# Patient Record
Sex: FEMALE | Race: WHITE | NOT HISPANIC OR LATINO | Employment: UNEMPLOYED | URBAN - METROPOLITAN AREA
[De-identification: names, ages, dates, MRNs, and addresses within clinical notes are randomized per-mention and may not be internally consistent; named-entity substitution may affect disease eponyms.]

---

## 2017-01-04 ENCOUNTER — OFFICE VISIT (OUTPATIENT)
Dept: PULMONOLOGY | Facility: CLINIC | Age: 47
End: 2017-01-04
Attending: INTERNAL MEDICINE
Payer: MEDICARE

## 2017-01-04 VITALS
DIASTOLIC BLOOD PRESSURE: 91 MMHG | OXYGEN SATURATION: 97 % | SYSTOLIC BLOOD PRESSURE: 140 MMHG | TEMPERATURE: 96.8 F | HEART RATE: 81 BPM | WEIGHT: 176.9 LBS | BODY MASS INDEX: 31.34 KG/M2

## 2017-01-04 DIAGNOSIS — R06.02 SHORTNESS OF BREATH: ICD-10-CM

## 2017-01-04 DIAGNOSIS — J44.9 SEVERE CHRONIC OBSTRUCTIVE PULMONARY DISEASE (H): Chronic | ICD-10-CM

## 2017-01-04 DIAGNOSIS — J45.909 UNCOMPLICATED ASTHMA, UNSPECIFIED ASTHMA SEVERITY: Primary | ICD-10-CM

## 2017-01-04 DIAGNOSIS — J43.8 OTHER EMPHYSEMA (H): ICD-10-CM

## 2017-01-04 PROCEDURE — 99212 OFFICE O/P EST SF 10 MIN: CPT | Mod: ZF

## 2017-01-04 RX ORDER — ALBUTEROL SULFATE 90 UG/1
2 AEROSOL, METERED RESPIRATORY (INHALATION) EVERY 6 HOURS
Qty: 3 INHALER | Refills: 3 | Status: ON HOLD | OUTPATIENT
Start: 2017-01-04 | End: 2018-02-09

## 2017-01-04 RX ORDER — BUDESONIDE AND FORMOTEROL FUMARATE DIHYDRATE 160; 4.5 UG/1; UG/1
2 AEROSOL RESPIRATORY (INHALATION) 2 TIMES DAILY
Qty: 3 INHALER | Refills: 3 | Status: SHIPPED | OUTPATIENT
Start: 2017-01-04 | End: 2017-11-21

## 2017-01-04 RX ORDER — NICOTINE POLACRILEX 2 MG/1
LOZENGE ORAL
Refills: 1 | COMMUNITY
Start: 2016-03-18 | End: 2018-04-25

## 2017-01-04 RX ORDER — LISINOPRIL 20 MG/1
TABLET ORAL
COMMUNITY
End: 2017-01-23

## 2017-01-04 RX ORDER — IPRATROPIUM BROMIDE AND ALBUTEROL SULFATE 2.5; .5 MG/3ML; MG/3ML
1 SOLUTION RESPIRATORY (INHALATION) EVERY 6 HOURS
Qty: 360 ML | Refills: 0 | Status: ON HOLD | OUTPATIENT
Start: 2017-01-04 | End: 2018-02-09

## 2017-01-04 RX ORDER — TIOTROPIUM BROMIDE 18 UG/1
CAPSULE ORAL; RESPIRATORY (INHALATION)
Qty: 30 CAPSULE | Refills: 1 | Status: ON HOLD | OUTPATIENT
Start: 2017-01-04 | End: 2018-02-09

## 2017-01-04 RX ORDER — FLUTICASONE PROPIONATE 50 MCG
2 SPRAY, SUSPENSION (ML) NASAL DAILY
Qty: 16 G | Refills: 11 | Status: SHIPPED | OUTPATIENT
Start: 2017-01-04 | End: 2017-03-27

## 2017-01-04 NOTE — Clinical Note
1/4/2017       RE: Swetha Patterson  [id number]  [facility] [place] [address]     Dear Colleague,    Thank you for referring your patient, Swetha Patterson, to the [facility] at [facility]. Please see a copy of my visit note below.    [facility]  Lung Nodule Clinic Note  January 4, 2017    Chief complaint:  Swetha Patterson is a 46 year old female seen in the Pulmonary Clinic  for   Chief Complaint   Patient presents with     Oncology Clinic Visit     lung nodules     Assessment and Plan:  1.  A 4 mm incidentally found new nodule in 06/2016 in the left lower lobe is not visible anymore on CT scan from 12/05/2016, which we discussed with the patient.  The patient has a history of smoking more than 30-pack years; therefore, could advise for annual CT scan of the chest for lung cancer screening purposes.  Initially continued by Dr. Williamson, who has been following the patient in the Pulmonary Clinic.   2.  Asthma.  The patient is status post bronchial thermoplasty with initial benefit, but response was not sustained.  She is back to her baseline, and requires prednisone treatment once or every other month.  She is back to smoking.  This probably contributes to her asthma symptoms significantly.  She has a history of GERD, and has been on a PPI.  Her last BT session was in 02/2015.   3.  Vocal cord nodules.  She has been seen by Dr. Mayen, and is scheduled to be seen by another ENT surgeon in 03/2017.   4.  Current smoker.      I spent more than 25 minutes face to face and greater than 50% of time was for counseling and coordination of care about pulm nodules.    History of Present Illness:  A 46-year-old woman with a history of asthma, status post bronchial thermoplasty.  She has a pulmonary nodule, and has been followed in our clinic.  She has sore throat, wheezing and shortness of breath.       Exposure history: Asbestos;  No , TB;  No , Radiation;   No     Allergies    Allergen Reactions     Codeine      vomiting     Hydrocodone Nausea and Vomiting     Sulfa Drugs      Nausea     Gabapentin Rash        Past Medical History   Diagnosis Date     Depressive disorder, not elsewhere classified      Anxiety state, unspecified      Esophageal reflux      Contact dermatitis and other eczema, due to unspecified cause      Asthma      Gastro-oesophageal reflux disease      Chronic pain      back pain from cyst     COPD (chronic obstructive pulmonary disease) (H)      Emphysema with chronic bronchitis (H)      Hyperlipidemia LDL goal <130      Family history of ischemic heart disease      HTN, goal below 140/90      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)      PONV (postoperative nausea and vomiting)      Arthritis      right hip arthritis        Past Surgical History   Procedure Laterality Date     C appendectomy  at age 17     Tonsillectomy  as a kid     Thoracoscopy  4/26/2013     Procedure: THORACOSCOPY;  LEFT VIDEO ASSISTED THORACOSCOPY, RESECTION OF POSTERIOR MEDIASTINAL MASS;  Surgeon: Av Peña MD;  Location: [facility]     Excise node mediastinal  4/26/2013     Procedure: EXCISE NODE MEDIASTINAL;;  Surgeon: Av Peña MD;  Location: [facility] OR     Bronchial thermoplasty N/A 11/14/2014     Procedure: BRONCHIAL THERMOPLASTY;  Surgeon: Ward Whitaker MD;  Location: [facility]     Bronchial thermoplasty N/A 12/19/2014     Procedure: BRONCHIAL THERMOPLASTY;  Surgeon: Ward Whitaker MD;  [facility]     Bronchial thermoplasty N/A 2/6/2015     Procedure: BRONCHIAL THERMOPLASTY;  Surgeon: Ward Whitaker MD;  [facility]: [facility]     Arthroscopy shoulder decompression Left 10/21/2015     Procedure: ARTHROSCOPY SHOULDER DECOMPRESSION;  Surgeon: Julien Milian MD;  Location:  OR        Social History     Social History     Marital Status: Single     Spouse Name: N/A     Number of Children: N/A     Years of Education: N/A     Occupational History      Not on file.     Social History Main Topics     Smoking status: Former Smoker -- 30 years     Quit date: 09/01/2015     Smokeless tobacco: Never Used      Comment: . started Chantix 8/9/15     Alcohol Use: No     Drug Use: No     Sexual Activity:     Partners: Male     Other Topics Concern     Caffeine Concern No     4-5 cans of pop daily     Special Diet No     Exercise No     on hold     Social History Narrative        Family History   Problem Relation Age of Onset     HEART DISEASE Mother      murmur, mi     Hypertension Mother      CEREBROVASCULAR DISEASE Mother      Depression Mother      Family History Negative Father      does not know  him     HEART DISEASE Sister      murmur, hole in heart     Family History Negative Brother      HEART DISEASE Maternal Grandfather      Gallbladder Disease Mother         Immunization History   Administered Date(s) Administered     Hepatitis B 07/09/1998, 09/18/2000     Influenza (IIV3) 01/09/2013, 10/27/2015     TDAP (ADACEL AGES 11-64) 06/12/2012       Current Outpatient Prescriptions   Medication Sig     lisinopril (PRINIVIL/ZESTRIL) 20 MG tablet      NICORETTE 2 MG lozenge PLACE 1 LOZENGE INSIDE CHEEK Q HOUR PRF SMOKING CESSATION UTD     oxyCODONE (ROXICODONE) 10 MG IR tablet 5 times daily as needed     diazepam (VALIUM) 10 MG tablet Take 1 tablet (10 mg) by mouth 2 times daily For sleep, anxiety, and muscle tension.     escitalopram (LEXAPRO) 10 MG tablet Take 1/2 tab daily for one week, then one tab daily. For anxiety and mood.     ketoconazole (NIZORAL) 2 % shampoo Apply to the affected area and wash off after 5 minutes.     desonide (DESOWEN) 0.05 % cream Apply sparingly to affected area three times daily as needed.     furosemide (LASIX) 20 MG tablet Take 1 tablet (20 mg) by mouth daily as needed     irbesartan (AVAPRO) 300 MG tablet Take 1 tablet (300 mg) by mouth daily     atorvastatin (LIPITOR) 80 MG tablet Take 1 tablet (80 mg) by mouth daily     predniSONE  (DELTASONE) 10 MG tablet Take is directed. Dr Low gave her a schedule     pantoprazole (PROTONIX) 20 MG tablet Take 2 tablets (40 mg) by mouth every morning (before breakfast)     prochlorperazine (COMPAZINE) 5 MG tablet Take 1 tablet (5 mg) by mouth every 6 hours as needed for nausea or vomiting     tiotropium (SPIRIVA HANDIHALER) 18 MCG inhalation capsule Inhale contents of one capsule daily.     fluticasone (FLONASE) 50 MCG/ACT nasal spray Spray 2 sprays into both nostrils daily     montelukast (SINGULAIR) 10 MG tablet Take 1 tablet (10 mg) by mouth At Bedtime     order for DME Equipment being ordered: Nebulizer machine  Length of need-lifetime     amLODIPine (NORVASC) 10 MG tablet Take 1 tablet (10 mg) by mouth daily     clindamycin-benzoyl peroxide (BENZACLIN) gel Apply topically 2 times daily     chlorhexidine (HIBICLENS) 4 % external liquid Wash ab, groin, thighs daily in shower DO NOT PUT ON FACE     budesonide-formoterol (SYMBICORT) 160-4.5 MCG/ACT inhaler Inhale 2 puffs into the lungs 2 times daily     albuterol (VENTOLIN HFA) 108 (90 BASE) MCG/ACT inhaler Inhale 2 puffs into the lungs every 6 hours     ranitidine (ZANTAC) 75 MG tablet Take 2 tablets (150 mg) by mouth 2 times daily     VITAMIN D, CHOLECALCIFEROL, PO Take 2,000 Units by mouth daily     cetirizine HCl 10 MG CAPS Take 1 capsule by mouth daily     ondansetron (ZOFRAN) 8 MG tablet Take 0.5-1 tablets (4-8 mg) by mouth every 8 hours as needed for nausea     ipratropium - albuterol 0.5 mg/2.5 mg/3 mL (DUONEB) 0.5-2.5 (3) MG/3ML nebulization Take 1 vial (3 mLs) by nebulization every 6 hours     No current facility-administered medications for this visit.        Review of Systems:  I have done 10 points of review systems and pertinent findings are wheezing ,otherwise negative.    Physical examination  Constitutional: Alert, oriented, not in distress  Vitals: B/P: 140/91, T: 96.8, P: 81, R: Data Unavailable  Eyes: No icterus, nystagmus, pupils  isocoric  Musculoskeletal: Normal muscle mass, no dephormity  Integumentary:  No rash, normal texture and turgor, no edema  Neurological: Alert, orientedx3, no motor deficits, cranial nerves grossly normal  Psychiatric:  Mood and affect are appropriate with insight into his/her medical condition    Data:  WBC     12.8   1/21/2016  RBC     5.24   1/21/2016  HGB     15.5   1/21/2016  HCT     46.8   1/21/2016  MCV       89   1/21/2016  MCH     29.6   1/21/2016  MCHC     33.1   1/21/2016  RDW     15.0   1/21/2016  PLT      364   1/21/2016    NA      144   1/21/2016   POTASSIUM      3.9   1/21/2016  CHLORIDE      107   1/21/2016  GENE      8.9   1/21/2016  CO2       29   1/21/2016  BUN       17   1/21/2016  CR     0.72   1/21/2016  GLC       97   1/21/2016    Thank you for allowing me participate in the care of Swetha Patterson.    RUBEN Whitaker MD

## 2017-01-04 NOTE — PROGRESS NOTES
[facility]  Lung Nodule Clinic Note  January 4, 2017    Chief complaint:  Swetha Patterson is a 46 year old female seen in the Pulmonary Clinic  for   Chief Complaint   Patient presents with     Oncology Clinic Visit     lung nodules     Assessment and Plan:  1.  A 4 mm incidentally found new nodule in 06/2016 in the left lower lobe is not visible anymore on CT scan from 12/05/2016, which we discussed with the patient.  The patient has a history of smoking more than 30-pack years; therefore, could advise for annual CT scan of the chest for lung cancer screening purposes.  Initially continued by Dr. Williamson, who has been following the patient in the Pulmonary Clinic.   2.  Asthma.  The patient is status post bronchial thermoplasty with initial benefit, but response was not sustained.  She is back to her baseline, and requires prednisone treatment once or every other month.  She is back to smoking.  This probably contributes to her asthma symptoms significantly.  She has a history of GERD, and has been on a PPI.  Her last BT session was in 02/2015.   3.  Vocal cord nodules.  She has been seen by Dr. Mayen, and is scheduled to be seen by another ENT surgeon in 03/2017.   4.  Current smoker.      I spent more than 25 minutes face to face and greater than 50% of time was for counseling and coordination of care about pulm nodules.    History of Present Illness:  A 46-year-old woman with a history of asthma, status post bronchial thermoplasty.  She has a pulmonary nodule, and has been followed in our clinic.  She has sore throat, wheezing and shortness of breath.       Exposure history: Asbestos;  No , TB;  No , Radiation;   No     Allergies   Allergen Reactions     Codeine      vomiting     Hydrocodone Nausea and Vomiting     Sulfa Drugs      Nausea     Gabapentin Rash        Past Medical History   Diagnosis Date     Depressive disorder, not elsewhere classified      Anxiety state, unspecified      Esophageal reflux       Contact dermatitis and other eczema, due to unspecified cause      Asthma      Gastro-oesophageal reflux disease      Chronic pain      back pain from cyst     COPD (chronic obstructive pulmonary disease) (H)      Emphysema with chronic bronchitis (H)      Hyperlipidemia LDL goal <130      Family history of ischemic heart disease      HTN, goal below 140/90      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)      PONV (postoperative nausea and vomiting)      Arthritis      right hip arthritis        Past Surgical History   Procedure Laterality Date     C appendectomy  at age 17     Tonsillectomy  as a kid     Thoracoscopy  4/26/2013     Procedure: THORACOSCOPY;  LEFT VIDEO ASSISTED THORACOSCOPY, RESECTION OF POSTERIOR MEDIASTINAL MASS;  Surgeon: Av Peña MD;  Location: [facility]     Excise node mediastinal  4/26/2013     Procedure: EXCISE NODE MEDIASTINAL;;  Surgeon: Av Peña MD;  Location: [facility]     Bronchial thermoplasty N/A 11/14/2014     Procedure: BRONCHIAL THERMOPLASTY;  Surgeon: Ward Whitaker MD;  [facility]     Bronchial thermoplasty N/A 12/19/2014     Procedure: BRONCHIAL THERMOPLASTY;  Surgeon: Ward Whitaker MD;  [facility]     Bronchial thermoplasty N/A 2/6/2015     Procedure: BRONCHIAL THERMOPLASTY;  Surgeon: Ward Whitaker MD;  [facility]: [facility]     Arthroscopy shoulder decompression Left 10/21/2015     Procedure: ARTHROSCOPY SHOULDER DECOMPRESSION;  Surgeon: Julien Milian MD;  Location: [facility]        Social History     Social History     Marital Status: Single     Spouse Name: N/A     Number of Children: N/A     Years of Education: N/A     Occupational History     Not on file.     Social History Main Topics     Smoking status: Former Smoker -- 30 years     Quit date: 09/01/2015     Smokeless tobacco: Never Used      Comment: . started Chantix 8/9/15     Alcohol Use: No     Drug Use: No     Sexual Activity:     Partners: Male     Other Topics  Concern     Caffeine Concern No     4-5 cans of pop daily     Special Diet No     Exercise No     on hold     Social History Narrative        Family History   Problem Relation Age of Onset     HEART DISEASE Mother      murmur, mi     Hypertension Mother      CEREBROVASCULAR DISEASE Mother      Depression Mother      Family History Negative Father      does not know  him     HEART DISEASE Sister      murmur, hole in heart     Family History Negative Brother      HEART DISEASE Maternal Grandfather      Gallbladder Disease Mother         Immunization History   Administered Date(s) Administered     Hepatitis B 07/09/1998, 09/18/2000     Influenza (IIV3) 01/09/2013, 10/27/2015     TDAP (ADACEL AGES 11-64) 06/12/2012       Current Outpatient Prescriptions   Medication Sig     lisinopril (PRINIVIL/ZESTRIL) 20 MG tablet      NICORETTE 2 MG lozenge PLACE 1 LOZENGE INSIDE CHEEK Q HOUR PRF SMOKING CESSATION UTD     oxyCODONE (ROXICODONE) 10 MG IR tablet 5 times daily as needed     diazepam (VALIUM) 10 MG tablet Take 1 tablet (10 mg) by mouth 2 times daily For sleep, anxiety, and muscle tension.     escitalopram (LEXAPRO) 10 MG tablet Take 1/2 tab daily for one week, then one tab daily. For anxiety and mood.     ketoconazole (NIZORAL) 2 % shampoo Apply to the affected area and wash off after 5 minutes.     desonide (DESOWEN) 0.05 % cream Apply sparingly to affected area three times daily as needed.     furosemide (LASIX) 20 MG tablet Take 1 tablet (20 mg) by mouth daily as needed     irbesartan (AVAPRO) 300 MG tablet Take 1 tablet (300 mg) by mouth daily     atorvastatin (LIPITOR) 80 MG tablet Take 1 tablet (80 mg) by mouth daily     predniSONE (DELTASONE) 10 MG tablet Take is directed. Dr Low gave her a schedule     pantoprazole (PROTONIX) 20 MG tablet Take 2 tablets (40 mg) by mouth every morning (before breakfast)     prochlorperazine (COMPAZINE) 5 MG tablet Take 1 tablet (5 mg) by mouth every 6 hours as needed for  nausea or vomiting     tiotropium (SPIRIVA HANDIHALER) 18 MCG inhalation capsule Inhale contents of one capsule daily.     fluticasone (FLONASE) 50 MCG/ACT nasal spray Spray 2 sprays into both nostrils daily     montelukast (SINGULAIR) 10 MG tablet Take 1 tablet (10 mg) by mouth At Bedtime     order for DME Equipment being ordered: Nebulizer machine  Length of need-lifetime     amLODIPine (NORVASC) 10 MG tablet Take 1 tablet (10 mg) by mouth daily     clindamycin-benzoyl peroxide (BENZACLIN) gel Apply topically 2 times daily     chlorhexidine (HIBICLENS) 4 % external liquid Wash ab, groin, thighs daily in shower DO NOT PUT ON FACE     budesonide-formoterol (SYMBICORT) 160-4.5 MCG/ACT inhaler Inhale 2 puffs into the lungs 2 times daily     albuterol (VENTOLIN HFA) 108 (90 BASE) MCG/ACT inhaler Inhale 2 puffs into the lungs every 6 hours     ranitidine (ZANTAC) 75 MG tablet Take 2 tablets (150 mg) by mouth 2 times daily     VITAMIN D, CHOLECALCIFEROL, PO Take 2,000 Units by mouth daily     cetirizine HCl 10 MG CAPS Take 1 capsule by mouth daily     ondansetron (ZOFRAN) 8 MG tablet Take 0.5-1 tablets (4-8 mg) by mouth every 8 hours as needed for nausea     ipratropium - albuterol 0.5 mg/2.5 mg/3 mL (DUONEB) 0.5-2.5 (3) MG/3ML nebulization Take 1 vial (3 mLs) by nebulization every 6 hours     No current facility-administered medications for this visit.        Review of Systems:  I have done 10 points of review systems and pertinent findings are wheezing ,otherwise negative.    Physical examination  Constitutional: Alert, oriented, not in distress  Vitals: B/P: 140/91, T: 96.8, P: 81, R: Data Unavailable  Eyes: No icterus, nystagmus, pupils isocoric  Musculoskeletal: Normal muscle mass, no dephormity  Integumentary:  No rash, normal texture and turgor, no edema  Neurological: Alert, orientedx3, no motor deficits, cranial nerves grossly normal  Psychiatric:  Mood and affect are appropriate with insight into his/her  medical condition    Data:  WBC     12.8   1/21/2016  RBC     5.24   1/21/2016  HGB     15.5   1/21/2016  HCT     46.8   1/21/2016  MCV       89   1/21/2016  MCH     29.6   1/21/2016  MCHC     33.1   1/21/2016  RDW     15.0   1/21/2016  PLT      364   1/21/2016    NA      144   1/21/2016   POTASSIUM      3.9   1/21/2016  CHLORIDE      107   1/21/2016  GENE      8.9   1/21/2016  CO2       29   1/21/2016  BUN       17   1/21/2016  CR     0.72   1/21/2016  GLC       97   1/21/2016    Thank you for allowing me participate in the care of Swetha MALVIN Patterson.    RUBEN Whitaker MD

## 2017-01-04 NOTE — NURSING NOTE
"Swetha Patterson is a 46 year old female who presents for:  Chief Complaint   Patient presents with     Oncology Clinic Visit     lung nodules        Initial Vitals:  /91 mmHg  Pulse 81  Temp(Src) 96.8  F (36  C) (Tympanic)  Wt 80.241 kg (176 lb 14.4 oz)  SpO2 97%  Breastfeeding? No Estimated body mass index is 31.34 kg/(m^2) as calculated from the following:    Height as of 12/19/16: 1.6 m (5' 3\").    Weight as of this encounter: 80.241 kg (176 lb 14.4 oz).. There is no height on file to calculate BSA. BP completed using cuff size: large  Data Unavailable No LMP recorded. Patient is not currently having periods (Reason: Premenopausal). Allergies and medications reviewed.     Medications: MEDICATION REFILLS NEEDED TODAY.  Pharmacy name entered into [facility]:    [facility] [address] [place] [address] [address] [place] [address] AT [facility] & [place], [place] - [address] [facility] [place], [place] - [address]  [address] [facility]    Comments: refills of symbacort, flonase, duoneb,ventolin, spiriva. Patient would also like to discuss possibly starting a different inhaler    7 minutes for nursing intake (face to face time)   Aretha Yang CMA          "

## 2017-01-23 ENCOUNTER — OFFICE VISIT (OUTPATIENT)
Dept: INTERNAL MEDICINE | Facility: CLINIC | Age: 47
End: 2017-01-23
Payer: MEDICARE

## 2017-01-23 VITALS
BODY MASS INDEX: 31.24 KG/M2 | WEIGHT: 176.3 LBS | SYSTOLIC BLOOD PRESSURE: 130 MMHG | OXYGEN SATURATION: 99 % | HEART RATE: 89 BPM | TEMPERATURE: 98.1 F | HEIGHT: 63 IN | DIASTOLIC BLOOD PRESSURE: 80 MMHG

## 2017-01-23 DIAGNOSIS — L73.2 HYDRADENITIS: ICD-10-CM

## 2017-01-23 DIAGNOSIS — F41.9 ANXIETY: ICD-10-CM

## 2017-01-23 DIAGNOSIS — E78.5 HYPERLIPIDEMIA LDL GOAL <130: ICD-10-CM

## 2017-01-23 DIAGNOSIS — M79.10 MUSCLE PAIN: ICD-10-CM

## 2017-01-23 DIAGNOSIS — R60.0 BILATERAL LEG EDEMA: ICD-10-CM

## 2017-01-23 DIAGNOSIS — B37.31 CANDIDIASIS OF VULVA AND VAGINA: ICD-10-CM

## 2017-01-23 DIAGNOSIS — R07.89 CHEST WALL PAIN: ICD-10-CM

## 2017-01-23 DIAGNOSIS — F11.20 CONTINUOUS OPIOID DEPENDENCE (H): Primary | Chronic | ICD-10-CM

## 2017-01-23 DIAGNOSIS — Z82.49 FAMILY HISTORY OF ISCHEMIC HEART DISEASE: ICD-10-CM

## 2017-01-23 DIAGNOSIS — J44.9 SEVERE CHRONIC OBSTRUCTIVE PULMONARY DISEASE (H): Chronic | ICD-10-CM

## 2017-01-23 DIAGNOSIS — I10 ESSENTIAL HYPERTENSION WITH GOAL BLOOD PRESSURE LESS THAN 140/90: Chronic | ICD-10-CM

## 2017-01-23 PROCEDURE — 99214 OFFICE O/P EST MOD 30 MIN: CPT | Performed by: INTERNAL MEDICINE

## 2017-01-23 RX ORDER — FUROSEMIDE 20 MG
20 TABLET ORAL DAILY PRN
Qty: 90 TABLET | Refills: 1 | Status: SHIPPED | OUTPATIENT
Start: 2017-01-23 | End: 2017-11-21

## 2017-01-23 RX ORDER — FLUCONAZOLE 150 MG/1
150 TABLET ORAL
Qty: 4 TABLET | Refills: 0 | Status: SHIPPED | OUTPATIENT
Start: 2017-01-23 | End: 2017-03-02

## 2017-01-23 RX ORDER — DIAZEPAM 10 MG
10 TABLET ORAL EVERY 8 HOURS PRN
Qty: 126 TABLET | Refills: 0 | Status: SHIPPED | OUTPATIENT
Start: 2017-01-23 | End: 2017-03-02

## 2017-01-23 RX ORDER — ATORVASTATIN CALCIUM 80 MG/1
80 TABLET, FILM COATED ORAL DAILY
Qty: 90 TABLET | Refills: 1 | Status: SHIPPED | OUTPATIENT
Start: 2017-01-23 | End: 2017-03-27

## 2017-01-23 RX ORDER — OXYCODONE HYDROCHLORIDE 10 MG/1
TABLET ORAL
Qty: 210 TABLET | Refills: 0 | Status: SHIPPED | OUTPATIENT
Start: 2017-01-23 | End: 2017-03-02

## 2017-01-23 RX ORDER — LISINOPRIL 20 MG/1
20 TABLET ORAL DAILY
Qty: 90 TABLET | Refills: 1 | Status: SHIPPED | OUTPATIENT
Start: 2017-01-23 | End: 2017-03-27

## 2017-01-23 NOTE — PROGRESS NOTES
Dr Low's note            ASSESSMENT & PLAN:                                                      (F11.20) Continuous opioid dependence ([facility]) opioid for chr cough and chest pain ^^^^  (primary encounter diagnosis)  Comment:   Plan: oxyCODONE (ROXICODONE) 10 MG IR tablet            (J44.9) Chr Lung Disease - managed by the pulm dept ([facility])^^^  Comment:   Plan: oxyCODONE (ROXICODONE) 10 MG IR tablet            (L73.2) Hydradenitis  Comment:   Plan: no time to look for a surgeon for I&D sunce she lives tomorrow for [place]. I advised her to see UC or ER for possible I&D    (B37.3) Candidiasis of vulva and vagina  Comment:   Plan: fluconazole (DIFLUCAN) 150 MG tablet            (Z82.49) Family history of ischemic heart disease  Comment:   Plan: atorvastatin (LIPITOR) 80 MG tablet            (E78.5) Hyperlipidemia LDL goal <130  Comment:   Plan: atorvastatin (LIPITOR) 80 MG tablet            (R60.0) Bilateral leg edema  Comment:   Plan: furosemide (LASIX) 20 MG tablet            (I10) Essential hypertension with goal blood pressure less than 140/90  Comment:   Plan: lisinopril (PRINIVIL/ZESTRIL) 20 MG tablet            (R07.89) Chest wall pain  Comment:   Plan: oxyCODONE (ROXICODONE) 10 MG IR tablet,         diazepam (VALIUM) 10 MG tablet            (M79.1) Muscle pain  Comment:   Plan: diazepam (VALIUM) 10 MG tablet            (F41.9) Anxiety  Comment:   Plan: diazepam (VALIUM) 10 MG tablet               Chief complaint:                                                      Follow up chronic medical problems    abd skin     SUBJECTIVE:                                                    Swetha Patterson is a 46 year old female who presents to clinic today for the following health issues:        *RECHECK-Follow up LOV 12/19/16. She has been spending a lot of time in [place], will be leaving again tomorrow morning to go back there.   *Derm Problem-Spots on her lower abdominal area are flaring up. She is on doxycyline for this,  "but this is giving her yeast infection. Would like Rx for Fluconazole.   *Imm/Inj-Ok to do flu shot today?         Review of Systems:                                                      ROS: negative for fever, chills, wheezes, chest pain, shortness of breath, vomiting, abdominal pain, leg swelling Pos for chr cough     A 10-point review of systems was obtained.  Those pertinent are above and in the in the Subjective section.  The rest of the systems are negative.           OBJECTIVE:             Physical exam:  Blood pressure 130/80, pulse 89, temperature 98.1  F (36.7  C), temperature source Oral, height 5' 3\" (1.6 m), weight 176 lb 4.8 oz (79.969 kg), SpO2 99 %, not currently breastfeeding.   NAD, appears comfortable  Skin: she has chronic hydradenitis on the abd wall. Today I think she has a cyst that got inflamed. Swollen about 4x 5 cm with redness around.   Chest: clear to auscultation bilaterally, good respiratory effort  Heart: S1 S2, RRR, no mgr appreciated  Abdomen: soft, not tender,   Extremities: no edema,   Neurologic: A, Ox3, no focal signs appreciated    PMHx: reviewed  Past Medical History   Diagnosis Date     Depressive disorder, not elsewhere classified      Anxiety state, unspecified      Esophageal reflux      Contact dermatitis and other eczema, due to unspecified cause      Asthma      Gastro-oesophageal reflux disease      Chronic pain      back pain from cyst     COPD (chronic obstructive pulmonary disease) (H)      Emphysema with chronic bronchitis (H)      Hyperlipidemia LDL goal <130      Family history of ischemic heart disease      HTN, goal below 140/90      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)      PONV (postoperative nausea and vomiting)      Arthritis      right hip arthritis      PSHx: reviewed  Past Surgical History   Procedure Laterality Date     C appendectomy  at age 17     Tonsillectomy  as a kid     Thoracoscopy  4/26/2013     Procedure: THORACOSCOPY;  LEFT VIDEO " ASSISTED THORACOSCOPY, RESECTION OF POSTERIOR MEDIASTINAL MASS;  Surgeon: Av Peña MD;  Location: [facility] OR     Excise node mediastinal  4/26/2013     Procedure: EXCISE NODE MEDIASTINAL;;  Surgeon: Av Peña MD;  Location: [facility] OR     Bronchial thermoplasty N/A 11/14/2014     Procedure: BRONCHIAL THERMOPLASTY;  Surgeon: Ward Whitaker MD;  Location: [facility]     Bronchial thermoplasty N/A 12/19/2014     Procedure: BRONCHIAL THERMOPLASTY;  Surgeon: Ward Whitaker MD;  Location: [facility]     Bronchial thermoplasty N/A 2/6/2015     Procedure: BRONCHIAL THERMOPLASTY;  Surgeon: Ward Whitaker MD;  Location: [facility]     Arthroscopy shoulder decompression Left 10/21/2015     Procedure: ARTHROSCOPY SHOULDER DECOMPRESSION;  Surgeon: Julien Milian MD;  Location: [facility] OR        Meds: reviewed  Current Outpatient Prescriptions   Medication Sig Dispense Refill     fluconazole (DIFLUCAN) 150 MG tablet Take 1 tablet (150 mg) by mouth every 3 days 4 tablet 0     atorvastatin (LIPITOR) 80 MG tablet Take 1 tablet (80 mg) by mouth daily 90 tablet 1     furosemide (LASIX) 20 MG tablet Take 1 tablet (20 mg) by mouth daily as needed 90 tablet 1     lisinopril (PRINIVIL/ZESTRIL) 20 MG tablet Take 1 tablet (20 mg) by mouth daily 90 tablet 1     oxyCODONE (ROXICODONE) 10 MG IR tablet 5 times daily as needed 210 tablet 0     diazepam (VALIUM) 10 MG tablet Take 1 tablet (10 mg) by mouth every 8 hours as needed for anxiety or sleep For sleep, anxiety, and muscle tension. 126 tablet 0     NICORETTE 2 MG lozenge PLACE 1 LOZENGE INSIDE CHEEK Q HOUR PRF SMOKING CESSATION UTD  1     budesonide-formoterol (SYMBICORT) 160-4.5 MCG/ACT Inhaler Inhale 2 puffs into the lungs 2 times daily 3 Inhaler 3     fluticasone (FLONASE) 50 MCG/ACT spray Spray 2 sprays into both nostrils daily 16 g 11     ipratropium - albuterol 0.5 mg/2.5 mg/3 mL (DUONEB) 0.5-2.5 (3) MG/3ML neb solution Take 1 vial (3 mLs) by nebulization  every 6 hours 360 mL 0     tiotropium (SPIRIVA HANDIHALER) 18 MCG capsule Inhale contents of one capsule daily. 30 capsule 1     albuterol (VENTOLIN HFA) 108 (90 BASE) MCG/ACT Inhaler Inhale 2 puffs into the lungs every 6 hours 3 Inhaler 3     escitalopram (LEXAPRO) 10 MG tablet Take 1/2 tab daily for one week, then one tab daily. For anxiety and mood. 30 tablet 1     ketoconazole (NIZORAL) 2 % shampoo Apply to the affected area and wash off after 5 minutes. 120 mL 1     desonide (DESOWEN) 0.05 % cream Apply sparingly to affected area three times daily as needed. 60 g 0     irbesartan (AVAPRO) 300 MG tablet Take 1 tablet (300 mg) by mouth daily 90 tablet 1     predniSONE (DELTASONE) 10 MG tablet Take is directed. Dr Low gave her a schedule 60 tablet 0     pantoprazole (PROTONIX) 20 MG tablet Take 2 tablets (40 mg) by mouth every morning (before breakfast) 90 tablet 3     prochlorperazine (COMPAZINE) 5 MG tablet Take 1 tablet (5 mg) by mouth every 6 hours as needed for nausea or vomiting 90 tablet 0     montelukast (SINGULAIR) 10 MG tablet Take 1 tablet (10 mg) by mouth At Bedtime 30 tablet 11     order for DME Equipment being ordered: Nebulizer machine  Length of need-lifetime 1 Device 0     amLODIPine (NORVASC) 10 MG tablet Take 1 tablet (10 mg) by mouth daily 90 tablet 1     clindamycin-benzoyl peroxide (BENZACLIN) gel Apply topically 2 times daily 100 g 3     chlorhexidine (HIBICLENS) 4 % external liquid Wash ab, groin, thighs daily in shower DO NOT PUT ON FACE 946 mL 3     ranitidine (ZANTAC) 75 MG tablet Take 2 tablets (150 mg) by mouth 2 times daily 60 tablet 3     VITAMIN D, CHOLECALCIFEROL, PO Take 2,000 Units by mouth daily       cetirizine HCl 10 MG CAPS Take 1 capsule by mouth daily 90 capsule 3     ondansetron (ZOFRAN) 8 MG tablet Take 0.5-1 tablets (4-8 mg) by mouth every 8 hours as needed for nausea 30 tablet 1       Soc Hx: reviewed  Fam Hx: reviewed          Roro Low MD  Internal  Medicine

## 2017-01-23 NOTE — NURSING NOTE
"Chief Complaint   Patient presents with     RECHECK     Derm Problem     Imm/Inj       Initial /80 mmHg  Pulse 89  Temp(Src) 98.1  F (36.7  C) (Oral)  Ht 5' 3\" (1.6 m)  Wt 176 lb 4.8 oz (79.969 kg)  BMI 31.24 kg/m2  SpO2 99%  Breastfeeding? No Estimated body mass index is 31.24 kg/(m^2) as calculated from the following:    Height as of this encounter: 5' 3\" (1.6 m).    Weight as of this encounter: 176 lb 4.8 oz (79.969 kg).  BP completed using cuff size: hilda Villarreal CMA      "

## 2017-01-24 ASSESSMENT — ASTHMA QUESTIONNAIRES: ACT_TOTALSCORE: 10

## 2017-01-26 ENCOUNTER — TELEPHONE (OUTPATIENT)
Dept: INTERNAL MEDICINE | Facility: CLINIC | Age: 47
End: 2017-01-26

## 2017-01-26 NOTE — TELEPHONE ENCOUNTER
Called pt regarding form she left here for special diet with [facility]. Dr. Low signed it, but there is a spot on the form that Swetha needed to sign okaying us to release this information to [facility]. She is in [place] right now until the beginning of March, advised her we will hang on to it until she comes back and can sign it. She was ok with this plan. I will keep this form at my desk for now.    She was also asking about a derm issue she has been dealing with on her abdominal area. She has a few red painful swollen lumps that have increased in size and diameter (she says they're sticking out of her skin by about 3 inches now and the area of redness have grown). She also reports that she is generally not feeling well and has been getting hot and cold flashes (she wasn't sure if she has a fever). Per Dr. Low, she needs to go to an urgent care or an ED there in [place], may need to have those areas lanced. Pt understood with plan and agrees to go to ED today.

## 2017-02-14 DIAGNOSIS — I10 ESSENTIAL HYPERTENSION WITH GOAL BLOOD PRESSURE LESS THAN 140/90: Chronic | ICD-10-CM

## 2017-02-14 DIAGNOSIS — J30.89 DUST ALLERGY: ICD-10-CM

## 2017-02-14 DIAGNOSIS — J44.9 SEVERE CHRONIC OBSTRUCTIVE PULMONARY DISEASE (H): Chronic | ICD-10-CM

## 2017-02-14 NOTE — TELEPHONE ENCOUNTER
Called pt backand informed of message from Pharmacist. To contact her insurance company restricted program and inform of PCP KEYLA. Instructed her to list Dr. Leeann Chandra as the covering provider d/t leave of absence. Pt agrees.   Pt states she also needs a refill of her Irbesartan 300 mg tablets and Singulair 10 mg tablets.     Pt will call back with information from her insurance company.

## 2017-02-14 NOTE — TELEPHONE ENCOUNTER
Patient calling in stating she will be in March 2nd and flying out to [place] March 6th and wont be back until May 15th, 2017. Concerned about getting her prescriptions refilled d/t restrictions. States she will be needing a refill of Oxycodone and Valium, and a  heart medication that she could not remember. The phone call was disconnected at this time.    Called [facility] to discuss PCP Leave of Absence. Jacquelin, pharmacist, states that pt will need to contact her insurance restricted program worker. Pharmacist states her script currently should last her through February 6th 2017.

## 2017-02-14 NOTE — TELEPHONE ENCOUNTER
Insurance contacted by Pt and they and told the pt that she is no longer on the restrictive program and she can see any one.    Her and NNEKA De Guzman discussed seeing Dr. Chandra. So refused to work with me and asked for Gege to call her back.    Sorry, passed to NNEKA De Guzman RN

## 2017-02-15 RX ORDER — MONTELUKAST SODIUM 10 MG/1
10 TABLET ORAL AT BEDTIME
Qty: 30 TABLET | Refills: 1 | Status: ON HOLD | OUTPATIENT
Start: 2017-02-15 | End: 2018-02-09

## 2017-02-15 RX ORDER — IRBESARTAN 300 MG/1
300 TABLET ORAL DAILY
Qty: 90 TABLET | Refills: 1 | Status: SHIPPED | OUTPATIENT
Start: 2017-02-15 | End: 2017-10-16

## 2017-02-15 NOTE — TELEPHONE ENCOUNTER
Pt is no longer on the restricted program according to her insurance company. Scheduled appt to establish care with Dr. Chandra 3/2/17 at 0900.      Irbesartan      Last Written Prescription Date: 11/1/16  Last Fill Quantity: 90, # refills: 1  Last Office Visit with [facility], [facility] or [facility] prescribing provider: 1/23/17 Crintea  Next 5 appointments (look out 90 days)     Mar 02, 2017  9:00 AM CST   [facility]    [address]  [facility] [address]   [phone]                   Potassium   Date Value Ref Range Status   01/21/2016 3.9 3.4 - 5.3 mmol/L Final     Creatinine   Date Value Ref Range Status   01/21/2016 0.72 0.52 - 1.04 mg/dL Final     BP Readings from Last 3 Encounters:   01/23/17 130/80   01/04/17 (!) 140/91   12/19/16 114/74       Prescription approved per [facility] Refill Protocol.      Montelukast       Last Written Prescription Date: 4/8/16  Last Fill Quantity: 30, # refills: 11    Last Office Visit with [facility], [facility] or [facility] prescribing provider:  1/23/17 Crintea   Future Office Visit:    Next 5 appointments (look out 90 days)     Mar 02, 2017  9:00 AM CST   [facility]    [address]  [facility] [address]   [phone]                   Date of Last Asthma Action Plan Letter:   Asthma Action Plan Q1 Year    Topic Date Due     Asthma Action Plan - yearly  10/04/2017      Asthma Control Test:   ACT Total Scores 1/23/2017   ACT TOTAL SCORE (Goal Greater than or Equal to 20) 10   In the past 12 months, how many times did you visit the emergency room for your asthma without being admitted to the hospital? 0   In the past 12 months, how many times were you hospitalized overnight because of your asthma? 0       Date of Last Spirometry Test:   No results found for this or any previous visit.    Routing refill request to provider  for review/approval because:  Patient needs to be seen because:  No spirometry test completed.

## 2017-03-02 ENCOUNTER — OFFICE VISIT (OUTPATIENT)
Dept: INTERNAL MEDICINE | Facility: CLINIC | Age: 47
End: 2017-03-02
Payer: MEDICARE

## 2017-03-02 VITALS
HEART RATE: 82 BPM | DIASTOLIC BLOOD PRESSURE: 74 MMHG | SYSTOLIC BLOOD PRESSURE: 110 MMHG | WEIGHT: 173.9 LBS | OXYGEN SATURATION: 97 % | HEIGHT: 63 IN | BODY MASS INDEX: 30.81 KG/M2 | TEMPERATURE: 98.1 F

## 2017-03-02 DIAGNOSIS — R07.89 CHEST WALL PAIN: ICD-10-CM

## 2017-03-02 DIAGNOSIS — M79.10 MUSCLE PAIN: ICD-10-CM

## 2017-03-02 DIAGNOSIS — T38.0X5A IMMUNODEFICIENCY SECONDARY TO STEROIDS (H): Chronic | ICD-10-CM

## 2017-03-02 DIAGNOSIS — I10 ESSENTIAL HYPERTENSION WITH GOAL BLOOD PRESSURE LESS THAN 140/90: Chronic | ICD-10-CM

## 2017-03-02 DIAGNOSIS — F11.20 CONTINUOUS OPIOID DEPENDENCE (H): Chronic | ICD-10-CM

## 2017-03-02 DIAGNOSIS — F41.9 ANXIETY: ICD-10-CM

## 2017-03-02 DIAGNOSIS — K75.81 NASH (NONALCOHOLIC STEATOHEPATITIS): Chronic | ICD-10-CM

## 2017-03-02 DIAGNOSIS — G89.29 CHRONIC PAIN WITH DRUG DEPENDENCE (H): Chronic | ICD-10-CM

## 2017-03-02 DIAGNOSIS — B37.31 CANDIDIASIS OF VULVA AND VAGINA: ICD-10-CM

## 2017-03-02 DIAGNOSIS — D84.821 IMMUNODEFICIENCY SECONDARY TO STEROIDS (H): Chronic | ICD-10-CM

## 2017-03-02 DIAGNOSIS — F19.20 CHRONIC PAIN WITH DRUG DEPENDENCE (H): Chronic | ICD-10-CM

## 2017-03-02 DIAGNOSIS — Z79.52 IMMUNODEFICIENCY SECONDARY TO STEROIDS (H): Chronic | ICD-10-CM

## 2017-03-02 DIAGNOSIS — J44.9 SEVERE CHRONIC OBSTRUCTIVE PULMONARY DISEASE (H): Primary | Chronic | ICD-10-CM

## 2017-03-02 PROCEDURE — 99214 OFFICE O/P EST MOD 30 MIN: CPT | Performed by: INTERNAL MEDICINE

## 2017-03-02 RX ORDER — DIAZEPAM 10 MG
10 TABLET ORAL EVERY 8 HOURS PRN
Qty: 126 TABLET | Refills: 0 | Status: SHIPPED | OUTPATIENT
Start: 2017-03-02 | End: 2017-03-27

## 2017-03-02 RX ORDER — OXYCODONE HYDROCHLORIDE 10 MG/1
TABLET ORAL
Qty: 210 TABLET | Refills: 0 | Status: SHIPPED | OUTPATIENT
Start: 2017-03-02 | End: 2017-03-27

## 2017-03-02 RX ORDER — FLUCONAZOLE 150 MG/1
150 TABLET ORAL
Qty: 4 TABLET | Refills: 0 | Status: SHIPPED | OUTPATIENT
Start: 2017-03-02 | End: 2017-03-27

## 2017-03-02 NOTE — NURSING NOTE
"Chief Complaint   Patient presents with     RECHECK     Not establishing care, just seeing Dr. Chandra while Dr. Low is out. PT came back from [place] today, will be leaving again this Tuesday 3/7/17 and will be in [place] until 4/23/17. Will be meeting with throat surgeon tomorrow morning.      Recheck Medication     Review and refill     Medication Request     She has doxycycline on hand to use for her ongoing skin infection, but she needs Rx for Diflucan because she gets yeast infection every time.        Initial /74 (BP Location: Right arm, Patient Position: Chair, Cuff Size: Adult Large)  Pulse 82  Temp 98.1  F (36.7  C) (Oral)  Ht 5' 3\" (1.6 m)  Wt 173 lb 14.4 oz (78.9 kg)  SpO2 97%  Breastfeeding? No  BMI 30.81 kg/m2 Estimated body mass index is 30.81 kg/(m^2) as calculated from the following:    Height as of this encounter: 5' 3\" (1.6 m).    Weight as of this encounter: 173 lb 14.4 oz (78.9 kg).  Medication Reconciliation: complete   Carolyn Villarreal CMA      "

## 2017-03-02 NOTE — MR AVS SNAPSHOT
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)        Chest wall pain        Continuous opioid dependence ([facility]) opioid for chr cough and chest pain ^^^^        Muscle pain        Anxiety        Candidiasis of vulva and vagina           Follow-ups after your visit        Your next 10 appointments already scheduled     Mar 03, 2017  8:40 AM CST   (Arrive by 8:10 AM)   NEW THROAT with Lis Talbert MD   [facility] ([facility])    [address] [place] [address] [phone]           - This is a multidisciplinary care team visit with an ENT physician and may include a speech language pathologist. - It is important to know that if you are evaluated and/or treated by both a physician and a speech pathologist during your visit, your billing will reflect the input that you receive from both providers as separate professionals. Although most insurance plans do cover these services, we encourage you to contact your insurance company prior to your visit to determine whether there are any coverage limitations that might affect you financially. - Billing/procedure codes that are frequently associated with visits to our clinic include (but are not limited to) the ones listed below. Most patients will not need all of these items, but it may be useful to ask your insurance company's patient . 50710:  "Flexible fiberoptic laryngoscopy, 41001: Laryngoscopy; flexible or rigid fiberoptic, with stroboscopy, 33968: Flexible endoscopic evaluation of swallowing, 35330: Speech pathology evaluation, 44703: Speech pathology treatment for voice, speech, communication, 02730: Speech pathology treatment for swallowing/oral function for feeding - If you have any concerns or questions, or if you would prefer not to have a speech pathologist involved in your visit, please contact our Clinic Coordinator at [phone], at least 24 hours prior to your appointment.            Apr 21, 2017  9:20 AM CDT   SHORT with Roro [facility]la MD   [facility]    [address]  [facility] [address] [phone]              Who to contact     If you have questions or need follow up information about today's clinic visit or your schedule please contact [facility] directly at [phone].  Normal or non-critical lab and imaging results will be communicated to you by [employer], letter or phone within 4 business days after the clinic has received the results. If you do not hear from us within 7 days, please contact the clinic through [employer] or phone. If you have a critical or abnormal lab result, we will notify you by phone as soon as possible.  Submit refill requests through [employer] or call your pharmacy and they will forward the refill request to us. Please allow 3 business days for your refill to be completed.          Additional Information About Your Visit        [employer] Information     [employer] lets you send messages to your doctor, view your test results, renew your prescriptions, schedule appointments and more. To sign up, go to [email or web address] . Click on \"Log in\" on the left side of the screen, which will take you to the Welcome page. Then click on \"Sign up Now\" on the right side of the page.     You will be asked to enter the access code " "listed below, as well as some personal information. Please follow the directions to create your username and password.     Your access code is: [email or web address]  Expires: 3/26/2017  6:30 AM     Your access code will  in 90 days. If you need help or a new code, please call your [facility] or [phone].        Care EveryWhere ID     This is your Care EveryWhere ID. This could be used by other organizations to access your [facility] records  [id number]        Your Vitals Were     Pulse Temperature Height Pulse Oximetry Breastfeeding? BMI (Body Mass Index)    82 98.1  F (36.7  C) (Oral) 5' 3\" (1.6 m) 97% No 30.81 kg/m2       Blood Pressure from Last 3 Encounters:   17 110/74   17 130/80   17 (!) 140/91    Weight from Last 3 Encounters:   17 173 lb 14.4 oz (78.9 kg)   17 176 lb 4.8 oz (80 kg)   17 176 lb 14.4 oz (80.2 kg)              We Performed the Following     DEPRESSION ACTION PLAN (DAP)          Where to get your medicines      These medications were sent to [facility] [place], [place] - [facility].  [facility] [address] [address]     Phone:  [phone]     fluconazole 150 MG tablet         Some of these will need a paper prescription and others can be bought over the counter.  Ask your nurse if you have questions.     Bring a paper prescription for each of these medications     diazepam 10 MG tablet    oxyCODONE 10 MG IR tablet          Primary Care Provider Office Phone # Fax #    Roro Chawla [phone]       [facility] [address] [place] [address]        Thank you!     Thank you for choosing [facility]  for your care. Our goal is always to provide you with excellent care. Hearing back from our patients is one way we can continue to improve our services. Please take a few minutes to complete the written survey that you may receive in the mail " after your visit with us. Thank you!             Your Updated Medication List - Protect others around you: Learn how to safely use, store and throw away your medicines at www.disposemymeds.org.          This list is accurate as of: 3/2/17  9:32 AM.  Always use your most recent med list.                   Brand Name Dispense Instructions for use    albuterol 108 (90 BASE) MCG/ACT Inhaler    VENTOLIN HFA    3 Inhaler    Inhale 2 puffs into the lungs every 6 hours       amLODIPine 10 MG tablet    NORVASC    90 tablet    Take 1 tablet (10 mg) by mouth daily       atorvastatin 80 MG tablet    LIPITOR    90 tablet    Take 1 tablet (80 mg) by mouth daily       budesonide-formoterol 160-4.5 MCG/ACT Inhaler    SYMBICORT    3 Inhaler    Inhale 2 puffs into the lungs 2 times daily       cetirizine HCl 10 MG Caps     90 capsule    Take 1 capsule by mouth daily       chlorhexidine 4 % liquid    HIBICLENS    946 mL    Wash ab, groin, thighs daily in shower DO NOT PUT ON FACE       clindamycin-benzoyl peroxide gel    BENZACLIN    100 g    Apply topically 2 times daily       desonide 0.05 % cream    DESOWEN    60 g    Apply sparingly to affected area three times daily as needed.       diazepam 10 MG tablet    VALIUM    126 tablet    Take 1 tablet (10 mg) by mouth every 8 hours as needed for anxiety or sleep For sleep, anxiety, and muscle tension.       escitalopram 10 MG tablet    LEXAPRO    30 tablet    Take 1/2 tab daily for one week, then one tab daily. For anxiety and mood.       fluconazole 150 MG tablet    DIFLUCAN    4 tablet    Take 1 tablet (150 mg) by mouth every 3 days       fluticasone 50 MCG/ACT spray    FLONASE    16 g    Spray 2 sprays into both nostrils daily       furosemide 20 MG tablet    LASIX    90 tablet    Take 1 tablet (20 mg) by mouth daily as needed       ipratropium - albuterol 0.5 mg/2.5 mg/3 mL 0.5-2.5 (3) MG/3ML neb solution    DUONEB    360 mL    Take 1 vial (3 mLs) by nebulization every 6 hours        irbesartan 300 MG tablet    AVAPRO    90 tablet    Take 1 tablet (300 mg) by mouth daily       ketoconazole 2 % shampoo    NIZORAL    120 mL    Apply to the affected area and wash off after 5 minutes.       lisinopril 20 MG tablet    PRINIVIL/ZESTRIL    90 tablet    Take 1 tablet (20 mg) by mouth daily       montelukast 10 MG tablet    SINGULAIR    30 tablet    Take 1 tablet (10 mg) by mouth At Bedtime       NICORETTE 2 MG lozenge   Generic drug:  nicotine polacrilex      PLACE 1 LOZENGE INSIDE CHEEK Q HOUR PRF SMOKING CESSATION UTD       ondansetron 8 MG tablet    ZOFRAN    30 tablet    Take 0.5-1 tablets (4-8 mg) by mouth every 8 hours as needed for nausea       order for DME     1 Device    Equipment being ordered: Nebulizer machine Length of need-lifetime       oxyCODONE 10 MG IR tablet    ROXICODONE    210 tablet    5 times daily as needed       pantoprazole 20 MG EC tablet    PROTONIX    90 tablet    Take 2 tablets (40 mg) by mouth every morning (before breakfast)       predniSONE 10 MG tablet    DELTASONE    60 tablet    Take is directed. Dr Low gave her a schedule       prochlorperazine 5 MG tablet    COMPAZINE    90 tablet    Take 1 tablet (5 mg) by mouth every 6 hours as needed for nausea or vomiting       ranitidine 75 MG tablet    ZANTAC    60 tablet    Take 2 tablets (150 mg) by mouth 2 times daily       tiotropium 18 MCG capsule    SPIRIVA HANDIHALER    30 capsule    Inhale contents of one capsule daily.       VITAMIN D (CHOLECALCIFEROL) PO      Take 2,000 Units by mouth daily

## 2017-03-02 NOTE — PROGRESS NOTES
SUBJECTIVE:                                                    Swetha Patterson is a 46 year old female who presents to clinic today for the following health issues:      HPI:   Swetha Patterson a 46 year old female here for follow-up of chronic pain, chronic lung disease, and hypertension.     She has been spending a lot of time in [place] and her lungs do much better down there. She is looking into moving there. Denies any fever chills or night sweats. Chr has chronic cough, worse since she has been back in [place]. No increased dyspnea on exertion. No orthopnea or PND.     She will be seen for labs at the [facility] next week. So does not want labs today.     She has no side effects on her medications or problems taking them.       Problem list and histories reviewed & adjusted, as indicated.  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)     Chr PAIN - Ctr SUBSTANCE AGREEMENT - SIGNED     Chr Lung Disease - managed by the pulm dept ([facility])^^^     Continuous opioid dependence ([facility]) opioid for chr cough and chest pain ^^^^     DIAZ (nonalcoholic steatohepatitis)     Essential hypertension with goal blood pressure less than 140/90     Past Surgical History   Procedure Laterality Date     C appendectomy  at age 17     Tonsillectomy  as a kid     Thoracoscopy  4/26/2013     Procedure: THORACOSCOPY;  LEFT VIDEO ASSISTED THORACOSCOPY, RESECTION OF POSTERIOR MEDIASTINAL MASS;  Surgeon: Av Peña MD;  Location:  OR     Excise node mediastinal  4/26/2013     Procedure: EXCISE NODE MEDIASTINAL;;  Surgeon: Av Peña MD;  Location:  OR     Bronchial thermoplasty N/A 11/14/2014     Procedure: BRONCHIAL THERMOPLASTY;  " Surgeon: Ward Whitkaer MD;  Location: [facility]     Bronchial thermoplasty N/A 12/19/2014     Procedure: BRONCHIAL THERMOPLASTY;  Surgeon: Ward Whitaker MD;  Location: [facility]     Bronchial thermoplasty N/A 2/6/2015     Procedure: BRONCHIAL THERMOPLASTY;  Surgeon: Ward Whitaker MD;  Location: [facility]     Arthroscopy shoulder decompression Left 10/21/2015     Procedure: ARTHROSCOPY SHOULDER DECOMPRESSION;  Surgeon: Julien Milian MD;  Location:  OR       Social History   Substance Use Topics     Smoking status: Former Smoker     Years: 30.00     Quit date: 9/1/2015     Smokeless tobacco: Never Used      Comment: . started Chantix 8/9/15     Alcohol use No     Family History   Problem Relation Age of Onset     HEART DISEASE Mother      murmur, mi     Hypertension Mother      CEREBROVASCULAR DISEASE Mother      Depression Mother      Gallbladder Disease Mother      Family History Negative Father      does not know  him     HEART DISEASE Sister      murmur, hole in heart     Family History Negative Brother      HEART DISEASE Maternal Grandfather            Reviewed and updated as needed this visit by clinical staff       Reviewed and updated as needed this visit by Provider         ROS:  Constitutional, HEENT, cardiovascular, pulmonary, GI, , musculoskeletal, neuro, skin, endocrine and psych systems are negative, except as otherwise noted.    OBJECTIVE:                                                    /74 (BP Location: Right arm, Patient Position: Chair, Cuff Size: Adult Large)  Pulse 82  Temp 98.1  F (36.7  C) (Oral)  Ht 5' 3\" (1.6 m)  Wt 173 lb 14.4 oz (78.9 kg)  SpO2 97%  Breastfeeding? No  BMI 30.81 kg/m2  Body mass index is 30.81 kg/(m^2).  GENERAL: alert and intermittent cough  NECK: no adenopathy, no asymmetry, masses, or scars and thyroid normal to palpation  RESP: lungs clear to auscultation - no rales, rhonchi or wheezes  CV: regular rate and rhythm, normal S1 S2, no S3 or " S4, no murmur, click or rub, no peripheral edema and peripheral pulses strong  ABDOMEN: soft, nontender, no hepatosplenomegaly, no masses and bowel sounds normal  MS: no gross musculoskeletal defects noted, no edema  PSYCH: mentation appears normal, affect normal/bright     ASSESSMENT/PLAN:                                                        1. Chr Lung Disease - managed by the pulm dept ([facility])^^^  Continue current medications.   - oxyCODONE (ROXICODONE) 10 MG IR tablet; 5 times daily as needed  Dispense: 210 tablet; Refill: 0    2. Chr PAIN - Ctr SUBSTANCE AGREEMENT - SIGNED   Continue current medications.   - DEPRESSION ACTION PLAN (DAP)    3. Essential hypertension with goal blood pressure less than 140/90  under good control update labs at [facility]    4. DIAZ (nonalcoholic steatohepatitis)       5. 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 Chest wall pain     - oxyCODONE (ROXICODONE) 10 MG IR tablet; 5 times daily as needed  Dispense: 210 tablet; Refill: 0  - diazepam (VALIUM) 10 MG tablet; Take 1 tablet (10 mg) by mouth every 8 hours as needed for anxiety or sleep For sleep, anxiety, and muscle tension.  Dispense: 126 tablet; Refill: 0    7. Continuous opioid dependence ([facility]) opioid for chr cough and chest pain ^^^^     - oxyCODONE (ROXICODONE) 10 MG IR tablet; 5 times daily as needed  Dispense: 210 tablet; Refill: 0    8. Muscle pain     - diazepam (VALIUM) 10 MG tablet; Take 1 tablet (10 mg) by mouth every 8 hours as needed for anxiety or sleep For sleep, anxiety, and muscle tension.  Dispense: 126 tablet; Refill: 0    9. Anxiety     - diazepam (VALIUM) 10 MG tablet; Take 1 tablet (10 mg) by mouth every 8 hours as needed for anxiety or sleep For sleep, anxiety, and muscle tension.  Dispense: 126 tablet; Refill: 0    10. Candidiasis of vulva and vagina     - fluconazole (DIFLUCAN) 150 MG tablet; Take 1 tablet (150 mg) by mouth every 3 days  Dispense: 4 tablet; Refill: 0      Leeann Chandra,  MD  [facility]

## 2017-03-27 ENCOUNTER — OFFICE VISIT (OUTPATIENT)
Dept: INTERNAL MEDICINE | Facility: CLINIC | Age: 47
End: 2017-03-27
Payer: MEDICARE

## 2017-03-27 ENCOUNTER — TELEPHONE (OUTPATIENT)
Dept: INTERNAL MEDICINE | Facility: CLINIC | Age: 47
End: 2017-03-27

## 2017-03-27 VITALS
HEART RATE: 82 BPM | WEIGHT: 178 LBS | HEIGHT: 63 IN | TEMPERATURE: 97.8 F | DIASTOLIC BLOOD PRESSURE: 80 MMHG | BODY MASS INDEX: 31.54 KG/M2 | SYSTOLIC BLOOD PRESSURE: 128 MMHG | OXYGEN SATURATION: 98 %

## 2017-03-27 DIAGNOSIS — R07.89 CHEST WALL PAIN: ICD-10-CM

## 2017-03-27 DIAGNOSIS — K75.81 NASH (NONALCOHOLIC STEATOHEPATITIS): Chronic | ICD-10-CM

## 2017-03-27 DIAGNOSIS — J44.9 SEVERE CHRONIC OBSTRUCTIVE PULMONARY DISEASE (H): Chronic | ICD-10-CM

## 2017-03-27 DIAGNOSIS — R11.0 NAUSEA: ICD-10-CM

## 2017-03-27 DIAGNOSIS — F11.20 CONTINUOUS OPIOID DEPENDENCE (H): Chronic | ICD-10-CM

## 2017-03-27 DIAGNOSIS — F41.9 ANXIETY: ICD-10-CM

## 2017-03-27 DIAGNOSIS — I10 HTN, GOAL BELOW 140/90: Chronic | ICD-10-CM

## 2017-03-27 DIAGNOSIS — M79.10 MUSCLE PAIN: ICD-10-CM

## 2017-03-27 DIAGNOSIS — J45.909 UNCOMPLICATED ASTHMA, UNSPECIFIED ASTHMA SEVERITY: ICD-10-CM

## 2017-03-27 DIAGNOSIS — E78.5 HYPERLIPIDEMIA LDL GOAL <130: ICD-10-CM

## 2017-03-27 DIAGNOSIS — J31.0 CHRONIC RHINITIS: ICD-10-CM

## 2017-03-27 DIAGNOSIS — Z82.49 FAMILY HISTORY OF ISCHEMIC HEART DISEASE: ICD-10-CM

## 2017-03-27 DIAGNOSIS — I10 ESSENTIAL HYPERTENSION WITH GOAL BLOOD PRESSURE LESS THAN 140/90: Chronic | ICD-10-CM

## 2017-03-27 DIAGNOSIS — G89.29 CHRONIC PAIN WITH DRUG DEPENDENCE (H): Primary | Chronic | ICD-10-CM

## 2017-03-27 DIAGNOSIS — F19.20 CHRONIC PAIN WITH DRUG DEPENDENCE (H): Primary | Chronic | ICD-10-CM

## 2017-03-27 LAB
ERYTHROCYTE [DISTWIDTH] IN BLOOD BY AUTOMATED COUNT: 14.1 % (ref 10–15)
HCT VFR BLD AUTO: 43.5 % (ref 35–47)
HGB BLD-MCNC: 14.2 G/DL (ref 11.7–15.7)
INR PPP: 0.9 (ref 0.86–1.14)
MCH RBC QN AUTO: 29.9 PG (ref 26.5–33)
MCHC RBC AUTO-ENTMCNC: 32.6 G/DL (ref 31.5–36.5)
MCV RBC AUTO: 92 FL (ref 78–100)
PLATELET # BLD AUTO: 320 10E9/L (ref 150–450)
RBC # BLD AUTO: 4.75 10E12/L (ref 3.8–5.2)
WBC # BLD AUTO: 9.8 10E9/L (ref 4–11)

## 2017-03-27 PROCEDURE — 80076 HEPATIC FUNCTION PANEL: CPT | Performed by: INTERNAL MEDICINE

## 2017-03-27 PROCEDURE — 36415 COLL VENOUS BLD VENIPUNCTURE: CPT | Performed by: INTERNAL MEDICINE

## 2017-03-27 PROCEDURE — 85610 PROTHROMBIN TIME: CPT | Performed by: INTERNAL MEDICINE

## 2017-03-27 PROCEDURE — 80048 BASIC METABOLIC PNL TOTAL CA: CPT | Performed by: INTERNAL MEDICINE

## 2017-03-27 PROCEDURE — 85027 COMPLETE CBC AUTOMATED: CPT | Performed by: INTERNAL MEDICINE

## 2017-03-27 PROCEDURE — 99213 OFFICE O/P EST LOW 20 MIN: CPT | Performed by: INTERNAL MEDICINE

## 2017-03-27 RX ORDER — AMLODIPINE BESYLATE 10 MG/1
10 TABLET ORAL DAILY
Qty: 90 TABLET | Refills: 1 | Status: SHIPPED | OUTPATIENT
Start: 2017-03-27 | End: 2017-11-21

## 2017-03-27 RX ORDER — ONDANSETRON 8 MG/1
4-8 TABLET, FILM COATED ORAL EVERY 8 HOURS PRN
Qty: 30 TABLET | Refills: 1 | Status: SHIPPED | OUTPATIENT
Start: 2017-03-27 | End: 2017-09-21

## 2017-03-27 RX ORDER — DIAZEPAM 10 MG
10 TABLET ORAL EVERY 8 HOURS PRN
Qty: 150 TABLET | Refills: 0 | Status: SHIPPED | OUTPATIENT
Start: 2017-04-10 | End: 2017-05-22

## 2017-03-27 RX ORDER — FLUTICASONE PROPIONATE 50 MCG
2 SPRAY, SUSPENSION (ML) NASAL DAILY
Qty: 16 G | Refills: 11 | Status: ON HOLD | OUTPATIENT
Start: 2017-03-27 | End: 2018-02-09

## 2017-03-27 RX ORDER — ESCITALOPRAM OXALATE 10 MG/1
10 TABLET ORAL DAILY
Qty: 90 TABLET | Refills: 1 | Status: SHIPPED | OUTPATIENT
Start: 2017-03-27 | End: 2017-06-29

## 2017-03-27 RX ORDER — OXYCODONE HYDROCHLORIDE 10 MG/1
TABLET ORAL
Qty: 250 TABLET | Refills: 0 | Status: SHIPPED | OUTPATIENT
Start: 2017-04-10 | End: 2017-05-22

## 2017-03-27 RX ORDER — ATORVASTATIN CALCIUM 80 MG/1
80 TABLET, FILM COATED ORAL DAILY
Qty: 90 TABLET | Refills: 1 | Status: SHIPPED | OUTPATIENT
Start: 2017-03-27 | End: 2017-11-21

## 2017-03-27 RX ORDER — LISINOPRIL 20 MG/1
20 TABLET ORAL DAILY
Qty: 90 TABLET | Refills: 1 | Status: SHIPPED | OUTPATIENT
Start: 2017-03-27 | End: 2017-11-21

## 2017-03-27 NOTE — MR AVS SNAPSHOT
After Visit Summary   3/27/2017    Swetha Patterson    MRN: [id number]           Patient Information     Date Of Birth          1970        Visit Information        Provider Department      3/27/2017 11:00 AM Roro Chawla MD [facility]        Today's Diagnoses     Chr PAIN - Ctr SUBSTANCE AGREEMENT - SIGNED    -  1    Chest wall pain        Chr Lung Disease - managed by the pulm dept ([facility])^^^        Continuous opioid dependence ([facility]) opioid for chr cough and chest pain ^^^^        Nausea        Muscle pain        Anxiety        Uncomplicated asthma, unspecified asthma severity        Family history of ischemic heart disease        Hyperlipidemia LDL goal <130        HTN, goal below 140/90        Essential hypertension with goal blood pressure less than 140/90        Chronic rhinitis        DIAZ (nonalcoholic steatohepatitis)           Follow-ups after your visit        Your next 10 appointments already scheduled     Apr 21, 2017  9:20 AM CDT   [facility]    [address] [place] [id number] [phone]              Who to contact     If you have questions or need follow up information about today's clinic visit or your schedule please contact [facility] directly at [phone].  Normal or non-critical lab and imaging results will be communicated to you by MyChart, letter or phone within 4 business days after the clinic has received the results. If you do not hear from us within 7 days, please contact the clinic through MyChart or phone. If you have a critical or abnormal lab result, we will notify you by phone as soon as possible.  Submit refill requests through [employer] or call your pharmacy and they will forward the refill request to us. Please allow 3 business days for your refill to be completed.          Additional Information  "About Your Visit        [employer]) Information     [employer] lets you send messages to your doctor, view your test results, renew your prescriptions, schedule appointments and more. To sign up, go to [email or web address] . Click on \"Log in\" on the left side of the screen, which will take you to the Welcome page. Then click on \"Sign up Now\" on the right side of the page.     You will be asked to enter the access code listed below, as well as some personal information. Please follow the directions to create your username and password.     Your access code is: [email or web address]  Expires: 2017  6:42 PM     Your access code will  in 90 days. If you need help or a new code, please call your [place] clinic or [phone].        Care EveryWhere ID     This is your Care EveryWhere ID. This could be used by other organizations to access your [place] medical records  [id number]        Your Vitals Were     Pulse Temperature Height Pulse Oximetry BMI (Body Mass Index)       82 97.8  F (36.6  C) (Oral) 5' 3\" (1.6 m) 98% 31.53 kg/m2        Blood Pressure from Last 3 Encounters:   17 128/80   17 110/74   17 130/80    Weight from Last 3 Encounters:   17 178 lb (80.7 kg)   17 173 lb 14.4 oz (78.9 kg)   17 176 lb 4.8 oz (80 kg)              We Performed the Following     Basic metabolic panel     CBC with platelets     Hepatic panel     INR          Today's Medication Changes          These changes are accurate as of: 3/27/17 11:59 PM.  If you have any questions, ask your nurse or doctor.               These medicines have changed or have updated prescriptions.        Dose/Directions    escitalopram 10 MG tablet   Commonly known as:  LEXAPRO   This may have changed:    - how much to take  - how to take this  - when to take this  - additional instructions   Used for:  Anxiety   Changed by:  Roro Chawla MD        Dose:  10 mg   Take 1 tablet (10 mg) by mouth daily "   Quantity:  90 tablet   Refills:  1       ranitidine 75 MG tablet   Commonly known as:  ZANTAC   This may have changed:  when to take this   Used for:  Uncomplicated asthma, unspecified asthma severity   Changed by:  Roro Chawla MD        Dose:  150 mg   Take 2 tablets (150 mg) by mouth daily   Quantity:  90 tablet   Refills:  1            Where to get your medicines      These medications were sent to [facility] [address] [place] [address] AT [facility] [address] [address], [facility] [address]    Hours:  24-hours Phone:  [phone]     amLODIPine 10 MG tablet    atorvastatin 80 MG tablet    cetirizine HCl 10 MG Caps    escitalopram 10 MG tablet    fluticasone 50 MCG/ACT spray    lisinopril 20 MG tablet    ondansetron 8 MG tablet    ranitidine 75 MG tablet         Some of these will need a paper prescription and others can be bought over the counter.  Ask your nurse if you have questions.     Bring a paper prescription for each of these medications     diazepam 10 MG tablet    oxyCODONE 10 MG IR tablet                Primary Care Provider Office Phone # Fax #    Roro Chawla [phone]       [facility] [address] [place] [address]        Thank you!     Thank you for choosing [facility]  for your care. Our goal is always to provide you with excellent care. Hearing back from our patients is one way we can continue to improve our services. Please take a few minutes to complete the written survey that you may receive in the mail after your visit with us. Thank you!             Your Updated Medication List - Protect others around you: Learn how to safely use, store and throw away your medicines at www.disposemymeds.org.          This list is accurate as of: 3/27/17 11:59 PM.  Always use your most recent med list.                   Brand Name Dispense Instructions for use     albuterol 108 (90 BASE) MCG/ACT Inhaler    VENTOLIN HFA    3 Inhaler    Inhale 2 puffs into the lungs every 6 hours       amLODIPine 10 MG tablet    NORVASC    90 tablet    Take 1 tablet (10 mg) by mouth daily       atorvastatin 80 MG tablet    LIPITOR    90 tablet    Take 1 tablet (80 mg) by mouth daily       budesonide-formoterol 160-4.5 MCG/ACT Inhaler    SYMBICORT    3 Inhaler    Inhale 2 puffs into the lungs 2 times daily       cetirizine HCl 10 MG Caps     90 capsule    Take 1 capsule by mouth daily       chlorhexidine 4 % liquid    HIBICLENS    946 mL    Wash ab, groin, thighs daily in shower DO NOT PUT ON FACE       clindamycin-benzoyl peroxide gel    BENZACLIN    100 g    Apply topically 2 times daily       desonide 0.05 % cream    DESOWEN    60 g    Apply sparingly to affected area three times daily as needed.       diazepam 10 MG tablet   Start taking on:  4/10/2017    VALIUM    150 tablet    Take 1 tablet (10 mg) by mouth every 8 hours as needed for anxiety or sleep For sleep, anxiety, and muscle tension.       escitalopram 10 MG tablet    LEXAPRO    90 tablet    Take 1 tablet (10 mg) by mouth daily       fluticasone 50 MCG/ACT spray    FLONASE    16 g    Spray 2 sprays into both nostrils daily       furosemide 20 MG tablet    LASIX    90 tablet    Take 1 tablet (20 mg) by mouth daily as needed       ipratropium - albuterol 0.5 mg/2.5 mg/3 mL 0.5-2.5 (3) MG/3ML neb solution    DUONEB    360 mL    Take 1 vial (3 mLs) by nebulization every 6 hours       irbesartan 300 MG tablet    AVAPRO    90 tablet    Take 1 tablet (300 mg) by mouth daily       ketoconazole 2 % shampoo    NIZORAL    120 mL    Apply to the affected area and wash off after 5 minutes.       lisinopril 20 MG tablet    PRINIVIL/ZESTRIL    90 tablet    Take 1 tablet (20 mg) by mouth daily       montelukast 10 MG tablet    SINGULAIR    30 tablet    Take 1 tablet (10 mg) by mouth At Bedtime       NICORETTE 2 MG lozenge   Generic drug:  nicotine  polacrilex      PLACE 1 LOZENGE INSIDE CHEEK Q HOUR PRF SMOKING CESSATION UTD       ondansetron 8 MG tablet    ZOFRAN    30 tablet    Take 0.5-1 tablets (4-8 mg) by mouth every 8 hours as needed for nausea       order for DME     1 Device    Equipment being ordered: Nebulizer machine Length of need-lifetime       oxyCODONE 10 MG IR tablet   Start taking on:  4/10/2017    ROXICODONE    250 tablet    5 times daily as needed       pantoprazole 20 MG EC tablet    PROTONIX    90 tablet    Take 2 tablets (40 mg) by mouth every morning (before breakfast)       prochlorperazine 5 MG tablet    COMPAZINE    90 tablet    Take 1 tablet (5 mg) by mouth every 6 hours as needed for nausea or vomiting       ranitidine 75 MG tablet    ZANTAC    90 tablet    Take 2 tablets (150 mg) by mouth daily       tiotropium 18 MCG capsule    SPIRIVA HANDIHALER    30 capsule    Inhale contents of one capsule daily.       VITAMIN D (CHOLECALCIFEROL) PO      Take 2,000 Units by mouth daily

## 2017-03-27 NOTE — LETTER
March 29, 2017       TO: Swetha Patterson  [address]  [facility] [place] [facility] [place] [address]       Dear Ms. Patterson,    We are writing to inform you of your test results.  It all looks good.     Resulted Orders   Basic metabolic panel   Result Value Ref Range    Sodium 140 133 - 144 mmol/L    Potassium 4.4 3.4 - 5.3 mmol/L    Chloride 106 94 - 109 mmol/L    Carbon Dioxide 26 20 - 32 mmol/L    Anion Gap 8 3 - 14 mmol/L    Glucose 87 70 - 99 mg/dL    Urea Nitrogen 11 7 - 30 mg/dL    Creatinine 0.70 0.52 - 1.04 mg/dL    GFR Estimate 90 >60 mL/min/1.7m2      Comment:      Non  GFR Calc    GFR Estimate If Black >90   GFR Calc   >60 mL/min/1.7m2    Calcium 8.8 8.5 - 10.1 mg/dL   CBC with platelets   Result Value Ref Range    WBC 9.8 4.0 - 11.0 10e9/L    RBC Count 4.75 3.8 - 5.2 10e12/L    Hemoglobin 14.2 11.7 - 15.7 g/dL    Hematocrit 43.5 35.0 - 47.0 %    MCV 92 78 - 100 fl    MCH 29.9 26.5 - 33.0 pg    MCHC 32.6 31.5 - 36.5 g/dL    RDW 14.1 10.0 - 15.0 %    Platelet Count 320 150 - 450 10e9/L   Hepatic panel   Result Value Ref Range    Bilirubin Direct <0.1 0.0 - 0.2 mg/dL    Bilirubin Total 0.3 0.2 - 1.3 mg/dL    Albumin 3.6 3.4 - 5.0 g/dL    Protein Total 7.3 6.8 - 8.8 g/dL    Alkaline Phosphatase 125 40 - 150 U/L    ALT 20 0 - 50 U/L    AST 10 0 - 45 U/L   INR   Result Value Ref Range    INR 0.90 0.86 - 1.14         It was a pleasure to see you at your recent visit. Please let me know if you have any questions or concerns.     Clinic Staff - [phone]     Sincerely,     Henok Vsáquez MD  [address] [facility] [address]  [place] [address]

## 2017-03-27 NOTE — TELEPHONE ENCOUNTER
"  Panel Management Review      Patient has the following on her problem list:     Asthma review     ACT Total Scores 1/23/2017   ACT TOTAL SCORE (Goal Greater than or Equal to 20) 10   In the past 12 months, how many times did you visit the emergency room for your asthma without being admitted to the hospital? 0   In the past 12 months, how many times were you hospitalized overnight because of your asthma? 0      1. Is Asthma diagnosis on the Problem List? Yes    2. Is Asthma listed on Health Maintenance? Yes    3. Patient is due for:  none      Composite cancer screening  Chart review shows that this patient is due/due soon for the following Mammogram  Summary:    Patient is due/failing the following:   Due for mammogram. She is on our list because she is failing \"optimal asthma management\".     Action needed:   Needs to schedule mammogram.     Type of outreach:    None needed. She actually has an appointment later today with Dr. Low. We have discussed mammogram, the only reason she has not had this done yet is because she has been living in [place] for the last several months and only coming up here every few weeks for her doctor appointments. Will discuss this with her again today when she comes, but she has had a hard time coordinating this. She is also failing for asthma, but she has chronic lung issues and is working with Dr. Low and a pulmonologist. She will probably never have a good ACT score, her breathing is actually improved right now from where it was before.     Questions for provider review:    None                                                                                                                                    Carolyn Villarreal [facility]       Chart not routed.          "

## 2017-03-27 NOTE — PROGRESS NOTES
Patient's instructions / PLAN:                                                        Plan:  1. Labs today  2. Continue same meds, same doses for now       ASSESSMENT & PLAN:                                                      (G89.29,  F19.20) Chr PAIN - Ctr SUBSTANCE AGREEMENT - SIGNED  (primary encounter diagnosis)  Comment:   Plan: Drug abuse screen, urine (Pain Care Package),         CANCELED: Drug abuse screen, urine (Pain Care         Package)            (R07.89) Chest wall pain  Comment:   Plan: oxyCODONE (ROXICODONE) 10 MG IR tablet,         diazepam (VALIUM) 10 MG tablet            (J44.9) Chr Lung Disease - managed by the pulm dept ([facility])^^^  Comment:   Plan: oxyCODONE (ROXICODONE) 10 MG IR tablet,         fluticasone (FLONASE) 50 MCG/ACT spray            (F11.20) Continuous opioid dependence ([facility]) opioid for chr cough and chest pain ^^^^  Comment:   Plan: oxyCODONE (ROXICODONE) 10 MG IR tablet            (R11.0) Nausea  Comment:   Plan: ondansetron (ZOFRAN) 8 MG tablet            (M79.1) Muscle pain  Comment:   Plan: diazepam (VALIUM) 10 MG tablet            (F41.9) Anxiety  Comment:   Plan: diazepam (VALIUM) 10 MG tablet, escitalopram         (LEXAPRO) 10 MG tablet            (J45.909) Uncomplicated asthma, unspecified asthma severity  Comment:   Plan: ranitidine (ZANTAC) 75 MG tablet            (Z82.49) Family history of ischemic heart disease  Comment:   Plan: atorvastatin (LIPITOR) 80 MG tablet            (E78.5) Hyperlipidemia LDL goal <130  Comment:   Plan: atorvastatin (LIPITOR) 80 MG tablet            (I10) HTN, goal below 140/90  Comment:   Plan: amLODIPine (NORVASC) 10 MG tablet            (I10) Essential hypertension with goal blood pressure less than 140/90  Comment:   Plan: lisinopril (PRINIVIL/ZESTRIL) 20 MG tablet            (J31.0) Chronic rhinitis  Comment:   Plan: cetirizine HCl 10 MG CAPS            (K75.81) DIAZ (nonalcoholic steatohepatitis)  Comment:   Plan:        Chief Complaint:   "                                                      Follow up chronic medical problems       SUBJECTIVE:                                                    History of present illness     Overall she feels stable. No other issues. She needs meds refills.     She feels better when she is [place]. She is thinking about moving. It depends how she does with the pulmonologist and ENT here.     She wants to go back to [place] on March 29 and coming back on  May 28     ROS:                                                      ROS: negative for fever, chills, cough, wheezes, chest pain, shortness of breath, vomiting, abdominal pain, leg swelling Pos for chr cough     A 10-point review of systems was obtained.  Those pertinent are above and in the in the Subjective section.  The rest of the systems are negative.        OBJECTIVE:                                                    Physical Exam :    Blood pressure 128/80, pulse 82, temperature 97.8  F (36.6  C), temperature source Oral, height 5' 3\" (1.6 m), weight 178 lb (80.7 kg), SpO2 98 %, not currently breastfeeding.   NAD, appears comfortable  Skin: no rashes   Chest: clear to auscultation bilaterally, good respiratory effort  Heart: S1 S2, RRR, no mgr appreciated  Abdomen: soft, not tender,   Extremities: no edema,  Neurologic: A, Ox3, no focal signs appreciated    PMHx: reviewed  Past Medical History:   Diagnosis Date     Anxiety state, unspecified      Arthritis     right hip arthritis     Asthma      Chronic pain     back pain from cyst     Contact dermatitis and other eczema, due to unspecified cause      COPD (chronic obstructive pulmonary disease) (H)      Depressive disorder, not elsewhere classified      Emphysema with chronic bronchitis (H)      Esophageal reflux      Family history of ischemic heart disease      Gastro-oesophageal reflux disease      HTN, goal below 140/90      Hyperlipidemia LDL goal <130      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)      PONV " (postoperative nausea and vomiting)       PSHx: reviewed  Past Surgical History:   Procedure Laterality Date     ARTHROSCOPY SHOULDER DECOMPRESSION Left 10/21/2015    Procedure: ARTHROSCOPY SHOULDER DECOMPRESSION;  Surgeon: Julien Milian MD;  Location: [facility] OR     BRONCHIAL THERMOPLASTY N/A 11/14/2014    Procedure: BRONCHIAL THERMOPLASTY;  Surgeon: Ward Whitaker MD;  Location: [facility]     BRONCHIAL THERMOPLASTY N/A 12/19/2014    Procedure: BRONCHIAL THERMOPLASTY;  Surgeon: Ward Whitaker MD;  Location: [facility] OR     BRONCHIAL THERMOPLASTY N/A 2/6/2015    Procedure: BRONCHIAL THERMOPLASTY;  Surgeon: Ward Whitaker MD;  Location: [facility] OR     C APPENDECTOMY  at age 17     EXCISE NODE MEDIASTINAL  4/26/2013    Procedure: EXCISE NODE MEDIASTINAL;;  Surgeon: Av Peña MD;  Location: [facility] OR     THORACOSCOPY  4/26/2013    Procedure: THORACOSCOPY;  LEFT VIDEO ASSISTED THORACOSCOPY, RESECTION OF POSTERIOR MEDIASTINAL MASS;  Surgeon: Av Peña MD;  Location:  OR     TONSILLECTOMY  as a kid        Meds: reviewed  Current Outpatient Prescriptions   Medication Sig Dispense Refill     diazepam (VALIUM) 10 MG tablet Take 1 tablet (10 mg) by mouth every 8 hours as needed for anxiety or sleep For sleep, anxiety, and muscle tension. 126 tablet 0     montelukast (SINGULAIR) 10 MG tablet Take 1 tablet (10 mg) by mouth At Bedtime 30 tablet 1     atorvastatin (LIPITOR) 80 MG tablet Take 1 tablet (80 mg) by mouth daily 90 tablet 1     furosemide (LASIX) 20 MG tablet Take 1 tablet (20 mg) by mouth daily as needed 90 tablet 1     lisinopril (PRINIVIL/ZESTRIL) 20 MG tablet Take 1 tablet (20 mg) by mouth daily 90 tablet 1     budesonide-formoterol (SYMBICORT) 160-4.5 MCG/ACT Inhaler Inhale 2 puffs into the lungs 2 times daily 3 Inhaler 3     ipratropium - albuterol 0.5 mg/2.5 mg/3 mL (DUONEB) 0.5-2.5 (3) MG/3ML neb solution Take 1 vial (3 mLs) by nebulization every 6 hours 360 mL 0      tiotropium (SPIRIVA HANDIHALER) 18 MCG capsule Inhale contents of one capsule daily. 30 capsule 1     escitalopram (LEXAPRO) 10 MG tablet Take 1/2 tab daily for one week, then one tab daily. For anxiety and mood. 30 tablet 1     pantoprazole (PROTONIX) 20 MG tablet Take 2 tablets (40 mg) by mouth every morning (before breakfast) 90 tablet 3     prochlorperazine (COMPAZINE) 5 MG tablet Take 1 tablet (5 mg) by mouth every 6 hours as needed for nausea or vomiting 90 tablet 0     amLODIPine (NORVASC) 10 MG tablet Take 1 tablet (10 mg) by mouth daily 90 tablet 1     chlorhexidine (HIBICLENS) 4 % external liquid Wash ab, groin, thighs daily in shower DO NOT PUT ON FACE 946 mL 3     ranitidine (ZANTAC) 75 MG tablet Take 2 tablets (150 mg) by mouth 2 times daily 60 tablet 3     VITAMIN D, CHOLECALCIFEROL, PO Take 2,000 Units by mouth daily       cetirizine HCl 10 MG CAPS Take 1 capsule by mouth daily 90 capsule 3     oxyCODONE (ROXICODONE) 10 MG IR tablet 5 times daily as needed 210 tablet 0     irbesartan (AVAPRO) 300 MG tablet Take 1 tablet (300 mg) by mouth daily 90 tablet 1     NICORETTE 2 MG lozenge PLACE 1 LOZENGE INSIDE CHEEK Q HOUR PRF SMOKING CESSATION UTD  1     fluticasone (FLONASE) 50 MCG/ACT spray Spray 2 sprays into both nostrils daily 16 g 11     albuterol (VENTOLIN HFA) 108 (90 BASE) MCG/ACT Inhaler Inhale 2 puffs into the lungs every 6 hours 3 Inhaler 3     ketoconazole (NIZORAL) 2 % shampoo Apply to the affected area and wash off after 5 minutes. 120 mL 1     desonide (DESOWEN) 0.05 % cream Apply sparingly to affected area three times daily as needed. 60 g 0     order for DME Equipment being ordered: Nebulizer machine  Length of need-lifetime 1 Device 0     clindamycin-benzoyl peroxide (BENZACLIN) gel Apply topically 2 times daily 100 g 3     ondansetron (ZOFRAN) 8 MG tablet Take 0.5-1 tablets (4-8 mg) by mouth every 8 hours as needed for nausea 30 tablet 1       Soc Hx: reviewed  Fam Hx:  reviewed          Roro Low MD  Internal Medicine

## 2017-03-27 NOTE — NURSING NOTE
"Chief Complaint   Patient presents with     Recheck Medication       Initial /80  Pulse 82  Temp 97.8  F (36.6  C) (Oral)  Ht 5' 3\" (1.6 m)  Wt 178 lb (80.7 kg)  SpO2 98%  BMI 31.53 kg/m2 Estimated body mass index is 31.53 kg/(m^2) as calculated from the following:    Height as of this encounter: 5' 3\" (1.6 m).    Weight as of this encounter: 178 lb (80.7 kg).  Medication Reconciliation: complete    "

## 2017-03-28 LAB
ALBUMIN SERPL-MCNC: 3.6 G/DL (ref 3.4–5)
ALP SERPL-CCNC: 125 U/L (ref 40–150)
ALT SERPL W P-5'-P-CCNC: 20 U/L (ref 0–50)
ANION GAP SERPL CALCULATED.3IONS-SCNC: 8 MMOL/L (ref 3–14)
AST SERPL W P-5'-P-CCNC: 10 U/L (ref 0–45)
BILIRUB DIRECT SERPL-MCNC: <0.1 MG/DL (ref 0–0.2)
BILIRUB SERPL-MCNC: 0.3 MG/DL (ref 0.2–1.3)
BUN SERPL-MCNC: 11 MG/DL (ref 7–30)
CALCIUM SERPL-MCNC: 8.8 MG/DL (ref 8.5–10.1)
CHLORIDE SERPL-SCNC: 106 MMOL/L (ref 94–109)
CO2 SERPL-SCNC: 26 MMOL/L (ref 20–32)
CREAT SERPL-MCNC: 0.7 MG/DL (ref 0.52–1.04)
GFR SERPL CREATININE-BSD FRML MDRD: 90 ML/MIN/1.7M2
GLUCOSE SERPL-MCNC: 87 MG/DL (ref 70–99)
POTASSIUM SERPL-SCNC: 4.4 MMOL/L (ref 3.4–5.3)
PROT SERPL-MCNC: 7.3 G/DL (ref 6.8–8.8)
SODIUM SERPL-SCNC: 140 MMOL/L (ref 133–144)

## 2017-04-01 ENCOUNTER — TELEPHONE (OUTPATIENT)
Dept: INTERNAL MEDICINE | Facility: CLINIC | Age: 47
End: 2017-04-01

## 2017-04-01 NOTE — TELEPHONE ENCOUNTER
Please call her that because she didn't do the urine drug test at the last appointment as we discussed,  according to the controlled substance contract I will not be able to continue refilling her Rx

## 2017-04-04 DIAGNOSIS — F19.20 CHRONIC PAIN WITH DRUG DEPENDENCE (H): Chronic | ICD-10-CM

## 2017-04-04 DIAGNOSIS — G89.29 CHRONIC PAIN WITH DRUG DEPENDENCE (H): Chronic | ICD-10-CM

## 2017-04-04 PROCEDURE — 80306 DRUG TEST PRSMV INSTRMNT: CPT | Performed by: INTERNAL MEDICINE

## 2017-04-04 NOTE — TELEPHONE ENCOUNTER
Patient advised of MD message, states she was planning on coming into clinic today to leave the urine sample today before she got this call.   Watch for results.  SERJIO Chandra R.N.

## 2017-04-05 LAB
AMPHETAMINES UR QL: ABNORMAL NG/ML
BARBITURATES UR QL SCN: ABNORMAL NG/ML
BENZODIAZ UR QL SCN: ABNORMAL NG/ML
BUPRENORPHINE UR QL: ABNORMAL NG/ML
CANNABINOIDS UR QL: ABNORMAL NG/ML
COCAINE UR QL SCN: ABNORMAL NG/ML
D-METHAMPHET UR QL: ABNORMAL NG/ML
METHADONE UR QL SCN: ABNORMAL NG/ML
OPIATES UR QL SCN: ABNORMAL NG/ML
OXYCODONE UR QL SCN: ABNORMAL NG/ML
PCP UR QL SCN: ABNORMAL NG/ML
PROPOXYPH UR QL: ABNORMAL NG/ML
TRICYCLICS UR QL SCN: ABNORMAL NG/ML

## 2017-04-07 NOTE — TELEPHONE ENCOUNTER
I will review the results next appointment but she can't choose when she goes for urine test - she goes when the clinic tells her

## 2017-04-07 NOTE — TELEPHONE ENCOUNTER
"Called pt-got message \"the person you are trying to reach is not accepting calls at this time\". Will try later   "

## 2017-05-22 DIAGNOSIS — F41.9 ANXIETY: ICD-10-CM

## 2017-05-22 DIAGNOSIS — R07.89 CHEST WALL PAIN: ICD-10-CM

## 2017-05-22 DIAGNOSIS — F11.20 CONTINUOUS OPIOID DEPENDENCE (H): Chronic | ICD-10-CM

## 2017-05-22 DIAGNOSIS — J44.9 SEVERE CHRONIC OBSTRUCTIVE PULMONARY DISEASE (H): Chronic | ICD-10-CM

## 2017-05-22 DIAGNOSIS — M79.10 MUSCLE PAIN: ICD-10-CM

## 2017-05-22 NOTE — TELEPHONE ENCOUNTER
Pt calling.  Needs to schedule f/u appt to get meds--Oxycodone and Valium and her lungs checked but no appts available x 2 wks.  Pt will run out of meds next week.      Work in?  Or ok to authorize refills until next available?    Valium and Oxycodone      Last Written Prescription Date:  4-10-17 for both  Last Fill Quantity: 150, 250,   # refills: 0 for both  Last Office Visit with [facility] or [facility] prescribing provider: 4-21-17  Future Office visit:       Routing refill request to provider for review/approval because:  Drug not on the [facility], [facility] or [facility] refill protocol or controlled substance    Signed CSA form in chart.    Please hand carry Oxy rx to [facility].    Please advise, thanks.

## 2017-05-23 RX ORDER — OXYCODONE HYDROCHLORIDE 10 MG/1
TABLET ORAL
Qty: 150 TABLET | Refills: 0 | Status: SHIPPED | OUTPATIENT
Start: 2017-05-23 | End: 2017-06-29

## 2017-05-23 RX ORDER — DIAZEPAM 10 MG
10 TABLET ORAL EVERY 8 HOURS PRN
Qty: 120 TABLET | Refills: 0 | Status: SHIPPED | OUTPATIENT
Start: 2017-05-23 | End: 2017-06-29

## 2017-05-24 ENCOUNTER — TELEPHONE (OUTPATIENT)
Dept: PSYCHIATRY | Facility: CLINIC | Age: 47
End: 2017-05-24

## 2017-05-24 NOTE — TELEPHONE ENCOUNTER
Reason for call:  Other   Patient called regarding (reason for call): returning call  Additional comments: Returned Brenda's call back.     Phone number to reach patient:  Home number on file [phone] (home)    Best Time:  Any time    Can we leave a detailed message on this number?  YES

## 2017-05-24 NOTE — TELEPHONE ENCOUNTER
Reason for call:  Other   Patient called regarding (reason for call): prescription  Additional comments: Swetha called in a panic. She doesn't feel like her valium is working. Has had frequent panic attacks and talked a great deal about a lydia stalking her. She confirmed she was safe and was ok with getting a call back. Also, I scheduled her with Milena on 06/29/2017.      Phone number to reach patient:  Home number on file [phone] (home)    Best Time:  Any time    Can we leave a detailed message on this number?  YES

## 2017-05-24 NOTE — TELEPHONE ENCOUNTER
Noted. Thank you. I agree with that plan. Has not followed with me as planned. No medication additions at this time until I see her again. I recommend following with Primary Care Provider and Emergency Department as needed.

## 2017-05-24 NOTE — TELEPHONE ENCOUNTER
Left pt a message to go to ER or to call 911 if she is unsafe. Explained I would route the message to provider.       From last office visit with Milena Joseph, PhD, APRN, CNP in 12/1/16  Assessment:  Swetha Patterson reports not taking her medication for 2 weeks. Denies withdrawal effects.  Medication side effects and alternatives were reviewed.      I recommend that all controlled substances continue to be prescribed per Primary Care Provider.   Next appointment is scheduled for Monday 12/5/2016.  A Controlled Substance Agreement should be in place with her Primary Care Provider.   Strongly recommend therapy for additional support of significant health conditions and psychosocial distress.   Reviewed calling [facility] to scheduled with a [place] therapist or counselor, and be referred out for community therapy if needed.   Black Box Warning of benzodiazepines and opioids reviewed.    There is no indication for multiple benzodiazepines with no daily medication to control anxiety.   I reviewed this with Swetha today and reviewed that is what we originally decided at our first visit.   We can continue with Valium (diazepam) for now and wait until better management of anxiety occurs with therapy, improvement in physical status, and addition of new anti- anxiety medication of Lexapro (escitalopram).      Treatment Plan:    Discontinue Klonopin (clonazepam).    Continue Valium (diazepam) 10 mg two times per day for anxiety. FUTURE REFILLS WILL COME FROM PCP.    Start Lexapro (escitalopram) 5 mg by mouth daily for one week then increase to 10 mg by mouth daily for anxiety.     Continue all other medications as reviewed per electronic medical record today.     All questions addressed. Education and counseling completed regarding risks and benefits of medications and psychotherapy options.    Safety plan was reviewed. To the Emergency Department as needed or call after hours crisis line at [phone] or [phone].      To schedule individual or family therapy, call [facility] at [phone].    Schedule an appointment with me in 1-2 or sooner as needed. Call [facility] at [phone] to schedule.    Follow up with primary care provider as planned or for acute medical concerns.    Call the psychiatric nurse line with medication questions or concerns at [phone].    My Practice Policy was reviewed and signed: YES      Administrative Billing:   Time spent with patient was 30 minutes and greater than 50% of time or 20 minutes was spent in counseling and coordination of care.     Signed:   Milena Joseph, PhD, APRN, CNP   Psychiatry

## 2017-06-13 DIAGNOSIS — L73.2 HIDRADENITIS SUPPURATIVA: ICD-10-CM

## 2017-06-13 DIAGNOSIS — F41.9 ANXIETY: ICD-10-CM

## 2017-06-13 RX ORDER — DOXYCYCLINE 100 MG/1
TABLET ORAL
Qty: 42 TABLET | Refills: 0 | Status: SHIPPED | OUTPATIENT
Start: 2017-06-13 | End: 2017-09-19

## 2017-06-13 NOTE — TELEPHONE ENCOUNTER
"1) Pt calls, states she has used doxycycline for flare ups of a dermatology issue. Pt reports Dr. Low has written rx for it in the past. Per epic med list PCP has written rx for doxycycline with dx r/t respiratory issues, but not derm issues. Rx written for derm last from Dr. Jose Francisco Maciel with dx: Hidradenitis suppurativa.     Pt asking if PCP would send in doxycycline rx as she is having flare up consisting of closed, red boils on her stomach and crack of her bottom. It's uncomfortable. Denies boils being open at this time. Indicates if she can get her rx it resolves the boils quickly.    Doxycycline      Last Written Prescription Date: 03/10/16; d/c'd 04/08/16 with reason: \"alternate therapy\"  Last Fill Quantity: 60,  # refills: 3   Last Office Visit with [facility] or [facility] prescribing provider: 04/21/17                                         Next 5 appointments (look out 90 days)     Jun 28, 2017  2:00 PM CDT   Office Visit with Ela Alvarez MD   [facility]    [address] [place] [id number] [phone]            Jun 29, 2017  8:45 AM CDT   Return Visit with Milena Joseph NP   [facility] ([facility])    [address]  [address]  [facility] [id number] [phone]                  2) Also requesting rx of diflucan as she typically gets a yeast infection with antibx.    Please advise, thanks.      "

## 2017-06-14 ENCOUNTER — TELEPHONE (OUTPATIENT)
Dept: PSYCHIATRY | Facility: CLINIC | Age: 47
End: 2017-06-14

## 2017-06-14 NOTE — TELEPHONE ENCOUNTER
Reason for call:  Other   Patient called regarding (reason for call): prescription  Additional comments: Re-evaluate medications. Increased anxiety and groggy.     Phone number to reach patient:  Other phone number: [phone]    Best Time:  anytime    Can we leave a detailed message on this number?  YES

## 2017-06-15 RX ORDER — HYDROXYZINE HYDROCHLORIDE 25 MG/1
25 TABLET, FILM COATED ORAL 3 TIMES DAILY
Qty: 120 TABLET | Refills: 1 | Status: SHIPPED | OUTPATIENT
Start: 2017-06-15 | End: 2017-06-29

## 2017-06-15 NOTE — TELEPHONE ENCOUNTER
"Patient last seen in December of 2016. Per provider she needs to be seen in clinic to evaluate medications. She is to see her PCP or the ER in the interim.   RN spoke to Swetha Patterson and relayed the information above again. Patient hung up the phone after saying, \"Alright.\"       Telephone  Open      5/24/2017  [facility]    Milena Joseph NP   Nurse Practitioner Psych/Mental Health    Medication Question   Reason for call    Conversation: Medication Question   (Newest Message First)   May 25, 2017   Me        8:56 AM   Note      Patient has med check with Milena Joseph, PhD, APRN, CNP on 6/29/2017.         May 24, 2017            4:28 PM   Ilir Ferrer routed this conversation to Psych Rn - [facility]    Milena Joseph NP   to Brenda Heredia RN        4:27 PM   Note      Noted. Thank you. I agree with that plan. Has not followed with me as planned. No medication additions at this time until I see her again. I recommend following with Primary Care Provider and Emergency Department as needed.         Ilir Ferrer        4:26 PM   Note      Reason for call:  Other   Patient called regarding (reason for call): returning call  Additional comments: Returned Brenda's call back.      Phone number to reach patient:  Home number on file [phone] (home)     Best Time:  Any time     Can we leave a detailed message on this number?  YES                  4:26 PM      Swetha COOMBSAnge Rice contacted Ilir Ferrer Melissa, RN   to Milena Joseph NP           4:00 PM   Looks like she has not seen you since December. I left her a Vm to go to ED or to call 911 if she is feeling unsafe/a danger to herself.             3:57 PM      Brenda Heredia, NNEKA attempted to contact Swetha Patterson (Left Message)    Brenda Heredia RN        3:56 PM   Note      Left pt a message to go to ER or to call 911 if she is unsafe. Explained I would route the message to provider.         From last " office visit with Milena Joseph, PhD, APRN, CNP in 12/1/16  Assessment:  Swetha Patterson reports not taking her medication for 2 weeks. Denies withdrawal effects.  Medication side effects and alternatives were reviewed.       I recommend that all controlled substances continue to be prescribed per Primary Care Provider.   Next appointment is scheduled for Monday 12/5/2016.  A Controlled Substance Agreement should be in place with her Primary Care Provider.   Strongly recommend therapy for additional support of significant health conditions and psychosocial distress.   Reviewed calling [facility] to scheduled with a [place] therapist or counselor, and be referred out for community therapy if needed.   Black Box Warning of benzodiazepines and opioids reviewed.    There is no indication for multiple benzodiazepines with no daily medication to control anxiety.   I reviewed this with Swetha today and reviewed that is what we originally decided at our first visit.   We can continue with Valium (diazepam) for now and wait until better management of anxiety occurs with therapy, improvement in physical status, and addition of new anti- anxiety medication of Lexapro (escitalopram).       Treatment Plan:    Discontinue Klonopin (clonazepam).    Continue Valium (diazepam) 10 mg two times per day for anxiety. FUTURE REFILLS WILL COME FROM PCP.    Start Lexapro (escitalopram) 5 mg by mouth daily for one week then increase to 10 mg by mouth daily for anxiety.     Continue all other medications as reviewed per electronic medical record today.     All questions addressed. Education and counseling completed regarding risks and benefits of medications and psychotherapy options.    Safety plan was reviewed. To the Emergency Department as needed or call after hours crisis line at [phone] or [phone].     To schedule individual or family therapy, call [facility] at [phone].    Schedule an appointment with  me in 1-2 or sooner as needed. Call [facility] at [phone] to schedule.    Follow up with primary care provider as planned or for acute medical concerns.    Call the psychiatric nurse line with medication questions or concerns at [phone].    My Practice Policy was reviewed and signed: YES       Administrative Billing:   Time spent with patient was 30 minutes and greater than 50% of time or 20 minutes was spent in counseling and coordination of care.      Signed:   Milena Joseph, PhD, APRN, CNP   Psychiatry                     2:37 PM   Ilir Ferrer routed this conversation to Psych Rn - [facility]    Ilir Ferrer        2:33 PM   Note      Reason for call:  Other   Patient called regarding (reason for call): prescription  Additional comments: Swetha called in a panic. She doesn't feel like her valium is working. Has had frequent panic attacks and talked a great deal about a lydia stalking her. She confirmed she was safe and was ok with getting a call back. Also, I scheduled her with Milena on 06/29/2017.       Phone number to reach patient:  Home number on file [phone] (home)     Best Time:  Any time     Can we leave a detailed message on this number?  YES                  2:32 PM      Swetha Patterson contacted Ilir Ferrer    This encounter is not signed. The conversation may still be ongoing.   Additional Documentation   Encounter Info:     Billing Info,     History,     Allergies,     Detailed Report        SmartForms   [facility] Call Tracking   Orders Placed     None   Medication Renewals and Changes       None      Medication List   Visit Diagnoses       None      Problem List

## 2017-06-15 NOTE — TELEPHONE ENCOUNTER
Thank you.   I will not be adjusting or adding any medications until I can see her.   I have not seen her since December 2016.

## 2017-06-15 NOTE — TELEPHONE ENCOUNTER
1.  Pt calling back asking if Doxy and diflucan faxed to pharm.    Advised Doxy faxed but not Diflucan.  Pt is asking for Diflucan d/t gets yeast inf from abx.    2.  Also, states anxiety is increasing and asking for refill on Hydroxyzine 25mg.  Attempted to get refill from Milena (see TE 6-14-17).  Has future appt with Milena 6-29-17.    Hydroxyzine      Last Written Prescription Date: 3-11-16  Last Fill Quantity: 120,  # refills: 1   Last Office Visit with [facility], [facility] or [facility] prescribing provider: 3-27-17                                         Next 5 appointments (look out 90 days)     Jun 28, 2017  2:00 PM CDT   Office Visit with Ela Alvarez MD   [facility]    [address] [place] [id number] [phone]            Jun 29, 2017  8:45 AM CDT   Return Visit with Milena Joseph NP   [facility] ([facility])    [address]  [address]  [facility] [id number] [phone]                  Please advise re: Diflucan rx and refill on Hydroxyzine.    (Pt aware PCP out of office until 6-20-17.)

## 2017-06-16 ENCOUNTER — RADIANT APPOINTMENT (OUTPATIENT)
Dept: GENERAL RADIOLOGY | Facility: CLINIC | Age: 47
End: 2017-06-16
Attending: ORTHOPAEDIC SURGERY
Payer: MEDICARE

## 2017-06-16 ENCOUNTER — OFFICE VISIT (OUTPATIENT)
Dept: ORTHOPEDICS | Facility: CLINIC | Age: 47
End: 2017-06-16
Payer: MEDICARE

## 2017-06-16 VITALS
HEIGHT: 64 IN | BODY MASS INDEX: 31.41 KG/M2 | WEIGHT: 184 LBS | DIASTOLIC BLOOD PRESSURE: 64 MMHG | SYSTOLIC BLOOD PRESSURE: 100 MMHG

## 2017-06-16 DIAGNOSIS — M25.512 ACUTE PAIN OF LEFT SHOULDER: ICD-10-CM

## 2017-06-16 DIAGNOSIS — M54.12 CERVICAL RADICULOPATHY: Primary | ICD-10-CM

## 2017-06-16 DIAGNOSIS — M54.12 CERVICAL RADICULOPATHY: ICD-10-CM

## 2017-06-16 PROCEDURE — 72040 X-RAY EXAM NECK SPINE 2-3 VW: CPT

## 2017-06-16 PROCEDURE — 99214 OFFICE O/P EST MOD 30 MIN: CPT | Performed by: ORTHOPAEDIC SURGERY

## 2017-06-16 PROCEDURE — 73030 X-RAY EXAM OF SHOULDER: CPT | Mod: LT

## 2017-06-16 NOTE — PROGRESS NOTES
"HISTORY OF PRESENT ILLNESS:    Swetha Patterson is a 47 year old female who is seen as self referral for pain in the left shoulder and neck pain.    Present symptoms: Pt stats pain has been increasing over the last month.  Pt states she will get shooting and pulsating pain in the neck, on the left side, pt states moving her right arm will cause neck pain, quick movements will cause pain, states she can not sleep on her side.  Pt states left shoulder pain is at the AC joint, pain will go back into her neck, at times she will have pain going down her arm, states this is intermittent, feels she has \"bone sticking out\"  Treatments tried to this point: OTC Medication:  Ibuprofen (Advil)  Prescription Medication as directed:  oxycodone Topical Treatments: Ice, motion  Orthopedic PMH: Left shoulder w/ Dr Milian - 10/2015    Past Medical History:   Diagnosis Date     Anxiety state, unspecified      Arthritis     right hip arthritis     Asthma      Chronic pain     back pain from cyst     Contact dermatitis and other eczema, due to unspecified cause      COPD (chronic obstructive pulmonary disease) (H)      Depressive disorder, not elsewhere classified      Emphysema with chronic bronchitis (H)      Esophageal reflux      Family history of ischemic heart disease      Gastro-oesophageal reflux disease      HTN, goal below 140/90      Hyperlipidemia LDL goal <130      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)      PONV (postoperative nausea and vomiting)        Past Surgical History:   Procedure Laterality Date     ARTHROSCOPY SHOULDER DECOMPRESSION Left 10/21/2015    Procedure: ARTHROSCOPY SHOULDER DECOMPRESSION;  Surgeon: Julien Milian MD;  Location: [facility]     BRONCHIAL THERMOPLASTY N/A 11/14/2014    Procedure: BRONCHIAL THERMOPLASTY;  Surgeon: Ward Whitaker MD;  Location: [facility]     BRONCHIAL THERMOPLASTY N/A 12/19/2014    Procedure: BRONCHIAL THERMOPLASTY;  Surgeon: Ward Whitaker MD;  Location: [facility]     " BRONCHIAL THERMOPLASTY N/A 2/6/2015    Procedure: BRONCHIAL THERMOPLASTY;  Surgeon: Ward Whitaker MD;  Location: [facility]     C APPENDECTOMY  at age 17     EXCISE NODE MEDIASTINAL  4/26/2013    Procedure: EXCISE NODE MEDIASTINAL;;  Surgeon: Av Peña MD;  Location:  OR     THORACOSCOPY  4/26/2013    Procedure: THORACOSCOPY;  LEFT VIDEO ASSISTED THORACOSCOPY, RESECTION OF POSTERIOR MEDIASTINAL MASS;  Surgeon: Av Peña MD;  Location:  OR     TONSILLECTOMY  as a kid       Family History   Problem Relation Age of Onset     HEART DISEASE Mother      murmur, mi     Hypertension Mother      CEREBROVASCULAR DISEASE Mother      Depression Mother      Gallbladder Disease Mother      Family History Negative Father      does not know  him     HEART DISEASE Sister      murmur, hole in heart     Family History Negative Brother      HEART DISEASE Maternal Grandfather        Social History     Social History     Marital status: Single     Spouse name: N/A     Number of children: N/A     Years of education: N/A     Occupational History     Not on file.     Social History Main Topics     Smoking status: Former Smoker     Years: 30.00     Quit date: 9/1/2015     Smokeless tobacco: Never Used      Comment: . started Chantix 8/9/15     Alcohol use No     Drug use: No     Sexual activity: Yes     Partners: Male     Other Topics Concern     Caffeine Concern No     4-5 cans of pop daily     Special Diet No     Exercise No     on hold     Social History Narrative       Current Outpatient Prescriptions   Medication Sig Dispense Refill     oxyCODONE (ROXICODONE) 10 MG IR tablet 5 times daily as needed 150 tablet 0     ondansetron (ZOFRAN) 8 MG tablet Take 0.5-1 tablets (4-8 mg) by mouth every 8 hours as needed for nausea 30 tablet 1     atorvastatin (LIPITOR) 80 MG tablet Take 1 tablet (80 mg) by mouth daily 90 tablet 1     amLODIPine (NORVASC) 10 MG tablet Take 1 tablet (10 mg) by mouth daily 90  tablet 1     lisinopril (PRINIVIL/ZESTRIL) 20 MG tablet Take 1 tablet (20 mg) by mouth daily 90 tablet 1     budesonide-formoterol (SYMBICORT) 160-4.5 MCG/ACT Inhaler Inhale 2 puffs into the lungs 2 times daily 3 Inhaler 3     ipratropium - albuterol 0.5 mg/2.5 mg/3 mL (DUONEB) 0.5-2.5 (3) MG/3ML neb solution Take 1 vial (3 mLs) by nebulization every 6 hours 360 mL 0     albuterol (VENTOLIN HFA) 108 (90 BASE) MCG/ACT Inhaler Inhale 2 puffs into the lungs every 6 hours 3 Inhaler 3     desonide (DESOWEN) 0.05 % cream Apply sparingly to affected area three times daily as needed. 60 g 0     pantoprazole (PROTONIX) 20 MG tablet Take 2 tablets (40 mg) by mouth every morning (before breakfast) 90 tablet 3     clindamycin-benzoyl peroxide (BENZACLIN) gel Apply topically 2 times daily 100 g 3     VITAMIN D, CHOLECALCIFEROL, PO Take 2,000 Units by mouth daily       hydrOXYzine (ATARAX) 25 MG tablet Take 1 tablet (25 mg) by mouth 3 times daily For anxiety (Patient not taking: Reported on 6/16/2017) 120 tablet 1     doxycycline Monohydrate 100 MG TABS One pill by mouth twice daily (Patient not taking: Reported on 6/16/2017) 42 tablet 0     diazepam (VALIUM) 10 MG tablet Take 1 tablet (10 mg) by mouth every 8 hours as needed for anxiety or sleep For sleep, anxiety, and muscle tension. (Patient not taking: Reported on 6/16/2017) 120 tablet 0     fluticasone (FLONASE) 50 MCG/ACT spray Spray 2 sprays into both nostrils daily (Patient not taking: Reported on 6/16/2017) 16 g 11     ranitidine (ZANTAC) 75 MG tablet Take 2 tablets (150 mg) by mouth daily (Patient not taking: Reported on 6/16/2017) 90 tablet 1     cetirizine HCl 10 MG CAPS Take 1 capsule by mouth daily (Patient not taking: Reported on 6/16/2017) 90 capsule 3     escitalopram (LEXAPRO) 10 MG tablet Take 1 tablet (10 mg) by mouth daily (Patient not taking: Reported on 6/16/2017) 90 tablet 1     irbesartan (AVAPRO) 300 MG tablet Take 1 tablet (300 mg) by mouth daily  (Patient not taking: Reported on 6/16/2017) 90 tablet 1     montelukast (SINGULAIR) 10 MG tablet Take 1 tablet (10 mg) by mouth At Bedtime (Patient not taking: Reported on 6/16/2017) 30 tablet 1     furosemide (LASIX) 20 MG tablet Take 1 tablet (20 mg) by mouth daily as needed (Patient not taking: Reported on 6/16/2017) 90 tablet 1     NICORETTE 2 MG lozenge PLACE 1 LOZENGE INSIDE CHEEK Q HOUR PRF SMOKING CESSATION UTD  1     tiotropium (SPIRIVA HANDIHALER) 18 MCG capsule Inhale contents of one capsule daily. (Patient not taking: Reported on 6/16/2017) 30 capsule 1     ketoconazole (NIZORAL) 2 % shampoo Apply to the affected area and wash off after 5 minutes. (Patient not taking: Reported on 6/16/2017) 120 mL 1     prochlorperazine (COMPAZINE) 5 MG tablet Take 1 tablet (5 mg) by mouth every 6 hours as needed for nausea or vomiting (Patient not taking: Reported on 6/16/2017) 90 tablet 0     order for DME Equipment being ordered: Nebulizer machine  Length of need-lifetime 1 Device 0     chlorhexidine (HIBICLENS) 4 % external liquid Wash ab, groin, thighs daily in shower DO NOT PUT ON FACE (Patient not taking: Reported on 6/16/2017) 946 mL 3       Allergies   Allergen Reactions     Codeine      vomiting     Hydrocodone Nausea and Vomiting     Sulfa Drugs      Nausea     Gabapentin Rash       REVIEW OF SYSTEMS:  CONSTITUTIONAL:  NEGATIVE for fever, chills, change in weight  INTEGUMENTARY/SKIN:  NEGATIVE for worrisome rashes, moles or lesions  EYES:  NEGATIVE for vision changes or irritation  ENT/MOUTH:  NEGATIVE for ear, mouth and throat problems  RESP:  NEGATIVE for significant cough or SOB  BREAST:  NEGATIVE for masses, tenderness or discharge  CV:  NEGATIVE for chest pain, palpitations or peripheral edema  GI:  NEGATIVE for nausea, abdominal pain, heartburn, or change in bowel habits  :  Negative   MUSCULOSKELETAL:  See HPI above  NEURO:  NEGATIVE for weakness, dizziness or paresthesias  ENDOCRINE:  NEGATIVE for  "temperature intolerance, skin/hair changes  HEME/ALLERGY/IMMUNE:  NEGATIVE for bleeding problems  PSYCHIATRIC:  NEGATIVE for changes in mood or affect      PHYSICAL EXAM:  /64 (BP Location: Right arm, Patient Position: Chair, Cuff Size: Adult Regular)  Ht 5' 4\" (1.626 m)  Wt 184 lb (83.5 kg)  BMI 31.58 kg/m2  Body mass index is 31.58 kg/(m^2).   GENERAL APPEARANCE: healthy, alert and no distress   SKIN: no suspicious lesions or rashes  NEURO: Normal strength and tone, mentation intact and speech normal  VASCULAR: Good pulses, and capillary refill   LYMPH: no lymphadenopathy   PSYCH:  mentation appears normal and affect normal/bright    MSK:  Examination of the neck and shoulder girdle musculature reveals no asymmetry, or atrophy to the muscle masses.  There is no erythema, ecchymosis or edema.  There is a normal lordosis to the cervical and lumbar spine regions.  There is a normal kyphosis to the thoracic spine.  There is No clinical evidence of scoliosis. There is no tenderness to palpation or percussion.  There is no paraspinal muscle spasm present.  There is a Normal range of motion to the cervical spine. Forward flexion to 45, extension to 55, R and L lateral bending to 45, and rotation to 70, both R and L.    Spurling's sign : negative.   Shoulder abduction relief : negative.   Cruz's sign : negative    Strength : Bicep 5/5   C5-6       Sensation :     Deltoid 5/5   C5    Lateral Arm    Wrist extensors 5/5  C6    Thumb    Tricep  5/5   C7    Middle Finger    Finger flexors  5/5  C8    Little Finger    Interossei  5/5   T1    Medial Arm    Reflexes :  Bicep   Symmetric  C5-6    BR Symmetric  C6    Tricep Symmetric  C7    Two Point Discrimination   5mm and Symmetric    Examination of the shoulders, elbows and wrists reveal full painless range of motion.  Pulses are palpable and fingers are pink and warm.           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 I " ordered an MRI of the cervical spine. She'll return after that exam.    Imaging Interpretation:         Lui Benson MD  Department of Orthopedic Surgery      Disclaimer: This note consists of symbols derived from keyboarding, dictation and/or voice recognition software. As a result, there may be errors in the script that have gone undetected. Please consider this when interpreting information found in this chart.

## 2017-06-16 NOTE — PATIENT INSTRUCTIONS
Please call [facility] [phone] ([address] [facility], [address], [place], [place]) to schedule your appointment if you have not heard from them in two business days    Please follow up in clinic 2 business days following the MRI. Clinic phone number is [phone] to schedule your appointment.

## 2017-06-16 NOTE — MR AVS SNAPSHOT
After Visit Summary   6/16/2017    Swetha Patterson    MRN: [id number]           Patient Information     Date Of Birth          1970        Visit Information        Provider Department      6/16/2017 3:40 PM Jose Francisco Benson MD [facility] ORTHOPEDIC SURGERY        Today's Diagnoses     Cervical radiculopathy    -  1    Acute pain of left shoulder          Care Instructions    Please call [facility] [phone] ([address] [facility] [address], [address], [place], [place]) to schedule your appointment if you have not heard from them in two business days    Please follow up in clinic 2 business days following the MRI. Clinic phone number is [phone] to schedule your appointment.                Follow-ups after your visit        Your next 10 appointments already scheduled     Jun 28, 2017  2:00 PM CDT   Office Visit with Ela Alvarez MD   [facility] ([facility])    [address] [place] [id number] [phone]           Bring a current list of meds and any records pertaining to this visit.  For Physicals, please bring immunization records and any forms needing to be filled out.  Please arrive 10 minutes early to complete paperwork.            Jun 29, 2017  8:45 AM CDT   Return Visit with Milena Joseph NP   [facility] ([facility])    [address]  [address]  [facility] [id number] [phone]            Aug 18, 2017 10:00 AM CDT   (Arrive by 9:30 AM)   NEW THROAT with Lis Talbert MD   [facility] ([facility] [place])    [address] [place] [id number] [phone]           - This is a multidisciplinary care team visit with an ENT physician and may include a speech language pathologist. - It is important to know that if you are evaluated and/or treated by both a physician and a speech  pathologist during your visit, your billing will reflect the input that you receive from both providers as separate professionals. Although most insurance plans do cover these services, we encourage you to contact your insurance company prior to your visit to determine whether there are any coverage limitations that might affect you financially. - Billing/procedure codes that are frequently associated with visits to our clinic include (but are not limited to) the ones listed below. Most patients will not need all of these items, but it may be useful to ask your insurance company's patient . 43727: Flexible fiberoptic laryngoscopy, 97676: Laryngoscopy; flexible or rigid fiberoptic, with stroboscopy, 27719: Flexible endoscopic evaluation of swallowing, 83450: Evaluation of Voice and Resonance, 26429: Speech pathology treatment for voice, speech, communication, 74496: Speech pathology treatment for swallowing/oral function for feeding - If you have any concerns or questions, or if you would prefer not to have a speech pathologist involved in your visit, please contact our Clinic Coordinator at [phone], at least 24 hours prior to your appointment.              Who to contact     If you have questions or need follow up information about today's clinic visit or your schedule please contact [facility] ORTHOPEDIC SURGERY directly at [phone].  Normal or non-critical lab and imaging results will be communicated to you by [employer], letter or phone within 4 business days after the clinic has received the results. If you do not hear from us within 7 days, please contact the clinic through [employer] or phone. If you have a critical or abnormal lab result, we will notify you by phone as soon as possible.  Submit refill requests through [employer] or call your pharmacy and they will forward the refill request to us. Please allow 3 business days for your refill to be completed.          Additional  "Information About Your Visit        MyChart Information     [employer] lets you send messages to your doctor, view your test results, renew your prescriptions, schedule appointments and more. To sign up, go to [email or web address] . Click on \"Log in\" on the left side of the screen, which will take you to the Welcome page. Then click on \"Sign up Now\" on the right side of the page.     You will be asked to enter the access code listed below, as well as some personal information. Please follow the directions to create your username and password.     Your access code is: [email or web address]  Expires: 2017  6:42 PM     Your access code will  in 90 days. If you need help or a new code, please call your [place] clinic or [phone].        Care EveryWhere ID     This is your Care EveryWhere ID. This could be used by other organizations to access your [place] medical records  [id number]        Your Vitals Were     Height BMI (Body Mass Index)                5' 4\" (1.626 m) 31.58 kg/m2           Blood Pressure from Last 3 Encounters:   17 100/64   17 128/80   17 110/74    Weight from Last 3 Encounters:   17 184 lb (83.5 kg)   17 178 lb (80.7 kg)   17 173 lb 14.4 oz (78.9 kg)               Primary Care Provider Office Phone # Fax #    Roro Janine Chawla [phone]       [facility] [address] [place] [address]        Thank you!     Thank you for choosing [facility] ORTHOPEDIC SURGERY  for your care. Our goal is always to provide you with excellent care. Hearing back from our patients is one way we can continue to improve our services. Please take a few minutes to complete the written survey that you may receive in the mail after your visit with us. Thank you!             Your Updated Medication List - Protect others around you: Learn how to safely use, store and throw away your medicines at www.disposemymeds.org.        "   This list is accurate as of: 6/16/17  4:30 PM.  Always use your most recent med list.                   Brand Name Dispense Instructions for use    albuterol 108 (90 BASE) MCG/ACT Inhaler    VENTOLIN HFA    3 Inhaler    Inhale 2 puffs into the lungs every 6 hours       amLODIPine 10 MG tablet    NORVASC    90 tablet    Take 1 tablet (10 mg) by mouth daily       atorvastatin 80 MG tablet    LIPITOR    90 tablet    Take 1 tablet (80 mg) by mouth daily       budesonide-formoterol 160-4.5 MCG/ACT Inhaler    SYMBICORT    3 Inhaler    Inhale 2 puffs into the lungs 2 times daily       cetirizine HCl 10 MG Caps     90 capsule    Take 1 capsule by mouth daily       chlorhexidine 4 % liquid    HIBICLENS    946 mL    Wash ab, groin, thighs daily in shower DO NOT PUT ON FACE       clindamycin-benzoyl peroxide gel    BENZACLIN    100 g    Apply topically 2 times daily       desonide 0.05 % cream    DESOWEN    60 g    Apply sparingly to affected area three times daily as needed.       diazepam 10 MG tablet    VALIUM    120 tablet    Take 1 tablet (10 mg) by mouth every 8 hours as needed for anxiety or sleep For sleep, anxiety, and muscle tension.       doxycycline Monohydrate 100 MG Tabs     42 tablet    One pill by mouth twice daily       escitalopram 10 MG tablet    LEXAPRO    90 tablet    Take 1 tablet (10 mg) by mouth daily       fluticasone 50 MCG/ACT spray    FLONASE    16 g    Spray 2 sprays into both nostrils daily       furosemide 20 MG tablet    LASIX    90 tablet    Take 1 tablet (20 mg) by mouth daily as needed       hydrOXYzine 25 MG tablet    ATARAX    120 tablet    Take 1 tablet (25 mg) by mouth 3 times daily For anxiety       ipratropium - albuterol 0.5 mg/2.5 mg/3 mL 0.5-2.5 (3) MG/3ML neb solution    DUONEB    360 mL    Take 1 vial (3 mLs) by nebulization every 6 hours       irbesartan 300 MG tablet    AVAPRO    90 tablet    Take 1 tablet (300 mg) by mouth daily       ketoconazole 2 % shampoo    NIZORAL    120  mL    Apply to the affected area and wash off after 5 minutes.       lisinopril 20 MG tablet    PRINIVIL/ZESTRIL    90 tablet    Take 1 tablet (20 mg) by mouth daily       montelukast 10 MG tablet    SINGULAIR    30 tablet    Take 1 tablet (10 mg) by mouth At Bedtime       NICORETTE 2 MG lozenge   Generic drug:  nicotine polacrilex      PLACE 1 LOZENGE INSIDE CHEEK Q HOUR PRF SMOKING CESSATION UTD       ondansetron 8 MG tablet    ZOFRAN    30 tablet    Take 0.5-1 tablets (4-8 mg) by mouth every 8 hours as needed for nausea       order for DME     1 Device    Equipment being ordered: Nebulizer machine Length of need-lifetime       oxyCODONE 10 MG IR tablet    ROXICODONE    150 tablet    5 times daily as needed       pantoprazole 20 MG EC tablet    PROTONIX    90 tablet    Take 2 tablets (40 mg) by mouth every morning (before breakfast)       prochlorperazine 5 MG tablet    COMPAZINE    90 tablet    Take 1 tablet (5 mg) by mouth every 6 hours as needed for nausea or vomiting       ranitidine 75 MG tablet    ZANTAC    90 tablet    Take 2 tablets (150 mg) by mouth daily       tiotropium 18 MCG capsule    SPIRIVA HANDIHALER    30 capsule    Inhale contents of one capsule daily.       VITAMIN D (CHOLECALCIFEROL) PO      Take 2,000 Units by mouth daily

## 2017-06-16 NOTE — NURSING NOTE
"Chief Complaint   Patient presents with     Shoulder Pain     left side neck and shoulder pain       Initial /64 (BP Location: Right arm, Patient Position: Chair, Cuff Size: Adult Regular)  Ht 5' 4\" (1.626 m)  Wt 184 lb (83.5 kg)  BMI 31.58 kg/m2 Estimated body mass index is 31.58 kg/(m^2) as calculated from the following:    Height as of this encounter: 5' 4\" (1.626 m).    Weight as of this encounter: 184 lb (83.5 kg).  Medication Reconciliation: complete    "

## 2017-06-18 ENCOUNTER — HOSPITAL ENCOUNTER (EMERGENCY)
Facility: CLINIC | Age: 47
Discharge: HOME | End: 2017-06-18
Attending: EMERGENCY MEDICINE | Admitting: EMERGENCY MEDICINE
Payer: MEDICARE

## 2017-06-18 VITALS
TEMPERATURE: 97.3 F | RESPIRATION RATE: 16 BRPM | SYSTOLIC BLOOD PRESSURE: 113 MMHG | OXYGEN SATURATION: 97 % | DIASTOLIC BLOOD PRESSURE: 75 MMHG

## 2017-06-18 DIAGNOSIS — M54.2 CERVICALGIA: ICD-10-CM

## 2017-06-18 PROCEDURE — 25000132 ZZH RX MED GY IP 250 OP 250 PS 637: Mod: GY | Performed by: EMERGENCY MEDICINE

## 2017-06-18 PROCEDURE — 96374 THER/PROPH/DIAG INJ IV PUSH: CPT

## 2017-06-18 PROCEDURE — 96375 TX/PRO/DX INJ NEW DRUG ADDON: CPT

## 2017-06-18 PROCEDURE — 25000128 H RX IP 250 OP 636: Performed by: EMERGENCY MEDICINE

## 2017-06-18 PROCEDURE — A9270 NON-COVERED ITEM OR SERVICE: HCPCS | Mod: GY | Performed by: EMERGENCY MEDICINE

## 2017-06-18 PROCEDURE — 96376 TX/PRO/DX INJ SAME DRUG ADON: CPT

## 2017-06-18 PROCEDURE — 99285 EMERGENCY DEPT VISIT HI MDM: CPT | Mod: 25

## 2017-06-18 RX ORDER — OXYCODONE HYDROCHLORIDE 5 MG/1
10 TABLET ORAL ONCE
Status: COMPLETED | OUTPATIENT
Start: 2017-06-18 | End: 2017-06-18

## 2017-06-18 RX ORDER — KETOROLAC TROMETHAMINE 30 MG/ML
30 INJECTION, SOLUTION INTRAMUSCULAR; INTRAVENOUS ONCE
Status: COMPLETED | OUTPATIENT
Start: 2017-06-18 | End: 2017-06-18

## 2017-06-18 RX ORDER — DIAZEPAM 10 MG/2ML
5 INJECTION, SOLUTION INTRAMUSCULAR; INTRAVENOUS ONCE
Status: COMPLETED | OUTPATIENT
Start: 2017-06-18 | End: 2017-06-18

## 2017-06-18 RX ORDER — HYDROMORPHONE HYDROCHLORIDE 1 MG/ML
0.5 INJECTION, SOLUTION INTRAMUSCULAR; INTRAVENOUS; SUBCUTANEOUS ONCE
Status: COMPLETED | OUTPATIENT
Start: 2017-06-18 | End: 2017-06-18

## 2017-06-18 RX ORDER — METHYLPREDNISOLONE 4 MG
TABLET, DOSE PACK ORAL
Qty: 21 TABLET | Refills: 0 | Status: SHIPPED | OUTPATIENT
Start: 2017-06-18 | End: 2017-06-28

## 2017-06-18 RX ORDER — LIDOCAINE 40 MG/G
CREAM TOPICAL
Status: DISCONTINUED | OUTPATIENT
Start: 2017-06-18 | End: 2017-06-18 | Stop reason: HOSPADM

## 2017-06-18 RX ORDER — HYDROMORPHONE HYDROCHLORIDE 1 MG/ML
0.5 INJECTION, SOLUTION INTRAMUSCULAR; INTRAVENOUS; SUBCUTANEOUS
Status: COMPLETED | OUTPATIENT
Start: 2017-06-18 | End: 2017-06-18

## 2017-06-18 RX ADMIN — Medication 0.5 MG: at 16:01

## 2017-06-18 RX ADMIN — OXYCODONE HYDROCHLORIDE 10 MG: 5 TABLET ORAL at 17:25

## 2017-06-18 RX ADMIN — KETOROLAC TROMETHAMINE 30 MG: 30 INJECTION, SOLUTION INTRAMUSCULAR at 13:42

## 2017-06-18 RX ADMIN — HYDROMORPHONE HYDROCHLORIDE 0.5 MG: 1 INJECTION, SOLUTION INTRAMUSCULAR; INTRAVENOUS; SUBCUTANEOUS at 14:38

## 2017-06-18 RX ADMIN — HYDROMORPHONE HYDROCHLORIDE 0.5 MG: 1 INJECTION, SOLUTION INTRAMUSCULAR; INTRAVENOUS; SUBCUTANEOUS at 15:42

## 2017-06-18 RX ADMIN — DIAZEPAM 5 MG: 5 INJECTION, SOLUTION INTRAMUSCULAR; INTRAVENOUS at 13:42

## 2017-06-18 RX ADMIN — HYDROMORPHONE HYDROCHLORIDE 0.5 MG: 1 INJECTION, SOLUTION INTRAMUSCULAR; INTRAVENOUS; SUBCUTANEOUS at 13:43

## 2017-06-18 ASSESSMENT — ENCOUNTER SYMPTOMS
NAUSEA: 0
FEVER: 0
NECK PAIN: 1
ABDOMINAL PAIN: 0
VOMITING: 0
DIAPHORESIS: 0
CHILLS: 0

## 2017-06-18 NOTE — ED PROVIDER NOTES
"  History     Chief Complaint:  Neck Pain    HPI   Swetha Patterson is a 47 year old female with lung disease who presents to the emergency department for evaluation of neck pain.  She states that left unilateral neck pain began 3 weeks ago and is progressively getting worse and radiating outward.  She states \"unbearable pulsating muscle spasms\" of her left neck that radiate down her left arm, anterior chest and now center back.  She states that prescribed Oxycodone does not alleviate the pain.  She did see orthopedics recently and believes that she is going to have surgery soon.    CERVICAL SPINE TWO TO THREE VIEWS - June 16, 2017 4:27 PM   HISTORY: Radiculopathy, cervical region.  COMPARISON: None.    FINDINGS: There is normal alignment of the cervical vertebrae;  however, there is straightening of normal cervical lordosis. Vertebral  body heights of the cervical spine are normal. Craniocervical  alignment is normal. There are no fractures of the cervical spine.  There is mild degenerative endplate spurring at the C5-C6 level. There  is no prevertebral soft tissue swelling.      IMPRESSION: Mild degenerative changes of the mid cervical spine. No  evidence for fracture or traumatic malalignment.    IRENE GREEN MD    SHOULDER LEFT THREE OR MORE VIEWS - June 16, 2017 4:27 PM   HISTORY: Pain in left shoulder.  COMPARISON: None.     FINDINGS: The visualized bones and joint spaces are within normal  limits.     IMPRESSION: No evidence for fracture, dislocation or significant  degenerative change of the left shoulder.     IRENE GREEN MD    Allergies:  Hydrocodone  Sulfa Drugs  Gabapentin  Codeine    Medications:    Atarax  Valium  Roxicodone  Zofran  Flonase  Zantac  Lipitor  Lexapro  Norvasc  Prinivil/Zestril  Avapro  Singulair  Lasix  Nicorette  Symbicort  Duoneb  Spiriva  Ventolin  Nizoral  Desowen  Protonix  Compazine  Benzaclin  Hibiclens    Past Medical History:    Anxiety  Arthritis  Asthma  Contact " Dermatitis and other Eczema  COPD  Depression  Emphysema with Chronic Bronchitis  Esophageal Reflux  GERD  HTN  Hyperlipidemia  Polyp of Vocal Cord or Larynx  Mediastinal Cyst  DIAZ    Past Surgical History:    Bronchial Thermoplasty  Appendectomy  Excise Node Mediastinal  Tonsillectomy    Family History:    Mother: Heart Murmur, Hypertension, Cerebrovascular Disease, Depression, Gallbladder Disease    Social History:  Former Smoker  Denies Alcohol Use  Marital Status:  Single [1]    Review of Systems   Constitutional: Negative for chills, diaphoresis and fever.   Gastrointestinal: Negative for abdominal pain, nausea and vomiting.   Musculoskeletal: Positive for neck pain.        Muscle spasms - left neck with radiation     Physical Exam     Patient Vitals for the past 24 hrs:   BP Temp Temp src Heart Rate Resp SpO2   06/18/17 1715 113/75 - - - - 97 %   06/18/17 1700 (!) 132/93 - - - - 98 %   06/18/17 1257 (!) 135/98 97.3  F (36.3  C) Oral 90 16 97 %        Physical Exam    General: Patient is alert and cooperative.  Obviously uncomfortable, has a couple ice packs on posterior neck.  HENT: no facial swelling, no asymmetry.   Eyes: EOMI, PERRLA.  Normal conjunctiva.  Neck: Normal appearance.  No swelling, overlying skin changes, or palpable abnormality.  Limited ROM, prefers neutral position.  Diffuse tenderness posterior and left posterolateral/trapezius regions.   Cardiovascular: Normal rate, regular rhythm.  Pulmonary/Chest: Effort normal.   Abdominal: Soft. There is no tenderness.       Musculoskeletal: Normal range of motion of extremities. Limited movement neck and upper back.  Neurological: Patient  is alert and oriented. Coordination normal, symmetric strength x 4 extremities, intact sensation also.  Skin: Skin is warm and dry. No rash noted.   Psychiatric: Patient has a normal mood and affect. Normal behavior and judgement.    Emergency Department Course     Interventions:  13:42 Valium 5 mg IV  13:42  Toradol 30 mg IV  13:43 Dilaudid 0.5 mg IV  14:38 Dilaudid 0.5 mg IV  15:42 Dilaudid 0.5 mg IV  16:01 Dilaudid 0.5 mg IV  17:52 Roxicodone 10 mg PO    Emergency Department Course:  Nursing notes and vitals reviewed.  I performed an exam of the patient as documented above.  IV inserted. Medicine administered as documented above.   15:30 I reevaluated the patient and provided an update in regards to her ED course.    More IV medication were administered.  Findings and plan explained to the Patient. Patient discharged home with instructions regarding supportive care, medications, and reasons to return. The importance of close follow-up was reviewed. The patient was prescribed Zanaflex and Medrol Dosepak    Impression & Plan      Medical Decision Making:  Swetha Patterson is a 47 year old afebrile female who presents with complaints of worsening neck pain.  Onset was about 3 weeks ago of left posterolateral neck discomfort with some occasional radiation into her back and upper extremities.  She does have a history of chronic pain related apparently to some lung disease and does take Oxycodone at home for that.  She has not had any associated motor weakness of her extremities.  On arrival here she is quite uncomfortable but has intact motor and sensory exams.  An IV was established and she was fairly aggressively medicated with IV Dilaudid, Toradol and Valium.  Symptoms have improved a bit.  She remains uncomfortable but now feels that she can continue to work on this as an outpatient.  She is actually scheduled for an MRI of the cervical spine tomorrow and to follow up with the ordering orthopedist later in the week.  I don't believe an emergency MRI is necessary despite her increasing pain given her unremarkable neurologic exam.  She has Oxycodone at home and will be receiving prescriptions for Zanaflex and Medrol Dosepak to supplement that and over-the-counter Ibuprofen.    Diagnosis:    ICD-10-CM    1. Cervicalgia  M54.2      Disposition:  discharged to home    Discharge Medications:  Discharge Medication List as of 6/18/2017  5:21 PM      START taking these medications    Details   tiZANidine (ZANAFLEX) 4 MG tablet Take 1 tablet (4 mg) by mouth 3 times daily as needed for muscle spasms (MUSCLE SPASM), Disp-20 tablet, R-0, Local Print      methylPREDNISolone (MEDROL DOSEPAK) 4 MG tablet Follow package instructions, Disp-21 tablet, R-0, Local Print             IKoffi, am serving as a scribe on 6/18/2017 at 1:21 PM to personally document services performed by Matt Escobar MD based on my observations and the provider's statements to me.     6/18/2017   [facility] EMERGENCY DEPARTMENT       Matt Escobar MD  06/18/17 7195

## 2017-06-18 NOTE — ED AVS SNAPSHOT
[facility] Emergency [facility]    [address]    [facility] [address]    Phone:  [phone]    Fax:  [phone]                                       Swetha Patterson   MRN: [id number]    Department:  [facility] Emergency Department   Date of Visit:  6/18/2017           Patient Information     Date Of Birth          1970        Your diagnoses for this visit were:     Cervicalgia        You were seen by Matt Escobar MD.      Follow-up Information     Follow up with Your doctor.    Why:  for re-evaluation of your symptoms this week        Discharge Instructions         Understanding Cervical Radiculopathy    Cervical radiculopathy is irritation or inflammation of a nerve root in the neck. It causes neck pain and other symptoms that may spread into the chest or down the arm. To understand this condition, it helps to understand the parts of the spine:    Vertebrae. These are bones that stack to form the spine. The cervical spine contains the 7 vertebrae in the neck.    Disks. These are soft pads of tissue between the vertebrae. They act as shock absorbers for the spine.    The spinal canal. This is a tunnel formed within the stacked vertebrae. The spinal cord runs through this canal.    Nerves. These branch off the spinal cord. As they leave the spinal canal, nerves pass through openings between the vertebrae. The nerve root is the part of the nerve that is closest to the spinal cord.   With cervical radiculopathy, nerve roots in the neck become irritated. This leads to pain and symptoms that can travel to the nerves that go from the spinal cord down the arms and into the torso.  What causes cervical radiculopathy?  Aging, injury, poor posture, and other issues can lead to problems in the neck. These problems may then irritate nerve roots. These include:    Damage to a disk in the cervical spine. The damaged disk may then press on nearby nerve  roots.    Degeneration from wear and tear, and aging. This can lead to narrowing (stenosis) of the openings between the vertebrae. The narrowed openings press on nerve roots as they leave the spinal canal.    An unstable spine. This is when a vertebra slips forward. It can then press on a nerve root.  There are other, less common causes of pressure on nerves in the neck. These include infection, cysts, and tumors.  Symptoms of cervical radiculopathy  These include:    Neck pain    Pain, numbness, tingling, or weakness that travels down the arm    Loss of neck movement    Muscle spasms  Treatment for cervical radiculopathy  In most cases, your healthcare provider will first try treatments that help relieve symptoms. These may include:    Prescription or over-the-counter pain medicines. These help relieve pain and swelling.    Cold packs. These help reduce pain.    Resting. This involves avoiding positions and activities that increase pain.    Neck brace (cervical collar). This can help relieve inflammation and pain.    Physical therapy, including exercises and stretches. This can help decrease pain and increase movement and function.    Shots of medicinesaround the nerve roots. This is done to help relieve symptoms for a time.  In some cases, your healthcare provider may advise surgery to fix the underlying problem. This depends on the cause, the symptoms, and how long the pain has lasted.  Possible complications  Over time, an irritated and inflamed nerve may become damaged. This may lead to long-lasting (permanent) numbness or weakness. If symptoms change suddenly or get worse, be sure to let your healthcare provider know.     When to call your healthcare provider  Call your healthcare provider right away if you have any of these:    New pain or pain that gets worse    New or increasing weakness, numbness, or tingling in your arm or hand    Bowel or bladder changes   Date Last Reviewed: 3/10/2016    1397-9682 [employer]. [address] [place] [address]. All rights reserved. This information is not intended as a substitute for professional medical care. Always follow your healthcare professional's instructions.          Future Appointments        Provider Department Dept Phone Center    6/28/2017 2:00 PM Ela Alvarez MD [facility] [phone] [place]    6/29/2017 8:45 AM Milena Joseph NP [facility] [phone] [place]    8/18/2017 10:00 AM Lis Talbert MD [facility] [phone] [facility]      24 Hour Appointment Hotline       To make an appointment at any [facility], call [phone] ([phone]). If you don't have a family doctor or clinic, we will help you find one. [facility] are conveniently located to serve the needs of you and your family.             Review of your medicines      START taking        Dose / Directions Last dose taken    methylPREDNISolone 4 MG tablet   Commonly known as:  MEDROL DOSEPAK   Quantity:  21 tablet        Follow package instructions   Refills:  0        tiZANidine 4 MG tablet   Commonly known as:  ZANAFLEX   Dose:  4 mg   Quantity:  20 tablet        Take 1 tablet (4 mg) by mouth 3 times daily as needed for muscle spasms (MUSCLE SPASM)   Refills:  0          Our records show that you are taking the medicines listed below. If these are incorrect, please call your family doctor or clinic.        Dose / Directions Last dose taken    albuterol 108 (90 BASE) MCG/ACT Inhaler   Commonly known as:  VENTOLIN HFA   Dose:  2 puff   Quantity:  3 Inhaler        Inhale 2 puffs into the lungs every 6 hours   Refills:  3        amLODIPine 10 MG tablet   Commonly known as:  NORVASC   Dose:  10 mg   Quantity:  90 tablet        Take 1 tablet (10 mg) by mouth daily   Refills:  1        atorvastatin 80 MG tablet   Commonly known as:  LIPITOR   Dose:  80 mg   Quantity:  90 tablet        Take 1 tablet (80 mg) by  mouth daily   Refills:  1        budesonide-formoterol 160-4.5 MCG/ACT Inhaler   Commonly known as:  SYMBICORT   Dose:  2 puff   Quantity:  3 Inhaler        Inhale 2 puffs into the lungs 2 times daily   Refills:  3        cetirizine HCl 10 MG Caps   Dose:  1 capsule   Quantity:  90 capsule        Take 1 capsule by mouth daily   Refills:  3        chlorhexidine 4 % liquid   Commonly known as:  HIBICLENS   Quantity:  946 mL        Wash ab, groin, thighs daily in shower DO NOT PUT ON FACE   Refills:  3        clindamycin-benzoyl peroxide gel   Commonly known as:  BENZACLIN   Quantity:  100 g        Apply topically 2 times daily   Refills:  3        desonide 0.05 % cream   Commonly known as:  DESOWEN   Quantity:  60 g        Apply sparingly to affected area three times daily as needed.   Refills:  0        diazepam 10 MG tablet   Commonly known as:  VALIUM   Dose:  10 mg   Quantity:  120 tablet        Take 1 tablet (10 mg) by mouth every 8 hours as needed for anxiety or sleep For sleep, anxiety, and muscle tension.   Refills:  0        doxycycline Monohydrate 100 MG Tabs   Quantity:  42 tablet        One pill by mouth twice daily   Refills:  0        escitalopram 10 MG tablet   Commonly known as:  LEXAPRO   Dose:  10 mg   Quantity:  90 tablet        Take 1 tablet (10 mg) by mouth daily   Refills:  1        fluticasone 50 MCG/ACT spray   Commonly known as:  FLONASE   Dose:  2 spray   Quantity:  16 g        Spray 2 sprays into both nostrils daily   Refills:  11        furosemide 20 MG tablet   Commonly known as:  LASIX   Dose:  20 mg   Quantity:  90 tablet        Take 1 tablet (20 mg) by mouth daily as needed   Refills:  1        hydrOXYzine 25 MG tablet   Commonly known as:  ATARAX   Dose:  25 mg   Quantity:  120 tablet        Take 1 tablet (25 mg) by mouth 3 times daily For anxiety   Refills:  1        ipratropium - albuterol 0.5 mg/2.5 mg/3 mL 0.5-2.5 (3) MG/3ML neb solution   Commonly known as:  DUONEB   Dose:  1 vial    Quantity:  360 mL        Take 1 vial (3 mLs) by nebulization every 6 hours   Refills:  0        irbesartan 300 MG tablet   Commonly known as:  AVAPRO   Dose:  300 mg   Quantity:  90 tablet        Take 1 tablet (300 mg) by mouth daily   Refills:  1        ketoconazole 2 % shampoo   Commonly known as:  NIZORAL   Quantity:  120 mL        Apply to the affected area and wash off after 5 minutes.   Refills:  1        lisinopril 20 MG tablet   Commonly known as:  PRINIVIL/ZESTRIL   Dose:  20 mg   Quantity:  90 tablet        Take 1 tablet (20 mg) by mouth daily   Refills:  1        montelukast 10 MG tablet   Commonly known as:  SINGULAIR   Dose:  10 mg   Quantity:  30 tablet        Take 1 tablet (10 mg) by mouth At Bedtime   Refills:  1        NICORETTE 2 MG lozenge   Generic drug:  nicotine polacrilex        PLACE 1 LOZENGE INSIDE CHEEK Q HOUR PRF SMOKING CESSATION UTD   Refills:  1        ondansetron 8 MG tablet   Commonly known as:  ZOFRAN   Dose:  4-8 mg   Quantity:  30 tablet        Take 0.5-1 tablets (4-8 mg) by mouth every 8 hours as needed for nausea   Refills:  1        order for DME   Quantity:  1 Device        Equipment being ordered: Nebulizer machine Length of need-lifetime   Refills:  0        oxyCODONE 10 MG IR tablet   Commonly known as:  ROXICODONE   Quantity:  150 tablet        5 times daily as needed   Refills:  0        pantoprazole 20 MG EC tablet   Commonly known as:  PROTONIX   Dose:  40 mg   Quantity:  90 tablet        Take 2 tablets (40 mg) by mouth every morning (before breakfast)   Refills:  3        prochlorperazine 5 MG tablet   Commonly known as:  COMPAZINE   Dose:  5 mg   Quantity:  90 tablet        Take 1 tablet (5 mg) by mouth every 6 hours as needed for nausea or vomiting   Refills:  0        ranitidine 75 MG tablet   Commonly known as:  ZANTAC   Dose:  150 mg   Quantity:  90 tablet        Take 2 tablets (150 mg) by mouth daily   Refills:  1        tiotropium 18 MCG capsule   Commonly  known as:  SPIRIVA HANDIHALER   Quantity:  30 capsule        Inhale contents of one capsule daily.   Refills:  1        VITAMIN D (CHOLECALCIFEROL) PO   Dose:  2000 Units        Take 2,000 Units by mouth daily   Refills:  0                Prescriptions were sent or printed at these locations (2 Prescriptions)                   Other Prescriptions                Printed at Department/Unit printer (2 of 2)         tiZANidine (ZANAFLEX) 4 MG tablet               methylPREDNISolone (MEDROL DOSEPAK) 4 MG tablet                Procedures and tests performed during your visit     Peripheral IV catheter      Orders Needing Specimen Collection     None      Pending Results     No orders found from 6/16/2017 to 6/19/2017.            Pending Culture Results     No orders found from 6/16/2017 to 6/19/2017.            Pending Results Instructions     If you had any lab results that were not finalized at the time of your Discharge, you can call the ED Lab Result RN at [phone]. You will be contacted by this team for any positive Lab results or changes in treatment. The nurses are available 7 days a week from 10A to 6:30P.  You can leave a message 24 hours per day and they will return your call.        Test Results From Your Hospital Stay               Clinical Quality Measure: Blood Pressure Screening     Your blood pressure was checked while you were in the emergency department today. The last reading we obtained was  BP: 122/83 . Please read the guidelines below about what these numbers mean and what you should do about them.  If your systolic blood pressure (the top number) is less than 120 and your diastolic blood pressure (the bottom number) is less than 80, then your blood pressure is normal. There is nothing more that you need to do about it.  If your systolic blood pressure (the top number) is 120-139 or your diastolic blood pressure (the bottom number) is 80-89, your blood pressure may be higher than it should be.  "You should have your blood pressure rechecked within a year by a primary care provider.  If your systolic blood pressure (the top number) is 140 or greater or your diastolic blood pressure (the bottom number) is 90 or greater, you may have high blood pressure. High blood pressure is treatable, but if left untreated over time it can put you at risk for heart attack, stroke, or kidney failure. You should have your blood pressure rechecked by a primary care provider within the next 4 weeks.  If your provider in the emergency department today gave you specific instructions to follow-up with your doctor or provider even sooner than that, you should follow that instruction and not wait for up to 4 weeks for your follow-up visit.        Thank you for choosing [facility]       Thank you for choosing [facility] for your care. Our goal is always to provide you with excellent care. Hearing back from our patients is one way we can continue to improve our services. Please take a few minutes to complete the written survey that you may receive in the mail after you visit with us. Thank you!        [employer] Information     [employer] lets you send messages to your doctor, view your test results, renew your prescriptions, schedule appointments and more. To sign up, go to [email or web address] . Click on \"Log in\" on the left side of the screen, which will take you to the Welcome page. Then click on \"Sign up Now\" on the right side of the page.     You will be asked to enter the access code listed below, as well as some personal information. Please follow the directions to create your username and password.     Your access code is: [email or web address]  Expires: 2017  6:42 PM     Your access code will  in 90 days. If you need help or a new code, please call your [facility] clinic or [phone].        Care EveryWhere ID     This is your Care EveryWhere ID. This could be used by other organizations to access your [facility] medical " records  [id number]        After Visit Summary       This is your record. Keep this with you and show to your community pharmacist(s) and doctor(s) at your next visit.

## 2017-06-18 NOTE — ED NOTES
Patient discharged to home. Patient received follow-up information with PCP tomorrow and MRI and medication instructions for Zanaflex and Steroid. Patient received discharge instructions and has no other questions at this time.

## 2017-06-18 NOTE — ED AVS SNAPSHOT
[facility]    [address]    [facility] [address]    Phone:  [phone]    Fax:  [phone]                                       Swetha Patterson   MRN: [id number]    Department:  [facility]   Date of Visit:  6/18/2017           After Visit Summary Signature Page     I have received my discharge instructions, and my questions have been answered. I have discussed any challenges I see with this plan with the nurse or doctor.    ..........................................................................................................................................  Patient/Patient Representative Signature      ..........................................................................................................................................  Patient Representative Print Name and Relationship to Patient    ..................................................               ................................................  Date                                            Time    ..........................................................................................................................................  Reviewed by Signature/Title    ...................................................              ..............................................  Date                                                            Time

## 2017-06-18 NOTE — DISCHARGE INSTRUCTIONS
Understanding Cervical Radiculopathy    Cervical radiculopathy is irritation or inflammation of a nerve root in the neck. It causes neck pain and other symptoms that may spread into the chest or down the arm. To understand this condition, it helps to understand the parts of the spine:    Vertebrae. These are bones that stack to form the spine. The cervical spine contains the 7 vertebrae in the neck.    Disks. These are soft pads of tissue between the vertebrae. They act as shock absorbers for the spine.    The spinal canal. This is a tunnel formed within the stacked vertebrae. The spinal cord runs through this canal.    Nerves. These branch off the spinal cord. As they leave the spinal canal, nerves pass through openings between the vertebrae. The nerve root is the part of the nerve that is closest to the spinal cord.   With cervical radiculopathy, nerve roots in the neck become irritated. This leads to pain and symptoms that can travel to the nerves that go from the spinal cord down the arms and into the torso.  What causes cervical radiculopathy?  Aging, injury, poor posture, and other issues can lead to problems in the neck. These problems may then irritate nerve roots. These include:    Damage to a disk in the cervical spine. The damaged disk may then press on nearby nerve roots.    Degeneration from wear and tear, and aging. This can lead to narrowing (stenosis) of the openings between the vertebrae. The narrowed openings press on nerve roots as they leave the spinal canal.    An unstable spine. This is when a vertebra slips forward. It can then press on a nerve root.  There are other, less common causes of pressure on nerves in the neck. These include infection, cysts, and tumors.  Symptoms of cervical radiculopathy  These include:    Neck pain    Pain, numbness, tingling, or weakness that travels down the arm    Loss of neck movement    Muscle spasms  Treatment for cervical radiculopathy  In most cases,  your healthcare provider will first try treatments that help relieve symptoms. These may include:    Prescription or over-the-counter pain medicines. These help relieve pain and swelling.    Cold packs. These help reduce pain.    Resting. This involves avoiding positions and activities that increase pain.    Neck brace (cervical collar). This can help relieve inflammation and pain.    Physical therapy, including exercises and stretches. This can help decrease pain and increase movement and function.    Shots of medicinesaround the nerve roots. This is done to help relieve symptoms for a time.  In some cases, your healthcare provider may advise surgery to fix the underlying problem. This depends on the cause, the symptoms, and how long the pain has lasted.  Possible complications  Over time, an irritated and inflamed nerve may become damaged. This may lead to long-lasting (permanent) numbness or weakness. If symptoms change suddenly or get worse, be sure to let your healthcare provider know.     When to call your healthcare provider  Call your healthcare provider right away if you have any of these:    New pain or pain that gets worse    New or increasing weakness, numbness, or tingling in your arm or hand    Bowel or bladder changes   Date Last Reviewed: 3/10/2016    5001-1104 [employer]. [address] [place] [address], [place] [address]. All rights reserved. This information is not intended as a substitute for professional medical care. Always follow your healthcare professional's instructions.

## 2017-06-20 ENCOUNTER — HOSPITAL ENCOUNTER (OUTPATIENT)
Dept: MRI IMAGING | Facility: CLINIC | Age: 47
Discharge: HOME | End: 2017-06-20
Attending: ORTHOPAEDIC SURGERY | Admitting: ORTHOPAEDIC SURGERY
Payer: MEDICARE

## 2017-06-20 DIAGNOSIS — M54.12 CERVICAL RADICULOPATHY: ICD-10-CM

## 2017-06-20 PROCEDURE — 72141 MRI NECK SPINE W/O DYE: CPT

## 2017-06-23 ENCOUNTER — OFFICE VISIT (OUTPATIENT)
Dept: ORTHOPEDICS | Facility: CLINIC | Age: 47
End: 2017-06-23
Payer: MEDICARE

## 2017-06-23 VITALS
HEIGHT: 64 IN | WEIGHT: 184 LBS | DIASTOLIC BLOOD PRESSURE: 76 MMHG | BODY MASS INDEX: 31.41 KG/M2 | SYSTOLIC BLOOD PRESSURE: 108 MMHG

## 2017-06-23 DIAGNOSIS — M48.02 FORAMINAL STENOSIS OF CERVICAL REGION: Primary | ICD-10-CM

## 2017-06-23 PROCEDURE — 99213 OFFICE O/P EST LOW 20 MIN: CPT | Performed by: ORTHOPAEDIC SURGERY

## 2017-06-23 NOTE — PROGRESS NOTES
"HISTORY OF PRESENT ILLNESS:    Swetha Patterson is a 47 year old female who is seen in follow up for cervical MRI results.  Pt states symptoms are unchanged since her visit last week, states pain has been bad lately, was in the ED this past weekend as a result.  Pt states she has been using more ice for tx.    PHYSICAL EXAM:  /76 (BP Location: Right arm, Patient Position: Sitting, Cuff Size: Adult Regular)  Ht 5' 4\" (1.626 m)  Wt 184 lb (83.5 kg)  LMP 04/15/2016  BMI 31.58 kg/m2  Body mass index is 31.58 kg/(m^2).   GENERAL APPEARANCE: healthy, alert and no distress   SKIN: no suspicious lesions or rashes  NEURO: Normal strength and tone, mentation intact and speech normal  VASCULAR:  good pulses, and cappillary refill   LYMPH: no lymphadenopathy   PSYCH:  mentation appears normal and affect normal/bright    MSK:  Exam is unchanged from that seen 2 weeks ago.    IMAGING INTERPRETATION:    Results for orders placed or performed during the hospital encounter of 06/20/17   MR Cervical Spine w/o Contrast    Narrative    MR CERVICAL SPINE WITHOUT CONTRAST June 20, 2017 6:22 PM    HISTORY: Bilateral neck and arm pain.    COMPARISON: Plain films 6/16/2017.    TECHNIQUE: Routine MR cervical spine through T3.    FINDINGS: Vertebral body bone marrow signal is normal and the  alignment is normal through T3. Cervical and upper thoracic spinal  cord appear intrinsically normal. Craniocervical junction region is  normal.     Findings by levels is as follows:    C2-C3: Negative. No disc protrusion. No central or lateral stenosis.  There is moderate facet joint disease bilaterally.    C3-C4: No disc protrusion. No central or lateral stenosis. No  significant facet joint disease.    C4-C5: Negative.    C5-C6: Moderate narrowing of the interspace. Mild ventral disc  osteophyte complex and uncinate spurs. This results in mild central  stenosis and moderate bilateral foraminal stenosis which could be a  compression of either " exiting C6 nerve root.    C6-C7: Mild annular bulge. No significant central or lateral stenosis.    C7-T1: Negative.      Impression    IMPRESSION: Degenerative changes as described most marked at C5-C6  where there is mild central stenosis and moderate bilateral foraminal  stenosis.    ORLANDO OAKLEY MD          ASSESSMENT / PLAN: DDD cervical spine. I ordered an epidural to be done at C5 6.      Return to clinic after the epidural to let me know the results.    Lui Benson MD  Dept. Orthopedic Surgery  [facility]       Disclaimer: This note consists of symbols derived from keyboarding, dictation and/or voice recognition software. As a result, there may be errors in the script that have gone undetected. Please consider this when interpreting information found in this chart.

## 2017-06-23 NOTE — MR AVS SNAPSHOT
After Visit Summary   6/23/2017    Swetha Patterson    MRN: [id number]           Patient Information     Date Of Birth          1970        Visit Information        Provider Department      6/23/2017 1:40 PM Jose Francisco Benson MD [facility] ORTHOPEDIC SURGERY        Today's Diagnoses     Foraminal stenosis of cervical region    -  1       Follow-ups after your visit        Your next 10 appointments already scheduled     Jul 27, 2017 12:00 PM CDT   [facility]    [address] [place] [address]   [phone]            Aug 14, 2017  2:15 PM CDT   [facility] Visit with Milena Joseph NP   [facility]    [address]  [address]  [facility] [address]   [phone]            Aug 18, 2017 10:00 AM CDT   (Arrive by 9:30 AM)   NEW THROAT with Lis Talbert MD   [facility] ([facility] [place])    [address] [place] [id number] [phone]           - This is a multidisciplinary care team visit with an ENT physician and may include a speech language pathologist. - It is important to know that if you are evaluated and/or treated by both a physician and a speech pathologist during your visit, your billing will reflect the input that you receive from both providers as separate professionals. Although most insurance plans do cover these services, we encourage you to contact your insurance company prior to your visit to determine whether there are any coverage limitations that might affect you financially. - Billing/procedure codes that are frequently associated with visits to our clinic include (but are not limited to) the ones listed below. Most patients will not need all of these items, but it may be useful to ask your insurance company's patient  ". 48173: Flexible fiberoptic laryngoscopy, 47521: Laryngoscopy; flexible or rigid fiberoptic, with stroboscopy, 47263: Flexible endoscopic evaluation of swallowing, 08414: Evaluation of Voice and Resonance, 95041: Speech pathology treatment for voice, speech, communication, 32054: Speech pathology treatment for swallowing/oral function for feeding - If you have any concerns or questions, or if you would prefer not to have a speech pathologist involved in your visit, please contact our Clinic Coordinator at [phone], at least 24 hours prior to your appointment.              Who to contact     If you have questions or need follow up information about today's clinic visit or your schedule please contact [facility] ORTHOPEDIC SURGERY directly at [phone].  Normal or non-critical lab and imaging results will be communicated to you by ShoutEmhart, letter or phone within 4 business days after the clinic has received the results. If you do not hear from us within 7 days, please contact the clinic through ShoutEmhart or phone. If you have a critical or abnormal lab result, we will notify you by phone as soon as possible.  Submit refill requests through Glints or call your pharmacy and they will forward the refill request to us. Please allow 3 business days for your refill to be completed.          Additional Information About Your Visit        Glints Information     Glints lets you send messages to your doctor, view your test results, renew your prescriptions, schedule appointments and more. To sign up, go to [email or web address] . Click on \"Log in\" on the left side of the screen, which will take you to the Welcome page. Then click on \"Sign up Now\" on the right side of the page.     You will be asked to enter the access code listed below, as well as some personal information. Please follow the directions to create your username and password.     Your access code is: [email or web address]  Expires: " "10/1/2017  7:07 PM     Your access code will  in 90 days. If you need help or a new code, please call your [facility] or [phone].        Care EveryWhere ID     This is your Care EveryWhere ID. This could be used by other organizations to access your [facility] medical records  [id number]        Your Vitals Were     Height Last Period BMI (Body Mass Index)             5' 4\" (1.626 m) 04/15/2016 31.58 kg/m2          Blood Pressure from Last 3 Encounters:   17 162/80   17 122/60   17 110/82    Weight from Last 3 Encounters:   17 188 lb 6.4 oz (85.5 kg)   17 189 lb (85.7 kg)   17 186 lb 14.4 oz (84.8 kg)               Primary Care Provider Office Phone # Fax #    Roro Janine Chawla [phone]       [facility] [place] [facility] [address] [facility] [address]        Equal Access to Services     [facility] Center[facility] : Hadii aad ku hadasho Soomaali, waaxda luqadaha, qaybta kaalmada adejason, caryl cowan . So [facility] [phone].    ATENCIÓN: Si habla español, tiene a vargas disposición servicios gratuitos de asistencia lingüística. Briseida al [phone].    We comply with applicable federal civil rights laws and [place] laws. We do not discriminate on the basis of race, color, national origin, age, disability sex, sexual orientation or gender identity.            Thank you!     Thank you for choosing [facility] ORTHOPEDIC SURGERY  for your care. Our goal is always to provide you with excellent care. Hearing back from our patients is one way we can continue to improve our services. Please take a few minutes to complete the written survey that you may receive in the mail after your visit with us. Thank you!             Your Updated Medication List - Protect others around you: Learn how to safely use, store and throw away your medicines at www.disposemymeds.org.          This list is accurate as of: 17 11:59 PM.  " Always use your most recent med list.                   Brand Name Dispense Instructions for use Diagnosis    albuterol 108 (90 BASE) MCG/ACT Inhaler    VENTOLIN HFA    3 Inhaler    Inhale 2 puffs into the lungs every 6 hours    Shortness of breath       amLODIPine 10 MG tablet    NORVASC    90 tablet    Take 1 tablet (10 mg) by mouth daily    HTN, goal below 140/90       atorvastatin 80 MG tablet    LIPITOR    90 tablet    Take 1 tablet (80 mg) by mouth daily    Family history of ischemic heart disease, Hyperlipidemia LDL goal <130       budesonide-formoterol 160-4.5 MCG/ACT Inhaler    SYMBICORT    3 Inhaler    Inhale 2 puffs into the lungs 2 times daily    Uncomplicated asthma, unspecified asthma severity       cetirizine HCl 10 MG Caps     90 capsule    Take 1 capsule by mouth daily    Chronic rhinitis       chlorhexidine 4 % liquid    HIBICLENS    946 mL    Wash ab, groin, thighs daily in shower DO NOT PUT ON FACE    Hidradenitis suppurativa       clindamycin-benzoyl peroxide gel    BENZACLIN    100 g    Apply topically 2 times daily    Hidradenitis suppurativa       desonide 0.05 % cream    DESOWEN    60 g    Apply sparingly to affected area three times daily as needed.    Localized pruritus       doxycycline Monohydrate 100 MG Tabs     42 tablet    One pill by mouth twice daily    Hidradenitis suppurativa       fluticasone 50 MCG/ACT spray    FLONASE    16 g    Spray 2 sprays into both nostrils daily    Severe chronic obstructive pulmonary disease (H)       furosemide 20 MG tablet    LASIX    90 tablet    Take 1 tablet (20 mg) by mouth daily as needed    Bilateral leg edema       ipratropium - albuterol 0.5 mg/2.5 mg/3 mL 0.5-2.5 (3) MG/3ML neb solution    DUONEB    360 mL    Take 1 vial (3 mLs) by nebulization every 6 hours    Uncomplicated asthma, unspecified asthma severity       irbesartan 300 MG tablet    AVAPRO    90 tablet    Take 1 tablet (300 mg) by mouth daily    Essential hypertension with goal  blood pressure less than 140/90       ketoconazole 2 % shampoo    NIZORAL    120 mL    Apply to the affected area and wash off after 5 minutes.    Localized pruritus       lisinopril 20 MG tablet    PRINIVIL/ZESTRIL    90 tablet    Take 1 tablet (20 mg) by mouth daily    Essential hypertension with goal blood pressure less than 140/90       montelukast 10 MG tablet    SINGULAIR    30 tablet    Take 1 tablet (10 mg) by mouth At Bedtime    Dust allergy, Severe chronic obstructive pulmonary disease (H)       NICORETTE 2 MG lozenge   Generic drug:  nicotine polacrilex      PLACE 1 LOZENGE INSIDE CHEEK Q HOUR PRF SMOKING CESSATION UTD        ondansetron 8 MG tablet    ZOFRAN    30 tablet    Take 0.5-1 tablets (4-8 mg) by mouth every 8 hours as needed for nausea    Nausea       order for DME     1 Device    Equipment being ordered: Nebulizer machine Length of need-lifetime    Chronic lung disease, Cough, Severe persistent asthma with acute exacerbation       pantoprazole 20 MG EC tablet    PROTONIX    90 tablet    Take 2 tablets (40 mg) by mouth every morning (before breakfast)    Gastroesophageal reflux disease without esophagitis       prochlorperazine 5 MG tablet    COMPAZINE    90 tablet    Take 1 tablet (5 mg) by mouth every 6 hours as needed for nausea or vomiting    Nausea       ranitidine 75 MG tablet    ZANTAC    90 tablet    Take 2 tablets (150 mg) by mouth daily    Uncomplicated asthma, unspecified asthma severity       tiotropium 18 MCG capsule    SPIRIVA HANDIHALER    30 capsule    Inhale contents of one capsule daily.    Other emphysema (H)       tiZANidine 4 MG tablet    ZANAFLEX    20 tablet    Take 1 tablet (4 mg) by mouth 3 times daily as needed for muscle spasms (MUSCLE SPASM)        VITAMIN D (CHOLECALCIFEROL) PO      Take 2,000 Units by mouth daily

## 2017-06-23 NOTE — NURSING NOTE
"Chief Complaint   Patient presents with     Results     Cervical MRI results       Initial /76 (BP Location: Right arm, Patient Position: Sitting, Cuff Size: Adult Regular)  Ht 5' 4\" (1.626 m)  Wt 184 lb (83.5 kg)  LMP 04/15/2016  BMI 31.58 kg/m2 Estimated body mass index is 31.58 kg/(m^2) as calculated from the following:    Height as of this encounter: 5' 4\" (1.626 m).    Weight as of this encounter: 184 lb (83.5 kg).  Medication Reconciliation: complete    "

## 2017-06-28 ENCOUNTER — ALLIED HEALTH/NURSE VISIT (OUTPATIENT)
Dept: NURSING | Facility: CLINIC | Age: 47
End: 2017-06-28

## 2017-06-28 ENCOUNTER — OFFICE VISIT (OUTPATIENT)
Dept: OBGYN | Facility: CLINIC | Age: 47
End: 2017-06-28
Payer: MEDICARE

## 2017-06-28 VITALS
DIASTOLIC BLOOD PRESSURE: 82 MMHG | SYSTOLIC BLOOD PRESSURE: 110 MMHG | WEIGHT: 186.9 LBS | TEMPERATURE: 98 F | HEIGHT: 64 IN | BODY MASS INDEX: 31.91 KG/M2

## 2017-06-28 DIAGNOSIS — J44.9 SEVERE CHRONIC OBSTRUCTIVE PULMONARY DISEASE (H): Chronic | ICD-10-CM

## 2017-06-28 DIAGNOSIS — R07.89 CHEST WALL PAIN: ICD-10-CM

## 2017-06-28 DIAGNOSIS — N95.2 ATROPHIC VAGINITIS: ICD-10-CM

## 2017-06-28 DIAGNOSIS — F11.20 CONTINUOUS OPIOID DEPENDENCE (H): Chronic | ICD-10-CM

## 2017-06-28 DIAGNOSIS — N93.8 DUB (DYSFUNCTIONAL UTERINE BLEEDING): ICD-10-CM

## 2017-06-28 DIAGNOSIS — F41.9 ANXIETY: ICD-10-CM

## 2017-06-28 DIAGNOSIS — Z11.3 SCREENING EXAMINATION FOR VENEREAL DISEASE: ICD-10-CM

## 2017-06-28 DIAGNOSIS — F11.20 CONTINUOUS OPIOID DEPENDENCE (H): Primary | ICD-10-CM

## 2017-06-28 DIAGNOSIS — M79.10 MUSCLE PAIN: ICD-10-CM

## 2017-06-28 DIAGNOSIS — N95.1 MENOPAUSAL SYNDROME (HOT FLASHES): ICD-10-CM

## 2017-06-28 DIAGNOSIS — R30.0 DYSURIA: Primary | ICD-10-CM

## 2017-06-28 LAB
ALBUMIN UR-MCNC: NEGATIVE MG/DL
APPEARANCE UR: CLEAR
BILIRUB UR QL STRIP: NEGATIVE
COLOR UR AUTO: YELLOW
ESTRADIOL SERPL-MCNC: 15 PG/ML
FSH SERPL-ACNC: 42.5 IU/L
GLUCOSE UR STRIP-MCNC: NEGATIVE MG/DL
HGB UR QL STRIP: NEGATIVE
KETONES UR STRIP-MCNC: NEGATIVE MG/DL
LEUKOCYTE ESTERASE UR QL STRIP: NEGATIVE
MICRO REPORT STATUS: NORMAL
NITRATE UR QL: NEGATIVE
PH UR STRIP: 5.5 PH (ref 5–7)
SP GR UR STRIP: 1.01 (ref 1–1.03)
SPECIMEN SOURCE: NORMAL
URN SPEC COLLECT METH UR: NORMAL
UROBILINOGEN UR STRIP-ACNC: 0.2 EU/DL (ref 0.2–1)
WET PREP SPEC: NORMAL

## 2017-06-28 PROCEDURE — 87086 URINE CULTURE/COLONY COUNT: CPT | Performed by: OBSTETRICS & GYNECOLOGY

## 2017-06-28 PROCEDURE — 86803 HEPATITIS C AB TEST: CPT | Performed by: OBSTETRICS & GYNECOLOGY

## 2017-06-28 PROCEDURE — 87210 SMEAR WET MOUNT SALINE/INK: CPT | Performed by: OBSTETRICS & GYNECOLOGY

## 2017-06-28 PROCEDURE — 87591 N.GONORRHOEAE DNA AMP PROB: CPT | Performed by: OBSTETRICS & GYNECOLOGY

## 2017-06-28 PROCEDURE — 87389 HIV-1 AG W/HIV-1&-2 AB AG IA: CPT | Performed by: OBSTETRICS & GYNECOLOGY

## 2017-06-28 PROCEDURE — 81003 URINALYSIS AUTO W/O SCOPE: CPT | Performed by: OBSTETRICS & GYNECOLOGY

## 2017-06-28 PROCEDURE — 82670 ASSAY OF TOTAL ESTRADIOL: CPT | Performed by: OBSTETRICS & GYNECOLOGY

## 2017-06-28 PROCEDURE — 36415 COLL VENOUS BLD VENIPUNCTURE: CPT | Performed by: OBSTETRICS & GYNECOLOGY

## 2017-06-28 PROCEDURE — 87491 CHLMYD TRACH DNA AMP PROBE: CPT | Performed by: OBSTETRICS & GYNECOLOGY

## 2017-06-28 PROCEDURE — 83001 ASSAY OF GONADOTROPIN (FSH): CPT | Performed by: OBSTETRICS & GYNECOLOGY

## 2017-06-28 PROCEDURE — 99203 OFFICE O/P NEW LOW 30 MIN: CPT | Performed by: OBSTETRICS & GYNECOLOGY

## 2017-06-28 PROCEDURE — 87340 HEPATITIS B SURFACE AG IA: CPT | Performed by: OBSTETRICS & GYNECOLOGY

## 2017-06-28 PROCEDURE — 86780 TREPONEMA PALLIDUM: CPT | Performed by: OBSTETRICS & GYNECOLOGY

## 2017-06-28 RX ORDER — ESTRADIOL 0.1 MG/G
CREAM VAGINAL
Qty: 42.5 G | Refills: 6 | Status: ON HOLD | OUTPATIENT
Start: 2017-06-28 | End: 2017-10-19

## 2017-06-28 RX ORDER — DIAZEPAM 10 MG
10 TABLET ORAL EVERY 8 HOURS PRN
Qty: 120 TABLET | Refills: 0 | Status: CANCELLED | OUTPATIENT
Start: 2017-06-28

## 2017-06-28 RX ORDER — OXYCODONE HYDROCHLORIDE 10 MG/1
TABLET ORAL
Qty: 150 TABLET | Refills: 0 | Status: CANCELLED | OUTPATIENT
Start: 2017-06-28

## 2017-06-28 RX ORDER — VENLAFAXINE HYDROCHLORIDE 37.5 MG/1
CAPSULE, EXTENDED RELEASE ORAL
Qty: 46 CAPSULE | Refills: 0 | Status: ON HOLD | OUTPATIENT
Start: 2017-06-28 | End: 2018-02-09

## 2017-06-28 NOTE — NURSING NOTE
"Chief Complaint   Patient presents with     Menopausal Sx     changes in menses, hot flashes , irritable x 2 years perla past 2 months. Vaginal problem - not feeling right vaginally / painful intercourse       Initial /82 (BP Location: Right arm, Patient Position: Sitting, Cuff Size: Adult Large)  Temp 98  F (36.7  C) (Oral)  Ht 5' 4\" (1.626 m)  Wt 186 lb 14.4 oz (84.8 kg)  LMP 04/15/2016  BMI 32.08 kg/m2 Estimated body mass index is 32.08 kg/(m^2) as calculated from the following:    Height as of this encounter: 5' 4\" (1.626 m).    Weight as of this encounter: 186 lb 14.4 oz (84.8 kg).  Medication Reconciliation: incomplete    "

## 2017-06-28 NOTE — MR AVS SNAPSHOT
After Visit Summary   6/28/2017    Swetha Patterson    MRN: [id number]           Patient Information     Date Of Birth          1970        Visit Information        Provider Department      6/28/2017 1:30 PM Rn, [facility]        Today's Diagnoses     Continuous opioid dependence (H)    -  1    Chest wall pain        Chr Lung Disease - managed by the pulm dept ([facility])^^^        Continuous opioid dependence (HCC) opioid for chr cough and chest pain ^^^^        Muscle pain        Anxiety           Follow-ups after your visit        Your next 10 appointments already scheduled     Jun 29, 2017  8:45 AM CDT   Return Visit with Milena Joseph NP   [facility] ([facility])    [address]  [address]  [facility] [address] [phone]            Jun 30, 2017 10:00 AM CDT   XR CERVIC/THORACIC TRANSFORAMINAL INJECTION with AMAN,  IMAGING NURSE, AUDI MSK RAD   [facility] ([facility])    [address] [place] [id number] [phone]           For nerve root injection, please send or bring copies of any MRIs or other scans you have had.  Bring a list of your current medicines to your exam. (Include vitamins, minerals and over-the-counter medicines.) Leave your valuables at home.  Plan to have someone drive you home afterward.  Stop taking the following medicines (but talk to your doctor first):   If you take blood thinners, you may need to stop taking them a few days before treatment. Talk to your doctor before stopping these medicines.Stop taking Coumadin (warfarin) 3 days before treatment. Restart the day after treatment.   If you take Plavix, Ticlid, Pletal or Persantine, please ask your doctor if you should stop these medicines. You may need extra tests on the morning of your scan. You may take your other medicines as normal.  Stop all food and drink (including water) 3 hours before  your test or treatment.  Please tell the doctor:   If you are allergic to X-ray dye (contrast fluid).   If you may be pregnant.  Injections take about 30 to 45 minutes. Most people spend up to 2 hours in the clinic or hospital.  Please call the Imaging Department at your exam site with any questions            Aug 18, 2017 10:00 AM CDT   (Arrive by 9:30 AM)   NEW THROAT with Lis Talbert MD   [facility])    [address] [place] [address]  [address] [facility] [address] [phone]           - This is a multidisciplinary care team visit with an ENT physician and may include a speech language pathologist. - It is important to know that if you are evaluated and/or treated by both a physician and a speech pathologist during your visit, your billing will reflect the input that you receive from both providers as separate professionals. Although most insurance plans do cover these services, we encourage you to contact your insurance company prior to your visit to determine whether there are any coverage limitations that might affect you financially. - Billing/procedure codes that are frequently associated with visits to our clinic include (but are not limited to) the ones listed below. Most patients will not need all of these items, but it may be useful to ask your insurance company's patient . 49562: Flexible fiberoptic laryngoscopy, 25675: Laryngoscopy; flexible or rigid fiberoptic, with stroboscopy, 30038: Flexible endoscopic evaluation of swallowing, 22669: Evaluation of Voice and Resonance, 59162: Speech pathology treatment for voice, speech, communication, 89535: Speech pathology treatment for swallowing/oral function for feeding - If you have any concerns or questions, or if you would prefer not to have a speech pathologist involved in your visit, please contact our Clinic Coordinator at [phone], at least 24 hours prior  "to your appointment.              Who to contact     If you have questions or need follow up information about today's clinic visit or your schedule please contact [facility] directly at [phone].  Normal or non-critical lab and imaging results will be communicated to you by MyChart, letter or phone within 4 business days after the clinic has received the results. If you do not hear from us within 7 days, please contact the clinic through MyChart or phone. If you have a critical or abnormal lab result, we will notify you by phone as soon as possible.  Submit refill requests through [employer] or call your pharmacy and they will forward the refill request to us. Please allow 3 business days for your refill to be completed.          Additional Information About Your Visit        [email or web address] Information     [employer] lets you send messages to your doctor, view your test results, renew your prescriptions, schedule appointments and more. To sign up, go to [email or web address] . Click on \"Log in\" on the left side of the screen, which will take you to the Welcome page. Then click on \"Sign up Now\" on the right side of the page.     You will be asked to enter the access code listed below, as well as some personal information. Please follow the directions to create your username and password.     Your access code is: [email or web address]  Expires: 2017  6:42 PM     Your access code will  in 90 days. If you need help or a new code, please call your [facility] clinic or [phone].        Care EveryWhere ID     This is your Care EveryWhere ID. This could be used by other organizations to access your [facility] medical records  [id number]        Your Vitals Were     Last Period                   04/15/2016            Blood Pressure from Last 3 Encounters:   17 110/82   17 108/76   17 113/75    Weight from Last 3 Encounters:   17 186 lb 14.4 oz (84.8 kg)   17 184 lb (83.5 kg) "   06/16/17 184 lb (83.5 kg)              Today, you had the following     No orders found for display       Primary Care Provider Office Phone # Fax #    Roro Chawla [phone]       [facility] [address] [facility] [address]        Equal Access to Services     CHARITY MANDUJANO : Hadii aad ku hadasho Soomaali, waaxda luqadaha, qaybta kaalmada adeegyada, waxay idiin hayaan adeeg kharash la'aan . So [facility] [phone].    ATENCIÓN: Si habla español, tiene a vargas disposición servicios gratuitos de asistencia lingüística. Briseida al [phone].    We comply with applicable federal civil rights laws and [place] laws. We do not discriminate on the basis of race, color, national origin, age, disability sex, sexual orientation or gender identity.            Thank you!     Thank you for choosing [facility]  for your care. Our goal is always to provide you with excellent care. Hearing back from our patients is one way we can continue to improve our services. Please take a few minutes to complete the written survey that you may receive in the mail after your visit with us. Thank you!             Your Updated Medication List - Protect others around you: Learn how to safely use, store and throw away your medicines at www.disposemymeds.org.          This list is accurate as of: 6/28/17  2:11 PM.  Always use your most recent med list.                   Brand Name Dispense Instructions for use Diagnosis    albuterol 108 (90 BASE) MCG/ACT Inhaler    VENTOLIN HFA    3 Inhaler    Inhale 2 puffs into the lungs every 6 hours    Shortness of breath       amLODIPine 10 MG tablet    NORVASC    90 tablet    Take 1 tablet (10 mg) by mouth daily    HTN, goal below 140/90       atorvastatin 80 MG tablet    LIPITOR    90 tablet    Take 1 tablet (80 mg) by mouth daily    Family history of ischemic heart disease, Hyperlipidemia LDL goal <130       budesonide-formoterol 160-4.5  MCG/ACT Inhaler    SYMBICORT    3 Inhaler    Inhale 2 puffs into the lungs 2 times daily    Uncomplicated asthma, unspecified asthma severity       cetirizine HCl 10 MG Caps     90 capsule    Take 1 capsule by mouth daily    Chronic rhinitis       chlorhexidine 4 % liquid    HIBICLENS    946 mL    Wash ab, groin, thighs daily in shower DO NOT PUT ON FACE    Hidradenitis suppurativa       clindamycin-benzoyl peroxide gel    BENZACLIN    100 g    Apply topically 2 times daily    Hidradenitis suppurativa       desonide 0.05 % cream    DESOWEN    60 g    Apply sparingly to affected area three times daily as needed.    Localized pruritus       diazepam 10 MG tablet    VALIUM    120 tablet    Take 1 tablet (10 mg) by mouth every 8 hours as needed for anxiety or sleep For sleep, anxiety, and muscle tension.    Chest wall pain, Muscle pain, Anxiety       doxycycline Monohydrate 100 MG Tabs     42 tablet    One pill by mouth twice daily    Hidradenitis suppurativa       escitalopram 10 MG tablet    LEXAPRO    90 tablet    Take 1 tablet (10 mg) by mouth daily    Anxiety       fluticasone 50 MCG/ACT spray    FLONASE    16 g    Spray 2 sprays into both nostrils daily    Severe chronic obstructive pulmonary disease (H)       furosemide 20 MG tablet    LASIX    90 tablet    Take 1 tablet (20 mg) by mouth daily as needed    Bilateral leg edema       hydrOXYzine 25 MG tablet    ATARAX    120 tablet    Take 1 tablet (25 mg) by mouth 3 times daily For anxiety    Anxiety       ipratropium - albuterol 0.5 mg/2.5 mg/3 mL 0.5-2.5 (3) MG/3ML neb solution    DUONEB    360 mL    Take 1 vial (3 mLs) by nebulization every 6 hours    Uncomplicated asthma, unspecified asthma severity       irbesartan 300 MG tablet    AVAPRO    90 tablet    Take 1 tablet (300 mg) by mouth daily    Essential hypertension with goal blood pressure less than 140/90       ketoconazole 2 % shampoo    NIZORAL    120 mL    Apply to the affected area and wash off after 5  minutes.    Localized pruritus       lisinopril 20 MG tablet    PRINIVIL/ZESTRIL    90 tablet    Take 1 tablet (20 mg) by mouth daily    Essential hypertension with goal blood pressure less than 140/90       montelukast 10 MG tablet    SINGULAIR    30 tablet    Take 1 tablet (10 mg) by mouth At Bedtime    Dust allergy, Severe chronic obstructive pulmonary disease (H)       NICORETTE 2 MG lozenge   Generic drug:  nicotine polacrilex      PLACE 1 LOZENGE INSIDE CHEEK Q HOUR PRF SMOKING CESSATION UTD        ondansetron 8 MG tablet    ZOFRAN    30 tablet    Take 0.5-1 tablets (4-8 mg) by mouth every 8 hours as needed for nausea    Nausea       order for DME     1 Device    Equipment being ordered: Nebulizer machine Length of need-lifetime    Chronic lung disease, Cough, Severe persistent asthma with acute exacerbation       oxyCODONE 10 MG IR tablet    ROXICODONE    150 tablet    5 times daily as needed    Chest wall pain, Severe chronic obstructive pulmonary disease (H), Continuous opioid dependence (H)       pantoprazole 20 MG EC tablet    PROTONIX    90 tablet    Take 2 tablets (40 mg) by mouth every morning (before breakfast)    Gastroesophageal reflux disease without esophagitis       prochlorperazine 5 MG tablet    COMPAZINE    90 tablet    Take 1 tablet (5 mg) by mouth every 6 hours as needed for nausea or vomiting    Nausea       ranitidine 75 MG tablet    ZANTAC    90 tablet    Take 2 tablets (150 mg) by mouth daily    Uncomplicated asthma, unspecified asthma severity       tiotropium 18 MCG capsule    SPIRIVA HANDIHALER    30 capsule    Inhale contents of one capsule daily.    Other emphysema (H)       tiZANidine 4 MG tablet    ZANAFLEX    20 tablet    Take 1 tablet (4 mg) by mouth 3 times daily as needed for muscle spasms (MUSCLE SPASM)        VITAMIN D (CHOLECALCIFEROL) PO      Take 2,000 Units by mouth daily

## 2017-06-28 NOTE — PROGRESS NOTES
SUBJECTIVE:                                                   Swetha Patterson is a 47 year old female who presents to clinic today for several concerns.    1. STD testing: She and her  were  for about a year. They recently reunited. She is concerned about possible STDs and would like to be tested.    2. For the last 3 years she has had very irregular menstrual cycles. She bled approximately every 2-3 months or on occasion would bleed every 2 weeks for several periods in a row. In April 2016 she had a normal period and then had no bleeding at all for 5 or 6 months. She then had one fairly normal cycle. In April 2017 she had a light period consisting of dark blood only, for a few days. She then had a heavier cycle in May. She has not had bleeding since then.    3. She has been having hot flashes and night sweats for over a year. They have become significantly more intense and frequent in the last several months. She is having difficulty sleeping. She notes consistent irritability and mood swings.    4. She has had an intermittent vaginal discharge with odor.    5. She is noted vaginal dryness and pain with intercourse. She does not use a lubricant. A year ago when sexually active with her  she did not have any pain.    Problem list and histories reviewed & adjusted, as indicated.  Additional history: as documented.    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)     Chr PAIN - Ctr SUBSTANCE AGREEMENT - SIGNED     Chr Lung Disease - managed by the pulm dept ([facility])^^^     Continuous opioid dependence (HCC) opioid for chr cough and chest pain ^^^^     DIAZ (nonalcoholic steatohepatitis)     Essential hypertension with goal blood pressure less  than 140/90     Past Surgical History:   Procedure Laterality Date     ARTHROSCOPY SHOULDER DECOMPRESSION Left 10/21/2015    Procedure: ARTHROSCOPY SHOULDER DECOMPRESSION;  Surgeon: Julien Milian MD;  Location: [facility] OR     BRONCHIAL THERMOPLASTY N/A 11/14/2014    Procedure: BRONCHIAL THERMOPLASTY;  Surgeon: Ward Whitaker MD;  Location: [facility]     BRONCHIAL THERMOPLASTY N/A 12/19/2014    Procedure: BRONCHIAL THERMOPLASTY;  Surgeon: Ward Whitaker MD;  Location: [facility] OR     BRONCHIAL THERMOPLASTY N/A 2/6/2015    Procedure: BRONCHIAL THERMOPLASTY;  Surgeon: Ward Whitaker MD;  Location: [facility] OR     C APPENDECTOMY  at age 17     EXCISE NODE MEDIASTINAL  4/26/2013    Procedure: EXCISE NODE MEDIASTINAL;;  Surgeon: Av Peña MD;  Location: [facility] OR     THORACOSCOPY  4/26/2013    Procedure: THORACOSCOPY;  LEFT VIDEO ASSISTED THORACOSCOPY, RESECTION OF POSTERIOR MEDIASTINAL MASS;  Surgeon: Av Peña MD;  Location: [facility] OR     TONSILLECTOMY  as a kid      Social History   Substance Use Topics     Smoking status: Former Smoker     Years: 30.00     Quit date: 9/1/2015     Smokeless tobacco: Never Used      Comment: . started Chantix 8/9/15     Alcohol use No      Problem (# of Occurrences) Relation (Name,Age of Onset)    CEREBROVASCULAR DISEASE (1) Mother    Depression (1) Mother    Family History Negative (2) Father: does not know  him, Brother    Gallbladder Disease (1) Mother    HEART DISEASE (3) Mother: murmur, mi, Sister: murmur, hole in heart, Maternal Grandfather    Hypertension (1) Mother              Current Outpatient Prescriptions on File Prior to Visit:  hydrOXYzine (ATARAX) 25 MG tablet Take 1 tablet (25 mg) by mouth 3 times daily For anxiety   doxycycline Monohydrate 100 MG TABS One pill by mouth twice daily   diazepam (VALIUM) 10 MG tablet Take 1 tablet (10 mg) by mouth every 8 hours as needed for anxiety or sleep For sleep, anxiety, and muscle tension.   oxyCODONE  (ROXICODONE) 10 MG IR tablet 5 times daily as needed   ondansetron (ZOFRAN) 8 MG tablet Take 0.5-1 tablets (4-8 mg) by mouth every 8 hours as needed for nausea   fluticasone (FLONASE) 50 MCG/ACT spray Spray 2 sprays into both nostrils daily   ranitidine (ZANTAC) 75 MG tablet Take 2 tablets (150 mg) by mouth daily   cetirizine HCl 10 MG CAPS Take 1 capsule by mouth daily   atorvastatin (LIPITOR) 80 MG tablet Take 1 tablet (80 mg) by mouth daily   escitalopram (LEXAPRO) 10 MG tablet Take 1 tablet (10 mg) by mouth daily   amLODIPine (NORVASC) 10 MG tablet Take 1 tablet (10 mg) by mouth daily   lisinopril (PRINIVIL/ZESTRIL) 20 MG tablet Take 1 tablet (20 mg) by mouth daily   irbesartan (AVAPRO) 300 MG tablet Take 1 tablet (300 mg) by mouth daily   montelukast (SINGULAIR) 10 MG tablet Take 1 tablet (10 mg) by mouth At Bedtime   furosemide (LASIX) 20 MG tablet Take 1 tablet (20 mg) by mouth daily as needed   NICORETTE 2 MG lozenge PLACE 1 LOZENGE INSIDE CHEEK Q HOUR PRF SMOKING CESSATION UTD   budesonide-formoterol (SYMBICORT) 160-4.5 MCG/ACT Inhaler Inhale 2 puffs into the lungs 2 times daily   ipratropium - albuterol 0.5 mg/2.5 mg/3 mL (DUONEB) 0.5-2.5 (3) MG/3ML neb solution Take 1 vial (3 mLs) by nebulization every 6 hours   tiotropium (SPIRIVA HANDIHALER) 18 MCG capsule Inhale contents of one capsule daily.   albuterol (VENTOLIN HFA) 108 (90 BASE) MCG/ACT Inhaler Inhale 2 puffs into the lungs every 6 hours   ketoconazole (NIZORAL) 2 % shampoo Apply to the affected area and wash off after 5 minutes.   desonide (DESOWEN) 0.05 % cream Apply sparingly to affected area three times daily as needed.   pantoprazole (PROTONIX) 20 MG tablet Take 2 tablets (40 mg) by mouth every morning (before breakfast)   prochlorperazine (COMPAZINE) 5 MG tablet Take 1 tablet (5 mg) by mouth every 6 hours as needed for nausea or vomiting   order for DME Equipment being ordered: Nebulizer machineLength of need-lifetime   clindamycin-benzoyl  peroxide (BENZACLIN) gel Apply topically 2 times daily   chlorhexidine (HIBICLENS) 4 % external liquid Wash ab, groin, thighs daily in shower DO NOT PUT ON FACE   VITAMIN D, CHOLECALCIFEROL, PO Take 2,000 Units by mouth daily     No current facility-administered medications on file prior to visit.   Allergies   Allergen Reactions     Codeine      vomiting     Hydrocodone Nausea and Vomiting     Sulfa Drugs      Nausea     Gabapentin Rash       ROS:  5 point ROS negative except as noted above in HPI, including Gen., Resp., CV, GI &  system review.    OBJECTIVE:     Exam:  Constitutional:  Appearance: Well nourished, well developed alert, in no acute distress  Gastrointestinal:  Abdominal Examination:  Abdomen nontender to palpation, tone normal without rigidity or guarding, no masses present, umbilicus without lesions; Liver/Spleen:  No hepatomegaly present, liver nontender to palpation; Hernias:  No hernias present  Neurologic/Psychiatric:  Mental Status:  Oriented X3   Pelvic Exam:  External Genitalia:     Normal appearance for age, no discharge present, no tenderness present, no inflammatory lesions present, color normal. There is evidence of vaginal atrophy.  Vagina:     Normal vaginal vault without central or paravaginal defects, no discharge present, no inflammatory lesions present, no masses present  Bladder:     Nontender to palpation  Urethra:   Urethral Body:  Urethra palpation normal, urethra structural support normal   Urethral Meatus:  No erythema or lesions present  Cervix:     Appearance healthy, no lesions present, nontender to palpation, no bleeding present  Uterus:     Uterus: firm, normal sized and nontender, anteverted in position.   Adnexa:     No adnexal tenderness present, no adnexal masses present  Perineum:     Perineum within normal limits, no evidence of trauma, no rashes or skin lesions present  Anus:     Anus within normal limits, no hemorrhoids present  Inguinal Lymph Nodes:     No  lymphadenopathy present  Pubic Hair:     Normal pubic hair distribution for age  Genitalia and Groin:     No rashes present, no lesions present, no areas of discoloration, no masses present       In-Clinic Test Results:  Results for orders placed or performed in visit on 06/28/17 (from the past 24 hour(s))   UA reflex to Microscopic and Culture   Result Value Ref Range    Color Urine Yellow     Appearance Urine Clear     Glucose Urine Negative NEG mg/dL    Bilirubin Urine Negative NEG    Ketones Urine Negative NEG mg/dL    Specific Gravity Urine 1.010 1.003 - 1.035    Blood Urine Negative NEG    pH Urine 5.5 5.0 - 7.0 pH    Protein Albumin Urine Negative NEG mg/dL    Urobilinogen Urine 0.2 0.2 - 1.0 EU/dL    Nitrite Urine Negative NEG    Leukocyte Esterase Urine Negative NEG    Source Midstream Urine    Wet prep   Result Value Ref Range    Specimen Description Vagina     Wet Prep       No Trichomonas seen  No yeast seen  No clue cells seen      Micro Report Status FINAL 06/28/2017        ASSESSMENT/PLAN:                                                        ICD-10-CM    1. Dysuria R30.0 UA reflex to Microscopic and Culture     Urine Culture Aerobic Bacterial     NEISSERIA GONORRHOEA PCR     CHLAMYDIA TRACHOMATIS PCR   2. Screening examination for venereal disease Z11.3 Wet prep     Anti Treponema     Hepatitis C antibody     HIV Antigen Antibody Combo     Hepatitis B surface antigen   3. DUB (dysfunctional uterine bleeding) N93.8 Follicle stimulating hormone     Estradiol     US Pelvic Complete with Transvaginal   4. Menopausal syndrome (hot flashes) N95.1 venlafaxine (EFFEXOR-XR) 37.5 MG 24 hr capsule   5. Atrophic vaginitis N95.2 estradiol (ESTRACE VAGINAL) 0.1 MG/GM cream         For STD screening is ordered today. She knows if everything is normal, I would recommend repeat of hepatitis, syphilis, and HIV screening in 3-6 months.     We will check an FSH and estradiol today. It is unclear whether she has ever  been truly menopausal. I suspect that she is instead perimenopausal. Therefore, I do not think that this represents postmenopausal bleeding, but more likely dysfunctional uterine bleeding. This fits with a picture of her other perimenopausal symptoms. I have ordered a pelvic ultrasound to assess the uterus, endometrium, and ovaries.     With regards to the atrophic vaginitis, we discussed the use of vaginal estrogen. This hasn't generally safe risk profile, and she is an appropriate candidate for use. She is interested in trying this. I sent a prescription for Estrace. She is interested potentially in using the Estring long-term.    Given her constellation of comorbidities and her family history of heart disease, systemic estrogen replacement would not be my first choice in treating her other symptoms. I have therefore counseled her regarding the use of Effexor and an attempt to relieve her hot flashes and night sweats, while also helping with her mood swings. She is interested in trying this. We will start a taper of 37.5 mg daily up to 75 mg daily after 2 weeks. She will let me know after the first month whether this is working, and I can renew her prescription for 1 year at that time.     If there are abnormalities on the ultrasound, she may need endometrial biopsy or hysteroscopy.    I will follow up with her as results become available.     Ela Alvarez MD  [facility]

## 2017-06-28 NOTE — NURSING NOTE
Pt called and informed that her scripts will be deferred to Dr. Low for tomorrow.  Corby WANG RN      Moved all the documentation to a refill encounter.  Corby WANG RN

## 2017-06-28 NOTE — TELEPHONE ENCOUNTER
Pt walked into clinic today to get her narcs refilled.  She wanted to know if should could get them refilled with out seeing Dr. Low.     I see pt had a no show in April but has received monthly refills since then.  No note of when Dr. Low wants to see pt again.     Oxycodone      Last Written Prescription Date:  5/23/17  Last Fill Quantity: 150,   # refills: 0  Last Office Visit with [facility], [facility] or [facility] prescribing provider: 3/327/2017  Future Office visit:         Next 5 appointments (look out 90 days)      Jun 28, 2017  1:30 PM CDT   Nurse Only with Ri Rn   [facility]     [address]  [facility] [address]   [phone]                Jun 28, 2017  2:00 PM CDT   Office Visit with Ela Alvarez MD   [facility]     [address]  [facility] [address]   [phone]                Jun 29, 2017  8:45 AM CDT   Return Visit with Milena Joseph NP   [facility]     [address]  [address]  [facility] [address] [phone]                      Routing refill request to provider for review/approval because:  Drug not on the [facility], [facility] or [facility] refill protocol or controlled substance     Valium      Last Written Prescription Date:  5/23/17  Last Fill Quantity: 120,   # refills: 0  Last Office Visit with [facility], [facility] or [facility] prescribing provider: 3/27/17  Future Office visit:         Next 5 appointments (look out 90 days)      Jun 28, 2017  1:30 PM CDT   Nurse Only with Ri Rn   [facility]     [address]  [facility] [address]   [phone]                Jun 28, 2017  2:00 PM CDT   Office Visit with Ela Alvarez MD   [facility]     [address]  [facility] [address] [phone]                 Jun 29, 2017  8:45 AM CDT   Return Visit with Milena Joseph NP   [facility]     [address]  [address]  [facility] [address] [phone]                   Routing refill request to provider for review/approval because:  Drug not on the [facility], [facility] or  Health refill protocol or controlled substance     Med pended  Pharmacy listed  Please advise  Corby WANG RN

## 2017-06-28 NOTE — MR AVS SNAPSHOT
After Visit Summary   6/28/2017    Swetha Patterson    MRN: [id number]           Patient Information     Date Of Birth          1970        Visit Information        Provider Department      6/28/2017 2:00 PM Ela Alvarez MD [facility]        Today's Diagnoses     Dysuria    -  1    Screening examination for venereal disease        DUB (dysfunctional uterine bleeding)        Menopausal syndrome (hot flashes)        Atrophic vaginitis           Follow-ups after your visit        Your next 10 appointments already scheduled     Jun 29, 2017  8:45 AM CDT   Return Visit with Milena Joseph NP   [facility] ([facility])    [address]  [address]  [facility] [address] [phone]            Jun 30, 2017 10:00 AM CDT   XR CERVIC/THORACIC TRANSFORAMINAL INJECTION with AMAN,  IMAGING NURSE, AUDI RUBIO   [facility] ([facility])    [address] [place] [address] [phone]           For nerve root injection, please send or bring copies of any MRIs or other scans you have had.  Bring a list of your current medicines to your exam. (Include vitamins, minerals and over-the-counter medicines.) Leave your valuables at home.  Plan to have someone drive you home afterward.  Stop taking the following medicines (but talk to your doctor first):   If you take blood thinners, you may need to stop taking them a few days before treatment. Talk to your doctor before stopping these medicines.Stop taking Coumadin (warfarin) 3 days before treatment. Restart the day after treatment.   If you take Plavix, Ticlid, Pletal or Persantine, please ask your doctor if you should stop these medicines. You may need extra tests on the morning of your scan. You may take your other medicines as normal.  Stop all food and drink (including water) 3 hours before your test or treatment.  Please tell the doctor:    If you are allergic to X-ray dye (contrast fluid).   If you may be pregnant.  Injections take about 30 to 45 minutes. Most people spend up to 2 hours in the clinic or hospital.  Please call the Imaging Department at your exam site with any questions            Jul 12, 2017 10:30 AM CDT   US PELVIC COMPLETE W TRANSVAGINAL with RIUS1   [facility]    [address]  [address]  [facility] [address] [phone]           Please bring a list of your medicines (including vitamins, minerals and over-the-counter drugs). Also, tell your doctor about any allergies you may have. Wear comfortable clothes and leave your valuables at home.  Adults: Drink six 8-ounce glasses of fluid one hour before your exam. Do NOT empty your bladder.  If you need to empty your bladder before your exam, try to release only a little bit of urine. Then, drink another 8oz glass of fluid.  Children: Children who are potty trained should drink at least 4 cups (32 oz) of liquid 45 minutes to one hour prior to the exam. The child s bladder must be full in order to achieve a diagnostic exam. If your child is very uncomfortable or has an urgent need to pee, please notify a technologist; they will try to find out how much longer the wait may be and provide instructions to help relieve the pressure. Occasionally it is medically necessary to insert a urinary catheter to fill the bladder.  Please call the Imaging Department at your exam site with any questions.            Aug 18, 2017 10:00 AM CDT   (Arrive by 9:30 AM)   NEW THROAT with Lis Talbert MD   [facility] ([facility])    [address] [address] [phone]           - This is a multidisciplinary care team visit with an ENT physician and may include a speech language pathologist. - It is important to know that if you are evaluated and/or  treated by both a physician and a speech pathologist during your visit, your billing will reflect the input that you receive from both providers as separate professionals. Although most insurance plans do cover these services, we encourage you to contact your insurance company prior to your visit to determine whether there are any coverage limitations that might affect you financially. - Billing/procedure codes that are frequently associated with visits to our clinic include (but are not limited to) the ones listed below. Most patients will not need all of these items, but it may be useful to ask your insurance company's patient . 73692: Flexible fiberoptic laryngoscopy, 20611: Laryngoscopy; flexible or rigid fiberoptic, with stroboscopy, 41746: Flexible endoscopic evaluation of swallowing, 18443: Evaluation of Voice and Resonance, 40916: Speech pathology treatment for voice, speech, communication, 33147: Speech pathology treatment for swallowing/oral function for feeding - If you have any concerns or questions, or if you would prefer not to have a speech pathologist involved in your visit, please contact our Clinic Coordinator at [phone], at least 24 hours prior to your appointment.              Future tests that were ordered for you today     Open Future Orders        Priority Expected Expires Ordered    US Pelvic Complete with Transvaginal Routine  6/28/2018 6/28/2017            Who to contact     If you have questions or need follow up information about today's clinic visit or your schedule please contact [facility] directly at [phone].  Normal or non-critical lab and imaging results will be communicated to you by MyChart, letter or phone within 4 business days after the clinic has received the results. If you do not hear from us within 7 days, please contact the clinic through MyChart or phone. If you have a critical or abnormal lab result, we will notify you  "by phone as soon as possible.  Submit refill requests through [email or web address] or call your pharmacy and they will forward the refill request to us. Please allow 3 business days for your refill to be completed.          Additional Information About Your Visit        MenuSpringharEtreasurebox Information     [email or web address] lets you send messages to your doctor, view your test results, renew your prescriptions, schedule appointments and more. To sign up, go to [email or web address] . Click on \"Log in\" on the left side of the screen, which will take you to the Welcome page. Then click on \"Sign up Now\" on the right side of the page.     You will be asked to enter the access code listed below, as well as some personal information. Please follow the directions to create your username and password.     Your access code is: [email or web address]  Expires: 2017  6:42 PM     Your access code will  in 90 days. If you need help or a new code, please call your [facility] clinic or [phone].        Care EveryWhere ID     This is your Care EveryWhere ID. This could be used by other organizations to access your [facility] medical records  [id number]        Your Vitals Were     Temperature Height Last Period BMI (Body Mass Index)          98  F (36.7  C) (Oral) 5' 4\" (1.626 m) 04/15/2016 32.08 kg/m2         Blood Pressure from Last 3 Encounters:   17 110/82   17 108/76   17 113/75    Weight from Last 3 Encounters:   17 186 lb 14.4 oz (84.8 kg)   17 184 lb (83.5 kg)   17 184 lb (83.5 kg)              We Performed the Following     Anti Treponema     CHLAMYDIA TRACHOMATIS PCR     Estradiol     Follicle stimulating hormone     Hepatitis B surface antigen     Hepatitis C antibody     HIV Antigen Antibody Combo     NEISSERIA GONORRHOEA PCR     UA reflex to Microscopic and Culture     Urine Culture Aerobic Bacterial     Wet prep          Today's Medication Changes          These changes are accurate as of: 17  4:33 PM.  If " you have any questions, ask your nurse or doctor.               Start taking these medicines.        Dose/Directions    estradiol 0.1 MG/GM cream   Commonly known as:  ESTRACE VAGINAL   Used for:  Atrophic vaginitis   Started by:  Ela Alvarez MD        1 gm pv nightly at bedtime for 2 weeks, then twice a week at bedtime for maintenance.   Quantity:  42.5 g   Refills:  6       venlafaxine 37.5 MG 24 hr capsule   Commonly known as:  EFFEXOR-XR   Used for:  Menopausal syndrome (hot flashes)   Started by:  Ela Alvarez MD        Take 1 capsule daily for 14 days, then take 2 capsules daily.   Quantity:  46 capsule   Refills:  0         Stop taking these medicines if you haven't already. Please contact your care team if you have questions.     tiZANidine 4 MG tablet   Commonly known as:  ZANAFLEX   Stopped by:  Ela Alvarez MD                Where to get your medicines      These medications were sent [address] [facility] [address] [place] [id number]     Phone:  [phone]     estradiol 0.1 MG/GM cream    venlafaxine 37.5 MG 24 hr capsule                Primary Care Provider Office Phone # Fax #    Roro Chawla [phone]       [place] [address] [place] [address]        Equal Access to Services     CHARITY MANDUJANO AH: Hadii skinny ledezma hadasho Sozbigniew, waaxda luqadaha, qaybta kaalmada adeegyada, caryl cowan . So [facility] [phone].    ATENCIÓN: Si habla español, tiene a vargas disposición servicios gratuitos de asistencia lingüística. Briseida al [phone].    We comply with applicable federal civil rights laws and [place] laws. We do not discriminate on the basis of race, color, national origin, age, disability sex, sexual orientation or gender identity.            Thank you!     Thank you for choosing [facility]  for your care. Our goal is  always to provide you with excellent care. Hearing back from our patients is one way we can continue to improve our services. Please take a few minutes to complete the written survey that you may receive in the mail after your visit with us. Thank you!             Your Updated Medication List - Protect others around you: Learn how to safely use, store and throw away your medicines at www.disposemymeds.org.          This list is accurate as of: 6/28/17  4:33 PM.  Always use your most recent med list.                   Brand Name Dispense Instructions for use Diagnosis    albuterol 108 (90 BASE) MCG/ACT Inhaler    VENTOLIN HFA    3 Inhaler    Inhale 2 puffs into the lungs every 6 hours    Shortness of breath       amLODIPine 10 MG tablet    NORVASC    90 tablet    Take 1 tablet (10 mg) by mouth daily    HTN, goal below 140/90       atorvastatin 80 MG tablet    LIPITOR    90 tablet    Take 1 tablet (80 mg) by mouth daily    Family history of ischemic heart disease, Hyperlipidemia LDL goal <130       budesonide-formoterol 160-4.5 MCG/ACT Inhaler    SYMBICORT    3 Inhaler    Inhale 2 puffs into the lungs 2 times daily    Uncomplicated asthma, unspecified asthma severity       cetirizine HCl 10 MG Caps     90 capsule    Take 1 capsule by mouth daily    Chronic rhinitis       chlorhexidine 4 % liquid    HIBICLENS    946 mL    Wash ab, groin, thighs daily in shower DO NOT PUT ON FACE    Hidradenitis suppurativa       clindamycin-benzoyl peroxide gel    BENZACLIN    100 g    Apply topically 2 times daily    Hidradenitis suppurativa       desonide 0.05 % cream    DESOWEN    60 g    Apply sparingly to affected area three times daily as needed.    Localized pruritus       diazepam 10 MG tablet    VALIUM    120 tablet    Take 1 tablet (10 mg) by mouth every 8 hours as needed for anxiety or sleep For sleep, anxiety, and muscle tension.    Chest wall pain, Muscle pain, Anxiety       doxycycline Monohydrate 100 MG Tabs     42  tablet    One pill by mouth twice daily    Hidradenitis suppurativa       escitalopram 10 MG tablet    LEXAPRO    90 tablet    Take 1 tablet (10 mg) by mouth daily    Anxiety       estradiol 0.1 MG/GM cream    ESTRACE VAGINAL    42.5 g    1 gm pv nightly at bedtime for 2 weeks, then twice a week at bedtime for maintenance.    Atrophic vaginitis       fluticasone 50 MCG/ACT spray    FLONASE    16 g    Spray 2 sprays into both nostrils daily    Severe chronic obstructive pulmonary disease (H)       furosemide 20 MG tablet    LASIX    90 tablet    Take 1 tablet (20 mg) by mouth daily as needed    Bilateral leg edema       hydrOXYzine 25 MG tablet    ATARAX    120 tablet    Take 1 tablet (25 mg) by mouth 3 times daily For anxiety    Anxiety       ipratropium - albuterol 0.5 mg/2.5 mg/3 mL 0.5-2.5 (3) MG/3ML neb solution    DUONEB    360 mL    Take 1 vial (3 mLs) by nebulization every 6 hours    Uncomplicated asthma, unspecified asthma severity       irbesartan 300 MG tablet    AVAPRO    90 tablet    Take 1 tablet (300 mg) by mouth daily    Essential hypertension with goal blood pressure less than 140/90       ketoconazole 2 % shampoo    NIZORAL    120 mL    Apply to the affected area and wash off after 5 minutes.    Localized pruritus       lisinopril 20 MG tablet    PRINIVIL/ZESTRIL    90 tablet    Take 1 tablet (20 mg) by mouth daily    Essential hypertension with goal blood pressure less than 140/90       montelukast 10 MG tablet    SINGULAIR    30 tablet    Take 1 tablet (10 mg) by mouth At Bedtime    Dust allergy, Severe chronic obstructive pulmonary disease (H)       NICORETTE 2 MG lozenge   Generic drug:  nicotine polacrilex      PLACE 1 LOZENGE INSIDE CHEEK Q HOUR PRF SMOKING CESSATION UTD        ondansetron 8 MG tablet    ZOFRAN    30 tablet    Take 0.5-1 tablets (4-8 mg) by mouth every 8 hours as needed for nausea    Nausea       order for DME     1 Device    Equipment being ordered: Nebulizer machine Length  of need-lifetime    Chronic lung disease, Cough, Severe persistent asthma with acute exacerbation       oxyCODONE 10 MG IR tablet    ROXICODONE    150 tablet    5 times daily as needed    Chest wall pain, Severe chronic obstructive pulmonary disease (H), Continuous opioid dependence (H)       pantoprazole 20 MG EC tablet    PROTONIX    90 tablet    Take 2 tablets (40 mg) by mouth every morning (before breakfast)    Gastroesophageal reflux disease without esophagitis       prochlorperazine 5 MG tablet    COMPAZINE    90 tablet    Take 1 tablet (5 mg) by mouth every 6 hours as needed for nausea or vomiting    Nausea       ranitidine 75 MG tablet    ZANTAC    90 tablet    Take 2 tablets (150 mg) by mouth daily    Uncomplicated asthma, unspecified asthma severity       tiotropium 18 MCG capsule    SPIRIVA HANDIHALER    30 capsule    Inhale contents of one capsule daily.    Other emphysema (H)       venlafaxine 37.5 MG 24 hr capsule    EFFEXOR-XR    46 capsule    Take 1 capsule daily for 14 days, then take 2 capsules daily.    Menopausal syndrome (hot flashes)       VITAMIN D (CHOLECALCIFEROL) PO      Take 2,000 Units by mouth daily

## 2017-06-28 NOTE — NURSING NOTE
Pt walked into clinic today to get her narcs refilled.  She wanted to know if should could get them refilled with out seeing Dr. Low.    I see pt had a no show in April but has received monthly refills since then.  No note of when Dr. Low wants to see pt again.    Oxycodone      Last Written Prescription Date:  5/23/17  Last Fill Quantity: 150,   # refills: 0  Last Office Visit with [facility], [facility] or [facility] prescribing provider: 3/327/2017  Future Office visit:    Next 5 appointments (look out 90 days)     Jun 28, 2017  1:30 PM CDT   Nurse Only with Ri Rn   [facility] ([facility])    [address]  [facility] [address]   [phone]            Jun 28, 2017  2:00 PM CDT   Office Visit with Ela Alvarez MD   [facility] ([facility])    [address]  [facility] [address]   [phone]            Jun 29, 2017  8:45 AM CDT   Return Visit with Milena Joseph NP   [facility] ([facility])    [address]  [address]  [facility] [address] [phone]                 Routing refill request to provider for review/approval because:  Drug not on the [facility], [facility] or [facility] refill protocol or controlled substance    Valium      Last Written Prescription Date:  5/23/17  Last Fill Quantity: 120,   # refills: 0  Last Office Visit with [facility], [facility] or [facility] prescribing provider: 3/27/17  Future Office visit:    Next 5 appointments (look out 90 days)     Jun 28, 2017  1:30 PM CDT   Nurse Only with Ri Rn   [facility] ([facility])    [address]  [facility] [address]   [phone]            Jun 28, 2017  2:00 PM CDT   Office Visit with Ela Alvarez MD   [facility] ([facility])    [address]  [facility] [address]   [phone]            Jun 29, 2017  8:45 AM CDT   Return Visit  with Milena Joseph NP   [facility]    [address]  [address]  [facility] [address] [phone]               Routing refill request to provider for review/approval because:  Drug not on the [facility], [facility] or  Health refill protocol or controlled substance    Med pended  Pharmacy listed  Please advise  Corby WANG RN

## 2017-06-29 ENCOUNTER — OFFICE VISIT (OUTPATIENT)
Dept: INTERNAL MEDICINE | Facility: CLINIC | Age: 47
End: 2017-06-29
Payer: MEDICARE

## 2017-06-29 ENCOUNTER — TELEPHONE (OUTPATIENT)
Dept: OBGYN | Facility: CLINIC | Age: 47
End: 2017-06-29

## 2017-06-29 ENCOUNTER — OFFICE VISIT (OUTPATIENT)
Dept: PSYCHIATRY | Facility: CLINIC | Age: 47
End: 2017-06-29
Payer: MEDICARE

## 2017-06-29 VITALS
OXYGEN SATURATION: 99 % | BODY MASS INDEX: 32.17 KG/M2 | TEMPERATURE: 98.2 F | DIASTOLIC BLOOD PRESSURE: 80 MMHG | SYSTOLIC BLOOD PRESSURE: 162 MMHG | HEIGHT: 64 IN | WEIGHT: 188.4 LBS | HEART RATE: 82 BPM

## 2017-06-29 VITALS
OXYGEN SATURATION: 97 % | HEIGHT: 64 IN | TEMPERATURE: 97.9 F | DIASTOLIC BLOOD PRESSURE: 60 MMHG | BODY MASS INDEX: 32.27 KG/M2 | WEIGHT: 189 LBS | HEART RATE: 85 BPM | SYSTOLIC BLOOD PRESSURE: 122 MMHG

## 2017-06-29 DIAGNOSIS — F11.20 CONTINUOUS OPIOID DEPENDENCE (H): Chronic | ICD-10-CM

## 2017-06-29 DIAGNOSIS — F41.9 ANXIETY: ICD-10-CM

## 2017-06-29 DIAGNOSIS — J44.9 SEVERE CHRONIC OBSTRUCTIVE PULMONARY DISEASE (H): Chronic | ICD-10-CM

## 2017-06-29 DIAGNOSIS — R07.89 CHEST WALL PAIN: ICD-10-CM

## 2017-06-29 DIAGNOSIS — M79.10 MUSCLE PAIN: ICD-10-CM

## 2017-06-29 DIAGNOSIS — G47.00 PERSISTENT INSOMNIA: Primary | ICD-10-CM

## 2017-06-29 LAB
BACTERIA SPEC CULT: NORMAL
C TRACH DNA SPEC QL NAA+PROBE: NORMAL
HBV SURFACE AG SERPL QL IA: NONREACTIVE
HCV AB SERPL QL IA: NORMAL
HIV 1+2 AB+HIV1 P24 AG SERPL QL IA: NORMAL
MICRO REPORT STATUS: NORMAL
N GONORRHOEA DNA SPEC QL NAA+PROBE: NORMAL
SPECIMEN SOURCE: NORMAL
T PALLIDUM IGG+IGM SER QL: NEGATIVE

## 2017-06-29 PROCEDURE — 99214 OFFICE O/P EST MOD 30 MIN: CPT | Performed by: INTERNAL MEDICINE

## 2017-06-29 PROCEDURE — 99214 OFFICE O/P EST MOD 30 MIN: CPT | Performed by: NURSE PRACTITIONER

## 2017-06-29 RX ORDER — DIAZEPAM 10 MG
10 TABLET ORAL EVERY 8 HOURS PRN
Qty: 90 TABLET | Refills: 0 | Status: SHIPPED | OUTPATIENT
Start: 2017-06-29 | End: 2017-07-27

## 2017-06-29 RX ORDER — DOXEPIN HYDROCHLORIDE 10 MG/1
10 CAPSULE ORAL AT BEDTIME
Qty: 30 CAPSULE | Refills: 1 | Status: ON HOLD | OUTPATIENT
Start: 2017-06-29 | End: 2018-02-09

## 2017-06-29 RX ORDER — ESCITALOPRAM OXALATE 20 MG/1
20 TABLET ORAL DAILY
Qty: 90 TABLET | Refills: 1 | Status: SHIPPED | OUTPATIENT
Start: 2017-06-29 | End: 2017-11-21

## 2017-06-29 RX ORDER — HYDROXYZINE HYDROCHLORIDE 50 MG/1
50 TABLET, FILM COATED ORAL 4 TIMES DAILY PRN
Qty: 120 TABLET | Refills: 1 | Status: SHIPPED | OUTPATIENT
Start: 2017-06-29 | End: 2017-08-25

## 2017-06-29 RX ORDER — OXYCODONE HYDROCHLORIDE 10 MG/1
TABLET ORAL
Qty: 150 TABLET | Refills: 0 | Status: SHIPPED | OUTPATIENT
Start: 2017-06-29 | End: 2017-07-27

## 2017-06-29 ASSESSMENT — ANXIETY QUESTIONNAIRES
7. FEELING AFRAID AS IF SOMETHING AWFUL MIGHT HAPPEN: NOT AT ALL
1. FEELING NERVOUS, ANXIOUS, OR ON EDGE: NEARLY EVERY DAY
6. BECOMING EASILY ANNOYED OR IRRITABLE: NEARLY EVERY DAY
5. BEING SO RESTLESS THAT IT IS HARD TO SIT STILL: NEARLY EVERY DAY
IF YOU CHECKED OFF ANY PROBLEMS ON THIS QUESTIONNAIRE, HOW DIFFICULT HAVE THESE PROBLEMS MADE IT FOR YOU TO DO YOUR WORK, TAKE CARE OF THINGS AT HOME, OR GET ALONG WITH OTHER PEOPLE: VERY DIFFICULT
GAD7 TOTAL SCORE: 15
3. WORRYING TOO MUCH ABOUT DIFFERENT THINGS: SEVERAL DAYS
2. NOT BEING ABLE TO STOP OR CONTROL WORRYING: MORE THAN HALF THE DAYS

## 2017-06-29 ASSESSMENT — PATIENT HEALTH QUESTIONNAIRE - PHQ9: 5. POOR APPETITE OR OVEREATING: NEARLY EVERY DAY

## 2017-06-29 NOTE — MR AVS SNAPSHOT
After Visit Summary   6/29/2017    Swetha Patterson    MRN: [id number]           Patient Information     Date Of Birth          1970        Visit Information        Provider Department      6/29/2017 8:45 AM Milena Joseph NP [facility]        Today's Diagnoses     Persistent insomnia    -  1    Anxiety          Care Instructions    Treatment Plan:    Continue Valium (diazepam) 10 mg 2-3 times per day for anxiety per Primary Care Provider.    Increase Lexapro (escitalopram) to 20 mg by mouth daily for anxiety and depression.     Continue Effexor (venlafaxine) XR 37.5 mg by mouth daily for 2 weeks, then increase to 75 mg by mouth daily for hot flashes, anxiety, and depression. May increase this dose as needed in the future.     Increase Vistaril/Atarax (hydroxyzine) 50 mg up to 4 times per day for anxiety.     Trial Sinequan (doxepin) 10 mg by mouth daily at bedtime for sleep.     Continue all other medications as reviewed per electronic medical record today.     All questions addressed. Education and counseling completed regarding risks and benefits of medications and psychotherapy options.    Safety plan was reviewed. To the Emergency Department as needed or call after hours crisis line at [phone] or [phone].     To schedule individual or family therapy, call [facility] at [phone].    Schedule an appointment with me in 6-8 weeks or sooner as needed. Call [facility] at [phone] to schedule.    Follow up with primary care provider as planned or for acute medical concerns.    Call the psychiatric nurse line with medication questions or concerns at [phone].    My Practice Policy was reviewed and signed: YES     MyChart may be used to communicate with your provider, but this is not intended to be used for emergencies.          Follow-ups after your visit        Follow-up notes from your care team     Return in about 6 weeks  (around 8/10/2017).      Your next 10 appointments already scheduled     Jun 29, 2017 12:40 PM CDT   SHORT with Roro Chawla MD   [facility] ([facility])    [address] [facility] [id number] [phone]            Jun 30, 2017 10:00 AM CDT   XR CERVIC/THORACIC TRANSFORAMINAL INJECTION with AMAN, AUDI IMAGING NURSE, AUDI MSJUNIE RUBIO   [facility] ([facility])    [address] [place] [id number] [phone]           For nerve root injection, please send or bring copies of any MRIs or other scans you have had.  Bring a list of your current medicines to your exam. (Include vitamins, minerals and over-the-counter medicines.) Leave your valuables at home.  Plan to have someone drive you home afterward.  Stop taking the following medicines (but talk to your doctor first):   If you take blood thinners, you may need to stop taking them a few days before treatment. Talk to your doctor before stopping these medicines.Stop taking Coumadin (warfarin) 3 days before treatment. Restart the day after treatment.   If you take Plavix, Ticlid, Pletal or Persantine, please ask your doctor if you should stop these medicines. You may need extra tests on the morning of your scan. You may take your other medicines as normal.  Stop all food and drink (including water) 3 hours before your test or treatment.  Please tell the doctor:   If you are allergic to X-ray dye (contrast fluid).   If you may be pregnant.  Injections take about 30 to 45 minutes. Most people spend up to 2 hours in the clinic or hospital.  Please call the Imaging Department at your exam site with any questions            Jul 12, 2017 10:30 AM CDT   US PELVIC COMPLETE W TRANSVAGINAL with RIUS1   [facility] ([facility])    [address] [facility] [address]  [address]  [facility] [id number] [phone]           Please bring a list  of your medicines (including vitamins, minerals and over-the-counter drugs). Also, tell your doctor about any allergies you may have. Wear comfortable clothes and leave your valuables at home.  Adults: Drink six 8-ounce glasses of fluid one hour before your exam. Do NOT empty your bladder.  If you need to empty your bladder before your exam, try to release only a little bit of urine. Then, drink another 8oz glass of fluid.  Children: Children who are potty trained should drink at least 4 cups (32 oz) of liquid 45 minutes to one hour prior to the exam. The child s bladder must be full in order to achieve a diagnostic exam. If your child is very uncomfortable or has an urgent need to pee, please notify a technologist; they will try to find out how much longer the wait may be and provide instructions to help relieve the pressure. Occasionally it is medically necessary to insert a urinary catheter to fill the bladder.  Please call the Imaging Department at your exam site with any questions.            Jul 27, 2017 12:00 PM CDT   SHORT with Roro Chawla MD   [facility] ([facility])    [address] [place] [address]   [phone]            Aug 18, 2017 10:00 AM CDT   (Arrive by 9:30 AM)   NEW THROAT with Lis Talbert MD   [facility] ([facility])    [address] [id number] [phone]           - This is a multidisciplinary care team visit with an ENT physician and may include a speech language pathologist. - It is important to know that if you are evaluated and/or treated by both a physician and a speech pathologist during your visit, your billing will reflect the input that you receive from both providers as separate professionals. Although most insurance plans do cover these services, we encourage you to contact your insurance company prior to your visit to  determine whether there are any coverage limitations that might affect you financially. - Billing/procedure codes that are frequently associated with visits to our clinic include (but are not limited to) the ones listed below. Most patients will not need all of these items, but it may be useful to ask your insurance company's patient . 42735: Flexible fiberoptic laryngoscopy, 39539: Laryngoscopy; flexible or rigid fiberoptic, with stroboscopy, 33373: Flexible endoscopic evaluation of swallowing, 27680: Evaluation of Voice and Resonance, 60356: Speech pathology treatment for voice, speech, communication, 90850: Speech pathology treatment for swallowing/oral function for feeding - If you have any concerns or questions, or if you would prefer not to have a speech pathologist involved in your visit, please contact our Clinic Coordinator at [phone], at least 24 hours prior to your appointment.              Future tests that were ordered for you today     Open Future Orders        Priority Expected Expires Ordered    US Pelvic Complete with Transvaginal Routine  6/28/2018 6/28/2017            Who to contact     If you have questions or need follow up information about today's clinic visit or your schedule please contact [facility] directly at [phone].  Normal or non-critical lab and imaging results will be communicated to you by MyChart, letter or phone within 4 business days after the clinic has received the results. If you do not hear from us within 7 days, please contact the clinic through "[employer]hart or phone. If you have a critical or abnormal lab result, we will notify you by phone as soon as possible.  Submit refill requests through pushd or call your pharmacy and they will forward the refill request to us. Please allow 3 business days for your refill to be completed.          Additional Information About Your Visit        "[employer] Knowledge Information     pushd lets you  "send messages to your doctor, view your test results, renew your prescriptions, schedule appointments and more. To sign up, go to [email or web address] . Click on \"Log in\" on the left side of the screen, which will take you to the Welcome page. Then click on \"Sign up Now\" on the right side of the page.     You will be asked to enter the access code listed below, as well as some personal information. Please follow the directions to create your username and password.     Your access code is: [email or web address]  Expires: 2017  6:42 PM     Your access code will  in 90 days. If you need help or a new code, please call your [place] clinic or [phone].        Care EveryWhere ID     This is your Care EveryWhere ID. This could be used by other organizations to access your [place] medical records  [id number]        Your Vitals Were     Pulse Temperature Height Last Period Pulse Oximetry BMI (Body Mass Index)    85 97.9  F (36.6  C) (Oral) 5' 4\" (1.626 m) 04/15/2016 97% 32.44 kg/m2       Blood Pressure from Last 3 Encounters:   17 122/60   17 110/82   17 108/76    Weight from Last 3 Encounters:   17 189 lb (85.7 kg)   17 186 lb 14.4 oz (84.8 kg)   17 184 lb (83.5 kg)              Today, you had the following     No orders found for display         Today's Medication Changes          These changes are accurate as of: 17  9:26 AM.  If you have any questions, ask your nurse or doctor.               Start taking these medicines.        Dose/Directions    doxepin 10 MG capsule   Commonly known as:  SINEquan   Used for:  Persistent insomnia   Started by:  Milena Joseph NP        Dose:  10 mg   Take 1 capsule (10 mg) by mouth At Bedtime   Quantity:  30 capsule   Refills:  1         These medicines have changed or have updated prescriptions.        Dose/Directions    escitalopram 20 MG tablet   Commonly known as:  LEXAPRO   This may have changed:    - medication strength  - " how much to take  - additional instructions   Used for:  Anxiety   Changed by:  Milena Joseph NP        Dose:  20 mg   Take 1 tablet (20 mg) by mouth daily Increased dose.   Quantity:  90 tablet   Refills:  1       hydrOXYzine 50 MG tablet   Commonly known as:  ATARAX   This may have changed:    - medication strength  - how much to take  - when to take this  - reasons to take this  - additional instructions   Used for:  Anxiety   Changed by:  Milena Joseph NP        Dose:  50 mg   Take 1 tablet (50 mg) by mouth 4 times daily as needed for anxiety Increased dose. For anxiety   Quantity:  120 tablet   Refills:  1            Where to get your medicines      These medications were sent to [facility] [place] [address] [place] [address]     Phone:  [phone]     doxepin 10 MG capsule    escitalopram 20 MG tablet    hydrOXYzine 50 MG tablet                Primary Care Provider Office Phone # Fax #    Roro Chawla [phone]       [facility] [address] [place] [address]        Equal Access to Services     DEBBIE MANDUJANO : Hadii aad ku hadasho Soomaali, waaxda luqadaha, qaybta kaalmada adeegyada, caryl wilkinsin hayaan susanne cowan . So [facility] [phone].    ATENCIÓN: Si habla español, tiene a vargas disposición servicios gratuitos de asistencia lingüística. [facility] [phone].    We comply with applicable federal civil rights laws and [place] laws. We do not discriminate on the basis of race, color, national origin, age, disability sex, sexual orientation or gender identity.            Thank you!     Thank you for choosing [facility]  for your care. Our goal is always to provide you with excellent care. Hearing back from our patients is one way we can continue to improve our services. Please take a few minutes to complete the written survey that you may receive in the  mail after your visit with us. Thank you!             Your Updated Medication List - Protect others around you: Learn how to safely use, store and throw away your medicines at www.disposemymeds.org.          This list is accurate as of: 6/29/17  9:26 AM.  Always use your most recent med list.                   Brand Name Dispense Instructions for use Diagnosis    albuterol 108 (90 BASE) MCG/ACT Inhaler    VENTOLIN HFA    3 Inhaler    Inhale 2 puffs into the lungs every 6 hours    Shortness of breath       amLODIPine 10 MG tablet    NORVASC    90 tablet    Take 1 tablet (10 mg) by mouth daily    HTN, goal below 140/90       atorvastatin 80 MG tablet    LIPITOR    90 tablet    Take 1 tablet (80 mg) by mouth daily    Family history of ischemic heart disease, Hyperlipidemia LDL goal <130       budesonide-formoterol 160-4.5 MCG/ACT Inhaler    SYMBICORT    3 Inhaler    Inhale 2 puffs into the lungs 2 times daily    Uncomplicated asthma, unspecified asthma severity       cetirizine HCl 10 MG Caps     90 capsule    Take 1 capsule by mouth daily    Chronic rhinitis       chlorhexidine 4 % liquid    HIBICLENS    946 mL    Wash ab, groin, thighs daily in shower DO NOT PUT ON FACE    Hidradenitis suppurativa       clindamycin-benzoyl peroxide gel    BENZACLIN    100 g    Apply topically 2 times daily    Hidradenitis suppurativa       desonide 0.05 % cream    DESOWEN    60 g    Apply sparingly to affected area three times daily as needed.    Localized pruritus       diazepam 10 MG tablet    VALIUM    120 tablet    Take 1 tablet (10 mg) by mouth every 8 hours as needed for anxiety or sleep For sleep, anxiety, and muscle tension.    Chest wall pain, Muscle pain, Anxiety       doxepin 10 MG capsule    SINEquan    30 capsule    Take 1 capsule (10 mg) by mouth At Bedtime    Persistent insomnia       doxycycline Monohydrate 100 MG Tabs     42 tablet    One pill by mouth twice daily    Hidradenitis suppurativa       escitalopram 20 MG  tablet    LEXAPRO    90 tablet    Take 1 tablet (20 mg) by mouth daily Increased dose.    Anxiety       estradiol 0.1 MG/GM cream    ESTRACE VAGINAL    42.5 g    1 gm pv nightly at bedtime for 2 weeks, then twice a week at bedtime for maintenance.    Atrophic vaginitis       fluticasone 50 MCG/ACT spray    FLONASE    16 g    Spray 2 sprays into both nostrils daily    Severe chronic obstructive pulmonary disease (H)       furosemide 20 MG tablet    LASIX    90 tablet    Take 1 tablet (20 mg) by mouth daily as needed    Bilateral leg edema       hydrOXYzine 50 MG tablet    ATARAX    120 tablet    Take 1 tablet (50 mg) by mouth 4 times daily as needed for anxiety Increased dose. For anxiety    Anxiety       ipratropium - albuterol 0.5 mg/2.5 mg/3 mL 0.5-2.5 (3) MG/3ML neb solution    DUONEB    360 mL    Take 1 vial (3 mLs) by nebulization every 6 hours    Uncomplicated asthma, unspecified asthma severity       irbesartan 300 MG tablet    AVAPRO    90 tablet    Take 1 tablet (300 mg) by mouth daily    Essential hypertension with goal blood pressure less than 140/90       ketoconazole 2 % shampoo    NIZORAL    120 mL    Apply to the affected area and wash off after 5 minutes.    Localized pruritus       lisinopril 20 MG tablet    PRINIVIL/ZESTRIL    90 tablet    Take 1 tablet (20 mg) by mouth daily    Essential hypertension with goal blood pressure less than 140/90       montelukast 10 MG tablet    SINGULAIR    30 tablet    Take 1 tablet (10 mg) by mouth At Bedtime    Dust allergy, Severe chronic obstructive pulmonary disease (H)       NICORETTE 2 MG lozenge   Generic drug:  nicotine polacrilex      PLACE 1 LOZENGE INSIDE CHEEK Q HOUR PRF SMOKING CESSATION UTD        ondansetron 8 MG tablet    ZOFRAN    30 tablet    Take 0.5-1 tablets (4-8 mg) by mouth every 8 hours as needed for nausea    Nausea       order for DME     1 Device    Equipment being ordered: Nebulizer machine Length of need-lifetime    Chronic lung  disease, Cough, Severe persistent asthma with acute exacerbation       oxyCODONE 10 MG IR tablet    ROXICODONE    150 tablet    5 times daily as needed    Chest wall pain, Severe chronic obstructive pulmonary disease (H), Continuous opioid dependence (H)       pantoprazole 20 MG EC tablet    PROTONIX    90 tablet    Take 2 tablets (40 mg) by mouth every morning (before breakfast)    Gastroesophageal reflux disease without esophagitis       prochlorperazine 5 MG tablet    COMPAZINE    90 tablet    Take 1 tablet (5 mg) by mouth every 6 hours as needed for nausea or vomiting    Nausea       ranitidine 75 MG tablet    ZANTAC    90 tablet    Take 2 tablets (150 mg) by mouth daily    Uncomplicated asthma, unspecified asthma severity       tiotropium 18 MCG capsule    SPIRIVA HANDIHALER    30 capsule    Inhale contents of one capsule daily.    Other emphysema (H)       venlafaxine 37.5 MG 24 hr capsule    EFFEXOR-XR    46 capsule    Take 1 capsule daily for 14 days, then take 2 capsules daily.    Menopausal syndrome (hot flashes)       VITAMIN D (CHOLECALCIFEROL) PO      Take 2,000 Units by mouth daily

## 2017-06-29 NOTE — PATIENT INSTRUCTIONS
Treatment Plan:    Continue Valium (diazepam) 10 mg 2-3 times per day for anxiety per Primary Care Provider.    Increase Lexapro (escitalopram) to 20 mg by mouth daily for anxiety and depression.     Continue Effexor (venlafaxine) XR 37.5 mg by mouth daily for 2 weeks, then increase to 75 mg by mouth daily for hot flashes, anxiety, and depression. May increase this dose as needed in the future.     Increase Vistaril/Atarax (hydroxyzine) 50 mg up to 4 times per day for anxiety.     Trial Sinequan (doxepin) 10 mg by mouth daily at bedtime for sleep.     Continue all other medications as reviewed per electronic medical record today.     All questions addressed. Education and counseling completed regarding risks and benefits of medications and psychotherapy options.    Safety plan was reviewed. To the Emergency Department as needed or call after hours crisis line at [phone] or [phone].     To schedule individual or family therapy, call [facility] at [phone].    Schedule an appointment with me in 6-8 weeks or sooner as needed. Call [facility] at [phone] to schedule.    Follow up with primary care provider as planned or for acute medical concerns.    Call the psychiatric nurse line with medication questions or concerns at [phone].    My Practice Policy was reviewed and signed: YES     MyChart may be used to communicate with your provider, but this is not intended to be used for emergencies.

## 2017-06-29 NOTE — TELEPHONE ENCOUNTER
"    Our agreement was she was coming for f/u appointment once back to [place].    \"She wants to go back to [place] on March 29 and coming back on  May 28   \"     She hasn't come not appointment area made     I already gave a refill on 5/28   "

## 2017-06-29 NOTE — PROGRESS NOTES
ASSESSMENT & PLAN:                                                      (F11.20) Continuous opioid dependence ([facility]) opioid for chr cough and chest pain ^^^^  Comment:   Plan: oxyCODONE (ROXICODONE) 10 MG IR tablet            (J44.9) Chr Lung Disease - managed by the pulm dept ([facility])^^^  Comment: possible asthma / vocal cord nodules   Plan: oxyCODONE (ROXICODONE) 10 MG IR tablet            (R07.89) Chest wall pain  Comment:   Plan: diazepam (VALIUM) 10 MG tablet, oxyCODONE         (ROXICODONE) 10 MG IR tablet            (M79.1) Muscle pain  Comment:   Plan: diazepam (VALIUM) 10 MG tablet            (F41.9) Anxiety  Comment: Not controlled Recent stress at the place she leaves  Plan: diazepam (VALIUM) 10 MG tablet             Chief Complaint:                                                        Follow up chronic medical problems      SUBJECTIVE:                                                    History of present illness     Anxiety is not controlled   -- saw Milena Joseph   -- reviewed Milena's tc plan: Treatment Plan: Continue Valium (diazepam) 10 mg 2-3 times per day for anxiety per Primary Care Provider. Increase Lexapro (escitalopram) to 20 mg by mouth daily for anxiety and depression.     Neck pain  -- scheduled for epidural injection tomorrow     Cough / chronic cough  -- she has tried to move to [place] but she found out that the coughs got worse there  --  Asthma with s/p bronchial thermoplasty but no benefit She coughs frequently with sec chest pain. On chronic Oxycodone for pain and cough    ROS:                                                      ROS: negative for fever, chills, chest pain, shortness of breath, vomiting, abdominal pain, leg swelling pos for cough and wheezes    A 10-point review of systems was obtained.  Those pertinent are above and in the in the Subjective section.  The rest of the systems are negative.        OBJECTIVE:                                                    Physical Exam  :    Blood pressure 162/80, pulse 82, temperature 98.2  F (36.8  C), temperature source Oral, weight 188 lb 6.4 oz (85.5 kg), last menstrual period 04/15/2016, SpO2 99 %, not currently breastfeeding.   NAD, appears comfortable  Skin: no rashes   Neck: supple, no JVD, No thyroidmegaly. Lymph nodes nonpalpable cervical and supraclavicular.  Chest: clear to auscultation bilaterally, good respiratory effort. Less cough today   Heart: S1 S2, RRR, no mgr appreciated  Abdomen: soft, not tender,   Extremities: no edema,   Neurologic: A, Ox3, no focal signs appreciated    PMHx: reviewed  Past Medical History:   Diagnosis Date     Anxiety state, unspecified      Arthritis     right hip arthritis     Asthma      Chronic pain     back pain from cyst     Contact dermatitis and other eczema, due to unspecified cause      COPD (chronic obstructive pulmonary disease) (H)      Depressive disorder, not elsewhere classified      Emphysema with chronic bronchitis (H)      Esophageal reflux      Family history of ischemic heart disease      Gastro-oesophageal reflux disease      HTN, goal below 140/90      Hyperlipidemia LDL goal <130      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)      PONV (postoperative nausea and vomiting)       PSHx: reviewed  Past Surgical History:   Procedure Laterality Date     ARTHROSCOPY SHOULDER DECOMPRESSION Left 10/21/2015    Procedure: ARTHROSCOPY SHOULDER DECOMPRESSION;  Surgeon: Julien Milian MD;  Location: [facility] OR     BRONCHIAL THERMOPLASTY N/A 11/14/2014    Procedure: BRONCHIAL THERMOPLASTY;  Surgeon: Ward Whitaker MD;  Location: [facility]     BRONCHIAL THERMOPLASTY N/A 12/19/2014    Procedure: BRONCHIAL THERMOPLASTY;  Surgeon: Ward Whitaker MD;  Location: [facility] OR     BRONCHIAL THERMOPLASTY N/A 2/6/2015    Procedure: BRONCHIAL THERMOPLASTY;  Surgeon: Ward Whitaker MD;  Location: [facility] OR     C APPENDECTOMY  at age 17     EXCISE NODE MEDIASTINAL  4/26/2013    Procedure: EXCISE NODE  MEDIASTINAL;;  Surgeon: Av Peña MD;  Location:  OR     THORACOSCOPY  4/26/2013    Procedure: THORACOSCOPY;  LEFT VIDEO ASSISTED THORACOSCOPY, RESECTION OF POSTERIOR MEDIASTINAL MASS;  Surgeon: Av Peña MD;  Location:  OR     TONSILLECTOMY  as a kid        Meds: reviewed  Current Outpatient Prescriptions   Medication Sig Dispense Refill     hydrOXYzine (ATARAX) 50 MG tablet Take 1 tablet (50 mg) by mouth 4 times daily as needed for anxiety Increased dose. For anxiety 120 tablet 1     escitalopram (LEXAPRO) 20 MG tablet Take 1 tablet (20 mg) by mouth daily Increased dose. 90 tablet 1     doxepin (SINEQUAN) 10 MG capsule Take 1 capsule (10 mg) by mouth At Bedtime 30 capsule 1     venlafaxine (EFFEXOR-XR) 37.5 MG 24 hr capsule Take 1 capsule daily for 14 days, then take 2 capsules daily. 46 capsule 0     estradiol (ESTRACE VAGINAL) 0.1 MG/GM cream 1 gm pv nightly at bedtime for 2 weeks, then twice a week at bedtime for maintenance. 42.5 g 6     doxycycline Monohydrate 100 MG TABS One pill by mouth twice daily 42 tablet 0     diazepam (VALIUM) 10 MG tablet Take 1 tablet (10 mg) by mouth every 8 hours as needed for anxiety or sleep For sleep, anxiety, and muscle tension. 120 tablet 0     oxyCODONE (ROXICODONE) 10 MG IR tablet 5 times daily as needed 150 tablet 0     ondansetron (ZOFRAN) 8 MG tablet Take 0.5-1 tablets (4-8 mg) by mouth every 8 hours as needed for nausea 30 tablet 1     fluticasone (FLONASE) 50 MCG/ACT spray Spray 2 sprays into both nostrils daily 16 g 11     ranitidine (ZANTAC) 75 MG tablet Take 2 tablets (150 mg) by mouth daily 90 tablet 1     cetirizine HCl 10 MG CAPS Take 1 capsule by mouth daily 90 capsule 3     atorvastatin (LIPITOR) 80 MG tablet Take 1 tablet (80 mg) by mouth daily 90 tablet 1     amLODIPine (NORVASC) 10 MG tablet Take 1 tablet (10 mg) by mouth daily 90 tablet 1     lisinopril (PRINIVIL/ZESTRIL) 20 MG tablet Take 1 tablet (20 mg) by mouth daily 90  tablet 1     irbesartan (AVAPRO) 300 MG tablet Take 1 tablet (300 mg) by mouth daily 90 tablet 1     montelukast (SINGULAIR) 10 MG tablet Take 1 tablet (10 mg) by mouth At Bedtime 30 tablet 1     furosemide (LASIX) 20 MG tablet Take 1 tablet (20 mg) by mouth daily as needed 90 tablet 1     NICORETTE 2 MG lozenge PLACE 1 LOZENGE INSIDE CHEEK Q HOUR PRF SMOKING CESSATION UTD  1     budesonide-formoterol (SYMBICORT) 160-4.5 MCG/ACT Inhaler Inhale 2 puffs into the lungs 2 times daily 3 Inhaler 3     ipratropium - albuterol 0.5 mg/2.5 mg/3 mL (DUONEB) 0.5-2.5 (3) MG/3ML neb solution Take 1 vial (3 mLs) by nebulization every 6 hours 360 mL 0     tiotropium (SPIRIVA HANDIHALER) 18 MCG capsule Inhale contents of one capsule daily. 30 capsule 1     albuterol (VENTOLIN HFA) 108 (90 BASE) MCG/ACT Inhaler Inhale 2 puffs into the lungs every 6 hours 3 Inhaler 3     ketoconazole (NIZORAL) 2 % shampoo Apply to the affected area and wash off after 5 minutes. 120 mL 1     desonide (DESOWEN) 0.05 % cream Apply sparingly to affected area three times daily as needed. 60 g 0     pantoprazole (PROTONIX) 20 MG tablet Take 2 tablets (40 mg) by mouth every morning (before breakfast) 90 tablet 3     prochlorperazine (COMPAZINE) 5 MG tablet Take 1 tablet (5 mg) by mouth every 6 hours as needed for nausea or vomiting 90 tablet 0     order for DME Equipment being ordered: Nebulizer machine  Length of need-lifetime 1 Device 0     clindamycin-benzoyl peroxide (BENZACLIN) gel Apply topically 2 times daily 100 g 3     chlorhexidine (HIBICLENS) 4 % external liquid Wash ab, groin, thighs daily in shower DO NOT PUT ON FACE 946 mL 3     VITAMIN D, CHOLECALCIFEROL, PO Take 2,000 Units by mouth daily       [DISCONTINUED] escitalopram (LEXAPRO) 10 MG tablet Take 1 tablet (10 mg) by mouth daily 90 tablet 1       Soc Hx: reviewed  Fam Hx: reviewed          Roro Low MD  Internal Medicine

## 2017-06-29 NOTE — TELEPHONE ENCOUNTER
Pt called in to discuss the lab results from yesterday.  Dr. Alvarez states that all labs are normal, but we went 1 by 1.    Pt was relieved that all was normal.    Corby WANG RN

## 2017-06-29 NOTE — PROGRESS NOTES
"    Outpatient Psychiatric Progress Note    Name: Swetha Patterson   : 1970                    Primary Care Provider: Roro Chawla MD - last visit 2017  Therapist: None    PHQ-9 scores:  PHQ-9 SCORE 10/28/2016 2016 2017   Total Score 13 12 12       GIDEON-7 scores:  GIDEON-7 SCORE 10/28/2016 2016 2017   Total Score 16 12 15       Patient Identification:  Patient is a 47 year old year old, single  White American female  who presents for return visit with me.  Patient is currently disabled. Patient attended the session alone. Patient prefers to be called: \"Swetha\"    Interim History:    I last saw Swetha Patterson for outpatient psychiatry Return Visit on 2016.     During that appointment, we Discontinue Klonopin (clonazepam).    Continue Valium (diazepam) 10 mg two times per day for anxiety. FUTURE REFILLS WILL COME FROM PCP.    Start Lexapro (escitalopram) 5 mg by mouth daily for one week then increase to 10 mg by mouth daily for anxiety.      Current medications include:   Current Outpatient Prescriptions   Medication Sig     venlafaxine (EFFEXOR-XR) 37.5 MG 24 hr capsule Take 1 capsule daily for 14 days, then take 2 capsules daily.     estradiol (ESTRACE VAGINAL) 0.1 MG/GM cream 1 gm pv nightly at bedtime for 2 weeks, then twice a week at bedtime for maintenance.     doxycycline Monohydrate 100 MG TABS One pill by mouth twice daily     diazepam (VALIUM) 10 MG tablet Take 1 tablet (10 mg) by mouth every 8 hours as needed for anxiety or sleep For sleep, anxiety, and muscle tension.     oxyCODONE (ROXICODONE) 10 MG IR tablet 5 times daily as needed     ondansetron (ZOFRAN) 8 MG tablet Take 0.5-1 tablets (4-8 mg) by mouth every 8 hours as needed for nausea     fluticasone (FLONASE) 50 MCG/ACT spray Spray 2 sprays into both nostrils daily     cetirizine HCl 10 MG CAPS Take 1 capsule by mouth daily     atorvastatin (LIPITOR) 80 MG tablet Take 1 tablet (80 mg) by " mouth daily     escitalopram (LEXAPRO) 10 MG tablet Take 1 tablet (10 mg) by mouth daily     amLODIPine (NORVASC) 10 MG tablet Take 1 tablet (10 mg) by mouth daily     lisinopril (PRINIVIL/ZESTRIL) 20 MG tablet Take 1 tablet (20 mg) by mouth daily     irbesartan (AVAPRO) 300 MG tablet Take 1 tablet (300 mg) by mouth daily     montelukast (SINGULAIR) 10 MG tablet Take 1 tablet (10 mg) by mouth At Bedtime     furosemide (LASIX) 20 MG tablet Take 1 tablet (20 mg) by mouth daily as needed     budesonide-formoterol (SYMBICORT) 160-4.5 MCG/ACT Inhaler Inhale 2 puffs into the lungs 2 times daily     ipratropium - albuterol 0.5 mg/2.5 mg/3 mL (DUONEB) 0.5-2.5 (3) MG/3ML neb solution Take 1 vial (3 mLs) by nebulization every 6 hours     tiotropium (SPIRIVA HANDIHALER) 18 MCG capsule Inhale contents of one capsule daily.     albuterol (VENTOLIN HFA) 108 (90 BASE) MCG/ACT Inhaler Inhale 2 puffs into the lungs every 6 hours     ketoconazole (NIZORAL) 2 % shampoo Apply to the affected area and wash off after 5 minutes.     desonide (DESOWEN) 0.05 % cream Apply sparingly to affected area three times daily as needed.     pantoprazole (PROTONIX) 20 MG tablet Take 2 tablets (40 mg) by mouth every morning (before breakfast)     prochlorperazine (COMPAZINE) 5 MG tablet Take 1 tablet (5 mg) by mouth every 6 hours as needed for nausea or vomiting     order for DME Equipment being ordered: Nebulizer machine  Length of need-lifetime     clindamycin-benzoyl peroxide (BENZACLIN) gel Apply topically 2 times daily     chlorhexidine (HIBICLENS) 4 % external liquid Wash ab, groin, thighs daily in shower DO NOT PUT ON FACE     VITAMIN D, CHOLECALCIFEROL, PO Take 2,000 Units by mouth daily     hydrOXYzine (ATARAX) 25 MG tablet Take 1 tablet (25 mg) by mouth 3 times daily For anxiety (Patient not taking: Reported on 6/29/2017)     ranitidine (ZANTAC) 75 MG tablet Take 2 tablets (150 mg) by mouth daily     NICORETTE 2 MG lozenge PLACE 1 LOZENGE  "INSIDE CHEEK Q HOUR PRF SMOKING CESSATION UTD     No current facility-administered medications for this visit.        The [place] Prescription Monitoring Program has been reviewed and there are no concerns about diversionary activity for controlled substances at this time.  Valium (diazepam) 10 mg, 120 tablets filled 5/30/2017 from Primary Care Provider.     I was able to review most recent Primary Care Provider, specialty provider, and therapy visit notes that I have access to. Patient did not follow up as planned and has been in contact with clinic asking for medication for anxiety because \"Valium (diazepam) is not working\". Vistaril/Atarax (hydroxyzine) was added from Primary Care Provider and Effexor (venlafaxine) was added from her OB yesterday. Patient has not started the Effexor (venlafaxine).    Patient reports that she has been in [place] all winter and came home last month. She does not have a therapist. She reports much anxiety and much psychosocial stress.  She has a restraining order out again a man who is apparently following her.     Swetha Patterson reports mood has been: \"I'm a wreck\"  Anxiety has been: \"higher\" Patient reports she briefly took Vistaril/Atarax (hydroxyzine) 50 mg 2 times per day and helped some, but then stopped working for her. She continues to take Valium (diazepam) 10 mg  Reports that her medications are not working for her.   Reports that it is hard to focus due to symptoms.   Sleep has been: \"hard to sleep through the night\" awake due to pain.  PHQ9 and GAD7 scores were reviewed today.   Medication side effects: Denies  Current stressors include: Symptoms, Medical Symptoms/Pain  Coping mechanisms and supports include: None    Past medication trials include but are not limited to:   Neurontin (gabapentin) \"rash\"  Ativan (lorazepam)   Wellbutrin (buproprion)   Xanax (alprazolam)  Valium (diazepam)  Klonopin (clonazepam)   History of Seroquel (quetiapine) per her report \"I gained " "weight\"  Chantix   Lyrica \"did not work\"  Celexa (citalopram)  Elavil (amitriptyline) history for antidepressant- denies use for help with sleep and pain  Buspar  Ambien   Lunesta   trazodone gives \"leg spasms\" and twitches in legs   Vistaril/Atarax (hydroxyzine) \"made me feel groggy\".   No melatonin trials.   \"Face swells up\" when takes Neurontin (gabapentin).   Zyprexa (olanzapine) and Abilify (aripriprazole) for \"depression\"  Lexapro (escitalopram)  Effexor (venlafaxine)   Celexa (citalopram)     Past Medical History:   Diagnosis Date     Anxiety state, unspecified      Arthritis     right hip arthritis     Asthma      Chronic pain     back pain from cyst     Contact dermatitis and other eczema, due to unspecified cause      COPD (chronic obstructive pulmonary disease) (H)      Depressive disorder, not elsewhere classified      Emphysema with chronic bronchitis (H)      Esophageal reflux      Family history of ischemic heart disease      Gastro-oesophageal reflux disease      HTN, goal below 140/90      Hyperlipidemia LDL goal <130      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)      PONV (postoperative nausea and vomiting)       has a past medical history of Anxiety state, unspecified; Arthritis; Asthma; Chronic pain; Contact dermatitis and other eczema, due to unspecified cause; COPD (chronic obstructive pulmonary disease) (H); Depressive disorder, not elsewhere classified; Emphysema with chronic bronchitis (H); Esophageal reflux; Family history of ischemic heart disease; Gastro-oesophageal reflux disease; HTN, goal below 140/90; Hyperlipidemia LDL goal <130; 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); and PONV (postoperative nausea and vomiting). She also has no past medical history of Basal cell carcinoma; Coagulation disorder (H); Difficult intubation; Malignant hyperthermia; Malignant melanoma (H); Sleep apnea; Squamous cell carcinoma; or Thrombosis of leg.    Social History:  Current Living situation:  " "[place], [place] with Roommate and pets . Discussed safety plan at home due to recent stalker.   Employment: disabled   Current use of drugs or alcohol: Denies   Tobacco use: History and quit with Chantix  Caffeine: No  Recovery Programming Involvement: None    Vital Signs:   /60 (BP Location: Right arm, Patient Position: Chair, Cuff Size: Adult Large)  Pulse 85  Temp 97.9  F (36.6  C) (Oral)  Ht 5' 4\" (1.626 m)  Wt 189 lb (85.7 kg)  LMP 04/15/2016  SpO2 97%  BMI 32.44 kg/m2    Labs:  Most recent laboratory results reviewed and the pertinent results include:   Office Visit on 06/28/2017   Component Date Value Ref Range Status     Color Urine 06/28/2017 Yellow   Final     Appearance Urine 06/28/2017 Clear   Final     Glucose Urine 06/28/2017 Negative  NEG mg/dL Final     Bilirubin Urine 06/28/2017 Negative  NEG Final     Ketones Urine 06/28/2017 Negative  NEG mg/dL Final     Specific Gravity Urine 06/28/2017 1.010  1.003 - 1.035 Final     Blood Urine 06/28/2017 Negative  NEG Final     pH Urine 06/28/2017 5.5  5.0 - 7.0 pH Final     Protein Albumin Urine 06/28/2017 Negative  NEG mg/dL Final     Urobilinogen Urine 06/28/2017 0.2  0.2 - 1.0 EU/dL Final     Nitrite Urine 06/28/2017 Negative  NEG Final     Leukocyte Esterase Urine 06/28/2017 Negative  NEG Final     Source 06/28/2017 Midstream Urine   Final     FSH 06/28/2017 42.5  IU/L Final    Comment: FSH Reference Range   Female: Follicular      2.5-10.2           Mid-cycle       3.4-33.4           Luteal          1.5-9.1           Postmenopausal  23.0-116.3       Estradiol 06/28/2017 15  pg/mL Final    Comment: Estradiol reference ranges for pre-menopausal   Follicular     pg/mL   Mid-cycle    pg/mL   Luteal          pg/mL       Specimen Description 06/28/2017 Vagina   Final     Wet Prep 06/28/2017    Final                    Value:No Trichomonas seen  No yeast seen  No clue cells seen       Micro Report Status 06/28/2017 FINAL 06/28/2017   " Final     Patient did not get vitamin D level drawn as ordered at first visit in October. Also recommend Urine Drug Screen    Review of Systems:  10 systems (general, cardiovascular, respiratory, eyes, ENT, endocrine, GI, , M/S, neurological) were reviewed. Most pertinent finding(s) is/are: night sweats, fatigue, chronic lung pain, neck pain, and shortness of breath, no coughing again today in clinic during our visit. The remaining systems are all unremarkable.    Mental Status Examination:  Appearance:  awake, alert, adequately groomed, appeared stated age, wearing makeup, alone and mildly obese  Attitude:  evasive, guarded and somewhat cooperative    Eye Contact:  adequate  Gait and Station: Normal, No assistive Devices used and No dizziness or falls  Psychomotor Behavior:  no evidence of tardive dyskinesia, dystonia, or tics  Oriented to:  time, person, and place  Attention Span and Concentration:  adequate  but notes trouble. Continue to monitor.  Speech:  clear, coherent, regular rate, regular rhythm and fluent, pressured speech  Language: Able to read and write  Mood:  anxious  Affect:  mood congruent, irritable, labile and guarded  Associations:  no loose associations  Thought Process:  logical, linear, goal oriented and rigid  Thought Content:  no evidence of suicidal ideation or homicidal ideation, no evidence of psychotic thought, no auditory hallucinations present and no visual hallucinations present  Recent and Remote Memory:  intact. Not formally assessed. No amnesia.   Fund of Knowledge: low-normal  Insight:  limited  Judgment:  limited  Impulse Control:  limited     Suicide Risk Assessment:  Today Swetha Patterson reports ongoing psychosocial stress that greatly affects her anxiety. In addition, there are notable risk factors for self-harm, including age, single status, anxiety and comorbid medical condition of pain and chronic cough. However, risk is mitigated by commitment to family,  spiritual/Christian beliefs, absence of past attempts, ability to volunteer a safety plan, history of seeking help when needed, future oriented, no access to firearms or weapons, denies suicidal intent or plan, no family history of suicide and denies homicidal ideation, intent, or plan. Therefore, based on all available evidence including the factors cited above, Swetha Patterson does not appear to be at imminent risk for self-harm, does not meet criteria for a 72-hr hold, and therefore remains appropriate for ongoing outpatient level of care.  A thorough assessment of risk factors related to suicide and self-harm have been reviewed and are noted above. The patient convincingly denies suicidality on several occasions. Local community safety resources printed and reviewed for patient to use if needed. There was no deceit detected, and the patient presented in a manner that was believable.      DSM5 Diagnosis:  296.32 Major Depressive Disorder, Recurrent Episode, Moderate With anxious distress  300.02 (F41.1) Generalized Anxiety Disorder                        Rule out Panic Disorder  Borderline Personality Disorder (301.83) vs Histrionic Personality Disorder (301.50)    Opioid dependence, chronic  Obesity per BMI of 32.44    Medical comorbidities include:   Patient Active Problem List    Diagnosis Date Noted     Essential hypertension with goal blood pressure less than 140/90 11/01/2016     Priority: Medium     DIAZ (nonalcoholic steatohepatitis) 06/17/2016     Priority: Medium     Chr Lung Disease - managed by the pulm dept ([facility])^^^ 04/15/2016     Priority: Medium     Continuous opioid dependence (HCC) opioid for chr cough and chest pain ^^^^ 04/15/2016     Priority: Medium     Chr PAIN - Ctr SUBSTANCE AGREEMENT - SIGNED 10/27/2015     Priority: Medium     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 Medication side effects and alternatives were reviewed. Health promotion activities recommended and reviewed today. Strongly recommend a Controlled Substance Agreement with PCP for benzodiazepines. Patient will be going to see specialist for procedure tomorrow.   Patient will see her PCP later today.     Patient continues to smoke- a few cigarettes per day lately to try to cope with anxiety.   Used Chantix that helped before. Recommend this as a start from Primary Care Provider again. Patient has been on this before.  Nicorette lozenges were also helpful for patient and could be restarted too.     Treatment Plan:    Continue Valium (diazepam) 10 mg 2-3 times per day for anxiety per Primary Care Provider.    Increase Lexapro (escitalopram) to 20 mg by mouth daily for anxiety and depression.     Continue Effexor (venlafaxine) XR 37.5 mg by mouth daily for 2 weeks, then increase to 75 mg by mouth daily for hot flashes, anxiety, and depression. May increase this dose as needed in the future.     Increase Vistaril/Atarax (hydroxyzine) 50 mg up to 4 times per day for anxiety.     Trial Sinequan (doxepin) 10 mg by mouth daily at bedtime for sleep.     Continue all other medications as reviewed per electronic medical record today.     All questions addressed. Education and counseling completed regarding risks and benefits of medications and psychotherapy options.    Safety plan was reviewed. To the Emergency Department as needed or call after hours crisis line at [phone] or  [phone].     To schedule individual or family therapy, call [facility] at [phone].    Schedule an appointment with me in 6-8 weeks or sooner as needed. Call [facility] at [phone] to schedule.    Follow up with primary care provider as planned or for acute medical concerns.    Call the psychiatric nurse line with medication questions or concerns at [phone].    My Practice Policy was reviewed and signed: YES     Graceful Tableshart may be used to communicate with your provider, but this is not intended to be used for emergencies.    Administrative Billing:   Time spent with patient was 30 minutes and greater than 50% of time or 20 minutes was spent in counseling and coordination of care.    Patient Status:  Patient will continue to be seen for ongoing consultation and stabilization.    Signed:   Milena Joseph, PhD, APRN, CNP   Psychiatry

## 2017-06-29 NOTE — NURSING NOTE
"Chief Complaint   Patient presents with     Recheck Medication       Initial /80 (BP Location: Right arm, Patient Position: Chair, Cuff Size: Adult Regular)  Pulse 82  Temp 98.2  F (36.8  C) (Oral)  Wt 188 lb 6.4 oz (85.5 kg)  LMP 04/15/2016  SpO2 99%  Breastfeeding? No  BMI 32.34 kg/m2 Estimated body mass index is 32.34 kg/(m^2) as calculated from the following:    Height as of an earlier encounter on 6/29/17: 5' 4\" (1.626 m).    Weight as of this encounter: 188 lb 6.4 oz (85.5 kg).  Medication Reconciliation: complete   Aneesh Koehler MA    "

## 2017-06-29 NOTE — MR AVS SNAPSHOT
After Visit Summary   6/29/2017    Swetha Patterson    MRN: [id number]           Patient Information     Date Of Birth          1970        Visit Information        Provider Department      6/29/2017 12:40 PM Roro Chawla MD [facility]        Today's Diagnoses     Continuous opioid dependence (HCC) opioid for chr cough and chest pain ^^^^        Chr Lung Disease - managed by the pulm dept (HCC)^^^        Chest wall pain        Muscle pain        Anxiety           Follow-ups after your visit        Your next 10 appointments already scheduled     Jul 05, 2017  3:00 PM CDT   XR CERVIC/THORACIC TRANSFORAMINAL INJECTION with RSCCXR2, [facility] IMAGING NURSE, [facility] ([facility])    [address]  [facility] [address] [phone]           For nerve root injection, please send or bring copies of any MRIs or other scans you have had.  Bring a list of your current medicines to your exam. (Include vitamins, minerals and over-the-counter medicines.) Leave your valuables at home.  Plan to have someone drive you home afterward.  Stop taking the following medicines (but talk to your doctor first):   If you take blood thinners, you may need to stop taking them a few days before treatment. Talk to your doctor before stopping these medicines.Stop taking Coumadin (warfarin) 3 days before treatment. Restart the day after treatment.   If you take Plavix, Ticlid, Pletal or Persantine, please ask your doctor if you should stop these medicines. You may need extra tests on the morning of your scan. You may take your other medicines as normal.  Stop all food and drink (including water) 3 hours before your test or treatment.  Please tell the doctor:   If you are allergic to X-ray dye (contrast fluid).   If you may be pregnant.  Injections take about 30 to 45 minutes. Most people spend up to 2 hours in the  clinic or hospital.  Please call the Imaging Department at your exam site with any questions            Jul 12, 2017 10:30 AM CDT   US PELVIC COMPLETE W TRANSVAGINAL with RIUS1   [facility] ([facility])    [address]  [address]  [facility] [id number] [phone]           Please bring a list of your medicines (including vitamins, minerals and over-the-counter drugs). Also, tell your doctor about any allergies you may have. Wear comfortable clothes and leave your valuables at home.  Adults: Drink six 8-ounce glasses of fluid one hour before your exam. Do NOT empty your bladder.  If you need to empty your bladder before your exam, try to release only a little bit of urine. Then, drink another 8oz glass of fluid.  Children: Children who are potty trained should drink at least 4 cups (32 oz) of liquid 45 minutes to one hour prior to the exam. The child s bladder must be full in order to achieve a diagnostic exam. If your child is very uncomfortable or has an urgent need to pee, please notify a technologist; they will try to find out how much longer the wait may be and provide instructions to help relieve the pressure. Occasionally it is medically necessary to insert a urinary catheter to fill the bladder.  Please call the Imaging Department at your exam site with any questions.            Jul 27, 2017 12:00 PM CDT   SHORT with Roro Chawla MD   [facility] ([facility])    [address] [place] [id number] [phone]            Aug 14, 2017  2:15 PM CDT   Return Visit with Milena Joseph NP   [facility] ([facility])    [address]  [address]  [facility] [id number] [phone]            Aug 18, 2017 10:00 AM CDT   (Arrive by 9:30 AM)   NEW THROAT with Lis Talbert MD   [facility] ([facility]  [place])    [address]  [address] [facility] [address] [phone]           - This is a multidisciplinary care team visit with an ENT physician and may include a speech language pathologist. - It is important to know that if you are evaluated and/or treated by both a physician and a speech pathologist during your visit, your billing will reflect the input that you receive from both providers as separate professionals. Although most insurance plans do cover these services, we encourage you to contact your insurance company prior to your visit to determine whether there are any coverage limitations that might affect you financially. - Billing/procedure codes that are frequently associated with visits to our clinic include (but are not limited to) the ones listed below. Most patients will not need all of these items, but it may be useful to ask your insurance company's patient . 81672: Flexible fiberoptic laryngoscopy, 06769: Laryngoscopy; flexible or rigid fiberoptic, with stroboscopy, 41792: Flexible endoscopic evaluation of swallowing, 94761: Evaluation of Voice and Resonance, 15934: Speech pathology treatment for voice, speech, communication, 35091: Speech pathology treatment for swallowing/oral function for feeding - If you have any concerns or questions, or if you would prefer not to have a speech pathologist involved in your visit, please contact our Clinic Coordinator at [phone], at least 24 hours prior to your appointment.              Who to contact     If you have questions or need follow up information about today's clinic visit or your schedule please contact [facility] directly at [phone].  Normal or non-critical lab and imaging results will be communicated to you by MyChart, letter or phone within 4 business days after the clinic has received the results. If you do not hear from us within 7 days, please contact the clinic through  "Guest of a Guesthart or phone. If you have a critical or abnormal lab result, we will notify you by phone as soon as possible.  Submit refill requests through [employer] or call your pharmacy and they will forward the refill request to us. Please allow 3 business days for your refill to be completed.          Additional Information About Your Visit        Guest of a GuestharZazuba Information     Slingbox lets you send messages to your doctor, view your test results, renew your prescriptions, schedule appointments and more. To sign up, go to [email or web address] . Click on \"Log in\" on the left side of the screen, which will take you to the Welcome page. Then click on \"Sign up Now\" on the right side of the page.     You will be asked to enter the access code listed below, as well as some personal information. Please follow the directions to create your username and password.     Your access code is: [email or web address]  Expires: 10/1/2017  7:07 PM     Your access code will  in 90 days. If you need help or a new code, please call your [place] clinic or [phone].        Care EveryWhere ID     This is your Care EveryWhere ID. This could be used by other organizations to access your [place] medical records  [id number]        Your Vitals Were     Pulse Temperature Height Last Period Pulse Oximetry Breastfeeding?    82 98.2  F (36.8  C) (Oral) 5' 4\" (1.626 m) 04/15/2016 99% No    BMI (Body Mass Index)                   32.34 kg/m2            Blood Pressure from Last 3 Encounters:   17 162/80   17 122/60   17 110/82    Weight from Last 3 Encounters:   17 188 lb 6.4 oz (85.5 kg)   17 189 lb (85.7 kg)   17 186 lb 14.4 oz (84.8 kg)              Today, you had the following     No orders found for display         Today's Medication Changes          These changes are accurate as of: 17 11:59 PM.  If you have any questions, ask your nurse or doctor.               Start taking these medicines.        " Dose/Directions    doxepin 10 MG capsule   Commonly known as:  SINEquan   Used for:  Persistent insomnia   Started by:  Milena Joseph NP        Dose:  10 mg   Take 1 capsule (10 mg) by mouth At Bedtime   Quantity:  30 capsule   Refills:  1         These medicines have changed or have updated prescriptions.        Dose/Directions    escitalopram 20 MG tablet   Commonly known as:  LEXAPRO   This may have changed:    - medication strength  - how much to take  - additional instructions   Used for:  Anxiety   Changed by:  Milena Joseph NP        Dose:  20 mg   Take 1 tablet (20 mg) by mouth daily Increased dose.   Quantity:  90 tablet   Refills:  1       hydrOXYzine 50 MG tablet   Commonly known as:  ATARAX   This may have changed:    - medication strength  - how much to take  - when to take this  - reasons to take this  - additional instructions   Used for:  Anxiety   Changed by:  Milena Joseph NP        Dose:  50 mg   Take 1 tablet (50 mg) by mouth 4 times daily as needed for anxiety Increased dose. For anxiety   Quantity:  120 tablet   Refills:  1            Where to get your medicines      These medications were sent to [facility] - [place], [place] - [address] [place] [address]     Phone:  [phone]     doxepin 10 MG capsule    escitalopram 20 MG tablet    hydrOXYzine 50 MG tablet         Some of these will need a paper prescription and others can be bought over the counter.  Ask your nurse if you have questions.     Bring a paper prescription for each of these medications     diazepam 10 MG tablet    oxyCODONE 10 MG IR tablet                Primary Care Provider Office Phone # Fax #    Roro Chawla [phone]       [facility] [address] [place] [address]        Equal Access to Services     CHARITY MANDUJANO AH: Ramon Jin, amanda kendrick, caryl bruce  ulissesbebo lisbethgualberto eliudaminata lamary ah. So [facility] [phone].    ATENCIÓN: Si habla yong, tiene a vargas disposición servicios gratuitos de asistencia lingüística. Briseida neal [phone].    We comply with applicable federal civil rights laws and [place] laws. We do not discriminate on the basis of race, color, national origin, age, disability sex, sexual orientation or gender identity.            Thank you!     Thank you for choosing [facility]  for your care. Our goal is always to provide you with excellent care. Hearing back from our patients is one way we can continue to improve our services. Please take a few minutes to complete the written survey that you may receive in the mail after your visit with us. Thank you!             Your Updated Medication List - Protect others around you: Learn how to safely use, store and throw away your medicines at www.disposemymeds.org.          This list is accurate as of: 6/29/17 11:59 PM.  Always use your most recent med list.                   Brand Name Dispense Instructions for use Diagnosis    albuterol 108 (90 BASE) MCG/ACT Inhaler    VENTOLIN HFA    3 Inhaler    Inhale 2 puffs into the lungs every 6 hours    Shortness of breath       amLODIPine 10 MG tablet    NORVASC    90 tablet    Take 1 tablet (10 mg) by mouth daily    HTN, goal below 140/90       atorvastatin 80 MG tablet    LIPITOR    90 tablet    Take 1 tablet (80 mg) by mouth daily    Family history of ischemic heart disease, Hyperlipidemia LDL goal <130       budesonide-formoterol 160-4.5 MCG/ACT Inhaler    SYMBICORT    3 Inhaler    Inhale 2 puffs into the lungs 2 times daily    Uncomplicated asthma, unspecified asthma severity       cetirizine HCl 10 MG Caps     90 capsule    Take 1 capsule by mouth daily    Chronic rhinitis       chlorhexidine 4 % liquid    HIBICLENS    946 mL    Wash ab, groin, thighs daily in shower DO NOT PUT ON FACE    Hidradenitis suppurativa       clindamycin-benzoyl peroxide  gel    BENZACLIN    100 g    Apply topically 2 times daily    Hidradenitis suppurativa       desonide 0.05 % cream    DESOWEN    60 g    Apply sparingly to affected area three times daily as needed.    Localized pruritus       diazepam 10 MG tablet    VALIUM    90 tablet    Take 1 tablet (10 mg) by mouth every 8 hours as needed for anxiety or sleep For sleep, anxiety, and muscle tension.    Chest wall pain, Muscle pain, Anxiety       doxepin 10 MG capsule    SINEquan    30 capsule    Take 1 capsule (10 mg) by mouth At Bedtime    Persistent insomnia       doxycycline Monohydrate 100 MG Tabs     42 tablet    One pill by mouth twice daily    Hidradenitis suppurativa       escitalopram 20 MG tablet    LEXAPRO    90 tablet    Take 1 tablet (20 mg) by mouth daily Increased dose.    Anxiety       estradiol 0.1 MG/GM cream    ESTRACE VAGINAL    42.5 g    1 gm pv nightly at bedtime for 2 weeks, then twice a week at bedtime for maintenance.    Atrophic vaginitis       fluticasone 50 MCG/ACT spray    FLONASE    16 g    Spray 2 sprays into both nostrils daily    Severe chronic obstructive pulmonary disease (H)       furosemide 20 MG tablet    LASIX    90 tablet    Take 1 tablet (20 mg) by mouth daily as needed    Bilateral leg edema       hydrOXYzine 50 MG tablet    ATARAX    120 tablet    Take 1 tablet (50 mg) by mouth 4 times daily as needed for anxiety Increased dose. For anxiety    Anxiety       ipratropium - albuterol 0.5 mg/2.5 mg/3 mL 0.5-2.5 (3) MG/3ML neb solution    DUONEB    360 mL    Take 1 vial (3 mLs) by nebulization every 6 hours    Uncomplicated asthma, unspecified asthma severity       irbesartan 300 MG tablet    AVAPRO    90 tablet    Take 1 tablet (300 mg) by mouth daily    Essential hypertension with goal blood pressure less than 140/90       ketoconazole 2 % shampoo    NIZORAL    120 mL    Apply to the affected area and wash off after 5 minutes.    Localized pruritus       lisinopril 20 MG tablet     PRINIVIL/ZESTRIL    90 tablet    Take 1 tablet (20 mg) by mouth daily    Essential hypertension with goal blood pressure less than 140/90       montelukast 10 MG tablet    SINGULAIR    30 tablet    Take 1 tablet (10 mg) by mouth At Bedtime    Dust allergy, Severe chronic obstructive pulmonary disease (H)       NICORETTE 2 MG lozenge   Generic drug:  nicotine polacrilex      PLACE 1 LOZENGE INSIDE CHEEK Q HOUR PRF SMOKING CESSATION UTD        ondansetron 8 MG tablet    ZOFRAN    30 tablet    Take 0.5-1 tablets (4-8 mg) by mouth every 8 hours as needed for nausea    Nausea       order for DME     1 Device    Equipment being ordered: Nebulizer machine Length of need-lifetime    Chronic lung disease, Cough, Severe persistent asthma with acute exacerbation       oxyCODONE 10 MG IR tablet    ROXICODONE    150 tablet    5 times daily as needed    Chest wall pain, Severe chronic obstructive pulmonary disease (H), Continuous opioid dependence (H)       pantoprazole 20 MG EC tablet    PROTONIX    90 tablet    Take 2 tablets (40 mg) by mouth every morning (before breakfast)    Gastroesophageal reflux disease without esophagitis       prochlorperazine 5 MG tablet    COMPAZINE    90 tablet    Take 1 tablet (5 mg) by mouth every 6 hours as needed for nausea or vomiting    Nausea       ranitidine 75 MG tablet    ZANTAC    90 tablet    Take 2 tablets (150 mg) by mouth daily    Uncomplicated asthma, unspecified asthma severity       tiotropium 18 MCG capsule    SPIRIVA HANDIHALER    30 capsule    Inhale contents of one capsule daily.    Other emphysema (H)       venlafaxine 37.5 MG 24 hr capsule    EFFEXOR-XR    46 capsule    Take 1 capsule daily for 14 days, then take 2 capsules daily.    Menopausal syndrome (hot flashes)       VITAMIN D (CHOLECALCIFEROL) PO      Take 2,000 Units by mouth daily

## 2017-06-29 NOTE — NURSING NOTE
"Chief Complaint   Patient presents with     RECHECK     anxiety out of control the past month, snappy, cannot sleep well, stopped hydroxyzine 1 week ago stopped working, pt will start effexor today per OB doc       Initial /60 (BP Location: Right arm, Patient Position: Chair, Cuff Size: Adult Large)  Pulse 85  Temp 97.9  F (36.6  C) (Oral)  Ht 5' 4\" (1.626 m)  Wt 189 lb (85.7 kg)  LMP 04/15/2016  SpO2 97%  BMI 32.44 kg/m2 Estimated body mass index is 32.44 kg/(m^2) as calculated from the following:    Height as of this encounter: 5' 4\" (1.626 m).    Weight as of this encounter: 189 lb (85.7 kg).  Medication Reconciliation: complete    "

## 2017-06-30 DIAGNOSIS — M54.2 CERVICALGIA: Primary | ICD-10-CM

## 2017-06-30 ASSESSMENT — PATIENT HEALTH QUESTIONNAIRE - PHQ9: SUM OF ALL RESPONSES TO PHQ QUESTIONS 1-9: 12

## 2017-06-30 ASSESSMENT — ANXIETY QUESTIONNAIRES: GAD7 TOTAL SCORE: 15

## 2017-07-26 ENCOUNTER — HOSPITAL ENCOUNTER (OUTPATIENT)
Age: 47
End: 2017-07-26
Payer: MEDICARE

## 2017-07-27 ENCOUNTER — OFFICE VISIT (OUTPATIENT)
Dept: INTERNAL MEDICINE | Facility: CLINIC | Age: 47
End: 2017-07-27
Payer: MEDICARE

## 2017-07-27 VITALS
DIASTOLIC BLOOD PRESSURE: 70 MMHG | WEIGHT: 193 LBS | HEART RATE: 83 BPM | OXYGEN SATURATION: 100 % | BODY MASS INDEX: 33.13 KG/M2 | TEMPERATURE: 98.5 F | SYSTOLIC BLOOD PRESSURE: 138 MMHG

## 2017-07-27 DIAGNOSIS — F41.9 ANXIETY: ICD-10-CM

## 2017-07-27 DIAGNOSIS — Z71.6 ENCOUNTER FOR SMOKING CESSATION COUNSELING: ICD-10-CM

## 2017-07-27 DIAGNOSIS — J44.9 SEVERE CHRONIC OBSTRUCTIVE PULMONARY DISEASE (H): Primary | Chronic | ICD-10-CM

## 2017-07-27 DIAGNOSIS — M79.10 MUSCLE PAIN: ICD-10-CM

## 2017-07-27 DIAGNOSIS — F11.20 CONTINUOUS OPIOID DEPENDENCE (H): Chronic | ICD-10-CM

## 2017-07-27 DIAGNOSIS — R07.89 CHEST WALL PAIN: ICD-10-CM

## 2017-07-27 PROCEDURE — 99214 OFFICE O/P EST MOD 30 MIN: CPT | Performed by: INTERNAL MEDICINE

## 2017-07-27 RX ORDER — PREDNISONE 10 MG/1
TABLET ORAL
Qty: 18 TABLET | Refills: 0 | Status: SHIPPED | OUTPATIENT
Start: 2017-07-27 | End: 2017-08-25

## 2017-07-27 RX ORDER — DIAZEPAM 10 MG
10 TABLET ORAL EVERY 8 HOURS PRN
Qty: 90 TABLET | Refills: 0 | Status: SHIPPED | OUTPATIENT
Start: 2017-07-27 | End: 2017-08-25

## 2017-07-27 RX ORDER — OXYCODONE HYDROCHLORIDE 10 MG/1
TABLET ORAL
Qty: 150 TABLET | Refills: 0 | Status: SHIPPED | OUTPATIENT
Start: 2017-07-27 | End: 2017-08-25

## 2017-07-27 NOTE — PROGRESS NOTES
Patient's instructions / PLAN:                                                        Plan:  1. Prednisone 10 mg:  -- take 2 tabs daily for 5 days, then take 1 tab daily for 5 days, then 1/2 tab daily for 6 days  2. Continue the other meds, same doses for now.  3. Follow up in a month        ASSESSMENT & PLAN:                                                      (J44.9) Chr Lung Disease - managed by the pulm dept ([facility])^^^  (primary encounter diagnosis)  Comment:   Plan: predniSONE (DELTASONE) 10 MG tablet, oxyCODONE         (ROXICODONE) 10 MG IR tablet            (F11.20) Continuous opioid dependence ([facility]) opioid for chr cough and chest pain ^^^^  Comment:   Plan: oxyCODONE (ROXICODONE) 10 MG IR tablet            (R07.89) Chest wall pain  Comment:   Plan: oxyCODONE (ROXICODONE) 10 MG IR tablet,         diazepam (VALIUM) 10 MG tablet            (M79.1) Muscle pain  Comment:   Plan: diazepam (VALIUM) 10 MG tablet            (Z71.6,  Z72.0) Encounter for smoking cessation counseling  Comment:   Plan: varenicline (CHANTIX STARTING MONTH PAK) 0.5 MG        X 11 & 1 MG X 42 tablet            (F41.9) Anxiety  Comment:   Plan: diazepam (VALIUM) 10 MG tablet               Chief Complaint:                                                        Follow up chronic medical problems   Cough     SUBJECTIVE:                                                    History of present illness     Her cough is again more frequently.  They may need the makes her breathing worse.  She has some wheezes.  She has wheezes when she talks to me, but I did not hear them with the stethoscope    She has follow-up appointment with ENT and pulmonary at [place] in August and September.  Apparently her asthma and cough are complicated by nodules on the respiratory tract      Father is visiting, he smokes. She is craving smoking. She is interested in Chantix. She has used it before with no side effects and it helped her. I explained her she can't have  neck surgery id she smokes.           ROS:                                                      ROS: negative for fever, chills,  vomiting, abdominal pain, leg swelling positive for cough, wheezes, chest pain ( chronic chest pain related with so much cough), shortness of breath,     A 10-point review of systems was obtained.  Those pertinent are above and in the in the Subjective section.  The rest of the systems are negative.        OBJECTIVE:                                                    Physical Exam :    Blood pressure 138/70, pulse 83, temperature 98.5  F (36.9  C), temperature source Oral, weight 193 lb (87.5 kg), last menstrual period 04/15/2016, SpO2 100 %, not currently breastfeeding.   NAD, appears comfortable  Skin: no rashes   Neck: supple, no JVD, No thyroidmegaly. Lymph nodes nonpalpable cervical and supraclavicular.  Chest: clear to auscultation bilaterally, good respiratory effort. Coughs constantly    Heart: S1 S2, RRR, no mgr appreciated  Abdomen: soft, not tender  Extremities: no edema,   Neurologic: A, Ox3, no focal signs appreciated    PMHx: reviewed  Past Medical History:   Diagnosis Date     Anxiety state, unspecified      Arthritis     right hip arthritis     Asthma      Chronic pain     back pain from cyst     Contact dermatitis and other eczema, due to unspecified cause      COPD (chronic obstructive pulmonary disease) (H)      Depressive disorder, not elsewhere classified      Emphysema with chronic bronchitis (H)      Esophageal reflux      Family history of ischemic heart disease      Gastro-oesophageal reflux disease      HTN, goal below 140/90      Hyperlipidemia LDL goal <130      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)      PONV (postoperative nausea and vomiting)       PSHx: reviewed  Past Surgical History:   Procedure Laterality Date     ARTHROSCOPY SHOULDER DECOMPRESSION Left 10/21/2015    Procedure: ARTHROSCOPY SHOULDER DECOMPRESSION;  Surgeon: Julien Milian MD;   Location: [facility] OR     BRONCHIAL THERMOPLASTY N/A 11/14/2014    Procedure: BRONCHIAL THERMOPLASTY;  Surgeon: Ward Whitaker MD;  Location: [facility] GI     BRONCHIAL THERMOPLASTY N/A 12/19/2014    Procedure: BRONCHIAL THERMOPLASTY;  Surgeon: Ward Whitaker MD;  Location: [facility] OR     BRONCHIAL THERMOPLASTY N/A 2/6/2015    Procedure: BRONCHIAL THERMOPLASTY;  Surgeon: Ward Whitaker MD;  Location: [facility] OR     C APPENDECTOMY  at age 17     EXCISE NODE MEDIASTINAL  4/26/2013    Procedure: EXCISE NODE MEDIASTINAL;;  Surgeon: Av Peña MD;  Location: [facility] OR     THORACOSCOPY  4/26/2013    Procedure: THORACOSCOPY;  LEFT VIDEO ASSISTED THORACOSCOPY, RESECTION OF POSTERIOR MEDIASTINAL MASS;  Surgeon: Av Peña MD;  Location: [facility] OR     TONSILLECTOMY  as a kid        Meds: reviewed  Current Outpatient Prescriptions   Medication Sig Dispense Refill     hydrOXYzine (ATARAX) 50 MG tablet Take 1 tablet (50 mg) by mouth 4 times daily as needed for anxiety Increased dose. For anxiety 120 tablet 1     escitalopram (LEXAPRO) 20 MG tablet Take 1 tablet (20 mg) by mouth daily Increased dose. 90 tablet 1     doxepin (SINEQUAN) 10 MG capsule Take 1 capsule (10 mg) by mouth At Bedtime 30 capsule 1     diazepam (VALIUM) 10 MG tablet Take 1 tablet (10 mg) by mouth every 8 hours as needed for anxiety or sleep For sleep, anxiety, and muscle tension. 90 tablet 0     oxyCODONE (ROXICODONE) 10 MG IR tablet 5 times daily as needed 150 tablet 0     venlafaxine (EFFEXOR-XR) 37.5 MG 24 hr capsule Take 1 capsule daily for 14 days, then take 2 capsules daily. 46 capsule 0     estradiol (ESTRACE VAGINAL) 0.1 MG/GM cream 1 gm pv nightly at bedtime for 2 weeks, then twice a week at bedtime for maintenance. 42.5 g 6     doxycycline Monohydrate 100 MG TABS One pill by mouth twice daily 42 tablet 0     ondansetron (ZOFRAN) 8 MG tablet Take 0.5-1 tablets (4-8 mg) by mouth every 8 hours as needed for nausea 30 tablet 1      fluticasone (FLONASE) 50 MCG/ACT spray Spray 2 sprays into both nostrils daily 16 g 11     ranitidine (ZANTAC) 75 MG tablet Take 2 tablets (150 mg) by mouth daily 90 tablet 1     cetirizine HCl 10 MG CAPS Take 1 capsule by mouth daily 90 capsule 3     atorvastatin (LIPITOR) 80 MG tablet Take 1 tablet (80 mg) by mouth daily 90 tablet 1     amLODIPine (NORVASC) 10 MG tablet Take 1 tablet (10 mg) by mouth daily 90 tablet 1     lisinopril (PRINIVIL/ZESTRIL) 20 MG tablet Take 1 tablet (20 mg) by mouth daily 90 tablet 1     irbesartan (AVAPRO) 300 MG tablet Take 1 tablet (300 mg) by mouth daily 90 tablet 1     montelukast (SINGULAIR) 10 MG tablet Take 1 tablet (10 mg) by mouth At Bedtime 30 tablet 1     furosemide (LASIX) 20 MG tablet Take 1 tablet (20 mg) by mouth daily as needed 90 tablet 1     NICORETTE 2 MG lozenge PLACE 1 LOZENGE INSIDE CHEEK Q HOUR PRF SMOKING CESSATION UTD  1     budesonide-formoterol (SYMBICORT) 160-4.5 MCG/ACT Inhaler Inhale 2 puffs into the lungs 2 times daily 3 Inhaler 3     ipratropium - albuterol 0.5 mg/2.5 mg/3 mL (DUONEB) 0.5-2.5 (3) MG/3ML neb solution Take 1 vial (3 mLs) by nebulization every 6 hours 360 mL 0     tiotropium (SPIRIVA HANDIHALER) 18 MCG capsule Inhale contents of one capsule daily. 30 capsule 1     albuterol (VENTOLIN HFA) 108 (90 BASE) MCG/ACT Inhaler Inhale 2 puffs into the lungs every 6 hours 3 Inhaler 3     ketoconazole (NIZORAL) 2 % shampoo Apply to the affected area and wash off after 5 minutes. 120 mL 1     desonide (DESOWEN) 0.05 % cream Apply sparingly to affected area three times daily as needed. 60 g 0     pantoprazole (PROTONIX) 20 MG tablet Take 2 tablets (40 mg) by mouth every morning (before breakfast) 90 tablet 3     prochlorperazine (COMPAZINE) 5 MG tablet Take 1 tablet (5 mg) by mouth every 6 hours as needed for nausea or vomiting 90 tablet 0     order for DME Equipment being ordered: Nebulizer machine  Length of need-lifetime 1 Device 0      clindamycin-benzoyl peroxide (BENZACLIN) gel Apply topically 2 times daily 100 g 3     chlorhexidine (HIBICLENS) 4 % external liquid Wash ab, groin, thighs daily in shower DO NOT PUT ON FACE 946 mL 3     VITAMIN D, CHOLECALCIFEROL, PO Take 2,000 Units by mouth daily         Soc Hx: reviewed  Fam Hx: reviewed          Roro Low MD  Internal Medicine

## 2017-07-27 NOTE — NURSING NOTE
"Chief Complaint   Patient presents with     Breathing Problem     pt having hard time breathing with humidty,med refill       Initial /70 (BP Location: Right arm, Patient Position: Chair, Cuff Size: Adult Large)  Pulse 83  Temp 98.5  F (36.9  C) (Oral)  Wt 193 lb (87.5 kg)  LMP 04/15/2016  SpO2 100%  BMI 33.13 kg/m2 Estimated body mass index is 33.13 kg/(m^2) as calculated from the following:    Height as of 6/29/17: 5' 4\" (1.626 m).    Weight as of this encounter: 193 lb (87.5 kg).  Medication Reconciliation: complete    "

## 2017-07-27 NOTE — MR AVS SNAPSHOT
After Visit Summary   7/27/2017    Swetha Patterson    MRN: [id number]           Patient Information     Date Of Birth          1970        Visit Information        Provider Department      7/27/2017 12:00 PM Roro Chawla MD [facility]        Today's Diagnoses     Encounter for smoking cessation counseling    -  1    Chr Lung Disease - managed by the pulm dept ([facility])^^^        Chest wall pain        Continuous opioid dependence ([facility]) opioid for chr cough and chest pain ^^^^        Muscle pain        Anxiety          Care Instructions    Plan:  1. Prednisone 10 mg:  -- take 2 tabs daily for 5 days, then take 1 tab daily for 5 days, then 1/2 tab daily for 6 days  2. Continue the other meds, same doses for now.  3. Follow up in a month          Follow-ups after your visit        Your next 10 appointments already scheduled     Jul 31, 2017 11:00 AM CDT   XR CERVIC/THORACIC TRANSFORAMINAL INJECTION with AMAN,  IMAGING NURSE, [facility]   [facility] ([facility])    [address] [place] [id number] [phone]           For nerve root injection, please send or bring copies of any MRIs or other scans you have had.  Bring a list of your current medicines to your exam. (Include vitamins, minerals and over-the-counter medicines.) Leave your valuables at home.  Plan to have someone drive you home afterward.  Stop taking the following medicines (but talk to your doctor first):   If you take blood thinners, you may need to stop taking them a few days before treatment. Talk to your doctor before stopping these medicines.Stop taking Coumadin (warfarin) 3 days before treatment. Restart the day after treatment.   If you take Plavix, Ticlid, Pletal or Persantine, please ask your doctor if you should stop these medicines. You may need extra tests on the morning of your scan. You may take your other medicines as normal.  Stop all  food and drink (including water) 3 hours before your test or treatment.  Please tell the doctor:   If you are allergic to X-ray dye (contrast fluid).   If you may be pregnant.  Injections take about 30 to 45 minutes. Most people spend up to 2 hours in the clinic or hospital.  Please call the Imaging Department at your exam site with any questions            Aug 14, 2017  2:15 PM CDT   [facility] Milena Joseph NP   [facility]    [address]  [address]  [facility] [id number] [phone]            Aug 18, 2017 10:00 AM CDT   (Arrive by 9:30 AM)   NEW THROAT with Lis Talbert MD   [facility] ([facility])    [address]  [address] [facility] [address] [phone]           - This is a multidisciplinary care team visit with an ENT physician and may include a speech language pathologist. - It is important to know that if you are evaluated and/or treated by both a physician and a speech pathologist during your visit, your billing will reflect the input that you receive from both providers as separate professionals. Although most insurance plans do cover these services, we encourage you to contact your insurance company prior to your visit to determine whether there are any coverage limitations that might affect you financially. - Billing/procedure codes that are frequently associated with visits to our clinic include (but are not limited to) the ones listed below. Most patients will not need all of these items, but it may be useful to ask your insurance company's patient . 95835: Flexible fiberoptic laryngoscopy, 38810: Laryngoscopy; flexible or rigid fiberoptic, with stroboscopy, 43576: Flexible endoscopic evaluation of swallowing, 30724: Evaluation of Voice and Resonance, 69031: Speech pathology treatment for voice, speech, communication, 27263:  "Speech pathology treatment for swallowing/oral function for feeding - If you have any concerns or questions, or if you would prefer not to have a speech pathologist involved in your visit, please contact our Clinic Coordinator at [phone], at least 24 hours prior to your appointment.              Who to contact     If you have questions or need follow up information about today's clinic visit or your schedule please contact [facility] directly at [phone].  Normal or non-critical lab and imaging results will be communicated to you by [employer], letter or phone within 4 business days after the clinic has received the results. If you do not hear from us within 7 days, please contact the clinic through [employer] or phone. If you have a critical or abnormal lab result, we will notify you by phone as soon as possible.  Submit refill requests through [employer] or call your pharmacy and they will forward the refill request to us. Please allow 3 business days for your refill to be completed.          Additional Information About Your Visit        Vine Information     Vine lets you send messages to your doctor, view your test results, renew your prescriptions, schedule appointments and more. To sign up, go to [email or web address] . Click on \"Log in\" on the left side of the screen, which will take you to the Welcome page. Then click on \"Sign up Now\" on the right side of the page.     You will be asked to enter the access code listed below, as well as some personal information. Please follow the directions to create your username and password.     Your access code is: [email or web address]  Expires: 10/1/2017  7:07 PM     Your access code will  in 90 days. If you need help or a new code, please call your [facility] clinic or [phone].        Care EveryWhere ID     This is your Care EveryWhere ID. This could be used by other organizations to access your [facility] medical records  [id number]      "   Your Vitals Were     Pulse Temperature Last Period Pulse Oximetry BMI (Body Mass Index)       83 98.5  F (36.9  C) (Oral) 04/15/2016 100% 33.13 kg/m2        Blood Pressure from Last 3 Encounters:   07/27/17 138/70   06/29/17 162/80   06/29/17 122/60    Weight from Last 3 Encounters:   07/27/17 193 lb (87.5 kg)   06/29/17 188 lb 6.4 oz (85.5 kg)   06/29/17 189 lb (85.7 kg)              Today, you had the following     No orders found for display         Today's Medication Changes          These changes are accurate as of: 7/27/17 12:30 PM.  If you have any questions, ask your nurse or doctor.               Start taking these medicines.        Dose/Directions    predniSONE 10 MG tablet   Commonly known as:  DELTASONE   Used for:  Severe chronic obstructive pulmonary disease (H)   Started by:  Roro Chawla MD        take 2 tabs daily for 5 days, then take 1 tab daily for 5 days, then 1/2 tab daily for 6 days   Quantity:  18 tablet   Refills:  0       varenicline 0.5 MG X 11 & 1 MG X 42 tablet   Commonly known as:  CHANTIX STARTING MONTH CHAPITO   Used for:  Encounter for smoking cessation counseling   Started by:  Roro Chawla MD        Take 0.5 mg tab daily for 3 days, then 0.5 mg tab twice daily for 4 days, then 1 mg twice daily.   Quantity:  53 tablet   Refills:  0            Where to get your medicines      These medications were sent to [facility] [place] [address]  [facility] [address] [address]     Phone:  [phone]     predniSONE 10 MG tablet    varenicline 0.5 MG X 11 & 1 MG X 42 tablet         Some of these will need a paper prescription and others can be bought over the counter.  Ask your nurse if you have questions.     Bring a paper prescription for each of these medications     diazepam 10 MG tablet    oxyCODONE 10 MG IR tablet                Primary Care Provider Office Phone # Fax #    Roro Mehta  MD Cammy [phone]       [facility] [address]  [facility] [address]        Equal Access to Services     CHARITY MANDUJANO : Ramon Jin, waneliada luqadaha, qaybta kaalmada alexandru, caryl frankyin hayaanadia bobgualberto eliudaminata camryn saunders. So [facility] [phone].    ATENCIÓN: Si habla español, tiene a vargas disposición servicios gratuitos de asistencia lingüística. Llame al [phone].    We comply with applicable federal civil rights laws and [place] laws. We do not discriminate on the basis of race, color, national origin, age, disability sex, sexual orientation or gender identity.            Thank you!     Thank you for choosing [facility]  for your care. Our goal is always to provide you with excellent care. Hearing back from our patients is one way we can continue to improve our services. Please take a few minutes to complete the written survey that you may receive in the mail after your visit with us. Thank you!             Your Updated Medication List - Protect others around you: Learn how to safely use, store and throw away your medicines at www.disposemymeds.org.          This list is accurate as of: 7/27/17 12:30 PM.  Always use your most recent med list.                   Brand Name Dispense Instructions for use Diagnosis    albuterol 108 (90 BASE) MCG/ACT Inhaler    VENTOLIN HFA    3 Inhaler    Inhale 2 puffs into the lungs every 6 hours    Shortness of breath       amLODIPine 10 MG tablet    NORVASC    90 tablet    Take 1 tablet (10 mg) by mouth daily    HTN, goal below 140/90       atorvastatin 80 MG tablet    LIPITOR    90 tablet    Take 1 tablet (80 mg) by mouth daily    Family history of ischemic heart disease, Hyperlipidemia LDL goal <130       budesonide-formoterol 160-4.5 MCG/ACT Inhaler    SYMBICORT    3 Inhaler    Inhale 2 puffs into the lungs 2 times daily    Uncomplicated asthma, unspecified asthma severity       cetirizine HCl 10 MG  Caps     90 capsule    Take 1 capsule by mouth daily    Chronic rhinitis       chlorhexidine 4 % liquid    HIBICLENS    946 mL    Wash ab, groin, thighs daily in shower DO NOT PUT ON FACE    Hidradenitis suppurativa       clindamycin-benzoyl peroxide gel    BENZACLIN    100 g    Apply topically 2 times daily    Hidradenitis suppurativa       desonide 0.05 % cream    DESOWEN    60 g    Apply sparingly to affected area three times daily as needed.    Localized pruritus       diazepam 10 MG tablet    VALIUM    90 tablet    Take 1 tablet (10 mg) by mouth every 8 hours as needed for anxiety or sleep For sleep, anxiety, and muscle tension.    Chest wall pain, Muscle pain, Anxiety       doxepin 10 MG capsule    SINEquan    30 capsule    Take 1 capsule (10 mg) by mouth At Bedtime    Persistent insomnia       doxycycline Monohydrate 100 MG Tabs     42 tablet    One pill by mouth twice daily    Hidradenitis suppurativa       escitalopram 20 MG tablet    LEXAPRO    90 tablet    Take 1 tablet (20 mg) by mouth daily Increased dose.    Anxiety       estradiol 0.1 MG/GM cream    ESTRACE VAGINAL    42.5 g    1 gm pv nightly at bedtime for 2 weeks, then twice a week at bedtime for maintenance.    Atrophic vaginitis       fluticasone 50 MCG/ACT spray    FLONASE    16 g    Spray 2 sprays into both nostrils daily    Severe chronic obstructive pulmonary disease (H)       furosemide 20 MG tablet    LASIX    90 tablet    Take 1 tablet (20 mg) by mouth daily as needed    Bilateral leg edema       hydrOXYzine 50 MG tablet    ATARAX    120 tablet    Take 1 tablet (50 mg) by mouth 4 times daily as needed for anxiety Increased dose. For anxiety    Anxiety       ipratropium - albuterol 0.5 mg/2.5 mg/3 mL 0.5-2.5 (3) MG/3ML neb solution    DUONEB    360 mL    Take 1 vial (3 mLs) by nebulization every 6 hours    Uncomplicated asthma, unspecified asthma severity       irbesartan 300 MG tablet    AVAPRO    90 tablet    Take 1 tablet (300 mg) by  mouth daily    Essential hypertension with goal blood pressure less than 140/90       ketoconazole 2 % shampoo    NIZORAL    120 mL    Apply to the affected area and wash off after 5 minutes.    Localized pruritus       lisinopril 20 MG tablet    PRINIVIL/ZESTRIL    90 tablet    Take 1 tablet (20 mg) by mouth daily    Essential hypertension with goal blood pressure less than 140/90       montelukast 10 MG tablet    SINGULAIR    30 tablet    Take 1 tablet (10 mg) by mouth At Bedtime    Dust allergy, Severe chronic obstructive pulmonary disease (H)       NICORETTE 2 MG lozenge   Generic drug:  nicotine polacrilex      PLACE 1 LOZENGE INSIDE CHEEK Q HOUR PRF SMOKING CESSATION UTD        ondansetron 8 MG tablet    ZOFRAN    30 tablet    Take 0.5-1 tablets (4-8 mg) by mouth every 8 hours as needed for nausea    Nausea       order for DME     1 Device    Equipment being ordered: Nebulizer machine Length of need-lifetime    Chronic lung disease, Cough, Severe persistent asthma with acute exacerbation       oxyCODONE 10 MG IR tablet    ROXICODONE    150 tablet    5 times daily as needed    Chest wall pain, Severe chronic obstructive pulmonary disease (H), Continuous opioid dependence (H)       pantoprazole 20 MG EC tablet    PROTONIX    90 tablet    Take 2 tablets (40 mg) by mouth every morning (before breakfast)    Gastroesophageal reflux disease without esophagitis       predniSONE 10 MG tablet    DELTASONE    18 tablet    take 2 tabs daily for 5 days, then take 1 tab daily for 5 days, then 1/2 tab daily for 6 days    Severe chronic obstructive pulmonary disease (H)       prochlorperazine 5 MG tablet    COMPAZINE    90 tablet    Take 1 tablet (5 mg) by mouth every 6 hours as needed for nausea or vomiting    Nausea       ranitidine 75 MG tablet    ZANTAC    90 tablet    Take 2 tablets (150 mg) by mouth daily    Uncomplicated asthma, unspecified asthma severity       tiotropium 18 MCG capsule    SPIRIVA HANDIHALER    30  capsule    Inhale contents of one capsule daily.    Other emphysema (H)       varenicline 0.5 MG X 11 & 1 MG X 42 tablet    CHANTIX STARTING MONTH CHAPITO    53 tablet    Take 0.5 mg tab daily for 3 days, then 0.5 mg tab twice daily for 4 days, then 1 mg twice daily.    Encounter for smoking cessation counseling       venlafaxine 37.5 MG 24 hr capsule    EFFEXOR-XR    46 capsule    Take 1 capsule daily for 14 days, then take 2 capsules daily.    Menopausal syndrome (hot flashes)       VITAMIN D (CHOLECALCIFEROL) PO      Take 2,000 Units by mouth daily

## 2017-07-27 NOTE — PATIENT INSTRUCTIONS
Plan:  1. Prednisone 10 mg:  -- take 2 tabs daily for 5 days, then take 1 tab daily for 5 days, then 1/2 tab daily for 6 days  2. Continue the other meds, same doses for now.  3. Follow up in a month

## 2017-07-31 NOTE — DISCHARGE INSTRUCTIONS
Steroid Injection Discharge Instructions     After you go home:      You may resume your normal diet.    Care of Puncture Site:      If you have a bandaid on your puncture site, you may remove it the next morning    You may shower tomorrow    No bath tubs, whirlpools or swimming for at least 3 days     Activity:      You may go back to normal activity in 24 hours    You should let pain be your guide as to the extent of your activities    Maintain any activity limitations as ordered by your provider    Do NOT drive a vehicle until tomorrow    Medicines:      You may resume all medications    Resume your Warfarin/Coumadin at your regular dose today. Follow up with your provider to have your INR rechecked    Resume your Platelet Inhibitors and Aspirin tomorrow at your regular dose    For minor pain, you may take Acetaminophen (Tylenol) or Ibuprofen (Advil)    Pain:       You may experience increased or different pain over the next 24-48 hours    For the next 48 hrs - you may use ice packs for discomfort     Call your primary care doctor if:      You have severe pain that does not improve with pain medication    You have chills or a fever greater than 101 F (38 C)    The site is red, swollen, hot or tender    New problems with your bowel or bladder    Any questions or concerns    Other Instructions:      New numbness down your leg post injection is temporary and may last for up to 6 hours. You may need assistance with activity until your leg has normal sensation.    If you are diabetic, monitor your blood sugar closely. Contact the provider who manages your diabetes to help you control your blood sugar if needed.    For Your Information:      A steroid was injected to help decrease swelling and may help to reduce pain. It may take up to 7-10 days to obtain full results.    Some patients will get lasting relief from a single injection. Others may require up to 3 injections to get results. If you have more than one  steroid injection, they should be given 2 weeks apart.    Side effects of your steroid injection are mild and will go away in 2-3 days  - Insomnia  - Heartburn  - Flushed face  - Water retention  - Increased appetite  - Increased blood sugar      If you have questions call:        Svetlana [facility] @ [phone]      The provider who performed your procedure was _________________.

## 2017-08-03 ENCOUNTER — CARE COORDINATION (OUTPATIENT)
Dept: PULMONOLOGY | Facility: CLINIC | Age: 47
End: 2017-08-03

## 2017-08-03 NOTE — PROGRESS NOTES
"Pt called in stating yesterday became very ill in [employer]- \" not feeling well, suddenly became drenched in cold sweat, threw up x3 and started coughing badly. Coughed up a clot size of chetan dark brown/red in color.\" Pt reports that again today not feeling well and coughed up another clot that was red in color. Pt is looking to see if Dr. Williamson can see her and order CT scan. Reviewed sx with patient and advised she be seen in ED today as Dr. Williamson is not in. Pt verbalized understanding and will go to [facility]. Encouraged pt to make follow up appointment with Dr. Williamson after being seen in ED.  Patria Benítez RN BSN   "

## 2017-08-15 ENCOUNTER — HOSPITAL ENCOUNTER (OUTPATIENT)
Age: 47
End: 2017-08-15
Admitting: ORTHOPAEDIC SURGERY

## 2017-08-18 ENCOUNTER — OFFICE VISIT (OUTPATIENT)
Dept: OTOLARYNGOLOGY | Facility: CLINIC | Age: 47
End: 2017-08-18

## 2017-08-18 VITALS — WEIGHT: 199 LBS | BODY MASS INDEX: 33.97 KG/M2 | HEIGHT: 64 IN

## 2017-08-18 DIAGNOSIS — Z72.0 TOBACCO USE: ICD-10-CM

## 2017-08-18 DIAGNOSIS — J38.1 REINKE'S EDEMA OF VOCAL FOLDS: Primary | ICD-10-CM

## 2017-08-18 DIAGNOSIS — J38.4 PRE-NODULAR EDEMA OF THE VOCAL FOLDS: ICD-10-CM

## 2017-08-18 DIAGNOSIS — R49.0 DYSPHONIA: Primary | ICD-10-CM

## 2017-08-18 DIAGNOSIS — R49.0 DYSPHONIA: ICD-10-CM

## 2017-08-18 DIAGNOSIS — R05.3 CHRONIC COUGH: ICD-10-CM

## 2017-08-18 DIAGNOSIS — J38.7 IRRITABLE LARYNX: ICD-10-CM

## 2017-08-18 ASSESSMENT — PAIN SCALES - GENERAL: PAINLEVEL: EXTREME PAIN (9)

## 2017-08-18 NOTE — MR AVS SNAPSHOT
After Visit Summary   8/18/2017    Swetha Patterson    MRN: [id number]           Patient Information     Date Of Birth          1970        Visit Information        Provider Department      8/18/2017 10:00 AM Dianne Medel, SLP [facility] Voice        Today's Diagnoses     Dysphonia    -  1    Chronic cough        Irritable larynx        Pre-nodular edema of the vocal folds           Follow-ups after your visit        Your next 10 appointments already scheduled     Aug 25, 2017 12:20 PM CDT   SHORT with Roro Chawla MD   [facility])    [address] [place] [address]   [phone]            Sep 28, 2017  1:45 PM CDT   [facility]    [address]  [address]  [facility] [address]   [phone]            Nov 09, 2017  1:00 PM CST   (Arrive by 12:30 PM)   NEW NECK with Agustin Palomares MD   [facility] ([facility])    [address] [place] [id number] [phone]              Who to contact     Please call your clinic at [phone] to:    Ask questions about your health    Make or cancel appointments    Discuss your medicines    Learn about your test results    Speak to your doctor   If you have compliments or concerns about an experience at your clinic, or if you wish to file a complaint, please contact [facility] Patient Relations at [phone] or email us at [email or web address]         Additional Information About Your Visit        MyChart Information     [employer] is an electronic gateway that provides easy, online access to your medical records. With [employer], you can request a clinic appointment, read your test results, renew a prescription or communicate with your care team.     To sign up for  Kiett visit the website at [email or web address]   You will be asked to enter the access code listed below, as well as some personal information. Please follow the directions to create your username and password.     Your access code is: [email or web address]  Expires: 10/1/2017  7:07 PM     Your access code will  in 90 days. If you need help or a new code, please contact your [facility] or call [phone] for assistance.        Care EveryWhere ID     This is your Care EveryWhere ID. This could be used by other organizations to access your [place] medical records  [id number]        Your Vitals Were     Last Period                   04/15/2016            Blood Pressure from Last 3 Encounters:   17 138/70   17 162/80   17 122/60    Weight from Last 3 Encounters:   17 90.3 kg (199 lb)   17 87.5 kg (193 lb)   17 85.5 kg (188 lb 6.4 oz)              We Performed the Following     C BEHAVIORAL & QUALITATIVE ANALYSIS VOICE AND RESONANCE     HC  SLP VOICE CURRENT STATUS     HC SLP VOICE GOAL STATUS        Primary Care Provider Office Phone # Fax #    Roro Janine Chawla [phone]       [address] [facility] [address]        Equal Access to Services     CHARITY MANDUJANO : Hadii aad ku hadasho Soomaali, waaxda luqadaha, qaybta kaalmada adeegyada, caryl saunders. So [facility] [phone].    ATENCIÓN: Si habla español, tiene a vargas disposición servicios gratuitos de asistencia lingüística. Briseida al [phone].    We comply with applicable federal civil rights laws and [place] laws. We do not discriminate on the basis of race, color, national origin, age, disability sex, sexual orientation or gender identity.            Thank you!     Thank you for choosing [facility]  for your care. Our goal is always to provide you with excellent care. Hearing back from our patients is one way we can  continue to improve our services. Please take a few minutes to complete the written survey that you may receive in the mail after your visit with us. Thank you!             Your Updated Medication List - Protect others around you: Learn how to safely use, store and throw away your medicines at www.disposemymeds.org.          This list is accurate as of: 8/18/17 11:59 PM.  Always use your most recent med list.                   Brand Name Dispense Instructions for use Diagnosis    albuterol 108 (90 BASE) MCG/ACT Inhaler    VENTOLIN HFA    3 Inhaler    Inhale 2 puffs into the lungs every 6 hours    Shortness of breath       amLODIPine 10 MG tablet    NORVASC    90 tablet    Take 1 tablet (10 mg) by mouth daily    HTN, goal below 140/90       atorvastatin 80 MG tablet    LIPITOR    90 tablet    Take 1 tablet (80 mg) by mouth daily    Family history of ischemic heart disease, Hyperlipidemia LDL goal <130       budesonide-formoterol 160-4.5 MCG/ACT Inhaler    SYMBICORT    3 Inhaler    Inhale 2 puffs into the lungs 2 times daily    Uncomplicated asthma, unspecified asthma severity       cetirizine HCl 10 MG Caps     90 capsule    Take 1 capsule by mouth daily    Chronic rhinitis       chlorhexidine 4 % liquid    HIBICLENS    946 mL    Wash ab, groin, thighs daily in shower DO NOT PUT ON FACE    Hidradenitis suppurativa       clindamycin-benzoyl peroxide gel    BENZACLIN    100 g    Apply topically 2 times daily    Hidradenitis suppurativa       desonide 0.05 % cream    DESOWEN    60 g    Apply sparingly to affected area three times daily as needed.    Localized pruritus       diazepam 10 MG tablet    VALIUM    90 tablet    Take 1 tablet (10 mg) by mouth every 8 hours as needed for anxiety or sleep For sleep, anxiety, and muscle tension.    Chest wall pain, Muscle pain, Anxiety       doxepin 10 MG capsule    SINEquan    30 capsule    Take 1 capsule (10 mg) by mouth At Bedtime    Persistent insomnia       doxycycline  Monohydrate 100 MG Tabs     42 tablet    One pill by mouth twice daily    Hidradenitis suppurativa       escitalopram 20 MG tablet    LEXAPRO    90 tablet    Take 1 tablet (20 mg) by mouth daily Increased dose.    Anxiety       estradiol 0.1 MG/GM cream    ESTRACE VAGINAL    42.5 g    1 gm pv nightly at bedtime for 2 weeks, then twice a week at bedtime for maintenance.    Atrophic vaginitis       fluticasone 50 MCG/ACT spray    FLONASE    16 g    Spray 2 sprays into both nostrils daily    Severe chronic obstructive pulmonary disease (H)       furosemide 20 MG tablet    LASIX    90 tablet    Take 1 tablet (20 mg) by mouth daily as needed    Bilateral leg edema       hydrOXYzine 50 MG tablet    ATARAX    120 tablet    Take 1 tablet (50 mg) by mouth 4 times daily as needed for anxiety Increased dose. For anxiety    Anxiety       ipratropium - albuterol 0.5 mg/2.5 mg/3 mL 0.5-2.5 (3) MG/3ML neb solution    DUONEB    360 mL    Take 1 vial (3 mLs) by nebulization every 6 hours    Uncomplicated asthma, unspecified asthma severity       irbesartan 300 MG tablet    AVAPRO    90 tablet    Take 1 tablet (300 mg) by mouth daily    Essential hypertension with goal blood pressure less than 140/90       ketoconazole 2 % shampoo    NIZORAL    120 mL    Apply to the affected area and wash off after 5 minutes.    Localized pruritus       lisinopril 20 MG tablet    PRINIVIL/ZESTRIL    90 tablet    Take 1 tablet (20 mg) by mouth daily    Essential hypertension with goal blood pressure less than 140/90       montelukast 10 MG tablet    SINGULAIR    30 tablet    Take 1 tablet (10 mg) by mouth At Bedtime    Dust allergy, Severe chronic obstructive pulmonary disease (H)       NICORETTE 2 MG lozenge   Generic drug:  nicotine polacrilex      PLACE 1 LOZENGE INSIDE CHEEK Q HOUR PRF SMOKING CESSATION UTD        ondansetron 8 MG tablet    ZOFRAN    30 tablet    Take 0.5-1 tablets (4-8 mg) by mouth every 8 hours as needed for nausea    Nausea        order for DME     1 Device    Equipment being ordered: Nebulizer machine Length of need-lifetime    Chronic lung disease, Cough, Severe persistent asthma with acute exacerbation       oxyCODONE 10 MG IR tablet    ROXICODONE    150 tablet    5 times daily as needed    Chest wall pain, Severe chronic obstructive pulmonary disease (H), Continuous opioid dependence (H)       pantoprazole 20 MG EC tablet    PROTONIX    90 tablet    Take 2 tablets (40 mg) by mouth every morning (before breakfast)    Gastroesophageal reflux disease without esophagitis       predniSONE 10 MG tablet    DELTASONE    18 tablet    take 2 tabs daily for 5 days, then take 1 tab daily for 5 days, then 1/2 tab daily for 6 days    Severe chronic obstructive pulmonary disease (H)       prochlorperazine 5 MG tablet    COMPAZINE    90 tablet    Take 1 tablet (5 mg) by mouth every 6 hours as needed for nausea or vomiting    Nausea       ranitidine 75 MG tablet    ZANTAC    90 tablet    Take 2 tablets (150 mg) by mouth daily    Uncomplicated asthma, unspecified asthma severity       tiotropium 18 MCG capsule    SPIRIVA HANDIHALER    30 capsule    Inhale contents of one capsule daily.    Other emphysema (H)       varenicline 0.5 MG X 11 & 1 MG X 42 tablet    CHANTIX STARTING MONTH CHAPITO    53 tablet    Take 0.5 mg tab daily for 3 days, then 0.5 mg tab twice daily for 4 days, then 1 mg twice daily.    Encounter for smoking cessation counseling       venlafaxine 37.5 MG 24 hr capsule    EFFEXOR-XR    46 capsule    Take 1 capsule daily for 14 days, then take 2 capsules daily.    Menopausal syndrome (hot flashes)       VITAMIN D (CHOLECALCIFEROL) PO      Take 2,000 Units by mouth daily

## 2017-08-18 NOTE — LETTER
"8/18/2017       RE: Swetha Patterson  [address]  [facility] [place] [address]     Dear Colleague,    Thank you for referring your patient, Swetha Patterson, to the [facility]. Please see a copy of my visit note below.    [facility] VOICE CLINIC  Tanner cOhoa Jr., M.D., F.A.C.S.  Lis Talbert M.D., M.P.H.  Kandice Felton, Ph.D., CCC/SLP  Dianne Medel M.M. (voice), M.A., CCC/SLP  Jefferson Swain M.M. (voice), M.A., [facility]/SLP    Patient: Swetha Patterson  Date of Visit: 8/18/2017    CHIEF COMPLAINT: Dysphonia    HISTORY  Swetha Patterson was seen for initial voice evaluation and laryngeal examination in conjunction with a visit to Dr. Talbert.  Please refer to Dr. Talbert s dictation for additional history and impressions. Salient details of her history are as follows:    Ms. Patterson is a 47 year old female with a history of dysphonia.      She also has a more complex medical history including anxiety, chronic lung disease (asthma, emphysema, and chronic bronchitis), reflux, and tobacco use.  Please review Dr. Talbert's report for additional details    CURRENT SYMPTOMS INCLUDE  VOICE    Over two year Hx of voice issues, which were originally treated at the [facility] (we did not have records from [place] at the time of evaluation).    She reports that her treatment at [facility] involved surgery for \"polyps\", as well as a course of speech therapy.    She has since transferred all of her care at our clinic.    Currently, coughing a    She reports that she quit smoking one week ago, in order to move forward with any surgical procedure that may seem necessary.  She has been using Chantix; experiences no side effects with the medication.  COUGH/THROAT CLEARING    Frequent coughing, which also seems to make her swallowing more effortful  SWALLOWING/THROAT SYMPTOMS    Occasional difficulty swallowing solid foods    Things rarely go down the wrong tube.  BREATHING    She is known " "to have \"polyps in her lungs, and has a difficult time breathing when her airway feels \"plugged\"    Denies having an inspiratory stridor, unless she has a URI  ADDITIONAL    Also saw Dr. Mayen in the past for her symptoms.    OTHER PERTINENT HISTORY    Please also refer to Dr. Talbert s dictation for additional history and impressions.  Past Medical History:   Diagnosis Date     Anxiety state, unspecified      Arthritis     right hip arthritis     Asthma      Chronic pain     back pain from cyst     Contact dermatitis and other eczema, due to unspecified cause      COPD (chronic obstructive pulmonary disease) (H)      Depressive disorder, not elsewhere classified      Emphysema with chronic bronchitis (H)      Esophageal reflux      Family history of ischemic heart disease      Gastro-oesophageal reflux disease      History of emphysema      Hoarseness      HTN, goal below 140/90      Hyperlipidemia LDL goal <130      Pneumonia      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)      PONV (postoperative nausea and vomiting)      Past Surgical History:   Procedure Laterality Date     ARTHROSCOPY SHOULDER DECOMPRESSION Left 10/21/2015    Procedure: ARTHROSCOPY SHOULDER DECOMPRESSION;  Surgeon: Julien Milian MD;  Location: [facility] OR     BRONCHIAL THERMOPLASTY N/A 11/14/2014    Procedure: BRONCHIAL THERMOPLASTY;  Surgeon: Ward Whitaker MD;  [facility]     BRONCHIAL THERMOPLASTY N/A 12/19/2014    Procedure: BRONCHIAL THERMOPLASTY;  Surgeon: Ward Whitaker MD;  [facility]     BRONCHIAL THERMOPLASTY N/A 2/6/2015    Procedure: BRONCHIAL THERMOPLASTY;  Surgeon: Ward Whitaker MD;  [facility]     C APPENDECTOMY  at age 17     EXCISE NODE MEDIASTINAL  4/26/2013    Procedure: EXCISE NODE MEDIASTINAL;;  Surgeon: Av Peña MD;  Location:  OR     THORACOSCOPY  4/26/2013    Procedure: THORACOSCOPY;  LEFT VIDEO ASSISTED THORACOSCOPY, RESECTION OF POSTERIOR MEDIASTINAL MASS;  " "Surgeon: Av Peña MD;  Location:  OR     TONSILLECTOMY  as a kid     TONSILLECTOMY         OBJECTIVE  PATIENT REPORTED MEASURES  Patient Supplied Answers To VHI Questionnaire  Voice Handicap Index (VHI-10) 8/18/2017   How often do you have any of the following symptoms:  Indigestion, heartburn, chest pain, stomach acid coming up, and/or tasting acid in your mouth or throat? Weekly   (F1) My voice makes it difficult for people to hear me. Always   (F2) People have difficulty understanding me in a noisy room. Almost always   (F8) My voice difficulties restrict my personal and social life. Always   (F9) I feel left out of conversations because of my voice. Almost always   (F10) My voice problem causes me to lose income. Never   (P5) I feel as though I have to strain to produce voice. Always   (P6) The clarity of my voice is unpredictable. Always   (E4) My voice problem upsets me. Always   (E6) My voice makes me feel handicapped. Sometimes   (P3) People ask, \"What's wrong with your voice?\" Always   VHI Total Score 32       Patient Supplied Answers To CSI Questionnaire  Cough Severity Index (CSI) 8/18/2017   My cough is worse when I lie down. Always   My coughing problem causes me to restrict my personal and social life. Always   I tend to avoid places because of my cough problem. Always   I feel embarrassed because of my coughing problem. Always   People ask, \"What's wrong?\" because I cough a lot. Always   I run out of air when I cough. Always   My coughing problem affects my voice. Always   My coughing problem limits my physical activity. Always   My coughing problem upsets me. Always   People ask me if I am sick because I cough a lot. Almost always   CSI Total Score 39       Patient Supplied Answers To EAT Questionnaire  Eating Assessment Tool (EAT-10) 8/18/2017   My swallowing problem has caused me to lose weight. 1   My swallowing problem interferes with my ability to go out for meals. 0 "   Swallowing solids takes extra effort. 2   Swallowing pills takes extra effort. 1   Swallowing is painful. 4   The pleasure of eating is affected by my swallowing. 1   When I swallow food sticks in my throat. 1   I cough when I eat. 3   Swallowing is stressful. 4   How often do things go down the wrong way when you swallow? Rarely   Have you had pneumonia, bronchitis, or another severe lung infection in the last 6 months? Yes   EAT Total Score 17     PERCEPTUAL EVALUATION (CPT 28082)  POSTURE / TENSION:     upper body    neck and shoulders    BREATHING:     inspirations are inadequate in volume and frequency    clavicular elevation on inspiration    clavicular muscle use pattern     shoulder and neck involvement    noisy on inspiration    shallow    abdominal contraction at initiation of phonation    phonation is not coordinated with respiration    talks to past end of breath stream    LARYNGEAL PALPATION:     firm musculature    tenderness of the thyrohyoid area    reduced thyrohyoid space    VOICE:    Roughness: Mild to moderate    Breathiness: Mild to moderate    Strain: Mild to moderate    Loudness    Conversational speech:  Mildly reduced    Projected speech:  reduced    Pitch:    Conversational speech:  Mild to moderately lowered    Pitch glide: neurologically normal; limited upper range    Resonance:    Conversational speech:  laryngeal pharyngeal resonance    Singing vs. Speech:  same    CAPE-V Overall Severity:  37/100;  Global Dysphonia Severity:  42/100    COUGH/THROAT CLEARING:    Frequent    Dry    Wet    Locus of cough/ throat clear: sounds consistent with upper airway and lower airway     Moderate to severe    LARYNGEAL EXAMINATION  Procedure: Flexible endoscopy with chip-tip technology with stroboscopy, right nostril; topical anesthesia with 3% Lidocaine and 0.25% phenylephrine was applied.   Performed by: Dr. Talbert   The laryngeal and pharyngeal structures were evaluated for gross appearance,  mobility, function, and focal lesions / abnormalities of the associated mucosa.  Stroboscopy was warranted to evaluate closure, symmetry, and vibratory characteristics of the vocal folds.  All findings were within normal limits with the exception of the following salient features:     Bilateral vocal fold lesions at the anterior 1/3rd junction along the vibratory margin; the left is prominent and shallowly enters into the glottis; right vocal fold demonstrates a reactive concave shape along the vibratory margin, with some mild irregular mucosa.    Moderate supraglottic hyperfunction.    Stroboscopy (somewhat difficult to capture secondary to hypersensitivity):    Amplitude: reduced bilaterally    Symmetry: asymmetrical    Phase: regular phase    Hourglass glottic configuration.      Image 1: partially abducted view of irregularities along the vibratory margins of the vocal folds.  Second image: partially adducted view of irregularities along the vibratory margins.    The laryngeal exam was reviewed with Ms. Patterson, and I provided pertinent explanations, as well as written and oral information.    ASSESSMENT / PLAN  IMPRESSIONS: R49.0 (Dysphonia), R05 (Chronic Cough), J38.7 (Irritable Larynx Syndrome) and (pre-nodular edema of the vocal folds)     Laryngeal evaluation demonstrated:    Bilateral vocal fold lesions at the anterior 1/3rd junction along the vibratory margin; the left is prominent and shallowly enters into the glottis; right vocal fold demonstrates a reactive concave shape along the vibratory margin, with some mild irregular mucosa.    Moderate supraglottic hyperfunction.    Stroboscopy (somewhat difficult to capture secondary to hypersensitivity):    Amplitude: reduced bilaterally    Symmetry: asymmetrical    Phase: regular phase    Hourglass glottic configuration.    Dysphonia is accounted for by the vocal fold lesions, and resulting aberrations in vibratory characteristics and mucosal  wave    Dysphonia/discomfort is compounded by the hyperfunction and imbalanced function of the intrinsic and extrinsic laryngeal musculature      Cough/throat clear are accounted for by the hypersensitivity of the larynx and pharynx as evidenced by case history, patient complaints and absence of other organic findings; hypersensitivity is compounded by imbalance in the function of the intrinsic and extrinsic laryngeal musculature during connected speech     Ms. Patterson also has a good awareness of how stress and anxiety can contribute to her symptoms.  STIMULABILITY: results of therapy probes during perceptual and laryngeal evaluation demonstrate improvement with use of forward resonant stimuli, coordination of respiration and phonation and use of yawn sigh  RECOMMENDATIONS:     A course of speech therapy is recommended to optimize vocal technique, improve voice quality, promote reduced discomfort, effort and fatigue, promote reduction of vocal fold impact to help resolve the lesions and help reduce chronic cough, throat clear and mucosal irritation.  Dr. Talbert stressed to her the importance of reducing her laryngeal hypersensitivity, and suppressing her chronic cough and throat clearing prior to any further intervention.    Dr. Talbert would like her to follow up in 3-4 months, and to continue working with her lung specialists.    She demonstrates a Good prognosis for improvement given adherence to therapeutic recommendations. Therapeutic     Positive indicators: motivation    Negative indicators: none    DURATION / FREQUENCY: 2 one-hour weekly sessions, followed by 2 bi-weekly sessions.  A total of 6-8 sessions may be necessary to resolve symptoms to within normal limits.    GOALS:  Patient goal:   To improve and maintain a healthy voice quality  To resolve the vocal fold lesions  To understand the problem and fix it as much as possible  To reduce her cough to acceptable levels  To breathe normally and comfortably in  all situations    Long-term goal(s): In 6 months, Ms. Patterson will:  Report a week of typical vocal activities, in which dysphonia and discomfort do not exceed a level of 3 out of 10, 80% of the time   Report a week with no more than 3 episodes of coughing, that do not last more than 5 seconds  Report a speaking and singing voice quality that is acceptable to her, 90%of the time      G-Codes:   - Functional limitation, current status, at evalution CK - At least 40 percent but less than 60 percent impaired, limited, or restricted   - Functional limitation, projected goal status, at reporting interval CJ - At least 20 percent but less than 40 percent impaired, limited, or restricted  Certification period: August 18, 2017 - 11/16/17    This treatment plan was developed with the patient who agreed with the recommendations.    TOTAL SERVICE:  EVALUATION OF VOICE AND RESONANCE: (89676): 60 minutes    NO CHARGE FACILITY FEE (31828)    Dianne Medel M.M. (voice), M.MALVIN., CCC/SLP  Speech-Language Pathologist  Critical access hospital  [phone]                        [facility]  Tanner Ochoa Jr., M.D., F.A.C.S.  Lis Talbert M.D., M.P.H.  Kandice Felton, Ph.D., CCC/SLP  Dianne Medel M.M. (voice), M.MALVIN., CCC/SLP  Jefferson Swain M.M. (voice), M.A., CCC/SLP    Patient: Swetha Patterson  Date of Visit: 8/18/2017    CHIEF COMPLAINT: Dysphonia    HISTORY  Swetha Patterson was seen for initial voice evaluation and laryngeal examination in conjunction with a visit to Dr. Talbert.  Please refer to Dr. Talbert s dictation for additional history and impressions. Salient details of her history are as follows:    Ms. Patterson is a 47 year old female with a history of dysphonia.      She also has a more complex medical history including anxiety, chronic lung disease (asthma, emphysema, and chronic bronchitis), reflux, and tobacco use.  Please review Dr. Talbert's report for additional details    CURRENT SYMPTOMS  "INCLUDE  VOICE    Over two year Hx of voice issues, which were originally treated at the [facility] (we did not have records from [place] at the time of evaluation).    She reports that her treatment at [facility] involved surgery for \"polyps\", as well as a course of speech therapy.    She has since transferred all of her care at our clinic.    Currently, coughing a    She reports that she quit smoking one week ago, in order to move forward with any surgical procedure that may seem necessary.  She has been using Chantix; experiences no side effects with the medication.  COUGH/THROAT CLEARING    Frequent coughing, which also seems to make her swallowing more effortful  SWALLOWING/THROAT SYMPTOMS    Occasional difficulty swallowing solid foods    Things rarely go down the wrong tube.  BREATHING    She is known to have \"polyps in her lungs, and has a difficult time breathing when her airway feels \"plugged\"    Denies having an inspiratory stridor, unless she has a URI  ADDITIONAL    Also saw Dr. Mayen in the past for her symptoms.    OTHER PERTINENT HISTORY    Please also refer to Dr. Talbert s dictation for additional history and impressions.  Past Medical History:   Diagnosis Date     Anxiety state, unspecified      Arthritis     right hip arthritis     Asthma      Chronic pain     back pain from cyst     Contact dermatitis and other eczema, due to unspecified cause      COPD (chronic obstructive pulmonary disease) (H)      Depressive disorder, not elsewhere classified      Emphysema with chronic bronchitis (H)      Esophageal reflux      Family history of ischemic heart disease      Gastro-oesophageal reflux disease      History of emphysema      Hoarseness      HTN, goal below 140/90      Hyperlipidemia LDL goal <130      Pneumonia      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)      PONV (postoperative nausea and vomiting)      Past Surgical History:   Procedure Laterality Date     ARTHROSCOPY SHOULDER DECOMPRESSION Left " "10/21/2015    Procedure: ARTHROSCOPY SHOULDER DECOMPRESSION;  Surgeon: Julien Milian MD;  Location: [facility] OR     BRONCHIAL THERMOPLASTY N/A 11/14/2014    Procedure: BRONCHIAL THERMOPLASTY;  Surgeon: Ward Whitaker MD;  [facility]     BRONCHIAL THERMOPLASTY N/A 12/19/2014    Procedure: BRONCHIAL THERMOPLASTY;  Surgeon: Ward Whitaker MD;  [facility]     BRONCHIAL THERMOPLASTY N/A 2/6/2015    Procedure: BRONCHIAL THERMOPLASTY;  Surgeon: Ward Whitaker MD;  [facility]     C APPENDECTOMY  at age 17     EXCISE NODE MEDIASTINAL  4/26/2013    Procedure: EXCISE NODE MEDIASTINAL;;  Surgeon: Av Peña MD;  Location: [facility] OR     THORACOSCOPY  4/26/2013    Procedure: THORACOSCOPY;  LEFT VIDEO ASSISTED THORACOSCOPY, RESECTION OF POSTERIOR MEDIASTINAL MASS;  Surgeon: Av Peña MD;  Location: [facility] OR     TONSILLECTOMY  as a kid     TONSILLECTOMY         OBJECTIVE  PATIENT REPORTED MEASURES  Patient Supplied Answers To VHI Questionnaire  Voice Handicap Index (VHI-10) 8/18/2017   How often do you have any of the following symptoms:  Indigestion, heartburn, chest pain, stomach acid coming up, and/or tasting acid in your mouth or throat? Weekly   (F1) My voice makes it difficult for people to hear me. Always   (F2) People have difficulty understanding me in a noisy room. Almost always   (F8) My voice difficulties restrict my personal and social life. Always   (F9) I feel left out of conversations because of my voice. Almost always   (F10) My voice problem causes me to lose income. Never   (P5) I feel as though I have to strain to produce voice. Always   (P6) The clarity of my voice is unpredictable. Always   (E4) My voice problem upsets me. Always   (E6) My voice makes me feel handicapped. Sometimes   (P3) People ask, \"What's wrong with your voice?\" Always   VHI Total Score 32       Patient Supplied Answers To CSI Questionnaire  Cough Severity Index (CSI) 8/18/2017   My " "cough is worse when I lie down. Always   My coughing problem causes me to restrict my personal and social life. Always   I tend to avoid places because of my cough problem. Always   I feel embarrassed because of my coughing problem. Always   People ask, \"What's wrong?\" because I cough a lot. Always   I run out of air when I cough. Always   My coughing problem affects my voice. Always   My coughing problem limits my physical activity. Always   My coughing problem upsets me. Always   People ask me if I am sick because I cough a lot. Almost always   CSI Total Score 39       Patient Supplied Answers To EAT Questionnaire  Eating Assessment Tool (EAT-10) 8/18/2017   My swallowing problem has caused me to lose weight. 1   My swallowing problem interferes with my ability to go out for meals. 0   Swallowing solids takes extra effort. 2   Swallowing pills takes extra effort. 1   Swallowing is painful. 4   The pleasure of eating is affected by my swallowing. 1   When I swallow food sticks in my throat. 1   I cough when I eat. 3   Swallowing is stressful. 4   How often do things go down the wrong way when you swallow? Rarely   Have you had pneumonia, bronchitis, or another severe lung infection in the last 6 months? Yes   EAT Total Score 17     PERCEPTUAL EVALUATION (CPT 19105)  POSTURE / TENSION:     upper body    neck and shoulders    BREATHING:     inspirations are inadequate in volume and frequency    clavicular elevation on inspiration    clavicular muscle use pattern     shoulder and neck involvement    noisy on inspiration    shallow    abdominal contraction at initiation of phonation    phonation is not coordinated with respiration    talks to past end of breath stream    LARYNGEAL PALPATION:     firm musculature    tenderness of the thyrohyoid area    reduced thyrohyoid space    VOICE:    Roughness: Mild to moderate    Breathiness: Mild to moderate    Strain: Mild to moderate    Loudness    Conversational speech:  " Mildly reduced    Projected speech:  reduced    Pitch:    Conversational speech:  Mild to moderately lowered    Pitch glide: neurologically normal; limited upper range    Resonance:    Conversational speech:  laryngeal pharyngeal resonance    Singing vs. Speech:  same    CAPE-V Overall Severity:  37/100;  Global Dysphonia Severity:  42/100    COUGH/THROAT CLEARING:    Frequent    Dry    Wet    Locus of cough/ throat clear: sounds consistent with upper airway and lower airway     Moderate to severe    LARYNGEAL EXAMINATION  Procedure: Flexible endoscopy with chip-tip technology with stroboscopy, right nostril; topical anesthesia with 3% Lidocaine and 0.25% phenylephrine was applied.   Performed by: Dr. Talbert   The laryngeal and pharyngeal structures were evaluated for gross appearance, mobility, function, and focal lesions / abnormalities of the associated mucosa.  Stroboscopy was warranted to evaluate closure, symmetry, and vibratory characteristics of the vocal folds.  All findings were within normal limits with the exception of the following salient features:     Bilateral vocal fold lesions at the anterior 1/3rd junction along the vibratory margin; the left is prominent and shallowly enters into the glottis; right vocal fold demonstrates a reactive concave shape along the vibratory margin, with some mild irregular mucosa.    Moderate supraglottic hyperfunction.    Stroboscopy (somewhat difficult to capture secondary to hypersensitivity):    Amplitude: reduced bilaterally    Symmetry: asymmetrical    Phase: regular phase    Hourglass glottic configuration.      Image 1: partially abducted view of irregularities along the vibratory margins of the vocal folds.  Second image: partially adducted view of irregularities along the vibratory margins.    The laryngeal exam was reviewed with Ms. Patterson, and I provided pertinent explanations, as well as written and oral information.    ASSESSMENT / PLAN  IMPRESSIONS: R49.0  (Dysphonia), R05 (Chronic Cough), J38.7 (Irritable Larynx Syndrome) and (pre-nodular edema of the vocal folds)     Laryngeal evaluation demonstrated:    Bilateral vocal fold lesions at the anterior 1/3rd junction along the vibratory margin; the left is prominent and shallowly enters into the glottis; right vocal fold demonstrates a reactive concave shape along the vibratory margin, with some mild irregular mucosa.    Moderate supraglottic hyperfunction.    Stroboscopy (somewhat difficult to capture secondary to hypersensitivity):    Amplitude: reduced bilaterally    Symmetry: asymmetrical    Phase: regular phase    Hourglass glottic configuration.    Dysphonia is accounted for by the vocal fold lesions, and resulting aberrations in vibratory characteristics and mucosal wave    Dysphonia/discomfort is compounded by the hyperfunction and imbalanced function of the intrinsic and extrinsic laryngeal musculature      Cough/throat clear are accounted for by the hypersensitivity of the larynx and pharynx as evidenced by case history, patient complaints and absence of other organic findings; hypersensitivity is compounded by imbalance in the function of the intrinsic and extrinsic laryngeal musculature during connected speech  STIMULABILITY: results of therapy probes during perceptual and laryngeal evaluation demonstrate improvement with use of forward resonant stimuli, coordination of respiration and phonation and use of yawn sigh  RECOMMENDATIONS:     A course of speech therapy is recommended to optimize vocal technique, improve voice quality, promote reduced discomfort, effort and fatigue, promote reduction of vocal fold impact to help resolve the lesions and help reduce chronic cough, throat clear and mucosal irritation.    She demonstrates a Good prognosis for improvement given adherence to therapeutic recommendations. Therapeutic     Positive indicators: motivation    Negative indicators: none    DURATION / FREQUENCY:  2 one-hour weekly sessions, followed by 2 bi-weekly sessions.  A total of 6-8 sessions may be necessary to resolve symptoms to within normal limits.    GOALS:  Patient goal:   To improve and maintain a healthy voice quality  To resolve the vocal fold lesions  To understand the problem and fix it as much as possible  To reduce her cough to acceptable levels  To breathe normally and comfortably in all situations    Long-term goal(s): In 6 months, Ms. Patterson will:  Report a week of typical vocal activities, in which dysphonia and discomfort do not exceed a level of 3 out of 10, 80% of the time   Report a week with no more than 3 episodes of coughing, that do not last more than 5 seconds  Report a speaking and singing voice quality that is acceptable to her, 90%of the time      G-Codes:   - Functional limitation, current status, at evalution CK - At least 40 percent but less than 60 percent impaired, limited, or restricted   - Functional limitation, projected goal status, at reporting interval CJ - At least 20 percent but less than 40 percent impaired, limited, or restricted  Certification period: August 18, 2017 - 11/16/17    This treatment plan was developed with the patient who agreed with the recommendations.    TOTAL SERVICE:  EVALUATION OF VOICE AND RESONANCE: (28251): 60 minutes    NO CHARGE FACILITY FEE (82507)    Dianne Medel M.M. (voice) MKAYLEE, CCC/SLP  Speech-Language Pathologist  [facility]  [phone]                                                                                                         Outpatient Speech Language Therapy Evaluation  PLAN OF TREATMENT FOR OUTPATIENT REHABILITATION  (COMPLETE FOR INITIAL CLAIMS ONLY)  Patient's Last Name, First Name, M.I.  YOB: 1970  Swetha Patterson                        Provider's Name  Dianne Medel Medical Record No.  [id number]                               Onset Date:  8/18/17   Start of Care Date: 8/18/17      Type: Speech Language Therapy Medical Diagnosis: Dysphonia [R49.0]                        Therapy Diagnosis:  Dysphonia [R49.0]   Visits from SOC:  1   _________________________________________________________________________________  Plan of Treatment:   Speech therapy    DURATION/FREQUENCY OF TREATMENT  Six weekly, one-hour sessions, with two monthly one-hour follow-up sessions    PROGNOSIS  Good prognosis for voice improvement with speech therapy and regular practice of therapeutic activities.    BARRIERS TO LEARNING/TEACHING AND LEARNING NEEDS  None/Unremarkable    GOALS:  Patient goal:   2. To improve and maintain a healthy voice quality  3. To resolve the vocal fold lesions  4. To understand the problem and fix it as much as possible  5. To reduce her cough to acceptable levels  6. To breathe normally and comfortably in all situations     Long-term goal(s): In 6 months, Ms. Patterson will:  1. Report a week of typical vocal activities, in which dysphonia and discomfort do not exceed a level of 3 out of 10, 80% of the time   2. Report a week with no more than 3 episodes of coughing, that do not last more than 5 seconds  Report a speaking and singing voice quality that is acceptable to her, 90%of the time_______________________________________________    I CERTIFY THE NEED FOR THESE SERVICES FURNISHED UNDER        THIS PLAN OF TREATMENT AND WHILE UNDER MY CARE     (Physician attestation of this document indicates review and certification of the therapy plan).     Certification date from: 8/18/17  Certification date to: 11/16/17    Referring Provider: Lis Talbert         Physician Attestation  I agree with the information in this note.    Lis Talbert MD MPH

## 2017-08-18 NOTE — LETTER
8/18/2017      RE: Swetha Patterson  [address]  [facility] [address]       Dear Dr. Mayen:    I had the pleasure of meeting Swetha Patterson in consultation at the [facility] of the [facility] at your request, for evaluation of multiple throat concerns. The note from our visit follows.  I appreciate the opportunity to participate in the care of this pleasant patient.    Please feel free to contact me with any questions.    Sincerely yours,    Lis Talbert M.D., M.P.H.  , Laryngology  Director, [facility]  Otolaryngology- Head & Neck Surgery  [phone]          =====    History of Present Illness:   Swetha Patterson is a pleasant 47-year-old female with a history of anxiety, chronic lung disease (asthma, emphysema, chronic bronchitis), mediastinal cyst, opioid dependence, reflux and tobacco exposure who presents with a history of throat concerns. Symptoms include increased effort to talk/sing, throat irritation, throat tightness, pain/ache in throat, frequent throat-clearing, frequent cough, shortness of breath, poor voice quality, voice tires easily, voice breaks, change in vocal pitch, change in vocal volume, change in range, breathy voice, raspy voice and scratchy voice.      Voice  She reports a greater than two year history of voice problems.  The onset was gradual.  She does note some inconsistent voice quality as well.  She feels her voice is worse over time.  She has routine voice use.     She has previously seen Dr. Dipti Mayen for epistaxis and vocal fold polyps. She had some polyps removed at [place] years ago, and she is not sure if they were papilloma or Sue's edema or other. Dr. Mayen had recommended seeing the voice team a number of times in the past as well. The patient also reports that she has polyps in her mouth, along her palate.      Swallowing  She feels like it is difficult to swallow  solids sometimes. Things rarely get down the wrong way. Her throat chronically feels irritated. She does experience increased swallowing effort with solids and pills.      Cough/Throat-clearing  She states a year history of cough/throat clearing.  She feels like there is a tickle in her throat prior to onset.  Triggers include cold air, talking, laughing, perfumes or strong smells, humid air, stress and lying down.   She has previously been treated with oral steroids.      Breathing  She does have baseline breathing difficulty related to lung disease.  She also has a history of pulmonary nodules. She follows with Dr. Williamson, and anticipates updated imaging over the next few months.   She also reports both inspiratory and expiratory difficulty.  She has needed to go to the Emergency Room for breathing problems.  She also feels that one of her breathing passages is blocked and would like help getting it fixed.      Throat discomfort  She reports throat discomfort sometimes like she has some throat burning.      Reflux-type symptoms  She experiences heartburn/indigestion monthly. She is taking reflux medications.      Prior EPIC records were reviewed for this visit. She started Chantix a week ago, and is trying to quit smoking. She feels overall that she is just not feeling good. She has been having emesis and sometimes there are clots in there.      MEDICATIONS:     Current Outpatient Prescriptions   Medication Sig Dispense Refill     varenicline (CHANTIX STARTING MONTH CHAPITO) 0.5 MG X 11 & 1 MG X 42 tablet Take 0.5 mg tab daily for 3 days, then 0.5 mg tab twice daily for 4 days, then 1 mg twice daily. 53 tablet 0     predniSONE (DELTASONE) 10 MG tablet take 2 tabs daily for 5 days, then take 1 tab daily for 5 days, then 1/2 tab daily for 6 days 18 tablet 0     oxyCODONE (ROXICODONE) 10 MG IR tablet 5 times daily as needed 150 tablet 0     diazepam (VALIUM) 10 MG tablet Take 1 tablet (10 mg) by mouth every 8 hours as  needed for anxiety or sleep For sleep, anxiety, and muscle tension. 90 tablet 0     hydrOXYzine (ATARAX) 50 MG tablet Take 1 tablet (50 mg) by mouth 4 times daily as needed for anxiety Increased dose. For anxiety 120 tablet 1     escitalopram (LEXAPRO) 20 MG tablet Take 1 tablet (20 mg) by mouth daily Increased dose. 90 tablet 1     doxepin (SINEQUAN) 10 MG capsule Take 1 capsule (10 mg) by mouth At Bedtime 30 capsule 1     venlafaxine (EFFEXOR-XR) 37.5 MG 24 hr capsule Take 1 capsule daily for 14 days, then take 2 capsules daily. 46 capsule 0     estradiol (ESTRACE VAGINAL) 0.1 MG/GM cream 1 gm pv nightly at bedtime for 2 weeks, then twice a week at bedtime for maintenance. 42.5 g 6     doxycycline Monohydrate 100 MG TABS One pill by mouth twice daily 42 tablet 0     ondansetron (ZOFRAN) 8 MG tablet Take 0.5-1 tablets (4-8 mg) by mouth every 8 hours as needed for nausea 30 tablet 1     fluticasone (FLONASE) 50 MCG/ACT spray Spray 2 sprays into both nostrils daily 16 g 11     ranitidine (ZANTAC) 75 MG tablet Take 2 tablets (150 mg) by mouth daily 90 tablet 1     cetirizine HCl 10 MG CAPS Take 1 capsule by mouth daily 90 capsule 3     atorvastatin (LIPITOR) 80 MG tablet Take 1 tablet (80 mg) by mouth daily 90 tablet 1     amLODIPine (NORVASC) 10 MG tablet Take 1 tablet (10 mg) by mouth daily 90 tablet 1     lisinopril (PRINIVIL/ZESTRIL) 20 MG tablet Take 1 tablet (20 mg) by mouth daily 90 tablet 1     irbesartan (AVAPRO) 300 MG tablet Take 1 tablet (300 mg) by mouth daily 90 tablet 1     montelukast (SINGULAIR) 10 MG tablet Take 1 tablet (10 mg) by mouth At Bedtime 30 tablet 1     furosemide (LASIX) 20 MG tablet Take 1 tablet (20 mg) by mouth daily as needed 90 tablet 1     NICORETTE 2 MG lozenge PLACE 1 LOZENGE INSIDE CHEEK Q HOUR PRF SMOKING CESSATION UTD  1     budesonide-formoterol (SYMBICORT) 160-4.5 MCG/ACT Inhaler Inhale 2 puffs into the lungs 2 times daily 3 Inhaler 3     ipratropium - albuterol 0.5 mg/2.5  mg/3 mL (DUONEB) 0.5-2.5 (3) MG/3ML neb solution Take 1 vial (3 mLs) by nebulization every 6 hours 360 mL 0     tiotropium (SPIRIVA HANDIHALER) 18 MCG capsule Inhale contents of one capsule daily. 30 capsule 1     albuterol (VENTOLIN HFA) 108 (90 BASE) MCG/ACT Inhaler Inhale 2 puffs into the lungs every 6 hours 3 Inhaler 3     ketoconazole (NIZORAL) 2 % shampoo Apply to the affected area and wash off after 5 minutes. 120 mL 1     desonide (DESOWEN) 0.05 % cream Apply sparingly to affected area three times daily as needed. 60 g 0     pantoprazole (PROTONIX) 20 MG tablet Take 2 tablets (40 mg) by mouth every morning (before breakfast) 90 tablet 3     prochlorperazine (COMPAZINE) 5 MG tablet Take 1 tablet (5 mg) by mouth every 6 hours as needed for nausea or vomiting 90 tablet 0     order for DME Equipment being ordered: Nebulizer machine  Length of need-lifetime 1 Device 0     clindamycin-benzoyl peroxide (BENZACLIN) gel Apply topically 2 times daily 100 g 3     chlorhexidine (HIBICLENS) 4 % external liquid Wash ab, groin, thighs daily in shower DO NOT PUT ON FACE 946 mL 3     VITAMIN D, CHOLECALCIFEROL, PO Take 2,000 Units by mouth daily         ALLERGIES:    Allergies   Allergen Reactions     Codeine      vomiting     Hydrocodone Nausea and Vomiting     Sulfa Drugs      Nausea     Gabapentin Rash       PAST MEDICAL HISTORY:   Past Medical History:   Diagnosis Date     Anxiety state, unspecified      Arthritis     right hip arthritis     Asthma      Chronic pain     back pain from cyst     Contact dermatitis and other eczema, due to unspecified cause      COPD (chronic obstructive pulmonary disease) (H)      Depressive disorder, not elsewhere classified      Emphysema with chronic bronchitis (H)      Esophageal reflux      Family history of ischemic heart disease      Gastro-oesophageal reflux disease      History of emphysema      Hoarseness      HTN, goal below 140/90      Hyperlipidemia LDL goal <130       Pneumonia      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)      PONV (postoperative nausea and vomiting)         PAST SURGICAL HISTORY:   Past Surgical History:   Procedure Laterality Date     ARTHROSCOPY SHOULDER DECOMPRESSION Left 10/21/2015    Procedure: ARTHROSCOPY SHOULDER DECOMPRESSION;  Surgeon: Julien Milian MD;  Location: [facility] OR     BRONCHIAL THERMOPLASTY N/A 11/14/2014    Procedure: BRONCHIAL THERMOPLASTY;  Surgeon: Ward Whitaker MD;  Location: [facility]     BRONCHIAL THERMOPLASTY N/A 12/19/2014    Procedure: BRONCHIAL THERMOPLASTY;  Surgeon: Ward Whitaker MD;  Location: [facility] OR     BRONCHIAL THERMOPLASTY N/A 2/6/2015    Procedure: BRONCHIAL THERMOPLASTY;  Surgeon: Ward Whitaekr MD;  Location: [facility] OR     C APPENDECTOMY  at age 17     EXCISE NODE MEDIASTINAL  4/26/2013    Procedure: EXCISE NODE MEDIASTINAL;;  Surgeon: Av Peña MD;  Location:  OR     THORACOSCOPY  4/26/2013    Procedure: THORACOSCOPY;  LEFT VIDEO ASSISTED THORACOSCOPY, RESECTION OF POSTERIOR MEDIASTINAL MASS;  Surgeon: Av Peña MD;  Location:  OR     TONSILLECTOMY  as a kid     TONSILLECTOMY         HABITS/SOCIAL HISTORY:    Social History   Substance Use Topics     Smoking status: Light Tobacco Smoker     Years: 30.00     Types: Cigarettes     Last attempt to quit: 9/1/2015     Smokeless tobacco: Never Used      Comment: off/on 2 cigs per day     Alcohol use No         FAMILY HISTORY:    Family History   Problem Relation Age of Onset     HEART DISEASE Mother      murmur, mi     Hypertension Mother      CEREBROVASCULAR DISEASE Mother      Depression Mother      Gallbladder Disease Mother      Asthma Mother      Family History Negative Father      does not know  him     HEART DISEASE Sister      murmur, hole in heart     Family History Negative Brother      HEART DISEASE Maternal Grandfather      Asthma Maternal Grandfather      Asthma Sister      HEART DISEASE Sister        REVIEW OF  SYSTEMS:  The patient completed a comprehensive 11 point review of systems (below), which was reviewed. Positives are as noted below; pertinent findings are as noted in the HPI.     Patient Supplied Answers to Review of Systems  [facility] ENT ROS 8/18/2017   Constitutional Weight gain   Neurology Unexplained weakness   Psychology Frequently feeling anxious   Eyes Visual loss   Ears, Nose, Throat Nasal congestion or drainage, Sore throat, Trouble swallowing, Hoarseness, Coughing up blood   Cardiopulmonary Cough, Breathing problems, Wheezing, Chest pain   Gastrointestinal/Genitourinary Unexplained nausea/vomiting   Musculoskeletal Sore or stiff joints, Neck pain   Allergy/Immunology -   Hematologic -   Endocrine Heat or cold intolerance       PHYSICAL EXAMINATION:  General: The patient was alert and conversant, and in no acute distress.    Eyes: PERRL, conjunctiva and lids normal, sclera nonicteric.  Nose: Anterior rhinoscopy: no gross abnormalities. no  bleeding; no  mucopurulence; septum grossly normal, mild mucoid drainage and/or crusting.  Oral cavity/oropharynx: No masses or lesions. Dentition in fair condition. Floor of mouth and oral tongue soft to palpation. Tongue mobility and palate elevation intact and symmetric.  Ears: Normal auricles, external auditory canals bilaterally. Visualized portions of tympanic membranes normal bilaterally.   Neck: No palpable cervical lymphadenopathy. There was moderate  tenderness to palpation of the thyrohyoid space, which was narrow. No obvious thyroid abnormality. Landmarks somewhat indistinct due to habitus, but palpable.  Resp: Breathing comfortably, no stridor or stertor.  Neuro: Symmetric facial function. Other cranial nerves as documented above.  Psych: Normal affect, pleasant and cooperative.  Voice/speech: Mild dysphonia characterized by breathiness, roughness, strain and glottal michael.  Extremities: No cyanosis, clubbing, or edema of the upper extremities.    Intake  scores  Total Score for Last Patient-Answered VHI Questionnaire  VHI Total Score 8/18/2017   VHI Total Score 32     Total Score for Last Patient-Answered EAT Questionnaire  EAT Total Score 8/18/2017   EAT Total Score 17     Total Score for Last Patient-Answered CSI Questionnaire  CSI Total Score 8/18/2017   CSI Total Score 39       PROCEDURE:   Flexible fiberoptic laryngoscopy and laryngovideostroboscopy  Indications: This procedure was warranted to evaluate the patient's laryngeal function. Risks, benefits, and alternatives were discussed.  Description: After written informed consent was obtained, a time-out was performed to confirm patient identity, procedure, and procedure site. Topical 3% lidocaine with 0.25% phenylephrine was applied to the nasal cavities. I performed the endoscopy and no complications were apparent. Continuous and stroboscopic light were utilized to assess routine phonation and variable frequency phonation.  Performed by: Lis Talbert MD MPH  SLP: Dianne Medel MM, MA, CCC-SLP  Findings: Normal nasopharynx. Normal base of tongue, valleculae, and epiglottis. Vocal fold mobility: right: normal; left: normal. Medial edges of the vocal folds: smooth with moderate prominence of the mid vocal fold. No focal mucosal lesions were observed on the true vocal folds. Glissade produced appropriate elongation. There was moderate supraglottic recruitment with connected speech. Mucosa of false vocal folds, aryepiglottic folds, piriform sinuses, and posterior glottis unremarkable. Airway was patent. Mild diffuse redundancy of arytenoid and post-cricoid mucosa. Highly irritable larynx, limiting exam to some extent. No paradoxical vocal fold motion even when gagging/coughing.  The addition of stroboscopy allowed evaluation of the mucosal wave.   Amplitude: right: mildly decreased; left: mildly decreased. Symmetry: intermittent symmetry. Closure pattern: hourglass-shaped. Closure plane: at glottic level.  Phase distribution: normal.      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 We will obtain records from [place] for further information.  Her airway is patent. I did not see polyps in her oral cavity or oropharynx.     We discussed that cough can often be multifactorial, particularly given her history of reflux and pulmonary disease, but frequently has a component of laryngeal hyperreactivity that is perpetuated by the ongoing vocal fold trauma. I recommended speech therapy as initial primary management, with goals including improving laryngeal hygiene, reducing laryngeal irritability, decreasing vocal fold trauma and improving respiratory/phonatory coordination.   I expect this to help with her voice and swallow symptoms by reducing muscle tightness and improving efficiency of laryngeal movement. If dysphagia persists will plan VFSS with esophagram.    We also discussed that some of her throat symptoms may be exacerbated by stress. She is having a stressful time now, but is determined to quit smoking, which will also help reduce her throat irritation.    Given her report of bloody emesis and general malaise I also encouraged her to see her primary care provider and/or seek more urgent or emergent care, particularly if there is progression of her symptoms.    She will return in approximately four months for a repeat laryngoscopy or sooner as needed. I appreciate the opportunity to participate in the care of this pleasant patient.       Lis Talbert MD

## 2017-08-18 NOTE — PROGRESS NOTES
Dear Dr. Mayen:    I had the pleasure of meeting Swetha Patterson in consultation at the [facility] of the [facility] at your request, for evaluation of multiple throat concerns. The note from our visit follows.  I appreciate the opportunity to participate in the care of this pleasant patient.    Please feel free to contact me with any questions.    Sincerely yours,    Lis Talbert M.D., M.P.H.  , Laryngology  Director, [facility]  Otolaryngology- Head & Neck Surgery  [phone]          =====    History of Present Illness:   Swetha Patterson is a pleasant 47-year-old female with a history of anxiety, chronic lung disease (asthma, emphysema, chronic bronchitis), mediastinal cyst, opioid dependence, reflux and tobacco exposure who presents with a history of throat concerns. Symptoms include increased effort to talk/sing, throat irritation, throat tightness, pain/ache in throat, frequent throat-clearing, frequent cough, shortness of breath, poor voice quality, voice tires easily, voice breaks, change in vocal pitch, change in vocal volume, change in range, breathy voice, raspy voice and scratchy voice.      Voice  She reports a greater than two year history of voice problems.  The onset was gradual.  She does note some inconsistent voice quality as well.  She feels her voice is worse over time.  She has routine voice use.     She has previously seen Dr. Dipti Mayen for epistaxis and vocal fold polyps. She had some polyps removed at [facility] years ago, and she is not sure if they were papilloma or Sue's edema or other. Dr. Mayen had recommended seeing the voice team a number of times in the past as well. The patient also reports that she has polyps in her mouth, along her palate.      Swallowing  She feels like it is difficult to swallow solids sometimes. Things rarely get down the wrong way. Her throat chronically  feels irritated. She does experience increased swallowing effort with solids and pills.      Cough/Throat-clearing  She states a year history of cough/throat clearing.  She feels like there is a tickle in her throat prior to onset.  Triggers include cold air, talking, laughing, perfumes or strong smells, humid air, stress and lying down.   She has previously been treated with oral steroids.      Breathing  She does have baseline breathing difficulty related to lung disease.  She also has a history of pulmonary nodules. She follows with Dr. Williamson, and anticipates updated imaging over the next few months.   She also reports both inspiratory and expiratory difficulty.  She has needed to go to the Emergency Room for breathing problems.  She also feels that one of her breathing passages is blocked and would like help getting it fixed.      Throat discomfort  She reports throat discomfort sometimes like she has some throat burning.      Reflux-type symptoms  She experiences heartburn/indigestion monthly. She is taking reflux medications.      Prior EPIC records were reviewed for this visit. She started Chantix a week ago, and is trying to quit smoking. She feels overall that she is just not feeling good. She has been having emesis and sometimes there are clots in there.      MEDICATIONS:     Current Outpatient Prescriptions   Medication Sig Dispense Refill     varenicline (CHANTIX STARTING MONTH CHAPITO) 0.5 MG X 11 & 1 MG X 42 tablet Take 0.5 mg tab daily for 3 days, then 0.5 mg tab twice daily for 4 days, then 1 mg twice daily. 53 tablet 0     predniSONE (DELTASONE) 10 MG tablet take 2 tabs daily for 5 days, then take 1 tab daily for 5 days, then 1/2 tab daily for 6 days 18 tablet 0     oxyCODONE (ROXICODONE) 10 MG IR tablet 5 times daily as needed 150 tablet 0     diazepam (VALIUM) 10 MG tablet Take 1 tablet (10 mg) by mouth every 8 hours as needed for anxiety or sleep For sleep, anxiety, and muscle tension. 90 tablet 0      hydrOXYzine (ATARAX) 50 MG tablet Take 1 tablet (50 mg) by mouth 4 times daily as needed for anxiety Increased dose. For anxiety 120 tablet 1     escitalopram (LEXAPRO) 20 MG tablet Take 1 tablet (20 mg) by mouth daily Increased dose. 90 tablet 1     doxepin (SINEQUAN) 10 MG capsule Take 1 capsule (10 mg) by mouth At Bedtime 30 capsule 1     venlafaxine (EFFEXOR-XR) 37.5 MG 24 hr capsule Take 1 capsule daily for 14 days, then take 2 capsules daily. 46 capsule 0     estradiol (ESTRACE VAGINAL) 0.1 MG/GM cream 1 gm pv nightly at bedtime for 2 weeks, then twice a week at bedtime for maintenance. 42.5 g 6     doxycycline Monohydrate 100 MG TABS One pill by mouth twice daily 42 tablet 0     ondansetron (ZOFRAN) 8 MG tablet Take 0.5-1 tablets (4-8 mg) by mouth every 8 hours as needed for nausea 30 tablet 1     fluticasone (FLONASE) 50 MCG/ACT spray Spray 2 sprays into both nostrils daily 16 g 11     ranitidine (ZANTAC) 75 MG tablet Take 2 tablets (150 mg) by mouth daily 90 tablet 1     cetirizine HCl 10 MG CAPS Take 1 capsule by mouth daily 90 capsule 3     atorvastatin (LIPITOR) 80 MG tablet Take 1 tablet (80 mg) by mouth daily 90 tablet 1     amLODIPine (NORVASC) 10 MG tablet Take 1 tablet (10 mg) by mouth daily 90 tablet 1     lisinopril (PRINIVIL/ZESTRIL) 20 MG tablet Take 1 tablet (20 mg) by mouth daily 90 tablet 1     irbesartan (AVAPRO) 300 MG tablet Take 1 tablet (300 mg) by mouth daily 90 tablet 1     montelukast (SINGULAIR) 10 MG tablet Take 1 tablet (10 mg) by mouth At Bedtime 30 tablet 1     furosemide (LASIX) 20 MG tablet Take 1 tablet (20 mg) by mouth daily as needed 90 tablet 1     NICORETTE 2 MG lozenge PLACE 1 LOZENGE INSIDE CHEEK Q HOUR PRF SMOKING CESSATION UTD  1     budesonide-formoterol (SYMBICORT) 160-4.5 MCG/ACT Inhaler Inhale 2 puffs into the lungs 2 times daily 3 Inhaler 3     ipratropium - albuterol 0.5 mg/2.5 mg/3 mL (DUONEB) 0.5-2.5 (3) MG/3ML neb solution Take 1 vial (3 mLs) by  nebulization every 6 hours 360 mL 0     tiotropium (SPIRIVA HANDIHALER) 18 MCG capsule Inhale contents of one capsule daily. 30 capsule 1     albuterol (VENTOLIN HFA) 108 (90 BASE) MCG/ACT Inhaler Inhale 2 puffs into the lungs every 6 hours 3 Inhaler 3     ketoconazole (NIZORAL) 2 % shampoo Apply to the affected area and wash off after 5 minutes. 120 mL 1     desonide (DESOWEN) 0.05 % cream Apply sparingly to affected area three times daily as needed. 60 g 0     pantoprazole (PROTONIX) 20 MG tablet Take 2 tablets (40 mg) by mouth every morning (before breakfast) 90 tablet 3     prochlorperazine (COMPAZINE) 5 MG tablet Take 1 tablet (5 mg) by mouth every 6 hours as needed for nausea or vomiting 90 tablet 0     order for DME Equipment being ordered: Nebulizer machine  Length of need-lifetime 1 Device 0     clindamycin-benzoyl peroxide (BENZACLIN) gel Apply topically 2 times daily 100 g 3     chlorhexidine (HIBICLENS) 4 % external liquid Wash ab, groin, thighs daily in shower DO NOT PUT ON FACE 946 mL 3     VITAMIN D, CHOLECALCIFEROL, PO Take 2,000 Units by mouth daily         ALLERGIES:    Allergies   Allergen Reactions     Codeine      vomiting     Hydrocodone Nausea and Vomiting     Sulfa Drugs      Nausea     Gabapentin Rash       PAST MEDICAL HISTORY:   Past Medical History:   Diagnosis Date     Anxiety state, unspecified      Arthritis     right hip arthritis     Asthma      Chronic pain     back pain from cyst     Contact dermatitis and other eczema, due to unspecified cause      COPD (chronic obstructive pulmonary disease) (H)      Depressive disorder, not elsewhere classified      Emphysema with chronic bronchitis (H)      Esophageal reflux      Family history of ischemic heart disease      Gastro-oesophageal reflux disease      History of emphysema      Hoarseness      HTN, goal below 140/90      Hyperlipidemia LDL goal <130      Pneumonia      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)      PONV  (postoperative nausea and vomiting)         PAST SURGICAL HISTORY:   Past Surgical History:   Procedure Laterality Date     ARTHROSCOPY SHOULDER DECOMPRESSION Left 10/21/2015    Procedure: ARTHROSCOPY SHOULDER DECOMPRESSION;  Surgeon: Julien Milian MD;  Location: [facility] OR     BRONCHIAL THERMOPLASTY N/A 11/14/2014    Procedure: BRONCHIAL THERMOPLASTY;  Surgeon: Ward Whitaker MD;  Location: [facility]     BRONCHIAL THERMOPLASTY N/A 12/19/2014    Procedure: BRONCHIAL THERMOPLASTY;  Surgeon: Ward Whitaker MD;  Location: [facility]     BRONCHIAL THERMOPLASTY N/A 2/6/2015    Procedure: BRONCHIAL THERMOPLASTY;  Surgeon: Ward Whitaker MD;  Location: [facility]     C APPENDECTOMY  at age 17     EXCISE NODE MEDIASTINAL  4/26/2013    Procedure: EXCISE NODE MEDIASTINAL;;  Surgeon: Av Peña MD;  Location:  OR     THORACOSCOPY  4/26/2013    Procedure: THORACOSCOPY;  LEFT VIDEO ASSISTED THORACOSCOPY, RESECTION OF POSTERIOR MEDIASTINAL MASS;  Surgeon: Av Peña MD;  Location:  OR     TONSILLECTOMY  as a kid     TONSILLECTOMY         HABITS/SOCIAL HISTORY:    Social History   Substance Use Topics     Smoking status: Light Tobacco Smoker     Years: 30.00     Types: Cigarettes     Last attempt to quit: 9/1/2015     Smokeless tobacco: Never Used      Comment: off/on 2 cigs per day     Alcohol use No         FAMILY HISTORY:    Family History   Problem Relation Age of Onset     HEART DISEASE Mother      murmur, mi     Hypertension Mother      CEREBROVASCULAR DISEASE Mother      Depression Mother      Gallbladder Disease Mother      Asthma Mother      Family History Negative Father      does not know  him     HEART DISEASE Sister      murmur, hole in heart     Family History Negative Brother      HEART DISEASE Maternal Grandfather      Asthma Maternal Grandfather      Asthma Sister      HEART DISEASE Sister        REVIEW OF SYSTEMS:  The patient completed a comprehensive 11 point review of  systems (below), which was reviewed. Positives are as noted below; pertinent findings are as noted in the HPI.     Patient Supplied Answers to Review of Systems   ENT ROS 8/18/2017   Constitutional Weight gain   Neurology Unexplained weakness   Psychology Frequently feeling anxious   Eyes Visual loss   Ears, Nose, Throat Nasal congestion or drainage, Sore throat, Trouble swallowing, Hoarseness, Coughing up blood   Cardiopulmonary Cough, Breathing problems, Wheezing, Chest pain   Gastrointestinal/Genitourinary Unexplained nausea/vomiting   Musculoskeletal Sore or stiff joints, Neck pain   Allergy/Immunology -   Hematologic -   Endocrine Heat or cold intolerance       PHYSICAL EXAMINATION:  General: The patient was alert and conversant, and in no acute distress.    Eyes: PERRL, conjunctiva and lids normal, sclera nonicteric.  Nose: Anterior rhinoscopy: no gross abnormalities. no  bleeding; no  mucopurulence; septum grossly normal, mild mucoid drainage and/or crusting.  Oral cavity/oropharynx: No masses or lesions. Dentition in fair condition. Floor of mouth and oral tongue soft to palpation. Tongue mobility and palate elevation intact and symmetric.  Ears: Normal auricles, external auditory canals bilaterally. Visualized portions of tympanic membranes normal bilaterally.   Neck: No palpable cervical lymphadenopathy. There was moderate  tenderness to palpation of the thyrohyoid space, which was narrow. No obvious thyroid abnormality. Landmarks somewhat indistinct due to habitus, but palpable.  Resp: Breathing comfortably, no stridor or stertor.  Neuro: Symmetric facial function. Other cranial nerves as documented above.  Psych: Normal affect, pleasant and cooperative.  Voice/speech: Mild dysphonia characterized by breathiness, roughness, strain and glottal michael.  Extremities: No cyanosis, clubbing, or edema of the upper extremities.    Intake scores  Total Score for Last Patient-Answered VHI Questionnaire  VHI Total  Score 8/18/2017   VHI Total Score 32     Total Score for Last Patient-Answered EAT Questionnaire  EAT Total Score 8/18/2017   EAT Total Score 17     Total Score for Last Patient-Answered CSI Questionnaire  CSI Total Score 8/18/2017   CSI Total Score 39       PROCEDURE:   Flexible fiberoptic laryngoscopy and laryngovideostroboscopy  Indications: This procedure was warranted to evaluate the patient's laryngeal function. Risks, benefits, and alternatives were discussed.  Description: After written informed consent was obtained, a time-out was performed to confirm patient identity, procedure, and procedure site. Topical 3% lidocaine with 0.25% phenylephrine was applied to the nasal cavities. I performed the endoscopy and no complications were apparent. Continuous and stroboscopic light were utilized to assess routine phonation and variable frequency phonation.  Performed by: Lis Talbert MD MPH  SLP: Dianne Medel MM, MA, CCC-SLP  Findings: Normal nasopharynx. Normal base of tongue, valleculae, and epiglottis. Vocal fold mobility: right: normal; left: normal. Medial edges of the vocal folds: smooth with moderate prominence of the mid vocal fold. No focal mucosal lesions were observed on the true vocal folds. Glissade produced appropriate elongation. There was moderate supraglottic recruitment with connected speech. Mucosa of false vocal folds, aryepiglottic folds, piriform sinuses, and posterior glottis unremarkable. Airway was patent. Mild diffuse redundancy of arytenoid and post-cricoid mucosa. Highly irritable larynx, limiting exam to some extent. No paradoxical vocal fold motion even when gagging/coughing.  The addition of stroboscopy allowed evaluation of the mucosal wave.   Amplitude: right: mildly decreased; left: mildly decreased. Symmetry: intermittent symmetry. Closure pattern: hourglass-shaped. Closure plane: at glottic level. Phase distribution: normal.      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 We will obtain records from [facility] for further information.  Her airway is patent. I did not see polyps in her oral cavity or oropharynx.     We discussed that cough can often be multifactorial, particularly given her history of reflux and pulmonary disease, but frequently has a component of laryngeal hyperreactivity that is perpetuated by the ongoing vocal fold trauma. I recommended speech therapy as initial primary management, with goals including improving laryngeal hygiene, reducing laryngeal irritability, decreasing vocal fold trauma and improving respiratory/phonatory coordination.   I expect this to help with her voice and swallow symptoms by reducing muscle tightness and improving efficiency of laryngeal movement. If dysphagia persists will plan VFSS with esophagram.    We also discussed that some of her throat symptoms may be exacerbated by stress. She is having a stressful time now, but is determined to quit smoking, which will also help reduce her throat irritation.    Given her report of bloody emesis and general malaise I also encouraged her to see her primary care provider and/or seek more urgent or emergent care, particularly if there is progression of her symptoms.    She will return in approximately four months for a repeat laryngoscopy or sooner as needed. I appreciate the opportunity to participate in the care of this pleasant patient.

## 2017-08-18 NOTE — MR AVS SNAPSHOT
After Visit Summary   8/18/2017    Swetha Patterson    MRN: [id number]           Patient Information     Date Of Birth          1970        Visit Information        Provider Department      8/18/2017 10:00 AM Lis Talbert MD [facility] Ear Nose and Th[facility]        Today's Diagnoses     Sue's edema of vocal folds    -  1    Dysphonia        Irritable larynx        Tobacco use          Care Instructions    1.  You were seen in the ENT Clinic today by Dr. Talbert.  If you have any questions or concerns after your appointment, please call [phone].  Press option #1 for scheduling related needs.  Press option #3 for Nurse advice.  2.  Plan is to return to clinic in 3 months.  3. Please initiate speech therapy at the [facility].     Brianda MONGE, RN  [facility] ENT   Head & Neck Surgery               Follow-ups after your visit        Additional Services     OTOLARYNGOLOGY REFERRAL       SPEECH-LANGUAGE PATHOLOGY SERVICE(S) REQUESTED:  Evaluate and treat    Kandice Felton, Ph.D., CCC/SLP  Speech-Language Pathologist  Director, [facility]  [phone]    Dianne Medel M.M., M.A., CCC/SLP  Speech-Language Pathologist  [facility]  [phone]                  Your next 10 appointments already scheduled     Aug 25, 2017 12:20 PM CDT   SHORT with Roro Chawla MD   [facility] [place] ([facility] [place] [address] [place] [address]   [phone]            Sep 28, 2017  1:45 PM CDT   Return Visit with Milena Joseph NP   [facility])    [address]  [address]  [facility] [address]   [phone]            Nov 09, 2017  1:00 PM CST   (Arrive by 12:30 PM)   NEW NECK with Agustin Palomares MD   [facility] ([facility])    [address] [place] [address]  "[facility] [address] [phone]              Who to contact     Please call your clinic at [phone] to:    Ask questions about your health    Make or cancel appointments    Discuss your medicines    Learn about your test results    Speak to your doctor   If you have compliments or concerns about an experience at your clinic, or if you wish to file a complaint, please contact [facility] Patient Relations at [phone] or email us at [email or web address]         Additional Information About Your Visit        [employer] Information     [employer] is an electronic gateway that provides easy, online access to your medical records. With [employer], you can request a clinic appointment, read your test results, renew a prescription or communicate with your care team.     To sign up for [employer] visit the website at [email or web address]   You will be asked to enter the access code listed below, as well as some personal information. Please follow the directions to create your username and password.     Your access code is: [email or web address]  Expires: 10/1/2017  7:07 PM     Your access code will  in 90 days. If you need help or a new code, please contact your [facility] or call [phone] for assistance.        Care EveryWhere ID     This is your Care EveryWhere ID. This could be used by other organizations to access your [facility] medical records  [id number]        Your Vitals Were     Height Last Period BMI (Body Mass Index)             1.626 m (5' 4\") 04/15/2016 34.16 kg/m2          Blood Pressure from Last 3 Encounters:   17 138/70   17 162/80   17 122/60    Weight from Last 3 Encounters:   17 90.3 kg (199 lb)   17 87.5 kg (193 lb)   17 85.5 kg (188 lb 6.4 oz)              We Performed the Following     IMAGESTREAM RECORDING ORDER     LARYNGOSCOPY FLEX/RIGID W STROBOSCOPY     OTOLARYNGOLOGY " REFERRAL        Primary Care Provider Office Phone # Fax #    Roro Chawla [phone]       Sydney JENNINGS [address] [facility] [address]        Equal Access to Services     CHARITY MANDUJANO : Hadarielle skinny ku hadjocelyneo Solilianeali, waaxda luqadaha, qaybta kaalmada adeegyada, caryl chown susanne chavez laAngiebebo saunders. So [facility] [phone].    ATENCIÓN: Si habla español, tiene a vargas disposición servicios gratuitos de asistencia lingüística. Llame al [phone].    We comply with applicable federal civil rights laws and [place] laws. We do not discriminate on the basis of race, color, national origin, age, disability sex, sexual orientation or gender identity.            Thank you!     Thank you for choosing [facility] E[facility] NOSE AND THROAT  for your care. Our goal is always to provide you with excellent care. Hearing back from our patients is one way we can continue to improve our services. Please take a few minutes to complete the written survey that you may receive in the mail after your visit with us. Thank you!             Your Updated Medication List - Protect others around you: Learn how to safely use, store and throw away your medicines at www.disposemymeds.org.          This list is accurate as of: 8/18/17 11:59 PM.  Always use your most recent med list.                   Brand Name Dispense Instructions for use Diagnosis    albuterol 108 (90 BASE) MCG/ACT Inhaler    VENTOLIN HFA    3 Inhaler    Inhale 2 puffs into the lungs every 6 hours    Shortness of breath       amLODIPine 10 MG tablet    NORVASC    90 tablet    Take 1 tablet (10 mg) by mouth daily    HTN, goal below 140/90       atorvastatin 80 MG tablet    LIPITOR    90 tablet    Take 1 tablet (80 mg) by mouth daily    Family history of ischemic heart disease, Hyperlipidemia LDL goal <130       budesonide-formoterol 160-4.5 MCG/ACT Inhaler    SYMBICORT    3 Inhaler    Inhale 2 puffs into the lungs 2 times daily    Uncomplicated asthma,  unspecified asthma severity       cetirizine HCl 10 MG Caps     90 capsule    Take 1 capsule by mouth daily    Chronic rhinitis       chlorhexidine 4 % liquid    HIBICLENS    946 mL    Wash ab, groin, thighs daily in shower DO NOT PUT ON FACE    Hidradenitis suppurativa       clindamycin-benzoyl peroxide gel    BENZACLIN    100 g    Apply topically 2 times daily    Hidradenitis suppurativa       desonide 0.05 % cream    DESOWEN    60 g    Apply sparingly to affected area three times daily as needed.    Localized pruritus       diazepam 10 MG tablet    VALIUM    90 tablet    Take 1 tablet (10 mg) by mouth every 8 hours as needed for anxiety or sleep For sleep, anxiety, and muscle tension.    Chest wall pain, Muscle pain, Anxiety       doxepin 10 MG capsule    SINEquan    30 capsule    Take 1 capsule (10 mg) by mouth At Bedtime    Persistent insomnia       doxycycline Monohydrate 100 MG Tabs     42 tablet    One pill by mouth twice daily    Hidradenitis suppurativa       escitalopram 20 MG tablet    LEXAPRO    90 tablet    Take 1 tablet (20 mg) by mouth daily Increased dose.    Anxiety       estradiol 0.1 MG/GM cream    ESTRACE VAGINAL    42.5 g    1 gm pv nightly at bedtime for 2 weeks, then twice a week at bedtime for maintenance.    Atrophic vaginitis       fluticasone 50 MCG/ACT spray    FLONASE    16 g    Spray 2 sprays into both nostrils daily    Severe chronic obstructive pulmonary disease (H)       furosemide 20 MG tablet    LASIX    90 tablet    Take 1 tablet (20 mg) by mouth daily as needed    Bilateral leg edema       hydrOXYzine 50 MG tablet    ATARAX    120 tablet    Take 1 tablet (50 mg) by mouth 4 times daily as needed for anxiety Increased dose. For anxiety    Anxiety       ipratropium - albuterol 0.5 mg/2.5 mg/3 mL 0.5-2.5 (3) MG/3ML neb solution    DUONEB    360 mL    Take 1 vial (3 mLs) by nebulization every 6 hours    Uncomplicated asthma, unspecified asthma severity       irbesartan 300 MG tablet     AVAPRO    90 tablet    Take 1 tablet (300 mg) by mouth daily    Essential hypertension with goal blood pressure less than 140/90       ketoconazole 2 % shampoo    NIZORAL    120 mL    Apply to the affected area and wash off after 5 minutes.    Localized pruritus       lisinopril 20 MG tablet    PRINIVIL/ZESTRIL    90 tablet    Take 1 tablet (20 mg) by mouth daily    Essential hypertension with goal blood pressure less than 140/90       montelukast 10 MG tablet    SINGULAIR    30 tablet    Take 1 tablet (10 mg) by mouth At Bedtime    Dust allergy, Severe chronic obstructive pulmonary disease (H)       NICORETTE 2 MG lozenge   Generic drug:  nicotine polacrilex      PLACE 1 LOZENGE INSIDE CHEEK Q HOUR PRF SMOKING CESSATION UTD        ondansetron 8 MG tablet    ZOFRAN    30 tablet    Take 0.5-1 tablets (4-8 mg) by mouth every 8 hours as needed for nausea    Nausea       order for DME     1 Device    Equipment being ordered: Nebulizer machine Length of need-lifetime    Chronic lung disease, Cough, Severe persistent asthma with acute exacerbation       oxyCODONE 10 MG IR tablet    ROXICODONE    150 tablet    5 times daily as needed    Chest wall pain, Severe chronic obstructive pulmonary disease (H), Continuous opioid dependence (H)       pantoprazole 20 MG EC tablet    PROTONIX    90 tablet    Take 2 tablets (40 mg) by mouth every morning (before breakfast)    Gastroesophageal reflux disease without esophagitis       predniSONE 10 MG tablet    DELTASONE    18 tablet    take 2 tabs daily for 5 days, then take 1 tab daily for 5 days, then 1/2 tab daily for 6 days    Severe chronic obstructive pulmonary disease (H)       prochlorperazine 5 MG tablet    COMPAZINE    90 tablet    Take 1 tablet (5 mg) by mouth every 6 hours as needed for nausea or vomiting    Nausea       ranitidine 75 MG tablet    ZANTAC    90 tablet    Take 2 tablets (150 mg) by mouth daily    Uncomplicated asthma, unspecified asthma severity        tiotropium 18 MCG capsule    SPIRIVA HANDIHALER    30 capsule    Inhale contents of one capsule daily.    Other emphysema (H)       varenicline 0.5 MG X 11 & 1 MG X 42 tablet    CHANTIX STARTING MONTH CHAPITO    53 tablet    Take 0.5 mg tab daily for 3 days, then 0.5 mg tab twice daily for 4 days, then 1 mg twice daily.    Encounter for smoking cessation counseling       venlafaxine 37.5 MG 24 hr capsule    EFFEXOR-XR    46 capsule    Take 1 capsule daily for 14 days, then take 2 capsules daily.    Menopausal syndrome (hot flashes)       VITAMIN D (CHOLECALCIFEROL) PO      Take 2,000 Units by mouth daily

## 2017-08-18 NOTE — NURSING NOTE
Chief Complaint   Patient presents with     Consult     Hoarseness     Magaly Cook Medical Assistant

## 2017-08-18 NOTE — PATIENT INSTRUCTIONS
1.  You were seen in the ENT Clinic today by Dr. Talbert.  If you have any questions or concerns after your appointment, please call [phone].  Press option #1 for scheduling related needs.  Press option #3 for Nurse advice.  2.  Plan is to return to clinic in 3 months.  3. Please initiate speech therapy at the [facility].     Brianda DOSHIN, RN  [facility] ENT   Head & Neck Surgery

## 2017-08-18 NOTE — PROGRESS NOTES
"[facility] VOICE CLINIC  Tanner Ochoa Jr., M.D., F.A.C.S.  Lis Talbert M.D., M.P.H.  Kandice Felton, Ph.D., CCC/SLP  Dianne Medel M.M. (voice), M.A., CCC/SLP  Jefferson Swain M.M. (voice), MTRAVIS., [facility]/SLP    Patient: Swetha Patterson  Date of Visit: 8/18/2017    CHIEF COMPLAINT: Dysphonia    HISTORY  Swetha Patterson was seen for initial voice evaluation and laryngeal examination in conjunction with a visit to Dr. Talbert.  Please refer to Dr. Talbert s dictation for additional history and impressions. Salient details of her history are as follows:    Ms. Patterson is a 47 year old female with a history of dysphonia.      She also has a more complex medical history including anxiety, chronic lung disease (asthma, emphysema, and chronic bronchitis), reflux, and tobacco use.  Please review Dr. Talbert's report for additional details    CURRENT SYMPTOMS INCLUDE  VOICE    Over two year Hx of voice issues, which were originally treated at the [facility] (we did not have records from [facility] at the time of evaluation).    She reports that her treatment at [facility] involved surgery for \"polyps\", as well as a course of speech therapy.    She has since transferred all of her care at our clinic.    Currently, coughing a    She reports that she quit smoking one week ago, in order to move forward with any surgical procedure that may seem necessary.  She has been using Chantix; experiences no side effects with the medication.  COUGH/THROAT CLEARING    Frequent coughing, which also seems to make her swallowing more effortful  SWALLOWING/THROAT SYMPTOMS    Occasional difficulty swallowing solid foods    Things rarely go down the wrong tube.  BREATHING    She is known to have \"polyps in her lungs, and has a difficult time breathing when her airway feels \"plugged\"    Denies having an inspiratory stridor, unless she has a URI  ADDITIONAL    Also saw Dr. Mayen in the past for her symptoms.    OTHER PERTINENT HISTORY    Please also refer to Dr." Nitish s dictation for additional history and impressions.  Past Medical History:   Diagnosis Date     Anxiety state, unspecified      Arthritis     right hip arthritis     Asthma      Chronic pain     back pain from cyst     Contact dermatitis and other eczema, due to unspecified cause      COPD (chronic obstructive pulmonary disease) (H)      Depressive disorder, not elsewhere classified      Emphysema with chronic bronchitis (H)      Esophageal reflux      Family history of ischemic heart disease      Gastro-oesophageal reflux disease      History of emphysema      Hoarseness      HTN, goal below 140/90      Hyperlipidemia LDL goal <130      Pneumonia      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)      PONV (postoperative nausea and vomiting)      Past Surgical History:   Procedure Laterality Date     ARTHROSCOPY SHOULDER DECOMPRESSION Left 10/21/2015    Procedure: ARTHROSCOPY SHOULDER DECOMPRESSION;  Surgeon: Julien Milian MD;  Location: [facility] OR     BRONCHIAL THERMOPLASTY N/A 11/14/2014    Procedure: BRONCHIAL THERMOPLASTY;  Surgeon: Ward Whitaker MD;  Location: [facility] GI     BRONCHIAL THERMOPLASTY N/A 12/19/2014    Procedure: BRONCHIAL THERMOPLASTY;  Surgeon: Ward Whitaker MD;  Location: [facility] OR     BRONCHIAL THERMOPLASTY N/A 2/6/2015    Procedure: BRONCHIAL THERMOPLASTY;  Surgeon: Ward Whitaker MD;  Location: [facility] OR     C APPENDECTOMY  at age 17     EXCISE NODE MEDIASTINAL  4/26/2013    Procedure: EXCISE NODE MEDIASTINAL;;  Surgeon: Av Peña MD;  Location:  OR     THORACOSCOPY  4/26/2013    Procedure: THORACOSCOPY;  LEFT VIDEO ASSISTED THORACOSCOPY, RESECTION OF POSTERIOR MEDIASTINAL MASS;  Surgeon: Av Peña MD;  Location:  OR     TONSILLECTOMY  as a kid     TONSILLECTOMY         OBJECTIVE  PATIENT REPORTED MEASURES  Patient Supplied Answers To VHI Questionnaire  Voice Handicap Index (VHI-10) 8/18/2017   How often do you have any of the following  "symptoms:  Indigestion, heartburn, chest pain, stomach acid coming up, and/or tasting acid in your mouth or throat? Weekly   (F1) My voice makes it difficult for people to hear me. Always   (F2) People have difficulty understanding me in a noisy room. Almost always   (F8) My voice difficulties restrict my personal and social life. Always   (F9) I feel left out of conversations because of my voice. Almost always   (F10) My voice problem causes me to lose income. Never   (P5) I feel as though I have to strain to produce voice. Always   (P6) The clarity of my voice is unpredictable. Always   (E4) My voice problem upsets me. Always   (E6) My voice makes me feel handicapped. Sometimes   (P3) People ask, \"What's wrong with your voice?\" Always   VHI Total Score 32       Patient Supplied Answers To CSI Questionnaire  Cough Severity Index (CSI) 8/18/2017   My cough is worse when I lie down. Always   My coughing problem causes me to restrict my personal and social life. Always   I tend to avoid places because of my cough problem. Always   I feel embarrassed because of my coughing problem. Always   People ask, \"What's wrong?\" because I cough a lot. Always   I run out of air when I cough. Always   My coughing problem affects my voice. Always   My coughing problem limits my physical activity. Always   My coughing problem upsets me. Always   People ask me if I am sick because I cough a lot. Almost always   CSI Total Score 39       Patient Supplied Answers To EAT Questionnaire  Eating Assessment Tool (EAT-10) 8/18/2017   My swallowing problem has caused me to lose weight. 1   My swallowing problem interferes with my ability to go out for meals. 0   Swallowing solids takes extra effort. 2   Swallowing pills takes extra effort. 1   Swallowing is painful. 4   The pleasure of eating is affected by my swallowing. 1   When I swallow food sticks in my throat. 1   I cough when I eat. 3   Swallowing is stressful. 4   How often do things go " down the wrong way when you swallow? Rarely   Have you had pneumonia, bronchitis, or another severe lung infection in the last 6 months? Yes   EAT Total Score 17     PERCEPTUAL EVALUATION (CPT 04779)  POSTURE / TENSION:     upper body    neck and shoulders    BREATHING:     inspirations are inadequate in volume and frequency    clavicular elevation on inspiration    clavicular muscle use pattern     shoulder and neck involvement    noisy on inspiration    shallow    abdominal contraction at initiation of phonation    phonation is not coordinated with respiration    talks to past end of breath stream    LARYNGEAL PALPATION:     firm musculature    tenderness of the thyrohyoid area    reduced thyrohyoid space    VOICE:    Roughness: Mild to moderate    Breathiness: Mild to moderate    Strain: Mild to moderate    Loudness    Conversational speech:  Mildly reduced    Projected speech:  reduced    Pitch:    Conversational speech:  Mild to moderately lowered    Pitch glide: neurologically normal; limited upper range    Resonance:    Conversational speech:  laryngeal pharyngeal resonance    Singing vs. Speech:  same    CAPE-V Overall Severity:  37/100;  Global Dysphonia Severity:  42/100    COUGH/THROAT CLEARING:    Frequent    Dry    Wet    Locus of cough/ throat clear: sounds consistent with upper airway and lower airway     Moderate to severe    LARYNGEAL EXAMINATION  Procedure: Flexible endoscopy with chip-tip technology with stroboscopy, right nostril; topical anesthesia with 3% Lidocaine and 0.25% phenylephrine was applied.   Performed by: Dr. Talbert   The laryngeal and pharyngeal structures were evaluated for gross appearance, mobility, function, and focal lesions / abnormalities of the associated mucosa.  Stroboscopy was warranted to evaluate closure, symmetry, and vibratory characteristics of the vocal folds.  All findings were within normal limits with the exception of the following salient features:      Bilateral vocal fold lesions at the anterior 1/3rd junction along the vibratory margin; the left is prominent and shallowly enters into the glottis; right vocal fold demonstrates a reactive concave shape along the vibratory margin, with some mild irregular mucosa.    Moderate supraglottic hyperfunction.    Stroboscopy (somewhat difficult to capture secondary to hypersensitivity):    Amplitude: reduced bilaterally    Symmetry: asymmetrical    Phase: regular phase    Hourglass glottic configuration.      Image 1: partially abducted view of irregularities along the vibratory margins of the vocal folds.  Second image: partially adducted view of irregularities along the vibratory margins.    The laryngeal exam was reviewed with Ms. Patterson, and I provided pertinent explanations, as well as written and oral information.    ASSESSMENT / PLAN  IMPRESSIONS: R49.0 (Dysphonia), R05 (Chronic Cough), J38.7 (Irritable Larynx Syndrome) and (pre-nodular edema of the vocal folds)     Laryngeal evaluation demonstrated:    Bilateral vocal fold lesions at the anterior 1/3rd junction along the vibratory margin; the left is prominent and shallowly enters into the glottis; right vocal fold demonstrates a reactive concave shape along the vibratory margin, with some mild irregular mucosa.    Moderate supraglottic hyperfunction.    Stroboscopy (somewhat difficult to capture secondary to hypersensitivity):    Amplitude: reduced bilaterally    Symmetry: asymmetrical    Phase: regular phase    Hourglass glottic configuration.    Dysphonia is accounted for by the vocal fold lesions, and resulting aberrations in vibratory characteristics and mucosal wave    Dysphonia/discomfort is compounded by the hyperfunction and imbalanced function of the intrinsic and extrinsic laryngeal musculature      Cough/throat clear are accounted for by the hypersensitivity of the larynx and pharynx as evidenced by case history, patient complaints and absence of  other organic findings; hypersensitivity is compounded by imbalance in the function of the intrinsic and extrinsic laryngeal musculature during connected speech     Ms. Patterson also has a good awareness of how stress and anxiety can contribute to her symptoms.  STIMULABILITY: results of therapy probes during perceptual and laryngeal evaluation demonstrate improvement with use of forward resonant stimuli, coordination of respiration and phonation and use of yawn sigh  RECOMMENDATIONS:     A course of speech therapy is recommended to optimize vocal technique, improve voice quality, promote reduced discomfort, effort and fatigue, promote reduction of vocal fold impact to help resolve the lesions and help reduce chronic cough, throat clear and mucosal irritation.  Dr. Talbert stressed to her the importance of reducing her laryngeal hypersensitivity, and suppressing her chronic cough and throat clearing prior to any further intervention.    Dr. Talbert would like her to follow up in 3-4 months, and to continue working with her lung specialists.    She demonstrates a Good prognosis for improvement given adherence to therapeutic recommendations. Therapeutic     Positive indicators: motivation    Negative indicators: none    DURATION / FREQUENCY: 2 one-hour weekly sessions, followed by 2 bi-weekly sessions.  A total of 6-8 sessions may be necessary to resolve symptoms to within normal limits.    GOALS:  Patient goal:   To improve and maintain a healthy voice quality  To resolve the vocal fold lesions  To understand the problem and fix it as much as possible  To reduce her cough to acceptable levels  To breathe normally and comfortably in all situations    Long-term goal(s): In 6 months, Ms. Patterson will:  Report a week of typical vocal activities, in which dysphonia and discomfort do not exceed a level of 3 out of 10, 80% of the time   Report a week with no more than 3 episodes of coughing, that do not last more than 5  seconds  Report a speaking and singing voice quality that is acceptable to her, 90%of the time      G-Codes:   - Functional limitation, current status, at evalution CK - At least 40 percent but less than 60 percent impaired, limited, or restricted   - Functional limitation, projected goal status, at reporting interval CJ - At least 20 percent but less than 40 percent impaired, limited, or restricted  Certification period: August 18, 2017 - 11/16/17    This treatment plan was developed with the patient who agreed with the recommendations.    TOTAL SERVICE:  EVALUATION OF VOICE AND RESONANCE: (54257): 60 minutes    NO CHARGE FACILITY FEE (85761)    Dianne Medel M.M. (voice), M.A., CCC/SLP  Speech-Language Pathologist  [facility]  [phone]

## 2017-08-24 NOTE — PROGRESS NOTES
Outpatient Speech Language Therapy Evaluation  PLAN OF TREATMENT FOR OUTPATIENT REHABILITATION  (COMPLETE FOR INITIAL CLAIMS ONLY)  Patient's Last Name, First Name, M.I.  YOB: 1970  Swetha Patterson                        Provider's Name  Dianne Medel Medical Record No.  [id number]                               Onset Date:  8/18/17   Start of Care Date: 8/18/17     Type: Speech Language Therapy Medical Diagnosis: Dysphonia [R49.0]                        Therapy Diagnosis:  Dysphonia [R49.0]   Visits from SOC:  1   _________________________________________________________________________________  Plan of Treatment:   Speech therapy    DURATION/FREQUENCY OF TREATMENT  Six weekly, one-hour sessions, with two monthly one-hour follow-up sessions    PROGNOSIS  Good prognosis for voice improvement with speech therapy and regular practice of therapeutic activities.    BARRIERS TO LEARNING/TEACHING AND LEARNING NEEDS  None/Unremarkable    GOALS:  Patient goal:   1. To improve and maintain a healthy voice quality  2. To resolve the vocal fold lesions  3. To understand the problem and fix it as much as possible  4. To reduce her cough to acceptable levels  5. To breathe normally and comfortably in all situations     Long-term goal(s): In 6 months, Ms. Patterson will:  1. Report a week of typical vocal activities, in which dysphonia and discomfort do not exceed a level of 3 out of 10, 80% of the time   2. Report a week with no more than 3 episodes of coughing, that do not last more than 5 seconds  Report a speaking and singing voice quality that is acceptable to her, 90%of the time_______________________________________________    I CERTIFY THE NEED FOR THESE SERVICES FURNISHED UNDER        THIS PLAN OF TREATMENT AND WHILE UNDER MY CARE     (Physician attestation of this document indicates review and certification of the therapy  plan).     Certification date from: 8/18/17  Certification date to: 11/16/17    Referring Provider: Lis Talbert

## 2017-08-25 ENCOUNTER — DOCUMENTATION ONLY (OUTPATIENT)
Dept: OTOLARYNGOLOGY | Facility: CLINIC | Age: 47
End: 2017-08-25

## 2017-08-25 ENCOUNTER — OFFICE VISIT (OUTPATIENT)
Dept: INTERNAL MEDICINE | Facility: CLINIC | Age: 47
End: 2017-08-25
Payer: MEDICARE

## 2017-08-25 VITALS
WEIGHT: 195 LBS | BODY MASS INDEX: 33.29 KG/M2 | DIASTOLIC BLOOD PRESSURE: 64 MMHG | TEMPERATURE: 98.3 F | SYSTOLIC BLOOD PRESSURE: 128 MMHG | OXYGEN SATURATION: 98 % | HEIGHT: 64 IN | HEART RATE: 100 BPM

## 2017-08-25 DIAGNOSIS — J44.9 SEVERE CHRONIC OBSTRUCTIVE PULMONARY DISEASE (H): Chronic | ICD-10-CM

## 2017-08-25 DIAGNOSIS — F11.20 CONTINUOUS OPIOID DEPENDENCE (H): Chronic | ICD-10-CM

## 2017-08-25 DIAGNOSIS — M79.10 MUSCLE PAIN: ICD-10-CM

## 2017-08-25 DIAGNOSIS — R07.89 CHEST WALL PAIN: ICD-10-CM

## 2017-08-25 DIAGNOSIS — F41.9 ANXIETY: ICD-10-CM

## 2017-08-25 PROCEDURE — 99214 OFFICE O/P EST MOD 30 MIN: CPT | Performed by: INTERNAL MEDICINE

## 2017-08-25 RX ORDER — DIAZEPAM 10 MG
10 TABLET ORAL EVERY 8 HOURS PRN
Qty: 90 TABLET | Refills: 0 | Status: SHIPPED | OUTPATIENT
Start: 2017-08-25 | End: 2017-09-25

## 2017-08-25 RX ORDER — PREDNISONE 10 MG/1
TABLET ORAL
Qty: 60 TABLET | Refills: 0 | Status: SHIPPED | OUTPATIENT
Start: 2017-08-25 | End: 2017-09-25

## 2017-08-25 RX ORDER — OXYCODONE HYDROCHLORIDE 10 MG/1
TABLET ORAL
Qty: 150 TABLET | Refills: 0 | Status: SHIPPED | OUTPATIENT
Start: 2017-08-25 | End: 2017-09-25

## 2017-08-25 RX ORDER — HYDROXYZINE HYDROCHLORIDE 50 MG/1
50 TABLET, FILM COATED ORAL 4 TIMES DAILY PRN
Qty: 120 TABLET | Refills: 0 | Status: SHIPPED | OUTPATIENT
Start: 2017-08-25 | End: 2017-10-12

## 2017-08-25 RX ORDER — LEVOFLOXACIN 500 MG/1
500 TABLET, FILM COATED ORAL DAILY
Qty: 7 TABLET | Refills: 0 | Status: SHIPPED | OUTPATIENT
Start: 2017-08-25 | End: 2017-09-01

## 2017-08-25 NOTE — MR AVS SNAPSHOT
After Visit Summary   8/25/2017    Swetha Patterson    MRN: [id number]           Patient Information     Date Of Birth          1970        Visit Information        Provider Department      8/25/2017 12:20 PM Roro Chawla MD [facility]        Today's Diagnoses     Chr Lung Disease - managed by the pulm dept ([facility])^^^        Chest wall pain        Continuous opioid dependence ([facility]) opioid for chr cough and chest pain ^^^^        Muscle pain        Anxiety          Care Instructions    Plan:  1. Prednisone 10 mg   -- take 4 tabs daily for 3 days, then 2 tabs daily for 5 days, then take 1 tab daily for 5 days, then 1/2 tab daily for 6 days  2. Chest XRay today   3. Levaquin 500 mg daily   4. Continue the other meds, same doses for now.          Follow-ups after your visit        Your next 10 appointments already scheduled     Sep 28, 2017  1:45 PM CDT   Return Visit with Milena Joseph NP   [facility]    [address]  [facility] [address] [phone]            Nov 09, 2017  1:00 PM CST   (Arrive by 12:30 PM)   NEW NECK with Agustin Palomares MD   [facility] ([facility])    [address] [place] [address] [phone]              Future tests that were ordered for you today     Open Future Orders        Priority Expected Expires Ordered    XR Chest 2 Views Routine 8/25/2017 8/25/2018 8/25/2017            Who to contact     If you have questions or need follow up information about today's clinic visit or your schedule please contact [facility] directly at [phone].  Normal or non-critical lab and imaging results will be communicated to you by MyChart, letter or phone within 4 business days after the clinic has received the results. If you do not hear from us within 7 days, please contact  "the clinic through [facility] [employer] or phone. If you have a critical or abnormal lab result, we will notify you by phone as soon as possible.  Submit refill requests through [employer] or call your pharmacy and they will forward the refill request to us. Please allow 3 business days for your refill to be completed.          Additional Information About Your Visit        [employer] Information     [employer] lets you send messages to your doctor, view your test results, renew your prescriptions, schedule appointments and more. To sign up, go to [email or web address] . Click on \"Log in\" on the left side of the screen, which will take you to the Welcome page. Then click on \"Sign up Now\" on the right side of the page.     You will be asked to enter the access code listed below, as well as some personal information. Please follow the directions to create your username and password.     Your access code is: [email or web address]  Expires: 10/1/2017  7:07 PM     Your access code will  in 90 days. If you need help or a new code, please call your [place] clinic or [phone].        Care EveryWhere ID     This is your Care EveryWhere ID. This could be used by other organizations to access your [place] medical records  [id number]        Your Vitals Were     Pulse Temperature Height Last Period Pulse Oximetry BMI (Body Mass Index)    100 98.3  F (36.8  C) (Oral) 5' 4\" (1.626 m) 04/15/2016 98% 33.47 kg/m2       Blood Pressure from Last 3 Encounters:   17 128/64   17 138/70   17 162/80    Weight from Last 3 Encounters:   17 195 lb (88.5 kg)   17 199 lb (90.3 kg)   17 193 lb (87.5 kg)                 Today's Medication Changes          These changes are accurate as of: 17 12:24 PM.  If you have any questions, ask your nurse or doctor.               Start taking these medicines.        Dose/Directions    levofloxacin 500 MG tablet   Commonly known as:  LEVAQUIN   Used for:  Severe chronic obstructive " pulmonary disease (H)   Started by:  Roro Chawla MD        Dose:  500 mg   Take 1 tablet (500 mg) by mouth daily for 7 days   Quantity:  7 tablet   Refills:  0         These medicines have changed or have updated prescriptions.        Dose/Directions    predniSONE 10 MG tablet   Commonly known as:  DELTASONE   This may have changed:  additional instructions   Used for:  Severe chronic obstructive pulmonary disease (H)   Changed by:  Roro Chawla MD        Take the prednisone as directed by the doctor   Quantity:  60 tablet   Refills:  0            Where to get your medicines      These medications were sent to [facility] [place] [address] [address] [facility].  [facility] [address] [facility] [address]     Phone:  [phone]     hydrOXYzine 50 MG tablet    levofloxacin 500 MG tablet    predniSONE 10 MG tablet         Some of these will need a paper prescription and others can be bought over the counter.  Ask your nurse if you have questions.     Bring a paper prescription for each of these medications     diazepam 10 MG tablet    oxyCODONE 10 MG IR tablet                Primary Care Provider Office Phone # Fax #    Roro Chawla [phone]       [address]  [facility] [address]        Equal Access to Services     CHARITY MANDUJANO : Hadii skinny ledezma hadasho Soomaali, waaxda luqadaha, qaybta kaalmada adeegyada, waxay clrak haybebo saunders. So [facility] [phone].    ATENCIÓN: Si habla español, tiene a vargas disposición servicios gratuitos de asistencia lingüística. Llame al [phone].    We comply with applicable federal civil rights laws and [place] laws. We do not discriminate on the basis of race, color, national origin, age, disability sex, sexual orientation or gender identity.            Thank you!     Thank you for choosing [facility]  for your care. Our goal is always to provide you  with excellent care. Hearing back from our patients is one way we can continue to improve our services. Please take a few minutes to complete the written survey that you may receive in the mail after your visit with us. Thank you!             Your Updated Medication List - Protect others around you: Learn how to safely use, store and throw away your medicines at www.disposemymeds.org.          This list is accurate as of: 8/25/17 12:24 PM.  Always use your most recent med list.                   Brand Name Dispense Instructions for use Diagnosis    albuterol 108 (90 BASE) MCG/ACT Inhaler    VENTOLIN HFA    3 Inhaler    Inhale 2 puffs into the lungs every 6 hours    Shortness of breath       amLODIPine 10 MG tablet    NORVASC    90 tablet    Take 1 tablet (10 mg) by mouth daily    HTN, goal below 140/90       atorvastatin 80 MG tablet    LIPITOR    90 tablet    Take 1 tablet (80 mg) by mouth daily    Family history of ischemic heart disease, Hyperlipidemia LDL goal <130       budesonide-formoterol 160-4.5 MCG/ACT Inhaler    SYMBICORT    3 Inhaler    Inhale 2 puffs into the lungs 2 times daily    Uncomplicated asthma, unspecified asthma severity       cetirizine HCl 10 MG Caps     90 capsule    Take 1 capsule by mouth daily    Chronic rhinitis       chlorhexidine 4 % liquid    HIBICLENS    946 mL    Wash ab, groin, thighs daily in shower DO NOT PUT ON FACE    Hidradenitis suppurativa       clindamycin-benzoyl peroxide gel    BENZACLIN    100 g    Apply topically 2 times daily    Hidradenitis suppurativa       desonide 0.05 % cream    DESOWEN    60 g    Apply sparingly to affected area three times daily as needed.    Localized pruritus       diazepam 10 MG tablet    VALIUM    90 tablet    Take 1 tablet (10 mg) by mouth every 8 hours as needed for anxiety or sleep For sleep, anxiety, and muscle tension.    Chest wall pain, Muscle pain, Anxiety       doxepin 10 MG capsule    SINEquan    30 capsule    Take 1 capsule (10  mg) by mouth At Bedtime    Persistent insomnia       doxycycline Monohydrate 100 MG Tabs     42 tablet    One pill by mouth twice daily    Hidradenitis suppurativa       escitalopram 20 MG tablet    LEXAPRO    90 tablet    Take 1 tablet (20 mg) by mouth daily Increased dose.    Anxiety       estradiol 0.1 MG/GM cream    ESTRACE VAGINAL    42.5 g    1 gm pv nightly at bedtime for 2 weeks, then twice a week at bedtime for maintenance.    Atrophic vaginitis       fluticasone 50 MCG/ACT spray    FLONASE    16 g    Spray 2 sprays into both nostrils daily    Severe chronic obstructive pulmonary disease (H)       furosemide 20 MG tablet    LASIX    90 tablet    Take 1 tablet (20 mg) by mouth daily as needed    Bilateral leg edema       hydrOXYzine 50 MG tablet    ATARAX    120 tablet    Take 1 tablet (50 mg) by mouth 4 times daily as needed for anxiety Increased dose. For anxiety    Anxiety       ipratropium - albuterol 0.5 mg/2.5 mg/3 mL 0.5-2.5 (3) MG/3ML neb solution    DUONEB    360 mL    Take 1 vial (3 mLs) by nebulization every 6 hours    Uncomplicated asthma, unspecified asthma severity       irbesartan 300 MG tablet    AVAPRO    90 tablet    Take 1 tablet (300 mg) by mouth daily    Essential hypertension with goal blood pressure less than 140/90       ketoconazole 2 % shampoo    NIZORAL    120 mL    Apply to the affected area and wash off after 5 minutes.    Localized pruritus       levofloxacin 500 MG tablet    LEVAQUIN    7 tablet    Take 1 tablet (500 mg) by mouth daily for 7 days    Severe chronic obstructive pulmonary disease (H)       lisinopril 20 MG tablet    PRINIVIL/ZESTRIL    90 tablet    Take 1 tablet (20 mg) by mouth daily    Essential hypertension with goal blood pressure less than 140/90       montelukast 10 MG tablet    SINGULAIR    30 tablet    Take 1 tablet (10 mg) by mouth At Bedtime    Dust allergy, Severe chronic obstructive pulmonary disease (H)       NICORETTE 2 MG lozenge   Generic drug:   nicotine polacrilex      PLACE 1 LOZENGE INSIDE CHEEK Q HOUR PRF SMOKING CESSATION UTD        ondansetron 8 MG tablet    ZOFRAN    30 tablet    Take 0.5-1 tablets (4-8 mg) by mouth every 8 hours as needed for nausea    Nausea       order for DME     1 Device    Equipment being ordered: Nebulizer machine Length of need-lifetime    Chronic lung disease, Cough, Severe persistent asthma with acute exacerbation       oxyCODONE 10 MG IR tablet    ROXICODONE    150 tablet    5 times daily as needed    Chest wall pain, Severe chronic obstructive pulmonary disease (H), Continuous opioid dependence (H)       pantoprazole 20 MG EC tablet    PROTONIX    90 tablet    Take 2 tablets (40 mg) by mouth every morning (before breakfast)    Gastroesophageal reflux disease without esophagitis       predniSONE 10 MG tablet    DELTASONE    60 tablet    Take the prednisone as directed by the doctor    Severe chronic obstructive pulmonary disease (H)       prochlorperazine 5 MG tablet    COMPAZINE    90 tablet    Take 1 tablet (5 mg) by mouth every 6 hours as needed for nausea or vomiting    Nausea       ranitidine 75 MG tablet    ZANTAC    90 tablet    Take 2 tablets (150 mg) by mouth daily    Uncomplicated asthma, unspecified asthma severity       tiotropium 18 MCG capsule    SPIRIVA HANDIHALER    30 capsule    Inhale contents of one capsule daily.    Other emphysema (H)       varenicline 0.5 MG X 11 & 1 MG X 42 tablet    CHANTIX STARTING MONTH CHAPITO    53 tablet    Take 0.5 mg tab daily for 3 days, then 0.5 mg tab twice daily for 4 days, then 1 mg twice daily.    Encounter for smoking cessation counseling       venlafaxine 37.5 MG 24 hr capsule    EFFEXOR-XR    46 capsule    Take 1 capsule daily for 14 days, then take 2 capsules daily.    Menopausal syndrome (hot flashes)       VITAMIN D (CHOLECALCIFEROL) PO      Take 2,000 Units by mouth daily

## 2017-08-25 NOTE — PROGRESS NOTES
Received records from [place], Dr. 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    Findings included left true vocal fold cyst, right reactive change, left saccular cyst, early polypoid corditis bilaterally.    Clinic note 8/10/15 notes: all was benign  Also comments on severe reflux despite anti-reflux medication, plan for GI consult    Full notes will be submitted for scanning

## 2017-08-25 NOTE — PATIENT INSTRUCTIONS
Plan:  1. Prednisone 10 mg   -- take 4 tabs daily for 3 days, then 2 tabs daily for 5 days, then take 1 tab daily for 5 days, then 1/2 tab daily for 6 days  2. Chest XRay today   3. Levaquin 500 mg daily   4. Continue the other meds, same doses for now.

## 2017-08-25 NOTE — PROGRESS NOTES
Patient's instructions / PLAN:                                                        Plan:  1. Prednisone 10 mg   -- take 4 tabs daily for 3 days, then 2 tabs daily for 5 days, then take 1 tab daily for 5 days, then 1/2 tab daily for 6 days  2. Chest XRay today   3. Levaquin 500 mg daily   4. Continue the other meds, same doses for now.  5.        ASSESSMENT & PLAN:                                                      (F11.20) Continuous opioid dependence ([facility]) opioid for chr cough and chest pain ^^^^  Comment:   Plan: oxyCODONE (ROXICODONE) 10 MG IR tablet            (J44.9) Chr Lung Disease - managed by the pulm dept ([facility])^^^  Comment: acute exacerbation   Plan: predniSONE (DELTASONE) 10 MG tablet,         levofloxacin (LEVAQUIN) 500 MG tablet, XR Chest        2 Views, oxyCODONE (ROXICODONE) 10 MG IR tablet            (R07.89) Chest wall pain  Comment:   Plan: oxyCODONE (ROXICODONE) 10 MG IR tablet,         diazepam (VALIUM) 10 MG tablet            (M79.1) Muscle pain  Comment:   Plan: diazepam (VALIUM) 10 MG tablet            (F41.9) Anxiety  Comment:   Plan: diazepam (VALIUM) 10 MG tablet, hydrOXYzine         (ATARAX) 50 MG tablet               Chief Complaint:                                                        Cough     SUBJECTIVE:                                                    History of present illness     Chr cough  -- more cough x 1 w  -- not feeling well: vomiting after the cough  -- more sob   -- she noticed brown mucus w blood inside after she vomited and coughed  -- she uses nebs 3 times a day  -- started Prednisone 20 mg 4 days a go     Anxiety is better  -- she missed appointment with psychiatry because she was ill  -- she will reschedule     ROS:                                                      ROS: negative for fever, chills, vomiting, abdominal pain, leg swelling pos for cough and sob,  wheezes and CP with cough    A 10-point review of systems was obtained.  Those pertinent are above  "and in the in the Subjective section.  The rest of the systems are negative.        OBJECTIVE:                                                    Physical Exam :    Blood pressure 128/64, pulse 100, temperature 98.3  F (36.8  C), temperature source Oral, height 5' 4\" (1.626 m), weight 195 lb (88.5 kg), last menstrual period 04/15/2016, SpO2 98 %, not currently breastfeeding.   NAD, appears comfortable  Skin: no rashes   Chest: wheezes bilaterally, poor respiratory effort  Heart: S1 S2, RRR, no mgr appreciated  Abdomen: soft, not tender,   Extremities: no edema,   Neurologic: A, Ox3, no focal signs appreciated    PMHx: reviewed  Past Medical History:   Diagnosis Date     Anxiety state, unspecified      Arthritis     right hip arthritis     Asthma      Chronic pain     back pain from cyst     Contact dermatitis and other eczema, due to unspecified cause      COPD (chronic obstructive pulmonary disease) (H)      Depressive disorder, not elsewhere classified      Emphysema with chronic bronchitis (H)      Esophageal reflux      Family history of ischemic heart disease      Gastro-oesophageal reflux disease      History of emphysema      Hoarseness      HTN, goal below 140/90      Hyperlipidemia LDL goal <130      Pneumonia      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)      PONV (postoperative nausea and vomiting)       PSHx: reviewed  Past Surgical History:   Procedure Laterality Date     ARTHROSCOPY SHOULDER DECOMPRESSION Left 10/21/2015    Procedure: ARTHROSCOPY SHOULDER DECOMPRESSION;  Surgeon: Julien Milian MD;  Location: [facility] OR     BRONCHIAL THERMOPLASTY N/A 11/14/2014    Procedure: BRONCHIAL THERMOPLASTY;  Surgeon: Ward Whitaker MD;  Location: [facility] GI     BRONCHIAL THERMOPLASTY N/A 12/19/2014    Procedure: BRONCHIAL THERMOPLASTY;  Surgeon: Ward Whitaker MD;  Location: [facility] OR     BRONCHIAL THERMOPLASTY N/A 2/6/2015    Procedure: BRONCHIAL THERMOPLASTY;  Surgeon: Ward Whitaker MD;  " Location: [facility] OR     C APPENDECTOMY  at age 17     EXCISE NODE MEDIASTINAL  4/26/2013    Procedure: EXCISE NODE MEDIASTINAL;;  Surgeon: Av Peña MD;  Location:  OR     THORACOSCOPY  4/26/2013    Procedure: THORACOSCOPY;  LEFT VIDEO ASSISTED THORACOSCOPY, RESECTION OF POSTERIOR MEDIASTINAL MASS;  Surgeon: Av Peña MD;  Location:  OR     TONSILLECTOMY  as a kid     TONSILLECTOMY          Meds: reviewed  Current Outpatient Prescriptions   Medication Sig Dispense Refill     varenicline (CHANTIX STARTING MONTH CHAPITO) 0.5 MG X 11 & 1 MG X 42 tablet Take 0.5 mg tab daily for 3 days, then 0.5 mg tab twice daily for 4 days, then 1 mg twice daily. 53 tablet 0     oxyCODONE (ROXICODONE) 10 MG IR tablet 5 times daily as needed 150 tablet 0     diazepam (VALIUM) 10 MG tablet Take 1 tablet (10 mg) by mouth every 8 hours as needed for anxiety or sleep For sleep, anxiety, and muscle tension. 90 tablet 0     hydrOXYzine (ATARAX) 50 MG tablet Take 1 tablet (50 mg) by mouth 4 times daily as needed for anxiety Increased dose. For anxiety 120 tablet 1     escitalopram (LEXAPRO) 20 MG tablet Take 1 tablet (20 mg) by mouth daily Increased dose. 90 tablet 1     doxepin (SINEQUAN) 10 MG capsule Take 1 capsule (10 mg) by mouth At Bedtime 30 capsule 1     venlafaxine (EFFEXOR-XR) 37.5 MG 24 hr capsule Take 1 capsule daily for 14 days, then take 2 capsules daily. 46 capsule 0     estradiol (ESTRACE VAGINAL) 0.1 MG/GM cream 1 gm pv nightly at bedtime for 2 weeks, then twice a week at bedtime for maintenance. 42.5 g 6     doxycycline Monohydrate 100 MG TABS One pill by mouth twice daily 42 tablet 0     ondansetron (ZOFRAN) 8 MG tablet Take 0.5-1 tablets (4-8 mg) by mouth every 8 hours as needed for nausea 30 tablet 1     fluticasone (FLONASE) 50 MCG/ACT spray Spray 2 sprays into both nostrils daily 16 g 11     ranitidine (ZANTAC) 75 MG tablet Take 2 tablets (150 mg) by mouth daily 90 tablet 1     cetirizine HCl  10 MG CAPS Take 1 capsule by mouth daily 90 capsule 3     atorvastatin (LIPITOR) 80 MG tablet Take 1 tablet (80 mg) by mouth daily 90 tablet 1     amLODIPine (NORVASC) 10 MG tablet Take 1 tablet (10 mg) by mouth daily 90 tablet 1     lisinopril (PRINIVIL/ZESTRIL) 20 MG tablet Take 1 tablet (20 mg) by mouth daily 90 tablet 1     irbesartan (AVAPRO) 300 MG tablet Take 1 tablet (300 mg) by mouth daily 90 tablet 1     montelukast (SINGULAIR) 10 MG tablet Take 1 tablet (10 mg) by mouth At Bedtime 30 tablet 1     furosemide (LASIX) 20 MG tablet Take 1 tablet (20 mg) by mouth daily as needed 90 tablet 1     NICORETTE 2 MG lozenge PLACE 1 LOZENGE INSIDE CHEEK Q HOUR PRF SMOKING CESSATION UTD  1     budesonide-formoterol (SYMBICORT) 160-4.5 MCG/ACT Inhaler Inhale 2 puffs into the lungs 2 times daily 3 Inhaler 3     ipratropium - albuterol 0.5 mg/2.5 mg/3 mL (DUONEB) 0.5-2.5 (3) MG/3ML neb solution Take 1 vial (3 mLs) by nebulization every 6 hours 360 mL 0     tiotropium (SPIRIVA HANDIHALER) 18 MCG capsule Inhale contents of one capsule daily. 30 capsule 1     albuterol (VENTOLIN HFA) 108 (90 BASE) MCG/ACT Inhaler Inhale 2 puffs into the lungs every 6 hours 3 Inhaler 3     ketoconazole (NIZORAL) 2 % shampoo Apply to the affected area and wash off after 5 minutes. 120 mL 1     desonide (DESOWEN) 0.05 % cream Apply sparingly to affected area three times daily as needed. 60 g 0     pantoprazole (PROTONIX) 20 MG tablet Take 2 tablets (40 mg) by mouth every morning (before breakfast) 90 tablet 3     prochlorperazine (COMPAZINE) 5 MG tablet Take 1 tablet (5 mg) by mouth every 6 hours as needed for nausea or vomiting 90 tablet 0     order for DME Equipment being ordered: Nebulizer machine  Length of need-lifetime 1 Device 0     clindamycin-benzoyl peroxide (BENZACLIN) gel Apply topically 2 times daily 100 g 3     chlorhexidine (HIBICLENS) 4 % external liquid Wash ab, groin, thighs daily in shower DO NOT PUT ON FACE 946 mL 3      VITAMIN D, CHOLECALCIFEROL, PO Take 2,000 Units by mouth daily         Soc Hx: reviewed  Fam Hx: reviewed          Roro Low MD  Internal Medicine

## 2017-08-30 ENCOUNTER — TELEPHONE (OUTPATIENT)
Dept: PULMONOLOGY | Facility: CLINIC | Age: 47
End: 2017-08-30

## 2017-08-30 DIAGNOSIS — R91.8 PULMONARY NODULES: Primary | ICD-10-CM

## 2017-08-30 DIAGNOSIS — J44.9 SEVERE CHRONIC OBSTRUCTIVE PULMONARY DISEASE (H): Chronic | ICD-10-CM

## 2017-09-19 ENCOUNTER — APPOINTMENT (OUTPATIENT)
Dept: CT IMAGING | Facility: CLINIC | Age: 47
End: 2017-09-19
Attending: EMERGENCY MEDICINE
Payer: MEDICARE

## 2017-09-19 ENCOUNTER — HOSPITAL ENCOUNTER (EMERGENCY)
Facility: CLINIC | Age: 47
Discharge: HOME | End: 2017-09-19
Attending: EMERGENCY MEDICINE | Admitting: EMERGENCY MEDICINE
Payer: MEDICARE

## 2017-09-19 ENCOUNTER — APPOINTMENT (OUTPATIENT)
Dept: ULTRASOUND IMAGING | Facility: CLINIC | Age: 47
End: 2017-09-19
Attending: EMERGENCY MEDICINE
Payer: MEDICARE

## 2017-09-19 VITALS
SYSTOLIC BLOOD PRESSURE: 113 MMHG | DIASTOLIC BLOOD PRESSURE: 67 MMHG | HEART RATE: 99 BPM | RESPIRATION RATE: 18 BRPM | OXYGEN SATURATION: 95 % | TEMPERATURE: 97.9 F

## 2017-09-19 DIAGNOSIS — R10.13 ABDOMINAL PAIN, EPIGASTRIC: ICD-10-CM

## 2017-09-19 LAB
ALBUMIN SERPL-MCNC: 3.6 G/DL (ref 3.4–5)
ALP SERPL-CCNC: 163 U/L (ref 40–150)
ALT SERPL W P-5'-P-CCNC: 40 U/L (ref 0–50)
ANION GAP SERPL CALCULATED.3IONS-SCNC: 8 MMOL/L (ref 3–14)
AST SERPL W P-5'-P-CCNC: 20 U/L (ref 0–45)
BASOPHILS # BLD AUTO: 0 10E9/L (ref 0–0.2)
BASOPHILS NFR BLD AUTO: 0.4 %
BILIRUB SERPL-MCNC: 0.4 MG/DL (ref 0.2–1.3)
BUN SERPL-MCNC: 16 MG/DL (ref 7–30)
CALCIUM SERPL-MCNC: 9.3 MG/DL (ref 8.5–10.1)
CHLORIDE SERPL-SCNC: 104 MMOL/L (ref 94–109)
CO2 SERPL-SCNC: 25 MMOL/L (ref 20–32)
CREAT SERPL-MCNC: 0.82 MG/DL (ref 0.52–1.04)
DIFFERENTIAL METHOD BLD: NORMAL
EOSINOPHIL # BLD AUTO: 0 10E9/L (ref 0–0.7)
EOSINOPHIL NFR BLD AUTO: 0 %
ERYTHROCYTE [DISTWIDTH] IN BLOOD BY AUTOMATED COUNT: 12.9 % (ref 10–15)
GFR SERPL CREATININE-BSD FRML MDRD: 75 ML/MIN/1.7M2
GLUCOSE SERPL-MCNC: 91 MG/DL (ref 70–99)
HCT VFR BLD AUTO: 45 % (ref 35–47)
HGB BLD-MCNC: 15.1 G/DL (ref 11.7–15.7)
IMM GRANULOCYTES # BLD: 0.1 10E9/L (ref 0–0.4)
IMM GRANULOCYTES NFR BLD: 0.5 %
LIPASE SERPL-CCNC: 131 U/L (ref 73–393)
LYMPHOCYTES # BLD AUTO: 3.7 10E9/L (ref 0.8–5.3)
LYMPHOCYTES NFR BLD AUTO: 36.1 %
MCH RBC QN AUTO: 30 PG (ref 26.5–33)
MCHC RBC AUTO-ENTMCNC: 33.6 G/DL (ref 31.5–36.5)
MCV RBC AUTO: 90 FL (ref 78–100)
MONOCYTES # BLD AUTO: 0.6 10E9/L (ref 0–1.3)
MONOCYTES NFR BLD AUTO: 6.2 %
NEUTROPHILS # BLD AUTO: 5.8 10E9/L (ref 1.6–8.3)
NEUTROPHILS NFR BLD AUTO: 56.8 %
NRBC # BLD AUTO: 0 10*3/UL
NRBC BLD AUTO-RTO: 0 /100
PLATELET # BLD AUTO: 414 10E9/L (ref 150–450)
POTASSIUM SERPL-SCNC: 3.9 MMOL/L (ref 3.4–5.3)
PROT SERPL-MCNC: 8 G/DL (ref 6.8–8.8)
RBC # BLD AUTO: 5.03 10E12/L (ref 3.8–5.2)
SODIUM SERPL-SCNC: 137 MMOL/L (ref 133–144)
WBC # BLD AUTO: 10.1 10E9/L (ref 4–11)

## 2017-09-19 PROCEDURE — 74177 CT ABD & PELVIS W/CONTRAST: CPT

## 2017-09-19 PROCEDURE — 25000128 H RX IP 250 OP 636: Performed by: EMERGENCY MEDICINE

## 2017-09-19 PROCEDURE — 80053 COMPREHEN METABOLIC PANEL: CPT | Performed by: EMERGENCY MEDICINE

## 2017-09-19 PROCEDURE — 83690 ASSAY OF LIPASE: CPT | Performed by: EMERGENCY MEDICINE

## 2017-09-19 PROCEDURE — 85025 COMPLETE CBC W/AUTO DIFF WBC: CPT | Performed by: EMERGENCY MEDICINE

## 2017-09-19 PROCEDURE — 96374 THER/PROPH/DIAG INJ IV PUSH: CPT

## 2017-09-19 PROCEDURE — 25000125 ZZHC RX 250: Performed by: EMERGENCY MEDICINE

## 2017-09-19 PROCEDURE — 76705 ECHO EXAM OF ABDOMEN: CPT

## 2017-09-19 PROCEDURE — A9270 NON-COVERED ITEM OR SERVICE: HCPCS | Mod: GY | Performed by: EMERGENCY MEDICINE

## 2017-09-19 PROCEDURE — 25000132 ZZH RX MED GY IP 250 OP 250 PS 637: Mod: GY | Performed by: EMERGENCY MEDICINE

## 2017-09-19 PROCEDURE — 96375 TX/PRO/DX INJ NEW DRUG ADDON: CPT

## 2017-09-19 PROCEDURE — 96361 HYDRATE IV INFUSION ADD-ON: CPT

## 2017-09-19 PROCEDURE — 99285 EMERGENCY DEPT VISIT HI MDM: CPT | Mod: 25

## 2017-09-19 PROCEDURE — 96376 TX/PRO/DX INJ SAME DRUG ADON: CPT

## 2017-09-19 RX ORDER — ONDANSETRON 2 MG/ML
4 INJECTION INTRAMUSCULAR; INTRAVENOUS ONCE
Status: COMPLETED | OUTPATIENT
Start: 2017-09-19 | End: 2017-09-19

## 2017-09-19 RX ORDER — HYDROMORPHONE HYDROCHLORIDE 1 MG/ML
0.5 INJECTION, SOLUTION INTRAMUSCULAR; INTRAVENOUS; SUBCUTANEOUS
Status: COMPLETED | OUTPATIENT
Start: 2017-09-19 | End: 2017-09-19

## 2017-09-19 RX ORDER — SUCRALFATE ORAL 1 G/10ML
1 SUSPENSION ORAL 4 TIMES DAILY PRN
Qty: 200 ML | Refills: 0 | Status: SHIPPED | OUTPATIENT
Start: 2017-09-19 | End: 2017-09-24

## 2017-09-19 RX ORDER — IOPAMIDOL 755 MG/ML
500 INJECTION, SOLUTION INTRAVASCULAR ONCE
Status: COMPLETED | OUTPATIENT
Start: 2017-09-19 | End: 2017-09-19

## 2017-09-19 RX ADMIN — HYDROMORPHONE HYDROCHLORIDE 0.5 MG: 1 INJECTION, SOLUTION INTRAMUSCULAR; INTRAVENOUS; SUBCUTANEOUS at 19:16

## 2017-09-19 RX ADMIN — ONDANSETRON 4 MG: 2 INJECTION INTRAMUSCULAR; INTRAVENOUS at 17:13

## 2017-09-19 RX ADMIN — SODIUM CHLORIDE 1000 ML: 9 INJECTION, SOLUTION INTRAVENOUS at 17:13

## 2017-09-19 RX ADMIN — SODIUM CHLORIDE 64 ML: 9 INJECTION, SOLUTION INTRAVENOUS at 18:14

## 2017-09-19 RX ADMIN — IOPAMIDOL 98 ML: 755 INJECTION, SOLUTION INTRAVENOUS at 18:14

## 2017-09-19 RX ADMIN — LIDOCAINE HYDROCHLORIDE 30 ML: 20 SOLUTION ORAL; TOPICAL at 16:22

## 2017-09-19 RX ADMIN — HYDROMORPHONE HYDROCHLORIDE 0.5 MG: 1 INJECTION, SOLUTION INTRAMUSCULAR; INTRAVENOUS; SUBCUTANEOUS at 17:14

## 2017-09-19 RX ADMIN — PANTOPRAZOLE SODIUM 40 MG: 40 INJECTION, POWDER, FOR SOLUTION INTRAVENOUS at 17:15

## 2017-09-19 RX ADMIN — HYDROMORPHONE HYDROCHLORIDE 0.5 MG: 1 INJECTION, SOLUTION INTRAMUSCULAR; INTRAVENOUS; SUBCUTANEOUS at 17:35

## 2017-09-19 ASSESSMENT — ENCOUNTER SYMPTOMS
VOMITING: 1
FLANK PAIN: 1
ABDOMINAL PAIN: 1
NAUSEA: 1

## 2017-09-19 NOTE — ED AVS SNAPSHOT
[facility] Emergency Department    [address]    [facility] [address]    Phone:  [phone]    Fax:  [phone]                                       Swetha Patterson   MRN: [id number]    Department:  [facility] Emergency Department   Date of Visit:  9/19/2017           Patient Information     Date Of Birth          1970        Your diagnoses for this visit were:     Abdominal pain, epigastric        You were seen by Canelo Faith MD.      Follow-up Information     Follow up with Roro Chawla MD.    Specialty:  Internal Medicine    Contact information:    [address] [facility] [address]  [phone]          Follow up with [facility] [place] [facility]. Call in 1 day.    Why:  call tomorrow AM to schedule endoscopy for further evaluation and treatment     Contact information:    [address] [address]  [facility] [address] [phone]          Follow up with [facility] Emergency Department.    Specialty:  EMERGENCY MEDICINE    Why:  As needed, If symptoms worsen    Contact information:    [address]  [facility] [id number] [phone]      Discharge References/Attachments     EPIGASTRIC PAIN (UNCERTAIN CAUSE) (ENGLISH)      Future Appointments        Provider Department Dept Phone Center    9/25/2017 8:20 AM Roro Chawla MD [facility] [phone] [place]    9/28/2017 1:45 PM Milena Joseph NP [facility] [phone] [place]    10/25/2017 12:40 PM [facility] IMAGING [place] ROOM 1 [facility] [phone] [facility]    10/25/2017 1:00 PM Pulmonary Function Lab [facility] Pulmonary Function Testing [phone] [facility]    10/25/2017 2:00 PM Nita Williamson MD [facility] [phone] [facility]    11/9/2017 1:00 PM Agustin Palomares MD [facility] [phone] [facility]      24 Hour Appointment Hotline        To make an appointment at any [facility], call [phone] ([phone]). If you don't have a family doctor or clinic, we will help you find one. [facility] clinics are conveniently located to serve the needs of you and your family.          ED Discharge Orders     GASTROENTEROLOGY ADULT REF PROCEDURE ONLY       Last Lab Result: Creatinine (mg/dL)       Date                     Value                 09/19/2017               0.82             ----------  There is no height or weight on file to calculate BMI.     Needed:  No  Language:  English    Patient will be contacted to schedule procedure.     Please be aware that coverage of these services is subject to the terms and limitations of your health insurance plan.  Call member services at your health plan with any benefit or coverage questions.  Any procedures must be performed at a [facility] facility OR coordinated by your clinic's referral office.    Please bring the following with you to your appointment:    (1) Any X-Rays, CTs or MRIs which have been performed.  Contact the facility where they were done to arrange for  prior to your scheduled appointment.    (2) List of current medications   (3) This referral request   (4) Any documents/labs given to you for this referral            UPPER GI ENDOSCOPY                    Review of your medicines      START taking        Dose / Directions Last dose taken    omeprazole 20 MG CR capsule   Commonly known as:  priLOSEC   Dose:  20 mg   Quantity:  14 capsule        Take 1 capsule (20 mg) by mouth daily for 14 days   Refills:  0        sucralfate 1 GM/10ML suspension   Commonly known as:  CARAFATE   Dose:  1 g   Quantity:  200 mL        Take 10 mLs (1 g) by mouth 4 times daily as needed   Refills:  0          Our records show that you are taking the medicines listed below. If these are incorrect, please call your family doctor or clinic.        Dose / Directions Last dose taken    albuterol  108 (90 BASE) MCG/ACT Inhaler   Commonly known as:  VENTOLIN HFA   Dose:  2 puff   Quantity:  3 Inhaler        Inhale 2 puffs into the lungs every 6 hours   Refills:  3        amLODIPine 10 MG tablet   Commonly known as:  NORVASC   Dose:  10 mg   Quantity:  90 tablet        Take 1 tablet (10 mg) by mouth daily   Refills:  1        atorvastatin 80 MG tablet   Commonly known as:  LIPITOR   Dose:  80 mg   Quantity:  90 tablet        Take 1 tablet (80 mg) by mouth daily   Refills:  1        budesonide-formoterol 160-4.5 MCG/ACT Inhaler   Commonly known as:  SYMBICORT   Dose:  2 puff   Quantity:  3 Inhaler        Inhale 2 puffs into the lungs 2 times daily   Refills:  3        cetirizine HCl 10 MG Caps   Dose:  1 capsule   Quantity:  90 capsule        Take 1 capsule by mouth daily   Refills:  3        chlorhexidine 4 % liquid   Commonly known as:  HIBICLENS   Quantity:  946 mL        Wash ab, groin, thighs daily in shower DO NOT PUT ON FACE   Refills:  3        clindamycin-benzoyl peroxide gel   Commonly known as:  BENZACLIN   Quantity:  100 g        Apply topically 2 times daily   Refills:  3        desonide 0.05 % cream   Commonly known as:  DESOWEN   Quantity:  60 g        Apply sparingly to affected area three times daily as needed.   Refills:  0        diazepam 10 MG tablet   Commonly known as:  VALIUM   Dose:  10 mg   Quantity:  90 tablet        Take 1 tablet (10 mg) by mouth every 8 hours as needed for anxiety or sleep For sleep, anxiety, and muscle tension.   Refills:  0        doxepin 10 MG capsule   Commonly known as:  SINEquan   Dose:  10 mg   Quantity:  30 capsule        Take 1 capsule (10 mg) by mouth At Bedtime   Refills:  1        escitalopram 20 MG tablet   Commonly known as:  LEXAPRO   Dose:  20 mg   Quantity:  90 tablet        Take 1 tablet (20 mg) by mouth daily Increased dose.   Refills:  1        estradiol 0.1 MG/GM cream   Commonly known as:  ESTRACE VAGINAL   Quantity:  42.5 g        1 gm pv  nightly at bedtime for 2 weeks, then twice a week at bedtime for maintenance.   Refills:  6        fluticasone 50 MCG/ACT spray   Commonly known as:  FLONASE   Dose:  2 spray   Quantity:  16 g        Spray 2 sprays into both nostrils daily   Refills:  11        furosemide 20 MG tablet   Commonly known as:  LASIX   Dose:  20 mg   Quantity:  90 tablet        Take 1 tablet (20 mg) by mouth daily as needed   Refills:  1        hydrOXYzine 50 MG tablet   Commonly known as:  ATARAX   Dose:  50 mg   Quantity:  120 tablet        Take 1 tablet (50 mg) by mouth 4 times daily as needed for anxiety Increased dose. For anxiety   Refills:  0        ipratropium - albuterol 0.5 mg/2.5 mg/3 mL 0.5-2.5 (3) MG/3ML neb solution   Commonly known as:  DUONEB   Dose:  1 vial   Quantity:  360 mL        Take 1 vial (3 mLs) by nebulization every 6 hours   Refills:  0        irbesartan 300 MG tablet   Commonly known as:  AVAPRO   Dose:  300 mg   Quantity:  90 tablet        Take 1 tablet (300 mg) by mouth daily   Refills:  1        ketoconazole 2 % shampoo   Commonly known as:  NIZORAL   Quantity:  120 mL        Apply to the affected area and wash off after 5 minutes.   Refills:  1        lisinopril 20 MG tablet   Commonly known as:  PRINIVIL/ZESTRIL   Dose:  20 mg   Quantity:  90 tablet        Take 1 tablet (20 mg) by mouth daily   Refills:  1        montelukast 10 MG tablet   Commonly known as:  SINGULAIR   Dose:  10 mg   Quantity:  30 tablet        Take 1 tablet (10 mg) by mouth At Bedtime   Refills:  1        NICORETTE 2 MG lozenge   Generic drug:  nicotine polacrilex        PLACE 1 LOZENGE INSIDE CHEEK Q HOUR PRF SMOKING CESSATION UTD   Refills:  1        ondansetron 8 MG tablet   Commonly known as:  ZOFRAN   Dose:  4-8 mg   Quantity:  30 tablet        Take 0.5-1 tablets (4-8 mg) by mouth every 8 hours as needed for nausea   Refills:  1        order for DME   Quantity:  1 Device        Equipment being ordered: Nebulizer machine Length of  need-lifetime   Refills:  0        oxyCODONE 10 MG IR tablet   Commonly known as:  ROXICODONE   Quantity:  150 tablet        5 times daily as needed   Refills:  0        predniSONE 10 MG tablet   Commonly known as:  DELTASONE   Quantity:  60 tablet        Take the prednisone as directed by the doctor   Refills:  0        prochlorperazine 5 MG tablet   Commonly known as:  COMPAZINE   Dose:  5 mg   Quantity:  90 tablet        Take 1 tablet (5 mg) by mouth every 6 hours as needed for nausea or vomiting   Refills:  0        ranitidine 75 MG tablet   Commonly known as:  ZANTAC   Dose:  150 mg   Quantity:  90 tablet        Take 2 tablets (150 mg) by mouth daily   Refills:  1        tiotropium 18 MCG capsule   Commonly known as:  SPIRIVA HANDIHALER   Quantity:  30 capsule        Inhale contents of one capsule daily.   Refills:  1        varenicline 0.5 MG X 11 & 1 MG X 42 tablet   Commonly known as:  CHANTIX STARTING MONTH CHAPITO   Quantity:  53 tablet        Take 0.5 mg tab daily for 3 days, then 0.5 mg tab twice daily for 4 days, then 1 mg twice daily.   Refills:  0        venlafaxine 37.5 MG 24 hr capsule   Commonly known as:  EFFEXOR-XR   Quantity:  46 capsule        Take 1 capsule daily for 14 days, then take 2 capsules daily.   Refills:  0        VITAMIN D (CHOLECALCIFEROL) PO   Dose:  2000 Units        Take 2,000 Units by mouth daily   Refills:  0          STOP taking        Dose Reason for stopping Comments    doxycycline Monohydrate 100 MG Tabs              pantoprazole 20 MG EC tablet   Commonly known as:  PROTONIX                      Prescriptions were sent or printed at these locations (2 Prescriptions)                   Other Prescriptions                Printed at Department/Unit printer (2 of 2)         omeprazole (PRILOSEC) 20 MG CR capsule               sucralfate (CARAFATE) 1 GM/10ML suspension                Procedures and tests performed during your visit     Abdomen US, limited (RUQ only)    CBC with  platelets differential    CT Abdomen Pelvis w Contrast    Comprehensive metabolic panel    Lipase      Orders Needing Specimen Collection     None      Pending Results     No orders found from 9/17/2017 to 9/20/2017.            Pending Culture Results     No orders found from 9/17/2017 to 9/20/2017.            Pending Results Instructions     If you had any lab results that were not finalized at the time of your Discharge, you can call the ED Lab Result RN at [phone]. You will be contacted by this team for any positive Lab results or changes in treatment. The nurses are available 7 days a week from 10A to 6:30P.  You can leave a message 24 hours per day and they will return your call.        Test Results From Your Hospital Stay        9/19/2017  5:17 PM      Component Results     Component Value Ref Range & Units Status    WBC 10.1 4.0 - 11.0 10e9/L Final    RBC Count 5.03 3.8 - 5.2 10e12/L Final    Hemoglobin 15.1 11.7 - 15.7 g/dL Final    Hematocrit 45.0 35.0 - 47.0 % Final    MCV 90 78 - 100 fl Final    MCH 30.0 26.5 - 33.0 pg Final    MCHC 33.6 31.5 - 36.5 g/dL Final    RDW 12.9 10.0 - 15.0 % Final    Platelet Count 414 150 - 450 10e9/L Final    Diff Method Automated Method  Final    % Neutrophils 56.8 % Final    % Lymphocytes 36.1 % Final    % Monocytes 6.2 % Final    % Eosinophils 0.0 % Final    % Basophils 0.4 % Final    % Immature Granulocytes 0.5 % Final    Nucleated RBCs 0 0 /100 Final    Absolute Neutrophil 5.8 1.6 - 8.3 10e9/L Final    Absolute Lymphocytes 3.7 0.8 - 5.3 10e9/L Final    Absolute Monocytes 0.6 0.0 - 1.3 10e9/L Final    Absolute Eosinophils 0.0 0.0 - 0.7 10e9/L Final    Absolute Basophils 0.0 0.0 - 0.2 10e9/L Final    Abs Immature Granulocytes 0.1 0 - 0.4 10e9/L Final    Absolute Nucleated RBC 0.0  Final         9/19/2017  5:33 PM      Component Results     Component Value Ref Range & Units Status    Sodium 137 133 - 144 mmol/L Final    Potassium 3.9 3.4 - 5.3 mmol/L Final    Chloride  104 94 - 109 mmol/L Final    Carbon Dioxide 25 20 - 32 mmol/L Final    Anion Gap 8 3 - 14 mmol/L Final    Glucose 91 70 - 99 mg/dL Final    Urea Nitrogen 16 7 - 30 mg/dL Final    Creatinine 0.82 0.52 - 1.04 mg/dL Final    GFR Estimate 75 >60 mL/min/1.7m2 Final    Non  GFR Calc    GFR Estimate If Black >90 >60 mL/min/1.7m2 Final    African American GFR Calc    Calcium 9.3 8.5 - 10.1 mg/dL Final    Bilirubin Total 0.4 0.2 - 1.3 mg/dL Final    Albumin 3.6 3.4 - 5.0 g/dL Final    Protein Total 8.0 6.8 - 8.8 g/dL Final    Alkaline Phosphatase 163 (H) 40 - 150 U/L Final    ALT 40 0 - 50 U/L Final    AST 20 0 - 45 U/L Final         9/19/2017  5:33 PM      Component Results     Component Value Ref Range & Units Status    Lipase 131 73 - 393 U/L Final         9/19/2017  5:52 PM      Narrative     LIMITED ABDOMINAL (RIGHT UPPER QUADRANT) ULTRASOUND  9/19/2017 5:32 PM    HISTORY: Right upper quadrant pain.    COMPARISON: None.    FINDINGS: The liver is normal in size. It demonstrates diffuse  increased echogenicity without focal lesions. The gallbladder is  normal without stones or sludge. No gallbladder wall thickening or  pericholecystic fluid is seen. The common bile duct is normal  measuring 0.3 cm in diameter. The visualized portion of the pancreas  is normal. The right kidney is normal with no hydronephrosis or  abnormal mass.        Impression     IMPRESSION: Diffuse fatty infiltration of the liver. Otherwise  unremarkable examination.    GINA COOPER MD         9/19/2017  6:47 PM      Narrative     CT ABDOMEN AND PELVIS WITH CONTRAST   9/19/2017 6:23 PM     HISTORY: Epigastric pain.    TECHNIQUE: 98mL Isovue-370 IV were administered. After contrast  administration, volumetric helical sections were acquired from the  lung bases to the ischial tuberosities. Coronal images were also  reconstructed. Radiation dose for this scan was reduced using  automated exposure control, adjustment of the mA  and/or kV according  to patient size, or iterative reconstruction technique.    COMPARISON: None.     FINDINGS: No bowel obstruction. No convincing evidence for colitis or  diverticulitis. The appendix is not visualized. No free fluid in the  pelvis. Mild atherosclerotic aortoiliac calcification. Nonobstructing  0.3 cm stone in the lower pole of the right kidney. There is also a  nonobstructing 0.3 cm stone noted in the lower pole of the left  kidney. The liver, gallbladder, spleen, adrenal glands, pancreas, and  kidneys are otherwise unremarkable. No hydronephrosis. Mild scarring  or atelectasis at both lung bases posteriorly. The visualized lung  bases are otherwise clear.        Impression     IMPRESSION:   1. No acute abnormality in the abdomen or pelvis. No cause for  abdominal pain is identified.  2. Small nonobstructing stones in the lower poles of both kidneys.         CHRISTINA ZENG MD                Clinical Quality Measure: Blood Pressure Screening     Your blood pressure was checked while you were in the emergency department today. The last reading we obtained was  BP: (!) 118/103 . Please read the guidelines below about what these numbers mean and what you should do about them.  If your systolic blood pressure (the top number) is less than 120 and your diastolic blood pressure (the bottom number) is less than 80, then your blood pressure is normal. There is nothing more that you need to do about it.  If your systolic blood pressure (the top number) is 120-139 or your diastolic blood pressure (the bottom number) is 80-89, your blood pressure may be higher than it should be. You should have your blood pressure rechecked within a year by a primary care provider.  If your systolic blood pressure (the top number) is 140 or greater or your diastolic blood pressure (the bottom number) is 90 or greater, you may have high blood pressure. High blood pressure is treatable, but if left untreated over time it can  "put you at risk for heart attack, stroke, or kidney failure. You should have your blood pressure rechecked by a primary care provider within the next 4 weeks.  If your provider in the emergency department today gave you specific instructions to follow-up with your doctor or provider even sooner than that, you should follow that instruction and not wait for up to 4 weeks for your follow-up visit.        Thank you for choosing [facility]       Thank you for choosing [facility] for your care. Our goal is always to provide you with excellent care. Hearing back from our patients is one way we can continue to improve our services. Please take a few minutes to complete the written survey that you may receive in the mail after you visit with us. Thank you!        [employer] Information     [employer] lets you send messages to your doctor, view your test results, renew your prescriptions, schedule appointments and more. To sign up, go to [email or web address] . Click on \"Log in\" on the left side of the screen, which will take you to the Welcome page. Then click on \"Sign up Now\" on the right side of the page.     You will be asked to enter the access code listed below, as well as some personal information. Please follow the directions to create your username and password.     Your access code is: [email or web address]  Expires: 10/1/2017  7:07 PM     Your access code will  in 90 days. If you need help or a new code, please call your [facility] clinic or [phone].        Care EveryWhere ID     This is your Care EveryWhere ID. This could be used by other organizations to access your [facility] medical records  [id number]        Equal Access to Services     [facility] : Hadii skinny thorpe Sozbigniew, waaxda luqadaha, qaybta kaalmacaryl payne . So [facility] [phone].    ATENCIÓN: Si habla español, tiene a vargas disposición servicios gratuitos de asistencia lingüística. Llame al [phone].    We " comply with applicable federal civil rights laws and [place] laws. We do not discriminate on the basis of race, color, national origin, age, disability sex, sexual orientation or gender identity.            After Visit Summary       This is your record. Keep this with you and show to your community pharmacist(s) and doctor(s) at your next visit.

## 2017-09-19 NOTE — ED PROVIDER NOTES
History     Chief Complaint:  Abdominal pain    HPI   Swetha Patterson is a 47 year old female with a history of GERD who presents with abdominal pain. The patient states that she has been experiencing right sided abdominal pain that radiates into her back for the past three weeks. This pain is described as being similar to heart burn. She notes that two weeks ago she vomited up with two clots noticed. Last night the pain worsened and she has been vomiting every time she has tried to eat since. This pain worsened after she was eating a cheeseburger. The patient has taken oxycodone once with some relief of her pain. Her only history of surgery was an appendectomy when she was a child. She denies any history of pancreatitis, ulcers, or any other abdominal etiologies. She does note that she was on protonix in the past, but stopped. She does take something for her stomach but isn't sure what it is.     Allergies:  Codeine  Hydrocodone  Sulfa drugs  Gabapentin    Medications:    Deltasone  Oxycodone  Valium  Valium  Atarax  Chantix  Lexapro  Estradiol  Zofran  Flonase  Zantac  Lipitor  Amlodipine  Lisinopril    Past Medical History:    Anxiety  Arthritis  Asthma  Chronic pain  Contact dermatitis and other eczema  COPD  Depressive disorder  Emphysema with chronic bronchitis  GERD  Hoarseness  Hypertension  Hyperlipidemia  Pneumonia  Polyp of vocal cord or larynx  PONV    Past Surgical History:    Arthroscopy  Bronchial theroplasty  Appendectomy  Excise node mediastinal  Tonsillectomy    Family History:    Heart murmur  Hypertension  Cerebrovascular disease  Depression  Gallbladder disease  Asthma    Social History:  Smoking Status: Yes  Smokeless Tobacco: No  Alcohol Use: No   Marital Status:  Single [1]    Review of Systems   Gastrointestinal: Positive for abdominal pain, nausea and vomiting.   Genitourinary: Positive for flank pain.   All other systems reviewed and are negative.    Physical Exam   Vitals:  Patient  Vitals for the past 24 hrs:   BP SpO2   09/19/17 1930 113/67 95 %   09/19/17 1915 104/85 95 %   09/19/17 1900 (!) 130/103 98 %   09/19/17 1800 (!) 118/103 94 %   09/19/17 1745 (!) 103/93 95 %   09/19/17 1715 114/81 98 %   09/19/17 1700 132/61 96 %   09/19/17 1645 (!) 144/96 97 %       Physical Exam  Nursing note and vitals reviewed.  Constitutional: Cooperative.   HENT:   Mouth/Throat: Moist mucous membranes.   Eyes: EOMI, nonicteric sclera  Cardiovascular: Normal rate, regular rhythm, no murmurs, rubs, or gallops  Pulmonary/Chest: Effort normal and breath sounds normal. No respiratory distress. No wheezes. No rales.   Abdominal: Soft. Epigastric TTP with some RUQ/LUQ pain, nondistended, no guarding or rigidity. BS present.   Musculoskeletal: Normal range of motion.   Neurological: Alert. Moves all extremities spontaneously.   Skin: Skin is warm and dry. No rash noted.   Psychiatric: Normal mood and affect.       Emergency Department Course     Imaging:  Radiology findings were communicated with the patient who voiced understanding of the findings.  Abdomen US limited RUQ only:  IMPRESSION: Diffuse fatty infiltration of the liver. Otherwise  unremarkable examination.  Reading per radiology.     CT Abdomen pelvis w contrast:   IMPRESSION:   1. No acute abnormality in the abdomen or pelvis. No cause for  abdominal pain is identified.  2. Small nonobstructing stones in the lower poles of both kidneys.  Reading per radiology.     Laboratory:  Laboratory findings were communicated with the patient who voiced understanding of the findings.  CBC: AWNL (WBC 10.1, HGB 15.1, )  CMP: Alkphos: 163(H), ow WNL (Creatinine 0.82)   Lipase: 131    Interventions:  1622 GI Cocktail (Maalox/Mylanta and viscous Lidocaine), 30 mL suspension, PO    1713 Normal Saline 1000 mL IV  1713 Zofran 4 mg IV  1714 Dilaudid 0.5 mg IV  1715 Protonix 40 mg IV  1735 Dilaudid 0.5 mg IV    Emergency Department Course:  Nursing notes and vitals  reviewed.  I performed an exam of the patient as documented above.   IV was inserted and blood was drawn for laboratory testing, results above.  The patient was sent for a US & CT while in the emergency department, results above.      1854 I rechecked with the patient    I personally reviewed the laboratory results with the Patient and answered all related questions prior to discharge.    Impression & Plan      Medical Decision Making:  Swetha Patterson is a 47 year old female who presents to the emergency department today with epigastric abdominal pain. Differential includes cholecystitis, cholelithiasis, gastritis/GERD, ulcer, pancreatitis, among many other etiologies. Patient is mostly tender in her epigastric area with a negative Sen's sign. Given the history of worsening following eating, I found it appropriate to evaluate for cholecystitis with right upper quadrant ultrasound which was fortunately negative. Given the persistence of patient's pain, which she describes as severe, I did obtain a CT abdomen/pelvis to rule out a perforation, abscesses, among other etiologies. CT also appears normal. Incidental findings of fatty liver and nondescended stones. Lipase is normal - no evidence of pancreatitis. Pt describes some symptoms of esophageal problems, but she notably is handling her secretions, and also took GI cocktail without vomiting or spitting it back up. At this time I am uncertain of the etiology of the patient's pain, however I do suspect gastritis/GERD/ulcer as the cause. She will need a follow up with gastroenterology for further evaluation and treatment. We will start her on PPI an H2 blockers and she will have follow up information for gastroenterology. Pt has narcotics at home. We discussed foods to avoid at length, and I encouraged her to stick to a bland mostly liquid diet until she sees GI. Epic order for EGD placed. She is in stable condition at the time of discharge, indications for return  to the ED were discussed as well as follow up. All questions were answered and she is in agreement with the plan.     Diagnosis:    ICD-10-CM    1. Abdominal pain, epigastric R10.13 GASTROENTEROLOGY ADULT REF PROCEDURE ONLY     UPPER GI ENDOSCOPY        Disposition:   Discharged    CMS Diagnoses: None     Discharge Medications:  Discharge Medication List as of 9/19/2017  7:12 PM      START taking these medications    Details   omeprazole (PRILOSEC) 20 MG CR capsule Take 1 capsule (20 mg) by mouth daily for 14 days, Disp-14 capsule, R-0, Local Print      sucralfate (CARAFATE) 1 GM/10ML suspension Take 10 mLs (1 g) by mouth 4 times daily as needed, Disp-200 mL, R-0, Local Print           Scribe Disclosure:  I, Bernardo Hinds, am serving as a scribe at 4:51 PM on 9/19/2017 to document services personally performed by Canelo Faith MD, based on my observations and the provider's statements to me.   [facility] EMERGENCY DEPARTMENT       Canelo Faith MD  09/20/17 8204

## 2017-09-19 NOTE — LETTER
To Whom it may concern:      Swetha Patterson was seen in our Emergency Department today, 09/19/17.  I expect her condition to improve over the next 2 days.  she may return to work/school when improved.    Sincerely,      Jen Agarwal RN, BSN

## 2017-09-19 NOTE — ED AVS SNAPSHOT
[facility] Emergency Department    [address]    [facility] [address]    Phone:  [phone]    Fax:  [phone]                                       Swetha Patterson   MRN: [id number]    Department:  [facility] Emergency Department   Date of Visit:  9/19/2017           After Visit Summary Signature Page     I have received my discharge instructions, and my questions have been answered. I have discussed any challenges I see with this plan with the nurse or doctor.    ..........................................................................................................................................  Patient/Patient Representative Signature      ..........................................................................................................................................  Patient Representative Print Name and Relationship to Patient    ..................................................               ................................................  Date                                            Time    ..........................................................................................................................................  Reviewed by Signature/Title    ...................................................              ..............................................  Date                                                            Time

## 2017-09-20 ENCOUNTER — TELEPHONE (OUTPATIENT)
Dept: INTERNAL MEDICINE | Facility: CLINIC | Age: 47
End: 2017-09-20

## 2017-09-20 NOTE — TELEPHONE ENCOUNTER
Reason for Call: Request for an order or referral:Referral    Order or referral being requested: Gastroenterology    Date needed: STAT    Has the patient been seen by the PCP for this problem? Yes    Additional comments: Pt at [facility] was told to be seen by Gastroenterologist, Requesting Referral to Gastro.  Pt sick and wants to be seen asap    Phone number Patient can be reached at:  Cell number on file:    Telephone Information:   Mobile [phone]       Best Time:  anytime    Can we leave a detailed message on this number?  YES    Call taken on 9/20/2017 at 12:21 PM by SAMIR REYNOLDS

## 2017-09-21 ENCOUNTER — TELEPHONE (OUTPATIENT)
Dept: INTERNAL MEDICINE | Facility: CLINIC | Age: 47
End: 2017-09-21

## 2017-09-21 DIAGNOSIS — R11.0 NAUSEA: ICD-10-CM

## 2017-09-21 DIAGNOSIS — K29.00 ACUTE GASTRITIS, PRESENCE OF BLEEDING UNSPECIFIED, UNSPECIFIED GASTRITIS TYPE: Primary | ICD-10-CM

## 2017-09-21 RX ORDER — ONDANSETRON 8 MG/1
4-8 TABLET, FILM COATED ORAL EVERY 8 HOURS PRN
Qty: 30 TABLET | Refills: 0 | Status: SHIPPED | OUTPATIENT
Start: 2017-09-21 | End: 2017-12-18

## 2017-09-21 NOTE — TELEPHONE ENCOUNTER
Pt is asking for something extra to help her with her stomach pain.  She says she has to have an endoscopy tomorrow because she is having so much pain in her stomach that she went to ER 9/19/17.  ER  suspects it's gastritis.  She can't hold anything down.       Pt says she doesn't want to use up all of her Oxycodone before she sees Dr Low on Monday.     Please have someone take it to [facility] if ok.

## 2017-09-21 NOTE — TELEPHONE ENCOUNTER
Referral generate during ER visit 2 days ago for [facility] to do an upper endoscopy.  Verbal order given over phone to  at [facility], pt aware.  SERJIO Chandra R.N.

## 2017-09-22 ENCOUNTER — NURSE TRIAGE (OUTPATIENT)
Dept: NURSING | Facility: CLINIC | Age: 47
End: 2017-09-22

## 2017-09-22 ENCOUNTER — TRANSFERRED RECORDS (OUTPATIENT)
Dept: HEALTH INFORMATION MANAGEMENT | Facility: CLINIC | Age: 47
End: 2017-09-22

## 2017-09-23 NOTE — TELEPHONE ENCOUNTER
"  Reason for Disposition    Hernia is painful or tender to touch    Additional Information    Negative: [1] Swelling of scrotum AND [2] has not previously been diagnosed with a hernia    Negative: SEVERE abdominal pain    Answer Assessment - Initial Assessment Questions  1. ONSET:  \"When did this first appear?\"      Ongoing  concern  2. APPEARANCE: \"What does it look like?\"      Bulge at base of ribs   3. SIZE: \"How big is it?\" (inches, cm or compare to coins, fruit)      Unsure, tender to palpate too much.   4. LOCATION: \"Where exactly is the hernia located?\"      Abdomen at lower edge of ribcage   5. PATTERN: \"Does the swelling come and go, or has it been constant since it started?\"      Constant   6. PAIN: \"Is there any pain?\" If so, ask: \"How bad is it?\"  (Scale 1-10; or mild, moderate, severe)      Pain at 7 out of 10   7. DIAGNOSIS: \"Have you been seen by a doctor for this?\" \"Did the doctor diagnose you as having a hernia?\"      YES, Today , possible gall bladder concern also   8. OTHER SYMPTOMS: \"Do you have any other symptoms?\" (e.g., fever, abdominal pain, vomiting)      No fever, yes pain, nausea, has vomited .   9. PREGNANCY: \"Is there any chance you are pregnant?\" \"When was your last menstrual period?\"      N/A    Protocols used: HERNIA-ADULT-GAURAV Smith saw the Gastroenterologist today, they diagnosed a hernia, and did 2 biopsies. She did not make the plan to repair the hernia, provider wanted the results of biopsies before determining. Currently she has an abdomen that is tender to touch, she can localize the pain , there is a bump there, she cant say its bulging, but it is very tender. She has not been able to drink or eat with out extreme nausea, and has only urinated once today . Pain is at 7 on scale of 1-10. If she moves too much has extreme heartburn/reflux occur. Due to hydration concerns, and pain  She will return to ED  For further help tonight.   "

## 2017-09-25 ENCOUNTER — OFFICE VISIT (OUTPATIENT)
Dept: INTERNAL MEDICINE | Facility: CLINIC | Age: 47
End: 2017-09-25
Payer: MEDICARE

## 2017-09-25 VITALS
BODY MASS INDEX: 34.6 KG/M2 | SYSTOLIC BLOOD PRESSURE: 136 MMHG | WEIGHT: 202.7 LBS | DIASTOLIC BLOOD PRESSURE: 80 MMHG | HEART RATE: 72 BPM | TEMPERATURE: 97.6 F | OXYGEN SATURATION: 97 % | HEIGHT: 64 IN

## 2017-09-25 DIAGNOSIS — M79.10 MUSCLE PAIN: ICD-10-CM

## 2017-09-25 DIAGNOSIS — R07.89 CHEST WALL PAIN: ICD-10-CM

## 2017-09-25 DIAGNOSIS — Z12.31 VISIT FOR SCREENING MAMMOGRAM: Primary | ICD-10-CM

## 2017-09-25 DIAGNOSIS — J44.9 SEVERE CHRONIC OBSTRUCTIVE PULMONARY DISEASE (H): Chronic | ICD-10-CM

## 2017-09-25 DIAGNOSIS — F41.9 ANXIETY: ICD-10-CM

## 2017-09-25 DIAGNOSIS — F11.20 CONTINUOUS OPIOID DEPENDENCE (H): Chronic | ICD-10-CM

## 2017-09-25 PROCEDURE — 99212 OFFICE O/P EST SF 10 MIN: CPT | Performed by: INTERNAL MEDICINE

## 2017-09-25 RX ORDER — OXYCODONE HYDROCHLORIDE 10 MG/1
TABLET ORAL
Qty: 150 TABLET | Refills: 0 | Status: SHIPPED | OUTPATIENT
Start: 2017-09-25 | End: 2017-09-26

## 2017-09-25 RX ORDER — DIAZEPAM 10 MG
10 TABLET ORAL EVERY 8 HOURS PRN
Qty: 90 TABLET | Refills: 0 | Status: SHIPPED | OUTPATIENT
Start: 2017-09-25 | End: 2017-10-24

## 2017-09-25 NOTE — NURSING NOTE
"Chief Complaint   Patient presents with     RECHECK     ER F/U     Refill Request       Initial /80 (BP Location: Right arm, Patient Position: Chair, Cuff Size: Adult Large)  Pulse 72  Temp 97.6  F (36.4  C) (Oral)  Ht 5' 4\" (1.626 m)  Wt 202 lb 11.2 oz (91.9 kg)  LMP 04/15/2016  SpO2 97%  Breastfeeding? No  BMI 34.79 kg/m2 Estimated body mass index is 34.79 kg/(m^2) as calculated from the following:    Height as of this encounter: 5' 4\" (1.626 m).    Weight as of this encounter: 202 lb 11.2 oz (91.9 kg).  Medication Reconciliation: complete   Carolyn Villarreal CMA      "

## 2017-09-25 NOTE — MR AVS SNAPSHOT
After Visit Summary   9/25/2017    Swetha Patterson    MRN: [id number]           Patient Information     Date Of Birth          1970        Visit Information        Provider Department      9/25/2017 8:20 AM Roro Chawla MD [facility]        Today's Diagnoses     Visit for screening mammogram    -  1    Chest wall pain        Chr Lung Disease - managed by the pulm dept ([facility])^^^        Continuous opioid dependence ([facility]) opioid for chr cough and chest pain ^^^^        Muscle pain        Anxiety           Follow-ups after your visit        Your next 10 appointments already scheduled     Sep 28, 2017  1:45 PM CDT   Return Visit with Milena Joseph NP   [facility] ([facility]    [address]  [address]  [facility] [address] [phone]            Oct 25, 2017 12:40 PM CDT   (Arrive by 12:25 PM)   CT CHEST W/O CONTRAST with UCCT1   [facility] [place] [facility] ([facility])    [address] [place] [address] [phone]           Please bring any scans or X-rays taken at other hospitals, if similar tests were done. Also bring a list of your medicines, including vitamins, minerals and over-the-counter drugs. It is safest to leave personal items at home.  Be sure to tell your doctor:   If you have any allergies.   If there s any chance you are pregnant.   If you are breastfeeding.   If you have any special needs.  You do not need to do anything special to prepare.  Please wear loose clothing, such as a sweat suit or jogging clothes. Avoid snaps, zippers and other metal. We may ask you to undress and put on a hospital gown.            Oct 25, 2017  1:00 PM CDT   FULL PULMONARY FUNCTION with  PFL D   [facility] Pulmonary Function Testing ([facility])    [address] [address] [phone]             "Oct 25, 2017  2:00 PM CDT   (Arrive by 1:45 PM)   Return Visit with Nita Williamson MD   [facility] ([facility])    [address] [place] [address]  3rd [facility] [id number] [phone]            Nov 09, 2017  1:00 PM CST   (Arrive by 12:30 PM)   NEW NECK with Agustin Palomares MD   [facility] ([facility])    [address] [place] [address]  4th [facility] [id number] [phone]              Future tests that were ordered for you today     Open Future Orders        Priority Expected Expires Ordered    MA Screening Digital Bilateral Routine  9/25/2018 9/25/2017            Who to contact     If you have questions or need follow up information about today's clinic visit or your schedule please contact [facility] directly at [phone].  Normal or non-critical lab and imaging results will be communicated to you by [employer], letter or phone within 4 business days after the clinic has received the results. If you do not hear from us within 7 days, please contact the clinic through [employer] or phone. If you have a critical or abnormal lab result, we will notify you by phone as soon as possible.  Submit refill requests through [employer] or call your pharmacy and they will forward the refill request to us. Please allow 3 business days for your refill to be completed.          Additional Information About Your Visit        [employer] Information     [employer] lets you send messages to your doctor, view your test results, renew your prescriptions, schedule appointments and more. To sign up, go to [email or web address] . Click on \"Log in\" on the left side of the screen, which will take you to the Welcome page. Then click on \"Sign up Now\" on the right side of the page.     You will be asked to enter the access code listed below, as well as some personal information. Please follow the directions to create " "your username and password.     Your access code is: [email or web address]  Expires: 10/1/2017  7:07 PM     Your access code will  in 90 days. If you need help or a new code, please call your [facility] clinic or [phone].        Care EveryWhere ID     This is your Care EveryWhere ID. This could be used by other organizations to access your [facility] medical records  [id number]        Your Vitals Were     Pulse Temperature Height Last Period Pulse Oximetry Breastfeeding?    72 97.6  F (36.4  C) (Oral) 5' 4\" (1.626 m) 04/15/2016 97% No    BMI (Body Mass Index)                   34.79 kg/m2            Blood Pressure from Last 3 Encounters:   17 136/80   17 113/67   17 128/64    Weight from Last 3 Encounters:   17 202 lb 11.2 oz (91.9 kg)   17 195 lb (88.5 kg)   17 199 lb (90.3 kg)                 Where to get your medicines      Some of these will need a paper prescription and others can be bought over the counter.  Ask your nurse if you have questions.     Bring a paper prescription for each of these medications     diazepam 10 MG tablet    oxyCODONE 10 MG IR tablet          Primary Care Provider Office Phone # Fax #    Roro Janine Chawla [phone]       [address] [facility] [address]        Equal Access to Services     DEBBIE MANDUJANO AH: Hadii skinny ledezma hadasho Soomaali, waaxda luqadaha, qaybta kaalmada caryl horvath adegualberto cowan . So [facility] [phone].    ATENCIÓN: Si habla español, tiene a vargas disposición servicios gratuitos de asistencia lingüística. Llame al [phone].    We comply with applicable federal civil rights laws and [place] laws. We do not discriminate on the basis of race, color, national origin, age, disability sex, sexual orientation or gender identity.            Thank you!     Thank you for choosing [facility]  for your care. Our goal is always to provide you with excellent care. " Hearing back from our patients is one way we can continue to improve our services. Please take a few minutes to complete the written survey that you may receive in the mail after your visit with us. Thank you!             Your Updated Medication List - Protect others around you: Learn how to safely use, store and throw away your medicines at www.disposemymeds.org.          This list is accurate as of: 9/25/17  8:44 AM.  Always use your most recent med list.                   Brand Name Dispense Instructions for use Diagnosis    albuterol 108 (90 BASE) MCG/ACT Inhaler    VENTOLIN HFA    3 Inhaler    Inhale 2 puffs into the lungs every 6 hours    Shortness of breath       amLODIPine 10 MG tablet    NORVASC    90 tablet    Take 1 tablet (10 mg) by mouth daily    HTN, goal below 140/90       atorvastatin 80 MG tablet    LIPITOR    90 tablet    Take 1 tablet (80 mg) by mouth daily    Family history of ischemic heart disease, Hyperlipidemia LDL goal <130       budesonide-formoterol 160-4.5 MCG/ACT Inhaler    SYMBICORT    3 Inhaler    Inhale 2 puffs into the lungs 2 times daily    Uncomplicated asthma, unspecified asthma severity       cetirizine HCl 10 MG Caps     90 capsule    Take 1 capsule by mouth daily    Chronic rhinitis       chlorhexidine 4 % liquid    HIBICLENS    946 mL    Wash ab, groin, thighs daily in shower DO NOT PUT ON FACE    Hidradenitis suppurativa       clindamycin-benzoyl peroxide gel    BENZACLIN    100 g    Apply topically 2 times daily    Hidradenitis suppurativa       desonide 0.05 % cream    DESOWEN    60 g    Apply sparingly to affected area three times daily as needed.    Localized pruritus       diazepam 10 MG tablet    VALIUM    90 tablet    Take 1 tablet (10 mg) by mouth every 8 hours as needed for anxiety or sleep For sleep, anxiety, and muscle tension.    Chest wall pain, Muscle pain, Anxiety       doxepin 10 MG capsule    SINEquan    30 capsule    Take 1 capsule (10 mg) by mouth At  Bedtime    Persistent insomnia       escitalopram 20 MG tablet    LEXAPRO    90 tablet    Take 1 tablet (20 mg) by mouth daily Increased dose.    Anxiety       estradiol 0.1 MG/GM cream    ESTRACE VAGINAL    42.5 g    1 gm pv nightly at bedtime for 2 weeks, then twice a week at bedtime for maintenance.    Atrophic vaginitis       fluticasone 50 MCG/ACT spray    FLONASE    16 g    Spray 2 sprays into both nostrils daily    Severe chronic obstructive pulmonary disease (H)       furosemide 20 MG tablet    LASIX    90 tablet    Take 1 tablet (20 mg) by mouth daily as needed    Bilateral leg edema       hydrOXYzine 50 MG tablet    ATARAX    120 tablet    Take 1 tablet (50 mg) by mouth 4 times daily as needed for anxiety Increased dose. For anxiety    Anxiety       ipratropium - albuterol 0.5 mg/2.5 mg/3 mL 0.5-2.5 (3) MG/3ML neb solution    DUONEB    360 mL    Take 1 vial (3 mLs) by nebulization every 6 hours    Uncomplicated asthma, unspecified asthma severity       irbesartan 300 MG tablet    AVAPRO    90 tablet    Take 1 tablet (300 mg) by mouth daily    Essential hypertension with goal blood pressure less than 140/90       ketoconazole 2 % shampoo    NIZORAL    120 mL    Apply to the affected area and wash off after 5 minutes.    Localized pruritus       lisinopril 20 MG tablet    PRINIVIL/ZESTRIL    90 tablet    Take 1 tablet (20 mg) by mouth daily    Essential hypertension with goal blood pressure less than 140/90       montelukast 10 MG tablet    SINGULAIR    30 tablet    Take 1 tablet (10 mg) by mouth At Bedtime    Dust allergy, Severe chronic obstructive pulmonary disease (H)       NICORETTE 2 MG lozenge   Generic drug:  nicotine polacrilex      PLACE 1 LOZENGE INSIDE CHEEK Q HOUR PRF SMOKING CESSATION UTD        omeprazole 20 MG CR capsule    priLOSEC    30 capsule    Take 1 capsule (20 mg) by mouth 2 times daily    Acute gastritis, presence of bleeding unspecified, unspecified gastritis type       ondansetron  8 MG tablet    ZOFRAN    30 tablet    Take 0.5-1 tablets (4-8 mg) by mouth every 8 hours as needed for nausea    Nausea       order for DME     1 Device    Equipment being ordered: Nebulizer machine Length of need-lifetime    Chronic lung disease, Cough, Severe persistent asthma with acute exacerbation       oxyCODONE 10 MG IR tablet    ROXICODONE    150 tablet    5 times daily as needed    Chest wall pain, Severe chronic obstructive pulmonary disease (H), Continuous opioid dependence (H)       prochlorperazine 5 MG tablet    COMPAZINE    90 tablet    Take 1 tablet (5 mg) by mouth every 6 hours as needed for nausea or vomiting    Nausea       ranitidine 75 MG tablet    ZANTAC    90 tablet    Take 2 tablets (150 mg) by mouth daily    Uncomplicated asthma, unspecified asthma severity       tiotropium 18 MCG capsule    SPIRIVA HANDIHALER    30 capsule    Inhale contents of one capsule daily.    Other emphysema (H)       varenicline 0.5 MG X 11 & 1 MG X 42 tablet    CHANTIX STARTING MONTH CHAPITO    53 tablet    Take 0.5 mg tab daily for 3 days, then 0.5 mg tab twice daily for 4 days, then 1 mg twice daily.    Encounter for smoking cessation counseling       venlafaxine 37.5 MG 24 hr capsule    EFFEXOR-XR    46 capsule    Take 1 capsule daily for 14 days, then take 2 capsules daily.    Menopausal syndrome (hot flashes)       VITAMIN D (CHOLECALCIFEROL) PO      Take 2,000 Units by mouth daily

## 2017-09-25 NOTE — PROGRESS NOTES
She has intense epigastric pain.  She wants to go to emergency room.  She hasn't been able to keep down any fluids.  She is here to get her Norco and diazepam refills.    She looks in distress because of the pain.  I think she is right, she needs to go to emergency room at least for IV fluids

## 2017-09-26 ENCOUNTER — HOSPITAL ENCOUNTER (EMERGENCY)
Facility: CLINIC | Age: 47
Discharge: HOME | End: 2017-09-26
Attending: EMERGENCY MEDICINE | Admitting: EMERGENCY MEDICINE
Payer: MEDICARE

## 2017-09-26 ENCOUNTER — APPOINTMENT (OUTPATIENT)
Dept: ULTRASOUND IMAGING | Facility: CLINIC | Age: 47
End: 2017-09-26
Attending: EMERGENCY MEDICINE
Payer: MEDICARE

## 2017-09-26 VITALS
RESPIRATION RATE: 22 BRPM | TEMPERATURE: 98.2 F | HEART RATE: 78 BPM | SYSTOLIC BLOOD PRESSURE: 112 MMHG | DIASTOLIC BLOOD PRESSURE: 62 MMHG | BODY MASS INDEX: 34.67 KG/M2 | WEIGHT: 202 LBS | OXYGEN SATURATION: 93 %

## 2017-09-26 DIAGNOSIS — K85.80 OTHER ACUTE PANCREATITIS, UNSPECIFIED COMPLICATION STATUS: ICD-10-CM

## 2017-09-26 LAB
ALBUMIN SERPL-MCNC: 3.6 G/DL (ref 3.4–5)
ALBUMIN UR-MCNC: NEGATIVE MG/DL
ALP SERPL-CCNC: 136 U/L (ref 40–150)
ALT SERPL W P-5'-P-CCNC: 36 U/L (ref 0–50)
ANION GAP SERPL CALCULATED.3IONS-SCNC: 8 MMOL/L (ref 3–14)
APPEARANCE UR: CLEAR
AST SERPL W P-5'-P-CCNC: 18 U/L (ref 0–45)
BASOPHILS # BLD AUTO: 0 10E9/L (ref 0–0.2)
BASOPHILS NFR BLD AUTO: 0.4 %
BILIRUB SERPL-MCNC: 0.3 MG/DL (ref 0.2–1.3)
BILIRUB UR QL STRIP: NEGATIVE
BUN SERPL-MCNC: 12 MG/DL (ref 7–30)
CALCIUM SERPL-MCNC: 9 MG/DL (ref 8.5–10.1)
CHLORIDE SERPL-SCNC: 107 MMOL/L (ref 94–109)
CO2 SERPL-SCNC: 24 MMOL/L (ref 20–32)
COLOR UR AUTO: NORMAL
CREAT SERPL-MCNC: 0.72 MG/DL (ref 0.52–1.04)
DIFFERENTIAL METHOD BLD: NORMAL
EOSINOPHIL # BLD AUTO: 0 10E9/L (ref 0–0.7)
EOSINOPHIL NFR BLD AUTO: 0 %
ERYTHROCYTE [DISTWIDTH] IN BLOOD BY AUTOMATED COUNT: 13.5 % (ref 10–15)
GFR SERPL CREATININE-BSD FRML MDRD: 86 ML/MIN/1.7M2
GLUCOSE SERPL-MCNC: 85 MG/DL (ref 70–99)
GLUCOSE UR STRIP-MCNC: NEGATIVE MG/DL
HCT VFR BLD AUTO: 43.5 % (ref 35–47)
HGB BLD-MCNC: 14.2 G/DL (ref 11.7–15.7)
HGB UR QL STRIP: NEGATIVE
IMM GRANULOCYTES # BLD: 0.1 10E9/L (ref 0–0.4)
IMM GRANULOCYTES NFR BLD: 0.6 %
INTERPRETATION ECG - MUSE: NORMAL
KETONES UR STRIP-MCNC: NEGATIVE MG/DL
LEUKOCYTE ESTERASE UR QL STRIP: NEGATIVE
LIPASE SERPL-CCNC: 442 U/L (ref 73–393)
LYMPHOCYTES # BLD AUTO: 2.8 10E9/L (ref 0.8–5.3)
LYMPHOCYTES NFR BLD AUTO: 29.5 %
MCH RBC QN AUTO: 30 PG (ref 26.5–33)
MCHC RBC AUTO-ENTMCNC: 32.6 G/DL (ref 31.5–36.5)
MCV RBC AUTO: 92 FL (ref 78–100)
MONOCYTES # BLD AUTO: 0.6 10E9/L (ref 0–1.3)
MONOCYTES NFR BLD AUTO: 6.6 %
NEUTROPHILS # BLD AUTO: 5.9 10E9/L (ref 1.6–8.3)
NEUTROPHILS NFR BLD AUTO: 62.9 %
NITRATE UR QL: NEGATIVE
NRBC # BLD AUTO: 0 10*3/UL
NRBC BLD AUTO-RTO: 0 /100
PH UR STRIP: 5 PH (ref 5–7)
PLATELET # BLD AUTO: 362 10E9/L (ref 150–450)
POTASSIUM SERPL-SCNC: 4.1 MMOL/L (ref 3.4–5.3)
PROT SERPL-MCNC: 7.5 G/DL (ref 6.8–8.8)
RBC # BLD AUTO: 4.74 10E12/L (ref 3.8–5.2)
RBC #/AREA URNS AUTO: 1 /HPF (ref 0–2)
SODIUM SERPL-SCNC: 139 MMOL/L (ref 133–144)
SOURCE: NORMAL
SP GR UR STRIP: 1.01 (ref 1–1.03)
UROBILINOGEN UR STRIP-MCNC: 0 MG/DL (ref 0–2)
WBC # BLD AUTO: 9.4 10E9/L (ref 4–11)
WBC #/AREA URNS AUTO: 1 /HPF (ref 0–2)

## 2017-09-26 PROCEDURE — 81001 URINALYSIS AUTO W/SCOPE: CPT | Performed by: EMERGENCY MEDICINE

## 2017-09-26 PROCEDURE — 99285 EMERGENCY DEPT VISIT HI MDM: CPT | Mod: 25

## 2017-09-26 PROCEDURE — 96374 THER/PROPH/DIAG INJ IV PUSH: CPT

## 2017-09-26 PROCEDURE — 80053 COMPREHEN METABOLIC PANEL: CPT | Performed by: EMERGENCY MEDICINE

## 2017-09-26 PROCEDURE — 25000128 H RX IP 250 OP 636: Performed by: EMERGENCY MEDICINE

## 2017-09-26 PROCEDURE — 85025 COMPLETE CBC W/AUTO DIFF WBC: CPT | Performed by: EMERGENCY MEDICINE

## 2017-09-26 PROCEDURE — 96361 HYDRATE IV INFUSION ADD-ON: CPT

## 2017-09-26 PROCEDURE — 76705 ECHO EXAM OF ABDOMEN: CPT

## 2017-09-26 PROCEDURE — 25000132 ZZH RX MED GY IP 250 OP 250 PS 637: Mod: GY | Performed by: EMERGENCY MEDICINE

## 2017-09-26 PROCEDURE — 96376 TX/PRO/DX INJ SAME DRUG ADON: CPT

## 2017-09-26 PROCEDURE — 96375 TX/PRO/DX INJ NEW DRUG ADDON: CPT

## 2017-09-26 PROCEDURE — 83690 ASSAY OF LIPASE: CPT | Performed by: EMERGENCY MEDICINE

## 2017-09-26 PROCEDURE — A9270 NON-COVERED ITEM OR SERVICE: HCPCS | Mod: GY | Performed by: EMERGENCY MEDICINE

## 2017-09-26 RX ORDER — OXYCODONE HYDROCHLORIDE 5 MG/1
5 TABLET ORAL ONCE
Status: COMPLETED | OUTPATIENT
Start: 2017-09-26 | End: 2017-09-26

## 2017-09-26 RX ORDER — ONDANSETRON 2 MG/ML
4 INJECTION INTRAMUSCULAR; INTRAVENOUS ONCE
Status: COMPLETED | OUTPATIENT
Start: 2017-09-26 | End: 2017-09-26

## 2017-09-26 RX ORDER — HYDROMORPHONE HYDROCHLORIDE 1 MG/ML
0.5 INJECTION, SOLUTION INTRAMUSCULAR; INTRAVENOUS; SUBCUTANEOUS
Status: DISCONTINUED | OUTPATIENT
Start: 2017-09-26 | End: 2017-09-26 | Stop reason: HOSPADM

## 2017-09-26 RX ORDER — ONDANSETRON 2 MG/ML
4 INJECTION INTRAMUSCULAR; INTRAVENOUS EVERY 30 MIN PRN
Status: DISCONTINUED | OUTPATIENT
Start: 2017-09-26 | End: 2017-09-26 | Stop reason: HOSPADM

## 2017-09-26 RX ORDER — OXYCODONE HYDROCHLORIDE 5 MG/1
5 TABLET ORAL EVERY 6 HOURS PRN
Qty: 15 TABLET | Refills: 0 | Status: SHIPPED | OUTPATIENT
Start: 2017-09-26 | End: 2017-09-29

## 2017-09-26 RX ORDER — SODIUM CHLORIDE 9 MG/ML
1000 INJECTION, SOLUTION INTRAVENOUS CONTINUOUS
Status: DISCONTINUED | OUTPATIENT
Start: 2017-09-26 | End: 2017-09-26 | Stop reason: HOSPADM

## 2017-09-26 RX ORDER — ONDANSETRON 4 MG/1
4 TABLET, ORALLY DISINTEGRATING ORAL EVERY 8 HOURS PRN
Qty: 10 TABLET | Refills: 0 | Status: SHIPPED | OUTPATIENT
Start: 2017-09-26 | End: 2017-09-29

## 2017-09-26 RX ADMIN — HYDROMORPHONE HYDROCHLORIDE 0.5 MG: 1 INJECTION, SOLUTION INTRAMUSCULAR; INTRAVENOUS; SUBCUTANEOUS at 12:38

## 2017-09-26 RX ADMIN — SODIUM CHLORIDE 1000 ML: 9 INJECTION, SOLUTION INTRAVENOUS at 11:28

## 2017-09-26 RX ADMIN — HYDROMORPHONE HYDROCHLORIDE 1 MG: 1 INJECTION, SOLUTION INTRAMUSCULAR; INTRAVENOUS; SUBCUTANEOUS at 11:29

## 2017-09-26 RX ADMIN — ONDANSETRON 4 MG: 2 INJECTION INTRAMUSCULAR; INTRAVENOUS at 13:29

## 2017-09-26 RX ADMIN — ONDANSETRON 4 MG: 2 INJECTION INTRAMUSCULAR; INTRAVENOUS at 11:29

## 2017-09-26 RX ADMIN — OXYCODONE HYDROCHLORIDE 5 MG: 5 TABLET ORAL at 13:29

## 2017-09-26 ASSESSMENT — ENCOUNTER SYMPTOMS
FEVER: 0
APPETITE CHANGE: 1
ABDOMINAL PAIN: 1
VOMITING: 1
CHILLS: 0
DIARRHEA: 1
DIAPHORESIS: 1

## 2017-09-26 NOTE — DISCHARGE INSTRUCTIONS
Discharge Instructions for Acute Pancreatitis  You have been diagnosed with acute pancreatitis. Your pancreas is inflamed or swollen. The pancreas is an organ that makes digestive juices and hormones. Gallstones are a common cause of pancreatitis. These hard stones form in the gallbladder. The gallbladder shares a tube with the pancreas into the small intestine. If gallstones block this tube, fluid can t leave the pancreas. The fluid backs up and causes redness and swelling (inflammation). There are other causes of pancreatitis. Make sure you understand the cause of your pancreatitis. Then you can try to stop it from happening again.  Immediate home care    Find someone to drive you to appointments. Acute pancreatitis is a serious condition, and you should never drive if you are experiencing symptoms.    Stop drinking if your illness was caused by alcohol.    Ask your healthcare provider about alcohol abuse programs and support groups such as Alcoholics Anonymous.    Ask your provider about prescription medicines that can help you stop drinking.    Tell your provider about the alcohol withdrawal symptoms you have when you stop drinking. This is very important. You may need close medical supervision and special medicines when you stop drinking. This will depend on your alcohol withdrawal history.     Take your medicines exactly as directed. Don t skip doses.    Eat a low-fat diet. Ask your provider for menus and other diet information.    Learn to take your own pulse. Keep a record of your results. Ask your provider which readings mean that you need medical attention.  Ongoing care    Tell your provider about any medicines you are taking. Some medicines can cause this condition.    Before starting any new medicine, ask your provider if it will harm your pancreas. This includes any new over-the-counter medicines, vitamins, or herbal supplements.      Tell your provider if you lose weight without dieting.    Be aware  of symptoms that may mean your pancreatitis has come back. These symptoms include belly pain, nausea and vomiting, and fever.    Keep all follow-up appointments with your provider. Problems can often show up later.  Follow-up  Follow up with your healthcare provider, or as advised.  When to call your provider  Call your healthcare provider right away if you have any of the following:    Fever of 100.4 F (38.0 C) or higher, or as advised by your provider    Severe pain from your upper belly to your back    Nausea and vomiting    Feely dizzy or lightheaded    Yellowing of your skin or eyes (jaundice)    Bruises on your belly or back    Belly swelling and tenderness    Rapid pulse    Shallow, fast breathing   Date Last Reviewed: 8/1/2016 2000-2017 [employer]. [address] [place] [address], [place] [address]. All rights reserved. This information is not intended as a substitute for professional medical care. Always follow your healthcare professional's instructions.

## 2017-09-26 NOTE — ED NOTES
Pt provided with discharge paperwork and educated on recommended follow-up with PCP. Pt educated on how to take prescriptions for zofran and oxycodone. Pt voiced understanding and denied any questions at discharge.

## 2017-09-26 NOTE — ED NOTES
"Pt here with upper abdominal pain. Pt was here last week, sent for an endoscopy which was diagnosed with a hiatal hernia, plan is biopsy and ultrasound of gall bladder. Pt reports unable to keep fluids/food down. Pt reports \"lots of heartburn\".  "

## 2017-09-26 NOTE — ED AVS SNAPSHOT
[facility] Emergency Department    [address] [facility] [address]    Phone:  [phone]    Fax:  [phone]                                       Swetha Patterson   MRN: [id number]    Department:  [facility] Emergency Department   Date of Visit:  9/26/2017           Patient Information     Date Of Birth          1970        Your diagnoses for this visit were:     Other acute pancreatitis, unspecified complication status        You were seen by Sally Ledbetter MD.      Follow-up Information     Follow up with [facility] Emergency Department.    Specialty:  EMERGENCY MEDICINE    Why:  immediately , If symptoms worsen    Contact information:    [address] [facility] [address] [phone]        Follow up with Roro Chawla MD In 3 days.    Specialty:  Internal Medicine    Contact information:    [address] [facility] [address]  [phone]          Discharge Instructions         Discharge Instructions for Acute Pancreatitis  You have been diagnosed with acute pancreatitis. Your pancreas is inflamed or swollen. The pancreas is an organ that makes digestive juices and hormones. Gallstones are a common cause of pancreatitis. These hard stones form in the gallbladder. The gallbladder shares a tube with the pancreas into the small intestine. If gallstones block this tube, fluid can t leave the pancreas. The fluid backs up and causes redness and swelling (inflammation). There are other causes of pancreatitis. Make sure you understand the cause of your pancreatitis. Then you can try to stop it from happening again.  Immediate home care    Find someone to drive you to appointments. Acute pancreatitis is a serious condition, and you should never drive if you are experiencing symptoms.    Stop drinking if your illness was caused by alcohol.    Ask your healthcare provider about alcohol abuse programs and  support groups such as Alcoholics Anonymous.    Ask your provider about prescription medicines that can help you stop drinking.    Tell your provider about the alcohol withdrawal symptoms you have when you stop drinking. This is very important. You may need close medical supervision and special medicines when you stop drinking. This will depend on your alcohol withdrawal history.     Take your medicines exactly as directed. Don t skip doses.    Eat a low-fat diet. Ask your provider for menus and other diet information.    Learn to take your own pulse. Keep a record of your results. Ask your provider which readings mean that you need medical attention.  Ongoing care    Tell your provider about any medicines you are taking. Some medicines can cause this condition.    Before starting any new medicine, ask your provider if it will harm your pancreas. This includes any new over-the-counter medicines, vitamins, or herbal supplements.      Tell your provider if you lose weight without dieting.    Be aware of symptoms that may mean your pancreatitis has come back. These symptoms include belly pain, nausea and vomiting, and fever.    Keep all follow-up appointments with your provider. Problems can often show up later.  Follow-up  Follow up with your healthcare provider, or as advised.  When to call your provider  Call your healthcare provider right away if you have any of the following:    Fever of 100.4 F (38.0 C) or higher, or as advised by your provider    Severe pain from your upper belly to your back    Nausea and vomiting    Feely dizzy or lightheaded    Yellowing of your skin or eyes (jaundice)    Bruises on your belly or back    Belly swelling and tenderness    Rapid pulse    Shallow, fast breathing   Date Last Reviewed: 8/1/2016 2000-2017 [employer]. [address] [place] [address], [place] [address]. All rights reserved. This information is not intended as a substitute for professional medical care. Always  follow your healthcare professional's instructions.          Future Appointments        Provider Department Dept Phone Center    9/28/2017 1:45 PM Milena Joseph NP [facility] [phone] [place]    10/25/2017 12:40 PM [facility] IMAGING CENTER CT ROOM 1 [facility] Imaging Center [phone] [facility]    10/25/2017 1:00 PM Pulmonary Function Lab [facility] Pulmonary Function Testing [phone] [facility]    10/25/2017 2:00 PM Nita Williamson MD [facility] [phone] [facility]    11/9/2017 1:00 PM Agustin Palomares MD [facility] Orthopaedic [facility] [phone] [facility]      24 Hour Appointment Hotline       To make an appointment at any [facility], call [phone] ([phone]). If you don't have a family doctor or clinic, we will help you find one. [facility] are conveniently located to serve the needs of you and your family.             Review of your medicines      START taking        Dose / Directions Last dose taken    ondansetron 4 MG ODT tab   Commonly known as:  ZOFRAN ODT   Dose:  4 mg   Quantity:  10 tablet        Take 1 tablet (4 mg) by mouth every 8 hours as needed for nausea   Refills:  0          CONTINUE these medicines which may have CHANGED, or have new prescriptions. If we are uncertain of the size of tablets/capsules you have at home, strength may be listed as something that might have changed.        Dose / Directions Last dose taken    oxyCODONE 5 MG IR tablet   Commonly known as:  ROXICODONE   Dose:  5 mg   What changed:    - medication strength  - how much to take  - how to take this  - when to take this  - reasons to take this  - additional instructions   Quantity:  15 tablet        Take 1 tablet (5 mg) by mouth every 6 hours as needed for pain   Refills:  0          Our records show that you are taking the medicines listed below. If these are incorrect, please call your family doctor or clinic.        Dose / Directions Last dose taken     albuterol 108 (90 BASE) MCG/ACT Inhaler   Commonly known as:  VENTOLIN HFA   Dose:  2 puff   Quantity:  3 Inhaler        Inhale 2 puffs into the lungs every 6 hours   Refills:  3        amLODIPine 10 MG tablet   Commonly known as:  NORVASC   Dose:  10 mg   Quantity:  90 tablet        Take 1 tablet (10 mg) by mouth daily   Refills:  1        atorvastatin 80 MG tablet   Commonly known as:  LIPITOR   Dose:  80 mg   Quantity:  90 tablet        Take 1 tablet (80 mg) by mouth daily   Refills:  1        budesonide-formoterol 160-4.5 MCG/ACT Inhaler   Commonly known as:  SYMBICORT   Dose:  2 puff   Quantity:  3 Inhaler        Inhale 2 puffs into the lungs 2 times daily   Refills:  3        cetirizine HCl 10 MG Caps   Dose:  1 capsule   Quantity:  90 capsule        Take 1 capsule by mouth daily   Refills:  3        chlorhexidine 4 % liquid   Commonly known as:  HIBICLENS   Quantity:  946 mL        Wash ab, groin, thighs daily in shower DO NOT PUT ON FACE   Refills:  3        clindamycin-benzoyl peroxide gel   Commonly known as:  BENZACLIN   Quantity:  100 g        Apply topically 2 times daily   Refills:  3        desonide 0.05 % cream   Commonly known as:  DESOWEN   Quantity:  60 g        Apply sparingly to affected area three times daily as needed.   Refills:  0        diazepam 10 MG tablet   Commonly known as:  VALIUM   Dose:  10 mg   Quantity:  90 tablet        Take 1 tablet (10 mg) by mouth every 8 hours as needed for anxiety or sleep For sleep, anxiety, and muscle tension.   Refills:  0        doxepin 10 MG capsule   Commonly known as:  SINEquan   Dose:  10 mg   Quantity:  30 capsule        Take 1 capsule (10 mg) by mouth At Bedtime   Refills:  1        escitalopram 20 MG tablet   Commonly known as:  LEXAPRO   Dose:  20 mg   Quantity:  90 tablet        Take 1 tablet (20 mg) by mouth daily Increased dose.   Refills:  1        estradiol 0.1 MG/GM cream   Commonly known as:  ESTRACE VAGINAL   Quantity:  42.5 g        1 gm  pv nightly at bedtime for 2 weeks, then twice a week at bedtime for maintenance.   Refills:  6        fluticasone 50 MCG/ACT spray   Commonly known as:  FLONASE   Dose:  2 spray   Quantity:  16 g        Spray 2 sprays into both nostrils daily   Refills:  11        furosemide 20 MG tablet   Commonly known as:  LASIX   Dose:  20 mg   Quantity:  90 tablet        Take 1 tablet (20 mg) by mouth daily as needed   Refills:  1        hydrOXYzine 50 MG tablet   Commonly known as:  ATARAX   Dose:  50 mg   Quantity:  120 tablet        Take 1 tablet (50 mg) by mouth 4 times daily as needed for anxiety Increased dose. For anxiety   Refills:  0        ipratropium - albuterol 0.5 mg/2.5 mg/3 mL 0.5-2.5 (3) MG/3ML neb solution   Commonly known as:  DUONEB   Dose:  1 vial   Quantity:  360 mL        Take 1 vial (3 mLs) by nebulization every 6 hours   Refills:  0        irbesartan 300 MG tablet   Commonly known as:  AVAPRO   Dose:  300 mg   Quantity:  90 tablet        Take 1 tablet (300 mg) by mouth daily   Refills:  1        ketoconazole 2 % shampoo   Commonly known as:  NIZORAL   Quantity:  120 mL        Apply to the affected area and wash off after 5 minutes.   Refills:  1        lisinopril 20 MG tablet   Commonly known as:  PRINIVIL/ZESTRIL   Dose:  20 mg   Quantity:  90 tablet        Take 1 tablet (20 mg) by mouth daily   Refills:  1        montelukast 10 MG tablet   Commonly known as:  SINGULAIR   Dose:  10 mg   Quantity:  30 tablet        Take 1 tablet (10 mg) by mouth At Bedtime   Refills:  1        NICORETTE 2 MG lozenge   Generic drug:  nicotine polacrilex        PLACE 1 LOZENGE INSIDE CHEEK Q HOUR PRF SMOKING CESSATION UTD   Refills:  1        omeprazole 20 MG CR capsule   Commonly known as:  priLOSEC   Dose:  20 mg   Quantity:  30 capsule        Take 1 capsule (20 mg) by mouth 2 times daily   Refills:  0        ondansetron 8 MG tablet   Commonly known as:  ZOFRAN   Dose:  4-8 mg   Quantity:  30 tablet        Take 0.5-1  tablets (4-8 mg) by mouth every 8 hours as needed for nausea   Refills:  0        order for DME   Quantity:  1 Device        Equipment being ordered: Nebulizer machine Length of need-lifetime   Refills:  0        prochlorperazine 5 MG tablet   Commonly known as:  COMPAZINE   Dose:  5 mg   Quantity:  90 tablet        Take 1 tablet (5 mg) by mouth every 6 hours as needed for nausea or vomiting   Refills:  0        ranitidine 75 MG tablet   Commonly known as:  ZANTAC   Dose:  150 mg   Quantity:  90 tablet        Take 2 tablets (150 mg) by mouth daily   Refills:  1        tiotropium 18 MCG capsule   Commonly known as:  SPIRIVA HANDIHALER   Quantity:  30 capsule        Inhale contents of one capsule daily.   Refills:  1        varenicline 0.5 MG X 11 & 1 MG X 42 tablet   Commonly known as:  CHANTIX STARTING MONTH CHAPITO   Quantity:  53 tablet        Take 0.5 mg tab daily for 3 days, then 0.5 mg tab twice daily for 4 days, then 1 mg twice daily.   Refills:  0        venlafaxine 37.5 MG 24 hr capsule   Commonly known as:  EFFEXOR-XR   Quantity:  46 capsule        Take 1 capsule daily for 14 days, then take 2 capsules daily.   Refills:  0        VITAMIN D (CHOLECALCIFEROL) PO   Dose:  2000 Units        Take 2,000 Units by mouth daily   Refills:  0                Prescriptions were sent or printed at these locations (2 Prescriptions)                   Other Prescriptions                Printed at Department/Unit printer (2 of 2)         oxyCODONE (ROXICODONE) 5 MG IR tablet               ondansetron (ZOFRAN ODT) 4 MG ODT tab                Procedures and tests performed during your visit     CBC with platelets differential    Comprehensive metabolic panel    EKG 12-lead, tracing only    Lipase    Peripheral IV: Standard    UA with Microscopic    US Abdomen Limited      Orders Needing Specimen Collection     None      Pending Results     Date and Time Order Name Status Description    9/26/2017 1306 UA with Microscopic In process      9/26/2017 1114 US Abdomen Limited Preliminary             Pending Culture Results     Date and Time Order Name Status Description    9/26/2017 1306 UA with Microscopic In process             Pending Results Instructions     If you had any lab results that were not finalized at the time of your Discharge, you can call the ED Lab Result RN at [phone]. You will be contacted by this team for any positive Lab results or changes in treatment. The nurses are available 7 days a week from 10A to 6:30P.  You can leave a message 24 hours per day and they will return your call.        Test Results From Your Hospital Stay        9/26/2017 12:02 PM      Component Results     Component Value Ref Range & Units Status    WBC 9.4 4.0 - 11.0 10e9/L Final    RBC Count 4.74 3.8 - 5.2 10e12/L Final    Hemoglobin 14.2 11.7 - 15.7 g/dL Final    Hematocrit 43.5 35.0 - 47.0 % Final    MCV 92 78 - 100 fl Final    MCH 30.0 26.5 - 33.0 pg Final    MCHC 32.6 31.5 - 36.5 g/dL Final    RDW 13.5 10.0 - 15.0 % Final    Platelet Count 362 150 - 450 10e9/L Final    Diff Method Automated Method  Final    % Neutrophils 62.9 % Final    % Lymphocytes 29.5 % Final    % Monocytes 6.6 % Final    % Eosinophils 0.0 % Final    % Basophils 0.4 % Final    % Immature Granulocytes 0.6 % Final    Nucleated RBCs 0 0 /100 Final    Absolute Neutrophil 5.9 1.6 - 8.3 10e9/L Final    Absolute Lymphocytes 2.8 0.8 - 5.3 10e9/L Final    Absolute Monocytes 0.6 0.0 - 1.3 10e9/L Final    Absolute Eosinophils 0.0 0.0 - 0.7 10e9/L Final    Absolute Basophils 0.0 0.0 - 0.2 10e9/L Final    Abs Immature Granulocytes 0.1 0 - 0.4 10e9/L Final    Absolute Nucleated RBC 0.0  Final         9/26/2017 12:26 PM      Component Results     Component Value Ref Range & Units Status    Sodium 139 133 - 144 mmol/L Final    Potassium 4.1 3.4 - 5.3 mmol/L Final    Chloride 107 94 - 109 mmol/L Final    Carbon Dioxide 24 20 - 32 mmol/L Final    Anion Gap 8 3 - 14 mmol/L Final    Glucose 85  70 - 99 mg/dL Final    Urea Nitrogen 12 7 - 30 mg/dL Final    Creatinine 0.72 0.52 - 1.04 mg/dL Final    GFR Estimate 86 >60 mL/min/1.7m2 Final    Non  GFR Calc    GFR Estimate If Black >90 >60 mL/min/1.7m2 Final    African American GFR Calc    Calcium 9.0 8.5 - 10.1 mg/dL Final    Bilirubin Total 0.3 0.2 - 1.3 mg/dL Final    Albumin 3.6 3.4 - 5.0 g/dL Final    Protein Total 7.5 6.8 - 8.8 g/dL Final    Alkaline Phosphatase 136 40 - 150 U/L Final    ALT 36 0 - 50 U/L Final    AST 18 0 - 45 U/L Final         9/26/2017 12:26 PM      Component Results     Component Value Ref Range & Units Status    Lipase 442 (H) 73 - 393 U/L Final         9/26/2017 12:11 PM      Narrative     ULTRASOUND ABDOMEN LIMITED September 26, 2017 12:05 PM     HISTORY: Right upper quadrant pain.    COMPARISON: None.    FINDINGS: Liver is unremarkable in echogenicity without focal lesions  where visualized, portions were not well seen due to body habitus.  Gallbladder is normal without stones or sludge. Extrahepatic bile duct  is normal in diameter. Pancreas is normal where visualized. Right  kidney is normal in size. There is no hydronephrosis. Proximal aorta  and IVC are nonaneurysmal.        Impression     IMPRESSION: Negative abdominal ultrasound.         9/26/2017  1:58 PM                Clinical Quality Measure: Blood Pressure Screening     Your blood pressure was checked while you were in the emergency department today. The last reading we obtained was  BP: 112/62 . Please read the guidelines below about what these numbers mean and what you should do about them.  If your systolic blood pressure (the top number) is less than 120 and your diastolic blood pressure (the bottom number) is less than 80, then your blood pressure is normal. There is nothing more that you need to do about it.  If your systolic blood pressure (the top number) is 120-139 or your diastolic blood pressure (the bottom number) is 80-89, your blood  "pressure may be higher than it should be. You should have your blood pressure rechecked within a year by a primary care provider.  If your systolic blood pressure (the top number) is 140 or greater or your diastolic blood pressure (the bottom number) is 90 or greater, you may have high blood pressure. High blood pressure is treatable, but if left untreated over time it can put you at risk for heart attack, stroke, or kidney failure. You should have your blood pressure rechecked by a primary care provider within the next 4 weeks.  If your provider in the emergency department today gave you specific instructions to follow-up with your doctor or provider even sooner than that, you should follow that instruction and not wait for up to 4 weeks for your follow-up visit.        Thank you for choosing [facility]       Thank you for choosing [facility] for your care. Our goal is always to provide you with excellent care. Hearing back from our patients is one way we can continue to improve our services. Please take a few minutes to complete the written survey that you may receive in the mail after you visit with us. Thank you!        [facility] Information     [facility] lets you send messages to your doctor, view your test results, renew your prescriptions, schedule appointments and more. To sign up, go to [email or web address] . Click on \"Log in\" on the left side of the screen, which will take you to the Welcome page. Then click on \"Sign up Now\" on the right side of the page.     You will be asked to enter the access code listed below, as well as some personal information. Please follow the directions to create your username and password.     Your access code is: [email or web address]  Expires: 10/1/2017  7:07 PM     Your access code will  in 90 days. If you need help or a new code, please call your [facility] clinic or [phone].        Care EveryWhere ID     This is your Care EveryWhere ID. This could be used by other organizations " to access your [facility] medical records  [id number]        Equal Access to Services     CHARITY MANDUJANO : Ramon Jin, amanda kendrick, caryl bruce. So [facility] [phone].    ATENCIÓN: Si habla español, tiene a vargas disposición servicios gratuitos de asistencia lingüística. Llame al [phone].    We comply with applicable federal civil rights laws and [place] laws. We do not discriminate on the basis of race, color, national origin, age, disability sex, sexual orientation or gender identity.            After Visit Summary       This is your record. Keep this with you and show to your community pharmacist(s) and doctor(s) at your next visit.

## 2017-09-26 NOTE — ED AVS SNAPSHOT
[facility] Emergency Department    [address]    [facility] [address]    Phone:  [phone]    Fax:  [phone]                                       Swetha Patterson   MRN: [id number]    Department:  [facility] Emergency Department   Date of Visit:  9/26/2017           After Visit Summary Signature Page     I have received my discharge instructions, and my questions have been answered. I have discussed any challenges I see with this plan with the nurse or doctor.    ..........................................................................................................................................  Patient/Patient Representative Signature      ..........................................................................................................................................  Patient Representative Print Name and Relationship to Patient    ..................................................               ................................................  Date                                            Time    ..........................................................................................................................................  Reviewed by Signature/Title    ...................................................              ..............................................  Date                                                            Time

## 2017-09-26 NOTE — ED PROVIDER NOTES
"  History     Chief Complaint:  Abdominal Pain    HPI   Swetha Patterson is a 47 year old female, with a long medical history as noted above and recently diagnosed with a hiatal hernia after an endoscopy, who presents alone to the emergency department for evaluation of abdominal pain. The patient reports that she was seen here last week on 9/19 for similar complaints, recommended to follow up with endoscopy where they discovered a hiatal hernia. She reports she presents today due to increasing pain and the lack of relief of her current intervention such as Oxycodone and Omeprazole. She notes she was supposed to wait 7-10 days for results of her biopsy but notes she \"cannot wait that long\". The patient reports she has been unable to eat or drink anything as it \"gets stuck\" and causes the patient to vomit. She also reports she is experiencing associated diaphoresis and diarrhea. She denies any fever, chills or black stools. To note, she also was seen at her PCP yesterday as well.     Impression of Endoscopy Performed 9/22/17:  IMPRESSION:  Hiatal hernia  Esophagitis  MD Impression Comments: Await biopsies.The hiatal hernia is likely contributing to reflux. Please continue the Omeprazole 20 mg. I would recommend 20 mg taken 30 minutes prior to eating breakfast and then 20 mg 30 minutes before dinner. If the dinner dose is too hard to remember you can take 40 mg in the morning 30 minutes prior to breakfast just once a day. I do not think you need it three times a day, probably not much of a benefit over twice a day.  Performed at [facility]    Allergies:  Codeine  Hydrocodone  Sulfa Drugs  Gabapentin     Medications:    Oxycodone  Valium  Zofran  Prilosec  Atarax  Varenicline  Lexapro  Sinequan  Effexor-xr  Estradiol  Flonase  Zantac  Cetirizine  Atorvastatin  Amlodipine  Lisinopril  Avapro  Montelukast  Lasix  Nicorette  Symbicort  Duoneb  Spiriva " handihaler  Albuterol  Nizoral  Desonide  Compazine  Benzaclin  Chlorhexidine  Vitamin D, Cholecalciferol     Past Medical History:    Anxiety state  Arthritis  Asthma  Chronic pain  Contact dermatitis and other eczema, due to unspecified cause  COPD  Depressive disorder  Emphysema with chronic bronchitis  Esophageal reflux  Family history of ischemic heart disease  GERD  Hoarseness  Hyperlipidemia  Pneumonia  Polyp of vocal cord or larynx  PONV    Past Surgical History:    Arthroscopy shoulder decompression  Bronchial thermoplasty x3  Appendectomy  Excise node mediastinal  Thoracoscopy  Tonsillectomy    Family History:    MI  Heart murmur  Heart disease  Hypertension  Cerebrovascular disease  Depression  Gallbladder disease  Asthma  Heart disease - hole in heart    Social History:  The patient was accompanied to the ED alone.  Smoking Status: Yes  Smokeless Tobacco: No  Alcohol Use: No   Marital Status:  Single [1]     Review of Systems   Constitutional: Positive for appetite change (Decreased appetite secondary to vomiting) and diaphoresis. Negative for chills and fever.   Gastrointestinal: Positive for abdominal pain, diarrhea and vomiting.   All other systems reviewed and are negative.    Physical Exam   Vitals:  Patient Vitals for the past 24 hrs:   BP Temp Temp src Pulse Resp SpO2 Weight   09/26/17 1345 112/62 - - - - 93 % -   09/26/17 1330 (!) 128/95 - - - - 96 % -   09/26/17 1300 114/83 - - - - 98 % -   09/26/17 1245 115/53 - - - - 97 % -   09/26/17 1230 130/85 - - - - 97 % -   09/26/17 1136 - - - - - 97 % -   09/26/17 1134 124/77 - - - - - -   09/26/17 1034 (!) 156/107 98.2  F (36.8  C) Temporal 78 22 99 % 91.6 kg (202 lb)       Physical Exam  Gen: Pleasant, appears stated age.  In pain.    Eye:   Pupils are equal, round, and reactive.     Sclera non-injected.    ENT:   Moist mucus membranes.     Normal tongue.    Oropharynx without lesions.    Cardiac:     Normal rate and regular rhythm.    No murmurs,  gallops, or rubs.    Pulmonary:     Clear to auscultation bilaterally.    No wheezes, rales, or rhonchi.    Abdomen:     Decreased bowel sounds   Positive ashley sign on the right  Musculoskeletal:     Normal movement of all extremities without evidence for deficit.    Extremities:    No edema.    Skin:   Warm and dry.    Neurologic:    Non-focal exam without asymmetric weakness or numbness.    Normal tone    Psychiatric:     Normal affect with appropriate interaction with examiner.       Emergency Department Course     ECG:  ECG taken at 1132, ECG read at 1226  Normal sinus rhythm  Normal ECG  Rate 72 bpm. WV interval 122. QRS duration 86. QT/QTc 424/464. P-R-T axes 32 57 51.     Imaging:  Radiology findings were communicated with the patient who voiced understanding of the findings.  US Abdomen Limited:   IMPRESSION: Negative abdominal ultrasound.  Reading per radiology.     Laboratory:  Laboratory findings were communicated with the patient who voiced understanding of the findings.  CBC: AWNL (WBC 9.4, HGB 14.2, )  CMP: AWNL (Creatinine 0.72)  Lipase: 442 (H)    UA with Microscopic: AWNL     Interventions:  1128 0.9% NaCl Bolus 1000 mL IV  1129 Zofran 4 mg IV  1129 Dilaudid 1 mg IV  1238 Dilaudid 0.5 mg IV  1329 Oxycodone 5 mg PO  1329 Zofran 4 mg IV     Emergency Department Course:  Nursing notes and vitals reviewed.  I performed an exam of the patient as documented above.   IV was inserted and blood was drawn for laboratory testing, results above.  The patient was sent for a US Abdomen Limited while in the emergency department, results above.   EKG obtained in the ED, see results above.     The patient provided a urine sample here in the emergency department. This was sent for laboratory testing, findings above.     1:15 PM: I reassessed the patient who reported mild improvement in pain, but notes it is still present.  1:50 PM: I reassessed the patient who tolerated PO challenge. Discussed admission, but  patient noted she would like to go home.     I discussed the treatment plan with the patient. They expressed understanding of this plan and consented to discharge. They will be discharged home with instructions for care and follow up. In addition, the patient will return to the emergency department if their symptoms persist, worsen, if new symptoms arise or if there is any concern.  All questions were answered.     I personally reviewed the laboratory results with the Patient and answered all related questions prior to discharge.    Impression & Plan      Medical Decision Making:  This is a 47 year old female with recent history of gastritis, who presents with severe epigastric abdominal pain. Her labs are consistent with mild pancreatitis. This makes sense with the location of her pain.  This has rarely been associated with endoscopy. Otherwise, there is no evidence for gallstones and she denies any alcohol use. She is not on medications typically associated with pancreatitis.  I do not suspect other dangerous process such as ruptured viscous, AAA, or ACS.  I've recommended rest, clear fluids and pain medications as needed. We discussed admission to the hospital, although at this point, she has a strong preference to return home. She is able to tolerate oral fluids, and feels that her pain is well controlled.  I've recommended follow up with PCP in 2-3 days for recheck of her symptoms. Otherwise, she will return for inability to tolerate oral fluids, fevers or chills, or for any other concerns.     Diagnosis:    ICD-10-CM    1. Other acute pancreatitis, unspecified complication status K85.80         Disposition:   Discharged.    Discharge Medications:  New Prescriptions    ONDANSETRON (ZOFRAN ODT) 4 MG ODT TAB    Take 1 tablet (4 mg) by mouth every 8 hours as needed for nausea    OXYCODONE (ROXICODONE) 5 MG IR TABLET    Take 1 tablet (5 mg) by mouth every 6 hours as needed for pain       Scribe Disclosure:  I,  Amparo Rios, am serving as a scribe at 11:09 AM on 9/26/2017 to document services personally performed by Sally Ledbetter MD, based on my observations and the provider's statements to me.  9/26/2017   [facility] EMERGENCY DEPARTMENT       Sally Ledbetter MD  09/26/17 1738

## 2017-09-26 NOTE — LETTER
To Whom it may concern:      Swetha Patterson was seen in our Emergency Department today, 09/26/17.  I expect her condition to improve over the next 3 days.  She may return to work when improved.    Sincerely,  Sally Ledbetter MD

## 2017-09-26 NOTE — ED NOTES
"Answered pt's call light after return from ultrasound. Pt states \"my abdomen hurts after them pressing on it\". Pt requesting something more for pain. MD notified.  "

## 2017-09-28 DIAGNOSIS — F41.9 ANXIETY: Primary | Chronic | ICD-10-CM

## 2017-09-29 ENCOUNTER — OFFICE VISIT (OUTPATIENT)
Dept: INTERNAL MEDICINE | Facility: CLINIC | Age: 47
End: 2017-09-29
Payer: MEDICARE

## 2017-09-29 VITALS
WEIGHT: 203.1 LBS | TEMPERATURE: 98.5 F | DIASTOLIC BLOOD PRESSURE: 74 MMHG | OXYGEN SATURATION: 97 % | SYSTOLIC BLOOD PRESSURE: 130 MMHG | BODY MASS INDEX: 34.67 KG/M2 | HEART RATE: 84 BPM | HEIGHT: 64 IN

## 2017-09-29 DIAGNOSIS — R10.13 ABDOMINAL PAIN, EPIGASTRIC: Primary | ICD-10-CM

## 2017-09-29 PROCEDURE — 99214 OFFICE O/P EST MOD 30 MIN: CPT | Performed by: INTERNAL MEDICINE

## 2017-09-29 RX ORDER — OXYCODONE HYDROCHLORIDE 5 MG/1
5 TABLET ORAL EVERY 6 HOURS PRN
Qty: 35 TABLET | Refills: 0 | Status: SHIPPED | OUTPATIENT
Start: 2017-09-29 | End: 2017-10-06

## 2017-09-29 NOTE — MR AVS SNAPSHOT
After Visit Summary   9/29/2017    Swetha Patterson    MRN: [id number]           Patient Information     Date Of Birth          1970        Visit Information        Provider Department      9/29/2017 3:00 PM Roro Chawla MD [facility]        Today's Diagnoses     Abdominal pain, epigastric    -  1       Follow-ups after your visit        Your next 10 appointments already scheduled     Oct 06, 2017 12:40 PM CDT   [facility] with Roro [facility]awla MD   [facility]    [address]  [facility] [address]   [phone]            Oct 25, 2017 12:40 PM CDT   (Arrive by 12:25 PM)   CT CHEST W/O CONTRAST with UCCT1   [facility] [place] [facility] ([facility])    [address] [place] [address] [phone]           Please bring any scans or X-rays taken at other hospitals, if similar tests were done. Also bring a list of your medicines, including vitamins, minerals and over-the-counter drugs. It is safest to leave personal items at home.  Be sure to tell your doctor:   If you have any allergies.   If there s any chance you are pregnant.   If you are breastfeeding.   If you have any special needs.  You do not need to do anything special to prepare.  Please wear loose clothing, such as a sweat suit or jogging clothes. Avoid snaps, zippers and other metal. We may ask you to undress and put on a hospital gown.            Oct 25, 2017  1:00 PM CDT   FULL PULMONARY FUNCTION with  PFL D   [facility] ([facility])    [address] [place] [address] [phone]            Oct 25, 2017  2:00 PM CDT   (Arrive by 1:45 PM)   Return Visit with Nita Williamson MD   [facility] ([facility])    [address] [place] [address]  "Floor  [facility] [address] [phone]            Oct 26, 2017 10:15 AM CDT   Return Visit with Milena Joseph NP   [facility]    [address]  [address]  [facility] [id number] [phone]            2017  1:00 PM CST   (Arrive by 12:30 PM)   NEW NECK with Agustin Palomares MD   [facility] ([facility])    [address]  4th Floor  [facility] [id number] [phone]              Who to contact     If you have questions or need follow up information about today's clinic visit or your schedule please contact [facility] directly at [phone].  Normal or non-critical lab and imaging results will be communicated to you by MyChart, letter or phone within 4 business days after the clinic has received the results. If you do not hear from us within 7 days, please contact the clinic through MyChart or phone. If you have a critical or abnormal lab result, we will notify you by phone as soon as possible.  Submit refill requests through [employer] or call your pharmacy and they will forward the refill request to us. Please allow 3 business days for your refill to be completed.          Additional Information About Your Visit        [email or web address] Information     [employer] lets you send messages to your doctor, view your test results, renew your prescriptions, schedule appointments and more. To sign up, go to [email or web address] . Click on \"Log in\" on the left side of the screen, which will take you to the Welcome page. Then click on \"Sign up Now\" on the right side of the page.     You will be asked to enter the access code listed below, as well as some personal information. Please follow the directions to create your username and password.     Your access code is: [email or web address]  Expires: 10/1/2017  7:07 PM     Your access code will  in 90 days. If you need help or a " "new code, please call your [place] clinic or [phone].        Care EveryWhere ID     This is your Care EveryWhere ID. This could be used by other organizations to access your [place] medical records  [id number]        Your Vitals Were     Pulse Temperature Height Last Period Pulse Oximetry Breastfeeding?    84 98.5  F (36.9  C) (Oral) 5' 4\" (1.626 m) 04/15/2016 97% No    BMI (Body Mass Index)                   34.86 kg/m2            Blood Pressure from Last 3 Encounters:   09/29/17 130/74   09/26/17 112/62   09/25/17 136/80    Weight from Last 3 Encounters:   09/29/17 203 lb 1.6 oz (92.1 kg)   09/26/17 202 lb (91.6 kg)   09/25/17 202 lb 11.2 oz (91.9 kg)              Today, you had the following     No orders found for display         Where to get your medicines      Some of these will need a paper prescription and others can be bought over the counter.  Ask your nurse if you have questions.     Bring a paper prescription for each of these medications     oxyCODONE 5 MG IR tablet          Primary Care Provider Office Phone # Fax #    Roro Janine Chawla [phone]       [address] [facility] [address]        Equal Access to Services     CHARITY MANDUJANO : Hadii skinny ku hadasho Soomaali, waaxda luqadaha, qaybta kaalmada adeegyada, caryl rodas haycliffordn susanne cowan . So [facility] [phone].    ATENCIÓN: Si habla español, tiene a vargas disposición servicios gratuitos de asistencia lingüística. Llame al [phone].    We comply with applicable federal civil rights laws and [place] laws. We do not discriminate on the basis of race, color, national origin, age, disability, sex, sexual orientation, or gender identity.            Thank you!     Thank you for choosing [facility]  for your care. Our goal is always to provide you with excellent care. Hearing back from our patients is one way we can continue to improve our services. Please take a few minutes to " complete the written survey that you may receive in the mail after your visit with us. Thank you!             Your Updated Medication List - Protect others around you: Learn how to safely use, store and throw away your medicines at www.disposemymeds.org.          This list is accurate as of: 9/29/17 11:59 PM.  Always use your most recent med list.                   Brand Name Dispense Instructions for use Diagnosis    albuterol 108 (90 BASE) MCG/ACT Inhaler    VENTOLIN HFA    3 Inhaler    Inhale 2 puffs into the lungs every 6 hours    Shortness of breath       amLODIPine 10 MG tablet    NORVASC    90 tablet    Take 1 tablet (10 mg) by mouth daily    HTN, goal below 140/90       atorvastatin 80 MG tablet    LIPITOR    90 tablet    Take 1 tablet (80 mg) by mouth daily    Family history of ischemic heart disease, Hyperlipidemia LDL goal <130       budesonide-formoterol 160-4.5 MCG/ACT Inhaler    SYMBICORT    3 Inhaler    Inhale 2 puffs into the lungs 2 times daily    Uncomplicated asthma, unspecified asthma severity       cetirizine HCl 10 MG Caps     90 capsule    Take 1 capsule by mouth daily    Chronic rhinitis       chlorhexidine 4 % liquid    HIBICLENS    946 mL    Wash ab, groin, thighs daily in shower DO NOT PUT ON FACE    Hidradenitis suppurativa       clindamycin-benzoyl peroxide gel    BENZACLIN    100 g    Apply topically 2 times daily    Hidradenitis suppurativa       desonide 0.05 % cream    DESOWEN    60 g    Apply sparingly to affected area three times daily as needed.    Localized pruritus       diazepam 10 MG tablet    VALIUM    90 tablet    Take 1 tablet (10 mg) by mouth every 8 hours as needed for anxiety or sleep For sleep, anxiety, and muscle tension.    Chest wall pain, Muscle pain, Anxiety       doxepin 10 MG capsule    SINEquan    30 capsule    Take 1 capsule (10 mg) by mouth At Bedtime    Persistent insomnia       escitalopram 20 MG tablet    LEXAPRO    90 tablet    Take 1 tablet (20 mg) by  mouth daily Increased dose.    Anxiety       estradiol 0.1 MG/GM cream    ESTRACE VAGINAL    42.5 g    1 gm pv nightly at bedtime for 2 weeks, then twice a week at bedtime for maintenance.    Atrophic vaginitis       fluticasone 50 MCG/ACT spray    FLONASE    16 g    Spray 2 sprays into both nostrils daily    Severe chronic obstructive pulmonary disease (H)       furosemide 20 MG tablet    LASIX    90 tablet    Take 1 tablet (20 mg) by mouth daily as needed    Bilateral leg edema       hydrOXYzine 50 MG tablet    ATARAX    120 tablet    Take 1 tablet (50 mg) by mouth 4 times daily as needed for anxiety Increased dose. For anxiety    Anxiety       ipratropium - albuterol 0.5 mg/2.5 mg/3 mL 0.5-2.5 (3) MG/3ML neb solution    DUONEB    360 mL    Take 1 vial (3 mLs) by nebulization every 6 hours    Uncomplicated asthma, unspecified asthma severity       irbesartan 300 MG tablet    AVAPRO    90 tablet    Take 1 tablet (300 mg) by mouth daily    Essential hypertension with goal blood pressure less than 140/90       ketoconazole 2 % shampoo    NIZORAL    120 mL    Apply to the affected area and wash off after 5 minutes.    Localized pruritus       lisinopril 20 MG tablet    PRINIVIL/ZESTRIL    90 tablet    Take 1 tablet (20 mg) by mouth daily    Essential hypertension with goal blood pressure less than 140/90       montelukast 10 MG tablet    SINGULAIR    30 tablet    Take 1 tablet (10 mg) by mouth At Bedtime    Dust allergy, Severe chronic obstructive pulmonary disease (H)       NICORETTE 2 MG lozenge   Generic drug:  nicotine polacrilex      PLACE 1 LOZENGE INSIDE CHEEK Q HOUR PRF SMOKING CESSATION UTD        omeprazole 20 MG CR capsule    priLOSEC    30 capsule    Take 1 capsule (20 mg) by mouth 2 times daily    Acute gastritis, presence of bleeding unspecified, unspecified gastritis type       ondansetron 4 MG ODT tab    ZOFRAN ODT    10 tablet    Take 1 tablet (4 mg) by mouth every 8 hours as needed for nausea         ondansetron 8 MG tablet    ZOFRAN    30 tablet    Take 0.5-1 tablets (4-8 mg) by mouth every 8 hours as needed for nausea    Nausea       order for DME     1 Device    Equipment being ordered: Nebulizer machine Length of need-lifetime    Chronic lung disease, Cough, Severe persistent asthma with acute exacerbation       oxyCODONE 5 MG IR tablet    ROXICODONE    35 tablet    Take 1 tablet (5 mg) by mouth every 6 hours as needed for pain    Abdominal pain, epigastric       prochlorperazine 5 MG tablet    COMPAZINE    90 tablet    Take 1 tablet (5 mg) by mouth every 6 hours as needed for nausea or vomiting    Nausea       ranitidine 75 MG tablet    ZANTAC    90 tablet    Take 2 tablets (150 mg) by mouth daily    Uncomplicated asthma, unspecified asthma severity       tiotropium 18 MCG capsule    SPIRIVA HANDIHALER    30 capsule    Inhale contents of one capsule daily.    Other emphysema (H)       tiZANidine 4 MG tablet    ZANAFLEX     TK 1 T PO  TID PRF MUSCLE SPASM        varenicline 0.5 MG X 11 & 1 MG X 42 tablet    CHANTIX STARTING MONTH CHAPIOT    53 tablet    Take 0.5 mg tab daily for 3 days, then 0.5 mg tab twice daily for 4 days, then 1 mg twice daily.    Encounter for smoking cessation counseling       venlafaxine 37.5 MG 24 hr capsule    EFFEXOR-XR    46 capsule    Take 1 capsule daily for 14 days, then take 2 capsules daily.    Menopausal syndrome (hot flashes)       VITAMIN D (CHOLECALCIFEROL) PO      Take 2,000 Units by mouth daily

## 2017-09-29 NOTE — PROGRESS NOTES
"Dr Low's note      Patient's instructions / PLAN:                                                        Plan:  1. Enema   2. Oxy 15 mg 5 times      ASSESSMENT & PLAN:                                                      (R10.13) Abdominal pain, epigastric  (primary encounter diagnosis)  Comment: she has esophagitis and possible gastritis. Not sure about pancreatitis, lipase was elevated, but not significant. The pain might be worse now sec constipation   Plan: oxyCODONE (ROXICODONE) 5 MG IR tablet               Chief complaint:                                                      abd pain     SUBJECTIVE:   Swetha Patterson is a 47 year old female who presents to clinic today for the following health issues:     she has tried 20 mg Oxycodone but she felt it was too much and it caused vomiting. In ER she was given 5 mg Oxycodone and she tolerates 15 mg Oxycodone     She has intense epig apin and RUQ and it gets worse with cough.     She saw gastro Dr Caban, EGD 9/22/2017. Dx with HH and esophagitis     Small sip of water causes retrosternal pain and epig pain          ED/UC Followup:    Facility:  [facility]  Date of visit: 9/26/17  Reason for visit:   1. Other acute pancreatitis, unspecified complication status K85.80      Current Status: Same as when she was seen in the ED. Still not able to keep any food or water down, still in a lot of pain.            Review of Systems:                                                      ROS: negative for fever, chills, cough, wheezes, chest pain, shortness of breath, vomiting,  leg swelling pos for abd pain     A 10-point review of systems was obtained.  Those pertinent are above and in the in the Subjective section.  The rest of the systems are negative.           OBJECTIVE:             Physical exam:  Blood pressure 130/74, pulse 84, temperature 98.5  F (36.9  C), temperature source Oral, height 5' 4\" (1.626 m), weight 203 lb 1.6 oz (92.1 kg), last " menstrual period 04/15/2016, SpO2 97 %, not currently breastfeeding.   NAD, appears uncomfortable because of the pain   Skin: no rashes   Neck: supple, no JVD,  No thyroidmegaly. Lymph nodes nonpalpable cervical and supraclavicular.  Chest: clear to auscultation bilaterally, good respiratory effort  Heart: S1 S2, RRR, no mgr appreciated  Abdomen: soft, epig tender, no hepatosplenomegaly or masses appreciated, no abdominal bruit, present bowel sounds  Extremities: no edema,   Neurologic: A, Ox3, no focal signs appreciated    PMHx: reviewed  Past Medical History:   Diagnosis Date     Anxiety state, unspecified      Arthritis     right hip arthritis     Asthma      Chronic pain     back pain from cyst     Contact dermatitis and other eczema, due to unspecified cause      COPD (chronic obstructive pulmonary disease) (H)      Depressive disorder, not elsewhere classified      Emphysema with chronic bronchitis (H)      Esophageal reflux      Family history of ischemic heart disease      Gastro-oesophageal reflux disease      History of emphysema      Hoarseness      HTN, goal below 140/90      Hyperlipidemia LDL goal <130      Pneumonia      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)      PONV (postoperative nausea and vomiting)       PSHx: reviewed  Past Surgical History:   Procedure Laterality Date     ARTHROSCOPY SHOULDER DECOMPRESSION Left 10/21/2015    Procedure: ARTHROSCOPY SHOULDER DECOMPRESSION;  Surgeon: Julien Milian MD;  Location: [facility] OR     BRONCHIAL THERMOPLASTY N/A 11/14/2014    Procedure: BRONCHIAL THERMOPLASTY;  Surgeon: Ward Whitaker MD;  Location: [facility] GI     BRONCHIAL THERMOPLASTY N/A 12/19/2014    Procedure: BRONCHIAL THERMOPLASTY;  Surgeon: Ward Whitaker MD;  Location: [facility]     BRONCHIAL THERMOPLASTY N/A 2/6/2015    Procedure: BRONCHIAL THERMOPLASTY;  Surgeon: Ward Whitaker MD;  Location: [facility] OR     C APPENDECTOMY  at age 17     EXCISE NODE MEDIASTINAL  4/26/2013     Procedure: EXCISE NODE MEDIASTINAL;;  Surgeon: Av Peña MD;  Location:  OR     THORACOSCOPY  4/26/2013    Procedure: THORACOSCOPY;  LEFT VIDEO ASSISTED THORACOSCOPY, RESECTION OF POSTERIOR MEDIASTINAL MASS;  Surgeon: Av Peña MD;  Location:  OR     TONSILLECTOMY  as a kid     TONSILLECTOMY          Meds: reviewed  Current Outpatient Prescriptions   Medication Sig Dispense Refill     tiZANidine (ZANAFLEX) 4 MG tablet TK 1 T PO  TID PRF MUSCLE SPASM  0     oxyCODONE (ROXICODONE) 5 MG IR tablet Take 1 tablet (5 mg) by mouth every 6 hours as needed for pain 15 tablet 0     ondansetron (ZOFRAN ODT) 4 MG ODT tab Take 1 tablet (4 mg) by mouth every 8 hours as needed for nausea 10 tablet 0     diazepam (VALIUM) 10 MG tablet Take 1 tablet (10 mg) by mouth every 8 hours as needed for anxiety or sleep For sleep, anxiety, and muscle tension. 90 tablet 0     ondansetron (ZOFRAN) 8 MG tablet Take 0.5-1 tablets (4-8 mg) by mouth every 8 hours as needed for nausea 30 tablet 0     omeprazole (PRILOSEC) 20 MG CR capsule Take 1 capsule (20 mg) by mouth 2 times daily 30 capsule 0     hydrOXYzine (ATARAX) 50 MG tablet Take 1 tablet (50 mg) by mouth 4 times daily as needed for anxiety Increased dose. For anxiety 120 tablet 0     varenicline (CHANTIX STARTING MONTH PAK) 0.5 MG X 11 & 1 MG X 42 tablet Take 0.5 mg tab daily for 3 days, then 0.5 mg tab twice daily for 4 days, then 1 mg twice daily. 53 tablet 0     escitalopram (LEXAPRO) 20 MG tablet Take 1 tablet (20 mg) by mouth daily Increased dose. 90 tablet 1     doxepin (SINEQUAN) 10 MG capsule Take 1 capsule (10 mg) by mouth At Bedtime 30 capsule 1     venlafaxine (EFFEXOR-XR) 37.5 MG 24 hr capsule Take 1 capsule daily for 14 days, then take 2 capsules daily. 46 capsule 0     estradiol (ESTRACE VAGINAL) 0.1 MG/GM cream 1 gm pv nightly at bedtime for 2 weeks, then twice a week at bedtime for maintenance. 42.5 g 6     fluticasone (FLONASE) 50 MCG/ACT  spray Spray 2 sprays into both nostrils daily 16 g 11     ranitidine (ZANTAC) 75 MG tablet Take 2 tablets (150 mg) by mouth daily 90 tablet 1     cetirizine HCl 10 MG CAPS Take 1 capsule by mouth daily 90 capsule 3     atorvastatin (LIPITOR) 80 MG tablet Take 1 tablet (80 mg) by mouth daily 90 tablet 1     amLODIPine (NORVASC) 10 MG tablet Take 1 tablet (10 mg) by mouth daily 90 tablet 1     lisinopril (PRINIVIL/ZESTRIL) 20 MG tablet Take 1 tablet (20 mg) by mouth daily 90 tablet 1     irbesartan (AVAPRO) 300 MG tablet Take 1 tablet (300 mg) by mouth daily 90 tablet 1     montelukast (SINGULAIR) 10 MG tablet Take 1 tablet (10 mg) by mouth At Bedtime 30 tablet 1     furosemide (LASIX) 20 MG tablet Take 1 tablet (20 mg) by mouth daily as needed 90 tablet 1     NICORETTE 2 MG lozenge PLACE 1 LOZENGE INSIDE CHEEK Q HOUR PRF SMOKING CESSATION UTD  1     budesonide-formoterol (SYMBICORT) 160-4.5 MCG/ACT Inhaler Inhale 2 puffs into the lungs 2 times daily 3 Inhaler 3     ipratropium - albuterol 0.5 mg/2.5 mg/3 mL (DUONEB) 0.5-2.5 (3) MG/3ML neb solution Take 1 vial (3 mLs) by nebulization every 6 hours 360 mL 0     tiotropium (SPIRIVA HANDIHALER) 18 MCG capsule Inhale contents of one capsule daily. 30 capsule 1     albuterol (VENTOLIN HFA) 108 (90 BASE) MCG/ACT Inhaler Inhale 2 puffs into the lungs every 6 hours 3 Inhaler 3     ketoconazole (NIZORAL) 2 % shampoo Apply to the affected area and wash off after 5 minutes. 120 mL 1     desonide (DESOWEN) 0.05 % cream Apply sparingly to affected area three times daily as needed. 60 g 0     prochlorperazine (COMPAZINE) 5 MG tablet Take 1 tablet (5 mg) by mouth every 6 hours as needed for nausea or vomiting 90 tablet 0     order for DME Equipment being ordered: Nebulizer machine  Length of need-lifetime 1 Device 0     clindamycin-benzoyl peroxide (BENZACLIN) gel Apply topically 2 times daily 100 g 3     chlorhexidine (HIBICLENS) 4 % external liquid Wash ab, groin, thighs daily in  shower DO NOT PUT ON FACE 946 mL 3     VITAMIN D, CHOLECALCIFEROL, PO Take 2,000 Units by mouth daily         Soc Hx: reviewed  Fam Hx: reviewed          Roro Low MD  Internal Medicine

## 2017-09-29 NOTE — NURSING NOTE
"Chief Complaint   Patient presents with     ER F/U       Initial /74 (BP Location: Right arm, Patient Position: Chair, Cuff Size: Adult Large)  Pulse 84  Temp 98.5  F (36.9  C) (Oral)  Ht 5' 4\" (1.626 m)  Wt 203 lb 1.6 oz (92.1 kg)  LMP 04/15/2016  SpO2 97%  Breastfeeding? No  BMI 34.86 kg/m2 Estimated body mass index is 34.86 kg/(m^2) as calculated from the following:    Height as of this encounter: 5' 4\" (1.626 m).    Weight as of this encounter: 203 lb 1.6 oz (92.1 kg).  Medication Reconciliation: complete   Carolyn Villarreal CMA      "

## 2017-10-02 ENCOUNTER — TELEPHONE (OUTPATIENT)
Dept: INTERNAL MEDICINE | Facility: CLINIC | Age: 47
End: 2017-10-02

## 2017-10-02 NOTE — TELEPHONE ENCOUNTER
Patient calling to ask if she can have a letter to excuse her from classes until Friday 10/6/17 due to pain and feeling so sick. Patient states she does not want to get kicked out of the classes she is taking. Patient states she is waiting for results. Patient states she has an appointment Friday with PCP.  Provider please review and advise. Thank you.

## 2017-10-02 NOTE — LETTER
[facility]  [address], [address]  [place], [place]  [address]                                            TEL:[phone]  FAX:[phone]      Re: Swetha Patterson        October 3, 2017    To Whom It May Concern,      Swetha Patterson has been my patient for a few years. She has been ill for a few weeks and she not recovering well. Please excuse her from classes through October 31st, 2017.    Sincerely,      Roro Low MD

## 2017-10-03 NOTE — TELEPHONE ENCOUNTER
You may write the letter. From my point of view she can skip the classes until NOv. Please fill in the missing info     has been my patient for few years. She has been ill for few weeks and she is not recovering well. Please excuse her from classes ...... Until .......    Thanks

## 2017-10-03 NOTE — TELEPHONE ENCOUNTER
Called pt, vm left for pt to call clinic back.    When pt calls back please clarify who long she wants letter to be written for and how she'd like to get letter ( vs mail).

## 2017-10-03 NOTE — TELEPHONE ENCOUNTER
Pt calls back, states she plans on going to ED d/t feeling like she's dehydrated d/t ongoing issues with a esophageal hernia, gallbladder concerns, and pancreatitis per pt. Indicates it's been discussed with PCP that if she feels she's dehydrated to go to ED. States she urinates a couple times a day with less urine output. Has dry mouth. Reports she often vomits up the food and drink she attempts to get down.     Pt had nose bleed last night after vomiting that she's concerned about. States it stopped after 3 minutes. Discussed dry mucus membranes and the force of vomiting may have contributed to nose bleeding. Discuss seeking attention if a nose bleed doesn't stop or happens frequently.    Pt requests letter to say she's excused through 10/31. Her sister will  from . Letter completed and at .    Sent to MD as FYI regarding pt's plan to go to ER today r/t dehydration concerns.

## 2017-10-06 ENCOUNTER — OFFICE VISIT (OUTPATIENT)
Dept: INTERNAL MEDICINE | Facility: CLINIC | Age: 47
End: 2017-10-06
Payer: MEDICARE

## 2017-10-06 ENCOUNTER — TELEPHONE (OUTPATIENT)
Dept: INTERNAL MEDICINE | Facility: CLINIC | Age: 47
End: 2017-10-06

## 2017-10-06 VITALS
DIASTOLIC BLOOD PRESSURE: 74 MMHG | WEIGHT: 204.1 LBS | SYSTOLIC BLOOD PRESSURE: 128 MMHG | OXYGEN SATURATION: 98 % | TEMPERATURE: 97.8 F | HEART RATE: 85 BPM | HEIGHT: 64 IN | BODY MASS INDEX: 34.85 KG/M2

## 2017-10-06 DIAGNOSIS — R10.13 ABDOMINAL PAIN, EPIGASTRIC: ICD-10-CM

## 2017-10-06 PROCEDURE — 99213 OFFICE O/P EST LOW 20 MIN: CPT | Performed by: INTERNAL MEDICINE

## 2017-10-06 RX ORDER — OXYCODONE HYDROCHLORIDE 5 MG/1
5 TABLET ORAL EVERY 6 HOURS PRN
Qty: 35 TABLET | Refills: 0 | Status: SHIPPED | OUTPATIENT
Start: 2017-10-06 | End: 2017-10-12

## 2017-10-06 NOTE — NURSING NOTE
"Chief Complaint   Patient presents with     RECHECK     Pt is still having abdominal pain       Initial /74  Pulse 85  Temp 97.8  F (36.6  C) (Oral)  Ht 5' 4\" (1.626 m)  Wt 204 lb 1.6 oz (92.6 kg)  LMP 04/15/2016  SpO2 98%  Breastfeeding? No  BMI 35.03 kg/m2 Estimated body mass index is 35.03 kg/(m^2) as calculated from the following:    Height as of this encounter: 5' 4\" (1.626 m).    Weight as of this encounter: 204 lb 1.6 oz (92.6 kg).  Medication Reconciliation: complete   Aneesh Koehler MA    "

## 2017-10-06 NOTE — TELEPHONE ENCOUNTER
Name of person picking up: amador ewing      If not patient, relationship to patient: self     Type of identification: drivers license     DL #: [id number]    What was picked up: rx

## 2017-10-06 NOTE — MR AVS SNAPSHOT
After Visit Summary   10/6/2017    Swetha Patterson    MRN: [id number]           Patient Information     Date Of Birth          1970        Visit Information        Provider Department      10/6/2017 12:40 PM Roro Chawla MD [facility]        Today's Diagnoses     Abdominal pain, epigastric           Follow-ups after your visit        Your next 10 appointments already scheduled     Oct 12, 2017  3:20 PM CDT   SHORT with Roro Chawla MD   [facility] ([facility])    [address]  [facility] [address]   [phone]            Oct 25, 2017 12:40 PM CDT   (Arrive by 12:25 PM)   CT CHEST W/O CONTRAST with CT1   [facility] ([facility])    [address] [place] [id number] [phone]           Please bring any scans or X-rays taken at other hospitals, if similar tests were done. Also bring a list of your medicines, including vitamins, minerals and over-the-counter drugs. It is safest to leave personal items at home.  Be sure to tell your doctor:   If you have any allergies.   If there s any chance you are pregnant.   If you are breastfeeding.   If you have any special needs.  You do not need to do anything special to prepare.  Please wear loose clothing, such as a sweat suit or jogging clothes. Avoid snaps, zippers and other metal. We may ask you to undress and put on a hospital gown.            Oct 25, 2017  1:00 PM CDT   FULL PULMONARY FUNCTION with  PFL D   [facility] Pulmonary Function Testing ([facility])    [address] [place] [id number] [phone]            Oct 25, 2017  2:00 PM CDT   (Arrive by 1:45 PM)   Return Visit with Nita Williamson MD   [facility] ([facility])    [address] [place] [address]  "[place] [address]   [phone]            Oct 26, 2017 10:15 AM CDT   [facility] Visit with Milena Joseph NP   [facility]    [address]  [address]  [facility] [address]   [phone]            2017  1:00 PM CST   (Arrive by 12:30 PM)   NEW NECK with Agustin Palomares MD   [facility] ([facility])    [address] [place] [address]  4th Floor  [facility] [address] [phone]              Who to contact     If you have questions or need follow up information about today's clinic visit or your schedule please contact [facility] directly at [phone].  Normal or non-critical lab and imaging results will be communicated to you by MyChart, letter or phone within 4 business days after the clinic has received the results. If you do not hear from us within 7 days, please contact the clinic through MyChart or phone. If you have a critical or abnormal lab result, we will notify you by phone as soon as possible.  Submit refill requests through [employer] or call your pharmacy and they will forward the refill request to us. Please allow 3 business days for your refill to be completed.          Additional Information About Your Visit        [facility] Information     [employer] lets you send messages to your doctor, view your test results, renew your prescriptions, schedule appointments and more. To sign up, go to [email or web address] . Click on \"Log in\" on the left side of the screen, which will take you to the Welcome page. Then click on \"Sign up Now\" on the right side of the page.     You will be asked to enter the access code listed below, as well as some personal information. Please follow the directions to create your username and password.     Your access code is: [email or web address]  Expires: 2018  7:42 PM     Your access code will  in 90 days. If you need help or a new code, please call " "your [facility] clinic or [phone].        Care EveryWhere ID     This is your Care EveryWhere ID. This could be used by other organizations to access your [facility] medical records  [id number]        Your Vitals Were     Pulse Temperature Height Last Period Pulse Oximetry Breastfeeding?    85 97.8  F (36.6  C) (Oral) 5' 4\" (1.626 m) 04/15/2016 98% No    BMI (Body Mass Index)                   35.03 kg/m2            Blood Pressure from Last 3 Encounters:   10/06/17 128/74   09/29/17 130/74   09/26/17 112/62    Weight from Last 3 Encounters:   10/06/17 204 lb 1.6 oz (92.6 kg)   09/29/17 203 lb 1.6 oz (92.1 kg)   09/26/17 202 lb (91.6 kg)              Today, you had the following     No orders found for display         Where to get your medicines      Some of these will need a paper prescription and others can be bought over the counter.  Ask your nurse if you have questions.     Bring a paper prescription for each of these medications     oxyCODONE 5 MG IR tablet          Primary Care Provider Office Phone # Fax #    Roro Chawla [phone]       [address]  [facility] [address]        Equal Access to Services     CHARITY MANDUJANO AH: Hadii skinny ku hadasho Soomaali, waaxda luqadaha, qaybta kaalmada adeegyada, caryl wilkinsin haybebo cowan . So [facility] [phone].    ATENCIÓN: Si habla español, tiene a vargas disposición servicios gratuitos de asistencia lingüística. Llame al [phone].    We comply with applicable federal civil rights laws and [place] laws. We do not discriminate on the basis of race, color, national origin, age, disability, sex, sexual orientation, or gender identity.            Thank you!     Thank you for choosing [facility]  for your care. Our goal is always to provide you with excellent care. Hearing back from our patients is one way we can continue to improve our services. Please take a few minutes to complete the written " survey that you may receive in the mail after your visit with us. Thank you!             Your Updated Medication List - Protect others around you: Learn how to safely use, store and throw away your medicines at www.disposemymeds.org.          This list is accurate as of: 10/6/17  7:42 PM.  Always use your most recent med list.                   Brand Name Dispense Instructions for use Diagnosis    albuterol 108 (90 BASE) MCG/ACT Inhaler    VENTOLIN HFA    3 Inhaler    Inhale 2 puffs into the lungs every 6 hours    Shortness of breath       amLODIPine 10 MG tablet    NORVASC    90 tablet    Take 1 tablet (10 mg) by mouth daily    HTN, goal below 140/90       atorvastatin 80 MG tablet    LIPITOR    90 tablet    Take 1 tablet (80 mg) by mouth daily    Family history of ischemic heart disease, Hyperlipidemia LDL goal <130       budesonide-formoterol 160-4.5 MCG/ACT Inhaler    SYMBICORT    3 Inhaler    Inhale 2 puffs into the lungs 2 times daily    Uncomplicated asthma, unspecified asthma severity       cetirizine HCl 10 MG Caps     90 capsule    Take 1 capsule by mouth daily    Chronic rhinitis       chlorhexidine 4 % liquid    HIBICLENS    946 mL    Wash ab, groin, thighs daily in shower DO NOT PUT ON FACE    Hidradenitis suppurativa       clindamycin-benzoyl peroxide gel    BENZACLIN    100 g    Apply topically 2 times daily    Hidradenitis suppurativa       desonide 0.05 % cream    DESOWEN    60 g    Apply sparingly to affected area three times daily as needed.    Localized pruritus       diazepam 10 MG tablet    VALIUM    90 tablet    Take 1 tablet (10 mg) by mouth every 8 hours as needed for anxiety or sleep For sleep, anxiety, and muscle tension.    Chest wall pain, Muscle pain, Anxiety       doxepin 10 MG capsule    SINEquan    30 capsule    Take 1 capsule (10 mg) by mouth At Bedtime    Persistent insomnia       escitalopram 20 MG tablet    LEXAPRO    90 tablet    Take 1 tablet (20 mg) by mouth daily Increased  dose.    Anxiety       estradiol 0.1 MG/GM cream    ESTRACE VAGINAL    42.5 g    1 gm pv nightly at bedtime for 2 weeks, then twice a week at bedtime for maintenance.    Atrophic vaginitis       fluticasone 50 MCG/ACT spray    FLONASE    16 g    Spray 2 sprays into both nostrils daily    Severe chronic obstructive pulmonary disease (H)       furosemide 20 MG tablet    LASIX    90 tablet    Take 1 tablet (20 mg) by mouth daily as needed    Bilateral leg edema       hydrOXYzine 50 MG tablet    ATARAX    120 tablet    Take 1 tablet (50 mg) by mouth 4 times daily as needed for anxiety Increased dose. For anxiety    Anxiety       ipratropium - albuterol 0.5 mg/2.5 mg/3 mL 0.5-2.5 (3) MG/3ML neb solution    DUONEB    360 mL    Take 1 vial (3 mLs) by nebulization every 6 hours    Uncomplicated asthma, unspecified asthma severity       irbesartan 300 MG tablet    AVAPRO    90 tablet    Take 1 tablet (300 mg) by mouth daily    Essential hypertension with goal blood pressure less than 140/90       ketoconazole 2 % shampoo    NIZORAL    120 mL    Apply to the affected area and wash off after 5 minutes.    Localized pruritus       lisinopril 20 MG tablet    PRINIVIL/ZESTRIL    90 tablet    Take 1 tablet (20 mg) by mouth daily    Essential hypertension with goal blood pressure less than 140/90       montelukast 10 MG tablet    SINGULAIR    30 tablet    Take 1 tablet (10 mg) by mouth At Bedtime    Dust allergy, Severe chronic obstructive pulmonary disease (H)       NICORETTE 2 MG lozenge   Generic drug:  nicotine polacrilex      PLACE 1 LOZENGE INSIDE CHEEK Q HOUR PRF SMOKING CESSATION UTD        omeprazole 20 MG CR capsule    priLOSEC    30 capsule    Take 1 capsule (20 mg) by mouth 2 times daily    Acute gastritis, presence of bleeding unspecified, unspecified gastritis type       ondansetron 8 MG tablet    ZOFRAN    30 tablet    Take 0.5-1 tablets (4-8 mg) by mouth every 8 hours as needed for nausea    Nausea       order for  DME     1 Device    Equipment being ordered: Nebulizer machine Length of need-lifetime    Chronic lung disease, Cough, Severe persistent asthma with acute exacerbation       oxyCODONE 5 MG IR tablet    ROXICODONE    35 tablet    Take 1 tablet (5 mg) by mouth every 6 hours as needed for pain    Abdominal pain, epigastric       prochlorperazine 5 MG tablet    COMPAZINE    90 tablet    Take 1 tablet (5 mg) by mouth every 6 hours as needed for nausea or vomiting    Nausea       ranitidine 75 MG tablet    ZANTAC    90 tablet    Take 2 tablets (150 mg) by mouth daily    Uncomplicated asthma, unspecified asthma severity       tiotropium 18 MCG capsule    SPIRIVA HANDIHALER    30 capsule    Inhale contents of one capsule daily.    Other emphysema (H)       tiZANidine 4 MG tablet    ZANAFLEX     TK 1 T PO  TID PRF MUSCLE SPASM        varenicline 0.5 MG X 11 & 1 MG X 42 tablet    CHANTIX STARTING MONTH CHAPITO    53 tablet    Take 0.5 mg tab daily for 3 days, then 0.5 mg tab twice daily for 4 days, then 1 mg twice daily.    Encounter for smoking cessation counseling       venlafaxine 37.5 MG 24 hr capsule    EFFEXOR-XR    46 capsule    Take 1 capsule daily for 14 days, then take 2 capsules daily.    Menopausal syndrome (hot flashes)       VITAMIN D (CHOLECALCIFEROL) PO      Take 2,000 Units by mouth daily

## 2017-10-06 NOTE — PROGRESS NOTES
"      ASSESSMENT & PLAN:                                                      (R10.13) Abdominal pain, epigastric - debilitating No clear etiol Under work up with GI   Comment:   Plan: oxyCODONE (ROXICODONE) 5 MG IR tablet          Total Oxy 15 mg 5 times a day        Chief Complaint:                                                        abd pain     SUBJECTIVE:                                                    History of present illness     Very frustrated: the epig pain continues. She feels hungry, dehydrated. She statres sips of water are coming up.     Next appointment with GI is available in 2 weeks     She feels little better afte iv hydration.     ROS:                                                      ROS: negative for fever, chills, cough, wheezes, chest pain, shortness of breath, vomiting,  leg swelling fos for abd pain     A 10-point review of systems was obtained.  Those pertinent are above and in the in the Subjective section.  The rest of the systems are negative.        OBJECTIVE:                                                    Physical Exam :    Blood pressure 128/74, pulse 85, temperature 97.8  F (36.6  C), temperature source Oral, height 5' 4\" (1.626 m), weight 204 lb 1.6 oz (92.6 kg), last menstrual period 04/15/2016, SpO2 98 %, not currently breastfeeding.   NAD, appears uncomfortable  Skin: no rashes   Chest: clear to auscultation bilaterally, good respiratory effort  Heart: S1 S2, RRR, no mgr appreciated  Abdomen: soft, epig tender, no hepatosplenomegaly or masses appreciated, no abdominal bruit, present bowel sounds  Extremities: no edema  Neurologic: A, Ox3, no focal signs appreciated    PMHx: reviewed  Past Medical History:   Diagnosis Date     Anxiety state, unspecified      Arthritis     right hip arthritis     Asthma      Chronic pain     back pain from cyst     Contact dermatitis and other eczema, due to unspecified cause      COPD (chronic obstructive pulmonary disease) (H)      " Depressive disorder, not elsewhere classified      Emphysema with chronic bronchitis (H)      Esophageal reflux      Family history of ischemic heart disease      Gastro-oesophageal reflux disease      History of emphysema      Hoarseness      HTN, goal below 140/90      Hyperlipidemia LDL goal <130      Pneumonia      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)      PONV (postoperative nausea and vomiting)       PSHx: reviewed  Past Surgical History:   Procedure Laterality Date     ARTHROSCOPY SHOULDER DECOMPRESSION Left 10/21/2015    Procedure: ARTHROSCOPY SHOULDER DECOMPRESSION;  Surgeon: Julien Milian MD;  Location: [facility]     BRONCHIAL THERMOPLASTY N/A 11/14/2014    Procedure: BRONCHIAL THERMOPLASTY;  Surgeon: Ward Whitaker MD;  Location: [facility]     BRONCHIAL THERMOPLASTY N/A 12/19/2014    Procedure: BRONCHIAL THERMOPLASTY;  Surgeon: Ward Whitaker MD;  Location: [facility]     BRONCHIAL THERMOPLASTY N/A 2/6/2015    Procedure: BRONCHIAL THERMOPLASTY;  Surgeon: Ward Whitaker MD;  Location: [facility]     C APPENDECTOMY  at age 17     EXCISE NODE MEDIASTINAL  4/26/2013    Procedure: EXCISE NODE MEDIASTINAL;;  Surgeon: Av Peña MD;  Location:  OR     THORACOSCOPY  4/26/2013    Procedure: THORACOSCOPY;  LEFT VIDEO ASSISTED THORACOSCOPY, RESECTION OF POSTERIOR MEDIASTINAL MASS;  Surgeon: Av Peña MD;  Location:  OR     TONSILLECTOMY  as a kid     TONSILLECTOMY          Meds: reviewed  Current Outpatient Prescriptions   Medication Sig Dispense Refill     oxyCODONE (ROXICODONE) 5 MG IR tablet Take 1 tablet (5 mg) by mouth every 6 hours as needed for pain 35 tablet 0     tiZANidine (ZANAFLEX) 4 MG tablet TK 1 T PO  TID PRF MUSCLE SPASM  0     diazepam (VALIUM) 10 MG tablet Take 1 tablet (10 mg) by mouth every 8 hours as needed for anxiety or sleep For sleep, anxiety, and muscle tension. 90 tablet 0     ondansetron (ZOFRAN) 8 MG tablet Take 0.5-1 tablets (4-8 mg) by  mouth every 8 hours as needed for nausea 30 tablet 0     omeprazole (PRILOSEC) 20 MG CR capsule Take 1 capsule (20 mg) by mouth 2 times daily 30 capsule 0     hydrOXYzine (ATARAX) 50 MG tablet Take 1 tablet (50 mg) by mouth 4 times daily as needed for anxiety Increased dose. For anxiety 120 tablet 0     varenicline (CHANTIX STARTING MONTH PAK) 0.5 MG X 11 & 1 MG X 42 tablet Take 0.5 mg tab daily for 3 days, then 0.5 mg tab twice daily for 4 days, then 1 mg twice daily. 53 tablet 0     escitalopram (LEXAPRO) 20 MG tablet Take 1 tablet (20 mg) by mouth daily Increased dose. 90 tablet 1     doxepin (SINEQUAN) 10 MG capsule Take 1 capsule (10 mg) by mouth At Bedtime 30 capsule 1     venlafaxine (EFFEXOR-XR) 37.5 MG 24 hr capsule Take 1 capsule daily for 14 days, then take 2 capsules daily. 46 capsule 0     estradiol (ESTRACE VAGINAL) 0.1 MG/GM cream 1 gm pv nightly at bedtime for 2 weeks, then twice a week at bedtime for maintenance. 42.5 g 6     fluticasone (FLONASE) 50 MCG/ACT spray Spray 2 sprays into both nostrils daily 16 g 11     ranitidine (ZANTAC) 75 MG tablet Take 2 tablets (150 mg) by mouth daily 90 tablet 1     cetirizine HCl 10 MG CAPS Take 1 capsule by mouth daily 90 capsule 3     atorvastatin (LIPITOR) 80 MG tablet Take 1 tablet (80 mg) by mouth daily 90 tablet 1     amLODIPine (NORVASC) 10 MG tablet Take 1 tablet (10 mg) by mouth daily 90 tablet 1     lisinopril (PRINIVIL/ZESTRIL) 20 MG tablet Take 1 tablet (20 mg) by mouth daily 90 tablet 1     irbesartan (AVAPRO) 300 MG tablet Take 1 tablet (300 mg) by mouth daily 90 tablet 1     montelukast (SINGULAIR) 10 MG tablet Take 1 tablet (10 mg) by mouth At Bedtime 30 tablet 1     furosemide (LASIX) 20 MG tablet Take 1 tablet (20 mg) by mouth daily as needed 90 tablet 1     NICORETTE 2 MG lozenge PLACE 1 LOZENGE INSIDE CHEEK Q HOUR PRF SMOKING CESSATION UTD  1     budesonide-formoterol (SYMBICORT) 160-4.5 MCG/ACT Inhaler Inhale 2 puffs into the lungs 2 times  daily 3 Inhaler 3     ipratropium - albuterol 0.5 mg/2.5 mg/3 mL (DUONEB) 0.5-2.5 (3) MG/3ML neb solution Take 1 vial (3 mLs) by nebulization every 6 hours 360 mL 0     tiotropium (SPIRIVA HANDIHALER) 18 MCG capsule Inhale contents of one capsule daily. 30 capsule 1     albuterol (VENTOLIN HFA) 108 (90 BASE) MCG/ACT Inhaler Inhale 2 puffs into the lungs every 6 hours 3 Inhaler 3     ketoconazole (NIZORAL) 2 % shampoo Apply to the affected area and wash off after 5 minutes. 120 mL 1     desonide (DESOWEN) 0.05 % cream Apply sparingly to affected area three times daily as needed. 60 g 0     prochlorperazine (COMPAZINE) 5 MG tablet Take 1 tablet (5 mg) by mouth every 6 hours as needed for nausea or vomiting 90 tablet 0     order for DME Equipment being ordered: Nebulizer machine  Length of need-lifetime 1 Device 0     clindamycin-benzoyl peroxide (BENZACLIN) gel Apply topically 2 times daily 100 g 3     chlorhexidine (HIBICLENS) 4 % external liquid Wash ab, groin, thighs daily in shower DO NOT PUT ON FACE 946 mL 3     VITAMIN D, CHOLECALCIFEROL, PO Take 2,000 Units by mouth daily         Soc Hx: reviewed  Fam Hx: reviewed          Roro Low MD  Internal Medicine

## 2017-10-09 ENCOUNTER — EXTERNAL ORDER RESULTS (OUTPATIENT)
Dept: SURGERY | Facility: CLINIC | Age: 47
End: 2017-10-09

## 2017-10-09 ENCOUNTER — TRANSFERRED RECORDS (OUTPATIENT)
Dept: HEALTH INFORMATION MANAGEMENT | Facility: CLINIC | Age: 47
End: 2017-10-09

## 2017-10-10 ENCOUNTER — HOSPITAL ENCOUNTER (OUTPATIENT)
Dept: NUCLEAR MEDICINE | Facility: CLINIC | Age: 47
Setting detail: NUCLEAR MEDICINE
Discharge: HOME | End: 2017-10-10
Attending: INTERNAL MEDICINE | Admitting: INTERNAL MEDICINE
Payer: MEDICARE

## 2017-10-10 DIAGNOSIS — R10.13 EPIGASTRIC PAIN: ICD-10-CM

## 2017-10-10 PROCEDURE — 25000128 H RX IP 250 OP 636: Performed by: INTERNAL MEDICINE

## 2017-10-10 PROCEDURE — A9537 TC99M MEBROFENIN: HCPCS | Performed by: INTERNAL MEDICINE

## 2017-10-10 PROCEDURE — 78227 HEPATOBIL SYST IMAGE W/DRUG: CPT

## 2017-10-10 PROCEDURE — 34300033 ZZH RX 343: Performed by: INTERNAL MEDICINE

## 2017-10-10 RX ORDER — KIT FOR THE PREPARATION OF TECHNETIUM TC 99M MEBROFENIN 45 MG/10ML
6 INJECTION, POWDER, LYOPHILIZED, FOR SOLUTION INTRAVENOUS ONCE
Status: COMPLETED | OUTPATIENT
Start: 2017-10-10 | End: 2017-10-10

## 2017-10-10 RX ADMIN — MEBROFENIN 6.5 MILLICURIE: 45 INJECTION, POWDER, LYOPHILIZED, FOR SOLUTION INTRAVENOUS at 09:05

## 2017-10-10 RX ADMIN — SINCALIDE 1.9 MCG: 5 INJECTION, POWDER, LYOPHILIZED, FOR SOLUTION INTRAVENOUS at 10:06

## 2017-10-12 ENCOUNTER — OFFICE VISIT (OUTPATIENT)
Dept: INTERNAL MEDICINE | Facility: CLINIC | Age: 47
End: 2017-10-12
Payer: MEDICARE

## 2017-10-12 ENCOUNTER — TRANSFERRED RECORDS (OUTPATIENT)
Dept: HEALTH INFORMATION MANAGEMENT | Facility: CLINIC | Age: 47
End: 2017-10-12

## 2017-10-12 VITALS
OXYGEN SATURATION: 97 % | TEMPERATURE: 98.3 F | BODY MASS INDEX: 34.59 KG/M2 | DIASTOLIC BLOOD PRESSURE: 84 MMHG | WEIGHT: 201.5 LBS | SYSTOLIC BLOOD PRESSURE: 138 MMHG | HEART RATE: 96 BPM

## 2017-10-12 DIAGNOSIS — R10.13 ABDOMINAL PAIN, EPIGASTRIC: Primary | ICD-10-CM

## 2017-10-12 DIAGNOSIS — F41.9 ANXIETY: ICD-10-CM

## 2017-10-12 DIAGNOSIS — F11.20 CONTINUOUS OPIOID DEPENDENCE (H): Chronic | ICD-10-CM

## 2017-10-12 PROCEDURE — 99214 OFFICE O/P EST MOD 30 MIN: CPT | Performed by: INTERNAL MEDICINE

## 2017-10-12 RX ORDER — OXYCODONE HYDROCHLORIDE 5 MG/1
5 TABLET ORAL EVERY 6 HOURS PRN
Qty: 35 TABLET | Refills: 0 | Status: SHIPPED | OUTPATIENT
Start: 2017-10-12 | End: 2017-10-16

## 2017-10-12 RX ORDER — HYDROXYZINE HYDROCHLORIDE 50 MG/1
50 TABLET, FILM COATED ORAL 4 TIMES DAILY PRN
Qty: 120 TABLET | Refills: 0 | Status: SHIPPED | OUTPATIENT
Start: 2017-10-12 | End: 2017-11-13

## 2017-10-12 NOTE — NURSING NOTE
"Chief Complaint   Patient presents with     Abdominal Pain     follow up scan and renew 5 mg Oxycodone meds, BP med refills       Initial /84 (BP Location: Left arm, Patient Position: Sitting, Cuff Size: Adult Large)  Pulse 96  Temp 98.3  F (36.8  C) (Oral)  Wt 201 lb 8 oz (91.4 kg)  LMP 04/15/2016  SpO2 97%  Breastfeeding? No  BMI 34.59 kg/m2 Estimated body mass index is 34.59 kg/(m^2) as calculated from the following:    Height as of 10/6/17: 5' 4\" (1.626 m).    Weight as of this encounter: 201 lb 8 oz (91.4 kg).  Medication Reconciliation: complete     Lou Edgar, TYE      "

## 2017-10-12 NOTE — MR AVS SNAPSHOT
After Visit Summary   10/12/2017    Swetha Patterson    MRN: [id number]           Patient Information     Date Of Birth          1970        Visit Information        Provider Department      10/12/2017 3:20 PM Roro Chawla MD [facility]        Today's Diagnoses     Abdominal pain, epigastric    -  1    Continuous opioid dependence (HCC) opioid for chr cough and chest pain ^^^^        Anxiety           Follow-ups after your visit        Additional Services     GENERAL SURG ADULT REFERRAL       Your provider has referred you to: [facility], [place] [phone]    Please be aware that coverage of these services is subject to the terms and limitations of your health insurance plan.  Call member services at your health plan with any benefit or coverage questions.      Please bring the following to your appointment:  >>   Any x-rays, CTs or MRIs which have been performed.  Contact the facility where they were done to arrange for  prior to your scheduled appointment.  Any new CT, MRI or other procedures ordered by your specialist must be performed at a [facility] facility or coordinated by your clinic's referral office.    >>   List of current medications   >>   This referral request   >>   Any documents/labs given to you for this referral                  Your next 10 appointments already scheduled     Oct 19, 2017   Procedure with Ney Jerry MD   [facility] (--)    [address] [facility] [address]   [phone]            Oct 19, 2017  8:45 AM CDT   [facility] Same Day Surgery with Ney Jerry MD, Kerry Rock PA-C   [facility]    [facility]   [phone]            Oct 24, 2017 11:00 AM CDT   SHORT with Roro Chawla MD   [facility]  [place] ([facility])    [address]  [facility] [address]   [phone]            Oct 25, 2017 12:40 PM CDT   (Arrive by 12:25 PM)   CT CHEST W/O CONTRAST with UCCT1   [facility] [place] [facility] ([facility])    [address]  [address] [facility] [id number] [phone]           Please bring any scans or X-rays taken at other hospitals, if similar tests were done. Also bring a list of your medicines, including vitamins, minerals and over-the-counter drugs. It is safest to leave personal items at home.  Be sure to tell your doctor:   If you have any allergies.   If there s any chance you are pregnant.   If you are breastfeeding.   If you have any special needs.  You do not need to do anything special to prepare.  Please wear loose clothing, such as a sweat suit or jogging clothes. Avoid snaps, zippers and other metal. We may ask you to undress and put on a hospital gown.            Oct 25, 2017  1:00 PM CDT   FULL PULMONARY FUNCTION with  PFL D   [facility] Function [facility] ([facility])    [address]  [address] [facility] [id number] [phone]            Oct 25, 2017  2:00 PM CDT   (Arrive by 1:45 PM)   Return Visit with Nita Williamson MD   [facility] ([facility])    [address]  [address] [facility] [id number] [phone]            Oct 26, 2017 10:15 AM CDT   [facility] Visit with Milena Joseph NP   [facility]    [address]  [address]  [facility] [address] [phone]            Nov 09, 2017  1:00 PM CST   (Arrive by 12:30 PM)   NEW NECK with Agustin Palomares MD   [facility] ([facility])    [address] [place] [address]  [address] [facility] [id number] [phone]              Who to contact     If  "you have questions or need follow up information about today's clinic visit or your schedule please contact [facility] directly at [phone].  Normal or non-critical lab and imaging results will be communicated to you by MyChart, letter or phone within 4 business days after the clinic has received the results. If you do not hear from us within 7 days, please contact the clinic through [facility] or phone. If you have a critical or abnormal lab result, we will notify you by phone as soon as possible.  Submit refill requests through [facility] or call your pharmacy and they will forward the refill request to us. Please allow 3 business days for your refill to be completed.          Additional Information About Your Visit        [facility] Information     [facility] lets you send messages to your doctor, view your test results, renew your prescriptions, schedule appointments and more. To sign up, go to [email or web address] . Click on \"Log in\" on the left side of the screen, which will take you to the Welcome page. Then click on \"Sign up Now\" on the right side of the page.     You will be asked to enter the access code listed below, as well as some personal information. Please follow the directions to create your username and password.     Your access code is: [email or web address]  Expires: 2018  7:42 PM     Your access code will  in 90 days. If you need help or a new code, please call your [place] clinic or [phone].        Care EveryWhere ID     This is your Care EveryWhere ID. This could be used by other organizations to access your [place] medical records  [id number]        Your Vitals Were     Pulse Temperature Last Period Pulse Oximetry Breastfeeding? BMI (Body Mass Index)    96 98.3  F (36.8  C) (Oral) 04/15/2016 97% No 34.59 kg/m2       Blood Pressure from Last 3 Encounters:   10/16/17 104/68   10/13/17 120/80   10/12/17 138/84    Weight from Last 3 Encounters:   10/16/17 201 lb 11.2 oz " (91.5 kg)   10/13/17 201 lb (91.2 kg)   10/12/17 201 lb 8 oz (91.4 kg)              We Performed the Following     GENERAL SURG ADULT REFERRAL          Where to get your medicines      These medications were sent to [facility] [place], [place] - [address]  [address] [facility] [address]     Phone:  [phone]     hydrOXYzine 50 MG tablet         Some of these will need a paper prescription and others can be bought over the counter.  Ask your nurse if you have questions.     Bring a paper prescription for each of these medications     oxyCODONE 5 MG IR tablet          Primary Care Provider Office Phone # Fax #    Roro Chawla [phone]       [address]  [facility] [address]        Equal Access to Services     CHARITY MANDUJANO : Hadii skinny ledezma hadasho Soomaali, waaxda luqadaha, qaybta kaalmada adeegyada, caryl rodas haybebo cowan . So [facility] [phone].    ATENCIÓN: Si habla español, tiene a vargas disposición servicios gratuitos de asistencia lingüística. Llame al [phone].    We comply with applicable federal civil rights laws and [place] laws. We do not discriminate on the basis of race, color, national origin, age, disability, sex, sexual orientation, or gender identity.            Thank you!     Thank you for choosing [facility]  for your care. Our goal is always to provide you with excellent care. Hearing back from our patients is one way we can continue to improve our services. Please take a few minutes to complete the written survey that you may receive in the mail after your visit with us. Thank you!             Your Updated Medication List - Protect others around you: Learn how to safely use, store and throw away your medicines at www.disposemymeds.org.          This list is accurate as of: 10/12/17 11:59 PM.  Always use your most recent med list.                   Brand Name Dispense  Instructions for use Diagnosis    albuterol 108 (90 BASE) MCG/ACT Inhaler    VENTOLIN HFA    3 Inhaler    Inhale 2 puffs into the lungs every 6 hours    Shortness of breath       amLODIPine 10 MG tablet    NORVASC    90 tablet    Take 1 tablet (10 mg) by mouth daily    HTN, goal below 140/90       atorvastatin 80 MG tablet    LIPITOR    90 tablet    Take 1 tablet (80 mg) by mouth daily    Family history of ischemic heart disease, Hyperlipidemia LDL goal <130       budesonide-formoterol 160-4.5 MCG/ACT Inhaler    SYMBICORT    3 Inhaler    Inhale 2 puffs into the lungs 2 times daily    Uncomplicated asthma, unspecified asthma severity       cetirizine HCl 10 MG Caps     90 capsule    Take 1 capsule by mouth daily    Chronic rhinitis       chlorhexidine 4 % liquid    HIBICLENS    946 mL    Wash ab, groin, thighs daily in shower DO NOT PUT ON FACE    Hidradenitis suppurativa       clindamycin-benzoyl peroxide gel    BENZACLIN    100 g    Apply topically 2 times daily    Hidradenitis suppurativa       desonide 0.05 % cream    DESOWEN    60 g    Apply sparingly to affected area three times daily as needed.    Localized pruritus       diazepam 10 MG tablet    VALIUM    90 tablet    Take 1 tablet (10 mg) by mouth every 8 hours as needed for anxiety or sleep For sleep, anxiety, and muscle tension.    Chest wall pain, Muscle pain, Anxiety       doxepin 10 MG capsule    SINEquan    30 capsule    Take 1 capsule (10 mg) by mouth At Bedtime    Persistent insomnia       escitalopram 20 MG tablet    LEXAPRO    90 tablet    Take 1 tablet (20 mg) by mouth daily Increased dose.    Anxiety       estradiol 0.1 MG/GM cream    ESTRACE VAGINAL    42.5 g    1 gm pv nightly at bedtime for 2 weeks, then twice a week at bedtime for maintenance.    Atrophic vaginitis       fluticasone 50 MCG/ACT spray    FLONASE    16 g    Spray 2 sprays into both nostrils daily    Severe chronic obstructive pulmonary disease (H)       furosemide 20 MG tablet     LASIX    90 tablet    Take 1 tablet (20 mg) by mouth daily as needed    Bilateral leg edema       hydrOXYzine 50 MG tablet    ATARAX    120 tablet    Take 1 tablet (50 mg) by mouth 4 times daily as needed for anxiety Increased dose. For anxiety    Anxiety       ipratropium - albuterol 0.5 mg/2.5 mg/3 mL 0.5-2.5 (3) MG/3ML neb solution    DUONEB    360 mL    Take 1 vial (3 mLs) by nebulization every 6 hours    Uncomplicated asthma, unspecified asthma severity       ketoconazole 2 % shampoo    NIZORAL    120 mL    Apply to the affected area and wash off after 5 minutes.    Localized pruritus       lisinopril 20 MG tablet    PRINIVIL/ZESTRIL    90 tablet    Take 1 tablet (20 mg) by mouth daily    Essential hypertension with goal blood pressure less than 140/90       montelukast 10 MG tablet    SINGULAIR    30 tablet    Take 1 tablet (10 mg) by mouth At Bedtime    Dust allergy, Severe chronic obstructive pulmonary disease (H)       NICORETTE 2 MG lozenge   Generic drug:  nicotine polacrilex      PLACE 1 LOZENGE INSIDE CHEEK Q HOUR PRF SMOKING CESSATION UTD        omeprazole 20 MG CR capsule    priLOSEC    30 capsule    Take 1 capsule (20 mg) by mouth 2 times daily    Acute gastritis, presence of bleeding unspecified, unspecified gastritis type       ondansetron 8 MG tablet    ZOFRAN    30 tablet    Take 0.5-1 tablets (4-8 mg) by mouth every 8 hours as needed for nausea    Nausea       order for DME     1 Device    Equipment being ordered: Nebulizer machine Length of need-lifetime    Chronic lung disease, Cough, Severe persistent asthma with acute exacerbation       oxyCODONE 5 MG IR tablet    ROXICODONE    35 tablet    Take 1 tablet (5 mg) by mouth every 6 hours as needed for pain    Abdominal pain, epigastric       prochlorperazine 5 MG tablet    COMPAZINE    90 tablet    Take 1 tablet (5 mg) by mouth every 6 hours as needed for nausea or vomiting    Nausea       ranitidine 75 MG tablet    ZANTAC    90 tablet     Take 2 tablets (150 mg) by mouth daily    Uncomplicated asthma, unspecified asthma severity       tiotropium 18 MCG capsule    SPIRIVA HANDIHALER    30 capsule    Inhale contents of one capsule daily.    Other emphysema (H)       tiZANidine 4 MG tablet    ZANAFLEX     TK 1 T PO  TID PRF MUSCLE SPASM        varenicline 0.5 MG X 11 & 1 MG X 42 tablet    CHANTIX STARTING MONTH [place]AP[place]    53 tablet    Take 0.5 mg tab daily for 3 days, then 0.5 mg tab twice daily for 4 days, then 1 mg twice daily.    Encounter for smoking cessation counseling       venlafaxine 37.5 MG 24 hr capsule    EFFEXOR-XR    46 capsule    Take 1 capsule daily for 14 days, then take 2 capsules daily.    Menopausal syndrome (hot flashes)       VITAMIN D (CHOLECALCIFEROL) PO      Take 2,000 Units by mouth daily

## 2017-10-12 NOTE — PROGRESS NOTES
Dr Low's note        ASSESSMENT & PLAN:                                                      Rukhsana has been on chronic opioid treatment to help with the cough and the chest pain related with her unstoppable cough. Her usual dose is Oxycodone 10 mg 5 times a day. With the new abd pain she needs more. She couldn't tolerate 20 mg. She tolerates 15 mg. She has 10 mg left at home. I will supplement with 5 mg until she has the surgery.    (R10.13) Abdominal pain, epigastric  (primary encounter diagnosis)  Comment:   Plan: GENERAL SURG ADULT REFERRAL,        oxyCODONE (ROXICODONE) 5 MG IR tablet            (F41.9) Anxiety  Comment:   Plan: hydrOXYzine (ATARAX) 50 MG tablet            (F11.20) Continuous opioid dependence (HCC) opioid for chr cough and chest pain ^^^^  Comment:   Plan:        Chief complaint:                                                      abd pain     SUBJECTIVE:   Swetha Patterson is a 47 year old female who presents to clinic today for the following health issues:    Dr Oakes told her she needs Gallbladder removed. She needs surgeon referral     follow up scan and renew 5 mg Oxycodone meds, BP med refills, is having pain level 10    Review of Systems:                                                      ROS: negative for fever, chills,  wheezes, chest pain, shortness of breath, vomiting,  leg swelling pos for abd pain and chr cough     A 10-point review of systems was obtained.  Those pertinent are above and in the in the Subjective section.  The rest of the systems are negative.           OBJECTIVE:             Physical exam:  Blood pressure 138/84, pulse 96, temperature 98.3  F (36.8  C), temperature source Oral, weight 201 lb 8 oz (91.4 kg), last menstrual period 04/15/2016, SpO2 97 %, not currently breastfeeding.   NAD, appears un comfortable  Skin: no rashes   Chest: clear to auscultation bilaterally, good respiratory effort  Heart: S1 S2, RRR, no mgr appreciated  Abdomen: soft,  tender, no  hepatosplenomegaly or masses appreciated, no abdominal bruit, present bowel sounds  Extremities: no edema,   Neurologic: A, Ox3, no focal signs appreciated    PMHx: reviewed  Past Medical History:   Diagnosis Date     Anxiety state, unspecified      Arthritis     right hip arthritis     Asthma      Chronic pain     back pain from cyst     Contact dermatitis and other eczema, due to unspecified cause      COPD (chronic obstructive pulmonary disease) (H)      Depressive disorder, not elsewhere classified      Emphysema with chronic bronchitis (H)      Esophageal reflux      Family history of ischemic heart disease      Gastro-oesophageal reflux disease      History of emphysema      Hoarseness      HTN, goal below 140/90      Hyperlipidemia LDL goal <130      Pneumonia      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)      PONV (postoperative nausea and vomiting)       PSHx: reviewed  Past Surgical History:   Procedure Laterality Date     ARTHROSCOPY SHOULDER DECOMPRESSION Left 10/21/2015    Procedure: ARTHROSCOPY SHOULDER DECOMPRESSION;  Surgeon: Julien Milian MD;  Location: [facility]     BRONCHIAL THERMOPLASTY N/A 11/14/2014    Procedure: BRONCHIAL THERMOPLASTY;  Surgeon: Ward Whitaker MD;  Location: [facility]     BRONCHIAL THERMOPLASTY N/A 12/19/2014    Procedure: BRONCHIAL THERMOPLASTY;  Surgeon: Ward Whitaker MD;  Location: [facility]     BRONCHIAL THERMOPLASTY N/A 2/6/2015    Procedure: BRONCHIAL THERMOPLASTY;  Surgeon: Ward Whitaker MD;  Location: [facility]     C APPENDECTOMY  at age 17     EXCISE NODE MEDIASTINAL  4/26/2013    Procedure: EXCISE NODE MEDIASTINAL;;  Surgeon: Av Peña MD;  Location:  OR     THORACOSCOPY  4/26/2013    Procedure: THORACOSCOPY;  LEFT VIDEO ASSISTED THORACOSCOPY, RESECTION OF POSTERIOR MEDIASTINAL MASS;  Surgeon: Av Peña MD;  Location:  OR     TONSILLECTOMY  as a kid     TONSILLECTOMY          Meds: reviewed  Current Outpatient  Prescriptions   Medication Sig Dispense Refill     oxyCODONE (ROXICODONE) 5 MG IR tablet Take 1 tablet (5 mg) by mouth every 6 hours as needed for pain 35 tablet 0     tiZANidine (ZANAFLEX) 4 MG tablet TK 1 T PO  TID PRF MUSCLE SPASM  0     diazepam (VALIUM) 10 MG tablet Take 1 tablet (10 mg) by mouth every 8 hours as needed for anxiety or sleep For sleep, anxiety, and muscle tension. 90 tablet 0     ondansetron (ZOFRAN) 8 MG tablet Take 0.5-1 tablets (4-8 mg) by mouth every 8 hours as needed for nausea 30 tablet 0     omeprazole (PRILOSEC) 20 MG CR capsule Take 1 capsule (20 mg) by mouth 2 times daily 30 capsule 0     hydrOXYzine (ATARAX) 50 MG tablet Take 1 tablet (50 mg) by mouth 4 times daily as needed for anxiety Increased dose. For anxiety 120 tablet 0     varenicline (CHANTIX STARTING MONTH PAK) 0.5 MG X 11 & 1 MG X 42 tablet Take 0.5 mg tab daily for 3 days, then 0.5 mg tab twice daily for 4 days, then 1 mg twice daily. 53 tablet 0     escitalopram (LEXAPRO) 20 MG tablet Take 1 tablet (20 mg) by mouth daily Increased dose. 90 tablet 1     doxepin (SINEQUAN) 10 MG capsule Take 1 capsule (10 mg) by mouth At Bedtime 30 capsule 1     venlafaxine (EFFEXOR-XR) 37.5 MG 24 hr capsule Take 1 capsule daily for 14 days, then take 2 capsules daily. 46 capsule 0     estradiol (ESTRACE VAGINAL) 0.1 MG/GM cream 1 gm pv nightly at bedtime for 2 weeks, then twice a week at bedtime for maintenance. 42.5 g 6     fluticasone (FLONASE) 50 MCG/ACT spray Spray 2 sprays into both nostrils daily 16 g 11     ranitidine (ZANTAC) 75 MG tablet Take 2 tablets (150 mg) by mouth daily 90 tablet 1     cetirizine HCl 10 MG CAPS Take 1 capsule by mouth daily 90 capsule 3     atorvastatin (LIPITOR) 80 MG tablet Take 1 tablet (80 mg) by mouth daily 90 tablet 1     amLODIPine (NORVASC) 10 MG tablet Take 1 tablet (10 mg) by mouth daily 90 tablet 1     lisinopril (PRINIVIL/ZESTRIL) 20 MG tablet Take 1 tablet (20 mg) by mouth daily 90 tablet 1      irbesartan (AVAPRO) 300 MG tablet Take 1 tablet (300 mg) by mouth daily 90 tablet 1     montelukast (SINGULAIR) 10 MG tablet Take 1 tablet (10 mg) by mouth At Bedtime 30 tablet 1     furosemide (LASIX) 20 MG tablet Take 1 tablet (20 mg) by mouth daily as needed 90 tablet 1     NICORETTE 2 MG lozenge PLACE 1 LOZENGE INSIDE CHEEK Q HOUR PRF SMOKING CESSATION UTD  1     budesonide-formoterol (SYMBICORT) 160-4.5 MCG/ACT Inhaler Inhale 2 puffs into the lungs 2 times daily 3 Inhaler 3     ipratropium - albuterol 0.5 mg/2.5 mg/3 mL (DUONEB) 0.5-2.5 (3) MG/3ML neb solution Take 1 vial (3 mLs) by nebulization every 6 hours 360 mL 0     tiotropium (SPIRIVA HANDIHALER) 18 MCG capsule Inhale contents of one capsule daily. 30 capsule 1     albuterol (VENTOLIN HFA) 108 (90 BASE) MCG/ACT Inhaler Inhale 2 puffs into the lungs every 6 hours 3 Inhaler 3     ketoconazole (NIZORAL) 2 % shampoo Apply to the affected area and wash off after 5 minutes. 120 mL 1     desonide (DESOWEN) 0.05 % cream Apply sparingly to affected area three times daily as needed. 60 g 0     prochlorperazine (COMPAZINE) 5 MG tablet Take 1 tablet (5 mg) by mouth every 6 hours as needed for nausea or vomiting 90 tablet 0     order for DME Equipment being ordered: Nebulizer machine  Length of need-lifetime 1 Device 0     clindamycin-benzoyl peroxide (BENZACLIN) gel Apply topically 2 times daily 100 g 3     chlorhexidine (HIBICLENS) 4 % external liquid Wash ab, groin, thighs daily in shower DO NOT PUT ON FACE 946 mL 3     VITAMIN D, CHOLECALCIFEROL, PO Take 2,000 Units by mouth daily         Soc Hx: reviewed  Fam Hx: reviewed          Roro Low MD  Internal Medicine

## 2017-10-13 ENCOUNTER — OFFICE VISIT (OUTPATIENT)
Dept: SURGERY | Facility: CLINIC | Age: 47
End: 2017-10-13
Payer: MEDICARE

## 2017-10-13 VITALS
OXYGEN SATURATION: 97 % | HEART RATE: 60 BPM | WEIGHT: 201 LBS | BODY MASS INDEX: 34.31 KG/M2 | DIASTOLIC BLOOD PRESSURE: 80 MMHG | HEIGHT: 64 IN | SYSTOLIC BLOOD PRESSURE: 120 MMHG

## 2017-10-13 DIAGNOSIS — K82.8 BILIARY DYSKINESIA: Primary | ICD-10-CM

## 2017-10-13 PROCEDURE — 99214 OFFICE O/P EST MOD 30 MIN: CPT | Performed by: SURGERY

## 2017-10-13 ASSESSMENT — ENCOUNTER SYMPTOMS
VOMITING: 1
DIARRHEA: 1
CHANGE IN BOWEL HABIT: 1
APPETITE CHANGE: 1
NAUSEA: 1

## 2017-10-13 NOTE — PROGRESS NOTES
Chief complaint:  Abdominal pain, right upper quadrant    HPI:  This patient is a 47 year old female who presents with fairly prolonged right upper quadrant pain.  She has also had nausea and has had difficulty eating.  She has been seen by the GI service, who ordered a HIDA scan.  This revealed a gallbladder ejection fraction of 94%.  No stones have been seen on a couple of ultrasounds.    Past Medical History:   has a past medical history of Anxiety state, unspecified; Arthritis; Asthma; Chronic pain; Contact dermatitis and other eczema, due to unspecified cause; COPD (chronic obstructive pulmonary disease) (H); Depressive disorder, not elsewhere classified; Emphysema with chronic bronchitis (H); Esophageal reflux; Family history of ischemic heart disease; Gastro-oesophageal reflux disease; History of emphysema; Hoarseness; HTN, goal below 140/90; Hyperlipidemia LDL goal <130; Pneumonia; 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); and PONV (postoperative nausea and vomiting).    Past Surgical History:  Past Surgical History:   Procedure Laterality Date     ARTHROSCOPY SHOULDER DECOMPRESSION Left 10/21/2015    Procedure: ARTHROSCOPY SHOULDER DECOMPRESSION;  Surgeon: Julien Milian MD;  Location: [facility]     BRONCHIAL THERMOPLASTY N/A 11/14/2014    Procedure: BRONCHIAL THERMOPLASTY;  Surgeon: Ward Whitaker MD;  Location: [facility]     BRONCHIAL THERMOPLASTY N/A 12/19/2014    Procedure: BRONCHIAL THERMOPLASTY;  Surgeon: Ward Whitaker MD;  Location: [facility]     BRONCHIAL THERMOPLASTY N/A 2/6/2015    Procedure: BRONCHIAL THERMOPLASTY;  Surgeon: Ward Whitaker MD;  Location: [facility]     C APPENDECTOMY  at age 17     EXCISE NODE MEDIASTINAL  4/26/2013    Procedure: EXCISE NODE MEDIASTINAL;;  Surgeon: Av Peña MD;  Location:  OR     THORACOSCOPY  4/26/2013    Procedure: THORACOSCOPY;  LEFT VIDEO ASSISTED THORACOSCOPY, RESECTION OF POSTERIOR MEDIASTINAL MASS;  Surgeon: Av Peña  MD Mehrdad;  Location:  OR     TONSILLECTOMY  as a kid     TONSILLECTOMY          Social History:  Social History     Social History     Marital status: Single     Spouse name: N/A     Number of children: N/A     Years of education: N/A     Occupational History     Not on file.     Social History Main Topics     Smoking status: Light Tobacco Smoker     Years: 30.00     Types: Cigarettes     Last attempt to quit: 9/1/2015     Smokeless tobacco: Never Used      Comment: off/on 2 cigs per day     Alcohol use No     Drug use: No     Sexual activity: Yes     Partners: Male     Birth control/ protection: None     Other Topics Concern     Caffeine Concern No     4-5 cans of pop daily     Special Diet No     Exercise No     on hold     Social History Narrative        Family History:  Family History   Problem Relation Age of Onset     HEART DISEASE Mother      murmur, mi     Hypertension Mother      CEREBROVASCULAR DISEASE Mother      Depression Mother      Gallbladder Disease Mother      Asthma Mother      Family History Negative Father      does not know  him     Family History Negative Brother      HEART DISEASE Maternal Grandfather      Asthma Maternal Grandfather      Hypertension Maternal Grandfather      CEREBROVASCULAR DISEASE Maternal Grandfather      DIABETES Maternal Grandfather      Asthma Sister      Hypertension Sister      CEREBROVASCULAR DISEASE Sister      Hypertension Maternal Grandmother      CEREBROVASCULAR DISEASE Maternal Grandmother        Review of Systems:  The 10 point Review of Systems is negative other than noted in the HPI and above.    Physical Exam:  General - This is a well developed, well nourished female in no apparent distress.  HEENT - Normocephalic, atraumatic.  No scleral icterus.  Neck - supple without masses  Lungs - clear to ascultation.    Heart - Regular rate & rhythm without murmur  Abdomen:   soft, non-distended with tenderness noted in the right upper quadrant . no masses  palpated. normal bowel sounds.  Extremities - warm without edema  Neurologic - nonfocal    Relevant labs:  Normal liver function tests.  Recent lipase was minimally elevated at 442.  White blood cell count 9400.    Imaging:  Ultrasounds have been negative for stones or other gallbladder abnormality.  HIDA scan revealed an ejection fraction of 94%.  I feel this is higher than normal, though there is technically no upper limit of normal.    Assessment and Plan:  This patient with biliary dyskinesia and probable hyperkinetic gallbladder.  Dr. Oakes of the GI service recommended laparoscopic cholecystectomy.  I think this is a reasonable thing to proceed with, as long as the patient understands that it is possible that this will not resolve her symptoms.  She does have a known hiatal hernia, as well as fairly significant reflux disease by her history.  I'm hopeful that removal of the gallbladder will resolve some or all of her symptoms, but the patient understands this may not be the case.  If she does not have good resolution of her symptoms, it may be worth having a surgical evaluation for possible hiatal hernia repair.  We have discussed laparoscopic cholecystectomy, along with its risks and complications, in detail.  The patient has agreed to proceed.  We will work on getting surgery scheduled in the near future.    Ney Jerry MD  [facility]    Please route or send letter to:  Primary Care Provider (PCP)

## 2017-10-13 NOTE — LETTER
2017    Re: Swetha Patterson : 1970    Chief complaint:  Abdominal pain, right upper quadrant     HPI:  This patient is a 47 year old female who presents with fairly prolonged right upper quadrant pain.  She has also had nausea and has had difficulty eating.  She has been seen by the GI service, who ordered a HIDA scan.  This revealed a gallbladder ejection fraction of 94%.  No stones have been seen on a couple of ultrasounds.     Past Medical History:  Has a past medical history of Anxiety state, unspecified; Arthritis; Asthma; Chronic pain; Contact dermatitis and other eczema, due to unspecified cause; COPD (chronic obstructive pulmonary disease) (H); Depressive disorder, not elsewhere classified; Emphysema with chronic bronchitis (H); Esophageal reflux; Family history of ischemic heart disease; Gastro-oesophageal reflux disease; History of emphysema; Hoarseness; HTN, goal below 140/90; Hyperlipidemia LDL goal <130; Pneumonia; 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); and PONV (postoperative nausea and vomiting).                         Review of Systems:  The 10 point Review of Systems is negative other than noted in the HPI and above.     Physical Exam:  General - This is a well developed, well nourished female in no apparent distress.  HEENT - Normocephalic, atraumatic.  No scleral icterus.  Neck - supple without masses  Lungs - clear to ascultation.    Heart - Regular rate & rhythm without murmur  Abdomen: soft, non-distended with tenderness noted in the right upper quadrant . no masses palpated. normal bowel sounds.  Extremities - warm without edema  Neurologic - nonfocal     Relevant labs:  Normal liver function tests.  Recent lipase was minimally elevated at 442.  White blood cell count 9400.     Imaging:  Ultrasounds have been negative for stones or other gallbladder abnormality.  HIDA scan revealed an ejection fraction of 94%.  I feel this is higher than normal, though there is  technically no upper limit of normal.     Assessment and Plan:  This patient with biliary dyskinesia and probable hyperkinetic gallbladder.  Dr. Oakes of the GI service recommended laparoscopic cholecystectomy.  I think this is a reasonable thing to proceed with, as long as the patient understands that it is possible that this will not resolve her symptoms.  She does have a known hiatal hernia, as well as fairly significant reflux disease by her history.  I'm hopeful that removal of the gallbladder will resolve some or all of her symptoms, but the patient understands this may not be the case.  If she does not have good resolution of her symptoms, it may be worth having a surgical evaluation for possible hiatal hernia repair.  We have discussed laparoscopic cholecystectomy, along with its risks and complications, in detail. The patient has agreed to proceed.  We will work on getting surgery scheduled in the near future.     Ney Jerry MD

## 2017-10-13 NOTE — MR AVS SNAPSHOT
After Visit Summary   10/13/2017    Swetha Patterson    MRN: [id number]           Patient Information     Date Of Birth          1970        Visit Information        Provider Department      10/13/2017 9:00 AM Ney Jerry MD [facility] General [facility]      Today's Diagnoses     Biliary dyskinesia    -  1       Follow-ups after your visit        Your next 10 appointments already scheduled     Oct 16, 2017  2:00 PM CDT   Pre-Op physical with Roro Chawla MD   [facility] ([facility])    [address]  [facility] [address]   [phone]            Oct 19, 2017   Procedure with Ney Jerry MD   [facility] PeriOp Services (--)    [address]  [facility] [address]   [phone]            Oct 19, 2017  8:45 AM CDT   [facility] Same Day Surgery with Ney Jerry MD, Kerry Rock PA-C   [facility] ([facility]    [facility] ([facility])   [phone]            Oct 25, 2017 12:40 PM CDT   (Arrive by 12:25 PM)   CT CHEST W/O CONTRAST with UCCT1   [facility] ([facility])    [address] [address] [phone]           Please bring any scans or X-rays taken at other hospitals, if similar tests were done. Also bring a list of your medicines, including vitamins, minerals and over-the-counter drugs. It is safest to leave personal items at home.  Be sure to tell your doctor:   If you have any allergies.   If there s any chance you are pregnant.   If you are breastfeeding.   If you have any special needs.  You do not need to do anything special to prepare.  Please wear loose clothing, such as a sweat suit or jogging clothes. Avoid snaps, zippers and other metal. We may ask you to  undress and put on a hospital gown.            Oct 25, 2017  1:00 PM CDT   FULL PULMONARY FUNCTION with UC PFL D   [facility] Pulmonary Function Testing ([facility])    [address]  [address] [facility] [address]   [phone]            Oct 25, 2017  2:00 PM CDT   (Arrive by 1:45 PM)   Return Visit with Nita Williamson MD   [facility] ([facility])    [address] [place] [address]  [address] [facility] [address]   [phone]            Oct 26, 2017 10:15 AM CDT   Return Visit with Milena Joseph NP   [facility] ([facility])    [address]  [address]  [facility] [id number] [phone]            Nov 09, 2017  1:00 PM CST   (Arrive by 12:30 PM)   NEW NECK with Agustin Palomares MD   [facility] ([facility])    [address] [place] [address]  [address] [facility] [address] [phone]              Who to contact     If you have questions or need follow up information about today's clinic visit or your schedule please contact [facility] directly at [phone].  Normal or non-critical lab and imaging results will be communicated to you by [employer], letter or phone within 4 business days after the clinic has received the results. If you do not hear from us within 7 days, please contact the clinic through MyChart or phone. If you have a critical or abnormal lab result, we will notify you by phone as soon as possible.  Submit refill requests through [employer] or call your pharmacy and they will forward the refill request to us. Please allow 3 business days for your refill to be completed.          Additional Information About Your Visit        [employer] Information     [employer] lets you send messages to your doctor, view your test results, renew your prescriptions, schedule appointments and more. To sign up, go to  [email or web address] . Click on \"Log in\" on the left side of the screen, which will take you to the Welcome page. Then click on \"Sign up Now\" on the right side of the page.     You will be asked to enter the access code listed below, as well as some personal information. Please follow the directions to create your username and password.     Your access code is: [email or web address]  Expires: 2018  7:42 PM     Your access code will  in 90 days. If you need help or a new code, please call your [place] clinic or [phone].        Care EveryWhere ID     This is your Care EveryWhere ID. This could be used by other organizations to access your [place] medical records  [id number]        Your Vitals Were     Pulse Height Last Period Pulse Oximetry Breastfeeding? BMI (Body Mass Index)    60 5' 4\" (1.626 m) 04/15/2016 97% No 34.5 kg/m2       Blood Pressure from Last 3 Encounters:   10/13/17 120/80   10/12/17 138/84   10/06/17 128/74    Weight from Last 3 Encounters:   10/13/17 201 lb (91.2 kg)   10/12/17 201 lb 8 oz (91.4 kg)   10/06/17 204 lb 1.6 oz (92.6 kg)              Today, you had the following     No orders found for display       Primary Care Provider Office Phone # Fax #    Roro Janine Chawla [phone]       [address] [facility] [address]        Equal Access to Services     [facility]: Hadii aad ku hadasho Soomaali, waaxda luqadaha, qaybta kaalmada adejason, caryl cowan . [facility] [phone].    ATENCIÓN: Si habla español, tiene a vargas disposición servicios gratuitos de asistencia lingüística. Llame al [phone].    We comply with applicable federal civil rights laws and [place] laws. We do not discriminate on the basis of race, color, national origin, age, disability, sex, sexual orientation, or gender identity.            Thank you!     Thank you for choosing [facility]  for your care. Our goal is always " to provide you with excellent care. Hearing back from our patients is one way we can continue to improve our services. Please take a few minutes to complete the written survey that you may receive in the mail after your visit with us. Thank you!             Your Updated Medication List - Protect others around you: Learn how to safely use, store and throw away your medicines at www.disposemymeds.org.          This list is accurate as of: 10/13/17 11:51 AM.  Always use your most recent med list.                   Brand Name Dispense Instructions for use Diagnosis    albuterol 108 (90 BASE) MCG/ACT Inhaler    VENTOLIN HFA    3 Inhaler    Inhale 2 puffs into the lungs every 6 hours    Shortness of breath       amLODIPine 10 MG tablet    NORVASC    90 tablet    Take 1 tablet (10 mg) by mouth daily    HTN, goal below 140/90       atorvastatin 80 MG tablet    LIPITOR    90 tablet    Take 1 tablet (80 mg) by mouth daily    Family history of ischemic heart disease, Hyperlipidemia LDL goal <130       budesonide-formoterol 160-4.5 MCG/ACT Inhaler    SYMBICORT    3 Inhaler    Inhale 2 puffs into the lungs 2 times daily    Uncomplicated asthma, unspecified asthma severity       cetirizine HCl 10 MG Caps     90 capsule    Take 1 capsule by mouth daily    Chronic rhinitis       chlorhexidine 4 % liquid    HIBICLENS    946 mL    Wash ab, groin, thighs daily in shower DO NOT PUT ON FACE    Hidradenitis suppurativa       clindamycin-benzoyl peroxide gel    BENZACLIN    100 g    Apply topically 2 times daily    Hidradenitis suppurativa       desonide 0.05 % cream    DESOWEN    60 g    Apply sparingly to affected area three times daily as needed.    Localized pruritus       diazepam 10 MG tablet    VALIUM    90 tablet    Take 1 tablet (10 mg) by mouth every 8 hours as needed for anxiety or sleep For sleep, anxiety, and muscle tension.    Chest wall pain, Muscle pain, Anxiety       doxepin 10 MG capsule    SINEquan    30 capsule    Take  1 capsule (10 mg) by mouth At Bedtime    Persistent insomnia       escitalopram 20 MG tablet    LEXAPRO    90 tablet    Take 1 tablet (20 mg) by mouth daily Increased dose.    Anxiety       estradiol 0.1 MG/GM cream    ESTRACE VAGINAL    42.5 g    1 gm pv nightly at bedtime for 2 weeks, then twice a week at bedtime for maintenance.    Atrophic vaginitis       fluticasone 50 MCG/ACT spray    FLONASE    16 g    Spray 2 sprays into both nostrils daily    Severe chronic obstructive pulmonary disease (H)       furosemide 20 MG tablet    LASIX    90 tablet    Take 1 tablet (20 mg) by mouth daily as needed    Bilateral leg edema       hydrOXYzine 50 MG tablet    ATARAX    120 tablet    Take 1 tablet (50 mg) by mouth 4 times daily as needed for anxiety Increased dose. For anxiety    Anxiety       ipratropium - albuterol 0.5 mg/2.5 mg/3 mL 0.5-2.5 (3) MG/3ML neb solution    DUONEB    360 mL    Take 1 vial (3 mLs) by nebulization every 6 hours    Uncomplicated asthma, unspecified asthma severity       irbesartan 300 MG tablet    AVAPRO    90 tablet    Take 1 tablet (300 mg) by mouth daily    Essential hypertension with goal blood pressure less than 140/90       ketoconazole 2 % shampoo    NIZORAL    120 mL    Apply to the affected area and wash off after 5 minutes.    Localized pruritus       lisinopril 20 MG tablet    PRINIVIL/ZESTRIL    90 tablet    Take 1 tablet (20 mg) by mouth daily    Essential hypertension with goal blood pressure less than 140/90       montelukast 10 MG tablet    SINGULAIR    30 tablet    Take 1 tablet (10 mg) by mouth At Bedtime    Dust allergy, Severe chronic obstructive pulmonary disease (H)       NICORETTE 2 MG lozenge   Generic drug:  nicotine polacrilex      PLACE 1 LOZENGE INSIDE CHEEK Q HOUR PRF SMOKING CESSATION UTD        omeprazole 20 MG CR capsule    priLOSEC    30 capsule    Take 1 capsule (20 mg) by mouth 2 times daily    Acute gastritis, presence of bleeding unspecified, unspecified  gastritis type       ondansetron 8 MG tablet    ZOFRAN    30 tablet    Take 0.5-1 tablets (4-8 mg) by mouth every 8 hours as needed for nausea    Nausea       order for DME     1 Device    Equipment being ordered: Nebulizer machine Length of need-lifetime    Chronic lung disease, Cough, Severe persistent asthma with acute exacerbation       oxyCODONE 5 MG IR tablet    ROXICODONE    35 tablet    Take 1 tablet (5 mg) by mouth every 6 hours as needed for pain    Abdominal pain, epigastric       prochlorperazine 5 MG tablet    COMPAZINE    90 tablet    Take 1 tablet (5 mg) by mouth every 6 hours as needed for nausea or vomiting    Nausea       ranitidine 75 MG tablet    ZANTAC    90 tablet    Take 2 tablets (150 mg) by mouth daily    Uncomplicated asthma, unspecified asthma severity       tiotropium 18 MCG capsule    SPIRIVA HANDIHALER    30 capsule    Inhale contents of one capsule daily.    Other emphysema (H)       tiZANidine 4 MG tablet    ZANAFLEX     TK 1 T PO  TID PRF MUSCLE SPASM        varenicline 0.5 MG X 11 & 1 MG X 42 tablet    CHANTIX STARTING MONTH CHAPITO    53 tablet    Take 0.5 mg tab daily for 3 days, then 0.5 mg tab twice daily for 4 days, then 1 mg twice daily.    Encounter for smoking cessation counseling       venlafaxine 37.5 MG 24 hr capsule    EFFEXOR-XR    46 capsule    Take 1 capsule daily for 14 days, then take 2 capsules daily.    Menopausal syndrome (hot flashes)       VITAMIN D (CHOLECALCIFEROL) PO      Take 2,000 Units by mouth daily

## 2017-10-13 NOTE — PROGRESS NOTES
HPI      ROS (Review of Systems):      Positive for appetite change and hoarseness.   Respiratory: Positive for asthma.    Cardiovascular: Positive for hyperlipidemia.   GASTROINTESTINAL: Positive for nausea, vomiting, diarrhea and change in bowel habit.   MUSCULOSKELETAL: Positive for back pain.          Physical Exam

## 2017-10-16 ENCOUNTER — OFFICE VISIT (OUTPATIENT)
Dept: INTERNAL MEDICINE | Facility: CLINIC | Age: 47
End: 2017-10-16
Payer: MEDICARE

## 2017-10-16 VITALS
TEMPERATURE: 98.2 F | BODY MASS INDEX: 34.43 KG/M2 | OXYGEN SATURATION: 100 % | DIASTOLIC BLOOD PRESSURE: 68 MMHG | SYSTOLIC BLOOD PRESSURE: 104 MMHG | HEART RATE: 91 BPM | WEIGHT: 201.7 LBS | HEIGHT: 64 IN

## 2017-10-16 DIAGNOSIS — Z01.818 PRE-OPERATIVE EXAMINATION: Primary | ICD-10-CM

## 2017-10-16 DIAGNOSIS — F11.20 CONTINUOUS OPIOID DEPENDENCE (H): Chronic | ICD-10-CM

## 2017-10-16 DIAGNOSIS — J44.9 SEVERE CHRONIC OBSTRUCTIVE PULMONARY DISEASE (H): Chronic | ICD-10-CM

## 2017-10-16 DIAGNOSIS — I10 ESSENTIAL HYPERTENSION WITH GOAL BLOOD PRESSURE LESS THAN 140/90: Chronic | ICD-10-CM

## 2017-10-16 DIAGNOSIS — R10.13 ABDOMINAL PAIN, EPIGASTRIC: ICD-10-CM

## 2017-10-16 DIAGNOSIS — E78.5 HYPERLIPIDEMIA LDL GOAL <130: Chronic | ICD-10-CM

## 2017-10-16 PROCEDURE — 99215 OFFICE O/P EST HI 40 MIN: CPT | Performed by: INTERNAL MEDICINE

## 2017-10-16 RX ORDER — OXYCODONE HYDROCHLORIDE 5 MG/1
5 TABLET ORAL EVERY 6 HOURS PRN
Qty: 35 TABLET | Refills: 0 | Status: SHIPPED | OUTPATIENT
Start: 2017-10-19 | End: 2017-11-07 | Stop reason: DRUGHIGH

## 2017-10-16 NOTE — PROGRESS NOTES
*Pre-Op Exam  *Refill Request-She will be due for refill around the time Dr. Low will be out next week, wondering if she can have post-dated Rx's to bring down to the pharmacy?    [facility]  [address]  [facility] [address]  [phone]  Dept: [phone]    PRE-OP EVALUATION:  Today's date: 10/16/2017    Swetha Patterson (: 1970) presents for pre-operative evaluation assessment as requested by Dr. Ney Jerry.  She requires evaluation and anesthesia risk assessment prior to undergoing surgery/procedure for treatment of abd pain  .  Proposed procedure: Laparoscopic cholecystectomy    Date of Surgery/ Procedure: 10/19/17  Time of Surgery/ Procedure: 8:40AM  Hospital/Surgical Facility: [facility]  Primary Physician: Roro Chawla  Type of Anesthesia Anticipated: General    Patient has a Health Care Directive or Living Will:  NO, information given to her and she will try to complete it before then.     Preop Questions 10/16/2017   1.  Do you have a history of heart attack, stroke, stent, bypass or surgery on an artery in the head, neck, heart or legs? No   2.  Do you ever have any pain or discomfort in your chest? No   3.  Do you have a history of  Heart Failure? No   4.   Are you troubled by shortness of breath when:  walking on a level surface, or up a slight hill, or at night? YES - COPD - stable    5.  Do you currently have a cold, bronchitis or other respiratory infection? No   6.  Do you have a cough, shortness of breath, or wheezing? YES - COPD - stable    7.  Do you sometimes get pains in the calves of your legs when you walk? No   8. Do you or anyone in your family have previous history of blood clots? No   9.  Do you or does anyone in your family have a serious bleeding problem such as prolonged bleeding following surgeries or cuts? No   10. Have you ever had problems with anemia or been told to take iron pills? No   11. Have  you had any abnormal blood loss such as black, tarry or bloody stools, or abnormal vaginal bleeding? No   12. Have you ever had a blood transfusion? No   13. Have you or any of your relatives ever had problems with anesthesia? No   14. Do you have sleep apnea, excessive snoring or daytime drowsiness? No   15. Do you have any prosthetic heart valves? No   16. Do you have prosthetic joints? No   17. Is there any chance that you may be pregnant? No       CC:  Preop for multiple medical problems.    HPI:    The patient is scheduled for gallbladder surgery  with Dr. Jerry on  10/19/2017  No other acute complaints.    Assessment:  1. V72.83H Preop general physical exam _ I do not see any major contraindications for the patient to go through the scheduled surgery.    The proposed surgical procedure is considered  INTERMEDIATE, surgery risk.    For above listed surgery and anesthesia, Patient is at MODERATE, risk for surgery/procedure and perioperative/procedure complications.        ECG:    sinus rhythm, no changes suggestive for ischemia      (R10.13) Abdominal pain, epigastric  Comment:   Plan: surgery, as above     (J44.9) Chr Lung Disease - managed by the pulm dept ([facility])^^^  Comment: stable   Plan: Continue same meds, same doses for now     (F11.20) Continuous opioid dependence ([facility]) opioid for chr cough and chest pain ^^^^  Comment: chronic Oxycodone 10 times a day. For the last 2 weeks she has been taking 15 mg 5 times a day   Plan: Continue same meds, same doses for now     (E78.5) Hyperlipidemia LDL goal <130  Comment:   Plan: Continue same meds, same doses for now     (I10) Essential hypertension with goal blood pressure less than 140/90  Comment: Controlled    Plan: Continue same meds, same doses for now        Plan:  1. In the morning of the surgery day you take with a sip of water just these meds: Lisinopril and Omeprazole The rest of the meds you resume after surgery.  2. Follow up on Oct 24 and we will  decide about the Oxycodine dose      ROS:   General: Negative for fever, chills, major weight changes, fatigue  Skin: Negative for rashes, abnormal spots  Eyes: Negative for blurred or double vision  ENT/mouth: Negative for sinuses discomfort, earache, sore throat  Respiratory: Negative for cough, wheezes, chronic lung disease  Cardiovascular: Negative for palpitations, leg edema, Pos for chr SOB and cough  Gastrointestinal: Negative for vomiting,  heartburn, blood in stool, diarrhea, constipation. Pos for abd pain as above   Genitourinary: Negative for urinary frequency, blood in urine, history of kidney stones  Neuro: Negative for headaches, numbness, tingling, weakness in arms or legs, history of seizure, recent syncope  Psychiatry: Negative for depression, anxiety, suicidal thoughts  Endo: Negative for known thyroid disease, diabetes.  Hemato/Lymph: Negative for nodes, easy bleeding, history of DVT, blood transfusion  Musculoskeletal: Negative for joint swelling, back pain      Past Medical History:   Diagnosis Date     Anxiety state, unspecified      Arthritis     right hip arthritis     Asthma      Chronic pain     back pain from cyst     Contact dermatitis and other eczema, due to unspecified cause      COPD (chronic obstructive pulmonary disease) (H)      Depressive disorder, not elsewhere classified      Emphysema with chronic bronchitis (H)      Esophageal reflux      Family history of ischemic heart disease      Gastro-oesophageal reflux disease      History of emphysema      Hoarseness      HTN, goal below 140/90      Hyperlipidemia LDL goal <130      Pneumonia      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)      PONV (postoperative nausea and vomiting)       Past Surgical History:   Procedure Laterality Date     ARTHROSCOPY SHOULDER DECOMPRESSION Left 10/21/2015    Procedure: ARTHROSCOPY SHOULDER DECOMPRESSION;  Surgeon: Julien Milian MD;  Location: [facility]     BRONCHIAL THERMOPLASTY N/A 11/14/2014     Procedure: BRONCHIAL THERMOPLASTY;  Surgeon: Ward Whitaker MD;  Location: [facility]     BRONCHIAL THERMOPLASTY N/A 12/19/2014    Procedure: BRONCHIAL THERMOPLASTY;  Surgeon: Ward Whitaker MD;  Location: [facility]     BRONCHIAL THERMOPLASTY N/A 2/6/2015    Procedure: BRONCHIAL THERMOPLASTY;  Surgeon: Ward Whitaker MD;  Location: [facility]     C APPENDECTOMY  at age 17     EXCISE NODE MEDIASTINAL  4/26/2013    Procedure: EXCISE NODE MEDIASTINAL;;  Surgeon: Av Peña MD;  Location:  OR     THORACOSCOPY  4/26/2013    Procedure: THORACOSCOPY;  LEFT VIDEO ASSISTED THORACOSCOPY, RESECTION OF POSTERIOR MEDIASTINAL MASS;  Surgeon: Av ePña MD;  Location:  OR     TONSILLECTOMY  as a kid     TONSILLECTOMY          PSHx: No complications with prior surgeries or anesthesias.    Soc Hx: No daily alcohol, no smoking    Family History   Problem Relation Age of Onset     HEART DISEASE Mother      murmur, mi     Hypertension Mother      CEREBROVASCULAR DISEASE Mother      Depression Mother      Gallbladder Disease Mother      Asthma Mother      Family History Negative Father      does not know  him     Family History Negative Brother      HEART DISEASE Maternal Grandfather      Asthma Maternal Grandfather      Hypertension Maternal Grandfather      CEREBROVASCULAR DISEASE Maternal Grandfather      DIABETES Maternal Grandfather      Asthma Sister      Hypertension Sister      CEREBROVASCULAR DISEASE Sister      Hypertension Maternal Grandmother      CEREBROVASCULAR DISEASE Maternal Grandmother         All: reviewed    Current Outpatient Prescriptions   Medication Sig Dispense Refill     oxyCODONE (ROXICODONE) 5 MG IR tablet Take 1 tablet (5 mg) by mouth every 6 hours as needed for pain 35 tablet 0     hydrOXYzine (ATARAX) 50 MG tablet Take 1 tablet (50 mg) by mouth 4 times daily as needed for anxiety Increased dose. For anxiety 120 tablet 0     tiZANidine (ZANAFLEX) 4 MG tablet TK 1 T  PO  TID PRF MUSCLE SPASM  0     diazepam (VALIUM) 10 MG tablet Take 1 tablet (10 mg) by mouth every 8 hours as needed for anxiety or sleep For sleep, anxiety, and muscle tension. 90 tablet 0     ondansetron (ZOFRAN) 8 MG tablet Take 0.5-1 tablets (4-8 mg) by mouth every 8 hours as needed for nausea 30 tablet 0     omeprazole (PRILOSEC) 20 MG CR capsule Take 1 capsule (20 mg) by mouth 2 times daily 30 capsule 0     varenicline (CHANTIX STARTING MONTH PAK) 0.5 MG X 11 & 1 MG X 42 tablet Take 0.5 mg tab daily for 3 days, then 0.5 mg tab twice daily for 4 days, then 1 mg twice daily. 53 tablet 0     escitalopram (LEXAPRO) 20 MG tablet Take 1 tablet (20 mg) by mouth daily Increased dose. 90 tablet 1     doxepin (SINEQUAN) 10 MG capsule Take 1 capsule (10 mg) by mouth At Bedtime 30 capsule 1     venlafaxine (EFFEXOR-XR) 37.5 MG 24 hr capsule Take 1 capsule daily for 14 days, then take 2 capsules daily. 46 capsule 0     estradiol (ESTRACE VAGINAL) 0.1 MG/GM cream 1 gm pv nightly at bedtime for 2 weeks, then twice a week at bedtime for maintenance. 42.5 g 6     fluticasone (FLONASE) 50 MCG/ACT spray Spray 2 sprays into both nostrils daily 16 g 11     ranitidine (ZANTAC) 75 MG tablet Take 2 tablets (150 mg) by mouth daily 90 tablet 1     cetirizine HCl 10 MG CAPS Take 1 capsule by mouth daily 90 capsule 3     atorvastatin (LIPITOR) 80 MG tablet Take 1 tablet (80 mg) by mouth daily 90 tablet 1     amLODIPine (NORVASC) 10 MG tablet Take 1 tablet (10 mg) by mouth daily 90 tablet 1     lisinopril (PRINIVIL/ZESTRIL) 20 MG tablet Take 1 tablet (20 mg) by mouth daily 90 tablet 1     irbesartan (AVAPRO) 300 MG tablet Take 1 tablet (300 mg) by mouth daily 90 tablet 1     montelukast (SINGULAIR) 10 MG tablet Take 1 tablet (10 mg) by mouth At Bedtime 30 tablet 1     furosemide (LASIX) 20 MG tablet Take 1 tablet (20 mg) by mouth daily as needed 90 tablet 1     NICORETTE 2 MG lozenge PLACE 1 LOZENGE INSIDE CHEEK Q HOUR PRF SMOKING  "CESSATION UTD  1     budesonide-formoterol (SYMBICORT) 160-4.5 MCG/ACT Inhaler Inhale 2 puffs into the lungs 2 times daily 3 Inhaler 3     ipratropium - albuterol 0.5 mg/2.5 mg/3 mL (DUONEB) 0.5-2.5 (3) MG/3ML neb solution Take 1 vial (3 mLs) by nebulization every 6 hours 360 mL 0     tiotropium (SPIRIVA HANDIHALER) 18 MCG capsule Inhale contents of one capsule daily. 30 capsule 1     albuterol (VENTOLIN HFA) 108 (90 BASE) MCG/ACT Inhaler Inhale 2 puffs into the lungs every 6 hours 3 Inhaler 3     ketoconazole (NIZORAL) 2 % shampoo Apply to the affected area and wash off after 5 minutes. 120 mL 1     desonide (DESOWEN) 0.05 % cream Apply sparingly to affected area three times daily as needed. 60 g 0     prochlorperazine (COMPAZINE) 5 MG tablet Take 1 tablet (5 mg) by mouth every 6 hours as needed for nausea or vomiting 90 tablet 0     order for DME Equipment being ordered: Nebulizer machine  Length of need-lifetime 1 Device 0     clindamycin-benzoyl peroxide (BENZACLIN) gel Apply topically 2 times daily 100 g 3     chlorhexidine (HIBICLENS) 4 % external liquid Wash ab, groin, thighs daily in shower DO NOT PUT ON FACE 946 mL 3     VITAMIN D, CHOLECALCIFEROL, PO Take 2,000 Units by mouth daily          Physical exam:  Blood pressure 104/68, pulse 91, temperature 98.2  F (36.8  C), temperature source Oral, height 5' 4\" (1.626 m), weight 201 lb 11.2 oz (91.5 kg), last menstrual period 04/15/2016, SpO2 100 %, not currently breastfeeding.    NAD, appears comfortable  Skin clear, no rashes  HEENT: PERRLA, EOMI, anicteric sclera, pink conjunctiva, external ears appear normal, bilateral tympanic membranes clinically normal, oropharynx normal color.  Neck: supple, no JVD, no thyroidmegaly  Lymph nodes non palpable in the cervical, supraclavicular   Chest: clear to auscultation with good respiratory effort  Cardiac: S1S2, RRR, no mgr appreciated  Abdomen: soft, epig  tender, not distended, audible bowel sound, no " hepatosplenomegaly, no palpable masses, no abdominal bruits  Extremities: no cyanosis, clubbing or edema.   Neuro: A, Ox3, no focal signs.      Roro Low MD  Internal Medicine       MEDICAL HISTORY:                                                    Patient Active Problem List    Diagnosis Date Noted     Essential hypertension with goal blood pressure less than 140/90 11/01/2016     Priority: Medium     DIAZ (nonalcoholic steatohepatitis) 06/17/2016     Priority: Medium     Chr Lung Disease - managed by the pulm dept ([facility])^^^ 04/15/2016     Priority: Medium     Continuous opioid dependence (HCC) opioid for chr cough and chest pain ^^^^ 04/15/2016     Priority: Medium     Chr PAIN - Ctr SUBSTANCE AGREEMENT - SIGNED 10/27/2015     Priority: Medium     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) 10/19/2015     Priority: Medium     Anxiety 10/13/2015     Priority: Medium     Gastroesophageal reflux disease without esophagitis 10/13/2015     Priority: Medium     Hyperlipidemia LDL goal <130      Priority: Medium     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)      Priority: Medium     Family history of ischemic heart disease      Priority: Medium     Mediastinal cyst 04/12/2013     Priority: Medium      Past Medical History:   Diagnosis Date     Anxiety state, unspecified      Arthritis     right hip arthritis     Asthma      Chronic pain     back pain from cyst     Contact dermatitis and other eczema, due to unspecified cause      COPD (chronic obstructive pulmonary disease) (H)      Depressive disorder, not elsewhere classified      Emphysema with chronic bronchitis (H)      Esophageal reflux      Family history of ischemic heart disease      Gastro-oesophageal reflux disease      History of emphysema      Hoarseness      HTN, goal below 140/90      Hyperlipidemia LDL goal <130      Pneumonia      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)      PONV (postoperative nausea and vomiting)      Past Surgical History:    Procedure Laterality Date     ARTHROSCOPY SHOULDER DECOMPRESSION Left 10/21/2015    Procedure: ARTHROSCOPY SHOULDER DECOMPRESSION;  Surgeon: Julein Milian MD;  Location: [facility] OR     BRONCHIAL THERMOPLASTY N/A 11/14/2014    Procedure: BRONCHIAL THERMOPLASTY;  Surgeon: Ward Whitaker MD;  Location: [facility]     BRONCHIAL THERMOPLASTY N/A 12/19/2014    Procedure: BRONCHIAL THERMOPLASTY;  Surgeon: Ward Whitaker MD;  [facility]: [facility]     BRONCHIAL THERMOPLASTY N/A 2/6/2015    Procedure: BRONCHIAL THERMOPLASTY;  Surgeon: Ward Whitaker MD;  Location: [facility]     C APPENDECTOMY  at age 17     EXCISE NODE MEDIASTINAL  4/26/2013    Procedure: EXCISE NODE MEDIASTINAL;;  Surgeon: Av Peña MD;  Location:  OR     THORACOSCOPY  4/26/2013    Procedure: THORACOSCOPY;  LEFT VIDEO ASSISTED THORACOSCOPY, RESECTION OF POSTERIOR MEDIASTINAL MASS;  Surgeon: Av Peña MD;  Location:  OR     TONSILLECTOMY  as a kid     TONSILLECTOMY       Current Outpatient Prescriptions   Medication Sig Dispense Refill     oxyCODONE (ROXICODONE) 5 MG IR tablet Take 1 tablet (5 mg) by mouth every 6 hours as needed for pain 35 tablet 0     hydrOXYzine (ATARAX) 50 MG tablet Take 1 tablet (50 mg) by mouth 4 times daily as needed for anxiety Increased dose. For anxiety 120 tablet 0     tiZANidine (ZANAFLEX) 4 MG tablet TK 1 T PO  TID PRF MUSCLE SPASM  0     diazepam (VALIUM) 10 MG tablet Take 1 tablet (10 mg) by mouth every 8 hours as needed for anxiety or sleep For sleep, anxiety, and muscle tension. 90 tablet 0     ondansetron (ZOFRAN) 8 MG tablet Take 0.5-1 tablets (4-8 mg) by mouth every 8 hours as needed for nausea 30 tablet 0     omeprazole (PRILOSEC) 20 MG CR capsule Take 1 capsule (20 mg) by mouth 2 times daily 30 capsule 0     varenicline (CHANTIX STARTING MONTH PAK) 0.5 MG X 11 & 1 MG X 42 tablet Take 0.5 mg tab daily for 3 days, then 0.5 mg tab twice daily for 4 days, then 1 mg twice daily.  53 tablet 0     escitalopram (LEXAPRO) 20 MG tablet Take 1 tablet (20 mg) by mouth daily Increased dose. 90 tablet 1     doxepin (SINEQUAN) 10 MG capsule Take 1 capsule (10 mg) by mouth At Bedtime 30 capsule 1     venlafaxine (EFFEXOR-XR) 37.5 MG 24 hr capsule Take 1 capsule daily for 14 days, then take 2 capsules daily. 46 capsule 0     estradiol (ESTRACE VAGINAL) 0.1 MG/GM cream 1 gm pv nightly at bedtime for 2 weeks, then twice a week at bedtime for maintenance. 42.5 g 6     fluticasone (FLONASE) 50 MCG/ACT spray Spray 2 sprays into both nostrils daily 16 g 11     ranitidine (ZANTAC) 75 MG tablet Take 2 tablets (150 mg) by mouth daily 90 tablet 1     cetirizine HCl 10 MG CAPS Take 1 capsule by mouth daily 90 capsule 3     atorvastatin (LIPITOR) 80 MG tablet Take 1 tablet (80 mg) by mouth daily 90 tablet 1     amLODIPine (NORVASC) 10 MG tablet Take 1 tablet (10 mg) by mouth daily 90 tablet 1     lisinopril (PRINIVIL/ZESTRIL) 20 MG tablet Take 1 tablet (20 mg) by mouth daily 90 tablet 1     irbesartan (AVAPRO) 300 MG tablet Take 1 tablet (300 mg) by mouth daily 90 tablet 1     montelukast (SINGULAIR) 10 MG tablet Take 1 tablet (10 mg) by mouth At Bedtime 30 tablet 1     furosemide (LASIX) 20 MG tablet Take 1 tablet (20 mg) by mouth daily as needed 90 tablet 1     NICORETTE 2 MG lozenge PLACE 1 LOZENGE INSIDE CHEEK Q HOUR PRF SMOKING CESSATION UTD  1     budesonide-formoterol (SYMBICORT) 160-4.5 MCG/ACT Inhaler Inhale 2 puffs into the lungs 2 times daily 3 Inhaler 3     ipratropium - albuterol 0.5 mg/2.5 mg/3 mL (DUONEB) 0.5-2.5 (3) MG/3ML neb solution Take 1 vial (3 mLs) by nebulization every 6 hours 360 mL 0     tiotropium (SPIRIVA HANDIHALER) 18 MCG capsule Inhale contents of one capsule daily. 30 capsule 1     albuterol (VENTOLIN HFA) 108 (90 BASE) MCG/ACT Inhaler Inhale 2 puffs into the lungs every 6 hours 3 Inhaler 3     ketoconazole (NIZORAL) 2 % shampoo Apply to the affected area and wash off after 5  minutes. 120 mL 1     desonide (DESOWEN) 0.05 % cream Apply sparingly to affected area three times daily as needed. 60 g 0     prochlorperazine (COMPAZINE) 5 MG tablet Take 1 tablet (5 mg) by mouth every 6 hours as needed for nausea or vomiting 90 tablet 0     order for DME Equipment being ordered: Nebulizer machine  Length of need-lifetime 1 Device 0     clindamycin-benzoyl peroxide (BENZACLIN) gel Apply topically 2 times daily 100 g 3     chlorhexidine (HIBICLENS) 4 % external liquid Wash ab, groin, thighs daily in shower DO NOT PUT ON FACE 946 mL 3     VITAMIN D, CHOLECALCIFEROL, PO Take 2,000 Units by mouth daily       OTC products: None, except as noted above    Allergies   Allergen Reactions     Codeine      vomiting     Hydrocodone Nausea and Vomiting     Sulfa Drugs      Nausea     Gabapentin Rash      Latex Allergy: NO    Social History   Substance Use Topics     Smoking status: Light Tobacco Smoker     Years: 30.00     Types: Cigarettes     Last attempt to quit: 9/1/2015     Smokeless tobacco: Never Used      Comment: off/on 2 cigs per day     Alcohol use No     History   Drug Use No       Recent Labs   Lab Test  09/26/17   1052  09/19/17   1630  03/27/17   1131   04/26/13   0752   03/21/13   1002   HGB  14.2  15.1  14.2   < >  13.7   < >   --    PLT  362  414  320   < >  336   < >   --    INR   --    --   0.90   --   0.92   --    --    NA  139  137  140   < >  137   < >   --    POTASSIUM  4.1  3.9  4.4   < >  4.0   < >   --    CR  0.72  0.82  0.70   < >  0.78   < >   --    A1C   --    --    --    --    --    --   5.4    < > = values in this interval not displayed.

## 2017-10-16 NOTE — MR AVS SNAPSHOT
After Visit Summary   10/16/2017    Swetha Patterson    MRN: [id number]           Patient Information     Date Of Birth          1970        Visit Information        Provider Department      10/16/2017 2:00 PM Roro Chawla MD [facility]        Today's Diagnoses     Pre-operative examination    -  1    Abdominal pain, epigastric        Chr Lung Disease - managed by the pulm dept ([facility])^^^        Continuous opioid dependence ([facility]) opioid for chr cough and chest pain ^^^^        Hyperlipidemia LDL goal <130        Essential hypertension with goal blood pressure less than 140/90          Care Instructions    Plan:  1. In the morning of the surgery day you take with a sip of water just these meds: Lisinopril and Omeprazole The rest of the meds you resume after surgery.  2. Follow up on Oct 24 and we will decide about the Oxycodine dose          Follow-ups after your visit        Your next 10 appointments already scheduled     Oct 19, 2017   Procedure with Ney Jerry MD   [facility] ([facility]    [address] [facility] [address]   [phone]            Oct 19, 2017  8:45 AM CDT   [facility] Same Day Surgery with Ney Jerry MD, Kerry Rock PA-C   [facility] ([facility])    [facility] ([facility])   [phone]            Oct 25, 2017 12:40 PM CDT   (Arrive by 12:25 PM)   CT CHEST W/O CONTRAST with UCCT1   [facility] ([facility])    [address] [address] [phone]           Please bring any scans or X-rays taken at other hospitals, if similar tests were done. Also bring a list of your medicines, including vitamins, minerals and over-the-counter drugs. It is safest to leave personal items at home.  Be sure to tell your doctor:   If you have any  allergies.   If there s any chance you are pregnant.   If you are breastfeeding.   If you have any special needs.  You do not need to do anything special to prepare.  Please wear loose clothing, such as a sweat suit or jogging clothes. Avoid snaps, zippers and other metal. We may ask you to undress and put on a hospital gown.            Oct 25, 2017  1:00 PM CDT   FULL PULMONARY FUNCTION with UC PFL D   [facility] Pulmonary Function Testing ([facility])    [address] [place] [address]  3rd [facility] [address]   [phone]            Oct 25, 2017  2:00 PM CDT   (Arrive by 1:45 PM)   Return Visit with Nita Williamson MD   [facility] ([facility])    [address] [place] [address]   [phone]            Oct 26, 2017 10:15 AM CDT   Return Visit with Milena Joseph NP   [facility] ([facility])    [address]  [address]  [facility] [id number] [phone]            Nov 09, 2017  1:00 PM CST   (Arrive by 12:30 PM)   NEW NECK with Agustin Palomares MD   [facility] ([facility])    [address] [place] [id number] [phone]              Who to contact     If you have questions or need follow up information about today's clinic visit or your schedule please contact [facility] directly at [phone].  Normal or non-critical lab and imaging results will be communicated to you by MyChart, letter or phone within 4 business days after the clinic has received the results. If you do not hear from us within 7 days, please contact the clinic through MyChart or phone. If you have a critical or abnormal lab result, we will notify you by phone as soon as possible.  Submit refill requests through [employer] or call your pharmacy and they will forward the refill request to us.  "Please allow 3 business days for your refill to be completed.          Additional Information About Your Visit        MyChart Information     [employer] lets you send messages to your doctor, view your test results, renew your prescriptions, schedule appointments and more. To sign up, go to [email or web address] . Click on \"Log in\" on the left side of the screen, which will take you to the Welcome page. Then click on \"Sign up Now\" on the right side of the page.     You will be asked to enter the access code listed below, as well as some personal information. Please follow the directions to create your username and password.     Your access code is: [email or web address]  Expires: 2018  7:42 PM     Your access code will  in 90 days. If you need help or a new code, please call your [facility] clinic or [phone].        Care EveryWhere ID     This is your Care EveryWhere ID. This could be used by other organizations to access your [facility] medical records  [id number]        Your Vitals Were     Pulse Temperature Height Last Period Pulse Oximetry Breastfeeding?    91 98.2  F (36.8  C) (Oral) 5' 4\" (1.626 m) 04/15/2016 100% No    BMI (Body Mass Index)                   34.62 kg/m2            Blood Pressure from Last 3 Encounters:   10/16/17 104/68   10/13/17 120/80   10/12/17 138/84    Weight from Last 3 Encounters:   10/16/17 201 lb 11.2 oz (91.5 kg)   10/13/17 201 lb (91.2 kg)   10/12/17 201 lb 8 oz (91.4 kg)              Today, you had the following     No orders found for display         Where to get your medicines      Some of these will need a paper prescription and others can be bought over the counter.  Ask your nurse if you have questions.     Bring a paper prescription for each of these medications     oxyCODONE 5 MG IR tablet          Primary Care Provider Office Phone # Fax #    Roro Chawla [phone]       [address] [facility] [address]        Equal Access " to Services     CHARITY MANDUJANO : Ramon Jin, waneliada luqadaha, qaybta kaalmatori horvath, caryl saunders. So [facility] [phone].    ATENCIÓN: Si habla yong, tiene a vargas disposición servicios gratuitos de asistencia lingüística. Llame al [phone].    We comply with applicable federal civil rights laws and [place] laws. We do not discriminate on the basis of race, color, national origin, age, disability, sex, sexual orientation, or gender identity.            Thank you!     Thank you for choosing [facility]  for your care. Our goal is always to provide you with excellent care. Hearing back from our patients is one way we can continue to improve our services. Please take a few minutes to complete the written survey that you may receive in the mail after your visit with us. Thank you!             Your Updated Medication List - Protect others around you: Learn how to safely use, store and throw away your medicines at www.disposemymeds.org.          This list is accurate as of: 10/16/17  3:02 PM.  Always use your most recent med list.                   Brand Name Dispense Instructions for use Diagnosis    albuterol 108 (90 BASE) MCG/ACT Inhaler    VENTOLIN HFA    3 Inhaler    Inhale 2 puffs into the lungs every 6 hours    Shortness of breath       amLODIPine 10 MG tablet    NORVASC    90 tablet    Take 1 tablet (10 mg) by mouth daily    HTN, goal below 140/90       atorvastatin 80 MG tablet    LIPITOR    90 tablet    Take 1 tablet (80 mg) by mouth daily    Family history of ischemic heart disease, Hyperlipidemia LDL goal <130       budesonide-formoterol 160-4.5 MCG/ACT Inhaler    SYMBICORT    3 Inhaler    Inhale 2 puffs into the lungs 2 times daily    Uncomplicated asthma, unspecified asthma severity       cetirizine HCl 10 MG Caps     90 capsule    Take 1 capsule by mouth daily    Chronic rhinitis       chlorhexidine 4 % liquid    HIBICLENS    946 mL     Wash ab, groin, thighs daily in shower DO NOT PUT ON FACE    Hidradenitis suppurativa       clindamycin-benzoyl peroxide gel    BENZACLIN    100 g    Apply topically 2 times daily    Hidradenitis suppurativa       desonide 0.05 % cream    DESOWEN    60 g    Apply sparingly to affected area three times daily as needed.    Localized pruritus       diazepam 10 MG tablet    VALIUM    90 tablet    Take 1 tablet (10 mg) by mouth every 8 hours as needed for anxiety or sleep For sleep, anxiety, and muscle tension.    Chest wall pain, Muscle pain, Anxiety       doxepin 10 MG capsule    SINEquan    30 capsule    Take 1 capsule (10 mg) by mouth At Bedtime    Persistent insomnia       escitalopram 20 MG tablet    LEXAPRO    90 tablet    Take 1 tablet (20 mg) by mouth daily Increased dose.    Anxiety       estradiol 0.1 MG/GM cream    ESTRACE VAGINAL    42.5 g    1 gm pv nightly at bedtime for 2 weeks, then twice a week at bedtime for maintenance.    Atrophic vaginitis       fluticasone 50 MCG/ACT spray    FLONASE    16 g    Spray 2 sprays into both nostrils daily    Severe chronic obstructive pulmonary disease (H)       furosemide 20 MG tablet    LASIX    90 tablet    Take 1 tablet (20 mg) by mouth daily as needed    Bilateral leg edema       hydrOXYzine 50 MG tablet    ATARAX    120 tablet    Take 1 tablet (50 mg) by mouth 4 times daily as needed for anxiety Increased dose. For anxiety    Anxiety       ipratropium - albuterol 0.5 mg/2.5 mg/3 mL 0.5-2.5 (3) MG/3ML neb solution    DUONEB    360 mL    Take 1 vial (3 mLs) by nebulization every 6 hours    Uncomplicated asthma, unspecified asthma severity       ketoconazole 2 % shampoo    NIZORAL    120 mL    Apply to the affected area and wash off after 5 minutes.    Localized pruritus       lisinopril 20 MG tablet    PRINIVIL/ZESTRIL    90 tablet    Take 1 tablet (20 mg) by mouth daily    Essential hypertension with goal blood pressure less than 140/90       montelukast 10 MG  tablet    SINGULAIR    30 tablet    Take 1 tablet (10 mg) by mouth At Bedtime    Dust allergy, Severe chronic obstructive pulmonary disease (H)       NICORETTE 2 MG lozenge   Generic drug:  nicotine polacrilex      PLACE 1 LOZENGE INSIDE CHEEK Q HOUR PRF SMOKING CESSATION UTD        omeprazole 20 MG CR capsule    priLOSEC    30 capsule    Take 1 capsule (20 mg) by mouth 2 times daily    Acute gastritis, presence of bleeding unspecified, unspecified gastritis type       ondansetron 8 MG tablet    ZOFRAN    30 tablet    Take 0.5-1 tablets (4-8 mg) by mouth every 8 hours as needed for nausea    Nausea       order for DME     1 Device    Equipment being ordered: Nebulizer machine Length of need-lifetime    Chronic lung disease, Cough, Severe persistent asthma with acute exacerbation       oxyCODONE 5 MG IR tablet   Start taking on:  10/19/2017    ROXICODONE    35 tablet    Take 1 tablet (5 mg) by mouth every 6 hours as needed for pain    Abdominal pain, epigastric       prochlorperazine 5 MG tablet    COMPAZINE    90 tablet    Take 1 tablet (5 mg) by mouth every 6 hours as needed for nausea or vomiting    Nausea       ranitidine 75 MG tablet    ZANTAC    90 tablet    Take 2 tablets (150 mg) by mouth daily    Uncomplicated asthma, unspecified asthma severity       tiotropium 18 MCG capsule    SPIRIVA HANDIHALER    30 capsule    Inhale contents of one capsule daily.    Other emphysema (H)       tiZANidine 4 MG tablet    ZANAFLEX     TK 1 T PO  TID PRF MUSCLE SPASM        varenicline 0.5 MG X 11 & 1 MG X 42 tablet    CHANTIX STARTING MONTH CHAPITO    53 tablet    Take 0.5 mg tab daily for 3 days, then 0.5 mg tab twice daily for 4 days, then 1 mg twice daily.    Encounter for smoking cessation counseling       venlafaxine 37.5 MG 24 hr capsule    EFFEXOR-XR    46 capsule    Take 1 capsule daily for 14 days, then take 2 capsules daily.    Menopausal syndrome (hot flashes)       VITAMIN D (CHOLECALCIFEROL) PO      Take 2,000  Units by mouth daily

## 2017-10-16 NOTE — NURSING NOTE
"Chief Complaint   Patient presents with     Pre-Op Exam     Refill Request       Initial /68 (BP Location: Right arm, Patient Position: Chair, Cuff Size: Adult Large)  Pulse 91  Temp 98.2  F (36.8  C) (Oral)  Ht 5' 4\" (1.626 m)  Wt 201 lb 11.2 oz (91.5 kg)  LMP 04/15/2016  SpO2 100%  Breastfeeding? No  BMI 34.62 kg/m2 Estimated body mass index is 34.62 kg/(m^2) as calculated from the following:    Height as of this encounter: 5' 4\" (1.626 m).    Weight as of this encounter: 201 lb 11.2 oz (91.5 kg).  Medication Reconciliation: complete   Carolyn Villarreal CMA      "

## 2017-10-16 NOTE — PATIENT INSTRUCTIONS
Plan:  1. In the morning of the surgery day you take with a sip of water just these meds: Lisinopril and Omeprazole The rest of the meds you resume after surgery.  2. Follow up on Oct 24 and we will decide about the Oxycodine dose

## 2017-10-18 NOTE — H&P (VIEW-ONLY)
*Pre-Op Exam  *Refill Request-She will be due for refill around the time Dr. Low will be out next week, wondering if she can have post-dated Rx's to bring down to the pharmacy?    [facility]  [address]  [facility] [address]  [phone]  Dept: [phone]    PRE-OP EVALUATION:  Today's date: 10/16/2017    Swetha Patterson (: 1970) presents for pre-operative evaluation assessment as requested by Dr. Ney Jerry.  She requires evaluation and anesthesia risk assessment prior to undergoing surgery/procedure for treatment of abd pain  .  Proposed procedure: Laparoscopic cholecystectomy    Date of Surgery/ Procedure: 10/19/17  Time of Surgery/ Procedure: 8:40AM  Hospital/Surgical Facility: [facility]  Primary Physician: Roro hCawla  Type of Anesthesia Anticipated: General    Patient has a Health Care Directive or Living Will:  NO, information given to her and she will try to complete it before then.     Preop Questions 10/16/2017   1.  Do you have a history of heart attack, stroke, stent, bypass or surgery on an artery in the head, neck, heart or legs? No   2.  Do you ever have any pain or discomfort in your chest? No   3.  Do you have a history of  Heart Failure? No   4.   Are you troubled by shortness of breath when:  walking on a level surface, or up a slight hill, or at night? YES - COPD - stable    5.  Do you currently have a cold, bronchitis or other respiratory infection? No   6.  Do you have a cough, shortness of breath, or wheezing? YES - COPD - stable    7.  Do you sometimes get pains in the calves of your legs when you walk? No   8. Do you or anyone in your family have previous history of blood clots? No   9.  Do you or does anyone in your family have a serious bleeding problem such as prolonged bleeding following surgeries or cuts? No   10. Have you ever had problems with anemia or been told to take iron pills? No   11. Have  you had any abnormal blood loss such as black, tarry or bloody stools, or abnormal vaginal bleeding? No   12. Have you ever had a blood transfusion? No   13. Have you or any of your relatives ever had problems with anesthesia? No   14. Do you have sleep apnea, excessive snoring or daytime drowsiness? No   15. Do you have any prosthetic heart valves? No   16. Do you have prosthetic joints? No   17. Is there any chance that you may be pregnant? No       CC:  Preop for multiple medical problems.    HPI:    The patient is scheduled for gallbladder surgery  with Dr. Jerry on  10/19/2017  No other acute complaints.    Assessment:  1. V72.83H Preop general physical exam _ I do not see any major contraindications for the patient to go through the scheduled surgery.    The proposed surgical procedure is considered  INTERMEDIATE, surgery risk.    For above listed surgery and anesthesia, Patient is at MODERATE, risk for surgery/procedure and perioperative/procedure complications.        ECG:    sinus rhythm, no changes suggestive for ischemia      (R10.13) Abdominal pain, epigastric  Comment:   Plan: surgery, as above     (J44.9) Chr Lung Disease - managed by the pulm dept ([facility])^^^  Comment: stable   Plan: Continue same meds, same doses for now     (F11.20) Continuous opioid dependence ([facility]) opioid for chr cough and chest pain ^^^^  Comment: chronic Oxycodone 10 times a day. For the last 2 weeks she has been taking 15 mg 5 times a day   Plan: Continue same meds, same doses for now     (E78.5) Hyperlipidemia LDL goal <130  Comment:   Plan: Continue same meds, same doses for now     (I10) Essential hypertension with goal blood pressure less than 140/90  Comment: Controlled    Plan: Continue same meds, same doses for now        Plan:  1. In the morning of the surgery day you take with a sip of water just these meds: Lisinopril and Omeprazole The rest of the meds you resume after surgery.  2. Follow up on Oct 24 and we will  decide about the Oxycodine dose      ROS:   General: Negative for fever, chills, major weight changes, fatigue  Skin: Negative for rashes, abnormal spots  Eyes: Negative for blurred or double vision  ENT/mouth: Negative for sinuses discomfort, earache, sore throat  Respiratory: Negative for cough, wheezes, chronic lung disease  Cardiovascular: Negative for palpitations, leg edema, Pos for chr SOB and cough  Gastrointestinal: Negative for vomiting,  heartburn, blood in stool, diarrhea, constipation. Pos for abd pain as above   Genitourinary: Negative for urinary frequency, blood in urine, history of kidney stones  Neuro: Negative for headaches, numbness, tingling, weakness in arms or legs, history of seizure, recent syncope  Psychiatry: Negative for depression, anxiety, suicidal thoughts  Endo: Negative for known thyroid disease, diabetes.  Hemato/Lymph: Negative for nodes, easy bleeding, history of DVT, blood transfusion  Musculoskeletal: Negative for joint swelling, back pain      Past Medical History:   Diagnosis Date     Anxiety state, unspecified      Arthritis     right hip arthritis     Asthma      Chronic pain     back pain from cyst     Contact dermatitis and other eczema, due to unspecified cause      COPD (chronic obstructive pulmonary disease) (H)      Depressive disorder, not elsewhere classified      Emphysema with chronic bronchitis (H)      Esophageal reflux      Family history of ischemic heart disease      Gastro-oesophageal reflux disease      History of emphysema      Hoarseness      HTN, goal below 140/90      Hyperlipidemia LDL goal <130      Pneumonia      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)      PONV (postoperative nausea and vomiting)       Past Surgical History:   Procedure Laterality Date     ARTHROSCOPY SHOULDER DECOMPRESSION Left 10/21/2015    Procedure: ARTHROSCOPY SHOULDER DECOMPRESSION;  Surgeon: Julien Milian MD;  Location: [facility] OR     BRONCHIAL THERMOPLASTY N/A 11/14/2014     Procedure: BRONCHIAL THERMOPLASTY;  Surgeon: Ward Whitaker MD;  Location: [facility]     BRONCHIAL THERMOPLASTY N/A 12/19/2014    Procedure: BRONCHIAL THERMOPLASTY;  Surgeon: Ward Whitaker MD;  [facility]: [facility]     BRONCHIAL THERMOPLASTY N/A 2/6/2015    Procedure: BRONCHIAL THERMOPLASTY;  Surgeon: Ward Whitaker MD;  Location: [facility]     C APPENDECTOMY  at age 17     EXCISE NODE MEDIASTINAL  4/26/2013    Procedure: EXCISE NODE MEDIASTINAL;;  Surgeon: Av Peña MD;  Location:  OR     THORACOSCOPY  4/26/2013    Procedure: THORACOSCOPY;  LEFT VIDEO ASSISTED THORACOSCOPY, RESECTION OF POSTERIOR MEDIASTINAL MASS;  Surgeon: Av Peña MD;  Location:  OR     TONSILLECTOMY  as a kid     TONSILLECTOMY          PSHx: No complications with prior surgeries or anesthesias.    Soc Hx: No daily alcohol, no smoking    Family History   Problem Relation Age of Onset     HEART DISEASE Mother      murmur, mi     Hypertension Mother      CEREBROVASCULAR DISEASE Mother      Depression Mother      Gallbladder Disease Mother      Asthma Mother      Family History Negative Father      does not know  him     Family History Negative Brother      HEART DISEASE Maternal Grandfather      Asthma Maternal Grandfather      Hypertension Maternal Grandfather      CEREBROVASCULAR DISEASE Maternal Grandfather      DIABETES Maternal Grandfather      Asthma Sister      Hypertension Sister      CEREBROVASCULAR DISEASE Sister      Hypertension Maternal Grandmother      CEREBROVASCULAR DISEASE Maternal Grandmother         All: reviewed    Current Outpatient Prescriptions   Medication Sig Dispense Refill     oxyCODONE (ROXICODONE) 5 MG IR tablet Take 1 tablet (5 mg) by mouth every 6 hours as needed for pain 35 tablet 0     hydrOXYzine (ATARAX) 50 MG tablet Take 1 tablet (50 mg) by mouth 4 times daily as needed for anxiety Increased dose. For anxiety 120 tablet 0     tiZANidine (ZANAFLEX) 4 MG tablet TK 1 T  PO  TID PRF MUSCLE SPASM  0     diazepam (VALIUM) 10 MG tablet Take 1 tablet (10 mg) by mouth every 8 hours as needed for anxiety or sleep For sleep, anxiety, and muscle tension. 90 tablet 0     ondansetron (ZOFRAN) 8 MG tablet Take 0.5-1 tablets (4-8 mg) by mouth every 8 hours as needed for nausea 30 tablet 0     omeprazole (PRILOSEC) 20 MG CR capsule Take 1 capsule (20 mg) by mouth 2 times daily 30 capsule 0     varenicline (CHANTIX STARTING MONTH PAK) 0.5 MG X 11 & 1 MG X 42 tablet Take 0.5 mg tab daily for 3 days, then 0.5 mg tab twice daily for 4 days, then 1 mg twice daily. 53 tablet 0     escitalopram (LEXAPRO) 20 MG tablet Take 1 tablet (20 mg) by mouth daily Increased dose. 90 tablet 1     doxepin (SINEQUAN) 10 MG capsule Take 1 capsule (10 mg) by mouth At Bedtime 30 capsule 1     venlafaxine (EFFEXOR-XR) 37.5 MG 24 hr capsule Take 1 capsule daily for 14 days, then take 2 capsules daily. 46 capsule 0     estradiol (ESTRACE VAGINAL) 0.1 MG/GM cream 1 gm pv nightly at bedtime for 2 weeks, then twice a week at bedtime for maintenance. 42.5 g 6     fluticasone (FLONASE) 50 MCG/ACT spray Spray 2 sprays into both nostrils daily 16 g 11     ranitidine (ZANTAC) 75 MG tablet Take 2 tablets (150 mg) by mouth daily 90 tablet 1     cetirizine HCl 10 MG CAPS Take 1 capsule by mouth daily 90 capsule 3     atorvastatin (LIPITOR) 80 MG tablet Take 1 tablet (80 mg) by mouth daily 90 tablet 1     amLODIPine (NORVASC) 10 MG tablet Take 1 tablet (10 mg) by mouth daily 90 tablet 1     lisinopril (PRINIVIL/ZESTRIL) 20 MG tablet Take 1 tablet (20 mg) by mouth daily 90 tablet 1     irbesartan (AVAPRO) 300 MG tablet Take 1 tablet (300 mg) by mouth daily 90 tablet 1     montelukast (SINGULAIR) 10 MG tablet Take 1 tablet (10 mg) by mouth At Bedtime 30 tablet 1     furosemide (LASIX) 20 MG tablet Take 1 tablet (20 mg) by mouth daily as needed 90 tablet 1     NICORETTE 2 MG lozenge PLACE 1 LOZENGE INSIDE CHEEK Q HOUR PRF SMOKING  "CESSATION UTD  1     budesonide-formoterol (SYMBICORT) 160-4.5 MCG/ACT Inhaler Inhale 2 puffs into the lungs 2 times daily 3 Inhaler 3     ipratropium - albuterol 0.5 mg/2.5 mg/3 mL (DUONEB) 0.5-2.5 (3) MG/3ML neb solution Take 1 vial (3 mLs) by nebulization every 6 hours 360 mL 0     tiotropium (SPIRIVA HANDIHALER) 18 MCG capsule Inhale contents of one capsule daily. 30 capsule 1     albuterol (VENTOLIN HFA) 108 (90 BASE) MCG/ACT Inhaler Inhale 2 puffs into the lungs every 6 hours 3 Inhaler 3     ketoconazole (NIZORAL) 2 % shampoo Apply to the affected area and wash off after 5 minutes. 120 mL 1     desonide (DESOWEN) 0.05 % cream Apply sparingly to affected area three times daily as needed. 60 g 0     prochlorperazine (COMPAZINE) 5 MG tablet Take 1 tablet (5 mg) by mouth every 6 hours as needed for nausea or vomiting 90 tablet 0     order for DME Equipment being ordered: Nebulizer machine  Length of need-lifetime 1 Device 0     clindamycin-benzoyl peroxide (BENZACLIN) gel Apply topically 2 times daily 100 g 3     chlorhexidine (HIBICLENS) 4 % external liquid Wash ab, groin, thighs daily in shower DO NOT PUT ON FACE 946 mL 3     VITAMIN D, CHOLECALCIFEROL, PO Take 2,000 Units by mouth daily          Physical exam:  Blood pressure 104/68, pulse 91, temperature 98.2  F (36.8  C), temperature source Oral, height 5' 4\" (1.626 m), weight 201 lb 11.2 oz (91.5 kg), last menstrual period 04/15/2016, SpO2 100 %, not currently breastfeeding.    NAD, appears comfortable  Skin clear, no rashes  HEENT: PERRLA, EOMI, anicteric sclera, pink conjunctiva, external ears appear normal, bilateral tympanic membranes clinically normal, oropharynx normal color.  Neck: supple, no JVD, no thyroidmegaly  Lymph nodes non palpable in the cervical, supraclavicular   Chest: clear to auscultation with good respiratory effort  Cardiac: S1S2, RRR, no mgr appreciated  Abdomen: soft, epig  tender, not distended, audible bowel sound, no " hepatosplenomegaly, no palpable masses, no abdominal bruits  Extremities: no cyanosis, clubbing or edema.   Neuro: A, Ox3, no focal signs.      Roro Low MD  Internal Medicine       MEDICAL HISTORY:                                                    Patient Active Problem List    Diagnosis Date Noted     Essential hypertension with goal blood pressure less than 140/90 11/01/2016     Priority: Medium     DIAZ (nonalcoholic steatohepatitis) 06/17/2016     Priority: Medium     Chr Lung Disease - managed by the pulm dept ([facility])^^^ 04/15/2016     Priority: Medium     Continuous opioid dependence (HCC) opioid for chr cough and chest pain ^^^^ 04/15/2016     Priority: Medium     Chr PAIN - Ctr SUBSTANCE AGREEMENT - SIGNED 10/27/2015     Priority: Medium     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) 10/19/2015     Priority: Medium     Anxiety 10/13/2015     Priority: Medium     Gastroesophageal reflux disease without esophagitis 10/13/2015     Priority: Medium     Hyperlipidemia LDL goal <130      Priority: Medium     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)      Priority: Medium     Family history of ischemic heart disease      Priority: Medium     Mediastinal cyst 04/12/2013     Priority: Medium      Past Medical History:   Diagnosis Date     Anxiety state, unspecified      Arthritis     right hip arthritis     Asthma      Chronic pain     back pain from cyst     Contact dermatitis and other eczema, due to unspecified cause      COPD (chronic obstructive pulmonary disease) (H)      Depressive disorder, not elsewhere classified      Emphysema with chronic bronchitis (H)      Esophageal reflux      Family history of ischemic heart disease      Gastro-oesophageal reflux disease      History of emphysema      Hoarseness      HTN, goal below 140/90      Hyperlipidemia LDL goal <130      Pneumonia      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)      PONV (postoperative nausea and vomiting)      Past Surgical History:    Procedure Laterality Date     ARTHROSCOPY SHOULDER DECOMPRESSION Left 10/21/2015    Procedure: ARTHROSCOPY SHOULDER DECOMPRESSION;  Surgeon: Julien Milian MD;  Location: [facility] OR     BRONCHIAL THERMOPLASTY N/A 11/14/2014    Procedure: BRONCHIAL THERMOPLASTY;  Surgeon: Ward Whitaker MD;  [facility]: [facility]     BRONCHIAL THERMOPLASTY N/A 12/19/2014    Procedure: BRONCHIAL THERMOPLASTY;  Surgeon: Ward Whitaker MD;  [facility]     BRONCHIAL THERMOPLASTY N/A 2/6/2015    Procedure: BRONCHIAL THERMOPLASTY;  Surgeon: Ward Whitaker MD;  [facility]     C APPENDECTOMY  at age 17     EXCISE NODE MEDIASTINAL  4/26/2013    Procedure: EXCISE NODE MEDIASTINAL;;  Surgeon: Av Peña MD;  Location:  OR     THORACOSCOPY  4/26/2013    Procedure: THORACOSCOPY;  LEFT VIDEO ASSISTED THORACOSCOPY, RESECTION OF POSTERIOR MEDIASTINAL MASS;  Surgeon: Av Peña MD;  Location:  OR     TONSILLECTOMY  as a kid     TONSILLECTOMY       Current Outpatient Prescriptions   Medication Sig Dispense Refill     oxyCODONE (ROXICODONE) 5 MG IR tablet Take 1 tablet (5 mg) by mouth every 6 hours as needed for pain 35 tablet 0     hydrOXYzine (ATARAX) 50 MG tablet Take 1 tablet (50 mg) by mouth 4 times daily as needed for anxiety Increased dose. For anxiety 120 tablet 0     tiZANidine (ZANAFLEX) 4 MG tablet TK 1 T PO  TID PRF MUSCLE SPASM  0     diazepam (VALIUM) 10 MG tablet Take 1 tablet (10 mg) by mouth every 8 hours as needed for anxiety or sleep For sleep, anxiety, and muscle tension. 90 tablet 0     ondansetron (ZOFRAN) 8 MG tablet Take 0.5-1 tablets (4-8 mg) by mouth every 8 hours as needed for nausea 30 tablet 0     omeprazole (PRILOSEC) 20 MG CR capsule Take 1 capsule (20 mg) by mouth 2 times daily 30 capsule 0     varenicline (CHANTIX STARTING MONTH PAK) 0.5 MG X 11 & 1 MG X 42 tablet Take 0.5 mg tab daily for 3 days, then 0.5 mg tab twice daily for 4 days, then 1 mg twice daily.  53 tablet 0     escitalopram (LEXAPRO) 20 MG tablet Take 1 tablet (20 mg) by mouth daily Increased dose. 90 tablet 1     doxepin (SINEQUAN) 10 MG capsule Take 1 capsule (10 mg) by mouth At Bedtime 30 capsule 1     venlafaxine (EFFEXOR-XR) 37.5 MG 24 hr capsule Take 1 capsule daily for 14 days, then take 2 capsules daily. 46 capsule 0     estradiol (ESTRACE VAGINAL) 0.1 MG/GM cream 1 gm pv nightly at bedtime for 2 weeks, then twice a week at bedtime for maintenance. 42.5 g 6     fluticasone (FLONASE) 50 MCG/ACT spray Spray 2 sprays into both nostrils daily 16 g 11     ranitidine (ZANTAC) 75 MG tablet Take 2 tablets (150 mg) by mouth daily 90 tablet 1     cetirizine HCl 10 MG CAPS Take 1 capsule by mouth daily 90 capsule 3     atorvastatin (LIPITOR) 80 MG tablet Take 1 tablet (80 mg) by mouth daily 90 tablet 1     amLODIPine (NORVASC) 10 MG tablet Take 1 tablet (10 mg) by mouth daily 90 tablet 1     lisinopril (PRINIVIL/ZESTRIL) 20 MG tablet Take 1 tablet (20 mg) by mouth daily 90 tablet 1     irbesartan (AVAPRO) 300 MG tablet Take 1 tablet (300 mg) by mouth daily 90 tablet 1     montelukast (SINGULAIR) 10 MG tablet Take 1 tablet (10 mg) by mouth At Bedtime 30 tablet 1     furosemide (LASIX) 20 MG tablet Take 1 tablet (20 mg) by mouth daily as needed 90 tablet 1     NICORETTE 2 MG lozenge PLACE 1 LOZENGE INSIDE CHEEK Q HOUR PRF SMOKING CESSATION UTD  1     budesonide-formoterol (SYMBICORT) 160-4.5 MCG/ACT Inhaler Inhale 2 puffs into the lungs 2 times daily 3 Inhaler 3     ipratropium - albuterol 0.5 mg/2.5 mg/3 mL (DUONEB) 0.5-2.5 (3) MG/3ML neb solution Take 1 vial (3 mLs) by nebulization every 6 hours 360 mL 0     tiotropium (SPIRIVA HANDIHALER) 18 MCG capsule Inhale contents of one capsule daily. 30 capsule 1     albuterol (VENTOLIN HFA) 108 (90 BASE) MCG/ACT Inhaler Inhale 2 puffs into the lungs every 6 hours 3 Inhaler 3     ketoconazole (NIZORAL) 2 % shampoo Apply to the affected area and wash off after 5  minutes. 120 mL 1     desonide (DESOWEN) 0.05 % cream Apply sparingly to affected area three times daily as needed. 60 g 0     prochlorperazine (COMPAZINE) 5 MG tablet Take 1 tablet (5 mg) by mouth every 6 hours as needed for nausea or vomiting 90 tablet 0     order for DME Equipment being ordered: Nebulizer machine  Length of need-lifetime 1 Device 0     clindamycin-benzoyl peroxide (BENZACLIN) gel Apply topically 2 times daily 100 g 3     chlorhexidine (HIBICLENS) 4 % external liquid Wash ab, groin, thighs daily in shower DO NOT PUT ON FACE 946 mL 3     VITAMIN D, CHOLECALCIFEROL, PO Take 2,000 Units by mouth daily       OTC products: None, except as noted above    Allergies   Allergen Reactions     Codeine      vomiting     Hydrocodone Nausea and Vomiting     Sulfa Drugs      Nausea     Gabapentin Rash      Latex Allergy: NO    Social History   Substance Use Topics     Smoking status: Light Tobacco Smoker     Years: 30.00     Types: Cigarettes     Last attempt to quit: 9/1/2015     Smokeless tobacco: Never Used      Comment: off/on 2 cigs per day     Alcohol use No     History   Drug Use No       Recent Labs   Lab Test  09/26/17   1052  09/19/17   1630  03/27/17   1131   04/26/13   0752   03/21/13   1002   HGB  14.2  15.1  14.2   < >  13.7   < >   --    PLT  362  414  320   < >  336   < >   --    INR   --    --   0.90   --   0.92   --    --    NA  139  137  140   < >  137   < >   --    POTASSIUM  4.1  3.9  4.4   < >  4.0   < >   --    CR  0.72  0.82  0.70   < >  0.78   < >   --    A1C   --    --    --    --    --    --   5.4    < > = values in this interval not displayed.

## 2017-10-19 ENCOUNTER — ANESTHESIA EVENT (OUTPATIENT)
Dept: SURGERY | Facility: CLINIC | Age: 47
End: 2017-10-19
Payer: MEDICARE

## 2017-10-19 ENCOUNTER — APPOINTMENT (OUTPATIENT)
Dept: SURGERY | Facility: PHYSICIAN GROUP | Age: 47
End: 2017-10-19
Payer: MEDICARE

## 2017-10-19 ENCOUNTER — ANESTHESIA (OUTPATIENT)
Dept: SURGERY | Facility: CLINIC | Age: 47
End: 2017-10-19
Payer: MEDICARE

## 2017-10-19 ENCOUNTER — HOSPITAL ENCOUNTER (OUTPATIENT)
Facility: CLINIC | Age: 47
Discharge: HOME | End: 2017-10-19
Attending: SURGERY | Admitting: SURGERY
Payer: MEDICARE

## 2017-10-19 VITALS
DIASTOLIC BLOOD PRESSURE: 86 MMHG | OXYGEN SATURATION: 97 % | HEIGHT: 64 IN | BODY MASS INDEX: 34.66 KG/M2 | RESPIRATION RATE: 14 BRPM | SYSTOLIC BLOOD PRESSURE: 136 MMHG | WEIGHT: 203 LBS | TEMPERATURE: 97 F

## 2017-10-19 LAB — HCG SERPL QL: NEGATIVE

## 2017-10-19 PROCEDURE — 47562 LAPAROSCOPIC CHOLECYSTECTOMY: CPT | Mod: AS | Performed by: PHYSICIAN ASSISTANT

## 2017-10-19 PROCEDURE — 25000128 H RX IP 250 OP 636: Performed by: SURGERY

## 2017-10-19 PROCEDURE — 25000128 H RX IP 250 OP 636: Performed by: ANESTHESIOLOGY

## 2017-10-19 PROCEDURE — 25800025 ZZH RX 258: Performed by: SURGERY

## 2017-10-19 PROCEDURE — 36000056 ZZH SURGERY LEVEL 3 1ST 30 MIN: Performed by: SURGERY

## 2017-10-19 PROCEDURE — 27210794 ZZH OR GENERAL SUPPLY STERILE: Performed by: SURGERY

## 2017-10-19 PROCEDURE — 36000058 ZZH SURGERY LEVEL 3 EA 15 ADDTL MIN: Performed by: SURGERY

## 2017-10-19 PROCEDURE — 25000132 ZZH RX MED GY IP 250 OP 250 PS 637: Mod: GY | Performed by: SURGERY

## 2017-10-19 PROCEDURE — 71000012 ZZH RECOVERY PHASE 1 LEVEL 1 FIRST HR: Performed by: SURGERY

## 2017-10-19 PROCEDURE — 25000566 ZZH SEVOFLURANE, EA 15 MIN: Performed by: SURGERY

## 2017-10-19 PROCEDURE — 25000125 ZZHC RX 250: Performed by: NURSE ANESTHETIST, CERTIFIED REGISTERED

## 2017-10-19 PROCEDURE — 88304 TISSUE EXAM BY PATHOLOGIST: CPT | Performed by: SURGERY

## 2017-10-19 PROCEDURE — 71000013 ZZH RECOVERY PHASE 1 LEVEL 1 EA ADDTL HR: Performed by: SURGERY

## 2017-10-19 PROCEDURE — 25000125 ZZHC RX 250: Performed by: ANESTHESIOLOGY

## 2017-10-19 PROCEDURE — 25000125 ZZHC RX 250: Performed by: SURGERY

## 2017-10-19 PROCEDURE — 37000009 ZZH ANESTHESIA TECHNICAL FEE, EACH ADDTL 15 MIN: Performed by: SURGERY

## 2017-10-19 PROCEDURE — 37000008 ZZH ANESTHESIA TECHNICAL FEE, 1ST 30 MIN: Performed by: SURGERY

## 2017-10-19 PROCEDURE — 88304 TISSUE EXAM BY PATHOLOGIST: CPT | Mod: 26 | Performed by: SURGERY

## 2017-10-19 PROCEDURE — 25000128 H RX IP 250 OP 636: Performed by: NURSE ANESTHETIST, CERTIFIED REGISTERED

## 2017-10-19 PROCEDURE — 40000306 ZZH STATISTIC PRE PROC ASSESS II: Performed by: SURGERY

## 2017-10-19 PROCEDURE — 47562 LAPAROSCOPIC CHOLECYSTECTOMY: CPT | Performed by: SURGERY

## 2017-10-19 PROCEDURE — S0020 INJECTION, BUPIVICAINE HYDRO: HCPCS | Performed by: SURGERY

## 2017-10-19 PROCEDURE — 71000027 ZZH RECOVERY PHASE 2 EACH 15 MINS: Performed by: SURGERY

## 2017-10-19 PROCEDURE — 36415 COLL VENOUS BLD VENIPUNCTURE: CPT | Performed by: ANESTHESIOLOGY

## 2017-10-19 PROCEDURE — 84703 CHORIONIC GONADOTROPIN ASSAY: CPT | Performed by: ANESTHESIOLOGY

## 2017-10-19 PROCEDURE — A9270 NON-COVERED ITEM OR SERVICE: HCPCS | Mod: GY | Performed by: SURGERY

## 2017-10-19 RX ORDER — SODIUM CHLORIDE, SODIUM LACTATE, POTASSIUM CHLORIDE, CALCIUM CHLORIDE 600; 310; 30; 20 MG/100ML; MG/100ML; MG/100ML; MG/100ML
INJECTION, SOLUTION INTRAVENOUS CONTINUOUS
Status: DISCONTINUED | OUTPATIENT
Start: 2017-10-19 | End: 2017-10-19 | Stop reason: HOSPADM

## 2017-10-19 RX ORDER — FENTANYL CITRATE 50 UG/ML
25-50 INJECTION, SOLUTION INTRAMUSCULAR; INTRAVENOUS
Status: DISCONTINUED | OUTPATIENT
Start: 2017-10-19 | End: 2017-10-19 | Stop reason: HOSPADM

## 2017-10-19 RX ORDER — LIDOCAINE 40 MG/G
CREAM TOPICAL
Status: DISCONTINUED | OUTPATIENT
Start: 2017-10-19 | End: 2017-10-19 | Stop reason: HOSPADM

## 2017-10-19 RX ORDER — ACETAMINOPHEN 10 MG/ML
1000 INJECTION, SOLUTION INTRAVENOUS ONCE
Status: COMPLETED | OUTPATIENT
Start: 2017-10-19 | End: 2017-10-19

## 2017-10-19 RX ORDER — OXYCODONE HYDROCHLORIDE 5 MG/1
5-10 TABLET ORAL
Status: COMPLETED | OUTPATIENT
Start: 2017-10-19 | End: 2017-10-19

## 2017-10-19 RX ORDER — DEXAMETHASONE SODIUM PHOSPHATE 4 MG/ML
INJECTION, SOLUTION INTRA-ARTICULAR; INTRALESIONAL; INTRAMUSCULAR; INTRAVENOUS; SOFT TISSUE PRN
Status: DISCONTINUED | OUTPATIENT
Start: 2017-10-19 | End: 2017-10-19

## 2017-10-19 RX ORDER — PROMETHAZINE HYDROCHLORIDE 25 MG/ML
25 INJECTION INTRAMUSCULAR; INTRAVENOUS ONCE
Status: COMPLETED | OUTPATIENT
Start: 2017-10-19 | End: 2017-10-19

## 2017-10-19 RX ORDER — GLYCOPYRROLATE 0.2 MG/ML
INJECTION, SOLUTION INTRAMUSCULAR; INTRAVENOUS PRN
Status: DISCONTINUED | OUTPATIENT
Start: 2017-10-19 | End: 2017-10-19

## 2017-10-19 RX ORDER — LIDOCAINE HYDROCHLORIDE 10 MG/ML
INJECTION, SOLUTION INFILTRATION; PERINEURAL PRN
Status: DISCONTINUED | OUTPATIENT
Start: 2017-10-19 | End: 2017-10-19

## 2017-10-19 RX ORDER — KETOROLAC TROMETHAMINE 30 MG/ML
30 INJECTION, SOLUTION INTRAMUSCULAR; INTRAVENOUS ONCE
Status: COMPLETED | OUTPATIENT
Start: 2017-10-19 | End: 2017-10-19

## 2017-10-19 RX ORDER — CEFAZOLIN SODIUM 2 G/100ML
2 INJECTION, SOLUTION INTRAVENOUS
Status: COMPLETED | OUTPATIENT
Start: 2017-10-19 | End: 2017-10-19

## 2017-10-19 RX ORDER — ONDANSETRON 4 MG/1
4 TABLET, ORALLY DISINTEGRATING ORAL EVERY 30 MIN PRN
Status: DISCONTINUED | OUTPATIENT
Start: 2017-10-19 | End: 2017-10-19 | Stop reason: HOSPADM

## 2017-10-19 RX ORDER — PROPOFOL 10 MG/ML
INJECTION, EMULSION INTRAVENOUS CONTINUOUS PRN
Status: DISCONTINUED | OUTPATIENT
Start: 2017-10-19 | End: 2017-10-19

## 2017-10-19 RX ORDER — BUPIVACAINE HYDROCHLORIDE 5 MG/ML
INJECTION, SOLUTION PERINEURAL PRN
Status: DISCONTINUED | OUTPATIENT
Start: 2017-10-19 | End: 2017-10-19 | Stop reason: HOSPADM

## 2017-10-19 RX ORDER — HYDRALAZINE HYDROCHLORIDE 20 MG/ML
2.5-5 INJECTION INTRAMUSCULAR; INTRAVENOUS EVERY 10 MIN PRN
Status: DISCONTINUED | OUTPATIENT
Start: 2017-10-19 | End: 2017-10-19 | Stop reason: HOSPADM

## 2017-10-19 RX ORDER — PROPOFOL 10 MG/ML
INJECTION, EMULSION INTRAVENOUS PRN
Status: DISCONTINUED | OUTPATIENT
Start: 2017-10-19 | End: 2017-10-19

## 2017-10-19 RX ORDER — HYDROMORPHONE HYDROCHLORIDE 1 MG/ML
.3-.5 INJECTION, SOLUTION INTRAMUSCULAR; INTRAVENOUS; SUBCUTANEOUS EVERY 10 MIN PRN
Status: DISCONTINUED | OUTPATIENT
Start: 2017-10-19 | End: 2017-10-19 | Stop reason: HOSPADM

## 2017-10-19 RX ORDER — FENTANYL CITRATE 50 UG/ML
50 INJECTION, SOLUTION INTRAMUSCULAR; INTRAVENOUS ONCE
Status: COMPLETED | OUTPATIENT
Start: 2017-10-19 | End: 2017-10-19

## 2017-10-19 RX ORDER — MEPERIDINE HYDROCHLORIDE 25 MG/ML
12.5 INJECTION INTRAMUSCULAR; INTRAVENOUS; SUBCUTANEOUS
Status: DISCONTINUED | OUTPATIENT
Start: 2017-10-19 | End: 2017-10-19 | Stop reason: HOSPADM

## 2017-10-19 RX ORDER — CEFAZOLIN SODIUM 1 G/3ML
1 INJECTION, POWDER, FOR SOLUTION INTRAMUSCULAR; INTRAVENOUS SEE ADMIN INSTRUCTIONS
Status: DISCONTINUED | OUTPATIENT
Start: 2017-10-19 | End: 2017-10-19 | Stop reason: HOSPADM

## 2017-10-19 RX ORDER — FENTANYL CITRATE 50 UG/ML
INJECTION, SOLUTION INTRAMUSCULAR; INTRAVENOUS PRN
Status: DISCONTINUED | OUTPATIENT
Start: 2017-10-19 | End: 2017-10-19

## 2017-10-19 RX ORDER — ONDANSETRON 2 MG/ML
4 INJECTION INTRAMUSCULAR; INTRAVENOUS EVERY 30 MIN PRN
Status: DISCONTINUED | OUTPATIENT
Start: 2017-10-19 | End: 2017-10-19 | Stop reason: HOSPADM

## 2017-10-19 RX ORDER — NEOSTIGMINE METHYLSULFATE 1 MG/ML
VIAL (ML) INJECTION PRN
Status: DISCONTINUED | OUTPATIENT
Start: 2017-10-19 | End: 2017-10-19

## 2017-10-19 RX ORDER — LABETALOL HYDROCHLORIDE 5 MG/ML
10 INJECTION, SOLUTION INTRAVENOUS
Status: DISCONTINUED | OUTPATIENT
Start: 2017-10-19 | End: 2017-10-19 | Stop reason: HOSPADM

## 2017-10-19 RX ORDER — SCOLOPAMINE TRANSDERMAL SYSTEM 1 MG/1
1 PATCH, EXTENDED RELEASE TRANSDERMAL
Status: DISCONTINUED | OUTPATIENT
Start: 2017-10-19 | End: 2017-10-19 | Stop reason: HOSPADM

## 2017-10-19 RX ORDER — EPHEDRINE SULFATE 50 MG/ML
25 INJECTION, SOLUTION INTRAVENOUS ONCE
Status: COMPLETED | OUTPATIENT
Start: 2017-10-19 | End: 2017-10-19

## 2017-10-19 RX ORDER — NALOXONE HYDROCHLORIDE 0.4 MG/ML
.1-.4 INJECTION, SOLUTION INTRAMUSCULAR; INTRAVENOUS; SUBCUTANEOUS
Status: DISCONTINUED | OUTPATIENT
Start: 2017-10-19 | End: 2017-10-19 | Stop reason: HOSPADM

## 2017-10-19 RX ORDER — ONDANSETRON 2 MG/ML
INJECTION INTRAMUSCULAR; INTRAVENOUS PRN
Status: DISCONTINUED | OUTPATIENT
Start: 2017-10-19 | End: 2017-10-19

## 2017-10-19 RX ADMIN — DEXAMETHASONE SODIUM PHOSPHATE 4 MG: 4 INJECTION, SOLUTION INTRA-ARTICULAR; INTRALESIONAL; INTRAMUSCULAR; INTRAVENOUS; SOFT TISSUE at 09:11

## 2017-10-19 RX ADMIN — KETOROLAC TROMETHAMINE 30 MG: 30 INJECTION, SOLUTION INTRAMUSCULAR at 10:24

## 2017-10-19 RX ADMIN — HYDROMORPHONE HYDROCHLORIDE 1 MG: 1 INJECTION, SOLUTION INTRAMUSCULAR; INTRAVENOUS; SUBCUTANEOUS at 09:39

## 2017-10-19 RX ADMIN — SCOPOLAMINE 1 PATCH: 1 PATCH, EXTENDED RELEASE TRANSDERMAL at 08:05

## 2017-10-19 RX ADMIN — ACETAMINOPHEN 1000 MG: 10 INJECTION, SOLUTION INTRAVENOUS at 12:12

## 2017-10-19 RX ADMIN — SODIUM CHLORIDE, POTASSIUM CHLORIDE, SODIUM LACTATE AND CALCIUM CHLORIDE: 600; 310; 30; 20 INJECTION, SOLUTION INTRAVENOUS at 10:11

## 2017-10-19 RX ADMIN — HYDROMORPHONE HYDROCHLORIDE 0.5 MG: 1 INJECTION, SOLUTION INTRAMUSCULAR; INTRAVENOUS; SUBCUTANEOUS at 10:26

## 2017-10-19 RX ADMIN — FENTANYL CITRATE 50 MCG: 50 INJECTION INTRAMUSCULAR; INTRAVENOUS at 10:45

## 2017-10-19 RX ADMIN — FENTANYL CITRATE 100 MCG: 50 INJECTION, SOLUTION INTRAMUSCULAR; INTRAVENOUS at 09:11

## 2017-10-19 RX ADMIN — Medication 5 MG: at 10:06

## 2017-10-19 RX ADMIN — ROCURONIUM BROMIDE 50 MG: 10 INJECTION INTRAVENOUS at 09:11

## 2017-10-19 RX ADMIN — OXYCODONE HYDROCHLORIDE 10 MG: 5 TABLET ORAL at 12:55

## 2017-10-19 RX ADMIN — ONDANSETRON 4 MG: 2 INJECTION INTRAMUSCULAR; INTRAVENOUS at 09:56

## 2017-10-19 RX ADMIN — HYDROMORPHONE HYDROCHLORIDE 0.5 MG: 1 INJECTION, SOLUTION INTRAMUSCULAR; INTRAVENOUS; SUBCUTANEOUS at 10:53

## 2017-10-19 RX ADMIN — EPHEDRINE SULFATE 25 MG: 50 INJECTION INTRAMUSCULAR; INTRAVENOUS; SUBCUTANEOUS at 12:20

## 2017-10-19 RX ADMIN — PROMETHAZINE HYDROCHLORIDE 25 MG: 25 INJECTION INTRAMUSCULAR; INTRAVENOUS at 12:20

## 2017-10-19 RX ADMIN — FENTANYL CITRATE 50 MCG: 50 INJECTION INTRAMUSCULAR; INTRAVENOUS at 10:40

## 2017-10-19 RX ADMIN — GLYCOPYRROLATE 0.4 MG: 0.2 INJECTION, SOLUTION INTRAMUSCULAR; INTRAVENOUS at 10:06

## 2017-10-19 RX ADMIN — PROPOFOL 75 MCG/KG/MIN: 10 INJECTION, EMULSION INTRAVENOUS at 09:11

## 2017-10-19 RX ADMIN — FENTANYL CITRATE 50 MCG: 50 INJECTION INTRAMUSCULAR; INTRAVENOUS at 12:00

## 2017-10-19 RX ADMIN — CEFAZOLIN SODIUM 2 G: 2 INJECTION, SOLUTION INTRAVENOUS at 09:15

## 2017-10-19 RX ADMIN — GLYCOPYRROLATE 0.2 MG: 0.2 INJECTION, SOLUTION INTRAMUSCULAR; INTRAVENOUS at 09:11

## 2017-10-19 RX ADMIN — SODIUM CHLORIDE, POTASSIUM CHLORIDE, SODIUM LACTATE AND CALCIUM CHLORIDE: 600; 310; 30; 20 INJECTION, SOLUTION INTRAVENOUS at 09:00

## 2017-10-19 RX ADMIN — ONDANSETRON 4 MG: 2 INJECTION INTRAMUSCULAR; INTRAVENOUS at 10:27

## 2017-10-19 RX ADMIN — FENTANYL CITRATE 50 MCG: 50 INJECTION INTRAMUSCULAR; INTRAVENOUS at 11:03

## 2017-10-19 RX ADMIN — PROCHLORPERAZINE EDISYLATE 5 MG: 5 INJECTION INTRAMUSCULAR; INTRAVENOUS at 11:08

## 2017-10-19 RX ADMIN — FENTANYL CITRATE 50 MCG: 50 INJECTION INTRAMUSCULAR; INTRAVENOUS at 08:20

## 2017-10-19 RX ADMIN — MIDAZOLAM HYDROCHLORIDE 2 MG: 1 INJECTION, SOLUTION INTRAMUSCULAR; INTRAVENOUS at 09:04

## 2017-10-19 RX ADMIN — FENTANYL CITRATE 50 MCG: 50 INJECTION INTRAMUSCULAR; INTRAVENOUS at 12:50

## 2017-10-19 RX ADMIN — SODIUM CHLORIDE, POTASSIUM CHLORIDE, SODIUM LACTATE AND CALCIUM CHLORIDE: 600; 310; 30; 20 INJECTION, SOLUTION INTRAVENOUS at 13:28

## 2017-10-19 RX ADMIN — LIDOCAINE HYDROCHLORIDE 40 MG: 10 INJECTION, SOLUTION INFILTRATION; PERINEURAL at 09:11

## 2017-10-19 RX ADMIN — PROPOFOL 180 MG: 10 INJECTION, EMULSION INTRAVENOUS at 09:11

## 2017-10-19 ASSESSMENT — COPD QUESTIONNAIRES: COPD: 1

## 2017-10-19 NOTE — ANESTHESIA CARE TRANSFER NOTE
Patient: Swetha Patterson    Procedure(s):  LAPAROSCOPIC CHOLECYSTECTOMY - Wound Class: II-Clean Contaminated    Diagnosis: Bilary Dyskensia   Diagnosis Additional Information: No value filed.    Anesthesia Type:   General, ETT     Note:  Airway :Blow-by  Patient transferred to:PACU  Handoff Report: Identifed the Patient, Identified the Reponsible Provider, Reviewed the pertinent medical history, Discussed the surgical course, Reviewed Intra-OP anesthesia mangement and issues during anesthesia, Set expectations for post-procedure period and Allowed opportunity for questions and acknowledgement of understanding      Vitals: (Last set prior to Anesthesia Care Transfer)    CRNA VITALS  10/19/2017 0941 - 10/19/2017 1021      10/19/2017             NIBP: (!)  152/98    NIBP Mean: 127                Electronically Signed By: LEIGH Crenshaw CRNA  October 19, 2017  10:21 AM

## 2017-10-19 NOTE — ANESTHESIA PREPROCEDURE EVALUATION
Anesthesia Evaluation     . Pt has had prior anesthetic.            ROS/MED HX    ENT/Pulmonary:     (+)asthma COPD, , . Other pulmonary disease hoarseness.    Neurologic:     (+)other neuro opoid depend    Cardiovascular:     (+) Dyslipidemia, hypertension----. : . . . :. .       METS/Exercise Tolerance:     Hematologic:         Musculoskeletal:         GI/Hepatic:     (+) GERD hepatitis type Other,       Renal/Genitourinary:         Endo:         Psychiatric:     (+) psychiatric history anxiety and depression      Infectious Disease:         Malignancy:         Other:    (+) H/O Chronic Pain,                   Physical Exam  Normal systems: cardiovascular and pulmonary    Airway   Mallampati: II  TM distance: >3 FB  Neck ROM: full    Dental     Cardiovascular       Pulmonary                     Anesthesia Plan      History & Physical Review  History and physical reviewed and following examination; no interval change.    ASA Status:  3 .    NPO Status:  > 8 hours    Plan for General and ETT with Intravenous and Propofol induction. Maintenance will be Balanced.    PONV prophylaxis:  Ondansetron (or other 5HT-3) and Dexamethasone or Solumedrol       Postoperative Care  Postoperative pain management:  IV analgesics.      Consents  Anesthetic plan, risks, benefits and alternatives discussed with:  Patient.  Use of blood products discussed: Yes.   Use of blood products discussed with Patient.  Consented to blood products.  .                          .

## 2017-10-19 NOTE — OP NOTE
General Surgery Operative Note    Pre-operative diagnosis:  Biliary Dyskinesia    Post-operative diagnosis: same   Procedure: Laparoscopic Cholecystectomy   Surgeon: Ney Jerry MD   Assistant(s): Kerry Rock PA-C -the physician assistant was medically necessary to assist in prepping, positioning, camera operation, retraction/exposure and closure of the port site.    Anesthesia: General    Estimated blood loss: 5 cc's   Drains placed: None   Complications:  None   Findings:   gallbladder with some adhesions, suggesting previous inflammation.       INDICATIONS FOR OPERATION: This is a patient with upper abdominal pain and a very high gallbladder ejection fraction.  The patient had seen a gastroenterologist, who recommended cholecystectomy.  Laparoscopic cholecystectomy was recommended.  The procedure, along with its risks and complications, was discussed in detail and the patient agreed to proceed.    DETAILS OF THE OPERATION: After informed consent the patient was taken to the operating room where she underwent satisfactory induction of general anesthesia.  The patient was then sterilely prepped and draped.  A supraumbilical skin incision was made using a skin knife.  The dissection was carried bluntly down to the fascia.  The fascia was opened using electrocautery and the Metcalf trocar was then inserted.  Pneumoperitoneum was achieved using CO2 insufflation, and under direct visualization  three 5 mm upper abdominal ports were placed.  The gallbladder was visualized and was grasped.  Some adhesions on its anterior surface were taken down using blunt dissection and electrocautery.  It was then pulled up over the liver and the cystic duct was exposed.  The cystic duct was skeletonized, triple clipped and divided.  The cystic artery was likewise triple clipped and divided, along with a posterior branch.  The gallbladder was then dissected away from the liver using electrocautery.  The gallbladder was  then placed in an Endo Catch bag and removed through the supraumbilical incision.  The gallbladder fossa was irrigated out.  There was excellent hemostasis and the clips were in good position.  The trocar sites were now infiltrated with half percent Marcaine with epinephrine.  The trochars were removed under direct visualization.  The supraumbilical fascia was then closed using 0 Vicryl suture.  Skin incisions were closed using 4-0 subcuticular Vicryl followed by Steri-Strips.    The patient was transferred to the recovery room in satisfactory condition.  Sponge and needle counts were correct at the close of the case.      Specimens:   ID Type Source Tests Collected by Time Destination   A : Gallbladder and contents Tissue Gallbladder and Contents SURGICAL PATHOLOGY EXAM Ney Jerry MD 10/19/2017  9:52 AM            Ney Jerry MD

## 2017-10-19 NOTE — IP AVS SNAPSHOT
MRN:[id number]                      After Visit Summary   10/19/2017    Swetha Patterson    MRN: [id number]           Thank you!     Thank you for choosing [facility] for your care. Our goal is always to provide you with excellent care. Hearing back from our patients is one way we can continue to improve our services. Please take a few minutes to complete the written survey that you may receive in the mail after you visit. If you would like to speak to someone directly about your visit please contact Patient Relations at [phone]. Thank you!          Patient Information     Date Of Birth          1970        About your hospital stay     You were admitted on:  October 19, 2017 You last received care in the:  [facility] PreOP/PostOP    You were discharged on:  October 19, 2017       Who to Call     For medical emergencies, please call 911.  For non-urgent questions about your medical care, please call your primary care provider or clinic, [phone]  For questions related to your surgery, please call your surgery clinic        Attending Provider     Provider Specialty    Ney Jerry MD General Surgery       Primary Care Provider Office Phone # Fax #    Roro Chawla [phone]      Your next 10 appointments already scheduled     Oct 24, 2017 11:00 AM CDT   SHORT with Roro Chawla MD   [facility] ([facility]    [address] [place] [id number] [phone]            Oct 25, 2017 12:40 PM CDT   (Arrive by 12:25 PM)   CT CHEST W/O CONTRAST with UCCT1   [facility] ([facility])    [address] [address] [phone]           Please bring any scans or X-rays taken at other hospitals, if similar tests were done. Also bring a list of your medicines, including vitamins, minerals and  over-the-counter drugs. It is safest to leave personal items at home.  Be sure to tell your doctor:   If you have any allergies.   If there s any chance you are pregnant.   If you are breastfeeding.   If you have any special needs.  You do not need to do anything special to prepare.  Please wear loose clothing, such as a sweat suit or jogging clothes. Avoid snaps, zippers and other metal. We may ask you to undress and put on a hospital gown.            Oct 25, 2017  1:00 PM CDT   FULL PULMONARY FUNCTION with  PFL ALDEN   [facility] ([facility])    [address] [facility]  [address] [facility] [address]   [phone]            Oct 25, 2017  2:00 PM CDT   (Arrive by 1:45 PM)   Return Visit with Nita Williamson MD   [facility] ([facility])    [address] [place] [address]   [phone]            Oct 26, 2017 10:15 AM CDT   [facility]   [facility]    [address]  [address]  [facility] [id number] [phone]            Nov 09, 2017  1:00 PM CST   (Arrive by 12:30 PM)   NEW NECK with Agustin Palomares MD   [facility] ([facility])    [address] [place] [id number] [phone]              Further instructions from your care team       HOME CARE FOLLOWING LAPAROSCOPIC CHOLECYSTECTOMY  DELANO Galaviz E. Gavin, N. Guttormson, D. Maurer, DANIEL Miller    INCISIONAL CARE:  Replace the bandage over your incisions until all drainage stops, or if more comfortable to have in place.  If present, leave the steri-strips (white paper tapes) in place for 14 days after surgery.  If Dermabond (a type of skin glue) is present, leave in place until it wears/flakes off.     BATHING:  Avoid baths for 1 week  after surgery.  Showers are okay.  You may wash your hair at any time.  Gently pat your incisions dry after bathing.    ACTIVITY:  Light Activity -- you may immediately be up and about as tolerated.  Driving -- you may drive when comfortable and off narcotic pain medications.  Light Work -- resume when comfortable off pain medications.  (If you can drive, you probably can work.)  Strenuous Work/Activity -- limit lifting to 20 pounds for 1 week.  Progressively increase with time.  Active Sports (running, biking, etc.) -- cautiously resume after 2 weeks.    DISCOMFORT:  Use pain medications as prescribed by your surgeon.  Take the pain medication with some food, when possible, to minimize side effects.  Intermittent use of ice packs at the incision sites may help during the first 48 hours.  Expect gradual improvement.  You may experience shoulder pain, which is due to the air placed within your abdomen during the procedure.  This is temporary and usually passes within 2 days.    DIET:  Drink plenty of fluids.  While taking pain medications, increase dietary fiber or add a fiber supplementation like Metamucil or Citrucel to help prevent constipation - a possible side effect of pain medications.  It is not uncommon to experience some bowel changes (loose stools or constipation) after surgery.  Your body has to adapt to you no longer having a gall bladder.  To help minimize this side effect, avoid fatty foods for the first week after surgery.  You may then slowly increase the amount of fatty foods in your diet.      NAUSEA:  If nauseated from the anesthetic/pain meds; rest in bed, get up cautiously with assistance, and drink clear liquids (juice, tea, broth).    RETURN APPOINTMENT:  Schedule a follow-up visit 2-3 weeks post-op.  Office Phone:  [phone]     CONTACT US IF THE FOLLOWING DEVELOPS:   1. A fever that is above 101     2. If there is a large amount of drainage, bleeding, or swelling.   3. Severe pain that is  not relieved by your prescription.   4. Drainage that is thick, cloudy, yellow, green or white.   5. Any other questions not answered by  Frequently Asked Questions  sheet.      FREQUENTLY ASKED QUESTIONS:    Q:  How should my incision look?    A:  Normally your incision will appear slightly swollen with light redness directly along the incision itself as it heals.  It may feel like a bump or ridge as the healing/scarring happens, and over time (3-4 months) this bump or ridge feeling should slowly go away.  In general, clear or pink watery drainage can be normal at first as your incision heals, but should decrease over time.    Q:  How do I know if my incision is infected?  A:  Look at your incision for signs of infection, like redness around the incision spreading to surrounding skin, or drainage of cloudy or foul-smelling drainage.  If you feel warm, check your temperature to see if you are running a fever.    **If any of these things occur, please notify the nurse at our office.  We may need you to come into the office for an incision check.      Q:  How do I take care of my incision?  A:  If you have a dressing in place - Starting the day after surgery, replace the dressing 1-2 times a day until there is no further drainage from the incision.  At that time, a dressing is no longer needed.  Try to minimize tape on the skin if irritation is occurring at the tape sites.  If you have significant irritation from tape on the skin, please call the office to discuss other method of dressing your incision.    Small pieces of tape called  steri-strips  may be present directly overlying your incision; these may be removed 10 days after surgery unless otherwise specified by your surgeon.  If these tapes start to loosen at the ends, you may trim them back until they fall off or are removed.    A:  If you had  Dermabond  tissue glue used as a dressing (this causes your incision to look shiny with a clear covering over it) -  This type of dressing wears off with time and does not require more dressings over the top unless it is draining around the glue as it wears off.  Do not apply ointments or lotions over the incisions until the glue has completely worn off.    Q:  There is a piece of tape or a sticky  lead  still on my skin.  Can I remove this?  A:  Sometimes the sticky  leads  used for monitoring during surgery or for evaluation in the emergency department are not all removed while you are in the hospital.  These sometimes have a tab or metal dot on them.  You can easily remove these on your own, like taking off a band-aid.  If there is a gel substance under the  lead , simply wipe/clean it off with a washcloth or paper towel.      Q:  What can I do to minimize constipation (very hard stools, or lack of stools)?  A:  Stay well hydrated.  Increase your dietary fiber intake or take a fiber supplement -with plenty of water.  Walk around frequently.  You may consider an over-the-counter stool-softener.  Your Pharmacist can assist you with choosing one that is stocked at your pharmacy.  Constipation is also one of the most common side effects of pain medication.  If you are using pain medication, be pro-active and try to PREVENT problems with constipation by taking the steps above BEFORE constipation becomes a problem.    Q:  What do I do if I need more pain medications?  A:  Call the office to receive refills.  Be aware that certain pain meds cannot be called into a pharmacy and actually require a paper prescription.  A change may be made in your pain med as you progress thru your recovery period or if you have side effects to certain meds.    --Pain meds are NOT refilled after 5pm on weekdays, and NOT AT ALL on the weekends, so please look ahead to prevent problems.      Q:  Why am I having a hard time sleeping now that I am at home?  A:  Many medications you receive while you are in the hospital can impact your sleep for a number of  days after your surgery/hospitalization.  Decreased level of activity and naps during the day may also make sleeping at night difficult.  Try to minimize day-time naps, and get up frequently during the day to walk around your home during your recovery time.  Sleep aides may be of some help, but are not recommended for long-term use.      Q:  I am having some back discomfort.  What should I do?  A:  This may be related to certain positioning that was required for your surgery, extended periods of time in bed, or other changes in your overall activity level.  You may try ice, heat, acetaminophen, or ibuprofen to treat this temporarily.  Note that many pain medications have acetaminophen in them and would state this on the prescription bottle.  Be sure not to exceed the maximum of 4000mg per day of acetaminophen.     **If the pain you are having does not resolve, is severe, or is a flare of back pain you have had on other occasions prior to surgery, please contact your primary physician for further recommendations or for an appointment to be examined at their office.    Q:  Why am I having headaches?  A:  Headaches can be caused by many things:  caffeine withdrawal, use of pain meds, dehydration, high blood pressure, lack of sleep, over-activity/exhaustion, flare-up of usual migraine headaches.  If you feel this is related to muscle tension (a band-like feeling around the head, or a pressure at the low-back of the head) you may try ice or heat to this area.  You may need to drink more fluids (try electrolyte drink like Gatorade), rest, or take your usual migraine medications.   **If your headaches do not resolve, worsen, are accompanied by other symptoms, or if your blood pressure is high, please call your primary physician for recommendation and/or examination.    Q:  I am unable to urinate.  What do I do?  A:  A small percentage of people can have difficulty urinating initially after surgery.  This includes being  able to urinate only a very small amount at a time and feeling discomfort or pressure in the very low abdomen.  This is called  urinary retention , and is actually an urgent situation.  Proceed to your nearest Emergency department for evaluation (not an Urgent Care Center).  Sometimes the bladder does not work correctly after certain medications you receive during surgery, or related to certain procedures.  You may need to have a catheter placed until your bladder recovers.  When planning to go to an Emergency department, it may help to call the ER to let them know you are coming in for this problem after a surgery.  This may help you get in quicker to be evaluated.  **If you have symptoms of a urinary tract infection, please contact your primary physician for the proper evaluation and treatment.    If you have other questions, please call the office Monday thru Friday between 8am and 5pm to discuss with the nurse or physician assistant.  #[phone]    There is a surgeon ON CALL on weekday evenings and over the weekend in case of urgent need only, and may be contacted at the same number.    If you are having an emergency, call 911 or proceed to your nearest emergency department.    GENERAL ANESTHESIA OR SEDATION ADULT DISCHARGE INSTRUCTIONS   SPECIAL PRECAUTIONS FOR 24 HOURS AFTER SURGERY    IT IS NOT UNUSUAL TO FEEL LIGHT-HEADED OR FAINT, UP TO 24 HOURS AFTER SURGERY OR WHILE TAKING PAIN MEDICATION.  IF YOU HAVE THESE SYMPTOMS; SIT FOR A FEW MINUTES BEFORE STANDING AND HAVE SOMEONE ASSIST YOU WHEN YOU GET UP TO WALK OR USE THE BATHROOM.    YOU SHOULD REST AND RELAX FOR THE NEXT 24 HOURS AND YOU MUST MAKE ARRANGEMENTS TO HAVE SOMEONE STAY WITH YOU FOR AT LEAST 24 HOURS AFTER YOUR DISCHARGE.  AVOID HAZARDOUS AND STRENUOUS ACTIVITIES.  DO NOT MAKE IMPORTANT DECISIONS FOR 24 HOURS.    DO NOT DRIVE ANY VEHICLE OR OPERATE MECHANICAL EQUIPMENT FOR 24 HOURS FOLLOWING THE END OF YOUR SURGERY.  EVEN THOUGH YOU MAY FEEL  NORMAL, YOUR REACTIONS MAY BE AFFECTED BY THE MEDICATION YOU HAVE RECEIVED.    DO NOT DRINK ALCOHOLIC BEVERAGES FOR 24 HOURS FOLLOWING YOUR SURGERY.    DRINK CLEAR LIQUIDS (APPLE JUICE, GINGER ALE, 7-UP, BROTH, ETC.).  PROGRESS TO YOUR REGULAR DIET AS YOU FEEL ABLE.    YOU MAY HAVE A DRY MOUTH, A SORE THROAT, MUSCLES ACHES OR TROUBLE SLEEPING.  THESE SHOULD GO AWAY AFTER 24 HOURS.    CALL YOUR DOCTOR FOR ANY OF THE FOLLOWING:  SIGNS OF INFECTION (FEVER, GROWING TENDERNESS AT THE SURGERY SITE, A LARGE AMOUNT OF DRAINAGE OR BLEEDING, SEVERE PAIN, FOUL-SMELLING DRAINAGE, REDNESS OR SWELLING.    IT HAS BEEN OVER 8 TO 10 HOURS SINCE SURGERY AND YOU ARE STILL NOT ABLE TO URINATE (PASS WATER).     Transderm Scop (Scopolamine) Patch    The Transderm Scop patch placed behind your ear helps to prevent nausea and vomiting associated with the use of anesthesia and certain analgesics used during or after many types surgery.  You will need to remove this patch after 3 days.  However, it may be removed sooner if you are no longer concerned about nausea or vomiting.      The most common side effects:  ?  dryness of the mouth  ?  drowsiness  ?  blurred vision  ?  dilation of pupils  ?  disorientation, memory disturbances and/or confusion  ?  dizziness  ?  restlessness  ?  hallucinations  ?  difficulty urinating  ?  skin rash  ?  red or painful eyes    Avoid drinking alcohol while using Transderm Scop patch.  Be careful about driving or operating any machinery while using this medication since it may make you drowsy.  In the unlikely event that you experience pain in the eye, which may be accompanied by widening of the pupil, eye redness and blurred vision, remove the Transderm Scop Patch immediately and consult your doctor.    Removal of Transderm Scop Patch:  To remove the patch simply peel it off your skin.  Be sure to wash your hands and the area behind your ear thoroughly with soap and water.  The Transderm Scop Patch will  "continue to have some active ingredient after use.  Therefore, to avoid accidental contact or ingestion by children or pets, fold the used patch in half with the sticky side together and dispose in the trash out of reach of children and pets.      You received Toradol, an IV form of ibuprofen (Motrin) at 10:25 am.  Do not take any ibuprofen products until 4:25 pm.  Maximum acetaminophen (Tylenol) dose from all sources should not exceed 4 grams (4000 mg) per day.  You had 10 mg oxycodone at 1:00pm today.                  Pending Results     Date and Time Order Name Status Description    10/19/2017 0953 Surgical pathology exam In process             Admission Information     Date & Time Provider Department Dept. Phone    10/19/2017 Ney Jerry MD [facility] PreOP/PostOP [phone]      Your Vitals Were     Blood Pressure Temperature Respirations Height Weight Last Period    128/49 97.1  F (36.2  C) (Temporal) 10 1.626 m (5' 4.02\") 92.1 kg (203 lb) 04/15/2016    Pulse Oximetry BMI (Body Mass Index)                97% 34.83 kg/m2          [employer] Information     [employer] lets you send messages to your doctor, view your test results, renew your prescriptions, schedule appointments and more. To sign up, go to [email or web address] . Click on \"Log in\" on the left side of the screen, which will take you to the Welcome page. Then click on \"Sign up Now\" on the right side of the page.     You will be asked to enter the access code listed below, as well as some personal information. Please follow the directions to create your username and password.     Your access code is: [email or web address]  Expires: 2018  7:42 PM     Your access code will  in 90 days. If you need help or a new code, please call your [facility] clinic or [phone].        Care EveryWhere ID     This is your Care EveryWhere ID. This could be used by other organizations to access your [facility] medical records  [id number]        Equal Access " to Services     [facility]: Hadii skinny Jin, waaxda luqadaha, qaybta kaalmada alexandru, caryl saunders. So [facility] [phone].    ATENCIÓN: Si habla yong, tiene a vargas disposición servicios gratuitos de asistencia lingüística. Llame al [phone].    We comply with applicable federal civil rights laws and [place] laws. We do not discriminate on the basis of race, color, national origin, age, disability, sex, sexual orientation, or gender identity.               Review of your medicines      CONTINUE these medicines which have NOT CHANGED        Dose / Directions    albuterol 108 (90 BASE) MCG/ACT Inhaler   Commonly known as:  VENTOLIN HFA   Used for:  Shortness of breath        Dose:  2 puff   Inhale 2 puffs into the lungs every 6 hours   Quantity:  3 Inhaler   Refills:  3       amLODIPine 10 MG tablet   Commonly known as:  NORVASC   Used for:  HTN, goal below 140/90        Dose:  10 mg   Take 1 tablet (10 mg) by mouth daily   Quantity:  90 tablet   Refills:  1       atorvastatin 80 MG tablet   Commonly known as:  LIPITOR   Used for:  Family history of ischemic heart disease, Hyperlipidemia LDL goal <130        Dose:  80 mg   Take 1 tablet (80 mg) by mouth daily   Quantity:  90 tablet   Refills:  1       budesonide-formoterol 160-4.5 MCG/ACT Inhaler   Commonly known as:  SYMBICORT   Used for:  Uncomplicated asthma, unspecified asthma severity        Dose:  2 puff   Inhale 2 puffs into the lungs 2 times daily   Quantity:  3 Inhaler   Refills:  3       cetirizine HCl 10 MG Caps   Used for:  Chronic rhinitis        Dose:  1 capsule   Take 1 capsule by mouth daily   Quantity:  90 capsule   Refills:  3       chlorhexidine 4 % liquid   Commonly known as:  HIBICLENS   Used for:  Hidradenitis suppurativa        Wash ab, groin, thighs daily in shower DO NOT PUT ON FACE   Quantity:  946 mL   Refills:  3       desonide 0.05 % cream   Commonly known as:  DESOWEN   Used for:   Localized pruritus        Apply sparingly to affected area three times daily as needed.   Quantity:  60 g   Refills:  0       diazepam 10 MG tablet   Commonly known as:  VALIUM   Used for:  Chest wall pain, Muscle pain, Anxiety        Dose:  10 mg   Take 1 tablet (10 mg) by mouth every 8 hours as needed for anxiety or sleep For sleep, anxiety, and muscle tension.   Quantity:  90 tablet   Refills:  0       doxepin 10 MG capsule   Commonly known as:  SINEquan   Used for:  Persistent insomnia        Dose:  10 mg   Take 1 capsule (10 mg) by mouth At Bedtime   Quantity:  30 capsule   Refills:  1       escitalopram 20 MG tablet   Commonly known as:  LEXAPRO   Used for:  Anxiety        Dose:  20 mg   Take 1 tablet (20 mg) by mouth daily Increased dose.   Quantity:  90 tablet   Refills:  1       fluticasone 50 MCG/ACT spray   Commonly known as:  FLONASE   Used for:  Severe chronic obstructive pulmonary disease (H)        Dose:  2 spray   Spray 2 sprays into both nostrils daily   Quantity:  16 g   Refills:  11       furosemide 20 MG tablet   Commonly known as:  LASIX   Used for:  Bilateral leg edema        Dose:  20 mg   Take 1 tablet (20 mg) by mouth daily as needed   Quantity:  90 tablet   Refills:  1       hydrOXYzine 50 MG tablet   Commonly known as:  ATARAX   Used for:  Anxiety        Dose:  50 mg   Take 1 tablet (50 mg) by mouth 4 times daily as needed for anxiety Increased dose. For anxiety   Quantity:  120 tablet   Refills:  0       ipratropium - albuterol 0.5 mg/2.5 mg/3 mL 0.5-2.5 (3) MG/3ML neb solution   Commonly known as:  DUONEB   Used for:  Uncomplicated asthma, unspecified asthma severity        Dose:  1 vial   Take 1 vial (3 mLs) by nebulization every 6 hours   Quantity:  360 mL   Refills:  0       lisinopril 20 MG tablet   Commonly known as:  PRINIVIL/ZESTRIL   Used for:  Essential hypertension with goal blood pressure less than 140/90        Dose:  20 mg   Take 1 tablet (20 mg) by mouth daily   Quantity:   90 tablet   Refills:  1       montelukast 10 MG tablet   Commonly known as:  SINGULAIR   Used for:  Dust allergy, Severe chronic obstructive pulmonary disease (H)        Dose:  10 mg   Take 1 tablet (10 mg) by mouth At Bedtime   Quantity:  30 tablet   Refills:  1       NICORETTE 2 MG lozenge   Generic drug:  nicotine polacrilex        PLACE 1 LOZENGE INSIDE CHEEK Q HOUR PRF SMOKING CESSATION UTD   Refills:  1       omeprazole 20 MG CR capsule   Commonly known as:  priLOSEC   Used for:  Acute gastritis, presence of bleeding unspecified, unspecified gastritis type        Dose:  20 mg   Take 1 capsule (20 mg) by mouth 2 times daily   Quantity:  30 capsule   Refills:  0       ondansetron 8 MG tablet   Commonly known as:  ZOFRAN   Used for:  Nausea        Dose:  4-8 mg   Take 0.5-1 tablets (4-8 mg) by mouth every 8 hours as needed for nausea   Quantity:  30 tablet   Refills:  0       order for DME   Used for:  Chronic lung disease, Cough, Severe persistent asthma with acute exacerbation        Equipment being ordered: Nebulizer machine Length of need-lifetime   Quantity:  1 Device   Refills:  0       oxyCODONE 5 MG IR tablet   Commonly known as:  ROXICODONE   Used for:  Abdominal pain, epigastric        Dose:  5 mg   Take 1 tablet (5 mg) by mouth every 6 hours as needed for pain   Quantity:  35 tablet   Refills:  0       prochlorperazine 5 MG tablet   Commonly known as:  COMPAZINE   Used for:  Nausea        Dose:  5 mg   Take 1 tablet (5 mg) by mouth every 6 hours as needed for nausea or vomiting   Quantity:  90 tablet   Refills:  0       ranitidine 75 MG tablet   Commonly known as:  ZANTAC   Used for:  Uncomplicated asthma, unspecified asthma severity        Dose:  150 mg   Take 2 tablets (150 mg) by mouth daily   Quantity:  90 tablet   Refills:  1       tiotropium 18 MCG capsule   Commonly known as:  SPIRIVA HANDIHALER   Used for:  Other emphysema (H)        Inhale contents of one capsule daily.   Quantity:  30 capsule    Refills:  1       tiZANidine 4 MG tablet   Commonly known as:  ZANAFLEX        TK 1 T PO  TID PRF MUSCLE SPASM   Refills:  0       varenicline 0.5 MG X 11 & 1 MG X 42 tablet   Commonly known as:  CHANTIX STARTING MONTH PAK   Used for:  Encounter for smoking cessation counseling        Take 0.5 mg tab daily for 3 days, then 0.5 mg tab twice daily for 4 days, then 1 mg twice daily.   Quantity:  53 tablet   Refills:  0       venlafaxine 37.5 MG 24 hr capsule   Commonly known as:  EFFEXOR-XR   Used for:  Menopausal syndrome (hot flashes)        Take 1 capsule daily for 14 days, then take 2 capsules daily.   Quantity:  46 capsule   Refills:  0       VITAMIN D (CHOLECALCIFEROL) PO        Dose:  2000 Units   Take 2,000 Units by mouth daily   Refills:  0                Protect others around you: Learn how to safely use, store and throw away your medicines at www.disposemymeds.org.             Medication List: This is a list of all your medications and when to take them. Check marks below indicate your daily home schedule. Keep this list as a reference.      Medications           Morning Afternoon Evening Bedtime As Needed    albuterol 108 (90 BASE) MCG/ACT Inhaler   Commonly known as:  VENTOLIN HFA   Inhale 2 puffs into the lungs every 6 hours                                amLODIPine 10 MG tablet   Commonly known as:  NORVASC   Take 1 tablet (10 mg) by mouth daily                                atorvastatin 80 MG tablet   Commonly known as:  LIPITOR   Take 1 tablet (80 mg) by mouth daily                                budesonide-formoterol 160-4.5 MCG/ACT Inhaler   Commonly known as:  SYMBICORT   Inhale 2 puffs into the lungs 2 times daily                                cetirizine HCl 10 MG Caps   Take 1 capsule by mouth daily                                chlorhexidine 4 % liquid   Commonly known as:  HIBICLENS   Wash ab, groin, thighs daily in shower DO NOT PUT ON FACE                                desonide 0.05 %  cream   Commonly known as:  DESOWEN   Apply sparingly to affected area three times daily as needed.                                diazepam 10 MG tablet   Commonly known as:  VALIUM   Take 1 tablet (10 mg) by mouth every 8 hours as needed for anxiety or sleep For sleep, anxiety, and muscle tension.                                doxepin 10 MG capsule   Commonly known as:  SINEquan   Take 1 capsule (10 mg) by mouth At Bedtime                                escitalopram 20 MG tablet   Commonly known as:  LEXAPRO   Take 1 tablet (20 mg) by mouth daily Increased dose.                                fluticasone 50 MCG/ACT spray   Commonly known as:  FLONASE   Spray 2 sprays into both nostrils daily                                furosemide 20 MG tablet   Commonly known as:  LASIX   Take 1 tablet (20 mg) by mouth daily as needed                                hydrOXYzine 50 MG tablet   Commonly known as:  ATARAX   Take 1 tablet (50 mg) by mouth 4 times daily as needed for anxiety Increased dose. For anxiety                                ipratropium - albuterol 0.5 mg/2.5 mg/3 mL 0.5-2.5 (3) MG/3ML neb solution   Commonly known as:  DUONEB   Take 1 vial (3 mLs) by nebulization every 6 hours                                lisinopril 20 MG tablet   Commonly known as:  PRINIVIL/ZESTRIL   Take 1 tablet (20 mg) by mouth daily                                montelukast 10 MG tablet   Commonly known as:  SINGULAIR   Take 1 tablet (10 mg) by mouth At Bedtime                                NICORETTE 2 MG lozenge   PLACE 1 LOZENGE INSIDE CHEEK Q HOUR PRF SMOKING CESSATION UTD   Generic drug:  nicotine polacrilex                                omeprazole 20 MG CR capsule   Commonly known as:  priLOSEC   Take 1 capsule (20 mg) by mouth 2 times daily                                ondansetron 8 MG tablet   Commonly known as:  ZOFRAN   Take 0.5-1 tablets (4-8 mg) by mouth every 8 hours as needed for nausea                                 order for DME   Equipment being ordered: Nebulizer machine Length of need-lifetime                                oxyCODONE 5 MG IR tablet   Commonly known as:  ROXICODONE   Take 1 tablet (5 mg) by mouth every 6 hours as needed for pain   Last time this was given:  10 mg on 10/19/2017 12:55 PM                                prochlorperazine 5 MG tablet   Commonly known as:  COMPAZINE   Take 1 tablet (5 mg) by mouth every 6 hours as needed for nausea or vomiting                                ranitidine 75 MG tablet   Commonly known as:  ZANTAC   Take 2 tablets (150 mg) by mouth daily                                tiotropium 18 MCG capsule   Commonly known as:  SPIRIVA HANDIHALER   Inhale contents of one capsule daily.                                tiZANidine 4 MG tablet   Commonly known as:  ZANAFLEX   TK 1 T PO  TID PRF MUSCLE SPASM                                varenicline 0.5 MG X 11 & 1 MG X 42 tablet   Commonly known as:  CHANTIX STARTING MONTH CHAPITO   Take 0.5 mg tab daily for 3 days, then 0.5 mg tab twice daily for 4 days, then 1 mg twice daily.                                venlafaxine 37.5 MG 24 hr capsule   Commonly known as:  EFFEXOR-XR   Take 1 capsule daily for 14 days, then take 2 capsules daily.                                VITAMIN D (CHOLECALCIFEROL) PO   Take 2,000 Units by mouth daily

## 2017-10-19 NOTE — ANESTHESIA POSTPROCEDURE EVALUATION
Patient: Swetha Patterson    Procedure(s):  LAPAROSCOPIC CHOLECYSTECTOMY - Wound Class: II-Clean Contaminated    Diagnosis:Bilary Dyskensia   Diagnosis Additional Information: Pre-operative diagnosis:  Biliary Dyskinesia   Post-operative diagnosis: same  Procedure: Laparoscopic Cholecystectomy  Surgeon: Ney Jerry MD  Assistant(s): Kerry Rock PA-C -the physician assistant was medically necessary to assist , in prepping, positioning, camera operation, retraction/exposure and closure of the port site.   Anesthesia: General   Estimated blood loss: 5 cc's  Drains placed: None  Complications:  None  Findings:   gallbladder with some adhesions, suggesting pre, vious inflammation.           Anesthesia Type:  General, ETT    Note:  Anesthesia Post Evaluation    Patient location during evaluation: PACU  Patient participation: Able to fully participate in evaluation  Level of consciousness: awake  Pain management: adequate  Airway patency: patent  Cardiovascular status: acceptable  Respiratory status: acceptable  Hydration status: acceptable  PONV: none     Anesthetic complications: None          Last vitals:  Vitals:    10/19/17 1120 10/19/17 1135 10/19/17 1150   BP: 127/76 114/68 113/70   Resp: 12 14 16   Temp:      SpO2: 95% 97% 94%         Electronically Signed By: Caio Christensen MD  October 19, 2017  12:00 PM

## 2017-10-19 NOTE — PROVIDER NOTIFICATION
Updated  re: pain and nausea despite 200 mcg Fentanyl, 1 mg Dilaudid, Zofran and 5 mg Compazine.  Orders received.

## 2017-10-19 NOTE — DISCHARGE INSTRUCTIONS
HOME CARE FOLLOWING LAPAROSCOPIC CHOLECYSTECTOMY  DELANO Galaviz E. Gavin, N. Guttormson, D. Maurer, SHAWNA Avalos, DANIEL Rain    INCISIONAL CARE:  Replace the bandage over your incisions until all drainage stops, or if more comfortable to have in place.  If present, leave the steri-strips (white paper tapes) in place for 14 days after surgery.  If Dermabond (a type of skin glue) is present, leave in place until it wears/flakes off.     BATHING:  Avoid baths for 1 week after surgery.  Showers are okay.  You may wash your hair at any time.  Gently pat your incisions dry after bathing.    ACTIVITY:  Light Activity -- you may immediately be up and about as tolerated.  Driving -- you may drive when comfortable and off narcotic pain medications.  Light Work -- resume when comfortable off pain medications.  (If you can drive, you probably can work.)  Strenuous Work/Activity -- limit lifting to 20 pounds for 1 week.  Progressively increase with time.  Active Sports (running, biking, etc.) -- cautiously resume after 2 weeks.    DISCOMFORT:  Use pain medications as prescribed by your surgeon.  Take the pain medication with some food, when possible, to minimize side effects.  Intermittent use of ice packs at the incision sites may help during the first 48 hours.  Expect gradual improvement.  You may experience shoulder pain, which is due to the air placed within your abdomen during the procedure.  This is temporary and usually passes within 2 days.    DIET:  Drink plenty of fluids.  While taking pain medications, increase dietary fiber or add a fiber supplementation like Metamucil or Citrucel to help prevent constipation - a possible side effect of pain medications.  It is not uncommon to experience some bowel changes (loose stools or constipation) after surgery.  Your body has to adapt to you no longer having a gall bladder.  To help minimize this side effect, avoid fatty foods for the first week after surgery.   You may then slowly increase the amount of fatty foods in your diet.      NAUSEA:  If nauseated from the anesthetic/pain meds; rest in bed, get up cautiously with assistance, and drink clear liquids (juice, tea, broth).    RETURN APPOINTMENT:  Schedule a follow-up visit 2-3 weeks post-op.  Office Phone:  [phone]     CONTACT US IF THE FOLLOWING DEVELOPS:   1. A fever that is above 101     2. If there is a large amount of drainage, bleeding, or swelling.   3. Severe pain that is not relieved by your prescription.   4. Drainage that is thick, cloudy, yellow, green or white.   5. Any other questions not answered by  Frequently Asked Questions  sheet.      FREQUENTLY ASKED QUESTIONS:    Q:  How should my incision look?    A:  Normally your incision will appear slightly swollen with light redness directly along the incision itself as it heals.  It may feel like a bump or ridge as the healing/scarring happens, and over time (3-4 months) this bump or ridge feeling should slowly go away.  In general, clear or pink watery drainage can be normal at first as your incision heals, but should decrease over time.    Q:  How do I know if my incision is infected?  A:  Look at your incision for signs of infection, like redness around the incision spreading to surrounding skin, or drainage of cloudy or foul-smelling drainage.  If you feel warm, check your temperature to see if you are running a fever.    **If any of these things occur, please notify the nurse at our office.  We may need you to come into the office for an incision check.      Q:  How do I take care of my incision?  A:  If you have a dressing in place - Starting the day after surgery, replace the dressing 1-2 times a day until there is no further drainage from the incision.  At that time, a dressing is no longer needed.  Try to minimize tape on the skin if irritation is occurring at the tape sites.  If you have significant irritation from tape on the skin, please  call the office to discuss other method of dressing your incision.    Small pieces of tape called  steri-strips  may be present directly overlying your incision; these may be removed 10 days after surgery unless otherwise specified by your surgeon.  If these tapes start to loosen at the ends, you may trim them back until they fall off or are removed.    A:  If you had  Dermabond  tissue glue used as a dressing (this causes your incision to look shiny with a clear covering over it) - This type of dressing wears off with time and does not require more dressings over the top unless it is draining around the glue as it wears off.  Do not apply ointments or lotions over the incisions until the glue has completely worn off.    Q:  There is a piece of tape or a sticky  lead  still on my skin.  Can I remove this?  A:  Sometimes the sticky  leads  used for monitoring during surgery or for evaluation in the emergency department are not all removed while you are in the hospital.  These sometimes have a tab or metal dot on them.  You can easily remove these on your own, like taking off a band-aid.  If there is a gel substance under the  lead , simply wipe/clean it off with a washcloth or paper towel.      Q:  What can I do to minimize constipation (very hard stools, or lack of stools)?  A:  Stay well hydrated.  Increase your dietary fiber intake or take a fiber supplement -with plenty of water.  Walk around frequently.  You may consider an over-the-counter stool-softener.  Your Pharmacist can assist you with choosing one that is stocked at your pharmacy.  Constipation is also one of the most common side effects of pain medication.  If you are using pain medication, be pro-active and try to PREVENT problems with constipation by taking the steps above BEFORE constipation becomes a problem.    Q:  What do I do if I need more pain medications?  A:  Call the office to receive refills.  Be aware that certain pain meds cannot be  called into a pharmacy and actually require a paper prescription.  A change may be made in your pain med as you progress thru your recovery period or if you have side effects to certain meds.    --Pain meds are NOT refilled after 5pm on weekdays, and NOT AT ALL on the weekends, so please look ahead to prevent problems.      Q:  Why am I having a hard time sleeping now that I am at home?  A:  Many medications you receive while you are in the hospital can impact your sleep for a number of days after your surgery/hospitalization.  Decreased level of activity and naps during the day may also make sleeping at night difficult.  Try to minimize day-time naps, and get up frequently during the day to walk around your home during your recovery time.  Sleep aides may be of some help, but are not recommended for long-term use.      Q:  I am having some back discomfort.  What should I do?  A:  This may be related to certain positioning that was required for your surgery, extended periods of time in bed, or other changes in your overall activity level.  You may try ice, heat, acetaminophen, or ibuprofen to treat this temporarily.  Note that many pain medications have acetaminophen in them and would state this on the prescription bottle.  Be sure not to exceed the maximum of 4000mg per day of acetaminophen.     **If the pain you are having does not resolve, is severe, or is a flare of back pain you have had on other occasions prior to surgery, please contact your primary physician for further recommendations or for an appointment to be examined at their office.    Q:  Why am I having headaches?  A:  Headaches can be caused by many things:  caffeine withdrawal, use of pain meds, dehydration, high blood pressure, lack of sleep, over-activity/exhaustion, flare-up of usual migraine headaches.  If you feel this is related to muscle tension (a band-like feeling around the head, or a pressure at the low-back of the head) you may try ice  or heat to this area.  You may need to drink more fluids (try electrolyte drink like Gatorade), rest, or take your usual migraine medications.   **If your headaches do not resolve, worsen, are accompanied by other symptoms, or if your blood pressure is high, please call your primary physician for recommendation and/or examination.    Q:  I am unable to urinate.  What do I do?  A:  A small percentage of people can have difficulty urinating initially after surgery.  This includes being able to urinate only a very small amount at a time and feeling discomfort or pressure in the very low abdomen.  This is called  urinary retention , and is actually an urgent situation.  Proceed to your nearest Emergency department for evaluation (not an Urgent Care Center).  Sometimes the bladder does not work correctly after certain medications you receive during surgery, or related to certain procedures.  You may need to have a catheter placed until your bladder recovers.  When planning to go to an Emergency department, it may help to call the ER to let them know you are coming in for this problem after a surgery.  This may help you get in quicker to be evaluated.  **If you have symptoms of a urinary tract infection, please contact your primary physician for the proper evaluation and treatment.    If you have other questions, please call the office Monday thru Friday between 8am and 5pm to discuss with the nurse or physician assistant.  #[phone]    There is a surgeon ON CALL on weekday evenings and over the weekend in case of urgent need only, and may be contacted at the same number.    If you are having an emergency, call 911 or proceed to your nearest emergency department.    GENERAL ANESTHESIA OR SEDATION ADULT DISCHARGE INSTRUCTIONS   SPECIAL PRECAUTIONS FOR 24 HOURS AFTER SURGERY    IT IS NOT UNUSUAL TO FEEL LIGHT-HEADED OR FAINT, UP TO 24 HOURS AFTER SURGERY OR WHILE TAKING PAIN MEDICATION.  IF YOU HAVE THESE SYMPTOMS;  SIT FOR A FEW MINUTES BEFORE STANDING AND HAVE SOMEONE ASSIST YOU WHEN YOU GET UP TO WALK OR USE THE BATHROOM.    YOU SHOULD REST AND RELAX FOR THE NEXT 24 HOURS AND YOU MUST MAKE ARRANGEMENTS TO HAVE SOMEONE STAY WITH YOU FOR AT LEAST 24 HOURS AFTER YOUR DISCHARGE.  AVOID HAZARDOUS AND STRENUOUS ACTIVITIES.  DO NOT MAKE IMPORTANT DECISIONS FOR 24 HOURS.    DO NOT DRIVE ANY VEHICLE OR OPERATE MECHANICAL EQUIPMENT FOR 24 HOURS FOLLOWING THE END OF YOUR SURGERY.  EVEN THOUGH YOU MAY FEEL NORMAL, YOUR REACTIONS MAY BE AFFECTED BY THE MEDICATION YOU HAVE RECEIVED.    DO NOT DRINK ALCOHOLIC BEVERAGES FOR 24 HOURS FOLLOWING YOUR SURGERY.    DRINK CLEAR LIQUIDS (APPLE JUICE, GINGER ALE, 7-UP, BROTH, ETC.).  PROGRESS TO YOUR REGULAR DIET AS YOU FEEL ABLE.    YOU MAY HAVE A DRY MOUTH, A SORE THROAT, MUSCLES ACHES OR TROUBLE SLEEPING.  THESE SHOULD GO AWAY AFTER 24 HOURS.    CALL YOUR DOCTOR FOR ANY OF THE FOLLOWING:  SIGNS OF INFECTION (FEVER, GROWING TENDERNESS AT THE SURGERY SITE, A LARGE AMOUNT OF DRAINAGE OR BLEEDING, SEVERE PAIN, FOUL-SMELLING DRAINAGE, REDNESS OR SWELLING.    IT HAS BEEN OVER 8 TO 10 HOURS SINCE SURGERY AND YOU ARE STILL NOT ABLE TO URINATE (PASS WATER).     Transderm Scop (Scopolamine) Patch    The Transderm Scop patch placed behind your ear helps to prevent nausea and vomiting associated with the use of anesthesia and certain analgesics used during or after many types surgery.  You will need to remove this patch after 3 days.  However, it may be removed sooner if you are no longer concerned about nausea or vomiting.      The most common side effects:  ?  dryness of the mouth  ?  drowsiness  ?  blurred vision  ?  dilation of pupils  ?  disorientation, memory disturbances and/or confusion  ?  dizziness  ?  restlessness  ?  hallucinations  ?  difficulty urinating  ?  skin rash  ?  red or painful eyes    Avoid drinking alcohol while using Transderm Scop patch.  Be careful about driving or operating any  machinery while using this medication since it may make you drowsy.  In the unlikely event that you experience pain in the eye, which may be accompanied by widening of the pupil, eye redness and blurred vision, remove the Transderm Scop Patch immediately and consult your doctor.    Removal of Transderm Scop Patch:  To remove the patch simply peel it off your skin.  Be sure to wash your hands and the area behind your ear thoroughly with soap and water.  The Transderm Scop Patch will continue to have some active ingredient after use.  Therefore, to avoid accidental contact or ingestion by children or pets, fold the used patch in half with the sticky side together and dispose in the trash out of reach of children and pets.      You received Toradol, an IV form of ibuprofen (Motrin) at 10:25 am.  Do not take any ibuprofen products until 4:25 pm.  Maximum acetaminophen (Tylenol) dose from all sources should not exceed 4 grams (4000 mg) per day.  You had 10 mg oxycodone at 1:00pm today.

## 2017-10-19 NOTE — IP AVS SNAPSHOT
[facility] PreOP/PostOP    [address]    [facility] [address]    Phone:  [phone]    Fax:  [phone]                                       After Visit Summary   10/19/2017    Swetha Patterson    MRN: [id number]           After Visit Summary Signature Page     I have received my discharge instructions, and my questions have been answered. I have discussed any challenges I see with this plan with the nurse or doctor.    ..........................................................................................................................................  Patient/Patient Representative Signature      ..........................................................................................................................................  Patient Representative Print Name and Relationship to Patient    ..................................................               ................................................  Date                                            Time    ..........................................................................................................................................  Reviewed by Signature/Title    ...................................................              ..............................................  Date                                                            Time

## 2017-10-20 LAB — COPATH REPORT: NORMAL

## 2017-10-21 ENCOUNTER — APPOINTMENT (OUTPATIENT)
Dept: CT IMAGING | Facility: CLINIC | Age: 47
End: 2017-10-21
Attending: INTERNAL MEDICINE
Payer: MEDICARE

## 2017-10-21 ENCOUNTER — HOSPITAL ENCOUNTER (EMERGENCY)
Facility: CLINIC | Age: 47
Discharge: HOME | End: 2017-10-21
Attending: INTERNAL MEDICINE | Admitting: INTERNAL MEDICINE
Payer: MEDICARE

## 2017-10-21 VITALS
HEART RATE: 62 BPM | RESPIRATION RATE: 16 BRPM | OXYGEN SATURATION: 100 % | TEMPERATURE: 97.7 F | SYSTOLIC BLOOD PRESSURE: 113 MMHG | DIASTOLIC BLOOD PRESSURE: 89 MMHG

## 2017-10-21 DIAGNOSIS — R10.13 ABDOMINAL PAIN, EPIGASTRIC: ICD-10-CM

## 2017-10-21 LAB
ALBUMIN SERPL-MCNC: 3.2 G/DL (ref 3.4–5)
ALBUMIN UR-MCNC: NEGATIVE MG/DL
ALP SERPL-CCNC: 124 U/L (ref 40–150)
ALT SERPL W P-5'-P-CCNC: 49 U/L (ref 0–50)
ANION GAP SERPL CALCULATED.3IONS-SCNC: 8 MMOL/L (ref 3–14)
APPEARANCE UR: ABNORMAL
AST SERPL W P-5'-P-CCNC: 31 U/L (ref 0–45)
BACTERIA #/AREA URNS HPF: ABNORMAL /HPF
BASOPHILS # BLD AUTO: 0 10E9/L (ref 0–0.2)
BASOPHILS NFR BLD AUTO: 0.4 %
BILIRUB SERPL-MCNC: 0.4 MG/DL (ref 0.2–1.3)
BILIRUB UR QL STRIP: NEGATIVE
BUN SERPL-MCNC: 16 MG/DL (ref 7–30)
CALCIUM SERPL-MCNC: 8.7 MG/DL (ref 8.5–10.1)
CHLORIDE SERPL-SCNC: 108 MMOL/L (ref 94–109)
CO2 SERPL-SCNC: 23 MMOL/L (ref 20–32)
COLOR UR AUTO: YELLOW
CREAT SERPL-MCNC: 0.81 MG/DL (ref 0.52–1.04)
DIFFERENTIAL METHOD BLD: NORMAL
EOSINOPHIL # BLD AUTO: 0 10E9/L (ref 0–0.7)
EOSINOPHIL NFR BLD AUTO: 0 %
ERYTHROCYTE [DISTWIDTH] IN BLOOD BY AUTOMATED COUNT: 13.4 % (ref 10–15)
GFR SERPL CREATININE-BSD FRML MDRD: 75 ML/MIN/1.7M2
GLUCOSE SERPL-MCNC: 91 MG/DL (ref 70–99)
GLUCOSE UR STRIP-MCNC: NEGATIVE MG/DL
HCT VFR BLD AUTO: 41.9 % (ref 35–47)
HGB BLD-MCNC: 13.9 G/DL (ref 11.7–15.7)
HGB UR QL STRIP: NEGATIVE
IMM GRANULOCYTES # BLD: 0.1 10E9/L (ref 0–0.4)
IMM GRANULOCYTES NFR BLD: 0.5 %
KETONES UR STRIP-MCNC: NEGATIVE MG/DL
LACTATE BLD-SCNC: 1.4 MMOL/L (ref 0.7–2)
LEUKOCYTE ESTERASE UR QL STRIP: ABNORMAL
LIPASE SERPL-CCNC: 119 U/L (ref 73–393)
LYMPHOCYTES # BLD AUTO: 4 10E9/L (ref 0.8–5.3)
LYMPHOCYTES NFR BLD AUTO: 41.1 %
MCH RBC QN AUTO: 30 PG (ref 26.5–33)
MCHC RBC AUTO-ENTMCNC: 33.2 G/DL (ref 31.5–36.5)
MCV RBC AUTO: 90 FL (ref 78–100)
MONOCYTES # BLD AUTO: 0.7 10E9/L (ref 0–1.3)
MONOCYTES NFR BLD AUTO: 7.2 %
MUCOUS THREADS #/AREA URNS LPF: PRESENT /LPF
NEUTROPHILS # BLD AUTO: 5 10E9/L (ref 1.6–8.3)
NEUTROPHILS NFR BLD AUTO: 50.8 %
NITRATE UR QL: NEGATIVE
NRBC # BLD AUTO: 0 10*3/UL
NRBC BLD AUTO-RTO: 0 /100
PH UR STRIP: 6 PH (ref 5–7)
PLATELET # BLD AUTO: 353 10E9/L (ref 150–450)
POTASSIUM SERPL-SCNC: 3.8 MMOL/L (ref 3.4–5.3)
PROT SERPL-MCNC: 7.4 G/DL (ref 6.8–8.8)
RBC # BLD AUTO: 4.64 10E12/L (ref 3.8–5.2)
RBC #/AREA URNS AUTO: 1 /HPF (ref 0–2)
SODIUM SERPL-SCNC: 139 MMOL/L (ref 133–144)
SOURCE: ABNORMAL
SP GR UR STRIP: 1.01 (ref 1–1.03)
SQUAMOUS #/AREA URNS AUTO: 27 /HPF (ref 0–1)
UROBILINOGEN UR STRIP-MCNC: 0 MG/DL (ref 0–2)
WBC # BLD AUTO: 9.8 10E9/L (ref 4–11)
WBC #/AREA URNS AUTO: 7 /HPF (ref 0–2)

## 2017-10-21 PROCEDURE — 25000128 H RX IP 250 OP 636: Performed by: INTERNAL MEDICINE

## 2017-10-21 PROCEDURE — 96374 THER/PROPH/DIAG INJ IV PUSH: CPT | Mod: 59

## 2017-10-21 PROCEDURE — 83690 ASSAY OF LIPASE: CPT | Performed by: INTERNAL MEDICINE

## 2017-10-21 PROCEDURE — 83605 ASSAY OF LACTIC ACID: CPT | Performed by: INTERNAL MEDICINE

## 2017-10-21 PROCEDURE — 85025 COMPLETE CBC W/AUTO DIFF WBC: CPT | Performed by: INTERNAL MEDICINE

## 2017-10-21 PROCEDURE — 74177 CT ABD & PELVIS W/CONTRAST: CPT

## 2017-10-21 PROCEDURE — 96376 TX/PRO/DX INJ SAME DRUG ADON: CPT

## 2017-10-21 PROCEDURE — 80053 COMPREHEN METABOLIC PANEL: CPT | Performed by: INTERNAL MEDICINE

## 2017-10-21 PROCEDURE — 81001 URINALYSIS AUTO W/SCOPE: CPT | Performed by: INTERNAL MEDICINE

## 2017-10-21 PROCEDURE — 96375 TX/PRO/DX INJ NEW DRUG ADDON: CPT

## 2017-10-21 PROCEDURE — 99285 EMERGENCY DEPT VISIT HI MDM: CPT | Mod: 25

## 2017-10-21 RX ORDER — ONDANSETRON 2 MG/ML
4 INJECTION INTRAMUSCULAR; INTRAVENOUS ONCE
Status: COMPLETED | OUTPATIENT
Start: 2017-10-21 | End: 2017-10-21

## 2017-10-21 RX ORDER — ONDANSETRON 2 MG/ML
4 INJECTION INTRAMUSCULAR; INTRAVENOUS EVERY 30 MIN PRN
Status: DISCONTINUED | OUTPATIENT
Start: 2017-10-21 | End: 2017-10-21 | Stop reason: HOSPADM

## 2017-10-21 RX ORDER — IOPAMIDOL 755 MG/ML
500 INJECTION, SOLUTION INTRAVASCULAR ONCE
Status: COMPLETED | OUTPATIENT
Start: 2017-10-21 | End: 2017-10-21

## 2017-10-21 RX ORDER — HYDROMORPHONE HYDROCHLORIDE 1 MG/ML
0.5 INJECTION, SOLUTION INTRAMUSCULAR; INTRAVENOUS; SUBCUTANEOUS
Status: COMPLETED | OUTPATIENT
Start: 2017-10-21 | End: 2017-10-21

## 2017-10-21 RX ORDER — HYDROMORPHONE HYDROCHLORIDE 2 MG/1
2-4 TABLET ORAL EVERY 4 HOURS PRN
Qty: 16 TABLET | Refills: 0 | Status: SHIPPED | OUTPATIENT
Start: 2017-10-21 | End: 2017-11-07

## 2017-10-21 RX ADMIN — ONDANSETRON 4 MG: 2 INJECTION INTRAMUSCULAR; INTRAVENOUS at 14:58

## 2017-10-21 RX ADMIN — IOPAMIDOL 100 ML: 755 INJECTION, SOLUTION INTRAVENOUS at 13:35

## 2017-10-21 RX ADMIN — SODIUM CHLORIDE 65 ML: 9 INJECTION, SOLUTION INTRAVENOUS at 13:35

## 2017-10-21 RX ADMIN — HYDROMORPHONE HYDROCHLORIDE 0.5 MG: 1 INJECTION, SOLUTION INTRAMUSCULAR; INTRAVENOUS; SUBCUTANEOUS at 14:05

## 2017-10-21 RX ADMIN — ONDANSETRON 4 MG: 2 INJECTION INTRAMUSCULAR; INTRAVENOUS at 12:30

## 2017-10-21 RX ADMIN — HYDROMORPHONE HYDROCHLORIDE 1 MG: 1 INJECTION, SOLUTION INTRAMUSCULAR; INTRAVENOUS; SUBCUTANEOUS at 12:54

## 2017-10-21 RX ADMIN — HYDROMORPHONE HYDROCHLORIDE 0.5 MG: 1 INJECTION, SOLUTION INTRAMUSCULAR; INTRAVENOUS; SUBCUTANEOUS at 14:33

## 2017-10-21 RX ADMIN — HYDROMORPHONE HYDROCHLORIDE 0.5 MG: 1 INJECTION, SOLUTION INTRAMUSCULAR; INTRAVENOUS; SUBCUTANEOUS at 13:10

## 2017-10-21 NOTE — DISCHARGE INSTRUCTIONS
Discharge Instructions  Abdominal Pain    Abdominal pain can be caused by many things. Your evaluation today does not show the exact cause for your pain. Your doctor today has decided that it is unlikely your pain is due to a life threatening problem, or a problem requiring surgery or hospital admission. Sometimes those problems cannot be found right away, so it is very important that you follow up as directed.  Sometimes only the changes which occur over time allow the cause of your pain to be found.    ADULTS:  Return to the Emergency Department right away if:      You get an oral temperature above 102oF or as directed by your doctor.    You have blood in your stools (bright red or black, tarry stools).    You keep throwing up or can t drink liquids.    You see blood when you throw up.    You can t have a bowel movement or you can t pass gas.    Your stomach gets bloated or bigger.    Your skin or the whites of your eyes look yellow.    You faint.    You have bloody, frequent or painful urination.    You have new symptoms or anything that worries you.    CHILDREN:  Return to the Emergency Department right away if your child has any of the above-listed symptoms or the following:      Pushes your hand away or screams/cries when his/her belly is touched.    You notice your child is very fussy or weak.    Your child is very tired and is too tired to eat or drink.    Your child is dehydrated.  Signs of dehydration can be:  o Your infant has had no wet diapers in 4-5 hours.  o Your older child has not passed urine in 6-8 hours.  o Your infant or child starts to have dry mouth and lips, or no saliva or tears.    PREGNANT WOMEN:  Return to the Emergency Department right away if you have any of the above-listed symptoms or the following:      You have bleeding, leaking fluid or passing tissue from the vagina.    You have worse pain or cramping, or pain in your shoulder or back.    You have vomiting that will not  stop.    You have painful or bloody urination.    You have a temperature of 100oF or more.    Your baby is not moving as much as usual.    You faint.    You get a bad headache with or without eye problems and abdominal pain.    You have a convulsion or seizure.    You have unusual discharge from your vagina and abdominal pain.    Abdominal pain is pretty common during pregnancy.  Your pain may or may not be related to your pregnancy. You should follow-up closely with your OB doctor so they can evaluate you and your baby.  Until you follow-up with your regular doctor, do the following:       Avoid sex and do not put anything in your vagina.    Drink clear fluids.    Only take medications approved by your doctor.    MORE INFORMATION:    Appendicitis:  A possible cause of abdominal pain in any person who still has their appendix is acute appendicitis. Appendicitis is often hard to diagnose.  Testing does not always rule out early appendicitis or other causes of abdominal pain. Close follow-up with your doctor and re-evaluations may be needed to figure out the reason for your abdominal pain.    Follow-up:  It is very important that you make an appointment with your clinic and go to the appointment.  If you do not follow-up with your primary doctor, it may result in missing an important development which could result in permanent injury or disability and/or lasting pain.  If there is any problem keeping your appointment, call your doctor or return to the Emergency Department.    Medications:  Take your medications as directed by your doctor today.  Before using over-the-counter medications, ask your doctor and make sure to take the medications as directed.  If you have any questions about medications, ask your doctor.    Diet:  Resume your normal diet as much as possible, but do not eat fried, fatty or spicy foods while you have pain.  Do not drink alcohol or have caffeine.  Do not smoke tobacco.    Probiotics: If you  "have been given an antibiotic, you may want to also take a probiotic pill or eat yogurt with live cultures. Probiotics have \"good bacteria\" to help your intestines stay healthy. Studies have shown that probiotics help prevent diarrhea and other intestine problems (including C. diff infection) when you take antibiotics. You can buy these without a prescription in the pharmacy section of the store.     If you were given a prescription for medicine here today, be sure to read all of the information (including the package insert) that comes with your prescription.  This will include important information about the medicine, its side effects, and any warnings that you need to know about.  The pharmacist who fills the prescription can provide more information and answer questions you may have about the medicine.  If you have questions or concerns that the pharmacist cannot address, please call or return to the Emergency Department.         Opioid Medication Information    Pain medications are among the most commonly prescribed medicines, so we are including this information for all our patients. If you did not receive pain medication or get a prescription for pain medicine, you can ignore it.     You may have been given a prescription for an opioid (narcotic) pain medicine and/or have received a pain medicine while here in the Emergency Department. These medicines can make you drowsy or impaired. You must not drive, operate dangerous equipment, or engage in any other dangerous activities while taking these medications. If you drive while taking these medications, you could be arrested for DUI, or driving under the influence. Do not drink any alcohol while you are taking these medications.     Opioid pain medications can cause addiction. If you have a history of chemical dependency of any type, you are at a higher risk of becoming addicted to pain medications.  Only take these prescribed medications to treat your pain when " all other options have been tried. Take it for as short a time and as few doses as possible. Store your pain pills in a secure place, as they are frequently stolen and provide a dangerous opportunity for children or visitors in your house to start abusing these powerful medications. We will not replace any lost or stolen medicine.  As soon as your pain is better, you should flush all your remaining medication.     Many prescription pain medications contain Tylenol  (acetaminophen), including Vicodin , Tylenol #3 , Norco , Lortab , and Percocet .  You should not take any extra pills of Tylenol  if you are using these prescription medications or you can get very sick.  Do not ever take more than 3000 mg of acetaminophen in any 24 hour period.    All opioids tend to cause constipation. Drink plenty of water and eat foods that have a lot of fiber, such as fruits, vegetables, prune juice, apple juice and high fiber cereal.  Take a laxative if you don t move your bowels at least every other day. Miralax , Milk of Magnesia, Colace , or Senna  can be used to keep you regular.      Remember that you can always come back to the Emergency Department if you are not able to see your regular doctor in the amount of time listed above, if you get any new symptoms, or if there is anything that worries you.

## 2017-10-21 NOTE — ED AVS SNAPSHOT
[facility] Emergency Department    [address]    [facility] [address]    Phone:  [phone]    Fax:  [phone]                                       Swetha Patterson   MRN: [id number]    Department:  [facility] Emergency Department   Date of Visit:  10/21/2017           Patient Information     Date Of Birth          1970        Your diagnoses for this visit were:     Abdominal pain, epigastric        You were seen by Ana Dasilva MD.      Follow-up Information     Follow up with Ney Jerry MD In 2 days.    Specialty:  General Surgery    Why:  As needed    Contact information:    [address]  [facility] [address]  [phone]          Discharge Instructions       Discharge Instructions  Abdominal Pain    Abdominal pain can be caused by many things. Your evaluation today does not show the exact cause for your pain. Your doctor today has decided that it is unlikely your pain is due to a life threatening problem, or a problem requiring surgery or hospital admission. Sometimes those problems cannot be found right away, so it is very important that you follow up as directed.  Sometimes only the changes which occur over time allow the cause of your pain to be found.    ADULTS:  Return to the Emergency Department right away if:      You get an oral temperature above 102oF or as directed by your doctor.    You have blood in your stools (bright red or black, tarry stools).    You keep throwing up or can t drink liquids.    You see blood when you throw up.    You can t have a bowel movement or you can t pass gas.    Your stomach gets bloated or bigger.    Your skin or the whites of your eyes look yellow.    You faint.    You have bloody, frequent or painful urination.    You have new symptoms or anything that worries you.    CHILDREN:  Return to the Emergency Department right away if your child has any of the above-listed symptoms or the  following:      Pushes your hand away or screams/cries when his/her belly is touched.    You notice your child is very fussy or weak.    Your child is very tired and is too tired to eat or drink.    Your child is dehydrated.  Signs of dehydration can be:  o Your infant has had no wet diapers in 4-5 hours.  o Your older child has not passed urine in 6-8 hours.  o Your infant or child starts to have dry mouth and lips, or no saliva or tears.    PREGNANT WOMEN:  Return to the Emergency Department right away if you have any of the above-listed symptoms or the following:      You have bleeding, leaking fluid or passing tissue from the vagina.    You have worse pain or cramping, or pain in your shoulder or back.    You have vomiting that will not stop.    You have painful or bloody urination.    You have a temperature of 100oF or more.    Your baby is not moving as much as usual.    You faint.    You get a bad headache with or without eye problems and abdominal pain.    You have a convulsion or seizure.    You have unusual discharge from your vagina and abdominal pain.    Abdominal pain is pretty common during pregnancy.  Your pain may or may not be related to your pregnancy. You should follow-up closely with your OB doctor so they can evaluate you and your baby.  Until you follow-up with your regular doctor, do the following:       Avoid sex and do not put anything in your vagina.    Drink clear fluids.    Only take medications approved by your doctor.    MORE INFORMATION:    Appendicitis:  A possible cause of abdominal pain in any person who still has their appendix is acute appendicitis. Appendicitis is often hard to diagnose.  Testing does not always rule out early appendicitis or other causes of abdominal pain. Close follow-up with your doctor and re-evaluations may be needed to figure out the reason for your abdominal pain.    Follow-up:  It is very important that you make an appointment with your clinic and go to  "the appointment.  If you do not follow-up with your primary doctor, it may result in missing an important development which could result in permanent injury or disability and/or lasting pain.  If there is any problem keeping your appointment, call your doctor or return to the Emergency Department.    Medications:  Take your medications as directed by your doctor today.  Before using over-the-counter medications, ask your doctor and make sure to take the medications as directed.  If you have any questions about medications, ask your doctor.    Diet:  Resume your normal diet as much as possible, but do not eat fried, fatty or spicy foods while you have pain.  Do not drink alcohol or have caffeine.  Do not smoke tobacco.    Probiotics: If you have been given an antibiotic, you may want to also take a probiotic pill or eat yogurt with live cultures. Probiotics have \"good bacteria\" to help your intestines stay healthy. Studies have shown that probiotics help prevent diarrhea and other intestine problems (including C. diff infection) when you take antibiotics. You can buy these without a prescription in the pharmacy section of the store.     If you were given a prescription for medicine here today, be sure to read all of the information (including the package insert) that comes with your prescription.  This will include important information about the medicine, its side effects, and any warnings that you need to know about.  The pharmacist who fills the prescription can provide more information and answer questions you may have about the medicine.  If you have questions or concerns that the pharmacist cannot address, please call or return to the Emergency Department.         Opioid Medication Information    Pain medications are among the most commonly prescribed medicines, so we are including this information for all our patients. If you did not receive pain medication or get a prescription for pain medicine, you can " ignore it.     You may have been given a prescription for an opioid (narcotic) pain medicine and/or have received a pain medicine while here in the Emergency Department. These medicines can make you drowsy or impaired. You must not drive, operate dangerous equipment, or engage in any other dangerous activities while taking these medications. If you drive while taking these medications, you could be arrested for DUI, or driving under the influence. Do not drink any alcohol while you are taking these medications.     Opioid pain medications can cause addiction. If you have a history of chemical dependency of any type, you are at a higher risk of becoming addicted to pain medications.  Only take these prescribed medications to treat your pain when all other options have been tried. Take it for as short a time and as few doses as possible. Store your pain pills in a secure place, as they are frequently stolen and provide a dangerous opportunity for children or visitors in your house to start abusing these powerful medications. We will not replace any lost or stolen medicine.  As soon as your pain is better, you should flush all your remaining medication.     Many prescription pain medications contain Tylenol  (acetaminophen), including Vicodin , Tylenol #3 , Norco , Lortab , and Percocet .  You should not take any extra pills of Tylenol  if you are using these prescription medications or you can get very sick.  Do not ever take more than 3000 mg of acetaminophen in any 24 hour period.    All opioids tend to cause constipation. Drink plenty of water and eat foods that have a lot of fiber, such as fruits, vegetables, prune juice, apple juice and high fiber cereal.  Take a laxative if you don t move your bowels at least every other day. Miralax , Milk of Magnesia, Colace , or Senna  can be used to keep you regular.      Remember that you can always come back to the Emergency Department if you are not able to see your  regular doctor in the amount of time listed above, if you get any new symptoms, or if there is anything that worries you.        Future Appointments        Provider Department Dept Phone Center    10/24/2017 11:00 AM Roro Chawla MD [facility] [phone] [place]    10/25/2017 12:40 PM [facility] IM[facility] CT ROOM 1 [facility] -[phone] [facility]    10/25/2017 1:00 PM Pulmonary Function Lab [facility] Pulmonary Function Testing [phone] [facility]    10/25/2017 2:00 PM Nita Williamson MD [facility] [phone] [facility]    10/26/2017 10:15 AM Milena Joseph NP [facility] [phone] [place]    11/9/2017 1:00 PM Agustin Palomares MD [facility] [phone] [facility]      24 Hour Appointment Hotline       To make an appointment at any [facility], call [phone] ([phone]). If you don't have a family doctor or clinic, we will help you find one. [facility] are conveniently located to serve the needs of you and your family.             Review of your medicines      START taking        Dose / Directions Last dose taken    HYDROmorphone 2 MG tablet   Commonly known as:  DILAUDID   Dose:  2-4 mg   Quantity:  16 tablet        Take 1-2 tablets (2-4 mg) by mouth every 4 hours as needed for pain maximum 8 tablet(s) per day   Refills:  0        magnesium hydroxide 400 MG/5ML suspension   Commonly known as:  MILK OF MAGNESIA   Dose:  30 mL   Quantity:  360 mL        Take 30 mLs by mouth every 4 hours as needed for constipation or heartburn   Refills:  1          Our records show that you are taking the medicines listed below. If these are incorrect, please call your family doctor or clinic.        Dose / Directions Last dose taken    albuterol 108 (90 BASE) MCG/ACT Inhaler   Commonly known as:  VENTOLIN HFA   Dose:  2 puff   Quantity:  3 Inhaler        Inhale 2 puffs into the lungs every 6 hours    Refills:  3        amLODIPine 10 MG tablet   Commonly known as:  NORVASC   Dose:  10 mg   Quantity:  90 tablet        Take 1 tablet (10 mg) by mouth daily   Refills:  1        atorvastatin 80 MG tablet   Commonly known as:  LIPITOR   Dose:  80 mg   Quantity:  90 tablet        Take 1 tablet (80 mg) by mouth daily   Refills:  1        budesonide-formoterol 160-4.5 MCG/ACT Inhaler   Commonly known as:  SYMBICORT   Dose:  2 puff   Quantity:  3 Inhaler        Inhale 2 puffs into the lungs 2 times daily   Refills:  3        cetirizine HCl 10 MG Caps   Dose:  1 capsule   Quantity:  90 capsule        Take 1 capsule by mouth daily   Refills:  3        chlorhexidine 4 % liquid   Commonly known as:  HIBICLENS   Quantity:  946 mL        Wash ab, groin, thighs daily in shower DO NOT PUT ON FACE   Refills:  3        desonide 0.05 % cream   Commonly known as:  DESOWEN   Quantity:  60 g        Apply sparingly to affected area three times daily as needed.   Refills:  0        diazepam 10 MG tablet   Commonly known as:  VALIUM   Dose:  10 mg   Quantity:  90 tablet        Take 1 tablet (10 mg) by mouth every 8 hours as needed for anxiety or sleep For sleep, anxiety, and muscle tension.   Refills:  0        doxepin 10 MG capsule   Commonly known as:  SINEquan   Dose:  10 mg   Quantity:  30 capsule        Take 1 capsule (10 mg) by mouth At Bedtime   Refills:  1        escitalopram 20 MG tablet   Commonly known as:  LEXAPRO   Dose:  20 mg   Quantity:  90 tablet        Take 1 tablet (20 mg) by mouth daily Increased dose.   Refills:  1        fluticasone 50 MCG/ACT spray   Commonly known as:  FLONASE   Dose:  2 spray   Quantity:  16 g        Spray 2 sprays into both nostrils daily   Refills:  11        furosemide 20 MG tablet   Commonly known as:  LASIX   Dose:  20 mg   Quantity:  90 tablet        Take 1 tablet (20 mg) by mouth daily as needed   Refills:  1        hydrOXYzine 50 MG tablet   Commonly known as:  ATARAX   Dose:  50 mg    Quantity:  120 tablet        Take 1 tablet (50 mg) by mouth 4 times daily as needed for anxiety Increased dose. For anxiety   Refills:  0        ipratropium - albuterol 0.5 mg/2.5 mg/3 mL 0.5-2.5 (3) MG/3ML neb solution   Commonly known as:  DUONEB   Dose:  1 vial   Quantity:  360 mL        Take 1 vial (3 mLs) by nebulization every 6 hours   Refills:  0        lisinopril 20 MG tablet   Commonly known as:  PRINIVIL/ZESTRIL   Dose:  20 mg   Quantity:  90 tablet        Take 1 tablet (20 mg) by mouth daily   Refills:  1        montelukast 10 MG tablet   Commonly known as:  SINGULAIR   Dose:  10 mg   Quantity:  30 tablet        Take 1 tablet (10 mg) by mouth At Bedtime   Refills:  1        NICORETTE 2 MG lozenge   Generic drug:  nicotine polacrilex        PLACE 1 LOZENGE INSIDE CHEEK Q HOUR PRF SMOKING CESSATION UTD   Refills:  1        omeprazole 20 MG CR capsule   Commonly known as:  priLOSEC   Dose:  20 mg   Quantity:  30 capsule        Take 1 capsule (20 mg) by mouth 2 times daily   Refills:  0        ondansetron 8 MG tablet   Commonly known as:  ZOFRAN   Dose:  4-8 mg   Quantity:  30 tablet        Take 0.5-1 tablets (4-8 mg) by mouth every 8 hours as needed for nausea   Refills:  0        order for DME   Quantity:  1 Device        Equipment being ordered: Nebulizer machine Length of need-lifetime   Refills:  0        oxyCODONE 5 MG IR tablet   Commonly known as:  ROXICODONE   Dose:  5 mg   Quantity:  35 tablet        Take 1 tablet (5 mg) by mouth every 6 hours as needed for pain   Refills:  0        prochlorperazine 5 MG tablet   Commonly known as:  COMPAZINE   Dose:  5 mg   Quantity:  90 tablet        Take 1 tablet (5 mg) by mouth every 6 hours as needed for nausea or vomiting   Refills:  0        ranitidine 75 MG tablet   Commonly known as:  ZANTAC   Dose:  150 mg   Quantity:  90 tablet        Take 2 tablets (150 mg) by mouth daily   Refills:  1        tiotropium 18 MCG capsule   Commonly known as:  SPIRIVA  HANDIHALER   Quantity:  30 capsule        Inhale contents of one capsule daily.   Refills:  1        tiZANidine 4 MG tablet   Commonly known as:  ZANAFLEX        TK 1 T PO  TID PRF MUSCLE SPASM   Refills:  0        varenicline 0.5 MG X 11 & 1 MG X 42 tablet   Commonly known as:  CHANTIX STARTING MONTH CHAPITO   Quantity:  53 tablet        Take 0.5 mg tab daily for 3 days, then 0.5 mg tab twice daily for 4 days, then 1 mg twice daily.   Refills:  0        venlafaxine 37.5 MG 24 hr capsule   Commonly known as:  EFFEXOR-XR   Quantity:  46 capsule        Take 1 capsule daily for 14 days, then take 2 capsules daily.   Refills:  0        VITAMIN D (CHOLECALCIFEROL) PO   Dose:  2000 Units        Take 2,000 Units by mouth daily   Refills:  0                Prescriptions were sent or printed at these locations (2 Prescriptions)                   Other Prescriptions                Printed at Department/Unit printer (2 of 2)         HYDROmorphone (DILAUDID) 2 MG tablet               magnesium hydroxide (MILK OF MAGNESIA) 400 MG/5ML suspension                Procedures and tests performed during your visit     CBC with platelets differential    CT Abdomen Pelvis w Contrast    Comprehensive metabolic panel    Lactic acid whole blood    Lipase    UA with Microscopic reflex to Culture      Orders Needing Specimen Collection     None      Pending Results     No orders found from 10/19/2017 to 10/22/2017.            Pending Culture Results     No orders found from 10/19/2017 to 10/22/2017.            Pending Results Instructions     If you had any lab results that were not finalized at the time of your Discharge, you can call the ED Lab Result RN at [phone]. You will be contacted by this team for any positive Lab results or changes in treatment. The nurses are available 7 days a week from 10A to 6:30P.  You can leave a message 24 hours per day and they will return your call.        Test Results From Your Hospital Stay         10/21/2017  1:02 PM      Component Results     Component Value Ref Range & Units Status    WBC 9.8 4.0 - 11.0 10e9/L Final    RBC Count 4.64 3.8 - 5.2 10e12/L Final    Hemoglobin 13.9 11.7 - 15.7 g/dL Final    Hematocrit 41.9 35.0 - 47.0 % Final    MCV 90 78 - 100 fl Final    MCH 30.0 26.5 - 33.0 pg Final    MCHC 33.2 31.5 - 36.5 g/dL Final    RDW 13.4 10.0 - 15.0 % Final    Platelet Count 353 150 - 450 10e9/L Final    Diff Method Automated Method  Final    % Neutrophils 50.8 % Final    % Lymphocytes 41.1 % Final    % Monocytes 7.2 % Final    % Eosinophils 0.0 % Final    % Basophils 0.4 % Final    % Immature Granulocytes 0.5 % Final    Nucleated RBCs 0 0 /100 Final    Absolute Neutrophil 5.0 1.6 - 8.3 10e9/L Final    Absolute Lymphocytes 4.0 0.8 - 5.3 10e9/L Final    Absolute Monocytes 0.7 0.0 - 1.3 10e9/L Final    Absolute Eosinophils 0.0 0.0 - 0.7 10e9/L Final    Absolute Basophils 0.0 0.0 - 0.2 10e9/L Final    Abs Immature Granulocytes 0.1 0 - 0.4 10e9/L Final    Absolute Nucleated RBC 0.0  Final         10/21/2017  1:18 PM      Component Results     Component Value Ref Range & Units Status    Sodium 139 133 - 144 mmol/L Final    Potassium 3.8 3.4 - 5.3 mmol/L Final    Chloride 108 94 - 109 mmol/L Final    Carbon Dioxide 23 20 - 32 mmol/L Final    Anion Gap 8 3 - 14 mmol/L Final    Glucose 91 70 - 99 mg/dL Final    Urea Nitrogen 16 7 - 30 mg/dL Final    Creatinine 0.81 0.52 - 1.04 mg/dL Final    GFR Estimate 75 >60 mL/min/1.7m2 Final    Non  GFR Calc    GFR Estimate If Black >90 >60 mL/min/1.7m2 Final    African American GFR Calc    Calcium 8.7 8.5 - 10.1 mg/dL Final    Bilirubin Total 0.4 0.2 - 1.3 mg/dL Final    Albumin 3.2 (L) 3.4 - 5.0 g/dL Final    Protein Total 7.4 6.8 - 8.8 g/dL Final    Alkaline Phosphatase 124 40 - 150 U/L Final    ALT 49 0 - 50 U/L Final    AST 31 0 - 45 U/L Final         10/21/2017  1:08 PM      Component Results     Component Value Ref Range & Units Status    Lactic  Acid 1.4 0.7 - 2.0 mmol/L Final         10/21/2017  1:18 PM      Component Results     Component Value Ref Range & Units Status    Lipase 119 73 - 393 U/L Final         10/21/2017  2:02 PM      Narrative     CT ABDOMEN PELVIS WITH CONTRAST 10/21/2017 1:48 PM    TECHNIQUE: Images from diaphragm to pubic symphysis 100mL Isovue-370  IV contrast  Radiation dose for this scan was reduced using automated exposure  control, adjustment of the mA and/or kV according to patient size, or  iterative reconstruction technique.    HISTORY: Abdominal pain. Laparoscopic cholecystectomy October 19, 2017. Appendectomy at age 17    COMPARISON: 9/19/2017 CT abdomen pelvis    FINDINGS:   Abdomen and Pelvis: New cholecystectomy clips. No focal liver lesions.  Normal-appearing spleen, pancreas, adrenal glands. Tiny nonobstructing  kidney stone in the lower pole left kidney and right kidney  approximately 0.3 cm in size. No hydronephrosis. No periaortic or  pelvic adenopathy.    Postoperative changes along the anterior abdominal wall. No evidence  for abscess or free fluid in the abdomen or pelvis. Lung bases clear.  The bowel appears normal as seen with CT technique. Atherosclerotic  aorto -iliac vascular calcification.        Impression     IMPRESSION:   1. New cholecystectomy change.  2. No evidence for abscess, hematoma, or free fluid.  3. Tiny nonobstructing single kidney stone in the lower pole of right  and left kidneys.               RODRIGUE WHEELER MD         10/21/2017  2:27 PM      Component Results     Component Value Ref Range & Units Status    Color Urine Yellow  Final    Appearance Urine Slightly Cloudy  Final    Glucose Urine Negative NEG^Negative mg/dL Final    Bilirubin Urine Negative NEG^Negative Final    Ketones Urine Negative NEG^Negative mg/dL Final    Specific Gravity Urine 1.012 1.003 - 1.035 Final    Blood Urine Negative NEG^Negative Final    pH Urine 6.0 5.0 - 7.0 pH Final    Protein Albumin Urine Negative  NEG^Negative mg/dL Final    Urobilinogen mg/dL 0.0 0.0 - 2.0 mg/dL Final    Nitrite Urine Negative NEG^Negative Final    Leukocyte Esterase Urine Trace (A) NEG^Negative Final    Source Midstream Urine  Final    WBC Urine 7 (H) 0 - 2 /HPF Final    RBC Urine 1 0 - 2 /HPF Final    Bacteria Urine Moderate (A) NEG^Negative /HPF Final    Squamous Epithelial /HPF Urine 27 (H) 0 - 1 /HPF Final    Mucous Urine Present (A) NEG^Negative /LPF Final                Clinical Quality Measure: Blood Pressure Screening     Your blood pressure was checked while you were in the emergency department today. The last reading we obtained was  BP: 117/80 . Please read the guidelines below about what these numbers mean and what you should do about them.  If your systolic blood pressure (the top number) is less than 120 and your diastolic blood pressure (the bottom number) is less than 80, then your blood pressure is normal. There is nothing more that you need to do about it.  If your systolic blood pressure (the top number) is 120-139 or your diastolic blood pressure (the bottom number) is 80-89, your blood pressure may be higher than it should be. You should have your blood pressure rechecked within a year by a primary care provider.  If your systolic blood pressure (the top number) is 140 or greater or your diastolic blood pressure (the bottom number) is 90 or greater, you may have high blood pressure. High blood pressure is treatable, but if left untreated over time it can put you at risk for heart attack, stroke, or kidney failure. You should have your blood pressure rechecked by a primary care provider within the next 4 weeks.  If your provider in the emergency department today gave you specific instructions to follow-up with your doctor or provider even sooner than that, you should follow that instruction and not wait for up to 4 weeks for your follow-up visit.        Thank you for choosing [facility]       Thank you for choosing  "[place] for your care. Our goal is always to provide you with excellent care. Hearing back from our patients is one way we can continue to improve our services. Please take a few minutes to complete the written survey that you may receive in the mail after you visit with us. Thank you!        [email or web address] Information     [employer] lets you send messages to your doctor, view your test results, renew your prescriptions, schedule appointments and more. To sign up, go to [email or web address] . Click on \"Log in\" on the left side of the screen, which will take you to the Welcome page. Then click on \"Sign up Now\" on the right side of the page.     You will be asked to enter the access code listed below, as well as some personal information. Please follow the directions to create your username and password.     Your access code is: [email or web address]  Expires: 2018  7:42 PM     Your access code will  in 90 days. If you need help or a new code, please call your [place] clinic or [phone].        Care EveryWhere ID     This is your Care EveryWhere ID. This could be used by other organizations to access your [place] medical records  [id number]        Equal Access to Services     CHARITY MANDUJANO : Ramon Jin, amanda kendrick, akhil horvath, caryl saunders. So [facility] [place] [phone].    ATENCIÓN: Si habla español, tiene a vargas disposición servicios gratuitos de asistencia lingüística. Llame al [phone].    We comply with applicable federal civil rights laws and [place] laws. We do not discriminate on the basis of race, color, national origin, age, disability, sex, sexual orientation, or gender identity.            After Visit Summary       This is your record. Keep this with you and show to your community pharmacist(s) and doctor(s) at your next visit.                  "

## 2017-10-21 NOTE — ED PROVIDER NOTES
CHIEF COMPLAINT:  Abdominal pain.      HISTORY OF PRESENT ILLNESS:  Swetha Patterson is a 47-year-old woman is status post laparoscopic cholecystectomy 2 days ago and now returns complaining of intolerable abdominal pain.  She indicates this started right after her surgery and has persisted ever since.  She is taking her oxycodone at home without relief.  She also feels quite nauseated, but has not vomited.  She notes that she has only passed gas once since surgery and has not moved her bowels.  She has been able to eat just a little bit, but really does not have an appetite.  No fever that she is aware of and she has been taking her temperature every few hours.  She did call and talk with the on-call surgeon today prior to coming in.      PAST MEDICAL HISTORY:  GE reflux, anxiety, chronic pain on chronic opioids, hypertension, liver disease, previous appendectomy, tonsillectomy, and multiple other surgeries.      MEDICATIONS:  Albuterol, amlodipine, Lipitor, Symbicort, cetirizine, chlorhexidine, desonide, Valium, Sinequan, Lexapro, Flonase, Lasix, Atarax, DuoNeb, lisinopril, Singulair, Nicorette lozenges, Prilosec, Zofran, oxycodone, Compazine, Zantac, Spiriva, Zanaflex, Chantix, Effexor, vitamin D.      ALLERGIES:  Codeine, hydrocodone, sulfa, gabapentin.      FAMILY AND SOCIAL HISTORY:  The patient is here with her sister.      REVIEW OF SYSTEMS:  All other systems are negative.      INITIAL EXAMINATION:   IN GENERAL:  Alert adult woman, tearful in pain.   VITAL SIGNS:  Temperature 97.7, pulse 70, respirations 16, blood pressure 123/81, O2 sat 98% on room air.   HENT:  TMs normal, pharynx normal.     EYES:  Conjunctiva injected from crying.   LYMPHATIC:  No cervical lymphadenopathy.   NECK:  Full range of motion.   RESPIRATORY:  Lungs are clear bilaterally.   CARDIOVASCULAR:  Regular rate and rhythm without murmur.   GASTROINTESTINAL:  Incisions clean and dry.  There is diffuse tenderness throughout the abdomen  with pain referred at the epigastrium.  There is significant tenderness there, though no rigidity.   DERMATOLOGIC:  Skin is warm and dry, no rash.   NEUROLOGIC:  Normal motion of extremities.      EMERGENCY ROOM COURSE:  IV was established and Dilaudid was given for pain.  Laboratory tests were obtained.  CBC is unremarkable, comprehensive metabolic profile is normal with normal liver functions.  Lactate is normal, lipase is normal.  Urine is contaminated with epithelial cells, but otherwise unremarkable.  CT of the abdomen and pelvis with oral and IV contrast was obtained.  Radiology interpreted this as showing post-cholecystectomy changes, but no other significant abnormalities.      IMPRESSION:  Abdominal pain.  A 47-year-old woman who presents complaining of ongoing pain, status post recent laparoscopic cholecystectomy.  I discussed the case with Dr. Marie of General Surgery, on-call for Dr. Jerry.  He felt that we had probably adequately ruled out all significant pathological conditions at this point.  He noted that the patient does have chronic pain and thus may have tolerance to pain medication and felt that under the circumstances advancing her to Dilaudid tablets was reasonable.  I note that the patient also has not moved her bowels and some of this may be due to intra-abdominal pressure due to that.  I will have her start on milk of magnesia.  Dr. Marie felt that a HIDA scan could be considered as an outpatient if symptoms continue.  I have discharged the patient with her sister, return if worsening symptoms, contact Dr. Jerry on Monday with an update.         SAIRA BUTTERFIELD MD             D: 10/21/2017 15:24   T: 10/21/2017 16:50   MT: [id number]      Name:     KIT MILLS   MRN:      [id number]        Account:      [id number]   :      1970           Visit Date:   10/21/2017      Document: [id number]

## 2017-10-21 NOTE — ED AVS SNAPSHOT
[facility]    [address]    [facility] [address]    Phone:  [phone]    Fax:  [phone]                                       Swetha Patterson   MRN: [id number]    Department:  [facility]   Date of Visit:  10/21/2017           After Visit Summary Signature Page     I have received my discharge instructions, and my questions have been answered. I have discussed any challenges I see with this plan with the nurse or doctor.    ..........................................................................................................................................  Patient/Patient Representative Signature      ..........................................................................................................................................  Patient Representative Print Name and Relationship to Patient    ..................................................               ................................................  Date                                            Time    ..........................................................................................................................................  Reviewed by Signature/Title    ...................................................              ..............................................  Date                                                            Time

## 2017-10-21 NOTE — ED NOTES
Pt presents with abdominal pain, 2 days have gallbladder removal. Pt took oxycodone and aleve and it didn't help, also c/o nausea. Pt alert, oriented x3. ABCs intact.

## 2017-10-23 ENCOUNTER — TELEPHONE (OUTPATIENT)
Dept: SURGERY | Facility: CLINIC | Age: 47
End: 2017-10-23

## 2017-10-23 DIAGNOSIS — R10.11 RUQ ABDOMINAL PAIN: ICD-10-CM

## 2017-10-23 DIAGNOSIS — R11.2 NAUSEA AND VOMITING IN ADULT: ICD-10-CM

## 2017-10-23 DIAGNOSIS — Z90.49 STATUS POST CHOLECYSTECTOMY: Primary | ICD-10-CM

## 2017-10-23 NOTE — TELEPHONE ENCOUNTER
"  GENERAL SURGERY NURSE PHONE TRIAGE     Swetha Patterson      MRN# [id number]  AGE:  47 year old     YOB: 1970  [phone] (home)   Surgeon: Dr. Jerry  Surgical Assist:  Kerry Rock PA-C     Surgery type: Laparoscopic Cholecystectomy  Post-operative diagnosis: Biliary Dyskinesia      Surgery Date: October / 19 / 2017     POD: 4     CHIEF CONCERN:  Pain     HISTORY OF PRESENT ILLNESS:   Patient was seen in ED over the weekend (10/21/17) for pain, was prescribed Dilaudid. And MOM  ABDOMINAL PAIN  Location: generalized  Severity:  7 on a scale of 1-10- states she was up all night due to pain    Associated symptoms: vomiting- originally stated today, when asked how many times, patient unable to answer.    When later redirected, states she vomited 3 times yesterday, but has not today.    LBM yesterday- states she was constipated but also had diarrhea.   Reports, having difficulty with urination- \"now just a trickle\"  PLAN:   Will discuss with Dr. Jerry or clinic [facility].   Magaly Briceño RN  Update   Hida scan ordered.  Patient notified to call [phone] to schedule hida scan  Also to call Dr. Low for future pain medication.  Magaly Briceño RN      "

## 2017-10-23 NOTE — TELEPHONE ENCOUNTER
Name of caller: Patient    Reason for Call:  Pain, pt states she was seen in the ER over the weekend.  She is concerned that her amount of pain is not normal.    Surgeon:  Dr. Jerry    Recent Surgery:  Yes.    If yes, when & what type:  Lap Mayi last week      Best phone number to reach pt at is: [phone]  Ok to leave a message with medical info? Yes.    Pharmacy preferred (if calling for a refill): na

## 2017-10-24 ENCOUNTER — OFFICE VISIT (OUTPATIENT)
Dept: INTERNAL MEDICINE | Facility: CLINIC | Age: 47
End: 2017-10-24
Payer: MEDICARE

## 2017-10-24 VITALS — SYSTOLIC BLOOD PRESSURE: 110 MMHG | DIASTOLIC BLOOD PRESSURE: 64 MMHG

## 2017-10-24 DIAGNOSIS — F11.20 CONTINUOUS OPIOID DEPENDENCE (H): Chronic | ICD-10-CM

## 2017-10-24 DIAGNOSIS — R07.89 CHEST WALL PAIN: ICD-10-CM

## 2017-10-24 DIAGNOSIS — M79.10 MUSCLE PAIN: ICD-10-CM

## 2017-10-24 DIAGNOSIS — F41.9 ANXIETY: ICD-10-CM

## 2017-10-24 DIAGNOSIS — R10.84 ABDOMINAL PAIN, GENERALIZED: Primary | ICD-10-CM

## 2017-10-24 PROCEDURE — 99213 OFFICE O/P EST LOW 20 MIN: CPT | Performed by: INTERNAL MEDICINE

## 2017-10-24 RX ORDER — DIAZEPAM 10 MG
10 TABLET ORAL EVERY 8 HOURS PRN
Qty: 90 TABLET | Refills: 0 | Status: SHIPPED | OUTPATIENT
Start: 2017-10-24 | End: 2017-11-28

## 2017-10-24 RX ORDER — OXYCODONE HYDROCHLORIDE 15 MG/1
15 TABLET ORAL
Qty: 70 TABLET | Refills: 0 | Status: SHIPPED | OUTPATIENT
Start: 2017-10-24 | End: 2017-11-07 | Stop reason: DRUGHIGH

## 2017-10-24 NOTE — TELEPHONE ENCOUNTER
Diazepam   10 mg   Last Written Prescription Date:  9/25/17  Last Fill Quantity: 90,   # refills: 0  Future Office visit:    Next 5 appointments (look out 90 days)     Nov 07, 2017 11:20 AM CST   SHORT with Roro Chawla MD   [facility] ([facility]    [address]  [facility] [address]   [phone]               PATIENT ONLY HAS 1 TAB LEFT    Routing refill request to provider for review/approval because:  Drug not on the [facility], [facility] or [facility] refill protocol or controlled substance

## 2017-10-24 NOTE — PROGRESS NOTES
Patient's instructions / PLAN:                                                        Plan:  1. Oxy 15 mg 5 times a day as needed  2. Follow up in 2 weeks          ASSESSMENT & PLAN:                                                      She takes chronic oxycodone 10 mg five times a day.  Since she has acute abdominal pain or dose was increased to 15 mg five times a day.     No issues in [facility] checked - Nov 4    (R10.84) Abdominal pain, generalized  (primary encounter diagnosis)  Comment:   Plan: oxyCODONE (ROXICODONE) 15 MG IR tablet            (F11.20) Continuous opioid dependence (HCC) opioid for chr cough and chest pain ^^^^  Comment:   Plan: oxyCODONE (ROXICODONE) 15 MG IR tablet               Chief Complaint:                                                        Pain meds refill    SUBJECTIVE:                                                    History of present illness     Oldabd  pain resolved. Now pain around the umbilicus Scheduled for HIDA this week.  Still on OXy 15 mg 5 times a day. Couldn't tolerate Dilaudid in ER     Pain is worse with chr coughing      ROS:                                                      ROS: negative for fever, chills, wheezes, chest pain, shortness of breath, vomiting,  leg swelling pos for cough and abd pain     A 10-point review of systems was obtained.  Those pertinent are above and in the in the Subjective section.  The rest of the systems are negative.        OBJECTIVE:                                                    Physical Exam :    Last menstrual period 04/15/2016, not currently breastfeeding.   NAD, appears uncomfortable sec pain      PMHx: reviewed  Past Medical History:   Diagnosis Date     Anxiety state, unspecified      Asthma      Chronic pain     back pain from cyst     Contact dermatitis and other eczema, due to unspecified cause      COPD (chronic obstructive pulmonary disease) (H)      Depressive disorder, not elsewhere classified      Emphysema with  "chronic bronchitis (H)      Esophageal reflux      Family history of ischemic heart disease      Gastro-oesophageal reflux disease      History of emphysema      Hoarseness      HTN, goal below 140/90      Hyperlipidemia LDL goal <130      Liver disease     \"fatty liver\"     Other chronic pain     chest, from coughing     Pneumonia      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)      PONV (postoperative nausea and vomiting)       PSHx: reviewed  Past Surgical History:   Procedure Laterality Date     ARTHROSCOPY SHOULDER DECOMPRESSION Left 10/21/2015    Procedure: ARTHROSCOPY SHOULDER DECOMPRESSION;  Surgeon: Julien Milian MD;  Location: [facility]     BRONCHIAL THERMOPLASTY N/A 11/14/2014    Procedure: BRONCHIAL THERMOPLASTY;  Surgeon: Ward Whitaker MD;  Location: [facility]     BRONCHIAL THERMOPLASTY N/A 12/19/2014    Procedure: BRONCHIAL THERMOPLASTY;  Surgeon: Ward Whitaker MD;  Location: [facility]     BRONCHIAL THERMOPLASTY N/A 2/6/2015    Procedure: BRONCHIAL THERMOPLASTY;  Surgeon: Ward Whitaker MD;  Location: [facility]     C APPENDECTOMY  at age 17     EXCISE NODE MEDIASTINAL  4/26/2013    Procedure: EXCISE NODE MEDIASTINAL;;  Surgeon: Av Peña MD;  Location:  OR     LAPAROSCOPIC CHOLECYSTECTOMY N/A 10/19/2017    Procedure: LAPAROSCOPIC CHOLECYSTECTOMY;  LAPAROSCOPIC CHOLECYSTECTOMY;  Surgeon: Ney Jerry MD;  Location:  OR     THORACOSCOPY  4/26/2013    Procedure: THORACOSCOPY;  LEFT VIDEO ASSISTED THORACOSCOPY, RESECTION OF POSTERIOR MEDIASTINAL MASS;  Surgeon: Av Peña MD;  Location:  OR     TONSILLECTOMY  as a kid     TONSILLECTOMY          Meds: reviewed  Current Outpatient Prescriptions   Medication Sig Dispense Refill     omeprazole (PRILOSEC) 20 MG CR capsule Take 1 capsule (20 mg) by mouth 2 times daily 30 capsule 0     escitalopram (LEXAPRO) 20 MG tablet Take 1 tablet (20 mg) by mouth daily Increased dose. 90 tablet 1     doxepin (SINEQUAN) " 10 MG capsule Take 1 capsule (10 mg) by mouth At Bedtime 30 capsule 1     venlafaxine (EFFEXOR-XR) 37.5 MG 24 hr capsule Take 1 capsule daily for 14 days, then take 2 capsules daily. 46 capsule 0     fluticasone (FLONASE) 50 MCG/ACT spray Spray 2 sprays into both nostrils daily 16 g 11     ranitidine (ZANTAC) 75 MG tablet Take 2 tablets (150 mg) by mouth daily 90 tablet 1     cetirizine HCl 10 MG CAPS Take 1 capsule by mouth daily 90 capsule 3     atorvastatin (LIPITOR) 80 MG tablet Take 1 tablet (80 mg) by mouth daily 90 tablet 1     amLODIPine (NORVASC) 10 MG tablet Take 1 tablet (10 mg) by mouth daily 90 tablet 1     lisinopril (PRINIVIL/ZESTRIL) 20 MG tablet Take 1 tablet (20 mg) by mouth daily 90 tablet 1     montelukast (SINGULAIR) 10 MG tablet Take 1 tablet (10 mg) by mouth At Bedtime 30 tablet 1     furosemide (LASIX) 20 MG tablet Take 1 tablet (20 mg) by mouth daily as needed 90 tablet 1     budesonide-formoterol (SYMBICORT) 160-4.5 MCG/ACT Inhaler Inhale 2 puffs into the lungs 2 times daily 3 Inhaler 3     ipratropium - albuterol 0.5 mg/2.5 mg/3 mL (DUONEB) 0.5-2.5 (3) MG/3ML neb solution Take 1 vial (3 mLs) by nebulization every 6 hours 360 mL 0     tiotropium (SPIRIVA HANDIHALER) 18 MCG capsule Inhale contents of one capsule daily. 30 capsule 1     VITAMIN D, CHOLECALCIFEROL, PO Take 2,000 Units by mouth daily       HYDROmorphone (DILAUDID) 2 MG tablet Take 1-2 tablets (2-4 mg) by mouth every 4 hours as needed for pain maximum 8 tablet(s) per day 16 tablet 0     magnesium hydroxide (MILK OF MAGNESIA) 400 MG/5ML suspension Take 30 mLs by mouth every 4 hours as needed for constipation or heartburn 360 mL 1     oxyCODONE (ROXICODONE) 5 MG IR tablet Take 1 tablet (5 mg) by mouth every 6 hours as needed for pain 35 tablet 0     hydrOXYzine (ATARAX) 50 MG tablet Take 1 tablet (50 mg) by mouth 4 times daily as needed for anxiety Increased dose. For anxiety 120 tablet 0     tiZANidine (ZANAFLEX) 4 MG tablet TK 1  T PO  TID PRF MUSCLE SPASM  0     diazepam (VALIUM) 10 MG tablet Take 1 tablet (10 mg) by mouth every 8 hours as needed for anxiety or sleep For sleep, anxiety, and muscle tension. 90 tablet 0     ondansetron (ZOFRAN) 8 MG tablet Take 0.5-1 tablets (4-8 mg) by mouth every 8 hours as needed for nausea 30 tablet 0     varenicline (CHANTIX STARTING MONTH CHAPITO) 0.5 MG X 11 & 1 MG X 42 tablet Take 0.5 mg tab daily for 3 days, then 0.5 mg tab twice daily for 4 days, then 1 mg twice daily. 53 tablet 0     NICORETTE 2 MG lozenge PLACE 1 LOZENGE INSIDE CHEEK Q HOUR PRF SMOKING CESSATION UTD  1     albuterol (VENTOLIN HFA) 108 (90 BASE) MCG/ACT Inhaler Inhale 2 puffs into the lungs every 6 hours 3 Inhaler 3     desonide (DESOWEN) 0.05 % cream Apply sparingly to affected area three times daily as needed. 60 g 0     prochlorperazine (COMPAZINE) 5 MG tablet Take 1 tablet (5 mg) by mouth every 6 hours as needed for nausea or vomiting 90 tablet 0     order for DME Equipment being ordered: Nebulizer machine  Length of need-lifetime 1 Device 0     chlorhexidine (HIBICLENS) 4 % external liquid Wash ab, groin, thighs daily in shower DO NOT PUT ON FACE 946 mL 3       Soc Hx: reviewed  Fam Hx: reviewed          Roro Low MD  Internal Medicine

## 2017-10-24 NOTE — MR AVS SNAPSHOT
After Visit Summary   10/24/2017    Swetha Patterson    MRN: [id number]           Patient Information     Date Of Birth          1970        Visit Information        Provider Department      10/24/2017 11:00 AM Roro Chawla MD [facility]        Today's Diagnoses     Abdominal pain, generalized    -  1      Care Instructions    Plan:  1. Oxy 15 mg 5 times a day as needed  2. Follow up in 2 weeks          Follow-ups after your visit        Your next 10 appointments already scheduled     Oct 24, 2017 11:00 AM CDT   [facility]    [address]  [facility] [id number] [phone]            Oct 25, 2017 12:40 PM CDT   (Arrive by 12:25 PM)   CT CHEST W/O CONTRAST with UCCT1   [facility] [place] [facility] ([facility])    [address]  1st [address]  [facility] [address] [phone]           Please bring any scans or X-rays taken at other hospitals, if similar tests were done. Also bring a list of your medicines, including vitamins, minerals and over-the-counter drugs. It is safest to leave personal items at home.  Be sure to tell your doctor:   If you have any allergies.   If there s any chance you are pregnant.   If you are breastfeeding.   If you have any special needs.  You do not need to do anything special to prepare.  Please wear loose clothing, such as a sweat suit or jogging clothes. Avoid snaps, zippers and other metal. We may ask you to undress and put on a hospital gown.            Oct 25, 2017  1:00 PM CDT   FULL PULMONARY FUNCTION with  PFL D   [facility] Pulmonary Function Testing ([facility])    [address]  3rd Floor  [facility] [address] [phone]            Oct 25, 2017  2:00 PM CDT   (Arrive by 1:45 PM)   Return Visit with Nita Williamson MD   [facility] ([facility])    [address]  3rd [address]  [facility] [address] [phone]            Oct 26, 2017 10:15 AM CDT   Return Visit with Milena Joseph NP   [facility]    [address]  [address]  [facility] [id number] [phone]            Oct 26, 2017  1:30 PM CDT   NM HEPATOBILIARY SCAN with [id number]   [facility] [place] ([facility])    [address]  [facility] [id number] [phone]           Please bring a list of your medicines to the exam. (Include vitamins, minerals and over-the-counter drugs.) You should wear comfortable clothes. Leave your valuables at home. Please bring related prior results and films.  Tell your doctor:   If you are breastfeeding or may be pregnant.   If you have had a barium test within the past few days. Barium may change the results of certain exams.   If you think you may need sedation (medicine to help you relax).  No food or drink (including water) for 4 hours prior to the exam.  No morphine or morphine derivatives for 6 hours prior to the exam.  Please call your Imaging Department at your exam site with any questions.            Nov 09, 2017  1:00 PM CST   (Arrive by 12:30 PM)   NEW NECK with Agustin Palomares MD   [facility] ([facility])    [address]  4th [facility] [id number] [phone]              Future tests that were ordered for you today     Open Future Orders        Priority Expected Expires Ordered    NM HepatOBiliary Scan Routine 10/23/2017 10/23/2018 10/23/2017            Who to contact     If you have questions or need follow up information about today's clinic visit or your schedule please contact [facility] directly at [phone].  Normal or non-critical lab and imaging results will be  "communicated to you by joizhart, letter or phone within 4 business days after the clinic has received the results. If you do not hear from us within 7 days, please contact the clinic through Inktd or phone. If you have a critical or abnormal lab result, we will notify you by phone as soon as possible.  Submit refill requests through Inktd or call your pharmacy and they will forward the refill request to us. Please allow 3 business days for your refill to be completed.          Additional Information About Your Visit        Inktd Information     Inktd lets you send messages to your doctor, view your test results, renew your prescriptions, schedule appointments and more. To sign up, go to [email or web address] . Click on \"Log in\" on the left side of the screen, which will take you to the Welcome page. Then click on \"Sign up Now\" on the right side of the page.     You will be asked to enter the access code listed below, as well as some personal information. Please follow the directions to create your username and password.     Your access code is: [email or web address]  Expires: 2018  7:42 PM     Your access code will  in 90 days. If you need help or a new code, please call your [place] clinic or [phone].        Care EveryWhere ID     This is your Care EveryWhere ID. This could be used by other organizations to access your [place] medical records  [id number]        Your Vitals Were     Last Period                   04/15/2016            Blood Pressure from Last 3 Encounters:   10/21/17 113/89   10/19/17 136/86   10/16/17 104/68    Weight from Last 3 Encounters:   10/19/17 203 lb (92.1 kg)   10/16/17 201 lb 11.2 oz (91.5 kg)   10/13/17 201 lb (91.2 kg)              Today, you had the following     No orders found for display         Today's Medication Changes          These changes are accurate as of: 10/24/17 10:34 AM.  If you have any questions, ask your nurse or doctor.               These " medicines have changed or have updated prescriptions.        Dose/Directions    * oxyCODONE 5 MG IR tablet   Commonly known as:  ROXICODONE   This may have changed:  Another medication with the same name was added. Make sure you understand how and when to take each.   Used for:  Abdominal pain, epigastric   Changed by:  Roro Chawla MD        Dose:  5 mg   Take 1 tablet (5 mg) by mouth every 6 hours as needed for pain   Quantity:  35 tablet   Refills:  0       * oxyCODONE 15 MG IR tablet   Commonly known as:  ROXICODONE   This may have changed:  You were already taking a medication with the same name, and this prescription was added. Make sure you understand how and when to take each.   Used for:  Abdominal pain, generalized   Changed by:  Roro Chawla MD        Dose:  15 mg   Take 1 tablet (15 mg) by mouth 5 times daily   Quantity:  70 tablet   Refills:  0       * Notice:  This list has 2 medication(s) that are the same as other medications prescribed for you. Read the directions carefully, and ask your doctor or other care provider to review them with you.         Where to get your medicines      Some of these will need a paper prescription and others can be bought over the counter.  Ask your nurse if you have questions.     Bring a paper prescription for each of these medications     oxyCODONE 15 MG IR tablet                Primary Care Provider Office Phone # Fax #    oRro Cahwla [phone]       [address] [facility] [address]        Equal Access to Services     [facility] AH: Hadii skinny clarko Sozbigniew, waaxda luqadaha, qaybta kaalmada adeegyada, caryl cowan . So [facility] [phone].    ATENCIÓN: Si habla español, tiene a vargas disposición servicios gratuitos de asistencia lingüística. Llame al [phone].    We comply with applicable federal civil rights laws and [place] laws. We do not  discriminate on the basis of race, color, national origin, age, disability, sex, sexual orientation, or gender identity.            Thank you!     Thank you for choosing [facility]  for your care. Our goal is always to provide you with excellent care. Hearing back from our patients is one way we can continue to improve our services. Please take a few minutes to complete the written survey that you may receive in the mail after your visit with us. Thank you!             Your Updated Medication List - Protect others around you: Learn how to safely use, store and throw away your medicines at www.disposemymeds.org.          This list is accurate as of: 10/24/17 10:34 AM.  Always use your most recent med list.                   Brand Name Dispense Instructions for use Diagnosis    albuterol 108 (90 BASE) MCG/ACT Inhaler    VENTOLIN HFA    3 Inhaler    Inhale 2 puffs into the lungs every 6 hours    Shortness of breath       amLODIPine 10 MG tablet    NORVASC    90 tablet    Take 1 tablet (10 mg) by mouth daily    HTN, goal below 140/90       atorvastatin 80 MG tablet    LIPITOR    90 tablet    Take 1 tablet (80 mg) by mouth daily    Family history of ischemic heart disease, Hyperlipidemia LDL goal <130       budesonide-formoterol 160-4.5 MCG/ACT Inhaler    SYMBICORT    3 Inhaler    Inhale 2 puffs into the lungs 2 times daily    Uncomplicated asthma, unspecified asthma severity       cetirizine HCl 10 MG Caps     90 capsule    Take 1 capsule by mouth daily    Chronic rhinitis       chlorhexidine 4 % liquid    HIBICLENS    946 mL    Wash ab, groin, thighs daily in shower DO NOT PUT ON FACE    Hidradenitis suppurativa       desonide 0.05 % cream    DESOWEN    60 g    Apply sparingly to affected area three times daily as needed.    Localized pruritus       diazepam 10 MG tablet    VALIUM    90 tablet    Take 1 tablet (10 mg) by mouth every 8 hours as needed for anxiety or sleep For sleep, anxiety, and muscle  tension.    Chest wall pain, Muscle pain, Anxiety       doxepin 10 MG capsule    SINEquan    30 capsule    Take 1 capsule (10 mg) by mouth At Bedtime    Persistent insomnia       escitalopram 20 MG tablet    LEXAPRO    90 tablet    Take 1 tablet (20 mg) by mouth daily Increased dose.    Anxiety       fluticasone 50 MCG/ACT spray    FLONASE    16 g    Spray 2 sprays into both nostrils daily    Severe chronic obstructive pulmonary disease (H)       furosemide 20 MG tablet    LASIX    90 tablet    Take 1 tablet (20 mg) by mouth daily as needed    Bilateral leg edema       HYDROmorphone 2 MG tablet    DILAUDID    16 tablet    Take 1-2 tablets (2-4 mg) by mouth every 4 hours as needed for pain maximum 8 tablet(s) per day        hydrOXYzine 50 MG tablet    ATARAX    120 tablet    Take 1 tablet (50 mg) by mouth 4 times daily as needed for anxiety Increased dose. For anxiety    Anxiety       ipratropium - albuterol 0.5 mg/2.5 mg/3 mL 0.5-2.5 (3) MG/3ML neb solution    DUONEB    360 mL    Take 1 vial (3 mLs) by nebulization every 6 hours    Uncomplicated asthma, unspecified asthma severity       lisinopril 20 MG tablet    PRINIVIL/ZESTRIL    90 tablet    Take 1 tablet (20 mg) by mouth daily    Essential hypertension with goal blood pressure less than 140/90       magnesium hydroxide 400 MG/5ML suspension    MILK OF MAGNESIA    360 mL    Take 30 mLs by mouth every 4 hours as needed for constipation or heartburn        montelukast 10 MG tablet    SINGULAIR    30 tablet    Take 1 tablet (10 mg) by mouth At Bedtime    Dust allergy, Severe chronic obstructive pulmonary disease (H)       NICORETTE 2 MG lozenge   Generic drug:  nicotine polacrilex      PLACE 1 LOZENGE INSIDE CHEEK Q HOUR PRF SMOKING CESSATION UTD        omeprazole 20 MG CR capsule    priLOSEC    30 capsule    Take 1 capsule (20 mg) by mouth 2 times daily    Acute gastritis, presence of bleeding unspecified, unspecified gastritis type       ondansetron 8 MG tablet     ZOFRAN    30 tablet    Take 0.5-1 tablets (4-8 mg) by mouth every 8 hours as needed for nausea    Nausea       order for DME     1 Device    Equipment being ordered: Nebulizer machine Length of need-lifetime    Chronic lung disease, Cough, Severe persistent asthma with acute exacerbation       * oxyCODONE 5 MG IR tablet    ROXICODONE    35 tablet    Take 1 tablet (5 mg) by mouth every 6 hours as needed for pain    Abdominal pain, epigastric       * oxyCODONE 15 MG IR tablet    ROXICODONE    70 tablet    Take 1 tablet (15 mg) by mouth 5 times daily    Abdominal pain, generalized       prochlorperazine 5 MG tablet    COMPAZINE    90 tablet    Take 1 tablet (5 mg) by mouth every 6 hours as needed for nausea or vomiting    Nausea       ranitidine 75 MG tablet    ZANTAC    90 tablet    Take 2 tablets (150 mg) by mouth daily    Uncomplicated asthma, unspecified asthma severity       tiotropium 18 MCG capsule    SPIRIVA HANDIHALER    30 capsule    Inhale contents of one capsule daily.    Other emphysema (H)       tiZANidine 4 MG tablet    ZANAFLEX     TK 1 T PO  TID PRF MUSCLE SPASM        varenicline 0.5 MG X 11 & 1 MG X 42 tablet    CHANTIX STARTING MONTH CHAPITO    53 tablet    Take 0.5 mg tab daily for 3 days, then 0.5 mg tab twice daily for 4 days, then 1 mg twice daily.    Encounter for smoking cessation counseling       venlafaxine 37.5 MG 24 hr capsule    EFFEXOR-XR    46 capsule    Take 1 capsule daily for 14 days, then take 2 capsules daily.    Menopausal syndrome (hot flashes)       VITAMIN D (CHOLECALCIFEROL) PO      Take 2,000 Units by mouth daily        * Notice:  This list has 2 medication(s) that are the same as other medications prescribed for you. Read the directions carefully, and ask your doctor or other care provider to review them with you.

## 2017-11-01 NOTE — TELEPHONE ENCOUNTER
APPT INFO    Date /Time: 11/9/17- 1:00 PM    Reason for Appt: Foraminal stenosis of cervical region    Ref Provider/Clinic: Self    Are there internal records? If yes, list: -[facility] [place] [facility] - Dr. Hill   -[facility]  -X-rays and MRI in PACS      Patient Contact (Y/N) & Call Details: No - pt is referred. All records/imaging in Epic/PACS.    Action: Closing encounter.

## 2017-11-07 ENCOUNTER — OFFICE VISIT (OUTPATIENT)
Dept: INTERNAL MEDICINE | Facility: CLINIC | Age: 47
End: 2017-11-07
Payer: MEDICARE

## 2017-11-07 DIAGNOSIS — R07.89 CHEST WALL PAIN: ICD-10-CM

## 2017-11-07 DIAGNOSIS — J44.9 SEVERE CHRONIC OBSTRUCTIVE PULMONARY DISEASE (H): Chronic | ICD-10-CM

## 2017-11-07 DIAGNOSIS — F11.20 CONTINUOUS OPIOID DEPENDENCE (H): Primary | Chronic | ICD-10-CM

## 2017-11-07 DIAGNOSIS — R10.13 ABDOMINAL PAIN, EPIGASTRIC: ICD-10-CM

## 2017-11-07 PROCEDURE — 99213 OFFICE O/P EST LOW 20 MIN: CPT | Performed by: INTERNAL MEDICINE

## 2017-11-07 RX ORDER — OXYCODONE HYDROCHLORIDE 10 MG/1
TABLET ORAL
Qty: 70 TABLET | Refills: 0 | Status: SHIPPED | OUTPATIENT
Start: 2017-11-07 | End: 2017-11-21

## 2017-11-07 NOTE — PROGRESS NOTES
"Dr Low's note            ASSESSMENT & PLAN:                                                      (F11.20) Continuous opioid dependence (HCC) opioid for chr cough and chest pain ^^^^  (primary encounter diagnosis)  (R07.89) Chest wall pain  (J44.9) Chr Lung Disease - managed by the pulm dept ([facility])^^^  Comment:   Plan: oxyCODONE IR (ROXICODONE) 10 MG tablet            (R10.13) Abdominal pain, epigastric  Comment: still present   Plan: f/u with the surgeon      Chief complaint:                                                      Pain management    SUBJECTIVE:   Swetha Patterson is a 47 year old female who presents to clinic today for the following health issues:      *RECHECK-Follow up LOV 10/24/17. Still unable to keep any food or liquids down, will be meeting with a surgeon tomorrow to discuss hiatal hernia.   Would like to go down to the 10mg tabs of oxycodone. She doesn't feel like she needs pills this strong anymore.       Review of Systems:                                                      ROS: negative for fever, chills, cough, wheezes, chest pain, shortness of breath, vomiting, abdominal pain, leg swelling     A 10-point review of systems was obtained.  Those pertinent are above and in the in the Subjective section.  The rest of the systems are negative.           OBJECTIVE:             Physical exam:   Blood pressure 130/88, pulse 84, temperature 98.5  F (36.9  C), temperature source Oral, height 5' 4\" (1.626 m), weight 202 lb 6.4 oz (91.8 kg), last menstrual period 04/15/2016, SpO2 97 %, not currently breastfeeding.     NAD, appears uncomfortablebecause of the pain,but not in distress as she was before surgery       PMHx: reviewed  Past Medical History:   Diagnosis Date     Anxiety state, unspecified      Asthma      Chronic pain     back pain from cyst     Contact dermatitis and other eczema, due to unspecified cause      COPD (chronic obstructive pulmonary disease) (H)      Depressive disorder, not " "elsewhere classified      Emphysema with chronic bronchitis (H)      Esophageal reflux      Family history of ischemic heart disease      Gastro-oesophageal reflux disease      History of emphysema      Hoarseness      HTN, goal below 140/90      Hyperlipidemia LDL goal <130      Liver disease     \"fatty liver\"     Other chronic pain     chest, from coughing     Pneumonia      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)      PONV (postoperative nausea and vomiting)       PSHx: reviewed  Past Surgical History:   Procedure Laterality Date     ARTHROSCOPY SHOULDER DECOMPRESSION Left 10/21/2015    Procedure: ARTHROSCOPY SHOULDER DECOMPRESSION;  Surgeon: Julien Milian MD;  Location: [facility] OR     BRONCHIAL THERMOPLASTY N/A 11/14/2014    Procedure: BRONCHIAL THERMOPLASTY;  Surgeon: Ward Whitaker MD;  Location: [facility]     BRONCHIAL THERMOPLASTY N/A 12/19/2014    Procedure: BRONCHIAL THERMOPLASTY;  Surgeon: Ward Whitaker MD;  Location: [facility] OR     BRONCHIAL THERMOPLASTY N/A 2/6/2015    Procedure: BRONCHIAL THERMOPLASTY;  Surgeon: Ward Whitaker MD;  Location: [facility]     C APPENDECTOMY  at age 17     EXCISE NODE MEDIASTINAL  4/26/2013    Procedure: EXCISE NODE MEDIASTINAL;;  Surgeon: Av Peña MD;  Location:  OR     LAPAROSCOPIC CHOLECYSTECTOMY N/A 10/19/2017    Procedure: LAPAROSCOPIC CHOLECYSTECTOMY;  LAPAROSCOPIC CHOLECYSTECTOMY;  Surgeon: Ney Jerry MD;  Location:  OR     THORACOSCOPY  4/26/2013    Procedure: THORACOSCOPY;  LEFT VIDEO ASSISTED THORACOSCOPY, RESECTION OF POSTERIOR MEDIASTINAL MASS;  Surgeon: Av Peña MD;  Location:  OR     TONSILLECTOMY  as a kid     TONSILLECTOMY          Meds: reviewed  Current Outpatient Prescriptions   Medication Sig Dispense Refill     oxyCODONE IR (ROXICODONE) 10 MG tablet 5 times daily as needed 70 tablet 0     diazepam (VALIUM) 10 MG tablet Take 1 tablet (10 mg) by mouth every 8 hours as needed for anxiety or sleep " For sleep, anxiety, and muscle tension. 90 tablet 0     magnesium hydroxide (MILK OF MAGNESIA) 400 MG/5ML suspension Take 30 mLs by mouth every 4 hours as needed for constipation or heartburn 360 mL 1     hydrOXYzine (ATARAX) 50 MG tablet Take 1 tablet (50 mg) by mouth 4 times daily as needed for anxiety Increased dose. For anxiety 120 tablet 0     tiZANidine (ZANAFLEX) 4 MG tablet TK 1 T PO  TID PRF MUSCLE SPASM  0     ondansetron (ZOFRAN) 8 MG tablet Take 0.5-1 tablets (4-8 mg) by mouth every 8 hours as needed for nausea 30 tablet 0     omeprazole (PRILOSEC) 20 MG CR capsule Take 1 capsule (20 mg) by mouth 2 times daily 30 capsule 0     varenicline (CHANTIX STARTING MONTH PAK) 0.5 MG X 11 & 1 MG X 42 tablet Take 0.5 mg tab daily for 3 days, then 0.5 mg tab twice daily for 4 days, then 1 mg twice daily. 53 tablet 0     escitalopram (LEXAPRO) 20 MG tablet Take 1 tablet (20 mg) by mouth daily Increased dose. 90 tablet 1     doxepin (SINEQUAN) 10 MG capsule Take 1 capsule (10 mg) by mouth At Bedtime 30 capsule 1     venlafaxine (EFFEXOR-XR) 37.5 MG 24 hr capsule Take 1 capsule daily for 14 days, then take 2 capsules daily. 46 capsule 0     fluticasone (FLONASE) 50 MCG/ACT spray Spray 2 sprays into both nostrils daily 16 g 11     ranitidine (ZANTAC) 75 MG tablet Take 2 tablets (150 mg) by mouth daily 90 tablet 1     cetirizine HCl 10 MG CAPS Take 1 capsule by mouth daily 90 capsule 3     atorvastatin (LIPITOR) 80 MG tablet Take 1 tablet (80 mg) by mouth daily 90 tablet 1     amLODIPine (NORVASC) 10 MG tablet Take 1 tablet (10 mg) by mouth daily 90 tablet 1     lisinopril (PRINIVIL/ZESTRIL) 20 MG tablet Take 1 tablet (20 mg) by mouth daily 90 tablet 1     montelukast (SINGULAIR) 10 MG tablet Take 1 tablet (10 mg) by mouth At Bedtime 30 tablet 1     furosemide (LASIX) 20 MG tablet Take 1 tablet (20 mg) by mouth daily as needed 90 tablet 1     NICORETTE 2 MG lozenge PLACE 1 LOZENGE INSIDE CHEEK Q HOUR PRF SMOKING  CESSATION UTD  1     budesonide-formoterol (SYMBICORT) 160-4.5 MCG/ACT Inhaler Inhale 2 puffs into the lungs 2 times daily 3 Inhaler 3     ipratropium - albuterol 0.5 mg/2.5 mg/3 mL (DUONEB) 0.5-2.5 (3) MG/3ML neb solution Take 1 vial (3 mLs) by nebulization every 6 hours 360 mL 0     tiotropium (SPIRIVA HANDIHALER) 18 MCG capsule Inhale contents of one capsule daily. 30 capsule 1     albuterol (VENTOLIN HFA) 108 (90 BASE) MCG/ACT Inhaler Inhale 2 puffs into the lungs every 6 hours 3 Inhaler 3     desonide (DESOWEN) 0.05 % cream Apply sparingly to affected area three times daily as needed. 60 g 0     prochlorperazine (COMPAZINE) 5 MG tablet Take 1 tablet (5 mg) by mouth every 6 hours as needed for nausea or vomiting 90 tablet 0     order for DME Equipment being ordered: Nebulizer machine  Length of need-lifetime 1 Device 0     chlorhexidine (HIBICLENS) 4 % external liquid Wash ab, groin, thighs daily in shower DO NOT PUT ON FACE 946 mL 3     VITAMIN D, CHOLECALCIFEROL, PO Take 2,000 Units by mouth daily         Soc Hx: reviewed  Fam Hx: reviewed          Roro Low MD  Internal Medicine

## 2017-11-07 NOTE — NURSING NOTE
"Chief Complaint   Patient presents with     RECHECK       Initial BP (!) 130/92 (BP Location: Right arm, Patient Position: Chair, Cuff Size: Adult Large)  Pulse 84  Temp 98.5  F (36.9  C) (Oral)  Ht 5' 4\" (1.626 m)  Wt 202 lb 6.4 oz (91.8 kg)  LMP 04/15/2016  SpO2 97%  Breastfeeding? No  BMI 34.74 kg/m2 Estimated body mass index is 34.74 kg/(m^2) as calculated from the following:    Height as of this encounter: 5' 4\" (1.626 m).    Weight as of this encounter: 202 lb 6.4 oz (91.8 kg).  Medication Reconciliation: complete   Mammogram discussed multiple times, she would like to wait on this until she has addressed more of her other immediate concerns.   Carolyn Villarreal CMA      "

## 2017-11-07 NOTE — MR AVS SNAPSHOT
After Visit Summary   11/7/2017    Swetha Patterson    MRN: [id number]           Patient Information     Date Of Birth          1970        Visit Information        Provider Department      11/7/2017 11:20 AM Roro Chawla MD [facility]        Today's Diagnoses     Continuous opioid dependence (HCC) opioid for chr cough and chest pain ^^^^    -  1    Chest wall pain        Chr Lung Disease - managed by the pulm dept (HCC)^^^        Abdominal pain, epigastric           Follow-ups after your visit        Your next 10 appointments already scheduled     Nov 21, 2017  9:20 AM CST   SHORT with Roro Chawla MD   [facility] ([facility])    [address]  [facility] [id number] [phone]            Dec 20, 2017  3:00 PM CST   (Arrive by 2:45 PM)   CT CHEST W/O CONTRAST with CT2   [facility] [place] [facility] ([facility])    [address] [place] [address] [phone]           Please bring any scans or X-rays taken at other hospitals, if similar tests were done. Also bring a list of your medicines, including vitamins, minerals and over-the-counter drugs. It is safest to leave personal items at home.  Be sure to tell your doctor:   If you have any allergies.   If there s any chance you are pregnant.   If you are breastfeeding.   If you have any special needs.  You do not need to do anything special to prepare.  Please wear loose clothing, such as a sweat suit or jogging clothes. Avoid snaps, zippers and other metal. We may ask you to undress and put on a hospital gown.            Dec 20, 2017  3:30 PM CST   FULL PULMONARY FUNCTION with  PFL C   [facility] Pulmonary Function Testing ([facility])    [address] [address] [phone]            Dec 20, 2017  4:30 PM CST   (Arrive by 4:15 PM)  "  Return Visit with Nita Williamson MD   [facility] ([facility])    [address] [facility]  3rd [facility] [address] [phone]              Who to contact     If you have questions or need follow up information about today's clinic visit or your schedule please contact [facility] directly at [phone].  Normal or non-critical lab and imaging results will be communicated to you by MyChart, letter or phone within 4 business days after the clinic has received the results. If you do not hear from us within 7 days, please contact the clinic through [employer] Therapeuticshart or phone. If you have a critical or abnormal lab result, we will notify you by phone as soon as possible.  Submit refill requests through Breezy or call your pharmacy and they will forward the refill request to us. Please allow 3 business days for your refill to be completed.          Additional Information About Your Visit        MyCharBeestar Information     Breezy lets you send messages to your doctor, view your test results, renew your prescriptions, schedule appointments and more. To sign up, go to [email or web address] . Click on \"Log in\" on the left side of the screen, which will take you to the Welcome page. Then click on \"Sign up Now\" on the right side of the page.     You will be asked to enter the access code listed below, as well as some personal information. Please follow the directions to create your username and password.     Your access code is: [email or web address]  Expires: 2018  6:42 PM     Your access code will  in 90 days. If you need help or a new code, please call your [place] clinic or [phone].        Care EveryWhere ID     This is your Care EveryWhere ID. This could be used by other organizations to access your [place] medical records  [id number]        Your Vitals Were     Pulse Temperature Height Last Period Pulse Oximetry Breastfeeding?    84 " "98.5  F (36.9  C) (Oral) 5' 4\" (1.626 m) 04/15/2016 97% No    BMI (Body Mass Index)                   34.74 kg/m2            Blood Pressure from Last 3 Encounters:   11/08/17 134/80   11/07/17 130/88   10/24/17 110/64    Weight from Last 3 Encounters:   11/09/17 203 lb (92.1 kg)   11/08/17 202 lb (91.6 kg)   11/07/17 202 lb 6.4 oz (91.8 kg)              Today, you had the following     No orders found for display         Today's Medication Changes          These changes are accurate as of: 11/7/17 11:59 PM.  If you have any questions, ask your nurse or doctor.               These medicines have changed or have updated prescriptions.        Dose/Directions    oxyCODONE IR 10 MG tablet   Commonly known as:  ROXICODONE   This may have changed:    - medication strength  - how much to take  - how to take this  - when to take this  - reasons to take this  - additional instructions  - Another medication with the same name was removed. Continue taking this medication, and follow the directions you see here.   Used for:  Chest wall pain, Severe chronic obstructive pulmonary disease (H), Continuous opioid dependence (H)   Changed by:  Roro Chawla MD        5 times daily as needed   Quantity:  70 tablet   Refills:  0            Where to get your medicines      Some of these will need a paper prescription and others can be bought over the counter.  Ask your nurse if you have questions.     Bring a paper prescription for each of these medications     oxyCODONE IR 10 MG tablet                Primary Care Provider Office Phone # Fax #    Roro Chawla [phone]       [address] [facility] [address]        Equal Access to Services     [facility] AH: Hadii skinny Jin, waneliada luqadaha, qaybta kaalmada adeegyada, caryl saunders. So [facility] [phone].    ATENCIÓN: Si habla español, tiene a vargas disposición servicios gratuitos de " asistencia lingüística. Briseida al [phone].    We comply with applicable federal civil rights laws and [place] laws. We do not discriminate on the basis of race, color, national origin, age, disability, sex, sexual orientation, or gender identity.            Thank you!     Thank you for choosing [facility]  for your care. Our goal is always to provide you with excellent care. Hearing back from our patients is one way we can continue to improve our services. Please take a few minutes to complete the written survey that you may receive in the mail after your visit with us. Thank you!             Your Updated Medication List - Protect others around you: Learn how to safely use, store and throw away your medicines at www.disposemymeds.org.          This list is accurate as of: 11/7/17 11:59 PM.  Always use your most recent med list.                   Brand Name Dispense Instructions for use Diagnosis    albuterol 108 (90 BASE) MCG/ACT Inhaler    VENTOLIN HFA    3 Inhaler    Inhale 2 puffs into the lungs every 6 hours    Shortness of breath       amLODIPine 10 MG tablet    NORVASC    90 tablet    Take 1 tablet (10 mg) by mouth daily    HTN, goal below 140/90       atorvastatin 80 MG tablet    LIPITOR    90 tablet    Take 1 tablet (80 mg) by mouth daily    Family history of ischemic heart disease, Hyperlipidemia LDL goal <130       budesonide-formoterol 160-4.5 MCG/ACT Inhaler    SYMBICORT    3 Inhaler    Inhale 2 puffs into the lungs 2 times daily    Uncomplicated asthma, unspecified asthma severity       cetirizine HCl 10 MG Caps     90 capsule    Take 1 capsule by mouth daily    Chronic rhinitis       chlorhexidine 4 % liquid    HIBICLENS    946 mL    Wash ab, groin, thighs daily in shower DO NOT PUT ON FACE    Hidradenitis suppurativa       desonide 0.05 % cream    DESOWEN    60 g    Apply sparingly to affected area three times daily as needed.    Localized pruritus       diazepam 10 MG tablet     VALIUM    90 tablet    Take 1 tablet (10 mg) by mouth every 8 hours as needed for anxiety or sleep For sleep, anxiety, and muscle tension.    Chest wall pain, Muscle pain, Anxiety       doxepin 10 MG capsule    SINEquan    30 capsule    Take 1 capsule (10 mg) by mouth At Bedtime    Persistent insomnia       escitalopram 20 MG tablet    LEXAPRO    90 tablet    Take 1 tablet (20 mg) by mouth daily Increased dose.    Anxiety       fluticasone 50 MCG/ACT spray    FLONASE    16 g    Spray 2 sprays into both nostrils daily    Severe chronic obstructive pulmonary disease (H)       furosemide 20 MG tablet    LASIX    90 tablet    Take 1 tablet (20 mg) by mouth daily as needed    Bilateral leg edema       hydrOXYzine 50 MG tablet    ATARAX    120 tablet    Take 1 tablet (50 mg) by mouth 4 times daily as needed for anxiety Increased dose. For anxiety    Anxiety       ipratropium - albuterol 0.5 mg/2.5 mg/3 mL 0.5-2.5 (3) MG/3ML neb solution    DUONEB    360 mL    Take 1 vial (3 mLs) by nebulization every 6 hours    Uncomplicated asthma, unspecified asthma severity       lisinopril 20 MG tablet    PRINIVIL/ZESTRIL    90 tablet    Take 1 tablet (20 mg) by mouth daily    Essential hypertension with goal blood pressure less than 140/90       magnesium hydroxide 400 MG/5ML suspension    MILK OF MAGNESIA    360 mL    Take 30 mLs by mouth every 4 hours as needed for constipation or heartburn        montelukast 10 MG tablet    SINGULAIR    30 tablet    Take 1 tablet (10 mg) by mouth At Bedtime    Dust allergy, Severe chronic obstructive pulmonary disease (H)       NICORETTE 2 MG lozenge   Generic drug:  nicotine polacrilex      PLACE 1 LOZENGE INSIDE CHEEK Q HOUR PRF SMOKING CESSATION UTD        omeprazole 20 MG CR capsule    priLOSEC    30 capsule    Take 1 capsule (20 mg) by mouth 2 times daily    Acute gastritis, presence of bleeding unspecified, unspecified gastritis type       ondansetron 8 MG tablet    ZOFRAN    30 tablet     Take 0.5-1 tablets (4-8 mg) by mouth every 8 hours as needed for nausea    Nausea       order for DME     1 Device    Equipment being ordered: Nebulizer machine Length of need-lifetime    Chronic lung disease, Cough, Severe persistent asthma with acute exacerbation       oxyCODONE IR 10 MG tablet    ROXICODONE    70 tablet    5 times daily as needed    Chest wall pain, Severe chronic obstructive pulmonary disease (H), Continuous opioid dependence (H)       prochlorperazine 5 MG tablet    COMPAZINE    90 tablet    Take 1 tablet (5 mg) by mouth every 6 hours as needed for nausea or vomiting    Nausea       ranitidine 75 MG tablet    ZANTAC    90 tablet    Take 2 tablets (150 mg) by mouth daily    Uncomplicated asthma, unspecified asthma severity       tiotropium 18 MCG capsule    SPIRIVA HANDIHALER    30 capsule    Inhale contents of one capsule daily.    Other emphysema (H)       tiZANidine 4 MG tablet    ZANAFLEX     TK 1 T PO  TID PRF MUSCLE SPASM        varenicline 0.5 MG X 11 & 1 MG X 42 tablet    CHANTIX STARTING MONTH CHAPITO    53 tablet    Take 0.5 mg tab daily for 3 days, then 0.5 mg tab twice daily for 4 days, then 1 mg twice daily.    Encounter for smoking cessation counseling       venlafaxine 37.5 MG 24 hr capsule    EFFEXOR-XR    46 capsule    Take 1 capsule daily for 14 days, then take 2 capsules daily.    Menopausal syndrome (hot flashes)       VITAMIN D (CHOLECALCIFEROL) PO      Take 2,000 Units by mouth daily

## 2017-11-08 ENCOUNTER — OFFICE VISIT (OUTPATIENT)
Dept: SURGERY | Facility: CLINIC | Age: 47
End: 2017-11-08
Payer: MEDICARE

## 2017-11-08 VITALS
WEIGHT: 202 LBS | HEIGHT: 64 IN | DIASTOLIC BLOOD PRESSURE: 80 MMHG | SYSTOLIC BLOOD PRESSURE: 134 MMHG | HEART RATE: 68 BPM | BODY MASS INDEX: 34.49 KG/M2

## 2017-11-08 DIAGNOSIS — R13.19 ESOPHAGEAL DYSPHAGIA: Primary | ICD-10-CM

## 2017-11-08 PROCEDURE — 99214 OFFICE O/P EST MOD 30 MIN: CPT | Performed by: SURGERY

## 2017-11-08 NOTE — MR AVS SNAPSHOT
After Visit Summary   11/8/2017    Swetha Patterson    MRN: [id number]           Patient Information     Date Of Birth          1970        Visit Information        Provider Department      11/8/2017 1:15 PM Mayank Blanco MD [facility] Hernia       Follow-ups after your visit        Your next 10 appointments already scheduled     Nov 09, 2017  1:00 PM CST   (Arrive by 12:30 PM)   NEW NECK with Agustin Palomares MD   [facility] ([facility])    [address] [facility]  4th Floor  [facility] [id number] [phone]            Nov 21, 2017  9:20 AM CST   SHORT with Roro Chawla MD   [facility] ([facility])    [address]  [facility] [id number] [phone]            Dec 20, 2017  3:00 PM CST   (Arrive by 2:45 PM)   CT CHEST W/O CONTRAST with UCCT2   [facility] [place] [facility] ([facility])    [address] [place] [address]  1st [facility] [place] [id number] [phone]           Please bring any scans or X-rays taken at other hospitals, if similar tests were done. Also bring a list of your medicines, including vitamins, minerals and over-the-counter drugs. It is safest to leave personal items at home.  Be sure to tell your doctor:   If you have any allergies.   If there s any chance you are pregnant.   If you are breastfeeding.   If you have any special needs.  You do not need to do anything special to prepare.  Please wear loose clothing, such as a sweat suit or jogging clothes. Avoid snaps, zippers and other metal. We may ask you to undress and put on a hospital gown.            Dec 20, 2017  3:30 PM CST   FULL PULMONARY FUNCTION with  PFL C   [facility] Pul[facility] Function Testing ([facility])    [address] [place] [address]  3rd [facility] [place] [id number] [phone]             "Dec 20, 2017  4:30 PM CST   (Arrive by 4:15 PM)   Return Visit with Nita Williamson MD   [facility] ([facility])    [address] [address] [phone]              Who to contact     If you have questions or need follow up information about today's clinic visit or your schedule please contact [facility] directly at [phone].  Normal or non-critical lab and imaging results will be communicated to you by Wanderiohart, letter or phone within 4 business days after the clinic has received the results. If you do not hear from us within 7 days, please contact the clinic through Wander[employer]hart or phone. If you have a critical or abnormal lab result, we will notify you by phone as soon as possible.  Submit refill requests through RobotsAlive or call your pharmacy and they will forward the refill request to us. Please allow 3 business days for your refill to be completed.          Additional Information About Your Visit        Wanderio[employer]omera [employer] Information     RobotsAlive lets you send messages to your doctor, view your test results, renew your prescriptions, schedule appointments and more. To sign up, go to [email or web address] . Click on \"Log in\" on the left side of the screen, which will take you to the Welcome page. Then click on \"Sign up Now\" on the right side of the page.     You will be asked to enter the access code listed below, as well as some personal information. Please follow the directions to create your username and password.     Your access code is: [email or web address]  Expires: 2018  6:42 PM     Your access code will  in 90 days. If you need help or a new code, please call your [place] clinic or [phone].        Care EveryWhere ID     This is your Care EveryWhere ID. This could be used by other organizations to access your [place] medical records  [id number]        Your Vitals Were     Pulse Height Last Period BMI " "(Body Mass Index)          68 5' 4\" (1.626 m) 04/15/2016 34.67 kg/m2         Blood Pressure from Last 3 Encounters:   11/08/17 134/80   11/07/17 (!) 130/92   10/24/17 110/64    Weight from Last 3 Encounters:   11/08/17 202 lb (91.6 kg)   11/07/17 202 lb 6.4 oz (91.8 kg)   10/19/17 203 lb (92.1 kg)              Today, you had the following     No orders found for display       Primary Care Provider Office Phone # Fax #    Roro Chawla [phone]       [address]  [facility] [address]        Equal Access to Services     [facility] DELBERT : Ramon clarko Sozbigniew, waaxda luqadaha, qaybta kaalmada adeegyada, caryl cowan . So [facility] [phone].    ATENCIÓN: Si habla español, tiene a vargas disposición servicios gratuitos de asistencia lingüística. [facility] [phone].    We comply with applicable federal civil rights laws and [place] laws. We do not discriminate on the basis of race, color, national origin, age, disability, sex, sexual orientation, or gender identity.            Thank you!     Thank you for choosing [facility]  for your care. Our goal is always to provide you with excellent care. Hearing back from our patients is one way we can continue to improve our services. Please take a few minutes to complete the written survey that you may receive in the mail after your visit with us. Thank you!             Your Updated Medication List - Protect others around you: Learn how to safely use, store and throw away your medicines at www.disposemymeds.org.          This list is accurate as of: 11/8/17  2:14 PM.  Always use your most recent med list.                   Brand Name Dispense Instructions for use Diagnosis    albuterol 108 (90 BASE) MCG/ACT Inhaler    VENTOLIN HFA    3 Inhaler    Inhale 2 puffs into the lungs every 6 hours    Shortness of breath       amLODIPine 10 MG tablet    NORVASC    90 tablet    Take 1 tablet (10 " mg) by mouth daily    HTN, goal below 140/90       atorvastatin 80 MG tablet    LIPITOR    90 tablet    Take 1 tablet (80 mg) by mouth daily    Family history of ischemic heart disease, Hyperlipidemia LDL goal <130       budesonide-formoterol 160-4.5 MCG/ACT Inhaler    SYMBICORT    3 Inhaler    Inhale 2 puffs into the lungs 2 times daily    Uncomplicated asthma, unspecified asthma severity       cetirizine HCl 10 MG Caps     90 capsule    Take 1 capsule by mouth daily    Chronic rhinitis       chlorhexidine 4 % liquid    HIBICLENS    946 mL    Wash ab, groin, thighs daily in shower DO NOT PUT ON FACE    Hidradenitis suppurativa       desonide 0.05 % cream    DESOWEN    60 g    Apply sparingly to affected area three times daily as needed.    Localized pruritus       diazepam 10 MG tablet    VALIUM    90 tablet    Take 1 tablet (10 mg) by mouth every 8 hours as needed for anxiety or sleep For sleep, anxiety, and muscle tension.    Chest wall pain, Muscle pain, Anxiety       doxepin 10 MG capsule    SINEquan    30 capsule    Take 1 capsule (10 mg) by mouth At Bedtime    Persistent insomnia       escitalopram 20 MG tablet    LEXAPRO    90 tablet    Take 1 tablet (20 mg) by mouth daily Increased dose.    Anxiety       fluticasone 50 MCG/ACT spray    FLONASE    16 g    Spray 2 sprays into both nostrils daily    Severe chronic obstructive pulmonary disease (H)       furosemide 20 MG tablet    LASIX    90 tablet    Take 1 tablet (20 mg) by mouth daily as needed    Bilateral leg edema       hydrOXYzine 50 MG tablet    ATARAX    120 tablet    Take 1 tablet (50 mg) by mouth 4 times daily as needed for anxiety Increased dose. For anxiety    Anxiety       ipratropium - albuterol 0.5 mg/2.5 mg/3 mL 0.5-2.5 (3) MG/3ML neb solution    DUONEB    360 mL    Take 1 vial (3 mLs) by nebulization every 6 hours    Uncomplicated asthma, unspecified asthma severity       lisinopril 20 MG tablet    PRINIVIL/ZESTRIL    90 tablet    Take 1  tablet (20 mg) by mouth daily    Essential hypertension with goal blood pressure less than 140/90       magnesium hydroxide 400 MG/5ML suspension    MILK OF MAGNESIA    360 mL    Take 30 mLs by mouth every 4 hours as needed for constipation or heartburn        montelukast 10 MG tablet    SINGULAIR    30 tablet    Take 1 tablet (10 mg) by mouth At Bedtime    Dust allergy, Severe chronic obstructive pulmonary disease (H)       NICORETTE 2 MG lozenge   Generic drug:  nicotine polacrilex      PLACE 1 LOZENGE INSIDE CHEEK Q HOUR PRF SMOKING CESSATION UTD        omeprazole 20 MG CR capsule    priLOSEC    30 capsule    Take 1 capsule (20 mg) by mouth 2 times daily    Acute gastritis, presence of bleeding unspecified, unspecified gastritis type       ondansetron 8 MG tablet    ZOFRAN    30 tablet    Take 0.5-1 tablets (4-8 mg) by mouth every 8 hours as needed for nausea    Nausea       order for DME     1 Device    Equipment being ordered: Nebulizer machine Length of need-lifetime    Chronic lung disease, Cough, Severe persistent asthma with acute exacerbation       oxyCODONE IR 10 MG tablet    ROXICODONE    70 tablet    5 times daily as needed    Chest wall pain, Severe chronic obstructive pulmonary disease (H), Continuous opioid dependence (H)       prochlorperazine 5 MG tablet    COMPAZINE    90 tablet    Take 1 tablet (5 mg) by mouth every 6 hours as needed for nausea or vomiting    Nausea       ranitidine 75 MG tablet    ZANTAC    90 tablet    Take 2 tablets (150 mg) by mouth daily    Uncomplicated asthma, unspecified asthma severity       tiotropium 18 MCG capsule    SPIRIVA HANDIHALER    30 capsule    Inhale contents of one capsule daily.    Other emphysema (H)       tiZANidine 4 MG tablet    ZANAFLEX     TK 1 T PO  TID PRF MUSCLE SPASM        varenicline 0.5 MG X 11 & 1 MG X 42 tablet    CHANTIX STARTING MONTH PAK 53 tablet    Take 0.5 mg tab daily for 3 days, then 0.5 mg tab twice daily for 4 days, then 1 mg  twice daily.    Encounter for smoking cessation counseling       venlafaxine 37.5 MG 24 hr capsule    EFFEXOR-XR    46 capsule    Take 1 capsule daily for 14 days, then take 2 capsules daily.    Menopausal syndrome (hot flashes)       VITAMIN D (CHOLECALCIFEROL) PO      Take 2,000 Units by mouth daily

## 2017-11-08 NOTE — LETTER
"2017    Re: Swetha Patterson - 1970    HISTORY OF PRESENT ILLNESS:  Swetha Patterson is a 47 year old female who is seen in consultation at the request of Dr. Jerry for evaluation of difficulty swallowing.  She has been having abdominal pain for long time for what she takes chronic narcotics.  Her complaint is mainly upper abdominal pain.  Recently she had a lap jocelyn, her symptoms did not improved.  On EGD there is small sliding hiatal hernia.  CT scan reviewed, the EG junction appears in the abdominal cavity, as is the entire stomach.     REVIEW OF SYSTEMS:  Constitutional:  Negative for chills, fatigue, fever and weight change.  Eyes:  Negative for new vision problems.  ENT:  Negative for ENT pain.  Cardiovascular:  Negative for chest pain, palpitations.  Respiratory:  Negative for cough, dyspnea.  Gastrointestinal:  See HPI  Musculoskeletal:  Negative for new arthralgias or myalgias.     Vitals: /80  Pulse 68  Ht 5' 4\" (1.626 m)  Wt 202 lb (91.6 kg)  LMP 04/15/2016  BMI 34.67 kg/m2  BMI= Body mass index is 34.67 kg/(m^2).     EXAM:  GENERAL: healthy, alert and anxious    HEENT: moist mucus membranes, no scleral icterus  CARDIOVASCULAR:  RRR, No JVD  RESPIRATORY: non labored breathing  NECK: Neck supple. No noticeable masses.  ABDOMEN: soft, nontender, nondistended  Extremities: warm and well perfused, no edema  SKIN: No suspicious lesions or rashes      LABS/Imaging: reviewed endoscopy and imaging studies     ASSESSMENT:  Swetha Patterson suffers from   1. Difficulty swallowing  2. Chronic abdominal pain  3. opioid addiction  4. nicottine addiciton     - GASTROENTEROLOGY ADULT REF CONSULT ONLY        PLAN:     Given the symptoms, studies findings, and history it does not appear her complaints are rooted on this small hiatal hernia.  Maybe she would benefit from further motility/GI evaluation, addictions management for opioids and nicotine, and weight loss.        Thank you for " this consultation.      If you have any questions please give me a call.     Best regards,  Mayank Blanco MD

## 2017-11-09 ENCOUNTER — OFFICE VISIT (OUTPATIENT)
Dept: ORTHOPEDICS | Facility: CLINIC | Age: 47
End: 2017-11-09

## 2017-11-09 ENCOUNTER — PRE VISIT (OUTPATIENT)
Dept: ORTHOPEDICS | Facility: CLINIC | Age: 47
End: 2017-11-09

## 2017-11-09 VITALS — HEIGHT: 64 IN | BODY MASS INDEX: 34.66 KG/M2 | WEIGHT: 203 LBS

## 2017-11-09 DIAGNOSIS — F11.20 OPIOID TYPE DEPENDENCE, CONTINUOUS (H): ICD-10-CM

## 2017-11-09 DIAGNOSIS — M50.20 DISPLACEMENT OF CERVICAL INTERVERTEBRAL DISC WITHOUT MYELOPATHY: Primary | ICD-10-CM

## 2017-11-09 ASSESSMENT — ENCOUNTER SYMPTOMS
SPUTUM PRODUCTION: 0
NAUSEA: 1
INCREASED ENERGY: 0
SPEECH CHANGE: 0
CHILLS: 0
HOARSE VOICE: 1
BACK PAIN: 1
BLOOD IN STOOL: 0
CONSTIPATION: 0
NECK MASS: 0
SNORES LOUDLY: 0
COUGH: 1
VOMITING: 1
SINUS CONGESTION: 0
WEIGHT GAIN: 0
SMELL DISTURBANCE: 0
DECREASED CONCENTRATION: 1
DEPRESSION: 0
LOSS OF CONSCIOUSNESS: 0
FATIGUE: 1
SHORTNESS OF BREATH: 1
RECTAL PAIN: 0
TREMORS: 0
TASTE DISTURBANCE: 0
ALTERED TEMPERATURE REGULATION: 0
POLYDIPSIA: 0
NECK PAIN: 1
STIFFNESS: 1
MUSCLE CRAMPS: 1
NERVOUS/ANXIOUS: 1
DISTURBANCES IN COORDINATION: 0
HALLUCINATIONS: 0
MYALGIAS: 1
MUSCLE WEAKNESS: 1
DECREASED APPETITE: 0
HEADACHES: 1
POSTURAL DYSPNEA: 1
DIARRHEA: 0
WEIGHT LOSS: 0
BLOATING: 0
MEMORY LOSS: 0
ABDOMINAL PAIN: 0
PARALYSIS: 0
SEIZURES: 0
DIZZINESS: 0
HEMOPTYSIS: 0
POLYPHAGIA: 1
FEVER: 0
SORE THROAT: 0
BOWEL INCONTINENCE: 0
COUGH DISTURBING SLEEP: 1
NIGHT SWEATS: 1
TINGLING: 1
TROUBLE SWALLOWING: 0
WHEEZING: 1
PANIC: 0
SINUS PAIN: 0
WEAKNESS: 1
INSOMNIA: 1
JAUNDICE: 0
ARTHRALGIAS: 1
DYSPNEA ON EXERTION: 0
NUMBNESS: 1
HEARTBURN: 1

## 2017-11-09 NOTE — LETTER
11/9/2017       RE: Swetha Patterson  [address]  [facility] [address]     Dear Colleague,    Thank you for referring your patient, Swetha Patterson, to the [facility] at [facility]. Please see a copy of my visit note below.    REASON FOR CONSULTATION: Consult (2nd opinion for Neck pain. States she has pain all the time. Does not want injections. States they do not work. Referring:  JOSE FRANCISCO GAN. )     REFERRING PHYSICIAN: Jose Francisco Gan   PCP:Roro Chawla    History of Present Illness:    Sarah Patterson is a 47 yoF with PMH significant for tobacco use, COPD/ephysema, asthma, obesity and chronic opioid use presenting with significant neck and paraspinal pain with migraines and shooting pains in her bilateral (R>L) arms. Her back pain and headaches are constant and severe. They have been worsening since April. She has difficulty sleeping. Her shooting arm pains occurs randomly down the lateral aspect of her arm with occasional numbness. She describes infrequent numbness in the tips of her digit 2-5. She denies clumsiness with her hands, difficulty buttoning shirts, or trouble with fine motor skills. Denies balance or bowel/bladder problems. She was previously seen by Dr. Gan at [facility] who suggested ACDF with ICBG if an LORRAINE does not help. She does not think she can handle another injection at this time. The patient is here for a second opinion.    She has never had a history of migraines prior to April, and has not had a work up for migraine to this point. With regard to her smoking, she had a 0.5 ppd smoking history for 38 years. She did quit for 1 year, but started again recently. She currently smokes 4 cigarettes/day, but is on Chantix and actively trying to quit. She said she takes 4-5 10 mg tablets per day for her COPD for the past 2+ years.    Neck 90%, Arm 10%,  Right > Left  Worse:  Twisting,  activity  Better:  Nothing    Previous treament:   Translaminar LORRAINE C5/6 ([place], unknown facility): no help, traumatic experience (patient refuses any further injections)  Aleve/Ibuprofen: minimally helpful  Oxycodone (for COPD per patient) - not helpful      Neck Disability Index (NDI) Questionnaire    Neck Disability Index (NDI) 11/9/2017   SECTION 1 - PAIN INTENSITY The pain is fairly severe at the moment.   SECTION 2 - PERSONAL CARE I can look after myself normally, but it causes extra neck pain.   SECTION 3 - LIFTING I can lift heavy weights, but it gives me extra neck pain.   SECTION 4 - READING I can read as much as I want with moderate neck pain.   SECTION 5 - HEADACHES I have headaches almost all the time.   SECTION 6 - CONCENTRATION I can concentrate fully with slight difficulty.   SECTION 7 - WORK I can do most of my usual work, but no more.   SECTION 8 - DRIVING Does not apply / No answer   SECTION 9 - SLEEPING My sleep is completely disturbed for up to 5-7 hours.   SECTION 10 - RECREATION I am able to engage in only a few of my recreational activities because of neck pain.   Neck Disability Index: Count 9   NDI: Total Score = SUM (points for all 10 findings) 23   Neck Disability in Percent = (Total Score) / 50 * 100 51.11 (%)   Some recent data might be hidden        PROMIS-10 Scores  Global Mental Health Score: 8  Global Physical Health Score: 10  PROMIS TOTAL - SUBSCORES: 18    ROS: A 12-point review of systems was completed and is negative except for otherwise noted above in the history of present illness.    Med Hx:  Past Medical History:   Diagnosis Date     Anxiety state, unspecified      Asthma      Chronic pain     back pain from cyst     Contact dermatitis and other eczema, due to unspecified cause      COPD (chronic obstructive pulmonary disease) (H)      Depressive disorder, not elsewhere classified      Emphysema with chronic bronchitis (H)      Esophageal reflux      Family  "history of ischemic heart disease      Gastro-oesophageal reflux disease      History of emphysema      Hoarseness      HTN, goal below 140/90      Hyperlipidemia LDL goal <130      Liver disease     \"fatty liver\"     Other chronic pain     chest, from coughing     Pneumonia      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)      PONV (postoperative nausea and vomiting)        Surg Hx:  Past Surgical History:   Procedure Laterality Date     ARTHROSCOPY SHOULDER DECOMPRESSION Left 10/21/2015    Procedure: ARTHROSCOPY SHOULDER DECOMPRESSION;  Surgeon: Julien Milian MD;  Location: [facility] OR     BRONCHIAL THERMOPLASTY N/A 11/14/2014    Procedure: BRONCHIAL THERMOPLASTY;  Surgeon: Ward Whitaker MD;  Location: [facility]     BRONCHIAL THERMOPLASTY N/A 12/19/2014    Procedure: BRONCHIAL THERMOPLASTY;  Surgeon: Ward Whitaker MD;  Location: [facility] OR     BRONCHIAL THERMOPLASTY N/A 2/6/2015    Procedure: BRONCHIAL THERMOPLASTY;  Surgeon: Ward Whitaker MD;  Location: [facility] OR     C APPENDECTOMY  at age 17     EXCISE NODE MEDIASTINAL  4/26/2013    Procedure: EXCISE NODE MEDIASTINAL;;  Surgeon: Av Peña MD;  Location:  OR     LAPAROSCOPIC CHOLECYSTECTOMY N/A 10/19/2017    Procedure: LAPAROSCOPIC CHOLECYSTECTOMY;  LAPAROSCOPIC CHOLECYSTECTOMY;  Surgeon: Ney Jerry MD;  Location:  OR     THORACOSCOPY  4/26/2013    Procedure: THORACOSCOPY;  LEFT VIDEO ASSISTED THORACOSCOPY, RESECTION OF POSTERIOR MEDIASTINAL MASS;  Surgeon: Av Peña MD;  Location:  OR     TONSILLECTOMY  as a kid     TONSILLECTOMY         Allergies:  Allergies   Allergen Reactions     Codeine      vomiting     Hydrocodone Nausea and Vomiting     Sulfa Drugs      Nausea     Gabapentin Rash       Meds:  Current Outpatient Prescriptions   Medication     oxyCODONE IR (ROXICODONE) 10 MG tablet     diazepam (VALIUM) 10 MG tablet     magnesium hydroxide (MILK OF MAGNESIA) 400 MG/5ML suspension     hydrOXYzine " (ATARAX) 50 MG tablet     tiZANidine (ZANAFLEX) 4 MG tablet     ondansetron (ZOFRAN) 8 MG tablet     omeprazole (PRILOSEC) 20 MG CR capsule     varenicline (CHANTIX STARTING MONTH PAK) 0.5 MG X 11 & 1 MG X 42 tablet     escitalopram (LEXAPRO) 20 MG tablet     doxepin (SINEQUAN) 10 MG capsule     venlafaxine (EFFEXOR-XR) 37.5 MG 24 hr capsule     fluticasone (FLONASE) 50 MCG/ACT spray     ranitidine (ZANTAC) 75 MG tablet     cetirizine HCl 10 MG CAPS     atorvastatin (LIPITOR) 80 MG tablet     amLODIPine (NORVASC) 10 MG tablet     lisinopril (PRINIVIL/ZESTRIL) 20 MG tablet     montelukast (SINGULAIR) 10 MG tablet     furosemide (LASIX) 20 MG tablet     NICORETTE 2 MG lozenge     budesonide-formoterol (SYMBICORT) 160-4.5 MCG/ACT Inhaler     ipratropium - albuterol 0.5 mg/2.5 mg/3 mL (DUONEB) 0.5-2.5 (3) MG/3ML neb solution     tiotropium (SPIRIVA HANDIHALER) 18 MCG capsule     albuterol (VENTOLIN HFA) 108 (90 BASE) MCG/ACT Inhaler     desonide (DESOWEN) 0.05 % cream     prochlorperazine (COMPAZINE) 5 MG tablet     order for DME     chlorhexidine (HIBICLENS) 4 % external liquid     VITAMIN D, CHOLECALCIFEROL, PO     No current facility-administered medications for this visit.        Fam Hx:  Family History   Problem Relation Age of Onset     HEART DISEASE Mother      murmur, mi     Hypertension Mother      CEREBROVASCULAR DISEASE Mother      Depression Mother      Gallbladder Disease Mother      Asthma Mother      Family History Negative Father      does not know  him     Family History Negative Brother      HEART DISEASE Maternal Grandfather      Asthma Maternal Grandfather      Hypertension Maternal Grandfather      CEREBROVASCULAR DISEASE Maternal Grandfather      DIABETES Maternal Grandfather      Asthma Sister      Hypertension Sister      CEREBROVASCULAR DISEASE Sister      Hypertension Maternal Grandmother      CEREBROVASCULAR DISEASE Maternal Grandmother        P/S Hx:  Social History   Substance Use Topics      "Smoking status: Light Tobacco Smoker     Years: 30.00     Types: Cigarettes     Last attempt to quit: 9/1/2015     Smokeless tobacco: Never Used      Comment: off/on 2 cigs per day     Alcohol use No         Physical Exam:  Very pleasant, healthy appearing, alert, oriented x 3, cooperative.  Not in cardiorespiratory distress. Tearful on occasion when discussing pain.  Ht 1.626 m (5' 4\")  Wt 92.1 kg (203 lb)  LMP 04/15/2016  BMI 34.84 kg/m2  Normal upright posture.    Normal gait without assistive device.  No antalgia / imbalance.    Neck: normal neck posture, able to maintain horizontal gaze. Significant TTP of paraspinal muscles, spinous processes of cervical spine, as well as shoulders.  No deformity, no skin lesions or surgical scars.  Global pain, difficult to localize throughout cervical spine, paraspinal muscles and up into the occiput.  ROM:   Full painless flexion, extension, R and L rotation.    Neuro Exam:  Motor: 5/5 strength for all muscle groups in both UE's.  Sensory:  Intact to light touch in both UE's.     Upper extremity:  Full pulses, pink nailbeds, good capillary / refill.  (-) atrophy / asymmetry.        Imaging:   MRI Cervical Spine 6/20/17: Moderate spinal and biforaminal stenosis secondary to spondylosis and disk herniation, primarily at C5-6.  XR Cervical Spine 11/9/17: Mild degenerative changes of the cervical spine with disk space narrowing and mild osteophyte formation of the endplates of C5-6.    Impression:   - Cervical spondylosis with disk herniation at C5-6.  - Cervicalgia.    Plan:   ACDF with iliac crest bone graft of C5-6.    We had a long discussion regarding Sarah's pain and symptoms including both nonoperative and operative treatment options. She has failed nonoperative treatment with medications and injections thus far, and is unwilling to try other injections due to her medical comorbidities and history of traumatic experience. We discussed the possibility that the disk " herniation may not be the primary origin of her pain and thus the possibility of incomplete symptom relief with surgical intervention. She understands, but does not feel she can live with her current symptoms. We also discussed her specific risk factors including smoking, obesity, lung disease, and chronic opioid use. We discussed the risks and benefits of surgery including incomplete symptom relief, risk of nonunion and need for repeat surgery, infection, bleeding, swallowing problems, speech problems, and the general risks of anesthesia. She would like to proceed with C5-6 ACDF with ICBG. She should be seen by her PCP and anesthesia PAC clinic prior to surgery. She also understands that she will need to be completely off smoking prior to surgery. Once she is medically optimized for surgery, we can schedule her for surgery.    All questions and concerns were answered to the patient's apparent satisfaction before leaving the clinic.     Total visit time > 30 mins, > 50% counseling and coordination of care.      Rey oBrrego MD  Orthopaedic Surgery, PGY-1  [phone]      I personally saw and evaluated patient with PGY-1 Salima, and I agree with findings and plan outlined in his note.  47/f, with chronic sxs of primarily axial neck pain and migraine-type headaches, and some R>L arm pain/tingling/numbness down to her fingers (2nd-5th) with no clear dermatomal pattern.  Unhappy with sxs; had tried and failed nonop treatment, including translaminar LORRAINE (which was traumatic for her) and pain meds.  Is desperate and would like to now consider surgery.  Had seen another ortho surgeon (Dr. Benson), who allegedly offered her surgery (ACDF C5-6), but required her to first undergo a selective nerve root block, which she refuses to do.    Of note, has factors that may adversely affect likelihood of success with surgery:  - on chronic oxycodone 10 mg tabs, 4-5 times/day; per patient, she has been taking this for COPD.  -  medical co-morbidities (COPD)  - chronic smoker (actively trying to quit, on Chantix, currently 4 cigarettes/day)    MRI shows hnp/stenosis C5-6, may be pinching bilateral C6 nerve roots.    A>   - Cervical hnp/stenosis C5-6  - Bilateral R>L C6 radicular pain.  - Chronic headached, possibly with cericogenic component.  - Opoioid dependence (oxycodone 40-50 mg/day)  - Chronic lung disease  - Chronic active smoking (currently 4 cigs/day; in process of cessation).    P>  Had long discussion with patient re her symptoms, imaging findings, our diagnosis.  Neck pain, headaches, and arm symptoms may be contributed to by spinal finding of stenosis C5-6.  However, there may also be other causes of these symptoms, and it is very difficult to tell how much her spine is contributing or whether it is contributing at all.  For the C5-6 problem, as surgeons, we can offer single level fusion (ACDF).  However, whether this will help her or not depends on whether this problem is contributing significantly to her symptoms.  Discussed rationale, risks, benefits, alternatives.    I explained the rationale for the C6 nerve root block, primarily as a diagnostic tool, to help predict whether surgery will help and thus worthwhile to go through.  However, patient adamant against any injection, citing her previous traumatic experience, and would rather take the chance and risk possible non- or inadequate improvement.    We also discussed her other risk factors (smoking, opioid dependence, lung disease), and how these may adversely affect surgical outcomes.     Elects to proceed with surgery.  Surg request placed: ACDF with plate fixation C5-6; Anterior ICBG harvest.  - PAC referral; including pain recommendations.  - Smoking cessation.  - Opioid wean-down if feasible; at the very least, not to increase current dose.    TT > 45 mins, > 50% CC.      Again, thank you for allowing me to participate in the care of your patient.       Sincerely,    Agustin Palomares MD

## 2017-11-09 NOTE — PROGRESS NOTES
REASON FOR CONSULTATION: Consult (2nd opinion for Neck pain. States she has pain all the time. Does not want injections. States they do not work. Referring:  JOSE FRANCISCO GAN. )     REFERRING PHYSICIAN: Jose Francisco Gan   PCP:Roro Chawla    History of Present Illness:    Sarah Patterson is a 47 yoF with PMH significant for tobacco use, COPD/ephysema, asthma, obesity and chronic opioid use presenting with significant neck and paraspinal pain with migraines and shooting pains in her bilateral (R>L) arms. Her back pain and headaches are constant and severe. They have been worsening since April. She has difficulty sleeping. Her shooting arm pains occurs randomly down the lateral aspect of her arm with occasional numbness. She describes infrequent numbness in the tips of her digit 2-5. She denies clumsiness with her hands, difficulty buttoning shirts, or trouble with fine motor skills. Denies balance or bowel/bladder problems. She was previously seen by Dr. Gan at [facility] who suggested ACDF with IC[facility] if an LORRAINE does not help. She does not think she can handle another injection at this time. The patient is here for a second opinion.    She has never had a history of migraines prior to April, and has not had a work up for migraine to this point. With regard to her smoking, she had a 0.5 ppd smoking history for 38 years. She did quit for 1 year, but started again recently. She currently smokes 4 cigarettes/day, but is on Chantix and actively trying to quit. She said she takes 4-5 10 mg tablets per day for her COPD for the past 2+ years.    Neck 90%, Arm 10%,  Right > Left  Worse:  Twisting, activity  Better:  Nothing    Previous treament:   Translaminar LORRAINE C5/6 ([place], unknown facility): no help, traumatic experience (patient refuses any further injections)  Aleve/Ibuprofen: minimally helpful  Oxycodone (for COPD per patient) - not helpful      Neck Disability Index  "(NDI) Questionnaire    Neck Disability Index (NDI) 11/9/2017   SECTION 1 - PAIN INTENSITY The pain is fairly severe at the moment.   SECTION 2 - PERSONAL CARE I can look after myself normally, but it causes extra neck pain.   SECTION 3 - LIFTING I can lift heavy weights, but it gives me extra neck pain.   SECTION 4 - READING I can read as much as I want with moderate neck pain.   SECTION 5 - HEADACHES I have headaches almost all the time.   SECTION 6 - CONCENTRATION I can concentrate fully with slight difficulty.   SECTION 7 - WORK I can do most of my usual work, but no more.   SECTION 8 - DRIVING Does not apply / No answer   SECTION 9 - SLEEPING My sleep is completely disturbed for up to 5-7 hours.   SECTION 10 - RECREATION I am able to engage in only a few of my recreational activities because of neck pain.   Neck Disability Index: Count 9   NDI: Total Score = SUM (points for all 10 findings) 23   Neck Disability in Percent = (Total Score) / 50 * 100 51.11 (%)   Some recent data might be hidden        PROMIS-10 Scores  Global Mental Health Score: 8  Global Physical Health Score: 10  PROMIS TOTAL - SUBSCORES: 18    ROS: A 12-point review of systems was completed and is negative except for otherwise noted above in the history of present illness.    Med Hx:  Past Medical History:   Diagnosis Date     Anxiety state, unspecified      Asthma      Chronic pain     back pain from cyst     Contact dermatitis and other eczema, due to unspecified cause      COPD (chronic obstructive pulmonary disease) (H)      Depressive disorder, not elsewhere classified      Emphysema with chronic bronchitis (H)      Esophageal reflux      Family history of ischemic heart disease      Gastro-oesophageal reflux disease      History of emphysema      Hoarseness      HTN, goal below 140/90      Hyperlipidemia LDL goal <130      Liver disease     \"fatty liver\"     Other chronic pain     chest, from coughing     Pneumonia      Polyp of vocal " cord or larynx (aka POLYPS)      PONV (postoperative nausea and vomiting)        Surg Hx:  Past Surgical History:   Procedure Laterality Date     ARTHROSCOPY SHOULDER DECOMPRESSION Left 10/21/2015    Procedure: ARTHROSCOPY SHOULDER DECOMPRESSION;  Surgeon: Julien Milian MD;  Location: [facility]     BRONCHIAL THERMOPLASTY N/A 11/14/2014    Procedure: BRONCHIAL THERMOPLASTY;  Surgeon: Ward Whitaker MD;  Location: [facility]     BRONCHIAL THERMOPLASTY N/A 12/19/2014    Procedure: BRONCHIAL THERMOPLASTY;  Surgeon: Ward Whitaker MD;  Location: [facility]     BRONCHIAL THERMOPLASTY N/A 2/6/2015    Procedure: BRONCHIAL THERMOPLASTY;  Surgeon: Ward Whitaker MD;  Location: [facility]     C APPENDECTOMY  at age 17     EXCISE NODE MEDIASTINAL  4/26/2013    Procedure: EXCISE NODE MEDIASTINAL;;  Surgeon: Av Peña MD;  Location: [facility]     LAPAROSCOPIC CHOLECYSTECTOMY N/A 10/19/2017    Procedure: LAPAROSCOPIC CHOLECYSTECTOMY;  LAPAROSCOPIC CHOLECYSTECTOMY;  Surgeon: Ney Jerry MD;  Location: [facility] OR     THORACOSCOPY  4/26/2013    Procedure: THORACOSCOPY;  LEFT VIDEO ASSISTED THORACOSCOPY, RESECTION OF POSTERIOR MEDIASTINAL MASS;  Surgeon: Av Peña MD;  Location: [facility] OR     TONSILLECTOMY  as a kid     TONSILLECTOMY         Allergies:  Allergies   Allergen Reactions     Codeine      vomiting     Hydrocodone Nausea and Vomiting     Sulfa Drugs      Nausea     Gabapentin Rash       Meds:  Current Outpatient Prescriptions   Medication     oxyCODONE IR (ROXICODONE) 10 MG tablet     diazepam (VALIUM) 10 MG tablet     magnesium hydroxide (MILK OF MAGNESIA) 400 MG/5ML suspension     hydrOXYzine (ATARAX) 50 MG tablet     tiZANidine (ZANAFLEX) 4 MG tablet     ondansetron (ZOFRAN) 8 MG tablet     omeprazole (PRILOSEC) 20 MG CR capsule     varenicline (CHANTIX STARTING MONTH PAK) 0.5 MG X 11 & 1 MG X 42 tablet     escitalopram (LEXAPRO) 20 MG tablet     doxepin (SINEQUAN) 10 MG capsule      venlafaxine (EFFEXOR-XR) 37.5 MG 24 hr capsule     fluticasone (FLONASE) 50 MCG/ACT spray     ranitidine (ZANTAC) 75 MG tablet     cetirizine HCl 10 MG CAPS     atorvastatin (LIPITOR) 80 MG tablet     amLODIPine (NORVASC) 10 MG tablet     lisinopril (PRINIVIL/ZESTRIL) 20 MG tablet     montelukast (SINGULAIR) 10 MG tablet     furosemide (LASIX) 20 MG tablet     NICORETTE 2 MG lozenge     budesonide-formoterol (SYMBICORT) 160-4.5 MCG/ACT Inhaler     ipratropium - albuterol 0.5 mg/2.5 mg/3 mL (DUONEB) 0.5-2.5 (3) MG/3ML neb solution     tiotropium (SPIRIVA HANDIHALER) 18 MCG capsule     albuterol (VENTOLIN HFA) 108 (90 BASE) MCG/ACT Inhaler     desonide (DESOWEN) 0.05 % cream     prochlorperazine (COMPAZINE) 5 MG tablet     order for DME     chlorhexidine (HIBICLENS) 4 % external liquid     VITAMIN D, CHOLECALCIFEROL, PO     No current facility-administered medications for this visit.        Fam Hx:  Family History   Problem Relation Age of Onset     HEART DISEASE Mother      murmur, mi     Hypertension Mother      CEREBROVASCULAR DISEASE Mother      Depression Mother      Gallbladder Disease Mother      Asthma Mother      Family History Negative Father      does not know  him     Family History Negative Brother      HEART DISEASE Maternal Grandfather      Asthma Maternal Grandfather      Hypertension Maternal Grandfather      CEREBROVASCULAR DISEASE Maternal Grandfather      DIABETES Maternal Grandfather      Asthma Sister      Hypertension Sister      CEREBROVASCULAR DISEASE Sister      Hypertension Maternal Grandmother      CEREBROVASCULAR DISEASE Maternal Grandmother        P/S Hx:  Social History   Substance Use Topics     Smoking status: Light Tobacco Smoker     Years: 30.00     Types: Cigarettes     Last attempt to quit: 9/1/2015     Smokeless tobacco: Never Used      Comment: off/on 2 cigs per day     Alcohol use No         Physical Exam:  Very pleasant, healthy appearing, alert, oriented x 3, cooperative.  Not  "in cardiorespiratory distress. Tearful on occasion when discussing pain.  Ht 1.626 m (5' 4\")  Wt 92.1 kg (203 lb)  LMP 04/15/2016  BMI 34.84 kg/m2  Normal upright posture.    Normal gait without assistive device.  No antalgia / imbalance.    Neck: normal neck posture, able to maintain horizontal gaze. Significant TTP of paraspinal muscles, spinous processes of cervical spine, as well as shoulders.  No deformity, no skin lesions or surgical scars.  Global pain, difficult to localize throughout cervical spine, paraspinal muscles and up into the occiput.  ROM:   Full painless flexion, extension, R and L rotation.    Neuro Exam:  Motor: 5/5 strength for all muscle groups in both UE's.  Sensory:  Intact to light touch in both UE's.     Upper extremity:  Full pulses, pink nailbeds, good capillary / refill.  (-) atrophy / asymmetry.        Imaging:   MRI Cervical Spine 6/20/17: Moderate spinal and biforaminal stenosis secondary to spondylosis and disk herniation, primarily at C5-6.  XR Cervical Spine 11/9/17: Mild degenerative changes of the cervical spine with disk space narrowing and mild osteophyte formation of the endplates of C5-6.    Impression:   - Cervical spondylosis with disk herniation at C5-6.  - Cervicalgia.    Plan:   ACDF with iliac crest bone graft of C5-6.    We had a long discussion regarding Sabina pain and symptoms including both nonoperative and operative treatment options. She has failed nonoperative treatment with medications and injections thus far, and is unwilling to try other injections due to her medical comorbidities and history of traumatic experience. We discussed the possibility that the disk herniation may not be the primary origin of her pain and thus the possibility of incomplete symptom relief with surgical intervention. She understands, but does not feel she can live with her current symptoms. We also discussed her specific risk factors including smoking, obesity, lung disease, " and chronic opioid use. We discussed the risks and benefits of surgery including incomplete symptom relief, risk of nonunion and need for repeat surgery, infection, bleeding, swallowing problems, speech problems, and the general risks of anesthesia. She would like to proceed with C5-6 ACDF with ICBG. She should be seen by her PCP and anesthesia PAC clinic prior to surgery. She also understands that she will need to be completely off smoking prior to surgery. Once she is medically optimized for surgery, we can schedule her for surgery.    All questions and concerns were answered to the patient's apparent satisfaction before leaving the clinic.     Total visit time > 30 mins, > 50% counseling and coordination of care.      Rey Borrego MD  Orthopaedic Surgery, PGY-1  [phone]      Answers for HPI/ROS submitted by the patient on 11/9/2017   General Symptoms: Yes  Skin Symptoms: No  HENT Symptoms: Yes  EYE SYMPTOMS: No  HEART SYMPTOMS: No  LUNG SYMPTOMS: Yes  INTESTINAL SYMPTOMS: Yes  URINARY SYMPTOMS: No  GYNECOLOGIC SYMPTOMS: No  BREAST SYMPTOMS: No  SKELETAL SYMPTOMS: Yes  BLOOD SYMPTOMS: No  NERVOUS SYSTEM SYMPTOMS: Yes  MENTAL HEALTH SYMPTOMS: Yes  Fever: No  Loss of appetite: No  Weight loss: No  Weight gain: No  Fatigue: Yes  Night sweats: Yes  Chills: No  Increased stress: Yes  Excessive hunger: Yes  Excessive thirst: No  Feeling hot or cold when others believe the temperature is normal: No  Loss of height: No  Post-operative complications: No  Surgical site pain: No  Hallucinations: No  Change in or Loss of Energy: No  Hyperactivity: Yes  Confusion: No  Ear pain: No  Ear discharge: No  Hearing loss: No  Tinnitus: No  Nosebleeds: No  Congestion: No  Sinus pain: No  Trouble swallowing: No   Voice hoarseness: Yes  Mouth sores: No  Sore throat: No  Tooth pain: No  Gum tenderness: No  Bleeding gums: No  Change in taste: No  Change in sense of smell: No  Dry mouth: No  Hearing aid used: No  Neck lump: No  Cough:  Yes  Sputum or phlegm: No  Coughing up blood: No  Difficulty breating or shortness of breath: Yes  Snoring: No  Wheezing: Yes  Difficulty breathing on exertion: No  Nighttime Cough: Yes  Difficulty breathing when lying flat: Yes  Heart burn or indigestion: Yes  Nausea: Yes  Vomiting: Yes  Abdominal pain: No  Bloating: No  Constipation: No  Diarrhea: No  Blood in stool: No  Black stools: No  Rectal or Anal pain: No  Fecal incontinence: No  Yellowing of skin or eyes: No  Vomit with blood: No  Change in stools: No  Back pain: Yes  Muscle aches: Yes  Neck pain: Yes  Joint pain: Yes  Bone pain: Yes  Muscle cramps: Yes  Muscle weakness: Yes  Joint stiffness: Yes  Bone fracture: No  Trouble with coordination: No  Dizziness or trouble with balance: No  Fainting or black-out spells: No  Memory loss: No  Headache: Yes  Seizures: No  Speech problems: No  Tingling: Yes  Tremor: No  Weakness: Yes  Difficulty walking: No  Paralysis: No  Numbness: Yes  Nervous or Anxious: Yes  Depression: No  Trouble sleeping: Yes  Trouble thinking or concentrating: Yes  Mood changes: Yes  Panic attacks: No

## 2017-11-09 NOTE — NURSING NOTE
"Reason For Visit:   Chief Complaint   Patient presents with     Consult     2nd opinion for Neck pain. States she has pain all the time. Does not want injections. States they do not work. Referring:  CHRISTINA GAN.        Primary MD: Roro Chawla  Ref. MD: Dr. Gan    ?  No  Work status?  On disability.  Date of injury: None    Date of surgery: None    Smoker: Yes, Working on Quitting.       Ht 1.626 m (5' 4\")  Wt 92.1 kg (203 lb)  LMP 04/15/2016  BMI 34.84 kg/m2    Pain Assessment  Patient Currently in Pain: Yes  0-10 Pain Scale: 9  Primary Pain Location: Neck  Pain Descriptors: Constant, Discomfort, Throbbing, Headache  Alleviating Factors: Rest  Aggravating Factors: Movement    Neck Disability Index (NDI) Questionnaire    Neck Disability Index (NDI) 11/9/2017   SECTION 1 - PAIN INTENSITY The pain is fairly severe at the moment.   SECTION 2 - PERSONAL CARE I can look after myself normally, but it causes extra neck pain.   SECTION 3 - LIFTING I can lift heavy weights, but it gives me extra neck pain.   SECTION 4 - READING I can read as much as I want with moderate neck pain.   SECTION 5 - HEADACHES I have headaches almost all the time.   SECTION 6 - CONCENTRATION I can concentrate fully with slight difficulty.   SECTION 7 - WORK I can do most of my usual work, but no more.   SECTION 8 - DRIVING Does not apply / No answer   SECTION 9 - SLEEPING My sleep is completely disturbed for up to 5-7 hours.   SECTION 10 - RECREATION I am able to engage in only a few of my recreational activities because of neck pain.   Neck Disability Index: Count 9   NDI: Total Score = SUM (points for all 10 findings) 23   Neck Disability in Percent = (Total Score) / 50 * 100 51.11 (%)   Some recent data might be hidden              Visual Analog Pain Scale  Neck Pain Scale 0-10: 10  Right arm pain: 9.5  Left arm pain: 9.5    Promis 10 Assessment    PROMIS 10 11/9/2017   In general, would you say " your health is: Fair   In general, would you say your quality of life is: Fair   In general, how would you rate your physical health? Fair   In general, how would you rate your mental health, including your mood and your ability to think? Fair   In general, how would you rate your satisfaction with your social activities and relationships? Fair   In general, please rate how well you carry out your usual social activities and roles Fair   To what extent are you able to carry out your everyday physical activities such as walking, climbing stairs, carrying groceries, or moving a chair? Moderately   How often have you been bothered by emotional problems such as feeling anxious, depressed or irritable? Often   How would you rate your fatigue on average? Moderate   How would you rate your pain on average?   0 = No Pain  to  10 = Worst Imaginable Pain 9   In general, would you say your health is: 2   In general, would you say your quality of life is: 2   In general, how would you rate your physical health? 2   In general, how would you rate your mental health, including your mood and your ability to think? 2   In general, how would you rate your satisfaction with your social activities and relationships? 2   In general, please rate how well you carry out your usual social activities and roles. (This includes activities at home, at work and in your community, and responsibilities as a parent, child, spouse, employee, friend, etc.) 2   To what extent are you able to carry out your everyday physical activities such as walking, climbing stairs, carrying groceries, or moving a chair? 3   In the past 7 days, how often have you been bothered by emotional problems such as feeling anxious, depressed, or irritable? 4   In the past 7 days, how would you rate your fatigue on average? 3   In the past 7 days, how would you rate your pain on average, where 0 means no pain, and 10 means worst imaginable pain? 9   Global Mental Health  Score 8   Global Physical Health Score 10   PROMIS TOTAL - SUBSCORES 18   Some recent data might be hidden                Thuy Cole LPN

## 2017-11-09 NOTE — MR AVS SNAPSHOT
After Visit Summary   11/9/2017    Swetha Patterson    MRN: [id number]           Patient Information     Date Of Birth          1970        Visit Information        Provider Department      11/9/2017 1:00 PM Agustin Palomares MD [facility]        Today's Diagnoses     Cervical HNP C5-6    -  1    Opioid type dependence, continuous (H)           Follow-ups after your visit        Additional Services     [facility]ERR[facility]       Please call [phone] to make an appointment. Clinic is located: [facility] [address] [place] [address] [id number] 4th [place] [address]      Please complete the following questions:    Procedure/Referral: Preoperative pain evaluation and formulation of postop pain treatment plan.    What is your diagnosis for the patient's pain? Disc herniation C5-6; also COPD    What are your specific questions for the pain specialist? Naya/postop pain plan    Are there any red flags that may impact the assessment or management of the patient? None            PAC Visit Referral (For [facility] Only)       Does this visit require an Anesthesia consult?    Will undergo single-level ACDF C5-6 for hnp with chronic neck pain and headaches.  (+) COPD, chronic opioid dependence (oxycodone 10 mg tabs, 2-5 tabs/day); obesity.    H&P done by:  N/A and PAC      Please be aware that coverage of these services is subject to the terms and limitations of your health insurance plan.  Call member services at your health plan with any benefit or coverage questions.      Please bring the following to your appointment:  >>   Any x-rays, CTs or MRIs which have been performed.  Contact the facility where they were done to arrange for  prior to your scheduled appointment.  Any new CT, MRI or other procedures ordered by your specialist must be performed at a [facility] or coordinated by your clinic's referral office.    >>   List of  current medications  >>   This referral request   >>   Any documents/labs given to you for this referral                  Your next 10 appointments already scheduled     Nov 21, 2017  9:20 AM CST   SHORT with Roro Chawla MD   [facility] ([facility]    [address]  [facility] [address]   [phone]            Dec 20, 2017  3:00 PM CST   (Arrive by 2:45 PM)   CT CHEST W/O CONTRAST with UCCT2   [facility] [place] [facility] ([facility])    [address] [place] [address] [phone]           Please bring any scans or X-rays taken at other hospitals, if similar tests were done. Also bring a list of your medicines, including vitamins, minerals and over-the-counter drugs. It is safest to leave personal items at home.  Be sure to tell your doctor:   If you have any allergies.   If there s any chance you are pregnant.   If you are breastfeeding.   If you have any special needs.  You do not need to do anything special to prepare.  Please wear loose clothing, such as a sweat suit or jogging clothes. Avoid snaps, zippers and other metal. We may ask you to undress and put on a hospital gown.            Dec 20, 2017  3:30 PM CST   FULL PULMONARY FUNCTION with  PFL C   [facility] Pulmonary Function Testing ([facility])    [address] [id number] [phone]            Dec 20, 2017  4:30 PM CST   (Arrive by 4:15 PM)   Return Visit with Nita Williamson MD   [facility] ([facility])    [address] [place] [address] [phone]              Who to contact     Please call your clinic at [phone] to:    Ask questions about your health    Make or cancel appointments    Discuss your medicines    Learn about your test results    Speak to your doctor   If you have  "compliments or concerns about an experience at your clinic, or if you wish to file a complaint, please contact [facility] Patient Relations at [phone] or email us at [email or web address]         Additional Information About Your Visit        Continuenthart Information     [employer] is an electronic gateway that provides easy, online access to your medical records. With [employer], you can request a clinic appointment, read your test results, renew a prescription or communicate with your care team.     To sign up for [employer] visit the website at [email or web address]   You will be asked to enter the access code listed below, as well as some personal information. Please follow the directions to create your username and password.     Your access code is: [email or web address]  Expires: 2018  6:42 PM     Your access code will  in 90 days. If you need help or a new code, please contact your [facility] or call [phone] for assistance.        Care EveryWhere ID     This is your Care EveryWhere ID. This could be used by other organizations to access your [facility] medical records  [id number]        Your Vitals Were     Height Last Period BMI (Body Mass Index)             1.626 m (5' 4\") 04/15/2016 34.84 kg/m2          Blood Pressure from Last 3 Encounters:   17 134/80   17 130/88   10/24/17 110/64    Weight from Last 3 Encounters:   17 92.1 kg (203 lb)   17 91.6 kg (202 lb)   17 91.8 kg (202 lb 6.4 oz)              We Performed the Following     [facility]ERRAL     PAC Visit Referral (For [facility] Only)     Naya-Operative Worksheet        Primary Care Provider Office Phone # Fax #    Roro Chawla [phone]       [address]  [facility] [address]        Equal Access to Services     CHARITY MANDUJANO AH: Hadii aad brisa Jin, amanda bowieadakartik, qaybta " caryl amatogualberto cowan ah. Salima [facility] [phone].    ATENCIÓN: Si dylon beach, tiene a vargas disposición servicios gratuitos de asistencia lingüística. Briseida al [phone].    We comply with applicable federal civil rights laws and [place] laws. We do not discriminate on the basis of race, color, national origin, age, disability, sex, sexual orientation, or gender identity.            Thank you!     Thank you for choosing [facility]  for your care. Our goal is always to provide you with excellent care. Hearing back from our patients is one way we can continue to improve our services. Please take a few minutes to complete the written survey that you may receive in the mail after your visit with us. Thank you!             Your Updated Medication List - Protect others around you: Learn how to safely use, store and throw away your medicines at www.disposemymeds.org.          This list is accurate as of: 11/9/17 11:59 PM.  Always use your most recent med list.                   Brand Name Dispense Instructions for use Diagnosis    albuterol 108 (90 BASE) MCG/ACT Inhaler    VENTOLIN HFA    3 Inhaler    Inhale 2 puffs into the lungs every 6 hours    Shortness of breath       amLODIPine 10 MG tablet    NORVASC    90 tablet    Take 1 tablet (10 mg) by mouth daily    HTN, goal below 140/90       atorvastatin 80 MG tablet    LIPITOR    90 tablet    Take 1 tablet (80 mg) by mouth daily    Family history of ischemic heart disease, Hyperlipidemia LDL goal <130       budesonide-formoterol 160-4.5 MCG/ACT Inhaler    SYMBICORT    3 Inhaler    Inhale 2 puffs into the lungs 2 times daily    Uncomplicated asthma, unspecified asthma severity       cetirizine HCl 10 MG Caps     90 capsule    Take 1 capsule by mouth daily    Chronic rhinitis       chlorhexidine 4 % liquid    HIBICLENS    946 mL    Wash ab, groin, thighs daily in shower DO NOT PUT ON FACE    Hidradenitis suppurativa        desonide 0.05 % cream    DESOWEN    60 g    Apply sparingly to affected area three times daily as needed.    Localized pruritus       diazepam 10 MG tablet    VALIUM    90 tablet    Take 1 tablet (10 mg) by mouth every 8 hours as needed for anxiety or sleep For sleep, anxiety, and muscle tension.    Chest wall pain, Muscle pain, Anxiety       doxepin 10 MG capsule    SINEquan    30 capsule    Take 1 capsule (10 mg) by mouth At Bedtime    Persistent insomnia       escitalopram 20 MG tablet    LEXAPRO    90 tablet    Take 1 tablet (20 mg) by mouth daily Increased dose.    Anxiety       fluticasone 50 MCG/ACT spray    FLONASE    16 g    Spray 2 sprays into both nostrils daily    Severe chronic obstructive pulmonary disease (H)       furosemide 20 MG tablet    LASIX    90 tablet    Take 1 tablet (20 mg) by mouth daily as needed    Bilateral leg edema       ipratropium - albuterol 0.5 mg/2.5 mg/3 mL 0.5-2.5 (3) MG/3ML neb solution    DUONEB    360 mL    Take 1 vial (3 mLs) by nebulization every 6 hours    Uncomplicated asthma, unspecified asthma severity       lisinopril 20 MG tablet    PRINIVIL/ZESTRIL    90 tablet    Take 1 tablet (20 mg) by mouth daily    Essential hypertension with goal blood pressure less than 140/90       magnesium hydroxide 400 MG/5ML suspension    MILK OF MAGNESIA    360 mL    Take 30 mLs by mouth every 4 hours as needed for constipation or heartburn        montelukast 10 MG tablet    SINGULAIR    30 tablet    Take 1 tablet (10 mg) by mouth At Bedtime    Dust allergy, Severe chronic obstructive pulmonary disease (H)       NICORETTE 2 MG lozenge   Generic drug:  nicotine polacrilex      PLACE 1 LOZENGE INSIDE CHEEK Q HOUR PRF SMOKING CESSATION UTD        omeprazole 20 MG CR capsule    priLOSEC    30 capsule    Take 1 capsule (20 mg) by mouth 2 times daily    Acute gastritis, presence of bleeding unspecified, unspecified gastritis type       ondansetron 8 MG tablet    ZOFRAN    30 tablet    Take  0.5-1 tablets (4-8 mg) by mouth every 8 hours as needed for nausea    Nausea       order for DME     1 Device    Equipment being ordered: Nebulizer machine Length of need-lifetime    Chronic lung disease, Cough, Severe persistent asthma with acute exacerbation       oxyCODONE IR 10 MG tablet    ROXICODONE    70 tablet    5 times daily as needed    Chest wall pain, Severe chronic obstructive pulmonary disease (H), Continuous opioid dependence (H)       prochlorperazine 5 MG tablet    COMPAZINE    90 tablet    Take 1 tablet (5 mg) by mouth every 6 hours as needed for nausea or vomiting    Nausea       ranitidine 75 MG tablet    ZANTAC    90 tablet    Take 2 tablets (150 mg) by mouth daily    Uncomplicated asthma, unspecified asthma severity       tiotropium 18 MCG capsule    SPIRIVA HANDIHALER    30 capsule    Inhale contents of one capsule daily.    Other emphysema (H)       tiZANidine 4 MG tablet    ZANAFLEX     TK 1 T PO  TID PRF MUSCLE SPASM        varenicline 0.5 MG X 11 & 1 MG X 42 tablet    CHANTIX STARTING MONTH CHAPITO    53 tablet    Take 0.5 mg tab daily for 3 days, then 0.5 mg tab twice daily for 4 days, then 1 mg twice daily.    Encounter for smoking cessation counseling       venlafaxine 37.5 MG 24 hr capsule    EFFEXOR-XR    46 capsule    Take 1 capsule daily for 14 days, then take 2 capsules daily.    Menopausal syndrome (hot flashes)       VITAMIN D (CHOLECALCIFEROL) PO      Take 2,000 Units by mouth daily

## 2017-11-12 VITALS
BODY MASS INDEX: 34.56 KG/M2 | TEMPERATURE: 98.5 F | OXYGEN SATURATION: 97 % | SYSTOLIC BLOOD PRESSURE: 130 MMHG | WEIGHT: 202.4 LBS | HEIGHT: 64 IN | HEART RATE: 84 BPM | DIASTOLIC BLOOD PRESSURE: 88 MMHG

## 2017-11-13 DIAGNOSIS — F41.9 ANXIETY: ICD-10-CM

## 2017-11-13 NOTE — PROGRESS NOTES
"[facility] Consultation    CHIEF COMPLAINT:  Chief Complaint   Patient presents with     Consult     Hiatal hernia        HISTORY OF PRESENT ILLNESS:  Swetha Patterson is a 47 year old female who is seen in consultation at the request of Dr. Jerry for evaluation of difficulty swallowing.  She has been having abdominal pain for long time for what she takes chronic narcotics.  Her complaint is mainly upper abdominal pain.  Recently she had a lap jocelyn, her symptoms did not improved.  On EGD there is small sliding hiatal hernia.  CT scan reviewed, the EG junction appears in the abdominal cavity, as is the entire stomach.    REVIEW OF SYSTEMS:  Constitutional:  Negative for chills, fatigue, fever and weight change.  Eyes:  Negative for new vision problems.  ENT:  Negative for ENT pain.  Cardiovascular:  Negative for chest pain, palpitations.  Respiratory:  Negative for cough, dyspnea.  Gastrointestinal:  See HPI  Musculoskeletal:  Negative for new arthralgias or myalgias.      Past Medical History:   Diagnosis Date     Anxiety state, unspecified      Asthma      Chronic pain     back pain from cyst     Contact dermatitis and other eczema, due to unspecified cause      COPD (chronic obstructive pulmonary disease) (H)      Depressive disorder, not elsewhere classified      Emphysema with chronic bronchitis (H)      Esophageal reflux      Family history of ischemic heart disease      Gastro-oesophageal reflux disease      History of emphysema      Hoarseness      HTN, goal below 140/90      Hyperlipidemia LDL goal <130      Liver disease     \"fatty liver\"     Other chronic pain     chest, from coughing     Pneumonia      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)      PONV (postoperative nausea and vomiting)        Past Surgical History:   Procedure Laterality Date     ARTHROSCOPY SHOULDER DECOMPRESSION Left 10/21/2015    Procedure: ARTHROSCOPY SHOULDER DECOMPRESSION;  Surgeon: Julien Milian MD;  " Location: [facility] OR     BRONCHIAL THERMOPLASTY N/A 11/14/2014    Procedure: BRONCHIAL THERMOPLASTY;  Surgeon: Ward Whitaker MD;  Location: [facility]     BRONCHIAL THERMOPLASTY N/A 12/19/2014    Procedure: BRONCHIAL THERMOPLASTY;  Surgeon: Ward Whitaker MD;  Location: [facility]     BRONCHIAL THERMOPLASTY N/A 2/6/2015    Procedure: BRONCHIAL THERMOPLASTY;  Surgeon: Ward Whitaker MD;  Location: [facility] OR     C APPENDECTOMY  at age 17     EXCISE NODE MEDIASTINAL  4/26/2013    Procedure: EXCISE NODE MEDIASTINAL;;  Surgeon: Av Peña MD;  Location: [facility] OR     LAPAROSCOPIC CHOLECYSTECTOMY N/A 10/19/2017    Procedure: LAPAROSCOPIC CHOLECYSTECTOMY;  LAPAROSCOPIC CHOLECYSTECTOMY;  Surgeon: Ney Jerry MD;  Location: [facility] OR     THORACOSCOPY  4/26/2013    Procedure: THORACOSCOPY;  LEFT VIDEO ASSISTED THORACOSCOPY, RESECTION OF POSTERIOR MEDIASTINAL MASS;  Surgeon: Av Peña MD;  Location: [facility] OR     TONSILLECTOMY  as a kid     TONSILLECTOMY         Family History has been reviewed.    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)     Chr PAIN - Ctr SUBSTANCE AGREEMENT - SIGNED     Chr Lung Disease - managed by the pulm dept ([facility])^^^     Continuous opioid dependence ([facility]) opioid for chr cough and chest pain ^^^^     DIAZ (nonalcoholic steatohepatitis)     Essential hypertension with goal blood pressure less than 140/90     Cervical HNP C5-6     Opioid type dependence, continuous (H)        Allergies   Allergen Reactions     Codeine      vomiting     Hydrocodone Nausea and Vomiting     Sulfa Drugs      Nausea     Gabapentin Rash       Current Outpatient Prescriptions   Medication Sig Dispense Refill     oxyCODONE IR (ROXICODONE) 10 MG tablet 5 times daily as needed 70 tablet 0     diazepam (VALIUM) 10 MG tablet Take 1 tablet (10 mg) by mouth every 8 hours as needed for anxiety or sleep For sleep, anxiety, and muscle tension. 90 tablet 0     magnesium hydroxide (MILK OF MAGNESIA) 400 MG/5ML suspension Take 30 mLs by mouth every 4 hours as needed for constipation or heartburn 360 mL 1     hydrOXYzine (ATARAX) 50 MG tablet Take 1 tablet (50 mg) by mouth 4 times daily as needed for anxiety Increased dose. For anxiety 120 tablet 0     tiZANidine (ZANAFLEX) 4 MG tablet TK 1 T PO  TID PRF MUSCLE SPASM  0     ondansetron (ZOFRAN) 8 MG tablet Take 0.5-1 tablets (4-8 mg) by mouth every 8 hours as needed for nausea 30 tablet 0     omeprazole (PRILOSEC) 20 MG CR capsule Take 1 capsule (20 mg) by mouth 2 times daily 30 capsule 0     varenicline (CHANTIX STARTING MONTH PAK) 0.5 MG X 11 & 1 MG X 42 tablet Take 0.5 mg tab daily for 3 days, then 0.5 mg tab twice daily for 4 days, then 1 mg twice daily. 53 tablet 0     escitalopram (LEXAPRO) 20 MG tablet Take 1 tablet (20 mg) by mouth daily Increased dose. 90 tablet 1     doxepin (SINEQUAN) 10 MG capsule Take 1 capsule (10 mg) by mouth At Bedtime 30 capsule 1     venlafaxine (EFFEXOR-XR) 37.5 MG 24 hr capsule Take 1 capsule daily for 14 days, then take 2 capsules daily. 46 capsule 0     fluticasone (FLONASE) 50 MCG/ACT spray Spray 2 sprays into both nostrils daily 16 g 11     ranitidine (ZANTAC) 75 MG tablet Take 2 tablets (150 mg) by mouth daily 90 tablet 1     cetirizine HCl 10 MG CAPS Take 1 capsule by mouth daily 90 capsule 3     atorvastatin (LIPITOR) 80 MG tablet Take 1 tablet (80 mg) by mouth daily 90 tablet 1     amLODIPine (NORVASC) 10 MG tablet Take 1  "tablet (10 mg) by mouth daily 90 tablet 1     lisinopril (PRINIVIL/ZESTRIL) 20 MG tablet Take 1 tablet (20 mg) by mouth daily 90 tablet 1     montelukast (SINGULAIR) 10 MG tablet Take 1 tablet (10 mg) by mouth At Bedtime 30 tablet 1     furosemide (LASIX) 20 MG tablet Take 1 tablet (20 mg) by mouth daily as needed 90 tablet 1     NICORETTE 2 MG lozenge PLACE 1 LOZENGE INSIDE CHEEK Q HOUR PRF SMOKING CESSATION UTD  1     budesonide-formoterol (SYMBICORT) 160-4.5 MCG/ACT Inhaler Inhale 2 puffs into the lungs 2 times daily 3 Inhaler 3     ipratropium - albuterol 0.5 mg/2.5 mg/3 mL (DUONEB) 0.5-2.5 (3) MG/3ML neb solution Take 1 vial (3 mLs) by nebulization every 6 hours 360 mL 0     tiotropium (SPIRIVA HANDIHALER) 18 MCG capsule Inhale contents of one capsule daily. 30 capsule 1     albuterol (VENTOLIN HFA) 108 (90 BASE) MCG/ACT Inhaler Inhale 2 puffs into the lungs every 6 hours 3 Inhaler 3     desonide (DESOWEN) 0.05 % cream Apply sparingly to affected area three times daily as needed. 60 g 0     prochlorperazine (COMPAZINE) 5 MG tablet Take 1 tablet (5 mg) by mouth every 6 hours as needed for nausea or vomiting 90 tablet 0     order for DME Equipment being ordered: Nebulizer machine  Length of need-lifetime 1 Device 0     chlorhexidine (HIBICLENS) 4 % external liquid Wash ab, groin, thighs daily in shower DO NOT PUT ON FACE 946 mL 3     VITAMIN D, CHOLECALCIFEROL, PO Take 2,000 Units by mouth daily         Vitals: /80  Pulse 68  Ht 5' 4\" (1.626 m)  Wt 202 lb (91.6 kg)  LMP 04/15/2016  BMI 34.67 kg/m2  BMI= Body mass index is 34.67 kg/(m^2).    EXAM:  GENERAL: healthy, alert and anxious    HEENT: moist mucus membranes, no scleral icterus  CARDIOVASCULAR:  RRR, No JVD  RESPIRATORY: non labored breathing  NECK: Neck supple. No noticeable masses.  ABDOMEN: soft, nontender, nondistended  Extremities: warm and well perfused, no edema  SKIN: No suspicious lesions or rashes      LABS/Imaging: reviewed endoscopy " and imaging studies    ASSESSMENT:  Swetha Patterson suffers from   1. Difficulty swallowing  2. Chronic abdominal pain  3. opioid addiction  4. nicottine addiciton    - GASTROENTEROLOGY ADULT REF CONSULT ONLY      PLAN:    Given the symptoms, studies findings, and history it does not appear her complaints are rooted on this small hiatal hernia.  Maybe she would benefit from further motility/GI evaluation, addictions management for opioids and nicotine, and weight loss.       Thank you for this consultation.     If you have any questions please give me a call.    Best regards,  Mayank Blanco MD    Please route or send letter to:  Primary Care Provider (PCP), Referring Provider and Include Progress Note    Total time with patient visit: 30 minutes more than half spent in counseling, explanation of procedures and coordination of care.

## 2017-11-13 NOTE — TELEPHONE ENCOUNTER
Patient asking for Hydroxyzine refill, has been taking more than ordered due to her anxiety worsening with health issues recently.  Ordered as 50 mg four times daily as needed but she has been adding another dose daily to take the edge off.    Hydroxyzine 50 mg      Last Written Prescription Date:  10/12/17  Last Fill Quantity: 120,   # refills: 0  Future Office visit:    Next 5 appointments (look out 90 days)     Nov 21, 2017  9:20 AM CST   SHORT with Roro [facility]la MD   [facility]    [address]  [facility] [address]   [phone]                   Routing refill request to provider for review/approval because:  Drug not on the [facility], [facility] or [facility] refill protocol or controlled substance

## 2017-11-14 RX ORDER — HYDROXYZINE HYDROCHLORIDE 50 MG/1
50 TABLET, FILM COATED ORAL 4 TIMES DAILY PRN
Qty: 120 TABLET | Refills: 0 | Status: SHIPPED | OUTPATIENT
Start: 2017-11-14 | End: 2017-12-12

## 2017-11-18 NOTE — PROGRESS NOTES
I personally saw and evaluated patient with PGY-1 Salima, and I agree with findings and plan outlined in his note.  47/f, with chronic sxs of primarily axial neck pain and migraine-type headaches, and some R>L arm pain/tingling/numbness down to her fingers (2nd-5th) with no clear dermatomal pattern.  Unhappy with sxs; had tried and failed nonop treatment, including translaminar LORRAINE (which was traumatic for her) and pain meds.  Is desperate and would like to now consider surgery.  Had seen another ortho surgeon (Dr. Benson), who allegedly offered her surgery (ACDF C5-6), but required her to first undergo a selective nerve root block, which she refuses to do.    Of note, has factors that may adversely affect likelihood of success with surgery:  - on chronic oxycodone 10 mg tabs, 4-5 times/day; per patient, she has been taking this for COPD.  - medical co-morbidities (COPD)  - chronic smoker (actively trying to quit, on Chantix, currently 4 cigarettes/day)    MRI shows hnp/stenosis C5-6, may be pinching bilateral C6 nerve roots.    A>   - Cervical hnp/stenosis C5-6  - Bilateral R>L C6 radicular pain.  - Chronic headached, possibly with cericogenic component.  - Opoioid dependence (oxycodone 40-50 mg/day)  - Chronic lung disease  - Chronic active smoking (currently 4 cigs/day; in process of cessation).    P>  Had long discussion with patient re her symptoms, imaging findings, our diagnosis.  Neck pain, headaches, and arm symptoms may be contributed to by spinal finding of stenosis C5-6.  However, there may also be other causes of these symptoms, and it is very difficult to tell how much her spine is contributing or whether it is contributing at all.  For the C5-6 problem, as surgeons, we can offer single level fusion (ACDF).  However, whether this will help her or not depends on whether this problem is contributing significantly to her symptoms.  Discussed rationale, risks, benefits, alternatives.    I explained the  rationale for the C6 nerve root block, primarily as a diagnostic tool, to help predict whether surgery will help and thus worthwhile to go through.  However, patient adamant against any injection, citing her previous traumatic experience, and would rather take the chance and risk possible non- or inadequate improvement.    We also discussed her other risk factors (smoking, opioid dependence, lung disease), and how these may adversely affect surgical outcomes.     Elects to proceed with surgery.  Surg request placed: ACDF with plate fixation C5-6; Anterior ICBG harvest.  - PAC referral; including pain recommendations.  - Smoking cessation.  - Opioid wean-down if feasible; at the very least, not to increase current dose.    TT > 45 mins, > 50% CC.

## 2017-11-21 ENCOUNTER — OFFICE VISIT (OUTPATIENT)
Dept: INTERNAL MEDICINE | Facility: CLINIC | Age: 47
End: 2017-11-21
Payer: MEDICARE

## 2017-11-21 VITALS
OXYGEN SATURATION: 98 % | BODY MASS INDEX: 34.97 KG/M2 | DIASTOLIC BLOOD PRESSURE: 70 MMHG | TEMPERATURE: 98.6 F | HEIGHT: 64 IN | SYSTOLIC BLOOD PRESSURE: 118 MMHG | HEART RATE: 89 BPM | WEIGHT: 204.8 LBS

## 2017-11-21 DIAGNOSIS — F11.20 CONTINUOUS OPIOID DEPENDENCE (H): Primary | Chronic | ICD-10-CM

## 2017-11-21 DIAGNOSIS — J44.9 SEVERE CHRONIC OBSTRUCTIVE PULMONARY DISEASE (H): Chronic | ICD-10-CM

## 2017-11-21 DIAGNOSIS — E78.5 HYPERLIPIDEMIA LDL GOAL <130: ICD-10-CM

## 2017-11-21 DIAGNOSIS — R07.89 CHEST WALL PAIN: ICD-10-CM

## 2017-11-21 DIAGNOSIS — R60.0 BILATERAL LEG EDEMA: ICD-10-CM

## 2017-11-21 DIAGNOSIS — I10 HTN, GOAL BELOW 140/90: Chronic | ICD-10-CM

## 2017-11-21 DIAGNOSIS — F41.9 ANXIETY: ICD-10-CM

## 2017-11-21 DIAGNOSIS — J20.9 ACUTE BRONCHITIS, UNSPECIFIED ORGANISM: ICD-10-CM

## 2017-11-21 PROCEDURE — 99214 OFFICE O/P EST MOD 30 MIN: CPT | Performed by: INTERNAL MEDICINE

## 2017-11-21 RX ORDER — LISINOPRIL 20 MG/1
20 TABLET ORAL DAILY
Qty: 90 TABLET | Refills: 1 | Status: ON HOLD | OUTPATIENT
Start: 2017-11-21 | End: 2018-02-09

## 2017-11-21 RX ORDER — ESCITALOPRAM OXALATE 20 MG/1
20 TABLET ORAL DAILY
Qty: 90 TABLET | Refills: 1 | Status: ON HOLD | OUTPATIENT
Start: 2017-11-21 | End: 2018-02-09

## 2017-11-21 RX ORDER — FUROSEMIDE 20 MG
20 TABLET ORAL DAILY PRN
Qty: 90 TABLET | Refills: 1 | Status: ON HOLD | OUTPATIENT
Start: 2017-11-21 | End: 2018-02-09

## 2017-11-21 RX ORDER — AMLODIPINE BESYLATE 10 MG/1
10 TABLET ORAL DAILY
Qty: 90 TABLET | Refills: 1 | Status: ON HOLD | OUTPATIENT
Start: 2017-11-21 | End: 2018-02-09

## 2017-11-21 RX ORDER — ATORVASTATIN CALCIUM 80 MG/1
80 TABLET, FILM COATED ORAL DAILY
Qty: 90 TABLET | Refills: 1 | Status: ON HOLD | OUTPATIENT
Start: 2017-11-21 | End: 2018-02-09

## 2017-11-21 RX ORDER — OXYCODONE HYDROCHLORIDE 10 MG/1
TABLET ORAL
Qty: 70 TABLET | Refills: 0 | Status: SHIPPED | OUTPATIENT
Start: 2017-12-05 | End: 2017-12-18

## 2017-11-21 RX ORDER — DOXYCYCLINE HYCLATE 100 MG
100 TABLET ORAL 2 TIMES DAILY
Qty: 20 TABLET | Refills: 0 | Status: SHIPPED | OUTPATIENT
Start: 2017-11-21 | End: 2017-12-18

## 2017-11-21 RX ORDER — BUDESONIDE AND FORMOTEROL FUMARATE DIHYDRATE 160; 4.5 UG/1; UG/1
2 AEROSOL RESPIRATORY (INHALATION) 2 TIMES DAILY
Qty: 3 INHALER | Refills: 1 | Status: ON HOLD | OUTPATIENT
Start: 2017-11-21 | End: 2018-02-09

## 2017-11-21 RX ORDER — OXYCODONE HYDROCHLORIDE 10 MG/1
TABLET ORAL
Qty: 70 TABLET | Refills: 0 | Status: SHIPPED | OUTPATIENT
Start: 2017-11-21 | End: 2017-11-21

## 2017-11-21 NOTE — PATIENT INSTRUCTIONS
Plan:  1. Try to make an appointment with the ENT if the nose bleeding persists   2. Continue same meds, same doses for now   3. Follow up in a month

## 2017-11-21 NOTE — NURSING NOTE
"Chief Complaint   Patient presents with     RECHECK     Derm Problem       Initial /70 (BP Location: Right arm, Patient Position: Chair, Cuff Size: Adult Large)  Pulse 89  Temp 98.6  F (37  C) (Oral)  Ht 5' 4\" (1.626 m)  Wt 204 lb 12.8 oz (92.9 kg)  SpO2 98%  Breastfeeding? No  BMI 35.15 kg/m2 Estimated body mass index is 35.15 kg/(m^2) as calculated from the following:    Height as of this encounter: 5' 4\" (1.626 m).    Weight as of this encounter: 204 lb 12.8 oz (92.9 kg).  Medication Reconciliation: complete   Carolyn Villarreal CMA      "

## 2017-11-21 NOTE — PROGRESS NOTES
Dr Low's note      Patient's instructions / PLAN:                                                        Plan:  1. Try to make an appointment with the ENT if the nose bleeding persists   2. Continue same meds, same doses for now   3. Follow up in a month          ASSESSMENT & PLAN:                                                      (R07.89) Chest wall pain  Comment:   Plan: oxyCODONE IR (ROXICODONE) 10 MG tablet,         DISCONTINUED: oxyCODONE IR (ROXICODONE) 10 MG         tablet            (J44.9) Chr Lung Disease - managed by the pulm dept ([facility])^^^  Comment:   Plan: oxyCODONE IR (ROXICODONE) 10 MG tablet,         budesonide-formoterol (SYMBICORT) 160-4.5         MCG/ACT Inhaler, DISCONTINUED: oxyCODONE IR         (ROXICODONE) 10 MG tablet            (F11.20) Continuous opioid dependence ([facility]) opioid for chr cough and chest pain ^^^^  Comment:   Plan: oxyCODONE IR (ROXICODONE) 10 MG tablet,         DISCONTINUED: oxyCODONE IR (ROXICODONE) 10 MG         tablet            (I10) HTN, goal below 140/90  Comment:   Plan: amLODIPine (NORVASC) 10 MG tablet            (E78.5) Hyperlipidemia LDL goal <130  Comment:   Plan:     (F41.9) Anxiety  Comment:   Plan: escitalopram (LEXAPRO) 20 MG tablet            (R60.0) Bilateral leg edema  Comment:   Plan: furosemide (LASIX) 20 MG tablet            (J20.9) Acute bronchitis, unspecified organism  Comment:   Plan: doxycycline (VIBRA-TABS) 100 MG tablet               Chief complaint:                                                      Follow up chronic medical problems       SUBJECTIVE:   Swetha Patterson is a 47 year old female who presents to clinic today for the following health issues:        Abd pain   She met with surgeon Dr. Blanco, he told her she was obese and would not consider operating on her hiatal hernia until she lost weight. He referred her to another specialist to determine if she is having digestive issues as her cause for pain.     Chr neck pain   She met  "with Dr. Palomares (orthopedics) and he told her that he needed her to completely quit smoking before he will do surgery on her neck (says she has two bones pressing on her spine that gives her headaches). Says she has constant migraine. He said she will need to get a swab of her mouth done by PCP's office and have that sent to them.     *Derm Problem-Is breaking out in that same rash she had on her stomach before. She remembers she was given an antibiotic for this before that worked well to clear this up.        Review of Systems:                                                      ROS: negative for fever, chills, cough, wheezes, chest pain, shortness of breath, vomiting, , leg swelling pos for chr cough, pos for abd pain    A 10-point review of systems was obtained.  Those pertinent are above and in the in the Subjective section.  The rest of the systems are negative.           OBJECTIVE:             Physical exam:  Blood pressure 118/70, pulse 89, temperature 98.6  F (37  C), temperature source Oral, height 5' 4\" (1.626 m), weight 204 lb 12.8 oz (92.9 kg), SpO2 98 %, not currently breastfeeding.   NAD, appears comfortable  Skin: no rashes       PMHx: reviewed  Past Medical History:   Diagnosis Date     Anxiety state, unspecified      Asthma      Chronic pain     back pain from cyst     Contact dermatitis and other eczema, due to unspecified cause      COPD (chronic obstructive pulmonary disease) (H)      Depressive disorder, not elsewhere classified      Emphysema with chronic bronchitis (H)      Esophageal reflux      Family history of ischemic heart disease      Gastro-oesophageal reflux disease      History of emphysema      Hoarseness      HTN, goal below 140/90      Hyperlipidemia LDL goal <130      Liver disease     \"fatty liver\"     Other chronic pain     chest, from coughing     Pneumonia      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)      PONV (postoperative nausea and vomiting)       PSHx: " reviewed  Past Surgical History:   Procedure Laterality Date     ARTHROSCOPY SHOULDER DECOMPRESSION Left 10/21/2015    Procedure: ARTHROSCOPY SHOULDER DECOMPRESSION;  Surgeon: Julien Milian MD;  Location: [facility] OR     BRONCHIAL THERMOPLASTY N/A 11/14/2014    Procedure: BRONCHIAL THERMOPLASTY;  Surgeon: Ward Whitaker MD;  Location: [facility]     BRONCHIAL THERMOPLASTY N/A 12/19/2014    Procedure: BRONCHIAL THERMOPLASTY;  Surgeon: Ward Whitaker MD;  [facility]: [facility]     BRONCHIAL THERMOPLASTY N/A 2/6/2015    Procedure: BRONCHIAL THERMOPLASTY;  Surgeon: Ward Whitaker MD;  Location: [facility]     C APPENDECTOMY  at age 17     EXCISE NODE MEDIASTINAL  4/26/2013    Procedure: EXCISE NODE MEDIASTINAL;;  Surgeon: Av Peña MD;  Location: [facility] OR     LAPAROSCOPIC CHOLECYSTECTOMY N/A 10/19/2017    Procedure: LAPAROSCOPIC CHOLECYSTECTOMY;  LAPAROSCOPIC CHOLECYSTECTOMY;  Surgeon: Ney Jerry MD;  Location: [facility] OR     THORACOSCOPY  4/26/2013    Procedure: THORACOSCOPY;  LEFT VIDEO ASSISTED THORACOSCOPY, RESECTION OF POSTERIOR MEDIASTINAL MASS;  Surgeon: Av Peña MD;  Location:  OR     TONSILLECTOMY  as a kid     TONSILLECTOMY          Meds: reviewed  Current Outpatient Prescriptions   Medication Sig Dispense Refill     hydrOXYzine (ATARAX) 50 MG tablet Take 1 tablet (50 mg) by mouth 4 times daily as needed for anxiety Increased dose. For anxiety 120 tablet 0     oxyCODONE IR (ROXICODONE) 10 MG tablet 5 times daily as needed 70 tablet 0     diazepam (VALIUM) 10 MG tablet Take 1 tablet (10 mg) by mouth every 8 hours as needed for anxiety or sleep For sleep, anxiety, and muscle tension. 90 tablet 0     magnesium hydroxide (MILK OF MAGNESIA) 400 MG/5ML suspension Take 30 mLs by mouth every 4 hours as needed for constipation or heartburn 360 mL 1     tiZANidine (ZANAFLEX) 4 MG tablet TK 1 T PO  TID PRF MUSCLE SPASM  0     ondansetron (ZOFRAN) 8 MG tablet Take 0.5-1 tablets (4-8  mg) by mouth every 8 hours as needed for nausea 30 tablet 0     omeprazole (PRILOSEC) 20 MG CR capsule Take 1 capsule (20 mg) by mouth 2 times daily 30 capsule 0     varenicline (CHANTIX STARTING MONTH PAK) 0.5 MG X 11 & 1 MG X 42 tablet Take 0.5 mg tab daily for 3 days, then 0.5 mg tab twice daily for 4 days, then 1 mg twice daily. 53 tablet 0     escitalopram (LEXAPRO) 20 MG tablet Take 1 tablet (20 mg) by mouth daily Increased dose. 90 tablet 1     doxepin (SINEQUAN) 10 MG capsule Take 1 capsule (10 mg) by mouth At Bedtime 30 capsule 1     venlafaxine (EFFEXOR-XR) 37.5 MG 24 hr capsule Take 1 capsule daily for 14 days, then take 2 capsules daily. 46 capsule 0     fluticasone (FLONASE) 50 MCG/ACT spray Spray 2 sprays into both nostrils daily 16 g 11     ranitidine (ZANTAC) 75 MG tablet Take 2 tablets (150 mg) by mouth daily 90 tablet 1     cetirizine HCl 10 MG CAPS Take 1 capsule by mouth daily 90 capsule 3     atorvastatin (LIPITOR) 80 MG tablet Take 1 tablet (80 mg) by mouth daily 90 tablet 1     amLODIPine (NORVASC) 10 MG tablet Take 1 tablet (10 mg) by mouth daily 90 tablet 1     lisinopril (PRINIVIL/ZESTRIL) 20 MG tablet Take 1 tablet (20 mg) by mouth daily 90 tablet 1     montelukast (SINGULAIR) 10 MG tablet Take 1 tablet (10 mg) by mouth At Bedtime 30 tablet 1     furosemide (LASIX) 20 MG tablet Take 1 tablet (20 mg) by mouth daily as needed 90 tablet 1     NICORETTE 2 MG lozenge PLACE 1 LOZENGE INSIDE CHEEK Q HOUR PRF SMOKING CESSATION UTD  1     budesonide-formoterol (SYMBICORT) 160-4.5 MCG/ACT Inhaler Inhale 2 puffs into the lungs 2 times daily 3 Inhaler 3     ipratropium - albuterol 0.5 mg/2.5 mg/3 mL (DUONEB) 0.5-2.5 (3) MG/3ML neb solution Take 1 vial (3 mLs) by nebulization every 6 hours 360 mL 0     tiotropium (SPIRIVA HANDIHALER) 18 MCG capsule Inhale contents of one capsule daily. 30 capsule 1     albuterol (VENTOLIN HFA) 108 (90 BASE) MCG/ACT Inhaler Inhale 2 puffs into the lungs every 6 hours 3  Inhaler 3     desonide (DESOWEN) 0.05 % cream Apply sparingly to affected area three times daily as needed. 60 g 0     prochlorperazine (COMPAZINE) 5 MG tablet Take 1 tablet (5 mg) by mouth every 6 hours as needed for nausea or vomiting 90 tablet 0     order for DME Equipment being ordered: Nebulizer machine  Length of need-lifetime 1 Device 0     chlorhexidine (HIBICLENS) 4 % external liquid Wash ab, groin, thighs daily in shower DO NOT PUT ON FACE 946 mL 3     VITAMIN D, CHOLECALCIFEROL, PO Take 2,000 Units by mouth daily         Soc Hx: reviewed  Fam Hx: reviewed          Roro Low MD  Internal Medicine

## 2017-11-21 NOTE — MR AVS SNAPSHOT
After Visit Summary   11/21/2017    Swetha Patterson    MRN: [id number]           Patient Information     Date Of Birth          1970        Visit Information        Provider Department      11/21/2017 9:20 AM Roro Chawla MD [facility]        Today's Diagnoses     Chest wall pain        Chr Lung Disease - managed by the pulm dept ([facility])^^^        Continuous opioid dependence ([facility]) opioid for chr cough and chest pain ^^^^        HTN, goal below 140/90        Family history of ischemic heart disease        Hyperlipidemia LDL goal <130        Anxiety        Bilateral leg edema        Essential hypertension with goal blood pressure less than 140/90        Acute bronchitis, unspecified organism          Care Instructions    Plan:  1. Try to make an appointment with the ENT if the nose bleeding persists   2. Continue same meds, same doses for now   3. Follow up in a month          Follow-ups after your visit        Your next 10 appointments already scheduled     Dec 20, 2017  3:00 PM CST   (Arrive by 2:45 PM)   CT CHEST W/O CONTRAST with UCCT2   [facility] ([facility])    [address] [address] [phone]           Please bring any scans or X-rays taken at other hospitals, if similar tests were done. Also bring a list of your medicines, including vitamins, minerals and over-the-counter drugs. It is safest to leave personal items at home.  Be sure to tell your doctor:   If you have any allergies.   If there s any chance you are pregnant.   If you are breastfeeding.   If you have any special needs.  You do not need to do anything special to prepare.  Please wear loose clothing, such as a sweat suit or jogging clothes. Avoid snaps, zippers and other metal. We may ask you to undress and put on a hospital gown.            Dec 20, 2017  3:30 PM CST   FULL PULMONARY FUNCTION with  PFL CHICA CHAVEZ  "[facility] ([facility])    [address] [address] [phone]            Dec 20, 2017  4:30 PM CST   (Arrive by 4:15 PM)   Return Visit with Nita Williamson MD   [facility] ([facility])    [address] [id number] [phone]              Who to contact     If you have questions or need follow up information about today's clinic visit or your schedule please contact [facility] directly at [phone].  Normal or non-critical lab and imaging results will be communicated to you by MyChart, letter or phone within 4 business days after the clinic has received the results. If you do not hear from us within 7 days, please contact the clinic through MyChart or phone. If you have a critical or abnormal lab result, we will notify you by phone as soon as possible.  Submit refill requests through [employer] or call your pharmacy and they will forward the refill request to us. Please allow 3 business days for your refill to be completed.          Additional Information About Your Visit        [employer] Information     [employer] lets you send messages to your doctor, view your test results, renew your prescriptions, schedule appointments and more. To sign up, go to [email or web address] . Click on \"Log in\" on the left side of the screen, which will take you to the Welcome page. Then click on \"Sign up Now\" on the right side of the page.     You will be asked to enter the access code listed below, as well as some personal information. Please follow the directions to create your username and password.     Your access code is: [email or web address]  Expires: 2018  6:42 PM     Your access code will  in 90 days. If you need help or a new code, please call your [facility] or [phone].        Care EveryWhere ID     This is your Care " "EveryWhere ID. This could be used by other organizations to access your [facility] medical records  [id number]        Your Vitals Were     Pulse Temperature Height Pulse Oximetry Breastfeeding? BMI (Body Mass Index)    89 98.6  F (37  C) (Oral) 5' 4\" (1.626 m) 98% No 35.15 kg/m2       Blood Pressure from Last 3 Encounters:   11/21/17 118/70   11/08/17 134/80   11/07/17 130/88    Weight from Last 3 Encounters:   11/21/17 204 lb 12.8 oz (92.9 kg)   11/09/17 203 lb (92.1 kg)   11/08/17 202 lb (91.6 kg)              Today, you had the following     No orders found for display         Today's Medication Changes          These changes are accurate as of: 11/21/17  9:27 AM.  If you have any questions, ask your nurse or doctor.               Start taking these medicines.        Dose/Directions    doxycycline 100 MG tablet   Commonly known as:  VIBRA-TABS   Used for:  Acute bronchitis, unspecified organism        Dose:  100 mg   Take 1 tablet (100 mg) by mouth 2 times daily   Quantity:  20 tablet   Refills:  0       oxyCODONE IR 10 MG tablet   Commonly known as:  ROXICODONE   Used for:  Chest wall pain, Severe chronic obstructive pulmonary disease (H), Continuous opioid dependence (H)        Start taking on:  12/5/2017   5 times daily as needed   Quantity:  70 tablet   Refills:  0            Where to get your medicines      These medications were sent to [facility] [place], [place] - [facility] [address]  [facility] [address] [facility] [address]     Phone:  [phone]     amLODIPine 10 MG tablet    atorvastatin 80 MG tablet    budesonide-formoterol 160-4.5 MCG/ACT Inhaler    doxycycline 100 MG tablet    escitalopram 20 MG tablet    furosemide 20 MG tablet    lisinopril 20 MG tablet         Some of these will need a paper prescription and others can be bought over the counter.  Ask your nurse if you have questions.     Bring a paper prescription for each of these medications     oxyCODONE IR 10 MG tablet "                Primary Care Provider Office Phone # Fax #    Roro Chawla [phone]       [address] [facility] [address]        Equal Access to Services     CHARITY MANDUJANO : Hadii skinny ku hadjocelyneo Soomaali, waaxda luqadaha, qaybta kaalmada adeegyada, caryl chavez laAngiebebo saunders. So [facility] [phone].    ATENCIÓN: Si habla español, tiene a vargas disposición servicios gratuitos de asistencia lingüística. Llame al [phone].    We comply with applicable federal civil rights laws and [place] laws. We do not discriminate on the basis of race, color, national origin, age, disability, sex, sexual orientation, or gender identity.            Thank you!     Thank you for choosing [facility]  for your care. Our goal is always to provide you with excellent care. Hearing back from our patients is one way we can continue to improve our services. Please take a few minutes to complete the written survey that you may receive in the mail after your visit with us. Thank you!             Your Updated Medication List - Protect others around you: Learn how to safely use, store and throw away your medicines at www.disposemymeds.org.          This list is accurate as of: 11/21/17  9:27 AM.  Always use your most recent med list.                   Brand Name Dispense Instructions for use Diagnosis    albuterol 108 (90 BASE) MCG/ACT Inhaler    VENTOLIN HFA    3 Inhaler    Inhale 2 puffs into the lungs every 6 hours    Shortness of breath       amLODIPine 10 MG tablet    NORVASC    90 tablet    Take 1 tablet (10 mg) by mouth daily    HTN, goal below 140/90       atorvastatin 80 MG tablet    LIPITOR    90 tablet    Take 1 tablet (80 mg) by mouth daily    Family history of ischemic heart disease, Hyperlipidemia LDL goal <130       budesonide-formoterol 160-4.5 MCG/ACT Inhaler    SYMBICORT    3 Inhaler    Inhale 2 puffs into the lungs 2 times daily    Severe chronic  obstructive pulmonary disease (H)       cetirizine HCl 10 MG Caps     90 capsule    Take 1 capsule by mouth daily    Chronic rhinitis       chlorhexidine 4 % liquid    HIBICLENS    946 mL    Wash ab, groin, thighs daily in shower DO NOT PUT ON FACE    Hidradenitis suppurativa       desonide 0.05 % cream    DESOWEN    60 g    Apply sparingly to affected area three times daily as needed.    Localized pruritus       diazepam 10 MG tablet    VALIUM    90 tablet    Take 1 tablet (10 mg) by mouth every 8 hours as needed for anxiety or sleep For sleep, anxiety, and muscle tension.    Chest wall pain, Muscle pain, Anxiety       doxepin 10 MG capsule    SINEquan    30 capsule    Take 1 capsule (10 mg) by mouth At Bedtime    Persistent insomnia       doxycycline 100 MG tablet    VIBRA-TABS    20 tablet    Take 1 tablet (100 mg) by mouth 2 times daily    Acute bronchitis, unspecified organism       escitalopram 20 MG tablet    LEXAPRO    90 tablet    Take 1 tablet (20 mg) by mouth daily Increased dose.    Anxiety       fluticasone 50 MCG/ACT spray    FLONASE    16 g    Spray 2 sprays into both nostrils daily    Severe chronic obstructive pulmonary disease (H)       furosemide 20 MG tablet    LASIX    90 tablet    Take 1 tablet (20 mg) by mouth daily as needed    Bilateral leg edema       hydrOXYzine 50 MG tablet    ATARAX    120 tablet    Take 1 tablet (50 mg) by mouth 4 times daily as needed for anxiety Increased dose. For anxiety    Anxiety       ipratropium - albuterol 0.5 mg/2.5 mg/3 mL 0.5-2.5 (3) MG/3ML neb solution    DUONEB    360 mL    Take 1 vial (3 mLs) by nebulization every 6 hours    Uncomplicated asthma, unspecified asthma severity       lisinopril 20 MG tablet    PRINIVIL/ZESTRIL    90 tablet    Take 1 tablet (20 mg) by mouth daily    Essential hypertension with goal blood pressure less than 140/90       magnesium hydroxide 400 MG/5ML suspension    MILK OF MAGNESIA    360 mL    Take 30 mLs by mouth every 4 hours  as needed for constipation or heartburn        montelukast 10 MG tablet    SINGULAIR    30 tablet    Take 1 tablet (10 mg) by mouth At Bedtime    Dust allergy, Severe chronic obstructive pulmonary disease (H)       NICORETTE 2 MG lozenge   Generic drug:  nicotine polacrilex      PLACE 1 LOZENGE INSIDE CHEEK Q HOUR PRF SMOKING CESSATION UTD        omeprazole 20 MG CR capsule    priLOSEC    30 capsule    Take 1 capsule (20 mg) by mouth 2 times daily    Acute gastritis, presence of bleeding unspecified, unspecified gastritis type       ondansetron 8 MG tablet    ZOFRAN    30 tablet    Take 0.5-1 tablets (4-8 mg) by mouth every 8 hours as needed for nausea    Nausea       order for DME     1 Device    Equipment being ordered: Nebulizer machine Length of need-lifetime    Chronic lung disease, Cough, Severe persistent asthma with acute exacerbation       oxyCODONE IR 10 MG tablet   Start taking on:  12/5/2017    ROXICODONE    70 tablet    5 times daily as needed    Chest wall pain, Severe chronic obstructive pulmonary disease (H), Continuous opioid dependence (H)       prochlorperazine 5 MG tablet    COMPAZINE    90 tablet    Take 1 tablet (5 mg) by mouth every 6 hours as needed for nausea or vomiting    Nausea       ranitidine 75 MG tablet    ZANTAC    90 tablet    Take 2 tablets (150 mg) by mouth daily    Uncomplicated asthma, unspecified asthma severity       tiotropium 18 MCG capsule    SPIRIVA HANDIHALER    30 capsule    Inhale contents of one capsule daily.    Other emphysema (H)       tiZANidine 4 MG tablet    ZANAFLEX     TK 1 T PO  TID PRF MUSCLE SPASM        varenicline 0.5 MG X 11 & 1 MG X 42 tablet    CHANTIX STARTING MONTH PAK    53 tablet    Take 0.5 mg tab daily for 3 days, then 0.5 mg tab twice daily for 4 days, then 1 mg twice daily.    Encounter for smoking cessation counseling       venlafaxine 37.5 MG 24 hr capsule    EFFEXOR-XR    46 capsule    Take 1 capsule daily for 14 days, then take 2 capsules  daily.    Menopausal syndrome (hot flashes)       VITAMIN D (CHOLECALCIFEROL) PO      Take 2,000 Units by mouth daily

## 2017-11-28 DIAGNOSIS — R07.89 CHEST WALL PAIN: ICD-10-CM

## 2017-11-28 DIAGNOSIS — M79.10 MUSCLE PAIN: ICD-10-CM

## 2017-11-28 DIAGNOSIS — F41.9 ANXIETY: ICD-10-CM

## 2017-11-28 RX ORDER — DIAZEPAM 10 MG
10 TABLET ORAL EVERY 8 HOURS PRN
Qty: 90 TABLET | Refills: 0 | Status: SHIPPED | OUTPATIENT
Start: 2017-11-28 | End: 2017-12-18

## 2017-11-28 NOTE — TELEPHONE ENCOUNTER
Called pt, vm greeting has business name, but is correct phone number so left generic vm to call clinic back.    Rx faxed to [facility].

## 2017-11-28 NOTE — TELEPHONE ENCOUNTER
Pt left message on  11-27-17 @ 3:50p.  Needs refill on:    Diazepam      Last Written Prescription Date:  10-24-17  Last Fill Quantity: 90,   # refills: 0  Last Office Visit: 11-21-17  Future Office visit:    Next 5 appointments (look out 90 days)     Dec 18, 2017 10:20 AM CST   SHORT with Roro Chawla MD   [facility] ([facility])    [address]  [facility] [id number] [phone]                   Routing refill request to provider for review/approval because:  Drug not on the [facility], [facility] or  Health refill protocol or controlled substance    Signed CSA form in chart.    Please advise, thanks.

## 2017-11-30 ENCOUNTER — TELEPHONE (OUTPATIENT)
Dept: INTERNAL MEDICINE | Facility: CLINIC | Age: 47
End: 2017-11-30

## 2017-11-30 DIAGNOSIS — L73.2 HYDRADENITIS: ICD-10-CM

## 2017-11-30 DIAGNOSIS — B37.31 CANDIDIASIS OF VULVA AND VAGINA: Primary | ICD-10-CM

## 2017-11-30 NOTE — TELEPHONE ENCOUNTER
"1.  Patient uses Doxycycline for her skin, states she gets boil-like lumps on buttocks, groin and abdomen.  Lately they seem to show up every couple of weeks.  States when ever she uses the med she ends up with a vaginal yeast infection and wants ongoing rx for Diflucan, 1 tab x1 dose with refills.  2.  Wants rx for prescription strength hydrocortisone cream to use on the boils or abcesses as it seems to help them clear up faster, \"dries them out\".   SERJIO Chandra R.N.    "

## 2017-12-02 RX ORDER — FLUCONAZOLE 150 MG/1
150 TABLET ORAL
Qty: 2 TABLET | Refills: 0 | Status: SHIPPED | OUTPATIENT
Start: 2017-12-02 | End: 2017-12-18

## 2017-12-02 NOTE — TELEPHONE ENCOUNTER
Diflucan Rx done  Cortisone will not help the boils  She discussed to see dermatology.  Please make dermatology referral

## 2017-12-07 ENCOUNTER — TELEPHONE (OUTPATIENT)
Dept: INTERNAL MEDICINE | Facility: CLINIC | Age: 47
End: 2017-12-07

## 2017-12-07 DIAGNOSIS — J44.9 SEVERE CHRONIC OBSTRUCTIVE PULMONARY DISEASE (H): Primary | Chronic | ICD-10-CM

## 2017-12-07 NOTE — TELEPHONE ENCOUNTER
PT states she has another URI starting. Since yesterday. She is coughing a lot over the phone. Sob, tight chest. Using her nebs, meds.     She is asking for Prednisone, and antibiotics. Or OV needed?     Please advise.

## 2017-12-08 RX ORDER — PREDNISONE 20 MG/1
40 TABLET ORAL DAILY
Qty: 10 TABLET | Refills: 0 | Status: SHIPPED | OUTPATIENT
Start: 2017-12-08 | End: 2017-12-13

## 2017-12-08 NOTE — TELEPHONE ENCOUNTER
"Left message on patient's voicemail that \"a prescription has been sent to the pharmacy\".  SERJIO Chandra R.N.    "

## 2017-12-12 DIAGNOSIS — F41.9 ANXIETY: ICD-10-CM

## 2017-12-12 RX ORDER — HYDROXYZINE HYDROCHLORIDE 50 MG/1
50 TABLET, FILM COATED ORAL 4 TIMES DAILY PRN
Qty: 120 TABLET | Refills: 0 | Status: SHIPPED | OUTPATIENT
Start: 2017-12-12 | End: 2018-01-15

## 2017-12-12 NOTE — TELEPHONE ENCOUNTER
Call to pt. Updated. States Dermatology is who ordered the Doxycycline. States the areas on her abdomen are not healing. Advised f/u with dermatology.

## 2017-12-12 NOTE — TELEPHONE ENCOUNTER
Called pt re: another encounter. Requested refill for:    Requested Prescriptions   Pending Prescriptions Disp Refills     hydrOXYzine (ATARAX) 50 MG tablet 120 tablet 0     Sig: Take 1 tablet (50 mg) by mouth 4 times daily as needed for anxiety Increased dose. For anxiety    Antihistamines Protocol Passed    12/12/2017 11:25 AM       Passed - Patient is 3-64 years of age    Apply age and/or weight-based dosing for peds patients age 3 and older.    Forward request to provider for patients under the age of 3 or over the age of 64.         Passed - Recent or future visit with authorizing provider's specialty    Patient had office visit in the last year or has a visit in the next 30 days with authorizing provider.  See chart review.             Prescription approved per [facility] Refill Protocol.

## 2017-12-18 ENCOUNTER — OFFICE VISIT (OUTPATIENT)
Dept: INTERNAL MEDICINE | Facility: CLINIC | Age: 47
End: 2017-12-18
Payer: MEDICARE

## 2017-12-18 ENCOUNTER — TELEPHONE (OUTPATIENT)
Dept: INTERNAL MEDICINE | Facility: CLINIC | Age: 47
End: 2017-12-18

## 2017-12-18 VITALS
BODY MASS INDEX: 35.36 KG/M2 | WEIGHT: 207.1 LBS | HEIGHT: 64 IN | OXYGEN SATURATION: 98 % | TEMPERATURE: 98.6 F | HEART RATE: 96 BPM | SYSTOLIC BLOOD PRESSURE: 120 MMHG | DIASTOLIC BLOOD PRESSURE: 80 MMHG

## 2017-12-18 DIAGNOSIS — I10 HTN, GOAL BELOW 140/90: Chronic | ICD-10-CM

## 2017-12-18 DIAGNOSIS — F41.9 ANXIETY: ICD-10-CM

## 2017-12-18 DIAGNOSIS — R11.0 NAUSEA: ICD-10-CM

## 2017-12-18 DIAGNOSIS — F11.20 CONTINUOUS OPIOID DEPENDENCE (H): Chronic | ICD-10-CM

## 2017-12-18 DIAGNOSIS — M79.10 MUSCLE PAIN: ICD-10-CM

## 2017-12-18 DIAGNOSIS — B37.31 CANDIDIASIS OF VULVA AND VAGINA: ICD-10-CM

## 2017-12-18 DIAGNOSIS — R07.89 CHEST WALL PAIN: ICD-10-CM

## 2017-12-18 DIAGNOSIS — J44.9 SEVERE CHRONIC OBSTRUCTIVE PULMONARY DISEASE (H): Chronic | ICD-10-CM

## 2017-12-18 DIAGNOSIS — J20.9 ACUTE BRONCHITIS, UNSPECIFIED ORGANISM: ICD-10-CM

## 2017-12-18 PROCEDURE — 99214 OFFICE O/P EST MOD 30 MIN: CPT | Performed by: INTERNAL MEDICINE

## 2017-12-18 RX ORDER — DIAZEPAM 10 MG
10 TABLET ORAL EVERY 8 HOURS PRN
Qty: 180 TABLET | Refills: 0 | Status: ON HOLD | OUTPATIENT
Start: 2017-12-18 | End: 2018-02-09

## 2017-12-18 RX ORDER — DOXYCYCLINE HYCLATE 100 MG
100 TABLET ORAL 2 TIMES DAILY
Qty: 20 TABLET | Refills: 0 | Status: ON HOLD | COMMUNITY
Start: 2017-12-18 | End: 2018-02-09

## 2017-12-18 RX ORDER — OXYCODONE HYDROCHLORIDE 10 MG/1
TABLET ORAL
Qty: 280 TABLET | Refills: 0 | Status: ON HOLD | OUTPATIENT
Start: 2017-12-18 | End: 2018-02-09

## 2017-12-18 RX ORDER — ONDANSETRON 8 MG/1
4-8 TABLET, FILM COATED ORAL EVERY 8 HOURS PRN
Qty: 30 TABLET | Refills: 0 | Status: ON HOLD | OUTPATIENT
Start: 2017-12-18 | End: 2018-02-09

## 2017-12-18 RX ORDER — AMLODIPINE BESYLATE 10 MG/1
10 TABLET ORAL DAILY
Qty: 30 TABLET | Refills: 1 | Status: ON HOLD | OUTPATIENT
Start: 2017-12-18 | End: 2018-02-08

## 2017-12-18 RX ORDER — ATORVASTATIN CALCIUM 80 MG/1
80 TABLET, FILM COATED ORAL DAILY
Qty: 30 TABLET | Refills: 1 | Status: ON HOLD | OUTPATIENT
Start: 2017-12-18 | End: 2018-02-08

## 2017-12-18 RX ORDER — FLUCONAZOLE 150 MG/1
150 TABLET ORAL
Qty: 2 TABLET | Refills: 0 | Status: ON HOLD | COMMUNITY
Start: 2017-12-18 | End: 2018-02-09

## 2017-12-18 RX ORDER — ESCITALOPRAM OXALATE 20 MG/1
20 TABLET ORAL DAILY
Qty: 30 TABLET | Refills: 1 | Status: ON HOLD | OUTPATIENT
Start: 2017-12-18 | End: 2018-02-08

## 2017-12-18 NOTE — LETTER
[facility]  [address], [address]  [place] [address]  [phone]        December 18, 2017    Swetha Patterson  [address]  [facility] [address]            To Whom It May Concern:      Ms. Swetha Patterson has been my patient for 4 years. She has chronic cough, she had extensive work up at [facility] and [facility].   She does not have a contagious disease/cough.     Sincerely,     Roro Low MD  Internal Medicine

## 2017-12-18 NOTE — PROGRESS NOTES
ASSESSMENT & PLAN:                                                      (F11.20) Continuous opioid dependence ([facility]) opioid for chr cough and chest pain ^^^^  Comment:   Plan: oxyCODONE IR (ROXICODONE) 10 MG tablet            (J44.9) Chr Lung Disease - managed by the pulm dept ([facility])^^^  Comment:   Plan: oxyCODONE IR (ROXICODONE) 10 MG tablet            (B37.3) Candidiasis of vulva and vagina  Comment:   Plan: fluconazole (DIFLUCAN) 150 MG tablet            (J20.9) Acute bronchitis, unspecified organism  Comment:   Plan: doxycycline (VIBRA-TABS) 100 MG tablet            (I10) HTN, goal below 140/90  Comment:   Plan: amLODIPine (NORVASC) 10 MG tablet, atorvastatin        (LIPITOR) 80 MG tablet            (F41.9) Anxiety  Comment:   Plan: escitalopram (LEXAPRO) 20 MG tablet, diazepam         (VALIUM) 10 MG tablet            (R07.89) Chest wall pain  Comment:   Plan: oxyCODONE IR (ROXICODONE) 10 MG tablet,         diazepam (VALIUM) 10 MG tablet            (M79.1) Muscle pain  Comment:   Plan: diazepam (VALIUM) 10 MG tablet               Chief Complaint:                                                      Follow up chronic medical problems        SUBJECTIVE:   Swetha Patterson is a 47 year old female who presents to clinic today for the following health issues:    She has chr cough, part of COPD, part of laryngeal nodules. She has been on Chr Oxycodone 5 tabs a day for few years now to help with the cough and the pain sec it. I checked her name multiple times in [facility] and she receives meds only from this clinic.  I talked to the pharmacy down stairs where she usually gets her Oxy. They don't have enough in stock for her Rx. I talked to the special[facility], they have the number in stock. She will go there.     She feels better in winter in [place]. She is leaving tomorrow and coming back  In FEB. Needs meds refill including Oxy. She is aware she needs to guard them and they will not be replaced if lost.  "        Smoking : quit 2 weeks ago , has dreams with Chantix but she is determined to quit smoking.     I wrote letters in the past that her cough is not contagious. She would like a new one. - done          Review of Systems:         ROS: negative for fever, chills, cough, wheezes, chest pain, shortness of breath, vomiting, , leg swelling pos for chr cough, pos for abd pain     A 10-point review of systems was obtained.  Those pertinent are above and in the in the Subjective section.  The rest of the systems are negative.            OBJECTIVE:                                                    Physical Exam :    Blood pressure 120/80, pulse 96, temperature 98.6  F (37  C), temperature source Oral, height 5' 4\" (1.626 m), weight 207 lb 1.6 oz (93.9 kg), SpO2 98 %, not currently breastfeeding.   NAD, appears comfortable  Skin: no rashes   Chest: clear to auscultation bilaterally, good respiratory effort. freq cough   Heart: S1 S2, RRR, no mgr appreciated  Abdomen: soft, not tender,  Extremities: no edema,   Neurologic: A, Ox3, no focal signs appreciated    PMHx: reviewed  Past Medical History:   Diagnosis Date     Anxiety state, unspecified      Asthma      Chronic pain     back pain from cyst     Contact dermatitis and other eczema, due to unspecified cause      COPD (chronic obstructive pulmonary disease) (H)      Depressive disorder, not elsewhere classified      Emphysema with chronic bronchitis (H)      Esophageal reflux      Family history of ischemic heart disease      Gastro-oesophageal reflux disease      History of emphysema      Hoarseness      HTN, goal below 140/90      Hyperlipidemia LDL goal <130      Liver disease     \"fatty liver\"     Other chronic pain     chest, from coughing     Pneumonia      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)      PONV (postoperative nausea and vomiting)       PSHx: reviewed  Past Surgical History:   Procedure Laterality Date     ARTHROSCOPY SHOULDER DECOMPRESSION Left " 10/21/2015    Procedure: ARTHROSCOPY SHOULDER DECOMPRESSION;  Surgeon: Julien Milian MD;  Location: [facility] OR     BRONCHIAL THERMOPLASTY N/A 11/14/2014    Procedure: BRONCHIAL THERMOPLASTY;  Surgeon: Ward Whitaker MD;  Location: [facility]     BRONCHIAL THERMOPLASTY N/A 12/19/2014    Procedure: BRONCHIAL THERMOPLASTY;  Surgeon: Ward Whitaker MD;  Location: [facility]     BRONCHIAL THERMOPLASTY N/A 2/6/2015    Procedure: BRONCHIAL THERMOPLASTY;  Surgeon: Ward Whitaker MD;  Location: [facility]     C APPENDECTOMY  at age 17     EXCISE NODE MEDIASTINAL  4/26/2013    Procedure: EXCISE NODE MEDIASTINAL;;  Surgeon: Av Peña MD;  Location: [facility] OR     LAPAROSCOPIC CHOLECYSTECTOMY N/A 10/19/2017    Procedure: LAPAROSCOPIC CHOLECYSTECTOMY;  LAPAROSCOPIC CHOLECYSTECTOMY;  Surgeon: Ney Jerry MD;  Location: [facility] OR     THORACOSCOPY  4/26/2013    Procedure: THORACOSCOPY;  LEFT VIDEO ASSISTED THORACOSCOPY, RESECTION OF POSTERIOR MEDIASTINAL MASS;  Surgeon: Av Peña MD;  Location: [facility] OR     TONSILLECTOMY  as a kid     TONSILLECTOMY          Meds: reviewed  Current Outpatient Prescriptions   Medication Sig Dispense Refill     fluconazole (DIFLUCAN) 150 MG tablet Take 1 tablet (150 mg) by mouth every 3 days Has this on hand to use PRN for rash flare-up 2 tablet 0     doxycycline (VIBRA-TABS) 100 MG tablet Take 1 tablet (100 mg) by mouth 2 times daily Has these on hand to use PRN for rash flare-up 20 tablet 0     amLODIPine (NORVASC) 10 MG tablet Take 1 tablet (10 mg) by mouth daily 30 tablet 1     atorvastatin (LIPITOR) 80 MG tablet Take 1 tablet (80 mg) by mouth daily 30 tablet 1     escitalopram (LEXAPRO) 20 MG tablet Take 1 tablet (20 mg) by mouth daily 30 tablet 1     oxyCODONE IR (ROXICODONE) 10 MG tablet 5 times daily as needed 280 tablet 0     diazepam (VALIUM) 10 MG tablet Take 1 tablet (10 mg) by mouth every 8 hours as needed for anxiety or sleep For sleep, anxiety, and  muscle tension. 180 tablet 0     hydrOXYzine (ATARAX) 50 MG tablet Take 1 tablet (50 mg) by mouth 4 times daily as needed for anxiety Increased dose. For anxiety 120 tablet 0     amLODIPine (NORVASC) 10 MG tablet Take 1 tablet (10 mg) by mouth daily 90 tablet 1     atorvastatin (LIPITOR) 80 MG tablet Take 1 tablet (80 mg) by mouth daily 90 tablet 1     budesonide-formoterol (SYMBICORT) 160-4.5 MCG/ACT Inhaler Inhale 2 puffs into the lungs 2 times daily 3 Inhaler 1     escitalopram (LEXAPRO) 20 MG tablet Take 1 tablet (20 mg) by mouth daily Increased dose. 90 tablet 1     furosemide (LASIX) 20 MG tablet Take 1 tablet (20 mg) by mouth daily as needed 90 tablet 1     lisinopril (PRINIVIL/ZESTRIL) 20 MG tablet Take 1 tablet (20 mg) by mouth daily 90 tablet 1     magnesium hydroxide (MILK OF MAGNESIA) 400 MG/5ML suspension Take 30 mLs by mouth every 4 hours as needed for constipation or heartburn 360 mL 1     tiZANidine (ZANAFLEX) 4 MG tablet TK 1 T PO  TID PRF MUSCLE SPASM  0     omeprazole (PRILOSEC) 20 MG CR capsule Take 1 capsule (20 mg) by mouth 2 times daily 30 capsule 0     varenicline (CHANTIX STARTING MONTH PAK) 0.5 MG X 11 & 1 MG X 42 tablet Take 0.5 mg tab daily for 3 days, then 0.5 mg tab twice daily for 4 days, then 1 mg twice daily. 53 tablet 0     doxepin (SINEQUAN) 10 MG capsule Take 1 capsule (10 mg) by mouth At Bedtime 30 capsule 1     venlafaxine (EFFEXOR-XR) 37.5 MG 24 hr capsule Take 1 capsule daily for 14 days, then take 2 capsules daily. 46 capsule 0     fluticasone (FLONASE) 50 MCG/ACT spray Spray 2 sprays into both nostrils daily 16 g 11     ranitidine (ZANTAC) 75 MG tablet Take 2 tablets (150 mg) by mouth daily 90 tablet 1     cetirizine HCl 10 MG CAPS Take 1 capsule by mouth daily 90 capsule 3     montelukast (SINGULAIR) 10 MG tablet Take 1 tablet (10 mg) by mouth At Bedtime 30 tablet 1     NICORETTE 2 MG lozenge PLACE 1 LOZENGE INSIDE CHEEK Q HOUR PRF SMOKING CESSATION UTD  1     ipratropium -  albuterol 0.5 mg/2.5 mg/3 mL (DUONEB) 0.5-2.5 (3) MG/3ML neb solution Take 1 vial (3 mLs) by nebulization every 6 hours 360 mL 0     tiotropium (SPIRIVA HANDIHALER) 18 MCG capsule Inhale contents of one capsule daily. 30 capsule 1     albuterol (VENTOLIN HFA) 108 (90 BASE) MCG/ACT Inhaler Inhale 2 puffs into the lungs every 6 hours 3 Inhaler 3     desonide (DESOWEN) 0.05 % cream Apply sparingly to affected area three times daily as needed. 60 g 0     prochlorperazine (COMPAZINE) 5 MG tablet Take 1 tablet (5 mg) by mouth every 6 hours as needed for nausea or vomiting 90 tablet 0     order for DME Equipment being ordered: Nebulizer machine  Length of need-lifetime 1 Device 0     chlorhexidine (HIBICLENS) 4 % external liquid Wash ab, groin, thighs daily in shower DO NOT PUT ON FACE 946 mL 3     VITAMIN D, CHOLECALCIFEROL, PO Take 2,000 Units by mouth daily       ondansetron (ZOFRAN) 8 MG tablet Take 0.5-1 tablets (4-8 mg) by mouth every 8 hours as needed for nausea 30 tablet 0       Soc Hx: reviewed  Fam Hx: reviewed          Roro Low MD  Internal Medicine

## 2017-12-18 NOTE — TELEPHONE ENCOUNTER
[facility]    RN, may resend Zofran prescription to a different pharmacy if the patient cannot make it to [facility] pharmacy

## 2017-12-18 NOTE — MR AVS SNAPSHOT
After Visit Summary   12/18/2017    Swetha Patterson    MRN: [id number]           Patient Information     Date Of Birth          1970        Visit Information        Provider Department      12/18/2017 10:20 AM Roro Chawla MD [facility]        Today's Diagnoses     Continuous opioid dependence (HCC) opioid for chr cough and chest pain ^^^^        Chr Lung Disease - managed by the pulm dept ([facility])^^^        Candidiasis of vulva and vagina        Acute bronchitis, unspecified organism        HTN, goal below 140/90        Anxiety        Chest wall pain        Muscle pain           Follow-ups after your visit        Your next 10 appointments already scheduled     Dec 28, 2017  2:00 PM CST   (Arrive by 1:45 PM)   CT CHEST W/O CONTRAST with CT2   [facility] ([facility])    [address] [place] [id number] [phone]           Please bring any scans or X-rays taken at other hospitals, if similar tests were done. Also bring a list of your medicines, including vitamins, minerals and over-the-counter drugs. It is safest to leave personal items at home.  Be sure to tell your doctor:   If you have any allergies.   If there s any chance you are pregnant.   If you are breastfeeding.   If you have any special needs.  You do not need to do anything special to prepare.  Please wear loose clothing, such as a sweat suit or jogging clothes. Avoid snaps, zippers and other metal. We may ask you to undress and put on a hospital gown.            Dec 28, 2017  2:30 PM CST   FULL PULMONARY FUNCTION with  PFL [facility] ([facility])    [address] [place] [id number] [phone]            Dec 28, 2017  3:30 PM CST   (Arrive by 3:15 PM)   Return Visit with Carol Jones MD   [facility] ([facility]  "[facility])    [address] [place] [id number]   [phone]            Feb 19, 2018 10:40 AM CST   SHORT with Roro Chawla MD   [facility]    [address]  [facility] [address]   [phone]            Mar 14, 2018 12:30 PM CDT   FULL PULMONARY FUNCTION with  PFL B   [facility] ([facility])    [address] [place] [id number] [phone]            Mar 14, 2018  1:30 PM CDT   (Arrive by 1:15 PM)   Return Visit with Nita Williamson MD   [facility] ([facility])    [address] [place] [id number] [phone]              Who to contact     If you have questions or need follow up information about today's clinic visit or your schedule please contact [facility] directly at [phone].  Normal or non-critical lab and imaging results will be communicated to you by MyChart, letter or phone within 4 business days after the clinic has received the results. If you do not hear from us within 7 days, please contact the clinic through [employer] or phone. If you have a critical or abnormal lab result, we will notify you by phone as soon as possible.  Submit refill requests through [employer] or call your pharmacy and they will forward the refill request to us. Please allow 3 business days for your refill to be completed.          Additional Information About Your Visit        MyChart Information     [employer] lets you send messages to your doctor, view your test results, renew your prescriptions, schedule appointments and more. To sign up, go to [email or web address] . Click on \"Log in\" on the left side of the screen, which will take you to the Welcome page. Then click on \"Sign up Now\" on the right side of the page.     You will be " "asked to enter the access code listed below, as well as some personal information. Please follow the directions to create your username and password.     Your access code is: [email or web address]  Expires: 2018  6:42 PM     Your access code will  in 90 days. If you need help or a new code, please call your [facility] clinic or [phone].        Care EveryWhere ID     This is your Care EveryWhere ID. This could be used by other organizations to access your [facility] medical records  [id number]        Your Vitals Were     Pulse Temperature Height Pulse Oximetry Breastfeeding? BMI (Body Mass Index)    96 98.6  F (37  C) (Oral) 5' 4\" (1.626 m) 98% No 35.55 kg/m2       Blood Pressure from Last 3 Encounters:   17 120/80   17 118/70   17 134/80    Weight from Last 3 Encounters:   17 207 lb 1.6 oz (93.9 kg)   17 204 lb 12.8 oz (92.9 kg)   17 203 lb (92.1 kg)              Today, you had the following     No orders found for display         Today's Medication Changes          These changes are accurate as of: 17 11:59 PM.  If you have any questions, ask your nurse or doctor.               These medicines have changed or have updated prescriptions.        Dose/Directions    * amLODIPine 10 MG tablet   Commonly known as:  NORVASC   This may have changed:  Another medication with the same name was added. Make sure you understand how and when to take each.   Used for:  HTN, goal below 140/90   Changed by:  Roro Chawla MD        Dose:  10 mg   Take 1 tablet (10 mg) by mouth daily   Quantity:  90 tablet   Refills:  1       * amLODIPine 10 MG tablet   Commonly known as:  NORVASC   This may have changed:  You were already taking a medication with the same name, and this prescription was added. Make sure you understand how and when to take each.   Used for:  HTN, goal below 140/90   Changed by:  Roro Chawla MD        Dose:  10 mg   Take 1 tablet (10 " mg) by mouth daily   Quantity:  30 tablet   Refills:  1       * atorvastatin 80 MG tablet   Commonly known as:  LIPITOR   This may have changed:  Another medication with the same name was added. Make sure you understand how and when to take each.   Changed by:  Roro Chawla MD        Dose:  80 mg   Take 1 tablet (80 mg) by mouth daily   Quantity:  90 tablet   Refills:  1       * atorvastatin 80 MG tablet   Commonly known as:  LIPITOR   This may have changed:  You were already taking a medication with the same name, and this prescription was added. Make sure you understand how and when to take each.   Used for:  HTN, goal below 140/90   Changed by:  Roro Chawla MD        Dose:  80 mg   Take 1 tablet (80 mg) by mouth daily   Quantity:  30 tablet   Refills:  1       DIFLUCAN 150 MG tablet   This may have changed:  additional instructions   Used for:  Candidiasis of vulva and vagina   Generic drug:  fluconazole   Changed by:  Roro Chawla MD        Dose:  150 mg   Take 1 tablet (150 mg) by mouth every 3 days Has this on hand to use PRN for rash flare-up   Quantity:  2 tablet   Refills:  0       doxycycline 100 MG tablet   Commonly known as:  VIBRA-TABS   This may have changed:  additional instructions   Used for:  Acute bronchitis, unspecified organism   Changed by:  Roro Chawla MD        Dose:  100 mg   Take 1 tablet (100 mg) by mouth 2 times daily Has these on hand to use PRN for rash flare-up   Quantity:  20 tablet   Refills:  0       * escitalopram 20 MG tablet   Commonly known as:  LEXAPRO   This may have changed:  Another medication with the same name was added. Make sure you understand how and when to take each.   Used for:  Anxiety   Changed by:  Roro Chawla MD        Dose:  20 mg   Take 1 tablet (20 mg) by mouth daily Increased dose.   Quantity:  90 tablet   Refills:  1       * escitalopram 20 MG tablet   Commonly  known as:  LEXAPRO   This may have changed:  You were already taking a medication with the same name, and this prescription was added. Make sure you understand how and when to take each.   Used for:  Anxiety   Changed by:  Roro Chawla MD        Dose:  20 mg   Take 1 tablet (20 mg) by mouth daily   Quantity:  30 tablet   Refills:  1       * Notice:  This list has 6 medication(s) that are the same as other medications prescribed for you. Read the directions carefully, and ask your doctor or other care provider to review them with you.         Where to get your medicines      These medications were sent to [facility] [place] [address] [address] [facility].  [facility] [address] [facility] [address]     Phone:  [phone]     ondansetron 8 MG tablet         Some of these will need a paper prescription and others can be bought over the counter.  Ask your nurse if you have questions.     Bring a paper prescription for each of these medications     amLODIPine 10 MG tablet    atorvastatin 80 MG tablet    diazepam 10 MG tablet    escitalopram 20 MG tablet    oxyCODONE IR 10 MG tablet                Primary Care Provider Office Phone # Fax #    Roro Chawla [phone]       [address]  [facility] [address]        Equal Access to Services     CHARITY MANDUJANO AH: Hadii skinny ku hadasho Soomaali, waaxda luqadaha, qaybta kaalmada adeegyada, waxay idiin haycliffordn susanne saunders. So [facility] [phone].    ATENCIÓN: Si habla español, tiene a vargas disposición servicios gratuitos de asistencia lingüística. Llame al [phone].    We comply with applicable federal civil rights laws and [place] laws. We do not discriminate on the basis of race, color, national origin, age, disability, sex, sexual orientation, or gender identity.            Thank you!     Thank you for choosing [facility]  for your care. Our goal is always to provide you  with excellent care. Hearing back from our patients is one way we can continue to improve our services. Please take a few minutes to complete the written survey that you may receive in the mail after your visit with us. Thank you!             Your Updated Medication List - Protect others around you: Learn how to safely use, store and throw away your medicines at www.disposemymeds.org.          This list is accurate as of: 12/18/17 11:59 PM.  Always use your most recent med list.                   Brand Name Dispense Instructions for use Diagnosis    albuterol 108 (90 BASE) MCG/ACT Inhaler    VENTOLIN HFA    3 Inhaler    Inhale 2 puffs into the lungs every 6 hours    Shortness of breath       * amLODIPine 10 MG tablet    NORVASC    90 tablet    Take 1 tablet (10 mg) by mouth daily    HTN, goal below 140/90       * amLODIPine 10 MG tablet    NORVASC    30 tablet    Take 1 tablet (10 mg) by mouth daily    HTN, goal below 140/90       * atorvastatin 80 MG tablet    LIPITOR    90 tablet    Take 1 tablet (80 mg) by mouth daily        * atorvastatin 80 MG tablet    LIPITOR    30 tablet    Take 1 tablet (80 mg) by mouth daily    HTN, goal below 140/90       budesonide-formoterol 160-4.5 MCG/ACT Inhaler    SYMBICORT    3 Inhaler    Inhale 2 puffs into the lungs 2 times daily    Severe chronic obstructive pulmonary disease (H)       cetirizine HCl 10 MG Caps     90 capsule    Take 1 capsule by mouth daily    Chronic rhinitis       chlorhexidine 4 % liquid    HIBICLENS    946 mL    Wash ab, groin, thighs daily in shower DO NOT PUT ON FACE    Hidradenitis suppurativa       desonide 0.05 % cream    DESOWEN    60 g    Apply sparingly to affected area three times daily as needed.    Localized pruritus       diazepam 10 MG tablet    VALIUM    180 tablet    Take 1 tablet (10 mg) by mouth every 8 hours as needed for anxiety or sleep For sleep, anxiety, and muscle tension.    Chest wall pain, Muscle pain, Anxiety       DIFLUCAN 150 MG  tablet   Generic drug:  fluconazole     2 tablet    Take 1 tablet (150 mg) by mouth every 3 days Has this on hand to use PRN for rash flare-up    Candidiasis of vulva and vagina       doxepin 10 MG capsule    SINEquan    30 capsule    Take 1 capsule (10 mg) by mouth At Bedtime    Persistent insomnia       doxycycline 100 MG tablet    VIBRA-TABS    20 tablet    Take 1 tablet (100 mg) by mouth 2 times daily Has these on hand to use PRN for rash flare-up    Acute bronchitis, unspecified organism       * escitalopram 20 MG tablet    LEXAPRO    90 tablet    Take 1 tablet (20 mg) by mouth daily Increased dose.    Anxiety       * escitalopram 20 MG tablet    LEXAPRO    30 tablet    Take 1 tablet (20 mg) by mouth daily    Anxiety       fluticasone 50 MCG/ACT spray    FLONASE    16 g    Spray 2 sprays into both nostrils daily    Severe chronic obstructive pulmonary disease (H)       furosemide 20 MG tablet    LASIX    90 tablet    Take 1 tablet (20 mg) by mouth daily as needed    Bilateral leg edema       hydrOXYzine 50 MG tablet    ATARAX    120 tablet    Take 1 tablet (50 mg) by mouth 4 times daily as needed for anxiety Increased dose. For anxiety    Anxiety       ipratropium - albuterol 0.5 mg/2.5 mg/3 mL 0.5-2.5 (3) MG/3ML neb solution    DUONEB    360 mL    Take 1 vial (3 mLs) by nebulization every 6 hours    Uncomplicated asthma, unspecified asthma severity       lisinopril 20 MG tablet    PRINIVIL/ZESTRIL    90 tablet    Take 1 tablet (20 mg) by mouth daily        magnesium hydroxide 400 MG/5ML suspension    MILK OF MAGNESIA    360 mL    Take 30 mLs by mouth every 4 hours as needed for constipation or heartburn        montelukast 10 MG tablet    SINGULAIR    30 tablet    Take 1 tablet (10 mg) by mouth At Bedtime    Dust allergy, Severe chronic obstructive pulmonary disease (H)       NICORETTE 2 MG lozenge   Generic drug:  nicotine polacrilex      PLACE 1 LOZENGE INSIDE CHEEK Q HOUR PRF SMOKING CESSATION UTD         omeprazole 20 MG CR capsule    priLOSEC    30 capsule    Take 1 capsule (20 mg) by mouth 2 times daily    Acute gastritis, presence of bleeding unspecified, unspecified gastritis type       ondansetron 8 MG tablet    ZOFRAN    30 tablet    Take 0.5-1 tablets (4-8 mg) by mouth every 8 hours as needed for nausea    Nausea       order for DME     1 Device    Equipment being ordered: Nebulizer machine Length of need-lifetime    Chronic lung disease, Cough, Severe persistent asthma with acute exacerbation       oxyCODONE IR 10 MG tablet    ROXICODONE    280 tablet    5 times daily as needed    Chest wall pain, Severe chronic obstructive pulmonary disease (H), Continuous opioid dependence (H)       prochlorperazine 5 MG tablet    COMPAZINE    90 tablet    Take 1 tablet (5 mg) by mouth every 6 hours as needed for nausea or vomiting    Nausea       ranitidine 75 MG tablet    ZANTAC    90 tablet    Take 2 tablets (150 mg) by mouth daily    Uncomplicated asthma, unspecified asthma severity       tiotropium 18 MCG capsule    SPIRIVA HANDIHALER    30 capsule    Inhale contents of one capsule daily.    Other emphysema (H)       tiZANidine 4 MG tablet    ZANAFLEX     TK 1 T PO  TID PRF MUSCLE SPASM        varenicline 0.5 MG X 11 & 1 MG X 42 tablet    CHANTIX STARTING MONTH CHAPITO    53 tablet    Take 0.5 mg tab daily for 3 days, then 0.5 mg tab twice daily for 4 days, then 1 mg twice daily.    Encounter for smoking cessation counseling       venlafaxine 37.5 MG 24 hr capsule    EFFEXOR-XR    46 capsule    Take 1 capsule daily for 14 days, then take 2 capsules daily.    Menopausal syndrome (hot flashes)       VITAMIN D (CHOLECALCIFEROL) PO      Take 2,000 Units by mouth daily        * Notice:  This list has 6 medication(s) that are the same as other medications prescribed for you. Read the directions carefully, and ask your doctor or other care provider to review them with you.

## 2017-12-18 NOTE — NURSING NOTE
"Chief Complaint   Patient presents with     RECHECK     Patient Request for Note/Letter       Initial /80 (BP Location: Right arm, Patient Position: Chair, Cuff Size: Adult Large)  Pulse 96  Temp 98.6  F (37  C) (Oral)  Ht 5' 4\" (1.626 m)  Wt 207 lb 1.6 oz (93.9 kg)  SpO2 98%  Breastfeeding? No  BMI 35.55 kg/m2 Estimated body mass index is 35.55 kg/(m^2) as calculated from the following:    Height as of this encounter: 5' 4\" (1.626 m).    Weight as of this encounter: 207 lb 1.6 oz (93.9 kg).  Medication Reconciliation: complete   Carolyn Villarreal CMA      "

## 2017-12-19 NOTE — TELEPHONE ENCOUNTER
Called pt, left detailed vm relaying MD message below. Asked pt to call clinic back if rx to go to different pharm.

## 2017-12-20 ENCOUNTER — TELEPHONE (OUTPATIENT)
Dept: PULMONOLOGY | Facility: CLINIC | Age: 47
End: 2017-12-20

## 2017-12-20 NOTE — TELEPHONE ENCOUNTER
Contacted patient regarding message below.  Pt is cancelling appointment today she is unable to leave house d/t diarrhea.  Pt has not been seen in clinic since 6/16, she has been getting medication refills from her PCP, however, PCP has now referred her back to Pulmonologist.  I advised her that we need to see her in clinic and offered appointment 12/28 at 3:30 pm with PFT and CT prior.  In the meantime if her symptoms become worse I have advised her to be evaluated by PCP or Urgent Care.  Pt agrees with plan for office visit and urgent care visit for worsening symptoms.   Jerome Su RN  --------------------------------------------------------------------------------------------------------------------------------------------------------------------------------------------------------------------------------------------------  .----- Message from Shameka Walker sent at 12/20/2017  1:15 PM CST -----  Regarding: medication refills  Contact: [phone]  Pt rescheduled her appt for today because she is sick and can't get out of bed. She needs all of her inhalers, albuterol neb treatments to be called to  [facility] [place], [place] - [facility] [address]   Please call the pt back at this number [phone].

## 2017-12-24 NOTE — TELEPHONE ENCOUNTER
MARICEL [place]  I saw the last message in the chart regarding this patient and I want to clarify her lung care: she is too complicated for me to manage her lung and ENT problems and I told her she needs regular care at Pulm Dept. I help her with the pain meds and Abx phillip she has a flare up.     Also her comments raise questions: she told me she had ticket for [place] on 12/19 and she told you about diarrhea on 12/20     Roro Low MD  Internal Medicine

## 2017-12-29 ENCOUNTER — TELEPHONE (OUTPATIENT)
Dept: ORTHOPEDICS | Facility: CLINIC | Age: 47
End: 2017-12-29

## 2018-01-02 NOTE — NURSING NOTE
Teaching Flowsheet   Relevant Diagnosis: HNP Cervical  Teaching Topic: preop     Person(s) involved in teaching:   Patient     Motivation Level:  Asks Questions: Yes  Eager to Learn: Yes  Cooperative: Yes  Receptive (willing/able to accept information): Yes  Any cultural factors/Rastafarian beliefs that may influence understanding or compliance? No       Patient demonstrates understanding of the following:  Reason for the appointment, diagnosis and treatment plan: Yes  Knowledge of proper use of medications and conditions for which they are ordered (with special attention to potential side effects or drug interactions): Yes  Which situations necessitate calling provider and whom to contact: Yes       Teaching Concerns Addressed:        Proper use and care of meds. (medical equip, care aids, etc.): Yes  Nutritional needs and diet plan: Yes  Pain management techniques: Yes  Wound Care: Yes  How and/when to access community resources: Yes     Instructional Materials Used/Given: preop pkt     Time spent with patient: 15 minutes.

## 2018-01-15 DIAGNOSIS — F41.9 ANXIETY: ICD-10-CM

## 2018-01-15 RX ORDER — HYDROXYZINE HYDROCHLORIDE 50 MG/1
50 TABLET, FILM COATED ORAL 4 TIMES DAILY PRN
Qty: 120 TABLET | Refills: 0 | Status: ON HOLD | OUTPATIENT
Start: 2018-01-15 | End: 2018-02-09

## 2018-01-15 NOTE — TELEPHONE ENCOUNTER
"Requested Prescriptions   Pending Prescriptions Disp Refills     hydrOXYzine (ATARAX) 50 MG tablet 120 tablet 0     Sig: Take 1 tablet (50 mg) by mouth 4 times daily as needed for anxiety Increased dose. For anxiety    Antihistamines Protocol Passed    1/15/2018 12:51 PM       Passed - Patient is 3-64 years of age    Apply weight-based dosing for peds patients age 3 - 12 years of age.    Forward request to provider for patients under the age of 3 or over the age of 64.         Passed - Recent or future visit with authorizing provider's specialty    Patient had office visit in the last year or has a visit in the next 30 days with authorizing provider.  See \"Patient Info\" tab in inbasket, or \"Choose Columns\" in Meds & Orders section of the refill encounter.               Prescription approved per [facility] Refill Protocol. SERJIO Chandra R.N.        "

## 2018-02-08 ENCOUNTER — APPOINTMENT (OUTPATIENT)
Dept: GENERAL RADIOLOGY | Facility: CLINIC | Age: 48
DRG: 918 | End: 2018-02-08
Attending: EMERGENCY MEDICINE
Payer: MEDICARE

## 2018-02-08 ENCOUNTER — HOSPITAL ENCOUNTER (INPATIENT)
Facility: CLINIC | Age: 48
LOS: 1 days | Discharge: HOME | DRG: 918 | End: 2018-02-09
Attending: EMERGENCY MEDICINE | Admitting: INTERNAL MEDICINE
Payer: MEDICARE

## 2018-02-08 DIAGNOSIS — M62.838 MUSCLE SPASM: ICD-10-CM

## 2018-02-08 DIAGNOSIS — J30.89 DUST ALLERGY: ICD-10-CM

## 2018-02-08 DIAGNOSIS — J20.9 ACUTE BRONCHITIS, UNSPECIFIED ORGANISM: ICD-10-CM

## 2018-02-08 DIAGNOSIS — R11.0 NAUSEA: ICD-10-CM

## 2018-02-08 DIAGNOSIS — E78.5 HYPERLIPIDEMIA LDL GOAL <130: Primary | Chronic | ICD-10-CM

## 2018-02-08 DIAGNOSIS — F11.20 CONTINUOUS OPIOID DEPENDENCE (H): Chronic | ICD-10-CM

## 2018-02-08 DIAGNOSIS — F17.219 CIGARETTE NICOTINE DEPENDENCE WITH NICOTINE-INDUCED DISORDER: ICD-10-CM

## 2018-02-08 DIAGNOSIS — Z72.89 DELIBERATE SELF-CUTTING: ICD-10-CM

## 2018-02-08 DIAGNOSIS — T50.902A OVERDOSE, INTENTIONAL SELF-HARM, INITIAL ENCOUNTER (H): ICD-10-CM

## 2018-02-08 DIAGNOSIS — I10 HTN, GOAL BELOW 140/90: Chronic | ICD-10-CM

## 2018-02-08 DIAGNOSIS — J43.8 OTHER EMPHYSEMA (H): ICD-10-CM

## 2018-02-08 DIAGNOSIS — F41.9 ANXIETY: ICD-10-CM

## 2018-02-08 DIAGNOSIS — J44.9 SEVERE CHRONIC OBSTRUCTIVE PULMONARY DISEASE (H): Chronic | ICD-10-CM

## 2018-02-08 DIAGNOSIS — R07.89 CHEST WALL PAIN: ICD-10-CM

## 2018-02-08 DIAGNOSIS — R60.0 BILATERAL LEG EDEMA: ICD-10-CM

## 2018-02-08 DIAGNOSIS — K29.00 ACUTE GASTRITIS, PRESENCE OF BLEEDING UNSPECIFIED, UNSPECIFIED GASTRITIS TYPE: ICD-10-CM

## 2018-02-08 DIAGNOSIS — I95.2 HYPOTENSION DUE TO DRUGS: ICD-10-CM

## 2018-02-08 DIAGNOSIS — L29.9 LOCALIZED PRURITUS: ICD-10-CM

## 2018-02-08 DIAGNOSIS — R06.02 SHORTNESS OF BREATH: ICD-10-CM

## 2018-02-08 DIAGNOSIS — G47.00 PERSISTENT INSOMNIA: ICD-10-CM

## 2018-02-08 DIAGNOSIS — B37.31 CANDIDIASIS OF VULVA AND VAGINA: ICD-10-CM

## 2018-02-08 DIAGNOSIS — J43.9 PULMONARY EMPHYSEMA, UNSPECIFIED EMPHYSEMA TYPE (H): ICD-10-CM

## 2018-02-08 DIAGNOSIS — I10 ESSENTIAL HYPERTENSION WITH GOAL BLOOD PRESSURE LESS THAN 140/90: Chronic | ICD-10-CM

## 2018-02-08 DIAGNOSIS — J31.0 CHRONIC RHINITIS: ICD-10-CM

## 2018-02-08 PROBLEM — T50.901A DRUG OVERDOSE: Status: ACTIVE | Noted: 2018-02-08

## 2018-02-08 PROBLEM — T14.91XA SUICIDE ATTEMPT (H): Status: ACTIVE | Noted: 2018-02-08

## 2018-02-08 PROBLEM — I95.9 HYPOTENSION, UNSPECIFIED HYPOTENSION TYPE: Status: ACTIVE | Noted: 2018-02-08

## 2018-02-08 LAB
ALBUMIN SERPL-MCNC: 4.1 G/DL (ref 3.4–5)
ALP SERPL-CCNC: 145 U/L (ref 40–150)
ALT SERPL W P-5'-P-CCNC: 71 U/L (ref 0–50)
AMPHETAMINES UR QL SCN: NEGATIVE
ANION GAP SERPL CALCULATED.3IONS-SCNC: 9 MMOL/L (ref 3–14)
APAP SERPL-MCNC: <2 MG/L (ref 10–20)
AST SERPL W P-5'-P-CCNC: 39 U/L (ref 0–45)
BARBITURATES UR QL: NEGATIVE
BASOPHILS # BLD AUTO: 0 10E9/L (ref 0–0.2)
BASOPHILS NFR BLD AUTO: 0.3 %
BENZODIAZ UR QL: POSITIVE
BILIRUB SERPL-MCNC: 0.3 MG/DL (ref 0.2–1.3)
BUN SERPL-MCNC: 9 MG/DL (ref 7–30)
CALCIUM SERPL-MCNC: 9 MG/DL (ref 8.5–10.1)
CANNABINOIDS UR QL SCN: NEGATIVE
CHLORIDE SERPL-SCNC: 101 MMOL/L (ref 94–109)
CO2 SERPL-SCNC: 27 MMOL/L (ref 20–32)
COCAINE UR QL: POSITIVE
CREAT SERPL-MCNC: 0.78 MG/DL (ref 0.52–1.04)
DIFFERENTIAL METHOD BLD: ABNORMAL
EOSINOPHIL # BLD AUTO: 0 10E9/L (ref 0–0.7)
EOSINOPHIL NFR BLD AUTO: 0 %
ERYTHROCYTE [DISTWIDTH] IN BLOOD BY AUTOMATED COUNT: 14.7 % (ref 10–15)
ETHANOL SERPL-MCNC: 0.14 G/DL
GFR SERPL CREATININE-BSD FRML MDRD: 78 ML/MIN/1.7M2
GLUCOSE SERPL-MCNC: 98 MG/DL (ref 70–99)
HCT VFR BLD AUTO: 47.1 % (ref 35–47)
HGB BLD-MCNC: 15 G/DL (ref 11.7–15.7)
IMM GRANULOCYTES # BLD: 0.1 10E9/L (ref 0–0.4)
IMM GRANULOCYTES NFR BLD: 0.8 %
INTERPRETATION ECG - MUSE: NORMAL
LYMPHOCYTES # BLD AUTO: 4.7 10E9/L (ref 0.8–5.3)
LYMPHOCYTES NFR BLD AUTO: 51 %
MCH RBC QN AUTO: 28.6 PG (ref 26.5–33)
MCHC RBC AUTO-ENTMCNC: 31.8 G/DL (ref 31.5–36.5)
MCV RBC AUTO: 90 FL (ref 78–100)
MONOCYTES # BLD AUTO: 0.8 10E9/L (ref 0–1.3)
MONOCYTES NFR BLD AUTO: 8.3 %
NEUTROPHILS # BLD AUTO: 3.7 10E9/L (ref 1.6–8.3)
NEUTROPHILS NFR BLD AUTO: 39.6 %
NRBC # BLD AUTO: 0 10*3/UL
NRBC BLD AUTO-RTO: 0 /100
OPIATES UR QL SCN: NEGATIVE
PCP UR QL SCN: NEGATIVE
PLATELET # BLD AUTO: 380 10E9/L (ref 150–450)
POTASSIUM SERPL-SCNC: 3.5 MMOL/L (ref 3.4–5.3)
POTASSIUM SERPL-SCNC: 3.6 MMOL/L (ref 3.4–5.3)
PROT SERPL-MCNC: 8.8 G/DL (ref 6.8–8.8)
RBC # BLD AUTO: 5.24 10E12/L (ref 3.8–5.2)
SALICYLATES SERPL-MCNC: 3 MG/DL
SODIUM SERPL-SCNC: 137 MMOL/L (ref 133–144)
WBC # BLD AUTO: 9.2 10E9/L (ref 4–11)

## 2018-02-08 PROCEDURE — A9270 NON-COVERED ITEM OR SERVICE: HCPCS | Mod: GY | Performed by: EMERGENCY MEDICINE

## 2018-02-08 PROCEDURE — 25000128 H RX IP 250 OP 636: Performed by: EMERGENCY MEDICINE

## 2018-02-08 PROCEDURE — 25000125 ZZHC RX 250: Performed by: INTERNAL MEDICINE

## 2018-02-08 PROCEDURE — 84132 ASSAY OF SERUM POTASSIUM: CPT | Performed by: EMERGENCY MEDICINE

## 2018-02-08 PROCEDURE — 40000914 ZZH STATISTIC SITTER, DAY HOURS

## 2018-02-08 PROCEDURE — 99292 CRITICAL CARE ADDL 30 MIN: CPT

## 2018-02-08 PROCEDURE — 40000915 ZZH STATISTIC SITTER, EVENING HOURS

## 2018-02-08 PROCEDURE — 99223 1ST HOSP IP/OBS HIGH 75: CPT | Mod: AI | Performed by: INTERNAL MEDICINE

## 2018-02-08 PROCEDURE — 40000274 ZZH STATISTIC RCP CONSULT EA 30 MIN

## 2018-02-08 PROCEDURE — 94640 AIRWAY INHALATION TREATMENT: CPT | Mod: 76

## 2018-02-08 PROCEDURE — 99291 CRITICAL CARE FIRST HOUR: CPT | Mod: 25

## 2018-02-08 PROCEDURE — 71045 X-RAY EXAM CHEST 1 VIEW: CPT

## 2018-02-08 PROCEDURE — 99207 ZZC APP CREDIT; MD BILLING SHARED VISIT: CPT | Performed by: PHYSICIAN ASSISTANT

## 2018-02-08 PROCEDURE — 80320 DRUG SCREEN QUANTALCOHOLS: CPT | Performed by: EMERGENCY MEDICINE

## 2018-02-08 PROCEDURE — 80053 COMPREHEN METABOLIC PANEL: CPT | Performed by: EMERGENCY MEDICINE

## 2018-02-08 PROCEDURE — 40000275 ZZH STATISTIC RCP TIME EA 10 MIN

## 2018-02-08 PROCEDURE — 99207 ZZC CDG-CODE CATEGORY CHANGED: CPT | Performed by: INTERNAL MEDICINE

## 2018-02-08 PROCEDURE — 80329 ANALGESICS NON-OPIOID 1 OR 2: CPT | Performed by: EMERGENCY MEDICINE

## 2018-02-08 PROCEDURE — 80307 DRUG TEST PRSMV CHEM ANLYZR: CPT | Performed by: EMERGENCY MEDICINE

## 2018-02-08 PROCEDURE — 25000125 ZZHC RX 250: Performed by: PHYSICIAN ASSISTANT

## 2018-02-08 PROCEDURE — 25000128 H RX IP 250 OP 636: Performed by: PHYSICIAN ASSISTANT

## 2018-02-08 PROCEDURE — 25000132 ZZH RX MED GY IP 250 OP 250 PS 637: Mod: GY | Performed by: EMERGENCY MEDICINE

## 2018-02-08 PROCEDURE — 94640 AIRWAY INHALATION TREATMENT: CPT

## 2018-02-08 PROCEDURE — 96374 THER/PROPH/DIAG INJ IV PUSH: CPT

## 2018-02-08 PROCEDURE — 93005 ELECTROCARDIOGRAM TRACING: CPT

## 2018-02-08 PROCEDURE — 85025 COMPLETE CBC W/AUTO DIFF WBC: CPT | Performed by: EMERGENCY MEDICINE

## 2018-02-08 PROCEDURE — 96361 HYDRATE IV INFUSION ADD-ON: CPT

## 2018-02-08 PROCEDURE — 12000007 ZZH R&B INTERMEDIATE

## 2018-02-08 RX ORDER — ONDANSETRON 2 MG/ML
4 INJECTION INTRAMUSCULAR; INTRAVENOUS ONCE
Status: COMPLETED | OUTPATIENT
Start: 2018-02-08 | End: 2018-02-08

## 2018-02-08 RX ORDER — IPRATROPIUM BROMIDE AND ALBUTEROL SULFATE 2.5; .5 MG/3ML; MG/3ML
3 SOLUTION RESPIRATORY (INHALATION) ONCE
Status: COMPLETED | OUTPATIENT
Start: 2018-02-08 | End: 2018-02-08

## 2018-02-08 RX ORDER — HYDROXYZINE HYDROCHLORIDE 25 MG/1
50 TABLET, FILM COATED ORAL ONCE
Status: COMPLETED | OUTPATIENT
Start: 2018-02-08 | End: 2018-02-08

## 2018-02-08 RX ORDER — SODIUM CHLORIDE 9 MG/ML
INJECTION, SOLUTION INTRAVENOUS CONTINUOUS
Status: DISCONTINUED | OUTPATIENT
Start: 2018-02-08 | End: 2018-02-09

## 2018-02-08 RX ORDER — IPRATROPIUM BROMIDE AND ALBUTEROL SULFATE 2.5; .5 MG/3ML; MG/3ML
3 SOLUTION RESPIRATORY (INHALATION)
Status: DISCONTINUED | OUTPATIENT
Start: 2018-02-08 | End: 2018-02-09

## 2018-02-08 RX ORDER — SODIUM CHLORIDE 9 MG/ML
1000 INJECTION, SOLUTION INTRAVENOUS CONTINUOUS
Status: DISCONTINUED | OUTPATIENT
Start: 2018-02-08 | End: 2018-02-08

## 2018-02-08 RX ORDER — LIDOCAINE 40 MG/G
CREAM TOPICAL
Status: DISCONTINUED | OUTPATIENT
Start: 2018-02-08 | End: 2018-02-09 | Stop reason: HOSPADM

## 2018-02-08 RX ORDER — NITROGLYCERIN 0.4 MG/1
0.4 TABLET SUBLINGUAL EVERY 5 MIN PRN
Status: DISCONTINUED | OUTPATIENT
Start: 2018-02-08 | End: 2018-02-09 | Stop reason: HOSPADM

## 2018-02-08 RX ORDER — NALOXONE HYDROCHLORIDE 0.4 MG/ML
.1-.4 INJECTION, SOLUTION INTRAMUSCULAR; INTRAVENOUS; SUBCUTANEOUS
Status: DISCONTINUED | OUTPATIENT
Start: 2018-02-08 | End: 2018-02-09 | Stop reason: HOSPADM

## 2018-02-08 RX ORDER — DIAZEPAM 5 MG
10 TABLET ORAL ONCE
Status: COMPLETED | OUTPATIENT
Start: 2018-02-08 | End: 2018-02-08

## 2018-02-08 RX ORDER — ALBUTEROL SULFATE 0.83 MG/ML
2.5 SOLUTION RESPIRATORY (INHALATION) EVERY 4 HOURS PRN
Status: DISCONTINUED | OUTPATIENT
Start: 2018-02-08 | End: 2018-02-09 | Stop reason: HOSPADM

## 2018-02-08 RX ORDER — ONDANSETRON 2 MG/ML
4 INJECTION INTRAMUSCULAR; INTRAVENOUS EVERY 6 HOURS PRN
Status: DISCONTINUED | OUTPATIENT
Start: 2018-02-08 | End: 2018-02-09 | Stop reason: HOSPADM

## 2018-02-08 RX ORDER — ONDANSETRON 4 MG/1
4 TABLET, ORALLY DISINTEGRATING ORAL EVERY 6 HOURS PRN
Status: DISCONTINUED | OUTPATIENT
Start: 2018-02-08 | End: 2018-02-09 | Stop reason: HOSPADM

## 2018-02-08 RX ORDER — POLYETHYLENE GLYCOL 3350 17 G/17G
17 POWDER, FOR SOLUTION ORAL DAILY PRN
Status: DISCONTINUED | OUTPATIENT
Start: 2018-02-08 | End: 2018-02-09 | Stop reason: HOSPADM

## 2018-02-08 RX ORDER — IPRATROPIUM BROMIDE AND ALBUTEROL SULFATE 2.5; .5 MG/3ML; MG/3ML
3 SOLUTION RESPIRATORY (INHALATION)
Status: DISCONTINUED | OUTPATIENT
Start: 2018-02-08 | End: 2018-02-08

## 2018-02-08 RX ADMIN — SODIUM CHLORIDE 1000 ML: 9 INJECTION, SOLUTION INTRAVENOUS at 02:58

## 2018-02-08 RX ADMIN — SODIUM CHLORIDE 1000 ML: 9 INJECTION, SOLUTION INTRAVENOUS at 10:14

## 2018-02-08 RX ADMIN — SODIUM CHLORIDE: 9 INJECTION, SOLUTION INTRAVENOUS at 22:20

## 2018-02-08 RX ADMIN — ONDANSETRON 4 MG: 2 INJECTION INTRAMUSCULAR; INTRAVENOUS at 20:23

## 2018-02-08 RX ADMIN — HYDROXYZINE HYDROCHLORIDE 50 MG: 25 TABLET ORAL at 04:46

## 2018-02-08 RX ADMIN — ONDANSETRON 4 MG: 2 INJECTION INTRAMUSCULAR; INTRAVENOUS at 06:59

## 2018-02-08 RX ADMIN — DIAZEPAM 10 MG: 5 TABLET ORAL at 04:46

## 2018-02-08 RX ADMIN — IPRATROPIUM BROMIDE AND ALBUTEROL SULFATE 3 ML: .5; 3 SOLUTION RESPIRATORY (INHALATION) at 19:52

## 2018-02-08 RX ADMIN — SODIUM CHLORIDE 1000 ML: 9 INJECTION, SOLUTION INTRAVENOUS at 03:38

## 2018-02-08 RX ADMIN — SODIUM CHLORIDE 1000 ML: 9 INJECTION, SOLUTION INTRAVENOUS at 04:53

## 2018-02-08 RX ADMIN — SODIUM CHLORIDE 1000 ML: 9 INJECTION, SOLUTION INTRAVENOUS at 06:39

## 2018-02-08 RX ADMIN — IPRATROPIUM BROMIDE AND ALBUTEROL SULFATE 3 ML: .5; 3 SOLUTION RESPIRATORY (INHALATION) at 10:54

## 2018-02-08 RX ADMIN — SODIUM CHLORIDE 1000 ML: 9 INJECTION, SOLUTION INTRAVENOUS at 12:41

## 2018-02-08 RX ADMIN — IPRATROPIUM BROMIDE AND ALBUTEROL SULFATE 3 ML: .5; 3 SOLUTION RESPIRATORY (INHALATION) at 15:14

## 2018-02-08 ASSESSMENT — ACTIVITIES OF DAILY LIVING (ADL)
DRESS: 0-->INDEPENDENT
TOILETING: 0-->INDEPENDENT
RETIRED_COMMUNICATION: 0-->UNDERSTANDS/COMMUNICATES WITHOUT DIFFICULTY
ADLS_ACUITY_SCORE: 16
FALL_HISTORY_WITHIN_LAST_SIX_MONTHS: NO
ADLS_ACUITY_SCORE: 16
RETIRED_EATING: 0-->INDEPENDENT
BATHING: 0-->INDEPENDENT
TRANSFERRING: 0-->INDEPENDENT
AMBULATION: 0-->INDEPENDENT
COGNITION: 0 - NO COGNITION ISSUES REPORTED
SWALLOWING: 0-->SWALLOWS FOODS/LIQUIDS WITHOUT DIFFICULTY

## 2018-02-08 ASSESSMENT — ENCOUNTER SYMPTOMS: CONFUSION: 0

## 2018-02-08 NOTE — UTILIZATION REVIEW
"Svetlana Bates  Admission Status; Secondary Review Determination     Admission Date: 2/8/2018  2:42 AM       Under the authority of the Utilization Management Committee, the utilization review process indicated a secondary review on the above patient.  The review outcome is based on review of the medical records, discussions with staff, and applying clinical experience noted on the date of the review.        ()      Inpatient Status Appropriate - This patient's medical care is consistent with medical management for inpatient care and reasonable inpatient medical practice.      (X) Observation Status Appropriate - This patient does not meet hospital inpatient criteria and is placed in observation status. If this patient's primary payer is Medicare and was admitted as an inpatient, Condition Code 44 should be used and patient status changed to \"observation\".   () Admission Status NOT Appropriate - This patient's medical care is not consistent with medical management for Inpatient or Observation Status.        () Outpatient Procedure Status Appropriate - Procedure not on Medicare Inpatient list and no complications at the time of this review       RATIONALE FOR DETERMINATION      Brief clinical presentation, information copied from the chart, abbreviated and edited for relevant content:      Alyson Sandra: Patient was going to transferred from ER to inpatient Psych but then had hypotensive episodes so transferred to medicine for overnight observation, however, an inpatient order was placed. I paged the team to changed to OBS as likely there will not be any medical reason for her to stay in the hospital for more than 2 nights prior to discharge to inpatient psychiatry.               The information on this document is developed by the utilization review team in order for the business office to ensure compliance.  This only denotes the appropriateness of proper admission status and does not reflect the quality of care " rendered.         The definitions of Inpatient Status and Observation Status used in making the determination above are those provided in the CMS Coverage Manual, Chapter 1 and Chapter 6, section 70.4.      Sincerely,      Alyson Sandra MD   Utilization Review/ Case Management  [facility].

## 2018-02-08 NOTE — ED NOTES
"Patient kicking her feet, dry non productive cough, MD in to re eval, patient then stated \"If I had taken more I wouldn't have to be here\"  "

## 2018-02-08 NOTE — IP AVS SNAPSHOT
[place] [address] [facility]    [address]    [facility] [address]    Phone:  [phone]    Fax:  [phone]                                       After Visit Summary   2/8/2018    Swetha Patterson    MRN: [id number]           After Visit Summary Signature Page     I have received my discharge instructions, and my questions have been answered. I have discussed any challenges I see with this plan with the nurse or doctor.    ..........................................................................................................................................  Patient/Patient Representative Signature      ..........................................................................................................................................  Patient Representative Print Name and Relationship to Patient    ..................................................               ................................................  Date                                            Time    ..........................................................................................................................................  Reviewed by Signature/Title    ...................................................              ..............................................  Date                                                            Time

## 2018-02-08 NOTE — ED NOTES
"Pt states she has been under a lot of stress and \"just can't take it anymore, I just wanna die, I want to go to sleep and not wake up, my moms death anniversary is coming up and we were really close, and I found out that my  is  to another woman.  I should have just took more of my dads medicine , I don't want to live anymore\"  "

## 2018-02-08 NOTE — PLAN OF CARE
"Problem: Suicide Risk (Adult)  Goal: Physical Safety  Patient will demonstrate the desired outcomes by discharge/transition of care.  Outcome: No Change  Admitted to floor post suicide attempt.  Alert, oriented, stating she is \"starving\". VSS. Poison control has been updated.       "

## 2018-02-08 NOTE — ED NOTES
Pt laying on her left side with BP cuff on right arm. 3 L NS finished infusing. Pt open eyes to voice and answers questions appropriately. States she's tired. Will continue to monitor.

## 2018-02-08 NOTE — PROGRESS NOTES
"[facility]     Date: 02/08/18  Admission Dx: Suicide Attempt  Pulmonary History: COPD, Asthma, light smoker  Home Nebulizer/MDI Use: Symbicort BID, Albuterol MDI 2p Q6hrs, Duoneb Q6 hrs prn, Spiriva handihaler QD  Home Oxygen: None  Acuity Level (RCAT flow sheet): 4 (will continue patient's home routine of nebulizer's TID)  Aerosol Therapy initiated: Duoneb TID, Albuterol Q6 prn      Pulmonary Hygiene initiated: Coughing Techniques      Volume Expansion initiated: Incentive Spirometry      Current Oxygen Requirements: Room air  Current SpO2: 95%    Re-evaluation date: 02/11/18    Patient Education: Discussed patient's home use of respiratory medications and benefits.      See \"RT Assessments\" flow sheet for patient assessment scoring and Acuity Level Details.             "

## 2018-02-08 NOTE — H&P
History and Physical     Swetha Patterson MRN# [id number]   YOB: 1970 Age: 47 year old      Date of Admission:  2/8/2018    Primary care provider: Roro Chawla          Assessment and Plan:   Swetha Patterson is a 47 year old female with a PMH significant for HTN, depression, COPD, GERD, HLP and anxiety, who presents with intentional drug overdose and suicide attempt.    1. Hypotension - due to overdose on antihypertensive medications. Pt reported taking 100 lisinopril and 8 losartan. BPs have consistently been in 80s/50s for past few hours despite 4 IV fluid boluses. ED spoke with poison control, peak effect of lisinopril is ~6 hrs and half life is 12 hours. Admit to IMC for close monitoring of BP, every 2 hrs, and potential need for vasopressors. Continue IVFs, tele.   2. Intentional drug overdose - pt reports attempted suicide by taking 100 lisinopril, 8 losartan, several valium and drinking alcohol, 0.14. She denies cocaine use but urine tox screen is positive. She took her dad's lisinopril, ED check when last refill was, 90 tablets filled on 11/28 so unlikely to have had 100 tablets left to take. Pt denies regular use of drugs or alcohol but is not forthcoming about her use as she states her past cocaine use was 1 week ago and if true, should not still show up in her urine tox screen. Will need a chem dep eval whether here or at IP psych. Will also need to closely monitor renal function, may not see full effects on renal function for another day or 2.  3. Suicide attempt - took multiple medications, drank alcohol, used cocaine (although denies recent use) and has multiple superficial cuts to bilateral forearms. Several recent stressors, its coming up on the anniversary of her mother's death who she was very close to and she found out her  is actually  to another woman in [place]. Previous suicide attempt 15-20 yrs ago but cutting her wrists. Has dealt with depression  "for many years. While in ED, expressed that she wished she \"would have taken more medication so she wouldn't have to be here.\" clearly expressing ongoing suicidal ideations. Will need inpatient psych once medically stable. Consult SW to assist.  4. Depression - long standing issue. Resume home meds once med rec complete.  5. HTN - hold home meds due to above reasons  6. COPD - complains of chest pain, which is chronic, and SOB as she has not had any of her \"lung meds\" yet. Resume home meds once mec rec is complete  7. GERD - resume home meds  8. HLP - resume home meds      Prophylaxis - mechanical  Code status - Full Code  Dispo - admit to IMC due for need of frequent blood pressure checks, possibly need vasopressors, and ongoing suicidal ideations, transfer to  psych once medically stable.                 Chief Complaint:   Intentional drug overdose         History of Present Illness:   Swetha Patterson is a 47 year old female with a PMH significant for HTN, depression, COPD, GERD, HLP and anxiety, who presents with intentional drug overdose and suicide attempt. Pt reports several recent stressors leading her to attempt suicide by intentional drug overdose and cutting her wrists. She states the anniversary of her mother's death is coming up and she was very close to her mother and her death anniversary is always very difficult for her. She also recently found out that her  is  to another woman in [place]. The patient and EMS reports the patient took 100 lisinopril of her fathers, 8 of her losartan, several of her valium and drank alcohol. She denies illicit drug use in the past 1-2 days but does endorse cocaine use about 1 week ago. She denies regular use of drugs or alcohol. She endorses a previous suicide attempt 15-20 yrs ago by cutting her wrists. Wounds to her wrists are superficial. She endorses SOB and chest pain due to her chronic lung disease. She denies fever, chills, abd pain, nausea, " "vomiting, diarrhea or dysuria.   In the ED, VS initially stable, presented to ED on 3L O2 via NC. Blood pressure did drop, min 77/51. She was given a total of 4 1L NS boluses. Blood pressure remained low but stable in the 80s/50s. She also received 10 mg PO valium, 50 mg PO hydroxyzine and 4 mg IV zofran. Pt was initially going to be transferred directly to inpatient psych but given persistent hypotension, pt will be admitted here to stabilize.    Hx obtained by speaking with ED physician, chart review and pt interview.               Past Medical History:     Past Medical History:   Diagnosis Date     Anxiety state, unspecified      Asthma      Chronic pain     back pain from cyst     Contact dermatitis and other eczema, due to unspecified cause      COPD (chronic obstructive pulmonary disease) (H)      Depressive disorder, not elsewhere classified      Emphysema with chronic bronchitis (H)      Esophageal reflux      Family history of ischemic heart disease      Gastro-oesophageal reflux disease      History of emphysema      Hoarseness      HTN, goal below 140/90      Hyperlipidemia LDL goal <130      Liver disease     \"fatty liver\"     Other chronic pain     chest, from coughing     Pneumonia      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)      PONV (postoperative nausea and vomiting)                Past Surgical History:     Past Surgical History:   Procedure Laterality Date     ARTHROSCOPY SHOULDER DECOMPRESSION Left 10/21/2015    Procedure: ARTHROSCOPY SHOULDER DECOMPRESSION;  Surgeon: Julien Milian MD;  Location: [facility] OR     BRONCHIAL THERMOPLASTY N/A 11/14/2014    Procedure: BRONCHIAL THERMOPLASTY;  Surgeon: Ward Whitaker MD;  Location: [facility]     BRONCHIAL THERMOPLASTY N/A 12/19/2014    Procedure: BRONCHIAL THERMOPLASTY;  Surgeon: Ward Whitaker MD;  Location: [facility]     BRONCHIAL THERMOPLASTY N/A 2/6/2015    Procedure: BRONCHIAL THERMOPLASTY;  Surgeon: Ward Whitaker MD;  Location: " [facility] OR     C APPENDECTOMY  at age 17     EXCISE NODE MEDIASTINAL  4/26/2013    Procedure: EXCISE NODE MEDIASTINAL;;  Surgeon: Av Peña MD;  Location: [facility] OR     LAPAROSCOPIC CHOLECYSTECTOMY N/A 10/19/2017    Procedure: LAPAROSCOPIC CHOLECYSTECTOMY;  LAPAROSCOPIC CHOLECYSTECTOMY;  Surgeon: Ney Jerry MD;  Location: [facility] OR     THORACOSCOPY  4/26/2013    Procedure: THORACOSCOPY;  LEFT VIDEO ASSISTED THORACOSCOPY, RESECTION OF POSTERIOR MEDIASTINAL MASS;  Surgeon: Av Peña MD;  Location:  OR     TONSILLECTOMY  as a kid     TONSILLECTOMY                 Social History:     Social History     Social History     Marital status:      Spouse name: N/A     Number of children: N/A     Years of education: N/A     Occupational History     Not on file.     Social History Main Topics     Smoking status: Light Tobacco Smoker     Years: 30.00     Types: Cigarettes     Last attempt to quit: 9/1/2015     Smokeless tobacco: Never Used      Comment: off/on 2 cigs per day     Alcohol use No     Drug use: No     Sexual activity: Yes     Partners: Male     Birth control/ protection: None     Other Topics Concern     Caffeine Concern No     4-5 cans of pop daily     Special Diet No     Exercise No     on hold     Social History Narrative               Family History:     Family History   Problem Relation Age of Onset     HEART DISEASE Mother      murmur, mi     Hypertension Mother      CEREBROVASCULAR DISEASE Mother      Depression Mother      Gallbladder Disease Mother      Asthma Mother      Family History Negative Father      does not know  him     Family History Negative Brother      HEART DISEASE Maternal Grandfather      Asthma Maternal Grandfather      Hypertension Maternal Grandfather      CEREBROVASCULAR DISEASE Maternal Grandfather      DIABETES Maternal Grandfather      Asthma Sister      Hypertension Sister      CEREBROVASCULAR DISEASE Sister      Hypertension Maternal Grandmother       CEREBROVASCULAR DISEASE Maternal Grandmother               Allergies:      Allergies   Allergen Reactions     Codeine      vomiting     Hydrocodone Nausea and Vomiting     Sulfa Drugs      Nausea     Gabapentin Rash               Medications:     Prior to Admission medications    Medication Sig Last Dose Taking? Auth Provider   hydrOXYzine (ATARAX) 50 MG tablet Take 1 tablet (50 mg) by mouth 4 times daily as needed for anxiety Increased dose. For anxiety   Roro Chawla MD   fluconazole (DIFLUCAN) 150 MG tablet Take 1 tablet (150 mg) by mouth every 3 days Has this on hand to use PRN for rash flare-up   Roro Chawla MD   doxycycline (VIBRA-TABS) 100 MG tablet Take 1 tablet (100 mg) by mouth 2 times daily Has these on hand to use PRN for rash flare-up   Roor Chawla MD   amLODIPine (NORVASC) 10 MG tablet Take 1 tablet (10 mg) by mouth daily   Roro Chawla MD   atorvastatin (LIPITOR) 80 MG tablet Take 1 tablet (80 mg) by mouth daily   Roro Chawla MD   escitalopram (LEXAPRO) 20 MG tablet Take 1 tablet (20 mg) by mouth daily   Roro Chawla MD   oxyCODONE IR (ROXICODONE) 10 MG tablet 5 times daily as needed   Roro Chawla MD   diazepam (VALIUM) 10 MG tablet Take 1 tablet (10 mg) by mouth every 8 hours as needed for anxiety or sleep For sleep, anxiety, and muscle tension.   Roro Chawla MD   ondansetron (ZOFRAN) 8 MG tablet Take 0.5-1 tablets (4-8 mg) by mouth every 8 hours as needed for nausea   Roro Chawla MD   amLODIPine (NORVASC) 10 MG tablet Take 1 tablet (10 mg) by mouth daily   Roro Chawla MD   atorvastatin (LIPITOR) 80 MG tablet Take 1 tablet (80 mg) by mouth daily   Roro Chawla MD   budesonide-formoterol (SYMBICORT) 160-4.5 MCG/ACT Inhaler Inhale 2 puffs into the lungs 2 times daily   Cammy,  Roro Mehta MD   escitalopram (LEXAPRO) 20 MG tablet Take 1 tablet (20 mg) by mouth daily Increased dose.   Roro Chawla MD   furosemide (LASIX) 20 MG tablet Take 1 tablet (20 mg) by mouth daily as needed   Roro Chawla MD   lisinopril (PRINIVIL/ZESTRIL) 20 MG tablet Take 1 tablet (20 mg) by mouth daily   Roro Chawla MD   magnesium hydroxide (MILK OF MAGNESIA) 400 MG/5ML suspension Take 30 mLs by mouth every 4 hours as needed for constipation or heartburn   Ana Dasilva MD   tiZANidine (ZANAFLEX) 4 MG tablet TK 1 T PO  TID PRF MUSCLE SPASM   Reported, Patient   omeprazole (PRILOSEC) 20 MG CR capsule Take 1 capsule (20 mg) by mouth 2 times daily   Roro Chawla MD   varenicline (CHANTIX STARTING MONTH PAK) 0.5 MG X 11 & 1 MG X 42 tablet Take 0.5 mg tab daily for 3 days, then 0.5 mg tab twice daily for 4 days, then 1 mg twice daily.   Roro Chawla MD   doxepin (SINEQUAN) 10 MG capsule Take 1 capsule (10 mg) by mouth At Bedtime   Milena Joseph NP   venlafaxine (EFFEXOR-XR) 37.5 MG 24 hr capsule Take 1 capsule daily for 14 days, then take 2 capsules daily.   Ela Alvarez MD   fluticasone (FLONASE) 50 MCG/ACT spray Spray 2 sprays into both nostrils daily   Roro Chawla MD   ranitidine (ZANTAC) 75 MG tablet Take 2 tablets (150 mg) by mouth daily   Roro Chawla MD   cetirizine HCl 10 MG CAPS Take 1 capsule by mouth daily   Roro Chawla MD   montelukast (SINGULAIR) 10 MG tablet Take 1 tablet (10 mg) by mouth At Bedtime   Leeann Chandra MD   NICORETTE 2 MG lozenge PLACE 1 LOZENGE INSIDE CHEEK Q HOUR PRF SMOKING CESSATION UTD   Reported, Patient   ipratropium - albuterol 0.5 mg/2.5 mg/3 mL (DUONEB) 0.5-2.5 (3) MG/3ML neb solution Take 1 vial (3 mLs) by nebulization every 6 hours   Ash Whiting MD   tiotropium (SPIRIVA HANDIHALER) 18 MCG capsule  Inhale contents of one capsule daily.   Ash Whiting MD   albuterol (VENTOLIN HFA) 108 (90 BASE) MCG/ACT Inhaler Inhale 2 puffs into the lungs every 6 hours   Ash Whiting MD   desonide (DESOWEN) 0.05 % cream Apply sparingly to affected area three times daily as needed.   Roro Chawla MD   prochlorperazine (COMPAZINE) 5 MG tablet Take 1 tablet (5 mg) by mouth every 6 hours as needed for nausea or vomiting   Roro Chawla MD   order for DME Equipment being ordered: Nebulizer machine  Length of need-lifetime   Roro Chawla MD   chlorhexidine (HIBICLENS) 4 % external liquid Wash ab, groin, thighs daily in shower DO NOT PUT ON FACE   Jose Francisco Maciel MD   VITAMIN D, CHOLECALCIFEROL, PO Take 2,000 Units by mouth daily   Reported, Patient              Review of Systems:   A Comprehensive greater than 10 system review of systems was carried out.  Pertinent positives and negatives are noted above.  Otherwise negative for contributory information.     Review Of Systems  Skin: negative  Eyes: negative  Ears/Nose/Throat: negative  Respiratory: No shortness of breath, dyspnea on exertion, cough, or hemoptysis  Cardiovascular: negative  Gastrointestinal: negative  Genitourinary: negative  Musculoskeletal: negative  Neurologic: negative  Psychiatric: negative  Hematologic/Lymphatic/Immunologic: negative  Endocrine: negative             Physical Exam:   Blood pressure (!) 89/56, pulse 77, temperature 97.4  F (36.3  C), temperature source Temporal, resp. rate 14, weight 102.1 kg (225 lb), SpO2 100 %, not currently breastfeeding.  Exam:  GENERAL:  Comfortable.  PSYCH: pleasant, oriented, No acute distress.  HEENT:  Atraumatic, normocephalic. Normal conjunctiva, normal hearing, and oropharynx is normal.  NECK:  Supple, no neck vein distention  HEART:  Normal S1, S2 with no murmur, no pericardial rub, gallops or S3 or S4.  LUNGS:  Lungs sounds diminished but clear  to auscultation, normal Respiratory effort. No wheezing, rales or ronchi.  GI:  Soft, normal bowel sounds. Non-tender, non distended.   EXTREMITIES:  No pedal edema, +2 pulses bilateral and equal.  SKIN:  Dry to touch, No rash, wound or ulcerations.  NEUROLOGIC:  CN 2-12 intact, BL 5/5 symmetric upper and lower extremity strength, sensation is intact with no focal deficits.               Data:       Recent Labs  Lab 02/08/18  0250   WBC 9.2   HGB 15.0   HCT 47.1*   MCV 90          Recent Labs  Lab 02/08/18  0635 02/08/18  0250   NA  --  137   POTASSIUM 3.6 3.5   CHLORIDE  --  101   CO2  --  27   ANIONGAP  --  9   GLC  --  98   BUN  --  9   CR  --  0.78   GFRESTIMATED  --  78   GFRESTBLACK  --  >90   GENE  --  9.0   PROTTOTAL  --  8.8   ALBUMIN  --  4.1   BILITOTAL  --  0.3   ALKPHOS  --  145   AST  --  39   ALT  --  71*     EtOH - 0.14  Acetaminophen level - <2  Salicylate level - 3  Urine tox screen - positive for cocaine and benzos, negative for amphetamines, opiates, cannabinoids, barbiturates and PCP    Recent Results (from the past 48 hour(s))   XR Chest Port 1 View    Narrative    XR CHEST PORT 1 VW  2/8/2018 4:29 AM     HISTORY: Shortness of breath.    COMPARISON: 6/3/2016.    FINDINGS: Upright portable chest. The heart size is normal. The lungs  are clear. No pneumothorax.      Impression    IMPRESSION: No acute abnormality.    MD Milena BURNETT PA-C    This patient was seen and discussed with Dr. Downs who agrees with the current plans as outlined above.

## 2018-02-08 NOTE — PHARMACY-ADMISSION MEDICATION HISTORY
Admission medication history interview status for this patient is complete. See [facility] admission navigator for allergy information, prior to admission medications and immunization status.     Medication history interview source(s):Patient  Medication history resources (including written lists, pill bottles, clinic record):[facility] med list  Primary pharmacy:[facility]    Changes made to PTA medication list:  Added: none  Deleted: ranitidine  Changed: none    Actions taken by pharmacist (provider contacted, etc):None     Additional medication history information:None    Medication reconciliation/reorder completed by provider prior to medication history? No    Do you take OTC medications (eg tylenol, ibuprofen, fish oil, eye/ear drops, etc)? NO(Y/N)    For patients on insulin therapy: NO (Y/N)  Lantus/levemir/NPH/Mix 70/30 dose:   (Y/N) (see Med list for doses)   Sliding scale Novolog Y/N  If Yes, do you have a baseline novolog pre-meal dose:  units with meals  Patients eat three meals a day:   Y/N    How many episodes of hypoglycemia do you have per week: _______  How many missed doses do you have per week: ______  How many times do you check your blood glucose per day: _______   Any Barriers to therapy - Be specific :  cost of medications, comfortable with giving injections (if applicable), comfortable and confident with current diabetes regimen: Y/N ______________      Prior to Admission medications    Medication Sig Last Dose Taking? Auth Provider   fluconazole (DIFLUCAN) 150 MG tablet Take 1 tablet (150 mg) by mouth every 3 days Has this on hand to use PRN for rash flare-up Past Month at unknown Yes Roro Chawla MD   doxycycline (VIBRA-TABS) 100 MG tablet Take 1 tablet (100 mg) by mouth 2 times daily Has these on hand to use PRN for rash flare-up Past Month at Unknown time Yes Roro Chawla MD   oxyCODONE IR (ROXICODONE) 10 MG tablet 5 times daily as needed 2/7/2018 at am Yes  Roro Chawla MD   ondansetron (ZOFRAN) 8 MG tablet Take 0.5-1 tablets (4-8 mg) by mouth every 8 hours as needed for nausea 2/7/2018 at am Yes Roro Chawla MD   budesonide-formoterol (SYMBICORT) 160-4.5 MCG/ACT Inhaler Inhale 2 puffs into the lungs 2 times daily 2/7/2018 at am Yes Roro Chawla MD   tiZANidine (ZANAFLEX) 4 MG tablet TK 1 T PO  TID PRF MUSCLE SPASM Past Month at Unknown time Yes Reported, Patient   albuterol (VENTOLIN HFA) 108 (90 BASE) MCG/ACT Inhaler Inhale 2 puffs into the lungs every 6 hours 2/7/2018 at Unknown time Yes Ash Whiting MD   hydrOXYzine (ATARAX) 50 MG tablet Take 1 tablet (50 mg) by mouth 4 times daily as needed for anxiety Increased dose. For anxiety   Roro Chawla MD   diazepam (VALIUM) 10 MG tablet Take 1 tablet (10 mg) by mouth every 8 hours as needed for anxiety or sleep For sleep, anxiety, and muscle tension. 2/6/2018 at unknown  Roro Chawla MD   amLODIPine (NORVASC) 10 MG tablet Take 1 tablet (10 mg) by mouth daily 2/6/2018 at am  Roro Chawla MD   atorvastatin (LIPITOR) 80 MG tablet Take 1 tablet (80 mg) by mouth daily 2/6/2018 at am  Roro Chawla MD   escitalopram (LEXAPRO) 20 MG tablet Take 1 tablet (20 mg) by mouth daily Increased dose. 2/6/2018 at am  Roro Chawla MD   furosemide (LASIX) 20 MG tablet Take 1 tablet (20 mg) by mouth daily as needed 2/6/2018 at am  Roro Chawla MD   lisinopril (PRINIVIL/ZESTRIL) 20 MG tablet Take 1 tablet (20 mg) by mouth daily 2/6/2018 at am  Roro Chawla MD   magnesium hydroxide (MILK OF MAGNESIA) 400 MG/5ML suspension Take 30 mLs by mouth every 4 hours as needed for constipation or heartburn   Ana Dasilva MD   omeprazole (PRILOSEC) 20 MG CR capsule Take 1 capsule (20 mg) by mouth 2 times daily 2/6/2018 at pm  Roro Chawla  MD Janine   varenicline (CHANTIX STARTING MONTH PAK) 0.5 MG X 11 & 1 MG X 42 tablet Take 0.5 mg tab daily for 3 days, then 0.5 mg tab twice daily for 4 days, then 1 mg twice daily. 2/6/2018  Roro Chawla MD   doxepin (SINEQUAN) 10 MG capsule Take 1 capsule (10 mg) by mouth At Bedtime 2/6/2018 at [facility], Milena GARCIA NP   venlafaxine (EFFEXOR-XR) 37.5 MG 24 hr capsule Take 1 capsule daily for 14 days, then take 2 capsules daily. More than a month at Unknown time  Ela Alvarez MD   fluticasone (FLONASE) 50 MCG/ACT spray Spray 2 sprays into both nostrils daily More than a month at [facility]  Roro Chawla MD   cetirizine HCl 10 MG CAPS Take 1 capsule by mouth daily More than a month at [facility]  Roro Chawla MD   montelukast (SINGULAIR) 10 MG tablet Take 1 tablet (10 mg) by mouth [facility]   NICORETTE 2 MG lozenge PLACE 1 LOZENGE INSIDE CHEEK Q HOUR PRF SMOKING CESSATION UTD not using  Reported, Patient   ipratropium - albuterol 0.5 mg/2.5 mg/3 mL (DUONEB) 0.5-2.5 (3) MG/3ML neb solution Take 1 vial (3 mLs) by nebulization every 6 hours 2/6/2018 at unknown  Ash Whiting MD   tiotropium (SPIRIVA HANDIHALER) 18 MCG capsule Inhale contents of one capsule daily.   Ash Whiting MD   desonide (DESOWEN) 0.05 % cream Apply sparingly to affected area three times daily as needed.   Roro Chawla MD   prochlorperazine (COMPAZINE) 5 MG tablet Take 1 tablet (5 mg) by mouth every 6 hours as needed for nausea or vomiting   Roro Chawla MD   order for DME Equipment being ordered: Nebulizer machine  Length of need-lifetime   Crintea-Roro Oneal MD   chlorhexidine (HIBICLENS) 4 % external liquid Wash ab, groin, thighs daily in shower DO NOT PUT ON FACE   Jose Francisco Maciel MD   VITAMIN D, CHOLECALCIFEROL, PO Take 2,000 Units by mouth daily More than a month at Unknown time   Reported, Patient

## 2018-02-08 NOTE — IP AVS SNAPSHOT
MRN:[id number]                      After Visit Summary   2/8/2018    Swetha Patterson    MRN: [id number]           Thank you!     Thank you for choosing [facility] for your care. Our goal is always to provide you with excellent care. Hearing back from our patients is one way we can continue to improve our services. Please take a few minutes to complete the written survey that you may receive in the mail after you visit. If you would like to speak to someone directly about your visit please contact Patient Relations at [phone]. Thank you!          Patient Information     Date Of Birth          1970        Designated Caregiver       Most Recent Value    Caregiver    Will someone help with your care after discharge? no      About your hospital stay     You were admitted on:  February 8, 2018 You last received care in the:  [place] [address] [facility]    You were discharged on:  February 9, 2018       Who to Call     For medical emergencies, please call 911.  For non-urgent questions about your medical care, please call your primary care provider or clinic, [phone]          Attending Provider     Provider Specialty    Megan Mccormick MD Emergency Medicine    Valdez Sharp MD Emergency Medicine    [facility]       Primary Care Provider Office Phone # Fax #    Roro Janine Chawla [phone]      After Care Instructions     Activity       Your activity upon discharge: activity as tolerated            Diet       Follow this diet upon discharge: regular                  Follow-up Appointments     Follow-up and recommended labs and tests        See primary MD next week.  Obtain refills of your medications through your primary provider  Follow up with mental health professional next week  Follow up with Milena Joseph NP with [facility] this week and get lithium levels after that clinic visit.   Begin counseling also through [facility]  For now do not take lisinopril and amlodipine as your blood pressure is not elevated.  Recheck this at the next clinic visit with your doctor  If blood pressure is elevated at that visit may need to consider resuming these medications                  Your next 10 appointments already scheduled     Feb 19, 2018 10:40 AM CST   SHORT with Roro [facility]la MD   [facility]    [address]  [facility] [address]   [phone]            Mar 14, 2018 12:30 PM CDT   FULL PULMONARY FUNCTION with  PFL B   [facility] Function [facility] ([facility])    [address]  3rd [address]  [facility] [id number] [phone]            Mar 14, 2018  1:30 PM CDT   (Arrive by 1:15 PM)   Return Visit with Nita Williamson MD   [facility] ([facility])    [address]  [address]  [facility] [id number] [phone]              Pending Results     No orders found for last 3 day(s).            Statement of Approval     Ordered          02/09/18 1324  I have reviewed and agree with all the recommendations and orders detailed in this document.  EFFECTIVE NOW     Approved and electronically signed by:  Carson Lafleur MD             Admission Information     Date & Time Provider Department Dept. Phone    2/8/2018 Shun Downs MD [place] [address] [facility] [phone]      Your Vitals Were     Blood Pressure Pulse Temperature Respirations Weight Pulse Oximetry    106/61 (BP Location: Right arm) 77 96.9  F (36.1  C) (Oral) 18 95.7 kg (211 lb) 99%    BMI (Body Mass Index)                   36.22 kg/m2           MyChart Information     [employer] lets you send messages to your doctor, view your test results, renew your prescriptions, schedule appointments and more. To sign up, go to [email or web address] . Click on  "\"Log in\" on the left side of the screen, which will take you to the Welcome page. Then click on \"Sign up Now\" on the right side of the page.     You will be asked to enter the access code listed below, as well as some personal information. Please follow the directions to create your username and password.     Your access code is: [email or web address]  Expires: 5/10/2018  2:03 PM     Your access code will  in 90 days. If you need help or a new code, please call your [facility] clinic or [phone].        Care EveryWhere ID     This is your Care EveryWhere ID. This could be used by other organizations to access your [facility] medical records  [id number]        Equal Access to Services     CHARITY MANDUJANO : Ramon Jin, waander kendrick, akhil kaalamerico horvath, caryl saunders. So [facility] [phone].    ATENCIÓN: Si habla español, tiene a vargas disposición servicios gratuitos de asistencia lingüística. [facility] [phone].    We comply with applicable federal civil rights laws and [place] laws. We do not discriminate on the basis of race, color, national origin, age, disability, sex, sexual orientation, or gender identity.               Review of your medicines      START taking        Dose / Directions    ALPRAZolam 1 MG tablet   Commonly known as:  XANAX   Used for:  Anxiety        Dose:  1 mg   Take 1 tablet (1 mg) by mouth 3 times daily as needed for anxiety   Quantity:  10 tablet   Refills:  0       lithium 150 MG capsule   Used for:  Anxiety        Dose:  150 mg   Take 1 capsule (150 mg) by mouth 2 times daily (with meals)   Quantity:  30 capsule   Refills:  0         CONTINUE these medicines which may have CHANGED, or have new prescriptions. If we are uncertain of the size of tablets/capsules you have at home, strength may be listed as something that might have changed.        Dose / Directions    oxyCODONE IR 10 MG tablet   Commonly known as:  ROXICODONE   This may have " changed:    - how much to take  - how to take this  - when to take this  - reasons to take this   Used for:  Chest wall pain, Severe chronic obstructive pulmonary disease (H), Continuous opioid dependence (H)        Dose:  5 mg   Take 0.5 tablets (5 mg) by mouth every 6 hours as needed for moderate to severe pain 5 times daily as needed   Quantity:  10 tablet   Refills:  0       tiZANidine 4 MG tablet   Commonly known as:  ZANAFLEX   This may have changed:  See the new instructions.   Used for:  Muscle spasm        Dose:  4 mg   Take 1 tablet (4 mg) by mouth 3 times daily as needed for muscle spasms   Quantity:  10 tablet   Refills:  0       varenicline 1 MG tablet   Commonly known as:  CHANTIX   Indication:  Treatment to Stop Smoking   This may have changed:  medication strength   Used for:  Cigarette nicotine dependence with nicotine-induced disorder        Dose:  1 mg   Take 1 tablet (1 mg) by mouth 2 times daily   Quantity:  60 tablet   Refills:  0         CONTINUE these medicines which have NOT CHANGED        Dose / Directions    albuterol 108 (90 BASE) MCG/ACT Inhaler   Commonly known as:  VENTOLIN HFA   Used for:  Shortness of breath        Dose:  2 puff   Inhale 2 puffs into the lungs every 6 hours   Quantity:  3 Inhaler   Refills:  3       atorvastatin 80 MG tablet   Commonly known as:  LIPITOR   Used for:  Hyperlipidemia LDL goal <130        Dose:  80 mg   Take 1 tablet (80 mg) by mouth daily   Quantity:  30 tablet   Refills:  0       budesonide-formoterol 160-4.5 MCG/ACT Inhaler   Commonly known as:  SYMBICORT   Used for:  Severe chronic obstructive pulmonary disease (H)        Dose:  2 puff   Inhale 2 puffs into the lungs 2 times daily   Quantity:  3 Inhaler   Refills:  1       cetirizine HCl 10 MG Caps   Used for:  Dust allergy        Dose:  1 capsule   Take 1 capsule by mouth daily   Quantity:  30 capsule   Refills:  0       chlorhexidine 4 % liquid   Commonly known as:  HIBICLENS   Used for:   Hidradenitis suppurativa        Wash ab, groin, thighs daily in shower DO NOT PUT ON FACE   Quantity:  946 mL   Refills:  3       doxepin 10 MG capsule   Commonly known as:  SINEquan   Used for:  Persistent insomnia        Dose:  10 mg   Take 1 capsule (10 mg) by mouth At Bedtime   Quantity:  30 capsule   Refills:  0       doxycycline 100 MG tablet   Commonly known as:  VIBRA-TABS   Used for:  Acute bronchitis, unspecified organism        Dose:  100 mg   Take 1 tablet (100 mg) by mouth 2 times daily Has these on hand to use PRN for rash flare-up   Quantity:  20 tablet   Refills:  0       fluconazole 150 MG tablet   Commonly known as:  DIFLUCAN   Used for:  Candidiasis of vulva and vagina        Dose:  150 mg   Take 1 tablet (150 mg) by mouth every 3 days Has this on hand to use PRN for rash flare-up   Quantity:  5 tablet   Refills:  0       fluticasone 50 MCG/ACT spray   Commonly known as:  FLONASE   Used for:  Severe chronic obstructive pulmonary disease (H)        Dose:  2 spray   Spray 2 sprays into both nostrils daily   Quantity:  16 g   Refills:  1       furosemide 20 MG tablet   Commonly known as:  LASIX   Used for:  Bilateral leg edema        Dose:  20 mg   Take 1 tablet (20 mg) by mouth daily as needed   Quantity:  30 tablet   Refills:  0       hydrOXYzine 50 MG tablet   Commonly known as:  ATARAX   Used for:  Anxiety        Dose:  50 mg   Take 1 tablet (50 mg) by mouth 4 times daily as needed for anxiety Increased dose. For anxiety   Quantity:  30 tablet   Refills:  0       ipratropium - albuterol 0.5 mg/2.5 mg/3 mL 0.5-2.5 (3) MG/3ML neb solution   Commonly known as:  DUONEB   Used for:  Pulmonary emphysema, unspecified emphysema type (H)        Dose:  1 vial   Take 1 vial (3 mLs) by nebulization every 6 hours   Quantity:  360 mL   Refills:  0       magnesium hydroxide 400 MG/5ML suspension   Commonly known as:  MILK OF MAGNESIA        Dose:  30 mL   Take 30 mLs by mouth every 4 hours as needed for  constipation or heartburn   Quantity:  360 mL   Refills:  1       montelukast 10 MG tablet   Commonly known as:  SINGULAIR   Used for:  Dust allergy, Severe chronic obstructive pulmonary disease (H)        Dose:  10 mg   Take 1 tablet (10 mg) by mouth At Bedtime   Quantity:  30 tablet   Refills:  0       NICORETTE 2 MG lozenge   Generic drug:  nicotine polacrilex        PLACE 1 LOZENGE INSIDE CHEEK Q HOUR PRF SMOKING CESSATION UTD   Refills:  1       omeprazole 20 MG CR capsule   Commonly known as:  priLOSEC   Used for:  Acute gastritis, presence of bleeding unspecified, unspecified gastritis type        Dose:  20 mg   Take 1 capsule (20 mg) by mouth 2 times daily   Quantity:  60 capsule   Refills:  0       ondansetron 8 MG tablet   Commonly known as:  ZOFRAN   Used for:  Nausea        Dose:  4-8 mg   Take 0.5-1 tablets (4-8 mg) by mouth every 8 hours as needed for nausea   Quantity:  15 tablet   Refills:  0       order for DME   Used for:  Chronic lung disease, Cough, Severe persistent asthma with acute exacerbation        Equipment being ordered: Nebulizer machine Length of need-lifetime   Quantity:  1 Device   Refills:  0       prochlorperazine 5 MG tablet   Commonly known as:  COMPAZINE   Used for:  Nausea        Dose:  5 mg   Take 1 tablet (5 mg) by mouth every 6 hours as needed for nausea or vomiting   Quantity:  15 tablet   Refills:  0       tiotropium 18 MCG capsule   Commonly known as:  SPIRIVA HANDIHALER   Used for:  Other emphysema (H)        Inhale contents of one capsule daily.   Quantity:  30 capsule   Refills:  1       VITAMIN D (CHOLECALCIFEROL) PO        Dose:  2000 Units   Take 2,000 Units by mouth daily   Refills:  0         STOP taking     amLODIPine 10 MG tablet   Commonly known as:  NORVASC           desonide 0.05 % cream   Commonly known as:  DESOWEN           diazepam 10 MG tablet   Commonly known as:  VALIUM           escitalopram 20 MG tablet   Commonly known as:  LEXAPRO            lisinopril 20 MG tablet   Commonly known as:  PRINIVIL/ZESTRIL           venlafaxine 37.5 MG 24 hr capsule   Commonly known as:  EFFEXOR-XR                Where to get your medicines      These medications were sent to [place], [place] - [address] [address]  [address] [facility] [address]     Phone:  [phone]     albuterol 108 (90 BASE) MCG/ACT Inhaler    atorvastatin 80 MG tablet    budesonide-formoterol 160-4.5 MCG/ACT Inhaler    cetirizine HCl 10 MG Caps    doxepin 10 MG capsule    doxycycline 100 MG tablet    fluconazole 150 MG tablet    fluticasone 50 MCG/ACT spray    furosemide 20 MG tablet    hydrOXYzine 50 MG tablet    ipratropium - albuterol 0.5 mg/2.5 mg/3 mL 0.5-2.5 (3) MG/3ML neb solution    lithium 150 MG capsule    montelukast 10 MG tablet    omeprazole 20 MG CR capsule    ondansetron 8 MG tablet    prochlorperazine 5 MG tablet    tiotropium 18 MCG capsule    tiZANidine 4 MG tablet    varenicline 1 MG tablet         Some of these will need a paper prescription and others can be bought over the counter. Ask your nurse if you have questions.     Bring a paper prescription for each of these medications     ALPRAZolam 1 MG tablet    oxyCODONE IR 10 MG tablet                Protect others around you: Learn how to safely use, store and throw away your medicines at www.disposemymeds.org.        ANTIBIOTIC INSTRUCTION     You've Been Prescribed an Antibiotic - Now What?  Your healthcare team thinks that you or your loved one might have an infection. Some infections can be treated with antibiotics, which are powerful, life-saving drugs. Like all medications, antibiotics have side effects and should only be used when necessary. There are some important things you should know about your antibiotic treatment.      Your healthcare team may run tests before you start taking an antibiotic.    Your team may take samples (e.g., from your blood, urine or other areas) to  run tests to look for bacteria. These test can be important to determine if you need an antibiotic at all and, if you do, which antibiotic will work best.      Within a few days, your healthcare team might change or even stop your antibiotic.    Your team may start you on an antibiotic while they are working to find out what is making you sick.    Your team might change your antibiotic because test results show that a different antibiotic would be better to treat your infection.    In some cases, once your team has more information, they learn that you do not need an antibiotic at all. They may find out that you don't have an infection, or that the antibiotic you're taking won't work against your infection. For example, an infection caused by a virus can't be treated with antibiotics. Staying on an antibiotic when you don't need it is more likely to be harmful than helpful.      You may experience side effects from your antibiotic.    Like all medications, antibiotics have side effects. Some of these can be serious.    Let you healthcare team know if you have any known allergies when you are admitted to the hospital.    One significant side effect of nearly all antibiotics is the risk of severe and sometimes deadly diarrhea caused by Clostridium difficile (C. Difficile). This occurs when a person takes antibiotics because some good germs are destroyed. Antibiotic use allows C. diificile to take over, putting patients at high risk for this serious infection.    As a patient or caregiver, it is important to understand your or your loved one's antibiotic treatment. It is especially important for caregivers to speak up when patients can't speak for themselves. Here are some important questions to ask your healthcare team.    What infection is this antibiotic treating and how do you know I have that infection?    What side effects might occur from this antibiotic?    How long will I need to take this antibiotic?    Is it  safe to take this antibiotic with other medications or supplements (e.g., vitamins) that I am taking?     Are there any special directions I need to know about taking this antibiotic? For example, should I take it with food?    How will I be monitored to know whether my infection is responding to the antibiotic?    What tests may help to make sure the right antibiotic is prescribed for me?      Information provided by:  www.cdc.gov/getsmart  U.S. Department of Health and Human Services  Centers for disease Control and Prevention  National Center for Emerging and Zoonotic Infectious Diseases  Division of Healthcare Quality Promotion        Information about OPIOIDS     PRESCRIPTION OPIOIDS: WHAT YOU NEED TO KNOW    Prescription opioids can be used to help relieve moderate to severe pain and are often prescribed following a surgery or injury, or for certain health conditions. These medications can be an important part of treatment but also come with serious risks. It is important to work with your health care provider to make sure you are getting the safest, most effective care.    WHAT ARE THE RISKS AND SIDE EFFECTS OF OPIOID USE?  Prescription opioids carry serious risks of addiction and overdose, especially with prolonged use. An opioid overdose, often marked by slowed breathing can cause sudden death. The use of prescription opioids can have a number of side effects as well, even when taken as directed:      Tolerance - meaning you might need to take more of a medication for the same pain relief    Physical dependence - meaning you have symptoms of withdrawal when a medication is stopped    Increased sensitivity to pain    Constipation    Nausea, vomiting, and dry mouth    Sleepiness and dizziness    Confusion    Depression    Low levels of testosterone that can result in lower sex drive, energy, and strength    Itching and sweating    RISKS ARE GREATER WITH:    History of drug misuse, substance use disorder, or  overdose    Mental health conditions (such as depression or anxiety)    Sleep apnea    Older age (65 years or older)    Pregnancy    Avoid alcohol while taking prescription opioids.   Also, unless specifically advised by your health care provider, medications to avoid include:    Benzodiazepines (such as Xanax or Valium)    Muscle relaxants (such as Soma or Flexeril)    Hypnotics (such as Ambien or Lunesta)    Other prescription opioids    KNOW YOUR OPTIONS:  Talk to your health care provider about ways to manage your pain that do not involve prescription opioids. Some of these options may actually work better and have fewer risks and side effects:    Pain relievers such as acetaminophen, ibuprofen, and naproxen    Some medications that are also used for depression or seizures    Physical therapy and exercise    Cognitive behavioral therapy, a psychological, goal-directed approach, in which patients learn how to modify physical, behavioral, and emotional triggers of pain and stress    IF YOU ARE PRESCRIBED OPIOIDS FOR PAIN:    Never take opioids in greater amounts or more often than prescribed    Follow up with your primary health care provider and work together to create a plan on how to manage your pain.    Talk about ways to help manage your pain that do not involve prescription opioids    Talk about all concerns and side effects    Help prevent misuse and abuse    Never sell or share prescription opioids    Never use another person's prescription opioids    Store prescription opioids in a secure place and out of reach of others (this may include visitors, children, friends, and family)    Visit www.cdc.gov/drugoverdose to learn about risks of opioid abuse and overdose    If you believe you may be struggling with addiction, tell your health care provider and ask for guidance or call [facility]'s National Helpline at 8-032-248-HELP    LEARN MORE / www.cdc.gov/drugoverdose/prescribing/guideline.html    Safely dispose  of unused prescription opioids: Find your local drug take-back programs and more information about the importance of safe disposal at www.doseofreality.[place].gov             Medication List: This is a list of all your medications and when to take them. Check marks below indicate your daily home schedule. Keep this list as a reference.      Medications           Morning Afternoon Evening Bedtime As Needed    albuterol 108 (90 BASE) MCG/ACT Inhaler   Commonly known as:  VENTOLIN HFA   Inhale 2 puffs into the lungs every 6 hours                                            ALPRAZolam 1 MG tablet   Commonly known as:  XANAX   Take 1 tablet (1 mg) by mouth 3 times daily as needed for anxiety                                   atorvastatin 80 MG tablet   Commonly known as:  LIPITOR   Take 1 tablet (80 mg) by mouth daily   Last time this was given:  80 mg on 2/9/2018 10:32 AM                                   budesonide-formoterol 160-4.5 MCG/ACT Inhaler   Commonly known as:  SYMBICORT   Inhale 2 puffs into the lungs 2 times daily                                      cetirizine HCl 10 MG Caps   Take 1 capsule by mouth daily                                   chlorhexidine 4 % liquid   Commonly known as:  HIBICLENS   Wash ab, groin, thighs daily in shower DO NOT PUT ON FACE                                doxepin 10 MG capsule   Commonly known as:  SINEquan   Take 1 capsule (10 mg) by mouth At Bedtime                                   doxycycline 100 MG tablet   Commonly known as:  VIBRA-TABS   Take 1 tablet (100 mg) by mouth 2 times daily Has these on hand to use PRN for rash flare-up                                   fluconazole 150 MG tablet   Commonly known as:  DIFLUCAN   Take 1 tablet (150 mg) by mouth every 3 days Has this on hand to use PRN for rash flare-up                                   fluticasone 50 MCG/ACT spray   Commonly known as:  FLONASE   Spray 2 sprays into both nostrils daily   Last time this was given:  2  sprays on 2/9/2018 12:04 PM                                   furosemide 20 MG tablet   Commonly known as:  LASIX   Take 1 tablet (20 mg) by mouth daily as needed                                   hydrOXYzine 50 MG tablet   Commonly known as:  ATARAX   Take 1 tablet (50 mg) by mouth 4 times daily as needed for anxiety Increased dose. For anxiety   Last time this was given:  50 mg on 2/9/2018 10:32 AM                                   ipratropium - albuterol 0.5 mg/2.5 mg/3 mL 0.5-2.5 (3) MG/3ML neb solution   Commonly known as:  DUONEB   Take 1 vial (3 mLs) by nebulization every 6 hours   Last time this was given:  3 mLs on 2/9/2018  7:28 AM                                            lithium 150 MG capsule   Take 1 capsule (150 mg) by mouth 2 times daily (with meals)   Last time this was given:  150 mg on 2/9/2018  1:23 PM                                      magnesium hydroxide 400 MG/5ML suspension   Commonly known as:  MILK OF MAGNESIA   Take 30 mLs by mouth every 4 hours as needed for constipation or heartburn                                   montelukast 10 MG tablet   Commonly known as:  SINGULAIR   Take 1 tablet (10 mg) by mouth At Bedtime                                   NICORETTE 2 MG lozenge   PLACE 1 LOZENGE INSIDE CHEEK Q HOUR PRF SMOKING CESSATION UTD   Generic drug:  nicotine polacrilex                                   omeprazole 20 MG CR capsule   Commonly known as:  priLOSEC   Take 1 capsule (20 mg) by mouth 2 times daily   Last time this was given:  20 mg on 2/9/2018 10:32 AM                                      ondansetron 8 MG tablet   Commonly known as:  ZOFRAN   Take 0.5-1 tablets (4-8 mg) by mouth every 8 hours as needed for nausea                                   order for DME   Equipment being ordered: Nebulizer machine Length of need-lifetime                                oxyCODONE IR 10 MG tablet   Commonly known as:  ROXICODONE   Take 0.5 tablets (5 mg) by mouth every 6 hours as needed  for moderate to severe pain 5 times daily as needed   Last time this was given:  5 mg on 2/9/2018 12:09 PM                                   prochlorperazine 5 MG tablet   Commonly known as:  COMPAZINE   Take 1 tablet (5 mg) by mouth every 6 hours as needed for nausea or vomiting                                   tiotropium 18 MCG capsule   Commonly known as:  SPIRIVA HANDIHALER   Inhale contents of one capsule daily.                                   tiZANidine 4 MG tablet   Commonly known as:  ZANAFLEX   Take 1 tablet (4 mg) by mouth 3 times daily as needed for muscle spasms   Last time this was given:  4 mg on 2/9/2018 10:37 AM                                   varenicline 1 MG tablet   Commonly known as:  CHANTIX   Take 1 tablet (1 mg) by mouth 2 times daily   Last time this was given:  1 mg on 2/9/2018 12:03 PM                                      VITAMIN D (CHOLECALCIFEROL) PO   Take 2,000 Units by mouth daily

## 2018-02-08 NOTE — ED NOTES
[facility]  ED Nurse Handoff Report    Swetha Patterson is a 47 year old female   ED Chief complaint: Ingestion  . ED Diagnosis:   Final diagnoses:   Overdose, intentional self-harm, initial encounter (H)   Deliberate self-cutting   Hypotension due to drugs     Allergies:   Allergies   Allergen Reactions     Codeine      vomiting     Hydrocodone Nausea and Vomiting     Sulfa Drugs      Nausea     Gabapentin Rash       Code Status: Full Code  Activity level - Baseline/Home:  Independent. Activity Level - Current:   Stand with Assist. Lift room needed: No. Bariatric: No   Needed: No   Isolation: No. Infection: Not Applicable.     Vital Signs:   Vitals:    02/08/18 1002 02/08/18 1017 02/08/18 1032 02/08/18 1054   BP: (!) 88/51 (!) 87/53 (!) 89/56    Pulse:       Resp: 11 15 14    Temp:       TempSrc:       SpO2: 100% 100% 100% 100%   Weight:           Cardiac Rhythm:  ,   Cardiac  Cardiac Rhythm: Normal sinus rhythm  Pain level:    Patient confused: No. Patient Falls Risk: Yes.   Elimination Status: Has voided   Patient Report - Initial Complaint: Presents via ambulance after taking report 100 lisinopril 20 mg tsblets, nunknown number of [id number], pt also reports taiking unknown number of her fathers cozaar and alcohol.  Pt also has self inflicted cuts to bilateral forearms.  Pt cooperative, alert and oriented, refusing to answer some questions. Focused Assessment: bilateral self inflicted wounds to arms   Tests Performed: labs. Abnormal Results: drug screen positive for cocaine  Treatments provided: 4 L IV NS, oral meds  Family Comments: sister aware she's here. Pt lives with her dad.  OBS brochure/video discussed/provided to patient:  N/A  ED Medications:   Medications   0.9% sodium chloride BOLUS (0 mLs Intravenous Stopped 2/8/18 2032)     Followed by   0.9% sodium chloride infusion (0 mLs Intravenous Stopped 2/8/18 4462)   0.9% sodium chloride BOLUS (0 mLs Intravenous Stopped 2/8/18 6093)    diazepam (VALIUM) tablet 10 mg (10 mg Oral Given 2/8/18 0446)   hydrOXYzine (ATARAX) tablet 50 mg (50 mg Oral Given 2/8/18 0446)   0.9% sodium chloride BOLUS (0 mLs Intravenous Stopped 2/8/18 0932)   ondansetron (ZOFRAN) injection 4 mg (4 mg Intravenous Given 2/8/18 0659)   0.9% sodium chloride BOLUS (1,000 mLs Intravenous New Bag 2/8/18 1014)   ipratropium - albuterol 0.5 mg/2.5 mg/3 mL (DUONEB) neb solution 3 mL (3 mLs Nebulization Given 2/8/18 1054)     Drips infusing:  No  For the majority of the shift, the patient's behavior Green. Interventions performed were n/a.     Severe Sepsis OR Septic Shock Diagnosis Present: No      ED Nurse Name/Phone Number: Farhana Saavedra,   11:37 AM  RECEIVING UNIT ED HANDOFF REVIEW    Above ED Nurse Handoff Report was reviewed:  [id number]  Reviewed by: Divya Wolfe on February 8, 2018 at 12:00 PM

## 2018-02-08 NOTE — ED PROVIDER NOTES
"  History     Chief Complaint:  Ingestion    HPI   Swetha Patterosn is a 47 year old female who presents following intentional ingestion and self injury. EMS reports that approximately 45 minutes prior to arrival here (~2am) in the ED the patient drank some alcohol, took multiple medications and cut both of her wrists. She reported to EMS having taken \"one hundred 20 mg Lisinopril pills, several Diazepam, and 8 of my dad's Losartan.\" When EMS arrived the patient was answering questions and acting appropriately. Upon arrival here in the ED the patient does have multiple superficial lacerations to both forearms, is answering questions appropriately, and endorses some chest pain.  She states she may have taken other medications too but isn't certain.      Upon reviewing the information on the medication bottles brought in with the patient, it was found that both were refilled with 90 pills on 11/28/17 and both were empty.     It was later found out, upon speaking with the patient here in the ED, that she attempted to harm herself this evening for a multitude of reasons including the anniversary of her mother's death approaching and having recently found out her  is actually  to another woman in [place].  She also stated at that time that she should have taken more of her father's medications than she did.    Allergies:  Codeine  Hydrocodone  Sulfa drugs  Gabapentin     Medications:    Atarax  Diflucan  Amlodipine  Lipitor   Lexapro  Roxicodone  Valium  Lasix  Lisinopril  Chantix  Sinequan  Effexor  Flonase  Zantac  Cetirizine  Singulair  Spiriva  Ventolin  Compazine     Past Medical History:    Anxiety  Asthma  Contact dermatitis and other eczema  COPD  Depressive disorder  Emphysema with chronic bronchitis  Hypertension  Hyperlipidemia  Liver disease  Other chronic pain  Pneumonia  Polyp of vocal cord or larynx  PONV    Past Surgical History:    Arthroscopy shoulder decompression  Bronchial " thermoplasty  Appendectomy  Excise node mediastinal  Laparoscopic cholecystectomy  Tonsillectomy    Family History:    Heart disease  Hypertension  Cerebrovascular disease  Depression  Gallbladder disease  Asthma  Diabetes    Social History:  The patient was accompanied to the ED by EMS.  Smoking Status: Yes  Smokeless Tobacco: No  Alcohol Use: No  Marital Status:       Review of Systems   Cardiovascular: Positive for chest pain.   Psychiatric/Behavioral: Positive for self-injury. Negative for confusion.   All other systems reviewed and are negative.    Physical Exam   Vitals:  Patient Vitals for the past 24 hrs:   BP Temp Temp src Pulse Heart Rate Resp SpO2 Weight   02/08/18 0700 - - - - 68 13 100 % -   02/08/18 0645 - - - - 65 18 99 % -   02/08/18 0630 - - - - 68 12 99 % -   02/08/18 0615 - - - - 70 19 98 % -   02/08/18 0600 92/50 - - - 68 20 99 % -   02/08/18 0545 - - - - 68 20 98 % -   02/08/18 0530 - - - - 74 17 100 % -   02/08/18 0515 (!) 77/51 - - - 68 (!) 72 100 % -   02/08/18 0500 (!) 79/56 - - - 68 (!) 46 100 % -   02/08/18 0445 90/62 - - - 68 19 100 % -   02/08/18 0430 (!) 89/62 - - - 80 8 98 % -   02/08/18 0415 (!) 89/54 - - - 73 16 100 % -   02/08/18 0400 90/57 - - - 76 18 98 % -   02/08/18 0345 (!) 84/76 - - - 76 18 100 % -   02/08/18 0330 96/73 - - - 75 24 100 % -   02/08/18 0315 101/79 - - - 80 25 100 % -   02/08/18 0305 142/79 - - - 87 16 98 % -   02/08/18 0300 - - - - 81 24 100 % -   02/08/18 0245 119/76 97.4  F (36.3  C) Temporal 77 - 20 99 % 102.1 kg (225 lb)     Physical Exam  Eyes:  Sclera white; Pupils are equal and round  ENT:    External ears and nares normal  CV:  Rate as above with regular rhythm   Resp:  Breath sounds clear and equal bilaterally    Non-labored, no retractions or accessory muscle use  GI:  Abdomen is soft, non-tender, non-distended    No rebound tenderness or peritoneal features  MS:  Moves all extremities  Skin:  Warm and dry; multiple superficial self inflicted  wounds to forearms  Neuro:  Speech is normal and fluent. No apparent deficit.        Emergency Department Course     ECG:  ECG taken at 0245, ECG read at 0246  Normal sinus rhythm  Incomplete right bundle branch block  Borderline ECG  Rate 79 bpm. DE interval 130. QRS duration 92. QT/QTc 410/470. P-R-T axes 61 67 56.    Imaging:  Radiology findings were communicated with the patient who voiced understanding of the findings.  XR Chest Port:  No acute abnormality.  Per Radiologist.     Laboratory:  Laboratory findings were communicated with the patient who voiced understanding of the findings.  Alcohol ethyl (collected at 0250): 0.14 (H)  CMP: ALT: 71 (H) o/w WNL (Creatinine 0.78)  CBC: AWNL. (WBC 9.2, HGB 15.0, )   Acetaminophen level: <2  Salicylate level: <2  Drug screen: Positive for Benzodiazepines and cocaine    Interventions:  0258 Normal Saline 1000 mL IV  0338 Normal Saline 1000 mL IV  0453 Normal Saline 1000 mL IV  0446 Valium, 10 mg, PO  0446 Atarax, 50 mg, PO  0639 Normal Saline 1000 mL IV     Emergency Department Course:  0234 I called Poison Control in anticipation of the patient's presentation, but was put on hold.    0240 The patient arrived here in the ED via EMS  I performed an exam of the patient as documented above.   0244 I asked the patient her father's name and birthday. His last name is Gerardo, but she is unsure on his birthday  EKG obtained in the ED, see results above.   IV was inserted and blood was drawn for laboratory testing, results above.  0250 I spoke with Poison Control regarding the patient's condition.  0334 I rechecked the patient's condition. She states she is feeling slightly short of breath, but would not like an inhaler as she thinks this will make her more anxious.   0355 I spoke with Dr. Rocha regarding this patient.  0440 I rechecked the patient's condition.   0530 I spoke with Poison Control regarding this patient.    0700 The patient was signed out to my  colleague working the morning shift.    Impression & Plan      Medical Decision Making:  Swetha Patterson is a 47 year old female who presents to the emergency department today for evaluation of an intentional overdose with combined alcohol intake. She has some unidentified pills and cannot identify everything she took in overdose. She is on multiple medications, but we know she took Lisinopril, possibly Diazepam, and her father's Losartan based on pill identification of the bottle. Poison Control was consulted and stated peak effect of lisinopril overdoses is between 6 and 7 hours which would be between 8 and 9AM and is known to cause hypotension, but typically mild and fluid responsive and recommended potassium should be rechecked at some point in her course as well. She remained alert and oriented several hours into her visit. Based on this and the fact that she is past the expected peak effect of the valium, I think it is reasonable to give her home Valium and hydroxyzine for the anxiety and difficulty sleeping that she is having. She has been very forthcoming with staff here that she took this intentional overdose and cut herself in self harm due to multiple stressors.  Evaluation for toxic co ingestions or dysrhythmia was negative. Specifically her tylenol and salicylate level were normal. Blood alcohol was elevated, and she was given fluids to help this clear. Potassium was rechecked in the department with no abnormality noted. She was noted to have some blood pressures in the 80s during her ED course. These were primarily while she was sleeping on her side and the blood pressure arm was elevated. This is a known reason to be a cause of positional lower blood pressure.  This was treated with additional IV fluids. She will continue to be monitored until 9 AM, at which point if she is otherwise stabilized and has no other further worsening she would be medically cleared and mental health placement can be  arranged.    Diagnosis:    ICD-10-CM    1. Overdose, intentional self-harm, initial encounter (H) T50.902A    2. Deliberate self-cutting Z72.89    3. Hypotension due to drugs I95.2        Disposition:   0700 The patient was signed out to my colleague working the morning shift.    Scribe Disclosure:  I, Mario Hinds, am serving as a scribe at 2:43 AM on 2/8/2018 to document services personally performed by No att. providers found, based on my observations and the provider's statements to me.    2/8/2018   [facility] EMERGENCY DEPARTMENT       Megan Mccormick MD  02/08/18 0729

## 2018-02-08 NOTE — ED NOTES
Presents via ambulance after taking report 100 lisinopril 20 mg tsblets, nunknown number of [id number], pt also reports taiking unknown number of her fathers cozaar and alcohol.  Pt also has self inflicted cuts to bilateral forearms.  Pt cooperative, alert and oriented, refusing to answer some questions.

## 2018-02-09 VITALS
TEMPERATURE: 98.4 F | OXYGEN SATURATION: 98 % | RESPIRATION RATE: 18 BRPM | HEART RATE: 77 BPM | BODY MASS INDEX: 36.22 KG/M2 | SYSTOLIC BLOOD PRESSURE: 111 MMHG | DIASTOLIC BLOOD PRESSURE: 64 MMHG | WEIGHT: 211 LBS

## 2018-02-09 LAB
ANION GAP SERPL CALCULATED.3IONS-SCNC: 7 MMOL/L (ref 3–14)
BUN SERPL-MCNC: 13 MG/DL (ref 7–30)
CALCIUM SERPL-MCNC: 7.7 MG/DL (ref 8.5–10.1)
CHLORIDE SERPL-SCNC: 114 MMOL/L (ref 94–109)
CO2 SERPL-SCNC: 20 MMOL/L (ref 20–32)
CREAT SERPL-MCNC: 0.71 MG/DL (ref 0.52–1.04)
ERYTHROCYTE [DISTWIDTH] IN BLOOD BY AUTOMATED COUNT: 15.1 % (ref 10–15)
GFR SERPL CREATININE-BSD FRML MDRD: 87 ML/MIN/1.7M2
GLUCOSE SERPL-MCNC: 98 MG/DL (ref 70–99)
HCT VFR BLD AUTO: 38.5 % (ref 35–47)
HGB BLD-MCNC: 12.1 G/DL (ref 11.7–15.7)
MCH RBC QN AUTO: 29.4 PG (ref 26.5–33)
MCHC RBC AUTO-ENTMCNC: 31.4 G/DL (ref 31.5–36.5)
MCV RBC AUTO: 93 FL (ref 78–100)
PLATELET # BLD AUTO: 280 10E9/L (ref 150–450)
POTASSIUM SERPL-SCNC: 4.3 MMOL/L (ref 3.4–5.3)
RBC # BLD AUTO: 4.12 10E12/L (ref 3.8–5.2)
SODIUM SERPL-SCNC: 141 MMOL/L (ref 133–144)
WBC # BLD AUTO: 6.7 10E9/L (ref 4–11)

## 2018-02-09 PROCEDURE — 80048 BASIC METABOLIC PNL TOTAL CA: CPT | Performed by: PHYSICIAN ASSISTANT

## 2018-02-09 PROCEDURE — A9270 NON-COVERED ITEM OR SERVICE: HCPCS | Mod: GY | Performed by: PSYCHIATRY & NEUROLOGY

## 2018-02-09 PROCEDURE — 25000128 H RX IP 250 OP 636: Performed by: INTERNAL MEDICINE

## 2018-02-09 PROCEDURE — 99239 HOSP IP/OBS DSCHRG MGMT >30: CPT | Performed by: INTERNAL MEDICINE

## 2018-02-09 PROCEDURE — A9270 NON-COVERED ITEM OR SERVICE: HCPCS | Mod: GY | Performed by: INTERNAL MEDICINE

## 2018-02-09 PROCEDURE — 40000914 ZZH STATISTIC SITTER, DAY HOURS

## 2018-02-09 PROCEDURE — 25000128 H RX IP 250 OP 636: Performed by: PHYSICIAN ASSISTANT

## 2018-02-09 PROCEDURE — 25000132 ZZH RX MED GY IP 250 OP 250 PS 637: Mod: GY | Performed by: PSYCHIATRY & NEUROLOGY

## 2018-02-09 PROCEDURE — 90686 IIV4 VACC NO PRSV 0.5 ML IM: CPT | Performed by: INTERNAL MEDICINE

## 2018-02-09 PROCEDURE — 25000125 ZZHC RX 250: Performed by: INTERNAL MEDICINE

## 2018-02-09 PROCEDURE — 40000275 ZZH STATISTIC RCP TIME EA 10 MIN

## 2018-02-09 PROCEDURE — 85027 COMPLETE CBC AUTOMATED: CPT | Performed by: PHYSICIAN ASSISTANT

## 2018-02-09 PROCEDURE — 94640 AIRWAY INHALATION TREATMENT: CPT | Mod: 76

## 2018-02-09 PROCEDURE — 94640 AIRWAY INHALATION TREATMENT: CPT

## 2018-02-09 PROCEDURE — 25000132 ZZH RX MED GY IP 250 OP 250 PS 637: Mod: GY | Performed by: INTERNAL MEDICINE

## 2018-02-09 PROCEDURE — 40000916 ZZH STATISTIC SITTER, NIGHT HOURS

## 2018-02-09 PROCEDURE — 36415 COLL VENOUS BLD VENIPUNCTURE: CPT | Performed by: PHYSICIAN ASSISTANT

## 2018-02-09 RX ORDER — CETIRIZINE HYDROCHLORIDE 10 MG/1
10 TABLET ORAL DAILY
Status: DISCONTINUED | OUTPATIENT
Start: 2018-02-09 | End: 2018-02-09 | Stop reason: HOSPADM

## 2018-02-09 RX ORDER — ALBUTEROL SULFATE 90 UG/1
2 AEROSOL, METERED RESPIRATORY (INHALATION) EVERY 4 HOURS PRN
Status: DISCONTINUED | OUTPATIENT
Start: 2018-02-09 | End: 2018-02-09 | Stop reason: HOSPADM

## 2018-02-09 RX ORDER — ATORVASTATIN CALCIUM 80 MG/1
80 TABLET, FILM COATED ORAL DAILY
Qty: 30 TABLET | Refills: 0 | Status: SHIPPED | OUTPATIENT
Start: 2018-02-09 | End: 2019-06-24

## 2018-02-09 RX ORDER — ONDANSETRON 4 MG/1
4-8 TABLET, FILM COATED ORAL EVERY 8 HOURS PRN
Status: DISCONTINUED | OUTPATIENT
Start: 2018-02-09 | End: 2018-02-09

## 2018-02-09 RX ORDER — ALBUTEROL SULFATE 90 UG/1
2 AEROSOL, METERED RESPIRATORY (INHALATION) EVERY 6 HOURS
Qty: 3 INHALER | Refills: 3 | Status: SHIPPED | OUTPATIENT
Start: 2018-02-09 | End: 2018-08-09

## 2018-02-09 RX ORDER — DOXYCYCLINE HYCLATE 100 MG
100 TABLET ORAL 2 TIMES DAILY
Qty: 20 TABLET | Refills: 0 | Status: SHIPPED | OUTPATIENT
Start: 2018-02-09 | End: 2018-06-28

## 2018-02-09 RX ORDER — BUDESONIDE AND FORMOTEROL FUMARATE DIHYDRATE 160; 4.5 UG/1; UG/1
2 AEROSOL RESPIRATORY (INHALATION) 2 TIMES DAILY
Status: DISCONTINUED | OUTPATIENT
Start: 2018-02-09 | End: 2018-02-09

## 2018-02-09 RX ORDER — VARENICLINE TARTRATE 1 MG/1
1 TABLET, FILM COATED ORAL 2 TIMES DAILY
Qty: 60 TABLET | Refills: 0 | Status: SHIPPED | OUTPATIENT
Start: 2018-02-09 | End: 2018-04-25

## 2018-02-09 RX ORDER — OXYCODONE HYDROCHLORIDE 5 MG/1
5 TABLET ORAL EVERY 6 HOURS PRN
Status: DISCONTINUED | OUTPATIENT
Start: 2018-02-09 | End: 2018-02-09 | Stop reason: HOSPADM

## 2018-02-09 RX ORDER — LITHIUM CARBONATE 150 MG/1
150 CAPSULE ORAL 2 TIMES DAILY WITH MEALS
Qty: 60 CAPSULE | Refills: 0 | Status: SHIPPED | OUTPATIENT
Start: 2018-02-09 | End: 2018-02-09

## 2018-02-09 RX ORDER — DOXEPIN HYDROCHLORIDE 10 MG/1
10 CAPSULE ORAL AT BEDTIME
Qty: 30 CAPSULE | Refills: 0 | Status: SHIPPED | OUTPATIENT
Start: 2018-02-09 | End: 2018-04-25

## 2018-02-09 RX ORDER — ALPRAZOLAM 1 MG
1 TABLET ORAL 3 TIMES DAILY PRN
Qty: 10 TABLET | Refills: 0 | Status: SHIPPED | OUTPATIENT
Start: 2018-02-09 | End: 2018-04-25

## 2018-02-09 RX ORDER — AMLODIPINE BESYLATE 10 MG/1
10 TABLET ORAL DAILY
Qty: 90 TABLET | Refills: 0 | Status: SHIPPED | OUTPATIENT
Start: 2018-02-09 | End: 2018-02-09

## 2018-02-09 RX ORDER — LISINOPRIL 20 MG/1
20 TABLET ORAL DAILY
Qty: 30 TABLET | Refills: 0 | Status: SHIPPED | OUTPATIENT
Start: 2018-02-09 | End: 2018-02-09

## 2018-02-09 RX ORDER — TIOTROPIUM BROMIDE 18 UG/1
CAPSULE ORAL; RESPIRATORY (INHALATION)
Qty: 30 CAPSULE | Refills: 1 | Status: SHIPPED | OUTPATIENT
Start: 2018-02-09 | End: 2018-04-25

## 2018-02-09 RX ORDER — MONTELUKAST SODIUM 10 MG/1
10 TABLET ORAL AT BEDTIME
Qty: 30 TABLET | Refills: 0 | Status: SHIPPED | OUTPATIENT
Start: 2018-02-09 | End: 2019-05-02

## 2018-02-09 RX ORDER — TIOTROPIUM BROMIDE 18 UG/1
18 CAPSULE ORAL; RESPIRATORY (INHALATION) DAILY
Status: DISCONTINUED | OUTPATIENT
Start: 2018-02-09 | End: 2018-02-09

## 2018-02-09 RX ORDER — DOXEPIN HYDROCHLORIDE 10 MG/1
10 CAPSULE ORAL AT BEDTIME
Status: DISCONTINUED | OUTPATIENT
Start: 2018-02-09 | End: 2018-02-09 | Stop reason: HOSPADM

## 2018-02-09 RX ORDER — LITHIUM CARBONATE 150 MG/1
150 CAPSULE ORAL 2 TIMES DAILY WITH MEALS
Status: DISCONTINUED | OUTPATIENT
Start: 2018-02-09 | End: 2018-02-09 | Stop reason: HOSPADM

## 2018-02-09 RX ORDER — HYDROXYZINE HYDROCHLORIDE 50 MG/1
50 TABLET, FILM COATED ORAL 4 TIMES DAILY PRN
Qty: 30 TABLET | Refills: 0 | Status: SHIPPED | OUTPATIENT
Start: 2018-02-09 | End: 2018-04-30

## 2018-02-09 RX ORDER — VARENICLINE TARTRATE 1 MG/1
1 TABLET, FILM COATED ORAL 2 TIMES DAILY
Status: DISCONTINUED | OUTPATIENT
Start: 2018-02-09 | End: 2018-02-09 | Stop reason: HOSPADM

## 2018-02-09 RX ORDER — HYDROXYZINE HYDROCHLORIDE 50 MG/1
50 TABLET, FILM COATED ORAL 4 TIMES DAILY PRN
Status: DISCONTINUED | OUTPATIENT
Start: 2018-02-09 | End: 2018-02-09 | Stop reason: HOSPADM

## 2018-02-09 RX ORDER — IPRATROPIUM BROMIDE AND ALBUTEROL SULFATE 2.5; .5 MG/3ML; MG/3ML
1 SOLUTION RESPIRATORY (INHALATION) EVERY 6 HOURS
Qty: 360 ML | Refills: 0 | Status: SHIPPED | OUTPATIENT
Start: 2018-02-09 | End: 2018-02-09

## 2018-02-09 RX ORDER — PROCHLORPERAZINE MALEATE 5 MG
5 TABLET ORAL EVERY 6 HOURS PRN
Qty: 15 TABLET | Refills: 0 | Status: SHIPPED | OUTPATIENT
Start: 2018-02-09 | End: 2018-04-25

## 2018-02-09 RX ORDER — ONDANSETRON 8 MG/1
4-8 TABLET, FILM COATED ORAL EVERY 8 HOURS PRN
Qty: 15 TABLET | Refills: 0 | Status: SHIPPED | OUTPATIENT
Start: 2018-02-09 | End: 2018-11-21

## 2018-02-09 RX ORDER — IPRATROPIUM BROMIDE AND ALBUTEROL SULFATE 2.5; .5 MG/3ML; MG/3ML
1 SOLUTION RESPIRATORY (INHALATION) EVERY 6 HOURS
Qty: 360 ML | Refills: 0 | Status: ON HOLD | OUTPATIENT
Start: 2018-02-09 | End: 2018-12-26

## 2018-02-09 RX ORDER — FLUTICASONE PROPIONATE 50 MCG
2 SPRAY, SUSPENSION (ML) NASAL DAILY
Status: DISCONTINUED | OUTPATIENT
Start: 2018-02-09 | End: 2018-02-09 | Stop reason: HOSPADM

## 2018-02-09 RX ORDER — FLUTICASONE PROPIONATE 50 MCG
2 SPRAY, SUSPENSION (ML) NASAL DAILY
Qty: 16 G | Refills: 1 | Status: SHIPPED | OUTPATIENT
Start: 2018-02-09 | End: 2018-04-25

## 2018-02-09 RX ORDER — LITHIUM CARBONATE 150 MG/1
150 CAPSULE ORAL 2 TIMES DAILY WITH MEALS
Qty: 30 CAPSULE | Refills: 0 | Status: SHIPPED | OUTPATIENT
Start: 2018-02-09 | End: 2018-04-25

## 2018-02-09 RX ORDER — FUROSEMIDE 20 MG
20 TABLET ORAL DAILY PRN
Qty: 30 TABLET | Refills: 0 | Status: SHIPPED | OUTPATIENT
Start: 2018-02-09 | End: 2019-06-11

## 2018-02-09 RX ORDER — MONTELUKAST SODIUM 10 MG/1
10 TABLET ORAL AT BEDTIME
Status: DISCONTINUED | OUTPATIENT
Start: 2018-02-09 | End: 2018-02-09 | Stop reason: HOSPADM

## 2018-02-09 RX ORDER — DESONIDE 0.5 MG/G
CREAM TOPICAL
Qty: 60 G | Refills: 0 | Status: SHIPPED | OUTPATIENT
Start: 2018-02-09 | End: 2018-02-09

## 2018-02-09 RX ORDER — ATORVASTATIN CALCIUM 40 MG/1
80 TABLET, FILM COATED ORAL DAILY
Status: DISCONTINUED | OUTPATIENT
Start: 2018-02-09 | End: 2018-02-09 | Stop reason: HOSPADM

## 2018-02-09 RX ORDER — PROCHLORPERAZINE MALEATE 5 MG
5 TABLET ORAL EVERY 6 HOURS PRN
Status: DISCONTINUED | OUTPATIENT
Start: 2018-02-09 | End: 2018-02-09 | Stop reason: HOSPADM

## 2018-02-09 RX ORDER — IPRATROPIUM BROMIDE AND ALBUTEROL SULFATE 2.5; .5 MG/3ML; MG/3ML
1 SOLUTION RESPIRATORY (INHALATION)
Status: DISCONTINUED | OUTPATIENT
Start: 2018-02-09 | End: 2018-02-09 | Stop reason: HOSPADM

## 2018-02-09 RX ORDER — BUDESONIDE AND FORMOTEROL FUMARATE DIHYDRATE 160; 4.5 UG/1; UG/1
2 AEROSOL RESPIRATORY (INHALATION) 2 TIMES DAILY
Qty: 3 INHALER | Refills: 1 | Status: SHIPPED | OUTPATIENT
Start: 2018-02-09 | End: 2019-09-10

## 2018-02-09 RX ORDER — ESCITALOPRAM OXALATE 20 MG/1
20 TABLET ORAL DAILY
Status: DISCONTINUED | OUTPATIENT
Start: 2018-02-09 | End: 2018-02-09

## 2018-02-09 RX ORDER — DIAZEPAM 5 MG
10 TABLET ORAL EVERY 8 HOURS PRN
Status: DISCONTINUED | OUTPATIENT
Start: 2018-02-09 | End: 2018-02-09 | Stop reason: HOSPADM

## 2018-02-09 RX ORDER — OXYCODONE HYDROCHLORIDE 10 MG/1
5 TABLET ORAL EVERY 6 HOURS PRN
Qty: 10 TABLET | Refills: 0 | Status: SHIPPED | OUTPATIENT
Start: 2018-02-09 | End: 2018-04-25

## 2018-02-09 RX ORDER — FLUCONAZOLE 150 MG/1
150 TABLET ORAL
Qty: 5 TABLET | Refills: 0 | Status: SHIPPED | OUTPATIENT
Start: 2018-02-09 | End: 2018-06-28

## 2018-02-09 RX ADMIN — HYDROXYZINE HYDROCHLORIDE 50 MG: 50 TABLET, FILM COATED ORAL at 16:02

## 2018-02-09 RX ADMIN — LITHIUM CARBONATE 150 MG: 150 CAPSULE, GELATIN COATED ORAL at 13:23

## 2018-02-09 RX ADMIN — DIAZEPAM 10 MG: 5 TABLET ORAL at 10:32

## 2018-02-09 RX ADMIN — HYDROXYZINE HYDROCHLORIDE 50 MG: 50 TABLET, FILM COATED ORAL at 10:32

## 2018-02-09 RX ADMIN — ATORVASTATIN CALCIUM 80 MG: 40 TABLET, FILM COATED ORAL at 10:32

## 2018-02-09 RX ADMIN — TIZANIDINE 4 MG: 4 TABLET ORAL at 10:37

## 2018-02-09 RX ADMIN — ESCITALOPRAM OXALATE 20 MG: 20 TABLET ORAL at 10:32

## 2018-02-09 RX ADMIN — IPRATROPIUM BROMIDE AND ALBUTEROL SULFATE 3 ML: .5; 3 SOLUTION RESPIRATORY (INHALATION) at 15:27

## 2018-02-09 RX ADMIN — OXYCODONE HYDROCHLORIDE 5 MG: 5 TABLET ORAL at 12:09

## 2018-02-09 RX ADMIN — FLUTICASONE PROPIONATE 2 SPRAY: 50 SPRAY, METERED NASAL at 12:04

## 2018-02-09 RX ADMIN — VARENICLINE TARTRATE 1 MG: 1 TABLET, FILM COATED ORAL at 12:03

## 2018-02-09 RX ADMIN — SODIUM CHLORIDE: 9 INJECTION, SOLUTION INTRAVENOUS at 05:46

## 2018-02-09 RX ADMIN — INFLUENZA A VIRUS A/MICHIGAN/45/2015 X-275 (H1N1) ANTIGEN (FORMALDEHYDE INACTIVATED), INFLUENZA A VIRUS A/HONG KONG/4801/2014 X-263B (H3N2) ANTIGEN (FORMALDEHYDE INACTIVATED), INFLUENZA B VIRUS B/PHUKET/3073/2013 ANTIGEN (FORMALDEHYDE INACTIVATED), AND INFLUENZA B VIRUS B/BRISBANE/60/2008 ANTIGEN (FORMALDEHYDE INACTIVATED) 0.5 ML: 15; 15; 15; 15 INJECTION, SUSPENSION INTRAMUSCULAR at 14:49

## 2018-02-09 RX ADMIN — IPRATROPIUM BROMIDE AND ALBUTEROL SULFATE 3 ML: .5; 3 SOLUTION RESPIRATORY (INHALATION) at 07:28

## 2018-02-09 RX ADMIN — OMEPRAZOLE 20 MG: 20 CAPSULE, DELAYED RELEASE ORAL at 10:32

## 2018-02-09 RX ADMIN — OMEPRAZOLE 20 MG: 20 CAPSULE, DELAYED RELEASE ORAL at 16:02

## 2018-02-09 RX ADMIN — CETIRIZINE HYDROCHLORIDE 10 MG: 10 TABLET, FILM COATED ORAL at 10:32

## 2018-02-09 ASSESSMENT — ACTIVITIES OF DAILY LIVING (ADL)
ADLS_ACUITY_SCORE: 13
ADLS_ACUITY_SCORE: 9.5
ADLS_ACUITY_SCORE: 12
ADLS_ACUITY_SCORE: 9.5
ADLS_ACUITY_SCORE: 12
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
## 2018-02-09 NOTE — PROGRESS NOTES
Pt without complaints. Was not interested in doing inpatient psych.  Dr. Kirby of psych has seen pt; feels pt OK to discharge to home from psych perspective when medically stable.  Denies suicidality to me.  Wants to change from valium to xanax (has taken in past and more effective).  States she has run out of all her meds and requesting prescriptions for everything she takes at home      Last 24 hours Vitals signs,imaging,microbiology;laboratory results were reviewed by me in EPIC  GENERAL:  Comfortable.  PSYCH: pleasant, oriented, No acute distress.  HEART:  Normal S1, S2 with no edema.  LUNGS:  Clear to auscultation, normal Respiratory effort.  ABDOMEN:  Soft, no hepatosplenomegaly, normal bowel sounds.  SKIN:  Dry to touch, No rash.     Last Basic Metabolic Panel:  Lab Results   Component Value Date     02/09/2018      Lab Results   Component Value Date    POTASSIUM 4.3 02/09/2018     Lab Results   Component Value Date    CHLORIDE 114 02/09/2018     Lab Results   Component Value Date    GENE 7.7 02/09/2018     Lab Results   Component Value Date    CO2 20 02/09/2018     Lab Results   Component Value Date    BUN 13 02/09/2018     Lab Results   Component Value Date    CR 0.71 02/09/2018     Lab Results   Component Value Date    GLC 98 02/09/2018          A/p:  Suicide attempt - resolved.  No longer suicidal.  Appreciate psych input.  Medically stable for discharge today - hypotension resolved and renal ftn remains normal after lisinopril overdose

## 2018-02-09 NOTE — CONSULTS
SWS:  SW consult to assist with inpt mental health placement. Pt seen by Psych today and pt has been cleared to return home with family. SW will complete consult.    ADDENDUM-  SW notified that pt has reached out to her [facility] Care Coordinator and denies any SW/CTS needs at this time.

## 2018-02-09 NOTE — PLAN OF CARE
RN- VSS, afeb. Alert and oriented. Cooperative with staff. Requesting pain meds and anti anxiety meds. Verbalizes understanding that she cannot have any narcs, benzos, or sleepers. Episode of nausea/vomiting. Relief with IV zofran. Up in room with SBA. 72 hour hold rights read to pt and copy given to pt. PSC at bedside. Continue current POC.

## 2018-02-09 NOTE — PROGRESS NOTES
Pt discharged to home, accompanied by family. Personal belongings sent with pt as well as DC Meds.. Stable condition at time of discharge.

## 2018-02-09 NOTE — CONSULTS
See dictation. #[id number]  Pskychiatry   Initial Consultation  Patient seen, spoke with sister. Patient agrees to sobriety, sister will  and clear home of alcohol. Patient to stayh voluntarily until medically stable.  Medication options reviewed.  She will restart lithium 150mg BID which worked better in the past.  No nsaid use,.No restart of lexapro.  Spoke with Haylee gillespie to restart services through [facility] and possible in home mh checks but contracts for safety and sobriety.  F/U with Milena Joseph NP with [facility] this week and to get lithium levels after clinic visit.  Begin counseling also at [facility]. Call prnadia Kirby MD  2/9/2018

## 2018-02-09 NOTE — PLAN OF CARE
"Problem: Patient Care Overview  Goal: Plan of Care/Patient Progress Review  Outcome: No Change  10:50  Text page sent to M.D. \"FYI:  Pt is becoming more anxious and restless.  She says she just wants to go home and not to inpatient psych.  She knows she's on a 72 hour hold, but thinks its expiring soon..  Also, she says she wants to talk with you again about the discharge plan.\"    MD returned call. Ordered a Psych consult.    11:00  Pain: Had intermittent \"lung\" pain.  PRN oxycodone given.  LOC: alert, but anxious.  PRN valium given.  Mobility: SBA  Lungs: clear.  Occas dry cough. 98% RA  Tele: SR  GI: Regular diet  : voiding without difficulty  IV: PIV saline locked  Other: Has (pre-existing) self-inflicted abrasions and scabs on forearms.  SW and Psych following.  Will have psych consult today.  Suicide precautions.  PSC at bedside.  PRN atarax and zanaflex given.    13:10  Psych MD saw pt.  States pt does not need inpatient psych and can d/c home when medically stable.  See note.  MD updated.    15:00  Pt verbalizes understanding of discharge instructions and medications.  Pt acknowledges receipt of all belongings.  She has her discharge medications.  She is waiting for a ride home from her sister this afternoon.  "

## 2018-02-10 NOTE — DISCHARGE SUMMARY
Admit Date:     02/08/2018   Discharge Date:     02/09/2018      PRINCIPAL FINAL DIAGNOSES:   1.  Intentional medication overdose with Lisinopril.   2.  Hypotension secondary to above.  The patient did not require ICU care or vasopressor medication this admission.      PAST MEDICAL HISTORY:   1.  Depression.   2.  Hypertension.   2.  COPD.   3.  Reflux disease.   4.  Hyperlipidemia.      PRINCIPAL PROCEDURES:    1.  Psychiatry consultation.   2.  Chest x-ray.   3.  Urine drug of abuse screen positive for benzodiazepines and cocaine.      REASON FOR ADMISSION:  Please see dictated history and physical by Dr. Shun Downs.  In brief, Ms. Patterson is a 47-year-old female with a history of depression.  She presented to the hospital after an intentional drug overdose.  The patient was hypotensive on presentation as she primarily to lisinopril for her overdose.      HOSPITAL COURSE:   The patient was hypotensive on presentation and remained so for the first part of her hospital stay, but this gradually resolved and she became normotensive.  She did not suffer any ill effects from her hypotension.  Should did not require ICU care or vasopressor medications.  Gradually improved while hospitalized.  Initial plans were for transfer to inpatient psychiatry service, but the patient reported she was no longer suicidal and did not want to go to inpatient psychiatry.  Therefore, we obtain a psychiatric consultation here in the hospital.  Dr. Kirby of psychiatry evaluated the patient and felt the patient was safe to discharge to home with follow-up psychiatric care and discharge from the hospital.  Dr. Kirby did recommend lithium in this patient which patient had taken previously.  Recommend not to restart Lexapro.      The patient was discharged to home on 02/09/2018.      DISCHARGE MEDICATIONS:   1.  Xanax 1 mg 3 times a day as needed for anxiety,  10 tablets given, no refills.  The patient had been taking Valium prior to admission but  reported that Xanax works better for her and she has had this in the past.  Therefore, she requested a prescription for this medication.  Further refills will be through her primary provider or her psychiatrist if they feel this is a medication worth continuing in this patient.   2.  Lithium 150 mg twice a day. new medication.   3.  Oxycodone 5 mg every 6 hours as needed for pain,  10 tablets given, no refills.  She obtains this chronically through her primary care provider.   4.  Zanaflex 4 mg 3 times a day as needed for muscle spasms, 10  tablets given, no refills.  Again, another chronic medication for her.   5.  Chantix 1 mg twice a day.   6.  Albuterol 2 puffs every 6 hours.   7.  Atorvastatin 80 mg daily.   8   Symbicort 2 puffs twice a day.   9.  Zyrtec 1 capsule daily.   10.  Doxepin 10 mg at bedtime.   11.  Doxycycline 100 mg twice a day as needed for rash flare-up.   12.  Fluconazole 150 mg every 3 days as needed for rash flareup.   13.  Lasix 20 mg daily, as needed.   14.  Hydroxyzine 50 mg 4 times a day as needed for anxiety.   15.  DuoNeb.   16.  Milk of Magnesia.   17.  Singular 10 mg at bedtime.   18.  Nicorette lozenges as needed for smoking cessation.   19.  Omeprazole 20 mg twice a day.   20.  Zofran as needed.   21.  Prochlorperazine 5 mg every 6 hours as needed for nausea.   22.  Spiriva 18 mcg inhaled daily.   23.  Vitamin D.      MEDICATIONS STOPPED:   1.  Include amlodipine and lisinopril in the setting of her hypotension on presentation.  Recommended she follow up with her primary care provider next week and have a blood pressure recheck.  If blood pressure is elevated, consider resuming these medications at that time.   2.  Valium was discontinued and replaced with Xanax per patient request.     3.  Lexapro was discontinued as recommended by psychiatry this admission.      The patient reports that she was out all of her medications and requested prescriptions for all of her home medicines on  discharge from the hospital.      FOLLOWUP INSTRUCTIONS:   1.  See primary MD next week.  Obtain refills of her medications through her primary provider.   2.  Follow up with [facility]ena [facility] nurse practitioner with [facility] this week and get lithium levels after that clinic visit.  Begin counseling through [facility].   3.  For now and do not take lisinopril, amlodipine as her blood pressure is not elevated this admission.  Recheck at the next clinic visit with primary provider. If blood pressure elevated at that visit may need to consider resuming these medications.      The patient was seen and examined on the day of discharge. I spent greater than 30 minutes discharging the patient form the hospital on the day of discharge.         COSMO FISHER MD             D: 02/10/2018   T: 02/10/2018   MT: WILLI      Name:     KIT MILLS   MRN:      [id number]        Account:        [id number]   :      1970           Admit Date:     2018                                  Discharge Date: 2018      Document: [id number]

## 2018-02-11 ENCOUNTER — TELEPHONE (OUTPATIENT)
Dept: INTERNAL MEDICINE | Facility: CLINIC | Age: 48
End: 2018-02-11

## 2018-02-11 NOTE — TELEPHONE ENCOUNTER
Please call the patient and then mail the letter    I received the recent hospital notes and the recent urine test that showed trace of cocaine.  This is a breach in the controlled contract you signed with the clinic.   I will not be able to continue your prescriptions for oxycodone, diazepam, xanax or similar medication. I will instruct you in tapering off these meds. Again I will not refill these prescriptions.      Please ask her how many tablets (oxycodone, Xanax, diazepam) she uses a day, so I can write the tapering schedule

## 2018-02-11 NOTE — LETTER
[facility]  [address], [address]  [place] [address]  [phone]        February 11, 2018    Swetha Patterson  [address]  [place] [address]            Dear MsAnge Patterson:    I received the recent hospital notes and the recent urine test that showed trace of cocaine.  This is a breach in the controlled contract you signed with the clinic.   I will not be able to continue your prescriptions for oxycodone, diazepam, xanax or similar medication. I will instruct you in tapering off these meds. Again I will not refill these prescriptions.    Sincerely,    Roro Low MD  Internal Medicine

## 2018-02-12 NOTE — TELEPHONE ENCOUNTER
Per hosp d/c summary recommendation Milena Joseph NP next week. Per [facility] last appt with Milena Joseph was 06/29/17 with no show 09/28/17. Milena's schedule is full for this week. Pt will need f/u 1 hour appt with Bety instead.    Called pt's phone number per epic, number is disconnected. Reviewed consent to communicate from 11/2017, pt has number listed as: [phone].     ED / Discharge Outreach Protocol    Patient Contact    Attempt # 1    Was call answered?  No.  Left message on voicemail with information to call me back.    See MDs note below to relay to pt.

## 2018-02-14 ENCOUNTER — TELEPHONE (OUTPATIENT)
Dept: INTERNAL MEDICINE | Facility: CLINIC | Age: 48
End: 2018-02-14

## 2018-02-15 NOTE — TELEPHONE ENCOUNTER
Call back from pt. States she has no idea what happened the night that she was admitted to the hospital. States she does not know what was in her system or anything that happened that night. States it is not fair that MD is cutting her off from her medications when she doesn't even know what happened. States other people break their contracts and get a second chance so why cant she. States she has an apt on Monday and will bring her care coordinator with to apt. Updated on MD message below. Routed to PCP as MARICEL.

## 2018-02-15 NOTE — TELEPHONE ENCOUNTER
Advised pt of breach of controlled substance contract and that PCP will not be prescribing any more of her controlled substance medication but would give instruction on how to wean off of them.  Patient would like instructions on how to do so.  She states she is no longer on Diazepam, only Xanax 1 mg three times daily and Oxycodone IR 10 mg tabs taking 0.5 tablets every 6 hours as needed.      Patient has an appt to see PCP Mon. 2/19.  Offered appt today but pt states she is meeting with her care coordinator today.  SERJIO Chandra R.N.

## 2018-02-15 NOTE — TELEPHONE ENCOUNTER
I am sorry I disappoint her.  I will not change my mind about controlled substance.  I will not talk about this on her next appointment    She can have a second chance with a different provider    She can discuss this with our clinic administrator    I think she will need appointment with Bety as well when she comes on Monday

## 2018-02-15 NOTE — TELEPHONE ENCOUNTER
Oxycodone 10 mg 0.5 mg   -- 3 times a day prn x 3 days  -- 2 times a day prn x 3 days  -- 1 time a day prn x 3 days  -- then stop

## 2018-02-16 NOTE — TELEPHONE ENCOUNTER
Received message: The subscriber you have dialed is not in service on 2 attempts.  SERJIO Chandra R.N.

## 2018-02-21 NOTE — TELEPHONE ENCOUNTER
Per epic pt has appt scheduled for 02/23 with Dr. Low.    Unable to reach pt at phone numbers listed. Called pt's sister (consent to communicate in epic), reached sister who states she can't get pt's phone number off her phone while on the phone. Made plan for clinic to call sister back in 5 minutes to get the number.    Called pt's sister back 5 minutes later, vm left for pt to call clinic back.

## 2018-02-22 NOTE — TELEPHONE ENCOUNTER
Pt calling back.  Advised of PCP's message below.    Also, using her father's phone temporarily until her phone gets fixed (sometime next week)--her dad's # [phone].

## 2018-02-26 ENCOUNTER — TELEPHONE (OUTPATIENT)
Dept: INTERNAL MEDICINE | Facility: CLINIC | Age: 48
End: 2018-02-26

## 2018-03-06 ENCOUNTER — TELEPHONE (OUTPATIENT)
Dept: INTERNAL MEDICINE | Facility: CLINIC | Age: 48
End: 2018-03-06

## 2018-03-06 NOTE — TELEPHONE ENCOUNTER
[place] [facility]- Face to Face Encounter for [place] Medical Assistance Patient Receiving Home Care-PCP OV 12/18/17    PCP's in-basket  Fax back

## 2018-03-08 NOTE — TELEPHONE ENCOUNTER
I did not order home care, I did not do face-to-face encounter to discuss home care with the patient  I will not signed the form

## 2018-04-26 ENCOUNTER — OFFICE VISIT (OUTPATIENT)
Dept: INTERNAL MEDICINE | Facility: CLINIC | Age: 48
End: 2018-04-26
Payer: MEDICARE

## 2018-04-26 VITALS
TEMPERATURE: 98 F | RESPIRATION RATE: 20 BRPM | DIASTOLIC BLOOD PRESSURE: 72 MMHG | OXYGEN SATURATION: 95 % | WEIGHT: 208 LBS | BODY MASS INDEX: 35.51 KG/M2 | HEART RATE: 70 BPM | SYSTOLIC BLOOD PRESSURE: 104 MMHG | HEIGHT: 64 IN

## 2018-04-26 DIAGNOSIS — J44.9 SEVERE CHRONIC OBSTRUCTIVE PULMONARY DISEASE (H): Chronic | ICD-10-CM

## 2018-04-26 DIAGNOSIS — R35.0 INCREASED FREQUENCY OF URINATION: ICD-10-CM

## 2018-04-26 DIAGNOSIS — E78.5 HYPERLIPIDEMIA LDL GOAL <130: Chronic | ICD-10-CM

## 2018-04-26 DIAGNOSIS — M54.2 CERVICALGIA: ICD-10-CM

## 2018-04-26 DIAGNOSIS — I10 ESSENTIAL HYPERTENSION WITH GOAL BLOOD PRESSURE LESS THAN 140/90: Chronic | ICD-10-CM

## 2018-04-26 DIAGNOSIS — Z01.818 PREOP GENERAL PHYSICAL EXAM: Primary | ICD-10-CM

## 2018-04-26 DIAGNOSIS — F41.9 ANXIETY: Chronic | ICD-10-CM

## 2018-04-26 DIAGNOSIS — K21.9 GASTROESOPHAGEAL REFLUX DISEASE WITHOUT ESOPHAGITIS: Chronic | ICD-10-CM

## 2018-04-26 LAB
ALBUMIN UR-MCNC: NEGATIVE MG/DL
ANION GAP SERPL CALCULATED.3IONS-SCNC: 5 MMOL/L (ref 3–14)
APPEARANCE UR: CLEAR
BILIRUB UR QL STRIP: NEGATIVE
BUN SERPL-MCNC: 10 MG/DL (ref 7–30)
CALCIUM SERPL-MCNC: 9 MG/DL (ref 8.5–10.1)
CHLORIDE SERPL-SCNC: 109 MMOL/L (ref 94–109)
CO2 SERPL-SCNC: 27 MMOL/L (ref 20–32)
COLOR UR AUTO: YELLOW
CREAT SERPL-MCNC: 0.62 MG/DL (ref 0.52–1.04)
GFR SERPL CREATININE-BSD FRML MDRD: >90 ML/MIN/1.7M2
GLUCOSE SERPL-MCNC: 93 MG/DL (ref 70–99)
GLUCOSE UR STRIP-MCNC: NEGATIVE MG/DL
HGB UR QL STRIP: NEGATIVE
KETONES UR STRIP-MCNC: NEGATIVE MG/DL
LEUKOCYTE ESTERASE UR QL STRIP: NEGATIVE
NITRATE UR QL: NEGATIVE
PH UR STRIP: 5 PH (ref 5–7)
POTASSIUM SERPL-SCNC: 4.4 MMOL/L (ref 3.4–5.3)
SODIUM SERPL-SCNC: 141 MMOL/L (ref 133–144)
SOURCE: NORMAL
SP GR UR STRIP: 1.01 (ref 1–1.03)
UROBILINOGEN UR STRIP-ACNC: 0.2 EU/DL (ref 0.2–1)

## 2018-04-26 PROCEDURE — 36415 COLL VENOUS BLD VENIPUNCTURE: CPT | Performed by: NURSE PRACTITIONER

## 2018-04-26 PROCEDURE — 81003 URINALYSIS AUTO W/O SCOPE: CPT | Performed by: NURSE PRACTITIONER

## 2018-04-26 PROCEDURE — 99215 OFFICE O/P EST HI 40 MIN: CPT | Performed by: NURSE PRACTITIONER

## 2018-04-26 PROCEDURE — 80048 BASIC METABOLIC PNL TOTAL CA: CPT | Performed by: NURSE PRACTITIONER

## 2018-04-26 RX ORDER — LISINOPRIL 20 MG/1
20 TABLET ORAL DAILY
Qty: 90 TABLET | Refills: 1 | Status: SHIPPED | OUTPATIENT
Start: 2018-04-26 | End: 2019-12-12

## 2018-04-26 ASSESSMENT — PATIENT HEALTH QUESTIONNAIRE - PHQ9
SUM OF ALL RESPONSES TO PHQ QUESTIONS 1-9: 4
SUM OF ALL RESPONSES TO PHQ QUESTIONS 1-9: 4
10. IF YOU CHECKED OFF ANY PROBLEMS, HOW DIFFICULT HAVE THESE PROBLEMS MADE IT FOR YOU TO DO YOUR WORK, TAKE CARE OF THINGS AT HOME, OR GET ALONG WITH OTHER PEOPLE: NOT DIFFICULT AT ALL

## 2018-04-26 ASSESSMENT — ANXIETY QUESTIONNAIRES
6. BECOMING EASILY ANNOYED OR IRRITABLE: NOT AT ALL
1. FEELING NERVOUS, ANXIOUS, OR ON EDGE: SEVERAL DAYS
5. BEING SO RESTLESS THAT IT IS HARD TO SIT STILL: SEVERAL DAYS
7. FEELING AFRAID AS IF SOMETHING AWFUL MIGHT HAPPEN: NOT AT ALL
3. WORRYING TOO MUCH ABOUT DIFFERENT THINGS: SEVERAL DAYS
GAD7 TOTAL SCORE: 4
2. NOT BEING ABLE TO STOP OR CONTROL WORRYING: NOT AT ALL
7. FEELING AFRAID AS IF SOMETHING AWFUL MIGHT HAPPEN: NOT AT ALL
GAD7 TOTAL SCORE: 4
4. TROUBLE RELAXING: SEVERAL DAYS
GAD7 TOTAL SCORE: 4

## 2018-04-26 NOTE — MR AVS SNAPSHOT
After Visit Summary   4/26/2018    Swetha Patterson    MRN: [id number]           Patient Information     Date Of Birth          1970        Visit Information        Provider Department      4/26/2018 10:40 AM Magaly Purcell APRN [facility]        Today's Diagnoses     Preop general physical exam    -  1    Cervicalgia        Essential hypertension with goal blood pressure less than 140/90        Hyperlipidemia LDL goal <130        Gastroesophageal reflux disease without esophagitis        Anxiety        Chr Lung Disease - managed by the pulm dept ([facility])^^^        Increased frequency of urination          Care Instructions    Day of surgery take morning of surgery    singulair   prilosec   Albuterol   Advair     Do not take lasix or lisinopril   morning of surgery     No aspirin ibuprofen or aleve products prior to surgery   May use tylenol products     Before Your Surgery      Call your surgeon if there is any change in your health. This includes signs of a cold or flu (such as a sore throat, runny nose, cough, rash or fever).    Do not smoke, drink alcohol or take over the counter medicine (unless your surgeon or primary care doctor tells you to) for the 24 hours before and after surgery.    If you take prescribed drugs: Follow your doctor s orders about which medicines to take and which to stop until after surgery.    Eating and drinking prior to surgery: follow the instructions from your surgeon    Take a shower or bath the night before surgery. Use the soap your surgeon gave you to gently clean your skin. If you do not have soap from your surgeon, use your regular soap. Do not shave or scrub the surgery site.  Wear clean pajamas and have clean sheets on your bed.           Follow-ups after your visit        Your next 10 appointments already scheduled     May 01, 2018   Procedure with Osmany Eric MD   [facility] (--)    [address]  [facility] [address]   [phone]              Future tests that were ordered for you today     Open Future Orders        Priority Expected Expires Ordered    Methicillin Resist/Sens S. aureus PCR Routine 2018            Who to contact     If you have questions or need follow up information about today's clinic visit or your schedule please contact [facility] directly at [phone].  Normal or non-critical lab and imaging results will be communicated to you by [employer], letter or phone within 4 business days after the clinic has received the results. If you do not hear from us within 7 days, please contact the clinic through [employer] or phone. If you have a critical or abnormal lab result, we will notify you by phone as soon as possible.  Submit refill requests through [employer] or call your pharmacy and they will forward the refill request to us. Please allow 3 business days for your refill to be completed.          Additional Information About Your Visit        [employer] Information     [employer] lets you send messages to your doctor, view your test results, renew your prescriptions, schedule appointments and more. To sign up, go to [email or web address] . Click on \"Log in\" on the left side of the screen, which will take you to the Welcome page. Then click on \"Sign up Now\" on the right side of the page.     You will be asked to enter the access code listed below, as well as some personal information. Please follow the directions to create your username and password.     Your access code is: [email or web address]  Expires: 5/10/2018  3:03 PM     Your access code will  in 90 days. If you need help or a new code, please call your [facility] or [phone].        Care EveryWhere ID     This is your Care EveryWhere ID. This could be used by other organizations to access your [facility] medical records  [id number]        Your Vitals Were     Pulse Temperature " "Respirations Height Pulse Oximetry BMI (Body Mass Index)    70 98  F (36.7  C) (Oral) 20 5' 4\" (1.626 m) 95% 35.7 kg/m2       Blood Pressure from Last 3 Encounters:   04/26/18 104/72   02/09/18 111/64   12/18/17 120/80    Weight from Last 3 Encounters:   04/26/18 208 lb (94.3 kg)   04/25/18 207 lb (93.9 kg)   02/08/18 211 lb (95.7 kg)              We Performed the Following     Basic metabolic panel     UA reflex to Microscopic and Culture          Today's Medication Changes          These changes are accurate as of 4/26/18 11:25 AM.  If you have any questions, ask your nurse or doctor.               Start taking these medicines.        Dose/Directions    lisinopril 20 MG tablet   Commonly known as:  PRINIVIL/ZESTRIL   Used for:  Essential hypertension with goal blood pressure less than 140/90   Started by:  Magaly Purcell APRN CNP        Dose:  20 mg   Take 1 tablet (20 mg) by mouth daily   Quantity:  90 tablet   Refills:  1            Where to get your medicines      These medications were sent to [facility] [place], [place] - [facility] [address] [facility].  [facility] [address] [facility] [facility] [address]     Phone:  [phone]     lisinopril 20 MG tablet                Primary Care Provider Office Phone # Fax #    Roro Janine Chawla [phone]       [address]  [facility] [address]        Equal Access to Services     [facility] AH: Hadii aad ku hadasho Soomaali, waaxda luqadaha, qaybta kaalmada adeegyada, waxay clark cowan . So [facility] [phone].    ATENCIÓN: Si habla español, tiene a vargas disposición servicios gratuitos de asistencia lingüística. Llame al [phone].    We comply with applicable federal civil rights laws and [place] laws. We do not discriminate on the basis of race, color, national origin, age, disability, sex, sexual orientation, or gender identity.            Thank you!     Thank you for choosing [facility]  for your care. Our goal is always to provide you with excellent care. Hearing back from our patients is one way we can continue to improve our services. Please take a few minutes to complete the written survey that you may receive in the mail after your visit with us. Thank you!             Your Updated Medication List - Protect others around you: Learn how to safely use, store and throw away your medicines at www.disposemymeds.org.          This list is accurate as of 4/26/18 11:25 AM.  Always use your most recent med list.                   Brand Name Dispense Instructions for use Diagnosis    albuterol 108 (90 Base) MCG/ACT Inhaler    VENTOLIN HFA    3 Inhaler    Inhale 2 puffs into the lungs every 6 hours    Shortness of breath       atorvastatin 80 MG tablet    LIPITOR    30 tablet    Take 1 tablet (80 mg) by mouth daily    Hyperlipidemia LDL goal <130       budesonide-formoterol 160-4.5 MCG/ACT Inhaler    SYMBICORT    3 Inhaler    Inhale 2 puffs into the lungs 2 times daily    Severe chronic obstructive pulmonary disease (H)       chlorhexidine 4 % liquid    HIBICLENS    946 mL    Wash ab, groin, thighs daily in shower DO NOT PUT ON FACE    Hidradenitis suppurativa       doxycycline 100 MG tablet    VIBRA-TABS    20 tablet    Take 1 tablet (100 mg) by mouth 2 times daily Has these on hand to use PRN for rash flare-up    Acute bronchitis, unspecified organism       fluconazole 150 MG tablet    DIFLUCAN    5 tablet    Take 1 tablet (150 mg) by mouth every 3 days Has this on hand to use PRN for rash flare-up    Candidiasis of vulva and vagina       furosemide 20 MG tablet    LASIX    30 tablet    Take 1 tablet (20 mg) by mouth daily as needed    Bilateral leg edema       hydrOXYzine 50 MG tablet    ATARAX    30 tablet    Take 1 tablet (50 mg) by mouth 4 times daily as needed for anxiety Increased dose. For anxiety    Anxiety       ipratropium - albuterol 0.5 mg/2.5 mg/3 mL 0.5-2.5 (3) MG/3ML neb  solution    DUONEB    360 mL    Take 1 vial (3 mLs) by nebulization every 6 hours    Pulmonary emphysema, unspecified emphysema type (H)       lisinopril 20 MG tablet    PRINIVIL/ZESTRIL    90 tablet    Take 1 tablet (20 mg) by mouth daily    Essential hypertension with goal blood pressure less than 140/90       montelukast 10 MG tablet    SINGULAIR    30 tablet    Take 1 tablet (10 mg) by mouth At Bedtime    Dust allergy, Severe chronic obstructive pulmonary disease (H)       omeprazole 20 MG CR capsule    priLOSEC    60 capsule    Take 1 capsule (20 mg) by mouth 2 times daily    Acute gastritis, presence of bleeding unspecified, unspecified gastritis type       ondansetron 8 MG tablet    ZOFRAN    15 tablet    Take 0.5-1 tablets (4-8 mg) by mouth every 8 hours as needed for nausea    Nausea       order for DME     1 Device    Equipment being ordered: Nebulizer machine Length of need-lifetime    Chronic lung disease, Cough, Severe persistent asthma with acute exacerbation       VITAMIN D (CHOLECALCIFEROL) PO      Take 2,000 Units by mouth daily

## 2018-04-26 NOTE — PATIENT INSTRUCTIONS
Day of surgery take morning of surgery    singulair   prilosec   Albuterol   Advair     Do not take lasix or lisinopril   morning of surgery     No aspirin ibuprofen or aleve products prior to surgery   May use tylenol products     Before Your Surgery      Call your surgeon if there is any change in your health. This includes signs of a cold or flu (such as a sore throat, runny nose, cough, rash or fever).    Do not smoke, drink alcohol or take over the counter medicine (unless your surgeon or primary care doctor tells you to) for the 24 hours before and after surgery.    If you take prescribed drugs: Follow your doctor s orders about which medicines to take and which to stop until after surgery.    Eating and drinking prior to surgery: follow the instructions from your surgeon    Take a shower or bath the night before surgery. Use the soap your surgeon gave you to gently clean your skin. If you do not have soap from your surgeon, use your regular soap. Do not shave or scrub the surgery site.  Wear clean pajamas and have clean sheets on your bed.

## 2018-04-26 NOTE — NURSING NOTE
"Chief Complaint   Patient presents with     Pre-Op Exam       Initial /72 (BP Location: Left arm, Patient Position: Sitting, Cuff Size: Adult Large)  Pulse 70  Temp 98  F (36.7  C) (Oral)  Resp 20  Ht 5' 4\" (1.626 m)  Wt 208 lb (94.3 kg)  SpO2 95%  BMI 35.7 kg/m2 Estimated body mass index is 35.7 kg/(m^2) as calculated from the following:    Height as of this encounter: 5' 4\" (1.626 m).    Weight as of this encounter: 208 lb (94.3 kg).  Medication Reconciliation: complete    "

## 2018-04-26 NOTE — PROGRESS NOTES
[place] [address]  [facility] [address]  [phone]  Dept: [phone]    PRE-OP EVALUATION:  Today's date: 2018    Swetha Patterson (: 1970) presents for pre-operative evaluation assessment as requested by Dr. Briones.  She requires evaluation and anesthesia risk assessment prior to undergoing surgery/procedure for treatment of disc .    Fax number for surgical facility:   Primary Physician: Roro Chawla  Type of Anesthesia Anticipated: General    Patient has a Health Care Directive or Living Will:  NO    Preop Questions 2018   Who is doing your surgery? dr briones   What are you having done? neck surgery   Date of Surgery/Procedure: may 1st    Facility or Hospital where procedure/surgery will be performed: [facility]   1.  Do you have a history of Heart attack, stroke, stent, coronary bypass surgery, or other heart surgery? No   2.  Do you ever have any pain or discomfort in your chest? No   3.  Do you have a history of  Heart Failure? No   4.   Are you troubled by shortness of breath when:  walking on a level surface, or up a slight hill, or at night? YES - lung disease - chronic    5.  Do you currently have a cold, bronchitis or other respiratory infection? No   6.  Do you have a cough, shortness of breath, or wheezing? No   7.  Do you sometimes get pains in the calves of your legs when you walk? No   8. Do you or anyone in your family have previous history of blood clots? No   9.  Do you or does anyone in your family have a serious bleeding problem such as prolonged bleeding following surgeries or cuts? No   10. Have you ever had problems with anemia or been told to take iron pills? No   11. Have you had any abnormal blood loss such as black, tarry or bloody stools, or abnormal vaginal bleeding? No   12. Have you ever had a blood transfusion? No   13. Have you or any of your relatives ever had problems with anesthesia? No   14. Do  you have sleep apnea, excessive snoring or daytime drowsiness? No   15. Do you have any prosthetic heart valves? No   16. Do you have prosthetic joints? No   17. Is there any chance that you may be pregnant? No         HPI:     HPI related to upcoming procedure: neck pain and migraines - pressing on nerves in neck   Needs to have cervical repair        See problem list for active medical problems.  Problems all longstanding and stable, except as noted/documented.  See ROS for pertinent symptoms related to these conditions.                                                                                                                                                          .    MEDICAL HISTORY:     Patient Active Problem List    Diagnosis Date Noted     Suicide attempt 02/08/2018     Priority: Medium     Intentional drug overdose (H) 02/08/2018     Priority: Medium     3 02/08/2018     Priority: Medium     Drug overdose 02/08/2018     Priority: Medium     Cervical HNP C5-6 11/09/2017     Priority: Medium     Opioid type dependence, continuous (H) 11/09/2017     Priority: Medium     Essential hypertension with goal blood pressure less than 140/90 11/01/2016     Priority: Medium     DIAZ (nonalcoholic steatohepatitis) 06/17/2016     Priority: Medium     Chr Lung Disease - managed by the pulm dept ([facility])^^^ 04/15/2016     Priority: Medium     Continuous opioid dependence (HCC) opioid for chr cough and chest pain ^^^^ 04/15/2016     Priority: Medium     Chr PAIN - Ctr SUBSTANCE AGREEMENT - SIGNED 10/27/2015     Priority: Medium     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) 10/19/2015     Priority: Medium     Anxiety 10/13/2015     Priority: Medium     Gastroesophageal reflux disease without esophagitis 10/13/2015     Priority: Medium     Hyperlipidemia LDL goal <130      Priority: Medium     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)      Priority: Medium     Family history of ischemic heart disease      Priority:  "Medium     Mediastinal cyst 04/12/2013     Priority: Medium      Past Medical History:   Diagnosis Date     Anxiety state, unspecified      Asthma      Chronic pain     back pain from cyst     Contact dermatitis and other eczema, due to unspecified cause      COPD (chronic obstructive pulmonary disease) (H)      Depressive disorder, not elsewhere classified      Emphysema with chronic bronchitis (H)      Esophageal reflux      Family history of ischemic heart disease      Gastro-oesophageal reflux disease      History of emphysema      Hoarseness      HTN, goal below 140/90     No cardilogist     Hyperlipidemia LDL goal <130      Liver disease     \"fatty liver\"     Other chronic pain     chest, from coughing     Pneumonia      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)      PONV (postoperative nausea and vomiting)      Past Surgical History:   Procedure Laterality Date     ARTHROSCOPY SHOULDER DECOMPRESSION Left 10/21/2015    Procedure: ARTHROSCOPY SHOULDER DECOMPRESSION;  Surgeon: Julien Milian MD;  Location: [facility] OR     BRONCHIAL THERMOPLASTY N/A 11/14/2014    Procedure: BRONCHIAL THERMOPLASTY;  Surgeon: Ward Whitaker MD;  Location: [facility] GI     BRONCHIAL THERMOPLASTY N/A 12/19/2014    Procedure: BRONCHIAL THERMOPLASTY;  Surgeon: Ward Whitaker MD;  Location: [facility] OR     BRONCHIAL THERMOPLASTY N/A 2/6/2015    Procedure: BRONCHIAL THERMOPLASTY;  Surgeon: Ward Whitaker MD;  Location: [facility] OR     C APPENDECTOMY  at age 17     EXCISE NODE MEDIASTINAL  4/26/2013    Procedure: EXCISE NODE MEDIASTINAL;;  Surgeon: Av Peña MD;  Location: [facility] OR     LAPAROSCOPIC CHOLECYSTECTOMY N/A 10/19/2017    Procedure: LAPAROSCOPIC CHOLECYSTECTOMY;  LAPAROSCOPIC CHOLECYSTECTOMY;  Surgeon: Ney Jerry MD;  Location: [facility] OR     THORACOSCOPY  4/26/2013    Procedure: THORACOSCOPY;  LEFT VIDEO ASSISTED THORACOSCOPY, RESECTION OF POSTERIOR MEDIASTINAL MASS;  Surgeon: Av Peña MD;  " Location:  OR     TONSILLECTOMY  as a kid     TONSILLECTOMY       Current Outpatient Prescriptions   Medication Sig Dispense Refill     albuterol (VENTOLIN HFA) 108 (90 BASE) MCG/ACT Inhaler Inhale 2 puffs into the lungs every 6 hours 3 Inhaler 3     atorvastatin (LIPITOR) 80 MG tablet Take 1 tablet (80 mg) by mouth daily 30 tablet 0     budesonide-formoterol (SYMBICORT) 160-4.5 MCG/ACT Inhaler Inhale 2 puffs into the lungs 2 times daily 3 Inhaler 1     chlorhexidine (HIBICLENS) 4 % external liquid Wash ab, groin, thighs daily in shower DO NOT PUT ON FACE 946 mL 3     doxycycline (VIBRA-TABS) 100 MG tablet Take 1 tablet (100 mg) by mouth 2 times daily Has these on hand to use PRN for rash flare-up 20 tablet 0     fluconazole (DIFLUCAN) 150 MG tablet Take 1 tablet (150 mg) by mouth every 3 days Has this on hand to use PRN for rash flare-up 5 tablet 0     furosemide (LASIX) 20 MG tablet Take 1 tablet (20 mg) by mouth daily as needed 30 tablet 0     hydrOXYzine (ATARAX) 50 MG tablet Take 1 tablet (50 mg) by mouth 4 times daily as needed for anxiety Increased dose. For anxiety 30 tablet 0     ipratropium - albuterol 0.5 mg/2.5 mg/3 mL (DUONEB) 0.5-2.5 (3) MG/3ML neb solution Take 1 vial (3 mLs) by nebulization every 6 hours 360 mL 0     lisinopril (PRINIVIL/ZESTRIL) 20 MG tablet Take 1 tablet (20 mg) by mouth daily 90 tablet 1     montelukast (SINGULAIR) 10 MG tablet Take 1 tablet (10 mg) by mouth At Bedtime 30 tablet 0     omeprazole (PRILOSEC) 20 MG CR capsule Take 1 capsule (20 mg) by mouth 2 times daily 60 capsule 0     ondansetron (ZOFRAN) 8 MG tablet Take 0.5-1 tablets (4-8 mg) by mouth every 8 hours as needed for nausea 15 tablet 0     order for DME Equipment being ordered: Nebulizer machine  Length of need-lifetime 1 Device 0     VITAMIN D, CHOLECALCIFEROL, PO Take 2,000 Units by mouth daily       OTC products: None, except as noted above  Takes aleve and ibuprofen and will try to stop prior to surgery 3 days  "prior      Allergies   Allergen Reactions     Codeine      vomiting     Hydrocodone Nausea and Vomiting     Sulfa Drugs      Nausea     Gabapentin Rash      Latex Allergy: NO    Social History   Substance Use Topics     Smoking status: Light Tobacco Smoker     Years: 30.00     Types: Cigarettes     Last attempt to quit: 9/1/2015     Smokeless tobacco: Never Used      Comment: off/on 2 cigs per day     Alcohol use No     History   Drug Use No       REVIEW OF SYSTEMS:   CONSTITUTIONAL: NEGATIVE for fever, chills, change in weight  INTEGUMENTARY/SKIN: NEGATIVE for worrisome rashes, moles or lesions  EYES: NEGATIVE for vision changes or irritation  ENT/MOUTH: NEGATIVE for ear, mouth and throat problems  RESP: NEGATIVE for significant cough or SOB  BREAST: NEGATIVE for masses, tenderness or discharge  CV: NEGATIVE for chest pain, palpitations or peripheral edema  GI: NEGATIVE for nausea, abdominal pain, heartburn, or change in bowel habits  : NEGATIVE for frequency, dysuria, or hematuria  MUSCULOSKELETAL:cervical issues - bone displaced and pressing in nerve   NEURO: NEGATIVE for weakness, dizziness or paresthesias  ENDOCRINE: NEGATIVE for temperature intolerance, skin/hair changes  HEME: NEGATIVE for bleeding problems  PSYCHIATRIC: NEGATIVE for changes in mood or affect    EXAM:   /72 (BP Location: Left arm, Patient Position: Sitting, Cuff Size: Adult Large)  Pulse 70  Temp 98  F (36.7  C) (Oral)  Resp 20  Ht 5' 4\" (1.626 m)  Wt 208 lb (94.3 kg)  SpO2 95%  BMI 35.7 kg/m2    GENERAL APPEARANCE:alert and no distress     HENT: ear canals and TM's normal and nose and mouth without ulcers or lesions     RESP: lungs clear to auscultation - no rales, rhonchi or wheezes     CV: regular rates and rhythm, normal S1 S2, no S3 or S4 and no murmur, click or rub     ABDOMEN:  soft, nontender, no HSM or masses and bowel sounds normal     MS: extremities normal- no gross deformities noted, no evidence of inflammation in " joints, FROM in all extremities.     SKIN: no suspicious lesions or rashes     NEURO: Normal strength and tone, sensory exam grossly normal, mentation intact and speech normal     PSYCH: mentation appears normal. and affect normal/bright    DIAGNOSTICS:   EKG: sinus with partial RBBB, unchanged from previous tracings     Recent Labs   Lab Test  02/09/18   0709  02/08/18   0635  02/08/18   0250   03/27/17   1131   04/26/13   0752   03/21/13   1002   HGB  12.1   --   15.0   < >  14.2   < >  13.7   < >   --    PLT  280   --   380   < >  320   < >  336   < >   --    INR   --    --    --    --   0.90   --   0.92   --    --    NA  141   --   137   < >  140   < >  137   < >   --    POTASSIUM  4.3  3.6  3.5   < >  4.4   < >  4.0   < >   --    CR  0.71   --   0.78   < >  0.70   < >  0.78   < >   --    A1C   --    --    --    --    --    --    --    --   5.4    < > = values in this interval not displayed.        IMPRESSION:   Reason for surgery/procedure: neck pain and nerve issue and migraine  Diagnosis/reason for consult: pre op     The proposed surgical procedure is considered INTERMEDIATE risk.    REVISED CARDIAC RISK INDEX  The patient has the following serious cardiovascular risks for perioperative complications such as (MI, PE, VFib and 3  AV Block):  No serious cardiac risks  INTERPRETATION: 2 risks: Class III (moderate risk - 6.6% complication rate)    The patient has the following additional risks for perioperative complications:  History abuse of opiiods       ICD-10-CM    1. Preop general physical exam Z01.818    2. Cervicalgia M54.2    3. Essential hypertension with goal blood pressure less than 140/90 I10 lisinopril (PRINIVIL/ZESTRIL) 20 MG tablet   4. Hyperlipidemia LDL goal <130 E78.5    5. Gastroesophageal reflux disease without esophagitis K21.9    6. Anxiety F41.9    7. Chr Lung Disease - managed by the pulm dept ([facility])^^^ J44.9        RECOMMENDATIONS:     --Consult hospital rounder / IM to assist post-op  medical management    --Patient is to take all scheduled medications on the day of surgery EXCEPT for modifications listed below.  Day of surgery take these medications   singulair   prilosec   Albuterol   Advair     Do not take lasix   Or lisinopril morning of surgery   APPROVAL GIVEN to proceed with proposed procedure, without further diagnostic evaluation as long as lab are reasonable        Signed Electronically by: LEIGH Long CNP    Copy of this evaluation report is provided to requesting physician.    [facility] Preop Guidelines    Revised Cardiac Risk IndexAnswers for HPI/ROS submitted by the patient on 4/26/2018   If you checked off any problems, how difficult have these problems made it for you to do your work, take care of things at home, or get along with other people?: Not difficult at all  PHQ9 TOTAL SCORE: 4  GIDEON 7 TOTAL SCORE: 4

## 2018-04-27 ASSESSMENT — ANXIETY QUESTIONNAIRES: GAD7 TOTAL SCORE: 4

## 2018-04-27 ASSESSMENT — PATIENT HEALTH QUESTIONNAIRE - PHQ9: SUM OF ALL RESPONSES TO PHQ QUESTIONS 1-9: 4

## 2018-04-30 DIAGNOSIS — F41.9 ANXIETY: ICD-10-CM

## 2018-04-30 NOTE — TELEPHONE ENCOUNTER
Pt calling for refill on Hydroxyzine 50mg tabs.  She was on 25mg tabs but was incr to 50mg tabs Feb 2018 from hosp provider.    Hydroxyzine 50mg tabs      Last Written Prescription Date:  2-9-18  Last Fill Quantity: 30,   # refills: 0  Last Office Visit: 4-26-18  Future Office visit:       Routing refill request to provider for review/approval because:  Recent refill for 50mg tabs was authorized by hosp provider.    Please advise, thanks.

## 2018-04-30 NOTE — H&P (VIEW-ONLY)
[place] [address]  [facility] [address]  [phone]  Dept: [phone]    PRE-OP EVALUATION:  Today's date: 2018    Swetha Patterson (: 1970) presents for pre-operative evaluation assessment as requested by Dr. Briones.  She requires evaluation and anesthesia risk assessment prior to undergoing surgery/procedure for treatment of disc .    Fax number for surgical facility:   Primary Physician: Roro Chawla  Type of Anesthesia Anticipated: General    Patient has a Health Care Directive or Living Will:  NO    Preop Questions 2018   Who is doing your surgery? dr briones   What are you having done? neck surgery   Date of Surgery/Procedure: may 1st    Facility or Hospital where procedure/surgery will be performed: [facility]   1.  Do you have a history of Heart attack, stroke, stent, coronary bypass surgery, or other heart surgery? No   2.  Do you ever have any pain or discomfort in your chest? No   3.  Do you have a history of  Heart Failure? No   4.   Are you troubled by shortness of breath when:  walking on a level surface, or up a slight hill, or at night? YES - lung disease - chronic    5.  Do you currently have a cold, bronchitis or other respiratory infection? No   6.  Do you have a cough, shortness of breath, or wheezing? No   7.  Do you sometimes get pains in the calves of your legs when you walk? No   8. Do you or anyone in your family have previous history of blood clots? No   9.  Do you or does anyone in your family have a serious bleeding problem such as prolonged bleeding following surgeries or cuts? No   10. Have you ever had problems with anemia or been told to take iron pills? No   11. Have you had any abnormal blood loss such as black, tarry or bloody stools, or abnormal vaginal bleeding? No   12. Have you ever had a blood transfusion? No   13. Have you or any of your relatives ever had problems with anesthesia? No   14. Do  you have sleep apnea, excessive snoring or daytime drowsiness? No   15. Do you have any prosthetic heart valves? No   16. Do you have prosthetic joints? No   17. Is there any chance that you may be pregnant? No         HPI:     HPI related to upcoming procedure: neck pain and migraines - pressing on nerves in neck   Needs to have cervical repair        See problem list for active medical problems.  Problems all longstanding and stable, except as noted/documented.  See ROS for pertinent symptoms related to these conditions.                                                                                                                                                          .    MEDICAL HISTORY:     Patient Active Problem List    Diagnosis Date Noted     Suicide attempt 02/08/2018     Priority: Medium     Intentional drug overdose (H) 02/08/2018     Priority: Medium     3 02/08/2018     Priority: Medium     Drug overdose 02/08/2018     Priority: Medium     Cervical HNP C5-6 11/09/2017     Priority: Medium     Opioid type dependence, continuous (H) 11/09/2017     Priority: Medium     Essential hypertension with goal blood pressure less than 140/90 11/01/2016     Priority: Medium     DIAZ (nonalcoholic steatohepatitis) 06/17/2016     Priority: Medium     Chr Lung Disease - managed by the pulm dept ([facility])^^^ 04/15/2016     Priority: Medium     Continuous opioid dependence (HCC) opioid for chr cough and chest pain ^^^^ 04/15/2016     Priority: Medium     Chr PAIN - Ctr SUBSTANCE AGREEMENT - SIGNED 10/27/2015     Priority: Medium     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) 10/19/2015     Priority: Medium     Anxiety 10/13/2015     Priority: Medium     Gastroesophageal reflux disease without esophagitis 10/13/2015     Priority: Medium     Hyperlipidemia LDL goal <130      Priority: Medium     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)      Priority: Medium     Family history of ischemic heart disease      Priority:  "Medium     Mediastinal cyst 04/12/2013     Priority: Medium      Past Medical History:   Diagnosis Date     Anxiety state, unspecified      Asthma      Chronic pain     back pain from cyst     Contact dermatitis and other eczema, due to unspecified cause      COPD (chronic obstructive pulmonary disease) (H)      Depressive disorder, not elsewhere classified      Emphysema with chronic bronchitis (H)      Esophageal reflux      Family history of ischemic heart disease      Gastro-oesophageal reflux disease      History of emphysema      Hoarseness      HTN, goal below 140/90     No cardilogist     Hyperlipidemia LDL goal <130      Liver disease     \"fatty liver\"     Other chronic pain     chest, from coughing     Pneumonia      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)      PONV (postoperative nausea and vomiting)      Past Surgical History:   Procedure Laterality Date     ARTHROSCOPY SHOULDER DECOMPRESSION Left 10/21/2015    Procedure: ARTHROSCOPY SHOULDER DECOMPRESSION;  Surgeon: Julien Milian MD;  Location: [facility] OR     BRONCHIAL THERMOPLASTY N/A 11/14/2014    Procedure: BRONCHIAL THERMOPLASTY;  Surgeon: Ward Whitaker MD;  Location: [facility] GI     BRONCHIAL THERMOPLASTY N/A 12/19/2014    Procedure: BRONCHIAL THERMOPLASTY;  Surgeon: Ward Whitaker MD;  Location: [facility] OR     BRONCHIAL THERMOPLASTY N/A 2/6/2015    Procedure: BRONCHIAL THERMOPLASTY;  Surgeon: Ward Whitaker MD;  Location: [facility] OR     C APPENDECTOMY  at age 17     EXCISE NODE MEDIASTINAL  4/26/2013    Procedure: EXCISE NODE MEDIASTINAL;;  Surgeon: Av Peña MD;  Location: [facility] OR     LAPAROSCOPIC CHOLECYSTECTOMY N/A 10/19/2017    Procedure: LAPAROSCOPIC CHOLECYSTECTOMY;  LAPAROSCOPIC CHOLECYSTECTOMY;  Surgeon: Ney Jerry MD;  Location: [facility] OR     THORACOSCOPY  4/26/2013    Procedure: THORACOSCOPY;  LEFT VIDEO ASSISTED THORACOSCOPY, RESECTION OF POSTERIOR MEDIASTINAL MASS;  Surgeon: Av Peña MD;  " Location:  OR     TONSILLECTOMY  as a kid     TONSILLECTOMY       Current Outpatient Prescriptions   Medication Sig Dispense Refill     albuterol (VENTOLIN HFA) 108 (90 BASE) MCG/ACT Inhaler Inhale 2 puffs into the lungs every 6 hours 3 Inhaler 3     atorvastatin (LIPITOR) 80 MG tablet Take 1 tablet (80 mg) by mouth daily 30 tablet 0     budesonide-formoterol (SYMBICORT) 160-4.5 MCG/ACT Inhaler Inhale 2 puffs into the lungs 2 times daily 3 Inhaler 1     chlorhexidine (HIBICLENS) 4 % external liquid Wash ab, groin, thighs daily in shower DO NOT PUT ON FACE 946 mL 3     doxycycline (VIBRA-TABS) 100 MG tablet Take 1 tablet (100 mg) by mouth 2 times daily Has these on hand to use PRN for rash flare-up 20 tablet 0     fluconazole (DIFLUCAN) 150 MG tablet Take 1 tablet (150 mg) by mouth every 3 days Has this on hand to use PRN for rash flare-up 5 tablet 0     furosemide (LASIX) 20 MG tablet Take 1 tablet (20 mg) by mouth daily as needed 30 tablet 0     hydrOXYzine (ATARAX) 50 MG tablet Take 1 tablet (50 mg) by mouth 4 times daily as needed for anxiety Increased dose. For anxiety 30 tablet 0     ipratropium - albuterol 0.5 mg/2.5 mg/3 mL (DUONEB) 0.5-2.5 (3) MG/3ML neb solution Take 1 vial (3 mLs) by nebulization every 6 hours 360 mL 0     lisinopril (PRINIVIL/ZESTRIL) 20 MG tablet Take 1 tablet (20 mg) by mouth daily 90 tablet 1     montelukast (SINGULAIR) 10 MG tablet Take 1 tablet (10 mg) by mouth At Bedtime 30 tablet 0     omeprazole (PRILOSEC) 20 MG CR capsule Take 1 capsule (20 mg) by mouth 2 times daily 60 capsule 0     ondansetron (ZOFRAN) 8 MG tablet Take 0.5-1 tablets (4-8 mg) by mouth every 8 hours as needed for nausea 15 tablet 0     order for DME Equipment being ordered: Nebulizer machine  Length of need-lifetime 1 Device 0     VITAMIN D, CHOLECALCIFEROL, PO Take 2,000 Units by mouth daily       OTC products: None, except as noted above  Takes aleve and ibuprofen and will try to stop prior to surgery 3 days  "prior      Allergies   Allergen Reactions     Codeine      vomiting     Hydrocodone Nausea and Vomiting     Sulfa Drugs      Nausea     Gabapentin Rash      Latex Allergy: NO    Social History   Substance Use Topics     Smoking status: Light Tobacco Smoker     Years: 30.00     Types: Cigarettes     Last attempt to quit: 9/1/2015     Smokeless tobacco: Never Used      Comment: off/on 2 cigs per day     Alcohol use No     History   Drug Use No       REVIEW OF SYSTEMS:   CONSTITUTIONAL: NEGATIVE for fever, chills, change in weight  INTEGUMENTARY/SKIN: NEGATIVE for worrisome rashes, moles or lesions  EYES: NEGATIVE for vision changes or irritation  ENT/MOUTH: NEGATIVE for ear, mouth and throat problems  RESP: NEGATIVE for significant cough or SOB  BREAST: NEGATIVE for masses, tenderness or discharge  CV: NEGATIVE for chest pain, palpitations or peripheral edema  GI: NEGATIVE for nausea, abdominal pain, heartburn, or change in bowel habits  : NEGATIVE for frequency, dysuria, or hematuria  MUSCULOSKELETAL:cervical issues - bone displaced and pressing in nerve   NEURO: NEGATIVE for weakness, dizziness or paresthesias  ENDOCRINE: NEGATIVE for temperature intolerance, skin/hair changes  HEME: NEGATIVE for bleeding problems  PSYCHIATRIC: NEGATIVE for changes in mood or affect    EXAM:   /72 (BP Location: Left arm, Patient Position: Sitting, Cuff Size: Adult Large)  Pulse 70  Temp 98  F (36.7  C) (Oral)  Resp 20  Ht 5' 4\" (1.626 m)  Wt 208 lb (94.3 kg)  SpO2 95%  BMI 35.7 kg/m2    GENERAL APPEARANCE:alert and no distress     HENT: ear canals and TM's normal and nose and mouth without ulcers or lesions     RESP: lungs clear to auscultation - no rales, rhonchi or wheezes     CV: regular rates and rhythm, normal S1 S2, no S3 or S4 and no murmur, click or rub     ABDOMEN:  soft, nontender, no HSM or masses and bowel sounds normal     MS: extremities normal- no gross deformities noted, no evidence of inflammation in " joints, FROM in all extremities.     SKIN: no suspicious lesions or rashes     NEURO: Normal strength and tone, sensory exam grossly normal, mentation intact and speech normal     PSYCH: mentation appears normal. and affect normal/bright    DIAGNOSTICS:   EKG: sinus with partial RBBB, unchanged from previous tracings     Recent Labs   Lab Test  02/09/18   0709  02/08/18   0635  02/08/18   0250   03/27/17   1131   04/26/13   0752   03/21/13   1002   HGB  12.1   --   15.0   < >  14.2   < >  13.7   < >   --    PLT  280   --   380   < >  320   < >  336   < >   --    INR   --    --    --    --   0.90   --   0.92   --    --    NA  141   --   137   < >  140   < >  137   < >   --    POTASSIUM  4.3  3.6  3.5   < >  4.4   < >  4.0   < >   --    CR  0.71   --   0.78   < >  0.70   < >  0.78   < >   --    A1C   --    --    --    --    --    --    --    --   5.4    < > = values in this interval not displayed.        IMPRESSION:   Reason for surgery/procedure: neck pain and nerve issue and migraine  Diagnosis/reason for consult: pre op     The proposed surgical procedure is considered INTERMEDIATE risk.    REVISED CARDIAC RISK INDEX  The patient has the following serious cardiovascular risks for perioperative complications such as (MI, PE, VFib and 3  AV Block):  No serious cardiac risks  INTERPRETATION: 2 risks: Class III (moderate risk - 6.6% complication rate)    The patient has the following additional risks for perioperative complications:  History abuse of opiiods       ICD-10-CM    1. Preop general physical exam Z01.818    2. Cervicalgia M54.2    3. Essential hypertension with goal blood pressure less than 140/90 I10 lisinopril (PRINIVIL/ZESTRIL) 20 MG tablet   4. Hyperlipidemia LDL goal <130 E78.5    5. Gastroesophageal reflux disease without esophagitis K21.9    6. Anxiety F41.9    7. Chr Lung Disease - managed by the pulm dept ([facility])^^^ J44.9        RECOMMENDATIONS:     --Consult hospital rounder / IM to assist post-op  medical management    --Patient is to take all scheduled medications on the day of surgery EXCEPT for modifications listed below.  Day of surgery take these medications   singulair   prilosec   Albuterol   Advair     Do not take lasix   Or lisinopril morning of surgery   APPROVAL GIVEN to proceed with proposed procedure, without further diagnostic evaluation as long as lab are reasonable        Signed Electronically by: LEIGH Long CNP    Copy of this evaluation report is provided to requesting physician.    [place] Preop Guidelines    Revised Cardiac Risk IndexAnswers for HPI/ROS submitted by the patient on 4/26/2018   If you checked off any problems, how difficult have these problems made it for you to do your work, take care of things at home, or get along with other people?: Not difficult at all  PHQ9 TOTAL SCORE: 4  GIDEON 7 TOTAL SCORE: 4

## 2018-05-01 ENCOUNTER — APPOINTMENT (OUTPATIENT)
Dept: GENERAL RADIOLOGY | Facility: CLINIC | Age: 48
End: 2018-05-01
Attending: ORTHOPAEDIC SURGERY
Payer: MEDICARE

## 2018-05-01 ENCOUNTER — HOSPITAL ENCOUNTER (OUTPATIENT)
Facility: CLINIC | Age: 48
Discharge: HOME | End: 2018-05-02
Attending: ORTHOPAEDIC SURGERY | Admitting: ORTHOPAEDIC SURGERY
Payer: MEDICARE

## 2018-05-01 ENCOUNTER — ANESTHESIA (OUTPATIENT)
Dept: SURGERY | Facility: CLINIC | Age: 48
End: 2018-05-01
Payer: MEDICARE

## 2018-05-01 ENCOUNTER — ANESTHESIA EVENT (OUTPATIENT)
Dept: SURGERY | Facility: CLINIC | Age: 48
End: 2018-05-01
Payer: MEDICARE

## 2018-05-01 DIAGNOSIS — Z98.1 S/P CERVICAL SPINAL FUSION: Primary | ICD-10-CM

## 2018-05-01 PROCEDURE — A9270 NON-COVERED ITEM OR SERVICE: HCPCS | Mod: GY | Performed by: ANESTHESIOLOGY

## 2018-05-01 PROCEDURE — 37000008 ZZH ANESTHESIA TECHNICAL FEE, 1ST 30 MIN: Performed by: ORTHOPAEDIC SURGERY

## 2018-05-01 PROCEDURE — 25000128 H RX IP 250 OP 636: Performed by: ORTHOPAEDIC SURGERY

## 2018-05-01 PROCEDURE — 25000125 ZZHC RX 250: Performed by: ORTHOPAEDIC SURGERY

## 2018-05-01 PROCEDURE — 36000071 ZZH SURGERY LEVEL 5 W FLUORO 1ST 30 MIN: Performed by: ORTHOPAEDIC SURGERY

## 2018-05-01 PROCEDURE — 25000125 ZZHC RX 250: Performed by: NURSE ANESTHETIST, CERTIFIED REGISTERED

## 2018-05-01 PROCEDURE — 25000566 ZZH SEVOFLURANE, EA 15 MIN: Performed by: ORTHOPAEDIC SURGERY

## 2018-05-01 PROCEDURE — 25000128 H RX IP 250 OP 636: Performed by: ANESTHESIOLOGY

## 2018-05-01 PROCEDURE — A9270 NON-COVERED ITEM OR SERVICE: HCPCS | Mod: GY | Performed by: ORTHOPAEDIC SURGERY

## 2018-05-01 PROCEDURE — 36000069 ZZH SURGERY LEVEL 5 EA 15 ADDTL MIN: Performed by: ORTHOPAEDIC SURGERY

## 2018-05-01 PROCEDURE — C1713 ANCHOR/SCREW BN/BN,TIS/BN: HCPCS | Performed by: ORTHOPAEDIC SURGERY

## 2018-05-01 PROCEDURE — 71000013 ZZH RECOVERY PHASE 1 LEVEL 1 EA ADDTL HR: Performed by: ORTHOPAEDIC SURGERY

## 2018-05-01 PROCEDURE — 25000132 ZZH RX MED GY IP 250 OP 250 PS 637: Mod: GY | Performed by: ANESTHESIOLOGY

## 2018-05-01 PROCEDURE — 25000132 ZZH RX MED GY IP 250 OP 250 PS 637: Mod: GY | Performed by: ORTHOPAEDIC SURGERY

## 2018-05-01 PROCEDURE — 40000277 XR SURGERY CARM FLUORO LESS THAN 5 MIN W STILLS: Mod: TC

## 2018-05-01 PROCEDURE — 27210794 ZZH OR GENERAL SUPPLY STERILE: Performed by: ORTHOPAEDIC SURGERY

## 2018-05-01 PROCEDURE — 40000306 ZZH STATISTIC PRE PROC ASSESS II: Performed by: ORTHOPAEDIC SURGERY

## 2018-05-01 PROCEDURE — 71000012 ZZH RECOVERY PHASE 1 LEVEL 1 FIRST HR: Performed by: ORTHOPAEDIC SURGERY

## 2018-05-01 PROCEDURE — C1762 CONN TISS, HUMAN(INC FASCIA): HCPCS | Performed by: ORTHOPAEDIC SURGERY

## 2018-05-01 PROCEDURE — 37000009 ZZH ANESTHESIA TECHNICAL FEE, EACH ADDTL 15 MIN: Performed by: ORTHOPAEDIC SURGERY

## 2018-05-01 PROCEDURE — 25000128 H RX IP 250 OP 636: Performed by: NURSE ANESTHETIST, CERTIFIED REGISTERED

## 2018-05-01 PROCEDURE — 27210995 ZZH RX 272: Performed by: ORTHOPAEDIC SURGERY

## 2018-05-01 DEVICE — GRAFT BONE CHIPS CANC 05ML: Type: IMPLANTABLE DEVICE | Site: SPINE CERVICAL | Status: FUNCTIONAL

## 2018-05-01 RX ORDER — ONDANSETRON 4 MG/1
4 TABLET, ORALLY DISINTEGRATING ORAL EVERY 6 HOURS PRN
Qty: 4 TABLET | Refills: 0 | Status: SHIPPED | OUTPATIENT
Start: 2018-05-01 | End: 2018-08-22 | Stop reason: DRUGHIGH

## 2018-05-01 RX ORDER — ACETAMINOPHEN 325 MG/1
650 TABLET ORAL EVERY 6 HOURS PRN
Status: DISCONTINUED | OUTPATIENT
Start: 2018-05-01 | End: 2018-05-02 | Stop reason: HOSPADM

## 2018-05-01 RX ORDER — NEOSTIGMINE METHYLSULFATE 1 MG/ML
VIAL (ML) INJECTION PRN
Status: DISCONTINUED | OUTPATIENT
Start: 2018-05-01 | End: 2018-05-01

## 2018-05-01 RX ORDER — HYDROMORPHONE HYDROCHLORIDE 1 MG/ML
0.2 INJECTION, SOLUTION INTRAMUSCULAR; INTRAVENOUS; SUBCUTANEOUS
Status: DISCONTINUED | OUTPATIENT
Start: 2018-05-01 | End: 2018-05-02 | Stop reason: HOSPADM

## 2018-05-01 RX ORDER — FENTANYL CITRATE 50 UG/ML
INJECTION, SOLUTION INTRAMUSCULAR; INTRAVENOUS PRN
Status: DISCONTINUED | OUTPATIENT
Start: 2018-05-01 | End: 2018-05-01

## 2018-05-01 RX ORDER — SODIUM CHLORIDE, SODIUM LACTATE, POTASSIUM CHLORIDE, CALCIUM CHLORIDE 600; 310; 30; 20 MG/100ML; MG/100ML; MG/100ML; MG/100ML
INJECTION, SOLUTION INTRAVENOUS CONTINUOUS
Status: DISCONTINUED | OUTPATIENT
Start: 2018-05-01 | End: 2018-05-01 | Stop reason: HOSPADM

## 2018-05-01 RX ORDER — ACETAMINOPHEN 325 MG/1
650 TABLET ORAL EVERY 6 HOURS PRN
Qty: 50 TABLET | Refills: 0 | Status: ON HOLD | OUTPATIENT
Start: 2018-05-01 | End: 2018-12-26

## 2018-05-01 RX ORDER — ONDANSETRON 4 MG/1
4 TABLET, ORALLY DISINTEGRATING ORAL EVERY 30 MIN PRN
Status: DISCONTINUED | OUTPATIENT
Start: 2018-05-01 | End: 2018-05-01 | Stop reason: HOSPADM

## 2018-05-01 RX ORDER — ONDANSETRON 2 MG/ML
4 INJECTION INTRAMUSCULAR; INTRAVENOUS EVERY 6 HOURS PRN
Status: DISCONTINUED | OUTPATIENT
Start: 2018-05-01 | End: 2018-05-02 | Stop reason: HOSPADM

## 2018-05-01 RX ORDER — BUPIVACAINE HYDROCHLORIDE AND EPINEPHRINE 2.5; 5 MG/ML; UG/ML
30 INJECTION, SOLUTION EPIDURAL; INFILTRATION; INTRACAUDAL; PERINEURAL ONCE
Status: COMPLETED | OUTPATIENT
Start: 2018-05-01 | End: 2018-05-01

## 2018-05-01 RX ORDER — NALOXONE HYDROCHLORIDE 0.4 MG/ML
.1-.4 INJECTION, SOLUTION INTRAMUSCULAR; INTRAVENOUS; SUBCUTANEOUS
Status: ACTIVE | OUTPATIENT
Start: 2018-05-01 | End: 2018-05-02

## 2018-05-01 RX ORDER — ONDANSETRON 2 MG/ML
INJECTION INTRAMUSCULAR; INTRAVENOUS PRN
Status: DISCONTINUED | OUTPATIENT
Start: 2018-05-01 | End: 2018-05-01

## 2018-05-01 RX ORDER — DEXAMETHASONE SODIUM PHOSPHATE 4 MG/ML
INJECTION, SOLUTION INTRA-ARTICULAR; INTRALESIONAL; INTRAMUSCULAR; INTRAVENOUS; SOFT TISSUE PRN
Status: DISCONTINUED | OUTPATIENT
Start: 2018-05-01 | End: 2018-05-01

## 2018-05-01 RX ORDER — CEFAZOLIN SODIUM 1 G/3ML
1 INJECTION, POWDER, FOR SOLUTION INTRAMUSCULAR; INTRAVENOUS SEE ADMIN INSTRUCTIONS
Status: DISCONTINUED | OUTPATIENT
Start: 2018-05-01 | End: 2018-05-01 | Stop reason: HOSPADM

## 2018-05-01 RX ORDER — FENTANYL CITRATE 50 UG/ML
25-50 INJECTION, SOLUTION INTRAMUSCULAR; INTRAVENOUS
Status: DISCONTINUED | OUTPATIENT
Start: 2018-05-01 | End: 2018-05-01 | Stop reason: HOSPADM

## 2018-05-01 RX ORDER — OXYCODONE HYDROCHLORIDE 5 MG/1
5-10 TABLET ORAL
Qty: 30 TABLET | Refills: 0 | Status: SHIPPED | OUTPATIENT
Start: 2018-05-01 | End: 2018-08-22

## 2018-05-01 RX ORDER — CEFAZOLIN SODIUM 2 G/100ML
2 INJECTION, SOLUTION INTRAVENOUS EVERY 8 HOURS
Status: COMPLETED | OUTPATIENT
Start: 2018-05-01 | End: 2018-05-02

## 2018-05-01 RX ORDER — HYDROMORPHONE HYDROCHLORIDE 1 MG/ML
.3-.5 INJECTION, SOLUTION INTRAMUSCULAR; INTRAVENOUS; SUBCUTANEOUS EVERY 5 MIN PRN
Status: DISCONTINUED | OUTPATIENT
Start: 2018-05-01 | End: 2018-05-01 | Stop reason: HOSPADM

## 2018-05-01 RX ORDER — NALOXONE HYDROCHLORIDE 0.4 MG/ML
.1-.4 INJECTION, SOLUTION INTRAMUSCULAR; INTRAVENOUS; SUBCUTANEOUS
Status: DISCONTINUED | OUTPATIENT
Start: 2018-05-01 | End: 2018-05-02 | Stop reason: HOSPADM

## 2018-05-01 RX ORDER — ATORVASTATIN CALCIUM 40 MG/1
80 TABLET, FILM COATED ORAL DAILY
Status: DISCONTINUED | OUTPATIENT
Start: 2018-05-01 | End: 2018-05-02 | Stop reason: HOSPADM

## 2018-05-01 RX ORDER — ONDANSETRON 2 MG/ML
4 INJECTION INTRAMUSCULAR; INTRAVENOUS EVERY 30 MIN PRN
Status: DISCONTINUED | OUTPATIENT
Start: 2018-05-01 | End: 2018-05-01 | Stop reason: HOSPADM

## 2018-05-01 RX ORDER — CEFAZOLIN SODIUM 1 G/50ML
1 INJECTION, SOLUTION INTRAVENOUS EVERY 8 HOURS
Status: DISCONTINUED | OUTPATIENT
Start: 2018-05-01 | End: 2018-05-01 | Stop reason: DRUGHIGH

## 2018-05-01 RX ORDER — HYDROXYZINE HYDROCHLORIDE 25 MG/1
25 TABLET, FILM COATED ORAL EVERY 6 HOURS PRN
Status: DISCONTINUED | OUTPATIENT
Start: 2018-05-01 | End: 2018-05-02 | Stop reason: HOSPADM

## 2018-05-01 RX ORDER — ALBUTEROL SULFATE 90 UG/1
AEROSOL, METERED RESPIRATORY (INHALATION) PRN
Status: DISCONTINUED | OUTPATIENT
Start: 2018-05-01 | End: 2018-05-01

## 2018-05-01 RX ORDER — LIDOCAINE HYDROCHLORIDE 10 MG/ML
INJECTION, SOLUTION INFILTRATION; PERINEURAL PRN
Status: DISCONTINUED | OUTPATIENT
Start: 2018-05-01 | End: 2018-05-01

## 2018-05-01 RX ORDER — GLYCOPYRROLATE 0.2 MG/ML
INJECTION, SOLUTION INTRAMUSCULAR; INTRAVENOUS PRN
Status: DISCONTINUED | OUTPATIENT
Start: 2018-05-01 | End: 2018-05-01

## 2018-05-01 RX ORDER — LISINOPRIL 20 MG/1
20 TABLET ORAL DAILY
Status: DISCONTINUED | OUTPATIENT
Start: 2018-05-01 | End: 2018-05-02 | Stop reason: HOSPADM

## 2018-05-01 RX ORDER — LIDOCAINE 40 MG/G
CREAM TOPICAL
Status: DISCONTINUED | OUTPATIENT
Start: 2018-05-01 | End: 2018-05-02

## 2018-05-01 RX ORDER — PROPOFOL 10 MG/ML
INJECTION, EMULSION INTRAVENOUS CONTINUOUS PRN
Status: DISCONTINUED | OUTPATIENT
Start: 2018-05-01 | End: 2018-05-01

## 2018-05-01 RX ORDER — OXYCODONE HYDROCHLORIDE 5 MG/1
5-10 TABLET ORAL
Status: DISCONTINUED | OUTPATIENT
Start: 2018-05-01 | End: 2018-05-02 | Stop reason: HOSPADM

## 2018-05-01 RX ORDER — LIDOCAINE 40 MG/G
CREAM TOPICAL
Status: DISCONTINUED | OUTPATIENT
Start: 2018-05-01 | End: 2018-05-01 | Stop reason: HOSPADM

## 2018-05-01 RX ORDER — ONDANSETRON 4 MG/1
4 TABLET, ORALLY DISINTEGRATING ORAL EVERY 6 HOURS PRN
Status: DISCONTINUED | OUTPATIENT
Start: 2018-05-01 | End: 2018-05-02 | Stop reason: HOSPADM

## 2018-05-01 RX ORDER — CEFAZOLIN SODIUM 2 G/100ML
2 INJECTION, SOLUTION INTRAVENOUS
Status: COMPLETED | OUTPATIENT
Start: 2018-05-01 | End: 2018-05-01

## 2018-05-01 RX ORDER — PROPOFOL 10 MG/ML
INJECTION, EMULSION INTRAVENOUS PRN
Status: DISCONTINUED | OUTPATIENT
Start: 2018-05-01 | End: 2018-05-01

## 2018-05-01 RX ADMIN — LIDOCAINE HYDROCHLORIDE 30 MG: 10 INJECTION, SOLUTION INFILTRATION; PERINEURAL at 08:20

## 2018-05-01 RX ADMIN — ATORVASTATIN CALCIUM 80 MG: 40 TABLET, FILM COATED ORAL at 16:25

## 2018-05-01 RX ADMIN — GLYCOPYRROLATE 0.6 MG: 0.2 INJECTION, SOLUTION INTRAMUSCULAR; INTRAVENOUS at 10:38

## 2018-05-01 RX ADMIN — MIDAZOLAM 2 MG: 1 INJECTION INTRAMUSCULAR; INTRAVENOUS at 11:53

## 2018-05-01 RX ADMIN — ROCURONIUM BROMIDE 40 MG: 10 INJECTION INTRAVENOUS at 08:20

## 2018-05-01 RX ADMIN — PROPOFOL 75 MCG/KG/MIN: 10 INJECTION, EMULSION INTRAVENOUS at 08:26

## 2018-05-01 RX ADMIN — OXYCODONE HYDROCHLORIDE 10 MG: 5 TABLET ORAL at 20:53

## 2018-05-01 RX ADMIN — HYDROMORPHONE HYDROCHLORIDE 0.5 MG: 1 INJECTION, SOLUTION INTRAMUSCULAR; INTRAVENOUS; SUBCUTANEOUS at 11:31

## 2018-05-01 RX ADMIN — HYDROMORPHONE HYDROCHLORIDE 0.5 MG: 1 INJECTION, SOLUTION INTRAMUSCULAR; INTRAVENOUS; SUBCUTANEOUS at 12:44

## 2018-05-01 RX ADMIN — GLYCOPYRROLATE 0.2 MG: 0.2 INJECTION, SOLUTION INTRAMUSCULAR; INTRAVENOUS at 08:20

## 2018-05-01 RX ADMIN — PROPOFOL: 10 INJECTION, EMULSION INTRAVENOUS at 09:45

## 2018-05-01 RX ADMIN — HYDROMORPHONE HYDROCHLORIDE 0.5 MG: 1 INJECTION, SOLUTION INTRAMUSCULAR; INTRAVENOUS; SUBCUTANEOUS at 11:20

## 2018-05-01 RX ADMIN — HYDROMORPHONE HYDROCHLORIDE 0.5 MG: 1 INJECTION, SOLUTION INTRAMUSCULAR; INTRAVENOUS; SUBCUTANEOUS at 09:58

## 2018-05-01 RX ADMIN — FENTANYL CITRATE 50 MCG: 50 INJECTION, SOLUTION INTRAMUSCULAR; INTRAVENOUS at 11:06

## 2018-05-01 RX ADMIN — DEXAMETHASONE SODIUM PHOSPHATE 4 MG: 4 INJECTION, SOLUTION INTRA-ARTICULAR; INTRALESIONAL; INTRAMUSCULAR; INTRAVENOUS; SOFT TISSUE at 08:20

## 2018-05-01 RX ADMIN — FENTANYL CITRATE 100 MCG: 50 INJECTION, SOLUTION INTRAMUSCULAR; INTRAVENOUS at 06:58

## 2018-05-01 RX ADMIN — FENTANYL CITRATE 50 MCG: 50 INJECTION, SOLUTION INTRAMUSCULAR; INTRAVENOUS at 12:34

## 2018-05-01 RX ADMIN — CEFAZOLIN 1 G: 1 INJECTION, POWDER, FOR SOLUTION INTRAMUSCULAR; INTRAVENOUS at 10:02

## 2018-05-01 RX ADMIN — OXYCODONE HYDROCHLORIDE 10 MG: 5 TABLET ORAL at 17:44

## 2018-05-01 RX ADMIN — ONDANSETRON 4 MG: 2 INJECTION INTRAMUSCULAR; INTRAVENOUS at 10:28

## 2018-05-01 RX ADMIN — ALBUTEROL SULFATE 2 PUFF: 90 AEROSOL, METERED RESPIRATORY (INHALATION) at 06:58

## 2018-05-01 RX ADMIN — FENTANYL CITRATE 50 MCG: 50 INJECTION, SOLUTION INTRAMUSCULAR; INTRAVENOUS at 11:18

## 2018-05-01 RX ADMIN — SODIUM CHLORIDE, POTASSIUM CHLORIDE, SODIUM LACTATE AND CALCIUM CHLORIDE: 600; 310; 30; 20 INJECTION, SOLUTION INTRAVENOUS at 08:48

## 2018-05-01 RX ADMIN — FENTANYL CITRATE 50 MCG: 50 INJECTION, SOLUTION INTRAMUSCULAR; INTRAVENOUS at 11:37

## 2018-05-01 RX ADMIN — SODIUM CHLORIDE, POTASSIUM CHLORIDE, SODIUM LACTATE AND CALCIUM CHLORIDE: 600; 310; 30; 20 INJECTION, SOLUTION INTRAVENOUS at 08:16

## 2018-05-01 RX ADMIN — LISINOPRIL 20 MG: 20 TABLET ORAL at 16:25

## 2018-05-01 RX ADMIN — HYDROXYZINE HYDROCHLORIDE 25 MG: 25 TABLET ORAL at 22:52

## 2018-05-01 RX ADMIN — MIDAZOLAM 2 MG: 1 INJECTION INTRAMUSCULAR; INTRAVENOUS at 07:51

## 2018-05-01 RX ADMIN — FENTANYL CITRATE 100 MCG: 50 INJECTION, SOLUTION INTRAMUSCULAR; INTRAVENOUS at 07:51

## 2018-05-01 RX ADMIN — MIDAZOLAM 2 MG: 1 INJECTION INTRAMUSCULAR; INTRAVENOUS at 08:16

## 2018-05-01 RX ADMIN — PHENYLEPHRINE HYDROCHLORIDE 100 MCG: 10 INJECTION, SOLUTION INTRAMUSCULAR; INTRAVENOUS; SUBCUTANEOUS at 08:33

## 2018-05-01 RX ADMIN — HYDROMORPHONE HYDROCHLORIDE 0.2 MG: 1 INJECTION, SOLUTION INTRAMUSCULAR; INTRAVENOUS; SUBCUTANEOUS at 15:29

## 2018-05-01 RX ADMIN — HYDROXYZINE HYDROCHLORIDE 25 MG: 25 TABLET ORAL at 16:25

## 2018-05-01 RX ADMIN — CEFAZOLIN SODIUM 2 G: 2 INJECTION, SOLUTION INTRAVENOUS at 17:59

## 2018-05-01 RX ADMIN — OXYCODONE HYDROCHLORIDE 10 MG: 5 TABLET ORAL at 14:30

## 2018-05-01 RX ADMIN — ROCURONIUM BROMIDE 10 MG: 10 INJECTION INTRAVENOUS at 08:56

## 2018-05-01 RX ADMIN — MIDAZOLAM 2 MG: 1 INJECTION INTRAMUSCULAR; INTRAVENOUS at 06:57

## 2018-05-01 RX ADMIN — SODIUM CHLORIDE, POTASSIUM CHLORIDE, SODIUM LACTATE AND CALCIUM CHLORIDE: 600; 310; 30; 20 INJECTION, SOLUTION INTRAVENOUS at 12:36

## 2018-05-01 RX ADMIN — FENTANYL CITRATE 200 MCG: 50 INJECTION, SOLUTION INTRAMUSCULAR; INTRAVENOUS at 08:20

## 2018-05-01 RX ADMIN — HYDROXYZINE HYDROCHLORIDE 25 MG: 25 TABLET ORAL at 11:53

## 2018-05-01 RX ADMIN — Medication 4 MG: at 10:38

## 2018-05-01 RX ADMIN — HYDROMORPHONE HYDROCHLORIDE 1 MG: 1 INJECTION, SOLUTION INTRAMUSCULAR; INTRAVENOUS; SUBCUTANEOUS at 08:56

## 2018-05-01 RX ADMIN — ACETAMINOPHEN 650 MG: 325 TABLET ORAL at 16:25

## 2018-05-01 RX ADMIN — CEFAZOLIN SODIUM 2 G: 2 INJECTION, SOLUTION INTRAVENOUS at 08:16

## 2018-05-01 RX ADMIN — ONDANSETRON 4 MG: 2 INJECTION INTRAMUSCULAR; INTRAVENOUS at 14:30

## 2018-05-01 RX ADMIN — PROPOFOL 200 MG: 10 INJECTION, EMULSION INTRAVENOUS at 08:20

## 2018-05-01 ASSESSMENT — COPD QUESTIONNAIRES
CAT_SEVERITY: MILD
COPD: 1

## 2018-05-01 ASSESSMENT — ENCOUNTER SYMPTOMS: DYSRHYTHMIAS: 0

## 2018-05-01 NOTE — ANESTHESIA PREPROCEDURE EVALUATION
Anesthesia Evaluation     . Pt has had prior anesthetic. Type: General    History of anesthetic complications   - PONV        ROS/MED HX    ENT/Pulmonary:     (+)Mild Persistent asthma Treatment: Inhaler daily,  mild COPD, , recent URI . .   (-) sleep apnea and Other pulmonary disease   Neurologic:     (+)neuropathy    (-) TIA, Other neuro hx and Dementia   Cardiovascular:     (+) Dyslipidemia, hypertension----. : . . . :. .      (-) CAD, CHF, arrhythmias and pulmonary hypertension   METS/Exercise Tolerance:     Hematologic:        (-) anemia   Musculoskeletal:   (+) arthritis, , , -       GI/Hepatic:     (+) GERD hepatitis type Other,       Renal/Genitourinary:      (-) renal disease   Endo:     (+) Obesity, .   (-) Type I DM, Type II DM, thyroid disease, chronic steroid usage and other endocrine disorder   Psychiatric:     (+) psychiatric history anxiety and depression      Infectious Disease:  - neg infectious disease ROS       Malignancy:         Other:    (+) H/O Chronic Pain,                   Physical Exam      Airway   Mallampati: II  TM distance: >3 FB  Neck ROM: full    Dental     Cardiovascular   Rhythm and rate: regular and normal  (-) no murmur    Pulmonary    breath sounds clear to auscultation    Other findings: Lab Test        02/09/18     02/08/18     10/21/17      --          03/27/17      --          04/26/13                       0709          0250          1220           --           1131           --           0752          WBC          6.7          9.2          9.8            < >        9.8            < >        10.9          HGB          12.1         15.0         13.9           < >        14.2           < >        13.7          MCV          93           90           90             < >        92             < >        88            PLT          280          380          353            < >        320            < >        336           INR           --           --           --           --           0.90          --          0.92           < > = values in this interval not displayed.                  Lab Test        04/26/18 02/09/18 02/08/18 02/08/18                       1130          0709          0635          0250          NA           141          141           --          137           POTASSIUM    4.4          4.3          3.6          3.5           CHLORIDE     109          114*          --          101           CO2          27           20            --          27            BUN          10           13            --          9             CR           0.62         0.71          --          0.78          ANIONGAP     5            7             --          9             GENE          9.0          7.7*          --          9.0           GLC          93           98            --          98                          Anesthesia Plan      History & Physical Review  History and physical reviewed and following examination; no interval change.    ASA Status:  3 .    NPO Status:  > 8 hours    Plan for General and ETT with Propofol induction. Maintenance will be Balanced.    PONV prophylaxis:  Ondansetron (or other 5HT-3) and Dexamethasone or Solumedrol  Additional equipment: Videolaryngoscope Propofol gtt + Sevo + 60% O2 please      Postoperative Care  Postoperative pain management:  IV analgesics and Oral pain medications.      Consents  Anesthetic plan, risks, benefits and alternatives discussed with:  Patient..                          .

## 2018-05-01 NOTE — PLAN OF CARE
Problem: Patient Care Overview  Goal: Plan of Care/Patient Progress Review  Outcome: No Change  Patient admitted from PACU for fusion cervical. Neck collar in place. A&Ox3. Drowsy and sleepy when arrived from PACU. Patient anxious. Discussed pain management prior to surgery and non pharmacological methods that has helped in the past. Offered distraction. Reported 9/10 pain. Given oxycodone 10mg. Zofran for nausea. Neuro checks WNL. Tolerating thin liquids.  Patient requested to remove carter catheter d/t discomfort and itching. No abnormalities noted to elizabeth area. Reported relief with removal. Capno-iP7-8. VSS

## 2018-05-01 NOTE — OP NOTE
Procedure Date: 05/01/2018      DATE OF SERVICE:  05/01/2018      PREOPERATIVE DIAGNOSES:     1.  C5-C6 degenerative disk disease with right-sided foraminal stenosis due to disk osteophyte complex.     2.  Right C6 radicular pain.        POSTOPERATIVE DIAGNOSES:     1.  C5-C6 degenerative disk disease with right-sided foraminal stenosis due to disk osteophyte complex.     2.  Right C6 radicular pain.       PROCEDURE:     1.  C5-C6 anterior cervical diskectomy and fusion.    2.  C5-C6 application of intervertebral biomechanical device for interbody fusion purposes.    3.  C5-C6 anterior instrumentation using the standard Kristin InViZia 24 mm plate with four associated bone screws, 12 mm in length.  Inferior screws are fixed.  Superior screws are variable.     4.  Use of local autograft bone mixed with crushed cancellous allograft bone for interbody fusion purposes.     5.  Fluoroscopy.    6.  Microscopy.        SURGEON:  Osmany Eric MD      ASSISTANT:  Nena Ramirez PA-C       ANESTHESIA:  General endotracheal without complications.      COMPLICATIONS:  None.      DRAINS:  One SUSAN drain placed out a separate hole and tied to the skin with a nylon stitch prior to closure.        ESTIMATED BLOOD LOSS:  Less than 25 mL, minimal.       FINDINGS:  Full diskectomy C5-C6 as planned.  Excellent instrument fixation.        OPERATIVE INDICATIONS:  Rukhsana is a pleasant 48-year-old female [facility] with longstanding right C6 radicular pain.  Imaging demonstrated a large right-sided C5-6 disk osteophyte complex consistent with her pain symptoms.  She failed conservative treatment over an acceptable period of time.  The above mentioned procedure was offered as a possible means to relieve or at least improve her right upper extremity symptoms.  We discussed the risks, benefits and alternatives to this type of procedure.  In the end, she elected to proceed with surgery.  Consent was obtained.   Preoperative H and P was performed.       DESCRIPTION OF PROCEDURE:  On the morning of surgery, she again was seen with ongoing symptoms.  She had signed consent to proceed with C5-6 ACDF and the above mentioned procedure components.  All questions were answered.  We proceeded to the OR after finding no contraindications to proceeding with surgery in the preoperative anesthesia assessment.   In the operating room, she was sedated and an ET tube was placed.  She was placed in the supine position in the standard fashion with a small bump underneath the scapula.  Subtle distraction were used with tape over the shoulders bilaterally and we did place a subtle distraction on the soft tissue of the anterior upper chest for visualization of the cervical spine anterior soft tissues.  Her head was well positioned on a soft padded tobias.  Her arms were protected with foam padding including the ulnar nerve and rested at her side.  The sheet was tucked at her side to stabilize the arms through the case.  The anterior neck region was prepped and draped in a standard fashion.  Timeout was performed and IV antibiotics were administered.        We then began by bringing in draped out fluoroscopy to identify the skin overlying the C5-C6 webspace.  This took a bit of extra time given the deep soft tissues we were dealing with, as well as her high riding shoulders.  Nevertheless, we did successfully and very confidently identify our C5-6 level and proceeded.  A left-sided incision was marked out.  This was infiltrated with Marcaine mixed with epinephrine.  A 10 blade was used to dissect down sharply through the skin into the subcutaneous tissues.  Bovie was used for hemostasis.  Lucila retractors were placed.  Metzenbaum scissors and an Adson was used to dissect down medial to the platysma musculature.  Blunt dissection was used following the Bryan-Danielle approach to the anterior cervical spine to identify the anterior annulus.  Deep  Cloward retractors were placed and we again verified our level with the twice-bent 25-gauge needle.  Following verification, we proceeded by placing our Trimline retractors under distraction.  The anterior disk was exposed with Bovie and suction.  Elburn pins were placed in C5 and C5 for distraction purposes.  Distraction was applied.  We then performed our C5-6 diskectomy with removal of anterior and posterior osteophytes.  A  high sped bur was used to take down the majority of the posterior osteophytes, especially inferior C5.  Our foraminotomies were performed bilaterally including takedown of the posterior longitudinal ligament for a full decompression to the entirety of the webspace.  Following a full decompression down to the bony endplates, we upsized trials to a 7 mm 5 degree lordotic implant.  Lateral fluoroscopy was used to verify its position which was noted to be excellent.  We then proceeded by packing a final Kristin Ethan PEEK cage 7 mm in height, 5 degrees lordosis, 12 x 14 mm with a combination of allograft and autograft bone.  This was then inserted into the disk under direct visual lateral fluoroscopy to a final nicely seated position.       We then moved on to anterior instrumentation.  In the end, we used a Kristin InViZia plate, 24 mm in height.  Four bone screws were placed in a standard fashion.  All four had an excellent end point and solid fixation.  The two gold flanges were rotated 90 degrees to prevent back-out of these four screws.  This essentially completed our procedure with full decompression without complications.  No CSF was noted and minimal blood loss was experienced.  The wound was copiously irrigated.  We obtained our final AP and lateral imaging.  A small SUSAN drain was placed directly onto the anterior surface of the plate and brought out a separate poke-hole next to the incision.  This was tied to the skin with a 3-0 nylon stitch.  The wound was then closed in layers  including the subcutaneous tissue and skin.  Dermabond was applied.  Drapes were taken down and a sterile dressing was applied.  She was then placed into a rigid cervical collar per routine.  She was then brought to hospital cart in a supine position and brought to the PACU in stable condition.         WEST MAHARAJ MD             D: 2018   T: 2018   MT: HODA      Name:     KIT MILLS   MRN:      [id number]        Account:        [id number]   :      1970           Procedure Date: 2018      Document: [id number]       cc: Layla Alcantar

## 2018-05-01 NOTE — IP AVS SNAPSHOT
MRN:[id number]                      After Visit Summary   5/1/2018    Swetha Patterson    MRN: [id number]           Thank you!     Thank you for choosing [facility] for your care. Our goal is always to provide you with excellent care. Hearing back from our patients is one way we can continue to improve our services. Please take a few minutes to complete the written survey that you may receive in the mail after you visit. If you would like to speak to someone directly about your visit please contact Patient Relations at [phone]. Thank you!          Patient Information     Date Of Birth          1970        About your hospital stay     You were admitted on:  May 1, 2018 You last received care in the:  [facility]    You were discharged on:  May 2, 2018       Who to Call     For medical emergencies, please call 911.  For non-urgent questions about your medical care, please call your primary care provider or clinic, [phone]  For questions related to your surgery, please call your surgery clinic        Attending Provider     Provider Osmany Covington MD Orthopedics       Primary Care Provider Office Phone # Fax #    Roro Janine Chawla [phone]      After Care Instructions     Discharge Instructions       Review outpatient procedure discharge instructions as directed by provider  Wear cervical collar at all times  Minimize lifting  No driving            Discharge Instructions - Diet as Tolerated       Return to diet before surgery, unless instructed otherwise.            Discharge Instructions - Lifting Limit (specify)       Lifting limit 10 pounds until seen at Post op follow up appointment            Ice to affected area       Ice pack to surgical site every 15 minutes per hour for 24 hours            No Alcohol       For 24 hours post procedure            No driving or operating machinery        Until follow up  appointment for 4 weeks            Notify Provider       Report Signs and symptoms of infection: Fever greater than 101 F, redness, swelling, heat at site, drainage,  or pus.            Remove dressing - 72 hours        Replace in 3 days with standard gauze  Keep dry in the shower            Return to clinic       Return to clinic in 10-14 days            Shower        Cover dressing if dressing is not going to be changed today            Wound care       Do not immerse wound in water until sutures removed                  Further instructions from your care team       Remove dressing on Saturday ( May 5th). Daily dressing change starting Saturday. Instruction: Apply gauze and secure with tape. Place water proof dressing when showering.   Remove scopolamine patch to left ear on Friday ( May 4th)      Call MD if any of these issues occur:  1) Increased/persistent redness,bleeding, localized warmth and swelling,drainage (yellow/clear/odorous) or tenderness at or incision site. Or incision pulling apart.  2) Increased pain not controlled with oral pain medications.sedation  3) Persistent headache, dizziness, lightheaded   4)Persistent constipation despite taking OTC stool softners as directed   5) calf pain/swollen/hard/warm area,swelling chest pain or shortness of breath  6)increased/persistent numbness or tingling in arms or legs, weakness in extremities or falls  7) Generalized feeling of illness.  8) persistent fever, chills, sweats temp >101  9) trouble voiding, incontinence of bowel and/or bladder  10), Difficulty swallowing liquids or foods, feel like things stick in throat. Or coughing with food or drink  11) if unable to wake call 911  Other instructions:  1) . Avoid touching incision with dressing changes  2)   3)no heavy lifting, nothing more then 6-10lbs. No bending, lifiting or twisting, no sitting for long periods of time. Follow physical therapy restrictions and exercises-Slowly increase  activity  4)  "avoid sitting or laying in one position too long, walk as tolerated, log roll  5) wear brace when up per physical therapy, inspect skin under brace daily and call md if sore area starts.  6) take an over the counter stool softener as directed while on narcotics to prevent constipation or to stay regular. Take a suppository or a laxative if no bowel movement in 2 days despite taking stool softener    Diet instructions:  Sit upright in chair for meals or at least 90 degree angle  Moisten mouth before eating or taking pills  Eat slowly and chew food well, alternate food and drink  Cut food up small  Eat soft foods, easily swallowed food, small frequent meals      Pending Results     No orders found for last 3 day(s).            Admission Information     Date & Time Provider Department Dept. Phone    2018 Jeaneth, Osmany CHAVEZ MD [facility] [phone]      Your Vitals Were     Blood Pressure Pulse Temperature Respirations Height Weight    121/53 (BP Location: Left arm) 65 96.5  F (35.8  C) (Oral) 16 1.626 m (5' 4\") 93.9 kg (207 lb)    Last Period Pulse Oximetry BMI (Body Mass Index)             04/15/2016 94% 35.53 kg/m2         MyChart Information     [employer] lets you send messages to your doctor, view your test results, renew your prescriptions, schedule appointments and more. To sign up, go to [email or web address] . Click on \"Log in\" on the left side of the screen, which will take you to the Welcome page. Then click on \"Sign up Now\" on the right side of the page.     You will be asked to enter the access code listed below, as well as some personal information. Please follow the directions to create your username and password.     Your access code is: [email or web address]  Expires: 5/10/2018  3:03 PM     Your access code will  in 90 days. If you need help or a new code, please call your [place] clinic or [phone].        Care EveryWhere ID     This is your Care EveryWhere ID. This could be used by " other organizations to access your [facility] medical records  [id number]        Equal Access to Services     CHARITY MANDUJANO : Ramon Jin, amanda kendrick, akhil horvath, caryl saunders. So [facility] [phone].    ATENCIÓN: Si habla español, tiene a vargas disposición servicios gratuitos de asistencia lingüística. Llame al [phone].    We comply with applicable federal civil rights laws and [place] laws. We do not discriminate on the basis of race, color, national origin, age, disability, sex, sexual orientation, or gender identity.               Review of your medicines      START taking        Dose / Directions    acetaminophen 325 MG tablet   Commonly known as:  TYLENOL   Used for:  S/P cervical spinal fusion        Dose:  650 mg   Take 2 tablets (650 mg) by mouth every 6 hours as needed for other (mild pain)   Quantity:  50 tablet   Refills:  0       ondansetron 4 MG ODT tab   Commonly known as:  ZOFRAN-ODT   Used for:  S/P cervical spinal fusion        Dose:  4 mg   Take 1 tablet (4 mg) by mouth every 6 hours as needed for nausea Dissolve ON the tongue.   Quantity:  4 tablet   Refills:  0       oxyCODONE IR 5 MG tablet   Commonly known as:  ROXICODONE   Used for:  S/P cervical spinal fusion        Dose:  5-10 mg   Take 1-2 tablets (5-10 mg) by mouth every 3 hours as needed for pain or other (Moderate to Severe)   Quantity:  30 tablet   Refills:  0         CONTINUE these medicines which have NOT CHANGED        Dose / Directions    albuterol 108 (90 Base) MCG/ACT Inhaler   Commonly known as:  VENTOLIN HFA   Used for:  Shortness of breath        Dose:  2 puff   Inhale 2 puffs into the lungs every 6 hours   Quantity:  3 Inhaler   Refills:  3       atorvastatin 80 MG tablet   Commonly known as:  LIPITOR   Used for:  Hyperlipidemia LDL goal <130        Dose:  80 mg   Take 1 tablet (80 mg) by mouth daily   Quantity:  30 tablet   Refills:  0        budesonide-formoterol 160-4.5 MCG/ACT Inhaler   Commonly known as:  SYMBICORT   Used for:  Severe chronic obstructive pulmonary disease (H)        Dose:  2 puff   Inhale 2 puffs into the lungs 2 times daily   Quantity:  3 Inhaler   Refills:  1       doxycycline 100 MG tablet   Commonly known as:  VIBRA-TABS   Used for:  Acute bronchitis, unspecified organism        Dose:  100 mg   Take 1 tablet (100 mg) by mouth 2 times daily Has these on hand to use PRN for rash flare-up   Quantity:  20 tablet   Refills:  0       fluconazole 150 MG tablet   Commonly known as:  DIFLUCAN   Used for:  Candidiasis of vulva and vagina        Dose:  150 mg   Take 1 tablet (150 mg) by mouth every 3 days Has this on hand to use PRN for rash flare-up   Quantity:  5 tablet   Refills:  0       furosemide 20 MG tablet   Commonly known as:  LASIX   Used for:  Bilateral leg edema        Dose:  20 mg   Take 1 tablet (20 mg) by mouth daily as needed   Quantity:  30 tablet   Refills:  0       hydrOXYzine 50 MG tablet   Commonly known as:  ATARAX   Used for:  Anxiety        Dose:  50 mg   Take 1 tablet (50 mg) by mouth 4 times daily as needed for anxiety Increased dose. For anxiety   Quantity:  30 tablet   Refills:  0       ipratropium - albuterol 0.5 mg/2.5 mg/3 mL 0.5-2.5 (3) MG/3ML neb solution   Commonly known as:  DUONEB   Used for:  Pulmonary emphysema, unspecified emphysema type (H)        Dose:  1 vial   Take 1 vial (3 mLs) by nebulization every 6 hours   Quantity:  360 mL   Refills:  0       lisinopril 20 MG tablet   Commonly known as:  PRINIVIL/ZESTRIL   Used for:  Essential hypertension with goal blood pressure less than 140/90        Dose:  20 mg   Take 1 tablet (20 mg) by mouth daily   Quantity:  90 tablet   Refills:  1       montelukast 10 MG tablet   Commonly known as:  SINGULAIR   Used for:  Dust allergy, Severe chronic obstructive pulmonary disease (H)        Dose:  10 mg   Take 1 tablet (10 mg) by mouth At Bedtime   Quantity:  30  tablet   Refills:  0       omeprazole 20 MG CR capsule   Commonly known as:  priLOSEC   Used for:  Acute gastritis, presence of bleeding unspecified, unspecified gastritis type        Dose:  20 mg   Take 1 capsule (20 mg) by mouth 2 times daily   Quantity:  60 capsule   Refills:  0       ondansetron 8 MG tablet   Commonly known as:  ZOFRAN   Used for:  Nausea        Dose:  4-8 mg   Take 0.5-1 tablets (4-8 mg) by mouth every 8 hours as needed for nausea   Quantity:  15 tablet   Refills:  0       order for DME   Used for:  Chronic lung disease, Cough, Severe persistent asthma with acute exacerbation        Equipment being ordered: Nebulizer machine Length of need-lifetime   Quantity:  1 Device   Refills:  0       VITAMIN D (CHOLECALCIFEROL) PO        Dose:  2000 Units   Take 2,000 Units by mouth daily   Refills:  0         STOP taking     chlorhexidine 4 % liquid   Commonly known as:  HIBICLENS                Where to get your medicines      Some of these will need a paper prescription and others can be bought over the counter. Ask your nurse if you have questions.     Bring a paper prescription for each of these medications     acetaminophen 325 MG tablet    ondansetron 4 MG ODT tab    oxyCODONE IR 5 MG tablet                Protect others around you: Learn how to safely use, store and throw away your medicines at www.disposemymeds.org.        Information about OPIOIDS     PRESCRIPTION OPIOIDS: WHAT YOU NEED TO KNOW   You have a prescription for an opioid (narcotic) pain medicine. Opioids can cause addiction. If you have a history of chemical dependency of any type, you are at a higher risk of becoming addicted to opioids. Only take this medicine after all other options have been tried. Take it for as short a time and as few doses as possible.     Do not:    Drive. If you drive while taking these medicines, you could be arrested for driving under the influence (DUI).    Operate heavy machinery    Do any other  dangerous activities while taking these medicines.     Drink any alcohol while taking these medicines.      Take with any other medicines that contain acetaminophen. Read all labels carefully. Look for the word  acetaminophen  or  Tylenol.  Ask your pharmacist if you have questions or are unsure.    Store your pills in a secure place, locked if possible. We will not replace any lost or stolen medicine. If you don t finish your medicine, please throw away (dispose) as directed by your pharmacist. The [place] Pollution Control Agency has more information about safe disposal: https://www.pca.[facility].us/living-green/managing-unwanted-medications    All opioids tend to cause constipation. Drink plenty of water and eat foods that have a lot of fiber, such as fruits, vegetables, prune juice, apple juice and high-fiber cereal. Take a laxative (Miralax, milk of magnesia, Colace, Senna) if you don t move your bowels at least every other day.              Medication List: This is a list of all your medications and when to take them. Check marks below indicate your daily home schedule. Keep this list as a reference.      Medications           Morning Afternoon Evening Bedtime As Needed    acetaminophen 325 MG tablet   Commonly known as:  TYLENOL   Take 2 tablets (650 mg) by mouth every 6 hours as needed for other (mild pain)   Last time this was given:  650 mg on 5/2/2018 11:21 AM   Next Dose Due:  Can have after 415pm                                     albuterol 108 (90 Base) MCG/ACT Inhaler   Commonly known as:  VENTOLIN HFA   Inhale 2 puffs into the lungs every 6 hours   Last time this was given:  2 puffs on 5/1/2018  6:58 AM                                   atorvastatin 80 MG tablet   Commonly known as:  LIPITOR   Take 1 tablet (80 mg) by mouth daily   Last time this was given:  80 mg on 5/2/2018  8:03 AM   Next Dose Due:  Thursday                                     budesonide-formoterol 160-4.5 MCG/ACT Inhaler    Commonly known as:  SYMBICORT   Inhale 2 puffs into the lungs 2 times daily                                      doxycycline 100 MG tablet   Commonly known as:  VIBRA-TABS   Take 1 tablet (100 mg) by mouth 2 times daily Has these on hand to use PRN for rash flare-up                                      fluconazole 150 MG tablet   Commonly known as:  DIFLUCAN   Take 1 tablet (150 mg) by mouth every 3 days Has this on hand to use PRN for rash flare-up                                   furosemide 20 MG tablet   Commonly known as:  LASIX   Take 1 tablet (20 mg) by mouth daily as needed                                   hydrOXYzine 50 MG tablet   Commonly known as:  ATARAX   Take 1 tablet (50 mg) by mouth 4 times daily as needed for anxiety Increased dose. For anxiety   Last time this was given:  25 mg on 5/2/2018  8:03 AM   Next Dose Due:  Can have after 2pm                                  ipratropium - albuterol 0.5 mg/2.5 mg/3 mL 0.5-2.5 (3) MG/3ML neb solution   Commonly known as:  DUONEB   Take 1 vial (3 mLs) by nebulization every 6 hours                                   lisinopril 20 MG tablet   Commonly known as:  PRINIVIL/ZESTRIL   Take 1 tablet (20 mg) by mouth daily   Last time this was given:  20 mg on 5/2/2018  8:03 AM                                   montelukast 10 MG tablet   Commonly known as:  SINGULAIR   Take 1 tablet (10 mg) by mouth At Bedtime                                   omeprazole 20 MG CR capsule   Commonly known as:  priLOSEC   Take 1 capsule (20 mg) by mouth 2 times daily                                      ondansetron 4 MG ODT tab   Commonly known as:  ZOFRAN-ODT   Take 1 tablet (4 mg) by mouth every 6 hours as needed for nausea Dissolve ON the tongue.                                   ondansetron 8 MG tablet   Commonly known as:  ZOFRAN   Take 0.5-1 tablets (4-8 mg) by mouth every 8 hours as needed for nausea                                   order for DME   Equipment being ordered:  Nebulizer machine Length of need-lifetime                                oxyCODONE IR 5 MG tablet   Commonly known as:  ROXICODONE   Take 1-2 tablets (5-10 mg) by mouth every 3 hours as needed for pain or other (Moderate to Severe)   Last time this was given:  10 mg on 5/2/2018 11:17 AM   Next Dose Due:  Can have after 230pm                                     VITAMIN D (CHOLECALCIFEROL) PO   Take 2,000 Units by mouth daily   Next Dose Due:  thursday

## 2018-05-01 NOTE — ANESTHESIA POSTPROCEDURE EVALUATION
Patient: Swetha Patterson    Procedure(s):  C5-6 Anterior cervical discectomy and fusion with possible structural or crushed allograft bone  PEEK interbody biochemical device, harvested Iliac crest or local autograft bone graft, Anterior instrumented fusion - Wound Class: I-Clean    Diagnosis:DDD, radiculopathy and stenosis  Diagnosis Additional Information: Pre-operative diagnosis: DDD, radiculopathy and stenosis C56  Post-operative diagnosis * No post-op diagnosis entered *  same  Procedure: Procedure(s):  C5-6 Anterior cervical discectomy and fusion with possible structural or crushed allograft bone  , PEEK interbody biochemical device, harvested Iliac crest or local autograft bone graft, Anterior instrumented fusion - Wound Class: I-Clean        Anesthesia Type:  General, ETT    Note:  Anesthesia Post Evaluation    Patient location during evaluation: PACU  Patient participation: Able to fully participate in evaluation  Level of consciousness: awake  Pain management: adequate  Airway patency: patent  Cardiovascular status: acceptable  Respiratory status: acceptable  Hydration status: euvolemic  PONV: controlled     Anesthetic complications: None          Last vitals:  Vitals:    05/01/18 1205 05/01/18 1210 05/01/18 1215   BP:   111/68   Resp:   29   Temp:      SpO2: 95% 97% 96%         Electronically Signed By: Henok Guzman MD  May 1, 2018  12:20 PM

## 2018-05-01 NOTE — IP AVS SNAPSHOT
[facility]    [address]    [facility] [address]    Phone:  [phone]    Fax:  [phone]                                       After Visit Summary   5/1/2018    Swetha Patterson    MRN: [id number]           After Visit Summary Signature Page     I have received my discharge instructions, and my questions have been answered. I have discussed any challenges I see with this plan with the nurse or doctor.    ..........................................................................................................................................  Patient/Patient Representative Signature      ..........................................................................................................................................  Patient Representative Print Name and Relationship to Patient    ..................................................               ................................................  Date                                            Time    ..........................................................................................................................................  Reviewed by Signature/Title    ...................................................              ..............................................  Date                                                            Time

## 2018-05-01 NOTE — OR NURSING
Volunteer desk received patient belongings from pt sister- brought to PACU in envelope - contains 1 necklace, 5 rings, one pack of cigarettes, lighter, and cell phone [employer]. Verified by Prachi Cuellar and Ajit DUARTE

## 2018-05-01 NOTE — ANESTHESIA CARE TRANSFER NOTE
Patient: Swetha Patterson    Procedure(s):  C5-6 Anterior cervical discectomy and fusion with possible structural or crushed allograft bone  PEEK interbody biochemical device, harvested Iliac crest or local autograft bone graft, Anterior instrumented fusion - Wound Class: I-Clean    Diagnosis: DDD, radiculopathy and stenosis  Diagnosis Additional Information: No value filed.    Anesthesia Type:   General, ETT     Note:  Airway :Face Mask  Patient transferred to:PACU  Comments: To PACU, adequate gas exchange, report to RN.Handoff Report: Identifed the Patient, Identified the Reponsible Provider, Reviewed the pertinent medical history, Discussed the surgical course, Reviewed Intra-OP anesthesia mangement and issues during anesthesia, Set expectations for post-procedure period and Allowed opportunity for questions and acknowledgement of understanding      Vitals: (Last set prior to Anesthesia Care Transfer)    CRNA VITALS  5/1/2018 1019 - 5/1/2018 1058      5/1/2018             Pulse: 86    SpO2: 99 %    Resp Rate (observed): 17                Electronically Signed By: LEIGH Diallo CRNA  May 1, 2018  10:58 AM

## 2018-05-01 NOTE — BRIEF OP NOTE
[facility] Brief Operative Note    Pre-operative diagnosis: DDD, radiculopathy and stenosis C56   Post-operative diagnosis * No post-op diagnosis entered *  same   Procedure: Procedure(s):  C5-6 Anterior cervical discectomy and fusion with possible structural or crushed allograft bone  PEEK interbody biochemical device, harvested Iliac crest or local autograft bone graft, Anterior instrumented fusion - Wound Class: I-Clean   Surgeon(s): Surgeon(s) and Role:     * Osmany Eric MD - Primary     * Carol Morris PA-C - Assisting   Estimated blood loss: 10 mL    Specimens: * No specimens in log *   Findings: No comp    Osmany Eric MD

## 2018-05-01 NOTE — PHARMACY-ADMISSION MEDICATION HISTORY
Medication reconciliation interview completed by pre-admitting nurse Divya Torres, reviewed by pharmacy. No further clarifications needed.       Prior to Admission medications    Medication Sig Last Dose Taking? Auth Provider   acetaminophen (TYLENOL) 325 MG tablet Take 2 tablets (650 mg) by mouth every 6 hours as needed for other (mild pain)  Yes Osmany Eric MD   albuterol (VENTOLIN HFA) 108 (90 BASE) MCG/ACT Inhaler Inhale 2 puffs into the lungs every 6 hours 5/1/2018 at Unknown time Yes Carson Lafleur MD   atorvastatin (LIPITOR) 80 MG tablet Take 1 tablet (80 mg) by mouth daily 4/30/2018 at Unknown time Yes Carson Lafleur MD   budesonide-formoterol (SYMBICORT) 160-4.5 MCG/ACT Inhaler Inhale 2 puffs into the lungs 2 times daily 4/30/2018 at Unknown time Yes Carson Lafleur MD   doxycycline (VIBRA-TABS) 100 MG tablet Take 1 tablet (100 mg) by mouth 2 times daily Has these on hand to use PRN for rash flare-up Past Week at Unknown time Yes Carson Lafleur MD   fluconazole (DIFLUCAN) 150 MG tablet Take 1 tablet (150 mg) by mouth every 3 days Has this on hand to use PRN for rash flare-up Past Week at Unknown time Yes Carson Lafleur MD   furosemide (LASIX) 20 MG tablet Take 1 tablet (20 mg) by mouth daily as needed Past Week at Unknown time Yes Carson Lafleur MD   hydrOXYzine (ATARAX) 50 MG tablet Take 1 tablet (50 mg) by mouth 4 times daily as needed for anxiety Increased dose. For anxiety Past Week at Unknown time Yes Carson Lafleur MD   ipratropium - albuterol 0.5 mg/2.5 mg/3 mL (DUONEB) 0.5-2.5 (3) MG/3ML neb solution Take 1 vial (3 mLs) by nebulization every 6 hours 5/1/2018 at Unknown time Yes Carson Lafleur MD   lisinopril (PRINIVIL/ZESTRIL) 20 MG tablet Take 1 tablet (20 mg) by mouth daily 4/30/2018 at Unknown time Yes Magaly Purcell APRN CNP   montelukast (SINGULAIR) 10 MG tablet Take 1 tablet (10 mg) by mouth At Bedtime 4/30/2018 at Unknown time Yes  Carsno Lafleur MD   omeprazole (PRILOSEC) 20 MG CR capsule Take 1 capsule (20 mg) by mouth 2 times daily 4/30/2018 at Unknown time Yes Carson Lafleur MD   ondansetron (ZOFRAN) 8 MG tablet Take 0.5-1 tablets (4-8 mg) by mouth every 8 hours as needed for nausea Past Month at Unknown time Yes Carson Lafleur MD   ondansetron (ZOFRAN-ODT) 4 MG ODT tab Take 1 tablet (4 mg) by mouth every 6 hours as needed for nausea Dissolve ON the tongue.  Yes Osmany Eric MD   oxyCODONE IR (ROXICODONE) 5 MG tablet Take 1-2 tablets (5-10 mg) by mouth every 3 hours as needed for pain or other (Moderate to Severe)  Yes Osmany Eric MD   VITAMIN D, CHOLECALCIFEROL, PO Take 2,000 Units by mouth daily 4/30/2018 at Unknown time Yes Reported, Patient   order for DME Equipment being ordered: Nebulizer machine  Length of need-lifetime   Roro Chawla MD

## 2018-05-02 ENCOUNTER — APPOINTMENT (OUTPATIENT)
Dept: PHYSICAL THERAPY | Facility: CLINIC | Age: 48
End: 2018-05-02
Attending: ORTHOPAEDIC SURGERY
Payer: MEDICARE

## 2018-05-02 VITALS
HEIGHT: 64 IN | SYSTOLIC BLOOD PRESSURE: 121 MMHG | OXYGEN SATURATION: 94 % | TEMPERATURE: 96.5 F | DIASTOLIC BLOOD PRESSURE: 53 MMHG | BODY MASS INDEX: 35.34 KG/M2 | HEART RATE: 65 BPM | RESPIRATION RATE: 16 BRPM | WEIGHT: 207 LBS

## 2018-05-02 PROCEDURE — 40000193 ZZH STATISTIC PT WARD VISIT: Performed by: PHYSICAL THERAPIST

## 2018-05-02 PROCEDURE — G8980 MOBILITY D/C STATUS: HCPCS | Mod: GP,CJ | Performed by: PHYSICAL THERAPIST

## 2018-05-02 PROCEDURE — G8979 MOBILITY GOAL STATUS: HCPCS | Mod: GP,CJ | Performed by: PHYSICAL THERAPIST

## 2018-05-02 PROCEDURE — 25000132 ZZH RX MED GY IP 250 OP 250 PS 637: Mod: GY | Performed by: ORTHOPAEDIC SURGERY

## 2018-05-02 PROCEDURE — 97530 THERAPEUTIC ACTIVITIES: CPT | Mod: GP | Performed by: PHYSICAL THERAPIST

## 2018-05-02 PROCEDURE — G8978 MOBILITY CURRENT STATUS: HCPCS | Mod: GP,CJ | Performed by: PHYSICAL THERAPIST

## 2018-05-02 PROCEDURE — 25000128 H RX IP 250 OP 636: Performed by: ORTHOPAEDIC SURGERY

## 2018-05-02 PROCEDURE — A9270 NON-COVERED ITEM OR SERVICE: HCPCS | Mod: GY | Performed by: ORTHOPAEDIC SURGERY

## 2018-05-02 PROCEDURE — 97161 PT EVAL LOW COMPLEX 20 MIN: CPT | Mod: GP | Performed by: PHYSICAL THERAPIST

## 2018-05-02 RX ADMIN — CEFAZOLIN SODIUM 2 G: 2 INJECTION, SOLUTION INTRAVENOUS at 01:00

## 2018-05-02 RX ADMIN — OXYCODONE HYDROCHLORIDE 10 MG: 5 TABLET ORAL at 03:45

## 2018-05-02 RX ADMIN — OXYCODONE HYDROCHLORIDE 10 MG: 5 TABLET ORAL at 11:17

## 2018-05-02 RX ADMIN — ACETAMINOPHEN 650 MG: 325 TABLET ORAL at 11:21

## 2018-05-02 RX ADMIN — Medication 1 LOZENGE: at 03:45

## 2018-05-02 RX ADMIN — OXYCODONE HYDROCHLORIDE 10 MG: 5 TABLET ORAL at 08:03

## 2018-05-02 RX ADMIN — HYDROXYZINE HYDROCHLORIDE 25 MG: 25 TABLET ORAL at 08:03

## 2018-05-02 RX ADMIN — OXYCODONE HYDROCHLORIDE 10 MG: 5 TABLET ORAL at 00:45

## 2018-05-02 RX ADMIN — ATORVASTATIN CALCIUM 80 MG: 40 TABLET, FILM COATED ORAL at 08:03

## 2018-05-02 RX ADMIN — LISINOPRIL 20 MG: 20 TABLET ORAL at 08:03

## 2018-05-02 NOTE — TELEPHONE ENCOUNTER
Pt calling again re: refill status.    Asking if colleague can refill med.  She just got home from hosp today (had neck surgery) and her anxiety level is increased.    Please advise, thanks.

## 2018-05-02 NOTE — PLAN OF CARE
Problem: Patient Care Overview  Goal: Plan of Care/Patient Progress Review  Outcome: Improving  Vital signs stable, on O2 at 2 lpm nasal cannula with capnography.  Lungs clear, encouraged inspirometer use.  Bowel sounds hypoactive, voiding.  Dressing  Intact to anterior neck, aspen collar on, ice pack applied.Deon drain intact.  Ambulated with sba of 1.  CMS intact except for baseline numbness /tingling in right hand.  Pain controlled with tylenol, oxycodone, vistaril and iv dilaudid.  Speech clear, airway patent, swallowing intact.  Post op plan of care reviewed with pt.

## 2018-05-02 NOTE — DISCHARGE INSTRUCTIONS
Remove dressing on Saturday ( May 5th). Daily dressing change starting Saturday. Instruction: Apply gauze and secure with tape. Place water proof dressing when showering.   Remove scopolamine patch to left ear on Friday ( May 4th)      Call MD if any of these issues occur:  1) Increased/persistent redness,bleeding, localized warmth and swelling,drainage (yellow/clear/odorous) or tenderness at or incision site. Or incision pulling apart.  2) Increased pain not controlled with oral pain medications.sedation  3) Persistent headache, dizziness, lightheaded   4)Persistent constipation despite taking OTC stool softners as directed   5) calf pain/swollen/hard/warm area,swelling chest pain or shortness of breath  6)increased/persistent numbness or tingling in arms or legs, weakness in extremities or falls  7) Generalized feeling of illness.  8) persistent fever, chills, sweats temp >101  9) trouble voiding, incontinence of bowel and/or bladder  10), Difficulty swallowing liquids or foods, feel like things stick in throat. Or coughing with food or drink  11) if unable to wake call 911  Other instructions:  1) . Avoid touching incision with dressing changes  2)   3)no heavy lifting, nothing more then 6-10lbs. No bending, lifiting or twisting, no sitting for long periods of time. Follow physical therapy restrictions and exercises-Slowly increase  activity  4) avoid sitting or laying in one position too long, walk as tolerated, log roll  5) wear brace at all times except when showering, inspect skin under brace daily and call md if sore area starts.  6) take an over the counter stool softener as directed while on narcotics to prevent constipation or to stay regular. Take a suppository or a laxative if no bowel movement in 2 days despite taking stool softener    Diet instructions:  Sit upright in chair for meals or at least 90 degree angle  Moisten mouth before eating or taking pills  Eat slowly and chew food well, alternate  food and drink  Cut food up small  Eat soft foods, easily swallowed food, small frequent meals    [phone] Dr briones call to make appointment or call with any issues

## 2018-05-02 NOTE — PROGRESS NOTES
05/02/18 1146   Quick Adds   Quick Adds Certification   Type of Visit Initial PT Evaluation   Living Environment   Lives With spouse   Living Arrangements house   Number of Stairs to Enter Home 3   Number of Stairs Within Home 0   Stair Railings at Home outside, present on left side   Transportation Available family or friend will provide   Self-Care   Dominant Hand right   Usual Activity Tolerance good   Current Activity Tolerance moderate   Regular Exercise no   Equipment Currently Used at Home none   Functional Level Prior   Ambulation 0-->independent   Transferring 0-->independent   Toileting 0-->independent   Bathing 0-->independent   Dressing 0-->independent   Eating 0-->independent   Fall history within last six months no   Which of the above functional risks had a recent onset or change? ambulation;transferring   General Information   Onset of Illness/Injury or Date of Surgery - Date 05/01/18   Referring Physician Osmany Eric MD   Patient/Family Goals Statement Return home today   Pertinent History of Current Problem (include personal factors and/or comorbidities that impact the POC) C5-6 Anterior cervical discectomy and fusion    Precautions/Limitations fall precautions;spinal precautions  (Cervical)   Weight-Bearing Status - LLE full weight-bearing   Weight-Bearing Status - RLE full weight-bearing   General Observations Pt sitting EOB upon initiation   Cognitive Status Examination   Orientation orientation to person, place and time   Level of Consciousness alert   Follows Commands and Answers Questions 100% of the time   Personal Safety and Judgment intact   Memory intact   Pain Assessment   Patient Currently in Pain Yes, see Vital Sign flowsheet   Integumentary/Edema   Integumentary/Edema Comments Dressing intact to anterior neck   Posture    Posture Protracted shoulders   Range of Motion (ROM)   ROM Comment ROM WNL   Strength   Strength Comments WNL   Bed Mobility   Bed Mobility Comments sit<>supine  "indep   Transfer Skills   Transfer Comments sit<>stand indep   Gait   Gait Comments SBA with gait   Sensory Examination   Sensory Perception no deficits were identified   Coordination   Coordination no deficits were identified   Muscle Tone   Muscle Tone no deficits were identified   General Therapy Interventions   Planned Therapy Interventions gait training;home program guidelines;progressive activity/exercise   Clinical Impression   Criteria for Skilled Therapeutic Intervention yes, treatment indicated   PT Diagnosis impaired functional mobility   Influenced by the following impairments pain, cervical precautions   Functional limitations due to impairments Difficulty with positioning in supine, and stair climbing   Clinical Presentation Stable/Uncomplicated   Clinical Presentation Rationale Medically stable, ready for DC   Clinical Decision Making (Complexity) Low complexity   Therapy Frequency` daily   Predicted Duration of Therapy Intervention (days/wks) 1 day   Anticipated Discharge Disposition Home   Risk & Benefits of therapy have been explained Yes   Patient, Family & other staff in agreement with plan of care Yes   Therapy Certification   Start of care date 05/02/18   Certification date from 05/02/18   Certification date to 05/02/18   Medical Diagnosis C5-6 Anterior cervical discectomy and fusion   [facility] Nano TM \"6 Clicks\"   2016, Trustees of [facility], under license to [employer].  All rights reserved.   6 Clicks Short Forms Basic Mobility Inpatient Short Form   [facility] AM-PAC  \"6 Clicks\" V.2 Basic Mobility Inpatient Short Form   1. Turning from your back to your side while in a flat bed without using bedrails? 4 - None   2. Moving from lying on your back to sitting on the side of a flat bed without using bedrails? 4 - None   3. Moving to and from a bed to a chair (including a wheelchair)? 3 - A Little   4. Standing up from a chair using your arms (e.g., wheelchair, or " bedside chair)? 4 - None   5. To walk in hospital room? 3 - A Little   6. Climbing 3-5 steps with a railing? 3 - A Little   Basic Mobility Raw Score (Score out of 24.Lower scores equate to lower levels of function) 21   Total Evaluation Time   Total Evaluation Time (Minutes) 8

## 2018-05-02 NOTE — PROGRESS NOTES
MAIDA     D: Per request to assist with setting up transport for pt's discharge today.      I: met with pt who informs that she uses the [employer] transport services for her rides, but she is unable to arrange a same day ride as it would need to be a care coordinator or SW who would need to do that. MAYRA has spoken to Celia at [employer] to arrange the ride but SW was unable as the address that pt has on hospital record is not the address on record in the [employer] system, therefore pt will need to call and verify her correct address.. Celia informs SW that pt can then set up her own transport for discharge today. MAYRA has discussed this with pt and provided the contact number for the ride.    A/P: Pt able to contact for ride home, she notes that she will be staying with a friend. SW available as needed.

## 2018-05-02 NOTE — PLAN OF CARE
Problem: Patient Care Overview  Goal: Plan of Care/Patient Progress Review  PT: Orders received. PT evaluation and treatment completed. Pt is POD1 C5-6 Anterior cervical discectomy and fusion. Pt notes living in a house with 3 steps to enter. Pt will have friends/family available to assist as needed.     Discharge Planner PT   Patient plan for discharge: Home  Current status: Pt demonstrates the ability to complete bed mobility and transfers indep. Pt ambulates in room with SBA progressing to indep with cues for posture and pacing. Pt educated in sleeping positions and stair climbing. Pt refuses need to practice stairs and is able to verbalize a safe plan for discharge. No further IPPT needs.   Barriers to return to prior living situation: None anticipated.   Recommendations for discharge: Home  Rationale for recommendations: Pt has demonstrated basic mobility skills adequate for safe discharge home.        Entered by: Sherri Garg 05/02/2018 12:13 PM      Physical Therapy Discharge Summary    Reason for therapy discharge:    All goals and outcomes met, no further needs identified.    Progress towards therapy goal(s). See goals on Care Plan in [facility] electronic health record for goal details.  Goals met    Therapy recommendation(s):    No further therapy is recommended.

## 2018-05-02 NOTE — PLAN OF CARE
Problem: Patient Care Overview  Goal: Plan of Care/Patient Progress Review  Outcome: Adequate for Discharge Date Met: 05/02/18  Patient A&Ox3. Up independently, steady gait. Neuro checks WNL. Neck collar in place. Removed drain and applied clean dressing. No s/s of infection noted. Educated patient on site care. Pain was managed with use of PRN oxycodone 10mg qrh. Reported after pain of 5/10 pain which is tolerable and baseline per patient. Tolerated advanced regular diet. Voiding well. Patient discharged to home via cab. Discharge education completed.

## 2018-05-02 NOTE — PLAN OF CARE
Problem: Patient Care Overview  Goal: Plan of Care/Patient Progress Review  Outcome: Improving   Patient Alert and oriented  X 4,  VSS, on O2 at 2 lpm nasal cannula with capnography. Lungs diminished in bases with exp wheezes, encouraged inspirometer use. Bowel sounds hypoactive, voiding well. Dressing  Intact to anterior neck, with  Shadow drainage. Aspen collar on, ice pack applied.Deon drain intact. Ambulating  To bathroom  with sba of 1. CMS intact except for baseline numbness /tingling  Thee middle fingers on right hand. Pain controlled with tylenol, oxycodone. Airway patent, swallowing intact. On full liquid diet. Will continue plan of care  Post op plan of care reviewed with pt.

## 2018-05-03 RX ORDER — HYDROXYZINE HYDROCHLORIDE 50 MG/1
50 TABLET, FILM COATED ORAL 4 TIMES DAILY PRN
Qty: 30 TABLET | Refills: 0 | Status: SHIPPED | OUTPATIENT
Start: 2018-05-03 | End: 2018-05-14

## 2018-05-07 NOTE — PROGRESS NOTES
[facility]      OUTPATIENT PHYSICAL THERAPY EVALUATION  PLAN OF TREATMENT FOR OUTPATIENT REHABILITATION  (COMPLETE FOR INITIAL CLAIMS ONLY)  Patient's Last Name, First Name, M.I.  YOB: 1970  Swetha Patterson                        Provider's Name  [facility] Medical Record No.  [id number]                               Onset Date:  05/01/18   Start of Care Date:  05/02/18      Type:     _X_PT   ___OT   ___SLP Medical Diagnosis:  C5-6 Anterior cervical discectomy and fusion                        PT Diagnosis:  impaired functional mobility   Visits from SOC:  1   _________________________________________________________________________________  Plan of Treatment/Functional Goals    Planned Interventions:  ,    gait training, home program guidelines, progressive activity/exercise,       Goals: See Physical Therapy Goals on Care Plan in [employer] electronic health record.    Therapy Frequency: daily  Predicted Duration of Therapy Intervention: 1 day  _________________________________________________________________________________    I CERTIFY THE NEED FOR THESE SERVICES FURNISHED UNDER        THIS PLAN OF TREATMENT AND WHILE UNDER MY CARE     (Physician co-signature of this document indicates review and certification of the therapy plan).                Certification date from: 05/02/18, Certification date to: 05/02/18    Referring Physician: Osmany Eric MD            Initial Assessment        See Physical Therapy evaluation dated 05/02/18 in Epic electronic health record.

## 2018-05-13 ENCOUNTER — TELEPHONE (OUTPATIENT)
Dept: INTERNAL MEDICINE | Facility: CLINIC | Age: 48
End: 2018-05-13

## 2018-05-14 DIAGNOSIS — F41.9 ANXIETY: ICD-10-CM

## 2018-05-14 RX ORDER — HYDROXYZINE HYDROCHLORIDE 50 MG/1
50 TABLET, FILM COATED ORAL 4 TIMES DAILY PRN
Qty: 120 TABLET | Refills: 0 | Status: SHIPPED | OUTPATIENT
Start: 2018-05-14 | End: 2018-06-25

## 2018-05-14 NOTE — TELEPHONE ENCOUNTER
"Patient states she needs greater quantity of Hydroxyzine, last qty #30 did not last long.  Uses 4 times a day, every day for anxiety.  Recent ortho surgery and pain has stressed her.  Asking for #120 at a time, ongoing.  SERJIO Chandra R.N.    Requested Prescriptions   Pending Prescriptions Disp Refills     hydrOXYzine (ATARAX) 50 MG tablet 30 tablet 0     Sig: Take 1 tablet (50 mg) by mouth 4 times daily as needed for anxiety Increased dose. For anxiety    Antihistamines Protocol Passed    5/14/2018 10:09 AM       Passed - Recent (12 mo) or future (30 days) visit within the authorizing provider's specialty    Patient had office visit in the last 12 months or has a visit in the next 30 days with authorizing provider or within the authorizing provider's specialty.  See \"Patient Info\" tab in inbasket, or \"Choose Columns\" in Meds & Orders section of the refill encounter.           Passed - Patient is age 3 or older    Apply age and/or weight-based dosing for peds patients age 3 and older.    Forward request to provider for patients under the age of 3.            "

## 2018-06-06 ENCOUNTER — TELEPHONE (OUTPATIENT)
Dept: INTERNAL MEDICINE | Facility: CLINIC | Age: 48
End: 2018-06-06

## 2018-06-06 NOTE — TELEPHONE ENCOUNTER
After I signed the paper, I noticed that she change PCP  She follows up with [facility] in [place], with Dr. Beverley Rosen  Please change PCP in the chart to Dr. Beverley Rosen

## 2018-06-19 NOTE — TELEPHONE ENCOUNTER
Name of person picking up: Sarah Patterson     If not patient, relationship to patient: self    Type of identification: [place] DL    DL #: [id number]    What was picked up: forms

## 2018-06-21 DIAGNOSIS — F41.9 ANXIETY: ICD-10-CM

## 2018-06-21 RX ORDER — HYDROXYZINE HYDROCHLORIDE 50 MG/1
50 TABLET, FILM COATED ORAL 4 TIMES DAILY PRN
Qty: 1 TABLET | Refills: 0 | OUTPATIENT
Start: 2018-06-21

## 2018-06-21 NOTE — TELEPHONE ENCOUNTER
"Requested Prescriptions   Pending Prescriptions Disp Refills     hydrOXYzine (ATARAX) 50 MG tablet 120 tablet 0     Sig: Take 1 tablet (50 mg) by mouth 4 times daily as needed for anxiety Increased dose. For anxiety    Antihistamines Protocol Passed    6/21/2018  3:25 PM       Passed - Recent (12 mo) or future (30 days) visit within the authorizing provider's specialty    Patient had office visit in the last 12 months or has a visit in the next 30 days with authorizing provider or within the authorizing provider's specialty.  See \"Patient Info\" tab in inbasket, or \"Choose Columns\" in Meds & Orders section of the refill encounter.           Passed - Patient is age 3 or older    Apply age and/or weight-based dosing for peds patients age 3 and older.    Forward request to provider for patients under the age of 3.            "

## 2018-06-25 DIAGNOSIS — F41.9 ANXIETY: ICD-10-CM

## 2018-06-25 NOTE — TELEPHONE ENCOUNTER
"Patient calls, asks if Dr. Low has approved her prescriptions for Hydroxyzine yet. Informed patient that Dr. Low declined request stating she has transferred care to Hollie Fay NP.     Patient states that she has not changed providers, Dr. Low is still her PCP. Requesting refill ASAP as her anxiety has been very bad.   Requested Prescriptions   Pending Prescriptions Disp Refills     hydrOXYzine (ATARAX) 50 MG tablet  Last Written Prescription Date:  5/14/18  Last Fill Quantity: 120,  # refills: 0   Last office visit: 4/26/2018 with prescribing provider:  Jazzy   Future Office Visit:    120 tablet 0       Sig: Take 1 tablet (50 mg) by mouth 4 times daily as needed for anxiety Increased dose. For anxiety     Antihistamines Protocol Passed     6/21/2018  3:25 PM         Passed - Recent (12 mo) or future (30 days) visit within the authorizing provider's specialty     Patient had office visit in the last 12 months or has a visit in the next 30 days with authorizing provider or within the authorizing provider's specialty.  See \"Patient Info\" tab in inbasket, or \"Choose Columns\" in Meds & Orders section of the refill encounter.           Passed - Patient is age 3 or older     Apply age and/or weight-based dosing for peds patients age 3 and older.     Forward request to provider for patients under the age of 3.         Please call back with update.  "

## 2018-06-26 RX ORDER — HYDROXYZINE HYDROCHLORIDE 50 MG/1
50 TABLET, FILM COATED ORAL 4 TIMES DAILY PRN
Qty: 60 TABLET | Refills: 0 | Status: SHIPPED | OUTPATIENT
Start: 2018-06-26 | End: 2018-07-17

## 2018-06-28 ENCOUNTER — OFFICE VISIT (OUTPATIENT)
Dept: FAMILY MEDICINE | Facility: CLINIC | Age: 48
End: 2018-06-28
Payer: MEDICARE

## 2018-06-28 ENCOUNTER — RADIANT APPOINTMENT (OUTPATIENT)
Dept: GENERAL RADIOLOGY | Facility: CLINIC | Age: 48
End: 2018-06-28
Attending: NURSE PRACTITIONER
Payer: MEDICARE

## 2018-06-28 VITALS
BODY MASS INDEX: 34.24 KG/M2 | DIASTOLIC BLOOD PRESSURE: 76 MMHG | OXYGEN SATURATION: 96 % | RESPIRATION RATE: 16 BRPM | TEMPERATURE: 97.1 F | SYSTOLIC BLOOD PRESSURE: 131 MMHG | HEART RATE: 71 BPM | WEIGHT: 199.5 LBS

## 2018-06-28 DIAGNOSIS — F33.1 MAJOR DEPRESSIVE DISORDER, RECURRENT EPISODE, MODERATE (H): ICD-10-CM

## 2018-06-28 DIAGNOSIS — J44.1 CHRONIC OBSTRUCTIVE PULMONARY DISEASE WITH (ACUTE) EXACERBATION (H): Primary | ICD-10-CM

## 2018-06-28 DIAGNOSIS — Z13.6 CARDIOVASCULAR SCREENING; LDL GOAL LESS THAN 130: ICD-10-CM

## 2018-06-28 DIAGNOSIS — R05.9 COUGH: ICD-10-CM

## 2018-06-28 DIAGNOSIS — J38.1 VOCAL CORD POLYP: ICD-10-CM

## 2018-06-28 DIAGNOSIS — N32.89 BLADDER SPASM: ICD-10-CM

## 2018-06-28 DIAGNOSIS — Z72.0 TOBACCO ABUSE: ICD-10-CM

## 2018-06-28 DIAGNOSIS — T14.91XA SUICIDE ATTEMPT (H): ICD-10-CM

## 2018-06-28 DIAGNOSIS — F11.20 OPIOID TYPE DEPENDENCE, CONTINUOUS (H): ICD-10-CM

## 2018-06-28 DIAGNOSIS — K75.81 NASH (NONALCOHOLIC STEATOHEPATITIS): Chronic | ICD-10-CM

## 2018-06-28 DIAGNOSIS — J45.40 MODERATE PERSISTENT ASTHMA WITHOUT COMPLICATION: ICD-10-CM

## 2018-06-28 DIAGNOSIS — I10 ESSENTIAL HYPERTENSION WITH GOAL BLOOD PRESSURE LESS THAN 140/90: Chronic | ICD-10-CM

## 2018-06-28 DIAGNOSIS — J44.9 CHRONIC OBSTRUCTIVE PULMONARY DISEASE, UNSPECIFIED COPD TYPE (H): ICD-10-CM

## 2018-06-28 DIAGNOSIS — R22.1 MASS OF NECK: ICD-10-CM

## 2018-06-28 DIAGNOSIS — R22.0 FACIAL MASS: ICD-10-CM

## 2018-06-28 DIAGNOSIS — F41.1 GAD (GENERALIZED ANXIETY DISORDER): ICD-10-CM

## 2018-06-28 DIAGNOSIS — R34 DECREASED URINATION: ICD-10-CM

## 2018-06-28 PROBLEM — T50.901A DRUG OVERDOSE: Status: RESOLVED | Noted: 2018-02-08 | Resolved: 2018-06-28

## 2018-06-28 PROBLEM — I95.9 HYPOTENSION, UNSPECIFIED HYPOTENSION TYPE: Status: RESOLVED | Noted: 2018-02-08 | Resolved: 2018-06-28

## 2018-06-28 LAB
ALBUMIN UR-MCNC: ABNORMAL MG/DL
APPEARANCE UR: ABNORMAL
BACTERIA #/AREA URNS HPF: ABNORMAL /HPF
BILIRUB UR QL STRIP: ABNORMAL
CAOX CRY #/AREA URNS HPF: ABNORMAL /HPF
COLOR UR AUTO: YELLOW
GLUCOSE UR STRIP-MCNC: NEGATIVE MG/DL
HGB UR QL STRIP: ABNORMAL
KETONES UR STRIP-MCNC: ABNORMAL MG/DL
LEUKOCYTE ESTERASE UR QL STRIP: NEGATIVE
NITRATE UR QL: NEGATIVE
NON-SQ EPI CELLS #/AREA URNS LPF: ABNORMAL /LPF
PH UR STRIP: 5.5 PH (ref 5–7)
RBC #/AREA URNS AUTO: ABNORMAL /HPF
SOURCE: ABNORMAL
SP GR UR STRIP: >1.03 (ref 1–1.03)
UROBILINOGEN UR STRIP-ACNC: 0.2 EU/DL (ref 0.2–1)
WBC #/AREA URNS AUTO: ABNORMAL /HPF

## 2018-06-28 PROCEDURE — 87086 URINE CULTURE/COLONY COUNT: CPT | Performed by: NURSE PRACTITIONER

## 2018-06-28 PROCEDURE — 81001 URINALYSIS AUTO W/SCOPE: CPT | Performed by: NURSE PRACTITIONER

## 2018-06-28 PROCEDURE — 80053 COMPREHEN METABOLIC PANEL: CPT | Performed by: NURSE PRACTITIONER

## 2018-06-28 PROCEDURE — 71046 X-RAY EXAM CHEST 2 VIEWS: CPT

## 2018-06-28 PROCEDURE — 99214 OFFICE O/P EST MOD 30 MIN: CPT | Performed by: NURSE PRACTITIONER

## 2018-06-28 PROCEDURE — 36415 COLL VENOUS BLD VENIPUNCTURE: CPT | Performed by: NURSE PRACTITIONER

## 2018-06-28 PROCEDURE — 80061 LIPID PANEL: CPT | Performed by: NURSE PRACTITIONER

## 2018-06-28 RX ORDER — PREDNISONE 20 MG/1
20 TABLET ORAL 2 TIMES DAILY
Qty: 10 TABLET | Refills: 0 | Status: SHIPPED | OUTPATIENT
Start: 2018-06-28 | End: 2018-08-22

## 2018-06-28 RX ORDER — ALBUTEROL SULFATE 0.83 MG/ML
2.5 SOLUTION RESPIRATORY (INHALATION) EVERY 6 HOURS PRN
Qty: 25 VIAL | Refills: 3 | Status: SHIPPED | OUTPATIENT
Start: 2018-06-28 | End: 2019-12-12

## 2018-06-28 RX ORDER — TIOTROPIUM BROMIDE 18 UG/1
CAPSULE ORAL; RESPIRATORY (INHALATION)
Qty: 90 CAPSULE | Refills: 3 | Status: SHIPPED | OUTPATIENT
Start: 2018-06-28 | End: 2020-02-20

## 2018-06-28 RX ORDER — TAMSULOSIN HYDROCHLORIDE 0.4 MG/1
0.4 CAPSULE ORAL DAILY
Qty: 14 CAPSULE | Refills: 0 | Status: SHIPPED | OUTPATIENT
Start: 2018-06-28 | End: 2018-11-20

## 2018-06-28 NOTE — MR AVS SNAPSHOT
After Visit Summary   6/28/2018    Swetha Patterson    MRN: [id number]           Patient Information     Date Of Birth          1970        Visit Information        Provider Department      6/28/2018 12:00 PM Emi Shirley APRN [facility]        Today's Diagnoses     Dysuria    -  1    History of lung cancer        Chronic obstructive pulmonary disease, unspecified COPD type (H)        Cough        Mass of neck        Facial mass        Decreased urination        Bladder spasm        Moderate persistent asthma without complication        CARDIOVASCULAR SCREENING; LDL GOAL LESS THAN 130        Tobacco abuse          Care Instructions    1.  Xray negative for lung infection.  For breathing start prednisone 1 pill twice a day for 5 days.  Continue symbicort and singulair.  Continue albuterol inhaler as needed for cough.  Restart spiriva, I sent this in.    2.  Urine does not look like infection.  I wonder about a stone.  Start flomax once a day for 7 days.  Check kidneys today.  Follow up with urology if not improving.  If you are unable to pee or you develop severe pain or fever you need to go to the ER.    3.  Will schedule follow up with pulmonology today to reestablish, I am unclear about the history of your lung disease, I do not see a cancer history listed    4.  sent in chantix and nicotine gum.  Follow up immediately if mood changes.    5.  Ultrasound of mass in face and neck, I do not feel them on exam    6.  See me in 1 week          Follow-ups after your visit        Additional Services     PULMONARY MEDICINE REFERRAL       Your provider has referred you to: [facility]: Lung Disease and Pulmonary Clinic [facility] [phone]   [email or web address]    Hx of asthma and COPD, pt reports hx of lung cancer though I do not see this documented in her chart.  Needs to reestablish with pulmonology.      Please be aware  that coverage of these services is subject to the terms and limitations of your health insurance plan.  Call member services at your health plan with any benefit or coverage questions.      Please bring the following with you to your appointment:    (1) Any X-Rays, CTs or MRIs which have been performed.  Contact the facility where they were done to arrange for  prior to your scheduled appointment.    (2) List of current medications   (3) This referral request   (4) Any documents/labs given to you for this referral            UROLOGY ADULT REFERRAL       Your provider has referred you to: [facility]: [facility] [place] [phone]   [email or web address]    Please be aware that coverage of these services is subject to the terms and limitations of your health insurance plan.  Call member services at your health plan with any benefit or coverage questions.      Please bring the following with you to your appointment:    (1) Any X-Rays, CTs or MRIs which have been performed.  Contact the facility where they were done to arrange for  prior to your scheduled appointment.    (2) List of current medications  (3) This referral request   (4) Any documents/labs given to you for this referral                  Your next 10 appointments already scheduled     Aug 13, 2018  9:00 AM CDT   FULL PULMONARY FUNCTION with  PFL C   [facility] Pulmonary Function Testing ([facility])    [address] [place] [address]  3rd Floor  [facility] [id number] [phone]            Aug 13, 2018 10:00 AM CDT   (Arrive by 9:45 AM)   Return Visit with Carol Jones MD   [facility] ([facility])    [address] [place] [address]  [address] [place] [id number]   [phone]              Future tests that were ordered for you today     Open Future Orders        Priority Expected Expires Ordered    US Head Neck Soft Tissue  "Routine  2019            Who to contact     If you have questions or need follow up information about today's clinic visit or your schedule please contact [facility] directly at [phone].  Normal or non-critical lab and imaging results will be communicated to you by MyChart, letter or phone within 4 business days after the clinic has received the results. If you do not hear from us within 7 days, please contact the clinic through [employer] or phone. If you have a critical or abnormal lab result, we will notify you by phone as soon as possible.  Submit refill requests through [employer] or call your pharmacy and they will forward the refill request to us. Please allow 3 business days for your refill to be completed.          Additional Information About Your Visit        [employer] Information     [employer] lets you send messages to your doctor, view your test results, renew your prescriptions, schedule appointments and more. To sign up, go to [email or web address] . Click on \"Log in\" on the left side of the screen, which will take you to the Welcome page. Then click on \"Sign up Now\" on the right side of the page.     You will be asked to enter the access code listed below, as well as some personal information. Please follow the directions to create your username and password.     Your access code is: [email or web address]  Expires: 2018  1:06 PM     Your access code will  in 90 days. If you need help or a new code, please call your [facility] clinic or [phone].        Care EveryWhere ID     This is your Care EveryWhere ID. This could be used by other organizations to access your [facility] medical records  [id number]        Your Vitals Were     Pulse Temperature Respirations Last Period Pulse Oximetry BMI (Body Mass Index)    71 97.1  F (36.2  C) (Oral) 16 04/15/2016 96% 34.24 kg/m2       Blood Pressure from Last 3 Encounters:   18 131/76   18 121/53   18 104/72    " Weight from Last 3 Encounters:   06/28/18 199 lb 8 oz (90.5 kg)   05/01/18 207 lb (93.9 kg)   04/26/18 208 lb (94.3 kg)              We Performed the Following     Basic metabolic panel     Lipid panel reflex to direct LDL Fasting     PULMONARY MEDICINE REFERRAL     UA reflex to Microscopic and Culture     Urine Culture Aerobic Bacterial     Urine Microscopic     UROLOGY ADULT REFERRAL          Today's Medication Changes          These changes are accurate as of 6/28/18  1:06 PM.  If you have any questions, ask your nurse or doctor.               Start taking these medicines.        Dose/Directions    nicotine polacrilex 2 MG gum   Commonly known as:  NICORETTE   Used for:  Tobacco abuse   Started by:  Emi Shirley APRN CNP        Dose:  2 mg   Place 1 each (2 mg) inside cheek as needed for smoking cessation   Quantity:  100 tablet   Refills:  3       order for DME   Used for:  Chronic obstructive pulmonary disease, unspecified COPD type (H), Moderate persistent asthma without complication   Started by:  Emi Shirley APRN CNP        Equipment being ordered: Nebulizer   Quantity:  1 Units   Refills:  0       predniSONE 20 MG tablet   Commonly known as:  DELTASONE   Used for:  Chronic obstructive pulmonary disease, unspecified COPD type (H), Cough   Started by:  Emi Shirley APRN CNP        Dose:  20 mg   Take 1 tablet (20 mg) by mouth 2 times daily   Quantity:  10 tablet   Refills:  0       tamsulosin 0.4 MG capsule   Commonly known as:  FLOMAX   Used for:  Bladder spasm, Decreased urination   Started by:  Emi Shirley APRN CNP        Dose:  0.4 mg   Take 1 capsule (0.4 mg) by mouth daily   Quantity:  14 capsule   Refills:  0       tiotropium 18 MCG capsule   Commonly known as:  SPIRIVA HANDIHALER   Used for:  Chronic obstructive pulmonary disease, unspecified COPD type (H)   Started by:  Emi Shirley APRN CNP        Inhale contents of one capsule daily.    Quantity:  90 capsule   Refills:  3       varenicline 0.5 MG X 11 & 1 MG X 42 tablet   Commonly known as:  CHANTIX STARTING MONTH CHAPITO   Used for:  Tobacco abuse   Started by:  Emi Shirley APRN CNP        Take 0.5 mg tab daily for 3 days, then 0.5 mg tab twice daily for 4 days, then 1 mg twice daily.   Quantity:  53 tablet   Refills:  0         These medicines have changed or have updated prescriptions.        Dose/Directions    * albuterol 108 (90 Base) MCG/ACT Inhaler   Commonly known as:  VENTOLIN HFA   This may have changed:  Another medication with the same name was added. Make sure you understand how and when to take each.   Used for:  Shortness of breath   Changed by:  Emi Shirley APRN CNP        Dose:  2 puff   Inhale 2 puffs into the lungs every 6 hours   Quantity:  3 Inhaler   Refills:  3       * albuterol (2.5 MG/3ML) 0.083% neb solution   This may have changed:  You were already taking a medication with the same name, and this prescription was added. Make sure you understand how and when to take each.   Used for:  Chronic obstructive pulmonary disease, unspecified COPD type (H), Cough   Changed by:  Emi Shirley APRN CNP        Dose:  2.5 mg   Take 1 vial (2.5 mg) by nebulization every 6 hours as needed for shortness of breath / dyspnea or wheezing   Quantity:  25 vial   Refills:  3       * Notice:  This list has 2 medication(s) that are the same as other medications prescribed for you. Read the directions carefully, and ask your doctor or other care provider to review them with you.      Stop taking these medicines if you haven't already. Please contact your care team if you have questions.     fluconazole 150 MG tablet   Commonly known as:  DIFLUCAN   Stopped by:  Emi Shirley APRN CNP                Where to get your medicines      These medications were sent to [facility] [place], [place] - [address]  [address], [place]  [place] [address]     Phone:  [phone]     albuterol (2.5 MG/3ML) 0.083% neb solution    nicotine polacrilex 2 MG gum    predniSONE 20 MG tablet    tamsulosin 0.4 MG capsule    tiotropium 18 MCG capsule    varenicline 0.5 MG X 11 & 1 MG X 42 tablet         Some of these will need a paper prescription and others can be bought over the counter.  Ask your nurse if you have questions.     Bring a paper prescription for each of these medications     order for DME                Primary Care Provider Office Phone # Fax #    Emi Aster Shirley, APRN [facility] [phone]       [address]  [facility] [address]        Equal Access to Services     CHARITY MANDUJANO : Ramon Jin, waander kendrick, qaybta kaalmada alexandru, caryl cowan . So [facility] [phone].    ATENCIÓN: Si habla español, tiene a vargas disposición servicios gratuitos de asistencia lingüística. Llame al [phone].    We comply with applicable federal civil rights laws and [place] laws. We do not discriminate on the basis of race, color, national origin, age, disability, sex, sexual orientation, or gender identity.            Thank you!     Thank you for choosing [facility]  for your care. Our goal is always to provide you with excellent care. Hearing back from our patients is one way we can continue to improve our services. Please take a few minutes to complete the written survey that you may receive in the mail after your visit with us. Thank you!             Your Updated Medication List - Protect others around you: Learn how to safely use, store and throw away your medicines at www.disposemymeds.org.          This list is accurate as of 6/28/18  1:06 PM.  Always use your most recent med list.                   Brand Name Dispense Instructions for use Diagnosis    acetaminophen 325 MG tablet    TYLENOL    50 tablet    Take 2 tablets (650 mg) by mouth every 6 hours as needed for other (mild  pain)    S/P cervical spinal fusion       * albuterol 108 (90 Base) MCG/ACT Inhaler    VENTOLIN HFA    3 Inhaler    Inhale 2 puffs into the lungs every 6 hours    Shortness of breath       * albuterol (2.5 MG/3ML) 0.083% neb solution     25 vial    Take 1 vial (2.5 mg) by nebulization every 6 hours as needed for shortness of breath / dyspnea or wheezing    Chronic obstructive pulmonary disease, unspecified COPD type (H), Cough       atorvastatin 80 MG tablet    LIPITOR    30 tablet    Take 1 tablet (80 mg) by mouth daily    Hyperlipidemia LDL goal <130       budesonide-formoterol 160-4.5 MCG/ACT Inhaler    SYMBICORT    3 Inhaler    Inhale 2 puffs into the lungs 2 times daily    Severe chronic obstructive pulmonary disease (H)       furosemide 20 MG tablet    LASIX    30 tablet    Take 1 tablet (20 mg) by mouth daily as needed    Bilateral leg edema       hydrOXYzine 50 MG tablet    ATARAX    60 tablet    Take 1 tablet (50 mg) by mouth 4 times daily as needed for anxiety Increased dose. For anxiety    Anxiety       ipratropium - albuterol 0.5 mg/2.5 mg/3 mL 0.5-2.5 (3) MG/3ML neb solution    DUONEB    360 mL    Take 1 vial (3 mLs) by nebulization every 6 hours    Pulmonary emphysema, unspecified emphysema type (H)       lisinopril 20 MG tablet    PRINIVIL/ZESTRIL    90 tablet    Take 1 tablet (20 mg) by mouth daily    Essential hypertension with goal blood pressure less than 140/90       montelukast 10 MG tablet    SINGULAIR    30 tablet    Take 1 tablet (10 mg) by mouth At Bedtime    Dust allergy, Severe chronic obstructive pulmonary disease (H)       nicotine polacrilex 2 MG gum    NICORETTE    100 tablet    Place 1 each (2 mg) inside cheek as needed for smoking cessation    Tobacco abuse       omeprazole 20 MG CR capsule    priLOSEC    60 capsule    Take 1 capsule (20 mg) by mouth 2 times daily    Acute gastritis, presence of bleeding unspecified, unspecified gastritis type       ondansetron 4 MG ODT tab     ZOFRAN-ODT    4 tablet    Take 1 tablet (4 mg) by mouth every 6 hours as needed for nausea Dissolve ON the tongue.    S/P cervical spinal fusion       ondansetron 8 MG tablet    ZOFRAN    15 tablet    Take 0.5-1 tablets (4-8 mg) by mouth every 8 hours as needed for nausea    Nausea       order for DME     1 Device    Equipment being ordered: Nebulizer machine Length of need-lifetime    Chronic lung disease, Cough, Severe persistent asthma with acute exacerbation       order for DME     1 Units    Equipment being ordered: Nebulizer    Chronic obstructive pulmonary disease, unspecified COPD type (H), Moderate persistent asthma without complication       oxyCODONE IR 5 MG tablet    ROXICODONE    30 tablet    Take 1-2 tablets (5-10 mg) by mouth every 3 hours as needed for pain or other (Moderate to Severe)    S/P cervical spinal fusion       predniSONE 20 MG tablet    DELTASONE    10 tablet    Take 1 tablet (20 mg) by mouth 2 times daily    Chronic obstructive pulmonary disease, unspecified COPD type (H), Cough       tamsulosin 0.4 MG capsule    FLOMAX    14 capsule    Take 1 capsule (0.4 mg) by mouth daily    Bladder spasm, Decreased urination       tiotropium 18 MCG capsule    SPIRIVA HANDIHALER    90 capsule    Inhale contents of one capsule daily.    Chronic obstructive pulmonary disease, unspecified COPD type (H)       varenicline 0.5 MG X 11 & 1 MG X 42 tablet    CHANTIX STARTING MONTH CHAPITO    53 tablet    Take 0.5 mg tab daily for 3 days, then 0.5 mg tab twice daily for 4 days, then 1 mg twice daily.    Tobacco abuse       VITAMIN D (CHOLECALCIFEROL) PO      Take 2,000 Units by mouth daily        * Notice:  This list has 2 medication(s) that are the same as other medications prescribed for you. Read the directions carefully, and ask your doctor or other care provider to review them with you.

## 2018-06-28 NOTE — PROGRESS NOTES
SUBJECTIVE:   Swetha Patterson is a 48 year old female who presents to clinic today for the following health issues:      URINARY TRACT SYMPTOMS      Duration: 5 days    Description  frequency, urgency and bladder spasms, dark urine    Intensity:  moderate    Accompanying signs and symptoms:  Fever/chills: no   Flank pain no   Nausea and vomiting: YES- nausea  Vaginal symptoms: irritation after urinating  Abdominal/Pelvic Pain: no     History  History of frequent UTI's: no   History of kidney stones: no   Sexually Active: YES  Possibility of pregnancy: No    Precipitating or alleviating factors: None    Therapies tried and outcome: cranberry juice    Outcome: helped at first    Patient is here today for urinary symptoms and breathing concerns.  previously followed by PCP Dr. Low at [facility] ([place], [place]) but doesn't live there anymore, needs to find new primary.  Pt actually has very complex past medical history including chronic lung disease, significant mental health issues, chronic pain.    urinary symptoms:  Notes decreased urination for the last week.  Peeing twice a day, It feels like she has to go but then going only small amount.  Urine looks dark.  Also having bladder spasm.  She is pushing fluids. No dysuria or frequency.  She is having cold sweats, does endorse back pain the last several days.  No abdominal pain.  Does endorse constipation, BM yesterday, hard and had to strain.  No hx of renal or bladder stones.      Respiratory symptoms:  Pt would also like to talk about exacerbation of chronic lung disease, increased coughing and shortness of breath lately.  Breathing symptoms worse 1 week ago.  Chest is tight, she is coughing more.  She is feeling more short of breath.  Thinks she might be getting URI but no current cold symptoms.  No fever.    When she is healthy she has symptoms of daily wheezing, coughing, and shortness of breath.      Hx of chronic lung disease on symbicort and singulair, and albuterol.   Has been on spiriva in the past but ran out.    Pt reports hx of lung cancer, states in remission sine 2015, I do not see any cancer history documented in her chart.  She had lung and throat surgery 3 times.  She sees oncology and pulmonology at [facility] and [facility].  Last pulm visit 1/2017, she knows she is way overdue for follow up.  Last saw oncology 2014.      Tobacco use:  Wanting to go back on Chantix.  She has been on chantix in the past, had some vivid dreams, no other side effects, did not have mood changes.  She quit for 1 year.  Smoking 1/2ppd.      Mental health:  Would like to start something for anxiety.  Hx of anxiety, no hx of depression.  Hospitalized 2/2018 following intentional overdose.  She reports this was following finding out that her  was still  to his ex-wife.  She denies current depression symptoms or SI.        Soft tissue masses:  She notes two new masses to her left neck a few months ago, they are increasing in size.      Chronic pain:  Chart review notes hx of chronic pain on opioids in the past.  Recent [facility] note 4/2018 refers to multiple positive cocaine utoxs.  Pt was declining CD treatment.    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)     Chr PAIN - Ctr SUBSTANCE AGREEMENT - SIGNED     Chr Lung Disease - managed by the pulm dept ([facility])^^^     Continuous opioid dependence (HCC) opioid for chr cough and chest pain ^^^^     DIAZ (nonalcoholic steatohepatitis)     Essential hypertension with goal blood pressure less than 140/90     Cervical HNP C5-6     Opioid type dependence, continuous (H)     Suicide attempt     Intentional drug overdose (H)     3     Drug overdose     Status post cervical spinal fusion      Past Surgical History:   Procedure Laterality Date     ARTHROSCOPY SHOULDER DECOMPRESSION Left 10/21/2015    Procedure: ARTHROSCOPY SHOULDER DECOMPRESSION;  Surgeon: Julien Milian MD;  Location: [facility] OR     BRONCHIAL THERMOPLASTY N/A 11/14/2014    Procedure: BRONCHIAL THERMOPLASTY;  Surgeon: Ward Whitaker MD;  Location: [facility] GI     BRONCHIAL THERMOPLASTY N/A 12/19/2014    Procedure: BRONCHIAL THERMOPLASTY;  Surgeon: Ward Whitaker MD;  Location: [facility] OR     BRONCHIAL THERMOPLASTY N/A 2/6/2015    Procedure: BRONCHIAL THERMOPLASTY;  Surgeon: Ward Whitaker MD;  Location: [facility]     C APPENDECTOMY  at age 17     DISCECTOMY, FUSION CERVICAL ANTERIOR ONE LEVEL, COMBINED N/A 5/1/2018    Procedure: COMBINED DISCECTOMY, FUSION CERVICAL ANTERIOR ONE LEVEL;  1.  C5-C6 anterior cervical diskectomy and fusion.    2.  C5-C6 application of intervertebral biomechanical device for interbody fusion purposes.    3.  C5-C6 anterior instrumentation using the standard Kristin InViZia 24 mm plate with four associated bone screws, 12 mm in length.  Inferior screws are fixed.  Superior screws are va     EXCISE NODE MEDIASTINAL  4/26/2013    Procedure: EXCISE NODE MEDIASTINAL;;  Surgeon: Av Peña MD;  Location:  OR     LAPAROSCOPIC CHOLECYSTECTOMY N/A 10/19/2017    Procedure: LAPAROSCOPIC CHOLECYSTECTOMY;  LAPAROSCOPIC CHOLECYSTECTOMY;  Surgeon: Ney Jerry MD;  Location:  OR     THORACOSCOPY  4/26/2013    Procedure: THORACOSCOPY;  LEFT VIDEO ASSISTED THORACOSCOPY, RESECTION OF POSTERIOR MEDIASTINAL MASS;  Surgeon: Av Peña MD;  Location:  OR     TONSILLECTOMY  as a kid     TONSILLECTOMY         Social History   Substance Use Topics     Smoking status: Light Tobacco Smoker     Years: 30.00     Types: Cigarettes     Last attempt to quit: 9/1/2015     Smokeless tobacco: Never Used      Comment: off/on 5 cigs per day     Alcohol use No     Family History   Problem Relation Age  of Onset     HEART DISEASE Mother      murmur, mi     Hypertension Mother      Cerebrovascular Disease Mother      Depression Mother      Gallbladder Disease Mother      Asthma Mother      Family History Negative Father      does not know  him     Family History Negative Brother      HEART DISEASE Maternal Grandfather      Asthma Maternal Grandfather      Hypertension Maternal Grandfather      Cerebrovascular Disease Maternal Grandfather      Diabetes Maternal Grandfather      Asthma Sister      Hypertension Sister      Cerebrovascular Disease Sister      Hypertension Maternal Grandmother      Cerebrovascular Disease Maternal Grandmother          Current Outpatient Prescriptions   Medication Sig Dispense Refill     acetaminophen (TYLENOL) 325 MG tablet Take 2 tablets (650 mg) by mouth every 6 hours as needed for other (mild pain) 50 tablet 0     albuterol (2.5 MG/3ML) 0.083% neb solution Take 1 vial (2.5 mg) by nebulization every 6 hours as needed for shortness of breath / dyspnea or wheezing 25 vial 3     albuterol (VENTOLIN HFA) 108 (90 BASE) MCG/ACT Inhaler Inhale 2 puffs into the lungs every 6 hours 3 Inhaler 3     atorvastatin (LIPITOR) 80 MG tablet Take 1 tablet (80 mg) by mouth daily 30 tablet 0     budesonide-formoterol (SYMBICORT) 160-4.5 MCG/ACT Inhaler Inhale 2 puffs into the lungs 2 times daily 3 Inhaler 1     furosemide (LASIX) 20 MG tablet Take 1 tablet (20 mg) by mouth daily as needed 30 tablet 0     hydrOXYzine (ATARAX) 50 MG tablet Take 1 tablet (50 mg) by mouth 4 times daily as needed for anxiety Increased dose. For anxiety 60 tablet 0     ipratropium - albuterol 0.5 mg/2.5 mg/3 mL (DUONEB) 0.5-2.5 (3) MG/3ML neb solution Take 1 vial (3 mLs) by nebulization every 6 hours 360 mL 0     lisinopril (PRINIVIL/ZESTRIL) 20 MG tablet Take 1 tablet (20 mg) by mouth daily 90 tablet 1     montelukast (SINGULAIR) 10 MG tablet Take 1 tablet (10 mg) by mouth At Bedtime 30 tablet 0     nicotine polacrilex  (NICORETTE) 2 MG gum Place 1 each (2 mg) inside cheek as needed for smoking cessation 100 tablet 3     omeprazole (PRILOSEC) 20 MG CR capsule Take 1 capsule (20 mg) by mouth 2 times daily 60 capsule 0     ondansetron (ZOFRAN) 8 MG tablet Take 0.5-1 tablets (4-8 mg) by mouth every 8 hours as needed for nausea 15 tablet 0     ondansetron (ZOFRAN-ODT) 4 MG ODT tab Take 1 tablet (4 mg) by mouth every 6 hours as needed for nausea Dissolve ON the tongue. 4 tablet 0     order for DME Equipment being ordered: Nebulizer 1 Units 0     order for DME Equipment being ordered: Nebulizer machine  Length of need-lifetime 1 Device 0     oxyCODONE IR (ROXICODONE) 5 MG tablet Take 1-2 tablets (5-10 mg) by mouth every 3 hours as needed for pain or other (Moderate to Severe) 30 tablet 0     predniSONE (DELTASONE) 20 MG tablet Take 1 tablet (20 mg) by mouth 2 times daily 10 tablet 0     tamsulosin (FLOMAX) 0.4 MG capsule Take 1 capsule (0.4 mg) by mouth daily 14 capsule 0     tiotropium (SPIRIVA HANDIHALER) 18 MCG capsule Inhale contents of one capsule daily. 90 capsule 3     varenicline (CHANTIX STARTING MONTH PAK) 0.5 MG X 11 & 1 MG X 42 tablet Take 0.5 mg tab daily for 3 days, then 0.5 mg tab twice daily for 4 days, then 1 mg twice daily. 53 tablet 0     VITAMIN D, CHOLECALCIFEROL, PO Take 2,000 Units by mouth daily         ROS:  Const, HEENT, Resp, , GI, Psych as above, otherwise negative       OBJECTIVE:                                                    /76 (BP Location: Right arm, Patient Position: Chair, Cuff Size: Adult Large)  Pulse 71  Temp 97.1  F (36.2  C) (Oral)  Resp 16  Wt 199 lb 8 oz (90.5 kg)  LMP 04/15/2016  SpO2 96%  BMI 34.24 kg/m2   GENERAL APPEARANCE: alert and no distress  EYES: Eyes grossly normal to inspection and conjunctivae and sclerae normal  HENT: I am unable to appreciate soft tissue mass that pt points to in left inframandibular area or the mass she points to in medial supraclavicular  area  NECK: no adenopathy  RESP: diminished throughout  CV: regular rates and rhythm, normal S1 S2, no S3 or S4 and no murmur, click or rub  PSYCH: mentation appears normal and affect normal/bright     Diagnostic test results:  Diagnostic Test Results:  Results for orders placed or performed in visit on 06/28/18 (from the past 24 hour(s))   UA reflex to Microscopic and Culture   Result Value Ref Range    Color Urine Yellow     Appearance Urine Slightly Cloudy     Glucose Urine Negative NEG^Negative mg/dL    Bilirubin Urine Small (A) NEG^Negative    Ketones Urine Trace (A) NEG^Negative mg/dL    Specific Gravity Urine >1.030 1.003 - 1.035    Blood Urine Small (A) NEG^Negative    pH Urine 5.5 5.0 - 7.0 pH    Protein Albumin Urine Trace (A) NEG^Negative mg/dL    Urobilinogen Urine 0.2 0.2 - 1.0 EU/dL    Nitrite Urine Negative NEG^Negative    Leukocyte Esterase Urine Negative NEG^Negative    Source Midstream Urine    Urine Microscopic   Result Value Ref Range    WBC Urine 0 - 5 OTO5^0 - 5 /HPF    RBC Urine O - 2 OTO2^O - 2 /HPF    Squamous Epithelial /LPF Urine Moderate (A) FEW^Few /LPF    Bacteria Urine Moderate (A) NEG^Negative /HPF    Calcium Oxalate Many (A) NEG^Negative /HPF       Chest xray negative for infiltrate on my interpretation, radiology read pending.     ASSESSMENT/PLAN:                                                    (J44.1) Chronic obstructive pulmonary disease with (acute) exacerbation (H)  (primary encounter diagnosis)  (J45.40) Moderate persistent asthma without complication  (R05) Cough  Comment: hx of severe COPD and has been on chronic steroids in the past.  Has followed with pulm in the past, last visit 1 year ago.  Hx of bronchial thermoplasty [id number].  Pt reports hx of lung cancer to me today but I see no documentation of this in any of her charts.  She has been followed for pulm nodules in the past.  Pt reports recent worsening of resp symptoms in the past week.  No illness or identified  trigger.  CXR today neg for infiltrate.  Plan: PULMONARY MEDICINE REFERRAL        Start 5d prednisone burst.  Cont symbicort, singulair, and restart spiriva.  Albuterol neb q4h as needed for symptoms.  Follow up in 1 week for recheck.  Scheduled to reestablish with pulm 8/2018.      (R34) Decreased urination  (N32.89) Bladder spasm  Comment: UA today not consistent with infection.  Possible bladder/urethral stone?  Also consider JENI  Plan: UA reflex to Microscopic and Culture, Urine         Microscopic, Urine Culture Aerobic Bacterial,         UROLOGY ADULT REFERRAL, tamsulosin (FLOMAX) 0.4        MG capsule, Comprehensive metabolic panel,         CANCELED: Basic metabolic panel        Labs today for renal function.  Start flomax for possible stone.  Will send urine for final culture.  If not improving recommend follow up with urology, she has appt 8/2/18, follow up with myself in 1 week and will need more urgent eval if not improving      (R22.1) Mass of neck  (R22.0) Facial mass  Comment: I am unable to appreciate these masses on exam today, pt states new as of 2 months ago and increasing in size  Plan: US Head Neck Soft Tissue        US to eval for mass    (F41.1) GIDEON (generalized anxiety disorder)  (F33.1) Major depressive disorder, recurrent episode, moderate (H)  (T14.91XA) Suicide attempt  Comment: hx of hospitalization 2/2018 following intentional med overdose, pt states this was triggered by finding out her  was still  to his exwife.  She is requesting medication for anxiety today, states depression is stable   Plan: address in more detail at follow up visit    (Z72.0) Tobacco abuse  Comment: interested in restarting chantix  Plan: varenicline (CHANTIX STARTING MONTH PAK) 0.5 MG        X 11 & 1 MG X 42 tablet, nicotine polacrilex         (NICORETTE) 2 MG gum        Reviewed side effects, dosing, and picking quit date.  Also wanting nicotine gum.  Discussed close monitoring for mood changes and  follow up immediately if these occur.      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  Plan:     (K75.81) DIAZ (nonalcoholic steatohepatitis)  Comment:   Plan: Comprehensive metabolic panel            (I10) Essential hypertension with goal blood pressure less than 140/90  Comment: normal blood pressure today  Plan: monitor    (F11.20) Opioid type dependence, continuous (H)  Comment: hx of chronic opioid dependence for chronic pain.  Also hx cocaine abuse with positive utox  Plan: pt would not be appropriate candidate for controlled substances    Note pt has extensive medical records spanning 5 health symptoms with notes often documenting dissatisfaction with previous providers and frequent transfer of care.    Follow up with Provider - 1 week   See Patient Instructions    Emi Shirley, [facility]    Patient Instructions   1.  Xray negative for lung infection.  For breathing start prednisone 1 pill twice a day for 5 days.  Continue symbicort and singulair.  Continue albuterol inhaler as needed for cough.  Restart spiriva, I sent this in.    2.  Urine does not look like infection.  I wonder about a stone.  Start flomax once a day for 7 days.  Check kidneys today.  Follow up with urology if not improving.  If you are unable to pee or you develop severe pain or fever you need to go to the ER.    3.  Will schedule follow up with pulmonology today to reestablish, I am unclear about the history of your lung disease, I do not see a cancer history listed    4.  sent in chantix and nicotine gum.  Follow up immediately if mood changes.    5.  Ultrasound of mass in face and neck, I do not feel them on exam    6.  See me in 1 week

## 2018-06-28 NOTE — LETTER
June 29, 2018      Swetha Patterson  [address] [place], [place] [id number]        Dear MsAngeLeonardo,    We are writing to inform you of your test results.    Normal kidney function, liver function, and blood sugar.      The results of your recent lipid (cholesterol) profile were abnormal.     Desired or goal lipid levels are:  CHOLESTEROL: Desirable is less than 200.  HDL (Good Cholesterol): Desirable is greater than 40 in men and greater than 50 in women.  LDL (Bad Cholesterol): Desirable is less than 130 or less than 100 in patients with diabetes or vascular disease. For some patients with diabetes or vascular disease, the desireable LDL is less than 70.  TRIGLYCERIDES: Desirable is less than 150.      As you may know, an elevated cholesterol is one factor that increases your risk for heart disease and stroke.  Cholesterol can be somewhat influenced by diet but there is a large genetic component as well.    The Mediterranean diet has some evidence for improving cholesterol and reducing cardiovascular risk.  Such a diet is typically high in fruits, vegetables, whole grains, beans, nuts, and seeds and includes olive oil as an important source of fat; there are typically low to moderate amounts of fish, poultry, and dairy products, and there is little red meat.    Also consider starting or increasing your aerobic activity.  Aerobic activity is the best way to improve HDL (good) cholesterol.  If this would be new to you, please talk with me first about what activities are safe for you.    Resulted Orders   UA reflex to Microscopic and Culture   Result Value Ref Range    Color Urine Yellow     Appearance Urine Slightly Cloudy     Glucose Urine Negative NEG^Negative mg/dL    Bilirubin Urine Small (A) NEG^Negative      Comment:      This is an unconfirmed screening test result. A positive result may be false.    Ketones Urine Trace (A) NEG^Negative mg/dL    Specific Gravity Urine >1.030 1.003 - 1.035    Blood Urine  Small (A) NEG^Negative    pH Urine 5.5 5.0 - 7.0 pH    Protein Albumin Urine Trace (A) NEG^Negative mg/dL    Urobilinogen Urine 0.2 0.2 - 1.0 EU/dL    Nitrite Urine Negative NEG^Negative    Leukocyte Esterase Urine Negative NEG^Negative    Source Midstream Urine    Urine Microscopic   Result Value Ref Range    WBC Urine 0 - 5 OTO5^0 - 5 /HPF    RBC Urine O - 2 OTO2^O - 2 /HPF    Squamous Epithelial /LPF Urine Moderate (A) FEW^Few /LPF    Bacteria Urine Moderate (A) NEG^Negative /HPF    Calcium Oxalate Many (A) NEG^Negative /HPF   Lipid panel reflex to direct LDL Fasting   Result Value Ref Range    Cholesterol 259 (H) <200 mg/dL      Comment:      Desirable:       <200 mg/dl    Triglycerides 245 (H) <150 mg/dL      Comment:      Borderline high:  150-199 mg/dl  High:             200-499 mg/dl  Very high:       >499 mg/dl  Non Fasting      HDL Cholesterol 62 >49 mg/dL    LDL Cholesterol Calculated 148 (H) <100 mg/dL      Comment:      Above desirable:  100-129 mg/dl  Borderline High:  130-159 mg/dL  High:             160-189 mg/dL  Very high:       >189 mg/dl      Non HDL Cholesterol 197 (H) <130 mg/dL      Comment:      Above Desirable:  130-159 mg/dl  Borderline high:  160-189 mg/dl  High:             190-219 mg/dl  Very high:       >219 mg/dl     Urine Culture Aerobic Bacterial   Result Value Ref Range    Specimen Description Urine     Culture Micro       Referred to [facility], await final report.   Comprehensive metabolic panel   Result Value Ref Range    Sodium 143 133 - 144 mmol/L    Potassium 4.3 3.4 - 5.3 mmol/L    Chloride 108 94 - 109 mmol/L    Carbon Dioxide 29 20 - 32 mmol/L    Anion Gap 6 3 - 14 mmol/L    Glucose 90 70 - 99 mg/dL      Comment:      Non Fasting    Urea Nitrogen 13 7 - 30 mg/dL    Creatinine 0.81 0.52 - 1.04 mg/dL    GFR Estimate 75 >60 mL/min/1.7m2      Comment:      Non  GFR Calc    GFR Estimate If Black >90 >60 mL/min/1.7m2       Comment:       GFR Calc    Calcium 9.0 8.5 - 10.1 mg/dL    Bilirubin Total 0.4 0.2 - 1.3 mg/dL    Albumin 3.5 3.4 - 5.0 g/dL    Protein Total 7.0 6.8 - 8.8 g/dL    Alkaline Phosphatase 137 40 - 150 U/L    ALT 67 (H) 0 - 50 U/L    AST 45 0 - 45 U/L       If you have any questions or concerns, please call the clinic at the number listed above.       Sincerely,        Emi Shirley, APRN CNP/nr

## 2018-06-28 NOTE — MR AVS SNAPSHOT
After Visit Summary   6/28/2018    Swetha Patterson    MRN: [id number]           Patient Information     Date Of Birth          1970        Visit Information        Provider Department      6/28/2018 12:50 PM HWXR1 [facility] [place]        Today's Diagnoses     Chronic obstructive pulmonary disease, unspecified COPD type (H)        Cough           Follow-ups after your visit        Your next 10 appointments already scheduled     Jun 29, 2018  2:30 PM CDT   US HEAD NECK SOFT TISSUE with URUS1   [facility], Ultrasound ([facility])    [address] [facility] [place] [phone]           Please bring a list of your medicines (including vitamins, minerals and over-the-counter drugs). Also, tell your doctor about any allergies you may have. Wear comfortable clothes and leave your valuables at home.  You do not need to do anything special to prepare for your exam.  Please call the Imaging Department at your exam site with any questions.            Jul 05, 2018 11:00 AM CDT   SHORT with LEIGH Wilkinson CNP   [facility] [place] ([facility] [place])    [address] [place] [phone]            Aug 02, 2018 12:30 PM CDT   (Arrive by 12:15 PM)   NEW FEMALE PELVIC with Bhavani See Daniel Tanner MD   [facility] ([facility])    [address] [place] [address] [id number] [phone]            Aug 13, 2018  9:00 AM CDT   FULL PULMONARY FUNCTION with  PFL [facility] Pulmonary Function Testing ([facility])    [address] [place] [id number] [phone]            Aug 13, 2018 10:00 AM CDT   (Arrive by 9:45 AM)   Return Visit with Carol Jones MD   [facility] ([facility])  "   [address] [facility]  [address] [place] [phone]              Future tests that were ordered for you today     Open Future Orders        Priority Expected Expires Ordered    US Head Neck Soft Tissue Routine  2019            Who to contact     If you have questions or need follow up information about today's clinic visit or your schedule please contact [facility] directly at [phone].  Normal or non-critical lab and imaging results will be communicated to you by [employer], letter or phone within 4 business days after the clinic has received the results. If you do not hear from us within 7 days, please contact the clinic through [employer] or phone. If you have a critical or abnormal lab result, we will notify you by phone as soon as possible.  Submit refill requests through [employer] or call your pharmacy and they will forward the refill request to us. Please allow 3 business days for your refill to be completed.          Additional Information About Your Visit        [employer] Information     [employer] lets you send messages to your doctor, view your test results, renew your prescriptions, schedule appointments and more. To sign up, go to [email or web address] . Click on \"Log in\" on the left side of the screen, which will take you to the Welcome page. Then click on \"Sign up Now\" on the right side of the page.     You will be asked to enter the access code listed below, as well as some personal information. Please follow the directions to create your username and password.     Your access code is: [email or web address]  Expires: 2018  1:06 PM     Your access code will  in 90 days. If you need help or a new code, please call your [place] clinic or [phone].        Care EveryWhere ID     This is your Care EveryWhere ID. This could be used by other organizations to access your [place] medical records  [id number]        Your Vitals Were     Last Period       "             04/15/2016            Blood Pressure from Last 3 Encounters:   06/28/18 131/76   05/02/18 121/53   04/26/18 104/72    Weight from Last 3 Encounters:   06/28/18 199 lb 8 oz (90.5 kg)   05/01/18 207 lb (93.9 kg)   04/26/18 208 lb (94.3 kg)              We Performed the Following     XR Chest 2 Views          Today's Medication Changes          These changes are accurate as of 6/28/18  1:16 PM.  If you have any questions, ask your nurse or doctor.               Start taking these medicines.        Dose/Directions    nicotine polacrilex 2 MG gum   Commonly known as:  NICORETTE   Used for:  Tobacco abuse   Started by:  Emi Shirley APRN CNP        Dose:  2 mg   Place 1 each (2 mg) inside cheek as needed for smoking cessation   Quantity:  100 tablet   Refills:  3       order for DME   Used for:  Chronic obstructive pulmonary disease, unspecified COPD type (H), Moderate persistent asthma without complication   Started by:  Emi Shirely APRN CNP        Equipment being ordered: Nebulizer   Quantity:  1 Units   Refills:  0       predniSONE 20 MG tablet   Commonly known as:  DELTASONE   Used for:  Chronic obstructive pulmonary disease, unspecified COPD type (H), Cough   Started by:  Emi Shirley APRN CNP        Dose:  20 mg   Take 1 tablet (20 mg) by mouth 2 times daily   Quantity:  10 tablet   Refills:  0       tamsulosin 0.4 MG capsule   Commonly known as:  FLOMAX   Used for:  Bladder spasm, Decreased urination   Started by:  Emi Shirley APRN CNP        Dose:  0.4 mg   Take 1 capsule (0.4 mg) by mouth daily   Quantity:  14 capsule   Refills:  0       tiotropium 18 MCG capsule   Commonly known as:  SPIRIVA HANDIHALER   Used for:  Chronic obstructive pulmonary disease, unspecified COPD type (H)   Started by:  Emi Shirley APRN CNP        Inhale contents of one capsule daily.   Quantity:  90 capsule   Refills:  3       varenicline 0.5 MG X 11 & 1 MG X 42 tablet    Commonly known as:  CHANTIX STARTING MONTH PAK   Used for:  Tobacco abuse   Started by:  Emi Shirley APRN CNP        Take 0.5 mg tab daily for 3 days, then 0.5 mg tab twice daily for 4 days, then 1 mg twice daily.   Quantity:  53 tablet   Refills:  0         These medicines have changed or have updated prescriptions.        Dose/Directions    * albuterol 108 (90 Base) MCG/ACT Inhaler   Commonly known as:  VENTOLIN HFA   This may have changed:  Another medication with the same name was added. Make sure you understand how and when to take each.   Used for:  Shortness of breath   Changed by:  Emi Shirley APRN CNP        Dose:  2 puff   Inhale 2 puffs into the lungs every 6 hours   Quantity:  3 Inhaler   Refills:  3       * albuterol (2.5 MG/3ML) 0.083% neb solution   This may have changed:  You were already taking a medication with the same name, and this prescription was added. Make sure you understand how and when to take each.   Used for:  Chronic obstructive pulmonary disease, unspecified COPD type (H), Cough   Changed by:  Emi Shirley APRN CNP        Dose:  2.5 mg   Take 1 vial (2.5 mg) by nebulization every 6 hours as needed for shortness of breath / dyspnea or wheezing   Quantity:  25 vial   Refills:  3       * Notice:  This list has 2 medication(s) that are the same as other medications prescribed for you. Read the directions carefully, and ask your doctor or other care provider to review them with you.      Stop taking these medicines if you haven't already. Please contact your care team if you have questions.     fluconazole 150 MG tablet   Commonly known as:  DIFLUCAN   Stopped by:  Emi Shirley APRN CNP                Where to get your medicines      These medications were sent to [facility] [place], [place] - [address]  [address], [facility] [address]     Phone:  [phone]     albuterol (2.5 MG/3ML) 0.083% neb solution     nicotine polacrilex 2 MG gum    predniSONE 20 MG tablet    tamsulosin 0.4 MG capsule    tiotropium 18 MCG capsule    varenicline 0.5 MG X 11 & 1 MG X 42 tablet         Some of these will need a paper prescription and others can be bought over the counter.  Ask your nurse if you have questions.     Bring a paper prescription for each of these medications     order for DME                Primary Care Provider Office Phone # Fax #    Emi Sihrley, LEIGH [facility] [phone]       [address]  [facility] [address]        Equal Access to Services     : Hadii aad ku hadasho Soomaali, waaxda luqadaha, qaybta kaalmada adeegyatori, waxzoran cowan . So [facility] [phone].    ATENCIÓN: Si habla español, tiene a vargas disposición servicios gratuitos de asistencia lingüística. [facility] [phone].    We comply with applicable federal civil rights laws and [place] laws. We do not discriminate on the basis of race, color, national origin, age, disability, sex, sexual orientation, or gender identity.            Thank you!     Thank you for choosing [facility]  for your care. Our goal is always to provide you with excellent care. Hearing back from our patients is one way we can continue to improve our services. Please take a few minutes to complete the written survey that you may receive in the mail after your visit with us. Thank you!             Your Updated Medication List - Protect others around you: Learn how to safely use, store and throw away your medicines at www.disposemymeds.org.          This list is accurate as of 6/28/18  1:16 PM.  Always use your most recent med list.                   Brand Name Dispense Instructions for use Diagnosis    acetaminophen 325 MG tablet    TYLENOL    50 tablet    Take 2 tablets (650 mg) by mouth every 6 hours as needed for other (mild pain)    S/P cervical spinal fusion       * albuterol 108 (90 Base) MCG/ACT Inhaler     VENTOLIN HFA    3 Inhaler    Inhale 2 puffs into the lungs every 6 hours    Shortness of breath       * albuterol (2.5 MG/3ML) 0.083% neb solution     25 vial    Take 1 vial (2.5 mg) by nebulization every 6 hours as needed for shortness of breath / dyspnea or wheezing    Chronic obstructive pulmonary disease, unspecified COPD type (H), Cough       atorvastatin 80 MG tablet    LIPITOR    30 tablet    Take 1 tablet (80 mg) by mouth daily    Hyperlipidemia LDL goal <130       budesonide-formoterol 160-4.5 MCG/ACT Inhaler    SYMBICORT    3 Inhaler    Inhale 2 puffs into the lungs 2 times daily    Severe chronic obstructive pulmonary disease (H)       furosemide 20 MG tablet    LASIX    30 tablet    Take 1 tablet (20 mg) by mouth daily as needed    Bilateral leg edema       hydrOXYzine 50 MG tablet    ATARAX    60 tablet    Take 1 tablet (50 mg) by mouth 4 times daily as needed for anxiety Increased dose. For anxiety    Anxiety       ipratropium - albuterol 0.5 mg/2.5 mg/3 mL 0.5-2.5 (3) MG/3ML neb solution    DUONEB    360 mL    Take 1 vial (3 mLs) by nebulization every 6 hours    Pulmonary emphysema, unspecified emphysema type (H)       lisinopril 20 MG tablet    PRINIVIL/ZESTRIL    90 tablet    Take 1 tablet (20 mg) by mouth daily    Essential hypertension with goal blood pressure less than 140/90       montelukast 10 MG tablet    SINGULAIR    30 tablet    Take 1 tablet (10 mg) by mouth At Bedtime    Dust allergy, Severe chronic obstructive pulmonary disease (H)       nicotine polacrilex 2 MG gum    NICORETTE    100 tablet    Place 1 each (2 mg) inside cheek as needed for smoking cessation    Tobacco abuse       omeprazole 20 MG CR capsule    priLOSEC    60 capsule    Take 1 capsule (20 mg) by mouth 2 times daily    Acute gastritis, presence of bleeding unspecified, unspecified gastritis type       ondansetron 4 MG ODT tab    ZOFRAN-ODT    4 tablet    Take 1 tablet (4 mg) by mouth every 6 hours as needed for nausea  Dissolve ON the tongue.    S/P cervical spinal fusion       ondansetron 8 MG tablet    ZOFRAN    15 tablet    Take 0.5-1 tablets (4-8 mg) by mouth every 8 hours as needed for nausea    Nausea       order for DME     1 Device    Equipment being ordered: Nebulizer machine Length of need-lifetime    Chronic lung disease, Cough, Severe persistent asthma with acute exacerbation       order for DME     1 Units    Equipment being ordered: Nebulizer    Chronic obstructive pulmonary disease, unspecified COPD type (H), Moderate persistent asthma without complication       oxyCODONE IR 5 MG tablet    ROXICODONE    30 tablet    Take 1-2 tablets (5-10 mg) by mouth every 3 hours as needed for pain or other (Moderate to Severe)    S/P cervical spinal fusion       predniSONE 20 MG tablet    DELTASONE    10 tablet    Take 1 tablet (20 mg) by mouth 2 times daily    Chronic obstructive pulmonary disease, unspecified COPD type (H), Cough       tamsulosin 0.4 MG capsule    FLOMAX    14 capsule    Take 1 capsule (0.4 mg) by mouth daily    Bladder spasm, Decreased urination       tiotropium 18 MCG capsule    SPIRIVA HANDIHALER    90 capsule    Inhale contents of one capsule daily.    Chronic obstructive pulmonary disease, unspecified COPD type (H)       varenicline 0.5 MG X 11 & 1 MG X 42 tablet    CHANTIX STARTING MONTH CHAPITO    53 tablet    Take 0.5 mg tab daily for 3 days, then 0.5 mg tab twice daily for 4 days, then 1 mg twice daily.    Tobacco abuse       VITAMIN D (CHOLECALCIFEROL) PO      Take 2,000 Units by mouth daily        * Notice:  This list has 2 medication(s) that are the same as other medications prescribed for you. Read the directions carefully, and ask your doctor or other care provider to review them with you.

## 2018-06-28 NOTE — PATIENT INSTRUCTIONS
1.  Xray negative for lung infection.  For breathing start prednisone 1 pill twice a day for 5 days.  Continue symbicort and singulair.  Continue albuterol inhaler as needed for cough.  Restart spiriva, I sent this in.    2.  Urine does not look like infection.  I wonder about a stone.  Start flomax once a day for 7 days.  Check kidneys today.  Follow up with urology if not improving.  If you are unable to pee or you develop severe pain or fever you need to go to the ER.    3.  Will schedule follow up with pulmonology today to reestablish, I am unclear about the history of your lung disease, I do not see a cancer history listed    4.  sent in chantix and nicotine gum.  Follow up immediately if mood changes.    5.  Ultrasound of mass in face and neck, I do not feel them on exam    6.  See me in 1 week

## 2018-06-29 ENCOUNTER — PRE VISIT (OUTPATIENT)
Dept: UROLOGY | Facility: CLINIC | Age: 48
End: 2018-06-29

## 2018-06-29 LAB
ALBUMIN SERPL-MCNC: 3.5 G/DL (ref 3.4–5)
ALP SERPL-CCNC: 137 U/L (ref 40–150)
ALT SERPL W P-5'-P-CCNC: 67 U/L (ref 0–50)
ANION GAP SERPL CALCULATED.3IONS-SCNC: 6 MMOL/L (ref 3–14)
AST SERPL W P-5'-P-CCNC: 45 U/L (ref 0–45)
BACTERIA SPEC CULT: NORMAL
BILIRUB SERPL-MCNC: 0.4 MG/DL (ref 0.2–1.3)
BUN SERPL-MCNC: 13 MG/DL (ref 7–30)
CALCIUM SERPL-MCNC: 9 MG/DL (ref 8.5–10.1)
CHLORIDE SERPL-SCNC: 108 MMOL/L (ref 94–109)
CHOLEST SERPL-MCNC: 259 MG/DL
CO2 SERPL-SCNC: 29 MMOL/L (ref 20–32)
CREAT SERPL-MCNC: 0.81 MG/DL (ref 0.52–1.04)
GFR SERPL CREATININE-BSD FRML MDRD: 75 ML/MIN/1.7M2
GLUCOSE SERPL-MCNC: 90 MG/DL (ref 70–99)
HDLC SERPL-MCNC: 62 MG/DL
LDLC SERPL CALC-MCNC: 148 MG/DL
NONHDLC SERPL-MCNC: 197 MG/DL
POTASSIUM SERPL-SCNC: 4.3 MMOL/L (ref 3.4–5.3)
PROT SERPL-MCNC: 7 G/DL (ref 6.8–8.8)
SODIUM SERPL-SCNC: 143 MMOL/L (ref 133–144)
SPECIMEN SOURCE: NORMAL
TRIGL SERPL-MCNC: 245 MG/DL

## 2018-06-29 NOTE — PROGRESS NOTES
Please send out result letter with the following note, thanks.    Ramone Milian,    Normal kidney function, liver function, and blood sugar.      The results of your recent lipid (cholesterol) profile were abnormal.     Desired or goal lipid levels are:  CHOLESTEROL: Desirable is less than 200.  HDL (Good Cholesterol): Desirable is greater than 40 in men and greater than 50 in women.  LDL (Bad Cholesterol): Desirable is less than 130 or less than 100 in patients with diabetes or vascular disease. For some patients with diabetes or vascular disease, the desireable LDL is less than 70.  TRIGLYCERIDES: Desirable is less than 150.      As you may know, an elevated cholesterol is one factor that increases your risk for heart disease and stroke.  Cholesterol can be somewhat influenced by diet but there is a large genetic component as well.    The Mediterranean diet has some evidence for improving cholesterol and reducing cardiovascular risk.  Such a diet is typically high in fruits, vegetables, whole grains, beans, nuts, and seeds and includes olive oil as an important source of fat; there are typically low to moderate amounts of fish, poultry, and dairy products, and there is little red meat.    Also consider starting or increasing your aerobic activity.  Aerobic activity is the best way to improve HDL (good) cholesterol.  If this would be new to you, please talk with me first about what activities are safe for you.        -Emi Shirley, CNP

## 2018-06-29 NOTE — TELEPHONE ENCOUNTER
MEDICAL RECORDS REQUEST   [facility]  Urology Clinic  [address] [place] [address]  PHONE: [phone]  Fax: [phone]        FUTURE VISIT INFORMATION                                                   Swetha Patterson, : 1970 scheduled for future visit at [facility]    APPOINTMENT INFORMATION:    Date: 2018    Provider:  LEEANN ORTIZ    Reason for Visit/Diagnosis: DIFFICULTY URINATING    REFERRAL INFORMATION:    Referring provider:  SELF    Specialty: SELF    Referring providers clinic:  SELF    Clinic contact number:  SELF    RECORDS REQUESTED FOR VISIT                                                     NOTES  STATUS/DETAILS   OFFICE NOTE from referring provider  yes   OFFICE NOTE from other specialist  no   DISCHARGE SUMMARY from hospital  no   DISCHARGE REPORT from the ER  no   OPERATIVE REPORT  no   MEDICATION LIST  yes       PRE-VISIT CHECKLIST      Record collection complete Yes  RECORDS IN EPIC    Appointment appropriately scheduled           (right time/right provider) Yes   MyChart activation Yes   Questionnaire complete If no, please explain IN PROCESS     Completed by: Leeann Chase

## 2018-07-17 DIAGNOSIS — F41.9 ANXIETY: ICD-10-CM

## 2018-07-17 NOTE — TELEPHONE ENCOUNTER
"Requested Prescriptions   Pending Prescriptions Disp Refills     hydrOXYzine (ATARAX) 50 MG tablet 60 tablet 0     Sig: Take 1 tablet (50 mg) by mouth 4 times daily as needed for anxiety Increased dose. For anxiety    Antihistamines Protocol Passed    7/17/2018  1:29 PM       Passed - Recent (12 mo) or future (30 days) visit within the authorizing provider's specialty    Patient had office visit in the last 12 months or has a visit in the next 30 days with authorizing provider or within the authorizing provider's specialty.  See \"Patient Info\" tab in inbasket, or \"Choose Columns\" in Meds & Orders section of the refill encounter.           Passed - Patient is age 3 or older    Apply age and/or weight-based dosing for peds patients age 3 and older.    Forward request to provider for patients under the age of 3.          Last rx 6/26/18 for #60--too early?  "

## 2018-07-23 RX ORDER — HYDROXYZINE HYDROCHLORIDE 50 MG/1
50 TABLET, FILM COATED ORAL 4 TIMES DAILY PRN
Qty: 60 TABLET | Refills: 0 | Status: SHIPPED | OUTPATIENT
Start: 2018-07-23 | End: 2018-08-15

## 2018-07-23 RX ORDER — MULTIVIT-MIN/IRON/FOLIC ACID/K 18-600-40
2000 CAPSULE ORAL DAILY
Qty: 30 CAPSULE | Refills: 3 | Status: SHIPPED | OUTPATIENT
Start: 2018-07-23 | End: 2019-06-24

## 2018-07-23 NOTE — TELEPHONE ENCOUNTER
Patient is out of her hydroxyzine and is now asking also for Vit D also 2,000 units daily.  Wants rxs this time to go to [facility].  SERJIO Chandra R.N.

## 2018-07-30 ENCOUNTER — APPOINTMENT (OUTPATIENT)
Dept: CT IMAGING | Facility: CLINIC | Age: 48
End: 2018-07-30
Attending: EMERGENCY MEDICINE
Payer: MEDICARE

## 2018-07-30 ENCOUNTER — APPOINTMENT (OUTPATIENT)
Dept: MRI IMAGING | Facility: CLINIC | Age: 48
End: 2018-07-30
Attending: EMERGENCY MEDICINE
Payer: MEDICARE

## 2018-07-30 ENCOUNTER — HOSPITAL ENCOUNTER (EMERGENCY)
Facility: CLINIC | Age: 48
Discharge: HOME | End: 2018-07-30
Attending: EMERGENCY MEDICINE | Admitting: EMERGENCY MEDICINE
Payer: MEDICARE

## 2018-07-30 ENCOUNTER — APPOINTMENT (OUTPATIENT)
Dept: GENERAL RADIOLOGY | Facility: CLINIC | Age: 48
End: 2018-07-30
Attending: EMERGENCY MEDICINE
Payer: MEDICARE

## 2018-07-30 VITALS
TEMPERATURE: 98.5 F | OXYGEN SATURATION: 98 % | RESPIRATION RATE: 20 BRPM | SYSTOLIC BLOOD PRESSURE: 126 MMHG | DIASTOLIC BLOOD PRESSURE: 85 MMHG | HEART RATE: 82 BPM

## 2018-07-30 DIAGNOSIS — R51.9 NONINTRACTABLE HEADACHE, UNSPECIFIED CHRONICITY PATTERN, UNSPECIFIED HEADACHE TYPE: ICD-10-CM

## 2018-07-30 DIAGNOSIS — S16.1XXA STRAIN OF NECK MUSCLE, INITIAL ENCOUNTER: ICD-10-CM

## 2018-07-30 LAB
ANION GAP SERPL CALCULATED.3IONS-SCNC: 8 MMOL/L (ref 3–14)
BASOPHILS # BLD AUTO: 0 10E9/L (ref 0–0.2)
BASOPHILS NFR BLD AUTO: 0.4 %
BUN SERPL-MCNC: 18 MG/DL (ref 7–30)
CALCIUM SERPL-MCNC: 8.9 MG/DL (ref 8.5–10.1)
CHLORIDE SERPL-SCNC: 107 MMOL/L (ref 94–109)
CO2 SERPL-SCNC: 25 MMOL/L (ref 20–32)
CREAT SERPL-MCNC: 0.71 MG/DL (ref 0.52–1.04)
DIFFERENTIAL METHOD BLD: NORMAL
EOSINOPHIL # BLD AUTO: 0 10E9/L (ref 0–0.7)
EOSINOPHIL NFR BLD AUTO: 0 %
ERYTHROCYTE [DISTWIDTH] IN BLOOD BY AUTOMATED COUNT: 14.8 % (ref 10–15)
GFR SERPL CREATININE-BSD FRML MDRD: 87 ML/MIN/1.7M2
GLUCOSE SERPL-MCNC: 95 MG/DL (ref 70–99)
HCT VFR BLD AUTO: 45.8 % (ref 35–47)
HGB BLD-MCNC: 14.9 G/DL (ref 11.7–15.7)
IMM GRANULOCYTES # BLD: 0.1 10E9/L (ref 0–0.4)
IMM GRANULOCYTES NFR BLD: 1 %
LYMPHOCYTES # BLD AUTO: 2.9 10E9/L (ref 0.8–5.3)
LYMPHOCYTES NFR BLD AUTO: 34 %
MCH RBC QN AUTO: 30.3 PG (ref 26.5–33)
MCHC RBC AUTO-ENTMCNC: 32.5 G/DL (ref 31.5–36.5)
MCV RBC AUTO: 93 FL (ref 78–100)
MONOCYTES # BLD AUTO: 0.4 10E9/L (ref 0–1.3)
MONOCYTES NFR BLD AUTO: 5 %
NEUTROPHILS # BLD AUTO: 5 10E9/L (ref 1.6–8.3)
NEUTROPHILS NFR BLD AUTO: 59.6 %
NRBC # BLD AUTO: 0 10*3/UL
NRBC BLD AUTO-RTO: 0 /100
PLATELET # BLD AUTO: 382 10E9/L (ref 150–450)
POTASSIUM SERPL-SCNC: 4.6 MMOL/L (ref 3.4–5.3)
RBC # BLD AUTO: 4.92 10E12/L (ref 3.8–5.2)
SODIUM SERPL-SCNC: 140 MMOL/L (ref 133–144)
WBC # BLD AUTO: 8.4 10E9/L (ref 4–11)

## 2018-07-30 PROCEDURE — 25000128 H RX IP 250 OP 636: Performed by: RADIOLOGY

## 2018-07-30 PROCEDURE — 80048 BASIC METABOLIC PNL TOTAL CA: CPT | Performed by: EMERGENCY MEDICINE

## 2018-07-30 PROCEDURE — 25000132 ZZH RX MED GY IP 250 OP 250 PS 637: Mod: GY | Performed by: EMERGENCY MEDICINE

## 2018-07-30 PROCEDURE — 96375 TX/PRO/DX INJ NEW DRUG ADDON: CPT | Mod: 59

## 2018-07-30 PROCEDURE — 25000128 H RX IP 250 OP 636: Performed by: EMERGENCY MEDICINE

## 2018-07-30 PROCEDURE — 96376 TX/PRO/DX INJ SAME DRUG ADON: CPT | Mod: 59

## 2018-07-30 PROCEDURE — 99285 EMERGENCY DEPT VISIT HI MDM: CPT | Mod: 25

## 2018-07-30 PROCEDURE — 70553 MRI BRAIN STEM W/O & W/DYE: CPT

## 2018-07-30 PROCEDURE — 72125 CT NECK SPINE W/O DYE: CPT

## 2018-07-30 PROCEDURE — 96374 THER/PROPH/DIAG INJ IV PUSH: CPT | Mod: 59

## 2018-07-30 PROCEDURE — 71046 X-RAY EXAM CHEST 2 VIEWS: CPT

## 2018-07-30 PROCEDURE — 85025 COMPLETE CBC W/AUTO DIFF WBC: CPT | Performed by: EMERGENCY MEDICINE

## 2018-07-30 PROCEDURE — A9585 GADOBUTROL INJECTION: HCPCS | Performed by: RADIOLOGY

## 2018-07-30 PROCEDURE — 72128 CT CHEST SPINE W/O DYE: CPT

## 2018-07-30 PROCEDURE — A9270 NON-COVERED ITEM OR SERVICE: HCPCS | Mod: GY | Performed by: EMERGENCY MEDICINE

## 2018-07-30 RX ORDER — ONDANSETRON 2 MG/ML
4 INJECTION INTRAMUSCULAR; INTRAVENOUS ONCE
Status: DISCONTINUED | OUTPATIENT
Start: 2018-07-30 | End: 2018-07-30 | Stop reason: HOSPADM

## 2018-07-30 RX ORDER — DIPHENHYDRAMINE HYDROCHLORIDE 50 MG/ML
25 INJECTION INTRAMUSCULAR; INTRAVENOUS ONCE
Status: COMPLETED | OUTPATIENT
Start: 2018-07-30 | End: 2018-07-30

## 2018-07-30 RX ORDER — METHOCARBAMOL 750 MG/1
750 TABLET, FILM COATED ORAL ONCE
Status: COMPLETED | OUTPATIENT
Start: 2018-07-30 | End: 2018-07-30

## 2018-07-30 RX ORDER — GADOBUTROL 604.72 MG/ML
9 INJECTION INTRAVENOUS ONCE
Status: COMPLETED | OUTPATIENT
Start: 2018-07-30 | End: 2018-07-30

## 2018-07-30 RX ORDER — METOCLOPRAMIDE HYDROCHLORIDE 5 MG/ML
10 INJECTION INTRAMUSCULAR; INTRAVENOUS ONCE
Status: COMPLETED | OUTPATIENT
Start: 2018-07-30 | End: 2018-07-30

## 2018-07-30 RX ORDER — HYDROMORPHONE HYDROCHLORIDE 1 MG/ML
0.5 INJECTION, SOLUTION INTRAMUSCULAR; INTRAVENOUS; SUBCUTANEOUS
Status: DISCONTINUED | OUTPATIENT
Start: 2018-07-30 | End: 2018-07-30 | Stop reason: HOSPADM

## 2018-07-30 RX ORDER — METHOCARBAMOL 750 MG/1
750 TABLET, FILM COATED ORAL 3 TIMES DAILY PRN
Qty: 30 TABLET | Refills: 0 | Status: SHIPPED | OUTPATIENT
Start: 2018-07-30 | End: 2018-11-20

## 2018-07-30 RX ORDER — KETOROLAC TROMETHAMINE 15 MG/ML
15 INJECTION, SOLUTION INTRAMUSCULAR; INTRAVENOUS ONCE
Status: COMPLETED | OUTPATIENT
Start: 2018-07-30 | End: 2018-07-30

## 2018-07-30 RX ORDER — OXYCODONE AND ACETAMINOPHEN 5; 325 MG/1; MG/1
1 TABLET ORAL EVERY 6 HOURS PRN
Qty: 10 TABLET | Refills: 0 | Status: SHIPPED | OUTPATIENT
Start: 2018-07-30 | End: 2018-08-22

## 2018-07-30 RX ADMIN — METOCLOPRAMIDE 10 MG: 5 INJECTION, SOLUTION INTRAMUSCULAR; INTRAVENOUS at 10:53

## 2018-07-30 RX ADMIN — Medication 0.5 MG: at 10:52

## 2018-07-30 RX ADMIN — DIPHENHYDRAMINE HYDROCHLORIDE 25 MG: 50 INJECTION, SOLUTION INTRAMUSCULAR; INTRAVENOUS at 10:52

## 2018-07-30 RX ADMIN — GADOBUTROL 9 ML: 604.72 INJECTION INTRAVENOUS at 11:36

## 2018-07-30 RX ADMIN — KETOROLAC TROMETHAMINE 15 MG: 15 INJECTION, SOLUTION INTRAMUSCULAR; INTRAVENOUS at 12:47

## 2018-07-30 RX ADMIN — Medication 0.5 MG: at 12:47

## 2018-07-30 RX ADMIN — METHOCARBAMOL 750 MG: 750 TABLET ORAL at 10:53

## 2018-07-30 ASSESSMENT — ENCOUNTER SYMPTOMS
HEADACHES: 1
NECK PAIN: 1
SPEECH DIFFICULTY: 1
NAUSEA: 1
BACK PAIN: 1
VOMITING: 0

## 2018-07-30 NOTE — ED AVS SNAPSHOT
[facility] Emergency Department    [address]    [facility] [address]    Phone:  [phone]    Fax:  [phone]                                       Swetha Patterson   MRN: [id number]    Department:  [facility] Emergency Department   Date of Visit:  7/30/2018           After Visit Summary Signature Page     I have received my discharge instructions, and my questions have been answered. I have discussed any challenges I see with this plan with the nurse or doctor.    ..........................................................................................................................................  Patient/Patient Representative Signature      ..........................................................................................................................................  Patient Representative Print Name and Relationship to Patient    ..................................................               ................................................  Date                                            Time    ..........................................................................................................................................  Reviewed by Signature/Title    ...................................................              ..............................................  Date                                                            Time

## 2018-07-30 NOTE — ED TRIAGE NOTES
Recent neck surgery and just got out of brae. States she slipped Thursday falling down the stairs and injured her neck. Pain goes into mid back. Hurts to turn head. C/O double vision and slurred speech 2 days ago.

## 2018-07-30 NOTE — ED AVS SNAPSHOT
[facility] Emergency Department    [address] [facility] [address]    Phone:  [phone]    Fax:  [phone]                                       Swetha Patterson   MRN: [id number]    Department:  [facility] Emergency Department   Date of Visit:  7/30/2018           Patient Information     Date Of Birth          1970        Your diagnoses for this visit were:     Strain of neck muscle, initial encounter     Nonintractable headache, unspecified chronicity pattern, unspecified headache type        You were seen by Rashmi Steiner MD.      Follow-up Information     Schedule an appointment as soon as possible for a visit with Osmany Eric MD.    Specialty:  Orthopedics    Contact information:    [facility] ORTHOPEDICS  [address] [facility] [address]  [facility] [address]  [phone]          Follow up with [facility] Emergency Department.    Specialty:  EMERGENCY MEDICINE    Why:  If symptoms worsen    Contact information:    [address] [facility] [address] [phone]        Discharge Instructions       Take ibuprofen 600 mg 3x per day.  This will provide both pain control and fight against inflammation.   You were given a prescription for Robaxin.  This is a muscle relaxant medication.  Use this as needed for additional pain control.  You were given a prescription for pain medication.  Use this if ibuprofen and robaxin are not working.    Discharge Instructions  Neck Strain    You have been seen today for a neck sprain or strain.  Neck strains usually result from an injury to the neck. Car accidents, contact sports, and falls are common causes of neck strain. Sometimes your neck can start to hurt because of increased activity, muscle tension, an abnormal sleeping position, or because of other problems like arthritis in the neck.     Neck pain usually comes from injured muscles and ligaments. Sometimes there is a  herniated ( slipped ) disc. We do not usually do MRI scans to look for these right away, since most herniated discs will get better on their own with time. Today, we did not find any evidence that your neck pain was caused by a serious or dangerous condition. However, sometimes symptoms develop over time and cannot be found during an emergency visit, so it is very important that you follow up with your primary provider.    Generally, every Emergency Department visit should have a follow-up clinic visit with either a primary or a specialty clinic/provider. Please follow-up as instructed by your emergency provider today.    Return to the Emergency Department if:    You have increasing pain in your neck.    You develop difficulty swallowing or breathing.    You have numbness, weakness, or trouble moving your arms or legs.    You have severe dizziness and difficulty walking.    You are unable to control your bladder or bowels.    You develop severe headache or ringing in the ears.    What can I do to help myself at home?    If you had an injury, use cold for the first 1-2 days. Cold helps relieve pain and reduce inflammation.  Apply ice packs to the neck or areas of pain every 1-2 hours for 20 minutes at a time. Place a towel or cloth between your skin and the ice pack.    After the first 2 days, using heat can help with neck pain and stiffness. You may use a warm shower or bath, warm towels on the neck, or a heating pad. Do not sleep with a heating pad, as you can be burned.     Pain medications - You may take a pain medication such as Tylenol  (acetaminophen), Advil  and Motrin  (ibuprofen), or Aleve  (naproxen).    It is usually best to rest the neck for 1-2 days after an injury, then start gentle stretching exercises.     It is helpful to place a small pillow under the nape of your neck to provide proper neutral positioning.     You should stay active and do your usual work as much as you can, unless this involves  heavy physical labor. Ask your provider if you need work restrictions.  If you were given a prescription for medicine here today, be sure to read all of the information (including the package insert) that comes with your prescription.  This will include important information about the medicine, its side effects, and any warnings that you need to know about.  The pharmacist who fills the prescription can provide more information and answer questions you may have about the medicine.  If you have questions or concerns that the pharmacist cannot address, please call or return to the Emergency Department.   Remember that you can always come back to the Emergency Department if you are not able to see your regular provider in the amount of time listed above, if you get any new symptoms, or if there is anything that worries you.      Opioid Medication Information    You have been given a prescription for an opioid (narcotic) pain medicine and/or have received a pain medicine while here in the Emergency Department. These medicines can make you drowsy or impaired. You must not drive, operate dangerous equipment, or engage in any other dangerous activities while taking these medications. If you drive while taking these medications, you could be arrested for driving under the influence (DUI). Do not drink any alcohol while you are taking these medications.     Opioid pain medications can cause addiction. If you have a history of chemical dependency of any type, you are at a higher risk of becoming addicted to pain medications.  Only take these prescribed medications to treat your pain when all other options have been tried. Take it for as short a time and as few doses as possible. Store your pain pills in a secure place, as they are frequently stolen and provide a dangerous opportunity for children or visitors in your house to start abusing these powerful medications. We will not replace any lost or stolen medicine.    If you  do not finish your medication, it is a good idea to get rid of it but please do not flush it down the toilet. Please dispose of the remaining medication at a local pharmacy or law enforcement facility. The [place] Pollution Control Agency has additional information on medication disposal: https://www.pca.state.[place]/living-green/managing-unwanted-medications.      Many prescription pain medications contain Tylenol  (acetaminophen), including Vicodin , Tylenol #3 , Norco , Lortab , and Percocet .  You should not take any extra pills of Tylenol  if you are using these prescription medications or you can get very sick.  Do not ever take more than 3000 mg of acetaminophen in any 24 hour period.    All opioids tend to cause constipation. Drink plenty of water and eat foods that have a lot of fiber, such as fruits, vegetables, prune juice, apple juice and high fiber cereal.  Take a laxative if you don t move your bowels at least every other day. Miralax , Milk of Magnesia, Colace , or Senna  can be used to keep you regular.              Your next 10 appointments already scheduled     Aug 02, 2018 12:30 PM CDT   (Arrive by 12:15 PM)   NEW FEMALE PELVIC with Bhavani Tanner MD   [facility] ([facility])    [address] [place] [address]  4th [facility] [id number] [phone]            Aug 09, 2018  9:20 AM CDT   SHORT with Roro Chawla MD   [facility] ([facility])    [address]  [facility] [id number] [phone]            Aug 13, 2018  9:00 AM CDT   FULL PULMONARY FUNCTION with  [facility] ([facility])    [address] [facility]  3rd [facility] [address] [phone]            Aug 13, 2018 10:00 AM CDT   (Arrive by 9:45 AM)   Return Visit with Carol Jones MD   [facility]  [facility] ([facility])    [address] [facility]  [address]  [facility] [place] [address] [phone]              24 Hour Appointment Hotline       To make an appointment at any [facility], call [phone] ([phone]). If you don't have a family doctor or clinic, we will help you find one. [facility] clinics are conveniently located to serve the needs of you and your family.             Review of your medicines      START taking        Dose / Directions Last dose taken    methocarbamol 750 MG tablet   Commonly known as:  ROBAXIN   Dose:  750 mg   Quantity:  30 tablet        Take 1 tablet (750 mg) by mouth 3 times daily as needed for muscle spasms   Refills:  0        oxyCODONE-acetaminophen 5-325 MG per tablet   Commonly known as:  PERCOCET   Dose:  1 tablet   Quantity:  10 tablet        Take 1 tablet by mouth every 6 hours as needed for moderate to severe pain   Refills:  0          Our records show that you are taking the medicines listed below. If these are incorrect, please call your family doctor or clinic.        Dose / Directions Last dose taken    acetaminophen 325 MG tablet   Commonly known as:  TYLENOL   Dose:  650 mg   Quantity:  50 tablet        Take 2 tablets (650 mg) by mouth every 6 hours as needed for other (mild pain)   Refills:  0        * albuterol 108 (90 Base) MCG/ACT Inhaler   Commonly known as:  VENTOLIN HFA   Dose:  2 puff   Quantity:  3 Inhaler        Inhale 2 puffs into the lungs every 6 hours   Refills:  3        * albuterol (2.5 MG/3ML) 0.083% neb solution   Dose:  2.5 mg   Quantity:  25 vial        Take 1 vial (2.5 mg) by nebulization every 6 hours as needed for shortness of breath / dyspnea or wheezing   Refills:  3        atorvastatin 80 MG tablet   Commonly known as:  LIPITOR   Dose:  80 mg   Quantity:  30 tablet        Take 1 tablet (80 mg) by mouth daily   Refills:  0        budesonide-formoterol 160-4.5 MCG/ACT Inhaler   Commonly known as:   SYMBICORT   Dose:  2 puff   Quantity:  3 Inhaler        Inhale 2 puffs into the lungs 2 times daily   Refills:  1        furosemide 20 MG tablet   Commonly known as:  LASIX   Dose:  20 mg   Quantity:  30 tablet        Take 1 tablet (20 mg) by mouth daily as needed   Refills:  0        hydrOXYzine 50 MG tablet   Commonly known as:  ATARAX   Dose:  50 mg   Quantity:  60 tablet        Take 1 tablet (50 mg) by mouth 4 times daily as needed for anxiety Increased dose. For anxiety   Refills:  0        ipratropium - albuterol 0.5 mg/2.5 mg/3 mL 0.5-2.5 (3) MG/3ML neb solution   Commonly known as:  DUONEB   Dose:  1 vial   Quantity:  360 mL        Take 1 vial (3 mLs) by nebulization every 6 hours   Refills:  0        lisinopril 20 MG tablet   Commonly known as:  PRINIVIL/ZESTRIL   Dose:  20 mg   Quantity:  90 tablet        Take 1 tablet (20 mg) by mouth daily   Refills:  1        montelukast 10 MG tablet   Commonly known as:  SINGULAIR   Dose:  10 mg   Quantity:  30 tablet        Take 1 tablet (10 mg) by mouth At Bedtime   Refills:  0        nicotine polacrilex 2 MG gum   Commonly known as:  NICORETTE   Dose:  2 mg   Quantity:  100 tablet        Place 1 each (2 mg) inside cheek as needed for smoking cessation   Refills:  3        omeprazole 20 MG CR capsule   Commonly known as:  priLOSEC   Dose:  20 mg   Quantity:  60 capsule        Take 1 capsule (20 mg) by mouth 2 times daily   Refills:  0        ondansetron 4 MG ODT tab   Commonly known as:  ZOFRAN-ODT   Dose:  4 mg   Quantity:  4 tablet        Take 1 tablet (4 mg) by mouth every 6 hours as needed for nausea Dissolve ON the tongue.   Refills:  0        ondansetron 8 MG tablet   Commonly known as:  ZOFRAN   Dose:  4-8 mg   Quantity:  15 tablet        Take 0.5-1 tablets (4-8 mg) by mouth every 8 hours as needed for nausea   Refills:  0        order for DME   Quantity:  1 Device        Equipment being ordered: Nebulizer machine Length of need-lifetime   Refills:  0         order for DME   Quantity:  1 Units        Equipment being ordered: Nebulizer   Refills:  0        oxyCODONE IR 5 MG tablet   Commonly known as:  ROXICODONE   Dose:  5-10 mg   Quantity:  30 tablet        Take 1-2 tablets (5-10 mg) by mouth every 3 hours as needed for pain or other (Moderate to Severe)   Refills:  0        predniSONE 20 MG tablet   Commonly known as:  DELTASONE   Dose:  20 mg   Quantity:  10 tablet        Take 1 tablet (20 mg) by mouth 2 times daily   Refills:  0        tamsulosin 0.4 MG capsule   Commonly known as:  FLOMAX   Dose:  0.4 mg   Quantity:  14 capsule        Take 1 capsule (0.4 mg) by mouth daily   Refills:  0        tiotropium 18 MCG capsule   Commonly known as:  SPIRIVA HANDIHALER   Quantity:  90 capsule        Inhale contents of one capsule daily.   Refills:  3        varenicline 0.5 MG X 11 & 1 MG X 42 tablet   Commonly known as:  CHANTIX STARTING MONTH CHAPITO   Quantity:  53 tablet        Take 0.5 mg tab daily for 3 days, then 0.5 mg tab twice daily for 4 days, then 1 mg twice daily.   Refills:  0        * VITAMIN D (CHOLECALCIFEROL) PO   Dose:  2000 Units        Take 2,000 Units by mouth daily   Refills:  0        * vitamin D 2000 units Caps   Dose:  2000 Units   Quantity:  30 capsule        Take 2,000 Units by mouth daily   Refills:  3        * Notice:  This list has 4 medication(s) that are the same as other medications prescribed for you. Read the directions carefully, and ask your doctor or other care provider to review them with you.            Information about OPIOIDS     PRESCRIPTION OPIOIDS: WHAT YOU NEED TO KNOW   We gave you an opioid (narcotic) pain medicine. It is important to manage your pain, but opioids are not always the best choice. You should first try all the other options your care team gave you. Take this medicine for as short a time (and as few doses) as possible.     These medicines have risks:    DO NOT drive when on new or higher doses of pain medicine. These  medicines can affect your alertness and reaction times, and you could be arrested for driving under the influence (DUI). If you need to use opioids long-term, talk to your care team about driving.    DO NOT operate heave machinery    DO NOT do any other dangerous activities while taking these medicines.     DO NOT drink any alcohol while taking these medicines.      If the opioid prescribed includes acetaminophen, DO NOT take with any other medicines that contain acetaminophen. Read all labels carefully. Look for the word  acetaminophen  or  Tylenol.  Ask your pharmacist if you have questions or are unsure.    You can get addicted to pain medicines, especially if you have a history of addiction (chemical, alcohol or substance dependence). Talk to your care team about ways to reduce this risk.    Store your pills in a secure place, locked if possible. We will not replace any lost or stolen medicine. If you don t finish your medicine, please throw away (dispose) as directed by your pharmacist. The [place] Pollution Control Agency has more information about safe disposal: https://www.pca.[facility].[place]/living-green/managing-unwanted-medications.     All opioids tend to cause constipation. Drink plenty of water and eat foods that have a lot of fiber, such as fruits, vegetables, prune juice, apple juice and high-fiber cereal. Take a laxative (Miralax, milk of magnesia, Colace, Senna) if you don t move your bowels at least every other day.         Prescriptions were sent or printed at these locations (2 Prescriptions)                   Other Prescriptions                Printed at Department/Unit printer (2 of 2)         methocarbamol (ROBAXIN) 750 MG tablet               oxyCODONE-acetaminophen (PERCOCET) 5-325 MG per tablet                Procedures and tests performed during your visit     Basic metabolic panel    CBC + differential    CT Thoracic Spine w/o Contrast    Cervical spine CT w/o contrast    MR Brain w/o & w  Contrast    XR Chest 2 Views      Orders Needing Specimen Collection     None      Pending Results     Date and Time Order Name Status Description    7/30/2018 1046 CT Thoracic Spine w/o Contrast Preliminary             Pending Culture Results     No orders found from 7/28/2018 to 7/31/2018.            Pending Results Instructions     If you had any lab results that were not finalized at the time of your Discharge, you can call the ED Lab Result RN at [phone]. You will be contacted by this team for any positive Lab results or changes in treatment. The nurses are available 7 days a week from 10A to 6:30P.  You can leave a message 24 hours per day and they will return your call.        Test Results From Your Hospital Stay        7/30/2018 12:11 PM      Narrative     CT OF THE THORACIC SPINE WITHOUT CONTRAST   7/30/2018 12:04 PM     COMPARISON: None.    HISTORY:  Back pain, fall.      TECHNIQUE: Axial CT images of the thoracic spine were acquired without  intravenous contrast. Multiplanar reformations were created from the  axial source images.      FINDINGS:  There is normal alignment of the thoracic vertebrae.  Vertebral body heights of the thoracic spine are normal. There are no  fractures of the thoracic spine. There is no spinal canal or neural  foraminal stenosis of the thoracic spine. There is degenerative  endplate spurring anteriorly at the T3-T4, T4-T5, T5-T6, T6-T7, T7-T8,  T8-T9 and T9-T10 levels.        Impression     IMPRESSION: Minimal degenerative changes of the thoracic spine. No  evidence for fracture or traumatic malalignment.      Radiation dose for this scan was reduced using automated exposure  control, adjustment of the mA and/or kV according to patient size, or  iterative reconstruction technique.         7/30/2018 12:37 PM      Narrative     CT CERVICAL SPINE WITHOUT CONTRAST   7/30/2018 12:08 PM     HISTORY: Fall, neck pain, recent cervical fusion C5-C6.       TECHNIQUE: Axial images of  the cervical spine were obtained without  intravenous contrast. Multiplanar reformations were performed.   Radiation dose for this scan was reduced using automated exposure  control, adjustment of the mA and/or kV according to patient size, or  iterative reconstruction technique.    COMPARISON: Cervical spine MRI 6/20/2017.    FINDINGS: There is severe motion artifact which is most severe at the  level of C5-C6. No grossly displaced fracture is identified.  Postoperative change of anterior fusion at C5-C6 is demonstrated with  evidence of interbody graft incorporation most conspicuous along the  left side of the C5-C6 disc joint. Severe bilateral foraminal stenosis  is present at C5-C6 with disc osteophyte complex which results in  moderate-to-severe spinal canal narrowing. Intervertebral disc and  facet joints are otherwise aligned. There is slight straightening and  loss of the normal lordosis. No evidence of acute traumatic disc  herniation or epidural hematoma. Paraspinal soft tissues are  unremarkable.        Impression     IMPRESSION: Motion limited exam without evidence of gross fracture.     WEST WAGNER MD         7/30/2018 12:35 PM      Narrative     XR CHEST 2 VW 7/30/2018 12:34 PM    HISTORY: chest pain after fall;         Impression     IMPRESSION: Negative.    RUPAL PURDY MD         7/30/2018 11:56 AM      Narrative     MRI BRAIN WITHOUT AND WITH CONTRAST  7/30/2018 11:40 AM    HISTORY:  Vision change, speech change, recent fall, headaches.      TECHNIQUE:  Multiplanar, multisequence MRI of the brain without and  with 9 mL Gadavist.    COMPARISON: MRI 5/16/2018.    FINDINGS: Ventricles and sulci are within normal limits for age. Focal  right posterior frontal white matter linear T2 hyperintensity is  unchanged. No corresponding enhancement or diffusion restriction. The  cerebral hemispheres, brainstem, and cerebellum otherwise demonstrate  normal morphology and signal. No abnormal enhancement or  diffusion  restriction is identified. No evidence of ischemia, mass effect, or  hydrocephalus. The visualized calvarium, skull base, paranasal sinuses  are unremarkable. Enlarged right lateral retropharyngeal lymph node is  likely reactive.        Impression     IMPRESSION:  1. No acute abnormality. No evidence of ischemia, hemorrhage, or  hydrocephalus.  2. No change of nonspecific linear T2 hyperintensity within the right  frontal white matter.    WEST WAGNER MD         7/30/2018 11:14 AM      Component Results     Component Value Ref Range & Units Status    WBC 8.4 4.0 - 11.0 10e9/L Final    RBC Count 4.92 3.8 - 5.2 10e12/L Final    Hemoglobin 14.9 11.7 - 15.7 g/dL Final    Hematocrit 45.8 35.0 - 47.0 % Final    MCV 93 78 - 100 fl Final    MCH 30.3 26.5 - 33.0 pg Final    MCHC 32.5 31.5 - 36.5 g/dL Final    RDW 14.8 10.0 - 15.0 % Final    Platelet Count 382 150 - 450 10e9/L Final    Diff Method Automated Method  Final    % Neutrophils 59.6 % Final    % Lymphocytes 34.0 % Final    % Monocytes 5.0 % Final    % Eosinophils 0.0 % Final    % Basophils 0.4 % Final    % Immature Granulocytes 1.0 % Final    Nucleated RBCs 0 0 /100 Final    Absolute Neutrophil 5.0 1.6 - 8.3 10e9/L Final    Absolute Lymphocytes 2.9 0.8 - 5.3 10e9/L Final    Absolute Monocytes 0.4 0.0 - 1.3 10e9/L Final    Absolute Eosinophils 0.0 0.0 - 0.7 10e9/L Final    Absolute Basophils 0.0 0.0 - 0.2 10e9/L Final    Abs Immature Granulocytes 0.1 0 - 0.4 10e9/L Final    Absolute Nucleated RBC 0.0  Final         7/30/2018 11:52 AM      Component Results     Component Value Ref Range & Units Status    Sodium 140 133 - 144 mmol/L Final    Potassium 4.6 3.4 - 5.3 mmol/L Final    Specimen slightly hemolyzed, potassium may be falsely elevated    Chloride 107 94 - 109 mmol/L Final    Carbon Dioxide 25 20 - 32 mmol/L Final    Anion Gap 8 3 - 14 mmol/L Final    Glucose 95 70 - 99 mg/dL Final    Urea Nitrogen 18 7 - 30 mg/dL Final    Creatinine 0.71 0.52 -  1.04 mg/dL Final    GFR Estimate 87 >60 mL/min/1.7m2 Final    Non  GFR Calc    GFR Estimate If Black >90 >60 mL/min/1.7m2 Final    African American GFR Calc    Calcium 8.9 8.5 - 10.1 mg/dL Final                Clinical Quality Measure: Blood Pressure Screening     Your blood pressure was checked while you were in the emergency department today. The last reading we obtained was  BP: (!) 127/97 . Please read the guidelines below about what these numbers mean and what you should do about them.  If your systolic blood pressure (the top number) is less than 120 and your diastolic blood pressure (the bottom number) is less than 80, then your blood pressure is normal. There is nothing more that you need to do about it.  If your systolic blood pressure (the top number) is 120-139 or your diastolic blood pressure (the bottom number) is 80-89, your blood pressure may be higher than it should be. You should have your blood pressure rechecked within a year by a primary care provider.  If your systolic blood pressure (the top number) is 140 or greater or your diastolic blood pressure (the bottom number) is 90 or greater, you may have high blood pressure. High blood pressure is treatable, but if left untreated over time it can put you at risk for heart attack, stroke, or kidney failure. You should have your blood pressure rechecked by a primary care provider within the next 4 weeks.  If your provider in the emergency department today gave you specific instructions to follow-up with your doctor or provider even sooner than that, you should follow that instruction and not wait for up to 4 weeks for your follow-up visit.        Thank you for choosing [facility]       Thank you for choosing [facility] for your care. Our goal is always to provide you with excellent care. Hearing back from our patients is one way we can continue to improve our services. Please take a few minutes to complete the written survey that you may  "receive in the mail after you visit with us. Thank you!        [employer] Information     [employer] lets you send messages to your doctor, view your test results, renew your prescriptions, schedule appointments and more. To sign up, go to [email or web address] . Click on \"Log in\" on the left side of the screen, which will take you to the Welcome page. Then click on \"Sign up Now\" on the right side of the page.     You will be asked to enter the access code listed below, as well as some personal information. Please follow the directions to create your username and password.     Your access code is: [email or web address]  Expires: 2018  1:06 PM     Your access code will  in 90 days. If you need help or a new code, please call your [facility] or [phone].        Care EveryWhere ID     This is your Care EveryWhere ID. This could be used by other organizations to access your [facility] medical records  [id number]        Equal Access to Services     CHARITY MANDUJANO : Hadii skinny clarko Sozbigniew, waaxda luqadaha, qaybta kaalmada adejason, caryl cowan . So [facility] [phone].    ATENCIÓN: Si habla español, tiene a vargas disposición servicios gratuitos de asistencia lingüística. Llame al [phone].    We comply with applicable federal civil rights laws and [place] laws. We do not discriminate on the basis of race, color, national origin, age, disability, sex, sexual orientation, or gender identity.            After Visit Summary       This is your record. Keep this with you and show to your community pharmacist(s) and doctor(s) at your next visit.                  "

## 2018-07-30 NOTE — DISCHARGE INSTRUCTIONS
Take ibuprofen 600 mg 3x per day.  This will provide both pain control and fight against inflammation.   You were given a prescription for Robaxin.  This is a muscle relaxant medication.  Use this as needed for additional pain control.  You were given a prescription for pain medication.  Use this if ibuprofen and robaxin are not working.    Discharge Instructions  Neck Strain    You have been seen today for a neck sprain or strain.  Neck strains usually result from an injury to the neck. Car accidents, contact sports, and falls are common causes of neck strain. Sometimes your neck can start to hurt because of increased activity, muscle tension, an abnormal sleeping position, or because of other problems like arthritis in the neck.     Neck pain usually comes from injured muscles and ligaments. Sometimes there is a herniated ( slipped ) disc. We do not usually do MRI scans to look for these right away, since most herniated discs will get better on their own with time. Today, we did not find any evidence that your neck pain was caused by a serious or dangerous condition. However, sometimes symptoms develop over time and cannot be found during an emergency visit, so it is very important that you follow up with your primary provider.    Generally, every Emergency Department visit should have a follow-up clinic visit with either a primary or a specialty clinic/provider. Please follow-up as instructed by your emergency provider today.    Return to the Emergency Department if:    You have increasing pain in your neck.    You develop difficulty swallowing or breathing.    You have numbness, weakness, or trouble moving your arms or legs.    You have severe dizziness and difficulty walking.    You are unable to control your bladder or bowels.    You develop severe headache or ringing in the ears.    What can I do to help myself at home?    If you had an injury, use cold for the first 1-2 days. Cold helps relieve pain and  reduce inflammation.  Apply ice packs to the neck or areas of pain every 1-2 hours for 20 minutes at a time. Place a towel or cloth between your skin and the ice pack.    After the first 2 days, using heat can help with neck pain and stiffness. You may use a warm shower or bath, warm towels on the neck, or a heating pad. Do not sleep with a heating pad, as you can be burned.     Pain medications - You may take a pain medication such as Tylenol  (acetaminophen), Advil  and Motrin  (ibuprofen), or Aleve  (naproxen).    It is usually best to rest the neck for 1-2 days after an injury, then start gentle stretching exercises.     It is helpful to place a small pillow under the nape of your neck to provide proper neutral positioning.     You should stay active and do your usual work as much as you can, unless this involves heavy physical labor. Ask your provider if you need work restrictions.  If you were given a prescription for medicine here today, be sure to read all of the information (including the package insert) that comes with your prescription.  This will include important information about the medicine, its side effects, and any warnings that you need to know about.  The pharmacist who fills the prescription can provide more information and answer questions you may have about the medicine.  If you have questions or concerns that the pharmacist cannot address, please call or return to the Emergency Department.   Remember that you can always come back to the Emergency Department if you are not able to see your regular provider in the amount of time listed above, if you get any new symptoms, or if there is anything that worries you.      Opioid Medication Information    You have been given a prescription for an opioid (narcotic) pain medicine and/or have received a pain medicine while here in the Emergency Department. These medicines can make you drowsy or impaired. You must not drive, operate dangerous equipment,  or engage in any other dangerous activities while taking these medications. If you drive while taking these medications, you could be arrested for driving under the influence (DUI). Do not drink any alcohol while you are taking these medications.     Opioid pain medications can cause addiction. If you have a history of chemical dependency of any type, you are at a higher risk of becoming addicted to pain medications.  Only take these prescribed medications to treat your pain when all other options have been tried. Take it for as short a time and as few doses as possible. Store your pain pills in a secure place, as they are frequently stolen and provide a dangerous opportunity for children or visitors in your house to start abusing these powerful medications. We will not replace any lost or stolen medicine.    If you do not finish your medication, it is a good idea to get rid of it but please do not flush it down the toilet. Please dispose of the remaining medication at a local pharmacy or law enforcement facility. The [place] [employer] has additional information on medication disposal: [email or web address]://[email or web address].[email or web address]/living-green/managing-unwanted-medications.      Many prescription pain medications contain Tylenol  (acetaminophen), including Vicodin , Tylenol #3 , Norco , Lortab , and Percocet .  You should not take any extra pills of Tylenol  if you are using these prescription medications or you can get very sick.  Do not ever take more than 3000 mg of acetaminophen in any 24 hour period.    All opioids tend to cause constipation. Drink plenty of water and eat foods that have a lot of fiber, such as fruits, vegetables, prune juice, apple juice and high fiber cereal.  Take a laxative if you don t move your bowels at least every other day. Miralax , Milk of Magnesia, Colace , or Senna  can be used to keep you regular.

## 2018-07-30 NOTE — ED PROVIDER NOTES
History     Chief Complaint:  Neck pain    HPI   Swetha Patterson is a 48 year old female with a history of chronic pain status post C5-6 Anterior cervical discectomy and fusion (performed on 5/1/18 by Dr. Osmany Eric), on chronic prednisone who presents to the Emergency Department for evaluation of neck pain. The patient suffered a mechanical trip and fall down the stairs last Thursday, hitting the back of her head on the stairs. There was no loss of consciousness. Since her fall she has experienced pain from the top of her neck down to the mid back and across her shoulders bilaterally. She also reports having slurred speech, drooling, headaches and nausea as well as double vision that began on Saturday evening. Pain is worse with movement or deep breathing and she has been taken Aleve without any improvement.. The patient was seen by her eye doctor the day after her fall and was told her vision was getting worse. She denies any radiating pain down her arms, numbness, vomiting, no loss of bladder or bowel control. No use of anticoagulants. No history of stroke.      Allergies:  Codeine  Hydrocodone  Sulfa Drugs  Gabapentin     Medications:    albuterol (2.5 MG/3ML) 0.083% neb solution  albuterol (VENTOLIN HFA) 108 (90 BASE) MCG/ACT Inhaler  atorvastatin (LIPITOR) 80 MG tablet  budesonide-formoterol (SYMBICORT) 160-4.5 MCG/ACT Inhaler  Cholecalciferol (VITAMIN D) 2000 units CAPS  furosemide (LASIX) 20 MG tablet  hydroxyzine (ATARAX) 50 MG tablet  ipratropium - albuterol 0.5 mg/2.5 mg/3 mL (DUO NEB) 0.5-2.5 (3) MG/3ML neb solution  lisinopril (PRINIVIL/ZESTRIL) 20 MG tablet  montelukast (SINGULAIR) 10 MG tablet  nicotine polacrilex (NICORETTE) 2 MG gum  omeprazole (PRILOSEC) 20 MG CR capsule  ondansetron (ZOFRAN) 8 MG tablet  ondansetron (ZOFRAN-ODT) 4 MG ODT tab  oxycodone IR (ROXICODONE) 5 MG tablet  prednisone (DELTASONE) 20 MG tablet  tamsulosin (FLOMAX) 0.4 MG capsule  tiotropium (SPIRIVA HANDIHALER) 18 MCG  capsule  varenicline (CHANTIX STARTING MONTH PAK) 0.5 MG X 11 & 1 MG X 42 tablet  VITAMIN D, CHOLECALCIFEROL, PO    Past Medical History:    Anxiety state  Asthma   Chronic pain  Depression   Contact dermatitis and other eczema, due to unspecified cause   COPD (chronic obstructive pulmonary disease)   Depressive disorder, not elsewhere classified   Emphysema with chronic bronchitis    Esophageal reflux   Family history of ischemic heart disease   Gastro-oesophageal reflux disease   History of emphysema   Hoarseness   Hypertension   Hyperlipidemia    Liver disease   Chronic pain   Pneumonia   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   Gastrointestinal: Positive for nausea. Negative for vomiting.   Musculoskeletal: Positive for back pain and neck pain.   Neurological: Positive for speech difficulty and headaches.   All other systems reviewed and are negative.    Physical Exam   First Vitals:  BP: (!) 147/102  Pulse: 82  Heart Rate: 82  Temp: 98.5  F (36.9  C)  Resp: 20  SpO2: 99 %    Physical Exam  Gen: alert  HEENT: PERRL, oropharynx clear, no intraoral laceration or dental trauma, no mandibular tenderness, no trismus  Neck: Full AROM, diffuse Paraspinous tenderness, diffusemidline tenderness  CV: RRR, no murmurs, 2+ pulses in all extremities  Chest wall: no crepitus, no tenderness  Pulm: breath sounds equal, lungs clear  Abd:  Soft, no tenderness  Back: diffuse thoracic midline and paraspinous tenderness, no lumbar midline tenderness, no lumbar Paraspinous tenderness  RUE: 5/5 grasp, 5/5 finger opposition, 5/5 finger adduction, 5/5 wrist flexion, 5/5 wrist extension, 5/5 elbow flexion, 5/5 elbow extension, 5/5 shoulder abduction, sensation intact.  LUE: 5/5 grasp, 5/5 finger opposition, 5/5 finger adduction, 5/5 wrist flexion, 5/5 wrist extension, 5/5 elbow flexion, 5/5 elbow extension, 5/5 shoulder abduction. Speech is fluid.   RLE: no tenderness, full AROM  LLE: no tenderness, full AROM  Neuro: moves all extremities without focal weakness, sensation grossly intact over all distal extremities, no facial droop, PERRL, EOMI. Speech is fluid. 5/5 strength BLE in hip flexion and ext, knee flexion and ext, plantar and dorsiflexion, and extension of EHL, sensation intact to light touch over all dermatomes of the legs    Emergency Department Course     Imaging:  Radiographic findings were communicated with the patient who voiced understanding of the findings.    MRI Brain with and without contrast:  1. No acute abnormality. No evidence of ischemia, hemorrhage, or  hydrocephalus.  2. No change of nonspecific linear T2 hyperintensity within the right  frontal white matter. As per radiology.     Chest XR:  Negative. As per radiology.     CT Thoracic spine without contrast:  Minimal degenerative changes of the thoracic spine. No  evidence for fracture or traumatic malalignment. As per radiology.     CT Cervical spine without contrast:  Motion limited exam without evidence of gross fracture.  As per radiology.     Laboratory:  CBC: WBC: 8.4, HGB: 14.9, PLT: 382  BMP: WNL (Creat 0.71, glucose 95)    Interventions:  1052 Dilaudid 0.5 mg IV   Benadryl 25 mg IV  1053 Robaxin 750 mg IV   Relgan 10 mg IV  1247 Dilaudid 0.5 mg IV   Toradol 15 mg IV    Emergency Department Course:  Nursing notes and vitals reviewed. I performed an exam of the patient as  documented above.     Blood drawn. This was sent to the lab for further testing, results above.    The patient was sent for a MRI Brain with and without contrast while here in the emergency department, findings above.    The patient was sent for a chest x-ray while here in the emergency department, findings above.    The patient was sent for a CT Thoracic spine without contrast while here in the emergency department, findings above.    The patient was sent for a CT Cervical spine without contrast while here in the emergency department, findings above.    1235 I spoke with radiology regarding this patient. Discussed neck pain with radiologist regarding motion artifact. He states he did not find anything acute there and felt images were sufficient despite the motion.     1302 I reassessed the patient.     Findings and plan explained to the Patient. Patient discharged home with instructions regarding supportive care, medications, and reasons to return. The importance of close follow-up was reviewed.    Impression & Plan      Medical Decision Making:  Swetha Patterson is a 48 year old female who presents with neck and back pain after fall.  Recent spinal fusion noted. Neurologic exam shows no weakness. There are no red flags for neurosurgical emergency or spinal cord injury. CT of the cervical and thoracic spines showed no acute fracture or malalignment. Degenerative changes noted and discussed with the patient. I did discuss the CT results directly with the radiologist given motion artifact.  He felt there was low risk for acute process and did not feel repeat scan was needed to rule out fracture.  Suspect muscle strain and exacerbation of DDD symptoms due to fall. The patient also noted headache and vision changes. MRI is negative for CVA or other acute process.  I did not feel imaging of cervical vessels was indicated as if dissection was present with these symptoms would expect to see stroke on MRI given duration of  symptoms. She states she has had migraine type headaches in the past associated with her neck discomfort. I suspect this is the cause of her symptoms. Headache and pain improved with the above interventions.   Back pain and pain with breathing also thought ot be MSK.  CXR neg for PTX.  Pt low risk for PE and reports fall to explain pain.  PERC negative therefore did not feel further PE eval was indicated.    I discussed the need for outpatient PT through [facility]. Patient will call to arrange this. Discharge home with Robaxin and ibuprofen. Recommended sparing use of narcotics. Given that she is relatively recently post op with new acute fall I did prescribe a small number of opiates. I discussed risks and benefits of these with her. Plan for discharge home.     Diagnosis:    ICD-10-CM    1. Strain of neck muscle, initial encounter S16.1XXA    2. Nonintractable headache, unspecified chronicity pattern, unspecified headache type R51        Disposition:  discharged to home    Discharge Medications:  New Prescriptions    METHOCARBAMOL (ROBAXIN) 750 MG TABLET    Take 1 tablet (750 mg) by mouth 3 times daily as needed for muscle spasms    OXYCODONE-ACETAMINOPHEN (PERCOCET) 5-325 MG PER TABLET    Take 1 tablet by mouth every 6 hours as needed for moderate to severe pain       I, Brianda Arambula, am serving as a scribe on 7/30/2018 at 10:26 AM to personally document services performed by Rashmi Steiner* based on my observations and the provider's statements to me.     Brianda Arambula  7/30/2018   [facility] EMERGENCY DEPARTMENT       Rashmi Steiner MD  07/30/18 [id number]

## 2018-07-31 ENCOUNTER — PRE VISIT (OUTPATIENT)
Dept: UROLOGY | Facility: CLINIC | Age: 48
End: 2018-07-31

## 2018-08-08 ENCOUNTER — ALLIED HEALTH/NURSE VISIT (OUTPATIENT)
Dept: NURSING | Facility: CLINIC | Age: 48
End: 2018-08-08
Payer: MEDICARE

## 2018-08-08 DIAGNOSIS — R21 RASH AND NONSPECIFIC SKIN ERUPTION: Primary | ICD-10-CM

## 2018-08-08 PROCEDURE — 99207 ZZC NO CHARGE NURSE ONLY: CPT

## 2018-08-08 NOTE — MR AVS SNAPSHOT
After Visit Summary   8/8/2018    Swetha Patterson    MRN: [id number]           Patient Information     Date Of Birth          1970        Visit Information        Provider Department      8/8/2018 9:00 AM Radha Myers [facility]        Today's Diagnoses     Rash and nonspecific skin eruption    -  1       Follow-ups after your visit        Your next 10 appointments already scheduled     Aug 09, 2018  9:20 AM CDT   SHORT with Roro Chawla MD   [facility] ([facility])    [address]  [facility] [address]   [phone]            Aug 13, 2018  9:00 AM CDT   FULL PULMONARY FUNCTION with  PFL [facility] ([place])    [address]  3rd Floor  [facility] [address] [phone]            Aug 13, 2018 10:00 AM CDT   (Arrive by 9:45 AM)   Return Visit with Carol Jones MD   [facility] ([facility])    [address]  [address] [place] [address] [phone]              Who to contact     If you have questions or need follow up information about today's clinic visit or your schedule please contact [facility] directly at [phone].  Normal or non-critical lab and imaging results will be communicated to you by MyChart, letter or phone within 4 business days after the clinic has received the results. If you do not hear from us within 7 days, please contact the clinic through MyChart or phone. If you have a critical or abnormal lab result, we will notify you by phone as soon as possible.  Submit refill requests through [employer] or call your pharmacy and they will forward the refill request to us. Please allow 3 business days for your refill to be completed.          Additional Information About Your Visit        [employer] Information     [employer] lets you send  "messages to your doctor, view your test results, renew your prescriptions, schedule appointments and more. To sign up, go to [email or web address] . Click on \"Log in\" on the left side of the screen, which will take you to the Welcome page. Then click on \"Sign up Now\" on the right side of the page.     You will be asked to enter the access code listed below, as well as some personal information. Please follow the directions to create your username and password.     Your access code is: [email or web address]  Expires: 2018  1:06 PM     Your access code will  in 90 days. If you need help or a new code, please call your [place] clinic or [phone].        Care EveryWhere ID     This is your Care EveryWhere ID. This could be used by other organizations to access your [place] medical records  [id number]        Your Vitals Were     Last Period                   04/15/2016            Blood Pressure from Last 3 Encounters:   18 126/85   18 131/76   18 121/53    Weight from Last 3 Encounters:   18 199 lb 8 oz (90.5 kg)   18 207 lb (93.9 kg)   18 208 lb (94.3 kg)              Today, you had the following     No orders found for display       Primary Care Provider Office Phone # Fax #    Emi LEIGH Vidal [facility] [phone]       [address]  [facility] [address]        Equal Access to Services     DEBBIE MANDUJANO AH: Hadii aad ku hadasho Soomaali, waaxda luqadaha, qaybta kaalmada adeegyada, waxay clark cowan . So [facility] [phone].    ATENCIÓN: Si habla español, tiene a vargas disposición servicios gratuitos de asistencia lingüística. Llame al [phone].    We comply with applicable federal civil rights laws and [place] laws. We do not discriminate on the basis of race, color, national origin, age, disability, sex, sexual orientation, or gender identity.            Thank you!     Thank you for choosing [facility]  for " your care. Our goal is always to provide you with excellent care. Hearing back from our patients is one way we can continue to improve our services. Please take a few minutes to complete the written survey that you may receive in the mail after your visit with us. Thank you!             Your Updated Medication List - Protect others around you: Learn how to safely use, store and throw away your medicines at www.disposemymeds.org.          This list is accurate as of 8/8/18  9:32 AM.  Always use your most recent med list.                   Brand Name Dispense Instructions for use Diagnosis    acetaminophen 325 MG tablet    TYLENOL    50 tablet    Take 2 tablets (650 mg) by mouth every 6 hours as needed for other (mild pain)    S/P cervical spinal fusion       ADVAIR DISKUS 250-50 MCG/DOSE diskus inhaler   Generic drug:  fluticasone-salmeterol      Inhale 1 puff into the lungs        * albuterol 108 (90 Base) MCG/ACT Inhaler    VENTOLIN HFA    3 Inhaler    Inhale 2 puffs into the lungs every 6 hours    Shortness of breath       * albuterol (2.5 MG/3ML) 0.083% neb solution     25 vial    Take 1 vial (2.5 mg) by nebulization every 6 hours as needed for shortness of breath / dyspnea or wheezing    Cough       amLODIPine 10 MG tablet    NORVASC     Take 10 mg by mouth        atorvastatin 80 MG tablet    LIPITOR    30 tablet    Take 1 tablet (80 mg) by mouth daily    Hyperlipidemia LDL goal <130       benzoyl peroxide 10 % topical gel           budesonide-formoterol 160-4.5 MCG/ACT Inhaler    SYMBICORT    3 Inhaler    Inhale 2 puffs into the lungs 2 times daily    Severe chronic obstructive pulmonary disease (H)       * cetirizine 10 MG tablet    zyrTEC          * cetirizine HCl 10 MG Caps      Take 10 mg by mouth        chlorhexidine 4 % liquid    HIBICLENS     Wash ab, groin, thighs daily in shower DO NOT PUT ON FACE        cyclobenzaprine 10 MG tablet    FLEXERIL     TK 1 T TID PRN        desonide 0.05 % cream    DESOWEN           diazepam 10 MG tablet    VALIUM     Take 10 mg by mouth        doxepin 10 MG capsule    SINEquan     Take 10 mg by mouth        doxycycline 100 MG tablet    VIBRA-TABS     Take 100 mg by mouth        estradiol 0.1 MG/GM cream    ESTRACE     Place 1 g vaginally        fluconazole 150 MG tablet    DIFLUCAN     Take 150 mg by mouth        fluticasone 50 MCG/ACT spray    FLONASE     Spray 2 sprays in nostril        furosemide 20 MG tablet    LASIX    30 tablet    Take 1 tablet (20 mg) by mouth daily as needed    Bilateral leg edema       hydrOXYzine 50 MG tablet    ATARAX    60 tablet    Take 1 tablet (50 mg) by mouth 4 times daily as needed for anxiety Increased dose. For anxiety    Anxiety       ipratropium - albuterol 0.5 mg/2.5 mg/3 mL 0.5-2.5 (3) MG/3ML neb solution    DUONEB    360 mL    Take 1 vial (3 mLs) by nebulization every 6 hours    Pulmonary emphysema, unspecified emphysema type (H)       ketoconazole 2 % shampoo    NIZORAL          lisinopril 20 MG tablet    PRINIVIL/ZESTRIL    90 tablet    Take 1 tablet (20 mg) by mouth daily    Essential hypertension with goal blood pressure less than 140/90       methocarbamol 750 MG tablet    ROBAXIN    30 tablet    Take 1 tablet (750 mg) by mouth 3 times daily as needed for muscle spasms        montelukast 10 MG tablet    SINGULAIR    30 tablet    Take 1 tablet (10 mg) by mouth At Bedtime    Dust allergy, Severe chronic obstructive pulmonary disease (H)       nicotine polacrilex 2 MG gum    NICORETTE    100 tablet    Place 1 each (2 mg) inside cheek as needed for smoking cessation    Tobacco abuse       omeprazole 20 MG CR capsule    priLOSEC    60 capsule    Take 1 capsule (20 mg) by mouth 2 times daily    Acute gastritis, presence of bleeding unspecified, unspecified gastritis type       ondansetron 4 MG ODT tab    ZOFRAN-ODT    4 tablet    Take 1 tablet (4 mg) by mouth every 6 hours as needed for nausea Dissolve ON the tongue.    S/P cervical spinal  fusion       ondansetron 8 MG tablet    ZOFRAN    15 tablet    Take 0.5-1 tablets (4-8 mg) by mouth every 8 hours as needed for nausea    Nausea       order for DME     1 Device    Equipment being ordered: Nebulizer machine Length of need-lifetime    Chronic lung disease, Cough, Severe persistent asthma with acute exacerbation       order for DME     1 Units    Equipment being ordered: Nebulizer    Moderate persistent asthma without complication       * oxyCODONE IR 10 MG tablet    ROXICODONE     TK 1 T PO Q 6 H PRF MODERATE TO SEVERE PAIN        * oxyCODONE IR 5 MG tablet    ROXICODONE    30 tablet    Take 1-2 tablets (5-10 mg) by mouth every 3 hours as needed for pain or other (Moderate to Severe)    S/P cervical spinal fusion       oxyCODONE-acetaminophen 5-325 MG per tablet    PERCOCET    10 tablet    Take 1 tablet by mouth every 6 hours as needed for moderate to severe pain        predniSONE 20 MG tablet    DELTASONE    10 tablet    Take 1 tablet (20 mg) by mouth 2 times daily    Cough       ranitidine 75 MG tablet    ZANTAC     Take 75 mg by mouth        tamsulosin 0.4 MG capsule    FLOMAX    14 capsule    Take 1 capsule (0.4 mg) by mouth daily    Bladder spasm, Decreased urination       tiotropium 18 MCG capsule    SPIRIVA HANDIHALER    90 capsule    Inhale contents of one capsule daily.        varenicline 0.5 MG X 11 & 1 MG X 42 tablet    CHANTIX STARTING MONTH CHAPITO    53 tablet    Take 0.5 mg tab daily for 3 days, then 0.5 mg tab twice daily for 4 days, then 1 mg twice daily.    Tobacco abuse       * VITAMIN D (CHOLECALCIFEROL) PO      Take 2,000 Units by mouth daily        * vitamin D 2000 units Caps     30 capsule    Take 2,000 Units by mouth daily    Anxiety       * Notice:  This list has 8 medication(s) that are the same as other medications prescribed for you. Read the directions carefully, and ask your doctor or other care provider to review them with you.

## 2018-08-08 NOTE — NURSING NOTE
Swetha Patterson is a 48 year old female who presents with raised rash that is red and painful, now in between buttcocks and groin area. Reports popping area in groin area last night that had browinsh discharge. Patient states she has taken doxycyline in past which cleared it up, but then returns again.    NURSING ASSESSMENT:  Description:  Raised red bumps to waistline, buttocks and groin area that painful.  Onset/duration:  2 days ago on buttocks and today on waistline  Precip. factors:  None   Associated symptoms:  None  Improves/worsens symptoms:  Doxycyline helped in past  Pain scale (0-10)   5/10    Allergies:   Allergies   Allergen Reactions     Codeine      vomiting     Cyclobenzaprine Nausea     Hydrocodone Nausea and Vomiting     Sulfa Drugs Nausea and Vomiting     Nausea     Gabapentin Rash     Hydroxyzine Rash     Face swells, rash, nausea       MEDICATIONS:   Taking medication(s) as prescribed? Yes  Taking over the counter medication(s?) No  Any medication side effects? No significant side effects    Any barriers to taking medication(s) as prescribed?  No  Medication(s) improving/managing symptoms?  Yes  Medication reconciliation completed: Yes      NURSING PLAN: Routed to provider Yes    RECOMMENDED DISPOSITION:  Keep appointment for tomorrow with Dr. Low at 9:20am. May need to follow-up with dermatology.  Will comply with recommendation: Yes  If further questions/concerns or if symptoms do not improve, worsen or new symptoms develop, call your PCP or [facility] as soon as possible.    Patient reports no longer taking Abilify, Lexapro, Norco, Vistaril, lithium, Compazine, tizanidine, Effexor      Guideline used:  Clinical judgement    Ajit Carter RN

## 2018-08-09 DIAGNOSIS — R06.02 SHORTNESS OF BREATH: ICD-10-CM

## 2018-08-09 RX ORDER — ALBUTEROL SULFATE 90 UG/1
2 AEROSOL, METERED RESPIRATORY (INHALATION) EVERY 6 HOURS
Qty: 3 INHALER | Refills: 3 | Status: ON HOLD | OUTPATIENT
Start: 2018-08-09 | End: 2018-12-26

## 2018-08-09 RX ORDER — DOXYCYCLINE HYCLATE 100 MG
100 TABLET ORAL
Status: CANCELLED | OUTPATIENT
Start: 2018-08-09

## 2018-08-09 NOTE — TELEPHONE ENCOUNTER
Patient calls stating that she was suppose to see Dr. Low today, but her medical ride didn't show up and wasn't able to get rescheduled until 8/22/18. Patient states her rash has flared up quit a bit on her waist and bottom and wanting to know if Dr. Low would fill her doxycycline as that's the only thing that helps control it. Also requests refill on albuterol inhaler.    Patient was seen as walk-in yesterday - see Allied Health/Nurse Visit encounter note dated 8/8/18 for details.

## 2018-08-09 NOTE — TELEPHONE ENCOUNTER
Patient called. Stated she got the prescription  For Albuterol, but not the Doxy. Relayed [facility] denied Doxy. Patient then hung up

## 2018-08-09 NOTE — TELEPHONE ENCOUNTER
"Requested Prescriptions   Pending Prescriptions Disp Refills     albuterol (VENTOLIN HFA) 108 (90 Base) MCG/ACT Inhaler  Last Written Prescription Date:  2/9/18  Last Fill Quantity: 3,  # refills: 3   Last office visit: 4/26/2018 with prescribing provider:  No   Future Office Visit:   Next 5 appointments (look out 90 days)     Aug 22, 2018 10:20 AM CDT   SHORT with Roro [facility]la MD   [facility]    [address]  [facility] [address]   [phone]               3 Inhaler 3     Sig: Inhale 2 puffs into the lungs every 6 hours    Asthma Maintenance Inhalers - Anticholinergics Failed    8/9/2018 12:08 PM       Failed - Asthma control assessment score within normal limits in last 6 months    Please review ACT score.          Passed - Patient is age 12 years or older       Passed - Recent (6 mo) or future (30 days) visit within the authorizing provider's specialty    Patient had office visit in the last 6 months or has a visit in the next 30 days with authorizing provider or within the authorizing provider's specialty.  See \"Patient Info\" tab in inbasket, or \"Choose Columns\" in Meds & Orders section of the refill encounter.         Routing refill request to provider for review/approval because:  Labs not current:  ACT last performed 1/23/17           doxycycline (VIBRA-TABS) 100 MG tablet       Sig: Take 1 tablet (100 mg) by mouth    There is no refill protocol information for this order        Routing refill request to provider for review/approval because:  Medication is reported/historical        "

## 2018-08-15 DIAGNOSIS — F41.9 ANXIETY: ICD-10-CM

## 2018-08-15 DIAGNOSIS — I10 ESSENTIAL HYPERTENSION WITH GOAL BLOOD PRESSURE LESS THAN 140/90: Chronic | ICD-10-CM

## 2018-08-15 RX ORDER — HYDROXYZINE HYDROCHLORIDE 50 MG/1
50 TABLET, FILM COATED ORAL 4 TIMES DAILY PRN
Qty: 60 TABLET | Refills: 0 | Status: SHIPPED | OUTPATIENT
Start: 2018-08-15 | End: 2018-08-22
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
## 2018-08-22 ENCOUNTER — OFFICE VISIT (OUTPATIENT)
Dept: INTERNAL MEDICINE | Facility: CLINIC | Age: 48
End: 2018-08-22
Payer: MEDICARE

## 2018-08-22 VITALS
WEIGHT: 196.3 LBS | HEIGHT: 64 IN | BODY MASS INDEX: 33.51 KG/M2 | SYSTOLIC BLOOD PRESSURE: 118 MMHG | HEART RATE: 80 BPM | OXYGEN SATURATION: 99 % | DIASTOLIC BLOOD PRESSURE: 78 MMHG | TEMPERATURE: 98.2 F | RESPIRATION RATE: 16 BRPM

## 2018-08-22 DIAGNOSIS — M54.2 NECK PAIN: ICD-10-CM

## 2018-08-22 DIAGNOSIS — K29.00 ACUTE GASTRITIS, PRESENCE OF BLEEDING UNSPECIFIED, UNSPECIFIED GASTRITIS TYPE: Primary | ICD-10-CM

## 2018-08-22 DIAGNOSIS — L70.0 ACNE VULGARIS: ICD-10-CM

## 2018-08-22 DIAGNOSIS — Z12.31 ENCOUNTER FOR SCREENING MAMMOGRAM FOR BREAST CANCER: ICD-10-CM

## 2018-08-22 DIAGNOSIS — F41.9 ANXIETY: ICD-10-CM

## 2018-08-22 DIAGNOSIS — L73.2 HIDRADENITIS SUPPURATIVA: ICD-10-CM

## 2018-08-22 PROCEDURE — 99214 OFFICE O/P EST MOD 30 MIN: CPT | Performed by: INTERNAL MEDICINE

## 2018-08-22 RX ORDER — DOXYCYCLINE HYCLATE 100 MG
100 TABLET ORAL 2 TIMES DAILY
Qty: 60 TABLET | Refills: 0 | Status: SHIPPED | OUTPATIENT
Start: 2018-08-22 | End: 2019-03-15

## 2018-08-22 RX ORDER — SUCRALFATE 1 G/1
1 TABLET ORAL 4 TIMES DAILY
Qty: 40 TABLET | Refills: 3 | Status: SHIPPED | OUTPATIENT
Start: 2018-08-22 | End: 2018-11-20

## 2018-08-22 RX ORDER — HYDROXYZINE HYDROCHLORIDE 50 MG/1
50-100 TABLET, FILM COATED ORAL 4 TIMES DAILY PRN
Qty: 180 TABLET | Refills: 0 | Status: SHIPPED | OUTPATIENT
Start: 2018-08-22 | End: 2018-12-28

## 2018-08-22 ASSESSMENT — ANXIETY QUESTIONNAIRES
5. BEING SO RESTLESS THAT IT IS HARD TO SIT STILL: SEVERAL DAYS
6. BECOMING EASILY ANNOYED OR IRRITABLE: SEVERAL DAYS
1. FEELING NERVOUS, ANXIOUS, OR ON EDGE: SEVERAL DAYS
IF YOU CHECKED OFF ANY PROBLEMS ON THIS QUESTIONNAIRE, HOW DIFFICULT HAVE THESE PROBLEMS MADE IT FOR YOU TO DO YOUR WORK, TAKE CARE OF THINGS AT HOME, OR GET ALONG WITH OTHER PEOPLE: VERY DIFFICULT
GAD7 TOTAL SCORE: 7
7. FEELING AFRAID AS IF SOMETHING AWFUL MIGHT HAPPEN: SEVERAL DAYS
3. WORRYING TOO MUCH ABOUT DIFFERENT THINGS: SEVERAL DAYS
2. NOT BEING ABLE TO STOP OR CONTROL WORRYING: SEVERAL DAYS

## 2018-08-22 ASSESSMENT — PATIENT HEALTH QUESTIONNAIRE - PHQ9: 5. POOR APPETITE OR OVEREATING: SEVERAL DAYS

## 2018-08-22 NOTE — MR AVS SNAPSHOT
After Visit Summary   8/22/2018    Swetha Patterson    MRN: [id number]           Patient Information     Date Of Birth          1970        Visit Information        Provider Department      8/22/2018 10:20 AM Roro Chawla MD [facility]        Today's Diagnoses     Other chronic pain    -  1    Acute gastritis, presence of bleeding unspecified, unspecified gastritis type        Neck pain        Encounter for screening mammogram for breast cancer        Anxiety        Acne vulgaris          Care Instructions    Plan:  1. Omeprazole 20 mg twice a day or 40 mg daily  2. Sucralfate 1 gram 4 times a day  3. Mammogram ( please call [phone] to schedule it)   4. Doxycycline 100 mg twice a day for 2 weeks and repeat if needed   5. Chlorhexidine solution   6. Cetaphil soap           Follow-ups after your visit        Additional Services     GASTROENTEROLOGY ADULT REF PROCEDURE ONLY Other; [facility] [phone]       Last Lab Result: Creatinine (mg/dL)       Date                     Value                 07/30/2018               0.71             ----------  Body mass index is 33.69 kg/(m^2).     Needed:  No  Language:  English    Patient will be contacted to schedule procedure.     Please be aware that coverage of these services is subject to the terms and limitations of your health insurance plan.  Call member services at your health plan with any benefit or coverage questions.  Any procedures must be performed at a [facility] facility OR coordinated by your clinic's referral office.    Please bring the following with you to your appointment:    (1) Any X-Rays, CTs or MRIs which have been performed.  Contact the facility where they were done to arrange for  prior to your scheduled appointment.    (2) List of current medications   (3) This referral request   (4) Any documents/labs given to you for this referral            PAIN MANAGEMENT REFERRAL        Your provider has referred you to: [facility]: [facility] [place] [phone]   [email or web address]       Please call clinic directly to schedule appointment.    **ANY DIAGNOSTIC TESTS THAT ARE NOT IN EPIC SHOULD BE SENT TO THE PAIN CENTER**    REGARDING OPIOID MEDICATIONS:  The discussion of opioids management, appropriateness of therapy, and dosing will be discussed in patients being seen for evaluation.  The pain management clinics are not long-term prescribing clinics, with transition of prescribing of medications ultimately going back to the referring provider/PCP.  If prescribing is taken over at the pain clinic, it is in actively involved patients whom are appropriate for opioids, urine drug screening is completed, and long-term prescribing plan has been determined.  Therefore, we will not be automatically taking over prescribing at the patient's first visit.  Is this agreeable to you? agrees.     Please be aware that coverage of these services is subject to the terms and limitations of your health insurance plan.  Call member services at your health plan with any benefit or coverage questions.      Please bring the following with you to your appointment:    (1) Any X-Rays, CTs or MRIs which have been performed.  Contact the facility where they were done to arrange for  prior to your scheduled appointment.    (2) List of current medications   (3) This referral request   (4) Any documents/labs given to you for this referral                  Future tests that were ordered for you today     Open Future Orders        Priority Expected Expires Ordered    MA Screening Digital Bilateral Routine  8/22/2019 8/22/2018            Who to contact     If you have questions or need follow up information about today's clinic visit or your schedule please contact [facility] directly at [phone].  Normal or non-critical lab and imaging results will be communicated to you by  "MyChart, letter or phone within 4 business days after the clinic has received the results. If you do not hear from us within 7 days, please contact the clinic through MyChart or phone. If you have a critical or abnormal lab result, we will notify you by phone as soon as possible.  Submit refill requests through [employer] or call your pharmacy and they will forward the refill request to us. Please allow 3 business days for your refill to be completed.          Additional Information About Your Visit        Care EveryWhere ID     This is your Care EveryWhere ID. This could be used by other organizations to access your [facility] medical records  [id number]        Your Vitals Were     Pulse Temperature Respirations Height Last Period Pulse Oximetry    80 98.2  F (36.8  C) (Oral) 16 5' 4\" (1.626 m) 04/15/2016 99%    Breastfeeding? BMI (Body Mass Index)                No 33.69 kg/m2           Blood Pressure from Last 3 Encounters:   08/22/18 118/78   07/30/18 126/85   06/28/18 131/76    Weight from Last 3 Encounters:   08/22/18 196 lb 4.8 oz (89 kg)   06/28/18 199 lb 8 oz (90.5 kg)   05/01/18 207 lb (93.9 kg)              We Performed the Following     GASTROENTEROLOGY ADULT REF PROCEDURE ONLY Other; [facility] [phone]     PAIN MANAGEMENT REFERRAL          Today's Medication Changes          These changes are accurate as of 8/22/18 10:50 AM.  If you have any questions, ask your nurse or doctor.               Start taking these medicines.        Dose/Directions    sucralfate 1 GM tablet   Commonly known as:  CARAFATE   Used for:  Acute gastritis, presence of bleeding unspecified, unspecified gastritis type   Started by:  Roro Chawla MD        Dose:  1 g   Take 1 tablet (1 g) by mouth 4 times daily   Quantity:  40 tablet   Refills:  3         These medicines have changed or have updated prescriptions.        Dose/Directions    cetirizine HCl 10 MG Caps   This may have changed:  Another medication with " the same name was removed. Continue taking this medication, and follow the directions you see here.   Changed by:  Roro Chawla MD        Dose:  10 mg   Take 10 mg by mouth   Refills:  0       doxycycline 100 MG tablet   Commonly known as:  VIBRA-TABS   This may have changed:  when to take this   Used for:  Acne vulgaris   Changed by:  Roro Chawla MD        Dose:  100 mg   Take 1 tablet (100 mg) by mouth 2 times daily   Quantity:  60 tablet   Refills:  0       hydrOXYzine 50 MG tablet   Commonly known as:  ATARAX   This may have changed:  how much to take   Used for:  Anxiety   Changed by:  Roro Chawla MD        Dose:   mg   Take 1-2 tablets ( mg) by mouth 4 times daily as needed for anxiety Increased dose. For anxiety   Quantity:  180 tablet   Refills:  0         Stop taking these medicines if you haven't already. Please contact your care team if you have questions.     ondansetron 4 MG ODT tab   Commonly known as:  ZOFRAN-ODT   Stopped by:  Roro Chawla MD           varenicline 0.5 MG X 11 & 1 MG X 42 tablet   Commonly known as:  CHANTIX STARTING MONTH PAK   Stopped by:  Roro Chawla MD                Where to get your medicines      These medications were sent to [facility] [place], [place] - [facility].  [address] [facility] [address]     Phone:  [phone]     doxycycline 100 MG tablet    hydrOXYzine 50 MG tablet    omeprazole 20 MG CR capsule    sucralfate 1 GM tablet                Primary Care Provider Office Phone # Fax #    Roro Chawla [phone]       [address] [facility] [address]        Equal Access to Services     [facility] DELBERT : Ramon clarko Sozbigniew, waaxda luqadaha, qaybta kaalmada adeegyada, waxay clark saunders. Formerly [facility] [phone].    ATENCIÓN: Si habla español, tiene a vargas  disposición servicios gratuitos de asistencia lingüística. Briseida neal [phone].    We comply with applicable federal civil rights laws and [place] laws. We do not discriminate on the basis of race, color, national origin, age, disability, sex, sexual orientation, or gender identity.            Thank you!     Thank you for choosing [facility]  for your care. Our goal is always to provide you with excellent care. Hearing back from our patients is one way we can continue to improve our services. Please take a few minutes to complete the written survey that you may receive in the mail after your visit with us. Thank you!             Your Updated Medication List - Protect others around you: Learn how to safely use, store and throw away your medicines at www.disposemymeds.org.          This list is accurate as of 8/22/18 10:50 AM.  Always use your most recent med list.                   Brand Name Dispense Instructions for use Diagnosis    acetaminophen 325 MG tablet    TYLENOL    50 tablet    Take 2 tablets (650 mg) by mouth every 6 hours as needed for other (mild pain)    S/P cervical spinal fusion       ADVAIR DISKUS 250-50 MCG/DOSE diskus inhaler   Generic drug:  fluticasone-salmeterol      Inhale 1 puff into the lungs        * albuterol (2.5 MG/3ML) 0.083% neb solution     25 vial    Take 1 vial (2.5 mg) by nebulization every 6 hours as needed for shortness of breath / dyspnea or wheezing    Cough       * albuterol 108 (90 Base) MCG/ACT inhaler    VENTOLIN HFA    3 Inhaler    Inhale 2 puffs into the lungs every 6 hours    Shortness of breath       amLODIPine 10 MG tablet    NORVASC     Take 10 mg by mouth        atorvastatin 80 MG tablet    LIPITOR    30 tablet    Take 1 tablet (80 mg) by mouth daily    Hyperlipidemia LDL goal <130       benzoyl peroxide 10 % topical gel           budesonide-formoterol 160-4.5 MCG/ACT Inhaler    SYMBICORT    3 Inhaler    Inhale 2 puffs into the lungs 2 times  daily    Severe chronic obstructive pulmonary disease (H)       cetirizine HCl 10 MG Caps      Take 10 mg by mouth        chlorhexidine 4 % liquid    HIBICLENS     Wash ab, groin, thighs daily in shower DO NOT PUT ON FACE        cyclobenzaprine 10 MG tablet    FLEXERIL     TK 1 T TID PRN        desonide 0.05 % cream    DESOWEN          diazepam 10 MG tablet    VALIUM     Take 10 mg by mouth        doxepin 10 MG capsule    SINEquan     Take 10 mg by mouth        doxycycline 100 MG tablet    VIBRA-TABS    60 tablet    Take 1 tablet (100 mg) by mouth 2 times daily    Acne vulgaris       estradiol 0.1 MG/GM cream    ESTRACE     Place 1 g vaginally        fluticasone 50 MCG/ACT spray    FLONASE     Spray 2 sprays in nostril        furosemide 20 MG tablet    LASIX    30 tablet    Take 1 tablet (20 mg) by mouth daily as needed    Bilateral leg edema       hydrOXYzine 50 MG tablet    ATARAX    180 tablet    Take 1-2 tablets ( mg) by mouth 4 times daily as needed for anxiety Increased dose. For anxiety    Anxiety       ipratropium - albuterol 0.5 mg/2.5 mg/3 mL 0.5-2.5 (3) MG/3ML neb solution    DUONEB    360 mL    Take 1 vial (3 mLs) by nebulization every 6 hours    Pulmonary emphysema, unspecified emphysema type (H)       ketoconazole 2 % shampoo    NIZORAL          lisinopril 20 MG tablet    PRINIVIL/ZESTRIL    90 tablet    Take 1 tablet (20 mg) by mouth daily    Essential hypertension with goal blood pressure less than 140/90       methocarbamol 750 MG tablet    ROBAXIN    30 tablet    Take 1 tablet (750 mg) by mouth 3 times daily as needed for muscle spasms        montelukast 10 MG tablet    SINGULAIR    30 tablet    Take 1 tablet (10 mg) by mouth At Bedtime    Dust allergy, Severe chronic obstructive pulmonary disease (H)       nicotine polacrilex 2 MG gum    NICORETTE    100 tablet    Place 1 each (2 mg) inside cheek as needed for smoking cessation    Tobacco abuse       omeprazole 20 MG CR capsule    priLOSEC     180 capsule    Take 1 capsule (20 mg) by mouth 2 times daily    Acute gastritis, presence of bleeding unspecified, unspecified gastritis type       ondansetron 8 MG tablet    ZOFRAN    15 tablet    Take 0.5-1 tablets (4-8 mg) by mouth every 8 hours as needed for nausea    Nausea       order for DME     1 Device    Equipment being ordered: Nebulizer machine Length of need-lifetime    Chronic lung disease, Cough, Severe persistent asthma with acute exacerbation       order for DME     1 Units    Equipment being ordered: Nebulizer    Moderate persistent asthma without complication       ranitidine 75 MG tablet    ZANTAC     Take 75 mg by mouth        sucralfate 1 GM tablet    CARAFATE    40 tablet    Take 1 tablet (1 g) by mouth 4 times daily    Acute gastritis, presence of bleeding unspecified, unspecified gastritis type       tamsulosin 0.4 MG capsule    FLOMAX    14 capsule    Take 1 capsule (0.4 mg) by mouth daily    Bladder spasm, Decreased urination       tiotropium 18 MCG capsule    SPIRIVA HANDIHALER    90 capsule    Inhale contents of one capsule daily.        * VITAMIN D (CHOLECALCIFEROL) PO      Take 2,000 Units by mouth daily        * vitamin D 2000 units Caps     30 capsule    Take 2,000 Units by mouth daily    Anxiety       * Notice:  This list has 4 medication(s) that are the same as other medications prescribed for you. Read the directions carefully, and ask your doctor or other care provider to review them with you.

## 2018-08-22 NOTE — PATIENT INSTRUCTIONS
Plan:  1. Omeprazole 20 mg twice a day or 40 mg daily  2. Sucralfate 1 gram 4 times a day  3. Mammogram ( please call [phone] to schedule it)   4. Doxycycline 100 mg twice a day for 2 weeks and repeat if needed   5. Chlorhexidine solution   6. Cetaphil soap
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
## 2018-08-24 ENCOUNTER — TRANSFERRED RECORDS (OUTPATIENT)
Dept: HEALTH INFORMATION MANAGEMENT | Facility: CLINIC | Age: 48
End: 2018-08-24

## 2018-08-24 ASSESSMENT — ANXIETY QUESTIONNAIRES: GAD7 TOTAL SCORE: 7

## 2018-08-24 ASSESSMENT — ASTHMA QUESTIONNAIRES: ACT_TOTALSCORE: 7

## 2018-08-24 ASSESSMENT — PATIENT HEALTH QUESTIONNAIRE - PHQ9: SUM OF ALL RESPONSES TO PHQ QUESTIONS 1-9: 8

## 2018-09-18 ENCOUNTER — TRANSFERRED RECORDS (OUTPATIENT)
Dept: HEALTH INFORMATION MANAGEMENT | Facility: CLINIC | Age: 48
End: 2018-09-18

## 2018-09-26 ENCOUNTER — TELEPHONE (OUTPATIENT)
Dept: INTERNAL MEDICINE | Facility: CLINIC | Age: 48
End: 2018-09-26

## 2018-09-26 DIAGNOSIS — I10 BENIGN ESSENTIAL HYPERTENSION: Primary | ICD-10-CM

## 2018-09-26 DIAGNOSIS — B37.31 YEAST INFECTION OF THE VAGINA: ICD-10-CM

## 2018-09-26 RX ORDER — FLUCONAZOLE 150 MG/1
150 TABLET ORAL
Qty: 4 TABLET | Refills: 0 | Status: SHIPPED | OUTPATIENT
Start: 2018-09-26 | End: 2018-11-21

## 2018-09-26 RX ORDER — AMLODIPINE BESYLATE 10 MG/1
10 TABLET ORAL DAILY
Qty: 90 TABLET | Refills: 1 | Status: SHIPPED | OUTPATIENT
Start: 2018-09-26 | End: 2019-12-12

## 2018-09-26 RX ORDER — FLUCONAZOLE 150 MG/1
150 TABLET ORAL
Qty: 1 TABLET | Status: CANCELLED | OUTPATIENT
Start: 2018-09-26

## 2018-09-26 NOTE — TELEPHONE ENCOUNTER
Patient calls reporting taking doxycyline due to ongoing skin infections. Reports history of yeast infections because of taking antibiotic.     Patient states she's having signs of yeast infection again (itchiness and foul odor) and requesting refill of fluconazole. States Dr. Low refills this for her because of this reason. Also needing refill on amlodipine, verified dosing.     Last OV was 8/22/18 with Dr. Low, but notes not complete.    Requested Prescriptions   Pending Prescriptions Disp Refills     fluconazole (DIFLUCAN) 150 MG tablet  Last Written Prescription Date:  Patient Reported (12/18/17 by Dr. Low, but then discontinued)  Last Fill Quantity: 2,  # refills: 0   Last office visit: 8/22/18  Future Office Visit:     1 tablet      Sig: Take 1 tablet (150 mg) by mouth    There is no refill protocol information for this order        amLODIPine (NORVASC) 10 MG tablet  Last Written Prescription Date:  Patient Reported (12/18/17 by Dr. Low, then discontinued)  Last Fill Quantity: 30,  # refills: 1   Last office visit: 8/22/18  Future Office Visit:     30 tablet      Sig: Take 1 tablet (10 mg) by mouth    There is no refill protocol information for this order        Routing refill request to provider for review/approval because:  Medication is reported/historical    This request is forwarded to Dr Miranda who covers Dr Low patients with the letters O, P, Q, R

## 2018-09-27 ENCOUNTER — TELEPHONE (OUTPATIENT)
Dept: INTERNAL MEDICINE | Facility: CLINIC | Age: 48
End: 2018-09-27

## 2018-09-27 NOTE — TELEPHONE ENCOUNTER
Prior Authorization Retail Medication Request    Medication/Dose: Fluconazole  ICD code (if different than what is on RX):  Yeast infection of the vagina [B37.3]  Previously Tried and Failed:  unknown  Rationale:  unknown    Insurance Name:  Plan Telephone   Insurance ID:  [id number]      Pharmacy Information (if different than what is on RX)  Name:  [facility]  Phone:  [phone]

## 2018-09-28 NOTE — TELEPHONE ENCOUNTER
PA not needed.  Pharmacy was trying to bill secondary insurance.  Spoke with pharmacist she stated medication processed through primary insurance.

## 2018-09-28 NOTE — TELEPHONE ENCOUNTER
Central Prior Authorization Team   Phone: [phone]    PA Initiation    Medication: Amlodipine  Insurance Company: [employer] - Phone [phone] Fax [phone]  Pharmacy Filling the Rx: [facility] [id number] [place] [address] AVE AT [address] [place] [address]  Filling Pharmacy Phone: [phone]  Filling Pharmacy Fax: [phone]  Start Date: 9/28/2018

## 2018-11-04 ENCOUNTER — APPOINTMENT (OUTPATIENT)
Dept: GENERAL RADIOLOGY | Facility: CLINIC | Age: 48
End: 2018-11-04
Attending: EMERGENCY MEDICINE
Payer: MEDICARE

## 2018-11-04 ENCOUNTER — HOSPITAL ENCOUNTER (EMERGENCY)
Facility: CLINIC | Age: 48
Discharge: HOME | End: 2018-11-04
Attending: EMERGENCY MEDICINE | Admitting: EMERGENCY MEDICINE
Payer: MEDICARE

## 2018-11-04 VITALS
DIASTOLIC BLOOD PRESSURE: 131 MMHG | OXYGEN SATURATION: 100 % | RESPIRATION RATE: 26 BRPM | HEART RATE: 71 BPM | TEMPERATURE: 98.8 F | SYSTOLIC BLOOD PRESSURE: 190 MMHG

## 2018-11-04 DIAGNOSIS — J06.9 UPPER RESPIRATORY TRACT INFECTION, UNSPECIFIED TYPE: ICD-10-CM

## 2018-11-04 DIAGNOSIS — J44.1 COPD EXACERBATION (H): ICD-10-CM

## 2018-11-04 DIAGNOSIS — R07.9 ACUTE CHEST PAIN: ICD-10-CM

## 2018-11-04 LAB
ANION GAP SERPL CALCULATED.3IONS-SCNC: 7 MMOL/L (ref 3–14)
BASOPHILS # BLD AUTO: 0.1 10E9/L (ref 0–0.2)
BASOPHILS NFR BLD AUTO: 0.3 %
BUN SERPL-MCNC: 26 MG/DL (ref 7–30)
CALCIUM SERPL-MCNC: 8.7 MG/DL (ref 8.5–10.1)
CHLORIDE SERPL-SCNC: 110 MMOL/L (ref 94–109)
CO2 BLDCOV-SCNC: 23 MMOL/L (ref 21–28)
CO2 SERPL-SCNC: 24 MMOL/L (ref 20–32)
CREAT SERPL-MCNC: 0.93 MG/DL (ref 0.52–1.04)
DIFFERENTIAL METHOD BLD: ABNORMAL
EOSINOPHIL # BLD AUTO: 0 10E9/L (ref 0–0.7)
EOSINOPHIL NFR BLD AUTO: 0 %
ERYTHROCYTE [DISTWIDTH] IN BLOOD BY AUTOMATED COUNT: 13.2 % (ref 10–15)
GFR SERPL CREATININE-BSD FRML MDRD: 64 ML/MIN/1.7M2
GLUCOSE SERPL-MCNC: 83 MG/DL (ref 70–99)
HCG SERPL QL: NEGATIVE
HCT VFR BLD AUTO: 45.2 % (ref 35–47)
HGB BLD-MCNC: 14.7 G/DL (ref 11.7–15.7)
IMM GRANULOCYTES # BLD: 0.1 10E9/L (ref 0–0.4)
IMM GRANULOCYTES NFR BLD: 0.7 %
LACTATE BLD-SCNC: 1.9 MMOL/L (ref 0.7–2.1)
LYMPHOCYTES # BLD AUTO: 4.2 10E9/L (ref 0.8–5.3)
LYMPHOCYTES NFR BLD AUTO: 27.5 %
MCH RBC QN AUTO: 30 PG (ref 26.5–33)
MCHC RBC AUTO-ENTMCNC: 32.5 G/DL (ref 31.5–36.5)
MCV RBC AUTO: 92 FL (ref 78–100)
MONOCYTES # BLD AUTO: 0.9 10E9/L (ref 0–1.3)
MONOCYTES NFR BLD AUTO: 5.9 %
NEUTROPHILS # BLD AUTO: 10.1 10E9/L (ref 1.6–8.3)
NEUTROPHILS NFR BLD AUTO: 65.6 %
NRBC # BLD AUTO: 0 10*3/UL
NRBC BLD AUTO-RTO: 0 /100
NT-PROBNP SERPL-MCNC: 19 PG/ML (ref 0–450)
PCO2 BLDV: 44 MM HG (ref 40–50)
PH BLDV: 7.33 PH (ref 7.32–7.43)
PLATELET # BLD AUTO: 404 10E9/L (ref 150–450)
PO2 BLDV: 33 MM HG (ref 25–47)
POTASSIUM SERPL-SCNC: 3.8 MMOL/L (ref 3.4–5.3)
RBC # BLD AUTO: 4.9 10E12/L (ref 3.8–5.2)
SAO2 % BLDV FROM PO2: 59 %
SODIUM SERPL-SCNC: 141 MMOL/L (ref 133–144)
TROPONIN I SERPL-MCNC: <0.015 UG/L (ref 0–0.04)
WBC # BLD AUTO: 15.4 10E9/L (ref 4–11)

## 2018-11-04 PROCEDURE — 94640 AIRWAY INHALATION TREATMENT: CPT

## 2018-11-04 PROCEDURE — 82803 BLOOD GASES ANY COMBINATION: CPT

## 2018-11-04 PROCEDURE — A9270 NON-COVERED ITEM OR SERVICE: HCPCS | Mod: GY | Performed by: EMERGENCY MEDICINE

## 2018-11-04 PROCEDURE — 25000125 ZZHC RX 250

## 2018-11-04 PROCEDURE — 84703 CHORIONIC GONADOTROPIN ASSAY: CPT | Performed by: EMERGENCY MEDICINE

## 2018-11-04 PROCEDURE — 93005 ELECTROCARDIOGRAM TRACING: CPT

## 2018-11-04 PROCEDURE — 71046 X-RAY EXAM CHEST 2 VIEWS: CPT

## 2018-11-04 PROCEDURE — 85025 COMPLETE CBC W/AUTO DIFF WBC: CPT | Performed by: EMERGENCY MEDICINE

## 2018-11-04 PROCEDURE — 84484 ASSAY OF TROPONIN QUANT: CPT | Performed by: EMERGENCY MEDICINE

## 2018-11-04 PROCEDURE — 99285 EMERGENCY DEPT VISIT HI MDM: CPT | Mod: 25

## 2018-11-04 PROCEDURE — 83605 ASSAY OF LACTIC ACID: CPT

## 2018-11-04 PROCEDURE — 83880 ASSAY OF NATRIURETIC PEPTIDE: CPT | Performed by: EMERGENCY MEDICINE

## 2018-11-04 PROCEDURE — 25000131 ZZH RX MED GY IP 250 OP 636 PS 637: Mod: GY | Performed by: EMERGENCY MEDICINE

## 2018-11-04 PROCEDURE — 25000125 ZZHC RX 250: Performed by: EMERGENCY MEDICINE

## 2018-11-04 PROCEDURE — 25000132 ZZH RX MED GY IP 250 OP 250 PS 637: Mod: GY | Performed by: EMERGENCY MEDICINE

## 2018-11-04 PROCEDURE — 80048 BASIC METABOLIC PNL TOTAL CA: CPT | Performed by: EMERGENCY MEDICINE

## 2018-11-04 RX ORDER — AZITHROMYCIN 250 MG/1
TABLET, FILM COATED ORAL
Qty: 6 TABLET | Refills: 0 | Status: SHIPPED | OUTPATIENT
Start: 2018-11-04 | End: 2018-11-09

## 2018-11-04 RX ORDER — ONDANSETRON 4 MG/1
4 TABLET, ORALLY DISINTEGRATING ORAL ONCE
Status: COMPLETED | OUTPATIENT
Start: 2018-11-04 | End: 2018-11-04

## 2018-11-04 RX ORDER — IPRATROPIUM BROMIDE AND ALBUTEROL SULFATE 2.5; .5 MG/3ML; MG/3ML
SOLUTION RESPIRATORY (INHALATION)
Status: COMPLETED
Start: 2018-11-04 | End: 2018-11-04

## 2018-11-04 RX ORDER — PREDNISONE 20 MG/1
TABLET ORAL
Qty: 10 TABLET | Refills: 0 | Status: SHIPPED | OUTPATIENT
Start: 2018-11-04 | End: 2018-11-21

## 2018-11-04 RX ORDER — OXYCODONE AND ACETAMINOPHEN 5; 325 MG/1; MG/1
1-2 TABLET ORAL EVERY 4 HOURS PRN
Qty: 6 TABLET | Refills: 0 | Status: SHIPPED | OUTPATIENT
Start: 2018-11-04 | End: 2018-11-20

## 2018-11-04 RX ORDER — PREDNISONE 20 MG/1
60 TABLET ORAL ONCE
Status: COMPLETED | OUTPATIENT
Start: 2018-11-04 | End: 2018-11-04

## 2018-11-04 RX ORDER — OXYCODONE HYDROCHLORIDE 5 MG/1
10 TABLET ORAL ONCE
Status: COMPLETED | OUTPATIENT
Start: 2018-11-04 | End: 2018-11-04

## 2018-11-04 RX ORDER — DIPHENHYDRAMINE HCL 25 MG
25 CAPSULE ORAL ONCE
Status: COMPLETED | OUTPATIENT
Start: 2018-11-04 | End: 2018-11-04

## 2018-11-04 RX ORDER — ALBUTEROL SULFATE 0.83 MG/ML
5 SOLUTION RESPIRATORY (INHALATION) ONCE
Status: COMPLETED | OUTPATIENT
Start: 2018-11-04 | End: 2018-11-04

## 2018-11-04 RX ORDER — DIPHENHYDRAMINE HCL 25 MG
25-50 TABLET ORAL EVERY 6 HOURS PRN
Qty: 60 TABLET | Refills: 1 | Status: SHIPPED | OUTPATIENT
Start: 2018-11-04 | End: 2018-11-20

## 2018-11-04 RX ORDER — IPRATROPIUM BROMIDE AND ALBUTEROL SULFATE 2.5; .5 MG/3ML; MG/3ML
6 SOLUTION RESPIRATORY (INHALATION) ONCE
Status: DISCONTINUED | OUTPATIENT
Start: 2018-11-04 | End: 2018-11-04

## 2018-11-04 RX ORDER — ONDANSETRON 4 MG/1
4 TABLET, ORALLY DISINTEGRATING ORAL EVERY 8 HOURS PRN
Qty: 10 TABLET | Refills: 0 | Status: SHIPPED | OUTPATIENT
Start: 2018-11-04 | End: 2018-11-07

## 2018-11-04 RX ADMIN — PREDNISONE 60 MG: 20 TABLET ORAL at 12:55

## 2018-11-04 RX ADMIN — IPRATROPIUM BROMIDE AND ALBUTEROL SULFATE 6 ML: .5; 3 SOLUTION RESPIRATORY (INHALATION) at 12:00

## 2018-11-04 RX ADMIN — OXYCODONE HYDROCHLORIDE 10 MG: 5 TABLET ORAL at 12:54

## 2018-11-04 RX ADMIN — DIPHENHYDRAMINE HYDROCHLORIDE 25 MG: 25 CAPSULE ORAL at 12:54

## 2018-11-04 RX ADMIN — ALBUTEROL SULFATE 5 MG: 2.5 SOLUTION RESPIRATORY (INHALATION) at 12:56

## 2018-11-04 RX ADMIN — ONDANSETRON 4 MG: 4 TABLET, ORALLY DISINTEGRATING ORAL at 12:54

## 2018-11-04 ASSESSMENT — ENCOUNTER SYMPTOMS
DIARRHEA: 1
CHEST TIGHTNESS: 1
SHORTNESS OF BREATH: 1
FEVER: 0
WHEEZING: 1
DIAPHORESIS: 1
VOMITING: 0
CHILLS: 1
COUGH: 1

## 2018-11-04 NOTE — ED AVS SNAPSHOT
[facility] Emergency Department    [address]    [facility] [address]    Phone:  [phone]    Fax:  [phone]                                       Swetha Patterson   MRN: [id number]    Department:  [facility] Emergency Department   Date of Visit:  11/4/2018           After Visit Summary Signature Page     I have received my discharge instructions, and my questions have been answered. I have discussed any challenges I see with this plan with the nurse or doctor.    ..........................................................................................................................................  Patient/Patient Representative Signature      ..........................................................................................................................................  Patient Representative Print Name and Relationship to Patient    ..................................................               ................................................  Date                                   Time    ..........................................................................................................................................  Reviewed by Signature/Title    ...................................................              ..............................................  Date                                               Time          22EPIC Rev 08/18

## 2018-11-04 NOTE — ED PROVIDER NOTES
History     Chief Complaint:  Cough and Shortness of Breath      HPI   Swetha Patterson is a 48 year old female with a history of asthma and COPD who presents to the emergency department with her  for evaluation of cough and shortness of breath. The patient reports that for the last two days she has had pleuritic chest tightness with a productive cough, shortness of breath, wheezing, diarrhea, dizziness, diaphoresis, and chills. The patient denies exposure to homeless population or anyone else who is ill. She does not have fever, production of blood when she coughs, or vomiting, leg swelling, history of blood clot.    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  DIAZ  Opioid dependence  COPD  Suicide attempt   Intentional drug overdose  Vocal cord polyp    COPD  Depressive disorder  Emphysema with chronic bronchitis  GERD  Hypertension  Hyperlipidemia  Liver disease  Pneumonia  Polyps    Past Surgical History:    Arthroscopy shoulder decompression  Bronchial thermoplasty  Appendectomy  Discectomy/spinal fusion  Excise node mediastinal   Laparoscopic cholecystectomy  Thoracoscopy  Tonsillectomy    Family History:    Heart disease: Mother, maternal grandfather  Hypertension  Cerebrovascular disease  Depression  Gallbladder disease  Asthma  Diabetes    Social History:  Light tobacco smoker  Negative for alcohol use.  Marital Status:        Review of Systems   Constitutional: Positive for chills and diaphoresis. Negative for fever.    Respiratory: Positive for cough, chest tightness, shortness of breath and wheezing.    Gastrointestinal: Positive for diarrhea. Negative for vomiting.   All other systems reviewed and are negative.    Physical Exam     Patient Vitals for the past 24 hrs:   BP Temp Temp src Pulse Resp SpO2   11/04/18 1245 - - - - - 98 %   11/04/18 1143 (!) 190/131 98.8  F (37.1  C) Temporal 71 26 96 %       Physical Exam  VS: Reviewed per above  HENT: Mucous membranes moist  EYES: sclera anicteric  CV: Rate as noted, regular rhythm.   RESP: Effort normal.  Expiratory wheezing bilaterally  GI: no tenderness, not distended.  NEURO: Alert, moving all extremities  MSK: No deformity of the extremities.  No lower extremity edema or asymmetry.  SKIN: Warm and dry    Emergency Department Course   ECG:  @ 1211  Indication: Shortness of breath  Vent. Rate 83 bpm. TN interval 108 ms. QRS duration 82 ms. QT/QTc 390/458 ms. P-R-T axis 21 56 55.   Sinus rhythm with short TN. Nonspecific ST abnormality. Abnormal ECG.  No significant change when compared to previous ECG from 2/8/18   Read @ 1440 Dr. Chatterjee.    Imaging:  Chest X-Ray. 2 Views:   IMPRESSION: Previous cervical spinal fusion. Otherwise normal. No  change.  Reading per radiology.     Radiographic findings were communicated with the patient who voiced understanding of the findings.    Laboratory:  CBC: WBC: 15.4 (H), HGB: 14.7, PLT: 404  BMP: Chloride: 110 (H), o/w WNL (Creatinine: 0.93)    HCG qualitative pregnancy blood: Negative    BNP: 19    1208 Troponin I: <0.015    ISTAT gases lactate venous: pH: 7.33 / PCO2: 44 / PO2: 33 / Bicarb: 23 / O2 Sat: 59 / Lactic acid: 1.9    Interventions:  1200 Albuterol-Ipratropium inhalation solution, 2.5 mg-0.5 mg/3 ml; 6 mL, inhalation  1254 Roxicodone 10 mg tablet PO  1254 Benadryl 25 mg capsule PO  1254 Zofran ODT 4 mg  1255 Deltasone 60 mg tablet PO  1256 Albuterol 5 mg nebulization    Emergency Department Course:  1218 Nursing notes and  vitals reviewed. I performed an exam of the patient as documented above.     Medicine administered as documented above. Blood drawn. This was sent to the lab for further testing, results above.    The patient was sent for a chest XR while in the emergency department, findings above.     1412 I rechecked the patient and discussed the results of her workup thus far.     Findings and plan explained to the Patient. Patient discharged home with instructions regarding supportive care, medications, and reasons to return. The importance of close follow-up was reviewed. The patient was prescribed Zithromax, Benadryl, Zofran, Percocet and Deltasone.    I personally reviewed the laboratory results with the Patient and answered all related questions prior to discharge.     Impression & Plan    Medical Decision Making:  Patient presents to the ER with cough, wheezing, chest discomfort.  Initial vital signs notable for blood pressure 190/130.  EKG did not show signs of ischemia and a single troponin is negative.  Furthermore history is not consistent with ACS.  It seems that her chest pain is limited to coughing.  Clinically she has bronchospasm in the setting of respiratory infection-viral versus bacterial.  There is no evidence of pneumonia on chest x-ray.  With the clinical scenario and wheezing, PE was also felt less likely.  Given history of COPD she was given DuoNeb and albuterol nebs and prednisone for presumed exacerbation.  She did have some improvement with this.  She will also be discharged with a Z-Chapito given COPD exacerbation.  Close return precautions were discussed.  Plan for follow-up in clinic.    Diagnosis:    ICD-10-CM    1. COPD exacerbation (H) J44.1    2. Upper respiratory tract infection, unspecified type J06.9    3. Acute chest pain R07.9        Disposition:  discharged to home    Discharge Medications:  New Prescriptions    AZITHROMYCIN (ZITHROMAX Z-CHAPITO) 250 MG TABLET    Two tablets on the first day, then  one tablet daily for the next 4 days    DIPHENHYDRAMINE (BENADRYL) 25 MG TABLET    Take 1-2 tablets (25-50 mg) by mouth every 6 hours as needed for itching or allergies    ONDANSETRON (ZOFRAN ODT) 4 MG ODT TAB    Take 1 tablet (4 mg) by mouth every 8 hours as needed    OXYCODONE-ACETAMINOPHEN (PERCOCET) 5-325 MG PER TABLET    Take 1-2 tablets by mouth every 4 hours as needed for pain    PREDNISONE (DELTASONE) 20 MG TABLET    Take two tablets (= 40mg) each day for 5 (five) days     Scribe Disclosure:  I, Lazarus Andino, am serving as a scribe on 11/4/2018 at 12:18 PM to personally document services performed by Dr. Sen MD based on my observations and the provider's statements to me.       Lazarus Andino  11/4/2018   [facility] EMERGENCY DEPARTMENT       Preston Chatterjee MD  11/04/18 1535

## 2018-11-04 NOTE — ED TRIAGE NOTES
Patient comes in for evaluation of cough and shortness of breath which has been going on for about 2 days. Patient states she has been using nebs at home and it helps a little for a while but doesn't last long. ABCs intact.

## 2018-11-04 NOTE — ED AVS SNAPSHOT
[facility]    [address]    [facility] [address]    Phone:  [phone]    Fax:  [phone]                                       Swetha Patterson   MRN: [id number]    Department:  [facility] [place] [facility]   Date of Visit:  11/4/2018           Patient Information     Date Of Birth          1970        Your diagnoses for this visit were:     COPD exacerbation (H)     Upper respiratory tract infection, unspecified type     Acute chest pain        You were seen by Preston Chatterjee MD.      Follow-up Information     Follow up with [facility].    Specialty:  EMERGENCY MEDICINE    Why:  As needed, If symptoms worsen    Contact information:    [address]  [facility] [address] [phone]        Follow up with Roro Chawla MD. Schedule an appointment as soon as possible for a visit in 3 days.    Specialty:  Internal Medicine    Why:  recheck    Contact information:    [address] [facility] [address]  [phone]          Discharge Instructions       Discharge Instructions  Bronchitis, Pneumonia, Bronchospasm    You were seen today for a chest infection or inflammation. If your provider decided this was due to a bacterial infection, you may need an antibiotic. Sometimes these are caused by a virus, and then an antibiotic will not help.     Generally, every Emergency Department visit should have a follow-up clinic visit with either a primary or a specialty clinic/provider. Please follow-up as instructed by your emergency provider today.    Return to the Emergency Department if:    Your breathing gets much worse.    You are very weak, or feel much more ill.    You develop new symptoms, such as chest pain.    You cough up blood.    You are vomiting (throwing up) enough that you cannot keep fluids or your medicine down.    What can I do to help myself?    Fill any  prescriptions the provider gave you and take them right away--especially antibiotics. Be sure to finish the whole antibiotic prescription.    You may be given a prescription for an inhaler, which can help loosen tight air passages.  Use this as needed, but not more often than directed. Inhalers work much better when used with a spacer.     You may be given a prescription for a steroid to reduce inflammation. Used long-term, these can have side effects, but for short-term use they are safe. You may notice restlessness or increased appetite.        You may use non-prescription cough or cold medicines. Cough medicines may help, but don t make the cough go away completely.     Avoid smoke, because this can make your symptoms worse. If you smoke, this may be a good time to quit! Consider using nicotine lozenges, gum, or patches to reduce cravings.     If you have a fever, Tylenol  (acetaminophen), Motrin  (ibuprofen), or Advil  (ibuprofen) may help bring fever down and may help you feel more comfortable. Be sure to read and follow the package directions, and ask your provider if you have questions.    Be sure to get your flu shot each year.  For certain ages, the pneumonia shot can help prevent pneumonia.  If you were given a prescription for medicine here today, be sure to read all of the information (including the package insert) that comes with your prescription.  This will include important information about the medicine, its side effects, and any warnings that you need to know about.  The pharmacist who fills the prescription can provide more information and answer questions you may have about the medicine.  If you have questions or concerns that the pharmacist cannot address, please call or return to the Emergency Department.     Remember that you can always come back to the Emergency Department if you are not able to see your regular provider in the amount of time listed above, if you get any new symptoms, or if  there is anything that worries you.      24 Hour Appointment Hotline       To make an appointment at any [facility], call [phone] ([phone]). If you don't have a family doctor or clinic, we will help you find one. [facility] are conveniently located to serve the needs of you and your family.             Review of your medicines      START taking        Dose / Directions Last dose taken    azithromycin 250 MG tablet   Commonly known as:  ZITHROMAX Z-CHAPITO   Quantity:  6 tablet        Two tablets on the first day, then one tablet daily for the next 4 days   Refills:  0        diphenhydrAMINE 25 MG tablet   Commonly known as:  BENADRYL   Dose:  25-50 mg   Quantity:  60 tablet        Take 1-2 tablets (25-50 mg) by mouth every 6 hours as needed for itching or allergies   Refills:  1        ondansetron 4 MG ODT tab   Commonly known as:  ZOFRAN ODT   Dose:  4 mg   Quantity:  10 tablet        Take 1 tablet (4 mg) by mouth every 8 hours as needed   Refills:  0        oxyCODONE-acetaminophen 5-325 MG per tablet   Commonly known as:  PERCOCET   Dose:  1-2 tablet   Quantity:  6 tablet        Take 1-2 tablets by mouth every 4 hours as needed for pain   Refills:  0        predniSONE 20 MG tablet   Commonly known as:  DELTASONE   Quantity:  10 tablet        Take two tablets (= 40mg) each day for 5 (five) days   Refills:  0          Our records show that you are taking the medicines listed below. If these are incorrect, please call your family doctor or clinic.        Dose / Directions Last dose taken    acetaminophen 325 MG tablet   Commonly known as:  TYLENOL   Dose:  650 mg   Quantity:  50 tablet        Take 2 tablets (650 mg) by mouth every 6 hours as needed for other (mild pain)   Refills:  0        ADVAIR DISKUS 250-50 MCG/DOSE diskus inhaler   Dose:  1 puff   Generic drug:  fluticasone-salmeterol        Inhale 1 puff into the lungs   Refills:  0        * albuterol (2.5 MG/3ML) 0.083% neb solution    Dose:  2.5 mg   Quantity:  25 vial        Take 1 vial (2.5 mg) by nebulization every 6 hours as needed for shortness of breath / dyspnea or wheezing   Refills:  3        * albuterol 108 (90 Base) MCG/ACT inhaler   Commonly known as:  VENTOLIN HFA   Dose:  2 puff   Quantity:  3 Inhaler        Inhale 2 puffs into the lungs every 6 hours   Refills:  3        amLODIPine 10 MG tablet   Commonly known as:  NORVASC   Dose:  10 mg   Quantity:  90 tablet        Take 1 tablet (10 mg) by mouth daily   Refills:  1        atorvastatin 80 MG tablet   Commonly known as:  LIPITOR   Dose:  80 mg   Quantity:  30 tablet        Take 1 tablet (80 mg) by mouth daily   Refills:  0        benzoyl peroxide 10 % topical gel        Refills:  0        budesonide-formoterol 160-4.5 MCG/ACT Inhaler   Commonly known as:  SYMBICORT   Dose:  2 puff   Quantity:  3 Inhaler        Inhale 2 puffs into the lungs 2 times daily   Refills:  1        cetirizine HCl 10 MG Caps   Dose:  10 mg        Take 10 mg by mouth   Refills:  0        chlorhexidine 4 % liquid   Commonly known as:  HIBICLENS        Wash ab, groin, thighs daily in shower DO NOT PUT ON FACE   Refills:  0        cyclobenzaprine 10 MG tablet   Commonly known as:  FLEXERIL        TK 1 T TID PRN   Refills:  0        desonide 0.05 % cream   Commonly known as:  DESOWEN        Refills:  0        diazepam 10 MG tablet   Commonly known as:  VALIUM   Dose:  10 mg        Take 10 mg by mouth   Refills:  0        doxepin 10 MG capsule   Commonly known as:  SINEquan   Dose:  10 mg        Take 10 mg by mouth   Refills:  0        doxycycline 100 MG tablet   Commonly known as:  VIBRA-TABS   Dose:  100 mg   Quantity:  60 tablet        Take 1 tablet (100 mg) by mouth 2 times daily   Refills:  0        estradiol 0.1 MG/GM cream   Commonly known as:  ESTRACE   Dose:  1 g        Place 1 g vaginally   Refills:  0        fluconazole 150 MG tablet   Commonly known as:  DIFLUCAN   Dose:  150 mg   Quantity:  4  tablet        Take 1 tablet (150 mg) by mouth every 3 days   Refills:  0        fluticasone 50 MCG/ACT spray   Commonly known as:  FLONASE   Dose:  2 spray        Spray 2 sprays in nostril   Refills:  0        furosemide 20 MG tablet   Commonly known as:  LASIX   Dose:  20 mg   Quantity:  30 tablet        Take 1 tablet (20 mg) by mouth daily as needed   Refills:  0        hydrOXYzine 50 MG tablet   Commonly known as:  ATARAX   Dose:   mg   Quantity:  180 tablet        Take 1-2 tablets ( mg) by mouth 4 times daily as needed for anxiety Increased dose. For anxiety   Refills:  0        ipratropium - albuterol 0.5 mg/2.5 mg/3 mL 0.5-2.5 (3) MG/3ML neb solution   Commonly known as:  DUONEB   Dose:  1 vial   Quantity:  360 mL        Take 1 vial (3 mLs) by nebulization every 6 hours   Refills:  0        ketoconazole 2 % shampoo   Commonly known as:  NIZORAL        Refills:  0        lisinopril 20 MG tablet   Commonly known as:  PRINIVIL/ZESTRIL   Dose:  20 mg   Quantity:  90 tablet        Take 1 tablet (20 mg) by mouth daily   Refills:  1        methocarbamol 750 MG tablet   Commonly known as:  ROBAXIN   Dose:  750 mg   Quantity:  30 tablet        Take 1 tablet (750 mg) by mouth 3 times daily as needed for muscle spasms   Refills:  0        montelukast 10 MG tablet   Commonly known as:  SINGULAIR   Dose:  10 mg   Quantity:  30 tablet        Take 1 tablet (10 mg) by mouth At Bedtime   Refills:  0        nicotine polacrilex 2 MG gum   Commonly known as:  NICORETTE   Dose:  2 mg   Quantity:  100 tablet        Place 1 each (2 mg) inside cheek as needed for smoking cessation   Refills:  3        omeprazole 20 MG CR capsule   Commonly known as:  priLOSEC   Dose:  20 mg   Quantity:  180 capsule        Take 1 capsule (20 mg) by mouth 2 times daily   Refills:  0        ondansetron 8 MG tablet   Commonly known as:  ZOFRAN   Dose:  4-8 mg   Quantity:  15 tablet        Take 0.5-1 tablets (4-8 mg) by mouth every 8 hours as  needed for nausea   Refills:  0        order for DME   Quantity:  1 Device        Equipment being ordered: Nebulizer machine Length of need-lifetime   Refills:  0        order for DME   Quantity:  1 Units        Equipment being ordered: Nebulizer   Refills:  0        ranitidine 75 MG tablet   Commonly known as:  ZANTAC   Dose:  75 mg        Take 75 mg by mouth   Refills:  0        sucralfate 1 GM tablet   Commonly known as:  CARAFATE   Dose:  1 g   Quantity:  40 tablet        Take 1 tablet (1 g) by mouth 4 times daily   Refills:  3        tamsulosin 0.4 MG capsule   Commonly known as:  FLOMAX   Dose:  0.4 mg   Quantity:  14 capsule        Take 1 capsule (0.4 mg) by mouth daily   Refills:  0        tiotropium 18 MCG capsule   Commonly known as:  SPIRIVA HANDIHALER   Quantity:  90 capsule        Inhale contents of one capsule daily.   Refills:  3        * VITAMIN D (CHOLECALCIFEROL) PO   Dose:  2000 Units        Take 2,000 Units by mouth daily   Refills:  0        * vitamin D 2000 units Caps   Dose:  2000 Units   Quantity:  30 capsule        Take 2,000 Units by mouth daily   Refills:  3        * Notice:  This list has 4 medication(s) that are the same as other medications prescribed for you. Read the directions carefully, and ask your doctor or other care provider to review them with you.            Information about OPIOIDS     PRESCRIPTION OPIOIDS: WHAT YOU NEED TO KNOW   We gave you an opioid (narcotic) pain medicine. It is important to manage your pain, but opioids are not always the best choice. You should first try all the other options your care team gave you. Take this medicine for as short a time (and as few doses) as possible.    Some activities can increase your pain, such as bandage changes or therapy sessions. It may help to take your pain medicine 30 to 60 minutes before these activities. Reduce your stress by getting enough sleep, working on hobbies you enjoy and practicing relaxation or meditation.  Talk to your care team about ways to manage your pain beyond prescription opioids.    These medicines have risks:    DO NOT drive when on new or higher doses of pain medicine. These medicines can affect your alertness and reaction times, and you could be arrested for driving under the influence (DUI). If you need to use opioids long-term, talk to your care team about driving.    DO NOT operate heavy machinery    DO NOT do any other dangerous activities while taking these medicines.    DO NOT drink any alcohol while taking these medicines.     If the opioid prescribed includes acetaminophen, DO NOT take with any other medicines that contain acetaminophen. Read all labels carefully. Look for the word  acetaminophen  or  Tylenol.  Ask your pharmacist if you have questions or are unsure.    You can get addicted to pain medicines, especially if you have a history of addiction (chemical, alcohol or substance dependence). Talk to your care team about ways to reduce this risk.    All opioids tend to cause constipation. Drink plenty of water and eat foods that have a lot of fiber, such as fruits, vegetables, prune juice, apple juice and high-fiber cereal. Take a laxative (Miralax, milk of magnesia, Colace, Senna) if you don t move your bowels at least every other day. Other side effects include upset stomach, sleepiness, dizziness, throwing up, tolerance (needing more of the medicine to have the same effect), physical dependence and slowed breathing.    Store your pills in a secure place, locked if possible. We will not replace any lost or stolen medicine. If you don t finish your medicine, please throw away (dispose) as directed by your pharmacist. The [place] [employer] has more information about safe disposal: [email or web address]-medications        Prescriptions were sent or printed at these locations (5 Prescriptions)                   Other Prescriptions                 Printed at Department/Unit printer (5 of 5)         azithromycin (ZITHROMAX Z-CHAPITO) 250 MG tablet               diphenhydrAMINE (BENADRYL) 25 MG tablet               ondansetron (ZOFRAN ODT) 4 MG ODT tab               oxyCODONE-acetaminophen (PERCOCET) 5-325 MG per tablet               predniSONE (DELTASONE) 20 MG tablet                Procedures and tests performed during your visit     Basic metabolic panel    CBC with platelets differential    EKG 12-lead, tracing only    HCG qualitative    ISTAT gases lactate maksim POCT    Nt probnp inpatient (BNP)    Troponin I    XR Chest 2 Views      Orders Needing Specimen Collection     None      Pending Results     Date and Time Order Name Status Description    11/4/2018 1211 EKG 12-lead, tracing only Preliminary             Pending Culture Results     No orders found from 11/2/2018 to 11/5/2018.            Pending Results Instructions     If you had any lab results that were not finalized at the time of your Discharge, you can call the ED Lab Result RN at [phone]. You will be contacted by this team for any positive Lab results or changes in treatment. The nurses are available 7 days a week from 10A to 6:30P.  You can leave a message 24 hours per day and they will return your call.        Test Results From Your Hospital Stay        11/4/2018 12:22 PM      Component Results     Component Value Ref Range & Units Status    WBC 15.4 (H) 4.0 - 11.0 10e9/L Final    RBC Count 4.90 3.8 - 5.2 10e12/L Final    Hemoglobin 14.7 11.7 - 15.7 g/dL Final    Hematocrit 45.2 35.0 - 47.0 % Final    MCV 92 78 - 100 fl Final    MCH 30.0 26.5 - 33.0 pg Final    MCHC 32.5 31.5 - 36.5 g/dL Final    RDW 13.2 10.0 - 15.0 % Final    Platelet Count 404 150 - 450 10e9/L Final    Diff Method Automated Method  Final    % Neutrophils 65.6 % Final    % Lymphocytes 27.5 % Final    % Monocytes 5.9 % Final    % Eosinophils 0.0 % Final    % Basophils 0.3 % Final    % Immature Granulocytes 0.7 % Final     Nucleated RBCs 0 0 /100 Final    Absolute Neutrophil 10.1 (H) 1.6 - 8.3 10e9/L Final    Absolute Lymphocytes 4.2 0.8 - 5.3 10e9/L Final    Absolute Monocytes 0.9 0.0 - 1.3 10e9/L Final    Absolute Eosinophils 0.0 0.0 - 0.7 10e9/L Final    Absolute Basophils 0.1 0.0 - 0.2 10e9/L Final    Abs Immature Granulocytes 0.1 0 - 0.4 10e9/L Final    Absolute Nucleated RBC 0.0  Final         11/4/2018 12:41 PM      Component Results     Component Value Ref Range & Units Status    Sodium 141 133 - 144 mmol/L Final    Potassium 3.8 3.4 - 5.3 mmol/L Final    Chloride 110 (H) 94 - 109 mmol/L Final    Carbon Dioxide 24 20 - 32 mmol/L Final    Anion Gap 7 3 - 14 mmol/L Final    Glucose 83 70 - 99 mg/dL Final    Urea Nitrogen 26 7 - 30 mg/dL Final    Creatinine 0.93 0.52 - 1.04 mg/dL Final    GFR Estimate 64 >60 mL/min/1.7m2 Final    Non  GFR Calc    GFR Estimate If Black 77 >60 mL/min/1.7m2 Final    African American GFR Calc    Calcium 8.7 8.5 - 10.1 mg/dL Final         11/4/2018 12:41 PM      Component Results     Component Value Ref Range & Units Status    Troponin I ES <0.015 0.000 - 0.045 ug/L Final    The 99th percentile for upper reference range is 0.045 ug/L.  Troponin values   in the range of 0.045 - 0.120 ug/L may be associated with risks of adverse   clinical events.           11/4/2018  2:19 PM      Narrative     CHEST TWO VIEWS  11/4/2018 1:40 PM     HISTORY: Shortness of breath.    COMPARISON: 7/30/2018        Impression     IMPRESSION: Previous cervical spinal fusion. Otherwise normal. No  change.    WEST MCCRARY MD         11/4/2018 12:41 PM      Component Results     Component Value Ref Range & Units Status    N-Terminal Pro BNP Inpatient 19 0 - 450 pg/mL Final       Reference range shown and results flagged as abnormal are suggested inpatient   cut points for confirming diagnosis if CHF in an acute setting. Establishing a   baseline value for each individual patient is useful for follow-up. An    inpatient or emergency department NT-proPBNP <300 pg/mL effectively rules out   acute CHF, with 99% negative predictive value.  The outpatient non-acute reference range for ruling out CHF is:   0-125 pg/mL (age 18 to less than 75)   0-450 pg/mL (age 75 yrs and older)                 11/4/2018 12:14 PM      Component Results     Component Value Ref Range & Units Status    Ph Venous 7.33 7.32 - 7.43 pH Final    PCO2 Venous 44 40 - 50 mm Hg Final    PO2 Venous 33 25 - 47 mm Hg Final    Bicarbonate Venous 23 21 - 28 mmol/L Final    O2 Sat Venous 59 % Final    Lactic Acid 1.9 0.7 - 2.1 mmol/L Final         11/4/2018 12:51 PM      Component Results     Component Value Ref Range & Units Status    HCG Qualitative Serum Negative NEG^Negative Final    This test is for screening purposes.  Results should be interpreted along with   the clinical picture.  Confirmation testing is available if warranted by   ordering [id number], HCG Quantitative Pregnancy.                  Clinical Quality Measure: Blood Pressure Screening     Your blood pressure was checked while you were in the emergency department today. The last reading we obtained was  BP: (!) 190/131 . Please read the guidelines below about what these numbers mean and what you should do about them.  If your systolic blood pressure (the top number) is less than 120 and your diastolic blood pressure (the bottom number) is less than 80, then your blood pressure is normal. There is nothing more that you need to do about it.  If your systolic blood pressure (the top number) is 120-139 or your diastolic blood pressure (the bottom number) is 80-89, your blood pressure may be higher than it should be. You should have your blood pressure rechecked within a year by a primary care provider.  If your systolic blood pressure (the top number) is 140 or greater or your diastolic blood pressure (the bottom number) is 90 or greater, you may have high blood pressure. High blood pressure is  treatable, but if left untreated over time it can put you at risk for heart attack, stroke, or kidney failure. You should have your blood pressure rechecked by a primary care provider within the next 4 weeks.  If your provider in the emergency department today gave you specific instructions to follow-up with your doctor or provider even sooner than that, you should follow that instruction and not wait for up to 4 weeks for your follow-up visit.        Thank you for choosing [facility]       Thank you for choosing [facility] for your care. Our goal is always to provide you with excellent care. Hearing back from our patients is one way we can continue to improve our services. Please take a few minutes to complete the written survey that you may receive in the mail after you visit with us. Thank you!        Care EveryWhere ID     This is your Care EveryWhere ID. This could be used by other organizations to access your [facility] medical records  [id number]        Equal Access to Services     CHARITY MANDUJANO : Ramon Jin, amanda kendrick, caryl bruce . So [facility] [phone].    ATENCIÓN: Si habla español, tiene a vargas disposición servicios gratuitos de asistencia lingüística. Llame al [phone].    We comply with applicable federal civil rights laws and [place] laws. We do not discriminate on the basis of race, color, national origin, age, disability, sex, sexual orientation, or gender identity.            After Visit Summary       This is your record. Keep this with you and show to your community pharmacist(s) and doctor(s) at your next visit.

## 2018-11-05 LAB — INTERPRETATION ECG - MUSE: NORMAL

## 2018-11-12 ENCOUNTER — TRANSFERRED RECORDS (OUTPATIENT)
Dept: HEALTH INFORMATION MANAGEMENT | Facility: CLINIC | Age: 48
End: 2018-11-12

## 2018-11-21 ENCOUNTER — OFFICE VISIT (OUTPATIENT)
Dept: INTERNAL MEDICINE | Facility: CLINIC | Age: 48
End: 2018-11-21
Payer: MEDICARE

## 2018-11-21 VITALS
OXYGEN SATURATION: 96 % | WEIGHT: 198.8 LBS | BODY MASS INDEX: 34.12 KG/M2 | TEMPERATURE: 97.6 F | DIASTOLIC BLOOD PRESSURE: 78 MMHG | RESPIRATION RATE: 16 BRPM | HEART RATE: 69 BPM | SYSTOLIC BLOOD PRESSURE: 126 MMHG

## 2018-11-21 DIAGNOSIS — R10.84 ABDOMINAL PAIN, GENERALIZED: ICD-10-CM

## 2018-11-21 DIAGNOSIS — E78.5 HYPERLIPIDEMIA LDL GOAL <130: Chronic | ICD-10-CM

## 2018-11-21 DIAGNOSIS — Z23 NEED FOR PROPHYLACTIC VACCINATION AND INOCULATION AGAINST INFLUENZA: ICD-10-CM

## 2018-11-21 DIAGNOSIS — J44.1 CHRONIC OBSTRUCTIVE PULMONARY DISEASE WITH (ACUTE) EXACERBATION (H): ICD-10-CM

## 2018-11-21 DIAGNOSIS — Z01.818 PREOP GENERAL PHYSICAL EXAM: Primary | ICD-10-CM

## 2018-11-21 DIAGNOSIS — I10 ESSENTIAL HYPERTENSION WITH GOAL BLOOD PRESSURE LESS THAN 140/90: Chronic | ICD-10-CM

## 2018-11-21 DIAGNOSIS — R19.5 CHANGE IN CONSISTENCY OF STOOL: ICD-10-CM

## 2018-11-21 PROCEDURE — G0008 ADMIN INFLUENZA VIRUS VAC: HCPCS | Performed by: PHYSICIAN ASSISTANT

## 2018-11-21 PROCEDURE — 90732 PPSV23 VACC 2 YRS+ SUBQ/IM: CPT | Performed by: PHYSICIAN ASSISTANT

## 2018-11-21 PROCEDURE — 90682 RIV4 VACC RECOMBINANT DNA IM: CPT | Performed by: PHYSICIAN ASSISTANT

## 2018-11-21 PROCEDURE — 99215 OFFICE O/P EST HI 40 MIN: CPT | Mod: 25 | Performed by: PHYSICIAN ASSISTANT

## 2018-11-21 PROCEDURE — G0009 ADMIN PNEUMOCOCCAL VACCINE: HCPCS | Performed by: PHYSICIAN ASSISTANT

## 2018-11-21 NOTE — PROGRESS NOTES
[address] [address]  [phone]  Dept: [phone]    PRE-OP EVALUATION:  Today's date: 2018    Swetha Patterson (: 1970) presents for pre-operative evaluation assessment as requested by Dr. Aceves.  She requires evaluation and anesthesia risk assessment prior to undergoing surgery/procedure for treatment of possible chrones .    Fax number for surgical facility: [facility]  Primary Physician: Roro Chawla  Type of Anesthesia Anticipated: General    Patient has a Health Care Directive or Living Will:  NO    Preop Questions 2018   Who is doing your surgery? dr aceves   What are you having done? colonoscopy   Date of Surgery/Procedure:    Facility or Hospital where procedure/surgery will be performed: [facility]   1.  Do you have a history of Heart attack, stroke, stent, coronary bypass surgery, or other heart surgery? No   2.  Do you ever have any pain or discomfort in your chest? No   3.  Do you have a history of  Heart Failure? No   4.   Are you troubled by shortness of breath when:  walking on a level surface, or up a slight hill, or at night? YES - has COPD   5.  Do you currently have a cold, bronchitis or other respiratory infection? No   6.  Do you have a cough, shortness of breath, or wheezing? YES - has COPD   7.  Do you sometimes get pains in the calves of your legs when you walk? No   8. Do you or anyone in your family have previous history of blood clots? No   9.  Do you or does anyone in your family have a serious bleeding problem such as prolonged bleeding following surgeries or cuts? No   10. Have you ever had problems with anemia or been told to take iron pills? No   11. Have you had any abnormal blood loss such as black, tarry or bloody stools, or abnormal vaginal bleeding? No   12. Have you ever had a blood transfusion? No   13. Have you or any of your relatives ever had  problems with anesthesia? No   14. Do you have sleep apnea, excessive snoring or daytime drowsiness? No   15. Do you have any prosthetic heart valves? No   16. Do you have prosthetic joints? No   17. Is there any chance that you may be pregnant? No         HPI:     HPI related to upcoming procedure: colonoscopy needed due to continued abd pain and stool changes       COPD - Patient has a longstanding history of moderate-severe COPD . Patient has been doing well overall noting NO SYMPTOMS and continues on medication regimen consisting of inhalers per med list  without adverse reactions or side effects.                                                                                                         .  HYPERLIPIDEMIA - Patient has a long history of significant Hyperlipidemia requiring medication for treatment with recent poor control. Patient reports no problems or side effects with the medication.                                                                                                                                                       .  HYPERTENSION - Patient has longstanding history of HTN , currently denies any symptoms referable to elevated blood pressure. Specifically denies chest pain, palpitations, dyspnea, orthopnea, PND or peripheral edema. Blood pressure readings have been in normal range. Current medication regimen is as listed below. Patient denies any side effects of medication.                                                                                                                                                                                          .    MEDICAL HISTORY:     Patient Active Problem List    Diagnosis Date Noted     Vocal cord polyp 06/28/2018     Priority: Medium     Chronic obstructive pulmonary disease with (acute) exacerbation (H) 06/28/2018     Priority: Medium     Status post cervical spinal fusion 05/01/2018     Priority: Medium     Suicide attempt (H)  "02/08/2018     Priority: Medium     Intentional drug overdose (H) 02/08/2018     Priority: Medium     Cervical HNP C5-6 11/09/2017     Priority: Medium     Opioid type dependence, continuous (H) 11/09/2017     Priority: Medium     Essential hypertension with goal blood pressure less than 140/90 11/01/2016     Priority: Medium     DIAZ (nonalcoholic steatohepatitis) 06/17/2016     Priority: Medium     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  Doesn't drink, but relapsed (doesn't remember).  Was on Valium for 4 years (until October 2017). Xanax since 11/2017.  Managed by hospital.        Past Medical History:   Diagnosis Date     Anxiety state, unspecified      Asthma      Chronic pain     back pain from cyst     Contact dermatitis and other eczema, due to unspecified cause      COPD (chronic obstructive pulmonary disease) (H)      Depressive disorder, not elsewhere classified      Emphysema with chronic bronchitis (H)      Esophageal reflux      Family history of ischemic heart disease      Gastro-oesophageal reflux disease      History of emphysema      Hoarseness      HTN, goal below 140/90     No cardilogist     Hyperlipidemia LDL goal <130      Liver disease     \"fatty liver\"     Other chronic pain     chest, from coughing     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)      PONV (postoperative nausea and vomiting)      Past Surgical History:   Procedure Laterality Date     ARTHROSCOPY SHOULDER DECOMPRESSION Left 10/21/2015    Procedure: ARTHROSCOPY " SHOULDER DECOMPRESSION;  Surgeon: Julien Milian MD;  Location: [facility] OR     BRONCHIAL THERMOPLASTY N/A 11/14/2014    Procedure: BRONCHIAL THERMOPLASTY;  Surgeon: Ward Whitaker MD;  Location: [facility]     BRONCHIAL THERMOPLASTY N/A 12/19/2014    Procedure: BRONCHIAL THERMOPLASTY;  Surgeon: Ward Whitaker MD;  Location: [facility] OR     BRONCHIAL THERMOPLASTY N/A 2/6/2015    Procedure: BRONCHIAL THERMOPLASTY;  Surgeon: Ward Whitaker MD;  Location: [facility] OR     C APPENDECTOMY  at age 17     DISCECTOMY, FUSION CERVICAL ANTERIOR ONE LEVEL, COMBINED N/A 5/1/2018    Procedure: COMBINED DISCECTOMY, FUSION CERVICAL ANTERIOR ONE LEVEL;  1.  C5-C6 anterior cervical diskectomy and fusion.    2.  C5-C6 application of intervertebral biomechanical device for interbody fusion purposes.    3.  C5-C6 anterior instrumentation using the standard Kristin InViZia 24 mm plate with four associated bone screws, 12 mm in length.  Inferior screws are fixed.  Superior screws are va     EXCISE NODE MEDIASTINAL  4/26/2013    Procedure: EXCISE NODE MEDIASTINAL;;  Surgeon: Av Peña MD;  Location:  OR     LAPAROSCOPIC CHOLECYSTECTOMY N/A 10/19/2017    Procedure: LAPAROSCOPIC CHOLECYSTECTOMY;  LAPAROSCOPIC CHOLECYSTECTOMY;  Surgeon: Ney Jerry MD;  Location:  OR     THORACOSCOPY  4/26/2013    Procedure: THORACOSCOPY;  LEFT VIDEO ASSISTED THORACOSCOPY, RESECTION OF POSTERIOR MEDIASTINAL MASS;  Surgeon: Av Peña MD;  Location:  OR     TONSILLECTOMY  as a kid     TONSILLECTOMY       Current Outpatient Prescriptions   Medication Sig Dispense Refill     acetaminophen (TYLENOL) 325 MG tablet Take 2 tablets (650 mg) by mouth every 6 hours as needed for other (mild pain) 50 tablet 0     albuterol (2.5 MG/3ML) 0.083% neb solution Take 1 vial (2.5 mg) by nebulization every 6 hours as needed for shortness of breath / dyspnea or wheezing 25 vial 3     albuterol (VENTOLIN HFA) 108 (90 Base) MCG/ACT  Inhaler Inhale 2 puffs into the lungs every 6 hours 3 Inhaler 3     amLODIPine (NORVASC) 10 MG tablet Take 1 tablet (10 mg) by mouth daily 90 tablet 1     atorvastatin (LIPITOR) 80 MG tablet Take 1 tablet (80 mg) by mouth daily 30 tablet 0     benzoyl peroxide 10 % topical gel        budesonide-formoterol (SYMBICORT) 160-4.5 MCG/ACT Inhaler Inhale 2 puffs into the lungs 2 times daily 3 Inhaler 1     cetirizine HCl 10 MG CAPS Take 10 mg by mouth       Cholecalciferol (VITAMIN D) 2000 units CAPS Take 2,000 Units by mouth daily 30 capsule 3     desonide (DESOWEN) 0.05 % cream        doxycycline (VIBRA-TABS) 100 MG tablet Take 1 tablet (100 mg) by mouth 2 times daily (Patient taking differently: Take 100 mg by mouth 2 times daily as needed ) 60 tablet 0     estradiol (ESTRACE) 0.1 MG/GM cream Place 1 g vaginally       fluconazole (DIFLUCAN) 150 MG tablet Take 1 tablet (150 mg) by mouth every 3 days 4 tablet 0     fluticasone (FLONASE) 50 MCG/ACT spray Spray 2 sprays in nostril       fluticasone-salmeterol (ADVAIR DISKUS) 250-50 MCG/DOSE diskus inhaler Inhale 1 puff into the lungs       furosemide (LASIX) 20 MG tablet Take 1 tablet (20 mg) by mouth daily as needed 30 tablet 0     hydrOXYzine (ATARAX) 50 MG tablet Take 1-2 tablets ( mg) by mouth 4 times daily as needed for anxiety Increased dose. For anxiety 180 tablet 0     ipratropium - albuterol 0.5 mg/2.5 mg/3 mL (DUONEB) 0.5-2.5 (3) MG/3ML neb solution Take 1 vial (3 mLs) by nebulization every 6 hours 360 mL 0     lisinopril (PRINIVIL/ZESTRIL) 20 MG tablet Take 1 tablet (20 mg) by mouth daily 90 tablet 1     montelukast (SINGULAIR) 10 MG tablet Take 1 tablet (10 mg) by mouth At Bedtime 30 tablet 0     omeprazole (PRILOSEC) 20 MG CR capsule Take 1 capsule (20 mg) by mouth 2 times daily 180 capsule 0     order for DME Equipment being ordered: Nebulizer 1 Units 0     order for DME Equipment being ordered: Nebulizer machine  Length of need-lifetime 1 Device 0      ranitidine (ZANTAC) 75 MG tablet Take 75 mg by mouth       tiotropium (SPIRIVA HANDIHALER) 18 MCG capsule Inhale contents of one capsule daily. 90 capsule 3     VITAMIN D, CHOLECALCIFEROL, PO Take 2,000 Units by mouth daily       nicotine polacrilex (RA NICOTINE GUM) 2 MG gum Place 2 mg inside cheek as needed for smoking cessation       ondansetron (ZOFRAN) 8 MG tablet Take 0.5-1 tablets (4-8 mg) by mouth every 8 hours as needed for nausea (Patient not taking: Reported on 11/21/2018) 15 tablet 0     predniSONE (DELTASONE) 20 MG tablet Take two tablets (= 40mg) each day for 5 (five) days (Patient not taking: Reported on 11/21/2018) 10 tablet 0     OTC products: None, except as noted above and no recent use of OTC ASA, NSAIDS or Steroids    Allergies   Allergen Reactions     Codeine Nausea and Vomiting     vomiting     Cyclobenzaprine Nausea     Hydrocodone Nausea and Vomiting     Sulfa Drugs Nausea and Vomiting     Nausea     Gabapentin Rash      Latex Allergy: NO    Social History   Substance Use Topics     Smoking status: Light Tobacco Smoker     Years: 30.00     Types: Cigarettes     Last attempt to quit: 9/1/2015     Smokeless tobacco: Never Used      Comment: off/on 5 cigs per day     Alcohol use No     History   Drug Use No       REVIEW OF SYSTEMS:   CONSTITUTIONAL: NEGATIVE for fever, chills, change in weight  EYES: NEGATIVE for vision changes or irritation  ENT/MOUTH: NEGATIVE for ear, mouth and throat problems  RESP: NEGATIVE for significant cough or SOB  CV: NEGATIVE for chest pain, palpitations or peripheral edema  MUSCULOSKELETAL: NEGATIVE for significant arthralgias or myalgia  HEME/ALLERGY/IMMUNE: NEGATIVE for bleeding problems  PSYCHIATRIC: NEGATIVE for changes in mood or affect  ROS otherwise negative    EXAM:   /78  Pulse 69  Temp 97.6  F (36.4  C) (Oral)  Resp 16  Wt 198 lb 12.8 oz (90.2 kg)  LMP 04/15/2016  SpO2 96%  BMI 34.12 kg/m2  GENERAL APPEARANCE: healthy, alert and no  distress  EYES: Eyes grossly normal to inspection, PERRL and conjunctivae and sclerae normal  HENT: ear canals and TM's normal and nose and mouth without ulcers or lesions  RESP: lungs clear to auscultation - no rales, rhonchi or wheezes  CV: regular rate and rhythm, normal S1 S2, no S3 or S4 and no murmur, click or rub   ABDOMEN: soft, nontender, no HSM or masses and bowel sounds normal  MS: extremities normal- no gross deformities noted  SKIN: no suspicious lesions or rashes  NEURO: Normal strength and tone, sensory exam grossly normal, mentation intact and speech normal  PSYCH: mentation appears normal and affect normal/bright    DIAGNOSTICS:   No labs or EKG required for low risk surgery (cataract, skin procedure, breast biopsy, etc)  EKG: Not indicated due to non-vascular surgery and last ekg on 11/4/2018 (within 30 days for CAD history or last year for cardiac risk factors)    Recent Labs   Lab Test  11/04/18   1208  07/30/18   1052   03/27/17   1131   04/26/13   0752   03/21/13   1002   HGB  14.7  14.9   < >  14.2   < >  13.7   < >   --    PLT  404  382   < >  320   < >  336   < >   --    INR   --    --    --   0.90   --   0.92   --    --    NA  141  140   < >  140   < >  137   < >   --    POTASSIUM  3.8  4.6   < >  4.4   < >  4.0   < >   --    CR  0.93  0.71   < >  0.70   < >  0.78   < >   --    A1C   --    --    --    --    --    --    --   5.4    < > = values in this interval not displayed.        IMPRESSION:   Reason for surgery/procedure: abdominal pain, change in stool   Diagnosis/reason for consult: HTN, COPD, Hyperlipidemia,     The proposed surgical procedure is considered LOW risk.    REVISED CARDIAC RISK INDEX  The patient has the following serious cardiovascular risks for perioperative complications such as (MI, PE, VFib and 3  AV Block):  No serious cardiac risks  INTERPRETATION: 0 risks: Class I (very low risk - 0.4% complication rate)    The patient has the following additional risks for  perioperative complications:  No identified additional risks  Morbid obesity      ICD-10-CM    1. Preop general physical exam Z01.818    2. Change in consistency of stool R19.5    3. Abdominal pain, generalized R10.84    4. Essential hypertension with goal blood pressure less than 140/90 I10    5. Chronic obstructive pulmonary disease with (acute) exacerbation (H) J44.1    6. Hyperlipidemia LDL goal <130 E78.5    7. Need for prophylactic vaccination and inoculation against influenza Z23 FLU VACCINE, (RIV4) RECOMBINANT HA  , IM (FluBlok, egg free) [89151]- >18 YRS (FMG recommended  50-64 YRS)     Pneumococcal vaccine 23 valent PPSV23  (Pneumovax) [17597]     ADMIN INFLUENZA (For MEDICARE Patients ONLY) []     ADMIN PNEUMOVAX VACCINE (For MEDICARE Patients ONLY) []       RECOMMENDATIONS:       Pulmonary Risk  Incentive spirometry post op  Respiratory Therapy (Respiratory Care IP Consult)  post op if needed   Advised smoking cessation.   Bring albuterol with to hospital       --Patient is to take all scheduled medications on the day of surgery EXCEPT for modifications listed below.    ACE Inhibitor or Angiotensin Receptor Blocker (ARB) Use  Ace inhibitor or Angiotensin Receptor Blocker (ARB) and will continue this medication     APPROVAL GIVEN to proceed with proposed procedure, without further diagnostic evaluation       Signed Electronically by: Beverley No PA-C    Copy of this evaluation report is provided to requesting physician.    [facility] Preop Guidelines    Revised Cardiac Risk Index    Injectable Influenza Immunization Documentation    1.  Is the person to be vaccinated sick today?   No    2. Does the person to be vaccinated have an allergy to a component   of the vaccine?   No  Egg Allergy Algorithm Link    3. Has the person to be vaccinated ever had a serious reaction   to influenza vaccine in the past?   No    4. Has the person to be vaccinated ever had Guillain-Barré syndrome?    No    Form completed by patient

## 2018-11-21 NOTE — PATIENT INSTRUCTIONS
Take usually morning meds with sip of water  Use inhalers  Bring albuterol with you to the surgery/ endoscopy center.     Before Your Surgery      Call your surgeon if there is any change in your health. This includes signs of a cold or flu (such as a sore throat, runny nose, cough, rash or fever).    Do not smoke, drink alcohol or take over the counter medicine (unless your surgeon or primary care doctor tells you to) for the 24 hours before and after surgery.    If you take prescribed drugs: Follow your doctor s orders about which medicines to take and which to stop until after surgery.    Eating and drinking prior to surgery: follow the instructions from your surgeon    Take a shower or bath the night before surgery. Use the soap your surgeon gave you to gently clean your skin. If you do not have soap from your surgeon, use your regular soap. Do not shave or scrub the surgery site.  Wear clean pajamas and have clean sheets on your bed.

## 2018-11-21 NOTE — MR AVS SNAPSHOT
After Visit Summary   11/21/2018    Swetha Patterson    MRN: [id number]           Patient Information     Date Of Birth          1970        Visit Information        Provider Department      11/21/2018 11:20 AM Beverley No PA-C [facility]        Today's Diagnoses     Preop general physical exam    -  1    Abdominal pain, generalized        Change in consistency of stool          Care Instructions    Take usually morning meds with sip of water  Use inhalers  Bring albuterol with you to the surgery/ endoscopy center.     Before Your Surgery      Call your surgeon if there is any change in your health. This includes signs of a cold or flu (such as a sore throat, runny nose, cough, rash or fever).    Do not smoke, drink alcohol or take over the counter medicine (unless your surgeon or primary care doctor tells you to) for the 24 hours before and after surgery.    If you take prescribed drugs: Follow your doctor s orders about which medicines to take and which to stop until after surgery.    Eating and drinking prior to surgery: follow the instructions from your surgeon    Take a shower or bath the night before surgery. Use the soap your surgeon gave you to gently clean your skin. If you do not have soap from your surgeon, use your regular soap. Do not shave or scrub the surgery site.  Wear clean pajamas and have clean sheets on your bed.           Follow-ups after your visit        Follow-up notes from your care team     Return in about 3 months (around 2/21/2019) for Routine Visit PCP.      Your next 10 appointments already scheduled     Nov 26, 2018   Procedure with Marshall Oakes MD   [facility] (--)    [address] [facility] [id number] [phone]              Who to contact     If you have questions or need follow up information about today's clinic visit or your schedule please contact [facility]  [facility] directly at [phone].  Normal or non-critical lab and imaging results will be communicated to you by MyChart, letter or phone within 4 business days after the clinic has received the results. If you do not hear from us within 7 days, please contact the clinic through MyChart or phone. If you have a critical or abnormal lab result, we will notify you by phone as soon as possible.  Submit refill requests through [employer] or call your pharmacy and they will forward the refill request to us. Please allow 3 business days for your refill to be completed.          Additional Information About Your Visit        Care EveryWhere ID     This is your Care EveryWhere ID. This could be used by other organizations to access your [place] medical records  [id number]        Your Vitals Were     Pulse Temperature Respirations Last Period Pulse Oximetry BMI (Body Mass Index)    69 97.6  F (36.4  C) (Oral) 16 04/15/2016 96% 34.12 kg/m2       Blood Pressure from Last 3 Encounters:   11/21/18 126/78   11/04/18 (!) 190/131   08/22/18 118/78    Weight from Last 3 Encounters:   11/21/18 198 lb 12.8 oz (90.2 kg)   11/20/18 191 lb (86.6 kg)   08/22/18 196 lb 4.8 oz (89 kg)              Today, you had the following     No orders found for display         Today's Medication Changes          These changes are accurate as of 11/21/18 12:03 PM.  If you have any questions, ask your nurse or doctor.               These medicines have changed or have updated prescriptions.        Dose/Directions    doxycycline 100 MG tablet   Commonly known as:  VIBRA-TABS   This may have changed:    - when to take this  - reasons to take this   Used for:  Acne vulgaris, Hidradenitis suppurativa        Dose:  100 mg   Take 1 tablet (100 mg) by mouth 2 times daily   Quantity:  60 tablet   Refills:  0         Stop taking these medicines if you haven't already. Please contact your care team if you have questions.     fluconazole 150 MG tablet   Commonly  known as:  DIFLUCAN   Stopped by:  Beverley No PA-C           ondansetron 8 MG tablet   Commonly known as:  ZOFRAN   Stopped by:  Beverley No PA-C           predniSONE 20 MG tablet   Commonly known as:  DELTASONE   Stopped by:  Beverley No PA-C                    Primary Care Provider Office Phone # Fax #    Roro Janine Chawla [phone]       [facility] [address] [facility] [address]        Equal Access to Services     [facility]: Hadii aad ku hadasho Soomaali, waaxda luqadaha, qaybta kaalmada adeegyada, waxay idiin hayaan adeeg kharash lamary . So [facility] [phone].    ATENCIÓN: Si habla español, tiene a vargas disposición servicios gratuitos de asistencia lingüística. [facility] [phone].    We comply with applicable federal civil rights laws and [place] laws. We do not discriminate on the basis of race, color, national origin, age, disability, sex, sexual orientation, or gender identity.            Thank you!     Thank you for choosing [facility]  for your care. Our goal is always to provide you with excellent care. Hearing back from our patients is one way we can continue to improve our services. Please take a few minutes to complete the written survey that you may receive in the mail after your visit with us. Thank you!             Your Updated Medication List - Protect others around you: Learn how to safely use, store and throw away your medicines at www.disposemymeds.org.          This list is accurate as of 11/21/18 12:03 PM.  Always use your most recent med list.                   Brand Name Dispense Instructions for use Diagnosis    acetaminophen 325 MG tablet    TYLENOL    50 tablet    Take 2 tablets (650 mg) by mouth every 6 hours as needed for other (mild pain)    S/P cervical spinal fusion       ADVAIR DISKUS 250-50 MCG/DOSE diskus inhaler   Generic drug:  fluticasone-salmeterol      Inhale 1 puff into the  lungs        * albuterol (2.5 MG/3ML) 0.083% neb solution     25 vial    Take 1 vial (2.5 mg) by nebulization every 6 hours as needed for shortness of breath / dyspnea or wheezing    Cough       * albuterol 108 (90 Base) MCG/ACT inhaler    VENTOLIN HFA    3 Inhaler    Inhale 2 puffs into the lungs every 6 hours    Shortness of breath       amLODIPine 10 MG tablet    NORVASC    90 tablet    Take 1 tablet (10 mg) by mouth daily    Benign essential hypertension       atorvastatin 80 MG tablet    LIPITOR    30 tablet    Take 1 tablet (80 mg) by mouth daily    Hyperlipidemia LDL goal <130       benzoyl peroxide 10 % topical gel           budesonide-formoterol 160-4.5 MCG/ACT Inhaler    SYMBICORT    3 Inhaler    Inhale 2 puffs into the lungs 2 times daily    Severe chronic obstructive pulmonary disease (H)       cetirizine HCl 10 MG Caps      Take 10 mg by mouth        desonide 0.05 % cream    DESOWEN          doxycycline 100 MG tablet    VIBRA-TABS    60 tablet    Take 1 tablet (100 mg) by mouth 2 times daily    Acne vulgaris, Hidradenitis suppurativa       estradiol 0.1 MG/GM cream    ESTRACE     Place 1 g vaginally        fluticasone 50 MCG/ACT spray    FLONASE     Spray 2 sprays in nostril        furosemide 20 MG tablet    LASIX    30 tablet    Take 1 tablet (20 mg) by mouth daily as needed    Bilateral leg edema       hydrOXYzine 50 MG tablet    ATARAX    180 tablet    Take 1-2 tablets ( mg) by mouth 4 times daily as needed for anxiety Increased dose. For anxiety    Anxiety       ipratropium - albuterol 0.5 mg/2.5 mg/3 mL 0.5-2.5 (3) MG/3ML neb solution    DUONEB    360 mL    Take 1 vial (3 mLs) by nebulization every 6 hours    Pulmonary emphysema, unspecified emphysema type (H)       lisinopril 20 MG tablet    PRINIVIL/ZESTRIL    90 tablet    Take 1 tablet (20 mg) by mouth daily    Essential hypertension with goal blood pressure less than 140/90       montelukast 10 MG tablet    SINGULAIR    30 tablet    Take  1 tablet (10 mg) by mouth At Bedtime    Dust allergy, Severe chronic obstructive pulmonary disease (H)       omeprazole 20 MG CR capsule    priLOSEC    180 capsule    Take 1 capsule (20 mg) by mouth 2 times daily    Acute gastritis, presence of bleeding unspecified, unspecified gastritis type       order for DME     1 Device    Equipment being ordered: Nebulizer machine Length of need-lifetime    Chronic lung disease, Cough, Severe persistent asthma with acute exacerbation       order for DME     1 Units    Equipment being ordered: Nebulizer    Moderate persistent asthma without complication       RA NICOTINE GUM 2 MG gum   Generic drug:  nicotine polacrilex      Place 2 mg inside cheek as needed for smoking cessation        ranitidine 75 MG tablet    ZANTAC     Take 75 mg by mouth        tiotropium 18 MCG capsule    SPIRIVA HANDIHALER    90 capsule    Inhale contents of one capsule daily.        * VITAMIN D (CHOLECALCIFEROL) PO      Take 2,000 Units by mouth daily        * vitamin D 2000 units Caps     30 capsule    Take 2,000 Units by mouth daily    Anxiety       * Notice:  This list has 4 medication(s) that are the same as other medications prescribed for you. Read the directions carefully, and ask your doctor or other care provider to review them with you.

## 2018-11-23 NOTE — H&P (VIEW-ONLY)
[address] [address]  [phone]  Dept: [phone]    PRE-OP EVALUATION:  Today's date: 2018    Swetha Patterson (: 1970) presents for pre-operative evaluation assessment as requested by Dr. Aceves.  She requires evaluation and anesthesia risk assessment prior to undergoing surgery/procedure for treatment of possible chrones .    Fax number for surgical facility: [facility]  Primary Physician: Roro Chawla  Type of Anesthesia Anticipated: General    Patient has a Health Care Directive or Living Will:  NO    Preop Questions 2018   Who is doing your surgery? dr aceves   What are you having done? colonoscopy   Date of Surgery/Procedure:    Facility or Hospital where procedure/surgery will be performed: [facility]   1.  Do you have a history of Heart attack, stroke, stent, coronary bypass surgery, or other heart surgery? No   2.  Do you ever have any pain or discomfort in your chest? No   3.  Do you have a history of  Heart Failure? No   4.   Are you troubled by shortness of breath when:  walking on a level surface, or up a slight hill, or at night? YES - has COPD   5.  Do you currently have a cold, bronchitis or other respiratory infection? No   6.  Do you have a cough, shortness of breath, or wheezing? YES - has COPD   7.  Do you sometimes get pains in the calves of your legs when you walk? No   8. Do you or anyone in your family have previous history of blood clots? No   9.  Do you or does anyone in your family have a serious bleeding problem such as prolonged bleeding following surgeries or cuts? No   10. Have you ever had problems with anemia or been told to take iron pills? No   11. Have you had any abnormal blood loss such as black, tarry or bloody stools, or abnormal vaginal bleeding? No   12. Have you ever had a blood transfusion? No   13. Have you or any of your relatives ever had  problems with anesthesia? No   14. Do you have sleep apnea, excessive snoring or daytime drowsiness? No   15. Do you have any prosthetic heart valves? No   16. Do you have prosthetic joints? No   17. Is there any chance that you may be pregnant? No         HPI:     HPI related to upcoming procedure: colonoscopy needed due to continued abd pain and stool changes       COPD - Patient has a longstanding history of moderate-severe COPD . Patient has been doing well overall noting NO SYMPTOMS and continues on medication regimen consisting of inhalers per med list  without adverse reactions or side effects.                                                                                                         .  HYPERLIPIDEMIA - Patient has a long history of significant Hyperlipidemia requiring medication for treatment with recent poor control. Patient reports no problems or side effects with the medication.                                                                                                                                                       .  HYPERTENSION - Patient has longstanding history of HTN , currently denies any symptoms referable to elevated blood pressure. Specifically denies chest pain, palpitations, dyspnea, orthopnea, PND or peripheral edema. Blood pressure readings have been in normal range. Current medication regimen is as listed below. Patient denies any side effects of medication.                                                                                                                                                                                          .    MEDICAL HISTORY:     Patient Active Problem List    Diagnosis Date Noted     Vocal cord polyp 06/28/2018     Priority: Medium     Chronic obstructive pulmonary disease with (acute) exacerbation (H) 06/28/2018     Priority: Medium     Status post cervical spinal fusion 05/01/2018     Priority: Medium     Suicide attempt (H)  "02/08/2018     Priority: Medium     Intentional drug overdose (H) 02/08/2018     Priority: Medium     Cervical HNP C5-6 11/09/2017     Priority: Medium     Opioid type dependence, continuous (H) 11/09/2017     Priority: Medium     Essential hypertension with goal blood pressure less than 140/90 11/01/2016     Priority: Medium     DIAZ (nonalcoholic steatohepatitis) 06/17/2016     Priority: Medium     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  Doesn't drink, but relapsed (doesn't remember).  Was on Valium for 4 years (until October 2017). Xanax since 11/2017.  Managed by hospital.        Past Medical History:   Diagnosis Date     Anxiety state, unspecified      Asthma      Chronic pain     back pain from cyst     Contact dermatitis and other eczema, due to unspecified cause      COPD (chronic obstructive pulmonary disease) (H)      Depressive disorder, not elsewhere classified      Emphysema with chronic bronchitis (H)      Esophageal reflux      Family history of ischemic heart disease      Gastro-oesophageal reflux disease      History of emphysema      Hoarseness      HTN, goal below 140/90     No cardilogist     Hyperlipidemia LDL goal <130      Liver disease     \"fatty liver\"     Other chronic pain     chest, from coughing     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)      PONV (postoperative nausea and vomiting)      Past Surgical History:   Procedure Laterality Date     ARTHROSCOPY SHOULDER DECOMPRESSION Left 10/21/2015    Procedure: ARTHROSCOPY " SHOULDER DECOMPRESSION;  Surgeon: Julien Milian MD;  Location: [facility] OR     BRONCHIAL THERMOPLASTY N/A 11/14/2014    Procedure: BRONCHIAL THERMOPLASTY;  Surgeon: Ward Whitaker MD;  Location: [facility]     BRONCHIAL THERMOPLASTY N/A 12/19/2014    Procedure: BRONCHIAL THERMOPLASTY;  Surgeon: Ward Whitaker MD;  Location: [facility]     BRONCHIAL THERMOPLASTY N/A 2/6/2015    Procedure: BRONCHIAL THERMOPLASTY;  Surgeon: Ward Whitaker MD;  Location: [facility]     C APPENDECTOMY  at age 17     DISCECTOMY, FUSION CERVICAL ANTERIOR ONE LEVEL, COMBINED N/A 5/1/2018    Procedure: COMBINED DISCECTOMY, FUSION CERVICAL ANTERIOR ONE LEVEL;  1.  C5-C6 anterior cervical diskectomy and fusion.    2.  C5-C6 application of intervertebral biomechanical device for interbody fusion purposes.    3.  C5-C6 anterior instrumentation using the standard Rkistin InViZia 24 mm plate with four associated bone screws, 12 mm in length.  Inferior screws are fixed.  Superior screws are va     EXCISE NODE MEDIASTINAL  4/26/2013    Procedure: EXCISE NODE MEDIASTINAL;;  Surgeon: Av Peña MD;  Location:  OR     LAPAROSCOPIC CHOLECYSTECTOMY N/A 10/19/2017    Procedure: LAPAROSCOPIC CHOLECYSTECTOMY;  LAPAROSCOPIC CHOLECYSTECTOMY;  Surgeon: Ney Jerry MD;  Location:  OR     THORACOSCOPY  4/26/2013    Procedure: THORACOSCOPY;  LEFT VIDEO ASSISTED THORACOSCOPY, RESECTION OF POSTERIOR MEDIASTINAL MASS;  Surgeon: Av Peña MD;  Location:  OR     TONSILLECTOMY  as a kid     TONSILLECTOMY       Current Outpatient Prescriptions   Medication Sig Dispense Refill     acetaminophen (TYLENOL) 325 MG tablet Take 2 tablets (650 mg) by mouth every 6 hours as needed for other (mild pain) 50 tablet 0     albuterol (2.5 MG/3ML) 0.083% neb solution Take 1 vial (2.5 mg) by nebulization every 6 hours as needed for shortness of breath / dyspnea or wheezing 25 vial 3     albuterol (VENTOLIN HFA) 108 (90 Base) MCG/ACT  Inhaler Inhale 2 puffs into the lungs every 6 hours 3 Inhaler 3     amLODIPine (NORVASC) 10 MG tablet Take 1 tablet (10 mg) by mouth daily 90 tablet 1     atorvastatin (LIPITOR) 80 MG tablet Take 1 tablet (80 mg) by mouth daily 30 tablet 0     benzoyl peroxide 10 % topical gel        budesonide-formoterol (SYMBICORT) 160-4.5 MCG/ACT Inhaler Inhale 2 puffs into the lungs 2 times daily 3 Inhaler 1     cetirizine HCl 10 MG CAPS Take 10 mg by mouth       Cholecalciferol (VITAMIN D) 2000 units CAPS Take 2,000 Units by mouth daily 30 capsule 3     desonide (DESOWEN) 0.05 % cream        doxycycline (VIBRA-TABS) 100 MG tablet Take 1 tablet (100 mg) by mouth 2 times daily (Patient taking differently: Take 100 mg by mouth 2 times daily as needed ) 60 tablet 0     estradiol (ESTRACE) 0.1 MG/GM cream Place 1 g vaginally       fluconazole (DIFLUCAN) 150 MG tablet Take 1 tablet (150 mg) by mouth every 3 days 4 tablet 0     fluticasone (FLONASE) 50 MCG/ACT spray Spray 2 sprays in nostril       fluticasone-salmeterol (ADVAIR DISKUS) 250-50 MCG/DOSE diskus inhaler Inhale 1 puff into the lungs       furosemide (LASIX) 20 MG tablet Take 1 tablet (20 mg) by mouth daily as needed 30 tablet 0     hydrOXYzine (ATARAX) 50 MG tablet Take 1-2 tablets ( mg) by mouth 4 times daily as needed for anxiety Increased dose. For anxiety 180 tablet 0     ipratropium - albuterol 0.5 mg/2.5 mg/3 mL (DUONEB) 0.5-2.5 (3) MG/3ML neb solution Take 1 vial (3 mLs) by nebulization every 6 hours 360 mL 0     lisinopril (PRINIVIL/ZESTRIL) 20 MG tablet Take 1 tablet (20 mg) by mouth daily 90 tablet 1     montelukast (SINGULAIR) 10 MG tablet Take 1 tablet (10 mg) by mouth At Bedtime 30 tablet 0     omeprazole (PRILOSEC) 20 MG CR capsule Take 1 capsule (20 mg) by mouth 2 times daily 180 capsule 0     order for DME Equipment being ordered: Nebulizer 1 Units 0     order for DME Equipment being ordered: Nebulizer machine  Length of need-lifetime 1 Device 0      ranitidine (ZANTAC) 75 MG tablet Take 75 mg by mouth       tiotropium (SPIRIVA HANDIHALER) 18 MCG capsule Inhale contents of one capsule daily. 90 capsule 3     VITAMIN D, CHOLECALCIFEROL, PO Take 2,000 Units by mouth daily       nicotine polacrilex (RA NICOTINE GUM) 2 MG gum Place 2 mg inside cheek as needed for smoking cessation       ondansetron (ZOFRAN) 8 MG tablet Take 0.5-1 tablets (4-8 mg) by mouth every 8 hours as needed for nausea (Patient not taking: Reported on 11/21/2018) 15 tablet 0     predniSONE (DELTASONE) 20 MG tablet Take two tablets (= 40mg) each day for 5 (five) days (Patient not taking: Reported on 11/21/2018) 10 tablet 0     OTC products: None, except as noted above and no recent use of OTC ASA, NSAIDS or Steroids    Allergies   Allergen Reactions     Codeine Nausea and Vomiting     vomiting     Cyclobenzaprine Nausea     Hydrocodone Nausea and Vomiting     Sulfa Drugs Nausea and Vomiting     Nausea     Gabapentin Rash      Latex Allergy: NO    Social History   Substance Use Topics     Smoking status: Light Tobacco Smoker     Years: 30.00     Types: Cigarettes     Last attempt to quit: 9/1/2015     Smokeless tobacco: Never Used      Comment: off/on 5 cigs per day     Alcohol use No     History   Drug Use No       REVIEW OF SYSTEMS:   CONSTITUTIONAL: NEGATIVE for fever, chills, change in weight  EYES: NEGATIVE for vision changes or irritation  ENT/MOUTH: NEGATIVE for ear, mouth and throat problems  RESP: NEGATIVE for significant cough or SOB  CV: NEGATIVE for chest pain, palpitations or peripheral edema  MUSCULOSKELETAL: NEGATIVE for significant arthralgias or myalgia  HEME/ALLERGY/IMMUNE: NEGATIVE for bleeding problems  PSYCHIATRIC: NEGATIVE for changes in mood or affect  ROS otherwise negative    EXAM:   /78  Pulse 69  Temp 97.6  F (36.4  C) (Oral)  Resp 16  Wt 198 lb 12.8 oz (90.2 kg)  LMP 04/15/2016  SpO2 96%  BMI 34.12 kg/m2  GENERAL APPEARANCE: healthy, alert and no  distress  EYES: Eyes grossly normal to inspection, PERRL and conjunctivae and sclerae normal  HENT: ear canals and TM's normal and nose and mouth without ulcers or lesions  RESP: lungs clear to auscultation - no rales, rhonchi or wheezes  CV: regular rate and rhythm, normal S1 S2, no S3 or S4 and no murmur, click or rub   ABDOMEN: soft, nontender, no HSM or masses and bowel sounds normal  MS: extremities normal- no gross deformities noted  SKIN: no suspicious lesions or rashes  NEURO: Normal strength and tone, sensory exam grossly normal, mentation intact and speech normal  PSYCH: mentation appears normal and affect normal/bright    DIAGNOSTICS:   No labs or EKG required for low risk surgery (cataract, skin procedure, breast biopsy, etc)  EKG: Not indicated due to non-vascular surgery and last ekg on 11/4/2018 (within 30 days for CAD history or last year for cardiac risk factors)    Recent Labs   Lab Test  11/04/18   1208  07/30/18   1052   03/27/17   1131   04/26/13   0752   03/21/13   1002   HGB  14.7  14.9   < >  14.2   < >  13.7   < >   --    PLT  404  382   < >  320   < >  336   < >   --    INR   --    --    --   0.90   --   0.92   --    --    NA  141  140   < >  140   < >  137   < >   --    POTASSIUM  3.8  4.6   < >  4.4   < >  4.0   < >   --    CR  0.93  0.71   < >  0.70   < >  0.78   < >   --    A1C   --    --    --    --    --    --    --   5.4    < > = values in this interval not displayed.        IMPRESSION:   Reason for surgery/procedure: abdominal pain, change in stool   Diagnosis/reason for consult: HTN, COPD, Hyperlipidemia,     The proposed surgical procedure is considered LOW risk.    REVISED CARDIAC RISK INDEX  The patient has the following serious cardiovascular risks for perioperative complications such as (MI, PE, VFib and 3  AV Block):  No serious cardiac risks  INTERPRETATION: 0 risks: Class I (very low risk - 0.4% complication rate)    The patient has the following additional risks for  perioperative complications:  No identified additional risks  Morbid obesity      ICD-10-CM    1. Preop general physical exam Z01.818    2. Change in consistency of stool R19.5    3. Abdominal pain, generalized R10.84    4. Essential hypertension with goal blood pressure less than 140/90 I10    5. Chronic obstructive pulmonary disease with (acute) exacerbation (H) J44.1    6. Hyperlipidemia LDL goal <130 E78.5    7. Need for prophylactic vaccination and inoculation against influenza Z23 FLU VACCINE, (RIV4) RECOMBINANT HA  , IM (FluBlok, egg free) [51914]- >18 YRS ([facility] recommended  50-64 YRS)     Pneumococcal vaccine 23 valent PPSV23  (Pneumovax) [13959]     ADMIN INFLUENZA (For MEDICARE Patients ONLY) []     ADMIN PNEUMOVAX VACCINE (For MEDICARE Patients ONLY) []       RECOMMENDATIONS:       Pulmonary Risk  Incentive spirometry post op  Respiratory Therapy (Respiratory Care IP Consult)  post op if needed   Advised smoking cessation.   Bring albuterol with to hospital       --Patient is to take all scheduled medications on the day of surgery EXCEPT for modifications listed below.    ACE Inhibitor or Angiotensin Receptor Blocker (ARB) Use  Ace inhibitor or Angiotensin Receptor Blocker (ARB) and will continue this medication     APPROVAL GIVEN to proceed with proposed procedure, without further diagnostic evaluation       Signed Electronically by: Beverley No PA-C    Copy of this evaluation report is provided to requesting physician.    [facility] Preop Guidelines    Revised Cardiac Risk Index    Injectable Influenza Immunization Documentation    1.  Is the person to be vaccinated sick today?   No    2. Does the person to be vaccinated have an allergy to a component   of the vaccine?   No  Egg Allergy Algorithm Link    3. Has the person to be vaccinated ever had a serious reaction   to influenza vaccine in the past?   No    4. Has the person to be vaccinated ever had Guillain-Barré syndrome?    No    Form completed by patient

## 2018-11-26 ENCOUNTER — ANESTHESIA (OUTPATIENT)
Dept: SURGERY | Facility: CLINIC | Age: 48
End: 2018-11-26
Payer: MEDICARE

## 2018-11-26 ENCOUNTER — HOSPITAL ENCOUNTER (OUTPATIENT)
Facility: CLINIC | Age: 48
Discharge: HOME | End: 2018-11-26
Attending: INTERNAL MEDICINE | Admitting: INTERNAL MEDICINE
Payer: MEDICARE

## 2018-11-26 ENCOUNTER — TRANSFERRED RECORDS (OUTPATIENT)
Dept: HEALTH INFORMATION MANAGEMENT | Facility: CLINIC | Age: 48
End: 2018-11-26

## 2018-11-26 ENCOUNTER — ANESTHESIA EVENT (OUTPATIENT)
Dept: SURGERY | Facility: CLINIC | Age: 48
End: 2018-11-26
Payer: MEDICARE

## 2018-11-26 ENCOUNTER — SURGERY (OUTPATIENT)
Age: 48
End: 2018-11-26

## 2018-11-26 VITALS
DIASTOLIC BLOOD PRESSURE: 82 MMHG | RESPIRATION RATE: 16 BRPM | WEIGHT: 201 LBS | OXYGEN SATURATION: 98 % | BODY MASS INDEX: 34.31 KG/M2 | SYSTOLIC BLOOD PRESSURE: 132 MMHG | TEMPERATURE: 98.1 F | HEIGHT: 64 IN

## 2018-11-26 LAB — COLONOSCOPY: NORMAL

## 2018-11-26 PROCEDURE — 25000128 H RX IP 250 OP 636: Performed by: NURSE ANESTHETIST, CERTIFIED REGISTERED

## 2018-11-26 PROCEDURE — 37000009 ZZH ANESTHESIA TECHNICAL FEE, EACH ADDTL 15 MIN: Performed by: INTERNAL MEDICINE

## 2018-11-26 PROCEDURE — 40000306 ZZH STATISTIC PRE PROC ASSESS II: Performed by: INTERNAL MEDICINE

## 2018-11-26 PROCEDURE — 88305 TISSUE EXAM BY PATHOLOGIST: CPT | Performed by: INTERNAL MEDICINE

## 2018-11-26 PROCEDURE — 25000125 ZZHC RX 250: Performed by: NURSE ANESTHETIST, CERTIFIED REGISTERED

## 2018-11-26 PROCEDURE — 88305 TISSUE EXAM BY PATHOLOGIST: CPT | Mod: 26 | Performed by: INTERNAL MEDICINE

## 2018-11-26 PROCEDURE — 71000027 ZZH RECOVERY PHASE 2 EACH 15 MINS: Performed by: INTERNAL MEDICINE

## 2018-11-26 PROCEDURE — 27210794 ZZH OR GENERAL SUPPLY STERILE: Performed by: INTERNAL MEDICINE

## 2018-11-26 PROCEDURE — 36000052 ZZH SURGERY LEVEL 2 EA 15 ADDTL MIN: Performed by: INTERNAL MEDICINE

## 2018-11-26 PROCEDURE — 36000050 ZZH SURGERY LEVEL 2 1ST 30 MIN: Performed by: INTERNAL MEDICINE

## 2018-11-26 PROCEDURE — 37000008 ZZH ANESTHESIA TECHNICAL FEE, 1ST 30 MIN: Performed by: INTERNAL MEDICINE

## 2018-11-26 PROCEDURE — 40000037 ZZH STATISTIC COLONOSCOPY (OR PROCEDURE): Performed by: INTERNAL MEDICINE

## 2018-11-26 RX ORDER — GLYCOPYRROLATE 0.2 MG/ML
INJECTION, SOLUTION INTRAMUSCULAR; INTRAVENOUS PRN
Status: DISCONTINUED | OUTPATIENT
Start: 2018-11-26 | End: 2018-11-26

## 2018-11-26 RX ORDER — NALOXONE HYDROCHLORIDE 0.4 MG/ML
.1-.4 INJECTION, SOLUTION INTRAMUSCULAR; INTRAVENOUS; SUBCUTANEOUS
Status: DISCONTINUED | OUTPATIENT
Start: 2018-11-26 | End: 2018-11-26 | Stop reason: HOSPADM

## 2018-11-26 RX ORDER — SODIUM CHLORIDE, SODIUM LACTATE, POTASSIUM CHLORIDE, CALCIUM CHLORIDE 600; 310; 30; 20 MG/100ML; MG/100ML; MG/100ML; MG/100ML
INJECTION, SOLUTION INTRAVENOUS CONTINUOUS
Status: DISCONTINUED | OUTPATIENT
Start: 2018-11-26 | End: 2018-11-26 | Stop reason: HOSPADM

## 2018-11-26 RX ORDER — ONDANSETRON 2 MG/ML
4 INJECTION INTRAMUSCULAR; INTRAVENOUS
Status: DISCONTINUED | OUTPATIENT
Start: 2018-11-26 | End: 2018-11-26 | Stop reason: HOSPADM

## 2018-11-26 RX ORDER — FENTANYL CITRATE 50 UG/ML
INJECTION, SOLUTION INTRAMUSCULAR; INTRAVENOUS PRN
Status: DISCONTINUED | OUTPATIENT
Start: 2018-11-26 | End: 2018-11-26

## 2018-11-26 RX ORDER — LABETALOL HYDROCHLORIDE 5 MG/ML
10 INJECTION, SOLUTION INTRAVENOUS
Status: DISCONTINUED | OUTPATIENT
Start: 2018-11-26 | End: 2018-11-26 | Stop reason: HOSPADM

## 2018-11-26 RX ORDER — PROPOFOL 10 MG/ML
INJECTION, EMULSION INTRAVENOUS CONTINUOUS PRN
Status: DISCONTINUED | OUTPATIENT
Start: 2018-11-26 | End: 2018-11-26

## 2018-11-26 RX ORDER — HYDRALAZINE HYDROCHLORIDE 20 MG/ML
2.5-5 INJECTION INTRAMUSCULAR; INTRAVENOUS EVERY 10 MIN PRN
Status: DISCONTINUED | OUTPATIENT
Start: 2018-11-26 | End: 2018-11-26 | Stop reason: HOSPADM

## 2018-11-26 RX ORDER — LIDOCAINE 40 MG/G
CREAM TOPICAL
Status: DISCONTINUED | OUTPATIENT
Start: 2018-11-26 | End: 2018-11-26 | Stop reason: HOSPADM

## 2018-11-26 RX ORDER — FLUMAZENIL 0.1 MG/ML
0.2 INJECTION, SOLUTION INTRAVENOUS
Status: DISCONTINUED | OUTPATIENT
Start: 2018-11-26 | End: 2018-11-26 | Stop reason: HOSPADM

## 2018-11-26 RX ORDER — ONDANSETRON 2 MG/ML
4 INJECTION INTRAMUSCULAR; INTRAVENOUS EVERY 30 MIN PRN
Status: DISCONTINUED | OUTPATIENT
Start: 2018-11-26 | End: 2018-11-26

## 2018-11-26 RX ORDER — ONDANSETRON 4 MG/1
4 TABLET, ORALLY DISINTEGRATING ORAL EVERY 6 HOURS PRN
Status: DISCONTINUED | OUTPATIENT
Start: 2018-11-26 | End: 2018-11-26 | Stop reason: HOSPADM

## 2018-11-26 RX ORDER — PROPOFOL 10 MG/ML
INJECTION, EMULSION INTRAVENOUS PRN
Status: DISCONTINUED | OUTPATIENT
Start: 2018-11-26 | End: 2018-11-26

## 2018-11-26 RX ORDER — ONDANSETRON 2 MG/ML
4 INJECTION INTRAMUSCULAR; INTRAVENOUS EVERY 6 HOURS PRN
Status: DISCONTINUED | OUTPATIENT
Start: 2018-11-26 | End: 2018-11-26 | Stop reason: HOSPADM

## 2018-11-26 RX ORDER — SODIUM CHLORIDE, SODIUM LACTATE, POTASSIUM CHLORIDE, CALCIUM CHLORIDE 600; 310; 30; 20 MG/100ML; MG/100ML; MG/100ML; MG/100ML
INJECTION, SOLUTION INTRAVENOUS CONTINUOUS PRN
Status: DISCONTINUED | OUTPATIENT
Start: 2018-11-26 | End: 2018-11-26

## 2018-11-26 RX ORDER — ONDANSETRON 4 MG/1
4 TABLET, ORALLY DISINTEGRATING ORAL EVERY 30 MIN PRN
Status: DISCONTINUED | OUTPATIENT
Start: 2018-11-26 | End: 2018-11-26

## 2018-11-26 RX ORDER — FENTANYL CITRATE 50 UG/ML
25-50 INJECTION, SOLUTION INTRAMUSCULAR; INTRAVENOUS
Status: DISCONTINUED | OUTPATIENT
Start: 2018-11-26 | End: 2018-11-26 | Stop reason: HOSPADM

## 2018-11-26 RX ORDER — SCOLOPAMINE TRANSDERMAL SYSTEM 1 MG/1
1 PATCH, EXTENDED RELEASE TRANSDERMAL ONCE
Status: DISCONTINUED | OUTPATIENT
Start: 2018-11-26 | End: 2018-11-26 | Stop reason: HOSPADM

## 2018-11-26 RX ORDER — ONDANSETRON 2 MG/ML
INJECTION INTRAMUSCULAR; INTRAVENOUS PRN
Status: DISCONTINUED | OUTPATIENT
Start: 2018-11-26 | End: 2018-11-26

## 2018-11-26 RX ORDER — NALOXONE HYDROCHLORIDE 0.4 MG/ML
.1-.4 INJECTION, SOLUTION INTRAMUSCULAR; INTRAVENOUS; SUBCUTANEOUS
Status: DISCONTINUED | OUTPATIENT
Start: 2018-11-26 | End: 2018-11-26

## 2018-11-26 RX ORDER — KETAMINE HYDROCHLORIDE 10 MG/ML
INJECTION, SOLUTION INTRAMUSCULAR; INTRAVENOUS PRN
Status: DISCONTINUED | OUTPATIENT
Start: 2018-11-26 | End: 2018-11-26

## 2018-11-26 RX ORDER — LIDOCAINE HYDROCHLORIDE 10 MG/ML
INJECTION, SOLUTION INFILTRATION; PERINEURAL PRN
Status: DISCONTINUED | OUTPATIENT
Start: 2018-11-26 | End: 2018-11-26

## 2018-11-26 RX ORDER — HYDROMORPHONE HYDROCHLORIDE 1 MG/ML
.3-.5 INJECTION, SOLUTION INTRAMUSCULAR; INTRAVENOUS; SUBCUTANEOUS EVERY 10 MIN PRN
Status: DISCONTINUED | OUTPATIENT
Start: 2018-11-26 | End: 2018-11-26 | Stop reason: HOSPADM

## 2018-11-26 RX ORDER — DIMENHYDRINATE 50 MG/ML
25 INJECTION, SOLUTION INTRAMUSCULAR; INTRAVENOUS
Status: DISCONTINUED | OUTPATIENT
Start: 2018-11-26 | End: 2018-11-26 | Stop reason: HOSPADM

## 2018-11-26 RX ORDER — MEPERIDINE HYDROCHLORIDE 25 MG/ML
12.5 INJECTION INTRAMUSCULAR; INTRAVENOUS; SUBCUTANEOUS
Status: DISCONTINUED | OUTPATIENT
Start: 2018-11-26 | End: 2018-11-26 | Stop reason: HOSPADM

## 2018-11-26 RX ADMIN — PROPOFOL 50 MG: 10 INJECTION, EMULSION INTRAVENOUS at 13:04

## 2018-11-26 RX ADMIN — KETAMINE HYDROCHLORIDE 20 MG: 10 INJECTION, SOLUTION INTRAMUSCULAR; INTRAVENOUS at 12:58

## 2018-11-26 RX ADMIN — SODIUM CHLORIDE, POTASSIUM CHLORIDE, SODIUM LACTATE AND CALCIUM CHLORIDE: 600; 310; 30; 20 INJECTION, SOLUTION INTRAVENOUS at 12:40

## 2018-11-26 RX ADMIN — MIDAZOLAM 0.5 MG: 1 INJECTION INTRAMUSCULAR; INTRAVENOUS at 13:05

## 2018-11-26 RX ADMIN — MIDAZOLAM 0.5 MG: 1 INJECTION INTRAMUSCULAR; INTRAVENOUS at 13:10

## 2018-11-26 RX ADMIN — FENTANYL CITRATE 50 MCG: 50 INJECTION, SOLUTION INTRAMUSCULAR; INTRAVENOUS at 13:05

## 2018-11-26 RX ADMIN — GLYCOPYRROLATE 0.2 MG: 0.2 INJECTION, SOLUTION INTRAMUSCULAR; INTRAVENOUS at 12:56

## 2018-11-26 RX ADMIN — FENTANYL CITRATE 50 MCG: 50 INJECTION, SOLUTION INTRAMUSCULAR; INTRAVENOUS at 13:00

## 2018-11-26 RX ADMIN — PROPOFOL 50 MG: 10 INJECTION, EMULSION INTRAVENOUS at 13:00

## 2018-11-26 RX ADMIN — MIDAZOLAM 2 MG: 1 INJECTION INTRAMUSCULAR; INTRAVENOUS at 12:53

## 2018-11-26 RX ADMIN — KETAMINE HYDROCHLORIDE 10 MG: 10 INJECTION, SOLUTION INTRAMUSCULAR; INTRAVENOUS at 13:06

## 2018-11-26 RX ADMIN — PROPOFOL 80 MCG/KG/MIN: 10 INJECTION, EMULSION INTRAVENOUS at 13:00

## 2018-11-26 RX ADMIN — PROPOFOL 50 MG: 10 INJECTION, EMULSION INTRAVENOUS at 13:10

## 2018-11-26 RX ADMIN — LIDOCAINE HYDROCHLORIDE 50 MG: 10 INJECTION, SOLUTION INFILTRATION; PERINEURAL at 13:00

## 2018-11-26 RX ADMIN — ONDANSETRON 4 MG: 2 INJECTION INTRAMUSCULAR; INTRAVENOUS at 13:12

## 2018-11-26 ASSESSMENT — COPD QUESTIONNAIRES
COPD: 1
CAT_SEVERITY: MILD

## 2018-11-26 ASSESSMENT — ENCOUNTER SYMPTOMS
STRIDOR: 0
SEIZURES: 0
DYSRHYTHMIAS: 0

## 2018-11-26 ASSESSMENT — LIFESTYLE VARIABLES: TOBACCO_USE: 1

## 2018-11-26 NOTE — ANESTHESIA PREPROCEDURE EVALUATION
PAC NOTE:       ANESTHESIA PRE EVALUATION:  Anesthesia Evaluation     . Pt has had prior anesthetic. Type: General    History of anesthetic complications   - PONV        ROS/MED HX    ENT/Pulmonary:     (+)AJ risk factors hypertension, obese, tobacco use, Current use Intermittent asthma mild COPD, , . .    Neurologic:     (+)neuropathy    (-) seizures and CVA   Cardiovascular:     (+) Dyslipidemia, hypertension----. : . . . :. . Previous cardiac testing date:results:date: results:ECG reviewed date:11/18 results:Sinus.  Short NV.  Nonspecific ST changes. date: results:         (-) CAD, arrhythmias and valvular problems/murmurs   METS/Exercise Tolerance:     Hematologic: Comments: Lab Test        11/04/18 07/30/18 02/09/18      --          03/27/17      --          04/26/13                       1208          1052          0709           --           1131           --           0752          WBC          15.4*        8.4          6.7            < >        9.8            < >        10.9          HGB          14.7         14.9         12.1           < >        14.2           < >        13.7          MCV          92           93           93             < >        92             < >        88            PLT          404          382          280            < >        320            < >        336           INR           --           --           --           --          0.90          --          0.92           < > = values in this interval not displayed.                  Lab Test        11/04/18 07/30/18 06/28/18                       1208          1052          1330          NA           141          140          143           POTASSIUM    3.8          4.6          4.3           CHLORIDE     110*         107          108           CO2          24           25           29            BUN          26           18           13            CR           0.93         0.71         0.81          ANIONGAP     7             8            6             GENE          8.7          8.9          9.0           GLC          83           95           90           - neg hematologic  ROS       Musculoskeletal:  - neg musculoskeletal ROS       GI/Hepatic:     (+) GERD Asymptomatic on medication, hepatitis type Other, liver disease,       Renal/Genitourinary:  - ROS Renal section negative       Endo:  - neg endo ROS   (+) Obesity, .   (-) Type I DM, Type II DM, thyroid disease and chronic steroid usage   Psychiatric:     (+) psychiatric history anxiety and depression      Infectious Disease:  - neg infectious disease ROS       Malignancy:      - no malignancy   Other:    (+) H/O Chronic Pain,                   Physical Exam  Normal systems: cardiovascular, pulmonary and dental    Airway   Mallampati: II  TM distance: >3 FB  Neck ROM: full    Dental     Cardiovascular   Rhythm and rate: regular and normal  (-) no friction rub, no systolic click and no murmur    Pulmonary    breath sounds clear to auscultation(-) no rhonchi, no decreased breath sounds, no wheezes, no rales and no stridor             Anesthesia Plan      History & Physical Review  History and physical reviewed and following examination; no interval change.    ASA Status:  3 .    NPO Status:  > 8 hours    Plan for MAC with Intravenous induction. Reason for MAC:  Extreme anxiety (QS)  PONV prophylaxis:  Scopolamine patch       Postoperative Care  Postoperative pain management:  IV analgesics.      Consents  Anesthetic plan, risks, benefits and alternatives discussed with:  Patient..                            .

## 2018-11-26 NOTE — DISCHARGE INSTRUCTIONS
Transderm Scop (Scopolamine) Patch    The Transderm Scop patch placed behind your ear helps to prevent nausea and vomiting associated with the use of anesthesia and certain analgesics used during or after many types surgery.  You will need to remove this patch after 3 days.  However, it may be removed sooner if you are no longer concerned about nausea or vomiting.      The most common side effects:  ?  dryness of the mouth  ?  drowsiness  ?  blurred vision  ?  dilation of pupils  ?  disorientation, memory disturbances and/or confusion  ?  dizziness  ?  restlessness  ?  hallucinations  ?  difficulty urinating  ?  skin rash  ?  red or painful eyes    Avoid drinking alcohol while using Transderm Scop patch.  Be careful about driving or operating any machinery while using this medication since it may make you drowsy.  In the unlikely event that you experience pain in the eye, which may be accompanied by widening of the pupil, eye redness and blurred vision, remove the Transderm Scop Patch immediately and consult your doctor.    Removal of Transderm Scop Patch:  To remove the patch simply peel it off your skin.  Be sure to wash your hands and the area behind your ear thoroughly with soap and water.  The Transderm Scop Patch will continue to have some active ingredient after use.  Therefore, to avoid accidental contact or ingestion by children or pets, fold the used patch in half with the sticky side together and dispose in the trash out of reach of children and pets.    GENERAL ANESTHESIA OR SEDATION ADULT DISCHARGE INSTRUCTIONS   SPECIAL PRECAUTIONS FOR 24 HOURS AFTER SURGERY    IT IS NOT UNUSUAL TO FEEL LIGHT-HEADED OR FAINT, UP TO 24 HOURS AFTER SURGERY OR WHILE TAKING PAIN MEDICATION.  IF YOU HAVE THESE SYMPTOMS; SIT FOR A FEW MINUTES BEFORE STANDING AND HAVE SOMEONE ASSIST YOU WHEN YOU GET UP TO WALK OR USE THE BATHROOM.    YOU SHOULD REST AND RELAX FOR THE NEXT 24 HOURS AND YOU MUST MAKE ARRANGEMENTS TO HAVE  SOMEONE STAY WITH YOU FOR AT LEAST 24 HOURS AFTER YOUR DISCHARGE.  AVOID HAZARDOUS AND STRENUOUS ACTIVITIES.  DO NOT MAKE IMPORTANT DECISIONS FOR 24 HOURS.    DO NOT DRIVE ANY VEHICLE OR OPERATE MECHANICAL EQUIPMENT FOR 24 HOURS FOLLOWING THE END OF YOUR SURGERY.  EVEN THOUGH YOU MAY FEEL NORMAL, YOUR REACTIONS MAY BE AFFECTED BY THE MEDICATION YOU HAVE RECEIVED.    DO NOT DRINK ALCOHOLIC BEVERAGES FOR 24 HOURS FOLLOWING YOUR SURGERY.    DRINK CLEAR LIQUIDS (APPLE JUICE, GINGER ALE, 7-UP, BROTH, ETC.).  PROGRESS TO YOUR REGULAR DIET AS YOU FEEL ABLE.    YOU MAY HAVE A DRY MOUTH, A SORE THROAT, MUSCLES ACHES OR TROUBLE SLEEPING.  THESE SHOULD GO AWAY AFTER 24 HOURS.    CALL YOUR DOCTOR FOR ANY OF THE FOLLOWING:  SIGNS OF INFECTION (FEVER, GROWING TENDERNESS AT THE SURGERY SITE, A LARGE AMOUNT OF DRAINAGE OR BLEEDING, SEVERE PAIN, FOUL-SMELLING DRAINAGE, REDNESS OR SWELLING.    IT HAS BEEN OVER 8 TO 10 HOURS SINCE SURGERY AND YOU ARE STILL NOT ABLE TO URINATE (PASS WATER).     COLONOSCOPY DISCHARGE INSTRUCTIONS    You may not drive, use heavy equipment or consume alcohol for 24 hours because the drugs you were given may cause dizziness, drowsiness, forgetfulness and slower reaction time.    You may resume your regular diet and medications.    If you had a biopsy or polypectomy done, do not take aspirin, aleve (naproxen) or ibuprofen products for the next 10 days.  Tylenol (acetaminophen) is safe to take.    What to watch for:    Problems rarely occur after the exam.  It is important for you to be aware of the early signs of a possible complication.  Call your doctor immediately if you notice any of the followin.  Unusual or persistent abdominal pain (pain that does not move around like a gas pain).    2.  Passing bright red blood from your rectum.    3.  Black or bloody stools.    4.  Temperature above 100.6 degrees F (37.5 degrees C)    If you fell this has become a medical emergency please call 911.      YOSELYN MICHAEL M.D.   [facility] PHONE NUMBER:  [phone]

## 2018-11-26 NOTE — ANESTHESIA CARE TRANSFER NOTE
Patient: Swetha Patterson    Procedure(s):  COLONOSCOPY ([facility])    Diagnosis: Change in bowel movements  Diagnosis Additional Information: No value filed.    Anesthesia Type:   MAC     Note:  Airway :Room Air  Patient transferred to:Phase II  Comments: VSS, coughing and airway patent, report to RN, awake and alert. Handoff Report: Identifed the Patient, Identified the Reponsible Provider, Reviewed the pertinent medical history, Discussed the surgical course, Reviewed Intra-OP anesthesia mangement and issues during anesthesia and Allowed opportunity for questions and acknowledgement of understanding      Vitals: (Last set prior to Anesthesia Care Transfer)    CRNA VITALS  11/26/2018 1255 - 11/26/2018 1330      11/26/2018             Pulse: 72    SpO2: 99 %                Electronically Signed By: LEIGH Hutchinson CRNA  November 26, 2018  1:30 PM

## 2018-11-26 NOTE — IP AVS SNAPSHOT
[facility] PreOP/PostOP    [address]    [facility] [address]    Phone:  [phone]    Fax:  [phone]                                       After Visit Summary   11/26/2018    Swetha Patterson    MRN: [id number]           After Visit Summary Signature Page     I have received my discharge instructions, and my questions have been answered. I have discussed any challenges I see with this plan with the nurse or doctor.    ..........................................................................................................................................  Patient/Patient Representative Signature      ..........................................................................................................................................  Patient Representative Print Name and Relationship to Patient    ..................................................               ................................................  Date                                   Time    ..........................................................................................................................................  Reviewed by Signature/Title    ...................................................              ..............................................  Date                                               Time          22EPIC Rev 08/18

## 2018-11-26 NOTE — ANESTHESIA POSTPROCEDURE EVALUATION
Patient: Swetha Patterson    Procedure(s):  COLONOSCOPY (MN)    Diagnosis:Change in bowel movements  Diagnosis Additional Information: A 3 mm polyp was found in the transverse colon. The polyp was sessile. The entire examined colon is normal.           Anesthesia Type:  MAC    Note:  Anesthesia Post Evaluation    Patient location during evaluation: PACU  Patient participation: Able to fully participate in evaluation  Level of consciousness: awake  Pain management: adequate  Airway patency: patent  Cardiovascular status: acceptable  Respiratory status: acceptable  Hydration status: acceptable  PONV: controlled     Anesthetic complications: None          Last vitals:  Vitals:    11/26/18 1109 11/26/18 1329 11/26/18 1339   BP: 132/81 (!) 128/95 119/74   Resp: 16 16 14   Temp: 97.6  F (36.4  C) 98.1  F (36.7  C)    SpO2: 93% 98% 98%         Electronically Signed By: Jones Nelson MD  November 26, 2018  1:59 PM

## 2018-11-26 NOTE — IP AVS SNAPSHOT
MRN:[id number]                      After Visit Summary   11/26/2018    Swetha Patterson    MRN: [id number]           Thank you!     Thank you for choosing [facility] for your care. Our goal is always to provide you with excellent care. Hearing back from our patients is one way we can continue to improve our services. Please take a few minutes to complete the written survey that you may receive in the mail after you visit. If you would like to speak to someone directly about your visit please contact Patient Relations at [phone]. Thank you!          Patient Information     Date Of Birth          1970        About your hospital stay     You were admitted on:  November 26, 2018 You last received care in the:  [facility] PreOP/PostOP    You were discharged on:  November 26, 2018       Who to Call     For medical emergencies, please call 911.  For non-urgent questions about your medical care, please call your primary care provider or clinic, [phone]  For questions related to your surgery, please call your surgery clinic        Attending Provider     Provider Marshall Lion MD Gastroenterology       Primary Care Provider Office Phone # Fax #    Roro Janine Chawla [phone]      Further instructions from your care team       Transderm Scop (Scopolamine) Patch    The Transderm Scop patch placed behind your ear helps to prevent nausea and vomiting associated with the use of anesthesia and certain analgesics used during or after many types surgery.  You will need to remove this patch after 3 days.  However, it may be removed sooner if you are no longer concerned about nausea or vomiting.      The most common side effects:  ?  dryness of the mouth  ?  drowsiness  ?  blurred vision  ?  dilation of pupils  ?  disorientation, memory disturbances and/or confusion  ?  dizziness  ?  restlessness  ?  hallucinations  ?  difficulty  urinating  ?  skin rash  ?  red or painful eyes    Avoid drinking alcohol while using Transderm Scop patch.  Be careful about driving or operating any machinery while using this medication since it may make you drowsy.  In the unlikely event that you experience pain in the eye, which may be accompanied by widening of the pupil, eye redness and blurred vision, remove the Transderm Scop Patch immediately and consult your doctor.    Removal of Transderm Scop Patch:  To remove the patch simply peel it off your skin.  Be sure to wash your hands and the area behind your ear thoroughly with soap and water.  The Transderm Scop Patch will continue to have some active ingredient after use.  Therefore, to avoid accidental contact or ingestion by children or pets, fold the used patch in half with the sticky side together and dispose in the trash out of reach of children and pets.    GENERAL ANESTHESIA OR SEDATION ADULT DISCHARGE INSTRUCTIONS   SPECIAL PRECAUTIONS FOR 24 HOURS AFTER SURGERY    IT IS NOT UNUSUAL TO FEEL LIGHT-HEADED OR FAINT, UP TO 24 HOURS AFTER SURGERY OR WHILE TAKING PAIN MEDICATION.  IF YOU HAVE THESE SYMPTOMS; SIT FOR A FEW MINUTES BEFORE STANDING AND HAVE SOMEONE ASSIST YOU WHEN YOU GET UP TO WALK OR USE THE BATHROOM.    YOU SHOULD REST AND RELAX FOR THE NEXT 24 HOURS AND YOU MUST MAKE ARRANGEMENTS TO HAVE SOMEONE STAY WITH YOU FOR AT LEAST 24 HOURS AFTER YOUR DISCHARGE.  AVOID HAZARDOUS AND STRENUOUS ACTIVITIES.  DO NOT MAKE IMPORTANT DECISIONS FOR 24 HOURS.    DO NOT DRIVE ANY VEHICLE OR OPERATE MECHANICAL EQUIPMENT FOR 24 HOURS FOLLOWING THE END OF YOUR SURGERY.  EVEN THOUGH YOU MAY FEEL NORMAL, YOUR REACTIONS MAY BE AFFECTED BY THE MEDICATION YOU HAVE RECEIVED.    DO NOT DRINK ALCOHOLIC BEVERAGES FOR 24 HOURS FOLLOWING YOUR SURGERY.    DRINK CLEAR LIQUIDS (APPLE JUICE, GINGER ALE, 7-UP, BROTH, ETC.).  PROGRESS TO YOUR REGULAR DIET AS YOU FEEL ABLE.    YOU MAY HAVE A DRY MOUTH, A SORE THROAT, MUSCLES  "ACHES OR TROUBLE SLEEPING.  THESE SHOULD GO AWAY AFTER 24 HOURS.    CALL YOUR DOCTOR FOR ANY OF THE FOLLOWING:  SIGNS OF INFECTION (FEVER, GROWING TENDERNESS AT THE SURGERY SITE, A LARGE AMOUNT OF DRAINAGE OR BLEEDING, SEVERE PAIN, FOUL-SMELLING DRAINAGE, REDNESS OR SWELLING.    IT HAS BEEN OVER 8 TO 10 HOURS SINCE SURGERY AND YOU ARE STILL NOT ABLE TO URINATE (PASS WATER).     COLONOSCOPY DISCHARGE INSTRUCTIONS    You may not drive, use heavy equipment or consume alcohol for 24 hours because the drugs you were given may cause dizziness, drowsiness, forgetfulness and slower reaction time.    You may resume your regular diet and medications.    If you had a biopsy or polypectomy done, do not take aspirin, aleve (naproxen) or ibuprofen products for the next 10 days.  Tylenol (acetaminophen) is safe to take.    What to watch for:    Problems rarely occur after the exam.  It is important for you to be aware of the early signs of a possible complication.  Call your doctor immediately if you notice any of the followin.  Unusual or persistent abdominal pain (pain that does not move around like a gas pain).    2.  Passing bright red blood from your rectum.    3.  Black or bloody stools.    4.  Temperature above 100.6 degrees F (37.5 degrees C)    If you fell this has become a medical emergency please call 911.    DR. YOSELYN MICHAEL M.D.   CLINIC PHONE NUMBER:  [phone]      Pending Results     Date and Time Order Name Status Description    2018 1321 Surgical pathology exam In process             Admission Information     Date & Time Provider Department Dept. Phone    2018 Yoselyn Michael MD [facility] PreOP/PostOP [phone]      Your Vitals Were     Blood Pressure Temperature Respirations Height Weight Last Period     98.1  F (36.7  C) (Temporal) 16 1.626 m (5' 4\") 91.2 kg (201 lb) 04/15/2016    Pulse Oximetry BMI (Body Mass Index)                99% 34.5 kg/m2          Care " EveryWhere ID     This is your Care EveryWhere ID. This could be used by other organizations to access your [facility] medical records  [id number]        Equal Access to Services     CHARITY MANDUJANO : Ramon Jin, amanda kendrick, nildanneka postalexandra lisbethjason, caryl frankyin hayaanadia bobgualberto chavez camryn saunders. So [facility] [phone].    ATENCIÓN: Si habla español, tiene a vargas disposición servicios gratuitos de asistencia lingüística. Llame al [phone].    We comply with applicable federal civil rights laws and [place] laws. We do not discriminate on the basis of race, color, national origin, age, disability, sex, sexual orientation, or gender identity.               Review of your medicines      CONTINUE these medicines which may have CHANGED, or have new prescriptions. If we are uncertain of the size of tablets/capsules you have at home, strength may be listed as something that might have changed.        Dose / Directions    doxycycline hyclate 100 MG tablet   Commonly known as:  VIBRA-TABS   This may have changed:    - when to take this  - reasons to take this   Used for:  Acne vulgaris, Hidradenitis suppurativa        Dose:  100 mg   Take 1 tablet (100 mg) by mouth 2 times daily   Quantity:  60 tablet   Refills:  0         CONTINUE these medicines which have NOT CHANGED        Dose / Directions    acetaminophen 325 MG tablet   Commonly known as:  TYLENOL   Used for:  S/P cervical spinal fusion        Dose:  650 mg   Take 2 tablets (650 mg) by mouth every 6 hours as needed for other (mild pain)   Quantity:  50 tablet   Refills:  0       ADVAIR DISKUS 250-50 MCG/DOSE inhaler   Generic drug:  fluticasone-salmeterol        Dose:  1 puff   Inhale 1 puff into the lungs   Refills:  0       * albuterol (2.5 MG/3ML) 0.083% neb solution   Commonly known as:  PROVENTIL   Used for:  Cough        Dose:  2.5 mg   Take 1 vial (2.5 mg) by nebulization every 6 hours as needed for shortness of breath / dyspnea or wheezing    Quantity:  25 vial   Refills:  3       * albuterol 108 (90 Base) MCG/ACT inhaler   Commonly known as:  VENTOLIN HFA   Used for:  Shortness of breath        Dose:  2 puff   Inhale 2 puffs into the lungs every 6 hours   Quantity:  3 Inhaler   Refills:  3       ALEVE PO        Dose:  440 mg   Take 440 mg by mouth daily as needed for moderate pain   Refills:  0       amLODIPine 10 MG tablet   Commonly known as:  NORVASC   Used for:  Benign essential hypertension        Dose:  10 mg   Take 1 tablet (10 mg) by mouth daily   Quantity:  90 tablet   Refills:  1       atorvastatin 80 MG tablet   Commonly known as:  LIPITOR   Used for:  Hyperlipidemia LDL goal <130        Dose:  80 mg   Take 1 tablet (80 mg) by mouth daily   Quantity:  30 tablet   Refills:  0       benzoyl peroxide 10 % topical gel        Refills:  0       budesonide-formoterol 160-4.5 MCG/ACT Inhaler   Commonly known as:  SYMBICORT   Used for:  Severe chronic obstructive pulmonary disease (H)        Dose:  2 puff   Inhale 2 puffs into the lungs 2 times daily   Quantity:  3 Inhaler   Refills:  1       cetirizine HCl 10 MG Caps        Dose:  10 mg   Take 10 mg by mouth   Refills:  0       desonide 0.05 % cream   Commonly known as:  DESOWEN        Refills:  0       DIFLUCAN PO   Indication:  takes when sympomatic from antibiotics        Take by mouth as needed   Refills:  0       estradiol 0.1 MG/GM vaginal cream   Commonly known as:  ESTRACE        Dose:  1 g   Place 1 g vaginally   Refills:  0       fluticasone 50 MCG/ACT nasal spray   Commonly known as:  FLONASE        Dose:  2 spray   Spray 2 sprays in nostril   Refills:  0       furosemide 20 MG tablet   Commonly known as:  LASIX   Used for:  Bilateral leg edema        Dose:  20 mg   Take 1 tablet (20 mg) by mouth daily as needed   Quantity:  30 tablet   Refills:  0       hydrOXYzine 50 MG tablet   Commonly known as:  ATARAX   Used for:  Anxiety        Dose:   mg   Take 1-2 tablets ( mg) by  mouth 4 times daily as needed for anxiety Increased dose. For anxiety   Quantity:  180 tablet   Refills:  0       IBUPROFEN PO        Dose:  600 mg   Take 600 mg by mouth every 6 hours as needed for moderate pain   Refills:  0       ipratropium - albuterol 0.5 mg/2.5 mg/3 mL 0.5-2.5 (3) MG/3ML neb solution   Commonly known as:  DUONEB   Used for:  Pulmonary emphysema, unspecified emphysema type (H)        Dose:  1 vial   Take 1 vial (3 mLs) by nebulization every 6 hours   Quantity:  360 mL   Refills:  0       lisinopril 20 MG tablet   Commonly known as:  PRINIVIL/ZESTRIL   Used for:  Essential hypertension with goal blood pressure less than 140/90        Dose:  20 mg   Take 1 tablet (20 mg) by mouth daily   Quantity:  90 tablet   Refills:  1       montelukast 10 MG tablet   Commonly known as:  SINGULAIR   Used for:  Dust allergy, Severe chronic obstructive pulmonary disease (H)        Dose:  10 mg   Take 1 tablet (10 mg) by mouth At Bedtime   Quantity:  30 tablet   Refills:  0       omeprazole 20 MG DR capsule   Commonly known as:  priLOSEC   Used for:  Acute gastritis, presence of bleeding unspecified, unspecified gastritis type        Dose:  20 mg   Take 1 capsule (20 mg) by mouth 2 times daily   Quantity:  180 capsule   Refills:  0       order for DME   Used for:  Chronic lung disease, Cough, Severe persistent asthma with acute exacerbation        Equipment being ordered: Nebulizer machine Length of need-lifetime   Quantity:  1 Device   Refills:  0       order for DME   Used for:  Moderate persistent asthma without complication        Equipment being ordered: Nebulizer   Quantity:  1 Units   Refills:  0       RA NICOTINE GUM 2 MG gum   Generic drug:  nicotine        Dose:  2 mg   Place 2 mg inside cheek as needed for smoking cessation   Refills:  0       ranitidine 75 MG tablet   Commonly known as:  ZANTAC        Dose:  75 mg   Take 75 mg by mouth   Refills:  0       tiotropium 18 MCG inhaled capsule   Commonly  known as:  SPIRIVA HANDIHALER        Inhale contents of one capsule daily.   Quantity:  90 capsule   Refills:  3       * VITAMIN D (CHOLECALCIFEROL) PO        Dose:  2000 Units   Take 2,000 Units by mouth daily   Refills:  0       * vitamin D 2000 units Caps   Used for:  Anxiety        Dose:  2000 Units   Take 2,000 Units by mouth daily   Quantity:  30 capsule   Refills:  3       * Notice:  This list has 4 medication(s) that are the same as other medications prescribed for you. Read the directions carefully, and ask your doctor or other care provider to review them with you.             Protect others around you: Learn how to safely use, store and throw away your medicines at www.disposemymeds.org.        ANTIBIOTIC INSTRUCTION     You've Been Prescribed an Antibiotic - Now What?  Your healthcare team thinks that you or your loved one might have an infection. Some infections can be treated with antibiotics, which are powerful, life-saving drugs. Like all medications, antibiotics have side effects and should only be used when necessary. There are some important things you should know about your antibiotic treatment.      Your healthcare team may run tests before you start taking an antibiotic.    Your team may take samples (e.g., from your blood, urine or other areas) to run tests to look for bacteria. These test can be important to determine if you need an antibiotic at all and, if you do, which antibiotic will work best.      Within a few days, your healthcare team might change or even stop your antibiotic.    Your team may start you on an antibiotic while they are working to find out what is making you sick.    Your team might change your antibiotic because test results show that a different antibiotic would be better to treat your infection.    In some cases, once your team has more information, they learn that you do not need an antibiotic at all. They may find out that you don't have an infection, or that the  antibiotic you're taking won't work against your infection. For example, an infection caused by a virus can't be treated with antibiotics. Staying on an antibiotic when you don't need it is more likely to be harmful than helpful.      You may experience side effects from your antibiotic.    Like all medications, antibiotics have side effects. Some of these can be serious.    Let you healthcare team know if you have any known allergies when you are admitted to the hospital.    One significant side effect of nearly all antibiotics is the risk of severe and sometimes deadly diarrhea caused by Clostridium difficile (C. Difficile). This occurs when a person takes antibiotics because some good germs are destroyed. Antibiotic use allows C. diificile to take over, putting patients at high risk for this serious infection.    As a patient or caregiver, it is important to understand your or your loved one's antibiotic treatment. It is especially important for caregivers to speak up when patients can't speak for themselves. Here are some important questions to ask your healthcare team.    What infection is this antibiotic treating and how do you know I have that infection?    What side effects might occur from this antibiotic?    How long will I need to take this antibiotic?    Is it safe to take this antibiotic with other medications or supplements (e.g., vitamins) that I am taking?     Are there any special directions I need to know about taking this antibiotic? For example, should I take it with food?    How will I be monitored to know whether my infection is responding to the antibiotic?    What tests may help to make sure the right antibiotic is prescribed for me?      Information provided by:  www.cdc.gov/getsmart  U.S. Department of Health and Human Services  Centers for disease Control and Prevention  National Center for Emerging and Zoonotic Infectious Diseases  Division of Healthcare Quality Promotion              Medication List: This is a list of all your medications and when to take them. Check marks below indicate your daily home schedule. Keep this list as a reference.      Medications           Morning Afternoon Evening Bedtime As Needed    acetaminophen 325 MG tablet   Commonly known as:  TYLENOL   Take 2 tablets (650 mg) by mouth every 6 hours as needed for other (mild pain)                                ADVAIR DISKUS 250-50 MCG/DOSE inhaler   Inhale 1 puff into the lungs   Generic drug:  fluticasone-salmeterol                                * albuterol (2.5 MG/3ML) 0.083% neb solution   Commonly known as:  PROVENTIL   Take 1 vial (2.5 mg) by nebulization every 6 hours as needed for shortness of breath / dyspnea or wheezing                                * albuterol 108 (90 Base) MCG/ACT inhaler   Commonly known as:  VENTOLIN HFA   Inhale 2 puffs into the lungs every 6 hours                                ALEVE PO   Take 440 mg by mouth daily as needed for moderate pain                                amLODIPine 10 MG tablet   Commonly known as:  NORVASC   Take 1 tablet (10 mg) by mouth daily                                atorvastatin 80 MG tablet   Commonly known as:  LIPITOR   Take 1 tablet (80 mg) by mouth daily                                benzoyl peroxide 10 % topical gel                                budesonide-formoterol 160-4.5 MCG/ACT Inhaler   Commonly known as:  SYMBICORT   Inhale 2 puffs into the lungs 2 times daily                                cetirizine HCl 10 MG Caps   Take 10 mg by mouth                                desonide 0.05 % cream   Commonly known as:  DESOWEN                                DIFLUCAN PO   Take by mouth as needed                                doxycycline hyclate 100 MG tablet   Commonly known as:  VIBRA-TABS   Take 1 tablet (100 mg) by mouth 2 times daily                                estradiol 0.1 MG/GM vaginal cream   Commonly known as:  ESTRACE   Place 1 g vaginally                                 fluticasone 50 MCG/ACT nasal spray   Commonly known as:  FLONASE   Spray 2 sprays in nostril                                furosemide 20 MG tablet   Commonly known as:  LASIX   Take 1 tablet (20 mg) by mouth daily as needed                                hydrOXYzine 50 MG tablet   Commonly known as:  ATARAX   Take 1-2 tablets ( mg) by mouth 4 times daily as needed for anxiety Increased dose. For anxiety                                IBUPROFEN PO   Take 600 mg by mouth every 6 hours as needed for moderate pain                                ipratropium - albuterol 0.5 mg/2.5 mg/3 mL 0.5-2.5 (3) MG/3ML neb solution   Commonly known as:  DUONEB   Take 1 vial (3 mLs) by nebulization every 6 hours                                lisinopril 20 MG tablet   Commonly known as:  PRINIVIL/ZESTRIL   Take 1 tablet (20 mg) by mouth daily                                montelukast 10 MG tablet   Commonly known as:  SINGULAIR   Take 1 tablet (10 mg) by mouth At Bedtime                                omeprazole 20 MG DR capsule   Commonly known as:  priLOSEC   Take 1 capsule (20 mg) by mouth 2 times daily                                order for DME   Equipment being ordered: Nebulizer machine Length of need-lifetime                                order for DME   Equipment being ordered: Nebulizer                                RA NICOTINE GUM 2 MG gum   Place 2 mg inside cheek as needed for smoking cessation   Generic drug:  nicotine                                ranitidine 75 MG tablet   Commonly known as:  ZANTAC   Take 75 mg by mouth                                tiotropium 18 MCG inhaled capsule   Commonly known as:  SPIRIVA HANDIHALER   Inhale contents of one capsule daily.                                * VITAMIN D (CHOLECALCIFEROL) PO   Take 2,000 Units by mouth daily                                * vitamin D 2000 units Caps   Take 2,000 Units by mouth daily                                 * Notice:  This list has 4 medication(s) that are the same as other medications prescribed for you. Read the directions carefully, and ask your doctor or other care provider to review them with you.              More Information        Diverticulosis    Diverticulosis means that small pouches have formed in the wall of your large intestine (colon). Most often, this problem causes no symptoms and is common as people age. But the pouches in the colon are at risk of becoming infected. When this happens, the condition is called diverticulitis. Although most people with diverticulosis never develop diverticulitis, it is still not uncommon. Rectal bleeding can also occur and in less common situations, a type of colon inflammation called colitis.  While most people don't have symptoms, some people with diverticulosis may have:    Abdominal cramps and pain    Bloating    Constipation    Change in bowel habits  Causes  The exact cause of diverticulosis (and diverticulitis) has not been proved, but a few things are associated with the condition:    Low-fiber diet    Constipation    Lack of exercise  Your healthcare provider will talk with you about how to manage your condition. Diet changes may be all that are needed to help control diverticulosis and prevent progression to diverticulitis. If you develop diverticulitis, you will likely need other treatments.  Home care  You may be told to take fiber supplements daily. Fiber adds bulk to the stool so that it passes through the colon more easily. Stool softeners may also be recommended. You may also be given medicines for pain relief. Be sure to take all medicines as directed.  In the past, people were told to avoid corn, nuts, and seeds. This is no longer necessary.  Follow these guidelines when caring for yourself at home:    Eat unprocessed foods that are high in fiber. Whole-grains, fruits, and vegetables are good choices.    Drink 6 to 8 glasses of water every day  unless your healthcare provider has you limit how much fluid you should have.    Watch for changes in your bowel movements. Tell your provider if you notice any changes.    Begin an exercise program. Ask your provider how to get started. Generally, walking is the best.    Get plenty of rest and sleep.  Follow-up care  Follow up with your healthcare provider, or as advised. Regular visits may be needed to check on your health. Sometimes special procedures such as colonoscopy, are needed after an episode of diverticulitis or blooding. Be sure to keep all your appointments.  If a stool sample was taken, or cultures were done, you should be told if they are positive, or if your treatment needs to be changed. You can call as directed for the results.  If X-rays were done, a radiologist will look at them. You will be told if there is a change in your treatment.  If antibiotics were prescribed, be sure to finish them all.  When to seek medical advice  Call your healthcare provider right away if any of these occur:    Fever of 100.4 F (38 C) or higher, or as directed by your healthcare provider    Severe cramps in the lower left side of the abdomen or pain that is getting worse    Tenderness in the lower left side of the abdomen or worsening pain throughout the abdomen    Diarrhea or constipation that doesn't get better within 24 hours    Nausea and vomiting    Bleeding from the rectum  Call 911  Call 911 if any of the following occur:    Trouble breathing    Confusion    Very drowsy or trouble awakening    Fainting or loss of consciousness    Rapid heart rate    Chest pain   Date Last Reviewed: 6/1/2018 2000-2018 [employer]. [address] [place] [address], [place] [address]. All rights reserved. This information is not intended as a substitute for professional medical care. Always follow your healthcare professional's instructions.

## 2018-11-27 LAB — COPATH REPORT: NORMAL

## 2018-12-13 ENCOUNTER — HOSPITAL ENCOUNTER (OUTPATIENT)
Dept: CT IMAGING | Facility: CLINIC | Age: 48
Discharge: HOME | End: 2018-12-13
Attending: INTERNAL MEDICINE | Admitting: INTERNAL MEDICINE
Payer: MEDICARE

## 2018-12-13 DIAGNOSIS — R10.13 EPIGASTRIC PAIN: ICD-10-CM

## 2018-12-13 PROCEDURE — 74177 CT ABD & PELVIS W/CONTRAST: CPT

## 2018-12-13 PROCEDURE — 25000128 H RX IP 250 OP 636: Performed by: RADIOLOGY

## 2018-12-13 RX ORDER — IOPAMIDOL 755 MG/ML
500 INJECTION, SOLUTION INTRAVASCULAR ONCE
Status: COMPLETED | OUTPATIENT
Start: 2018-12-13 | End: 2018-12-13

## 2018-12-13 RX ADMIN — SODIUM CHLORIDE 55 ML: 9 INJECTION, SOLUTION INTRAVENOUS at 14:06

## 2018-12-13 RX ADMIN — IOPAMIDOL 100 ML: 755 INJECTION, SOLUTION INTRAVENOUS at 14:05

## 2018-12-18 ENCOUNTER — TRANSFERRED RECORDS (OUTPATIENT)
Dept: HEALTH INFORMATION MANAGEMENT | Facility: CLINIC | Age: 48
End: 2018-12-18

## 2018-12-26 ENCOUNTER — APPOINTMENT (OUTPATIENT)
Dept: CT IMAGING | Facility: CLINIC | Age: 48
End: 2018-12-26
Attending: EMERGENCY MEDICINE
Payer: MEDICARE

## 2018-12-26 ENCOUNTER — APPOINTMENT (OUTPATIENT)
Dept: GENERAL RADIOLOGY | Facility: CLINIC | Age: 48
End: 2018-12-26
Attending: EMERGENCY MEDICINE
Payer: MEDICARE

## 2018-12-26 ENCOUNTER — HOSPITAL ENCOUNTER (OUTPATIENT)
Facility: CLINIC | Age: 48
Setting detail: OBSERVATION
Discharge: HOME | End: 2018-12-26
Attending: EMERGENCY MEDICINE | Admitting: INTERNAL MEDICINE
Payer: MEDICARE

## 2018-12-26 VITALS
BODY MASS INDEX: 34.84 KG/M2 | OXYGEN SATURATION: 96 % | SYSTOLIC BLOOD PRESSURE: 128 MMHG | RESPIRATION RATE: 22 BRPM | TEMPERATURE: 97.8 F | WEIGHT: 203 LBS | HEART RATE: 87 BPM | DIASTOLIC BLOOD PRESSURE: 71 MMHG

## 2018-12-26 DIAGNOSIS — J44.9 CHRONIC OBSTRUCTIVE PULMONARY DISEASE, UNSPECIFIED COPD TYPE (H): ICD-10-CM

## 2018-12-26 DIAGNOSIS — J43.8 OTHER EMPHYSEMA (H): Primary | ICD-10-CM

## 2018-12-26 LAB
ANION GAP SERPL CALCULATED.3IONS-SCNC: 10 MMOL/L (ref 3–14)
BASOPHILS # BLD AUTO: 0.1 10E9/L (ref 0–0.2)
BASOPHILS NFR BLD AUTO: 0.4 %
BUN SERPL-MCNC: 16 MG/DL (ref 7–30)
CALCIUM SERPL-MCNC: 8.7 MG/DL (ref 8.5–10.1)
CHLORIDE SERPL-SCNC: 110 MMOL/L (ref 94–109)
CO2 BLDCOV-SCNC: 23 MMOL/L (ref 21–28)
CO2 SERPL-SCNC: 22 MMOL/L (ref 20–32)
CREAT SERPL-MCNC: 0.83 MG/DL (ref 0.52–1.04)
DIFFERENTIAL METHOD BLD: ABNORMAL
EOSINOPHIL # BLD AUTO: 0 10E9/L (ref 0–0.7)
EOSINOPHIL NFR BLD AUTO: 0 %
ERYTHROCYTE [DISTWIDTH] IN BLOOD BY AUTOMATED COUNT: 14.2 % (ref 10–15)
GFR SERPL CREATININE-BSD FRML MDRD: 83 ML/MIN/{1.73_M2}
GLUCOSE SERPL-MCNC: 123 MG/DL (ref 70–99)
HCT VFR BLD AUTO: 43.3 % (ref 35–47)
HGB BLD-MCNC: 14.2 G/DL (ref 11.7–15.7)
IMM GRANULOCYTES # BLD: 0.1 10E9/L (ref 0–0.4)
IMM GRANULOCYTES NFR BLD: 0.9 %
INTERPRETATION ECG - MUSE: NORMAL
LACTATE BLD-SCNC: 2.9 MMOL/L (ref 0.7–2.1)
LACTATE SERPL-SCNC: 2.6 MMOL/L (ref 0.4–2)
LYMPHOCYTES # BLD AUTO: 4.4 10E9/L (ref 0.8–5.3)
LYMPHOCYTES NFR BLD AUTO: 39 %
MCH RBC QN AUTO: 30 PG (ref 26.5–33)
MCHC RBC AUTO-ENTMCNC: 32.8 G/DL (ref 31.5–36.5)
MCV RBC AUTO: 91 FL (ref 78–100)
MONOCYTES # BLD AUTO: 0.7 10E9/L (ref 0–1.3)
MONOCYTES NFR BLD AUTO: 6.4 %
NEUTROPHILS # BLD AUTO: 6 10E9/L (ref 1.6–8.3)
NEUTROPHILS NFR BLD AUTO: 53.3 %
NRBC # BLD AUTO: 0 10*3/UL
NRBC BLD AUTO-RTO: 0 /100
PCO2 BLDV: 38 MM HG (ref 40–50)
PH BLDV: 7.38 PH (ref 7.32–7.43)
PLATELET # BLD AUTO: 371 10E9/L (ref 150–450)
PO2 BLDV: 51 MM HG (ref 25–47)
POTASSIUM SERPL-SCNC: 3.9 MMOL/L (ref 3.4–5.3)
RBC # BLD AUTO: 4.74 10E12/L (ref 3.8–5.2)
SAO2 % BLDV FROM PO2: 85 %
SODIUM SERPL-SCNC: 142 MMOL/L (ref 133–144)
TROPONIN I SERPL-MCNC: <0.015 UG/L (ref 0–0.04)
WBC # BLD AUTO: 11.2 10E9/L (ref 4–11)

## 2018-12-26 PROCEDURE — 80048 BASIC METABOLIC PNL TOTAL CA: CPT | Performed by: EMERGENCY MEDICINE

## 2018-12-26 PROCEDURE — 84484 ASSAY OF TROPONIN QUANT: CPT | Performed by: EMERGENCY MEDICINE

## 2018-12-26 PROCEDURE — 25000132 ZZH RX MED GY IP 250 OP 250 PS 637: Mod: GY | Performed by: INTERNAL MEDICINE

## 2018-12-26 PROCEDURE — A9270 NON-COVERED ITEM OR SERVICE: HCPCS | Mod: GY | Performed by: INTERNAL MEDICINE

## 2018-12-26 PROCEDURE — 96374 THER/PROPH/DIAG INJ IV PUSH: CPT | Mod: 59

## 2018-12-26 PROCEDURE — 40000275 ZZH STATISTIC RCP TIME EA 10 MIN

## 2018-12-26 PROCEDURE — 71045 X-RAY EXAM CHEST 1 VIEW: CPT

## 2018-12-26 PROCEDURE — 25000125 ZZHC RX 250: Performed by: INTERNAL MEDICINE

## 2018-12-26 PROCEDURE — 93005 ELECTROCARDIOGRAM TRACING: CPT

## 2018-12-26 PROCEDURE — 83605 ASSAY OF LACTIC ACID: CPT | Mod: 91

## 2018-12-26 PROCEDURE — 25000125 ZZHC RX 250: Performed by: EMERGENCY MEDICINE

## 2018-12-26 PROCEDURE — 36415 COLL VENOUS BLD VENIPUNCTURE: CPT | Performed by: INTERNAL MEDICINE

## 2018-12-26 PROCEDURE — 25000132 ZZH RX MED GY IP 250 OP 250 PS 637: Mod: GY | Performed by: EMERGENCY MEDICINE

## 2018-12-26 PROCEDURE — 82803 BLOOD GASES ANY COMBINATION: CPT

## 2018-12-26 PROCEDURE — 83605 ASSAY OF LACTIC ACID: CPT | Performed by: INTERNAL MEDICINE

## 2018-12-26 PROCEDURE — 25000128 H RX IP 250 OP 636: Performed by: EMERGENCY MEDICINE

## 2018-12-26 PROCEDURE — 85025 COMPLETE CBC W/AUTO DIFF WBC: CPT | Performed by: EMERGENCY MEDICINE

## 2018-12-26 PROCEDURE — 25000128 H RX IP 250 OP 636: Performed by: INTERNAL MEDICINE

## 2018-12-26 PROCEDURE — 94640 AIRWAY INHALATION TREATMENT: CPT

## 2018-12-26 PROCEDURE — 84484 ASSAY OF TROPONIN QUANT: CPT | Mod: 91 | Performed by: INTERNAL MEDICINE

## 2018-12-26 PROCEDURE — G0378 HOSPITAL OBSERVATION PER HR: HCPCS

## 2018-12-26 PROCEDURE — 99285 EMERGENCY DEPT VISIT HI MDM: CPT | Mod: 25

## 2018-12-26 PROCEDURE — 96375 TX/PRO/DX INJ NEW DRUG ADDON: CPT | Mod: 59

## 2018-12-26 PROCEDURE — 71260 CT THORAX DX C+: CPT

## 2018-12-26 PROCEDURE — 25000131 ZZH RX MED GY IP 250 OP 636 PS 637: Mod: GY | Performed by: INTERNAL MEDICINE

## 2018-12-26 PROCEDURE — 99236 HOSP IP/OBS SAME DATE HI 85: CPT | Performed by: INTERNAL MEDICINE

## 2018-12-26 PROCEDURE — 40000274 ZZH STATISTIC RCP CONSULT EA 30 MIN

## 2018-12-26 PROCEDURE — 99207 ZZC APP CREDIT; MD BILLING SHARED VISIT: CPT | Performed by: INTERNAL MEDICINE

## 2018-12-26 PROCEDURE — A9270 NON-COVERED ITEM OR SERVICE: HCPCS | Mod: GY | Performed by: EMERGENCY MEDICINE

## 2018-12-26 PROCEDURE — 99207 ZZC CDG-CODE CATEGORY CHANGED: CPT | Performed by: INTERNAL MEDICINE

## 2018-12-26 RX ORDER — ONDANSETRON 4 MG/1
4 TABLET, ORALLY DISINTEGRATING ORAL EVERY 6 HOURS PRN
Status: DISCONTINUED | OUTPATIENT
Start: 2018-12-26 | End: 2018-12-26 | Stop reason: HOSPADM

## 2018-12-26 RX ORDER — IBUPROFEN 400 MG/1
400 TABLET, FILM COATED ORAL EVERY 6 HOURS PRN
Status: DISCONTINUED | OUTPATIENT
Start: 2018-12-26 | End: 2018-12-26 | Stop reason: HOSPADM

## 2018-12-26 RX ORDER — IOPAMIDOL 755 MG/ML
500 INJECTION, SOLUTION INTRAVASCULAR ONCE
Status: COMPLETED | OUTPATIENT
Start: 2018-12-26 | End: 2018-12-26

## 2018-12-26 RX ORDER — METHYLPREDNISOLONE SODIUM SUCCINATE 40 MG/ML
40 INJECTION, POWDER, LYOPHILIZED, FOR SOLUTION INTRAMUSCULAR; INTRAVENOUS EVERY 8 HOURS
Status: DISCONTINUED | OUTPATIENT
Start: 2018-12-26 | End: 2018-12-26

## 2018-12-26 RX ORDER — ACETAMINOPHEN 500 MG
1000 TABLET ORAL EVERY 4 HOURS PRN
Status: DISCONTINUED | OUTPATIENT
Start: 2018-12-26 | End: 2018-12-26

## 2018-12-26 RX ORDER — PREDNISONE 20 MG/1
40 TABLET ORAL DAILY
Qty: 4 TABLET | Refills: 0 | Status: SHIPPED | OUTPATIENT
Start: 2018-12-27 | End: 2019-06-11

## 2018-12-26 RX ORDER — HYDROXYZINE HYDROCHLORIDE 25 MG/1
25-50 TABLET, FILM COATED ORAL EVERY 6 HOURS PRN
Status: DISCONTINUED | OUTPATIENT
Start: 2018-12-26 | End: 2018-12-26 | Stop reason: HOSPADM

## 2018-12-26 RX ORDER — HYDROCODONE BITARTRATE AND ACETAMINOPHEN 5; 325 MG/1; MG/1
1-2 TABLET ORAL EVERY 4 HOURS PRN
Status: DISCONTINUED | OUTPATIENT
Start: 2018-12-26 | End: 2018-12-26

## 2018-12-26 RX ORDER — ONDANSETRON 2 MG/ML
4 INJECTION INTRAMUSCULAR; INTRAVENOUS ONCE
Status: COMPLETED | OUTPATIENT
Start: 2018-12-26 | End: 2018-12-26

## 2018-12-26 RX ORDER — LORAZEPAM 2 MG/ML
0.5 INJECTION INTRAMUSCULAR ONCE
Status: DISCONTINUED | OUTPATIENT
Start: 2018-12-26 | End: 2018-12-26

## 2018-12-26 RX ORDER — PREDNISONE 20 MG/1
40 TABLET ORAL DAILY
Status: DISCONTINUED | OUTPATIENT
Start: 2018-12-27 | End: 2018-12-26 | Stop reason: HOSPADM

## 2018-12-26 RX ORDER — NALOXONE HYDROCHLORIDE 0.4 MG/ML
.1-.4 INJECTION, SOLUTION INTRAMUSCULAR; INTRAVENOUS; SUBCUTANEOUS
Status: DISCONTINUED | OUTPATIENT
Start: 2018-12-26 | End: 2018-12-26 | Stop reason: HOSPADM

## 2018-12-26 RX ORDER — IPRATROPIUM BROMIDE AND ALBUTEROL SULFATE 2.5; .5 MG/3ML; MG/3ML
3 SOLUTION RESPIRATORY (INHALATION)
Status: DISCONTINUED | OUTPATIENT
Start: 2018-12-26 | End: 2018-12-26

## 2018-12-26 RX ORDER — ONDANSETRON 2 MG/ML
4 INJECTION INTRAMUSCULAR; INTRAVENOUS EVERY 6 HOURS PRN
Status: DISCONTINUED | OUTPATIENT
Start: 2018-12-26 | End: 2018-12-26 | Stop reason: HOSPADM

## 2018-12-26 RX ORDER — ACETAMINOPHEN 325 MG/1
650 TABLET ORAL EVERY 4 HOURS PRN
Status: DISCONTINUED | OUTPATIENT
Start: 2018-12-26 | End: 2018-12-26 | Stop reason: HOSPADM

## 2018-12-26 RX ORDER — ALBUTEROL SULFATE 0.83 MG/ML
2.5 SOLUTION RESPIRATORY (INHALATION)
Status: DISCONTINUED | OUTPATIENT
Start: 2018-12-26 | End: 2018-12-26 | Stop reason: HOSPADM

## 2018-12-26 RX ORDER — HYDROMORPHONE HYDROCHLORIDE 1 MG/ML
0.5 INJECTION, SOLUTION INTRAMUSCULAR; INTRAVENOUS; SUBCUTANEOUS
Status: COMPLETED | OUTPATIENT
Start: 2018-12-26 | End: 2018-12-26

## 2018-12-26 RX ORDER — ALBUTEROL SULFATE 90 UG/1
2 AEROSOL, METERED RESPIRATORY (INHALATION) EVERY 6 HOURS PRN
COMMUNITY
End: 2019-06-11

## 2018-12-26 RX ORDER — ALBUTEROL SULFATE 0.83 MG/ML
2.5 SOLUTION RESPIRATORY (INHALATION) ONCE
Status: COMPLETED | OUTPATIENT
Start: 2018-12-26 | End: 2018-12-26

## 2018-12-26 RX ORDER — ONDANSETRON 2 MG/ML
4 INJECTION INTRAMUSCULAR; INTRAVENOUS ONCE
Status: DISCONTINUED | OUTPATIENT
Start: 2018-12-26 | End: 2018-12-26

## 2018-12-26 RX ORDER — NICOTINE POLACRILEX 4 MG/1
20 GUM, CHEWING ORAL 2 TIMES DAILY
COMMUNITY
End: 2019-12-12

## 2018-12-26 RX ORDER — METHYLPREDNISOLONE SODIUM SUCCINATE 125 MG/2ML
125 INJECTION, POWDER, LYOPHILIZED, FOR SOLUTION INTRAMUSCULAR; INTRAVENOUS ONCE
Status: COMPLETED | OUTPATIENT
Start: 2018-12-26 | End: 2018-12-26

## 2018-12-26 RX ADMIN — HYDROXYZINE HYDROCHLORIDE 50 MG: 25 TABLET ORAL at 08:54

## 2018-12-26 RX ADMIN — SODIUM CHLORIDE 96 ML: 9 INJECTION, SOLUTION INTRAVENOUS at 02:02

## 2018-12-26 RX ADMIN — HYDROCODONE BITARTRATE AND ACETAMINOPHEN 2 TABLET: 5; 325 TABLET ORAL at 05:21

## 2018-12-26 RX ADMIN — FLUTICASONE FUROATE AND VILANTEROL TRIFENATATE 1 PUFF: 200; 25 POWDER RESPIRATORY (INHALATION) at 10:04

## 2018-12-26 RX ADMIN — IPRATROPIUM BROMIDE AND ALBUTEROL SULFATE 3 ML: .5; 3 SOLUTION RESPIRATORY (INHALATION) at 09:00

## 2018-12-26 RX ADMIN — METHYLPREDNISOLONE SODIUM SUCCINATE 125 MG: 125 INJECTION, POWDER, FOR SOLUTION INTRAMUSCULAR; INTRAVENOUS at 00:29

## 2018-12-26 RX ADMIN — METHYLPREDNISOLONE SODIUM SUCCINATE 40 MG: 40 INJECTION, POWDER, FOR SOLUTION INTRAMUSCULAR; INTRAVENOUS at 08:40

## 2018-12-26 RX ADMIN — ALBUTEROL SULFATE 2.5 MG: 2.5 SOLUTION RESPIRATORY (INHALATION) at 02:47

## 2018-12-26 RX ADMIN — ONDANSETRON 4 MG: 4 TABLET, ORALLY DISINTEGRATING ORAL at 10:55

## 2018-12-26 RX ADMIN — UMECLIDINIUM 1 PUFF: 62.5 AEROSOL, POWDER ORAL at 10:04

## 2018-12-26 RX ADMIN — HYDROMORPHONE HYDROCHLORIDE 0.5 MG: 1 INJECTION, SOLUTION INTRAMUSCULAR; INTRAVENOUS; SUBCUTANEOUS at 02:48

## 2018-12-26 RX ADMIN — ACETAMINOPHEN 1000 MG: 500 TABLET, FILM COATED ORAL at 01:34

## 2018-12-26 RX ADMIN — IOPAMIDOL 79 ML: 755 INJECTION, SOLUTION INTRAVENOUS at 02:02

## 2018-12-26 RX ADMIN — DICLOFENAC 2 G: 10 GEL TOPICAL at 10:55

## 2018-12-26 RX ADMIN — IBUPROFEN 400 MG: 400 TABLET ORAL at 08:54

## 2018-12-26 RX ADMIN — ONDANSETRON 4 MG: 2 INJECTION INTRAMUSCULAR; INTRAVENOUS at 02:54

## 2018-12-26 ASSESSMENT — ENCOUNTER SYMPTOMS
FEVER: 1
COUGH: 1
DIZZINESS: 1
DIAPHORESIS: 1
SHORTNESS OF BREATH: 1

## 2018-12-26 ASSESSMENT — ACTIVITIES OF DAILY LIVING (ADL)
RETIRED_COMMUNICATION: 0-->UNDERSTANDS/COMMUNICATES WITHOUT DIFFICULTY
SWALLOWING: 0-->SWALLOWS FOODS/LIQUIDS WITHOUT DIFFICULTY
TRANSFERRING: 0-->INDEPENDENT
RETIRED_EATING: 0-->INDEPENDENT
FALL_HISTORY_WITHIN_LAST_SIX_MONTHS: NO
AMBULATION: 0-->INDEPENDENT
COGNITION: 0 - NO COGNITION ISSUES REPORTED
TOILETING: 0-->INDEPENDENT
DRESS: 0-->INDEPENDENT
ADLS_ACUITY_SCORE: 13
ADLS_ACUITY_SCORE: 15
BATHING: 0-->INDEPENDENT

## 2018-12-26 NOTE — UTILIZATION REVIEW
"Admission Status; Secondary Review Determination     Under the authority of the Utilization Management Committee, the utilization review process indicated a secondary review on the above patient.  The review outcome is based on review of the medical records, discussions with staff, and applying clinical experience noted on the date of the review.       (x) Observation Status Appropriate - This patient does not meet hospital inpatient criteria and is placed in observation status. If this patient's primary payer is Medicare and was admitted as an inpatient, Condition Code 44 should be used and patient status changed to \"observation\".     RATIONALE FOR DETERMINATION: 48-year-old female with history of COPD, chronic pain, asthma, anxiety presented to the hospital with coughing for 1 week with some progressive shortness of breath unrelieved with outpatient nebulizers.  After initial ER treatment, tachypnea significantly improved and O2 saturations remained normal.  Observation care appropriate for initial COPD exacerbation management.  If patient fails to respond to this outpatient treatment, and at that time patient may advance to inpatient services.        The severity of illness, intensity of service provided, expected LOS and risk for adverse outcome make the care appropriate for further observation; however, doesn't meet criteria for hospital inpatient admission. This was discussed with attending physician who concurred with this determination.    The information on this document is developed by the utilization review team in order for the business office to ensure compliance.  This only denotes the appropriateness of proper admission status and does not reflect the quality of care rendered.         The definitions of Inpatient Status and Observation Status used in making the determination above are those provided in the CMS Coverage Manual, Chapter 1 and Chapter 6, section 70.4.      Sincerely,     Azael Peres MD  "   Physician Advisor  Utilization Review/ Case Management  [facility].

## 2018-12-26 NOTE — LETTER
Transition Communication Hand-off for Care Transitions to Next Level of Care Provider    Name: Swetha Patterson  : 1970  MRN #: [id number]  Primary Care Provider: Roro Chawla  Primary Care MD Name: Cammy  Primary Clinic: [address]  [facility] [address]  Primary Care Clinic Name: ([facility])  Reason for Hospitalization:  Chronic obstructive pulmonary disease, unspecified COPD type (H) [J44.9]  Admit Date/Time: 2018 12:09 AM  Discharge Date: 18  Payor Source: Payor: MEDICARE / Plan: MEDICARE / Product Type: Medicare /     Readmission Assessment Measure (GONZALO) Risk Score/category: None         Reason for Communication Hand-off Referral: Admission diagnoses: COPD    Discharge Plan:  home       Concern for non-adherence with plan of care:   No  Discharge Needs Assessment:  Needs      Most Recent Value   Equipment Currently Used at Home  other (see comments) [neb]          Follow-up specialty is recommended: yes, pulmonology    Follow-up plan:  No future appointments.    Any outstanding tests or procedures:              Key Recommendations:  Patient in hospital for COPD exacerbation.  Met with patient at bedside.  Patient reports that she has not seen her pulmonologist in over one year, but that she sees Dr. Williamson at the [facility].  Patient does not use O2 at home but does use a nebulizer.  She is not receiving any home services, however, she does work with a care coordinator through her insurance provider.  Patient declined any assistance with scheduling f/u appointment with PCP, states that she will do so herself when she returns home.  COPD action plan reviewed with the patient, patient verbalized understanding.     Patient to start prednisone medication.  Patient to discontinue use of advair.  Patient was discharged with albuterol inhaler/neb.  Please follow up with patient regarding resolution of COPD exacerbation and to offer assistance with scheduling with  pulmonology.          Dianne Bell    AVS/Discharge Summary is the source of truth; this is a helpful guide for improved communication of patient story

## 2018-12-26 NOTE — PROGRESS NOTES
"[facility] RCAT     Date: 12/26/18    Admission Dx: COPD Exacerbation    Pulmonary History: COPD, Asthma    Home Nebulizer/MDI Use: Albuterol, Symbicort, Spiriva    Home Oxygen: none on file    Acuity Level (RCAT flow sheet): Level 4    Aerosol Therapy initiated: Albuterol Q6 per MD order.    Pulmonary Hygiene initiated: deep breathing and good cough techniques    Volume Expansion initiated: IS    Current Oxygen Requirements: Room Air  Current SpO2: 96%    Re-evaluation date: 12/29/18    Patient Education: discuss with patient the indication, benefit and side effect of nebulizer.    See \"RT Assessments\" flow sheet for patient assessment scoring and Acuity Level Details.             "

## 2018-12-26 NOTE — PROGRESS NOTES
Discharge Planner   Discharge Plans in progress: yes  Barriers to discharge plan: patient admitted with COPD.  Patient reports that she has not seen pulmonologist in over one year.  Follow up plan: patient to discharge home today.       Entered by: Dianne Bell 12/26/2018 2:09 PM

## 2018-12-26 NOTE — PLAN OF CARE
Pt A&O x4. VS stable; afebrile. On remote telemetry. Lung sounds: wheezes throughout, improved this afternoon. Encouraged IS and deep breathing. Started on PO prednisone and albuterol. Rates pain 8-9/10 in L lower back radiating to L rib-cage. Switched from NORCO to Ibuprofen, vistaril, and voltaren gel. Pt stated she still had no relief. CMS intact. Up SBA w/ gait belt. Voiding in good amts. Tolerating regular diet-minimal appetite. Zofran given for nausea-resolved.     Pt expressed frustration that her pain was not being managed and wanted to leave if no other  pain relief method was provided other than what we were giving her-Pain team was paged to see if they could come and see pt STAT-did not hear back so Hospitalist was paged. Discharge orders were placed. Pt stated that she had to leave right away, so she signed discharge paperwork but would not allow nursing to go through discharge instructions with her, nor did she want to wait for her Prednisone prescription.     Patient discharged to home via sister w/ belongings at this time.

## 2018-12-26 NOTE — H&P
Admitted:     12/26/2018      CHIEF COMPLAINT:  Shortness of breath.      HISTORY OF PRESENT ILLNESS:  This is a 48-year-old white female with a history of fatty liver, hyperlipidemia, hypertension, COPD, depression, chronic pain, asthma, anxiety who presents with coughing, which has been going on for the past week.  It is mainly dry in nature.  She also complains of pain in her left mid back that radiates to the front, which has been going on for the past day.  It is positional in nature.  Concomitant with her symptoms she has been more short winded.  She denies any fevers or chills.  She has used nebulizers to some effect; however, not completely and hence she called an ambulance and was brought into the ER for further evaluation.  In the ER over here, she was seen by Dr. Luana Danielle.  I discussed care with her.  I am asked to admit her for further evaluation.      PAST MEDICAL HISTORY:  COPD, asthma, anxiety, depression, GERD, hyperlipidemia, fatty liver, chronic pain.      PAST SURGICAL HISTORY:  Significant for tonsillectomy, thoracoscopy carried out at [place].  Patient states this was for some precancerous cells, laparoscopic cholecystectomy, appendectomy, bronchial thermoplasty, left shoulder decompression, colonoscopy carried out recently in 11/2018.      FAMILY HISTORY:  Significant for heart disease in her mother.      SOCIAL HISTORY:  The patient continues to smoke.  She is smoking 3-4 cigarettes a day.  Prior to that was smoking half a pack a day.  She occasionally drinks alcohol.      ALLERGIES:     1.  CODEINE.     2.  FLEXERIL.    3.  HYDROCODONE.     4.  SULFA.     5.  GABAPENTIN.      HOME MEDICATIONS:  Awaiting reconciliation, but includes nebulizers, Norvasc, Lipitor, Lasix, Chantix, Spiriva.      REVIEW OF SYSTEMS:  As mentioned in the HPI.  She denies any fevers or chills.  She has been diaphoretic.  All other systems are extensively reviewed and deemed unremarkable and negative.      PHYSICAL  EXAMINATION:   VITAL SIGNS:  Temperature is 97.9, pulse 98, blood pressure is 144/83, respiratory rate 18, O2 saturation is 98% on room air.   GENERAL:  She is alert, awake, oriented, coherent, appears anxious, in no acute distress.     HEENT:  Pupils equal, round, react to light.  Pharynx, there is no exudate noted.   LUNGS:  She has expiratory wheezing bilaterally.   CARDIOVASCULAR:  Regular, S1, S2 normal.  No murmurs or gallops.   ABDOMEN:  Soft, nontender with good bowel sounds.   EXTREMITIES:  There is no edema.   SKIN:  There is no rash.   NEUROLOGIC:  Cranial nerves II-XII grossly within normal limits.  Motor:  She moves all extremities.      LABORATORY DATA:  Obtained over here included the following:  Basic metabolic panel was obtained which is grossly within normal limits.  Her lactic acid was high at 2.9.  Troponin was undetectable.  Venous blood gas showed a pH of 7.38 with a pCO2 of 38.  CBC with differential showed elevated white count of 12.2.  Her differential was grossly within normal limits.      Chest x-ray, 1 view, carried out here, showed no acute abnormality.  A CT chest, PE protocol, carried out here, showed no acute chest abnormality.  An EKG showed sinus tachycardia at 103 beats per minute.      ASSESSMENT AND PLAN:   1.  Acute exacerbation of chronic obstructive pulmonary disease.  We will admit her as an inpatient.  We will treat her with IV Solu-Medrol along with nebulizers.   2.  Chest pain, likely musculoskeletal from coughing.  Will monitor on telemetry and get serial troponins on her.   3.  Elevated lactic acid, likely due to nebulizer treatments.  Will monitor.  However, there is no infectious etiology at this point in time.  She will be admitted as an inpatient.      CODE STATUS:  Full code.         DALTON SÁNCHEZ MD             D: 2018   T: 2018   MT: AKOSUA      Name:     KIT MILLS   MRN:      [id number]        Account:      [id number]   :      1970         Admitted:     12/26/2018                   Document: [id number]

## 2018-12-26 NOTE — DISCHARGE SUMMARY
[facility]  Hospitalist Discharge Summary       Date of Admission:  12/26/2018  Date of Discharge:  12/26/2018  Discharging Provider: Gerber Thibodeaux DO      Discharge Diagnoses   1.  Acute COPD exacerbation.  Improved quite quickly during hospital stay.  She felt that her breathing had improved to the point where she could leave the same day that she had been admitted to the hospital.  I did discharge her with 2 more days of oral prednisone 40 mg a day.  Should follow-up with her primary care physician within 1 week.  2.  Chest pain.  Likely musculoskeletal by description.  Probably from coughing.  Oral pain medications with Tylenol or over-the-counter NSAIDs as needed.  3.  Lactic acidosis.  Likely due to nebulizer treatments.    Follow-ups Needed After Discharge   Follow-up with primary care physician in 1 week.    Hospital Course   Patient is a 48-year-old female with a past medical history significant for COPD.  She presented to the hospital with shortness of breath.  Diagnosed with acute COPD exacerbation.  She was started on nebulizer treatments and IV methylprednisolone.  Symptoms of shortness of breath improved fairly quickly.  She was transitioned to oral prednisone.  Allowed to discharge from the hospital the same day that she had been admitted to the hospital on 12/26/18.    Consultations This Hospital Stay   CARE COORDINATOR IP CONSULT  PAIN MANAGEMENT ADULT IP CONSULT  CARE COORDINATOR IP CONSULT    Code Status   Full Code    Time Spent on this Encounter   I spent 25 minutes with Ms. Patterson and working on discharge on 12/26/18.       Gerber Thibodeaux DO  [facility]  ______________________________________________________________________    Physical Exam   Vital Signs: Temp: 97.8  F (36.6  C) Temp src: Axillary BP: 128/71 Pulse: 87 Heart Rate: 86 Resp: 22 SpO2: 96 % O2 Device: None (Room air)    Weight: 203 lbs 0 oz  Gen:  NAD, A&Ox3.  Eyes:  PERRL, sclera  anicteric.  OP:  MMM, no lesions.  Neck:  Supple.  CV:  Regular, no murmurs.  Lung:  CTA b/l, normal effort.  Ab:  +BS, soft.  Skin:  Warm, dry to touch.  No rash.  Ext:  No pitting edema LE b/l.         Primary Care Physician   Roro Chawla    Discharge Disposition   Discharged to home  Condition at discharge: Stable    Discharge Orders   No discharge procedures on file.  Discharge Medications   Discharge Medication List as of 12/26/2018  1:52 PM      START taking these medications    Details   predniSONE (DELTASONE) 20 MG tablet Take 40 mg by mouth daily for 2 days., Disp-4 tablet, R-0, E-Prescribe         CONTINUE these medications which have NOT CHANGED    Details   albuterol (2.5 MG/3ML) 0.083% neb solution Take 1 vial (2.5 mg) by nebulization every 6 hours as needed for shortness of breath / dyspnea or wheezing, Disp-25 vial, R-3, E-Prescribe      amLODIPine (NORVASC) 10 MG tablet Take 1 tablet (10 mg) by mouth daily, Disp-90 tablet, R-1, E-Prescribe      atorvastatin (LIPITOR) 80 MG tablet Take 1 tablet (80 mg) by mouth daily, Disp-30 tablet, R-0, E-Prescribe      benzoyl peroxide 10 % topical gel Historical      budesonide-formoterol (SYMBICORT) 160-4.5 MCG/ACT Inhaler Inhale 2 puffs into the lungs 2 times daily, Disp-3 Inhaler, R-1, E-Prescribe      cetirizine HCl 10 MG CAPS Take 10 mg by mouth, Historical      !! Cholecalciferol (VITAMIN D) 2000 units CAPS Take 2,000 Units by mouth daily, Disp-30 capsule, R-3, E-Prescribe      desonide (DESOWEN) 0.05 % cream Historical      doxycycline (VIBRA-TABS) 100 MG tablet Take 1 tablet (100 mg) by mouth 2 times daily, Disp-60 tablet, R-0, E-Prescribe      Fluconazole (DIFLUCAN PO) Take by mouth as needed, Historical      fluticasone (FLONASE) 50 MCG/ACT spray Spray 2 sprays in nostril, Historical      furosemide (LASIX) 20 MG tablet Take 1 tablet (20 mg) by mouth daily as needed, Disp-30 tablet, R-0, E-Prescribe      hydrOXYzine (ATARAX) 50 MG  tablet Take 1-2 tablets ( mg) by mouth 4 times daily as needed for anxiety Increased dose. For anxiety, Disp-180 tablet, R-0, E-Prescribe      IBUPROFEN PO Take 600 mg by mouth every 6 hours as needed for moderate pain, Historical      lisinopril (PRINIVIL/ZESTRIL) 20 MG tablet Take 1 tablet (20 mg) by mouth daily, Disp-90 tablet, R-1, E-Prescribe      montelukast (SINGULAIR) 10 MG tablet Take 1 tablet (10 mg) by mouth At Bedtime, Disp-30 tablet, R-0, E-Prescribe      nicotine polacrilex (RA NICOTINE GUM) 2 MG gum Place 2 mg inside cheek as needed for smoking cessation, Historical      !! order for DME Equipment being ordered: NebulizerDisp-1 Units, R-0, Local Print      !! order for DME Equipment being ordered: Nebulizer machine  Length of [id number] Device, R-0, Fax      ranitidine (ZANTAC) 75 MG tablet Take 75 mg by mouth, Historical      tiotropium (SPIRIVA HANDIHALER) 18 MCG capsule Inhale contents of one capsule daily., Disp-90 capsule, R-3, E-Prescribe      estradiol (ESTRACE) 0.1 MG/GM cream Place 1 g vaginallyHistorical      omeprazole (PRILOSEC) 20 MG CR capsule Take 1 capsule (20 mg) by mouth 2 times daily, Disp-180 capsule, R-0, E-PrescribeIf the Omeprazole 20 mg bid is not covered, please change to Omeprazole 40 mg daily - 90 tabs      !! VITAMIN D, CHOLECALCIFEROL, PO Take 2,000 Units by mouth daily, Historical       !! - Potential duplicate medications found. Please discuss with provider.      STOP taking these medications       fluticasone-salmeterol (ADVAIR DISKUS) 250-50 MCG/DOSE diskus inhaler Comments:   Reason for Stopping:         ipratropium - albuterol 0.5 mg/2.5 mg/3 mL (DUONEB) 0.5-2.5 (3) MG/3ML neb solution Comments:   Reason for Stopping:         Naproxen Sodium (ALEVE PO) Comments:   Reason for Stopping:             Allergies   Allergies   Allergen Reactions     Codeine Nausea and Vomiting     vomiting     Cyclobenzaprine Nausea     Hydrocodone Nausea and Vomiting      Sulfa Drugs Nausea and Vomiting     Nausea     Gabapentin Rash

## 2018-12-26 NOTE — PROGRESS NOTES
Pt arrived to room 644 at around 0430, oriented to room and the call light system. Pt alert and oriented,SOB with exertion/coughs, anxious, lung sounds diminished. Frequent coughs.On tele monitor, up with SBA. Tolerasting regular diet +bowel sounds, passing flatus.Pt complains of pain when coughing  Given Norco x1 10mg.Pt has scheduled Neb tx.Will continue with plan of care.

## 2018-12-26 NOTE — PROGRESS NOTES
Transition Communication Hand-off for Care Transitions to Next Level of Care Provider    Name: Swetha Patterson  : 1970  MRN #: [id number]  Primary Care Provider: Roro Chawla  Primary Care MD Name: Cammy  Primary Clinic: [address]  [facility] [address]  Primary Care Clinic Name: ([facility])  Reason for Hospitalization:  Chronic obstructive pulmonary disease, unspecified COPD type (H) [J44.9]  Admit Date/Time: 2018 12:09 AM  Discharge Date: 18  Payor Source: Payor: MEDICARE / Plan: MEDICARE / Product Type: Medicare /     Readmission Assessment Measure (GONZALO) Risk Score/category: None         Reason for Communication Hand-off Referral: Admission diagnoses: COPD    Discharge Plan:  home       Concern for non-adherence with plan of care:   No  Discharge Needs Assessment:  Needs      Most Recent Value   Equipment Currently Used at Home  other (see comments) [neb]          Follow-up specialty is recommended: yes, pulmonology    Follow-up plan:  No future appointments.    Any outstanding tests or procedures:              Key Recommendations:  Patient in hospital for COPD exacerbation.  Met with patient at bedside.  Patient reports that she has not seen her pulmonologist in over one year, but that she sees Dr. Williamson at the [facility].  Patient does not use O2 at home but does use a nebulizer.  She is not receiving any home services, however, she does work with a care coordinator through her insurance provider.  Patient declined any assistance with scheduling f/u appointment with PCP, states that she will do so herself when she returns home.  COPD action plan reviewed with the patient, patient verbalized understanding.     Patient to start prednisone medication.  Patient to discontinue use of advair.  Patient was discharged with albuterol inhaler/neb.  Please follow up with patient regarding resolution of COPD exacerbation and to offer assistance with scheduling with  pulmonology.          Dianne Bell    AVS/Discharge Summary is the source of truth; this is a helpful guide for improved communication of patient story

## 2018-12-26 NOTE — PROVIDER NOTIFICATION
Web-based page: Pain team paged but no response yet. Pt threatening to leave AMA if we  don't add something else for pain. Any suggestions? Thanks!

## 2018-12-26 NOTE — PROGRESS NOTES
Patient seen and examined.  Chart reviewed.  Please see admission history and physical by Mima Pat MD from earlier today for details.    Of note, lungs clear this morning.  COPD exacerbation seems to be significantly improved already.  Change IV methylprednisolone to oral prednisone.  Likely able to discharge from the hospital soon.

## 2018-12-26 NOTE — ED NOTES
Pt c/o increased anxiety, and shortness of breath.  Pt states she is not sure if the anxiety is causing her shortness of breath.

## 2018-12-26 NOTE — CONSULTS
Care Transition Initial Assessment - RN        Met with: Patient.  DATA   Active Problems:    COPD (chronic obstructive pulmonary disease) (H)       Cognitive Status: awake, alert and oriented.  [facility] Name: [facility])  Primary Care MD Name: Cammy  Contact information and PCP information verified: Yes  Lives With: parent(s)                     Insurance concerns: No Insurance issues identified     ASSESSMENT  Patient currently receives the following services:  none        Identified issues/concerns regarding health management: Patient in hospital for COPD exacerbation.  Met with patient at bedside.  Patient reports that she has not seen her pulmonologist in over one year, but that [facility]  Patient does not use O2 at home but does use a nebulizer.  She is not receiving any home services, however, she does work with a care coordinator through her insurance provider.  Patient declined any assistance with scheduling f/u appointment with PCP, states that she will do so herself when she returns home.  COPD action plan reviewed with the patient, patient verbalized understanding.  OBS letter given to patient, patient denied any questions.    PLAN  Financial costs for the patient not discussed.  Patient given options and choices for discharge YES .  Patient/family is agreeable to the plan?  Yes  Patient anticipates discharging to home .        Patient anticipates needs for home equipment: No  Plan/Disposition: Home   Appointments: Patient reports that she will schedule her f/u appointments herself.      Care  (CTS) will continue to follow as needed.    Dianne Bell RN CTS  Care Transitions Team  [phone]

## 2018-12-26 NOTE — ED PROVIDER NOTES
History     Chief Complaint:  shortness of breath    HPI   Swetha Patterson is a 48 year old female  With a history of emphysema and pneumonia who presents with shortness of breath, cough, and chest pain. The patient states that for the last week she has been sick with a cough and chest pain. These symptoms have got worse in the past day and she reports the pain is mostly in her left side and she believes it feels like its coming from her lung. The patient reports that this pain is consistent with her past bouts of pneumonia. The cough produces green phlegm in the morning but is otherwise dry. She also reports dizziness, sweats, and a subjective fever. The patient reports that her first 2 neb treatments en route to the ED somewhat helped her breathing. She denies any recent travel or history of cardiac issues. The patient is not on prednisone right now.     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  DIAZ  Opioid dependence  COPD  Suicide attempt   Intentional drug overdose  Vocal cord polyp  COPD  Depressive disorder  Emphysema with chronic bronchitis  GERD  Hypertension  Hyperlipidemia  Liver disease  Pneumonia  Polyps     Past Surgical History:    Arthroscopy shoulder decompression  Bronchial thermoplasty  Appendectomy  Discectomy/spinal fusion  Excise node mediastinal   Laparoscopic cholecystectomy  Thoracoscopy  Tonsillectomy     Family History:    Heart disease: Mother, maternal grandfather  Hypertension  Cerebrovascular  disease  Depression  Gallbladder disease  Asthma  Diabetes    Social History:  Patient presents alone  Light tobacco smoker  Negative for alcohol use.  Marital Status:        Review of Systems   Constitutional: Positive for diaphoresis and fever.   Respiratory: Positive for cough and shortness of breath.    Cardiovascular: Positive for chest pain.   Neurological: Positive for dizziness.   All other systems reviewed and are negative.    Physical Exam   First Vitals:  BP: (!) 104/94  Pulse: 102  Heart Rate: 102  Temp: 97.9  F (36.6  C)  Resp: 26  Weight: 92.1 kg (203 lb)  SpO2: 98 %      Physical Exam  General: Patient is alert and interactive when I enter the room, uncomfortable appearing, anxious and tearful  Head:  The scalp, face, and head appear normal  Eyes:  Conjunctivae are normal  ENT:    The nose is normal    Pinnae are normal    External acoustic canals are normal  Neck:  Trachea midline  CV:  Pulses are normal, RRR  Resp:  Tachypnea present, bilateral expiratory wheezing, no crackles  Abdomen:      Soft, non-tender, non-distended  Musc:  Normal muscular tone    No major joint effusions    No asymmetric leg swelling    Tenderness to left lower chest to palpation   Skin:  No rash or lesions noted  Neuro:  Speech is normal and fluent. Face is symmetric.     Moving all extremities well.   Psych: Awake. Alert.  Normal affect.  Appropriate interactions.    Emergency Department Course   ECG:  Time: 0014  Vent. Rate 103 bpm. MN interval 114. QRS duration 82. QT/QTc 352/461. P-R-T axis 49 56 47. Sinus tachycardia. Otherwise normal ECG. Read time: 0019      Imaging:  Radiographic findings were communicated with the patient who voiced understanding of the findings.  X-ray Chest, portable:  No acute abnormality.  Result per radiology.    CT Chest Pulmonary Embolism with IV Contrast:   There is no pulmonary embolus evident on this slightly  limited exam. No aortic aneurysm or dissection. No acute  chest  abnormality.  per radiology.       Laboratory:  CBC: WBC: 11.2 (H), HGB: 14.2, PLT: 371  BMP: Glucose 123 (H), Chloride: 110 (H), o/w WNL (Creatinine: 0.83)  Troponin:  <0.015  ISTAT Gases venous lactate: pH: 7.38, pCO2: 38 (L), pO2: 51 (H), Lactic acid: 2.9 (H)    Interventions:  0029 - solu-Medrol 125 mg IV  0134 - Tylenol 1000 mg PO  0247 - Albuterol neb solution 2.5 mg nebulization  0248 - Dilaudid 0.5 mg IV    Emergency Department Course:  Nursing notes and vitals reviewed.  0025: I performed an exam of the patient as documented above. IV inserted and blood drawn.  Labs and imaging ordered.     0245: Findings and plan explained to the Patient who consents to admission.     0301: Discussed the patient with Dr. Pat, who will admit the patient to a medical bed for further monitoring, evaluation, and treatment.     Impression & Plan      CMS Diagnoses: The Lactic acid level is elevated due to respiratory distress, at this time there is no sign of severe sepsis or septic shock.    Medical Decision Making:  Swetha Patterson is a 48 year old female with a history of COPD and lung cancer who presents with shortness of breath and cough. Patient was quite tachypneic and wheezy on arrival. She had bilateral breath sounds. Patient was already given 2 duonebs with improvement of her breathing. Patient was given solu-Medrol, her VBG was unremarkable. Her portable chest x-ray revealed no pneumonia or pneumothorax. Given her persistent shortness of breath, with chest pain on the left, we did do a CT PE study. This revealed no pneumonia or PE. Her EKG and troponin were normal. Patient after some time started to become tachypneic again which she thought was secondary to anxiety and pain. Patient was given another neb as she was clearly tachypneic and wheezing. Patient was given a dilaudid as well for pain. Given her state, which I think is likely components of COPD and anxiety, I think the patient should be admitted to  the hospital. Patient admitted to medicine for COPD exacerbation in stable condition.     Diagnosis:    ICD-10-CM    1. Chronic obstructive pulmonary disease, unspecified COPD type (H) J44.9        I, George Rome, am serving as a scribe on 12/26/2018 at 2:43 AM to personally document services performed by Luana Danielle MD based on my observations and the provider's statements to me.       George Rome  12/26/2018   [facility] EMERGENCY DEPARTMENT       Luana Danielle MD  12/26/18 0903

## 2018-12-26 NOTE — PHARMACY-ADMISSION MEDICATION HISTORY
Admission medication history interview status for this patient is complete. See [facility] admission navigator for allergy information, prior to admission medications and immunization status.     Medication history interview source(s):Patient  Medication history resources (including written lists, pill bottles, clinic record):None    Changes made to PTA medication list:  Added: none  Deleted: apap, advair, naproxen, duplicate vitamin D  Changed: albuterol inhaler from q6h to q6h prn, cetirizine to prn (takes in the spring), added dose to fluconazole per Care Everywhere, re-entered omeprazole.    Actions taken by pharmacist (provider contacted, etc):None     Additional medication history information: pt takes montelukast in the spring.    Medication reconciliation/reorder completed by provider prior to medication history? yes    Prior to Admission medications    Medication Sig Last Dose Taking? Auth Provider   albuterol (2.5 MG/3ML) 0.083% neb solution Take 1 vial (2.5 mg) by nebulization every 6 hours as needed for shortness of breath / dyspnea or wheezing  Yes Emi Shirley APRN CNP   albuterol (PROAIR HFA/PROVENTIL HFA/VENTOLIN HFA) 108 (90 Base) MCG/ACT inhaler Inhale 2 puffs into the lungs every 6 hours as needed  Yes Reported, Patient   amLODIPine (NORVASC) 10 MG tablet Take 1 tablet (10 mg) by mouth daily 12/24/2018 Yes Cassy Miranda MD   atorvastatin (LIPITOR) 80 MG tablet Take 1 tablet (80 mg) by mouth daily 12/24/2018 Yes Carson Lafleur MD   benzoyl peroxide 10 % topical gel   Yes Reported, Patient   budesonide-formoterol (SYMBICORT) 160-4.5 MCG/ACT Inhaler Inhale 2 puffs into the lungs 2 times daily 12/24/2018 Yes Carson Lafleur MD   Cholecalciferol (VITAMIN D) 2000 units CAPS Take 2,000 Units by mouth daily 12/24/2018 Yes Roro Chawla MD   furosemide (LASIX) 20 MG tablet Take 1 tablet (20 mg) by mouth daily as needed  Yes Carson Lafleur MD   hydrOXYzine  (ATARAX) 50 MG tablet Take 1-2 tablets ( mg) by mouth 4 times daily as needed for anxiety Increased dose. For anxiety  Yes Roro Chawla MD   IBUPROFEN PO Take 600 mg by mouth every 6 hours as needed for moderate pain  Yes Reported, Patient   lisinopril (PRINIVIL/ZESTRIL) 20 MG tablet Take 1 tablet (20 mg) by mouth daily 12/24/2018 Yes Magaly Purcell APRN CNP   omeprazole 20 MG tablet Take 20 mg by mouth 2 times daily 12/24/2018 Yes Reported, Patient          ranitidine (ZANTAC) 75 MG tablet Take 75 mg by mouth daily  12/24/2018 Yes Reported, Patient   tiotropium (SPIRIVA HANDIHALER) 18 MCG capsule Inhale contents of one capsule daily. 12/24/2018 Yes Emi Shirley APRN CNP   cetirizine HCl 10 MG CAPS Take 10 mg by mouth daily as needed Takes in the spring.   Reported, Patient   desonide (DESOWEN) 0.05 % cream Apply topically as needed    Reported, Patient   doxycycline (VIBRA-TABS) 100 MG tablet Take 1 tablet (100 mg) by mouth 2 times daily  Patient taking differently: Take 100 mg by mouth 2 times daily as needed  3 weeks ago  Roro Chawla MD   Fluconazole (DIFLUCAN PO) Take 150 mg by mouth as needed    Reported, Patient   fluticasone (FLONASE) 50 MCG/ACT spray Spray 2 sprays in nostril   Reported, Patient   montelukast (SINGULAIR) 10 MG tablet Take 1 tablet (10 mg) by mouth At Bedtime in the spring time  Carson Lafleur MD   nicotine polacrilex (RA NICOTINE GUM) 2 MG gum Place 2 mg inside cheek as needed for smoking cessation   Reported, Patient   order for DME Equipment being ordered: Nebulizer   Emi Shirley APRN CNP   order for DME Equipment being ordered: Nebulizer machine  Length of need-lifetime   Roro Chawla MD

## 2018-12-26 NOTE — ED NOTES
[facility]  ED Nurse Handoff Report    Swetha Patterson is a 48 year old female   ED Chief complaint: Shortness of Breath  . ED Diagnosis:   Final diagnoses:   Chronic obstructive pulmonary disease, unspecified COPD type (H)     Allergies:   Allergies   Allergen Reactions     Codeine Nausea and Vomiting     vomiting     Cyclobenzaprine Nausea     Hydrocodone Nausea and Vomiting     Sulfa Drugs Nausea and Vomiting     Nausea     Gabapentin Rash       Code Status: Full Code  Activity level - Baseline/Home:  Independent. Activity Level - Current:   Independent. Lift room needed: No. Bariatric: No   Needed: No   Isolation: No. Infection: Not Applicable.     Vital Signs:   Vitals:    12/26/18 0115 12/26/18 0215 12/26/18 0230 12/26/18 0245   BP: 122/74 126/58 128/73 144/83   Pulse: 98 98 95 98   Resp:  19 19 18   Temp:       TempSrc:       SpO2:  93% 98% 98%   Weight:           Cardiac Rhythm:  ,      Pain level: 0-10 Pain Scale: 8  Patient confused: No. Patient Falls Risk: Yes.   Elimination Status: Has voided   Patient Report - Initial Complaint: pt with hx lung ca and copd c/o increased sob and cough over several days.. Focused Assessment: left back/rib pain   Tests Performed:   CT Chest Pulmonary Embolism w Contrast   Preliminary Result   IMPRESSION: There is no pulmonary embolus evident on this slightly   limited exam. No aortic aneurysm or dissection. No acute chest   abnormality.      XR Chest Port 1 View   Preliminary Result   IMPRESSION: No acute abnormality.        . Abnormal Results:   Labs Ordered and Resulted from Time of ED Arrival Up to the Time of Departure from the ED   CBC WITH PLATELETS DIFFERENTIAL - Abnormal; Notable for the following components:       Result Value    WBC 11.2 (*)     All other components within normal limits   BASIC METABOLIC PANEL - Abnormal; Notable for the following components:    Chloride 110 (*)     Glucose 123 (*)     All other components within normal  limits   ISTAT  GASES LACTATE TO POCT - Abnormal; Notable for the following components:    PCO2 Venous 38 (*)     PO2 Venous 51 (*)     Lactic Acid 2.9 (*)     All other components within normal limits   TROPONIN I     .   Treatments provided: nebs, pain meds  Family Comments: no present  OBS brochure/video discussed/provided to patient:  Yes  ED Medications:   Medications   acetaminophen (TYLENOL) tablet 1,000 mg (1,000 mg Oral Given 12/26/18 0134)   methylPREDNISolone sodium succinate (solu-MEDROL) injection 125 mg (125 mg Intravenous Given 12/26/18 0029)   0.9% sodium chloride BOLUS (0 mLs Intravenous Stopped 12/26/18 0213)   iopamidol (ISOVUE-370) solution 500 mL (79 mLs Intravenous Given 12/26/18 0202)   albuterol (PROVENTIL) neb solution 2.5 mg (2.5 mg Nebulization Given 12/26/18 0247)   HYDROmorphone (PF) (DILAUDID) injection 0.5 mg (0.5 mg Intravenous Given 12/26/18 0248)   ondansetron (ZOFRAN) injection 4 mg (4 mg Intravenous Given 12/26/18 0254)     Drips infusing:  No  For the majority of the shift, the patient's behavior Green. Interventions performed were nebs, pain meds, zofran.     Severe Sepsis OR Septic Shock Diagnosis Present: No      ED Nurse Name/Phone Number: Agustin Whalen,   3:01 AM  RECEIVING UNIT ED HANDOFF REVIEW    Above ED Nurse Handoff Report was reviewed: YES  Reviewed by: Douglas Martinez on December 26, 2018 at 3:40 AM

## 2018-12-26 NOTE — PROVIDER NOTIFICATION
Web-based page: Pt complaining of increased pain. NORCO not helping. Anything else we can try/add? Thanks!

## 2018-12-27 ENCOUNTER — PATIENT OUTREACH (OUTPATIENT)
Dept: CARE COORDINATION | Facility: CLINIC | Age: 48
End: 2018-12-27

## 2018-12-27 ENCOUNTER — TELEPHONE (OUTPATIENT)
Dept: GASTROENTEROLOGY | Facility: CLINIC | Age: 48
End: 2018-12-27
Payer: MEDICARE

## 2018-12-27 ENCOUNTER — TELEPHONE (OUTPATIENT)
Dept: INTERNAL MEDICINE | Facility: CLINIC | Age: 48
End: 2018-12-27

## 2018-12-27 DIAGNOSIS — F41.9 ANXIETY: ICD-10-CM

## 2018-12-27 NOTE — TELEPHONE ENCOUNTER
Reason for Call:  Other prescription    Detailed comments: Pt wants to discuss her hydroxyzine.  Pt would not tell me any more info.  Pt also said she is on 2 new inhalers after an ER visit and wants to talk about that as well.    Phone Number Patient can be reached at: Home number on file [phone] (home)    Best Time: any    Can we leave a detailed message on this number? YES    Call taken on 12/27/2018 at 12:02 PM by Roseanne Mooney

## 2018-12-27 NOTE — TELEPHONE ENCOUNTER
Health Call Center    Phone Message    May a detailed message be left on voicemail: yes    Reason for Call: patient called in as she has a referral for a 2nd opinion for GI as she had an exploratory colonoscopy they went 20 some odd feet to check everything out and found nothing, patient states she is in a horrible amount of pain and can barely eat due to the pain. All records are in [facility], patient wanted to know if we could review these and possibly be seen as second opinion. Please let her know if this can be done??     Action Taken: Message routed to:  [facility] ([facility] GASTRO

## 2018-12-27 NOTE — PROGRESS NOTES
Clinic Care Coordination Contact    Clinic Care Coordination Contact  OUTREACH    Referral Information:  Referral Source: IP Handoff     Chart reviewed.  Patient admitted [facility] on 12/26.  Patient discharged same day, stating she was feeling better.  Spoke with patient who states she is not feeling well today.  She is audibly wheezy with non-productive cough.  Patient states she is feeling shortness of breath still.  She is taking her prednisone, inhalers and nebs, but the do not seem to be helping.  Patient states she will likely be going back to ED today.    Patient verified she is till a patient here at [facility] and follows with Dr. Low.  Patient has not been seen in clinic since August 2018.  Patient states she hasn't been sick, so no need to see PCP.  Patient does have hx of non compliance and no shows.  Patient states she does plan to f/u with Maranda soon.    RN CC noted in chart patient is followed by Emilia MONTOYA.  Spoke with Cherelle and gave her up date on patient status.  Cherelle states she has had difficulty contacting patient, but will try again to f/u with her today.  Writer explained to Cherelle patient sx have worsened and patient may be going back to ED.    Chief Complaint   Patient presents with     Clinic Care Coordination - Post Hospital     [facility] 12/26        Universal Utilization: No show concerns.  No PCP follow up.  Compliance issues.  Clinic Utilization  Difficulty keeping appointments:: Yes  Compliance Concerns: Yes  No-Show Concerns: Yes  No PCP office visit in Past Year: No  Utilization    Last refreshed: 12/27/2018  7:36 AM:  Hospital Admissions 2           Last refreshed: 12/27/2018  7:36 AM:  ED Visits 2           Last refreshed: 12/27/2018  7:36 AM:  No Show Count (past year) 9              Current as of: 12/27/2018  7:36 AM            PLAN:  Patient encouraged to seek treatment in ED if symptoms worsen or worsening shortness of breath.  Per verbalized understanding.  Patient  encouraged to follow up closely with PCP and pulmonologist.      RN CC will do no further outreaches as [place] CM is involved and will follow up.    Sho Thomason RN-BSN   Care Coordination  Phone:  [phone]  Email: [email or web address]

## 2018-12-28 ENCOUNTER — DOCUMENTATION ONLY (OUTPATIENT)
Dept: GASTROENTEROLOGY | Facility: CLINIC | Age: 48
End: 2018-12-28

## 2018-12-28 RX ORDER — HYDROXYZINE HYDROCHLORIDE 50 MG/1
50-100 TABLET, FILM COATED ORAL 4 TIMES DAILY PRN
Qty: 180 TABLET | Refills: 0 | Status: SHIPPED | OUTPATIENT
Start: 2018-12-28 | End: 2019-03-15

## 2018-12-28 NOTE — PROGRESS NOTES
GI notes or primary provider notes related to GI problem:   [facility] note - Media Tab, PCP note - internal     Pathology reports: Y - Internal    Recent Lab  Reports: Y - Internal    Radiology Reports (CT?MRI) : Y - Internal    Endoscopy:  Y - Internal    Colonoscopy: Y - Internal    Referring GI Physician Name: N/A    Referring PCP Name: N/A        Referral Date: 12/27/18 - Self Referral - Second Opinion  - Abdominal pain    Date Complete Records Received and sent for review: 12/28/18    Date records scanned into epic: 12/28/18    Provider Review Date:     Date review routed back to sender:     Letter sent:       Notes:

## 2018-12-28 NOTE — TELEPHONE ENCOUNTER
Patient calling back.  She would like a refill of her hyrdoxyzine as she has been anxious recently with her symptoms.    Also was confused about her inhalers.  Reviewed discharge instructions and advised that she should be taking Symbicort, Spiriva and Albuterol (as needed).  She did not take an increased dose of prednisone (40 mg) for 2 days as recommended at discharge.  Advised to try this as well since she continues to have problems.  Advised follow up appointment with provider.  If symptoms continue to worsen then advised appointment.  Sarah Bowman RN

## 2018-12-31 NOTE — PROGRESS NOTES
OUTSIDE REFERRAL REVIEW FORM - [facility] LUMINAL GI/IBD CLINIC    Reason for referral: Abd pain    Date records reviewed by MD: 12/31/18    Previous work up: Yes  - Labs  - EGD  - Colonoscopy  - GI Evaluation ([facility], as recently as Nov/Dec 2018)    RECOMMENDATION    Do not schedule -- Send letter to patient with other list of GI providers, CC to PCP.    Date patient was contacted regarding scheduling decision:  Routed to  today.    Additional Comments:  Appropriate care plan as recently as 2 weeks prior to this review, recommend continuing f/u w/ [facility] for continuity of care.

## 2019-01-02 ENCOUNTER — APPOINTMENT (OUTPATIENT)
Dept: GENERAL RADIOLOGY | Facility: CLINIC | Age: 49
End: 2019-01-02
Payer: MEDICARE

## 2019-01-02 ENCOUNTER — HOSPITAL ENCOUNTER (EMERGENCY)
Facility: CLINIC | Age: 49
Discharge: HOME | End: 2019-01-02
Attending: EMERGENCY MEDICINE | Admitting: EMERGENCY MEDICINE
Payer: MEDICARE

## 2019-01-02 ENCOUNTER — TELEPHONE (OUTPATIENT)
Dept: CARDIOLOGY | Facility: CLINIC | Age: 49
End: 2019-01-02

## 2019-01-02 VITALS
RESPIRATION RATE: 18 BRPM | OXYGEN SATURATION: 94 % | TEMPERATURE: 97.2 F | DIASTOLIC BLOOD PRESSURE: 76 MMHG | SYSTOLIC BLOOD PRESSURE: 112 MMHG | HEART RATE: 95 BPM

## 2019-01-02 DIAGNOSIS — J44.1 COPD EXACERBATION (H): ICD-10-CM

## 2019-01-02 LAB
ALBUMIN SERPL-MCNC: 3.6 G/DL (ref 3.4–5)
ALP SERPL-CCNC: 149 U/L (ref 40–150)
ALT SERPL W P-5'-P-CCNC: 40 U/L (ref 0–50)
ANION GAP SERPL CALCULATED.3IONS-SCNC: 9 MMOL/L (ref 3–14)
AST SERPL W P-5'-P-CCNC: 22 U/L (ref 0–45)
BASOPHILS # BLD AUTO: 0 10E9/L (ref 0–0.2)
BASOPHILS NFR BLD AUTO: 0 %
BILIRUB SERPL-MCNC: 0.1 MG/DL (ref 0.2–1.3)
BUN SERPL-MCNC: 14 MG/DL (ref 7–30)
CALCIUM SERPL-MCNC: 9.4 MG/DL (ref 8.5–10.1)
CHLORIDE SERPL-SCNC: 107 MMOL/L (ref 94–109)
CO2 BLDCOV-SCNC: 26 MMOL/L (ref 21–28)
CO2 SERPL-SCNC: 25 MMOL/L (ref 20–32)
CREAT SERPL-MCNC: 0.86 MG/DL (ref 0.52–1.04)
DIFFERENTIAL METHOD BLD: ABNORMAL
EOSINOPHIL # BLD AUTO: 0 10E9/L (ref 0–0.7)
EOSINOPHIL NFR BLD AUTO: 0 %
ERYTHROCYTE [DISTWIDTH] IN BLOOD BY AUTOMATED COUNT: 14.9 % (ref 10–15)
FLUAV+FLUBV AG SPEC QL: NEGATIVE
FLUAV+FLUBV AG SPEC QL: NEGATIVE
GFR SERPL CREATININE-BSD FRML MDRD: 79 ML/MIN/{1.73_M2}
GLUCOSE SERPL-MCNC: 101 MG/DL (ref 70–99)
HCT VFR BLD AUTO: 48.3 % (ref 35–47)
HGB BLD-MCNC: 15.7 G/DL (ref 11.7–15.7)
LACTATE BLD-SCNC: 2 MMOL/L (ref 0.7–2.1)
LYMPHOCYTES # BLD AUTO: 5.9 10E9/L (ref 0.8–5.3)
LYMPHOCYTES NFR BLD AUTO: 46 %
MCH RBC QN AUTO: 29.6 PG (ref 26.5–33)
MCHC RBC AUTO-ENTMCNC: 32.5 G/DL (ref 31.5–36.5)
MCV RBC AUTO: 91 FL (ref 78–100)
METAMYELOCYTES # BLD: 0.1 10E9/L
METAMYELOCYTES NFR BLD MANUAL: 1 %
MONOCYTES # BLD AUTO: 0.3 10E9/L (ref 0–1.3)
MONOCYTES NFR BLD AUTO: 2 %
NEUTROPHILS # BLD AUTO: 6.5 10E9/L (ref 1.6–8.3)
NEUTROPHILS NFR BLD AUTO: 51 %
PCO2 BLDV: 48 MM HG (ref 40–50)
PH BLDV: 7.34 PH (ref 7.32–7.43)
PLATELET # BLD AUTO: 440 10E9/L (ref 150–450)
PLATELET # BLD EST: ABNORMAL 10*3/UL
PO2 BLDV: 40 MM HG (ref 25–47)
POTASSIUM SERPL-SCNC: 3.5 MMOL/L (ref 3.4–5.3)
PROT SERPL-MCNC: 8.4 G/DL (ref 6.8–8.8)
RBC # BLD AUTO: 5.3 10E12/L (ref 3.8–5.2)
RBC MORPH BLD: ABNORMAL
SAO2 % BLDV FROM PO2: 71 %
SODIUM SERPL-SCNC: 141 MMOL/L (ref 133–144)
SPECIMEN SOURCE: NORMAL
TROPONIN I SERPL-MCNC: <0.015 UG/L (ref 0–0.04)
WBC # BLD AUTO: 12.8 10E9/L (ref 4–11)

## 2019-01-02 PROCEDURE — 85025 COMPLETE CBC W/AUTO DIFF WBC: CPT | Performed by: FAMILY MEDICINE

## 2019-01-02 PROCEDURE — 80053 COMPREHEN METABOLIC PANEL: CPT | Performed by: FAMILY MEDICINE

## 2019-01-02 PROCEDURE — 82803 BLOOD GASES ANY COMBINATION: CPT

## 2019-01-02 PROCEDURE — A9270 NON-COVERED ITEM OR SERVICE: HCPCS | Mod: GY | Performed by: FAMILY MEDICINE

## 2019-01-02 PROCEDURE — 84484 ASSAY OF TROPONIN QUANT: CPT | Performed by: FAMILY MEDICINE

## 2019-01-02 PROCEDURE — 99285 EMERGENCY DEPT VISIT HI MDM: CPT | Mod: 25

## 2019-01-02 PROCEDURE — 94640 AIRWAY INHALATION TREATMENT: CPT

## 2019-01-02 PROCEDURE — 25000132 ZZH RX MED GY IP 250 OP 250 PS 637: Mod: GY | Performed by: FAMILY MEDICINE

## 2019-01-02 PROCEDURE — 25000125 ZZHC RX 250: Performed by: FAMILY MEDICINE

## 2019-01-02 PROCEDURE — 71046 X-RAY EXAM CHEST 2 VIEWS: CPT

## 2019-01-02 PROCEDURE — 87040 BLOOD CULTURE FOR BACTERIA: CPT | Mod: 91 | Performed by: EMERGENCY MEDICINE

## 2019-01-02 PROCEDURE — 25000128 H RX IP 250 OP 636: Performed by: FAMILY MEDICINE

## 2019-01-02 PROCEDURE — 96374 THER/PROPH/DIAG INJ IV PUSH: CPT

## 2019-01-02 PROCEDURE — 93005 ELECTROCARDIOGRAM TRACING: CPT

## 2019-01-02 PROCEDURE — 87040 BLOOD CULTURE FOR BACTERIA: CPT | Performed by: FAMILY MEDICINE

## 2019-01-02 PROCEDURE — 87804 INFLUENZA ASSAY W/OPTIC: CPT | Performed by: FAMILY MEDICINE

## 2019-01-02 PROCEDURE — 83605 ASSAY OF LACTIC ACID: CPT

## 2019-01-02 RX ORDER — GUAIFENESIN/DEXTROMETHORPHAN 100-10MG/5
15 SYRUP ORAL ONCE
Status: COMPLETED | OUTPATIENT
Start: 2019-01-02 | End: 2019-01-02

## 2019-01-02 RX ORDER — METHYLPREDNISOLONE SODIUM SUCCINATE 125 MG/2ML
125 INJECTION, POWDER, LYOPHILIZED, FOR SOLUTION INTRAMUSCULAR; INTRAVENOUS ONCE
Status: COMPLETED | OUTPATIENT
Start: 2019-01-02 | End: 2019-01-02

## 2019-01-02 RX ORDER — PREDNISONE 20 MG/1
TABLET ORAL
Qty: 10 TABLET | Refills: 0 | Status: SHIPPED | OUTPATIENT
Start: 2019-01-02 | End: 2019-06-11

## 2019-01-02 RX ORDER — NAPROXEN 250 MG/1
250 TABLET ORAL ONCE
Status: COMPLETED | OUTPATIENT
Start: 2019-01-02 | End: 2019-01-02

## 2019-01-02 RX ORDER — AZITHROMYCIN 250 MG/1
500 TABLET, FILM COATED ORAL ONCE
Status: COMPLETED | OUTPATIENT
Start: 2019-01-02 | End: 2019-01-02

## 2019-01-02 RX ORDER — AZITHROMYCIN 500 MG/1
500 TABLET, FILM COATED ORAL DAILY
Qty: 7 TABLET | Refills: 0 | Status: SHIPPED | OUTPATIENT
Start: 2019-01-02 | End: 2019-06-11

## 2019-01-02 RX ORDER — IPRATROPIUM BROMIDE AND ALBUTEROL SULFATE 2.5; .5 MG/3ML; MG/3ML
3 SOLUTION RESPIRATORY (INHALATION) ONCE
Status: COMPLETED | OUTPATIENT
Start: 2019-01-02 | End: 2019-01-02

## 2019-01-02 RX ORDER — BENZONATATE 100 MG/1
100 CAPSULE ORAL ONCE
Status: COMPLETED | OUTPATIENT
Start: 2019-01-02 | End: 2019-01-02

## 2019-01-02 RX ADMIN — IPRATROPIUM BROMIDE AND ALBUTEROL SULFATE 3 ML: .5; 3 SOLUTION RESPIRATORY (INHALATION) at 19:10

## 2019-01-02 RX ADMIN — METHYLPREDNISOLONE SODIUM SUCCINATE 125 MG: 125 INJECTION, POWDER, FOR SOLUTION INTRAMUSCULAR; INTRAVENOUS at 19:11

## 2019-01-02 RX ADMIN — NAPROXEN 250 MG: 250 TABLET ORAL at 20:38

## 2019-01-02 RX ADMIN — AZITHROMYCIN 500 MG: 250 TABLET, FILM COATED ORAL at 20:21

## 2019-01-02 RX ADMIN — BENZONATATE 100 MG: 100 CAPSULE ORAL at 20:32

## 2019-01-02 RX ADMIN — GUAIFENESIN AND DEXTROMETHORPHAN 15 ML: 100; 10 SYRUP ORAL at 20:32

## 2019-01-02 ASSESSMENT — ENCOUNTER SYMPTOMS
COUGH: 1
NAUSEA: 0
CHILLS: 1
FATIGUE: 1
MYALGIAS: 1
CONSTIPATION: 0
SHORTNESS OF BREATH: 1
DYSURIA: 0
HEADACHES: 0
BACK PAIN: 1
DIARRHEA: 0
DIFFICULTY URINATING: 0
ABDOMINAL PAIN: 0
ABDOMINAL DISTENTION: 0
WHEEZING: 1
SINUS PRESSURE: 0
SORE THROAT: 0
FEVER: 1
CHEST TIGHTNESS: 1
FREQUENCY: 0
VOMITING: 0
SINUS PAIN: 0

## 2019-01-02 NOTE — TELEPHONE ENCOUNTER
"Call transferred from scheduling to assess symptoms. Patient states she recently was in [facility] because she was \"really sick\" and wants to see Dr. Weeks due to her heart rate being 110-116 and is having shortness of breath. In reviewing her discharge summary, she was admitted 12/26/18 and discharged the same day. Advised her that it stated to see her PMD within 7-10 days for hospital F/U. Informed that she has not been seen in clinic since 2015, she is considered a NEW patient, and if needed, her primary clinic can refer her to be seen sooner. Her diagnosis was COPD associated and sent home with prednisone. She states she tried to get in with Dr. Chawla, but is scheduled out one month or more.  Advised she see another provider who would be available to see her for hospital F/U and evaluate her symptoms. She states \"I am out of meds too\". Advised her medications can be refilled at that time also, but she has to be seen in clinic. Advised if she feels she cannot wait, and breathing is worsening along with heart rate to go to ED for evaluation. She verbalized \"OK\" and hung up.  "

## 2019-01-02 NOTE — ED AVS SNAPSHOT
[facility] Emergency Department  [address]  [facility] [address]  Phone:  [phone]  Fax:  [phone]                                    Swetha Patterson   MRN: [id number]    Department:  [facility] Emergency Department   Date of Visit:  1/2/2019           After Visit Summary Signature Page    I have received my discharge instructions, and my questions have been answered. I have discussed any challenges I see with this plan with the nurse or doctor.    ..........................................................................................................................................  Patient/Patient Representative Signature      ..........................................................................................................................................  Patient Representative Print Name and Relationship to Patient    ..................................................               ................................................  Date                                   Time    ..........................................................................................................................................  Reviewed by Signature/Title    ...................................................              ..............................................  Date                                               Time          22EPIC Rev 08/18

## 2019-01-03 LAB — INTERPRETATION ECG - MUSE: NORMAL

## 2019-01-03 NOTE — ED TRIAGE NOTES
"Pt here with c/o cough for past 5 days. States she was admitted on 12/25 for her cough but not sure what her dx was. Pt states her cough isn't getting better and is \"tired of cough drops\". Sating 97 % on RA. ABC intact.   "

## 2019-01-03 NOTE — ED PROVIDER NOTES
Emergency Department Attending Supervision Note  1/2/2019  6:32 PM      I evaluated this patient in conjunction with Srini Estrada MD Resident      Briefly, the patient presented with a cough. The patient has a history of COPD and emphysema. She was admitted overnight 12/26 for this cough, chest pain, and COPD exacerbation. She was discharged with prednisone and finished her prescription 2 days ago. She has also been using albuterol nebs at home. She has felt no improvement since discharge, noting her symptoms to have consistently been staying the same. She reports continued shortness of breath and coughing along with a new onset of right sided chest and back pain with coughing 2 days ago. Since discharge, she has also developed generalized body aches, diaphoresis, and green/yellow mucous production in the morning. The patient is feeling dehydrated as well. She denies any fevers, chills, nausea, vomiting, abdominal pain, lightheadedness, or any other symptoms. Patient did receive both flu and pneumonia shots this year.    On my exam,   General: Anxious.  HEENT:   The scalp and head appear normal    The pupils are equal, round, and reactive to light    Extraocular muscles are intact.    The nose is normal.    The oropharynx is normal.      Uvula is in the midline.    Neck:  Normal range of motion.    Lungs:  Clear.      No rales, no wheezing.      There is no tachypnea.  Non-labored.  Cardiac: Regular rate.      Normal S1 and S2.      No pathological murmur/rub    Abdomen: Soft. No distension, no localized tenderness or rebound.  MS:  Normal tone. Normal movement of all extremities.   Neurologic:     Normal mentation.  No cranial nerve deficits.  No focal motor or sensory                            changes.      Speech normal.  Psych:  Awake.     Alert.      Normal affect.      Appropriate interactions.  Skin:  No rash.      No lesions.    Results:    ED course:    My impression is COPD  exacerbation        Diagnosis:    COPD exacerbation      I, Janette Boston, am serving as a scribe at 6:32 PM on 1/2/2019 to document services personally performed by Koffi Méndez MD based on my observations and the provider's statements to me.      Koffi Méndez MD  01/23/19 4009

## 2019-01-03 NOTE — ED PROVIDER NOTES
History     Chief Complaint:  Cough    HPI   Swetha Patterson is a 48 year old female with a history of COPD and emphysema who presents to the ED for evaluation of a cough. She was admitted overnight 12/26 for this cough, chest pain, and COPD exacerbation. She was discharged with prednisone and finished her prescription 2 days ago. She has also been using albuterol nebs at home. She has felt no improvement since discharge, noting her symptoms to have consistently been staying the same. She reports continued shortness of breath and coughing along with a new onset of right sided chest and back pain with coughing 2 days ago. Since discharge, she has also developed generalized body aches, diaphoresis, and green/yellow mucous production in the morning. The patient is feeling dehydrated as well. She denies any fevers, chills, nausea, vomiting, abdominal pain, lightheadedness, or any other symptoms. Patient did receive both flu and pneumonia shots this year. She denies being admitted or intubated in the past.    Allergies:  Codeine  Cyclobenzaprine  Hydrocodone  Sulfa drugs  Gabapentin     Medications:    albuterol (2.5 MG/3ML) 0.083% neb solution  albuterol (PROAIR HFA/PROVENTIL HFA/VENTOLIN HFA) 108 (90 Base) MCG/ACT inhaler  amLODIPine (NORVASC) 10 MG tablet  atorvastatin (LIPITOR) 80 MG tablet  benzoyl peroxide 10 % topical gel  budesonide-formoterol (SYMBICORT) 160-4.5 MCG/ACT Inhaler  cetirizine HCl 10 MG CAPS  Cholecalciferol (VITAMIN D) 2000 units CAPS  desonide (DESOWEN) 0.05 % cream  doxycycline (VIBRA-TABS) 100 MG tablet  Fluconazole (DIFLUCAN PO)  fluticasone (FLONASE) 50 MCG/ACT spray  furosemide (LASIX) 20 MG tablet  hydrOXYzine (ATARAX) 50 MG tablet  IBUPROFEN PO  lisinopril (PRINIVIL/ZESTRIL) 20 MG tablet  montelukast (SINGULAIR) 10 MG tablet  nicotine polacrilex (RA NICOTINE GUM) 2 MG gum  omeprazole 20 MG tablet  ranitidine (ZANTAC) 75 MG tablet  tiotropium (SPIRIVA HANDIHALER) 18 MCG capsule     Past  Medical History:    Anxiety state  Asthma   Contact dermatitis   COPD   Depressive disorder  Emphysema with chronic bronchitis   Gastro-oesophageal reflux disease   Hoarseness   Hypertension  Hyperlipidemia   Liver disease   Chronic pain   Polyp of vocal cord or larynx   PONV     Past Surgical History:    Arthroscopy shoulder decompression, left  Bronchial thermoplasty x3  Appendectomy  Discectomy, fusion cervical anterior one level, combined  Excise node mediastinal  Laparoscopic cholecystectomy  Thoracoscopy  Tonsillectomy     Family History:    Heart disease  Hypertension  Cerebrovascular disease  Depression  Gallbladder disease  Asthma    Social History:  Smoking status: Yes  Alcohol use: No  Marital Status:       Review of Systems   Constitutional: Positive for chills, fatigue and fever.   HENT: Negative for congestion, sinus pressure, sinus pain and sore throat.    Eyes: Negative for visual disturbance.   Respiratory: Positive for cough, chest tightness, shortness of breath and wheezing.    Cardiovascular: Positive for chest pain.   Gastrointestinal: Negative for abdominal distention, abdominal pain, constipation, diarrhea, nausea and vomiting.   Genitourinary: Negative for difficulty urinating, dysuria and frequency.   Musculoskeletal: Positive for back pain and myalgias.   Skin: Negative for rash.   Neurological: Negative for headaches.       Physical Exam     Patient Vitals for the past 24 hrs:   BP Temp Temp src Pulse Heart Rate Resp SpO2   01/02/19 2045 112/76 -- -- 95 -- -- 94 %   01/02/19 2030 106/85 -- -- 103 -- -- 91 %   01/02/19 2015 (!) 139/97 -- -- 118 -- -- 92 %   01/02/19 2004 -- -- -- 98 -- -- --   01/02/19 2000 115/75 -- -- 105 -- -- 94 %   01/02/19 1807 (!) 141/97 97.2  F (36.2  C) Temporal -- 116 18 98 %     Physical Exam   Constitutional: She appears well-developed and well-nourished. She appears distressed (anxious and uncomfortable appearing).   HENT:   Head: Normocephalic and  atraumatic.   Right Ear: External ear normal.   Left Ear: External ear normal.   Nose: Nose normal.   Mouth/Throat: Oropharynx is clear and moist.   Eyes: Right eye exhibits no discharge. Left eye exhibits no discharge.   Neck: Normal range of motion. No JVD present.   Cardiovascular: Regular rhythm and normal heart sounds. Exam reveals no gallop and no friction rub.   No murmur heard.  Tachycardic   Pulmonary/Chest: No stridor. No respiratory distress. She has wheezes (diffuse). She has no rales. She exhibits tenderness (right sided).   Wheezes resolved after neb   Abdominal: Soft. She exhibits no distension. There is no tenderness. There is no rebound and no guarding.   Neurological: She is alert.   Skin: Skin is warm and dry. She is not diaphoretic.     Emergency Department Course   ECG (19:11:14):  Rate 107 bpm. WI interval 118. QRS duration 88. QT/QTc 340/453. P-R-T axes 65 73 72. Sinus tachycardia. Otherwise normal ECG. Interpreted by Koffi Méndez W MD    Imaging:  Radiographic findings were communicated with the patient who voiced understanding of the findings.    X-ray Chest, 2 views:  IMPRESSION: Two views of the chest are performed. Lungs are clear. No  pneumothorax or pleural effusion. Heart is normal in size. Cervical  fusion plate is noted and unchanged.  Result per radiology.     Laboratory:  Lactic Acid: 2.0  Influenza A/B: negative   Blood Culture x2: Pending  CMP: Glucose 101 (H), Bilirubin 0.1 (L) o/w WNL (Creatinine 0.86)  CBC: WBC 12.8 (H) o/w WNL (HGB 15.7, )  1902 - Troponin: <0.015  1901 - ISTAT Lactate: pH 7.34, pCO2 48, pO2 40, Bicarbonate 26, Lactic Acid 2.0    Interventions:  1910: Duoneb 3 mL Nebulization  1911: Solu-medrol 125 mg IV injection  2021: Zithromax 500 mg tablet PO  2032: Tessalon 100 mg capsule PO  2032: Robitussin DM 15 mL PO  2038: Naproxen 250 mg tablet PO    Emergency Department Course:  1904 patient seen and  Evaluated, initial orders placed   2021 patient  reevaluated, improved after neb awaiting abx   2031: I rechecked the patient. Findings and plan explained to the Patient. Patient discharged home with instructions regarding supportive care, medications, and reasons to return. The importance of close follow-up was reviewed.     Impression & Plan      Medical Decision Making:  Patient is a 48-year-old female with history of COPD who is here for a cough and shortness of breath.  The patient was recently admitted for COPD exacerbation and discharged on same day on 12/26/2018, about a week ago.  She completed her prednisone course 2 days ago has been experiencing a worsening cough with sputum production since that time.  Her evaluation here revealed that she was initially quite anxious with some mild hypoxia, wheezing, and distress.  She improved with a single DuoNeb, dose of Solu-Medrol, and supplemental oxygen.  She is able to be weaned off of the supplemental O2 to room air with maintenance of her O2 sats.  Her evaluation including labs was reassuring with only a mild leukocytosis at 12.8 and a normal hemoglobin, and unremarkable CMP, negative troponin, negative influenza testing, normal lactic acid.  A chest x-ray was unremarkable.  Her EKG showed sinus tachycardia.  Her differential included but was not limited to bacterial pneumonia, COPD exacerbation, bronchitis, ACS, influenza.  Given her workup here I feel this is most likely a continuation of her COPD exacerbation.  She will be discharged on oral prednisone and azithromycin in addition to continuation of her home COPD treatment and nebulized albuterol as needed every 4 hours.  She will follow-up with her PCP in the next several days and the importance of close follow-up was emphasized to the patient.  Critical Care time:  none    Diagnosis:    ICD-10-CM   1. COPD exacerbation (H) J44.1       Disposition:  discharged to home    Discharge Medications:  azithromycin 500 MG tablet  Commonly known as:   ZITHROMAX  500 mg, Oral, DAILY     predniSONE 20 MG tablet  Commonly known as:  DELTASONE  Take two tablets (= 40mg) each day for 5 (five) days              Janette Boston  1/2/2019   [facility] EMERGENCY DEPARTMENT  I, Janette Boston, am serving as a scribe at 9:09 PM on 1/2/2019 to document services personally performed by Koffi Méndez W MD based on my observations and the provider's statements to me.      Srini Estrada MD  Resident  01/02/19 3122

## 2019-01-03 NOTE — DISCHARGE INSTRUCTIONS
You can use your albuterol up to every 4 hours as needed for wheezing. If you have worsening shortness of breath or increasing difficulty breathing you should get further medical attention. Take the prednisone and antibiotics as directed.

## 2019-01-08 LAB
BACTERIA SPEC CULT: NO GROWTH
BACTERIA SPEC CULT: NO GROWTH
Lab: NORMAL
Lab: NORMAL
SPECIMEN SOURCE: NORMAL
SPECIMEN SOURCE: NORMAL

## 2019-01-10 ENCOUNTER — OFFICE VISIT (OUTPATIENT)
Dept: INTERNAL MEDICINE | Facility: CLINIC | Age: 49
End: 2019-01-10
Payer: MEDICARE

## 2019-01-10 VITALS
BODY MASS INDEX: 34.88 KG/M2 | HEART RATE: 96 BPM | DIASTOLIC BLOOD PRESSURE: 62 MMHG | SYSTOLIC BLOOD PRESSURE: 110 MMHG | RESPIRATION RATE: 24 BRPM | OXYGEN SATURATION: 100 % | TEMPERATURE: 98.2 F | WEIGHT: 203.2 LBS

## 2019-01-10 DIAGNOSIS — R06.02 SOB (SHORTNESS OF BREATH): ICD-10-CM

## 2019-01-10 DIAGNOSIS — Z12.31 ENCOUNTER FOR SCREENING MAMMOGRAM FOR BREAST CANCER: ICD-10-CM

## 2019-01-10 DIAGNOSIS — E78.5 HYPERLIPIDEMIA LDL GOAL <130: Chronic | ICD-10-CM

## 2019-01-10 DIAGNOSIS — I10 HTN, GOAL BELOW 140/90: ICD-10-CM

## 2019-01-10 DIAGNOSIS — D72.829 LEUKOCYTOSIS, UNSPECIFIED TYPE: ICD-10-CM

## 2019-01-10 DIAGNOSIS — J44.9 CHRONIC OBSTRUCTIVE PULMONARY DISEASE, UNSPECIFIED COPD TYPE (H): Primary | ICD-10-CM

## 2019-01-10 DIAGNOSIS — R00.2 PALPITATIONS: ICD-10-CM

## 2019-01-10 DIAGNOSIS — R00.0 TACHYCARDIA: ICD-10-CM

## 2019-01-10 PROCEDURE — 99214 OFFICE O/P EST MOD 30 MIN: CPT | Performed by: INTERNAL MEDICINE

## 2019-01-10 ASSESSMENT — ANXIETY QUESTIONNAIRES
5. BEING SO RESTLESS THAT IT IS HARD TO SIT STILL: MORE THAN HALF THE DAYS
3. WORRYING TOO MUCH ABOUT DIFFERENT THINGS: NEARLY EVERY DAY
GAD7 TOTAL SCORE: 16
4. TROUBLE RELAXING: NEARLY EVERY DAY
7. FEELING AFRAID AS IF SOMETHING AWFUL MIGHT HAPPEN: SEVERAL DAYS
1. FEELING NERVOUS, ANXIOUS, OR ON EDGE: NEARLY EVERY DAY
GAD7 TOTAL SCORE: 16
2. NOT BEING ABLE TO STOP OR CONTROL WORRYING: SEVERAL DAYS
GAD7 TOTAL SCORE: 16
7. FEELING AFRAID AS IF SOMETHING AWFUL MIGHT HAPPEN: SEVERAL DAYS
6. BECOMING EASILY ANNOYED OR IRRITABLE: NEARLY EVERY DAY

## 2019-01-10 ASSESSMENT — PATIENT HEALTH QUESTIONNAIRE - PHQ9
SUM OF ALL RESPONSES TO PHQ QUESTIONS 1-9: 12
SUM OF ALL RESPONSES TO PHQ QUESTIONS 1-9: 12
10. IF YOU CHECKED OFF ANY PROBLEMS, HOW DIFFICULT HAVE THESE PROBLEMS MADE IT FOR YOU TO DO YOUR WORK, TAKE CARE OF THINGS AT HOME, OR GET ALONG WITH OTHER PEOPLE: EXTREMELY DIFFICULT

## 2019-01-10 NOTE — PATIENT INSTRUCTIONS
Plan:  1.Trelegy Ellipta once a day - instead of Symbicort and Spiriva   2. Referral to cardiology - To schedule this test please call [facility] at: [phone]   3. Echo cardiogram and heart monitor - To schedule this test please call [facility] at: [phone]   4. Mammogram ( please call [phone] to schedule it)   5. Follow up in 3 months  6. No change in the  Other meds
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
## 2019-01-11 ASSESSMENT — ASTHMA QUESTIONNAIRES: ACT_TOTALSCORE: 6

## 2019-01-11 ASSESSMENT — ANXIETY QUESTIONNAIRES: GAD7 TOTAL SCORE: 16

## 2019-01-12 PROBLEM — I10 HTN, GOAL BELOW 140/90: Status: ACTIVE | Noted: 2019-01-12

## 2019-01-24 ENCOUNTER — HOSPITAL ENCOUNTER (OUTPATIENT)
Dept: CARDIOLOGY | Facility: CLINIC | Age: 49
Discharge: HOME | End: 2019-01-24
Attending: INTERNAL MEDICINE | Admitting: INTERNAL MEDICINE
Payer: MEDICARE

## 2019-01-24 DIAGNOSIS — R00.0 TACHYCARDIA: ICD-10-CM

## 2019-01-24 DIAGNOSIS — R00.2 PALPITATIONS: ICD-10-CM

## 2019-01-24 DIAGNOSIS — R06.02 SOB (SHORTNESS OF BREATH): ICD-10-CM

## 2019-01-24 PROCEDURE — 93306 TTE W/DOPPLER COMPLETE: CPT | Mod: 26 | Performed by: INTERNAL MEDICINE

## 2019-01-24 PROCEDURE — 93226 XTRNL ECG REC<48 HR SCAN A/R: CPT

## 2019-01-24 PROCEDURE — 93227 XTRNL ECG REC<48 HR R&I: CPT | Performed by: INTERNAL MEDICINE

## 2019-01-24 PROCEDURE — 93306 TTE W/DOPPLER COMPLETE: CPT

## 2019-01-28 ENCOUNTER — DOCUMENTATION ONLY (OUTPATIENT)
Dept: CARE COORDINATION | Facility: CLINIC | Age: 49
End: 2019-01-28

## 2019-01-31 ENCOUNTER — TELEPHONE (OUTPATIENT)
Dept: INTERNAL MEDICINE | Facility: CLINIC | Age: 49
End: 2019-01-31

## 2019-01-31 NOTE — LETTER
[facility]  [address], [address]  [place] [address]  [phone]        February 1, 2019    Swetha Patterson  [address] [facility] [id number]            Dear Ms. Swetha Patterson:    The recent heart monitor results are in acceptable range.    Sincerely,    Roro Low MD  Internal Medicine

## 2019-01-31 NOTE — TELEPHONE ENCOUNTER
Please see message below.    Patient asking for her holter monitor results.    Please advise, thanks.

## 2019-01-31 NOTE — TELEPHONE ENCOUNTER
Clinic Action Needed:Yes, please return call    Reason for Call: Sarah is waiting for the results of her 47  holter monitor testing.  She is quite anxious and would like to get her results as soon as they are available.  Please return call to discuss further, thank you.     Routed to: RICKEY Jacobsen, NNEKA  [facility]

## 2019-02-01 NOTE — TELEPHONE ENCOUNTER
Called and spoke to patient ,she is aware of results , declined letter and results LEON Becker LPN

## 2019-02-18 ENCOUNTER — TELEPHONE (OUTPATIENT)
Dept: INTERNAL MEDICINE | Facility: CLINIC | Age: 49
End: 2019-02-18

## 2019-02-19 NOTE — TELEPHONE ENCOUNTER
The phone number patient left belongs to her sister who said to call patient at her home number.  Left message on patient's home number for her to return call to clinic.  Was going to offer one of today's hold spots if she wants to see Dr. Low.  SERJIO Chandra R.N.

## 2019-02-27 ENCOUNTER — TELEPHONE (OUTPATIENT)
Dept: INTERNAL MEDICINE | Facility: CLINIC | Age: 49
End: 2019-02-27

## 2019-02-27 NOTE — TELEPHONE ENCOUNTER
Patient calls. She is struggling to deal with the loss of her  2 weeks ago. She is not sleeping for almost 2 weeks and not able eat due to anxiety. She has also gained weight and this is making it harder to breath-has underlying lung condition. Denies using any alcohol or drugs.     Taking 6 tabs of hydroxyzine each day and not getting any relief from her anxiety. Patient is talking very quickly on the phone  Patient does not have any thoughts of harming herself or others, no weapons in the home. Her father lives with her, but she states he is a drunk and not able to provide support to her. She has appointment scheduled tomorrow with Dr. Low. Patient requesting medication to help her calm down today. She has taken Klonopin in the past for anxiety.     This RN contacted Bety to assist due to patient not sleeping and speaking very quickly. Bety provided suggestions to help patient calm herself, patient put hand under cold water, but states she didn't feel anything because she's numb. Attempted to ask patient questions about what's around her, but patient was not willing to continue this. This RN kept patient on the phone until Bety was able to arrive for further assistance. Patient states she will not speak to Bety. Patient was advised to go to ER for evaluation, but refused due to not having transportation and states she will not call 911 for an ambulance to take her to the ER.     Bety called police requesting a safety check for patient - patient was not advised of this.     Patient tells this RN that she won't stay on the phone much longer and asks why this is taking so long. This RN advised that Dr. Low is out of the office and taking time for other providers to assist as they are not familiar with her history. This RN told patient that it is okay if she doesn't want to wait on the phone any longer and asked her for a number to call back to. Patient was upset about that another  provider will not help her and states we are just pushing her aside. This RN reassured patient that we are doing the best we can to help her, patient state she may not come to appointment tomorrow. This RN asked patient to keep her appointment as scheduled and then call was disconnected.

## 2019-03-11 ENCOUNTER — TELEPHONE (OUTPATIENT)
Dept: PULMONOLOGY | Facility: CLINIC | Age: 49
End: 2019-03-11

## 2019-03-11 NOTE — TELEPHONE ENCOUNTER
[facility] Call Center    Phone Message    May a detailed message be left on voicemail: yes    Reason for Call: Other: Pt called and canceled 3/11/2019 appt with Dr. Landry due to flat tire. Pt wanted to rescheduled the appt but on the Pt 's chart it was noted that she would need to speak with Clinic Manager before rescheduling due to No-Shows. Appt 3/11/2019 was canceled. Please follow up with Pt to rescheduled appt.     Action Taken: Message routed to:  [facility] ([facility] Pulm

## 2019-03-15 ENCOUNTER — NURSE TRIAGE (OUTPATIENT)
Dept: INTERNAL MEDICINE | Facility: CLINIC | Age: 49
End: 2019-03-15

## 2019-03-15 DIAGNOSIS — L73.2 HIDRADENITIS SUPPURATIVA: ICD-10-CM

## 2019-03-15 DIAGNOSIS — L70.0 ACNE VULGARIS: ICD-10-CM

## 2019-03-15 DIAGNOSIS — F41.9 ANXIETY: ICD-10-CM

## 2019-03-15 RX ORDER — DOXYCYCLINE HYCLATE 100 MG
100 TABLET ORAL 2 TIMES DAILY
Qty: 60 TABLET | Refills: 0 | Status: SHIPPED | OUTPATIENT
Start: 2019-03-15 | End: 2019-06-11

## 2019-03-15 RX ORDER — HYDROXYZINE HYDROCHLORIDE 50 MG/1
50-100 TABLET, FILM COATED ORAL 4 TIMES DAILY PRN
Qty: 180 TABLET | Refills: 0 | Status: SHIPPED | OUTPATIENT
Start: 2019-03-15 | End: 2019-05-02

## 2019-03-15 RX ORDER — BENZOYL PEROXIDE 10 G/100G
GEL TOPICAL 2 TIMES DAILY
Qty: 90 G | Refills: 3 | Status: SHIPPED | OUTPATIENT
Start: 2019-03-15 | End: 2019-05-05

## 2019-03-15 NOTE — TELEPHONE ENCOUNTER
"Nurse attempted to triage patient, patient declined. States that she gets flare ups of open sores in the stomach folds and underneath her bottom.     Per office visit note 8/22/18:  \"She has hidradenitis suppurativa on the abdomen and buttocks.  She has a flareup on the abdomen.\"    Patient states that primary care provider prescribes doxycycline and benzoyl peroxide for this in the past. Please advise if prescriptions are warranted.      "

## 2019-03-15 NOTE — TELEPHONE ENCOUNTER
Reason for Call:  Other call back    Detailed comments: pt calling with skin concerns hives/boils    Phone Number Patient can be reached at: [phone]    Best Time: anytime    Can we leave a detailed message on this number? YES    Call taken on 3/15/2019 at 10:52 AM by Haley Sams

## 2019-03-15 NOTE — TELEPHONE ENCOUNTER
"Requested Prescriptions   Pending Prescriptions Disp Refills     hydrOXYzine (ATARAX) 50 MG tablet 180 tablet 0     Sig: Take 1-2 tablets ( mg) by mouth 4 times daily as needed for anxiety Increased dose. For anxiety    Antihistamines Protocol Passed - 3/15/2019 11:49 AM       Passed - Recent (12 mo) or future (30 days) visit within the authorizing provider's specialty    Patient had office visit in the last 12 months or has a visit in the next 30 days with authorizing provider or within the authorizing provider's specialty.  See \"Patient Info\" tab in inbasket, or \"Choose Columns\" in Meds & Orders section of the refill encounter.             Passed - Patient is age 3 or older    Apply age and/or weight-based dosing for peds patients age 3 and older.    Forward request to provider for patients under the age of 3.         Passed - Medication is active on med list        Last office visit: 1/10/19  Last refill: 12/28/18  "

## 2019-03-19 DIAGNOSIS — J44.9 COPD (CHRONIC OBSTRUCTIVE PULMONARY DISEASE) (H): Primary | ICD-10-CM

## 2019-03-26 ENCOUNTER — TELEPHONE (OUTPATIENT)
Facility: CLINIC | Age: 49
End: 2019-03-26

## 2019-03-26 NOTE — TELEPHONE ENCOUNTER
Sarah has had multiple cancels and no shows to the Pulmonary clinic and is being given a chance to show one more time. She has progressing lung disease and must be seen but will not come to her scheduled appointments. Sarah has a new consult with Dr Henrández on May 13 2019. We are hopefull she will show to the appointment.    Wm Lao CMA at 12:57 PM on 3/26/2019

## 2019-03-27 ENCOUNTER — TELEPHONE (OUTPATIENT)
Dept: INTERNAL MEDICINE | Facility: CLINIC | Age: 49
End: 2019-03-27

## 2019-03-27 DIAGNOSIS — B37.31 YEAST INFECTION OF THE VAGINA: Primary | ICD-10-CM

## 2019-03-27 NOTE — TELEPHONE ENCOUNTER
Reason for Call:  Medication or medication refill:    Do you use a [facility]?  Name of the pharmacy and phone number for the current request:  [place] [address] [facility] (SPECIALTY) - [phone]    Name of the medication requested: Pt is on doxycycline and has a yeast infec.  Pt would like a rx-Fluconazole.  Other request: none    Can we leave a detailed message on this number? YES    Phone number patient can be reached at: Home number on file [phone] (home)    Best Time: any    Call taken on 3/27/2019 at 10:47 AM by Roseanne Mooney

## 2019-03-28 RX ORDER — FLUCONAZOLE 150 MG/1
150 TABLET ORAL ONCE
Qty: 1 TABLET | Refills: 1 | Status: SHIPPED | OUTPATIENT
Start: 2019-03-28 | End: 2019-06-11

## 2019-04-23 DIAGNOSIS — D72.829 LEUKOCYTOSIS, UNSPECIFIED TYPE: ICD-10-CM

## 2019-04-23 LAB
BASOPHILS # BLD AUTO: 0 10E9/L (ref 0–0.2)
BASOPHILS NFR BLD AUTO: 0.2 %
DIFFERENTIAL METHOD BLD: NORMAL
EOSINOPHIL # BLD AUTO: 0 10E9/L (ref 0–0.7)
EOSINOPHIL NFR BLD AUTO: 0 %
ERYTHROCYTE [DISTWIDTH] IN BLOOD BY AUTOMATED COUNT: 13.8 % (ref 10–15)
HCT VFR BLD AUTO: 44.8 % (ref 35–47)
HGB BLD-MCNC: 14.5 G/DL (ref 11.7–15.7)
LYMPHOCYTES # BLD AUTO: 3.2 10E9/L (ref 0.8–5.3)
LYMPHOCYTES NFR BLD AUTO: 35.8 %
MCH RBC QN AUTO: 29.2 PG (ref 26.5–33)
MCHC RBC AUTO-ENTMCNC: 32.4 G/DL (ref 31.5–36.5)
MCV RBC AUTO: 90 FL (ref 78–100)
MONOCYTES # BLD AUTO: 0.6 10E9/L (ref 0–1.3)
MONOCYTES NFR BLD AUTO: 6.8 %
NEUTROPHILS # BLD AUTO: 5.2 10E9/L (ref 1.6–8.3)
NEUTROPHILS NFR BLD AUTO: 57.2 %
PLATELET # BLD AUTO: 359 10E9/L (ref 150–450)
RBC # BLD AUTO: 4.96 10E12/L (ref 3.8–5.2)
WBC # BLD AUTO: 9 10E9/L (ref 4–11)

## 2019-04-23 PROCEDURE — 85025 COMPLETE CBC W/AUTO DIFF WBC: CPT | Performed by: INTERNAL MEDICINE

## 2019-04-23 PROCEDURE — 36415 COLL VENOUS BLD VENIPUNCTURE: CPT | Performed by: INTERNAL MEDICINE

## 2019-04-25 NOTE — TELEPHONE ENCOUNTER
RECORDS RECEIVED FROM:    DATE RECEIVED: 5.13.19   NOTES STATUS DETAILS   OFFICE NOTE from referring provider Internal 6.28.18 Dr. Shirley   OFFICE NOTE from other specialist Internal 4.10.18   DISCHARGE SUMMARY from hospital Internal 12.26.18   DISCHARGE REPORT from the ER Internal 1.2.19  11.4.18   OPERATIVE REPORT N/A    MEDICATION LIST Internal    IMAGING  (NEED IMAGES AND REPORTS)     CT SCAN Internal 12.26.18   CHEST XRAY (CXR) Internal 1.2.19 12.26.18  11.4.18  7.30.18  6.28.18  2.8.18   TESTS     PULMONARY FUNCTION TESTING (PFT) In process Scheduled for 5.13.19   FLOW LOOP VOLUME (FVL) In process    CYSTIC FIBROSIS     CF SPUTUM CULTURE N/A

## 2019-04-30 ENCOUNTER — TELEPHONE (OUTPATIENT)
Dept: INTERNAL MEDICINE | Facility: CLINIC | Age: 49
End: 2019-04-30

## 2019-04-30 NOTE — TELEPHONE ENCOUNTER
Reason for Call:  Home Health Care    Cherelle fregoso   with [facility] called regarding (reason for call): orders     Orders are needed for this patient. Skilled nursing     PT: n/a    OT: n/a    Skilled Nursing: pt. would like a skilled nurse to come in and help with meds, bp and to check 02 level.  She is wondering if she should have bp meter, bp cuff  and meter to check 02 level in home    Pt Provider: girma     Phone Number Homecare Nurse can be reached at: Cherelle [phone]    Can we leave a detailed message on this number? YES    Phone number patient can be reached at: Other phone number:  n/a    Best Time: asap    Call taken on 4/30/2019 at 1:46 PM by Jossy Nunez

## 2019-05-02 ENCOUNTER — OFFICE VISIT (OUTPATIENT)
Dept: BEHAVIORAL HEALTH | Facility: CLINIC | Age: 49
End: 2019-05-02
Payer: MEDICARE

## 2019-05-02 ENCOUNTER — OFFICE VISIT (OUTPATIENT)
Dept: INTERNAL MEDICINE | Facility: CLINIC | Age: 49
End: 2019-05-02
Payer: MEDICARE

## 2019-05-02 VITALS
BODY MASS INDEX: 37.05 KG/M2 | HEIGHT: 64 IN | WEIGHT: 217 LBS | DIASTOLIC BLOOD PRESSURE: 82 MMHG | OXYGEN SATURATION: 98 % | SYSTOLIC BLOOD PRESSURE: 128 MMHG | TEMPERATURE: 98.1 F | RESPIRATION RATE: 24 BRPM | HEART RATE: 89 BPM

## 2019-05-02 DIAGNOSIS — F41.9 ANXIETY: ICD-10-CM

## 2019-05-02 DIAGNOSIS — L73.2 HIDRADENITIS SUPPURATIVA: Primary | ICD-10-CM

## 2019-05-02 DIAGNOSIS — R73.9 BLOOD SUGAR INCREASED: ICD-10-CM

## 2019-05-02 DIAGNOSIS — R69 DIAGNOSIS DEFERRED: Primary | ICD-10-CM

## 2019-05-02 DIAGNOSIS — E78.5 HYPERLIPIDEMIA LDL GOAL <130: Chronic | ICD-10-CM

## 2019-05-02 DIAGNOSIS — J30.89 DUST ALLERGY: ICD-10-CM

## 2019-05-02 DIAGNOSIS — Z11.59 ENCOUNTER FOR HEPATITIS C SCREENING TEST FOR LOW RISK PATIENT: ICD-10-CM

## 2019-05-02 DIAGNOSIS — I10 HTN, GOAL BELOW 140/90: ICD-10-CM

## 2019-05-02 DIAGNOSIS — E55.9 VITAMIN D DEFICIENCY: ICD-10-CM

## 2019-05-02 DIAGNOSIS — J44.9 SEVERE CHRONIC OBSTRUCTIVE PULMONARY DISEASE (H): Chronic | ICD-10-CM

## 2019-05-02 DIAGNOSIS — B37.31 YEAST INFECTION OF THE VAGINA: ICD-10-CM

## 2019-05-02 PROCEDURE — 99214 OFFICE O/P EST MOD 30 MIN: CPT | Performed by: INTERNAL MEDICINE

## 2019-05-02 RX ORDER — MONTELUKAST SODIUM 10 MG/1
10 TABLET ORAL AT BEDTIME
Qty: 90 TABLET | Refills: 0 | Status: SHIPPED | OUTPATIENT
Start: 2019-05-02 | End: 2019-09-01

## 2019-05-02 RX ORDER — HYDROXYZINE HYDROCHLORIDE 50 MG/1
50-100 TABLET, FILM COATED ORAL 4 TIMES DAILY PRN
Qty: 180 TABLET | Refills: 1 | Status: SHIPPED | OUTPATIENT
Start: 2019-05-02 | End: 2019-11-18

## 2019-05-02 RX ORDER — DOXYCYCLINE HYCLATE 100 MG
100 TABLET ORAL 2 TIMES DAILY
Qty: 90 TABLET | Refills: 0 | Status: SHIPPED | OUTPATIENT
Start: 2019-05-02 | End: 2019-10-11

## 2019-05-02 RX ORDER — FLUCONAZOLE 150 MG/1
150 TABLET ORAL DAILY
Qty: 10 TABLET | Refills: 2 | Status: SHIPPED | OUTPATIENT
Start: 2019-05-02 | End: 2019-06-24

## 2019-05-02 RX ORDER — CLINDAMYCIN AND BENZOYL PEROXIDE 10; 50 MG/G; MG/G
GEL TOPICAL 2 TIMES DAILY
Qty: 100 G | Refills: 11 | Status: SHIPPED | OUTPATIENT
Start: 2019-05-02 | End: 2019-06-11 | Stop reason: ALTCHOICE

## 2019-05-02 ASSESSMENT — ANXIETY QUESTIONNAIRES
3. WORRYING TOO MUCH ABOUT DIFFERENT THINGS: NEARLY EVERY DAY
6. BECOMING EASILY ANNOYED OR IRRITABLE: NEARLY EVERY DAY
IF YOU CHECKED OFF ANY PROBLEMS ON THIS QUESTIONNAIRE, HOW DIFFICULT HAVE THESE PROBLEMS MADE IT FOR YOU TO DO YOUR WORK, TAKE CARE OF THINGS AT HOME, OR GET ALONG WITH OTHER PEOPLE: EXTREMELY DIFFICULT
GAD7 TOTAL SCORE: 21
2. NOT BEING ABLE TO STOP OR CONTROL WORRYING: NEARLY EVERY DAY
1. FEELING NERVOUS, ANXIOUS, OR ON EDGE: NEARLY EVERY DAY
5. BEING SO RESTLESS THAT IT IS HARD TO SIT STILL: NEARLY EVERY DAY
7. FEELING AFRAID AS IF SOMETHING AWFUL MIGHT HAPPEN: NEARLY EVERY DAY

## 2019-05-02 ASSESSMENT — PATIENT HEALTH QUESTIONNAIRE - PHQ9
SUM OF ALL RESPONSES TO PHQ QUESTIONS 1-9: 22
5. POOR APPETITE OR OVEREATING: NEARLY EVERY DAY
10. IF YOU CHECKED OFF ANY PROBLEMS, HOW DIFFICULT HAVE THESE PROBLEMS MADE IT FOR YOU TO DO YOUR WORK, TAKE CARE OF THINGS AT HOME, OR GET ALONG WITH OTHER PEOPLE: EXTREMELY DIFFICULT
SUM OF ALL RESPONSES TO PHQ QUESTIONS 1-9: 22

## 2019-05-02 ASSESSMENT — MIFFLIN-ST. JEOR: SCORE: 1589.31

## 2019-05-02 NOTE — NURSING NOTE
"/82   Pulse 89   Temp 98.1  F (36.7  C) (Oral)   Resp 24   Ht 1.626 m (5' 4\")   Wt 98.4 kg (217 lb)   LMP 04/15/2016   SpO2 98%   BMI 37.25 kg/m      "
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
## 2019-05-02 NOTE — PATIENT INSTRUCTIONS
Plan:  1. doxycyline 100 mg daily for 3 months  2. Diflucan 150 mg every 3 days - only as needed for yeast vaginitis   3. Please make a lab appointment for fasting labs    4. Follow up in 3-4 weeks
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
## 2019-05-03 ENCOUNTER — TELEPHONE (OUTPATIENT)
Dept: INTERNAL MEDICINE | Facility: CLINIC | Age: 49
End: 2019-05-03

## 2019-05-03 DIAGNOSIS — L73.2 HIDRADENITIS SUPPURATIVA: ICD-10-CM

## 2019-05-03 ASSESSMENT — ASTHMA QUESTIONNAIRES: ACT_TOTALSCORE: 7

## 2019-05-03 ASSESSMENT — ANXIETY QUESTIONNAIRES: GAD7 TOTAL SCORE: 21

## 2019-05-03 NOTE — TELEPHONE ENCOUNTER
Patient's insurance does not cover the Benzaclin.  Formulary alternatives are: Erythromycin with ethanol, clindamycin phosphate, adapalene, tretinoin, sulfacetamide sodium (acne).    Thank you!  Leeann Almonte - Pharmacy Tech  [facility]  [phone]

## 2019-05-05 RX ORDER — BENZOYL PEROXIDE 10 G/100G
GEL TOPICAL 2 TIMES DAILY
Qty: 90 G | Refills: 3 | Status: SHIPPED | OUTPATIENT
Start: 2019-05-05 | End: 2020-02-20

## 2019-05-05 RX ORDER — CLINDAMYCIN PHOSPHATE 10 MG/G
GEL TOPICAL 2 TIMES DAILY
Qty: 60 G | Refills: 3 | Status: SHIPPED | OUTPATIENT
Start: 2019-05-05 | End: 2020-02-20

## 2019-05-09 ENCOUNTER — DOCUMENTATION ONLY (OUTPATIENT)
Dept: CARE COORDINATION | Facility: CLINIC | Age: 49
End: 2019-05-09

## 2019-05-09 PROBLEM — Z53.9 NO SHOW: Status: ACTIVE | Noted: 2019-05-09

## 2019-05-09 NOTE — PROGRESS NOTES
Dr. Low,  This message is here for your information only, you do not need to reply.  We received a referral for your patient Swetha from you, thank you for that.  We have reached out to the patient several times and tried to schedule a nurse visit.  We had twice scheduled, then she cancelled them at the last minute.  She has again requested a delay to next week.    We are just letting you know that we will need a new referral and orders, when the patient decides she will accept a visit.  Thank you,    Liane Abrams RN, BSN Clinical Coordinator  [facility]

## 2019-05-10 ENCOUNTER — HOSPITAL ENCOUNTER (OUTPATIENT)
Dept: GENERAL RADIOLOGY | Facility: CLINIC | Age: 49
Discharge: HOME | End: 2019-05-10
Payer: COMMERCIAL

## 2019-05-10 DIAGNOSIS — I10 HTN, GOAL BELOW 140/90: ICD-10-CM

## 2019-05-10 DIAGNOSIS — J44.9 COPD (CHRONIC OBSTRUCTIVE PULMONARY DISEASE) (H): ICD-10-CM

## 2019-05-10 DIAGNOSIS — E55.9 VITAMIN D DEFICIENCY: ICD-10-CM

## 2019-05-10 DIAGNOSIS — R73.9 BLOOD SUGAR INCREASED: ICD-10-CM

## 2019-05-10 DIAGNOSIS — E78.5 HYPERLIPIDEMIA LDL GOAL <130: Chronic | ICD-10-CM

## 2019-05-10 DIAGNOSIS — Z11.59 ENCOUNTER FOR HEPATITIS C SCREENING TEST FOR LOW RISK PATIENT: ICD-10-CM

## 2019-05-10 LAB
ALBUMIN SERPL-MCNC: 3.5 G/DL (ref 3.4–5)
ALP SERPL-CCNC: 141 U/L (ref 40–150)
ALT SERPL W P-5'-P-CCNC: 47 U/L (ref 0–50)
ANION GAP SERPL CALCULATED.3IONS-SCNC: 7 MMOL/L (ref 3–14)
AST SERPL W P-5'-P-CCNC: 29 U/L (ref 0–45)
BILIRUB SERPL-MCNC: 0.4 MG/DL (ref 0.2–1.3)
BUN SERPL-MCNC: 18 MG/DL (ref 7–30)
CALCIUM SERPL-MCNC: 9.4 MG/DL (ref 8.5–10.1)
CHLORIDE SERPL-SCNC: 108 MMOL/L (ref 94–109)
CHOLEST SERPL-MCNC: 266 MG/DL
CO2 SERPL-SCNC: 25 MMOL/L (ref 20–32)
CREAT SERPL-MCNC: 0.88 MG/DL (ref 0.52–1.04)
ERYTHROCYTE [DISTWIDTH] IN BLOOD BY AUTOMATED COUNT: 14.4 % (ref 10–15)
GFR SERPL CREATININE-BSD FRML MDRD: 77 ML/MIN/{1.73_M2}
GLUCOSE SERPL-MCNC: 100 MG/DL (ref 70–99)
HBA1C MFR BLD: 5.6 % (ref 0–5.6)
HCT VFR BLD AUTO: 44.6 % (ref 35–47)
HDLC SERPL-MCNC: 50 MG/DL
HGB BLD-MCNC: 14.4 G/DL (ref 11.7–15.7)
LDLC SERPL CALC-MCNC: 176 MG/DL
MCH RBC QN AUTO: 29.1 PG (ref 26.5–33)
MCHC RBC AUTO-ENTMCNC: 32.3 G/DL (ref 31.5–36.5)
MCV RBC AUTO: 90 FL (ref 78–100)
NONHDLC SERPL-MCNC: 216 MG/DL
PLATELET # BLD AUTO: 353 10E9/L (ref 150–450)
POTASSIUM SERPL-SCNC: 4.2 MMOL/L (ref 3.4–5.3)
PROT SERPL-MCNC: 7.8 G/DL (ref 6.8–8.8)
RBC # BLD AUTO: 4.94 10E12/L (ref 3.8–5.2)
SODIUM SERPL-SCNC: 140 MMOL/L (ref 133–144)
TRIGL SERPL-MCNC: 200 MG/DL
TSH SERPL DL<=0.005 MIU/L-ACNC: 1.11 MU/L (ref 0.4–4)
WBC # BLD AUTO: 7.9 10E9/L (ref 4–11)

## 2019-05-10 PROCEDURE — 85027 COMPLETE CBC AUTOMATED: CPT | Performed by: INTERNAL MEDICINE

## 2019-05-10 PROCEDURE — 82306 VITAMIN D 25 HYDROXY: CPT | Performed by: INTERNAL MEDICINE

## 2019-05-10 PROCEDURE — 86803 HEPATITIS C AB TEST: CPT | Performed by: INTERNAL MEDICINE

## 2019-05-10 PROCEDURE — 83036 HEMOGLOBIN GLYCOSYLATED A1C: CPT | Performed by: INTERNAL MEDICINE

## 2019-05-10 PROCEDURE — 82043 UR ALBUMIN QUANTITATIVE: CPT | Performed by: INTERNAL MEDICINE

## 2019-05-10 PROCEDURE — 36415 COLL VENOUS BLD VENIPUNCTURE: CPT | Performed by: INTERNAL MEDICINE

## 2019-05-10 PROCEDURE — 80061 LIPID PANEL: CPT | Performed by: INTERNAL MEDICINE

## 2019-05-10 PROCEDURE — 71046 X-RAY EXAM CHEST 2 VIEWS: CPT

## 2019-05-10 PROCEDURE — 84443 ASSAY THYROID STIM HORMONE: CPT | Performed by: INTERNAL MEDICINE

## 2019-05-10 PROCEDURE — 80053 COMPREHEN METABOLIC PANEL: CPT | Performed by: INTERNAL MEDICINE

## 2019-05-11 LAB
CREAT UR-MCNC: 176 MG/DL
MICROALBUMIN UR-MCNC: 16 MG/L
MICROALBUMIN/CREAT UR: 9.32 MG/G CR (ref 0–25)

## 2019-05-13 ENCOUNTER — PRE VISIT (OUTPATIENT)
Dept: PULMONOLOGY | Facility: CLINIC | Age: 49
End: 2019-05-13

## 2019-05-13 LAB
DEPRECATED CALCIDIOL+CALCIFEROL SERPL-MC: 17 UG/L (ref 20–75)
HCV AB SERPL QL IA: NONREACTIVE

## 2019-06-03 ENCOUNTER — TELEPHONE (OUTPATIENT)
Dept: BEHAVIORAL HEALTH | Facility: CLINIC | Age: 49
End: 2019-06-03

## 2019-06-03 NOTE — TELEPHONE ENCOUNTER
[facility]  [facility]: [place]        Social Work Care Navigator Note      Patient: Swetha Patterson  Date: Lakeshia 3, 2019  Preferred Name: Swetha    Previous PHQ-9:   PHQ-9 SCORE 1/10/2019 5/2/2019 5/2/2019   PHQ-9 Total Score - - -   PHQ-9 Total Score MyChart 12 (Moderate depression) - 22 (Severe depression)   PHQ-9 Total Score 12 22 27     Previous GIDEON-7:   GIDEON-7 SCORE 8/22/2018 1/10/2019 5/2/2019   Total Score - - -   Total Score - 16 (severe anxiety) -   Total Score 7 16 21     EROS LEVEL:  EROS Score (Last Two) 2/19/2013   EROS Raw Score 40   Activation Score 60   EROS Level 3       Preferred Contact:  Need for : No  Preferred Contact: Cell      Type of Contact Today: Phone call (patient / identified key support person reached)      Data: (subjective / Objective):  Recent ED/IP Admission or Discharge?   None    Patient Goals:  No data recorded      [facility] Core Service Provided:  Health and Wellness Promotion    Current Stressors / Issues / Care Plan Objective Addressed Today:  [facility] Enrollment    Intervention:  Motivational Interviewing: Supported Autonomy, Collaboration, Evocation, Permission to raise concern or advise and Open-ended questions   Target Behavior(s): Explored current social supports and reinforced opportunities to increase engagement    Assessment: (Progress on Goals / Homework):  [facility] called patient to see if she was still interested in enrolling in [facility] services. Patient states that she recently was just opened to the [employer] Waiver and is to start [facility] services as well. She feels like these services are sufficient at this time and will reach out to this worker or PCP if she is interested in the future with [facility]. [facility] told her that if she is interested in the future, it can be added in addition to those services that are already in place. Patient understood and thanked this worker for the call.    Plan: (Homework, other):  Patient was encouraged to continue to seek  condition-related information and education.       PRESTON Ma  [facility] Care Coordinator-  [facility]  [phone]                  Next 5 appointments (look out 90 days)    Gonzalo 10, 2019 12:00 PM CDT  SHORT with Roro Chawla MD  [facility] ([facility]) [address]  [facility] [address]  [phone]

## 2019-06-11 ENCOUNTER — OFFICE VISIT (OUTPATIENT)
Dept: INTERNAL MEDICINE | Facility: CLINIC | Age: 49
End: 2019-06-11
Payer: COMMERCIAL

## 2019-06-11 VITALS
TEMPERATURE: 98.3 F | BODY MASS INDEX: 37.58 KG/M2 | WEIGHT: 220.1 LBS | HEIGHT: 64 IN | DIASTOLIC BLOOD PRESSURE: 74 MMHG | HEART RATE: 78 BPM | SYSTOLIC BLOOD PRESSURE: 106 MMHG | OXYGEN SATURATION: 96 % | RESPIRATION RATE: 22 BRPM

## 2019-06-11 DIAGNOSIS — R53.83 OTHER FATIGUE: ICD-10-CM

## 2019-06-11 DIAGNOSIS — E55.9 VITAMIN D DEFICIENCY: ICD-10-CM

## 2019-06-11 DIAGNOSIS — M25.512 BILATERAL SHOULDER PAIN, UNSPECIFIED CHRONICITY: ICD-10-CM

## 2019-06-11 DIAGNOSIS — E78.5 HYPERLIPIDEMIA LDL GOAL <130: Chronic | ICD-10-CM

## 2019-06-11 DIAGNOSIS — M25.511 BILATERAL SHOULDER PAIN, UNSPECIFIED CHRONICITY: ICD-10-CM

## 2019-06-11 DIAGNOSIS — J43.8 OTHER EMPHYSEMA (H): ICD-10-CM

## 2019-06-11 DIAGNOSIS — R60.0 BILATERAL LEG EDEMA: ICD-10-CM

## 2019-06-11 DIAGNOSIS — I10 HTN, GOAL BELOW 140/90: ICD-10-CM

## 2019-06-11 DIAGNOSIS — R07.9 CHEST PAIN, UNSPECIFIED TYPE: Primary | ICD-10-CM

## 2019-06-11 DIAGNOSIS — G25.81 RESTLESS LEGS SYNDROME (RLS): ICD-10-CM

## 2019-06-11 PROCEDURE — 83550 IRON BINDING TEST: CPT | Performed by: INTERNAL MEDICINE

## 2019-06-11 PROCEDURE — 82607 VITAMIN B-12: CPT | Performed by: INTERNAL MEDICINE

## 2019-06-11 PROCEDURE — 36415 COLL VENOUS BLD VENIPUNCTURE: CPT | Performed by: INTERNAL MEDICINE

## 2019-06-11 PROCEDURE — 83540 ASSAY OF IRON: CPT | Performed by: INTERNAL MEDICINE

## 2019-06-11 PROCEDURE — 82728 ASSAY OF FERRITIN: CPT | Performed by: INTERNAL MEDICINE

## 2019-06-11 PROCEDURE — 99214 OFFICE O/P EST MOD 30 MIN: CPT | Performed by: INTERNAL MEDICINE

## 2019-06-11 PROCEDURE — 93000 ELECTROCARDIOGRAM COMPLETE: CPT | Performed by: INTERNAL MEDICINE

## 2019-06-11 RX ORDER — ATORVASTATIN CALCIUM 80 MG/1
80 TABLET, FILM COATED ORAL DAILY
Qty: 90 TABLET | Refills: 1 | Status: SHIPPED | OUTPATIENT
Start: 2019-06-11 | End: 2019-12-12

## 2019-06-11 RX ORDER — PRAMIPEXOLE DIHYDROCHLORIDE 0.12 MG/1
.125-.25 TABLET ORAL
Qty: 60 TABLET | Refills: 3 | Status: SHIPPED | OUTPATIENT
Start: 2019-06-11 | End: 2019-12-12

## 2019-06-11 RX ORDER — NITROGLYCERIN 0.4 MG/1
TABLET SUBLINGUAL
Qty: 25 TABLET | Refills: 0 | Status: SHIPPED | OUTPATIENT
Start: 2019-06-11 | End: 2019-10-11

## 2019-06-11 RX ORDER — ALBUTEROL SULFATE 90 UG/1
2 AEROSOL, METERED RESPIRATORY (INHALATION) EVERY 6 HOURS PRN
Qty: 18 G | Refills: 3 | Status: SHIPPED | OUTPATIENT
Start: 2019-06-11 | End: 2019-12-12

## 2019-06-11 RX ORDER — MULTIVITAMIN WITH IRON
1 TABLET ORAL DAILY
Qty: 90 TABLET | Refills: 0 | Status: SHIPPED | OUTPATIENT
Start: 2019-06-11 | End: 2019-06-24

## 2019-06-11 RX ORDER — FUROSEMIDE 20 MG
20 TABLET ORAL DAILY
Qty: 90 TABLET | Refills: 1 | Status: SHIPPED | OUTPATIENT
Start: 2019-06-11 | End: 2019-12-12

## 2019-06-11 ASSESSMENT — MIFFLIN-ST. JEOR: SCORE: 1603.37

## 2019-06-11 NOTE — NURSING NOTE
"/74 (BP Location: Right arm, Patient Position: Sitting, Cuff Size: Adult Large)   Pulse 78   Temp 98.3  F (36.8  C) (Oral)   Resp 22   Ht 1.626 m (5' 4\")   Wt 99.8 kg (220 lb 1.6 oz)   LMP 04/15/2016 (Exact Date)   SpO2 96%   Breastfeeding? No   BMI 37.78 kg/m    Carolyn Villarreal CMA    "

## 2019-06-11 NOTE — PATIENT INSTRUCTIONS
Plan:  1. Heart stress test - To schedule this test please call [facility] at: [phone]   2. Nitroglycerin 0.4 tab - take it if chest pain   3. Vit D 3  59162 units once a week for 3 months,  After this prescription with Vit D 3 2000 unites OTC  4. Magnesium 250 -400 mg daily at bed time. Stop it if diarrhea   5. Pramipexole 0.125mg take 1-2 tabs before bed time for RLS  6. Labs today - [address]

## 2019-06-11 NOTE — PROGRESS NOTES
Dr Low's note      Patient's instructions / PLAN:                                                        Plan:  1. Heart stress test - To schedule this test please call [facility] at: [phone]   2. Nitroglycerin 0.4 tab - take it if chest pain   3. Vit D 3  28845 units once a week for 3 months,  After this prescription with Vit D 3 2000 unites OTC  4. Magnesium 250 -400 mg daily at bed time. Stop it if diarrhea   5. Pramipexole 0.125mg take 1-2 tabs before bed time for RLS  6. Labs today - [address]        ASSESSMENT & PLAN:                                                      (R07.9) Chest pain, unspecified type  (primary encounter diagnosis)  Comment: The chest pain does not look your with exertion.  She has history of severe reflux.  She does have CAD risk factors.  I advised her if the pain persist, she needs to be evaluated in emergency room.  Plan: EKG 12-lead complete w/read - [place]         Lexiscan stress test, nitroGLYcerin (NITROSTAT)        0.4 MG sublingual tablet            (J43.8) Other emphysema (H)  Comment:   Plan: albuterol (PROAIR HFA/PROVENTIL HFA/VENTOLIN         HFA) 108 (90 Base) MCG/ACT inhaler        She needs to make appointment with pulmonary department    (E78.5) Hyperlipidemia LDL goal <130  Comment: Not controlled   Plan: atorvastatin (LIPITOR) 80 MG tablet            (I10) HTN, goal below 140/90  Comment: Controlled    Plan: Continue same meds, same doses for now     (E55.9) Vitamin D deficiency  Comment:   Plan: vitamin D3 (CHOLECALCIFEROL) 03316 units         capsule            (R60.0) Bilateral leg edema  Comment:   Plan: furosemide (LASIX) 20 MG tablet            (G25.81) Restless legs syndrome (RLS) - new dx  Comment: We discussed about the new meds, advantages and potential side effects. The patient will read also the info from the pharmacy and call back if questions.   Plan: magnesium 250 MG tablet, pramipexole (MIRAPEX)         0.125 MG tablet, Vitamin B12, Iron and  iron         binding capacity, Ferritin            (R53.83) Other fatigue  Comment:   Plan: Iron and iron binding capacity, Ferritin            (M25.511,  M25.512) Bilateral shoulder pain, unspecified chronicity   Comment:   Plan: Iron and iron binding capacity, Ferritin               Chief complaint:                                                      Chest pain, shortness of breath, hyperlipidemia, RLS    SUBJECTIVE:                                                    History of present illness:    Labs May 10 discussed.    Hyperlipidemia.  She has not been taking the Lipitor.  She agrees to start.    Low vitamin D.  Plan as above    Chronic lung disease with chronic cough  --Unfortunately she still smokes.  --She has not made a follow-up appointment with the pulmonary department  --On exam today she coughs frequently and she has bilateral wheezes    *Recheck-Follow up LOV 5/2/19. Needs refills of all of her medications that she would need to restart (including things like vitamin D and cetirizine).   -Discuss restless legs, having to move around constantly to the point it interferes with her sleep.     CP  --Intermittent chest pain X a little over 1 month, seems to be happening more frequently and more often after eating.  If she pushes on the area (upper epigastric area), that will sometimes lessen the pain. Usually accompanied by increased shortness of breath, blurry vision.    --Chest pain is not associated with exertion.  Not associated with palpitation or diaphoresis  -- worried about CAD. ECG today in normal range   -- it lasts usually few seconds to 2 minutes.  In couple of occasions it lasted 12 minutes    Anxiety  -- she  will be meeting with a new psychiatrist and psychologist at the end of this month.     RLS:  --She tries to go to bed, the legs are jerking.  She feels crawling sensations from the ankle up to the knees.  The symptoms are relieved by movements.  It takes an hour or so under she is able  "to fall asleep  --She thinks she has RLS.  She has not been diagnosed before.    Hyperlipidemia Follow-Up      Are you having any of the following symptoms? (Select all that apply)  Shortness of breath, Increased sweating or nausea with activity, Chest pain or pressure and Pain in calves when walking 1-2 blocks    Are you regularly taking any medication or supplement to lower your cholesterol?   No    Are you having muscle aches or other side effects that you think could be caused by your cholesterol lowering medication?  No, possibly      Hypertension Follow-up      Do you check your blood pressure regularly outside of the clinic? No     Are you following a low salt diet? No    Are your blood pressures ever more than 140 on the top number (systolic) OR more   than 90 on the bottom number (diastolic), for example 140/90? Yes    Amount of exercise or physical activity: 4-5 days/week for an average of 20 minutes    Problems taking medications regularly: No    Medication side effects: none    Diet: regular (no restrictions)      Review of Systems:                                                      ROS: negative for fever, chills, cough, wheezes,   vomiting, abdominal pain, Pos for cough, leg edema and chest pain, as above       OBJECTIVE:             Physical exam:  Blood pressure 106/74, pulse 78, temperature 98.3  F (36.8  C), temperature source Oral, resp. rate 22, height 1.626 m (5' 4\"), weight 99.8 kg (220 lb 1.6 oz), last menstrual period 04/15/2016, SpO2 96 %, not currently breastfeeding.   NAD, appears comfortable  Skin: no rashes   Neck: supple, no JVD,  No thyroidmegaly. Lymph nodes nonpalpable cervical and supraclavicular.  Chest: Wheezes bilaterally, good respiratory effort  Heart: S1 S2, RRR, no mgr appreciated  Abdomen: soft, not tender,   Extremities: Trace edema,   Neurologic: A, Ox3, no focal signs appreciated    PMHx: reviewed  Past Medical History:   Diagnosis Date     Anxiety state, unspecified  " "    Asthma      Chronic pain     back pain from cyst     Contact dermatitis and other eczema, due to unspecified cause      COPD (chronic obstructive pulmonary disease) (H)      Depressive disorder, not elsewhere classified      Emphysema with chronic bronchitis (H)      Esophageal reflux      Family history of ischemic heart disease      Gastro-oesophageal reflux disease      History of emphysema      Hoarseness      HTN, goal below 140/90     No cardilogist     Hyperlipidemia LDL goal <130      Liver disease     \"fatty liver\"     Other chronic pain     chest, from coughing     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)      PONV (postoperative nausea and vomiting)       PSHx: reviewed  Past Surgical History:   Procedure Laterality Date     ARTHROSCOPY SHOULDER DECOMPRESSION Left 10/21/2015    Procedure: ARTHROSCOPY SHOULDER DECOMPRESSION;  Surgeon: Julien Milian MD;  Location: [facility]     BRONCHIAL THERMOPLASTY N/A 11/14/2014    Procedure: BRONCHIAL THERMOPLASTY;  Surgeon: Ward Whitaker MD;  Location: [facility]     BRONCHIAL THERMOPLASTY N/A 12/19/2014    Procedure: BRONCHIAL THERMOPLASTY;  Surgeon: Ward Whitaker MD;  Location: [facility]     BRONCHIAL THERMOPLASTY N/A 2/6/2015    Procedure: BRONCHIAL THERMOPLASTY;  Surgeon: Ward Whitaker MD;  Location: [facility]     C APPENDECTOMY  at age 17     COLONOSCOPY N/A 11/26/2018    Procedure: COLONOSCOPY ([facility]);  Surgeon: Marshall Oakes MD;  Location: [facility] OR     DISCECTOMY, FUSION CERVICAL ANTERIOR ONE LEVEL, COMBINED N/A 5/1/2018    Procedure: COMBINED DISCECTOMY, FUSION CERVICAL ANTERIOR ONE LEVEL;  1.  C5-C6 anterior cervical diskectomy and fusion.    2.  C5-C6 application of intervertebral biomechanical device for interbody fusion purposes.    3.  C5-C6 anterior instrumentation using the standard Kristin InViZia 24 mm plate with four associated bone screws, 12 mm in length.  Inferior screws are fixed.  Superior screws are va     EXCISE NODE " MEDIASTINAL  4/26/2013    Procedure: EXCISE NODE MEDIASTINAL;;  Surgeon: Av Peña MD;  Location: [facility]     LAPAROSCOPIC CHOLECYSTECTOMY N/A 10/19/2017    Procedure: LAPAROSCOPIC CHOLECYSTECTOMY;  LAPAROSCOPIC CHOLECYSTECTOMY;  Surgeon: Ney Jerry MD;  Location: [facility] OR     THORACOSCOPY  4/26/2013    Procedure: THORACOSCOPY;  LEFT VIDEO ASSISTED THORACOSCOPY, RESECTION OF POSTERIOR MEDIASTINAL MASS;  Surgeon: Av Peña MD;  Location: [facility] OR     TONSILLECTOMY  as a kid     TONSILLECTOMY          Meds: reviewed  Current Outpatient Medications   Medication Sig Dispense Refill     albuterol (2.5 MG/3ML) 0.083% neb solution Take 1 vial (2.5 mg) by nebulization every 6 hours as needed for shortness of breath / dyspnea or wheezing 25 vial 3     albuterol (PROAIR HFA/PROVENTIL HFA/VENTOLIN HFA) 108 (90 Base) MCG/ACT inhaler Inhale 2 puffs into the lungs every 6 hours as needed       budesonide-formoterol (SYMBICORT) 160-4.5 MCG/ACT Inhaler Inhale 2 puffs into the lungs 2 times daily 3 Inhaler 1     clindamycin (CLINDAMAX) 1 % external gel Apply topically 2 times daily 60 g 3     doxycycline hyclate (VIBRA-TABS) 100 MG tablet Take 1 tablet (100 mg) by mouth 2 times daily 90 tablet 0     fluconazole (DIFLUCAN) 150 MG tablet Take 1 tablet (150 mg) by mouth daily for 3 days 10 tablet 2     Fluticasone-Umeclidin-Vilanterol (TRELEGY ELLIPTA) 100-62.5-25 MCG/INH oral inhaler Inhale 1 puff into the lungs daily 1 Inhaler 0     hydrOXYzine (ATARAX) 50 MG tablet Take 1-2 tablets ( mg) by mouth 4 times daily as needed for anxiety Increased dose. For anxiety 180 tablet 1     tiotropium (SPIRIVA HANDIHALER) 18 MCG capsule Inhale contents of one capsule daily. 90 capsule 3     amLODIPine (NORVASC) 10 MG tablet Take 1 tablet (10 mg) by mouth daily (Patient not taking: Reported on 5/2/2019) 90 tablet 1     atorvastatin (LIPITOR) 80 MG tablet Take 1 tablet (80 mg) by mouth daily (Patient not taking:  Reported on 5/2/2019) 30 tablet 0     benzoyl peroxide (ACNE-CLEAR) 10 % external gel Apply topically 2 times daily 90 g 3     cetirizine HCl 10 MG CAPS Take 10 mg by mouth daily as needed Takes in the spring.       Cholecalciferol (VITAMIN D) 2000 units CAPS Take 2,000 Units by mouth daily (Patient not taking: Reported on 5/2/2019) 30 capsule 3     desonide (DESOWEN) 0.05 % cream Apply topically as needed        fluticasone (FLONASE) 50 MCG/ACT spray Spray 2 sprays in nostril       furosemide (LASIX) 20 MG tablet Take 1 tablet (20 mg) by mouth daily as needed (Patient not taking: Reported on 5/2/2019) 30 tablet 0     IBUPROFEN PO Take 600 mg by mouth every 6 hours as needed for moderate pain       lisinopril (PRINIVIL/ZESTRIL) 20 MG tablet Take 1 tablet (20 mg) by mouth daily (Patient not taking: Reported on 5/2/2019) 90 tablet 1     montelukast (SINGULAIR) 10 MG tablet Take 1 tablet (10 mg) by mouth At Bedtime 90 tablet 0     nicotine polacrilex (RA NICOTINE GUM) 2 MG gum Place 2 mg inside cheek as needed for smoking cessation       omeprazole 20 MG tablet Take 20 mg by mouth 2 times daily       order for DME Equipment being ordered:Pulse Oxymeter (Patient not taking: Reported on 5/2/2019) 1 Device 0     order for DME Equipment being ordered: Nebulizer (Patient not taking: Reported on 5/2/2019) 1 Units 0     ranitidine (ZANTAC) 75 MG tablet Take 75 mg by mouth daily          Soc Hx: reviewed  Fam Hx: reviewed          Roro Low MD  Internal Medicine

## 2019-06-11 NOTE — LETTER
[facility]  [address], [address]  [place] [address]  [phone]        June 13, 2019    Swetha Patterson  [address] [facility] [id number]            Dear Ms. Swetha Patterson:    These are the recent blood test results.  Your vitamin B12 and iron levels are normal.    Sincerely,    Roro Low MD  Internal Medicine

## 2019-06-12 LAB
FERRITIN SERPL-MCNC: 134 NG/ML (ref 8–252)
IRON SATN MFR SERPL: 15 % (ref 15–46)
IRON SERPL-MCNC: 53 UG/DL (ref 35–180)
TIBC SERPL-MCNC: 360 UG/DL (ref 240–430)
VIT B12 SERPL-MCNC: 495 PG/ML (ref 193–986)

## 2019-06-19 ENCOUNTER — HOSPITAL ENCOUNTER (OUTPATIENT)
Dept: NUCLEAR MEDICINE | Facility: CLINIC | Age: 49
Setting detail: NUCLEAR MEDICINE
End: 2019-06-19
Attending: INTERNAL MEDICINE
Payer: COMMERCIAL

## 2019-06-19 ENCOUNTER — HOSPITAL ENCOUNTER (OUTPATIENT)
Dept: CARDIOLOGY | Facility: CLINIC | Age: 49
Discharge: HOME | End: 2019-06-19
Attending: INTERNAL MEDICINE | Admitting: INTERNAL MEDICINE
Payer: COMMERCIAL

## 2019-06-19 DIAGNOSIS — R07.9 CHEST PAIN, UNSPECIFIED TYPE: ICD-10-CM

## 2019-06-19 PROCEDURE — A9502 TC99M TETROFOSMIN: HCPCS | Performed by: INTERNAL MEDICINE

## 2019-06-19 PROCEDURE — 93018 CV STRESS TEST I&R ONLY: CPT | Performed by: INTERNAL MEDICINE

## 2019-06-19 PROCEDURE — 25000128 H RX IP 250 OP 636

## 2019-06-19 PROCEDURE — 78452 HT MUSCLE IMAGE SPECT MULT: CPT | Mod: 26 | Performed by: INTERNAL MEDICINE

## 2019-06-19 PROCEDURE — 78452 HT MUSCLE IMAGE SPECT MULT: CPT

## 2019-06-19 PROCEDURE — 93016 CV STRESS TEST SUPVJ ONLY: CPT | Performed by: INTERNAL MEDICINE

## 2019-06-19 PROCEDURE — 34300033 ZZH RX 343: Performed by: INTERNAL MEDICINE

## 2019-06-19 PROCEDURE — 93017 CV STRESS TEST TRACING ONLY: CPT

## 2019-06-19 RX ORDER — REGADENOSON 0.08 MG/ML
INJECTION, SOLUTION INTRAVENOUS
Status: COMPLETED
Start: 2019-06-19 | End: 2019-06-19

## 2019-06-19 RX ADMIN — TETROFOSMIN 11 MCI.: 1.38 INJECTION, POWDER, LYOPHILIZED, FOR SOLUTION INTRAVENOUS at 11:37

## 2019-06-19 RX ADMIN — TETROFOSMIN 33 MCI.: 1.38 INJECTION, POWDER, LYOPHILIZED, FOR SOLUTION INTRAVENOUS at 13:20

## 2019-06-19 RX ADMIN — REGADENOSON 0.4 MG: 0.08 INJECTION, SOLUTION INTRAVENOUS at 13:11

## 2019-06-19 NOTE — LETTER
[facility]  [address], [address]  [place] [address]  [phone]        June 28, 2019    Swetha Patterson  [address] [facility] [address]            Dear Ms. Swetha Patterson:      The results of your recent stress test was normal.  If you have any further questions or problems, please contact our office.    Sincerely,        MAGGY Webb for Dr. Low    Results for orders placed or performed during the hospital encounter of 06/19/19   [facility] Lexiscan stress test    Narrative    GATED MYOCARDIAL PERFUSION SCINTIGRAPHY WITH INTRAVENOUS PHARMACOLOGIC  VASODILATATION LEXISCAN -ONE DAY STUDY     6/19/2019 2:34 PM  SWETHA PATTERSON  49 years  Female  1970.    Indication/Clinical History: Chest pain    Impression  1.  Myocardial perfusion imaging using single isotope technique  demonstrated normal myocardial perfusion.   2. Gated images demonstrated normal wall motion.  The left ventricular  systolic function is normal with a calculated ejection fraction of  79%.    Procedure  Pharmacologic stress testing was performed with Lexiscan at a rate of  0.08 mg/ml rapid bolus injection, for 15 seconds, 0.4 mg/5ml  intravenously. Low-level exercise was not performed along with the  vasodilator infusion.  The heart rate was 76 at baseline and sandeep to  104 beats per minute during the Lexiscan infusion. The rest blood  pressure was 134/83 mmHg and was 118/84 mm Hg during Lexiscan  infusion. The patient experienced shortness of breath  during the  test.    Myocardial perfusion imaging was performed at rest, approximately 45  minutes after the injection intravenously of 11 mCi of Tc-99m Myoview.  At peak pharmacologic effect, 10-20 seconds after Lexiscan,  the  patient was injected intravenously with 33 mCi of  Tc-99m Myoview. The  post-stress tomographic imaging was performed approximately 60 minutes  after stress.    EKG Findings  The resting EKG demonstrated sinus rhythm with small Q  waves in the  inferior leads which do not reach physiologic significance. The stress  EKG demonstrated no significant ST segment changes.    Tomographic Findings  Overall, the study quality is adequate. Body mass index 37 . On the  stress images, normal myocardial perfusion was seen. On the rest  images, myocardial perfusion was again present . Gated images  demonstrated normal wall motion. The left ventricular ejection  fraction was calculated to be 79%. TID was absent.    TACHO KHAN MD

## 2019-06-19 NOTE — PROGRESS NOTES
Pre-procedure:  Are you having any pain or shortness of breath (prior to starting)? Shortness of breath  Initial vital signs: /83, HR 70, RR 14  Allergies reviewed: sulfa   Rhythm: Sinus  Medications taken within 48 hours of procedure: none today   Any nitrates within the last 48 hours:no  Last Caffeine: none today  Lung sounds: CTA, no wheezing, crackles or rtx, left lung  expiratory wheezes AMRIT  Health History (COPD, Asthma, etc): both asthma and COPD           Procedure: Lexiscan  Reaction/symptoms after receiving Ella injection: Shortness of breath, extreme  Intensity of Pain: no pain  Rhythm: sinus  1. Vital Signs:/84, , RR 18  2. Vital Signs:/86, HR 96, RR 16     Reversal agent: N/A    Post:   Resolution of symptoms?: YES  Vital signs: /86, HR 88, RR 15  Vital signs: /88, HR 83, RR 14  Rhythm: sinus  Walk: NO  Comment: Patient stated she felt back to baseline. Denies any chest pain, but continues to have shortness of breath.   Return to Radiology

## 2019-06-24 ENCOUNTER — OFFICE VISIT (OUTPATIENT)
Dept: CARDIOLOGY | Facility: CLINIC | Age: 49
End: 2019-06-24
Payer: COMMERCIAL

## 2019-06-24 VITALS
DIASTOLIC BLOOD PRESSURE: 78 MMHG | HEART RATE: 69 BPM | WEIGHT: 220.7 LBS | BODY MASS INDEX: 37.68 KG/M2 | HEIGHT: 64 IN | SYSTOLIC BLOOD PRESSURE: 125 MMHG

## 2019-06-24 DIAGNOSIS — I10 HTN, GOAL BELOW 140/90: ICD-10-CM

## 2019-06-24 DIAGNOSIS — R00.0 TACHYCARDIA: ICD-10-CM

## 2019-06-24 DIAGNOSIS — R07.9 CHEST PAIN, UNSPECIFIED TYPE: Primary | ICD-10-CM

## 2019-06-24 DIAGNOSIS — R00.2 PALPITATIONS: ICD-10-CM

## 2019-06-24 PROCEDURE — 99214 OFFICE O/P EST MOD 30 MIN: CPT | Performed by: INTERNAL MEDICINE

## 2019-06-24 ASSESSMENT — MIFFLIN-ST. JEOR: SCORE: 1606.09

## 2019-06-24 NOTE — PROGRESS NOTES
HPI and Plan:   Today I had the pleasure of seeing Swetha Patterson at [facility] in [place]. She is a pleasant 49 year old patient with a past medical history of severe GERD, COPD, depression/anxiety, obesity, family history of premature coronary artery disease, hyperlipidemia who presents to the clinic for initial consultation for atypical chest pain.    The patient reports substernal, sharp, nonexertional chest pain that has been going on for last several years.  The patient reports being under a lot of stress.  She had an EKG which was normal and did not show any ST-T wave changes.  He also had a nuclear stress test which did not show any evidence of reversible myocardial ischemia.  She had a CT pulmonary embolism protocol in 12/2018.  Although the coronaries were not completely evaluated on that CT scan on my review I do not see any significant coronary artery disease.      An echocardiogram performed this year showed normal left ventricular systolic function with an ejection fraction of 55 to 60%.  No significant valvular disease.  Grade 2 diastolic dysfunction.  She had also undergone Holter monitor which did not show any significant ventricular arrhythmias.  He did show 8 episodes of SVT which did not correlate with her symptoms.    Assessment and plan    1.  Atypical chest pain   The patient has undergone extensive work-up which is included echocardiogram, and nuclear stress test all of which has been normal.  I provided reassurance to the patient.  I told her that we need to monitor her symptoms and reevaluate in a year.  If her symptoms continue I will consider performing the form of stress test versus coronary angiogram.    2.  Hyperlipidemia  Patient's last LDL was Significantly elevated at 176 mg/dL and she was started on high dose statin appropriately.  We will continue statin and reevaluate her fasting lipid profile in 3 to 6 months.  This can be done in the primary care set  up.    Plan  Work-up noncardiac causes of chest pain  I will have her come back and see me in a year    Thank you for allowing me to participate in the care of Swetha MALVIN Tobin MD  Cardiology    Medications Discontinued During This Encounter   Medication Reason     atorvastatin (LIPITOR) 80 MG tablet Medication Reconciliation Clean Up     fluconazole (DIFLUCAN) 150 MG tablet Therapy completed     magnesium 250 MG tablet Stopped by Patient     order for DME Medication Reconciliation Clean Up     order for DME Medication Reconciliation Clean Up     Cholecalciferol (VITAMIN D) 2000 units CAPS Medication Reconciliation Clean Up       Encounter Diagnoses   Name Primary?     Chest pain, unspecified type Yes     Tachycardia      Palpitations      HTN, goal below 140/90        CURRENT MEDICATIONS:  Current Outpatient Medications   Medication Sig Dispense Refill     albuterol (2.5 MG/3ML) 0.083% neb solution Take 1 vial (2.5 mg) by nebulization every 6 hours as needed for shortness of breath / dyspnea or wheezing 25 vial 3     albuterol (PROAIR HFA/PROVENTIL HFA/VENTOLIN HFA) 108 (90 Base) MCG/ACT inhaler Inhale 2 puffs into the lungs every 6 hours as needed for shortness of breath / dyspnea 18 g 3     amLODIPine (NORVASC) 10 MG tablet Take 1 tablet (10 mg) by mouth daily 90 tablet 1     atorvastatin (LIPITOR) 80 MG tablet Take 1 tablet (80 mg) by mouth daily 90 tablet 1     budesonide-formoterol (SYMBICORT) 160-4.5 MCG/ACT Inhaler Inhale 2 puffs into the lungs 2 times daily 3 Inhaler 1     cetirizine HCl 10 MG CAPS Take 10 mg by mouth daily as needed Takes in the spring.       clindamycin (CLINDAMAX) 1 % external gel Apply topically 2 times daily 60 g 3     desonide (DESOWEN) 0.05 % cream Apply topically as needed        doxycycline hyclate (VIBRA-TABS) 100 MG tablet Take 1 tablet (100 mg) by mouth 2 times daily 90 tablet 0     fluticasone (FLONASE) 50 MCG/ACT spray Spray 2 sprays in nostril        Fluticasone-Umeclidin-Vilanterol (TRELEGY ELLIPTA) 100-62.5-25 MCG/INH oral inhaler Inhale 1 puff into the lungs daily 1 Inhaler 0     furosemide (LASIX) 20 MG tablet Take 1 tablet (20 mg) by mouth daily 90 tablet 1     hydrOXYzine (ATARAX) 50 MG tablet Take 1-2 tablets ( mg) by mouth 4 times daily as needed for anxiety Increased dose. For anxiety 180 tablet 1     IBUPROFEN PO Take 600 mg by mouth every 6 hours as needed for moderate pain       lisinopril (PRINIVIL/ZESTRIL) 20 MG tablet Take 1 tablet (20 mg) by mouth daily 90 tablet 1     montelukast (SINGULAIR) 10 MG tablet Take 1 tablet (10 mg) by mouth At Bedtime 90 tablet 0     nicotine polacrilex (RA NICOTINE GUM) 2 MG gum Place 2 mg inside cheek as needed for smoking cessation       nitroGLYcerin (NITROSTAT) 0.4 MG sublingual tablet For chest pain place 1 tablet under the tongue every 5 minutes for 3 doses. If symptoms persist 5 minutes after 1st dose call 911. 25 tablet 0     omeprazole 20 MG tablet Take 20 mg by mouth 2 times daily       pramipexole (MIRAPEX) 0.125 MG tablet Take 1-2 tablets (0.125-0.25 mg) by mouth nightly as needed (RLS) 60 tablet 3     ranitidine (ZANTAC) 75 MG tablet Take 75 mg by mouth daily        tiotropium (SPIRIVA HANDIHALER) 18 MCG capsule Inhale contents of one capsule daily. 90 capsule 3     vitamin D3 (CHOLECALCIFEROL) 15748 units capsule Take 1 capsule (50,000 Units) by mouth every 7 days 13 capsule 0     benzoyl peroxide (ACNE-CLEAR) 10 % external gel Apply topically 2 times daily 90 g 3       ALLERGIES     Allergies   Allergen Reactions     Codeine Nausea and Vomiting     vomiting     Cyclobenzaprine Nausea     Hydrocodone Nausea and Vomiting     Sulfa Drugs Nausea and Vomiting     Nausea     Gabapentin Rash       PAST MEDICAL HISTORY:  Past Medical History:   Diagnosis Date     Anxiety state, unspecified      Asthma      Chronic pain     back pain from cyst     Contact dermatitis and other eczema, due to unspecified  "cause      COPD (chronic obstructive pulmonary disease) (H)      Depressive disorder, not elsewhere classified      Emphysema with chronic bronchitis (H)      Esophageal reflux      Family history of ischemic heart disease      Gastro-oesophageal reflux disease      History of emphysema      Hoarseness      HTN, goal below 140/90     No cardilogist     Hyperlipidemia LDL goal <130      Liver disease     \"fatty liver\"     Other chronic pain     chest, from coughing     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)      PONV (postoperative nausea and vomiting)        PAST SURGICAL HISTORY:  Past Surgical History:   Procedure Laterality Date     ARTHROSCOPY SHOULDER DECOMPRESSION Left 10/21/2015    Procedure: ARTHROSCOPY SHOULDER DECOMPRESSION;  Surgeon: Julien Milian MD;  Location: [facility]     BRONCHIAL THERMOPLASTY N/A 11/14/2014    Procedure: BRONCHIAL THERMOPLASTY;  Surgeon: Ward Whitaker MD;  Location: [facility]     BRONCHIAL THERMOPLASTY N/A 12/19/2014    Procedure: BRONCHIAL THERMOPLASTY;  Surgeon: Ward Whitaker MD;  Location: [facility]     BRONCHIAL THERMOPLASTY N/A 2/6/2015    Procedure: BRONCHIAL THERMOPLASTY;  Surgeon: Ward Whitaker MD;  Location: [facility]     C APPENDECTOMY  at age 17     COLONOSCOPY N/A 11/26/2018    Procedure: COLONOSCOPY ([facility]);  Surgeon: Marshall Oakes MD;  Location: [facility] OR     DISCECTOMY, FUSION CERVICAL ANTERIOR ONE LEVEL, COMBINED N/A 5/1/2018    Procedure: COMBINED DISCECTOMY, FUSION CERVICAL ANTERIOR ONE LEVEL;  1.  C5-C6 anterior cervical diskectomy and fusion.    2.  C5-C6 application of intervertebral biomechanical device for interbody fusion purposes.    3.  C5-C6 anterior instrumentation using the standard Kristin InViZia 24 mm plate with four associated bone screws, 12 mm in length.  Inferior screws are fixed.  Superior screws are va     EXCISE NODE MEDIASTINAL  4/26/2013    Procedure: EXCISE NODE MEDIASTINAL;;  Surgeon: Av Peña MD;  " Location: [facility]     LAPAROSCOPIC CHOLECYSTECTOMY N/A 10/19/2017    Procedure: LAPAROSCOPIC CHOLECYSTECTOMY;  LAPAROSCOPIC CHOLECYSTECTOMY;  Surgeon: Ney Jerry MD;  Location: [facility]     THORACOSCOPY  4/26/2013    Procedure: THORACOSCOPY;  LEFT VIDEO ASSISTED THORACOSCOPY, RESECTION OF POSTERIOR MEDIASTINAL MASS;  Surgeon: Av Peña MD;  Location: [facility] OR     TONSILLECTOMY  as a kid     TONSILLECTOMY         FAMILY HISTORY:  Family History   Problem Relation Age of Onset     Heart Disease Mother         murmur, mi     Hypertension Mother      Cerebrovascular Disease Mother      Depression Mother      Gallbladder Disease Mother      Asthma Mother      Family History Negative Father         does not know  him     Family History Negative Brother      Heart Disease Maternal Grandfather      Asthma Maternal Grandfather      Hypertension Maternal Grandfather      Cerebrovascular Disease Maternal Grandfather      Diabetes Maternal Grandfather      Asthma Sister      Hypertension Sister      Cerebrovascular Disease Sister      Hypertension Maternal Grandmother      Cerebrovascular Disease Maternal Grandmother        SOCIAL HISTORY:  Social History     Socioeconomic History     Marital status:      Spouse name: None     Number of children: None     Years of education: None     Highest education level: None   Occupational History     None   Social Needs     Financial resource strain: None     Food insecurity:     Worry: None     Inability: None     Transportation needs:     Medical: None     Non-medical: None   Tobacco Use     Smoking status: Light Tobacco Smoker     Years: 30.00     Types: Cigarettes     Last attempt to quit: 9/1/2015     Years since quitting: 3.8     Smokeless tobacco: Never Used     Tobacco comment: off/on 5 cigs per day   Substance and Sexual Activity     Alcohol use: No     Alcohol/week: 0.0 oz     Drug use: No     Sexual activity: Yes     Partners: Male     Birth  "control/protection: None   Lifestyle     Physical activity:     Days per week: None     Minutes per session: None     Stress: None   Relationships     Social connections:     Talks on phone: None     Gets together: None     Attends Rastafarian service: None     Active member of club or organization: None     Attends meetings of clubs or organizations: None     Relationship status: None     Intimate partner violence:     Fear of current or ex partner: None     Emotionally abused: None     Physically abused: None     Forced sexual activity: None   Other Topics Concern      Service Not Asked     Blood Transfusions Not Asked     Caffeine Concern No     Comment: 4-5 cans of pop daily     Occupational Exposure Not Asked     Hobby Hazards Not Asked     Sleep Concern Not Asked     Stress Concern Not Asked     Weight Concern Not Asked     Special Diet No     Back Care Not Asked     Exercise No     Comment: on hold     Bike Helmet Not Asked     Seat Belt Not Asked     Self-Exams Not Asked     Parent/sibling w/ CABG, MI or angioplasty before 65F 55M? Not Asked   Social History Narrative     None       Review of Systems:  Skin:  Negative       Eyes:  Negative      ENT:  Negative      Respiratory:  Positive for dyspnea on exertion;cough;wheezing asthma   Cardiovascular:    Positive for;chest pain;palpitations    Gastroenterology: Negative      Genitourinary:  Negative      Musculoskeletal:  Positive for   leg/feet pain  Neurologic:  Positive for headaches    Psychiatric:  Positive for anxiety    Heme/Lymph/Imm:  Positive for allergies    Endocrine:  Negative        Physical Exam:  Vitals: /78   Pulse 69   Ht 1.626 m (5' 4\")   Wt 100.1 kg (220 lb 11.2 oz)   LMP 04/15/2016 (Exact Date)   BMI 37.88 kg/m    Constitutional:         Skin:           Head:         Eyes:         Lymph:    ENT:         Neck:         Respiratory:          Cardiac:                                                         GI:       "   Extremities and Muscular Skeletal:               Neurological:         Psych:         Recent Lab Results:  LIPID RESULTS:  Lab Results   Component Value Date    CHOL 266 (H) 05/10/2019    HDL 50 05/10/2019     (H) 05/10/2019    TRIG 200 (H) 05/10/2019    CHOLHDLRATIO 4.9 05/15/2015       LIVER ENZYME RESULTS:  Lab Results   Component Value Date    AST 29 05/10/2019    ALT 47 05/10/2019       CBC RESULTS:  Lab Results   Component Value Date    WBC 7.9 05/10/2019    RBC 4.94 05/10/2019    HGB 14.4 05/10/2019    HCT 44.6 05/10/2019    MCV 90 05/10/2019    MCH 29.1 05/10/2019    MCHC 32.3 05/10/2019    RDW 14.4 05/10/2019     05/10/2019       BMP RESULTS:  Lab Results   Component Value Date     05/10/2019    POTASSIUM 4.2 05/10/2019    CHLORIDE 108 05/10/2019    CO2 25 05/10/2019    ANIONGAP 7 05/10/2019     (H) 05/10/2019    BUN 18 05/10/2019    CR 0.88 05/10/2019    GFRESTIMATED 77 05/10/2019    GFRESTBLACK 89 05/10/2019    GENE 9.4 05/10/2019        A1C RESULTS:  Lab Results   Component Value Date    A1C 5.6 05/10/2019       INR RESULTS:  Lab Results   Component Value Date    INR 0.90 03/27/2017    INR 0.92 04/26/2013       CC  Roro Chawla MD  [address] [place] [address]    All medical records were reviewed in detail and discussed with the patient. Greater than 30 mins were spent with the patient, 50% of this time was spent on counseling and coordination of care.  After visit summary was printed and given to the patient.

## 2019-06-24 NOTE — PATIENT INSTRUCTIONS
All the tests are negative for coronary artery disease.   Please have your PCP look into non cardiac causes of chest pain.   Patient Education     Uncertain Causes of Chest Pain    Chest pain can happen for a number of reasons. Sometimes the cause can't be determined. If your condition does not seem serious, and your pain does not appear to be coming from your heart, your healthcare provider may recommend watching it closely. Sometimes the signs of a serious problem take more time to appear. Many problems not related to your heart can cause chest pain. These include:    Musculoskeletal. Costochondritis is an inflammation of the tissues around the ribs that can occur from trauma or overuse injuries, or a strain of the muscles of the chest wall    Respiratory. Pneumonia, collapsed lung (pneumothorax), or inflammation of the lining of the chest and lungs (pleurisy)    Gastrointestinal. Esophageal reflux, heartburn, ulcers, or gallbladder disease    Anxiety and panic disorders    Nerve compression and inflammation    Rare miscellaneous problems such as aortic aneurysm (a swelling of the large artery coming out of the heart) or pulmonary embolism (a blood clot in the lungs)  Home care  After your visit, follow these recommendations:    Rest today and avoid strenuous activity.    Take any prescribed medicine as directed.    Be aware of any recurrent chest pain and notice any changes  Follow-up care  Follow up with your healthcare provider if you do not start to feel better within 24 hours, or as advised.  Call 911  Call 911 if any of these occur:    A change in the type of pain: if it feels different, becomes more severe, lasts longer, or begins to spread into your shoulder, arm, neck, jaw or back    Shortness of breath or increased pain with breathing    Weakness, dizziness, or fainting    Rapid heart beat    Crushing sensation in your chest  When to seek medical advice  Call your healthcare provider right away if any of  the following occur:    Cough with dark colored sputum (phlegm) or blood    Fever of 100.4 F (38 C) or higher, or as directed by your healthcare provider    Swelling, pain or redness in one leg  Date Last Reviewed: 5/1/2018 2000-2018 [email or web address]. [address] [place] [id number]. All rights reserved. This information is not intended as a substitute for professional medical care. Always follow your healthcare professional's instructions.           Patient Education     Uncertain Causes of Chest Pain    Chest pain can happen for a number of reasons. Sometimes the cause can't be determined. If your condition does not seem serious, and your pain does not appear to be coming from your heart, your healthcare provider may recommend watching it closely. Sometimes the signs of a serious problem take more time to appear. Many problems not related to your heart can cause chest pain. These include:    Musculoskeletal. Costochondritis is an inflammation of the tissues around the ribs that can occur from trauma or overuse injuries, or a strain of the muscles of the chest wall    Respiratory. Pneumonia, collapsed lung (pneumothorax), or inflammation of the lining of the chest and lungs (pleurisy)    Gastrointestinal. Esophageal reflux, heartburn, ulcers, or gallbladder disease    Anxiety and panic disorders    Nerve compression and inflammation    Rare miscellaneous problems such as aortic aneurysm (a swelling of the large artery coming out of the heart) or pulmonary embolism (a blood clot in the lungs)  Home care  After your visit, follow these recommendations:    Rest today and avoid strenuous activity.    Take any prescribed medicine as directed.    Be aware of any recurrent chest pain and notice any changes  Follow-up care  Follow up with your healthcare provider if you do not start to feel better within 24 hours, or as advised.  Call 911  Call 911 if any of these occur:    A change in the type of  pain: if it feels different, becomes more severe, lasts longer, or begins to spread into your shoulder, arm, neck, jaw or back    Shortness of breath or increased pain with breathing    Weakness, dizziness, or fainting    Rapid heart beat    Crushing sensation in your chest  When to seek medical advice  Call your healthcare provider right away if any of the following occur:    Cough with dark colored sputum (phlegm) or blood    Fever of 100.4 F (38 C) or higher, or as directed by your healthcare provider    Swelling, pain or redness in one leg  Date Last Reviewed: 5/1/2018 2000-2018 [employer]. [address] [place] [id number]. All rights reserved. This information is not intended as a substitute for professional medical care. Always follow your healthcare professional's instructions.           Patient Education     Uncertain Causes of Chest Pain    Chest pain can happen for a number of reasons. Sometimes the cause can't be determined. If your condition does not seem serious, and your pain does not appear to be coming from your heart, your healthcare provider may recommend watching it closely. Sometimes the signs of a serious problem take more time to appear. Many problems not related to your heart can cause chest pain. These include:    Musculoskeletal. Costochondritis is an inflammation of the tissues around the ribs that can occur from trauma or overuse injuries, or a strain of the muscles of the chest wall    Respiratory. Pneumonia, collapsed lung (pneumothorax), or inflammation of the lining of the chest and lungs (pleurisy)    Gastrointestinal. Esophageal reflux, heartburn, ulcers, or gallbladder disease    Anxiety and panic disorders    Nerve compression and inflammation    Rare miscellaneous problems such as aortic aneurysm (a swelling of the large artery coming out of the heart) or pulmonary embolism (a blood clot in the lungs)  Home care  After your visit, follow these  recommendations:    Rest today and avoid strenuous activity.    Take any prescribed medicine as directed.    Be aware of any recurrent chest pain and notice any changes  Follow-up care  Follow up with your healthcare provider if you do not start to feel better within 24 hours, or as advised.  Call 911  Call 911 if any of these occur:    A change in the type of pain: if it feels different, becomes more severe, lasts longer, or begins to spread into your shoulder, arm, neck, jaw or back    Shortness of breath or increased pain with breathing    Weakness, dizziness, or fainting    Rapid heart beat    Crushing sensation in your chest  When to seek medical advice  Call your healthcare provider right away if any of the following occur:    Cough with dark colored sputum (phlegm) or blood    Fever of 100.4 F (38 C) or higher, or as directed by your healthcare provider    Swelling, pain or redness in one leg  Date Last Reviewed: 5/1/2018 2000-2018 [employer]. [address] [place] [address]. All rights reserved. This information is not intended as a substitute for professional medical care. Always follow your healthcare professional's instructions.

## 2019-06-24 NOTE — LETTER
6/24/2019    Roro Chawla MD  [address] [facility] [address]    RE: Swetha Patterson       Dear Colleague,    I had the pleasure of seeing Swetha Patterson in the [facility].    HPI and Plan:   Today I had the pleasure of seeing Swetha Patterson at [facility] in [place]. She is a pleasant 49 year old patient with a past medical history of severe GERD, COPD, depression/anxiety, obesity, family history of premature coronary artery disease, hyperlipidemia who presents to the clinic for initial consultation for atypical chest pain.    The patient reports substernal, sharp, nonexertional chest pain that has been going on for last several years.  The patient reports being under a lot of stress.  She had an EKG which was normal and did not show any ST-T wave changes.  He also had a nuclear stress test which did not show any evidence of reversible myocardial ischemia.  She had a CT pulmonary embolism protocol in 12/2018.  Although the coronaries were not completely evaluated on that CT scan on my review I do not see any significant coronary artery disease.      An echocardiogram performed this year showed normal left ventricular systolic function with an ejection fraction of 55 to 60%.  No significant valvular disease.  Grade 2 diastolic dysfunction.  She had also undergone Holter monitor which did not show any significant ventricular arrhythmias.  He did show 8 episodes of SVT which did not correlate with her symptoms.    Assessment and plan    1.  Atypical chest pain   The patient has undergone extensive work-up which is included echocardiogram, and nuclear stress test all of which has been normal.  I provided reassurance to the patient.  I told her that we need to monitor her symptoms and reevaluate in a year.  If her symptoms continue I will consider performing the form of stress test versus coronary angiogram.    2.   Hyperlipidemia  Patient's last LDL was Significantly elevated at 176 mg/dL and she was started on high dose statin appropriately.  We will continue statin and reevaluate her fasting lipid profile in 3 to 6 months.  This can be done in the primary care set up.    Plan  Work-up noncardiac causes of chest pain  I will have her come back and see me in a year    Thank you for allowing me to participate in the care of Swetha A Leonardo Tobin MD  Cardiology    Medications Discontinued During This Encounter   Medication Reason     atorvastatin (LIPITOR) 80 MG tablet Medication Reconciliation Clean Up     fluconazole (DIFLUCAN) 150 MG tablet Therapy completed     magnesium 250 MG tablet Stopped by Patient     order for DME Medication Reconciliation Clean Up     order for DME Medication Reconciliation Clean Up     Cholecalciferol (VITAMIN D) 2000 units CAPS Medication Reconciliation Clean Up       Encounter Diagnoses   Name Primary?     Chest pain, unspecified type Yes     Tachycardia      Palpitations      HTN, goal below 140/90        CURRENT MEDICATIONS:  Current Outpatient Medications   Medication Sig Dispense Refill     albuterol (2.5 MG/3ML) 0.083% neb solution Take 1 vial (2.5 mg) by nebulization every 6 hours as needed for shortness of breath / dyspnea or wheezing 25 vial 3     albuterol (PROAIR HFA/PROVENTIL HFA/VENTOLIN HFA) 108 (90 Base) MCG/ACT inhaler Inhale 2 puffs into the lungs every 6 hours as needed for shortness of breath / dyspnea 18 g 3     amLODIPine (NORVASC) 10 MG tablet Take 1 tablet (10 mg) by mouth daily 90 tablet 1     atorvastatin (LIPITOR) 80 MG tablet Take 1 tablet (80 mg) by mouth daily 90 tablet 1     budesonide-formoterol (SYMBICORT) 160-4.5 MCG/ACT Inhaler Inhale 2 puffs into the lungs 2 times daily 3 Inhaler 1     cetirizine HCl 10 MG CAPS Take 10 mg by mouth daily as needed Takes in the spring.       clindamycin (CLINDAMAX) 1 % external gel Apply topically 2 times daily 60 g 3      desonide (DESOWEN) 0.05 % cream Apply topically as needed        doxycycline hyclate (VIBRA-TABS) 100 MG tablet Take 1 tablet (100 mg) by mouth 2 times daily 90 tablet 0     fluticasone (FLONASE) 50 MCG/ACT spray Spray 2 sprays in nostril       Fluticasone-Umeclidin-Vilanterol (TRELEGY ELLIPTA) 100-62.5-25 MCG/INH oral inhaler Inhale 1 puff into the lungs daily 1 Inhaler 0     furosemide (LASIX) 20 MG tablet Take 1 tablet (20 mg) by mouth daily 90 tablet 1     hydrOXYzine (ATARAX) 50 MG tablet Take 1-2 tablets ( mg) by mouth 4 times daily as needed for anxiety Increased dose. For anxiety 180 tablet 1     IBUPROFEN PO Take 600 mg by mouth every 6 hours as needed for moderate pain       lisinopril (PRINIVIL/ZESTRIL) 20 MG tablet Take 1 tablet (20 mg) by mouth daily 90 tablet 1     montelukast (SINGULAIR) 10 MG tablet Take 1 tablet (10 mg) by mouth At Bedtime 90 tablet 0     nicotine polacrilex (RA NICOTINE GUM) 2 MG gum Place 2 mg inside cheek as needed for smoking cessation       nitroGLYcerin (NITROSTAT) 0.4 MG sublingual tablet For chest pain place 1 tablet under the tongue every 5 minutes for 3 doses. If symptoms persist 5 minutes after 1st dose call 911. 25 tablet 0     omeprazole 20 MG tablet Take 20 mg by mouth 2 times daily       pramipexole (MIRAPEX) 0.125 MG tablet Take 1-2 tablets (0.125-0.25 mg) by mouth nightly as needed (RLS) 60 tablet 3     ranitidine (ZANTAC) 75 MG tablet Take 75 mg by mouth daily        tiotropium (SPIRIVA HANDIHALER) 18 MCG capsule Inhale contents of one capsule daily. 90 capsule 3     vitamin D3 (CHOLECALCIFEROL) 53025 units capsule Take 1 capsule (50,000 Units) by mouth every 7 days 13 capsule 0     benzoyl peroxide (ACNE-CLEAR) 10 % external gel Apply topically 2 times daily 90 g 3       ALLERGIES     Allergies   Allergen Reactions     Codeine Nausea and Vomiting     vomiting     Cyclobenzaprine Nausea     Hydrocodone Nausea and Vomiting     Sulfa Drugs Nausea and  "Vomiting     Nausea     Gabapentin Rash       PAST MEDICAL HISTORY:  Past Medical History:   Diagnosis Date     Anxiety state, unspecified      Asthma      Chronic pain     back pain from cyst     Contact dermatitis and other eczema, due to unspecified cause      COPD (chronic obstructive pulmonary disease) (H)      Depressive disorder, not elsewhere classified      Emphysema with chronic bronchitis (H)      Esophageal reflux      Family history of ischemic heart disease      Gastro-oesophageal reflux disease      History of emphysema      Hoarseness      HTN, goal below 140/90     No cardilogist     Hyperlipidemia LDL goal <130      Liver disease     \"fatty liver\"     Other chronic pain     chest, from coughing     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)      PONV (postoperative nausea and vomiting)        PAST SURGICAL HISTORY:  Past Surgical History:   Procedure Laterality Date     ARTHROSCOPY SHOULDER DECOMPRESSION Left 10/21/2015    Procedure: ARTHROSCOPY SHOULDER DECOMPRESSION;  Surgeon: Julien Milian MD;  Location: [facility] OR     BRONCHIAL THERMOPLASTY N/A 11/14/2014    Procedure: BRONCHIAL THERMOPLASTY;  Surgeon: Ward Whitaker MD;  Location: [facility]     BRONCHIAL THERMOPLASTY N/A 12/19/2014    Procedure: BRONCHIAL THERMOPLASTY;  Surgeon: Ward Whitaker MD;  [facility]     BRONCHIAL THERMOPLASTY N/A 2/6/2015    Procedure: BRONCHIAL THERMOPLASTY;  Surgeon: Ward Whitaker MD;  Location: [facility]     C APPENDECTOMY  at age 17     COLONOSCOPY N/A 11/26/2018    Procedure: COLONOSCOPY ([facility]);  Surgeon: Marshall Oakes MD;  Location: [facility] OR     DISCECTOMY, FUSION CERVICAL ANTERIOR ONE LEVEL, COMBINED N/A 5/1/2018    Procedure: COMBINED DISCECTOMY, FUSION CERVICAL ANTERIOR ONE LEVEL;  1.  C5-C6 anterior cervical diskectomy and fusion.    2.  C5-C6 application of intervertebral biomechanical device for interbody fusion purposes.    3.  C5-C6 anterior instrumentation using the standard " Kristin InViZia 24 mm plate with four associated bone screws, 12 mm in length.  Inferior screws are fixed.  Superior screws are va     EXCISE NODE MEDIASTINAL  4/26/2013    Procedure: EXCISE NODE MEDIASTINAL;;  Surgeon: Av Peña MD;  Location: [facility] OR     LAPAROSCOPIC CHOLECYSTECTOMY N/A 10/19/2017    Procedure: LAPAROSCOPIC CHOLECYSTECTOMY;  LAPAROSCOPIC CHOLECYSTECTOMY;  Surgeon: Ney Jerry MD;  Location: [facility] OR     THORACOSCOPY  4/26/2013    Procedure: THORACOSCOPY;  LEFT VIDEO ASSISTED THORACOSCOPY, RESECTION OF POSTERIOR MEDIASTINAL MASS;  Surgeon: Av Peña MD;  Location: [facility] OR     TONSILLECTOMY  as a kid     TONSILLECTOMY         FAMILY HISTORY:  Family History   Problem Relation Age of Onset     Heart Disease Mother         murmur, mi     Hypertension Mother      Cerebrovascular Disease Mother      Depression Mother      Gallbladder Disease Mother      Asthma Mother      Family History Negative Father         does not know  him     Family History Negative Brother      Heart Disease Maternal Grandfather      Asthma Maternal Grandfather      Hypertension Maternal Grandfather      Cerebrovascular Disease Maternal Grandfather      Diabetes Maternal Grandfather      Asthma Sister      Hypertension Sister      Cerebrovascular Disease Sister      Hypertension Maternal Grandmother      Cerebrovascular Disease Maternal Grandmother        SOCIAL HISTORY:  Social History     Socioeconomic History     Marital status:      Spouse name: None     Number of children: None     Years of education: None     Highest education level: None   Occupational History     None   Social Needs     Financial resource strain: None     Food insecurity:     Worry: None     Inability: None     Transportation needs:     Medical: None     Non-medical: None   Tobacco Use     Smoking status: Light Tobacco Smoker     Years: 30.00     Types: Cigarettes     Last attempt to quit: 9/1/2015     Years since  "quitting: 3.8     Smokeless tobacco: Never Used     Tobacco comment: off/on 5 cigs per day   Substance and Sexual Activity     Alcohol use: No     Alcohol/week: 0.0 oz     Drug use: No     Sexual activity: Yes     Partners: Male     Birth control/protection: None   Lifestyle     Physical activity:     Days per week: None     Minutes per session: None     Stress: None   Relationships     Social connections:     Talks on phone: None     Gets together: None     Attends Yazdanism service: None     Active member of club or organization: None     Attends meetings of clubs or organizations: None     Relationship status: None     Intimate partner violence:     Fear of current or ex partner: None     Emotionally abused: None     Physically abused: None     Forced sexual activity: None   Other Topics Concern      Service Not Asked     Blood Transfusions Not Asked     Caffeine Concern No     Comment: 4-5 cans of pop daily     Occupational Exposure Not Asked     Hobby Hazards Not Asked     Sleep Concern Not Asked     Stress Concern Not Asked     Weight Concern Not Asked     Special Diet No     Back Care Not Asked     Exercise No     Comment: on hold     Bike Helmet Not Asked     Seat Belt Not Asked     Self-Exams Not Asked     Parent/sibling w/ CABG, MI or angioplasty before 65F 55M? Not Asked   Social History Narrative     None       Review of Systems:  Skin:  Negative       Eyes:  Negative      ENT:  Negative      Respiratory:  Positive for dyspnea on exertion;cough;wheezing asthma   Cardiovascular:    Positive for;chest pain;palpitations    Gastroenterology: Negative      Genitourinary:  Negative      Musculoskeletal:  Positive for   leg/feet pain  Neurologic:  Positive for headaches    Psychiatric:  Positive for anxiety    Heme/Lymph/Imm:  Positive for allergies    Endocrine:  Negative        Physical Exam:  Vitals: /78   Pulse 69   Ht 1.626 m (5' 4\")   Wt 100.1 kg (220 lb 11.2 oz)   LMP 04/15/2016 (Exact " Date)   BMI 37.88 kg/m     Constitutional:         Skin:           Head:         Eyes:         Lymph:    ENT:         Neck:         Respiratory:          Cardiac:                                                         GI:         Extremities and Muscular Skeletal:               Neurological:         Psych:         Recent Lab Results:  LIPID RESULTS:  Lab Results   Component Value Date    CHOL 266 (H) 05/10/2019    HDL 50 05/10/2019     (H) 05/10/2019    TRIG 200 (H) 05/10/2019    CHOLHDLRATIO 4.9 05/15/2015       LIVER ENZYME RESULTS:  Lab Results   Component Value Date    AST 29 05/10/2019    ALT 47 05/10/2019       CBC RESULTS:  Lab Results   Component Value Date    WBC 7.9 05/10/2019    RBC 4.94 05/10/2019    HGB 14.4 05/10/2019    HCT 44.6 05/10/2019    MCV 90 05/10/2019    MCH 29.1 05/10/2019    MCHC 32.3 05/10/2019    RDW 14.4 05/10/2019     05/10/2019       BMP RESULTS:  Lab Results   Component Value Date     05/10/2019    POTASSIUM 4.2 05/10/2019    CHLORIDE 108 05/10/2019    CO2 25 05/10/2019    ANIONGAP 7 05/10/2019     (H) 05/10/2019    BUN 18 05/10/2019    CR 0.88 05/10/2019    GFRESTIMATED 77 05/10/2019    GFRESTBLACK 89 05/10/2019    GENE 9.4 05/10/2019        A1C RESULTS:  Lab Results   Component Value Date    A1C 5.6 05/10/2019       INR RESULTS:  Lab Results   Component Value Date    INR 0.90 03/27/2017    INR 0.92 04/26/2013       All medical records were reviewed in detail and discussed with the patient. Greater than 30 mins were spent with the patient, 50% of this time was spent on counseling and coordination of care.  After visit summary was printed and given to the patient.      Thank you for allowing me to participate in the care of your patient.    Sincerely,     Xander Tobin MD     [facility]

## 2019-07-09 ENCOUNTER — TELEPHONE (OUTPATIENT)
Dept: INTERNAL MEDICINE | Facility: CLINIC | Age: 49
End: 2019-07-09

## 2019-07-09 NOTE — TELEPHONE ENCOUNTER
Panel Management Review      Patient has the following on her problem list:     Depression / Dysthymia review    Measure:  Needs PHQ-9 score of 4 or less during index window.  Administer PHQ-9 and if score is 5 or more, send encounter to provider for next steps.    5 - 7 month window range: 5/10/19 through 9/10/19    PHQ-9 SCORE 1/10/2019 5/2/2019 5/2/2019   PHQ-9 Total Score - - -   PHQ-9 Total Score MyChart 12 (Moderate depression) - 22 (Severe depression)   PHQ-9 Total Score 12 22 27       If PHQ-9 recheck is 5 or more, route to provider for next steps.    Patient is due for:  PHQ9    Hypertension   Last three blood pressure readings:  BP Readings from Last 3 Encounters:   06/24/19 125/78   06/11/19 106/74   05/02/19 128/82     Blood pressure: Passed    HTN Guidelines:  Less than 140/90      Composite cancer screening  Chart review shows that this patient is due/due soon for the following Pap Smear and Mammogram  Summary:    Patient is due/failing the following:   -Mammogram  -Pap smear (due for routine physical)  -Asthma Action Plan (although asthma has been removed from her problem list)  -Need repeat PHQ9 due to failing score 1/2019. She did have another PHQ9 completed in May 2019, but this was about one week too early.    Action needed:   -AAP completed.   -Needs to complete PHQ9  -Needs to schedule appointments for routine physical with pap smear  -Needs mammogram    Type of outreach:    Letter sent. Will not attempt to call her since this has been discussed with her repeatedly and she still has not done these. She already comes for appointments regularly with Dr. Low-we will complete PHQ9 at her next visit.     Questions for provider review:    None                                                                                                                                    Carolyn Villarreal [facility]       Chart not routed.

## 2019-07-09 NOTE — LETTER
My Asthma Action Plan  Name: Swetha Patterson   YOB: 1970  Date: 7/9/2019   My doctor: Roro Chawla MD   My clinic: [facility]        My Control Medicine: Budesonide + formoterol (Symbicort) -  160/4.5 mcg two puffs two times per day  Montelukast (Singulair) -  10 mg per evening  Tiotropium (Spiriva) -  Handihaler 18 mcg per day  Trelegy Ellipta (Fluticasone-Umeclidin-Vilanterol 100-62.5-25) one puff per day  My Rescue Medicine: Albuterol nebulizer solution every 6 hours as needed  Albuterol (Proair/Ventolin/Proventil) inhaler two puffs every 6 hours as needed  My Oral Steroid Medicine: Prednisone-takes as directed My Asthma Severity: moderate persistent  Avoid your asthma triggers: Patient is unaware of triggers               GREEN ZONE   Good Control    I feel good    No cough or wheeze    Can work, sleep and play without asthma symptoms       Take your asthma control medicine every day.     1. If exercise triggers your asthma, take your rescue medication    15 minutes before exercise or sports, and    During exercise if you have asthma symptoms  2. Spacer to use with inhaler: If you have a spacer, make sure to use it with your inhaler             YELLOW ZONE Getting Worse  I have ANY of these:    I do not feel good    Cough or wheeze    Chest feels tight    Wake up at night   1. Keep taking your Green Zone medications  2. Start taking your rescue medicine:    every 20 minutes for up to 1 hour. Then every 4 hours for 24-48 hours.  3. If you stay in the Yellow Zone for more than 12-24 hours, contact your doctor.  4. If you do not return to the Green Zone in 12-24 hours or you get worse, start taking your oral steroid medicine if prescribed by your provider.           RED ZONE Medical Alert - Get Help  I have ANY of these:    I feel awful    Medicine is not helping    Breathing getting harder    Trouble walking or talking    Nose opens wide to breathe       1. Take  your rescue medicine NOW  2. If your provider has prescribed an oral steroid medicine, start taking it NOW  3. Call your doctor NOW  4. If you are still in the Red Zone after 20 minutes and you have not reached your doctor:    Take your rescue medicine again and    Call 911 or go to the emergency room right away    See your regular doctor within 2 weeks of an Emergency Room or Urgent Care visit for follow-up treatment.          Annual Reminders:  Meet with Asthma Educator,  Flu Shot in the Fall, consider Pneumonia Vaccination for patients with asthma (aged 19 and older).    Pharmacy:    [place], [place] - [address]  [facility] [id number] [place] [address] [address] [facility] AT [address] [place] [address] & [facility] [address] [address]                      Asthma Triggers  How To Control Things That Make Your Asthma Worse    Triggers are things that make your asthma worse.  Look at the list below to help you find your triggers and what you can do about them.  You can help prevent asthma flare-ups by staying away from your triggers.      Trigger                                                          What you can do   Cigarette Smoke  Tobacco smoke can make asthma worse. Do not allow smoking in your home, car or around you.  Be sure no one smokes at a child s day care or school.  If you smoke, ask your health care provider for ways to help you quit.  Ask family members to quit too.  Ask your health care provider for a referral to Quit Plan to help you quit smoking, or call 7-158-752-PLAN.     Colds, Flu, Bronchitis  These are common triggers of asthma. Wash your hands often.  Don t touch your eyes, nose or mouth.  Get a flu shot every year.     Dust Mites  These are tiny bugs that live in cloth or carpet. They are too small to see. Wash sheets and blankets in hot  water every week.   Encase pillows and mattress in dust mite proof covers.  Avoid having carpet if you can. If you have carpet, vacuum weekly.   Use a dust mask and HEPA vacuum.   Pollen and Outdoor Mold  Some people are allergic to trees, grass, or weed pollen, or molds. Try to keep your windows closed.  Limit time out doors when pollen count is high.   Ask you health care provider about taking medicine during allergy season.     Animal Dander  Some people are allergic to skin flakes, urine or saliva from pets with fur or feathers. Keep pets with fur or feathers out of your home.    If you can t keep the pet outdoors, then keep the pet out of your bedroom.  Keep the bedroom door closed.  Keep pets off cloth furniture and away from stuffed toys.     Mice, Rats, and Cockroaches  Some people are allergic to the waste from these pests.   Cover food and garbage.  Clean up spills and food crumbs.  Store grease in the refrigerator.   Keep food out of the bedroom.   Indoor Mold  This can be a trigger if your home has high moisture. Fix leaking faucets, pipes, or other sources of water.   Clean moldy surfaces.  Dehumidify basement if it is damp and smelly.   Smoke, Strong Odors, and Sprays  These can reduce air quality. Stay away from strong odors and sprays, such as perfume, powder, hair spray, paints, smoke incense, paint, cleaning products, candles and new carpet.   Exercise or Sports  Some people with asthma have this trigger. Be active!  Ask your doctor about taking medicine before sports or exercise to prevent symptoms.    Warm up for 5-10 minutes before and after sports or exercise.     Other Triggers of Asthma  Cold air:  Cover your nose and mouth with a scarf.  Sometimes laughing or crying can be a trigger.  Some medicines and food can trigger asthma.

## 2019-08-23 ENCOUNTER — TRANSFERRED RECORDS (OUTPATIENT)
Dept: HEALTH INFORMATION MANAGEMENT | Facility: CLINIC | Age: 49
End: 2019-08-23

## 2019-09-01 DIAGNOSIS — J30.89 DUST ALLERGY: ICD-10-CM

## 2019-09-01 DIAGNOSIS — E55.9 VITAMIN D DEFICIENCY: ICD-10-CM

## 2019-09-01 DIAGNOSIS — J44.9 SEVERE CHRONIC OBSTRUCTIVE PULMONARY DISEASE (H): Chronic | ICD-10-CM

## 2019-09-04 NOTE — TELEPHONE ENCOUNTER
Vitamin D  Routing refill request to provider for review/approval because:  High dose vitamin D not orders      Montelukast  Routing refill request to provider for review/approval because:  ACT score out of range

## 2019-09-04 NOTE — TELEPHONE ENCOUNTER
"Requested Prescriptions   Pending Prescriptions Disp Refills     D3-50 71419 units capsule [Pharmacy Med Name: VITAMIN D3 50,000 U   Last Written Prescription Date:  6/11/2019  Last Fill Quantity: 13,  # refills: 0   Last office visit: 6/11/2019 with prescribing provider:     Future Office Visit:   (CHOLECALCIFEROL)] 13 capsule 0     Sig: TAKE ONE CAPSULE BY MOUTH EVERY 7 DAYS  (AFTER THIS PRESCRIPTION, PATIENT TO CONTINUE WITH VITAMIN D3 2,000 UNITS) OVER THE COUNTER       Vitamin Supplements (Adult) Protocol Failed - 9/1/2019  2:58 PM        Failed - High dose Vitamin D not ordered        Passed - Recent (12 mo) or future (30 days) visit within the authorizing provider's specialty     Patient had office visit in the last 12 months or has a visit in the next 30 days with authorizing provider or within the authorizing provider's specialty.  See \"Patient Info\" tab in inbasket, or \"Choose Columns\" in Meds & Orders section of the refill encounter.              Passed - Medication is active on med list        montelukast (SINGULAIR) 10 MG tablet [Pharmacy Med Name: MONTELUKAST  Last Written Prescription Date:  5/2/2019  Last Fill Quantity: 90,  # refills: 0   Last office visit: 6/11/2019 with prescribing provider:     Future Office Visit:   SODIUM 10MG TABS] 90 tablet 0     Sig: TAKE ONE TABLET BY MOUTH AT BEDTIME       Leukotriene Inhibitors Protocol Failed - 9/1/2019  2:58 PM        Failed - Asthma control assessment score within normal limits in last 6 months     Please review ACT score.           Passed - Patient is age 12 or older     If patient is under 16, ok to refill using age based dosing.           Passed - Medication is active on med list        Passed - Recent (6 mo) or future (30 days) visit within the authorizing provider's specialty     Patient had office visit in the last 6 months or has a visit in the next 30 days with authorizing provider or within the authorizing provider's specialty.  See \"Patient " "Info\" tab in inbasket, or \"Choose Columns\" in Meds & Orders section of the refill encounter.            "

## 2019-09-05 RX ORDER — MONTELUKAST SODIUM 10 MG/1
TABLET ORAL
Qty: 90 TABLET | Refills: 0 | Status: SHIPPED | OUTPATIENT
Start: 2019-09-05 | End: 2019-12-12

## 2019-09-05 RX ORDER — METHOCARBAMOL 750 MG/1
TABLET ORAL
Qty: 13 CAPSULE | Refills: 0 | Status: SHIPPED | OUTPATIENT
Start: 2019-09-05 | End: 2020-04-13

## 2019-09-10 ENCOUNTER — NURSE TRIAGE (OUTPATIENT)
Dept: INTERNAL MEDICINE | Facility: CLINIC | Age: 49
End: 2019-09-10

## 2019-09-10 ENCOUNTER — TELEPHONE (OUTPATIENT)
Dept: INTERNAL MEDICINE | Facility: CLINIC | Age: 49
End: 2019-09-10

## 2019-09-10 ENCOUNTER — HOSPITAL ENCOUNTER (EMERGENCY)
Facility: CLINIC | Age: 49
Discharge: HOME | End: 2019-09-10
Attending: EMERGENCY MEDICINE | Admitting: EMERGENCY MEDICINE
Payer: COMMERCIAL

## 2019-09-10 VITALS
OXYGEN SATURATION: 95 % | RESPIRATION RATE: 16 BRPM | TEMPERATURE: 98.4 F | SYSTOLIC BLOOD PRESSURE: 143 MMHG | DIASTOLIC BLOOD PRESSURE: 85 MMHG | HEART RATE: 76 BPM

## 2019-09-10 DIAGNOSIS — J44.1 COPD EXACERBATION (H): ICD-10-CM

## 2019-09-10 DIAGNOSIS — K92.0 HEMATEMESIS, PRESENCE OF NAUSEA NOT SPECIFIED: ICD-10-CM

## 2019-09-10 DIAGNOSIS — J44.9 SEVERE CHRONIC OBSTRUCTIVE PULMONARY DISEASE (H): Chronic | ICD-10-CM

## 2019-09-10 DIAGNOSIS — R05.9 COUGH: Primary | ICD-10-CM

## 2019-09-10 LAB
ALBUMIN SERPL-MCNC: 3.8 G/DL (ref 3.4–5)
ALP SERPL-CCNC: 147 U/L (ref 40–150)
ALT SERPL W P-5'-P-CCNC: 41 U/L (ref 0–50)
ANION GAP SERPL CALCULATED.3IONS-SCNC: 7 MMOL/L (ref 3–14)
AST SERPL W P-5'-P-CCNC: 20 U/L (ref 0–45)
BASE EXCESS BLDV CALC-SCNC: 1.9 MMOL/L
BILIRUB SERPL-MCNC: 0.2 MG/DL (ref 0.2–1.3)
BUN SERPL-MCNC: 16 MG/DL (ref 7–30)
CALCIUM SERPL-MCNC: 10 MG/DL (ref 8.5–10.1)
CHLORIDE SERPL-SCNC: 105 MMOL/L (ref 94–109)
CO2 SERPL-SCNC: 27 MMOL/L (ref 20–32)
CREAT SERPL-MCNC: 0.77 MG/DL (ref 0.52–1.04)
ERYTHROCYTE [DISTWIDTH] IN BLOOD BY AUTOMATED COUNT: 14.5 % (ref 10–15)
GFR SERPL CREATININE-BSD FRML MDRD: >90 ML/MIN/{1.73_M2}
GLUCOSE SERPL-MCNC: 90 MG/DL (ref 70–99)
HCG SERPL QL: NEGATIVE
HCO3 BLDV-SCNC: 27 MMOL/L (ref 21–28)
HCT VFR BLD AUTO: 49.5 % (ref 35–47)
HGB BLD-MCNC: 15.5 G/DL (ref 11.7–15.7)
LIPASE SERPL-CCNC: 117 U/L (ref 73–393)
MCH RBC QN AUTO: 28.4 PG (ref 26.5–33)
MCHC RBC AUTO-ENTMCNC: 31.3 G/DL (ref 31.5–36.5)
MCV RBC AUTO: 91 FL (ref 78–100)
O2/TOTAL GAS SETTING VFR VENT: ABNORMAL %
OXYHGB MFR BLDV: 83 %
PCO2 BLDV: 45 MM HG (ref 40–50)
PH BLDV: 7.4 PH (ref 7.32–7.43)
PLATELET # BLD AUTO: 472 10E9/L (ref 150–450)
PO2 BLDV: 51 MM HG (ref 25–47)
POTASSIUM SERPL-SCNC: 3.8 MMOL/L (ref 3.4–5.3)
PROT SERPL-MCNC: 8.1 G/DL (ref 6.8–8.8)
RBC # BLD AUTO: 5.45 10E12/L (ref 3.8–5.2)
SODIUM SERPL-SCNC: 139 MMOL/L (ref 133–144)
WBC # BLD AUTO: 14.9 10E9/L (ref 4–11)

## 2019-09-10 PROCEDURE — 84703 CHORIONIC GONADOTROPIN ASSAY: CPT | Performed by: EMERGENCY MEDICINE

## 2019-09-10 PROCEDURE — 83690 ASSAY OF LIPASE: CPT | Performed by: EMERGENCY MEDICINE

## 2019-09-10 PROCEDURE — 25000128 H RX IP 250 OP 636: Performed by: EMERGENCY MEDICINE

## 2019-09-10 PROCEDURE — 25000125 ZZHC RX 250

## 2019-09-10 PROCEDURE — 99284 EMERGENCY DEPT VISIT MOD MDM: CPT | Mod: 25

## 2019-09-10 PROCEDURE — C9113 INJ PANTOPRAZOLE SODIUM, VIA: HCPCS | Performed by: EMERGENCY MEDICINE

## 2019-09-10 PROCEDURE — 82805 BLOOD GASES W/O2 SATURATION: CPT | Performed by: EMERGENCY MEDICINE

## 2019-09-10 PROCEDURE — 25000132 ZZH RX MED GY IP 250 OP 250 PS 637: Performed by: EMERGENCY MEDICINE

## 2019-09-10 PROCEDURE — 96374 THER/PROPH/DIAG INJ IV PUSH: CPT

## 2019-09-10 PROCEDURE — 80053 COMPREHEN METABOLIC PANEL: CPT | Performed by: EMERGENCY MEDICINE

## 2019-09-10 PROCEDURE — 94640 AIRWAY INHALATION TREATMENT: CPT

## 2019-09-10 PROCEDURE — 85027 COMPLETE CBC AUTOMATED: CPT | Performed by: EMERGENCY MEDICINE

## 2019-09-10 PROCEDURE — 84484 ASSAY OF TROPONIN QUANT: CPT | Performed by: EMERGENCY MEDICINE

## 2019-09-10 PROCEDURE — 96375 TX/PRO/DX INJ NEW DRUG ADDON: CPT

## 2019-09-10 RX ORDER — LORAZEPAM 1 MG/1
1 TABLET ORAL ONCE
Status: COMPLETED | OUTPATIENT
Start: 2019-09-10 | End: 2019-09-10

## 2019-09-10 RX ORDER — BUDESONIDE AND FORMOTEROL FUMARATE DIHYDRATE 160; 4.5 UG/1; UG/1
2 AEROSOL RESPIRATORY (INHALATION) 2 TIMES DAILY
Qty: 1 INHALER | Refills: 0 | Status: SHIPPED | OUTPATIENT
Start: 2019-09-10 | End: 2020-02-20

## 2019-09-10 RX ORDER — AZITHROMYCIN 250 MG/1
TABLET, FILM COATED ORAL
Qty: 6 TABLET | Refills: 0 | Status: SHIPPED | OUTPATIENT
Start: 2019-09-10 | End: 2019-12-12

## 2019-09-10 RX ORDER — IPRATROPIUM BROMIDE AND ALBUTEROL SULFATE 2.5; .5 MG/3ML; MG/3ML
6 SOLUTION RESPIRATORY (INHALATION) ONCE
Status: COMPLETED | OUTPATIENT
Start: 2019-09-10 | End: 2019-09-10

## 2019-09-10 RX ORDER — METHYLPREDNISOLONE SODIUM SUCCINATE 125 MG/2ML
125 INJECTION, POWDER, LYOPHILIZED, FOR SOLUTION INTRAMUSCULAR; INTRAVENOUS ONCE
Status: COMPLETED | OUTPATIENT
Start: 2019-09-10 | End: 2019-09-10

## 2019-09-10 RX ORDER — IPRATROPIUM BROMIDE AND ALBUTEROL SULFATE 2.5; .5 MG/3ML; MG/3ML
SOLUTION RESPIRATORY (INHALATION)
Status: COMPLETED
Start: 2019-09-10 | End: 2019-09-10

## 2019-09-10 RX ORDER — PREDNISONE 20 MG/1
TABLET ORAL
Qty: 20 TABLET | Refills: 0 | Status: SHIPPED | OUTPATIENT
Start: 2019-09-10 | End: 2019-12-12

## 2019-09-10 RX ORDER — KETOROLAC TROMETHAMINE 15 MG/ML
15 INJECTION, SOLUTION INTRAMUSCULAR; INTRAVENOUS ONCE
Status: COMPLETED | OUTPATIENT
Start: 2019-09-10 | End: 2019-09-10

## 2019-09-10 RX ORDER — BENZONATATE 200 MG/1
200 CAPSULE ORAL 3 TIMES DAILY PRN
Qty: 12 CAPSULE | Refills: 0 | Status: SHIPPED | OUTPATIENT
Start: 2019-09-10 | End: 2019-12-12

## 2019-09-10 RX ADMIN — IPRATROPIUM BROMIDE AND ALBUTEROL SULFATE 6 ML: .5; 3 SOLUTION RESPIRATORY (INHALATION) at 11:46

## 2019-09-10 RX ADMIN — KETOROLAC TROMETHAMINE 15 MG: 15 INJECTION, SOLUTION INTRAMUSCULAR; INTRAVENOUS at 13:26

## 2019-09-10 RX ADMIN — LORAZEPAM 1 MG: 1 TABLET ORAL at 12:13

## 2019-09-10 RX ADMIN — PANTOPRAZOLE SODIUM 40 MG: 40 INJECTION, POWDER, FOR SOLUTION INTRAVENOUS at 12:09

## 2019-09-10 RX ADMIN — METHYLPREDNISOLONE SODIUM SUCCINATE 125 MG: 125 INJECTION, POWDER, FOR SOLUTION INTRAMUSCULAR; INTRAVENOUS at 12:09

## 2019-09-10 RX ADMIN — IPRATROPIUM BROMIDE AND ALBUTEROL SULFATE 6 ML: 2.5; .5 SOLUTION RESPIRATORY (INHALATION) at 11:46

## 2019-09-10 ASSESSMENT — ENCOUNTER SYMPTOMS
ROS GI COMMENTS: HEMATEMESIS
UNEXPECTED WEIGHT CHANGE: 0
COUGH: 1
VOMITING: 1
BLOOD IN STOOL: 0
SHORTNESS OF BREATH: 1

## 2019-09-10 NOTE — TELEPHONE ENCOUNTER
Patient complains of having a URI for over 1 week with productive cough of small amounts of clear phlegm, afebrile.  Tried to eat some toast and pop last evening, became nauseated and had emesis x2 .  First time had what looked like streaks of blood in vomitus and vomited a second time with  red vomitus.  She has been feeling clammy and sweaty since last evening with weakness and fatigue.  Having epigastric pain.  States she had recent GI work up and nothing was found.  Last BM today, soft, brown.  Advised patient to be evaluated in ER, she states she will call her care coordinator who can in turn call a cab for patient.      Scheduled for Thursday with primary care provider for follow up..  SERJIO Chandra R.N.

## 2019-09-10 NOTE — ED PROVIDER NOTES
History     Chief Complaint:  Cough and Hematemesis    The history is provided by the patient.      Swetha Patterson is a 49 year old female with a history of COPD who presents to the emergency department today for evaluation of cough and hematemesis. The patient reports approximately a week ago she started to have a cough and since then has been experiencing increased shortness of breath. She reports experiencing the shortness of breath with exertion and when lying down. She says she's also had chest pain localized in the center of her chest which has been constant and worsening over the past four days. She reports getting minimal sleep with her illness. Last night after eating toast the patient reports vomiting, in the first she noticed streaks of blood and the second time she vomited it was just blood, and measured approximately a half cup. This morning she vomited a third time and says it was a mix of emesis and blood. She has no personal history of PE/DVT, recent travel or surgeries, regular estrogen use, or cancer. The patient denies dark or bloody stools, leg swelling, or weight gain. She doesn't regularly drink alcohol or take NSAIDs. The patient reports taking Prednisone 20 mg tablet yesterday to try and help her symptoms but she experienced no relief.     Allergies:  Codeine  Cyclobenzaprine  Hydrocodone  Sulfa Drugs  Gabapentin     Medications:    Albuterol inhaler  Amlodipine  Symbicort  Cetrizine  Doxycycline  Trelegy ellipta inhaler  Lasix  Atarax  Lisinopril  Nitrostat  Omeprazole  Mirapex  Zantac  Spiriva inhaler    Past Medical History:    High blood pressure  High cholesterol  COPD  Asthma  Chronic pain  Anxiety  Depression  Emphysema  GERD  Hypertension  Liver disease  Non alcoholic serohepatitis    Past Surgical History:    Appendectomy  Cholecystectomy  Arthroscopy, left shoulder decompression  Bronchial thermoplasty, x3   Discectomy, fusion cervical anterior one level  Excision of nodes,  mediastinal  Thoracoscopy  Tonsillectomy    Family History:    Heart disease, mother  Hypertension  Cerebrovascular disease  Depression  Gallbladder disease  Asthma  Diabetes    Social History:  The patient was unaccompanied to the ED.  Smoking Status: Positive  Smokeless Tobacco: Never Used  Alcohol Use: Negative  Drug Use: Negative    Marital Status:      Review of Systems   Constitutional: Negative for unexpected weight change.   Respiratory: Positive for cough and shortness of breath.    Cardiovascular: Positive for chest pain. Negative for leg swelling.   Gastrointestinal: Positive for vomiting. Negative for blood in stool.        Hematemesis   All other systems reviewed and are negative.      Physical Exam     Patient Vitals for the past 24 hrs:   BP Temp Pulse Resp SpO2   09/10/19 1315 (!) 143/85 -- -- 16 95 %   09/10/19 1300 -- -- -- -- 93 %   09/10/19 1245 -- -- -- -- 95 %   09/10/19 1230 -- -- -- -- 94 %   09/10/19 1215 -- -- -- -- 97 %   09/10/19 1200 -- -- -- -- 97 %   09/10/19 1140 (!) 165/100 98.4  F (36.9  C) 76 24 100 %     Physical Exam    General:   Pleasant, age appropriate female.  HEENT:    Oropharynx is moist, without lesions or trismus.  Eyes:    Conjunctiva normal  Neck:    Supple, no meningismus.     CV:     Regular rate and rhythm.      No murmurs, rubs or gallops.       No unilateral leg swelling.       2+ radial pulses bilateral.       No lower extremity edema.  PULM:    Late expiratory wheezing (2 duonebs prior to evaluation)     No respiratory distress.      Diminished air exchange.     No rales      No stridor.  ABD:    Soft, non-tender, non-distended.       No pulsatile masses.       No rebound, guarding or rigidity.  MSK:     No gross deformity to all four extremities.   LYMPH:   No cervical lymphadenopathy.  NEURO:   Alert. Good muscle tone, no atrophy.  Skin:    Warm, dry and intact.    Psych:    Mood is good and affect is appropriate.          Emergency Department Course      Laboratory:  Laboratory findings were communicated with the patient who voiced understanding of the findings.  CBC: WNL (WBC 14.9, HGB 15.5, )  CMP: AWNL (Creatinine 0.77)    Blood gas venous: pH Venous 7.40, PCO2 Venous 45, PO2 Venous 51 (H), Bicarbonate 27, Oxyhemoglobin 83, Base excess venous 1.9, FIO2 room air   Lipase: 117  HCG Qualitative: Negative     Interventions:  1146 DuoNeb 6 mL Nebulization  1209 Methylprednisolone 125 mg IV  1209 Protonix 40 mg IV  1213 Ativan 1 mg PO  1326 Toradol 15 mg IV    Emergency Department Course:  1151 Nursing notes and vitals reviewed.  1200 I performed an exam of the patient as documented above.   1217 IV was inserted and blood was drawn for laboratory testing, results above.  1305 Patient was rechecked and updated.    Findings and plan explained to the Patient. Patient discharged home with instructions regarding supportive care, medications, and reasons to return. The importance of close follow-up was reviewed. The patient was prescribed Azithromycin and Prednisone.     I personally reviewed the laboratory results with the Patient and answered all related questions prior to discharge.    Impression & Plan      Medical Decision Makin-year-old female history of COPD presented to the ED with difficulty breathing.  On examination she had signs of acute bronchospasm.  She improved with bronchodilators and steroids in the ED.  She has no evidence of pneumothorax, pneumonia, pulmonary edema or alternative causes for her difficulty breathing.  Patient will be placed on steroids and azithromycin.  Patient to continue use of bronchodilator's at home and return to the ED for any worsening symptoms.    In addition, she did have a few episodes of hematemesis.  These were overall low in volume.  She is hemodynamically stable in the ED.  Hemoglobin is stable.  She has no other signs of upper GI bleed.  Symptoms are most suggestive of Ruth-Mina tear.  Patient to  follow up with PCP and return to the ED for any worsening symptoms.      Diagnosis:  1. (J44.1) COPD exacerbation (H)      2. (K92.0) Hematemesis, presence of nausea not specified      Disposition:  The patient is discharged to home.     Discharge Medications:  Discharge Medication List as of 9/10/2019  1:28 PM      START taking these medications    Details   azithromycin (ZITHROMAX Z-CHAPITO) 250 MG tablet Two tablets on the first day, then one tablet daily for the next 4 days, Disp-6 tablet, R-0, Local Print      predniSONE (DELTASONE) 20 MG tablet Take 3 tabs by mouth daily x 3 days, then 2 tabs daily x 3 days, then 1 tab daily x 3 days, then 1/2 tab daily x 3 days., Disp-20 tablet, R-0, Local Print             Scribe Disclosure:  PRICE, Amber Heaton, am serving as a scribe at 12:09 PM on 9/10/2019 to document services personally performed by Valdez Sharp MD based on my observations and the provider's statements to me.    9/10/2019   [facility] EMERGENCY DEPARTMENT       Valdez Sharp MD  09/10/19 3534

## 2019-09-10 NOTE — TELEPHONE ENCOUNTER
"  Reason for Disposition    Weak, dizzy or lightheaded    [1] Vomited blood AND [2] more than a few streaks of blood    (Exception: few streaks and only occurred once)    (Exception: swallowed blood from a nosebleed or cut in the mouth)    [1] Constant abdominal pain AND [2] present > 2 hours    Additional Information    Negative: Shock suspected (e.g., cold/pale/clammy skin, too weak to stand, low BP, rapid pulse)    Negative: Difficult to awaken or acting confused (e.g., disoriented, slurred speech)    Negative: Unable to walk, or can only walk with assistance (e.g., requires support)    Negative: Fainted    Negative: [1] Vomited blood AND [2] large amount (example: \"a cup of blood\")    Negative: Rectal bleeding (i.e., bloody stool, blood in stool)    Negative: Sounds like a life-threatening emergency to the triager    Negative: Coughing up blood  (i.e., from lungs)    Negative: [1] Vomiting AND [2] contains black (\"coffee ground\") material    Negative: Tarry or jet black-colored stool    Negative: Taking Coumadin (warfarin) or other strong blood thinner, or known bleeding disorder (e.g., thrombocytopenia)    Negative: Jaundice (yellow eyes) or known cirrhosis of the liver (or history of liver failure or ascites)    Negative: Recent abdominal injury (within last 3 days)    Negative: Pale skin (pallor) of new onset or worsening    Negative: Age > 60 years    Negative: [1] Drinking very little AND [2] dehydration suspected (e.g., no urine > 12 hours, very dry mouth, very lightheaded)    Negative: High-risk adult (e.g., diabetes mellitus, brain tumor, V-P shunt, inguinal hernia, chemotherapy)    Negative: Patient sounds very sick or weak to the triager    Negative: Alcohol abuse    Negative: [1] Vomiting AND [2] abdomen looks much more swollen than usual    Negative: Taking any of the following medications: digoxin (Lanoxin), lithium, theophylline, phenytoin (Dilantin)    Negative: Vomiting (without blood) persists > " "48 hours    Negative: Vomiting a prescription medication    Negative: [1] Few streaks of blood in vomit AND [2] occurred one time    Negative: Swallowed blood from a nosebleed or cut in the mouth    Negative: Red food or drink (e.g., tomato soup, red Samir-Aid)    Answer Assessment - Initial Assessment Questions  1. APPEARANCE of BLOOD: \"What does the blood look like?\" (e.g., color, coffee-grounds)      First emesis red streaks, 2nd emesis nearly all red.  2. AMOUNT: \"How much blood was lost?\"      unknown  3. VOMITING BLOOD: \"How many times did it happen?\" or \"How many times in the past 24 hours?\"      2 times last evening  4. VOMITING WITHOUT BLOOD: \"How many times in the past 24 hours?\"       none  5. ONSET: \"When did vomiting of blood begin?\"      Last evening  6. CAUSE: \"What do you think is causing the vomiting of blood?\"      Does not know  7. BLOOD THINNERS: \"Do you take any blood thinners?\" (e.g., Coumadin/warfarin, Pradaxa/dabigatran, aspirin)      none  8. DEHYDRATION: \"Are there any signs of dehydration?\" \"When was the last time you urinated?\" \"Do you feel dizzy?\"      today  9. ABDOMINAL PAIN: \"Are you having any abdominal pain?\" If yes: \"What does it feel like? \" (e.g., crampy, dull, intermittent, constant)       Aching pain epigastric area  10. DIARRHEA: \"Is there any diarrhea?\" If so, ask: \"How many times today?\"         Denies  11. OTHER SYMPTOMS: \"Do you have any other symptoms?\" (e.g., fever, blood in stool)        See documentation  12. PREGNANCY: \"Is there any chance you are pregnant?\" \"When was your last menstrual period?\"        NA    Protocols used: VOMITING BLOOD-A-AH    "

## 2019-09-10 NOTE — ED TRIAGE NOTES
Patient presents to the ED stating has had a cough x 1 week. Reports feeling SOB. History of COPD and asthma. Additionally states had an emesis last night and this morning that was streaked with blood.

## 2019-09-10 NOTE — TELEPHONE ENCOUNTER
Patient went to the ER, While on her way home in a taxi the azithromycin (ZITHROMAX Z-CHAPITO) 250 MG tablet must have fallen out. Please advise what she can do    Ok to call and  [phone]

## 2019-09-10 NOTE — ED AVS SNAPSHOT
[facility] Emergency Department  [address]  [facility] [address]  Phone:  [phone]  Fax:  [phone]                                    Swetha Patterson   MRN: [id number]    Department:  [facility] Emergency Department   Date of Visit:  9/10/2019           After Visit Summary Signature Page    I have received my discharge instructions, and my questions have been answered. I have discussed any challenges I see with this plan with the nurse or doctor.    ..........................................................................................................................................  Patient/Patient Representative Signature      ..........................................................................................................................................  Patient Representative Print Name and Relationship to Patient    ..................................................               ................................................  Date                                   Time    ..........................................................................................................................................  Reviewed by Signature/Title    ...................................................              ..............................................  Date                                               Time          22EPIC Rev 08/18

## 2019-09-11 RX ORDER — AZITHROMYCIN 250 MG/1
TABLET, FILM COATED ORAL
Qty: 6 TABLET | Refills: 0 | Status: SHIPPED | OUTPATIENT
Start: 2019-09-11 | End: 2019-12-12

## 2019-09-11 NOTE — TELEPHONE ENCOUNTER
Zithromax rx signed and electronically sent to [facility] per MD order.  Left message on patient's voicemail that rx was sent to [facility]. SERJIO Chandra R.N.

## 2019-10-02 ENCOUNTER — TELEPHONE (OUTPATIENT)
Dept: INTERNAL MEDICINE | Facility: CLINIC | Age: 49
End: 2019-10-02

## 2019-10-10 ENCOUNTER — TELEPHONE (OUTPATIENT)
Dept: INTERNAL MEDICINE | Facility: CLINIC | Age: 49
End: 2019-10-10

## 2019-10-10 DIAGNOSIS — R07.9 CHEST PAIN, UNSPECIFIED TYPE: ICD-10-CM

## 2019-10-10 DIAGNOSIS — J30.2 SEASONAL ALLERGIC RHINITIS, UNSPECIFIED TRIGGER: Primary | ICD-10-CM

## 2019-10-10 DIAGNOSIS — L73.2 HIDRADENITIS SUPPURATIVA: ICD-10-CM

## 2019-10-10 NOTE — LETTER
[facility]  [address] [place]  [address]  [phone]                  October 11, 2019       Swetha Patterson  [address]  [place] [address]          Dear Swetha,    In order to optimize your Asthma management, we recently reviewed your medical record and found that you are due for Asthma followup.  Please take the time to fill out the attached  Asthma Control Test  and then send it back in the enclosed envelope.  If your score is less than 20, then a followup exam is recommended and you can call [phone] to schedule that. Lastly, your latest Asthma Action Plan is included which you can review to help self-manage your asthma.      Thank you very much for choosing [facility].   We appreciate the opportunity to serve you and look forward to supporting your healthcare needs in the future.    Best Regards,      [facility]

## 2019-10-10 NOTE — TELEPHONE ENCOUNTER
Patient requesting a note for her Irina Starkey through [facility]. Patient said they will pay for a blood pressure machine, O2 finger monitor and a Medical recliner. Please state how this will help her with her COPD. Fax note to Precious Coelho [phone]  Ok to call patient if needed and ok to nettie [phone]

## 2019-10-10 NOTE — TELEPHONE ENCOUNTER
"Requested Prescriptions   Pending Prescriptions Disp Refills     doxycycline hyclate (VIBRA-TABS) 100 MG tablet Last Written Prescription Date:  5/2/2019  Last Fill Quantity: 90 tablet,  # refills: 0   Last office visit: 6/11/2019 with prescribing provider:  6/11/2019  Future Office Visit:   90 tablet 0     Sig: Take 1 tablet (100 mg) by mouth 2 times daily       There is no refill protocol information for this order        nitroGLYcerin (NITROSTAT) 0.4 MG sublingual tablet Last Written Prescription Date:  6/11/2019  Last Fill Quantity: 25 tablet,  # refills: 0   Last office visit: 6/11/2019 with prescribing provider:  6/11/2019   Future Office Visit:   25 tablet 0     Sig: For chest pain place 1 tablet under the tongue every 5 minutes for 3 doses. If symptoms persist 5 minutes after 1st dose call 911.       Nitrates Failed - 10/10/2019 11:06 AM        Failed - Blood pressure under 140/90 in past 12 months     BP Readings from Last 3 Encounters:   09/10/19 (!) 143/85   06/24/19 125/78   06/11/19 106/74                 Failed - Sublingual nitro order needs review     If refill exceeds 1 bottle per month, please forward request to provider.           Passed - Pt is not on erectile dysfunction medications        Passed - Recent (12 mo) or future (30 days) visit within the authorizing provider's specialty     Patient has had an office visit with the authorizing provider or a provider within the authorizing providers department within the previous 12 mos or has a future within next 30 days. See \"Patient Info\" tab in inbasket, or \"Choose Columns\" in Meds & Orders section of the refill encounter.              Passed - Medication is active on med list        Passed - Patient is age 18 or older        cetirizine HCl 10 MG CAPS Last Written Prescription Date:  3/23/2018  Last Fill Quantity: ?,  # refills: ?   Last office visit: 6/11/2019 with prescribing provider: 6/11/2019  Future Office Visit:         Sig: Take 1 capsule " "(10 mg) by mouth daily as needed Takes in the spring.       Antihistamines Protocol Passed - 10/10/2019 11:06 AM        Passed - Patient is 3-64 years of age     Apply weight-based dosing for peds patients age 3 - 12 years of age.    Forward request to provider for patients under the age of 3 or over the age of 64.          Passed - Recent (12 mo) or future (30 days) visit within the authorizing provider's specialty     Patient has had an office visit with the authorizing provider or a provider within the authorizing providers department within the previous 12 mos or has a future within next 30 days. See \"Patient Info\" tab in inbasket, or \"Choose Columns\" in Meds & Orders section of the refill encounter.              Passed - Medication is active on med list        fluticasone (FLONASE) 50 MCG/ACT nasal spray Last Written Prescription Date:  3/23/2018  Last Fill Quantity: ?,  # refills: ?   Last office visit: 6/11/2019 with prescribing provider:  6/11/2019   Future Office Visit:         Sig: Spray 2 sprays in nostril       Inhaled Steroids Protocol Failed - 10/10/2019 11:06 AM        Failed - Asthma control assessment score within normal limits in last 6 months     Please review ACT score.           Passed - Patient is age 12 or older        Passed - Medication is active on med list        Passed - Recent (6 mo) or future (30 days) visit within the authorizing provider's specialty     Patient had office visit in the last 6 months or has a visit in the next 30 days with authorizing provider or within the authorizing provider's specialty.  See \"Patient Info\" tab in inbasket, or \"Choose Columns\" in Meds & Orders section of the refill encounter.            "

## 2019-10-11 RX ORDER — DOXYCYCLINE HYCLATE 100 MG
100 TABLET ORAL 2 TIMES DAILY
Qty: 90 TABLET | Refills: 0 | Status: SHIPPED | OUTPATIENT
Start: 2019-10-11 | End: 2020-02-20

## 2019-10-11 RX ORDER — NITROGLYCERIN 0.4 MG/1
TABLET SUBLINGUAL
Qty: 25 TABLET | Refills: 0 | Status: SHIPPED | OUTPATIENT
Start: 2019-10-11 | End: 2020-02-04

## 2019-10-11 RX ORDER — FLUTICASONE PROPIONATE 50 MCG
2 SPRAY, SUSPENSION (ML) NASAL DAILY
Qty: 16 G | Refills: 5 | Status: SHIPPED | OUTPATIENT
Start: 2019-10-11 | End: 2020-02-20

## 2019-10-11 ASSESSMENT — ASTHMA QUESTIONNAIRES
QUESTION_4 LAST FOUR WEEKS HOW OFTEN HAVE YOU USED YOUR RESCUE INHALER OR NEBULIZER MEDICATION (SUCH AS ALBUTEROL): THREE OR MORE TIMES PER DAY
QUESTION_5 LAST FOUR WEEKS HOW WOULD YOU RATE YOUR ASTHMA CONTROL: POORLY CONTROLLED
QUESTION_2 LAST FOUR WEEKS HOW OFTEN HAVE YOU HAD SHORTNESS OF BREATH: MORE THAN ONCE A DAY
EMERGENCY_ROOM_LAST_YEAR_TOTAL: TWO
ACT_TOTALSCORE: 7
QUESTION_1 LAST FOUR WEEKS HOW MUCH OF THE TIME DID YOUR ASTHMA KEEP YOU FROM GETTING AS MUCH DONE AT WORK, SCHOOL OR AT HOME: MOST OF THE TIME
QUESTION_3 LAST FOUR WEEKS HOW OFTEN DID YOUR ASTHMA SYMPTOMS (WHEEZING, COUGHING, SHORTNESS OF BREATH, CHEST TIGHTNESS OR PAIN) WAKE YOU UP AT NIGHT OR EARLIER THAN USUAL IN THE MORNING: FOUR OR MORE NIGHTS A WEEK

## 2019-10-11 NOTE — TELEPHONE ENCOUNTER
doxycycline hyclate (VIBRA-TABS)   Routing refill request to provider for review/approval because:  Drug not on the [facility] refill protocol     nitroGLYcerin (NITROSTAT)  Routing refill request to provider for review/approval because:  Blood pressures out of range, needs provider approval     cetirizine HCl     fluticasone (FLONASE)   Asthma: ACT Score is 7 on 5/2/19.  Patient due for asthma control test. Call to patient left voicemail to call clinic. Printed letter, mailed to patient.     Patient called back, asthma control test completed. Score: 7  Routing refill request to provider for review/approval because:  Asthma control test score out of range

## 2019-10-12 ASSESSMENT — ASTHMA QUESTIONNAIRES: ACT_TOTALSCORE: 7

## 2019-10-18 NOTE — TELEPHONE ENCOUNTER
Called patient and she stated that she had discussed this with you in the past.  Patient thinks that the recliner will help with her coughing.  States she coughs more when she is laying flat.  Patient would like you to reconsider for the recliner at least. Please advise, thanks.

## 2019-10-18 NOTE — TELEPHONE ENCOUNTER
Blood pressure has been under control    She has never had low POx    The recliner will not help her COPD      I would have recommended the above if she needed  Letter not done because the medical facts do not support the request

## 2019-10-19 NOTE — TELEPHONE ENCOUNTER
Recliner is for people with ortho disabilities   She can use a wedge under the mattress to sleep with head elevated.   I have no medical support to justify to her insurance a request for a recliner.

## 2019-10-21 ENCOUNTER — TELEPHONE (OUTPATIENT)
Dept: INTERNAL MEDICINE | Facility: CLINIC | Age: 49
End: 2019-10-21

## 2019-10-21 DIAGNOSIS — J44.9 COPD (CHRONIC OBSTRUCTIVE PULMONARY DISEASE) (H): Primary | ICD-10-CM

## 2019-10-21 DIAGNOSIS — J44.9 CHRONIC OBSTRUCTIVE PULMONARY DISEASE, UNSPECIFIED COPD TYPE (H): Primary | ICD-10-CM

## 2019-10-21 NOTE — TELEPHONE ENCOUNTER
Patient calling and it sick with a cough/cold. Patient requesting prednisone  Ok to call and lm [phone]

## 2019-10-21 NOTE — TELEPHONE ENCOUNTER
RECORDS RECEIVED FROM: Internal     DATE RECEIVED: 12/30/19   NOTES STATUS DETAILS   OFFICE NOTE from referring provider Internal 10/18/19 Self referred    OFFICE NOTE from other specialist N/A    DISCHARGE SUMMARY from hospital Internal 12/6/18   DISCHARGE REPORT from the ER Internal 9/10/19, 1/2/19   MEDICATION LIST Internal    IMAGING  (NEED IMAGES AND REPORTS)     CT SCAN Internal 12/26/18   CHEST XRAY (CXR) Internal/in process  1/2/19, 12/26/18 more in epic    TESTS     PULMONARY FUNCTION TESTING (PFT) In process 12/30/19 scheduled

## 2019-10-22 RX ORDER — METHYLPREDNISOLONE 4 MG
TABLET, DOSE PACK ORAL
Qty: 21 TABLET | Refills: 0 | Status: SHIPPED | OUTPATIENT
Start: 2019-10-22 | End: 2019-12-12

## 2019-10-22 NOTE — TELEPHONE ENCOUNTER
Patient denies fever, states she has chest tightness, wheezing, frequent productive cough of yellow phlegm.  SERJIO Chandra R.N.

## 2019-10-28 ENCOUNTER — TELEPHONE (OUTPATIENT)
Dept: INTERNAL MEDICINE | Facility: CLINIC | Age: 49
End: 2019-10-28

## 2019-10-28 NOTE — TELEPHONE ENCOUNTER
She needs assessment - I think UC is the fastest     ( I have no appointments available this week)

## 2019-10-28 NOTE — TELEPHONE ENCOUNTER
Patient calls requesting something additional to her prednisone for her cough.  Patient is wondering if she needs an antibiotic and/or cough syrup with codeine because she is not improving or getting any sleep.  Please advise.

## 2019-10-28 NOTE — TELEPHONE ENCOUNTER
Patient calls requesting a response and RX from this mornings message and is asking for a call back as soon as Dr. Low responds.

## 2019-10-28 NOTE — TELEPHONE ENCOUNTER
Requested Prescriptions   Pending Prescriptions Disp Refills     azithromycin (ZITHROMAX) 250 MG tablet [Pharmacy Med Name: AZITHROMYCIN 250MG TABS] 6 tablet 0     Sig: TAKE 2 TABLETS BY MOUTH ON DAY 1, THEN 1 TABLET DAILY ON DAYS 2 THROUGH 5.       There is no refill protocol information for this order      Last Written Prescription Date:  09/11/2019  Last Fill Quantity: 6,  # refills: 0   Last office visit: 6/11/2019 with prescribing provider:     Future Office Visit:

## 2019-10-29 NOTE — TELEPHONE ENCOUNTER
Left message on voicemail asking patient to return call to clinic.  MD wants her to go to urgent care, needs to be seen and has already been treated over phone.  SERJIO Chandra R.N.

## 2019-10-30 NOTE — TELEPHONE ENCOUNTER
Left message for patient to call back.    Antibiotic was given to patient from ER provider 9-10-19.  If patient is still having issues, may need f/u appointment for eval.      Need to further triage.

## 2019-11-07 RX ORDER — AZITHROMYCIN 250 MG/1
TABLET, FILM COATED ORAL
Qty: 6 TABLET | Refills: 0 | OUTPATIENT
Start: 2019-11-07

## 2019-11-12 ENCOUNTER — TELEPHONE (OUTPATIENT)
Dept: INTERNAL MEDICINE | Facility: CLINIC | Age: 49
End: 2019-11-12

## 2019-11-12 DIAGNOSIS — J44.9 CHRONIC OBSTRUCTIVE PULMONARY DISEASE, UNSPECIFIED COPD TYPE (H): Primary | ICD-10-CM

## 2019-11-12 DIAGNOSIS — Z41.9 PATIENT-REQUESTED PROCEDURE: ICD-10-CM

## 2019-11-12 NOTE — TELEPHONE ENCOUNTER
Patient requesting 1 hour of skilled nursing through  [facility]. Patient needs a referral for this to be done. Please advise ok to call and nettie [phone]

## 2019-11-12 NOTE — TELEPHONE ENCOUNTER
Left message on home voicemail asking patient to return call to clinic.  Need to know why she is requesting home care?  Why she needs a nurse to visit.  Diagnosis?  Is she homebound?   SERJIO Chandra R.N.

## 2019-11-12 NOTE — TELEPHONE ENCOUNTER
Patient has a Irina tracy and this nurse visit is to help her with organizing her medication and helping her take the medication properly. She also will take her vitals. Patient is not homebound. They want to get a medication machine that will help make sure she takes her medication. COPD. Lung surgery 3 times and throat surgery, Hypertension. [place] through the [place] and patient states Dr Low is not allowing anything that they are requesting to help me.

## 2019-11-13 NOTE — TELEPHONE ENCOUNTER
Patient calls and message below relayed.  Patient states that her CADI Waiver worker at [facility] advised her that they will go thru [facility].  Patient is not at home, but she will try to find the [facility] CADI Worker and talk to us directly.  Patient gave verbal permission to talk to the worker when she call in.

## 2019-11-13 NOTE — TELEPHONE ENCOUNTER
Alison calling from [place] and says the orders need to be sent to [facility] and hospice. Edel can be reached [phone].

## 2019-11-13 NOTE — TELEPHONE ENCOUNTER
Left message asking patient to call back.  We may need a contact number for person helping her through the county in order to complete appropriate referral.  Is care going to be through the [facility] or through  Home Care?  If not [facility] then where do we send referral.  Also see response below from MD.  She is not refusing, we just need more information.  SERJIO Chandra R.N.

## 2019-11-13 NOTE — TELEPHONE ENCOUNTER
It would be good to have an RN to help her. Just to clarify she is not home bounded.   It would be good if she has the correct info before she makes the below statement

## 2019-11-14 DIAGNOSIS — F41.9 ANXIETY: ICD-10-CM

## 2019-11-14 NOTE — TELEPHONE ENCOUNTER
"Requested Prescriptions   Pending Prescriptions Disp Refills     hydrOXYzine (ATARAX) 50 MG tablet  Last Written Prescription Date:  5/2/19  Last Fill Quantity: 180,  # refills: 1   Last office visit: 6/11/2019 with prescribing provider:  6/11/19   Future Office Visit:     180 tablet 1     Sig: Take 1-2 tablets ( mg) by mouth 4 times daily as needed for anxiety Increased dose. For anxiety       Antihistamines Protocol Passed - 11/14/2019 10:06 AM        Passed - Recent (12 mo) or future (30 days) visit within the authorizing provider's specialty     Patient has had an office visit with the authorizing provider or a provider within the authorizing providers department within the previous 12 mos or has a future within next 30 days. See \"Patient Info\" tab in inbasket, or \"Choose Columns\" in Meds & Orders section of the refill encounter.              Passed - Patient is age 3 or older     Apply age and/or weight-based dosing for peds patients age 3 and older.    Forward request to provider for patients under the age of 3.          Passed - Medication is active on med list          "

## 2019-11-14 NOTE — TELEPHONE ENCOUNTER
[facility] calls. They states Patient NEEDS Face to Face visit first before they will be able to start Home care.     Please call pt to schedule OV.     Attempted to contact pt. Left message to call clinic.

## 2019-11-14 NOTE — TELEPHONE ENCOUNTER
Call to [facility] at below number. Left detailed message requesting call back to clarify. Please transfer to [place] if available.

## 2019-11-14 NOTE — TELEPHONE ENCOUNTER
HHC referral done    The patient didn't have F2F enc       Just to be clear - if the HHC show she is home bounded I will not sign them.

## 2019-11-15 NOTE — TELEPHONE ENCOUNTER
Patient was due for follow-up in September.  Attempted to contact patient. Left voice message to call back.

## 2019-11-18 DIAGNOSIS — F41.9 ANXIETY: ICD-10-CM

## 2019-11-18 RX ORDER — HYDROXYZINE HYDROCHLORIDE 50 MG/1
50-100 TABLET, FILM COATED ORAL 4 TIMES DAILY PRN
Qty: 180 TABLET | Refills: 0 | Status: SHIPPED | OUTPATIENT
Start: 2019-11-18 | End: 2020-02-04

## 2019-11-18 NOTE — TELEPHONE ENCOUNTER
".  Requested Prescriptions   Pending Prescriptions Disp Refills     hydrOXYzine (ATARAX) 50 MG tablet [Pharmacy Med Name: hydrOXYzine HCL 50MG TAB] 180 tablet 1     Sig: TAKE ONE TO TWO TABLETS BY MOUTH FOUR TIMES A DAY AS NEEDED FOR  ANXIETY   Last Written Prescription Date:  05/02/2019  Last Fill Quantity: 180,  # refills: 01   Last office visit: 6/11/2019 with prescribing provider:     Future Office Visit:   Next 5 appointments (look out 90 days)    Dec 12, 2019 11:00 AM CST  SHORT with [facility]la MD  [facility] [address]  [facility] [address]  [phone]           Antihistamines Protocol Passed - 11/18/2019 10:08 AM        Passed - Recent (12 mo) or future (30 days) visit within the authorizing provider's specialty     Patient has had an office visit with the authorizing provider or a provider within the authorizing providers department within the previous 12 mos or has a future within next 30 days. See \"Patient Info\" tab in inbasket, or \"Choose Columns\" in Meds & Orders section of the refill encounter.              Passed - Patient is age 3 or older     Apply age and/or weight-based dosing for peds patients age 3 and older.    Forward request to provider for patients under the age of 3.          Passed - Medication is active on med list        "

## 2019-11-18 NOTE — TELEPHONE ENCOUNTER
Patient is requesting a medication machine. [facility] is the  for this patient and will send SKILL[facility] [place] [facility] come out once a week to set up her medicine in the machine. This does not require the patient to be homebound

## 2019-11-18 NOTE — TELEPHONE ENCOUNTER
Next 5 appointments (look out 90 days)    Dec 12, 2019 11:00 AM CST  SHORT with Roro Chawla MD  [facility] ([facility]) [address]  [facility] [id number] [phone]        Medication is being filled for 1 time refill only due to:  Patient needs to be seen because due for appt.

## 2019-11-19 NOTE — TELEPHONE ENCOUNTER
Spoke with Edel at [facility] which is case management contracted with [facility].  They will assist with getting the medication dispenser but just need a home care referral to [facility] for skilled nurse visits once a week for med set up.  SERJIO Chandra R.N.

## 2019-11-20 RX ORDER — HYDROXYZINE HYDROCHLORIDE 50 MG/1
TABLET, FILM COATED ORAL
Qty: 180 TABLET | Refills: 1 | OUTPATIENT
Start: 2019-11-20

## 2019-12-12 ENCOUNTER — OFFICE VISIT (OUTPATIENT)
Dept: INTERNAL MEDICINE | Facility: CLINIC | Age: 49
End: 2019-12-12
Payer: COMMERCIAL

## 2019-12-12 VITALS
RESPIRATION RATE: 16 BRPM | HEART RATE: 101 BPM | TEMPERATURE: 97.6 F | BODY MASS INDEX: 40.04 KG/M2 | DIASTOLIC BLOOD PRESSURE: 74 MMHG | SYSTOLIC BLOOD PRESSURE: 120 MMHG | WEIGHT: 226 LBS | OXYGEN SATURATION: 93 % | HEIGHT: 63 IN

## 2019-12-12 DIAGNOSIS — E78.5 HYPERLIPIDEMIA LDL GOAL <130: Chronic | ICD-10-CM

## 2019-12-12 DIAGNOSIS — J44.9 COPD, MODERATE (H): Primary | ICD-10-CM

## 2019-12-12 DIAGNOSIS — L73.2 HIDRADENITIS SUPPURATIVA: ICD-10-CM

## 2019-12-12 DIAGNOSIS — F11.11 HISTORY OF OPIOID ABUSE (H): ICD-10-CM

## 2019-12-12 DIAGNOSIS — I10 ESSENTIAL HYPERTENSION WITH GOAL BLOOD PRESSURE LESS THAN 140/90: Chronic | ICD-10-CM

## 2019-12-12 DIAGNOSIS — J30.89 DUST ALLERGY: ICD-10-CM

## 2019-12-12 DIAGNOSIS — R60.0 BILATERAL LEG EDEMA: ICD-10-CM

## 2019-12-12 DIAGNOSIS — F17.219 CIGARETTE NICOTINE DEPENDENCE WITH NICOTINE-INDUCED DISORDER: ICD-10-CM

## 2019-12-12 DIAGNOSIS — K21.00 GASTROESOPHAGEAL REFLUX DISEASE WITH ESOPHAGITIS: ICD-10-CM

## 2019-12-12 DIAGNOSIS — R73.9 BLOOD SUGAR INCREASED: ICD-10-CM

## 2019-12-12 PROCEDURE — 99214 OFFICE O/P EST MOD 30 MIN: CPT | Performed by: INTERNAL MEDICINE

## 2019-12-12 RX ORDER — ATORVASTATIN CALCIUM 80 MG/1
80 TABLET, FILM COATED ORAL DAILY
Qty: 90 TABLET | Refills: 1 | Status: SHIPPED | OUTPATIENT
Start: 2019-12-12 | End: 2020-06-03

## 2019-12-12 RX ORDER — NICOTINE POLACRILEX 4 MG/1
20 GUM, CHEWING ORAL 2 TIMES DAILY
Qty: 180 TABLET | Refills: 1 | Status: SHIPPED | OUTPATIENT
Start: 2019-12-12 | End: 2020-10-15

## 2019-12-12 RX ORDER — POLYETHYLENE GLYCOL 3350 17 G
2 POWDER IN PACKET (EA) ORAL
Qty: 360 LOZENGE | Refills: 1 | Status: SHIPPED | OUTPATIENT
Start: 2019-12-12 | End: 2020-10-15

## 2019-12-12 RX ORDER — LISINOPRIL 20 MG/1
20 TABLET ORAL DAILY
Qty: 90 TABLET | Refills: 1 | Status: SHIPPED | OUTPATIENT
Start: 2019-12-12 | End: 2020-06-03

## 2019-12-12 RX ORDER — DOXYCYCLINE HYCLATE 100 MG
100 TABLET ORAL 2 TIMES DAILY
Qty: 20 TABLET | Refills: 0 | Status: SHIPPED | OUTPATIENT
Start: 2019-12-12 | End: 2020-01-07

## 2019-12-12 RX ORDER — FUROSEMIDE 20 MG
20 TABLET ORAL DAILY
Qty: 90 TABLET | Refills: 1 | Status: SHIPPED | OUTPATIENT
Start: 2019-12-12 | End: 2020-06-03

## 2019-12-12 RX ORDER — ALBUTEROL SULFATE 90 UG/1
2 AEROSOL, METERED RESPIRATORY (INHALATION) EVERY 6 HOURS PRN
Qty: 18 G | Refills: 3 | Status: SHIPPED | OUTPATIENT
Start: 2019-12-12 | End: 2020-09-21

## 2019-12-12 RX ORDER — ALBUTEROL SULFATE 0.83 MG/ML
2.5 SOLUTION RESPIRATORY (INHALATION) EVERY 6 HOURS PRN
Qty: 25 VIAL | Refills: 3 | Status: SHIPPED | OUTPATIENT
Start: 2019-12-12 | End: 2021-01-06

## 2019-12-12 RX ORDER — VARENICLINE TARTRATE 1 MG/1
1 TABLET, FILM COATED ORAL 2 TIMES DAILY
Qty: 60 TABLET | Refills: 3 | Status: SHIPPED | OUTPATIENT
Start: 2019-12-12 | End: 2020-10-15

## 2019-12-12 RX ORDER — MONTELUKAST SODIUM 10 MG/1
TABLET ORAL
Qty: 90 TABLET | Refills: 0 | Status: SHIPPED | OUTPATIENT
Start: 2019-12-12 | End: 2020-03-23

## 2019-12-12 RX ORDER — AMLODIPINE BESYLATE 10 MG/1
10 TABLET ORAL DAILY
Qty: 90 TABLET | Refills: 1 | Status: SHIPPED | OUTPATIENT
Start: 2019-12-12 | End: 2020-03-27 | Stop reason: DRUGHIGH

## 2019-12-12 ASSESSMENT — MIFFLIN-ST. JEOR: SCORE: 1614.26

## 2019-12-12 NOTE — PATIENT INSTRUCTIONS
Plan:  1. Chantix  2. Albuterol   3. Nicotine Lozenges   4. Flu vaccine today   5. Try Clindamycin gel to the breast skin lump  6. If it doesn't get better take Doxycycline   7. Resume Atorvastatin 80 mg daily  8. Please make a lab appointment for fasting labs in 2 months  9. Please make an appointment few days after the labs to discuss about the results. And  ANNUAL EXAM

## 2019-12-12 NOTE — PROGRESS NOTES
Dr Low's note      Patient's instructions / PLAN:                                                        Plan:  1. Chantix  2. Albuterol   3. Nicotine Lozenges   4. Flu vaccine today   5. Try Clindamycin gel to the breast skin lump  6. If it doesn't get better take Doxycycline   7. Resume Atorvastatin 80 mg daily  8. Please make a lab appointment for fasting labs in 2 months  9. Please make an appointment few days after the labs to discuss about the results. And  ANNUAL EXAM          ASSESSMENT & PLAN:                                                      (J44.9) COPD, moderate (H)  (primary encounter diagnosis)  Comment: Not controlled   Plan: albuterol (PROAIR HFA/PROVENTIL HFA/VENTOLIN         HFA) 108 (90 Base) MCG/ACT inhaler, albuterol         (PROVENTIL) (2.5 MG/3ML) 0.083% neb solution,         montelukast (SINGULAIR) 10 MG tablet            (I10) Essential hypertension with goal blood pressure less than 140/90  Comment: Controlled    Plan: amLODIPine (NORVASC) 10 MG tablet, lisinopril         (PRINIVIL/ZESTRIL) 20 MG tablet, CBC with         platelets, Comprehensive metabolic panel, Lipid        panel reflex to direct LDL Fasting, Albumin         Random Urine Quantitative with Creat Ratio, TSH        with free T4 reflex            (F17.219) Cigarette nicotine dependence with nicotine-induced disorder  Comment:   Plan: varenicline (CHANTIX STARTING MONTH CHAPITO) 0.5 MG        X 11 & 1 MG X 42 tablet, varenicline (CHANTIX)         1 MG tablet, nicotine (EQ NICOTINE POLACRILEX)         2 MG lozenge            (L73.2) Hidradenitis suppurativa  Comment: lesion on breast   Plan: doxycycline hyclate (VIBRA-TABS) 100 MG tablet            (E78.5) Hyperlipidemia LDL goal <130  Comment: Not controlled   Plan: atorvastatin (LIPITOR) 80 MG tablet, CBC with         platelets, Comprehensive metabolic panel, Lipid        panel reflex to direct LDL Fasting, TSH with         free T4 reflex            (R60.0) Bilateral leg  edema  Comment:   Plan: furosemide (LASIX) 20 MG tablet            (J30.89) Dust allergy  Comment:   Plan: montelukast (SINGULAIR) 10 MG tablet            (K21.0) Gastroesophageal reflux disease with esophagitis  Comment:   Plan: omeprazole 20 MG tablet            (R73.09) Blood sugar increased  Comment:   Plan: Hemoglobin A1c            (F11.11) History of opioid abuse (H) Rx was stopped when she failed urine drug test  Comment:   Plan:        Chief complaint:                                                      Few things  Follow up chronic medical problems      SUBJECTIVE:                                                         Swetha Patterson is a 49 year old female who presents to clinic today for the following health issues:     smoking  -- she wants try again Chantix   -- she has tried it in the past and it helped and she didn't have side effedcts  -- she took Nicotine lozenges       L breast bump  -- x 2 m   -- exam: 1 cm reddish subcutaneous nodule tender. No breast lumps    Skin: the hydradenitis on the abd is better since she put the pants elastic up on the abdomen    COPD  --She wants to quit smoking  --She has an appointment with the lung clinic in December and she plans to keep it    Hyperlipidemia Follow-Up      Are you regularly taking any medication or supplement to lower your cholesterol?   Yes- lipitor    Are you having muscle aches or other side effects that you think could be caused by your cholesterol lowering medication?  No    Hypertension Follow-up      Do you check your blood pressure regularly outside of the clinic? No     Are you following a low salt diet? No    Are your blood pressures ever more than 140 on the top number (systolic) OR more   than 90 on the bottom number (diastolic), for example 140/90? No      How many servings of fruits and vegetables do you eat daily?  2-3    On average, how many sweetened beverages do you drink each day (Examples: soda, juice, sweet tea, etc.  Do  "NOT count diet or artificially sweetened beverages)?   4 sodas  How many days per week do you miss taking your medication? 2    What makes it hard for you to take your medications?  remembering to take      Review of Systems:                                                      ROS: negative for fever, chills, cough, wheezes, chest pain, shortness of breath, vomiting, abdominal pain, leg swelling Pos for cough and chr SOB        OBJECTIVE:             Physical exam:  Blood pressure 120/74, pulse 101, temperature 97.6  F (36.4  C), temperature source Oral, resp. rate 16, height 1.6 m (5' 3\"), weight 102.5 kg (226 lb), last menstrual period 04/15/2016, SpO2 93 %, not currently breastfeeding.   NAD, appears comfortable  Skin: no rashes   HEENT: PERRLA, EOMI, pink conjunctiva, anicteric sclerae, bilateral tympanic membranes are clinically normal, oropharynx is normal color  Neck: supple, no JVD,  No thyroidmegaly. Lymph nodes nonpalpable cervical and supraclavicular.  Chest: wheezes bilaterally, good respiratory effort  Heart: S1 S2, RRR, no mgr appreciated  Abdomen: soft, not tender,  Extremities: no edema,  Neurologic: A, Ox3, no focal signs appreciated    PMHx: reviewed  Past Medical History:   Diagnosis Date     Anxiety state, unspecified      Asthma      Chronic pain     back pain from cyst     Contact dermatitis and other eczema, due to unspecified cause      COPD (chronic obstructive pulmonary disease) (H)      Depressive disorder, not elsewhere classified      Emphysema with chronic bronchitis (H)      Esophageal reflux      Family history of ischemic heart disease      Gastro-oesophageal reflux disease      History of emphysema      Hoarseness      HTN, goal below 140/90     No cardilogist     Hyperlipidemia LDL goal <130      Liver disease     \"fatty liver\"     Other chronic pain     chest, from coughing     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)      PONV (postoperative nausea and vomiting)       PSHx: " reviewed  Past Surgical History:   Procedure Laterality Date     ARTHROSCOPY SHOULDER DECOMPRESSION Left 10/21/2015    Procedure: ARTHROSCOPY SHOULDER DECOMPRESSION;  Surgeon: Julien Milian MD;  Location: [facility] OR     BRONCHIAL THERMOPLASTY N/A 11/14/2014    Procedure: BRONCHIAL THERMOPLASTY;  Surgeon: Ward Whitaker MD;  Location: [facility] GI     BRONCHIAL THERMOPLASTY N/A 12/19/2014    Procedure: BRONCHIAL THERMOPLASTY;  Surgeon: Ward Whitaker MD;  [facility] OR     BRONCHIAL THERMOPLASTY N/A 2/6/2015    Procedure: BRONCHIAL THERMOPLASTY;  Surgeon: Ward Whitaker MD;  Location: [facility] OR     C APPENDECTOMY  at age 17     COLONOSCOPY N/A 11/26/2018    Procedure: COLONOSCOPY ([facility]);  Surgeon: Marshall Oakes MD;  Location: [facility] OR     DISCECTOMY, FUSION CERVICAL ANTERIOR ONE LEVEL, COMBINED N/A 5/1/2018    Procedure: COMBINED DISCECTOMY, FUSION CERVICAL ANTERIOR ONE LEVEL;  1.  C5-C6 anterior cervical diskectomy and fusion.    2.  C5-C6 application of intervertebral biomechanical device for interbody fusion purposes.    3.  C5-C6 anterior instrumentation using the standard Kristin InViZia 24 mm plate with four associated bone screws, 12 mm in length.  Inferior screws are fixed.  Superior screws are va     EXCISE NODE MEDIASTINAL  4/26/2013    Procedure: EXCISE NODE MEDIASTINAL;;  Surgeon: Av Peña MD;  Location:  OR     LAPAROSCOPIC CHOLECYSTECTOMY N/A 10/19/2017    Procedure: LAPAROSCOPIC CHOLECYSTECTOMY;  LAPAROSCOPIC CHOLECYSTECTOMY;  Surgeon: Ney Jerry MD;  Location:  OR     THORACOSCOPY  4/26/2013    Procedure: THORACOSCOPY;  LEFT VIDEO ASSISTED THORACOSCOPY, RESECTION OF POSTERIOR MEDIASTINAL MASS;  Surgeon: Av Peña MD;  Location:  OR     TONSILLECTOMY  as a kid     TONSILLECTOMY          Meds: reviewed  Current Outpatient Medications   Medication Sig Dispense Refill     albuterol (2.5 MG/3ML) 0.083% neb solution Take 1 vial (2.5 mg) by  nebulization every 6 hours as needed for shortness of breath / dyspnea or wheezing 25 vial 3     albuterol (PROAIR HFA/PROVENTIL HFA/VENTOLIN HFA) 108 (90 Base) MCG/ACT inhaler Inhale 2 puffs into the lungs every 6 hours as needed for shortness of breath / dyspnea 18 g 3     amLODIPine (NORVASC) 10 MG tablet Take 1 tablet (10 mg) by mouth daily 90 tablet 1     atorvastatin (LIPITOR) 80 MG tablet Take 1 tablet (80 mg) by mouth daily 90 tablet 1     benzoyl peroxide (ACNE-CLEAR) 10 % external gel Apply topically 2 times daily 90 g 3     budesonide-formoterol (SYMBICORT) 160-4.5 MCG/ACT Inhaler Inhale 2 puffs into the lungs 2 times daily 1 Inhaler 0     cetirizine HCl 10 MG CAPS Take 1 capsule (10 mg) by mouth daily as needed Takes in the spring. 90 capsule 3     clindamycin (CLINDAMAX) 1 % external gel Apply topically 2 times daily 60 g 3     D3-50 27039 units capsule TAKE ONE CAPSULE BY MOUTH EVERY 7 DAYS  (AFTER THIS PRESCRIPTION, PATIENT TO CONTINUE WITH VITAMIN D3 2,000 UNITS) OVER THE COUNTER 13 capsule 0     desonide (DESOWEN) 0.05 % cream Apply topically as needed        doxycycline hyclate (VIBRA-TABS) 100 MG tablet Take 1 tablet (100 mg) by mouth 2 times daily 90 tablet 0     fluticasone (FLONASE) 50 MCG/ACT nasal spray Spray 2 sprays in nostril daily 16 g 5     Fluticasone-Umeclidin-Vilanterol (TRELEGY ELLIPTA) 100-62.5-25 MCG/INH oral inhaler Inhale 1 puff into the lungs daily 1 Inhaler 0     furosemide (LASIX) 20 MG tablet Take 1 tablet (20 mg) by mouth daily 90 tablet 1     hydrOXYzine (ATARAX) 50 MG tablet Take 1-2 tablets ( mg) by mouth 4 times daily as needed for anxiety Increased dose. For anxiety 180 tablet 0     IBUPROFEN PO Take 600 mg by mouth every 6 hours as needed for moderate pain       lisinopril (PRINIVIL/ZESTRIL) 20 MG tablet Take 1 tablet (20 mg) by mouth daily 90 tablet 1     montelukast (SINGULAIR) 10 MG tablet TAKE ONE TABLET BY MOUTH AT BEDTIME 90 tablet 0     nicotine polacrilex  (RA NICOTINE GUM) 2 MG gum Place 2 mg inside cheek as needed for smoking cessation       nitroGLYcerin (NITROSTAT) 0.4 MG sublingual tablet For chest pain place 1 tablet under the tongue every 5 minutes for 3 doses. If symptoms persist 5 minutes after 1st dose call 911. 25 tablet 0     omeprazole 20 MG tablet Take 20 mg by mouth 2 times daily       pramipexole (MIRAPEX) 0.125 MG tablet Take 1-2 tablets (0.125-0.25 mg) by mouth nightly as needed (RLS) 60 tablet 3     ranitidine (ZANTAC) 75 MG tablet Take 75 mg by mouth daily        tiotropium (SPIRIVA HANDIHALER) 18 MCG capsule Inhale contents of one capsule daily. 90 capsule 3       Soc Hx: reviewed  Fam Hx: reviewed          Roro Low MD  Internal Medicine

## 2019-12-13 PROBLEM — J44.9 COPD, MODERATE (H): Status: ACTIVE | Noted: 2019-12-13

## 2019-12-13 PROBLEM — F11.11 HISTORY OF OPIOID ABUSE (H): Status: ACTIVE | Noted: 2019-12-13

## 2019-12-16 ENCOUNTER — TELEPHONE (OUTPATIENT)
Dept: INTERNAL MEDICINE | Facility: CLINIC | Age: 49
End: 2019-12-16

## 2019-12-16 NOTE — TELEPHONE ENCOUNTER
Allison calling from [facility]. The patient is hoping to get an order for a blood pressure machine and a oximeter.Fax order to She at [phone]. Her phone is [phone]. She is also asking for  homecare visit to have the medication machine filled.

## 2019-12-16 NOTE — TELEPHONE ENCOUNTER
At her last office visit we addressed a lot of things and we did not address these requests because the time did not allow  We will need to address these issues in a different office visit  Rx not signed

## 2019-12-16 NOTE — TELEPHONE ENCOUNTER
Call to She at below number. Left detailed message. Unsure if this info needs to be relayed to patient also or just She.

## 2019-12-16 NOTE — TELEPHONE ENCOUNTER
Will route message to primary care provider. DME orders pended. Unclear if [place] is asking for home care services to be initiated. Left message at below number requesting call back to clarify.    Discussed with María. See also 11/12/19 telephone encounter. Patient can get home medication machine but needs home care orders for skilled nursing once per week for medication set up. Per 11/12/19 telephone encounter face to face was needed. Patient was seen 12/12. Please advise if home care can now be ordered. Also per 11/12/19 telephone encounter patient does not need to be home bound per [facility].

## 2019-12-17 ENCOUNTER — TELEPHONE (OUTPATIENT)
Dept: INTERNAL MEDICINE | Facility: CLINIC | Age: 49
End: 2019-12-17

## 2019-12-17 NOTE — TELEPHONE ENCOUNTER
Allison called back to advise that we should contact the patient directly to schedule the appointment.  Can we use a same day or will patient need to wait until first available OV on 01/07/20 or after?  Please advise.

## 2019-12-17 NOTE — TELEPHONE ENCOUNTER
Shaylee is requesting a prior authorization for Chantix 1mg tab      Pharmacy [facility] -----bob # [phone]                                                                              Fax # [phone]       Customer support # [phone] ( shaylee )

## 2019-12-18 ENCOUNTER — TELEPHONE (OUTPATIENT)
Dept: INTERNAL MEDICINE | Facility: CLINIC | Age: 49
End: 2019-12-18

## 2019-12-18 NOTE — TELEPHONE ENCOUNTER
Central Prior Authorization Team  Phone: [phone]    PA Initiation    Medication: CHANTIX 0.5 & 1 MG STARTER CHAPITO  Insurance Company: MED[employer] - Phone [phone] Fax [phone]  Pharmacy Filling the Rx: [facility] [address] [place] [address] - [address]  Filling Pharmacy Phone: [phone]  Filling Pharmacy Fax:    Start Date: 12/18/2019

## 2019-12-18 NOTE — TELEPHONE ENCOUNTER
Central Prior Authorization Team   Phone: [phone]      PA Initiation    Medication: varenicline (CHANTIX) 1 MG tablet-Initiated  Insurance Company: Other (see comments)Comment:  [facility] [phone]  Pharmacy Filling the Rx: [facility] [address] [place], [place] [address] - [address]  Filling Pharmacy Phone: [phone]  Filling Pharmacy Fax:    Start Date: 12/18/2019

## 2019-12-18 NOTE — TELEPHONE ENCOUNTER
Prior Authorization Approval    Authorization Effective Date: 9/19/2019  Authorization Expiration Date: 6/15/2020  Medication: varenicline (CHANTIX) 1 MG tablet-APPROVED  Approved Dose/Quantity:   Reference #:     Insurance Company: Other (see comments)Comment:  [employer] [phone]  Expected CoPay:       CoPay Card Available:      Foundation Assistance Needed:    Which Pharmacy is filling the prescription (Not needed for infusion/clinic administered): [facility] [address] [place] [address] [address] [facility]  Pharmacy Notified: Yes  Patient Notified: No    Pharmacy will notify patient when medication is ready.

## 2019-12-18 NOTE — TELEPHONE ENCOUNTER
Prior Authorization Retail Medication Request    Medication/Dose: varenicline (CHANTIX) 1 MG tablet  ICD code (if different than what is on RX):  Cigarette nicotine dependence with nicotine-induced disorder   Previously Tried and Failed:  n/a  Rationale:  n/a    Insurance Name:  [employer] [facility] [place]/Pending sale to [facility]   Insurance ID:  [id number]       Pharmacy Information (if different than what is on RX)  Name:  [facility]  Phone:  [phone]

## 2019-12-19 NOTE — TELEPHONE ENCOUNTER
No pa needed. Previously, a pa for chantix 1mg got approved and coverage is also provided for the starter jose alberto (see encounter on 12/17/19). Confirmed with pharmacy that both the starter jose alberto and maintenance medications went through insurance.

## 2019-12-30 ENCOUNTER — PRE VISIT (OUTPATIENT)
Dept: PULMONOLOGY | Facility: CLINIC | Age: 49
End: 2019-12-30

## 2020-01-07 ENCOUNTER — OFFICE VISIT (OUTPATIENT)
Dept: INTERNAL MEDICINE | Facility: CLINIC | Age: 50
End: 2020-01-07
Payer: COMMERCIAL

## 2020-01-07 VITALS
HEART RATE: 88 BPM | DIASTOLIC BLOOD PRESSURE: 82 MMHG | OXYGEN SATURATION: 96 % | RESPIRATION RATE: 18 BRPM | SYSTOLIC BLOOD PRESSURE: 134 MMHG | WEIGHT: 230 LBS | BODY MASS INDEX: 40.74 KG/M2 | TEMPERATURE: 98.2 F

## 2020-01-07 DIAGNOSIS — Z79.899 POLYPHARMACY: ICD-10-CM

## 2020-01-07 DIAGNOSIS — J44.9 CHRONIC OBSTRUCTIVE PULMONARY DISEASE, UNSPECIFIED COPD TYPE (H): Primary | ICD-10-CM

## 2020-01-07 DIAGNOSIS — F41.9 ANXIETY: ICD-10-CM

## 2020-01-07 DIAGNOSIS — I10 HTN, GOAL BELOW 140/90: ICD-10-CM

## 2020-01-07 PROCEDURE — 99214 OFFICE O/P EST MOD 30 MIN: CPT | Performed by: INTERNAL MEDICINE

## 2020-01-07 NOTE — PROGRESS NOTES
Dr Low's note            ASSESSMENT & PLAN:                                                      Swetha came into the office today requesting a home BP monitor and TC4ifzln because she has COPD. I explained to her that her O2 has been above consistently above 90 and her BP readings are normal at home. I explained there may be an issue with insurance coverage because her chart does not supports her case for getting the machines in her home.      (J44.9) Chronic obstructive pulmonary disease, unspecified COPD type (H)  (primary encounter diagnosis)  Comment: Controlled she continues to smoke and I advised her to quit  Plan: order for DME, order for DME   I advised her to keep appointment with pulmonary            (I10) HTN, goal below 140/90  Comment: Controlled    Plan: order for DME, order for DME        Continue same meds, same doses for now       (F41.9) Anxiety  Comment: Not controlled   Plan: f/u w psychiatry     (Z79.899) Polypharmacy  Comment:   Plan: I I reviewed all the medications with her and I instructed her which one to take in the morning which one to take at night and which 1 hour twice a day.  I hope it helps with the home nurse        Chief complaint:                                                      Pulse oximeter, blood pressure machine  Home nurse for medications    SUBJECTIVE:                                                    History of present illness:      COPD  -- she would like the POxymeter for home   -- She had to reschedule Lung appt because her previous provider has left the practice.     Stress  -- started Chantix on 1/03/2020  -- feels like she wants to jump out of her skin  -- Thinks this may be due to the Chantix      Panic attack  -- when she can't breathe     Meds  -- She is inconsistent with her meds  -- She is too stressed with other things too keep track  -- Says it is too complicated, she gets frustrated   -- Sometimes she forgets   -- Does not take Lipitor at  bedtime  -- Takes doxycycline correctly   -- she is looking forward to have the nurse help her with the meds        Hyperlipidemia Follow-Up       Are you regularly taking any medication or supplement to lower your cholesterol?   Yes- lipitor    Are you having muscle aches or other side effects that you think could be caused by your cholesterol lowering medication?  Yes- .    Hypertension Follow-up      Do you check your blood pressure regularly outside of the clinic? Yes     Are you following a low salt diet? No    Are your blood pressures ever more than 140 on the top number (systolic) OR more   than 90 on the bottom number (diastolic), for example 140/90? Yes      How many servings of fruits and vegetables do you eat daily?  0-1    On average, how many sweetened beverages do you drink each day (Examples: soda, juice, sweet tea, etc.  Do NOT count diet or artificially sweetened beverages)?   2  How many days per week do you miss taking your medication? 3    What makes it hard for you to take your medications?  remembering to take      Review of Systems:                                                      ROS: negative for fever, chills, cough, wheezes, chest pain, shortness of breath, vomiting, abdominal pain, leg swelling      This document serves as a record of the services and decisions personally performed and made by Dr. Maranda MD. It was created on their behalf by Jimmy Somers, a trained medical scribe. The creation of this document is based on the provider's statements to the medical scribe.  Jimmy Somers January 7, 2020 2:41 PM    OBJECTIVE:           Physical exam:  Blood pressure 134/82, pulse 88, temperature 98.2  F (36.8  C), resp. rate 18, weight 104.3 kg (230 lb), last menstrual period 04/15/2016, SpO2 96 %, not currently breastfeeding.    NAD, appears comfortable  Chest: clear to auscultation bilaterally, good respiratory effort  Heart: S1 S2, RRR, no mgr appreciated  Abdomen: soft, not tender,  "  Extremities: no edema,   Neurologic: A, Ox3, no focal signs appreciated    PMHx: reviewed  Past Medical History:   Diagnosis Date     Anxiety state, unspecified      Asthma      Chronic pain     back pain from cyst     Contact dermatitis and other eczema, due to unspecified cause      COPD (chronic obstructive pulmonary disease) (H)      Depressive disorder, not elsewhere classified      Emphysema with chronic bronchitis (H)      Esophageal reflux      Family history of ischemic heart disease      Gastro-oesophageal reflux disease      History of emphysema      Hoarseness      HTN, goal below 140/90     No cardilogist     Hyperlipidemia LDL goal <130      Liver disease     \"fatty liver\"     Other chronic pain     chest, from coughing     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)      PONV (postoperative nausea and vomiting)       PSHx: reviewed  Past Surgical History:   Procedure Laterality Date     ARTHROSCOPY SHOULDER DECOMPRESSION Left 10/21/2015    Procedure: ARTHROSCOPY SHOULDER DECOMPRESSION;  Surgeon: Julien Milian MD;  Location: [facility]     BRONCHIAL THERMOPLASTY N/A 11/14/2014    Procedure: BRONCHIAL THERMOPLASTY;  Surgeon: Ward Whitaker MD;  [facility]: [facility]     BRONCHIAL THERMOPLASTY N/A 12/19/2014    Procedure: BRONCHIAL THERMOPLASTY;  Surgeon: Ward Whitaker MD;  [facility]     BRONCHIAL THERMOPLASTY N/A 2/6/2015    Procedure: BRONCHIAL THERMOPLASTY;  Surgeon: Ward Whitaker MD;  [facility]     C APPENDECTOMY  at age 17     COLONOSCOPY N/A 11/26/2018    Procedure: COLONOSCOPY ([facility]);  Surgeon: Marshall Oakes MD;  Location: [facility] OR     DISCECTOMY, FUSION CERVICAL ANTERIOR ONE LEVEL, COMBINED N/A 5/1/2018    Procedure: COMBINED DISCECTOMY, FUSION CERVICAL ANTERIOR ONE LEVEL;  1.  C5-C6 anterior cervical diskectomy and fusion.    2.  C5-C6 application of intervertebral biomechanical device for interbody fusion purposes.    3.  C5-C6 anterior instrumentation using " the standard Kristin InViZia 24 mm plate with four associated bone screws, 12 mm in length.  Inferior screws are fixed.  Superior screws are va     EXCISE NODE MEDIASTINAL  4/26/2013    Procedure: EXCISE NODE MEDIASTINAL;;  Surgeon: Av Peña MD;  Location:  OR     LAPAROSCOPIC CHOLECYSTECTOMY N/A 10/19/2017    Procedure: LAPAROSCOPIC CHOLECYSTECTOMY;  LAPAROSCOPIC CHOLECYSTECTOMY;  Surgeon: Ney Jerry MD;  Location: [facility] OR     THORACOSCOPY  4/26/2013    Procedure: THORACOSCOPY;  LEFT VIDEO ASSISTED THORACOSCOPY, RESECTION OF POSTERIOR MEDIASTINAL MASS;  Surgeon: Av Peña MD;  Location:  OR     TONSILLECTOMY  as a kid     TONSILLECTOMY          Meds: reviewed  Current Outpatient Medications   Medication Sig Dispense Refill     albuterol (PROAIR HFA/PROVENTIL HFA/VENTOLIN HFA) 108 (90 Base) MCG/ACT inhaler Inhale 2 puffs into the lungs every 6 hours as needed for shortness of breath / dyspnea 18 g 3     albuterol (PROVENTIL) (2.5 MG/3ML) 0.083% neb solution Take 1 vial (2.5 mg) by nebulization every 6 hours as needed for shortness of breath / dyspnea or wheezing 25 vial 3     amLODIPine (NORVASC) 10 MG tablet Take 1 tablet (10 mg) by mouth daily 90 tablet 1     atorvastatin (LIPITOR) 80 MG tablet Take 1 tablet (80 mg) by mouth daily 90 tablet 1     benzoyl peroxide (ACNE-CLEAR) 10 % external gel Apply topically 2 times daily 90 g 3     budesonide-formoterol (SYMBICORT) 160-4.5 MCG/ACT Inhaler Inhale 2 puffs into the lungs 2 times daily 1 Inhaler 0     clindamycin (CLINDAMAX) 1 % external gel Apply topically 2 times daily 60 g 3     D3-50 39021 units capsule TAKE ONE CAPSULE BY MOUTH EVERY 7 DAYS  (AFTER THIS PRESCRIPTION, PATIENT TO CONTINUE WITH VITAMIN D3 2,000 UNITS) OVER THE COUNTER 13 capsule 0     doxycycline hyclate (VIBRA-TABS) 100 MG tablet Take 1 tablet (100 mg) by mouth 2 times daily 90 tablet 0     fluticasone (FLONASE) 50 MCG/ACT nasal spray Spray 2 sprays in  nostril daily 16 g 5     Fluticasone-Umeclidin-Vilanterol (TRELEGY ELLIPTA) 100-62.5-25 MCG/INH oral inhaler Inhale 1 puff into the lungs daily 1 Inhaler 0     furosemide (LASIX) 20 MG tablet Take 1 tablet (20 mg) by mouth daily 90 tablet 1     hydrOXYzine (ATARAX) 50 MG tablet Take 1-2 tablets ( mg) by mouth 4 times daily as needed for anxiety Increased dose. For anxiety 180 tablet 0     IBUPROFEN PO Take 600 mg by mouth every 6 hours as needed for moderate pain       lisinopril (PRINIVIL/ZESTRIL) 20 MG tablet Take 1 tablet (20 mg) by mouth daily 90 tablet 1     montelukast (SINGULAIR) 10 MG tablet TAKE ONE TABLET BY MOUTH AT BEDTIME 90 tablet 0     nicotine (EQ NICOTINE POLACRILEX) 2 MG lozenge Place 1 lozenge (2 mg) inside cheek every hour as needed for smoking cessation 360 lozenge 1     nitroGLYcerin (NITROSTAT) 0.4 MG sublingual tablet For chest pain place 1 tablet under the tongue every 5 minutes for 3 doses. If symptoms persist 5 minutes after 1st dose call 911. 25 tablet 0     omeprazole 20 MG tablet Take 1 tablet (20 mg) by mouth 2 times daily 180 tablet 1     tiotropium (SPIRIVA HANDIHALER) 18 MCG capsule Inhale contents of one capsule daily. 90 capsule 3     varenicline (CHANTIX STARTING MONTH PAK) 0.5 MG X 11 & 1 MG X 42 tablet Take 0.5 mg tab daily for 3 days, then 0.5 mg tab twice daily for 4 days, then 1 mg twice daily. 53 tablet 0     varenicline (CHANTIX) 1 MG tablet Take 1 tablet (1 mg) by mouth 2 times daily 60 tablet 3     cetirizine HCl 10 MG CAPS Take 1 capsule (10 mg) by mouth daily as needed Takes in the spring. (Patient not taking: Reported on 1/7/2020) 90 capsule 3     desonide (DESOWEN) 0.05 % cream Apply topically as needed        nicotine polacrilex (RA NICOTINE GUM) 2 MG gum Place 2 mg inside cheek as needed for smoking cessation       ranitidine (ZANTAC) 75 MG tablet Take 75 mg by mouth daily          Soc Hx: reviewed  Fam Hx: reviewed    The information in this document,  created by the medical scribe for me, accurately reflects the services I personally performed and the decisions made by me. I have reviewed and approved this document for accuracy prior to leaving the patient care area.  January 7, 2020 3:11 PM     Roro Low MD  Internal Medicine

## 2020-01-24 ENCOUNTER — TELEPHONE (OUTPATIENT)
Dept: INTERNAL MEDICINE | Facility: CLINIC | Age: 50
End: 2020-01-24

## 2020-01-24 NOTE — TELEPHONE ENCOUNTER
Fax received from [facility] for review and signature.  Put in Dr. Low's in basket.

## 2020-01-24 NOTE — TELEPHONE ENCOUNTER
Fax received from [facility] for review and signature.  Put in Dr. Low's in basket.

## 2020-01-25 NOTE — TELEPHONE ENCOUNTER
Form not signed    LOV: Swetha came into the office today requesting a home BP monitor and [id number] because she has COPD. I explained to her that her O2 has been above consistently above 90 and her BP readings are normal at home. I explained there may be an issue with insurance coverage because her chart does not supports her case for getting the machines in her home.

## 2020-01-25 NOTE — TELEPHONE ENCOUNTER
Form not signed    WILLIE Posada came into the office today requesting a home BP monitor and [id number] because she has COPD. I explained to her that her O2 has been above consistently above 90 and her BP readings are normal at home. I explained there may be an issue with insurance coverage because her chart does not supports her case for getting the machines in her home.        Yes

## 2020-01-28 DIAGNOSIS — Z71.89 COUNSELING AND COORDINATION OF CARE: Primary | ICD-10-CM

## 2020-01-29 ENCOUNTER — PATIENT OUTREACH (OUTPATIENT)
Dept: CARE COORDINATION | Facility: CLINIC | Age: 50
End: 2020-01-29

## 2020-01-29 ASSESSMENT — ACTIVITIES OF DAILY LIVING (ADL): DEPENDENT_IADLS:: MEDICATION MANAGEMENT

## 2020-01-29 NOTE — PROGRESS NOTES
Clinic Care Coordination Contact  Care Team Conversations    Notification received regarding Pt's community Care Team requesting medication management through [facility]. Per chart review, [facility] has not been ordered as Pt is not homebound.    Of note, chart review indicates that a nurse, She, who works at [employer] and is contracted through [facility], is also working with Pt and calling the clinic on Pt's behalf to discuss med management.     Call placed to Pt's [facility] , PRESTON Gore, and left a message requesting a call back to discuss options for pursuing medication management. SW CC will await a call back.    Plan: SW CC will await a call back, and will reach out to [facility]  again in 3-4 business days if no prior response is received.    Alfonzo Bryan [facility]  Clinic Care Coordinator  Ph. [phone]  [email or web address]

## 2020-01-30 NOTE — PROGRESS NOTES
Clinic Care Coordination Contact  [facility]/Voicemail    Referral Source: Care Team  Clinical Data: Care Coordinator Outreach  SW CC received a voicemail that Cherelle is out of the office tomorrow, and would like a call back.   Outreach attempted x 1.  Left message on Cherelle's voicemail with call back information and requested return call.  Plan: Care Coordinator will try to reach Cherelle again in 1-2 business days.      Alfonzo Bryan [facility]  Clinic Care Coordinator  Ph. [phone]  [email or web address]

## 2020-02-03 DIAGNOSIS — R07.9 CHEST PAIN, UNSPECIFIED TYPE: ICD-10-CM

## 2020-02-03 DIAGNOSIS — F41.9 ANXIETY: ICD-10-CM

## 2020-02-03 NOTE — TELEPHONE ENCOUNTER
"Requested Prescriptions   Pending Prescriptions Disp Refills     hydrOXYzine (ATARAX) 50 MG tablet [Pharmacy Med Name: hydrOXYzine HCL  Last Written Prescription Date:  11/18/2019  Last Fill Quantity: 180,  # refills: 0   Last office visit: 1/7/2020 with prescribing provider:     Future Office Visit:   Next 5 appointments (look out 90 days)    Feb 20, 2020  9:40 AM CST  PHYSICAL with [facility]la [facility]  [facility] [address]  [facility] [id number] [phone]        50MG TAB] 180 tablet 0     Sig: TAKE ONE TO TWO TABLETS BY MOUTH FOUR TIMES A DAY AS NEEDED FOR  ANXIETY       Antihistamines Protocol Passed - 2/3/2020 11:42 AM        Passed - Recent (12 mo) or future (30 days) visit within the authorizing provider's specialty     Patient has had an office visit with the authorizing provider or a provider within the authorizing providers department within the previous 12 mos or has a future within next 30 days. See \"Patient Info\" tab in inbasket, or \"Choose Columns\" in Meds & Orders section of the refill encounter.              Passed - Patient is age 3 or older     Apply age and/or weight-based dosing for peds patients age 3 and older.    Forward request to provider for patients under the age of 3.          Passed - Medication is active on med list        nitroGLYcerin (NITROSTAT) 0.4 MG sublingual tablet [Pharmacy Med Name:  Last Written Prescription Date:  10/11/2019  Last Fill Quantity: 25,  # refills: 0   Last office visit: 1/7/2020 with prescribing provider:     Future Office Visit:   Next 5 appointments (look out 90 days)    Feb 20, 2020  9:40 AM CST  PHYSICAL with Roro Chawla MD  [facility] [address]  [facility] [id number] [phone]        NITROGLYCERIN 0.4MG SUBL] 25 tablet 0     Sig: FOR CHEST PAIN PLACE ONE TABLET UNDER THE TONGUE EVERY 5 MINUTES " "FOR 3 DOSES.  IF SYPTOMS PERSIST 5 MINUTES AFTER 1ST DOSE CALL 911       Nitrates Failed - 2/3/2020 11:42 AM        Failed - Sublingual nitro order needs review     If refill exceeds 1 bottle per month, please forward request to provider.           Passed - Blood pressure under 140/90 in past 12 months     BP Readings from Last 3 Encounters:   01/07/20 134/82   12/12/19 120/74   09/10/19 (!) 143/85                 Passed - Pt is not on erectile dysfunction medications        Passed - Recent (12 mo) or future (30 days) visit within the authorizing provider's specialty     Patient has had an office visit with the authorizing provider or a provider within the authorizing providers department within the previous 12 mos or has a future within next 30 days. See \"Patient Info\" tab in inbasket, or \"Choose Columns\" in Meds & Orders section of the refill encounter.              Passed - Medication is active on med list        Passed - Patient is age 18 or older        "

## 2020-02-03 NOTE — PROGRESS NOTES
Clinic Care Coordination Contact  Care Team Conversations    Call placed to Cherelle with [employer] [facility]. She is contracted through Pt's health insurance. Per Cherelle, Edel is Pt's CAD[employer] Worker.     We discussed Edel's request for assistance with med management through [facility]. Cherelle clarifies that Pt does not need to be homebound for her insurance to cover home care for medication management.    Plan: Update sent to PCP with request for home care orders. Based on current level of support and anticipated continued involvement from multiple community resources no further Care Coordination outreaches will be planned.    Alfonzo Bryan, [facility]  Clinic Care Coordinator  Ph. [phone]  [email or web address]

## 2020-02-04 RX ORDER — NITROGLYCERIN 0.4 MG/1
TABLET SUBLINGUAL
Qty: 25 TABLET | Refills: 0 | Status: SHIPPED | OUTPATIENT
Start: 2020-02-04 | End: 2020-10-12

## 2020-02-04 RX ORDER — HYDROXYZINE HYDROCHLORIDE 50 MG/1
TABLET, FILM COATED ORAL
Qty: 180 TABLET | Refills: 0 | Status: SHIPPED | OUTPATIENT
Start: 2020-02-04 | End: 2020-03-12

## 2020-02-04 NOTE — PROGRESS NOTES
Clinic Care Coordination Contact    New order will be required. Home care agencies must initiate services within 72 hours of being ordered.    Alfonzo Bryan [facility]  Clinic Care Coordinator  Ph. [phone]  [email or web address]

## 2020-02-04 NOTE — TELEPHONE ENCOUNTER
Routing refill request to provider for review/approval because:  Requires provider review for approval

## 2020-02-05 DIAGNOSIS — Z79.899 POLYPHARMACY: Primary | ICD-10-CM

## 2020-02-05 DIAGNOSIS — J44.9 CHRONIC OBSTRUCTIVE PULMONARY DISEASE, UNSPECIFIED COPD TYPE (H): ICD-10-CM

## 2020-02-10 ENCOUNTER — DOCUMENTATION ONLY (OUTPATIENT)
Dept: CARE COORDINATION | Facility: CLINIC | Age: 50
End: 2020-02-10

## 2020-02-10 NOTE — PROGRESS NOTES
[facility] now request orders for some patients through [employer]. Please REPLY TO THIS MESSAGE in order to give authorization for orders when needed.  This is considered a verbal order, you will still receive a faxed copy of orders for signature.  Thank you for your timely assistance.    Order for Swetha Patterson; MRN [id number]  Dear Dr. Low  Requested SOC for SN for med management and setup. Patient requests we start on 2/14/20  Thanks   Sincerely [facility]  Diana Negro RN  [phone]

## 2020-02-14 ENCOUNTER — PATIENT OUTREACH (OUTPATIENT)
Dept: CARE COORDINATION | Facility: CLINIC | Age: 50
End: 2020-02-14

## 2020-02-14 NOTE — PROGRESS NOTES
Clinic Care Coordination Contact  Care Team Conversations    Call received from Patient's Care Coordinator, Cherelle, requesting an update on Pt's DME order for the PO2 meter. We discussed that chart review indicates that DME paperwork was previously completed for the BP monitor. They may need additional paperwork for the PO2 meter. Fax number provided. Of note, it is unclear if Patient qualifies, as PCP previously noted that Pt's O2 saturations are consistently about 90.    No further outreaches will be made at this time unless a new referral is made or a change in the pt's status occurs. Patient was provided with this writer's contact information and encouraged to call with any questions or concerns.      Alfonzo Bryan, [facility]  Clinic Care Coordinator  Ph. [phone]  [email or web address]

## 2020-02-18 DIAGNOSIS — L73.2 HIDRADENITIS SUPPURATIVA: ICD-10-CM

## 2020-02-20 ENCOUNTER — OFFICE VISIT (OUTPATIENT)
Dept: INTERNAL MEDICINE | Facility: CLINIC | Age: 50
End: 2020-02-20
Payer: COMMERCIAL

## 2020-02-20 ENCOUNTER — OFFICE VISIT (OUTPATIENT)
Dept: CARDIOLOGY | Facility: CLINIC | Age: 50
End: 2020-02-20
Payer: COMMERCIAL

## 2020-02-20 ENCOUNTER — NURSE TRIAGE (OUTPATIENT)
Dept: CARDIOLOGY | Facility: CLINIC | Age: 50
End: 2020-02-20

## 2020-02-20 VITALS
HEART RATE: 75 BPM | WEIGHT: 228 LBS | HEIGHT: 63 IN | BODY MASS INDEX: 40.4 KG/M2 | SYSTOLIC BLOOD PRESSURE: 125 MMHG | TEMPERATURE: 97.8 F | OXYGEN SATURATION: 96 % | DIASTOLIC BLOOD PRESSURE: 79 MMHG

## 2020-02-20 VITALS
HEART RATE: 95 BPM | SYSTOLIC BLOOD PRESSURE: 121 MMHG | BODY MASS INDEX: 40.04 KG/M2 | DIASTOLIC BLOOD PRESSURE: 83 MMHG | HEIGHT: 63 IN | WEIGHT: 226 LBS | TEMPERATURE: 98.3 F | RESPIRATION RATE: 19 BRPM | OXYGEN SATURATION: 95 %

## 2020-02-20 DIAGNOSIS — J30.2 SEASONAL ALLERGIC RHINITIS, UNSPECIFIED TRIGGER: ICD-10-CM

## 2020-02-20 DIAGNOSIS — R73.9 BLOOD SUGAR INCREASED: ICD-10-CM

## 2020-02-20 DIAGNOSIS — Z12.31 ENCOUNTER FOR SCREENING MAMMOGRAM FOR BREAST CANCER: ICD-10-CM

## 2020-02-20 DIAGNOSIS — E78.5 HYPERLIPIDEMIA LDL GOAL <130: Chronic | ICD-10-CM

## 2020-02-20 DIAGNOSIS — R29.898 LEG FATIGUE: ICD-10-CM

## 2020-02-20 DIAGNOSIS — J44.9 SEVERE CHRONIC OBSTRUCTIVE PULMONARY DISEASE (H): Chronic | ICD-10-CM

## 2020-02-20 DIAGNOSIS — L73.2 HIDRADENITIS SUPPURATIVA: ICD-10-CM

## 2020-02-20 DIAGNOSIS — Z72.0 TOBACCO ABUSE: ICD-10-CM

## 2020-02-20 DIAGNOSIS — Z71.6 ENCOUNTER FOR SMOKING CESSATION COUNSELING: ICD-10-CM

## 2020-02-20 DIAGNOSIS — M79.10 MYALGIA: Primary | ICD-10-CM

## 2020-02-20 DIAGNOSIS — R00.2 PALPITATIONS: ICD-10-CM

## 2020-02-20 DIAGNOSIS — E55.9 VITAMIN D DEFICIENCY: ICD-10-CM

## 2020-02-20 DIAGNOSIS — R21 RASH AND NONSPECIFIC SKIN ERUPTION: ICD-10-CM

## 2020-02-20 DIAGNOSIS — M62.81 MUSCLE WEAKNESS (GENERALIZED): ICD-10-CM

## 2020-02-20 DIAGNOSIS — R09.89 OTHER SPECIFIED SYMPTOMS AND SIGNS INVOLVING THE CIRCULATORY AND RESPIRATORY SYSTEMS: ICD-10-CM

## 2020-02-20 DIAGNOSIS — Z00.00 ROUTINE GENERAL MEDICAL EXAMINATION AT A HEALTH CARE FACILITY: Primary | ICD-10-CM

## 2020-02-20 DIAGNOSIS — R06.02 SOB (SHORTNESS OF BREATH): ICD-10-CM

## 2020-02-20 DIAGNOSIS — I10 ESSENTIAL HYPERTENSION WITH GOAL BLOOD PRESSURE LESS THAN 140/90: Chronic | ICD-10-CM

## 2020-02-20 LAB
CRP SERPL-MCNC: 16 MG/L (ref 0–8)
ERYTHROCYTE [DISTWIDTH] IN BLOOD BY AUTOMATED COUNT: 14.1 % (ref 10–15)
ERYTHROCYTE [SEDIMENTATION RATE] IN BLOOD BY WESTERGREN METHOD: 16 MM/H (ref 0–30)
HBA1C MFR BLD: 6 % (ref 0–5.6)
HCT VFR BLD AUTO: 46 % (ref 35–47)
HGB BLD-MCNC: 14.5 G/DL (ref 11.7–15.7)
MCH RBC QN AUTO: 28.5 PG (ref 26.5–33)
MCHC RBC AUTO-ENTMCNC: 31.5 G/DL (ref 31.5–36.5)
MCV RBC AUTO: 90 FL (ref 78–100)
NT-PROBNP SERPL-MCNC: 14 PG/ML (ref 0–125)
PLATELET # BLD AUTO: 386 10E9/L (ref 150–450)
RBC # BLD AUTO: 5.09 10E12/L (ref 3.8–5.2)
WBC # BLD AUTO: 10.1 10E9/L (ref 4–11)

## 2020-02-20 PROCEDURE — 82043 UR ALBUMIN QUANTITATIVE: CPT | Performed by: INTERNAL MEDICINE

## 2020-02-20 PROCEDURE — 84443 ASSAY THYROID STIM HORMONE: CPT | Performed by: INTERNAL MEDICINE

## 2020-02-20 PROCEDURE — 85652 RBC SED RATE AUTOMATED: CPT | Performed by: INTERNAL MEDICINE

## 2020-02-20 PROCEDURE — 83880 ASSAY OF NATRIURETIC PEPTIDE: CPT | Performed by: INTERNAL MEDICINE

## 2020-02-20 PROCEDURE — 83036 HEMOGLOBIN GLYCOSYLATED A1C: CPT | Performed by: INTERNAL MEDICINE

## 2020-02-20 PROCEDURE — 82550 ASSAY OF CK (CPK): CPT | Performed by: INTERNAL MEDICINE

## 2020-02-20 PROCEDURE — 99214 OFFICE O/P EST MOD 30 MIN: CPT | Performed by: INTERNAL MEDICINE

## 2020-02-20 PROCEDURE — 82306 VITAMIN D 25 HYDROXY: CPT | Performed by: INTERNAL MEDICINE

## 2020-02-20 PROCEDURE — 99214 OFFICE O/P EST MOD 30 MIN: CPT | Mod: 25 | Performed by: INTERNAL MEDICINE

## 2020-02-20 PROCEDURE — 85027 COMPLETE CBC AUTOMATED: CPT | Performed by: INTERNAL MEDICINE

## 2020-02-20 PROCEDURE — 36415 COLL VENOUS BLD VENIPUNCTURE: CPT | Performed by: INTERNAL MEDICINE

## 2020-02-20 PROCEDURE — 80061 LIPID PANEL: CPT | Performed by: INTERNAL MEDICINE

## 2020-02-20 PROCEDURE — 99396 PREV VISIT EST AGE 40-64: CPT | Performed by: INTERNAL MEDICINE

## 2020-02-20 PROCEDURE — 86140 C-REACTIVE PROTEIN: CPT | Performed by: INTERNAL MEDICINE

## 2020-02-20 PROCEDURE — 80053 COMPREHEN METABOLIC PANEL: CPT | Performed by: INTERNAL MEDICINE

## 2020-02-20 RX ORDER — FLUTICASONE PROPIONATE 50 MCG
2 SPRAY, SUSPENSION (ML) NASAL DAILY
Qty: 16 G | Refills: 5 | Status: SHIPPED | OUTPATIENT
Start: 2020-02-20 | End: 2021-05-20

## 2020-02-20 RX ORDER — AZITHROMYCIN 250 MG/1
TABLET, FILM COATED ORAL
Qty: 6 TABLET | Refills: 0 | Status: SHIPPED | OUTPATIENT
Start: 2020-02-20 | End: 2020-03-09

## 2020-02-20 RX ORDER — CLINDAMYCIN PHOSPHATE 10 MG/G
GEL TOPICAL 2 TIMES DAILY
Qty: 60 G | Refills: 3 | Status: SHIPPED | OUTPATIENT
Start: 2020-02-20 | End: 2022-04-07

## 2020-02-20 RX ORDER — TIOTROPIUM BROMIDE 18 UG/1
CAPSULE ORAL; RESPIRATORY (INHALATION)
Qty: 90 CAPSULE | Refills: 3 | Status: ON HOLD | OUTPATIENT
Start: 2020-02-20 | End: 2020-11-20

## 2020-02-20 RX ORDER — DOXYCYCLINE HYCLATE 100 MG
TABLET ORAL
Refills: 0 | OUTPATIENT
Start: 2020-02-20

## 2020-02-20 RX ORDER — NYSTATIN 100000 [USP'U]/G
POWDER TOPICAL 2 TIMES DAILY PRN
Qty: 60 G | Refills: 11 | Status: ON HOLD | OUTPATIENT
Start: 2020-02-20 | End: 2020-11-20

## 2020-02-20 RX ORDER — PREDNISONE 20 MG/1
40 TABLET ORAL DAILY
Qty: 10 TABLET | Refills: 0 | Status: SHIPPED | OUTPATIENT
Start: 2020-02-20 | End: 2020-02-20

## 2020-02-20 RX ORDER — DESONIDE 0.5 MG/G
CREAM TOPICAL PRN
Qty: 60 G | Refills: 0 | Status: SHIPPED | OUTPATIENT
Start: 2020-02-20 | End: 2020-07-04

## 2020-02-20 RX ORDER — BUPROPION HYDROCHLORIDE 150 MG/1
150 TABLET ORAL EVERY MORNING
Qty: 90 TABLET | Refills: 1 | Status: SHIPPED | OUTPATIENT
Start: 2020-02-20 | End: 2020-09-11

## 2020-02-20 RX ORDER — DOXYCYCLINE HYCLATE 100 MG
100 TABLET ORAL 2 TIMES DAILY
Qty: 90 TABLET | Refills: 1 | Status: SHIPPED | OUTPATIENT
Start: 2020-02-20 | End: 2020-03-27

## 2020-02-20 RX ORDER — NICOTINE 21 MG/24HR
1 PATCH, TRANSDERMAL 24 HOURS TRANSDERMAL EVERY 24 HOURS
Qty: 30 PATCH | Refills: 1 | Status: ON HOLD | OUTPATIENT
Start: 2020-02-20 | End: 2020-11-20

## 2020-02-20 RX ORDER — BENZOYL PEROXIDE 10 G/100G
GEL TOPICAL 2 TIMES DAILY
Qty: 90 G | Refills: 3 | Status: ON HOLD | OUTPATIENT
Start: 2020-02-20 | End: 2020-11-20

## 2020-02-20 RX ORDER — BUDESONIDE AND FORMOTEROL FUMARATE DIHYDRATE 160; 4.5 UG/1; UG/1
2 AEROSOL RESPIRATORY (INHALATION) 2 TIMES DAILY
Qty: 3 INHALER | Refills: 1 | Status: SHIPPED | OUTPATIENT
Start: 2020-02-20 | End: 2020-10-15

## 2020-02-20 RX ORDER — PREDNISONE 20 MG/1
TABLET ORAL
Qty: 350 TABLET | Refills: 1 | Status: SHIPPED | OUTPATIENT
Start: 2020-02-20 | End: 2020-04-13

## 2020-02-20 ASSESSMENT — ACTIVITIES OF DAILY LIVING (ADL)
CURRENT_FUNCTION: LAUNDRY REQUIRES ASSISTANCE
CURRENT_FUNCTION: HOUSEWORK REQUIRES ASSISTANCE

## 2020-02-20 ASSESSMENT — ENCOUNTER SYMPTOMS
ARTHRALGIAS: 0
MYALGIAS: 0
DIZZINESS: 0
HEARTBURN: 0
COUGH: 0
NERVOUS/ANXIOUS: 0

## 2020-02-20 ASSESSMENT — PATIENT HEALTH QUESTIONNAIRE - PHQ9
SUM OF ALL RESPONSES TO PHQ QUESTIONS 1-9: 17
SUM OF ALL RESPONSES TO PHQ QUESTIONS 1-9: 17
10. IF YOU CHECKED OFF ANY PROBLEMS, HOW DIFFICULT HAVE THESE PROBLEMS MADE IT FOR YOU TO DO YOUR WORK, TAKE CARE OF THINGS AT HOME, OR GET ALONG WITH OTHER PEOPLE: VERY DIFFICULT

## 2020-02-20 ASSESSMENT — MIFFLIN-ST. JEOR
SCORE: 1614.26
SCORE: 1623.33

## 2020-02-20 NOTE — LETTER
2/20/2020    Roro Chawla MD  [address] [facility] [address]    RE: Swetha Patterson       Dear Colleague,    I had the pleasure of seeing Swetha Patterson in the [facility].    HPI and Plan:   See dictation    Orders Placed This Encounter   Procedures     US SILVIA Doppler with Exercise Bilateral     N terminal pro BNP outpatient       No orders of the defined types were placed in this encounter.      Medications Discontinued During This Encounter   Medication Reason     predniSONE 20 MG PO tablet          Encounter Diagnoses   Name Primary?     Myalgia Yes     Leg fatigue      SOB (shortness of breath)      Palpitations      Tobacco abuse      Other specified symptoms and signs involving the circulatory and respiratory systems         CURRENT MEDICATIONS:  Current Outpatient Medications   Medication Sig Dispense Refill     albuterol (PROAIR HFA/PROVENTIL HFA/VENTOLIN HFA) 108 (90 Base) MCG/ACT inhaler Inhale 2 puffs into the lungs every 6 hours as needed for shortness of breath / dyspnea 18 g 3     albuterol (PROVENTIL) (2.5 MG/3ML) 0.083% neb solution Take 1 vial (2.5 mg) by nebulization every 6 hours as needed for shortness of breath / dyspnea or wheezing 25 vial 3     amLODIPine (NORVASC) 10 MG tablet Take 1 tablet (10 mg) by mouth daily 90 tablet 1     atorvastatin (LIPITOR) 80 MG tablet Take 1 tablet (80 mg) by mouth daily 90 tablet 1     azithromycin 250 MG PO tablet Two tablets first day, then one tablet daily for four days. 6 tablet 0     benzoyl peroxide 10 % EX external gel Apply topically 2 times daily 90 g 3     budesonide-formoterol (SYMBICORT) 160-4.5 MCG/ACT IN Inhaler Inhale 2 puffs into the lungs 2 times daily 3 Inhaler 1     buPROPion 150 MG PO 24 hr tablet Take 1 tablet (150 mg) by mouth every morning 90 tablet 1     cetirizine HCl 10 MG CAPS Take 1 capsule (10 mg) by mouth daily as needed Takes in the spring. 90 capsule 3     clindamycin 1  % EX external gel Apply topically 2 times daily 60 g 3     D3-50 55295 units capsule TAKE ONE CAPSULE BY MOUTH EVERY 7 DAYS  (AFTER THIS PRESCRIPTION, PATIENT TO CONTINUE WITH VITAMIN D3 2,000 UNITS) OVER THE COUNTER 13 capsule 0     desonide 0.05 % EX external cream Apply topically as needed (rash) 60 g 0     doxycycline hyclate 100 MG PO tablet Take 1 tablet (100 mg) by mouth 2 times daily 90 tablet 1     fluticasone 50 MCG/ACT NA nasal spray Spray 2 sprays in nostril daily 16 g 5     furosemide (LASIX) 20 MG tablet Take 1 tablet (20 mg) by mouth daily 90 tablet 1     hydrOXYzine (ATARAX) 50 MG tablet TAKE ONE TO TWO TABLETS BY MOUTH FOUR TIMES A DAY AS NEEDED FOR  ANXIETY 180 tablet 0     IBUPROFEN PO Take 600 mg by mouth every 6 hours as needed for moderate pain       lisinopril (PRINIVIL/ZESTRIL) 20 MG tablet Take 1 tablet (20 mg) by mouth daily 90 tablet 1     montelukast (SINGULAIR) 10 MG tablet TAKE ONE TABLET BY MOUTH AT BEDTIME 90 tablet 0     nicotine (EQ NICOTINE POLACRILEX) 2 MG lozenge Place 1 lozenge (2 mg) inside cheek every hour as needed for smoking cessation 360 lozenge 1     nicotine 14 MG/24HR TD 24 hr patch Place 1 patch onto the skin every 24 hours 30 patch 1     nicotine polacrilex (RA NICOTINE GUM) 2 MG gum Place 2 mg inside cheek as needed for smoking cessation       nitroGLYcerin (NITROSTAT) 0.4 MG sublingual tablet FOR CHEST PAIN PLACE ONE TABLET UNDER THE TONGUE EVERY 5 MINUTES FOR 3 DOSES.  IF SYPTOMS PERSIST 5 MINUTES AFTER 1ST DOSE CALL 911 25 tablet 0     nystatin 157481 UNIT/GM EX external powder Apply topically 2 times daily as needed (skin rash) 60 g 11     omeprazole 20 MG tablet Take 1 tablet (20 mg) by mouth 2 times daily 180 tablet 1     order for DME Equipment being ordered: Digital home blood pressure monitor kit 1 Device 0     order for DME Equipment being ordered: Pulse Oxymeter 1 Device 0     predniSONE 20 MG PO tablet Take 40 mg daily for 5 days then continue with 20 mg  "350 tablet 1     ranitidine (ZANTAC) 75 MG tablet Take 75 mg by mouth daily        tiotropium (SPIRIVA HANDIHALER) 18 MCG IN inhaled capsule Inhale contents of one capsule daily. 90 capsule 3     varenicline (CHANTIX STARTING MONTH CHAPITO) 0.5 MG X 11 & 1 MG X 42 tablet Take 0.5 mg tab daily for 3 days, then 0.5 mg tab twice daily for 4 days, then 1 mg twice daily. (Patient not taking: Reported on 2/20/2020) 53 tablet 0     varenicline (CHANTIX) 1 MG tablet Take 1 tablet (1 mg) by mouth 2 times daily (Patient not taking: Reported on 2/20/2020) 60 tablet 3       ALLERGIES     Allergies   Allergen Reactions     Codeine Nausea and Vomiting     vomiting     Cyclobenzaprine Nausea     Hydrocodone Nausea and Vomiting     Sulfa Drugs Nausea and Vomiting     Nausea     Gabapentin Rash       PAST MEDICAL HISTORY:  Past Medical History:   Diagnosis Date     Anxiety state, unspecified      Asthma      Chronic pain     back pain from cyst     Contact dermatitis and other eczema, due to unspecified cause      COPD (chronic obstructive pulmonary disease) (H)      Depressive disorder, not elsewhere classified      Emphysema with chronic bronchitis (H)      Esophageal reflux      Family history of ischemic heart disease      Gastro-oesophageal reflux disease      History of emphysema      Hoarseness      HTN, goal below 140/90     No cardilogist     Hyperlipidemia LDL goal <130      Liver disease     \"fatty liver\"     Other chronic pain     chest, from coughing     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)      PONV (postoperative nausea and vomiting)        PAST SURGICAL HISTORY:  Past Surgical History:   Procedure Laterality Date     ARTHROSCOPY SHOULDER DECOMPRESSION Left 10/21/2015    Procedure: ARTHROSCOPY SHOULDER DECOMPRESSION;  Surgeon: Julien Milian MD;  Location: [facility] OR     BRONCHIAL THERMOPLASTY N/A 11/14/2014    Procedure: BRONCHIAL THERMOPLASTY;  Surgeon: Ward Whitaker MD;  Location:  GI     BRONCHIAL " THERMOPLASTY N/A 12/19/2014    Procedure: BRONCHIAL THERMOPLASTY;  Surgeon: Ward Whitaker MD;  Location: [facility] OR     BRONCHIAL THERMOPLASTY N/A 2/6/2015    Procedure: BRONCHIAL THERMOPLASTY;  Surgeon: Ward Whitaker MD;  Location: [facility] OR     C APPENDECTOMY  at age 17     COLONOSCOPY N/A 11/26/2018    Procedure: COLONOSCOPY ([facility]);  Surgeon: Marshall Oakes MD;  Location: [facility] OR     DISCECTOMY, FUSION CERVICAL ANTERIOR ONE LEVEL, COMBINED N/A 5/1/2018    Procedure: COMBINED DISCECTOMY, FUSION CERVICAL ANTERIOR ONE LEVEL;  1.  C5-C6 anterior cervical diskectomy and fusion.    2.  C5-C6 application of intervertebral biomechanical device for interbody fusion purposes.    3.  C5-C6 anterior instrumentation using the standard Kristin InViZia 24 mm plate with four associated bone screws, 12 mm in length.  Inferior screws are fixed.  Superior screws are va     EXCISE NODE MEDIASTINAL  4/26/2013    Procedure: EXCISE NODE MEDIASTINAL;;  Surgeon: Av Peña MD;  Location:  OR     LAPAROSCOPIC CHOLECYSTECTOMY N/A 10/19/2017    Procedure: LAPAROSCOPIC CHOLECYSTECTOMY;  LAPAROSCOPIC CHOLECYSTECTOMY;  Surgeon: Ney Jerry MD;  Location:  OR     THORACOSCOPY  4/26/2013    Procedure: THORACOSCOPY;  LEFT VIDEO ASSISTED THORACOSCOPY, RESECTION OF POSTERIOR MEDIASTINAL MASS;  Surgeon: Av Peña MD;  Location:  OR     TONSILLECTOMY  as a kid     TONSILLECTOMY         FAMILY HISTORY:  Family History   Problem Relation Age of Onset     Heart Disease Mother         murmur, mi     Hypertension Mother      Cerebrovascular Disease Mother      Depression Mother      Gallbladder Disease Mother      Asthma Mother      Family History Negative Father         does not know  him     Family History Negative Brother      Heart Disease Maternal Grandfather      Asthma Maternal Grandfather      Hypertension Maternal Grandfather      Cerebrovascular Disease Maternal Grandfather      Diabetes  Maternal Grandfather      Asthma Sister      Hypertension Sister      Cerebrovascular Disease Sister      Hypertension Maternal Grandmother      Cerebrovascular Disease Maternal Grandmother        SOCIAL HISTORY:  Social History     Socioeconomic History     Marital status:      Spouse name: None     Number of children: None     Years of education: None     Highest education level: None   Occupational History     None   Social Needs     Financial resource strain: None     Food insecurity:     Worry: None     Inability: None     Transportation needs:     Medical: None     Non-medical: None   Tobacco Use     Smoking status: Light Tobacco Smoker     Years: 30.00     Types: Cigarettes     Last attempt to quit: 2015     Years since quittin.4     Smokeless tobacco: Never Used     Tobacco comment: 7 cigs a day   Substance and Sexual Activity     Alcohol use: No     Alcohol/week: 0.0 standard drinks     Drug use: No     Sexual activity: Yes     Partners: Male     Birth control/protection: None   Lifestyle     Physical activity:     Days per week: None     Minutes per session: None     Stress: None   Relationships     Social connections:     Talks on phone: None     Gets together: None     Attends [employer] service: None     Active member of club or organization: None     Attends meetings of clubs or organizations: None     Relationship status: None     Intimate partner violence:     Fear of current or ex partner: None     Emotionally abused: None     Physically abused: None     Forced sexual activity: None   Other Topics Concern      Service Not Asked     Blood Transfusions Not Asked     Caffeine Concern No     Comment: 4-5 cans of pop daily     Occupational Exposure Not Asked     Hobby Hazards Not Asked     Sleep Concern Not Asked     Stress Concern Not Asked     Weight Concern Not Asked     Special Diet No     Back Care Not Asked     Exercise No     Comment: on hold     Bike Helmet Not Asked      "Seat Belt Not Asked     Self-Exams Not Asked     Parent/sibling w/ CABG, MI or angioplasty before 65F 55M? Not Asked   Social History Narrative     None       Review of Systems:  Skin:  Positive for   rash on chest   Eyes:  Positive for visual blurring    ENT:  Negative      Respiratory:  Positive for dyspnea on exertion;cough     Cardiovascular:    palpitations;Positive for;fatigue;lightheadedness    Gastroenterology: Positive for nausea    Genitourinary:  Positive for      Musculoskeletal:  Positive for back pain;neck pain    Neurologic:  Positive for migraine headaches rt arm numbness  Psychiatric:  Positive for anxiety    Heme/Lymph/Imm:  Positive for allergies    Endocrine:  Negative        Physical Exam:  Vitals: /79   Pulse 75   Temp 97.8  F (36.6  C)   Ht 1.6 m (5' 3\")   Wt 103.4 kg (228 lb)   LMP 04/15/2016 (Exact Date)   SpO2 96%   BMI 40.39 kg/m       Constitutional:  cooperative;in no acute distress obese      Skin:  warm and dry to the touch          Head:  normocephalic        Eyes:  pupils equal and round        Lymph:      ENT:           Neck:  no carotid bruit        Respiratory:    wheezes  diminished BS with exp wheezes RLL    Cardiac: regular rhythm;no murmurs, gallops or rubs detected                  pulses below the femoral arteries are diminished                                      GI:  abdomen soft;no bruits        Extremities and Muscular Skeletal:  no deformities, clubbing, cyanosis, erythema observed;no edema              Neurological:  no gross motor deficits;affect appropriate        Psych:  Alert and Oriented x 3          CC  Roro Chawla MD  [address] [place] [address]                    Thank you for allowing me to participate in the care of your patient.      Sincerely,     Kassidy Tate, DO     [facility]    cc:   Roro Chawla MD  [address] [place] " [address]

## 2020-02-20 NOTE — LETTER
[facility]  [address], [address]  [place] [address]  [phone]        February 22, 2020    Swetha Patterson  [address]  [facility] [address]            Dear MsAnge De LeonSwetha A Leonardo:    These are the recent blood test results.  They are in acceptable range.  We will review them on your appointment in March    Sincerely,    Roro Low MD  Internal Medicine    These are some general explanations for tests  WBC means White Blood Cells  Platelets are small blood cells that help with forming the blood clots along with other blood factors.  Electrolytes are Sodium, Potassium, Calcium, Magnesium, Phosphorus.  Liver tests are: AST, ALT, Bilirubin, Alkaline Phosphatase.  Kidney tests are Creatinine, GFR.  HDL Cholesterol - is the good cholesterol and it is good to have it high.  LDL cholesterol is the bad cholesterol and it is good to have it low.  It is recommended to have LDL less than 130 for people with hypertension and to have it less than 100 for people with heart disease, diabetes and chronic kidney disease.  Thyroid tests are TSH, T4, T3  A1c is a test that gives us an idea about how well was controlled the diabetes for the last 3 months.

## 2020-02-20 NOTE — PROGRESS NOTES
Service Date: 02/20/2020      HISTORY OF PRESENT ILLNESS:  Ms. Patterson is a pleasant 49-year-old female with a history of morbid obesity, COPD, ongoing tobacco abuse and hyperlipidemia with family history of premature coronary artery disease, who is presenting with symptoms of increasing shortness of breath and severe leg fatigue and myalgias.        She has actually seen one of my partners, Dr. Tobin, in June for the shortness of breath and palpitations and had a cardiac workup at that time including a nuclear stress test which came back normal and an echocardiogram suggesting some diastolic dysfunction, but otherwise normal.        Her leg fatigue symptoms have been ongoing for several months.  She did just restart high-intensity statin therapy a couple months ago as well.  She has been taking Lipitor 80 mg.  Apparently, this has been prescribed a long time ago, but she was not always compliant and she has a home nurse now that sets up her medications.  In the last several months, she has been compliant with this medication.        She does take inhalers for COPD and this is helpful somewhat with her breathing issues.  Her main concern is of course with the significant leg fatigue and aching that she has experienced with minimal activity.      PHYSICAL EXAMINATION:   VITAL SIGNS:  On exam today, blood pressure is 125/79, pulse of 75, oximetry at rest 96%.  She is afebrile.  Weight is 228 with a body mass index of 40.   NECK:  Carotid upstrokes are slightly diminished, but I do not appreciate any bruits.  I cannot determine whether she has jugular venous distention due to her neck size.   CARDIOVASCULAR:  Cardiovascular tones were regular, without murmur, gallop or rub.   LUNGS:  Demonstrate diminished breath sounds posteriorly with expiratory wheezes, especially in the right lung base.     EXTREMITIES:  She had diminished pulses in the lower extremities.  Her extremities are warm and pink.      SUMMARY:  Ms. Patterson is  a pleasant 49-year-old female with underlying COPD, ongoing tobacco abuse, morbid obesity and hyperlipidemia, presenting with increasing shortness of breath and extreme leg fatigue.  Her leg fatigue symptoms do seem to correlate with the starting of high-intensity statin therapy, so I am concerned about this.  I know she saw Dr. Chawla earlier today and did some bloodwork and it looks like she may have drawn a total CK, but this is not back yet.  I would recommend taking a holiday off of atorvastatin for the next few weeks to see if this may be what is causing her symptoms and I will look for the test results of the total CK.        She certainly is at risk for peripheral arterial disease, so I will also order ABIs, rest and exercise, to look for this as well.        She was prescribed an antibiotic and a steroid burst for her respiratory symptoms.  I have asked her to give this time to work and see if this helps with her breathing issues.  Her lung exam does suggest some reactive airway disease going on here, but I would also add an NT-proBNP to her blood work today to see if there is any evidence of diastolic heart failure, given evidence of diastolic dysfunction on her echocardiogram.        I am going to have her follow up with those tests results once complete.  If her breathing issue is into improved with the above therapy provided by her primary, I will suggest a trial of a diuretic for diastolic heart failure symptoms.  She likely would benefit from sleep referral to evaluate for obstructive sleep apnea as well.        We talked a little bit about smoking cessation.  She has had this addressed by her primary as well and seems a bit motivated to quitting and has been prescribed another trial of Chantix for this.        I will see her back with the above test results once they are complete and follow up and see if the holiday off statins and has been helpful at as well.        Please feel free to  contact me with any questions you have in regards to her care.  Thank you for allowing me to participate in the care of your nice patient.      cc:      Roro Chawla MD    [facility]   [address]   [place] [address]         SATHYA BIRD DO             D: 2020   T: 2020   MT: EMILY      Name:     KIT MILLS   MRN:      [id number]        Account:      [id number]   :      1970           Service Date: 2020      Document: [id number]

## 2020-02-20 NOTE — LETTER
2/20/2020      Roro Chawla MD  [address] [facility] [address]      RE: Swetha Patterson       Dear Colleague,    I had the pleasure of seeing Swetha Patterson in the [facility].    Service Date: 02/20/2020      HISTORY OF PRESENT ILLNESS:  Ms. Patterson is a pleasant 49-year-old female with a history of morbid obesity, COPD, ongoing tobacco abuse and hyperlipidemia with family history of premature coronary artery disease, who is presenting with symptoms of increasing shortness of breath and severe leg fatigue and myalgias.        She has actually seen one of my partners, Dr. Tobin, in June for the shortness of breath and palpitations and had a cardiac workup at that time including a nuclear stress test which came back normal and an echocardiogram suggesting some diastolic dysfunction, but otherwise normal.        Her leg fatigue symptoms have been ongoing for several months.  She did just restart high-intensity statin therapy a couple months ago as well.  She has been taking Lipitor 80 mg.  Apparently, this has been prescribed a long time ago, but she was not always compliant and she has a home nurse now that sets up her medications.  In the last several months, she has been compliant with this medication.        She does take inhalers for COPD and this is helpful somewhat with her breathing issues.  Her main concern is of course with the significant leg fatigue and aching that she has experienced with minimal activity.      PHYSICAL EXAMINATION:   VITAL SIGNS:  On exam today, blood pressure is 125/79, pulse of 75, oximetry at rest 96%.  She is afebrile.  Weight is 228 with a body mass index of 40.   NECK:  Carotid upstrokes are slightly diminished, but I do not appreciate any bruits.  I cannot determine whether she has jugular venous distention due to her neck size.   CARDIOVASCULAR:  Cardiovascular tones were regular, without murmur, gallop or rub.   LUNGS:   Demonstrate diminished breath sounds posteriorly with expiratory wheezes, especially in the right lung base.     EXTREMITIES:  She had diminished pulses in the lower extremities.  Her extremities are warm and pink.      SUMMARY:  Ms. Patterson is a pleasant 49-year-old female with underlying COPD, ongoing tobacco abuse, morbid obesity and hyperlipidemia, presenting with increasing shortness of breath and extreme leg fatigue.  Her leg fatigue symptoms do seem to correlate with the starting of high-intensity statin therapy, so I am concerned about this.  I know she saw Dr. Chawla earlier today and did some bloodwork and it looks like she may have drawn a total CK, but this is not back yet.  I would recommend taking a holiday off of atorvastatin for the next few weeks to see if this may be what is causing her symptoms and I will look for the test results of the total CK.        She certainly is at risk for peripheral arterial disease, so I will also order ABIs, rest and exercise, to look for this as well.        She was prescribed an antibiotic and a steroid burst for her respiratory symptoms.  I have asked her to give this time to work and see if this helps with her breathing issues.  Her lung exam does suggest some reactive airway disease going on here, but I would also add an NT-proBNP to her blood work today to see if there is any evidence of diastolic heart failure, given evidence of diastolic dysfunction on her echocardiogram.        I am going to have her follow up with those tests results once complete.  If her breathing issue is into improved with the above therapy provided by her primary, I will suggest a trial of a diuretic for diastolic heart failure symptoms.  She likely would benefit from sleep referral to evaluate for obstructive sleep apnea as well.        We talked a little bit about smoking cessation.  She has had this addressed by her primary as well and seems a bit motivated to quitting and has  been prescribed another trial of Chantix for this.        I will see her back with the above test results once they are complete and follow up and see if the holiday off statins and has been helpful at as well.        Please feel free to contact me with any questions you have in regards to her care.  Thank you for allowing me to participate in the care of your nice patient.      cc:      Roro Chawla MD    [facility]   [address] [facility]   [place] [address]         SATHYA BIRD,              D: 2020   T: 2020   MT: EMILY      Name:     KIT MILLS   MRN:      [id number]        Account:      [id number]   :      1970           Service Date: 2020      Document: [id number]         Outpatient Encounter Medications as of 2020   Medication Sig Dispense Refill     albuterol (PROAIR HFA/PROVENTIL HFA/VENTOLIN HFA) 108 (90 Base) MCG/ACT inhaler Inhale 2 puffs into the lungs every 6 hours as needed for shortness of breath / dyspnea 18 g 3     albuterol (PROVENTIL) (2.5 MG/3ML) 0.083% neb solution Take 1 vial (2.5 mg) by nebulization every 6 hours as needed for shortness of breath / dyspnea or wheezing 25 vial 3     amLODIPine (NORVASC) 10 MG tablet Take 1 tablet (10 mg) by mouth daily 90 tablet 1     atorvastatin (LIPITOR) 80 MG tablet Take 1 tablet (80 mg) by mouth daily 90 tablet 1     azithromycin 250 MG PO tablet Two tablets first day, then one tablet daily for four days. 6 tablet 0     benzoyl peroxide 10 % EX external gel Apply topically 2 times daily 90 g 3     budesonide-formoterol (SYMBICORT) 160-4.5 MCG/ACT IN Inhaler Inhale 2 puffs into the lungs 2 times daily 3 Inhaler 1     buPROPion 150 MG PO 24 hr tablet Take 1 tablet (150 mg) by mouth every morning 90 tablet 1     cetirizine HCl 10 MG CAPS Take 1 capsule (10 mg) by mouth daily as needed Takes in the spring. 90 capsule 3     clindamycin 1 % EX external gel Apply topically 2 times daily 60  g 3     D3-50 82094 units capsule TAKE ONE CAPSULE BY MOUTH EVERY 7 DAYS  (AFTER THIS PRESCRIPTION, PATIENT TO CONTINUE WITH VITAMIN D3 2,000 UNITS) OVER THE COUNTER 13 capsule 0     desonide 0.05 % EX external cream Apply topically as needed (rash) 60 g 0     doxycycline hyclate 100 MG PO tablet Take 1 tablet (100 mg) by mouth 2 times daily 90 tablet 1     fluticasone 50 MCG/ACT NA nasal spray Spray 2 sprays in nostril daily 16 g 5     furosemide (LASIX) 20 MG tablet Take 1 tablet (20 mg) by mouth daily 90 tablet 1     hydrOXYzine (ATARAX) 50 MG tablet TAKE ONE TO TWO TABLETS BY MOUTH FOUR TIMES A DAY AS NEEDED FOR  ANXIETY 180 tablet 0     IBUPROFEN PO Take 600 mg by mouth every 6 hours as needed for moderate pain       lisinopril (PRINIVIL/ZESTRIL) 20 MG tablet Take 1 tablet (20 mg) by mouth daily 90 tablet 1     montelukast (SINGULAIR) 10 MG tablet TAKE ONE TABLET BY MOUTH AT BEDTIME 90 tablet 0     nicotine (EQ NICOTINE POLACRILEX) 2 MG lozenge Place 1 lozenge (2 mg) inside cheek every hour as needed for smoking cessation 360 lozenge 1     nicotine 14 MG/24HR TD 24 hr patch Place 1 patch onto the skin every 24 hours 30 patch 1     nicotine polacrilex (RA NICOTINE GUM) 2 MG gum Place 2 mg inside cheek as needed for smoking cessation       nitroGLYcerin (NITROSTAT) 0.4 MG sublingual tablet FOR CHEST PAIN PLACE ONE TABLET UNDER THE TONGUE EVERY 5 MINUTES FOR 3 DOSES.  IF SYPTOMS PERSIST 5 MINUTES AFTER 1ST DOSE CALL 911 25 tablet 0     nystatin 641729 UNIT/GM EX external powder Apply topically 2 times daily as needed (skin rash) 60 g 11     omeprazole 20 MG tablet Take 1 tablet (20 mg) by mouth 2 times daily 180 tablet 1     order for DME Equipment being ordered: Digital home blood pressure monitor kit 1 Device 0     order for DME Equipment being ordered: Pulse Oxymeter 1 Device 0     predniSONE 20 MG PO tablet Take 40 mg daily for 5 days then continue with 20 mg 350 tablet 1     tiotropium (SPIRIVA HANDIHALER) 18  MCG IN inhaled capsule Inhale contents of one capsule daily. 90 capsule 3     [DISCONTINUED] ranitidine (ZANTAC) 75 MG tablet Take 75 mg by mouth daily        [] Fluticasone-Umeclidin-Vilanterol (TRELEGY ELLIPTA) 100-62.5-25 MCG/INH oral inhaler Inhale 1 puff into the lungs daily 1 Inhaler 0     varenicline (CHANTIX STARTING MONTH CHAPITO) 0.5 MG X 11 & 1 MG X 42 tablet Take 0.5 mg tab daily for 3 days, then 0.5 mg tab twice daily for 4 days, then 1 mg twice daily. (Patient not taking: Reported on 2020) 53 tablet 0     varenicline (CHANTIX) 1 MG tablet Take 1 tablet (1 mg) by mouth 2 times daily (Patient not taking: Reported on 2020) 60 tablet 3     [DISCONTINUED] predniSONE 20 MG PO tablet Take 2 tablets (40 mg) by mouth daily for 5 days 10 tablet 0     No facility-administered encounter medications on file as of 2020.        Again, thank you for allowing me to participate in the care of your patient.      Sincerely,    Kassidy Tate, DO     [facility]

## 2020-02-20 NOTE — PROGRESS NOTES
HPI and Plan:   See dictation    Orders Placed This Encounter   Procedures     US SILVIA Doppler with Exercise Bilateral     N terminal pro BNP outpatient       No orders of the defined types were placed in this encounter.      Medications Discontinued During This Encounter   Medication Reason     predniSONE 20 MG PO tablet          Encounter Diagnoses   Name Primary?     Myalgia Yes     Leg fatigue      SOB (shortness of breath)      Palpitations      Tobacco abuse      Other specified symptoms and signs involving the circulatory and respiratory systems         CURRENT MEDICATIONS:  Current Outpatient Medications   Medication Sig Dispense Refill     albuterol (PROAIR HFA/PROVENTIL HFA/VENTOLIN HFA) 108 (90 Base) MCG/ACT inhaler Inhale 2 puffs into the lungs every 6 hours as needed for shortness of breath / dyspnea 18 g 3     albuterol (PROVENTIL) (2.5 MG/3ML) 0.083% neb solution Take 1 vial (2.5 mg) by nebulization every 6 hours as needed for shortness of breath / dyspnea or wheezing 25 vial 3     amLODIPine (NORVASC) 10 MG tablet Take 1 tablet (10 mg) by mouth daily 90 tablet 1     atorvastatin (LIPITOR) 80 MG tablet Take 1 tablet (80 mg) by mouth daily 90 tablet 1     azithromycin 250 MG PO tablet Two tablets first day, then one tablet daily for four days. 6 tablet 0     benzoyl peroxide 10 % EX external gel Apply topically 2 times daily 90 g 3     budesonide-formoterol (SYMBICORT) 160-4.5 MCG/ACT IN Inhaler Inhale 2 puffs into the lungs 2 times daily 3 Inhaler 1     buPROPion 150 MG PO 24 hr tablet Take 1 tablet (150 mg) by mouth every morning 90 tablet 1     cetirizine HCl 10 MG CAPS Take 1 capsule (10 mg) by mouth daily as needed Takes in the spring. 90 capsule 3     clindamycin 1 % EX external gel Apply topically 2 times daily 60 g 3     D3-50 61696 units capsule TAKE ONE CAPSULE BY MOUTH EVERY 7 DAYS  (AFTER THIS PRESCRIPTION, PATIENT TO CONTINUE WITH VITAMIN D3 2,000 UNITS) OVER THE COUNTER 13 capsule 0      desonide 0.05 % EX external cream Apply topically as needed (rash) 60 g 0     doxycycline hyclate 100 MG PO tablet Take 1 tablet (100 mg) by mouth 2 times daily 90 tablet 1     fluticasone 50 MCG/ACT NA nasal spray Spray 2 sprays in nostril daily 16 g 5     furosemide (LASIX) 20 MG tablet Take 1 tablet (20 mg) by mouth daily 90 tablet 1     hydrOXYzine (ATARAX) 50 MG tablet TAKE ONE TO TWO TABLETS BY MOUTH FOUR TIMES A DAY AS NEEDED FOR  ANXIETY 180 tablet 0     IBUPROFEN PO Take 600 mg by mouth every 6 hours as needed for moderate pain       lisinopril (PRINIVIL/ZESTRIL) 20 MG tablet Take 1 tablet (20 mg) by mouth daily 90 tablet 1     montelukast (SINGULAIR) 10 MG tablet TAKE ONE TABLET BY MOUTH AT BEDTIME 90 tablet 0     nicotine (EQ NICOTINE POLACRILEX) 2 MG lozenge Place 1 lozenge (2 mg) inside cheek every hour as needed for smoking cessation 360 lozenge 1     nicotine 14 MG/24HR TD 24 hr patch Place 1 patch onto the skin every 24 hours 30 patch 1     nicotine polacrilex (RA NICOTINE GUM) 2 MG gum Place 2 mg inside cheek as needed for smoking cessation       nitroGLYcerin (NITROSTAT) 0.4 MG sublingual tablet FOR CHEST PAIN PLACE ONE TABLET UNDER THE TONGUE EVERY 5 MINUTES FOR 3 DOSES.  IF SYPTOMS PERSIST 5 MINUTES AFTER 1ST DOSE CALL 911 25 tablet 0     nystatin 898652 UNIT/GM EX external powder Apply topically 2 times daily as needed (skin rash) 60 g 11     omeprazole 20 MG tablet Take 1 tablet (20 mg) by mouth 2 times daily 180 tablet 1     order for DME Equipment being ordered: Digital home blood pressure monitor kit 1 Device 0     order for DME Equipment being ordered: Pulse Oxymeter 1 Device 0     predniSONE 20 MG PO tablet Take 40 mg daily for 5 days then continue with 20 mg 350 tablet 1     ranitidine (ZANTAC) 75 MG tablet Take 75 mg by mouth daily        tiotropium (SPIRIVA HANDIHALER) 18 MCG IN inhaled capsule Inhale contents of one capsule daily. 90 capsule 3     varenicline (CHANTIX STARTING MONTH  "CHAPITO) 0.5 MG X 11 & 1 MG X 42 tablet Take 0.5 mg tab daily for 3 days, then 0.5 mg tab twice daily for 4 days, then 1 mg twice daily. (Patient not taking: Reported on 2/20/2020) 53 tablet 0     varenicline (CHANTIX) 1 MG tablet Take 1 tablet (1 mg) by mouth 2 times daily (Patient not taking: Reported on 2/20/2020) 60 tablet 3       ALLERGIES     Allergies   Allergen Reactions     Codeine Nausea and Vomiting     vomiting     Cyclobenzaprine Nausea     Hydrocodone Nausea and Vomiting     Sulfa Drugs Nausea and Vomiting     Nausea     Gabapentin Rash       PAST MEDICAL HISTORY:  Past Medical History:   Diagnosis Date     Anxiety state, unspecified      Asthma      Chronic pain     back pain from cyst     Contact dermatitis and other eczema, due to unspecified cause      COPD (chronic obstructive pulmonary disease) (H)      Depressive disorder, not elsewhere classified      Emphysema with chronic bronchitis (H)      Esophageal reflux      Family history of ischemic heart disease      Gastro-oesophageal reflux disease      History of emphysema      Hoarseness      HTN, goal below 140/90     No cardilogist     Hyperlipidemia LDL goal <130      Liver disease     \"fatty liver\"     Other chronic pain     chest, from coughing     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)      PONV (postoperative nausea and vomiting)        PAST SURGICAL HISTORY:  Past Surgical History:   Procedure Laterality Date     ARTHROSCOPY SHOULDER DECOMPRESSION Left 10/21/2015    Procedure: ARTHROSCOPY SHOULDER DECOMPRESSION;  Surgeon: Julien Milian MD;  Location: [facility] OR     BRONCHIAL THERMOPLASTY N/A 11/14/2014    Procedure: BRONCHIAL THERMOPLASTY;  Surgeon: Ward Whitaker MD;  Location: [facility]     BRONCHIAL THERMOPLASTY N/A 12/19/2014    Procedure: BRONCHIAL THERMOPLASTY;  Surgeon: Ward Whitaker MD;  Location: [facility] OR     BRONCHIAL THERMOPLASTY N/A 2/6/2015    Procedure: BRONCHIAL THERMOPLASTY;  Surgeon: Ward Whitaker MD;  " Location: [facility] OR     C APPENDECTOMY  at age 17     COLONOSCOPY N/A 11/26/2018    Procedure: COLONOSCOPY ([facility]);  Surgeon: Marshall Oakes MD;  Location: [facility] OR     DISCECTOMY, FUSION CERVICAL ANTERIOR ONE LEVEL, COMBINED N/A 5/1/2018    Procedure: COMBINED DISCECTOMY, FUSION CERVICAL ANTERIOR ONE LEVEL;  1.  C5-C6 anterior cervical diskectomy and fusion.    2.  C5-C6 application of intervertebral biomechanical device for interbody fusion purposes.    3.  C5-C6 anterior instrumentation using the standard Kristin InViZia 24 mm plate with four associated bone screws, 12 mm in length.  Inferior screws are fixed.  Superior screws are va     EXCISE NODE MEDIASTINAL  4/26/2013    Procedure: EXCISE NODE MEDIASTINAL;;  Surgeon: Av Peña MD;  Location:  OR     LAPAROSCOPIC CHOLECYSTECTOMY N/A 10/19/2017    Procedure: LAPAROSCOPIC CHOLECYSTECTOMY;  LAPAROSCOPIC CHOLECYSTECTOMY;  Surgeon: Ney Jerry MD;  Location:  OR     THORACOSCOPY  4/26/2013    Procedure: THORACOSCOPY;  LEFT VIDEO ASSISTED THORACOSCOPY, RESECTION OF POSTERIOR MEDIASTINAL MASS;  Surgeon: Av Peña MD;  Location:  OR     TONSILLECTOMY  as a kid     TONSILLECTOMY         FAMILY HISTORY:  Family History   Problem Relation Age of Onset     Heart Disease Mother         murmur, mi     Hypertension Mother      Cerebrovascular Disease Mother      Depression Mother      Gallbladder Disease Mother      Asthma Mother      Family History Negative Father         does not know  him     Family History Negative Brother      Heart Disease Maternal Grandfather      Asthma Maternal Grandfather      Hypertension Maternal Grandfather      Cerebrovascular Disease Maternal Grandfather      Diabetes Maternal Grandfather      Asthma Sister      Hypertension Sister      Cerebrovascular Disease Sister      Hypertension Maternal Grandmother      Cerebrovascular Disease Maternal Grandmother        SOCIAL HISTORY:  Social History      Socioeconomic History     Marital status:      Spouse name: None     Number of children: None     Years of education: None     Highest education level: None   Occupational History     None   Social Needs     Financial resource strain: None     Food insecurity:     Worry: None     Inability: None     Transportation needs:     Medical: None     Non-medical: None   Tobacco Use     Smoking status: Light Tobacco Smoker     Years: 30.00     Types: Cigarettes     Last attempt to quit: 2015     Years since quittin.4     Smokeless tobacco: Never Used     Tobacco comment: 7 cigs a day   Substance and Sexual Activity     Alcohol use: No     Alcohol/week: 0.0 standard drinks     Drug use: No     Sexual activity: Yes     Partners: Male     Birth control/protection: None   Lifestyle     Physical activity:     Days per week: None     Minutes per session: None     Stress: None   Relationships     Social connections:     Talks on phone: None     Gets together: None     Attends Yazdanism service: None     Active member of club or organization: None     Attends meetings of clubs or organizations: None     Relationship status: None     Intimate partner violence:     Fear of current or ex partner: None     Emotionally abused: None     Physically abused: None     Forced sexual activity: None   Other Topics Concern      Service Not Asked     Blood Transfusions Not Asked     Caffeine Concern No     Comment: 4-5 cans of pop daily     Occupational Exposure Not Asked     Hobby Hazards Not Asked     Sleep Concern Not Asked     Stress Concern Not Asked     Weight Concern Not Asked     Special Diet No     Back Care Not Asked     Exercise No     Comment: on hold     Bike Helmet Not Asked     Seat Belt Not Asked     Self-Exams Not Asked     Parent/sibling w/ CABG, MI or angioplasty before 65F 55M? Not Asked   Social History Narrative     None       Review of Systems:  Skin:  Positive for   rash on chest   Eyes:  Positive  "for visual blurring    ENT:  Negative      Respiratory:  Positive for dyspnea on exertion;cough     Cardiovascular:    palpitations;Positive for;fatigue;lightheadedness    Gastroenterology: Positive for nausea    Genitourinary:  Positive for      Musculoskeletal:  Positive for back pain;neck pain    Neurologic:  Positive for migraine headaches rt arm numbness  Psychiatric:  Positive for anxiety    Heme/Lymph/Imm:  Positive for allergies    Endocrine:  Negative        Physical Exam:  Vitals: /79   Pulse 75   Temp 97.8  F (36.6  C)   Ht 1.6 m (5' 3\")   Wt 103.4 kg (228 lb)   LMP 04/15/2016 (Exact Date)   SpO2 96%   BMI 40.39 kg/m      Constitutional:  cooperative;in no acute distress obese      Skin:  warm and dry to the touch          Head:  normocephalic        Eyes:  pupils equal and round        Lymph:      ENT:           Neck:  no carotid bruit        Respiratory:    wheezes  diminished BS with exp wheezes RLL    Cardiac: regular rhythm;no murmurs, gallops or rubs detected                  pulses below the femoral arteries are diminished                                      GI:  abdomen soft;no bruits        Extremities and Muscular Skeletal:  no deformities, clubbing, cyanosis, erythema observed;no edema              Neurological:  no gross motor deficits;affect appropriate        Psych:  Alert and Oriented x 3          CC  Roro Chawla MD  [address] [place] [address]                  "

## 2020-02-20 NOTE — PROGRESS NOTES
Dr Low's note    Patient's instructions / PLAN:                                                      Plan:  1. Start Wellbutrin 150 mg daily and nicotine patch for smoking cessation   2. Azithromycin 250 mg, take 2 tablets today, then 1 tablet a day for the next 4 days (  antibiotic)   3. Prednisone 40 mg daily for 5 days then 20 mg daily  4.Continue the other meds, same doses for now.  5. Follow up - in 1-3 weeks - next available   6. The following vaccines are recommended for you. Please check with your insurance about coverage.  Some insurances cover better if you have these vaccines at the pharmacy:  -- Prevnar 13 ( pneumonia vaccine)  -- Shingerix vaccine - the newest vaccine for shingles     ASSESSMENT & PLAN:                                                      (Z00.00) Routine general medical examination at a health care facility  (primary encounter diagnosis)  Comment:   Plan:     (E78.5) Hyperlipidemia LDL goal <130  Comment:   Plan: Continue same meds, same doses for now     (I10) Essential hypertension with goal blood pressure less than 140/90  Comment: Controlled    Plan: Continue same meds, same doses for now     (Z12.31) Encounter for screening mammogram for breast cancer  Comment:   Plan: MA Screening Digital Bilateral            (L73.2) Hidradenitis suppurativa  Comment:   Plan: benzoyl peroxide 10 % EX external gel,         clindamycin 1 % EX external gel, doxycycline         hyclate 100 MG PO tablet            (J44.9) Chr Lung Disease - managed by the pulm dept ([facility])^^^  Comment:   Plan: budesonide-formoterol (SYMBICORT) 160-4.5         MCG/ACT IN Inhaler, tiotropium (SPIRIVA         HANDIHALER) 18 MCG IN inhaled capsule,         azithromycin 250 MG PO tablet, DISCONTINUED:         predniSONE 20 MG PO tablet            (J30.2) Seasonal allergic rhinitis, unspecified trigger  Comment:   Plan: fluticasone 50 MCG/ACT NA nasal spray            (R21) Rash and nonspecific skin eruption  Comment:    Plan: desonide 0.05 % EX external cream, nystatin         802221 UNIT/GM EX external powder            (Z71.6) Encounter for smoking cessation counseling  Comment:   Plan: nicotine 14 MG/24HR TD 24 hr patch, buPROPion         150 MG PO 24 hr tablet            (M62.81) Muscle weakness (generalized)  Comment:   Plan: Erythrocyte sedimentation rate auto, CRP         inflammation, predniSONE 20 MG PO tablet, CK         total, Vitamin D Deficiency            (E55.9) Vitamin D deficiency  Comment:   Plan: Vitamin D Deficiency            (R06.02) SOB (shortness of breath)  Comment:   Plan: as above        Chief Complaint:                                                      Annual exam  Follow up chronic medical problems      SUBJECTIVE:                                                    History of present illness     We reviewed the chronic medical problems as above.   I reviewed the recent tests results in Epic     Labs done today - results are p     COPD:   -- she tells me that [facility] would not see her again because she missed the appointment   -- she was told to call administration   -- she has a  constant cough x 2 weeks     Smoking   -- she tried Chantix again but she got the side effects  -- she would like to try the patch     Refuses pap today     Muscle weakness: arms and legs SHe used to walk 5 blocks, now she has legs pain/weakness after 1 block  She noticed jaw claudication after chewing food last night.  We will check ESR.  She does not have headache    ROS:   General: Negative for fever, chills, major weight changes, fatigue  Skin: Negative for rashes, abnormal spots  Eyes: Negative for blurred or double vision  ENT/mouth: Negative for sinuses discomfort, earache, sore throat  Respiratory: Negative for cough, wheezes, chronic lung disease  Cardiovascular: Negative for rest or exertional chest pain, shortness of breath, palpitations, leg edema,   Gastrointestinal: Negative for vomiting, abdominal  "pain, heartburn, blood in stool, diarrhea, constipation  Genitourinary: Negative for urinary frequency, blood in urine, history of kidney stones  Female: Negative for abnormal vaginal bleeding, vaginal discharge  Neuro: Negative for headaches, numbness, tingling, weakness in arms or legs, history of seizure, recent syncope  Psychiatry: Negative for depression, anxiety, suicidal thoughts  Endo: Negative for known thyroid disease, diabetes.  Hemato/Lymph: Negative for nodes, easy bleeding, history of DVT, blood transfusion  Musculoskeletal: Negative for joint swelling, back pain      PMHx: - reviewed  Past Medical History:   Diagnosis Date     Anxiety state, unspecified      Asthma      Chronic pain     back pain from cyst     Contact dermatitis and other eczema, due to unspecified cause      COPD (chronic obstructive pulmonary disease) (H)      Depressive disorder, not elsewhere classified      Emphysema with chronic bronchitis (H)      Esophageal reflux      Family history of ischemic heart disease      Gastro-oesophageal reflux disease      History of emphysema      Hoarseness      HTN, goal below 140/90     No cardilogist     Hyperlipidemia LDL goal <130      Liver disease     \"fatty liver\"     Other chronic pain     chest, from coughing     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)      PONV (postoperative nausea and vomiting)        PSHx: reviewed  Past Surgical History:   Procedure Laterality Date     ARTHROSCOPY SHOULDER DECOMPRESSION Left 10/21/2015    Procedure: ARTHROSCOPY SHOULDER DECOMPRESSION;  Surgeon: Julien Milian MD;  Location: [facility] OR     BRONCHIAL THERMOPLASTY N/A 11/14/2014    Procedure: BRONCHIAL THERMOPLASTY;  Surgeon: Ward Whitaker MD;  Location: [facility]     BRONCHIAL THERMOPLASTY N/A 12/19/2014    Procedure: BRONCHIAL THERMOPLASTY;  Surgeon: Ward Whitaker MD;  [facility]: [facility]     BRONCHIAL THERMOPLASTY N/A 2/6/2015    Procedure: BRONCHIAL THERMOPLASTY;  Surgeon: Sherman, " Ward Mabry MD;  Location: [facility]     C APPENDECTOMY  at age 17     COLONOSCOPY N/A 2018    Procedure: COLONOSCOPY ([facility]);  Surgeon: Marshall Oakes MD;  Location: [facility] OR     DISCECTOMY, FUSION CERVICAL ANTERIOR ONE LEVEL, COMBINED N/A 2018    Procedure: COMBINED DISCECTOMY, FUSION CERVICAL ANTERIOR ONE LEVEL;  1.  C5-C6 anterior cervical diskectomy and fusion.    2.  C5-C6 application of intervertebral biomechanical device for interbody fusion purposes.    3.  C5-C6 anterior instrumentation using the standard Kristin InViZia 24 mm plate with four associated bone screws, 12 mm in length.  Inferior screws are fixed.  Superior screws are va     EXCISE NODE MEDIASTINAL  2013    Procedure: EXCISE NODE MEDIASTINAL;;  Surgeon: Av Peña MD;  Location:  OR     LAPAROSCOPIC CHOLECYSTECTOMY N/A 10/19/2017    Procedure: LAPAROSCOPIC CHOLECYSTECTOMY;  LAPAROSCOPIC CHOLECYSTECTOMY;  Surgeon: Ney Jerry MD;  Location:  OR     THORACOSCOPY  2013    Procedure: THORACOSCOPY;  LEFT VIDEO ASSISTED THORACOSCOPY, RESECTION OF POSTERIOR MEDIASTINAL MASS;  Surgeon: Av Peña MD;  Location:  OR     TONSILLECTOMY  as a kid     TONSILLECTOMY          Soc Hx: No daily alcohol, no smoking  Social History     Socioeconomic History     Marital status:      Spouse name: Not on file     Number of children: Not on file     Years of education: Not on file     Highest education level: Not on file   Occupational History     Not on file   Social Needs     Financial resource strain: Not on file     Food insecurity:     Worry: Not on file     Inability: Not on file     Transportation needs:     Medical: Not on file     Non-medical: Not on file   Tobacco Use     Smoking status: Light Tobacco Smoker     Years: 30.00     Types: Cigarettes     Last attempt to quit: 2015     Years since quittin.4     Smokeless tobacco: Never Used     Tobacco comment: off/on 5 cigs per day    Substance and Sexual Activity     Alcohol use: No     Alcohol/week: 0.0 standard drinks     Drug use: No     Sexual activity: Yes     Partners: Male     Birth control/protection: None   Lifestyle     Physical activity:     Days per week: Not on file     Minutes per session: Not on file     Stress: Not on file   Relationships     Social connections:     Talks on phone: Not on file     Gets together: Not on file     Attends Uatsdin service: Not on file     Active member of club or organization: Not on file     Attends meetings of clubs or organizations: Not on file     Relationship status: Not on file     Intimate partner violence:     Fear of current or ex partner: Not on file     Emotionally abused: Not on file     Physically abused: Not on file     Forced sexual activity: Not on file   Other Topics Concern      Service Not Asked     Blood Transfusions Not Asked     Caffeine Concern No     Comment: 4-5 cans of pop daily     Occupational Exposure Not Asked     Hobby Hazards Not Asked     Sleep Concern Not Asked     Stress Concern Not Asked     Weight Concern Not Asked     Special Diet No     Back Care Not Asked     Exercise No     Comment: on hold     Bike Helmet Not Asked     Seat Belt Not Asked     Self-Exams Not Asked     Parent/sibling w/ CABG, MI or angioplasty before 65F 55M? Not Asked   Social History Narrative     Not on file        Fam Hx: reviewed  Family History   Problem Relation Age of Onset     Heart Disease Mother         murmur, mi     Hypertension Mother      Cerebrovascular Disease Mother      Depression Mother      Gallbladder Disease Mother      Asthma Mother      Family History Negative Father         does not know  him     Family History Negative Brother      Heart Disease Maternal Grandfather      Asthma Maternal Grandfather      Hypertension Maternal Grandfather      Cerebrovascular Disease Maternal Grandfather      Diabetes Maternal Grandfather      Asthma Sister      Hypertension  Sister      Cerebrovascular Disease Sister      Hypertension Maternal Grandmother      Cerebrovascular Disease Maternal Grandmother          Screening: reviewed      All: reviewed    Meds: reviewed  Current Outpatient Medications   Medication Sig Dispense Refill     albuterol (PROAIR HFA/PROVENTIL HFA/VENTOLIN HFA) 108 (90 Base) MCG/ACT inhaler Inhale 2 puffs into the lungs every 6 hours as needed for shortness of breath / dyspnea 18 g 3     albuterol (PROVENTIL) (2.5 MG/3ML) 0.083% neb solution Take 1 vial (2.5 mg) by nebulization every 6 hours as needed for shortness of breath / dyspnea or wheezing 25 vial 3     amLODIPine (NORVASC) 10 MG tablet Take 1 tablet (10 mg) by mouth daily 90 tablet 1     atorvastatin (LIPITOR) 80 MG tablet Take 1 tablet (80 mg) by mouth daily 90 tablet 1     benzoyl peroxide (ACNE-CLEAR) 10 % external gel Apply topically 2 times daily 90 g 3     budesonide-formoterol (SYMBICORT) 160-4.5 MCG/ACT Inhaler Inhale 2 puffs into the lungs 2 times daily 1 Inhaler 0     cetirizine HCl 10 MG CAPS Take 1 capsule (10 mg) by mouth daily as needed Takes in the spring. 90 capsule 3     clindamycin (CLINDAMAX) 1 % external gel Apply topically 2 times daily 60 g 3     D3-50 99847 units capsule TAKE ONE CAPSULE BY MOUTH EVERY 7 DAYS  (AFTER THIS PRESCRIPTION, PATIENT TO CONTINUE WITH VITAMIN D3 2,000 UNITS) OVER THE COUNTER 13 capsule 0     desonide (DESOWEN) 0.05 % cream Apply topically as needed        doxycycline hyclate (VIBRA-TABS) 100 MG tablet Take 1 tablet (100 mg) by mouth 2 times daily 90 tablet 0     fluticasone (FLONASE) 50 MCG/ACT nasal spray Spray 2 sprays in nostril daily 16 g 5     furosemide (LASIX) 20 MG tablet Take 1 tablet (20 mg) by mouth daily 90 tablet 1     hydrOXYzine (ATARAX) 50 MG tablet TAKE ONE TO TWO TABLETS BY MOUTH FOUR TIMES A DAY AS NEEDED FOR  ANXIETY 180 tablet 0     IBUPROFEN PO Take 600 mg by mouth every 6 hours as needed for moderate pain       lisinopril  "(PRINIVIL/ZESTRIL) 20 MG tablet Take 1 tablet (20 mg) by mouth daily 90 tablet 1     montelukast (SINGULAIR) 10 MG tablet TAKE ONE TABLET BY MOUTH AT BEDTIME 90 tablet 0     nicotine (EQ NICOTINE POLACRILEX) 2 MG lozenge Place 1 lozenge (2 mg) inside cheek every hour as needed for smoking cessation 360 lozenge 1     nicotine polacrilex (RA NICOTINE GUM) 2 MG gum Place 2 mg inside cheek as needed for smoking cessation       nitroGLYcerin (NITROSTAT) 0.4 MG sublingual tablet FOR CHEST PAIN PLACE ONE TABLET UNDER THE TONGUE EVERY 5 MINUTES FOR 3 DOSES.  IF SYPTOMS PERSIST 5 MINUTES AFTER 1ST DOSE CALL 911 25 tablet 0     omeprazole 20 MG tablet Take 1 tablet (20 mg) by mouth 2 times daily 180 tablet 1     order for DME Equipment being ordered: Digital home blood pressure monitor kit 1 Device 0     order for DME Equipment being ordered: Pulse Oxymeter 1 Device 0     ranitidine (ZANTAC) 75 MG tablet Take 75 mg by mouth daily        tiotropium (SPIRIVA HANDIHALER) 18 MCG capsule Inhale contents of one capsule daily. 90 capsule 3     varenicline (CHANTIX STARTING MONTH PAK) 0.5 MG X 11 & 1 MG X 42 tablet Take 0.5 mg tab daily for 3 days, then 0.5 mg tab twice daily for 4 days, then 1 mg twice daily. (Patient not taking: Reported on 2/20/2020) 53 tablet 0     varenicline (CHANTIX) 1 MG tablet Take 1 tablet (1 mg) by mouth 2 times daily (Patient not taking: Reported on 2/20/2020) 60 tablet 3       OBJECTIVE:                                                    Physical Exam :  Blood pressure 121/83, pulse 95, temperature 98.3  F (36.8  C), resp. rate 19, height 1.6 m (5' 3\"), weight 102.5 kg (226 lb), last menstrual period 04/15/2016, SpO2 95 %, not currently breastfeeding.     NAD, appears comfortable  Skin signs of hidradenitis suppurativa on the mid abdomen  HEENT: PERRLA, EOMI, anicteric sclera, pink conjunctiva, external ears appear normal, bilateral tympanic membranes clinically normal, oropharynx normal color.  Neck: " "supple, no JVD,  no thyroidmegaly  Lymph nodes non palpable in the cervical, supraclavicular axillaries,  Chest: clear to auscultation with good respiratory effort  Cardiac: S1S2, RRR, no mgr appreciated  Abdomen: soft, not tender, not distended, audible bowel sound, no hepatosplenomegaly, no palpable masses, no abdominal bruits  Extremities: no cyanosis, clubbing or edema.   Neuro: A, Ox3, no focal signs.  Breast exam in supine and erect position: they are symmetrical, no skin changes, no tenderness or nodes on palpation. Nipples are erect, no skin lesions, no discharge on pressure.    Pelvic exam: Refused        Roro Low MD  Internal Medicine       SUBJECTIVE:   Swetha Patterson is a 49 year old female who presents for Preventive Visit.      Are you in the first 12 months of your Medicare coverage?  No    Healthy Habits:     In general, how would you rate your overall health?  Poor    Frequency of exercise:  2-3 days/week    Duration of exercise:  Less than 15 minutes    Do you usually eat at least 4 servings of fruit and vegetables a day, include whole grains    & fiber and avoid regularly eating high fat or \"junk\" foods?  No    Taking medications regularly:  No    Barriers to taking medications:  Problems remembering to take them    Medication side effects:  Other    Ability to successfully perform activities of daily living:  Housework requires assistance and laundry requires assistance    Home Safety:  No safety concerns identified    Hearing Impairment:  Difficulty following a conversation in a noisy restaurant or crowded room and difficult to understand a speaker at a public meeting or Taoism service    In the past 6 months, have you been bothered by leaking of urine? Yes    In general, how would you rate your overall mental or emotional health?  Fair      PHQ-2 Total Score: 3    Additional concerns today:  No    Do you feel safe in your environment? Yes    Have you ever done Advance Care Planning? " (For example, a Health Directive, POLST, or a discussion with a medical provider or your loved ones about your wishes): No, advance care planning information given to patient to review.  Advanced care planning was discussed at today's visit.      Fall risk       Cognitive Screening   Not needed at age 49    Mini-CogTM Copyright YESSI Green. Licensed by the author for use in Health system; reprinted with permission (vladislav@[facility]). All rights reserved.      Do you have sleep apnea, excessive snoring or daytime drowsiness?: no    Reviewed and updated as needed this visit by clinical staff  Tobacco  Allergies         Reviewed and updated as needed this visit by Provider        Social History     Tobacco Use     Smoking status: Light Tobacco Smoker     Years: 30.00     Types: Cigarettes     Last attempt to quit: 2015     Years since quittin.4     Smokeless tobacco: Never Used     Tobacco comment: off/on 5 cigs per day   Substance Use Topics     Alcohol use: No     Alcohol/week: 0.0 standard drinks         Alcohol Use 2020   Prescreen: >3 drinks/day or >7 drinks/week? Not Applicable   Prescreen: >3 drinks/day or >7 drinks/week? -           Hyperlipidemia Follow-Up      Are you regularly taking any medication or supplement to lower your cholesterol?   Yes- lipitor    Are you having muscle aches or other side effects that you think could be caused by your cholesterol lowering medication?  No    Hypertension Follow-up      Do you check your blood pressure regularly outside of the clinic? Yes     Are you following a low salt diet? Yes    Are your blood pressures ever more than 140 on the top number (systolic) OR more   than 90 on the bottom number (diastolic), for example 140/90? Yes      Current providers sharing in care for this patient include:   Patient Care Team:  Roro Chawla MD as PCP - General (Internal Medicine)  Roro Chawla MD as Assigned PCP  Dipti Mayen  "MD CHICA as MD (Otolaryngology)  Lis Talbert MD as MD (Otolaryngology)  Bhavani Tanner MD as MD (Urology)  Eduarda Pandya, RN as Registered Nurse (Urology)  [facility]  Outside, Provider as     The following health maintenance items are reviewed in Epic and correct as of today:  Health Maintenance   Topic Date Due     COPD ACTION PLAN  1970     MAMMO SCREENING  06/18/2017     URINE DRUG SCREEN  04/04/2018     HPV TEST  03/11/2019     PAP  03/11/2019     MEDICARE ANNUAL WELLNESS VISIT  04/26/2019     INFLUENZA VACCINE (1) 09/01/2019     PHQ-9  11/02/2019     PNEUMOCOCCAL IMMUNIZATION 19-64 HIGHEST RISK (2 of 3 - PCV13) 11/21/2019     ZOSTER IMMUNIZATION (1 of 2) 04/30/2019     GIDEON ASSESSMENT  05/02/2020     DTAP/TDAP/TD IMMUNIZATION (2 - Td) 06/12/2022     COLONOSCOPY  11/26/2028     SPIROMETRY  Completed     HIV SCREENING  Completed     DEPRESSION ACTION PLAN  Completed     IPV IMMUNIZATION  Aged Out     MENINGITIS IMMUNIZATION  Aged Out     Labs reviewed in EPIC      Review of Systems   HENT: Negative for congestion.    Respiratory: Negative for cough.    Gastrointestinal: Negative for heartburn.   Musculoskeletal: Negative for arthralgias and myalgias.   Neurological: Negative for dizziness.   Psychiatric/Behavioral: Negative for mood changes. The patient is not nervous/anxious.          COUNSELING:  Reviewed preventive health counseling, as reflected in patient instructions       Regular exercise       Healthy diet/nutrition    Estimated body mass index is 40.03 kg/m  as calculated from the following:    Height as of this encounter: 1.6 m (5' 3\").    Weight as of this encounter: 102.5 kg (226 lb).    Weight management plan: Discussed healthy diet and exercise guidelines     reports that she has been smoking cigarettes. She has smoked for the past 30.00 years. She has never used smokeless tobacco.  Tobacco Cessation Action Plan: " Pharmacotherapies : Nicotine patch    Appropriate preventive services were discussed with this patient, including applicable screening as appropriate for cardiovascular disease, diabetes, osteopenia/osteoporosis, and glaucoma.  As appropriate for age/gender, discussed screening for colorectal cancer, prostate cancer, breast cancer, and cervical cancer. Checklist reviewing preventive services available has been given to the patient.    Reviewed patients plan of care and provided an AVS. The Basic Care Plan (routine screening as documented in Health Maintenance) for Swetha meets the Care Plan requirement. This Care Plan has been established and reviewed with the Patient.    Counseling Resources:  ATP IV Guidelines  Pooled Cohorts Equation Calculator  Breast Cancer Risk Calculator  FRAX Risk Assessment  ICSI Preventive Guidelines  Dietary Guidelines for Americans, 2010  [employer]'s MyPlate  ASA Prophylaxis  Lung CA Screening    Roro Chawla MD  [facility]    Identified Health Risks:  Answers for HPI/ROS submitted by the patient on 2/20/2020   Annual Exam:  If you checked off any problems, how difficult have these problems made it for you to do your work, take care of things at home, or get along with other people?: Very difficult  PHQ9 TOTAL SCORE: 17

## 2020-02-20 NOTE — PATIENT INSTRUCTIONS
Plan:  1. Start Wellbutrin 150 mg daily and nicotine patch for smoking cessation   2. Azithromycin 250 mg, take 2 tablets today, then 1 tablet a day for the next 4 days (  antibiotic)   3. Prednisone 40 mg daily for 5 days then 20 mg daily  4.Continue the other meds, same doses for now.  5. Follow up - in 1-3 weeks - next available   6. The following vaccines are recommended for you. Please check with your insurance about coverage.  Some insurances cover better if you have these vaccines at the pharmacy:  -- Prevnar 13 ( pneumonia vaccine)  -- Shingerix vaccine - the newest vaccine for shingles

## 2020-02-20 NOTE — TELEPHONE ENCOUNTER
Patient called asking to be seen. She has been having SOB and her legs are so fatigued when she walks just from her bathroom to her living room or living room to bedroom she can barely make it. She says she does not sleep well but that is not new. She does have lung problems. She said the extreme fatigue and heaviness in her legs is getting worse. She has a family history of cardiac disease and feels there is something very wrong with her right now. She denies pain. I reviewed the note from her PMD today-labs were done and she was started on Prednisone but she says that is for her breathing. I spoke to Pinky in Scheduling-she will call the patient. There is an opening today at [facility] with Dr. Tate.

## 2020-02-21 ENCOUNTER — TELEPHONE (OUTPATIENT)
Dept: INTERNAL MEDICINE | Facility: CLINIC | Age: 50
End: 2020-02-21

## 2020-02-21 DIAGNOSIS — F17.219 CIGARETTE NICOTINE DEPENDENCE WITH NICOTINE-INDUCED DISORDER: Primary | ICD-10-CM

## 2020-02-21 LAB
ALBUMIN SERPL-MCNC: 3.5 G/DL (ref 3.4–5)
ALP SERPL-CCNC: 181 U/L (ref 40–150)
ALT SERPL W P-5'-P-CCNC: 42 U/L (ref 0–50)
ANION GAP SERPL CALCULATED.3IONS-SCNC: 7 MMOL/L (ref 3–14)
AST SERPL W P-5'-P-CCNC: 19 U/L (ref 0–45)
BILIRUB SERPL-MCNC: 0.3 MG/DL (ref 0.2–1.3)
BUN SERPL-MCNC: 21 MG/DL (ref 7–30)
CALCIUM SERPL-MCNC: 8.8 MG/DL (ref 8.5–10.1)
CHLORIDE SERPL-SCNC: 110 MMOL/L (ref 94–109)
CHOLEST SERPL-MCNC: 167 MG/DL
CK SERPL-CCNC: 65 U/L (ref 30–225)
CO2 SERPL-SCNC: 24 MMOL/L (ref 20–32)
CREAT SERPL-MCNC: 0.71 MG/DL (ref 0.52–1.04)
CREAT UR-MCNC: 286 MG/DL
DEPRECATED CALCIDIOL+CALCIFEROL SERPL-MC: 52 UG/L (ref 20–75)
GFR SERPL CREATININE-BSD FRML MDRD: >90 ML/MIN/{1.73_M2}
GLUCOSE SERPL-MCNC: 102 MG/DL (ref 70–99)
HDLC SERPL-MCNC: 35 MG/DL
LDLC SERPL CALC-MCNC: 96 MG/DL
MICROALBUMIN UR-MCNC: 18 MG/L
MICROALBUMIN/CREAT UR: 6.43 MG/G CR (ref 0–25)
NONHDLC SERPL-MCNC: 132 MG/DL
POTASSIUM SERPL-SCNC: 4.3 MMOL/L (ref 3.4–5.3)
PROT SERPL-MCNC: 7.6 G/DL (ref 6.8–8.8)
SODIUM SERPL-SCNC: 141 MMOL/L (ref 133–144)
TRIGL SERPL-MCNC: 180 MG/DL
TSH SERPL DL<=0.005 MIU/L-ACNC: 1.78 MU/L (ref 0.4–4)

## 2020-02-21 NOTE — TELEPHONE ENCOUNTER
Pt requesting a RX for 2mg nicotine gum be sent to [facility] pharmacy. She also states Dr. Tate scheduled an ultrasound for her legs do to her pain. Dr. Tate suggested for her to stop taking her Lipitor & BP meds to see if it helps her legs until the ultrasound gets completed. She would like to know if Dr. Low agrees & if she should try a different BP med. Please advise pt, OK to LM

## 2020-02-22 NOTE — TELEPHONE ENCOUNTER
Nicotine gum Rx done  Cardiologist, Dr. Tate, recommended to stop only the Lipitor and not the  blood pressure medications and I agree with her

## 2020-02-24 ENCOUNTER — TELEPHONE (OUTPATIENT)
Dept: CARE COORDINATION | Facility: CLINIC | Age: 50
End: 2020-02-24

## 2020-02-24 DIAGNOSIS — B37.31 YEAST INFECTION OF THE VAGINA: Primary | ICD-10-CM

## 2020-02-24 RX ORDER — FLUCONAZOLE 150 MG/1
TABLET ORAL
Qty: 2 TABLET | Refills: 0 | Status: SHIPPED | OUTPATIENT
Start: 2020-02-24 | End: 2020-06-03

## 2020-02-24 NOTE — TELEPHONE ENCOUNTER
"Patient and this writer have a number of questions about medications.     This writer set up meds today in home. This writer set up chantix ramp up schedule starting today. Patient wondering if she can smoke some cigarettes while starting chantix because she stated she \"cannot quit cold turkey\". Patient also wondering if she can use nicotine patch and smoke some cigarettes. This writer stated that it might not be safe to smoke and use nicotine patches at the same time while taking chantix but to do one or the other. Please, advise if this was incorrect. Patient also has lozenges and nicotine gum. Is patient able to use all 4 of these smoking cessation therapies concurrently?    Patient stated that she is getting a vaginal yeast infection now that she is taking doxycycline as ordered. She was wondering if MD would consider prescribing diflucan because this has helped in the past.    Lastly, patient has ranitidine on med list but no rx in home. Is patient supposed to take this and omeprazole BID?    Thank you  "

## 2020-02-24 NOTE — TELEPHONE ENCOUNTER
1 smoking.  If she feels she needs a cigarette, she should use a lozenge.  Not advisable to smoke and use the nicotine    2.  Fluconazole Rx done    3.  Discontinue ranitidine.  Continue omeprazole twice daily    Thank you,    Roro Low MD  Internal Medicine

## 2020-02-27 ENCOUNTER — TELEPHONE (OUTPATIENT)
Dept: INTERNAL MEDICINE | Facility: CLINIC | Age: 50
End: 2020-02-27

## 2020-02-27 NOTE — TELEPHONE ENCOUNTER
Azithromycin/bupropion/ nicotine/nystatin/prednisone order received via fax. Form in your mailbox to be signed.

## 2020-02-28 ENCOUNTER — TELEPHONE (OUTPATIENT)
Dept: INTERNAL MEDICINE | Facility: CLINIC | Age: 50
End: 2020-02-28

## 2020-02-28 ENCOUNTER — HOSPITAL ENCOUNTER (OUTPATIENT)
Dept: CARDIOLOGY | Facility: CLINIC | Age: 50
Discharge: HOME | End: 2020-02-28
Attending: INTERNAL MEDICINE | Admitting: INTERNAL MEDICINE
Payer: COMMERCIAL

## 2020-02-28 DIAGNOSIS — R09.89 OTHER SPECIFIED SYMPTOMS AND SIGNS INVOLVING THE CIRCULATORY AND RESPIRATORY SYSTEMS: ICD-10-CM

## 2020-02-28 DIAGNOSIS — R00.2 PALPITATIONS: ICD-10-CM

## 2020-02-28 DIAGNOSIS — R29.898 LEG FATIGUE: ICD-10-CM

## 2020-02-28 DIAGNOSIS — Z72.0 TOBACCO ABUSE: ICD-10-CM

## 2020-02-28 DIAGNOSIS — R06.02 SOB (SHORTNESS OF BREATH): ICD-10-CM

## 2020-02-28 DIAGNOSIS — M79.10 MYALGIA: ICD-10-CM

## 2020-02-28 PROCEDURE — 93924 LWR XTR VASC STDY BILAT: CPT | Mod: 26 | Performed by: INTERNAL MEDICINE

## 2020-02-28 PROCEDURE — 93924 LWR XTR VASC STDY BILAT: CPT

## 2020-03-02 DIAGNOSIS — Z53.9 DIAGNOSIS NOT YET DEFINED: Primary | ICD-10-CM

## 2020-03-02 PROCEDURE — G0180 MD CERTIFICATION HHA PATIENT: HCPCS | Performed by: INTERNAL MEDICINE

## 2020-03-03 ENCOUNTER — TELEPHONE (OUTPATIENT)
Dept: INTERNAL MEDICINE | Facility: CLINIC | Age: 50
End: 2020-03-03

## 2020-03-03 DIAGNOSIS — R19.7 DIARRHEA, UNSPECIFIED TYPE: Primary | ICD-10-CM

## 2020-03-03 NOTE — TELEPHONE ENCOUNTER
[facility] nurse calling with multiple questions pt has. Pt would like to know if Prednisone is a lifetime medication for her. She also would like a referral to a Pulmonologist. Nurse reports that pt has had loose stools for 5 days and is wondering if pt should be tested for C-diff. Nurse can be reached at [phone]. Please advise. Thanks.

## 2020-03-03 NOTE — TELEPHONE ENCOUNTER
Left message for Mary Lou RN with [facility] to return call to triage nurse.  SERJIO Chandra R.N.

## 2020-03-04 NOTE — TELEPHONE ENCOUNTER
Spoke with Mary Lou EARLY with [facility].    1.  Patient is Having 5-6 loose stools per day x5-6 days.  Denies fever or abdominal cramping. No signs of dehydration.  Is on continuous Doxycycline for a skin condition, also just finished a Zpak.  Home care nurse asking if MD wants to order C. Diff testing.  Patient has no history of C. Diff.  2.  Patient wants to know if Prednisone is going to be long term med for her.    3.  Had seen [facility] pulmonary, will call and see if they can get her in.  If she has problems scheduling she will call us back.  SERJIO Chandra R.N.

## 2020-03-06 ENCOUNTER — PREP FOR PROCEDURE (OUTPATIENT)
Dept: SURGERY | Facility: CLINIC | Age: 50
End: 2020-03-06

## 2020-03-06 ENCOUNTER — TELEPHONE (OUTPATIENT)
Dept: SURGERY | Facility: CLINIC | Age: 50
End: 2020-03-06

## 2020-03-06 ENCOUNTER — OFFICE VISIT (OUTPATIENT)
Dept: INTERNAL MEDICINE | Facility: CLINIC | Age: 50
End: 2020-03-06
Payer: COMMERCIAL

## 2020-03-06 VITALS
DIASTOLIC BLOOD PRESSURE: 67 MMHG | SYSTOLIC BLOOD PRESSURE: 134 MMHG | BODY MASS INDEX: 39.68 KG/M2 | RESPIRATION RATE: 18 BRPM | TEMPERATURE: 98.1 F | WEIGHT: 224 LBS | OXYGEN SATURATION: 98 % | HEART RATE: 75 BPM

## 2020-03-06 DIAGNOSIS — I73.9 CLAUDICATION OF BOTH LOWER EXTREMITIES (H): ICD-10-CM

## 2020-03-06 DIAGNOSIS — I73.9 PAD (PERIPHERAL ARTERY DISEASE) (H): ICD-10-CM

## 2020-03-06 DIAGNOSIS — F41.9 ANXIETY: Chronic | ICD-10-CM

## 2020-03-06 DIAGNOSIS — F17.200 SMOKER: ICD-10-CM

## 2020-03-06 DIAGNOSIS — M26.69 JAW CLAUDICATION: Primary | ICD-10-CM

## 2020-03-06 DIAGNOSIS — J44.9 COPD, MODERATE (H): Primary | ICD-10-CM

## 2020-03-06 DIAGNOSIS — M26.69 JAW CLAUDICATION: ICD-10-CM

## 2020-03-06 DIAGNOSIS — R73.03 PREDIABETES: ICD-10-CM

## 2020-03-06 PROCEDURE — 99215 OFFICE O/P EST HI 40 MIN: CPT | Performed by: INTERNAL MEDICINE

## 2020-03-06 ASSESSMENT — PATIENT HEALTH QUESTIONNAIRE - PHQ9: SUM OF ALL RESPONSES TO PHQ QUESTIONS 1-9: 16

## 2020-03-06 NOTE — TELEPHONE ENCOUNTER
Type of surgery: LEFT TEMPORAL ARTERY BIOPSY   Location of surgery: [facility]  Date and time of surgery: 3/11/2020 @ 12:20 PM   Surgeon: Ibeth Conner MD   Pre-Op Appt Date: 3/9/2020 DR PASTOR   Post-Op Appt Date: PATIENT TO SCHEDULE     Packet sent out: PATIENT TO  3/9/2020   Pre-cert/Authorization completed:  Not Applicable  Date: 3/6/2020      LEFT TEMPORAL ARTERY BIOPSY    GENERAL H&P 3/9/2020 DR PASTOR 60 MIN REQ PA IF AVAIL MGB NMS

## 2020-03-06 NOTE — PATIENT INSTRUCTIONS
Plan:  1. Prednisone   -- take 1/2 tablet daily for a week,   -- then take 1/4 tablet for a week then stop it. ( if you can't split it in 1/4 take 1/2 tab every other day for 7 - 10 days then stop it  2. Resume Lipitor 80 mg   3. Low carbohydrate diet  4. We will consider adding Metformin after you are done with the work up for the leg vessels   5. Stool tests - today   6. Follow up on March 19

## 2020-03-06 NOTE — PROGRESS NOTES
Dr Low's note    Patient's instructions / PLAN:                                                        Plan:  1. Prednisone:  -- take 1/2 tablet daily for a week,   -- then take 1/4 tablet for a week then stop it. ( if you can't split it in 1/4 take 1/2 tab every other day for 7 - 10 days then stop it  2. Resume Lipitor 80 mg   3. Low carbohydrate diet  4. We will consider adding Metformin after you are done with the work up for the leg vessels   5. Stool tests - today   6. Follow up on March 19    ASSESSMENT & PLAN:                                                      49 y old woman with complicated medical problems complains of jaw pain with eating, legs pain with walking (SILVIA results abnormal ), arm pain if she keeps them up. No vision changes, normal ESR, not improving with Prednisone 20 mg daily ( prescribed for COPD exacerbation). I am concerned for giant cell arteritis. She agrees for temporal artery biopsy. I talked to the surgeon office and scheduled her for March 11. We will schedule her for Pre-Op on March 9.       (J44.9) COPD, moderate (H)  (primary encounter diagnosis)  Comment: feeling better   Plan: PULMONARY MEDICINE REFERRAL          (F41.9) Anxiety  Comment: stable   Plan: Continue same meds, same doses for now     (I73.9) PAD (peripheral artery disease) (H)  Comment: Not controlled ? Giant cell arteritis   Plan: f/u w cardio     (I73.9) Claudication of both lower extremities (H)  Comment: Giant cell arteritis   Plan: biopsy     (F17.200) Smoker  Comment: see below  Plan: Continue same meds, same doses for now     (R73.03) Prediabetes  Comment:   Plan: as above      Chief complaint:                                                      Fatigue   Legs jaw pain fatigue      SUBJECTIVE:                                                    History of present illness:    Legs claudication, PAD  -- recent SILVIA discussed. She says she was only able to stay on treadmill for 90 seconds  -- f/u with cardio  dr Tate  -- She feels it all the time, walking short distances makes her tired, Says her BP was 177/110 when she walked 12 steps    Jaw claudication   -- x1 month  -- Prednisone didn't make a difference   -- ESR 16  -- still giant cell arteritis is in diff dx.     COPD  -- prednisone 20mg  -- she still feels tightness in the chest   -- doxycycline 100mg    Smoker  -- Wellbutrin 150mg daily   -- Chantix once daily  -- nicotine patches  -- she is down to 1.5 cigarettes a day    DM  -- 6.0 <-- 5.6  -- She is prediabetic, we will not prescribe metformin until she finishes studies for the legs        US SILVIA Doppler Impression:      Right le. Resting SILVIA is 1.21, which is normal  2. Exercise study: Post exercise SILVIA of 0.89, which is an abnormal  drop (>0.20) and reflects presence of PAD. Significant leg fatigue  with exercise prompting early termination of study.     Left leg:   Resting SILVIA is 1.23, which is normal  2. Exercise study: Post exercise SILVIA of 0.96, which is an abnormal  drop (>0.20) and reflects probable presence of PAD. Significant leg  fatigue with exercise prompting early termination of study.      Hyperlipidemia Follow-Up      Are you regularly taking any medication or supplement to lower your cholesterol?   Yes- lipitor    Are you having muscle aches or other side effects that you think could be caused by your cholesterol lowering medication?  No    Hypertension Follow-up      Do you check your blood pressure regularly outside of the clinic? Yes     Are you following a low salt diet? Yes    Are your blood pressures ever more than 140 on the top number (systolic) OR more   than 90 on the bottom number (diastolic), for example 140/90? Yes      How many servings of fruits and vegetables do you eat daily?  2-3    On average, how many sweetened beverages do you drink each day (Examples: soda, juice, sweet tea, etc.  Do NOT count diet or artificially sweetened beverages)?   1    How many days per week  do you exercise enough to make your heart beat faster? 3 or less    How many minutes a day do you exercise enough to make your heart beat faster? 9 or less    How many days per week do you miss taking your medication? 0      Review of Systems:                                                      ROS: negative for fever, chills,  wheezes, chest pain,  vomiting, abdominal pain, leg swelling, positive for fatigue, leg pain, chr cough  and chr SOB    This document serves as a record of the services and decisions personally performed and made by Dr. Maranda MD. It was created on their behalf by Jimmy Somers, a trained medical scribe. The creation of this document is based on the provider's statements to the medical scribe.  Jimmy Somers March 6, 2020 11:16 AM      OBJECTIVE:             Physical exam:  Blood pressure 134/67, pulse 75, temperature 98.1  F (36.7  C), resp. rate 18, weight 101.6 kg (224 lb), last menstrual period 04/15/2016, SpO2 98 %, not currently breastfeeding.   NAD, appears comfortable  Skin: no rashes   Neck: supple, no JVD,  No thyroidmegaly. Lymph nodes nonpalpable cervical and supraclavicular.  Chest: clear to auscultation bilaterally, good respiratory effort  Heart: S1 S2, RRR, no mgr appreciated  Abdomen: soft, not tender,   Extremities: no edema,  Neurologic: A, Ox3, no focal signs appreciated    PMHx: reviewed  Past Medical History:   Diagnosis Date     Anxiety state, unspecified      Asthma      Chronic pain     back pain from cyst     Contact dermatitis and other eczema, due to unspecified cause      COPD (chronic obstructive pulmonary disease) (H)      Depressive disorder, not elsewhere classified      Emphysema with chronic bronchitis (H)      Esophageal reflux      Family history of ischemic heart disease      Gastro-oesophageal reflux disease      History of emphysema      Hoarseness      HTN, goal below 140/90     No cardilogist     Hyperlipidemia LDL goal <130      Liver disease      "\"fatty liver\"     Other chronic pain     chest, from coughing     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)      PONV (postoperative nausea and vomiting)       PSHx: reviewed  Past Surgical History:   Procedure Laterality Date     ARTHROSCOPY SHOULDER DECOMPRESSION Left 10/21/2015    Procedure: ARTHROSCOPY SHOULDER DECOMPRESSION;  Surgeon: Julien Milian MD;  Location: [facility] OR     BRONCHIAL THERMOPLASTY N/A 11/14/2014    Procedure: BRONCHIAL THERMOPLASTY;  Surgeon: Ward Whitaker MD;  Location: [facility]     BRONCHIAL THERMOPLASTY N/A 12/19/2014    Procedure: BRONCHIAL THERMOPLASTY;  Surgeon: Ward Whitaker MD;  Location: [facility]     BRONCHIAL THERMOPLASTY N/A 2/6/2015    Procedure: BRONCHIAL THERMOPLASTY;  Surgeon: Ward Whitaker MD;  Location: [facility]     C APPENDECTOMY  at age 17     COLONOSCOPY N/A 11/26/2018    Procedure: COLONOSCOPY ([facility]);  Surgeon: Marshall Oakes MD;  Location: [facility] OR     DISCECTOMY, FUSION CERVICAL ANTERIOR ONE LEVEL, COMBINED N/A 5/1/2018    Procedure: COMBINED DISCECTOMY, FUSION CERVICAL ANTERIOR ONE LEVEL;  1.  C5-C6 anterior cervical diskectomy and fusion.    2.  C5-C6 application of intervertebral biomechanical device for interbody fusion purposes.    3.  C5-C6 anterior instrumentation using the standard Kristin InViZia 24 mm plate with four associated bone screws, 12 mm in length.  Inferior screws are fixed.  Superior screws are va     EXCISE NODE MEDIASTINAL  4/26/2013    Procedure: EXCISE NODE MEDIASTINAL;;  Surgeon: Av Peña MD;  Location:  OR     LAPAROSCOPIC CHOLECYSTECTOMY N/A 10/19/2017    Procedure: LAPAROSCOPIC CHOLECYSTECTOMY;  LAPAROSCOPIC CHOLECYSTECTOMY;  Surgeon: Ney Jerry MD;  Location:  OR     THORACOSCOPY  4/26/2013    Procedure: THORACOSCOPY;  LEFT VIDEO ASSISTED THORACOSCOPY, RESECTION OF POSTERIOR MEDIASTINAL MASS;  Surgeon: Av Peña MD;  Location:  OR     TONSILLECTOMY  as a kid     TONSILLECTOMY "          Meds: reviewed  Current Outpatient Medications   Medication Sig Dispense Refill     albuterol (PROAIR HFA/PROVENTIL HFA/VENTOLIN HFA) 108 (90 Base) MCG/ACT inhaler Inhale 2 puffs into the lungs every 6 hours as needed for shortness of breath / dyspnea 18 g 3     albuterol (PROVENTIL) (2.5 MG/3ML) 0.083% neb solution Take 1 vial (2.5 mg) by nebulization every 6 hours as needed for shortness of breath / dyspnea or wheezing 25 vial 3     amLODIPine (NORVASC) 10 MG tablet Take 1 tablet (10 mg) by mouth daily 90 tablet 1     atorvastatin (LIPITOR) 80 MG tablet Take 1 tablet (80 mg) by mouth daily 90 tablet 1     azithromycin 250 MG PO tablet Two tablets first day, then one tablet daily for four days. 6 tablet 0     benzoyl peroxide 10 % EX external gel Apply topically 2 times daily 90 g 3     budesonide-formoterol (SYMBICORT) 160-4.5 MCG/ACT IN Inhaler Inhale 2 puffs into the lungs 2 times daily 3 Inhaler 1     buPROPion 150 MG PO 24 hr tablet Take 1 tablet (150 mg) by mouth every morning 90 tablet 1     cetirizine HCl 10 MG CAPS Take 1 capsule (10 mg) by mouth daily as needed Takes in the spring. 90 capsule 3     clindamycin 1 % EX external gel Apply topically 2 times daily 60 g 3     D3-50 51855 units capsule TAKE ONE CAPSULE BY MOUTH EVERY 7 DAYS  (AFTER THIS PRESCRIPTION, PATIENT TO CONTINUE WITH VITAMIN D3 2,000 UNITS) OVER THE COUNTER 13 capsule 0     desonide 0.05 % EX external cream Apply topically as needed (rash) 60 g 0     doxycycline hyclate 100 MG PO tablet Take 1 tablet (100 mg) by mouth 2 times daily 90 tablet 1     fluconazole (DIFLUCAN) 150 MG tablet Take 1 tablet and repeat another tablet in 3 days if needed 2 tablet 0     fluticasone 50 MCG/ACT NA nasal spray Spray 2 sprays in nostril daily 16 g 5     furosemide (LASIX) 20 MG tablet Take 1 tablet (20 mg) by mouth daily 90 tablet 1     hydrOXYzine (ATARAX) 50 MG tablet TAKE ONE TO TWO TABLETS BY MOUTH FOUR TIMES A DAY AS NEEDED FOR  ANXIETY 180  tablet 0     IBUPROFEN PO Take 600 mg by mouth every 6 hours as needed for moderate pain       lisinopril (PRINIVIL/ZESTRIL) 20 MG tablet Take 1 tablet (20 mg) by mouth daily 90 tablet 1     montelukast (SINGULAIR) 10 MG tablet TAKE ONE TABLET BY MOUTH AT BEDTIME 90 tablet 0     nicotine (EQ NICOTINE POLACRILEX) 2 MG lozenge Place 1 lozenge (2 mg) inside cheek every hour as needed for smoking cessation 360 lozenge 1     nicotine (NICORETTE) 2 MG gum Place 1 each (2 mg) inside cheek as needed for smoking cessation 150 each 1     nicotine 14 MG/24HR TD 24 hr patch Place 1 patch onto the skin every 24 hours 30 patch 1     nicotine polacrilex (RA NICOTINE GUM) 2 MG gum Place 2 mg inside cheek as needed for smoking cessation       nitroGLYcerin (NITROSTAT) 0.4 MG sublingual tablet FOR CHEST PAIN PLACE ONE TABLET UNDER THE TONGUE EVERY 5 MINUTES FOR 3 DOSES.  IF SYPTOMS PERSIST 5 MINUTES AFTER 1ST DOSE CALL 911 25 tablet 0     nystatin 256662 UNIT/GM EX external powder Apply topically 2 times daily as needed (skin rash) 60 g 11     omeprazole 20 MG tablet Take 1 tablet (20 mg) by mouth 2 times daily 180 tablet 1     order for DME Equipment being ordered: Digital home blood pressure monitor kit 1 Device 0     order for DME Equipment being ordered: Pulse Oxymeter 1 Device 0     predniSONE 20 MG PO tablet Take 40 mg daily for 5 days then continue with 20 mg 350 tablet 1     tiotropium (SPIRIVA HANDIHALER) 18 MCG IN inhaled capsule Inhale contents of one capsule daily. 90 capsule 3     varenicline (CHANTIX STARTING MONTH PAK) 0.5 MG X 11 & 1 MG X 42 tablet Take 0.5 mg tab daily for 3 days, then 0.5 mg tab twice daily for 4 days, then 1 mg twice daily. (Patient not taking: Reported on 2/20/2020) 53 tablet 0     varenicline (CHANTIX) 1 MG tablet Take 1 tablet (1 mg) by mouth 2 times daily (Patient not taking: Reported on 2/20/2020) 60 tablet 3       Soc Hx: reviewed  Fam Hx: reviewed    The information in this document, created  by the medical scribe for me, accurately reflects the services I personally performed and the decisions made by me. I have reviewed and approved this document for accuracy prior to leaving the patient care area.  March 6, 2020 12:02 PM     Time spent with the patient  45 min, more than 50% in counseling and coordinating care, Re above medical problems COPD, anxiety, recent blood test results, claudication, talking to the surgical office, scheduling her for next appointment      Roro Low MD  Internal Medicine

## 2020-03-09 ENCOUNTER — OFFICE VISIT (OUTPATIENT)
Dept: INTERNAL MEDICINE | Facility: CLINIC | Age: 50
End: 2020-03-09
Payer: COMMERCIAL

## 2020-03-09 VITALS
DIASTOLIC BLOOD PRESSURE: 70 MMHG | RESPIRATION RATE: 16 BRPM | OXYGEN SATURATION: 98 % | SYSTOLIC BLOOD PRESSURE: 102 MMHG | WEIGHT: 233 LBS | BODY MASS INDEX: 41.27 KG/M2 | HEART RATE: 75 BPM

## 2020-03-09 DIAGNOSIS — M26.69 JAW CLAUDICATION: ICD-10-CM

## 2020-03-09 DIAGNOSIS — I10 HTN, GOAL BELOW 140/90: ICD-10-CM

## 2020-03-09 DIAGNOSIS — J44.9 COPD, MODERATE (H): ICD-10-CM

## 2020-03-09 DIAGNOSIS — I73.9 CLAUDICATION OF BOTH LOWER EXTREMITIES (H): ICD-10-CM

## 2020-03-09 DIAGNOSIS — Z01.818 PREOP GENERAL PHYSICAL EXAM: Primary | ICD-10-CM

## 2020-03-09 PROCEDURE — 99214 OFFICE O/P EST MOD 30 MIN: CPT | Performed by: INTERNAL MEDICINE

## 2020-03-09 NOTE — PROGRESS NOTES
[place] [address]  [facility] [address]  [phone]  Dept: [phone]    PRE-OP EVALUATION:  Today's date: 3/9/2020    Swetha Patterson (: 1970) presents for pre-operative evaluation assessment as requested by Dr. Mcdonough.  She requires evaluation and anesthesia risk assessment prior to undergoing surgery/procedure for treatment of Jaw claudication .    Fax number for surgical facility: [facility]  Primary Physician: Roro Chawla  Type of Anesthesia Anticipated: to be determined    Patient has a Health Care Directive or Living Will:  NO    Preop Questions 3/9/2020   Who is doing your surgery? dr mcdonough   What are you having done? left temporal biopsy   Date of Surgery/Procedure: 3  /11/20   Facility or Hospital where procedure/surgery will be performed: [place] [facility] patient [facility]   1.  Do you have a history of Heart attack, stroke, stent, coronary bypass surgery, or other heart surgery? No   2.  Do you ever have any pain or discomfort in your chest? No   3.  Do you have a history of  Heart Failure? No   4.   Are you troubled by shortness of breath when:  walking on a level surface, or up a slight hill, or at night? YES - chronic SOB   5.  Do you currently have a cold, bronchitis or other respiratory infection? No   6.  Do you have a cough, shortness of breath, or wheezing? YES - chr cough    7.  Do you sometimes get pains in the calves of your legs when you walk? YES -    8. Do you or anyone in your family have previous history of blood clots? No   9.  Do you or does anyone in your family have a serious bleeding problem such as prolonged bleeding following surgeries or cuts? No   10. Have you ever had problems with anemia or been told to take iron pills? No   11. Have you had any abnormal blood loss such as black, tarry or bloody stools, or abnormal vaginal bleeding? No   12. Have you ever had a blood transfusion? No   13. Have you or any of your  relatives ever had problems with anesthesia? No   14. Do you have sleep apnea, excessive snoring or daytime drowsiness? No   15. Do you have any prosthetic heart valves? No   16. Do you have prosthetic joints? No   17. Is there any chance that you may be pregnant? No       CC:  Preop for multiple medical problems.    HPI:    The patient is scheduled for temporal artery biopsy surgery with Dr. Conner  on  March 11, 2020  No other acute complaints.    Assessment:  1. V72.83H Preop general physical exam _ I do not see any major contraindications for the patient to go through the scheduled surgery.    The proposed surgical procedure is considered  LOW  surgery risk.    For above listed surgery and anesthesia, Patient is at  MODERATE risk for surgery/procedure and perioperative/procedure complications.        Cardiovascular risk  Assessment -- low risk   -- Neg cardiac work  Up June 2019  -- No further cardiac work up is needed before this surgery.        ECG:   June 2019:  sinus rhythm, no changes suggestive for ischemia    Pulmonary Risk Assessment - low risk   -- the patient has chr lung disease, it is stable. The patient will use inhalers/nebs before and after surgery.   -- Advised smoking cessation    Obstructive Sleep Apnea (or suspected sleep apnea) -- low risk     Anemia Assessment :  -- no anemia - patient doesn't need further anemia work up      Blood Sugar Assessment  -- patient has controlled prediabetes  DM,     Anticoagulation assessment  -- patient does not take anticoagulation meds      Functional Status Assessment  POOR ( < 4 METS) -  Vacuuming\Activities of daily living (e.g., eating, dressing, bathing)\Walking 2 mph\Writings    (M26.69) Jaw claudication  Comment: ? Giant cell arteritis   Plan: biopsy     (I10) HTN, goal below 140/90  Comment: Controlled    Plan: Continue same meds, same doses for now     (J44.9) COPD, moderate (H)  Comment: stable   Plan: Continue same meds, same doses for now      (I73.9) Claudication of both lower extremities (H)  Comment:   Plan:        Plan:  1. In the morning of the surgery day you take with a sip of water just these meds:  -- Prednisone 20 mg. The rest of the meds you resume after surgery.  -- take the Amlodipine and Lisinopril when you usually take them   -- take all your inhalers in that morning  2. Keep the appointment on March 19      Physical exam:    Blood pressure 102/70, pulse 75, resp. rate 16, weight 105.7 kg (233 lb), last menstrual period 04/15/2016, SpO2 98 %, not currently breastfeeding.   NAD, appears comfortable  Skin clear, no rashes  Neck: supple, no JVD,  no thyroidmegaly  Lymph nodes non palpable in the cervical, supraclavicular   Chest: clear to auscultation with good respiratory effort  Cardiac: S1S2, RRR, no mgr appreciated  Abdomen: soft, not tender,   Extremities: no cyanosis, clubbing or edema.   Neuro: A, Ox3, no focal signs.        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     DIAZ (nonalcoholic steatohepatitis)     Cervical HNP C5-6     Opioid type dependence, continuous (H)     Suicide attempt (H)     Intentional drug overdose (H)     Status post cervical spinal fusion     Depression     Vocal cord polyp     HTN, goal below 140/90     NO SHOW     COPD, moderate (H)     History of opioid abuse (H) Rx was stopped when she failed urine drug test     Claudication of both lower extremities (H)     PAD (peripheral artery disease) (H)     Jaw claudication        Past Medical History:   Diagnosis Date     Anxiety state, unspecified      Asthma      Chronic pain     back pain from cyst     Contact dermatitis and other eczema, due to unspecified cause      COPD (chronic obstructive pulmonary disease) (H)      Depressive  "disorder, not elsewhere classified      Emphysema with chronic bronchitis (H)      Esophageal reflux      Family history of ischemic heart disease      Gastro-oesophageal reflux disease      History of emphysema      Hoarseness      HTN, goal below 140/90     No cardilogist     Hyperlipidemia LDL goal <130      Liver disease     \"fatty liver\"     Other chronic pain     chest, from coughing     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)      PONV (postoperative nausea and vomiting)       Past Surgical History:   Procedure Laterality Date     ARTHROSCOPY SHOULDER DECOMPRESSION Left 10/21/2015    Procedure: ARTHROSCOPY SHOULDER DECOMPRESSION;  Surgeon: Julien Milian MD;  Location: [facility] OR     BRONCHIAL THERMOPLASTY N/A 11/14/2014    Procedure: BRONCHIAL THERMOPLASTY;  Surgeon: Ward Whitaker MD;  [facility]     BRONCHIAL THERMOPLASTY N/A 12/19/2014    Procedure: BRONCHIAL THERMOPLASTY;  Surgeon: Ward Whitaker MD;  [facility]     BRONCHIAL THERMOPLASTY N/A 2/6/2015    Procedure: BRONCHIAL THERMOPLASTY;  Surgeon: Ward Whitaker MD;  [facility]     C APPENDECTOMY  at age 17     COLONOSCOPY N/A 11/26/2018    Procedure: COLONOSCOPY ([facility]);  Surgeon: Marshall Oakes MD;  Location: [facility] OR     DISCECTOMY, FUSION CERVICAL ANTERIOR ONE LEVEL, COMBINED N/A 5/1/2018    Procedure: COMBINED DISCECTOMY, FUSION CERVICAL ANTERIOR ONE LEVEL;  1.  C5-C6 anterior cervical diskectomy and fusion.    2.  C5-C6 application of intervertebral biomechanical device for interbody fusion purposes.    3.  C5-C6 anterior instrumentation using the standard Kristin InViZia 24 mm plate with four associated bone screws, 12 mm in length.  Inferior screws are fixed.  Superior screws are va     EXCISE NODE MEDIASTINAL  4/26/2013    Procedure: EXCISE NODE MEDIASTINAL;;  Surgeon: Av Peña MD;  Location: [facility] OR     LAPAROSCOPIC CHOLECYSTECTOMY N/A 10/19/2017    Procedure: LAPAROSCOPIC CHOLECYSTECTOMY; "  LAPAROSCOPIC CHOLECYSTECTOMY;  Surgeon: Ney Jerry MD;  Location: [facility] OR     THORACOSCOPY  4/26/2013    Procedure: THORACOSCOPY;  LEFT VIDEO ASSISTED THORACOSCOPY, RESECTION OF POSTERIOR MEDIASTINAL MASS;  Surgeon: Av Peña MD;  Location: [facility] OR     TONSILLECTOMY  as a kid     TONSILLECTOMY          PSHx: No complications with prior surgeries or anesthesia     Soc Hx: No daily alcohol, no smoking she cut down on smoking : 2-3 cigarette a day     Family History   Problem Relation Age of Onset     Heart Disease Mother         murmur, mi     Hypertension Mother      Cerebrovascular Disease Mother      Depression Mother      Gallbladder Disease Mother      Asthma Mother      Family History Negative Father         does not know  him     Family History Negative Brother      Heart Disease Maternal Grandfather      Asthma Maternal Grandfather      Hypertension Maternal Grandfather      Cerebrovascular Disease Maternal Grandfather      Diabetes Maternal Grandfather      Asthma Sister      Hypertension Sister      Cerebrovascular Disease Sister      Hypertension Maternal Grandmother      Cerebrovascular Disease Maternal Grandmother         All: reviewed    Meds: reviewed  Current Outpatient Medications   Medication Sig Dispense Refill     albuterol (PROAIR HFA/PROVENTIL HFA/VENTOLIN HFA) 108 (90 Base) MCG/ACT inhaler Inhale 2 puffs into the lungs every 6 hours as needed for shortness of breath / dyspnea 18 g 3     albuterol (PROVENTIL) (2.5 MG/3ML) 0.083% neb solution Take 1 vial (2.5 mg) by nebulization every 6 hours as needed for shortness of breath / dyspnea or wheezing 25 vial 3     amLODIPine (NORVASC) 10 MG tablet Take 1 tablet (10 mg) by mouth daily 90 tablet 1     atorvastatin (LIPITOR) 80 MG tablet Take 1 tablet (80 mg) by mouth daily 90 tablet 1     azithromycin 250 MG PO tablet Two tablets first day, then one tablet daily for four days. 6 tablet 0     benzoyl peroxide 10 % EX external gel  Apply topically 2 times daily 90 g 3     budesonide-formoterol (SYMBICORT) 160-4.5 MCG/ACT IN Inhaler Inhale 2 puffs into the lungs 2 times daily 3 Inhaler 1     buPROPion 150 MG PO 24 hr tablet Take 1 tablet (150 mg) by mouth every morning 90 tablet 1     cetirizine HCl 10 MG CAPS Take 1 capsule (10 mg) by mouth daily as needed Takes in the spring. 90 capsule 3     clindamycin 1 % EX external gel Apply topically 2 times daily 60 g 3     D3-50 41689 units capsule TAKE ONE CAPSULE BY MOUTH EVERY 7 DAYS  (AFTER THIS PRESCRIPTION, PATIENT TO CONTINUE WITH VITAMIN D3 2,000 UNITS) OVER THE COUNTER 13 capsule 0     desonide 0.05 % EX external cream Apply topically as needed (rash) 60 g 0     doxycycline hyclate 100 MG PO tablet Take 1 tablet (100 mg) by mouth 2 times daily 90 tablet 1     fluconazole (DIFLUCAN) 150 MG tablet Take 1 tablet and repeat another tablet in 3 days if needed 2 tablet 0     fluticasone 50 MCG/ACT NA nasal spray Spray 2 sprays in nostril daily 16 g 5     furosemide (LASIX) 20 MG tablet Take 1 tablet (20 mg) by mouth daily 90 tablet 1     hydrOXYzine (ATARAX) 50 MG tablet TAKE ONE TO TWO TABLETS BY MOUTH FOUR TIMES A DAY AS NEEDED FOR  ANXIETY 180 tablet 0     IBUPROFEN PO Take 600 mg by mouth every 6 hours as needed for moderate pain       lisinopril (PRINIVIL/ZESTRIL) 20 MG tablet Take 1 tablet (20 mg) by mouth daily 90 tablet 1     montelukast (SINGULAIR) 10 MG tablet TAKE ONE TABLET BY MOUTH AT BEDTIME 90 tablet 0     nicotine (EQ NICOTINE POLACRILEX) 2 MG lozenge Place 1 lozenge (2 mg) inside cheek every hour as needed for smoking cessation 360 lozenge 1     nicotine (NICORETTE) 2 MG gum Place 1 each (2 mg) inside cheek as needed for smoking cessation 150 each 1     nicotine 14 MG/24HR TD 24 hr patch Place 1 patch onto the skin every 24 hours 30 patch 1     nicotine polacrilex (RA NICOTINE GUM) 2 MG gum Place 2 mg inside cheek as needed for smoking cessation       nitroGLYcerin (NITROSTAT) 0.4  MG sublingual tablet FOR CHEST PAIN PLACE ONE TABLET UNDER THE TONGUE EVERY 5 MINUTES FOR 3 DOSES.  IF SYPTOMS PERSIST 5 MINUTES AFTER 1ST DOSE CALL 911 25 tablet 0     nystatin 981435 UNIT/GM EX external powder Apply topically 2 times daily as needed (skin rash) 60 g 11     omeprazole 20 MG tablet Take 1 tablet (20 mg) by mouth 2 times daily 180 tablet 1     order for DME Equipment being ordered: Digital home blood pressure monitor kit 1 Device 0     order for DME Equipment being ordered: Pulse Oxymeter 1 Device 0     predniSONE 20 MG PO tablet Take 40 mg daily for 5 days then continue with 20 mg 350 tablet 1     tiotropium (SPIRIVA HANDIHALER) 18 MCG IN inhaled capsule Inhale contents of one capsule daily. 90 capsule 3     varenicline (CHANTIX) 1 MG tablet Take 1 tablet (1 mg) by mouth 2 times daily 60 tablet 3            Roro Low MD  Internal Medicine       MEDICAL HISTORY:     Patient Active Problem List    Diagnosis Date Noted     Claudication of both lower extremities (H) 03/06/2020     Priority: Medium     PAD (peripheral artery disease) (H) 03/06/2020     Priority: Medium     Jaw claudication 03/06/2020     Priority: Medium     Added automatically from request for surgery [id number]       History of opioid abuse (H) Rx was stopped when she failed urine drug test 12/13/2019     Priority: Medium     NO SHOW 05/09/2019     Priority: Medium     HTN, goal below 140/90 01/12/2019     Priority: Medium     Vocal cord polyp 06/28/2018     Priority: Medium     Status post cervical spinal fusion 05/01/2018     Priority: Medium     Suicide attempt (H) 02/08/2018     Priority: Medium     Intentional drug overdose (H) 02/08/2018     Priority: Medium     Cervical HNP C5-6 11/09/2017     Priority: Medium     Opioid type dependence, continuous (H) 11/09/2017     Priority: Medium     DIAZ (nonalcoholic steatohepatitis) 06/17/2016     Priority: Medium     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  Doesn't drink, but relapsed (doesn't remember).  Was on Valium for 4 years (until October 2017). Xanax since 11/2017.  Managed by hospital.        Past Medical History:   Diagnosis Date     Anxiety state, unspecified      Asthma      Chronic pain     back pain from cyst     Contact dermatitis and other eczema, due to unspecified cause      COPD (chronic obstructive pulmonary disease) (H)      Depressive disorder, not elsewhere classified      Emphysema with chronic bronchitis (H)      Esophageal reflux      Family history of ischemic heart disease      Gastro-oesophageal reflux disease      History of emphysema      Hoarseness      HTN, goal below 140/90     No cardilogist     Hyperlipidemia LDL goal <130      Liver disease     \"fatty liver\"     Other chronic pain     chest, from coughing     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)      PONV (postoperative nausea and vomiting)      Past Surgical History:   Procedure Laterality Date     ARTHROSCOPY SHOULDER DECOMPRESSION Left 10/21/2015    Procedure: ARTHROSCOPY SHOULDER DECOMPRESSION;  Surgeon: Julien Milian MD;  [facility]: [facility] OR     BRONCHIAL THERMOPLASTY N/A 11/14/2014    Procedure: BRONCHIAL THERMOPLASTY;  Surgeon: Ward Whitaker MD;  [facility]     BRONCHIAL THERMOPLASTY N/A 12/19/2014    Procedure: BRONCHIAL THERMOPLASTY;  Surgeon: Ward Whitaker MD;  [facility]     BRONCHIAL THERMOPLASTY N/A 2/6/2015    Procedure: BRONCHIAL " THERMOPLASTY;  Surgeon: Ward Whitaker MD;  [facility]     C APPENDECTOMY  at age 17     COLONOSCOPY N/A 11/26/2018    Procedure: COLONOSCOPY ([facility]);  Surgeon: Marshall Oakes MD;  Location: [facility] OR     DISCECTOMY, FUSION CERVICAL ANTERIOR ONE LEVEL, COMBINED N/A 5/1/2018    Procedure: COMBINED DISCECTOMY, FUSION CERVICAL ANTERIOR ONE LEVEL;  1.  C5-C6 anterior cervical diskectomy and fusion.    2.  C5-C6 application of intervertebral biomechanical device for interbody fusion purposes.    3.  C5-C6 anterior instrumentation using the standard Kristin InViZia 24 mm plate with four associated bone screws, 12 mm in length.  Inferior screws are fixed.  Superior screws are va     EXCISE NODE MEDIASTINAL  4/26/2013    Procedure: EXCISE NODE MEDIASTINAL;;  Surgeon: Av Peña MD;  Location:  OR     LAPAROSCOPIC CHOLECYSTECTOMY N/A 10/19/2017    Procedure: LAPAROSCOPIC CHOLECYSTECTOMY;  LAPAROSCOPIC CHOLECYSTECTOMY;  Surgeon: Ney Jerry MD;  Location:  OR     THORACOSCOPY  4/26/2013    Procedure: THORACOSCOPY;  LEFT VIDEO ASSISTED THORACOSCOPY, RESECTION OF POSTERIOR MEDIASTINAL MASS;  Surgeon: Av Peña MD;  Location:  OR     TONSILLECTOMY  as a kid     TONSILLECTOMY       Current Outpatient Medications   Medication Sig Dispense Refill     albuterol (PROAIR HFA/PROVENTIL HFA/VENTOLIN HFA) 108 (90 Base) MCG/ACT inhaler Inhale 2 puffs into the lungs every 6 hours as needed for shortness of breath / dyspnea 18 g 3     albuterol (PROVENTIL) (2.5 MG/3ML) 0.083% neb solution Take 1 vial (2.5 mg) by nebulization every 6 hours as needed for shortness of breath / dyspnea or wheezing 25 vial 3     amLODIPine (NORVASC) 10 MG tablet Take 1 tablet (10 mg) by mouth daily 90 tablet 1     atorvastatin (LIPITOR) 80 MG tablet Take 1 tablet (80 mg) by mouth daily 90 tablet 1     azithromycin 250 MG PO tablet Two tablets first day, then one tablet daily for four days. 6 tablet 0      benzoyl peroxide 10 % EX external gel Apply topically 2 times daily 90 g 3     budesonide-formoterol (SYMBICORT) 160-4.5 MCG/ACT IN Inhaler Inhale 2 puffs into the lungs 2 times daily 3 Inhaler 1     buPROPion 150 MG PO 24 hr tablet Take 1 tablet (150 mg) by mouth every morning 90 tablet 1     cetirizine HCl 10 MG CAPS Take 1 capsule (10 mg) by mouth daily as needed Takes in the spring. 90 capsule 3     clindamycin 1 % EX external gel Apply topically 2 times daily 60 g 3     D3-50 10785 units capsule TAKE ONE CAPSULE BY MOUTH EVERY 7 DAYS  (AFTER THIS PRESCRIPTION, PATIENT TO CONTINUE WITH VITAMIN D3 2,000 UNITS) OVER THE COUNTER 13 capsule 0     desonide 0.05 % EX external cream Apply topically as needed (rash) 60 g 0     doxycycline hyclate 100 MG PO tablet Take 1 tablet (100 mg) by mouth 2 times daily 90 tablet 1     fluconazole (DIFLUCAN) 150 MG tablet Take 1 tablet and repeat another tablet in 3 days if needed 2 tablet 0     fluticasone 50 MCG/ACT NA nasal spray Spray 2 sprays in nostril daily 16 g 5     furosemide (LASIX) 20 MG tablet Take 1 tablet (20 mg) by mouth daily 90 tablet 1     hydrOXYzine (ATARAX) 50 MG tablet TAKE ONE TO TWO TABLETS BY MOUTH FOUR TIMES A DAY AS NEEDED FOR  ANXIETY 180 tablet 0     IBUPROFEN PO Take 600 mg by mouth every 6 hours as needed for moderate pain       lisinopril (PRINIVIL/ZESTRIL) 20 MG tablet Take 1 tablet (20 mg) by mouth daily 90 tablet 1     montelukast (SINGULAIR) 10 MG tablet TAKE ONE TABLET BY MOUTH AT BEDTIME 90 tablet 0     nicotine (EQ NICOTINE POLACRILEX) 2 MG lozenge Place 1 lozenge (2 mg) inside cheek every hour as needed for smoking cessation 360 lozenge 1     nicotine (NICORETTE) 2 MG gum Place 1 each (2 mg) inside cheek as needed for smoking cessation 150 each 1     nicotine 14 MG/24HR TD 24 hr patch Place 1 patch onto the skin every 24 hours 30 patch 1     nicotine polacrilex (RA NICOTINE GUM) 2 MG gum Place 2 mg inside cheek as needed for smoking  cessation       nitroGLYcerin (NITROSTAT) 0.4 MG sublingual tablet FOR CHEST PAIN PLACE ONE TABLET UNDER THE TONGUE EVERY 5 MINUTES FOR 3 DOSES.  IF SYPTOMS PERSIST 5 MINUTES AFTER 1ST DOSE CALL 911 25 tablet 0     nystatin 074024 UNIT/GM EX external powder Apply topically 2 times daily as needed (skin rash) 60 g 11     omeprazole 20 MG tablet Take 1 tablet (20 mg) by mouth 2 times daily 180 tablet 1     order for DME Equipment being ordered: Digital home blood pressure monitor kit 1 Device 0     order for DME Equipment being ordered: Pulse Oxymeter 1 Device 0     predniSONE 20 MG PO tablet Take 40 mg daily for 5 days then continue with 20 mg 350 tablet 1     tiotropium (SPIRIVA HANDIHALER) 18 MCG IN inhaled capsule Inhale contents of one capsule daily. 90 capsule 3     varenicline (CHANTIX) 1 MG tablet Take 1 tablet (1 mg) by mouth 2 times daily (Patient not taking: Reported on 2020) 60 tablet 3     OTC products: None, except as noted above    Allergies   Allergen Reactions     Codeine Nausea and Vomiting     vomiting     Cyclobenzaprine Nausea     Hydrocodone Nausea and Vomiting     Sulfa Drugs Nausea and Vomiting     Nausea     Gabapentin Rash      Latex Allergy: NO    Social History     Tobacco Use     Smoking status: Light Tobacco Smoker     Years: 30.00     Types: Cigarettes     Last attempt to quit: 2015     Years since quittin.5     Smokeless tobacco: Never Used     Tobacco comment: 7 cigs a day   Substance Use Topics     Alcohol use: No     Alcohol/week: 0.0 standard drinks     History   Drug Use No         Recent Labs   Lab Test 20  0920 09/10/19  1159 05/10/19  1139  17  1131  13  0752   HGB 14.5 15.5 14.4   < > 14.2   < > 13.7    472* 353   < > 320   < > 336   INR  --   --   --   --  0.90  --  0.92    139 140   < > 140   < > 137   POTASSIUM 4.3 3.8 4.2   < > 4.4   < > 4.0   CR 0.71 0.77 0.88   < > 0.70   < > 0.78   A1C 6.0*  --  5.6  --   --   --   --     < > =  values in this interval not displayed.         Signed Electronically by: Roro Chawla MD    Copy of this evaluation report is provided to requesting physician.    [place] Preop Guidelines    Revised Cardiac Risk Index

## 2020-03-09 NOTE — PATIENT INSTRUCTIONS
Plan:  1. In the morning of the surgery day you take with a sip of water just these meds:  -- Prednisone 20 mg. The rest of the meds you resume after surgery.  -- take the Amlodipine and Lisinopril when you usually take them   -- take all your inhalers in that morning  2. Keep the appointment on March 19

## 2020-03-10 ENCOUNTER — TELEPHONE (OUTPATIENT)
Dept: CARDIOLOGY | Facility: CLINIC | Age: 50
End: 2020-03-10

## 2020-03-10 ENCOUNTER — TELEPHONE (OUTPATIENT)
Dept: INTERNAL MEDICINE | Facility: CLINIC | Age: 50
End: 2020-03-10

## 2020-03-10 DIAGNOSIS — F41.9 ANXIETY: ICD-10-CM

## 2020-03-10 NOTE — TELEPHONE ENCOUNTER
Reviewed pt's US SILVIA results with Dr. Tate and she did not have any further recommendations at this time.   Spoke with pt about results and that they are borderline abnormal. Pt is wanting to be seen sooner.   Informed pt that her appt can be moved up from 3/30 with Lakeshia Bryan and she can be seen by one of the vascular APPs to go over the US in more detail. Pt gave verbal understanding.   Will message scheduling to call pt to set up appt.

## 2020-03-10 NOTE — TELEPHONE ENCOUNTER
"Requested Prescriptions   Pending Prescriptions Disp Refills     hydrOXYzine (ATARAX) 50 MG tablet [Pharmacy Med Name: hydrOXYzine HCL   50MG TAB]    Last Written Prescription Date:  2/4/20  Last Fill Quantity: 180,  # refills: 0   Last office visit: 3/9/2020 with prescribing provider:  Maranda   Future Office Visit:   Next 5 appointments (look out 90 days)    Mar 17, 2020  1:50 PM CDT  Return Visit with Lesley Morales PA-C  [facility] ([facility]) [address]  [place] [id number] [phone]   Mar 19, 2020 11:00 AM CDT  SHORT with Roro Chawla MD  [facility] [address]  [facility] [id number] [phone]          180 tablet 0     Sig: TAKE ONE TO TWO TABLETS BY MOUTH FOUR TIMES A DAY AS NEEDED FOR  ANXIETY       Antihistamines Protocol Passed - 3/10/2020 11:26 AM        Passed - Recent (12 mo) or future (30 days) visit within the authorizing provider's specialty     Patient has had an office visit with the authorizing provider or a provider within the authorizing providers department within the previous 12 mos or has a future within next 30 days. See \"Patient Info\" tab in inbasket, or \"Choose Columns\" in Meds & Orders section of the refill encounter.              Passed - Patient is age 3 or older     Apply age and/or weight-based dosing for peds patients age 3 and older.    Forward request to provider for patients under the age of 3.          Passed - Medication is active on med list              "

## 2020-03-11 ENCOUNTER — ANESTHESIA EVENT (OUTPATIENT)
Dept: SURGERY | Facility: CLINIC | Age: 50
End: 2020-03-11
Payer: COMMERCIAL

## 2020-03-11 ENCOUNTER — APPOINTMENT (OUTPATIENT)
Dept: SURGERY | Facility: PHYSICIAN GROUP | Age: 50
End: 2020-03-11
Payer: COMMERCIAL

## 2020-03-11 ENCOUNTER — HOSPITAL ENCOUNTER (OUTPATIENT)
Facility: CLINIC | Age: 50
Discharge: HOME | End: 2020-03-11
Attending: SURGERY | Admitting: SURGERY
Payer: COMMERCIAL

## 2020-03-11 ENCOUNTER — ANESTHESIA (OUTPATIENT)
Dept: SURGERY | Facility: CLINIC | Age: 50
End: 2020-03-11
Payer: COMMERCIAL

## 2020-03-11 VITALS
SYSTOLIC BLOOD PRESSURE: 122 MMHG | TEMPERATURE: 98.2 F | OXYGEN SATURATION: 95 % | DIASTOLIC BLOOD PRESSURE: 77 MMHG | BODY MASS INDEX: 41.29 KG/M2 | WEIGHT: 233 LBS | RESPIRATION RATE: 18 BRPM | HEIGHT: 63 IN | HEART RATE: 86 BPM

## 2020-03-11 DIAGNOSIS — M26.69 JAW CLAUDICATION: ICD-10-CM

## 2020-03-11 LAB — INTERPRETATION ECG - MUSE: NORMAL

## 2020-03-11 PROCEDURE — 37000008 ZZH ANESTHESIA TECHNICAL FEE, 1ST 30 MIN: Performed by: SURGERY

## 2020-03-11 PROCEDURE — 25800030 ZZH RX IP 258 OP 636: Performed by: NURSE ANESTHETIST, CERTIFIED REGISTERED

## 2020-03-11 PROCEDURE — 37000009 ZZH ANESTHESIA TECHNICAL FEE, EACH ADDTL 15 MIN: Performed by: SURGERY

## 2020-03-11 PROCEDURE — 71000012 ZZH RECOVERY PHASE 1 LEVEL 1 FIRST HR: Performed by: SURGERY

## 2020-03-11 PROCEDURE — 27210794 ZZH OR GENERAL SUPPLY STERILE: Performed by: SURGERY

## 2020-03-11 PROCEDURE — 25000132 ZZH RX MED GY IP 250 OP 250 PS 637: Performed by: SURGERY

## 2020-03-11 PROCEDURE — 25000128 H RX IP 250 OP 636: Performed by: PHYSICIAN ASSISTANT

## 2020-03-11 PROCEDURE — 25000125 ZZHC RX 250: Performed by: NURSE ANESTHETIST, CERTIFIED REGISTERED

## 2020-03-11 PROCEDURE — 40000306 ZZH STATISTIC PRE PROC ASSESS II: Performed by: SURGERY

## 2020-03-11 PROCEDURE — 25000128 H RX IP 250 OP 636: Performed by: NURSE ANESTHETIST, CERTIFIED REGISTERED

## 2020-03-11 PROCEDURE — 25000128 H RX IP 250 OP 636: Performed by: ANESTHESIOLOGY

## 2020-03-11 PROCEDURE — 88305 TISSUE EXAM BY PATHOLOGIST: CPT | Performed by: SURGERY

## 2020-03-11 PROCEDURE — 25800030 ZZH RX IP 258 OP 636: Performed by: ANESTHESIOLOGY

## 2020-03-11 PROCEDURE — 71000027 ZZH RECOVERY PHASE 2 EACH 15 MINS: Performed by: SURGERY

## 2020-03-11 PROCEDURE — 93010 ELECTROCARDIOGRAM REPORT: CPT | Performed by: INTERNAL MEDICINE

## 2020-03-11 PROCEDURE — 88305 TISSUE EXAM BY PATHOLOGIST: CPT | Mod: 26 | Performed by: SURGERY

## 2020-03-11 PROCEDURE — 36000050 ZZH SURGERY LEVEL 2 1ST 30 MIN: Performed by: SURGERY

## 2020-03-11 PROCEDURE — 36000052 ZZH SURGERY LEVEL 2 EA 15 ADDTL MIN: Performed by: SURGERY

## 2020-03-11 PROCEDURE — 25000125 ZZHC RX 250: Performed by: SURGERY

## 2020-03-11 PROCEDURE — 71000013 ZZH RECOVERY PHASE 1 LEVEL 1 EA ADDTL HR: Performed by: SURGERY

## 2020-03-11 RX ORDER — EPHEDRINE SULFATE 50 MG/ML
INJECTION, SOLUTION INTRAMUSCULAR; INTRAVENOUS; SUBCUTANEOUS PRN
Status: DISCONTINUED | OUTPATIENT
Start: 2020-03-11 | End: 2020-03-11

## 2020-03-11 RX ORDER — CEFAZOLIN SODIUM 1 G/3ML
1 INJECTION, POWDER, FOR SOLUTION INTRAMUSCULAR; INTRAVENOUS SEE ADMIN INSTRUCTIONS
Status: DISCONTINUED | OUTPATIENT
Start: 2020-03-11 | End: 2020-03-11 | Stop reason: HOSPADM

## 2020-03-11 RX ORDER — ACETAMINOPHEN 325 MG/1
650 TABLET ORAL
Status: DISCONTINUED | OUTPATIENT
Start: 2020-03-11 | End: 2020-03-11 | Stop reason: HOSPADM

## 2020-03-11 RX ORDER — DEXAMETHASONE SODIUM PHOSPHATE 4 MG/ML
4 INJECTION, SOLUTION INTRA-ARTICULAR; INTRALESIONAL; INTRAMUSCULAR; INTRAVENOUS; SOFT TISSUE EVERY 10 MIN PRN
Status: DISCONTINUED | OUTPATIENT
Start: 2020-03-11 | End: 2020-03-11 | Stop reason: HOSPADM

## 2020-03-11 RX ORDER — NALOXONE HYDROCHLORIDE 0.4 MG/ML
.1-.4 INJECTION, SOLUTION INTRAMUSCULAR; INTRAVENOUS; SUBCUTANEOUS
Status: DISCONTINUED | OUTPATIENT
Start: 2020-03-11 | End: 2020-03-11 | Stop reason: HOSPADM

## 2020-03-11 RX ORDER — METOCLOPRAMIDE HYDROCHLORIDE 5 MG/ML
10 INJECTION INTRAMUSCULAR; INTRAVENOUS EVERY 6 HOURS PRN
Status: DISCONTINUED | OUTPATIENT
Start: 2020-03-11 | End: 2020-03-11 | Stop reason: HOSPADM

## 2020-03-11 RX ORDER — SODIUM CHLORIDE, SODIUM LACTATE, POTASSIUM CHLORIDE, CALCIUM CHLORIDE 600; 310; 30; 20 MG/100ML; MG/100ML; MG/100ML; MG/100ML
INJECTION, SOLUTION INTRAVENOUS CONTINUOUS
Status: DISCONTINUED | OUTPATIENT
Start: 2020-03-11 | End: 2020-03-11 | Stop reason: HOSPADM

## 2020-03-11 RX ORDER — GLYCOPYRROLATE 0.2 MG/ML
INJECTION, SOLUTION INTRAMUSCULAR; INTRAVENOUS PRN
Status: DISCONTINUED | OUTPATIENT
Start: 2020-03-11 | End: 2020-03-11

## 2020-03-11 RX ORDER — PHYSOSTIGMINE SALICYLATE 1 MG/ML
1.2 INJECTION INTRAVENOUS
Status: DISCONTINUED | OUTPATIENT
Start: 2020-03-11 | End: 2020-03-11 | Stop reason: HOSPADM

## 2020-03-11 RX ORDER — BUPIVACAINE HYDROCHLORIDE 5 MG/ML
INJECTION, SOLUTION PERINEURAL PRN
Status: DISCONTINUED | OUTPATIENT
Start: 2020-03-11 | End: 2020-03-11 | Stop reason: HOSPADM

## 2020-03-11 RX ORDER — LIDOCAINE HYDROCHLORIDE 10 MG/ML
INJECTION, SOLUTION INFILTRATION; PERINEURAL PRN
Status: DISCONTINUED | OUTPATIENT
Start: 2020-03-11 | End: 2020-03-11

## 2020-03-11 RX ORDER — DIMENHYDRINATE 50 MG/ML
25 INJECTION, SOLUTION INTRAMUSCULAR; INTRAVENOUS
Status: DISCONTINUED | OUTPATIENT
Start: 2020-03-11 | End: 2020-03-11 | Stop reason: HOSPADM

## 2020-03-11 RX ORDER — ONDANSETRON 2 MG/ML
INJECTION INTRAMUSCULAR; INTRAVENOUS PRN
Status: DISCONTINUED | OUTPATIENT
Start: 2020-03-11 | End: 2020-03-11

## 2020-03-11 RX ORDER — FENTANYL CITRATE 50 UG/ML
25-50 INJECTION, SOLUTION INTRAMUSCULAR; INTRAVENOUS
Status: DISCONTINUED | OUTPATIENT
Start: 2020-03-11 | End: 2020-03-11 | Stop reason: HOSPADM

## 2020-03-11 RX ORDER — PROPOFOL 10 MG/ML
INJECTION, EMULSION INTRAVENOUS CONTINUOUS PRN
Status: DISCONTINUED | OUTPATIENT
Start: 2020-03-11 | End: 2020-03-11

## 2020-03-11 RX ORDER — KETOROLAC TROMETHAMINE 30 MG/ML
30 INJECTION, SOLUTION INTRAMUSCULAR; INTRAVENOUS EVERY 6 HOURS PRN
Status: DISCONTINUED | OUTPATIENT
Start: 2020-03-11 | End: 2020-03-11 | Stop reason: HOSPADM

## 2020-03-11 RX ORDER — ONDANSETRON 2 MG/ML
4 INJECTION INTRAMUSCULAR; INTRAVENOUS EVERY 30 MIN PRN
Status: DISCONTINUED | OUTPATIENT
Start: 2020-03-11 | End: 2020-03-11 | Stop reason: HOSPADM

## 2020-03-11 RX ORDER — DEXAMETHASONE SODIUM PHOSPHATE 4 MG/ML
INJECTION, SOLUTION INTRA-ARTICULAR; INTRALESIONAL; INTRAMUSCULAR; INTRAVENOUS; SOFT TISSUE PRN
Status: DISCONTINUED | OUTPATIENT
Start: 2020-03-11 | End: 2020-03-11

## 2020-03-11 RX ORDER — HYDROMORPHONE HYDROCHLORIDE 1 MG/ML
.3-.5 INJECTION, SOLUTION INTRAMUSCULAR; INTRAVENOUS; SUBCUTANEOUS EVERY 10 MIN PRN
Status: DISCONTINUED | OUTPATIENT
Start: 2020-03-11 | End: 2020-03-11 | Stop reason: HOSPADM

## 2020-03-11 RX ORDER — ACETAMINOPHEN 325 MG/1
975 TABLET ORAL EVERY 6 HOURS PRN
Qty: 50 TABLET | Refills: 0 | Status: ON HOLD | OUTPATIENT
Start: 2020-03-11 | End: 2021-06-17

## 2020-03-11 RX ORDER — MEPERIDINE HYDROCHLORIDE 50 MG/ML
12.5 INJECTION INTRAMUSCULAR; INTRAVENOUS; SUBCUTANEOUS
Status: DISCONTINUED | OUTPATIENT
Start: 2020-03-11 | End: 2020-03-11 | Stop reason: HOSPADM

## 2020-03-11 RX ORDER — OXYCODONE HYDROCHLORIDE 5 MG/1
10 TABLET ORAL
Status: COMPLETED | OUTPATIENT
Start: 2020-03-11 | End: 2020-03-11

## 2020-03-11 RX ORDER — IBUPROFEN 600 MG/1
600 TABLET, FILM COATED ORAL EVERY 6 HOURS PRN
Qty: 30 TABLET | Refills: 0 | Status: ON HOLD | OUTPATIENT
Start: 2020-03-11 | End: 2020-11-20

## 2020-03-11 RX ORDER — LABETALOL 20 MG/4 ML (5 MG/ML) INTRAVENOUS SYRINGE
10
Status: DISCONTINUED | OUTPATIENT
Start: 2020-03-11 | End: 2020-03-11 | Stop reason: HOSPADM

## 2020-03-11 RX ORDER — CEFAZOLIN SODIUM 2 G/100ML
2 INJECTION, SOLUTION INTRAVENOUS
Status: COMPLETED | OUTPATIENT
Start: 2020-03-11 | End: 2020-03-11

## 2020-03-11 RX ORDER — SCOLOPAMINE TRANSDERMAL SYSTEM 1 MG/1
1 PATCH, EXTENDED RELEASE TRANSDERMAL
Status: DISCONTINUED | OUTPATIENT
Start: 2020-03-11 | End: 2020-03-11 | Stop reason: HOSPADM

## 2020-03-11 RX ORDER — METOCLOPRAMIDE 10 MG/1
10 TABLET ORAL EVERY 6 HOURS PRN
Status: DISCONTINUED | OUTPATIENT
Start: 2020-03-11 | End: 2020-03-11 | Stop reason: HOSPADM

## 2020-03-11 RX ORDER — OXYCODONE HYDROCHLORIDE 5 MG/1
5-10 TABLET ORAL EVERY 4 HOURS PRN
Qty: 6 TABLET | Refills: 0 | Status: SHIPPED | OUTPATIENT
Start: 2020-03-11 | End: 2020-04-13

## 2020-03-11 RX ORDER — PROPOFOL 10 MG/ML
INJECTION, EMULSION INTRAVENOUS PRN
Status: DISCONTINUED | OUTPATIENT
Start: 2020-03-11 | End: 2020-03-11

## 2020-03-11 RX ORDER — ONDANSETRON 4 MG/1
4 TABLET, ORALLY DISINTEGRATING ORAL EVERY 30 MIN PRN
Status: DISCONTINUED | OUTPATIENT
Start: 2020-03-11 | End: 2020-03-11 | Stop reason: HOSPADM

## 2020-03-11 RX ORDER — FENTANYL CITRATE 50 UG/ML
INJECTION, SOLUTION INTRAMUSCULAR; INTRAVENOUS PRN
Status: DISCONTINUED | OUTPATIENT
Start: 2020-03-11 | End: 2020-03-11

## 2020-03-11 RX ORDER — AMOXICILLIN 250 MG
1-2 CAPSULE ORAL 2 TIMES DAILY
Qty: 10 TABLET | Refills: 0 | Status: SHIPPED | OUTPATIENT
Start: 2020-03-11 | End: 2020-10-15

## 2020-03-11 RX ORDER — HYDRALAZINE HYDROCHLORIDE 20 MG/ML
2.5-5 INJECTION INTRAMUSCULAR; INTRAVENOUS EVERY 10 MIN PRN
Status: DISCONTINUED | OUTPATIENT
Start: 2020-03-11 | End: 2020-03-11 | Stop reason: HOSPADM

## 2020-03-11 RX ADMIN — PROPOFOL 75 MG: 10 INJECTION, EMULSION INTRAVENOUS at 11:19

## 2020-03-11 RX ADMIN — FENTANYL CITRATE 50 MCG: 50 INJECTION, SOLUTION INTRAMUSCULAR; INTRAVENOUS at 12:37

## 2020-03-11 RX ADMIN — OXYCODONE HYDROCHLORIDE 10 MG: 5 TABLET ORAL at 12:38

## 2020-03-11 RX ADMIN — PHENYLEPHRINE HYDROCHLORIDE 200 MCG: 10 INJECTION INTRAVENOUS at 11:47

## 2020-03-11 RX ADMIN — LIDOCAINE HYDROCHLORIDE 50 MG: 10 INJECTION, SOLUTION INFILTRATION; PERINEURAL at 10:54

## 2020-03-11 RX ADMIN — HYDROMORPHONE HYDROCHLORIDE 0.5 MG: 1 INJECTION, SOLUTION INTRAMUSCULAR; INTRAVENOUS; SUBCUTANEOUS at 12:19

## 2020-03-11 RX ADMIN — PROPOFOL 200 MG: 10 INJECTION, EMULSION INTRAVENOUS at 10:51

## 2020-03-11 RX ADMIN — LIDOCAINE HYDROCHLORIDE 50 MG: 10 INJECTION, SOLUTION INFILTRATION; PERINEURAL at 10:51

## 2020-03-11 RX ADMIN — MIDAZOLAM 2 MG: 1 INJECTION INTRAMUSCULAR; INTRAVENOUS at 10:47

## 2020-03-11 RX ADMIN — CEFAZOLIN SODIUM 2 G: 2 INJECTION, SOLUTION INTRAVENOUS at 11:00

## 2020-03-11 RX ADMIN — FENTANYL CITRATE 50 MCG: 50 INJECTION, SOLUTION INTRAMUSCULAR; INTRAVENOUS at 13:13

## 2020-03-11 RX ADMIN — PROPOFOL 75 MCG/KG/MIN: 10 INJECTION, EMULSION INTRAVENOUS at 10:54

## 2020-03-11 RX ADMIN — FENTANYL CITRATE 50 MCG: 50 INJECTION, SOLUTION INTRAMUSCULAR; INTRAVENOUS at 12:58

## 2020-03-11 RX ADMIN — PHENYLEPHRINE HYDROCHLORIDE 200 MCG: 10 INJECTION INTRAVENOUS at 11:38

## 2020-03-11 RX ADMIN — GLYCOPYRROLATE 0.2 MG: 0.2 INJECTION, SOLUTION INTRAMUSCULAR; INTRAVENOUS at 10:54

## 2020-03-11 RX ADMIN — PHENYLEPHRINE HYDROCHLORIDE 200 MCG: 10 INJECTION INTRAVENOUS at 11:29

## 2020-03-11 RX ADMIN — SODIUM CHLORIDE, POTASSIUM CHLORIDE, SODIUM LACTATE AND CALCIUM CHLORIDE: 600; 310; 30; 20 INJECTION, SOLUTION INTRAVENOUS at 11:55

## 2020-03-11 RX ADMIN — PHENYLEPHRINE HYDROCHLORIDE 200 MCG: 10 INJECTION INTRAVENOUS at 11:19

## 2020-03-11 RX ADMIN — FENTANYL CITRATE 100 MCG: 50 INJECTION, SOLUTION INTRAMUSCULAR; INTRAVENOUS at 10:51

## 2020-03-11 RX ADMIN — Medication 10 MG: at 11:19

## 2020-03-11 RX ADMIN — FENTANYL CITRATE 50 MCG: 50 INJECTION, SOLUTION INTRAMUSCULAR; INTRAVENOUS at 11:19

## 2020-03-11 RX ADMIN — FENTANYL CITRATE 50 MCG: 50 INJECTION, SOLUTION INTRAMUSCULAR; INTRAVENOUS at 12:16

## 2020-03-11 RX ADMIN — PHENYLEPHRINE HYDROCHLORIDE 200 MCG: 10 INJECTION INTRAVENOUS at 10:58

## 2020-03-11 RX ADMIN — Medication 10 MG: at 11:47

## 2020-03-11 RX ADMIN — FENTANYL CITRATE 100 MCG: 50 INJECTION, SOLUTION INTRAMUSCULAR; INTRAVENOUS at 10:54

## 2020-03-11 RX ADMIN — Medication 10 MG: at 11:29

## 2020-03-11 RX ADMIN — DEXAMETHASONE SODIUM PHOSPHATE 4 MG: 4 INJECTION, SOLUTION INTRA-ARTICULAR; INTRALESIONAL; INTRAMUSCULAR; INTRAVENOUS; SOFT TISSUE at 10:54

## 2020-03-11 RX ADMIN — FENTANYL CITRATE 50 MCG: 50 INJECTION, SOLUTION INTRAMUSCULAR; INTRAVENOUS at 11:30

## 2020-03-11 RX ADMIN — Medication 10 MG: at 11:38

## 2020-03-11 RX ADMIN — PHENYLEPHRINE HYDROCHLORIDE 200 MCG: 10 INJECTION INTRAVENOUS at 11:03

## 2020-03-11 RX ADMIN — PHENYLEPHRINE HYDROCHLORIDE 200 MCG: 10 INJECTION INTRAVENOUS at 11:07

## 2020-03-11 RX ADMIN — SCOPALAMINE 1 PATCH: 1 PATCH, EXTENDED RELEASE TRANSDERMAL at 11:21

## 2020-03-11 RX ADMIN — ONDANSETRON HYDROCHLORIDE 4 MG: 2 INJECTION, SOLUTION INTRAVENOUS at 11:40

## 2020-03-11 RX ADMIN — PROPOFOL 200 MG: 10 INJECTION, EMULSION INTRAVENOUS at 10:54

## 2020-03-11 RX ADMIN — SODIUM CHLORIDE, POTASSIUM CHLORIDE, SODIUM LACTATE AND CALCIUM CHLORIDE: 600; 310; 30; 20 INJECTION, SOLUTION INTRAVENOUS at 10:45

## 2020-03-11 ASSESSMENT — MIFFLIN-ST. JEOR: SCORE: 1646.01

## 2020-03-11 ASSESSMENT — COPD QUESTIONNAIRES
COPD: 1
CAT_SEVERITY: MILD

## 2020-03-11 NOTE — OP NOTE
General Surgery Operative Note     Pre-operative diagnosis: Jaw claudication [M26.69]   Post-operative diagnosis: Same    Procedure: Left temporal artery biopsy   Surgeon: Ibeth Conner MD   Assistant(s): Madonna Herrera PA-C  The Physician Assistant was medically necessary for their expertise in prepping, camera management, suctioning, suturing and retraction.   Anesthesia: General    Estimated blood loss:   5 ml           INDICATION FOR OPERATION: Patient is a 49 year old female with jaw claudication and proximal muscle weakness who is being worked up for a vasculopathy.  Her physician has referred her for a temporal artery biopsy. She has signed informed consent after discussing risks and benefits of the procedure.    ANESTHESIA: General     DESCRIPTION OF PROCEDURE:    The patient was placed supine on the operating table and anesthesia was induced. After  reaffirmation  of  the  patient's  identity,  the  procedure  to  be  performed,  and  the  site  of  the  procedure,  a sterile doppler was  brought  on  to  the  field  and location of the left temporal artery was identified just posterior to the hair line.  An incision was made over the artery and the subcutaneous tissue was dissected with electrocautery. The temporal artery was identified and its anterior surface was cleared. It was encircled with 3-0 silk ties proximally and distally for an in situ measurement of 2.5 cm. The two ends were doubly ligated with 3-0 silk ties and the intervening portion of artery was sharply dissected out. The operative field was inspected for hemostasis which was ensured. The skin was closed in two layers with interrupted 3-0 vicryl and running 4-0 monocryl suture. The  skin  was  cleaned  and  dried and  the  incision  was  dressed  with  dermabond.   The  patient  was awoken and  transferred  to  the  Post  Anesthesia  Care  Unit  in  stable  condition.  There were no complications. Sponge  and  needle  counts  were   correct   prior  to  the  completion  of  the  procedure.     Ibeth Conner MD

## 2020-03-11 NOTE — ANESTHESIA PREPROCEDURE EVALUATION
"Anesthesia Pre-Procedure Evaluation    Patient: Swetha Patterson   MRN: [id number] : 1970          Preoperative Diagnosis: Jaw claudication [M26.69]    Procedure(s):  LEFT TEMPORAL ARTERY BIOPSY    Past Medical History:   Diagnosis Date     Anxiety state, unspecified      Asthma      Chronic pain     back pain from cyst     Contact dermatitis and other eczema, due to unspecified cause      COPD (chronic obstructive pulmonary disease) (H)      Depressive disorder, not elsewhere classified      Emphysema with chronic bronchitis (H)      Esophageal reflux      Family history of ischemic heart disease      Gastro-oesophageal reflux disease      History of emphysema      Hoarseness      HTN, goal below 140/90      Hyperlipidemia LDL goal <130      Liver disease     \"fatty liver\"     Other chronic pain     chest, from coughing     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)      PONV (postoperative nausea and vomiting)      Past Surgical History:   Procedure Laterality Date     ARTHROSCOPY SHOULDER DECOMPRESSION Left 10/21/2015    Procedure: ARTHROSCOPY SHOULDER DECOMPRESSION;  Surgeon: Julien Milian MD;  Location: [facility] OR     BRONCHIAL THERMOPLASTY N/A 2014    Procedure: BRONCHIAL THERMOPLASTY;  Surgeon: Ward Whitaker MD;  Location: [facility]     BRONCHIAL THERMOPLASTY N/A 2014    Procedure: BRONCHIAL THERMOPLASTY;  Surgeon: Ward Whitaker MD;  Location: [facility]     BRONCHIAL THERMOPLASTY N/A 2015    Procedure: BRONCHIAL THERMOPLASTY;  Surgeon: Ward Whitaker MD;  Location: [facility]     C APPENDECTOMY  at age 17     COLONOSCOPY N/A 2018    Procedure: COLONOSCOPY ([facility]);  Surgeon: Marshall Oakes MD;  Location: [facility] OR     DISCECTOMY, FUSION CERVICAL ANTERIOR ONE LEVEL, COMBINED N/A 2018    Procedure: COMBINED DISCECTOMY, FUSION CERVICAL ANTERIOR ONE LEVEL;  1.  C5-C6 anterior cervical diskectomy and fusion.    2.  C5-C6 application of intervertebral biomechanical " device for interbody fusion purposes.    3.  C5-C6 anterior instrumentation using the standard Kristin InViZia 24 mm plate with four associated bone screws, 12 mm in length.  Inferior screws are fixed.  Superior screws are va     ENT SURGERY  2015    polyps removed from vocal cords      EXCISE NODE MEDIASTINAL  4/26/2013    Procedure: EXCISE NODE MEDIASTINAL;;  Surgeon: Av Peña MD;  Location:  OR     LAPAROSCOPIC CHOLECYSTECTOMY N/A 10/19/2017    Procedure: LAPAROSCOPIC CHOLECYSTECTOMY;  LAPAROSCOPIC CHOLECYSTECTOMY;  Surgeon: Ney Jerry MD;  Location:  OR     THORACOSCOPY  4/26/2013    Procedure: THORACOSCOPY;  LEFT VIDEO ASSISTED THORACOSCOPY, RESECTION OF POSTERIOR MEDIASTINAL MASS;  Surgeon: Av Peña MD;  Location:  OR     TONSILLECTOMY  as a kid     TONSILLECTOMY       Anesthesia Evaluation     . Pt has had prior anesthetic. Type: General    History of anesthetic complications   - PONV        ROS/MED HX    ENT/Pulmonary:     (+)Intermittent asthma Treatment: Inhaler prn,  mild COPD, , . .    Neurologic:       Cardiovascular:     (+) hypertension----. : . . . :. .       METS/Exercise Tolerance:     Hematologic:  - neg hematologic  ROS       Musculoskeletal:  - neg musculoskeletal ROS       GI/Hepatic:     (+) GERD Asymptomatic on medication, hepatitis liver disease,       Renal/Genitourinary:  - ROS Renal section negative       Endo:  - neg endo ROS       Psychiatric:     (+) psychiatric history anxiety and depression      Infectious Disease:  - neg infectious disease ROS       Malignancy:      - no malignancy   Other:    (+) No chance of pregnancy C-spine cleared: N/A, H/O Chronic Pain,no other significant disability                         Physical Exam  Normal systems: cardiovascular, pulmonary and dental    Airway   Mallampati: II  TM distance: >3 FB  Neck ROM: full    Dental     Cardiovascular       Pulmonary             Lab Results   Component Value Date    WBC  "10.1 02/20/2020    HGB 14.5 02/20/2020    HCT 46.0 02/20/2020     02/20/2020    CRP 16.0 (H) 02/20/2020    SED 16 02/20/2020     02/20/2020    POTASSIUM 4.3 02/20/2020    CHLORIDE 110 (H) 02/20/2020    CO2 24 02/20/2020    BUN 21 02/20/2020    CR 0.71 02/20/2020     (H) 02/20/2020    GENE 8.8 02/20/2020    MAG 1.9 03/19/2013    ALBUMIN 3.5 02/20/2020    PROTTOTAL 7.6 02/20/2020    ALT 42 02/20/2020    AST 19 02/20/2020    ALKPHOS 181 (H) 02/20/2020    BILITOTAL 0.3 02/20/2020    LIPASE 117 09/10/2019    INR 0.90 03/27/2017    TSH 1.78 02/20/2020    T4 0.80 02/13/2015    HCG Negative 10/21/2015    HCGS Negative 09/10/2019       Preop Vitals  BP Readings from Last 3 Encounters:   03/09/20 102/70   03/06/20 134/67   02/20/20 125/79    Pulse Readings from Last 3 Encounters:   03/09/20 75   03/06/20 75   02/20/20 75      Resp Readings from Last 3 Encounters:   03/09/20 16   03/06/20 18   02/20/20 19    SpO2 Readings from Last 3 Encounters:   03/09/20 98%   03/06/20 98%   02/20/20 96%      Temp Readings from Last 1 Encounters:   03/06/20 98.1  F (36.7  C)    Ht Readings from Last 1 Encounters:   02/20/20 1.6 m (5' 3\")      Wt Readings from Last 1 Encounters:   03/09/20 105.7 kg (233 lb)    Estimated body mass index is 41.27 kg/m  as calculated from the following:    Height as of 2/20/20: 1.6 m (5' 3\").    Weight as of 3/9/20: 105.7 kg (233 lb).       Anesthesia Plan      History & Physical Review  History and physical reviewed and following examination; no interval change.    ASA Status:  3 .    NPO Status:  > 8 hours    Plan for General with Intravenous induction. Maintenance will be Balanced.    PONV prophylaxis:  Dexamethasone or Solumedrol and Ondansetron (or other 5HT-3)         Postoperative Care      Consents  Anesthetic plan, risks, benefits and alternatives discussed with:  Patient.  Use of blood products discussed: Yes.   Use of blood products discussed with Patient.  Consented to blood " products.  .                 Matt Haro MD                    .

## 2020-03-11 NOTE — ANESTHESIA POSTPROCEDURE EVALUATION
Patient: Swetha Patterson    Procedure(s):  LEFT TEMPORAL ARTERY BIOPSY    Diagnosis:Jaw claudication [M26.69]  Diagnosis Additional Information: No value filed.    Anesthesia Type:  General    Note:  Anesthesia Post Evaluation    Patient location during evaluation: PACU  Patient participation: Able to fully participate in evaluation  Level of consciousness: awake and alert  Pain management: adequate  Airway patency: patent  Cardiovascular status: acceptable  Respiratory status: acceptable  Hydration status: acceptable  PONV: controlled     Anesthetic complications: None          Last vitals:  Vitals:    03/11/20 1315 03/11/20 1320 03/11/20 1325   BP:   108/67   Pulse:   83   Resp: 18 25 14   Temp:   96.7  F (35.9  C)   SpO2: 94% 93% 90%         Electronically Signed By: Matt Haro MD  March 11, 2020  1:30 PM

## 2020-03-11 NOTE — DISCHARGE INSTRUCTIONS
GENERAL ANESTHESIA OR SEDATION ADULT DISCHARGE INSTRUCTIONS   SPECIAL PRECAUTIONS FOR 24 HOURS AFTER SURGERY    IT IS NOT UNUSUAL TO FEEL LIGHT-HEADED OR FAINT, UP TO 24 HOURS AFTER SURGERY OR WHILE TAKING PAIN MEDICATION.  IF YOU HAVE THESE SYMPTOMS; SIT FOR A FEW MINUTES BEFORE STANDING AND HAVE SOMEONE ASSIST YOU WHEN YOU GET UP TO WALK OR USE THE BATHROOM.    YOU SHOULD REST AND RELAX FOR THE NEXT 24 HOURS AND YOU MUST MAKE ARRANGEMENTS TO HAVE SOMEONE STAY WITH YOU FOR AT LEAST 24 HOURS AFTER YOUR DISCHARGE.  AVOID HAZARDOUS AND STRENUOUS ACTIVITIES.  DO NOT MAKE IMPORTANT DECISIONS FOR 24 HOURS.    DO NOT DRIVE ANY VEHICLE OR OPERATE MECHANICAL EQUIPMENT FOR 24 HOURS FOLLOWING THE END OF YOUR SURGERY.  EVEN THOUGH YOU MAY FEEL NORMAL, YOUR REACTIONS MAY BE AFFECTED BY THE MEDICATION YOU HAVE RECEIVED.    DO NOT DRINK ALCOHOLIC BEVERAGES FOR 24 HOURS FOLLOWING YOUR SURGERY.    DRINK CLEAR LIQUIDS (APPLE JUICE, GINGER ALE, 7-UP, BROTH, ETC.).  PROGRESS TO YOUR REGULAR DIET AS YOU FEEL ABLE.    YOU MAY HAVE A DRY MOUTH, A SORE THROAT, MUSCLES ACHES OR TROUBLE SLEEPING.  THESE SHOULD GO AWAY AFTER 24 HOURS.    CALL YOUR DOCTOR FOR ANY OF THE FOLLOWING:  SIGNS OF INFECTION (FEVER, GROWING TENDERNESS AT THE SURGERY SITE, A LARGE AMOUNT OF DRAINAGE OR BLEEDING, SEVERE PAIN, FOUL-SMELLING DRAINAGE, REDNESS OR SWELLING.    IT HAS BEEN OVER 8 TO 10 HOURS SINCE SURGERY AND YOU ARE STILL NOT ABLE TO URINATE (PASS WATER).       Transderm Scop (Scopolamine) Patch    The Transderm Scop patch placed behind your ear helps to prevent nausea and vomiting associated with the use of anesthesia and certain analgesics used during or after many types surgery.  You will need to remove this patch after 3 days.  However, it may be removed sooner if you are no longer concerned about nausea or vomiting.      The most common side effects:  ?  dryness of the mouth  ?  drowsiness  ?  blurred vision  ?  dilation of pupils  ?  disorientation,  memory disturbances and/or confusion  ?  dizziness  ?  restlessness  ?  hallucinations  ?  difficulty urinating  ?  skin rash  ?  red or painful eyes    Avoid drinking alcohol while using Transderm Scop patch.  Be careful about driving or operating any machinery while using this medication since it may make you drowsy.  In the unlikely event that you experience pain in the eye, which may be accompanied by widening of the pupil, eye redness and blurred vision, remove the Transderm Scop Patch immediately and consult your doctor.    Removal of Transderm Scop Patch:  To remove the patch simply peel it off your skin.  Be sure to wash your hands and the area behind your ear thoroughly with soap and water.  The Transderm Scop Patch will continue to have some active ingredient after use.  Therefore, to avoid accidental contact or ingestion by children or pets, fold the used patch in half with the sticky side together and dispose in the trash out of reach of children and pets.  If you wear contact lens, be sure to wash your hands first before handling contact lens.      Removal date: 3/14/20 or before (The patch is behind your right ear)

## 2020-03-11 NOTE — ANESTHESIA CARE TRANSFER NOTE
Patient: Swetha Patterson    Procedure(s):  LEFT TEMPORAL ARTERY BIOPSY    Diagnosis: Jaw claudication [M26.69]  Diagnosis Additional Information: No value filed.    Anesthesia Type:   General     Note:  Airway :Face Mask  Patient transferred to:PACU  Comments: Pt transferred to PACU; VSS; Report to RNHandoff Report: Identifed the Patient, Identified the Reponsible Provider, Reviewed the pertinent medical history, Discussed the surgical course, Reviewed Intra-OP anesthesia mangement and issues during anesthesia, Set expectations for post-procedure period and Allowed opportunity for questions and acknowledgement of understanding      Vitals: (Last set prior to Anesthesia Care Transfer)    CRNA VITALS  3/11/2020 1129 - 3/11/2020 1207      3/11/2020             Resp Rate (observed):  (!) 2                Electronically Signed By: LEIGH Lane CRNA  March 11, 2020  12:07 PM

## 2020-03-12 LAB — COPATH REPORT: NORMAL

## 2020-03-12 RX ORDER — HYDROXYZINE HYDROCHLORIDE 50 MG/1
TABLET, FILM COATED ORAL
Qty: 180 TABLET | Refills: 0 | Status: SHIPPED | OUTPATIENT
Start: 2020-03-12 | End: 2020-04-03

## 2020-03-12 NOTE — TELEPHONE ENCOUNTER
Medication is being filled for 1 time refill only due to:  pt has upcoming appointment      Last refilled on 2/4/20  Last office visit was 3/6/20 for pre op    Next 5 appointments (look out 90 days)    Mar 17, 2020  1:50 PM CDT  Return Visit with Lesley Morales PA-C  [facility] [address]  [place] [id number] [phone]   Mar 19, 2020 11:00 AM CDT  SHORT with Roro Chawla MD  [facility] [address]  [place] [address] [phone]

## 2020-03-13 ENCOUNTER — TELEPHONE (OUTPATIENT)
Dept: CARE COORDINATION | Facility: CLINIC | Age: 50
End: 2020-03-13

## 2020-03-13 NOTE — TELEPHONE ENCOUNTER
[facility] now requests orders and shares plan of care/discharge summaries for some patients through [employer].  Please REPLY TO THIS MESSAGE OR ROUTE BACK TO THE AUTHOR in order to give authorization for orders when needed.  This is considered a verbal order, you will still receive a faxed copy of orders for signature.  Thank you for your assistance in improving collaboration for our patients.    ORDER SN 1 week 4, 3 prns    For medication management/set up under MA plan

## 2020-03-17 ENCOUNTER — VIRTUAL VISIT (OUTPATIENT)
Dept: CARDIOLOGY | Facility: CLINIC | Age: 50
End: 2020-03-17
Attending: INTERNAL MEDICINE
Payer: COMMERCIAL

## 2020-03-17 VITALS
DIASTOLIC BLOOD PRESSURE: 108 MMHG | HEIGHT: 63 IN | HEART RATE: 100 BPM | BODY MASS INDEX: 41.27 KG/M2 | SYSTOLIC BLOOD PRESSURE: 152 MMHG

## 2020-03-17 DIAGNOSIS — M79.10 MYALGIA: ICD-10-CM

## 2020-03-17 DIAGNOSIS — R06.02 SOB (SHORTNESS OF BREATH): ICD-10-CM

## 2020-03-17 DIAGNOSIS — R29.898 LEG FATIGUE: ICD-10-CM

## 2020-03-17 DIAGNOSIS — R00.2 PALPITATIONS: ICD-10-CM

## 2020-03-17 DIAGNOSIS — R09.89 OTHER SPECIFIED SYMPTOMS AND SIGNS INVOLVING THE CIRCULATORY AND RESPIRATORY SYSTEMS: ICD-10-CM

## 2020-03-17 DIAGNOSIS — Z72.0 TOBACCO ABUSE: ICD-10-CM

## 2020-03-17 PROCEDURE — 99441 ZZC PHYSICIAN TELEPHONE EVALUATION 5-10 MIN: CPT | Mod: 95 | Performed by: PHYSICIAN ASSISTANT

## 2020-03-17 NOTE — PROGRESS NOTES
"Swetha Patterson is a 49 year old female who is being evaluated via a billable telephone visit.      The patient has been notified of following:     \"This telephone visit will be conducted via a call between you and your physician/provider. We have found that certain health care needs can be provided without the need for a physical exam.  This service lets us provide the care you need with a short phone conversation.  If a prescription is necessary we can send it directly to your pharmacy.  If lab work is needed we can place an order for that and you can then stop by our lab to have the test done at a later time.    If during the course of the call the physician/provider feels a telephone visit is not appropriate, you will not be charged for this service.\"       Swetha Patterson complains of  No chief complaint on file.      I have reviewed and updated the patient's Past Medical History, Social History, Family History and Medication List.    ALLERGIES  Codeine; Cyclobenzaprine; Hydrocodone; Sulfa drugs; and Gabapentin    Aretha Yang CMA      Additional provider notes:   Seen 2/20/20 by Dr. Low for SOB and leg fatigue.  Dr. Low ordered full panel of labs, started abx and steroid burst.     Seen by Dr. Tate 2/20/20 for same sxs.  Dr. Tate recommended a statin holiday, ordered exercise ABIs to evaluate the leg fatigue.   She ordered NT pro BNP to evaluate the SOB.   2/28/20 ABIs, resting portion normal, exercise portion mildly abnormal.  Dr. Tate did review and had no further recommendations for evaluation.     Seen again 3/6/20 by Dr. Low.  She noted that in addition to the leg fatigue, she had jaw pain with eating.  Dr. Low suspected giant cell arteritis, ordered temporal artery biopsy, started prednisone.  There was no improvement in sxs off atorvastatin, so this was restarted.    3/11/20 s/p Left Temporal Artery Biopsy. Specimen negative for temporal arteritis.     Per telephone encounter " today, we reviewed the exercise SILVIA results noting that they are abnormal but just mildly.  I did agree that she should continue on the statin.   She tells me her breathing improved with the antibiotics and steroids.   She still has leg and arm fatigue.  When she raises her arms they are so fatigued.  She has jaw fatigue from chewing.   She does not have jaw fatigue or discomfort with other cardiovascular exertion.       Assessment/Plan:  (M79.10) Myalgia, leg fatigue, jaw fatigue  Comment: No improvement off statin, so resumed.  Exercise ABIs just mildly abnormal.  Left temporal artery biopsy negative for temporal arteritis.    Plan: Defer further evaluation to Dr. Low. ?rheumatology referral?     (R06.02) SOB (shortness of breath)  Comment: Improved with treatment with antibiotics and steroids.   Plan: No further evaluation. No diuretic needed at this time.     PAD  Comment: Exercise ABIs mildly abnormal.    Plan:  Continue atorvastatin. Start ASA 81 mg.  Should there be no other etiology for her leg fatigue, we could consider PAD rehab, though the level of leg fatigue seems out of proportion to the SILVIA results.       I have reviewed the note as documented above.  This accurately captures the substance of my conversation with the patient.      Phone call contact time  Call Started at 1422  Call Ended at 1427    Lesley Morales PA-C

## 2020-03-18 ENCOUNTER — TELEPHONE (OUTPATIENT)
Dept: INTERNAL MEDICINE | Facility: CLINIC | Age: 50
End: 2020-03-18

## 2020-03-19 DIAGNOSIS — J44.9 COPD, MODERATE (H): Primary | ICD-10-CM

## 2020-03-19 NOTE — TELEPHONE ENCOUNTER
RECORDS RECEIVED FROM: internal    DATE RECEIVED: 6.29.20    NOTES STATUS DETAILS   OFFICE NOTE from referring provider Internal 3.6.20 Cammy   OFFICE NOTE from other specialist N/A    DISCHARGE SUMMARY from hospital Internal 12.6.18,    DISCHARGE REPORT from the ER Internal 9/10/19,    MEDICATION LIST Internal    IMAGING  (NEED IMAGES AND REPORTS)     CT SCAN Internal 12.26.18,    CHEST XRAY (CXR) Internal 5.10.19, 1.2.19, 12.26.18, 11.4.18, 7/30/18, 6.28.18,    TESTS     PULMONARY FUNCTION TESTING (PFT) In process 6.29.20        Action 3.19.20 sv    Action Taken Message sent to nurse to place CXR and PFT order

## 2020-03-20 ENCOUNTER — TELEPHONE (OUTPATIENT)
Dept: RHEUMATOLOGY | Facility: CLINIC | Age: 50
End: 2020-03-20

## 2020-03-20 DIAGNOSIS — J44.9 COPD, MODERATE (H): ICD-10-CM

## 2020-03-20 DIAGNOSIS — J30.89 DUST ALLERGY: ICD-10-CM

## 2020-03-20 NOTE — TELEPHONE ENCOUNTER
"Requested Prescriptions   Pending Prescriptions Disp Refills     montelukast (SINGULAIR) 10 MG tablet [Pharmacy Med Name: MONTELUKAST SODIUM 10MG TABS] 90 tablet 0     Sig: TAKE ONE TABLET BY MOUTH AT BEDTIME   Last Written Prescription Date:  12/12/2019  Last Fill Quantity: 90,  # refills: 0   Last office visit: 3/9/2020 with prescribing provider:     Future Office Visit:      Leukotriene Inhibitors Protocol Failed - 3/20/2020  2:33 PM        Failed - Asthma control assessment score within normal limits in last 6 months     Please review ACT score.           Passed - Patient is age 12 or older     If patient is under 16, ok to refill using age based dosing.           Passed - Medication is active on med list        Passed - Recent (6 mo) or future (30 days) visit within the authorizing provider's specialty     Patient had office visit in the last 6 months or has a visit in the next 30 days with authorizing provider or within the authorizing provider's specialty.  See \"Patient Info\" tab in inbasket, or \"Choose Columns\" in Meds & Orders section of the refill encounter.               "

## 2020-03-20 NOTE — TELEPHONE ENCOUNTER
[facility] Call Center    Phone Message    May a detailed message be left on voicemail: yes     Reason for Call: Other: Patient is being referred by Roro Oneal to be seen for muscle pain and muscle weakness (generalized). Referral in Epic. Please call patient to review/schedule.     Action Taken: Message routed to:  [facility] ([facility] Rheumatology    Travel Screening: Not Applicable

## 2020-03-23 RX ORDER — MONTELUKAST SODIUM 10 MG/1
TABLET ORAL
Qty: 90 TABLET | Refills: 0 | Status: SHIPPED | OUTPATIENT
Start: 2020-03-23 | End: 2020-09-13

## 2020-03-24 ENCOUNTER — PRE VISIT (OUTPATIENT)
Dept: NEUROLOGY | Facility: CLINIC | Age: 50
End: 2020-03-24

## 2020-03-24 NOTE — TELEPHONE ENCOUNTER
FUTURE VISIT INFORMATION      FUTURE VISIT INFORMATION:    Date: 4/21/2020    Time: 1230pm    [place] [facility] [place]  REFERRAL INFORMATION:    Referring provider:  Dr. Chawla    Referring providers clinic:  [facility]    Reason for visit/diagnosis  Muscle Weakness    RECORDS REQUESTED FROM:       Clinic name Comments Records Status Imaging Status   Internal Dr. Chawla-3/19/2020, 3/9/2020, 3/6/2020    MR Brain 7/30/2018 [facility] PACS                                    /

## 2020-03-25 ENCOUNTER — TELEPHONE (OUTPATIENT)
Dept: CARE COORDINATION | Facility: CLINIC | Age: 50
End: 2020-03-25

## 2020-03-25 NOTE — TELEPHONE ENCOUNTER
"This writer saw patient in home on 3/24. Patient had a number of questions for provider.     1. She has had new onset of itchiness in her right eye and is wondering if she needs treatment for this. Upon nurse assessment there is some redness but not significant amount. Patient reports increased \"goopiness\" coming from right eye.    2. Patient reports new dizziness, she states that she has been trying to cut back on food intake to lose weight. Her blood pressure yesterday was 100/70. She has also been checking her bp and it goes up and down, and she too has had readings around 100/70. Wondering if bp meds need any adjustment or if she needs to eat/hydrate more.     3. Patient requesting increased dose of chantix. She is currently taking 1mg BID and wants to know if you will prescribe 2mg BID so that she can get \"over the hump\" of quitting. She reports smoking 10 cigarettes over a week, typically only half a cigarette each time. She does use nicotine patches and gum but does not like lozenges.    Thank you for your time.    Henok Felipe RN on 3/25/2020 at 8:55 AM    "

## 2020-03-27 ENCOUNTER — VIRTUAL VISIT (OUTPATIENT)
Dept: INTERNAL MEDICINE | Facility: CLINIC | Age: 50
End: 2020-03-27
Payer: COMMERCIAL

## 2020-03-27 DIAGNOSIS — R21 RASH AND NONSPECIFIC SKIN ERUPTION: ICD-10-CM

## 2020-03-27 DIAGNOSIS — L73.2 HIDRADENITIS SUPPURATIVA: ICD-10-CM

## 2020-03-27 DIAGNOSIS — H10.33 ACUTE CONJUNCTIVITIS OF BOTH EYES, UNSPECIFIED ACUTE CONJUNCTIVITIS TYPE: Primary | ICD-10-CM

## 2020-03-27 DIAGNOSIS — I10 HTN, GOAL BELOW 140/90: ICD-10-CM

## 2020-03-27 DIAGNOSIS — R42 LIGHTHEADEDNESS: ICD-10-CM

## 2020-03-27 PROCEDURE — 99214 OFFICE O/P EST MOD 30 MIN: CPT | Mod: TEL | Performed by: INTERNAL MEDICINE

## 2020-03-27 RX ORDER — DOXYCYCLINE HYCLATE 100 MG
100 TABLET ORAL 2 TIMES DAILY
Qty: 180 TABLET | Refills: 0 | Status: SHIPPED | OUTPATIENT
Start: 2020-03-27 | End: 2020-10-15

## 2020-03-27 RX ORDER — AMLODIPINE BESYLATE 5 MG/1
5 TABLET ORAL DAILY
Qty: 90 TABLET | Refills: 0 | Status: SHIPPED | OUTPATIENT
Start: 2020-03-27 | End: 2020-06-03

## 2020-03-27 RX ORDER — OFLOXACIN 3 MG/ML
1-2 SOLUTION/ DROPS OPHTHALMIC 4 TIMES DAILY
Qty: 10 ML | Refills: 0 | Status: SHIPPED | OUTPATIENT
Start: 2020-03-27 | End: 2020-06-03

## 2020-03-27 RX ORDER — METRONIDAZOLE 7.5 MG/G
GEL TOPICAL 2 TIMES DAILY
Qty: 45 G | Refills: 1 | Status: SHIPPED | OUTPATIENT
Start: 2020-03-27 | End: 2020-10-15

## 2020-03-27 NOTE — PROGRESS NOTES
This is a telephone encounter with the patient.       11:00 --- 11:15 am        Dr Low's note      Patient's instructions / PLAN:                                                        Plan:  1. Decrease Amlodipine to 5 mg daily   2. Ocuflox eye drops   3. Metrogel twice a day to face rash   4. Follow up Phone Visit in a month         For info about how to use My Chart: call   [facility] Help Line [phone]   [employer] Log-on Page: [email or web address]  MyChart [facility] Page: [email or web address]     ASSESSMENT & PLAN:                                                      (H10.33) Acute conjunctivitis of both eyes, unspecified acute conjunctivitis type  (primary encounter diagnosis)  Comment:   Plan: ofloxacin (OCUFLOX) 0.3 % ophthalmic solution            (I10) HTN, goal below 140/90  (R42) Lightheadedness  Comment:   -- BP on the low side with lightheadedness   Plan: amLODIPine (NORVASC) 5 MG tablet    (L73.2) Hidradenitis suppurativa  Comment:   Plan: doxycycline hyclate (VIBRA-TABS) 100 MG tablet            (R21) Rash and nonspecific skin eruption - face  Comment:   Plan: metroNIDAZOLE (METROGEL) 0.75 % external gel                 Chief complaint:                                                      eyes    SUBJECTIVE:                                                    History of present illness:    Eyes  -- they have crusts in the morning x 3 days  -- she called the eye doctor and they are closed  --     HTN  -- 100/70 checked by [facility] RN  -- she feels lightheaded sometimes     Chr skin infection - hydradenitis   -- she has Doxy caps which are big. She wants tabs  -- new rx done  -- it keeps it under control, but not resolved. She has new nodules       Skin  -- she feels eczema on the chin and under the nose  -- desonide doesn't help   -- itching   -- no pictures available  -- agrees to try Metrogel

## 2020-04-02 ENCOUNTER — TELEPHONE (OUTPATIENT)
Dept: CARE COORDINATION | Facility: CLINIC | Age: 50
End: 2020-04-02

## 2020-04-02 NOTE — TELEPHONE ENCOUNTER
I spoke with patient regarding scheduling a video appointment. Patient stated that she does not have the capability to do a virtual visit. I will send a message to the provider for further recommendations.     Magaly Pozo CMA   4/2/2020 1:59 PM

## 2020-04-02 NOTE — TELEPHONE ENCOUNTER
Patient is referred by Roro Chawla for generalized muscle pain and weakness.     Patient complains of fatigue in her jaw and legs, dry eyes and months for the last few months.     Some labs were completed and the results are as follows:    Component      Latest Ref Rng & Units 2/20/2020   Sed Rate      0 - 30 mm/h 16   CRP Inflammation      0.0 - 8.0 mg/L 16.0 (H)   CK Total      30 - 225 U/L 65     Labs that were ordered but not completed by the patient as of yet are SSA, SSB, ABRIL, CCP and rheumatoid factor.     Sending to provider for review.     Magaly Pozo CMA   4/2/2020 8:27 AM

## 2020-04-02 NOTE — TELEPHONE ENCOUNTER
Left a message for patient to return my call to discuss the scheduling options as stated below by Dr. Carrillo.     I have sent the patient a message via [employer] with these details as well.     Please transfer call to me when patient returns call.    Magaly Pozo CMA   4/2/2020 12:22 PM

## 2020-04-02 NOTE — TELEPHONE ENCOUNTER
Dx: fatigue, joint and muscle pain, elevated inflammatory markers    Plan:New patient video visit   Is patient willing to be evaluated by video at 1500 on 4-2-20 or 1000 on 4-3-20?   Thank you.

## 2020-04-02 NOTE — TELEPHONE ENCOUNTER
"This writer called patient before home care visit to screen for covid19 per protocol. Patient stated that yesterday she went to pharmacy/grocery store with her sister, came home and felt like she had been \"hit by a [employer] truck\". Patient had chest tightness, sore throat, but no fever. Patient self prescribed additional dose of doxycycline, and starting taking prednisone again yesterday, patient did the same additional meds again today. Patient reported feeling better today but patient and this writer's supervisor recommending that this writer wait to see patient until tomorrow to see if fever spikes.     Note: Patient had previously completed prednisone regimen last week and meds already have doxycycline set up BID.    Please, advise. Thank you!    Henok Felipe RN on 4/2/2020 at 1:14 PM    "

## 2020-04-02 NOTE — TELEPHONE ENCOUNTER
I do not know if this is possible under current rules.  Maximus, can we do a New patient visit by telephone only?

## 2020-04-03 ENCOUNTER — VIRTUAL VISIT (OUTPATIENT)
Dept: INTERNAL MEDICINE | Facility: CLINIC | Age: 50
End: 2020-04-03
Payer: COMMERCIAL

## 2020-04-03 DIAGNOSIS — F41.9 ANXIETY: ICD-10-CM

## 2020-04-03 DIAGNOSIS — J44.1 COPD EXACERBATION (H): Primary | ICD-10-CM

## 2020-04-03 PROCEDURE — 99214 OFFICE O/P EST MOD 30 MIN: CPT | Mod: TEL | Performed by: INTERNAL MEDICINE

## 2020-04-03 RX ORDER — AZITHROMYCIN 250 MG/1
TABLET, FILM COATED ORAL
Qty: 6 TABLET | Refills: 0 | Status: SHIPPED | OUTPATIENT
Start: 2020-04-03 | End: 2020-04-13

## 2020-04-03 RX ORDER — HYDROXYZINE HYDROCHLORIDE 50 MG/1
100 TABLET, FILM COATED ORAL 3 TIMES DAILY
Qty: 180 TABLET | Refills: 3 | Status: SHIPPED | OUTPATIENT
Start: 2020-04-03 | End: 2020-07-30

## 2020-04-03 ASSESSMENT — ANXIETY QUESTIONNAIRES
GAD7 TOTAL SCORE: 14
IF YOU CHECKED OFF ANY PROBLEMS ON THIS QUESTIONNAIRE, HOW DIFFICULT HAVE THESE PROBLEMS MADE IT FOR YOU TO DO YOUR WORK, TAKE CARE OF THINGS AT HOME, OR GET ALONG WITH OTHER PEOPLE: SOMEWHAT DIFFICULT
1. FEELING NERVOUS, ANXIOUS, OR ON EDGE: MORE THAN HALF THE DAYS
5. BEING SO RESTLESS THAT IT IS HARD TO SIT STILL: SEVERAL DAYS
2. NOT BEING ABLE TO STOP OR CONTROL WORRYING: NEARLY EVERY DAY
7. FEELING AFRAID AS IF SOMETHING AWFUL MIGHT HAPPEN: SEVERAL DAYS
6. BECOMING EASILY ANNOYED OR IRRITABLE: SEVERAL DAYS
3. WORRYING TOO MUCH ABOUT DIFFERENT THINGS: NEARLY EVERY DAY

## 2020-04-03 ASSESSMENT — PATIENT HEALTH QUESTIONNAIRE - PHQ9: 5. POOR APPETITE OR OVEREATING: NEARLY EVERY DAY

## 2020-04-03 NOTE — TELEPHONE ENCOUNTER
Call received from Henok. [facility] patient seems to be having a COPD exacerbation. [facility] patient is afebrile. Patient did start taking Prednisone and Doxycycline on her own. He is there to set up medications but can not set these medications up because they have not been prescribed. Phone visit scheduled.

## 2020-04-03 NOTE — PROGRESS NOTES
"Swetha Patterson is a 49 year old female who is being evaluated via a billable telephone visit.      The patient has been notified of following:     \"This telephone visit will be conducted via a call between you and your physician/provider. We have found that certain health care needs can be provided without the need for a physical exam.  This service lets us provide the care you need with a short phone conversation.  If a prescription is necessary we can send it directly to your pharmacy.  If lab work is needed we can place an order for that and you can then stop by our lab to have the test done at a later time.    If during the course of the call the physician/provider feels a telephone visit is not appropriate, you will not be charged for this service.\"     Patient has given verbal consent for Telephone visit?  Yes    Celia William CMA.        This is a telephone encounter with the patient.       15:43 --- 15:52          Dr Low's note      Patient's instructions / PLAN:                                                        Plan:  1. .Azithromycin 250 mg, take 2 tablets today, then 1 tablet a day for the next 4 days (  antibiotic)   2. Prednisone 20 mg   -- April 4 and 5 take 2 tabs  -- April 6, 7 take 1 tablet  -- April 8, 9 take 1/2 tablet then stop         ASSESSMENT & PLAN:                                                      (J44.1) COPD exacerbation (H)  (primary encounter diagnosis)  Comment:   Plan: azithromycin (ZITHROMAX) 250 MG tablet            (F41.9) Anxiety  Comment: Not controlled sec Covid fear  Plan: hydrOXYzine (ATARAX) 50 MG tablet               Chief complaint:                                                      cough    SUBJECTIVE:                                                    History of present illness:    Cough   -- ST, chest tightness. Resumed the Prednisone. She feels better  -- Prednisone 40 mg x 3 days -- she started by herself   --        Hyperlipidemia Follow-Up      Are you " "regularly taking any medication or supplement to lower your cholesterol?   Yes- lipitor    Are you having muscle aches or other side effects that you think could be caused by your cholesterol lowering medication?  No    Hypertension Follow-up      Do you check your blood pressure regularly outside of the clinic? No     Are you following a low salt diet? Yes    Are your blood pressures ever more than 140 on the top number (systolic) OR more   than 90 on the bottom number (diastolic), for example 140/90? No      How many servings of fruits and vegetables do you eat daily?  2-3    On average, how many sweetened beverages do you drink each day (Examples: soda, juice, sweet tea, etc.  Do NOT count diet or artificially sweetened beverages)?   0    How many days per week do you exercise enough to make your heart beat faster? 3 or less    How many minutes a day do you exercise enough to make your heart beat faster? 9 or less    How many days per week do you miss taking your medication? 0      Review of Systems:                                                      ROS: negative for fever, chills, cough, wheezes, chest pain, shortness of breath, vomiting, abdominal pain, leg swelling          OBJECTIVE:           An actual physical exam can't be done during phone visit       PMHx: reviewed  Past Medical History:   Diagnosis Date     Anxiety state, unspecified      Asthma      Chronic pain     back pain from cyst     Contact dermatitis and other eczema, due to unspecified cause      COPD (chronic obstructive pulmonary disease) (H)      Depressive disorder, not elsewhere classified      Emphysema with chronic bronchitis (H)      Esophageal reflux      Family history of ischemic heart disease      Gastro-oesophageal reflux disease      History of emphysema      Hoarseness      HTN, goal below 140/90      Hyperlipidemia LDL goal <130      Liver disease     \"fatty liver\"     Other chronic pain     chest, from coughing     Polyp of " vocal cord or larynx (aka POLYPS)      PONV (postoperative nausea and vomiting)       PSHx: reviewed  Past Surgical History:   Procedure Laterality Date     ARTHROSCOPY SHOULDER DECOMPRESSION Left 10/21/2015    Procedure: ARTHROSCOPY SHOULDER DECOMPRESSION;  Surgeon: Julien Milian MD;  Location: [facility] OR     BIOPSY ARTERY TEMPORAL Left 3/11/2020    Procedure: LEFT TEMPORAL ARTERY BIOPSY;  Surgeon: Ibeth Conner MD;  Location: [facility] OR     BRONCHIAL THERMOPLASTY N/A 11/14/2014    Procedure: BRONCHIAL THERMOPLASTY;  Surgeon: Ward Whitaker MD;  Location: [facility]     BRONCHIAL THERMOPLASTY N/A 12/19/2014    Procedure: BRONCHIAL THERMOPLASTY;  Surgeon: Ward Whitaker MD;  Location: [facility]     BRONCHIAL THERMOPLASTY N/A 2/6/2015    Procedure: BRONCHIAL THERMOPLASTY;  Surgeon: Ward Whitaker MD;  Location: [facility]     C APPENDECTOMY  at age 17     COLONOSCOPY N/A 11/26/2018    Procedure: COLONOSCOPY ([facility]);  Surgeon: Marshall Oakes MD;  Location: [facility] OR     DISCECTOMY, FUSION CERVICAL ANTERIOR ONE LEVEL, COMBINED N/A 5/1/2018    Procedure: COMBINED DISCECTOMY, FUSION CERVICAL ANTERIOR ONE LEVEL;  1.  C5-C6 anterior cervical diskectomy and fusion.    2.  C5-C6 application of intervertebral biomechanical device for interbody fusion purposes.    3.  C5-C6 anterior instrumentation using the standard Kristin InViZia 24 mm plate with four associated bone screws, 12 mm in length.  Inferior screws are fixed.  Superior screws are va     ENT SURGERY  2015    polyps removed from vocal cords      EXCISE NODE MEDIASTINAL  4/26/2013    Procedure: EXCISE NODE MEDIASTINAL;;  Surgeon: Av Peña MD;  Location:  OR     LAPAROSCOPIC CHOLECYSTECTOMY N/A 10/19/2017    Procedure: LAPAROSCOPIC CHOLECYSTECTOMY;  LAPAROSCOPIC CHOLECYSTECTOMY;  Surgeon: Ney Jerry MD;  Location:  OR     THORACOSCOPY  4/26/2013    Procedure: THORACOSCOPY;  LEFT VIDEO ASSISTED THORACOSCOPY, RESECTION OF POSTERIOR  MEDIASTINAL MASS;  Surgeon: Av Peña MD;  Location: [facility] OR     TONSILLECTOMY  as a kid     TONSILLECTOMY          Meds: reviewed  Current Outpatient Medications   Medication Sig Dispense Refill     acetaminophen (TYLENOL) 325 MG tablet Take 3 tablets (975 mg) by mouth every 6 hours as needed for pain 50 tablet 0     albuterol (PROAIR HFA/PROVENTIL HFA/VENTOLIN HFA) 108 (90 Base) MCG/ACT inhaler Inhale 2 puffs into the lungs every 6 hours as needed for shortness of breath / dyspnea 18 g 3     albuterol (PROVENTIL) (2.5 MG/3ML) 0.083% neb solution Take 1 vial (2.5 mg) by nebulization every 6 hours as needed for shortness of breath / dyspnea or wheezing 25 vial 3     amLODIPine (NORVASC) 5 MG tablet Take 1 tablet (5 mg) by mouth daily 90 tablet 0     atorvastatin (LIPITOR) 80 MG tablet Take 1 tablet (80 mg) by mouth daily 90 tablet 1     benzoyl peroxide 10 % EX external gel Apply topically 2 times daily 90 g 3     budesonide-formoterol (SYMBICORT) 160-4.5 MCG/ACT IN Inhaler Inhale 2 puffs into the lungs 2 times daily 3 Inhaler 1     buPROPion 150 MG PO 24 hr tablet Take 1 tablet (150 mg) by mouth every morning 90 tablet 1     cetirizine HCl 10 MG CAPS Take 1 capsule (10 mg) by mouth daily as needed Takes in the spring. 90 capsule 3     clindamycin 1 % EX external gel Apply topically 2 times daily 60 g 3     D3-50 19562 units capsule TAKE ONE CAPSULE BY MOUTH EVERY 7 DAYS  (AFTER THIS PRESCRIPTION, PATIENT TO CONTINUE WITH VITAMIN D3 2,000 UNITS) OVER THE COUNTER 13 capsule 0     desonide 0.05 % EX external cream Apply topically as needed (rash) 60 g 0     doxycycline hyclate (VIBRA-TABS) 100 MG tablet Take 1 tablet (100 mg) by mouth 2 times daily 180 tablet 0     fluconazole (DIFLUCAN) 150 MG tablet Take 1 tablet and repeat another tablet in 3 days if needed 2 tablet 0     fluticasone 50 MCG/ACT NA nasal spray Spray 2 sprays in nostril daily 16 g 5     furosemide (LASIX) 20 MG tablet Take 1 tablet (20  mg) by mouth daily 90 tablet 1     hydrOXYzine (ATARAX) 50 MG tablet TAKE ONE TO TWO TABLETS BY MOUTH FOUR TIMES A DAY AS NEEDED FOR  ANXIETY 180 tablet 0     ibuprofen (ADVIL/MOTRIN) 600 MG tablet Take 1 tablet (600 mg) by mouth every 6 hours as needed for pain 30 tablet 0     lisinopril (PRINIVIL/ZESTRIL) 20 MG tablet Take 1 tablet (20 mg) by mouth daily 90 tablet 1     metFORMIN (GLUCOPHAGE) 500 MG tablet Take 1 tablet (500 mg) by mouth daily (with breakfast) 30 tablet 3     metroNIDAZOLE (METROGEL) 0.75 % external gel Apply topically 2 times daily 45 g 1     montelukast (SINGULAIR) 10 MG tablet TAKE ONE TABLET BY MOUTH AT BEDTIME 90 tablet 0     nicotine (EQ NICOTINE POLACRILEX) 2 MG lozenge Place 1 lozenge (2 mg) inside cheek every hour as needed for smoking cessation 360 lozenge 1     nicotine (NICORETTE) 2 MG gum Place 1 each (2 mg) inside cheek as needed for smoking cessation 150 each 1     nicotine 14 MG/24HR TD 24 hr patch Place 1 patch onto the skin every 24 hours 30 patch 1     nitroGLYcerin (NITROSTAT) 0.4 MG sublingual tablet FOR CHEST PAIN PLACE ONE TABLET UNDER THE TONGUE EVERY 5 MINUTES FOR 3 DOSES.  IF SYPTOMS PERSIST 5 MINUTES AFTER 1ST DOSE CALL 911 25 tablet 0     nystatin 057889 UNIT/GM EX external powder Apply topically 2 times daily as needed (skin rash) 60 g 11     ofloxacin (OCUFLOX) 0.3 % ophthalmic solution Place 1-2 drops into both eyes 4 times daily 10 mL 0     omeprazole 20 MG tablet Take 1 tablet (20 mg) by mouth 2 times daily 180 tablet 1     order for DME Equipment being ordered: Digital home blood pressure monitor kit 1 Device 0     order for DME Equipment being ordered: Pulse Oxymeter 1 Device 0     oxyCODONE (ROXICODONE) 5 MG tablet Take 1-2 tablets (5-10 mg) by mouth every 4 hours as needed for moderate to severe pain 6 tablet 0     predniSONE 20 MG PO tablet Take 40 mg daily for 5 days then continue with 20 mg 350 tablet 1     senna-docusate (SENOKOT-S/PERICOLACE) 8.6-50 MG  tablet Take 1-2 tablets by mouth 2 times daily Use while taking narcotic pain medication to prevent constipation. 10 tablet 0     tiotropium (SPIRIVA HANDIHALER) 18 MCG IN inhaled capsule Inhale contents of one capsule daily. 90 capsule 3     varenicline (CHANTIX) 1 MG tablet Take 1 tablet (1 mg) by mouth 2 times daily 60 tablet 3       Soc Hx: reviewed  Fam Hx: reviewed          Roro Low MD  Internal Medicine

## 2020-04-04 ASSESSMENT — ANXIETY QUESTIONNAIRES: GAD7 TOTAL SCORE: 14

## 2020-04-07 ENCOUNTER — TELEPHONE (OUTPATIENT)
Dept: INTERNAL MEDICINE | Facility: CLINIC | Age: 50
End: 2020-04-07

## 2020-04-09 ENCOUNTER — TELEPHONE (OUTPATIENT)
Dept: CARE COORDINATION | Facility: CLINIC | Age: 50
End: 2020-04-09

## 2020-04-09 NOTE — TELEPHONE ENCOUNTER
[facility] now requests orders and shares plan of care/discharge summaries for some patients through [employer].  Please REPLY TO THIS MESSAGE OR ROUTE BACK TO THE AUTHOR in order to give authorization for orders when needed.  This is considered a verbal order, you will still receive a faxed copy of orders for signature.  Thank you for your assistance in improving collaboration for our patients.    ORDER SN 2 month 2, 1 month 1, 6 prns; PT 1 day 10; OT 1 day 10

## 2020-04-13 ENCOUNTER — VIRTUAL VISIT (OUTPATIENT)
Dept: INTERNAL MEDICINE | Facility: CLINIC | Age: 50
End: 2020-04-13
Payer: COMMERCIAL

## 2020-04-13 ENCOUNTER — TELEPHONE (OUTPATIENT)
Dept: INTERNAL MEDICINE | Facility: CLINIC | Age: 50
End: 2020-04-13

## 2020-04-13 DIAGNOSIS — L08.9 LOCAL INFECTION OF SKIN AND SUBCUTANEOUS TISSUE: Primary | ICD-10-CM

## 2020-04-13 PROCEDURE — 99442 ZZC PHYSICIAN TELEPHONE EVALUATION 11-20 MIN: CPT | Performed by: INTERNAL MEDICINE

## 2020-04-13 RX ORDER — CEPHALEXIN 500 MG/1
500 CAPSULE ORAL 4 TIMES DAILY
Qty: 28 CAPSULE | Refills: 0 | Status: SHIPPED | OUTPATIENT
Start: 2020-04-13 | End: 2020-04-30

## 2020-04-13 NOTE — TELEPHONE ENCOUNTER
Verbal order per Dr. Heaton, please have patient come into clinic for a face to face eval--r/o infection.    Spoke with patient and appointment in clinic scheduled.    Next 5 appointments (look out 90 days)    Apr 13, 2020  1:20 PM CDT  Office Visit with Christen Regan MD  [facility] ([facility] [address] [facility] [id number] [phone]

## 2020-04-13 NOTE — TELEPHONE ENCOUNTER
Pt's care coordinator calling requesting wound care orders for pt for breast wounds. She would like this to be ordered through [facility]. Care coordinator can be reached at [phone]. OK to leave a detailed message. Please advise. Thanks.

## 2020-04-13 NOTE — TELEPHONE ENCOUNTER
"Spoke with patient.  States she has an open wound (x 4 days now) on L breast in the 6:00 position.  Last night drained blood and \"white mucous\".  Fever last night was 99.7 but today temp was 98.8.    First noticed the reddened and swollen spot on 4-6-20 or 4-7-20 and then opened up 4-9-20.  States it is painful.    Should patient be seen by provider in clinic?    Travel questions negative (states she doesn't leave her house.)    Please advise, thanks.  "

## 2020-04-13 NOTE — TELEPHONE ENCOUNTER
Verbal order per Dr. Regan, will do a telephone visit instead of patient coming into clinic.    Patient informed per TYE Cox.

## 2020-04-13 NOTE — PROGRESS NOTES
"Swetha Patterson is a 49 year old female who is being evaluated via a billable telephone visit.      The patient has been notified of following:     \"This telephone visit will be conducted via a call between you and your physician/provider. We have found that certain health care needs can be provided without the need for a physical exam.  This service lets us provide the care you need with a short phone conversation.  If a prescription is necessary we can send it directly to your pharmacy.  If lab work is needed we can place an order for that and you can then stop by our lab to have the test done at a later time.    Telephone visits are billed at different rates depending on your insurance coverage. During this emergency period, for some insurers they may be billed the same as an in-person visit.  Please reach out to your insurance provider with any questions.    If during the course of the call the physician/provider feels a telephone visit is not appropriate, you will not be charged for this service.\"    Patient has given verbal consent for Telephone visit?  Yes    How would you like to obtain your AVS? Mail a copy    Subjective     Swetha Patterson is a 49 year old female who presents for phone visit today for the following health issues:    Pt noticed red bump underneath the  lt breast at the skin crease since 1 week.  was red and painful, pt says she rubbed on it while showering and it broke open about 3 days ago and  has been draining pus, pt also noticed low grade temp of 99.7.  Pt had similar cyst about 2 months and it healed on its own.    Pt has chronic lung dz and has  a home nurse who is coming in Wednesday.     No cough or SOB .       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     DIAZ (nonalcoholic " steatohepatitis)     Cervical HNP C5-6     Opioid type dependence, continuous (H)     Suicide attempt (H)     Intentional drug overdose (H)     Status post cervical spinal fusion     Depression     Vocal cord polyp     HTN, goal below 140/90     COPD, moderate (H)     History of opioid abuse (H) Rx was stopped when she failed urine drug test     Claudication of both lower extremities (H)     PAD (peripheral artery disease) (H)     Jaw claudication     Past Surgical History:   Procedure Laterality Date     ARTHROSCOPY SHOULDER DECOMPRESSION Left 10/21/2015    Procedure: ARTHROSCOPY SHOULDER DECOMPRESSION;  Surgeon: Julien Milian MD;  Location: [facility]     BIOPSY ARTERY TEMPORAL Left 3/11/2020    Procedure: LEFT TEMPORAL ARTERY BIOPSY;  Surgeon: Ibeth Conner MD;  Location: [facility]     BRONCHIAL THERMOPLASTY N/A 11/14/2014    Procedure: BRONCHIAL THERMOPLASTY;  Surgeon: Ward Whitaker MD;  [facility]     BRONCHIAL THERMOPLASTY N/A 12/19/2014    Procedure: BRONCHIAL THERMOPLASTY;  Surgeon: Ward Whitaker MD;  [facility]     BRONCHIAL THERMOPLASTY N/A 2/6/2015    Procedure: BRONCHIAL THERMOPLASTY;  Surgeon: Ward Whitaker MD;  [facility]     C APPENDECTOMY  at age 17     COLONOSCOPY N/A 11/26/2018    Procedure: COLONOSCOPY ([facility]);  Surgeon: Marshall Oakes MD;  Location: [facility]     DISCECTOMY, FUSION CERVICAL ANTERIOR ONE LEVEL, COMBINED N/A 5/1/2018    Procedure: COMBINED DISCECTOMY, FUSION CERVICAL ANTERIOR ONE LEVEL;  1.  C5-C6 anterior cervical diskectomy and fusion.    2.  C5-C6 application of intervertebral biomechanical device for interbody fusion purposes.    3.  C5-C6 anterior instrumentation using the standard Kristin InViZia 24 mm plate with four associated bone screws, 12 mm in length.  Inferior screws are fixed.  Superior screws are va     ENT SURGERY  2015    polyps removed from vocal cords      EXCISE NODE MEDIASTINAL  4/26/2013    Procedure: EXCISE NODE  MEDIASTINAL;;  Surgeon: Av Peña MD;  Location: [facility]     LAPAROSCOPIC CHOLECYSTECTOMY N/A 10/19/2017    Procedure: LAPAROSCOPIC CHOLECYSTECTOMY;  LAPAROSCOPIC CHOLECYSTECTOMY;  Surgeon: Ney Jerry MD;  Location: [facility]     THORACOSCOPY  4/26/2013    Procedure: THORACOSCOPY;  LEFT VIDEO ASSISTED THORACOSCOPY, RESECTION OF POSTERIOR MEDIASTINAL MASS;  Surgeon: Av Peña MD;  Location: [facility] OR     TONSILLECTOMY  as a kid     TONSILLECTOMY         Social History     Tobacco Use     Smoking status: Light Tobacco Smoker     Years: 30.00     Types: Cigarettes     Smokeless tobacco: Never Used     Tobacco comment: 2 cigs a day   Substance Use Topics     Alcohol use: No     Alcohol/week: 0.0 standard drinks     Family History   Problem Relation Age of Onset     Heart Disease Mother         murmur, mi     Hypertension Mother      Cerebrovascular Disease Mother      Depression Mother      Gallbladder Disease Mother      Asthma Mother      Family History Negative Father         does not know  him     Family History Negative Brother      Heart Disease Maternal Grandfather      Asthma Maternal Grandfather      Hypertension Maternal Grandfather      Cerebrovascular Disease Maternal Grandfather      Diabetes Maternal Grandfather      Asthma Sister      Hypertension Sister      Cerebrovascular Disease Sister      Hypertension Maternal Grandmother      Cerebrovascular Disease Maternal Grandmother          Current Outpatient Medications   Medication Sig Dispense Refill     acetaminophen (TYLENOL) 325 MG tablet Take 3 tablets (975 mg) by mouth every 6 hours as needed for pain 50 tablet 0     albuterol (PROAIR HFA/PROVENTIL HFA/VENTOLIN HFA) 108 (90 Base) MCG/ACT inhaler Inhale 2 puffs into the lungs every 6 hours as needed for shortness of breath / dyspnea 18 g 3     albuterol (PROVENTIL) (2.5 MG/3ML) 0.083% neb solution Take 1 vial (2.5 mg) by nebulization every 6 hours as needed for shortness of  breath / dyspnea or wheezing 25 vial 3     amLODIPine (NORVASC) 5 MG tablet Take 1 tablet (5 mg) by mouth daily 90 tablet 0     atorvastatin (LIPITOR) 80 MG tablet Take 1 tablet (80 mg) by mouth daily 90 tablet 1     benzoyl peroxide 10 % EX external gel Apply topically 2 times daily 90 g 3     budesonide-formoterol (SYMBICORT) 160-4.5 MCG/ACT IN Inhaler Inhale 2 puffs into the lungs 2 times daily 3 Inhaler 1     buPROPion 150 MG PO 24 hr tablet Take 1 tablet (150 mg) by mouth every morning 90 tablet 1     cetirizine HCl 10 MG CAPS Take 1 capsule (10 mg) by mouth daily as needed Takes in the spring. 90 capsule 3     clindamycin 1 % EX external gel Apply topically 2 times daily 60 g 3     desonide 0.05 % EX external cream Apply topically as needed (rash) 60 g 0     doxycycline hyclate (VIBRA-TABS) 100 MG tablet Take 1 tablet (100 mg) by mouth 2 times daily 180 tablet 0     fluconazole (DIFLUCAN) 150 MG tablet Take 1 tablet and repeat another tablet in 3 days if needed 2 tablet 0     fluticasone 50 MCG/ACT NA nasal spray Spray 2 sprays in nostril daily 16 g 5     Fluticasone-Umeclidin-Vilanterol (TRELEGY ELLIPTA) 100-62.5-25 MCG/INH oral inhaler Inhale 1 puff into the lungs daily 1 Inhaler 0     furosemide (LASIX) 20 MG tablet Take 1 tablet (20 mg) by mouth daily 90 tablet 1     hydrOXYzine (ATARAX) 50 MG tablet Take 2 tablets (100 mg) by mouth 3 times daily 180 tablet 3     ibuprofen (ADVIL/MOTRIN) 600 MG tablet Take 1 tablet (600 mg) by mouth every 6 hours as needed for pain 30 tablet 0     lisinopril (PRINIVIL/ZESTRIL) 20 MG tablet Take 1 tablet (20 mg) by mouth daily 90 tablet 1     metFORMIN (GLUCOPHAGE) 500 MG tablet Take 1 tablet (500 mg) by mouth daily (with breakfast) 30 tablet 3     metroNIDAZOLE (METROGEL) 0.75 % external gel Apply topically 2 times daily 45 g 1     montelukast (SINGULAIR) 10 MG tablet TAKE ONE TABLET BY MOUTH AT BEDTIME 90 tablet 0     nicotine (EQ NICOTINE POLACRILEX) 2 MG lozenge Place 1  lozenge (2 mg) inside cheek every hour as needed for smoking cessation 360 lozenge 1     nicotine (NICORETTE) 2 MG gum Place 1 each (2 mg) inside cheek as needed for smoking cessation 150 each 1     nicotine 14 MG/24HR TD 24 hr patch Place 1 patch onto the skin every 24 hours 30 patch 1     nitroGLYcerin (NITROSTAT) 0.4 MG sublingual tablet FOR CHEST PAIN PLACE ONE TABLET UNDER THE TONGUE EVERY 5 MINUTES FOR 3 DOSES.  IF SYPTOMS PERSIST 5 MINUTES AFTER 1ST DOSE CALL 911 25 tablet 0     nystatin 262987 UNIT/GM EX external powder Apply topically 2 times daily as needed (skin rash) 60 g 11     ofloxacin (OCUFLOX) 0.3 % ophthalmic solution Place 1-2 drops into both eyes 4 times daily 10 mL 0     omeprazole 20 MG tablet Take 1 tablet (20 mg) by mouth 2 times daily 180 tablet 1     order for DME Equipment being ordered: Digital home blood pressure monitor kit 1 Device 0     order for DME Equipment being ordered: Pulse Oxymeter 1 Device 0     senna-docusate (SENOKOT-S/PERICOLACE) 8.6-50 MG tablet Take 1-2 tablets by mouth 2 times daily Use while taking narcotic pain medication to prevent constipation. 10 tablet 0     tiotropium (SPIRIVA HANDIHALER) 18 MCG IN inhaled capsule Inhale contents of one capsule daily. 90 capsule 3     varenicline (CHANTIX) 1 MG tablet Take 1 tablet (1 mg) by mouth 2 times daily 60 tablet 3       Reviewed and updated as needed this visit by Provider         Review of Systems   ROS COMP: CONSTITUTIONAL: NEGATIVE for fever, chills, change in weight  INTEGUMENTARY/SKIN: open wound under lt breast   RESP: NEGATIVE for significant cough or SOB  CV: NEGATIVE for chest pain, palpitations or peripheral edema       Objective   Reported vitals:  [facility] 04/15/2016 (Exact Date)    alert and no distress  PSYCH: Alert and oriented times 3; coherent speech, normal   rate and volume, able to articulate logical thoughts, able   to abstract reason, no tangential thoughts, no hallucinations   or delusions  Her affect  is normal  RESP: No cough, no audible wheezing, able to talk in full sentences  Remainder of exam unable to be completed due to telephone visits            Assessment/Plan:    1. Local infection of skin and subcutaneous tissue  - probable infected sebaceous cyst , which is draining , stared on cephALEXin (KEFLEX) 500 MG capsule; Take 1 capsule (500 mg) by mouth 4 times daily for 7 days .explained clearly about the medication,insructions and side effects. Pt has a home nurse visit in 2 days., advised pt to have the home nurse call clinic after evaluating the pt.       11;26 am - 11;38 am     Phone call duration:  12 minutes    Christen Regan MD

## 2020-04-14 ENCOUNTER — TELEPHONE (OUTPATIENT)
Dept: INTERNAL MEDICINE | Facility: CLINIC | Age: 50
End: 2020-04-14

## 2020-04-14 NOTE — TELEPHONE ENCOUNTER
Ok for wound care but I do not know if we can do [facility] wound[facility] care as we have [facility]

## 2020-04-15 NOTE — TELEPHONE ENCOUNTER
Notified Cherelle (care coordinator) that it is preferred to use [facility] services. Cherelle is hoping to have in-home visits for patients' wound care. Cherelle states that if [facility] does not offer in-home wound care, [facility] does do in-home wound care [facility].   Brenda Serrano, CMA

## 2020-04-15 NOTE — TELEPHONE ENCOUNTER
Patient's  is Henok Felipe [phone] is currently out of the office.  Left message on his voicemail to call with his assessment of patient's breast wounds and to contact home care wound/ostomy nurse to see her if indicated.  SERJIO Chandra R.N.

## 2020-04-15 NOTE — TELEPHONE ENCOUNTER
Pt already has [facility],  Please advise [facility] to check on the wound and see if she needs wound care,  I think she is visiting pt today

## 2020-04-16 NOTE — TELEPHONE ENCOUNTER
[facility] is not part of the [facility].  Patient has a managed care insurance and should stay in the [facility] network if we can provide the service in a timely manor.

## 2020-04-16 NOTE — TELEPHONE ENCOUNTER
New patient visits must be either in person or virtual visit.     Call placed to patient without success and left a message for patient to return my call to discuss scheduling options.     Magaly Pozo CMA   4/16/2020 1:04 PM

## 2020-04-17 ENCOUNTER — TELEPHONE (OUTPATIENT)
Dept: INTERNAL MEDICINE | Facility: CLINIC | Age: 50
End: 2020-04-17

## 2020-04-21 ENCOUNTER — VIRTUAL VISIT (OUTPATIENT)
Dept: NEUROLOGY | Facility: CLINIC | Age: 50
End: 2020-04-21
Attending: INTERNAL MEDICINE

## 2020-04-21 DIAGNOSIS — M79.11 MASTICATORY MYALGIA: Primary | ICD-10-CM

## 2020-04-21 NOTE — TELEPHONE ENCOUNTER
Left a message for patient to return my call to discuss scheduling options.     Magaly Pozo, TYE   4/21/2020 10:20 AM

## 2020-04-22 ENCOUNTER — TELEPHONE (OUTPATIENT)
Dept: INTERNAL MEDICINE | Facility: CLINIC | Age: 50
End: 2020-04-22

## 2020-04-22 DIAGNOSIS — F41.9 ANXIETY: Primary | ICD-10-CM

## 2020-04-22 NOTE — PROGRESS NOTES
Note Date: 2020      TELEPHONE VIRTUAL VISIT      Roro Chawla MD   [facility]   [address]   [place]  [address]      RE: Swetha Patterson   MRN: [id number]   : 1970      Dear Dr. Chawla:      Ms. Swetha Patterson is a very pleasant 49-year-old woman who we had a virtual visit through the telephone on .  She gives a very good history and is fairly knowledgeable about her medical issues.  She reports that she has problems with weakness in her muscles and achiness as well as easy fatigability that started a couple of months ago.  She says it is hard for her to walk, she gets very tired and she hurts in her muscles.  Particularly when she chews, she will have tightness of her jaw.  She had tried prednisone 4 mg thinking it was temporal arteritis, although I do not see a history of headaches, but she did have a left temporal biopsy which was negative.  The trial of prednisone did not help her symptoms of weakness, tiredness and masticatory symptoms.  She has tried arms when she raises them up.  She has no significant difficulty with swallowing.  She was checked for peripheral vascular disease, and according to the Doppler, there is mild, especially in exercise.      She has had some medical issues in the past and this is described in the visit from Dr. Regan on  and she at that time was seen for a red bump underneath her left breast in the skin crease area, red and painful.  The diagnosis given at the time of this visit is:   1. Mediastinal cyst.   2. Family history of ischemic heart disease.   3. Hyperlipidemia.   4. Anxiety.   5. Gastroesophageal reflux.   6. 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   7. Nonalcoholic steatohepatitis.   8. Cervical disk C5-C6.   9. Opioid dependency, continues.   10. Suicide attempt.   11. Intentional drug overdose.   12. Cervical spine fusion.   13. Depression.   14. Vocal cord palsy.   15. COPD.   16.  History of opioid abuse.   17. Claudication of lower extremity.   18. Peripheral vascular disease.   19. Jaw claudication.      SOCIAL HISTORY:  She is a light smoker for 30 years.  No alcohol involved.      FAMILY HISTORY:  She reports no neuromuscular disease.  She does have a cousin with MS and a sister with early in life heart issues.      The patient reports no significant double vision or difficulty moving her eyes.  The patient does report some mild blurry vision.      ALLERGIES:  Listed as codeine, cyclobenzaprine, hydrocodone, sulfa and gabapentin.  Only gabapentin caused a rash.      On her interview, her voice seemed strong.  She says her achiness in the muscles and fatigue is perhaps getting a little worse.      In summary, we could not examine her today as there is no video available.  We do recommend that she come for a visit to complete her exam and do an EMG examination to see if she has myopathy.  She has had lab studies and rheumatology visit has been normal.  She did have a CPK several times which has been normal.      She has had last year lipase which was slightly elevated 2 years ago but a year ago was fine at 117.      She has had other outpatient visits with you or your partners and we have one from March of this year and in this it is reported again she is biopsy negative for giant cell arteritis.  CRPs are negative.  She had been on prednisone.  She had complained again of tightness in her legs and she had no obvious reason.  She will be worked up for any possible neuromuscular problem, examination and obtain an EMG.      The question of whether how much is vascular because she does have peripheral vascular disease in the table.  She says, however, she has some aching muscles when she is lying down.  She may be tried on amitriptyline.  We will probably get EBV virus titer when we see and appointment will be given.   TOTAL TELEPHONE TIME; 35 MIN     Sincerely,      Ambrocio Buck MD             MARICHUY TONG MD             D: 2020   T: 2020   MT: jaquan      Name:     KIT MILLS   MRN:      [id number]        Account:      [id number]   :      1970           Note Date: 2020      Document: [id number]

## 2020-04-22 NOTE — TELEPHONE ENCOUNTER
Patient was seen by [facility] mental health nurse for evaluation today. Pt reports her atarax is not helpful with controlling symptoms and rates anxiety a 9/10. We did discuss psych and therapy services available to her and she is accepting of both, pt is looking for a psychiatrist referral and is open to having associated clinic of psychology do in home and this referral was made. Pt still needing psychiatric referral for medication management of psych medications if you see appropriate. Please advise and thank you.

## 2020-04-24 ENCOUNTER — TELEPHONE (OUTPATIENT)
Dept: INTERNAL MEDICINE | Facility: CLINIC | Age: 50
End: 2020-04-24

## 2020-04-25 DIAGNOSIS — Z53.9 DIAGNOSIS NOT YET DEFINED: Primary | ICD-10-CM

## 2020-04-25 PROCEDURE — G0179 MD RECERTIFICATION HHA PT: HCPCS | Performed by: INTERNAL MEDICINE

## 2020-04-29 ENCOUNTER — TELEPHONE (OUTPATIENT)
Dept: CARE COORDINATION | Facility: CLINIC | Age: 50
End: 2020-04-29

## 2020-04-29 NOTE — TELEPHONE ENCOUNTER
She is schedule for virtual appointment with me tomorrow at 3:00.    Are you at her place at that time so we can toalk together ?    Shall she move the appointment for a different time when you are at her place ?     It would be good if we coordinate this

## 2020-04-29 NOTE — TELEPHONE ENCOUNTER
This writer saw patient in home today.    1. Patient has been using ofloxacin TID but states that it is not working to reduce itching, irriation. Patient would like to know if she can have different therapy for this.    2. Patient needs new order/preauthorization for omeprazole. Pharmacy said they could not refill anymore with out PA.    3. Patient does not believe that doxycycline is working for her anymore. She thinks she has developed tolerance to it. Is there an alternative she could take?    4. Lastly, patient has ibuprofen rx in home and naproxen. These are flagged as duplicate nsaids. However, patient not taking ibuprofen frequently, only with severe pain. Patient takes one naproxen daily for back pain/leg pain. Okay for her to continue this?    Thank you.

## 2020-04-30 ENCOUNTER — TELEPHONE (OUTPATIENT)
Dept: INTERNAL MEDICINE | Facility: CLINIC | Age: 50
End: 2020-04-30

## 2020-04-30 ENCOUNTER — VIRTUAL VISIT (OUTPATIENT)
Dept: INTERNAL MEDICINE | Facility: CLINIC | Age: 50
End: 2020-04-30
Payer: COMMERCIAL

## 2020-04-30 DIAGNOSIS — H10.33 ACUTE CONJUNCTIVITIS OF BOTH EYES, UNSPECIFIED ACUTE CONJUNCTIVITIS TYPE: Primary | ICD-10-CM

## 2020-04-30 DIAGNOSIS — K21.00 GASTROESOPHAGEAL REFLUX DISEASE WITH ESOPHAGITIS: ICD-10-CM

## 2020-04-30 DIAGNOSIS — L73.2 HIDRADENITIS SUPPURATIVA: ICD-10-CM

## 2020-04-30 PROCEDURE — 99214 OFFICE O/P EST MOD 30 MIN: CPT | Mod: TEL | Performed by: INTERNAL MEDICINE

## 2020-04-30 RX ORDER — ERYTHROMYCIN 5 MG/G
0.5 OINTMENT OPHTHALMIC 3 TIMES DAILY
Qty: 3 TUBE | Refills: 1 | Status: SHIPPED | OUTPATIENT
Start: 2020-04-30 | End: 2020-06-19

## 2020-04-30 RX ORDER — OMEPRAZOLE 40 MG/1
40 CAPSULE, DELAYED RELEASE ORAL DAILY
Qty: 90 CAPSULE | Refills: 1 | Status: SHIPPED | OUTPATIENT
Start: 2020-04-30 | End: 2020-06-19

## 2020-04-30 NOTE — PROGRESS NOTES
"Swetha Patterson is a 50 year old female who is being evaluated via a billable telephone visit.      The patient has been notified of following:     \"This telephone visit will be conducted via a call between you and your physician/provider. We have found that certain health care needs can be provided without the need for a physical exam.  This service lets us provide the care you need with a short phone conversation.  If a prescription is necessary we can send it directly to your pharmacy.  If lab work is needed we can place an order for that and you can then stop by our lab to have the test done at a later time.    Telephone visits are billed at different rates depending on your insurance coverage. During this emergency period, for some insurers they may be billed the same as an in-person visit.  Please reach out to your insurance provider with any questions.    If during the course of the call the physician/provider feels a telephone visit is not appropriate, you will not be charged for this service.\"    Patient has given verbal consent for Telephone visit?  Yes    How would you like to obtain your AVS? [facility]       This is a VIDEO encounter with the patient.       15:35 --- 15:56          Dr Low's note      Patient's instructions / PLAN:                                                        Plan:  1. Erythromycin ointment 3 times a day to both eys   2. Dermato referral  [facility]: [facility] [phone]    3. No changes in the other meds   4. Virtual visit next week when the visiting RN is visiting her       For info about how to use My Chart: call   [facility] Help Line [phone]   [facility] Log-on Page: [email or web address]  MyChart [facility] Page: [email or web address]     ASSESSMENT & PLAN:                                                      (H10.33) Acute conjunctivitis of both eyes, unspecified acute conjunctivitis type  (primary encounter diagnosis)  Comment: not cleared with " occuflox   Plan: erythromycin (ROMYCIN) 5 MG/GM ophthalmic         ointment            (L73.2) Hidradenitis suppurativa - severe not responsive to meds   Comment:   Plan: DERMATOLOGY REFERRAL            (K21.0) Gastroesophageal reflux disease with esophagitis  Comment: better  Plan: omeprazole (PRILOSEC) 40 MG DR capsule             Chief complaint:                                                      Eyes   Skin     SUBJECTIVE:                                                    History of present illness:        Swetha Patterson is a 50 year old female who presents to clinic today for the following health issues: Patient states medications are not helping her eyes and rash.      Red eyes  -- Ofloxacin didn't help   -- she has crusts     Skin   -- hydradenitis on chr Doxy  -- she has been getting more and more lumps, painful that open up   -- a recent one under the breast    GERD  -- takes Omeprazole 20 bid  -- needs PA  -- agrees to take 40 mg daily     Hyperlipidemia Follow-Up      Are you regularly taking any medication or supplement to lower your cholesterol?   Yes- lipitor    Are you having muscle aches or other side effects that you think could be caused by your cholesterol lowering medication?  No    Hypertension Follow-up      Do you check your blood pressure regularly outside of the clinic? Yes     Are you following a low salt diet? Yes    Are your blood pressures ever more than 140 on the top number (systolic) OR more   than 90 on the bottom number (diastolic), for example 140/90? No      How many servings of fruits and vegetables do you eat daily?  0-1    On average, how many sweetened beverages do you drink each day (Examples: soda, juice, sweet tea, etc.  Do NOT count diet or artificially sweetened beverages)?   1    How many days per week do you exercise enough to make your heart beat faster? 3 or less    How many minutes a day do you exercise enough to make your heart beat faster? 20 - 29    How many  "days per week do you miss taking your medication? 0         Review of Systems:                                                      ROS: negative for fever, chills, cough, wheezes, chest pain, shortness of breath, vomiting, abdominal pain, leg swelling Pos for chr cough and SOB      OBJECTIVE:           An actual physical exam can't be done during phone visit   A limited exam can sometimes be performed by video visit   Very poor video connection - I couldn't see the \" lumps\"     PMHx: reviewed  Past Medical History:   Diagnosis Date     Anxiety state, unspecified      Asthma      Chronic pain     back pain from cyst     Contact dermatitis and other eczema, due to unspecified cause      COPD (chronic obstructive pulmonary disease) (H)      Depressive disorder, not elsewhere classified      Emphysema with chronic bronchitis (H)      Esophageal reflux      Family history of ischemic heart disease      Gastro-oesophageal reflux disease      History of emphysema      Hoarseness      HTN, goal below 140/90      Hyperlipidemia LDL goal <130      Liver disease     \"fatty liver\"     Other chronic pain     chest, from coughing     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)      PONV (postoperative nausea and vomiting)       PSHx: reviewed  Past Surgical History:   Procedure Laterality Date     ARTHROSCOPY SHOULDER DECOMPRESSION Left 10/21/2015    Procedure: ARTHROSCOPY SHOULDER DECOMPRESSION;  Surgeon: Julien Milian MD;  Location: [facility] OR     BIOPSY ARTERY TEMPORAL Left 3/11/2020    Procedure: LEFT TEMPORAL ARTERY BIOPSY;  Surgeon: Ibeth Conner MD;  Location: [facility] OR     BRONCHIAL THERMOPLASTY N/A 11/14/2014    Procedure: BRONCHIAL THERMOPLASTY;  Surgeon: Ward Whitaker MD;  [facility]: [facility]     BRONCHIAL THERMOPLASTY N/A 12/19/2014    Procedure: BRONCHIAL THERMOPLASTY;  Surgeon: Ward Whitaker MD;  [facility]     BRONCHIAL THERMOPLASTY N/A 2/6/2015    Procedure: BRONCHIAL THERMOPLASTY;  Surgeon: " Ward Whitaker MD;  Location: [facility] OR     C APPENDECTOMY  at age 17     COLONOSCOPY N/A 11/26/2018    Procedure: COLONOSCOPY ([facility]);  Surgeon: Marshall Oakes MD;  Location: [facility] OR     DISCECTOMY, FUSION CERVICAL ANTERIOR ONE LEVEL, COMBINED N/A 5/1/2018    Procedure: COMBINED DISCECTOMY, FUSION CERVICAL ANTERIOR ONE LEVEL;  1.  C5-C6 anterior cervical diskectomy and fusion.    2.  C5-C6 application of intervertebral biomechanical device for interbody fusion purposes.    3.  C5-C6 anterior instrumentation using the standard Kristin InViZia 24 mm plate with four associated bone screws, 12 mm in length.  Inferior screws are fixed.  Superior screws are va     ENT SURGERY  2015    polyps removed from vocal cords      EXCISE NODE MEDIASTINAL  4/26/2013    Procedure: EXCISE NODE MEDIASTINAL;;  Surgeon: Av Peña MD;  Location:  OR     LAPAROSCOPIC CHOLECYSTECTOMY N/A 10/19/2017    Procedure: LAPAROSCOPIC CHOLECYSTECTOMY;  LAPAROSCOPIC CHOLECYSTECTOMY;  Surgeon: Ney Jerry MD;  Location:  OR     THORACOSCOPY  4/26/2013    Procedure: THORACOSCOPY;  LEFT VIDEO ASSISTED THORACOSCOPY, RESECTION OF POSTERIOR MEDIASTINAL MASS;  Surgeon: Av Peña MD;  Location:  OR     TONSILLECTOMY  as a kid     TONSILLECTOMY          Meds: reviewed  Current Outpatient Medications   Medication Sig Dispense Refill     acetaminophen (TYLENOL) 325 MG tablet Take 3 tablets (975 mg) by mouth every 6 hours as needed for pain 50 tablet 0     albuterol (PROAIR HFA/PROVENTIL HFA/VENTOLIN HFA) 108 (90 Base) MCG/ACT inhaler Inhale 2 puffs into the lungs every 6 hours as needed for shortness of breath / dyspnea 18 g 3     albuterol (PROVENTIL) (2.5 MG/3ML) 0.083% neb solution Take 1 vial (2.5 mg) by nebulization every 6 hours as needed for shortness of breath / dyspnea or wheezing 25 vial 3     amLODIPine (NORVASC) 5 MG tablet Take 1 tablet (5 mg) by mouth daily 90 tablet 0     atorvastatin (LIPITOR) 80  MG tablet Take 1 tablet (80 mg) by mouth daily 90 tablet 1     benzoyl peroxide 10 % EX external gel Apply topically 2 times daily 90 g 3     budesonide-formoterol (SYMBICORT) 160-4.5 MCG/ACT IN Inhaler Inhale 2 puffs into the lungs 2 times daily 3 Inhaler 1     buPROPion 150 MG PO 24 hr tablet Take 1 tablet (150 mg) by mouth every morning 90 tablet 1     cetirizine HCl 10 MG CAPS Take 1 capsule (10 mg) by mouth daily as needed Takes in the spring. 90 capsule 3     clindamycin 1 % EX external gel Apply topically 2 times daily 60 g 3     desonide 0.05 % EX external cream Apply topically as needed (rash) 60 g 0     doxycycline hyclate (VIBRA-TABS) 100 MG tablet Take 1 tablet (100 mg) by mouth 2 times daily 180 tablet 0     fluticasone 50 MCG/ACT NA nasal spray Spray 2 sprays in nostril daily 16 g 5     furosemide (LASIX) 20 MG tablet Take 1 tablet (20 mg) by mouth daily 90 tablet 1     hydrOXYzine (ATARAX) 50 MG tablet Take 2 tablets (100 mg) by mouth 3 times daily 180 tablet 3     ibuprofen (ADVIL/MOTRIN) 600 MG tablet Take 1 tablet (600 mg) by mouth every 6 hours as needed for pain 30 tablet 0     lisinopril (PRINIVIL/ZESTRIL) 20 MG tablet Take 1 tablet (20 mg) by mouth daily 90 tablet 1     metFORMIN (GLUCOPHAGE) 500 MG tablet Take 1 tablet (500 mg) by mouth daily (with breakfast) 30 tablet 3     metroNIDAZOLE (METROGEL) 0.75 % external gel Apply topically 2 times daily 45 g 1     montelukast (SINGULAIR) 10 MG tablet TAKE ONE TABLET BY MOUTH AT BEDTIME 90 tablet 0     nicotine (EQ NICOTINE POLACRILEX) 2 MG lozenge Place 1 lozenge (2 mg) inside cheek every hour as needed for smoking cessation 360 lozenge 1     nicotine (NICORETTE) 2 MG gum Place 1 each (2 mg) inside cheek as needed for smoking cessation 150 each 1     nicotine 14 MG/24HR TD 24 hr patch Place 1 patch onto the skin every 24 hours 30 patch 1     nitroGLYcerin (NITROSTAT) 0.4 MG sublingual tablet FOR CHEST PAIN PLACE ONE TABLET UNDER THE TONGUE EVERY 5  MINUTES FOR 3 DOSES.  IF SYPTOMS PERSIST 5 MINUTES AFTER 1ST DOSE CALL 911 25 tablet 0     nystatin 859711 UNIT/GM EX external powder Apply topically 2 times daily as needed (skin rash) 60 g 11     omeprazole 20 MG tablet Take 1 tablet (20 mg) by mouth 2 times daily 180 tablet 1     order for DME Equipment being ordered: Digital home blood pressure monitor kit 1 Device 0     order for DME Equipment being ordered: Pulse Oxymeter 1 Device 0     senna-docusate (SENOKOT-S/PERICOLACE) 8.6-50 MG tablet Take 1-2 tablets by mouth 2 times daily Use while taking narcotic pain medication to prevent constipation. 10 tablet 0     tiotropium (SPIRIVA HANDIHALER) 18 MCG IN inhaled capsule Inhale contents of one capsule daily. 90 capsule 3     varenicline (CHANTIX) 1 MG tablet Take 1 tablet (1 mg) by mouth 2 times daily 60 tablet 3     fluconazole (DIFLUCAN) 150 MG tablet Take 1 tablet and repeat another tablet in 3 days if needed (Patient not taking: Reported on 4/30/2020) 2 tablet 0     ofloxacin (OCUFLOX) 0.3 % ophthalmic solution Place 1-2 drops into both eyes 4 times daily (Patient not taking: Reported on 4/30/2020) 10 mL 0       Soc Hx: reviewed  Fam Hx: reviewed          Roro Low MD  Internal Medicine

## 2020-05-01 ENCOUNTER — TELEPHONE (OUTPATIENT)
Dept: INTERNAL MEDICINE | Facility: CLINIC | Age: 50
End: 2020-05-01

## 2020-05-01 NOTE — TELEPHONE ENCOUNTER
.Prior Authorization Retail Medication Request    Medication/Dose: Omeprazole  ICD code (if different than what is on RX):  unknown  Previously Tried and Failed:  unknown  Rationale:  unknown    Insurance Name:  unknown  Insurance ID:  unknown      Pharmacy Information (if different than what is on RX)  Name:  unknown  Phone:  unknown

## 2020-05-01 NOTE — TELEPHONE ENCOUNTER
Prior Authorization Not Needed per Insurance    Medication: omeprazole--NO PA NEEDED  Insurance Company: [employer] - Phone [phone] Fax [phone]  Expected CoPay:      Pharm[facility]illing the Rx[facility] [place] [place] - [address]  Pharmacy Notified: Yes  Patient Notified: Yes **Instructed pharmacy to notify patient when script is ready to /ship.**

## 2020-05-04 NOTE — TELEPHONE ENCOUNTER
FUTURE VISIT INFORMATION      FUTURE VISIT INFORMATION:    Date: 7.2.2020    Time: 1:00 PM    Location: [facility]  REFERRAL INFORMATION:    Referring provider:  Dr. Chawla    Referring providers clinic:      Reason for visit/diagnosis  Hidradenitis suppurativa    RECORDS REQUESTED FROM:       Clinic name Comments Records Status Imaging Status   [facility] 4.30.20 Dr. Lopez, referral and virtual visit  5.2.19 Dr. Lopez, office visit [facility]

## 2020-05-07 ENCOUNTER — TELEPHONE (OUTPATIENT)
Dept: INTERNAL MEDICINE | Facility: CLINIC | Age: 50
End: 2020-05-07

## 2020-05-07 NOTE — TELEPHONE ENCOUNTER
..[facility] now requests orders and shares plan of care/discharge summaries for some patients through [employer].  Please REPLY TO THIS MESSAGE OR ROUTE BACK TO THE AUTHOR in order to give authorization for orders when needed.  This is considered a verbal order, you will still receive a faxed copy of orders for signature.  Thank you for your assistance in improving collaboration for our patients.    ORDER  Can we get an order for hibiclens daily to skin folds to decrease bacteria . Pt has no open areas currently from hidradenitis

## 2020-05-11 ENCOUNTER — TELEPHONE (OUTPATIENT)
Dept: INTERNAL MEDICINE | Facility: CLINIC | Age: 50
End: 2020-05-11

## 2020-05-12 ENCOUNTER — TELEPHONE (OUTPATIENT)
Dept: INTERNAL MEDICINE | Facility: CLINIC | Age: 50
End: 2020-05-12

## 2020-05-12 NOTE — TELEPHONE ENCOUNTER
Patient is calling to ask for a rx for panic attacks. She says they found her 31 year old nephew dead yesterday and they think he may have been murdered.

## 2020-05-12 NOTE — TELEPHONE ENCOUNTER
Received a letter from [email or web address] stating Cherelle Barreto is the CC for this patient. hCerelle can be reached at [phone].

## 2020-05-13 NOTE — TELEPHONE ENCOUNTER
"Patient called back, gave her message from provider. Patient states she can't wait to see psychiatry to get new medication and \"it will be on Dr Low if something happens to her\". Call disconnected before I transfer patient to RN.   "

## 2020-05-13 NOTE — TELEPHONE ENCOUNTER
Left message on home/cell voicemail asking patient to return call to clinic.  SERJIO Chandra R.N.

## 2020-05-15 ENCOUNTER — TELEPHONE (OUTPATIENT)
Dept: CARE COORDINATION | Facility: CLINIC | Age: 50
End: 2020-05-15

## 2020-05-15 NOTE — TELEPHONE ENCOUNTER
Patient called this writer, audibly crying over the phone. patient stated that her nephew  this week and she does not want any visit this week for medication set up/general assessment. this writer said he understood that no visit was requested this week. he asked how patient would get her meds refilled and she said she doesn't care because she isn't taking them right now. patient said she will call this writer when she is back home. she is going to stay with her grieving sister at her house. this writer offered condolences and prayers to patient and her family.

## 2020-05-18 ENCOUNTER — VIRTUAL VISIT (OUTPATIENT)
Dept: BEHAVIORAL HEALTH | Facility: CLINIC | Age: 50
End: 2020-05-18
Payer: COMMERCIAL

## 2020-05-18 ENCOUNTER — HOSPITAL ENCOUNTER (EMERGENCY)
Facility: CLINIC | Age: 50
Discharge: HOME | End: 2020-05-18
Attending: EMERGENCY MEDICINE | Admitting: EMERGENCY MEDICINE
Payer: COMMERCIAL

## 2020-05-18 VITALS
RESPIRATION RATE: 16 BRPM | DIASTOLIC BLOOD PRESSURE: 79 MMHG | OXYGEN SATURATION: 100 % | SYSTOLIC BLOOD PRESSURE: 114 MMHG | HEART RATE: 94 BPM

## 2020-05-18 DIAGNOSIS — F43.20 EMOTIONAL CRISIS: Primary | ICD-10-CM

## 2020-05-18 DIAGNOSIS — F41.9 ANXIETY: ICD-10-CM

## 2020-05-18 DIAGNOSIS — F10.920 ALCOHOLIC INTOXICATION WITHOUT COMPLICATION (H): ICD-10-CM

## 2020-05-18 LAB
ALCOHOL BREATH TEST: 0.07 (ref 0–0.01)
AMPHETAMINES UR QL SCN: NEGATIVE
ANION GAP SERPL CALCULATED.3IONS-SCNC: 8 MMOL/L (ref 3–14)
BARBITURATES UR QL: NEGATIVE
BASOPHILS # BLD AUTO: 0.1 10E9/L (ref 0–0.2)
BASOPHILS NFR BLD AUTO: 0.4 %
BENZODIAZ UR QL: NEGATIVE
BUN SERPL-MCNC: 15 MG/DL (ref 7–30)
CALCIUM SERPL-MCNC: 8.6 MG/DL (ref 8.5–10.1)
CANNABINOIDS UR QL SCN: NEGATIVE
CHLORIDE SERPL-SCNC: 110 MMOL/L (ref 94–109)
CO2 SERPL-SCNC: 23 MMOL/L (ref 20–32)
COCAINE UR QL: NEGATIVE
CREAT SERPL-MCNC: 0.76 MG/DL (ref 0.52–1.04)
DIFFERENTIAL METHOD BLD: ABNORMAL
EOSINOPHIL # BLD AUTO: 0 10E9/L (ref 0–0.7)
EOSINOPHIL NFR BLD AUTO: 0 %
ERYTHROCYTE [DISTWIDTH] IN BLOOD BY AUTOMATED COUNT: 15.1 % (ref 10–15)
ETHANOL SERPL-MCNC: 0.2 G/DL
GFR SERPL CREATININE-BSD FRML MDRD: >90 ML/MIN/{1.73_M2}
GLUCOSE SERPL-MCNC: 119 MG/DL (ref 70–99)
HCT VFR BLD AUTO: 44.3 % (ref 35–47)
HGB BLD-MCNC: 13.8 G/DL (ref 11.7–15.7)
IMM GRANULOCYTES # BLD: 0.3 10E9/L (ref 0–0.4)
IMM GRANULOCYTES NFR BLD: 2.3 %
LYMPHOCYTES # BLD AUTO: 3.2 10E9/L (ref 0.8–5.3)
LYMPHOCYTES NFR BLD AUTO: 28.1 %
MCH RBC QN AUTO: 27.5 PG (ref 26.5–33)
MCHC RBC AUTO-ENTMCNC: 31.2 G/DL (ref 31.5–36.5)
MCV RBC AUTO: 88 FL (ref 78–100)
MONOCYTES # BLD AUTO: 0.5 10E9/L (ref 0–1.3)
MONOCYTES NFR BLD AUTO: 4.8 %
NEUTROPHILS # BLD AUTO: 7.2 10E9/L (ref 1.6–8.3)
NEUTROPHILS NFR BLD AUTO: 64.4 %
NRBC # BLD AUTO: 0 10*3/UL
NRBC BLD AUTO-RTO: 0 /100
OPIATES UR QL SCN: NEGATIVE
PCP UR QL SCN: NEGATIVE
PLATELET # BLD AUTO: 403 10E9/L (ref 150–450)
POTASSIUM SERPL-SCNC: 4 MMOL/L (ref 3.4–5.3)
RBC # BLD AUTO: 5.02 10E12/L (ref 3.8–5.2)
SODIUM SERPL-SCNC: 141 MMOL/L (ref 133–144)
WBC # BLD AUTO: 11.2 10E9/L (ref 4–11)

## 2020-05-18 PROCEDURE — 99285 EMERGENCY DEPT VISIT HI MDM: CPT | Mod: 25

## 2020-05-18 PROCEDURE — 80048 BASIC METABOLIC PNL TOTAL CA: CPT | Performed by: EMERGENCY MEDICINE

## 2020-05-18 PROCEDURE — 80307 DRUG TEST PRSMV CHEM ANLYZR: CPT | Performed by: EMERGENCY MEDICINE

## 2020-05-18 PROCEDURE — 82075 ASSAY OF BREATH ETHANOL: CPT

## 2020-05-18 PROCEDURE — 80320 DRUG SCREEN QUANTALCOHOLS: CPT | Performed by: EMERGENCY MEDICINE

## 2020-05-18 PROCEDURE — 96372 THER/PROPH/DIAG INJ SC/IM: CPT

## 2020-05-18 PROCEDURE — 90791 PSYCH DIAGNOSTIC EVALUATION: CPT

## 2020-05-18 PROCEDURE — 25000128 H RX IP 250 OP 636: Performed by: EMERGENCY MEDICINE

## 2020-05-18 PROCEDURE — 99207 ZZC NON-BILLABLE SERV PER CHARTING: CPT | Mod: 95 | Performed by: COUNSELOR

## 2020-05-18 PROCEDURE — 85025 COMPLETE CBC W/AUTO DIFF WBC: CPT | Performed by: EMERGENCY MEDICINE

## 2020-05-18 PROCEDURE — 25000132 ZZH RX MED GY IP 250 OP 250 PS 637: Performed by: EMERGENCY MEDICINE

## 2020-05-18 RX ORDER — LORAZEPAM 0.5 MG/1
0.5 TABLET ORAL ONCE
Status: COMPLETED | OUTPATIENT
Start: 2020-05-18 | End: 2020-05-18

## 2020-05-18 RX ORDER — OLANZAPINE 10 MG/2ML
10 INJECTION, POWDER, FOR SOLUTION INTRAMUSCULAR DAILY PRN
Status: DISCONTINUED | OUTPATIENT
Start: 2020-05-18 | End: 2020-05-19 | Stop reason: HOSPADM

## 2020-05-18 RX ADMIN — LORAZEPAM 0.5 MG: 0.5 TABLET ORAL at 20:07

## 2020-05-18 RX ADMIN — OLANZAPINE 10 MG: 10 INJECTION, POWDER, FOR SOLUTION INTRAMUSCULAR at 17:36

## 2020-05-18 NOTE — LETTER
5/18/2020        RE: Swetha Patterson  [address] [place] [address]          5/18/2020 2:30 pm- 2:45pm phone call     Provider called client to do schedule diagnostic assessment and client reported she was in emotional distress and unable to do an assessment. Clients speech was pressured and she was audibly crying. Client denied any suicidal ideation or homicidal ideation. Provider gave client as ER as resource for crisis. Client reported she felt she needed additional medications. Provider made it known I am unable to provide any medications. Client chose to not do diagnostic assessment as she stated she felt too dysregulated.     Provider called client back and left voicemail with [facility] Crisis number as follows:   [phone]            Sincerely,        Raisa Ramirez, [facility]

## 2020-05-18 NOTE — ED AVS SNAPSHOT
[facility] Emergency Department  [address]  [facility] [address]  Phone:  [phone]  Fax:  [phone]                                    Swetha Patterson   MRN: [id number]    Department:  [facility] Emergency Department   Date of Visit:  5/18/2020           After Visit Summary Signature Page    I have received my discharge instructions, and my questions have been answered. I have discussed any challenges I see with this plan with the nurse or doctor.    ..........................................................................................................................................  Patient/Patient Representative Signature      ..........................................................................................................................................  Patient Representative Print Name and Relationship to Patient    ..................................................               ................................................  Date                                   Time    ..........................................................................................................................................  Reviewed by Signature/Title    ...................................................              ..............................................  Date                                               Time          22[facility] Rev 08/18

## 2020-05-18 NOTE — ED NOTES
Bed: ED04  Expected date: 5/18/20  Expected time: 5:09 PM  Means of arrival: Ambulance  Comments:  A518: 50F No sleep

## 2020-05-18 NOTE — PROGRESS NOTES
5/18/2020 2:30 pm- 2:45pm phone call     Provider called client to do schedule diagnostic assessment and client reported she was in emotional distress and unable to do an assessment. Clients speech was pressured and she was audibly crying. Client denied any suicidal ideation or homicidal ideation. Provider gave client as ER as resource for crisis. Client reported she felt she needed additional medications. Provider made it known I am unable to provide any medications. Client chose to not do diagnostic assessment as she stated she felt too dysregulated.     Provider called client back and left voicemail with [facility] Crisis number as follows:   [phone]

## 2020-05-18 NOTE — ED TRIAGE NOTES
"Arrives via EMS after PD were called by pt's care manager after speaking with pt who was anxious and excitable at the scene. Per report pt has hx anxiety and depression and hasn't been taking her \"13 pills\" since the 10th of May when her nephew passed away from a drug overdose. Presents standing, shouting and refusing to enter ED rm 4.   "

## 2020-05-18 NOTE — ED PROVIDER NOTES
History     Chief Complaint:  Anxiety       HPI   Swetha Patterson is a 50 year old female with a history of COPD and depression who presents via EMS for evaluation of decreased sleep.  Patient arrives from home and when she arrived to the ER she was screaming.  She was unconsolable and difficulty getting history out.  She was crying at times.  She has a history of anxiety depression has not been taking her medications for past few weeks.  Her nephew recently passed away from drug overdose.  Limited history secondary to patient's mental health at this time.    Allergies:  Codeine   Cyclobenzaprine   Hydrocodone  Sulfa drugs   Gabapentin      Medications:    Tylenol   Albuterol inhaler   Albuterol nebulizer solution  Amlodipine  Lipitor   Symbicort inhaler  Bupropion   Cetirizine Hcl   Doxycycline hyclate   Fluconazole  Fluticasone  Lasix   Hydroxyzine   Ibuprofen   Lisinopril  Metformin  Singulair   Nitroglycerin  Omeprazole   Senna-docusate   Spiriva handihaler   Chantix      Past Medical History:    Asthma  Chronic pain  COPD  Opioid type dependence   Depression  Emphysema with chronic bronchitis  Esophageal reflux  GERD   Hypertension   Peripheral artery disease   Hyperlipidemia  Liver disease    Polyp of vocal cord or larynx     Past Surgical History:    Arthroscopy shoulder decompression, left   Biopsy artery temporal, left   Bronchial thermoplasty   Appendectomy   Colonoscopy  Discectomy, fusion cervical anterior one level, combined   ENT surgery, polyps removed from vocal cords   Excise node mediastinal  Laparoscopic cholecystectomy   Thoracoscopy   Tonsillectomy     Family History:    Heart disease - Mother   Hypertension - Mother and sister   Cerebrovascular disease - Mother and sister   Depression - Mother   Gallbladder disease - Mother   Asthma - Mother and sister     Social History:  Tobacco use:    Light tobacco smoker - 2 cigarettes / day   Alcohol use:    Negative   Drug use:    Negative   Marital  status:       Accompanied to ED by:  EMS      Review of Systems   Unable to perform ROS: Psychiatric disorder   Psychiatric/Behavioral: Positive for agitation. The patient is nervous/anxious.      Physical Exam   First Vitals:  BP: (!) 147/95  Pulse: 101  SpO2: 100 %      Physical Exam  General: Screaming in room   Head:  The scalp, face, and head appear normal  Eyes:  Conjunctivae are normal  ENT:    The nose is normal    Pinnae are normal    External acoustic canals are normal  Neck:  Trachea midline  CV:  Pulses are normal.    Resp:  No respiratory distress   Abdomen:      Soft, non-tender, non-distended  Musc:  Normal muscular tone    No major joint effusions    No asymmetric leg swelling  Skin:  No rash or lesions noted  Neuro:  Speech is normal and fluent. Face is symmetric.     Moving all extremities well.   Psych: Awake. Alert.  Normal affect.  Appropriate interactions.     Emergency Department Course     Laboratory:  CBC: WBC 11.2 high, o/w WNL (HGB 13.8, )   BMP: Chloride 110 high, Glucose 119 high, o/w WNL (Creatinine 0.76)   Alcohol ethyl 1923: 0.20 high   Alcohol breath test POCT 2211: 0.068   Drug abuse screen 77 urine: Negative     Interventions:  1736 Zyprexa 10 mg IM   2007 Ativan 0.5 mg PO     Emergency Department Course:  Patient was brought to the ED via EMS.     Nursing notes and vitals reviewed.  1725: I performed an exam of the patient as documented above.     1727: Code 21.     1755: I reassessed the patient.     1936: I reassessed the patient.      DEC met with the patient.  Findings were discussed; please see note for details.     I personally reviewed the laboratory results with the patient and answered all related questions prior to discharge.     Findings and plan explained to the Patient. Patient discharged home with instructions regarding supportive care, medications, and reasons to return. The importance of close follow-up was reviewed.      Impression & Plan       Medical Decision Making:  Swetha Patterson is a 49 yo F who presents with anxiety and decreased sleep. When patient arrived, she was screaming and became so agitated that a code 21 was called.  10 mg of IM Zyprexa was given.  Patient was placed in restraints.  She was instructed to try to calm down in order to get out of restraints which she did.  At this time alcohol was felt on her so I suspect this is the etiology of her outburst.  Her blood alcohol level was elevated.  We are able to get her out of restraints and she was quite apologetic.  After some time of rest she admits to high anxiety and stress.  We did have DEC evaluate the patient when she was sober.  They feel she is safe for discharge which I agree.  Patient will see a therapist soon.  Patient discharged.  Diagnosis:    ICD-10-CM    1. Alcoholic intoxication without complication (H)  F10.920    2. Anxiety  F41.9        Disposition:  Discharged to home.         I, Billy Le, am serving as a scribe at 5:25 PM on 5/18/2020 to document services personally performed by Dr. Luana Danielle, based on my observations and the provider's statements to me.    [facility] EMERGENCY DEPARTMENT       Luana Danielle MD  05/20/20 [id number]

## 2020-05-19 NOTE — ED NOTES
"Pt tearful, anxious, shaking. Requesting something additional for anxiety. Talking about family. States she moved in with her 79yo father a year ago after losing her  to bone cancer. States he is ok at home. She is talking about her sister and her nephew who  on Mothers day of a drug overdose and how he meant so much to her and she is worried about his six year old son and her sister.   Able to calm a bit with deep breathing. Denies feeling suicidal, states \"Im falling apart\". Hasn't slept or ate in three days.   "

## 2020-05-19 NOTE — DISCHARGE INSTRUCTIONS
Discharge Instructions  Mental Health Concerns    You were seen today for mental health concerns, such as depression, anxiety, or suicidal thinking. Your provider feels that you do not require hospitalization at this time. However, your symptoms may become worse, and you may need to return to the Emergency Department. Most treatments of depression and suicidal thoughts are a process rather than a single intervention.  Medications and counseling can take several weeks or more to help.    Generally, every Emergency Department visit should have a follow-up clinic visit with either a primary or a specialty clinic/provider. Please follow-up as instructed by your emergency provider today.    By accepting these discharge instructions:  You promise to not harm yourself or others.  You agree that if you feel you are becoming unable to keep that promise, you will do something to help yourself before you do anything to harm yourself or others.   You agree to keep any safety plan arranged on your visit here today.  You agree to take any medication prescribed or recommended by your provider.  If you are getting worse, you can contact a friend or a family member, contact your counselor or family provider, contact a crisis line, or other options discussed with the provider or therapist today.  At any time, you can call 911 and return to the Emergency Department for more help.  You understand that follow-up is essential to your treatment, and you will make and keep appointments recommended on your visit today.    How to improve your mental health and prevent suicide:  Involve others by letting family, friends, counselors know.  Do not isolate yourself.  Avoid alcohol or drugs. Remove weapons, poisons from your home.  Try to stick to routines for eating, sleeping and getting regular exercise.    Try to get into sunlight. Bright natural light not only treats seasonal affective disorder but also depression.  Increase safe activities  that you enjoy.    If you feel worse, contact 1-800-suicide (1-175.271.5649), or call 911, or your primary provider/counselor for additional assistance.    If you were given a prescription for medicine here today, be sure to read all of the information (including the package insert) that comes with your prescription.  This will include important information about the medicine, its side effects, and any warnings that you need to know about.  The pharmacist who fills the prescription can provide more information and answer questions you may have about the medicine.  If you have questions or concerns that the pharmacist cannot address, please call or return to the Emergency Department.   Remember that you can always come back to the Emergency Department if you are not able to see your regular provider in the amount of time listed above, if you get any new symptoms, or if there is anything that worries you.

## 2020-05-20 ENCOUNTER — TELEPHONE (OUTPATIENT)
Dept: INTERNAL MEDICINE | Facility: CLINIC | Age: 50
End: 2020-05-20

## 2020-05-20 DIAGNOSIS — R73.03 PREDIABETES: Primary | ICD-10-CM

## 2020-05-20 ASSESSMENT — ENCOUNTER SYMPTOMS
AGITATION: 1
NERVOUS/ANXIOUS: 1

## 2020-05-20 NOTE — TELEPHONE ENCOUNTER
Diabetes is not listed on patient's problem list. Call to patient, she states she is prediabetic, and states her home care nurse advised she should be checking her blood sugar.     Primary care provider, please advise if patient should have a prescription sent in for meter and supplies. If so, please advise how often patient should be checking blood sugars.

## 2020-05-21 ENCOUNTER — TELEPHONE (OUTPATIENT)
Dept: INTERNAL MEDICINE | Facility: CLINIC | Age: 50
End: 2020-05-21

## 2020-05-26 ENCOUNTER — TELEPHONE (OUTPATIENT)
Dept: INTERNAL MEDICINE | Facility: CLINIC | Age: 50
End: 2020-05-26

## 2020-05-26 DIAGNOSIS — R19.7 DIARRHEA, UNSPECIFIED TYPE: Primary | ICD-10-CM

## 2020-05-26 NOTE — TELEPHONE ENCOUNTER
Called patient and advised of message below. Patient verbalized understanding, states she will check with insurance and then call us back.

## 2020-05-26 NOTE — TELEPHONE ENCOUNTER
She has prediabetes  She needs to check with insurance: If insurance covers we can send the Rx for supplies   At this time I see no urgent need to check the BS

## 2020-05-26 NOTE — TELEPHONE ENCOUNTER
Patient calls concerned that she may have gotten a round worm from her cat.    Patient reports she got a cat in February and was unable to get her vaccinated due to Covid-19. Patient reports her cat recently was throwing up worms, and was diagnosed to have round worms. Cat is currently being treated.    Patient concerned she may have gotten worms from her cat. Patient reports nausea, and loose stools x4 times daily over the last month. Patient also reports increased hunger and red bumps on her skin. Patient declined further triage, states she would like to hear from primary care provider if she needs to be treated. Writer recommended a virtual visit with primary care provider, patient declined, states she wants to hear from primary care provider first.     Primary care provider, please advise if patient should make a virtual visit.

## 2020-05-27 NOTE — TELEPHONE ENCOUNTER
Patient calling back. Insurance will cover accu check with supplies please send to pharmacy. [facility] in [place]

## 2020-05-29 ENCOUNTER — HOSPITAL ENCOUNTER (OUTPATIENT)
Dept: LAB | Facility: CLINIC | Age: 50
Discharge: HOME | End: 2020-05-29
Attending: INTERNAL MEDICINE | Admitting: INTERNAL MEDICINE
Payer: COMMERCIAL

## 2020-05-29 DIAGNOSIS — R19.7 DIARRHEA, UNSPECIFIED TYPE: ICD-10-CM

## 2020-05-29 PROCEDURE — 87177 OVA AND PARASITES SMEARS: CPT | Performed by: INTERNAL MEDICINE

## 2020-05-29 PROCEDURE — 87209 SMEAR COMPLEX STAIN: CPT | Performed by: INTERNAL MEDICINE

## 2020-06-01 ENCOUNTER — TELEPHONE (OUTPATIENT)
Dept: INTERNAL MEDICINE | Facility: CLINIC | Age: 50
End: 2020-06-01

## 2020-06-01 DIAGNOSIS — R73.9 ELEVATED BLOOD SUGAR LEVEL: Primary | ICD-10-CM

## 2020-06-01 NOTE — TELEPHONE ENCOUNTER
Patient has a meter and test stripes but no lancets were ever order. Please send rx to [facility] in [place]

## 2020-06-02 ENCOUNTER — TELEPHONE (OUTPATIENT)
Dept: INTERNAL MEDICINE | Facility: CLINIC | Age: 50
End: 2020-06-02

## 2020-06-02 NOTE — TELEPHONE ENCOUNTER
Rx for lancets sent to pharmacy for once a day testing with diagnosis of elevated blood sugar.  SERJIO Chandra R.N.

## 2020-06-02 NOTE — TELEPHONE ENCOUNTER
"Substance that she spit out looks like a \"gummy bear\", can roll it around in hand almost like a ball.  States she didn't cough but this substance just appeared in her mouth.  She generally has a non-productive cough, feels a little more short of breath than her base line over past week but may be from weather.  Feels nausea and fatigue, occasional headache for past month.  Denies any fever.  Scheduled for video visit.  SERJIO Chandra R.N.     "

## 2020-06-02 NOTE — TELEPHONE ENCOUNTER
Patient calls to report she coughed up a clear gel that feels like jelly yesterday. She kept it in a container in the refrigerator. She has also had some nausea. Call back to advise at [phone] (home)

## 2020-06-03 ENCOUNTER — VIRTUAL VISIT (OUTPATIENT)
Dept: INTERNAL MEDICINE | Facility: CLINIC | Age: 50
End: 2020-06-03
Payer: COMMERCIAL

## 2020-06-03 DIAGNOSIS — R73.03 PREDIABETES: ICD-10-CM

## 2020-06-03 DIAGNOSIS — K21.9 GASTROESOPHAGEAL REFLUX DISEASE WITHOUT ESOPHAGITIS: Chronic | ICD-10-CM

## 2020-06-03 DIAGNOSIS — F41.9 ANXIETY: Chronic | ICD-10-CM

## 2020-06-03 DIAGNOSIS — E78.5 HYPERLIPIDEMIA LDL GOAL <130: Chronic | ICD-10-CM

## 2020-06-03 DIAGNOSIS — J44.9 COPD, MODERATE (H): ICD-10-CM

## 2020-06-03 DIAGNOSIS — R11.0 NAUSEA: Primary | ICD-10-CM

## 2020-06-03 DIAGNOSIS — I10 HTN, GOAL BELOW 140/90: ICD-10-CM

## 2020-06-03 DIAGNOSIS — R60.0 BILATERAL LEG EDEMA: ICD-10-CM

## 2020-06-03 PROCEDURE — 99214 OFFICE O/P EST MOD 30 MIN: CPT | Mod: GT | Performed by: INTERNAL MEDICINE

## 2020-06-03 RX ORDER — LISINOPRIL 20 MG/1
20 TABLET ORAL DAILY
Qty: 90 TABLET | Refills: 0 | Status: SHIPPED | OUTPATIENT
Start: 2020-06-03 | End: 2021-01-11

## 2020-06-03 RX ORDER — ONDANSETRON 8 MG/1
4-8 TABLET, FILM COATED ORAL EVERY 8 HOURS PRN
Qty: 30 TABLET | Refills: 1 | Status: SHIPPED | OUTPATIENT
Start: 2020-06-03 | End: 2020-08-28

## 2020-06-03 RX ORDER — ATORVASTATIN CALCIUM 80 MG/1
80 TABLET, FILM COATED ORAL DAILY
Qty: 90 TABLET | Refills: 0 | Status: SHIPPED | OUTPATIENT
Start: 2020-06-03 | End: 2021-02-12

## 2020-06-03 RX ORDER — FUROSEMIDE 20 MG
20 TABLET ORAL DAILY
Qty: 90 TABLET | Refills: 0 | Status: SHIPPED | OUTPATIENT
Start: 2020-06-03 | End: 2020-11-07

## 2020-06-03 RX ORDER — AMLODIPINE BESYLATE 5 MG/1
5 TABLET ORAL DAILY
Qty: 90 TABLET | Refills: 0 | Status: SHIPPED | OUTPATIENT
Start: 2020-06-03 | End: 2020-10-17

## 2020-06-03 NOTE — PROGRESS NOTES
"Swetha Patterson is a 50 year old female who is being evaluated via a billable video visit.      The patient has been notified of following:     \"This video visit will be conducted via a call between you and your physician/provider. We have found that certain health care needs can be provided without the need for an in-person physical exam.  This service lets us provide the care you need with a video conversation.  If a prescription is necessary we can send it directly to your pharmacy.  If lab work is needed we can place an order for that and you can then stop by our lab to have the test done at a later time.    Video visits are billed at different rates depending on your insurance coverage.  Please reach out to your insurance provider with any questions.    If during the course of the call the physician/provider feels a video visit is not appropriate, you will not be charged for this service.\"    Patient has given verbal consent for Video visit? Yes, Denisse Brown MA    How would you like to obtain your AVS? Mail a copy    Patient would like the video invitation sent by: Text to cell phone: [phone]    Will anyone else be joining your video visit? No          This is a VIDEO encounter with the patient.       12:23 --- 12:45       Location of the provider : office  Location of the patient : home              Dr Low's note      Patient's instructions / PLAN:                                                        Plan:  1. Change Metformin to 1/2 tablet with the breakfast and 1/2 tablet with dinner   2. Ondansetron 8 mg - take 1/2 - 1 tab 3 times a day as needed for nausea   3. Continue the other meds, same doses for now.      ASSESSMENT & PLAN:                                                      (R11.0) Nausea  (primary encounter diagnosis)  (K21.9) Gastroesophageal reflux disease without esophagitis  Comment:   Plan: ondansetron (ZOFRAN) 8 MG tablet        as above     (I10) HTN, goal below " 140/90  Comment: Controlled    Plan: amLODIPine (NORVASC) 5 MG tablet, lisinopril         (ZESTRIL) 20 MG tablet            (E78.5) Hyperlipidemia LDL goal <130  Comment: Controlled    Plan: atorvastatin (LIPITOR) 80 MG tablet            (R73.03) Prediabetes  Comment:   Plan: metFORMIN (GLUCOPHAGE) 500 MG tablet            (R60.0) Bilateral leg edema  Comment: Controlled    Plan: furosemide (LASIX) 20 MG tablet              (J44.9) COPD, moderate (H)  Comment: stable   Plan: Continue same meds, same doses for now     (F41.9) Anxiety  Comment: Not controlled   Plan: she has appointment with psychiatry in few days        Chief complaint:                                                         Nausea, fatigue   Cough   Follow up chronic medical problems      SUBJECTIVE:                                                    History of present illness:     Nausea  -- she denies alcohol since May 18 when she presented to ER with emotional crisis and alcohol intox  -- she has large hiatal hernia   -- she takes Omeprazole   -- nausea is worse after Metformin - she takes in the morning before she eats   -- she had some Zofran left at home - and it helped  -- she has a lot of reflux - it looked like a clear ball of jell         Anxiety  -- she has an appointment with psychiatrist at [facility] on June 5  -- she has once a week counseling     COPD  -- appointment in July   She has been feeling fatigue and nausea all the time for about a month. She has a cough-stuff coming up like petroleum jelly.      Review of Systems:                                                      ROS: negative for fever, chills,  wheezes, chest pain, shortness of breath, vomiting, abdominal pain, leg swelling pos for chr cough     A 10-point review of systems was obtained.  Those pertinent are above and in the in the Subjective section.  The rest of the systems are negative.           OBJECTIVE:           An actual physical exam can't be done  "during phone visit   A limited exam can sometimes be performed by video visit   NAD  Good eye contact. Speech with normal volume, pattern, tone. Thought process seems coherent, logical. Standard dressed appearance. Mood normal     PMHx: reviewed  Past Medical History:   Diagnosis Date     Anxiety state, unspecified      Asthma      Chronic pain     back pain from cyst     Contact dermatitis and other eczema, due to unspecified cause      COPD (chronic obstructive pulmonary disease) (H)      Depressive disorder, not elsewhere classified      Emphysema with chronic bronchitis (H)      Esophageal reflux      Family history of ischemic heart disease      Gastro-oesophageal reflux disease      History of emphysema      Hoarseness      HTN, goal below 140/90      Hyperlipidemia LDL goal <130      Liver disease     \"fatty liver\"     Other chronic pain     chest, from coughing     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)      PONV (postoperative nausea and vomiting)       PSHx: reviewed  Past Surgical History:   Procedure Laterality Date     ARTHROSCOPY SHOULDER DECOMPRESSION Left 10/21/2015    Procedure: ARTHROSCOPY SHOULDER DECOMPRESSION;  Surgeon: Julien Milian MD;  Location: [facility] OR     BIOPSY ARTERY TEMPORAL Left 3/11/2020    Procedure: LEFT TEMPORAL ARTERY BIOPSY;  Surgeon: Ibeth Conner MD;  Location: [facility] OR     BRONCHIAL THERMOPLASTY N/A 11/14/2014    Procedure: BRONCHIAL THERMOPLASTY;  Surgeon: Ward Whitaker MD;  Location: [facility]     BRONCHIAL THERMOPLASTY N/A 12/19/2014    Procedure: BRONCHIAL THERMOPLASTY;  Surgeon: Ward Whitaker MD;  Location: [facility]     BRONCHIAL THERMOPLASTY N/A 2/6/2015    Procedure: BRONCHIAL THERMOPLASTY;  Surgeon: Ward Whitaker MD;  Location: [facility]     C APPENDECTOMY  at age 17     COLONOSCOPY N/A 11/26/2018    Procedure: COLONOSCOPY ([facility]);  Surgeon: Marshall Oakes MD;  Location: [facility] OR     DISCECTOMY, FUSION CERVICAL ANTERIOR ONE LEVEL, COMBINED " N/A 5/1/2018    Procedure: COMBINED DISCECTOMY, FUSION CERVICAL ANTERIOR ONE LEVEL;  1.  C5-C6 anterior cervical diskectomy and fusion.    2.  C5-C6 application of intervertebral biomechanical device for interbody fusion purposes.    3.  C5-C6 anterior instrumentation using the standard Kristin InViZia 24 mm plate with four associated bone screws, 12 mm in length.  Inferior screws are fixed.  Superior screws are va     ENT SURGERY  2015    polyps removed from vocal cords      EXCISE NODE MEDIASTINAL  4/26/2013    Procedure: EXCISE NODE MEDIASTINAL;;  Surgeon: Av Peña MD;  Location:  OR     LAPAROSCOPIC CHOLECYSTECTOMY N/A 10/19/2017    Procedure: LAPAROSCOPIC CHOLECYSTECTOMY;  LAPAROSCOPIC CHOLECYSTECTOMY;  Surgeon: Ney Jerry MD;  Location:  OR     THORACOSCOPY  4/26/2013    Procedure: THORACOSCOPY;  LEFT VIDEO ASSISTED THORACOSCOPY, RESECTION OF POSTERIOR MEDIASTINAL MASS;  Surgeon: Av Peña MD;  Location:  OR     TONSILLECTOMY  as a kid     TONSILLECTOMY          Meds: reviewed  Current Outpatient Medications   Medication Sig Dispense Refill     acetaminophen (TYLENOL) 325 MG tablet Take 3 tablets (975 mg) by mouth every 6 hours as needed for pain 50 tablet 0     albuterol (PROAIR HFA/PROVENTIL HFA/VENTOLIN HFA) 108 (90 Base) MCG/ACT inhaler Inhale 2 puffs into the lungs every 6 hours as needed for shortness of breath / dyspnea 18 g 3     albuterol (PROVENTIL) (2.5 MG/3ML) 0.083% neb solution Take 1 vial (2.5 mg) by nebulization every 6 hours as needed for shortness of breath / dyspnea or wheezing 25 vial 3     amLODIPine (NORVASC) 5 MG tablet Take 1 tablet (5 mg) by mouth daily 90 tablet 0     atorvastatin (LIPITOR) 80 MG tablet Take 1 tablet (80 mg) by mouth daily 90 tablet 1     benzoyl peroxide 10 % EX external gel Apply topically 2 times daily 90 g 3     blood glucose (NO BRAND SPECIFIED) lancets standard Use to test blood sugar 1 time daily 100 each 1     blood  glucose (NO BRAND SPECIFIED) test strip Use to test blood sugar 1 times daily or as directed. 100 strip 0     blood glucose monitoring (NO BRAND SPECIFIED) meter device kit Use to test blood sugar 1 times daily or as directed. 1 kit 0     budesonide-formoterol (SYMBICORT) 160-4.5 MCG/ACT IN Inhaler Inhale 2 puffs into the lungs 2 times daily 3 Inhaler 1     buPROPion 150 MG PO 24 hr tablet Take 1 tablet (150 mg) by mouth every morning 90 tablet 1     cetirizine HCl 10 MG CAPS Take 1 capsule (10 mg) by mouth daily as needed Takes in the spring. 90 capsule 3     clindamycin 1 % EX external gel Apply topically 2 times daily 60 g 3     desonide 0.05 % EX external cream Apply topically as needed (rash) 60 g 0     doxycycline hyclate (VIBRA-TABS) 100 MG tablet Take 1 tablet (100 mg) by mouth 2 times daily 180 tablet 0     erythromycin (ROMYCIN) 5 MG/GM ophthalmic ointment Place 0.5 inches into both eyes 3 times daily 3 Tube 1     fluticasone 50 MCG/ACT NA nasal spray Spray 2 sprays in nostril daily 16 g 5     furosemide (LASIX) 20 MG tablet Take 1 tablet (20 mg) by mouth daily 90 tablet 1     hydrOXYzine (ATARAX) 50 MG tablet Take 2 tablets (100 mg) by mouth 3 times daily 180 tablet 3     ibuprofen (ADVIL/MOTRIN) 600 MG tablet Take 1 tablet (600 mg) by mouth every 6 hours as needed for pain 30 tablet 0     lisinopril (PRINIVIL/ZESTRIL) 20 MG tablet Take 1 tablet (20 mg) by mouth daily 90 tablet 1     metFORMIN (GLUCOPHAGE) 500 MG tablet Take 1 tablet (500 mg) by mouth daily (with breakfast) 30 tablet 3     metroNIDAZOLE (METROGEL) 0.75 % external gel Apply topically 2 times daily 45 g 1     montelukast (SINGULAIR) 10 MG tablet TAKE ONE TABLET BY MOUTH AT BEDTIME 90 tablet 0     nicotine (EQ NICOTINE POLACRILEX) 2 MG lozenge Place 1 lozenge (2 mg) inside cheek every hour as needed for smoking cessation 360 lozenge 1     nicotine (NICORETTE) 2 MG gum Place 1 each (2 mg) inside cheek as needed for smoking cessation 150 each  1     nicotine 14 MG/24HR TD 24 hr patch Place 1 patch onto the skin every 24 hours 30 patch 1     nitroGLYcerin (NITROSTAT) 0.4 MG sublingual tablet FOR CHEST PAIN PLACE ONE TABLET UNDER THE TONGUE EVERY 5 MINUTES FOR 3 DOSES.  IF SYPTOMS PERSIST 5 MINUTES AFTER 1ST DOSE CALL 911 25 tablet 0     nystatin 354651 UNIT/GM EX external powder Apply topically 2 times daily as needed (skin rash) 60 g 11     omeprazole (PRILOSEC) 40 MG DR capsule Take 1 capsule (40 mg) by mouth daily 90 capsule 1     omeprazole 20 MG tablet Take 1 tablet (20 mg) by mouth 2 times daily 180 tablet 1     order for DME Equipment being ordered: Digital home blood pressure monitor kit 1 Device 0     order for DME Equipment being ordered: Pulse Oxymeter 1 Device 0     senna-docusate (SENOKOT-S/PERICOLACE) 8.6-50 MG tablet Take 1-2 tablets by mouth 2 times daily Use while taking narcotic pain medication to prevent constipation. 10 tablet 0     tiotropium (SPIRIVA HANDIHALER) 18 MCG IN inhaled capsule Inhale contents of one capsule daily. 90 capsule 3     varenicline (CHANTIX) 1 MG tablet Take 1 tablet (1 mg) by mouth 2 times daily 60 tablet 3       Soc Hx: reviewed  Fam Hx: reviewed          Roro Low MD  Internal Medicine

## 2020-06-08 LAB
O+P STL MICRO: NORMAL
O+P STL MICRO: NORMAL
SPECIMEN SOURCE: NORMAL

## 2020-06-10 ENCOUNTER — TELEPHONE (OUTPATIENT)
Dept: WOUND CARE | Facility: CLINIC | Age: 50
End: 2020-06-10

## 2020-06-11 ENCOUNTER — TELEPHONE (OUTPATIENT)
Dept: CARE COORDINATION | Facility: CLINIC | Age: 50
End: 2020-06-11

## 2020-06-11 NOTE — TELEPHONE ENCOUNTER
[facility] now requests orders and shares plan of care/discharge summaries for some patients through [employer].  Please REPLY TO THIS MESSAGE OR ROUTE BACK TO THE AUTHOR in order to give authorization for orders when needed.  This is considered a verbal order, you will still receive a faxed copy of orders for signature.  Thank you for your assistance in improving collaboration for our patients.    ORDER    60 day recert to continue the following:    SN weekly, 3 PRN for med set up, disease mgmt, mental health support, safety assessments. No change in plan.     Thank you,     Patria EARLY

## 2020-06-15 ENCOUNTER — TELEPHONE (OUTPATIENT)
Dept: INTERNAL MEDICINE | Facility: CLINIC | Age: 50
End: 2020-06-15

## 2020-06-15 NOTE — TELEPHONE ENCOUNTER
She needs face to face visit with PCP for bath bench. Or else Dr Low can addend the 6/3/2020 VV to sign.    Renetta Heaton MD

## 2020-06-16 ENCOUNTER — TELEPHONE (OUTPATIENT)
Dept: INTERNAL MEDICINE | Facility: CLINIC | Age: 50
End: 2020-06-16

## 2020-06-16 NOTE — TELEPHONE ENCOUNTER
Patient called to report a rash (raised bumps) on her legs, arms and chest she has had for several weeks. She would like to know if Dr Low wants to see her in the clinic to evaluate. Call her back to advise at [phone] (home)

## 2020-06-17 NOTE — TELEPHONE ENCOUNTER
"SUBJECTIVE:   Margareth Green is a 65 year old female who presents for Preventive Visit.      The 10-year ASCVD risk score (Mikkikemar JASSO Jr., et al., 2013) is: 4.7%    Values used to calculate the score:      Age: 65 years      Sex: Female      Is Non- : No      Diabetic: No      Tobacco smoker: No      Systolic Blood Pressure: 126 mmHg      Is BP treated: No      HDL Cholesterol: 75 mg/dL      Total Cholesterol: 151 mg/dL  Patient is eligible for use of low-dose aspirin for primary prevention of heart attack and stroke.  Provider has discussed aspirin with patient and our decision was:     Not Indicated:  Daily low-dose aspirin recommended for primary prevention, patient not eligible.      Are you in the first 12 months of your Medicare coverage?  No    Healthy Habits:     In general, how would you rate your overall health?  Good    Frequency of exercise:  None    Do you usually eat at least 4 servings of fruit and vegetables a day, include whole grains    & fiber and avoid regularly eating high fat or \"junk\" foods?  Yes    Taking medications regularly:  Yes    Medication side effects:  Muscle aches and Lightheadedness    Ability to successfully perform activities of daily living:  No assistance needed    Home Safety:  No safety concerns identified    Hearing Impairment:  Difficulty following a conversation in a noisy restaurant or crowded room and difficulty following dialogue in the theater    In the past 6 months, have you been bothered by leaking of urine?  No    In general, how would you rate your overall mental or emotional health?  Good      PHQ-2 Total Score: 0    Additional concerns today:  Yes    Do you feel safe in your environment? Yes    Have you ever done Advance Care Planning? (For example, a Health Directive, POLST, or a discussion with a medical provider or your loved ones about your wishes): Yes, advance care planning is on file.    Is planning to see Dr. Perry Vega " She has one on her leg and some on her abdomen. On the same antibiotics for the last two years. She was referred for a paniculectomy with Dr Dubose. I let her know that our process is to have the pictures before we schedule you to see Dr Dubose. I gave her the phone number and explained that I need her initials,  and label the body part. She expressed understanding and will send pictures.    risk  Fallen 2 or more times in the past year?: (P) No  Any fall with injury in the past year?: (P) Yes    Cognitive Screening   1) Repeat 3 items (Leader, Season, Table)    2) Clock draw: NORMAL  3) 3 item recall: Recalls 1 object   Results: NORMAL clock, 1-2 items recalled: COGNITIVE IMPAIRMENT LESS LIKELY    Mini-CogTM Copyright BRANDON Kyle. Licensed by the author for use in [facility]; reprinted with permission (soob@[facility]). All rights reserved.      Do you have sleep apnea, excessive snoring or daytime drowsiness?: no    VISION   Wears glasses: worn for testing  Tool used: Cannon   Right eye:        10/12.5 (20/25)  Left eye:          10/12.5 (20/25)  Visual Acuity: Pass  H Plus Lens Screening: Pass  Color vision screening: Pass    Reviewed and updated as needed this visit by clinical staff  Tobacco  Allergies  Meds         Reviewed and updated as needed this visit by Provider        Social History     Tobacco Use     Smoking status: Former Smoker     Packs/day: 0.50     Years: 30.00     Pack years: 15.00     Types: Cigarettes     Last attempt to quit: 3/20/1998     Years since quittin.6     Smokeless tobacco: Never Used     Tobacco comment: No smokers in home   Substance Use Topics     Alcohol use: Yes     Alcohol/week: 6.7 standard drinks     Comment: 2x/week         Alcohol Use 2019   Prescreen: >3 drinks/day or >7 drinks/week? No   Prescreen: >3 drinks/day or >7 drinks/week? -               Current providers sharing in care for this patient include:   Patient Care Team:  Caitlin Beasley PA-C as PCP - General (Family Practice)  Caitlin Beasley PA-C as Assigned PCP    The following health maintenance items are reviewed in Epic and correct as of today:  Health Maintenance   Topic Date Due     LOW DOSE ASA FOR PRIMARY PREVENTION  1971     ZOSTER IMMUNIZATION (1 of 2) 2003     ADVANCE CARE PLANNING  2017     MEDICARE ANNUAL WELLNESS VISIT  2018     INFLUENZA  VACCINE (1) 09/01/2019     PNEUMOCOCCAL IMMUNIZATION 65+ LOW/MEDIUM RISK (2 of 2 - PPSV23) 12/26/2019     DEXA  02/19/2020     ASTHMA CONTROL TEST  05/08/2020     PHQ-9  05/21/2020     ASTHMA ACTION PLAN  10/14/2020     FALL RISK ASSESSMENT  11/21/2020     MAMMO SCREENING  11/20/2021     DTAP/TDAP/TD IMMUNIZATION (4 - Td) 02/14/2022     LIPID  01/19/2023     COLONOSCOPY  04/10/2025     HEPATITIS C SCREENING  Completed     DEPRESSION ACTION PLAN  Completed     IPV IMMUNIZATION  Aged Out     MENINGITIS IMMUNIZATION  Aged Out     Labs reviewed in EPIC  BP Readings from Last 3 Encounters:   11/21/19 126/66   11/08/19 112/80   10/14/19 108/60    Wt Readings from Last 3 Encounters:   11/21/19 62.1 kg (137 lb)   11/08/19 62.8 kg (138 lb 6.4 oz)   10/14/19 63 kg (139 lb)                  Pneumonia Vaccine:will need another pneumococcal 23  Mammogram Screening: Mammogram Screening: Patient over age 50, mutual decision to screen reflected in health maintenance.  History of abnormal Pap smear:   NO - age 65 - see link Cervical Cytology Screening Guidelines Last 3 Pap Results:   PAP (no units)   Date Value   11/30/2016 NIL   12/23/2013 NIL   12/06/2010 NIL     Last 3 Pap and HPV Results:   PAP / HPV Latest Ref Rng & Units 12/5/2016 11/30/2016 12/23/2013   PAP - - NIL NIL   HPV 16 DNA NEG Negative - -   HPV 18 DNA NEG Negative - -   OTHER HR HPV NEG Negative - -       Review of Systems   Constitutional: Negative for chills and fever.   HENT: Positive for congestion and ear pain. Negative for hearing loss and sore throat.    Eyes: Positive for visual disturbance. Negative for pain.   Respiratory: Positive for shortness of breath. Negative for cough.    Cardiovascular: Negative for chest pain, palpitations and peripheral edema.   Gastrointestinal: Positive for heartburn. Negative for abdominal pain, constipation, diarrhea, hematochezia and nausea.   Breasts:  Negative for tenderness, breast mass and discharge.   Genitourinary:  "Negative for dysuria, frequency, genital sores, hematuria, pelvic pain, urgency, vaginal bleeding and vaginal discharge.   Musculoskeletal: Positive for arthralgias and myalgias. Negative for joint swelling.   Skin: Negative for rash.   Neurological: Positive for weakness and headaches. Negative for dizziness and paresthesias.   Psychiatric/Behavioral: Negative for mood changes. The patient is not nervous/anxious.      Tinnitus and hearing loss-- to see Ramu     PHILIP and tachycardia- seeing cardio    Heartburn- famotine    Headaches related to neck pain , was doing PT uses some tape to help as well     OBJECTIVE:   /66   Pulse 74   Temp 98.4  F (36.9  C) (Temporal)   Resp 16   Ht 1.584 m (5' 2.36\")   Wt 62.1 kg (137 lb)   LMP 02/26/2008 (Approximate)   BMI 24.77 kg/m   Estimated body mass index is 24.77 kg/m  as calculated from the following:    Height as of this encounter: 1.584 m (5' 2.36\").    Weight as of this encounter: 62.1 kg (137 lb).  Physical Exam  GENERAL APPEARANCE: healthy, alert and no distress  EYES: Eyes grossly normal to inspection, PERRL and conjunctivae and sclerae normal  HENT: ear canals and TM's normal, nose and mouth without ulcers or lesions, oropharynx clear and oral mucous membranes moist  NECK: no adenopathy, no asymmetry, masses, or scars and thyroid normal to palpation  RESP: lungs clear to auscultation - no rales, rhonchi or wheezes  BREAST: normal without masses, tenderness or nipple discharge and no palpable axillary masses or adenopathy  CV: regular rate and rhythm, normal S1 S2, no S3 or S4, no murmur, click or rub, no peripheral edema and peripheral pulses strong  ABDOMEN: soft, nontender, no hepatosplenomegaly, no masses and bowel sounds normal  MS: no musculoskeletal defects are noted and gait is age appropriate without ataxia  SKIN: no suspicious lesions or rashes  NEURO: Normal strength and tone, sensory exam grossly normal, mentation intact and speech " "normal  PSYCH: mentation appears normal and affect normal/bright    Diagnostic Test Results:  Labs reviewed in Epic  No results found. However, due to the size of the patient record, not all encounters were searched. Please check Results Review for a complete set of results.    ASSESSMENT / PLAN:   1. Encounter for Medicare annual wellness exam  We discussed the fact that since she is never had an abnormal Pap smear and she is with her same partner, we may stop Pap smears at this time    2. Other insomnia  Patient requests refill  - nortriptyline (PAMELOR) 25 MG capsule; Take 1 capsule (25 mg) by mouth At Bedtime  Dispense: 90 capsule; Refill: 1    3. Screening for hyperlipidemia    - Lipid panel reflex to direct LDL Fasting    COUNSELING:  Reviewed preventive health counseling, as reflected in patient instructions    Estimated body mass index is 24.77 kg/m  as calculated from the following:    Height as of this encounter: 1.584 m (5' 2.36\").    Weight as of this encounter: 62.1 kg (137 lb).         reports that she quit smoking about 21 years ago. Her smoking use included cigarettes. She has a 15.00 pack-year smoking history. She has never used smokeless tobacco.      Appropriate preventive services were discussed with this patient, including applicable screening as appropriate for cardiovascular disease, diabetes, osteopenia/osteoporosis, and glaucoma.  As appropriate for age/gender, discussed screening for colorectal cancer, prostate cancer, breast cancer, and cervical cancer. Checklist reviewing preventive services available has been given to the patient.    Reviewed patients plan of care and provided an AVS. The Basic Care Plan (routine screening as documented in Health Maintenance) for Margareth meets the Care Plan requirement. This Care Plan has been established and reviewed with the Patient.    Counseling Resources:  ATP IV Guidelines  Pooled Cohorts Equation Calculator  Breast Cancer Risk Calculator  FRAX Risk " Assessment  ICSI Preventive Guidelines  Dietary Guidelines for Americans, 2010  USDA's MyPlate  ASA Prophylaxis  Lung CA Screening    Caitlin Beasley PA-C  [facility]    Identified Health Risks:  Answers for HPI/ROS submitted by the patient on 11/18/2019   Annual Exam:  If you checked off any problems, how difficult have these problems made it for you to do your work, take care of things at home, or get along with other people?: Not difficult at all  PHQ9 TOTAL SCORE: 5  TAMMY 7 TOTAL SCORE: 3

## 2020-06-17 NOTE — TELEPHONE ENCOUNTER
Left message for patient to call back.  Dr Low writes that she did not order this and they can discuss this further at a visit.  Form is in María's green folder.

## 2020-06-17 NOTE — TELEPHONE ENCOUNTER
Patient advised.  She has a video appt 6/19/20 and will discuss at that time.  SERJIO Chandra R.N.

## 2020-06-17 NOTE — TELEPHONE ENCOUNTER
Patient sent a picture of her left breast and there was no wound. I called and LVM to request new pictures and resend them that would be most helpful.

## 2020-06-18 ENCOUNTER — TELEPHONE (OUTPATIENT)
Dept: WOUND CARE | Facility: CLINIC | Age: 50
End: 2020-06-18

## 2020-06-18 NOTE — TELEPHONE ENCOUNTER
Dr Dubose reviewed the pictures and felt that the pt should be seen by Dermatology at [facility].  Pt was called and provided the information.    She hung up on me.    Completed.

## 2020-06-18 NOTE — TELEPHONE ENCOUNTER
"Photo shown to Dr Dubose who felt the wounds to be a \"mild case of Hydradenitis and recommended pt to see a dermatologist at [facility]\".     Pt was called back to discuss this and she hung up on me.    Completed.    "

## 2020-06-18 NOTE — TELEPHONE ENCOUNTER
Pt states that she has sent 6 pictures. Her diagnosis is Hydradenitis supra and she is having a lot of pain.

## 2020-06-19 ENCOUNTER — VIRTUAL VISIT (OUTPATIENT)
Dept: INTERNAL MEDICINE | Facility: CLINIC | Age: 50
End: 2020-06-19
Payer: COMMERCIAL

## 2020-06-19 DIAGNOSIS — I73.9 PAD (PERIPHERAL ARTERY DISEASE) (H): ICD-10-CM

## 2020-06-19 DIAGNOSIS — J44.9 COPD, MODERATE (H): Primary | ICD-10-CM

## 2020-06-19 DIAGNOSIS — R53.81 PHYSICAL DECONDITIONING: ICD-10-CM

## 2020-06-19 PROCEDURE — 99214 OFFICE O/P EST MOD 30 MIN: CPT | Mod: GT | Performed by: INTERNAL MEDICINE

## 2020-06-19 RX ORDER — PREDNISONE 20 MG/1
20 TABLET ORAL DAILY
Qty: 7 TABLET | Refills: 0 | Status: SHIPPED | OUTPATIENT
Start: 2020-06-19 | End: 2020-10-15

## 2020-06-19 RX ORDER — AZITHROMYCIN 250 MG/1
TABLET, FILM COATED ORAL
Qty: 6 TABLET | Refills: 0 | Status: SHIPPED | OUTPATIENT
Start: 2020-06-19 | End: 2020-07-10

## 2020-06-19 NOTE — PATIENT INSTRUCTIONS
Plan:  1. Hold the Metformin for about 2 weeks and see if it helps with the nausea   2. Shower bench   3. Keep the appointment with dermatologist   4. Prednisone 20 mg daily   5. Azithromycin 250 mg, take 2 tablets first day, then 1 tablet a day for the next 4 days (  antibiotic) - if the cough is not getting better  6. Follow up in a month with the blood sugars or sooner if needed

## 2020-06-19 NOTE — PROGRESS NOTES
"Swetha Patterson is a 50 year old female who is being evaluated via a billable telephone visit.      The patient has been notified of following:     \"This telephone visit will be conducted via a call between you and your physician/provider. We have found that certain health care needs can be provided without the need for a physical exam.  This service lets us provide the care you need with a short phone conversation.  If a prescription is necessary we can send it directly to your pharmacy.  If lab work is needed we can place an order for that and you can then stop by our lab to have the test done at a later time.    Telephone visits are billed at different rates depending on your insurance coverage. During this emergency period, for some insurers they may be billed the same as an in-person visit.  Please reach out to your insurance provider with any questions.    If during the course of the call the physician/provider feels a telephone visit is not appropriate, you will not be charged for this service.\"    Patient has given verbal consent for Video visit?  Yes    What phone number would you like to be contacted at?     How would you like to obtain your AVS? MyChart        This is a VIDEO encounter with the patient.       Location of the provider : office  Location of the patient : home          14:04 --- 14:22          Dr Low's note      Patient's instructions / PLAN:                                                        Plan:  1. Hold the Metformin for about 2 weeks and see if it helps with the nausea   2. Shower bench   3. Keep the appointment with dermatologist   4. Prednisone 20 mg daily   5. Azithromycin 250 mg, take 2 tablets first day, then 1 tablet a day for the next 4 days (  antibiotic) - if the cough is not getting better  6. Follow up in a month with the blood sugars or sooner if needed          ASSESSMENT & PLAN:                                                      (J44.9) COPD, moderate (H)  " (primary encounter diagnosis)  Comment: possible exacerbation   Plan: Miscellaneous Order for DME - ONLY FOR DME,         predniSONE (DELTASONE) 20 MG tablet,         azithromycin (ZITHROMAX) 250 MG tablet            (I73.9) PAD (peripheral artery disease) (H)  (R53.81) Physical deconditioning  Comment:   Plan: Miscellaneous Order for DME - ONLY FOR DME - shower chair               Chief complaint:                                                      Follow up chronic medical problems    Shower chair  COPD    SUBJECTIVE:                                                    History of present illness:    PreDM  -- Changing Metformin hasn't helped with the nausea. She has been taking Zofran  -- -140     Needs Shower chair  -- she states that the legs get very tired and then she feels more SOB    Insect bite   -- she has several red marks on the skin     COPD  -- feels her chest is thight and she has more wheezes. She would like refill for ZPack and Prednisone     LOV Plan:  1. Change Metformin to 1/2 tablet with the breakfast and 1/2 tablet with dinner   2. Ondansetron 8 mg - take 1/2 - 1 tab 3 times a day as needed for nausea   3. Continue the other meds, same doses for now.      Review of Systems:                                                      ROS: negative for fever, chills, wheezes, chest pain, , vomiting, abdominal pain, leg swelling pos chr cough and SOB      OBJECTIVE:           An actual physical exam can't be done during phone visit   A limited exam can sometimes be performed by video visit       PMHx: reviewed  Past Medical History:   Diagnosis Date     Anxiety state, unspecified      Asthma      Chronic pain     back pain from cyst     Contact dermatitis and other eczema, due to unspecified cause      COPD (chronic obstructive pulmonary disease) (H)      Depressive disorder, not elsewhere classified      Emphysema with chronic bronchitis (H)      Esophageal reflux      Family history of ischemic  "heart disease      Gastro-oesophageal reflux disease      History of emphysema      Hoarseness      HTN, goal below 140/90      Hyperlipidemia LDL goal <130      Liver disease     \"fatty liver\"     Other chronic pain     chest, from coughing     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)      PONV (postoperative nausea and vomiting)       PSHx: reviewed  Past Surgical History:   Procedure Laterality Date     ARTHROSCOPY SHOULDER DECOMPRESSION Left 10/21/2015    Procedure: ARTHROSCOPY SHOULDER DECOMPRESSION;  Surgeon: Julien Milian MD;  Location: [facility] OR     BIOPSY ARTERY TEMPORAL Left 3/11/2020    Procedure: LEFT TEMPORAL ARTERY BIOPSY;  Surgeon: Ibeth Conner MD;  Location: [facility] OR     BRONCHIAL THERMOPLASTY N/A 11/14/2014    Procedure: BRONCHIAL THERMOPLASTY;  Surgeon: Ward Whitaker MD;  Location: [facility]     BRONCHIAL THERMOPLASTY N/A 12/19/2014    Procedure: BRONCHIAL THERMOPLASTY;  Surgeon: Ward Whitaker MD;  [facility]     BRONCHIAL THERMOPLASTY N/A 2/6/2015    Procedure: BRONCHIAL THERMOPLASTY;  Surgeon: Ward Whitaker MD;  [facility]: [facility]     C APPENDECTOMY  at age 17     COLONOSCOPY N/A 11/26/2018    Procedure: COLONOSCOPY ([facility]);  Surgeon: Marshall Oakes MD;  Location: [facility]     DISCECTOMY, FUSION CERVICAL ANTERIOR ONE LEVEL, COMBINED N/A 5/1/2018    Procedure: COMBINED DISCECTOMY, FUSION CERVICAL ANTERIOR ONE LEVEL;  1.  C5-C6 anterior cervical diskectomy and fusion.    2.  C5-C6 application of intervertebral biomechanical device for interbody fusion purposes.    3.  C5-C6 anterior instrumentation using the standard Kristin InViZia 24 mm plate with four associated bone screws, 12 mm in length.  Inferior screws are fixed.  Superior screws are va     ENT SURGERY  2015    polyps removed from vocal cords      EXCISE NODE MEDIASTINAL  4/26/2013    Procedure: EXCISE NODE MEDIASTINAL;;  Surgeon: Av Peña MD;  Location: [facility] OR     LAPAROSCOPIC " CHOLECYSTECTOMY N/A 10/19/2017    Procedure: LAPAROSCOPIC CHOLECYSTECTOMY;  LAPAROSCOPIC CHOLECYSTECTOMY;  Surgeon: Ney Jerry MD;  Location: [facility] OR     THORACOSCOPY  4/26/2013    Procedure: THORACOSCOPY;  LEFT VIDEO ASSISTED THORACOSCOPY, RESECTION OF POSTERIOR MEDIASTINAL MASS;  Surgeon: Av Peña MD;  Location: [facility] OR     TONSILLECTOMY  as a kid     TONSILLECTOMY          Meds: reviewed  Current Outpatient Medications   Medication Sig Dispense Refill     acetaminophen (TYLENOL) 325 MG tablet Take 3 tablets (975 mg) by mouth every 6 hours as needed for pain 50 tablet 0     albuterol (PROAIR HFA/PROVENTIL HFA/VENTOLIN HFA) 108 (90 Base) MCG/ACT inhaler Inhale 2 puffs into the lungs every 6 hours as needed for shortness of breath / dyspnea 18 g 3     albuterol (PROVENTIL) (2.5 MG/3ML) 0.083% neb solution Take 1 vial (2.5 mg) by nebulization every 6 hours as needed for shortness of breath / dyspnea or wheezing 25 vial 3     amLODIPine (NORVASC) 5 MG tablet Take 1 tablet (5 mg) by mouth daily 90 tablet 0     atorvastatin (LIPITOR) 80 MG tablet Take 1 tablet (80 mg) by mouth daily 90 tablet 0     blood glucose (NO BRAND SPECIFIED) lancets standard Use to test blood sugar 1 time daily 100 each 1     blood glucose (NO BRAND SPECIFIED) test strip Use to test blood sugar 1 times daily or as directed. 100 strip 0     blood glucose monitoring (NO BRAND SPECIFIED) meter device kit Use to test blood sugar 1 times daily or as directed. 1 kit 0     budesonide-formoterol (SYMBICORT) 160-4.5 MCG/ACT IN Inhaler Inhale 2 puffs into the lungs 2 times daily 3 Inhaler 1     buPROPion 150 MG PO 24 hr tablet Take 1 tablet (150 mg) by mouth every morning 90 tablet 1     cetirizine HCl 10 MG CAPS Take 1 capsule (10 mg) by mouth daily as needed Takes in the spring. 90 capsule 3     clindamycin 1 % EX external gel Apply topically 2 times daily 60 g 3     desonide 0.05 % EX external cream Apply topically as needed (rash)  60 g 0     doxycycline hyclate (VIBRA-TABS) 100 MG tablet Take 1 tablet (100 mg) by mouth 2 times daily 180 tablet 0     fluticasone 50 MCG/ACT NA nasal spray Spray 2 sprays in nostril daily 16 g 5     furosemide (LASIX) 20 MG tablet Take 1 tablet (20 mg) by mouth daily 90 tablet 0     hydrOXYzine (ATARAX) 50 MG tablet Take 2 tablets (100 mg) by mouth 3 times daily 180 tablet 3     ibuprofen (ADVIL/MOTRIN) 600 MG tablet Take 1 tablet (600 mg) by mouth every 6 hours as needed for pain 30 tablet 0     lisinopril (ZESTRIL) 20 MG tablet Take 1 tablet (20 mg) by mouth daily 90 tablet 0     metFORMIN (GLUCOPHAGE) 500 MG tablet Take 1 tablet (500 mg) by mouth daily (with breakfast) 90 tablet 0     montelukast (SINGULAIR) 10 MG tablet TAKE ONE TABLET BY MOUTH AT BEDTIME 90 tablet 0     nystatin [id number] UNIT/GM EX external powder Apply topically 2 times daily as needed (skin rash) 60 g 11     omeprazole 20 MG tablet Take 1 tablet (20 mg) by mouth 2 times daily 180 tablet 1     ondansetron (ZOFRAN) 8 MG tablet Take 0.5-1 tablets (4-8 mg) by mouth every 8 hours as needed for nausea 30 tablet 1     order for DME Equipment being ordered: Digital home blood pressure monitor kit 1 Device 0     order for DME Equipment being ordered: Pulse Oxymeter 1 Device 0     senna-docusate (SENOKOT-S/PERICOLACE) 8.6-50 MG tablet Take 1-2 tablets by mouth 2 times daily Use while taking narcotic pain medication to prevent constipation. 10 tablet 0     tiotropium (SPIRIVA HANDIHALER) 18 MCG IN inhaled capsule Inhale contents of one capsule daily. 90 capsule 3     benzoyl peroxide 10 % EX external gel Apply topically 2 times daily (Patient not taking: Reported on 6/19/2020) 90 g 3     metroNIDAZOLE (METROGEL) 0.75 % external gel Apply topically 2 times daily (Patient not taking: Reported on 6/19/2020) 45 g 1     nicotine (EQ NICOTINE POLACRILEX) 2 MG lozenge Place 1 lozenge (2 mg) inside cheek every hour as needed for smoking cessation (Patient not  taking: Reported on 6/19/2020) 360 lozenge 1     nicotine (NICORETTE) 2 MG gum Place 1 each (2 mg) inside cheek as needed for smoking cessation (Patient not taking: Reported on 6/19/2020) 150 each 1     nicotine 14 MG/24HR TD 24 hr patch Place 1 patch onto the skin every 24 hours (Patient not taking: Reported on 6/19/2020) 30 patch 1     nitroGLYcerin (NITROSTAT) 0.4 MG sublingual tablet FOR CHEST PAIN PLACE ONE TABLET UNDER THE TONGUE EVERY 5 MINUTES FOR 3 DOSES.  IF SYPTOMS PERSIST 5 MINUTES AFTER 1ST DOSE CALL 911 (Patient not taking: Reported on 6/19/2020) 25 tablet 0     varenicline (CHANTIX) 1 MG tablet Take 1 tablet (1 mg) by mouth 2 times daily (Patient not taking: Reported on 6/19/2020) 60 tablet 3       Soc Hx: reviewed  Fam Hx: reviewed        Hyperlipidemia Follow-Up      Are you regularly taking any medication or supplement to lower your cholesterol?   Yes- lipitor    Are you having muscle aches or other side effects that you think could be caused by your cholesterol lowering medication?  No    Hypertension Follow-up      Do you check your blood pressure regularly outside of the clinic? Yes     Are you following a low salt diet? Yes    Are your blood pressures ever more than 140 on the top number (systolic) OR more   than 90 on the bottom number (diastolic), for example 140/90? No      How many servings of fruits and vegetables do you eat daily?  0-1    On average, how many sweetened beverages do you drink each day (Examples: soda, juice, sweet tea, etc.  Do NOT count diet or artificially sweetened beverages)?   0    How many days per week do you exercise enough to make your heart beat faster? 7    How many minutes a day do you exercise enough to make your heart beat faster? 20 - 29    How many days per week do you miss taking your medication? 0          Roro Low MD  Internal Medicine

## 2020-06-26 ENCOUNTER — MEDICAL CORRESPONDENCE (OUTPATIENT)
Dept: HEALTH INFORMATION MANAGEMENT | Facility: CLINIC | Age: 50
End: 2020-06-26

## 2020-06-26 ENCOUNTER — TELEPHONE (OUTPATIENT)
Dept: INTERNAL MEDICINE | Facility: CLINIC | Age: 50
End: 2020-06-26

## 2020-06-27 DIAGNOSIS — Z53.9 DIAGNOSIS NOT YET DEFINED: Primary | ICD-10-CM

## 2020-06-27 PROCEDURE — G0179 MD RECERTIFICATION HHA PT: HCPCS | Performed by: INTERNAL MEDICINE

## 2020-06-29 ENCOUNTER — PRE VISIT (OUTPATIENT)
Dept: PULMONOLOGY | Facility: CLINIC | Age: 50
End: 2020-06-29

## 2020-07-02 ENCOUNTER — OFFICE VISIT (OUTPATIENT)
Dept: DERMATOLOGY | Facility: CLINIC | Age: 50
End: 2020-07-02

## 2020-07-02 ENCOUNTER — PRE VISIT (OUTPATIENT)
Dept: DERMATOLOGY | Facility: CLINIC | Age: 50
End: 2020-07-02

## 2020-07-02 VITALS — HEART RATE: 87 BPM | SYSTOLIC BLOOD PRESSURE: 111 MMHG | DIASTOLIC BLOOD PRESSURE: 75 MMHG

## 2020-07-02 DIAGNOSIS — L73.2 HIDRADENITIS SUPPURATIVA: Primary | ICD-10-CM

## 2020-07-02 DIAGNOSIS — R21 RASH AND NONSPECIFIC SKIN ERUPTION: ICD-10-CM

## 2020-07-02 RX ORDER — PREDNISONE 10 MG/1
TABLET ORAL
Qty: 21 TABLET | Refills: 0 | Status: SHIPPED | OUTPATIENT
Start: 2020-07-02 | End: 2020-07-16

## 2020-07-02 RX ORDER — SULFAMETHOXAZOLE/TRIMETHOPRIM 800-160 MG
1 TABLET ORAL 2 TIMES DAILY
Qty: 60 TABLET | Refills: 3 | Status: SHIPPED | OUTPATIENT
Start: 2020-07-02 | End: 2020-11-23

## 2020-07-02 ASSESSMENT — PAIN SCALES - GENERAL: PAINLEVEL: MODERATE PAIN (5)

## 2020-07-02 NOTE — PROGRESS NOTES
[facility] Dermatology Note      Dermatology Problem List:  1.Hidradenitis suppurativaFidel stage II  -Current tx: TMP--160 mg BID, BPO wash  -Consider derm surg referral/excision at follow-up  -Prior tx: doxycycline 100 mg BID for many years, clindamycin gave her diarrhea    Encounter Date: Jul 2, 2020    CC:   Chief Complaint   Patient presents with     Derm Problem     here for HS in groin and stomach and buttocks     History of Present Illness:  Ms. Swetha Patterson is a 50 year old female who presents as a referral from Roro Chawla MD for hidradenitis suppurativa. Patient was seen in Dermatology in 2016 by Dr. Maciel at which time doxycycline BID, benzalcin, hibiclens were started and smoking cessation and weight loss were discussed. Patient states that this has been going on for years. She continues on doxycyline 100 mg BID but thinks it stopped working. She has been using a chlorahexadine cleansing soap. She quit smoking last week and is using gum, lozenges and Chantix. She states that he is wondering about excision since she had some cut out around 2001 and they never came back. She gets flares about every other week. She gets flares in same spots, but has been getting a new spot every few months or so. She is post menopausal and has not had a menstrual cycle in about 5 years. She otherwise is feeling well and has no concerns.    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     DIAZ (nonalcoholic steatohepatitis)     Cervical HNP C5-6     Opioid type dependence, continuous (H)     Suicide attempt (H)     Intentional drug overdose (H)     Status post cervical spinal fusion     Depression     Vocal cord polyp     HTN, goal below 140/90  "    COPD, moderate (H)     History of opioid abuse (H) Rx was stopped when she failed urine drug test     Claudication of both lower extremities (H)     PAD (peripheral artery disease) (H)     Jaw claudication     Past Medical History:   Diagnosis Date     Anxiety state, unspecified      Asthma      Chronic pain     back pain from cyst     Contact dermatitis and other eczema, due to unspecified cause      COPD (chronic obstructive pulmonary disease) (H)      Depressive disorder, not elsewhere classified      Emphysema with chronic bronchitis (H)      Esophageal reflux      Family history of ischemic heart disease      Gastro-oesophageal reflux disease      History of emphysema      Hoarseness      HTN, goal below 140/90      Hyperlipidemia LDL goal <130      Liver disease     \"fatty liver\"     Other chronic pain     chest, from coughing     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)      PONV (postoperative nausea and vomiting)      Past Surgical History:   Procedure Laterality Date     ARTHROSCOPY SHOULDER DECOMPRESSION Left 10/21/2015    Procedure: ARTHROSCOPY SHOULDER DECOMPRESSION;  Surgeon: Julien Milian MD;  Location: [facility] OR     BIOPSY ARTERY TEMPORAL Left 3/11/2020    Procedure: LEFT TEMPORAL ARTERY BIOPSY;  Surgeon: Ibeth Conner MD;  Location: [facility] OR     BRONCHIAL THERMOPLASTY N/A 11/14/2014    Procedure: BRONCHIAL THERMOPLASTY;  Surgeon: Ward Whitaker MD;  Location: [facility]     BRONCHIAL THERMOPLASTY N/A 12/19/2014    Procedure: BRONCHIAL THERMOPLASTY;  Surgeon: Ward Whitaker MD;  Location: [facility]     BRONCHIAL THERMOPLASTY N/A 2/6/2015    Procedure: BRONCHIAL THERMOPLASTY;  Surgeon: Ward Whitaker MD;  Location: [facility]     C APPENDECTOMY  at age 17     COLONOSCOPY N/A 11/26/2018    Procedure: COLONOSCOPY ([facility]);  Surgeon: Marshall Oakes MD;  Location: [facility] OR     DISCECTOMY, FUSION CERVICAL ANTERIOR ONE LEVEL, COMBINED N/A 5/1/2018    Procedure: COMBINED DISCECTOMY, " FUSION CERVICAL ANTERIOR ONE LEVEL;  1.  C5-C6 anterior cervical diskectomy and fusion.    2.  C5-C6 application of intervertebral biomechanical device for interbody fusion purposes.    3.  C5-C6 anterior instrumentation using the standard Kristin InViZia 24 mm plate with four associated bone screws, 12 mm in length.  Inferior screws are fixed.  Superior screws are va     ENT SURGERY  2015    polyps removed from vocal cords      EXCISE NODE MEDIASTINAL  4/26/2013    Procedure: EXCISE NODE MEDIASTINAL;;  Surgeon: Av Peña MD;  Location:  OR     LAPAROSCOPIC CHOLECYSTECTOMY N/A 10/19/2017    Procedure: LAPAROSCOPIC CHOLECYSTECTOMY;  LAPAROSCOPIC CHOLECYSTECTOMY;  Surgeon: Ney Jerry MD;  Location:  OR     THORACOSCOPY  4/26/2013    Procedure: THORACOSCOPY;  LEFT VIDEO ASSISTED THORACOSCOPY, RESECTION OF POSTERIOR MEDIASTINAL MASS;  Surgeon: Av Peña MD;  Location:  OR     TONSILLECTOMY  as a kid     TONSILLECTOMY         Social History:  Patient has smoked for over 30 years, but recently stopped.    Family History:  Family History   Problem Relation Age of Onset     Heart Disease Mother         murmur, mi     Hypertension Mother      Cerebrovascular Disease Mother      Depression Mother      Gallbladder Disease Mother      Asthma Mother      Family History Negative Father         does not know  him     Family History Negative Brother      Heart Disease Maternal Grandfather      Asthma Maternal Grandfather      Hypertension Maternal Grandfather      Cerebrovascular Disease Maternal Grandfather      Diabetes Maternal Grandfather      Asthma Sister      Hypertension Sister      Cerebrovascular Disease Sister      Hypertension Maternal Grandmother      Cerebrovascular Disease Maternal Grandmother        Medications:  Current Outpatient Medications   Medication Sig Dispense Refill     acetaminophen (TYLENOL) 325 MG tablet Take 3 tablets (975 mg) by mouth every 6 hours as needed  for pain 50 tablet 0     albuterol (PROAIR HFA/PROVENTIL HFA/VENTOLIN HFA) 108 (90 Base) MCG/ACT inhaler Inhale 2 puffs into the lungs every 6 hours as needed for shortness of breath / dyspnea 18 g 3     albuterol (PROVENTIL) (2.5 MG/3ML) 0.083% neb solution Take 1 vial (2.5 mg) by nebulization every 6 hours as needed for shortness of breath / dyspnea or wheezing 25 vial 3     amLODIPine (NORVASC) 5 MG tablet Take 1 tablet (5 mg) by mouth daily 90 tablet 0     atorvastatin (LIPITOR) 80 MG tablet Take 1 tablet (80 mg) by mouth daily 90 tablet 0     blood glucose (NO BRAND SPECIFIED) lancets standard Use to test blood sugar 1 time daily 100 each 1     blood glucose (NO BRAND SPECIFIED) test strip Use to test blood sugar 1 times daily or as directed. 100 strip 0     blood glucose monitoring (NO BRAND SPECIFIED) meter device kit Use to test blood sugar 1 times daily or as directed. 1 kit 0     budesonide-formoterol (SYMBICORT) 160-4.5 MCG/ACT IN Inhaler Inhale 2 puffs into the lungs 2 times daily 3 Inhaler 1     buPROPion 150 MG PO 24 hr tablet Take 1 tablet (150 mg) by mouth every morning 90 tablet 1     cetirizine HCl 10 MG CAPS Take 1 capsule (10 mg) by mouth daily as needed Takes in the spring. 90 capsule 3     clindamycin 1 % EX external gel Apply topically 2 times daily 60 g 3     desonide 0.05 % EX external cream Apply topically as needed (rash) 60 g 0     doxycycline hyclate (VIBRA-TABS) 100 MG tablet Take 1 tablet (100 mg) by mouth 2 times daily 180 tablet 0     fluticasone 50 MCG/ACT NA nasal spray Spray 2 sprays in nostril daily 16 g 5     furosemide (LASIX) 20 MG tablet Take 1 tablet (20 mg) by mouth daily 90 tablet 0     hydrOXYzine (ATARAX) 50 MG tablet Take 2 tablets (100 mg) by mouth 3 times daily 180 tablet 3     ibuprofen (ADVIL/MOTRIN) 600 MG tablet Take 1 tablet (600 mg) by mouth every 6 hours as needed for pain 30 tablet 0     lisinopril (ZESTRIL) 20 MG tablet Take 1 tablet (20 mg) by mouth daily 90  tablet 0     metFORMIN (GLUCOPHAGE) 500 MG tablet Take 1 tablet (500 mg) by mouth daily (with breakfast) 90 tablet 0     omeprazole 20 MG tablet Take 1 tablet (20 mg) by mouth 2 times daily 180 tablet 1     ondansetron (ZOFRAN) 8 MG tablet Take 0.5-1 tablets (4-8 mg) by mouth every 8 hours as needed for nausea 30 tablet 1     order for DME Equipment being ordered: Digital home blood pressure monitor kit 1 Device 0     order for DME Equipment being ordered: Pulse Oxymeter 1 Device 0     predniSONE (DELTASONE) 20 MG tablet Take 1 tablet (20 mg) by mouth daily 7 tablet 0     senna-docusate (SENOKOT-S/PERICOLACE) 8.6-50 MG tablet Take 1-2 tablets by mouth 2 times daily Use while taking narcotic pain medication to prevent constipation. 10 tablet 0     tiotropium (SPIRIVA HANDIHALER) 18 MCG IN inhaled capsule Inhale contents of one capsule daily. 90 capsule 3     varenicline (CHANTIX) 1 MG tablet Take 1 tablet (1 mg) by mouth 2 times daily 60 tablet 3     benzoyl peroxide 10 % EX external gel Apply topically 2 times daily (Patient not taking: Reported on 6/19/2020) 90 g 3     metroNIDAZOLE (METROGEL) 0.75 % external gel Apply topically 2 times daily (Patient not taking: Reported on 6/19/2020) 45 g 1     montelukast (SINGULAIR) 10 MG tablet TAKE ONE TABLET BY MOUTH AT BEDTIME (Patient not taking: Reported on 7/2/2020) 90 tablet 0     nicotine (EQ NICOTINE POLACRILEX) 2 MG lozenge Place 1 lozenge (2 mg) inside cheek every hour as needed for smoking cessation (Patient not taking: Reported on 6/19/2020) 360 lozenge 1     nicotine (NICORETTE) 2 MG gum Place 1 each (2 mg) inside cheek as needed for smoking cessation (Patient not taking: Reported on 6/19/2020) 150 each 1     nicotine 14 MG/24HR TD 24 hr patch Place 1 patch onto the skin every 24 hours (Patient not taking: Reported on 6/19/2020) 30 patch 1     nitroGLYcerin (NITROSTAT) 0.4 MG sublingual tablet FOR CHEST PAIN PLACE ONE TABLET UNDER THE TONGUE EVERY 5 MINUTES FOR 3  DOSES.  IF SYPTOMS PERSIST 5 MINUTES AFTER 1ST DOSE CALL 911 (Patient not taking: Reported on 6/19/2020) 25 tablet 0     nystatin 364223 UNIT/GM EX external powder Apply topically 2 times daily as needed (skin rash) (Patient not taking: Reported on 7/2/2020) 60 g 11        Allergies   Allergen Reactions     Codeine Nausea and Vomiting     vomiting     Cyclobenzaprine Nausea     Hydrocodone Nausea and Vomiting     Sulfa Drugs Nausea and Vomiting     Nausea     Gabapentin Rash         Review of Systems:  -Skin/Heme New Pt: The patient denies frequent sun exposure. The patient denies excessive scarring or problems healing except as per HPI. The patient denies excessive bleeding.  -Constitutional: Otherwise feeling well today, in usual state of health.  -HEENT: Patient denies nonhealing oral sores.  -Skin: As above in HPI. No additional skin concerns.    Physical exam:  Vitals: /75 (BP Location: Left arm)   Pulse 87   LMP 04/15/2016 (Exact Date)   GEN: This is a well developed, well-nourished female in no acute distress, in a pleasant mood.    SKIN: Focused examination of the face,  was performed.  -Albarran skin type: II  -Soft abscess on right upper thigh with overlying violaceous erythema  -Smaller abscess with overlying scar on left lower pannus  -Pink scarred papule under right pannus  - Scarred tracts under pannus, under left breast  -Scattered inflammatory scarred papules on right lower abdomen  -R and L axillae clear  -Scarred papule under left breast  -No other lesions of concern on areas examined.     Impression/Plan:  1. Hidradenitis suppurativa, Parra stage II, flaring. Doxycycline used to work, but recently she has notice decreased efficacy. We discussed alternative medications including minocycline and Bactrim (she has allergy listed, but states it was only nausea and she would be willing to try it again). She really would like these lesions excised, but would not like anything under local  anesthesia. Of note, prednisone really helps her HS flares.  -Stop doxycycline  -Start Bactrim DS BID  -Prednisone taper 20 mg x 7 days, 10 mg x 7 days for flare  -Consider surgical intervention in future, but patient would only like general anesthesia.  -Stop hibiclens and switch to BPO wash    CC Roro Chawla MD  [address] [phone] on close of this encounter.     Follow-up in 3 months, earlier for new or changing lesions.      staffed the patient.    Staff Involved:  Resident(Сергей Newell)/Staff    Сергей Newell MD  Dermatology Resident, PGY-3    Staff Physician Comments:   I saw and evaluated the patient with the resident and I agree with the assessment and plan.  I was present for the examination.    Lamont Jordan MD, FAAD    Departments of Internal Medicine and Dermatology  [facility]  [phone]

## 2020-07-02 NOTE — NURSING NOTE
Chief Complaint   Patient presents with     Derm Problem     here for HS in groin and stomach and buttocks     Tamara Sanchez, EMT

## 2020-07-02 NOTE — LETTER
7/2/2020       RE: Swetha Patterson  [address] [place] [address]     Dear Colleague,    Thank you for referring your patient, Swetha Patterson, to the [facility] DERMATOLOGY at [facility]. Please see a copy of my visit note below.    [facility] Dermatology Note      Dermatology Problem List:  1.Hidradenitis suppurativaFidel stage II  -Current tx: TMP--160 mg BID, BPO wash  -Consider derm surg referral/excision at follow-up  -Prior tx: doxycycline 100 mg BID for many years, clindamycin gave her diarrhea    Encounter Date: Jul 2, 2020    CC:   Chief Complaint   Patient presents with     Derm Problem     here for HS in groin and stomach and buttocks     History of Present Illness:  Ms. Swetha Patterson is a 50 year old female who presents as a referral from Roro Chawla MD for hidradenitis suppurativa. Patient was seen in Dermatology in 2016 by Dr. Maciel at which time doxycycline BID, benzalcin, hibiclens were started and smoking cessation and weight loss were discussed. Patient states that this has been going on for years. She continues on doxycyline 100 mg BID but thinks it stopped working. She has been using a chlorahexadine cleansing soap. She quit smoking last week and is using gum, lozenges and Chantix. She states that he is wondering about excision since she had some cut out around 2001 and they never came back. She gets flares about every other week. She gets flares in same spots, but has been getting a new spot every few months or so. She is post menopausal and has not had a menstrual cycle in about 5 years. She otherwise is feeling well and has no concerns.    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     DIAZ (nonalcoholic steatohepatitis)     Cervical HNP C5-6     Opioid type dependence, continuous (H)     Suicide attempt (H)     Intentional drug overdose (H)     Status post cervical spinal fusion     Depression     Vocal cord polyp     HTN, goal below 140/90     COPD, moderate (H)     History of opioid abuse (H) Rx was stopped when she failed urine drug test     Claudication of both lower extremities (H)     PAD (peripheral artery disease) (H)     Jaw claudication     Past Medical History:   Diagnosis Date     Anxiety state, unspecified      Asthma      Chronic pain     back pain from cyst     Contact dermatitis and other eczema, due to unspecified cause      COPD (chronic obstructive pulmonary disease) (H)      Depressive disorder, not elsewhere classified      Emphysema with chronic bronchitis (H)      Esophageal reflux      Family history of ischemic heart disease      Gastro-oesophageal reflux disease      History of emphysema      Hoarseness      HTN, goal below 140/90      Hyperlipidemia LDL goal <130      Liver disease     \"fatty liver\"     Other chronic pain     chest, from coughing     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)      PONV (postoperative nausea and vomiting)      Past Surgical History:   Procedure Laterality Date     ARTHROSCOPY SHOULDER DECOMPRESSION Left 10/21/2015    Procedure: ARTHROSCOPY SHOULDER DECOMPRESSION;  Surgeon: Julien Milian MD;  Location: [facility] OR     BIOPSY ARTERY TEMPORAL Left 3/11/2020    Procedure: LEFT TEMPORAL ARTERY BIOPSY;  Surgeon: Ibeth Conner MD;  Location: [facility] OR     BRONCHIAL THERMOPLASTY N/A 11/14/2014    Procedure: BRONCHIAL THERMOPLASTY;  Surgeon: Ward Whitaker MD;  Location: [facility] GI     BRONCHIAL THERMOPLASTY N/A 12/19/2014    Procedure: BRONCHIAL THERMOPLASTY;  Surgeon: Ward Whitaker MD;  Location: [facility] OR     BRONCHIAL THERMOPLASTY N/A 2/6/2015    Procedure: BRONCHIAL THERMOPLASTY;  Surgeon: Ward Whitaker" MD Raghavendra;  Location: [facility] OR     C APPENDECTOMY  at age 17     COLONOSCOPY N/A 11/26/2018    Procedure: COLONOSCOPY ([facility]);  Surgeon: Marshall Oakes MD;  Location: [facility] OR     DISCECTOMY, FUSION CERVICAL ANTERIOR ONE LEVEL, COMBINED N/A 5/1/2018    Procedure: COMBINED DISCECTOMY, FUSION CERVICAL ANTERIOR ONE LEVEL;  1.  C5-C6 anterior cervical diskectomy and fusion.    2.  C5-C6 application of intervertebral biomechanical device for interbody fusion purposes.    3.  C5-C6 anterior instrumentation using the standard Kristin InViZia 24 mm plate with four associated bone screws, 12 mm in length.  Inferior screws are fixed.  Superior screws are va     ENT SURGERY  2015    polyps removed from vocal cords      EXCISE NODE MEDIASTINAL  4/26/2013    Procedure: EXCISE NODE MEDIASTINAL;;  Surgeon: Av Peña MD;  Location:  OR     LAPAROSCOPIC CHOLECYSTECTOMY N/A 10/19/2017    Procedure: LAPAROSCOPIC CHOLECYSTECTOMY;  LAPAROSCOPIC CHOLECYSTECTOMY;  Surgeon: Ney Jerry MD;  Location:  OR     THORACOSCOPY  4/26/2013    Procedure: THORACOSCOPY;  LEFT VIDEO ASSISTED THORACOSCOPY, RESECTION OF POSTERIOR MEDIASTINAL MASS;  Surgeon: Av Peña MD;  Location:  OR     TONSILLECTOMY  as a kid     TONSILLECTOMY         Social History:  Patient has smoked for over 30 years, but recently stopped.    Family History:  Family History   Problem Relation Age of Onset     Heart Disease Mother         murmur, mi     Hypertension Mother      Cerebrovascular Disease Mother      Depression Mother      Gallbladder Disease Mother      Asthma Mother      Family History Negative Father         does not know  him     Family History Negative Brother      Heart Disease Maternal Grandfather      Asthma Maternal Grandfather      Hypertension Maternal Grandfather      Cerebrovascular Disease Maternal Grandfather      Diabetes Maternal Grandfather      Asthma Sister      Hypertension Sister      Cerebrovascular  Disease Sister      Hypertension Maternal Grandmother      Cerebrovascular Disease Maternal Grandmother        Medications:  Current Outpatient Medications   Medication Sig Dispense Refill     acetaminophen (TYLENOL) 325 MG tablet Take 3 tablets (975 mg) by mouth every 6 hours as needed for pain 50 tablet 0     albuterol (PROAIR HFA/PROVENTIL HFA/VENTOLIN HFA) 108 (90 Base) MCG/ACT inhaler Inhale 2 puffs into the lungs every 6 hours as needed for shortness of breath / dyspnea 18 g 3     albuterol (PROVENTIL) (2.5 MG/3ML) 0.083% neb solution Take 1 vial (2.5 mg) by nebulization every 6 hours as needed for shortness of breath / dyspnea or wheezing 25 vial 3     amLODIPine (NORVASC) 5 MG tablet Take 1 tablet (5 mg) by mouth daily 90 tablet 0     atorvastatin (LIPITOR) 80 MG tablet Take 1 tablet (80 mg) by mouth daily 90 tablet 0     blood glucose (NO BRAND SPECIFIED) lancets standard Use to test blood sugar 1 time daily 100 each 1     blood glucose (NO BRAND SPECIFIED) test strip Use to test blood sugar 1 times daily or as directed. 100 strip 0     blood glucose monitoring (NO BRAND SPECIFIED) meter device kit Use to test blood sugar 1 times daily or as directed. 1 kit 0     budesonide-formoterol (SYMBICORT) 160-4.5 MCG/ACT IN Inhaler Inhale 2 puffs into the lungs 2 times daily 3 Inhaler 1     buPROPion 150 MG PO 24 hr tablet Take 1 tablet (150 mg) by mouth every morning 90 tablet 1     cetirizine HCl 10 MG CAPS Take 1 capsule (10 mg) by mouth daily as needed Takes in the spring. 90 capsule 3     clindamycin 1 % EX external gel Apply topically 2 times daily 60 g 3     desonide 0.05 % EX external cream Apply topically as needed (rash) 60 g 0     doxycycline hyclate (VIBRA-TABS) 100 MG tablet Take 1 tablet (100 mg) by mouth 2 times daily 180 tablet 0     fluticasone 50 MCG/ACT NA nasal spray Spray 2 sprays in nostril daily 16 g 5     furosemide (LASIX) 20 MG tablet Take 1 tablet (20 mg) by mouth daily 90 tablet 0      hydrOXYzine (ATARAX) 50 MG tablet Take 2 tablets (100 mg) by mouth 3 times daily 180 tablet 3     ibuprofen (ADVIL/MOTRIN) 600 MG tablet Take 1 tablet (600 mg) by mouth every 6 hours as needed for pain 30 tablet 0     lisinopril (ZESTRIL) 20 MG tablet Take 1 tablet (20 mg) by mouth daily 90 tablet 0     metFORMIN (GLUCOPHAGE) 500 MG tablet Take 1 tablet (500 mg) by mouth daily (with breakfast) 90 tablet 0     omeprazole 20 MG tablet Take 1 tablet (20 mg) by mouth 2 times daily 180 tablet 1     ondansetron (ZOFRAN) 8 MG tablet Take 0.5-1 tablets (4-8 mg) by mouth every 8 hours as needed for nausea 30 tablet 1     order for DME Equipment being ordered: Digital home blood pressure monitor kit 1 Device 0     order for DME Equipment being ordered: Pulse Oxymeter 1 Device 0     predniSONE (DELTASONE) 20 MG tablet Take 1 tablet (20 mg) by mouth daily 7 tablet 0     senna-docusate (SENOKOT-S/PERICOLACE) 8.6-50 MG tablet Take 1-2 tablets by mouth 2 times daily Use while taking narcotic pain medication to prevent constipation. 10 tablet 0     tiotropium (SPIRIVA HANDIHALER) 18 MCG IN inhaled capsule Inhale contents of one capsule daily. 90 capsule 3     varenicline (CHANTIX) 1 MG tablet Take 1 tablet (1 mg) by mouth 2 times daily 60 tablet 3     benzoyl peroxide 10 % EX external gel Apply topically 2 times daily (Patient not taking: Reported on 6/19/2020) 90 g 3     metroNIDAZOLE (METROGEL) 0.75 % external gel Apply topically 2 times daily (Patient not taking: Reported on 6/19/2020) 45 g 1     montelukast (SINGULAIR) 10 MG tablet TAKE ONE TABLET BY MOUTH AT BEDTIME (Patient not taking: Reported on 7/2/2020) 90 tablet 0     nicotine (EQ NICOTINE POLACRILEX) 2 MG lozenge Place 1 lozenge (2 mg) inside cheek every hour as needed for smoking cessation (Patient not taking: Reported on 6/19/2020) 360 lozenge 1     nicotine (NICORETTE) 2 MG gum Place 1 each (2 mg) inside cheek as needed for smoking cessation (Patient not taking:  Reported on 6/19/2020) 150 each 1     nicotine 14 MG/24HR TD 24 hr patch Place 1 patch onto the skin every 24 hours (Patient not taking: Reported on 6/19/2020) 30 patch 1     nitroGLYcerin (NITROSTAT) 0.4 MG sublingual tablet FOR CHEST PAIN PLACE ONE TABLET UNDER THE TONGUE EVERY 5 MINUTES FOR 3 DOSES.  IF SYPTOMS PERSIST 5 MINUTES AFTER 1ST DOSE CALL 911 (Patient not taking: Reported on 6/19/2020) 25 tablet 0     nystatin 018243 UNIT/GM EX external powder Apply topically 2 times daily as needed (skin rash) (Patient not taking: Reported on 7/2/2020) 60 g 11        Allergies   Allergen Reactions     Codeine Nausea and Vomiting     vomiting     Cyclobenzaprine Nausea     Hydrocodone Nausea and Vomiting     Sulfa Drugs Nausea and Vomiting     Nausea     Gabapentin Rash         Review of Systems:  -Skin/Heme New Pt: The patient denies frequent sun exposure. The patient denies excessive scarring or problems healing except as per HPI. The patient denies excessive bleeding.  -Constitutional: Otherwise feeling well today, in usual state of health.  -HEENT: Patient denies nonhealing oral sores.  -Skin: As above in HPI. No additional skin concerns.    Physical exam:  Vitals: /75 (BP Location: Left arm)   Pulse 87   LMP 04/15/2016 (Exact Date)   GEN: This is a well developed, well-nourished female in no acute distress, in a pleasant mood.    SKIN: Focused examination of the face,  was performed.  -Albarran skin type: II  -Soft abscess on right upper thigh with overlying violaceous erythema  -Smaller abscess with overlying scar on left lower pannus  -Pink scarred papule under right pannus  - Scarred tracts under pannus, under left breast  -Scattered inflammatory scarred papules on right lower abdomen  -R and L axillae clear  -Scarred papule under left breast  -No other lesions of concern on areas examined.     Impression/Plan:  1. Hidradenitis suppurativa, Parra stage II, flaring. Doxycycline used to work, but  recently she has notice decreased efficacy. We discussed alternative medications including minocycline and Bactrim (she has allergy listed, but states it was only nausea and she would be willing to try it again). She really would like these lesions excised, but would not like anything under local anesthesia. Of note, prednisone really helps her HS flares.  -Stop doxycycline  -Start Bactrim DS BID  -Prednisone taper 20 mg x 7 days, 10 mg x 7 days for flare  -Consider surgical intervention in future, but patient would only like general anesthesia.  -Stop hibiclens and switch to BPO wash    CC Roro Chawla MD  [address] [place] [address] on close of this encounter.     Follow-up in 3 months, earlier for new or changing lesions.      staffed the patient.    Staff Involved:  Resident(Сергей Newell)/Staff    Сергей Newell MD  Dermatology Resident, PGY-3    Staff Physician Comments:   I saw and evaluated the patient with the resident and I agree with the assessment and plan.  I was present for the examination.    Lamont Jordan MD, FAAD    Departments of Internal Medicine and Dermatology  [facility]  [phone]

## 2020-07-02 NOTE — PATIENT INSTRUCTIONS
1. Stop doxycyline  2. Stop hibiclens  3. Start benzoyl peroxide wash in place of hibiclens. You can buy this over the counter  4. We will start TMP-SMX (Bactrim) twice daily.  5. When you are done flaring, we can discuss excising the spots.

## 2020-07-02 NOTE — TELEPHONE ENCOUNTER
Pending Prescriptions:                       Disp   Refills    desonide (DESOWEN) 0.05 % external cream   60 g   0        Sig: Apply topically as needed (rash)

## 2020-07-03 ENCOUNTER — TELEPHONE (OUTPATIENT)
Dept: ANTICOAGULATION | Facility: CLINIC | Age: 50
End: 2020-07-03

## 2020-07-03 ENCOUNTER — TELEPHONE (OUTPATIENT)
Dept: INTERNAL MEDICINE | Facility: CLINIC | Age: 50
End: 2020-07-03

## 2020-07-03 DIAGNOSIS — K21.00 GASTROESOPHAGEAL REFLUX DISEASE WITH ESOPHAGITIS: ICD-10-CM

## 2020-07-03 NOTE — TELEPHONE ENCOUNTER
"Pharmacy comments:    \"Can we get Omeprazole 20mg capsules? Patient has trouble swallowing the tablets. Thank you.\"  " · Assessment		  		  65 year old female w hx of Crohns disease, recent hx of perforated colitis/diverticulitis complicated by abscess s/p surgical drainage 1 week ago at [place] and diversion surgery ( Yousif's procedure), HTN, lower GI bleed was sent to the ED from [facility] for anemia and weakness for 1 day.    IMPRESSION;   #Intraabdominal abscess  CT Abdomen 6/21; 3.9 x 3 x 8.3 perisplenic collection  S/p 6/24 Anterior malathi-splenic collection drainage with ROSIE placement with  24cc serous brown fluid evacuated, with appreciable clearing of perisplenic collection.  Abscess cultures 6/24 VRE  BCx 6/21 NG  UCx 6/21 Kleb ESBL ( colonizer ) later with Acinetobacter ( colonizer )  #Abdominal wound : no abscess/cellulitis. Not infected    RECOMMENDATIONS;   TGC 50 mg iv q12h  d/c other ABx  f/u with Dr Cherry [phone] at [phone] ( will call pt at home )

## 2020-07-03 NOTE — TELEPHONE ENCOUNTER
"Called to schedule appointment for patients father, when that was completed, patient requested an appointment for herself.    Patient reports her arms and legs are hurting again, same as they did about 6 months ago, patient did see PCP for this but it looks like that was about 4 months ago. Patient reports she is \"exhausted, even gets exhausted from chewing, unable to walk to the store\" Appointment scheduled.  Dianelys Rosen RN    "

## 2020-07-04 RX ORDER — DESONIDE 0.5 MG/G
CREAM TOPICAL PRN
Qty: 60 G | Refills: 0 | Status: ON HOLD | OUTPATIENT
Start: 2020-07-04 | End: 2020-11-20

## 2020-07-10 ENCOUNTER — VIRTUAL VISIT (OUTPATIENT)
Dept: INTERNAL MEDICINE | Facility: CLINIC | Age: 50
End: 2020-07-10
Payer: COMMERCIAL

## 2020-07-10 DIAGNOSIS — J44.1 COPD EXACERBATION (H): Primary | ICD-10-CM

## 2020-07-10 PROCEDURE — 99214 OFFICE O/P EST MOD 30 MIN: CPT | Mod: 95 | Performed by: INTERNAL MEDICINE

## 2020-07-10 RX ORDER — LEVOFLOXACIN 500 MG/1
500 TABLET, FILM COATED ORAL DAILY
Qty: 7 TABLET | Refills: 0 | Status: SHIPPED | OUTPATIENT
Start: 2020-07-10 | End: 2022-10-07

## 2020-07-10 NOTE — PATIENT INSTRUCTIONS
Plan:  1. Levaquin 500 mg daily - antibiotic  2. If the POx doesn't go above 90 you need to go to ER  3. If ant joints ache - stop the Levaquin   4. If not better you will need to go to ER

## 2020-07-10 NOTE — PROGRESS NOTES
"Swetha Patterson is a 50 year old female who is being evaluated via a billable video visit.      The patient has been notified of following:     \"This video visit will be conducted via a call between you and your physician/provider. We have found that certain health care needs can be provided without the need for an in-person physical exam.  This service lets us provide the care you need with a video conversation.  If a prescription is necessary we can send it directly to your pharmacy.  If lab work is needed we can place an order for that and you can then stop by our lab to have the test done at a later time.    Video visits are billed at different rates depending on your insurance coverage.  Please reach out to your insurance provider with any questions.    If during the course of the call the physician/provider feels a video visit is not appropriate, you will not be charged for this service.\"    Patient has given verbal consent for Video visit? Yes  How would you like to obtain your AVS? Jacky  Patient would like the video invitation sent by: Text to cell phone: .  Will anyone else be joining your video visit? No              This is a VIDEO ( using Doximity)  encounter with the patient.       Location of the provider : office  Location of the patient : home      14:58  --- 15:20          Dr Low's note      Patient's instructions / PLAN:                                                        Plan:  1. Levaquin 500 mg daily - antibiotic  2. If the POx doesn't go above 90 you need to go to ER  3. If ant joints ache - stop the Levaquin   4. If not better you will need to go to ER      ASSESSMENT & PLAN:                                                      (J44.1) COPD exacerbation (H)  (primary encounter diagnosis)  Comment: We discussed about the new meds, advantages and potential side effects. The patient will read also the info from the pharmacy and call back if questions.   She has tried Levaquin in the " past with no side effects.  Plan: levofloxacin (LEVAQUIN) 500 MG tablet        Continue inhalers and nebulizers       Chief complaint:                                                      SOB    SUBJECTIVE:                                                         SOB  -- feels her chest very heavy  -- Nona hasn't   -- she is taking Prednisone and Bactrim  ( prescribed by dermato )  -- feels tired   -- slept 15 h yesterday   -- takes the nebs     Hidradenitis supurativa     POx 88 %     Hyperlipidemia Follow-Up      Are you regularly taking any medication or supplement to lower your cholesterol?   Yes- lipitor    Are you having muscle aches or other side effects that you think could be caused by your cholesterol lowering medication?  No    Hypertension Follow-up      Do you check your blood pressure regularly outside of the clinic? Yes     Are you following a low salt diet? Yes    Are your blood pressures ever more than 140 on the top number (systolic) OR more   than 90 on the bottom number (diastolic), for example 140/90? No         How many servings of fruits and vegetables do you eat daily?  2-3    On average, how many sweetened beverages do you drink each day (Examples: soda, juice, sweet tea, etc.  Do NOT count diet or artificially sweetened beverages)?   0    How many days per week do you exercise enough to make your heart beat faster? 7    How many minutes a day do you exercise enough to make your heart beat faster? 30 - 60    How many days per week do you miss taking your medication? 0    Review of Systems:                                                      ROS: negative for fever, chills, cough, wheezes, chest pain, shortness of breath, vomiting, abdominal pain, leg swelling pos for SOB, cough, as above         OBJECTIVE:           An actual physical exam can't be done during phone visit   A limited exam can sometimes be performed by video visit       PMHx: reviewed  Past Medical History:   Diagnosis Date  "    Anxiety state, unspecified      Asthma      Chronic pain     back pain from cyst     Contact dermatitis and other eczema, due to unspecified cause      COPD (chronic obstructive pulmonary disease) (H)      Depressive disorder, not elsewhere classified      Emphysema with chronic bronchitis (H)      Esophageal reflux      Family history of ischemic heart disease      Gastro-oesophageal reflux disease      History of emphysema      Hoarseness      HTN, goal below 140/90      Hyperlipidemia LDL goal <130      Liver disease     \"fatty liver\"     Other chronic pain     chest, from coughing     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)      PONV (postoperative nausea and vomiting)       PSHx: reviewed  Past Surgical History:   Procedure Laterality Date     ARTHROSCOPY SHOULDER DECOMPRESSION Left 10/21/2015    Procedure: ARTHROSCOPY SHOULDER DECOMPRESSION;  Surgeon: Julien Milian MD;  Location: [facility]     BIOPSY ARTERY TEMPORAL Left 3/11/2020    Procedure: LEFT TEMPORAL ARTERY BIOPSY;  Surgeon: Ibeth Conner MD;  Location: [facility]     BRONCHIAL THERMOPLASTY N/A 11/14/2014    Procedure: BRONCHIAL THERMOPLASTY;  Surgeon: Ward Whitaker MD;  [facility]: [facility]     BRONCHIAL THERMOPLASTY N/A 12/19/2014    Procedure: BRONCHIAL THERMOPLASTY;  Surgeon: Ward Whitaker MD;  [facility]     BRONCHIAL THERMOPLASTY N/A 2/6/2015    Procedure: BRONCHIAL THERMOPLASTY;  Surgeon: Ward Whitaker MD;  [facility]     C APPENDECTOMY  at age 17     COLONOSCOPY N/A 11/26/2018    Procedure: COLONOSCOPY (Formerly [facility]);  Surgeon: Marshall Oakes MD;  Location: [facility]     DISCECTOMY, FUSION CERVICAL ANTERIOR ONE LEVEL, COMBINED N/A 5/1/2018    Procedure: COMBINED DISCECTOMY, FUSION CERVICAL ANTERIOR ONE LEVEL;  1.  C5-C6 anterior cervical diskectomy and fusion.    2.  C5-C6 application of intervertebral biomechanical device for interbody fusion purposes.    3.  C5-C6 anterior instrumentation using the standard " Kristin InViZia 24 mm plate with four associated bone screws, 12 mm in length.  Inferior screws are fixed.  Superior screws are va     ENT SURGERY  2015    polyps removed from vocal cords      EXCISE NODE MEDIASTINAL  4/26/2013    Procedure: EXCISE NODE MEDIASTINAL;;  Surgeon: Av Peña MD;  Location: [facility] OR     LAPAROSCOPIC CHOLECYSTECTOMY N/A 10/19/2017    Procedure: LAPAROSCOPIC CHOLECYSTECTOMY;  LAPAROSCOPIC CHOLECYSTECTOMY;  Surgeon: Ney Jerry MD;  Location: [facility]     THORACOSCOPY  4/26/2013    Procedure: THORACOSCOPY;  LEFT VIDEO ASSISTED THORACOSCOPY, RESECTION OF POSTERIOR MEDIASTINAL MASS;  Surgeon: Av Peña MD;  Location:  OR     TONSILLECTOMY  as a kid     TONSILLECTOMY          Meds: reviewed  Current Outpatient Medications   Medication Sig Dispense Refill     acetaminophen (TYLENOL) 325 MG tablet Take 3 tablets (975 mg) by mouth every 6 hours as needed for pain 50 tablet 0     albuterol (PROAIR HFA/PROVENTIL HFA/VENTOLIN HFA) 108 (90 Base) MCG/ACT inhaler Inhale 2 puffs into the lungs every 6 hours as needed for shortness of breath / dyspnea 18 g 3     albuterol (PROVENTIL) (2.5 MG/3ML) 0.083% neb solution Take 1 vial (2.5 mg) by nebulization every 6 hours as needed for shortness of breath / dyspnea or wheezing 25 vial 3     amLODIPine (NORVASC) 5 MG tablet Take 1 tablet (5 mg) by mouth daily 90 tablet 0     atorvastatin (LIPITOR) 80 MG tablet Take 1 tablet (80 mg) by mouth daily 90 tablet 0     benzoyl peroxide 10 % EX external gel Apply topically 2 times daily 90 g 3     blood glucose (NO BRAND SPECIFIED) lancets standard Use to test blood sugar 1 time daily 100 each 1     blood glucose (NO BRAND SPECIFIED) test strip Use to test blood sugar 1 times daily or as directed. 100 strip 0     blood glucose monitoring (NO BRAND SPECIFIED) meter device kit Use to test blood sugar 1 times daily or as directed. 1 kit 0     budesonide-formoterol (SYMBICORT) 160-4.5 MCG/ACT  IN Inhaler Inhale 2 puffs into the lungs 2 times daily 3 Inhaler 1     buPROPion 150 MG PO 24 hr tablet Take 1 tablet (150 mg) by mouth every morning 90 tablet 1     cetirizine HCl 10 MG CAPS Take 1 capsule (10 mg) by mouth daily as needed Takes in the spring. 90 capsule 3     clindamycin 1 % EX external gel Apply topically 2 times daily 60 g 3     desonide (DESOWEN) 0.05 % external cream Apply topically as needed (rash) 60 g 0     fluticasone 50 MCG/ACT NA nasal spray Spray 2 sprays in nostril daily 16 g 5     furosemide (LASIX) 20 MG tablet Take 1 tablet (20 mg) by mouth daily 90 tablet 0     hydrOXYzine (ATARAX) 50 MG tablet Take 2 tablets (100 mg) by mouth 3 times daily 180 tablet 3     ibuprofen (ADVIL/MOTRIN) 600 MG tablet Take 1 tablet (600 mg) by mouth every 6 hours as needed for pain 30 tablet 0     lisinopril (ZESTRIL) 20 MG tablet Take 1 tablet (20 mg) by mouth daily 90 tablet 0     metFORMIN (GLUCOPHAGE) 500 MG tablet Take 1 tablet (500 mg) by mouth daily (with breakfast) 90 tablet 0     metroNIDAZOLE (METROGEL) 0.75 % external gel Apply topically 2 times daily 45 g 1     montelukast (SINGULAIR) 10 MG tablet TAKE ONE TABLET BY MOUTH AT BEDTIME 90 tablet 0     nicotine (EQ NICOTINE POLACRILEX) 2 MG lozenge Place 1 lozenge (2 mg) inside cheek every hour as needed for smoking cessation 360 lozenge 1     nicotine (NICORETTE) 2 MG gum Place 1 each (2 mg) inside cheek as needed for smoking cessation 150 each 1     nicotine 14 MG/24HR TD 24 hr patch Place 1 patch onto the skin every 24 hours 30 patch 1     nitroGLYcerin (NITROSTAT) 0.4 MG sublingual tablet FOR CHEST PAIN PLACE ONE TABLET UNDER THE TONGUE EVERY 5 MINUTES FOR 3 DOSES.  IF SYPTOMS PERSIST 5 MINUTES AFTER 1ST DOSE CALL 911 25 tablet 0     nystatin 671382 UNIT/GM EX external powder Apply topically 2 times daily as needed (skin rash) 60 g 11     omeprazole (PRILOSEC) 20 MG DR capsule Take 1 capsule (20 mg) by mouth 2 times daily 180 capsule 1      omeprazole 20 MG tablet Take 1 tablet (20 mg) by mouth 2 times daily 180 tablet 1     ondansetron (ZOFRAN) 8 MG tablet Take 0.5-1 tablets (4-8 mg) by mouth every 8 hours as needed for nausea 30 tablet 1     order for DME Equipment being ordered: Digital home blood pressure monitor kit 1 Device 0     order for DME Equipment being ordered: Pulse Oxymeter 1 Device 0     predniSONE (DELTASONE) 10 MG tablet Take 2 tablets (20 mg) by mouth daily for 7 days, THEN 1 tablet (10 mg) daily for 7 days. 21 tablet 0     predniSONE (DELTASONE) 20 MG tablet Take 1 tablet (20 mg) by mouth daily 7 tablet 0     senna-docusate (SENOKOT-S/PERICOLACE) 8.6-50 MG tablet Take 1-2 tablets by mouth 2 times daily Use while taking narcotic pain medication to prevent constipation. 10 tablet 0     sulfamethoxazole-trimethoprim (BACTRIM DS) 800-160 MG tablet Take 1 tablet by mouth 2 times daily Take one tablet twice daily. 60 tablet 3     tiotropium (SPIRIVA HANDIHALER) 18 MCG IN inhaled capsule Inhale contents of one capsule daily. 90 capsule 3     doxycycline hyclate (VIBRA-TABS) 100 MG tablet Take 1 tablet (100 mg) by mouth 2 times daily (Patient not taking: Reported on 7/10/2020) 180 tablet 0     varenicline (CHANTIX) 1 MG tablet Take 1 tablet (1 mg) by mouth 2 times daily (Patient not taking: Reported on 7/10/2020) 60 tablet 3       Soc Hx: reviewed  Fam Hx: reviewed          Roro Low MD  Internal Medicine

## 2020-07-14 ENCOUNTER — VIRTUAL VISIT (OUTPATIENT)
Dept: INTERNAL MEDICINE | Facility: CLINIC | Age: 50
End: 2020-07-14
Payer: COMMERCIAL

## 2020-07-14 DIAGNOSIS — L73.2 HIDRADENITIS SUPPURATIVA: ICD-10-CM

## 2020-07-14 DIAGNOSIS — J44.9 COPD, MODERATE (H): Primary | ICD-10-CM

## 2020-07-14 DIAGNOSIS — R73.03 PREDIABETES: ICD-10-CM

## 2020-07-14 DIAGNOSIS — R53.83 OTHER FATIGUE: ICD-10-CM

## 2020-07-14 PROCEDURE — 99214 OFFICE O/P EST MOD 30 MIN: CPT | Mod: 95 | Performed by: INTERNAL MEDICINE

## 2020-07-14 RX ORDER — GLIPIZIDE 2.5 MG/1
2.5 TABLET, EXTENDED RELEASE ORAL DAILY
Qty: 30 TABLET | Refills: 1 | Status: SHIPPED | OUTPATIENT
Start: 2020-07-14 | End: 2020-09-30

## 2020-07-14 NOTE — PROGRESS NOTES
"Swetha Patterson is a 50 year old female who is being evaluated via a billable video visit.      The patient has been notified of following:     \"This video visit will be conducted via a call between you and your physician/provider. We have found that certain health care needs can be provided without the need for an in-person physical exam.  This service lets us provide the care you need with a video conversation.  If a prescription is necessary we can send it directly to your pharmacy.  If lab work is needed we can place an order for that and you can then stop by our lab to have the test done at a later time.    Video visits are billed at different rates depending on your insurance coverage.  Please reach out to your insurance provider with any questions.    If during the course of the call the physician/provider feels a video visit is not appropriate, you will not be charged for this service.\"    Patient has given verbal consent for Video visit? Yes  How would you like to obtain your AVS? Jacky  Patient would like the video invitation sent by: Text to cell phone: [phone]  Will anyone else be joining your video visit? No        This is a VIDEO ( using Doximity)  encounter with the patient.       Location of the provider : office  Location of the patient : home        11:10 --- 11:31          Dr Low's note      Patient's instructions / PLAN:                                                        Plan:  1. Try 2 tablets metformin daily in the morning if you can tolerate  2. After you finish Levaquin start Glipizide 2.5 mg daily   3. Continue the other meds, same doses for now.  4. Please make a lab appointment for fasting labs in 3 - 4 weeks   5. Please make a video  appointment few days after the labs to discuss about the results.         ASSESSMENT & PLAN:                                                      (J44.9) COPD, moderate (H)  (primary encounter diagnosis)  Comment: stable   Plan: Continue same " meds, same doses for now     (L73.2) Hidradenitis suppurativa - severe not responsive to meds   Comment:   Plan:     (R73.03) Prediabetes  Comment:   Plan: glipiZIDE (GLUCOTROL XL) 2.5 MG 24 hr tablet,         Comprehensive metabolic panel, Hemoglobin A1c               Chief complaint:                                                      Follow up chronic medical problems      SUBJECTIVE:                                                    History of present illness:    Cough/COPD   -- little better after Levaquin. She tolerates it well    R LBP  -- worse with movements   -- no urine symptoms   -- hx of kideny stone, but this pain doesn't feel like one     Hidradenitis   -- persists. She thinks it is less inflammed since she takes the abx ( scheduled or 2 months)   --     Anxiety   -- stable, f/u w psychiatris    DM  --  in am and 170 in the afternoon  -- takes Metformin 500 mg. She needs to take Zofran before she takes it because it causes nausea  -- glipizide can interact w Levaquin     LOV: Plan:  1. Levaquin 500 mg daily - antibiotic  2. If the POx doesn't go above 90 you need to go to ER  3. If ant joints ache - stop the Levaquin   4. If not better you will need to go to ER      Review of Systems:                                                      ROS: negative for fever, chills,  wheezes, chest pain,  vomiting, abdominal pain, leg swelling Pos for chr cough and sob    A 10-point review of systems was obtained.  Those pertinent are above and in the in the Subjective section.  The rest of the systems are negative.           OBJECTIVE:           An actual physical exam can't be done during phone visit   A limited exam can sometimes be performed by video visit       PMHx: reviewed  Past Medical History:   Diagnosis Date     Anxiety state, unspecified      Asthma      Chronic pain     back pain from cyst     Contact dermatitis and other eczema, due to unspecified cause      COPD (chronic obstructive  "pulmonary disease) (H)      Depressive disorder, not elsewhere classified      Emphysema with chronic bronchitis (H)      Esophageal reflux      Family history of ischemic heart disease      Gastro-oesophageal reflux disease      History of emphysema      Hoarseness      HTN, goal below 140/90      Hyperlipidemia LDL goal <130      Liver disease     \"fatty liver\"     Other chronic pain     chest, from coughing     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)      PONV (postoperative nausea and vomiting)       PSHx: reviewed  Past Surgical History:   Procedure Laterality Date     ARTHROSCOPY SHOULDER DECOMPRESSION Left 10/21/2015    Procedure: ARTHROSCOPY SHOULDER DECOMPRESSION;  Surgeon: Julien Milian MD;  [facility]: [facility]     BIOPSY ARTERY TEMPORAL Left 3/11/2020    Procedure: LEFT TEMPORAL ARTERY BIOPSY;  Surgeon: Ibeth Conner MD;  Location: [facility]     BRONCHIAL THERMOPLASTY N/A 11/14/2014    Procedure: BRONCHIAL THERMOPLASTY;  Surgeon: Ward Whitaker MD;  [facility]     BRONCHIAL THERMOPLASTY N/A 12/19/2014    Procedure: BRONCHIAL THERMOPLASTY;  Surgeon: Ward Whitaker MD;  [facility]     BRONCHIAL THERMOPLASTY N/A 2/6/2015    Procedure: BRONCHIAL THERMOPLASTY;  Surgeon: Ward Whitaker MD;  [facility]     C APPENDECTOMY  at age 17     COLONOSCOPY N/A 11/26/2018    Procedure: COLONOSCOPY ([facility]);  Surgeon: Marshall Oakes MD;  [facility]: [facility]     DISCECTOMY, FUSION CERVICAL ANTERIOR ONE LEVEL, COMBINED N/A 5/1/2018    Procedure: COMBINED DISCECTOMY, FUSION CERVICAL ANTERIOR ONE LEVEL;  1.  C5-C6 anterior cervical diskectomy and fusion.    2.  C5-C6 application of intervertebral biomechanical device for interbody fusion purposes.    3.  C5-C6 anterior instrumentation using the standard Kristin InViZia 24 mm plate with four associated bone screws, 12 mm in length.  Inferior screws are fixed.  Superior screws are va     ENT SURGERY  2015    polyps removed from vocal " cords      EXCISE NODE MEDIASTINAL  4/26/2013    Procedure: EXCISE NODE MEDIASTINAL;;  Surgeon: Av Peña MD;  Location: [facility]     LAPAROSCOPIC CHOLECYSTECTOMY N/A 10/19/2017    Procedure: LAPAROSCOPIC CHOLECYSTECTOMY;  LAPAROSCOPIC CHOLECYSTECTOMY;  Surgeon: Ney Jerry MD;  [facility]: [facility]     THORACOSCOPY  4/26/2013    Procedure: THORACOSCOPY;  LEFT VIDEO ASSISTED THORACOSCOPY, RESECTION OF POSTERIOR MEDIASTINAL MASS;  Surgeon: Av Peña MD;  Location: [facility]     TONSILLECTOMY  as a kid     TONSILLECTOMY          Meds: reviewed  Current Outpatient Medications   Medication Sig Dispense Refill     acetaminophen (TYLENOL) 325 MG tablet Take 3 tablets (975 mg) by mouth every 6 hours as needed for pain 50 tablet 0     albuterol (PROAIR HFA/PROVENTIL HFA/VENTOLIN HFA) 108 (90 Base) MCG/ACT inhaler Inhale 2 puffs into the lungs every 6 hours as needed for shortness of breath / dyspnea 18 g 3     albuterol (PROVENTIL) (2.5 MG/3ML) 0.083% neb solution Take 1 vial (2.5 mg) by nebulization every 6 hours as needed for shortness of breath / dyspnea or wheezing 25 vial 3     amLODIPine (NORVASC) 5 MG tablet Take 1 tablet (5 mg) by mouth daily 90 tablet 0     atorvastatin (LIPITOR) 80 MG tablet Take 1 tablet (80 mg) by mouth daily 90 tablet 0     benzoyl peroxide 10 % EX external gel Apply topically 2 times daily 90 g 3     blood glucose (NO BRAND SPECIFIED) lancets standard Use to test blood sugar 1 time daily 100 each 1     blood glucose (NO BRAND SPECIFIED) test strip Use to test blood sugar 1 times daily or as directed. 100 strip 0     blood glucose monitoring (NO BRAND SPECIFIED) meter device kit Use to test blood sugar 1 times daily or as directed. 1 kit 0     budesonide-formoterol (SYMBICORT) 160-4.5 MCG/ACT IN Inhaler Inhale 2 puffs into the lungs 2 times daily 3 Inhaler 1     buPROPion 150 MG PO 24 hr tablet Take 1 tablet (150 mg) by mouth every morning 90 tablet 1     cetirizine HCl  10 MG CAPS Take 1 capsule (10 mg) by mouth daily as needed Takes in the spring. 90 capsule 3     clindamycin 1 % EX external gel Apply topically 2 times daily 60 g 3     desonide (DESOWEN) 0.05 % external cream Apply topically as needed (rash) 60 g 0     doxycycline hyclate (VIBRA-TABS) 100 MG tablet Take 1 tablet (100 mg) by mouth 2 times daily 180 tablet 0     fluticasone 50 MCG/ACT NA nasal spray Spray 2 sprays in nostril daily 16 g 5     furosemide (LASIX) 20 MG tablet Take 1 tablet (20 mg) by mouth daily 90 tablet 0     hydrOXYzine (ATARAX) 50 MG tablet Take 2 tablets (100 mg) by mouth 3 times daily 180 tablet 3     ibuprofen (ADVIL/MOTRIN) 600 MG tablet Take 1 tablet (600 mg) by mouth every 6 hours as needed for pain 30 tablet 0     levofloxacin (LEVAQUIN) 500 MG tablet Take 1 tablet (500 mg) by mouth daily for 7 days 7 tablet 0     lisinopril (ZESTRIL) 20 MG tablet Take 1 tablet (20 mg) by mouth daily 90 tablet 0     metFORMIN (GLUCOPHAGE) 500 MG tablet Take 1 tablet (500 mg) by mouth daily (with breakfast) 90 tablet 0     metroNIDAZOLE (METROGEL) 0.75 % external gel Apply topically 2 times daily 45 g 1     montelukast (SINGULAIR) 10 MG tablet TAKE ONE TABLET BY MOUTH AT BEDTIME 90 tablet 0     nicotine (EQ NICOTINE POLACRILEX) 2 MG lozenge Place 1 lozenge (2 mg) inside cheek every hour as needed for smoking cessation 360 lozenge 1     nicotine (NICORETTE) 2 MG gum Place 1 each (2 mg) inside cheek as needed for smoking cessation 150 each 1     nicotine 14 MG/24HR TD 24 hr patch Place 1 patch onto the skin every 24 hours 30 patch 1     nitroGLYcerin (NITROSTAT) 0.4 MG sublingual tablet FOR CHEST PAIN PLACE ONE TABLET UNDER THE TONGUE EVERY 5 MINUTES FOR 3 DOSES.  IF SYPTOMS PERSIST 5 MINUTES AFTER 1ST DOSE CALL 911 25 tablet 0     nystatin 019362 UNIT/GM EX external powder Apply topically 2 times daily as needed (skin rash) 60 g 11     omeprazole (PRILOSEC) 20 MG DR capsule Take 1 capsule (20 mg) by mouth 2  times daily 180 capsule 1     omeprazole 20 MG tablet Take 1 tablet (20 mg) by mouth 2 times daily 180 tablet 1     ondansetron (ZOFRAN) 8 MG tablet Take 0.5-1 tablets (4-8 mg) by mouth every 8 hours as needed for nausea 30 tablet 1     order for DME Equipment being ordered: Digital home blood pressure monitor kit 1 Device 0     order for DME Equipment being ordered: Pulse Oxymeter 1 Device 0     predniSONE (DELTASONE) 10 MG tablet Take 2 tablets (20 mg) by mouth daily for 7 days, THEN 1 tablet (10 mg) daily for 7 days. 21 tablet 0     predniSONE (DELTASONE) 20 MG tablet Take 1 tablet (20 mg) by mouth daily 7 tablet 0     senna-docusate (SENOKOT-S/PERICOLACE) 8.6-50 MG tablet Take 1-2 tablets by mouth 2 times daily Use while taking narcotic pain medication to prevent constipation. 10 tablet 0     sulfamethoxazole-trimethoprim (BACTRIM DS) 800-160 MG tablet Take 1 tablet by mouth 2 times daily Take one tablet twice daily. 60 tablet 3     tiotropium (SPIRIVA HANDIHALER) 18 MCG IN inhaled capsule Inhale contents of one capsule daily. 90 capsule 3     varenicline (CHANTIX) 1 MG tablet Take 1 tablet (1 mg) by mouth 2 times daily 60 tablet 3       Soc Hx: reviewed  Fam Hx: reviewed          Roro Low MD  Internal Medicine

## 2020-07-14 NOTE — PATIENT INSTRUCTIONS
Plan:  1. Try 2 tablets metformin daily   in the morning if you can tolerate  2. After you finish Levaquin   start Glipizide 2.5 mg daily   3. Continue the other meds, same doses for now.  4. Please make a lab appointment   for non fasting labs in 3 - 4 weeks   5. Please make a video  appointment   few days after the labs to discuss about the results.       For info about how to use My Chart: call   [employer] Help Line [phone]   [employer] Log-on Page: [email or web address]  MyChart [facility] Page: [email or web address]

## 2020-07-17 ENCOUNTER — TELEPHONE (OUTPATIENT)
Dept: INTERNAL MEDICINE | Facility: CLINIC | Age: 50
End: 2020-07-17

## 2020-07-17 DIAGNOSIS — J44.9 COPD, MODERATE (H): Primary | ICD-10-CM

## 2020-07-17 NOTE — TELEPHONE ENCOUNTER
Patient calling stating she has an URI and was prescribed antibiotics and stated that the medication isn't working and would to have the z jose alberto instead.  Patient also wanting to know if primary care provider wants her taking prednisone.  Patient denied fever.    Call back at [phone] - ok to leave detailed message.

## 2020-07-17 NOTE — TELEPHONE ENCOUNTER
Patient had virtual visit 7/10 and 7/14. Call to patient. States she finished Levaquin today. States she does not feel like the Levaquin helped at all. States she is coughing a lot. Please advise.

## 2020-07-20 NOTE — TELEPHONE ENCOUNTER
"Call to patient. States she feels \"like shit\". Patient wonders if she has throat polyps again. States she had throat cancer in the past. States when she blows her nose it is yellow. States the issue is not in her lungs. It is in the back of her throat. States she doesn't feel like going anywhere to she will just wait and see what happens. Offered virtual visit this week or in clinic appointment next week. Patient declined.   "

## 2020-07-24 DIAGNOSIS — F41.9 ANXIETY: ICD-10-CM

## 2020-07-30 RX ORDER — HYDROXYZINE HYDROCHLORIDE 50 MG/1
TABLET, FILM COATED ORAL
Qty: 180 TABLET | Refills: 0 | Status: SHIPPED | OUTPATIENT
Start: 2020-07-30 | End: 2020-09-11

## 2020-07-31 ENCOUNTER — OFFICE VISIT (OUTPATIENT)
Dept: PULMONOLOGY | Facility: CLINIC | Age: 50
End: 2020-07-31
Attending: OPTOMETRIST
Payer: COMMERCIAL

## 2020-07-31 ENCOUNTER — ANCILLARY PROCEDURE (OUTPATIENT)
Dept: GENERAL RADIOLOGY | Facility: CLINIC | Age: 50
End: 2020-07-31
Attending: INTERNAL MEDICINE

## 2020-07-31 VITALS
WEIGHT: 225 LBS | SYSTOLIC BLOOD PRESSURE: 93 MMHG | OXYGEN SATURATION: 97 % | HEIGHT: 63 IN | HEART RATE: 80 BPM | RESPIRATION RATE: 17 BRPM | DIASTOLIC BLOOD PRESSURE: 60 MMHG | BODY MASS INDEX: 39.87 KG/M2

## 2020-07-31 DIAGNOSIS — J44.9 COPD, MODERATE (H): ICD-10-CM

## 2020-07-31 DIAGNOSIS — J45.40 MODERATE PERSISTENT ASTHMA, UNSPECIFIED WHETHER COMPLICATED: Primary | ICD-10-CM

## 2020-07-31 PROCEDURE — G0463 HOSPITAL OUTPT CLINIC VISIT: HCPCS | Mod: ZF

## 2020-07-31 ASSESSMENT — PAIN SCALES - GENERAL: PAINLEVEL: EXTREME PAIN (8)

## 2020-07-31 ASSESSMENT — MIFFLIN-ST. JEOR: SCORE: 1604.72

## 2020-07-31 NOTE — PROGRESS NOTES
[facility]    Pulmonary, Allergy, Critical Care and Sleep Medicine    Initial Consultation  07/31/2020    Swetha Patterson MRN# [id number]   Age: 50 year old YOB: 1970     Primary care provider: Roro Chawla     Assessment and Recommendations:    Swetha Patterson is a 50 year old female with a history of asthma s/p bronchial thermoplasty 2015, tobacco use, vocal cord nodules, diabetes, hypertension, and anxiety, who presents for evaluation of asthma with worsening cough and dyspnea.    Asthma  Dyspnea on Exertion  History of adult onset asthma, possibly related to work as a . Exacerbated in past by ongoing smoking and exposure to second hand smoke in her home. Recently quit smoking and trying to have family members smoke outside. Is wheezy on exam though PFTs and CXR normal today. Has been on full inhaler therapy in past, not sure if totally compliant, sounds like has gone between Symbicort and Trelegy in past. Discussed that Symbicort does not contain all the medications Trelegy does (and was not using Spiriva anymore). Will use Trelegy along with albuterol nebs and inhaler. Will switch Flonase to evening since that is when symptoms are greater. IgE normal years ago, will check again today to ensure not a candidate for biologic therapy, eosinophils never elevated. Mucus reportedly thicker in recent months and will start Aerobika with the nebs. Would benefit from greater physical activity and will refer to Pulmonary Rehab.  - Continue Trelegy inhaler  - Start using Flonase at night rather than in the morning  - Aerobika and albuterol nebs BID for airway clearance  - Check IgE level today  - Referral to Pulmonary Rehab  - Congratulated on quitting smoking, continue with Chantix and nicotine replacement  - Will help with paperwork for home oximeter    Vocal Cord Nodules  History of vocal cord nodules with prior treatments by ENT, did not complete  a recommended course of Speech Therapy. Having worsening symptoms of voice hoarseness and throat irritation. Unclear if new lesions on vocal cord and will have follow up with ENT for repeat laryngoscopy. Does have an area of swelling around base of left neck. Mostly non-tender, no clear masses. Have asked she discuss with ENT to see if they recommend neck imaging.   - Will help arrange for ENT follow up  - Defer to ENT on any imaging of neck    Fatigue, Leg Weakness  Ongoing fatigue, leg weakness, jaw symptoms. Negative temporal artery biopsy. Had a virtual visit with Neurology with plan for EMG. Denies joint or new skin symptoms. Strength intact on exam. Did receive ILD workup in past without evidence of CTD. Inflammatory markers, TSH, and CK all normal this spring, though suspect was on prednisone at some point around time checked. Reported negative sleep study years ago but has significant respiratory symptoms at night and has gained about 40 lbs in past couple years so believe worth repeat exam. Says will never wear a CPAP mask but discussed there are various masks and treatments that may be indicated.   - Will try to assist with arrangement of EMG  - Referral to Sleep Medicine    Follow up in Pulmonary clinic in 3 months    Seen and discussed with Dr. Perlman Christine Lambert MD PhD  Pulmonary and Critical Care Fellow   Pager [phone]    Attending Note:    The patient was seen examined by me with the pulmonary fellow, I have personally reviewed the imaging studies, lab and culture results.  The note reflects our joint findings, assessment and plan.      David Perlman, M.D.  Pulmonary, Allergy and Critical Care.      Chief Complaint and History of Present Illness:    CC:  Asthma, Dyspnea on Exertion    HPI:   History taken from patient and chart review.    Initially seen in Pulmonary clinic by Dr. Williamson in 2013 for asthma and cough. In 2014 worsening asthma control despite inhalers, Singulair, PPI, and  frequent prednisone. Had recently quit smoking but still had SHS exposure in the home. . Numerous ED visits and referred to Interventional Pulmonology for bronchial thermoplasty which completed 2/2015. Not seen in Pulmonary clinic since 2017 at which point was needing frequent courses of prednisone, had started smoking again.     In the past also evaluated by Allergy, allergic to dust mites, discussed sinus treatments. 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 Speech evaluation in 2017 where recommended course of speech therapy given underlying irritable larynx syndrome, bilateral vocal fold lesions seen on laryngoscopy. EGD 9/2017 showed hiatal hernia and esophagitis    This past spring reported myalgia, leg fatigue, jaw pain. Not related to statin, temporal artery biopsy negative, mildly abnormal exercise ABIs. Labs including CK, inflammatory markers, Vit D normal. Does appear was on prednisone around this time along with azithromycin. Neurology phone visit in April with plan for future EMG. Anxiety quite severe, worsened by recent death of nephew in May. ED visit for anxiety in middle of May during which a Code 21 called. In early July seen in Dermatology for hidradenitis suppurativa for which transitioned to Bactrim from doxycycline and given prednisone taper for flare. Given levofloxacin prescription 7/10 by PCP for COPD exacerbation, reportedly without improvement.    Today reports primary symptoms of cough and dyspnea on exertion. Coughs when first gets up and throughout the day. States has to prop self up on 8 pillows because of coughing at night. Typically coughs up clear mucus. In the last couple of months started to cough up clear mucus balls that are thicker, no hemoptysis. Feels like chest is achy all the time due to the coughing. Can be short of breath at rest. Will get short of breath  "and have wheezing when doing activity. Tries to walk to the store everyday which is 5 blocks away. Able to walk about 3 blocks before will have to take a break and a puff of albuterol. Thinks her oxygen drops over the course of the day and asks about getting an oximeter.    Using Trelegy or Symbicort inhaler, on Flonase in the morning, no longer on Spiriva of Singulair. Does albuterol nebs twice daily and uses her inhaler as needed, sounds like using everyday. Frequently on prednisone and antibiotics in the past. Finished her recent prednisone from Dermatology and antibiotics from her PCP. Currently just on Bactrim for hidradenitis suppurativa. Does not like the prednisone as it makes her feel hyper, thinks it helps some in opening up airways. Quit smoking two weeks ago, now on Chantix and using nicotine gum. Her father lives with her and smokes, she has encouraged him to smoke outside.     Does have a lot of trouble sleeping. Feels like if lays flat cannot breath and has a lot of coughing. Sometimes has feeling of drainage but a lot of the time nose feels dry. Does feel tired in the morning, does not usually fall asleep during the day, no am headaches. Worries may have nodules in her throat again. Has noticed her voice is deeper and will crack or go hoarse. Right side of her throat feels irritated and will get a tickle in throat that does not go away. Can start to cough if talking or laughing. Never went to Speech Therapy as said it \"felt weird.\"    No longer working, on disability since 2010. Started working in her teens and was always a . Reports being around various chemical fumes and solvents. Developed asthma in her 20s. Not sure if breathing was worse at work vs home. Has one short haired cat at home, denies issues breathing around cat. Denies significant dust in her home. No carpet, no feather pillows.     Review of Systems:  Complete 12 point ROS negative unless mentioned in " "HPI    Histories, Prior to Admission Medications, Allergies:    Past Medical History:  Past Medical History:   Diagnosis Date     Anxiety state, unspecified      Asthma      Chronic pain     back pain from cyst     Contact dermatitis and other eczema, due to unspecified cause      COPD (chronic obstructive pulmonary disease) (H)      Depressive disorder, not elsewhere classified      Emphysema with chronic bronchitis (H)      Esophageal reflux      Family history of ischemic heart disease      Gastro-oesophageal reflux disease      History of emphysema      Hoarseness      HTN, goal below 140/90      Hyperlipidemia LDL goal <130      Liver disease     \"fatty liver\"     Other chronic pain     chest, from coughing     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)      PONV (postoperative nausea and vomiting)      Past Surgical History:  Past Surgical History:   Procedure Laterality Date     ARTHROSCOPY SHOULDER DECOMPRESSION Left 10/21/2015    Procedure: ARTHROSCOPY SHOULDER DECOMPRESSION;  Surgeon: Julien Milian MD;  Location: [facility] OR     BIOPSY ARTERY TEMPORAL Left 3/11/2020    Procedure: LEFT TEMPORAL ARTERY BIOPSY;  Surgeon: Ibeth Conner MD;  Location: [facility] OR     BRONCHIAL THERMOPLASTY N/A 11/14/2014    Procedure: BRONCHIAL THERMOPLASTY;  Surgeon: Ward Whitaker MD;  Location: [facility] GI     BRONCHIAL THERMOPLASTY N/A 12/19/2014    Procedure: BRONCHIAL THERMOPLASTY;  Surgeon: Ward Whitaker MD;  Location: [facility]     BRONCHIAL THERMOPLASTY N/A 2/6/2015    Procedure: BRONCHIAL THERMOPLASTY;  Surgeon: Ward Whitaker MD;  Location: [facility] OR     C APPENDECTOMY  at age 17     COLONOSCOPY N/A 11/26/2018    Procedure: [facility]OSCOPY ([facility]);  Surgeon: Marshall Oakes MD;  Location: [facility] OR     DISCECTOMY, FUSION CERVICAL ANTERIOR ONE LEVEL, COMBINED N/A 5/1/2018    Procedure: COMBINED DISCECTOMY, FUSION CERVICAL ANTERIOR ONE LEVEL;  1.  C5-C6 anterior cervical diskectomy and fusion.    2.  C5-C6 " application of intervertebral biomechanical device for interbody fusion purposes.    3.  C5-C6 anterior instrumentation using the standard Kristin InViZia 24 mm plate with four associated bone screws, 12 mm in length.  Inferior screws are fixed.  Superior screws are va     ENT SURGERY  2015    polyps removed from vocal cords      EXCISE NODE MEDIASTINAL  4/26/2013    Procedure: EXCISE NODE MEDIASTINAL;;  Surgeon: Av Peña MD;  Location:  OR     LAPAROSCOPIC CHOLECYSTECTOMY N/A 10/19/2017    Procedure: LAPAROSCOPIC CHOLECYSTECTOMY;  LAPAROSCOPIC CHOLECYSTECTOMY;  Surgeon: Ney Jerry MD;  Location:  OR     THORACOSCOPY  4/26/2013    Procedure: THORACOSCOPY;  LEFT VIDEO ASSISTED THORACOSCOPY, RESECTION OF POSTERIOR MEDIASTINAL MASS;  Surgeon: Av Peña MD;  Location:  OR     TONSILLECTOMY  as a kid     TONSILLECTOMY       Past Social History:  Social History     Socioeconomic History     Marital status:      Spouse name: Not on file     Number of children: Not on file     Years of education: Not on file     Highest education level: Not on file   Occupational History     Not on file   Social Needs     Financial resource strain: Not on file     Food insecurity     Worry: Not on file     Inability: Not on file     Transportation needs     Medical: Not on file     Non-medical: Not on file   Tobacco Use     Smoking status: Current Every Day Smoker     Years: 30.00     Types: Cigarettes     Smokeless tobacco: Never Used     Tobacco comment: 2 cigs a day   Substance and Sexual Activity     Alcohol use: No     Alcohol/week: 0.0 standard drinks     Drug use: No     Sexual activity: Yes     Partners: Male     Birth control/protection: None   Lifestyle     Physical activity     Days per week: Not on file     Minutes per session: Not on file     Stress: Not on file   Relationships     Social connections     Talks on phone: Not on file     Gets together: Not on file     Attends  Scientologist service: Not on file     Active member of club or organization: Not on file     Attends meetings of clubs or organizations: Not on file     Relationship status: Not on file     Intimate partner violence     Fear of current or ex partner: Not on file     Emotionally abused: Not on file     Physically abused: Not on file     Forced sexual activity: Not on file   Other Topics Concern      Service Not Asked     Blood Transfusions Not Asked     Caffeine Concern No     Comment: 4-5 cans of pop daily     Occupational Exposure Not Asked     Hobby Hazards Not Asked     Sleep Concern Not Asked     Stress Concern Not Asked     Weight Concern Not Asked     Special Diet No     Back Care Not Asked     Exercise No     Comment: on hold     Bike Helmet Not Asked     Seat Belt Not Asked     Self-Exams Not Asked     Parent/sibling w/ CABG, MI or angioplasty before 65F 55M? Not Asked   Social History Narrative     Not on file     Family History:  Family History   Problem Relation Age of Onset     Heart Disease Mother         murmur, mi     Hypertension Mother      Cerebrovascular Disease Mother      Depression Mother      Gallbladder Disease Mother      Asthma Mother      Family History Negative Father         does not know  him     Family History Negative Brother      Heart Disease Maternal Grandfather      Asthma Maternal Grandfather      Hypertension Maternal Grandfather      Cerebrovascular Disease Maternal Grandfather      Diabetes Maternal Grandfather      Asthma Sister      Hypertension Sister      Cerebrovascular Disease Sister      Hypertension Maternal Grandmother      Cerebrovascular Disease Maternal Grandmother      Medications:  Current Outpatient Medications   Medication     acetaminophen (TYLENOL) 325 MG tablet     albuterol (PROAIR HFA/PROVENTIL HFA/VENTOLIN HFA) 108 (90 Base) MCG/ACT inhaler     albuterol (PROVENTIL) (2.5 MG/3ML) 0.083% neb solution     amLODIPine (NORVASC) 5 MG tablet      "atorvastatin (LIPITOR) 80 MG tablet     benzoyl peroxide 10 % EX external gel     blood glucose (NO BRAND SPECIFIED) lancets standard     blood glucose (NO BRAND SPECIFIED) test strip     blood glucose monitoring (NO BRAND SPECIFIED) meter device kit     budesonide-formoterol (SYMBICORT) 160-4.5 MCG/ACT IN Inhaler     buPROPion 150 MG PO 24 hr tablet     cetirizine HCl 10 MG CAPS     clindamycin 1 % EX external gel     desonide (DESOWEN) 0.05 % external cream     doxycycline hyclate (VIBRA-TABS) 100 MG tablet     fluticasone 50 MCG/ACT NA nasal spray     furosemide (LASIX) 20 MG tablet     glipiZIDE (GLUCOTROL XL) 2.5 MG 24 hr tablet     hydrOXYzine (ATARAX) 50 MG tablet     ibuprofen (ADVIL/MOTRIN) 600 MG tablet     lisinopril (ZESTRIL) 20 MG tablet     metFORMIN (GLUCOPHAGE) 500 MG tablet     metroNIDAZOLE (METROGEL) 0.75 % external gel     montelukast (SINGULAIR) 10 MG tablet     nicotine (EQ NICOTINE POLACRILEX) 2 MG lozenge     nicotine (NICORETTE) 2 MG gum     nicotine 14 MG/24HR TD 24 hr patch     nitroGLYcerin (NITROSTAT) 0.4 MG sublingual tablet     nystatin 771995 UNIT/GM EX external powder     omeprazole (PRILOSEC) 20 MG DR capsule     omeprazole 20 MG tablet     ondansetron (ZOFRAN) 8 MG tablet     order for DME     order for DME     predniSONE (DELTASONE) 20 MG tablet     senna-docusate (SENOKOT-S/PERICOLACE) 8.6-50 MG tablet     sulfamethoxazole-trimethoprim (BACTRIM DS) 800-160 MG tablet     tiotropium (SPIRIVA HANDIHALER) 18 MCG IN inhaled capsule     varenicline (CHANTIX) 1 MG tablet     No current facility-administered medications for this visit.      Allergies:  Allergies   Allergen Reactions     Codeine Nausea and Vomiting     vomiting     Cyclobenzaprine Nausea     Hydrocodone Nausea and Vomiting     Sulfa Drugs Nausea and Vomiting     Nausea     Gabapentin Rash     Physical Exam:       BP 93/60   Pulse 80   Resp 17   Ht 1.6 m (5' 3\")   Wt 102.1 kg (225 lb)   LMP 04/15/2016 (Exact Date)   " SpO2 97%   BMI 39.86 kg/m      General: Sitting up, NAD  HEENT: anicteric, no sinus tenderness  Neck: Area of swelling around left sternoclavicular joint, some tenderness, no tenderness of thyroid, no masses on palpation  Chest: Expiratory wheezing more pronounced on the left, no crackles  Cardiac: RRR no murmurs  Abdomen: Obese  Extremities: No LE Edema, no cyanosis or clubbing  Neuro: A&Ox3, no focal deficits, 5/5 strength in legs  Skin: no rash noted    Laboratory, imaging, and microbiologic data:    All laboratory and imaging data reviewed, pertinent results discussed above.    EXAM: XR CHEST 2 VW  7/31/2020 1:12 PM      HISTORY:  COPD, moderate (H)     COMPARISON:  Chest x-ray 5/10/2019, CT chest 12/26/2018     FINDINGS: Two views of the chest. Trachea is midline.  Cardiomediastinal silhouette is within normal limits. Bibasilar  streaky opacities. No pleural effusion or pneumothorax. Stable  cervical fusion hardware. Visualized upper abdomen is unremarkable.                                                              IMPRESSION: Negative chest x-ray.    Most Recent [facility] Pulmonary Function Testing    FVC-Pred   Date Value Ref Range Status   07/31/2020 3.15 L      FVC-Pre   Date Value Ref Range Status   07/31/2020 2.72 L      FVC-%Pred-Pre   Date Value Ref Range Status   07/31/2020 86 %      FEV1-Pre   Date Value Ref Range Status   07/31/2020 2.04 L      FEV1-%Pred-Pre   Date Value Ref Range Status   07/31/2020 80 %      FEV1FVC-Pred   Date Value Ref Range Status   07/31/2020 81 %      FEV1FVC-Pre   Date Value Ref Range Status   07/31/2020 75 %      GOG5LWU-%Pred-Pre   Date Value Ref Range Status   11/14/2014 89 %      FEFMax-Pred   Date Value Ref Range Status   07/31/2020 6.70 L/sec      FEFMax-Pre   Date Value Ref Range Status   07/31/2020 5.33 L/sec      FEFMax-%Pred-Pre   Date Value Ref Range Status   07/31/2020 79 %      ExpTime-Pre   Date Value Ref Range Status   07/31/2020 5.38 sec      FIFMax-Pre    Date Value Ref Range Status   07/31/2020 4.23 L/sec      FEV1FEV6-Pred   Date Value Ref Range Status   07/31/2020 82 %      FEV1FEV6-Pre   Date Value Ref Range Status   07/31/2020 75 %      MCS0LTJ0-%Pred-Pre   Date Value Ref Range Status   11/14/2014 87 %

## 2020-07-31 NOTE — PATIENT INSTRUCTIONS
Stop at lab today to get IgE level checked    Continue using your Trelegy inhaler daily    Start taking your Flonase at night    Use the Aerobika twice daily along with your nebulizer    We will help you get set up to see ENT to assess your vocal cords    Follow up with Neurology to get EMG (nerve study) of legs    Referral for Pulmonary Rehab to help with breathing and increasing activity    Referral to Sleep Medicine to see if have sleep apnea or other breathing issue at night    We have ordered an oximeter and will fax order in    Follow up in Pulmonary clinic in 3 months

## 2020-07-31 NOTE — NURSING NOTE
Chief Complaint   Patient presents with     Consult     New COPD     Medications reviewed and vital signs taken.   Rob Ruiz CMA

## 2020-08-02 LAB
ERV-%PRED-PRE: 59 %
ERV-PRE: 0.26 L
ERV-PRED: 0.43 L
EXPTIME-PRE: 5.38 SEC
FEF2575-%PRED-PRE: 61 %
FEF2575-PRE: 1.57 L/SEC
FEF2575-PRED: 2.55 L/SEC
FEFMAX-%PRED-PRE: 79 %
FEFMAX-PRE: 5.33 L/SEC
FEFMAX-PRED: 6.7 L/SEC
FEV1-%PRED-PRE: 80 %
FEV1-PRE: 2.04 L
FEV1FEV6-PRE: 75 %
FEV1FEV6-PRED: 82 %
FEV1FVC-PRE: 75 %
FEV1FVC-PRED: 81 %
FEV1SVC-PRE: 72 %
FEV1SVC-PRED: 74 %
FIFMAX-PRE: 4.23 L/SEC
FRCPLETH-%PRED-PRE: 108 %
FRCPLETH-PRE: 2.91 L
FRCPLETH-PRED: 2.69 L
FVC-%PRED-PRE: 86 %
FVC-PRE: 2.72 L
FVC-PRED: 3.15 L
IC-%PRED-PRE: 85 %
IC-PRE: 2.57 L
IC-PRED: 3.03 L
RVPLETH-%PRED-PRE: 150 %
RVPLETH-PRE: 2.65 L
RVPLETH-PRED: 1.76 L
TLCPLETH-%PRED-PRE: 110 %
TLCPLETH-PRE: 5.48 L
TLCPLETH-PRED: 4.94 L
VC-%PRED-PRE: 81 %
VC-PRE: 2.83 L
VC-PRED: 3.46 L

## 2020-08-03 DIAGNOSIS — M79.10 MYALGIA: Primary | ICD-10-CM

## 2020-08-04 ENCOUNTER — VIRTUAL VISIT (OUTPATIENT)
Dept: GASTROENTEROLOGY | Facility: CLINIC | Age: 50
End: 2020-08-04

## 2020-08-04 ENCOUNTER — TELEPHONE (OUTPATIENT)
Dept: PULMONOLOGY | Facility: CLINIC | Age: 50
End: 2020-08-04

## 2020-08-04 ENCOUNTER — TELEPHONE (OUTPATIENT)
Dept: GASTROENTEROLOGY | Facility: CLINIC | Age: 50
End: 2020-08-04

## 2020-08-04 DIAGNOSIS — Z53.9 NO SHOW: Primary | ICD-10-CM

## 2020-08-04 NOTE — TELEPHONE ENCOUNTER
[facility] Call Center    Phone Message    May a detailed message be left on voicemail: yes     Reason for Call: Other: pt returning call, please advise     Action Taken: Message routed to:  Adult Clinics: Gastroenterology (GI) p [id number]    Travel Screening: Not Applicable

## 2020-08-04 NOTE — TELEPHONE ENCOUNTER
[facility] Call Center    Phone Message    May a detailed message be left on voicemail: yes     Reason for Call: Other: returning call of missed appt to still have appt today please advise      Action Taken: Message routed to:  Adult Clinics: Gastroenterology (GI) p [id number]    Travel Screening: Not Applicable

## 2020-08-04 NOTE — TELEPHONE ENCOUNTER
Pt had phone visit today with Dr. Wiley.    Janessa Raza, RN  Gastroenterology Care Coordinator  [place], [place]

## 2020-08-05 ENCOUNTER — MYC MEDICAL ADVICE (OUTPATIENT)
Dept: SLEEP MEDICINE | Facility: CLINIC | Age: 50
End: 2020-08-05

## 2020-08-06 DIAGNOSIS — R73.03 PREDIABETES: ICD-10-CM

## 2020-08-06 NOTE — TELEPHONE ENCOUNTER
Pending Prescriptions:                       Disp   Refills    ACCU-CHEK ALLYSON PLUS test strip [Pharmacy *100 ea*0        Sig: USE TO TEST BLOOD SUGAR ONE TIME DAILY OR AS DIRECTED

## 2020-08-08 RX ORDER — BLOOD SUGAR DIAGNOSTIC
STRIP MISCELLANEOUS
Qty: 100 EACH | Refills: 0 | Status: SHIPPED | OUTPATIENT
Start: 2020-08-08 | End: 2021-11-04

## 2020-08-11 ENCOUNTER — VIRTUAL VISIT (OUTPATIENT)
Dept: NEUROLOGY | Facility: CLINIC | Age: 50
End: 2020-08-11

## 2020-08-11 DIAGNOSIS — M79.7 FIBROMYALGIA: Primary | ICD-10-CM

## 2020-08-11 RX ORDER — NORTRIPTYLINE HCL 10 MG
25 CAPSULE ORAL AT BEDTIME
Qty: 30 CAPSULE | Refills: 4 | Status: SHIPPED | OUTPATIENT
Start: 2020-08-11 | End: 2020-10-15

## 2020-08-11 NOTE — PROGRESS NOTES
Service Date: 2020      Roro Chawla MD   [facility] [address] [place] [address]       RE: Swetha Patterson    MRN: [id number]   : 1970      Dear Dr. Chawla:       We have had a video visit with Ms. Swetha Patterson regarding her complaints of aching in the muscles in the jaw with mastication.  This patient is 50 years old and had previously been evaluated by the undersigned in April of this year through a telephone visit.  At that time, she had had a workup for this tenderness in her temples, including a biopsy of the temporal artery, and she has had negative sedimentation rates, but her jaw claudication suggested the possibility.  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  At that time, we were concerned about the possibility of some muscle disorder.  She complained of generalized body aching, especially when she moved.  A workup was directed in that, and she had a CPK and inflammatory markers drawn, and these were all negative.  We are short an EMG to rule out some sort of myopathy, but she refused an EMG.  Today she reports continued trouble with jaw claudication when she chews.  She says she has aching in the muscles and goes out for walks and cannot complete them.        Her workup in the past has included ordering Sjogren titers and antinuclear antibodies, but she never followed.  She did not have them for some reason, probably related to the pandemic.  She had a biopsy in March of the temporal artery without any evidence of arteritis in the left.      PHYSICAL EXAMINATION:  I reexamined her today.  Her blood pressure is 93/60.  She shows no  particular tenderness in the TMJ area when she chews.  She has normal masseter and pterygoid action.  She has normal eye movements as best I can determine.  Her face is pretty symmetrical.  She reports aching in the shoulders when she lifts them and also in her thighs.  Her thighs are tender to her touching and palpation.      In summary, I think she probably has fibromyalgia with involvement of the masseteric muscles, which is unusual, but I suppose it can happen.  We will try her on nortriptyline 30 mg at bedtime and see how she does with that.  If she continues to be symptomatic, we will have to run some rheumatological tests.      Total time of the video conference on virtual visit today was about 25 minutes.       Sincerely,      MD MARICHUY English MD             D: 2020   T: 2020   MT: nimo      Name:     KIT MILLS   MRN:      [id number]        Account:      [id number]   :      1970           Service Date: 2020      Document: [id number]

## 2020-08-11 NOTE — LETTER
8/11/2020       RE: Swetha Patterson  [address]  [place] [address]     Dear Colleague,    Thank you for referring your patient, Swetha Patterson, to the [facility] NEUROLOGY at [facility]. Please see a copy of my visit note below.          Consented for video visit .  Visit lasted 30 min.  Fiol      Again, thank you for allowing me to participate in the care of your patient.      Sincerely,    Ambrocio Buck MD

## 2020-08-11 NOTE — LETTER
2020      RE: Swetha Patterson  [address] [place] [address]             Consented for video visit .  Visit lasted 30 min.  [facility]      Service Date: 2020      Roro Chawla MD   [facility] [address] [place] [address]       RE: Swetha Patterson    MRN: [id number]   : 1970      Dear Dr. Chawla:       We have had a video visit with Ms. Swetha Patterson regarding her complaints of aching in the muscles in the jaw with mastication.  This patient is 50 years old and had previously been evaluated by the undersigned in April of this year through a telephone visit.  At that time, she had had a workup for this tenderness in her temples, including a biopsy of the temporal artery, and she has had negative sedimentation rates, but her jaw claudication suggested the possibility.  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  At that time, we were concerned about the possibility of some muscle disorder.  She complained of generalized body aching, especially when she moved.  A workup was directed in that, and she had a CPK and inflammatory markers drawn, and these were all negative.  We are short an EMG to rule out some sort of myopathy, but she refused an EMG.  Today she reports continued trouble with jaw claudication when she chews.  She says she has aching in the muscles and goes out for walks and cannot complete them.        Her workup in the past has included ordering Sjogren titers and antinuclear antibodies, but she never followed.  She did not have them for some reason, probably related to the pandemic.  She had a biopsy in March of the temporal  artery without any evidence of arteritis in the left.      PHYSICAL EXAMINATION:  I reexamined her today.  Her blood pressure is 93/60.  She shows no particular tenderness in the TMJ area when she chews.  She has normal masseter and pterygoid action.  She has normal eye movements as best I can determine.  Her face is pretty symmetrical.  She reports aching in the shoulders when she lifts them and also in her thighs.  Her thighs are tender to her touching and palpation.      In summary, I think she probably has fibromyalgia with involvement of the masseteric muscles, which is unusual, but I suppose it can happen.  We will try her on nortriptyline 30 mg at bedtime and see how she does with that.  If she continues to be symptomatic, we will have to run some rheumatological tests.      Total time of the video conference on virtual visit today was about 25 minutes.       Sincerely,      Ambrocio Buck MD         D: 2020   T: 2020   MT: nimo      Name:     KIT MILLS   MRN:      [id number]        Account:      [id number]   :      1970           Service Date: 2020      Document: [id number]

## 2020-08-12 ENCOUNTER — TELEPHONE (OUTPATIENT)
Dept: INTERNAL MEDICINE | Facility: CLINIC | Age: 50
End: 2020-08-12

## 2020-08-12 NOTE — TELEPHONE ENCOUNTER
Patient was seen by neurology yesterday and diagnosed with fibromyalgia and given nortriptyline 10 mg, 3 caps at bedtime. This medication is not helping her with the pain and it is difficult for her to move. Call her back to advise at [phone] (home)

## 2020-08-13 NOTE — TELEPHONE ENCOUNTER
Spoke with patient , advised that med will take time to work for her leg pain.  She has tried taking a warm bath and resting.  Advised that 24 hours is not enough time to  if med is going to help reduce her pain and to give it more time.      Dr. Low, do you have anything else to add? If not, close encounter please.  Patient is not expecting a call back.  SERJIO Chandra R.N.

## 2020-08-14 ENCOUNTER — TELEPHONE (OUTPATIENT)
Dept: INTERNAL MEDICINE | Facility: CLINIC | Age: 50
End: 2020-08-14

## 2020-08-14 NOTE — TELEPHONE ENCOUNTER
Patient calls and states she was diagnosed with Fibra myalgia by her Neurologist and was told to contact PCP for either Gabapentin or Lyrica to help with her pain.  Patient states she is trying everything she was advised and is still in pain and not sleeping.  Please address.

## 2020-08-17 NOTE — TELEPHONE ENCOUNTER
Patient calling and message given. She is still not doing well. She was told it could take 2 weeks to get the benefits of the medication she takes at night. Patient stated she cant take this pain for that long. Please advise. Ok to call and nettie [phone]

## 2020-08-17 NOTE — TELEPHONE ENCOUNTER
Please see [employer] message sent to patient today regarding virtual visit schedule for patient for medication request.

## 2020-08-18 ENCOUNTER — TRANSFERRED RECORDS (OUTPATIENT)
Dept: HEALTH INFORMATION MANAGEMENT | Facility: CLINIC | Age: 50
End: 2020-08-18

## 2020-08-18 ENCOUNTER — TELEPHONE (OUTPATIENT)
Dept: NEUROLOGY | Facility: CLINIC | Age: 50
End: 2020-08-18

## 2020-08-18 NOTE — TELEPHONE ENCOUNTER
RECORDS RECEIVED FROM: Self Referral    DATE RECEIVED: 8/31/2020   NOTES STATUS DETAILS   OFFICE NOTE from referring provider N/A    OFFICE NOTE from other specialist Internal/ Received  6/3/2020, 8/22/18, 10/24/17, 10/16/17, 10/6/17 Office visit with Dr. Roro Chawla [place] more office visits in Pt's chart     [facility]:  - 8/18/2020 Office visit with Dr. Ulises Krishna   - 8/23/19 Office visit with Dr. Jas Frausto    - 11/12/18, 9/18/18, 10/9/17 Office visit with Dr. Theodore Oakes     9/22/17 Nurse Triage ()    DISCHARGE SUMMARY from hospital Internal 10/21/17, 9/19/17 ([facility])    OPERATIVE REPORT Internal/ Received     MEDICATION LIST Internal         ENDOSCOPY  Received EGD: 8/24/18,  9/22/17 ([facility])    COLONOSCOPY Internal 11/26/18   ERCP N/A    EUS N/A    STOOL TESTING N/A    PERTINENT LABS Internal    PATHOLOGY REPORTS (RELATED) Received / Internal  8/24/18, 9/22/17 ([facility])    IMAGING (CT, MRI, EGD) Internal CT Enterography: 12/13/18  CT Abdomen Pelvis: 10/21/17, 9/19/17  US Abdomen: 9/26/17, 9/19/17     REFERRAL INFORMATION    Date referral was placed: 8/31/2020   Date all records received:    Date records were scanned into EPIC:    Date records were sent to Provider to review:    Date and recommendation received from provider:  LETTER SENT  SCHEDULE APPOINTMENT   Date patient was contacted to schedule: 8/18/2020 8/18/2020 2:11pm Fax request sent to [facility] ([phone]) for recs. -Tasha     8/19/2020 9:13am Recs received from [facility] and sent to scan for uploading. A copy was placed in provider's folder in Right[employer]ax. -Tasha

## 2020-08-20 VITALS — BODY MASS INDEX: 39.34 KG/M2 | WEIGHT: 222 LBS | HEIGHT: 63 IN

## 2020-08-20 ASSESSMENT — MIFFLIN-ST. JEOR: SCORE: 1591.12

## 2020-08-20 NOTE — PATIENT INSTRUCTIONS
Your BMI is Body mass index is 39.33 kg/m .  Weight management is a personal decision.  If you are interested in exploring weight loss strategies, the following discussion covers the approaches that may be successful. Body mass index (BMI) is one way to tell whether you are at a healthy weight, overweight, or obese. It measures your weight in relation to your height.  A BMI of 18.5 to 24.9 is in the healthy range. A person with a BMI of 25 to 29.9 is considered overweight, and someone with a BMI of 30 or greater is considered obese. More than two-thirds of American adults are considered overweight or obese.  Being overweight or obese increases the risk for further weight gain. Excess weight may lead to heart disease and diabetes.  Creating and following plans for healthy eating and physical activity may help you improve your health.  Weight control is part of healthy lifestyle and includes exercise, emotional health, and healthy eating habits. Careful eating habits lifelong are the mainstay of weight control. Though there are significant health benefits from weight loss, long-term weight loss with diet alone may be very difficult to achieve- studies show long-term success with dietary management in less than 10% of people. Attaining a healthy weight may be especially difficult to achieve in those with severe obesity. In some cases, medications, devices and surgical management might be considered.  What can you do?  If you are overweight or obese and are interested in methods for weight loss, you should discuss this with your provider.     Consider reducing daily calorie intake by 500 calories.     Keep a food journal.     Avoiding skipping meals, consider cutting portions instead.    Diet combined with exercise helps maintain muscle while optimizing fat loss. Strength training is particularly important for building and maintaining muscle mass. Exercise helps reduce stress, increase energy, and improves fitness.  Increasing exercise without diet control, however, may not burn enough calories to loose weight.       Start walking three days a week 10-20 minutes at a time    Work towards walking thirty minutes five days a week     Eventually, increase the speed of your walking for 1-2 minutes at time    In addition, we recommend that you review healthy lifestyles and methods for weight loss available through the National Institutes of Health patient information sites:  http://win.niddk.nih.gov/publications/index.htm    And look into health and wellness programs that may be available through your health insurance provider, employer, local community center, or lucie club.    Weight management plan: Patient was referred to their PCP to discuss a diet and exercise plan.

## 2020-08-21 ENCOUNTER — VIRTUAL VISIT (OUTPATIENT)
Dept: SLEEP MEDICINE | Facility: CLINIC | Age: 50
End: 2020-08-21

## 2020-08-21 DIAGNOSIS — J45.40 MODERATE PERSISTENT ASTHMA, UNSPECIFIED WHETHER COMPLICATED: ICD-10-CM

## 2020-08-24 ENCOUNTER — VIRTUAL VISIT (OUTPATIENT)
Dept: OTOLARYNGOLOGY | Facility: CLINIC | Age: 50
End: 2020-08-24

## 2020-08-24 ENCOUNTER — TELEPHONE (OUTPATIENT)
Dept: OTOLARYNGOLOGY | Facility: CLINIC | Age: 50
End: 2020-08-24

## 2020-08-24 VITALS — WEIGHT: 219 LBS | BODY MASS INDEX: 38.8 KG/M2 | HEIGHT: 63 IN

## 2020-08-24 DIAGNOSIS — R05.3 CHRONIC COUGH: ICD-10-CM

## 2020-08-24 DIAGNOSIS — R49.0 DYSPHONIA: ICD-10-CM

## 2020-08-24 DIAGNOSIS — R22.1 NECK MASS: Primary | ICD-10-CM

## 2020-08-24 DIAGNOSIS — R07.0 THROAT PAIN: ICD-10-CM

## 2020-08-24 DIAGNOSIS — R05.3 CHRONIC COUGH: Primary | ICD-10-CM

## 2020-08-24 DIAGNOSIS — R13.19 OTHER DYSPHAGIA: ICD-10-CM

## 2020-08-24 DIAGNOSIS — Z87.891 PERSONAL HISTORY OF TOBACCO USE, PRESENTING HAZARDS TO HEALTH: ICD-10-CM

## 2020-08-24 DIAGNOSIS — J38.7 IRRITABLE LARYNX: ICD-10-CM

## 2020-08-24 RX ORDER — LAMOTRIGINE 100 MG/1
100 TABLET ORAL DAILY
COMMUNITY
Start: 2020-08-20 | End: 2021-09-24

## 2020-08-24 ASSESSMENT — MIFFLIN-ST. JEOR: SCORE: 1577.51

## 2020-08-24 ASSESSMENT — PAIN SCALES - GENERAL: PAINLEVEL: EXTREME PAIN (8)

## 2020-08-24 NOTE — PROGRESS NOTES
"  Swetha Patterson is a 50 year old female who is being cared for via a billable virtual visit.      The Swetha Patterson has been notified and verbally consented to the following:     \"This billable virtual visit will be conducted between you and your provider.\"     \"Patient has opted to conduct today's billable virtual visit vs an in-person appointment, and is not able to attend due to possible exposure to COVID-19\"    If during the course of the call the provider feels the appointment is not appropriate, you will not be charged for this service.\"     Provider has received verbal consent for billable virtual visit from the patient? Yes    Preferred method for receiving information:     Call initiated at: 10:32 AM  Platform used to conduct today's virtual appointment: [employer] video  Location of provider: Residence  Location of patient: [facility]  Tanner Ochoa Jr., M.D., F.A.C.S.  Lis Talbert M.D., M.P.H.  Palma Almonte M.D.  Kandice Felton, Ph.D., CCC/SLP  Dianne Medel M.M. (voice), M.MALVIN., CCC/SLP  Jefferson Swain M.M. (voice), MTRAVIS., CCC/SLP     [facility]  VOICE/SPEECH/BREATHING THERAPY  CLINICAL FOLLOW-UP AND LARYNGEAL EXAMINATION REPORT    Clinician: Dianne Medel M.M. (voice), M.A., CCC/SLP  Seen in conjunction with: Dr. Talbert  Referring physician:  No ref. provider found  Patient: Swetha Patterson  Date of Visit: 8/24/2020    HISTORY  PATIENT INFORMATION  Swetha Patterson was seen for follow-up in conjunction with a visit to  Dr. Talbert today.  Please also refer to Dr. Talbert's dictation.  She was last seen on 8/18/17.  At that time, impressions were as follows:  \"IMPRESSIONS: R49.0 (Dysphonia), R05 (Chronic Cough), J38.7 (Irritable Larynx Syndrome) and (pre-nodular edema of the vocal folds)   ? Laryngeal evaluation demonstrated:    Bilateral vocal fold lesions at the anterior 1/3rd junction along the vibratory margin; the left is prominent and shallowly enters into the " "glottis; right vocal fold demonstrates a reactive concave shape along the vibratory margin, with some mild irregular mucosa.    Moderate supraglottic hyperfunction.    Stroboscopy (somewhat difficult to capture secondary to hypersensitivity):    Amplitude: reduced bilaterally    Symmetry: asymmetrical    Phase: regular phase    Hourglass glottic configuration.  ? Dysphonia is accounted for by the vocal fold lesions, and resulting aberrations in vibratory characteristics and mucosal wave  ? Dysphonia/discomfort is compounded by the hyperfunction and imbalanced function of the intrinsic and extrinsic laryngeal musculature    ? Cough/throat clear are accounted for by the hypersensitivity of the larynx and pharynx as evidenced by case history, patient complaints and absence of other organic findings; hypersensitivity is compounded by imbalance in the function of the intrinsic and extrinsic laryngeal musculature during connected speech   ? Ms. Patterson also has a good awareness of how stress and anxiety can contribute to her symptoms.\"    PROGRESS SINCE LAST VISIT  Ms. Patterson reports:    voice quality has worsened, as well as her cough and swallow.    PROGRESS ON LONG-TERM GOALS: set-back, although therapy was never initiated following her 2017 evaluation.    CURRENT/ ONGOING SYMPTOMS INCLUDE  VOICE    Worsening voice quality over the past 4 months.    Difficulty with projection    The voice issues began before the cough issue    She is having difficulty with regular talking with friends and family over the phone.     She is on social security/ disability    Talking worsens her voice quality    Voice quality is best in the morning, especially after she drinks a liquid.     THROAT ISSUES     COUGH:  o Chronic cough, that has been worsening over the past 4 months.  o Reports \"people think that she has COVID 19\"  o Worsens her voice quality.  o Mostly a dry cough      THROAT CLEARING:  o Not a significant " "issue      GLOBUS:  o Feels a sensation/ pain on the right side of the throat, which seems to cause her cough.   o Also feels a lump sensation on the left side of her throat that is not painful, unless palpated.   - She noticed it beginning 1.5 years ago.  - She reports that it feels big, but must feel it, rather than observe it.     SWALLOWING    She chews carefully, but the food does not want to go down.     She reports that it feels stuck in her chest, near her lower breast bone.    Formal swallow study is scheduled for tomorrow.     Seeing GI for her swallowing symptoms    RESPIRATION    Smoker - using gum, and also will start Chantx soon.    She has had death from lung cancer in her family.     She is getting ready to move, and she cannot smoke in the apartment, so she believes this will also be a help.    OBJECTIVE  Past Medical History:   Diagnosis Date     Anxiety state, unspecified      Asthma      Chronic pain     back pain from cyst     Contact dermatitis and other eczema, due to unspecified cause      COPD (chronic obstructive pulmonary disease) (H)      Depressive disorder, not elsewhere classified      Emphysema with chronic bronchitis (H)      Esophageal reflux      Family history of ischemic heart disease      Gastro-oesophageal reflux disease      History of emphysema      Hoarseness      HTN, goal below 140/90      Hyperlipidemia LDL goal <130      Liver disease     \"fatty liver\"     Other chronic pain     chest, from coughing     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)      PONV (postoperative nausea and vomiting)      Past Surgical History:   Procedure Laterality Date     ARTHROSCOPY SHOULDER DECOMPRESSION Left 10/21/2015    Procedure: ARTHROSCOPY SHOULDER DECOMPRESSION;  Surgeon: Julien Milian MD;  Location: [facility] OR     BIOPSY ARTERY TEMPORAL Left 3/11/2020    Procedure: LEFT TEMPORAL ARTERY BIOPSY;  Surgeon: Ibeth Conner MD;  Location: [facility] OR     BRONCHIAL THERMOPLASTY N/A " 11/14/2014    Procedure: BRONCHIAL THERMOPLASTY;  Surgeon: Ward Whitaker MD;  Location: [facility]     BRONCHIAL THERMOPLASTY N/A 12/19/2014    Procedure: BRONCHIAL THERMOPLASTY;  Surgeon: Ward Whitaker MD;  Location: [facility]     BRONCHIAL THERMOPLASTY N/A 2/6/2015    Procedure: BRONCHIAL THERMOPLASTY;  Surgeon: Ward Whitaker MD;  Location: [facility] OR     C APPENDECTOMY  at age 17     COLONOSCOPY N/A 11/26/2018    Procedure: COLONOSCOPY ([facility]);  Surgeon: Marshall Oakes MD;  Location: [facility] OR     DISCECTOMY, FUSION CERVICAL ANTERIOR ONE LEVEL, COMBINED N/A 5/1/2018    Procedure: COMBINED DISCECTOMY, FUSION CERVICAL ANTERIOR ONE LEVEL;  1.  C5-C6 anterior cervical diskectomy and fusion.    2.  C5-C6 application of intervertebral biomechanical device for interbody fusion purposes.    3.  C5-C6 anterior instrumentation using the standard Kristin InViZia 24 mm plate with four associated bone screws, 12 mm in length.  Inferior screws are fixed.  Superior screws are va     ENT SURGERY  2015    polyps removed from vocal cords      EXCISE NODE MEDIASTINAL  4/26/2013    Procedure: EXCISE NODE MEDIASTINAL;;  Surgeon: Av Peña MD;  Location:  OR     LAPAROSCOPIC CHOLECYSTECTOMY N/A 10/19/2017    Procedure: LAPAROSCOPIC CHOLECYSTECTOMY;  LAPAROSCOPIC CHOLECYSTECTOMY;  Surgeon: Ney Jerry MD;  Location:  OR     THORACOSCOPY  4/26/2013    Procedure: THORACOSCOPY;  LEFT VIDEO ASSISTED THORACOSCOPY, RESECTION OF POSTERIOR MEDIASTINAL MASS;  Surgeon: Av Peña MD;  Location:  OR     TONSILLECTOMY  as a kid     TONSILLECTOMY           PATIENT REPORTED MEASURES  Patient Supplied Answers To VHI Questionnaire  Voice Handicap Index (VHI-10) 8/24/2020   My voice makes it difficult for people to hear me 2   People have difficulty understanding me in a noisy room 1   My voice difficulties restrict my personal and social life.  2   I feel left out of conversations because of my  "voice 1   My voice problem causes me to lose income 3   I feel as though I have to strain to produce voice 2   The clarity of my voice is unpredictable 1   My voice problem upsets me 2   My voice makes me feel handicapped 0   People ask, \"What's wrong with your voice?\" 2   VHI-10 16       Patient Supplied Answers To CSI Questionnaire  No flowsheet data found.    Patient Supplied Answers To EAT Questionnaire  No flowsheet data found.      OBJECTIVE FINDINGS  PERCEPTUAL EVALUATION (CPT 76801)  POSTURE / TENSION:     upper body    neck and shoulders    BREATHING:     appears within normal limits and adequate for walking and at rest    clavicular    Not consistently coordinated for speech    Provoked a cough    LARYNGEAL PALPATION:     Denies any significant tension or discomfort at the thyrohyoid space.    She reports that     VOICE:    Roughness: Mild to moderate    Breathiness: WNL    Strain: Mild Intermittent    Loudness    Conversational speech:  WNL    Projected speech:  WNL    Pitch:    Conversational speech:  Mildly lowered    Pitch glide: neurologially normal    Resonance:    Conversational speech:  laryngeal pharyngeal resonance    Singing vs. Speech:  n/a    CAPE-V Overall Severity:  35/100    COUGH/THROAT CLEARING:    Occasional    Dry    LARYNGEAL FUNCTION STUDIES (CPT 01499)  Not completed due to COVID 19    LARYNGEAL EXAMINATION  Recommended to complete in clinic with Dr. Talbert    THERAPY PROBES: Improvement was elicited with use of forward resonant stimuli; glides, and techniques to improve flow phonation resulted in a worse voice quality.    IMPRESSIONS/ RECOMMENDATIONS/ PLAN  IMPRESSIONS: Swetha Patterson is a 50 year old female, presenting today for follow-up with R49.0 (Dysphonia), R05 (Chronic Cough), F45.8 (Globus Sensation) and R07.0 (Throat Pain).    STIMULABILITY: results of therapy probes during perceptual and laryngeal evaluation demonstrate improvement with forward resonant stimuli; glides, " and techniques to improve flow phonation resulted in a worse voice quality.    RECOMMENDATIONS:     Begin speech therapy    In-person appt for a scope    CT scan of the neck    DURATION / FREQUENCY: 3 biweekly one-hour sessions, followed by 2 one-hour biweekly sessions.  A total of 6-8 sessions may be necessary.    GOALS:  Patient goal:   2. To improve and maintain a healthy voice quality  3. To resolve the vocal fold lesions  4. To understand the problem and fix it as much as possible  5. To reduce her cough to acceptable levels  6. To breathe normally and comfortably in all situations    Short-term goal(s): Within the first 4 sessions, Ms. Patterson:  1. will be able to demonstrate provided cough suppression and substitution strategies from memory independently with 90% accuracy  2. will be able to independently list key factors in maintenance of good vocal hygiene with 80% accuracy, and report on their use outside the therapy room.  3. will demonstrate semi-occluded vocal tract (SOVT) exercises with at least 80% accuracy with no clinician support    Long-term goal(s): In 6 months, Ms. Patterson will:  1. Report a week with no more than 2 episodes of coughing, that do not last more than 2 seconds  2. Report resolution of dysphonia, and use of optimal voice quality to meet personal, social, and professional needs, 90% of the time during a typical week of vocal activities    This treatment plan was developed with the patient who agreed with the recommendations.    Certification date from: 8/24/20  Certification date to: 11/22/20      TOTAL SERVICE TIME:   Call Initiated at: 10:32 AM  Call Ended at: 11:00    EVALUATION OF VOICE AND RESONANCE (55505)  NO CHARGE FACILITY FEE (59807)    Dianne Medel M.M. (voice) M.A., CCC/SLP  Speech-Language Pathologist  Certificate of Vocology  [facility]  [phone]  [email or web address]  Prounouns: she/her      *this report was created in part through the use of  computerized dictation software, and though reviewed following completion, some typographic errors may persist.  If there is confusion regarding any of this notes contents, please contact me for clarification

## 2020-08-24 NOTE — LETTER
"8/24/2020       RE: Swetha Patterson  [address]  [place] [address]     Dear Colleague,    Thank you for referring your patient, Swetha Patterson, to the [facility] at [facility]. Please see a copy of my visit note below.      Swetha Patterson is a 50 year old female who is being cared for via a billable virtual visit.      The Swetha Patterson has been notified and verbally consented to the following:     \"This billable virtual visit will be conducted between you and your provider.\"     \"Patient has opted to conduct today's billable virtual visit vs an in-person appointment, and is not able to attend due to possible exposure to COVID-19\"    If during the course of the call the provider feels the appointment is not appropriate, you will not be charged for this service.\"     Provider has received verbal consent for billable virtual visit from the patient? Yes    Preferred method for receiving information:     Call initiated at: 10:32 AM  Platform used to conduct today's virtual appointment: [employer] Well video  Location of provider: Residence  Location of patient: [facility]  Tanner Ochoa Jr., M.D., F.A.C.S.  Lis Talbert M.D., M.P.H.  Palma Almonte M.D.  Kandice Felton, Ph.D., CCC/SLP  Dianne Medel M.M. (voice), M.MALVIN., CCC/SLP  Jefferson Swain M.M. (voice), M.A., CCC/SLP     [facility]  VOICE/SPEECH/BREATHING THERAPY  CLINICAL FOLLOW-UP AND LARYNGEAL EXAMINATION REPORT    Clinician: Dianne Medel M.M. (voice) M.ANGELIQUE, CCC/SLP  Seen in conjunction with: Dr. Talbert  Referring physician:  No ref. provider found  Patient: Swetha Patterson  Date of Visit: 8/24/2020    HISTORY  PATIENT INFORMATION  Swetha Patterson was seen for follow-up in conjunction with a visit to  Dr. Talbert today.  Please also refer to Dr. Talbert's dictation.  She was last seen on 8/18/17.  At that time, impressions were as follows:  \"IMPRESSIONS: R49.0 (Dysphonia), R05 " "(Chronic Cough), J38.7 (Irritable Larynx Syndrome) and (pre-nodular edema of the vocal folds)   ? Laryngeal evaluation demonstrated:    Bilateral vocal fold lesions at the anterior 1/3rd junction along the vibratory margin; the left is prominent and shallowly enters into the glottis; right vocal fold demonstrates a reactive concave shape along the vibratory margin, with some mild irregular mucosa.    Moderate supraglottic hyperfunction.    Stroboscopy (somewhat difficult to capture secondary to hypersensitivity):    Amplitude: reduced bilaterally    Symmetry: asymmetrical    Phase: regular phase    Hourglass glottic configuration.  ? Dysphonia is accounted for by the vocal fold lesions, and resulting aberrations in vibratory characteristics and mucosal wave  ? Dysphonia/discomfort is compounded by the hyperfunction and imbalanced function of the intrinsic and extrinsic laryngeal musculature    ? Cough/throat clear are accounted for by the hypersensitivity of the larynx and pharynx as evidenced by case history, patient complaints and absence of other organic findings; hypersensitivity is compounded by imbalance in the function of the intrinsic and extrinsic laryngeal musculature during connected speech   ? Ms. Patterson also has a good awareness of how stress and anxiety can contribute to her symptoms.\"    PROGRESS SINCE LAST VISIT  Ms. Patterson reports:    voice quality has worsened, as well as her cough and swallow.    PROGRESS ON LONG-TERM GOALS: set-back, although therapy was never initiated following her 2017 evaluation.    CURRENT/ ONGOING SYMPTOMS INCLUDE  VOICE    Worsening voice quality over the past 4 months.    Difficulty with projection    The voice issues began before the cough issue    She is having difficulty with regular talking with friends and family over the phone.     She is on social security/ disability    Talking worsens her voice quality    Voice quality is best in the morning, especially after she " "drinks a liquid.     THROAT ISSUES     COUGH:  o Chronic cough, that has been worsening over the past 4 months.  o Reports \"people think that she has COVID 19\"  o Worsens her voice quality.  o Mostly a dry cough      THROAT CLEARING:  o Not a significant issue      GLOBUS:  o Feels a sensation/ pain on the right side of the throat, which seems to cause her cough.   o Also feels a lump sensation on the left side of her throat that is not painful, unless palpated.   - She noticed it beginning 1.5 years ago.  - She reports that it feels big, but must feel it, rather than observe it.     SWALLOWING    She chews carefully, but the food does not want to go down.     She reports that it feels stuck in her chest, near her lower breast bone.    Formal swallow study is scheduled for tomorrow.     Seeing GI for her swallowing symptoms    RESPIRATION    Smoker - using gum, and also will start Chantx soon.    She has had death from lung cancer in her family.     She is getting ready to move, and she cannot smoke in the apartment, so she believes this will also be a help.    OBJECTIVE  Past Medical History:   Diagnosis Date     Anxiety state, unspecified      Asthma      Chronic pain     back pain from cyst     Contact dermatitis and other eczema, due to unspecified cause      COPD (chronic obstructive pulmonary disease) (H)      Depressive disorder, not elsewhere classified      Emphysema with chronic bronchitis (H)      Esophageal reflux      Family history of ischemic heart disease      Gastro-oesophageal reflux disease      History of emphysema      Hoarseness      HTN, goal below 140/90      Hyperlipidemia LDL goal <130      Liver disease     \"fatty liver\"     Other chronic pain     chest, from coughing     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)      PONV (postoperative nausea and vomiting)      Past Surgical History:   Procedure Laterality Date     ARTHROSCOPY SHOULDER DECOMPRESSION Left 10/21/2015    Procedure: " ARTHROSCOPY SHOULDER DECOMPRESSION;  Surgeon: Julien Milian MD;  Location: [facility] OR     BIOPSY ARTERY TEMPORAL Left 3/11/2020    Procedure: LEFT TEMPORAL ARTERY BIOPSY;  Surgeon: Ibeth Conner MD;  Location: [facility] OR     BRONCHIAL THERMOPLASTY N/A 11/14/2014    Procedure: BRONCHIAL THERMOPLASTY;  Surgeon: Ward Whitaker MD;  Location: [facility]     BRONCHIAL THERMOPLASTY N/A 12/19/2014    Procedure: BRONCHIAL THERMOPLASTY;  Surgeon: Ward Whitaker MD;  [facility]: [facility]     BRONCHIAL THERMOPLASTY N/A 2/6/2015    Procedure: BRONCHIAL THERMOPLASTY;  Surgeon: Ward Whitaker MD;  Location: [facility]     C APPENDECTOMY  at age 17     COLONOSCOPY N/A 11/26/2018    Procedure: COLONOSCOPY ([facility]);  Surgeon: Marshall Oakes MD;  Location: [facility] OR     DISCECTOMY, FUSION CERVICAL ANTERIOR ONE LEVEL, COMBINED N/A 5/1/2018    Procedure: COMBINED DISCECTOMY, FUSION CERVICAL ANTERIOR ONE LEVEL;  1.  C5-C6 anterior cervical diskectomy and fusion.    2.  C5-C6 application of intervertebral biomechanical device for interbody fusion purposes.    3.  C5-C6 anterior instrumentation using the standard Kristin InViZia 24 mm plate with four associated bone screws, 12 mm in length.  Inferior screws are fixed.  Superior screws are va     ENT SURGERY  2015    polyps removed from vocal cords      EXCISE NODE MEDIASTINAL  4/26/2013    Procedure: EXCISE NODE MEDIASTINAL;;  Surgeon: Av Peña MD;  Location:  OR     LAPAROSCOPIC CHOLECYSTECTOMY N/A 10/19/2017    Procedure: LAPAROSCOPIC CHOLECYSTECTOMY;  LAPAROSCOPIC CHOLECYSTECTOMY;  Surgeon: Ney Jerry MD;  Location:  OR     THORACOSCOPY  4/26/2013    Procedure: THORACOSCOPY;  LEFT VIDEO ASSISTED THORACOSCOPY, RESECTION OF POSTERIOR MEDIASTINAL MASS;  Surgeon: Av Peña MD;  Location:  OR     TONSILLECTOMY  as a kid     TONSILLECTOMY           PATIENT REPORTED MEASURES  Patient Supplied Answers To VHI Questionnaire  Voice Handicap  "Index (VHI-10) 8/24/2020   My voice makes it difficult for people to hear me 2   People have difficulty understanding me in a noisy room 1   My voice difficulties restrict my personal and social life.  2   I feel left out of conversations because of my voice 1   My voice problem causes me to lose income 3   I feel as though I have to strain to produce voice 2   The clarity of my voice is unpredictable 1   My voice problem upsets me 2   My voice makes me feel handicapped 0   People ask, \"What's wrong with your voice?\" 2   VHI-10 16       Patient Supplied Answers To CSI Questionnaire  No flowsheet data found.    Patient Supplied Answers To EAT Questionnaire  No flowsheet data found.      OBJECTIVE FINDINGS  PERCEPTUAL EVALUATION (CPT 69277)  POSTURE / TENSION:     upper body    neck and shoulders    BREATHING:     appears within normal limits and adequate for walking and at rest    clavicular    Not consistently coordinated for speech    Provoked a cough    LARYNGEAL PALPATION:     Denies any significant tension or discomfort at the thyrohyoid space.    She reports that     VOICE:    Roughness: Mild to moderate    Breathiness: WNL    Strain: Mild Intermittent    Loudness    Conversational speech:  WNL    Projected speech:  WNL    Pitch:    Conversational speech:  Mildly lowered    Pitch glide: neurologially normal    Resonance:    Conversational speech:  laryngeal pharyngeal resonance    Singing vs. Speech:  n/a    CAPE-V Overall Severity:  35/100    COUGH/THROAT CLEARING:    Occasional    Dry    LARYNGEAL FUNCTION STUDIES (CPT 57045)  Not completed due to COVID 19    LARYNGEAL EXAMINATION  Recommended to complete in clinic with Dr. Talbert    THERAPY PROBES: Improvement was elicited with use of forward resonant stimuli; glides, and techniques to improve flow phonation resulted in a worse voice quality.    IMPRESSIONS/ RECOMMENDATIONS/ PLAN  IMPRESSIONS: Swetha Patterson is a 50 year old female, presenting today for " follow-up with R49.0 (Dysphonia), R05 (Chronic Cough), F45.8 (Globus Sensation) and R07.0 (Throat Pain).    STIMULABILITY: results of therapy probes during perceptual and laryngeal evaluation demonstrate improvement with forward resonant stimuli; glides, and techniques to improve flow phonation resulted in a worse voice quality.    RECOMMENDATIONS:     Begin speech therapy    In-person appt for a scope    CT scan of the neck    DURATION / FREQUENCY: 3 biweekly one-hour sessions, followed by 2 one-hour biweekly sessions.  A total of 6-8 sessions may be necessary.    GOALS:  Patient goal:   2. To improve and maintain a healthy voice quality  3. To resolve the vocal fold lesions  4. To understand the problem and fix it as much as possible  5. To reduce her cough to acceptable levels  6. To breathe normally and comfortably in all situations    Short-term goal(s): Within the first 4 sessions, Ms. Patterson:  1. will be able to demonstrate provided cough suppression and substitution strategies from memory independently with 90% accuracy  2. will be able to independently list key factors in maintenance of good vocal hygiene with 80% accuracy, and report on their use outside the therapy room.  3. will demonstrate semi-occluded vocal tract (SOVT) exercises with at least 80% accuracy with no clinician support    Long-term goal(s): In 6 months, Ms. Patterson will:  1. Report a week with no more than 2 episodes of coughing, that do not last more than 2 seconds  2. Report resolution of dysphonia, and use of optimal voice quality to meet personal, social, and professional needs, 90% of the time during a typical week of vocal activities    This treatment plan was developed with the patient who agreed with the recommendations.    Certification date from: 8/24/20  Certification date to: 11/22/20      TOTAL SERVICE TIME:   Call Initiated at: 10:32 AM  Call Ended at: 11:00    EVALUATION OF VOICE AND RESONANCE (88233)  NO CHARGE FACILITY FEE  (99591)    Dianne Medel M.M. (voice), M.A., CCC/SLP  Speech-Language Pathologist  Certificate of Vocology  [facility]  [phone]  [email or web address]  Prounouns: she/her      *this report was created in part through the use of computerized dictation software, and though reviewed following completion, some typographic errors may persist.  If there is confusion regarding any of this notes contents, please contact me for clarification                                                                                                           Outpatient Speech Language Therapy Evaluation  PLAN OF TREATMENT FOR OUTPATIENT REHABILITATION  (COMPLETE FOR INITIAL CLAIMS ONLY)  Patient's Last Name, First Name, M.I.  YOB: 1970  Swetha Patterson                        Provider's Name  SATYA Hillman Medical Record No.  [id number]                               Onset Date:  8/24/20   Start of Care Date: 8/24/20     Type: Speech Language Therapy Medical Diagnosis: Chronic cough [R05]                        Therapy Diagnosis:  Chronic cough [R05]   Visits from SOC:  1   _________________________________________________________________________________  Plan of Treatment:   Speech therapy    DURATION/FREQUENCY OF TREATMENT  Six weekly, one-hour sessions, with two monthly one-hour follow-up sessions    PROGNOSIS  Good prognosis for voice improvement with speech therapy and regular practice of therapeutic activities.    BARRIERS TO LEARNING/TEACHING AND LEARNING NEEDS  None/Unremarkable    GOALS:  Patient goal:   7. To improve and maintain a healthy voice quality  8. To resolve the vocal fold lesions  9. To understand the problem and fix it as much as possible  10. To reduce her cough to acceptable levels  11. To breathe normally and comfortably in all situations     Short-term goal(s): Within the first 4 sessions, Ms. Patterson:  1. will be able to demonstrate provided cough suppression and  substitution strategies from memory independently with 90% accuracy  2. will be able to independently list key factors in maintenance of good vocal hygiene with 80% accuracy, and report on their use outside the therapy room.  3. will demonstrate semi-occluded vocal tract (SOVT) exercises with at least 80% accuracy with no clinician support     Long-term goal(s): In 6 months, Ms. Patterson will:  1. Report a week with no more than 2 episodes of coughing, that do not last more than 2 seconds  Report resolution of dysphonia, and use of optimal voice quality to meet personal, social, and professional needs, 90% of the time during a typical week of vocal activities  _________________________________________________________________________________    I CERTIFY THE NEED FOR THESE SERVICES FURNISHED UNDER        THIS PLAN OF TREATMENT AND WHILE UNDER MY CARE     (Physician attestation of this document indicates review and certification of the therapy plan).     Certification date from: 8/24/20  Certification date to: 11/22/20    Referring Provider: No ref. provider found         Again, thank you for allowing me to participate in the care of your patient.      Sincerely,    Dianne Medel, SLP

## 2020-08-24 NOTE — TELEPHONE ENCOUNTER
Called patient to schedule two return SLP appointments with 14 days in between, per inbasket from Dianne Medel:    Appointment Type - Video Visit Return / Telephone Visit Return  Provider - Dianne Medel  Appointment Notes - Return SLP voice    LVM with scheduling instructions and the ENT call center number. Patient should ideally be scheduled for 2 appointments with 14 days in between. If this is not feasible given provider's schedule, ok to schedule 14+ days apart and waitlist for earlier availability.

## 2020-08-24 NOTE — TELEPHONE ENCOUNTER
[facility] Call Center    Phone Message    May a detailed message be left on voicemail: yes     Reason for Call: Other: Pt states she received a message from clinic on moving appts to earlier time today; she is currently scheduled at 10:30 with both Nitish and SLP Dianne Medel. Writer did not see documentation from clinic on when to move these appts to. Please advise.    Action Taken: Message routed to:  [facility] E[facility]    Travel Screening: Not Applicable

## 2020-08-24 NOTE — PROGRESS NOTES
"Swetha Patterson is a 50-year-old female who is being evaluated via a billable video visit.      The patient has been notified of following:     \"This video visit will be conducted via a call between you and your physician/provider. We have found that certain health care needs can be provided without the need for an in-person physical exam.  This service lets us provide the care you need with a video conversation.  If a prescription is necessary we can send it directly to your pharmacy.  If lab work is needed we can place an order for that and you can then stop by our lab to have the test done at a later time.    Video visits are billed at different rates depending on your insurance coverage.  Please reach out to your insurance provider with any questions.    If during the course of the call the physician/provider feels a video visit is not appropriate, you will not be charged for this service.\"    Patient has given verbal consent for Video visit? Yes  How would you like to obtain your AVS? MyChart  If you are dropped from the video visit, the video invite should be resent to: Text to cell phone: [phone]   Will anyone else be joining your video visit? No        Video-Visit Details    Type of service:  Video Visit    Video Start Time: ~10:40, multiple attempts     During this virtual visit the patient is located in [place], patient verifies this as the location during the entirety of this visit.       Dear Colleague:    I had the pleasure of meeting Swetha Patterson in consultation at the [facility] the [facility] on a self-referred basis, for evaluation of multiple throat concerns. The note from our visit follows. Speech recognition software may have been used in the documentation below; input is reviewed before signature to the best of my ability. I appreciate the opportunity to participate in the care of this pleasant patient.    Please feel free to contact me with any " questions.    Sincerely yours,    Lis Talbert M.D., M.P.H.  , Laryngology  Director, [facility]  [facility]  Otolaryngology- Head & Neck Surgery  [phone]          =====    HISTORY OF PRESENT ILLNESS:   Swetha Patterson is a pleasant 50-year-old female with past medical history including anxiety, chronic lung disease (asthma, emphysema, chronic bronchitis), mediastinal cyst, opioid dependence, reflux and tobacco exposure who presents with a greater than one year history of throat/neck concerns.     She was last seen 8/18/2017 and was subsequently lost to follow up related to competing life challenges and deaths in the family. 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 Speech therapy and tobacco cessation were recommended. Prior to that time she had been seen by Dr. Mayen as well as at [facility].      Voice  Her voice was doing okay for most of the time in between, but it has been worsening over the past four months.  It cuts in and out with talking.  She notes it can vary in quality, but feels like it is worse all the time now.  She notes about half the day she is talking, including on the phone. Voice use makes it worse. It is better in the mornings. Voice use stirs up her cough.      Swallowing  She is seeing Gastroenterology regarding swallow concerns over the past six to seven months. Solids are more difficult than liquids, and requires effort to swallow.  She also notes pain in her upper abdomen that can be quite severe. She also reports substantial reflux/regurgitation if she bends over.  She is not really having difficulty with things getting down the wrong tube.  A swallow study is pending.      Cough/Throat-clearing  She is having coughing fits associated with throat discomfort over the past several  months.      Breathing  She has some chronic breathing problems as above.  She reports that recent PFTs showed some mild progression.  She finds it harder to breathe out, and it is sometimes noisy on the way in.      Throat discomfort  She also is noting right throat discomfort that triggers coughing.  Her discomfort can be worsened by voice use.      Reflux-type symptoms  She experiences heartburn/indigestion daily. She is taking reflux medications. Gastroenterology evaluation is ongoing as noted above.      Other  She has a lump on the left side of her upper chest that is not painful unless palpated, which she started to notice over the past 1.5 years.  It seems to have enlarged initially, but now seems to have stabilized.  No prior imaging.      Prior Epic/outside records were reviewed for this visit. She did quit smoking after last seen, for about six months. She just started Chantix again to try to stop smoking. She has had a lot of deaths in her family lately which is stressful.      MEDICATIONS:     Current Outpatient Medications   Medication Sig Dispense Refill     acetaminophen (TYLENOL) 325 MG tablet Take 3 tablets (975 mg) by mouth every 6 hours as needed for pain 50 tablet 0     albuterol (PROAIR HFA/PROVENTIL HFA/VENTOLIN HFA) 108 (90 Base) MCG/ACT inhaler Inhale 2 puffs into the lungs every 6 hours as needed for shortness of breath / dyspnea 18 g 3     albuterol (PROVENTIL) (2.5 MG/3ML) 0.083% neb solution Take 1 vial (2.5 mg) by nebulization every 6 hours as needed for shortness of breath / dyspnea or wheezing 25 vial 3     amLODIPine (NORVASC) 5 MG tablet Take 1 tablet (5 mg) by mouth daily 90 tablet 0     atorvastatin (LIPITOR) 80 MG tablet Take 1 tablet (80 mg) by mouth daily 90 tablet 0     benzoyl peroxide 10 % EX external gel Apply topically 2 times daily 90 g 3     blood glucose (ACCU-CHEK ALLYSON PLUS) test strip USE TO TEST BLOOD SUGAR ONE TIME DAILY OR AS DIRECTED 100 each 0     blood glucose  (NO BRAND SPECIFIED) lancets standard Use to test blood sugar 1 time daily 100 each 1     blood glucose monitoring (NO BRAND SPECIFIED) meter device kit Use to test blood sugar 1 times daily or as directed. 1 kit 0     budesonide-formoterol (SYMBICORT) 160-4.5 MCG/ACT IN Inhaler Inhale 2 puffs into the lungs 2 times daily 3 Inhaler 1     buPROPion 150 MG PO 24 hr tablet Take 1 tablet (150 mg) by mouth every morning 90 tablet 1     cetirizine HCl 10 MG CAPS Take 1 capsule (10 mg) by mouth daily as needed Takes in the spring. 90 capsule 3     clindamycin 1 % EX external gel Apply topically 2 times daily 60 g 3     desonide (DESOWEN) 0.05 % external cream Apply topically as needed (rash) 60 g 0     doxycycline hyclate (VIBRA-TABS) 100 MG tablet Take 1 tablet (100 mg) by mouth 2 times daily 180 tablet 0     fluticasone 50 MCG/ACT NA nasal spray Spray 2 sprays in nostril daily 16 g 5     furosemide (LASIX) 20 MG tablet Take 1 tablet (20 mg) by mouth daily 90 tablet 0     glipiZIDE (GLUCOTROL XL) 2.5 MG 24 hr tablet Take 1 tablet (2.5 mg) by mouth daily 30 tablet 1     hydrOXYzine (ATARAX) 50 MG tablet TAKE TWO TABLETS BY MOUTH THREE TIMES A  tablet 0     ibuprofen (ADVIL/MOTRIN) 600 MG tablet Take 1 tablet (600 mg) by mouth every 6 hours as needed for pain 30 tablet 0     lamoTRIgine (LAMICTAL) 100 MG tablet        lisinopril (ZESTRIL) 20 MG tablet Take 1 tablet (20 mg) by mouth daily 90 tablet 0     metFORMIN (GLUCOPHAGE) 500 MG tablet Take 1 tablet (500 mg) by mouth daily (with breakfast) 90 tablet 0     metroNIDAZOLE (METROGEL) 0.75 % external gel Apply topically 2 times daily 45 g 1     montelukast (SINGULAIR) 10 MG tablet TAKE ONE TABLET BY MOUTH AT BEDTIME 90 tablet 0     nicotine (EQ NICOTINE POLACRILEX) 2 MG lozenge Place 1 lozenge (2 mg) inside cheek every hour as needed for smoking cessation 360 lozenge 1     nicotine (NICORETTE) 2 MG gum Place 1 each (2 mg) inside cheek as needed for smoking cessation  150 each 1     nicotine 14 MG/24HR TD 24 hr patch Place 1 patch onto the skin every 24 hours 30 patch 1     nitroGLYcerin (NITROSTAT) 0.4 MG sublingual tablet FOR CHEST PAIN PLACE ONE TABLET UNDER THE TONGUE EVERY 5 MINUTES FOR 3 DOSES.  IF SYPTOMS PERSIST 5 MINUTES AFTER 1ST DOSE CALL 911 25 tablet 0     nortriptyline (PAMELOR) 10 MG capsule Take 3 capsules (30 mg) by mouth At Bedtime 30 capsule 4     nystatin 266328 UNIT/GM EX external powder Apply topically 2 times daily as needed (skin rash) 60 g 11     omeprazole (PRILOSEC) 20 MG DR capsule Take 1 capsule (20 mg) by mouth 2 times daily 180 capsule 1     omeprazole 20 MG tablet Take 1 tablet (20 mg) by mouth 2 times daily 180 tablet 1     ondansetron (ZOFRAN) 8 MG tablet Take 0.5-1 tablets (4-8 mg) by mouth every 8 hours as needed for nausea 30 tablet 1     order for DME Please provide oximeter 1 Device 0     order for DME Equipment being ordered: Digital home blood pressure monitor kit 1 Device 0     order for DME Equipment being ordered: Pulse Oxymeter 1 Device 0     predniSONE (DELTASONE) 20 MG tablet Take 1 tablet (20 mg) by mouth daily 7 tablet 0     senna-docusate (SENOKOT-S/PERICOLACE) 8.6-50 MG tablet Take 1-2 tablets by mouth 2 times daily Use while taking narcotic pain medication to prevent constipation. 10 tablet 0     sulfamethoxazole-trimethoprim (BACTRIM DS) 800-160 MG tablet Take 1 tablet by mouth 2 times daily Take one tablet twice daily. 60 tablet 3     tiotropium (SPIRIVA HANDIHALER) 18 MCG IN inhaled capsule Inhale contents of one capsule daily. 90 capsule 3     varenicline (CHANTIX) 1 MG tablet Take 1 tablet (1 mg) by mouth 2 times daily 60 tablet 3       ALLERGIES:    Allergies   Allergen Reactions     Codeine Nausea and Vomiting     vomiting     Cyclobenzaprine Nausea     Hydrocodone Nausea and Vomiting     Sulfa Drugs Nausea and Vomiting     Nausea     Gabapentin Rash       PAST MEDICAL HISTORY:   Past Medical History:   Diagnosis Date      "Anxiety state, unspecified      Asthma      Chronic pain     back pain from cyst     Contact dermatitis and other eczema, due to unspecified cause      COPD (chronic obstructive pulmonary disease) (H)      Depressive disorder, not elsewhere classified      Emphysema with chronic bronchitis (H)      Esophageal reflux      Family history of ischemic heart disease      Gastro-oesophageal reflux disease      History of emphysema      Hoarseness      HTN, goal below 140/90      Hyperlipidemia LDL goal <130      Liver disease     \"fatty liver\"     Other chronic pain     chest, from coughing     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)      PONV (postoperative nausea and vomiting)         PAST SURGICAL HISTORY:   Past Surgical History:   Procedure Laterality Date     ARTHROSCOPY SHOULDER DECOMPRESSION Left 10/21/2015    Procedure: ARTHROSCOPY SHOULDER DECOMPRESSION;  Surgeon: Julien Milian MD;  Location: [facility] OR     BIOPSY ARTERY TEMPORAL Left 3/11/2020    Procedure: LEFT TEMPORAL ARTERY BIOPSY;  Surgeon: Ibeth oCnner MD;  Location: [facility] OR     BRONCHIAL THERMOPLASTY N/A 11/14/2014    Procedure: BRONCHIAL THERMOPLASTY;  Surgeon: Ward Whitaker MD;  Location: [facility]     BRONCHIAL THERMOPLASTY N/A 12/19/2014    Procedure: BRONCHIAL THERMOPLASTY;  Surgeon: Ward Whitaker MD;  [facility]: [facility]     BRONCHIAL THERMOPLASTY N/A 2/6/2015    Procedure: BRONCHIAL THERMOPLASTY;  Surgeon: Ward Whitaker MD;  Location: [facility]     C APPENDECTOMY  at age 17     COLONOSCOPY N/A 11/26/2018    Procedure: COLONOSCOPY ([facility]);  Surgeon: Marshall Oakes MD;  Location: [facility] OR     DISCECTOMY, FUSION CERVICAL ANTERIOR ONE LEVEL, COMBINED N/A 5/1/2018    Procedure: COMBINED DISCECTOMY, FUSION CERVICAL ANTERIOR ONE LEVEL;  1.  C5-C6 anterior cervical diskectomy and fusion.    2.  C5-C6 application of intervertebral biomechanical device for interbody fusion purposes.    3.  C5-C6 anterior instrumentation using the " standard Kristin InViZia 24 mm plate with four associated bone screws, 12 mm in length.  Inferior screws are fixed.  Superior screws are va     ENT SURGERY  2015    polyps removed from vocal cords      EXCISE NODE MEDIASTINAL  4/26/2013    Procedure: EXCISE NODE MEDIASTINAL;;  Surgeon: Av Peña MD;  Location:  OR     LAPAROSCOPIC CHOLECYSTECTOMY N/A 10/19/2017    Procedure: LAPAROSCOPIC CHOLECYSTECTOMY;  LAPAROSCOPIC CHOLECYSTECTOMY;  Surgeon: Ney Jerry MD;  Location: [facility] OR     THORACOSCOPY  4/26/2013    Procedure: THORACOSCOPY;  LEFT VIDEO ASSISTED THORACOSCOPY, RESECTION OF POSTERIOR MEDIASTINAL MASS;  Surgeon: Av Peña MD;  Location:  OR     TONSILLECTOMY  as a kid     TONSILLECTOMY         HABITS/SOCIAL HISTORY:    Social History     Tobacco Use     Smoking status: Current Every Day Smoker     Years: 30.00     Types: Cigarettes     Smokeless tobacco: Never Used     Tobacco comment: 2 cigs a day   Substance Use Topics     Alcohol use: No     Alcohol/week: 0.0 standard drinks         FAMILY HISTORY:    Family History   Problem Relation Age of Onset     Heart Disease Mother         murmur, mi     Hypertension Mother      Cerebrovascular Disease Mother      Depression Mother      Gallbladder Disease Mother      Asthma Mother      Family History Negative Father         does not know  him     Family History Negative Brother      Heart Disease Maternal Grandfather      Asthma Maternal Grandfather      Hypertension Maternal Grandfather      Cerebrovascular Disease Maternal Grandfather      Diabetes Maternal Grandfather      Asthma Sister      Hypertension Sister      Cerebrovascular Disease Sister      Hypertension Maternal Grandmother      Cerebrovascular Disease Maternal Grandmother         REVIEW OF SYSTEMS:  The patient completed a comprehensive 11 point review of systems (below), which was reviewed. Positives are as noted below; pertinent findings are as noted in the HPI.      Patient Supplied Answers to Review of Systems   ENT ROS 8/19/2020   Constitutional Problems with sleep   Neurology Numbness, Unexplained weakness   Psychology Frequently feeling anxious   Eyes -   Ears, Nose, Throat Nasal congestion or drainage, Trouble swallowing, Hoarseness   Cardiopulmonary Cough, Breathing problems, Wheezing   Gastrointestinal/Genitourinary Heartburn/indigestion, Unexplained nausea/vomiting, Diarrhea   Musculoskeletal Sore or stiff joints, Back pain   Allergy/Immunology Allergies or hay fever   Hematologic -   Endocrine Thirst            PHYSICAL EXAMINATION:  General: The patient was alert and conversant, and in no acute distress.  Patient completed visit while outdoors, by a storefront.  Eyes: EOM normal, Conjunctiva and lids normal.  Nose: Grossly unremarkable; no  apparent bleeding.   Oral cavity/oropharynx: No visible anterior masses or lesions. Tongue mobility intact and symmetric.  Ears: Normal auricles bilaterally.  Neck: No appreciable cervical masses, but thick neck. Good neck extension and rotation. Possible tenderness in thyrohyoid region, difficult to localize.  Resp: Breathing comfortably, no stridor or stertor.  Neuro: Symmetric facial function. Other cranial nerves as documented above.  Psych: Normal affect, pleasant and cooperative.  Voice/speech: Mild to moderate dysphonia characterized by roughness, strain and low Fo. Pitch glide: intact. MPT: ~7s  Extremities: No cyanosis, clubbing, or edema of the upper extremities.    Speech pathologist present: Dianne Medel MM, MA, CCC-SLP     Intake scores  Total Score for Last Patient-Answered VHI Questionnaire  VHI Total Score 8/18/2017   VHI Total Score 32     Total Score for Last Patient-Answered EAT Questionnaire  EAT Total Score 8/18/2017   EAT Total Score 17     Total Score for Last Patient-Answered CSI Questionnaire  CSI Total Score 8/18/2017   CSI Total Score 39       PROCEDURE: Deferred; telemedicine      LABS:  "reviewed    IMAGING/RESULTS reviewed, with pertinent report excerpts below:  EXAM: XR CHEST 2 VW  7/31/2020   \"IMPRESSION: Negative chest x-ray\"    EGD 8/24/18 (most recent available for my review)  -normal esophagus  -gastritis  -normal duodenum    PFTs 7/31/2020:  The FVC, FEV1 and FEV1/FVC ratio are normal. TLC is normal, however RV and RV/TLC are increased.   \"IMPRESSION:   Normal spirometry.   Lung volumes suggest air trapping.\"     IMPRESSION AND PLAN:   Swetha Patterson presents with multiple throat concerns, including dysphonia, dysphagia, throat discomfort, and chronic cough, and an upper chest/low neck mass on the left. She is also working with Gastroenterology and has a swallow study pending. Her pulmonary status is slightly worse than when last seen, but no major changes by her report. She is working on smoking cessation.     We will need to complete a laryngoscopy/stroboscopy in the office and will get this scheduled.    I recommended speech therapy, with goals including improving laryngeal hygiene, reducing laryngeal irritability, improving laryngeal efficiency and improving respiratory/phonatory coordination.     We will also plan a neck/chest CT scan with contrast to assess her reported left upper chest/lower neck mass.    I appreciate the opportunity to participate in the care of this pleasant patient.           Video End Time: 11:15 AM    Originating Location (pt. Location): Other : storefront    Distant Location (provider location):  [facility]     Platform used for Video Visit: [email or web address] ([employer] did not work for our connection)    Lis Talbert MD        "

## 2020-08-24 NOTE — PROGRESS NOTES
Outpatient Speech Language Therapy Evaluation  PLAN OF TREATMENT FOR OUTPATIENT REHABILITATION  (COMPLETE FOR INITIAL CLAIMS ONLY)  Patient's Last Name, First Name, M.I.  YOB: 1970  Swetha Patterson                        Provider's Name  Dianne Medel, SLP Medical Record No.  [id number]                               Onset Date:  8/24/20   Start of Care Date: 8/24/20     Type: Speech Language Therapy Medical Diagnosis: Chronic cough [R05]                        Therapy Diagnosis:  Chronic cough [R05]   Visits from SOC:  1   _________________________________________________________________________________  Plan of Treatment:   Speech therapy    DURATION/FREQUENCY OF TREATMENT  Six weekly, one-hour sessions, with two monthly one-hour follow-up sessions    PROGNOSIS  Good prognosis for voice improvement with speech therapy and regular practice of therapeutic activities.    BARRIERS TO LEARNING/TEACHING AND LEARNING NEEDS  None/Unremarkable    GOALS:  Patient goal:   1. To improve and maintain a healthy voice quality  2. To resolve the vocal fold lesions  3. To understand the problem and fix it as much as possible  4. To reduce her cough to acceptable levels  5. To breathe normally and comfortably in all situations     Short-term goal(s): Within the first 4 sessions, Ms. Patterson:  1. will be able to demonstrate provided cough suppression and substitution strategies from memory independently with 90% accuracy  2. will be able to independently list key factors in maintenance of good vocal hygiene with 80% accuracy, and report on their use outside the therapy room.  3. will demonstrate semi-occluded vocal tract (SOVT) exercises with at least 80% accuracy with no clinician support     Long-term goal(s): In 6 months, Ms. Patterson will:  1. Report a week with no more than 2 episodes of coughing, that do not last more than 2  seconds  Report resolution of dysphonia, and use of optimal voice quality to meet personal, social, and professional needs, 90% of the time during a typical week of vocal activities  _________________________________________________________________________________    I CERTIFY THE NEED FOR THESE SERVICES FURNISHED UNDER        THIS PLAN OF TREATMENT AND WHILE UNDER MY CARE     (Physician attestation of this document indicates review and certification of the therapy plan).     Certification date from: 8/24/20  Certification date to: 11/22/20    Referring Provider: No ref. provider found

## 2020-08-24 NOTE — LETTER
"8/24/2020      RE: Swetha Patterson  [address]  [facility] [address]       Swetha Patterson is a 50-year-old female who is being evaluated via a billable video visit.      The patient has been notified of following:     \"This video visit will be conducted via a call between you and your physician/provider. We have found that certain health care needs can be provided without the need for an in-person physical exam.  This service lets us provide the care you need with a video conversation.  If a prescription is necessary we can send it directly to your pharmacy.  If lab work is needed we can place an order for that and you can then stop by our lab to have the test done at a later time.    Video visits are billed at different rates depending on your insurance coverage.  Please reach out to your insurance provider with any questions.    If during the course of the call the physician/provider feels a video visit is not appropriate, you will not be charged for this service.\"    Patient has given verbal consent for Video visit? Yes  How would you like to obtain your AVS? MyChart  If you are dropped from the video visit, the video invite should be resent to: Text to cell phone: [phone]   Will anyone else be joining your video visit? No        Video-Visit Details    Type of service:  Video Visit    Video Start Time: ~10:40, multiple attempts     During this virtual visit the patient is located in [place], patient verifies this as the location during the entirety of this visit.       Dear Colleague:    I had the pleasure of meeting Swetha Patterson in consultation at the [facility] the [facility] on a self-referred basis, for evaluation of multiple throat concerns. The note from our visit follows. Speech recognition software may have been used in the documentation below; input is reviewed before signature to the best of my ability. I appreciate the opportunity to " participate in the care of this pleasant patient.    Please feel free to contact me with any questions.    Sincerely yours,    Lis Talbert M.D., M.P.H.  , Laryngology  Director, [facility]  [facility]  Otolaryngology- Head & Neck Surgery  [phone]          =====    HISTORY OF PRESENT ILLNESS:   Swetha Patterson is a pleasant 50-year-old female with past medical history including anxiety, chronic lung disease (asthma, emphysema, chronic bronchitis), mediastinal cyst, opioid dependence, reflux and tobacco exposure who presents with a greater than one year history of throat/neck concerns.     She was last seen 8/18/2017 and was subsequently lost to follow up related to competing life challenges and deaths in the family. 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 Speech therapy and tobacco cessation were recommended. Prior to that time she had been seen by Dr. Mayen as well as at [facility].      Voice  Her voice was doing okay for most of the time in between, but it has been worsening over the past four months.  It cuts in and out with talking.  She notes it can vary in quality, but feels like it is worse all the time now.  She notes about half the day she is talking, including on the phone. Voice use makes it worse. It is better in the mornings. Voice use stirs up her cough.      Swallowing  She is seeing Gastroenterology regarding swallow concerns over the past six to seven months. Solids are more difficult than liquids, and requires effort to swallow.  She also notes pain in her upper abdomen that can be quite severe. She also reports substantial reflux/regurgitation if she bends over.  She is not really having difficulty with things getting down the wrong tube.  A swallow study is  pending.      Cough/Throat-clearing  She is having coughing fits associated with throat discomfort over the past several months.      Breathing  She has some chronic breathing problems as above.  She reports that recent PFTs showed some mild progression.  She finds it harder to breathe out, and it is sometimes noisy on the way in.      Throat discomfort  She also is noting right throat discomfort that triggers coughing.  Her discomfort can be worsened by voice use.      Reflux-type symptoms  She experiences heartburn/indigestion daily. She is taking reflux medications. Gastroenterology evaluation is ongoing as noted above.      Other  She has a lump on the left side of her upper chest that is not painful unless palpated, which she started to notice over the past 1.5 years.  It seems to have enlarged initially, but now seems to have stabilized.  No prior imaging.      Prior Epic/outside records were reviewed for this visit. She did quit smoking after last seen, for about six months. She just started Chantix again to try to stop smoking. She has had a lot of deaths in her family lately which is stressful.      MEDICATIONS:     Current Outpatient Medications   Medication Sig Dispense Refill     acetaminophen (TYLENOL) 325 MG tablet Take 3 tablets (975 mg) by mouth every 6 hours as needed for pain 50 tablet 0     albuterol (PROAIR HFA/PROVENTIL HFA/VENTOLIN HFA) 108 (90 Base) MCG/ACT inhaler Inhale 2 puffs into the lungs every 6 hours as needed for shortness of breath / dyspnea 18 g 3     albuterol (PROVENTIL) (2.5 MG/3ML) 0.083% neb solution Take 1 vial (2.5 mg) by nebulization every 6 hours as needed for shortness of breath / dyspnea or wheezing 25 vial 3     amLODIPine (NORVASC) 5 MG tablet Take 1 tablet (5 mg) by mouth daily 90 tablet 0     atorvastatin (LIPITOR) 80 MG tablet Take 1 tablet (80 mg) by mouth daily 90 tablet 0     benzoyl peroxide 10 % EX external gel Apply topically 2 times daily 90 g 3     blood  glucose (ACCU-CHEK ALLYSON PLUS) test strip USE TO TEST BLOOD SUGAR ONE TIME DAILY OR AS DIRECTED 100 each 0     blood glucose (NO BRAND SPECIFIED) lancets standard Use to test blood sugar 1 time daily 100 each 1     blood glucose monitoring (NO BRAND SPECIFIED) meter device kit Use to test blood sugar 1 times daily or as directed. 1 kit 0     budesonide-formoterol (SYMBICORT) 160-4.5 MCG/ACT IN Inhaler Inhale 2 puffs into the lungs 2 times daily 3 Inhaler 1     buPROPion 150 MG PO 24 hr tablet Take 1 tablet (150 mg) by mouth every morning 90 tablet 1     cetirizine HCl 10 MG CAPS Take 1 capsule (10 mg) by mouth daily as needed Takes in the spring. 90 capsule 3     clindamycin 1 % EX external gel Apply topically 2 times daily 60 g 3     desonide (DESOWEN) 0.05 % external cream Apply topically as needed (rash) 60 g 0     doxycycline hyclate (VIBRA-TABS) 100 MG tablet Take 1 tablet (100 mg) by mouth 2 times daily 180 tablet 0     fluticasone 50 MCG/ACT NA nasal spray Spray 2 sprays in nostril daily 16 g 5     furosemide (LASIX) 20 MG tablet Take 1 tablet (20 mg) by mouth daily 90 tablet 0     glipiZIDE (GLUCOTROL XL) 2.5 MG 24 hr tablet Take 1 tablet (2.5 mg) by mouth daily 30 tablet 1     hydrOXYzine (ATARAX) 50 MG tablet TAKE TWO TABLETS BY MOUTH THREE TIMES A  tablet 0     ibuprofen (ADVIL/MOTRIN) 600 MG tablet Take 1 tablet (600 mg) by mouth every 6 hours as needed for pain 30 tablet 0     lamoTRIgine (LAMICTAL) 100 MG tablet        lisinopril (ZESTRIL) 20 MG tablet Take 1 tablet (20 mg) by mouth daily 90 tablet 0     metFORMIN (GLUCOPHAGE) 500 MG tablet Take 1 tablet (500 mg) by mouth daily (with breakfast) 90 tablet 0     metroNIDAZOLE (METROGEL) 0.75 % external gel Apply topically 2 times daily 45 g 1     montelukast (SINGULAIR) 10 MG tablet TAKE ONE TABLET BY MOUTH AT BEDTIME 90 tablet 0     nicotine (EQ NICOTINE POLACRILEX) 2 MG lozenge Place 1 lozenge (2 mg) inside cheek every hour as needed for smoking  cessation 360 lozenge 1     nicotine (NICORETTE) 2 MG gum Place 1 each (2 mg) inside cheek as needed for smoking cessation 150 each 1     nicotine 14 MG/24HR TD 24 hr patch Place 1 patch onto the skin every 24 hours 30 patch 1     nitroGLYcerin (NITROSTAT) 0.4 MG sublingual tablet FOR CHEST PAIN PLACE ONE TABLET UNDER THE TONGUE EVERY 5 MINUTES FOR 3 DOSES.  IF SYPTOMS PERSIST 5 MINUTES AFTER 1ST DOSE CALL 911 25 tablet 0     nortriptyline (PAMELOR) 10 MG capsule Take 3 capsules (30 mg) by mouth At Bedtime 30 capsule 4     nystatin [id number] UNIT/GM EX external powder Apply topically 2 times daily as needed (skin rash) 60 g 11     omeprazole (PRILOSEC) 20 MG DR capsule Take 1 capsule (20 mg) by mouth 2 times daily 180 capsule 1     omeprazole 20 MG tablet Take 1 tablet (20 mg) by mouth 2 times daily 180 tablet 1     ondansetron (ZOFRAN) 8 MG tablet Take 0.5-1 tablets (4-8 mg) by mouth every 8 hours as needed for nausea 30 tablet 1     order for DME Please provide oximeter 1 Device 0     order for DME Equipment being ordered: Digital home blood pressure monitor kit 1 Device 0     order for DME Equipment being ordered: Pulse Oxymeter 1 Device 0     predniSONE (DELTASONE) 20 MG tablet Take 1 tablet (20 mg) by mouth daily 7 tablet 0     senna-docusate (SENOKOT-S/PERICOLACE) 8.6-50 MG tablet Take 1-2 tablets by mouth 2 times daily Use while taking narcotic pain medication to prevent constipation. 10 tablet 0     sulfamethoxazole-trimethoprim (BACTRIM DS) 800-160 MG tablet Take 1 tablet by mouth 2 times daily Take one tablet twice daily. 60 tablet 3     tiotropium (SPIRIVA HANDIHALER) 18 MCG IN inhaled capsule Inhale contents of one capsule daily. 90 capsule 3     varenicline (CHANTIX) 1 MG tablet Take 1 tablet (1 mg) by mouth 2 times daily 60 tablet 3       ALLERGIES:    Allergies   Allergen Reactions     Codeine Nausea and Vomiting     vomiting     Cyclobenzaprine Nausea     Hydrocodone Nausea and Vomiting     Sulfa Drugs  "Nausea and Vomiting     Nausea     Gabapentin Rash       PAST MEDICAL HISTORY:   Past Medical History:   Diagnosis Date     Anxiety state, unspecified      Asthma      Chronic pain     back pain from cyst     Contact dermatitis and other eczema, due to unspecified cause      COPD (chronic obstructive pulmonary disease) (H)      Depressive disorder, not elsewhere classified      Emphysema with chronic bronchitis (H)      Esophageal reflux      Family history of ischemic heart disease      Gastro-oesophageal reflux disease      History of emphysema      Hoarseness      HTN, goal below 140/90      Hyperlipidemia LDL goal <130      Liver disease     \"fatty liver\"     Other chronic pain     chest, from coughing     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)      PONV (postoperative nausea and vomiting)         PAST SURGICAL HISTORY:   Past Surgical History:   Procedure Laterality Date     ARTHROSCOPY SHOULDER DECOMPRESSION Left 10/21/2015    Procedure: ARTHROSCOPY SHOULDER DECOMPRESSION;  Surgeon: Julien Milian MD;  Location: [facility] OR     BIOPSY ARTERY TEMPORAL Left 3/11/2020    Procedure: LEFT TEMPORAL ARTERY BIOPSY;  Surgeon: Ibeth Conner MD;  Location: [facility] OR     BRONCHIAL THERMOPLASTY N/A 11/14/2014    Procedure: BRONCHIAL THERMOPLASTY;  Surgeon: Ward Whitaker MD;  Location: [facility] GI     BRONCHIAL THERMOPLASTY N/A 12/19/2014    Procedure: BRONCHIAL THERMOPLASTY;  Surgeon: Ward Whitaker MD;  [facility]: [facility] OR     BRONCHIAL THERMOPLASTY N/A 2/6/2015    Procedure: BRONCHIAL THERMOPLASTY;  Surgeon: Ward Whitaker MD;  Location: [facility] OR     C APPENDECTOMY  at age 17     COLONOSCOPY N/A 11/26/2018    Procedure: COLONOSCOPY ([facility]);  Surgeon: Marshall Oakes MD;  Location: [facility] OR     DISCECTOMY, FUSION CERVICAL ANTERIOR ONE LEVEL, COMBINED N/A 5/1/2018    Procedure: COMBINED DISCECTOMY, FUSION CERVICAL ANTERIOR ONE LEVEL;  1.  C5-C6 anterior cervical diskectomy and fusion.    2.  C5-C6 " application of intervertebral biomechanical device for interbody fusion purposes.    3.  C5-C6 anterior instrumentation using the standard Kristin InViZia 24 mm plate with four associated bone screws, 12 mm in length.  Inferior screws are fixed.  Superior screws are va     ENT SURGERY  2015    polyps removed from vocal cords      EXCISE NODE MEDIASTINAL  4/26/2013    Procedure: EXCISE NODE MEDIASTINAL;;  Surgeon: Av Peña MD;  Location:  OR     LAPAROSCOPIC CHOLECYSTECTOMY N/A 10/19/2017    Procedure: LAPAROSCOPIC CHOLECYSTECTOMY;  LAPAROSCOPIC CHOLECYSTECTOMY;  Surgeon: Ney Jerry MD;  Location:  OR     THORACOSCOPY  4/26/2013    Procedure: THORACOSCOPY;  LEFT VIDEO ASSISTED THORACOSCOPY, RESECTION OF POSTERIOR MEDIASTINAL MASS;  Surgeon: Av Peña MD;  Location:  OR     TONSILLECTOMY  as a kid     TONSILLECTOMY         HABITS/SOCIAL HISTORY:    Social History     Tobacco Use     Smoking status: Current Every Day Smoker     Years: 30.00     Types: Cigarettes     Smokeless tobacco: Never Used     Tobacco comment: 2 cigs a day   Substance Use Topics     Alcohol use: No     Alcohol/week: 0.0 standard drinks         FAMILY HISTORY:    Family History   Problem Relation Age of Onset     Heart Disease Mother         murmur, mi     Hypertension Mother      Cerebrovascular Disease Mother      Depression Mother      Gallbladder Disease Mother      Asthma Mother      Family History Negative Father         does not know  him     Family History Negative Brother      Heart Disease Maternal Grandfather      Asthma Maternal Grandfather      Hypertension Maternal Grandfather      Cerebrovascular Disease Maternal Grandfather      Diabetes Maternal Grandfather      Asthma Sister      Hypertension Sister      Cerebrovascular Disease Sister      Hypertension Maternal Grandmother      Cerebrovascular Disease Maternal Grandmother         REVIEW OF SYSTEMS:  The patient completed a comprehensive  11 point review of systems (below), which was reviewed. Positives are as noted below; pertinent findings are as noted in the HPI.     Patient Supplied Answers to Review of Systems   ENT ROS 8/19/2020   Constitutional Problems with sleep   Neurology Numbness, Unexplained weakness   Psychology Frequently feeling anxious   Eyes -   Ears, Nose, Throat Nasal congestion or drainage, Trouble swallowing, Hoarseness   Cardiopulmonary Cough, Breathing problems, Wheezing   Gastrointestinal/Genitourinary Heartburn/indigestion, Unexplained nausea/vomiting, Diarrhea   Musculoskeletal Sore or stiff joints, Back pain   Allergy/Immunology Allergies or hay fever   Hematologic -   Endocrine Thirst            PHYSICAL EXAMINATION:  General: The patient was alert and conversant, and in no acute distress.  Patient completed visit while outdoors, by a storefront.  Eyes: EOM normal, Conjunctiva and lids normal.  Nose: Grossly unremarkable; no  apparent bleeding.   Oral cavity/oropharynx: No visible anterior masses or lesions. Tongue mobility intact and symmetric.  Ears: Normal auricles bilaterally.  Neck: No appreciable cervical masses, but thick neck. Good neck extension and rotation. Possible tenderness in thyrohyoid region, difficult to localize.  Resp: Breathing comfortably, no stridor or stertor.  Neuro: Symmetric facial function. Other cranial nerves as documented above.  Psych: Normal affect, pleasant and cooperative.  Voice/speech: Mild to moderate dysphonia characterized by roughness, strain and low Fo. Pitch glide: intact. MPT: ~7s  Extremities: No cyanosis, clubbing, or edema of the upper extremities.    Speech pathologist present: Dianne Medel, MM, MA, CCC-SLP     Intake scores  Total Score for Last Patient-Answered VHI Questionnaire  VHI Total Score 8/18/2017   VHI Total Score 32     Total Score for Last Patient-Answered EAT Questionnaire  EAT Total Score 8/18/2017   EAT Total Score 17     Total Score for Last  "Patient-Answered CSI Questionnaire  CSI Total Score 8/18/2017   CSI Total Score 39       PROCEDURE: Deferred; telemedicine      LABS: reviewed    IMAGING/RESULTS reviewed, with pertinent report excerpts below:  EXAM: XR CHEST 2 VW  7/31/2020   \"IMPRESSION: Negative chest x-ray\"    EGD 8/24/18 (most recent available for my review)  -normal esophagus  -gastritis  -normal duodenum    PFTs 7/31/2020:  The FVC, FEV1 and FEV1/FVC ratio are normal. TLC is normal, however RV and RV/TLC are increased.   \"IMPRESSION:   Normal spirometry.   Lung volumes suggest air trapping.\"     IMPRESSION AND PLAN:   Swetha Patterson presents with multiple throat concerns, including dysphonia, dysphagia, throat discomfort, and chronic cough, and an upper chest/low neck mass on the left. She is also working with Gastroenterology and has a swallow study pending. Her pulmonary status is slightly worse than when last seen, but no major changes by her report. She is working on smoking cessation.     We will need to complete a laryngoscopy/stroboscopy in the office and will get this scheduled.    I recommended speech therapy, with goals including improving laryngeal hygiene, reducing laryngeal irritability, improving laryngeal efficiency and improving respiratory/phonatory coordination.     We will also plan a neck/chest CT scan with contrast to assess her reported left upper chest/lower neck mass.    I appreciate the opportunity to participate in the care of this pleasant patient.           Video End Time: 11:15 AM    Originating Location (pt. Location): Other : storefront    Distant Location (provider location):  [facility] EAR NOSE AND THROAT     Platform used for Video Visit: Lexx (Stumpwise did not work for our connection)    Lis Talbert MD            "

## 2020-08-24 NOTE — PROGRESS NOTES
Medicare Certification  Physician Attestation   I agree with the information in this note.    Lis Talbert MD

## 2020-08-24 NOTE — Clinical Note
Medicare Certification - Add your Attestation   1. Review the Certification Documentation below   2. Click 'QuickNote' on your toolbar   3. Add P MEDICARE CERTIFICATION-[facility] as a recipient   4. Add your attestation using [email or web address] and choose Rehab Services Attestation - Physician (at the bottom)   5. Click 'Save as QuickAction' and name the Button '[facility] Rehab Cert.' Click 'Accept.' This is a onetime set up. You will now have a [facility] Rehab Cert button on the right side of the inbasket toolbar so the next time you need to add your attestation just, click the button. You will not have to go through any other steps.   6. Click 'Sign and Route'

## 2020-08-25 ENCOUNTER — VIRTUAL VISIT (OUTPATIENT)
Dept: INTERNAL MEDICINE | Facility: CLINIC | Age: 50
End: 2020-08-25
Payer: COMMERCIAL

## 2020-08-25 ENCOUNTER — TELEPHONE (OUTPATIENT)
Dept: GASTROENTEROLOGY | Facility: CLINIC | Age: 50
End: 2020-08-25

## 2020-08-25 ENCOUNTER — VIRTUAL VISIT (OUTPATIENT)
Dept: GASTROENTEROLOGY | Facility: CLINIC | Age: 50
End: 2020-08-25
Payer: COMMERCIAL

## 2020-08-25 ENCOUNTER — HOSPITAL ENCOUNTER (OUTPATIENT)
Dept: GENERAL RADIOLOGY | Facility: CLINIC | Age: 50
Discharge: HOME | End: 2020-08-25
Attending: INTERNAL MEDICINE | Admitting: INTERNAL MEDICINE
Payer: COMMERCIAL

## 2020-08-25 DIAGNOSIS — R13.19 ESOPHAGEAL DYSPHAGIA: ICD-10-CM

## 2020-08-25 DIAGNOSIS — R13.19 ESOPHAGEAL DYSPHAGIA: Primary | ICD-10-CM

## 2020-08-25 DIAGNOSIS — J44.9 COPD, MODERATE (H): ICD-10-CM

## 2020-08-25 DIAGNOSIS — M79.7 FIBROMYALGIA: Primary | ICD-10-CM

## 2020-08-25 PROCEDURE — 99203 OFFICE O/P NEW LOW 30 MIN: CPT | Mod: TEL | Performed by: INTERNAL MEDICINE

## 2020-08-25 PROCEDURE — 99214 OFFICE O/P EST MOD 30 MIN: CPT | Mod: 95 | Performed by: INTERNAL MEDICINE

## 2020-08-25 PROCEDURE — 71046 X-RAY EXAM CHEST 2 VIEWS: CPT

## 2020-08-25 PROCEDURE — 74220 X-RAY XM ESOPHAGUS 1CNTRST: CPT

## 2020-08-25 RX ORDER — PREGABALIN 25 MG/1
25 CAPSULE ORAL 3 TIMES DAILY
Qty: 90 CAPSULE | Refills: 1 | Status: SHIPPED | OUTPATIENT
Start: 2020-08-25 | End: 2020-09-30

## 2020-08-25 NOTE — PATIENT INSTRUCTIONS
Ramone Milian,  It was nice to talk with you today.  It seems that we got disconnected and I have been unable to reach you.  For the next steps of your treatment, I would like you to complete the barium test which you are doing today and the next steps will depend on those results.  If there is concern for any blockages in the esophagus that are causing your symptoms, we will plan to schedule for an upper endoscopy.  If it appears on the barium test that there are any issues with the motility or muscle function of the esophagus, then we will plan for testing called esophageal manometry and possibly pH testing of the esophagus.  Please continue your same medications in the interim and let me know if you have any questions.    Serafin Wiley MD

## 2020-08-25 NOTE — PATIENT INSTRUCTIONS
1.  You were seen in the ENT Clinic today by . If you have any questions or concerns after your appointment, please call [phone]. Press option #1 for scheduling related needs. Press option #3 for Nurse advice.    2.   has recommended the following:   - CT scan of neck   -Speech therapy    3.  Plan is to return to clinic for laryngoscopy/stroboscopy when able to schedule      Amanda Munson LPN  [facility] - Otolaryngology

## 2020-08-25 NOTE — PROGRESS NOTES
"Swetha Patterson is a 50 year old female who is being evaluated via a billable telephone visit.      The patient has been notified of following:     \"This telephone visit will be conducted via a call between you and your physician/provider. We have found that certain health care needs can be provided without the need for a physical exam.  This service lets us provide the care you need with a short phone conversation.  If a prescription is necessary we can send it directly to your pharmacy.  If lab work is needed we can place an order for that and you can then stop by our lab to have the test done at a later time.    Telephone visits are billed at different rates depending on your insurance coverage. During this emergency period, for some insurers they may be billed the same as an in-person visit.  Please reach out to your insurance provider with any questions.    If during the course of the call the physician/provider feels a telephone visit is not appropriate, you will not be charged for this service.\"    Patient has given verbal consent for Telephone visit?  Yes    What phone number would you like to be contacted at? [phone]    How would you like to obtain your AVS? [facility]    Phone call duration:  minutes    Rodriguez Belle MA        "

## 2020-08-25 NOTE — TELEPHONE ENCOUNTER
Writer called patient to complete office telephone visit because call was dropped.  Writer called patient to read instructions from Dr. Wiley instructions from the AVS below.  Patient heard and understood. Patient asked how the next steps would go and writer told patient that after the results of the barium swallow test done today, Dr. Wiley will make his recommendations to patient through our office and that we  call and schedule future procedures, lab work and office visits on  Dr. Bertrand  Orders.  Rodriguez Belle MA             Instructions   from MD Ramone Ann,  It was nice to talk with you today.  It seems that we got disconnected and I have been unable to reach you.  For the next steps of your treatment, I would like you to complete the barium test which you are doing today and the next steps will depend on those results.  If there is concern for any blockages in the esophagus that are causing your symptoms, we will plan to schedule for an upper endoscopy.  If it appears on the barium test that there are any issues with the motility or muscle function of the esophagus, then we will plan for testing called esophageal manometry and possibly pH testing of the esophagus.  Please continue your same medications in the interim and let me know if you have any questions.     Serafin Wiley MD

## 2020-08-27 ENCOUNTER — TELEPHONE (OUTPATIENT)
Dept: GASTROENTEROLOGY | Facility: CLINIC | Age: 50
End: 2020-08-27

## 2020-08-27 DIAGNOSIS — R11.0 NAUSEA: ICD-10-CM

## 2020-08-27 NOTE — TELEPHONE ENCOUNTER
LPN spoke with patient and notified her of results and recommendations from provider below. Patient verbalized understanding and was provided the telephone number to call for scheduling. Writer sent message to endo scheduling pool also.    Serafin Wiley MD  [facility]Ad[facility]               Can we please let this patient know that I reviewed her esophagram results.  They are normal.  I recommend proceeding with esophageal manometry as the next step of evaluation.  This will be scheduled down at [facility].  I have placed a referral for this.  Can you please let her know?   Thanks, MD Prachi Ann LPN

## 2020-08-27 NOTE — PROGRESS NOTES
GASTROENTEROLOGY Telephone new patient visit    CC/REFERRING MD:    Roro Chawla    HISTORY OF PRESENT ILLNESS:    Swetha Patterson is a 50 year old female who is being evaluated via a billable telephone visit.      We evaluated this patient for difficulty swallowing.  She notes that she has had 1 year of difficulty swallowing.  She reports that she gets dysphagia to solid foods.  She is only able to swallow Jell-O or soup.  She notes that she also has throat pain and a chronic cough.  She has seen speech pathology for this issue and has been recommended speech therapy.  She reports that because of the symptoms, she is scared to eat and so she has lost approximately 17 pounds in the last few months.  She notes that she gets frequent regurgitation.  She does not have any pain upon swallowing.  She does note that she has had some loose stools in the past few weeks.  She does take omeprazole twice daily.  She does not use NSAIDs.  She has had multiple EGD in the past due to symptoms of nausea and vomiting, last in 2018.  She has had colonoscopy, last in 2018 with biopsies negative for microscopic colitis in the setting of diarrhea.    I have reviewed and updated the patient's Past Medical History, Social History, Family History and Medication List.    ALLERGIES  Codeine; Cyclobenzaprine; Hydrocodone; Sulfa drugs; and Gabapentin    PERTINENT STUDIES have been reviewed.    ASSESSMENT/PLAN:    Swetha Patterson is a 50 year old female who presents for evaluation of dysphagia.  She has dysphagia to solids and by description there is a esophageal component but also possibly an oropharyngeal component to it.  She is on PPI twice daily.  She does have a known 3 cm hiatal hernia which could be impacting things.  She had an esophagram done on the day of this visit and that was unremarkable.  I think that proceeding with esophageal manometry is the next best step for evaluation.      Phone call duration:  11 minutes    RTC 3 months    Thank you for this consultation.  It was a pleasure to participate in the care of this patient; please contact us with any further questions.      This note was created with voice recognition software, and while reviewed for accuracy, typos may remain.     Serafin Wiley MD  Division of Gastroenterology, Hepatology and Nutrition  [facility]  [phone]

## 2020-08-27 NOTE — TELEPHONE ENCOUNTER
Pending Prescriptions:                       Disp   Refills    ondansetron (ZOFRAN) 8 MG tablet [Pharmacy*30 tab*1        Sig: TAKE ONE-HALF TO ONE TABLETS BY MOUTH EVERY 8 HOURS           AS NEEDED FOR NAUSEA

## 2020-08-28 ENCOUNTER — TELEPHONE (OUTPATIENT)
Dept: INTERNAL MEDICINE | Facility: CLINIC | Age: 50
End: 2020-08-28

## 2020-08-28 DIAGNOSIS — M79.7 FIBROMYALGIA: Primary | ICD-10-CM

## 2020-08-28 RX ORDER — ONDANSETRON 8 MG/1
TABLET, FILM COATED ORAL
Qty: 30 TABLET | Refills: 1 | Status: SHIPPED | OUTPATIENT
Start: 2020-08-28 | End: 2020-11-07

## 2020-08-28 NOTE — TELEPHONE ENCOUNTER
Spoke with patient.  States approx 10-15 min after taking medication, feels like she is drunk.    Asking if there is a different medication she can take?    Last office visit 8-25-20    Please advise, thanks.

## 2020-08-28 NOTE — TELEPHONE ENCOUNTER
Patient calling  The pregabalin (LYRICA) 25 MG capsule makes her feel drunk. Please advise if this is just for the short term. Ok to call and  [phone]

## 2020-08-31 ENCOUNTER — VIRTUAL VISIT (OUTPATIENT)
Dept: SLEEP MEDICINE | Facility: CLINIC | Age: 50
End: 2020-08-31
Payer: COMMERCIAL

## 2020-08-31 ENCOUNTER — PRE VISIT (OUTPATIENT)
Dept: GASTROENTEROLOGY | Facility: CLINIC | Age: 50
End: 2020-08-31

## 2020-08-31 VITALS — WEIGHT: 215 LBS | HEIGHT: 63 IN | BODY MASS INDEX: 38.09 KG/M2

## 2020-08-31 DIAGNOSIS — R29.818 SUSPECTED SLEEP APNEA: Primary | ICD-10-CM

## 2020-08-31 DIAGNOSIS — E66.01 MORBID OBESITY (H): ICD-10-CM

## 2020-08-31 DIAGNOSIS — R40.0 SLEEPINESS: ICD-10-CM

## 2020-08-31 DIAGNOSIS — J44.9 COPD, MODERATE (H): ICD-10-CM

## 2020-08-31 PROCEDURE — 99202 OFFICE O/P NEW SF 15 MIN: CPT | Mod: TEL | Performed by: INTERNAL MEDICINE

## 2020-08-31 ASSESSMENT — MIFFLIN-ST. JEOR: SCORE: 1559.36

## 2020-08-31 NOTE — PATIENT INSTRUCTIONS
"MY TREATMENT INFORMATION FOR SLEEP APNEA-  Swetha Patterson    DOCTOR : Cristhian Whiting MD, MD  SLEEP CENTER :      MY CONTACT NUMBER:     Am I having a sleep study at a sleep center?  Make sure you have an appointment for the study before you leave!    Am I having a home sleep study?  Watch this video:  /drop off device-   Https://www.[employer] Combined[employer].com/watch?v=yGGFBdELGhk  Disposable device sent out require phone/computer application-   https://www.[employer].com/watch?v=BCce_vbiwxE  Please verify your insurance coverage with your insurance carrier    Frequently asked questions:  1. What is Obstructive Sleep Apnea (AJ)? AJ is the most common type of sleep apnea. Apnea means, \"without breath.\"  Apnea is most often caused by narrowing or collapse of the upper airway as muscles relax during sleep.   Almost everyone has occasional apneas. Most people with sleep apnea have had brief interruptions at night frequently for many years.  The severity of sleep apnea is related to how frequent and severe the events are.   2. What are the consequences of AJ? Symptoms include: feeling sleepy during the day, snoring loudly, gasping or stopping of breathing, trouble sleeping, and occasionally morning headaches or heartburn at night.  Sleepiness can be serious and even increase the risk of falling asleep while driving. Other health consequences may include development of high blood pressure and other cardiovascular disease in persons who are susceptible. Untreated AJ  can contribute to heart disease, stroke and diabetes.   3. What are the treatment options? In most situations, sleep apnea is a lifelong disease that must be managed with daily therapy. Medications are not effective for sleep apnea and surgery is generally not considered until other therapies have been tried. Your treatment is your choice . Continuous Positive Airway (CPAP) works right away and is the therapy that is effective in nearly everyone. An oral device to hold " your jaw forward is usually the next most reliable option. Other options include postioning devices (to keep you off your back), weight loss, and surgery including a tongue pacing device. There is more detail about some of these options below.    Important tips for using CPAP and similar devices   Know your equipment:  CPAP is continuous positive airway pressure that prevents obstructive sleep apnea by keeping the throat from collapsing while you are sleeping. In most cases, the device is  smart  and can slowly self-adjusts if your throat collapses and keeps a record every day of how well you are treated-this information is available to you and your care team.  BPAP is bilevel positive airway pressure that keeps your throat open and also assists each breath with a pressure boost to maintain adequate breathing.  Special kinds of BPAP are used in patients who have inadequate breathing from lung or heart disease. In most cases, the device is  smart  and can slowly self-adjusts to assist breathing. Like CPAP, the device keeps a record of how well you are treated.  Your mask is your connection to the device. You get to choose what feels most comfortable and the staff will help to make sure if fits. Here: are some examples of the different masks that are available:       Key points to remember on your journey with sleep apnea:  1. Sleep study.  PAP devices often need to be adjusted during a sleep study to show that they are effective and adjusted right.  2. Good tips to remember: Try wearing just the mask during a quiet time during the day so your body adapts to wearing it. A humidifier is recommended for comfort in most cases to prevent drying of your nose and throat. Allergy medication from your provider may help you if you are having nasal congestion.  3. Getting settled-in. It takes more than one night for most of us to get used to wearing a mask. Try wearing just the mask during a quiet time during the day so your  body adapts to wearing it. A humidifier is recommended for comfort in most cases. Our team will work with you carefully on the first day and will be in contact within 4 days and again at 2 and 4 weeks for advice and remote device adjustments. Your therapy is evaluated by the device each day.   4. Use it every night. The more you are able to sleep naturally for 7-8 hours, the more likely you will have good sleep and to prevent health risks or symptoms from sleep apnea. Even if you use it 4 hours it helps. Occasionally all of us are unable to use a medical therapy, in sleep apnea, it is not dangerous to miss one night.   5. Communicate. Call our skilled team on the number provided on the first day if your visit for problems that make it difficult to wear the device. Over 2 out of 3 patients can learn to wear the device long-term with help from our team. Remember to call our team or your sleep providers if you are unable to wear the device as we may have other solutions for those who cannot adapt to mask CPAP therapy. It is recommended that you sleep your sleep provider within the first 3 months and yearly after that if you are not having problems.   6. Use it for your health. We encourage use of CPAP masks during daytime quiet periods to allow your face and brain to adapt to the sensation of CPAP so that it will be a more natural sensation to awaken to at night or during naps. This can be very useful during the first few weeks or months of adapting to CPAP though it does not help medically to wear CPAP during wakefulness and  should not be used as a strategy just to meet guidelines.  7. Take care of your equipment. Make sure you clean your mask and tubing using directions every day and that your filter and mask are replaced as recommended or if they are not working.     BESIDES CPAP, WHAT OTHER THERAPIES ARE THERE?    Positioning Device  Positioning devices are generally used when sleep apnea is mild and only occurs  on your back.This example shows a pillow that straps around the waist. It may be appropriate for those whose sleep study shows milder sleep apnea that occurs primarily when lying flat on one's back. Preliminary studies have shown benefit but effectiveness at home may need to be verified by a home sleep test. These devices are generally not covered by medical insurance.  Examples of devices that maintain sleeping on the back to prevent snoring and mild sleep apnea.    Belt type body positioner  Http://[employer] Device.Ateo/    Electronic reminder  Http://nightshifttherapy.com/  Http://www.Zenops.Ateo.au/      Oral Appliance  What is oral appliance therapy?  An oral appliance device fits on your teeth at night like a retainer used after having braces. The device is made by a specialized dentist and requires several visits over 1-2 months before a manufactured device is made to fit your teeth and is adjusted to prevent your sleep apnea. Once an oral device is working properly, snoring should be improved. A home sleep test may be recommended at that time if to determine whether the sleep apnea is adequately treated.       Some things to remember:  -Oral devices are often, but not always, covered by your medical insurance. Be sure to check with your insurance provider.   -If you are referred for oral therapy, you will be given a list of specialized dentists to consider or you may choose to visit the Web site of the American Academy of Dental Sleep Medicine  -Oral devices are less likely to work if you have severe sleep apnea or are extremely overweight.     More detailed information  An oral appliance is a small acrylic device that fits over the upper and lower teeth  (similar to a retainer or a mouth guard). This device slightly moves jaw forward, which moves the base of the tongue forward, opens the airway, improves breathing for effective treat snoring and obstructive sleep apnea in perhaps 7 out of 10 people .  The best  working devices are custom-made by a dental device  after a mold is made of the teeth 1, 2, 3.  When is an oral appliance indicated?  Oral appliance therapy is recommended as a first-line treatment for patients with primary snoring, mild sleep apnea, and for patients with moderate sleep apnea who prefer appliance therapy to use of CPAP4, 5. Severity of sleep apnea is determined by sleep testing and is based on the number of respiratory events per hour of sleep.   How successful is oral appliance therapy?  The success rate of oral appliance therapy in patients with mild sleep apnea is 75-80% while in patients with moderate sleep apnea it is 50-70%. The chance of success in patients with severe sleep apnea is 40-50%. The research also shows that oral appliances have a beneficial effect on the cardiovascular health of AJ patients at the same magnitude as CPAP therapy7.  Oral appliances should be a second-line treatment in cases of severe sleep apnea, but if not completely successful then a combination therapy utilizing CPAP plus oral appliance therapy may be effective. Oral appliances tend to be effective in a broad range of patients although studies show that the patients who have the highest success are females, younger patients, those with milder disease, and less severe obesity. 3, 6.   Finding a dentist that practices dental sleep medicine  Specific training is available through the American Academy of Dental Sleep Medicine for dentists interested in working in the field of sleep. To find a dentist who is educated in the field of sleep and the use of oral appliances, near you, visit the Web site of the American Academy of Dental Sleep Medicine.    References  1. Hina et al. Objectively measured vs self-reported compliance during oral appliance therapy for sleep-disordered breathing. Chest 2013; 144(5): 5928-2136.  2. Laury et al. Objective measurement of compliance during oral appliance  therapy for sleep-disordered breathing. Thorax 2013; 68(1): 91-96.  3. Kevin et al. Mandibular advancement devices in 620 men and women with AJ and snoring: tolerability and predictors of treatment success. Chest 2004; 125: 3289-7744.  4. Buster et al. Oral appliances for snoring and AJ: a review. Sleep 2006; 29: 244-262.  5. Shaylee et al. Oral appliance treatment for AJ: an update. J Clin Sleep Med 2014; 10(2): 215-227.  6. Hoa et al. Predictors of OSAH treatment outcome. J Dent Res 2007; 86: 0800-9619.      Weight Loss:    Weight loss is a long-term strategy that may improve sleep apnea in some patients.    Weight management is a personal decision and the decision should be based on your interest and the potential benefits.  If you are interested in exploring weight loss strategies, the following discussion covers the impact on weight loss on sleep apnea and the approaches that may be successful.    Being overweight does not necessarily mean you will have health consequences.  Those who have BMI over 35 or over 27 with existing medical conditions carries greater risk.   Weight loss decreases severity of sleep apnea in most people with obesity. For those with mild obesity who have developed snoring with weight gain, even 15-30 pound weight loss can improve and occasionally eliminate sleep apnea.  Structured and life-long dietary and health habits are necessary to lose weight and keep healthier weight levels.     Though there may be significant health benefits from weight loss, long-term weight loss is very difficult to achieve- studies show success with dietary management in less than 10% of people. In addition, substantial weight loss may require years of dietary control and may be difficult if patients have severe obesity. In these cases, surgical management may be considered.  Finally, older individuals who have tolerated obesity without health complications may be less likely to benefit from  weight loss strategies.        Your BMI is Body mass index is 38.09 kg/m .  Weight management is a personal decision.  If you are interested in exploring weight loss strategies, the following discussion covers the approaches that may be successful. Body mass index (BMI) is one way to tell whether you are at a healthy weight, overweight, or obese. It measures your weight in relation to your height.  A BMI of 18.5 to 24.9 is in the healthy range. A person with a BMI of 25 to 29.9 is considered overweight, and someone with a BMI of 30 or greater is considered obese. More than two-thirds of American adults are considered overweight or obese.  Being overweight or obese increases the risk for further weight gain. Excess weight may lead to heart disease and diabetes.  Creating and following plans for healthy eating and physical activity may help you improve your health.  Weight control is part of healthy lifestyle and includes exercise, emotional health, and healthy eating habits. Careful eating habits lifelong are the mainstay of weight control. Though there are significant health benefits from weight loss, long-term weight loss with diet alone may be very difficult to achieve- studies show long-term success with dietary management in less than 10% of people. Attaining a healthy weight may be especially difficult to achieve in those with severe obesity. In some cases, medications, devices and surgical management might be considered.  What can you do?  If you are overweight or obese and are interested in methods for weight loss, you should discuss this with your provider.     Consider reducing daily calorie intake by 500 calories.     Keep a food journal.     Avoiding skipping meals, consider cutting portions instead.    Diet combined with exercise helps maintain muscle while optimizing fat loss. Strength training is particularly important for building and maintaining muscle mass. Exercise helps reduce stress, increase  energy, and improves fitness. Increasing exercise without diet control, however, may not burn enough calories to loose weight.       Start walking three days a week 10-20 minutes at a time    Work towards walking thirty minutes five days a week     Eventually, increase the speed of your walking for 1-2 minutes at time    In addition, we recommend that you review healthy lifestyles and methods for weight loss available through the National Institutes of Health patient information sites:  http://win.niddk.nih.gov/publications/index.htm    And look into health and wellness programs that may be available through your health insurance provider, employer, local community center, or lucie club.    Weight management plan: Patient was referred to their PCP to discuss a diet and exercise plan.    Surgery:    Surgery for obstructive sleep apnea is considered generally only when other therapies fail to work. Surgery may be discussed with you if you are having a difficult time tolerating CPAP and or when there is an abnormal structure that requires surgical correction.  Nose and throat surgeries often enlarge the airway to prevent collapse.  Most of these surgeries create pain for 1-2 weeks and up to half of the most common surgeries are not effective throughout life.  You should carefully discuss the benefits and drawbacks to surgery with your sleep provider and surgeon to determine if it is the best solution for you.   More information  Surgery for AJ is directed at areas that are responsible for narrowing or complete obstruction of the airway during sleep.  There are a wide range of procedures available to enlarge and/or stabilize the airway to prevent blockage of breathing in the three major areas where it can occur: the palate, tongue, and nasal regions.  Successful surgical treatment depends on the accurate identification of the factors responsible for obstructive sleep apnea in each person.  A personalized approach is  required because there is no single treatment that works well for everyone.  Because of anatomic variation, consultation with an examination by a sleep surgeon is a critical first step in determining what surgical options are best for each patient.  In some cases, examination during sedation may be recommended in order to guide the selection of procedures.  Patients will be counseled about risks and benefits as well as the typical recovery course after surgery. Surgery is typically not a cure for a person s AJ.  However, surgery will often significantly improve one s AJ severity (termed  success rate ).  Even in the absence of a cure, surgery will decrease the cardiovascular risk associated with OSA7; improve overall quality of life8 (sleepiness, functionality, sleep quality, etc).      Palate Procedures:  Patients with AJ often have narrowing of their airway in the region of their tonsils and uvula.  The goals of palate procedures are to widen the airway in this region as well as to help the tissues resist collapse.  Modern palate procedure techniques focus on tissue conservation and soft tissue rearrangement, rather than tissue removal.  Often the uvula is preserved in this procedure. Residual sleep apnea is common in patient after pharyngoplasty with an average reduction in sleep apnea events of 33%2.      Tongue Procedures:  ExamWhile patients are awake, the muscles that surround the throat are active and keep this region open for breathing. These muscles relax during sleep, allowing the tongue and other structures to collapse and block breathing.  There are several different tongue procedures available.  Selection of a tongue base procedure depends on characteristics seen on physical exam.  Generally, procedures are aimed at removing bulky tissues in this area or preventing the back of the tongue from falling back during sleep.  Success rates for tongue surgery range from 50-62%3.    Hypoglossal Nerve  Stimulation:  Hypoglossal nerve stimulation has recently received approval from the [place] Food and Drug Administration for the treatment of obstructive sleep apnea.  This is based on research showing that the system was safe and effective in treating sleep apnea6.  Results showed that the median AHI score decreased 68%, from 29.3 to 9.0. This therapy uses an implant system that senses breathing patterns and delivers mild stimulation to airway muscles, which keeps the airway open during sleep.  The system consists of three fully implanted components: a small generator (similar in size to a pacemaker), a breathing sensor, and a stimulation lead.  Using a small handheld remote, a patient turns the therapy on before bed and off upon awakening.    Candidates for this device must be greater than 22 years of age, have moderate to severe AJ (AHI between 20-65), BMI less than 32, have tried CPAP/oral appliance without success, and have appropriate upper airway anatomy (determined by a sleep endoscopy performed by Dr. Orellana).    Hypoglossal Nerve Stimulation Pathway:    The sleep surgeon s office will work with the patient through the insurance prior-authorization process (including communications and appeals).    Nasal Procedures:  Nasal obstruction can interfere with nasal breathing during the day and night.  Studies have shown that relief of nasal obstruction can improve the ability of some patients to tolerate positive airway pressure therapy for obstructive sleep apnea1.  Treatment options include medications such as nasal saline, topical corticosteroid and antihistamine sprays, and oral medications such as antihistamines or decongestants. Non-surgical treatments can include external nasal dilators for selected patients. If these are not successful by themselves, surgery can improve the nasal airway either alone or in combination with these other options.      Combination Procedures:  Combination of surgical  procedures and other treatments may be recommended, particularly if patients have more than one area of narrowing or persistent positional disease.  The success rate of combination surgery ranges from 66-80%2,3.    References  1. Patricio COTO. The Role of the Nose in Snoring and Obstructive Sleep Apnoea: An Update.  Eur Arch Otorhinolaryngol. 2011; 268: 1365-73.  2.  Fidelia SM; Anthony JA; Alice JR; Pallanch JF; Dionne MB; Kari SG; Steph AVENDAÑO. Surgical modifications of the upper airway for obstructive sleep apnea in adults: a systematic review and meta-analysis. SLEEP 2010;33(10):5418-0328. Pramod JENNINGS. Hypopharyngeal surgery in obstructive sleep apnea: an evidence-based medicine review.  Arch Otolaryngol Head Neck Surg. 2006 Feb;132(2):206-13.  3. Hemal YH1, Everardo Y, Kirt ROSALIA. The efficacy of anatomically based multilevel surgery for obstructive sleep apnea. Otolaryngol Head Neck Surg. 2003 Oct;129(4):327-35.  4. Pramod JENNINGS, Goldberg A. Hypopharyngeal Surgery in Obstructive Sleep Apnea: An Evidence-Based Medicine Review. Arch Otolaryngol Head Neck Surg. 2006 Feb;132(2):206-13.  5. Bettie CORRAL et al. Upper-Airway Stimulation for Obstructive Sleep Apnea.  N Engl J Med. 2014 Jan 9;370(2):139-49.  6. Anton Y et al. Increased Incidence of Cardiovascular Disease in Middle-aged Men with Obstructive Sleep Apnea. Am J Respir Crit Care Med; 2002 166: 159-165  7. Abel EM et al. Studying Life Effects and Effectiveness of Palatopharyngoplasty (SLEEP) study: Subjective Outcomes of Isolated Uvulopalatopharyngoplasty. Otolaryngol Head Neck Surg. 2011; 144: 623-631.

## 2020-08-31 NOTE — PROGRESS NOTES
"Swetha Patterson is a 50 year old female who is being evaluated via a billable telephone visit.      The patient has been notified of following:     \"This telephone visit will be conducted via a call between you and your physician/provider. We have found that certain health care needs can be provided without the need for a physical exam.  This service lets us provide the care you need with a short phone conversation.  If a prescription is necessary we can send it directly to your pharmacy.  If lab work is needed we can place an order for that and you can then stop by our lab to have the test done at a later time.    Telephone visits are billed at different rates depending on your insurance coverage. During this emergency period, for some insurers they may be billed the same as an in-person visit.  Please reach out to your insurance provider with any questions.    If during the course of the call the physician/provider feels a telephone visit is not appropriate, you will not be charged for this service.\"    Patient has given verbal consent for Telephone visit?  Yes    What phone number would you like to be contacted at? [phone]    How would you like to obtain your AVS? MyChart    Phone call duration: 18 minutes    Cristhian Whiting MD    Sleep Consultation:    Date on this visit: 8/31/2020    Swetha Patterson is sent by No ref. provider found for a sleep consultation regarding possible sleep apnea.    Primary Physician: Roro Chawla     Presenting History:     Swetha Patterson has been sent by Pulmonology for an evaluation of sleep apnea.     Her medical history is significant for COPD, asthma, mood disorder, HTN, GERD, fibromyalgia.     Swetha doesnot know if she snores as she doesnot have a bedpartner. She has experienced snort arousals in the past but not currently.  Patient does not have a regular bed partner. There is report of gasping.  She does not have witnessed apneas. She has frequent " morning dry mouth, denies having headaches. Swetha denies any bruxism, sleep walking, sleep talking, dream enactment, sleep paralysis, cataplexy and hypnogogic/hypnopompic hallucinations.    She has restless leg symptoms at bedtime with mild top moderate symptom burden.     Swetha goes to sleep at 9:30 -10:00 PM during the week. She wakes up at 8:00 AM without an alarm. She falls asleep in 30 minutes.  Swetha denies difficulty falling asleep.  She wakes up 1-2 times a night for 5 minutes before falling back to sleep.  Swetha wakes up to go to the bathroom and uncertain reasons.  On weekends, Swetha goes to sleep at 10:00 PM.  She wakes up at 8:00 AM without an alarm. She falls asleep in 30 minutes.  Patient gets an average of 9-10 hours of sleep per night.     Patient does use electronics in bed and watch TV in bed.     She is currently on disability.      Swetha denies difficulty breathing through her nose.      Patient's [place] Sleepiness score 9/24 consistent with no daytime sleepiness.      Swetha naps 1 times per day for 30-60 minutes, feels refreshed after naps. She takes some inadvertant naps.  She denies closing eyes, dozing and falling asleep while driving.  Patient was counseled on the importance of driving while alert, to pull over if drowsy, or nap before getting into the vehicle if sleepy.      She uses 1 sodas/day. Last caffeine intake is usually before noon.    Allergies:    Allergies   Allergen Reactions     Codeine Nausea and Vomiting     vomiting     Cyclobenzaprine Nausea     Hydrocodone Nausea and Vomiting     Sulfa Drugs Nausea and Vomiting     Nausea     Gabapentin Rash       Medications:    Current Outpatient Medications   Medication Sig Dispense Refill     albuterol (PROAIR HFA/PROVENTIL HFA/VENTOLIN HFA) 108 (90 Base) MCG/ACT inhaler Inhale 2 puffs into the lungs every 6 hours as needed for shortness of breath / dyspnea 18 g 3     albuterol (PROVENTIL) (2.5 MG/3ML)  0.083% neb solution Take 1 vial (2.5 mg) by nebulization every 6 hours as needed for shortness of breath / dyspnea or wheezing 25 vial 3     amLODIPine (NORVASC) 5 MG tablet Take 1 tablet (5 mg) by mouth daily 90 tablet 0     atorvastatin (LIPITOR) 80 MG tablet Take 1 tablet (80 mg) by mouth daily 90 tablet 0     benzoyl peroxide 10 % EX external gel Apply topically 2 times daily 90 g 3     blood glucose (ACCU-CHEK ALLYSON PLUS) test strip USE TO TEST BLOOD SUGAR ONE TIME DAILY OR AS DIRECTED 100 each 0     blood glucose (NO BRAND SPECIFIED) lancets standard Use to test blood sugar 1 time daily 100 each 1     blood glucose monitoring (NO BRAND SPECIFIED) meter device kit Use to test blood sugar 1 times daily or as directed. 1 kit 0     budesonide-formoterol (SYMBICORT) 160-4.5 MCG/ACT IN Inhaler Inhale 2 puffs into the lungs 2 times daily 3 Inhaler 1     buPROPion 150 MG PO 24 hr tablet Take 1 tablet (150 mg) by mouth every morning 90 tablet 1     cetirizine HCl 10 MG CAPS Take 1 capsule (10 mg) by mouth daily as needed Takes in the spring. 90 capsule 3     clindamycin 1 % EX external gel Apply topically 2 times daily 60 g 3     desonide (DESOWEN) 0.05 % external cream Apply topically as needed (rash) 60 g 0     doxycycline hyclate (VIBRA-TABS) 100 MG tablet Take 1 tablet (100 mg) by mouth 2 times daily 180 tablet 0     fluticasone 50 MCG/ACT NA nasal spray Spray 2 sprays in nostril daily 16 g 5     furosemide (LASIX) 20 MG tablet Take 1 tablet (20 mg) by mouth daily 90 tablet 0     glipiZIDE (GLUCOTROL XL) 2.5 MG 24 hr tablet Take 1 tablet (2.5 mg) by mouth daily 30 tablet 1     hydrOXYzine (ATARAX) 50 MG tablet TAKE TWO TABLETS BY MOUTH THREE TIMES A  tablet 0     ibuprofen (ADVIL/MOTRIN) 600 MG tablet Take 1 tablet (600 mg) by mouth every 6 hours as needed for pain 30 tablet 0     lamoTRIgine (LAMICTAL) 100 MG tablet        lisinopril (ZESTRIL) 20 MG tablet Take 1 tablet (20 mg) by mouth daily 90 tablet 0      metFORMIN (GLUCOPHAGE) 500 MG tablet Take 1 tablet (500 mg) by mouth daily (with breakfast) 90 tablet 0     metroNIDAZOLE (METROGEL) 0.75 % external gel Apply topically 2 times daily 45 g 1     montelukast (SINGULAIR) 10 MG tablet TAKE ONE TABLET BY MOUTH AT BEDTIME 90 tablet 0     nitroGLYcerin (NITROSTAT) 0.4 MG sublingual tablet FOR CHEST PAIN PLACE ONE TABLET UNDER THE TONGUE EVERY 5 MINUTES FOR 3 DOSES.  IF SYPTOMS PERSIST 5 MINUTES AFTER 1ST DOSE CALL 911 25 tablet 0     nystatin [id number] UNIT/GM EX external powder Apply topically 2 times daily as needed (skin rash) 60 g 11     omeprazole (PRILOSEC) 20 MG DR capsule Take 1 capsule (20 mg) by mouth 2 times daily 180 capsule 1     omeprazole 20 MG tablet Take 1 tablet (20 mg) by mouth 2 times daily 180 tablet 1     ondansetron (ZOFRAN) 8 MG tablet TAKE ONE-HALF TO ONE TABLETS BY MOUTH EVERY 8 HOURS AS NEEDED FOR NAUSEA 30 tablet 1     order for DME Please provide oximeter 1 Device 0     order for DME Equipment being ordered: Digital home blood pressure monitor kit 1 Device 0     order for DME Equipment being ordered: Pulse Oxymeter 1 Device 0     predniSONE (DELTASONE) 20 MG tablet Take 1 tablet (20 mg) by mouth daily 7 tablet 0     pregabalin (LYRICA) 25 MG capsule Take 1 capsule (25 mg) by mouth 3 times daily 90 capsule 1     sulfamethoxazole-trimethoprim (BACTRIM DS) 800-160 MG tablet Take 1 tablet by mouth 2 times daily Take one tablet twice daily. 60 tablet 3     tiotropium (SPIRIVA HANDIHALER) 18 MCG IN inhaled capsule Inhale contents of one capsule daily. 90 capsule 3     acetaminophen (TYLENOL) 325 MG tablet Take 3 tablets (975 mg) by mouth every 6 hours as needed for pain (Patient not taking: Reported on 8/25/2020) 50 tablet 0     nicotine (EQ NICOTINE POLACRILEX) 2 MG lozenge Place 1 lozenge (2 mg) inside cheek every hour as needed for smoking cessation (Patient not taking: Reported on 8/25/2020) 360 lozenge 1     nicotine (NICORETTE) 2 MG gum Place  1 each (2 mg) inside cheek as needed for smoking cessation (Patient not taking: Reported on 8/25/2020) 150 each 1     nicotine 14 MG/24HR TD 24 hr patch Place 1 patch onto the skin every 24 hours (Patient not taking: Reported on 8/25/2020) 30 patch 1     nortriptyline (PAMELOR) 10 MG capsule Take 3 capsules (30 mg) by mouth At Bedtime (Patient not taking: Reported on 8/25/2020) 30 capsule 4     senna-docusate (SENOKOT-S/PERICOLACE) 8.6-50 MG tablet Take 1-2 tablets by mouth 2 times daily Use while taking narcotic pain medication to prevent constipation. (Patient not taking: Reported on 8/25/2020) 10 tablet 0     varenicline (CHANTIX) 1 MG tablet Take 1 tablet (1 mg) by mouth 2 times daily (Patient not taking: Reported on 8/25/2020) 60 tablet 3       Problem List:  Patient Active Problem List    Diagnosis Date Noted     Claudication of both lower extremities (H) 03/06/2020     Priority: Medium     PAD (peripheral artery disease) (H) 03/06/2020     Priority: Medium     Jaw claudication 03/06/2020     Priority: Medium     Added automatically from request for surgery [id number]       History of opioid abuse (H) Rx was stopped when she failed urine drug test 12/13/2019     Priority: Medium     HTN, goal below 140/90 01/12/2019     Priority: Medium     Vocal cord polyp 06/28/2018     Priority: Medium     Status post cervical spinal fusion 05/01/2018     Priority: Medium     Suicide attempt (H) 02/08/2018     Priority: Medium     Intentional drug overdose (H) 02/08/2018     Priority: Medium     Cervical HNP C5-6 11/09/2017     Priority: Medium     Opioid type dependence, continuous (H) 11/09/2017     Priority: Medium     DIAZ (nonalcoholic steatohepatitis) 06/17/2016     Priority: Medium     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  Doesn't drink, but relapsed (doesn't remember).  Was on Valium for 4 years (until October 2017). Xanax since 11/2017.  Managed by hospital.          Past Medical/Surgical History:  Past Medical History:   Diagnosis Date     Anxiety state, unspecified      Asthma      Chronic pain     back pain from cyst     Contact dermatitis and other eczema, due to unspecified cause      COPD (chronic obstructive pulmonary disease) (H)      Depressive disorder, not elsewhere classified      Emphysema with chronic bronchitis (H)      Esophageal reflux      Family history of ischemic heart disease      Gastro-oesophageal reflux disease      History of emphysema      Hoarseness      HTN, goal below 140/90      Hyperlipidemia LDL goal <130      Liver disease     \"fatty liver\"     Other chronic pain     chest, from coughing     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)      PONV (postoperative nausea and vomiting)      Past Surgical History:   Procedure Laterality Date     ARTHROSCOPY SHOULDER DECOMPRESSION Left 10/21/2015    Procedure: ARTHROSCOPY SHOULDER DECOMPRESSION;  Surgeon: Julien Milian MD;  Location: [facility] OR     BIOPSY ARTERY TEMPORAL Left 3/11/2020    Procedure: LEFT TEMPORAL ARTERY BIOPSY;  Surgeon: Ibeth Conner MD;  Location: [facility] OR     BRONCHIAL THERMOPLASTY N/A 11/14/2014    Procedure: BRONCHIAL THERMOPLASTY;  Surgeon: Ward Whitaker MD;  Location: [facility]     BRONCHIAL THERMOPLASTY N/A 12/19/2014    Procedure: BRONCHIAL THERMOPLASTY;  Surgeon: Ward Whitaker MD;  [facility]: [facility]     BRONCHIAL THERMOPLASTY N/A 2/6/2015    Procedure: BRONCHIAL THERMOPLASTY;  Surgeon: Sherman, " Ward Mabry MD;  Location: [facility] OR     C APPENDECTOMY  at age 17     COLONOSCOPY N/A 11/26/2018    Procedure: COLONOSCOPY ([facility]);  Surgeon: Marshall Oakes MD;  Location: [facility] OR     DISCECTOMY, FUSION CERVICAL ANTERIOR ONE LEVEL, COMBINED N/A 5/1/2018    Procedure: COMBINED DISCECTOMY, FUSION CERVICAL ANTERIOR ONE LEVEL;  1.  C5-C6 anterior cervical diskectomy and fusion.    2.  C5-C6 application of intervertebral biomechanical device for interbody fusion purposes.    3.  C5-C6 anterior instrumentation using the standard Kristin InViZia 24 mm plate with four associated bone screws, 12 mm in length.  Inferior screws are fixed.  Superior screws are va     ENT SURGERY  2015    polyps removed from vocal cords      EXCISE NODE MEDIASTINAL  4/26/2013    Procedure: EXCISE NODE MEDIASTINAL;;  Surgeon: Av Peña MD;  Location:  OR     LAPAROSCOPIC CHOLECYSTECTOMY N/A 10/19/2017    Procedure: LAPAROSCOPIC CHOLECYSTECTOMY;  LAPAROSCOPIC CHOLECYSTECTOMY;  Surgeon: Ney Jerry MD;  Location:  OR     THORACOSCOPY  4/26/2013    Procedure: THORACOSCOPY;  LEFT VIDEO ASSISTED THORACOSCOPY, RESECTION OF POSTERIOR MEDIASTINAL MASS;  Surgeon: Av Peña MD;  Location:  OR     TONSILLECTOMY  as a kid     TONSILLECTOMY         Social History:  Social History     Socioeconomic History     Marital status:      Spouse name: Not on file     Number of children: Not on file     Years of education: Not on file     Highest education level: Not on file   Occupational History     Not on file   Social Needs     Financial resource strain: Not on file     Food insecurity     Worry: Not on file     Inability: Not on file     Transportation needs     Medical: Not on file     Non-medical: Not on file   Tobacco Use     Smoking status: Current Every Day Smoker     Years: 30.00     Types: Cigarettes     Smokeless tobacco: Never Used     Tobacco comment: 2 cigs a day   Substance and Sexual Activity      Alcohol use: No     Alcohol/week: 0.0 standard drinks     Drug use: No     Sexual activity: Yes     Partners: Male     Birth control/protection: None   Lifestyle     Physical activity     Days per week: Not on file     Minutes per session: Not on file     Stress: Not on file   Relationships     Social connections     Talks on phone: Not on file     Gets together: Not on file     Attends Judaism service: Not on file     Active member of club or organization: Not on file     Attends meetings of clubs or organizations: Not on file     Relationship status: Not on file     Intimate partner violence     Fear of current or ex partner: Not on file     Emotionally abused: Not on file     Physically abused: Not on file     Forced sexual activity: Not on file   Other Topics Concern      Service Not Asked     Blood Transfusions Not Asked     Caffeine Concern No     Comment: 4-5 cans of pop daily     Occupational Exposure Not Asked     Hobby Hazards Not Asked     Sleep Concern Not Asked     Stress Concern Not Asked     Weight Concern Not Asked     Special Diet No     Back Care Not Asked     Exercise No     Comment: on hold     Bike Helmet Not Asked     Seat Belt Not Asked     Self-Exams Not Asked     Parent/sibling w/ CABG, MI or angioplasty before 65F 55M? Not Asked   Social History Narrative     Not on file       Family History:  Family History   Problem Relation Age of Onset     Heart Disease Mother         murmur, mi     Hypertension Mother      Cerebrovascular Disease Mother      Depression Mother      Gallbladder Disease Mother      Asthma Mother      Family History Negative Father         does not know  him     Family History Negative Brother      Heart Disease Maternal Grandfather      Asthma Maternal Grandfather      Hypertension Maternal Grandfather      Cerebrovascular Disease Maternal Grandfather      Diabetes Maternal Grandfather      Asthma Sister      Hypertension Sister      Cerebrovascular Disease  "Sister      Hypertension Maternal Grandmother      Cerebrovascular Disease Maternal Grandmother      - Sister snores loudly.     Review of Systems:  A complete review of systems reviewed by me is negative with the exeption of what has been mentioned in the history of present illness.  CONSTITUTIONAL: NEGATIVE for weight gain/loss, fever, chills, sweats or night sweats, drug allergies.  EYES: NEGATIVE for changes in vision, blind spots, double vision.  ENT: NEGATIVE for ear pain, sore throat, sinus pain, post-nasal drip, runny nose, bloody nose  CARDIAC: NEGATIVE for fast heartbeats or fluttering in chest, chest pain or pressure, breathlessness when lying flat, swollen legs or swollen feet.  NEUROLOGIC: NEGATIVE headaches, weakness or numbness in the arms or legs.  DERMATOLOGIC: NEGATIVE for rashes, new moles or change in mole(s)  PULMONARY:  POSITIVE for  SOB with activity, dry cough   GASTROINTESTINAL: NEGATIVE for nausea or vomitting, loose or watery stools, fat or grease in stools, constipation, abdominal pain, bowel movements black in color or blood noted.  GENITOURINARY: NEGATIVE for pain during urination, blood in urine, urinating more frequently than usual, irregular menstrual periods.  MUSCULOSKELETAL:  POSITIVE for  muscle pain  ENDOCRINE: NEGATIVE for increased thirst or urination, diabetes.  LYMPHATIC: NEGATIVE for swollen lymph nodes, lumps or bumps in the breasts or nipple discharge.    Screening:  Vitals: Ht 1.6 m (5' 3\")   Wt 97.5 kg (215 lb)   LMP 04/15/2016 (Exact Date)   BMI 38.09 kg/m    BMI= Body mass index is 38.09 kg/m .         [place] Total Score 8/31/2020   Total score - [place] 9       KANCHAN Total Score: 4 (08/31/20 1200)      Impression/Plan:    1. To rule out obstructive sleep apnea     - Patient is a 50 years old female, BMI 38, who has been sent for an evaluation of sleep disordered breathing. She doesnot have a bed partner to assess snoring or witnessed apneas. She has prolonged sleep " duration and is excessively sleepy during the day. Sedating effect of medications play a role but she si also at risk fro sleep apnea. An overnight sleep study is recommended for further assessment.     Plan:     1. PSG for assessment of sleep apnea     Literature provided regarding sleep apnea.      She will follow up with me in approximately two weeks after her sleep study has been competed to review the results and discuss plan of care.       Polysomnography reviewed.  Obstructive sleep apnea reviewed.  Complications of untreated sleep apnea were reviewed.    I spent a total of 18 minutes with patient with more than 50% in counseling     Dr. Cristhian Whiting     CC: No ref. provider found

## 2020-09-01 ENCOUNTER — HOSPITAL ENCOUNTER (OUTPATIENT)
Facility: CLINIC | Age: 50
End: 2020-09-01
Attending: INTERNAL MEDICINE | Admitting: INTERNAL MEDICINE
Payer: COMMERCIAL

## 2020-09-01 ENCOUNTER — TELEPHONE (OUTPATIENT)
Dept: GASTROENTEROLOGY | Facility: CLINIC | Age: 50
End: 2020-09-01

## 2020-09-01 DIAGNOSIS — Z11.59 ENCOUNTER FOR SCREENING FOR OTHER VIRAL DISEASES: Primary | ICD-10-CM

## 2020-09-01 NOTE — TELEPHONE ENCOUNTER
She had reaction to Gabapentin    She needs to call insurance and find out which pain clinic is covered under her insurance and I will make a referral.

## 2020-09-01 NOTE — TELEPHONE ENCOUNTER
Patient is scheduled for manometry with Dr. Flores    Spoke with: patient    Date of Procedure: 9-29-20    Location: [address]    Sedation Type none    Informed patient they will need an adult  n/a    Informed Patient of COVID Test Requirement yes    Preferred Pharmacy for Pre Prescription chart    Confirmed Nurse will call to complete assessment yes    Additional comments: none

## 2020-09-01 NOTE — TELEPHONE ENCOUNTER
Spoke with patient, asked that she call her insurance to see what they will cover since she has not tolerated Gabapentin or Lyrica.  SERJIO Chandra R.N.

## 2020-09-02 NOTE — TELEPHONE ENCOUNTER
Patient calls to see if another pain medication can be prescribed for her for fibromyalgia. She is in a lot of pain

## 2020-09-03 ENCOUNTER — TRANSFERRED RECORDS (OUTPATIENT)
Dept: HEALTH INFORMATION MANAGEMENT | Facility: CLINIC | Age: 50
End: 2020-09-03

## 2020-09-03 ENCOUNTER — TELEPHONE (OUTPATIENT)
Dept: INTERNAL MEDICINE | Facility: CLINIC | Age: 50
End: 2020-09-03

## 2020-09-03 NOTE — TELEPHONE ENCOUNTER
Left message on home voicemail asking patient to return call to clinic.  Patient has video visit scheduled with Maranda tomorrow.    SERJIO Chandra R.N.

## 2020-09-04 ENCOUNTER — TRANSFERRED RECORDS (OUTPATIENT)
Dept: HEALTH INFORMATION MANAGEMENT | Facility: CLINIC | Age: 50
End: 2020-09-04

## 2020-09-04 DIAGNOSIS — Z53.9 DIAGNOSIS NOT YET DEFINED: Primary | ICD-10-CM

## 2020-09-04 PROCEDURE — G0179 MD RECERTIFICATION HHA PT: HCPCS | Performed by: INTERNAL MEDICINE

## 2020-09-08 ENCOUNTER — TELEPHONE (OUTPATIENT)
Dept: INTERNAL MEDICINE | Facility: CLINIC | Age: 50
End: 2020-09-08

## 2020-09-08 NOTE — TELEPHONE ENCOUNTER
Patient has had a cough and chest tightness for 2 days and woke up feeling worse today. She is requesting a z jose alberto and prednisone be sent to [facility] in [place]. Call her to advise at [phone] (home)

## 2020-09-09 ENCOUNTER — VIRTUAL VISIT (OUTPATIENT)
Dept: INTERNAL MEDICINE | Facility: CLINIC | Age: 50
End: 2020-09-09
Payer: COMMERCIAL

## 2020-09-09 DIAGNOSIS — Z87.891 PERSONAL HISTORY OF TOBACCO USE, PRESENTING HAZARDS TO HEALTH: ICD-10-CM

## 2020-09-09 DIAGNOSIS — J44.1 OBSTRUCTIVE CHRONIC BRONCHITIS WITH EXACERBATION (H): Primary | ICD-10-CM

## 2020-09-09 PROCEDURE — 99214 OFFICE O/P EST MOD 30 MIN: CPT | Mod: 95 | Performed by: NURSE PRACTITIONER

## 2020-09-09 RX ORDER — AZITHROMYCIN 250 MG/1
TABLET, FILM COATED ORAL
Qty: 6 TABLET | Refills: 0 | Status: SHIPPED | OUTPATIENT
Start: 2020-09-09 | End: 2020-09-14

## 2020-09-09 RX ORDER — PREDNISONE 20 MG/1
20 TABLET ORAL DAILY
Qty: 7 TABLET | Refills: 0 | Status: SHIPPED | OUTPATIENT
Start: 2020-09-09 | End: 2020-09-16

## 2020-09-09 RX ORDER — BENZONATATE 200 MG/1
200 CAPSULE ORAL 3 TIMES DAILY PRN
Qty: 30 CAPSULE | Refills: 0 | Status: SHIPPED | OUTPATIENT
Start: 2020-09-09 | End: 2020-10-15

## 2020-09-09 NOTE — PATIENT INSTRUCTIONS
Obstructive chronic bronchitis with exacerbation (H)   - Rx's for:    predniSONE (DELTASONE) 20 MG tablet; Take 1 tablet (20 mg) by mouth daily for 7 days    azithromycin (ZITHROMAX) 250 MG tablet; Take 2 tablets (500 mg) by mouth daily for 1 day, THEN 1 tablet (250 mg) daily for 4 days.    benzonatate (TESSALON) 200 MG capsule; Take 1 capsule (200 mg) by mouth 3 times daily as needed for cough  - please print out this order and find out from patient where to fax this DME order (old one broke):    Nebulizer and Supplies Order for DME - ONLY FOR DME    Personal history of tobacco use, presenting hazards to health  - working on cutting back; states down to 4 cigs/day using Nicotine patch and gum

## 2020-09-09 NOTE — TELEPHONE ENCOUNTER
Patient states she gets upper respiratory illnesses regularly and needs Zpack and steroid to recover.  Complains of tight chest, non-productive cough, wheezing and is asking to be treated. She has had no fever as far as she knows.   Advised patient that a visit would be needed, nothing available in clinic so scheduled her for a video visit today.  .SERJIO Chandra R.N.

## 2020-09-09 NOTE — PROGRESS NOTES
"Swetha Patterson is a 50 year old female who is being evaluated via a billable video visit.      The patient has been notified of following:     \"This video visit will be conducted via a call between you and your physician/provider. We have found that certain health care needs can be provided without the need for an in-person physical exam.  This service lets us provide the care you need with a video conversation.  If a prescription is necessary we can send it directly to your pharmacy.  If lab work is needed we can place an order for that and you can then stop by our lab to have the test done at a later time.    Video visits are billed at different rates depending on your insurance coverage.  Please reach out to your insurance provider with any questions.    If during the course of the call the physician/provider feels a video visit is not appropriate, you will not be charged for this service.\"    Patient has given verbal consent for Video visit? Yes  How would you like to obtain your AVS? Mail a copy  If you are dropped from the video visit, the video invite should be resent to: Text to cell phone: [phone]  Will anyone else be joining your video visit? No      Subjective     Swetha Patterson is a 50 year old female who presents today via video visit for the following health issues:    Patient complains of an URI and also needs a new nebulizer machine    HPI       Video Start Time: 2:13 PM    See above. Reports another respiratory infection that started 2 days ago; gets one monthly and usually needs Prednisone and Z-pac.  Reports discolored phelgm. Just moved and found the neb machine broken so needs replaced with tubing. Also for Tessalon Perles for the cough.    No fever.  Cough and shortness of breathe with wheezing.  No chest pain.  Not been around anyone with Covid.    Reviewed PMH and current medications.      Review of Systems   Constitutional, HEENT, cardiovascular, pulmonary, gi and gu systems are " negative, except as otherwise noted.      Objective           Vitals:  No vitals were obtained today due to virtual visit.    Physical Exam     GENERAL: alert and no distress  EYES: Eyes grossly normal to inspection.  No discharge or erythema, or obvious scleral/conjunctival abnormalities.  RESP: No audible wheeze, cough, or visible cyanosis.  + visible retractions or increased work of breathing.    SKIN: Visible skin clear. No significant rash, abnormal pigmentation or lesions.  NEURO: Cranial nerves grossly intact.  Mentation and speech appropriate for age.  PSYCH: Mentation appears normal, affect normal/bright, judgement and insight intact, normal speech and appearance well-groomed.              Assessment & Plan     Obstructive chronic bronchitis with exacerbation (H)   - Rx's for:    predniSONE (DELTASONE) 20 MG tablet; Take 1 tablet (20 mg) by mouth daily for 7 days    azithromycin (ZITHROMAX) 250 MG tablet; Take 2 tablets (500 mg) by mouth daily for 1 day, THEN 1 tablet (250 mg) daily for 4 days.    benzonatate (TESSALON) 200 MG capsule; Take 1 capsule (200 mg) by mouth 3 times daily as needed for cough  - please print out this order and find out from patient where to fax this DME order (old one broke):    Nebulizer and Supplies Order for DME - ONLY FOR DME    Personal history of tobacco use, presenting hazards to health  - working on cutting back; states down to 4 cigs/day using Nicotine patch and gum  - complimented on cutting back with goal to quit         Patient instructions:  See above.    No follow-ups on file.    Sara Leary CNP  [facility]      Video-Visit Details    Type of service:  Video Visit    Video End Time:2:25 PM    Originating Location (pt. Location): Home    Distant Location (provider location):  [facility]     Platform used for Video Visit: Lexx

## 2020-09-09 NOTE — TELEPHONE ENCOUNTER
Patient missed video visit with Dr. Low.  Advised and patient that MD knows of no other meds to try for Fibromyalgia and she needs to call the pain clinic and schedule with them.  She states she never received a referral to pain clinic.  Needs referral and would like referral mailed to her home.    SERJIO Chandra R.N.

## 2020-09-10 ENCOUNTER — TELEPHONE (OUTPATIENT)
Dept: INTERNAL MEDICINE | Facility: CLINIC | Age: 50
End: 2020-09-10

## 2020-09-10 DIAGNOSIS — F41.9 ANXIETY: ICD-10-CM

## 2020-09-10 DIAGNOSIS — R73.9 BLOOD GLUCOSE ELEVATED: Primary | ICD-10-CM

## 2020-09-10 DIAGNOSIS — Z71.6 ENCOUNTER FOR SMOKING CESSATION COUNSELING: ICD-10-CM

## 2020-09-10 NOTE — TELEPHONE ENCOUNTER
Patient calling  She moved and in the process dropped her  ACCU-CHEK ALLYSON PLUS  Patient requesting a new machine, lancets and test strips  Patient uses [facility] in [place]  Ok to call and lm [phone]

## 2020-09-11 DIAGNOSIS — J44.9 COPD, MODERATE (H): ICD-10-CM

## 2020-09-11 DIAGNOSIS — J30.89 DUST ALLERGY: ICD-10-CM

## 2020-09-11 RX ORDER — HYDROXYZINE HYDROCHLORIDE 50 MG/1
TABLET, FILM COATED ORAL
Qty: 180 TABLET | Refills: 0 | Status: SHIPPED | OUTPATIENT
Start: 2020-09-11 | End: 2020-10-18

## 2020-09-11 RX ORDER — BUPROPION HYDROCHLORIDE 150 MG/1
TABLET ORAL
Qty: 90 TABLET | Refills: 0 | Status: SHIPPED | OUTPATIENT
Start: 2020-09-11 | End: 2020-12-31

## 2020-09-11 NOTE — TELEPHONE ENCOUNTER
Pending Prescriptions:                       Disp   Refills    buPROPion (WELLBUTRIN XL) 150 MG 24 hr tab*90 tab*0        Sig: TAKE ONE TABLET BY MOUTH EVERY MORNING    hydrOXYzine (ATARAX) 50 MG tablet [Pharmac*180 ta*0        Sig: TAKE TWO TABLETS BY MOUTH THREE TIMES A DAY

## 2020-09-13 RX ORDER — MONTELUKAST SODIUM 10 MG/1
1 TABLET ORAL AT BEDTIME
Qty: 90 TABLET | Refills: 4 | Status: SHIPPED | OUTPATIENT
Start: 2020-09-13 | End: 2021-11-01

## 2020-09-18 ENCOUNTER — TELEPHONE (OUTPATIENT)
Dept: NEUROLOGY | Facility: CLINIC | Age: 50
End: 2020-09-18

## 2020-09-18 DIAGNOSIS — G89.29 CHRONIC RADICULAR LUMBAR PAIN: Primary | ICD-10-CM

## 2020-09-18 DIAGNOSIS — M54.16 CHRONIC RADICULAR LUMBAR PAIN: Primary | ICD-10-CM

## 2020-09-21 ENCOUNTER — MYC REFILL (OUTPATIENT)
Dept: INTERNAL MEDICINE | Facility: CLINIC | Age: 50
End: 2020-09-21

## 2020-09-21 DIAGNOSIS — J44.9 COPD, MODERATE (H): ICD-10-CM

## 2020-09-22 NOTE — TELEPHONE ENCOUNTER
Pending Prescriptions:                       Disp   Refills    albuterol (PROAIR HFA/PROVENTIL HFA/VENTOL*18 g   3        Sig: Inhale 2 puffs into the lungs every 6 hours as needed           for shortness of breath / dyspnea

## 2020-09-23 DIAGNOSIS — J44.9 COPD, MODERATE (H): ICD-10-CM

## 2020-09-23 DIAGNOSIS — R22.1 NECK MASS: Primary | ICD-10-CM

## 2020-09-24 ENCOUNTER — TELEPHONE (OUTPATIENT)
Dept: INTERNAL MEDICINE | Facility: CLINIC | Age: 50
End: 2020-09-24

## 2020-09-24 DIAGNOSIS — M79.7 FIBROMYALGIA: Primary | ICD-10-CM

## 2020-09-24 DIAGNOSIS — M62.838 MUSCLE SPASM: ICD-10-CM

## 2020-09-24 RX ORDER — ALBUTEROL SULFATE 90 UG/1
2 AEROSOL, METERED RESPIRATORY (INHALATION) EVERY 6 HOURS PRN
Qty: 18 G | Refills: 3 | Status: ON HOLD | OUTPATIENT
Start: 2020-09-24 | End: 2020-11-20

## 2020-09-24 RX ORDER — ALBUTEROL SULFATE 90 UG/1
AEROSOL, METERED RESPIRATORY (INHALATION)
Refills: 0 | OUTPATIENT
Start: 2020-09-24

## 2020-09-24 NOTE — TELEPHONE ENCOUNTER
Patient calls, she is having a lot of back and leg pain from fibromyalgia that his causing a lot spasms. She is in a lot of pain and nothing she is doing helps - did not get much sleep last night due to pain. She asks if she can get a prescription for a muscle relaxant - she took Flexeril in the past and this worked for her.     She has not gotten an appointment at the pain clinic yet, states they didn't have any openings and told her to call back on Monday to try to schedule.

## 2020-09-25 DIAGNOSIS — Z11.59 ENCOUNTER FOR SCREENING FOR OTHER VIRAL DISEASES: ICD-10-CM

## 2020-09-25 PROCEDURE — U0003 INFECTIOUS AGENT DETECTION BY NUCLEIC ACID (DNA OR RNA); SEVERE ACUTE RESPIRATORY SYNDROME CORONAVIRUS 2 (SARS-COV-2) (CORONAVIRUS DISEASE [COVID-19]), AMPLIFIED PROBE TECHNIQUE, MAKING USE OF HIGH THROUGHPUT TECHNOLOGIES AS DESCRIBED BY CMS-2020-01-R: HCPCS | Performed by: INTERNAL MEDICINE

## 2020-09-26 LAB
SARS-COV-2 RNA SPEC QL NAA+PROBE: NOT DETECTED
SPECIMEN SOURCE: NORMAL

## 2020-09-27 RX ORDER — CYCLOBENZAPRINE HCL 5 MG
5 TABLET ORAL 3 TIMES DAILY PRN
Qty: 30 TABLET | Refills: 1 | Status: SHIPPED | OUTPATIENT
Start: 2020-09-27 | End: 2021-10-25

## 2020-09-28 DIAGNOSIS — R73.03 PREDIABETES: ICD-10-CM

## 2020-09-28 DIAGNOSIS — M79.7 FIBROMYALGIA: ICD-10-CM

## 2020-09-29 ENCOUNTER — TELEPHONE (OUTPATIENT)
Dept: INTERNAL MEDICINE | Facility: CLINIC | Age: 50
End: 2020-09-29

## 2020-09-29 ENCOUNTER — NURSE TRIAGE (OUTPATIENT)
Dept: NURSING | Facility: CLINIC | Age: 50
End: 2020-09-29

## 2020-09-29 ENCOUNTER — HOSPITAL ENCOUNTER (EMERGENCY)
Facility: CLINIC | Age: 50
Discharge: HOME | End: 2020-09-29
Attending: EMERGENCY MEDICINE | Admitting: EMERGENCY MEDICINE
Payer: COMMERCIAL

## 2020-09-29 ENCOUNTER — APPOINTMENT (OUTPATIENT)
Dept: CT IMAGING | Facility: CLINIC | Age: 50
End: 2020-09-29
Attending: EMERGENCY MEDICINE
Payer: COMMERCIAL

## 2020-09-29 VITALS
HEART RATE: 81 BPM | RESPIRATION RATE: 16 BRPM | SYSTOLIC BLOOD PRESSURE: 108 MMHG | OXYGEN SATURATION: 99 % | DIASTOLIC BLOOD PRESSURE: 76 MMHG | TEMPERATURE: 97.8 F

## 2020-09-29 DIAGNOSIS — K92.1 HEMATOCHEZIA: ICD-10-CM

## 2020-09-29 DIAGNOSIS — R10.84 ABDOMINAL PAIN, GENERALIZED: ICD-10-CM

## 2020-09-29 LAB
ANION GAP SERPL CALCULATED.3IONS-SCNC: 5 MMOL/L (ref 3–14)
BUN SERPL-MCNC: 19 MG/DL (ref 7–30)
CALCIUM SERPL-MCNC: 9.3 MG/DL (ref 8.5–10.1)
CHLORIDE SERPL-SCNC: 108 MMOL/L (ref 94–109)
CO2 SERPL-SCNC: 24 MMOL/L (ref 20–32)
CREAT BLD-MCNC: 1.1 MG/DL (ref 0.52–1.04)
CREAT SERPL-MCNC: 1.07 MG/DL (ref 0.52–1.04)
ERYTHROCYTE [DISTWIDTH] IN BLOOD BY AUTOMATED COUNT: 15.8 % (ref 10–15)
GFR SERPL CREATININE-BSD FRML MDRD: 52 ML/MIN/{1.73_M2}
GFR SERPL CREATININE-BSD FRML MDRD: 60 ML/MIN/{1.73_M2}
GLUCOSE SERPL-MCNC: 89 MG/DL (ref 70–99)
HCT VFR BLD AUTO: 39.6 % (ref 35–47)
HGB BLD-MCNC: 12.3 G/DL (ref 11.7–15.7)
INR PPP: 0.94 (ref 0.86–1.14)
MCH RBC QN AUTO: 26.3 PG (ref 26.5–33)
MCHC RBC AUTO-ENTMCNC: 31.1 G/DL (ref 31.5–36.5)
MCV RBC AUTO: 85 FL (ref 78–100)
PLATELET # BLD AUTO: 351 10E9/L (ref 150–450)
POTASSIUM SERPL-SCNC: 4.6 MMOL/L (ref 3.4–5.3)
RBC # BLD AUTO: 4.67 10E12/L (ref 3.8–5.2)
SODIUM SERPL-SCNC: 137 MMOL/L (ref 133–144)
WBC # BLD AUTO: 9 10E9/L (ref 4–11)

## 2020-09-29 PROCEDURE — 99285 EMERGENCY DEPT VISIT HI MDM: CPT | Mod: 25

## 2020-09-29 PROCEDURE — 25000128 H RX IP 250 OP 636: Performed by: EMERGENCY MEDICINE

## 2020-09-29 PROCEDURE — 96376 TX/PRO/DX INJ SAME DRUG ADON: CPT

## 2020-09-29 PROCEDURE — 80048 BASIC METABOLIC PNL TOTAL CA: CPT | Performed by: EMERGENCY MEDICINE

## 2020-09-29 PROCEDURE — 74177 CT ABD & PELVIS W/CONTRAST: CPT

## 2020-09-29 PROCEDURE — 85027 COMPLETE CBC AUTOMATED: CPT | Performed by: EMERGENCY MEDICINE

## 2020-09-29 PROCEDURE — 85610 PROTHROMBIN TIME: CPT | Performed by: EMERGENCY MEDICINE

## 2020-09-29 PROCEDURE — 82565 ASSAY OF CREATININE: CPT

## 2020-09-29 PROCEDURE — 96374 THER/PROPH/DIAG INJ IV PUSH: CPT | Mod: 59

## 2020-09-29 PROCEDURE — 84702 CHORIONIC GONADOTROPIN TEST: CPT | Mod: GZ

## 2020-09-29 RX ORDER — IOPAMIDOL 755 MG/ML
500 INJECTION, SOLUTION INTRAVASCULAR ONCE
Status: COMPLETED | OUTPATIENT
Start: 2020-09-29 | End: 2020-09-29

## 2020-09-29 RX ORDER — HYDROMORPHONE HYDROCHLORIDE 1 MG/ML
0.5 INJECTION, SOLUTION INTRAMUSCULAR; INTRAVENOUS; SUBCUTANEOUS
Status: COMPLETED | OUTPATIENT
Start: 2020-09-29 | End: 2020-09-29

## 2020-09-29 RX ORDER — DICYCLOMINE HCL 20 MG
20 TABLET ORAL 4 TIMES DAILY PRN
Qty: 20 TABLET | Refills: 0 | Status: SHIPPED | OUTPATIENT
Start: 2020-09-29 | End: 2020-10-15

## 2020-09-29 RX ADMIN — HYDROMORPHONE HYDROCHLORIDE 0.5 MG: 1 INJECTION, SOLUTION INTRAMUSCULAR; INTRAVENOUS; SUBCUTANEOUS at 16:11

## 2020-09-29 RX ADMIN — HYDROMORPHONE HYDROCHLORIDE 0.5 MG: 1 INJECTION, SOLUTION INTRAMUSCULAR; INTRAVENOUS; SUBCUTANEOUS at 15:36

## 2020-09-29 RX ADMIN — HYDROMORPHONE HYDROCHLORIDE 0.5 MG: 1 INJECTION, SOLUTION INTRAMUSCULAR; INTRAVENOUS; SUBCUTANEOUS at 17:04

## 2020-09-29 RX ADMIN — IOPAMIDOL 100 ML: 755 INJECTION, SOLUTION INTRAVENOUS at 17:13

## 2020-09-29 ASSESSMENT — ENCOUNTER SYMPTOMS
NAUSEA: 1
VOMITING: 0
RECTAL PAIN: 1
ABDOMINAL PAIN: 1
FEVER: 0
CONSTIPATION: 0
ANAL BLEEDING: 1
DIARRHEA: 0

## 2020-09-29 NOTE — DISCHARGE INSTRUCTIONS
Discharge Instructions  Abdominal Pain    Abdominal pain (belly pain) can be caused by many things. Your evaluation today does not show the exact cause for your pain. Your provider today has decided that it is unlikely your pain is due to a life threatening problem, or a problem requiring surgery or hospital admission. Sometimes those problems cannot be found right away, so it is very important that you follow up as directed.  Sometimes only the changes which occur over time allow the cause of your pain to be found.    Generally, every Emergency Department visit should have a follow-up clinic visit with either a primary or a specialty clinic/provider. Please follow-up as instructed by your emergency provider today. With abdominal pain, we often recommend very close follow-up, such as the following day.    ADULTS:  Return to the Emergency Department right away if:    You get an oral temperature above 102oF or as directed by your provider.  You have blood in your stools. This may be bright red or appear as black, tarry stools.    You keep vomiting (throwing up) or cannot drink liquids.  You see blood when you vomit.   You cannot have a bowel movement or you cannot pass gas.  Your stomach gets bloated or bigger.  Your skin or the whites of your eyes look yellow.  You faint.  You have bloody, frequent or painful urination (peeing).  You have new symptoms or anything that worries you.    CHILDREN:  Return to the Emergency Department right away if your child has any of the above-listed symptoms or the following:    Pushes your hand away or screams/cries when his/her belly is touched.  You notice your child is very fussy or weak.  Your child is very tired and is too tired to eat or drink.  Your child is dehydrated.  Signs of dehydration can be:  Significant change in the amount of wet diapers/urine.  Your infant or child starts to have dry mouth and lips, or no saliva (spit) or tears.    PREGNANT WOMEN:  Return to the  Emergency Department right away if you have any of the above-listed symptoms or the following:    You have bleeding, leaking fluid or passing tissue from the vagina.  You have worse pain or cramping, or pain in your shoulder or back.  You have vomiting that will not stop.  You have a temperature of 100oF or more.  Your baby is not moving as much as usual.  You faint.  You get a bad headache with or without eye problems and abdominal pain.  You have a seizure.  You have unusual discharge from your vagina and abdominal pain.    Abdominal pain is pretty common during pregnancy.  Your pain may or may not be related to your pregnancy. You should follow-up closely with your OB provider so they can evaluate you and your baby.  Until you follow-up with your regular provider, do the following:     Avoid sex and do not put anything in your vagina.  Drink clear fluids.  Only take medications approved by your provider.    MORE INFORMATION:    Appendicitis:  A possible cause of abdominal pain in any person who still has their appendix is acute appendicitis. Appendicitis is often hard to diagnose.  Testing does not always rule out early appendicitis or other causes of abdominal pain. Close follow-up with your provider and re-evaluations may be needed to figure out the reason for your abdominal pain.    Follow-up:  It is very important that you make an appointment with your clinic and go to the appointment.  If you do not follow-up with your primary provider, it may result in missing an important development which could result in permanent injury or disability and/or lasting pain.  If there is any problem keeping your appointment, call your provider or return to the Emergency Department.    Medications:  Take your medications as directed by your provider today.  Before using over-the-counter medications, ask your provider and make sure to take the medications as directed.  If you have any questions about medications, ask your  "provider.    Diet:  Resume your normal diet as much as possible, but do not eat fried, fatty or spicy foods while you have pain.  Do not drink alcohol or have caffeine.  Do not smoke tobacco.    Probiotics: If you have been given an antibiotic, you may want to also take a probiotic pill or eat yogurt with live cultures. Probiotics have \"good bacteria\" to help your intestines stay healthy. Studies have shown that probiotics help prevent diarrhea (loose stools) and other intestine problems (including C. diff infection) when you take antibiotics. You can buy these without a prescription in the pharmacy section of the store.     If you were given a prescription for medicine here today, be sure to read all of the information (including the package insert) that comes with your prescription.  This will include important information about the medicine, its side effects, and any warnings that you need to know about.  The pharmacist who fills the prescription can provide more information and answer questions you may have about the medicine.  If you have questions or concerns that the pharmacist cannot address, please call or return to the Emergency Department.       Remember that you can always come back to the Emergency Department if you are not able to see your regular provider in the amount of time listed above, if you get any new symptoms, or if there is anything that worries you.  Discharge Instructions  Gastrointestinal (GI) Bleeding    You have been seen today because of gastrointestinal (GI) bleeding, bleeding somewhere along your digestive tract.  Most common symptoms are blood in the stool or when you have a bowel movement.  The most common causes of minor GI bleeding are ulcers and hemorrhoids.  Other conditions that cause bleeding include abnormal blood vessels in your GI tract, diverticulosis, inflammatory bowel disease, and cancer.  Fortunately, many cases of GI bleeding are not immediately life-threatening and " it does not appear that your bleeding is serious enough to require admission to the hospital.    Generally, every Emergency Department visit should have a follow-up clinic visit with either a primary or a specialty clinic/provider. Please follow-up as instructed by your emergency provider today.    Return to the Emergency Department right away if you:  Develop fever with a temperature above 100.4 F.  Vomit (throw up) blood or something that looks like coffee grounds.  Have a bowel movement that looks like tar or has a large amount of blood in it.  Feel weak, light-headed, or faint.  Have a racing heartbeat.  Have abdominal (belly) pain that is new or increasing.  Have new symptoms that worry you.    At your follow up, your regular provider or GI specialist may order further testing such as:  Blood and stool tests.  Endoscopy and/or colonoscopy, where a tube with a camera is used to look at your digestive tract.  Other very specialized tests depending on your symptoms.    What can I do to help myself?  Take any acid reducing medication prescribed by your provider.  Avoid over the counter medications such as aspirin and Advil , Motrin  (ibuprofen) that can thin your blood or irritate your stomach, making ulcers worse.  If you were given a prescription for medicine here today, be sure to read all of the information (including the package insert) that comes with your prescription.  This will include important information about the medicine, its side effects, and any warnings that you need to know about.  The pharmacist who fills the prescription can provide more information and answer questions you may have about the medicine.  If you have questions or concerns that the pharmacist cannot address, please call or return to the Emergency Department.   Remember that you can always come back to the Emergency Department if you are not able to see your regular provider in the amount of time listed above, if you get any new  symptoms, or if there is anything that worries you.

## 2020-09-29 NOTE — TELEPHONE ENCOUNTER
Divya Abreu, RN  p [facility] 2 hours ago (10:07 AM)      FYI going to [facility]    Routing comment      Received message above from triage team. Note below reviewed by writer. Message sent to Dr. Wiley with update.     Prachi Ignacio LPN

## 2020-09-29 NOTE — ED PROVIDER NOTES
History     Chief Complaint:  Rectal Bleeding      HPI   Swetha Patterson is a 50 year old female with a history of hypertension, hyperlipidemia, and DIAZ who presents for evaluation of rectal bleeding. Yesterday evening around 2300 the patient quickly developed the sensation of needing to have a bowel movement and shortly afterwards she had an episode of rectal bleeding where she passed bright red blood with some black appearing blood as well without any stool present. Since then the patient has had some rectal pain as well as minor back pain, lower abdominal pain, and nausea. Due to concern for this rectal bleeding the patient came into the ED for evaluation. She reports that she has had intermittent rectal bleeding for the last several months and has been following with gastroenterology. She last had a colonoscopy several years ago with some polyp removal. She has not had any recent diarrhea or constipation and prior to last night had been having normal stools. Otherwise, she denies any recent fever, vomiting, vaginal bleeding, or other abnormal bleeding. She denies any history of hemorrhoids.     Allergies:  Codeine   Cyclobenzaprine   Hydrocodone  Sulfa drugs   Gabapentin      Medications:    Tylenol   Albuterol inhaler  Albuterol nebulizer solution  Amlodipine   Lipitor   Tessalon   Symbicort inhaler   Wellbutrin XL   Cetirizine Hcl   Klonopin   Flexeril  Doxycyline hyclate    Fluticasone  Lasix   Glipizide  Atarax  Ibuprofen   Lamictal   Lisinopril  Metformin   Singulair   Nitroglycerin   Pamelor  Omeprazole  Zofran   Lyrica  Prednisone   Senna-docusate   Bactrim DS    Chantix   Spiriva handihaler      Past Medical History:    Anxiety  Asthma  Chronic pain  COPD   DIAZ   Depression   Emphysema with chronic bronchitis   Esophageal reflux  GERD   Hypertension   Peripheral artery disease   Hyperlipidemia  Liver disease     Past Surgical History:    Arthroscopy shoulder decompression, left   Biopsy artery  temporal, left  Bronchial thermoplasty   Appendectomy  Colonoscopy  Discectomy, fusion cervical anterior one level, combined  Polyp removal from vocal cords   Excise nose mediastinal   Laparoscopic cholecystectomy   Thoracoscopy   Tonsillectomy     Family History:    Heart disease - mother   Hypertension - Mother and sister   Cerebrovascular disease - Mother and sister   Depression - Mother   Gallbladder disease - Mother   Asthma - Mother and sister     Social History:  Tobacco use:    Current every day smoker - 0.50 packs / day for 30 years   Alcohol use:    Negative   Drug use:   Negative   Marital status:       Accompanied to ED by:  Alone, driven by friend      Review of Systems   Constitutional: Negative for fever.   Gastrointestinal: Positive for abdominal pain, anal bleeding, nausea and rectal pain. Negative for constipation, diarrhea and vomiting.   Genitourinary: Negative for vaginal bleeding.   All other systems reviewed and are negative.      Physical Exam   First Vitals:  BP: (!) 109/91  Pulse: 90  Temp: 97.8  F (36.6  C)  Resp: 20  SpO2: 97 %      Physical Exam    Constitutional:  Pleasant, age appropriate.    Eyes:    Conjunctiva normal  Neck:    Supple, no meningismus.     CV:     Regular rate and rhythm.      No murmurs, rubs or gallops.     No lower extremity edema.  PULM:    Clear to auscultation bilateral.       No respiratory distress.      Good air exchange.     No rales or wheezing.  ABD:    Soft, non-distended.       Moderate diffuse lower abdominal tenderness.     Bowel sounds normal.     No pulsatile masses.       No rebound, guarding or rigidity.     No CVA tenderness.   :    No external hemorrhoids.     No anal fissure.     Good sphincter tone.      No rectal mass.     No blood on gloved finger.  MSK:     No gross deformity to all four extremities.   LYMPH:   No cervical lymphadenopathy.  NEURO:   Alert.  Good muscular tone, no atrophy.   Skin:    Warm, dry and intact.    Psych:     Mood is good and affect is appropriate.      Emergency Department Course     Imaging:  Radiographic findings were communicated with the patient who voiced understanding of the findings.    CT Abdomen Pelvis w Contrast   Final Result   IMPRESSION:    1.  No acute abnormality in the abdomen or pelvis.   2.  Fatty liver.           Laboratory:  CBC: WNL (WBC 9.0, HGB 12.3, )   BMP: Creatinine 1.07 high, GFR estimate 60 low, o/w WNL   Creatinine POCT: Creatinine 1.1 high, GFR estimate 52 low   INR: 0.94      Interventions:  1536 Dilaudid 0.5 mg IV   1611 Dilaudid 0.5 mg IV   1704 Dilaudid 0.5 mg IV      Emergency Department Course:  Nursing notes and vitals reviewed.  1515: I performed an exam of the patient as documented above.     I personally reviewed the laboratory results with the patient and answered all related questions prior to discharge.     Findings and plan explained to the Patient. Patient discharged home with instructions regarding supportive care, medications, and reasons to return. The importance of close follow-up was reviewed.     Impression & Plan      Medical Decision Makin-year-old female presented to the ED with reports of hematochezia, rectal pain and associated lower abdominal discomfort.  No evidence of ongoing bleeding during evaluation.  No convincing evidence of perirectal or anal abscess.  Laboratory studies are reassuring.  CT scan undertaken to evaluate for colitis, diverticulitis and perirectal abscess.  CT scan is unremarkable for acute disease.  Patient safe for discharge home.  Recommend that she follow-up with her GI physician for consideration of colonoscopy if symptoms persist.  Supportive measures otherwise indicated.     Diagnosis:    ICD-10-CM    1. Hematochezia  K92.1    2. Abdominal pain, generalized  R10.84        Disposition:  Discharged to home.     Discharge Medications:  Discharge Medication List as of 2020  6:34 PM      START taking these medications     Details   dicyclomine (BENTYL) 20 MG tablet Take 1 tablet (20 mg) by mouth 4 times daily as needed (abdominal pain), Disp-20 tablet,R-0, Local Print             I, Billy Le, am serving as a scribe at 3:15 PM on 9/29/2020 to document services personally performed by Dr. Sharp, based on my observations and the provider's statements to me.      [facility] EMERGENCY DEPARTMENT       Valdez Sharp MD  09/30/20 [id number]

## 2020-09-29 NOTE — TELEPHONE ENCOUNTER
"    Reason for Disposition    Bloody, black, or tarry bowel movements    Additional Information    Negative: Passed out (i.e., fainted, collapsed and was not responding)    Negative: Shock suspected (e.g., cold/pale/clammy skin, too weak to stand, low BP, rapid pulse)    Negative: Vomiting red blood or black (coffee ground) material    Negative: Sounds like a life-threatening emergency to the triager    Negative: Diarrhea is the main symptom    Negative: Rectal symptoms    Answer Assessment - Initial Assessment Questions  1. APPEARANCE of BLOOD: \"What color is it?\" \"Is it passed separately, on the surface of the stool, or mixed in with the stool?\"       Black and bright red blood, no stool  2. AMOUNT: \"How much blood was passed?\"       Large amount  3. FREQUENCY: \"How many times has blood been passed with the stools?\"       Once   4. ONSET: \"When was the blood first seen in the stools?\" (Days or weeks)       9:30 last night  5. DIARRHEA: \"Is there also some diarrhea?\" If so, ask: \"How many diarrhea stools were passed in past 24 hours?\"       Thought she was going to have diarrhea last night, but no stool only blood  6. CONSTIPATION: \"Do you have constipation?\" If so, \"How bad is it?\"      No  7. RECURRENT SYMPTOMS: \"Have you had blood in your stools before?\" If so, ask: \"When was the last time?\" and \"What happened that time?\"       No  8. BLOOD THINNERS: \"Do you take any blood thinners?\" (e.g., Coumadin/warfarin, Pradaxa/dabigatran, aspirin)      No  9. OTHER SYMPTOMS: \"Do you have any other symptoms?\"  (e.g., abdominal pain, vomiting, dizziness, fever)      Abdominal pain upper abdomen near esophagus for the past 6 months, but not as bad as it is now.  Rating 8/10.  Having right sided lower back pain.  Intermittent nausea, denies vomiting, fever, abdominal distention or bloating.  Denies dizziness/lightheaded feeling, faint, weakness.  Reports \" I just don't feel good\"  10. PREGNANCY: \"Is there any chance you are " "pregnant?\" \"When was your last menstrual period?\"        N/A    Protocols used: RECTAL BLEEDING-A-OH      "

## 2020-09-29 NOTE — TELEPHONE ENCOUNTER
Pending Prescriptions:                       Disp   Refills    pregabalin (LYRICA) 25 MG capsule [Pharmac*90 cap*1        Sig: TAKE ONE CAPSULE BY MOUTH THREE TIMES A DAY    glipiZIDE (GLUCOTROL XL) 2.5 MG 24 hr tabl*30 tab*1        Sig: TAKE ONE TABLET BY MOUTH EVERY DAY

## 2020-09-29 NOTE — ED AVS SNAPSHOT
[facility]  [address]  [facility] [address]  Phone:  [phone]  Fax:  [phone]                                    Swetha Patterson   MRN: [id number]    Department:  [facility]   Date of Visit:  9/29/2020           After Visit Summary Signature Page    I have received my discharge instructions, and my questions have been answered. I have discussed any challenges I see with this plan with the nurse or doctor.    ..........................................................................................................................................  Patient/Patient Representative Signature      ..........................................................................................................................................  Patient Representative Print Name and Relationship to Patient    ..................................................               ................................................  Date                                   Time    ..........................................................................................................................................  Reviewed by Signature/Title    ...................................................              ..............................................  Date                                               Time          22EPIC Rev 08/18

## 2020-09-29 NOTE — ED TRIAGE NOTES
Presents to the ED reporting intermittent rectal bleeding for the past few months. States that bleeding was worse than usual last night and has rectal pain.

## 2020-09-29 NOTE — ED NOTES
A/O. VSS. PIV removed. Pt verbalized understanding of d/c instructions and ambulated to lobby steady gait.   Script rx  given to pt at time of d/c

## 2020-09-29 NOTE — TELEPHONE ENCOUNTER
Patient calling stating she had a bm yesterday evening and noticed a bowl full of blood and black stool.  Patient having low back pain.  Denied any dizziness or any other symptoms.  Patient called [facility] today and was triaged and told to go to ER.  Patient called to speak to primary care provider RN and this RN also advised patient to go to the ER.  Patient verbalized understanding and will go to the ER.

## 2020-09-30 RX ORDER — PREGABALIN 25 MG/1
CAPSULE ORAL
Qty: 90 CAPSULE | Refills: 1 | Status: SHIPPED | OUTPATIENT
Start: 2020-09-30 | End: 2020-10-12 | Stop reason: DRUGHIGH

## 2020-09-30 RX ORDER — GLIPIZIDE 2.5 MG/1
TABLET, EXTENDED RELEASE ORAL
Qty: 30 TABLET | Refills: 1 | Status: SHIPPED | OUTPATIENT
Start: 2020-09-30 | End: 2020-12-09

## 2020-10-01 ENCOUNTER — TELEPHONE (OUTPATIENT)
Dept: INTERNAL MEDICINE | Facility: CLINIC | Age: 50
End: 2020-10-01

## 2020-10-01 DIAGNOSIS — J44.9 COPD, MODERATE (H): Primary | ICD-10-CM

## 2020-10-01 DIAGNOSIS — K92.1 HEMATOCHEZIA: Primary | ICD-10-CM

## 2020-10-01 NOTE — TELEPHONE ENCOUNTER
Pt has an appt with pcp on 10/22 but she would like to discuss with a RN in regards to her ER visit on 9/29. Please call pt

## 2020-10-02 LAB — B-HCG FREE SERPL-ACNC: <5 IU/L

## 2020-10-02 NOTE — TELEPHONE ENCOUNTER
Call to patient. States ER doctor told her everything looked ok on the CT but she needs to have a repeat colonoscopy. Patient has a hospital follow up scheduled on 10/22 but doesn't feel she should wait until then to have the colonoscopy ordered. Patient states she has not had a BM since she was in the hospital. Also states she has not been eating very much. Patient was on the toilet during our conversation and states she had a very small piece of BM and there was blood on it. Please advise if colonscopy should be ordered or if patient should be seen for hospital follow up sooner than 10/22.

## 2020-10-03 RX ORDER — ALBUTEROL SULFATE 90 UG/1
2 AEROSOL, METERED RESPIRATORY (INHALATION) EVERY 6 HOURS
Qty: 1 INHALER | Refills: 1 | Status: ON HOLD | OUTPATIENT
Start: 2020-10-03 | End: 2020-11-20

## 2020-10-05 DIAGNOSIS — J44.9 COPD, MODERATE (H): ICD-10-CM

## 2020-10-05 NOTE — TELEPHONE ENCOUNTER
Message from pharmacy RE: PROAIR HFA prescription, pharmacy recommends Albuterol HFA 8.5 GM 90MCG as generic substitution.      Please review.

## 2020-10-05 NOTE — TELEPHONE ENCOUNTER
Left message on patient's voicemail asking her to return call to clinic.  GI referral done for colonoscopy and referral faxed to GI office.  Patient can call to schedule with them [phone].  SERJIO Chandra R.N.

## 2020-10-05 NOTE — TELEPHONE ENCOUNTER
Spoke to patient ans she states that she is a bad patient and [facility] would not do the colonoscopy because of her breathing and her pulling out all of the IV's.  Patient states it has to be scheduled in the OR were she is put completely under. Please address.

## 2020-10-07 ENCOUNTER — HOSPITAL ENCOUNTER (OUTPATIENT)
Dept: CT IMAGING | Facility: CLINIC | Age: 50
Discharge: HOME | End: 2020-10-07
Attending: OTOLARYNGOLOGY | Admitting: OTOLARYNGOLOGY
Payer: COMMERCIAL

## 2020-10-07 DIAGNOSIS — R22.1 NECK MASS: ICD-10-CM

## 2020-10-07 PROCEDURE — 70491 CT SOFT TISSUE NECK W/DYE: CPT

## 2020-10-07 PROCEDURE — 71260 CT THORAX DX C+: CPT

## 2020-10-07 PROCEDURE — 250N000009 HC RX 250: Performed by: OTOLARYNGOLOGY

## 2020-10-07 PROCEDURE — 250N000011 HC RX IP 250 OP 636: Performed by: OTOLARYNGOLOGY

## 2020-10-07 RX ORDER — IOPAMIDOL 755 MG/ML
500 INJECTION, SOLUTION INTRAVASCULAR ONCE
Status: COMPLETED | OUTPATIENT
Start: 2020-10-07 | End: 2020-10-07

## 2020-10-07 RX ADMIN — IOPAMIDOL 100 ML: 755 INJECTION, SOLUTION INTRAVENOUS at 11:19

## 2020-10-07 RX ADMIN — SODIUM CHLORIDE 65 ML: 9 INJECTION, SOLUTION INTRAVENOUS at 11:20

## 2020-10-07 NOTE — TELEPHONE ENCOUNTER
Spoke with endoscopy scheduling at [facility], a new referral needs to be generated and it needs to say colonoscopy requiring deep sedation and choose under [facility] on the referral then instruct patient to call them again to speak with endoscopy nurse about her history.     Referral ready to be signed.   SERJIO Chandra R.N.

## 2020-10-08 ENCOUNTER — TELEPHONE (OUTPATIENT)
Dept: DERMATOLOGY | Facility: CLINIC | Age: 50
End: 2020-10-08

## 2020-10-08 ENCOUNTER — TELEPHONE (OUTPATIENT)
Dept: OTOLARYNGOLOGY | Facility: CLINIC | Age: 50
End: 2020-10-08

## 2020-10-08 DIAGNOSIS — Z11.59 ENCOUNTER FOR SCREENING FOR OTHER VIRAL DISEASES: Primary | ICD-10-CM

## 2020-10-08 NOTE — TELEPHONE ENCOUNTER
[facility]    Phone Message    May a detailed message be left on voicemail: yes     Reason for Call: Other: Pt had to cancel appt for today due to lack of transportation.  I see no appt in Dr Jordan's template.  Can you please see if you can find an appt for her and call to schedule.  thx     Action Taken: Message routed to:  [facility] ([facility] dermatology    Travel Screening: Not Applicable

## 2020-10-08 NOTE — TELEPHONE ENCOUNTER
Left vm message for patient in regards to CT results as per provider. Writers call back number for the day provided on voicemail.

## 2020-10-09 ENCOUNTER — TELEPHONE (OUTPATIENT)
Dept: INTERNAL MEDICINE | Facility: CLINIC | Age: 50
End: 2020-10-09

## 2020-10-09 DIAGNOSIS — M79.7 FIBROMYALGIA: ICD-10-CM

## 2020-10-09 DIAGNOSIS — R07.9 CHEST PAIN, UNSPECIFIED TYPE: ICD-10-CM

## 2020-10-09 NOTE — TELEPHONE ENCOUNTER
Dr. Low  Recertification visit today for this patient.  Ongoing orders:  RN 1w9, 3prn for weekly med set up.    Pt is wondering if you can call in a refill for Albuterol Sulfate HFA Inhaler (not proair) to the [facility], she says every time it gets sent to [facility] they fill it with Proair and she can not tolerate that.    Also, pt needs a refill for Nitroglycerine 0.4 SL tablets (hers are powder).    Pt is also wondering if Lyrica dose 25mg TID can be increased? She states it is not doing anything.     Please note,pts Lamotrigine has been increased to 150mg daily.     Also, pt is not taking symbicort, spiriva, doxycycline, flexeril, ibuprofen, prednisone.     Thank you  Maria Elena Rosen RN, BSN  [email or web address]  [phone]

## 2020-10-12 RX ORDER — NITROGLYCERIN 0.4 MG/1
TABLET SUBLINGUAL
Qty: 25 TABLET | Refills: 0 | Status: SHIPPED | OUTPATIENT
Start: 2020-10-12 | End: 2021-02-17

## 2020-10-12 RX ORDER — PREGABALIN 50 MG/1
50 CAPSULE ORAL 3 TIMES DAILY
Qty: 90 CAPSULE | Refills: 1 | Status: SHIPPED | OUTPATIENT
Start: 2020-10-12 | End: 2021-02-26

## 2020-10-13 RX ORDER — ALBUTEROL SULFATE 90 UG/1
2 AEROSOL, METERED RESPIRATORY (INHALATION) EVERY 6 HOURS
Qty: 3 INHALER | Refills: 3 | Status: ON HOLD | OUTPATIENT
Start: 2020-10-13 | End: 2020-11-20

## 2020-10-13 NOTE — TELEPHONE ENCOUNTER
I already tried, epic does not let me put the new order.  Please call the pharmacy for verbal order

## 2020-10-13 NOTE — TELEPHONE ENCOUNTER
3 calls to [facility] today, kept getting transferred over and over.  Never spoke with any person who could help in this matter.  The person who took the original message did not write down a phone number or reference number so nothing to go on,  Please see rx loaded, it it looks like the covered med please sign it.  SERJIO Chandra R.N.

## 2020-10-14 DIAGNOSIS — Z20.822 ENCOUNTER FOR LABORATORY TESTING FOR COVID-19 VIRUS: Primary | ICD-10-CM

## 2020-10-15 ENCOUNTER — DOCUMENTATION ONLY (OUTPATIENT)
Dept: CARE COORDINATION | Facility: CLINIC | Age: 50
End: 2020-10-15

## 2020-10-15 DIAGNOSIS — F41.9 ANXIETY: ICD-10-CM

## 2020-10-15 ASSESSMENT — MIFFLIN-ST. JEOR: SCORE: 1643.27

## 2020-10-16 ENCOUNTER — OFFICE VISIT (OUTPATIENT)
Dept: INTERNAL MEDICINE | Facility: CLINIC | Age: 50
End: 2020-10-16
Payer: COMMERCIAL

## 2020-10-16 VITALS
OXYGEN SATURATION: 98 % | TEMPERATURE: 98.4 F | SYSTOLIC BLOOD PRESSURE: 98 MMHG | RESPIRATION RATE: 16 BRPM | DIASTOLIC BLOOD PRESSURE: 64 MMHG | HEART RATE: 85 BPM | WEIGHT: 226 LBS | BODY MASS INDEX: 38.79 KG/M2

## 2020-10-16 DIAGNOSIS — R73.03 PREDIABETES: ICD-10-CM

## 2020-10-16 DIAGNOSIS — I10 HTN, GOAL BELOW 140/90: ICD-10-CM

## 2020-10-16 DIAGNOSIS — K62.5 RECTAL BLEEDING: ICD-10-CM

## 2020-10-16 DIAGNOSIS — J44.9 COPD, MODERATE (H): ICD-10-CM

## 2020-10-16 DIAGNOSIS — Z01.818 PREOP GENERAL PHYSICAL EXAM: Primary | ICD-10-CM

## 2020-10-16 LAB
HBA1C MFR BLD: 5.6 % (ref 0–5.6)
HGB BLD-MCNC: 12.6 G/DL (ref 11.7–15.7)

## 2020-10-16 PROCEDURE — 93000 ELECTROCARDIOGRAM COMPLETE: CPT | Performed by: INTERNAL MEDICINE

## 2020-10-16 PROCEDURE — 84132 ASSAY OF SERUM POTASSIUM: CPT | Performed by: INTERNAL MEDICINE

## 2020-10-16 PROCEDURE — 99214 OFFICE O/P EST MOD 30 MIN: CPT | Performed by: INTERNAL MEDICINE

## 2020-10-16 PROCEDURE — 36415 COLL VENOUS BLD VENIPUNCTURE: CPT | Performed by: INTERNAL MEDICINE

## 2020-10-16 PROCEDURE — 85018 HEMOGLOBIN: CPT | Performed by: INTERNAL MEDICINE

## 2020-10-16 PROCEDURE — 83036 HEMOGLOBIN GLYCOSYLATED A1C: CPT | Performed by: INTERNAL MEDICINE

## 2020-10-16 NOTE — PATIENT INSTRUCTIONS
No metformin, glipizide, lisinopril and amlodipine on the morning of colonoscopy   Stop amlodipine.   ASPIRIN: Discontinue ASA 7  days prior to procedure to reduce bleeding risk.     NSAIDS:   Stop  3 days prior to surgery  HOLD Lisinopril  for the 24 hours prior to surgery.     Check with your psychiatrist about psychiatric medications before procedure.

## 2020-10-16 NOTE — PROGRESS NOTES
[place] [address]  [facility] [address]  [phone]  Dept: [phone]    PRE-OP EVALUATION:  Today's date: 10/16/2020    Swetha Patterson (: 1970) presents for pre-operative evaluation assessment as requested by Dr. Howell.  She requires evaluation and anesthesia risk assessment prior to undergoing surgery/procedure for treatment of rectal bleed-  colonoscopy .    Fax number for surgical facility:  [facility]  Primary Physician: Roro Chawla  Type of Anesthesia Anticipated: to be determined    Preop Questionnnaire:  Pre-op Questionnaire 10/16/2020   Surgery Location: [facility]    Surgeon: -Dr Foster    Surgery/Procedure: -Colonoscopy   Surgery Date: -10/22/20   Time of Surgery: -   1. Have you ever had a heart attack or stroke? No   2. Have you ever had surgery on your heart or blood vessels, such as a stent placement, a coronary artery bypass, or surgery on an artery in your head, neck, heart, or legs? No   3. Do you have chest pain with activity? No   4. Do you have a history of  heart failure? No   5. Do you currently have a cold, bronchitis or symptoms of other infection? No   6. Do you have a cough, shortness of breath, or wheezing? No   7. Do you or anyone in your family have previous history of blood clots? No   8. Do you or does anyone in your family have a serious bleeding problem such as prolonged bleeding following surgeries or cuts? No   9. Have you ever had problems with anemia or been told to take iron pills? No   10. Have you had any abnormal blood loss such as black, tarry or bloody stools, or abnormal vaginal bleeding? YES -     11. Have you ever had a blood transfusion? No   12. Are you willing to have a blood transfusion if it is medically needed before, during, or after your surgery? Yes   13. Have you or any of your relatives ever had problems with anesthesia? No   14. Do you have sleep apnea, excessive snoring or  daytime drowsiness? No   15. Do you have any artifical heart valves or other implanted medical devices like a pacemaker, defibrillator, or continuous glucose monitor? No   16. Do you have artificial joints? No   17. Are you allergic to latex? No   18. Is there any chance that you may be pregnant? No         HPI:        COPD - Patient has a longstanding history of COPD . Patient has been doing well overall noting NO SYMPTOMS and continues on medication regimen consisting of inhalers without adverse reactions or side effects.    DEPRESSION - Patient has a long history of Depression and follows up with psychiatrist and on medications.    HYPERLIPIDEMIA - Patient has a long history of  Hyperlipidemia requiring medication for treatment with recent fair control. Patient reports no problems or side effects with the medication.     HYPERTENSION - Patient has history of HTN , currently denies any symptoms referable to elevated blood pressure. Specifically denies chest pain, palpitations, dyspnea, orthopnea, PND or peripheral edema. Blood pressure readings have been in low normal range. Current medication regimen is as listed below. Patient denies any side effects of medication.       MEDICAL HISTORY:     Patient Active Problem List    Diagnosis Date Noted     Obstructive chronic bronchitis with exacerbation (H) 09/09/2020     Priority: Medium     Personal history of tobacco use, presenting hazards to health 09/09/2020     Priority: Medium     Morbid obesity (H) 08/31/2020     Priority: Medium     Claudication of both lower extremities (H) 03/06/2020     Priority: Medium     PAD (peripheral artery disease) (H) 03/06/2020     Priority: Medium     Jaw claudication 03/06/2020     Priority: Medium     Added automatically from request for surgery [id number]       History of opioid abuse (H) Rx was stopped when she failed urine drug test 12/13/2019     Priority: Medium     HTN, goal below 140/90 01/12/2019     Priority: Medium      "Vocal cord polyp 06/28/2018     Priority: Medium     Status post cervical spinal fusion 05/01/2018     Priority: Medium     Suicide attempt (H) 02/08/2018     Priority: Medium     Intentional drug overdose (H) 02/08/2018     Priority: Medium     Cervical HNP C5-6 11/09/2017     Priority: Medium     Opioid type dependence, continuous (H) 11/09/2017     Priority: Medium     DIAZ (nonalcoholic steatohepatitis) 06/17/2016     Priority: Medium     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.  Doesn't drink, but relapsed (doesn't remember).  Was on Valium for 4 years (until October 2017). Xanax since 11/2017.  Managed by hospital.        Past Medical History:   Diagnosis Date     Anxiety state, unspecified      Asthma      Chronic pain     back pain from cyst     Contact dermatitis and other eczema, due to unspecified cause      COPD (chronic obstructive pulmonary disease) (H)      Depressive disorder, not elsewhere classified      Diabetes (H)      Emphysema with chronic bronchitis (H)      Esophageal reflux      Family history of ischemic heart disease      Fibromyalgia      Gastro-oesophageal reflux disease      History of emphysema      Hoarseness      HTN, goal below 140/90      Hyperlipidemia LDL goal <130      Liver disease     \"fatty liver\"     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)      PONV (postoperative nausea and vomiting)      Rectal bleeding      Past Surgical History:   Procedure Laterality " Date     ARTHROSCOPY SHOULDER DECOMPRESSION Left 10/21/2015    Procedure: ARTHROSCOPY SHOULDER DECOMPRESSION;  Surgeon: Julien Milian MD;  Location: [facility] OR     BIOPSY ARTERY TEMPORAL Left 3/11/2020    Procedure: LEFT TEMPORAL ARTERY BIOPSY;  Surgeon: Ibeth Conner MD;  Location: [facility] OR     BRONCHIAL THERMOPLASTY N/A 11/14/2014    Procedure: BRONCHIAL THERMOPLASTY;  Surgeon: Ward Whitaker MD;  Location: [facility]     BRONCHIAL THERMOPLASTY N/A 12/19/2014    Procedure: BRONCHIAL THERMOPLASTY;  Surgeon: Ward Whitaker MD;  Location: [facility] OR     BRONCHIAL THERMOPLASTY N/A 2/6/2015    Procedure: BRONCHIAL THERMOPLASTY;  Surgeon: Ward Whitaker MD;  Location: [facility] OR     C APPENDECTOMY  at age 17     COLONOSCOPY N/A 11/26/2018    Procedure: COLONOSCOPY ([facility]);  Surgeon: Marshall Oakes MD;  Location: [facility] OR     DISCECTOMY, FUSION CERVICAL ANTERIOR ONE LEVEL, COMBINED N/A 5/1/2018    Procedure: COMBINED DISCECTOMY, FUSION CERVICAL ANTERIOR ONE LEVEL;  1.  C5-C6 anterior cervical diskectomy and fusion.    2.  C5-C6 application of intervertebral biomechanical device for interbody fusion purposes.    3.  C5-C6 anterior instrumentation using the standard Kristin InViZia 24 mm plate with four associated bone screws, 12 mm in length.  Inferior screws are fixed.  Superior screws are va     ENT SURGERY  2015    polyps removed from vocal cords      EXCISE NODE MEDIASTINAL  4/26/2013    Procedure: EXCISE NODE MEDIASTINAL;;  Surgeon: Av Peña MD;  Location:  OR     LAPAROSCOPIC CHOLECYSTECTOMY N/A 10/19/2017    Procedure: LAPAROSCOPIC CHOLECYSTECTOMY;  LAPAROSCOPIC CHOLECYSTECTOMY;  Surgeon: Ney Jerry MD;  Location:  OR     THORACOSCOPY  4/26/2013    Procedure: THORACOSCOPY;  LEFT VIDEO ASSISTED THORACOSCOPY, RESECTION OF POSTERIOR MEDIASTINAL MASS;  Surgeon: Av Peña MD;  Location:  OR     TONSILLECTOMY  as a kid     TONSILLECTOMY       Current Outpatient  Medications   Medication Sig Dispense Refill     acetaminophen (TYLENOL) 325 MG tablet Take 3 tablets (975 mg) by mouth every 6 hours as needed for pain 50 tablet 0     albuterol (PROAIR HFA/PROVENTIL HFA/VENTOLIN HFA) 108 (90 Base) MCG/ACT inhaler Inhale 2 puffs into the lungs every 6 hours 3 Inhaler 3     albuterol (PROAIR HFA/PROVENTIL HFA/VENTOLIN HFA) 108 (90 Base) MCG/ACT inhaler Inhale 2 puffs into the lungs every 6 hours as needed for shortness of breath / dyspnea 18 g 3     albuterol (PROVENTIL) (2.5 MG/3ML) 0.083% neb solution Take 1 vial (2.5 mg) by nebulization every 6 hours as needed for shortness of breath / dyspnea or wheezing 25 vial 3     amLODIPine (NORVASC) 5 MG tablet Take 1 tablet (5 mg) by mouth daily 90 tablet 0     atorvastatin (LIPITOR) 80 MG tablet Take 1 tablet (80 mg) by mouth daily 90 tablet 0     benzoyl peroxide 10 % EX external gel Apply topically 2 times daily 90 g 3     blood glucose (ACCU-CHEK ALLYSON PLUS) test strip USE TO TEST BLOOD SUGAR ONE TIME DAILY OR AS DIRECTED 100 each 0     blood glucose (NO BRAND SPECIFIED) lancets standard Use to test blood sugar 1 times daily or as directed. 100 each 3     blood glucose (NO BRAND SPECIFIED) lancets standard Use to test blood sugar 1 time daily 100 each 1     blood glucose (NO BRAND SPECIFIED) test strip Use to test blood sugar 1 times daily or as directed.  Dispense Accu-chek Allyson Plus or per patient insurance 100 strip 3     blood glucose monitoring (NO BRAND SPECIFIED) meter device kit Use to test blood sugar 1 times daily or as directed.  Dispense Accu-chek Allyson Plus or per insurance preference 1 kit 0     blood glucose monitoring (NO BRAND SPECIFIED) meter device kit Use to test blood sugar 1 times daily or as directed. 1 kit 0     buPROPion (WELLBUTRIN XL) 150 MG 24 hr tablet TAKE ONE TABLET BY MOUTH EVERY MORNING 90 tablet 0     cetirizine HCl 10 MG CAPS Take 1 capsule (10 mg) by mouth daily as needed Takes in the spring. 90  capsule 3     clindamycin 1 % EX external gel Apply topically 2 times daily 60 g 3     clonazePAM (KLONOPIN) 1 MG tablet TAKE ONE TABLET BY MOUTH TWICE A DAY AS NEEDED FOR ANXIETY       cyclobenzaprine (FLEXERIL) 5 MG tablet Take 1 tablet (5 mg) by mouth 3 times daily as needed for muscle spasms 30 tablet 1     desonide (DESOWEN) 0.05 % external cream Apply topically as needed (rash) 60 g 0     fluticasone 50 MCG/ACT NA nasal spray Spray 2 sprays in nostril daily 16 g 5     Fluticasone-Umeclidin-Vilanterol (TRELEGY ELLIPTA) 100-62.5-25 MCG/INH oral inhaler Inhale 1 puff into the lungs daily       furosemide (LASIX) 20 MG tablet Take 1 tablet (20 mg) by mouth daily 90 tablet 0     glipiZIDE (GLUCOTROL XL) 2.5 MG 24 hr tablet TAKE ONE TABLET BY MOUTH EVERY DAY 30 tablet 1     hydrOXYzine (ATARAX) 50 MG tablet TAKE TWO TABLETS BY MOUTH THREE TIMES A  tablet 0     ibuprofen (ADVIL/MOTRIN) 600 MG tablet Take 1 tablet (600 mg) by mouth every 6 hours as needed for pain 30 tablet 0     lamoTRIgine (LAMICTAL) 100 MG tablet 100 mg daily        lisinopril (ZESTRIL) 20 MG tablet Take 1 tablet (20 mg) by mouth daily 90 tablet 0     metFORMIN (GLUCOPHAGE) 500 MG tablet Take 1 tablet (500 mg) by mouth daily (with breakfast) 90 tablet 0     montelukast (SINGULAIR) 10 MG tablet Take 1 tablet (10 mg) by mouth At Bedtime 90 tablet 4     nicotine (NICORETTE) 2 MG gum Place 1 each (2 mg) inside cheek as needed for smoking cessation 150 each 1     nicotine 14 MG/24HR TD 24 hr patch Place 1 patch onto the skin every 24 hours 30 patch 1     nitroGLYcerin (NITROSTAT) 0.4 MG sublingual tablet FOR CHEST PAIN PLACE ONE TABLET UNDER THE TONGUE EVERY 5 MINUTES FOR 3 DOSES.  IF SYPTOMS PERSIST 5 MINUTES AFTER 1ST DOSE CALL 911 25 tablet 0     nystatin 014095 UNIT/GM EX external powder Apply topically 2 times daily as needed (skin rash) 60 g 11     omeprazole (PRILOSEC) 20 MG DR capsule Take 1 capsule (20 mg) by mouth 2 times daily 180  capsule 1     ondansetron (ZOFRAN) 8 MG tablet TAKE ONE-HALF TO ONE TABLETS BY MOUTH EVERY 8 HOURS AS NEEDED FOR NAUSEA 30 tablet 1     order for DME Please provide oximeter 1 Device 0     order for DME Equipment being ordered: Digital home blood pressure monitor kit 1 Device 0     order for DME Equipment being ordered: Pulse Oxymeter 1 Device 0     pregabalin (LYRICA) 50 MG capsule Take 1 capsule (50 mg) by mouth 3 times daily 90 capsule 1     sulfamethoxazole-trimethoprim (BACTRIM DS) 800-160 MG tablet Take 1 tablet by mouth 2 times daily Take one tablet twice daily. 60 tablet 3     tiotropium (SPIRIVA HANDIHALER) 18 MCG IN inhaled capsule Inhale contents of one capsule daily. (Patient taking differently: Inhale contents of one capsule daily PRN) 90 capsule 3     VENTOLIN  (90 Base) MCG/ACT inhaler Inhale 2 puffs into the lungs every 6 hours 1 Inhaler 1     OTC products: None, except as noted above    Allergies   Allergen Reactions     Codeine Nausea and Vomiting     vomiting     Cyclobenzaprine Nausea     Hydrocodone Nausea and Vomiting     Sulfa Drugs Nausea and Vomiting     Nausea     Gabapentin Rash      Latex Allergy: NO    Social History     Tobacco Use     Smoking status: Current Every Day Smoker     Packs/day: 0.50     Years: 30.00     Pack years: 15.00     Types: Cigarettes     Start date: 1/1/1985     Smokeless tobacco: Never Used     Tobacco comment: Is trying to quit, using nicotine gum and patch   Substance Use Topics     Alcohol use: No     Alcohol/week: 0.0 standard drinks     History   Drug Use No       REVIEW OF SYSTEMS:   CONSTITUTIONAL: NEGATIVE for fever, chills, change in weight  INTEGUMENTARY/SKIN: NEGATIVE for worrisome rashes, moles or lesions  EYES: NEGATIVE for vision changes or irritation  ENT/MOUTH: NEGATIVE for ear, mouth and throat problems  RESP: NEGATIVE for significant cough or SOB  CV: NEGATIVE for chest pain, palpitations or peripheral edema  GI: NEGATIVE for nausea,  abdominal pain, heartburn, or change in bowel habits  : NEGATIVE for frequency, dysuria, or hematuria  MUSCULOSKELETAL: NEGATIVE for significant arthralgias or myalgia  NEURO: NEGATIVE for weakness, dizziness or paresthesias  ENDOCRINE: NEGATIVE for temperature intolerance, skin/hair changes  PSYCHIATRIC: NEGATIVE for changes in mood or affect    EXAM:   BP 98/64   Pulse 85   Temp 98.4  F (36.9  C)   Resp 16   Wt 102.5 kg (226 lb)   LMP 04/15/2016 (Exact Date)   SpO2 98%   Breastfeeding No   BMI 38.79 kg/m      GENERAL APPEARANCE: healthy, alert and no distress     EYES: EOMI, PERRL     HENT: ear canals and TM's normal and nose and mouth without ulcers or lesions     NECK: no adenopathy, no asymmetry, masses, or scars and thyroid normal to palpation     RESP: lungs clear to auscultation - no rales, rhonchi or wheezes     CV: regular rates and rhythm, normal S1 S2, no S3 or S4 and no murmur, click or rub     ABDOMEN:  soft, nontender, no HSM or masses and bowel sounds normal     MS: extremities normal- no gross deformities noted, no evidence of inflammation in joints, FROM in all extremities.     NEURO: Normal strength and tone, sensory exam grossly normal, mentation intact and speech normal     PSYCH: mentation appears normal. and affect normal/bright     LYMPHATICS: No cervical adenopathy    DIAGNOSTICS:   EKG: sinus rhythm  normal axis, poor R wave progression     Hemoglobin  12.6  Serum Potassium 4.8  Hemoglobin A1C 5.6    Recent Labs   Lab Test 09/29/20  1527 05/18/20  1819 02/20/20  0920 05/10/19  1139 05/10/19  1139 03/27/17  1131 03/27/17  1131   HGB 12.3 13.8 14.5   < > 14.4   < > 14.2    403 386   < > 353   < > 320   INR 0.94  --   --   --   --   --  0.90    141 141   < > 140   < > 140   POTASSIUM 4.6 4.0 4.3   < > 4.2   < > 4.4   CR 1.07* 0.76 0.71   < > 0.88   < > 0.70   A1C  --   --  6.0*  --  5.6  --   --     < > = values in this interval not displayed.      IMPRESSION:       (Z01.818) Preop general physical exam  (primary encounter diagnosis)  Comment: Colonoscopy for rectal bleed  Plan: EKG 12-lead complete w/read - Clinics,         Hemoglobin, Potassium        (R73.03) Prediabetes  Plan: Hemoglobin A1c            (K62.5) Rectal bleedin  Plan: colonoscopy    (I10) HTN, goal below 140/90  Plan: BP low, advised to stop amlodipine and hold lisinopril if SBP < 100  Written instructions given, continue to monitor BP and call if SBP < 100 or > 140    (J44.9) COPD, moderate (H)  Plan: stable        The proposed surgical procedure is considered LOW risk.    REVISED CARDIAC RISK INDEX  The patient has the following serious cardiovascular risks for perioperative complications such as (MI, PE, VFib and 3  AV Block):  No serious cardiac risks    The patient has the following additional risks for perioperative complications:  Morbid obesity      ICD-10-CM    1. Preop general physical exam  Z01.818 EKG 12-lead complete w/read - Clinics     Hemoglobin     Potassium   2. Prediabetes  R73.03 Hemoglobin A1c   3. Rectal bleeding  K62.5    4. HTN, goal below 140/90  I10    5. COPD, moderate (H)  J44.9        RECOMMENDATIONS:         Diabetes Medication Use  Hold metformin, glipizide, lisinopril and amlodipine on the morning of colonoscopy      Check with psychiatrist about psychiatric medications before procedure.       Anticoagulant or Antiplatelet Medication Use  ASPIRIN: Discontinue ASA 7  days prior to procedure to reduce bleeding risk.     NSAIDS:   Stop  3 days prior to surgery          ACE Inhibitor or Angiotensin Receptor Blocker (ARB) Use  Ace inhibitor or Angiotensin Receptor Blocker (ARB) and should HOLD this medication for the 24 hours prior to surgery.      APPROVAL GIVEN to proceed with proposed procedure, without further diagnostic evaluation       Signed Electronically by: Christen Regan MD    Copy of this evaluation report is provided to requesting physician.    Svetlana  Preop Guidelines    Revised Cardiac Risk Index

## 2020-10-17 LAB — POTASSIUM SERPL-SCNC: 4.8 MMOL/L (ref 3.4–5.3)

## 2020-10-18 DIAGNOSIS — Z20.822 ENCOUNTER FOR LABORATORY TESTING FOR COVID-19 VIRUS: ICD-10-CM

## 2020-10-18 DIAGNOSIS — Z11.59 ENCOUNTER FOR SCREENING FOR OTHER VIRAL DISEASES: ICD-10-CM

## 2020-10-18 PROCEDURE — U0003 INFECTIOUS AGENT DETECTION BY NUCLEIC ACID (DNA OR RNA); SEVERE ACUTE RESPIRATORY SYNDROME CORONAVIRUS 2 (SARS-COV-2) (CORONAVIRUS DISEASE [COVID-19]), AMPLIFIED PROBE TECHNIQUE, MAKING USE OF HIGH THROUGHPUT TECHNOLOGIES AS DESCRIBED BY CMS-2020-01-R: HCPCS | Performed by: INTERNAL MEDICINE

## 2020-10-18 RX ORDER — HYDROXYZINE HYDROCHLORIDE 50 MG/1
TABLET, FILM COATED ORAL
Qty: 180 TABLET | Refills: 0 | Status: SHIPPED | OUTPATIENT
Start: 2020-10-18 | End: 2020-11-05

## 2020-10-19 ENCOUNTER — DOCUMENTATION ONLY (OUTPATIENT)
Dept: CARE COORDINATION | Facility: CLINIC | Age: 50
End: 2020-10-19

## 2020-10-19 LAB
SARS-COV-2 RNA SPEC QL NAA+PROBE: NOT DETECTED
SPECIMEN SOURCE: NORMAL

## 2020-10-21 ENCOUNTER — TELEPHONE (OUTPATIENT)
Dept: INTERNAL MEDICINE | Facility: CLINIC | Age: 50
End: 2020-10-21

## 2020-10-21 NOTE — TELEPHONE ENCOUNTER
Patient is having surgery tomorrow (10/22/20) at [facility] and they are not able to access the EKG done on 10/16/20 in her chart. They need a copy of it faxed to them at [phone] ASAP, or call Aleksandar at [phone]

## 2020-10-22 ENCOUNTER — HOSPITAL ENCOUNTER (OUTPATIENT)
Facility: CLINIC | Age: 50
Discharge: HOME | End: 2020-10-22
Attending: INTERNAL MEDICINE | Admitting: INTERNAL MEDICINE
Payer: COMMERCIAL

## 2020-10-22 ENCOUNTER — ANESTHESIA (OUTPATIENT)
Dept: SURGERY | Facility: CLINIC | Age: 50
End: 2020-10-22
Payer: COMMERCIAL

## 2020-10-22 ENCOUNTER — ANESTHESIA EVENT (OUTPATIENT)
Dept: SURGERY | Facility: CLINIC | Age: 50
End: 2020-10-22
Payer: COMMERCIAL

## 2020-10-22 VITALS
OXYGEN SATURATION: 97 % | HEIGHT: 64 IN | WEIGHT: 226 LBS | BODY MASS INDEX: 38.58 KG/M2 | SYSTOLIC BLOOD PRESSURE: 138 MMHG | TEMPERATURE: 97.8 F | RESPIRATION RATE: 18 BRPM | HEART RATE: 76 BPM | DIASTOLIC BLOOD PRESSURE: 84 MMHG

## 2020-10-22 DIAGNOSIS — K21.9 GASTROESOPHAGEAL REFLUX DISEASE WITHOUT ESOPHAGITIS: Primary | Chronic | ICD-10-CM

## 2020-10-22 LAB
COLONOSCOPY: NORMAL
GLUCOSE BLDC GLUCOMTR-MCNC: 121 MG/DL (ref 70–99)
UPPER GI ENDOSCOPY: NORMAL

## 2020-10-22 PROCEDURE — 93010 ELECTROCARDIOGRAM REPORT: CPT | Performed by: INTERNAL MEDICINE

## 2020-10-22 PROCEDURE — 88305 TISSUE EXAM BY PATHOLOGIST: CPT | Mod: TC | Performed by: INTERNAL MEDICINE

## 2020-10-22 PROCEDURE — 88305 TISSUE EXAM BY PATHOLOGIST: CPT | Mod: 26

## 2020-10-22 PROCEDURE — 370N000002 HC ANESTHESIA TECHNICAL FEE, EACH ADDTL 15 MIN: Performed by: INTERNAL MEDICINE

## 2020-10-22 PROCEDURE — 258N000003 HC RX IP 258 OP 636: Performed by: ANESTHESIOLOGY

## 2020-10-22 PROCEDURE — 360N000015 HC SURGERY LEVEL 2 EA 15 ADDTL MIN: Performed by: INTERNAL MEDICINE

## 2020-10-22 PROCEDURE — 250N000011 HC RX IP 250 OP 636: Performed by: ANESTHESIOLOGY

## 2020-10-22 PROCEDURE — 250N000009 HC RX 250: Performed by: NURSE ANESTHETIST, CERTIFIED REGISTERED

## 2020-10-22 PROCEDURE — 999N000036 HC STATISTIC COLONOSCOPY (OR PROCEDURE): Performed by: INTERNAL MEDICINE

## 2020-10-22 PROCEDURE — 370N000001 HC ANESTHESIA TECHNICAL FEE, 1ST 30 MIN: Performed by: INTERNAL MEDICINE

## 2020-10-22 PROCEDURE — 250N000011 HC RX IP 250 OP 636: Performed by: NURSE ANESTHETIST, CERTIFIED REGISTERED

## 2020-10-22 PROCEDURE — 761N000007 HC RECOVERY PHASE 2 EACH 15 MINS: Performed by: INTERNAL MEDICINE

## 2020-10-22 PROCEDURE — 999N001017 HC STATISTIC GLUCOSE BY METER IP

## 2020-10-22 PROCEDURE — 999N000053 HC STATISTIC EGD (OR PROCEDURE): Performed by: INTERNAL MEDICINE

## 2020-10-22 PROCEDURE — 999N000136 HC STATISTIC PRE PROC ASSESS II: Performed by: INTERNAL MEDICINE

## 2020-10-22 PROCEDURE — 360N000014 HC SURGERY LEVEL 2 1ST 30 MIN: Performed by: INTERNAL MEDICINE

## 2020-10-22 RX ORDER — KETOROLAC TROMETHAMINE 30 MG/ML
30 INJECTION, SOLUTION INTRAMUSCULAR; INTRAVENOUS EVERY 6 HOURS PRN
Status: DISCONTINUED | OUTPATIENT
Start: 2020-10-22 | End: 2020-10-22 | Stop reason: HOSPADM

## 2020-10-22 RX ORDER — PROCHLORPERAZINE MALEATE 10 MG
10 TABLET ORAL EVERY 6 HOURS PRN
Status: DISCONTINUED | OUTPATIENT
Start: 2020-10-22 | End: 2020-10-22 | Stop reason: HOSPADM

## 2020-10-22 RX ORDER — ONDANSETRON 4 MG/1
4 TABLET, ORALLY DISINTEGRATING ORAL EVERY 6 HOURS PRN
Status: DISCONTINUED | OUTPATIENT
Start: 2020-10-22 | End: 2020-10-22 | Stop reason: HOSPADM

## 2020-10-22 RX ORDER — SODIUM CHLORIDE, SODIUM LACTATE, POTASSIUM CHLORIDE, CALCIUM CHLORIDE 600; 310; 30; 20 MG/100ML; MG/100ML; MG/100ML; MG/100ML
INJECTION, SOLUTION INTRAVENOUS CONTINUOUS
Status: DISCONTINUED | OUTPATIENT
Start: 2020-10-22 | End: 2020-10-22 | Stop reason: HOSPADM

## 2020-10-22 RX ORDER — FENTANYL CITRATE 50 UG/ML
25-50 INJECTION, SOLUTION INTRAMUSCULAR; INTRAVENOUS
Status: DISCONTINUED | OUTPATIENT
Start: 2020-10-22 | End: 2020-10-22 | Stop reason: HOSPADM

## 2020-10-22 RX ORDER — METOPROLOL TARTRATE 1 MG/ML
1-2 INJECTION, SOLUTION INTRAVENOUS EVERY 5 MIN PRN
Status: DISCONTINUED | OUTPATIENT
Start: 2020-10-22 | End: 2020-10-22 | Stop reason: HOSPADM

## 2020-10-22 RX ORDER — FLUMAZENIL 0.1 MG/ML
0.2 INJECTION, SOLUTION INTRAVENOUS
Status: DISCONTINUED | OUTPATIENT
Start: 2020-10-22 | End: 2020-10-22 | Stop reason: HOSPADM

## 2020-10-22 RX ORDER — HYDRALAZINE HYDROCHLORIDE 20 MG/ML
2.5-5 INJECTION INTRAMUSCULAR; INTRAVENOUS EVERY 10 MIN PRN
Status: DISCONTINUED | OUTPATIENT
Start: 2020-10-22 | End: 2020-10-22 | Stop reason: HOSPADM

## 2020-10-22 RX ORDER — DIAZEPAM 10 MG/2ML
2.5 INJECTION, SOLUTION INTRAMUSCULAR; INTRAVENOUS ONCE
Status: COMPLETED | OUTPATIENT
Start: 2020-10-22 | End: 2020-10-22

## 2020-10-22 RX ORDER — ONDANSETRON 2 MG/ML
4 INJECTION INTRAMUSCULAR; INTRAVENOUS
Status: DISCONTINUED | OUTPATIENT
Start: 2020-10-22 | End: 2020-10-22 | Stop reason: HOSPADM

## 2020-10-22 RX ORDER — FENTANYL CITRATE 50 UG/ML
INJECTION, SOLUTION INTRAMUSCULAR; INTRAVENOUS PRN
Status: DISCONTINUED | OUTPATIENT
Start: 2020-10-22 | End: 2020-10-22

## 2020-10-22 RX ORDER — HYDROMORPHONE HYDROCHLORIDE 1 MG/ML
.3-.5 INJECTION, SOLUTION INTRAMUSCULAR; INTRAVENOUS; SUBCUTANEOUS EVERY 10 MIN PRN
Status: DISCONTINUED | OUTPATIENT
Start: 2020-10-22 | End: 2020-10-22 | Stop reason: HOSPADM

## 2020-10-22 RX ORDER — LIDOCAINE 40 MG/G
CREAM TOPICAL
Status: DISCONTINUED | OUTPATIENT
Start: 2020-10-22 | End: 2020-10-22 | Stop reason: HOSPADM

## 2020-10-22 RX ORDER — ONDANSETRON 2 MG/ML
4 INJECTION INTRAMUSCULAR; INTRAVENOUS EVERY 6 HOURS PRN
Status: DISCONTINUED | OUTPATIENT
Start: 2020-10-22 | End: 2020-10-22 | Stop reason: HOSPADM

## 2020-10-22 RX ORDER — FENTANYL CITRATE 50 UG/ML
25-50 INJECTION, SOLUTION INTRAMUSCULAR; INTRAVENOUS EVERY 5 MIN PRN
Status: DISCONTINUED | OUTPATIENT
Start: 2020-10-22 | End: 2020-10-22 | Stop reason: HOSPADM

## 2020-10-22 RX ORDER — ONDANSETRON 2 MG/ML
INJECTION INTRAMUSCULAR; INTRAVENOUS PRN
Status: DISCONTINUED | OUTPATIENT
Start: 2020-10-22 | End: 2020-10-22

## 2020-10-22 RX ORDER — GLYCOPYRROLATE 0.2 MG/ML
INJECTION, SOLUTION INTRAMUSCULAR; INTRAVENOUS PRN
Status: DISCONTINUED | OUTPATIENT
Start: 2020-10-22 | End: 2020-10-22

## 2020-10-22 RX ORDER — KETAMINE HYDROCHLORIDE 10 MG/ML
INJECTION INTRAMUSCULAR; INTRAVENOUS PRN
Status: DISCONTINUED | OUTPATIENT
Start: 2020-10-22 | End: 2020-10-22

## 2020-10-22 RX ORDER — NALOXONE HYDROCHLORIDE 0.4 MG/ML
.1-.4 INJECTION, SOLUTION INTRAMUSCULAR; INTRAVENOUS; SUBCUTANEOUS
Status: DISCONTINUED | OUTPATIENT
Start: 2020-10-22 | End: 2020-10-22 | Stop reason: HOSPADM

## 2020-10-22 RX ORDER — PROPOFOL 10 MG/ML
INJECTION, EMULSION INTRAVENOUS CONTINUOUS PRN
Status: DISCONTINUED | OUTPATIENT
Start: 2020-10-22 | End: 2020-10-22

## 2020-10-22 RX ORDER — ONDANSETRON 4 MG/1
4 TABLET, ORALLY DISINTEGRATING ORAL EVERY 30 MIN PRN
Status: DISCONTINUED | OUTPATIENT
Start: 2020-10-22 | End: 2020-10-22 | Stop reason: HOSPADM

## 2020-10-22 RX ORDER — ALBUTEROL SULFATE 0.83 MG/ML
2.5 SOLUTION RESPIRATORY (INHALATION) EVERY 4 HOURS PRN
Status: DISCONTINUED | OUTPATIENT
Start: 2020-10-22 | End: 2020-10-22 | Stop reason: HOSPADM

## 2020-10-22 RX ORDER — OXYCODONE HYDROCHLORIDE 5 MG/1
5 TABLET ORAL EVERY 4 HOURS PRN
Status: DISCONTINUED | OUTPATIENT
Start: 2020-10-22 | End: 2020-10-22 | Stop reason: HOSPADM

## 2020-10-22 RX ORDER — MEPERIDINE HYDROCHLORIDE 25 MG/ML
12.5 INJECTION INTRAMUSCULAR; INTRAVENOUS; SUBCUTANEOUS
Status: DISCONTINUED | OUTPATIENT
Start: 2020-10-22 | End: 2020-10-22 | Stop reason: HOSPADM

## 2020-10-22 RX ORDER — PROPOFOL 10 MG/ML
INJECTION, EMULSION INTRAVENOUS PRN
Status: DISCONTINUED | OUTPATIENT
Start: 2020-10-22 | End: 2020-10-22

## 2020-10-22 RX ORDER — ONDANSETRON 2 MG/ML
4 INJECTION INTRAMUSCULAR; INTRAVENOUS EVERY 30 MIN PRN
Status: DISCONTINUED | OUTPATIENT
Start: 2020-10-22 | End: 2020-10-22 | Stop reason: HOSPADM

## 2020-10-22 RX ADMIN — ONDANSETRON HYDROCHLORIDE 4 MG: 2 INJECTION, SOLUTION INTRAVENOUS at 14:14

## 2020-10-22 RX ADMIN — FENTANYL CITRATE 50 MCG: 50 INJECTION, SOLUTION INTRAMUSCULAR; INTRAVENOUS at 14:22

## 2020-10-22 RX ADMIN — PROPOFOL 50 MG: 10 INJECTION, EMULSION INTRAVENOUS at 14:10

## 2020-10-22 RX ADMIN — PROPOFOL 30 MG: 10 INJECTION, EMULSION INTRAVENOUS at 14:25

## 2020-10-22 RX ADMIN — PROPOFOL 75 MCG/KG/MIN: 10 INJECTION, EMULSION INTRAVENOUS at 14:11

## 2020-10-22 RX ADMIN — SODIUM CHLORIDE, POTASSIUM CHLORIDE, SODIUM LACTATE AND CALCIUM CHLORIDE: 600; 310; 30; 20 INJECTION, SOLUTION INTRAVENOUS at 13:57

## 2020-10-22 RX ADMIN — PROPOFOL 30 MG: 10 INJECTION, EMULSION INTRAVENOUS at 14:41

## 2020-10-22 RX ADMIN — KETOROLAC TROMETHAMINE 30 MG: 30 INJECTION, SOLUTION INTRAMUSCULAR at 15:19

## 2020-10-22 RX ADMIN — Medication 10 MG: at 14:30

## 2020-10-22 RX ADMIN — Medication 10 MG: at 14:24

## 2020-10-22 RX ADMIN — ONDANSETRON 4 MG: 4 TABLET, ORALLY DISINTEGRATING ORAL at 16:47

## 2020-10-22 RX ADMIN — Medication 5 MG: at 14:04

## 2020-10-22 RX ADMIN — GLYCOPYRROLATE 0.1 MG: 0.2 INJECTION, SOLUTION INTRAMUSCULAR; INTRAVENOUS at 14:00

## 2020-10-22 RX ADMIN — DIAZEPAM 2.5 MG: 10 INJECTION, SOLUTION INTRAMUSCULAR; INTRAVENOUS at 15:44

## 2020-10-22 RX ADMIN — FENTANYL CITRATE 50 MCG: 50 INJECTION, SOLUTION INTRAMUSCULAR; INTRAVENOUS at 14:01

## 2020-10-22 RX ADMIN — PROPOFOL 50 MG: 10 INJECTION, EMULSION INTRAVENOUS at 14:05

## 2020-10-22 RX ADMIN — FENTANYL CITRATE 50 MCG: 0.05 INJECTION, SOLUTION INTRAMUSCULAR; INTRAVENOUS at 15:01

## 2020-10-22 RX ADMIN — FENTANYL CITRATE 50 MCG: 0.05 INJECTION, SOLUTION INTRAMUSCULAR; INTRAVENOUS at 14:56

## 2020-10-22 RX ADMIN — PROPOFOL 50 MG: 10 INJECTION, EMULSION INTRAVENOUS at 14:08

## 2020-10-22 RX ADMIN — PROPOFOL 50 MG: 10 INJECTION, EMULSION INTRAVENOUS at 14:04

## 2020-10-22 RX ADMIN — MIDAZOLAM 2 MG: 1 INJECTION INTRAMUSCULAR; INTRAVENOUS at 13:57

## 2020-10-22 RX ADMIN — Medication 15 MG: at 14:01

## 2020-10-22 ASSESSMENT — LIFESTYLE VARIABLES: TOBACCO_USE: 1

## 2020-10-22 ASSESSMENT — MIFFLIN-ST. JEOR: SCORE: 1625.13

## 2020-10-22 ASSESSMENT — COPD QUESTIONNAIRES: COPD: 1

## 2020-10-22 NOTE — DISCHARGE INSTRUCTIONS
GENERAL ANESTHESIA OR SEDATION ADULT DISCHARGE INSTRUCTIONS   SPECIAL PRECAUTIONS FOR 24 HOURS AFTER SURGERY    IT IS NOT UNUSUAL TO FEEL LIGHT-HEADED OR FAINT, UP TO 24 HOURS AFTER SURGERY OR WHILE TAKING PAIN MEDICATION.  IF YOU HAVE THESE SYMPTOMS; SIT FOR A FEW MINUTES BEFORE STANDING AND HAVE SOMEONE ASSIST YOU WHEN YOU GET UP TO WALK OR USE THE BATHROOM.    YOU SHOULD REST AND RELAX FOR THE NEXT 24 HOURS AND YOU MUST MAKE ARRANGEMENTS TO HAVE SOMEONE STAY WITH YOU FOR AT LEAST 24 HOURS AFTER YOUR DISCHARGE.  AVOID HAZARDOUS AND STRENUOUS ACTIVITIES.  DO NOT MAKE IMPORTANT DECISIONS FOR 24 HOURS.    DO NOT DRIVE ANY VEHICLE OR OPERATE MECHANICAL EQUIPMENT FOR 24 HOURS FOLLOWING THE END OF YOUR SURGERY.  EVEN THOUGH YOU MAY FEEL NORMAL, YOUR REACTIONS MAY BE AFFECTED BY THE MEDICATION YOU HAVE RECEIVED.    DO NOT DRINK ALCOHOLIC BEVERAGES FOR 24 HOURS FOLLOWING YOUR SURGERY.    DRINK CLEAR LIQUIDS (APPLE JUICE, GINGER ALE, 7-UP, BROTH, ETC.).  PROGRESS TO YOUR REGULAR DIET AS YOU FEEL ABLE.    YOU MAY HAVE A DRY MOUTH, A SORE THROAT, MUSCLES ACHES OR TROUBLE SLEEPING.  THESE SHOULD GO AWAY AFTER 24 HOURS.    CALL YOUR DOCTOR FOR ANY OF THE FOLLOWING:  SIGNS OF INFECTION (FEVER, GROWING TENDERNESS AT THE SURGERY SITE, A LARGE AMOUNT OF DRAINAGE OR BLEEDING, SEVERE PAIN, FOUL-SMELLING DRAINAGE, REDNESS OR SWELLING.    IT HAS BEEN OVER 8 TO 10 HOURS SINCE SURGERY AND YOU ARE STILL NOT ABLE TO URINATE (PASS WATER).     ESOPHAGOGASTRODUODENOSCOPY DISCHARGE INSTRUCTIONS    You may not drive, use heavy equipment or consume alcohol for 24 hours because the drugs you were given may cause dizziness, drowsiness, forgetfulness and slower reaction time.    Small pieces or tissue (biopsies) or polyps may have been removed.    You may resume your regular diet and medications. Exception: If you had a biopsy or polypectomy, do not take aspirin, aleve (naproxen) or ibuprofen for the next 10 days.  Tylenol (acetaminophen) is safe  to take.    Additional instructions:  If you had a biopsy or polypectomy, the pathology report will be sent to your doctor.  If you have not received the results within 10 days, call your doctor's office.    What to watch for:  Problems rarely occur after the procedure.  It is important for you to be aware of the early signs of a possible complication.  Call immediately if you notice any of the followin.  Unusual pain or difficulty swallowing.    2.  Unusual abdominal or chest pain.    3.  Vomiting of blood.    4.  Black or bloody stools.    5.  Temperature above 100.6 degrees F         DR. YOSELYN TATE M.D.     CLINIC PHONE NUMBER:  [phone]    You received Toradol, an IV form of ibuprofen (Motrin) at 3:45.  Do not take any ibuprofen products until 9:45.

## 2020-10-22 NOTE — LETTER
October 9, 2020      Swetha Patterson  [address]  [place] [address]        Dear Swetha,     Thank you for choosing [facility]. You are scheduled for the following service(s).   Please be aware that coverage of these services is subject to the terms and limitations of your health insurance plan.  Call member services at your health plan with any benefit or coverage questions.    You will need to have a History and Physical Exam done within 30 days of this scheduled procedure. Please arrange this with your primary care physician.    Date:  10/22/2020    Procedure:   COLONOSCOPY    Doctor:  Dr. Marshall Mccormick                      Arrival Time:  12:30pm   *check in at the Surgery Center desk*     Procedure Time: 2:00pm      Location:   [facility]      Surgery Center (on the south side of the [facility])       [address] [place], [place] [address]      MIRALAX -GATORADE  PREP  Colonoscopy is the most accurate test to detect colon polyps and colon cancer; and the only test where polyps can be removed. During this procedure, a doctor examines the lining of your large intestine and rectum through a flexible tube.  Transportation  You must arrange for a ride for the day of your procedure with a responsible adult. A taxi , Uber, etc, is not an option unless you are accompanied by a responsible adult. If you fail to arrange transportation with a responsible adult, your procedure will be cancelled and rescheduled.    Purchase the  following supplies at your local pharmacy:  - 2 (two) bisacodyl tablets: each tablet contains 5 mg.  (Dulcolax  laxative NOT Dulcolax  stool softener)   - 1 (one) 8.3 oz bottle of Polyethylene Glycol (PEG) 3350 Powder   (MiraLAX , Smooth LAX , ClearLAX  or equivalent)  - 64 oz Gatorade    Regular Gatorade, Gatorade G2 , Powerade , Powerade Zero  or Pedialyte  is acceptable. Red colored flavors are not allowed; all other colors (yellow,  green, orange, purple and blue) are okay. It is also okay to buy two 2.12 oz packets of powdered Gatorade that can be mixed with water to a total volume of 64 oz of liquid.  - 1 (one) 10 oz bottle of Magnesium Citrate (Red colored flavors are not allowed)  It is also okay for you to use a 0.5 oz package of powdered magnesium citrate (17 g) mixed with 10 oz of water.    PREPARATION FOR COLONOSCOPY    7 days before:    Discontinue fiber supplements and medications containing iron. This includes Metamucil  and Fibercon ; and multivitamins with iron.  3 days before:    Begin a low-fiber diet. A low-fiber diet helps making the cleanout more effective.     Examples of a low-fiber diet include (but are not limited to): white bread, white rice, pasta, crackers, fish, chicken, eggs, ground beef, creamy peanut butter, cooked/steamed/boiled vegetables, canned fruit, bananas, melons, milk, plain yogurt cheese, salad dressing and other condiments.     The following are not allowed on a low-fiber diet: seeds, nuts, popcorn, bran, whole wheat, corn, quinoa, raw fruits and vegetables, berries and dried fruit, beans and lentils.    For additional details on low-fiber diet, please refer to the table on the last page.  2 days before:    Continue the low-fiber diet.     Drink at least 8 glasses of water throughout the day.     Stop eating solid foods at 11:45 pm.  1 day before:    In the morning: begin a clear liquid diet (liquids you can see through).     Examples of a clear liquid diet include: water, clear broth or bouillon, Gatorade, Pedialyte or Powerade, carbonated and non-carbonated soft drinks (Sprite , 7-Up , ginger ale), strained fruit juices without pulp (apple, white grape, white cranberry), Jell-O  and popsicles.     The following are not allowed on a clear liquid diet: red liquids, alcoholic beverages,  dairy products (milk, creamer, and yogurt), protein shakes,  juice with pulp and chewing tobacco.    At noon: take 2  (two) bisacodyl tablets     At 4 (and no later than 6pm): start drinking the Miralax-Gatorade preparation (8.3 oz of Miralax mixed with 64 oz of Gatorade in a large pitcher). Drink 1(one) 8 oz glass every 15 minutes thereafter, until the mixture is gone.    COLON CLEANSING TIPS: drink adequate amounts of fluids before and after your colon cleansing to prevent dehydration. Stay near a toilet because you will have diarrhea. Even if you are sitting on the toilet, continue to drink the cleansing solution every 15 minutes. If you feel nauseous or vomit, rinse your mouth with water, take a 15 to 30-minute-break and then continue drinking the solution. You will be uncomfortable until the stool has flushed from your colon (in about 2 to 4 hours). You may feel chilled.    Day of your procedure  You may take all of your morning medications including blood pressure medications, blood thinners (if you have not been instructed to stop these by our office), methadone, anti-seizure medications with sips of water 3 hours prior to your procedure or earlier. Do not take insulin or vitamins prior to your procedure. Continue the clear liquid diet.   4 hours prior: drink 10 oz of magnesium citrate. It may be easier to drink it with a straw.    STOP consuming all liquids after that.     Do not take anything by mouth during this time.     Allow extra time to travel to your procedure as you may need to stop and use a restroom along the way.  You are ready for the procedure, if you followed all instructions and your stool is no longer formed, but clear or yellow liquid. If you are unsure whether your colon is clean, please call our office at [phone] before you leave for your appointment.    Bring the following to your procedure:  - Insurance Card/Photo ID.   - List of current medications including over-the-counter medications and supplements.   - Your rescue inhaler if you currently use one to control asthma.    Canceling or  rescheduling your appointment:   If you must cancel or reschedule your appointment, please call [phone] as soon as possible.    COLONOSCOPY PRE-PROCEDURE CHECKLIST  If you have diabetes, ask your regular doctor for diet and medication restrictions.  If you take an anticoagulant or anti-platelet medication (such as Coumadin , Lovenox , Pradaxa , Xarelto , Eliquis , etc.), please call your primary doctor for advice on holding this medication.  If you take aspirin you may continue to do so.  If you are or may be pregnant, please discuss the risks and benefits of this procedure with your doctor.    What happens during a colonoscopy?  Plan to spend up to two hours, starting at registration time, at the endoscopy center the day of your procedure. The colonoscopy takes an average of 15 to 30 minutes. Recovery time is about 30 minutes.    Before the exam:    You will change into a gown.    Your medical history and medication list will be reviewed with you, unless that has been done over the phone prior to the procedure.     A nurse will insert an intravenous (IV) line into your hand or arm.    The doctor will meet with you and will give you a consent form to sign.    During the exam:     Medicine will be given through the IV line to help you relax.     Your heart rate and oxygen levels will be monitored. If your blood pressure is low, you may be given fluids through the IV line.     The doctor will insert a flexible hollow tube, called a colonoscope, into your rectum. The scope will be advanced slowly through the large intestine (colon).    You may have a feeling of fullness or pressure.     If an abnormal tissue or a polyp is found, the doctor may remove it through the endoscope for closer examination, or biopsy. Tissue removal is painless    After the exam:           Any tissue samples removed during the exam will be sent to a lab for evaluation. It may take 5-7 working days for you to be notified of the results.      A nurse will provide you with complete discharge instructions before you leave the endoscopy center. Be sure to ask the nurse for specific instructions if you take blood thinners such as Aspirin, Coumadin or Plavix.     The doctor will prepare a full report for you and for the physician who referred you for the procedure.     Your doctor will talk with you about the initial results of your exam.      Medication given during the exam will prohibit you from driving for the rest of the day.     Following the exam, you may resume your normal diet. Your first meal should be light, no greasy foods. Avoid alcohol until the next day.     You may resume your regular activities the day after the procedure.     LOW-FIBER DIET  Foods RECOMMENDED Foods to AVOID   Breads, Cereal, Rice and Pasta:   White bread, rolls, biscuits, croissant and niko toast.   Waffles, American toast and pancakes.   White rice, noodles, pasta, macaroni and peeled cooked potatoes.   Plain crackers and saltines.   Cooked cereals: farina, cream of rice.   Cold cereals: Puffed Rice , Rice Krispies , Corn Flakes  and Special K    Breads, Cereal, Rice and Pasta:   Breads or rolls with nuts, seeds or fruit.   Whole wheat, pumpernickel, rye breads and cornbread.   Potatoes with skin, brown or wild rice, and kasha (buckwheat).     Vegetables:   Tender cooked and canned vegetables without seeds: carrots, asparagus tips, green or wax beans, pumpkin, spinach, lima beans. Vegetables:   Raw or steamed vegetables.   Vegetables with seeds.   Sauerkraut.   Winter squash, peas, broccoli, Brussel sprouts, cabbage, onions, cauliflower, baked beans, peas and corn.   Fruits:   Strained fruit juice.   Canned fruit, except pineapple.   Ripe bananas and melon. Fruits:   Prunes and prune juice.   Raw fruits.   Dried fruits: figs, dates and raisins.   Milk/Dairy:   Milk: plain or flavored.   Yogurt, custard and ice cream.   Cheese and cottage cheese Milk/Dairy:     Meat and  other proteins:   ground, well-cooked tender beef, lamb, ham, veal, pork, fish, poultry and organ meats.   Eggs.   Peanut butter without nuts. Meat and other proteins:   Tough, fibrous meats with gristle.   Dry beans, peas and lentils.   Peanut butter with nuts.   Tofu.   Fats, Snack, Sweets, Condiments and Beverages:   Margarine, butter, oils, mayonnaise, sour cream and salad dressing, plain gravy.   Sugar, hard candy, clear jelly, honey and syrup.   Spices, cooked herbs, bouillon, broth and soups made with allowed vegetable, ketchup and mustard.   Coffee, tea and carbonated drinks.   Plain cakes, cookies and pretzels.   Gelatin, plain puddings, custard, ice cream, sherbet and popsicles. Fats, Snack, Sweets, Condiments and Beverages:   Nuts, seeds and coconut.   Jam, marmalade and preserves.   Pickles, olives, relish and horseradish.   All desserts containing nuts, seeds, dried fruit and coconut; or made from whole grains or bran.   Candy made with nuts or seeds.   Popcorn.                           DIRECTIONS TO THE SURGERY CENTER    From the north ([facility])  Take [address] south, exit on [address] [address]. Get into the left hand gregg, turn left (east), go one-half mile to [address] and turn left. Go north to the first stoplight, take a right on [address] and follow it to the Surgery Center entrance.    From the south ([facility])  Take 35N to the [address]E split and exit on [address] [address]. On [address] [address], turn left (west) to [address]. Turn right (north) on [address]. Go north to the first stoplight, take a right on [address] and follow it to the Surgery Center entrance.    From the east via 35E ([facility])  Take [address]E south to [address] [address] exit. Turn right on [address] [address]. Go west to [address]. Turn right (north) on [address]. Go to the first stoplight, take a right and follow on [address] to the Surgery Center entrance.    From the  east via [address] ([place])  Take [address] west to [address]. Turn left (south) on [address] to [address]. Turn left (east) on [address] and follow it to the Surgery Center entrance.    From the west via [address] ([place])  Take [address] east to [address]. Turn right (south) on [address] to [address]. Turn left (east) on [address] and follow it to the Surgery Center entrance.

## 2020-10-22 NOTE — ANESTHESIA POSTPROCEDURE EVALUATION
Patient: Swetha Patterson    Procedure(s):  Esophagoscopy, gastroscopy, duodenoscopy with biopsies  Colonoscopy    Diagnosis:Hematochezia [K92.1]  Diagnosis Additional Information: No value filed.    Anesthesia Type:  MAC    Note:  Anesthesia Post Evaluation    Patient location during evaluation: Phase 2  Patient participation: Able to fully participate in evaluation  Level of consciousness: awake and alert  Pain management: adequate  Airway patency: patent  Cardiovascular status: acceptable  Respiratory status: acceptable  Hydration status: acceptable  PONV: controlled             Last vitals:  Vitals:    10/22/20 1453 10/22/20 1500 10/22/20 1524   BP: 108/78 133/78 137/89   Pulse: 89 92 61   Resp: 16 18    Temp: 97.8  F (36.6  C)     SpO2:  93% 96%         Electronically Signed By: Reddy Rosen MD  October 22, 2020  3:31 PM

## 2020-10-22 NOTE — LETTER
October 9, 2020      Swetha Patterson  [id number]  [facility] [address]        Dear Swetha,     Thank you for choosing [facility]. You are scheduled for the following service(s).   Please be aware that coverage of these services is subject to the terms and limitations of your health insurance plan.  Call member services at your health plan with any benefit or coverage questions.    You will need to have a History and Physical Exam done within 30 days of this scheduled procedures. Please arrange this with your primary care physician.    Date:  10/22/2020    Procedure:   COLONOSCOPY    Doctor: Dr. Marshall Mccormick                       Arrival Time:  12:30pm  *check in at the Surgery Center desk*     Procedure Time: 2:00pm      Location:   [facility]      Surgery Center (on the south side of the [facility])       [address] [place], [place] [address]      Colonoscopy is the most accurate test to detect colon polyps and colon cancer; and the only test where polyps can be removed. During this procedure, a doctor examines the lining of your large intestine and rectum through a flexible tube.   Transportation  You must arrange for a ride for the day of your procedure with a responsible adult. A taxi , Uber, etc, is not an option unless you are accompanied by a responsible adult. If you fail to arrange transportation with a responsible adult, your procedure will be cancelled and rescheduled.  NuLYTELY  PREP  Fill your enclosed  prescription for NuLYTELY  at your local pharmacy. Please call our office at [phone] if you did not receive a prescription.    PREPARATION FOR COLONOSCOPY  7 days before:    Discontinue fiber supplements and medications containing iron. This includes Metamucil  and Fibercon ; and multivitamins with iron.  3 days before:    Begin a low-fiber diet. A low-fiber diet helps making the cleanout more effective. For additional details on  low-fiber diet, please refer to the table on the last page.  2 days before:    Continue the low-fiber diet.     Drink at least 8 glasses of water throughout the day.     Stop eating solid foods at 11:45 pm.  1 day before:    In the morning: begin a clear liquid diet (liquids you can see through).     Examples of a clear liquid diet include: water, clear broth or bouillon, Gatorade, Pedialyte or Powerade, carbonated and non-carbonated soft drinks (Sprite , 7-Up , ginger ale), strained fruit juices without pulp (apple, white grape, white cranberry), Jell-O  and popsicles.     The following are not allowed on a clear liquid diet: red liquids, alcoholic beverages,dairy products (milk, creamer, and yogurt), protein shakes,  juice with pulp and chewing tobacco.    At 4pm: drink 1 (one) 8 oz glass of NuLYTELY  solution every 15 minutes until the bottle (approximately 16 glasses of 8 oz) is gone. Keep the solution refrigerated. Do not drink any other liquids while you are drinking the NuLYTELY  solution.    Over the course of the evening, drink an additional   liter of clear liquids and continue clear liquid diet.    COLON CLEANSING TIPS: drink adequate amounts of fluids before and after your colon cleansing to prevent dehydration. Stay near a toilet because you will have diarrhea. Even if you are sitting on the toilet, continue to drink the cleansing solution every 15 minutes. If you feel nauseous or vomit, rinse your mouth with water, take a 15 to 30-minute-break and then continue drinking the solution. You will be uncomfortable until the stool has flushed from your colon (in about 2 to 4 hours). You may feel chilled.    DAY OF YOUR PROCEDURE  You may take all of your morning medications including blood pressure medications, blood thinners (if you have not been instructed to stop these by our office), methadone, and anti-seizure medications with sips of water 3 hours prior to your procedure or earlier. Do not take insulin  or vitamins prior to your procedure. Continue the clear liquid diet.      4 hours prior:  STOP drinking all liquids at this time.     You are ready for the procedure, if you followed all instructions and your stool is no longer formed, but clear or yellow liquid. If you are unsure whether your colon is clean, please call our office at [phone] before you leave for your appointment.    Bring the following to your procedure:  - Insurance Card/Photo ID.   - List of current medications including over-the-counter medications and supplements.   - Your rescue inhaler if you currently use one to control asthma.    Canceling or rescheduling your appointment:   If you must cancel or reschedule your appointment, please call [phone] as soon as possible.    COLONOSCOPY PRE-PROCEDURE CHECKLIST  If you have diabetes, ask your regular doctor for diet and medication restrictions.  If you take an anticoagulant or anti-platelet medication (such as Coumadin , Lovenox , Pradaxa , Xarelto , Eliquis , etc.), please call your primary doctor for advice on holding this medication.  If you take aspirin you may continue to do so.  If you are or may be pregnant, please discuss the risks and benefits of this procedure with your doctor.    What happens during a colonoscopy?  Plan to spend up to two hours, starting at registration time, at the endoscopy center the day of your procedure. The colonoscopy takes an average of 15 to 30 minutes. Recovery time is about 30 minutes.    Before the exam:    You will change into a gown.    Your medical history and medication list will be reviewed with you, unless that has been done over the phone prior to the procedure.     A nurse will insert an intravenous (IV) line into your hand or arm.    The doctor will meet with you and will give you a consent form to sign.    During the exam:     Medicine will be given through the IV line to help you relax.     Your heart rate and oxygen levels will be  monitored. If your blood pressure is low, you may be given fluids through the IV line.     The doctor will insert a flexible hollow tube, called a colonoscope, into your rectum. The scope will be advanced slowly through the large intestine (colon).    You may have a feeling of fullness or pressure.     If an abnormal tissue or a polyp is found, the doctor may remove it through the endoscope for closer examination, or biopsy. Tissue removal is painless.    After the exam:           Any tissue samples removed during the exam will be sent to a lab for evaluation. It may take 5-7 working days for you to be notified of the results.     A nurse will provide you with complete discharge instructions before you leave the endoscopy center. Be sure to ask the nurse for specific instructions if you take blood thinners such as Aspirin, Coumadin or Plavix.     The doctor will prepare a full report for you and for the physician who referred you for the procedure.     Your doctor will talk with you about the initial results of your exam.      Medication given during the exam will prohibit you from driving for the rest of the day.     Following the exam, you may resume your normal diet. Your first meal should be light, no greasy foods. Avoid alcohol until the next day.     You may resume your regular activities the day after the procedure.     LOW-FIBER DIET  Foods RECOMMENDED Foods to AVOID   Breads, Cereal, Rice and Pasta:   White bread, rolls, biscuits, croissant and niko toast.   Waffles, Palestinian toast and pancakes.   White rice, noodles, pasta, macaroni and peeled cooked potatoes.   Plain crackers and saltines.   Cooked cereals: farina, cream of rice.   Cold cereals: Puffed Rice , Rice Krispies , Corn Flakes  and Special K    Breads, Cereal, Rice and Pasta:   Breads or rolls with nuts, seeds or fruit.   Whole wheat, pumpernickel, rye breads and cornbread.   Potatoes with skin, brown or wild rice, and kasha (buckwheat).      Vegetables:   Tender cooked and canned vegetables without seeds: carrots, asparagus tips, green or wax beans, pumpkin, spinach, lima beans. Vegetables:   Raw or steamed vegetables.   Vegetables with seeds.   Sauerkraut.   Winter squash, peas, broccoli, Brussel sprouts, cabbage, onions, cauliflower, baked beans, peas and corn.   Fruits:   Strained fruit juice.   Canned fruit, except pineapple.   Ripe bananas and melon. Fruits:   Prunes and prune juice.   Raw fruits.   Dried fruits: figs, dates and raisins.   Milk/Dairy:   Milk: plain or flavored.   Yogurt, custard and ice cream.   Cheese and cottage cheese Milk/Dairy:     Meat and other proteins:   ground, well-cooked tender beef, lamb, ham, veal, pork, fish, poultry and organ meats.   Eggs.   Peanut butter without nuts. Meat and other proteins:   Tough, fibrous meats with gristle.   Dry beans, peas and lentils.   Peanut butter with nuts.   Tofu.   Fats, Snack, Sweets, Condiments and Beverages:   Margarine, butter, oils, mayonnaise, sour cream and salad dressing, plain gravy.   Sugar, hard candy, clear jelly, honey and syrup.   Spices, cooked herbs, bouillon, broth and soups made with allowed vegetable, ketchup and mustard.   Coffee, tea and carbonated drinks.   Plain cakes, cookies and pretzels.   Gelatin, plain puddings, custard, ice cream, sherbet and popsicles. Fats, Snack, Sweets, Condiments and Beverages:   Nuts, seeds and coconut.   Jam, marmalade and preserves.   Pickles, olives, relish and horseradish.   All desserts containing nuts, seeds, dried fruit and coconut; or made from whole grains or bran.   Candy made with nuts or seeds.   Popcorn.       DIRECTIONS TO THE SURGERY CENTER  From the north ([facility])  Take 35W south, exit on [address] . Get into the left hand gregg, turn left (east), go one-half mile to [address] and turn left. Go north to the first stoplight, take a right on [address] and follow it to the  Surgery Center entrance.    From the south ([facility])  Take 35N to the 35E split and exit on [address] . On [address] , turn left (west) to [address]. Turn right (north) on [address]. Go north to the first stoplight, take a right on [address] and follow it to the Surgery Center entrance.    From the east via 35E ([facility])  Take 35E south to [place] [id number] exit. Turn right on [address] . Go west to [address]. Turn right (north) on [address]. Go to the first stoplight, take a right and follow on [address] to the Surgery Center entrance.    From the east via [address] ([facility])  Take [address] west to [address]. Turn left (south) on [address] to [address]. Turn left (east) on [address] and follow it to the Surgery Center entrance.    From the west via [address] ([place], [place])  Take [address] east to [address]. Turn right (south) on [address] to [address]. Turn left (east) on [address] and follow it to the Surgery Center entrance.

## 2020-10-22 NOTE — H&P
Pre-Endoscopy History and Physical     Swetha Patterson MRN# [id number]   YOB: 1970 Age: 50 year old     Date of Procedure: 10/22/2020  Primary care provider: Roro Chawla  Type of Endoscopy: Colonoscopy with possible biopsy, possible polypectomy and Gastroscopy with possible biopsy, possible dilation  Reason for Procedure: bleeding and dysphagia  Type of Anesthesia Anticipated: Conscious Sedation and MAC sedation    HPI:    Swetha is a 50 year old female who will be undergoing the above procedure.      A history and physical has been performed. The patient's medications and allergies have been reviewed. The risks and benefits of the procedure and the sedation options and risks were discussed with the patient.  All questions were answered and informed consent was obtained.      She denies a personal or family history of anesthesia complications or bleeding disorders.     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     DIAZ (nonalcoholic steatohepatitis)     Cervical HNP C5-6     Opioid type dependence, continuous (H)     Suicide attempt (H)     Intentional drug overdose (H)     Status post cervical spinal fusion     Depression     Vocal cord polyp     HTN, goal below 140/90     COPD, moderate (H)     History of opioid abuse (H) Rx was stopped when she failed urine drug test     Claudication of both lower extremities (H)     PAD (peripheral artery disease) (H)     Jaw claudication     Morbid obesity (H)     Obstructive chronic bronchitis with exacerbation (H)     Personal history of tobacco use, presenting hazards to health        Past Medical History:   Diagnosis Date     Anxiety state, unspecified      Asthma      Chronic pain     back pain from cyst     Contact dermatitis and  "other eczema, due to unspecified cause      COPD (chronic obstructive pulmonary disease) (H)      Depressive disorder, not elsewhere classified      Diabetes (H)      Emphysema with chronic bronchitis (H)      Esophageal reflux      Family history of ischemic heart disease      Fibromyalgia      Gastro-oesophageal reflux disease      History of emphysema      Hoarseness      HTN, goal below 140/90      Hyperlipidemia LDL goal <130      Liver disease     \"fatty liver\"     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)      PONV (postoperative nausea and vomiting)      Rectal bleeding         Past Surgical History:   Procedure Laterality Date     ARTHROSCOPY SHOULDER DECOMPRESSION Left 10/21/2015    Procedure: ARTHROSCOPY SHOULDER DECOMPRESSION;  Surgeon: Julien Milian MD;  Location: [facility] OR     BIOPSY ARTERY TEMPORAL Left 3/11/2020    Procedure: LEFT TEMPORAL ARTERY BIOPSY;  Surgeon: Ibeth Conner MD;  Location: [facility] OR     BRONCHIAL THERMOPLASTY N/A 11/14/2014    Procedure: BRONCHIAL THERMOPLASTY;  Surgeon: Ward Whitaker MD;  Location: [facility]     BRONCHIAL THERMOPLASTY N/A 12/19/2014    Procedure: BRONCHIAL THERMOPLASTY;  Surgeon: Ward Whitaker MD;  [facility]: [facility] OR     BRONCHIAL THERMOPLASTY N/A 2/6/2015    Procedure: BRONCHIAL THERMOPLASTY;  Surgeon: Ward Whitaker MD;  Location: [facility] OR     C APPENDECTOMY  at age 17     COLONOSCOPY N/A 11/26/2018    Procedure: COLONOSCOPY ([facility]);  Surgeon: Marshall Oakes MD;  Location: [facility] OR     DISCECTOMY, FUSION CERVICAL ANTERIOR ONE LEVEL, COMBINED N/A 5/1/2018    Procedure: COMBINED DISCECTOMY, FUSION CERVICAL ANTERIOR ONE LEVEL;  1.  C5-C6 anterior cervical diskectomy and fusion.    2.  C5-C6 application of intervertebral biomechanical device for interbody fusion purposes.    3.  C5-C6 anterior instrumentation using the standard Kristin InViZia 24 mm plate with four associated bone screws, 12 mm in length.  Inferior screws are fixed.  Superior " screws are va     ENT SURGERY  2015    polyps removed from vocal cords      EXCISE NODE MEDIASTINAL  4/26/2013    Procedure: EXCISE NODE MEDIASTINAL;;  Surgeon: vA Peña MD;  Location: [facility] OR     LAPAROSCOPIC CHOLECYSTECTOMY N/A 10/19/2017    Procedure: LAPAROSCOPIC CHOLECYSTECTOMY;  LAPAROSCOPIC CHOLECYSTECTOMY;  Surgeon: Ney Jerry MD;  Location:  OR     THORACOSCOPY  4/26/2013    Procedure: THORACOSCOPY;  LEFT VIDEO ASSISTED THORACOSCOPY, RESECTION OF POSTERIOR MEDIASTINAL MASS;  Surgeon: Av Peña MD;  Location:  OR     TONSILLECTOMY  as a kid     TONSILLECTOMY         Social History     Tobacco Use     Smoking status: Current Every Day Smoker     Packs/day: 0.50     Years: 30.00     Pack years: 15.00     Types: Cigarettes     Start date: 1/1/1985     Smokeless tobacco: Never Used     Tobacco comment: Is trying to quit, using nicotine gum and patch   Substance Use Topics     Alcohol use: No     Alcohol/week: 0.0 standard drinks       Family History   Problem Relation Age of Onset     Heart Disease Mother         murmur, mi     Hypertension Mother      Cerebrovascular Disease Mother      Depression Mother      Gallbladder Disease Mother      Asthma Mother      Family History Negative Father         does not know  him     Family History Negative Brother      Heart Disease Maternal Grandfather      Asthma Maternal Grandfather      Hypertension Maternal Grandfather      Cerebrovascular Disease Maternal Grandfather      Diabetes Maternal Grandfather      Asthma Sister      Hypertension Sister      Cerebrovascular Disease Sister      Hypertension Maternal Grandmother      Cerebrovascular Disease Maternal Grandmother        Prior to Admission medications    Medication Sig Start Date End Date Taking? Authorizing Provider   albuterol (PROAIR HFA/PROVENTIL HFA/VENTOLIN HFA) 108 (90 Base) MCG/ACT inhaler Inhale 2 puffs into the lungs every 6 hours 10/13/20  Yes Cammy  Roro Mehta MD   albuterol (PROAIR HFA/PROVENTIL HFA/VENTOLIN HFA) 108 (90 Base) MCG/ACT inhaler Inhale 2 puffs into the lungs every 6 hours as needed for shortness of breath / dyspnea 9/24/20  Yes Roro Chawla MD   albuterol (PROVENTIL) (2.5 MG/3ML) 0.083% neb solution Take 1 vial (2.5 mg) by nebulization every 6 hours as needed for shortness of breath / dyspnea or wheezing 12/12/19  Yes Roro Chawla MD   atorvastatin (LIPITOR) 80 MG tablet Take 1 tablet (80 mg) by mouth daily 6/3/20  Yes Roro Chawla MD   benzoyl peroxide 10 % EX external gel Apply topically 2 times daily 2/20/20  Yes Roro Chawla MD   buPROPion (WELLBUTRIN XL) 150 MG 24 hr tablet TAKE ONE TABLET BY MOUTH EVERY MORNING 9/11/20  Yes Roro Chawla MD   cetirizine HCl 10 MG CAPS Take 1 capsule (10 mg) by mouth daily as needed Takes in the spring. 10/11/19  Yes Roro Chawla MD   clindamycin 1 % EX external gel Apply topically 2 times daily 2/20/20  Yes Roro Chawla MD   clonazePAM (KLONOPIN) 1 MG tablet TAKE ONE TABLET BY MOUTH TWICE A DAY AS NEEDED FOR ANXIETY 8/24/20  Yes Reported, Patient   cyclobenzaprine (FLEXERIL) 5 MG tablet Take 1 tablet (5 mg) by mouth 3 times daily as needed for muscle spasms 9/27/20  Yes Roro Chawla MD   desonide (DESOWEN) 0.05 % external cream Apply topically as needed (rash) 7/4/20  Yes Roro Chawla MD   Fluticasone-Umeclidin-Vilanterol (TRELEGY ELLIPTA) 100-62.5-25 MCG/INH oral inhaler Inhale 1 puff into the lungs daily   Yes Reported, Patient   furosemide (LASIX) 20 MG tablet Take 1 tablet (20 mg) by mouth daily 6/3/20  Yes Roro Chawla MD   glipiZIDE (GLUCOTROL XL) 2.5 MG 24 hr tablet TAKE ONE TABLET BY MOUTH EVERY DAY 9/30/20  Yes Roro Chawla MD   hydrOXYzine (ATARAX) 50 MG tablet TAKE TWO TABLETS BY MOUTH  THREE TIMES A DAY 10/18/20  Yes Roro Chawla MD   lamoTRIgine (LAMICTAL) 100 MG tablet 100 mg daily  8/20/20  Yes Reported, Patient   lisinopril (ZESTRIL) 20 MG tablet Take 1 tablet (20 mg) by mouth daily 6/3/20  Yes Roro Chawla MD   metFORMIN (GLUCOPHAGE) 500 MG tablet Take 1 tablet (500 mg) by mouth daily (with breakfast) 6/3/20  Yes Roro Chawla MD   montelukast (SINGULAIR) 10 MG tablet Take 1 tablet (10 mg) by mouth At Bedtime 9/13/20  Yes Roro Chawla MD   nicotine (NICORETTE) 2 MG gum Place 1 each (2 mg) inside cheek as needed for smoking cessation 2/22/20  Yes Roro Chawla MD   nicotine 14 MG/24HR TD 24 hr patch Place 1 patch onto the skin every 24 hours 2/20/20  Yes Roro Chawla MD   nitroGLYcerin (NITROSTAT) 0.4 MG sublingual tablet FOR CHEST PAIN PLACE ONE TABLET UNDER THE TONGUE EVERY 5 MINUTES FOR 3 DOSES.  IF SYPTOMS PERSIST 5 MINUTES AFTER 1ST DOSE CALL 911 10/12/20  Yes Roro Chawla MD   nystatin 938873 UNIT/GM EX external powder Apply topically 2 times daily as needed (skin rash) 2/20/20  Yes Roro Chawla MD   omeprazole (PRILOSEC) 20 MG DR capsule Take 1 capsule (20 mg) by mouth 2 times daily 7/4/20  Yes Roro Chawla MD   ondansetron (ZOFRAN) 8 MG tablet TAKE ONE-HALF TO ONE TABLETS BY MOUTH EVERY 8 HOURS AS NEEDED FOR NAUSEA 8/28/20  Yes Roro Chawla MD   pregabalin (LYRICA) 50 MG capsule Take 1 capsule (50 mg) by mouth 3 times daily 10/12/20  Yes Roro Chawla MD   sulfamethoxazole-trimethoprim (BACTRIM DS) 800-160 MG tablet Take 1 tablet by mouth 2 times daily Take one tablet twice daily. 7/2/20  Yes Lamont Jordan MD   tiotropium (SPIRIVA HANDIHALER) 18 MCG IN inhaled capsule Inhale contents of one capsule daily.  Patient taking differently: Inhale contents of one capsule daily PRN  2/20/20  Yes Roro Chawla MD   VENTOLIN  (90 Base) MCG/ACT inhaler Inhale 2 puffs into the lungs every 6 hours 10/3/20  Yes Roro Chawla MD   acetaminophen (TYLENOL) 325 MG tablet Take 3 tablets (975 mg) by mouth every 6 hours as needed for pain 3/11/20   Ibeth oCnner MD   blood glucose (ACCU-CHEK OLINDA PLUS) test strip USE TO TEST BLOOD SUGAR ONE TIME DAILY OR AS DIRECTED 8/8/20   Roro Chawla MD   blood glucose (NO BRAND SPECIFIED) lancets standard Use to test blood sugar 1 times daily or as directed. 9/10/20   Roro Chawla MD   blood glucose (NO BRAND SPECIFIED) lancets standard Use to test blood sugar 1 time daily 6/2/20   Roro Chawla MD   blood glucose (NO BRAND SPECIFIED) test strip Use to test blood sugar 1 times daily or as directed.  Dispense Accu-chek Olinda Plus or per patient insurance 9/10/20   Roro Chawla MD   blood glucose monitoring (NO BRAND SPECIFIED) meter device kit Use to test blood sugar 1 times daily or as directed.  Dispense Accu-chek Olinda Plus or per insurance preference 9/10/20   Roro Chawla MD   blood glucose monitoring (NO BRAND SPECIFIED) meter device kit Use to test blood sugar 1 times daily or as directed. 5/29/20   Roro Chawla MD   fluticasone 50 MCG/ACT NA nasal spray Spray 2 sprays in nostril daily 2/20/20   Roro Chawla MD   ibuprofen (ADVIL/MOTRIN) 600 MG tablet Take 1 tablet (600 mg) by mouth every 6 hours as needed for pain 3/11/20   Ibeth Conner MD   order for DME Please provide oximeter 7/31/20   Megan Grimm MD   order for DME Equipment being ordered: Digital home blood pressure monitor kit 1/7/20   Roro Chawla MD   order for DME Equipment being ordered: Pulse Oxymeter 1/7/20   Roro Chawla MD       Allergies   Allergen Reactions  "    Codeine Nausea and Vomiting     vomiting     Cyclobenzaprine Nausea     Hydrocodone Nausea and Vomiting     Sulfa Drugs Nausea and Vomiting     Nausea     Gabapentin Rash        REVIEW OF SYSTEMS:   5 point ROS negative except as noted above in HPI, including Gen., Resp., CV, GI &  system review.    PHYSICAL EXAM:   BP (!) 146/76   Pulse 77   Temp 98.2  F (36.8  C) (Temporal)   Resp 20   Ht 1.626 m (5' 4\")   Wt 102.5 kg (226 lb)   LMP 04/15/2016 (Exact Date)   SpO2 98%   BMI 38.79 kg/m   Estimated body mass index is 38.79 kg/m  as calculated from the following:    Height as of this encounter: 1.626 m (5' 4\").    Weight as of this encounter: 102.5 kg (226 lb).   GENERAL APPEARANCE: alert, and oriented  MENTAL STATUS: alert  AIRWAY EXAM: Mallampatti Class II (visualization of the soft palate, fauces, and uvula)  RESP: lungs clear to auscultation - no rales, rhonchi or wheezes  CV: regular rates and rhythm  DIAGNOSTICS:    Not indicated    IMPRESSION   ASA Class 3 - Severe systemic disease, but not incapacitating    PLAN:   Plan for Colonoscopy with possible biopsy, possible polypectomy and Gastroscopy with possible biopsy, possible dilation. We discussed the risks, benefits and alternatives and the patient wished to proceed.    The above has been forwarded to the consulting provider.      Signed Electronically by: Marshall Mccormick MD  October 22, 2020          "

## 2020-10-22 NOTE — ANESTHESIA PREPROCEDURE EVALUATION
"Anesthesia Pre-Procedure Evaluation    Patient: Swetha Patterson   MRN: [id number] : 1970          Preoperative Diagnosis: Hematochezia [K92.1]    Procedure(s):  COLONOSCOPY (fv)    Past Medical History:   Diagnosis Date     Anxiety state, unspecified      Asthma      Chronic pain     back pain from cyst     Contact dermatitis and other eczema, due to unspecified cause      COPD (chronic obstructive pulmonary disease) (H)      Depressive disorder, not elsewhere classified      Diabetes (H)      Emphysema with chronic bronchitis (H)      Esophageal reflux      Family history of ischemic heart disease      Fibromyalgia      Gastro-oesophageal reflux disease      History of emphysema      Hoarseness      HTN, goal below 140/90      Hyperlipidemia LDL goal <130      Liver disease     \"fatty liver\"     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)      PONV (postoperative nausea and vomiting)      Rectal bleeding      Past Surgical History:   Procedure Laterality Date     ARTHROSCOPY SHOULDER DECOMPRESSION Left 10/21/2015    Procedure: ARTHROSCOPY SHOULDER DECOMPRESSION;  Surgeon: Julien Milian MD;  Location: [facility]     BIOPSY ARTERY TEMPORAL Left 3/11/2020    Procedure: LEFT TEMPORAL ARTERY BIOPSY;  Surgeon: Ibeth Conner MD;  Location: [facility]     BRONCHIAL THERMOPLASTY N/A 2014    Procedure: BRONCHIAL THERMOPLASTY;  Surgeon: Ward Wihtaker MD;  Location: [facility]     BRONCHIAL THERMOPLASTY N/A 2014    Procedure: BRONCHIAL THERMOPLASTY;  Surgeon: Ward Whitaker MD;  Location: [facility]     BRONCHIAL THERMOPLASTY N/A 2015    Procedure: BRONCHIAL THERMOPLASTY;  Surgeon: Ward Whitaker MD;  Location: [facility]     C APPENDECTOMY  at age 17     COLONOSCOPY N/A 2018    Procedure: COLONOSCOPY ([facility]);  Surgeon: Marshall Oaeks MD;  Location: [facility]     DISCECTOMY, FUSION CERVICAL ANTERIOR ONE LEVEL, COMBINED N/A 2018    Procedure: COMBINED DISCECTOMY, FUSION CERVICAL " ANTERIOR ONE LEVEL;  1.  C5-C6 anterior cervical diskectomy and fusion.    2.  C5-C6 application of intervertebral biomechanical device for interbody fusion purposes.    3.  C5-C6 anterior instrumentation using the standard Kristin InViZia 24 mm plate with four associated bone screws, 12 mm in length.  Inferior screws are fixed.  Superior screws are va     ENT SURGERY  2015    polyps removed from vocal cords      EXCISE NODE MEDIASTINAL  4/26/2013    Procedure: EXCISE NODE MEDIASTINAL;;  Surgeon: Av Peña MD;  Location:  OR     LAPAROSCOPIC CHOLECYSTECTOMY N/A 10/19/2017    Procedure: LAPAROSCOPIC CHOLECYSTECTOMY;  LAPAROSCOPIC CHOLECYSTECTOMY;  Surgeon: Ney Jerry MD;  Location:  OR     THORACOSCOPY  4/26/2013    Procedure: THORACOSCOPY;  LEFT VIDEO ASSISTED THORACOSCOPY, RESECTION OF POSTERIOR MEDIASTINAL MASS;  Surgeon: Av Peña MD;  Location:  OR     TONSILLECTOMY  as a kid     TONSILLECTOMY       Anesthesia Evaluation     . Pt has had prior anesthetic.     History of anesthetic complications   - PONV        ROS/MED HX    ENT/Pulmonary:     (+)tobacco use, Current use COPD, , . .   (-) sleep apnea   Neurologic:     (+)other neuro s/p cervical spine fusion    Cardiovascular:     (+) Dyslipidemia, hypertension-Peripheral Vascular Disease---. : . . . :. .       METS/Exercise Tolerance:     Hematologic:         Musculoskeletal:         GI/Hepatic:     (+) GERD       Renal/Genitourinary:         Endo:     (+) type II DM Obesity, .      Psychiatric:     (+) psychiatric history depression      Infectious Disease:         Malignancy:         Other:                          Physical Exam      Airway   Mallampati: III  TM distance: >3 FB  Neck ROM: full    Dental     Cardiovascular   Rhythm and rate: regular and normal      Pulmonary    breath sounds clear to auscultation            Lab Results   Component Value Date    WBC 9.0 09/29/2020    HGB 12.6 10/16/2020    HCT 39.6  "09/29/2020     09/29/2020    CRP 16.0 (H) 02/20/2020    SED 16 02/20/2020     09/29/2020    POTASSIUM 4.8 10/16/2020    CHLORIDE 108 09/29/2020    CO2 24 09/29/2020    BUN 19 09/29/2020    CR 1.07 (H) 09/29/2020    GLC 89 09/29/2020    GENE 9.3 09/29/2020    MAG 1.9 03/19/2013    ALBUMIN 3.5 02/20/2020    PROTTOTAL 7.6 02/20/2020    ALT 42 02/20/2020    AST 19 02/20/2020    ALKPHOS 181 (H) 02/20/2020    BILITOTAL 0.3 02/20/2020    LIPASE 117 09/10/2019    INR 0.94 09/29/2020    TSH 1.78 02/20/2020    T4 0.80 02/13/2015    HCG Negative 10/21/2015    HCGS Negative 09/10/2019       Preop Vitals  BP Readings from Last 3 Encounters:   10/22/20 (!) 146/76   10/16/20 98/64   09/29/20 108/76    Pulse Readings from Last 3 Encounters:   10/22/20 77   10/16/20 85   09/29/20 81      Resp Readings from Last 3 Encounters:   10/22/20 20   10/16/20 16   09/29/20 16    SpO2 Readings from Last 3 Encounters:   10/22/20 98%   10/16/20 98%   09/29/20 99%      Temp Readings from Last 1 Encounters:   10/22/20 98.2  F (36.8  C) (Temporal)    Ht Readings from Last 1 Encounters:   10/22/20 1.626 m (5' 4\")      Wt Readings from Last 1 Encounters:   10/22/20 102.5 kg (226 lb)    Estimated body mass index is 38.79 kg/m  as calculated from the following:    Height as of this encounter: 1.626 m (5' 4\").    Weight as of this encounter: 102.5 kg (226 lb).       Anesthesia Plan      History & Physical Review  History and physical reviewed and following examination; no interval change.    ASA Status:  3 .    NPO Status:  > 8 hours    Plan for MAC with Intravenous and Propofol induction. Maintenance will be TIVA.  Reason for MAC:  Deep or markedly invasive procedure (G8)  PONV prophylaxis:  Ondansetron (or other 5HT-3)         Postoperative Care  Postoperative pain management:  IV analgesics, Oral pain medications and Multi-modal analgesia.      Consents                 Yonathan Shields MD                    .  "

## 2020-10-23 LAB
COPATH REPORT: NORMAL
INTERPRETATION ECG - MUSE: NORMAL

## 2020-10-25 ENCOUNTER — TELEPHONE (OUTPATIENT)
Dept: CARE COORDINATION | Facility: CLINIC | Age: 50
End: 2020-10-25

## 2020-10-25 NOTE — TELEPHONE ENCOUNTER
PROVIDER NOTIFICATION OF MISSED VISIT - No Action Required  TODAY'S DATE:  10/25/20  PROVIDER:  /Maranda  NOTIFICATION METHOD (Fax #, Phone #l, EPIC, etc): Krystian  PATIENT NAME:  Rukhsana Patterson  PATIENT ID: [id number]  :  1970    Medicare Home Health regulation requires [facility] to notify the physician when the plan for visits has been altered.  We have provided fewer visits than ordered.            Missed service:  week of 10/18/20            Date(s) of missed service:  10/24            Reason:  Patient cancelled visit due to not wanting to see anyone. Verified she has sufficient supply of back up meds available to see her through next week.     The patient has been notified.  Please call our office at [phone] if this is not satisfactory to you. to notify Provider with missed visit information.

## 2020-10-26 ENCOUNTER — VIRTUAL VISIT (OUTPATIENT)
Dept: OTOLARYNGOLOGY | Facility: CLINIC | Age: 50
End: 2020-10-26
Payer: COMMERCIAL

## 2020-10-26 ENCOUNTER — OFFICE VISIT (OUTPATIENT)
Dept: OTOLARYNGOLOGY | Facility: CLINIC | Age: 50
End: 2020-10-26
Payer: COMMERCIAL

## 2020-10-26 VITALS
OXYGEN SATURATION: 96 % | HEART RATE: 74 BPM | BODY MASS INDEX: 38.07 KG/M2 | HEIGHT: 64 IN | WEIGHT: 223 LBS | TEMPERATURE: 97.1 F

## 2020-10-26 DIAGNOSIS — R49.0 DYSPHONIA: Primary | ICD-10-CM

## 2020-10-26 DIAGNOSIS — R49.0 DYSPHONIA: ICD-10-CM

## 2020-10-26 DIAGNOSIS — R05.3 CHRONIC COUGH: Primary | ICD-10-CM

## 2020-10-26 DIAGNOSIS — R07.0 THROAT PAIN: ICD-10-CM

## 2020-10-26 DIAGNOSIS — R10.13 ABDOMINAL PAIN, EPIGASTRIC: ICD-10-CM

## 2020-10-26 DIAGNOSIS — J38.7 IRRITABLE LARYNX: ICD-10-CM

## 2020-10-26 DIAGNOSIS — J38.1 POLYPOID CORDITIS: ICD-10-CM

## 2020-10-26 PROCEDURE — 99215 OFFICE O/P EST HI 40 MIN: CPT | Mod: 25 | Performed by: OTOLARYNGOLOGY

## 2020-10-26 PROCEDURE — 92524 BEHAVRAL QUALIT ANALYS VOICE: CPT | Mod: GN | Performed by: SPEECH-LANGUAGE PATHOLOGIST

## 2020-10-26 PROCEDURE — 99207 PR NO CHARGE LOS: CPT | Performed by: SPEECH-LANGUAGE PATHOLOGIST

## 2020-10-26 PROCEDURE — 31579 LARYNGOSCOPY TELESCOPIC: CPT | Performed by: OTOLARYNGOLOGY

## 2020-10-26 ASSESSMENT — MIFFLIN-ST. JEOR: SCORE: 1611.52

## 2020-10-26 ASSESSMENT — PAIN SCALES - GENERAL: PAINLEVEL: SEVERE PAIN (7)

## 2020-10-26 NOTE — LETTER
10/26/2020      RE: Swetha Patterson  [id number]  [facility] [address]       Dear Colleague:    Swetha Patterson recently returned for follow-up at the [facility]. My clinic note from our visit is enclosed below.  Speech recognition software may have been used in the documentation below; input is reviewed before signature to the best of my ability.     I appreciate the ongoing opportunity to participate in this patient's care.    Please feel free to contact me with any questions.      Sincerely yours,  Lis Talbert M.D., M.P.H.  , Laryngology  Director, [facility]  Otolaryngology- Head & Neck Surgery  [phone]            =====  HISTORY OF PRESENT ILLNESS:  Swetha Patterson is a pleasant 50-year-old female with past medical history including anxiety, chronic lung disease (asthma, emphysema, chronic bronchitis), mediastinal cyst, opioid dependence, reflux and tobacco exposure who returns in follow up today. 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    She completed a video visit with us in August and now returns for a laryngoscopy.    She is noting pain with oral intake, down by her stomach, and feels like it is in her diaphragm.  She had an EGD 10/22/20 which was unremarkable. She notes persistent throat discomfort as well.     She has not done any voice therapy yet. She has not had a swallow study yet.  She quit smoking two months ago; she still occasionally has a cigarette, but the cravings are reducing over time.      MEDICATIONS:     Current Outpatient Medications   Medication Sig Dispense Refill     acetaminophen (TYLENOL) 325 MG tablet Take 3 tablets (975 mg) by mouth every 6 hours as needed for pain 50 tablet 0     albuterol (PROAIR HFA/PROVENTIL HFA/VENTOLIN HFA) 108 (90 Base) MCG/ACT inhaler Inhale 2 puffs into the lungs every 6  hours 3 Inhaler 3     albuterol (PROAIR HFA/PROVENTIL HFA/VENTOLIN HFA) 108 (90 Base) MCG/ACT inhaler Inhale 2 puffs into the lungs every 6 hours as needed for shortness of breath / dyspnea 18 g 3     albuterol (PROVENTIL) (2.5 MG/3ML) 0.083% neb solution Take 1 vial (2.5 mg) by nebulization every 6 hours as needed for shortness of breath / dyspnea or wheezing 25 vial 3     atorvastatin (LIPITOR) 80 MG tablet Take 1 tablet (80 mg) by mouth daily 90 tablet 0     benzoyl peroxide 10 % EX external gel Apply topically 2 times daily 90 g 3     blood glucose (ACCU-CHEK ALLYSON PLUS) test strip USE TO TEST BLOOD SUGAR ONE TIME DAILY OR AS DIRECTED 100 each 0     blood glucose (NO BRAND SPECIFIED) lancets standard Use to test blood sugar 1 times daily or as directed. 100 each 3     blood glucose (NO BRAND SPECIFIED) lancets standard Use to test blood sugar 1 time daily 100 each 1     blood glucose (NO BRAND SPECIFIED) test strip Use to test blood sugar 1 times daily or as directed.  Dispense Accu-chek Allyson Plus or per patient insurance 100 strip 3     blood glucose monitoring (NO BRAND SPECIFIED) meter device kit Use to test blood sugar 1 times daily or as directed.  Dispense Accu-chek Allyson Plus or per insurance preference 1 kit 0     blood glucose monitoring (NO BRAND SPECIFIED) meter device kit Use to test blood sugar 1 times daily or as directed. 1 kit 0     buPROPion (WELLBUTRIN XL) 150 MG 24 hr tablet TAKE ONE TABLET BY MOUTH EVERY MORNING 90 tablet 0     cetirizine HCl 10 MG CAPS Take 1 capsule (10 mg) by mouth daily as needed Takes in the spring. 90 capsule 3     clindamycin 1 % EX external gel Apply topically 2 times daily 60 g 3     clonazePAM (KLONOPIN) 1 MG tablet TAKE ONE TABLET BY MOUTH TWICE A DAY AS NEEDED FOR ANXIETY       cyclobenzaprine (FLEXERIL) 5 MG tablet Take 1 tablet (5 mg) by mouth 3 times daily as needed for muscle spasms 30 tablet 1     desonide (DESOWEN) 0.05 % external cream Apply topically as  needed (rash) 60 g 0     fluticasone 50 MCG/ACT NA nasal spray Spray 2 sprays in nostril daily 16 g 5     Fluticasone-Umeclidin-Vilanterol (TRELEGY ELLIPTA) 100-62.5-25 MCG/INH oral inhaler Inhale 1 puff into the lungs daily       furosemide (LASIX) 20 MG tablet Take 1 tablet (20 mg) by mouth daily 90 tablet 0     glipiZIDE (GLUCOTROL XL) 2.5 MG 24 hr tablet TAKE ONE TABLET BY MOUTH EVERY DAY 30 tablet 1     hydrOXYzine (ATARAX) 50 MG tablet TAKE TWO TABLETS BY MOUTH THREE TIMES A  tablet 0     ibuprofen (ADVIL/MOTRIN) 600 MG tablet Take 1 tablet (600 mg) by mouth every 6 hours as needed for pain 30 tablet 0     lamoTRIgine (LAMICTAL) 100 MG tablet 100 mg daily        lisinopril (ZESTRIL) 20 MG tablet Take 1 tablet (20 mg) by mouth daily 90 tablet 0     metFORMIN (GLUCOPHAGE) 500 MG tablet Take 1 tablet (500 mg) by mouth daily (with breakfast) 90 tablet 0     montelukast (SINGULAIR) 10 MG tablet Take 1 tablet (10 mg) by mouth At Bedtime 90 tablet 4     nicotine (NICORETTE) 2 MG gum Place 1 each (2 mg) inside cheek as needed for smoking cessation 150 each 1     nicotine 14 MG/24HR TD 24 hr patch Place 1 patch onto the skin every 24 hours 30 patch 1     nitroGLYcerin (NITROSTAT) 0.4 MG sublingual tablet FOR CHEST PAIN PLACE ONE TABLET UNDER THE TONGUE EVERY 5 MINUTES FOR 3 DOSES.  IF SYPTOMS PERSIST 5 MINUTES AFTER 1ST DOSE CALL 911 25 tablet 0     nystatin 987487 UNIT/GM EX external powder Apply topically 2 times daily as needed (skin rash) 60 g 11     omeprazole (PRILOSEC) 20 MG DR capsule Take 1 capsule (20 mg) by mouth 2 times daily 180 capsule 1     ondansetron (ZOFRAN) 8 MG tablet TAKE ONE-HALF TO ONE TABLETS BY MOUTH EVERY 8 HOURS AS NEEDED FOR NAUSEA 30 tablet 1     order for DME Please provide oximeter 1 Device 0     order for DME Equipment being ordered: Digital home blood pressure monitor kit 1 Device 0     order for DME Equipment being ordered: Pulse Oxymeter 1 Device 0     pregabalin (LYRICA) 50 MG  capsule Take 1 capsule (50 mg) by mouth 3 times daily 90 capsule 1     sulfamethoxazole-trimethoprim (BACTRIM DS) 800-160 MG tablet Take 1 tablet by mouth 2 times daily Take one tablet twice daily. 60 tablet 3     tiotropium (SPIRIVA HANDIHALER) 18 MCG IN inhaled capsule Inhale contents of one capsule daily. (Patient taking differently: Inhale contents of one capsule daily PRN) 90 capsule 3     VENTOLIN  (90 Base) MCG/ACT inhaler Inhale 2 puffs into the lungs every 6 hours 1 Inhaler 1       ALLERGIES:    Allergies   Allergen Reactions     Codeine Nausea and Vomiting     vomiting     Cyclobenzaprine Nausea     Hydrocodone Nausea and Vomiting     Sulfa Drugs Nausea and Vomiting     Nausea     Gabapentin Rash       NEW PMH/PSH: None    REVIEW OF SYSTEMS:  The patient completed a comprehensive 11 point review of systems (below), which was reviewed. Positives are as noted below.  Patient Supplied Answers to Review of Systems   ENT ROS 10/26/2020   Constitutional Appetite change   Neurology -   Psychology Frequently feeling anxious   Eyes -   Ears, Nose, Throat Trouble swallowing, Hoarseness   Cardiopulmonary Wheezing   Gastrointestinal/Genitourinary -   Musculoskeletal Sore or stiff joints, Swollen joints, Back pain, Neck pain   Allergy/Immunology Rash   Hematologic -   Endocrine -       PHYSICAL EXAM:  General: The patient was alert and conversant, and in no acute distress.    Oral cavity/oropharynx: No masses or lesions. Tongue mobility and palate elevation intact and symmetric.  Neck: No palpable cervical lymphadenopathy, moderate  tenderness to palpation of the thyrohyoid space, which was narrow. No obvious thyroid abnormality. No left neck mass palpated; area of patient's prior concern appears possibly consistent with left clavicular head.  Resp: Breathing comfortably, no stridor or stertor.  Neuro: Symmetric facial function. Other cranial nerve function as documented above.  Psych: Normal affect, pleasant  and cooperative.  Voice/speech: Moderate dysphonia characterized by breathiness, roughness, strain and pitch instability.      Intake scores  Last 2 Scores for Patient-Answered VHI Questionnaire  VHI Total Score 8/18/2017 10/26/2020   VHI Total Score 32 20      Last 2 Scores for Patient-Answered EAT Questionnaire  EAT Total Score 8/18/2017   EAT Total Score 17      Last 2 Scores for Patient-Answered CSI Questionnaire  CSI Total Score 8/18/2017 10/26/2020   CSI Total Score 39 38           Procedure:   Flexible fiberoptic laryngoscopy and laryngovideostroboscopy  Indications: This procedure was warranted to evaluate the patient's laryngeal anatomy and function. Risks, benefits, and alternatives were discussed.  Description: After written informed consent was obtained, a time-out was performed to confirm patient identity, procedure, and procedure site. Topical 3% lidocaine with 0.25% phenylephrine was applied to the nasal cavities. I performed the endoscopy and no complications were apparent. Continuous and stroboscopic light were utilized to assess routine phonation and variable frequency phonation.  Performed by: Lis Talbert MD MPH  SLP: Kandice Felton, PhD, CCC-SLP   Findings: Normal nasopharynx. Normal base of tongue, valleculae, and epiglottis. Lingual tonsils symmetric. Vocal fold mobility: right: normal; left: normal. Medial edges of the vocal folds: mild to moderately polypoid bilaterally. No focal mucosal lesions were observed on the true vocal folds. Glissade produced appropriate elongation. There was moderate supraglottic recruitment with connected speech. Mucosa of false vocal folds, aryepiglottic folds, piriform sinuses, and posterior glottis unremarkable. Airway was patent. Response to the therapy probes was good. Similar findings on NBI.    The addition of stroboscopy allowed evaluation of the mucosal wave.   Amplitude: right: mildly decreased; left: mildly decreased. Symmetry: chasing  asymmetry. Closure pattern: complete and irregular. Closure plane: at glottic level. Phase distribution: normal.                  CT SCAN OF THE NECK WITH CONTRAST  10/7/2020   IMPRESSION:    1. Enlarged lingual tonsillar tissue, presumably reactive. Recommend  direct visualization.  2. Left mandibular tooth crown erosion and periapical abscess.       EXAM: CT ABDOMEN PELVIS W CONTRAST  LOCATION: [facility]  DATE/TIME: 9/29/2020   IMPRESSION:   1.  No acute abnormality in the abdomen or pelvis.  2.  Fatty liver.      IMPRESSION AND PLAN:   Swetha Patterson returns with a stable laryngeal exam overall.  She has polypoid changes of her vocal folds but no focal mucosal abnormalities or lesions.    She had previously expressed concern about a left neck mass.  Fortunately, CT scan of the neck was unremarkable.    Today, her main concern is her abdominal pain with swallowing, and she is also noting that sometimes her mucus can be quite sticky even if it is clear. She notes difficulty with dry eyes and a dry mouth.    Recommendations:    1) Dysphagia: VFSS for further evaluation of swallow function.    2) Voice: Speech therapy with goals including improving laryngeal hygiene, reducing laryngeal irritability, improving laryngeal efficiency and improving respiratory/phonatory coordination.    3) We encouraged her to discuss the ongoing abdominal pain, dry eyes/dry mouth with her primary care provider. She does have a history of mild elevation of CRP in February 2020. She has had some evaluation with Gastroenterology also and reports that additional testing is planned (possible Bravo pH).    I congratulated her on her tobacco cessation and encouraged her to keep up the great work.    Timing of follow-up will depend on outcomes of steps above. Her greater concern is about her abdominal pain today, and her laryngeal exam has remained stable, so we can be flexible about timing of follow up for her larynx. Typically  a one year follow up would be reasonable for a laryngeal exam, if her swallow study is unremarkable. I appreciate the opportunity to participate in the care of this pleasant patient.     Today's visit required additional screening time, PPE, and cleaning measures to allow for a safe in-person visit, due to the public health emergency.    Greater than 40 minutes were spent on this visit, of which more than 50% was spent on counseling and coordination of care.       Lis Talbert MD

## 2020-10-26 NOTE — PROGRESS NOTES
"  Swetha Patterson is a 50 year old female who is being cared for via a billable virtual visit.      The Swetha Patterson has been notified and verbally consented to the following:     \"This billable virtual visit will be conducted between you and your provider.\"     \"Patient has opted to conduct today's billable virtual visit vs an in-person appointment, and is not able to attend due to possible exposure to COVID-19\"    Video visits are billed at different rates depending on your insurance coverage.  Please reach out to your insurance provider with any questions.    If during the course of the call the provider feels the appointment is not appropriate, you will not be charged for this service.\"     Provider has received verbal consent for billable virtual visit from the patient? Yes    Will anyone else be joining your video visit? This is a joint visit with Dr. Talbert who is seeing the patient in the clinic.    Preferred method for receiving information: email    Video Visit Details:  Call initiated at: 3:14 PM  Call ended at: 3:40 PM  Platform used to conduct today's virtual appointment: [employer]  Location of provider: [facility]  Location of patient: [facility]  Tanner Ochoa Jr., M.D., F.A.C.S.  Lis Talbert M.D., M.P.H.  Palma Almonte M.D.  Kandice Felton, Ph.D., CCC/SLP  Dianne Medel M.M. (voice), M.A., CCC/SLP  Jefferson Swain M.M. (voice), M.A., [facility]/SLP    [facility]  VOICE/SPEECH/BREATHING THERAPY  CLINICAL FOLLOW-UP AND LARYNGEAL EXAMINATION REPORT    Patient: Swetha Patterson  Date of Service: 10/26/2020    HISTORY  PATIENT INFORMATION  Swetha Patterson was seen for follow-up in conjunction with a visit to Dr. Talbert today.  Please also refer to 's dictation.  She was last seen on 8/24/20 for a virtual evaluation with Dr. Talbert and my associate Dianne Medel.  Re-evaluation is warranted tody as she states her voice is worse, and we are now evaluating the " "laryngeal mechanics in the context of laryngeal examination    PROGRESS SINCE LAST VISIT  Ms. Patterson reports:    Her voice is changing: Her voice cracks and gets quiet, and it is hard to get things out    She feels a tickle on the right side below her jaw; this leads to a cough    She sometimes coughs something up that is not like regular mucus.  She states it \"freaks me out\"    She complains of food getting stuck in her lower esophagus (points to her epigastrium)  o She has to chew and chew her food, and it hurts when she swallows    She has reduced her smoking to about 1/4 pack/day    Her GI work-up did not show anything, but she has a follow-up in December  o She was urged to see us by the GI physician    She has not availed herself of speech therapy    OBJECTIVE FINDINGS  VOICE AND SPEECH EVALUATION  Ms. Patterson presents today with a voice quality that is characterized by     Variability, ranging from normal quality to strained with mild roughness  o This sometimes increases to moderate    Moderate narrow resonance    Pitch is within normal limits, from Eb3 to Bb3, but sounds higher due to the narrow resonance    Able to access upper register    Some abdominal squeeze at initiation of phonation, but she is able to take low breaths    LARYNGEAL EXAMINATION    Endoscopic laryngeal examination was performed by:  Lis Talbert MD,   I provided the protocol of instructions for the patient.  Type of exam:   Flexible endoscopy with chip-tip technology and stroboscopy  This exam shows:     Laryngeal and Vocal Fold Mucosa    Crowded pharyngeal area    Essentially healthy laryngeal mucosa    Posterior commissure hypertrophy    Unremarkable presence of thickened secretions on the vocal folds and throughout the laryngeal area    Vocal fold mucosa is   o Still mildly and diffusely edematous and irregular, consistent with mild Sue's edema    Neurological and Functional Integrity of the Larynx    Vocal folds are mobile and " "meet at midline; movement is brisk and symmetric; exam is neurologically normal     Airway is adequate    Some resistance to movement is evident during a task of 20 quickly repeated vowels    Elongation of the vocal folds for pitch increase is normal    On phonation, glottic closure is normal to pressed, depending on production technique, and partly due to the Sue's edema  o Glottis is irregular due to irregularity of edema    Supraglottic Function and Therapy Probes    Moderate anterior-posterior constriction of the supraglottic larynx during connected speech    Marked ventricular approximation during phonation;     Supraglottic function during singing is not improved    Therapy probes show   o Reduction in supraglottic hyperfunction during tasks that involved word-initial voiceless labiodental fricatives and instructions for \"yawn-sigh\" phonation     Stroboscopy provided the following additional information:    The mucosal wave is asymmetric due to the irregular and edematous mucosa  o It should be noted that skill for the sustained vowel task was poor, with increased ventricular approximation  - Improved vibration with a probe for \"yawn-sigh\" or breathy phonation    Dr. Talbert and I reviewed this laryngeal exam with Ms. Patterson today, and I provided pertinent explanations:    Her vocal symptoms and throat discomfort can be accounted for by the irregular, edematous vocal fold mucosa, as well as supraglottic hyperfunction    Her cough is likely related to mucosal irritation, as well as the supraglottic hyperfunction    Her swallowing problems cannot be accounted for by today's examination; Dr. Talbert has recommended a swallow study, but also recommended she discuss this with her PCP, as her location of pain is not necessarily related to swallowing problems    Speech therapy to address the cough, dysphonia, and throat discomfort is strongly encouraged    This has already been recommended, and a plan of therapy " already developed on 8/24      IMPRESSIONS/ RECOMMENDATIONS/ PLAN  IMPRESSIONS / RECOMMENDATIONS  IMPRESSIONS:   R05 (Chronic Cough)  R49.0 (Dysphonia)   R07.0 Throat Pain.      Based on today s laryngeal examination, it appears that:    Cough, dysphonia, and throat pain can both be accounted for by apparent mucosal irritation and muscular hyperfunction    Functional speech therapy is warranted in order to reduce all of the above    Swallowing problems require further evaluation; swallow study is encouraged    TREATMENT PLAN    Ms. Patterson will schedule functional speech therapy sessions.      Certification period: August 24 2020 - November 22, 2020    This treatment plan was developed with the patient who agreed with the recommendations.    TOTAL SERVICE TIME: 35 minutes  EVALUATION OF VOICE AND RESONANCE (35670)  NO CHARGE FACILITY FEE       Kandice Felton, Ph.D., [facility]-SLP  Speech-Language Pathologist  Director, [facility]  [phone]

## 2020-10-26 NOTE — LETTER
"10/26/2020       RE: Swetha Patterson  [address]  [place] [address]     Dear Colleague,    Thank you for referring your patient, Swetha Patterson, to the [facility] [place] [facility]. Please see a copy of my visit note below.      Swetha Patterson is a 50 year old female who is being cared for via a billable virtual visit.      The Swetha Patterson has been notified and verbally consented to the following:     \"This billable virtual visit will be conducted between you and your provider.\"     \"Patient has opted to conduct today's billable virtual visit vs an in-person appointment, and is not able to attend due to possible exposure to COVID-19\"    Video visits are billed at different rates depending on your insurance coverage.  Please reach out to your insurance provider with any questions.    If during the course of the call the provider feels the appointment is not appropriate, you will not be charged for this service.\"     Provider has received verbal consent for billable virtual visit from the patient? Yes    Will anyone else be joining your video visit? This is a joint visit with Dr. Talbert who is seeing the patient in the clinic.    Preferred method for receiving information: email    Video Visit Details:  Call initiated at: 3:14 PM  Call ended at: 3:40 PM  Platform used to conduct today's virtual appointment: [employer]  Location of provider: [facility]  Location of patient: Clinic    [facility]  Tanner Ochoa Jr., M.D., F.A.C.S.  iLs Talbert M.D., M.P.H.  Palma Almonte M.D.  Kandice Felton, Ph.D., CCC/SLP  Dianne Medel M.M. (voice), M.A., CCC/SLP  Jefferson Swain M.M. (voice), M.A., CCC/SLP    [facility]  VOICE/SPEECH/BREATHING THERAPY  CLINICAL FOLLOW-UP AND LARYNGEAL EXAMINATION REPORT    Patient: Swetha Patterson  Date of Service: 10/26/2020    HISTORY  PATIENT INFORMATION  Swetha Patterson was seen for " "follow-up in conjunction with a visit to Dr. Talbert today.  Please also refer to 's dictation.  She was last seen on 8/24/20 for a virtual evaluation with Dr. Talbert and my associate Dianne Medel.  Re-evaluation is warranted tody as she states her voice is worse, and we are now evaluating the laryngeal mechanics in the context of laryngeal examination    PROGRESS SINCE LAST VISIT  Ms. Patterson reports:    Her voice is changing: Her voice cracks and gets quiet, and it is hard to get things out    She feels a tickle on the right side below her jaw; this leads to a cough    She sometimes coughs something up that is not like regular mucus.  She states it \"freaks me out\"    She complains of food getting stuck in her lower esophagus (points to her epigastrium)  o She has to chew and chew her food, and it hurts when she swallows    She has reduced her smoking to about 1/4 pack/day    Her GI work-up did not show anything, but she has a follow-up in December  o She was urged to see us by the GI physician    She has not availed herself of speech therapy    OBJECTIVE FINDINGS  VOICE AND SPEECH EVALUATION  Ms. Patterson presents today with a voice quality that is characterized by     Variability, ranging from normal quality to strained with mild roughness  o This sometimes increases to moderate    Moderate narrow resonance    Pitch is within normal limits, from Eb3 to Bb3, but sounds higher due to the narrow resonance    Able to access upper register    Some abdominal squeeze at initiation of phonation, but she is able to take low breaths    LARYNGEAL EXAMINATION    Endoscopic laryngeal examination was performed by:  Lis Talbert MD,   I provided the protocol of instructions for the patient.  Type of exam:   Flexible endoscopy with chip-tip technology and stroboscopy  This exam shows:     Laryngeal and Vocal Fold Mucosa    Crowded pharyngeal area    Essentially healthy laryngeal mucosa    Posterior commissure " "hypertrophy    Unremarkable presence of thickened secretions on the vocal folds and throughout the laryngeal area    Vocal fold mucosa is   o Still mildly and diffusely edematous and irregular, consistent with mild Sue's edema    Neurological and Functional Integrity of the Larynx    Vocal folds are mobile and meet at midline; movement is brisk and symmetric; exam is neurologically normal     Airway is adequate    Some resistance to movement is evident during a task of 20 quickly repeated vowels    Elongation of the vocal folds for pitch increase is normal    On phonation, glottic closure is normal to pressed, depending on production technique, and partly due to the Sue's edema  o Glottis is irregular due to irregularity of edema    Supraglottic Function and Therapy Probes    Moderate anterior-posterior constriction of the supraglottic larynx during connected speech    Marked ventricular approximation during phonation;     Supraglottic function during singing is not improved    Therapy probes show   o Reduction in supraglottic hyperfunction during tasks that involved word-initial voiceless labiodental fricatives and instructions for \"yawn-sigh\" phonation     Stroboscopy provided the following additional information:    The mucosal wave is asymmetric due to the irregular and edematous mucosa  o It should be noted that skill for the sustained vowel task was poor, with increased ventricular approximation  - Improved vibration with a probe for \"yawn-sigh\" or breathy phonation    Dr. Talbert and I reviewed this laryngeal exam with Ms. Patterson today, and I provided pertinent explanations:    Her vocal symptoms and throat discomfort can be accounted for by the irregular, edematous vocal fold mucosa, as well as supraglottic hyperfunction    Her cough is likely related to mucosal irritation, as well as the supraglottic hyperfunction    Her swallowing problems cannot be accounted for by today's examination; Dr. Talbert has " recommended a swallow study, but also recommended she discuss this with her PCP, as her location of pain is not necessarily related to swallowing problems    Speech therapy to address the cough, dysphonia, and throat discomfort is strongly encouraged    This has already been recommended, and a plan of therapy already developed on 8/24      IMPRESSIONS/ RECOMMENDATIONS/ PLAN  IMPRESSIONS / RECOMMENDATIONS  IMPRESSIONS:   R05 (Chronic Cough)  R49.0 (Dysphonia)   R07.0 Throat Pain.      Based on today s laryngeal examination, it appears that:    Cough, dysphonia, and throat pain can both be accounted for by apparent mucosal irritation and muscular hyperfunction    Functional speech therapy is warranted in order to reduce all of the above    Swallowing problems require further evaluation; swallow study is encouraged    TREATMENT PLAN    Ms. Patterson will schedule functional speech therapy sessions.      Certification period: August 24 2020 - November 22, 2020    This treatment plan was developed with the patient who agreed with the recommendations.    TOTAL SERVICE TIME: 35 minutes  EVALUATION OF VOICE AND RESONANCE (46165)  NO CHARGE FACILITY FEE       Kandice Felton, Ph.D., CCC-SLP  Speech-Language Pathologist  Director, [facility]  [phone]

## 2020-10-26 NOTE — NURSING NOTE
"Chief Complaint   Patient presents with     RECHECK     Follow up       Pulse 74, temperature 97.1  F (36.2  C), temperature source Temporal, height 1.626 m (5' 4\"), weight 101.2 kg (223 lb), last menstrual period 04/15/2016, SpO2 96 %, not currently breastfeeding.    Annalisa Hamilton, EMT  "

## 2020-10-26 NOTE — PROGRESS NOTES
Dear Colleague:    Swetha Patterson recently returned for follow-up at the [facility]. My clinic note from our visit is enclosed below.  Speech recognition software may have been used in the documentation below; input is reviewed before signature to the best of my ability.     I appreciate the ongoing opportunity to participate in this patient's care.    Please feel free to contact me with any questions.      Sincerely yours,  Lis Talbert M.D., M.P.H.  , Laryngology  Director, [facility]  Otolaryngology- Head & Neck Surgery  [phone]            =====  HISTORY OF PRESENT ILLNESS:  Swetha Patterson is a pleasant 50-year-old female with past medical history including anxiety, chronic lung disease (asthma, emphysema, chronic bronchitis), mediastinal cyst, opioid dependence, reflux and tobacco exposure who returns in follow up today. 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    She completed a video visit with us in August and now returns for a laryngoscopy.    She is noting pain with oral intake, down by her stomach, and feels like it is in her diaphragm.  She had an EGD 10/22/20 which was unremarkable. She notes persistent throat discomfort as well.     She has not done any voice therapy yet. She has not had a swallow study yet.  She quit smoking two months ago; she still occasionally has a cigarette, but the cravings are reducing over time.      MEDICATIONS:     Current Outpatient Medications   Medication Sig Dispense Refill     acetaminophen (TYLENOL) 325 MG tablet Take 3 tablets (975 mg) by mouth every 6 hours as needed for pain 50 tablet 0     albuterol (PROAIR HFA/PROVENTIL HFA/VENTOLIN HFA) 108 (90 Base) MCG/ACT inhaler Inhale 2 puffs into the lungs every 6 hours 3 Inhaler 3     albuterol (PROAIR HFA/PROVENTIL HFA/VENTOLIN HFA) 108 (90 Base)  MCG/ACT inhaler Inhale 2 puffs into the lungs every 6 hours as needed for shortness of breath / dyspnea 18 g 3     albuterol (PROVENTIL) (2.5 MG/3ML) 0.083% neb solution Take 1 vial (2.5 mg) by nebulization every 6 hours as needed for shortness of breath / dyspnea or wheezing 25 vial 3     atorvastatin (LIPITOR) 80 MG tablet Take 1 tablet (80 mg) by mouth daily 90 tablet 0     benzoyl peroxide 10 % EX external gel Apply topically 2 times daily 90 g 3     blood glucose (ACCU-CHEK ALLYSON PLUS) test strip USE TO TEST BLOOD SUGAR ONE TIME DAILY OR AS DIRECTED 100 each 0     blood glucose (NO BRAND SPECIFIED) lancets standard Use to test blood sugar 1 times daily or as directed. 100 each 3     blood glucose (NO BRAND SPECIFIED) lancets standard Use to test blood sugar 1 time daily 100 each 1     blood glucose (NO BRAND SPECIFIED) test strip Use to test blood sugar 1 times daily or as directed.  Dispense Accu-chek Allyson Plus or per patient insurance 100 strip 3     blood glucose monitoring (NO BRAND SPECIFIED) meter device kit Use to test blood sugar 1 times daily or as directed.  Dispense Accu-chek Allyson Plus or per insurance preference 1 kit 0     blood glucose monitoring (NO BRAND SPECIFIED) meter device kit Use to test blood sugar 1 times daily or as directed. 1 kit 0     buPROPion (WELLBUTRIN XL) 150 MG 24 hr tablet TAKE ONE TABLET BY MOUTH EVERY MORNING 90 tablet 0     cetirizine HCl 10 MG CAPS Take 1 capsule (10 mg) by mouth daily as needed Takes in the spring. 90 capsule 3     clindamycin 1 % EX external gel Apply topically 2 times daily 60 g 3     clonazePAM (KLONOPIN) 1 MG tablet TAKE ONE TABLET BY MOUTH TWICE A DAY AS NEEDED FOR ANXIETY       cyclobenzaprine (FLEXERIL) 5 MG tablet Take 1 tablet (5 mg) by mouth 3 times daily as needed for muscle spasms 30 tablet 1     desonide (DESOWEN) 0.05 % external cream Apply topically as needed (rash) 60 g 0     fluticasone 50 MCG/ACT NA nasal spray Spray 2 sprays in  nostril daily 16 g 5     Fluticasone-Umeclidin-Vilanterol (TRELEGY ELLIPTA) 100-62.5-25 MCG/INH oral inhaler Inhale 1 puff into the lungs daily       furosemide (LASIX) 20 MG tablet Take 1 tablet (20 mg) by mouth daily 90 tablet 0     glipiZIDE (GLUCOTROL XL) 2.5 MG 24 hr tablet TAKE ONE TABLET BY MOUTH EVERY DAY 30 tablet 1     hydrOXYzine (ATARAX) 50 MG tablet TAKE TWO TABLETS BY MOUTH THREE TIMES A  tablet 0     ibuprofen (ADVIL/MOTRIN) 600 MG tablet Take 1 tablet (600 mg) by mouth every 6 hours as needed for pain 30 tablet 0     lamoTRIgine (LAMICTAL) 100 MG tablet 100 mg daily        lisinopril (ZESTRIL) 20 MG tablet Take 1 tablet (20 mg) by mouth daily 90 tablet 0     metFORMIN (GLUCOPHAGE) 500 MG tablet Take 1 tablet (500 mg) by mouth daily (with breakfast) 90 tablet 0     montelukast (SINGULAIR) 10 MG tablet Take 1 tablet (10 mg) by mouth At Bedtime 90 tablet 4     nicotine (NICORETTE) 2 MG gum Place 1 each (2 mg) inside cheek as needed for smoking cessation 150 each 1     nicotine 14 MG/24HR TD 24 hr patch Place 1 patch onto the skin every 24 hours 30 patch 1     nitroGLYcerin (NITROSTAT) 0.4 MG sublingual tablet FOR CHEST PAIN PLACE ONE TABLET UNDER THE TONGUE EVERY 5 MINUTES FOR 3 DOSES.  IF SYPTOMS PERSIST 5 MINUTES AFTER 1ST DOSE CALL 911 25 tablet 0     nystatin 740866 UNIT/GM EX external powder Apply topically 2 times daily as needed (skin rash) 60 g 11     omeprazole (PRILOSEC) 20 MG DR capsule Take 1 capsule (20 mg) by mouth 2 times daily 180 capsule 1     ondansetron (ZOFRAN) 8 MG tablet TAKE ONE-HALF TO ONE TABLETS BY MOUTH EVERY 8 HOURS AS NEEDED FOR NAUSEA 30 tablet 1     order for DME Please provide oximeter 1 Device 0     order for DME Equipment being ordered: Digital home blood pressure monitor kit 1 Device 0     order for DME Equipment being ordered: Pulse Oxymeter 1 Device 0     pregabalin (LYRICA) 50 MG capsule Take 1 capsule (50 mg) by mouth 3 times daily 90 capsule 1      sulfamethoxazole-trimethoprim (BACTRIM DS) 800-160 MG tablet Take 1 tablet by mouth 2 times daily Take one tablet twice daily. 60 tablet 3     tiotropium (SPIRIVA HANDIHALER) 18 MCG IN inhaled capsule Inhale contents of one capsule daily. (Patient taking differently: Inhale contents of one capsule daily PRN) 90 capsule 3     VENTOLIN  (90 Base) MCG/ACT inhaler Inhale 2 puffs into the lungs every 6 hours 1 Inhaler 1       ALLERGIES:    Allergies   Allergen Reactions     Codeine Nausea and Vomiting     vomiting     Cyclobenzaprine Nausea     Hydrocodone Nausea and Vomiting     Sulfa Drugs Nausea and Vomiting     Nausea     Gabapentin Rash       NEW PMH/PSH: None    REVIEW OF SYSTEMS:  The patient completed a comprehensive 11 point review of systems (below), which was reviewed. Positives are as noted below.  Patient Supplied Answers to Review of Systems   ENT ROS 10/26/2020   Constitutional Appetite change   Neurology -   Psychology Frequently feeling anxious   Eyes -   Ears, Nose, Throat Trouble swallowing, Hoarseness   Cardiopulmonary Wheezing   Gastrointestinal/Genitourinary -   Musculoskeletal Sore or stiff joints, Swollen joints, Back pain, Neck pain   Allergy/Immunology Rash   Hematologic -   Endocrine -       PHYSICAL EXAM:  General: The patient was alert and conversant, and in no acute distress.    Oral cavity/oropharynx: No masses or lesions. Tongue mobility and palate elevation intact and symmetric.  Neck: No palpable cervical lymphadenopathy, moderate  tenderness to palpation of the thyrohyoid space, which was narrow. No obvious thyroid abnormality. No left neck mass palpated; area of patient's prior concern appears possibly consistent with left clavicular head.  Resp: Breathing comfortably, no stridor or stertor.  Neuro: Symmetric facial function. Other cranial nerve function as documented above.  Psych: Normal affect, pleasant and cooperative.  Voice/speech: Moderate dysphonia characterized by  breathiness, roughness, strain and pitch instability.      Intake scores  Last 2 Scores for Patient-Answered VHI Questionnaire  VHI Total Score 8/18/2017 10/26/2020   VHI Total Score 32 20      Last 2 Scores for Patient-Answered EAT Questionnaire  EAT Total Score 8/18/2017   EAT Total Score 17      Last 2 Scores for Patient-Answered CSI Questionnaire  CSI Total Score 8/18/2017 10/26/2020   CSI Total Score 39 38           Procedure:   Flexible fiberoptic laryngoscopy and laryngovideostroboscopy  Indications: This procedure was warranted to evaluate the patient's laryngeal anatomy and function. Risks, benefits, and alternatives were discussed.  Description: After written informed consent was obtained, a time-out was performed to confirm patient identity, procedure, and procedure site. Topical 3% lidocaine with 0.25% phenylephrine was applied to the nasal cavities. I performed the endoscopy and no complications were apparent. Continuous and stroboscopic light were utilized to assess routine phonation and variable frequency phonation.  Performed by: Lis Talbert MD MPH  SLP: Kandice Felton, PhD, CCC-SLP   Findings: Normal nasopharynx. Normal base of tongue, valleculae, and epiglottis. Lingual tonsils symmetric. Vocal fold mobility: right: normal; left: normal. Medial edges of the vocal folds: mild to moderately polypoid bilaterally. No focal mucosal lesions were observed on the true vocal folds. Glissade produced appropriate elongation. There was moderate supraglottic recruitment with connected speech. Mucosa of false vocal folds, aryepiglottic folds, piriform sinuses, and posterior glottis unremarkable. Airway was patent. Response to the therapy probes was good. Similar findings on NBI.    The addition of stroboscopy allowed evaluation of the mucosal wave.   Amplitude: right: mildly decreased; left: mildly decreased. Symmetry: chasing asymmetry. Closure pattern: complete and irregular. Closure plane: at  glottic level. Phase distribution: normal.                  CT SCAN OF THE NECK WITH CONTRAST  10/7/2020   IMPRESSION:    1. Enlarged lingual tonsillar tissue, presumably reactive. Recommend  direct visualization.  2. Left mandibular tooth crown erosion and periapical abscess.       EXAM: CT ABDOMEN PELVIS W CONTRAST  LOCATION: [facility]  DATE/TIME: 9/29/2020   IMPRESSION:   1.  No acute abnormality in the abdomen or pelvis.  2.  Fatty liver.      IMPRESSION AND PLAN:   Swetha Patterson returns with a stable laryngeal exam overall.  She has polypoid changes of her vocal folds but no focal mucosal abnormalities or lesions.    She had previously expressed concern about a left neck mass.  Fortunately, CT scan of the neck was unremarkable.    Today, her main concern is her abdominal pain with swallowing, and she is also noting that sometimes her mucus can be quite sticky even if it is clear. She notes difficulty with dry eyes and a dry mouth.    Recommendations:    1) Dysphagia: VFSS for further evaluation of swallow function.    2) Voice: Speech therapy with goals including improving laryngeal hygiene, reducing laryngeal irritability, improving laryngeal efficiency and improving respiratory/phonatory coordination.    3) We encouraged her to discuss the ongoing abdominal pain, dry eyes/dry mouth with her primary care provider. She does have a history of mild elevation of CRP in February 2020. She has had some evaluation with Gastroenterology also and reports that additional testing is planned (possible Bravo pH).    I congratulated her on her tobacco cessation and encouraged her to keep up the great work.    Timing of follow-up will depend on outcomes of steps above. Her greater concern is about her abdominal pain today, and her laryngeal exam has remained stable, so we can be flexible about timing of follow up for her larynx. Typically a one year follow up would be reasonable for a laryngeal exam, if her  swallow study is unremarkable. I appreciate the opportunity to participate in the care of this pleasant patient.     Today's visit required additional screening time, PPE, and cleaning measures to allow for a safe in-person visit, due to the public health emergency.    Greater than 40 minutes were spent on this visit, of which more than 50% was spent on counseling and coordination of care.

## 2020-10-26 NOTE — LETTER
10/26/2020      RE: Swetha Patterson  [id number]  [facility] [address]       Dear Colleague:    Swetha Patterson recently returned for follow-up at the [facility]. My clinic note from our visit is enclosed below.  Speech recognition software may have been used in the documentation below; input is reviewed before signature to the best of my ability.     I appreciate the ongoing opportunity to participate in this patient's care.    Please feel free to contact me with any questions.      Sincerely yours,  Lis Talbert M.D., M.P.H.  , Laryngology  Director, [facility]  Otolaryngology- Head & Neck Surgery  [phone]            =====  HISTORY OF PRESENT ILLNESS:  Swetha Patterson is a pleasant 50-year-old female with past medical history including anxiety, chronic lung disease (asthma, emphysema, chronic bronchitis), mediastinal cyst, opioid dependence, reflux and tobacco exposure who returns in follow up today. 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    She completed a video visit with us in August and now returns for a laryngoscopy.    She is noting pain with oral intake, down by her stomach, and feels like it is in her diaphragm.  She had an EGD 10/22/20 which was unremarkable. She notes persistent throat discomfort as well.     She has not done any voice therapy yet. She has not had a swallow study yet.  She quit smoking two months ago; she still occasionally has a cigarette, but the cravings are reducing over time.      MEDICATIONS:     Current Outpatient Medications   Medication Sig Dispense Refill     acetaminophen (TYLENOL) 325 MG tablet Take 3 tablets (975 mg) by mouth every 6 hours as needed for pain 50 tablet 0     albuterol (PROAIR HFA/PROVENTIL HFA/VENTOLIN HFA) 108 (90 Base) MCG/ACT inhaler Inhale 2 puffs into the lungs every 6  hours 3 Inhaler 3     albuterol (PROAIR HFA/PROVENTIL HFA/VENTOLIN HFA) 108 (90 Base) MCG/ACT inhaler Inhale 2 puffs into the lungs every 6 hours as needed for shortness of breath / dyspnea 18 g 3     albuterol (PROVENTIL) (2.5 MG/3ML) 0.083% neb solution Take 1 vial (2.5 mg) by nebulization every 6 hours as needed for shortness of breath / dyspnea or wheezing 25 vial 3     atorvastatin (LIPITOR) 80 MG tablet Take 1 tablet (80 mg) by mouth daily 90 tablet 0     benzoyl peroxide 10 % EX external gel Apply topically 2 times daily 90 g 3     blood glucose (ACCU-CHEK ALLYSON PLUS) test strip USE TO TEST BLOOD SUGAR ONE TIME DAILY OR AS DIRECTED 100 each 0     blood glucose (NO BRAND SPECIFIED) lancets standard Use to test blood sugar 1 times daily or as directed. 100 each 3     blood glucose (NO BRAND SPECIFIED) lancets standard Use to test blood sugar 1 time daily 100 each 1     blood glucose (NO BRAND SPECIFIED) test strip Use to test blood sugar 1 times daily or as directed.  Dispense Accu-chek Allyson Plus or per patient insurance 100 strip 3     blood glucose monitoring (NO BRAND SPECIFIED) meter device kit Use to test blood sugar 1 times daily or as directed.  Dispense Accu-chek Allyson Plus or per insurance preference 1 kit 0     blood glucose monitoring (NO BRAND SPECIFIED) meter device kit Use to test blood sugar 1 times daily or as directed. 1 kit 0     buPROPion (WELLBUTRIN XL) 150 MG 24 hr tablet TAKE ONE TABLET BY MOUTH EVERY MORNING 90 tablet 0     cetirizine HCl 10 MG CAPS Take 1 capsule (10 mg) by mouth daily as needed Takes in the spring. 90 capsule 3     clindamycin 1 % EX external gel Apply topically 2 times daily 60 g 3     clonazePAM (KLONOPIN) 1 MG tablet TAKE ONE TABLET BY MOUTH TWICE A DAY AS NEEDED FOR ANXIETY       cyclobenzaprine (FLEXERIL) 5 MG tablet Take 1 tablet (5 mg) by mouth 3 times daily as needed for muscle spasms 30 tablet 1     desonide (DESOWEN) 0.05 % external cream Apply topically as  needed (rash) 60 g 0     fluticasone 50 MCG/ACT NA nasal spray Spray 2 sprays in nostril daily 16 g 5     Fluticasone-Umeclidin-Vilanterol (TRELEGY ELLIPTA) 100-62.5-25 MCG/INH oral inhaler Inhale 1 puff into the lungs daily       furosemide (LASIX) 20 MG tablet Take 1 tablet (20 mg) by mouth daily 90 tablet 0     glipiZIDE (GLUCOTROL XL) 2.5 MG 24 hr tablet TAKE ONE TABLET BY MOUTH EVERY DAY 30 tablet 1     hydrOXYzine (ATARAX) 50 MG tablet TAKE TWO TABLETS BY MOUTH THREE TIMES A  tablet 0     ibuprofen (ADVIL/MOTRIN) 600 MG tablet Take 1 tablet (600 mg) by mouth every 6 hours as needed for pain 30 tablet 0     lamoTRIgine (LAMICTAL) 100 MG tablet 100 mg daily        lisinopril (ZESTRIL) 20 MG tablet Take 1 tablet (20 mg) by mouth daily 90 tablet 0     metFORMIN (GLUCOPHAGE) 500 MG tablet Take 1 tablet (500 mg) by mouth daily (with breakfast) 90 tablet 0     montelukast (SINGULAIR) 10 MG tablet Take 1 tablet (10 mg) by mouth At Bedtime 90 tablet 4     nicotine (NICORETTE) 2 MG gum Place 1 each (2 mg) inside cheek as needed for smoking cessation 150 each 1     nicotine 14 MG/24HR TD 24 hr patch Place 1 patch onto the skin every 24 hours 30 patch 1     nitroGLYcerin (NITROSTAT) 0.4 MG sublingual tablet FOR CHEST PAIN PLACE ONE TABLET UNDER THE TONGUE EVERY 5 MINUTES FOR 3 DOSES.  IF SYPTOMS PERSIST 5 MINUTES AFTER 1ST DOSE CALL 911 25 tablet 0     nystatin [id number] UNIT/GM EX external powder Apply topically 2 times daily as needed (skin rash) 60 g 11     omeprazole (PRILOSEC) 20 MG DR capsule Take 1 capsule (20 mg) by mouth 2 times daily 180 capsule 1     ondansetron (ZOFRAN) 8 MG tablet TAKE ONE-HALF TO ONE TABLETS BY MOUTH EVERY 8 HOURS AS NEEDED FOR NAUSEA 30 tablet 1     order for DME Please provide oximeter 1 Device 0     order for DME Equipment being ordered: Digital home blood pressure monitor kit 1 Device 0     order for DME Equipment being ordered: Pulse Oxymeter 1 Device 0     pregabalin (LYRICA) 50 MG  capsule Take 1 capsule (50 mg) by mouth 3 times daily 90 capsule 1     sulfamethoxazole-trimethoprim (BACTRIM DS) 800-160 MG tablet Take 1 tablet by mouth 2 times daily Take one tablet twice daily. 60 tablet 3     tiotropium (SPIRIVA HANDIHALER) 18 MCG IN inhaled capsule Inhale contents of one capsule daily. (Patient taking differently: Inhale contents of one capsule daily PRN) 90 capsule 3     VENTOLIN  (90 Base) MCG/ACT inhaler Inhale 2 puffs into the lungs every 6 hours 1 Inhaler 1       ALLERGIES:    Allergies   Allergen Reactions     Codeine Nausea and Vomiting     vomiting     Cyclobenzaprine Nausea     Hydrocodone Nausea and Vomiting     Sulfa Drugs Nausea and Vomiting     Nausea     Gabapentin Rash       NEW PMH/PSH: None    REVIEW OF SYSTEMS:  The patient completed a comprehensive 11 point review of systems (below), which was reviewed. Positives are as noted below.  Patient Supplied Answers to Review of Systems   ENT ROS 10/26/2020   Constitutional Appetite change   Neurology -   Psychology Frequently feeling anxious   Eyes -   Ears, Nose, Throat Trouble swallowing, Hoarseness   Cardiopulmonary Wheezing   Gastrointestinal/Genitourinary -   Musculoskeletal Sore or stiff joints, Swollen joints, Back pain, Neck pain   Allergy/Immunology Rash   Hematologic -   Endocrine -       PHYSICAL EXAM:  General: The patient was alert and conversant, and in no acute distress.    Oral cavity/oropharynx: No masses or lesions. Tongue mobility and palate elevation intact and symmetric.  Neck: No palpable cervical lymphadenopathy, moderate  tenderness to palpation of the thyrohyoid space, which was narrow. No obvious thyroid abnormality. No left neck mass palpated; area of patient's prior concern appears possibly consistent with left clavicular head.  Resp: Breathing comfortably, no stridor or stertor.  Neuro: Symmetric facial function. Other cranial nerve function as documented above.  Psych: Normal affect, pleasant  and cooperative.  Voice/speech: Moderate dysphonia characterized by breathiness, roughness, strain and pitch instability.      Intake scores  Last 2 Scores for Patient-Answered VHI Questionnaire  VHI Total Score 8/18/2017 10/26/2020   VHI Total Score 32 20      Last 2 Scores for Patient-Answered EAT Questionnaire  EAT Total Score 8/18/2017   EAT Total Score 17      Last 2 Scores for Patient-Answered CSI Questionnaire  CSI Total Score 8/18/2017 10/26/2020   CSI Total Score 39 38           Procedure:   Flexible fiberoptic laryngoscopy and laryngovideostroboscopy  Indications: This procedure was warranted to evaluate the patient's laryngeal anatomy and function. Risks, benefits, and alternatives were discussed.  Description: After written informed consent was obtained, a time-out was performed to confirm patient identity, procedure, and procedure site. Topical 3% lidocaine with 0.25% phenylephrine was applied to the nasal cavities. I performed the endoscopy and no complications were apparent. Continuous and stroboscopic light were utilized to assess routine phonation and variable frequency phonation.  Performed by: Lis Talbert MD MPH  SLP: Kandice Felton, PhD, CCC-SLP   Findings: Normal nasopharynx. Normal base of tongue, valleculae, and epiglottis. Lingual tonsils symmetric. Vocal fold mobility: right: normal; left: normal. Medial edges of the vocal folds: mild to moderately polypoid bilaterally. No focal mucosal lesions were observed on the true vocal folds. Glissade produced appropriate elongation. There was moderate supraglottic recruitment with connected speech. Mucosa of false vocal folds, aryepiglottic folds, piriform sinuses, and posterior glottis unremarkable. Airway was patent. Response to the therapy probes was good. Similar findings on NBI.    The addition of stroboscopy allowed evaluation of the mucosal wave.   Amplitude: right: mildly decreased; left: mildly decreased. Symmetry: chasing  asymmetry. Closure pattern: complete and irregular. Closure plane: at glottic level. Phase distribution: normal.                  CT SCAN OF THE NECK WITH CONTRAST  10/7/2020   IMPRESSION:    1. Enlarged lingual tonsillar tissue, presumably reactive. Recommend  direct visualization.  2. Left mandibular tooth crown erosion and periapical abscess.       EXAM: CT ABDOMEN PELVIS W CONTRAST  LOCATION: [facility]  DATE/TIME: 9/29/2020   IMPRESSION:   1.  No acute abnormality in the abdomen or pelvis.  2.  Fatty liver.      IMPRESSION AND PLAN:   Swetha Patterson returns with a stable laryngeal exam overall.  She has polypoid changes of her vocal folds but no focal mucosal abnormalities or lesions.    She had previously expressed concern about a left neck mass.  Fortunately, CT scan of the neck was unremarkable.    Today, her main concern is her abdominal pain with swallowing, and she is also noting that sometimes her mucus can be quite sticky even if it is clear. She notes difficulty with dry eyes and a dry mouth.    Recommendations:    1) Dysphagia: VFSS for further evaluation of swallow function.    2) Voice: Speech therapy with goals including improving laryngeal hygiene, reducing laryngeal irritability, improving laryngeal efficiency and improving respiratory/phonatory coordination.    3) We encouraged her to discuss the ongoing abdominal pain, dry eyes/dry mouth with her primary care provider. She does have a history of mild elevation of CRP in February 2020. She has had some evaluation with Gastroenterology also and reports that additional testing is planned (possible Bravo pH).    I congratulated her on her tobacco cessation and encouraged her to keep up the great work.    Timing of follow-up will depend on outcomes of steps above. Her greater concern is about her abdominal pain today, and her laryngeal exam has remained stable, so we can be flexible about timing of follow up for her larynx. Typically  a one year follow up would be reasonable for a laryngeal exam, if her swallow study is unremarkable. I appreciate the opportunity to participate in the care of this pleasant patient.     Today's visit required additional screening time, PPE, and cleaning measures to allow for a safe in-person visit, due to the public health emergency.    Greater than 40 minutes were spent on this visit, of which more than 50% was spent on counseling and coordination of care.       Lis Talbert MD

## 2020-10-29 ENCOUNTER — TELEPHONE (OUTPATIENT)
Dept: INTERNAL MEDICINE | Facility: CLINIC | Age: 50
End: 2020-10-29

## 2020-10-29 NOTE — TELEPHONE ENCOUNTER
Patient is calling because a change was made to her blood pressure medication at her recent preop. Patient says now today her blood pressure was 176/110.

## 2020-10-29 NOTE — TELEPHONE ENCOUNTER
Call placed to [facility] who work with patient, tried to find out what nurse [place]  Patient states that they have not been there in 2 wks and they are the ones that set up her meds.  Cherelle Phoenix at [facility] ([phone]) is the SW who works with patient as care coordinator.  Left message for Cherelle to call back after reaching out to patient.  SERJIO Chanrda R.N.

## 2020-10-29 NOTE — TELEPHONE ENCOUNTER
Suggest the patient schedule a nurse blood pressure appointment and bring her home machine in with her so we can check the accuracy of the machine first.  She should also schedule follow-up appointment with her primary provider, Dr. Low. If symptoms are worsening, should be evaluated in the ED.

## 2020-10-29 NOTE — TELEPHONE ENCOUNTER
"Per 10/16/20 pre-op visit:  HTN, goal below 140/90  Plan: BP low, advised to stop amlodipine and hold lisinopril if SBP < 100  Written instructions given, continue to monitor BP and call if SBP < 100 or > 140    Patient seems a little confused about what role medications play in controlling her BP.  She kept saying \"the readings kept going lower and lower so I started taking both pills again\" indicating she had restarted the Amlodipine 10/25/20 and continued the Lisinopril.  Patient is crying intermittently, has rapid speech, is scared, having a lot of \"torso\" pain and no once can figure out why.  \"they are missing something\".    Sunday she went back to taking the Amlodipine  Below are some of her readings at home.  There is such variation, one questions the accuracy of her monitor.  192/58  97/81  103/66  263/165 91/63  94/65   107/77  276/84  113/78  150/59  States she is not feeling well at times with headache and weakness.  No appetite.  Gets a burning pain in her esophagus and stomach even with drinking water though has been told she has no refulx. Rates her pain 8 out of 10.  Advised patient she should be seen in ER, patient refuses stating they won't do anything for her.  Has a video swallow and another test coming up Nov and Dec.  SERJIO Chandra R.N.        "

## 2020-10-30 ENCOUNTER — TELEPHONE (OUTPATIENT)
Dept: INTERNAL MEDICINE | Facility: CLINIC | Age: 50
End: 2020-10-30

## 2020-10-30 DIAGNOSIS — I10 HTN, GOAL BELOW 140/90: ICD-10-CM

## 2020-10-30 DIAGNOSIS — L30.9 ECZEMA, UNSPECIFIED TYPE: Primary | ICD-10-CM

## 2020-10-30 DIAGNOSIS — J44.9 COPD, MODERATE (H): Primary | ICD-10-CM

## 2020-10-30 DIAGNOSIS — K21.00 GASTROESOPHAGEAL REFLUX DISEASE WITH ESOPHAGITIS, UNSPECIFIED WHETHER HEMORRHAGE: ICD-10-CM

## 2020-10-30 DIAGNOSIS — F41.9 ANXIETY: ICD-10-CM

## 2020-10-30 NOTE — TELEPHONE ENCOUNTER
Marilyn from  Homecare calling  Patient is requesting a tablet form for omeprazole (PRILOSEC) 20 mg instead of a capsule. She also has eczema on both arms and is requesting a cream to be sent to [facility]  Ok to call Gloria [phone]

## 2020-10-30 NOTE — TELEPHONE ENCOUNTER
Called patient, advised of provider message below, patient agrees with the plan. Patient states her nurse was there today and her BP was 108/64. Patient transferred to schedule appointments.   Dianelys Rosen RN

## 2020-11-02 DIAGNOSIS — Z53.9 DIAGNOSIS NOT YET DEFINED: Primary | ICD-10-CM

## 2020-11-02 PROCEDURE — G0179 MD RECERTIFICATION HHA PT: HCPCS | Performed by: INTERNAL MEDICINE

## 2020-11-02 RX ORDER — OMEPRAZOLE 20 MG/1
20 TABLET, DELAYED RELEASE ORAL 2 TIMES DAILY
Qty: 180 TABLET | Refills: 1 | Status: SHIPPED | OUTPATIENT
Start: 2020-11-02 | End: 2021-04-16

## 2020-11-02 RX ORDER — HYDROCORTISONE 2.5 %
CREAM (GRAM) TOPICAL 2 TIMES DAILY
Qty: 30 G | Refills: 1 | Status: SHIPPED | OUTPATIENT
Start: 2020-11-02 | End: 2021-02-03

## 2020-11-04 ENCOUNTER — TELEPHONE (OUTPATIENT)
Dept: INTERNAL MEDICINE | Facility: CLINIC | Age: 50
End: 2020-11-04

## 2020-11-04 DIAGNOSIS — J44.9 COPD, MODERATE (H): ICD-10-CM

## 2020-11-04 NOTE — TELEPHONE ENCOUNTER
Prior Authorization Retail Medication Request    Medication/Dose: Albuterol Sulfate HFA  ICD code (if different than what is on RX):  COPD, moderate (H) [J44.9]   Previously Tried and Failed:  PROAIR,PROVENOLIN, AND VVENTOLIN   Rationale:      Insurance Name:  Insurance ID:        Pharmacy Information (if different than what is on RX)  Name:    Phone:

## 2020-11-04 NOTE — TELEPHONE ENCOUNTER
Central Prior Authorization Team   Phone: [phone]      PA NOT NEEDED    Medication: Albuterol Sulfate HFA-PA NOT NEEDED  Insurance Company:    Pharmacy Filling the Rx: [facility] - [address]  Filling Pharmacy Phone: [phone]  Filling Pharmacy Fax:    Start Date: 11/4/2020    Called [facility] to see what generic albuterol they are running, per Dione at [facility], they do not have this person in their system.  I called the [facility] in [place] and they do fill this prescription for the patient but they are needing a new prescription for the patient.  Please send a new prescription to the [facility] in [place] for the patient.

## 2020-11-05 RX ORDER — AMLODIPINE BESYLATE 5 MG/1
TABLET ORAL
Qty: 90 TABLET | Refills: 0 | Status: ON HOLD | OUTPATIENT
Start: 2020-11-05 | End: 2020-11-20

## 2020-11-05 RX ORDER — ALBUTEROL SULFATE 90 UG/1
AEROSOL, METERED RESPIRATORY (INHALATION)
Qty: 3 INHALER | Refills: 0 | Status: ON HOLD | OUTPATIENT
Start: 2020-11-05 | End: 2020-11-20

## 2020-11-05 RX ORDER — HYDROXYZINE HYDROCHLORIDE 50 MG/1
TABLET, FILM COATED ORAL
Qty: 180 TABLET | Refills: 0 | Status: SHIPPED | OUTPATIENT
Start: 2020-11-05 | End: 2020-11-29

## 2020-11-06 DIAGNOSIS — R60.0 BILATERAL LEG EDEMA: ICD-10-CM

## 2020-11-06 DIAGNOSIS — R11.0 NAUSEA: ICD-10-CM

## 2020-11-07 ENCOUNTER — HEALTH MAINTENANCE LETTER (OUTPATIENT)
Age: 50
End: 2020-11-07

## 2020-11-07 RX ORDER — FUROSEMIDE 20 MG
TABLET ORAL
Qty: 90 TABLET | Refills: 0 | Status: SHIPPED | OUTPATIENT
Start: 2020-11-07 | End: 2021-02-18

## 2020-11-07 RX ORDER — ONDANSETRON 8 MG/1
TABLET, FILM COATED ORAL
Qty: 30 TABLET | Refills: 1 | Status: SHIPPED | OUTPATIENT
Start: 2020-11-07 | End: 2021-01-22

## 2020-11-10 ENCOUNTER — TELEPHONE (OUTPATIENT)
Dept: CARDIOLOGY | Facility: CLINIC | Age: 50
End: 2020-11-10

## 2020-11-10 NOTE — TELEPHONE ENCOUNTER
Patient was no show for her yesterday appointment   Recommend to stop Amlodipine and reschedule her appointment

## 2020-11-10 NOTE — TELEPHONE ENCOUNTER
Dr Low, does this need to be in the clinic or virtual?. Do you want the next available appointment scheduled or to fit her in?

## 2020-11-10 NOTE — TELEPHONE ENCOUNTER
Telephoned patient to share recommendations from Dr. Chawla. She will hold her amlodipine and is waiting to hear back from her PCP's office. She stated she called to make her appt virtual yesterday and never heard back. She understands she needs to see Dr. Low to discuss further.    Jojo Jackson RN  [facility]

## 2020-11-10 NOTE — TELEPHONE ENCOUNTER
I called patient at the same time María put in this note. She is going to schedule the next in clinic appointment unless we call her back with something else.

## 2020-11-10 NOTE — TELEPHONE ENCOUNTER
Returned voicemail from patient requesting call back from nurse to discuss blood pressure.    Patient states she has noticed the last couple weeks that her BP has been low and she has been getting dizzy and lightheaded occasionally. She checked her BP yesterday morning when she felt dizzy and her BP was 76/42 and her HR 84. She takes her lisinopril and amlodipine in the evening (she states she has taken these meds for years). She reports generally feeling unwell. Reports no appetite for a couple weeks, eating soup and jello. Weight down to 210 lbs. Drinking a lot of water. She denies chest pain and shortness of breath. Home health nurse comes once and week and has also told her BP was low. I asked patient if she has talked to her PCP regarding her concerns and she stated she called the office yesterday but has not heard back from a nurse. She is very concerned about her blood pressure and that her heart may not be getting enough oxygen, as her sister had a massive heart attack and is scared this may happen to her. Appointment scheduled with Dr. Weeks for 11/16/20, but I encouraged patient to reach back out to PCP to discuss her symptoms. Will route call to Dr. Chawla as well.    Jojo Jackson RN  [facility]

## 2020-11-13 ENCOUNTER — TELEPHONE (OUTPATIENT)
Dept: INTERNAL MEDICINE | Facility: CLINIC | Age: 50
End: 2020-11-13

## 2020-11-13 NOTE — TELEPHONE ENCOUNTER
Patient states based on 11/10 visit with Cardiology she was supposed to see PCP.  Patient states she ha been waiting to hear back on when she should/can be seen.  Please advise and also were she could be worked in.

## 2020-11-13 NOTE — TELEPHONE ENCOUNTER
Telephoned patient to check on symptoms after holding amlodipine, if she made appt with Dr. Chawla. Left detailed voicemail for patient requesting call back. Direct contact information provided.    Patient returned call. She is feeling the same. BP today 93/63. She will call PCP office for appt as she still has no appetite. I told her to call me back should her PCP suggest her following up with Dr. Weeks. She verbalized understanding. Appt for 11/16/20 cancelled.    Jojo Jackson RN  [facility]

## 2020-11-16 NOTE — TELEPHONE ENCOUNTER
Routing to primary care provider to review below message. Left message for patient to call back. Per below note:    Next available appointment  Video okay

## 2020-11-16 NOTE — TELEPHONE ENCOUNTER
Patient had appointment scheduled 11/9 but was a no show. See 11/10 cardiology telephone encounter.

## 2020-11-17 ENCOUNTER — HOSPITAL ENCOUNTER (OUTPATIENT)
Dept: GENERAL RADIOLOGY | Facility: CLINIC | Age: 50
Discharge: HOME | DRG: 917 | End: 2020-11-17
Attending: OTOLARYNGOLOGY | Admitting: OTOLARYNGOLOGY
Payer: COMMERCIAL

## 2020-11-17 ENCOUNTER — HOSPITAL ENCOUNTER (OUTPATIENT)
Dept: SPEECH THERAPY | Facility: CLINIC | Age: 50
Setting detail: THERAPIES SERIES
End: 2020-11-17
Attending: OTOLARYNGOLOGY
Payer: COMMERCIAL

## 2020-11-17 DIAGNOSIS — R49.0 DYSPHONIA: ICD-10-CM

## 2020-11-17 PROCEDURE — 74230 X-RAY XM SWLNG FUNCJ C+: CPT

## 2020-11-17 PROCEDURE — 92611 MOTION FLUOROSCOPY/SWALLOW: CPT | Mod: GN | Performed by: SPEECH-LANGUAGE PATHOLOGIST

## 2020-11-17 NOTE — TELEPHONE ENCOUNTER
Patient calling still needing to be seen for her BP.   Please see notes below and advise.  Patient states she did not know she had appointment 11/09/20.

## 2020-11-17 NOTE — TELEPHONE ENCOUNTER
Call to patient. Scheduled for video visit on 12/1/20. States blood pressure has been 101-102/60-70. Patient is no longer taking Amlodipine. Patient is still taking Lisinopril 20 mg daily. Per MD note in 11/13 telephone encounter advised to decrease to 10 mg daily. Patient agrees.     FYI - Patient has appointment 11/19 with Dr. Eric to follow up on hardware in neck. States she had a video swallow and food is getting stuck on the hardware in her neck.

## 2020-11-17 NOTE — TELEPHONE ENCOUNTER
I reviewed the tel enc Nov 9 when the BP was low    How is the BP since then ?     What meds is she taking now ?    If the BP < 110,  decrease the Lisinopril to 10 mg daily    I don't see that the patient rescheduled the missed appointment

## 2020-11-17 NOTE — TELEPHONE ENCOUNTER
Call to patient. Scheduled for video visit on 12/1/20. States blood pressure has been 101-102/60-70. Patient is no longer taking Amlodipine. Patient is still taking Lisinopril 20 mg daily. Advised to decrease to 10 mg daily. Patient agrees.      FYI - Patient has appointment 11/19 with Dr. Eric to follow up on hardware in neck. States she had a video swallow and food is getting stuck on the hardware in her neck.

## 2020-11-17 NOTE — PROGRESS NOTES
"   11/17/20 1208   General Information   Type Of Visit Initial   Start Of Care Date 11/17/20   Referring Physician Lis Talbert MD   Orders   (VFSS)   Medical Diagnosis Dysphonia R49.0   Pertinent History of Current Problem/OT: Additional Occupational Profile Info SLP: Per SLP on 10/26, \"Her voice is changing: Her voice cracks and gets quiet, and it is hard to get things out. She feels a tickle on the right side below her jaw; this leads to a cough. She sometimes coughs something up that is not like regular mucus.  She states it \"freaks me out.\" She complains of food getting stuck in her lower esophagus (points to her epigastrium). She has to chew and chew her food, and it hurts when she swallows. She has reduced her smoking to about 1/4 pack/day. Her GI work-up did not show anything, but she has a follow-up in December. She was urged to see us by the GI physician. Her swallowing problems cannot be accounted for by today's examination; Dr. Talbert has recommended a swallow study, but also recommended she discuss this with her PCP, as her location of pain is not necessarily related to swallowing problems. Speech therapy to address the cough, dysphonia, and throat discomfort is strongly encouraged\" Per chart review, Esophagram was normal in 08/2020, however, to reported continued inability to eat any solid foods due to epigastric pain. EGD on 10/22/20 was normal. Pt did not recall this study and was unable to provide an accurate history of testing or recommendations. Pt unsure reason for the Video Swallow Study today, but later reported right sided neck globus sensation and coughing up chunks of clear phlegm \"every other day.\"   Respiratory Status Room air   General Observations Pt unsure of reason for testing and perseverated on epigastric pain and inability to eat   Patient/family Goals improve swallow function   VFSS Evaluation   VFSS Additional Documentation Yes   VFSS Eval: Thin Liquid Texture Trial "   Mode of Presentation, Thin Liquid cup;straw;self-fed   Preparatory Phase WFL   Oral Phase, Thin Liquid WFL   Pharyngeal Phase, Thin Liquid WFL   Rosenbek's Penetration Aspiration Scale: Thin Liquid Trial Results 1 - no aspiration, contrast does not enter airway   Diagnostic Statement WFL with single sips and serial gulping   VFSS Eval: Puree Solid Texture Trial   Mode of Presentation, Puree spoon;self-fed   Preparatory Phase WFL   Oral Phase, Puree WFL   Pharyngeal Phase, Puree WFL   Rosenbek's Penetration Aspiration Scale: Puree Food Trial Results 1 - no aspiration, contrast does not enter airway   Diagnostic Statement WFL   VFSS Eval: Solid Food Texture Trial   Mode of Presentation, Solid self-fed   Preparatory Phase WFL   Oral Phase, Solid WFL   Pharyngeal Phase, Solid WFL   Rosenbek's Penetration Aspiration Scale: Solid Food Trial Results 1 - no aspiration, contrast does not enter airway   Diagnostic Statement Slight slowing of the bolus at the area of previous cervical fusion hardware; bolus cleared with additional swallow and liquid rinse with no remaining residue   Swallow Compensations   Swallow Compensations Alternate viscosity of consistencies;Pacing;Reduce amounts   Results No difficulties noted   Educational Assessment   Barriers to Learning Cognitive   Preferred Learning Style Listening;Pictures/video   Esophageal Phase of Swallow   Patient reports or presents with symptoms of esophageal dysphagia Yes   Esophageal comments epigastric pain   Swallow Eval: Clinical Impressions   Skilled Criteria for Therapy Intervention No problems identified which require skilled intervention   Functional Assessment Scale (FAS) 7   Dysphagia Outcome Severity Scale (BONITA) Level 7 - BONITA   Treatment Diagnosis Functional oral and pharyngeal swallow   Diet texture recommendations Regular diet;Thin liquids   Recommended Feeding/Eating Techniques alternate between small bites and sips of food/liquid;hard swallow w/ each bite  or sip;maintain upright posture during/after eating for 30 mins;small sips/bites   Demonstrates Need for Referral to Another Service   (GI)   Anticipated Discharge Disposition home w/ outpatient services   Risks and Benefits of Treatment have been explained. Yes   Patient, family and/or staff in agreement with Plan of Care Yes   Clinical Impression Comments SLP: Pt presents with no appreciable dysphagia on Video Swallow Study. Thin liquids by single sips and serial gulping, puree solids and regular solids trialed. Noted slight slowing of the bolus of crackers that the C5-C6 level with previous cervical fusion (pt reported 3 years ago), however, bolus cleared easily after second swallow and liquid rinse. No oral deficits, adequate epiglottic inversion, and adequate pharyngeal constriction throughout the study. No penetration, aspiration, or pharyngeal residue noted. Images reviewed with the pt. Pt noted slowing of the bolus with the cracker and would like to speak to her orthopedic surgeon about this and reported right side neck pain. Pt instructed to follow up with PCP to review all recent testing and recommendations as pt did not recall any recent testing and was not sure who she should follow up with. Could consider OT evaluation for cognition. Recommended continued regular diet with selection of softer/moist foods and thin liquids with standard aspiration precautions and continued SLP services for voice.    Total Session Time   SLP Eval: VideoFluoroscopic Swallow function Minutes (13517) 25   Total Evaluation Time 25   Therapy Certification   Certification date from 11/17/20   Certification date to 11/17/20   Medical Diagnosis Dysphagia   Certification I certify the need for these services furnished under this plan of treatment and while under my care.  (Physician co-signature of this document indicates review and certification of the therapy plan).

## 2020-11-18 ENCOUNTER — TELEPHONE (OUTPATIENT)
Dept: INTERNAL MEDICINE | Facility: CLINIC | Age: 50
End: 2020-11-18

## 2020-11-18 ENCOUNTER — TELEPHONE (OUTPATIENT)
Dept: OTOLARYNGOLOGY | Facility: CLINIC | Age: 50
End: 2020-11-18

## 2020-11-18 DIAGNOSIS — K21.00 GASTROESOPHAGEAL REFLUX DISEASE WITH ESOPHAGITIS, UNSPECIFIED WHETHER HEMORRHAGE: Primary | ICD-10-CM

## 2020-11-18 NOTE — TELEPHONE ENCOUNTER
Faxed referral for the GI consult to [facility] in [place] to [phone] as requested.

## 2020-11-18 NOTE — TELEPHONE ENCOUNTER
Left vm for patient to relay information in regards to testing results and further recommendations. Also advised pt a follow up visit is recommended in 6 months.call back number to schedule appt provided on vm.

## 2020-11-19 DIAGNOSIS — Z20.822 ENCOUNTER FOR LABORATORY TESTING FOR COVID-19 VIRUS: Primary | ICD-10-CM

## 2020-11-19 NOTE — TELEPHONE ENCOUNTER
Patient calls, states [facility] received a referral for a colonoscopy instead of endoscopy. Please refax referral for an endoscopy.

## 2020-11-20 ENCOUNTER — HOSPITAL ENCOUNTER (INPATIENT)
Facility: CLINIC | Age: 50
LOS: 2 days | Discharge: HOME HEALTH | DRG: 917 | End: 2020-11-22
Attending: EMERGENCY MEDICINE | Admitting: INTERNAL MEDICINE
Payer: COMMERCIAL

## 2020-11-20 ENCOUNTER — TELEPHONE (OUTPATIENT)
Dept: INTERNAL MEDICINE | Facility: CLINIC | Age: 50
End: 2020-11-20

## 2020-11-20 DIAGNOSIS — L73.2 HIDRADENITIS SUPPURATIVA: ICD-10-CM

## 2020-11-20 DIAGNOSIS — F41.9 ANXIETY: ICD-10-CM

## 2020-11-20 DIAGNOSIS — T50.902A INTENTIONAL DRUG OVERDOSE, INITIAL ENCOUNTER (H): ICD-10-CM

## 2020-11-20 DIAGNOSIS — T14.91XA SUICIDE ATTEMPT (H): ICD-10-CM

## 2020-11-20 LAB
ALBUMIN SERPL-MCNC: 3.4 G/DL (ref 3.4–5)
ALBUMIN UR-MCNC: NEGATIVE MG/DL
ALP SERPL-CCNC: 180 U/L (ref 40–150)
ALT SERPL W P-5'-P-CCNC: 39 U/L (ref 0–50)
ANION GAP SERPL CALCULATED.3IONS-SCNC: 5 MMOL/L (ref 3–14)
APAP SERPL-MCNC: <2 MG/L (ref 10–20)
APPEARANCE UR: CLEAR
AST SERPL W P-5'-P-CCNC: 33 U/L (ref 0–45)
BACTERIA #/AREA URNS HPF: ABNORMAL /HPF
BASE DEFICIT BLDV-SCNC: 0.3 MMOL/L
BASOPHILS # BLD AUTO: 0 10E9/L (ref 0–0.2)
BASOPHILS NFR BLD AUTO: 0.5 %
BILIRUB SERPL-MCNC: 0.3 MG/DL (ref 0.2–1.3)
BILIRUB UR QL STRIP: NEGATIVE
BUN SERPL-MCNC: 10 MG/DL (ref 7–30)
CALCIUM SERPL-MCNC: 8.7 MG/DL (ref 8.5–10.1)
CHLORIDE SERPL-SCNC: 107 MMOL/L (ref 94–109)
CO2 SERPL-SCNC: 24 MMOL/L (ref 20–32)
COLOR UR AUTO: ABNORMAL
CREAT SERPL-MCNC: 0.91 MG/DL (ref 0.52–1.04)
DIFFERENTIAL METHOD BLD: ABNORMAL
EOSINOPHIL # BLD AUTO: 0 10E9/L (ref 0–0.7)
EOSINOPHIL NFR BLD AUTO: 0 %
ERYTHROCYTE [DISTWIDTH] IN BLOOD BY AUTOMATED COUNT: 15.7 % (ref 10–15)
ETHANOL SERPL-MCNC: 0.05 G/DL
GFR SERPL CREATININE-BSD FRML MDRD: 73 ML/MIN/{1.73_M2}
GLUCOSE BLDC GLUCOMTR-MCNC: 106 MG/DL (ref 70–99)
GLUCOSE BLDC GLUCOMTR-MCNC: 133 MG/DL (ref 70–99)
GLUCOSE BLDC GLUCOMTR-MCNC: 74 MG/DL (ref 70–99)
GLUCOSE BLDC GLUCOMTR-MCNC: 74 MG/DL (ref 70–99)
GLUCOSE SERPL-MCNC: 113 MG/DL (ref 70–99)
GLUCOSE UR STRIP-MCNC: NEGATIVE MG/DL
HCO3 BLDV-SCNC: 26 MMOL/L (ref 21–28)
HCT VFR BLD AUTO: 37.9 % (ref 35–47)
HGB BLD-MCNC: 11.6 G/DL (ref 11.7–15.7)
HGB UR QL STRIP: NEGATIVE
HYALINE CASTS #/AREA URNS LPF: 1 /LPF (ref 0–2)
IMM GRANULOCYTES # BLD: 0.1 10E9/L (ref 0–0.4)
IMM GRANULOCYTES NFR BLD: 0.9 %
INR PPP: 0.99 (ref 0.86–1.14)
INTERPRETATION ECG - MUSE: NORMAL
KETONES UR STRIP-MCNC: NEGATIVE MG/DL
LACTATE BLD-SCNC: 2 MMOL/L (ref 0.7–2)
LEUKOCYTE ESTERASE UR QL STRIP: NEGATIVE
LYMPHOCYTES # BLD AUTO: 3.3 10E9/L (ref 0.8–5.3)
LYMPHOCYTES NFR BLD AUTO: 50.5 %
MCH RBC QN AUTO: 26.5 PG (ref 26.5–33)
MCHC RBC AUTO-ENTMCNC: 30.6 G/DL (ref 31.5–36.5)
MCV RBC AUTO: 87 FL (ref 78–100)
MONOCYTES # BLD AUTO: 0.6 10E9/L (ref 0–1.3)
MONOCYTES NFR BLD AUTO: 9.1 %
MUCOUS THREADS #/AREA URNS LPF: PRESENT /LPF
NEUTROPHILS # BLD AUTO: 2.6 10E9/L (ref 1.6–8.3)
NEUTROPHILS NFR BLD AUTO: 39 %
NITRATE UR QL: NEGATIVE
NRBC # BLD AUTO: 0 10*3/UL
NRBC BLD AUTO-RTO: 0 /100
O2/TOTAL GAS SETTING VFR VENT: NORMAL %
PCO2 BLDV: 47 MM HG (ref 40–50)
PH BLDV: 7.34 PH (ref 7.32–7.43)
PH UR STRIP: 5.5 PH (ref 5–7)
PLATELET # BLD AUTO: 374 10E9/L (ref 150–450)
PO2 BLDV: 34 MM HG (ref 25–47)
POTASSIUM SERPL-SCNC: 4.3 MMOL/L (ref 3.4–5.3)
PROT SERPL-MCNC: 7.9 G/DL (ref 6.8–8.8)
RBC # BLD AUTO: 4.37 10E12/L (ref 3.8–5.2)
RBC #/AREA URNS AUTO: <1 /HPF (ref 0–2)
SALICYLATES SERPL-MCNC: 3 MG/DL
SODIUM SERPL-SCNC: 136 MMOL/L (ref 133–144)
SOURCE: ABNORMAL
SP GR UR STRIP: 1.01 (ref 1–1.03)
SQUAMOUS #/AREA URNS AUTO: <1 /HPF (ref 0–1)
UROBILINOGEN UR STRIP-MCNC: NORMAL MG/DL (ref 0–2)
WBC # BLD AUTO: 6.6 10E9/L (ref 4–11)
WBC #/AREA URNS AUTO: 1 /HPF (ref 0–5)

## 2020-11-20 PROCEDURE — 80329 ANALGESICS NON-OPIOID 1 OR 2: CPT | Performed by: EMERGENCY MEDICINE

## 2020-11-20 PROCEDURE — 81001 URINALYSIS AUTO W/SCOPE: CPT | Performed by: EMERGENCY MEDICINE

## 2020-11-20 PROCEDURE — 83605 ASSAY OF LACTIC ACID: CPT | Performed by: EMERGENCY MEDICINE

## 2020-11-20 PROCEDURE — 80320 DRUG SCREEN QUANTALCOHOLS: CPT | Performed by: EMERGENCY MEDICINE

## 2020-11-20 PROCEDURE — 258N000003 HC RX IP 258 OP 636: Performed by: INTERNAL MEDICINE

## 2020-11-20 PROCEDURE — 96360 HYDRATION IV INFUSION INIT: CPT

## 2020-11-20 PROCEDURE — 999N001017 HC STATISTIC GLUCOSE BY METER IP

## 2020-11-20 PROCEDURE — C9803 HOPD COVID-19 SPEC COLLECT: HCPCS

## 2020-11-20 PROCEDURE — 99285 EMERGENCY DEPT VISIT HI MDM: CPT | Mod: 25

## 2020-11-20 PROCEDURE — 93005 ELECTROCARDIOGRAM TRACING: CPT

## 2020-11-20 PROCEDURE — 80053 COMPREHEN METABOLIC PANEL: CPT | Performed by: EMERGENCY MEDICINE

## 2020-11-20 PROCEDURE — 85610 PROTHROMBIN TIME: CPT | Performed by: EMERGENCY MEDICINE

## 2020-11-20 PROCEDURE — 258N000003 HC RX IP 258 OP 636: Performed by: EMERGENCY MEDICINE

## 2020-11-20 PROCEDURE — 120N000001 HC R&B MED SURG/OB

## 2020-11-20 PROCEDURE — 36415 COLL VENOUS BLD VENIPUNCTURE: CPT | Performed by: EMERGENCY MEDICINE

## 2020-11-20 PROCEDURE — 85025 COMPLETE CBC W/AUTO DIFF WBC: CPT | Performed by: EMERGENCY MEDICINE

## 2020-11-20 PROCEDURE — 99223 1ST HOSP IP/OBS HIGH 75: CPT | Mod: AI | Performed by: INTERNAL MEDICINE

## 2020-11-20 PROCEDURE — U0003 INFECTIOUS AGENT DETECTION BY NUCLEIC ACID (DNA OR RNA); SEVERE ACUTE RESPIRATORY SYNDROME CORONAVIRUS 2 (SARS-COV-2) (CORONAVIRUS DISEASE [COVID-19]), AMPLIFIED PROBE TECHNIQUE, MAKING USE OF HIGH THROUGHPUT TECHNOLOGIES AS DESCRIBED BY CMS-2020-01-R: HCPCS | Performed by: EMERGENCY MEDICINE

## 2020-11-20 PROCEDURE — 82803 BLOOD GASES ANY COMBINATION: CPT | Performed by: EMERGENCY MEDICINE

## 2020-11-20 RX ORDER — NICOTINE POLACRILEX 4 MG
15-30 LOZENGE BUCCAL
Status: DISCONTINUED | OUTPATIENT
Start: 2020-11-20 | End: 2020-11-22 | Stop reason: HOSPADM

## 2020-11-20 RX ORDER — NYSTATIN 100000 [USP'U]/G
POWDER TOPICAL DAILY
COMMUNITY
End: 2021-04-16

## 2020-11-20 RX ORDER — ACETAMINOPHEN 325 MG/1
650 TABLET ORAL EVERY 4 HOURS PRN
Status: DISCONTINUED | OUTPATIENT
Start: 2020-11-20 | End: 2020-11-22 | Stop reason: HOSPADM

## 2020-11-20 RX ORDER — DESONIDE 0.5 MG/G
CREAM TOPICAL DAILY
Status: ON HOLD | COMMUNITY
End: 2021-06-17

## 2020-11-20 RX ORDER — TIOTROPIUM BROMIDE 18 UG/1
18 CAPSULE ORAL; RESPIRATORY (INHALATION)
Status: ON HOLD | COMMUNITY
End: 2020-11-21

## 2020-11-20 RX ORDER — DEXTROSE MONOHYDRATE 25 G/50ML
25-50 INJECTION, SOLUTION INTRAVENOUS
Status: DISCONTINUED | OUTPATIENT
Start: 2020-11-20 | End: 2020-11-22 | Stop reason: HOSPADM

## 2020-11-20 RX ORDER — OLANZAPINE 10 MG/2ML
5 INJECTION, POWDER, FOR SOLUTION INTRAMUSCULAR ONCE
Status: DISCONTINUED | OUTPATIENT
Start: 2020-11-20 | End: 2020-11-22 | Stop reason: HOSPADM

## 2020-11-20 RX ORDER — ALBUTEROL SULFATE 90 UG/1
2 AEROSOL, METERED RESPIRATORY (INHALATION) EVERY 6 HOURS PRN
COMMUNITY
End: 2021-05-17

## 2020-11-20 RX ORDER — BENZOYL PEROXIDE 10 G/100G
GEL TOPICAL 2 TIMES DAILY PRN
COMMUNITY
End: 2022-04-07

## 2020-11-20 RX ORDER — POLYETHYLENE GLYCOL 3350 17 G/17G
17 POWDER, FOR SOLUTION ORAL DAILY PRN
Status: DISCONTINUED | OUTPATIENT
Start: 2020-11-20 | End: 2020-11-22 | Stop reason: HOSPADM

## 2020-11-20 RX ORDER — SODIUM CHLORIDE 9 MG/ML
INJECTION, SOLUTION INTRAVENOUS CONTINUOUS
Status: DISCONTINUED | OUTPATIENT
Start: 2020-11-20 | End: 2020-11-22 | Stop reason: HOSPADM

## 2020-11-20 RX ADMIN — SODIUM CHLORIDE 1000 ML: 9 INJECTION, SOLUTION INTRAVENOUS at 06:50

## 2020-11-20 RX ADMIN — SODIUM CHLORIDE: 9 INJECTION, SOLUTION INTRAVENOUS at 21:06

## 2020-11-20 RX ADMIN — SODIUM CHLORIDE: 9 INJECTION, SOLUTION INTRAVENOUS at 08:21

## 2020-11-20 ASSESSMENT — ACTIVITIES OF DAILY LIVING (ADL)
ADLS_ACUITY_SCORE: 14
ADLS_ACUITY_SCORE: 13
ADLS_ACUITY_SCORE: 14

## 2020-11-20 NOTE — ED PROVIDER NOTES
History     Chief Complaint:  Drug Overdose      HPI  Swetha Patterson is a 50 year old female with a history of depression and anxiety who presents to the emergency department via EMS for evaluation of a drug overdose. Per EMS, patient took approximately 60 pills of either Flexeril, hydroxyzine, and clonazepam around 430am this morning. Patient also endorses drinking two glasses of wine, however EMS notes patient smells strongly of alcohol. EMS reports that patient did this due to increasing depression, however patient denied suicidal ideation for them. Clonazepam 1mg tabs quant 60 filled 11/16 is now empty. Flexeril 10mg quant 30 filled 9/27 is empty. Hydroxyzine 50mg tabs [id number] filled 11/9 has 8 tabs left.    Allergies:  Codeine  Cyclobenzaprine  Hydrocodone  Sulfa Drugs  Gabapentin    Medications:    Albuterol  Amlodipine  Atorvastatin  Bupropion  Cetirizine  Clonazepam  Flexeril  Trelegy Ellipta  Lasix  Glipizide  Hydroxyzine  Lamictal  Lisinopril  Metformin  Singulair  Nitroglycerin  Omeprazole  Lyrica  Tiotropium  Ventolin HFA    Past Medical History:    Anxiety  Asthma  Chronic pain  COPD   DIAZ   Depression   Emphysema with chronic bronchitis   GERD   HTN  Peripheral artery disease   HLD  Diabetes  Morbid obesity    Past Surgical History:    Arthroscopy shoulder decompression, left   Biopsy artery temporal, left  Bronchial thermoplasty   Appendectomy  Colonoscopy  Discectomy, fusion cervical anterior one level, combined  Polyp removal from vocal cords   Excise nose mediastinal   Laparoscopic cholecystectomy   Thoracoscopy   Tonsillectomy     Family History:    HTN  CVD  Depression  Asthma  Heart disease    Social History:  Smoking Status: Everyday Smoker  Alcohol Use: No  Drug Use: No  PCP: Roro Chawla   The patient presents to the emergency department via EMS.  Marital Status:     Review of Systems   Unable to perform ROS: Mental status change       Physical Exam     Patient  Vitals for the past 24 hrs:   BP Temp Pulse Resp SpO2   11/20/20 0900 97/62 -- 79 24 99 %   11/20/20 0830 96/62 -- 79 21 97 %   11/20/20 0806 119/80 -- 87 14 100 %   11/20/20 0745 110/63 -- 86 25 92 %   11/20/20 0715 130/67 -- 92 18 99 %   11/20/20 0700 124/88 -- 85 26 97 %   11/20/20 0645 113/80 -- 90 15 96 %   11/20/20 0626 131/88 96.9  F (36.1  C) 96 20 97 %         Physical Exam  Constitutional:       Appearance: She is well-developed.   HENT:      Head: Atraumatic.      Right Ear: External ear normal.      Left Ear: External ear normal.      Mouth/Throat:      Mouth: Mucous membranes are moist.      Pharynx: Oropharynx is clear. No oropharyngeal exudate or posterior oropharyngeal erythema.   Eyes:      General: No scleral icterus.     Extraocular Movements: Extraocular movements intact.      Conjunctiva/sclera: Conjunctivae normal.      Pupils: Pupils are equal, round, and reactive to light.   Neck:      Musculoskeletal: Normal range of motion and neck supple.      Vascular: No JVD.   Cardiovascular:      Rate and Rhythm: Regular rhythm. Tachycardia present.      Heart sounds: Normal heart sounds. No murmur. No friction rub. No gallop.    Pulmonary:      Effort: Pulmonary effort is normal. No respiratory distress.      Breath sounds: Normal breath sounds. No wheezing or rales.   Abdominal:      General: Bowel sounds are normal. There is no distension.      Palpations: Abdomen is soft. There is no mass.      Tenderness: There is no abdominal tenderness.   Musculoskeletal: Normal range of motion.   Lymphadenopathy:      Cervical: No cervical adenopathy.   Skin:     General: Skin is warm and dry.      Capillary Refill: Capillary refill takes less than 2 seconds.      Findings: No rash.   Neurological:      Mental Status: She is alert. She is disoriented.      Comments: Slurred speech. Unable to follow commands. Moving all 4 spontaneously       Emergency Department Course   EKG  Indication: Drug Overdose  Time:  6:22:42  Rate 94 bpm. DC interval 130. QRS duration 90. QT/QTc 400/500. P-R-T axes 71 75 55  Normal sinus rhythm. Prolonged QT. Abnormal ECG.   Interpreted at 630 by Yasmani De La Rosa MD.    Imaging:  Radiographic findings were communicated with the patient who voiced understanding of the findings.  No orders to display     Laboratory:  Labs Ordered and Resulted from Time of ED Arrival Up to the Time of Departure from the ED   CBC WITH PLATELETS DIFFERENTIAL - Abnormal; Notable for the following components:       Result Value    Hemoglobin 11.6 (*)     MCHC 30.6 (*)     RDW 15.7 (*)     All other components within normal limits   COMPREHENSIVE METABOLIC PANEL - Abnormal; Notable for the following components:    Glucose 113 (*)     Alkaline Phosphatase 180 (*)     All other components within normal limits   ALCOHOL ETHYL - Abnormal; Notable for the following components:    Ethanol g/dL 0.05 (*)     All other components within normal limits   ROUTINE UA WITH MICROSCOPIC REFLEX TO CULTURE - Abnormal; Notable for the following components:    Bacteria Urine Few (*)     Mucous Urine Present (*)     All other components within normal limits   GLUCOSE BY METER - Abnormal; Notable for the following components:    Glucose 133 (*)     All other components within normal limits   INR   SALICYLATE LEVEL   LACTIC ACID WHOLE BLOOD   ACETAMINOPHEN LEVEL   COVID-19 VIRUS (CORONAVIRUS) BY PCR   BLOOD GAS VENOUS   GLUCOSE MONITOR NURSING POCT   PULSE OXIMETRY NURSING   CARDIAC CONTINUOUS MONITORING   ISTAT HCG QUANTITATIVE PREGNANCY NURSING POCT   IP ACUTE INDWELLING URINARY CATHETER (MATIAS)   PERIPHERAL IV CATHETER       Interventions:  Medications   0.9% sodium chloride BOLUS (0 mLs Intravenous Stopped 11/20/20 0800)     Followed by   sodium chloride 0.9% infusion ( Intravenous New Bag 11/20/20 0821)   OLANZapine (zyPREXA) injection 5 mg (has no administration in time range)       Emergency Department Course:  Patient arrived via  ambulance.     Past medical records, nursing notes, and vitals reviewed.  0619: I performed an exam of the patient and obtained history, as documented above.     IV inserted and blood drawn.     1932: I spoke with poison control over the phone.     : I rechecked the patient. Explained findings to patient. Patient still sleepy but less slurred speech than initial exam. States she was stressed and took pills because she want to hurt herself.    Findings and plan explained to the Patient who consents to admission. Discussed the patient with Dr. Samson, who will admit the patient to an ICU bed for further monitoring, evaluation, and treatment.      Impression & Plan    Medical Decision Making:  Patient presents by EMS after drug ingestion.  Patient is somnolent at times and has slurred speech and is unable to give full history.  Her mental status and presentation with suggest highly of anticholinergic ingestion likely the hydroxyzine.  The clonazepam likely also contributed to her somnolence.  This appears to be a suicide attempt, so we put her on DANA.  She is agitated at times it does require sitter.  I spoke with poison control who recommended monitoring admission.  Due to her agitation and somnolence, I do believe currently she requires an ICU admission.  She is admitted to Dr. Samson for further monitoring.    Diagnosis:    ICD-10-CM    1. Intentional drug overdose, initial encounter (H)  T50.902A UA with Microscopic reflex to Culture   2. Suicide attempt (H)  T14.91XA        Disposition:  Patient admitted to the hospital.       Joseph Chapin  11/20/2020   EMERGENCY DEPARTMENT  Scribe Disclosure:  Koffi THRASHER, am serving as a scribe at 7:20 AM on 11/20/2020 to document services personally performed by Yasmani De La Rosa MD based on my observations and the provider's statements to me.    Scribe Disclosure:  Joseph THRASHER, am serving as a scribe at 6:23 AM on 11/20/2020 to document services personally performed  by Yasmani De La Rosa MD based on my observations and the provider's statements to me.          Yasmani De La Rosa MD  11/20/20 8650

## 2020-11-20 NOTE — ED TRIAGE NOTES
Pt presents to ED via EMS due to overdose.  Unsure of how much, but states that she took 60 pills of one of the meds.  Arrives with clonazapam, cyclobenazprine, hydroxyzine bottles.  Also alcohol use.

## 2020-11-20 NOTE — ED NOTES
Pt c/o restless legs, able to follow commands while inserting carter. Pt drowsy. VSS. 200 ml output. MD updated. Will continue to monitor.

## 2020-11-20 NOTE — PROGRESS NOTES
Psychiatric consult was attempted today but too sedated. Will try again tomorrow.  Pleaae call if you have any question [phone] (covering this weekend)

## 2020-11-20 NOTE — H&P
[facility]    History and Physical - Hospitalist Service       Date of Admission:  2020     Assessment & Plan   Swetha Patterson is a 50 year old female with a history of diabetes, depression/anxiety and COPD who is admitted to the ICU with intentional drug overdose.    Intentional overdose on clonazepam and hydroxyzine  Acute encephalopathy secondary to the above  - Patient says she took the pills in order to sleep because she was dealing with a lot of stressful situations at home where multiple family members/friends have  recently.  She denied any suicidal ideation or  desire to hurt her self, but did say she felt down in the dumps.  She is not clear on what she took, but EMS found a bottle of clonazepam quantity 60 filled on  that was empty, hydroxyzine 50 mg tabs quantity 180 filled on  with 8 tabs left.  - Poison control was contacted in the ED  - Sleepy but maintaining airway and answering most questions appropriately on presentation to ICU  Continue close monitoring in ICU till this evening, if still doing well will be appropriate for transfer to the floor.  - On DANA hold pending psychiatry consultation.     Asymptomatic COVID-19 swab is pending.    Await medicine reconciliation prior to resuming home meds for chronic conditions.     Diet: N.p.o. for now, if mental status improves can advance as tolerated  DVT Prophylaxis: Pneumatic Compression Devices  Rich Catheter: No  Code Status: Full Code    Disposition Plan   Expected discharge:   Anticipate 1-2 more days in the hospital.    Abhishek Samson MD  [facility]    ______________________________________________________________________    Chief Complaint   Overdose    History of Present Illness   Swetha Patterson is a 50 year old female with a history of depression/anxiety, diabetes, COPD, morbid obesity who presented to the ED after a drug overdose.  History was limited from the patient due to  somnolence.    History provided by ED provider and the patient.  She was able to tell me that she has been very stressed lately.  She says that multiple friends/family members have  and she does not deal with death very well.  She was very stressed out and said she just wanted to sleep and for that reason took a variety of pills.  EMS found a bottle of clonazepam quantity 60 filled on  that was empty, hydroxyzine 50 mg tabs quantity 180 filled on  with 8 tabs left.  Patient also admitted to drinking some amount of alcohol, but would not say.  I believe the patient called EMS herself.  She was taken to the ED.  In the ED, her vital signs were stable.  She appeared somnolent but was maintaining her airway appropriately.  Laboratory work-up showed an ethanol level of 0.05 and was otherwise unremarkable.  Poison control was contacted and recommended admission to the intensive care unit for close monitoring.    Review of Systems    The 10 point Review of Systems is negative other than noted in the HPI or here.     Physical Exam   /64   Pulse 71   Temp 97.8  F (36.6  C) (Axillary)   Resp 10   LMP 04/15/2016 (Exact Date)   SpO2 96%        General: Laying flat in the bed, somnolent but able to be woken up.    HEENT: No scleral icterus. Oropharynx moist.     Neck: Supple.    Pulmonary: Normal work of breathing. Clear to auscultation bilaterally.    Cardiovascular: Regular rate and rhythm without murmur or extra heart sounds.    Abdomen: Soft and non-tender.    Extremities: No peripheral edema. No clubbing or cyanosis.     Neurologic: Sleepy.  Moves all extremities.  Knew she was at [facility].    Skin: Warm and dry.    Psychiatric: Limited due to patient's delirium, but denies suicidal ideation.    Data   Data reviewed today: I have reviewed all labs and imaging results.    Recent Labs   Lab 20  0622   WBC 6.6   HGB 11.6*   MCV 87      INR 0.99      POTASSIUM 4.3   CHLORIDE 107  "  CO2 24   BUN 10   CR 0.91   ANIONGAP 5   GENE 8.7   *   ALBUMIN 3.4   PROTTOTAL 7.9   BILITOTAL 0.3   ALKPHOS 180*   ALT 39   AST 33     No results found for this or any previous visit (from the past 24 hour(s)).    Past Medical History    I have reviewed this patient's medical history and updated it with pertinent information if needed.   Past Medical History:   Diagnosis Date     Anxiety state, unspecified      Asthma      Chronic pain     back pain from cyst     Contact dermatitis and other eczema, due to unspecified cause      COPD (chronic obstructive pulmonary disease) (H)      Depressive disorder, not elsewhere classified      Diabetes (H)      Emphysema with chronic bronchitis (H)      Esophageal reflux      Family history of ischemic heart disease      Fibromyalgia      Gastro-oesophageal reflux disease      History of emphysema      Hoarseness      HTN, goal below 140/90      Hyperlipidemia LDL goal <130      Liver disease     \"fatty liver\"     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)      PONV (postoperative nausea and vomiting)      Rectal bleeding         Past Surgical History    I have reviewed this patient's surgical history and updated it with pertinent information if needed.  Past Surgical History:   Procedure Laterality Date     ARTHROSCOPY SHOULDER DECOMPRESSION Left 10/21/2015    Procedure: ARTHROSCOPY SHOULDER DECOMPRESSION;  Surgeon: Julien Milian MD;  Location: [facility] OR     BIOPSY ARTERY TEMPORAL Left 3/11/2020    Procedure: LEFT TEMPORAL ARTERY BIOPSY;  Surgeon: Ibeth Conner MD;  Location: [facility] OR     BRONCHIAL THERMOPLASTY N/A 11/14/2014    Procedure: BRONCHIAL THERMOPLASTY;  Surgeon: Ward Whitaker MD;  [facility]: [facility]     BRONCHIAL THERMOPLASTY N/A 12/19/2014    Procedure: BRONCHIAL THERMOPLASTY;  Surgeon: Ward Whitaker MD;  [facility]     BRONCHIAL THERMOPLASTY N/A 2/6/2015    Procedure: BRONCHIAL THERMOPLASTY;  Surgeon: Ward Whitaker MD;  " Location: [facility] OR     C APPENDECTOMY  at age 17     COLONOSCOPY N/A 11/26/2018    Procedure: COLONOSCOPY ([facility]);  Surgeon: Marshall Oakes MD;  Location: [facility] OR     COLONOSCOPY N/A 10/22/2020    Procedure: Colonoscopy;  Surgeon: Marshall Mccormick MD;  Location: [facility] OR     DISCECTOMY, FUSION CERVICAL ANTERIOR ONE LEVEL, COMBINED N/A 5/1/2018    Procedure: COMBINED DISCECTOMY, FUSION CERVICAL ANTERIOR ONE LEVEL;  1.  C5-C6 anterior cervical diskectomy and fusion.    2.  C5-C6 application of intervertebral biomechanical device for interbody fusion purposes.    3.  C5-C6 anterior instrumentation using the standard Kristin InViZia 24 mm plate with four associated bone screws, 12 mm in length.  Inferior screws are fixed.  Superior screws are va     ENT SURGERY  2015    polyps removed from vocal cords      ESOPHAGOSCOPY, GASTROSCOPY, DUODENOSCOPY (EGD), COMBINED N/A 10/22/2020    Procedure: Esophagoscopy, gastroscopy, duodenoscopy with biopsies;  Surgeon: Marshall Mccormick MD;  Location: [facility] OR     EXCISE NODE MEDIASTINAL  4/26/2013    Procedure: EXCISE NODE MEDIASTINAL;;  Surgeon: Av Peña MD;  Location:  OR     LAPAROSCOPIC CHOLECYSTECTOMY N/A 10/19/2017    Procedure: LAPAROSCOPIC CHOLECYSTECTOMY;  LAPAROSCOPIC CHOLECYSTECTOMY;  Surgeon: Ney Jerry MD;  Location:  OR     THORACOSCOPY  4/26/2013    Procedure: THORACOSCOPY;  LEFT VIDEO ASSISTED THORACOSCOPY, RESECTION OF POSTERIOR MEDIASTINAL MASS;  Surgeon: Av Peña MD;  Location:  OR     TONSILLECTOMY  as a kid     TONSILLECTOMY          Social History    I have reviewed this patient's social history and updated it with pertinent information if needed.  Social History     Tobacco Use     Smoking status: Current Every Day Smoker     Packs/day: 0.50     Years: 30.00     Pack years: 15.00     Types: Cigarettes     Start date: 1/1/1985     Smokeless tobacco: Never Used     Tobacco comment: Is trying to quit, using nicotine gum  and patch   Substance Use Topics     Alcohol use: No     Alcohol/week: 0.0 standard drinks     Drug use: No          Family History    I have reviewed this patient's family history and updated it with pertinent information if needed.   Family History   Problem Relation Age of Onset     Heart Disease Mother         murmur, mi     Hypertension Mother      Cerebrovascular Disease Mother      Depression Mother      Gallbladder Disease Mother      Asthma Mother      Family History Negative Father         does not know  him     Family History Negative Brother      Heart Disease Maternal Grandfather      Asthma Maternal Grandfather      Hypertension Maternal Grandfather      Cerebrovascular Disease Maternal Grandfather      Diabetes Maternal Grandfather      Asthma Sister      Hypertension Sister      Cerebrovascular Disease Sister      Hypertension Maternal Grandmother      Cerebrovascular Disease Maternal Grandmother           Prior to Admission Medications    Prior to Admission Medications   Prescriptions Last Dose Informant Patient Reported? Taking?   Fluticasone-Umeclidin-Vilanterol (TRELEGY ELLIPTA) 100-62.5-25 MCG/INH oral inhaler   Yes No   Sig: Inhale 1 puff into the lungs daily   PROAIR  (90 Base) MCG/ACT inhaler   No No   Sig: INHALE TWO PUFFS BY MOUTH EVERY 6 HOURS AS NEEDED FOR SHORTNESS OF BREATH   VENTOLIN  (90 Base) MCG/ACT inhaler   No No   Sig: Inhale 2 puffs into the lungs every 6 hours   acetaminophen (TYLENOL) 325 MG tablet   No No   Sig: Take 3 tablets (975 mg) by mouth every 6 hours as needed for pain   albuterol (PROAIR HFA/PROVENTIL HFA/VENTOLIN HFA) 108 (90 Base) MCG/ACT inhaler   No No   Sig: Inhale 2 puffs into the lungs every 6 hours as needed for shortness of breath / dyspnea   albuterol (PROAIR HFA/PROVENTIL HFA/VENTOLIN HFA) 108 (90 Base) MCG/ACT inhaler   No No   Sig: Inhale 2 puffs into the lungs every 6 hours   albuterol (PROVENTIL) (2.5 MG/3ML) 0.083% neb solution   No No    Sig: Take 1 vial (2.5 mg) by nebulization every 6 hours as needed for shortness of breath / dyspnea or wheezing   amLODIPine (NORVASC) 5 MG tablet   No No   Sig: TAKE ONE TABLET BY MOUTH EVERY DAY   atorvastatin (LIPITOR) 80 MG tablet   No No   Sig: Take 1 tablet (80 mg) by mouth daily   benzoyl peroxide 10 % EX external gel   No No   Sig: Apply topically 2 times daily   blood glucose (ACCU-CHEK ALLYSON PLUS) test strip   No No   Sig: USE TO TEST BLOOD SUGAR ONE TIME DAILY OR AS DIRECTED   blood glucose (NO BRAND SPECIFIED) lancets standard   No No   Sig: Use to test blood sugar 1 time daily   blood glucose (NO BRAND SPECIFIED) lancets standard   No No   Sig: Use to test blood sugar 1 times daily or as directed.   blood glucose (NO BRAND SPECIFIED) test strip   No No   Sig: Use to test blood sugar 1 times daily or as directed.  Dispense Accu-chek Allyson Plus or per patient insurance   blood glucose monitoring (NO BRAND SPECIFIED) meter device kit   No No   Sig: Use to test blood sugar 1 times daily or as directed.   blood glucose monitoring (NO BRAND SPECIFIED) meter device kit   No No   Sig: Use to test blood sugar 1 times daily or as directed.  Dispense Accu-chek Allyson Plus or per insurance preference   buPROPion (WELLBUTRIN XL) 150 MG 24 hr tablet   No No   Sig: TAKE ONE TABLET BY MOUTH EVERY MORNING   cetirizine HCl 10 MG CAPS   No No   Sig: Take 1 capsule (10 mg) by mouth daily as needed Takes in the spring.   clindamycin 1 % EX external gel   No No   Sig: Apply topically 2 times daily   clonazePAM (KLONOPIN) 1 MG tablet   Yes No   Sig: TAKE ONE TABLET BY MOUTH TWICE A DAY AS NEEDED FOR ANXIETY   cyclobenzaprine (FLEXERIL) 5 MG tablet   No No   Sig: Take 1 tablet (5 mg) by mouth 3 times daily as needed for muscle spasms   desonide (DESOWEN) 0.05 % external cream   No No   Sig: Apply topically as needed (rash)   fluticasone 50 MCG/ACT NA nasal spray   No No   Sig: Spray 2 sprays in nostril daily   furosemide  (LASIX) 20 MG tablet   No No   Sig: TAKE ONE TABLET BY MOUTH EVERY DAY   glipiZIDE (GLUCOTROL XL) 2.5 MG 24 hr tablet   No No   Sig: TAKE ONE TABLET BY MOUTH EVERY DAY   hydrOXYzine (ATARAX) 50 MG tablet   No No   Sig: TAKE TWO TABLETS BY MOUTH THREE TIMES A DAY   hydrocortisone 2.5 % cream   No No   Sig: Apply topically 2 times daily   ibuprofen (ADVIL/MOTRIN) 600 MG tablet   No No   Sig: Take 1 tablet (600 mg) by mouth every 6 hours as needed for pain   lamoTRIgine (LAMICTAL) 100 MG tablet   Yes No   Si mg daily    lisinopril (ZESTRIL) 20 MG tablet   No No   Sig: Take 1 tablet (20 mg) by mouth daily   metFORMIN (GLUCOPHAGE) 500 MG tablet   No No   Sig: Take 1 tablet (500 mg) by mouth daily (with breakfast)   montelukast (SINGULAIR) 10 MG tablet   No No   Sig: Take 1 tablet (10 mg) by mouth At Bedtime   nicotine (NICORETTE) 2 MG gum   No No   Sig: Place 1 each (2 mg) inside cheek as needed for smoking cessation   nicotine 14 MG/24HR TD 24 hr patch   No No   Sig: Place 1 patch onto the skin every 24 hours   nitroGLYcerin (NITROSTAT) 0.4 MG sublingual tablet   No No   Sig: FOR CHEST PAIN PLACE ONE TABLET UNDER THE TONGUE EVERY 5 MINUTES FOR 3 DOSES.  IF SYPTOMS PERSIST 5 MINUTES AFTER 1ST DOSE CALL 911   nystatin 723431 UNIT/GM EX external powder   No No   Sig: Apply topically 2 times daily as needed (skin rash)   omeprazole (PRILOSEC OTC) 20 MG EC tablet   No No   Sig: Take 1 tablet (20 mg) by mouth 2 times daily   omeprazole (PRILOSEC) 20 MG DR capsule   No No   Sig: Take 1 capsule (20 mg) by mouth 2 times daily   ondansetron (ZOFRAN) 8 MG tablet   No No   Sig: TAKE 1/2 -1 TABLET BY MOUTH EVERY 8 HOURS AS NEEDED FOR NAUSEA   order for DME   No No   Sig: Equipment being ordered: Digital home blood pressure monitor kit   order for DME   No No   Sig: Equipment being ordered: Pulse Oxymeter   order for DME   No No   Sig: Please provide oximeter   pregabalin (LYRICA) 50 MG capsule   No No   Sig: Take 1 capsule (50  mg) by mouth 3 times daily   sulfamethoxazole-trimethoprim (BACTRIM DS) 800-160 MG tablet   No No   Sig: Take 1 tablet by mouth 2 times daily Take one tablet twice daily.   tiotropium (SPIRIVA HANDIHALER) 18 MCG IN inhaled capsule   No No   Sig: Inhale contents of one capsule daily.   Patient taking differently: Inhale contents of one capsule daily PRN      Facility-Administered Medications: None        Allergies    Allergies   Allergen Reactions     Codeine Nausea and Vomiting     vomiting     Cyclobenzaprine Nausea     Hydrocodone Nausea and Vomiting     Sulfa Drugs Nausea and Vomiting     Nausea     Gabapentin Rash

## 2020-11-20 NOTE — PLAN OF CARE
VSS.  Increased orientation since admission.  No hallucinations noted.  Patient awake and orientated.  She does not want to get up or eat at this time.  She is preferring to sleep. DANA in place-sitter at bedside.  Belongings charted and placed in ICU storage.  Home medications in pharmacy.  MD aware of cyst like  wound LLQ.  Moist red groins and panus folds.  Coccyx with multiple scabs around buttocks.  Occasional non-productive cough.  Ready to transfer out of ICU.

## 2020-11-20 NOTE — PROGRESS NOTES
[facility]  Patient is currently open to home care services with [facility]. The patient is currently receiving RN services.  [facility]  and team have been notified of patient admission.  [facility] liaison will continue to follow patient during stay.  If appropriate provide orders to resume home care at time of discharge.

## 2020-11-20 NOTE — TELEPHONE ENCOUNTER
Pt took a combination of Clonipin, Hydroxizine, and Cyclobenzaprine to overdose.Pt is unsure of number of pills, but thinks over 60. Paramedics brought to [facility]. Thank you.    Raisa Subramanian RN  [phone]

## 2020-11-21 LAB
ANION GAP SERPL CALCULATED.3IONS-SCNC: 4 MMOL/L (ref 3–14)
BUN SERPL-MCNC: 13 MG/DL (ref 7–30)
CALCIUM SERPL-MCNC: 8.2 MG/DL (ref 8.5–10.1)
CHLORIDE SERPL-SCNC: 112 MMOL/L (ref 94–109)
CO2 SERPL-SCNC: 26 MMOL/L (ref 20–32)
CREAT SERPL-MCNC: 1.05 MG/DL (ref 0.52–1.04)
ERYTHROCYTE [DISTWIDTH] IN BLOOD BY AUTOMATED COUNT: 15.8 % (ref 10–15)
GFR SERPL CREATININE-BSD FRML MDRD: 61 ML/MIN/{1.73_M2}
GLUCOSE BLDC GLUCOMTR-MCNC: 90 MG/DL (ref 70–99)
GLUCOSE BLDC GLUCOMTR-MCNC: 97 MG/DL (ref 70–99)
GLUCOSE SERPL-MCNC: 101 MG/DL (ref 70–99)
HCT VFR BLD AUTO: 32.8 % (ref 35–47)
HGB BLD-MCNC: 10 G/DL (ref 11.7–15.7)
MCH RBC QN AUTO: 26.8 PG (ref 26.5–33)
MCHC RBC AUTO-ENTMCNC: 30.5 G/DL (ref 31.5–36.5)
MCV RBC AUTO: 88 FL (ref 78–100)
PLATELET # BLD AUTO: 319 10E9/L (ref 150–450)
POTASSIUM SERPL-SCNC: 4 MMOL/L (ref 3.4–5.3)
RBC # BLD AUTO: 3.73 10E12/L (ref 3.8–5.2)
SARS-COV-2 RNA SPEC QL NAA+PROBE: NOT DETECTED
SODIUM SERPL-SCNC: 142 MMOL/L (ref 133–144)
SPECIMEN SOURCE: NORMAL
WBC # BLD AUTO: 6.6 10E9/L (ref 4–11)

## 2020-11-21 PROCEDURE — 99232 SBSQ HOSP IP/OBS MODERATE 35: CPT | Performed by: INTERNAL MEDICINE

## 2020-11-21 PROCEDURE — 258N000003 HC RX IP 258 OP 636: Performed by: INTERNAL MEDICINE

## 2020-11-21 PROCEDURE — 80048 BASIC METABOLIC PNL TOTAL CA: CPT | Performed by: INTERNAL MEDICINE

## 2020-11-21 PROCEDURE — 85027 COMPLETE CBC AUTOMATED: CPT | Performed by: INTERNAL MEDICINE

## 2020-11-21 PROCEDURE — 120N000001 HC R&B MED SURG/OB

## 2020-11-21 PROCEDURE — 250N000013 HC RX MED GY IP 250 OP 250 PS 637: Performed by: INTERNAL MEDICINE

## 2020-11-21 PROCEDURE — 36415 COLL VENOUS BLD VENIPUNCTURE: CPT | Performed by: INTERNAL MEDICINE

## 2020-11-21 PROCEDURE — 999N001017 HC STATISTIC GLUCOSE BY METER IP

## 2020-11-21 PROCEDURE — 99222 1ST HOSP IP/OBS MODERATE 55: CPT | Mod: GT | Performed by: NURSE PRACTITIONER

## 2020-11-21 RX ORDER — TIOTROPIUM BROMIDE 18 UG/1
18 CAPSULE ORAL; RESPIRATORY (INHALATION) DAILY
Status: DISCONTINUED | OUTPATIENT
Start: 2020-11-21 | End: 2020-11-21

## 2020-11-21 RX ORDER — CLONAZEPAM 0.5 MG/1
1 TABLET ORAL 2 TIMES DAILY PRN
Status: DISCONTINUED | OUTPATIENT
Start: 2020-11-21 | End: 2020-11-22 | Stop reason: HOSPADM

## 2020-11-21 RX ORDER — ATORVASTATIN CALCIUM 40 MG/1
80 TABLET, FILM COATED ORAL DAILY
Status: DISCONTINUED | OUTPATIENT
Start: 2020-11-21 | End: 2020-11-22 | Stop reason: HOSPADM

## 2020-11-21 RX ORDER — LORAZEPAM 1 MG/1
1 TABLET ORAL EVERY 6 HOURS PRN
Status: DISCONTINUED | OUTPATIENT
Start: 2020-11-21 | End: 2020-11-21

## 2020-11-21 RX ORDER — BUPROPION HYDROCHLORIDE 150 MG/1
150 TABLET ORAL EVERY MORNING
Status: DISCONTINUED | OUTPATIENT
Start: 2020-11-21 | End: 2020-11-22 | Stop reason: HOSPADM

## 2020-11-21 RX ORDER — PREGABALIN 50 MG/1
50 CAPSULE ORAL 3 TIMES DAILY
Status: DISCONTINUED | OUTPATIENT
Start: 2020-11-21 | End: 2020-11-22 | Stop reason: HOSPADM

## 2020-11-21 RX ORDER — NYSTATIN 100000 [USP'U]/G
POWDER TOPICAL DAILY
Status: DISCONTINUED | OUTPATIENT
Start: 2020-11-21 | End: 2020-11-21

## 2020-11-21 RX ORDER — ALBUTEROL SULFATE 0.83 MG/ML
2.5 SOLUTION RESPIRATORY (INHALATION) EVERY 6 HOURS PRN
Status: DISCONTINUED | OUTPATIENT
Start: 2020-11-21 | End: 2020-11-22 | Stop reason: HOSPADM

## 2020-11-21 RX ORDER — FLUTICASONE PROPIONATE 50 MCG
2 SPRAY, SUSPENSION (ML) NASAL DAILY
Status: DISCONTINUED | OUTPATIENT
Start: 2020-11-21 | End: 2020-11-22 | Stop reason: HOSPADM

## 2020-11-21 RX ORDER — MONTELUKAST SODIUM 10 MG/1
10 TABLET ORAL AT BEDTIME
Status: DISCONTINUED | OUTPATIENT
Start: 2020-11-21 | End: 2020-11-22 | Stop reason: HOSPADM

## 2020-11-21 RX ORDER — LAMOTRIGINE 25 MG/1
100 TABLET ORAL DAILY
Status: DISCONTINUED | OUTPATIENT
Start: 2020-11-21 | End: 2020-11-22 | Stop reason: HOSPADM

## 2020-11-21 RX ORDER — CYCLOBENZAPRINE HCL 5 MG
5 TABLET ORAL 3 TIMES DAILY
Status: DISCONTINUED | OUTPATIENT
Start: 2020-11-21 | End: 2020-11-22 | Stop reason: HOSPADM

## 2020-11-21 RX ORDER — ACETAMINOPHEN 325 MG/1
975 TABLET ORAL EVERY 6 HOURS PRN
Status: DISCONTINUED | OUTPATIENT
Start: 2020-11-21 | End: 2020-11-21

## 2020-11-21 RX ORDER — HYDROXYZINE HYDROCHLORIDE 50 MG/1
50 TABLET, FILM COATED ORAL 3 TIMES DAILY PRN
Status: DISCONTINUED | OUTPATIENT
Start: 2020-11-21 | End: 2020-11-22 | Stop reason: HOSPADM

## 2020-11-21 RX ORDER — SULFAMETHOXAZOLE/TRIMETHOPRIM 800-160 MG
1 TABLET ORAL 2 TIMES DAILY
Status: DISCONTINUED | OUTPATIENT
Start: 2020-11-21 | End: 2020-11-22 | Stop reason: HOSPADM

## 2020-11-21 RX ADMIN — MICONAZOLE NITRATE: 20 POWDER TOPICAL at 20:48

## 2020-11-21 RX ADMIN — FLUTICASONE PROPIONATE 2 SPRAY: 50 SPRAY, METERED NASAL at 13:34

## 2020-11-21 RX ADMIN — PREGABALIN 50 MG: 50 CAPSULE ORAL at 13:31

## 2020-11-21 RX ADMIN — MONTELUKAST 10 MG: 10 TABLET, FILM COATED ORAL at 20:48

## 2020-11-21 RX ADMIN — OMEPRAZOLE 20 MG: 20 CAPSULE, DELAYED RELEASE ORAL at 13:31

## 2020-11-21 RX ADMIN — OMEPRAZOLE 20 MG: 20 CAPSULE, DELAYED RELEASE ORAL at 20:47

## 2020-11-21 RX ADMIN — HYDROXYZINE HYDROCHLORIDE 50 MG: 50 TABLET, FILM COATED ORAL at 17:06

## 2020-11-21 RX ADMIN — BUPROPION HYDROCHLORIDE 150 MG: 150 TABLET, EXTENDED RELEASE ORAL at 13:31

## 2020-11-21 RX ADMIN — LORAZEPAM 1 MG: 1 TABLET ORAL at 13:32

## 2020-11-21 RX ADMIN — CLONAZEPAM 1 MG: 0.5 TABLET ORAL at 20:47

## 2020-11-21 RX ADMIN — CYCLOBENZAPRINE HYDROCHLORIDE 5 MG: 5 TABLET, FILM COATED ORAL at 17:06

## 2020-11-21 RX ADMIN — PREGABALIN 50 MG: 50 CAPSULE ORAL at 20:47

## 2020-11-21 RX ADMIN — SULFAMETHOXAZOLE AND TRIMETHOPRIM 1 TABLET: 800; 160 TABLET ORAL at 20:47

## 2020-11-21 RX ADMIN — ATORVASTATIN CALCIUM 80 MG: 40 TABLET, FILM COATED ORAL at 13:32

## 2020-11-21 RX ADMIN — SULFAMETHOXAZOLE AND TRIMETHOPRIM 1 TABLET: 800; 160 TABLET ORAL at 13:32

## 2020-11-21 RX ADMIN — HYDROXYZINE HYDROCHLORIDE 50 MG: 50 TABLET, FILM COATED ORAL at 22:36

## 2020-11-21 RX ADMIN — CYCLOBENZAPRINE HYDROCHLORIDE 5 MG: 5 TABLET, FILM COATED ORAL at 20:48

## 2020-11-21 RX ADMIN — LAMOTRIGINE 100 MG: 25 TABLET ORAL at 13:32

## 2020-11-21 RX ADMIN — SODIUM CHLORIDE: 9 INJECTION, SOLUTION INTRAVENOUS at 05:18

## 2020-11-21 ASSESSMENT — ACTIVITIES OF DAILY LIVING (ADL)
ADLS_ACUITY_SCORE: 13
ADLS_ACUITY_SCORE: 15
ADLS_ACUITY_SCORE: 13
ADLS_ACUITY_SCORE: 13

## 2020-11-21 NOTE — PROGRESS NOTES
[facility]  Hospitalist Progress Note  Pan He MD 11/21/2020    Reason for Stay (Diagnosis): Intentional overdose         Assessment and Plan:      Summary of Stay: Swetha Patterson is a 50 year old female history of diabetes mellitus, depression, anxiety, COPD throat by ambulance to the emergency room and admitted to intensive care unit with intentional drug overdose, transferred to medical floor as she remained stable on 11/20/2020.    Problem List:   1.  Intentional overdose on clonazepam and hydroxyzine  2.  Acute toxic encephalopathy secondary to drug overdose.  3.  Grief reaction, friends and family member deaths.  -Patient evaluated by psychiatrist.  -She adamantly declined is not for suicidal attempt.  -There was concern for benzodiazepine overdose in the past as well.  -Continue to closely monitor today as Klonopin half-life is over 24 hours.  -If she remains stable we will restart her Klonopin tomorrow and consider discharging her if okay with psychiatrist.  - Poison control was contacted in the ED  -Encourage oral intake, discontinue IV fluids  -Ambulate the patient    DVT Prophylaxis: Pneumatic Compression Devices  Code Status: Full Code  Discharge Dispo: Home  Estimated Disch Date / # of Days until Disch: Likely 1 day if she continues to improve and okay with psychiatrist.        Interval History (Subjective):      Patient seen and e juan miguel parson, assumed care today, feels better, intermittently emotional, stated she lost about 5 friends and family in the last 5 months.  She stated nobody wants to address her grief reaction and her providers think this is related to anxiety and depression.  No nausea or vomiting, advancing her diet, tolerating.                  Physical Exam:      Last Vital Signs:  /78 (BP Location: Left arm)   Pulse 76   Temp 97  F (36.1  C) (Temporal)   Resp 18   Wt 103.6 kg (228 lb 6.4 oz)   LMP 04/15/2016 (Exact Date)   SpO2 95%   BMI  39.20 kg/m      I/O last 3 completed shifts:  In: 240 [P.O.:240]  Out: 2850 [Urine:2850]  Wt Readings from Last 1 Encounters:   11/21/20 103.6 kg (228 lb 6.4 oz)     Current Facility-Administered Medications   Medication     acetaminophen (TYLENOL) tablet 650 mg     albuterol (PROVENTIL) neb solution 2.5 mg     atorvastatin (LIPITOR) tablet 80 mg     buPROPion (WELLBUTRIN XL) 24 hr tablet 150 mg     glucose gel 15-30 g    Or     dextrose 50 % injection 25-50 mL    Or     glucagon injection 1 mg     fluticasone (FLONASE) 50 MCG/ACT spray 2 spray     fluticasone-vilanterol (BREO ELLIPTA) 100-25 MCG/INH inhaler 1 puff    And     umeclidinium (INCRUSE ELLIPTA) 62.5 MCG/INH inhaler 1 puff     lamoTRIgine (LaMICtal) tablet 100 mg     LORazepam (ATIVAN) tablet 1 mg     miconazole (MICATIN) 2 % powder     montelukast (SINGULAIR) tablet 10 mg     OLANZapine (zyPREXA) injection 5 mg     omeprazole (priLOSEC) CR capsule 20 mg     polyethylene glycol (MIRALAX) Packet 17 g     pregabalin (LYRICA) capsule 50 mg     sodium chloride 0.9% infusion     sulfamethoxazole-trimethoprim (BACTRIM DS) 800-160 MG per tablet 1 tablet       Constitutional: Awake, alert, cooperative, no apparent distress   Respiratory: Clear to auscultation bilaterally, no crackles or wheezing   Cardiovascular: Regular rate and rhythm, normal S1 and S2, and no murmur noted   Abdomen: Normal bowel sounds, soft, non-distended, non-tender   Skin: No rashes, no cyanosis, dry to touch   Neuro: Alert and oriented x3, no weakness, numbness, memory loss   Extremities: No edema, normal range of motion   Other(s):HEENT  Pink, nonicteric, moist oral mucosa       All other systems: Negative          Medications:      All current medications were reviewed with changes reflected in problem list.         Data:      All new lab and imaging data was reviewed.   Labs:  Recent Labs   Lab 11/21/20  0628 11/20/20  0622    136   POTASSIUM 4.0 4.3   CHLORIDE 112* 107   CO2 26  24   ANIONGAP 4 5   * 113*   BUN 13 10   CR 1.05* 0.91   GFRESTIMATED 61 73   GFRESTBLACK 71 84   GENE 8.2* 8.7     Recent Labs   Lab 11/21/20  0628 11/20/20  0622   WBC 6.6 6.6   HGB 10.0* 11.6*   HCT 32.8* 37.9   MCV 88 87    374     Recent Labs   Lab 11/21/20  0628 11/21/20  0154 11/20/20  2039 11/20/20  1620 11/20/20  1026 11/20/20  0630 11/20/20  0622   *  --   --   --   --   --  113*   BGM  --  97 74 74 106* 133*  --       Imaging:   Results for orders placed or performed during the hospital encounter of 11/17/20   XR Video Swallow with SLP or OT    Narrative    VIDEO SPEECH EVALUATION WITH OR WITHOUT ESOPHAGRAM  11/17/2020 11:26  AM     HISTORY: Dysphonia.    COMPARISON: None.    FLUOROSCOPY TIME: 0.7 minutes.    SPOT FILMS: 4    TECHNIQUE: A modified barium swallow is performed with speech  pathology. Note that only the cervical esophagus was evaluated in  lateral view.      Impression    IMPRESSION: No penetration or aspiration with any tested consistency  of barium.    Please see the speech pathology report for further details.    ERIN MONTANEZ MD     *Note: Due to a large number of results and/or encounters for the requested time period, some results have not been displayed. A complete set of results can be found in Results Review.

## 2020-11-21 NOTE — PLAN OF CARE
RN - Pt arrival from ICU at 2030. Slightly hypotensive - otherwise vitally stable. Pt denying pain. Tele SR. Pt A&Ox4. Alert to events leading hospitalization. Pt denying any current suicidal thoughts. Pt appears to be overwhelmed with the topic of recently losing multiple family/friends. Pt does remain on DANA hold - sitter in place and implemented suicide precaution in room. Pt does appear cooperative and alert at this time and educated on DANA status - pt does verbalize understanding. Diet advanced to regular - pt tolerating. Indwelling carter removed per pt request. Up with with assist of 1 - is unsteady on feet. LLQ mepilex dressings CDI - possible WOC consult if pt remains throughout the weekend. Plan pending psych consult.

## 2020-11-21 NOTE — PLAN OF CARE
Pt slept most of shift.  Pt was tearful this afternoon and requesting something for anxiety.  MD notified and ativan ordered.  Pt had a virtual psych consult this afternoon. Pt has a sitter at bed side .  Pt was requesting IV ativan. Po ordered. Pt stated that she doesn't like to swallow that size pill.  Pt stated that she always needs a couple iv doses to get the anxiety under control . I administered pts  home meds and ativan  at the same time (see Mar) and pt insisted on taking them all at once . A full cup .    The sitter was discontinued per psych but we will leave in place for awhile . pts mood has been down and up .

## 2020-11-21 NOTE — PHARMACY-ADMISSION MEDICATION HISTORY
Admission medication history interview status for this patient is complete. See [facility] admission navigator for allergy information, prior to admission medications and immunization status.     Medication history interview done via telephone during Covid-19 pandemic, indicate source(s): Patient  Medication history resources (including written lists, pill bottles, clinic record):None      Changes made to PTA medication list:  Added: none  Deleted: generic albuterol MDI, norvasc, Ibuprofen, nicotine patch, prilosec (duplicate), Pro-Air MDI  Changed: Ventolin MDI, nystatin Powder, Desonide Cream, Benzoyl Peroxide Gel    Actions taken by pharmacist (provider contacted, etc):None     Additional medication history information: NEELAM- patient said she can't use Pro-Air or other generic ALbuterol MDI, she must have Ventolin MDI only    Medication reconciliation/reorder completed by provider prior to medication history?  n   (Y/N)         Prior to Admission medications    Medication Sig Last Dose Taking? Auth Provider   albuterol (PROVENTIL) (2.5 MG/3ML) 0.083% neb solution Take 1 vial (2.5 mg) by nebulization every 6 hours as needed for shortness of breath / dyspnea or wheezing 11/19/2020 at Unknown time Yes Roro Chawla MD   atorvastatin (LIPITOR) 80 MG tablet Take 1 tablet (80 mg) by mouth daily 11/19/2020 at Unknown time Yes Roro Chawla MD   benzoyl peroxide (ACNE-CLEAR) 10 % external gel Apply topically 2 times daily as needed Past Week at Unknown time Yes Unknown, Entered By History   buPROPion (WELLBUTRIN XL) 150 MG 24 hr tablet TAKE ONE TABLET BY MOUTH EVERY MORNING Past Week at Unknown time Yes Roro Chawla MD   cetirizine HCl 10 MG CAPS Take 1 capsule (10 mg) by mouth daily as needed Takes in the spring.  Yes Roro Chawla MD   clindamycin 1 % EX external gel Apply topically 2 times daily Past Week at Unknown time Yes Roro Chawla  MD Janine   clonazePAM (KLONOPIN) 1 MG tablet TAKE ONE TABLET BY MOUTH TWICE A DAY AS NEEDED FOR ANXIETY 11/19/2020 at Unknown time Yes Reported, Patient   cyclobenzaprine (FLEXERIL) 5 MG tablet Take 1 tablet (5 mg) by mouth 3 times daily as needed for muscle spasms Past Month at Unknown time Yes Roro Chawla MD   desonide (DESOWEN) 0.05 % external cream Apply topically daily 11/19/2020 at Unknown time Yes Unknown, Entered By History   fluticasone 50 MCG/ACT NA nasal spray Spray 2 sprays in nostril daily Past Week at Unknown time Yes Roro Chawla MD   Fluticasone-Umeclidin-Vilanterol (TRELEGY ELLIPTA) 100-62.5-25 MCG/INH oral inhaler Inhale 1 puff into the lungs daily Past Week at Unknown time Yes Reported, Patient   furosemide (LASIX) 20 MG tablet TAKE ONE TABLET BY MOUTH EVERY DAY 11/19/2020 at Unknown time Yes Roro Chawla MD   glipiZIDE (GLUCOTROL XL) 2.5 MG 24 hr tablet TAKE ONE TABLET BY MOUTH EVERY DAY 11/19/2020 at Unknown time Yes Roro Chawla MD   hydrocortisone 2.5 % cream Apply topically 2 times daily 11/19/2020 at Unknown time Yes Roro Chawla MD   hydrOXYzine (ATARAX) 50 MG tablet TAKE TWO TABLETS BY MOUTH THREE TIMES A DAY 11/19/2020 at Unknown time Yes Roro Chawla MD   lamoTRIgine (LAMICTAL) 100 MG tablet 100 mg daily  11/19/2020 at Unknown time Yes Reported, Patient   lisinopril (ZESTRIL) 20 MG tablet Take 1 tablet (20 mg) by mouth daily 11/19/2020 at Unknown time Yes Roro Chawla MD   metFORMIN (GLUCOPHAGE) 500 MG tablet Take 1 tablet (500 mg) by mouth daily (with breakfast) 11/19/2020 at Unknown time Yes Roro Chawla MD   montelukast (SINGULAIR) 10 MG tablet Take 1 tablet (10 mg) by mouth At Bedtime Past Month at Unknown time Yes Roro Chawla MD   nicotine (NICORETTE) 2 MG gum Place 1 each (2 mg) inside cheek as needed for  smoking cessation  Yes Roro Chawla MD   nitroGLYcerin (NITROSTAT) 0.4 MG sublingual tablet FOR CHEST PAIN PLACE ONE TABLET UNDER THE TONGUE EVERY 5 MINUTES FOR 3 DOSES.  IF SYPTOMS PERSIST 5 MINUTES AFTER 1ST DOSE CALL 911  Yes Roro Chawla MD   nystatin (MYCOSTATIN) 391030 UNIT/GM external powder Apply topically daily 11/19/2020 at Unknown time Yes Unknown, Entered By History   omeprazole (PRILOSEC OTC) 20 MG EC tablet Take 1 tablet (20 mg) by mouth 2 times daily 11/19/2020 at Unknown time Yes Roro Chawla MD   ondansetron (ZOFRAN) 8 MG tablet TAKE 1/2 -1 TABLET BY MOUTH EVERY 8 HOURS AS NEEDED FOR NAUSEA Past Week at Unknown time Yes Roro Chawla MD   pregabalin (LYRICA) 50 MG capsule Take 1 capsule (50 mg) by mouth 3 times daily 11/19/2020 at Unknown time Yes Roro Chawla MD   sulfamethoxazole-trimethoprim (BACTRIM DS) 800-160 MG tablet Take 1 tablet by mouth 2 times daily Take one tablet twice daily. 11/19/2020 at Unknown time Yes Lamont Jordan MD   tiotropium (SPIRIVA) 18 MCG inhaled capsule Inhale 18 mcg into the lungs Past Month at Unknown time Yes Unknown, Entered By History   VENTOLIN  (90 Base) MCG/ACT inhaler Inhale 2 puffs into the lungs every 6 hours as needed for shortness of breath / dyspnea or wheezing NEELAM--Ventolin Brand only Past Month at Unknown time Yes Unknown, Entered By History   acetaminophen (TYLENOL) 325 MG tablet Take 3 tablets (975 mg) by mouth every 6 hours as needed for pain More than a month at Unknown time  Ibeth Conner MD   blood glucose (ACCU-CHEK ALLYSON PLUS) test strip USE TO TEST BLOOD SUGAR ONE TIME DAILY OR AS DIRECTED   Roro Chawla MD   blood glucose (NO BRAND SPECIFIED) lancets standard Use to test blood sugar 1 times daily or as directed.   Roro Chawla MD   blood glucose (NO BRAND SPECIFIED) lancets standard Use to  test blood sugar 1 time daily   Roro Chawla MD   blood glucose (NO BRAND SPECIFIED) test strip Use to test blood sugar 1 times daily or as directed.  Dispense Accu-chek Olinda Plus or per patient insurance   Roro Chawla MD   blood glucose monitoring (NO BRAND SPECIFIED) meter device kit Use to test blood sugar 1 times daily or as directed.  Dispense Accu-chek Olinda Plus or per insurance preference   Roro Chawla MD   blood glucose monitoring (NO BRAND SPECIFIED) meter device kit Use to test blood sugar 1 times daily or as directed.   Roro Chawla MD   order for DME Please provide oximeter   Megan Grimm MD   order for DME Equipment being ordered: Digital home blood pressure monitor kit   Roro Chawla MD   order for DME Equipment being ordered: Pulse Oxymeter   Roro Chawla MD

## 2020-11-21 NOTE — CONSULTS
"Video-Visit Details    Type of service:  Video Visit    Video Start Time (time video started): 12:30    Video End Time (time video stopped): 12:45    Originating Location (pt. Location): patient room    Distant Location (provider location):  [facility] office  Mode of Communication:  Video Conference via [employer]    Physician has received verbal consent for a Video Visit from the patient? yes    LEIGH Phan CNP      [facility], [place]   Initial Psychiatric Consult   Consult date: 2020         Reason for Consult, requesting source:    Overdose  Requesting source: Abhishek Samson        HPI:   Admitted: 20  Per H&P: \"Swetha Patterson is a 50 year old female with a history of diabetes, depression/anxiety and COPD who is admitted to the ICU with intentional drug overdose.  Intentional overdose on clonazepam and hydroxyzine  Acute encephalopathy secondary to the above  - Patient says she took the pills in order to sleep because she was dealing with a lot of stressful situations at home where multiple family members/friends have  recently.  She denied any suicidal ideation or  desire to hurt her self, but did say she felt down in the dumps.  She is not clear on what she took, but EMS found a bottle of clonazepam quantity 60 filled on  that was empty, hydroxyzine 50 mg tabs quantity 180 filled on  with 8 tabs left.  - Poison control was contacted in the ED  - Sleepy but maintaining airway and answering most questions appropriately on presentation to ICU  Continue close monitoring in ICU till this evening, if still doing well will be appropriate for transfer to the floor.  - On DANA hold pending psychiatry consultation. \"     Meds: ordered: Wellbutrin  mg daily, Lamictal 100 mg daily, Lyrica 50 mg TID,   Home meds; medical prescription plus Klonopin 1 mg BID PRN anxiety.    The patient is tearful during my " interview: feels guilty about her overdose attempt. She talks about numerous death of family, and friends, including suicide and drug overdoses.  She is eager to get home to take care of her elderly father who lives with her.  She denies current SI, says she had two glasses of champagne before taking overdose and that she usually does not drink.         Past Psychiatric History:   PSYCHIATRIC HOSPITALIZATIONS: Remote over 10 years ago per patient states it was when she was using drugs. I do not see any episode in Epic; Seen in ED 5/18/20 alcohol intox/ anxiety; notes indicate DEC assessment but I am unable to locate. She has been open to CADI services and [facility]. Behavioral Health Home services were offered earlier this year; it appears patient declined due to having other service.   PSYCHIATRIC TREATMENT/DX: depression  CURRENT PSYCHIATRIC PROVIDER: She has been followed by Milena Joseph DNP in 2016 or 2017 but no shows complicated her care. She tells me she has a new psychiatrist now.   THERAPIST: Cherelle JOHNSTON [facility] in Feb and April- she tells me she has a new therapist, does not recall name.   PSYCHOTROPIC HX/RESPONSE/ADR/ADHERENCE: lithium, doxepin, lexapro, Atarax, Xanax,   ONSET OF PSYCHIATRIC SYMPTOMS:  adulthood  RECENT STRESSORS: multiple deaths family and friends including by OD  HX SUICIDE ATTEMPTS/SIB: reason for admission, prior in 2018 by OD  SLEEP: nightmares  INTEREST/ENERGY: decreased  FOCUS/CONCENTRATION: poor  ST AND LT MEMORY: both impaired  APPETITE: adequate  FEEDING ISSUES: none identified other than overweight  MOOD HX/DEPRESSION/YOLANDA: depression, no clear Hx of yolanda  ANXIETY/PANIC: anxiety, no clear Hx of panic  OBSESSION/COMPULSIONS: denies  TRAUMA-NIGHTMARES/INTRUSIVE THOUGHTS/FLASHBACKS: past abuse and nightmares  HALLUCINATIONS: denies  DELUSIONS: denies  PARANOIA: denies  OTHER sx THOUGHT DISORDER: none    TBI/LOC:  None in record  DELIRIUM SCREEN:  "negative  HISTORY OF COMMITMENT/COURT AUTH MED: none in record.  No Hx of ECT                                                          Substance Use and History:   She tells me she usually does not drink- quit but drank before this recent overdose \"just two glasses of champagne\" . Remote Hx of excess alcohol use and crack cocaine abuse- tells me she has quit for many years.  Misuse of Klonopin- took in overdose. Denies other drugs    Smokes 1/2 PPD.          Past Medical History:   PAST MEDICAL HISTORY:   Past Medical History:   Diagnosis Date     Anxiety state, unspecified      Asthma      Chronic pain     back pain from cyst     Contact dermatitis and other eczema, due to unspecified cause      COPD (chronic obstructive pulmonary disease) (H)      Depressive disorder, not elsewhere classified      Diabetes (H)      Emphysema with chronic bronchitis (H)      Esophageal reflux      Family history of ischemic heart disease      Fibromyalgia      Gastro-oesophageal reflux disease      History of emphysema      Hoarseness      HTN, goal below 140/90      Hyperlipidemia LDL goal <130      Liver disease     \"fatty liver\"     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)      PONV (postoperative nausea and vomiting)      Rectal bleeding        PAST SURGICAL HISTORY:   Past Surgical History:   Procedure Laterality Date     ARTHROSCOPY SHOULDER DECOMPRESSION Left 10/21/2015    Procedure: ARTHROSCOPY SHOULDER DECOMPRESSION;  Surgeon: Julien Milian MD;  Location: [facility] OR     BIOPSY ARTERY TEMPORAL Left 3/11/2020    Procedure: LEFT TEMPORAL ARTERY BIOPSY;  Surgeon: Ibeth Conner MD;  Location: [facility] OR     BRONCHIAL THERMOPLASTY N/A 11/14/2014    Procedure: BRONCHIAL THERMOPLASTY;  Surgeon: Ward Whitaker MD;  [facility]: [facility]     BRONCHIAL THERMOPLASTY N/A 12/19/2014    Procedure: BRONCHIAL THERMOPLASTY;  Surgeon: Ward Whitaker MD;  [facility]     BRONCHIAL THERMOPLASTY N/A 2/6/2015    Procedure: " BRONCHIAL THERMOPLASTY;  Surgeon: Ward Whitaker MD;  [facility]: [facility]     C APPENDECTOMY  at age 17     COLONOSCOPY N/A 11/26/2018    Procedure: COLONOSCOPY ([facility]);  Surgeon: Marshall Oakes MD;  Location: [facility] OR     COLONOSCOPY N/A 10/22/2020    Procedure: Colonoscopy;  Surgeon: Marshall Mccormick MD;  Location: [facility] OR     DISCECTOMY, FUSION CERVICAL ANTERIOR ONE LEVEL, COMBINED N/A 5/1/2018    Procedure: COMBINED DISCECTOMY, FUSION CERVICAL ANTERIOR ONE LEVEL;  1.  C5-C6 anterior cervical diskectomy and fusion.    2.  C5-C6 application of intervertebral biomechanical device for interbody fusion purposes.    3.  C5-C6 anterior instrumentation using the standard Kristin InViZia 24 mm plate with four associated bone screws, 12 mm in length.  Inferior screws are fixed.  Superior screws are va     ENT SURGERY  2015    polyps removed from vocal cords      ESOPHAGOSCOPY, GASTROSCOPY, DUODENOSCOPY (EGD), COMBINED N/A 10/22/2020    Procedure: Esophagoscopy, gastroscopy, duodenoscopy with biopsies;  Surgeon: Marshall Mccormick MD;  Location:  OR     EXCISE NODE MEDIASTINAL  4/26/2013    Procedure: EXCISE NODE MEDIASTINAL;;  Surgeon: Av Peña MD;  Location:  OR     LAPAROSCOPIC CHOLECYSTECTOMY N/A 10/19/2017    Procedure: LAPAROSCOPIC CHOLECYSTECTOMY;  LAPAROSCOPIC CHOLECYSTECTOMY;  Surgeon: Ney Jerry MD;  Location:  OR     THORACOSCOPY  4/26/2013    Procedure: THORACOSCOPY;  LEFT VIDEO ASSISTED THORACOSCOPY, RESECTION OF POSTERIOR MEDIASTINAL MASS;  Surgeon: Av Peña MD;  Location:  OR     TONSILLECTOMY  as a kid     TONSILLECTOMY               Family History:   FAMILY HISTORY:   Family History   Problem Relation Age of Onset     Heart Disease Mother         murmur, mi     Hypertension Mother      Cerebrovascular Disease Mother      Depression Mother      Gallbladder Disease Mother      Asthma Mother      Family History Negative Father         does not know  him      "Family History Negative Brother      Heart Disease Maternal Grandfather      Asthma Maternal Grandfather      Hypertension Maternal Grandfather      Cerebrovascular Disease Maternal Grandfather      Diabetes Maternal Grandfather      Asthma Sister      Hypertension Sister      Cerebrovascular Disease Sister      Hypertension Maternal Grandmother      Cerebrovascular Disease Maternal Grandmother          HX OF SUICIDE IN FAMILY: yes- OD  HX OF CD/MI IN FAMILY: mother ()         Social History:   Her father lives with her and she cares for him. Close to her sister. She tells me numerous deaths of family and friends in this last year. Some overdose.  She tells me \"she just can't take it\" and became distraught and took excess meds.  She is on disability. She has CADI and in home services. Sh is single and no children. She called ex BF after right eye who  Advised her to call 911         Physical ROS:   The patient endorsed intense grief, no acute physical distress. The remainder of 10-point review of systems was negative except as noted in HPI.         Medications:     Current Facility-Administered Medications:      acetaminophen (TYLENOL) tablet 650 mg, 650 mg, Oral, Q4H PRN, Abhishek Samson MD     albuterol (PROVENTIL) neb solution 2.5 mg, 2.5 mg, Nebulization, Q6H PRN, Pan He MD     atorvastatin (LIPITOR) tablet 80 mg, 80 mg, Oral, Daily, Pan He MD     buPROPion (WELLBUTRIN XL) 24 hr tablet 150 mg, 150 mg, Oral, QAM, Pan He MD     glucose gel 15-30 g, 15-30 g, Oral, Q15 Min PRN **OR** dextrose 50 % injection 25-50 mL, 25-50 mL, Intravenous, Q15 Min PRN **OR** glucagon injection 1 mg, 1 mg, Subcutaneous, Q15 Min PRN, Abhishek Samson MD     fluticasone (FLONASE) 50 MCG/ACT spray 2 spray, 2 spray, Nasal, Daily, Pan He MD     Fluticasone-Umeclidin-Vilanterol (TRELEGY ELLIPTA) 100-62.5-25 MCG/INH oral inhaler 1 puff, 1 " puff, Inhalation, Daily, Pan He MD     lamoTRIgine (LaMICtal) tablet 100 mg, 100 mg, Oral, Daily, aPn He MD     miconazole (MICATIN) 2 % powder, , Topical, BID, Abhishek Samson MD     montelukast (SINGULAIR) tablet 10 mg, 10 mg, Oral, At Bedtime, Pan He MD     OLANZapine (zyPREXA) injection 5 mg, 5 mg, Intramuscular, Once, Abhishek Samson MD, Stopped at 11/20/20 0942     omeprazole (priLOSEC) CR capsule 20 mg, 20 mg, Oral, BID, Pan He MD     polyethylene glycol (MIRALAX) Packet 17 g, 17 g, Oral, Daily PRN, Abhishek Samson MD     pregabalin (LYRICA) capsule 50 mg, 50 mg, Oral, TID, Pan He MD     [COMPLETED] 0.9% sodium chloride BOLUS, 1,000 mL, Intravenous, Once, Stopped at 11/20/20 0800 **FOLLOWED BY** sodium chloride 0.9% infusion, , Intravenous, Continuous, Pan He MD, Last Rate: 125 mL/hr at 11/21/20 0518, New Bag at 11/21/20 0518     sulfamethoxazole-trimethoprim (BACTRIM DS) 800-160 MG per tablet 1 tablet, 1 tablet, Oral, BID, Pan He MD     tiotropium (SPIRIVA) capsule 18 mcg, 18 mcg, Inhalation, Daily, Pan He MD  Medications Prior to Admission   Medication Sig Dispense Refill Last Dose     albuterol (PROVENTIL) (2.5 MG/3ML) 0.083% neb solution Take 1 vial (2.5 mg) by nebulization every 6 hours as needed for shortness of breath / dyspnea or wheezing 25 vial 3 11/19/2020 at Unknown time     atorvastatin (LIPITOR) 80 MG tablet Take 1 tablet (80 mg) by mouth daily 90 tablet 0 11/19/2020 at Unknown time     benzoyl peroxide (ACNE-CLEAR) 10 % external gel Apply topically 2 times daily as needed   Past Week at Unknown time     buPROPion (WELLBUTRIN XL) 150 MG 24 hr tablet TAKE ONE TABLET BY MOUTH EVERY MORNING 90 tablet 0 Past Week at Unknown time     cetirizine HCl 10 MG CAPS Take 1 capsule (10 mg) by mouth daily as needed Takes in the spring.  90 capsule 3      clindamycin 1 % EX external gel Apply topically 2 times daily 60 g 3 Past Week at Unknown time     clonazePAM (KLONOPIN) 1 MG tablet TAKE ONE TABLET BY MOUTH TWICE A DAY AS NEEDED FOR ANXIETY   11/19/2020 at Unknown time     cyclobenzaprine (FLEXERIL) 5 MG tablet Take 1 tablet (5 mg) by mouth 3 times daily as needed for muscle spasms 30 tablet 1 Past Month at Unknown time     desonide (DESOWEN) 0.05 % external cream Apply topically daily   11/19/2020 at Unknown time     fluticasone 50 MCG/ACT NA nasal spray Spray 2 sprays in nostril daily 16 g 5 Past Week at Unknown time     Fluticasone-Umeclidin-Vilanterol (TRELEGY ELLIPTA) 100-62.5-25 MCG/INH oral inhaler Inhale 1 puff into the lungs daily   Past Week at Unknown time     furosemide (LASIX) 20 MG tablet TAKE ONE TABLET BY MOUTH EVERY DAY 90 tablet 0 11/19/2020 at Unknown time     glipiZIDE (GLUCOTROL XL) 2.5 MG 24 hr tablet TAKE ONE TABLET BY MOUTH EVERY DAY 30 tablet 1 11/19/2020 at Unknown time     hydrocortisone 2.5 % cream Apply topically 2 times daily 30 g 1 11/19/2020 at Unknown time     hydrOXYzine (ATARAX) 50 MG tablet TAKE TWO TABLETS BY MOUTH THREE TIMES A  tablet 0 11/19/2020 at Unknown time     lamoTRIgine (LAMICTAL) 100 MG tablet 100 mg daily    11/19/2020 at Unknown time     lisinopril (ZESTRIL) 20 MG tablet Take 1 tablet (20 mg) by mouth daily 90 tablet 0 11/19/2020 at Unknown time     metFORMIN (GLUCOPHAGE) 500 MG tablet Take 1 tablet (500 mg) by mouth daily (with breakfast) 90 tablet 0 11/19/2020 at Unknown time     montelukast (SINGULAIR) 10 MG tablet Take 1 tablet (10 mg) by mouth At Bedtime 90 tablet 4 Past Month at Unknown time     nicotine (NICORETTE) 2 MG gum Place 1 each (2 mg) inside cheek as needed for smoking cessation 150 each 1      nitroGLYcerin (NITROSTAT) 0.4 MG sublingual tablet FOR CHEST PAIN PLACE ONE TABLET UNDER THE TONGUE EVERY 5 MINUTES FOR 3 DOSES.  IF SYPTOMS PERSIST 5 MINUTES AFTER 1ST DOSE CALL 911  25 tablet 0      nystatin (MYCOSTATIN) 119513 UNIT/GM external powder Apply topically daily   11/19/2020 at Unknown time     omeprazole (PRILOSEC OTC) 20 MG EC tablet Take 1 tablet (20 mg) by mouth 2 times daily 180 tablet 1 11/19/2020 at Unknown time     ondansetron (ZOFRAN) 8 MG tablet TAKE 1/2 -1 TABLET BY MOUTH EVERY 8 HOURS AS NEEDED FOR NAUSEA 30 tablet 1 Past Week at Unknown time     pregabalin (LYRICA) 50 MG capsule Take 1 capsule (50 mg) by mouth 3 times daily 90 capsule 1 11/19/2020 at Unknown time     sulfamethoxazole-trimethoprim (BACTRIM DS) 800-160 MG tablet Take 1 tablet by mouth 2 times daily Take one tablet twice daily. 60 tablet 3 11/19/2020 at Unknown time     tiotropium (SPIRIVA) 18 MCG inhaled capsule Inhale 18 mcg into the lungs   Past Month at Unknown time     VENTOLIN  (90 Base) MCG/ACT inhaler Inhale 2 puffs into the lungs every 6 hours as needed for shortness of breath / dyspnea or wheezing NEELAM--Ventolin Brand only   Past Month at Unknown time     acetaminophen (TYLENOL) 325 MG tablet Take 3 tablets (975 mg) by mouth every 6 hours as needed for pain 50 tablet 0 More than a month at Unknown time     blood glucose (ACCU-CHEK ALLYSON PLUS) test strip USE TO TEST BLOOD SUGAR ONE TIME DAILY OR AS DIRECTED 100 each 0      blood glucose (NO BRAND SPECIFIED) lancets standard Use to test blood sugar 1 times daily or as directed. 100 each 3      blood glucose (NO BRAND SPECIFIED) lancets standard Use to test blood sugar 1 time daily 100 each 1      blood glucose (NO BRAND SPECIFIED) test strip Use to test blood sugar 1 times daily or as directed.  Dispense Accu-chek Allyson Plus or per patient insurance 100 strip 3      blood glucose monitoring (NO BRAND SPECIFIED) meter device kit Use to test blood sugar 1 times daily or as directed.  Dispense Accu-chek Allyson Plus or per insurance preference 1 kit 0      blood glucose monitoring (NO BRAND SPECIFIED) meter device kit Use to test blood sugar 1  times daily or as directed. 1 kit 0      order for DME Please provide oximeter 1 Device 0      order for DME Equipment being ordered: Digital home blood pressure monitor kit 1 Device 0      order for DME Equipment being ordered: Pulse Oxymeter 1 Device 0           Allergies:     Allergies   Allergen Reactions     Codeine Nausea and Vomiting     vomiting     Cyclobenzaprine Nausea     Hydrocodone Nausea and Vomiting     Sulfa Drugs Nausea and Vomiting     Nausea     Gabapentin Rash          Labs:     Recent Results (from the past 48 hour(s))   CBC with platelets differential    Collection Time: 11/20/20  6:22 AM   Result Value Ref Range    WBC 6.6 4.0 - 11.0 10e9/L    RBC Count 4.37 3.8 - 5.2 10e12/L    Hemoglobin 11.6 (L) 11.7 - 15.7 g/dL    Hematocrit 37.9 35.0 - 47.0 %    MCV 87 78 - 100 fl    MCH 26.5 26.5 - 33.0 pg    MCHC 30.6 (L) 31.5 - 36.5 g/dL    RDW 15.7 (H) 10.0 - 15.0 %    Platelet Count 374 150 - 450 10e9/L    Diff Method Automated Method     % Neutrophils 39.0 %    % Lymphocytes 50.5 %    % Monocytes 9.1 %    % Eosinophils 0.0 %    % Basophils 0.5 %    % Immature Granulocytes 0.9 %    Nucleated RBCs 0 0 /100    Absolute Neutrophil 2.6 1.6 - 8.3 10e9/L    Absolute Lymphocytes 3.3 0.8 - 5.3 10e9/L    Absolute Monocytes 0.6 0.0 - 1.3 10e9/L    Absolute Eosinophils 0.0 0.0 - 0.7 10e9/L    Absolute Basophils 0.0 0.0 - 0.2 10e9/L    Abs Immature Granulocytes 0.1 0 - 0.4 10e9/L    Absolute Nucleated RBC 0.0    Comprehensive metabolic panel    Collection Time: 11/20/20  6:22 AM   Result Value Ref Range    Sodium 136 133 - 144 mmol/L    Potassium 4.3 3.4 - 5.3 mmol/L    Chloride 107 94 - 109 mmol/L    Carbon Dioxide 24 20 - 32 mmol/L    Anion Gap 5 3 - 14 mmol/L    Glucose 113 (H) 70 - 99 mg/dL    Urea Nitrogen 10 7 - 30 mg/dL    Creatinine 0.91 0.52 - 1.04 mg/dL    GFR Estimate 73 >60 mL/min/[1.73_m2]    GFR Estimate If Black 84 >60 mL/min/[1.73_m2]    Calcium 8.7 8.5 - 10.1 mg/dL    Bilirubin Total 0.3 0.2 -  1.3 mg/dL    Albumin 3.4 3.4 - 5.0 g/dL    Protein Total 7.9 6.8 - 8.8 g/dL    Alkaline Phosphatase 180 (H) 40 - 150 U/L    ALT 39 0 - 50 U/L    AST 33 0 - 45 U/L   INR    Collection Time: 11/20/20  6:22 AM   Result Value Ref Range    INR 0.99 0.86 - 1.14   Alcohol ethyl    Collection Time: 11/20/20  6:22 AM   Result Value Ref Range    Ethanol g/dL 0.05 (H) <0.01 g/dL   Salicylate level    Collection Time: 11/20/20  6:22 AM   Result Value Ref Range    Salicylate Level 3 mg/dL   Lactic acid whole blood    Collection Time: 11/20/20  6:22 AM   Result Value Ref Range    Lactic Acid 2.0 0.7 - 2.0 mmol/L   Acetaminophen level    Collection Time: 11/20/20  6:22 AM   Result Value Ref Range    Acetaminophen Level <2 mg/L   Asymptomatic COVID-19 Virus (Coronavirus) by PCR    Collection Time: 11/20/20  6:22 AM    Specimen: Nasopharyngeal   Result Value Ref Range    COVID-19 Virus PCR to [facility] - Source Nasopharyngeal     COVID-19 Virus PCR to [facility] - Result Not Detected    EKG 12-lead, tracing only    Collection Time: 11/20/20  6:22 AM   Result Value Ref Range    Interpretation ECG Click View Image link to view waveform and result    Glucose by meter    Collection Time: 11/20/20  6:30 AM   Result Value Ref Range    Glucose 133 (H) 70 - 99 mg/dL   Blood gas venous    Collection Time: 11/20/20  7:00 AM   Result Value Ref Range    Ph Venous 7.34 7.32 - 7.43 pH    PCO2 Venous 47 40 - 50 mm Hg    PO2 Venous 34 25 - 47 mm Hg    Bicarbonate Venous 26 21 - 28 mmol/L    Base Deficit Venous 0.3 mmol/L    FIO2 RMA    UA with Microscopic reflex to Culture    Collection Time: 11/20/20  8:03 AM    Specimen: Catheterized Urine   Result Value Ref Range    Color Urine Light Yellow     Appearance Urine Clear     Glucose Urine Negative NEG^Negative mg/dL    Bilirubin Urine Negative NEG^Negative    Ketones Urine Negative NEG^Negative mg/dL    Specific Gravity Urine 1.015 1.003 - 1.035    Blood Urine Negative NEG^Negative    pH Urine 5.5 5.0 -  7.0 pH    Protein Albumin Urine Negative NEG^Negative mg/dL    Urobilinogen mg/dL Normal 0.0 - 2.0 mg/dL    Nitrite Urine Negative NEG^Negative    Leukocyte Esterase Urine Negative NEG^Negative    Source Catheterized Urine     WBC Urine 1 0 - 5 /HPF    RBC Urine <1 0 - 2 /HPF    Bacteria Urine Few (A) NEG^Negative /HPF    Squamous Epithelial /HPF Urine <1 0 - 1 /HPF    Mucous Urine Present (A) NEG^Negative /LPF    Hyaline Casts 1 0 - 2 /LPF   Glucose by meter    Collection Time: 11/20/20 10:26 AM   Result Value Ref Range    Glucose 106 (H) 70 - 99 mg/dL   Glucose by meter    Collection Time: 11/20/20  4:20 PM   Result Value Ref Range    Glucose 74 70 - 99 mg/dL   Glucose by meter    Collection Time: 11/20/20  8:39 PM   Result Value Ref Range    Glucose 74 70 - 99 mg/dL   Glucose by meter    Collection Time: 11/21/20  1:54 AM   Result Value Ref Range    Glucose 97 70 - 99 mg/dL   Basic metabolic panel    Collection Time: 11/21/20  6:28 AM   Result Value Ref Range    Sodium 142 133 - 144 mmol/L    Potassium 4.0 3.4 - 5.3 mmol/L    Chloride 112 (H) 94 - 109 mmol/L    Carbon Dioxide 26 20 - 32 mmol/L    Anion Gap 4 3 - 14 mmol/L    Glucose 101 (H) 70 - 99 mg/dL    Urea Nitrogen 13 7 - 30 mg/dL    Creatinine 1.05 (H) 0.52 - 1.04 mg/dL    GFR Estimate 61 >60 mL/min/[1.73_m2]    GFR Estimate If Black 71 >60 mL/min/[1.73_m2]    Calcium 8.2 (L) 8.5 - 10.1 mg/dL   CBC with platelets    Collection Time: 11/21/20  6:28 AM   Result Value Ref Range    WBC 6.6 4.0 - 11.0 10e9/L    RBC Count 3.73 (L) 3.8 - 5.2 10e12/L    Hemoglobin 10.0 (L) 11.7 - 15.7 g/dL    Hematocrit 32.8 (L) 35.0 - 47.0 %    MCV 88 78 - 100 fl    MCH 26.8 26.5 - 33.0 pg    MCHC 30.5 (L) 31.5 - 36.5 g/dL    RDW 15.8 (H) 10.0 - 15.0 %    Platelet Count 319 150 - 450 10e9/L          Physical and Psychiatric Examination:     /78 (BP Location: Left arm)   Pulse 76   Temp 97  F (36.1  C) (Temporal)   Resp 18   Wt 103.6 kg (228 lb 6.4 oz)   LMP 04/15/2016  (Exact Date)   SpO2 95%   BMI 39.20 kg/m    Weight is 228 lbs 6.4 oz  Body mass index is 39.2 kg/m .    Physical Exam:  I have reviewed the physical exam as documented by the medical team and agree with findings and assessment and have no additional findings to add at this time.    Mental Status Exam  APPEARANCE/GROOMING/ATTITUDE: tearful but cooperative  ORIENTATION: alert and oriented to person, place, date, day and situation  SPEECH: Halting, through tears  MOVEMENTS: no tics, tremors, rigidity or abnormal movements  THOUGHT PROCESS: organized, no KEYLA  THOUGHT CONTENT: denies SI, HI, AH or VH. No delusions or paranoia, Grief is intense  ATTENTION/CONCENTRATION/RECALL: fair attention, recall intact  MOOD: depressed  AFFECT:depressed  INTELLECTUAL CAPACITY: average  FUND OF KNOWLEDGE: intact  INSIGHT: fair  JUDGMENT: fair- limited follow through  IMPULSE CONTROL: some impairment but state worse if she drink as she did prior to OD    RISK ASSESSMENT: risk of self harm, but agrees to safety plan and to follow up with outpatient team         DSM-5 Diagnosis:   Major Depressive Disorder, recurrent moderate  Complicated Bereavement          Assessment:   50 year old with overwhelming grief who took overdose of Klonopin and Atarax, as described above.    She does admit that she had thought of not wanting to be alive, but then states it was that she just wanted relief from her pain for a little while. She is adamant that she is not suicidal and that she needs to get home to take care of her elderly father. She feels guilty about overdose and adding stress to family member. She tells me that she had services, including psych appt and in-home visit on Tuesday. She agrees to come to ED if SI or if she can not manage her stress.           Summary of Recommendations:   1. Discontinue 72 hour hold and 1:1 staffing  2. May discharge to home. Recommend outpatient provider Rx limited supply of medications and discontinue  benzodiazepines. This issues had been identified by previous psychiatry provider as well. Has appt on Tuesday. Agrees to return to ED if not feeling safe.   3. Requests - ordered    Please page me if you have any question [phone]

## 2020-11-22 VITALS
OXYGEN SATURATION: 95 % | BODY MASS INDEX: 39.36 KG/M2 | SYSTOLIC BLOOD PRESSURE: 103 MMHG | HEART RATE: 74 BPM | RESPIRATION RATE: 20 BRPM | TEMPERATURE: 97 F | WEIGHT: 229.3 LBS | DIASTOLIC BLOOD PRESSURE: 63 MMHG

## 2020-11-22 PROCEDURE — 250N000013 HC RX MED GY IP 250 OP 250 PS 637: Performed by: INTERNAL MEDICINE

## 2020-11-22 PROCEDURE — 99239 HOSP IP/OBS DSCHRG MGMT >30: CPT | Performed by: INTERNAL MEDICINE

## 2020-11-22 RX ORDER — CLONAZEPAM 1 MG/1
1 TABLET ORAL 2 TIMES DAILY PRN
Qty: 5 TABLET | Refills: 0 | Status: ON HOLD | OUTPATIENT
Start: 2020-11-22 | End: 2023-06-26

## 2020-11-22 RX ADMIN — BUPROPION HYDROCHLORIDE 150 MG: 150 TABLET, EXTENDED RELEASE ORAL at 08:05

## 2020-11-22 RX ADMIN — FLUTICASONE FUROATE AND VILANTEROL TRIFENATATE 1 PUFF: 100; 25 POWDER RESPIRATORY (INHALATION) at 07:55

## 2020-11-22 RX ADMIN — MICONAZOLE NITRATE: 20 POWDER TOPICAL at 08:06

## 2020-11-22 RX ADMIN — OMEPRAZOLE 20 MG: 20 CAPSULE, DELAYED RELEASE ORAL at 08:05

## 2020-11-22 RX ADMIN — UMECLIDINIUM 1 PUFF: 62.5 AEROSOL, POWDER ORAL at 07:55

## 2020-11-22 RX ADMIN — LAMOTRIGINE 100 MG: 25 TABLET ORAL at 08:05

## 2020-11-22 RX ADMIN — CYCLOBENZAPRINE HYDROCHLORIDE 5 MG: 5 TABLET, FILM COATED ORAL at 08:05

## 2020-11-22 RX ADMIN — SULFAMETHOXAZOLE AND TRIMETHOPRIM 1 TABLET: 800; 160 TABLET ORAL at 08:05

## 2020-11-22 RX ADMIN — PREGABALIN 50 MG: 50 CAPSULE ORAL at 08:05

## 2020-11-22 RX ADMIN — FLUTICASONE PROPIONATE 2 SPRAY: 50 SPRAY, METERED NASAL at 08:06

## 2020-11-22 RX ADMIN — ATORVASTATIN CALCIUM 80 MG: 40 TABLET, FILM COATED ORAL at 08:05

## 2020-11-22 ASSESSMENT — ACTIVITIES OF DAILY LIVING (ADL)
ADLS_ACUITY_SCORE: 15

## 2020-11-22 NOTE — PROGRESS NOTES
Care Management Discharge Note    Discharge Date: 11/22/20(SO/Apt)       Discharge Disposition:  home    Discharge Services:  Home care     Discharge Transportation:  sister    Education Provided on the Discharge Plan:    Patient/Family in Agreement with the Plan:  yes    Additional Information:  Patient discharging home with family. Her sister will provide transportation. Patient currently open with [facility] SN. [facility] notified patient discharging today and will need resumption of HC services.     Patient has upcoming appointments scheduled   Nov 24, 2020 11:15 AM   New Visit with Darrel Hassan MD   [facility] ([facility]) [address] [facility]   [address] [place] [address]   [phone]             Dec 01, 2020 11:00 AM   Video Visit with Roro Chawla MD   [facility] ([facility]) [address] [place] [id number] [phone]     No other f/u scheduled at this time.     Roshan Montelongo RN BSN PHN CCM   Inpatient Care Coordination   [facility]   [phone]        Roshan Montelongo RN

## 2020-11-22 NOTE — PLAN OF CARE
Reviewed discharge instructions and meds with pt, no further questions.  Pt is discharging to home and her sister is picking her up.

## 2020-11-22 NOTE — PROGRESS NOTES
"  SPIRITUAL HEALTH SERVICES Progress Note  [facility] -      Spiritual Health Services visit per consult order for emotional/spiritual support.   Provided reflective conversation to facilitate storytelling and the processing of thoughts and feelings; offered validation of feelings and affirmation.    Pt is Uats[place]/ . Prayer requested and provided.     Illness Narrative -     Described context of admission and shared that she \"called ex-boyfriend and said she was taking too many pills\" . Reported that EMT arrived to take her to hospital.       Stated that she is improving and hopes that she will be able to discharge to home, where she lives with/cares for her elderly father.     Distress -     Shared at length about multiple deaths over the last several years including her spouse, nephew (overdose) and several close friends. \"It's all just too much\".    Rukhsana talked about her anger with God and how, though she \"feels safe now\", she wonders \"why God keeps me here\".     Coping -     Named her sister, Rukhsana, who lives nearby, as being supportive and close.    Rukhsana identified her therapist and home care nurse as providing support.      Identifies as Evangelical with  spirituality.  Prayer is central to Rukhsana's ability to cope with difficult life circumstances.  \"I pray every day\".    Meaning Making-     Rukhsana shared in life review about family and friends.  We explored her love for her father, sister and cat as a path to the greater love of God. Rukhsana expressed meaning in her care of her father and wants \"to get home to him\".     Plan -  Provided website and phone number for grief support group.  Rukhsana expressed interest in attending via zoom with her sister. Pt is waiting for more information about plan of care and discharge.   Spiritual Health remain available for further support during hospital stay.         Justyn Hernandez MA  Staff   [phone]       "

## 2020-11-22 NOTE — DISCHARGE SUMMARY
[facility]  Discharge Summary  Name: Swetha Patterson    MRN: [id number]  YOB: 1970    Age: 50 year old  Date of Discharge:  11/22/2020  1:22 PM  Date of Admission: 11/20/2020  Primary Care Provider: Roro Chawla  Discharge Physician:  Pan He M.D  Discharging Service:  Hospitalist      Discharge Diagnosis:  Intentional overdose with Klonopin and hydroxyzine.  Acute toxic encephalopathy secondary to drug overdose  Grief reaction     Other Diagnosis:  Anxiety  Depression.  COPD  Obesity.  Fibromyalgia  Chronic back pain     Discharge Disposition:  Discharged to home     Allergies:  Allergies   Allergen Reactions     Codeine Nausea and Vomiting     vomiting     Cyclobenzaprine Nausea     Hydrocodone Nausea and Vomiting     Sulfa Drugs Nausea and Vomiting     Nausea     Gabapentin Rash        Discharge Medications:   Discharge Medication List as of 11/22/2020  1:01 PM      CONTINUE these medications which have CHANGED    Details   clonazePAM (KLONOPIN) 1 MG tablet Take 1 tablet (1 mg) by mouth 2 times daily as needed for anxiety She needs to see her PCP to get more tablets, Disp-5 tablet, R-0, Local Print         CONTINUE these medications which have NOT CHANGED    Details   albuterol (PROVENTIL) (2.5 MG/3ML) 0.083% neb solution Take 1 vial (2.5 mg) by nebulization every 6 hours as needed for shortness of breath / dyspnea or wheezing, Disp-25 vial, R-3, E-Prescribe      atorvastatin (LIPITOR) 80 MG tablet Take 1 tablet (80 mg) by mouth daily, Disp-90 tablet,R-0, E-Prescribe      benzoyl peroxide (ACNE-CLEAR) 10 % external gel Apply topically 2 times daily as neededHistorical      buPROPion (WELLBUTRIN XL) 150 MG 24 hr tablet TAKE ONE TABLET BY MOUTH EVERY MORNING, Disp-90 tablet,R-0, E-Prescribe      cetirizine HCl 10 MG CAPS Take 1 capsule (10 mg) by mouth daily as needed Takes in the spring., Disp-90 capsule, R-3, E-Prescribe      clindamycin 1 % EX external gel  Apply topically 2 times dailyDisp-60 g, [id number]-[id number]-Lrnaewoms      cyclobenzaprine (FLEXERIL) 5 MG tablet Take 1 tablet (5 mg) by mouth 3 times daily as needed for muscle spasms, Disp-30 tablet,R-1, E-Prescribe      desonide (DESOWEN) 0.05 % external cream Apply topically dailyHistorical      fluticasone 50 MCG/ACT NA nasal spray Spray 2 sprays in nostril daily, Disp-16 g, R-5, E-Prescribe      Fluticasone-Umeclidin-Vilanterol (TRELEGY ELLIPTA) 100-62.5-25 MCG/INH oral inhaler Inhale 1 puff into the lungs daily, Historical      furosemide (LASIX) 20 MG tablet TAKE ONE TABLET BY MOUTH EVERY DAY, Disp-90 tablet, R-0, E-Prescribe      glipiZIDE (GLUCOTROL XL) 2.5 MG 24 hr tablet TAKE ONE TABLET BY MOUTH EVERY DAY, Disp-30 tablet, R-1, E-Prescribe      hydrocortisone 2.5 % cream Apply topically 2 times dailyDisp-30 g, [id number]      hydrOXYzine (ATARAX) 50 MG tablet TAKE TWO TABLETS BY MOUTH THREE TIMES A DAY, Disp-180 tablet, R-0, E-Prescribe      lamoTRIgine (LAMICTAL) 100 MG tablet 100 mg daily , Historical      lisinopril (ZESTRIL) 20 MG tablet Take 1 tablet (20 mg) by mouth daily, Disp-90 tablet,R-0, E-Prescribe      metFORMIN (GLUCOPHAGE) 500 MG tablet Take 1 tablet (500 mg) by mouth daily (with breakfast), Disp-90 tablet,R-0, E-Prescribe      montelukast (SINGULAIR) 10 MG tablet Take 1 tablet (10 mg) by mouth At Bedtime, Disp-90 tablet,R-4, E-Prescribe      nicotine (NICORETTE) 2 MG gum Place 1 each (2 mg) inside cheek as needed for smoking cessation, Disp-150 each, R-1, E-Prescribe      nitroGLYcerin (NITROSTAT) 0.4 MG sublingual tablet FOR CHEST PAIN PLACE ONE TABLET UNDER THE TONGUE EVERY 5 MINUTES FOR 3 DOSES.  IF SYPTOMS PERSIST 5 MINUTES AFTER 1ST DOSE CALL 911, Disp-25 tablet, R-0, E-Prescribe      nystatin (MYCOSTATIN) 059068 UNIT/GM external powder Apply topically dailyHistorical      omeprazole (PRILOSEC OTC) 20 MG EC tablet Take 1 tablet (20 mg) by mouth 2 times daily, Disp-180 tablet, R-1, E-Prescribe       ondansetron (ZOFRAN) 8 MG tablet TAKE 1/2 -1 TABLET BY MOUTH EVERY 8 HOURS AS NEEDED FOR NAUSEA, Disp-30 tablet, R-1, E-Prescribe      pregabalin (LYRICA) 50 MG capsule Take 1 capsule (50 mg) by mouth 3 times daily, Disp-90 capsule, R-1, E-Prescribe      sulfamethoxazole-trimethoprim (BACTRIM DS) 800-160 MG tablet Take 1 tablet by mouth 2 times daily Take one tablet twice daily., Disp-60 tablet,R-3, E-Prescribe      VENTOLIN  (90 Base) MCG/ACT inhaler Inhale 2 puffs into the lungs every 6 hours as needed for shortness of breath / dyspnea or wheezing NEELAM--Ventolin Brand only, NEELAM, HistoricalPharmacy may dispense brand covered by insurance (Proair, or proventil or ventolin or generic albuterol inhaler)       acetaminophen (TYLENOL) 325 MG tablet Take 3 tablets (975 mg) by mouth every 6 hours as needed for pain, Disp-50 tablet,R-0, E-Prescribe      !! blood glucose (ACCU-CHEK ALLYSON PLUS) test strip USE TO TEST BLOOD SUGAR ONE TIME DAILY OR AS DIRECTED, Disp-100 each,R-0, E-Prescribe      !! blood glucose (NO BRAND SPECIFIED) lancets standard Use to test blood sugar 1 times daily or as directed.Disp-100 each,[id number]      !! blood glucose (NO BRAND SPECIFIED) lancets standard Use to test blood sugar 1 time dailyDisp-100 each,[id number]      !! blood glucose (NO BRAND SPECIFIED) test strip Use to test blood sugar 1 times daily or as directed.  Dispense Accu-chek Allyson Plus or per patient insurance, Disp-100 strip,R-3, E-Prescribe      !! blood glucose monitoring (NO BRAND SPECIFIED) meter device kit Use to test blood sugar 1 times daily or as directed.  Dispense Accu-chek Allyson Plus or per insurance preferenceDisp-1 kit,[id number]-7S-Amoexfich      !! blood glucose monitoring (NO BRAND SPECIFIED) meter device kit Use to test blood sugar 1 times daily or as directed.Disp-1 kit,L-0N-Sypebuozd      !! order for DME Please provide oximeterDisp-1 Device,R-0, Local Print      !! order for DME Equipment being  ordered: Digital home blood pressure monitor kitDisp-1 Device, R-0, Local Print      !! order for DME Equipment being ordered: Pulse OxymeterDisp-1 Device, R-0, Local Print       !! - Potential duplicate medications found. Please discuss with provider.           Condition on Discharge:  Discharge condition: Stable   Discharge vitals: Blood pressure 103/63, pulse 74, temperature 97  F (36.1  C), resp. rate 20, weight 104 kg (229 lb 4.8 oz), last menstrual period 04/15/2016, SpO2 95 %, not currently breastfeeding.   Code status on discharge: Full Code     History of Illness:  See detailed admission note for full details.    Significant Physical Exam Findings:  Constitutional: Awake, alert, cooperative, no apparent distress   Respiratory: Clear to auscultation bilaterally, no crackles or wheezing   Cardiovascular: Regular rate and rhythm, normal S1 and S2, and no murmur noted   Abdomen: Normal bowel sounds, soft, non-distended, non-tender   Skin: No rashes, no cyanosis, dry to touch   Neuro: Alert and oriented x3, no weakness, numbness, memory loss   Extremities: No edema, normal range of motion   Other(s):HEENT  Pink, nonicteric, moist oral mucosa         All other systems: Negative       Procedures other than Imaging:  None     Imaging:  Results for orders placed or performed during the hospital encounter of 11/17/20   XR Video Swallow with SLP or OT    Narrative    VIDEO SPEECH EVALUATION WITH OR WITHOUT ESOPHAGRAM  11/17/2020 11:26  AM     HISTORY: Dysphonia.    COMPARISON: None.    FLUOROSCOPY TIME: 0.7 minutes.    SPOT FILMS: 4    TECHNIQUE: A modified barium swallow is performed with speech  pathology. Note that only the cervical esophagus was evaluated in  lateral view.      Impression    IMPRESSION: No penetration or aspiration with any tested consistency  of barium.    Please see the speech pathology report for further details.    ERIN MONTANEZ MD     *Note: Due to a large number of results and/or  encounters for the requested time period, some results have not been displayed. A complete set of results can be found in Results Review.        Consultations:  Consultation during this admission received from psychiatrist.     Recent Lab Results:  Recent Labs   Lab 11/21/20  0628 11/20/20  0622   WBC 6.6 6.6   HGB 10.0* 11.6*   HCT 32.8* 37.9   MCV 88 87    374     Recent Labs   Lab 11/21/20  0628 11/20/20  0622    136   POTASSIUM 4.0 4.3   CHLORIDE 112* 107   CO2 26 24   ANIONGAP 4 5   * 113*   BUN 13 10   CR 1.05* 0.91   GFRESTIMATED 61 73   GFRESTBLACK 71 84   GENE 8.2* 8.7     Recent Labs   Lab 11/21/20  1746 11/21/20  0628 11/21/20  0154 11/20/20  2039 11/20/20  1620 11/20/20  1026 11/20/20  0622 11/20/20  0622   GLC  --  101*  --   --   --   --   --  113*   BGM 90  --  97 74 74 106*   < >  --     < > = values in this interval not displayed.          Pending Results:    Unresulted Labs Ordered in the Past 30 Days of this Admission     No orders found from 10/21/2020 to 11/21/2020.              Home care nursing referral      Reason for your hospital stay    Intentional overdose     Follow-up and recommended labs and tests     Follow up with primary care provider, Roro Chawla, 1-2 days for hospital follow- up.  Follow up with Psychiatrist in 2-3 days     Activity    Your activity upon discharge: activity as tolerated     Full Code     Diet    Follow this diet upon discharge: Orders Placed This Encounter      Regular Diet Adult       Hospital Course:  Swetha Patterson is a 50 year old female history of past medical history significant for dm, depression, anxiety, COPD, brought in by ambulance to the emergency room and admitted to intensive care unit with intentional drug overdose.  She was evaluated in the ICU, remained stable and transferred to medical floor on 11/20/2020.   She stated she has 3 reaction as she lost 5 of her friends and family members since May 2020 due to  many reasons.  She stated nobody understands her grief reaction and everybody calls it depression and anxiety.  She is frustrated and she took clonazepam and also hydroxyzine, found to have a change in mental status and admitted to the hospital.  After admission she remained stable.  She was evaluated by psychiatrist who recommended that she can be discharged back home.  Patient stated she did not have her Klonopin anymore and asking for prescription.  I discussed with her regarding this, and she was given only 5 tablets to take as needed every 12 hours for severe anxiety.  She was advised to use hydroxyzine for mild to moderate anxiety.  She will discuss with her primary care provider and psychiatrist to get refill for this medication.  It was noted that she just refilled her medication days ago.  She had toxic encephalopathy due to drug overdose, she was on IV fluids, the effects of the drug cleared, patient is back to her baseline.  There was also noted that poison control was contacted in the emergency room.  I discussed with patient the plan of discharge and follow-up.  Patient has home health care RN to arrange her medications and it was resumed.     Total time spent in face to face contact with the patient and coordinating discharge was:  >30 Minutes.

## 2020-11-22 NOTE — PROGRESS NOTES
VSS. Pt alert and oriented x 4, anxious. Up independently. Sitter pulled at 1900. Anxiety meds reordered per MD. Pt took shower. Ambulated in verma x 1. PRN atarax given x 1. No PIV access. Discharge planned tomorrow.

## 2020-11-22 NOTE — PLAN OF CARE
Code status: FULL   COVID-19 result: Negative     Pertinent assessment: A&Ox4. VSS. Tele: SR w/ occasional PVCs. C/o generalized pain r/t fibromyalgia. LS: clear, diminished. GI/: WDL. Skin: Blanchable redness under breasts and under panus. Miconazole powder applied. No IV access. Up SBA w/ GB. Regular diet. PRN klonopin and atarax given for anxiety.      Treatment plan: Encourage fluids. Psych following. See notes. Continue with POC  Discharge dispo: Today

## 2020-11-23 ENCOUNTER — TELEPHONE (OUTPATIENT)
Dept: INTERNAL MEDICINE | Facility: CLINIC | Age: 50
End: 2020-11-23

## 2020-11-23 DIAGNOSIS — R05.9 COUGH: Primary | ICD-10-CM

## 2020-11-23 RX ORDER — AZITHROMYCIN 250 MG/1
TABLET, FILM COATED ORAL
Qty: 6 TABLET | Refills: 0 | Status: SHIPPED | OUTPATIENT
Start: 2020-11-23 | End: 2021-02-05

## 2020-11-23 NOTE — TELEPHONE ENCOUNTER
Patient discharged home yesterday and homecare will resume services. The plan is to see her Wednesday as she has an MD appointment on 11/24.

## 2020-11-23 NOTE — TELEPHONE ENCOUNTER
Patient calls coughing constantly stating that she has an URI again.  Patient states she was tested a week ago for COVID-19 and it was negative.  No known exposure, no body aches or pain, no fever, no loss of taste or smell, no sore throat.  Patient states it just started yesterday and she is coughing up green.  Patient is asking for a Z-Saurabh.  Please advise.

## 2020-11-23 NOTE — TELEPHONE ENCOUNTER
sulfamethoxazole-trimethoprim (BACTRIM DS) 800-160 MG tablet      Last Written Prescription Date:  7-2-20  Last Fill Quantity: 60,   # refills: 3  Last Office Visit : 7-2-20 ( RTC 3 M)  Future Office visit:  none    Routing refill request to provider for review/approval because:  Med not on protocol    Scheduling has been notified to contact the pt for appointment.

## 2020-11-25 ENCOUNTER — TELEPHONE (OUTPATIENT)
Dept: DERMATOLOGY | Facility: CLINIC | Age: 50
End: 2020-11-25

## 2020-11-25 ENCOUNTER — TELEPHONE (OUTPATIENT)
Dept: INTERNAL MEDICINE | Facility: CLINIC | Age: 50
End: 2020-11-25

## 2020-11-25 NOTE — TELEPHONE ENCOUNTER
Central Prior Authorization Team  Phone: [phone]    PA Initiation    Medication: Ventolin NEELAM  Insurance Company: [employer] - Phone [phone] Fax [phone]  Pharmacy Filling the Rx:    Filling Pharmacy Phone:    Filling Pharmacy Fax:    Start Date: 11/25/2020

## 2020-11-25 NOTE — TELEPHONE ENCOUNTER
Prior Authorization Approval    Authorization Effective Date: 10/26/2020  Authorization Expiration Date: 11/25/2021  Medication: Ventolin NEELAM- APPROVED   Approved Dose/Quantity:   Reference #:     Insurance Company: [employer] - Phone [phone] Fax [phone]  Expected CoPay:       CoPay Card Available:      Foundation Assistance Needed:    Which Pharmacy is filling the prescription (Not needed for infusion/clinic administered):    Pharmacy Notified: NoComment:  Not listed  Patient Notified: YesComment:  Left message

## 2020-11-25 NOTE — TELEPHONE ENCOUNTER
Patient requests Ventolin inhaler PA to be done as her insurance will not cover Ventolin and this is the only inhaler that has worked for her.  Proventil, Albuterol, Proair have not worked for her.  Must be Ventolin.  SERJIO Chandra R.N.

## 2020-11-27 ENCOUNTER — TELEPHONE (OUTPATIENT)
Dept: INTERNAL MEDICINE | Facility: CLINIC | Age: 50
End: 2020-11-27

## 2020-11-27 DIAGNOSIS — F41.9 ANXIETY: ICD-10-CM

## 2020-11-27 RX ORDER — SULFAMETHOXAZOLE/TRIMETHOPRIM 800-160 MG
1 TABLET ORAL 2 TIMES DAILY
Qty: 60 TABLET | Refills: 0 | Status: SHIPPED | OUTPATIENT
Start: 2020-11-27 | End: 2021-01-28

## 2020-11-27 NOTE — TELEPHONE ENCOUNTER
Raisa from [facility] calling for verbal orders for resumption of care. Call Raisa at [phone]

## 2020-11-27 NOTE — TELEPHONE ENCOUNTER
Received refill request for bactrim for HS as resident on call. Patient chart reviewed. Appears patient has missed recent appointments and is due to follow-up. Refill approved for 1 month until she can be seen by Dr. Jordan

## 2020-11-29 RX ORDER — HYDROXYZINE HYDROCHLORIDE 50 MG/1
TABLET, FILM COATED ORAL
Qty: 70 TABLET | Refills: 5 | Status: SHIPPED | OUTPATIENT
Start: 2020-11-29 | End: 2021-02-18

## 2020-11-30 NOTE — TELEPHONE ENCOUNTER
Raisa calls re: orders.  Patient attempetd suicide and is in need of Homecare as soon as possible.

## 2020-12-01 ENCOUNTER — VIRTUAL VISIT (OUTPATIENT)
Dept: INTERNAL MEDICINE | Facility: CLINIC | Age: 50
End: 2020-12-01
Payer: COMMERCIAL

## 2020-12-01 ENCOUNTER — TELEPHONE (OUTPATIENT)
Dept: INTERNAL MEDICINE | Facility: CLINIC | Age: 50
End: 2020-12-01

## 2020-12-01 DIAGNOSIS — R13.10 DYSPHAGIA, UNSPECIFIED TYPE: ICD-10-CM

## 2020-12-01 DIAGNOSIS — Z09 HOSPITAL DISCHARGE FOLLOW-UP: Primary | ICD-10-CM

## 2020-12-01 DIAGNOSIS — K21.00 GASTROESOPHAGEAL REFLUX DISEASE WITH ESOPHAGITIS, UNSPECIFIED WHETHER HEMORRHAGE: ICD-10-CM

## 2020-12-01 DIAGNOSIS — I10 HTN, GOAL BELOW 140/90: ICD-10-CM

## 2020-12-01 PROCEDURE — 99495 TRANSJ CARE MGMT MOD F2F 14D: CPT | Performed by: INTERNAL MEDICINE

## 2020-12-01 NOTE — PROGRESS NOTES
"Swetha Patterson is a 50 year old female who is being evaluated via a billable video visit.      The patient has been notified of following:     \"This video visit will be conducted via a call between you and your physician/provider. We have found that certain health care needs can be provided without the need for an in-person physical exam.  This service lets us provide the care you need with a video conversation.  If a prescription is necessary we can send it directly to your pharmacy.  If lab work is needed we can place an order for that and you can then stop by our lab to have the test done at a later time.    Video visits are billed at different rates depending on your insurance coverage.  Please reach out to your insurance provider with any questions.    If during the course of the call the physician/provider feels a video visit is not appropriate, you will not be charged for this service.\"    Patient has given verbal consent for Video visit? Yes  How would you like to obtain your AVS? MyChart  If you are dropped from the video visit, the video invite should be resent to: Text to cell phone: [phone]  Will anyone else be joining your video visit? No          This is a VIDEO ( using Doximity)  encounter with the patient.       Location of the provider : home   Location of the patient : home        11:53 --- 12:19         Dr Low's note      Patient's instructions / PLAN:                                                        Plan:  1. [facility] appointment [facility] phone: [phone]  2. Call the psychiatrist - for sooner appointment if possibble  3. No change in meds         ASSESSMENT & PLAN:                                                      (Z09) Hospital discharge follow-up  (primary encounter diagnosis)  Comment:   Plan:     (K21.00) Gastroesophageal reflux disease with esophagitis, unspecified whether hemorrhage  Comment: Not controlled   Plan: GASTROENTEROLOGY ADULT REF CONSULT " "ONLY            (R13.10) Dysphagia, unspecified type  Comment:   Plan: GASTROENTEROLOGY ADULT REF CONSULT ONLY            (I10) HTN, goal below 140/90  Comment: Controlled    Plan: Continue same meds, same doses for now        Chief complaint:                                                      hosp f/u     SUBJECTIVE:                                                    History of present illness:      Anxiety  -- Not controlled   -- family with health prob ( brother in law with massive stroke)   -- lost 5 people closed to her since May   -- she has a psychiatrist     Swallowing prob  -- she understood that the food gets stuck in the neck because of \" a wire from the neck from the surgery, but then she c/o that the pain is at the bottom on the esophagus.   -- I reviewed the chart   -- I see a message in the chart that dr Talbert's nurse left a message for Rukhsana, but I see no documentation about the message content I advised Rukhsana to call dr Talbert.   -- EGD - normal     HTN  -- /56 - 110/60    DcDx  Discharge Diagnosis:  Intentional overdose with Klonopin and hydroxyzine.  Acute toxic encephalopathy secondary to drug overdose  Grief reaction             Hyperlipidemia Follow-Up      Are you regularly taking any medication or supplement to lower your cholesterol?   Yes- lipitor    Are you having muscle aches or other side effects that you think could be caused by your cholesterol lowering medication?  No    Hypertension Follow-up      Do you check your blood pressure regularly outside of the clinic? Yes     Are you following a low salt diet? Yes    Are your blood pressures ever more than 140 on the top number (systolic) OR more   than 90 on the bottom number (diastolic), for example 140/90? No      How many servings of fruits and vegetables do you eat daily?  2-3    On average, how many sweetened beverages do you drink each day (Examples: soda, juice, sweet tea, etc.  Do NOT count diet or artificially sweetened " "beverages)?   0    How many days per week do you exercise enough to make your heart beat faster? 3 or less    How many minutes a day do you exercise enough to make your heart beat faster? 10 - 19    How many days per week do you miss taking your medication? 0         Review of Systems:                                                      ROS: negative for fever, chills, cough, wheezes, chest pain, shortness of breath, vomiting, abdominal pain, leg swelling     A 10-point review of systems was obtained.  Those pertinent are above and in the in the Subjective section.  The rest of the systems are negative.           OBJECTIVE:           An actual physical exam can't be done during phone visit   A limited exam can sometimes be performed by video visit   Good eye contact. Speech with normal volume, pattern, tone. Thought process seems coherent, logical. Standard dressed appearance. Mood anxious       PMHx: reviewed  Past Medical History:   Diagnosis Date     Anxiety state, unspecified      Asthma      Chronic pain     back pain from cyst     Contact dermatitis and other eczema, due to unspecified cause      COPD (chronic obstructive pulmonary disease) (H)      Depressive disorder, not elsewhere classified      Diabetes (H)      Emphysema with chronic bronchitis (H)      Esophageal reflux      Family history of ischemic heart disease      Fibromyalgia      Gastro-oesophageal reflux disease      History of emphysema      Hoarseness      HTN, goal below 140/90      Hyperlipidemia LDL goal <130      Liver disease     \"fatty liver\"     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)      PONV (postoperative nausea and vomiting)      Rectal bleeding       PSHx: reviewed  Past Surgical History:   Procedure Laterality Date     ARTHROSCOPY SHOULDER DECOMPRESSION Left 10/21/2015    Procedure: ARTHROSCOPY SHOULDER DECOMPRESSION;  Surgeon: Julien Milian MD;  Location: [facility]     BIOPSY ARTERY TEMPORAL Left 3/11/2020    Procedure: " LEFT TEMPORAL ARTERY BIOPSY;  Surgeon: Ibeth Conner MD;  Location: [facility]     BRONCHIAL THERMOPLASTY N/A 11/14/2014    Procedure: BRONCHIAL THERMOPLASTY;  Surgeon: Ward Whitaker MD;  [facility]     BRONCHIAL THERMOPLASTY N/A 12/19/2014    Procedure: BRONCHIAL THERMOPLASTY;  Surgeon: Ward Whitaker MD;  [facility]     BRONCHIAL THERMOPLASTY N/A 2/6/2015    Procedure: BRONCHIAL THERMOPLASTY;  Surgeon: Ward Whitaker MD;  [facility]     C APPENDECTOMY  at age 17     COLONOSCOPY N/A 11/26/2018    Procedure: COLONOSCOPY ([facility]);  Surgeon: Marshall Oakes MD;  Location: [facility] OR     COLONOSCOPY N/A 10/22/2020    Procedure: Colonoscopy;  Surgeon: Marshall Mccormick MD;  Location: [facility] OR     DISCECTOMY, FUSION CERVICAL ANTERIOR ONE LEVEL, COMBINED N/A 5/1/2018    Procedure: COMBINED DISCECTOMY, FUSION CERVICAL ANTERIOR ONE LEVEL;  1.  C5-C6 anterior cervical diskectomy and fusion.    2.  C5-C6 application of intervertebral biomechanical device for interbody fusion purposes.    3.  C5-C6 anterior instrumentation using the standard Kristin InViZia 24 mm plate with four associated bone screws, 12 mm in length.  Inferior screws are fixed.  Superior screws are va     ENT SURGERY  2015    polyps removed from vocal cords      ESOPHAGOSCOPY, GASTROSCOPY, DUODENOSCOPY (EGD), COMBINED N/A 10/22/2020    Procedure: Esophagoscopy, gastroscopy, duodenoscopy with biopsies;  Surgeon: Marshall Mccormick MD;  Location: [facility]     EXCISE NODE MEDIASTINAL  4/26/2013    Procedure: EXCISE NODE MEDIASTINAL;;  Surgeon: Av Peña MD;  Location: [facility]     LAPAROSCOPIC CHOLECYSTECTOMY N/A 10/19/2017    Procedure: LAPAROSCOPIC CHOLECYSTECTOMY;  LAPAROSCOPIC CHOLECYSTECTOMY;  Surgeon: Ney Jerry MD;  Location: [facility]     THORACOSCOPY  4/26/2013    Procedure: THORACOSCOPY;  LEFT VIDEO ASSISTED THORACOSCOPY, RESECTION OF POSTERIOR MEDIASTINAL MASS;  Surgeon: Av Peña MD;   Location:  OR     TONSILLECTOMY  as a kid     TONSILLECTOMY          Meds: reviewed  Current Outpatient Medications   Medication Sig Dispense Refill     acetaminophen (TYLENOL) 325 MG tablet Take 3 tablets (975 mg) by mouth every 6 hours as needed for pain 50 tablet 0     albuterol (PROVENTIL) (2.5 MG/3ML) 0.083% neb solution Take 1 vial (2.5 mg) by nebulization every 6 hours as needed for shortness of breath / dyspnea or wheezing 25 vial 3     atorvastatin (LIPITOR) 80 MG tablet Take 1 tablet (80 mg) by mouth daily 90 tablet 0     benzoyl peroxide (ACNE-CLEAR) 10 % external gel Apply topically 2 times daily as needed       blood glucose (ACCU-CHEK ALLYSON PLUS) test strip USE TO TEST BLOOD SUGAR ONE TIME DAILY OR AS DIRECTED 100 each 0     blood glucose (NO BRAND SPECIFIED) lancets standard Use to test blood sugar 1 times daily or as directed. 100 each 3     blood glucose (NO BRAND SPECIFIED) lancets standard Use to test blood sugar 1 time daily 100 each 1     blood glucose (NO BRAND SPECIFIED) test strip Use to test blood sugar 1 times daily or as directed.  Dispense Accu-chek Allyson Plus or per patient insurance 100 strip 3     blood glucose monitoring (NO BRAND SPECIFIED) meter device kit Use to test blood sugar 1 times daily or as directed.  Dispense Accu-chek Allyson Plus or per insurance preference 1 kit 0     blood glucose monitoring (NO BRAND SPECIFIED) meter device kit Use to test blood sugar 1 times daily or as directed. 1 kit 0     buPROPion (WELLBUTRIN XL) 150 MG 24 hr tablet TAKE ONE TABLET BY MOUTH EVERY MORNING 90 tablet 0     cetirizine HCl 10 MG CAPS Take 1 capsule (10 mg) by mouth daily as needed Takes in the spring. 90 capsule 3     clindamycin 1 % EX external gel Apply topically 2 times daily 60 g 3     clonazePAM (KLONOPIN) 1 MG tablet Take 1 tablet (1 mg) by mouth 2 times daily as needed for anxiety She needs to see her PCP to get more tablets 5 tablet 0     cyclobenzaprine (FLEXERIL) 5 MG tablet  Take 1 tablet (5 mg) by mouth 3 times daily as needed for muscle spasms 30 tablet 1     desonide (DESOWEN) 0.05 % external cream Apply topically daily       fluticasone 50 MCG/ACT NA nasal spray Spray 2 sprays in nostril daily 16 g 5     Fluticasone-Umeclidin-Vilanterol (TRELEGY ELLIPTA) 100-62.5-25 MCG/INH oral inhaler Inhale 1 puff into the lungs daily       furosemide (LASIX) 20 MG tablet TAKE ONE TABLET BY MOUTH EVERY DAY 90 tablet 0     glipiZIDE (GLUCOTROL XL) 2.5 MG 24 hr tablet TAKE ONE TABLET BY MOUTH EVERY DAY 30 tablet 1     hydrocortisone 2.5 % cream Apply topically 2 times daily 30 g 1     hydrOXYzine (ATARAX) 50 MG tablet Take 2 tablet 3 times a day as needed for anxiety. This will be a weekly Rx of max 70 tabs/week due to recent events 70 tablet 5     lamoTRIgine (LAMICTAL) 100 MG tablet 100 mg daily        lisinopril (ZESTRIL) 20 MG tablet Take 1 tablet (20 mg) by mouth daily 90 tablet 0     metFORMIN (GLUCOPHAGE) 500 MG tablet TAKE ONE TABLET BY MOUTH EVERY DAY WITH BREAKFAST 90 tablet 1     montelukast (SINGULAIR) 10 MG tablet Take 1 tablet (10 mg) by mouth At Bedtime 90 tablet 4     nicotine (NICORETTE) 2 MG gum Place 1 each (2 mg) inside cheek as needed for smoking cessation 150 each 1     nitroGLYcerin (NITROSTAT) 0.4 MG sublingual tablet FOR CHEST PAIN PLACE ONE TABLET UNDER THE TONGUE EVERY 5 MINUTES FOR 3 DOSES.  IF SYPTOMS PERSIST 5 MINUTES AFTER 1ST DOSE CALL 911 25 tablet 0     nystatin (MYCOSTATIN) 871961 UNIT/GM external powder Apply topically daily       omeprazole (PRILOSEC OTC) 20 MG EC tablet Take 1 tablet (20 mg) by mouth 2 times daily 180 tablet 1     ondansetron (ZOFRAN) 8 MG tablet TAKE 1/2 -1 TABLET BY MOUTH EVERY 8 HOURS AS NEEDED FOR NAUSEA 30 tablet 1     order for DME Please provide oximeter 1 Device 0     order for DME Equipment being ordered: Digital home blood pressure monitor kit 1 Device 0     order for DME Equipment being ordered: Pulse Oxymeter 1 Device 0      pregabalin (LYRICA) 50 MG capsule Take 1 capsule (50 mg) by mouth 3 times daily 90 capsule 1     sulfamethoxazole-trimethoprim (BACTRIM DS) 800-160 MG tablet Take 1 tablet by mouth 2 times daily Take one tablet twice daily. For additional refills, please schedule a follow-up appointment. 60 tablet 0     VENTOLIN  (90 Base) MCG/ACT inhaler Inhale 2 puffs into the lungs every 6 hours as needed for shortness of breath / dyspnea or wheezing NEELAM--Ventolin Brand only       azithromycin (ZITHROMAX) 250 MG tablet Two tablets first day, then one tablet daily for four days. (Patient not taking: Reported on 12/1/2020) 6 tablet 0       Soc Hx: reviewed  Fam Hx: reviewed       Hospital Follow-up Visit:    Hospital/Nursing Home/IP Rehab Facility: [facility]  Date of Admission: Nov 20, 2020  Date of Discharge: Nov 22, 2020  Reason(s) for Admission:   Discharge Diagnosis:  Intentional overdose with Klonopin and hydroxyzine.  Acute toxic encephalopathy secondary to drug overdose  Grief reaction            Was your hospitalization related to COVID-19? No   Problems taking medications regularly:  None  Medication changes since discharge: as above   Problems adhering to non-medication therapy:  None    Summary of hospitalization:  [facility] discharge summary reviewed  Diagnostic Tests/Treatments reviewed.  Follow up needed: as above   Other Healthcare Providers Involved in Patient s Care:         psychiatry, GI   Update since discharge: stable.       Post Discharge Medication Reconciliation: discharge medications reconciled and changed, per note/orders.  Plan of care communicated with patient                   Roro Low MD  Internal Medicine

## 2020-12-03 ENCOUNTER — TELEPHONE (OUTPATIENT)
Dept: INTERNAL MEDICINE | Facility: CLINIC | Age: 50
End: 2020-12-03

## 2020-12-04 ENCOUNTER — TRANSFERRED RECORDS (OUTPATIENT)
Dept: HEALTH INFORMATION MANAGEMENT | Facility: CLINIC | Age: 50
End: 2020-12-04

## 2020-12-04 NOTE — TELEPHONE ENCOUNTER
[facility] now requests orders and shares plan of care/discharge summaries for some patients through [employer].  Please REPLY TO THIS MESSAGE OR ROUTE BACK TO THE AUTHOR in order to give authorization for orders when needed.  This is considered a verbal order, you will still receive a faxed copy of orders for signature.  Thank you for your assistance in improving collaboration for our patients.    ORDERS- SUSANNA  SN 1 week 1, 2 PRN (is due for recert next week)   Pt wondering if she can get Rx for new BP and nebulizer machine. States both are broken.   Pts Lamotrigine dose on bottle in home is 150mg daily (Current hospital med list has listed at 100mg)  Pt is also on 2000 units of Vit D daily, not on current med list.      Vitals today were /82, Pulse 83, RR 14, SPO2 95%, Temp 97.4.    NNEKA Milian

## 2020-12-07 ENCOUNTER — TELEPHONE (OUTPATIENT)
Dept: INTERNAL MEDICINE | Facility: CLINIC | Age: 50
End: 2020-12-07

## 2020-12-07 ENCOUNTER — TELEPHONE (OUTPATIENT)
Dept: CARE COORDINATION | Facility: CLINIC | Age: 50
End: 2020-12-07

## 2020-12-07 ENCOUNTER — NURSE TRIAGE (OUTPATIENT)
Dept: INTERNAL MEDICINE | Facility: CLINIC | Age: 50
End: 2020-12-07

## 2020-12-07 DIAGNOSIS — R73.03 PREDIABETES: ICD-10-CM

## 2020-12-07 NOTE — TELEPHONE ENCOUNTER
"    Reason for Disposition    MODERATE difficulty breathing (e.g., speaks in phrases, SOB even at rest, pulse 100-120)    Additional Information    Negative: SEVERE difficulty breathing (e.g., struggling for each breath, speaks in single words)    Negative: Difficult to awaken or acting confused (e.g., disoriented, slurred speech)    Negative: Bluish (or gray) lips or face now    Negative: Shock suspected (e.g., cold/pale/clammy skin, too weak to stand, low BP, rapid pulse)    Negative: Sounds like a life-threatening emergency to the triager    Negative: [1] COVID-19 exposure AND [2] no symptoms    Negative: COVID-19 and Breastfeeding, questions about    Negative: [1] Adult with possible COVID-19 symptoms AND [2] triager concerned about severity of symptoms or other causes    Negative: SEVERE or constant chest pain or pressure (Exception: mild central chest pain, present only when coughing)    Answer Assessment - Initial Assessment Questions  1. COVID-19 DIAGNOSIS: \"Who made your Coronavirus (COVID-19) diagnosis?\" \"Was it confirmed by a positive lab test?\" If not diagnosed by a HCP, ask \"Are there lots of cases (community spread) where you live?\" (See public health department website, if unsure)      Patient has not been diagnosed.  C/o symptoms.  2. ONSET: \"When did the COVID-19 symptoms start?\"       Per patient, symptoms started 12-5-20 evening.  3. WORST SYMPTOM: \"What is your worst symptom?\" (e.g., cough, fever, shortness of breath, muscle aches)      Per patient, worst symptom is pressure in head d/t blowing nose constantly and sneezing constantly.  4. COUGH: \"Do you have a cough?\" If so, ask: \"How bad is the cough?\"        Patient c/o dry cough.  5. FEVER: \"Do you have a fever?\" If so, ask: \"What is your temperature, how was it measured, and when did it start?\"      No fever per patient.  6. RESPIRATORY STATUS: \"Describe your breathing?\" (e.g., shortness of breath, wheezing, unable to speak)       Patient is " "c/o shortness of breath--(has copd and asthma).  7. BETTER-SAME-WORSE: \"Are you getting better, staying the same or getting worse compared to yesterday?\"  If getting worse, ask, \"In what way?\"      Per patient, states she is getting worse.  8. HIGH RISK DISEASE: \"Do you have any chronic medical problems?\" (e.g., asthma, heart or lung disease, weak immune system, etc.)      Asthma and copd.  9. PREGNANCY: \"Is there any chance you are pregnant?\" \"When was your last menstrual period?\"      NA  10. OTHER SYMPTOMS: \"Do you have any other symptoms?\"  (e.g., chills, fatigue, headache, loss of smell or taste, muscle pain, sore throat)        C/o fatigue, loss of taste and smell.    Protocols used: CORONAVIRUS (COVID-19) DIAGNOSED OR [id number] 8.4.20      "

## 2020-12-08 NOTE — TELEPHONE ENCOUNTER
[facility] now requests orders and shares plan of care/discharge summaries for some patients through [employer].  Please REPLY TO THIS MESSAGE OR ROUTE BACK TO THE AUTHOR in order to give authorization for orders when needed.  This is considered a verbal order, you will still receive a faxed copy of orders for signature.  Thank you for your assistance in improving collaboration for our patients.    ORDER    SN to perform in home COVID19 test due to c/o of congestion, coughing, nasal drip, reports recent loss of taste/smell.     SN to continue with weekly SN vists and 3 PRN for med set up/mgmt, disease  Mgmt, mental health mgmt, safety and fall risk assessments.     Thank you,     NNEKA España

## 2020-12-09 RX ORDER — GLIPIZIDE 2.5 MG/1
TABLET, EXTENDED RELEASE ORAL
Qty: 90 TABLET | Refills: 1 | Status: SHIPPED | OUTPATIENT
Start: 2020-12-09 | End: 2021-05-28

## 2020-12-10 PROCEDURE — U0003 INFECTIOUS AGENT DETECTION BY NUCLEIC ACID (DNA OR RNA); SEVERE ACUTE RESPIRATORY SYNDROME CORONAVIRUS 2 (SARS-COV-2) (CORONAVIRUS DISEASE [COVID-19]), AMPLIFIED PROBE TECHNIQUE, MAKING USE OF HIGH THROUGHPUT TECHNOLOGIES AS DESCRIBED BY CMS-2020-01-R: HCPCS | Performed by: INTERNAL MEDICINE

## 2020-12-11 ENCOUNTER — TELEPHONE (OUTPATIENT)
Dept: INTERNAL MEDICINE | Facility: CLINIC | Age: 50
End: 2020-12-11

## 2020-12-11 ENCOUNTER — TELEPHONE (OUTPATIENT)
Dept: GASTROENTEROLOGY | Facility: OUTPATIENT CENTER | Age: 50
End: 2020-12-11

## 2020-12-11 DIAGNOSIS — S00.521A BLISTER OF LIP: ICD-10-CM

## 2020-12-11 DIAGNOSIS — Z11.59 ENCOUNTER FOR SCREENING FOR OTHER VIRAL DISEASES: Primary | ICD-10-CM

## 2020-12-11 DIAGNOSIS — R11.0 NAUSEA: Primary | ICD-10-CM

## 2020-12-11 LAB
LABORATORY COMMENT REPORT: NORMAL
SARS-COV-2 RNA SPEC QL NAA+PROBE: NEGATIVE
SARS-COV-2 RNA SPEC QL NAA+PROBE: NORMAL
SPECIMEN SOURCE: NORMAL
SPECIMEN SOURCE: NORMAL

## 2020-12-11 RX ORDER — PROCHLORPERAZINE MALEATE 10 MG
10 TABLET ORAL EVERY 8 HOURS PRN
Qty: 30 TABLET | Refills: 0 | Status: SHIPPED | OUTPATIENT
Start: 2020-12-11 | End: 2022-02-16

## 2020-12-11 RX ORDER — VALACYCLOVIR HYDROCHLORIDE 1 G/1
2000 TABLET, FILM COATED ORAL 2 TIMES DAILY
Qty: 4 TABLET | Refills: 0 | Status: SHIPPED | OUTPATIENT
Start: 2020-12-11 | End: 2021-09-24

## 2020-12-11 NOTE — TELEPHONE ENCOUNTER
Message needs RN triage    Compazine rx done    Lip blister   -- can be herpes and I will send Rx for Valtrex  -- it can be sign of severe allergy. If she feels ill she needs to be eval in ER

## 2020-12-11 NOTE — TELEPHONE ENCOUNTER
Call to patient. Advised. States she was tested yesterday for COVID. States she has been really sick since Saturday. States her lips are swollen where the blisters are. States her lips are sore. Patient is trying to keep hydrated.    Results are available and COVID is negative. Patient advised.

## 2020-12-11 NOTE — TELEPHONE ENCOUNTER
Patient calls and states that the Zofran is not working and is asking for something stronger for her nausea.  Patient also states she has about 5 blisters on her lips that are popping and draining. She is unsure if they are cold sores but her lips are swollen.  Patient is requesting RX for her lips.    Patient is also waiting for results on COVID-19 test.

## 2020-12-11 NOTE — TELEPHONE ENCOUNTER
Caller: PATIENT    Reason for cancel? PT IS SICK- Cx 12/16 motility     Rescheduled? Y-2/24    Will patient call back to reschedule?n/a

## 2020-12-18 ENCOUNTER — TELEPHONE (OUTPATIENT)
Dept: INTERNAL MEDICINE | Facility: CLINIC | Age: 50
End: 2020-12-18

## 2020-12-18 DIAGNOSIS — J44.9 COPD, MODERATE (H): ICD-10-CM

## 2020-12-18 DIAGNOSIS — I10 HTN, GOAL BELOW 140/90: Primary | ICD-10-CM

## 2020-12-18 NOTE — TELEPHONE ENCOUNTER
Patient calls, states that home care nurse told her she was going to request orders for new nebulizer machine and BP monitor. Patient states she prefers nebulizer mask     She states that her previous BP monitor broke because she accidentally stepped on it. She states if insurance does not cover, her CAD[employer] waiver can get this covered.     Patient states orders should be sent to [facility] coordinator-Sarah:  Phone: [phone]  Fax: [phone]

## 2020-12-21 ENCOUNTER — TELEPHONE (OUTPATIENT)
Dept: INTERNAL MEDICINE | Facility: CLINIC | Age: 50
End: 2020-12-21

## 2020-12-28 ENCOUNTER — TELEPHONE (OUTPATIENT)
Dept: INTERNAL MEDICINE | Facility: CLINIC | Age: 50
End: 2020-12-28

## 2020-12-28 NOTE — TELEPHONE ENCOUNTER
[facility]  Certification and POC from 12/10/20 to 2/7/21  Dr. Low's in basket   Fax

## 2020-12-29 DIAGNOSIS — Z53.9 DIAGNOSIS NOT YET DEFINED: Primary | ICD-10-CM

## 2020-12-29 PROCEDURE — G0179 MD RECERTIFICATION HHA PT: HCPCS | Performed by: INTERNAL MEDICINE

## 2020-12-31 DIAGNOSIS — Z71.6 ENCOUNTER FOR SMOKING CESSATION COUNSELING: ICD-10-CM

## 2020-12-31 RX ORDER — BUPROPION HYDROCHLORIDE 150 MG/1
TABLET ORAL
Qty: 90 TABLET | Refills: 0 | Status: SHIPPED | OUTPATIENT
Start: 2020-12-31 | End: 2021-03-01

## 2020-12-31 NOTE — TELEPHONE ENCOUNTER
Reason for Call:  Other prescription    Detailed comments: lyrica - can barley walk, left hand is col right hand is hot. It is worse in legs and back.    Phone Number Patient can be reached at: Cell number on file:    Telephone Information:   Mobile [phone]   Mobile        Best Time: Any    Can we leave a detailed message on this number? YES    Call taken on 12/31/2020 at 11:41 AM by Keyonna Alex

## 2021-01-01 DIAGNOSIS — L73.2 HIDRADENITIS SUPPURATIVA: ICD-10-CM

## 2021-01-04 ENCOUNTER — TELEPHONE (OUTPATIENT)
Dept: INTERNAL MEDICINE | Facility: CLINIC | Age: 51
End: 2021-01-04

## 2021-01-04 NOTE — TELEPHONE ENCOUNTER
"Pt has been experiencing increase in BLE neuropathy and having increased \"maría horses\" in legs. She is wondering if Lyrica can be increased or if there is a different med she can try. Educated on staying hydrated and elevating legs when possible. Thank you!    Raisa Subramanian RN  [phone]  "

## 2021-01-06 ENCOUNTER — TELEPHONE (OUTPATIENT)
Dept: INTERNAL MEDICINE | Facility: CLINIC | Age: 51
End: 2021-01-06

## 2021-01-06 DIAGNOSIS — J44.9 COPD, MODERATE (H): ICD-10-CM

## 2021-01-06 RX ORDER — ALBUTEROL SULFATE 0.83 MG/ML
2.5 SOLUTION RESPIRATORY (INHALATION) EVERY 6 HOURS PRN
Qty: 25 VIAL | Refills: 3 | Status: SHIPPED | OUTPATIENT
Start: 2021-01-06 | End: 2022-05-10

## 2021-01-06 RX ORDER — SULFAMETHOXAZOLE/TRIMETHOPRIM 800-160 MG
1 TABLET ORAL 2 TIMES DAILY
Qty: 60 TABLET | Refills: 0 | OUTPATIENT
Start: 2021-01-06

## 2021-01-06 NOTE — TELEPHONE ENCOUNTER
Patient calling stating her  Sarah reordered a neb machine for patient and the supply company is needing to know the name of the medication to be used in the nebulizer.  Albuterol has been ordered in the past.   Patient thought she was on Duoneb and wonders if she should be taking that.  Please advise, thanks.      Fax order of which medication patient is taking with the nebulizer to Sarah at [phone] and she can fax it to the supply company.        Medication pended below.  Please sign if appropriate.  thanks

## 2021-01-08 DIAGNOSIS — I10 HTN, GOAL BELOW 140/90: ICD-10-CM

## 2021-01-08 NOTE — TELEPHONE ENCOUNTER
Pending Prescriptions:                       Disp   Refills    lisinopril (ZESTRIL) 20 MG tablet [Pharmac*90 tab*0        Sig: TAKE ONE TABLET BY MOUTH EVERY DAY

## 2021-01-11 ENCOUNTER — TELEPHONE (OUTPATIENT)
Dept: INTERNAL MEDICINE | Facility: CLINIC | Age: 51
End: 2021-01-11

## 2021-01-11 RX ORDER — LISINOPRIL 20 MG/1
TABLET ORAL
Qty: 90 TABLET | Refills: 0 | Status: SHIPPED | OUTPATIENT
Start: 2021-01-11 | End: 2021-04-02

## 2021-01-11 NOTE — TELEPHONE ENCOUNTER
Sarah patients  requesting to speak to a triage nurse. She has medication questions. Ok to call and  [phone]

## 2021-01-12 NOTE — TELEPHONE ENCOUNTER
[phone] fax for Sarah--copy of Albuterol neb solution rx faxed to Sarah.  SERJIO Chandra R.N.

## 2021-01-14 ENCOUNTER — TELEPHONE (OUTPATIENT)
Dept: INTERNAL MEDICINE | Facility: CLINIC | Age: 51
End: 2021-01-14

## 2021-01-16 NOTE — TELEPHONE ENCOUNTER
In my prior message I asked the patient to schedule a face-to-face appointment to fill out these forms.  See below message from December 18, 2020 if the devices are not covered by insurance    Forms not done

## 2021-01-18 ENCOUNTER — MEDICAL CORRESPONDENCE (OUTPATIENT)
Dept: HEALTH INFORMATION MANAGEMENT | Facility: CLINIC | Age: 51
End: 2021-01-18

## 2021-01-19 ENCOUNTER — TELEPHONE (OUTPATIENT)
Dept: INTERNAL MEDICINE | Facility: CLINIC | Age: 51
End: 2021-01-19

## 2021-01-19 NOTE — TELEPHONE ENCOUNTER
Patient was advised the first time we received this form that she needed a face to face visit.  Writer also notified the [employer] Waiver coordinator Zunilda this time.

## 2021-01-19 NOTE — TELEPHONE ENCOUNTER
Reason for call:  Other   Patient called regarding (reason for call): call back  Additional comments: nebulizer order  Medical supply company has already shipped the nebulizer to patient so it is all done, no further actions needed      Phone number to reach patient:  [phone]    Best Time:  any    Can we leave a detailed message on this number?  YES    Travel screening: Not Applicable

## 2021-01-22 DIAGNOSIS — R11.0 NAUSEA: ICD-10-CM

## 2021-01-22 RX ORDER — ONDANSETRON 8 MG/1
TABLET, FILM COATED ORAL
Qty: 30 TABLET | Refills: 2 | Status: SHIPPED | OUTPATIENT
Start: 2021-01-22 | End: 2021-03-24

## 2021-01-22 NOTE — TELEPHONE ENCOUNTER
Pending Prescriptions:                       Disp   Refills    ondansetron (ZOFRAN) 8 MG tablet [Pharmac*30 tab*2            Sig: TAKE ONE-HALF TO ONE TABLET BY MOUTH EVERY 8           HOURS AS NEEDED FOR NAUSEA    Prescription approved per [facility] Refill Protocol.

## 2021-01-28 DIAGNOSIS — L73.2 HIDRADENITIS SUPPURATIVA: ICD-10-CM

## 2021-01-28 RX ORDER — SULFAMETHOXAZOLE/TRIMETHOPRIM 800-160 MG
1 TABLET ORAL 2 TIMES DAILY
Qty: 180 TABLET | Refills: 0 | Status: SHIPPED | OUTPATIENT
Start: 2021-01-28 | End: 2021-04-30

## 2021-01-28 NOTE — TELEPHONE ENCOUNTER
sulfamethoxazole-trimethoprim (BACTRIM DS) 800-160 MG tablet      Last Written Prescription Date:  11-  Last Fill Quantity: 60,   # refills: 0  Last Office Visit : 7-2-2020  Future Office visit:  4-    Routing refill request to provider for review/approval because:  Not on protocol.  Overdue for 3 month follow up - now scheduled for 4-  Possible gap in treatment.      Kathleen M Doege RN

## 2021-01-28 NOTE — TELEPHONE ENCOUNTER
Received medication refill request for Bactrim DS. Refill request approved. Next appointment 4/2021.

## 2021-01-28 NOTE — TELEPHONE ENCOUNTER
Health Call Center    Phone Message    May a detailed message be left on voicemail: yes     Reason for Call: Medication Refill Request    Has the patient contacted the pharmacy for the refill? Yes     Name of medication being requested:   sulfamethoxazole-trimethoprim (BACTRIM DS) 800-160 MG tablet    Provider who prescribed the medication:   Dr Jordan    Pharmacy:   [facility] [address] [place] [address] [id number]    Date medication is needed: ASAP- pt has been without Rx for 2 weeks and is having a flare up.       Action Taken: Message routed to:  [facility] ([facility] Derm Rheum    Travel Screening: Not Applicable           Pt has scheduled FU appt with Dr Jordan on 4/29/21. Please refill her prescription for sulfamethoxazole-trimethoprim (BACTRIM DS) 800-160 MG tablet at least until her appt.    Per the Home[facility] , the Pt is having breakouts on her abdomen and bottom due to not taking medication for 2 weeks.    Thank you-

## 2021-02-02 DIAGNOSIS — L30.9 ECZEMA, UNSPECIFIED TYPE: ICD-10-CM

## 2021-02-03 RX ORDER — HYDROCORTISONE 2.5 %
CREAM (GRAM) TOPICAL
Qty: 30 G | Refills: 1 | Status: SHIPPED | OUTPATIENT
Start: 2021-02-03 | End: 2021-03-09

## 2021-02-05 ENCOUNTER — TELEPHONE (OUTPATIENT)
Dept: INTERNAL MEDICINE | Facility: CLINIC | Age: 51
End: 2021-02-05

## 2021-02-05 DIAGNOSIS — R05.9 COUGH: ICD-10-CM

## 2021-02-05 DIAGNOSIS — J44.1 OBSTRUCTIVE CHRONIC BRONCHITIS WITH EXACERBATION (H): Primary | ICD-10-CM

## 2021-02-05 RX ORDER — PREDNISONE 20 MG/1
20 TABLET ORAL DAILY
Qty: 7 TABLET | Refills: 0 | Status: SHIPPED | OUTPATIENT
Start: 2021-02-05 | End: 2021-05-20 | Stop reason: DRUGHIGH

## 2021-02-05 RX ORDER — AZITHROMYCIN 250 MG/1
TABLET, FILM COATED ORAL
Qty: 6 TABLET | Refills: 0 | Status: SHIPPED | OUTPATIENT
Start: 2021-02-05 | End: 2021-03-09

## 2021-02-05 NOTE — TELEPHONE ENCOUNTER
"Call received from patient requesting prescription for zpack and prednisone. Patient has been sick since last night. Reports upper respiratory symptoms. States mucus is yellow/light green. Reports chest tightness. Patient also has cough that started this morning. States it is \"not covid\" because she hasn't been around anyone and she gets this all of the time. Patient sounded extremly congested and had cough off and on throughout conversation. Please advise.    "

## 2021-02-09 ENCOUNTER — TELEPHONE (OUTPATIENT)
Dept: INTERNAL MEDICINE | Facility: CLINIC | Age: 51
End: 2021-02-09

## 2021-02-09 NOTE — TELEPHONE ENCOUNTER
SKILLED NURSING order received via fax. Form in your mailbox to be signed.     This is a recent snapshot of the patient's [facility] medical record.  For current drug dose and complete information and questions, call [phone] or In Basket pool, [facility] ([id number])  CSN Number:  [id number]

## 2021-02-10 ENCOUNTER — TELEPHONE (OUTPATIENT)
Dept: GASTROENTEROLOGY | Facility: OUTPATIENT CENTER | Age: 51
End: 2021-02-10

## 2021-02-10 NOTE — TELEPHONE ENCOUNTER
Caller :PATIENT     Reason for cancel? CX 2/24-MANOMETRY DUE TO NOT WANTING TO DO ANOTHER COVID TEST    Rescheduled? N    Will patient call back to reschedule?Y

## 2021-02-10 NOTE — TELEPHONE ENCOUNTER
Forms signed   HPI Comments: Patient with progressive fatigue is been seen by pulmonary once placed on steroids and \"breo\" inhaler   patient also seen by Drs. Care placed on steroids and antibiotics  Not doing any better. Severe fatigue and dyspnea just with walking across her mobile home  Cough productive of green phlegm, no fevers. Patient is a 69 y.o. female presenting with fatigue. The history is provided by the patient and a relative. Fatigue   This is a new problem. Episode onset: 3 months, progressively worsening, The problem has been gradually worsening. There was no focality noted. Primary symptoms comment: Dyspnea and fatigue with even the slightest exertion. Patient cannot walk across  her mobile home/trailer. . There has been no fever. Associated symptoms include shortness of breath and nausea. Pertinent negatives include no chest pain, no vomiting, no altered mental status, no confusion, no headaches and no bowel incontinence. Associated symptoms comments: Productive cough  20 pounds weight loss in the last few months    .  There were no medications administered prior to arrival.        Past Medical History:   Diagnosis Date    Anemia 2005 approx    2 units transfused    Arthritis     hands, hips    B12 deficiency 8/16/2016    Cancer ([facility] [address] [address]     hx of melanoma 1986 R arm, and carcinoma x 2 forehead    Depression with anxiety 8/16/2016    Essential hypertension with goal blood pressure less than 140/90 8/16/2016    Gastrointestinal disorder     hiatel hernia    Hyperlipidemia, unspecified 8/16/2016    Mild intermittent asthma 8/16/2016    Osteoarthritis of multiple joints 8/16/2016    Osteoporosis 10/25/2016    Pulmonary emphysema ([facility] [address] 8/16/2016    Rectal bleed     hiatal hernia ulcerated       Past Surgical History:   Procedure Laterality Date    HX HERNIA REPAIR  2010    helped reflex    HX TUBAL LIGATION  1972         Family History:   Problem Relation Age of Onset    Stroke Mother     Diabetes Maternal Aunt     Diabetes Maternal Uncle     No Known Problems Father      didn't have contact with father    Breast Cancer Daughter 28       Social History     Social History    Marital status:      Spouse name: N/A    Number of children: N/A    Years of education: N/A     Occupational History    Not on file. Social History Main Topics    Smoking status: Never Smoker    Smokeless tobacco: Never Used    Alcohol use No    Drug use: No    Sexual activity: Yes     Partners: Male     Birth control/ protection: None     Other Topics Concern    Not on file     Social History Narrative    Lives with boyfriend of over 2 decades. She has 3 grown children, 3 grandchildren and 3 great grandchildren. She previously worked as an ; now on social security but was on disability for her nerves. ALLERGIES: Other medication    Review of Systems   Constitutional: Positive for activity change, fatigue and unexpected weight change. Negative for chills and fever. HENT: Negative for rhinorrhea and sore throat. Eyes: Negative for discharge and redness. Respiratory: Positive for cough and shortness of breath. Cardiovascular: Negative for chest pain. Gastrointestinal: Positive for nausea. Negative for abdominal pain, bowel incontinence, diarrhea and vomiting. Musculoskeletal: Positive for arthralgias. Negative for back pain. Skin: Negative for rash. Neurological: Negative for dizziness and headaches. Psychiatric/Behavioral: Negative for confusion. Vitals:    10/06/17 1912 10/06/17 1959 10/06/17 2057 10/06/17 2214   BP: 93/58  90/52 101/59   Pulse: 94  97 93   Resp:       Temp:       SpO2: 93% 93% 91% 92%   Weight:       Height:                Physical Exam   Constitutional: She is oriented to person, place, and time. She appears well-developed and well-nourished. No distress. HENT:   Head: Normocephalic and atraumatic.    Eyes: Conjunctivae and EOM are normal. Pupils are equal, round, and reactive to light. Right eye exhibits no discharge. Left eye exhibits no discharge. No scleral icterus. Neck: Normal range of motion. Neck supple. Cardiovascular: Normal rate, regular rhythm and normal heart sounds. Exam reveals no gallop. No murmur heard. Pulmonary/Chest: Effort normal and breath sounds normal. No respiratory distress. She has no wheezes. She has no rales. Oxygen sats around 90% on room air     Abdominal: Soft. There is no tenderness. There is no guarding. Musculoskeletal: Normal range of motion. She exhibits no edema. Neurological: She is alert and oriented to person, place, and time. She exhibits normal muscle tone. cni 2-12 grossly   Skin: Skin is warm and dry. She is not diaphoretic. Psychiatric: She has a normal mood and affect. Her behavior is normal.   Nursing note and vitals reviewed. MDM  Number of Diagnoses or Management Options  Diagnosis management comments: Medical decision making note:  Hypoxia, productive cough, weight loss, CT suggestive of pneumonia plus or minus mass not appreciated on chest x-ray. Case discussed with pulmonary and patient.,  Need for follow-up imaging status post resolution of infection discussed with patient and family members. Admit  This concludes the \"medical decision making note\" part of this emergency department visit note.       ED Course       Procedures

## 2021-02-11 DIAGNOSIS — E78.5 HYPERLIPIDEMIA LDL GOAL <130: Chronic | ICD-10-CM

## 2021-02-12 RX ORDER — ATORVASTATIN CALCIUM 80 MG/1
TABLET, FILM COATED ORAL
Qty: 90 TABLET | Refills: 0 | Status: SHIPPED | OUTPATIENT
Start: 2021-02-12 | End: 2021-04-30

## 2021-02-12 NOTE — TELEPHONE ENCOUNTER
Pending Prescriptions:                       Disp   Refills    atorvastatin (LIPITOR) 80 MG tablet [Pharm*90 tab*0        Sig: TAKE ONE TABLET BY MOUTH EVERY DAY    Routing refill request to provider for review/approval because:  Labs  this month    LDL Cholesterol Calculated   Date Value Ref Range Status   2020 96 <100 mg/dL Final     Comment:     Desirable:       <100 mg/dl

## 2021-02-15 DIAGNOSIS — R07.9 CHEST PAIN, UNSPECIFIED TYPE: ICD-10-CM

## 2021-02-16 NOTE — TELEPHONE ENCOUNTER
Routing refill request to provider for review/approval because:  Failed refill protocol for needing review

## 2021-02-17 DIAGNOSIS — R60.0 BILATERAL LEG EDEMA: ICD-10-CM

## 2021-02-17 DIAGNOSIS — F41.9 ANXIETY: ICD-10-CM

## 2021-02-17 RX ORDER — NITROGLYCERIN 0.4 MG/1
TABLET SUBLINGUAL
Qty: 25 TABLET | Refills: 0 | Status: SHIPPED | OUTPATIENT
Start: 2021-02-17 | End: 2021-03-25

## 2021-02-17 NOTE — TELEPHONE ENCOUNTER
Reason for Call:  Medication or medication refill:    Do you use a [facility]?  Name of the pharmacy and phone number for the current request:  [facility]  [address] ([place]) - [phone]    Name of the medication requested: zofran    Other request: Pt says she was just in and forgot to discuss this.  Pt needs her script today as she is leaving for [place] tomorrow morning at 5:45.  She will be gone until 02/16/18.  Pt says she was on hold for a long time.  Can we leave a detailed message on this number? YES    Phone number patient can be reached at: Home number on file [phone] (home)    Best Time: any    Call taken on 12/18/2017 at 1:55 PM by Roseanne Mooney       

## 2021-02-18 RX ORDER — HYDROXYZINE HYDROCHLORIDE 50 MG/1
TABLET, FILM COATED ORAL
Qty: 70 TABLET | Refills: 5 | Status: SHIPPED | OUTPATIENT
Start: 2021-02-18 | End: 2021-05-14

## 2021-02-18 RX ORDER — FUROSEMIDE 20 MG
TABLET ORAL
Qty: 90 TABLET | Refills: 0 | Status: SHIPPED | OUTPATIENT
Start: 2021-02-18 | End: 2021-05-07

## 2021-02-18 NOTE — TELEPHONE ENCOUNTER
"Requested Prescriptions   Pending Prescriptions Disp Refills     hydrOXYzine (ATARAX) 50 MG tablet [Pharmacy Med Name: HYDROXYZINE HCL 50MG TABS] 70 tablet 5     Sig: TAKE TWO TABLETS BY MOUTH THREE TIMES A DAY AS NEEDED FOR ANXIETY *MAX OF 70 TABLETS PER WEEK*       Antihistamines Protocol Passed - 2/17/2021  2:23 PM        Passed - Recent (12 mo) or future (30 days) visit within the authorizing provider's specialty     Patient has had an office visit with the authorizing provider or a provider within the authorizing providers department within the previous 12 mos or has a future within next 30 days. See \"Patient Info\" tab in inbasket, or \"Choose Columns\" in Meds & Orders section of the refill encounter.              Passed - Patient is age 3 or older     Apply age and/or weight-based dosing for peds patients age 3 and older.    Forward request to provider for patients under the age of 3.          Passed - Medication is active on med list           furosemide (LASIX) 20 MG tablet [Pharmacy Med Name: FUROSEMIDE 20MG TABS] 90 tablet 0     Sig: TAKE ONE TABLET BY MOUTH EVERY DAY       Diuretics (Including Combos) Protocol Failed - 2/17/2021  2:23 PM        Failed - Normal serum creatinine on file in past 12 months     Recent Labs   Lab Test 11/21/20  0628   CR 1.05*              Passed - Blood pressure under 140/90 in past 12 months     BP Readings from Last 3 Encounters:   11/22/20 103/63   10/22/20 138/84   10/16/20 98/64                 Passed - Recent (12 mo) or future (30 days) visit within the authorizing provider's specialty     Patient has had an office visit with the authorizing provider or a provider within the authorizing providers department within the previous 12 mos or has a future within next 30 days. See \"Patient Info\" tab in inbasket, or \"Choose Columns\" in Meds & Orders section of the refill encounter.              Passed - Medication is active on med list        Passed - Patient is age 18 or older "        Passed - No active pregancy on record        Passed - Normal serum potassium on file in past 12 months     Recent Labs   Lab Test 11/21/20  0628   POTASSIUM 4.0                    Passed - Normal serum sodium on file in past 12 months     Recent Labs   Lab Test 11/21/20 0628                 Passed - No positive pregnancy test in past 12 months

## 2021-02-23 ENCOUNTER — TELEPHONE (OUTPATIENT)
Dept: INTERNAL MEDICINE | Facility: CLINIC | Age: 51
End: 2021-02-23

## 2021-02-24 DIAGNOSIS — Z53.9 DIAGNOSIS NOT YET DEFINED: Primary | ICD-10-CM

## 2021-02-24 PROCEDURE — G0179 MD RECERTIFICATION HHA PT: HCPCS | Performed by: INTERNAL MEDICINE

## 2021-02-25 ENCOUNTER — TELEPHONE (OUTPATIENT)
Dept: INTERNAL MEDICINE | Facility: CLINIC | Age: 51
End: 2021-02-25

## 2021-02-25 DIAGNOSIS — K59.00 CONSTIPATION, UNSPECIFIED CONSTIPATION TYPE: ICD-10-CM

## 2021-02-25 DIAGNOSIS — M79.7 FIBROMYALGIA: ICD-10-CM

## 2021-02-25 DIAGNOSIS — L60.0 INGROWN NAIL: ICD-10-CM

## 2021-02-25 DIAGNOSIS — R21 RASH AND NONSPECIFIC SKIN ERUPTION: Primary | ICD-10-CM

## 2021-02-25 NOTE — TELEPHONE ENCOUNTER
Hi Dr. Low,    Saw pt today and noted redness/yeast under right breast. Pt states it has been there over a week and Nyastatin is not resolving. Would you be comfortable prescribing Diflucan? She also c/o of a tender right great toe. No redness or drainage noted, but toenail looks to be ingrown. Pt would like a podiatry referral. She is also requesting an Rx for Miralax so county can cover cost. Thank you!    Raisa Subramanian RN  [phone]

## 2021-02-26 DIAGNOSIS — Z71.6 ENCOUNTER FOR SMOKING CESSATION COUNSELING: ICD-10-CM

## 2021-02-26 RX ORDER — PRENATAL VIT 91/IRON/FOLIC/DHA 28-975-200
COMBINATION PACKAGE (EA) ORAL 2 TIMES DAILY
Qty: 30 G | Refills: 0 | Status: SHIPPED | OUTPATIENT
Start: 2021-02-26 | End: 2021-03-09

## 2021-02-26 RX ORDER — PREGABALIN 50 MG/1
CAPSULE ORAL
Qty: 90 CAPSULE | Refills: 1 | Status: SHIPPED | OUTPATIENT
Start: 2021-02-26 | End: 2021-05-06

## 2021-02-26 RX ORDER — POLYETHYLENE GLYCOL 3350 17 G/17G
1 POWDER, FOR SOLUTION ORAL DAILY
Qty: 507 G | Refills: 1 | Status: SHIPPED | OUTPATIENT
Start: 2021-02-26 | End: 2022-05-10

## 2021-02-26 NOTE — TELEPHONE ENCOUNTER
RN, please sign the prescriptions to the pharmacy of choice  Podiatry referral done  She has too many medications and they can interact with Diflucan  Lamisil cream over-the-counter might help.  Rx pended  MiraLAX Rx pended

## 2021-03-01 RX ORDER — BUPROPION HYDROCHLORIDE 150 MG/1
TABLET ORAL
Qty: 90 TABLET | Refills: 2 | Status: SHIPPED | OUTPATIENT
Start: 2021-03-01 | End: 2022-01-11

## 2021-03-01 NOTE — TELEPHONE ENCOUNTER
Pending Prescriptions:                       Disp   Refills    buPROPion (WELLBUTRIN XL) 150 MG 24 hr ta*90 tab*2            Sig: TAKE ONE TABLET BY MOUTH EVERY MORNING    Prescription approved per [facility] Refill Protocol.

## 2021-03-05 DIAGNOSIS — L30.9 ECZEMA, UNSPECIFIED TYPE: ICD-10-CM

## 2021-03-08 NOTE — TELEPHONE ENCOUNTER
Pending Prescriptions:                       Disp   Refills    hydrocortisone 2.5 % cream [Pharmacy Med N*       1        Sig: APPLY TOPICALLY TO THE AFFECTED AREA(S) TWO TIMES A           DAY    Routing refill request to provider for review/approval because:  Drug not on the [facility] refill protocol

## 2021-03-09 ENCOUNTER — VIRTUAL VISIT (OUTPATIENT)
Dept: INTERNAL MEDICINE | Facility: CLINIC | Age: 51
End: 2021-03-09
Payer: COMMERCIAL

## 2021-03-09 ENCOUNTER — TELEPHONE (OUTPATIENT)
Dept: INTERNAL MEDICINE | Facility: CLINIC | Age: 51
End: 2021-03-09

## 2021-03-09 DIAGNOSIS — J44.1 COPD EXACERBATION (H): Primary | ICD-10-CM

## 2021-03-09 DIAGNOSIS — R21 RASH AND NONSPECIFIC SKIN ERUPTION: ICD-10-CM

## 2021-03-09 PROCEDURE — 99214 OFFICE O/P EST MOD 30 MIN: CPT | Mod: 95 | Performed by: INTERNAL MEDICINE

## 2021-03-09 RX ORDER — PREDNISONE 20 MG/1
40 TABLET ORAL DAILY
Qty: 10 TABLET | Refills: 0 | Status: SHIPPED | OUTPATIENT
Start: 2021-03-09 | End: 2021-03-14

## 2021-03-09 RX ORDER — HYDROCORTISONE 2.5 %
CREAM (GRAM) TOPICAL
Qty: 30 G | Refills: 1 | Status: SHIPPED | OUTPATIENT
Start: 2021-03-09 | End: 2021-10-01

## 2021-03-09 RX ORDER — PRENATAL VIT 91/IRON/FOLIC/DHA 28-975-200
COMBINATION PACKAGE (EA) ORAL 2 TIMES DAILY
Qty: 30 G | Refills: 2 | Status: SHIPPED | OUTPATIENT
Start: 2021-03-09 | End: 2021-11-04

## 2021-03-09 RX ORDER — AZITHROMYCIN 250 MG/1
TABLET, FILM COATED ORAL
Qty: 6 TABLET | Refills: 0 | Status: SHIPPED | OUTPATIENT
Start: 2021-03-09 | End: 2021-05-06

## 2021-03-09 NOTE — TELEPHONE ENCOUNTER
Left message on patient's voicemail asking her to return call to clinic to schedule an appointment for current symptoms. SERJIO Chandra R.N.

## 2021-03-09 NOTE — TELEPHONE ENCOUNTER
Patient calls to advise she has had URI for 3 days.  Nose running and coughing up both yellow.  Tightness in chest.  Patient states no exposure to COVID-19.  No fatigue, body aches, or sore throat.  Patient is asking for RX for Prednisone and Zpak.  Please advise.

## 2021-03-09 NOTE — PROGRESS NOTES
Swetha is a 50 year old who is being evaluated via a billable video visit.      How would you like to obtain your AVS? MyChart  If the video visit is dropped, the invitation should be resent by: Text to cell phone: [phone]  Will anyone else be joining your video visit? No        This is a VIDEO ( using Doximity)  encounter with the patient.       Location of the provider : office   Location of the patient : home        16:05 --- 16:20           Dr Low's note      Patient's instructions / PLAN:                                                        Plan:  1. Continue with frequent nebulizer treatment   2. Azithromycin 250 mg, take 2 tablets today, then 1 tablet a day for the next 4 days (  antibiotic)   3. Prednisone 40 mg daily for 5 days   4. Schedule annual exam in the morning      ASSESSMENT & PLAN:                                                      (J44.1) COPD exacerbation (H)  (primary encounter diagnosis)  Comment:   Plan: predniSONE (DELTASONE) 20 MG tablet,         azithromycin (ZITHROMAX) 250 MG tablet              (R21) Rash and nonspecific skin eruption  Comment: the feet look much better, but she is out of Lamisil   Plan: terbinafine (LAMISIL) 1 % external cream               Chief complaint:                                                      Cough     SUBJECTIVE:                                                    History of present illness:    Cough  -- chest feels tight, green sputum, cough, wheezes, no fever  --         Hyperlipidemia Follow-Up      Are you regularly taking any medication or supplement to lower your cholesterol?   Yes- lipitor    Are you having muscle aches or other side effects that you think could be caused by your cholesterol lowering medication?  No    Hypertension Follow-up      Do you check your blood pressure regularly outside of the clinic? No     Are you following a low salt diet? Yes    Are your blood pressures ever more than 140 on the top number (systolic)  "OR more   than 90 on the bottom number (diastolic), for example 140/90? No      How many servings of fruits and vegetables do you eat daily?  2-3    On average, how many sweetened beverages do you drink each day (Examples: soda, juice, sweet tea, etc.  Do NOT count diet or artificially sweetened beverages)?   0    How many days per week do you exercise enough to make your heart beat faster? 3 or less    How many minutes a day do you exercise enough to make your heart beat faster? 9 or less    How many days per week do you miss taking your medication? 0      Review of Systems:                                                      ROS: negative for fever, chills, chest pain, shortness of breath, vomiting, abdominal pain, leg swelling pos for cough, wheezes      OBJECTIVE:           An actual physical exam can't be done during phone visit   A limited exam can sometimes be performed by video visit   She looks tired, she coughs frequently       PMHx: reviewed  Past Medical History:   Diagnosis Date     Anxiety state, unspecified      Asthma      Chronic pain     back pain from cyst     Contact dermatitis and other eczema, due to unspecified cause      COPD (chronic obstructive pulmonary disease) (H)      Depressive disorder, not elsewhere classified      Diabetes (H)      Emphysema with chronic bronchitis (H)      Esophageal reflux      Family history of ischemic heart disease      Fibromyalgia      Gastro-oesophageal reflux disease      History of emphysema      Hoarseness      HTN, goal below 140/90      Hyperlipidemia LDL goal <130      Liver disease     \"fatty liver\"     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)      PONV (postoperative nausea and vomiting)      Rectal bleeding       PSHx: reviewed  Past Surgical History:   Procedure Laterality Date     ARTHROSCOPY SHOULDER DECOMPRESSION Left 10/21/2015    Procedure: ARTHROSCOPY SHOULDER DECOMPRESSION;  Surgeon: Julien Milian MD;  Location: [facility]     BIOPSY " ARTERY TEMPORAL Left 3/11/2020    Procedure: LEFT TEMPORAL ARTERY BIOPSY;  Surgeon: Ibeth Conner MD;  Location: [facility]     BRONCHIAL THERMOPLASTY N/A 11/14/2014    Procedure: BRONCHIAL THERMOPLASTY;  Surgeon: Ward Whitaker MD;  [facility]: [facility]     BRONCHIAL THERMOPLASTY N/A 12/19/2014    Procedure: BRONCHIAL THERMOPLASTY;  Surgeon: Wadr Whitaker MD;  [facility]     BRONCHIAL THERMOPLASTY N/A 2/6/2015    Procedure: BRONCHIAL THERMOPLASTY;  Surgeon: Ward Whitaker MD;  [facility]     C APPENDECTOMY  at age 17     COLONOSCOPY N/A 11/26/2018    Procedure: COLONOSCOPY ([facility]);  Surgeon: Marshall Oakes MD;  Location: [facility]     COLONOSCOPY N/A 10/22/2020    Procedure: Colonoscopy;  Surgeon: Marshall Mccormick MD;  Location: [facility]     DISCECTOMY, FUSION CERVICAL ANTERIOR ONE LEVEL, COMBINED N/A 5/1/2018    Procedure: COMBINED DISCECTOMY, FUSION CERVICAL ANTERIOR ONE LEVEL;  1.  C5-C6 anterior cervical diskectomy and fusion.    2.  C5-C6 application of intervertebral biomechanical device for interbody fusion purposes.    3.  C5-C6 anterior instrumentation using the standard Kristin InViZia 24 mm plate with four associated bone screws, 12 mm in length.  Inferior screws are fixed.  Superior screws are va     ENT SURGERY  2015    polyps removed from vocal cords      ESOPHAGOSCOPY, GASTROSCOPY, DUODENOSCOPY (EGD), COMBINED N/A 10/22/2020    Procedure: Esophagoscopy, gastroscopy, duodenoscopy with biopsies;  Surgeon: Marshall Mccormick MD;  Location: [facility]     EXCISE NODE MEDIASTINAL  4/26/2013    Procedure: EXCISE NODE MEDIASTINAL;;  Surgeon: Av Peña MD;  Location: [facility]     LAPAROSCOPIC CHOLECYSTECTOMY N/A 10/19/2017    Procedure: LAPAROSCOPIC CHOLECYSTECTOMY;  LAPAROSCOPIC CHOLECYSTECTOMY;  Surgeon: Ney Jerry MD;  Location: [facility]     THORACOSCOPY  4/26/2013    Procedure: THORACOSCOPY;  LEFT VIDEO ASSISTED THORACOSCOPY, RESECTION OF POSTERIOR MEDIASTINAL  MASS;  Surgeon: Av Peña MD;  Location:  OR     TONSILLECTOMY  as a kid     TONSILLECTOMY          Meds: reviewed  Current Outpatient Medications   Medication Sig Dispense Refill     acetaminophen (TYLENOL) 325 MG tablet Take 3 tablets (975 mg) by mouth every 6 hours as needed for pain 50 tablet 0     albuterol (PROVENTIL) (2.5 MG/3ML) 0.083% neb solution Take 1 vial (2.5 mg) by nebulization every 6 hours as needed for shortness of breath / dyspnea or wheezing 25 vial 3     atorvastatin (LIPITOR) 80 MG tablet TAKE ONE TABLET BY MOUTH EVERY DAY 90 tablet 0     benzoyl peroxide (ACNE-CLEAR) 10 % external gel Apply topically 2 times daily as needed       blood glucose (ACCU-CHEK ALLYSON PLUS) test strip USE TO TEST BLOOD SUGAR ONE TIME DAILY OR AS DIRECTED 100 each 0     blood glucose (NO BRAND SPECIFIED) lancets standard Use to test blood sugar 1 times daily or as directed. 100 each 3     blood glucose (NO BRAND SPECIFIED) lancets standard Use to test blood sugar 1 time daily 100 each 1     blood glucose (NO BRAND SPECIFIED) test strip Use to test blood sugar 1 times daily or as directed.  Dispense Accu-chek Allyson Plus or per patient insurance 100 strip 3     blood glucose monitoring (NO BRAND SPECIFIED) meter device kit Use to test blood sugar 1 times daily or as directed.  Dispense Accu-chek Allyson Plus or per insurance preference 1 kit 0     blood glucose monitoring (NO BRAND SPECIFIED) meter device kit Use to test blood sugar 1 times daily or as directed. 1 kit 0     buPROPion (WELLBUTRIN XL) 150 MG 24 hr tablet TAKE ONE TABLET BY MOUTH EVERY MORNING 90 tablet 2     cetirizine HCl 10 MG CAPS Take 1 capsule (10 mg) by mouth daily as needed Takes in the spring. 90 capsule 3     clindamycin 1 % EX external gel Apply topically 2 times daily 60 g 3     clonazePAM (KLONOPIN) 1 MG tablet Take 1 tablet (1 mg) by mouth 2 times daily as needed for anxiety She needs to see her PCP to get more tablets 5 tablet  0     cyclobenzaprine (FLEXERIL) 5 MG tablet Take 1 tablet (5 mg) by mouth 3 times daily as needed for muscle spasms 30 tablet 1     desonide (DESOWEN) 0.05 % external cream Apply topically daily       fluticasone 50 MCG/ACT NA nasal spray Spray 2 sprays in nostril daily 16 g 5     Fluticasone-Umeclidin-Vilanterol (TRELEGY ELLIPTA) 100-62.5-25 MCG/INH oral inhaler Inhale 1 puff into the lungs daily       furosemide (LASIX) 20 MG tablet TAKE ONE TABLET BY MOUTH EVERY DAY 90 tablet 0     glipiZIDE (GLUCOTROL XL) 2.5 MG 24 hr tablet TAKE ONE TABLET BY MOUTH EVERY DAY 90 tablet 1     hydrocortisone 2.5 % cream APPLY TOPICALLY TO THE AFFECTED AREA(S) TWO TIMES A DAY 30 g 1     hydrOXYzine (ATARAX) 50 MG tablet TAKE TWO TABLETS BY MOUTH THREE TIMES A DAY AS NEEDED FOR ANXIETY *MAX OF 70 TABLETS PER WEEK* 70 tablet 5     lamoTRIgine (LAMICTAL) 100 MG tablet 100 mg daily        lisinopril (ZESTRIL) 20 MG tablet TAKE ONE TABLET BY MOUTH EVERY DAY 90 tablet 0     metFORMIN (GLUCOPHAGE) 500 MG tablet TAKE ONE TABLET BY MOUTH EVERY DAY WITH BREAKFAST 90 tablet 1     montelukast (SINGULAIR) 10 MG tablet Take 1 tablet (10 mg) by mouth At Bedtime 90 tablet 4     nicotine (NICORETTE) 2 MG gum Place 1 each (2 mg) inside cheek as needed for smoking cessation 150 each 1     nitroGLYcerin (NITROSTAT) 0.4 MG sublingual tablet PLACE 1 TABLET UNDER THE TONGUE AT THE 1ST SIGN OF ATTACK. IF PAIN IS UNRELIEVED OR WORSENED 5 MINS AFTER 1ST DOSE, PROMPT MEDICAL ASSISTANCE IS NEEDED. MAY REPEAT EVERY 5 MINS UNTIL PAIN RELIEVED, MAX 3 DOSES 25 tablet 0     nystatin (MYCOSTATIN) 226205 UNIT/GM external powder Apply topically daily       omeprazole (PRILOSEC OTC) 20 MG EC tablet Take 1 tablet (20 mg) by mouth 2 times daily 180 tablet 1     ondansetron (ZOFRAN) 8 MG tablet TAKE ONE-HALF TO ONE TABLET BY MOUTH EVERY 8 HOURS AS NEEDED FOR NAUSEA 30 tablet 2     order for DME Please provide oximeter 1 Device 0     order for DME Equipment being  ordered: Digital home blood pressure monitor kit 1 Device 0     order for DME Equipment being ordered: Pulse Oxymeter 1 Device 0     polyethylene glycol (MIRALAX) 17 GM/Dose powder Take 17 g (1 capful) by mouth daily 507 g 1     predniSONE (DELTASONE) 20 MG tablet Take 1 tablet (20 mg) by mouth daily 7 tablet 0     pregabalin (LYRICA) 50 MG capsule TAKE ONE CAPSULE BY MOUTH THREE TIMES A DAY 90 capsule 1     prochlorperazine (COMPAZINE) 10 MG tablet Take 1 tablet (10 mg) by mouth every 8 hours as needed for nausea or vomiting 30 tablet 0     sulfamethoxazole-trimethoprim (BACTRIM DS) 800-160 MG tablet Take 1 tablet by mouth 2 times daily Take one tablet twice daily. For additional refills, please schedule a follow-up appointment. 180 tablet 0     terbinafine (LAMISIL) 1 % external cream Apply topically 2 times daily 30 g 0     VENTOLIN  (90 Base) MCG/ACT inhaler Inhale 2 puffs into the lungs every 6 hours as needed for shortness of breath / dyspnea or wheezing NEELAM--Ventolin Brand only       valACYclovir (VALTREX) 1000 mg tablet Take 2 tablets (2,000 mg) by mouth 2 times daily for 1 day 4 tablet 0       Soc Hx: reviewed  Fam Hx: reviewed          Roro Low MD  Internal Medicine

## 2021-03-09 NOTE — PATIENT INSTRUCTIONS
Plan:  1. Continue with frequent nebulizer treatment   2. Azithromycin 250 mg, take 2 tablets today, then 1 tablet a day for the next 4 days (  antibiotic)   3. Prednisone 40 mg daily for 5 days   4. Schedule annual exam in the morning

## 2021-03-09 NOTE — TELEPHONE ENCOUNTER
Patient calls back to schedule appointment. She is requesting to do this a virtual appointment as she gets these symptoms regularly. Video Visit scheduled with Dr. Low for today at 5pm. Will forward to Dr. Low to confirm Video Visit is okay for appointment.

## 2021-03-16 ENCOUNTER — TELEPHONE (OUTPATIENT)
Dept: INTERNAL MEDICINE | Facility: CLINIC | Age: 51
End: 2021-03-16

## 2021-03-16 DIAGNOSIS — R07.9 CHEST PAIN, UNSPECIFIED TYPE: ICD-10-CM

## 2021-03-16 DIAGNOSIS — M79.7 FIBROMYALGIA: Primary | ICD-10-CM

## 2021-03-16 NOTE — TELEPHONE ENCOUNTER
Patient calling  She has gained 15 pounds being on pregabalin (LYRICA) 50 MG capsule and she would like to know if there is a different medication or if going to the pain clinic would be better for her. Please advise. Ok to call and nettie [phone]

## 2021-03-19 ENCOUNTER — TELEPHONE (OUTPATIENT)
Dept: PALLIATIVE MEDICINE | Facility: CLINIC | Age: 51
End: 2021-03-19

## 2021-03-20 NOTE — TELEPHONE ENCOUNTER

## 2021-03-21 ENCOUNTER — HEALTH MAINTENANCE LETTER (OUTPATIENT)
Age: 51
End: 2021-03-21

## 2021-03-22 ENCOUNTER — TELEPHONE (OUTPATIENT)
Dept: INTERNAL MEDICINE | Facility: CLINIC | Age: 51
End: 2021-03-22

## 2021-03-22 NOTE — TELEPHONE ENCOUNTER
Care Coordinator calling to report that pt has concerns about weight gain and diabetes. She is going to encourage pt to come in for a face to face morning appointment so that pt can be fasting and discuss these issues with Dr. Low. Care Coordinator wanted Dr. Low to be aware of these concerns that the patient has in case pt forgets to mention them at the appointment.

## 2021-03-23 DIAGNOSIS — R07.9 CHEST PAIN, UNSPECIFIED TYPE: ICD-10-CM

## 2021-03-23 DIAGNOSIS — R11.0 NAUSEA: ICD-10-CM

## 2021-03-24 ENCOUNTER — IMMUNIZATION (OUTPATIENT)
Dept: NURSING | Facility: CLINIC | Age: 51
End: 2021-03-24
Payer: MEDICARE

## 2021-03-24 PROCEDURE — 0001A PR COVID VAC PFIZER DIL RECON 30 MCG/0.3 ML IM: CPT

## 2021-03-24 PROCEDURE — 91300 PR COVID VAC PFIZER DIL RECON 30 MCG/0.3 ML IM: CPT

## 2021-03-24 RX ORDER — ONDANSETRON 8 MG/1
TABLET, FILM COATED ORAL
Qty: 30 TABLET | Refills: 1 | Status: SHIPPED | OUTPATIENT
Start: 2021-03-24 | End: 2021-05-14

## 2021-03-25 RX ORDER — NITROGLYCERIN 0.4 MG/1
TABLET SUBLINGUAL
Qty: 25 TABLET | Refills: 3 | Status: SHIPPED | OUTPATIENT
Start: 2021-03-25 | End: 2021-10-25

## 2021-03-29 DIAGNOSIS — I10 HTN, GOAL BELOW 140/90: ICD-10-CM

## 2021-03-31 NOTE — TELEPHONE ENCOUNTER
Pending Prescriptions:                       Disp   Refills    lisinopril (ZESTRIL) 20 MG tablet [Pharmac*90 tab*0        Sig: TAKE ONE TABLET BY MOUTH EVERY DAY    Routing refill request to provider for review/approval because:  Creatinine   Date Value Ref Range Status   11/21/2020 1.05 (H) 0.52 - 1.04 mg/dL Final     Next 5 appointments (look out 90 days)    Apr 06, 2021  7:00 AM  Office Visit with Roro Chawla MD  [facility] ([facility] [place] [facility] ) [address]  [facility] [id number] [phone]

## 2021-04-01 ENCOUNTER — DOCUMENTATION ONLY (OUTPATIENT)
Dept: CARE COORDINATION | Facility: CLINIC | Age: 51
End: 2021-04-01

## 2021-04-01 ENCOUNTER — VIRTUAL VISIT (OUTPATIENT)
Dept: PALLIATIVE MEDICINE | Facility: CLINIC | Age: 51
End: 2021-04-01
Payer: COMMERCIAL

## 2021-04-01 DIAGNOSIS — M79.7 FIBROMYALGIA: Primary | ICD-10-CM

## 2021-04-01 DIAGNOSIS — M79.18 MYOFASCIAL PAIN: ICD-10-CM

## 2021-04-01 PROCEDURE — 99203 OFFICE O/P NEW LOW 30 MIN: CPT | Mod: 95 | Performed by: PHYSICAL MEDICINE & REHABILITATION

## 2021-04-01 ASSESSMENT — PAIN SCALES - GENERAL: PAINLEVEL: EXTREME PAIN (9)

## 2021-04-01 NOTE — PROGRESS NOTES
"Swetha is a 50 year old who is being evaluated via a billable video visit.      How would you like to obtain your AVS? MyChart  If the video visit is dropped, the invitation should be resent by: Text to cell phone: .[phone]    Will anyone else be joining your video visit? No  {If patient encounters technical issues they should call [phone] :[id number]}     Esther Mae CMA   [facility]   Pain Management [place]    Video Start Time: {video visit start/end time for provider to select:152948}  Video-Visit Details    Type of service:  Video Visit    Video End Time:{video visit start/end time for provider to select:152948}    Originating Location (pt. Location): {video visit patient location:270742::\"Home\"}    Distant Location (provider location):  [facility] PAIN [facility]     Platform used for Video Visit: {Virtual Visit Platforms:185330::\"Shopzilla\"}  "

## 2021-04-01 NOTE — PATIENT INSTRUCTIONS
----------------------------------------------------------------  [facility] Number:  [phone]     Call with any questions about your care and for scheduling assistance.     Calls are returned Monday through Friday between 8 AM and 4:30 PM. We usually get back to you within 2 business days depending on the issue/request.    If we are prescribing your medications:    For opioid medication refills, call the clinic or send a [employer] message 7 days in advance.  Please include:    Name of requested medication    Name of the pharmacy.    For non-opioid medications, call your pharmacy directly to request a refill. Please allow 3-4 days to be processed.     Per [place] State Law:    All controlled substance prescriptions must be filled within 30 days of being written.      For those controlled substances allowing refills, pickup must occur within 30 days of last fill.      We believe regular attendance is key to your success in our program!      Any time you are unable to keep your appointment we ask that you call us at least 24 hours in advance to cancel.This will allow us to offer the appointment time to another patient.     Multiple missed appointments may lead to dismissal from the clinic.

## 2021-04-01 NOTE — PROGRESS NOTES
Swetha is a 50 year old who is being evaluated via a billable video visit.      How would you like to obtain your AVS? MyChart  If the video visit is dropped, the invitation should be resent by: Text to cell phone: .[phone]    Will anyone else be joining your video visit? Deana Mae CMA   [facility]    [facility] Consultation    Date of visit: 4/1/2021    Assessment:  Swetha Patterson is a 50 year old female with a past medical history significant for opioid dependence with hx of abuse, COPD, GERD, obesity, PAD, depression, anxiety, hx of intentional drug overdose.  who presents with complaints of:    1. Fibromyalgia  2. Hx of opioid abuse  3. Hx of intentional drug overdose  4. Mental Health - the patient's mental health concerns, specifically anxiety and depression, affect her experience of pain and contribute to her clinically significant distress.    Plan:  The following recommendations were given to the patient. Diagnosis, treatment options, risks, benefits, and alternatives were discussed, and all questions were answered. The patient expressed understanding of the plan for management.     I am recommending a multidisciplinary treatment plan to help this patient better manage her pain.  This includes:     1. Therapies:  Order placed to start chronic pain PT  2. Clinical Health Pain Psychologist: Therapy can help reduce physical and psychosocial triggers or reinforcers of pain by adapting thoughts, feelings and behaviors to reduce symptoms and increase quality of life.  Evidence indicates that the practice of relaxation, meditation, and mindfulness techniques can significantly affect pain levels and overall well being. Order placed.   3. Diagnostic Studies: None at this time  4. Medication Management:      1. Taper off of Lyrica.   2. Start cymbalta 20 mg x 7days and then increase to 40 mg daily.  3. Start methocarbamol 500 mg TID prn.    5. Further procedures recommended: none  6. Recommend trial of acupuncture  7. Follow up: 4 weeks    Reason for consultation:    Primary Care Provider is Roro Chawla.  Pain medications are being prescribed by BUDDY    Swetha Patterson is a 50 year old female with a history of morbid obesity, COPD, HLD, HTN, PAD, depression, anxiety, hx of intentional drug overdose, hx of suicide attempt, hx of opioid abuse who I was asked to see in consultation by Roro Low chronic pain.     Review of [place] Prescription Monitoring Program (): Today I have also reviewed the patient's history of controlled substance use, as provided by [place] licensed pharmacies and prescriber dispensers.     Review of Electronic Chart: Today I have also reviewed available medical information in the patient's medical record at [facility] ([facility]), including relevant provider notes, laboratory work, and imaging.     Swetha Patterson has not been seen at a pain clinic in the past.      Chief Complaint:    Chief Complaint   Patient presents with     Pain       Pain history:  Swetha Patterson is a 50 year old female who presents for initial evaluation of chief pain complaint of chronic pain.    Has hx of fibromyalgia. Diagnosed with this by neurology. States had blood work to rule out other conditions. Pain is mostly from feet up to hips. Sometimes feels like she can't walk. Pain then goes into lower and mid back. Arms go numb, drop things sometimes. Muscles are diffusely tender. Pain actually started about 2 years ago. Pain is constant lately. Describes it as aching mostly but sharp at times. Muscles tense up a lot.     She has low back pain with radiating pain into the right leg. Slipped and fell in the bathtub. Couple months ago. Pain started to increase about 4 days later.     Severity/Intensity: 10/10 at worst, 6/10 at best  Aggravating factors include: walking   Relieving factors include: none  Red Flags: The  "patient denies bowel or bladder incontinence, parasthesias, weakness, saddle anesthesia, unintentional weight loss, or fever/chills/sweats.       Medications:       Current pain medications:  Lyrica 50 mg TID - NH, SE weight gain  Ibuprofen 800 mg BID prn - NH         Other relevant medications:  Wellbutrin  Prednisone   Clonazepam  Lamotrigine    Current calculated MME: 0          Previous pain medications:  Cyclobenzaprine  Gabapentin - SE rash    Past Pain Treatments:  PT: No - she tries to walk as much as possible.   Pain psychologist: No  Relaxation techniques/biofeedback: No  TENs Unit: No  Injections: No, she is scared of needles.   Self-care:   none  Surgeries related to pain: No  Alternative Therapies:    Chiropractic: No   Acupuncture: No  Other: none    Diagnostic tests:  None    QTc 500 ms    EMG/Testing:  NA    Labs:   LFTs wnl    CSA and UDS due - NA    Past Medical History:  Past Medical History:   Diagnosis Date     Anxiety state, unspecified      Asthma      Chronic pain     back pain from cyst     Contact dermatitis and other eczema, due to unspecified cause      COPD (chronic obstructive pulmonary disease) (H)      Depressive disorder, not elsewhere classified      Diabetes (H)      Emphysema with chronic bronchitis (H)      Esophageal reflux      Family history of ischemic heart disease      Fibromyalgia      Gastro-oesophageal reflux disease      History of emphysema      Hoarseness      HTN, goal below 140/90      Hyperlipidemia LDL goal <130      Liver disease     \"fatty liver\"     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)      PONV (postoperative nausea and vomiting)      Rectal bleeding        Past Surgical History:  Past Surgical History:   Procedure Laterality Date     ARTHROSCOPY SHOULDER DECOMPRESSION Left 10/21/2015    Procedure: ARTHROSCOPY SHOULDER DECOMPRESSION;  Surgeon: Julien Milian MD;  Location: [facility]     BIOPSY ARTERY TEMPORAL Left 3/11/2020    Procedure: LEFT TEMPORAL " ARTERY BIOPSY;  Surgeon: Ibeth Conner MD;  Location: [facility] OR     BRONCHIAL THERMOPLASTY N/A 11/14/2014    Procedure: BRONCHIAL THERMOPLASTY;  Surgeon: Ward Whitaker MD;  [facility]     BRONCHIAL THERMOPLASTY N/A 12/19/2014    Procedure: BRONCHIAL THERMOPLASTY;  Surgeon: Ward Whitaker MD;  [facility]     BRONCHIAL THERMOPLASTY N/A 2/6/2015    Procedure: BRONCHIAL THERMOPLASTY;  Surgeon: Ward Whitaker MD;  [facility]     C APPENDECTOMY  at age 17     COLONOSCOPY N/A 11/26/2018    Procedure: COLONOSCOPY ([facility]);  Surgeon: Marshall Oakes MD;  Location: [facility] OR     COLONOSCOPY N/A 10/22/2020    Procedure: Colonoscopy;  Surgeon: Marshall Mccormick MD;  Location: [facility] OR     DISCECTOMY, FUSION CERVICAL ANTERIOR ONE LEVEL, COMBINED N/A 5/1/2018    Procedure: COMBINED DISCECTOMY, FUSION CERVICAL ANTERIOR ONE LEVEL;  1.  C5-C6 anterior cervical diskectomy and fusion.    2.  C5-C6 application of intervertebral biomechanical device for interbody fusion purposes.    3.  C5-C6 anterior instrumentation using the standard Kristin InViZia 24 mm plate with four associated bone screws, 12 mm in length.  Inferior screws are fixed.  Superior screws are va     ENT SURGERY  2015    polyps removed from vocal cords      ESOPHAGOSCOPY, GASTROSCOPY, DUODENOSCOPY (EGD), COMBINED N/A 10/22/2020    Procedure: Esophagoscopy, gastroscopy, duodenoscopy with biopsies;  Surgeon: Marshall Mccormick MD;  Location:  OR     EXCISE NODE MEDIASTINAL  4/26/2013    Procedure: EXCISE NODE MEDIASTINAL;;  Surgeon: Av Peña MD;  Location:  OR     LAPAROSCOPIC CHOLECYSTECTOMY N/A 10/19/2017    Procedure: LAPAROSCOPIC CHOLECYSTECTOMY;  LAPAROSCOPIC CHOLECYSTECTOMY;  Surgeon: Ney Jerry MD;  Location:  OR     THORACOSCOPY  4/26/2013    Procedure: THORACOSCOPY;  LEFT VIDEO ASSISTED THORACOSCOPY, RESECTION OF POSTERIOR MEDIASTINAL MASS;  Surgeon: Av Peña MD;  Location:   OR     TONSILLECTOMY  as a kid     TONSILLECTOMY         Medications:  Current Outpatient Medications   Medication Sig Dispense Refill     acetaminophen (TYLENOL) 325 MG tablet Take 3 tablets (975 mg) by mouth every 6 hours as needed for pain 50 tablet 0     albuterol (PROVENTIL) (2.5 MG/3ML) 0.083% neb solution Take 1 vial (2.5 mg) by nebulization every 6 hours as needed for shortness of breath / dyspnea or wheezing 25 vial 3     atorvastatin (LIPITOR) 80 MG tablet TAKE ONE TABLET BY MOUTH EVERY DAY 90 tablet 0     azithromycin (ZITHROMAX) 250 MG tablet Two tablets first day, then one tablet daily for four days 6 tablet 0     benzoyl peroxide (ACNE-CLEAR) 10 % external gel Apply topically 2 times daily as needed       blood glucose (ACCU-CHEK ALLYSON PLUS) test strip USE TO TEST BLOOD SUGAR ONE TIME DAILY OR AS DIRECTED 100 each 0     blood glucose (NO BRAND SPECIFIED) lancets standard Use to test blood sugar 1 times daily or as directed. 100 each 3     blood glucose (NO BRAND SPECIFIED) lancets standard Use to test blood sugar 1 time daily 100 each 1     blood glucose (NO BRAND SPECIFIED) test strip Use to test blood sugar 1 times daily or as directed.  Dispense Accu-chek Allyson Plus or per patient insurance 100 strip 3     blood glucose monitoring (NO BRAND SPECIFIED) meter device kit Use to test blood sugar 1 times daily or as directed.  Dispense Accu-chek Allyson Plus or per insurance preference 1 kit 0     blood glucose monitoring (NO BRAND SPECIFIED) meter device kit Use to test blood sugar 1 times daily or as directed. 1 kit 0     buPROPion (WELLBUTRIN XL) 150 MG 24 hr tablet TAKE ONE TABLET BY MOUTH EVERY MORNING 90 tablet 2     cetirizine HCl 10 MG CAPS Take 1 capsule (10 mg) by mouth daily as needed Takes in the spring. 90 capsule 3     clindamycin 1 % EX external gel Apply topically 2 times daily 60 g 3     clonazePAM (KLONOPIN) 1 MG tablet Take 1 tablet (1 mg) by mouth 2 times daily as needed for anxiety She  needs to see her PCP to get more tablets 5 tablet 0     cyclobenzaprine (FLEXERIL) 5 MG tablet Take 1 tablet (5 mg) by mouth 3 times daily as needed for muscle spasms 30 tablet 1     desonide (DESOWEN) 0.05 % external cream Apply topically daily       fluticasone 50 MCG/ACT NA nasal spray Spray 2 sprays in nostril daily 16 g 5     Fluticasone-Umeclidin-Vilanterol (TRELEGY ELLIPTA) 100-62.5-25 MCG/INH oral inhaler Inhale 1 puff into the lungs daily       furosemide (LASIX) 20 MG tablet TAKE ONE TABLET BY MOUTH EVERY DAY 90 tablet 0     glipiZIDE (GLUCOTROL XL) 2.5 MG 24 hr tablet TAKE ONE TABLET BY MOUTH EVERY DAY 90 tablet 1     hydrocortisone 2.5 % cream APPLY TOPICALLY TO THE AFFECTED AREA(S) TWO TIMES A DAY 30 g 1     hydrOXYzine (ATARAX) 50 MG tablet TAKE TWO TABLETS BY MOUTH THREE TIMES A DAY AS NEEDED FOR ANXIETY *MAX OF 70 TABLETS PER WEEK* 70 tablet 5     lamoTRIgine (LAMICTAL) 100 MG tablet 100 mg daily        lisinopril (ZESTRIL) 20 MG tablet TAKE ONE TABLET BY MOUTH EVERY DAY 90 tablet 0     metFORMIN (GLUCOPHAGE) 500 MG tablet TAKE ONE TABLET BY MOUTH EVERY DAY WITH BREAKFAST 90 tablet 1     montelukast (SINGULAIR) 10 MG tablet Take 1 tablet (10 mg) by mouth At Bedtime 90 tablet 4     nicotine (NICORETTE) 2 MG gum Place 1 each (2 mg) inside cheek as needed for smoking cessation 150 each 1     nitroGLYcerin (NITROSTAT) 0.4 MG sublingual tablet PLACE ONE TABLET UNDER THE TONGUE AT THE 1ST SIGN OF ATTACK. IF PAIN IS UNRELIEVED OR WORSENED 5 MINUTES AFTER 1ST DOSE, PROMPT MEDICAL ASSISTANCE IS NEEDED. MAY REPEAT EVERY 5 MINUTES UNTIL PAIN IS RELIEVED. MAX OF THREE DOSES 25 tablet 3     nystatin (MYCOSTATIN) 073238 UNIT/GM external powder Apply topically daily       omeprazole (PRILOSEC OTC) 20 MG EC tablet Take 1 tablet (20 mg) by mouth 2 times daily 180 tablet 1     ondansetron (ZOFRAN) 8 MG tablet TAKE 1/2 -1 TABLET BY MOUTH EVERY 8 HOURS AS NEEDED FOR NAUSEA 30 tablet 1     order for DME Please provide  oximeter 1 Device 0     order for DME Equipment being ordered: Digital home blood pressure monitor kit 1 Device 0     order for DME Equipment being ordered: Pulse Oxymeter 1 Device 0     polyethylene glycol (MIRALAX) 17 GM/Dose powder Take 17 g (1 capful) by mouth daily 507 g 1     predniSONE (DELTASONE) 20 MG tablet Take 1 tablet (20 mg) by mouth daily 7 tablet 0     pregabalin (LYRICA) 50 MG capsule TAKE ONE CAPSULE BY MOUTH THREE TIMES A DAY 90 capsule 1     prochlorperazine (COMPAZINE) 10 MG tablet Take 1 tablet (10 mg) by mouth every 8 hours as needed for nausea or vomiting 30 tablet 0     sulfamethoxazole-trimethoprim (BACTRIM DS) 800-160 MG tablet Take 1 tablet by mouth 2 times daily Take one tablet twice daily. For additional refills, please schedule a follow-up appointment. 180 tablet 0     terbinafine (LAMISIL) 1 % external cream Apply topically 2 times daily 30 g 2     valACYclovir (VALTREX) 1000 mg tablet Take 2 tablets (2,000 mg) by mouth 2 times daily for 1 day 4 tablet 0     VENTOLIN  (90 Base) MCG/ACT inhaler Inhale 2 puffs into the lungs every 6 hours as needed for shortness of breath / dyspnea or wheezing NEELAM--Ventolin Brand only         Allergies:     Allergies   Allergen Reactions     Codeine Nausea and Vomiting     vomiting     Cyclobenzaprine Nausea     Hydrocodone Nausea and Vomiting     Sulfa Drugs Nausea and Vomiting     Nausea     Gabapentin Rash       Family history:  Family History   Problem Relation Age of Onset     Heart Disease Mother         murmur, mi     Hypertension Mother      Cerebrovascular Disease Mother      Depression Mother      Gallbladder Disease Mother      Asthma Mother      Family History Negative Father         does not know  him     Family History Negative Brother      Heart Disease Maternal Grandfather      Asthma Maternal Grandfather      Hypertension Maternal Grandfather      Cerebrovascular Disease Maternal Grandfather      Diabetes Maternal Grandfather       Asthma Sister      Hypertension Sister      Cerebrovascular Disease Sister      Hypertension Maternal Grandmother      Cerebrovascular Disease Maternal Grandmother        Social History:  Home situation: Lives in [place], [place]. Lives with her dad who she takes care of.   Occupation/Schooling: Not working. On disability.   Tobacco use: 4 cigarettes per day.   Drug use: No  Alcohol use: No   History of chemical dependency treatment: Yes - for alcohol, 2010  Mental health admissions: Yes - 2020    DIRE Score for ongoing opioid management is calculated as follows:   Diagnosis = 1 pt (benign chronic illness; minimal objective findings; no definite diagnosis)   Intractability = 1 pt (few therapies tried; passive patient role)   Risk    Psych = 2 pts (personality dysfunction/mental illness that moderately interferes with care)    Chem Hlth = 2 pts (use of medications to cope with stress; chemical dependency in remission)   Reliability = 1 pt (medication misuse; missed appointments; rarely follows through)   Social = 2 pts (reduction in some relationships/life rolls)   (Psych + Chem hlth + Reliability + Social) = 9     Efficacy = 2 pts (moderate benefit/function; low med dose; too early/not tried meds)         DIRE Score = 11        7-13: likely NOT suitable candidate for long-term opioid analgesia       14-21: may be a suitable candidate for long-term opioid analgesia       Review of Systems:    POSTIVE IN BOLD  GENERAL: fever/chills, fatigue, general unwell feeling, weight gain/loss.  HEAD/EYES:  headache, dizziness, or vision changes.    EARS/NOSE/THROAT:  Nosebleeds, hearing loss, sinus infection, earache, tinnitus.  IMMUNE:  Allergies, cancer, immune deficiency, or infections.  SKIN:  Urticaria, rash, hives  HEME/Lymphatic:   anemia, easy bruising, easy bleeding.  RESPIRATORY:  cough, wheezing, or shortness of breath  CARDIOVASCULAR/Circulation:  Extremity edema, syncope, hypertension, tachycardia, or  angina.  GASTROINTESTINAL:  abdominal pain, nausea/emesis, diarrhea, constipation,  hematochezia, or melena.  ENDOCRINE:  Diabetes, steroid use,  thyroid disease or osteoporosis.  MUSCULOSKELETAL: neck pain, back pain, arthralgia, arthritis, or gout.  GENITOURINARY:  frequency, urgency, dysuria, difficulty voiding, hematuria or incontinence.  NEUROLOGIC:  weakness, numbness, paresthesias, seizure, tremor, stroke or memory loss.  PSYCHIATRIC:  depression, anxiety, stress, suicidal thoughts or mood swings.     Physical Exam:  Exam:  Constitutional: Well developed, well nourished, appears stated age. Tearful.  HEENT: Head atraumatic, normocephalic.   Respiratory: Breathing unlabored on room air  Psychiatric/mental status: without lethargy or stupor. Speech fluent. Appropriate affect. Mood normal. Able to follow commands without difficulty.     Musculoskeletal exam:  Gait/Station/Posture:   Normal stance, arm swing    Fabiola Chambers MD   [facility]     Video Start Time: 11:25 am  Video-Visit Details    Type of service:  Video Visit    Video End Time:11:53 am    Originating Location (pt. Location): Home    Distant Location (provider location):  [facility] [place]     Platform used for Video Visit: [employer] Magpie    BILLING TIME DOCUMENTATION:   The total TIME spent on this patient on the date of the encounter/appointment was 40 minutes.      TOTAL TIME includes:   Time spent preparing to see the patient (reviewing records and tests)  Time spent face to face (or over the phone) with the patient   Time spent ordering tests, medications, procedures and referrals  Time spent documenting clinical information in Epic

## 2021-04-02 RX ORDER — METHOCARBAMOL 500 MG/1
500 TABLET, FILM COATED ORAL 3 TIMES DAILY PRN
Qty: 120 TABLET | Refills: 0 | Status: SHIPPED | OUTPATIENT
Start: 2021-04-02 | End: 2021-04-23

## 2021-04-02 RX ORDER — DULOXETIN HYDROCHLORIDE 20 MG/1
20 CAPSULE, DELAYED RELEASE ORAL DAILY
Qty: 53 CAPSULE | Refills: 1 | Status: SHIPPED | OUTPATIENT
Start: 2021-04-02 | End: 2021-05-18

## 2021-04-02 RX ORDER — LISINOPRIL 20 MG/1
TABLET ORAL
Qty: 90 TABLET | Refills: 0 | Status: SHIPPED | OUTPATIENT
Start: 2021-04-02 | End: 2022-01-27

## 2021-04-08 ENCOUNTER — TELEPHONE (OUTPATIENT)
Dept: INTERNAL MEDICINE | Facility: CLINIC | Age: 51
End: 2021-04-08

## 2021-04-08 ENCOUNTER — MEDICAL CORRESPONDENCE (OUTPATIENT)
Dept: HEALTH INFORMATION MANAGEMENT | Facility: CLINIC | Age: 51
End: 2021-04-08

## 2021-04-08 NOTE — TELEPHONE ENCOUNTER
Melania from [facility] [place] calling for verbal orders for SN, 1 x per week for 8 weeks and 3 PRN. Call Melania at [phone]

## 2021-04-09 ENCOUNTER — TELEPHONE (OUTPATIENT)
Dept: INTERNAL MEDICINE | Facility: CLINIC | Age: 51
End: 2021-04-09

## 2021-04-09 NOTE — TELEPHONE ENCOUNTER
Called patient.  Stated she has an URI and her chest is tight with coughing up creamy colored sputum.  Nasal congestion with light yellow drainage from nose.  Having trouble breathing and has been using nebulizer and inhaler.  Denied any fever, chest pain, weakness, loss of taste or smell, rash, headache, body aches or any other symptoms.  Patient wanting to know if an antibiotic would be appropriate and needs some prednisone ordered as well.  Please advise, thanks.

## 2021-04-09 NOTE — TELEPHONE ENCOUNTER
Patient has symptoms of an URI for 2 days and is requesting an RX for z-pack and prednisone. Please send to [facility] in [place]

## 2021-04-12 NOTE — TELEPHONE ENCOUNTER
Patient calls to check on request. Informed patient that Dr. Low recommended that she schedule an appointment. Patient scheduled appointment for tomorrow.   Next 5 appointments (look out 90 days)    Apr 13, 2021 10:00 AM  SHORT with Renetta Heaton MD  [facility] [address] [facility] [address]  [phone]   May 10, 2021  8:00 AM  Office Visit with Roro Chawla MD  [facility] ([facility] ) [address]  [facility] [address]  [phone]         Patient then mentions that she has been having pain on the right side of her chest. She states that the pain goes away after taking Nitroglycerin and not having pain right now. She is not sure if the chest pain is from her URI or not, she cannot tell if it is different from previous chest pain. Advised patient if chest pain gets worse or does not respond to nitroglycerin, she needs to go to the ER. Patient verbalizes understanding.

## 2021-04-13 ENCOUNTER — OFFICE VISIT (OUTPATIENT)
Dept: URGENT CARE | Facility: URGENT CARE | Age: 51
End: 2021-04-13
Payer: COMMERCIAL

## 2021-04-13 ENCOUNTER — VIRTUAL VISIT (OUTPATIENT)
Dept: URGENT CARE | Facility: CLINIC | Age: 51
End: 2021-04-13
Payer: COMMERCIAL

## 2021-04-13 VITALS
RESPIRATION RATE: 20 BRPM | OXYGEN SATURATION: 97 % | WEIGHT: 240 LBS | BODY MASS INDEX: 40.97 KG/M2 | HEART RATE: 84 BPM | SYSTOLIC BLOOD PRESSURE: 122 MMHG | TEMPERATURE: 98.1 F | DIASTOLIC BLOOD PRESSURE: 85 MMHG | HEIGHT: 64 IN

## 2021-04-13 DIAGNOSIS — J44.1 COPD EXACERBATION (H): Primary | ICD-10-CM

## 2021-04-13 DIAGNOSIS — Z72.0 TOBACCO ABUSE: ICD-10-CM

## 2021-04-13 PROCEDURE — 99207 PR NO CHARGE LOS: CPT

## 2021-04-13 PROCEDURE — 99214 OFFICE O/P EST MOD 30 MIN: CPT | Performed by: FAMILY MEDICINE

## 2021-04-13 RX ORDER — PREDNISONE 20 MG/1
20 TABLET ORAL 2 TIMES DAILY
Qty: 10 TABLET | Refills: 0 | Status: SHIPPED | OUTPATIENT
Start: 2021-04-13 | End: 2021-04-18

## 2021-04-13 RX ORDER — AZITHROMYCIN 250 MG/1
TABLET, FILM COATED ORAL
Qty: 6 TABLET | Refills: 0 | Status: SHIPPED | OUTPATIENT
Start: 2021-04-13 | End: 2021-04-18

## 2021-04-13 ASSESSMENT — ASTHMA QUESTIONNAIRES
QUESTION_3 LAST FOUR WEEKS HOW OFTEN DID YOUR ASTHMA SYMPTOMS (WHEEZING, COUGHING, SHORTNESS OF BREATH, CHEST TIGHTNESS OR PAIN) WAKE YOU UP AT NIGHT OR EARLIER THAN USUAL IN THE MORNING: FOUR OR MORE NIGHTS A WEEK
ACT_TOTALSCORE: 7
QUESTION_5 LAST FOUR WEEKS HOW WOULD YOU RATE YOUR ASTHMA CONTROL: NOT CONTROLLED AT ALL
QUESTION_1 LAST FOUR WEEKS HOW MUCH OF THE TIME DID YOUR ASTHMA KEEP YOU FROM GETTING AS MUCH DONE AT WORK, SCHOOL OR AT HOME: SOME OF THE TIME
QUESTION_4 LAST FOUR WEEKS HOW OFTEN HAVE YOU USED YOUR RESCUE INHALER OR NEBULIZER MEDICATION (SUCH AS ALBUTEROL): THREE OR MORE TIMES PER DAY
QUESTION_2 LAST FOUR WEEKS HOW OFTEN HAVE YOU HAD SHORTNESS OF BREATH: MORE THAN ONCE A DAY
ACUTE_EXACERBATION_TODAY: NO

## 2021-04-13 ASSESSMENT — MIFFLIN-ST. JEOR: SCORE: 1688.63

## 2021-04-13 NOTE — PROGRESS NOTES
"SUBJECTIVE: Swetha Patterson is a 50 year old female presenting with a chief complaint of cough .  Onset of symptoms was day(s) ago.  Course of illness is worsening.    Severity moderate  Current and Associated symptoms: cough  Treatment measures tried include Inhaler (name: aln and neb).  Predisposing factors include tobacco use, HX of asthma and HX of COPD.    Past Medical History:   Diagnosis Date     Anxiety state, unspecified      Asthma      Chronic pain     back pain from cyst     Contact dermatitis and other eczema, due to unspecified cause      COPD (chronic obstructive pulmonary disease) (H)      Depressive disorder, not elsewhere classified      Diabetes (H)      Emphysema with chronic bronchitis (H)      Esophageal reflux      Family history of ischemic heart disease      Fibromyalgia      Gastro-oesophageal reflux disease      History of emphysema      Hoarseness      HTN, goal below 140/90      Hyperlipidemia LDL goal <130      Liver disease     \"fatty liver\"     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)      PONV (postoperative nausea and vomiting)      Rectal bleeding      Allergies   Allergen Reactions     Codeine Nausea and Vomiting     vomiting     Cyclobenzaprine Nausea     Hydrocodone Nausea and Vomiting     Sulfa Drugs Nausea and Vomiting     Nausea     Gabapentin Rash     Social History     Tobacco Use     Smoking status: Current Every Day Smoker     Packs/day: 0.50     Years: 30.00     Pack years: 15.00     Types: Cigarettes     Start date: 1/1/1985     Smokeless tobacco: Never Used     Tobacco comment: Is trying to quit, using nicotine gum and patch   Substance Use Topics     Alcohol use: No     Alcohol/week: 0.0 standard drinks       ROS:  SKIN: no rash  GI: no vomiting    OBJECTIVE:  /85   Pulse 84   Temp 98.1  F (36.7  C)   Resp 20   Ht 1.626 m (5' 4\")   Wt 108.9 kg (240 lb)   LMP 04/15/2016 (Exact Date)   SpO2 97%   BMI 41.20 kg/m  GENERAL APPEARANCE: healthy, alert and no " distress  EYES: EOMI,  PERRL, conjunctiva clear  HENT: ear canals and TM's normal.  Nose and mouth without ulcers, erythema or lesions  RESP: lungs clear to auscultation - no rales, rhonchi or wheezes  SKIN: no suspicious lesions or rashes      ICD-10-CM    1. COPD exacerbation (H)  J44.1 predniSONE (DELTASONE) 20 MG tablet     azithromycin (ZITHROMAX) 250 MG tablet   2. Tobacco abuse  Z72.0      Cont inhalers  Declines covid test  Quit tobaco  Fluids/Rest, f/u if worse/not any better

## 2021-04-13 NOTE — PROGRESS NOTES
Swetha Patterson is a 50 year old female who is being evaluated via a billable telephone/video visit.    What phone number would you like to be contacted at? [phone]  How would you like to obtain your AVS? Mail a copy    Subjective   Swetha Patterson is a 50 year old female who presents today via telephone/video for the following health issues:  HPI   Acute Illness  Acute illness concerns:   Onset/Duration: 4-5days.  Had an appointment today in clinic but was told to do a virtual visit instead.  Symptoms:  Fever: YES  Chills/Sweats: no  Headache (location?): no  Sinus Pressure: no  Conjunctivitis:  no  Ear Pain: no  Rhinorrhea: no  Congestion: no  Sore Throat: no  Cough: YES-productive of yellow sputum, with shortness of breath  Wheeze: YES  Decreased Appetite: no  Nausea: no  Vomiting: no  Diarrhea: no  Dysuria/Freq.: no  Dysuria or Hematuria: no  Fatigue/Achiness: no  Sick/Strep Exposure: Reports hx of asthma/COPD with chronic bronchitis requiring antibiotics and prednisone.  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     DIAZ (nonalcoholic steatohepatitis)     Cervical HNP C5-6     Opioid type dependence, continuous (H)     Suicide attempt (H)     Intentional drug overdose (H)     Status post cervical spinal fusion     Depression     Vocal cord polyp     HTN, goal below 140/90     COPD, moderate (H)     History of opioid abuse (H) Rx was stopped when she failed urine drug test     Claudication of both lower extremities (H)     PAD (peripheral artery disease) (H)     Jaw claudication     Morbid obesity (H)     Obstructive chronic bronchitis with exacerbation (H)     Personal history of tobacco use,  presenting hazards to health     Current Outpatient Medications   Medication     acetaminophen (TYLENOL) 325 MG tablet     albuterol (PROVENTIL) (2.5 MG/3ML) 0.083% neb solution     atorvastatin (LIPITOR) 80 MG tablet     azithromycin (ZITHROMAX) 250 MG tablet     benzoyl peroxide (ACNE-CLEAR) 10 % external gel     blood glucose (ACCU-CHEK ALLYSON PLUS) test strip     blood glucose (NO BRAND SPECIFIED) lancets standard     blood glucose (NO BRAND SPECIFIED) lancets standard     blood glucose (NO BRAND SPECIFIED) test strip     blood glucose monitoring (NO BRAND SPECIFIED) meter device kit     blood glucose monitoring (NO BRAND SPECIFIED) meter device kit     buPROPion (WELLBUTRIN XL) 150 MG 24 hr tablet     cetirizine HCl 10 MG CAPS     clindamycin 1 % EX external gel     clonazePAM (KLONOPIN) 1 MG tablet     cyclobenzaprine (FLEXERIL) 5 MG tablet     desonide (DESOWEN) 0.05 % external cream     DULoxetine (CYMBALTA) 20 MG capsule     fluticasone 50 MCG/ACT NA nasal spray     Fluticasone-Umeclidin-Vilanterol (TRELEGY ELLIPTA) 100-62.5-25 MCG/INH oral inhaler     furosemide (LASIX) 20 MG tablet     glipiZIDE (GLUCOTROL XL) 2.5 MG 24 hr tablet     hydrocortisone 2.5 % cream     hydrOXYzine (ATARAX) 50 MG tablet     lamoTRIgine (LAMICTAL) 100 MG tablet     lisinopril (ZESTRIL) 20 MG tablet     metFORMIN (GLUCOPHAGE) 500 MG tablet     methocarbamol (ROBAXIN) 500 MG tablet     montelukast (SINGULAIR) 10 MG tablet     nicotine (NICORETTE) 2 MG gum     nitroGLYcerin (NITROSTAT) 0.4 MG sublingual tablet     nystatin (MYCOSTATIN) 584308 UNIT/GM external powder     omeprazole (PRILOSEC OTC) 20 MG EC tablet     ondansetron (ZOFRAN) 8 MG tablet     order for DME     order for DME     order for DME     polyethylene glycol (MIRALAX) 17 GM/Dose powder     predniSONE (DELTASONE) 20 MG tablet     pregabalin (LYRICA) 50 MG capsule     prochlorperazine (COMPAZINE) 10 MG tablet     sulfamethoxazole-trimethoprim (BACTRIM DS) 800-160 MG  tablet     terbinafine (LAMISIL) 1 % external cream     valACYclovir (VALTREX) 1000 mg tablet     VENTOLIN  (90 Base) MCG/ACT inhaler     No current facility-administered medications for this visit.      Allergies   Allergen Reactions     Codeine Nausea and Vomiting     vomiting     Cyclobenzaprine Nausea     Hydrocodone Nausea and Vomiting     Sulfa Drugs Nausea and Vomiting     Nausea     Gabapentin Rash       Review of Systems   CONSTITUTIONAL: NEGATIVE for chills, change in weight  INTEGUMENTARY/SKIN: NEGATIVE for worrisome rashes, moles or lesions  EYES: NEGATIVE for vision changes or irritation  CV: NEGATIVE for chest pain, palpitations or peripheral edema  GI: NEGATIVE for nausea, abdominal pain, heartburn, or change in bowel habits  MUSCULOSKELETAL: NEGATIVE for significant arthralgias or myalgia  NEURO: NEGATIVE for weakness, dizziness or paresthesias      Objective    Vitals:  No vitals were obtained today due to virtual visit.  Physical Exam   healthy, alert, no distress, and cooperative.  Cough fits.  PSYCH: Alert and oriented times 3; coherent speech, normal   rate and volume, able to articulate logical thoughts, able   to abstract reason, no tangential thoughts, no hallucinations   or delusions  Her affect is normal and pleasant  RESP: No cough, no audible wheezing, able to talk in full sentences  Remainder of exam unable to be completed due to telephone visits      Assessment/Plan:  COPD exacerbation (H):  History is concerning for COPD exacerbation vs covid vs pneumonia.  Recommend further evaluation and management in the urgent care as s/he will need a lung exam, O2 sats, CXR, and/or COVID19 testing.  S/he plans to go to [facility] urgent care today.  F/u with PCP after urgent care visit.           Nay Schuler PA-C      Phone call duration: 8 minutes

## 2021-04-14 ENCOUNTER — TELEPHONE (OUTPATIENT)
Dept: CARDIOLOGY | Facility: CLINIC | Age: 51
End: 2021-04-14

## 2021-04-14 ASSESSMENT — ASTHMA QUESTIONNAIRES: ACT_TOTALSCORE: 7

## 2021-04-14 NOTE — TELEPHONE ENCOUNTER
Patient returned call and advised RN she would call her PMD to obtain COVID test prior to visit with LETTY Lesley. Patient aware that if positive or results are not back that she will need to change visit to virtual visit.

## 2021-04-14 NOTE — TELEPHONE ENCOUNTER
Patient returned call and left Vm advising RN to call her back. RN returned patient's call and left detailed VM that patient would need COVID test (based on PMD's recommendations.. see note) prior to in person visit based on her symptom presentation. RN also offered patient in VM to change the visit to a virtual visit on 4/16/21 with LETTY Lesley. RN advised patient to call this RN back to provide update on her preference to obtain covid test prior to apt or switch to virtual apt.

## 2021-04-14 NOTE — TELEPHONE ENCOUNTER
"Pt called to set up OV with cardiology due to \"cough\".  Pt saw her PCP yesterday who added prednisone and azythromycin.  Also recommended COVID test which pt declined.   Call pt back to assess symptoms.  Left message to call me back  "

## 2021-04-15 ENCOUNTER — DOCUMENTATION ONLY (OUTPATIENT)
Dept: CARDIOLOGY | Facility: CLINIC | Age: 51
End: 2021-04-15

## 2021-04-15 DIAGNOSIS — R21 RASH: Primary | ICD-10-CM

## 2021-04-15 NOTE — PROGRESS NOTES
Note from  - for documentation.     ---- Message -----   From: María Boyd   Sent: 4/15/2021  12:08 PM CDT   To: Cami Rowell RN   Subject: [facility] visit 4.16                                   Hi Cami, Pt said she would not r.s [facility]'s appt to virtual as it wouldn't do any good. Pt said this is ridiculous as she is having a rapid covid test & the results would be back in time for the visit. She said she would just go to urgent care or ER. I asked her I she would like Tomy to call her? She said no. I asked her if I could change it to  virtual just in case she would need it & she could always cxld it if she didn't. She said no. I said ok I will cxl this appt. I just wanted to document this for her chart & I wasn't sure how to do it?  Thank you, María

## 2021-04-16 RX ORDER — NYSTATIN 100000 [USP'U]/G
POWDER TOPICAL
Qty: 45 G | Refills: 0 | Status: SHIPPED | OUTPATIENT
Start: 2021-04-16 | End: 2021-08-17

## 2021-04-20 ENCOUNTER — IMMUNIZATION (OUTPATIENT)
Dept: NURSING | Facility: CLINIC | Age: 51
End: 2021-04-20
Attending: INTERNAL MEDICINE
Payer: COMMERCIAL

## 2021-04-20 ENCOUNTER — TELEPHONE (OUTPATIENT)
Dept: INTERNAL MEDICINE | Facility: CLINIC | Age: 51
End: 2021-04-20

## 2021-04-20 DIAGNOSIS — Z53.9 DIAGNOSIS NOT YET DEFINED: Primary | ICD-10-CM

## 2021-04-20 PROCEDURE — 91300 PR COVID VAC PFIZER DIL RECON 30 MCG/0.3 ML IM: CPT

## 2021-04-20 PROCEDURE — 0002A PR COVID VAC PFIZER DIL RECON 30 MCG/0.3 ML IM: CPT

## 2021-04-20 PROCEDURE — G0179 MD RECERTIFICATION HHA PT: HCPCS | Performed by: INTERNAL MEDICINE

## 2021-04-23 ENCOUNTER — TELEPHONE (OUTPATIENT)
Dept: INTERNAL MEDICINE | Facility: CLINIC | Age: 51
End: 2021-04-23

## 2021-04-23 DIAGNOSIS — M79.18 MYOFASCIAL PAIN: ICD-10-CM

## 2021-04-23 DIAGNOSIS — J06.9 UPPER RESPIRATORY TRACT INFECTION, UNSPECIFIED TYPE: Primary | ICD-10-CM

## 2021-04-23 RX ORDER — PREDNISONE 20 MG/1
20 TABLET ORAL DAILY
Qty: 5 TABLET | Refills: 0 | Status: SHIPPED | OUTPATIENT
Start: 2021-04-23 | End: 2021-05-06

## 2021-04-23 RX ORDER — METHOCARBAMOL 500 MG/1
500 TABLET, FILM COATED ORAL 3 TIMES DAILY PRN
Qty: 120 TABLET | Refills: 0 | Status: SHIPPED | OUTPATIENT
Start: 2021-04-23 | End: 2021-10-06 | Stop reason: ALTCHOICE

## 2021-04-23 RX ORDER — LEVOFLOXACIN 500 MG/1
500 TABLET, FILM COATED ORAL DAILY
Qty: 7 TABLET | Refills: 0 | Status: SHIPPED | OUTPATIENT
Start: 2021-04-23 | End: 2021-05-20

## 2021-04-23 NOTE — TELEPHONE ENCOUNTER
Signed Prescriptions:                        Disp   Refills    methocarbamol (ROBAXIN) 500 MG tablet      120 ta*0        Sig: Take 1 tablet (500 mg) by mouth 3 times daily as           needed for muscle spasms  Authorizing Provider: RASHIDA DEL TORO MD  [facility]

## 2021-04-23 NOTE — TELEPHONE ENCOUNTER
Sent levaquin and prednisone per patient request    She soul watch for hypoglycemia being on glipizide

## 2021-04-23 NOTE — TELEPHONE ENCOUNTER
Call received from [facility] stating patient was seen in urgent care on 4/13 for cough. Patient was started on zpack. Patient feels she had slight improvement. States usually zpack works well but this time it didn't help as much as it usually does. States she continues to have chest tightness, cough productive of light brown colored sputum and shortness of breath with activity or coughing. Patient has frequent upper respiratory infections. Please advise if something additional can be ordered. Patient states usually when the zpack doesn't work Dr. Low prescribes Levaquin and Prednisone.

## 2021-04-23 NOTE — TELEPHONE ENCOUNTER
Received fax request from [facility] pharmacy requesting refill(s) for methocarbamol (ROBAXIN) 500 MG tablet    Last refilled on 04/02/21    Pt last seen on 04/01/21  Next appt scheduled for : none    Will facilitate refill.

## 2021-04-26 ENCOUNTER — TELEPHONE (OUTPATIENT)
Dept: PALLIATIVE MEDICINE | Facility: CLINIC | Age: 51
End: 2021-04-26

## 2021-04-26 NOTE — TELEPHONE ENCOUNTER
Prior Authorization Retail Medication Request    Medication/Dose: methocarbamol (ROBAXIN) 500 MG tablet  ICD code (if different than what is on RX):    Previously Tried and Failed:    Rationale:      Insurance Name:  [employer]  Insurance ID:  [id number]    KEY: [id number]    Pharmacy Information (if different than what is on RX)    [facility] [address] [place] [address]  [address]  [place] [address]  Phone: [phone] Fax: [phone]    Will Route to NOE Beasley.      Mamie Pineda MA  [facility] [place]

## 2021-04-27 DIAGNOSIS — L73.2 HIDRADENITIS SUPPURATIVA: ICD-10-CM

## 2021-04-27 RX ORDER — SULFAMETHOXAZOLE/TRIMETHOPRIM 800-160 MG
1 TABLET ORAL 2 TIMES DAILY
Qty: 180 TABLET | Refills: 0 | Status: CANCELLED | OUTPATIENT
Start: 2021-04-27

## 2021-04-27 NOTE — TELEPHONE ENCOUNTER
Central Prior Authorization Team   Phone: [phone]      PA Initiation    Medication: methocarbamol (ROBAXIN) 500 MG tablet  Insurance Company: [employer] - Phone [phone] Fax [phone]  Pharmacy Filling the Rx: [facility] [place]  [place] - [address]  Filling Pharmacy Phone: [phone]  Filling Pharmacy Fax:    Start Date: 4/27/2021

## 2021-04-27 NOTE — TELEPHONE ENCOUNTER
Prior Authorization Approval    Authorization Effective Date: 3/28/2021  Authorization Expiration Date: 4/27/2022  Medication: methocarbamol (ROBAXIN) 500 MG tablet-APPROVED  Approved Dose/Quantity:   Reference #:     Insurance Company: [employer] - Phone [phone] Fax [phone]  Expected CoPay:       CoPay Card Available:      Foundation Assistance Needed:    Which Pharmacy is filling the prescription (Not needed for infusion/clinic administered): [facility] [address] [place] - [address] [facility]  Pharmacy Notified: Yes  Patient Notified: No    Pharmacy will notify patient when medication is ready.

## 2021-04-30 DIAGNOSIS — J44.1 COPD EXACERBATION (H): Primary | ICD-10-CM

## 2021-04-30 DIAGNOSIS — E78.5 HYPERLIPIDEMIA LDL GOAL <130: Chronic | ICD-10-CM

## 2021-04-30 RX ORDER — ATORVASTATIN CALCIUM 80 MG/1
TABLET, FILM COATED ORAL
Qty: 90 TABLET | Refills: 0 | Status: SHIPPED | OUTPATIENT
Start: 2021-04-30 | End: 2021-08-12

## 2021-04-30 NOTE — TELEPHONE ENCOUNTER
Routing refill request to provider for review/approval because:  Labs not current:  LDL          Pending Prescriptions:                       Disp   Refills    atorvastatin (LIPITOR) 80 MG tablet [Pharm*90 tab*0        Sig: TAKE ONE TABLET BY MOUTH EVERY DAY        Clem Weir RN

## 2021-04-30 NOTE — TELEPHONE ENCOUNTER
Pending Prescriptions:                       Disp   Refills    Fluticasone-Umeclidin-Vilanterol (TRELEGY*28 each3            Sig: Inhale 1 puff into the lungs daily    Routing refill request to provider for review/approval because:  Medication is reported/historical

## 2021-05-03 ENCOUNTER — TELEPHONE (OUTPATIENT)
Dept: INTERNAL MEDICINE | Facility: CLINIC | Age: 51
End: 2021-05-03

## 2021-05-03 NOTE — TELEPHONE ENCOUNTER
Patient calls and states her cough is not improving after a Z-Saurabh, Antibiotic, and two rounds of Prednisone. Worse at night and patient has not slept.  Cough is persistent.  Should patient see Pulmonologist?  If so can you refer because they would not get her in for two or three months.Patient states she coughs until she gags.  Please advise.

## 2021-05-04 NOTE — TELEPHONE ENCOUNTER
Patient refuses UC, has been scheduled to see primary care provider Thursday 5/6 in clinic.  She will also discuss pulmonary referral at that appt.  States she can be seen again at [facility] pulmonary but they will need a new referral.  SERJIO Chandra R.N.

## 2021-05-04 NOTE — TELEPHONE ENCOUNTER
She needs an appointment in the system or UC    She has pulmonologist, but she doesn't keep the appointments

## 2021-05-06 ENCOUNTER — ANCILLARY PROCEDURE (OUTPATIENT)
Dept: GENERAL RADIOLOGY | Facility: CLINIC | Age: 51
End: 2021-05-06
Attending: INTERNAL MEDICINE
Payer: COMMERCIAL

## 2021-05-06 ENCOUNTER — TELEPHONE (OUTPATIENT)
Dept: INTERNAL MEDICINE | Facility: CLINIC | Age: 51
End: 2021-05-06

## 2021-05-06 ENCOUNTER — OFFICE VISIT (OUTPATIENT)
Dept: INTERNAL MEDICINE | Facility: CLINIC | Age: 51
End: 2021-05-06
Payer: COMMERCIAL

## 2021-05-06 VITALS
RESPIRATION RATE: 16 BRPM | HEART RATE: 95 BPM | BODY MASS INDEX: 40.97 KG/M2 | WEIGHT: 240 LBS | TEMPERATURE: 98.7 F | HEIGHT: 64 IN | SYSTOLIC BLOOD PRESSURE: 141 MMHG | OXYGEN SATURATION: 97 % | DIASTOLIC BLOOD PRESSURE: 84 MMHG

## 2021-05-06 DIAGNOSIS — R06.02 SOB (SHORTNESS OF BREATH): ICD-10-CM

## 2021-05-06 DIAGNOSIS — J44.1 COPD EXACERBATION (H): ICD-10-CM

## 2021-05-06 DIAGNOSIS — J44.1 COPD EXACERBATION (H): Primary | ICD-10-CM

## 2021-05-06 DIAGNOSIS — R05.3 CHRONIC COUGH: ICD-10-CM

## 2021-05-06 LAB
SARS-COV-2 RNA RESP QL NAA+PROBE: NORMAL
SPECIMEN SOURCE: NORMAL

## 2021-05-06 PROCEDURE — 71046 X-RAY EXAM CHEST 2 VIEWS: CPT | Performed by: RADIOLOGY

## 2021-05-06 PROCEDURE — U0005 INFEC AGEN DETEC AMPLI PROBE: HCPCS | Performed by: INTERNAL MEDICINE

## 2021-05-06 PROCEDURE — U0003 INFECTIOUS AGENT DETECTION BY NUCLEIC ACID (DNA OR RNA); SEVERE ACUTE RESPIRATORY SYNDROME CORONAVIRUS 2 (SARS-COV-2) (CORONAVIRUS DISEASE [COVID-19]), AMPLIFIED PROBE TECHNIQUE, MAKING USE OF HIGH THROUGHPUT TECHNOLOGIES AS DESCRIBED BY CMS-2020-01-R: HCPCS | Performed by: INTERNAL MEDICINE

## 2021-05-06 PROCEDURE — 99214 OFFICE O/P EST MOD 30 MIN: CPT | Performed by: INTERNAL MEDICINE

## 2021-05-06 RX ORDER — PREDNISONE 10 MG/1
TABLET ORAL
Qty: 42 TABLET | Refills: 0 | Status: SHIPPED | OUTPATIENT
Start: 2021-05-06 | End: 2021-09-24

## 2021-05-06 ASSESSMENT — MIFFLIN-ST. JEOR: SCORE: 1683.63

## 2021-05-06 NOTE — PROGRESS NOTES
Patient's instructions / PLAN:                                                        Plan:  1. Prednisone  10 mg tablet  -- take 2 tablets twice a day for 3 days then  -- take 2 tablets daily for 10 days, then   -- take 1 tab daily for 10 days  2. Do the nebulizer 4 times a day   3. Chest -  XRay today - [address]   4. Chest CT - To schedule this test you may call Scheduling center at [phone]    5. Covid test - today         ASSESSMENT & PLAN:                                                      (J44.1) COPD exacerbation (H)  (primary encounter diagnosis)  Comment:   Plan: predniSONE (DELTASONE) 10 MG tablet, XR Chest 2        Views, CT Chest w/o Contrast, PULMONARY         MEDICINE REFERRAL, Symptomatic COVID-19 Virus         (Coronavirus) by PCR, SARS-CoV-2 COVID-19 Virus        (Coronavirus) by PCR            (R05) Chronic cough  Comment:   Plan: predniSONE (DELTASONE) 10 MG tablet, XR Chest 2        Views, CT Chest w/o Contrast, PULMONARY         MEDICINE REFERRAL, Symptomatic COVID-19 Virus         (Coronavirus) by PCR            (R06.02) SOB (shortness of breath)  Comment:   Plan: predniSONE (DELTASONE) 10 MG tablet, XR Chest 2        Views, CT Chest w/o Contrast, PULMONARY         MEDICINE REFERRAL, Symptomatic COVID-19 Virus         (Coronavirus) by PCR                Chief Complaint:                                                       breathing    SUBJECTIVE:                                                    History of present illness     Breathing prob  -- for more than a month after the first Covid vaccine  -- coughing, wheezes, chest tightness   -- last day of Prednisone - last week    Form for diet through the [facility]     ROS:                                                      ROS: negative for fever, chills,  chest pain, vomiting, abdominal pain, leg swelling pos for resp symp asa above    OBJECTIVE:                                                    Physical Exam :    Blood pressure (!)  "141/84, pulse 95, temperature 98.7  F (37.1  C), temperature source Oral, resp. rate 16, height 1.626 m (5' 4\"), weight 108.9 kg (240 lb), last menstrual period 04/15/2016, SpO2 97 %, not currently breastfeeding.   NAD, appears uncomfortable w frequent cough  Skin: no rashes   Neck: supple, no JVD, No thyroidmegaly. Lymph nodes nonpalpable cervical and supraclavicular.  Chest: clear to auscultation bilaterally, good respiratory effort  Heart: S1 S2, RRR, no mgr appreciated  Abdomen: soft, not tender,   Extremities: no edema,   Neurologic: A, Ox3, no focal signs appreciated    PMHx: reviewed  Past Medical History:   Diagnosis Date     Anxiety state, unspecified      Asthma      Chronic pain     back pain from cyst     Contact dermatitis and other eczema, due to unspecified cause      COPD (chronic obstructive pulmonary disease) (H)      Depressive disorder, not elsewhere classified      Diabetes (H)      Emphysema with chronic bronchitis (H)      Esophageal reflux      Family history of ischemic heart disease      Fibromyalgia      Gastro-oesophageal reflux disease      History of emphysema      Hoarseness      HTN, goal below 140/90      Hyperlipidemia LDL goal <130      Liver disease     \"fatty liver\"     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)      PONV (postoperative nausea and vomiting)      Rectal bleeding       PSHx: reviewed  Past Surgical History:   Procedure Laterality Date     ARTHROSCOPY SHOULDER DECOMPRESSION Left 10/21/2015    Procedure: ARTHROSCOPY SHOULDER DECOMPRESSION;  Surgeon: Julien Milian MD;  Location: [facility] OR     BIOPSY ARTERY TEMPORAL Left 3/11/2020    Procedure: LEFT TEMPORAL ARTERY BIOPSY;  Surgeon: Ibeth Conner MD;  Location: [facility] OR     BRONCHIAL THERMOPLASTY N/A 11/14/2014    Procedure: BRONCHIAL THERMOPLASTY;  Surgeon: Ward Whitaker MD;  Location: [facility]     BRONCHIAL THERMOPLASTY N/A 12/19/2014    Procedure: BRONCHIAL THERMOPLASTY;  Surgeon: Ward Whitaker, " MD;  [facility]: [facility]     BRONCHIAL THERMOPLASTY N/A 2/6/2015    Procedure: BRONCHIAL THERMOPLASTY;  Surgeon: Ward Whitaker MD;  Location: [facility]     C APPENDECTOMY  at age 17     COLONOSCOPY N/A 11/26/2018    Procedure: COLONOSCOPY (Formerly [facility]);  Surgeon: Marshall Oakes MD;  Location: [facility] OR     COLONOSCOPY N/A 10/22/2020    Procedure: Colonoscopy;  Surgeon: Marshall Mccormick MD;  Location: [facility] OR     DISCECTOMY, FUSION CERVICAL ANTERIOR ONE LEVEL, COMBINED N/A 5/1/2018    Procedure: COMBINED DISCECTOMY, FUSION CERVICAL ANTERIOR ONE LEVEL;  1.  C5-C6 anterior cervical diskectomy and fusion.    2.  C5-C6 application of intervertebral biomechanical device for interbody fusion purposes.    3.  C5-C6 anterior instrumentation using the standard Kristin InViZia 24 mm plate with four associated bone screws, 12 mm in length.  Inferior screws are fixed.  Superior screws are va     ENT SURGERY  2015    polyps removed from vocal cords      ESOPHAGOSCOPY, GASTROSCOPY, DUODENOSCOPY (EGD), COMBINED N/A 10/22/2020    Procedure: Esophagoscopy, gastroscopy, duodenoscopy with biopsies;  Surgeon: Marshall Mccormick MD;  Location:  OR     EXCISE NODE MEDIASTINAL  4/26/2013    Procedure: EXCISE NODE MEDIASTINAL;;  Surgeon: Av Peña MD;  Location:  OR     LAPAROSCOPIC CHOLECYSTECTOMY N/A 10/19/2017    Procedure: LAPAROSCOPIC CHOLECYSTECTOMY;  LAPAROSCOPIC CHOLECYSTECTOMY;  Surgeon: Ney Jerry MD;  Location:  OR     THORACOSCOPY  4/26/2013    Procedure: THORACOSCOPY;  LEFT VIDEO ASSISTED THORACOSCOPY, RESECTION OF POSTERIOR MEDIASTINAL MASS;  Surgeon: Av Peña MD;  Location:  OR     TONSILLECTOMY  as a kid     TONSILLECTOMY          Meds: reviewed  Current Outpatient Medications   Medication Sig Dispense Refill     albuterol (PROVENTIL) (2.5 MG/3ML) 0.083% neb solution Take 1 vial (2.5 mg) by nebulization every 6 hours as needed for shortness of breath / dyspnea or wheezing 25 vial 3      atorvastatin (LIPITOR) 80 MG tablet TAKE ONE TABLET BY MOUTH EVERY DAY 90 tablet 0     blood glucose (ACCU-CHEK ALLYSON PLUS) test strip USE TO TEST BLOOD SUGAR ONE TIME DAILY OR AS DIRECTED 100 each 0     blood glucose (NO BRAND SPECIFIED) lancets standard Use to test blood sugar 1 times daily or as directed. 100 each 3     blood glucose (NO BRAND SPECIFIED) lancets standard Use to test blood sugar 1 time daily 100 each 1     blood glucose (NO BRAND SPECIFIED) test strip Use to test blood sugar 1 times daily or as directed.  Dispense Accu-chek Allyson Plus or per patient insurance 100 strip 3     blood glucose monitoring (NO BRAND SPECIFIED) meter device kit Use to test blood sugar 1 times daily or as directed.  Dispense Accu-chek Allyson Plus or per insurance preference 1 kit 0     buPROPion (WELLBUTRIN XL) 150 MG 24 hr tablet TAKE ONE TABLET BY MOUTH EVERY MORNING 90 tablet 2     cetirizine HCl 10 MG CAPS Take 1 capsule (10 mg) by mouth daily as needed Takes in the spring. 90 capsule 3     clonazePAM (KLONOPIN) 1 MG tablet Take 1 tablet (1 mg) by mouth 2 times daily as needed for anxiety She needs to see her PCP to get more tablets 5 tablet 0     desonide (DESOWEN) 0.05 % external cream Apply topically daily       DULoxetine (CYMBALTA) 20 MG capsule Take 1 capsule (20 mg) by mouth daily X 7 days and if well tolerated increase to 40 mg daily. 53 capsule 1     Fluticasone-Umeclidin-Vilanterol (TRELEGY ELLIPTA) 100-62.5-25 MCG/INH oral inhaler Inhale 1 puff into the lungs daily 28 each 3     furosemide (LASIX) 20 MG tablet TAKE ONE TABLET BY MOUTH EVERY DAY 90 tablet 0     glipiZIDE (GLUCOTROL XL) 2.5 MG 24 hr tablet TAKE ONE TABLET BY MOUTH EVERY DAY 90 tablet 1     hydrocortisone 2.5 % cream APPLY TOPICALLY TO THE AFFECTED AREA(S) TWO TIMES A DAY 30 g 1     hydrOXYzine (ATARAX) 50 MG tablet TAKE TWO TABLETS BY MOUTH THREE TIMES A DAY AS NEEDED FOR ANXIETY *MAX OF 70 TABLETS PER WEEK* 70 tablet 5     lamoTRIgine  (LAMICTAL) 100 MG tablet 100 mg daily        levofloxacin (LEVAQUIN) 500 MG tablet Take 1 tablet (500 mg) by mouth daily 7 tablet 0     lisinopril (ZESTRIL) 20 MG tablet TAKE ONE TABLET BY MOUTH EVERY DAY 90 tablet 0     metFORMIN (GLUCOPHAGE) 500 MG tablet TAKE ONE TABLET BY MOUTH EVERY DAY WITH BREAKFAST 90 tablet 1     methocarbamol (ROBAXIN) 500 MG tablet Take 1 tablet (500 mg) by mouth 3 times daily as needed for muscle spasms 120 tablet 0     montelukast (SINGULAIR) 10 MG tablet Take 1 tablet (10 mg) by mouth At Bedtime 90 tablet 4     nitroGLYcerin (NITROSTAT) 0.4 MG sublingual tablet PLACE ONE TABLET UNDER THE TONGUE AT THE 1ST SIGN OF ATTACK. IF PAIN IS UNRELIEVED OR WORSENED 5 MINUTES AFTER 1ST DOSE, PROMPT MEDICAL ASSISTANCE IS NEEDED. MAY REPEAT EVERY 5 MINUTES UNTIL PAIN IS RELIEVED. MAX OF THREE DOSES 25 tablet 3     nystatin (MYCOSTATIN) 125591 UNIT/GM external powder APPLY TOPICALLY TWO TIMES A DAY AS NEEDED FOR SKIN RASH 45 g 0     omeprazole 20 MG tablet TAKE ONE TABLET BY MOUTH TWICE A DAY 60 tablet 0     ondansetron (ZOFRAN) 8 MG tablet TAKE 1/2 -1 TABLET BY MOUTH EVERY 8 HOURS AS NEEDED FOR NAUSEA 30 tablet 1     order for DME Please provide oximeter 1 Device 0     order for DME Equipment being ordered: Digital home blood pressure monitor kit 1 Device 0     order for DME Equipment being ordered: Pulse Oxymeter 1 Device 0     sulfamethoxazole-trimethoprim (BACTRIM DS) 800-160 MG tablet Take 1 tablet by mouth 2 times daily Take one tablet twice daily. For additional refills, please schedule a follow-up appointment. 180 tablet 1     terbinafine (LAMISIL) 1 % external cream Apply topically 2 times daily 30 g 2     VENTOLIN  (90 Base) MCG/ACT inhaler Inhale 2 puffs into the lungs every 6 hours as needed for shortness of breath / dyspnea or wheezing NEELAM--Ventolin Brand only       acetaminophen (TYLENOL) 325 MG tablet Take 3 tablets (975 mg) by mouth every 6 hours as needed for pain (Patient not  taking: Reported on 5/6/2021) 50 tablet 0     benzoyl peroxide (ACNE-CLEAR) 10 % external gel Apply topically 2 times daily as needed       blood glucose monitoring (NO BRAND SPECIFIED) meter device kit Use to test blood sugar 1 times daily or as directed. 1 kit 0     clindamycin 1 % EX external gel Apply topically 2 times daily (Patient not taking: Reported on 5/6/2021) 60 g 3     cyclobenzaprine (FLEXERIL) 5 MG tablet Take 1 tablet (5 mg) by mouth 3 times daily as needed for muscle spasms (Patient not taking: Reported on 5/6/2021) 30 tablet 1     fluticasone 50 MCG/ACT NA nasal spray Spray 2 sprays in nostril daily (Patient not taking: Reported on 5/6/2021) 16 g 5     nicotine (NICORETTE) 2 MG gum Place 1 each (2 mg) inside cheek as needed for smoking cessation (Patient not taking: Reported on 5/6/2021) 150 each 1     polyethylene glycol (MIRALAX) 17 GM/Dose powder Take 17 g (1 capful) by mouth daily (Patient not taking: Reported on 5/6/2021) 507 g 1     predniSONE (DELTASONE) 20 MG tablet Take 1 tablet (20 mg) by mouth daily 7 tablet 0     prochlorperazine (COMPAZINE) 10 MG tablet Take 1 tablet (10 mg) by mouth every 8 hours as needed for nausea or vomiting 30 tablet 0     valACYclovir (VALTREX) 1000 mg tablet Take 2 tablets (2,000 mg) by mouth 2 times daily for 1 day 4 tablet 0       Soc Hx: reviewed  Fam Hx: reviewed          Roro Low MD  Internal Medicine

## 2021-05-07 DIAGNOSIS — R60.0 BILATERAL LEG EDEMA: ICD-10-CM

## 2021-05-07 LAB
LABORATORY COMMENT REPORT: NORMAL
SARS-COV-2 RNA RESP QL NAA+PROBE: NEGATIVE
SPECIMEN SOURCE: NORMAL

## 2021-05-07 RX ORDER — FUROSEMIDE 20 MG
TABLET ORAL
Qty: 90 TABLET | Refills: 0 | Status: SHIPPED | OUTPATIENT
Start: 2021-05-07 | End: 2021-06-07

## 2021-05-07 NOTE — TELEPHONE ENCOUNTER
Pending Prescriptions:                       Disp   Refills    furosemide (LASIX) 20 MG tablet [Pharmacy *90 tab*0        Sig: TAKE ONE TABLET BY MOUTH EVERY DAY    Routing refill request to provider for review/approval because:  BP Readings from Last 3 Encounters:   05/06/21 (!) 141/84   04/13/21 122/85   11/22/20 103/63

## 2021-05-12 ENCOUNTER — TRANSFERRED RECORDS (OUTPATIENT)
Dept: HEALTH INFORMATION MANAGEMENT | Facility: CLINIC | Age: 51
End: 2021-05-12

## 2021-05-12 ENCOUNTER — HOSPITAL ENCOUNTER (EMERGENCY)
Facility: CLINIC | Age: 51
Discharge: HOME | End: 2021-05-12
Attending: PHYSICIAN ASSISTANT | Admitting: PHYSICIAN ASSISTANT
Payer: COMMERCIAL

## 2021-05-12 ENCOUNTER — APPOINTMENT (OUTPATIENT)
Dept: CT IMAGING | Facility: CLINIC | Age: 51
End: 2021-05-12
Attending: PHYSICIAN ASSISTANT
Payer: COMMERCIAL

## 2021-05-12 VITALS
OXYGEN SATURATION: 94 % | TEMPERATURE: 98 F | HEART RATE: 65 BPM | WEIGHT: 240 LBS | BODY MASS INDEX: 41.2 KG/M2 | DIASTOLIC BLOOD PRESSURE: 77 MMHG | SYSTOLIC BLOOD PRESSURE: 141 MMHG | RESPIRATION RATE: 16 BRPM

## 2021-05-12 DIAGNOSIS — N20.0 KIDNEY STONES: ICD-10-CM

## 2021-05-12 DIAGNOSIS — R10.13 ABDOMINAL PAIN, EPIGASTRIC: ICD-10-CM

## 2021-05-12 LAB
ALBUMIN SERPL-MCNC: 3.5 G/DL (ref 3.4–5)
ALP SERPL-CCNC: 149 U/L (ref 40–150)
ALT SERPL W P-5'-P-CCNC: 60 U/L (ref 0–50)
ANION GAP SERPL CALCULATED.3IONS-SCNC: 5 MMOL/L (ref 3–14)
AST SERPL W P-5'-P-CCNC: 39 U/L (ref 0–45)
BASOPHILS # BLD AUTO: 0 10E9/L (ref 0–0.2)
BASOPHILS NFR BLD AUTO: 0.3 %
BILIRUB SERPL-MCNC: 0.4 MG/DL (ref 0.2–1.3)
BUN SERPL-MCNC: 24 MG/DL (ref 7–30)
CALCIUM SERPL-MCNC: 8.6 MG/DL (ref 8.5–10.1)
CHLORIDE SERPL-SCNC: 105 MMOL/L (ref 94–109)
CO2 SERPL-SCNC: 26 MMOL/L (ref 20–32)
CREAT SERPL-MCNC: 0.98 MG/DL (ref 0.52–1.04)
DIFFERENTIAL METHOD BLD: ABNORMAL
EOSINOPHIL # BLD AUTO: 0 10E9/L (ref 0–0.7)
EOSINOPHIL NFR BLD AUTO: 0 %
ERYTHROCYTE [DISTWIDTH] IN BLOOD BY AUTOMATED COUNT: 17.4 % (ref 10–15)
GFR SERPL CREATININE-BSD FRML MDRD: 66 ML/MIN/{1.73_M2}
GLUCOSE SERPL-MCNC: 78 MG/DL (ref 70–99)
HCG SERPL QL: NEGATIVE
HCT VFR BLD AUTO: 44 % (ref 35–47)
HGB BLD-MCNC: 13.8 G/DL (ref 11.7–15.7)
IMM GRANULOCYTES # BLD: 0.2 10E9/L (ref 0–0.4)
IMM GRANULOCYTES NFR BLD: 1.2 %
LIPASE SERPL-CCNC: 169 U/L (ref 73–393)
LYMPHOCYTES # BLD AUTO: 3.9 10E9/L (ref 0.8–5.3)
LYMPHOCYTES NFR BLD AUTO: 32.6 %
MCH RBC QN AUTO: 26.4 PG (ref 26.5–33)
MCHC RBC AUTO-ENTMCNC: 31.4 G/DL (ref 31.5–36.5)
MCV RBC AUTO: 84 FL (ref 78–100)
MONOCYTES # BLD AUTO: 0.8 10E9/L (ref 0–1.3)
MONOCYTES NFR BLD AUTO: 6.2 %
NEUTROPHILS # BLD AUTO: 7.2 10E9/L (ref 1.6–8.3)
NEUTROPHILS NFR BLD AUTO: 59.7 %
NRBC # BLD AUTO: 0 10*3/UL
NRBC BLD AUTO-RTO: 0 /100
PLATELET # BLD AUTO: 387 10E9/L (ref 150–450)
POTASSIUM SERPL-SCNC: 3.8 MMOL/L (ref 3.4–5.3)
PROT SERPL-MCNC: 7.5 G/DL (ref 6.8–8.8)
RBC # BLD AUTO: 5.22 10E12/L (ref 3.8–5.2)
SODIUM SERPL-SCNC: 136 MMOL/L (ref 133–144)
TROPONIN I SERPL-MCNC: <0.015 UG/L (ref 0–0.04)
WBC # BLD AUTO: 12 10E9/L (ref 4–11)

## 2021-05-12 PROCEDURE — 96376 TX/PRO/DX INJ SAME DRUG ADON: CPT

## 2021-05-12 PROCEDURE — 99285 EMERGENCY DEPT VISIT HI MDM: CPT | Mod: 25

## 2021-05-12 PROCEDURE — 250N000011 HC RX IP 250 OP 636: Performed by: PHYSICIAN ASSISTANT

## 2021-05-12 PROCEDURE — 84484 ASSAY OF TROPONIN QUANT: CPT | Performed by: PHYSICIAN ASSISTANT

## 2021-05-12 PROCEDURE — 258N000003 HC RX IP 258 OP 636: Performed by: PHYSICIAN ASSISTANT

## 2021-05-12 PROCEDURE — 250N000009 HC RX 250: Performed by: PHYSICIAN ASSISTANT

## 2021-05-12 PROCEDURE — 84703 CHORIONIC GONADOTROPIN ASSAY: CPT | Performed by: PHYSICIAN ASSISTANT

## 2021-05-12 PROCEDURE — 80053 COMPREHEN METABOLIC PANEL: CPT | Performed by: PHYSICIAN ASSISTANT

## 2021-05-12 PROCEDURE — 250N000013 HC RX MED GY IP 250 OP 250 PS 637: Mod: GY | Performed by: PHYSICIAN ASSISTANT

## 2021-05-12 PROCEDURE — 96361 HYDRATE IV INFUSION ADD-ON: CPT

## 2021-05-12 PROCEDURE — 96374 THER/PROPH/DIAG INJ IV PUSH: CPT | Mod: 59

## 2021-05-12 PROCEDURE — 96375 TX/PRO/DX INJ NEW DRUG ADDON: CPT

## 2021-05-12 PROCEDURE — 83690 ASSAY OF LIPASE: CPT | Performed by: PHYSICIAN ASSISTANT

## 2021-05-12 PROCEDURE — 74177 CT ABD & PELVIS W/CONTRAST: CPT

## 2021-05-12 PROCEDURE — C9113 INJ PANTOPRAZOLE SODIUM, VIA: HCPCS | Performed by: PHYSICIAN ASSISTANT

## 2021-05-12 PROCEDURE — 85025 COMPLETE CBC W/AUTO DIFF WBC: CPT | Performed by: PHYSICIAN ASSISTANT

## 2021-05-12 RX ORDER — ONDANSETRON 2 MG/ML
4 INJECTION INTRAMUSCULAR; INTRAVENOUS ONCE
Status: COMPLETED | OUTPATIENT
Start: 2021-05-12 | End: 2021-05-12

## 2021-05-12 RX ORDER — HYDROMORPHONE HYDROCHLORIDE 1 MG/ML
0.5 INJECTION, SOLUTION INTRAMUSCULAR; INTRAVENOUS; SUBCUTANEOUS ONCE
Status: COMPLETED | OUTPATIENT
Start: 2021-05-12 | End: 2021-05-12

## 2021-05-12 RX ORDER — HALOPERIDOL 5 MG/ML
2.5 INJECTION INTRAMUSCULAR ONCE
Status: COMPLETED | OUTPATIENT
Start: 2021-05-12 | End: 2021-05-12

## 2021-05-12 RX ORDER — SUCRALFATE ORAL 1 G/10ML
1 SUSPENSION ORAL 4 TIMES DAILY PRN
Qty: 420 ML | Refills: 0 | Status: SHIPPED | OUTPATIENT
Start: 2021-05-12 | End: 2021-09-24

## 2021-05-12 RX ORDER — IOPAMIDOL 755 MG/ML
500 INJECTION, SOLUTION INTRAVASCULAR ONCE
Status: COMPLETED | OUTPATIENT
Start: 2021-05-12 | End: 2021-05-12

## 2021-05-12 RX ORDER — ONDANSETRON 4 MG/1
4 TABLET, ORALLY DISINTEGRATING ORAL EVERY 6 HOURS PRN
Qty: 12 TABLET | Refills: 0 | Status: SHIPPED | OUTPATIENT
Start: 2021-05-12 | End: 2021-09-24

## 2021-05-12 RX ORDER — FAMOTIDINE 20 MG/1
20 TABLET, FILM COATED ORAL 2 TIMES DAILY
Qty: 10 TABLET | Refills: 0 | Status: SHIPPED | OUTPATIENT
Start: 2021-05-12 | End: 2021-05-17

## 2021-05-12 RX ORDER — DIPHENHYDRAMINE HYDROCHLORIDE 50 MG/ML
25 INJECTION INTRAMUSCULAR; INTRAVENOUS ONCE
Status: COMPLETED | OUTPATIENT
Start: 2021-05-12 | End: 2021-05-12

## 2021-05-12 RX ADMIN — HYDROMORPHONE HYDROCHLORIDE 0.5 MG: 1 INJECTION, SOLUTION INTRAMUSCULAR; INTRAVENOUS; SUBCUTANEOUS at 18:19

## 2021-05-12 RX ADMIN — SODIUM CHLORIDE 1000 ML: 9 INJECTION, SOLUTION INTRAVENOUS at 17:45

## 2021-05-12 RX ADMIN — IOPAMIDOL 100 ML: 755 INJECTION, SOLUTION INTRAVENOUS at 18:32

## 2021-05-12 RX ADMIN — HYDROMORPHONE HYDROCHLORIDE 0.5 MG: 1 INJECTION, SOLUTION INTRAMUSCULAR; INTRAVENOUS; SUBCUTANEOUS at 19:37

## 2021-05-12 RX ADMIN — DIPHENHYDRAMINE HYDROCHLORIDE 25 MG: 50 INJECTION, SOLUTION INTRAMUSCULAR; INTRAVENOUS at 20:36

## 2021-05-12 RX ADMIN — SODIUM CHLORIDE 65 ML: 9 INJECTION, SOLUTION INTRAVENOUS at 18:32

## 2021-05-12 RX ADMIN — LIDOCAINE HYDROCHLORIDE 30 ML: 20 SOLUTION ORAL; TOPICAL at 19:59

## 2021-05-12 RX ADMIN — HYDROMORPHONE HYDROCHLORIDE 0.5 MG: 1 INJECTION, SOLUTION INTRAMUSCULAR; INTRAVENOUS; SUBCUTANEOUS at 17:39

## 2021-05-12 RX ADMIN — PANTOPRAZOLE SODIUM 40 MG: 40 INJECTION, POWDER, FOR SOLUTION INTRAVENOUS at 19:59

## 2021-05-12 RX ADMIN — HALOPERIDOL LACTATE 2.5 MG: 5 INJECTION, SOLUTION INTRAMUSCULAR at 20:36

## 2021-05-12 RX ADMIN — FAMOTIDINE 20 MG: 10 INJECTION, SOLUTION INTRAVENOUS at 17:55

## 2021-05-12 RX ADMIN — ONDANSETRON 4 MG: 2 INJECTION INTRAMUSCULAR; INTRAVENOUS at 17:39

## 2021-05-12 ASSESSMENT — ENCOUNTER SYMPTOMS
FEVER: 0
HEMATURIA: 0
DIARRHEA: 0
NAUSEA: 1
FREQUENCY: 0
VOMITING: 1
DYSURIA: 0
COUGH: 1
ABDOMINAL PAIN: 1
BLOOD IN STOOL: 0

## 2021-05-12 NOTE — ED TRIAGE NOTES
2 wks of nausea with intermittent abd pain pain became intense in epigastric area. No meds PTA. Pt recent -covid negative test in past week. Pt is cry heaving with continuous nausea. Pt reports no change in bowel or bladder.

## 2021-05-12 NOTE — ED PROVIDER NOTES
History   Chief Complaint:  Abdominal Pain       HPI   Swetha Patterson is a 51 year old female with history of hypertension, hyperlipidemia, COPD, GERD, and diabetes who presents for evaluation of abdominal pain. Approximately four weeks ago the patient started to develop intermittent nausea, and about two weeks ago she started to develop intermittent primarily epigastric pain. Today her pain worsened and she had one episode of vomiting this morning, and due to her ongoing pain she came into the ED for evaluation. She has had an appendectomy and cholecystectomy but denies any other history of abdominal surgeries. She denies any recent fever, diarrhea, bloody stools, dysuria, hematuria, or urinary frequency. She denies significant alcohol or ibuprofen use. She has had a slight cough recently but has received both doses of the COVID-19 vaccine. No chest pain, SOB, or hemoptysis.    Review of Systems   Constitutional: Negative for fever.   Respiratory: Positive for cough.    Gastrointestinal: Positive for abdominal pain, nausea and vomiting. Negative for blood in stool and diarrhea.   Genitourinary: Negative for dysuria, frequency and hematuria.   All other systems reviewed and are negative.      Allergies:  Codeine  Cyclobenzaprine  Hydrocodone  Sulfa Drugs  Gabapentin     Medications:  Tylenol   Albuterol nebulizer   Lipitor   Wellbutrin XL   Cetirizine   Klonopin   Flexeril   Cymbalta  Trelegy ellipta inhaler   Lasix   Glipizide  Hydroxyzine   Lamictal  Lisinopril   Metformin   Robaxin   Singulair   Nitroglycerin   Zofran  Omeprazole  Miralax  Prednisone   Compazine   Valtrex   Ventolin inhaler      Past Medical History:    Anxiety  Asthma  Chronic pain  COPD   Diabetes mellitus, type II   Depression   Emphysema with chronic bronchitis   Esophageal reflux   Fibromyalgia  GERD   Hypertension  Hyperlipidemia   Liver disease   Peripheral artery disease   DIAZ      Past Surgical History:    Arthroscopy shoulder  decompression left   Biopsy artery temporal left   Bronchial thermoplasty  Appendectomy  Colonoscopy   Discectomy, fusion cervical anterior one level, combined C5-C6   Polyp removed from vocal cords  EGD  Excise node mediastinal  Laparoscopic cholecystectomy  Thoracoscopy   Tonsillectomy     Family History:    Heart disease - Mother   Hypertension - Mother and sister   Cerebrovascular disease - Mother and sister   Depression - Mother   Gallbladder disease - Mother   Asthma - Mother and sister     Social History:  The patient presents to the ED alone.   Alcohol use: Denies     Physical Exam     Patient Vitals for the past 24 hrs:   BP Temp Temp src Pulse Resp SpO2 Weight   05/12/21 2100 (!) 141/77 -- -- 65 -- 94 % --   05/12/21 2045 137/70 -- -- 70 -- 95 % --   05/12/21 2030 128/75 -- -- 78 -- 95 % --   05/12/21 2015 131/71 -- -- 81 -- 95 % --   05/12/21 1945 137/68 -- -- 81 16 93 % --   05/12/21 1915 135/75 -- -- 86 -- 95 % --   05/12/21 1900 134/70 -- -- 84 -- 95 % --   05/12/21 1855 -- -- -- -- -- 93 % --   05/12/21 1850 (!) 147/69 -- -- 83 -- 96 % --   05/12/21 1815 -- -- -- 85 28 94 % --   05/12/21 1810 -- -- -- 89 27 95 % --   05/12/21 1805 -- -- -- 78 18 95 % --   05/12/21 1800 (!) 154/93 -- -- 79 -- 96 % --   05/12/21 1755 -- -- -- 82 27 96 % --   05/12/21 1750 -- -- -- 96 29 96 % --   05/12/21 1745 129/75 -- -- 80 24 96 % --   05/12/21 1740 -- -- -- 81 21 95 % --   05/12/21 1735 (!) 166/92 -- -- 77 -- 98 % --   05/12/21 1718 (!) 156/110 -- -- -- -- -- --   05/12/21 1716 -- 98  F (36.7  C) Temporal 92 18 100 % 108.9 kg (240 lb)       Physical Exam  General: Awake, alert, anxious appearing and tearful  Head:  Scalp is NC/AT  Eyes:  Conjunctiva normal, PERRL  ENT:  The external nose and ears are normal.   Neck:  Normal range of motion without rigidity.  CV:  Regular rate and rhythm    No pathologic murmur, rubs, or gallops.  Resp:  Breath sounds are clear bilaterally.  No crackles, wheezes, rhonchi,  stridor.    Non-labored, no retractions or accessory muscle use  Abdomen: Abdomen is soft, no distension, epigastric tenderness, no masses. No CVA tenderness.  MS:  No lower extremity edema or asymmetric calf swelling.     No midline cervical, thoracic, or lumbar tenderness  Skin:  Warm and dry, No rash or lesions noted. 2+ peripheral pulses in all extremities  Neuro: Alert and oriented x3.  No gross motor deficits.  No facial asymmetry.  Psych: Awake. Alert. Normal affect. Appropriate interactions.    Emergency Department Course   ECG  ECG taken at 17:32:55, ECG read at 1755  Sinus rhythm with short NC, Otherwise normal ECG    QTc improved as compared to EKG dated 11/20/2020   Rate 78 bpm. NC interval 110 ms. QRS duration 82 ms. QT/QTc 400/456 ms. P-R-T axes 40 68 56.      Imaging:  Abd/Pelvis CT, IV Contrast Only Trauma / AAA:  IMPRESSION:   1.  No etiology for symptoms. Nothing obstructive or inflammatory involving bowel.   2.  Fatty infiltration of liver.   3.  Small kidney stones bilaterally, no ureteral stone or hydronephrosis.  Per radiology.      Laboratory:   CBC: WBC 12.0 high, HGB 13.8,    CMP: ALT 60 high, o/w WNL (Creatinine 0.98)   Troponin (Collected 1733): <0.015   Lipase: 169   HCG Qualitative Serum: Negative      Emergency Department Course:  Reviewed:  I reviewed nursing notes, vitals and past medical history    Assessments:  1719: I obtained history and examined the patient as noted above.     1955: I updated and reassessed the patient. She was not feeling improved.     2031: I updated and reassessed the patient.      2105: I updated and reassessed the patient. She was feeling improved and tolerated an oral challenge.      Interventions:  1739 Zofran 4 mg IV   1739 Dilaudid 0.5 mg IV   1745 NS 1,000 mL IV   1755 Pepcid 20 mg IV   1819 Dilaudid 0.5 mg IV   1937 Dilaudid 0.5 mg IV   1959 Protonix 40 mg IV   1959 GI cocktail 30 mL PO   2036 Haldol 2.5 mg IV   2036 Benadryl 25 mg IV      Disposition:  The patient was discharged to home.     Impression & Plan   Medical Decision Makin-year-old female who presents with epigastric abdominal pain.  Broad differential considered.  Work-up here is been reassuring.  CT scan of the abdomen and pelvis without acute abnormality.  Lipase and LFTs unremarkable.  EKG without acute ischemic changes or arrhythmia, troponin undetectable, doubt referred pain from atypical ACS.  She has no urinary symptoms.  She is not pregnant.  Blood work notable for mildly elevated white blood cell count however no fever or other infectious symptoms.  CT shows incidental findings of fatty liver which was previously noted, as well as intrarenal stones but no ureteral stones or source for her pain.  Strongly suspect her pain is due to reflux versus gastritis versus PUD.  No evidence of GI bleed or perforation.  Felt improved following symptomatic treatment and tolerating p.o.  Will discharge on acid suppression medications, close follow-up with PCP.  She will return immediately if new or worsening symptoms, fever, bloody stools, increasing pain, chest pain, shortness of breath etc.     Diagnosis:    ICD-10-CM    1. Abdominal pain, epigastric  R10.13    2. Kidney stones  N20.0        Discharge Medications:  Discharge Medication List as of 2021  9:17 PM      START taking these medications    Details   famotidine (PEPCID) 20 MG tablet Take 1 tablet (20 mg) by mouth 2 times daily for 5 days, Disp-10 tablet, R-0, E-Prescribe      ondansetron (ZOFRAN ODT) 4 MG ODT tab Take 1 tablet (4 mg) by mouth every 6 hours as needed for nausea or vomiting, Disp-12 tablet, R-0, E-Prescribe      sucralfate (CARAFATE) 1 GM/10ML suspension Take 10 mLs (1 g) by mouth 4 times daily as needed for nausea (Pain), Disp-420 mL, R-0, E-Prescribe             Scribe Disclosure:  PRICE, Billy Le, am serving as a scribe at 5:19 PM on 2021 to document services personally performed by Keo Fox  PHILLIP, based on my observations and the provider's statements to me.      Keo Fox, PHILLIP  05/12/21 [id number]

## 2021-05-13 LAB — INTERPRETATION ECG - MUSE: NORMAL

## 2021-05-14 ENCOUNTER — TELEPHONE (OUTPATIENT)
Dept: INTERNAL MEDICINE | Facility: CLINIC | Age: 51
End: 2021-05-14

## 2021-05-14 DIAGNOSIS — R73.03 PREDIABETES: ICD-10-CM

## 2021-05-14 DIAGNOSIS — J44.1 COPD EXACERBATION (H): Primary | ICD-10-CM

## 2021-05-14 DIAGNOSIS — R11.0 NAUSEA: ICD-10-CM

## 2021-05-14 DIAGNOSIS — L60.8 CHANGE IN NAIL APPEARANCE: Primary | ICD-10-CM

## 2021-05-14 DIAGNOSIS — F41.9 ANXIETY: ICD-10-CM

## 2021-05-14 RX ORDER — HYDROXYZINE HYDROCHLORIDE 50 MG/1
TABLET, FILM COATED ORAL
Qty: 70 TABLET | Refills: 5 | Status: SHIPPED | OUTPATIENT
Start: 2021-05-14 | End: 2021-08-12

## 2021-05-14 RX ORDER — ONDANSETRON 8 MG/1
TABLET, FILM COATED ORAL
Qty: 30 TABLET | Refills: 1 | Status: SHIPPED | OUTPATIENT
Start: 2021-05-14 | End: 2021-07-09

## 2021-05-14 NOTE — TELEPHONE ENCOUNTER
Routing refill request to provider for review/approval because:  Labs out of range:  ACT  Labs not current:  a1c  Solomon Desir RN, BSN

## 2021-05-14 NOTE — TELEPHONE ENCOUNTER
Good afternoon.   Patient was seen by HC today with C/O toe nail fungus to left great toe, she is looking for either a topical treatment or other treatment you would recommend VS going to derm. Patient with noted yellowing of toe nail with white striations of the nail its self. Patient does not report any other issues or concerns with toe other than its appearance.  Please advise for next steps and thank you.

## 2021-05-17 DIAGNOSIS — M79.7 FIBROMYALGIA: ICD-10-CM

## 2021-05-17 RX ORDER — ALBUTEROL SULFATE 90 UG/1
AEROSOL, METERED RESPIRATORY (INHALATION)
Qty: 18 G | Refills: 1 | Status: SHIPPED | OUTPATIENT
Start: 2021-05-17 | End: 2022-02-16

## 2021-05-17 NOTE — TELEPHONE ENCOUNTER
Received fax from pharmacy requesting refill(s) for DULoxetine (CYMBALTA) 20 MG capsule     Last refilled on 04/24/21    Pt last seen on 04/01/21  Next appt scheduled for None    E-prescribe to:     [facility] [address] [place]  [place] - [address]     Will facilitate refill.      Mamie Pineda MA  [facility] [place]

## 2021-05-18 ENCOUNTER — TELEPHONE (OUTPATIENT)
Dept: INTERNAL MEDICINE | Facility: CLINIC | Age: 51
End: 2021-05-18

## 2021-05-18 RX ORDER — DULOXETIN HYDROCHLORIDE 20 MG/1
20 CAPSULE, DELAYED RELEASE ORAL DAILY
Qty: 53 CAPSULE | Refills: 1 | Status: SHIPPED | OUTPATIENT
Start: 2021-05-18 | End: 2022-02-16

## 2021-05-18 NOTE — TELEPHONE ENCOUNTER
Patient calling. She still has abdominal pain and is sweating a lot. Patient stated swelling in ankle area and now her legs hurt. Patient was seen in ED. She is not wanting to go back to the ED as she stated they dont do anything. Please advise. Ok to call and nettie [phone]

## 2021-05-18 NOTE — TELEPHONE ENCOUNTER
Signed Prescriptions:                        Disp   Refills    DULoxetine (CYMBALTA) 20 MG capsule        53 cap*1        Sig: Take 1 capsule (20 mg) by mouth daily X 7 days and if           well tolerated increase to 40 mg daily.  Authorizing Provider: RASHIDA DEL TORO MD  [facility]

## 2021-05-18 NOTE — TELEPHONE ENCOUNTER
Patient states she feels about the same as when she was seen in ER 5/12 despite using Famotidine, Zofran and Carafate.  Complains of nausea, then sweats then she develops epigastric pain which sometimes radiates to left upper abdomen.  Rates abdominal pain 5 out of 10.  She has intermittent swelling of ankles and pain in joints of legs.  Took 600 mg of Ibuprofen around midday today which did not help any of her pain.  States she Is concerned she may have Whipple's Disease which she found through googling her symptoms.  Scheduled to see primary care provider 5/20 and if worsening symptoms before then will go to ER.  SERJIO Chandra R.N.

## 2021-05-20 ENCOUNTER — OFFICE VISIT (OUTPATIENT)
Dept: INTERNAL MEDICINE | Facility: CLINIC | Age: 51
End: 2021-05-20
Payer: COMMERCIAL

## 2021-05-20 VITALS
WEIGHT: 240.2 LBS | TEMPERATURE: 98.2 F | HEIGHT: 63 IN | SYSTOLIC BLOOD PRESSURE: 132 MMHG | HEART RATE: 86 BPM | RESPIRATION RATE: 16 BRPM | DIASTOLIC BLOOD PRESSURE: 78 MMHG | BODY MASS INDEX: 42.56 KG/M2 | OXYGEN SATURATION: 96 %

## 2021-05-20 DIAGNOSIS — J30.2 SEASONAL ALLERGIC RHINITIS, UNSPECIFIED TRIGGER: ICD-10-CM

## 2021-05-20 DIAGNOSIS — R60.0 BILATERAL LEG EDEMA: ICD-10-CM

## 2021-05-20 DIAGNOSIS — R10.13 EPIGASTRIC PAIN: Primary | ICD-10-CM

## 2021-05-20 DIAGNOSIS — J44.9 COPD, MODERATE (H): ICD-10-CM

## 2021-05-20 DIAGNOSIS — R61 EXCESSIVE SWEATING: ICD-10-CM

## 2021-05-20 LAB
LDH SERPL L TO P-CCNC: 168 U/L (ref 81–234)
TSH SERPL DL<=0.005 MIU/L-ACNC: 1.48 MU/L (ref 0.4–4)

## 2021-05-20 PROCEDURE — 99000 SPECIMEN HANDLING OFFICE-LAB: CPT | Performed by: INTERNAL MEDICINE

## 2021-05-20 PROCEDURE — 36415 COLL VENOUS BLD VENIPUNCTURE: CPT | Performed by: INTERNAL MEDICINE

## 2021-05-20 PROCEDURE — 82384 ASSAY THREE CATECHOLAMINES: CPT | Mod: 90 | Performed by: INTERNAL MEDICINE

## 2021-05-20 PROCEDURE — 83497 ASSAY OF 5-HIAA: CPT | Mod: 90 | Performed by: INTERNAL MEDICINE

## 2021-05-20 PROCEDURE — 86481 TB AG RESPONSE T-CELL SUSP: CPT | Performed by: INTERNAL MEDICINE

## 2021-05-20 PROCEDURE — 99215 OFFICE O/P EST HI 40 MIN: CPT | Performed by: INTERNAL MEDICINE

## 2021-05-20 PROCEDURE — 84443 ASSAY THYROID STIM HORMONE: CPT | Performed by: INTERNAL MEDICINE

## 2021-05-20 PROCEDURE — 83615 LACTATE (LD) (LDH) ENZYME: CPT | Performed by: INTERNAL MEDICINE

## 2021-05-20 RX ORDER — FLUTICASONE PROPIONATE 50 MCG
2 SPRAY, SUSPENSION (ML) NASAL DAILY
Qty: 16 G | Refills: 5 | Status: ON HOLD | OUTPATIENT
Start: 2021-05-20 | End: 2023-06-26

## 2021-05-20 RX ORDER — SUCRALFATE 1 G/1
1 TABLET ORAL 4 TIMES DAILY
Qty: 120 TABLET | Refills: 1 | Status: SHIPPED | OUTPATIENT
Start: 2021-05-20 | End: 2021-09-24

## 2021-05-20 ASSESSMENT — MIFFLIN-ST. JEOR: SCORE: 1668.67

## 2021-05-20 NOTE — PROGRESS NOTES
Dr Low's note      Patient's instructions / PLAN:                                                        Plan:  1. Excessive sweating can be associated with Duloxetine / Cymbalta   2. Carafate/ sucralfate 1 gram 4 times a day   3. Referral to gastro dept at [facility]   4.  Labs today - [address]   5. No changes in the other meds       ASSESSMENT & PLAN:                                                      Med Complex Patient : COPD w multiple exacerb ( not compliant w pulmonology appointment ), chr epig pain, chr pain syndrome ( hx of  Opioid Rx ), depression and anxiety, HTN, obesity, prediabetes, HLipidemia, polypharmacy.        (R10.13) Epigastric pain  (primary encounter diagnosis)  Comment: acute on chronic. Neg w/u at [facility]. C/o of severe pain   Plan: sucralfate (CARAFATE) 1 GM tablet,         GASTROENTEROLOGY ADULT REF CONSULT ONLY            (J30.2) Seasonal allergic rhinitis, unspecified trigger  Comment:   Plan: fluticasone (FLONASE) 50 MCG/ACT nasal spray            (R61) Excessive sweating  Comment: I suspect it is sec one of her meds   We will do labs to rule out carcinoid, pheo, lymphoma, TB  We can consider adding clonidine   Plan: 5-HIAA quantitative urine, Lactate         Dehydrogenase, TSH with free T4 reflex,         Catecholamines fractioned free urine,         Quantiferon TB Gold Plus      (J44.9) COPD, moderate (H)  Comment: She hasn't seen pulmonary dept since July 2020, she had multiple exacerbations treated with multiple prednisone tx  Plan: Continue same meds, same doses for now      (R60.0) Bilateral leg edema  Comment:   Plan: low salt diet, keep legs elevated     Chief complaint:                                                      abd pain  Excessive sweats  Follow up chronic medical problems      SUBJECTIVE:                                                    History of present illness:    Abd pain  -- constant   -- she hasn't been to GI clinic and she feels disappointed that her pain is  "not explained   -- Abd CT: May 2021: IMPRESSION:   1.  No etiology for symptoms. Nothing obstructive or inflammatory involving bowel.  2.  Fatty infiltration of liver.  3.  Small kidney stones bilaterally, no ureteral stone or hydronephrosis.    Excessive sweating   -- can be associated with Duloxetine / Cymbalta but she states she doesn't take it   -- we can try Clonidine in the the future     COPD  -- she doesn't have any future appointment with the lung specialist     Leg edema   -- mild, possible sec recent high dose prednisone tx  -- obs     ED/UC Followup:    Facility:  [facility]  Date of visit: 5/12/21  Reason for visit: Abdominal pain  Current Status: same, today pain level 6/10 and excessive sweating       Review of Systems:                                                      ROS: negative for fever, chills, cough, wheezes, chest pain, shortness of breath, vomiting, abdominal pain, leg swelling Pos for abd pain          OBJECTIVE:             Physical exam:  Blood pressure 132/78, pulse 86, temperature 98.2  F (36.8  C), temperature source Oral, resp. rate 16, height 1.6 m (5' 3\"), weight 109 kg (240 lb 3.2 oz), last menstrual period 04/15/2016, SpO2 96 %, not currently breastfeeding.     NAD, appears comfortable  Skin: no rashes   Neck: supple, no JVD,  No thyroidmegaly. Lymph nodes nonpalpable cervical and supraclavicular.  Chest: clear to auscultation bilaterally, good respiratory effort  Heart: S1 S2, RRR, no mgr appreciated  Abdomen: soft, epig tender,   Extremities: trace ankle edema,  Neurologic: A, Ox3, no focal signs appreciated    PMHx: reviewed  Past Medical History:   Diagnosis Date     Anxiety state, unspecified      Asthma      Chronic pain     back pain from cyst     Contact dermatitis and other eczema, due to unspecified cause      COPD (chronic obstructive pulmonary disease) (H)      Depressive disorder, not elsewhere classified      Diabetes (H)      Emphysema with chronic " "bronchitis (H)      Esophageal reflux      Family history of ischemic heart disease      Fibromyalgia      Gastro-oesophageal reflux disease      History of emphysema      Hoarseness      HTN, goal below 140/90      Hyperlipidemia LDL goal <130      Liver disease     \"fatty liver\"     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)      PONV (postoperative nausea and vomiting)      Rectal bleeding       PSHx: reviewed  Past Surgical History:   Procedure Laterality Date     ARTHROSCOPY SHOULDER DECOMPRESSION Left 10/21/2015    Procedure: ARTHROSCOPY SHOULDER DECOMPRESSION;  Surgeon: Julien Milian MD;  Location: [facility] OR     BIOPSY ARTERY TEMPORAL Left 3/11/2020    Procedure: LEFT TEMPORAL ARTERY BIOPSY;  Surgeon: Ibeth Conner MD;  Location: [facility] OR     BRONCHIAL THERMOPLASTY N/A 11/14/2014    Procedure: BRONCHIAL THERMOPLASTY;  Surgeon: Ward Whitaker MD;  Location: [facility] GI     BRONCHIAL THERMOPLASTY N/A 12/19/2014    Procedure: BRONCHIAL THERMOPLASTY;  Surgeon: Ward Whitaker MD;  [facility]: [facility] OR     BRONCHIAL THERMOPLASTY N/A 2/6/2015    Procedure: BRONCHIAL THERMOPLASTY;  Surgeon: Ward Whitaker MD;  Location: [facility] OR     C APPENDECTOMY  at age 17     COLONOSCOPY N/A 11/26/2018    Procedure: COLONOSCOPY ([facility]);  Surgeon: Marshall Oakes MD;  Location: [facility] OR     COLONOSCOPY N/A 10/22/2020    Procedure: Colonoscopy;  Surgeon: Marshall Mccormick MD;  Location: [facility] OR     DISCECTOMY, FUSION CERVICAL ANTERIOR ONE LEVEL, COMBINED N/A 5/1/2018    Procedure: COMBINED DISCECTOMY, FUSION CERVICAL ANTERIOR ONE LEVEL;  1.  C5-C6 anterior cervical diskectomy and fusion.    2.  C5-C6 application of intervertebral biomechanical device for interbody fusion purposes.    3.  C5-C6 anterior instrumentation using the standard Kristin InViZia 24 mm plate with four associated bone screws, 12 mm in length.  Inferior screws are fixed.  Superior screws are va     ENT SURGERY  2015    polyps removed from vocal " cords      ESOPHAGOSCOPY, GASTROSCOPY, DUODENOSCOPY (EGD), COMBINED N/A 10/22/2020    Procedure: Esophagoscopy, gastroscopy, duodenoscopy with biopsies;  Surgeon: Marshall Mccormick MD;  Location: [facility] OR     EXCISE NODE MEDIASTINAL  4/26/2013    Procedure: EXCISE NODE MEDIASTINAL;;  Surgeon: Av Peña MD;  Location:  OR     LAPAROSCOPIC CHOLECYSTECTOMY N/A 10/19/2017    Procedure: LAPAROSCOPIC CHOLECYSTECTOMY;  LAPAROSCOPIC CHOLECYSTECTOMY;  Surgeon: Ney Jerry MD;  Location: [facility] OR     THORACOSCOPY  4/26/2013    Procedure: THORACOSCOPY;  LEFT VIDEO ASSISTED THORACOSCOPY, RESECTION OF POSTERIOR MEDIASTINAL MASS;  Surgeon: Av Peña MD;  Location:  OR     TONSILLECTOMY  as a kid     TONSILLECTOMY          Meds: reviewed  Current Outpatient Medications   Medication Sig Dispense Refill     albuterol (PROVENTIL) (2.5 MG/3ML) 0.083% neb solution Take 1 vial (2.5 mg) by nebulization every 6 hours as needed for shortness of breath / dyspnea or wheezing 25 vial 3     atorvastatin (LIPITOR) 80 MG tablet TAKE ONE TABLET BY MOUTH EVERY DAY 90 tablet 0     benzoyl peroxide (ACNE-CLEAR) 10 % external gel Apply topically 2 times daily as needed       blood glucose (ACCU-CHEK OLINDA PLUS) test strip USE TO TEST BLOOD SUGAR ONE TIME DAILY OR AS DIRECTED 100 each 0     blood glucose (NO BRAND SPECIFIED) lancets standard Use to test blood sugar 1 times daily or as directed. 100 each 3     blood glucose (NO BRAND SPECIFIED) lancets standard Use to test blood sugar 1 time daily 100 each 1     blood glucose (NO BRAND SPECIFIED) test strip Use to test blood sugar 1 times daily or as directed.  Dispense Accu-chek Olinda Plus or per patient insurance 100 strip 3     blood glucose monitoring (NO BRAND SPECIFIED) meter device kit Use to test blood sugar 1 times daily or as directed.  Dispense Accu-chek Olinda Plus or per insurance preference 1 kit 0     blood glucose monitoring (NO BRAND SPECIFIED) meter  device kit Use to test blood sugar 1 times daily or as directed. 1 kit 0     buPROPion (WELLBUTRIN XL) 150 MG 24 hr tablet TAKE ONE TABLET BY MOUTH EVERY MORNING 90 tablet 2     cetirizine HCl 10 MG CAPS Take 1 capsule (10 mg) by mouth daily as needed Takes in the spring. 90 capsule 3     clindamycin 1 % EX external gel Apply topically 2 times daily 60 g 3     clonazePAM (KLONOPIN) 1 MG tablet Take 1 tablet (1 mg) by mouth 2 times daily as needed for anxiety She needs to see her PCP to get more tablets 5 tablet 0     cyclobenzaprine (FLEXERIL) 5 MG tablet Take 1 tablet (5 mg) by mouth 3 times daily as needed for muscle spasms 30 tablet 1     desonide (DESOWEN) 0.05 % external cream Apply topically daily       DULoxetine (CYMBALTA) 20 MG capsule Take 1 capsule (20 mg) by mouth daily X 7 days and if well tolerated increase to 40 mg daily. 53 capsule 1     fluticasone 50 MCG/ACT NA nasal spray Spray 2 sprays in nostril daily 16 g 5     Fluticasone-Umeclidin-Vilanterol (TRELEGY ELLIPTA) 100-62.5-25 MCG/INH oral inhaler Inhale 1 puff into the lungs daily 28 each 3     furosemide (LASIX) 20 MG tablet TAKE ONE TABLET BY MOUTH EVERY DAY 90 tablet 0     glipiZIDE (GLUCOTROL XL) 2.5 MG 24 hr tablet TAKE ONE TABLET BY MOUTH EVERY DAY 90 tablet 1     hydrocortisone 2.5 % cream APPLY TOPICALLY TO THE AFFECTED AREA(S) TWO TIMES A DAY 30 g 1     hydrOXYzine (ATARAX) 50 MG tablet TAKE TWO TABLETS BY MOUTH THREE TIMES A DAY AS NEEDED FOR ANXIETY MAX 70 TABLETS PER WEEK 70 tablet 5     lamoTRIgine (LAMICTAL) 100 MG tablet 100 mg daily        levofloxacin (LEVAQUIN) 500 MG tablet Take 1 tablet (500 mg) by mouth daily 7 tablet 0     lisinopril (ZESTRIL) 20 MG tablet TAKE ONE TABLET BY MOUTH EVERY DAY 90 tablet 0     metFORMIN (GLUCOPHAGE) 500 MG tablet TAKE ONE TABLET BY MOUTH EVERY DAY WITH BREAKFAST 90 tablet 1     methocarbamol (ROBAXIN) 500 MG tablet Take 1 tablet (500 mg) by mouth 3 times daily as needed for muscle spasms 120  tablet 0     montelukast (SINGULAIR) 10 MG tablet Take 1 tablet (10 mg) by mouth At Bedtime 90 tablet 4     nicotine (NICORETTE) 2 MG gum Place 1 each (2 mg) inside cheek as needed for smoking cessation 150 each 1     nitroGLYcerin (NITROSTAT) 0.4 MG sublingual tablet PLACE ONE TABLET UNDER THE TONGUE AT THE 1ST SIGN OF ATTACK. IF PAIN IS UNRELIEVED OR WORSENED 5 MINUTES AFTER 1ST DOSE, PROMPT MEDICAL ASSISTANCE IS NEEDED. MAY REPEAT EVERY 5 MINUTES UNTIL PAIN IS RELIEVED. MAX OF THREE DOSES 25 tablet 3     nystatin (MYCOSTATIN) 056465 UNIT/GM external powder APPLY TOPICALLY TWO TIMES A DAY AS NEEDED FOR SKIN RASH 45 g 0     omeprazole 20 MG tablet TAKE ONE TABLET BY MOUTH TWICE A DAY 60 tablet 0     ondansetron (ZOFRAN ODT) 4 MG ODT tab Take 1 tablet (4 mg) by mouth every 6 hours as needed for nausea or vomiting 12 tablet 0     ondansetron (ZOFRAN) 8 MG tablet TAKE ONE-HALF TO ONE TABLET BY MOUTH EVERY 8 HOURS AS NEEDED FOR NAUSEA 30 tablet 1     order for DME Please provide oximeter 1 Device 0     order for DME Equipment being ordered: Digital home blood pressure monitor kit 1 Device 0     order for DME Equipment being ordered: Pulse Oxymeter 1 Device 0     polyethylene glycol (MIRALAX) 17 GM/Dose powder Take 17 g (1 capful) by mouth daily 507 g 1     predniSONE (DELTASONE) 10 MG tablet -- take 2 tablets twice a day for 3 days then -- take 2 tablets daily for 10 days, -- then take 1 tab daily for 10 days 42 tablet 0     prochlorperazine (COMPAZINE) 10 MG tablet Take 1 tablet (10 mg) by mouth every 8 hours as needed for nausea or vomiting 30 tablet 0     sucralfate (CARAFATE) 1 GM/10ML suspension Take 10 mLs (1 g) by mouth 4 times daily as needed for nausea (Pain) 420 mL 0     sulfamethoxazole-trimethoprim (BACTRIM DS) 800-160 MG tablet Take 1 tablet by mouth 2 times daily Take one tablet twice daily. For additional refills, please schedule a follow-up appointment. 180 tablet 1     terbinafine (LAMISIL) 1 %  external cream Apply topically 2 times daily 30 g 2     VENTOLIN  (90 Base) MCG/ACT inhaler INHALE TWO PUFFS BY MOUTH EVERY 6 HOURS AS NEEDED FOR SHORTNESS OF BREATH 18 g 1     acetaminophen (TYLENOL) 325 MG tablet Take 3 tablets (975 mg) by mouth every 6 hours as needed for pain (Patient not taking: Reported on 5/20/2021) 50 tablet 0     valACYclovir (VALTREX) 1000 mg tablet Take 2 tablets (2,000 mg) by mouth 2 times daily for 1 day 4 tablet 0       Soc Hx: reviewed  Fam Hx: reviewed    40 minutes spent on the date of the encounter doing   chart review,   review of test results,   interpretation of tests,   patient visit,   documentation,       Roro Low MD  Internal Medicine

## 2021-05-20 NOTE — PATIENT INSTRUCTIONS
Plan:  1. Excessive sweating can be associated with Duloxetine / Cymbalta   2. Carafate/ sucralfate 1 gram 4 times a day   3. Referral to gastro dept at [facility]   4.  Labs today - [address]   5. No changes in the other meds

## 2021-05-21 ENCOUNTER — TELEPHONE (OUTPATIENT)
Dept: PALLIATIVE MEDICINE | Facility: CLINIC | Age: 51
End: 2021-05-21

## 2021-05-21 NOTE — TELEPHONE ENCOUNTER
Called Sarah.  Informed her of the recommendations listed below by Dr Chambers. Advised there is not an easy fix and she will need to do a combination of things in order to get control over her pain which include Pain PT, Pain Psychology,  In clinic visit with Dr Chambers, over the counter CBD or Hemp products and acupuncture.  Pt did not want to set up an in clinic visit at this time. She has to many doctors appointments scheduled. She will call back at a later date to set something up.      Mariela Evans RN  Care Coordinator  [facility]

## 2021-05-21 NOTE — TELEPHONE ENCOUNTER
Called Sarah.  She went and saw her primary care provider.  PCP took her off of the Cymbalta immediatly.  Thinks pt is having side effects from it.  Excessive sweating, upset stomach, nausea, abdomin pain, feet swelling.  She has been on the Cymbalta for a month.  Pt did not taper off of it, just stopped it.  She does not think the methocarbamol helps at all.  And she can't afford it so she does not want to take it. Flexeril causes her legs to twitch so she cant be on that one either.  States her fibromyalgia is out of control.  Something needs to be done with this pain.  PCP did not give her an alternative medication to replace the Cymbalta.  She is having so may muscle spasms to her legs and how her arms.      Routing to provider to review and advise    Mariela Evans RN  Care Coordinator  [facility]

## 2021-05-21 NOTE — TELEPHONE ENCOUNTER
Reason for call:  Other   Patient called regarding (reason for call): call back  Additional comments: Pt would like a call back to discuss her medications.    Phone number to reach patient:  Cell number on file:    Telephone Information:   Mobile [phone]       Best Time:  anytime    Can we leave a detailed message on this number?  YES    Travel screening: Not Applicable     Brianda BUSTAMANTE    [facility]

## 2021-05-21 NOTE — TELEPHONE ENCOUNTER
There is not going to be a simple solution to help with pain related to fibromyalgia. It is going to take working on a combination of different things to make progress. She needs to re-schedule pain PT visits. She has a pain psychology visit scheduled in August. I can see if this can be moved up sooner to get started. To make further medication changes she needs to schedule a follow up visit with me. In the meantime she can try some over the counter CBD/Hemp to see if that provides some benefit. She can also start acupuncture as discussed at the initial visit if she has not done so already.     Fabiola Chambers MD  [facility]

## 2021-05-22 ENCOUNTER — HOSPITAL ENCOUNTER (EMERGENCY)
Facility: CLINIC | Age: 51
Discharge: HOME | End: 2021-05-22
Attending: EMERGENCY MEDICINE | Admitting: EMERGENCY MEDICINE
Payer: COMMERCIAL

## 2021-05-22 VITALS
HEART RATE: 88 BPM | RESPIRATION RATE: 20 BRPM | SYSTOLIC BLOOD PRESSURE: 140 MMHG | TEMPERATURE: 97.9 F | OXYGEN SATURATION: 98 % | DIASTOLIC BLOOD PRESSURE: 90 MMHG

## 2021-05-22 DIAGNOSIS — R10.13 EPIGASTRIC PAIN: ICD-10-CM

## 2021-05-22 DIAGNOSIS — F10.920 ALCOHOLIC INTOXICATION WITHOUT COMPLICATION (H): ICD-10-CM

## 2021-05-22 LAB
5HIAA & CREATININE UR-IMP: NORMAL
5OH-INDOLEACETATE 24H UR-MCNC: 2.9 MG/L
5OH-INDOLEACETATE 24H UR-MRATE: NORMAL MG/D (ref 0–15)
5OH-INDOLEACETATE/CREAT 24H UR: 4 MG/GCR (ref 0–14)
ALBUMIN SERPL-MCNC: 3.7 G/DL (ref 3.4–5)
ALP SERPL-CCNC: 154 U/L (ref 40–150)
ALT SERPL W P-5'-P-CCNC: 80 U/L (ref 0–50)
ANION GAP SERPL CALCULATED.3IONS-SCNC: 8 MMOL/L (ref 3–14)
AST SERPL W P-5'-P-CCNC: 21 U/L (ref 0–45)
BASOPHILS # BLD AUTO: 0 10E9/L (ref 0–0.2)
BASOPHILS NFR BLD AUTO: 0.3 %
BILIRUB SERPL-MCNC: 0.2 MG/DL (ref 0.2–1.3)
BUN SERPL-MCNC: 14 MG/DL (ref 7–30)
CALCIUM SERPL-MCNC: 8.9 MG/DL (ref 8.5–10.1)
CHLORIDE SERPL-SCNC: 108 MMOL/L (ref 94–109)
CO2 SERPL-SCNC: 26 MMOL/L (ref 20–32)
COLLECT DURATION TIME UR: NORMAL HR
CREAT 24H UR-MRATE: NORMAL MG/D (ref 500–1400)
CREAT SERPL-MCNC: 1.01 MG/DL (ref 0.52–1.04)
CREAT SERPL-MCNC: 82 MG/DL
DIFFERENTIAL METHOD BLD: ABNORMAL
EOSINOPHIL # BLD AUTO: 0 10E9/L (ref 0–0.7)
EOSINOPHIL NFR BLD AUTO: 0 %
ERYTHROCYTE [DISTWIDTH] IN BLOOD BY AUTOMATED COUNT: 16.8 % (ref 10–15)
ETHANOL SERPL-MCNC: 0.17 G/DL
GAMMA INTERFERON BACKGROUND BLD IA-ACNC: 0 IU/ML
GFR SERPL CREATININE-BSD FRML MDRD: 64 ML/MIN/{1.73_M2}
GLUCOSE SERPL-MCNC: 75 MG/DL (ref 70–99)
HCT VFR BLD AUTO: 38.9 % (ref 35–47)
HGB BLD-MCNC: 12.4 G/DL (ref 11.7–15.7)
IMM GRANULOCYTES # BLD: 0.1 10E9/L (ref 0–0.4)
IMM GRANULOCYTES NFR BLD: 0.9 %
LIPASE SERPL-CCNC: 101 U/L (ref 73–393)
LYMPHOCYTES # BLD AUTO: 4.8 10E9/L (ref 0.8–5.3)
LYMPHOCYTES NFR BLD AUTO: 47.5 %
M TB IFN-G CD4+ BCKGRND COR BLD-ACNC: 10 IU/ML
M TB TUBERC IFN-G BLD QL: NEGATIVE
MCH RBC QN AUTO: 26.4 PG (ref 26.5–33)
MCHC RBC AUTO-ENTMCNC: 31.9 G/DL (ref 31.5–36.5)
MCV RBC AUTO: 83 FL (ref 78–100)
MITOGEN IGNF BCKGRD COR BLD-ACNC: 0.03 IU/ML
MITOGEN IGNF BCKGRD COR BLD-ACNC: 0.04 IU/ML
MONOCYTES # BLD AUTO: 0.5 10E9/L (ref 0–1.3)
MONOCYTES NFR BLD AUTO: 4.9 %
NEUTROPHILS # BLD AUTO: 4.7 10E9/L (ref 1.6–8.3)
NEUTROPHILS NFR BLD AUTO: 46.4 %
NRBC # BLD AUTO: 0 10*3/UL
NRBC BLD AUTO-RTO: 0 /100
PLATELET # BLD AUTO: 349 10E9/L (ref 150–450)
POTASSIUM SERPL-SCNC: 3.6 MMOL/L (ref 3.4–5.3)
PROT SERPL-MCNC: 7.4 G/DL (ref 6.8–8.8)
RBC # BLD AUTO: 4.7 10E12/L (ref 3.8–5.2)
SODIUM SERPL-SCNC: 142 MMOL/L (ref 133–144)
SPECIMEN VOL ?TM UR: 30 ML
WBC # BLD AUTO: 10 10E9/L (ref 4–11)

## 2021-05-22 PROCEDURE — 250N000011 HC RX IP 250 OP 636: Performed by: EMERGENCY MEDICINE

## 2021-05-22 PROCEDURE — 82077 ASSAY SPEC XCP UR&BREATH IA: CPT | Performed by: EMERGENCY MEDICINE

## 2021-05-22 PROCEDURE — 250N000009 HC RX 250: Performed by: EMERGENCY MEDICINE

## 2021-05-22 PROCEDURE — 99284 EMERGENCY DEPT VISIT MOD MDM: CPT | Mod: 25

## 2021-05-22 PROCEDURE — 96374 THER/PROPH/DIAG INJ IV PUSH: CPT

## 2021-05-22 PROCEDURE — 96361 HYDRATE IV INFUSION ADD-ON: CPT

## 2021-05-22 PROCEDURE — 80053 COMPREHEN METABOLIC PANEL: CPT | Performed by: EMERGENCY MEDICINE

## 2021-05-22 PROCEDURE — 258N000003 HC RX IP 258 OP 636: Performed by: EMERGENCY MEDICINE

## 2021-05-22 PROCEDURE — 83690 ASSAY OF LIPASE: CPT | Performed by: EMERGENCY MEDICINE

## 2021-05-22 PROCEDURE — 96375 TX/PRO/DX INJ NEW DRUG ADDON: CPT

## 2021-05-22 PROCEDURE — 85025 COMPLETE CBC W/AUTO DIFF WBC: CPT | Performed by: EMERGENCY MEDICINE

## 2021-05-22 RX ORDER — ONDANSETRON 2 MG/ML
4 INJECTION INTRAMUSCULAR; INTRAVENOUS ONCE
Status: COMPLETED | OUTPATIENT
Start: 2021-05-22 | End: 2021-05-22

## 2021-05-22 RX ORDER — KETOROLAC TROMETHAMINE 15 MG/ML
15 INJECTION, SOLUTION INTRAMUSCULAR; INTRAVENOUS ONCE
Status: COMPLETED | OUTPATIENT
Start: 2021-05-22 | End: 2021-05-22

## 2021-05-22 RX ADMIN — SODIUM CHLORIDE 1000 ML: 9 INJECTION, SOLUTION INTRAVENOUS at 01:49

## 2021-05-22 RX ADMIN — ONDANSETRON 4 MG: 2 INJECTION INTRAMUSCULAR; INTRAVENOUS at 01:50

## 2021-05-22 RX ADMIN — FAMOTIDINE 20 MG: 10 INJECTION, SOLUTION INTRAVENOUS at 01:50

## 2021-05-22 RX ADMIN — KETOROLAC TROMETHAMINE 15 MG: 15 INJECTION, SOLUTION INTRAMUSCULAR; INTRAVENOUS at 01:49

## 2021-05-22 ASSESSMENT — ENCOUNTER SYMPTOMS
FEVER: 0
ABDOMINAL PAIN: 1
DIAPHORESIS: 1
NAUSEA: 1
SHORTNESS OF BREATH: 0
VOMITING: 0

## 2021-05-22 NOTE — ED PROVIDER NOTES
"This 51 year old female patient was endorsed to me by Dr. Su pending sobriety for discharge.  She presented with alcohol intoxication and epigastric pain.  She has been seen recently for this epigastric pain including a CT scan on 5/12/2021 in this ER and primary care visit 2 days ago.  She has been treated with Toradol, Zofran, Pepcid, and IV fluids and is medically cleared. On DANA.     I have reviewed patient's vitals, labs, and notes which are remarkable for blood alcohol level of 0.17.    0825: I reevaluated Sarah who is clinically sober.  She is without slurred speech and blood alcohol level of 0.17 was 6-1/2 hours ago.  She is primarily concerned about continued abdominal pain for which she has already been seen in the emergency department this month and for which she has seen her primary care provider.  She tells me she is awaiting an appointment with gastroenterology which is not for another 2 months.  She tells me this is because she needs to go to the [facility] because she \"will not go back to\" [facility].  I encouraged her to avoid alcohol which will exacerbate her abdominal pain and keep her appointment as scheduled.  I encouraged her to follow-up with her primary care provider and return with new concerns.  She verbalized understanding and is amenable to discharge.    Patient was calm, cooperative, and without complaint while under my care. She required no interventions including no chemical or physical restraint.          Mary Lloyd MD  05/22/21 0951    "

## 2021-05-22 NOTE — ED NOTES
Pt sleeping since 3 am , told her if she can find a sober ride she can go. Pt given back her cell phone

## 2021-05-22 NOTE — ED PROVIDER NOTES
History   Chief Complaint:  Abdominal Pain and Alcohol Intoxication     HPI   Swetha Patterson is a 51 year old female with history of hypertension, hyperlipidemia, COPD, GERD, and diabetes who presents with abdominal pain and intoxication. The patient reports that she and her sister were out drinking together this evening, and EMS reports that she was found wandering alone after she called 911 herself. While here, she reports abdominal pain which radiates into her back as well as nausea and diaphoresis. She denies vomiting, chest pain, shortness of breath, and fever. The patient states that she had only 2 alcoholic beverages around 2030.  To note, the patient was seen in this emergency department 11 days ago with similar symptoms, at which time her workup was negative (see below).    Imaging Results, [facility] ED, 5/12/2021  Abd/Pelvis CT, IV Contrast Only Trauma / AAA:  IMPRESSION:   1.  No etiology for symptoms. Nothing obstructive or inflammatory involving bowel.   2.  Fatty infiltration of liver.   3.  Small kidney stones bilaterally, no ureteral stone or hydronephrosis.  Per radiology.      Laboratory Results, [facility] ED, 5/12/2021  CBC: WBC 12.0 high, HGB 13.8,    CMP: ALT 60 high, o/w WNL (Creatinine 0.98)   Troponin (Collected 1733): <0.015   Lipase: 169   HCG Qualitative Serum: Negative     Review of Systems   Constitutional: Positive for diaphoresis. Negative for fever.   Respiratory: Negative for shortness of breath.    Cardiovascular: Negative for chest pain.   Gastrointestinal: Positive for abdominal pain and nausea. Negative for vomiting.   All other systems reviewed and are negative.    Allergies:  Codeine  Cyclobenzaprine  Hydrocodone  Sulfa Drugs  Gabapentin      Medications:  Tylenol   Albuterol nebulizer   Lipitor   Wellbutrin XL   Cetirizine   Klonopin   Flexeril   Cymbalta  Trelegy ellipta inhaler   Lasix   Glipizide  Hydroxyzine   Lamictal  Lisinopril   Metformin   Robaxin    Singulair   Nitroglycerin   Zofran  Omeprazole  Miralax  Prednisone   Compazine   Valtrex   Ventolin inhaler      Past Medical History:    Anxiety  Asthma  Chronic pain  COPD   Diabetes mellitus, type II   Depression   Emphysema with chronic bronchitis   Esophageal reflux   Fibromyalgia  GERD   Hypertension  Hyperlipidemia   Liver disease   Peripheral artery disease   DIAZ       Past Surgical History:    Arthroscopy shoulder decompression left   Biopsy artery temporal left   Bronchial thermoplasty  Appendectomy  Colonoscopy   Discectomy, fusion cervical anterior one level, combined C5-C6   Polyp removed from vocal cords  EGD  Excise node mediastinal  Laparoscopic cholecystectomy  Thoracoscopy   Tonsillectomy      Family History:    Heart disease - Mother   Hypertension - Mother and sister   Cerebrovascular disease - Mother and sister   Depression - Mother   Gallbladder disease - Mother   Asthma - Mother and sister      Social History:  The patient presents to the ED alone.     Physical Exam     Patient Vitals for the past 24 hrs:   BP Temp Temp src Pulse Resp SpO2   05/22/21 0542 100/55 97.9  F (36.6  C) Temporal 88 20 98 %   05/22/21 0400 98/52 -- -- 82 16 98 %   05/22/21 0300 96/50 -- -- -- 16 98 %     Physical Exam  General: Intoxicated, appears well-developed and well-nourished. Cooperative.     In mild distress  HEENT:  Head:  Atraumatic  Ears:  External ears are normal  Mouth/Throat:  Oropharynx is without erythema or exudate and mucous membranes are moist.   Eyes:   Conjunctivae normal and EOM are normal. No scleral icterus.  CV:  Normal rate, regular rhythm, normal heart sounds and radial pulses are 2+ and symmetric.  No murmur.  Resp:  Breath sounds are clear bilaterally    Non-labored, no retractions or accessory muscle use  GI:  Obese.  Abdomen is soft, no distension, epigastric tenderness. No rebound or guarding.  No CVA tenderness bilaterally  MS:  Normal range of motion. No edema.    Normal strength  in all 4 extremities.     Back atraumatic.    No midline cervical, thoracic, or lumbar tenderness  Skin:  Warm and dry.  No rash or lesions noted.  Neuro: Intoxicated. Normal strength.  GCS: 14  Psych:  Normal mood and affect.    Emergency Department Course   Laboratory:   CBC: WBC: 10.0, HGB: 12.4, PLT: 349    CMP: Alkaline Phosphatase: 154 (H), ALT: 80 (H), o/w WNL (Creatinine: 1.01)    Lipase: 101    Alcohol ethyl: 0.17 (H)    Emergency Department Course:  Reviewed:  I reviewed the patient's nursing notes, vitals, past medical records, Care Everywhere.     Assessments:  0116 I performed an exam of the patient and obtained history, as documented above.     0555 I rechecked the patient prior to shift change.    Interventions:  0149 NS 1 L IV Bolus   Toradol 15 mg IV    0150 Zofran 4 mg IV   Pepcid 20 mg IV    Disposition:  Care of the patient was transferred to my colleague Dr. Lloyd pending clinical sobriety.     Impression & Plan   Medical Decision Making:  Swetha Patterson is a 51 year old female who presents for evaluation of alcohol abuse and epigastric abdominal pain.  She is intoxicated here in ED by blood work.  Blood work otherwise looks ok; no signs of alcoholic ketoacidosis, significant liver impairment or acute alcoholic hepatitis. She has no history of DT's or alcohol withdrawal seizures. There are no signs of co-ingestion including acetaminophen, drugs, medications, volatile alcohols. She has no signs of trauma related to alcohol use and no further workup is needed including head CT.  She also has epigastric pain which I think is secondary to alcoholic gastritis.  She had a CT scan in recent weeks which was unremarkable.  Patient continues to sober while under my care and upon clinical sobriety would be safe for discharge home with plans for close outpatient follow-up with her primary care provider.  Unfortunately, we've been unable to reach the patient's sister who lives in [place] who would be a  sober ride for the patient this evening.  Patient care signed out to my partner Dr. Lloyd awaiting clinical sobriety and discharge.      Diagnosis:    ICD-10-CM    1. Epigastric pain  R10.13 Comprehensive metabolic panel     Lipase     CBC with platelets differential     Alcohol ethyl   2. Alcoholic intoxication without complication (H)  F10.920        Scribe Disclosure:  PRICE, Marlen Chan, am serving as a scribe at 1:16 AM on 5/22/2021 to document services personally performed by Carson Su MD based on my observations and the provider's statements to me.     May 22, 2021   [facility] Emergency Department     Carson Su MD  05/22/21 0616

## 2021-05-22 NOTE — DISCHARGE INSTRUCTIONS
Do not drink alcohol. This will make your abdominal pan worse.   Keep appointment with GI as scheduled. Follow up with your doctor in the meantime.  Return with new concerns.      Discharge Instructions  Alcohol Intoxication    You have been seen today with alcohol intoxication. This means that you have enough alcohol in your system to impair your ability to mentally and physically function, perhaps to the extent that you were unable to care for yourself.    Generally, every Emergency Department visit should have a follow-up clinic visit with either a primary or a specialty clinic/provider. Please follow-up as instructed by your emergency provider today.    You may have come to the Emergency Department because of your intoxication, or for another reason, such as because of an injury. No matter what the case is, this visit is a  red flag  regarding alcohol use, and you should consider whether your drinking pattern is a problem for you.     You may be at risk for alcohol-related problems if:    Men: you drink more than 14 drinks per week, or more than 4 drinks per occasion.    Women: you drink more than 7 drinks per week or more than 3 drinks per occasion.    You have black-outs.  You do things you regret while drinking.  You have legal problems because of drinking.  You have job problems because of drinking (you call in sick to work because of drinking).    CAGE Questions  Have you ever felt you should cut down on your drinking?  Have people annoyed you by criticizing your drinking?  Have you ever felt bad or guilty about your drinking?  Have you ever had a drink first thing in the morning to steady your nerves or get rid of a hangover (eye opener)?    If you answer yes to any of the CAGE questions, you may have a problem with alcohol.      Return to the Emergency Department if:  You become shaky or tremble when you try to stop drinking.   You have severe abdominal pain (belly pain).   You have a seizure or pass out.     You vomit (throw up) blood or have blood in your stool. This may be bright red or it may look like black coffee grounds.  You become lightheaded or faint.      For further help, contact:   Your caregiver.    Alcoholics Anonymous (AA).    [facility] Intergroup: [phone]  [facility] Central Office: [phone]   A drug or alcohol rehabilitation program.    You can get information on alcohol resources and groups by calling the number 211 or [phone] on any phone.     Seek medical care if:  You have persistent vomiting.   You have persistent pain in any part of your body.    You do not feel better after a few days.    If you were given a prescription for medicine here today, be sure to read all of the information (including the package insert) that comes with your prescription.  This will include important information about the medicine, its side effects, and any warnings that you need to know about.  The pharmacist who fills the prescription can provide more information and answer questions you may have about the medicine.  If you have questions or concerns that the pharmacist cannot address, please call or return to the Emergency Department.   Remember that you can always come back to the Emergency Department if you are not able to see your regular doctor in the amount of time listed above, if you get any new symptoms, or if there is anything that worries you.

## 2021-05-22 NOTE — ED TRIAGE NOTES
Pt here for abd pain and potential etoh intoxication. Pt states that she was at her sister's house when her abd started hurting. A+Ox4, no acute signs of distress.

## 2021-05-22 NOTE — ED NOTES
Pt searched per protocol. Belongings placed into locker 47. Pt calm and cooperative with cares. States that her sister Marlee or step brother could pick her up, but she does not have their number.

## 2021-05-24 ENCOUNTER — TELEPHONE (OUTPATIENT)
Dept: INTERNAL MEDICINE | Facility: CLINIC | Age: 51
End: 2021-05-24

## 2021-05-24 NOTE — TELEPHONE ENCOUNTER
Received a letter from [facility] saying this patient has a [employer] and her name is Cherelle Phoenix. Cherelle can be reached at [phone].

## 2021-05-25 ENCOUNTER — VIRTUAL VISIT (OUTPATIENT)
Dept: INTERNAL MEDICINE | Facility: CLINIC | Age: 51
End: 2021-05-25
Payer: COMMERCIAL

## 2021-05-25 ENCOUNTER — TELEPHONE (OUTPATIENT)
Dept: INTERNAL MEDICINE | Facility: CLINIC | Age: 51
End: 2021-05-25

## 2021-05-25 VITALS — HEIGHT: 63 IN | BODY MASS INDEX: 42.55 KG/M2 | RESPIRATION RATE: 18 BRPM

## 2021-05-25 DIAGNOSIS — I10 HTN, GOAL BELOW 140/90: ICD-10-CM

## 2021-05-25 DIAGNOSIS — R06.02 SOB (SHORTNESS OF BREATH) ON EXERTION: ICD-10-CM

## 2021-05-25 DIAGNOSIS — R10.13 EPIGASTRIC PAIN: Primary | ICD-10-CM

## 2021-05-25 DIAGNOSIS — R11.2 NAUSEA AND VOMITING, INTRACTABILITY OF VOMITING NOT SPECIFIED, UNSPECIFIED VOMITING TYPE: ICD-10-CM

## 2021-05-25 PROCEDURE — 99214 OFFICE O/P EST MOD 30 MIN: CPT | Mod: 95 | Performed by: INTERNAL MEDICINE

## 2021-05-25 ASSESSMENT — ASTHMA QUESTIONNAIRES
HOSPITALIZATION_OVERNIGHT_LAST_YEAR_TOTAL: ONE
QUESTION_4 LAST FOUR WEEKS HOW OFTEN HAVE YOU USED YOUR RESCUE INHALER OR NEBULIZER MEDICATION (SUCH AS ALBUTEROL): THREE OR MORE TIMES PER DAY
QUESTION_1 LAST FOUR WEEKS HOW MUCH OF THE TIME DID YOUR ASTHMA KEEP YOU FROM GETTING AS MUCH DONE AT WORK, SCHOOL OR AT HOME: MOST OF THE TIME
EMERGENCY_ROOM_LAST_YEAR_TOTAL: ONE
QUESTION_2 LAST FOUR WEEKS HOW OFTEN HAVE YOU HAD SHORTNESS OF BREATH: MORE THAN ONCE A DAY
QUESTION_3 LAST FOUR WEEKS HOW OFTEN DID YOUR ASTHMA SYMPTOMS (WHEEZING, COUGHING, SHORTNESS OF BREATH, CHEST TIGHTNESS OR PAIN) WAKE YOU UP AT NIGHT OR EARLIER THAN USUAL IN THE MORNING: TWO OR THREE NIGHTS A WEEK
ACUTE_EXACERBATION_TODAY: NO

## 2021-05-25 NOTE — TELEPHONE ENCOUNTER
Pt calling stating she wants to follow with her provider today for her on going GI issues she would like to go a video visit so she can get a referral to have a scope done ASAP   Appointment made for today       Lis Brian RN, BSN  [facility]

## 2021-05-25 NOTE — PATIENT INSTRUCTIONS
Plan:  1. Heart stress test -- To schedule this test please call [facility] at: [phone]   2. Gastric emptying study -- To schedule this test you may call Scheduling center at [phone]    -- schedule this test at least 2 weeks after the heart test  3. Endoscopy

## 2021-05-25 NOTE — PROGRESS NOTES
Swetha is a 51 year old who is being evaluated via a billable video visit.      How would you like to obtain your AVS?   If the video visit is dropped, the invitation should be resent by: Text to cell phone: .  Will anyone else be joining your video visit? No        This is a VIDEO ( using Doximity)  encounter with the patient.       Location of the provider : office   Location of the patient : home      17:00 --- 17:25               Dr Low's note      Patient's instructions / PLAN:                                                        Plan:  1. Heart stress test -- To schedule this test please call [facility] at: [phone]   2. Gastric emptying study -- To schedule this test you may call Scheduling center at [phone]    -- schedule this test at least 2 weeks after the heart test  3. Endoscopy         ASSESSMENT & PLAN:                                                      (R10.13) Epigastric pain  (primary encounter diagnosis)  Comment: ? Angina equivalent ?   Plan: NM Gastric Emptying, NM Lexiscan stress test,         GASTROENTEROLOGY ADULT REF PROCEDURE ONLY            (R06.02) SOB (shortness of breath) on exertion  Comment:   Plan: NM Lexiscan stress test            (I10) HTN, goal below 140/90  Comment:   Plan: NM Lexiscan stress test            (R11.2) Nausea and vomiting, intractability of vomiting not specified, unspecified vomiting type  Comment:   Plan: NM Gastric Emptying, GASTROENTEROLOGY ADULT REF        PROCEDURE ONLY               Chief complaint:                                                      epig pain     SUBJECTIVE:                                                    History of present illness:    I am sick of been sick.  She c/o of stomach pain. As soon as she starts moving around the stomach hurts, she sweats profusely and then she feels nausea. She wants an exploratory laparoscopy.     In ER 3 days ago w abd pain and ETOH intox ( no complications)      LOV: Plan:  1. Excessive  "sweating can be associated with Duloxetine / Cymbalta   2. Carafate/ sucralfate 1 gram 4 times a day   3. Referral to gastro dept at [facility]   4.  Labs today - [address]   5. No changes in the other meds          Review of Systems:                                                      ROS: negative for fever, chills, cough, wheezes, chest pain, shortness of breath, vomiting, leg swellingPos for abd pain, as above       OBJECTIVE:           An actual physical exam can't be done during phone visit   A limited exam can sometimes be performed by video visit   NAD      PMHx: reviewed  Past Medical History:   Diagnosis Date     Anxiety state, unspecified      Asthma      Chronic pain     back pain from cyst     Contact dermatitis and other eczema, due to unspecified cause      COPD (chronic obstructive pulmonary disease) (H)      Depressive disorder, not elsewhere classified      Diabetes (H)      Emphysema with chronic bronchitis (H)      Esophageal reflux      Family history of ischemic heart disease      Fibromyalgia      Gastro-oesophageal reflux disease      History of emphysema      Hoarseness      HTN, goal below 140/90      Hyperlipidemia LDL goal <130      Liver disease     \"fatty liver\"     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)      PONV (postoperative nausea and vomiting)      Rectal bleeding       PSHx: reviewed  Past Surgical History:   Procedure Laterality Date     ARTHROSCOPY SHOULDER DECOMPRESSION Left 10/21/2015    Procedure: ARTHROSCOPY SHOULDER DECOMPRESSION;  Surgeon: Julien Milian MD;  Location: [facility] OR     BIOPSY ARTERY TEMPORAL Left 3/11/2020    Procedure: LEFT TEMPORAL ARTERY BIOPSY;  Surgeon: Ibeth Conner MD;  Location: [facility] OR     BRONCHIAL THERMOPLASTY N/A 11/14/2014    Procedure: BRONCHIAL THERMOPLASTY;  Surgeon: Ward Whitaker MD;  [facility]     BRONCHIAL THERMOPLASTY N/A 12/19/2014    Procedure: BRONCHIAL THERMOPLASTY;  Surgeon: Ward Whitaker MD;  [facility]     BRONCHIAL THERMOPLASTY N/A 2/6/2015    Procedure: BRONCHIAL THERMOPLASTY;  Surgeon: Ward Whitaker MD;  [facility]     C APPENDECTOMY  at age 17     COLONOSCOPY N/A 11/26/2018    Procedure: COLONOSCOPY (Formerly [facility]);  Surgeon: Marshall Oakes MD;  Location: [facility] OR     COLONOSCOPY N/A 10/22/2020    Procedure: Colonoscopy;  Surgeon: Marshall Mccormick MD;  Location: [facility] OR     DISCECTOMY, FUSION CERVICAL ANTERIOR ONE LEVEL, COMBINED N/A 5/1/2018    Procedure: COMBINED DISCECTOMY, FUSION CERVICAL ANTERIOR ONE LEVEL;  1.  C5-C6 anterior cervical diskectomy and fusion.    2.  C5-C6 application of intervertebral biomechanical device for interbody fusion purposes.    3.  C5-C6 anterior instrumentation using the standard Kristin InViZia 24 mm plate with four associated bone screws, 12 mm in length.  Inferior screws are fixed.  Superior screws are va     ENT SURGERY  2015    polyps removed from vocal cords      ESOPHAGOSCOPY, GASTROSCOPY, DUODENOSCOPY (EGD), COMBINED N/A 10/22/2020    Procedure: Esophagoscopy, gastroscopy, duodenoscopy with biopsies;  Surgeon: Marshall Mccormick MD;  Location:  OR     EXCISE NODE MEDIASTINAL  4/26/2013    Procedure: EXCISE NODE MEDIASTINAL;;  Surgeon: Av Peña MD;  Location:  OR     LAPAROSCOPIC CHOLECYSTECTOMY N/A 10/19/2017    Procedure: LAPAROSCOPIC CHOLECYSTECTOMY;  LAPAROSCOPIC CHOLECYSTECTOMY;  Surgeon: Ney Jerry MD;  Location:  OR     THORACOSCOPY  4/26/2013    Procedure: THORACOSCOPY;  LEFT VIDEO ASSISTED THORACOSCOPY, RESECTION OF POSTERIOR MEDIASTINAL MASS;  Surgeon: Av Peña MD;  Location:  OR     TONSILLECTOMY  as a kid     TONSILLECTOMY          Meds: reviewed  Current Outpatient Medications   Medication Sig Dispense Refill     acetaminophen (TYLENOL) 325 MG tablet Take 3 tablets (975 mg) by mouth every 6 hours as needed for pain 50 tablet 0     albuterol (PROVENTIL) (2.5 MG/3ML) 0.083% neb solution Take 1 vial (2.5  mg) by nebulization every 6 hours as needed for shortness of breath / dyspnea or wheezing 25 vial 3     atorvastatin (LIPITOR) 80 MG tablet TAKE ONE TABLET BY MOUTH EVERY DAY 90 tablet 0     benzoyl peroxide (ACNE-CLEAR) 10 % external gel Apply topically 2 times daily as needed       blood glucose (ACCU-CHEK ALLYSON PLUS) test strip USE TO TEST BLOOD SUGAR ONE TIME DAILY OR AS DIRECTED 100 each 0     blood glucose (NO BRAND SPECIFIED) lancets standard Use to test blood sugar 1 times daily or as directed. 100 each 3     blood glucose (NO BRAND SPECIFIED) lancets standard Use to test blood sugar 1 time daily 100 each 1     blood glucose (NO BRAND SPECIFIED) test strip Use to test blood sugar 1 times daily or as directed.  Dispense Accu-chek Allyson Plus or per patient insurance 100 strip 3     blood glucose monitoring (NO BRAND SPECIFIED) meter device kit Use to test blood sugar 1 times daily or as directed.  Dispense Accu-chek Allyson Plus or per insurance preference 1 kit 0     blood glucose monitoring (NO BRAND SPECIFIED) meter device kit Use to test blood sugar 1 times daily or as directed. 1 kit 0     buPROPion (WELLBUTRIN XL) 150 MG 24 hr tablet TAKE ONE TABLET BY MOUTH EVERY MORNING 90 tablet 2     cetirizine HCl 10 MG CAPS Take 1 capsule (10 mg) by mouth daily as needed Takes in the spring. 90 capsule 3     clindamycin 1 % EX external gel Apply topically 2 times daily 60 g 3     clonazePAM (KLONOPIN) 1 MG tablet Take 1 tablet (1 mg) by mouth 2 times daily as needed for anxiety She needs to see her PCP to get more tablets 5 tablet 0     cyclobenzaprine (FLEXERIL) 5 MG tablet Take 1 tablet (5 mg) by mouth 3 times daily as needed for muscle spasms 30 tablet 1     desonide (DESOWEN) 0.05 % external cream Apply topically daily       DULoxetine (CYMBALTA) 20 MG capsule Take 1 capsule (20 mg) by mouth daily X 7 days and if well tolerated increase to 40 mg daily. 53 capsule 1     fluticasone (FLONASE) 50 MCG/ACT nasal spray  Spray 2 sprays in nostril daily 16 g 5     Fluticasone-Umeclidin-Vilanterol (TRELEGY ELLIPTA) 100-62.5-25 MCG/INH oral inhaler Inhale 1 puff into the lungs daily 28 each 3     furosemide (LASIX) 20 MG tablet TAKE ONE TABLET BY MOUTH EVERY DAY 90 tablet 0     glipiZIDE (GLUCOTROL XL) 2.5 MG 24 hr tablet TAKE ONE TABLET BY MOUTH EVERY DAY 90 tablet 1     hydrocortisone 2.5 % cream APPLY TOPICALLY TO THE AFFECTED AREA(S) TWO TIMES A DAY 30 g 1     hydrOXYzine (ATARAX) 50 MG tablet TAKE TWO TABLETS BY MOUTH THREE TIMES A DAY AS NEEDED FOR ANXIETY MAX 70 TABLETS PER WEEK 70 tablet 5     lamoTRIgine (LAMICTAL) 100 MG tablet 100 mg daily        lisinopril (ZESTRIL) 20 MG tablet TAKE ONE TABLET BY MOUTH EVERY DAY 90 tablet 0     metFORMIN (GLUCOPHAGE) 500 MG tablet TAKE ONE TABLET BY MOUTH EVERY DAY WITH BREAKFAST 90 tablet 1     methocarbamol (ROBAXIN) 500 MG tablet Take 1 tablet (500 mg) by mouth 3 times daily as needed for muscle spasms 120 tablet 0     montelukast (SINGULAIR) 10 MG tablet Take 1 tablet (10 mg) by mouth At Bedtime 90 tablet 4     nicotine (NICORETTE) 2 MG gum Place 1 each (2 mg) inside cheek as needed for smoking cessation 150 each 1     nitroGLYcerin (NITROSTAT) 0.4 MG sublingual tablet PLACE ONE TABLET UNDER THE TONGUE AT THE 1ST SIGN OF ATTACK. IF PAIN IS UNRELIEVED OR WORSENED 5 MINUTES AFTER 1ST DOSE, PROMPT MEDICAL ASSISTANCE IS NEEDED. MAY REPEAT EVERY 5 MINUTES UNTIL PAIN IS RELIEVED. MAX OF THREE DOSES 25 tablet 3     nystatin (MYCOSTATIN) 662039 UNIT/GM external powder APPLY TOPICALLY TWO TIMES A DAY AS NEEDED FOR SKIN RASH 45 g 0     omeprazole 20 MG tablet TAKE ONE TABLET BY MOUTH TWICE A DAY 60 tablet 0     ondansetron (ZOFRAN ODT) 4 MG ODT tab Take 1 tablet (4 mg) by mouth every 6 hours as needed for nausea or vomiting 12 tablet 0     ondansetron (ZOFRAN) 8 MG tablet TAKE ONE-HALF TO ONE TABLET BY MOUTH EVERY 8 HOURS AS NEEDED FOR NAUSEA 30 tablet 1     order for DME Please provide oximeter  1 Device 0     order for DME Equipment being ordered: Digital home blood pressure monitor kit 1 Device 0     order for DME Equipment being ordered: Pulse Oxymeter 1 Device 0     polyethylene glycol (MIRALAX) 17 GM/Dose powder Take 17 g (1 capful) by mouth daily 507 g 1     predniSONE (DELTASONE) 10 MG tablet -- take 2 tablets twice a day for 3 days then -- take 2 tablets daily for 10 days, -- then take 1 tab daily for 10 days 42 tablet 0     prochlorperazine (COMPAZINE) 10 MG tablet Take 1 tablet (10 mg) by mouth every 8 hours as needed for nausea or vomiting 30 tablet 0     sucralfate (CARAFATE) 1 GM tablet Take 1 tablet (1 g) by mouth 4 times daily 120 tablet 1     sucralfate (CARAFATE) 1 GM/10ML suspension Take 10 mLs (1 g) by mouth 4 times daily as needed for nausea (Pain) 420 mL 0     sulfamethoxazole-trimethoprim (BACTRIM DS) 800-160 MG tablet Take 1 tablet by mouth 2 times daily Take one tablet twice daily. For additional refills, please schedule a follow-up appointment. 180 tablet 1     terbinafine (LAMISIL) 1 % external cream Apply topically 2 times daily 30 g 2     valACYclovir (VALTREX) 1000 mg tablet Take 2 tablets (2,000 mg) by mouth 2 times daily for 1 day 4 tablet 0     VENTOLIN  (90 Base) MCG/ACT inhaler INHALE TWO PUFFS BY MOUTH EVERY 6 HOURS AS NEEDED FOR SHORTNESS OF BREATH 18 g 1       Soc Hx: reviewed  Fam Hx: reviewed          Roro Low MD  Internal Medicine

## 2021-05-27 ENCOUNTER — TRANSFERRED RECORDS (OUTPATIENT)
Dept: HEALTH INFORMATION MANAGEMENT | Facility: CLINIC | Age: 51
End: 2021-05-27

## 2021-05-27 ENCOUNTER — TELEPHONE (OUTPATIENT)
Dept: INTERNAL MEDICINE | Facility: CLINIC | Age: 51
End: 2021-05-27

## 2021-05-27 DIAGNOSIS — R10.13 EPIGASTRIC PAIN: Primary | ICD-10-CM

## 2021-05-27 LAB — RETINOPATHY: NEGATIVE

## 2021-05-27 NOTE — TELEPHONE ENCOUNTER
Patient calls and requesting orders be put in for [facility] OR to have Endoscopy done under anesthesia because she is a bad patient and starts pulling IV's and tubes out and also has lung disease.  Patient could not get in for two months at [facility] but spoke to Brianda at [facility] ([phone]) and they can get her in sooner if orders are sent and preop is done.  Please advise patient once orders are in and signed.

## 2021-05-28 ENCOUNTER — HOSPITAL ENCOUNTER (OUTPATIENT)
Dept: CT IMAGING | Facility: CLINIC | Age: 51
Discharge: HOME | End: 2021-05-28
Attending: INTERNAL MEDICINE | Admitting: INTERNAL MEDICINE
Payer: COMMERCIAL

## 2021-05-28 DIAGNOSIS — R73.03 PREDIABETES: ICD-10-CM

## 2021-05-28 DIAGNOSIS — J44.1 COPD EXACERBATION (H): ICD-10-CM

## 2021-05-28 DIAGNOSIS — R05.3 CHRONIC COUGH: ICD-10-CM

## 2021-05-28 DIAGNOSIS — R06.02 SOB (SHORTNESS OF BREATH): ICD-10-CM

## 2021-05-28 LAB
CATECHOLS UR-IMP: ABNORMAL
COLLECT DURATION TIME SPEC: ABNORMAL H
CREAT 24H UR-MRATE: ABNORMAL MG/D (ref 500–1400)
CREAT UR-MCNC: 82 MG/DL
DOPAMINE 24H UR-MRATE: ABNORMAL UG/D (ref 71–485)
DOPAMINE UR-MCNC: 3 UG/L
DOPAMINE/CREAT UR: 72 UG/G CRT (ref 0–250)
EPINEPH 24H UR-MRATE: ABNORMAL (ref 1–14)
EPINEPH UR-MCNC: 59 UG/L
EPINEPH/CREAT UR: 4 UG/G CRT (ref 0–20)
NOREPINEPH 24H UR-MRATE: ABNORMAL UG/D (ref 14–120)
NOREPINEPH UR-MCNC: 44 UG/L
NOREPINEPH/CREAT UR: 54 UG/G CRT (ref 0–45)
SPECIMEN VOL ?TM UR: 30 ML

## 2021-05-28 PROCEDURE — 71250 CT THORAX DX C-: CPT

## 2021-05-28 RX ORDER — GLIPIZIDE 2.5 MG/1
TABLET, EXTENDED RELEASE ORAL
Qty: 90 TABLET | Refills: 1 | Status: SHIPPED | OUTPATIENT
Start: 2021-05-28 | End: 2021-12-13

## 2021-05-28 NOTE — TELEPHONE ENCOUNTER
Pending Prescriptions:                       Disp   Refills    glipiZIDE (GLUCOTROL XL) 2.5 MG 24 hr tabl*90 tab*1        Sig: TAKE ONE TABLET BY MOUTH EVERY DAY    Routing refill request to provider for review/approval because:  Lab Results   Component Value Date    A1C 5.6 10/16/2020    A1C 6.0 02/20/2020    A1C 5.6 05/10/2019    A1C 5.4 03/21/2013

## 2021-06-01 ENCOUNTER — TELEPHONE (OUTPATIENT)
Dept: INTERNAL MEDICINE | Facility: CLINIC | Age: 51
End: 2021-06-01

## 2021-06-01 DIAGNOSIS — Z11.59 ENCOUNTER FOR SCREENING FOR OTHER VIRAL DISEASES: ICD-10-CM

## 2021-06-01 NOTE — TELEPHONE ENCOUNTER
Prior Authorization Retail Medication Request    Medication/Dose: omeprazole 20 MG tablet  ICD code (if different than what is on RX):     Previously Tried and Failed:     Rationale:       Insurance Name:  [employer]  Insurance ID:  [id number]  BIN:  [id number]  PCN:  [id number]  Group:  [id number]  CoverMyMeds Key:  [id number]      Pharmacy Information (if different than what is on RX)  Name:     Phone:

## 2021-06-02 NOTE — TELEPHONE ENCOUNTER
Prior Authorization Approval    Authorization Effective Date: 5/3/2021  Authorization Expiration Date: 6/1/2024  Medication: omeprazole 20 MG tablet  Approved Dose/Quantity:   Reference #: [id number]   Insurance Company: [employer] - Phone [phone] Fax [phone]  Expected CoPay:       CoPay Card Available:      Foundation Assistance Needed:    Which Pharmacy is filling the prescription (Not needed for infusion/clinic administered): [facility] [address] [place] [address] [facility]  Pharmacy Notified: Yes  Patient Notified: Yes  **Instructed pharmacy to notify patient when script is ready to /ship.**

## 2021-06-02 NOTE — TELEPHONE ENCOUNTER
PA Initiation    Medication: omeprazole 20 MG tablet  Insurance Company: [employer] - Phone [phone] Fax [phone]  Pharmacy Filling the Rx: [facility] [place]  [address] [place] [address]  Filling Pharmacy Phone: [phone]  Filling Pharmacy Fax: [phone]  Start Date: 6/2/2021

## 2021-06-07 ENCOUNTER — TELEPHONE (OUTPATIENT)
Dept: INTERNAL MEDICINE | Facility: CLINIC | Age: 51
End: 2021-06-07

## 2021-06-07 DIAGNOSIS — R60.0 BILATERAL LEG EDEMA: ICD-10-CM

## 2021-06-07 RX ORDER — FUROSEMIDE 20 MG
TABLET ORAL
Qty: 90 TABLET | Refills: 0 | Status: SHIPPED | OUTPATIENT
Start: 2021-06-07 | End: 2021-06-25

## 2021-06-07 NOTE — TELEPHONE ENCOUNTER
"Melania from [facility] calling for verbal orders for SN, 1 x week for 9 weeks with 3 PRN. BP today was 154/102 but this could be because just took her AM medications 15 mins prior to RN arriving. Patient did have some non-pitting edema and is wondering if Lasix PRN needs to be ordered. Patient stated she had \"flinstone feet\" yesterday and she only ran one errand. Patient also has toenail fungus and Melania is asking for medication to be prescribed and sent to [facility] pharmacy in [place]. Patient did ask Melania if Dr Low would admit her to the hospital so she could have all these tests done instead of having to make all these separate appointments. Call Melania at [phone]  "

## 2021-06-08 ENCOUNTER — OFFICE VISIT (OUTPATIENT)
Dept: PULMONOLOGY | Facility: CLINIC | Age: 51
End: 2021-06-08
Attending: INTERNAL MEDICINE
Payer: COMMERCIAL

## 2021-06-08 VITALS
RESPIRATION RATE: 18 BRPM | WEIGHT: 240 LBS | HEART RATE: 84 BPM | BODY MASS INDEX: 42.51 KG/M2 | DIASTOLIC BLOOD PRESSURE: 74 MMHG | SYSTOLIC BLOOD PRESSURE: 109 MMHG | OXYGEN SATURATION: 94 %

## 2021-06-08 DIAGNOSIS — J45.40 MODERATE PERSISTENT ASTHMA, UNSPECIFIED WHETHER COMPLICATED: Primary | ICD-10-CM

## 2021-06-08 DIAGNOSIS — R49.0 HOARSENESS OF VOICE: ICD-10-CM

## 2021-06-08 DIAGNOSIS — J44.1 COPD EXACERBATION (H): ICD-10-CM

## 2021-06-08 DIAGNOSIS — E66.01 MORBID OBESITY (H): ICD-10-CM

## 2021-06-08 DIAGNOSIS — G47.19 EXCESSIVE DAYTIME SLEEPINESS: ICD-10-CM

## 2021-06-08 DIAGNOSIS — J38.1 VOCAL CORD POLYP: ICD-10-CM

## 2021-06-08 DIAGNOSIS — R06.09 DYSPNEA ON EXERTION: ICD-10-CM

## 2021-06-08 PROCEDURE — 99215 OFFICE O/P EST HI 40 MIN: CPT | Mod: 25 | Performed by: INTERNAL MEDICINE

## 2021-06-08 PROCEDURE — G0463 HOSPITAL OUTPT CLINIC VISIT: HCPCS

## 2021-06-08 ASSESSMENT — ENCOUNTER SYMPTOMS
LOSS OF CONSCIOUSNESS: 0
MUSCLE CRAMPS: 1
RECTAL PAIN: 1
EYE PAIN: 0
SNORES LOUDLY: 1
INCREASED ENERGY: 1
DOUBLE VISION: 0
FATIGUE: 1
SMELL DISTURBANCE: 0
SYNCOPE: 0
ABDOMINAL PAIN: 1
POSTURAL DYSPNEA: 1
NAIL CHANGES: 1
EYE WATERING: 0
DECREASED APPETITE: 0
POLYDIPSIA: 1
MUSCLE WEAKNESS: 1
COUGH DISTURBING SLEEP: 1
HALLUCINATIONS: 0
BLOATING: 1
WEIGHT LOSS: 0
HOARSE VOICE: 1
BACK PAIN: 1
DYSPNEA ON EXERTION: 1
BOWEL INCONTINENCE: 0
TINGLING: 1
HEADACHES: 0
DEPRESSION: 1
WEIGHT GAIN: 1
NUMBNESS: 1
DISTURBANCES IN COORDINATION: 0
EXERCISE INTOLERANCE: 1
MYALGIAS: 1
EYE REDNESS: 0
HEARTBURN: 1
SPEECH CHANGE: 0
WEAKNESS: 1
HYPOTENSION: 0
SINUS PAIN: 0
DIZZINESS: 0
ARTHRALGIAS: 1
ALTERED TEMPERATURE REGULATION: 1
NECK MASS: 0
PANIC: 1
INSOMNIA: 1
COUGH: 1
FEVER: 0
LEG PAIN: 1
WHEEZING: 1
EYE IRRITATION: 0
NAUSEA: 1
PALPITATIONS: 1
SLEEP DISTURBANCES DUE TO BREATHING: 0
SEIZURES: 0
DECREASED CONCENTRATION: 1
TROUBLE SWALLOWING: 1
SKIN CHANGES: 1
CHILLS: 1
STIFFNESS: 1
SORE THROAT: 1
ORTHOPNEA: 1
JOINT SWELLING: 1
SHORTNESS OF BREATH: 1
LIGHT-HEADEDNESS: 0
SINUS CONGESTION: 1
BLOOD IN STOOL: 1
SPUTUM PRODUCTION: 1
TASTE DISTURBANCE: 1
CONSTIPATION: 0
POOR WOUND HEALING: 0
NECK PAIN: 1
HEMOPTYSIS: 0
MEMORY LOSS: 1
NERVOUS/ANXIOUS: 1
PARALYSIS: 0
VOMITING: 1
JAUNDICE: 0
HYPERTENSION: 1
POLYPHAGIA: 1
TREMORS: 0

## 2021-06-08 ASSESSMENT — PAIN SCALES - GENERAL: PAINLEVEL: EXTREME PAIN (8)

## 2021-06-08 NOTE — LETTER
6/8/2021         RE: Swetha Patterson  [id number]  [place] [address]        Dear Colleague,    Thank you for referring your patient, Swetha Patterson, to the [facility] [place]. Please see a copy of my visit note below.    [facility]- General Pulmonary Clinic Follow Up    Assessment and Plan:  Swetha Patterson is a 51 year old female with a history of COPD, adult onset asthma s/p bronchial thermoplasty in 2015, chronic epigastric pain, chronic pain syndrome, depression, anxiety, HTN, obesity, HLD, and pre-diabetes, who returns to clinic today for follow up of COPD and asthma     Probable COPD vs asthma: s/p bronchial thermoplasty in 2015. Frequent exacerbations, though normal spirometry most recently, mild obstruction in the past.  -return to ENT for irritable larynx syndrome and vocal cord issues/hoarsness. Wheeze is upper airway  -repeat Spirometry today  -repeat echo given fluid retention  -needs to work on weight loss and exercise  -Continue Trelegy, should only take daily. Rinse mouth out after use. Refilled  -use albuterol as needed for shortness of breath. If it isn't helping, don't use it! Should not use more than 4-6 times a day, inahler or nebulizer  -Will give flutter valve for mucous clearance today (states she doesn't have this).  -return in 4 months, sooner if needed.   -robitussin DM can be used as needed  -use daily allergy pill, flonase PRN  -needs better reflux control, continue PPI, working with GI. Endoscopy is scheduled.   -referral for sleep study.     Smoking:  -needs to quit smoking, counseled extensively  -needs to get father to quit smoking as well.     Excessive daytime sleepiness: unsure if snores.   -Sleep study referral    49 min spent on care today.     Rush Fraga MD   of Medicine  Division of Pulmonary, Critical Care, Allergy, and Sleep  Medicine  ______________________________________________________________  CC: COPD, asthma    HPI:    Swetha Patterson is a 51 year old female with a history of COPD, adult onset asthma s/p bronchial thermoplasty in 2015, chronic epigastric pain, chronic pain syndrome, depression, anxiety, HTN, obesity, HLD, and pre-diabetes, who returns to clinic today for follow up of COPD and asthma.    Brief summary (from prior visits):   Ms. Patterson established care with Dr. Grimm in July 2020. She had previously seen Dr. Williamson in 2013 for asthma and cough, and had worsening asthma control leading into 2014. Eventually she was taken for bronchial thermoplasty in Feb 2015 by IP. She subsequently had started smoking again, and had frequent exacerbations requiring antibiotics and steroids.     In the past, she was also evaluated by allergy (allergy to dust mites), and ENT for vocal cord nodules/cysts. She has been found to have irritable larynx syndrome, and SLP was recommended. She also has hiatal hernia and esophagitis.     In July 2020, complained of cough and dyspnea on exertion. She was told to use Trelegy inhaler, and aerobika and albuterol nebs BID were prescribed for mucous clearance. Flonase was to be used at night. She was referred to pulmonary rehab. She also needed ENT follow up, and was referred to sleep medicine. She was supposed to follow up in 3 months, but never followed up.     Interval history:   Since last visit, she has continued to have frequent COPD exacerbations. Ms. Patterson states her breathing has not been good. She feels she has been sick for the whole month, with frequent trips to the doctor and ED. She notes her oxygen is worse, the lowest it has been in 92% lately. She also notes more orthopnea. She is sleeping with her head propped up due to this, which makes her breathing better. She also notes worsening vocal change with more crackling present.     She does have a cough occasionally, and will cough up  clear thick sputum, that is quite sticky. These tend to occur out of the blue, rather than during a coughing fit. She does have a frequently have a cough, which can also be productive of white mucous or light green mucous. No hemoptysis. She denies rhinorrhea, nasal congestion. She does have some occasional sore throat. This gets worse with excessive talking. She is having chills and some sweats at times. No particular fevers. She also complains of ongoing nausea, and is having an endoscopy later this month. She doesn't feel her allergies are any worse than usual, and does take an allergy pill daily. She does have some chest pain as well, which typically occur in the front and back, and is tender to palpation. This is most notable after coughing.     She notes she was on prednisone for a whole month due to infections, and just finished a taper. She also did get two different antibiotics during that time- a course of azithromycin and a course of levofloxacin. She did not notice much benefit from the steroids or antibiotics.     She does get short of breath with exertion, typically after 20 min or so, and she needs to stop. Her legs are typically very tired at this point. She has trouble cleaning her whole apartment due to shortness of breath as well.     She is again trying to quit smoking, and trying to get her dad to smoke outside. She is currently using about 6 cigarettes a day. No vaping. She is quite sleepy during the day, but does not know if she snores.     She is gaining weight, about 30 pounds in the last 6-7 months. She feels this is due to prednisone. Appetite is high. Still having acid reflux, is on omeprazole for this. She also complains of frequent LE edema, which worsens throughout the day.     She is currently using albuterol (using nebulizer 3-4 times a day, occasional inhaler use if out and about as well (not every day)). She gets limited benefit- not very long- from her albuterol. She also uses  Trelegy, but using twice daily. She is also using another inhaler, but doesn't know the name of it.     Exposure History:  Tobacco: She continues to smoke, now 6 cigs per day. She has smoked for 35 years, always less than 1 ppd. She is trying to use nicotine gum to quit currently. She has tried Chantix in the past, but it didn't work due to her father smoking as well. Father smokes 2 PPD, mostly in the house.   Other inhaled substances: no vaping. No marijuana. No sandblasting, welding, etc. Notes that air fresheners and cleaning supplies tend to irritate the lungs.    Occupation: On disability since 2010, previously worked as . 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   Pets: cat at home. No birds.   Allergies: dust mite allergies.   Hobbies: walks, shopping.   Travel: no recent travel.   Home: Lives in an apartment with father. No known mold issues or water damage. No feather pillows or down comforters.     ROS: Complete 10 point ROS negative unless mentioned in HPI    Allergies and current medications: Reviewed and updated in EMR.   Past medical, surgical, social, and family histories: Personally reviewed and updated in EMR during this visit.     Physical Exam:  /74   Pulse 84   Resp 18   Wt 108.9 kg (240 lb)   LMP 04/15/2016 (Exact Date)   SpO2 94%   BMI 42.51 kg/m      General: Sitting in the chair in NAD  HEENT: anicteric, mask in place.   Neck: no palpable lymphadenopathy  Lymphatic: no cervical or supraclavicular lymphadenopathy   Chest: mild wheeze over throat noted, lung CTAB. Rare cough.   Cardiac: RRR no murmurs  Abdomen: Soft, obese, non tender, active BS  Extremities: No LE Edema, moving all extremities. No digital clubbing.   Neuro: A&Ox3, no focal defects. Speech/language wnl.   Skin: no rash noted on limited exam  Psych: normal affect.     Labs and Radiology: All new data personally reviewed and summarized below. I have reviewed the results with the patient today.   All laboratory data  reviewed   5/22/21 K 3.6 cr 1.01. ALk phos 154, ALT 80, AST 21  WBC 10, Hgb 12.4, plts 349. Abs eos 0.   5/20/21 Quant gold negative  TSH 1.48    All cardiac studies reviewed by me.   6/19/19 NM stress test: 1.  Myocardial perfusion imaging using single isotope technique demonstrated normal myocardial perfusion. 2. Gated images demonstrated normal wall motion.  The left ventricular systolic function is normal with a calculated ejection fraction of 79%.    All imaging studies reviewed by me.   5/28/21 CT chest: Unremarkable CT of the chest without contrast. Lungs are clear. No pleural fluid.    PFT's:  Pulmonary Function Testing: personally reviewed.     Most recent PFT interpretation: normal spirometry, normal TLC, but elevated RV and RV/TLC, suggesting some air trapping is possible.           Again, thank you for allowing me to participate in the care of your patient.        Sincerely,        Rush Fraga MD

## 2021-06-08 NOTE — LETTER
6/8/2021      RE: Swetha Patterson  [address]  [facility] [address]       [facility]- General Pulmonary Clinic Follow Up    Assessment and Plan:  Swetha Patterson is a 51 year old female with a history of COPD, adult onset asthma s/p bronchial thermoplasty in 2015, chronic epigastric pain, chronic pain syndrome, depression, anxiety, HTN, obesity, HLD, and pre-diabetes, who returns to clinic today for follow up of COPD and asthma     Probable COPD vs asthma: s/p bronchial thermoplasty in 2015. Frequent exacerbations, though normal spirometry most recently, mild obstruction in the past.  -return to ENT for irritable larynx syndrome and vocal cord issues/hoarsness. Wheeze is upper airway  -repeat Spirometry today  -repeat echo given fluid retention  -needs to work on weight loss and exercise  -Continue Trelegy, should only take daily. Rinse mouth out after use. Refilled  -use albuterol as needed for shortness of breath. If it isn't helping, don't use it! Should not use more than 4-6 times a day, inahler or nebulizer  -Will give flutter valve for mucous clearance today (states she doesn't have this).  -return in 4 months, sooner if needed.   -robitussin DM can be used as needed  -use daily allergy pill, flonase PRN  -needs better reflux control, continue PPI, working with GI. Endoscopy is scheduled.   -referral for sleep study.     Smoking:  -needs to quit smoking, counseled extensively  -needs to get father to quit smoking as well.     Excessive daytime sleepiness: unsure if snores.   -Sleep study referral    49 min spent on care today.     Rush Fraga MD   of Medicine  Division of Pulmonary, Critical Care, Allergy, and Sleep Medicine  ______________________________________________________________  CC: COPD, asthma    HPI:    Swetha Patterson is a 51 year old female with a history of COPD, adult onset asthma s/p bronchial thermoplasty in 2015, chronic  epigastric pain, chronic pain syndrome, depression, anxiety, HTN, obesity, HLD, and pre-diabetes, who returns to clinic today for follow up of COPD and asthma.    Brief summary (from prior visits):   Ms. Patterson established care with Dr. Grimm in July 2020. She had previously seen Dr. Williamson in 2013 for asthma and cough, and had worsening asthma control leading into 2014. Eventually she was taken for bronchial thermoplasty in Feb 2015 by IP. She subsequently had started smoking again, and had frequent exacerbations requiring antibiotics and steroids.     In the past, she was also evaluated by allergy (allergy to dust mites), and ENT for vocal cord nodules/cysts. She has been found to have irritable larynx syndrome, and SLP was recommended. She also has hiatal hernia and esophagitis.     In July 2020, complained of cough and dyspnea on exertion. She was told to use Trelegy inhaler, and aerobika and albuterol nebs BID were prescribed for mucous clearance. Flonase was to be used at night. She was referred to pulmonary rehab. She also needed ENT follow up, and was referred to sleep medicine. She was supposed to follow up in 3 months, but never followed up.     Interval history:   Since last visit, she has continued to have frequent COPD exacerbations. Ms. Patterson states her breathing has not been good. She feels she has been sick for the whole month, with frequent trips to the doctor and ED. She notes her oxygen is worse, the lowest it has been in 92% lately. She also notes more orthopnea. She is sleeping with her head propped up due to this, which makes her breathing better. She also notes worsening vocal change with more crackling present.     She does have a cough occasionally, and will cough up clear thick sputum, that is quite sticky. These tend to occur out of the blue, rather than during a coughing fit. She does have a frequently have a cough, which can also be productive of white mucous or light green mucous. No  hemoptysis. She denies rhinorrhea, nasal congestion. She does have some occasional sore throat. This gets worse with excessive talking. She is having chills and some sweats at times. No particular fevers. She also complains of ongoing nausea, and is having an endoscopy later this month. She doesn't feel her allergies are any worse than usual, and does take an allergy pill daily. She does have some chest pain as well, which typically occur in the front and back, and is tender to palpation. This is most notable after coughing.     She notes she was on prednisone for a whole month due to infections, and just finished a taper. She also did get two different antibiotics during that time- a course of azithromycin and a course of levofloxacin. She did not notice much benefit from the steroids or antibiotics.     She does get short of breath with exertion, typically after 20 min or so, and she needs to stop. Her legs are typically very tired at this point. She has trouble cleaning her whole apartment due to shortness of breath as well.     She is again trying to quit smoking, and trying to get her dad to smoke outside. She is currently using about 6 cigarettes a day. No vaping. She is quite sleepy during the day, but does not know if she snores.     She is gaining weight, about 30 pounds in the last 6-7 months. She feels this is due to prednisone. Appetite is high. Still having acid reflux, is on omeprazole for this. She also complains of frequent LE edema, which worsens throughout the day.     She is currently using albuterol (using nebulizer 3-4 times a day, occasional inhaler use if out and about as well (not every day)). She gets limited benefit- not very long- from her albuterol. She also uses Trelegy, but using twice daily. She is also using another inhaler, but doesn't know the name of it.     Exposure History:  Tobacco: She continues to smoke, now 6 cigs per day. She has smoked for 35 years, always less than 1 ppd.  She is trying to use nicotine gum to quit currently. She has tried Chantix in the past, but it didn't work due to her father smoking as well. Father smokes 2 PPD, mostly in the house.   Other inhaled substances: no vaping. No marijuana. No sandblasting, welding, etc. Notes that air fresheners and cleaning supplies tend to irritate the lungs.    Occupation: On disability since 2010, previously worked as . 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   Pets: cat at home. No birds.   Allergies: dust mite allergies.   Hobbies: walks, shopping.   Travel: no recent travel.   Home: Lives in an apartment with father. No known mold issues or water damage. No feather pillows or down comforters.     ROS: Complete 10 point ROS negative unless mentioned in HPI    Allergies and current medications: Reviewed and updated in EMR.   Past medical, surgical, social, and family histories: Personally reviewed and updated in EMR during this visit.     Physical Exam:  /74   Pulse 84   Resp 18   Wt 108.9 kg (240 lb)   LMP 04/15/2016 (Exact Date)   SpO2 94%   BMI 42.51 kg/m      General: Sitting in the chair in NAD  HEENT: anicteric, mask in place.   Neck: no palpable lymphadenopathy  Lymphatic: no cervical or supraclavicular lymphadenopathy   Chest: mild wheeze over throat noted, lung CTAB. Rare cough.   Cardiac: RRR no murmurs  Abdomen: Soft, obese, non tender, active BS  Extremities: No LE Edema, moving all extremities. No digital clubbing.   Neuro: A&Ox3, no focal defects. Speech/language wnl.   Skin: no rash noted on limited exam  Psych: normal affect.     Labs and Radiology: All new data personally reviewed and summarized below. I have reviewed the results with the patient today.   All laboratory data reviewed   5/22/21 K 3.6 cr 1.01. ALk phos 154, ALT 80, AST 21  WBC 10, Hgb 12.4, plts 349. Abs eos 0.   5/20/21 Quant gold negative  TSH 1.48    All cardiac studies reviewed by me.   6/19/19 NM stress test: 1.  Myocardial  perfusion imaging using single isotope technique demonstrated normal myocardial perfusion. 2. Gated images demonstrated normal wall motion.  The left ventricular systolic function is normal with a calculated ejection fraction of 79%.    All imaging studies reviewed by me.   5/28/21 CT chest: Unremarkable CT of the chest without contrast. Lungs are clear. No pleural fluid.    PFT's:  Pulmonary Function Testing: personally reviewed.     Most recent PFT interpretation: normal spirometry, normal TLC, but elevated RV and RV/TLC, suggesting some air trapping is possible.           Rush Fraga MD

## 2021-06-08 NOTE — NURSING NOTE
Chief Complaint   Patient presents with     RECHECK     Follow up      Medications reviewed and updated.  Vitals taken  Viky Melgoza CMA

## 2021-06-08 NOTE — TELEPHONE ENCOUNTER
Advised Melania of approved orders for RN visits.  She is trying to help patient follow through on tasks and appointments and will encourage her to keep appt next week with Magaly Purcell. SERJIO Chandra R.N.

## 2021-06-08 NOTE — PROGRESS NOTES
[facility]- General Pulmonary Clinic Follow Up    Assessment and Plan:  Swetha Patterson is a 51 year old female with a history of COPD, adult onset asthma s/p bronchial thermoplasty in 2015, chronic epigastric pain, chronic pain syndrome, depression, anxiety, HTN, obesity, HLD, and pre-diabetes, who returns to clinic today for follow up of COPD and asthma     Probable COPD vs asthma: s/p bronchial thermoplasty in 2015. Frequent exacerbations, though normal spirometry most recently, mild obstruction in the past.  -return to ENT for irritable larynx syndrome and vocal cord issues/hoarsness. Wheeze is upper airway  -repeat Spirometry today  -repeat echo given fluid retention  -needs to work on weight loss and exercise  -Continue Trelegy, should only take daily. Rinse mouth out after use. Refilled  -use albuterol as needed for shortness of breath. If it isn't helping, don't use it! Should not use more than 4-6 times a day, inahler or nebulizer  -Will give flutter valve for mucous clearance today (states she doesn't have this).  -return in 4 months, sooner if needed.   -robitussin DM can be used as needed  -use daily allergy pill, flonase PRN  -needs better reflux control, continue PPI, working with GI. Endoscopy is scheduled.   -referral for sleep study.     Smoking:  -needs to quit smoking, counseled extensively  -needs to get father to quit smoking as well.     Excessive daytime sleepiness: unsure if snores.   -Sleep study referral    49 min spent on care today.     Rush Fraga MD   of Medicine  Division of Pulmonary, Critical Care, Allergy, and Sleep Medicine  ______________________________________________________________  CC: COPD, asthma    HPI:    Swetha Patterson is a 51 year old female with a history of COPD, adult onset asthma s/p bronchial thermoplasty in 2015, chronic epigastric pain, chronic pain syndrome, depression, anxiety, HTN, obesity, HLD,  and pre-diabetes, who returns to clinic today for follow up of COPD and asthma.    Brief summary (from prior visits):   Ms. Patterson established care with Dr. Grimm in July 2020. She had previously seen Dr. Williamson in 2013 for asthma and cough, and had worsening asthma control leading into 2014. Eventually she was taken for bronchial thermoplasty in Feb 2015 by IP. She subsequently had started smoking again, and had frequent exacerbations requiring antibiotics and steroids.     In the past, she was also evaluated by allergy (allergy to dust mites), and ENT for vocal cord nodules/cysts. She has been found to have irritable larynx syndrome, and SLP was recommended. She also has hiatal hernia and esophagitis.     In July 2020, complained of cough and dyspnea on exertion. She was told to use Trelegy inhaler, and aerobika and albuterol nebs BID were prescribed for mucous clearance. Flonase was to be used at night. She was referred to pulmonary rehab. She also needed ENT follow up, and was referred to sleep medicine. She was supposed to follow up in 3 months, but never followed up.     Interval history:   Since last visit, she has continued to have frequent COPD exacerbations. Ms. Patterson states her breathing has not been good. She feels she has been sick for the whole month, with frequent trips to the doctor and ED. She notes her oxygen is worse, the lowest it has been in 92% lately. She also notes more orthopnea. She is sleeping with her head propped up due to this, which makes her breathing better. She also notes worsening vocal change with more crackling present.     She does have a cough occasionally, and will cough up clear thick sputum, that is quite sticky. These tend to occur out of the blue, rather than during a coughing fit. She does have a frequently have a cough, which can also be productive of white mucous or light green mucous. No hemoptysis. She denies rhinorrhea, nasal congestion. She does have some occasional  sore throat. This gets worse with excessive talking. She is having chills and some sweats at times. No particular fevers. She also complains of ongoing nausea, and is having an endoscopy later this month. She doesn't feel her allergies are any worse than usual, and does take an allergy pill daily. She does have some chest pain as well, which typically occur in the front and back, and is tender to palpation. This is most notable after coughing.     She notes she was on prednisone for a whole month due to infections, and just finished a taper. She also did get two different antibiotics during that time- a course of azithromycin and a course of levofloxacin. She did not notice much benefit from the steroids or antibiotics.     She does get short of breath with exertion, typically after 20 min or so, and she needs to stop. Her legs are typically very tired at this point. She has trouble cleaning her whole apartment due to shortness of breath as well.     She is again trying to quit smoking, and trying to get her dad to smoke outside. She is currently using about 6 cigarettes a day. No vaping. She is quite sleepy during the day, but does not know if she snores.     She is gaining weight, about 30 pounds in the last 6-7 months. She feels this is due to prednisone. Appetite is high. Still having acid reflux, is on omeprazole for this. She also complains of frequent LE edema, which worsens throughout the day.     She is currently using albuterol (using nebulizer 3-4 times a day, occasional inhaler use if out and about as well (not every day)). She gets limited benefit- not very long- from her albuterol. She also uses Trelegy, but using twice daily. She is also using another inhaler, but doesn't know the name of it.     Exposure History:  Tobacco: She continues to smoke, now 6 cigs per day. She has smoked for 35 years, always less than 1 ppd. She is trying to use nicotine gum to quit currently. She has tried Chantix in the  past, but it didn't work due to her father smoking as well. Father smokes 2 PPD, mostly in the house.   Other inhaled substances: no vaping. No marijuana. No sandblasting, welding, etc. Notes that air fresheners and cleaning supplies tend to irritate the lungs.    Occupation: On disability since 2010, previously worked as . 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   Pets: cat at home. No birds.   Allergies: dust mite allergies.   Hobbies: walks, shopping.   Travel: no recent travel.   Home: Lives in an apartment with father. No known mold issues or water damage. No feather pillows or down comforters.     ROS: Complete 10 point ROS negative unless mentioned in HPI    Allergies and current medications: Reviewed and updated in EMR.   Past medical, surgical, social, and family histories: Personally reviewed and updated in EMR during this visit.     Physical Exam:  /74   Pulse 84   Resp 18   Wt 108.9 kg (240 lb)   LMP 04/15/2016 (Exact Date)   SpO2 94%   BMI 42.51 kg/m      General: Sitting in the chair in NAD  HEENT: anicteric, mask in place.   Neck: no palpable lymphadenopathy  Lymphatic: no cervical or supraclavicular lymphadenopathy   Chest: mild wheeze over throat noted, lung CTAB. Rare cough.   Cardiac: RRR no murmurs  Abdomen: Soft, obese, non tender, active BS  Extremities: No LE Edema, moving all extremities. No digital clubbing.   Neuro: A&Ox3, no focal defects. Speech/language wnl.   Skin: no rash noted on limited exam  Psych: normal affect.     Labs and Radiology: All new data personally reviewed and summarized below. I have reviewed the results with the patient today.   All laboratory data reviewed   5/22/21 K 3.6 cr 1.01. ALk phos 154, ALT 80, AST 21  WBC 10, Hgb 12.4, plts 349. Abs eos 0.   5/20/21 Quant gold negative  TSH 1.48    All cardiac studies reviewed by me.   6/19/19 NM stress test: 1.  Myocardial perfusion imaging using single isotope technique demonstrated normal myocardial  perfusion. 2. Gated images demonstrated normal wall motion.  The left ventricular systolic function is normal with a calculated ejection fraction of 79%.    All imaging studies reviewed by me.   5/28/21 CT chest: Unremarkable CT of the chest without contrast. Lungs are clear. No pleural fluid.    PFT's:  Pulmonary Function Testing: personally reviewed.     Most recent PFT interpretation: normal spirometry, normal TLC, but elevated RV and RV/TLC, suggesting some air trapping is possible.

## 2021-06-08 NOTE — PATIENT INSTRUCTIONS
Asthma/COPD:   -return to ENT for irritable larynx syndrome and vocal cord issues/hoarsness. Wheeze is upper airway, which they can help with  -repeat Spirometry today  -repeat echocardiogram given fluid retention  -need to work on weight loss and exercise  -Continue Trelegy, should only take daily. Rinse mouth out after use  -use albuterol as needed for shortness of breath. If it isn't helping, don't use it! Should not use more than 4-6 times a day, inahler or nebulizer  -I am giving you a flutter valve for mucous clearance   -robitussin DM can be used as needed for cough suppression at night.   -use daily allergy pill, flonase nasal spray as needed.   -need better reflux control, continue opmeprazole, work with GI  -sleep study referral     Smoking:  -need to quit smoking! Will help!  -needs to get father to quit smoking as well.     Excessive daytime sleepiness: -Sleep study referral

## 2021-06-14 DIAGNOSIS — Z11.59 ENCOUNTER FOR SCREENING FOR OTHER VIRAL DISEASES: ICD-10-CM

## 2021-06-14 LAB
SARS-COV-2 RNA RESP QL NAA+PROBE: NORMAL
SPECIMEN SOURCE: NORMAL

## 2021-06-14 PROCEDURE — U0003 INFECTIOUS AGENT DETECTION BY NUCLEIC ACID (DNA OR RNA); SEVERE ACUTE RESPIRATORY SYNDROME CORONAVIRUS 2 (SARS-COV-2) (CORONAVIRUS DISEASE [COVID-19]), AMPLIFIED PROBE TECHNIQUE, MAKING USE OF HIGH THROUGHPUT TECHNOLOGIES AS DESCRIBED BY CMS-2020-01-R: HCPCS | Performed by: SPECIALIST

## 2021-06-14 PROCEDURE — U0005 INFEC AGEN DETEC AMPLI PROBE: HCPCS | Performed by: SPECIALIST

## 2021-06-15 ENCOUNTER — TELEPHONE (OUTPATIENT)
Dept: INTERNAL MEDICINE | Facility: CLINIC | Age: 51
End: 2021-06-15

## 2021-06-15 ENCOUNTER — OFFICE VISIT (OUTPATIENT)
Dept: INTERNAL MEDICINE | Facility: CLINIC | Age: 51
End: 2021-06-15
Payer: COMMERCIAL

## 2021-06-15 VITALS
SYSTOLIC BLOOD PRESSURE: 110 MMHG | RESPIRATION RATE: 18 BRPM | DIASTOLIC BLOOD PRESSURE: 67 MMHG | HEIGHT: 63 IN | OXYGEN SATURATION: 98 % | HEART RATE: 99 BPM | BODY MASS INDEX: 42.75 KG/M2 | WEIGHT: 241.3 LBS | TEMPERATURE: 98.6 F

## 2021-06-15 DIAGNOSIS — E66.01 MORBID OBESITY (H): ICD-10-CM

## 2021-06-15 DIAGNOSIS — Z98.1 STATUS POST CERVICAL SPINAL FUSION: ICD-10-CM

## 2021-06-15 DIAGNOSIS — J44.9 COPD, MODERATE (H): ICD-10-CM

## 2021-06-15 DIAGNOSIS — K21.9 GASTROESOPHAGEAL REFLUX DISEASE WITHOUT ESOPHAGITIS: Chronic | ICD-10-CM

## 2021-06-15 DIAGNOSIS — I10 HTN, GOAL BELOW 140/90: ICD-10-CM

## 2021-06-15 DIAGNOSIS — Z01.818 PREOP GENERAL PHYSICAL EXAM: Primary | ICD-10-CM

## 2021-06-15 DIAGNOSIS — F41.9 ANXIETY: Chronic | ICD-10-CM

## 2021-06-15 PROCEDURE — 99214 OFFICE O/P EST MOD 30 MIN: CPT | Performed by: NURSE PRACTITIONER

## 2021-06-15 RX ORDER — LAMOTRIGINE 150 MG/1
150 TABLET ORAL 2 TIMES DAILY
COMMUNITY
Start: 2021-05-29 | End: 2024-08-15

## 2021-06-15 ASSESSMENT — MIFFLIN-ST. JEOR: SCORE: 1673.66

## 2021-06-15 NOTE — PATIENT INSTRUCTIONS
Morning of procedure DO NOT TAKE   Metformin  Glipizide   Lisinopril   Lasix     Morning of surgery may take   Wellbutrin   Cymbalta   Hydroxyzine   Lamictal   Bactrim   Prilosec  trelligy   Albuterol

## 2021-06-15 NOTE — H&P (VIEW-ONLY)
[place] [address]  [facility] [address]  Phone: [phone]  Primary Provider: Roro Chawla  Pre-op Performing Provider: CELY FIGUEROA      PREOPERATIVE EVALUATION:  Today's date: 6/15/2021    Swetha Patterson is a 51 year old female who presents for a preoperative evaluation.    Surgical Information:  Surgery/Procedure: ESOPHAGOGASTRODUODENOSCOPY (EGD  Surgery Location:  GI  Surgeon: Darrel Damon MD  Surgery Date: 06/17/2021  Time of Surgery: 12:45  Where patient plans to recover: At home with family  Fax number for surgical facility:     Type of Anesthesia Anticipated: to be determined    Assessment & Plan     The proposed surgical procedure is considered INTERMEDIATE risk.    Preop general physical exam      Gastroesophageal reflux disease without esophagitis  Needs relief and assessment     Morbid obesity (H)  Discussed     COPD, moderate (H)  Stable     HTN, goal below 140/90  In good range     Status post cervical spinal fusion  Has issues with screw causing her swallowing issues and she needs to see surgeon re this     Anxiety  Stable            Risks and Recommendations:  The patient has the following additional risks and recommendations for perioperative complications:   - Morbid obesity (BMI >40)  Diabetes:  - Patient is not on insulin therapy: regular NPO guidelines can be followed.      She has a screw impacting her esophagus and needs to see spine surgeon to discuss     Medication Instructions:  Patient is to take all scheduled medications on the day of surgery EXCEPT for modifications listed below:    RECOMMENDATION:  APPROVAL GIVEN to proceed with proposed procedure, without further diagnostic evaluation.              21 minutes spent on the date of the encounter doing chart review, history and exam, documentation and further activities per the note        Subjective     HPI related to upcoming procedure: endoscopy - done in  OR   Needs to be fully sedated as she tends to pull out her lines and tubes when she is consciously sedated  and has COPD     Preop Questions 6/15/2021   1. Have you ever had a heart attack or stroke? No   2. Have you ever had surgery on your heart or blood vessels, such as a stent placement, a coronary artery bypass, or surgery on an artery in your head, neck, heart, or legs? No   3. Do you have chest pain with activity? YES - she is having stress test after surgery   Normal EKG 5/2021  Had lexiscan 2019 that was ok    4. Do you have a history of  heart failure? No   5. Do you currently have a cold, bronchitis or symptoms of other infection? No   6. Do you have a cough, shortness of breath, or wheezing? No   7. Do you or anyone in your family have previous history of blood clots? No   8. Do you or does anyone in your family have a serious bleeding problem such as prolonged bleeding following surgeries or cuts? No   9. Have you ever had problems with anemia or been told to take iron pills? No   10. Have you had any abnormal blood loss such as black, tarry or bloody stools, or abnormal vaginal bleeding? YES - rectal bleeding in past month - dark color    11. Have you ever had a blood transfusion? No   12. Are you willing to have a blood transfusion if it is medically needed before, during, or after your surgery? Yes   13. Have you or any of your relatives ever had problems with anesthesia? No   14. Do you have sleep apnea, excessive snoring or daytime drowsiness? No   15. Do you have any artifical heart valves or other implanted medical devices like a pacemaker, defibrillator, or continuous glucose monitor? No   16. Do you have artificial joints? No   17. Are you allergic to latex? No   18. Is there any chance that you may be pregnant? No   Fusion in C5-6 cervical     Health Care Directive:  Patient does not have a Health Care Directive or Living Will: Discussed advance care planning with patient; however, patient  declined at this time.    Preoperative Review of :   reviewed - controlled substances reflected in medication list.      Status of Chronic Conditions:  COPD - Patient has a longstanding history of moderate-severe COPD . Patient has been doing well overall noting SOB and continues on medication regimen consisting of inhaler  without adverse reactions or side effects.    DIABETES - Patient has a longstanding history of DiabetesType Type II . Patient is being treated with oral agents and denies significant side effects. Control has been good.   Lab Results   Component Value Date    A1C 5.6 10/16/2020    A1C 6.0 02/20/2020    A1C 5.6 05/10/2019    A1C 5.4 03/21/2013         HYPERTENSION - Patient has longstanding history of HTN , currently denies any symptoms referable to elevated blood pressure. Specifically denies chest pain, palpitations, dyspnea, orthopnea, PND or peripheral edema. Blood pressure readings have been in normal range. Current medication regimen is as listed below. Patient denies any side effects of medication.       Review of Systems  Constitutional, neuro, ENT, endocrine, pulmonary, cardiac, gastrointestinal, genitourinary, musculoskeletal, integument and psychiatric systems are negative, except as otherwise noted.    Patient Active Problem List    Diagnosis Date Noted     Obstructive chronic bronchitis with exacerbation (H) 09/09/2020     Priority: Medium     Personal history of tobacco use, presenting hazards to health 09/09/2020     Priority: Medium     Morbid obesity (H) 08/31/2020     Priority: Medium     Claudication of both lower extremities (H) 03/06/2020     Priority: Medium     PAD (peripheral artery disease) (H) 03/06/2020     Priority: Medium     Jaw claudication 03/06/2020     Priority: Medium     Added automatically from request for surgery [id number]       History of opioid abuse (H) Rx was stopped when she failed urine drug test 12/13/2019     Priority: Medium     HTN, goal below  "140/90 01/12/2019     Priority: Medium     Vocal cord polyp 06/28/2018     Priority: Medium     Status post cervical spinal fusion 05/01/2018     Priority: Medium     Intentional drug overdose (H) 02/08/2018     Priority: Medium     Cervical HNP C5-6 11/09/2017     Priority: Medium     DIAZ (nonalcoholic steatohepatitis) 06/17/2016     Priority: Medium     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  Doesn't drink, but relapsed (doesn't remember).  Was on Valium for 4 years (until October 2017). Xanax since 11/2017.  Managed by hospital.        Past Medical History:   Diagnosis Date     Anxiety state, unspecified      Asthma      Chronic pain     back pain from cyst     Contact dermatitis and other eczema, due to unspecified cause      COPD (chronic obstructive pulmonary disease) (H)      Depressive disorder, not elsewhere classified      Diabetes (H)      Emphysema with chronic bronchitis (H)      Esophageal reflux      Family history of ischemic heart disease      Fibromyalgia      Gastro-oesophageal reflux disease      History of emphysema      Hoarseness      HTN, goal below 140/90      Hyperlipidemia LDL goal <130      Liver disease     \"fatty liver\"     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)      PONV (postoperative nausea and vomiting)      Rectal bleeding      Past Surgical History:   Procedure Laterality Date     ARTHROSCOPY SHOULDER DECOMPRESSION Left 10/21/2015    Procedure: ARTHROSCOPY " SHOULDER DECOMPRESSION;  Surgeon: Julien Milian MD;  Location: [facility] OR     BIOPSY ARTERY TEMPORAL Left 3/11/2020    Procedure: LEFT TEMPORAL ARTERY BIOPSY;  Surgeon: Ibeth Conner MD;  Location: [facility] OR     BRONCHIAL THERMOPLASTY N/A 11/14/2014    Procedure: BRONCHIAL THERMOPLASTY;  Surgeon: Ward Whitaker MD;  Location: [facility]     BRONCHIAL THERMOPLASTY N/A 12/19/2014    Procedure: BRONCHIAL THERMOPLASTY;  Surgeon: Ward Whitaker MD;  [facility]: [facility]     BRONCHIAL THERMOPLASTY N/A 2/6/2015    Procedure: BRONCHIAL THERMOPLASTY;  Surgeon: Ward Whitaker MD;  Location: [facility]     C APPENDECTOMY  at age 17     COLONOSCOPY N/A 11/26/2018    Procedure: COLONOSCOPY ([facility]);  Surgeon: Marshall Oakes MD;  Location: [facility] OR     COLONOSCOPY N/A 10/22/2020    Procedure: Colonoscopy;  Surgeon: Marshall Mccormick MD;  Location: [facility] OR     DISCECTOMY, FUSION CERVICAL ANTERIOR ONE LEVEL, COMBINED N/A 5/1/2018    Procedure: COMBINED DISCECTOMY, FUSION CERVICAL ANTERIOR ONE LEVEL;  1.  C5-C6 anterior cervical diskectomy and fusion.    2.  C5-C6 application of intervertebral biomechanical device for interbody fusion purposes.    3.  C5-C6 anterior instrumentation using the standard Kristin InViZia 24 mm plate with four associated bone screws, 12 mm in length.  Inferior screws are fixed.  Superior screws are va     ENT SURGERY  2015    polyps removed from vocal cords      ESOPHAGOSCOPY, GASTROSCOPY, DUODENOSCOPY (EGD), COMBINED N/A 10/22/2020    Procedure: Esophagoscopy, gastroscopy, duodenoscopy with biopsies;  Surgeon: Marshall Mccormick MD;  Location: [facility] OR     EXCISE NODE MEDIASTINAL  4/26/2013    Procedure: EXCISE NODE MEDIASTINAL;;  Surgeon: Av Peña MD;  Location:  OR     LAPAROSCOPIC CHOLECYSTECTOMY N/A 10/19/2017    Procedure: LAPAROSCOPIC CHOLECYSTECTOMY;  LAPAROSCOPIC CHOLECYSTECTOMY;  Surgeon: Ney Jerry MD;  Location:  OR     THORACOSCOPY  4/26/2013    Procedure:  THORACOSCOPY;  LEFT VIDEO ASSISTED THORACOSCOPY, RESECTION OF POSTERIOR MEDIASTINAL MASS;  Surgeon: Av Peña MD;  Location:  OR     TONSILLECTOMY  as a kid     TONSILLECTOMY       Current Outpatient Medications   Medication Sig Dispense Refill     acetaminophen (TYLENOL) 325 MG tablet Take 3 tablets (975 mg) by mouth every 6 hours as needed for pain 50 tablet 0     albuterol (PROVENTIL) (2.5 MG/3ML) 0.083% neb solution Take 1 vial (2.5 mg) by nebulization every 6 hours as needed for shortness of breath / dyspnea or wheezing 25 vial 3     atorvastatin (LIPITOR) 80 MG tablet TAKE ONE TABLET BY MOUTH EVERY DAY 90 tablet 0     benzoyl peroxide (ACNE-CLEAR) 10 % external gel Apply topically 2 times daily as needed       blood glucose (ACCU-CHEK ALLYSON PLUS) test strip USE TO TEST BLOOD SUGAR ONE TIME DAILY OR AS DIRECTED 100 each 0     blood glucose (NO BRAND SPECIFIED) lancets standard Use to test blood sugar 1 times daily or as directed. 100 each 3     blood glucose (NO BRAND SPECIFIED) lancets standard Use to test blood sugar 1 time daily 100 each 1     blood glucose (NO BRAND SPECIFIED) test strip Use to test blood sugar 1 times daily or as directed.  Dispense Accu-chek Allyson Plus or per patient insurance 100 strip 3     blood glucose monitoring (NO BRAND SPECIFIED) meter device kit Use to test blood sugar 1 times daily or as directed.  Dispense Accu-chek Allyson Plus or per insurance preference 1 kit 0     blood glucose monitoring (NO BRAND SPECIFIED) meter device kit Use to test blood sugar 1 times daily or as directed. 1 kit 0     buPROPion (WELLBUTRIN XL) 150 MG 24 hr tablet TAKE ONE TABLET BY MOUTH EVERY MORNING 90 tablet 2     cetirizine HCl 10 MG CAPS Take 1 capsule (10 mg) by mouth daily as needed Takes in the spring. 90 capsule 3     clindamycin 1 % EX external gel Apply topically 2 times daily 60 g 3     clonazePAM (KLONOPIN) 1 MG tablet Take 1 tablet (1 mg) by mouth 2 times daily as needed  for anxiety She needs to see her PCP to get more tablets 5 tablet 0     cyclobenzaprine (FLEXERIL) 5 MG tablet Take 1 tablet (5 mg) by mouth 3 times daily as needed for muscle spasms 30 tablet 1     desonide (DESOWEN) 0.05 % external cream Apply topically daily       DULoxetine (CYMBALTA) 20 MG capsule Take 1 capsule (20 mg) by mouth daily X 7 days and if well tolerated increase to 40 mg daily. 53 capsule 1     fluticasone (FLONASE) 50 MCG/ACT nasal spray Spray 2 sprays in nostril daily 16 g 5     Fluticasone-Umeclidin-Vilanterol (TRELEGY ELLIPTA) 100-62.5-25 MCG/INH oral inhaler Inhale 1 puff into the lungs daily 28 each 3     furosemide (LASIX) 20 MG tablet TAKE ONE TABLET BY MOUTH EVERY DAY 90 tablet 0     glipiZIDE (GLUCOTROL XL) 2.5 MG 24 hr tablet TAKE ONE TABLET BY MOUTH EVERY DAY 90 tablet 1     hydrocortisone 2.5 % cream APPLY TOPICALLY TO THE AFFECTED AREA(S) TWO TIMES A DAY 30 g 1     hydrOXYzine (ATARAX) 50 MG tablet TAKE TWO TABLETS BY MOUTH THREE TIMES A DAY AS NEEDED FOR ANXIETY MAX 70 TABLETS PER WEEK 70 tablet 5     lamoTRIgine (LAMICTAL) 100 MG tablet 100 mg daily        lamoTRIgine (LAMICTAL) 150 MG tablet Take 150 mg by mouth daily       lisinopril (ZESTRIL) 20 MG tablet TAKE ONE TABLET BY MOUTH EVERY DAY 90 tablet 0     metFORMIN (GLUCOPHAGE) 500 MG tablet TAKE ONE TABLET BY MOUTH EVERY DAY WITH BREAKFAST 90 tablet 1     methocarbamol (ROBAXIN) 500 MG tablet Take 1 tablet (500 mg) by mouth 3 times daily as needed for muscle spasms 120 tablet 0     montelukast (SINGULAIR) 10 MG tablet Take 1 tablet (10 mg) by mouth At Bedtime 90 tablet 4     nicotine (NICORETTE) 2 MG gum Place 1 each (2 mg) inside cheek as needed for smoking cessation 150 each 1     nitroGLYcerin (NITROSTAT) 0.4 MG sublingual tablet PLACE ONE TABLET UNDER THE TONGUE AT THE 1ST SIGN OF ATTACK. IF PAIN IS UNRELIEVED OR WORSENED 5 MINUTES AFTER 1ST DOSE, PROMPT MEDICAL ASSISTANCE IS NEEDED. MAY REPEAT EVERY 5 MINUTES UNTIL PAIN IS  RELIEVED. MAX OF THREE DOSES 25 tablet 3     nystatin (MYCOSTATIN) 880986 UNIT/GM external powder APPLY TOPICALLY TWO TIMES A DAY AS NEEDED FOR SKIN RASH 45 g 0     omeprazole (PRILOSEC) 20 MG DR capsule TAKE ONE CAPSULE BY MOUTH TWO TIMES A DAY       omeprazole 20 MG tablet TAKE ONE TABLET BY MOUTH TWICE A DAY 60 tablet 0     ondansetron (ZOFRAN ODT) 4 MG ODT tab Take 1 tablet (4 mg) by mouth every 6 hours as needed for nausea or vomiting 12 tablet 0     ondansetron (ZOFRAN) 8 MG tablet TAKE ONE-HALF TO ONE TABLET BY MOUTH EVERY 8 HOURS AS NEEDED FOR NAUSEA 30 tablet 1     order for DME Please provide oximeter 1 Device 0     order for DME Equipment being ordered: Digital home blood pressure monitor kit 1 Device 0     order for DME Equipment being ordered: Pulse Oxymeter 1 Device 0     polyethylene glycol (MIRALAX) 17 GM/Dose powder Take 17 g (1 capful) by mouth daily 507 g 1     predniSONE (DELTASONE) 10 MG tablet -- take 2 tablets twice a day for 3 days then -- take 2 tablets daily for 10 days, -- then take 1 tab daily for 10 days 42 tablet 0     prochlorperazine (COMPAZINE) 10 MG tablet Take 1 tablet (10 mg) by mouth every 8 hours as needed for nausea or vomiting 30 tablet 0     sucralfate (CARAFATE) 1 GM tablet Take 1 tablet (1 g) by mouth 4 times daily 120 tablet 1     sucralfate (CARAFATE) 1 GM/10ML suspension Take 10 mLs (1 g) by mouth 4 times daily as needed for nausea (Pain) 420 mL 0     sulfamethoxazole-trimethoprim (BACTRIM DS) 800-160 MG tablet Take 1 tablet by mouth 2 times daily Take one tablet twice daily. For additional refills, please schedule a follow-up appointment. 180 tablet 1     terbinafine (LAMISIL) 1 % external cream Apply topically 2 times daily 30 g 2     valACYclovir (VALTREX) 1000 mg tablet Take 2 tablets (2,000 mg) by mouth 2 times daily for 1 day 4 tablet 0     VENTOLIN  (90 Base) MCG/ACT inhaler INHALE TWO PUFFS BY MOUTH EVERY 6 HOURS AS NEEDED FOR SHORTNESS OF BREATH 18 g 1        Allergies   Allergen Reactions     Codeine Nausea and Vomiting     vomiting     Cyclobenzaprine Nausea     Hydrocodone Nausea and Vomiting     Sulfa Drugs Nausea and Vomiting     Nausea     Gabapentin Rash        Social History     Tobacco Use     Smoking status: Current Every Day Smoker     Packs/day: 0.50     Years: 30.00     Pack years: 15.00     Types: Cigarettes     Start date: 1/1/1985     Smokeless tobacco: Never Used     Tobacco comment: Is trying to quit, using nicotine gum and patch   Substance Use Topics     Alcohol use: No     Alcohol/week: 0.0 standard drinks     Family History   Problem Relation Age of Onset     Heart Disease Mother         murmur, mi     Hypertension Mother      Cerebrovascular Disease Mother      Depression Mother      Gallbladder Disease Mother      Asthma Mother      Family History Negative Father         does not know  him     Family History Negative Brother      Heart Disease Maternal Grandfather      Asthma Maternal Grandfather      Hypertension Maternal Grandfather      Cerebrovascular Disease Maternal Grandfather      Diabetes Maternal Grandfather      Asthma Sister      Hypertension Sister      Cerebrovascular Disease Sister      Hypertension Maternal Grandmother      Cerebrovascular Disease Maternal Grandmother      History   Drug Use No         Objective     LMP 04/15/2016 (Exact Date)     Physical Exam    GENERAL APPEARANCE: alert and no distress     HENT: ear canals and TM's normal and nose and mouth without ulcers or lesions     RESP: lungs clear to auscultation - no rales, rhonchi or wheezes     CV: regular rates and rhythm, normal S1 S2, no S3 or S4 and no murmur, click or rub     ABDOMEN:  soft, obese nontender,  and bowel sounds normal     MS: extremities normal- no gross deformities noted, no evidence of inflammation in joints, FROM in all extremities.     SKIN: no suspicious lesions or rashes     NEURO: Normal strength and tone, sensory exam grossly normal,  mentation intact and speech normal     PSYCH: mentation appears normal. and affect normal/bright    Recent Labs   Lab Test 05/22/21  0151 05/12/21  1733 11/20/20  0622 11/20/20  0622 10/16/20  1507 09/29/20  1527 02/20/20  0920 02/20/20  0920   HGB 12.4 13.8   < > 11.6* 12.6 12.3   < > 14.5    387   < > 374  --  351   < > 386   INR  --   --   --  0.99  --  0.94  --   --     136   < > 136  --  137   < > 141   POTASSIUM 3.6 3.8   < > 4.3 4.8 4.6   < > 4.3   CR 1.01 0.98   < > 0.91  --  1.07*   < > 0.71   A1C  --   --   --   --  5.6  --   --  6.0*    < > = values in this interval not displayed.        Diagnostics:  Recent Results (from the past 720 hour(s))   Lactate Dehydrogenase    Collection Time: 05/20/21 11:25 AM   Result Value Ref Range    Lactate Dehydrogenase 168 81 - 234 U/L   TSH with free T4 reflex    Collection Time: 05/20/21 11:25 AM   Result Value Ref Range    TSH 1.48 0.40 - 4.00 mU/L   Quantiferon TB Gold Plus    Collection Time: 05/20/21 11:25 AM    Specimen: Blood   Result Value Ref Range    MTB Quantiferon Result Negative NEG^Negative    TB1 Ag minus Nil 0.03 IU/mL    TB2 Ag minus Nil 0.04 IU/mL    Mitogen minus Nil 10.00 IU/mL    NIL Result 0.00 IU/mL   Catecholamines fractioned free urine    Collection Time: 05/20/21 11:34 AM   Result Value Ref Range    Hours Collected RANDOM       Total Urine Volume 30       Urine Dopamine/Creatinine 72 0 - 250 ug/g CRT    Urine Dopamine Not Applicable 71 - 485 ug/d    Dopamine per Volume Urine 59 ug/L    Urine Norepineph/Creatinine 54 (H) 0 - 45 ug/g CRT    Urine Norepinephrine Not Applicable 14 - 120 ug/d    Norepinephrine per Volume Urine 44 ug/L    Urine Epinephrine/Creatinine 4 0 - 20 ug/g CRT    Urine Epinephrine Not Applicable 1 - 14    Epinephrine per Volume Urine 3 ug/L    Catecholamines Fract Urine Free Interp SEE NOTE     Creatinine Urine/Volume 82 mg/dL    Creatinine Urine/24hr Not Applicable 500 - 1400 mg/d   5-HIAA quantitative urine     Collection Time: 05/20/21 11:34 AM   Result Value Ref Range    5-HIAA Duration Urine Random hr    Volume 30 mL    5 HIAA 2.9 mg/L    HIAA-24 h Not Applicable 0 - 15 mg/d    HIAA-5 Creatinine Ratio 4 0 - 14 mg/gCR    Creatinine For 5-HIAA 24 H/Random Urine 82 mg/dL    5-HIAA Creatinine Quant Urine Not Applicable 500 - 1400 mg/d    5-HIAA 24 Hr/Random Urine Interpretation SEE NOTE    Comprehensive metabolic panel    Collection Time: 05/22/21  1:51 AM   Result Value Ref Range    Sodium 142 133 - 144 mmol/L    Potassium 3.6 3.4 - 5.3 mmol/L    Chloride 108 94 - 109 mmol/L    Carbon Dioxide 26 20 - 32 mmol/L    Anion Gap 8 3 - 14 mmol/L    Glucose 75 70 - 99 mg/dL    Urea Nitrogen 14 7 - 30 mg/dL    Creatinine 1.01 0.52 - 1.04 mg/dL    GFR Estimate 64 >60 mL/min/[1.73_m2]    GFR Estimate If Black 74 >60 mL/min/[1.73_m2]    Calcium 8.9 8.5 - 10.1 mg/dL    Bilirubin Total 0.2 0.2 - 1.3 mg/dL    Albumin 3.7 3.4 - 5.0 g/dL    Protein Total 7.4 6.8 - 8.8 g/dL    Alkaline Phosphatase 154 (H) 40 - 150 U/L    ALT 80 (H) 0 - 50 U/L    AST 21 0 - 45 U/L   Lipase    Collection Time: 05/22/21  1:51 AM   Result Value Ref Range    Lipase 101 73 - 393 U/L   CBC with platelets differential    Collection Time: 05/22/21  1:51 AM   Result Value Ref Range    WBC 10.0 4.0 - 11.0 10e9/L    RBC Count 4.70 3.8 - 5.2 10e12/L    Hemoglobin 12.4 11.7 - 15.7 g/dL    Hematocrit 38.9 35.0 - 47.0 %    MCV 83 78 - 100 fl    MCH 26.4 (L) 26.5 - 33.0 pg    MCHC 31.9 31.5 - 36.5 g/dL    RDW 16.8 (H) 10.0 - 15.0 %    Platelet Count 349 150 - 450 10e9/L    Diff Method Automated Method     % Neutrophils 46.4 %    % Lymphocytes 47.5 %    % Monocytes 4.9 %    % Eosinophils 0.0 %    % Basophils 0.3 %    % Immature Granulocytes 0.9 %    Nucleated RBCs 0 0 /100    Absolute Neutrophil 4.7 1.6 - 8.3 10e9/L    Absolute Lymphocytes 4.8 0.8 - 5.3 10e9/L    Absolute Monocytes 0.5 0.0 - 1.3 10e9/L    Absolute Eosinophils 0.0 0.0 - 0.7 10e9/L    Absolute Basophils 0.0  0.0 - 0.2 10e9/L    Abs Immature Granulocytes 0.1 0 - 0.4 10e9/L    Absolute Nucleated RBC 0.0    Alcohol ethyl    Collection Time: 05/22/21  1:51 AM   Result Value Ref Range    Ethanol g/dL 0.17 (H) <0.01 g/dL   EYE EXAM - HIM SCAN    Collection Time: 05/27/21 12:00 AM   Result Value Ref Range    RETINOPATHY NEGATIVE    Asymptomatic COVID-19 Virus (Coronavirus) by PCR    Collection Time: 06/14/21 11:06 AM    Specimen: Nasopharyngeal   Result Value Ref Range    COVID-19 Virus PCR to [facility] - Source Nasopharyngeal     COVID-19 Virus PCR to [facility] - Result       Test received-See reflex to IDDL test SARS CoV2 (COVID-19) Virus RT-PCR   SARS-CoV-2 COVID-19 Virus (Coronavirus) by PCR    Collection Time: 06/14/21 11:06 AM    Specimen: Nasopharyngeal   Result Value Ref Range    SARS-CoV-2 Virus Specimen Source Nasopharyngeal     SARS-CoV-2 PCR Result NEGATIVE     SARS-CoV-2 PCR Comment       Testing was performed using the pia SARS-CoV-2 assay on the pia 6800 System.2      EKG: appears normal, NSR, 5/2021    Revised Cardiac Risk Index (RCRI):  The patient has the following serious cardiovascular risks for perioperative complications:   - No serious cardiac risks = 0 points     RCRI Interpretation: 2 points: Class III (moderate risk - 6.6% complication rate)     Estimated Functional Capacity: Performs 4 METS exercise without symptoms (e.g., light housework, stairs, 4 mph walk, 7 mph bike, slow step dance)           Signed Electronically by: LEIGH Long CNP  Copy of this evaluation report is provided to requesting physician.

## 2021-06-15 NOTE — PROGRESS NOTES
[place] [address]  [facility] [address]  Phone: [phone]  Primary Provider: Roro Chawla  Pre-op Performing Provider: CELY FIGUEROA      PREOPERATIVE EVALUATION:  Today's date: 6/15/2021    Swetha Patterson is a 51 year old female who presents for a preoperative evaluation.    Surgical Information:  Surgery/Procedure: ESOPHAGOGASTRODUODENOSCOPY (EGD  Surgery Location:  GI  Surgeon: Darrel Damon MD  Surgery Date: 06/17/2021  Time of Surgery: 12:45  Where patient plans to recover: At home with family  Fax number for surgical facility:     Type of Anesthesia Anticipated: to be determined    Assessment & Plan     The proposed surgical procedure is considered INTERMEDIATE risk.    Preop general physical exam      Gastroesophageal reflux disease without esophagitis  Needs relief and assessment     Morbid obesity (H)  Discussed     COPD, moderate (H)  Stable     HTN, goal below 140/90  In good range     Status post cervical spinal fusion  Has issues with screw causing her swallowing issues and she needs to see surgeon re this     Anxiety  Stable            Risks and Recommendations:  The patient has the following additional risks and recommendations for perioperative complications:   - Morbid obesity (BMI >40)  Diabetes:  - Patient is not on insulin therapy: regular NPO guidelines can be followed.      She has a screw impacting her esophagus and needs to see spine surgeon to discuss     Medication Instructions:  Patient is to take all scheduled medications on the day of surgery EXCEPT for modifications listed below:    RECOMMENDATION:  APPROVAL GIVEN to proceed with proposed procedure, without further diagnostic evaluation.              21 minutes spent on the date of the encounter doing chart review, history and exam, documentation and further activities per the note        Subjective     HPI related to upcoming procedure: endoscopy - done in  OR   Needs to be fully sedated as she tends to pull out her lines and tubes when she is consciously sedated  and has COPD     Preop Questions 6/15/2021   1. Have you ever had a heart attack or stroke? No   2. Have you ever had surgery on your heart or blood vessels, such as a stent placement, a coronary artery bypass, or surgery on an artery in your head, neck, heart, or legs? No   3. Do you have chest pain with activity? YES - she is having stress test after surgery   Normal EKG 5/2021  Had lexiscan 2019 that was ok    4. Do you have a history of  heart failure? No   5. Do you currently have a cold, bronchitis or symptoms of other infection? No   6. Do you have a cough, shortness of breath, or wheezing? No   7. Do you or anyone in your family have previous history of blood clots? No   8. Do you or does anyone in your family have a serious bleeding problem such as prolonged bleeding following surgeries or cuts? No   9. Have you ever had problems with anemia or been told to take iron pills? No   10. Have you had any abnormal blood loss such as black, tarry or bloody stools, or abnormal vaginal bleeding? YES - rectal bleeding in past month - dark color    11. Have you ever had a blood transfusion? No   12. Are you willing to have a blood transfusion if it is medically needed before, during, or after your surgery? Yes   13. Have you or any of your relatives ever had problems with anesthesia? No   14. Do you have sleep apnea, excessive snoring or daytime drowsiness? No   15. Do you have any artifical heart valves or other implanted medical devices like a pacemaker, defibrillator, or continuous glucose monitor? No   16. Do you have artificial joints? No   17. Are you allergic to latex? No   18. Is there any chance that you may be pregnant? No   Fusion in C5-6 cervical     Health Care Directive:  Patient does not have a Health Care Directive or Living Will: Discussed advance care planning with patient; however, patient  declined at this time.    Preoperative Review of :   reviewed - controlled substances reflected in medication list.      Status of Chronic Conditions:  COPD - Patient has a longstanding history of moderate-severe COPD . Patient has been doing well overall noting SOB and continues on medication regimen consisting of inhaler  without adverse reactions or side effects.    DIABETES - Patient has a longstanding history of DiabetesType Type II . Patient is being treated with oral agents and denies significant side effects. Control has been good.   Lab Results   Component Value Date    A1C 5.6 10/16/2020    A1C 6.0 02/20/2020    A1C 5.6 05/10/2019    A1C 5.4 03/21/2013         HYPERTENSION - Patient has longstanding history of HTN , currently denies any symptoms referable to elevated blood pressure. Specifically denies chest pain, palpitations, dyspnea, orthopnea, PND or peripheral edema. Blood pressure readings have been in normal range. Current medication regimen is as listed below. Patient denies any side effects of medication.       Review of Systems  Constitutional, neuro, ENT, endocrine, pulmonary, cardiac, gastrointestinal, genitourinary, musculoskeletal, integument and psychiatric systems are negative, except as otherwise noted.    Patient Active Problem List    Diagnosis Date Noted     Obstructive chronic bronchitis with exacerbation (H) 09/09/2020     Priority: Medium     Personal history of tobacco use, presenting hazards to health 09/09/2020     Priority: Medium     Morbid obesity (H) 08/31/2020     Priority: Medium     Claudication of both lower extremities (H) 03/06/2020     Priority: Medium     PAD (peripheral artery disease) (H) 03/06/2020     Priority: Medium     Jaw claudication 03/06/2020     Priority: Medium     Added automatically from request for surgery [id number]       History of opioid abuse (H) Rx was stopped when she failed urine drug test 12/13/2019     Priority: Medium     HTN, goal below  "140/90 01/12/2019     Priority: Medium     Vocal cord polyp 06/28/2018     Priority: Medium     Status post cervical spinal fusion 05/01/2018     Priority: Medium     Intentional drug overdose (H) 02/08/2018     Priority: Medium     Cervical HNP C5-6 11/09/2017     Priority: Medium     DIAZ (nonalcoholic steatohepatitis) 06/17/2016     Priority: Medium     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.  Doesn't drink, but relapsed (doesn't remember).  Was on Valium for 4 years (until October 2017). Xanax since 11/2017.  Managed by hospital.        Past Medical History:   Diagnosis Date     Anxiety state, unspecified      Asthma      Chronic pain     back pain from cyst     Contact dermatitis and other eczema, due to unspecified cause      COPD (chronic obstructive pulmonary disease) (H)      Depressive disorder, not elsewhere classified      Diabetes (H)      Emphysema with chronic bronchitis (H)      Esophageal reflux      Family history of ischemic heart disease      Fibromyalgia      Gastro-oesophageal reflux disease      History of emphysema      Hoarseness      HTN, goal below 140/90      Hyperlipidemia LDL goal <130      Liver disease     \"fatty liver\"     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)      PONV (postoperative nausea and vomiting)      Rectal bleeding      Past Surgical History:   Procedure Laterality Date     ARTHROSCOPY SHOULDER DECOMPRESSION Left 10/21/2015    Procedure: ARTHROSCOPY " SHOULDER DECOMPRESSION;  Surgeon: Julien Milian MD;  Location: [facility] OR     BIOPSY ARTERY TEMPORAL Left 3/11/2020    Procedure: LEFT TEMPORAL ARTERY BIOPSY;  Surgeon: Ibeth Conner MD;  Location: [facility] OR     BRONCHIAL THERMOPLASTY N/A 11/14/2014    Procedure: BRONCHIAL THERMOPLASTY;  Surgeon: Ward Whitaker MD;  Location: [facility]     BRONCHIAL THERMOPLASTY N/A 12/19/2014    Procedure: BRONCHIAL THERMOPLASTY;  Surgeon: Ward Whitaker MD;  [facility]     BRONCHIAL THERMOPLASTY N/A 2/6/2015    Procedure: BRONCHIAL THERMOPLASTY;  Surgeon: Ward Whitaker MD;  Location: [facility]     C APPENDECTOMY  at age 17     COLONOSCOPY N/A 11/26/2018    Procedure: COLONOSCOPY ([facility]);  Surgeon: Marshall Oakes MD;  Location: [facility]     COLONOSCOPY N/A 10/22/2020    Procedure: Colonoscopy;  Surgeon: Marshall Mccormick MD;  Location: [facility] OR     DISCECTOMY, FUSION CERVICAL ANTERIOR ONE LEVEL, COMBINED N/A 5/1/2018    Procedure: COMBINED DISCECTOMY, FUSION CERVICAL ANTERIOR ONE LEVEL;  1.  C5-C6 anterior cervical diskectomy and fusion.    2.  C5-C6 application of intervertebral biomechanical device for interbody fusion purposes.    3.  C5-C6 anterior instrumentation using the standard Kristin InViZia 24 mm plate with four associated bone screws, 12 mm in length.  Inferior screws are fixed.  Superior screws are va     ENT SURGERY  2015    polyps removed from vocal cords      ESOPHAGOSCOPY, GASTROSCOPY, DUODENOSCOPY (EGD), COMBINED N/A 10/22/2020    Procedure: Esophagoscopy, gastroscopy, duodenoscopy with biopsies;  Surgeon: Marshall Mccormcik MD;  Location: [facility] OR     EXCISE NODE MEDIASTINAL  4/26/2013    Procedure: EXCISE NODE MEDIASTINAL;;  Surgeon: Av Peña MD;  Location:  OR     LAPAROSCOPIC CHOLECYSTECTOMY N/A 10/19/2017    Procedure: LAPAROSCOPIC CHOLECYSTECTOMY;  LAPAROSCOPIC CHOLECYSTECTOMY;  Surgeon: Ney Jerry MD;  Location:  OR     THORACOSCOPY  4/26/2013    Procedure:  THORACOSCOPY;  LEFT VIDEO ASSISTED THORACOSCOPY, RESECTION OF POSTERIOR MEDIASTINAL MASS;  Surgeon: Av Peña MD;  Location:  OR     TONSILLECTOMY  as a kid     TONSILLECTOMY       Current Outpatient Medications   Medication Sig Dispense Refill     acetaminophen (TYLENOL) 325 MG tablet Take 3 tablets (975 mg) by mouth every 6 hours as needed for pain 50 tablet 0     albuterol (PROVENTIL) (2.5 MG/3ML) 0.083% neb solution Take 1 vial (2.5 mg) by nebulization every 6 hours as needed for shortness of breath / dyspnea or wheezing 25 vial 3     atorvastatin (LIPITOR) 80 MG tablet TAKE ONE TABLET BY MOUTH EVERY DAY 90 tablet 0     benzoyl peroxide (ACNE-CLEAR) 10 % external gel Apply topically 2 times daily as needed       blood glucose (ACCU-CHEK ALLYSON PLUS) test strip USE TO TEST BLOOD SUGAR ONE TIME DAILY OR AS DIRECTED 100 each 0     blood glucose (NO BRAND SPECIFIED) lancets standard Use to test blood sugar 1 times daily or as directed. 100 each 3     blood glucose (NO BRAND SPECIFIED) lancets standard Use to test blood sugar 1 time daily 100 each 1     blood glucose (NO BRAND SPECIFIED) test strip Use to test blood sugar 1 times daily or as directed.  Dispense Accu-chek Allyson Plus or per patient insurance 100 strip 3     blood glucose monitoring (NO BRAND SPECIFIED) meter device kit Use to test blood sugar 1 times daily or as directed.  Dispense Accu-chek Allyson Plus or per insurance preference 1 kit 0     blood glucose monitoring (NO BRAND SPECIFIED) meter device kit Use to test blood sugar 1 times daily or as directed. 1 kit 0     buPROPion (WELLBUTRIN XL) 150 MG 24 hr tablet TAKE ONE TABLET BY MOUTH EVERY MORNING 90 tablet 2     cetirizine HCl 10 MG CAPS Take 1 capsule (10 mg) by mouth daily as needed Takes in the spring. 90 capsule 3     clindamycin 1 % EX external gel Apply topically 2 times daily 60 g 3     clonazePAM (KLONOPIN) 1 MG tablet Take 1 tablet (1 mg) by mouth 2 times daily as needed  for anxiety She needs to see her PCP to get more tablets 5 tablet 0     cyclobenzaprine (FLEXERIL) 5 MG tablet Take 1 tablet (5 mg) by mouth 3 times daily as needed for muscle spasms 30 tablet 1     desonide (DESOWEN) 0.05 % external cream Apply topically daily       DULoxetine (CYMBALTA) 20 MG capsule Take 1 capsule (20 mg) by mouth daily X 7 days and if well tolerated increase to 40 mg daily. 53 capsule 1     fluticasone (FLONASE) 50 MCG/ACT nasal spray Spray 2 sprays in nostril daily 16 g 5     Fluticasone-Umeclidin-Vilanterol (TRELEGY ELLIPTA) 100-62.5-25 MCG/INH oral inhaler Inhale 1 puff into the lungs daily 28 each 3     furosemide (LASIX) 20 MG tablet TAKE ONE TABLET BY MOUTH EVERY DAY 90 tablet 0     glipiZIDE (GLUCOTROL XL) 2.5 MG 24 hr tablet TAKE ONE TABLET BY MOUTH EVERY DAY 90 tablet 1     hydrocortisone 2.5 % cream APPLY TOPICALLY TO THE AFFECTED AREA(S) TWO TIMES A DAY 30 g 1     hydrOXYzine (ATARAX) 50 MG tablet TAKE TWO TABLETS BY MOUTH THREE TIMES A DAY AS NEEDED FOR ANXIETY MAX 70 TABLETS PER WEEK 70 tablet 5     lamoTRIgine (LAMICTAL) 100 MG tablet 100 mg daily        lamoTRIgine (LAMICTAL) 150 MG tablet Take 150 mg by mouth daily       lisinopril (ZESTRIL) 20 MG tablet TAKE ONE TABLET BY MOUTH EVERY DAY 90 tablet 0     metFORMIN (GLUCOPHAGE) 500 MG tablet TAKE ONE TABLET BY MOUTH EVERY DAY WITH BREAKFAST 90 tablet 1     methocarbamol (ROBAXIN) 500 MG tablet Take 1 tablet (500 mg) by mouth 3 times daily as needed for muscle spasms 120 tablet 0     montelukast (SINGULAIR) 10 MG tablet Take 1 tablet (10 mg) by mouth At Bedtime 90 tablet 4     nicotine (NICORETTE) 2 MG gum Place 1 each (2 mg) inside cheek as needed for smoking cessation 150 each 1     nitroGLYcerin (NITROSTAT) 0.4 MG sublingual tablet PLACE ONE TABLET UNDER THE TONGUE AT THE 1ST SIGN OF ATTACK. IF PAIN IS UNRELIEVED OR WORSENED 5 MINUTES AFTER 1ST DOSE, PROMPT MEDICAL ASSISTANCE IS NEEDED. MAY REPEAT EVERY 5 MINUTES UNTIL PAIN IS  RELIEVED. MAX OF THREE DOSES 25 tablet 3     nystatin (MYCOSTATIN) 087860 UNIT/GM external powder APPLY TOPICALLY TWO TIMES A DAY AS NEEDED FOR SKIN RASH 45 g 0     omeprazole (PRILOSEC) 20 MG DR capsule TAKE ONE CAPSULE BY MOUTH TWO TIMES A DAY       omeprazole 20 MG tablet TAKE ONE TABLET BY MOUTH TWICE A DAY 60 tablet 0     ondansetron (ZOFRAN ODT) 4 MG ODT tab Take 1 tablet (4 mg) by mouth every 6 hours as needed for nausea or vomiting 12 tablet 0     ondansetron (ZOFRAN) 8 MG tablet TAKE ONE-HALF TO ONE TABLET BY MOUTH EVERY 8 HOURS AS NEEDED FOR NAUSEA 30 tablet 1     order for DME Please provide oximeter 1 Device 0     order for DME Equipment being ordered: Digital home blood pressure monitor kit 1 Device 0     order for DME Equipment being ordered: Pulse Oxymeter 1 Device 0     polyethylene glycol (MIRALAX) 17 GM/Dose powder Take 17 g (1 capful) by mouth daily 507 g 1     predniSONE (DELTASONE) 10 MG tablet -- take 2 tablets twice a day for 3 days then -- take 2 tablets daily for 10 days, -- then take 1 tab daily for 10 days 42 tablet 0     prochlorperazine (COMPAZINE) 10 MG tablet Take 1 tablet (10 mg) by mouth every 8 hours as needed for nausea or vomiting 30 tablet 0     sucralfate (CARAFATE) 1 GM tablet Take 1 tablet (1 g) by mouth 4 times daily 120 tablet 1     sucralfate (CARAFATE) 1 GM/10ML suspension Take 10 mLs (1 g) by mouth 4 times daily as needed for nausea (Pain) 420 mL 0     sulfamethoxazole-trimethoprim (BACTRIM DS) 800-160 MG tablet Take 1 tablet by mouth 2 times daily Take one tablet twice daily. For additional refills, please schedule a follow-up appointment. 180 tablet 1     terbinafine (LAMISIL) 1 % external cream Apply topically 2 times daily 30 g 2     valACYclovir (VALTREX) 1000 mg tablet Take 2 tablets (2,000 mg) by mouth 2 times daily for 1 day 4 tablet 0     VENTOLIN  (90 Base) MCG/ACT inhaler INHALE TWO PUFFS BY MOUTH EVERY 6 HOURS AS NEEDED FOR SHORTNESS OF BREATH 18 g 1        Allergies   Allergen Reactions     Codeine Nausea and Vomiting     vomiting     Cyclobenzaprine Nausea     Hydrocodone Nausea and Vomiting     Sulfa Drugs Nausea and Vomiting     Nausea     Gabapentin Rash        Social History     Tobacco Use     Smoking status: Current Every Day Smoker     Packs/day: 0.50     Years: 30.00     Pack years: 15.00     Types: Cigarettes     Start date: 1/1/1985     Smokeless tobacco: Never Used     Tobacco comment: Is trying to quit, using nicotine gum and patch   Substance Use Topics     Alcohol use: No     Alcohol/week: 0.0 standard drinks     Family History   Problem Relation Age of Onset     Heart Disease Mother         murmur, mi     Hypertension Mother      Cerebrovascular Disease Mother      Depression Mother      Gallbladder Disease Mother      Asthma Mother      Family History Negative Father         does not know  him     Family History Negative Brother      Heart Disease Maternal Grandfather      Asthma Maternal Grandfather      Hypertension Maternal Grandfather      Cerebrovascular Disease Maternal Grandfather      Diabetes Maternal Grandfather      Asthma Sister      Hypertension Sister      Cerebrovascular Disease Sister      Hypertension Maternal Grandmother      Cerebrovascular Disease Maternal Grandmother      History   Drug Use No         Objective     LMP 04/15/2016 (Exact Date)     Physical Exam    GENERAL APPEARANCE: alert and no distress     HENT: ear canals and TM's normal and nose and mouth without ulcers or lesions     RESP: lungs clear to auscultation - no rales, rhonchi or wheezes     CV: regular rates and rhythm, normal S1 S2, no S3 or S4 and no murmur, click or rub     ABDOMEN:  soft, obese nontender,  and bowel sounds normal     MS: extremities normal- no gross deformities noted, no evidence of inflammation in joints, FROM in all extremities.     SKIN: no suspicious lesions or rashes     NEURO: Normal strength and tone, sensory exam grossly normal,  mentation intact and speech normal     PSYCH: mentation appears normal. and affect normal/bright    Recent Labs   Lab Test 05/22/21  0151 05/12/21  1733 11/20/20  0622 11/20/20  0622 10/16/20  1507 09/29/20  1527 02/20/20  0920 02/20/20  0920   HGB 12.4 13.8   < > 11.6* 12.6 12.3   < > 14.5    387   < > 374  --  351   < > 386   INR  --   --   --  0.99  --  0.94  --   --     136   < > 136  --  137   < > 141   POTASSIUM 3.6 3.8   < > 4.3 4.8 4.6   < > 4.3   CR 1.01 0.98   < > 0.91  --  1.07*   < > 0.71   A1C  --   --   --   --  5.6  --   --  6.0*    < > = values in this interval not displayed.        Diagnostics:  Recent Results (from the past 720 hour(s))   Lactate Dehydrogenase    Collection Time: 05/20/21 11:25 AM   Result Value Ref Range    Lactate Dehydrogenase 168 81 - 234 U/L   TSH with free T4 reflex    Collection Time: 05/20/21 11:25 AM   Result Value Ref Range    TSH 1.48 0.40 - 4.00 mU/L   Quantiferon TB Gold Plus    Collection Time: 05/20/21 11:25 AM    Specimen: Blood   Result Value Ref Range    MTB Quantiferon Result Negative NEG^Negative    TB1 Ag minus Nil 0.03 IU/mL    TB2 Ag minus Nil 0.04 IU/mL    Mitogen minus Nil 10.00 IU/mL    NIL Result 0.00 IU/mL   Catecholamines fractioned free urine    Collection Time: 05/20/21 11:34 AM   Result Value Ref Range    Hours Collected RANDOM       Total Urine Volume 30       Urine Dopamine/Creatinine 72 0 - 250 ug/g CRT    Urine Dopamine Not Applicable 71 - 485 ug/d    Dopamine per Volume Urine 59 ug/L    Urine Norepineph/Creatinine 54 (H) 0 - 45 ug/g CRT    Urine Norepinephrine Not Applicable 14 - 120 ug/d    Norepinephrine per Volume Urine 44 ug/L    Urine Epinephrine/Creatinine 4 0 - 20 ug/g CRT    Urine Epinephrine Not Applicable 1 - 14    Epinephrine per Volume Urine 3 ug/L    Catecholamines Fract Urine Free Interp SEE NOTE     Creatinine Urine/Volume 82 mg/dL    Creatinine Urine/24hr Not Applicable 500 - 1400 mg/d   5-HIAA quantitative urine     Collection Time: 05/20/21 11:34 AM   Result Value Ref Range    5-HIAA Duration Urine Random hr    Volume 30 mL    5 HIAA 2.9 mg/L    HIAA-24 h Not Applicable 0 - 15 mg/d    HIAA-5 Creatinine Ratio 4 0 - 14 mg/gCR    Creatinine For 5-HIAA 24 H/Random Urine 82 mg/dL    5-HIAA Creatinine Quant Urine Not Applicable 500 - 1400 mg/d    5-HIAA 24 Hr/Random Urine Interpretation SEE NOTE    Comprehensive metabolic panel    Collection Time: 05/22/21  1:51 AM   Result Value Ref Range    Sodium 142 133 - 144 mmol/L    Potassium 3.6 3.4 - 5.3 mmol/L    Chloride 108 94 - 109 mmol/L    Carbon Dioxide 26 20 - 32 mmol/L    Anion Gap 8 3 - 14 mmol/L    Glucose 75 70 - 99 mg/dL    Urea Nitrogen 14 7 - 30 mg/dL    Creatinine 1.01 0.52 - 1.04 mg/dL    GFR Estimate 64 >60 mL/min/[1.73_m2]    GFR Estimate If Black 74 >60 mL/min/[1.73_m2]    Calcium 8.9 8.5 - 10.1 mg/dL    Bilirubin Total 0.2 0.2 - 1.3 mg/dL    Albumin 3.7 3.4 - 5.0 g/dL    Protein Total 7.4 6.8 - 8.8 g/dL    Alkaline Phosphatase 154 (H) 40 - 150 U/L    ALT 80 (H) 0 - 50 U/L    AST 21 0 - 45 U/L   Lipase    Collection Time: 05/22/21  1:51 AM   Result Value Ref Range    Lipase 101 73 - 393 U/L   CBC with platelets differential    Collection Time: 05/22/21  1:51 AM   Result Value Ref Range    WBC 10.0 4.0 - 11.0 10e9/L    RBC Count 4.70 3.8 - 5.2 10e12/L    Hemoglobin 12.4 11.7 - 15.7 g/dL    Hematocrit 38.9 35.0 - 47.0 %    MCV 83 78 - 100 fl    MCH 26.4 (L) 26.5 - 33.0 pg    MCHC 31.9 31.5 - 36.5 g/dL    RDW 16.8 (H) 10.0 - 15.0 %    Platelet Count 349 150 - 450 10e9/L    Diff Method Automated Method     % Neutrophils 46.4 %    % Lymphocytes 47.5 %    % Monocytes 4.9 %    % Eosinophils 0.0 %    % Basophils 0.3 %    % Immature Granulocytes 0.9 %    Nucleated RBCs 0 0 /100    Absolute Neutrophil 4.7 1.6 - 8.3 10e9/L    Absolute Lymphocytes 4.8 0.8 - 5.3 10e9/L    Absolute Monocytes 0.5 0.0 - 1.3 10e9/L    Absolute Eosinophils 0.0 0.0 - 0.7 10e9/L    Absolute Basophils 0.0  0.0 - 0.2 10e9/L    Abs Immature Granulocytes 0.1 0 - 0.4 10e9/L    Absolute Nucleated RBC 0.0    Alcohol ethyl    Collection Time: 05/22/21  1:51 AM   Result Value Ref Range    Ethanol g/dL 0.17 (H) <0.01 g/dL   EYE EXAM - HIM SCAN    Collection Time: 05/27/21 12:00 AM   Result Value Ref Range    RETINOPATHY NEGATIVE    Asymptomatic COVID-19 Virus (Coronavirus) by PCR    Collection Time: 06/14/21 11:06 AM    Specimen: Nasopharyngeal   Result Value Ref Range    COVID-19 Virus PCR to [facility] - Source Nasopharyngeal     COVID-19 Virus PCR to [facility] - Result       Test received-See reflex to IDDL test SARS CoV2 (COVID-19) Virus RT-PCR   SARS-CoV-2 COVID-19 Virus (Coronavirus) by PCR    Collection Time: 06/14/21 11:06 AM    Specimen: Nasopharyngeal   Result Value Ref Range    SARS-CoV-2 Virus Specimen Source Nasopharyngeal     SARS-CoV-2 PCR Result NEGATIVE     SARS-CoV-2 PCR Comment       Testing was performed using the pia SARS-CoV-2 assay on the pia 6800 System.2      EKG: appears normal, NSR, 5/2021    Revised Cardiac Risk Index (RCRI):  The patient has the following serious cardiovascular risks for perioperative complications:   - No serious cardiac risks = 0 points     RCRI Interpretation: 2 points: Class III (moderate risk - 6.6% complication rate)     Estimated Functional Capacity: Performs 4 METS exercise without symptoms (e.g., light housework, stairs, 4 mph walk, 7 mph bike, slow step dance)           Signed Electronically by: LEIGH Long CNP  Copy of this evaluation report is provided to requesting physician.

## 2021-06-17 ENCOUNTER — ANESTHESIA (OUTPATIENT)
Dept: GASTROENTEROLOGY | Facility: CLINIC | Age: 51
End: 2021-06-17
Payer: COMMERCIAL

## 2021-06-17 ENCOUNTER — ANESTHESIA EVENT (OUTPATIENT)
Dept: GASTROENTEROLOGY | Facility: CLINIC | Age: 51
End: 2021-06-17
Payer: COMMERCIAL

## 2021-06-17 ENCOUNTER — HOSPITAL ENCOUNTER (OUTPATIENT)
Facility: CLINIC | Age: 51
Discharge: HOME | End: 2021-06-17
Attending: SPECIALIST | Admitting: SPECIALIST
Payer: COMMERCIAL

## 2021-06-17 VITALS
OXYGEN SATURATION: 97 % | DIASTOLIC BLOOD PRESSURE: 92 MMHG | SYSTOLIC BLOOD PRESSURE: 119 MMHG | WEIGHT: 241 LBS | HEIGHT: 63 IN | HEART RATE: 81 BPM | BODY MASS INDEX: 42.7 KG/M2 | RESPIRATION RATE: 24 BRPM

## 2021-06-17 LAB — UPPER GI ENDOSCOPY: NORMAL

## 2021-06-17 PROCEDURE — 250N000009 HC RX 250: Performed by: NURSE ANESTHETIST, CERTIFIED REGISTERED

## 2021-06-17 PROCEDURE — 88342 IMHCHEM/IMCYTCHM 1ST ANTB: CPT | Mod: 26 | Performed by: PATHOLOGY

## 2021-06-17 PROCEDURE — 43239 EGD BIOPSY SINGLE/MULTIPLE: CPT | Performed by: SPECIALIST

## 2021-06-17 PROCEDURE — 999N000010 HC STATISTIC ANES STAT CODE-CRNA PER MINUTE: Performed by: SPECIALIST

## 2021-06-17 PROCEDURE — 88305 TISSUE EXAM BY PATHOLOGIST: CPT | Mod: 26 | Performed by: PATHOLOGY

## 2021-06-17 PROCEDURE — 250N000009 HC RX 250: Performed by: ANESTHESIOLOGY

## 2021-06-17 PROCEDURE — 88305 TISSUE EXAM BY PATHOLOGIST: CPT | Mod: TC | Performed by: SPECIALIST

## 2021-06-17 PROCEDURE — 258N000003 HC RX IP 258 OP 636: Performed by: NURSE ANESTHETIST, CERTIFIED REGISTERED

## 2021-06-17 PROCEDURE — 250N000011 HC RX IP 250 OP 636: Performed by: ANESTHESIOLOGY

## 2021-06-17 PROCEDURE — 88342 IMHCHEM/IMCYTCHM 1ST ANTB: CPT | Mod: TC | Performed by: SPECIALIST

## 2021-06-17 PROCEDURE — 250N000011 HC RX IP 250 OP 636: Performed by: NURSE ANESTHETIST, CERTIFIED REGISTERED

## 2021-06-17 PROCEDURE — 370N000017 HC ANESTHESIA TECHNICAL FEE, PER MIN: Performed by: SPECIALIST

## 2021-06-17 RX ORDER — NALOXONE HYDROCHLORIDE 0.4 MG/ML
0.2 INJECTION, SOLUTION INTRAMUSCULAR; INTRAVENOUS; SUBCUTANEOUS
Status: DISCONTINUED | OUTPATIENT
Start: 2021-06-17 | End: 2021-06-17 | Stop reason: HOSPADM

## 2021-06-17 RX ORDER — PROPOFOL 10 MG/ML
INJECTION, EMULSION INTRAVENOUS PRN
Status: DISCONTINUED | OUTPATIENT
Start: 2021-06-17 | End: 2021-06-17

## 2021-06-17 RX ORDER — ONDANSETRON 2 MG/ML
4 INJECTION INTRAMUSCULAR; INTRAVENOUS EVERY 30 MIN PRN
Status: DISCONTINUED | OUTPATIENT
Start: 2021-06-17 | End: 2021-06-17 | Stop reason: HOSPADM

## 2021-06-17 RX ORDER — SODIUM CHLORIDE, SODIUM LACTATE, POTASSIUM CHLORIDE, CALCIUM CHLORIDE 600; 310; 30; 20 MG/100ML; MG/100ML; MG/100ML; MG/100ML
INJECTION, SOLUTION INTRAVENOUS CONTINUOUS PRN
Status: DISCONTINUED | OUTPATIENT
Start: 2021-06-17 | End: 2021-06-17

## 2021-06-17 RX ORDER — SCOLOPAMINE TRANSDERMAL SYSTEM 1 MG/1
1 PATCH, EXTENDED RELEASE TRANSDERMAL
Status: DISCONTINUED | OUTPATIENT
Start: 2021-06-17 | End: 2021-06-17 | Stop reason: HOSPADM

## 2021-06-17 RX ORDER — NALOXONE HYDROCHLORIDE 0.4 MG/ML
0.4 INJECTION, SOLUTION INTRAMUSCULAR; INTRAVENOUS; SUBCUTANEOUS
Status: DISCONTINUED | OUTPATIENT
Start: 2021-06-17 | End: 2021-06-17 | Stop reason: HOSPADM

## 2021-06-17 RX ORDER — SODIUM CHLORIDE, SODIUM LACTATE, POTASSIUM CHLORIDE, CALCIUM CHLORIDE 600; 310; 30; 20 MG/100ML; MG/100ML; MG/100ML; MG/100ML
INJECTION, SOLUTION INTRAVENOUS CONTINUOUS
Status: DISCONTINUED | OUTPATIENT
Start: 2021-06-17 | End: 2021-06-17 | Stop reason: HOSPADM

## 2021-06-17 RX ORDER — HYDROMORPHONE HYDROCHLORIDE 1 MG/ML
.3-.5 INJECTION, SOLUTION INTRAMUSCULAR; INTRAVENOUS; SUBCUTANEOUS EVERY 5 MIN PRN
Status: DISCONTINUED | OUTPATIENT
Start: 2021-06-17 | End: 2021-06-17 | Stop reason: HOSPADM

## 2021-06-17 RX ORDER — ONDANSETRON 4 MG/1
4 TABLET, ORALLY DISINTEGRATING ORAL EVERY 30 MIN PRN
Status: DISCONTINUED | OUTPATIENT
Start: 2021-06-17 | End: 2021-06-17 | Stop reason: HOSPADM

## 2021-06-17 RX ORDER — ONDANSETRON 2 MG/ML
4 INJECTION INTRAMUSCULAR; INTRAVENOUS
Status: DISCONTINUED | OUTPATIENT
Start: 2021-06-17 | End: 2021-06-17 | Stop reason: HOSPADM

## 2021-06-17 RX ORDER — PROPOFOL 10 MG/ML
INJECTION, EMULSION INTRAVENOUS CONTINUOUS PRN
Status: DISCONTINUED | OUTPATIENT
Start: 2021-06-17 | End: 2021-06-17

## 2021-06-17 RX ORDER — LIDOCAINE HYDROCHLORIDE 20 MG/ML
INJECTION, SOLUTION INFILTRATION; PERINEURAL PRN
Status: DISCONTINUED | OUTPATIENT
Start: 2021-06-17 | End: 2021-06-17

## 2021-06-17 RX ORDER — GLYCOPYRROLATE 0.2 MG/ML
INJECTION, SOLUTION INTRAMUSCULAR; INTRAVENOUS PRN
Status: DISCONTINUED | OUTPATIENT
Start: 2021-06-17 | End: 2021-06-17

## 2021-06-17 RX ORDER — FENTANYL CITRATE 50 UG/ML
25-50 INJECTION, SOLUTION INTRAMUSCULAR; INTRAVENOUS
Status: DISCONTINUED | OUTPATIENT
Start: 2021-06-17 | End: 2021-06-17 | Stop reason: HOSPADM

## 2021-06-17 RX ORDER — LIDOCAINE 40 MG/G
CREAM TOPICAL
Status: DISCONTINUED | OUTPATIENT
Start: 2021-06-17 | End: 2021-06-17 | Stop reason: HOSPADM

## 2021-06-17 RX ADMIN — PROPOFOL 30 MG: 10 INJECTION, EMULSION INTRAVENOUS at 14:07

## 2021-06-17 RX ADMIN — PHENYLEPHRINE HYDROCHLORIDE 150 MCG: 10 INJECTION INTRAVENOUS at 14:11

## 2021-06-17 RX ADMIN — MIDAZOLAM HYDROCHLORIDE 1 MG: 1 INJECTION, SOLUTION INTRAMUSCULAR; INTRAVENOUS at 13:39

## 2021-06-17 RX ADMIN — PROPOFOL 20 MG: 10 INJECTION, EMULSION INTRAVENOUS at 14:08

## 2021-06-17 RX ADMIN — GLYCOPYRROLATE 0.2 MG: 0.2 INJECTION, SOLUTION INTRAMUSCULAR; INTRAVENOUS at 14:01

## 2021-06-17 RX ADMIN — PROPOFOL 150 MCG/KG/MIN: 10 INJECTION, EMULSION INTRAVENOUS at 14:03

## 2021-06-17 RX ADMIN — PROPOFOL 30 MG: 10 INJECTION, EMULSION INTRAVENOUS at 14:05

## 2021-06-17 RX ADMIN — LIDOCAINE HYDROCHLORIDE 60 MG: 20 INJECTION, SOLUTION INFILTRATION; PERINEURAL at 14:05

## 2021-06-17 RX ADMIN — SODIUM CHLORIDE, POTASSIUM CHLORIDE, SODIUM LACTATE AND CALCIUM CHLORIDE: 600; 310; 30; 20 INJECTION, SOLUTION INTRAVENOUS at 14:00

## 2021-06-17 RX ADMIN — PHENYLEPHRINE HYDROCHLORIDE 100 MCG: 10 INJECTION INTRAVENOUS at 14:16

## 2021-06-17 RX ADMIN — SCOPALAMINE 1 PATCH: 1 PATCH, EXTENDED RELEASE TRANSDERMAL at 12:11

## 2021-06-17 ASSESSMENT — COPD QUESTIONNAIRES
COPD: 1
CAT_SEVERITY: MODERATE

## 2021-06-17 ASSESSMENT — MIFFLIN-ST. JEOR: SCORE: 1672.3

## 2021-06-17 ASSESSMENT — LIFESTYLE VARIABLES: TOBACCO_USE: 1

## 2021-06-17 NOTE — H&P
Pre-Endoscopy History and Physical     Swetha Patterson MRN# [id number]   YOB: 1970 Age: 51 year old     Date of Procedure: 6/17/2021  Primary care provider: Roro Chawla  Type of Endoscopy: Gastroscopy with possible biopsy, possible dilation  Reason for Procedure: Epigastric pain; a grabbing or twisting feeling, like food is going no further, and nausea.  Type of Anesthesia Anticipated: Monitored anesthesia care    HPI:    Swetha is a 51 year old female who will be undergoing the above procedure.      A history and physical has been performed. The patient's medications and allergies have been reviewed. The risks and benefits of the procedure and the sedation options and risks were discussed with the patient.  All questions were answered and informed consent was obtained.      She denies a personal or family history of anesthesia complications or bleeding disorders.       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     DIAZ (nonalcoholic steatohepatitis)     Cervical HNP C5-6     Intentional drug overdose (H)     Status post cervical spinal fusion     Depression     Vocal cord polyp     HTN, goal below 140/90     COPD, moderate (H)     History of opioid abuse (H) Rx was stopped when she failed urine drug test     Claudication of both lower extremities (H)     PAD (peripheral artery disease) (H)     Jaw claudication     Morbid obesity (H)     Obstructive chronic bronchitis with exacerbation (H)     Personal history of tobacco use, presenting hazards to health        Past Medical History:   Diagnosis Date     Anxiety state, unspecified      Asthma      Chronic pain     back pain from cyst     Contact dermatitis and other eczema, due to unspecified cause      COPD (chronic  "obstructive pulmonary disease) (H)      Depressive disorder, not elsewhere classified      Diabetes (H)      Emphysema with chronic bronchitis (H)      Esophageal reflux      Family history of ischemic heart disease      Fibromyalgia      Gastro-oesophageal reflux disease      Heart disease     has chest pain sometimes     History of emphysema      Hoarseness      HTN, goal below 140/90      Hyperlipidemia LDL goal <130      Liver disease     \"fatty liver\"     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)      PONV (postoperative nausea and vomiting)      Rectal bleeding         Past Surgical History:   Procedure Laterality Date     ARTHROSCOPY SHOULDER DECOMPRESSION Left 10/21/2015    Procedure: ARTHROSCOPY SHOULDER DECOMPRESSION;  Surgeon: Julien Milian MD;  Location: [facility]     BIOPSY ARTERY TEMPORAL Left 3/11/2020    Procedure: LEFT TEMPORAL ARTERY BIOPSY;  Surgeon: Ibeth Conner MD;  Location: [facility]     BRONCHIAL THERMOPLASTY N/A 11/14/2014    Procedure: BRONCHIAL THERMOPLASTY;  Surgeon: Ward Whitaker MD;  [facility]     BRONCHIAL THERMOPLASTY N/A 12/19/2014    Procedure: BRONCHIAL THERMOPLASTY;  Surgeon: Ward Whitaker MD;  [facility]     BRONCHIAL THERMOPLASTY N/A 2/6/2015    Procedure: BRONCHIAL THERMOPLASTY;  Surgeon: Ward Whitaker MD;  [facility]     C APPENDECTOMY  at age 17     COLONOSCOPY N/A 11/26/2018    Procedure: COLONOSCOPY ([facility]);  Surgeon: Marshall Oakes MD;  [facility]: [facility]     COLONOSCOPY N/A 10/22/2020    Procedure: Colonoscopy;  Surgeon: Marshall Mccormick MD;  Location: [facility]     DISCECTOMY, FUSION CERVICAL ANTERIOR ONE LEVEL, COMBINED N/A 5/1/2018    Procedure: COMBINED DISCECTOMY, FUSION CERVICAL ANTERIOR ONE LEVEL;  1.  C5-C6 anterior cervical diskectomy and fusion.    2.  C5-C6 application of intervertebral biomechanical device for interbody fusion purposes.    3.  C5-C6 anterior instrumentation using the standard Kristin InViZia 24 mm " plate with four associated bone screws, 12 mm in length.  Inferior screws are fixed.  Superior screws are va     ENT SURGERY  2015    polyps removed from vocal cords      ESOPHAGOSCOPY, GASTROSCOPY, DUODENOSCOPY (EGD), COMBINED - This was normal.  N/A 10/22/2020    Procedure: Esophagoscopy, gastroscopy, duodenoscopy with biopsies;  Surgeon: Marshall Mccormick MD;  [facility]: [facility]     EXCISE NODE MEDIASTINAL  4/26/2013    Procedure: EXCISE NODE MEDIASTINAL;;  Surgeon: Av Peña MD;  Location:  OR     LAPAROSCOPIC CHOLECYSTECTOMY N/A 10/19/2017    Procedure: LAPAROSCOPIC CHOLECYSTECTOMY;  LAPAROSCOPIC CHOLECYSTECTOMY;  Surgeon: Ney Jerry MD;  Location:  OR     THORACOSCOPY  4/26/2013    Procedure: THORACOSCOPY;  LEFT VIDEO ASSISTED THORACOSCOPY, RESECTION OF POSTERIOR MEDIASTINAL MASS;  Surgeon: Av Peña MD;  Location:  OR     TONSILLECTOMY  as a kid     TONSILLECTOMY       EGD 9/22/17 Mee - for N & V: medium HH, esophageal erythema.  EGD 8/24/18 Schwake for N & V: gastric erosions//gastropathy  EGD 10/22/20 Jace for dysphagia: normal.     Social History     Tobacco Use     Smoking status: Current Every Day Smoker     Packs/day: 0.50     Years: 30.00     Pack years: 15.00     Types: Cigarettes     Start date: 1/1/1985     Smokeless tobacco: Never Used     Tobacco comment: Is trying to quit, using nicotine gum and patch   Substance Use Topics     Alcohol use: No     Alcohol/week: 0.0 standard drinks       Family History   Problem Relation Age of Onset     Heart Disease Mother         murmur, mi     Hypertension Mother      Cerebrovascular Disease Mother      Depression Mother      Gallbladder Disease Mother      Asthma Mother      Family History Negative Father         does not know  him     Family History Negative Brother      Heart Disease Maternal Grandfather      Asthma Maternal Grandfather      Hypertension Maternal Grandfather      Cerebrovascular Disease  Maternal Grandfather      Diabetes Maternal Grandfather      Asthma Sister      Hypertension Sister      Cerebrovascular Disease Sister      Hypertension Maternal Grandmother      Cerebrovascular Disease Maternal Grandmother        Prior to Admission medications    Medication Sig Start Date End Date Taking? Authorizing Provider   albuterol (PROVENTIL) (2.5 MG/3ML) 0.083% neb solution Take 1 vial (2.5 mg) by nebulization every 6 hours as needed for shortness of breath / dyspnea or wheezing 1/6/21  Yes Cassy Miranda MD   atorvastatin (LIPITOR) 80 MG tablet TAKE ONE TABLET BY MOUTH EVERY DAY 4/30/21  Yes Roro Chawla MD   buPROPion (WELLBUTRIN XL) 150 MG 24 hr tablet TAKE ONE TABLET BY MOUTH EVERY MORNING 3/1/21  Yes Roro Chawla MD   cetirizine HCl 10 MG CAPS Take 1 capsule (10 mg) by mouth daily as needed Takes in the spring. 1/12/21  Yes Roro Chawla MD   clindamycin 1 % EX external gel Apply topically 2 times daily 2/20/20  Yes Roro Chawla MD   clonazePAM (KLONOPIN) 1 MG tablet Take 1 tablet (1 mg) by mouth 2 times daily as needed for anxiety She needs to see her PCP to get more tablets 11/22/20  Yes Pan He MD   cyclobenzaprine (FLEXERIL) 5 MG tablet Take 1 tablet (5 mg) by mouth 3 times daily as needed for muscle spasms 9/27/20  Yes Roro Chawla MD   DULoxetine (CYMBALTA) 20 MG capsule Take 1 capsule (20 mg) by mouth daily X 7 days and if well tolerated increase to 40 mg daily. 5/18/21  Yes Fabiola Chambers MD   fluticasone (FLONASE) 50 MCG/ACT nasal spray Spray 2 sprays in nostril daily 5/20/21  Yes Roro Chawla MD   Fluticasone-Umeclidin-Vilanterol (TRELEGY ELLIPTA) 100-62.5-25 MCG/INH oral inhaler Inhale 1 puff into the lungs daily 6/8/21  Yes Rush Fraga MD   furosemide (LASIX) 20 MG tablet TAKE ONE TABLET BY MOUTH EVERY DAY 6/7/21  Yes Roro Chawla  MD Janine   glipiZIDE (GLUCOTROL XL) 2.5 MG 24 hr tablet TAKE ONE TABLET BY MOUTH EVERY DAY 5/28/21  Yes Roro Chawla MD   hydrocortisone 2.5 % cream APPLY TOPICALLY TO THE AFFECTED AREA(S) TWO TIMES A DAY 3/9/21  Yes Roro Chawla MD   hydrOXYzine (ATARAX) 50 MG tablet TAKE TWO TABLETS BY MOUTH THREE TIMES A DAY AS NEEDED FOR ANXIETY MAX 70 TABLETS PER WEEK 5/14/21  Yes Roro Chawla MD   lamoTRIgine (LAMICTAL) 100 MG tablet 100 mg daily  8/20/20  Yes Reported, Patient   lisinopril (ZESTRIL) 20 MG tablet TAKE ONE TABLET BY MOUTH EVERY DAY 4/2/21  Yes Roro Chawla MD   metFORMIN (GLUCOPHAGE) 500 MG tablet TAKE ONE TABLET BY MOUTH EVERY DAY WITH BREAKFAST 5/17/21  Yes Roro Chawla MD   montelukast (SINGULAIR) 10 MG tablet Take 1 tablet (10 mg) by mouth At Bedtime 9/13/20  Yes Roro Chawla MD   nitroGLYcerin (NITROSTAT) 0.4 MG sublingual tablet PLACE ONE TABLET UNDER THE TONGUE AT THE 1ST SIGN OF ATTACK. IF PAIN IS UNRELIEVED OR WORSENED 5 MINUTES AFTER 1ST DOSE, PROMPT MEDICAL ASSISTANCE IS NEEDED. MAY REPEAT EVERY 5 MINUTES UNTIL PAIN IS RELIEVED. MAX OF THREE DOSES 3/25/21  Yes Roro Chawla MD   nystatin (MYCOSTATIN) 986941 UNIT/GM external powder APPLY TOPICALLY TWO TIMES A DAY AS NEEDED FOR SKIN RASH 4/16/21  Yes Roro Chawla MD   omeprazole (PRILOSEC) 20 MG DR capsule TAKE ONE CAPSULE BY MOUTH TWO TIMES A DAY 6/3/21  Yes Reported, Patient   ondansetron (ZOFRAN ODT) 4 MG ODT tab Take 1 tablet (4 mg) by mouth every 6 hours as needed for nausea or vomiting 5/12/21  Yes Keo Fox PA-C   polyethylene glycol (MIRALAX) 17 GM/Dose powder Take 17 g (1 capful) by mouth daily 2/26/21  Yes Roro Chawla MD   sucralfate (CARAFATE) 1 GM tablet Take 1 tablet (1 g) by mouth 4 times daily 5/20/21  Yes Roro Chawla MD    sulfamethoxazole-trimethoprim (BACTRIM DS) 800-160 MG tablet Take 1 tablet by mouth 2 times daily Take one tablet twice daily. For additional refills, please schedule a follow-up appointment. 4/30/21  Yes Lamont Jordan MD   VENTOLIN  (90 Base) MCG/ACT inhaler INHALE TWO PUFFS BY MOUTH EVERY 6 HOURS AS NEEDED FOR SHORTNESS OF BREATH 5/17/21  Yes Roro Chawla MD   benzoyl peroxide (ACNE-CLEAR) 10 % external gel Apply topically 2 times daily as needed    Unknown, Entered By History   blood glucose (ACCU-CHEK OLINDA PLUS) test strip USE TO TEST BLOOD SUGAR ONE TIME DAILY OR AS DIRECTED 8/8/20   Roro Chawla MD   blood glucose (NO BRAND SPECIFIED) lancets standard Use to test blood sugar 1 times daily or as directed. 9/10/20   Roro Cahwla MD   blood glucose (NO BRAND SPECIFIED) lancets standard Use to test blood sugar 1 time daily 6/2/20   Roro Chawla MD   blood glucose (NO BRAND SPECIFIED) test strip Use to test blood sugar 1 times daily or as directed.  Dispense Accu-chek Olinda Plus or per patient insurance 9/10/20   Roro Chawla MD   blood glucose monitoring (NO BRAND SPECIFIED) meter device kit Use to test blood sugar 1 times daily or as directed.  Dispense Accu-chek Olinda Plus or per insurance preference 9/10/20   Roro Chawla MD   blood glucose monitoring (NO BRAND SPECIFIED) meter device kit Use to test blood sugar 1 times daily or as directed. 5/29/20   Roro Chawla MD   lamoTRIgine (LAMICTAL) 150 MG tablet Take 150 mg by mouth daily 5/29/21   Reported, Patient   methocarbamol (ROBAXIN) 500 MG tablet Take 1 tablet (500 mg) by mouth 3 times daily as needed for muscle spasms 4/23/21   Fabiola Chambers MD   nicotine (NICORETTE) 2 MG gum Place 1 each (2 mg) inside cheek as needed for smoking cessation 2/22/20   Roro Chawla MD   omeprazole 20 MG  "tablet TAKE ONE TABLET BY MOUTH TWICE A DAY 4/16/21   Roro Chawla MD   ondansetron (ZOFRAN) 8 MG tablet TAKE ONE-HALF TO ONE TABLET BY MOUTH EVERY 8 HOURS AS NEEDED FOR NAUSEA 5/14/21   Roro Chawla MD   order for DME Please provide oximeter 7/31/20   Megan Grimm MD   order for DME Equipment being ordered: Digital home blood pressure monitor kit 1/7/20   Roro Chawla MD   order for DME Equipment being ordered: Pulse Oxymeter 1/7/20   Roro Chawla MD   predniSONE (DELTASONE) 10 MG tablet -- take 2 tablets twice a day for 3 days then -- take 2 tablets daily for 10 days, -- then take 1 tab daily for 10 days 5/6/21   Roro Chawla MD   prochlorperazine (COMPAZINE) 10 MG tablet Take 1 tablet (10 mg) by mouth every 8 hours as needed for nausea or vomiting 12/11/20   Roro Chawla MD   sucralfate (CARAFATE) 1 GM/10ML suspension Take 10 mLs (1 g) by mouth 4 times daily as needed for nausea (Pain) 5/12/21   Keo Fox PA-C   terbinafine (LAMISIL) 1 % external cream Apply topically 2 times daily 3/9/21   Roro Chawla MD   valACYclovir (VALTREX) 1000 mg tablet Take 2 tablets (2,000 mg) by mouth 2 times daily for 1 day 12/11/20 6/15/21  Roro Chawla MD       Allergies   Allergen Reactions     Codeine Nausea and Vomiting     vomiting     Cyclobenzaprine Nausea     Gabapentin Rash     Hydrocodone Nausea and Vomiting     Sulfa Drugs Nausea and Vomiting     Nausea        REVIEW OF SYSTEMS:   5 point ROS negative except as noted above in HPI, including Gen., Resp., CV, GI &  system review.    PHYSICAL EXAM:   /71   Pulse 86   Resp 22   Ht 1.6 m (5' 3\")   Wt 109.3 kg (241 lb)   LMP 04/15/2016 (Exact Date)   SpO2 96%   BMI 42.69 kg/m   Estimated body mass index is 42.69 kg/m  as calculated from the following:    Height as of this encounter: 1.6 " "m (5' 3\").    Weight as of this encounter: 109.3 kg (241 lb).   GENERAL APPEARANCE: alert, and oriented  MENTAL STATUS: alert, very anxious  AIRWAY EXAM: Mallampatti Class II (visualization of the soft palate, fauces, and uvula)  RESP: lungs clear to auscultation - no rales, rhonchi or wheezes  CV: regular rates and rhythm  Extreme Obesity    DIAGNOSTICS:    Not indicated    IMPRESSION   ASA Class 2 - Mild systemic disease  Epigastric abdominal pain of unclear etiology.    PLAN:   Plan for Gastroscopy with possible biopsy, possible dilation. We discussed the risks, benefits and alternatives and the patient wished to proceed.    The above has been forwarded to the consulting provider.      Signed Electronically by: Darrel Damon MD  June 17, 2021          "

## 2021-06-17 NOTE — ANESTHESIA PREPROCEDURE EVALUATION
"Anesthesia Pre-Procedure Evaluation    Patient: Swetha Patterson   MRN: [id number] : 1970        Preoperative Diagnosis: Epigastric pain [R10.13]   Procedure : Procedure(s):  ESOPHAGOGASTRODUODENOSCOPY (EGD)     Past Medical History:   Diagnosis Date     Anxiety state, unspecified      Asthma      Chronic pain     back pain from cyst     Contact dermatitis and other eczema, due to unspecified cause      COPD (chronic obstructive pulmonary disease) (H)      Depressive disorder, not elsewhere classified      Diabetes (H)      Emphysema with chronic bronchitis (H)      Esophageal reflux      Family history of ischemic heart disease      Fibromyalgia      Gastro-oesophageal reflux disease      History of emphysema      Hoarseness      HTN, goal below 140/90      Hyperlipidemia LDL goal <130      Liver disease     \"fatty liver\"     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)      PONV (postoperative nausea and vomiting)      Rectal bleeding       Past Surgical History:   Procedure Laterality Date     ARTHROSCOPY SHOULDER DECOMPRESSION Left 10/21/2015    Procedure: ARTHROSCOPY SHOULDER DECOMPRESSION;  Surgeon: Julien Milian MD;  [facility]: [facility]     BIOPSY ARTERY TEMPORAL Left 3/11/2020    Procedure: LEFT TEMPORAL ARTERY BIOPSY;  Surgeon: Ibeth Conner MD;  [facility]: [facility]     BRONCHIAL THERMOPLASTY N/A 2014    Procedure: BRONCHIAL THERMOPLASTY;  Surgeon: Ward Whitaker MD;  [facility]     BRONCHIAL THERMOPLASTY N/A 2014    Procedure: BRONCHIAL THERMOPLASTY;  Surgeon: Ward Whitaker MD;  [facility]     BRONCHIAL THERMOPLASTY N/A 2015    Procedure: BRONCHIAL THERMOPLASTY;  Surgeon: Ward Whitaker MD;  [facility]     C APPENDECTOMY  at age 17     COLONOSCOPY N/A 2018    Procedure: COLONOSCOPY ([facility]);  Surgeon: Marshall Oakes MD;  [facility]: [facility]     COLONOSCOPY N/A 10/22/2020    Procedure: Colonoscopy;  Surgeon: Marshall Mccormick MD;  " Location: [facility]     DISCECTOMY, FUSION CERVICAL ANTERIOR ONE LEVEL, COMBINED N/A 5/1/2018    Procedure: COMBINED DISCECTOMY, FUSION CERVICAL ANTERIOR ONE LEVEL;  1.  C5-C6 anterior cervical diskectomy and fusion.    2.  C5-C6 application of intervertebral biomechanical device for interbody fusion purposes.    3.  C5-C6 anterior instrumentation using the standard Kristin InViZia 24 mm plate with four associated bone screws, 12 mm in length.  Inferior screws are fixed.  Superior screws are va     ENT SURGERY  2015    polyps removed from vocal cords      ESOPHAGOSCOPY, GASTROSCOPY, DUODENOSCOPY (EGD), COMBINED N/A 10/22/2020    Procedure: Esophagoscopy, gastroscopy, duodenoscopy with biopsies;  Surgeon: Marshall Mccormick MD;  [facility]: [facility]     EXCISE NODE MEDIASTINAL  4/26/2013    Procedure: EXCISE NODE MEDIASTINAL;;  Surgeon: Av Peña MD;  Location:  OR     LAPAROSCOPIC CHOLECYSTECTOMY N/A 10/19/2017    Procedure: LAPAROSCOPIC CHOLECYSTECTOMY;  LAPAROSCOPIC CHOLECYSTECTOMY;  Surgeon: Ney Jerry MD;  Location:  OR     THORACOSCOPY  4/26/2013    Procedure: THORACOSCOPY;  LEFT VIDEO ASSISTED THORACOSCOPY, RESECTION OF POSTERIOR MEDIASTINAL MASS;  Surgeon: Av Peña MD;  Location:  OR     TONSILLECTOMY  as a kid     TONSILLECTOMY        Allergies   Allergen Reactions     Codeine Nausea and Vomiting     vomiting     Cyclobenzaprine Nausea     Gabapentin Rash     Hydrocodone Nausea and Vomiting     Sulfa Drugs Nausea and Vomiting     Nausea      Social History     Tobacco Use     Smoking status: Current Every Day Smoker     Packs/day: 0.50     Years: 30.00     Pack years: 15.00     Types: Cigarettes     Start date: 1/1/1985     Smokeless tobacco: Never Used     Tobacco comment: Is trying to quit, using nicotine gum and patch   Substance Use Topics     Alcohol use: No     Alcohol/week: 0.0 standard drinks      Wt Readings from Last 1 Encounters:   06/15/21 109.5 kg (241 lb 4.8  oz)        Anesthesia Evaluation   Pt has had prior anesthetic. Type: General.    History of anesthetic complications  - PONV.      ROS/MED HX  ENT/Pulmonary: Comment: Hx of mediastinal cyst s/p bronchial thermoplasties    (+) vocal cord abnormalities -  Hoarseness and Hx of Polyps, tobacco use, Current use, moderate,  COPD,     Neurologic:       Cardiovascular:     (+) Dyslipidemia hypertension--CAD ---Previous cardiac testing   Echo: Date: 1/24/19 Results:  Interpretation Summary  The left ventricle is normal in structure, function and size. The left ventricular ejection fraction is normal. Grade II or moderate diastolic dysfunction. The right ventricle is normal in structure, function and size. No significant valve issues. The study was technically difficult.    Left Ventricle  The left ventricle is normal in structure, function and size. There is normal  left ventricular wall thickness. The left ventricular ejection fraction is  normal. Grade II or moderate diastolic dysfunction. Diastolic Doppler findings  (E/E' ratio and/or other parameters) suggest left ventricular filling  pressures are indeterminate. No regional wall motion abnormalities noted.    Right Ventricle  The right ventricle is normal in structure, function and size.    Atria  Normal left atrial size. Right atrial size is normal. There is no atrial shunt  seen.    Mitral Valve  The mitral valve is normal in structure and function. There is physiologic  mitral regurgitation.      Tricuspid Valve  The tricuspid valve is normal in structure and function. There is physiologic  tricuspid regurgitation.    Aortic Valve  The aortic valve is not well visualized. No aortic regurgitation is present.  No aortic stenosis is present.    Pulmonic Valve  The pulmonic valve is not well visualized.    Vessels  Normal size aorta.    Pericardium  There is no pericardial effusion.    Rhythm  Sinus rhythm was noted.  Stress Test: Date: Results:    ECG Reviewed: Date:  5/12/21 Results:  NSR  Cath: Date: Results:      METS/Exercise Tolerance:     Hematologic:       Musculoskeletal: Comment: S/p C5-6 fusion  (+) arthritis,     GI/Hepatic:     (+) GERD, hepatitis type Other, liver disease (DIAZ),     Renal/Genitourinary:       Endo:     (+) type II DM, Chronic steroid usage for Obesity (Class 3),     Psychiatric/Substance Use:     (+) psychiatric history anxiety, depression and other (comment) (Hx of intentional overdose)  : Hx of opioid abuse.   Infectious Disease:       Malignancy:       Other:      (+) , H/O Chronic Pain (Fibromyalgia),           OUTSIDE LABS:  CBC:   Lab Results   Component Value Date    WBC 10.0 05/22/2021    WBC 12.0 (H) 05/12/2021    HGB 12.4 05/22/2021    HGB 13.8 05/12/2021    HCT 38.9 05/22/2021    HCT 44.0 05/12/2021     05/22/2021     05/12/2021     BMP:   Lab Results   Component Value Date     05/22/2021     05/12/2021    POTASSIUM 3.6 05/22/2021    POTASSIUM 3.8 05/12/2021    CHLORIDE 108 05/22/2021    CHLORIDE 105 05/12/2021    CO2 26 05/22/2021    CO2 26 05/12/2021    BUN 14 05/22/2021    BUN 24 05/12/2021    CR 1.01 05/22/2021    CR 0.98 05/12/2021    GLC 75 05/22/2021    GLC 78 05/12/2021     COAGS:   Lab Results   Component Value Date    INR 0.99 11/20/2020     POC:   Lab Results   Component Value Date    BGM 90 11/21/2020    HCG Negative 10/21/2015    HCGS Negative 05/12/2021     HEPATIC:   Lab Results   Component Value Date    ALBUMIN 3.7 05/22/2021    PROTTOTAL 7.4 05/22/2021    ALT 80 (H) 05/22/2021    AST 21 05/22/2021    ALKPHOS 154 (H) 05/22/2021    BILITOTAL 0.2 05/22/2021     OTHER:   Lab Results   Component Value Date    PH 7.45 11/18/2014    LACT 2.0 11/20/2020    A1C 5.6 10/16/2020    GENE 8.9 05/22/2021    MAG 1.9 03/19/2013    LIPASE 101 05/22/2021    TSH 1.48 05/20/2021    T4 0.80 02/13/2015    CRP 16.0 (H) 02/20/2020    SED 16 02/20/2020       Anesthesia Plan    ASA Status:  3      Anesthesia Type: MAC.      - Reason for MAC: straight local not clinically adequate              Consents    Anesthesia Plan(s) and associated risks, benefits, and realistic alternatives discussed. Questions answered and patient/representative(s) expressed understanding.     - Discussed with:  Patient         Postoperative Care    Pain management: IV analgesics, Multi-modal analgesia.   PONV prophylaxis: Ondansetron (or other 5HT-3)     Comments:                Kevin Parson MD

## 2021-06-17 NOTE — ANESTHESIA CARE TRANSFER NOTE
Patient: Swetha Patterson    Procedure(s):  ESOPHAGOGASTRODUODENOSCOPY, WITH BIOPSY    Diagnosis: Epigastric pain [R10.13]  Diagnosis Additional Information: No value filed.    Anesthesia Type:   MAC     Note:    Oropharynx: oropharynx clear of all foreign objects  Level of Consciousness: drowsy  Oxygen Supplementation: nasal cannula  Level of Supplemental Oxygen (L/min / FiO2): 4  Independent Airway: airway patency satisfactory and stable  Dentition: dentition unchanged  Vital Signs Stable: post-procedure vital signs reviewed and stable  Report to RN Given: handoff report given  Patient transferred to: PACU    Handoff Report: Identifed the Patient, Identified the Reponsible Provider, Reviewed the pertinent medical history, Discussed the surgical course, Reviewed Intra-OP anesthesia mangement and issues during anesthesia, Set expectations for post-procedure period and Allowed opportunity for questions and acknowledgement of understanding      Vitals: (Last set prior to Anesthesia Care Transfer)  CRNA VITALS  6/17/2021 1355 - 6/17/2021 1428      6/17/2021             Resp Rate (set):  10        Electronically Signed By: LEIGH De Oliveira CRNA  June 17, 2021  2:28 PM

## 2021-06-17 NOTE — ANESTHESIA POSTPROCEDURE EVALUATION
Patient: Swetha Patterson    Procedure(s):  ESOPHAGOGASTRODUODENOSCOPY, WITH BIOPSY    Diagnosis:Epigastric pain [R10.13]  Diagnosis Additional Information: No value filed.    Anesthesia Type:  MAC    Note:  Disposition: Outpatient   Postop Pain Control: Uneventful            Sign Out: Well controlled pain   PONV: No   Neuro/Psych: Uneventful            Sign Out: Acceptable/Baseline neuro status   Airway/Respiratory: Uneventful            Sign Out: Acceptable/Baseline resp. status   CV/Hemodynamics: Uneventful            Sign Out: Acceptable CV status   Other NRE:    DID A NON-ROUTINE EVENT OCCUR? No           Last vitals:  Vitals:    06/17/21 1440 06/17/21 1441 06/17/21 1500   BP: 110/81  (!) 119/92   Pulse:   81   Resp: 13 17 24   SpO2: 97% 97%        Last vitals prior to Anesthesia Care Transfer:  CRNA VITALS  6/17/2021 1355 - 6/17/2021 1455      6/17/2021             Resp Rate (set):  10          Electronically Signed By: Mable Rhodes  June 17, 2021  4:01 PM

## 2021-06-22 ENCOUNTER — VIRTUAL VISIT (OUTPATIENT)
Dept: INTERNAL MEDICINE | Facility: CLINIC | Age: 51
End: 2021-06-22
Payer: COMMERCIAL

## 2021-06-22 DIAGNOSIS — E66.01 CLASS 3 SEVERE OBESITY WITH SERIOUS COMORBIDITY AND BODY MASS INDEX (BMI) OF 40.0 TO 44.9 IN ADULT, UNSPECIFIED OBESITY TYPE (H): Primary | ICD-10-CM

## 2021-06-22 DIAGNOSIS — E66.813 CLASS 3 SEVERE OBESITY WITH SERIOUS COMORBIDITY AND BODY MASS INDEX (BMI) OF 40.0 TO 44.9 IN ADULT, UNSPECIFIED OBESITY TYPE (H): Primary | ICD-10-CM

## 2021-06-22 DIAGNOSIS — J44.9 COPD, MODERATE (H): ICD-10-CM

## 2021-06-22 DIAGNOSIS — K21.00 GASTROESOPHAGEAL REFLUX DISEASE WITH ESOPHAGITIS, UNSPECIFIED WHETHER HEMORRHAGE: ICD-10-CM

## 2021-06-22 DIAGNOSIS — K21.00 GASTROESOPHAGEAL REFLUX DISEASE WITH ESOPHAGITIS WITHOUT HEMORRHAGE: ICD-10-CM

## 2021-06-22 DIAGNOSIS — R05.9 COUGH: ICD-10-CM

## 2021-06-22 PROCEDURE — 99214 OFFICE O/P EST MOD 30 MIN: CPT | Mod: 95 | Performed by: INTERNAL MEDICINE

## 2021-06-22 RX ORDER — OMEPRAZOLE 40 MG/1
40 CAPSULE, DELAYED RELEASE ORAL 2 TIMES DAILY
Qty: 30 CAPSULE | Refills: 0 | Status: SHIPPED | OUTPATIENT
Start: 2021-06-22 | End: 2021-06-25

## 2021-06-22 NOTE — PATIENT INSTRUCTIONS
Plan:  1. Increase Omeprazole to 40 mg twice a day for 2 weeks then go back to the prior dose of 20 mg twice a day  2. Continue the other meds, same doses for now.  3. Referral to bariatric clinic  4. You have a lot of appointments scheduled for the next 2 months. Make sure you don't miss them

## 2021-06-22 NOTE — PLAN OF CARE
Stopped lexapro. Feels better without medication. Stressors have resolved in life   VSS. Sitter at bedside. Sleepy but easy to wake up. Did have a couple of snacks. Up to bathroom with stand by assistance, mobility improving. Plan is for social work to meet with patient today and plan for inpatient psychiatry. Denies any other issue, will follow with plan of care.

## 2021-06-22 NOTE — PROGRESS NOTES
Swetha is a 51 year old who is being evaluated via a billable video visit.      How would you like to obtain your AVS? Mail a copy  If the video visit is dropped, the invitation should be resent by: Text to cell phone: [phone]  Will anyone else be joining your video visit? No          This is a VIDEO ( using Doximity)  encounter with the patient.       Location of the provider : office   Location of the patient : home      07:50 --- 08:08            Dr Low's note      Patient's instructions / PLAN:                                                        Plan:  1. Increase Omeprazole to 40 mg twice a day for 2 weeks then go back to the prior dose of 20 mg twice a day  2. Continue the other meds, same doses for now.  3. Referral to bariatric clinic  4. You have a lot of appointments scheduled for the next 2 months. Make sure you don't miss them      ASSESSMENT & PLAN:                                                      (E66.01,  Z68.41) Class 3 severe obesity with serious comorbidity and body mass index (BMI) of 40.0 to 44.9 in adult, unspecified obesity type (H)  (primary encounter diagnosis)  Comment:   Plan: COMPREHENSIVE WEIGHT MANAGEMENT            (J44.9) COPD, moderate (H)  (R05) Cough  Comment:   Plan: Continue same meds, same doses for now     (K21.00) Gastroesophageal reflux disease with esophagitis without hemorrhage  Comment:   Plan: as above        Chief complaint:                                                      Cough     SUBJECTIVE:                                                    History of present illness:       Patient complains of excessive congestion and phlegm following surgery.  -- feels her throat is irritated from EGD  -- pulmonary note June 8 - .reviewed   -- EGD results June 17-- reviewed : severe esophagitis. Pathol result reviewed     Obesity  -- resistant to diets  -- ref to bariatric program     GERD: pt has multiple tests/appointment lined up          Review of Systems:  "                                                     ROS: negative for fever, chills,  wheezes, chest pain, shortness of breath, vomiting, abdominal pain, leg swelling pos for cough       OBJECTIVE:           An actual physical exam can't be done during phone visit   A limited exam can sometimes be performed by video visit   NAD, coughs       PMHx: reviewed  Past Medical History:   Diagnosis Date     Anxiety state, unspecified      Asthma      Chronic pain     back pain from cyst     Contact dermatitis and other eczema, due to unspecified cause      COPD (chronic obstructive pulmonary disease) (H)      Depressive disorder, not elsewhere classified      Diabetes (H)      Emphysema with chronic bronchitis (H)      Esophageal reflux      Family history of ischemic heart disease      Fibromyalgia      Gastro-oesophageal reflux disease      Heart disease     has chest pain sometimes     History of emphysema      Hoarseness      HTN, goal below 140/90      Hyperlipidemia LDL goal <130      Liver disease     \"fatty liver\"     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)      PONV (postoperative nausea and vomiting)      Rectal bleeding       PSHx: reviewed  Past Surgical History:   Procedure Laterality Date     ARTHROSCOPY SHOULDER DECOMPRESSION Left 10/21/2015    Procedure: ARTHROSCOPY SHOULDER DECOMPRESSION;  Surgeon: Julien Milian MD;  Location: [facility]     BIOPSY ARTERY TEMPORAL Left 3/11/2020    Procedure: LEFT TEMPORAL ARTERY BIOPSY;  Surgeon: Ibeth Conner MD;  Location: [facility]     BRONCHIAL THERMOPLASTY N/A 11/14/2014    Procedure: BRONCHIAL THERMOPLASTY;  Surgeon: Ward Whitaker MD;  Location: [facility]     BRONCHIAL THERMOPLASTY N/A 12/19/2014    Procedure: BRONCHIAL THERMOPLASTY;  Surgeon: Ward Whitaker MD;  [facility]     BRONCHIAL THERMOPLASTY N/A 2/6/2015    Procedure: BRONCHIAL THERMOPLASTY;  Surgeon: Ward Whitaker MD;  [facility]: [facility]     C APPENDECTOMY  at age 17     " COLONOSCOPY N/A 11/26/2018    Procedure: COLONOSCOPY ([facility]);  Surgeon: Marshall Oakes MD;  Location:  OR     COLONOSCOPY N/A 10/22/2020    Procedure: Colonoscopy;  Surgeon: Marshall Mccormick MD;  Location:  OR     DISCECTOMY, FUSION CERVICAL ANTERIOR ONE LEVEL, COMBINED N/A 5/1/2018    Procedure: COMBINED DISCECTOMY, FUSION CERVICAL ANTERIOR ONE LEVEL;  1.  C5-C6 anterior cervical diskectomy and fusion.    2.  C5-C6 application of intervertebral biomechanical device for interbody fusion purposes.    3.  C5-C6 anterior instrumentation using the standard Kristin InViZia 24 mm plate with four associated bone screws, 12 mm in length.  Inferior screws are fixed.  Superior screws are va     ENT SURGERY  2015    polyps removed from vocal cords      ESOPHAGOSCOPY, GASTROSCOPY, DUODENOSCOPY (EGD), COMBINED N/A 10/22/2020    Procedure: Esophagoscopy, gastroscopy, duodenoscopy with biopsies;  Surgeon: Marshall Mccormick MD;  Location:  OR     ESOPHAGOSCOPY, GASTROSCOPY, DUODENOSCOPY (EGD), COMBINED N/A 6/17/2021    Procedure: ESOPHAGOGASTRODUODENOSCOPY, WITH BIOPSY;  Surgeon: Darrel Damon MD;  Location:  GI     EXCISE NODE MEDIASTINAL  4/26/2013    Procedure: EXCISE NODE MEDIASTINAL;;  Surgeon: Av Peña MD;  Location:  OR     LAPAROSCOPIC CHOLECYSTECTOMY N/A 10/19/2017    Procedure: LAPAROSCOPIC CHOLECYSTECTOMY;  LAPAROSCOPIC CHOLECYSTECTOMY;  Surgeon: Ney Jerry MD;  Location:  OR     THORACOSCOPY  4/26/2013    Procedure: THORACOSCOPY;  LEFT VIDEO ASSISTED THORACOSCOPY, RESECTION OF POSTERIOR MEDIASTINAL MASS;  Surgeon: Av Peña MD;  Location:  OR     TONSILLECTOMY  as a kid     TONSILLECTOMY          Meds: reviewed  Current Outpatient Medications   Medication Sig Dispense Refill     albuterol (PROVENTIL) (2.5 MG/3ML) 0.083% neb solution Take 1 vial (2.5 mg) by nebulization every 6 hours as needed for shortness of breath / dyspnea or wheezing 25 vial 3      atorvastatin (LIPITOR) 80 MG tablet TAKE ONE TABLET BY MOUTH EVERY DAY 90 tablet 0     benzoyl peroxide (ACNE-CLEAR) 10 % external gel Apply topically 2 times daily as needed       blood glucose (ACCU-CHEK ALLYSON PLUS) test strip USE TO TEST BLOOD SUGAR ONE TIME DAILY OR AS DIRECTED 100 each 0     blood glucose (NO BRAND SPECIFIED) lancets standard Use to test blood sugar 1 times daily or as directed. 100 each 3     blood glucose (NO BRAND SPECIFIED) lancets standard Use to test blood sugar 1 time daily 100 each 1     blood glucose (NO BRAND SPECIFIED) test strip Use to test blood sugar 1 times daily or as directed.  Dispense Accu-chek Allyson Plus or per patient insurance 100 strip 3     blood glucose monitoring (NO BRAND SPECIFIED) meter device kit Use to test blood sugar 1 times daily or as directed.  Dispense Accu-chek Allyson Plus or per insurance preference 1 kit 0     blood glucose monitoring (NO BRAND SPECIFIED) meter device kit Use to test blood sugar 1 times daily or as directed. 1 kit 0     buPROPion (WELLBUTRIN XL) 150 MG 24 hr tablet TAKE ONE TABLET BY MOUTH EVERY MORNING 90 tablet 2     cetirizine HCl 10 MG CAPS Take 1 capsule (10 mg) by mouth daily as needed Takes in the spring. 90 capsule 3     clindamycin 1 % EX external gel Apply topically 2 times daily 60 g 3     clonazePAM (KLONOPIN) 1 MG tablet Take 1 tablet (1 mg) by mouth 2 times daily as needed for anxiety She needs to see her PCP to get more tablets 5 tablet 0     cyclobenzaprine (FLEXERIL) 5 MG tablet Take 1 tablet (5 mg) by mouth 3 times daily as needed for muscle spasms 30 tablet 1     DULoxetine (CYMBALTA) 20 MG capsule Take 1 capsule (20 mg) by mouth daily X 7 days and if well tolerated increase to 40 mg daily. 53 capsule 1     fluticasone (FLONASE) 50 MCG/ACT nasal spray Spray 2 sprays in nostril daily 16 g 5     Fluticasone-Umeclidin-Vilanterol (TRELEGY ELLIPTA) 100-62.5-25 MCG/INH oral inhaler Inhale 1 puff into the lungs daily 28 each 3      furosemide (LASIX) 20 MG tablet TAKE ONE TABLET BY MOUTH EVERY DAY 90 tablet 0     glipiZIDE (GLUCOTROL XL) 2.5 MG 24 hr tablet TAKE ONE TABLET BY MOUTH EVERY DAY 90 tablet 1     hydrocortisone 2.5 % cream APPLY TOPICALLY TO THE AFFECTED AREA(S) TWO TIMES A DAY 30 g 1     hydrOXYzine (ATARAX) 50 MG tablet TAKE TWO TABLETS BY MOUTH THREE TIMES A DAY AS NEEDED FOR ANXIETY MAX 70 TABLETS PER WEEK 70 tablet 5     lamoTRIgine (LAMICTAL) 100 MG tablet 100 mg daily        lamoTRIgine (LAMICTAL) 150 MG tablet Take 150 mg by mouth daily       lisinopril (ZESTRIL) 20 MG tablet TAKE ONE TABLET BY MOUTH EVERY DAY 90 tablet 0     metFORMIN (GLUCOPHAGE) 500 MG tablet TAKE ONE TABLET BY MOUTH EVERY DAY WITH BREAKFAST 90 tablet 1     methocarbamol (ROBAXIN) 500 MG tablet Take 1 tablet (500 mg) by mouth 3 times daily as needed for muscle spasms 120 tablet 0     montelukast (SINGULAIR) 10 MG tablet Take 1 tablet (10 mg) by mouth At Bedtime 90 tablet 4     nicotine (NICORETTE) 2 MG gum Place 1 each (2 mg) inside cheek as needed for smoking cessation 150 each 1     nitroGLYcerin (NITROSTAT) 0.4 MG sublingual tablet PLACE ONE TABLET UNDER THE TONGUE AT THE 1ST SIGN OF ATTACK. IF PAIN IS UNRELIEVED OR WORSENED 5 MINUTES AFTER 1ST DOSE, PROMPT MEDICAL ASSISTANCE IS NEEDED. MAY REPEAT EVERY 5 MINUTES UNTIL PAIN IS RELIEVED. MAX OF THREE DOSES 25 tablet 3     nystatin (MYCOSTATIN) 161539 UNIT/GM external powder APPLY TOPICALLY TWO TIMES A DAY AS NEEDED FOR SKIN RASH 45 g 0     omeprazole (PRILOSEC) 20 MG DR capsule TAKE ONE CAPSULE BY MOUTH TWO TIMES A DAY       omeprazole 20 MG tablet TAKE ONE TABLET BY MOUTH TWICE A DAY 60 tablet 0     ondansetron (ZOFRAN ODT) 4 MG ODT tab Take 1 tablet (4 mg) by mouth every 6 hours as needed for nausea or vomiting 12 tablet 0     ondansetron (ZOFRAN) 8 MG tablet TAKE ONE-HALF TO ONE TABLET BY MOUTH EVERY 8 HOURS AS NEEDED FOR NAUSEA 30 tablet 1     order for DME Please provide oximeter 1 Device 0      order for DME Equipment being ordered: Digital home blood pressure monitor kit 1 Device 0     order for DME Equipment being ordered: Pulse Oxymeter 1 Device 0     polyethylene glycol (MIRALAX) 17 GM/Dose powder Take 17 g (1 capful) by mouth daily 507 g 1     predniSONE (DELTASONE) 10 MG tablet -- take 2 tablets twice a day for 3 days then -- take 2 tablets daily for 10 days, -- then take 1 tab daily for 10 days 42 tablet 0     prochlorperazine (COMPAZINE) 10 MG tablet Take 1 tablet (10 mg) by mouth every 8 hours as needed for nausea or vomiting 30 tablet 0     sucralfate (CARAFATE) 1 GM tablet Take 1 tablet (1 g) by mouth 4 times daily 120 tablet 1     sucralfate (CARAFATE) 1 GM/10ML suspension Take 10 mLs (1 g) by mouth 4 times daily as needed for nausea (Pain) 420 mL 0     sulfamethoxazole-trimethoprim (BACTRIM DS) 800-160 MG tablet Take 1 tablet by mouth 2 times daily Take one tablet twice daily. For additional refills, please schedule a follow-up appointment. 180 tablet 1     terbinafine (LAMISIL) 1 % external cream Apply topically 2 times daily 30 g 2     valACYclovir (VALTREX) 1000 mg tablet Take 2 tablets (2,000 mg) by mouth 2 times daily for 1 day 4 tablet 0     VENTOLIN  (90 Base) MCG/ACT inhaler INHALE TWO PUFFS BY MOUTH EVERY 6 HOURS AS NEEDED FOR SHORTNESS OF BREATH 18 g 1       Soc Hx: reviewed  Fam Hx: reviewed          Roro Low MD  Internal Medicine

## 2021-06-23 ENCOUNTER — TELEPHONE (OUTPATIENT)
Dept: INTERNAL MEDICINE | Facility: CLINIC | Age: 51
End: 2021-06-23

## 2021-06-23 DIAGNOSIS — B37.0 ORAL THRUSH: Primary | ICD-10-CM

## 2021-06-23 NOTE — TELEPHONE ENCOUNTER
Patient calling and she thinks she has oral thrush or yeast on her tongue. She has had this for a few days. Her tongue is white. Please advise. Ok to all and nettie [phone]

## 2021-06-24 ENCOUNTER — TELEPHONE (OUTPATIENT)
Dept: INTERNAL MEDICINE | Facility: CLINIC | Age: 51
End: 2021-06-24

## 2021-06-24 RX ORDER — NYSTATIN 100000/ML
500000 SUSPENSION, ORAL (FINAL DOSE FORM) ORAL 4 TIMES DAILY
Qty: 120 ML | Refills: 0 | Status: SHIPPED | OUTPATIENT
Start: 2021-06-24 | End: 2021-08-12

## 2021-06-24 NOTE — RESULT ENCOUNTER NOTE
Assessment: A reactive gastropathy is a non specific finding. Although architectural changes are noted in the cells of the stomach lining, no active inflammation is present. It can be caused by medications (usually aspirin or NSAIDs), alcohol and intestinal contents that reflux back into the stomach (usually in the setting of post surgical anatomy). Treatment is focused on removing the offending agent (if known), reducing stomach acidity (with histamine type II blockers or proton pump inhibitors) or protecting the gastric mucosa (with agents like Pepto-Bismol , Carafate and Misoprostol).     Recommendations:* Await special stains as noted above. Follow up with primary caregiver. If dysphagia persists, consider either a barium esophagram with marshmallow or barium pill bolus, esophageal motility study or both.

## 2021-06-24 NOTE — TELEPHONE ENCOUNTER
Pt calling with c/o oral thrush.  States tongue is covered by white matter and says throat feels irritated.  She has had this before and wondering if can get Rx.    Can you do this for her?  Or do you want her to be seen?

## 2021-06-24 NOTE — TELEPHONE ENCOUNTER
Form not signed  I do not remember prescribing a walker for her  I do not remember her having a walker when she came for appointments  I am not aware of the diagnosis that she needs a walker

## 2021-06-24 NOTE — TELEPHONE ENCOUNTER
Fax received from [facility] for review and signature.  Put in Dr. Low's in basket.

## 2021-06-25 DIAGNOSIS — R60.0 BILATERAL LEG EDEMA: ICD-10-CM

## 2021-06-25 DIAGNOSIS — K21.00 GASTROESOPHAGEAL REFLUX DISEASE WITH ESOPHAGITIS WITHOUT HEMORRHAGE: ICD-10-CM

## 2021-06-25 RX ORDER — OMEPRAZOLE 40 MG/1
CAPSULE, DELAYED RELEASE ORAL
Qty: 30 CAPSULE | Refills: 0 | Status: SHIPPED | OUTPATIENT
Start: 2021-06-25 | End: 2021-07-16

## 2021-06-25 RX ORDER — FUROSEMIDE 20 MG
TABLET ORAL
Qty: 90 TABLET | Refills: 0 | Status: SHIPPED | OUTPATIENT
Start: 2021-06-25 | End: 2021-07-27

## 2021-06-25 NOTE — TELEPHONE ENCOUNTER
Pending Prescriptions:                       Disp   Refills    omeprazole (PRILOSEC) 40 MG DR capsule [P*30 cap*0            Sig: TAKE ONE CAPSULE BY MOUTH TWICE A DAY    furosemide (LASIX) 20 MG tablet [Pharmacy*90 tab*0            Sig: TAKE ONE TABLET BY MOUTH EVERY DAY    Routing refill request to provider for review/approval because:  Two doses of omeprazole on file  BP Readings from Last 3 Encounters:   06/17/21 (!) 119/92   06/15/21 110/67   06/08/21 109/74

## 2021-06-25 NOTE — TELEPHONE ENCOUNTER
"Requested Prescriptions   Pending Prescriptions Disp Refills     omeprazole (PRILOSEC) 40 MG DR capsule [Pharmacy Med Name: OMEPRAZOLE 40MG CPDR] 30 capsule 0     Sig: TAKE ONE CAPSULE BY MOUTH TWICE A DAY       PPI Protocol Passed - 6/25/2021 11:54 AM        Passed - Not on Clopidogrel (unless Pantoprazole ordered)        Passed - No diagnosis of osteoporosis on record        Passed - Recent (12 mo) or future (30 days) visit within the authorizing provider's specialty     Patient has had an office visit with the authorizing provider or a provider within the authorizing providers department within the previous 12 mos or has a future within next 30 days. See \"Patient Info\" tab in inbasket, or \"Choose Columns\" in Meds & Orders section of the refill encounter.              Passed - Medication is active on med list        Passed - Patient is age 18 or older        Passed - No active pregnacy on record        Passed - No positive pregnancy test in past 12 months           furosemide (LASIX) 20 MG tablet [Pharmacy Med Name: FUROSEMIDE 20MG TABS] 90 tablet 0     Sig: TAKE ONE TABLET BY MOUTH EVERY DAY       Diuretics (Including Combos) Protocol Failed - 6/25/2021 11:54 AM        Failed - Blood pressure under 140/90 in past 12 months     BP Readings from Last 3 Encounters:   06/17/21 (!) 119/92   06/15/21 110/67   06/08/21 109/74                 Passed - Recent (12 mo) or future (30 days) visit within the authorizing provider's specialty     Patient has had an office visit with the authorizing provider or a provider within the authorizing providers department within the previous 12 mos or has a future within next 30 days. See \"Patient Info\" tab in inbasket, or \"Choose Columns\" in Meds & Orders section of the refill encounter.              Passed - Medication is active on med list        Passed - Patient is age 18 or older        Passed - No active pregancy on record        Passed - Normal serum creatinine on file in past 12 " months     Recent Labs   Lab Test 05/22/21 0151   CR 1.01              Passed - Normal serum potassium on file in past 12 months     Recent Labs   Lab Test 05/22/21 0151   POTASSIUM 3.6                    Passed - Normal serum sodium on file in past 12 months     Recent Labs   Lab Test 05/22/21 0151                 Passed - No positive pregnancy test in past 12 months

## 2021-06-29 ENCOUNTER — TELEPHONE (OUTPATIENT)
Dept: GASTROENTEROLOGY | Facility: CLINIC | Age: 51
End: 2021-06-29

## 2021-06-29 LAB — COPATH REPORT: NORMAL

## 2021-06-29 NOTE — TELEPHONE ENCOUNTER
[facility]    Phone Message    May a detailed message be left on voicemail: yes     Reason for Call: Requesting Results   Name/type of test: ESOPHAGOGASTRODUODENOSCOPY, WITH BIOPSY    Date of test: 6/17/2021    Was test done at a location other than [facility] (Please fill in the location if not [facility])?: [place]    Swetha is calling requesting someone call her back with the results from the biopsies taken.     Action Taken: Message routed to:  [facility] gastro    Travel Screening: Not Applicable

## 2021-06-30 ENCOUNTER — TELEPHONE (OUTPATIENT)
Dept: GASTROENTEROLOGY | Facility: CLINIC | Age: 51
End: 2021-06-30

## 2021-06-30 NOTE — TELEPHONE ENCOUNTER
LPN returned call to patient and notified her that she would need to call Dr. Damon at [facility] as his clinic is not based out of our GI office. Phone number provided to patient. Patient verbalized understanding and had no further questions.     Prachi Ignacio LPN

## 2021-06-30 NOTE — TELEPHONE ENCOUNTER
[facility] Call Center    Phone Message    May a detailed message be left on voicemail: yes     Reason for Call: Patient returning a phone call from [place]. Please advise. Thank you    Action Taken: Message routed to:  Adult Clinics: Gastroenterology (GI) p [id number]    Travel Screening: Not Applicable

## 2021-06-30 NOTE — TELEPHONE ENCOUNTER
The patient called back regarding message below. Writer relayed information but patient was getting another call so unable to give phone number. Please advise. Thank you.

## 2021-06-30 NOTE — TELEPHONE ENCOUNTER
LPN left message for patient requesting a return call to discuss message. Patient had EGD done by Dr. Darrel Damon, but has not been seen by [facility] GI providers. Patient will need to contact [facility] in [place] at [phone].    Prachi Ignacio LPN

## 2021-07-01 ENCOUNTER — TELEPHONE (OUTPATIENT)
Dept: INTERNAL MEDICINE | Facility: CLINIC | Age: 51
End: 2021-07-01

## 2021-07-01 DIAGNOSIS — R10.13 EPIGASTRIC PAIN: Primary | ICD-10-CM

## 2021-07-01 NOTE — TELEPHONE ENCOUNTER
Patient calling to discuss the results of the upper endoscopy she had done on 6/17/21 and the symptoms she is still experiencing. Call her to discuss at [phone] (stmk)

## 2021-07-05 NOTE — TELEPHONE ENCOUNTER
Reflux     I made referral to see Gastro ( 5/25 ) so she can talk about the pain and the results of the tests )

## 2021-07-06 NOTE — TELEPHONE ENCOUNTER
Patient calling. Message given.. patient wants to see a surgeon to have this fixed. She stated getting into the [facility] gastro will take a long time and she is in a lot of pain. Please advise. Ok to call and nettie [phone]

## 2021-07-07 NOTE — TELEPHONE ENCOUNTER
Please see message below.  Thanks    Patient wanting referral to general surgeon.   Referral pended.  Please sign if appropriate.

## 2021-07-08 NOTE — TELEPHONE ENCOUNTER
Patient was calling to check in on referral for surgery as she's in a ton of pain. I told patient that it looks like there is a pending referral placed for [facility]. She has the contact information and is aware of why the future order is still pending.     Richard Leach

## 2021-07-09 DIAGNOSIS — R11.0 NAUSEA: ICD-10-CM

## 2021-07-09 RX ORDER — ONDANSETRON 8 MG/1
TABLET, FILM COATED ORAL
Qty: 30 TABLET | Refills: 3 | Status: SHIPPED | OUTPATIENT
Start: 2021-07-09 | End: 2021-11-01

## 2021-07-09 NOTE — TELEPHONE ENCOUNTER
Pending Prescriptions:                       Disp   Refills    ondansetron (ZOFRAN) 8 MG tablet [Pharmac*30 tab*3            Sig: TAKE ONE-HALF TO ONE TABLET BY MOUTH EVERY 8           HOURS AS NEEDED FOR NAUSEA    Prescription approved per [facility] Refill Protocol.

## 2021-07-13 ENCOUNTER — OFFICE VISIT (OUTPATIENT)
Dept: SURGERY | Facility: CLINIC | Age: 51
End: 2021-07-13
Payer: COMMERCIAL

## 2021-07-13 ENCOUNTER — TELEPHONE (OUTPATIENT)
Dept: INTERNAL MEDICINE | Facility: CLINIC | Age: 51
End: 2021-07-13

## 2021-07-13 VITALS
BODY MASS INDEX: 42.52 KG/M2 | SYSTOLIC BLOOD PRESSURE: 110 MMHG | WEIGHT: 240 LBS | HEART RATE: 97 BPM | OXYGEN SATURATION: 97 % | HEIGHT: 63 IN | DIASTOLIC BLOOD PRESSURE: 64 MMHG

## 2021-07-13 DIAGNOSIS — Z72.0 TOBACCO ABUSE: Primary | ICD-10-CM

## 2021-07-13 DIAGNOSIS — K21.9 GASTROESOPHAGEAL REFLUX DISEASE WITHOUT ESOPHAGITIS: Primary | ICD-10-CM

## 2021-07-13 PROCEDURE — 99204 OFFICE O/P NEW MOD 45 MIN: CPT | Performed by: SURGERY

## 2021-07-13 ASSESSMENT — MIFFLIN-ST. JEOR: SCORE: 1667.76

## 2021-07-13 NOTE — TELEPHONE ENCOUNTER
Patient calling and she wants to stop smoking but needs a rx to help her. Patient uses [facility] in [place]. Please advise. Ok to call and  [phone]

## 2021-07-14 DIAGNOSIS — K44.9 HIATAL HERNIA: Primary | ICD-10-CM

## 2021-07-15 ENCOUNTER — HOSPITAL ENCOUNTER (OUTPATIENT)
Dept: GENERAL RADIOLOGY | Facility: CLINIC | Age: 51
Discharge: HOME | End: 2021-07-15
Attending: SURGERY | Admitting: SURGERY
Payer: COMMERCIAL

## 2021-07-15 DIAGNOSIS — K44.9 HIATAL HERNIA: ICD-10-CM

## 2021-07-15 PROCEDURE — 74240 X-RAY XM UPR GI TRC 1CNTRST: CPT

## 2021-07-15 PROCEDURE — 255N000001 HC RX 255: Performed by: SURGERY

## 2021-07-15 RX ADMIN — ANTACID/ANTIFLATULENT 4 G: 380; 550; 10; 10 GRANULE, EFFERVESCENT ORAL at 09:54

## 2021-07-15 NOTE — TELEPHONE ENCOUNTER
Patient states she has tried Chantix and it did seem to work.    Patient is also asking about a diuretic to help loose weight for surgery.

## 2021-07-15 NOTE — PROGRESS NOTES
[facility]  Surgery Consultation    HPI: Patient is a 51 year old female who is here for consultation requested by Roro Chawla [phone] for evaluation of ongoing abdominal pain and recent finding of small sliding hiatal hernia.  The patient describes having chronic upper abdominal discomfort as well as persistent regurgitation and dysphagia.  She describes having the symptoms for at least 1 to 2 years.  She feels the symptoms are unbearable at this time.  She recently had an upper endoscopy on 6/17 which revealed a very small sliding hiatal hernia.  She has been referred to our office for ongoing management.  The patient states her weight has been stable with a BMI of approximately 42.  She states she is unable to lose weight due to the inability to exercise due to chronic lung disease.  She continues to smoke up to a pack a day but is actively trying to quit.  Patient was extremely frustrated at the beginning and end of our consultation due to being told that a simple surgery would fix her chronic symptoms.  She is very emotional and frustrated during our encounter. Patient denies fevers, chills, nausea, vomiting, SOB, chest pain, abdominal pain.    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  PSH:    has a past surgical history that includes APPENDECTOMY (at age 17); Excise Node Mediastinal  "(4/26/2013); Bronchial Thermoplasty (N/A, 11/14/2014); Bronchial Thermoplasty (N/A, 12/19/2014); Bronchial Thermoplasty (N/A, 2/6/2015); Arthroscopy shoulder decompression (Left, 10/21/2015); Laparoscopic cholecystectomy (N/A, 10/19/2017); Discectomy, fusion cervical anterior one level, combined (N/A, 5/1/2018); Colonoscopy (N/A, 11/26/2018); Thoracoscopy (4/26/2013); tonsillectomy (as a kid); Tonsillectomy; ENT surgery (2015); Biopsy artery temporal (Left, 3/11/2020); Esophagoscopy, gastroscopy, duodenoscopy (EGD), combined (N/A, 10/22/2020); Colonoscopy (N/A, 10/22/2020); and Esophagoscopy, gastroscopy, duodenoscopy (EGD), combined (N/A, 6/17/2021).  Social History:   reports that she has been smoking cigarettes. She started smoking about 36 years ago. She has a 15.00 pack-year smoking history. She has never used smokeless tobacco. She reports that she does not drink alcohol and does not use drugs.  Family History:family history includes Asthma in her maternal grandfather, mother, and sister; Cerebrovascular Disease in her maternal grandfather, maternal grandmother, mother, and sister; Depression in her mother; Diabetes in her maternal grandfather; Family History Negative in her brother and father; Gallbladder Disease in her mother; Heart Disease in her maternal grandfather and mother; Hypertension in her maternal grandfather, maternal grandmother, mother, and sister.  Medications/Allergies: Home medications and allergies reviewed.    ROS:  The 10 point Review of Systems is negative other than noted in the HPI.    Physical Exam:  /64 (BP Location: Left arm, Patient Position: Sitting, Cuff Size: Adult Large)   Pulse 97   Ht 1.6 m (5' 3\")   Wt 108.9 kg (240 lb)   LMP 04/15/2016 (Exact Date)   SpO2 97%   BMI 42.51 kg/m    GENERAL: Generally appears well.  Psych: Alert and Oriented.  Normal affect  Eyes: Sclera clear  Respiratory:  Lungs with good air excursion  Cardiovascular:  Normal peripheral " pulses  GI: Abdomen Obese/ Soft Non-Tender entire abdomen No hernias palpated..  Lymphatic/Hematologic/Immune:  No obvious lymphadenopathy.  Integumentary:  No rashes  Neurological: grossly intact      All new lab and imaging data was reviewed.     Impression and Plan:  Patient is a 51 year old female with abdominal pain    PLAN:   I have personally reviewed all imaging, EGD findings, and previous notes.  The patient has chronic symptoms dating over a few years.  Her CT of her chest/abdomen/pelvis did not reveal a hernia.  Her EGD showed a very small sliding hiatal hernia.  I described that small sliding hiatal hernias typically would present as chronic reflux uncontrolled with medications.  The patient has multiple complaints with dysphagia, food getting stuck in the lower esophagus, and abdominal pain.  I do not think a very small sliding hiatal hernia would account for all the symptoms.  The patient was very upset and frustrated after being told that she was not a good candidate for surgery and that I did not think her complaints would be solved with a small sliding hiatal hernia repair.   I counseled her on weight loss which could reduce or eliminate all symptoms.  She states she is unable to lose weight and was not interested in bariatric referral at this time.  I also offered the patient further work-up to help investigate her symptoms to define a exact etiology which could include an upper GI, manometry, or pH probe for further delineation.  After some hesitation she has agreed to go forward with upper GI today.  My strong recommendation would be for her to lose weight by any means available and to follow symptoms.  I again reiterated that the findings that I have reviewed would not account for her complaints and that surgery would be risky with high complication/failure rate if it was performed at this time given her BMI >42 and nicotine dependence. The patient again was upset but was willing to undergo  further work-up.  I will call her with the results.      Thank you very much for this consult.    TIME SPENT:  50 minutes was spent on the date of this encounter doing chart and test result review, patient visit including physical exam, patient education, developing plan of care, and documenting.     Olivier Campos M.D.  [facility]  [phone]    Please route or send letter to:  Primary Care Provider (PCP) and Referring Provider

## 2021-07-16 ENCOUNTER — TELEPHONE (OUTPATIENT)
Dept: SURGERY | Facility: CLINIC | Age: 51
End: 2021-07-16

## 2021-07-16 DIAGNOSIS — K21.00 GASTROESOPHAGEAL REFLUX DISEASE WITH ESOPHAGITIS WITHOUT HEMORRHAGE: ICD-10-CM

## 2021-07-16 RX ORDER — OMEPRAZOLE 40 MG/1
CAPSULE, DELAYED RELEASE ORAL
Qty: 180 CAPSULE | Refills: 0 | Status: SHIPPED | OUTPATIENT
Start: 2021-07-16 | End: 2022-02-16

## 2021-07-16 NOTE — TELEPHONE ENCOUNTER
Pending Prescriptions:                       Disp   Refills    omeprazole (PRILOSEC) 40 MG DR capsule [Ph*30 cap*0        Sig: TAKE ONE CAPSULE BY MOUTH TWICE A DAY    Two doses on file. Please advise

## 2021-07-16 NOTE — TELEPHONE ENCOUNTER
Spoke to patient regarding UGI. Shows very small hiatal hernia. I do not think this is causing her ongoing complaints a this time. I encouraged her to continue her work on weight loss and smoking cessation. She agreed. She has also agreed to see bariatric surgery and will schedule an appointment in the near future.

## 2021-07-21 ENCOUNTER — VIRTUAL VISIT (OUTPATIENT)
Dept: GASTROENTEROLOGY | Facility: CLINIC | Age: 51
End: 2021-07-21
Attending: INTERNAL MEDICINE
Payer: COMMERCIAL

## 2021-07-21 DIAGNOSIS — R10.13 EPIGASTRIC PAIN: Primary | ICD-10-CM

## 2021-07-21 PROCEDURE — 99204 OFFICE O/P NEW MOD 45 MIN: CPT | Mod: 95 | Performed by: INTERNAL MEDICINE

## 2021-07-21 RX ORDER — DICYCLOMINE HCL 20 MG
20 TABLET ORAL 3 TIMES DAILY PRN
Qty: 90 TABLET | Refills: 3 | Status: SHIPPED | OUTPATIENT
Start: 2021-07-21 | End: 2021-10-25

## 2021-07-21 NOTE — PROGRESS NOTES
Swetha is a 51 year old who is being evaluated via a billable video visit.      How would you like to obtain your AVS? MyChart  If the video visit is dropped, the invitation should be resent by: Text to cell phone: [phone] (  Will anyone else be joining your video visit? No      Video Start Time:   Video-Visit Details    Type of service:  Video Visit    Video End Time:    Originating Location (pt. Location):     Distant Location (provider location):  [facility]     Platform used for Video Visit:   Rodriguez Belle CMA

## 2021-07-21 NOTE — PROGRESS NOTES
HPI:    Sarah  presents today for a video visit to discuss abdominal pain which has been going on for at least a year (however, per chart review maybe more of a longstanding issue).  Has multiple symptoms including episodes of severe nausea, often accompanied by sweating and sometimes vomiting up of yellow material.  Also reports debilitating abdominal pain and early satiety to the point that she no longer wants to eat.  Also feels like foods get caught in her hiatal hernia.  Has had work-up of this including an EGD which was notable for mild esophagitis/gastritis.  Was referred to surgery to discuss repair of small hiatal hernia - upper GI was normal aside from a small sliding hernia and given concern that it wasn't the cause of her symptoms, surgery was deferred. She was referred to see the bariatric surgeon to discuss potential weight loss surgery which she is eager to have done.  Has tried multiple medications for this - is on BID omeprazole and carafate with minimal improvement.  Is on zofran as needed - unsure if it helped her.  Has also been using ibuprofen 800mg with minimal improvement.    Because of her symptoms has switched to eating softer foods - for the last week has been mainly eating soups and yogurt - tried eating a hamburger yesterday but had to stop after a few bites because of pain and feeling full.    BMs alternate between diarrhea and constipation - when she gets constipated she uses miralax with good control of her symptoms.    Reports her blood sugars have been under better control lately - in the 140s with a few readings in the 200s.    Is eager to quit smoking - recently started chantix.      Past Medical History:   Diagnosis Date     Anxiety state, unspecified      Asthma      Chronic pain     back pain from cyst     Contact dermatitis and other eczema, due to unspecified cause      COPD (chronic obstructive pulmonary disease) (H)      Depressive disorder, not elsewhere  "classified      Diabetes (H)      Emphysema with chronic bronchitis (H)      Esophageal reflux      Family history of ischemic heart disease      Fibromyalgia      Gastro-oesophageal reflux disease      Heart disease     has chest pain sometimes     History of emphysema      Hoarseness      HTN, goal below 140/90      Hyperlipidemia LDL goal <130      Liver disease     \"fatty liver\"     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)      PONV (postoperative nausea and vomiting)      Rectal bleeding        Past Surgical History:   Procedure Laterality Date     ARTHROSCOPY SHOULDER DECOMPRESSION Left 10/21/2015    Procedure: ARTHROSCOPY SHOULDER DECOMPRESSION;  Surgeon: Julien Milian MD;  Location: [facility] OR     BIOPSY ARTERY TEMPORAL Left 3/11/2020    Procedure: LEFT TEMPORAL ARTERY BIOPSY;  Surgeon: Ibeth Conner MD;  Location: [facility] OR     BRONCHIAL THERMOPLASTY N/A 11/14/2014    Procedure: BRONCHIAL THERMOPLASTY;  Surgeon: Ward Whitaker MD;  [facility]     BRONCHIAL THERMOPLASTY N/A 12/19/2014    Procedure: BRONCHIAL THERMOPLASTY;  Surgeon: Ward Whitaker MD;  [facility]     BRONCHIAL THERMOPLASTY N/A 2/6/2015    Procedure: BRONCHIAL THERMOPLASTY;  Surgeon: Ward Whitaker MD;  [facility]     C APPENDECTOMY  at age 17     COLONOSCOPY N/A 11/26/2018    Procedure: COLONOSCOPY ([facility]);  Surgeon: Marshall Oakes MD;  Location: [facility] OR     COLONOSCOPY N/A 10/22/2020    Procedure: Colonoscopy;  Surgeon: Marshall Mccormick MD;  Location: [facility] OR     DISCECTOMY, FUSION CERVICAL ANTERIOR ONE LEVEL, COMBINED N/A 5/1/2018    Procedure: COMBINED DISCECTOMY, FUSION CERVICAL ANTERIOR ONE LEVEL;  1.  C5-C6 anterior cervical diskectomy and fusion.    2.  C5-C6 application of intervertebral biomechanical device for interbody fusion purposes.    3.  C5-C6 anterior instrumentation using the standard Kristin InViZia 24 mm plate with four associated bone screws, 12 mm in length.  Inferior screws " are fixed.  Superior screws are va     ENT SURGERY  2015    polyps removed from vocal cords      ESOPHAGOSCOPY, GASTROSCOPY, DUODENOSCOPY (EGD), COMBINED N/A 10/22/2020    Procedure: Esophagoscopy, gastroscopy, duodenoscopy with biopsies;  Surgeon: Marshall Mccormick MD;  Location:  OR     ESOPHAGOSCOPY, GASTROSCOPY, DUODENOSCOPY (EGD), COMBINED N/A 6/17/2021    Procedure: ESOPHAGOGASTRODUODENOSCOPY, WITH BIOPSY;  Surgeon: Darrel Damon MD;  Location:  GI     EXCISE NODE MEDIASTINAL  4/26/2013    Procedure: EXCISE NODE MEDIASTINAL;;  Surgeon: Av Peña MD;  Location:  OR     LAPAROSCOPIC CHOLECYSTECTOMY N/A 10/19/2017    Procedure: LAPAROSCOPIC CHOLECYSTECTOMY;  LAPAROSCOPIC CHOLECYSTECTOMY;  Surgeon: Ney Jerry MD;  Location:  OR     THORACOSCOPY  4/26/2013    Procedure: THORACOSCOPY;  LEFT VIDEO ASSISTED THORACOSCOPY, RESECTION OF POSTERIOR MEDIASTINAL MASS;  Surgeon: Av Peña MD;  Location:  OR     TONSILLECTOMY  as a kid     TONSILLECTOMY         Family History   Problem Relation Age of Onset     Heart Disease Mother         murmur, mi     Hypertension Mother      Cerebrovascular Disease Mother      Depression Mother      Gallbladder Disease Mother      Asthma Mother      Family History Negative Father         does not know  him     Family History Negative Brother      Heart Disease Maternal Grandfather      Asthma Maternal Grandfather      Hypertension Maternal Grandfather      Cerebrovascular Disease Maternal Grandfather      Diabetes Maternal Grandfather      Asthma Sister      Hypertension Sister      Cerebrovascular Disease Sister      Hypertension Maternal Grandmother      Cerebrovascular Disease Maternal Grandmother        Social History     Tobacco Use     Smoking status: Current Every Day Smoker     Packs/day: 0.50     Years: 30.00     Pack years: 15.00     Types: Cigarettes     Start date: 1/1/1985     Smokeless tobacco: Never Used     Tobacco comment:  Is trying to quit, using nicotine gum and patch   Substance Use Topics     Alcohol use: No     Alcohol/week: 0.0 standard drinks        O:    Gen: no acute distress  HEENT: NCAT  Neck: normal ROM  Resp: nonlabored breathing  Neuro: no gross deficits  Psych: appropriate mood and affect    Assessment and Plan:    # abdominal pain, early satiety - EGD unremarkable aside from mild gastritis/esophagitis. CT and upper GI series also unremarkable.  Minimal improvement on PPI and carafate although she does continue to smoke and use NSAIDs and I have strongly advised her to quit both.  Doubt that small hiatal hernia is causing her symptoms and this was discussed with patient at length today. Will get gastric emptying scan to assess for gastroparesis. Also encouraged to try to eat smaller more frequent meals and snacks throughout the day and to not lay down within a few hours of eating.  Will give trial of bentyl for abdominal pain/cramping.  Agree with plan to establish care with bariatric surgery.    RTC 3 months    Lisa Harris MD     Video-Visit Details     Type of service:  Video Visit     Video Start Time: 2:01 PM  Video End Time (time video stopped): 2:23 PM    Originating Location (pt. Location): bed     Distant Location (provider location):  [facility]      Mode of Communication:  Video Conference via Passion[employer]

## 2021-07-26 ENCOUNTER — TELEPHONE (OUTPATIENT)
Dept: INTERNAL MEDICINE | Facility: CLINIC | Age: 51
End: 2021-07-26

## 2021-07-26 DIAGNOSIS — F17.219 CIGARETTE NICOTINE DEPENDENCE WITH NICOTINE-INDUCED DISORDER: Primary | ICD-10-CM

## 2021-07-26 DIAGNOSIS — R60.0 BILATERAL LEG EDEMA: ICD-10-CM

## 2021-07-26 NOTE — TELEPHONE ENCOUNTER
Patient calls requesting an increase on Lasix to two per day.  One in am and one in pm.  Bother her pharmacist and Homecare nurse suggested it because she is still retaining a lot of fluid. Please send new RX.    Patient is also requesting Nicoderm Gum (higher dose) to use in between taking her Chantix.  She is committed to quit smoking in order to have Gastric Bypass.

## 2021-07-27 RX ORDER — FUROSEMIDE 20 MG
20 TABLET ORAL 2 TIMES DAILY
Qty: 180 TABLET | Refills: 0 | Status: SHIPPED | OUTPATIENT
Start: 2021-07-27 | End: 2022-02-11

## 2021-07-30 ENCOUNTER — MEDICAL CORRESPONDENCE (OUTPATIENT)
Dept: HEALTH INFORMATION MANAGEMENT | Facility: CLINIC | Age: 51
End: 2021-07-30

## 2021-07-30 ENCOUNTER — HOSPITAL ENCOUNTER (OUTPATIENT)
Dept: CARDIOLOGY | Facility: CLINIC | Age: 51
Discharge: HOME | End: 2021-07-30
Attending: INTERNAL MEDICINE | Admitting: INTERNAL MEDICINE
Payer: COMMERCIAL

## 2021-07-30 DIAGNOSIS — R06.09 DYSPNEA ON EXERTION: ICD-10-CM

## 2021-07-30 LAB — LVEF ECHO: NORMAL

## 2021-07-30 PROCEDURE — 93306 TTE W/DOPPLER COMPLETE: CPT

## 2021-07-30 PROCEDURE — 93306 TTE W/DOPPLER COMPLETE: CPT | Mod: 26 | Performed by: INTERNAL MEDICINE

## 2021-08-03 ENCOUNTER — TELEPHONE (OUTPATIENT)
Dept: SURGERY | Facility: CLINIC | Age: 51
End: 2021-08-03

## 2021-08-03 ENCOUNTER — TELEPHONE (OUTPATIENT)
Dept: INTERNAL MEDICINE | Facility: CLINIC | Age: 51
End: 2021-08-03

## 2021-08-03 DIAGNOSIS — F17.219 CIGARETTE NICOTINE DEPENDENCE WITH NICOTINE-INDUCED DISORDER: Primary | ICD-10-CM

## 2021-08-03 NOTE — TELEPHONE ENCOUNTER
Patient rec'd a letter that Faye is recalled. She needs a new medication to help her stop smoking. Patient uses [facility] in [place]. Ok to call and lm [phone]

## 2021-08-04 NOTE — TELEPHONE ENCOUNTER
Patient calls back and she smokes a little more than over a pack a day.  The gum is not working without the Chantix.  Patient has to completely stop smoking before her weight loss surgery.

## 2021-08-05 RX ORDER — NICOTINE 21 MG/24HR
1 PATCH, TRANSDERMAL 24 HOURS TRANSDERMAL EVERY 24 HOURS
Qty: 30 PATCH | Refills: 1 | Status: SHIPPED | OUTPATIENT
Start: 2021-08-05 | End: 2021-10-25

## 2021-08-05 NOTE — TELEPHONE ENCOUNTER
Patient informed of provider's message below.    Referral to [place] [employer] faxed to number on the referral.

## 2021-08-05 NOTE — TELEPHONE ENCOUNTER
For 1 pack a day it is recommended Nicotine patch 21 mg -- rx done    [place] [employer] [facility]- referral done

## 2021-08-06 ENCOUNTER — MEDICAL CORRESPONDENCE (OUTPATIENT)
Dept: HEALTH INFORMATION MANAGEMENT | Facility: CLINIC | Age: 51
End: 2021-08-06

## 2021-08-10 ENCOUNTER — TELEPHONE (OUTPATIENT)
Dept: INTERNAL MEDICINE | Facility: CLINIC | Age: 51
End: 2021-08-10

## 2021-08-10 NOTE — TELEPHONE ENCOUNTER
Patria from [facility] calling for an ok to delay re certification until tomorrow 8/11/21. Call Patria at [phone].

## 2021-08-11 ENCOUNTER — TELEPHONE (OUTPATIENT)
Dept: INTERNAL MEDICINE | Facility: CLINIC | Age: 51
End: 2021-08-11

## 2021-08-11 ENCOUNTER — TELEPHONE (OUTPATIENT)
Dept: PALLIATIVE MEDICINE | Facility: CLINIC | Age: 51
End: 2021-08-11

## 2021-08-11 DIAGNOSIS — Z53.9 DIAGNOSIS NOT YET DEFINED: Primary | ICD-10-CM

## 2021-08-11 DIAGNOSIS — B37.0 ORAL THRUSH: ICD-10-CM

## 2021-08-11 DIAGNOSIS — F41.9 ANXIETY: ICD-10-CM

## 2021-08-11 DIAGNOSIS — M79.18 MYOFASCIAL PAIN: ICD-10-CM

## 2021-08-11 DIAGNOSIS — E78.5 HYPERLIPIDEMIA LDL GOAL <130: Chronic | ICD-10-CM

## 2021-08-11 PROCEDURE — 99207 PR MD RECERTIFICATION HHA PT: CPT | Performed by: INTERNAL MEDICINE

## 2021-08-11 RX ORDER — METHOCARBAMOL 500 MG/1
500 TABLET, FILM COATED ORAL 3 TIMES DAILY PRN
Qty: 120 TABLET | Refills: 0 | OUTPATIENT
Start: 2021-08-11

## 2021-08-11 NOTE — TELEPHONE ENCOUNTER
I have not seen this patient since April and she has not started working with any of the therapies that were recommended. For any further medication prescriptions she will need to set up a clinic visit with me. If she isn't interested in working with our clinic at this time she can discuss prescriptions with her PCP.     Fabiola Chambers MD  [facility]

## 2021-08-11 NOTE — TELEPHONE ENCOUNTER
[facility] calling for Re certification for SKILLED NURSING. Weekly nurse visits for medication, education,diseasease management education.1/weekly for 9 weeks. 3 PRN.  Patient is going out of town on 8/17/21 and she needs refills of some of her medications  Melania [phone].

## 2021-08-11 NOTE — TELEPHONE ENCOUNTER
Received fax from pharmacy requesting refill(s) for methocarbamol (ROBAXIN) 500 MG tablet     Last refilled on 06/07/21    Pt last seen on 04/01/21  Next appt scheduled for None    E-prescribe to:     [facility] [address] [place]  [place] - [address]    Will facilitate refill.      Mamie Pineda MA  [facility] [place]

## 2021-08-12 ENCOUNTER — TELEPHONE (OUTPATIENT)
Dept: PALLIATIVE MEDICINE | Facility: CLINIC | Age: 51
End: 2021-08-12

## 2021-08-12 ENCOUNTER — HOSPITAL ENCOUNTER (OUTPATIENT)
Dept: NUCLEAR MEDICINE | Facility: CLINIC | Age: 51
Setting detail: NUCLEAR MEDICINE
Discharge: HOME | End: 2021-08-12
Attending: INTERNAL MEDICINE | Admitting: INTERNAL MEDICINE
Payer: COMMERCIAL

## 2021-08-12 DIAGNOSIS — R10.13 EPIGASTRIC PAIN: ICD-10-CM

## 2021-08-12 PROCEDURE — A9541 TC99M SULFUR COLLOID: HCPCS | Performed by: INTERNAL MEDICINE

## 2021-08-12 PROCEDURE — 343N000001 HC RX 343: Performed by: INTERNAL MEDICINE

## 2021-08-12 PROCEDURE — 78264 GASTRIC EMPTYING IMG STUDY: CPT

## 2021-08-12 RX ORDER — ATORVASTATIN CALCIUM 80 MG/1
TABLET, FILM COATED ORAL
Qty: 90 TABLET | Refills: 0 | Status: SHIPPED | OUTPATIENT
Start: 2021-08-12 | End: 2021-11-22

## 2021-08-12 RX ORDER — HYDROXYZINE HYDROCHLORIDE 50 MG/1
TABLET, FILM COATED ORAL
Qty: 70 TABLET | Refills: 5 | Status: SHIPPED | OUTPATIENT
Start: 2021-08-12 | End: 2021-11-01

## 2021-08-12 RX ORDER — NYSTATIN 100000/ML
SUSPENSION, ORAL (FINAL DOSE FORM) ORAL
Qty: 120 ML | Refills: 0 | Status: SHIPPED | OUTPATIENT
Start: 2021-08-12 | End: 2022-02-16

## 2021-08-12 RX ADMIN — Medication 2 MILLICURIE: at 08:19

## 2021-08-12 NOTE — TELEPHONE ENCOUNTER
Called pt. Left message with details below (does have appt with Pain PhD 08/26/21)    Brenda MONGE, RN Care Coordinator  [facility]  Pain Management

## 2021-08-12 NOTE — TELEPHONE ENCOUNTER
Left detailed voicemail message for Melania EARLY giving approval for the requested orders. SERJIO Chandra R.N.

## 2021-08-12 NOTE — TELEPHONE ENCOUNTER
Pending Prescriptions:                       Disp   Refills    nystatin (MYCOSTATIN) 587438 UNIT/ML suspe*120 mL 0        Sig: TAKE 5 ML BY MOUTH FOUR TIMES A DAY    atorvastatin (LIPITOR) 80 MG tablet [Pharm*90 tab*0        Sig: TAKE ONE TABLET BY MOUTH EVERY DAY    hydrOXYzine (ATARAX) 50 MG tablet [Pharmac*70 tab*5        Sig: TAKE TWO TABLETS BY MOUTH THREE TIMES A DAY AS NEEDED           FOR ANXIETY MAX OF 70 TABLETS PER WEEK    Routing refill request to provider for review/approval because:  Patient fails protocol

## 2021-08-12 NOTE — TELEPHONE ENCOUNTER
Patient called to cancel all her appointments with the pain clinic, patient stated she was not getting help here and wanted to go somewhere else.        Routing as a MARICEL Deras    [place] [facility]

## 2021-08-12 NOTE — TELEPHONE ENCOUNTER
See there encounter 08/12/21. Pt wishes to cancel all appointments with pain clinic. Closing encounter    Brenda MONGE, RN Care Coordinator  [facility]  Pain [facility]

## 2021-08-13 ENCOUNTER — TELEPHONE (OUTPATIENT)
Dept: GASTROENTEROLOGY | Facility: CLINIC | Age: 51
End: 2021-08-13

## 2021-08-13 DIAGNOSIS — R21 RASH: ICD-10-CM

## 2021-08-13 NOTE — TELEPHONE ENCOUNTER
[facility] Call Center    Phone Message    May a detailed message be left on voicemail: no     Reason for Call: Other: Pt had GI emptying 8.11.21 and rec'd results that it was abnormal. Pt asking for a call back and decide what the next step is.  Please advise.      Action Taken: Message routed to:  Adult Clinics: Gastroenterology (GI) p [id number]    Travel Screening: Not Applicable

## 2021-08-16 ENCOUNTER — TELEPHONE (OUTPATIENT)
Dept: GASTROENTEROLOGY | Facility: CLINIC | Age: 51
End: 2021-08-16

## 2021-08-16 DIAGNOSIS — M79.18 MYOFASCIAL PAIN: ICD-10-CM

## 2021-08-16 NOTE — TELEPHONE ENCOUNTER
Pending Prescriptions:                       Disp   Refills    methocarbamol (ROBAXIN) 500 MG tablet     120 ta*0            Sig: Take 1 tablet (500 mg) by mouth 3 times daily as           needed for muscle spasms    Last refill 06/07/2021  Last office visit 4/1/2021  Next appointment Denilson Vega MA

## 2021-08-16 NOTE — TELEPHONE ENCOUNTER
[facility] Call Center    Phone Message    May a detailed message be left on voicemail: yes     Reason for Call: Robert Velázquez's homecare nurse is requesting a call back to discuss if there are medications that would help her be more comfortable.  Thanks.    Action Taken: Message routed to:  Adult Clinics: Gastroenterology (GI) p [id number]    Travel Screening: Not Applicable

## 2021-08-16 NOTE — TELEPHONE ENCOUNTER
Message left for [facility] RN with [facility] informing of provider recommendations based on Gastric Emptying Scan results:  Work on smoking cessation, smaller/more frequent snacks and good blood sugar control as high blood sugars impact the rate that stomach empties.  Requested return call if further information needs to be relayed regarding patient's symptoms.       Dicyclomine was prescribed at 7/21/2021 office visit to take three times daily as needed.     Tiffany Maza RN

## 2021-08-17 DIAGNOSIS — M79.18 MYOFASCIAL PAIN: ICD-10-CM

## 2021-08-17 RX ORDER — NYSTATIN 100000 [USP'U]/G
POWDER TOPICAL
Qty: 45 G | Refills: 3 | Status: SHIPPED | OUTPATIENT
Start: 2021-08-17 | End: 2022-08-02

## 2021-08-17 RX ORDER — METHOCARBAMOL 500 MG/1
500 TABLET, FILM COATED ORAL 3 TIMES DAILY PRN
Qty: 120 TABLET | Refills: 0 | Status: CANCELLED | OUTPATIENT
Start: 2021-08-17

## 2021-08-18 ENCOUNTER — TELEPHONE (OUTPATIENT)
Dept: INTERNAL MEDICINE | Facility: CLINIC | Age: 51
End: 2021-08-18

## 2021-08-18 RX ORDER — METHOCARBAMOL 500 MG/1
500 TABLET, FILM COATED ORAL 3 TIMES DAILY PRN
Qty: 120 TABLET | Refills: 0 | OUTPATIENT
Start: 2021-08-18

## 2021-08-18 NOTE — TELEPHONE ENCOUNTER
I am not following this patient in clinic. She will need to reach out to PCP for further medication refills. Please refer to previous encounter in which this was discussed with patient by nursing.     Fabiola Chambers MD  [facility] Pain Management

## 2021-08-26 ENCOUNTER — TELEPHONE (OUTPATIENT)
Dept: INTERNAL MEDICINE | Facility: CLINIC | Age: 51
End: 2021-08-26

## 2021-08-26 NOTE — TELEPHONE ENCOUNTER
Fax received from [facility] 08/07/21 for review and signature.  Put in Dr. Low's in basket.

## 2021-09-05 ENCOUNTER — HEALTH MAINTENANCE LETTER (OUTPATIENT)
Age: 51
End: 2021-09-05

## 2021-09-13 ENCOUNTER — OFFICE VISIT (OUTPATIENT)
Dept: NEUROSURGERY | Facility: CLINIC | Age: 51
End: 2021-09-13
Attending: NURSE PRACTITIONER
Payer: COMMERCIAL

## 2021-09-13 VITALS
SYSTOLIC BLOOD PRESSURE: 115 MMHG | BODY MASS INDEX: 43.27 KG/M2 | OXYGEN SATURATION: 96 % | HEIGHT: 63 IN | WEIGHT: 244.2 LBS | DIASTOLIC BLOOD PRESSURE: 76 MMHG | HEART RATE: 82 BPM

## 2021-09-13 DIAGNOSIS — Z98.1 S/P CERVICAL SPINAL FUSION: ICD-10-CM

## 2021-09-13 DIAGNOSIS — M54.12 CERVICAL RADICULOPATHY: Primary | ICD-10-CM

## 2021-09-13 PROCEDURE — G0463 HOSPITAL OUTPT CLINIC VISIT: HCPCS

## 2021-09-13 PROCEDURE — 99203 OFFICE O/P NEW LOW 30 MIN: CPT | Performed by: NURSE PRACTITIONER

## 2021-09-13 ASSESSMENT — MIFFLIN-ST. JEOR: SCORE: 1686.81

## 2021-09-13 ASSESSMENT — PAIN SCALES - GENERAL: PAINLEVEL: EXTREME PAIN (8)

## 2021-09-13 NOTE — PATIENT INSTRUCTIONS
Order for cervical spine MRI. You can stop at the  to schedule, or call [phone]. I will call with the results and next steps.     Please call our clinic if symptoms persist, change, or worsen at any time.    Shahrzad Pugh [facility] Neurosurgery  [address] [address]  Tel [phone]  Pager [phone]

## 2021-09-13 NOTE — NURSING NOTE
Chief Complaint   Patient presents with     Neck Pain     Patient reports having pain in the forearm that runs from top to bottom of arm and affects .

## 2021-09-13 NOTE — LETTER
"    9/13/2021         RE: Swetha Patterson  [address]  [facility] [place] [address]        Dear Colleague,    Thank you for referring your patient, Swetha Patterson, to the [facility] [place]. Please see a copy of my visit note below.    Dr. Rob Adams   [facility] Neurosurgery Clinic Visit      CC: neck pain, right arm pain/weakness     Primary care Provider: Roro Chawla    Reason For Visit:   Self referral       HPI: Swetha Patterson is a 51 year old female with history of C5-6 ACDF with Dr. Eric in 2018. She did well initially post op, but then developed neck pain and right arm pain about 6 months ago. Then 1 month ago she developed RUE weakness and difficulty with  strength. Denies any trauma or injuries. Describes the pain as aching. Pain is worsened with heavy lifting and activity. She has been doing home stretches. She recently saw [facility] last week, they obtained XR and recommended PT. She also had EMG two weeks ago at [facility] in [place]. Denies any falls or bladder/bowel incontinence.     Current pain: 8/10   At worst: 10/10    Past Medical History:   Diagnosis Date     Anxiety state, unspecified      Asthma      Chronic pain     back pain from cyst     Contact dermatitis and other eczema, due to unspecified cause      COPD (chronic obstructive pulmonary disease) (H)      Depressive disorder, not elsewhere classified      Diabetes (H)      Emphysema with chronic bronchitis (H)      Esophageal reflux      Family history of ischemic heart disease      Fibromyalgia      Gastro-oesophageal reflux disease      Heart disease     has chest pain sometimes     History of emphysema      Hoarseness      HTN, goal below 140/90      Hyperlipidemia LDL goal <130      Liver disease     \"fatty liver\"     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)      PONV (postoperative nausea and vomiting)      Rectal bleeding        Past Medical History reviewed with patient during " visit.    Past Surgical History:   Procedure Laterality Date     ARTHROSCOPY SHOULDER DECOMPRESSION Left 10/21/2015    Procedure: ARTHROSCOPY SHOULDER DECOMPRESSION;  Surgeon: Julien Milian MD;  Location: [facility] OR     BIOPSY ARTERY TEMPORAL Left 3/11/2020    Procedure: LEFT TEMPORAL ARTERY BIOPSY;  Surgeon: Ibeth Conner MD;  Location: [facility] OR     BRONCHIAL THERMOPLASTY N/A 11/14/2014    Procedure: BRONCHIAL THERMOPLASTY;  Surgeon: Ward Whitaker MD;  Location: [facility] GI     BRONCHIAL THERMOPLASTY N/A 12/19/2014    Procedure: BRONCHIAL THERMOPLASTY;  Surgeon: Ward Whitaker MD;  [facility]: [facility] OR     BRONCHIAL THERMOPLASTY N/A 2/6/2015    Procedure: BRONCHIAL THERMOPLASTY;  Surgeon: Ward Whitaker MD;  Location: [facility] OR     C APPENDECTOMY  at age 17     COLONOSCOPY N/A 11/26/2018    Procedure: COLONOSCOPY ([facility]);  Surgeon: Marshall Oakes MD;  Location: [facility] OR     COLONOSCOPY N/A 10/22/2020    Procedure: Colonoscopy;  Surgeon: Marshall Mccormick MD;  Location: [facility] OR     DISCECTOMY, FUSION CERVICAL ANTERIOR ONE LEVEL, COMBINED N/A 5/1/2018    Procedure: COMBINED DISCECTOMY, FUSION CERVICAL ANTERIOR ONE LEVEL;  1.  C5-C6 anterior cervical diskectomy and fusion.    2.  C5-C6 application of intervertebral biomechanical device for interbody fusion purposes.    3.  C5-C6 anterior instrumentation using the standard Kristin InViZia 24 mm plate with four associated bone screws, 12 mm in length.  Inferior screws are fixed.  Superior screws are va     ENT SURGERY  2015    polyps removed from vocal cords      ESOPHAGOSCOPY, GASTROSCOPY, DUODENOSCOPY (EGD), COMBINED N/A 10/22/2020    Procedure: Esophagoscopy, gastroscopy, duodenoscopy with biopsies;  Surgeon: Marshall Mccormick MD;  Location: [facility] OR     ESOPHAGOSCOPY, GASTROSCOPY, DUODENOSCOPY (EGD), COMBINED N/A 6/17/2021    Procedure: ESOPHAGOGASTRODUODENOSCOPY, WITH BIOPSY;  Surgeon: Darrel Damon MD;  Location:  GI     EXCISE NODE MEDIASTINAL   4/26/2013    Procedure: EXCISE NODE MEDIASTINAL;;  Surgeon: Av Peña MD;  Location: [facility] OR     LAPAROSCOPIC CHOLECYSTECTOMY N/A 10/19/2017    Procedure: LAPAROSCOPIC CHOLECYSTECTOMY;  LAPAROSCOPIC CHOLECYSTECTOMY;  Surgeon: Ney Jrery MD;  Location:  OR     THORACOSCOPY  4/26/2013    Procedure: THORACOSCOPY;  LEFT VIDEO ASSISTED THORACOSCOPY, RESECTION OF POSTERIOR MEDIASTINAL MASS;  Surgeon: Av Peña MD;  Location: [facility] OR     TONSILLECTOMY  as a kid     TONSILLECTOMY       Past Surgical History reviewed with patient during visit.    Current Outpatient Medications   Medication     albuterol (PROVENTIL) (2.5 MG/3ML) 0.083% neb solution     atorvastatin (LIPITOR) 80 MG tablet     benzoyl peroxide (ACNE-CLEAR) 10 % external gel     blood glucose (ACCU-CHEK ALLYSON PLUS) test strip     blood glucose (NO BRAND SPECIFIED) lancets standard     blood glucose (NO BRAND SPECIFIED) lancets standard     blood glucose (NO BRAND SPECIFIED) test strip     blood glucose monitoring (NO BRAND SPECIFIED) meter device kit     blood glucose monitoring (NO BRAND SPECIFIED) meter device kit     buPROPion (WELLBUTRIN XL) 150 MG 24 hr tablet     cetirizine HCl 10 MG CAPS     clindamycin 1 % EX external gel     clonazePAM (KLONOPIN) 1 MG tablet     cyclobenzaprine (FLEXERIL) 5 MG tablet     dicyclomine (BENTYL) 20 MG tablet     DULoxetine (CYMBALTA) 20 MG capsule     fluticasone (FLONASE) 50 MCG/ACT nasal spray     Fluticasone-Umeclidin-Vilanterol (TRELEGY ELLIPTA) 100-62.5-25 MCG/INH oral inhaler     furosemide (LASIX) 20 MG tablet     glipiZIDE (GLUCOTROL XL) 2.5 MG 24 hr tablet     hydrocortisone 2.5 % cream     hydrOXYzine (ATARAX) 50 MG tablet     lamoTRIgine (LAMICTAL) 100 MG tablet     lamoTRIgine (LAMICTAL) 150 MG tablet     lisinopril (ZESTRIL) 20 MG tablet     metFORMIN (GLUCOPHAGE) 500 MG tablet     methocarbamol (ROBAXIN) 500 MG tablet     montelukast (SINGULAIR) 10 MG tablet     nicotine  (NICODERM CQ) 21 MG/24HR 24 hr patch     nicotine (NICORETTE) 2 MG gum     nicotine polacrilex (RA NICOTINE GUM) 4 MG gum     nitroGLYcerin (NITROSTAT) 0.4 MG sublingual tablet     nystatin (MYCOSTATIN) 744979 UNIT/GM external powder     nystatin (MYCOSTATIN) 384171 UNIT/ML suspension     omeprazole (PRILOSEC) 20 MG DR capsule     omeprazole (PRILOSEC) 40 MG DR capsule     omeprazole 20 MG tablet     ondansetron (ZOFRAN ODT) 4 MG ODT tab     ondansetron (ZOFRAN) 8 MG tablet     order for DME     order for DME     order for DME     polyethylene glycol (MIRALAX) 17 GM/Dose powder     predniSONE (DELTASONE) 10 MG tablet     prochlorperazine (COMPAZINE) 10 MG tablet     sucralfate (CARAFATE) 1 GM tablet     sucralfate (CARAFATE) 1 GM/10ML suspension     sulfamethoxazole-trimethoprim (BACTRIM DS) 800-160 MG tablet     terbinafine (LAMISIL) 1 % external cream     varenicline (CHANTIX CHAPITO) 0.5 MG X 11 & 1 MG X 42 tablet     VENTOLIN  (90 Base) MCG/ACT inhaler     valACYclovir (VALTREX) 1000 mg tablet     No current facility-administered medications for this visit.       Allergies   Allergen Reactions     Codeine Nausea and Vomiting     vomiting     Cyclobenzaprine Nausea     Gabapentin Rash     Hydrocodone Nausea and Vomiting     Sulfa Drugs Nausea and Vomiting     Nausea       Social History     Socioeconomic History     Marital status:      Spouse name: Not on file     Number of children: Not on file     Years of education: Not on file     Highest education level: Not on file   Occupational History     Not on file   Tobacco Use     Smoking status: Current Every Day Smoker     Packs/day: 0.50     Years: 30.00     Pack years: 15.00     Types: Cigarettes     Start date: 1/1/1985     Smokeless tobacco: Never Used     Tobacco comment: Is trying to quit, using nicotine gum and patch   Substance and Sexual Activity     Alcohol use: No     Alcohol/week: 0.0 standard drinks     Drug use: No     Sexual activity: Not  Currently     Partners: Male     Birth control/protection: None   Other Topics Concern      Service Not Asked     Blood Transfusions Not Asked     Caffeine Concern No     Comment: 4-5 cans of pop daily     Occupational Exposure Not Asked     Hobby Hazards Not Asked     Sleep Concern Not Asked     Stress Concern Not Asked     Weight Concern Not Asked     Special Diet No     Back Care Not Asked     Exercise No     Comment: on hold     Bike Helmet Not Asked     Seat Belt Not Asked     Self-Exams Not Asked     Parent/sibling w/ CABG, MI or angioplasty before 65F 55M? Not Asked   Social History Narrative     Not on file     Social Determinants of Health     Financial Resource Strain:      Difficulty of Paying Living Expenses:    Food Insecurity:      Worried About Running Out of Food in the Last Year:      Ran Out of Food in the Last Year:    Transportation Needs:      Lack of Transportation (Medical):      Lack of Transportation (Non-Medical):    Physical Activity:      Days of Exercise per Week:      Minutes of Exercise per Session:    Stress:      Feeling of Stress :    Social Connections:      Frequency of Communication with Friends and Family:      Frequency of Social Gatherings with Friends and Family:      Attends Yazidism Services:      Active Member of Clubs or Organizations:      Attends Club or Organization Meetings:      Marital Status:    Intimate Partner Violence:      Fear of Current or Ex-Partner:      Emotionally Abused:      Physically Abused:      Sexually Abused:        Family History   Problem Relation Age of Onset     Heart Disease Mother         murmur, mi     Hypertension Mother      Cerebrovascular Disease Mother      Depression Mother      Gallbladder Disease Mother      Asthma Mother      Family History Negative Father         does not know  him     Family History Negative Brother      Heart Disease Maternal Grandfather      Asthma Maternal Grandfather      Hypertension Maternal  "Grandfather      Cerebrovascular Disease Maternal Grandfather      Diabetes Maternal Grandfather      Asthma Sister      Hypertension Sister      Cerebrovascular Disease Sister      Hypertension Maternal Grandmother      Cerebrovascular Disease Maternal Grandmother        ROS: 10 point ROS neg other than the symptoms noted above in the HPI.    Vital Signs: /76 (BP Location: Right arm, Patient Position: Sitting)   Pulse 82   Ht 5' 3\" (1.6 m)   Wt 244 lb 3.2 oz (110.8 kg)   LMP 04/15/2016 (Exact Date)   SpO2 96%   BMI 43.26 kg/m      Examination:  Constitutional:  Alert, well nourished, NAD.  HEENT: Normocephalic, atraumatic.   Pulmonary:  Without shortness of breath, normal effort.   Lymph: No lymphadenopathy to low back or LE.   Integumentary: Skin is free of rashes or lesions.   Cardiovascular:  No pitting edema of BLE.      Neurological:  Awake  Alert  Oriented x 3  Speech clear  Cranial nerves II - XII grossly intact  PERRL  EOMI  Face symmetric  Tongue midline  Motor exam   Shoulder Abduction:  Right:  4-/5   Left:  5/5  Biceps:                      Right:  4/5   Left:  5/5  Triceps:                     Right:  4/5   Left:  5/5  Wrist Extensors:        Right:  5/5   Left:  5/5  Wrist Flexors:            Right:  5/5   Left:  5/5  Intrinsics:                   Right:  4-/5   Left:  5/5    Sensation normal to bilateral upper and lower extremities.    Reflexes are 2+ in the brachial radialis and triceps. Negative Louise sign bilaterally.    Musculoskeletal:  Gait: Able to stand from a seated position. Normal non-antalgic, non-myelopathic gait. Able to heel/toe walk without loss of balance.    Cervical examination reveals good range of motion.  No tenderness to palpation of the cervical spine or paraspinous muscles bilaterally.    Imaging:   No recent cervical spine MRI has been obtained    Assessment/Plan:   Prior C5-6 ACDF   Neck pain   RUE pain and weakness    51 year old female with history of C5-6 " ACDF with Dr. Eric in 2018. She did well initially post op, but then developed neck pain and right arm pain about 6 months ago. Then 1 month ago she developed RUE weakness and difficulty with  strength.     - MRI cervical spine  - Obtain records from [facility] including recent XR  - Obtain EMG records from [facility]  - Will follow-up with patient after MRI has been completed    Advised patient to call our clinic with any questions or concerns. Discussed red flag symptoms and advised to seek medical attention if these develop. Patient voiced understanding and agreement.      Shahrzad Pugh CNP  [facility] Neurosurgery  [address] [address]  Tel [phone]  Pager [phone]          Again, thank you for allowing me to participate in the care of your patient.        Sincerely,        Shahrzad Pugh NP

## 2021-09-13 NOTE — PROGRESS NOTES
"Dr. Rob Adams   [facility] Neurosurgery Clinic Visit      CC: neck pain, right arm pain/weakness     Primary care Provider: Roro Chawla    Reason For Visit:   Self referral       HPI: Swetha Patterson is a 51 year old female with history of C5-6 ACDF with Dr. Eric in 2018. She did well initially post op, but then developed neck pain and right arm pain about 6 months ago. Then 1 month ago she developed RUE weakness and difficulty with  strength. Denies any trauma or injuries. Describes the pain as aching. Pain is worsened with heavy lifting and activity. She has been doing home stretches. She recently saw [facility] last week, they obtained XR and recommended PT. She also had EMG two weeks ago at [facility] in [place]. Denies any falls or bladder/bowel incontinence.     Current pain: 8/10   At worst: 10/10    Past Medical History:   Diagnosis Date     Anxiety state, unspecified      Asthma      Chronic pain     back pain from cyst     Contact dermatitis and other eczema, due to unspecified cause      COPD (chronic obstructive pulmonary disease) (H)      Depressive disorder, not elsewhere classified      Diabetes (H)      Emphysema with chronic bronchitis (H)      Esophageal reflux      Family history of ischemic heart disease      Fibromyalgia      Gastro-oesophageal reflux disease      Heart disease     has chest pain sometimes     History of emphysema      Hoarseness      HTN, goal below 140/90      Hyperlipidemia LDL goal <130      Liver disease     \"fatty liver\"     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)      PONV (postoperative nausea and vomiting)      Rectal bleeding        Past Medical History reviewed with patient during visit.    Past Surgical History:   Procedure Laterality Date     ARTHROSCOPY SHOULDER DECOMPRESSION Left 10/21/2015    Procedure: ARTHROSCOPY SHOULDER DECOMPRESSION;  Surgeon: Julien Milian MD;  Location: [facility] OR     BIOPSY ARTERY TEMPORAL Left 3/11/2020    Procedure: " LEFT TEMPORAL ARTERY BIOPSY;  Surgeon: Ibeth Conner MD;  Location: [facility] OR     BRONCHIAL THERMOPLASTY N/A 11/14/2014    Procedure: BRONCHIAL THERMOPLASTY;  Surgeon: Ward Whitaker MD;  Location: [facility] GI     BRONCHIAL THERMOPLASTY N/A 12/19/2014    Procedure: BRONCHIAL THERMOPLASTY;  Surgeon: Ward Whitaker MD;  Location: [facility] OR     BRONCHIAL THERMOPLASTY N/A 2/6/2015    Procedure: BRONCHIAL THERMOPLASTY;  Surgeon: Ward Whitaker MD;  Location: [facility] OR     C APPENDECTOMY  at age 17     COLONOSCOPY N/A 11/26/2018    Procedure: COLONOSCOPY ([facility]);  Surgeon: Marshall Oakes MD;  Location:  OR     COLONOSCOPY N/A 10/22/2020    Procedure: Colonoscopy;  Surgeon: Marshall Mccormick MD;  Location:  OR     DISCECTOMY, FUSION CERVICAL ANTERIOR ONE LEVEL, COMBINED N/A 5/1/2018    Procedure: COMBINED DISCECTOMY, FUSION CERVICAL ANTERIOR ONE LEVEL;  1.  C5-C6 anterior cervical diskectomy and fusion.    2.  C5-C6 application of intervertebral biomechanical device for interbody fusion purposes.    3.  C5-C6 anterior instrumentation using the standard Kristin InViZia 24 mm plate with four associated bone screws, 12 mm in length.  Inferior screws are fixed.  Superior screws are va     ENT SURGERY  2015    polyps removed from vocal cords      ESOPHAGOSCOPY, GASTROSCOPY, DUODENOSCOPY (EGD), COMBINED N/A 10/22/2020    Procedure: Esophagoscopy, gastroscopy, duodenoscopy with biopsies;  Surgeon: Marshall Mccormick MD;  Location:  OR     ESOPHAGOSCOPY, GASTROSCOPY, DUODENOSCOPY (EGD), COMBINED N/A 6/17/2021    Procedure: ESOPHAGOGASTRODUODENOSCOPY, WITH BIOPSY;  Surgeon: Darrel Damon MD;  Location:  GI     EXCISE NODE MEDIASTINAL  4/26/2013    Procedure: EXCISE NODE MEDIASTINAL;;  Surgeon: Av Peña MD;  Location:  OR     LAPAROSCOPIC CHOLECYSTECTOMY N/A 10/19/2017    Procedure: LAPAROSCOPIC CHOLECYSTECTOMY;  LAPAROSCOPIC CHOLECYSTECTOMY;  Surgeon: Ney Jerry MD;  Location:  [facility] OR     THORACOSCOPY  4/26/2013    Procedure: THORACOSCOPY;  LEFT VIDEO ASSISTED THORACOSCOPY, RESECTION OF POSTERIOR MEDIASTINAL MASS;  Surgeon: Av Peña MD;  Location: [facility] OR     TONSILLECTOMY  as a kid     TONSILLECTOMY       Past Surgical History reviewed with patient during visit.    Current Outpatient Medications   Medication     albuterol (PROVENTIL) (2.5 MG/3ML) 0.083% neb solution     atorvastatin (LIPITOR) 80 MG tablet     benzoyl peroxide (ACNE-CLEAR) 10 % external gel     blood glucose (ACCU-CHEK ALLYSON PLUS) test strip     blood glucose (NO BRAND SPECIFIED) lancets standard     blood glucose (NO BRAND SPECIFIED) lancets standard     blood glucose (NO BRAND SPECIFIED) test strip     blood glucose monitoring (NO BRAND SPECIFIED) meter device kit     blood glucose monitoring (NO BRAND SPECIFIED) meter device kit     buPROPion (WELLBUTRIN XL) 150 MG 24 hr tablet     cetirizine HCl 10 MG CAPS     clindamycin 1 % EX external gel     clonazePAM (KLONOPIN) 1 MG tablet     cyclobenzaprine (FLEXERIL) 5 MG tablet     dicyclomine (BENTYL) 20 MG tablet     DULoxetine (CYMBALTA) 20 MG capsule     fluticasone (FLONASE) 50 MCG/ACT nasal spray     Fluticasone-Umeclidin-Vilanterol (TRELEGY ELLIPTA) 100-62.5-25 MCG/INH oral inhaler     furosemide (LASIX) 20 MG tablet     glipiZIDE (GLUCOTROL XL) 2.5 MG 24 hr tablet     hydrocortisone 2.5 % cream     hydrOXYzine (ATARAX) 50 MG tablet     lamoTRIgine (LAMICTAL) 100 MG tablet     lamoTRIgine (LAMICTAL) 150 MG tablet     lisinopril (ZESTRIL) 20 MG tablet     metFORMIN (GLUCOPHAGE) 500 MG tablet     methocarbamol (ROBAXIN) 500 MG tablet     montelukast (SINGULAIR) 10 MG tablet     nicotine (NICODERM CQ) 21 MG/24HR 24 hr patch     nicotine (NICORETTE) 2 MG gum     nicotine polacrilex (RA NICOTINE GUM) 4 MG gum     nitroGLYcerin (NITROSTAT) 0.4 MG sublingual tablet     nystatin (MYCOSTATIN) 281655 UNIT/GM external powder     nystatin (MYCOSTATIN) 590399 UNIT/ML  suspension     omeprazole (PRILOSEC) 20 MG DR capsule     omeprazole (PRILOSEC) 40 MG DR capsule     omeprazole 20 MG tablet     ondansetron (ZOFRAN ODT) 4 MG ODT tab     ondansetron (ZOFRAN) 8 MG tablet     order for DME     order for DME     order for DME     polyethylene glycol (MIRALAX) 17 GM/Dose powder     predniSONE (DELTASONE) 10 MG tablet     prochlorperazine (COMPAZINE) 10 MG tablet     sucralfate (CARAFATE) 1 GM tablet     sucralfate (CARAFATE) 1 GM/10ML suspension     sulfamethoxazole-trimethoprim (BACTRIM DS) 800-160 MG tablet     terbinafine (LAMISIL) 1 % external cream     varenicline (CHANTIX CHAPITO) 0.5 MG X 11 & 1 MG X 42 tablet     VENTOLIN  (90 Base) MCG/ACT inhaler     valACYclovir (VALTREX) 1000 mg tablet     No current facility-administered medications for this visit.       Allergies   Allergen Reactions     Codeine Nausea and Vomiting     vomiting     Cyclobenzaprine Nausea     Gabapentin Rash     Hydrocodone Nausea and Vomiting     Sulfa Drugs Nausea and Vomiting     Nausea       Social History     Socioeconomic History     Marital status:      Spouse name: Not on file     Number of children: Not on file     Years of education: Not on file     Highest education level: Not on file   Occupational History     Not on file   Tobacco Use     Smoking status: Current Every Day Smoker     Packs/day: 0.50     Years: 30.00     Pack years: 15.00     Types: Cigarettes     Start date: 1/1/1985     Smokeless tobacco: Never Used     Tobacco comment: Is trying to quit, using nicotine gum and patch   Substance and Sexual Activity     Alcohol use: No     Alcohol/week: 0.0 standard drinks     Drug use: No     Sexual activity: Not Currently     Partners: Male     Birth control/protection: None   Other Topics Concern      Service Not Asked     Blood Transfusions Not Asked     Caffeine Concern No     Comment: 4-5 cans of pop daily     Occupational Exposure Not Asked     Hobby Hazards Not Asked      Sleep Concern Not Asked     Stress Concern Not Asked     Weight Concern Not Asked     Special Diet No     Back Care Not Asked     Exercise No     Comment: on hold     Bike Helmet Not Asked     Seat Belt Not Asked     Self-Exams Not Asked     Parent/sibling w/ CABG, MI or angioplasty before 65F 55M? Not Asked   Social History Narrative     Not on file     Social Determinants of Health     Financial Resource Strain:      Difficulty of Paying Living Expenses:    Food Insecurity:      Worried About Running Out of Food in the Last Year:      Ran Out of Food in the Last Year:    Transportation Needs:      Lack of Transportation (Medical):      Lack of Transportation (Non-Medical):    Physical Activity:      Days of Exercise per Week:      Minutes of Exercise per Session:    Stress:      Feeling of Stress :    Social Connections:      Frequency of Communication with Friends and Family:      Frequency of Social Gatherings with Friends and Family:      Attends Yarsanism Services:      Active Member of Clubs or Organizations:      Attends Club or Organization Meetings:      Marital Status:    Intimate Partner Violence:      Fear of Current or Ex-Partner:      Emotionally Abused:      Physically Abused:      Sexually Abused:        Family History   Problem Relation Age of Onset     Heart Disease Mother         murmur, mi     Hypertension Mother      Cerebrovascular Disease Mother      Depression Mother      Gallbladder Disease Mother      Asthma Mother      Family History Negative Father         does not know  him     Family History Negative Brother      Heart Disease Maternal Grandfather      Asthma Maternal Grandfather      Hypertension Maternal Grandfather      Cerebrovascular Disease Maternal Grandfather      Diabetes Maternal Grandfather      Asthma Sister      Hypertension Sister      Cerebrovascular Disease Sister      Hypertension Maternal Grandmother      Cerebrovascular Disease Maternal Grandmother        ROS: 10  "point ROS neg other than the symptoms noted above in the HPI.    Vital Signs: /76 (BP Location: Right arm, Patient Position: Sitting)   Pulse 82   Ht 5' 3\" (1.6 m)   Wt 244 lb 3.2 oz (110.8 kg)   LMP 04/15/2016 (Exact Date)   SpO2 96%   BMI 43.26 kg/m      Examination:  Constitutional:  Alert, well nourished, NAD.  HEENT: Normocephalic, atraumatic.   Pulmonary:  Without shortness of breath, normal effort.   Lymph: No lymphadenopathy to low back or LE.   Integumentary: Skin is free of rashes or lesions.   Cardiovascular:  No pitting edema of BLE.      Neurological:  Awake  Alert  Oriented x 3  Speech clear  Cranial nerves II - XII grossly intact  PERRL  EOMI  Face symmetric  Tongue midline  Motor exam   Shoulder Abduction:  Right:  4-/5   Left:  5/5  Biceps:                      Right:  4/5   Left:  5/5  Triceps:                     Right:  4/5   Left:  5/5  Wrist Extensors:        Right:  5/5   Left:  5/5  Wrist Flexors:            Right:  5/5   Left:  5/5  Intrinsics:                   Right:  4-/5   Left:  5/5    Sensation normal to bilateral upper and lower extremities.    Reflexes are 2+ in the brachial radialis and triceps. Negative Louise sign bilaterally.    Musculoskeletal:  Gait: Able to stand from a seated position. Normal non-antalgic, non-myelopathic gait. Able to heel/toe walk without loss of balance.    Cervical examination reveals good range of motion.  No tenderness to palpation of the cervical spine or paraspinous muscles bilaterally.    Imaging:   No recent cervical spine MRI has been obtained    Assessment/Plan:   Prior C5-6 ACDF   Neck pain   RUE pain and weakness    51 year old female with history of C5-6 ACDF with Dr. Eric in 2018. She did well initially post op, but then developed neck pain and right arm pain about 6 months ago. Then 1 month ago she developed RUE weakness and difficulty with  strength.     - MRI cervical spine  - Obtain records from [facility] including recent XR  - " Obtain EMG records from [facility]  - Will follow-up with patient after MRI has been completed    Advised patient to call our clinic with any questions or concerns. Discussed red flag symptoms and advised to seek medical attention if these develop. Patient voiced understanding and agreement.      Shahrzad Pugh [facility] Neurosurgery  [address] [address]  Tel [phone]  Pager [phone]

## 2021-09-15 ENCOUNTER — TELEPHONE (OUTPATIENT)
Dept: NEUROSURGERY | Facility: CLINIC | Age: 51
End: 2021-09-15

## 2021-09-15 ENCOUNTER — MYC MEDICAL ADVICE (OUTPATIENT)
Dept: INTERNAL MEDICINE | Facility: CLINIC | Age: 51
End: 2021-09-15

## 2021-09-15 RX ORDER — DIAZEPAM 5 MG
5 TABLET ORAL 2 TIMES DAILY PRN
Qty: 2 TABLET | Refills: 0 | Status: CANCELLED | OUTPATIENT
Start: 2021-09-15

## 2021-09-15 NOTE — TELEPHONE ENCOUNTER
Patient LVM on RN line in regards to sedation for MRI. Reviewed order and order calls for sedation. Patient transferred to scheduling line.

## 2021-09-15 NOTE — TELEPHONE ENCOUNTER
Patient is requesting general sedation instead of IV sedation, she would like a call back to discuss, she can be reached at [phone].

## 2021-09-15 NOTE — TELEPHONE ENCOUNTER
Orders changed to MRI with IV sedation. Pt will need an H&P done within 30 days of MRI.     Eunice Berrios RN

## 2021-09-15 NOTE — TELEPHONE ENCOUNTER
Radiology is requesting that the patient's MRI order be updated to include the IV or general sedation, please call patient to let her know when the order has been updated.

## 2021-09-16 NOTE — TELEPHONE ENCOUNTER
Attempted to reach out to patient, no answer. Left voice message for patient to call clinic back to further discuss.

## 2021-09-17 ENCOUNTER — TELEPHONE (OUTPATIENT)
Dept: NEUROSURGERY | Facility: CLINIC | Age: 51
End: 2021-09-17

## 2021-09-17 NOTE — TELEPHONE ENCOUNTER
Called [facility] and reviewed order. Needs to sent to RNs to approve and then they will call patient to schedule.     Updated patient that she is to get H&P and to wait for approval of MRI.

## 2021-09-17 NOTE — TELEPHONE ENCOUNTER
Reason for call: Pt called to advise she was told the anesthesia has to read 'GENERAL' on the order. Please call her back to discuss at [phone]. Thank you

## 2021-09-20 ENCOUNTER — TELEPHONE (OUTPATIENT)
Dept: NEUROSURGERY | Facility: CLINIC | Age: 51
End: 2021-09-20

## 2021-09-21 DIAGNOSIS — Z11.59 ENCOUNTER FOR SCREENING FOR OTHER VIRAL DISEASES: ICD-10-CM

## 2021-09-22 ENCOUNTER — TELEPHONE (OUTPATIENT)
Dept: NEUROSURGERY | Facility: CLINIC | Age: 51
End: 2021-09-22

## 2021-09-22 NOTE — TELEPHONE ENCOUNTER
After discussion with LETTY: returned call to patient and informed her that we'd be happy to see her in clinic for a formal lumbar work up, but would not be ordering a lumbar MRI at this point. Rukhsana verbalized agreement with this plan and declined appt with NP for now.     Instructed her to utilize OTC pain management therapies such as ice and heat therapy, salon pas/lidocaine patches or pain creams. She verbalized agreement.     Pt will call with further questions/concerns and will seek emergent care if needed as previously discussed.      Eunice Berrios RN

## 2021-09-22 NOTE — TELEPHONE ENCOUNTER
"Returned call to patient who reports a sudden onset of low back and RLE pain that began \"a couple of days ago.\" Denies bowel/bladder issues. Denies falls or imbalance.     Rukhsana also reports her cervical radiculopathy symptoms have progressed and she now cannot grasp with her right hand.     Informed her that her symptoms would be discussed with an LETTY and a return call would be made as quickly as possible.     Pt is requesting pain meds. Pt reports she cannot take ibuprofen or tylenol due to GI issues. Informed pt that it is unlikely we will be prescribing pain medication as we have not performed surgery in the past.     *Instructed pt to seek care in ED if she feels her symptoms are emergent/unmanageable.     Eunice Berrios RN   "

## 2021-09-22 NOTE — TELEPHONE ENCOUNTER
Reason for call: Pt called to ask for a nurse to call back because she is having a lot of pain that has become unbearable. Please call her back at [phone]. Thank you

## 2021-09-24 ENCOUNTER — OFFICE VISIT (OUTPATIENT)
Dept: INTERNAL MEDICINE | Facility: CLINIC | Age: 51
End: 2021-09-24
Payer: COMMERCIAL

## 2021-09-24 VITALS
DIASTOLIC BLOOD PRESSURE: 64 MMHG | OXYGEN SATURATION: 96 % | SYSTOLIC BLOOD PRESSURE: 100 MMHG | TEMPERATURE: 98.3 F | WEIGHT: 241 LBS | HEIGHT: 64 IN | BODY MASS INDEX: 41.15 KG/M2 | HEART RATE: 104 BPM | RESPIRATION RATE: 20 BRPM

## 2021-09-24 DIAGNOSIS — Z01.818 PRE-OP EXAM: Primary | ICD-10-CM

## 2021-09-24 DIAGNOSIS — M54.12 CERVICAL RADICULOPATHY: ICD-10-CM

## 2021-09-24 PROBLEM — J44.1 OBSTRUCTIVE CHRONIC BRONCHITIS WITH EXACERBATION (H): Status: RESOLVED | Noted: 2020-09-09 | Resolved: 2021-09-24

## 2021-09-24 PROBLEM — M50.20 DISPLACEMENT OF CERVICAL INTERVERTEBRAL DISC WITHOUT MYELOPATHY: Status: RESOLVED | Noted: 2017-11-09 | Resolved: 2021-09-24

## 2021-09-24 PROCEDURE — 99214 OFFICE O/P EST MOD 30 MIN: CPT | Performed by: INTERNAL MEDICINE

## 2021-09-24 PROCEDURE — 93000 ELECTROCARDIOGRAM COMPLETE: CPT | Performed by: INTERNAL MEDICINE

## 2021-09-24 ASSESSMENT — MIFFLIN-ST. JEOR: SCORE: 1688.17

## 2021-09-24 NOTE — PROGRESS NOTES
[place] [address]  [facility] [address]  Phone: [phone]  Primary Provider: Roro Chawla  Pre-op Performing Provider: DERRICK LOTT      PREOPERATIVE EVALUATION:  Today's date: 9/24/2021    Swetha Patterson is a 51 year old female who presents for a preoperative evaluation.    Surgical Information:  Surgery/Procedure: MRI under anesthesia  Surgery Location: [facility]  Surgeon: Radiology  Surgery Date: 10/7/21  Time of Surgery: 6:30 am   Where patient plans to recover: At home with family  Fax number for surgical facility: Note does not need to be faxed, will be available electronically in Epic.    Type of Anesthesia Anticipated:General  Assessment & Plan     The proposed surgical procedure is considered LOW risk.    Pre-op exam    - EKG 12-lead complete w/read - Clinics    Cervical radiculopathy             Risks and Recommendations:  The patient has the following additional risks and recommendations for perioperative complications:  Pulmonary:    - Incentive spirometry post-op   - Active nicotine user, advised smoking cessation    Medication Instructions:  She will take medications after the procedure.     RECOMMENDATION:  APPROVAL GIVEN to proceed with proposed procedure, without further diagnostic evaluation.    6}    Subjective     HPI related to upcoming procedure: she requires MRI neck but has severe anxiety and claustrophobia so will need anesthesia    Preop Questions 9/18/2021   1. Have you ever had a heart attack or stroke? No   2. Have you ever had surgery on your heart or blood vessels, such as a stent placement, a coronary artery bypass, or surgery on an artery in your head, neck, heart, or legs? No   3. Do you have chest pain with activity? No   4. Do you have a history of  heart failure? No   5. Do you currently have a cold, bronchitis or symptoms of other infection? No   6. Do you have a cough, shortness of breath, or  wheezing? No   7. Do you or anyone in your family have previous history of blood clots? No   8. Do you or does anyone in your family have a serious bleeding problem such as prolonged bleeding following surgeries or cuts? No   9. Have you ever had problems with anemia or been told to take iron pills? No   10. Have you had any abnormal blood loss such as black, tarry or bloody stools, or abnormal vaginal bleeding? no   11. Have you ever had a blood transfusion? No   12. Are you willing to have a blood transfusion if it is medically needed before, during, or after your surgery? Yes   13. Have you or any of your relatives ever had problems with anesthesia? No   14. Do you have sleep apnea, excessive snoring or daytime drowsiness? No   15. Do you have any artifical heart valves or other implanted medical devices like a pacemaker, defibrillator, or continuous glucose monitor? No   16. Do you have artificial joints? No   17. Are you allergic to latex? No   18. Is there any chance that you may be pregnant? No       Health Care Directive:  Patient does not have a Health Care Directive or Living Will:     Preoperative Review of :   reviewed - controlled substances reflected in medication list.      Status of Chronic Conditions:  COPD - Patient has a longstanding history of moderate-severe COPD . Patient has been doing well overall noting stable dyspnea on exertion  and continues on medication regimen consisting of inhalers without adverse reactions or side effects.    HYPERTENSION - Patient has longstanding history of HTN , currently denies any symptoms referable to elevated blood pressure. Specifically denies chest pain, palpitations, dyspnea, orthopnea, PND or peripheral edema. Blood pressure readings have been in normal range. Current medication regimen is as listed below. Patient denies any side effects of medication.       Review of Systems  GENERAL: negative for, fever, chills, weight loss, weight gain  EYES:  negative  ENT: negative  RESPIRATORY: No dyspnea on exertion and No cough  CARDIOVASCULAR: negative for, palpitations, tachycardia, irregular heart beat and chest pain  GI: negative for, abdominal pain, melena, hematochezia and positive for episodic nausea due to hiatal hernia  : negative for, dysuria and hematuria  MUSCULOSKELETAL: neck pain and shoulder   NEUROLOGIC: positive for headaches and numbness or tingling of hands, negative for, seizures and local weakness  SKIN: negative  ENDOCRINE: sugars well controlled          Patient Active Problem List    Diagnosis Date Noted     Personal history of tobacco use, presenting hazards to health 09/09/2020     Priority: Medium     Morbid obesity (H) 08/31/2020     Priority: Medium     Claudication of both lower extremities (H) 03/06/2020     Priority: Medium     PAD (peripheral artery disease) (H) 03/06/2020     Priority: Medium     Jaw claudication 03/06/2020     Priority: Medium     Added automatically from request for surgery [id number]       History of opioid abuse (H) Rx was stopped when she failed urine drug test 12/13/2019     Priority: Medium     HTN, goal below 140/90 01/12/2019     Priority: Medium     Vocal cord polyp 06/28/2018     Priority: Medium     Status post cervical spinal fusion 05/01/2018     Priority: Medium     Intentional drug overdose (H) 02/08/2018     Priority: Medium     DIAZ (nonalcoholic steatohepatitis) 06/17/2016     Priority: Medium     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  Doesn't drink, but relapsed (doesn't remember).  Was on Valium for 4 years (until October 2017). Xanax since 11/2017.  Managed by hospital.        Past Medical History:   Diagnosis Date     Anxiety state, unspecified      Asthma      Chronic pain     back pain from cyst     Contact dermatitis and other eczema, due to unspecified cause      COPD (chronic obstructive pulmonary disease) (H)      Depressive disorder, not elsewhere classified      Diabetes (H)      Emphysema with chronic bronchitis (H)      Esophageal reflux      Family history of ischemic heart disease      Fibromyalgia      Gastro-oesophageal reflux disease      Heart disease     has chest pain sometimes     History of emphysema      Hoarseness      HTN, goal below 140/90      Hyperlipidemia LDL goal <130      Liver disease     \"fatty liver\"     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)      PONV (postoperative nausea and vomiting)      Rectal bleeding      Past Surgical History:   Procedure Laterality Date     ARTHROSCOPY SHOULDER DECOMPRESSION Left 10/21/2015    Procedure: ARTHROSCOPY SHOULDER DECOMPRESSION;  Surgeon: Julien Milian MD;  Location: [facility] OR     BIOPSY ARTERY TEMPORAL Left 3/11/2020    Procedure: LEFT TEMPORAL ARTERY BIOPSY;  Surgeon: Ibeth Conner MD;  Location: [facility] OR     BRONCHIAL THERMOPLASTY N/A 11/14/2014    Procedure: BRONCHIAL THERMOPLASTY;  Surgeon: Ward Whitaker MD;  Location: [facility] GI     BRONCHIAL THERMOPLASTY N/A 12/19/2014    Procedure: BRONCHIAL THERMOPLASTY;  Surgeon: Ward Whitaker MD;  [facility]: [facility] OR     BRONCHIAL THERMOPLASTY N/A 2/6/2015    Procedure: BRONCHIAL THERMOPLASTY;  Surgeon: Ward Whitaker MD;  Location: [facility] OR     C APPENDECTOMY  at age 17     COLONOSCOPY N/A 11/26/2018    Procedure: COLONOSCOPY ([facility]);  Surgeon: Marshall Oakes MD;  Location: [facility] OR     COLONOSCOPY N/A 10/22/2020    Procedure: Colonoscopy;  " Surgeon: Marshall Mccormick MD;  Location:  OR     DISCECTOMY, FUSION CERVICAL ANTERIOR ONE LEVEL, COMBINED N/A 5/1/2018    Procedure: COMBINED DISCECTOMY, FUSION CERVICAL ANTERIOR ONE LEVEL;  1.  C5-C6 anterior cervical diskectomy and fusion.    2.  C5-C6 application of intervertebral biomechanical device for interbody fusion purposes.    3.  C5-C6 anterior instrumentation using the standard Kristin InViZia 24 mm plate with four associated bone screws, 12 mm in length.  Inferior screws are fixed.  Superior screws are va     ENT SURGERY  2015    polyps removed from vocal cords      ESOPHAGOSCOPY, GASTROSCOPY, DUODENOSCOPY (EGD), COMBINED N/A 10/22/2020    Procedure: Esophagoscopy, gastroscopy, duodenoscopy with biopsies;  Surgeon: Marshall Mccormick MD;  Location:  OR     ESOPHAGOSCOPY, GASTROSCOPY, DUODENOSCOPY (EGD), COMBINED N/A 6/17/2021    Procedure: ESOPHAGOGASTRODUODENOSCOPY, WITH BIOPSY;  Surgeon: Darrel Damon MD;  Location:  GI     EXCISE NODE MEDIASTINAL  4/26/2013    Procedure: EXCISE NODE MEDIASTINAL;;  Surgeon: Av Peña MD;  Location:  OR     LAPAROSCOPIC CHOLECYSTECTOMY N/A 10/19/2017    Procedure: LAPAROSCOPIC CHOLECYSTECTOMY;  LAPAROSCOPIC CHOLECYSTECTOMY;  Surgeon: Ney Jerry MD;  Location:  OR     THORACOSCOPY  4/26/2013    Procedure: THORACOSCOPY;  LEFT VIDEO ASSISTED THORACOSCOPY, RESECTION OF POSTERIOR MEDIASTINAL MASS;  Surgeon: Av Peña MD;  Location:  OR     TONSILLECTOMY  as a kid     TONSILLECTOMY       Current Outpatient Medications   Medication Sig Dispense Refill     albuterol (PROVENTIL) (2.5 MG/3ML) 0.083% neb solution Take 1 vial (2.5 mg) by nebulization every 6 hours as needed for shortness of breath / dyspnea or wheezing 25 vial 3     atorvastatin (LIPITOR) 80 MG tablet TAKE ONE TABLET BY MOUTH EVERY DAY 90 tablet 0     benzoyl peroxide (ACNE-CLEAR) 10 % external gel Apply topically 2 times daily as needed       blood glucose  (ACCU-CHEK ALLYSON PLUS) test strip USE TO TEST BLOOD SUGAR ONE TIME DAILY OR AS DIRECTED 100 each 0     blood glucose (NO BRAND SPECIFIED) lancets standard Use to test blood sugar 1 times daily or as directed. 100 each 3     blood glucose (NO BRAND SPECIFIED) lancets standard Use to test blood sugar 1 time daily 100 each 1     blood glucose (NO BRAND SPECIFIED) test strip Use to test blood sugar 1 times daily or as directed.  Dispense Accu-chek Allyson Plus or per patient insurance 100 strip 3     blood glucose monitoring (NO BRAND SPECIFIED) meter device kit Use to test blood sugar 1 times daily or as directed.  Dispense Accu-chek Allyson Plus or per insurance preference 1 kit 0     blood glucose monitoring (NO BRAND SPECIFIED) meter device kit Use to test blood sugar 1 times daily or as directed. 1 kit 0     buPROPion (WELLBUTRIN XL) 150 MG 24 hr tablet TAKE ONE TABLET BY MOUTH EVERY MORNING 90 tablet 2     cetirizine HCl 10 MG CAPS Take 1 capsule (10 mg) by mouth daily as needed Takes in the spring. 90 capsule 3     clindamycin 1 % EX external gel Apply topically 2 times daily 60 g 3     clonazePAM (KLONOPIN) 1 MG tablet Take 1 tablet (1 mg) by mouth 2 times daily as needed for anxiety She needs to see her PCP to get more tablets 5 tablet 0     cyclobenzaprine (FLEXERIL) 5 MG tablet Take 1 tablet (5 mg) by mouth 3 times daily as needed for muscle spasms 30 tablet 1     dicyclomine (BENTYL) 20 MG tablet Take 1 tablet (20 mg) by mouth 3 times daily as needed (abdominal pain/cramping) 90 tablet 3     DULoxetine (CYMBALTA) 20 MG capsule Take 1 capsule (20 mg) by mouth daily X 7 days and if well tolerated increase to 40 mg daily. 53 capsule 1     fluticasone (FLONASE) 50 MCG/ACT nasal spray Spray 2 sprays in nostril daily 16 g 5     Fluticasone-Umeclidin-Vilanterol (TRELEGY ELLIPTA) 100-62.5-25 MCG/INH oral inhaler Inhale 1 puff into the lungs daily 28 each 3     furosemide (LASIX) 20 MG tablet Take 1 tablet (20 mg) by  mouth 2 times daily 180 tablet 0     glipiZIDE (GLUCOTROL XL) 2.5 MG 24 hr tablet TAKE ONE TABLET BY MOUTH EVERY DAY 90 tablet 1     hydrocortisone 2.5 % cream APPLY TOPICALLY TO THE AFFECTED AREA(S) TWO TIMES A DAY 30 g 1     hydrOXYzine (ATARAX) 50 MG tablet TAKE TWO TABLETS BY MOUTH THREE TIMES A DAY AS NEEDED FOR ANXIETY MAX OF 70 TABLETS PER WEEK 70 tablet 5     lamoTRIgine (LAMICTAL) 150 MG tablet Take 150 mg by mouth daily       lisinopril (ZESTRIL) 20 MG tablet TAKE ONE TABLET BY MOUTH EVERY DAY 90 tablet 0     metFORMIN (GLUCOPHAGE) 500 MG tablet TAKE ONE TABLET BY MOUTH EVERY DAY WITH BREAKFAST 90 tablet 1     methocarbamol (ROBAXIN) 500 MG tablet Take 1 tablet (500 mg) by mouth 3 times daily as needed for muscle spasms 120 tablet 0     montelukast (SINGULAIR) 10 MG tablet Take 1 tablet (10 mg) by mouth At Bedtime 90 tablet 4     nicotine (NICODERM CQ) 21 MG/24HR 24 hr patch Place 1 patch onto the skin every 24 hours 30 patch 1     nicotine (NICORETTE) 2 MG gum Place 1 each (2 mg) inside cheek as needed for smoking cessation 150 each 1     nicotine polacrilex (RA NICOTINE GUM) 4 MG gum Place 1 each (4 mg) inside cheek every 2 hours as needed for smoking cessation 30 each 3     nitroGLYcerin (NITROSTAT) 0.4 MG sublingual tablet PLACE ONE TABLET UNDER THE TONGUE AT THE 1ST SIGN OF ATTACK. IF PAIN IS UNRELIEVED OR WORSENED 5 MINUTES AFTER 1ST DOSE, PROMPT MEDICAL ASSISTANCE IS NEEDED. MAY REPEAT EVERY 5 MINUTES UNTIL PAIN IS RELIEVED. MAX OF THREE DOSES 25 tablet 3     nystatin (MYCOSTATIN) 157499 UNIT/GM external powder APPLY TOPICALLY TWICE A DAY AS NEEDED SKIN RASH 45 g 3     nystatin (MYCOSTATIN) 035466 UNIT/ML suspension TAKE 5 ML BY MOUTH FOUR TIMES A  mL 0     omeprazole (PRILOSEC) 40 MG DR capsule TAKE ONE CAPSULE BY MOUTH TWICE A  capsule 0     omeprazole 20 MG tablet TAKE ONE TABLET BY MOUTH TWICE A DAY 60 tablet 0     ondansetron (ZOFRAN) 8 MG tablet TAKE ONE-HALF TO ONE TABLET BY  MOUTH EVERY 8 HOURS AS NEEDED FOR NAUSEA 30 tablet 3     polyethylene glycol (MIRALAX) 17 GM/Dose powder Take 17 g (1 capful) by mouth daily 507 g 1     prochlorperazine (COMPAZINE) 10 MG tablet Take 1 tablet (10 mg) by mouth every 8 hours as needed for nausea or vomiting 30 tablet 0     sulfamethoxazole-trimethoprim (BACTRIM DS) 800-160 MG tablet Take 1 tablet by mouth 2 times daily Take one tablet twice daily. For additional refills, please schedule a follow-up appointment. 180 tablet 1     terbinafine (LAMISIL) 1 % external cream Apply topically 2 times daily 30 g 2     VENTOLIN  (90 Base) MCG/ACT inhaler INHALE TWO PUFFS BY MOUTH EVERY 6 HOURS AS NEEDED FOR SHORTNESS OF BREATH 18 g 1     order for DME Please provide oximeter 1 Device 0     order for DME Equipment being ordered: Digital home blood pressure monitor kit 1 Device 0     order for DME Equipment being ordered: Pulse Oxymeter 1 Device 0     sucralfate (CARAFATE) 1 GM tablet Take 1 tablet (1 g) by mouth 4 times daily 120 tablet 1     sucralfate (CARAFATE) 1 GM/10ML suspension Take 10 mLs (1 g) by mouth 4 times daily as needed for nausea (Pain) 420 mL 0     valACYclovir (VALTREX) 1000 mg tablet Take 2 tablets (2,000 mg) by mouth 2 times daily for 1 day 4 tablet 0       Allergies   Allergen Reactions     Codeine Nausea and Vomiting     vomiting     Cyclobenzaprine Nausea     Gabapentin Rash     Hydrocodone Nausea and Vomiting     Sulfa Drugs Nausea and Vomiting     Nausea        Social History     Tobacco Use     Smoking status: Former Smoker     Packs/day: 0.50     Years: 30.00     Pack years: 15.00     Types: Cigarettes     Start date: 1985     Quit date: 2021     Years since quittin.0     Smokeless tobacco: Never Used     Tobacco comment: Is trying to quit, using nicotine gum and patch   Substance Use Topics     Alcohol use: No     Alcohol/week: 0.0 standard drinks       History   Drug Use No         Objective     Physical  "Exam    Patient is alert, oriented, cooperative in no acute distress.  /64   Pulse 104   Temp 98.3  F (36.8  C) (Oral)   Resp 20   Ht 1.626 m (5' 4\")   Wt 109.3 kg (241 lb)   LMP 04/15/2016 (Exact Date)   SpO2 96%   BMI 41.37 kg/m      HEENT: PERRL, EOMI, TM's are normal. Oropharynx is clear.  NECK: No lymphadenopathy or thyromegaly. Carotid pulses full without bruits.  LUNGS: decreased breath sounds, soft wheezes  CV: normal S1, S2 without murmur, S3 or S4 present. Pulses are 2/2 throughout. No JVD.  ABDOMEN: nondistended, non tender, no masses or hepatosplenomegaly  EXTREMITIES: no edema present, unremarkable joints  NEUROLOGIC: Cranial nerves II-XII intact, reflexes 2/4 throughout, strength 5/5 except right upper extremity due to pain      Recent Labs   Lab Test 05/22/21  0151 05/12/21  1733 11/21/20  0628 11/20/20  0622 10/16/20  1507 09/29/20  1527 09/29/20  1527 05/18/20  1819 02/20/20  0920   HGB 12.4 13.8   < > 11.6* 12.6   < > 12.3   < > 14.5    387   < > 374  --    < > 351   < > 386   INR  --   --   --  0.99  --   --  0.94  --   --     136   < > 136  --    < > 137   < > 141   POTASSIUM 3.6 3.8   < > 4.3 4.8   < > 4.6   < > 4.3   CR 1.01 0.98   < > 0.91  --    < > 1.07*   < > 0.71   A1C  --   --   --   --  5.6  --   --   --  6.0*    < > = values in this interval not displayed.        Diagnostics:  Labs Potassium 4.2   EKG: Normal Sinus Rhythm, normal axis, normal intervals, no acute ST/T changes c/w ischemia, no LVH by voltage criteria, unchanged from previous tracings    Revised Cardiac Risk Index (RCRI):  The patient has the following serious cardiovascular risks for perioperative complications:   - No serious cardiac risks = 0 points     RCRI Interpretation: 0 points: Class I (very low risk - 0.4% complication rate)           Signed Electronically by: Divya Malcolm MD  Copy of this evaluation report is provided to requesting physician.    -op  "

## 2021-09-30 ENCOUNTER — LAB (OUTPATIENT)
Dept: LAB | Facility: CLINIC | Age: 51
End: 2021-09-30
Payer: COMMERCIAL

## 2021-09-30 DIAGNOSIS — Z01.818 PRE-OP EXAM: ICD-10-CM

## 2021-09-30 DIAGNOSIS — M54.12 CERVICAL RADICULOPATHY: ICD-10-CM

## 2021-09-30 PROCEDURE — 36415 COLL VENOUS BLD VENIPUNCTURE: CPT

## 2021-09-30 PROCEDURE — 84132 ASSAY OF SERUM POTASSIUM: CPT

## 2021-10-01 ENCOUNTER — TELEPHONE (OUTPATIENT)
Dept: INTERNAL MEDICINE | Facility: CLINIC | Age: 51
End: 2021-10-01

## 2021-10-01 DIAGNOSIS — L30.9 ECZEMA, UNSPECIFIED TYPE: ICD-10-CM

## 2021-10-01 LAB — POTASSIUM BLD-SCNC: 4.2 MMOL/L (ref 3.4–5.3)

## 2021-10-01 RX ORDER — HYDROCORTISONE 2.5 %
CREAM (GRAM) TOPICAL
Qty: 28 G | Refills: 1 | Status: SHIPPED | OUTPATIENT
Start: 2021-10-01 | End: 2021-11-04

## 2021-10-01 NOTE — TELEPHONE ENCOUNTER
Pending Prescriptions:                       Disp   Refills    hydrocortisone 2.5 % cream [Pharmacy Med N*28 g   1        Sig: APPLY TOPICALLY TO THE AFFECTED AREA(S) TWO TIMES A           DAY  Routing refill request to provider for review/approval because:  Drug not on the [facility] refill protocol

## 2021-10-01 NOTE — TELEPHONE ENCOUNTER
Ajit from [facility] ([phone]) calls for veerbal  Recertification of Skilled nursing 1wk for 60 days.  Ok to leave a detailed message.

## 2021-10-04 ENCOUNTER — LAB (OUTPATIENT)
Dept: LAB | Facility: CLINIC | Age: 51
End: 2021-10-04
Attending: NURSE PRACTITIONER
Payer: COMMERCIAL

## 2021-10-04 DIAGNOSIS — Z11.59 ENCOUNTER FOR SCREENING FOR OTHER VIRAL DISEASES: ICD-10-CM

## 2021-10-04 PROCEDURE — U0003 INFECTIOUS AGENT DETECTION BY NUCLEIC ACID (DNA OR RNA); SEVERE ACUTE RESPIRATORY SYNDROME CORONAVIRUS 2 (SARS-COV-2) (CORONAVIRUS DISEASE [COVID-19]), AMPLIFIED PROBE TECHNIQUE, MAKING USE OF HIGH THROUGHPUT TECHNOLOGIES AS DESCRIBED BY CMS-2020-01-R: HCPCS

## 2021-10-04 PROCEDURE — U0005 INFEC AGEN DETEC AMPLI PROBE: HCPCS

## 2021-10-05 LAB — SARS-COV-2 RNA RESP QL NAA+PROBE: NEGATIVE

## 2021-10-06 ENCOUNTER — OFFICE VISIT (OUTPATIENT)
Dept: INTERNAL MEDICINE | Facility: CLINIC | Age: 51
End: 2021-10-06
Payer: COMMERCIAL

## 2021-10-06 VITALS
HEIGHT: 63 IN | WEIGHT: 243 LBS | DIASTOLIC BLOOD PRESSURE: 74 MMHG | SYSTOLIC BLOOD PRESSURE: 134 MMHG | TEMPERATURE: 98.1 F | BODY MASS INDEX: 43.05 KG/M2 | OXYGEN SATURATION: 97 % | RESPIRATION RATE: 18 BRPM | HEART RATE: 90 BPM

## 2021-10-06 DIAGNOSIS — M54.2 NECK PAIN: ICD-10-CM

## 2021-10-06 DIAGNOSIS — D17.30 LIPOMA OF SKIN AND SUBCUTANEOUS TISSUE: Primary | ICD-10-CM

## 2021-10-06 PROCEDURE — 99213 OFFICE O/P EST LOW 20 MIN: CPT | Performed by: FAMILY MEDICINE

## 2021-10-06 RX ORDER — CYCLOBENZAPRINE HCL 10 MG
10 TABLET ORAL 2 TIMES DAILY PRN
Qty: 25 TABLET | Refills: 0 | Status: SHIPPED | OUTPATIENT
Start: 2021-10-06 | End: 2021-10-25

## 2021-10-06 ASSESSMENT — PAIN SCALES - GENERAL: PAINLEVEL: EXTREME PAIN (8)

## 2021-10-06 ASSESSMENT — MIFFLIN-ST. JEOR: SCORE: 1681.37

## 2021-10-06 NOTE — PROGRESS NOTES
"    (D17.30) Lipoma of skin and subcutaneous tissue  (primary encounter diagnosis)  Comment:   Location is above left trapezius.  Getting some pain up into her neck.  Seems quite anxious about this.  Plan: Adult General Surg Referral            (M54.2) Neck pain  Comment:   Having a concurrent problem with cervical radiculopathy on the right.  Has an MRI tomorrow.  Plan: cyclobenzaprine (FLEXERIL) 10 MG tablet                Subjective   Swetha is a 51 year old who presents for the following health issues  accompanied by her friend: patient reports mass on left shoulder that has increased in size and location.    HPI     Patient has a soft lump in the subcutaneous tissue left neck/shoulder area.  It is bothersome to her sending pain up into her neck.    Also under investigation currently is right cervical radiculopathy.  An MRI is scheduled for October 7.  She has apparently had a previous neck surgery.      Review of Systems     Unremarkable except as above.        Objective    /74   Pulse 90   Temp 98.1  F (36.7  C) (Tympanic)   Resp 18   Ht 1.6 m (5' 3\")   Wt 110.2 kg (243 lb)   LMP 04/15/2016 (Exact Date)   SpO2 97%   Breastfeeding No   BMI 43.05 kg/m    Body mass index is 43.05 kg/m .  Physical Exam     Neck -no masses palpable  Left trapezius region -a soft round subcutaneous mass is present about 5 cm diameter.  No overlying redness.        "

## 2021-10-07 ENCOUNTER — HOSPITAL ENCOUNTER (OUTPATIENT)
Dept: MRI IMAGING | Facility: CLINIC | Age: 51
End: 2021-10-07
Attending: NURSE PRACTITIONER | Admitting: INTERNAL MEDICINE
Payer: COMMERCIAL

## 2021-10-07 ENCOUNTER — ANESTHESIA (OUTPATIENT)
Dept: MRI IMAGING | Facility: CLINIC | Age: 51
End: 2021-10-07

## 2021-10-07 ENCOUNTER — HOSPITAL ENCOUNTER (OUTPATIENT)
Facility: CLINIC | Age: 51
Discharge: HOME | End: 2021-10-07
Attending: INTERNAL MEDICINE | Admitting: INTERNAL MEDICINE
Payer: COMMERCIAL

## 2021-10-07 ENCOUNTER — ANESTHESIA (OUTPATIENT)
Dept: SURGERY | Facility: CLINIC | Age: 51
End: 2021-10-07
Payer: COMMERCIAL

## 2021-10-07 ENCOUNTER — ANESTHESIA EVENT (OUTPATIENT)
Dept: SURGERY | Facility: CLINIC | Age: 51
End: 2021-10-07
Payer: COMMERCIAL

## 2021-10-07 VITALS
BODY MASS INDEX: 43.05 KG/M2 | DIASTOLIC BLOOD PRESSURE: 84 MMHG | RESPIRATION RATE: 16 BRPM | OXYGEN SATURATION: 95 % | HEART RATE: 81 BPM | SYSTOLIC BLOOD PRESSURE: 100 MMHG | TEMPERATURE: 97 F | HEIGHT: 63 IN | WEIGHT: 243 LBS

## 2021-10-07 DIAGNOSIS — Z98.1 S/P CERVICAL SPINAL FUSION: ICD-10-CM

## 2021-10-07 DIAGNOSIS — M54.12 CERVICAL RADICULOPATHY: ICD-10-CM

## 2021-10-07 LAB
CREAT SERPL-MCNC: 0.94 MG/DL (ref 0.52–1.04)
GFR SERPL CREATININE-BSD FRML MDRD: 70 ML/MIN/1.73M2
GLUCOSE BLDC GLUCOMTR-MCNC: 105 MG/DL (ref 70–99)
GLUCOSE BLDC GLUCOMTR-MCNC: 106 MG/DL (ref 70–99)

## 2021-10-07 PROCEDURE — 82565 ASSAY OF CREATININE: CPT | Performed by: ANESTHESIOLOGY

## 2021-10-07 PROCEDURE — 72141 MRI NECK SPINE W/O DYE: CPT

## 2021-10-07 PROCEDURE — 370N000017 HC ANESTHESIA TECHNICAL FEE, PER MIN

## 2021-10-07 PROCEDURE — 710N000012 HC RECOVERY PHASE 2, PER MINUTE

## 2021-10-07 PROCEDURE — 82962 GLUCOSE BLOOD TEST: CPT

## 2021-10-07 PROCEDURE — 250N000011 HC RX IP 250 OP 636: Performed by: NURSE ANESTHETIST, CERTIFIED REGISTERED

## 2021-10-07 PROCEDURE — 250N000009 HC RX 250: Performed by: NURSE ANESTHETIST, CERTIFIED REGISTERED

## 2021-10-07 PROCEDURE — 258N000003 HC RX IP 258 OP 636: Performed by: NURSE ANESTHETIST, CERTIFIED REGISTERED

## 2021-10-07 PROCEDURE — 999N000141 HC STATISTIC PRE-PROCEDURE NURSING ASSESSMENT

## 2021-10-07 PROCEDURE — 36415 COLL VENOUS BLD VENIPUNCTURE: CPT | Performed by: ANESTHESIOLOGY

## 2021-10-07 PROCEDURE — 250N000009 HC RX 250: Performed by: ANESTHESIOLOGY

## 2021-10-07 RX ORDER — OXYCODONE HYDROCHLORIDE 5 MG/1
5 TABLET ORAL EVERY 4 HOURS PRN
Status: DISCONTINUED | OUTPATIENT
Start: 2021-10-07 | End: 2021-10-07 | Stop reason: HOSPADM

## 2021-10-07 RX ORDER — PROPOFOL 10 MG/ML
INJECTION, EMULSION INTRAVENOUS PRN
Status: DISCONTINUED | OUTPATIENT
Start: 2021-10-07 | End: 2021-10-07

## 2021-10-07 RX ORDER — LIDOCAINE 40 MG/G
CREAM TOPICAL
Status: DISCONTINUED | OUTPATIENT
Start: 2021-10-07 | End: 2021-10-07 | Stop reason: HOSPADM

## 2021-10-07 RX ORDER — FENTANYL CITRATE 50 UG/ML
INJECTION, SOLUTION INTRAMUSCULAR; INTRAVENOUS PRN
Status: DISCONTINUED | OUTPATIENT
Start: 2021-10-07 | End: 2021-10-07

## 2021-10-07 RX ORDER — SCOLOPAMINE TRANSDERMAL SYSTEM 1 MG/1
1 PATCH, EXTENDED RELEASE TRANSDERMAL ONCE
Status: DISCONTINUED | OUTPATIENT
Start: 2021-10-07 | End: 2021-10-07 | Stop reason: HOSPADM

## 2021-10-07 RX ORDER — PROPOFOL 10 MG/ML
INJECTION, EMULSION INTRAVENOUS CONTINUOUS PRN
Status: DISCONTINUED | OUTPATIENT
Start: 2021-10-07 | End: 2021-10-07

## 2021-10-07 RX ORDER — ONDANSETRON 2 MG/ML
4 INJECTION INTRAMUSCULAR; INTRAVENOUS EVERY 30 MIN PRN
Status: DISCONTINUED | OUTPATIENT
Start: 2021-10-07 | End: 2021-10-07 | Stop reason: HOSPADM

## 2021-10-07 RX ORDER — SODIUM CHLORIDE, SODIUM LACTATE, POTASSIUM CHLORIDE, CALCIUM CHLORIDE 600; 310; 30; 20 MG/100ML; MG/100ML; MG/100ML; MG/100ML
INJECTION, SOLUTION INTRAVENOUS CONTINUOUS PRN
Status: DISCONTINUED | OUTPATIENT
Start: 2021-10-07 | End: 2021-10-07

## 2021-10-07 RX ORDER — FENTANYL CITRATE 50 UG/ML
25 INJECTION, SOLUTION INTRAMUSCULAR; INTRAVENOUS
Status: DISCONTINUED | OUTPATIENT
Start: 2021-10-07 | End: 2021-10-07 | Stop reason: HOSPADM

## 2021-10-07 RX ORDER — ONDANSETRON 2 MG/ML
INJECTION INTRAMUSCULAR; INTRAVENOUS PRN
Status: DISCONTINUED | OUTPATIENT
Start: 2021-10-07 | End: 2021-10-07

## 2021-10-07 RX ORDER — ONDANSETRON 4 MG/1
4 TABLET, ORALLY DISINTEGRATING ORAL EVERY 30 MIN PRN
Status: DISCONTINUED | OUTPATIENT
Start: 2021-10-07 | End: 2021-10-07 | Stop reason: HOSPADM

## 2021-10-07 RX ORDER — SODIUM CHLORIDE, SODIUM LACTATE, POTASSIUM CHLORIDE, CALCIUM CHLORIDE 600; 310; 30; 20 MG/100ML; MG/100ML; MG/100ML; MG/100ML
INJECTION, SOLUTION INTRAVENOUS CONTINUOUS
Status: DISCONTINUED | OUTPATIENT
Start: 2021-10-07 | End: 2021-10-07 | Stop reason: HOSPADM

## 2021-10-07 RX ORDER — DEXAMETHASONE SODIUM PHOSPHATE 4 MG/ML
INJECTION, SOLUTION INTRA-ARTICULAR; INTRALESIONAL; INTRAMUSCULAR; INTRAVENOUS; SOFT TISSUE PRN
Status: DISCONTINUED | OUTPATIENT
Start: 2021-10-07 | End: 2021-10-07

## 2021-10-07 RX ORDER — MEPERIDINE HYDROCHLORIDE 25 MG/ML
12.5 INJECTION INTRAMUSCULAR; INTRAVENOUS; SUBCUTANEOUS
Status: DISCONTINUED | OUTPATIENT
Start: 2021-10-07 | End: 2021-10-07 | Stop reason: HOSPADM

## 2021-10-07 RX ORDER — FENTANYL CITRATE 50 UG/ML
25 INJECTION, SOLUTION INTRAMUSCULAR; INTRAVENOUS EVERY 5 MIN PRN
Status: DISCONTINUED | OUTPATIENT
Start: 2021-10-07 | End: 2021-10-07 | Stop reason: HOSPADM

## 2021-10-07 RX ADMIN — DEXAMETHASONE SODIUM PHOSPHATE 4 MG: 4 INJECTION, SOLUTION INTRA-ARTICULAR; INTRALESIONAL; INTRAMUSCULAR; INTRAVENOUS; SOFT TISSUE at 08:25

## 2021-10-07 RX ADMIN — ONDANSETRON HYDROCHLORIDE 4 MG: 2 INJECTION, SOLUTION INTRAVENOUS at 07:30

## 2021-10-07 RX ADMIN — PROPOFOL 100 MCG/KG/MIN: 10 INJECTION, EMULSION INTRAVENOUS at 07:30

## 2021-10-07 RX ADMIN — FENTANYL CITRATE 100 MCG: 50 INJECTION, SOLUTION INTRAMUSCULAR; INTRAVENOUS at 07:27

## 2021-10-07 RX ADMIN — PROPOFOL 200 MG: 10 INJECTION, EMULSION INTRAVENOUS at 07:30

## 2021-10-07 RX ADMIN — SODIUM CHLORIDE, POTASSIUM CHLORIDE, SODIUM LACTATE AND CALCIUM CHLORIDE: 600; 310; 30; 20 INJECTION, SOLUTION INTRAVENOUS at 07:25

## 2021-10-07 RX ADMIN — SCOPALAMINE 1 PATCH: 1 PATCH, EXTENDED RELEASE TRANSDERMAL at 07:22

## 2021-10-07 RX ADMIN — MIDAZOLAM 2 MG: 1 INJECTION INTRAMUSCULAR; INTRAVENOUS at 07:27

## 2021-10-07 ASSESSMENT — COPD QUESTIONNAIRES
CAT_SEVERITY: MODERATE
COPD: 1

## 2021-10-07 ASSESSMENT — LIFESTYLE VARIABLES: TOBACCO_USE: 1

## 2021-10-07 ASSESSMENT — MIFFLIN-ST. JEOR: SCORE: 1681.24

## 2021-10-07 NOTE — ANESTHESIA POSTPROCEDURE EVALUATION
Patient: Swetha Patterson    Procedure: Procedure(s):  ANESTHESIA OUT OF OR MRI       Diagnosis:Radiculopathy, cervical [M54.12]  Arthrodesis status [Z98.1]  Diagnosis Additional Information: No value filed.    Anesthesia Type:  General    Note:     Postop Pain Control: Uneventful            Sign Out: Well controlled pain   PONV: No   Neuro/Psych: Uneventful            Sign Out: Acceptable/Baseline neuro status   Airway/Respiratory: Uneventful            Sign Out: Acceptable/Baseline resp. status   CV/Hemodynamics: Uneventful            Sign Out: Acceptable CV status   Other NRE: NONE   DID A NON-ROUTINE EVENT OCCUR? No           Last vitals:  Vitals Value Taken Time   BP     Temp     Pulse     Resp     SpO2         Electronically Signed By: Marshall Candelaria MD  October 7, 2021  3:40 PM

## 2021-10-07 NOTE — ANESTHESIA PREPROCEDURE EVALUATION
"Anesthesia Pre-Procedure Evaluation    Patient: Swetha Patterson   MRN: [id number] : 1970        Preoperative Diagnosis: Radiculopathy, cervical [M54.12]  Arthrodesis status [Z98.1]    Procedure : Procedure(s):  ANESTHESIA OUT OF OR MRI          Past Medical History:   Diagnosis Date     Anxiety state, unspecified      Asthma      Chronic pain     back pain from cyst     Contact dermatitis and other eczema, due to unspecified cause      COPD (chronic obstructive pulmonary disease) (H)      Depressive disorder, not elsewhere classified      Diabetes (H)      Emphysema with chronic bronchitis (H)      Esophageal reflux      Family history of ischemic heart disease      Fibromyalgia      Gastro-oesophageal reflux disease      Heart disease     has chest pain sometimes     History of emphysema      Hoarseness      HTN, goal below 140/90      Hyperlipidemia LDL goal <130      Liver disease     \"fatty liver\"     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)      PONV (postoperative nausea and vomiting)      Rectal bleeding       Past Surgical History:   Procedure Laterality Date     ARTHROSCOPY SHOULDER DECOMPRESSION Left 10/21/2015    Procedure: ARTHROSCOPY SHOULDER DECOMPRESSION;  Surgeon: Julien Milian MD;  Location: [facility]     BIOPSY ARTERY TEMPORAL Left 3/11/2020    Procedure: LEFT TEMPORAL ARTERY BIOPSY;  Surgeon: Ibeth Conner MD;  Location: [facility]     BRONCHIAL THERMOPLASTY N/A 2014    Procedure: BRONCHIAL THERMOPLASTY;  Surgeon: Ward Whitaker MD;  Location: [facility]     BRONCHIAL THERMOPLASTY N/A 2014    Procedure: BRONCHIAL THERMOPLASTY;  Surgeon: Ward Whitaker MD;  [facility]     BRONCHIAL THERMOPLASTY N/A 2015    Procedure: BRONCHIAL THERMOPLASTY;  Surgeon: Ward Whitaker MD;  Location: [facility]     C APPENDECTOMY  at age 17     COLONOSCOPY N/A 2018    Procedure: COLONOSCOPY ([facility]);  Surgeon: Marshall Oakes MD;  Location: [facility]     COLONOSCOPY " N/A 10/22/2020    Procedure: Colonoscopy;  Surgeon: Marshall Mccormick MD;  Location: [facility]     DISCECTOMY, FUSION CERVICAL ANTERIOR ONE LEVEL, COMBINED N/A 5/1/2018    Procedure: COMBINED DISCECTOMY, FUSION CERVICAL ANTERIOR ONE LEVEL;  1.  C5-C6 anterior cervical diskectomy and fusion.    2.  C5-C6 application of intervertebral biomechanical device for interbody fusion purposes.    3.  C5-C6 anterior instrumentation using the standard Kristin InViZia 24 mm plate with four associated bone screws, 12 mm in length.  Inferior screws are fixed.  Superior screws are va     ENT SURGERY  2015    polyps removed from vocal cords      ESOPHAGOSCOPY, GASTROSCOPY, DUODENOSCOPY (EGD), COMBINED N/A 10/22/2020    Procedure: Esophagoscopy, gastroscopy, duodenoscopy with biopsies;  Surgeon: Marshall Mccormick MD;  Location:  OR     ESOPHAGOSCOPY, GASTROSCOPY, DUODENOSCOPY (EGD), COMBINED N/A 6/17/2021    Procedure: ESOPHAGOGASTRODUODENOSCOPY, WITH BIOPSY;  Surgeon: Darrel Damon MD;  Location:  GI     EXCISE NODE MEDIASTINAL  4/26/2013    Procedure: EXCISE NODE MEDIASTINAL;;  Surgeon: Av Peña MD;  Location:  OR     LAPAROSCOPIC CHOLECYSTECTOMY N/A 10/19/2017    Procedure: LAPAROSCOPIC CHOLECYSTECTOMY;  LAPAROSCOPIC CHOLECYSTECTOMY;  Surgeon: Ney Jerry MD;  Location:  OR     THORACOSCOPY  4/26/2013    Procedure: THORACOSCOPY;  LEFT VIDEO ASSISTED THORACOSCOPY, RESECTION OF POSTERIOR MEDIASTINAL MASS;  Surgeon: Av Peña MD;  Location:  OR     TONSILLECTOMY  as a kid     TONSILLECTOMY        Allergies   Allergen Reactions     Codeine Nausea and Vomiting     vomiting     Cyclobenzaprine Nausea     Gabapentin Rash     Hydrocodone Nausea and Vomiting     Sulfa Drugs Nausea and Vomiting     Nausea      Social History     Tobacco Use     Smoking status: Former Smoker     Packs/day: 0.50     Years: 30.00     Pack years: 15.00     Types: Cigarettes     Start date: 1/1/1985     Quit date:  2021     Years since quittin.0     Smokeless tobacco: Never Used     Tobacco comment: Is trying to quit, using nicotine gum and patch   Substance Use Topics     Alcohol use: No     Alcohol/week: 0.0 standard drinks      Wt Readings from Last 1 Encounters:   10/07/21 110.2 kg (243 lb)        Anesthesia Evaluation   Pt has had prior anesthetic. Type: General.    History of anesthetic complications  - PONV.      ROS/MED HX  ENT/Pulmonary:     (+) tobacco use, Current use, 1 packs/day, 30  Pack-Year Hx,  Moderate Persistent, asthma Treatment: Inhaler prn, Inhaler daily and Inhaled steroids,  moderate,  COPD,     Neurologic:     (+) peripheral neuropathy, - cervical radiculopathy.     Cardiovascular:     (+) Dyslipidemia hypertension-Peripheral Vascular Disease-- Non Symptomatic and Other. ---Previous cardiac testing   Echo: Date:  Results:     The visual ejection fraction is 55-60%.  Regional wall motion abnormalities cannot be excluded due to limited  visualization.  Contrast would enhance imaging of the apex. The study was technically  difficult.  Stress Test: Date: Results:    ECG Reviewed: Date: Results:    Cath: Date: Results:      METS/Exercise Tolerance:     Hematologic:  - neg hematologic  ROS     Musculoskeletal:   (+) arthritis,     GI/Hepatic:     (+) GERD, Asymptomatic on medication, hepatitis type Other, liver disease,     Renal/Genitourinary:  - neg Renal ROS     Endo: Comment: Class 3 obesity    (+) type I DM, Obesity,     Psychiatric/Substance Use:     (+) psychiatric history anxiety and depression     Infectious Disease:  - neg infectious disease ROS     Malignancy:  - neg malignancy ROS     Other:  - neg other ROS          Physical Exam    Airway        Mallampati: III   TM distance: > 3 FB   Neck ROM: full   Mouth opening: > 3 cm    Respiratory Devices and Support         Dental       (+) missing      Cardiovascular   cardiovascular exam normal       Rhythm and rate: regular and normal      Pulmonary   pulmonary exam normal        breath sounds clear to auscultation   (+) decreased breath sounds       Other findings: Lab Test        05/22/21     05/12/21     11/21/20     11/20/20     11/20/20     10/16/20     09/29/20     09/19/17     03/27/17                       0151          1733          0628          0622          0622          1507          1527          1630          1131          WBC          10.0         12.0*        6.6            < >        6.6           --          9.0            < >        9.8           HGB          12.4         13.8         10.0*          < >        11.6*          < >        12.3           < >        14.2          MCV          83           84           88             < >        87            --          85             < >        92            PLT          349          387          319            < >        374           --          351            < >        320           INR           --           --           --           --          0.99          --          0.94          --          0.90           < > = values in this interval not displayed.                  Lab Test        10/07/21     09/30/21     05/22/21     05/12/21     11/21/20     11/21/20                       0642          1203          0151          1733          0628          0628          NA            --           --          142          136           --          142           POTASSIUM     --          4.2          3.6          3.8            < >        4.0           CHLORIDE      --           --          108          105           --          112*          CO2           --           --          26           26            --          26            BUN           --           --          14           24            --          13            CR            --           --          1.01         0.98          --          1.05*         ANIONGAP      --           --          8            5             --           4             GENE           --           --          8.9          8.6           --          8.2*          GLC          106*          --          75           78             < >        101*           < > = values in this interval not displayed.                        EKG Interpretation:   Sinus  Rhythm   -RSR(V1) -nondiagnostic.     PROBABLY NORMAL    OUTSIDE LABS:  CBC:   Lab Results   Component Value Date    WBC 10.0 05/22/2021    WBC 12.0 (H) 05/12/2021    HGB 12.4 05/22/2021    HGB 13.8 05/12/2021    HCT 38.9 05/22/2021    HCT 44.0 05/12/2021     05/22/2021     05/12/2021     BMP:   Lab Results   Component Value Date     05/22/2021     05/12/2021    POTASSIUM 4.2 09/30/2021    POTASSIUM 3.6 05/22/2021    CHLORIDE 108 05/22/2021    CHLORIDE 105 05/12/2021    CO2 26 05/22/2021    CO2 26 05/12/2021    BUN 14 05/22/2021    BUN 24 05/12/2021    CR 1.01 05/22/2021    CR 0.98 05/12/2021     (H) 10/07/2021    GLC 75 05/22/2021     COAGS:   Lab Results   Component Value Date    INR 0.99 11/20/2020     POC:   Lab Results   Component Value Date    BGM 90 11/21/2020    HCG Negative 10/21/2015    HCGS Negative 05/12/2021     HEPATIC:   Lab Results   Component Value Date    ALBUMIN 3.7 05/22/2021    PROTTOTAL 7.4 05/22/2021    ALT 80 (H) 05/22/2021    AST 21 05/22/2021    ALKPHOS 154 (H) 05/22/2021    BILITOTAL 0.2 05/22/2021     OTHER:   Lab Results   Component Value Date    PH 7.45 11/18/2014    LACT 2.0 11/20/2020    A1C 5.6 10/16/2020    GENE 8.9 05/22/2021    MAG 1.9 03/19/2013    LIPASE 101 05/22/2021    TSH 1.48 05/20/2021    T4 0.80 02/13/2015    CRP 16.0 (H) 02/20/2020    SED 16 02/20/2020       Anesthesia Plan    ASA Status:  3      Anesthesia Type: General.     - Airway: LMA   Induction: Intravenous.   Maintenance: Balanced.        Consents    Anesthesia Plan(s) and associated risks, benefits, and realistic alternatives discussed. Questions answered and patient/representative(s)  expressed understanding.     - Discussed with:  Patient      - Extended Intubation/Ventilatory Support Discussed: No.      - Patient is DNR/DNI Status: No    Use of blood products discussed: No .     Postoperative Care    Pain management: IV analgesics.   PONV prophylaxis: Dexamethasone or Solumedrol, Ondansetron (or other 5HT-3)     Comments:                Tan Hernandez MD

## 2021-10-07 NOTE — ANESTHESIA CARE TRANSFER NOTE
Patient: Swetha Patterson    Procedure: Procedure(s):  ANESTHESIA OUT OF OR MRI       Diagnosis: Radiculopathy, cervical [M54.12]  Arthrodesis status [Z98.1]  Diagnosis Additional Information: No value filed.    Anesthesia Type:   General     Note:    Oropharynx: oropharynx clear of all foreign objects  Level of Consciousness: awake  Oxygen Supplementation: room air    Independent Airway: airway patency satisfactory and stable  Dentition: dentition unchanged  Vital Signs Stable: post-procedure vital signs reviewed and stable  Report to RN Given: handoff report given  Patient transferred to: PACU    Handoff Report: Identifed the Patient, Identified the Reponsible Provider, Reviewed the pertinent medical history, Discussed the surgical course, Reviewed Intra-OP anesthesia mangement and issues during anesthesia, Set expectations for post-procedure period and Allowed opportunity for questions and acknowledgement of understanding      Vitals:  Vitals Value Taken Time   BP 92/67 10/07/21 0840   Temp 98  F (36.7  C) 10/07/21 0830   Pulse 80 10/07/21 0840   Resp     SpO2 99 % 10/07/21 0844   Vitals shown include unvalidated device data.    Electronically Signed By: LEIGH Barreto CRNA  October 7, 2021  8:45 AM

## 2021-10-08 ENCOUNTER — TELEPHONE (OUTPATIENT)
Dept: INTERNAL MEDICINE | Facility: CLINIC | Age: 51
End: 2021-10-08

## 2021-10-08 ENCOUNTER — MYC MEDICAL ADVICE (OUTPATIENT)
Dept: INTERNAL MEDICINE | Facility: CLINIC | Age: 51
End: 2021-10-08

## 2021-10-08 DIAGNOSIS — R21 RASH AND NONSPECIFIC SKIN ERUPTION: Primary | ICD-10-CM

## 2021-10-08 NOTE — TELEPHONE ENCOUNTER
Taty from [facility]  States patient has bilateral leg rash   She has had this and states provider is aware.  Patient states it itches and can't get any relief    Using hydrocortisone cream and patient states no relief  Patient does not go outside so does not appear to be poison ivy or environmental allergy  Patient states can't take benadryl      Patient has been contacted to send pics in [employer].  Which has not been sent as of time of this message received  Would provider like to have virtual appointment?  Please advise

## 2021-10-08 NOTE — ADDENDUM NOTE
Addendum  created 10/08/21 0800 by Tan Hernandez MD    Attestation recorded in Intraprocedure, Clinical Note Signed, Intraprocedure Attestations filed, Intraprocedure Event edited

## 2021-10-11 DIAGNOSIS — Z53.9 DIAGNOSIS NOT YET DEFINED: Primary | ICD-10-CM

## 2021-10-11 PROCEDURE — G0179 MD RECERTIFICATION HHA PT: HCPCS | Performed by: INTERNAL MEDICINE

## 2021-10-12 ENCOUNTER — TELEPHONE (OUTPATIENT)
Dept: INTERNAL MEDICINE | Facility: CLINIC | Age: 51
End: 2021-10-12

## 2021-10-14 ENCOUNTER — PREP FOR PROCEDURE (OUTPATIENT)
Dept: SURGERY | Facility: CLINIC | Age: 51
End: 2021-10-14

## 2021-10-14 ENCOUNTER — TELEPHONE (OUTPATIENT)
Dept: SURGERY | Facility: CLINIC | Age: 51
End: 2021-10-14

## 2021-10-14 ENCOUNTER — OFFICE VISIT (OUTPATIENT)
Dept: SURGERY | Facility: CLINIC | Age: 51
End: 2021-10-14
Payer: COMMERCIAL

## 2021-10-14 ENCOUNTER — TELEPHONE (OUTPATIENT)
Dept: DERMATOLOGY | Facility: CLINIC | Age: 51
End: 2021-10-14

## 2021-10-14 ENCOUNTER — ANESTHESIA EVENT (OUTPATIENT)
Dept: MRI IMAGING | Facility: CLINIC | Age: 51
End: 2021-10-14

## 2021-10-14 VITALS
SYSTOLIC BLOOD PRESSURE: 100 MMHG | HEIGHT: 63 IN | OXYGEN SATURATION: 99 % | RESPIRATION RATE: 16 BRPM | HEART RATE: 81 BPM | BODY MASS INDEX: 43.05 KG/M2 | DIASTOLIC BLOOD PRESSURE: 84 MMHG | WEIGHT: 243 LBS

## 2021-10-14 DIAGNOSIS — D17.22 LIPOMA OF LEFT SHOULDER: Primary | ICD-10-CM

## 2021-10-14 DIAGNOSIS — Z11.59 ENCOUNTER FOR SCREENING FOR OTHER VIRAL DISEASES: ICD-10-CM

## 2021-10-14 DIAGNOSIS — D17.30 LIPOMA OF SKIN AND SUBCUTANEOUS TISSUE: Primary | ICD-10-CM

## 2021-10-14 PROCEDURE — 99213 OFFICE O/P EST LOW 20 MIN: CPT | Performed by: SURGERY

## 2021-10-14 ASSESSMENT — MIFFLIN-ST. JEOR: SCORE: 1681.37

## 2021-10-14 NOTE — LETTER
[facility]    [address], [address]  [place], [place] [address]  Phone [phone]  Fax [phone] [address], [address]  [facility] [place] [address]  Phone [phone]  Fax [phone]    [email or web address]     Swetha Patterson     You have been scheduled for a COVID-19 testing appointment at [facility].    10/30/2021 at 11:05 AM       The clinic is located at:  [address]  [address]    Once you arrive, follow the gold/yellow curbside testing signs.  [place] in the North lot (between the clinic and shopping center).      Stay in your vehicle until your appointment time then enter the clinic via the EAST door (not the main entrance).  There will be four steps to walk up.      Please note this appointment time is approximate, thank you in advance for your patience while you wait your turn to be tested!  Please wear a mask or face covering to the testing site.  Have your ID out and ready to show staff when you arrive.    Following your testing, you will be required to self-isolate until your surgery.  If you need a note for your employer due to this, please let us know.

## 2021-10-14 NOTE — TELEPHONE ENCOUNTER
Type of surgery: LEFT SHOULDER LIPOMA   Location of surgery: [facility]  Date and time of surgery: 11/3/2021 @ 10:00 AM   Surgeon: Ibeth Conner MD   Pre-Op Appt Date: PATIENT TO SCHEDULE    Post-Op Appt Date: PATIENT TO SCHEDULE     Packet sent out: Yes  Pre-cert/Authorization completed:  Not Applicable  Date: 10/14/2021      LEFT SHOULDER LIPOMA    GENERAL PT INST TO HAVE H&P WITH DR PASTOR 30 MIN REQ  NON MGB NMS

## 2021-10-14 NOTE — TELEPHONE ENCOUNTER
[facility] Call Center    Phone Message    May a detailed message be left on voicemail: yes     Reason for Call: Appointment Intake    Referring Provider Name: Roro Chawla MD in   Diagnosis and/or Symptoms: Rash - Pt is scheduled for 01/10 and wait listed. Pt's referral states Urgent 3-5 days. Please review and call Pt back if Pt can be scheduled at a sooner Appt date. Thank you    Action Taken: Message routed to:  [facility] Derm    Travel Screening: Not Applicable

## 2021-10-14 NOTE — LETTER
[facility]    [address], [address]  [place], [place] [address]  Phone [phone]  Fax [phone] [address], [address]  [facility] [place] [address]  Phone [phone]  Fax [phone]    [email or web address]   October 14, 2021    Swetha Patterson  [address]  [facility] [address]    We realize with scheduling surgery, one of your first questions is, how much will this cost?  Below we have provided you with the information you will need to receive an estimate for your surgery.    You are scheduled for the following procedure:  LEFT SHOULDER LIPOMA     Surgeon:  Ibeth Conner MD      Physician Assistant:  No      Please make sure to have your insurance card available at the time of calling.    Surgeon & Physician Assistant charges and facility charges for [facility], [facility] or [facility]:    Consumer Price Line at [phone]   -  It is important to note that there may be a Physician Assistant assisting with your surgery.  Please be sure to mention this when calling for the estimate.      If you prefer, you can also request a price estimate online by completing the secure form at:  [email or web address]    Facility Charges at [facility] [place], [facility] [place] or [facility]:  [facility] at [phone]  [facility] [place] at [phone]  [facility] at [phone] or [phone]    Anesthesiologist Charges:  [facility] at [phone]    [facility] - Nurse Anesthetist Charges:  [facility] at [phone]

## 2021-10-15 NOTE — TELEPHONE ENCOUNTER
sent [employer] message that there are no appts soon- she will need to wait for a cancellation or see UC/pcp.    Nena HALLRN BSN  [facility] [place]  [phone]

## 2021-10-18 ENCOUNTER — OFFICE VISIT (OUTPATIENT)
Dept: NEUROSURGERY | Facility: CLINIC | Age: 51
End: 2021-10-18
Attending: NEUROLOGICAL SURGERY
Payer: COMMERCIAL

## 2021-10-18 ENCOUNTER — TELEPHONE (OUTPATIENT)
Dept: INTERNAL MEDICINE | Facility: CLINIC | Age: 51
End: 2021-10-18

## 2021-10-18 ENCOUNTER — MYC MEDICAL ADVICE (OUTPATIENT)
Dept: INTERNAL MEDICINE | Facility: CLINIC | Age: 51
End: 2021-10-18

## 2021-10-18 VITALS
DIASTOLIC BLOOD PRESSURE: 75 MMHG | SYSTOLIC BLOOD PRESSURE: 108 MMHG | HEART RATE: 89 BPM | WEIGHT: 243 LBS | OXYGEN SATURATION: 98 % | BODY MASS INDEX: 43.05 KG/M2

## 2021-10-18 DIAGNOSIS — R29.898 RIGHT ARM WEAKNESS: Primary | ICD-10-CM

## 2021-10-18 PROCEDURE — 99213 OFFICE O/P EST LOW 20 MIN: CPT | Performed by: NEUROLOGICAL SURGERY

## 2021-10-18 PROCEDURE — G0463 HOSPITAL OUTPT CLINIC VISIT: HCPCS

## 2021-10-18 ASSESSMENT — PAIN SCALES - GENERAL: PAINLEVEL: SEVERE PAIN (7)

## 2021-10-18 NOTE — PROGRESS NOTES
"Swetha Patterson is a 51 year old female with history of C5-6 ACDF with Dr. Eric in 2018. She did well initially post op, but then developed neck pain and right arm pain about 6 months ago. Then 1 month ago she developed RUE weakness and difficulty with  strength. Denies any trauma or injuries. Describes the pain as aching. Pain is worsened with heavy lifting and activity. She has been doing home stretches. She recently saw [facility] last week, they obtained XR and recommended PT. She also had EMG two weeks ago at [facility] in [place]. Denies any falls or bladder/bowel incontinence.     Returns for follow up.  Continued neck pain, right arm pain with hand weakness, lancinating pain down the back, and right lateral thigh pain.  EMG was read as mild carpal tunnel.  New MRI did not shows high grade stenosis, mild right C5-6 foraminal stenosis.    Past Medical History:   Diagnosis Date     Anxiety state, unspecified      Asthma      Chronic pain     back pain from cyst     Contact dermatitis and other eczema, due to unspecified cause      COPD (chronic obstructive pulmonary disease) (H)      Depressive disorder, not elsewhere classified      Diabetes (H)      Emphysema with chronic bronchitis (H)      Esophageal reflux      Family history of ischemic heart disease      Fibromyalgia      Gastro-oesophageal reflux disease      Heart disease     has chest pain sometimes     History of emphysema      Hoarseness      HTN, goal below 140/90      Hyperlipidemia LDL goal <130      Liver disease     \"fatty liver\"     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)      PONV (postoperative nausea and vomiting)      Rectal bleeding        Past Medical History reviewed with patient during visit.    Past Surgical History:   Procedure Laterality Date     ANESTHESIA OUT OF OR MRI N/A 10/7/2021    Procedure: ANESTHESIA OUT OF OR MRI;  Surgeon: GENERIC ANESTHESIA PROVIDER;  Location: [facility]     ARTHROSCOPY SHOULDER DECOMPRESSION Left 10/21/2015    " Procedure: ARTHROSCOPY SHOULDER DECOMPRESSION;  Surgeon: Julien Milian MD;  Location: [facility] OR     BIOPSY ARTERY TEMPORAL Left 3/11/2020    Procedure: LEFT TEMPORAL ARTERY BIOPSY;  Surgeon: Ibeth Conner MD;  Location: [facility]     BRONCHIAL THERMOPLASTY N/A 11/14/2014    Procedure: BRONCHIAL THERMOPLASTY;  Surgeon: Ward Whitaker MD;  [facility]     BRONCHIAL THERMOPLASTY N/A 12/19/2014    Procedure: BRONCHIAL THERMOPLASTY;  Surgeon: Ward Whitaker MD;  [facility]     BRONCHIAL THERMOPLASTY N/A 2/6/2015    Procedure: BRONCHIAL THERMOPLASTY;  Surgeon: Ward Whitaker MD;  [facility]     C APPENDECTOMY  at age 17     COLONOSCOPY N/A 11/26/2018    Procedure: COLONOSCOPY ([facility]);  Surgeon: Marshall Oakes MD;  Location:  OR     COLONOSCOPY N/A 10/22/2020    Procedure: Colonoscopy;  Surgeon: Marshall Mccormick MD;  Location: [facility]     DISCECTOMY, FUSION CERVICAL ANTERIOR ONE LEVEL, COMBINED N/A 5/1/2018    Procedure: COMBINED DISCECTOMY, FUSION CERVICAL ANTERIOR ONE LEVEL;  1.  C5-C6 anterior cervical diskectomy and fusion.    2.  C5-C6 application of intervertebral biomechanical device for interbody fusion purposes.    3.  C5-C6 anterior instrumentation using the standard Kristin InViZia 24 mm plate with four associated bone screws, 12 mm in length.  Inferior screws are fixed.  Superior screws are va     ENT SURGERY  2015    polyps removed from vocal cords      ESOPHAGOSCOPY, GASTROSCOPY, DUODENOSCOPY (EGD), COMBINED N/A 10/22/2020    Procedure: Esophagoscopy, gastroscopy, duodenoscopy with biopsies;  Surgeon: Marshall Mccormick MD;  Location:  OR     ESOPHAGOSCOPY, GASTROSCOPY, DUODENOSCOPY (EGD), COMBINED N/A 6/17/2021    Procedure: ESOPHAGOGASTRODUODENOSCOPY, WITH BIOPSY;  Surgeon: Darrel Damon MD;  Location:  GI     EXCISE NODE MEDIASTINAL  4/26/2013    Procedure: EXCISE NODE MEDIASTINAL;;  Surgeon: Av Peña MD;  Location:  OR     LAPAROSCOPIC  CHOLECYSTECTOMY N/A 10/19/2017    Procedure: LAPAROSCOPIC CHOLECYSTECTOMY;  LAPAROSCOPIC CHOLECYSTECTOMY;  Surgeon: Ney Jerry MD;  Location: [facility]     THORACOSCOPY  4/26/2013    Procedure: THORACOSCOPY;  LEFT VIDEO ASSISTED THORACOSCOPY, RESECTION OF POSTERIOR MEDIASTINAL MASS;  Surgeon: Av Peña MD;  Location: [facility] OR     TONSILLECTOMY  as a kid     TONSILLECTOMY       Past Surgical History reviewed with patient during visit.    Current Outpatient Medications   Medication     albuterol (PROVENTIL) (2.5 MG/3ML) 0.083% neb solution     atorvastatin (LIPITOR) 80 MG tablet     benzoyl peroxide (ACNE-CLEAR) 10 % external gel     blood glucose (ACCU-CHEK ALLYSON PLUS) test strip     blood glucose (NO BRAND SPECIFIED) lancets standard     blood glucose (NO BRAND SPECIFIED) lancets standard     blood glucose (NO BRAND SPECIFIED) test strip     blood glucose monitoring (NO BRAND SPECIFIED) meter device kit     blood glucose monitoring (NO BRAND SPECIFIED) meter device kit     buPROPion (WELLBUTRIN XL) 150 MG 24 hr tablet     cetirizine HCl 10 MG CAPS     clindamycin 1 % EX external gel     clonazePAM (KLONOPIN) 1 MG tablet     cyclobenzaprine (FLEXERIL) 10 MG tablet     cyclobenzaprine (FLEXERIL) 5 MG tablet     dicyclomine (BENTYL) 20 MG tablet     DULoxetine (CYMBALTA) 20 MG capsule     fluticasone (FLONASE) 50 MCG/ACT nasal spray     Fluticasone-Umeclidin-Vilanterol (TRELEGY ELLIPTA) 100-62.5-25 MCG/INH oral inhaler     furosemide (LASIX) 20 MG tablet     glipiZIDE (GLUCOTROL XL) 2.5 MG 24 hr tablet     hydrocortisone 2.5 % cream     hydrOXYzine (ATARAX) 50 MG tablet     lamoTRIgine (LAMICTAL) 150 MG tablet     lisinopril (ZESTRIL) 20 MG tablet     metFORMIN (GLUCOPHAGE) 500 MG tablet     montelukast (SINGULAIR) 10 MG tablet     nicotine (NICODERM CQ) 21 MG/24HR 24 hr patch     nicotine (NICORETTE) 2 MG gum     nicotine polacrilex (RA NICOTINE GUM) 4 MG gum     nitroGLYcerin (NITROSTAT) 0.4 MG  sublingual tablet     nystatin (MYCOSTATIN) 806928 UNIT/GM external powder     nystatin (MYCOSTATIN) 365127 UNIT/ML suspension     omeprazole (PRILOSEC) 40 MG DR capsule     ondansetron (ZOFRAN) 8 MG tablet     polyethylene glycol (MIRALAX) 17 GM/Dose powder     prochlorperazine (COMPAZINE) 10 MG tablet     sulfamethoxazole-trimethoprim (BACTRIM DS) 800-160 MG tablet     terbinafine (LAMISIL) 1 % external cream     VENTOLIN  (90 Base) MCG/ACT inhaler     No current facility-administered medications for this visit.       Allergies   Allergen Reactions     Codeine Nausea and Vomiting     vomiting     Cyclobenzaprine Nausea     Gabapentin Rash     Hydrocodone Nausea and Vomiting     Sulfa Drugs Nausea and Vomiting     Nausea       Social History     Socioeconomic History     Marital status:      Spouse name: Not on file     Number of children: Not on file     Years of education: Not on file     Highest education level: Not on file   Occupational History     Not on file   Tobacco Use     Smoking status: Current Every Day Smoker     Packs/day: 0.50     Years: 30.00     Pack years: 15.00     Types: Cigarettes     Start date: 1/1/1985     Smokeless tobacco: Never Used     Tobacco comment: Is trying to quit, using nicotine gum and patch   Substance and Sexual Activity     Alcohol use: No     Alcohol/week: 0.0 standard drinks     Drug use: No     Sexual activity: Not Currently     Partners: Male     Birth control/protection: None   Other Topics Concern      Service Not Asked     Blood Transfusions Not Asked     Caffeine Concern No     Comment: 4-5 cans of pop daily     Occupational Exposure Not Asked     Hobby Hazards Not Asked     Sleep Concern Not Asked     Stress Concern Not Asked     Weight Concern Not Asked     Special Diet No     Back Care Not Asked     Exercise No     Comment: on hold     Bike Helmet Not Asked     Seat Belt Not Asked     Self-Exams Not Asked     Parent/sibling w/ CABG, MI or  angioplasty before 65F 55M? Not Asked   Social History Narrative     Not on file     Social Determinants of Health     Financial Resource Strain:      Difficulty of Paying Living Expenses:    Food Insecurity:      Worried About Running Out of Food in the Last Year:      Ran Out of Food in the Last Year:    Transportation Needs:      Lack of Transportation (Medical):      Lack of Transportation (Non-Medical):    Physical Activity:      Days of Exercise per Week:      Minutes of Exercise per Session:    Stress:      Feeling of Stress :    Social Connections:      Frequency of Communication with Friends and Family:      Frequency of Social Gatherings with Friends and Family:      Attends Mandaeism Services:      Active Member of Clubs or Organizations:      Attends Club or Organization Meetings:      Marital Status:    Intimate Partner Violence:      Fear of Current or Ex-Partner:      Emotionally Abused:      Physically Abused:      Sexually Abused:        Family History   Problem Relation Age of Onset     Heart Disease Mother         murmur, mi     Hypertension Mother      Cerebrovascular Disease Mother      Depression Mother      Gallbladder Disease Mother      Asthma Mother      Family History Negative Father         does not know  him     Family History Negative Brother      Heart Disease Maternal Grandfather      Asthma Maternal Grandfather      Hypertension Maternal Grandfather      Cerebrovascular Disease Maternal Grandfather      Diabetes Maternal Grandfather      Asthma Sister      Hypertension Sister      Cerebrovascular Disease Sister      Hypertension Maternal Grandmother      Cerebrovascular Disease Maternal Grandmother        ROS: 10 point ROS neg other than the symptoms noted above in the HPI.    Vital Signs: /75   Pulse 89   Wt 110.2 kg (243 lb)   LMP 04/15/2016 (Exact Date)   SpO2 98%   BMI 43.05 kg/m      Examination:  Constitutional:  Alert, well nourished, NAD.  HEENT: Normocephalic,  atraumatic.   Pulmonary:  Without shortness of breath, normal effort.   Lymph: No lymphadenopathy to low back or LE.   Integumentary: Skin is free of rashes or lesions.   Cardiovascular:  No pitting edema of BLE.      Neurological:  Awake  Alert  Oriented x 3  Speech clear  Cranial nerves II - XII grossly intact  PERRL  EOMI  Face symmetric  Tongue midline  Motor exam   Shoulder Abduction:  Right:  4-/5   Left:  5/5  Biceps:                      Right:  4/5   Left:  5/5  Triceps:                     Right:  4/5   Left:  5/5  Wrist Extensors:        Right:  5/5   Left:  5/5  Wrist Flexors:            Right:  5/5   Left:  5/5  Intrinsics:                   Right:  4-/5   Left:  5/5    Sensation normal to bilateral upper and lower extremities.    Reflexes are 2+ in the brachial radialis and triceps. Negative Louise sign bilaterally.    Musculoskeletal:  Gait: Able to stand from a seated position. Normal non-antalgic, non-myelopathic gait. Able to heel/toe walk without loss of balance.    Cervical examination reveals good range of motion.  No tenderness to palpation of the cervical spine or paraspinous muscles bilaterally.    Imaging:   No recent cervical spine MRI has been obtained    Assessment/Plan:   Prior C5-6 ACDF   Neck pain   RUE pain and weakness    Will refer to Neurology for further eval

## 2021-10-18 NOTE — PATIENT INSTRUCTIONS
Patient Next Steps:      A referral has been placed for you to see neurology to evaluate your right arm weakness. I will send the order to [facility] in [place]. They should be calling you to schedule.     Please call us if you have any further questions or concerns.    NNEKA Smith  [facility]   Phone: [phone]  Fax: [phone]

## 2021-10-18 NOTE — TELEPHONE ENCOUNTER
Patient calls to report Cough with yellow discharge since waking up today and tightness in her chest.  Patient states she knows that it is a URI.  Patient is requesting Z-Saurabh and/or low dose steroids to get rid of this before her surgery on 1/03/21.  Please advise.    No known exposure to COVID-19.  Patient has had three COVID-19 vaccinations and her flu shot. No fever or loss of taste of smell.

## 2021-10-18 NOTE — PROGRESS NOTES
Assessment:   Swetha Patterson is a 51 year old female seen in consultation for a lipoma on her left shoulder. The lipoma is ~ 4 cm in diameter and is becoming increasingly bothersome.     Plan:  We have discussed the options of observation and excision.  She elects excision.  We discussed the indications, alternatives, and risks.  We discussed scarring, numbness, anesthesia, infection, bleeding, and recurrence.    We will schedule surgery at the patient's convenience.      HPI:  Swetha Patterson is a 51 year old female presents today for a mass on her left shoulder. The mass has been there for several months and is slowly growing. It is frequently traumatized by straps of her clothing and is mildly painful. She has a feeling of tightness in her underlying muscles, which she believes could be triggering more frequent headaches.     Duration: Several months  H/o trauma:  No  Drainage:  No  Previous infections:  No  Other such masses now or in the past:  No  Family h/o similar masses:  No  Pain:  Yes  Size:  slowly increasing    Past Medical History:   has a past medical history of Anxiety state, unspecified, Asthma, Chronic pain, Contact dermatitis and other eczema, due to unspecified cause, COPD (chronic obstructive pulmonary disease) (H), Depressive disorder, not elsewhere classified, Diabetes (H), Emphysema with chronic bronchitis (H), Esophageal reflux, Family history of ischemic heart disease, Fibromyalgia, Gastro-oesophageal reflux disease, Heart disease, History of emphysema, Hoarseness, HTN, goal below 140/90, Hyperlipidemia LDL goal <130, Liver disease, 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), PONV (postoperative nausea and vomiting), and Rectal bleeding.    Past Surgical History:  Past Surgical History:   Procedure Laterality Date     ANESTHESIA OUT OF OR MRI N/A 10/7/2021    Procedure: ANESTHESIA OUT OF OR MRI;  Surgeon: GENERIC ANESTHESIA PROVIDER;  Location: [facility]     ARTHROSCOPY SHOULDER  DECOMPRESSION Left 10/21/2015    Procedure: ARTHROSCOPY SHOULDER DECOMPRESSION;  Surgeon: Julien Milian MD;  Location: [facility]     BIOPSY ARTERY TEMPORAL Left 3/11/2020    Procedure: LEFT TEMPORAL ARTERY BIOPSY;  Surgeon: Ibeth Conner MD;  [facility]: [facility]     BRONCHIAL THERMOPLASTY N/A 11/14/2014    Procedure: BRONCHIAL THERMOPLASTY;  Surgeon: Ward Whitaker MD;  [facility]     BRONCHIAL THERMOPLASTY N/A 12/19/2014    Procedure: BRONCHIAL THERMOPLASTY;  Surgeon: Ward Whitaker MD;  [facility]     BRONCHIAL THERMOPLASTY N/A 2/6/2015    Procedure: BRONCHIAL THERMOPLASTY;  Surgeon: Ward Whitaker MD;  [facility]     C APPENDECTOMY  at age 17     COLONOSCOPY N/A 11/26/2018    Procedure: COLONOSCOPY ([facility]);  Surgeon: Marshall Oakes MD;  [facility]: [facility]     COLONOSCOPY N/A 10/22/2020    Procedure: Colonoscopy;  Surgeon: Marshall Mccormick MD;  Location: [facility]     DISCECTOMY, FUSION CERVICAL ANTERIOR ONE LEVEL, COMBINED N/A 5/1/2018    Procedure: COMBINED DISCECTOMY, FUSION CERVICAL ANTERIOR ONE LEVEL;  1.  C5-C6 anterior cervical diskectomy and fusion.    2.  C5-C6 application of intervertebral biomechanical device for interbody fusion purposes.    3.  C5-C6 anterior instrumentation using the standard Kristin InViZia 24 mm plate with four associated bone screws, 12 mm in length.  Inferior screws are fixed.  Superior screws are va     ENT SURGERY  2015    polyps removed from vocal cords      ESOPHAGOSCOPY, GASTROSCOPY, DUODENOSCOPY (EGD), COMBINED N/A 10/22/2020    Procedure: Esophagoscopy, gastroscopy, duodenoscopy with biopsies;  Surgeon: Marshall Mccormick MD;  Location:  OR     ESOPHAGOSCOPY, GASTROSCOPY, DUODENOSCOPY (EGD), COMBINED N/A 6/17/2021    Procedure: ESOPHAGOGASTRODUODENOSCOPY, WITH BIOPSY;  Surgeon: Darrel Damon MD;  Location:  GI     EXCISE NODE MEDIASTINAL  4/26/2013    Procedure: EXCISE NODE MEDIASTINAL;;  Surgeon: Av Peña MD;   [facility]: [facility]     LAPAROSCOPIC CHOLECYSTECTOMY N/A 10/19/2017    Procedure: LAPAROSCOPIC CHOLECYSTECTOMY;  LAPAROSCOPIC CHOLECYSTECTOMY;  Surgeon: Ney Jerry MD;  Location: [facility] OR     THORACOSCOPY  2013    Procedure: THORACOSCOPY;  LEFT VIDEO ASSISTED THORACOSCOPY, RESECTION OF POSTERIOR MEDIASTINAL MASS;  Surgeon: Av Peña MD;  Location: [facility]     TONSILLECTOMY  as a kid     TONSILLECTOMY          Social History:  Social History     Socioeconomic History     Marital status:      Spouse name: Not on file     Number of children: Not on file     Years of education: Not on file     Highest education level: Not on file   Occupational History     Not on file   Tobacco Use     Smoking status: Former Smoker     Packs/day: 0.50     Years: 30.00     Pack years: 15.00     Types: Cigarettes     Start date: 1985     Quit date: 2021     Years since quittin.1     Smokeless tobacco: Never Used     Tobacco comment: Is trying to quit, using nicotine gum and patch   Vaping Use     Vaping Use: Never used   Substance and Sexual Activity     Alcohol use: No     Alcohol/week: 0.0 standard drinks     Drug use: No     Sexual activity: Not Currently     Partners: Male     Birth control/protection: None   Other Topics Concern      Service Not Asked     Blood Transfusions Not Asked     Caffeine Concern No     Comment: 4-5 cans of pop daily     Occupational Exposure Not Asked     Hobby Hazards Not Asked     Sleep Concern Not Asked     Stress Concern Not Asked     Weight Concern Not Asked     Special Diet No     Back Care Not Asked     Exercise No     Comment: on hold     Bike Helmet Not Asked     Seat Belt Not Asked     Self-Exams Not Asked     Parent/sibling w/ CABG, MI or angioplasty before 65F 55M? Not Asked   Social History Narrative     Not on file     Social Determinants of Health     Financial Resource Strain:      Difficulty of Paying Living Expenses:    Food Insecurity:       "Worried About Running Out of Food in the Last Year:      Ran Out of Food in the Last Year:    Transportation Needs:      Lack of Transportation (Medical):      Lack of Transportation (Non-Medical):    Physical Activity:      Days of Exercise per Week:      Minutes of Exercise per Session:    Stress:      Feeling of Stress :    Social Connections:      Frequency of Communication with Friends and Family:      Frequency of Social Gatherings with Friends and Family:      Attends Restoration Services:      Active Member of Clubs or Organizations:      Attends Club or Organization Meetings:      Marital Status:    Intimate Partner Violence:      Fear of Current or Ex-Partner:      Emotionally Abused:      Physically Abused:      Sexually Abused:         Family History:  Family History   Problem Relation Age of Onset     Heart Disease Mother         murmur, mi     Hypertension Mother      Cerebrovascular Disease Mother      Depression Mother      Gallbladder Disease Mother      Asthma Mother      Family History Negative Father         does not know  him     Family History Negative Brother      Heart Disease Maternal Grandfather      Asthma Maternal Grandfather      Hypertension Maternal Grandfather      Cerebrovascular Disease Maternal Grandfather      Diabetes Maternal Grandfather      Asthma Sister      Hypertension Sister      Cerebrovascular Disease Sister      Hypertension Maternal Grandmother      Cerebrovascular Disease Maternal Grandmother      Family history reviewed and not pertinent.    ROS:  The 10 point review of systems is negative other than noted in the HPI and above.    PE:  /84   Pulse 81   Resp 16   Ht 1.6 m (5' 3\")   Wt 110.2 kg (243 lb)   LMP 04/15/2016 (Exact Date)   SpO2 99%   BMI 43.05 kg/m    General appearance: well-developed, well-nourished, no apparent distress  HEENT:  Head normocephalic and atraumatic, pupils equal and round, conjunctivae clear, mucous membranes moist, external ears " and nose normal  Lungs: respirations unlabored  Musculoskeletal:  Normal station and gait  Extremities: warm, without edema  Neurologic: alert, speech is clear, moves all extremities with good strength  Psychiatric: Mood and affect are appropriate  Skin: There is a 4 cm subcutaneous mass on/in the left shoulder.  The overlying skin is normal.  It is mildly tender. It is soft and mobile    25 minutes total time spent on the date of this encounter doing: chart review, review of test results, patient visit, physical exam, education, counseling, developing plan of care, and documenting.    Ibeth Conner MD    Please route or send letter to:  Referring Provider

## 2021-10-18 NOTE — LETTER
"    10/18/2021         RE: Swetha Patterson  [address]  [facility] [address]        Dear Colleague,    Thank you for referring your patient, Swetha Patterson, to the [facility] [place]. Please see a copy of my visit note below.    Swetha Patterson is a 51 year old female with history of C5-6 ACDF with Dr. Eric in 2018. She did well initially post op, but then developed neck pain and right arm pain about 6 months ago. Then 1 month ago she developed RUE weakness and difficulty with  strength. Denies any trauma or injuries. Describes the pain as aching. Pain is worsened with heavy lifting and activity. She has been doing home stretches. She recently saw [facility] last week, they obtained XR and recommended PT. She also had EMG two weeks ago at [facility] in [place]. Denies any falls or bladder/bowel incontinence.     Returns for follow up.  Continued neck pain, right arm pain with hand weakness, lancinating pain down the back, and right lateral thigh pain.  EMG was read as mild carpal tunnel.  New MRI did not shows high grade stenosis, mild right C5-6 foraminal stenosis.    Past Medical History:   Diagnosis Date     Anxiety state, unspecified      Asthma      Chronic pain     back pain from cyst     Contact dermatitis and other eczema, due to unspecified cause      COPD (chronic obstructive pulmonary disease) (H)      Depressive disorder, not elsewhere classified      Diabetes (H)      Emphysema with chronic bronchitis (H)      Esophageal reflux      Family history of ischemic heart disease      Fibromyalgia      Gastro-oesophageal reflux disease      Heart disease     has chest pain sometimes     History of emphysema      Hoarseness      HTN, goal below 140/90      Hyperlipidemia LDL goal <130      Liver disease     \"fatty liver\"     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)      PONV (postoperative nausea and vomiting)      Rectal bleeding        Past Medical History reviewed with patient during " visit.    Past Surgical History:   Procedure Laterality Date     ANESTHESIA OUT OF OR MRI N/A 10/7/2021    Procedure: ANESTHESIA OUT OF OR MRI;  Surgeon: GENERIC ANESTHESIA PROVIDER;  Location: [facility] OR     ARTHROSCOPY SHOULDER DECOMPRESSION Left 10/21/2015    Procedure: ARTHROSCOPY SHOULDER DECOMPRESSION;  Surgeon: Julien Milian MD;  Location: [facility] OR     BIOPSY ARTERY TEMPORAL Left 3/11/2020    Procedure: LEFT TEMPORAL ARTERY BIOPSY;  Surgeon: Ibeth Conner MD;  Location: [facility]     BRONCHIAL THERMOPLASTY N/A 11/14/2014    Procedure: BRONCHIAL THERMOPLASTY;  Surgeon: Ward Whitaker MD;  Location: [facility]     BRONCHIAL THERMOPLASTY N/A 12/19/2014    Procedure: BRONCHIAL THERMOPLASTY;  Surgeon: Ward Whitaker MD;  [facility]: [facility]     BRONCHIAL THERMOPLASTY N/A 2/6/2015    Procedure: BRONCHIAL THERMOPLASTY;  Surgeon: Ward Whitaker MD;  Location: [facility]     C APPENDECTOMY  at age 17     COLONOSCOPY N/A 11/26/2018    Procedure: COLONOSCOPY ([facility]);  Surgeon: Marshall Oakes MD;  Location: [facility]     COLONOSCOPY N/A 10/22/2020    Procedure: Colonoscopy;  Surgeon: Marshall Mccormick MD;  Location: [facility] OR     DISCECTOMY, FUSION CERVICAL ANTERIOR ONE LEVEL, COMBINED N/A 5/1/2018    Procedure: COMBINED DISCECTOMY, FUSION CERVICAL ANTERIOR ONE LEVEL;  1.  C5-C6 anterior cervical diskectomy and fusion.    2.  C5-C6 application of intervertebral biomechanical device for interbody fusion purposes.    3.  C5-C6 anterior instrumentation using the standard Kristin InViZia 24 mm plate with four associated bone screws, 12 mm in length.  Inferior screws are fixed.  Superior screws are va     ENT SURGERY  2015    polyps removed from vocal cords      ESOPHAGOSCOPY, GASTROSCOPY, DUODENOSCOPY (EGD), COMBINED N/A 10/22/2020    Procedure: Esophagoscopy, gastroscopy, duodenoscopy with biopsies;  Surgeon: Marshall Mccormick MD;  Location:  OR     ESOPHAGOSCOPY, GASTROSCOPY, DUODENOSCOPY (EGD), COMBINED N/A  6/17/2021    Procedure: ESOPHAGOGASTRODUODENOSCOPY, WITH BIOPSY;  Surgeon: Darrel Damon MD;  Location:  GI     EXCISE NODE MEDIASTINAL  4/26/2013    Procedure: EXCISE NODE MEDIASTINAL;;  Surgeon: Av Peña MD;  Location:  OR     LAPAROSCOPIC CHOLECYSTECTOMY N/A 10/19/2017    Procedure: LAPAROSCOPIC CHOLECYSTECTOMY;  LAPAROSCOPIC CHOLECYSTECTOMY;  Surgeon: Ney Jerry MD;  Location:  OR     THORACOSCOPY  4/26/2013    Procedure: THORACOSCOPY;  LEFT VIDEO ASSISTED THORACOSCOPY, RESECTION OF POSTERIOR MEDIASTINAL MASS;  Surgeon: Av Peña MD;  Location:  OR     TONSILLECTOMY  as a kid     TONSILLECTOMY       Past Surgical History reviewed with patient during visit.    Current Outpatient Medications   Medication     albuterol (PROVENTIL) (2.5 MG/3ML) 0.083% neb solution     atorvastatin (LIPITOR) 80 MG tablet     benzoyl peroxide (ACNE-CLEAR) 10 % external gel     blood glucose (ACCU-CHEK ALLYSON PLUS) test strip     blood glucose (NO BRAND SPECIFIED) lancets standard     blood glucose (NO BRAND SPECIFIED) lancets standard     blood glucose (NO BRAND SPECIFIED) test strip     blood glucose monitoring (NO BRAND SPECIFIED) meter device kit     blood glucose monitoring (NO BRAND SPECIFIED) meter device kit     buPROPion (WELLBUTRIN XL) 150 MG 24 hr tablet     cetirizine HCl 10 MG CAPS     clindamycin 1 % EX external gel     clonazePAM (KLONOPIN) 1 MG tablet     cyclobenzaprine (FLEXERIL) 10 MG tablet     cyclobenzaprine (FLEXERIL) 5 MG tablet     dicyclomine (BENTYL) 20 MG tablet     DULoxetine (CYMBALTA) 20 MG capsule     fluticasone (FLONASE) 50 MCG/ACT nasal spray     Fluticasone-Umeclidin-Vilanterol (TRELEGY ELLIPTA) 100-62.5-25 MCG/INH oral inhaler     furosemide (LASIX) 20 MG tablet     glipiZIDE (GLUCOTROL XL) 2.5 MG 24 hr tablet     hydrocortisone 2.5 % cream     hydrOXYzine (ATARAX) 50 MG tablet     lamoTRIgine (LAMICTAL) 150 MG tablet     lisinopril (ZESTRIL) 20 MG  tablet     metFORMIN (GLUCOPHAGE) 500 MG tablet     montelukast (SINGULAIR) 10 MG tablet     nicotine (NICODERM CQ) 21 MG/24HR 24 hr patch     nicotine (NICORETTE) 2 MG gum     nicotine polacrilex (RA NICOTINE GUM) 4 MG gum     nitroGLYcerin (NITROSTAT) 0.4 MG sublingual tablet     nystatin (MYCOSTATIN) 842763 UNIT/GM external powder     nystatin (MYCOSTATIN) 302730 UNIT/ML suspension     omeprazole (PRILOSEC) 40 MG DR capsule     ondansetron (ZOFRAN) 8 MG tablet     polyethylene glycol (MIRALAX) 17 GM/Dose powder     prochlorperazine (COMPAZINE) 10 MG tablet     sulfamethoxazole-trimethoprim (BACTRIM DS) 800-160 MG tablet     terbinafine (LAMISIL) 1 % external cream     VENTOLIN  (90 Base) MCG/ACT inhaler     No current facility-administered medications for this visit.       Allergies   Allergen Reactions     Codeine Nausea and Vomiting     vomiting     Cyclobenzaprine Nausea     Gabapentin Rash     Hydrocodone Nausea and Vomiting     Sulfa Drugs Nausea and Vomiting     Nausea       Social History     Socioeconomic History     Marital status:      Spouse name: Not on file     Number of children: Not on file     Years of education: Not on file     Highest education level: Not on file   Occupational History     Not on file   Tobacco Use     Smoking status: Current Every Day Smoker     Packs/day: 0.50     Years: 30.00     Pack years: 15.00     Types: Cigarettes     Start date: 1/1/1985     Smokeless tobacco: Never Used     Tobacco comment: Is trying to quit, using nicotine gum and patch   Substance and Sexual Activity     Alcohol use: No     Alcohol/week: 0.0 standard drinks     Drug use: No     Sexual activity: Not Currently     Partners: Male     Birth control/protection: None   Other Topics Concern      Service Not Asked     Blood Transfusions Not Asked     Caffeine Concern No     Comment: 4-5 cans of pop daily     Occupational Exposure Not Asked     Hobby Hazards Not Asked     Sleep Concern  Not Asked     Stress Concern Not Asked     Weight Concern Not Asked     Special Diet No     Back Care Not Asked     Exercise No     Comment: on hold     Bike Helmet Not Asked     Seat Belt Not Asked     Self-Exams Not Asked     Parent/sibling w/ CABG, MI or angioplasty before 65F 55M? Not Asked   Social History Narrative     Not on file     Social Determinants of Health     Financial Resource Strain:      Difficulty of Paying Living Expenses:    Food Insecurity:      Worried About Running Out of Food in the Last Year:      Ran Out of Food in the Last Year:    Transportation Needs:      Lack of Transportation (Medical):      Lack of Transportation (Non-Medical):    Physical Activity:      Days of Exercise per Week:      Minutes of Exercise per Session:    Stress:      Feeling of Stress :    Social Connections:      Frequency of Communication with Friends and Family:      Frequency of Social Gatherings with Friends and Family:      Attends [employer] Services:      Active Member of Clubs or Organizations:      Attends Club or Organization Meetings:      Marital Status:    Intimate Partner Violence:      Fear of Current or Ex-Partner:      Emotionally Abused:      Physically Abused:      Sexually Abused:        Family History   Problem Relation Age of Onset     Heart Disease Mother         murmur, mi     Hypertension Mother      Cerebrovascular Disease Mother      Depression Mother      Gallbladder Disease Mother      Asthma Mother      Family History Negative Father         does not know  him     Family History Negative Brother      Heart Disease Maternal Grandfather      Asthma Maternal Grandfather      Hypertension Maternal Grandfather      Cerebrovascular Disease Maternal Grandfather      Diabetes Maternal Grandfather      Asthma Sister      Hypertension Sister      Cerebrovascular Disease Sister      Hypertension Maternal Grandmother      Cerebrovascular Disease Maternal Grandmother        ROS: 10 point ROS neg  other than the symptoms noted above in the HPI.    Vital Signs: /75   Pulse 89   Wt 110.2 kg (243 lb)   LMP 04/15/2016 (Exact Date)   SpO2 98%   BMI 43.05 kg/m      Examination:  Constitutional:  Alert, well nourished, NAD.  HEENT: Normocephalic, atraumatic.   Pulmonary:  Without shortness of breath, normal effort.   Lymph: No lymphadenopathy to low back or LE.   Integumentary: Skin is free of rashes or lesions.   Cardiovascular:  No pitting edema of BLE.      Neurological:  Awake  Alert  Oriented x 3  Speech clear  Cranial nerves II - XII grossly intact  PERRL  EOMI  Face symmetric  Tongue midline  Motor exam   Shoulder Abduction:  Right:  4-/5   Left:  5/5  Biceps:                      Right:  4/5   Left:  5/5  Triceps:                     Right:  4/5   Left:  5/5  Wrist Extensors:        Right:  5/5   Left:  5/5  Wrist Flexors:            Right:  5/5   Left:  5/5  Intrinsics:                   Right:  4-/5   Left:  5/5    Sensation normal to bilateral upper and lower extremities.    Reflexes are 2+ in the brachial radialis and triceps. Negative Louise sign bilaterally.    Musculoskeletal:  Gait: Able to stand from a seated position. Normal non-antalgic, non-myelopathic gait. Able to heel/toe walk without loss of balance.    Cervical examination reveals good range of motion.  No tenderness to palpation of the cervical spine or paraspinous muscles bilaterally.    Imaging:   No recent cervical spine MRI has been obtained    Assessment/Plan:   Prior C5-6 ACDF   Neck pain   RUE pain and weakness    Will refer to Neurology for further eval      Again, thank you for allowing me to participate in the care of your patient.        Sincerely,        Rob Adams MD

## 2021-10-18 NOTE — TELEPHONE ENCOUNTER
Pt calling     Advised on the information below     Patient stated an understanding and agreed with plan.    Lis Brina RN, BSN  [facility]

## 2021-10-19 ENCOUNTER — E-VISIT (OUTPATIENT)
Dept: INTERNAL MEDICINE | Facility: CLINIC | Age: 51
End: 2021-10-19
Payer: COMMERCIAL

## 2021-10-19 DIAGNOSIS — J44.1 COPD EXACERBATION (H): Primary | ICD-10-CM

## 2021-10-19 PROCEDURE — 99421 OL DIG E/M SVC 5-10 MIN: CPT | Performed by: INTERNAL MEDICINE

## 2021-10-19 RX ORDER — PREDNISONE 10 MG/1
TABLET ORAL
Qty: 15 TABLET | Refills: 0 | Status: SHIPPED | OUTPATIENT
Start: 2021-10-19 | End: 2021-10-25

## 2021-10-19 RX ORDER — GUAIFENESIN 600 MG/1
1200 TABLET, EXTENDED RELEASE ORAL 2 TIMES DAILY
Qty: 30 TABLET | Refills: 0 | Status: SHIPPED | OUTPATIENT
Start: 2021-10-19 | End: 2021-10-28

## 2021-10-19 RX ORDER — AZITHROMYCIN 250 MG/1
TABLET, FILM COATED ORAL
Qty: 6 TABLET | Refills: 1 | Status: SHIPPED | OUTPATIENT
Start: 2021-10-19 | End: 2021-10-25

## 2021-10-20 ENCOUNTER — VIRTUAL VISIT (OUTPATIENT)
Dept: GASTROENTEROLOGY | Facility: CLINIC | Age: 51
End: 2021-10-20
Payer: COMMERCIAL

## 2021-10-20 DIAGNOSIS — Z53.9 ERRONEOUS ENCOUNTER--DISREGARD: Primary | ICD-10-CM

## 2021-10-20 NOTE — PROGRESS NOTES
Swetha is a 51 year old who is being evaluated via a billable video visit.      No answer for visit

## 2021-10-22 ENCOUNTER — OFFICE VISIT (OUTPATIENT)
Dept: SURGERY | Facility: CLINIC | Age: 51
End: 2021-10-22
Attending: INTERNAL MEDICINE
Payer: COMMERCIAL

## 2021-10-22 VITALS
SYSTOLIC BLOOD PRESSURE: 117 MMHG | BODY MASS INDEX: 41.6 KG/M2 | HEART RATE: 83 BPM | OXYGEN SATURATION: 99 % | WEIGHT: 243.7 LBS | DIASTOLIC BLOOD PRESSURE: 72 MMHG | HEIGHT: 64 IN

## 2021-10-22 DIAGNOSIS — E78.5 HYPERLIPIDEMIA LDL GOAL <130: Chronic | ICD-10-CM

## 2021-10-22 DIAGNOSIS — E66.01 MORBID OBESITY WITH BMI OF 40.0-44.9, ADULT (H): Primary | ICD-10-CM

## 2021-10-22 DIAGNOSIS — K21.9 GASTROESOPHAGEAL REFLUX DISEASE WITHOUT ESOPHAGITIS: Chronic | ICD-10-CM

## 2021-10-22 DIAGNOSIS — K75.81 NASH (NONALCOHOLIC STEATOHEPATITIS): Chronic | ICD-10-CM

## 2021-10-22 PROCEDURE — 99214 OFFICE O/P EST MOD 30 MIN: CPT | Performed by: PHYSICIAN ASSISTANT

## 2021-10-22 ASSESSMENT — MIFFLIN-ST. JEOR: SCORE: 1692.48

## 2021-10-22 NOTE — PROGRESS NOTES
"New Bariatric Surgery Consultation Note      2021    RE: Swetha Patterson  MR#: [id number]  : 1970      Referring provider:       10/21/2021   Who referred you? Gastrology clinic in [facility] and my primary care doctor girma       Chief Complaint/Reason for visit: evaluation for possible weight loss surgery    Dear Cammy, Roro Mehta MD (General),    I had the pleasure of seeing your patient, Swetha Pattesron, to evaluate her obesity and consider her for possible weight loss surgery. As you know, Swetha Patterson is 51 year old.  She has a height of 5' 3.5\", a weight of 243 lbs 11.2 oz, and calculated Body mass index is 42.49 kg/m .    Has seen multiple providers including GI and Dr Campos. Notes state patient has a small hiatal hernia.    Was given bentyl July but patient has not taken    Nurse comes out once a week to her house. Checks vitals and places her medications out.  Patient has a deborah waiver and qualifies for the nurse to come out.     Patient has been tobacco free free for 5 days.  Has smoked for 40 years lately had been about 7 cigarettes per day. Is using nicoderm patch    Have taken prednisone burst due to COPD      After discussing medical history determined patient is not currently a candidate for surgery. She has had a relapse with alcohol with a history of several inpatient treatments. Overdosed and spent several days in ICU last year. Current therapist is one she does not get along with and needs a new one.  She is very teary in clinic today and states she is depressed.  Currently binging daily and admits is due to emotional eating.    Strongly encouraged patient to get set up with a new therapist to help manage her depression.  Offered medical weight management at a a minimum dietary education.  Patient was upset and crying and declined RD visit and medical weight management.       HISTORY OF PRESENT ILLNESS:  Weight Loss History Reviewed with Patient " "10/21/2021   How long have you been overweight? Since late 20's to early 40's   What is the most that you have ever weighed? 250   What is the most weight you have lost? 60   I have tried the following methods to lose weight Exercise   I have tried the following weight loss medications? (Check all that apply) None   Have you ever had weight loss surgery? No       CO-MORBIDITIES OF OBESITY INCLUDE:     10/21/2021   I have the following health issues associated with obesity: Type II Diabetes, High Blood Pressure, High Cholesterol, GERD (Reflux), Fatty Liver, Asthma, Stress Incontinence       PAST MEDICAL HISTORY:  Past Medical History:   Diagnosis Date     Anxiety state, unspecified      Asthma      Chronic pain     back pain from cyst     Contact dermatitis and other eczema, due to unspecified cause      COPD (chronic obstructive pulmonary disease) (H)      Depressive disorder, not elsewhere classified      Diabetes (H)      Emphysema with chronic bronchitis (H)      Esophageal reflux      Family history of ischemic heart disease      Fibromyalgia      Gastro-oesophageal reflux disease      Heart disease     has chest pain sometimes     History of emphysema      Hoarseness      HTN, goal below 140/90      Hyperlipidemia LDL goal <130      Liver disease     \"fatty liver\"     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)      PONV (postoperative nausea and vomiting)      Rectal bleeding        PAST SURGICAL HISTORY:  Past Surgical History:   Procedure Laterality Date     ANESTHESIA OUT OF OR MRI N/A 10/7/2021    Procedure: ANESTHESIA OUT OF OR MRI;  Surgeon: GENERIC ANESTHESIA PROVIDER;  Location: [facility] OR     ARTHROSCOPY SHOULDER DECOMPRESSION Left 10/21/2015    Procedure: ARTHROSCOPY SHOULDER DECOMPRESSION;  Surgeon: Julien Milian MD;  Location: [facility] OR     BIOPSY ARTERY TEMPORAL Left 3/11/2020    Procedure: LEFT TEMPORAL ARTERY BIOPSY;  Surgeon: Ibeth Conner MD;  Location: [facility] OR     BRONCHIAL THERMOPLASTY N/A " 11/14/2014    Procedure: BRONCHIAL THERMOPLASTY;  Surgeon: Ward Whitaker MD;  Location: [facility]     BRONCHIAL THERMOPLASTY N/A 12/19/2014    Procedure: BRONCHIAL THERMOPLASTY;  Surgeon: Ward Whitaker MD;  Location: [facility] OR     BRONCHIAL THERMOPLASTY N/A 2/6/2015    Procedure: BRONCHIAL THERMOPLASTY;  Surgeon: Ward Whitaker MD;  Location: [facility] OR     C APPENDECTOMY  at age 17     COLONOSCOPY N/A 11/26/2018    Procedure: COLONOSCOPY ([facility]);  Surgeon: Marshall Oakes MD;  Location:  OR     COLONOSCOPY N/A 10/22/2020    Procedure: Colonoscopy;  Surgeon: Marshall Mccormick MD;  Location: [facility] OR     DISCECTOMY, FUSION CERVICAL ANTERIOR ONE LEVEL, COMBINED N/A 5/1/2018    Procedure: COMBINED DISCECTOMY, FUSION CERVICAL ANTERIOR ONE LEVEL;  1.  C5-C6 anterior cervical diskectomy and fusion.    2.  C5-C6 application of intervertebral biomechanical device for interbody fusion purposes.    3.  C5-C6 anterior instrumentation using the standard Kristin InViZia 24 mm plate with four associated bone screws, 12 mm in length.  Inferior screws are fixed.  Superior screws are va     ENT SURGERY  2015    polyps removed from vocal cords      ESOPHAGOSCOPY, GASTROSCOPY, DUODENOSCOPY (EGD), COMBINED N/A 10/22/2020    Procedure: Esophagoscopy, gastroscopy, duodenoscopy with biopsies;  Surgeon: Marshall Mccormick MD;  Location:  OR     ESOPHAGOSCOPY, GASTROSCOPY, DUODENOSCOPY (EGD), COMBINED N/A 6/17/2021    Procedure: ESOPHAGOGASTRODUODENOSCOPY, WITH BIOPSY;  Surgeon: Darrel Damon MD;  Location:  GI     EXCISE NODE MEDIASTINAL  4/26/2013    Procedure: EXCISE NODE MEDIASTINAL;;  Surgeon: Av Peña MD;  Location:  OR     LAPAROSCOPIC CHOLECYSTECTOMY N/A 10/19/2017    Procedure: LAPAROSCOPIC CHOLECYSTECTOMY;  LAPAROSCOPIC CHOLECYSTECTOMY;  Surgeon: Ney Jerry MD;  Location:  OR     THORACOSCOPY  4/26/2013    Procedure: THORACOSCOPY;  LEFT VIDEO ASSISTED THORACOSCOPY, RESECTION OF  POSTERIOR MEDIASTINAL MASS;  Surgeon: Av Peña MD;  Location: [facility] OR     TONSILLECTOMY  as a kid     TONSILLECTOMY       No personal or family history of blood clots or anesthesia concerns      FAMILY HISTORY:   Family History   Problem Relation Age of Onset     Heart Disease Mother         murmur, mi     Hypertension Mother      Cerebrovascular Disease Mother      Depression Mother      Gallbladder Disease Mother      Asthma Mother      Family History Negative Father         does not know  him     Family History Negative Brother      Heart Disease Maternal Grandfather      Asthma Maternal Grandfather      Hypertension Maternal Grandfather      Cerebrovascular Disease Maternal Grandfather      Diabetes Maternal Grandfather      Asthma Sister      Hypertension Sister      Cerebrovascular Disease Sister      Hypertension Maternal Grandmother      Cerebrovascular Disease Maternal Grandmother        SOCIAL HISTORY:   Social History Questions Reviewed With Patient 10/21/2021   Which best describes your employment status (select all that apply) I am disabled because of COPD and depression   Which best describes your marital status:    Do you have children? No   Who do you have in your support network that can be available to help you for the first 2 weeks after surgery? My sister Rukhsana and my best friend Urmila and my dad   Who can you count on for support throughout your weight loss surgery journey? My sister Rukhsana Kearns my dad and other friends that I know   Can you afford 3 meals a day?  Yes   Can you afford 50-60 dollars a month for vitamins? No       HABITS:     10/21/2021   How often do you drink alcohol? Monthly or less   If you do drink alcohol, how many drinks might you have in a day? (one drink = 5 oz. wine, 1 can/bottle of beer, 1 shot liquor) 1 or 2   Have you ever used any of the following nicotine products? Cigarettes   If you previously used any of these products, what year did you  quit?    Have you or are you currently using street drugs or prescription strength medication for which you do not have a prescription for? No   Do you have a history of chemical dependency (alcohol or drug abuse)? Yes       PSYCHOLOGICAL HISTORY:   Psychological History Reviewed With Patient 10/21/2021   Have you ever attempted suicide? In the last 1 to 5 years.   Have you had thoughts of suicide in the past year? No   Have you ever been hospitalized for mental illness or a suicide attempt? In the last 1 to 5 years.   Do you have a history of chronic pain? Yes   Have you ever been diagnosed with fibromyalgia? Yes   Are you currently being treated for any of the following? (select all that apply) Depression, Anxiety   Are you currently seeing a therapist or counselor?  Yes   Are you currently seeing a psychiatrist? Yes      Overdosed a year ago  after her nephew .  Went inpatient for suicide attempt. She was in ICU    Has a therapist and psychiatrist. Had a therapist that got promoted so has a new therapist for the past 4 months. She does not like the new therapist so will be getting a new one soon.  Has had same psychiatrist for over a year    Was in ED for alcohol intoxication 2021. In  did inpatient/outpatient aftercare for alcohol intoxication.  Says the last time she drank was past May.  Has had one other hospitalization for alcohol years ago.      ROS:     10/21/2021   Skin:  Skin fold rashes (groin or other folds)   HEENT: Missing teeth, Teeth, dentures, or bridges needing repairs   If you answered yes to missing teeth, please indicate how many: 14   Musculoskeletal: Joint Pain, Back pain, Swelling of legs, Arthritis   Cardiovascular: Shortness of breath with activity   Pulmonary: Shortness of breath with activity, Snoring, Excessively sleep during the day   Gastrointestinal: Heartburn, Reflux, Diarrhea, Difficulty swallowing (food gets stuck)   Genitourinary: Stress incontinence (losing  urine when coughing, sneezing, etc.)   Hematological: None of the above   Neurological: None of the above   Female only: Loss of menstrual cycles     No family or personal history of thyroid cancer  Positive GERD  No severe constipation  No small bowel obstruction  Positive for pancreatitis - denies cause of alcohol      EATING BEHAVIORS:     10/21/2021   Have you or anyone else thought that you had an eating disorder? Yes   If you answered yes to the previous eating disorder question, select the types that apply from this list: Binge Eating   Do you currently binge eat (eat a large amount of food in a short time)? Yes   Are you an emotional eater? No   Do you get up to eat after falling asleep? Yes     Eats late at night and until uncomfortably full daily.  Emotionally eats trying to get away from her medical problems.    EXERCISE:     10/21/2021   How often do you exercise? Daily   What is the duration of your exercise (in minutes)? 45 Minutes   What types of exercise do you do? walking, other   What keeps you from being more active?  Pain, I have recently been sick, I have just had surgery on one or more of my joints, My ability to walk or move around is limited, Shortness of breath, Worried people will look at me       MEDICATIONS:  Current Outpatient Medications   Medication Sig Dispense Refill     albuterol (PROVENTIL) (2.5 MG/3ML) 0.083% neb solution Take 1 vial (2.5 mg) by nebulization every 6 hours as needed for shortness of breath / dyspnea or wheezing 25 vial 3     atorvastatin (LIPITOR) 80 MG tablet TAKE ONE TABLET BY MOUTH EVERY DAY 90 tablet 0     azithromycin (ZITHROMAX) 250 MG tablet Two tablets first day, then one tablet daily for four days. 6 tablet 1     benzoyl peroxide (ACNE-CLEAR) 10 % external gel Apply topically 2 times daily as needed       blood glucose (ACCU-CHEK ALLYSON PLUS) test strip USE TO TEST BLOOD SUGAR ONE TIME DAILY OR AS DIRECTED 100 each 0     blood glucose (NO BRAND SPECIFIED)  lancets standard Use to test blood sugar 1 times daily or as directed. 100 each 3     blood glucose (NO BRAND SPECIFIED) lancets standard Use to test blood sugar 1 time daily 100 each 1     blood glucose (NO BRAND SPECIFIED) test strip Use to test blood sugar 1 times daily or as directed.  Dispense Accu-chek Olinda Plus or per patient insurance 100 strip 3     blood glucose monitoring (NO BRAND SPECIFIED) meter device kit Use to test blood sugar 1 times daily or as directed.  Dispense Accu-chek Olinda Plus or per insurance preference 1 kit 0     blood glucose monitoring (NO BRAND SPECIFIED) meter device kit Use to test blood sugar 1 times daily or as directed. 1 kit 0     buPROPion (WELLBUTRIN XL) 150 MG 24 hr tablet TAKE ONE TABLET BY MOUTH EVERY MORNING 90 tablet 2     cetirizine HCl 10 MG CAPS Take 1 capsule (10 mg) by mouth daily as needed Takes in the spring. 90 capsule 3     clindamycin 1 % EX external gel Apply topically 2 times daily 60 g 3     clonazePAM (KLONOPIN) 1 MG tablet Take 1 tablet (1 mg) by mouth 2 times daily as needed for anxiety She needs to see her PCP to get more tablets 5 tablet 0     cyclobenzaprine (FLEXERIL) 10 MG tablet Take 1 tablet (10 mg) by mouth 2 times daily as needed for muscle spasms 25 tablet 0     cyclobenzaprine (FLEXERIL) 5 MG tablet Take 1 tablet (5 mg) by mouth 3 times daily as needed for muscle spasms 30 tablet 1     dicyclomine (BENTYL) 20 MG tablet Take 1 tablet (20 mg) by mouth 3 times daily as needed (abdominal pain/cramping) 90 tablet 3     DULoxetine (CYMBALTA) 20 MG capsule Take 1 capsule (20 mg) by mouth daily X 7 days and if well tolerated increase to 40 mg daily. 53 capsule 1     fluticasone (FLONASE) 50 MCG/ACT nasal spray Spray 2 sprays in nostril daily 16 g 5     Fluticasone-Umeclidin-Vilanterol (TRELEGY ELLIPTA) 100-62.5-25 MCG/INH oral inhaler Inhale 1 puff into the lungs daily 28 each 3     furosemide (LASIX) 20 MG tablet Take 1 tablet (20 mg) by mouth 2  times daily 180 tablet 0     glipiZIDE (GLUCOTROL XL) 2.5 MG 24 hr tablet TAKE ONE TABLET BY MOUTH EVERY DAY 90 tablet 1     guaiFENesin (MUCINEX) 600 MG 12 hr tablet Take 2 tablets (1,200 mg) by mouth 2 times daily 30 tablet 0     hydrocortisone 2.5 % cream APPLY TOPICALLY TO THE AFFECTED AREA(S) TWO TIMES A DAY 28 g 1     hydrOXYzine (ATARAX) 50 MG tablet TAKE TWO TABLETS BY MOUTH THREE TIMES A DAY AS NEEDED FOR ANXIETY MAX OF 70 TABLETS PER WEEK 70 tablet 5     lamoTRIgine (LAMICTAL) 150 MG tablet Take 150 mg by mouth daily       lisinopril (ZESTRIL) 20 MG tablet TAKE ONE TABLET BY MOUTH EVERY DAY 90 tablet 0     metFORMIN (GLUCOPHAGE) 500 MG tablet TAKE ONE TABLET BY MOUTH EVERY DAY WITH BREAKFAST 90 tablet 1     montelukast (SINGULAIR) 10 MG tablet Take 1 tablet (10 mg) by mouth At Bedtime 90 tablet 4     nicotine (NICODERM CQ) 21 MG/24HR 24 hr patch Place 1 patch onto the skin every 24 hours 30 patch 1     nicotine (NICORETTE) 2 MG gum Place 1 each (2 mg) inside cheek as needed for smoking cessation 150 each 1     nicotine polacrilex (RA NICOTINE GUM) 4 MG gum Place 1 each (4 mg) inside cheek every 2 hours as needed for smoking cessation 30 each 3     nitroGLYcerin (NITROSTAT) 0.4 MG sublingual tablet PLACE ONE TABLET UNDER THE TONGUE AT THE 1ST SIGN OF ATTACK. IF PAIN IS UNRELIEVED OR WORSENED 5 MINUTES AFTER 1ST DOSE, PROMPT MEDICAL ASSISTANCE IS NEEDED. MAY REPEAT EVERY 5 MINUTES UNTIL PAIN IS RELIEVED. MAX OF THREE DOSES 25 tablet 3     nystatin (MYCOSTATIN) 124502 UNIT/GM external powder APPLY TOPICALLY TWICE A DAY AS NEEDED SKIN RASH 45 g 3     nystatin (MYCOSTATIN) 397608 UNIT/ML suspension TAKE 5 ML BY MOUTH FOUR TIMES A  mL 0     omeprazole (PRILOSEC) 40 MG DR capsule TAKE ONE CAPSULE BY MOUTH TWICE A  capsule 0     ondansetron (ZOFRAN) 8 MG tablet TAKE ONE-HALF TO ONE TABLET BY MOUTH EVERY 8 HOURS AS NEEDED FOR NAUSEA 30 tablet 3     polyethylene glycol (MIRALAX) 17 GM/Dose powder Take  "17 g (1 capful) by mouth daily 507 g 1     predniSONE (DELTASONE) 10 MG tablet Day 1-5 take 20 mg daily, day 6-10 take 10 mg daily 15 tablet 0     prochlorperazine (COMPAZINE) 10 MG tablet Take 1 tablet (10 mg) by mouth every 8 hours as needed for nausea or vomiting 30 tablet 0     sulfamethoxazole-trimethoprim (BACTRIM DS) 800-160 MG tablet Take 1 tablet by mouth 2 times daily Take one tablet twice daily. For additional refills, please schedule a follow-up appointment. 180 tablet 1     terbinafine (LAMISIL) 1 % external cream Apply topically 2 times daily 30 g 2     VENTOLIN  (90 Base) MCG/ACT inhaler INHALE TWO PUFFS BY MOUTH EVERY 6 HOURS AS NEEDED FOR SHORTNESS OF BREATH 18 g 1       ALLERGIES:  Allergies   Allergen Reactions     Codeine Nausea and Vomiting     vomiting     Cyclobenzaprine Nausea     Gabapentin Rash     Hydrocodone Nausea and Vomiting     Sulfa Drugs Nausea and Vomiting     Nausea       LABS/IMAGING/MEDICAL RECORDS REVIEW: Epic      PHYSICAL EXAM:  /72   Pulse 83   Ht 5' 3.5\" (1.613 m)   Wt 243 lb 11.2 oz (110.5 kg)   LMP 04/15/2016 (Exact Date)   SpO2 99%   BMI 42.49 kg/m    Alert and oriented.  Emotional for most of the visit        Sincerely,       Corby Genao PA-C       30 minutes spent on the date of the encounter doing chart review, review of test results, patient visit and documentation     "

## 2021-10-25 ENCOUNTER — OFFICE VISIT (OUTPATIENT)
Dept: INTERNAL MEDICINE | Facility: CLINIC | Age: 51
End: 2021-10-25
Payer: COMMERCIAL

## 2021-10-25 VITALS
BODY MASS INDEX: 56.01 KG/M2 | WEIGHT: 242 LBS | OXYGEN SATURATION: 98 % | RESPIRATION RATE: 24 BRPM | HEART RATE: 98 BPM | SYSTOLIC BLOOD PRESSURE: 118 MMHG | DIASTOLIC BLOOD PRESSURE: 64 MMHG | TEMPERATURE: 98.6 F | HEIGHT: 55 IN

## 2021-10-25 DIAGNOSIS — R07.9 CHEST PAIN, UNSPECIFIED TYPE: ICD-10-CM

## 2021-10-25 DIAGNOSIS — Z01.818 PRE-OP EXAM: Primary | ICD-10-CM

## 2021-10-25 DIAGNOSIS — J44.9 COPD, MODERATE (H): ICD-10-CM

## 2021-10-25 DIAGNOSIS — D17.30 LIPOMA OF SKIN AND SUBCUTANEOUS TISSUE: ICD-10-CM

## 2021-10-25 PROCEDURE — 93000 ELECTROCARDIOGRAM COMPLETE: CPT | Performed by: INTERNAL MEDICINE

## 2021-10-25 PROCEDURE — 36415 COLL VENOUS BLD VENIPUNCTURE: CPT | Performed by: INTERNAL MEDICINE

## 2021-10-25 PROCEDURE — 84132 ASSAY OF SERUM POTASSIUM: CPT | Performed by: INTERNAL MEDICINE

## 2021-10-25 PROCEDURE — 99214 OFFICE O/P EST MOD 30 MIN: CPT | Performed by: INTERNAL MEDICINE

## 2021-10-25 RX ORDER — BUDESONIDE AND FORMOTEROL FUMARATE DIHYDRATE 160; 4.5 UG/1; UG/1
2 AEROSOL RESPIRATORY (INHALATION) 2 TIMES DAILY
Qty: 30.6 G | Refills: 3 | Status: SHIPPED | OUTPATIENT
Start: 2021-10-25 | End: 2023-02-21

## 2021-10-25 RX ORDER — ALBUTEROL SULFATE 90 UG/1
2 AEROSOL, METERED RESPIRATORY (INHALATION) EVERY 6 HOURS
Qty: 3 EACH | Refills: 3 | Status: SHIPPED | OUTPATIENT
Start: 2021-10-25 | End: 2022-09-15

## 2021-10-25 RX ORDER — NITROGLYCERIN 0.4 MG/1
TABLET SUBLINGUAL
Qty: 25 TABLET | Refills: 11 | Status: SHIPPED | OUTPATIENT
Start: 2021-10-25 | End: 2023-10-24

## 2021-10-25 ASSESSMENT — MIFFLIN-ST. JEOR: SCORE: 1526.01

## 2021-10-25 NOTE — PROGRESS NOTES
[place] [address]  [facility] [address]  Phone: [phone]  Primary Provider: Roro Chawla  Pre-op Performing Provider: DERRICK LOTT      PREOPERATIVE EVALUATION:  Today's date: 10/25/2021    Swetha Patterson is a 51 year old female who presents for a preoperative evaluation.    Surgical Information:  Surgery/Procedure: Excision left shoulder lipoma   Surgery Location: [facility]   Surgeon: Ubaldo   Surgery Date: 11/3/21  Time of Surgery: 10:00 am   Where patient plans to recover: At home with family  Fax number for surgical facility: Note does not need to be faxed, will be available electronically in Epic.    Type of Anesthesia Anticipated: to be determined    Assessment & Plan     The proposed surgical procedure is considered LOW risk.    Pre-op exam    - EKG 12-lead complete w/read - Clinics  - Potassium    Lipoma of skin and subcutaneous tissue           Risks and Recommendations:  The patient has the following additional risks and recommendations for perioperative complications:  Pulmonary:    - Incentive spirometry post-op    Medication Instructions:  She will take only lisinopril the morning of surgery.    RECOMMENDATION:  APPROVAL GIVEN to proceed with proposed procedure, without further diagnostic evaluation.a lipoma on her shoulder that will be removed    Subjective     HPI related to upcoming procedure: She has a lipoma on her shoulder that is going to be removed.    Preop Questions 10/25/2021   1. Have you ever had a heart attack or stroke? No   2. Have you ever had surgery on your heart or blood vessels, such as a stent placement, a coronary artery bypass, or surgery on an artery in your head, neck, heart, or legs? No   3. Do you have chest pain with activity? No   4. Do you have a history of  heart failure? No   5. Do you currently have a cold, bronchitis or symptoms of other infection? No   6. Do you have a cough, shortness  of breath, or wheezing? No   7. Do you or anyone in your family have previous history of blood clots? No   8. Do you or does anyone in your family have a serious bleeding problem such as prolonged bleeding following surgeries or cuts? No   9. Have you ever had problems with anemia or been told to take iron pills? No   10. Have you had any abnormal blood loss such as black, tarry or bloody stools, or abnormal vaginal bleeding? No   11. Have you ever had a blood transfusion? No   12. Are you willing to have a blood transfusion if it is medically needed before, during, or after your surgery? NO -    13. Have you or any of your relatives ever had problems with anesthesia? No   14. Do you have sleep apnea, excessive snoring or daytime drowsiness? No   15. Do you have any artifical heart valves or other implanted medical devices like a pacemaker, defibrillator, or continuous glucose monitor? No   16. Do you have artificial joints? No   17. Are you allergic to latex? No   18. Is there any chance that you may be pregnant? No       Health Care Directive:  Patient does not have a Health Care Directive or Living Will:     Preoperative Review of :   reviewed - controlled substances reflected in medication list.      Status of Chronic Conditions:  COPD - Patient has a longstanding history of moderate-severe COPD . Patient has been doing well overall noting stable dyspnea on exertion  and continues on medication regimen consisting of inhalers without adverse reactions or side effects.     HYPERTENSION - Patient has longstanding history of HTN , currently denies any symptoms referable to elevated blood pressure. Specifically denies chest pain, palpitations, dyspnea, orthopnea, PND or peripheral edema. Blood pressure readings have been in normal range. Current medication regimen is as listed below. Patient denies any side effects of medication.         Review of Systems  GENERAL: negative for, fever, chills, weight loss,  weight gain  EYES: negative  ENT: negative  RESPIRATORY: No dyspnea on exertion and No cough  CARDIOVASCULAR: negative for, palpitations, tachycardia, irregular heart beat and chest pain  GI: negative for, abdominal pain, melena, hematochezia and positive for episodic nausea due to hiatal hernia  : negative for, dysuria and hematuria  MUSCULOSKELETAL: neck pain and shoulder   NEUROLOGIC: positive for headaches and numbness or tingling of hands, negative for, seizures and local weakness  SKIN: negative  ENDOCRINE: sugars well controlled       Patient Active Problem List    Diagnosis Date Noted     Personal history of tobacco use, presenting hazards to health 09/09/2020     Priority: Medium     Morbid obesity with BMI of 40.0-44.9, adult (H) 08/31/2020     Priority: Medium     Claudication of both lower extremities (H) 03/06/2020     Priority: Medium     PAD (peripheral artery disease) (H) 03/06/2020     Priority: Medium     Jaw claudication 03/06/2020     Priority: Medium     Added automatically from request for surgery [id number]       History of opioid abuse (H) Rx was stopped when she failed urine drug test 12/13/2019     Priority: Medium     HTN, goal below 140/90 01/12/2019     Priority: Medium     Vocal cord polyp 06/28/2018     Priority: Medium     Status post cervical spinal fusion 05/01/2018     Priority: Medium     Intentional drug overdose (H) 02/08/2018     Priority: Medium     DIAZ (nonalcoholic steatohepatitis) 06/17/2016     Priority: Medium     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  Doesn't drink, but relapsed (doesn't remember).  Was on Valium for 4 years (until October 2017). Xanax since 11/2017.  Managed by hospital.        Past Medical History:   Diagnosis Date     Anxiety state, unspecified      Asthma      Chronic pain     back pain from cyst     Contact dermatitis and other eczema, due to unspecified cause      COPD (chronic obstructive pulmonary disease) (H)      Depressive disorder, not elsewhere classified      Diabetes (H)      Emphysema with chronic bronchitis (H)      Esophageal reflux      Family history of ischemic heart disease      Fibromyalgia      Gastro-oesophageal reflux disease      Heart disease     has chest pain sometimes     History of emphysema      Hoarseness      HTN, goal below 140/90      Hyperlipidemia LDL goal <130      Liver disease     \"fatty liver\"     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)      PONV (postoperative nausea and vomiting)      Rectal bleeding      Past Surgical History:   Procedure Laterality Date     ANESTHESIA OUT OF OR MRI N/A 10/7/2021    Procedure: ANESTHESIA OUT OF OR MRI;  Surgeon: GENERIC ANESTHESIA PROVIDER;  Location: [facility] OR     ARTHROSCOPY SHOULDER DECOMPRESSION Left 10/21/2015    Procedure: ARTHROSCOPY SHOULDER DECOMPRESSION;  Surgeon: Julien Milian MD;  Location: [facility] OR     BIOPSY ARTERY TEMPORAL Left 3/11/2020    Procedure: LEFT TEMPORAL ARTERY BIOPSY;  Surgeon: Ibeth Conner MD;  Location: [facility] OR     BRONCHIAL THERMOPLASTY N/A 11/14/2014    Procedure: BRONCHIAL THERMOPLASTY;  Surgeon: Ward Whitaker MD;  [facility]: [facility] GI     BRONCHIAL THERMOPLASTY N/A 12/19/2014    Procedure: BRONCHIAL THERMOPLASTY;  Surgeon: Ward Whitaker MD;  [facility]     BRONCHIAL THERMOPLASTY N/A 2/6/2015    Procedure: BRONCHIAL THERMOPLASTY;  Surgeon: Ward Whitaker MD;  Location: [facility]     C APPENDECTOMY  at age " 17     COLONOSCOPY N/A 11/26/2018    Procedure: COLONOSCOPY ([facility]);  Surgeon: Marshall Oakes MD;  Location:  OR     COLONOSCOPY N/A 10/22/2020    Procedure: Colonoscopy;  Surgeon: Marshall Mccormick MD;  Location:  OR     DISCECTOMY, FUSION CERVICAL ANTERIOR ONE LEVEL, COMBINED N/A 5/1/2018    Procedure: COMBINED DISCECTOMY, FUSION CERVICAL ANTERIOR ONE LEVEL;  1.  C5-C6 anterior cervical diskectomy and fusion.    2.  C5-C6 application of intervertebral biomechanical device for interbody fusion purposes.    3.  C5-C6 anterior instrumentation using the standard Kristin InViZia 24 mm plate with four associated bone screws, 12 mm in length.  Inferior screws are fixed.  Superior screws are va     ENT SURGERY  2015    polyps removed from vocal cords      ESOPHAGOSCOPY, GASTROSCOPY, DUODENOSCOPY (EGD), COMBINED N/A 10/22/2020    Procedure: Esophagoscopy, gastroscopy, duodenoscopy with biopsies;  Surgeon: Marshall Mccormick MD;  Location:  OR     ESOPHAGOSCOPY, GASTROSCOPY, DUODENOSCOPY (EGD), COMBINED N/A 6/17/2021    Procedure: ESOPHAGOGASTRODUODENOSCOPY, WITH BIOPSY;  Surgeon: Darrel Damon MD;  Location:  GI     EXCISE NODE MEDIASTINAL  4/26/2013    Procedure: EXCISE NODE MEDIASTINAL;;  Surgeon: Av Peña MD;  Location:  OR     LAPAROSCOPIC CHOLECYSTECTOMY N/A 10/19/2017    Procedure: LAPAROSCOPIC CHOLECYSTECTOMY;  LAPAROSCOPIC CHOLECYSTECTOMY;  Surgeon: Ney Jerry MD;  Location:  OR     THORACOSCOPY  4/26/2013    Procedure: THORACOSCOPY;  LEFT VIDEO ASSISTED THORACOSCOPY, RESECTION OF POSTERIOR MEDIASTINAL MASS;  Surgeon: Av Peña MD;  Location:  OR     TONSILLECTOMY  as a kid     TONSILLECTOMY       Current Outpatient Medications   Medication Sig Dispense Refill     albuterol (PROVENTIL) (2.5 MG/3ML) 0.083% neb solution Take 1 vial (2.5 mg) by nebulization every 6 hours as needed for shortness of breath / dyspnea or wheezing 25 vial 3     atorvastatin  (LIPITOR) 80 MG tablet TAKE ONE TABLET BY MOUTH EVERY DAY 90 tablet 0     buPROPion (WELLBUTRIN XL) 150 MG 24 hr tablet TAKE ONE TABLET BY MOUTH EVERY MORNING 90 tablet 2     clindamycin 1 % EX external gel Apply topically 2 times daily 60 g 3     clonazePAM (KLONOPIN) 1 MG tablet Take 1 tablet (1 mg) by mouth 2 times daily as needed for anxiety She needs to see her PCP to get more tablets 5 tablet 0     DULoxetine (CYMBALTA) 20 MG capsule Take 1 capsule (20 mg) by mouth daily X 7 days and if well tolerated increase to 40 mg daily. 53 capsule 1     fluticasone (FLONASE) 50 MCG/ACT nasal spray Spray 2 sprays in nostril daily 16 g 5     Fluticasone-Umeclidin-Vilanterol (TRELEGY ELLIPTA) 100-62.5-25 MCG/INH oral inhaler Inhale 1 puff into the lungs daily 28 each 3     furosemide (LASIX) 20 MG tablet Take 1 tablet (20 mg) by mouth 2 times daily 180 tablet 0     glipiZIDE (GLUCOTROL XL) 2.5 MG 24 hr tablet TAKE ONE TABLET BY MOUTH EVERY DAY 90 tablet 1     guaiFENesin (MUCINEX) 600 MG 12 hr tablet Take 2 tablets (1,200 mg) by mouth 2 times daily 30 tablet 0     hydrocortisone 2.5 % cream APPLY TOPICALLY TO THE AFFECTED AREA(S) TWO TIMES A DAY 28 g 1     hydrOXYzine (ATARAX) 50 MG tablet TAKE TWO TABLETS BY MOUTH THREE TIMES A DAY AS NEEDED FOR ANXIETY MAX OF 70 TABLETS PER WEEK 70 tablet 5     lamoTRIgine (LAMICTAL) 150 MG tablet Take 150 mg by mouth daily       lisinopril (ZESTRIL) 20 MG tablet TAKE ONE TABLET BY MOUTH EVERY DAY 90 tablet 0     metFORMIN (GLUCOPHAGE) 500 MG tablet TAKE ONE TABLET BY MOUTH EVERY DAY WITH BREAKFAST 90 tablet 1     montelukast (SINGULAIR) 10 MG tablet Take 1 tablet (10 mg) by mouth At Bedtime 90 tablet 4     nitroGLYcerin (NITROSTAT) 0.4 MG sublingual tablet PLACE ONE TABLET UNDER THE TONGUE AT THE 1ST SIGN OF ATTACK. IF PAIN IS UNRELIEVED OR WORSENED 5 MINUTES AFTER 1ST DOSE, PROMPT MEDICAL ASSISTANCE IS NEEDED. MAY REPEAT EVERY 5 MINUTES UNTIL PAIN IS RELIEVED. MAX OF THREE DOSES 25  tablet 3     nystatin (MYCOSTATIN) 832051 UNIT/GM external powder APPLY TOPICALLY TWICE A DAY AS NEEDED SKIN RASH 45 g 3     omeprazole (PRILOSEC) 40 MG DR capsule TAKE ONE CAPSULE BY MOUTH TWICE A  capsule 0     ondansetron (ZOFRAN) 8 MG tablet TAKE ONE-HALF TO ONE TABLET BY MOUTH EVERY 8 HOURS AS NEEDED FOR NAUSEA 30 tablet 3     prochlorperazine (COMPAZINE) 10 MG tablet Take 1 tablet (10 mg) by mouth every 8 hours as needed for nausea or vomiting 30 tablet 0     sulfamethoxazole-trimethoprim (BACTRIM DS) 800-160 MG tablet Take 1 tablet by mouth 2 times daily Take one tablet twice daily. For additional refills, please schedule a follow-up appointment. 180 tablet 1     terbinafine (LAMISIL) 1 % external cream Apply topically 2 times daily 30 g 2     VENTOLIN  (90 Base) MCG/ACT inhaler INHALE TWO PUFFS BY MOUTH EVERY 6 HOURS AS NEEDED FOR SHORTNESS OF BREATH 18 g 1     cetirizine HCl 10 MG CAPS Take 1 capsule (10 mg) by mouth daily as needed Takes in the spring. 90 capsule 3     nystatin (MYCOSTATIN) 907204 UNIT/ML suspension TAKE 5 ML BY MOUTH FOUR TIMES A  mL 0     polyethylene glycol (MIRALAX) 17 GM/Dose powder Take 17 g (1 capful) by mouth daily 507 g 1       Allergies   Allergen Reactions     Codeine Nausea and Vomiting     vomiting     Cyclobenzaprine Nausea     Gabapentin Rash     Hydrocodone Nausea and Vomiting     Sulfa Drugs Nausea and Vomiting     Nausea        Social History     Tobacco Use     Smoking status: Former Smoker     Packs/day: 0.50     Years: 30.00     Pack years: 15.00     Types: Cigarettes     Start date: 1985     Quit date: 2021     Years since quittin.1     Smokeless tobacco: Never Used     Tobacco comment: Is trying to quit, using nicotine gum and patch   Substance Use Topics     Alcohol use: No     Alcohol/week: 0.0 standard drinks       History   Drug Use No         Objective       Physical Exam    Patient is alert, oriented, cooperative in no acute  "distress.  /64   Pulse 98   Temp 98.6  F (37  C) (Oral)   Resp 24   Ht 1.359 m (4' 5.5\")   Wt 109.8 kg (242 lb)   LMP 04/15/2016 (Exact Date)   SpO2 98%   BMI 59.44 kg/m      HEENT: PERRL, EOMI, TM's are normal.   NECK: No lymphadenopathy or thyromegaly. Carotid pulses full without bruits.  LUNGS: clear  CV: normal S1, S2 without murmur, S3 or S4 present. Pulses are 2/2 throughout. No JVD.  ABDOMEN: Nondistended, soft, nontender, no hepatosplenomegaly or masses      Recent Labs   Lab Test 10/07/21  0703 09/30/21  1203 05/22/21  0151 05/12/21  1733 05/12/21  1733 11/21/20  0628 11/20/20  0622 10/16/20  1507 09/29/20  1527 09/29/20  1527 05/18/20  1819 02/20/20  0920   HGB  --   --  12.4  --  13.8   < > 11.6* 12.6   < > 12.3   < > 14.5   PLT  --   --  349  --  387   < > 374  --    < > 351   < > 386   INR  --   --   --   --   --   --  0.99  --   --  0.94  --   --    NA  --   --  142  --  136   < > 136  --    < > 137   < > 141   POTASSIUM  --  4.2 3.6   < > 3.8   < > 4.3 4.8   < > 4.6   < > 4.3   CR 0.94  --  1.01   < > 0.98   < > 0.91  --    < > 1.07*   < > 0.71   A1C  --   --   --   --   --   --   --  5.6  --   --   --  6.0*    < > = values in this interval not displayed.        Diagnostics:  Recent Results (from the past 24 hour(s))   Potassium    Collection Time: 10/25/21  3:15 PM   Result Value Ref Range    Potassium 4.1 3.4 - 5.3 mmol/L      EKG: Normal Sinus Rhythm, normal axis, normal intervals, no acute ST/T changes c/w ischemia, no LVH by voltage criteria, unchanged from previous tracings    Revised Cardiac Risk Index (RCRI):  The patient has the following serious cardiovascular risks for perioperative complications:   - No serious cardiac risks = 0 points     RCRI Interpretation: 0 points: Class I (very low risk - 0.4% complication rate)           Signed Electronically by: Divya Malcolm MD  Copy of this evaluation report is provided to requesting physician.    ekg  "

## 2021-10-26 LAB — POTASSIUM BLD-SCNC: 4.1 MMOL/L (ref 3.4–5.3)

## 2021-10-29 DIAGNOSIS — R11.0 NAUSEA: ICD-10-CM

## 2021-10-29 DIAGNOSIS — J30.89 DUST ALLERGY: ICD-10-CM

## 2021-10-29 DIAGNOSIS — J44.9 COPD, MODERATE (H): ICD-10-CM

## 2021-10-29 DIAGNOSIS — F41.9 ANXIETY: ICD-10-CM

## 2021-10-30 ENCOUNTER — LAB (OUTPATIENT)
Dept: URGENT CARE | Facility: URGENT CARE | Age: 51
End: 2021-10-30
Payer: COMMERCIAL

## 2021-10-30 DIAGNOSIS — Z11.59 ENCOUNTER FOR SCREENING FOR OTHER VIRAL DISEASES: ICD-10-CM

## 2021-10-30 LAB — SARS-COV-2 RNA RESP QL NAA+PROBE: NEGATIVE

## 2021-10-30 PROCEDURE — U0003 INFECTIOUS AGENT DETECTION BY NUCLEIC ACID (DNA OR RNA); SEVERE ACUTE RESPIRATORY SYNDROME CORONAVIRUS 2 (SARS-COV-2) (CORONAVIRUS DISEASE [COVID-19]), AMPLIFIED PROBE TECHNIQUE, MAKING USE OF HIGH THROUGHPUT TECHNOLOGIES AS DESCRIBED BY CMS-2020-01-R: HCPCS

## 2021-10-30 PROCEDURE — U0005 INFEC AGEN DETEC AMPLI PROBE: HCPCS

## 2021-11-01 RX ORDER — HYDROXYZINE HYDROCHLORIDE 50 MG/1
TABLET, FILM COATED ORAL
Qty: 70 TABLET | Refills: 1 | Status: SHIPPED | OUTPATIENT
Start: 2021-11-01 | End: 2021-11-29

## 2021-11-01 RX ORDER — MONTELUKAST SODIUM 10 MG/1
TABLET ORAL
Qty: 90 TABLET | Refills: 0 | Status: SHIPPED | OUTPATIENT
Start: 2021-11-01 | End: 2022-02-01

## 2021-11-01 RX ORDER — ONDANSETRON 8 MG/1
TABLET, FILM COATED ORAL
Qty: 30 TABLET | Refills: 3 | Status: SHIPPED | OUTPATIENT
Start: 2021-11-01 | End: 2022-03-15

## 2021-11-02 ENCOUNTER — VIRTUAL VISIT (OUTPATIENT)
Dept: INTERNAL MEDICINE | Facility: CLINIC | Age: 51
End: 2021-11-02
Payer: COMMERCIAL

## 2021-11-02 ENCOUNTER — ANESTHESIA EVENT (OUTPATIENT)
Dept: SURGERY | Facility: CLINIC | Age: 51
End: 2021-11-02
Payer: COMMERCIAL

## 2021-11-02 ENCOUNTER — OFFICE VISIT (OUTPATIENT)
Dept: PODIATRY | Facility: CLINIC | Age: 51
End: 2021-11-02
Payer: COMMERCIAL

## 2021-11-02 VITALS
BODY MASS INDEX: 43.59 KG/M2 | HEART RATE: 84 BPM | DIASTOLIC BLOOD PRESSURE: 76 MMHG | WEIGHT: 246 LBS | SYSTOLIC BLOOD PRESSURE: 126 MMHG | HEIGHT: 63 IN

## 2021-11-02 DIAGNOSIS — L60.0 ONYCHOCRYPTOSIS: ICD-10-CM

## 2021-11-02 DIAGNOSIS — E66.813 CLASS 3 SEVERE OBESITY DUE TO EXCESS CALORIES WITHOUT SERIOUS COMORBIDITY WITH BODY MASS INDEX (BMI) OF 50.0 TO 59.9 IN ADULT (H): Primary | ICD-10-CM

## 2021-11-02 DIAGNOSIS — L30.9 ECZEMA, UNSPECIFIED TYPE: ICD-10-CM

## 2021-11-02 DIAGNOSIS — R73.03 PREDIABETES: ICD-10-CM

## 2021-11-02 DIAGNOSIS — E66.01 CLASS 3 SEVERE OBESITY DUE TO EXCESS CALORIES WITHOUT SERIOUS COMORBIDITY WITH BODY MASS INDEX (BMI) OF 50.0 TO 59.9 IN ADULT (H): Primary | ICD-10-CM

## 2021-11-02 DIAGNOSIS — E78.5 HYPERLIPIDEMIA LDL GOAL <130: Chronic | ICD-10-CM

## 2021-11-02 DIAGNOSIS — R21 RASH AND NONSPECIFIC SKIN ERUPTION: ICD-10-CM

## 2021-11-02 DIAGNOSIS — B35.1 ONYCHOMYCOSIS: Primary | ICD-10-CM

## 2021-11-02 PROCEDURE — 99203 OFFICE O/P NEW LOW 30 MIN: CPT | Mod: 25 | Performed by: PODIATRIST

## 2021-11-02 PROCEDURE — 99214 OFFICE O/P EST MOD 30 MIN: CPT | Mod: 95 | Performed by: INTERNAL MEDICINE

## 2021-11-02 PROCEDURE — 11720 DEBRIDE NAIL 1-5: CPT | Performed by: PODIATRIST

## 2021-11-02 ASSESSMENT — MIFFLIN-ST. JEOR: SCORE: 1694.98

## 2021-11-02 NOTE — PATIENT INSTRUCTIONS
Thank you for choosing [facility] Podiatry / Foot & Ankle Surgery!    DR TERRY'S CLINIC:  [facility]   [address]   [place] [address] [phone]      SET UP SURGERY: [phone]   RADIOLOGY: [phone]   APPOINTMENTS: [phone]   BILLING QUESTIONS: [phone]   FAX NUMBER: [phone]       Follow up: as needed              Flu vaccines are now available at all [facility] clinics and retail pharmacies across the [facility]. Appointments are required for clinic locations. To schedule an appointment online, please log into [employer] or create an account if you are a new user. You can also call [phone], or simply walk in at one of the [facility] retail pharmacy locations.    ROUTINE FOOT CARE (NAIL TRIMMING / CALLUSES)    Go to [email or web address] ([employer]) and search for providers near you.  Otherwise, this is a list we have gathered of  recommended locations/providers in [place].    [facility]   [phone]   [facility]  [phone]  [email or web address]   [facility]  [phone]  [place] + 15 mile radius [facility]  [phone]  [email or web address]   [facility]  [address] [place] [address]  [phone] Henok De La Torre DPM  [address] [place] [address] [phone]   [facility]  [phone] [address] [place] [address]  [facility]  Dr. Koffi Gaffney  [phone]  [facility]  [phone]  [facility]  (does not take BCBS) FYI:  *Some providers accept insurance while others are out of pocket. Please contact them for details*     Ingrown toenail Post-procedural instructions    - After the procedure, go home and elevate the involved foot for the remainder of the day/evening as able.  This is to minimize swelling, control pain, and limit post-procedural complications.  The  pre-procedural injection may cause your toe to be numb anywhere from1-2 hours.    - You can take Tylenol, Ibuprofen, Advil, etc as needed for pain if tolerated.  Follow label instructions      - If you have been given a prescription for antibiotics, take them as instructed and complete the prescription.    - Keep dressing intact until the following morning.  At that point, you may remove the bandage (you may need to soak it in warm soapy water as the bandage will likely adhere to your skin).  Soaking in warm soapy water for 5-10 minutes will also facilitate healing.  Wash the toe thoroughly, dry the toe thoroughly.  Apply antibiotic wound ointment to base of wound and cover with gauze and Coban dressing (not too tightly) until it stops draining.  This may take a few days to weeks, but at that point, you may continue with antibiotic ointment and a band-aid, or you may stop applying a dressing all together.  Dressing changes and soaks should be done twice daily(morning/evening) if you had the permanent/chemical procedure done.    - You may do activities to tolerance the following day.  Find a shoe that is comfortable and minimizes the amount of rubbing on your toe, as this may increase pain, swelling, etc.    - Monitor for signs of infection.  With this procedure, it is common to have mild surrounding redness and drainage.  If the redness involves the entire great toe or if you notice red streaks on top of your foot, or if you experience any nausea, vomiting, chills, fevers > 101 degrees, call clinic for a quick appointment.

## 2021-11-02 NOTE — PROGRESS NOTES
Swetha is a 51 year old who is being evaluated via a billable video visit.      How would you like to obtain your AVS? MyChart  If the video visit is dropped, the invitation should be resent by: Text to cell phone: [phone]  Will anyone else be joining your video visit? No         This is a VIDEO ( using Doximity)  encounter with the patient.       Location of the provider : office   Location of the patient : home      07:07 --- 07:29             Dr Low's note      Patient's instructions / PLAN:                                                        Plan:  1. Referral to bariatric surgery at [facility]  2. Nutritionist referral   3.Please make a lab appointment for fasting labs    4. Please make an appointment few days after the labs to discuss about the results. --- video is ok  5. Schedule annual exam with the OBGYN      ASSESSMENT & PLAN:                                                      (E66.01,  Z68.43) Class 3 severe obesity due to excess calories without serious comorbidity with body mass index (BMI) of 50.0 to 59.9 in adult (H)  (primary encounter diagnosis)  Comment:   Plan: Nutrition Referral, Comprehensive Weight         Management, CBC with platelets, CK total,         Hemoglobin A1c, Comprehensive metabolic panel,         Lipid panel reflex to direct LDL Fasting,         Albumin Random Urine Quantitative with Creat         Ratio, TSH with free T4 reflex            (R73.03) Prediabetes  Comment:   Plan: Nutrition Referral, Comprehensive Weight         Management, CBC with platelets, CK total,         Hemoglobin A1c, Comprehensive metabolic panel,         Lipid panel reflex to direct LDL Fasting,         Albumin Random Urine Quantitative with Creat         Ratio, TSH with free T4 reflex            (E78.5) Hyperlipidemia LDL goal <130  Comment:   Plan: Nutrition Referral, Comprehensive Weight         Management, CBC with platelets, CK total,         Hemoglobin A1c, Comprehensive metabolic panel,     "     Lipid panel reflex to direct LDL Fasting,         Albumin Random Urine Quantitative with Creat         Ratio, TSH with free T4 reflex               Chief complaint:                                                      Follow up chronic medical problems       SUBJECTIVE:                                                    History of present illness:    ABd pain  -- hernia  -- she states she had an appointment at the bariatric clinic. She doesn't remember the name. She was told she doesn't qualify for bariatric surgery or even meds. I reviewed the note.  -- Rukhsana was very upset how the appointment went and she is upset now she has no hope to help her with the abd pain and the weight. She would like a referral to [place]  -- she states that many times the food just sits at bottom at her stomach and causes nausea, pain and episodes of sweating.   -- NM gastric emptying study: delayed emptying       Review of Systems:                                                      ROS: negative for fever, chills, cough, wheezes, chest pain, shortness of breath, vomiting, abdominal pain, leg swelling       OBJECTIVE:           An actual physical exam can't be done during phone visit   A limited exam can sometimes be performed by video visit   NAD      PMHx: reviewed  Past Medical History:   Diagnosis Date     Anxiety state, unspecified      Asthma      Chronic pain     back pain from cyst     Contact dermatitis and other eczema, due to unspecified cause      COPD (chronic obstructive pulmonary disease) (H)      Depressive disorder, not elsewhere classified      Diabetes (H)      Emphysema with chronic bronchitis (H)      Esophageal reflux      Family history of ischemic heart disease      Fibromyalgia      Gastro-oesophageal reflux disease      Heart disease     has chest pain sometimes     History of emphysema      Hoarseness      HTN, goal below 140/90      Hyperlipidemia LDL goal <130      Liver disease     \"fatty liver\"     Polyp " of vocal cord or larynx (aka POLYPS)      PONV (postoperative nausea and vomiting)      Rectal bleeding       PSHx: reviewed  Past Surgical History:   Procedure Laterality Date     ANESTHESIA OUT OF OR MRI N/A 10/7/2021    Procedure: ANESTHESIA OUT OF OR MRI;  Surgeon: GENERIC ANESTHESIA PROVIDER;  Location: [facility]     ARTHROSCOPY SHOULDER DECOMPRESSION Left 10/21/2015    Procedure: ARTHROSCOPY SHOULDER DECOMPRESSION;  Surgeon: Julien Milian MD;  Location: [facility] OR     BIOPSY ARTERY TEMPORAL Left 3/11/2020    Procedure: LEFT TEMPORAL ARTERY BIOPSY;  Surgeon: Ibeth Conner MD;  Location: [facility]     BRONCHIAL THERMOPLASTY N/A 11/14/2014    Procedure: BRONCHIAL THERMOPLASTY;  Surgeon: Ward Whitaker MD;  [facility]     BRONCHIAL THERMOPLASTY N/A 12/19/2014    Procedure: BRONCHIAL THERMOPLASTY;  Surgeon: Ward Whitaker MD;  [facility]     BRONCHIAL THERMOPLASTY N/A 2/6/2015    Procedure: BRONCHIAL THERMOPLASTY;  Surgeon: Ward Whitaker MD;  [facility]     C APPENDECTOMY  at age 17     COLONOSCOPY N/A 11/26/2018    Procedure: COLONOSCOPY ([facility]);  Surgeon: Marshall Oakes MD;  Location: [facility] OR     COLONOSCOPY N/A 10/22/2020    Procedure: Colonoscopy;  Surgeon: Marshall Mccormick MD;  [facility]: [facility] OR     DISCECTOMY, FUSION CERVICAL ANTERIOR ONE LEVEL, COMBINED N/A 5/1/2018    Procedure: COMBINED DISCECTOMY, FUSION CERVICAL ANTERIOR ONE LEVEL;  1.  C5-C6 anterior cervical diskectomy and fusion.    2.  C5-C6 application of intervertebral biomechanical device for interbody fusion purposes.    3.  C5-C6 anterior instrumentation using the standard Kristin InViZia 24 mm plate with four associated bone screws, 12 mm in length.  Inferior screws are fixed.  Superior screws are va     ENT SURGERY  2015    polyps removed from vocal cords      ESOPHAGOSCOPY, GASTROSCOPY, DUODENOSCOPY (EGD), COMBINED N/A 10/22/2020    Procedure: Esophagoscopy, gastroscopy, duodenoscopy with biopsies;   Surgeon: Marshall Mccormick MD;  Location:  OR     ESOPHAGOSCOPY, GASTROSCOPY, DUODENOSCOPY (EGD), COMBINED N/A 6/17/2021    Procedure: ESOPHAGOGASTRODUODENOSCOPY, WITH BIOPSY;  Surgeon: Darrel Damon MD;  Location:  GI     EXCISE NODE MEDIASTINAL  4/26/2013    Procedure: EXCISE NODE MEDIASTINAL;;  Surgeon: Av Peña MD;  Location:  OR     LAPAROSCOPIC CHOLECYSTECTOMY N/A 10/19/2017    Procedure: LAPAROSCOPIC CHOLECYSTECTOMY;  LAPAROSCOPIC CHOLECYSTECTOMY;  Surgeon: Ney Jerry MD;  Location:  OR     THORACOSCOPY  4/26/2013    Procedure: THORACOSCOPY;  LEFT VIDEO ASSISTED THORACOSCOPY, RESECTION OF POSTERIOR MEDIASTINAL MASS;  Surgeon: Av Peña MD;  Location:  OR     TONSILLECTOMY  as a kid     TONSILLECTOMY          Meds: reviewed  Current Outpatient Medications   Medication Sig Dispense Refill     albuterol (PROVENTIL) (2.5 MG/3ML) 0.083% neb solution Take 1 vial (2.5 mg) by nebulization every 6 hours as needed for shortness of breath / dyspnea or wheezing 25 vial 3     albuterol (VENTOLIN HFA) 108 (90 Base) MCG/ACT inhaler Inhale 2 puffs into the lungs every 6 hours 3 each 3     atorvastatin (LIPITOR) 80 MG tablet TAKE ONE TABLET BY MOUTH EVERY DAY 90 tablet 0     benzoyl peroxide (ACNE-CLEAR) 10 % external gel Apply topically 2 times daily as needed       blood glucose (ACCU-CHEK OLINDA PLUS) test strip USE TO TEST BLOOD SUGAR ONE TIME DAILY OR AS DIRECTED 100 each 0     blood glucose (NO BRAND SPECIFIED) lancets standard Use to test blood sugar 1 times daily or as directed. 100 each 3     blood glucose (NO BRAND SPECIFIED) lancets standard Use to test blood sugar 1 time daily 100 each 1     blood glucose (NO BRAND SPECIFIED) test strip Use to test blood sugar 1 times daily or as directed.  Dispense Accu-chek Olinda Plus or per patient insurance 100 strip 3     blood glucose monitoring (NO BRAND SPECIFIED) meter device kit Use to test blood sugar 1 times daily or  as directed.  Dispense Accu-chek Olinda Plus or per insurance preference 1 kit 0     blood glucose monitoring (NO BRAND SPECIFIED) meter device kit Use to test blood sugar 1 times daily or as directed. 1 kit 0     budesonide-formoterol (SYMBICORT) 160-4.5 MCG/ACT Inhaler Inhale 2 puffs into the lungs 2 times daily 30.6 g 3     buPROPion (WELLBUTRIN XL) 150 MG 24 hr tablet TAKE ONE TABLET BY MOUTH EVERY MORNING 90 tablet 2     cetirizine HCl 10 MG CAPS Take 1 capsule (10 mg) by mouth daily as needed Takes in the spring. 90 capsule 3     clindamycin 1 % EX external gel Apply topically 2 times daily 60 g 3     clonazePAM (KLONOPIN) 1 MG tablet Take 1 tablet (1 mg) by mouth 2 times daily as needed for anxiety She needs to see her PCP to get more tablets 5 tablet 0     DULoxetine (CYMBALTA) 20 MG capsule Take 1 capsule (20 mg) by mouth daily X 7 days and if well tolerated increase to 40 mg daily. 53 capsule 1     fluticasone (FLONASE) 50 MCG/ACT nasal spray Spray 2 sprays in nostril daily 16 g 5     Fluticasone-Umeclidin-Vilanterol (TRELEGY ELLIPTA) 100-62.5-25 MCG/INH oral inhaler Inhale 1 puff into the lungs daily 28 each 11     furosemide (LASIX) 20 MG tablet Take 1 tablet (20 mg) by mouth 2 times daily 180 tablet 0     glipiZIDE (GLUCOTROL XL) 2.5 MG 24 hr tablet TAKE ONE TABLET BY MOUTH EVERY DAY 90 tablet 1     hydrocortisone 2.5 % cream APPLY TOPICALLY TO THE AFFECTED AREA(S) TWO TIMES A DAY 28 g 1     hydrOXYzine (ATARAX) 50 MG tablet TAKE TWO TABLETS BY MOUTH THREE TIMES A DAY AS NEEDED FOR ANXIETY. 70 tablet 1     lamoTRIgine (LAMICTAL) 150 MG tablet Take 150 mg by mouth daily       lisinopril (ZESTRIL) 20 MG tablet TAKE ONE TABLET BY MOUTH EVERY DAY 90 tablet 0     metFORMIN (GLUCOPHAGE) 500 MG tablet TAKE ONE TABLET BY MOUTH EVERY DAY WITH BREAKFAST 90 tablet 1     montelukast (SINGULAIR) 10 MG tablet TAKE ONE TABLET BY MOUTH AT BEDTIME 90 tablet 0     nitroGLYcerin (NITROSTAT) 0.4 MG sublingual tablet PLACE  ONE TABLET UNDER THE TONGUE AT THE 1ST SIGN OF ATTACK. IF PAIN IS UNRELIEVED OR WORSENED 5 MINUTES AFTER 1ST DOSE, PROMPT MEDICAL ASSISTANCE IS NEEDED. MAY REPEAT EVERY 5 MINUTES UNTIL PAIN IS RELIEVED. MAX OF THREE DOSES 25 tablet 11     nystatin (MYCOSTATIN) 785530 UNIT/GM external powder APPLY TOPICALLY TWICE A DAY AS NEEDED SKIN RASH 45 g 3     nystatin (MYCOSTATIN) 413095 UNIT/ML suspension TAKE 5 ML BY MOUTH FOUR TIMES A  mL 0     omeprazole (PRILOSEC) 40 MG DR capsule TAKE ONE CAPSULE BY MOUTH TWICE A  capsule 0     ondansetron (ZOFRAN) 8 MG tablet TAKE ONE-HALF TO ONE TABLET BY MOUTH EVERY 8 HOURS AS NEEDED FOR NAUSEA 30 tablet 3     polyethylene glycol (MIRALAX) 17 GM/Dose powder Take 17 g (1 capful) by mouth daily 507 g 1     prochlorperazine (COMPAZINE) 10 MG tablet Take 1 tablet (10 mg) by mouth every 8 hours as needed for nausea or vomiting 30 tablet 0     sulfamethoxazole-trimethoprim (BACTRIM DS) 800-160 MG tablet Take 1 tablet by mouth 2 times daily Take one tablet twice daily. For additional refills, please schedule a follow-up appointment. 180 tablet 1     terbinafine (LAMISIL) 1 % external cream Apply topically 2 times daily 30 g 2     VENTOLIN  (90 Base) MCG/ACT inhaler INHALE TWO PUFFS BY MOUTH EVERY 6 HOURS AS NEEDED FOR SHORTNESS OF BREATH 18 g 1       Soc Hx: reviewed  Fam Hx: reviewed          Roro Low MD  Internal Medicine

## 2021-11-02 NOTE — PROGRESS NOTES
"Foot & Ankle Surgery  November 2, 2021    CC: \" Left big toe hurts may be fungal\"    I was asked to see Swetha Patterson regarding the chief complaint by:  self    HPI:  Pt is a 51 year old female who presents with above complaint.  1 year history of left big toe issues.  Describes throbbing and shooting.  Tender \"if I bump\".  Pain for \"every day\", worse \"if I bumped it\".  She has tried \"fungus cream soaking for treatment.  Left hallux nail is ingrown medially and the nail is thickened, mycotic, painful.    ROS:   Pos for CC.  The patient denies current nausea, vomiting, chills, fevers, belly pain, calf pain, chest pain or SOB.  Complete remainder of ROS is otherwise neg.    VITALS:    Vitals:    11/02/21 0903   BP: 126/76   Pulse: 84   Weight: 111.6 kg (246 lb)   Height: 1.6 m (5' 3\")       PMH:    Past Medical History:   Diagnosis Date     Anxiety state, unspecified      Asthma      Chronic pain     back pain from cyst     Contact dermatitis and other eczema, due to unspecified cause      COPD (chronic obstructive pulmonary disease) (H)      Depressive disorder, not elsewhere classified      Diabetes (H)      Emphysema with chronic bronchitis (H)      Esophageal reflux      Family history of ischemic heart disease      Fibromyalgia      Gastro-oesophageal reflux disease      Heart disease     has chest pain sometimes     History of emphysema      Hoarseness      HTN, goal below 140/90      Hyperlipidemia LDL goal <130      Liver disease     \"fatty liver\"     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)      PONV (postoperative nausea and vomiting)      Rectal bleeding        SXHX:    Past Surgical History:   Procedure Laterality Date     ANESTHESIA OUT OF OR MRI N/A 10/7/2021    Procedure: ANESTHESIA OUT OF OR MRI;  Surgeon: GENERIC ANESTHESIA PROVIDER;  Location:  OR     ARTHROSCOPY SHOULDER DECOMPRESSION Left 10/21/2015    Procedure: ARTHROSCOPY SHOULDER DECOMPRESSION;  Surgeon: Julien Milian MD;  Location: " [facility] OR     BIOPSY ARTERY TEMPORAL Left 3/11/2020    Procedure: LEFT TEMPORAL ARTERY BIOPSY;  Surgeon: Ibeth Conner MD;  Location: [facility]     BRONCHIAL THERMOPLASTY N/A 11/14/2014    Procedure: BRONCHIAL THERMOPLASTY;  Surgeon: Ward Whitaker MD;  [facility]     BRONCHIAL THERMOPLASTY N/A 12/19/2014    Procedure: BRONCHIAL THERMOPLASTY;  Surgeon: Ward Whitaker MD;  [facility]     BRONCHIAL THERMOPLASTY N/A 2/6/2015    Procedure: BRONCHIAL THERMOPLASTY;  Surgeon: Ward Whitaker MD;  [facility]     C APPENDECTOMY  at age 17     COLONOSCOPY N/A 11/26/2018    Procedure: COLONOSCOPY ([facility]);  Surgeon: Marshall Oakes MD;  Location:  OR     COLONOSCOPY N/A 10/22/2020    Procedure: Colonoscopy;  Surgeon: Marshall Mccormick MD;  [facility]     DISCECTOMY, FUSION CERVICAL ANTERIOR ONE LEVEL, COMBINED N/A 5/1/2018    Procedure: COMBINED DISCECTOMY, FUSION CERVICAL ANTERIOR ONE LEVEL;  1.  C5-C6 anterior cervical diskectomy and fusion.    2.  C5-C6 application of intervertebral biomechanical device for interbody fusion purposes.    3.  C5-C6 anterior instrumentation using the standard Kristin InViZia 24 mm plate with four associated bone screws, 12 mm in length.  Inferior screws are fixed.  Superior screws are va     ENT SURGERY  2015    polyps removed from vocal cords      ESOPHAGOSCOPY, GASTROSCOPY, DUODENOSCOPY (EGD), COMBINED N/A 10/22/2020    Procedure: Esophagoscopy, gastroscopy, duodenoscopy with biopsies;  Surgeon: Marshall Mccormick MD;  Location:  OR     ESOPHAGOSCOPY, GASTROSCOPY, DUODENOSCOPY (EGD), COMBINED N/A 6/17/2021    Procedure: ESOPHAGOGASTRODUODENOSCOPY, WITH BIOPSY;  Surgeon: Darrel Damon MD;  Location:  GI     EXCISE NODE MEDIASTINAL  4/26/2013    Procedure: EXCISE NODE MEDIASTINAL;;  Surgeon: Av Peña MD;  Location:  OR     LAPAROSCOPIC CHOLECYSTECTOMY N/A 10/19/2017    Procedure: LAPAROSCOPIC CHOLECYSTECTOMY;  LAPAROSCOPIC  CHOLECYSTECTOMY;  Surgeon: Ney Jerry MD;  Location:  OR     THORACOSCOPY  4/26/2013    Procedure: THORACOSCOPY;  LEFT VIDEO ASSISTED THORACOSCOPY, RESECTION OF POSTERIOR MEDIASTINAL MASS;  Surgeon: Av Peña MD;  Location:  OR     TONSILLECTOMY  as a kid     TONSILLECTOMY          MEDS:    Current Outpatient Medications   Medication     albuterol (PROVENTIL) (2.5 MG/3ML) 0.083% neb solution     albuterol (VENTOLIN HFA) 108 (90 Base) MCG/ACT inhaler     atorvastatin (LIPITOR) 80 MG tablet     benzoyl peroxide (ACNE-CLEAR) 10 % external gel     blood glucose (ACCU-CHEK ALLYSON PLUS) test strip     blood glucose (NO BRAND SPECIFIED) lancets standard     blood glucose (NO BRAND SPECIFIED) lancets standard     blood glucose (NO BRAND SPECIFIED) test strip     blood glucose monitoring (NO BRAND SPECIFIED) meter device kit     blood glucose monitoring (NO BRAND SPECIFIED) meter device kit     budesonide-formoterol (SYMBICORT) 160-4.5 MCG/ACT Inhaler     buPROPion (WELLBUTRIN XL) 150 MG 24 hr tablet     cetirizine HCl 10 MG CAPS     clindamycin 1 % EX external gel     clonazePAM (KLONOPIN) 1 MG tablet     DULoxetine (CYMBALTA) 20 MG capsule     fluticasone (FLONASE) 50 MCG/ACT nasal spray     Fluticasone-Umeclidin-Vilanterol (TRELEGY ELLIPTA) 100-62.5-25 MCG/INH oral inhaler     furosemide (LASIX) 20 MG tablet     glipiZIDE (GLUCOTROL XL) 2.5 MG 24 hr tablet     hydrocortisone 2.5 % cream     hydrOXYzine (ATARAX) 50 MG tablet     lamoTRIgine (LAMICTAL) 150 MG tablet     lisinopril (ZESTRIL) 20 MG tablet     metFORMIN (GLUCOPHAGE) 500 MG tablet     montelukast (SINGULAIR) 10 MG tablet     nitroGLYcerin (NITROSTAT) 0.4 MG sublingual tablet     nystatin (MYCOSTATIN) 692322 UNIT/GM external powder     nystatin (MYCOSTATIN) 748193 UNIT/ML suspension     omeprazole (PRILOSEC) 40 MG DR capsule     ondansetron (ZOFRAN) 8 MG tablet     polyethylene glycol (MIRALAX) 17 GM/Dose powder     prochlorperazine  "(COMPAZINE) 10 MG tablet     sulfamethoxazole-trimethoprim (BACTRIM DS) 800-160 MG tablet     terbinafine (LAMISIL) 1 % external cream     VENTOLIN  (90 Base) MCG/ACT inhaler     No current facility-administered medications for this visit.       ALL:     Allergies   Allergen Reactions     Codeine Nausea and Vomiting     vomiting     Cyclobenzaprine Nausea     Gabapentin Rash     Hydrocodone Nausea and Vomiting     Sulfa Drugs Nausea and Vomiting     Nausea       FMH:    Family History   Problem Relation Age of Onset     Heart Disease Mother         murmur, mi     Hypertension Mother      Cerebrovascular Disease Mother      Depression Mother      Gallbladder Disease Mother      Asthma Mother      Family History Negative Father         does not know  him     Family History Negative Brother      Heart Disease Maternal Grandfather      Asthma Maternal Grandfather      Hypertension Maternal Grandfather      Cerebrovascular Disease Maternal Grandfather      Diabetes Maternal Grandfather      Asthma Sister      Hypertension Sister      Cerebrovascular Disease Sister      Hypertension Maternal Grandmother      Cerebrovascular Disease Maternal Grandmother        SocHx:    Social History     Socioeconomic History     Marital status:      Spouse name: Not on file     Number of children: Not on file     Years of education: Not on file     Highest education level: Not on file   Occupational History     Not on file   Tobacco Use     Smoking status: Former Smoker     Packs/day: 0.50     Years: 30.00     Pack years: 15.00     Types: Cigarettes     Start date: 1985     Quit date: 2021     Years since quittin.1     Smokeless tobacco: Never Used     Tobacco comment: Is trying to quit, using nicotine gum and patch   Vaping Use     Vaping Use: Never used   Substance and Sexual Activity     Alcohol use: No     Alcohol/week: 0.0 standard drinks     Comment: \"once in a blue moon\", none for five months     Drug " use: No     Sexual activity: Not Currently     Partners: Male     Birth control/protection: None   Other Topics Concern      Service Not Asked     Blood Transfusions Not Asked     Caffeine Concern No     Comment: 4-5 cans of pop daily     Occupational Exposure Not Asked     Hobby Hazards Not Asked     Sleep Concern Not Asked     Stress Concern Not Asked     Weight Concern Not Asked     Special Diet No     Back Care Not Asked     Exercise No     Comment: on hold     Bike Helmet Not Asked     Seat Belt Not Asked     Self-Exams Not Asked     Parent/sibling w/ CABG, MI or angioplasty before 65F 55M? Not Asked   Social History Narrative     Not on file     Social Determinants of Health     Financial Resource Strain:      Difficulty of Paying Living Expenses:    Food Insecurity:      Worried About Running Out of Food in the Last Year:      Ran Out of Food in the Last Year:    Transportation Needs:      Lack of Transportation (Medical):      Lack of Transportation (Non-Medical):    Physical Activity:      Days of Exercise per Week:      Minutes of Exercise per Session:    Stress:      Feeling of Stress :    Social Connections:      Frequency of Communication with Friends and Family:      Frequency of Social Gatherings with Friends and Family:      Attends Taoism Services:      Active Member of Clubs or Organizations:      Attends Club or Organization Meetings:      Marital Status:    Intimate Partner Violence:      Fear of Current or Ex-Partner:      Emotionally Abused:      Physically Abused:      Sexually Abused:            EXAMINATION:  Gen:   No apparent distress  Neuro:   A&Ox3, no deficits  Psych:    Answering questions appropriately for age and situation with normal affect  Head:    NCAT  Eye:    Visual scanning without deficit  Ear:    Response to auditory stimuli wnl  Lung:    Non-labored breathing on RA noted  Abd:    NTND per patient report  Lymph:    Neg for pitting/non-pitting edema BLE  Vasc:     Pulses palpable, CFT minimally delayed  Neuro:    Light touch sensation intact to all sensory nerve distributions without paresthesias  Derm:    Left hallux nail -onychocryptosis medial border, very low-grade inflammation without signs of infection, but very tender to palpation.  The left hallux nail is very thickened, dystrophic/mycotic.  There is some dry discolored skin around her heels and on her left great toe without pigmentation change or erosions noted  MSK:    ROM, strength wnl without limitation, no pain on palpation noted.  Calf:    Neg for redness, swelling or tenderness      Assessment:  51 year old female with onychocryptosis medial left hallux in setting of mycotic/dystrophic nail, with underlying diabetes mellitus and fatty liver      Plan:  Discussed etiologies, anatomy and options  1.  Onychocryptosis medial left hallux in setting of mycotic/dystrophic nail, with underlying diabetes mellitus and fatty liver  -I personally reviewed and interpreted the patient's lower extremity history pertinent to today's visit, including imaging/labs, in preparation for initiating a treatment program.  -Slant back performed, the leading edge was removed with good initial pain relief.  Our nail care handout was dispensed for future routine nail care options  -We also discussed the option for removal, including temporary versus permanent avulsion.  I recommend permanent avulsion to minimize the possibility of recurrence  -With respect to the onychomycosis, we discussed topical versus oral treatment options.  I do not typically use topical anymore based on limited efficacy, this is also similar with oral medications.  As she has a fatty liver, I advise against oral treatment.  We also discussed the options for removal, including total permanent removal.  She will consider if routine nail care fails to provide sufficient relief    Follow up: As needed or sooner with acute issues      Patient's medical history was  reviewed today      Davin Pringle DPM FACFAS FACFAOM  Podiatric Foot & Ankle Surgeon  [facility]  [phone]    Disclaimer: This note consists of symbols derived from keyboarding, dictation and/or voice recognition software. As a result, there may be errors in the script that have gone undetected. Please consider this when interpreting information found in this chart.

## 2021-11-02 NOTE — PATIENT INSTRUCTIONS
Plan:  1. Referral to bariatric surgery at [facility] -- [phone]  2. Nutritionist referral   3.Please make a lab appointment for fasting labs    4. Please make an appointment few days after the labs to discuss about the results. --- video is ok  5. Schedule annual exam with the OBGYN

## 2021-11-03 ENCOUNTER — APPOINTMENT (OUTPATIENT)
Dept: SURGERY | Facility: PHYSICIAN GROUP | Age: 51
End: 2021-11-03
Payer: COMMERCIAL

## 2021-11-03 ENCOUNTER — HOSPITAL ENCOUNTER (OUTPATIENT)
Facility: CLINIC | Age: 51
Discharge: HOME | End: 2021-11-03
Attending: SURGERY | Admitting: SURGERY
Payer: COMMERCIAL

## 2021-11-03 ENCOUNTER — ANESTHESIA (OUTPATIENT)
Dept: SURGERY | Facility: CLINIC | Age: 51
End: 2021-11-03
Payer: COMMERCIAL

## 2021-11-03 VITALS
HEIGHT: 64 IN | TEMPERATURE: 96.4 F | BODY MASS INDEX: 41.66 KG/M2 | DIASTOLIC BLOOD PRESSURE: 98 MMHG | SYSTOLIC BLOOD PRESSURE: 162 MMHG | RESPIRATION RATE: 22 BRPM | WEIGHT: 244 LBS | HEART RATE: 88 BPM | OXYGEN SATURATION: 95 %

## 2021-11-03 DIAGNOSIS — D17.22 LIPOMA OF LEFT SHOULDER: ICD-10-CM

## 2021-11-03 LAB — GLUCOSE BLDC GLUCOMTR-MCNC: 103 MG/DL (ref 70–99)

## 2021-11-03 PROCEDURE — 370N000017 HC ANESTHESIA TECHNICAL FEE, PER MIN: Performed by: SURGERY

## 2021-11-03 PROCEDURE — 258N000003 HC RX IP 258 OP 636: Performed by: ANESTHESIOLOGY

## 2021-11-03 PROCEDURE — 258N000003 HC RX IP 258 OP 636: Performed by: NURSE ANESTHETIST, CERTIFIED REGISTERED

## 2021-11-03 PROCEDURE — 250N000009 HC RX 250: Performed by: NURSE ANESTHETIST, CERTIFIED REGISTERED

## 2021-11-03 PROCEDURE — 23075 EXC SHOULDER LES SC < 3 CM: CPT | Mod: LT | Performed by: SURGERY

## 2021-11-03 PROCEDURE — 250N000011 HC RX IP 250 OP 636: Performed by: NURSE ANESTHETIST, CERTIFIED REGISTERED

## 2021-11-03 PROCEDURE — 710N000012 HC RECOVERY PHASE 2, PER MINUTE: Performed by: SURGERY

## 2021-11-03 PROCEDURE — 999N000141 HC STATISTIC PRE-PROCEDURE NURSING ASSESSMENT: Performed by: SURGERY

## 2021-11-03 PROCEDURE — 250N000009 HC RX 250: Performed by: SURGERY

## 2021-11-03 PROCEDURE — 360N000075 HC SURGERY LEVEL 2, PER MIN: Performed by: SURGERY

## 2021-11-03 PROCEDURE — 250N000011 HC RX IP 250 OP 636: Performed by: ANESTHESIOLOGY

## 2021-11-03 PROCEDURE — 82962 GLUCOSE BLOOD TEST: CPT

## 2021-11-03 PROCEDURE — 88304 TISSUE EXAM BY PATHOLOGIST: CPT | Mod: TC | Performed by: SURGERY

## 2021-11-03 PROCEDURE — 250N000013 HC RX MED GY IP 250 OP 250 PS 637: Performed by: ANESTHESIOLOGY

## 2021-11-03 PROCEDURE — 250N000011 HC RX IP 250 OP 636: Performed by: SURGERY

## 2021-11-03 PROCEDURE — 272N000001 HC OR GENERAL SUPPLY STERILE: Performed by: SURGERY

## 2021-11-03 PROCEDURE — 250N000009 HC RX 250: Performed by: ANESTHESIOLOGY

## 2021-11-03 PROCEDURE — 710N000009 HC RECOVERY PHASE 1, LEVEL 1, PER MIN: Performed by: SURGERY

## 2021-11-03 RX ORDER — EPHEDRINE SULFATE 50 MG/ML
25 INJECTION, SOLUTION INTRAVENOUS ONCE
Status: COMPLETED | OUTPATIENT
Start: 2021-11-03 | End: 2021-11-03

## 2021-11-03 RX ORDER — SODIUM CHLORIDE, SODIUM LACTATE, POTASSIUM CHLORIDE, CALCIUM CHLORIDE 600; 310; 30; 20 MG/100ML; MG/100ML; MG/100ML; MG/100ML
INJECTION, SOLUTION INTRAVENOUS CONTINUOUS
Status: DISCONTINUED | OUTPATIENT
Start: 2021-11-03 | End: 2021-11-03 | Stop reason: HOSPADM

## 2021-11-03 RX ORDER — FENTANYL CITRATE 50 UG/ML
INJECTION, SOLUTION INTRAMUSCULAR; INTRAVENOUS PRN
Status: DISCONTINUED | OUTPATIENT
Start: 2021-11-03 | End: 2021-11-03

## 2021-11-03 RX ORDER — OXYCODONE HYDROCHLORIDE 5 MG/1
5 TABLET ORAL ONCE
Status: DISCONTINUED | OUTPATIENT
Start: 2021-11-03 | End: 2021-11-03 | Stop reason: HOSPADM

## 2021-11-03 RX ORDER — SODIUM CHLORIDE, SODIUM LACTATE, POTASSIUM CHLORIDE, CALCIUM CHLORIDE 600; 310; 30; 20 MG/100ML; MG/100ML; MG/100ML; MG/100ML
INJECTION, SOLUTION INTRAVENOUS CONTINUOUS PRN
Status: DISCONTINUED | OUTPATIENT
Start: 2021-11-03 | End: 2021-11-03

## 2021-11-03 RX ORDER — ACETAMINOPHEN 325 MG/1
650 TABLET ORAL
Status: CANCELLED | OUTPATIENT
Start: 2021-11-03

## 2021-11-03 RX ORDER — ALBUTEROL SULFATE 0.83 MG/ML
2.5 SOLUTION RESPIRATORY (INHALATION) EVERY 4 HOURS PRN
Status: DISCONTINUED | OUTPATIENT
Start: 2021-11-03 | End: 2021-11-03 | Stop reason: HOSPADM

## 2021-11-03 RX ORDER — SCOLOPAMINE TRANSDERMAL SYSTEM 1 MG/1
1 PATCH, EXTENDED RELEASE TRANSDERMAL
Status: DISCONTINUED | OUTPATIENT
Start: 2021-11-03 | End: 2021-11-03 | Stop reason: HOSPADM

## 2021-11-03 RX ORDER — CEFAZOLIN SODIUM/WATER 2 G/20 ML
2 SYRINGE (ML) INTRAVENOUS SEE ADMIN INSTRUCTIONS
Status: DISCONTINUED | OUTPATIENT
Start: 2021-11-03 | End: 2021-11-03 | Stop reason: HOSPADM

## 2021-11-03 RX ORDER — DIPHENHYDRAMINE HYDROCHLORIDE 50 MG/ML
INJECTION INTRAMUSCULAR; INTRAVENOUS PRN
Status: DISCONTINUED | OUTPATIENT
Start: 2021-11-03 | End: 2021-11-03

## 2021-11-03 RX ORDER — ONDANSETRON 2 MG/ML
4 INJECTION INTRAMUSCULAR; INTRAVENOUS EVERY 30 MIN PRN
Status: DISCONTINUED | OUTPATIENT
Start: 2021-11-03 | End: 2021-11-03 | Stop reason: HOSPADM

## 2021-11-03 RX ORDER — PROMETHAZINE HYDROCHLORIDE 25 MG/ML
25 INJECTION INTRAMUSCULAR; INTRAVENOUS ONCE
Status: COMPLETED | OUTPATIENT
Start: 2021-11-03 | End: 2021-11-03

## 2021-11-03 RX ORDER — OXYCODONE HYDROCHLORIDE 5 MG/1
5 TABLET ORAL EVERY 4 HOURS PRN
Status: DISCONTINUED | OUTPATIENT
Start: 2021-11-03 | End: 2021-11-03 | Stop reason: HOSPADM

## 2021-11-03 RX ORDER — OXYCODONE HYDROCHLORIDE 5 MG/1
5 TABLET ORAL
Status: CANCELLED | OUTPATIENT
Start: 2021-11-03

## 2021-11-03 RX ORDER — OXYCODONE HYDROCHLORIDE 5 MG/1
5-10 TABLET ORAL EVERY 4 HOURS PRN
Qty: 12 TABLET | Refills: 0 | Status: SHIPPED | OUTPATIENT
Start: 2021-11-03 | End: 2021-11-08

## 2021-11-03 RX ORDER — LABETALOL HYDROCHLORIDE 5 MG/ML
10 INJECTION, SOLUTION INTRAVENOUS EVERY 5 MIN PRN
Status: DISCONTINUED | OUTPATIENT
Start: 2021-11-03 | End: 2021-11-03 | Stop reason: HOSPADM

## 2021-11-03 RX ORDER — LIDOCAINE HYDROCHLORIDE 10 MG/ML
INJECTION, SOLUTION INFILTRATION; PERINEURAL PRN
Status: DISCONTINUED | OUTPATIENT
Start: 2021-11-03 | End: 2021-11-03

## 2021-11-03 RX ORDER — ONDANSETRON 4 MG/1
4 TABLET, ORALLY DISINTEGRATING ORAL EVERY 6 HOURS PRN
Qty: 12 TABLET | Refills: 1 | Status: SHIPPED | OUTPATIENT
Start: 2021-11-03 | End: 2023-11-15

## 2021-11-03 RX ORDER — FENTANYL CITRATE 50 UG/ML
50 INJECTION, SOLUTION INTRAMUSCULAR; INTRAVENOUS EVERY 5 MIN PRN
Status: DISCONTINUED | OUTPATIENT
Start: 2021-11-03 | End: 2021-11-03 | Stop reason: HOSPADM

## 2021-11-03 RX ORDER — MEPERIDINE HYDROCHLORIDE 25 MG/ML
12.5 INJECTION INTRAMUSCULAR; INTRAVENOUS; SUBCUTANEOUS
Status: DISCONTINUED | OUTPATIENT
Start: 2021-11-03 | End: 2021-11-03 | Stop reason: HOSPADM

## 2021-11-03 RX ORDER — DEXAMETHASONE SODIUM PHOSPHATE 4 MG/ML
INJECTION, SOLUTION INTRA-ARTICULAR; INTRALESIONAL; INTRAMUSCULAR; INTRAVENOUS; SOFT TISSUE PRN
Status: DISCONTINUED | OUTPATIENT
Start: 2021-11-03 | End: 2021-11-03

## 2021-11-03 RX ORDER — ONDANSETRON 2 MG/ML
INJECTION INTRAMUSCULAR; INTRAVENOUS PRN
Status: DISCONTINUED | OUTPATIENT
Start: 2021-11-03 | End: 2021-11-03

## 2021-11-03 RX ORDER — FENTANYL CITRATE 50 UG/ML
25 INJECTION, SOLUTION INTRAMUSCULAR; INTRAVENOUS
Status: DISCONTINUED | OUTPATIENT
Start: 2021-11-03 | End: 2021-11-03 | Stop reason: HOSPADM

## 2021-11-03 RX ORDER — CEFAZOLIN SODIUM/WATER 2 G/20 ML
2 SYRINGE (ML) INTRAVENOUS
Status: COMPLETED | OUTPATIENT
Start: 2021-11-03 | End: 2021-11-03

## 2021-11-03 RX ORDER — ONDANSETRON 4 MG/1
4 TABLET, ORALLY DISINTEGRATING ORAL EVERY 30 MIN PRN
Status: DISCONTINUED | OUTPATIENT
Start: 2021-11-03 | End: 2021-11-03 | Stop reason: HOSPADM

## 2021-11-03 RX ORDER — HYDROMORPHONE HCL IN WATER/PF 6 MG/30 ML
0.4 PATIENT CONTROLLED ANALGESIA SYRINGE INTRAVENOUS EVERY 5 MIN PRN
Status: DISCONTINUED | OUTPATIENT
Start: 2021-11-03 | End: 2021-11-03 | Stop reason: HOSPADM

## 2021-11-03 RX ORDER — PROPOFOL 10 MG/ML
INJECTION, EMULSION INTRAVENOUS PRN
Status: DISCONTINUED | OUTPATIENT
Start: 2021-11-03 | End: 2021-11-03

## 2021-11-03 RX ORDER — BUPIVACAINE HYDROCHLORIDE 5 MG/ML
INJECTION, SOLUTION EPIDURAL; INTRACAUDAL PRN
Status: DISCONTINUED | OUTPATIENT
Start: 2021-11-03 | End: 2021-11-03 | Stop reason: HOSPADM

## 2021-11-03 RX ADMIN — MIDAZOLAM 2 MG: 1 INJECTION INTRAMUSCULAR; INTRAVENOUS at 10:24

## 2021-11-03 RX ADMIN — DIPHENHYDRAMINE HYDROCHLORIDE 12.5 MG: 50 INJECTION, SOLUTION INTRAMUSCULAR; INTRAVENOUS at 11:18

## 2021-11-03 RX ADMIN — ONDANSETRON HYDROCHLORIDE 4 MG: 2 INJECTION, SOLUTION INTRAVENOUS at 11:02

## 2021-11-03 RX ADMIN — EPHEDRINE SULFATE 25 MG: 50 INJECTION INTRAVENOUS at 11:33

## 2021-11-03 RX ADMIN — DEXAMETHASONE SODIUM PHOSPHATE 4 MG: 4 INJECTION, SOLUTION INTRA-ARTICULAR; INTRALESIONAL; INTRAMUSCULAR; INTRAVENOUS; SOFT TISSUE at 10:26

## 2021-11-03 RX ADMIN — LIDOCAINE HYDROCHLORIDE 50 MG: 10 INJECTION, SOLUTION INFILTRATION; PERINEURAL at 10:25

## 2021-11-03 RX ADMIN — Medication 2 G: at 10:20

## 2021-11-03 RX ADMIN — SODIUM CHLORIDE, POTASSIUM CHLORIDE, SODIUM LACTATE AND CALCIUM CHLORIDE: 600; 310; 30; 20 INJECTION, SOLUTION INTRAVENOUS at 12:37

## 2021-11-03 RX ADMIN — PROMETHAZINE HYDROCHLORIDE 25 MG: 25 INJECTION INTRAMUSCULAR; INTRAVENOUS at 11:33

## 2021-11-03 RX ADMIN — MIDAZOLAM 2 MG: 1 INJECTION INTRAMUSCULAR; INTRAVENOUS at 09:09

## 2021-11-03 RX ADMIN — FENTANYL CITRATE 50 MCG: 50 INJECTION INTRAMUSCULAR; INTRAVENOUS at 12:09

## 2021-11-03 RX ADMIN — ONDANSETRON HYDROCHLORIDE 4 MG: 2 INJECTION, SOLUTION INTRAVENOUS at 11:18

## 2021-11-03 RX ADMIN — SUGAMMADEX 200 MG: 100 INJECTION, SOLUTION INTRAVENOUS at 11:06

## 2021-11-03 RX ADMIN — FENTANYL CITRATE 50 MCG: 50 INJECTION INTRAMUSCULAR; INTRAVENOUS at 12:18

## 2021-11-03 RX ADMIN — FENTANYL CITRATE 100 MCG: 50 INJECTION, SOLUTION INTRAMUSCULAR; INTRAVENOUS at 10:25

## 2021-11-03 RX ADMIN — SODIUM CHLORIDE, POTASSIUM CHLORIDE, SODIUM LACTATE AND CALCIUM CHLORIDE: 600; 310; 30; 20 INJECTION, SOLUTION INTRAVENOUS at 10:23

## 2021-11-03 RX ADMIN — PROPOFOL 200 MG: 10 INJECTION, EMULSION INTRAVENOUS at 10:25

## 2021-11-03 RX ADMIN — ROCURONIUM BROMIDE 50 MG: 50 INJECTION, SOLUTION INTRAVENOUS at 10:26

## 2021-11-03 RX ADMIN — OXYCODONE HYDROCHLORIDE 5 MG: 5 TABLET ORAL at 12:30

## 2021-11-03 ASSESSMENT — MIFFLIN-ST. JEOR: SCORE: 1693.84

## 2021-11-03 ASSESSMENT — COPD QUESTIONNAIRES
COPD: 1
CAT_SEVERITY: MILD

## 2021-11-03 NOTE — OP NOTE
General Surgery Operative Note      Pre-operative diagnosis: Left shoulder subcutaneous mass   Post-operative diagnosis: Same    Procedure: Excision of left shoulder subcutaneous mass    Surgeon: Ibeth Conner MD   Assistant(s): None   Anesthesia: General    Estimated blood loss: 5 cc     Specimens: ID Type Source Tests Collected by Time Destination   1 : left shoulder lipoma Tissue Shoulder, Left SURGICAL PATHOLOGY EXAM Ibeth Conner MD 11/3/2021 10:55 AM           The left shoulder was prepped and draped in standard sterile fashion.  The skin overlying the mass was anesthetized with local anesthetic.  An incision was made with a length of 2.5 cm.  The incision was carried into the subcutaneous tissue and the mass was completely excised from surrounding tissues using a combination of Bovie electrocautery and blunt dissection.  The mass, which measured 2 cm in its longest dimension, was then passed off the field as specimen.  It had the appearance of a lipoma. Hemostasis was maintained throughout with electrocautery.  The wound was then irrigated with sterile saline and closed with 3-0 and 4-0 Vicryl subcuticular sutures, skin glue, and a steri-strip.  The patient tolerated the procedure well.  Sponge and needle counts were correct at the end of the case.     Ibeth Conner MD

## 2021-11-03 NOTE — ANESTHESIA PROCEDURE NOTES
Airway       Patient location during procedure: OR       Procedure Start/Stop Times: 11/3/2021 10:29 AM  Staff -        CRNA: Sally Rice APRN CRNA       Performed By: CRNA  Consent for Airway        Urgency: elective  Indications and Patient Condition       Indications for airway management: elizabeth-procedural       Induction type:intravenous       Mask difficulty assessment: 1 - vent by mask    Final Airway Details       Final airway type: endotracheal airway       Successful airway: ETT - single  Endotracheal Airway Details        ETT size (mm): 7.0       Cuffed: yes       Successful intubation technique: direct laryngoscopy       DL Blade Type: Hinds 2       Grade View of Cords: 1       Adjucts: stylet       Position: Right       Measured from: gums/teeth       Secured at (cm): 22       Bite block used: None    Post intubation assessment        Placement verified by: capnometry, equal breath sounds and chest rise        Number of attempts at approach: 1       Number of other approaches attempted: 0       Secured with: plastic tape       Ease of procedure: easy       Dentition: Intact

## 2021-11-03 NOTE — ANESTHESIA POSTPROCEDURE EVALUATION
Patient: Swetha Patterson    Procedure: Procedure(s):  EXCISION LEFT SHOULDER LIPOMA       Diagnosis:Lipoma of left shoulder [D17.22]  Diagnosis Additional Information: No value filed.    Anesthesia Type:  General    Note:     Postop Pain Control: Uneventful            Sign Out: Well controlled pain   PONV: No   Neuro/Psych: Uneventful            Sign Out: Acceptable/Baseline neuro status   Airway/Respiratory: Uneventful            Sign Out: AIRWAY IN SITU/Resp. Support   CV/Hemodynamics: Uneventful            Sign Out: Acceptable CV status   Other NRE: NONE   DID A NON-ROUTINE EVENT OCCUR? No           Last vitals:  Vitals Value Taken Time   /78 11/03/21 1200   Temp 97.3  F (36.3  C) 11/03/21 1124   Pulse 86 11/03/21 1205   Resp 15 11/03/21 1205   SpO2 96 % 11/03/21 1205   Vitals shown include unvalidated device data.    Electronically Signed By: Marshall Candelaria MD  November 3, 2021  12:07 PM

## 2021-11-03 NOTE — DISCHARGE INSTRUCTIONS
Dr Conner  Office Phone:  [phone]          HOME CARE FOLLOWING MINOR SURGERY        DRESSING  Keep dressing dry and in place until your doctor instructs you to remove the dressing.    DRAINAGE  There should be minimal drainage. If bleeding should occur and soaks through the dressing apply a sterile, dry dressing over it and tape in place. If bleeding persists, apply gentle, steady pressure with your hand over the dressing for 5 minutes. If the bleeding does not stop, call your doctor.    SKIN CLOSURE  You may have stitches or special skin closures. You will be given an appointment for the removal of any external stitches. You may have stitches under the skin which will absorb. You may have steri-strips over the incision. These look like thin tapes and will peel off in 5-7 days. If they don t after 7 days, you may carefully remove them. You may shower with the steri-strips but do not soak them, as with swimming or taking a bath. A protective covering of plastic may be placed over external stitches for showering.    NOTIFY YOUR PHYSICIAN IF YOU HAVE ANY OF THE FOLLOWING SYMPTOMS:    1. Fever greater than 101 degrees  2. Excessive bleeding or drainage  3. Disruption of the skin closure  4. Swelling, redness or excessive tenderness at the site  5. Severe pain  6. Drainage that is green, yellow, thick white or has a bad odor            Transderm Scop (Scopolamine) Patch    The Transderm Scop patch placed behind your ear helps to prevent nausea and vomiting associated with the use of anesthesia and certain analgesics used during or after many types surgery.  You will need to remove this patch after 3 days.  However, it may be removed sooner if you are no longer concerned about nausea or vomiting.      The most common side effects:  ?  dryness of the mouth  ?  drowsiness  ?  blurred vision  ?  dilation of pupils  ?  disorientation, memory disturbances and/or confusion  ?  dizziness  ?  restlessness  ?   hallucinations  ?  difficulty urinating  ?  skin rash  ?  red or painful eyes    Avoid drinking alcohol while using Transderm Scop patch.  Be careful about driving or operating any machinery while using this medication since it may make you drowsy.  In the unlikely event that you experience pain in the eye, which may be accompanied by widening of the pupil, eye redness and blurred vision, remove the Transderm Scop Patch immediately and consult your doctor.    Removal of Transderm Scop Patch:  To remove the patch simply peel it off your skin.  Be sure to wash your hands and the area behind your ear thoroughly with soap and water.  The Transderm Scop Patch will continue to have some active ingredient after use.  Therefore, to avoid accidental contact or ingestion by children or pets, fold the used patch in half with the sticky side together and dispose in the trash out of reach of children and pets.  If you wear contact lens, be sure to wash your hands first before handling contact lens.      Removal date:______________________.                GENERAL ANESTHESIA OR SEDATION ADULT DISCHARGE INSTRUCTIONS   SPECIAL PRECAUTIONS FOR 24 HOURS AFTER SURGERY    IT IS NOT UNUSUAL TO FEEL LIGHT-HEADED OR FAINT, UP TO 24 HOURS AFTER SURGERY OR WHILE TAKING PAIN MEDICATION.  IF YOU HAVE THESE SYMPTOMS; SIT FOR A FEW MINUTES BEFORE STANDING AND HAVE SOMEONE ASSIST YOU WHEN YOU GET UP TO WALK OR USE THE BATHROOM.    YOU SHOULD REST AND RELAX FOR THE NEXT 24 HOURS AND YOU MUST MAKE ARRANGEMENTS TO HAVE SOMEONE STAY WITH YOU FOR AT LEAST 24 HOURS AFTER YOUR DISCHARGE.  AVOID HAZARDOUS AND STRENUOUS ACTIVITIES.  DO NOT MAKE IMPORTANT DECISIONS FOR 24 HOURS.    DO NOT DRIVE ANY VEHICLE OR OPERATE MECHANICAL EQUIPMENT FOR 24 HOURS FOLLOWING THE END OF YOUR SURGERY.  EVEN THOUGH YOU MAY FEEL NORMAL, YOUR REACTIONS MAY BE AFFECTED BY THE MEDICATION YOU HAVE RECEIVED.    DO NOT DRINK ALCOHOLIC BEVERAGES FOR 24 HOURS FOLLOWING YOUR  SURGERY.    DRINK CLEAR LIQUIDS (APPLE JUICE, GINGER ALE, 7-UP, BROTH, ETC.).  PROGRESS TO YOUR REGULAR DIET AS YOU FEEL ABLE.    YOU MAY HAVE A DRY MOUTH, A SORE THROAT, MUSCLES ACHES OR TROUBLE SLEEPING.  THESE SHOULD GO AWAY AFTER 24 HOURS.    CALL YOUR DOCTOR FOR ANY OF THE FOLLOWING:  SIGNS OF INFECTION (FEVER, GROWING TENDERNESS AT THE SURGERY SITE, A LARGE AMOUNT OF DRAINAGE OR BLEEDING, SEVERE PAIN, FOUL-SMELLING DRAINAGE, REDNESS OR SWELLING.    IT HAS BEEN OVER 8 TO 10 HOURS SINCE SURGERY AND YOU ARE STILL NOT ABLE TO URINATE (PASS WATER).       You received 5mg Oxycodone at 12:30pm. You can have another dose after 4:30pm today IF NEEDED.     Maximum acetaminophen (Tylenol) dose from all sources should not exceed 4 grams (4000 mg) per day.    Maximum Ibuprofen dose from all sources should not exceed 3 grams (3000 mg) per day.

## 2021-11-03 NOTE — ANESTHESIA PREPROCEDURE EVALUATION
"Anesthesia Pre-Procedure Evaluation    Patient: Swetha Patterson   MRN: [id number] : 1970        Preoperative Diagnosis: Lipoma of left shoulder [D17.22]    Procedure : Procedure(s):  EXCISION LEFT SHOULDER LIPOMA          Past Medical History:   Diagnosis Date     Anxiety state, unspecified      Asthma      Chronic pain     back pain from cyst     Contact dermatitis and other eczema, due to unspecified cause      COPD (chronic obstructive pulmonary disease) (H)      Depressive disorder, not elsewhere classified      Diabetes (H)      Emphysema with chronic bronchitis (H)      Esophageal reflux      Family history of ischemic heart disease      Fibromyalgia      Gastro-oesophageal reflux disease      Heart disease     has chest pain sometimes     History of emphysema      Hoarseness      HTN, goal below 140/90      Hyperlipidemia LDL goal <130      Liver disease     \"fatty liver\"     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)      PONV (postoperative nausea and vomiting)      Rectal bleeding       Past Surgical History:   Procedure Laterality Date     ANESTHESIA OUT OF OR MRI N/A 10/7/2021    Procedure: ANESTHESIA OUT OF OR MRI;  Surgeon: GENERIC ANESTHESIA PROVIDER;  Location: [facility] OR     ARTHROSCOPY SHOULDER DECOMPRESSION Left 10/21/2015    Procedure: ARTHROSCOPY SHOULDER DECOMPRESSION;  Surgeon: Julien Milian MD;  Location: [facility] OR     BIOPSY ARTERY TEMPORAL Left 3/11/2020    Procedure: LEFT TEMPORAL ARTERY BIOPSY;  Surgeon: Ibeth Conner MD;  Location: [facility] OR     BRONCHIAL THERMOPLASTY N/A 2014    Procedure: BRONCHIAL THERMOPLASTY;  Surgeon: Ward Whitaker MD;  Location: [facility] GI     BRONCHIAL THERMOPLASTY N/A 2014    Procedure: BRONCHIAL THERMOPLASTY;  Surgeon: Ward Whitaker MD;  Location: [facility] OR     BRONCHIAL THERMOPLASTY N/A 2015    Procedure: BRONCHIAL THERMOPLASTY;  Surgeon: Ward Whitaker MD;  Location: [facility] OR     C APPENDECTOMY  at age 17     COLONOSCOPY " N/A 11/26/2018    Procedure: COLONOSCOPY ([facility]);  Surgeon: Marshall Oakes MD;  Location:  OR     COLONOSCOPY N/A 10/22/2020    Procedure: Colonoscopy;  Surgeon: Marshall Mccormick MD;  Location:  OR     DISCECTOMY, FUSION CERVICAL ANTERIOR ONE LEVEL, COMBINED N/A 5/1/2018    Procedure: COMBINED DISCECTOMY, FUSION CERVICAL ANTERIOR ONE LEVEL;  1.  C5-C6 anterior cervical diskectomy and fusion.    2.  C5-C6 application of intervertebral biomechanical device for interbody fusion purposes.    3.  C5-C6 anterior instrumentation using the standard Kristin InViZia 24 mm plate with four associated bone screws, 12 mm in length.  Inferior screws are fixed.  Superior screws are va     ENT SURGERY  2015    polyps removed from vocal cords      ESOPHAGOSCOPY, GASTROSCOPY, DUODENOSCOPY (EGD), COMBINED N/A 10/22/2020    Procedure: Esophagoscopy, gastroscopy, duodenoscopy with biopsies;  Surgeon: Marshall Mccormick MD;  Location:  OR     ESOPHAGOSCOPY, GASTROSCOPY, DUODENOSCOPY (EGD), COMBINED N/A 6/17/2021    Procedure: ESOPHAGOGASTRODUODENOSCOPY, WITH BIOPSY;  Surgeon: Darrel Damon MD;  Location:  GI     EXCISE NODE MEDIASTINAL  4/26/2013    Procedure: EXCISE NODE MEDIASTINAL;;  Surgeon: Av Peña MD;  Location:  OR     LAPAROSCOPIC CHOLECYSTECTOMY N/A 10/19/2017    Procedure: LAPAROSCOPIC CHOLECYSTECTOMY;  LAPAROSCOPIC CHOLECYSTECTOMY;  Surgeon: Ney Jerry MD;  Location:  OR     THORACOSCOPY  4/26/2013    Procedure: THORACOSCOPY;  LEFT VIDEO ASSISTED THORACOSCOPY, RESECTION OF POSTERIOR MEDIASTINAL MASS;  Surgeon: Av Peña MD;  Location:  OR     TONSILLECTOMY  as a kid     TONSILLECTOMY        Allergies   Allergen Reactions     Codeine Nausea and Vomiting     vomiting     Cyclobenzaprine Nausea     Gabapentin Rash     Hydrocodone Nausea and Vomiting     Sulfa Drugs Nausea and Vomiting     Nausea      Social History     Tobacco Use     Smoking status: Current Every Day  "Smoker     Packs/day: 0.50     Years: 30.00     Pack years: 15.00     Types: Cigarettes     Start date: 1/1/1985     Smokeless tobacco: Never Used     Tobacco comment: smokes 6 cigarettes a day   Substance Use Topics     Alcohol use: No     Alcohol/week: 0.0 standard drinks     Comment: \"once in a blue moon\", none for five months      Wt Readings from Last 1 Encounters:   11/03/21 110.7 kg (244 lb)        Anesthesia Evaluation   Pt has had prior anesthetic. Type: General.    No history of anesthetic complications       ROS/MED HX  ENT/Pulmonary:     (+) Intermittent, asthma mild,  COPD,     Neurologic:  - neg neurologic ROS     Cardiovascular:     (+) hypertension-----    METS/Exercise Tolerance:     Hematologic:  - neg hematologic  ROS     Musculoskeletal:  - neg musculoskeletal ROS     GI/Hepatic: Comment: Fatty liver    (+) GERD, Asymptomatic on medication, hepatitis liver disease,     Renal/Genitourinary:  - neg Renal ROS     Endo:     (+) type II DM, Obesity,     Psychiatric/Substance Use:  - neg psychiatric ROS     Infectious Disease:  - neg infectious disease ROS     Malignancy:  - neg malignancy ROS     Other:  - neg other ROS          Physical Exam    Airway        Mallampati: III   TM distance: > 3 FB   Neck ROM: full   Mouth opening: > 3 cm    Respiratory Devices and Support         Dental  no notable dental history         Cardiovascular   cardiovascular exam normal          Pulmonary   pulmonary exam normal                OUTSIDE LABS:  CBC:   Lab Results   Component Value Date    WBC 10.0 05/22/2021    WBC 12.0 (H) 05/12/2021    HGB 12.4 05/22/2021    HGB 13.8 05/12/2021    HCT 38.9 05/22/2021    HCT 44.0 05/12/2021     05/22/2021     05/12/2021     BMP:   Lab Results   Component Value Date     05/22/2021     05/12/2021    POTASSIUM 4.1 10/25/2021    POTASSIUM 4.2 09/30/2021    CHLORIDE 108 05/22/2021    CHLORIDE 105 05/12/2021    CO2 26 05/22/2021    CO2 26 05/12/2021    BUN " 14 05/22/2021    BUN 24 05/12/2021    CR 0.94 10/07/2021    CR 1.01 05/22/2021     (H) 11/03/2021     (H) 10/07/2021     COAGS:   Lab Results   Component Value Date    INR 0.99 11/20/2020     POC:   Lab Results   Component Value Date    BGM 90 11/21/2020    HCG Negative 10/21/2015    HCGS Negative 05/12/2021     HEPATIC:   Lab Results   Component Value Date    ALBUMIN 3.7 05/22/2021    PROTTOTAL 7.4 05/22/2021    ALT 80 (H) 05/22/2021    AST 21 05/22/2021    ALKPHOS 154 (H) 05/22/2021    BILITOTAL 0.2 05/22/2021     OTHER:   Lab Results   Component Value Date    PH 7.45 11/18/2014    LACT 2.0 11/20/2020    A1C 5.6 10/16/2020    GENE 8.9 05/22/2021    MAG 1.9 03/19/2013    LIPASE 101 05/22/2021    TSH 1.48 05/20/2021    T4 0.80 02/13/2015    CRP 16.0 (H) 02/20/2020    SED 16 02/20/2020       Anesthesia Plan    ASA Status:  3   NPO Status:  NPO Appropriate    Anesthesia Type: General.     - Airway: ETT   Induction: Intravenous, Propofol.   Maintenance: Balanced.        Consents    Anesthesia Plan(s) and associated risks, benefits, and realistic alternatives discussed. Questions answered and patient/representative(s) expressed understanding.     - Discussed with:  Patient         Postoperative Care    Pain management: IV analgesics, Oral pain medications, Multi-modal analgesia.   PONV prophylaxis: Ondansetron (or other 5HT-3), Dexamethasone or Solumedrol     Comments:                Marshall Candelaria MD

## 2021-11-03 NOTE — TELEPHONE ENCOUNTER
Seen 11/2/21.    Continued note.    Please refill as appropriate.  Thank you,    Daiana Contreras RN  [facility]

## 2021-11-04 ENCOUNTER — TELEPHONE (OUTPATIENT)
Dept: INTERNAL MEDICINE | Facility: CLINIC | Age: 51
End: 2021-11-04

## 2021-11-04 DIAGNOSIS — R05.9 COUGH: ICD-10-CM

## 2021-11-04 DIAGNOSIS — J44.1 COPD EXACERBATION (H): Primary | ICD-10-CM

## 2021-11-04 LAB
PATH REPORT.COMMENTS IMP SPEC: NORMAL
PATH REPORT.COMMENTS IMP SPEC: NORMAL
PATH REPORT.FINAL DX SPEC: NORMAL
PATH REPORT.GROSS SPEC: NORMAL
PATH REPORT.MICROSCOPIC SPEC OTHER STN: NORMAL
PATH REPORT.RELEVANT HX SPEC: NORMAL
PHOTO IMAGE: NORMAL

## 2021-11-04 PROCEDURE — 88304 TISSUE EXAM BY PATHOLOGIST: CPT | Mod: 26 | Performed by: PATHOLOGY

## 2021-11-04 RX ORDER — BENZONATATE 200 MG/1
200 CAPSULE ORAL 3 TIMES DAILY PRN
Qty: 60 CAPSULE | Refills: 1 | Status: SHIPPED | OUTPATIENT
Start: 2021-11-04 | End: 2022-02-16

## 2021-11-04 RX ORDER — AZITHROMYCIN 250 MG/1
TABLET, FILM COATED ORAL
Qty: 6 TABLET | Refills: 0 | Status: SHIPPED | OUTPATIENT
Start: 2021-11-04 | End: 2021-11-09

## 2021-11-04 RX ORDER — BLOOD SUGAR DIAGNOSTIC
STRIP MISCELLANEOUS
Qty: 100 STRIP | Refills: 3 | Status: SHIPPED | OUTPATIENT
Start: 2021-11-04 | End: 2022-11-11

## 2021-11-04 RX ORDER — HYDROCORTISONE 2.5 %
CREAM (GRAM) TOPICAL
Qty: 30 G | Refills: 1 | Status: SHIPPED | OUTPATIENT
Start: 2021-11-04 | End: 2022-02-11

## 2021-11-04 RX ORDER — PRENATAL VIT 91/IRON/FOLIC/DHA 28-975-200
COMBINATION PACKAGE (EA) ORAL
Qty: 30 G | Refills: 2 | Status: SHIPPED | OUTPATIENT
Start: 2021-11-04

## 2021-11-04 RX ORDER — PREDNISONE 20 MG/1
40 TABLET ORAL DAILY
Qty: 5 TABLET | Refills: 0 | Status: SHIPPED | OUTPATIENT
Start: 2021-11-04 | End: 2021-11-09

## 2021-11-04 NOTE — TELEPHONE ENCOUNTER
I pended the prescription, please sign the prescription to the pharmacy of choice.  Pharmacy is not chosen on this encounter

## 2021-11-04 NOTE — TELEPHONE ENCOUNTER
S-(situation): cough    B-(background): History of lung disease, asking for Prednisone, a Zpack and a cough medication.  Was at hospital yesterday for a same day surgery, Covid test 10/30/21 was negative.    A-(assessment): Patient complains of a harsh cough today productive of green phlegm states she really needs Prednisone, Zpack and a cough medication.    R-(recommendations): Will route message to primary care provider.    Pharmacy: Arina Chandra R.N.

## 2021-11-04 NOTE — TELEPHONE ENCOUNTER
Signed rxs and left message on patient's voicemail that her doctor sent prescriptions to her pharmacy.  SERJIO Chandra R.N.

## 2021-11-04 NOTE — TELEPHONE ENCOUNTER
Patient had same day surgery yesterday and this morning she woke up with a bad cough. She is coughing up green mucus. Patient uses [facility] in [place]  Ok to call and lm [phone]

## 2021-11-05 NOTE — TELEPHONE ENCOUNTER
Patient just found out that she was exposed to COVID-19 by neighbor.  Patient is asking to have another COVID-19.

## 2021-11-06 DIAGNOSIS — L73.2 HIDRADENITIS SUPPURATIVA: ICD-10-CM

## 2021-11-08 ENCOUNTER — NURSE TRIAGE (OUTPATIENT)
Dept: INTERNAL MEDICINE | Facility: CLINIC | Age: 51
End: 2021-11-08
Payer: COMMERCIAL

## 2021-11-08 ENCOUNTER — LAB (OUTPATIENT)
Dept: URGENT CARE | Facility: URGENT CARE | Age: 51
End: 2021-11-08
Attending: INTERNAL MEDICINE
Payer: COMMERCIAL

## 2021-11-08 DIAGNOSIS — D17.22 LIPOMA OF LEFT SHOULDER: ICD-10-CM

## 2021-11-08 DIAGNOSIS — R05.9 COUGH: ICD-10-CM

## 2021-11-08 PROCEDURE — U0003 INFECTIOUS AGENT DETECTION BY NUCLEIC ACID (DNA OR RNA); SEVERE ACUTE RESPIRATORY SYNDROME CORONAVIRUS 2 (SARS-COV-2) (CORONAVIRUS DISEASE [COVID-19]), AMPLIFIED PROBE TECHNIQUE, MAKING USE OF HIGH THROUGHPUT TECHNOLOGIES AS DESCRIBED BY CMS-2020-01-R: HCPCS

## 2021-11-08 PROCEDURE — U0005 INFEC AGEN DETEC AMPLI PROBE: HCPCS

## 2021-11-08 RX ORDER — OXYCODONE HYDROCHLORIDE 5 MG/1
5-10 TABLET ORAL EVERY 4 HOURS PRN
Qty: 10 TABLET | Refills: 0 | Status: SHIPPED | OUTPATIENT
Start: 2021-11-08 | End: 2021-11-30

## 2021-11-08 NOTE — TELEPHONE ENCOUNTER
Patient has already scheduled the Covid test for today.  She states she will go in and be seen in UC/ER if she continues to feel no better or her O2 sats drop again into the 80s.  SERJIO Chandra R.N.

## 2021-11-08 NOTE — TELEPHONE ENCOUNTER
She finished prednisone and took the last of her last zpack this morning. She is still feeling really sick. She does not have a fever. Her o2 sat was 83 when she was laying down, but now that she is sitting up it is back to 97.     She coughs up green mucus all day. Her cough sounds wet and productive. Says she coughs all day. She feels terrible.    Protocol says she should be seen in the clinic today due to her COPD. She would like message sent to provider to see if she needs something else done first. She has a covid test ordered. Should she come in and do the test? Does she need to give the abx more time? What should her next step be? Sally Phelps RN on 11/8/2021 at 11:13 AM    Reason for Disposition    Known COPD or other severe lung disease (i.e., bronchiectasis, cystic fibrosis, lung surgery) and worsening symptoms (i.e., increased sputum purulence or amount, increased breathing difficulty)    Additional Information    Negative: Bluish (or gray) lips or face    Negative: Severe difficulty breathing (e.g., struggling for each breath, speaks in single words)    Negative: Rapid onset of cough and has hives    Negative: Coughing started suddenly after medicine, an allergic food or bee sting    Negative: Difficulty breathing after exposure to flames, smoke, or fumes    Negative: Sounds like a life-threatening emergency to the triager    Negative: Previous asthma attacks and this feels like asthma attack    Negative: Chest pain present when not coughing    Negative: Difficulty breathing    Negative: Passed out (i.e., fainted, collapsed and was not responding)    Negative: Patient sounds very sick or weak to the triager    Negative: Coughed up > 1 tablespoon (15 ml) blood (Exception: blood-tinged sputum)    Negative: Fever > 103 F (39.4 C)    Negative: Fever > 101 F (38.3 C) and over 60 years of age    Negative: Fever > 100.0 F (37.8 C) and has diabetes mellitus or a weak immune system (e.g., HIV positive, cancer  "chemotherapy, organ transplant, splenectomy, chronic steroids)    Negative: Fever > 100.0 F (37.8 C) and bedridden (e.g., nursing home patient, stroke, chronic illness, recovering from surgery)    Negative: Increasing ankle swelling    Negative: Wheezing is present    Negative: SEVERE coughing spells (e.g., whooping sound after coughing, vomiting after coughing)    Negative: Coughing up keyla-colored (reddish-brown) or blood-tinged sputum    Negative: Fever present > 3 days (72 hours)    Negative: Fever returns after gone for over 24 hours and symptoms worse or not improved    Negative: Using nasal washes and pain medicine > 24 hours and sinus pain persists    Answer Assessment - Initial Assessment Questions  1. ONSET: \"When did the cough begin?\"       Last week.   2. SEVERITY: \"How bad is the cough today?\"       Moderate-severe  3. RESPIRATORY DISTRESS: \"Describe your breathing.\"       Has to cough when she takes a breath in.   4. FEVER: \"Do you have a fever?\" If so, ask: \"What is your temperature, how was it measured, and when did it start?\"      no  5. HEMOPTYSIS: \"Are you coughing up any blood?\" If so ask: \"How much?\" (flecks, streaks, tablespoons, etc.)      no  6. TREATMENT: \"What have you done so far to treat the cough?\" (e.g., meds, fluids, humidifier)      Prednisone, zpack  7. CARDIAC HISTORY: \"Do you have any history of heart disease?\" (e.g., heart attack, congestive heart failure)       no  8. LUNG HISTORY: \"Do you have any history of lung disease?\"  (e.g., pulmonary embolus, asthma, emphysema)      yes  9. PE RISK FACTORS: \"Do you have a history of blood clots?\" (or: recent major surgery, recent prolonged travel, bedridden)      no  10. OTHER SYMPTOMS: \"Do you have any other symptoms? (e.g., runny nose, wheezing, chest pain)        Stuffy nose  11. PREGNANCY: \"Is there any chance you are pregnant?\" \"When was your last menstrual period?\"        n/a  12. TRAVEL: \"Have you traveled out of the country in " "the last month?\" (e.g., travel history, exposures)        no    Protocols used: COUGH-A-[place]      "

## 2021-11-08 NOTE — TELEPHONE ENCOUNTER
She asked for a covid test - I signed the order    Considering her symptoms she needs to be evaluated - where appointments are available or UC

## 2021-11-08 NOTE — TELEPHONE ENCOUNTER
Name of caller: Patient    Reason for Call:  Pain med refill    Surgeon:  Dr. Conner    Recent Surgery:  Yes.    If yes, when & what type:  11/3/21 lipoma      Best phone number to reach pt at is: [phone]  Ok to leave a message with medical info? Yes.    Pharmacy preferred (if calling for a refill): [facility]

## 2021-11-08 NOTE — TELEPHONE ENCOUNTER
Surgery Type/Date:  Excision of L shoulder lipoma, 11/3/21.  Dr. Conner  Patient medication request:  Oxycodone 5mg  Refill request: first  Type/Amount of pain medication in current use:  Oxycodone 1 tab every 8 hours prn. Ice to surgery site.   Patient Symptoms:  Patient has one tab of oxycodone left.  Reports she cannot take ibuprofen due to digestive issues.  States area remains painful with movement and sore from bra rubbing on the area.  She would like refill to go to [facility] in [place].  Patient is aware one refill allowed over the telephone, if further need after this refill, she will need to be seen in the clinic.

## 2021-11-09 LAB — SARS-COV-2 RNA RESP QL NAA+PROBE: NEGATIVE

## 2021-11-09 RX ORDER — SULFAMETHOXAZOLE/TRIMETHOPRIM 800-160 MG
1 TABLET ORAL 2 TIMES DAILY
Qty: 180 TABLET | OUTPATIENT
Start: 2021-11-09

## 2021-11-09 NOTE — TELEPHONE ENCOUNTER
sulfamethoxazole-trimethoprim (BACTRIM DS) 800-160 MG tablet   Last Written Prescription Date:  4/30/2021  Last Fill Quantity: 180,   # refills: 1  Last Office Visit : 4/29/2021  Future Office visit:  None    Routing refill request to provider for review/approval because:  Drug not on the [facility] or [facility] refill protocol or controlled substance      Madonna Pang RN  Central Triage Red Flags/Med Refills

## 2021-11-09 NOTE — TELEPHONE ENCOUNTER
Received refill request for Bactrim as the resident on call. Reviewed patient's chart and attached communication. Patient last seen 7/2/2020 for hidradenitis suppurativa. RTC not yet scheduled and she did not show up to her 6 month follow-up Reviewed medication list and assessment and plan from last visit. The refill request is declined until patient follows up with an appointment and is seen.    The patient's information will be forwarded to the scheduling pool for return visit as planned at prior visit.    Сергей Newell MD  Dermatology Resident, PGY-4

## 2021-11-12 ENCOUNTER — HOSPITAL ENCOUNTER (EMERGENCY)
Facility: CLINIC | Age: 51
Discharge: HOME | End: 2021-11-12
Attending: EMERGENCY MEDICINE | Admitting: EMERGENCY MEDICINE
Payer: COMMERCIAL

## 2021-11-12 ENCOUNTER — TELEPHONE (OUTPATIENT)
Dept: DERMATOLOGY | Facility: CLINIC | Age: 51
End: 2021-11-12

## 2021-11-12 ENCOUNTER — APPOINTMENT (OUTPATIENT)
Dept: GENERAL RADIOLOGY | Facility: CLINIC | Age: 51
End: 2021-11-12
Attending: EMERGENCY MEDICINE
Payer: COMMERCIAL

## 2021-11-12 ENCOUNTER — TELEPHONE (OUTPATIENT)
Dept: INTERNAL MEDICINE | Facility: CLINIC | Age: 51
End: 2021-11-12
Payer: COMMERCIAL

## 2021-11-12 VITALS
WEIGHT: 241 LBS | DIASTOLIC BLOOD PRESSURE: 84 MMHG | OXYGEN SATURATION: 97 % | SYSTOLIC BLOOD PRESSURE: 122 MMHG | BODY MASS INDEX: 42.7 KG/M2 | HEIGHT: 63 IN | HEART RATE: 83 BPM | RESPIRATION RATE: 19 BRPM | TEMPERATURE: 98.5 F

## 2021-11-12 DIAGNOSIS — J44.1 COPD EXACERBATION (H): ICD-10-CM

## 2021-11-12 DIAGNOSIS — L73.2 HIDRADENITIS SUPPURATIVA: Primary | ICD-10-CM

## 2021-11-12 DIAGNOSIS — R07.9 CHEST PAIN, UNSPECIFIED TYPE: ICD-10-CM

## 2021-11-12 DIAGNOSIS — J18.9 COMMUNITY ACQUIRED PNEUMONIA OF RIGHT LOWER LOBE OF LUNG: ICD-10-CM

## 2021-11-12 LAB
ANION GAP SERPL CALCULATED.3IONS-SCNC: 7 MMOL/L (ref 3–14)
ATRIAL RATE - MUSE: 88 BPM
BASOPHILS # BLD MANUAL: 0 10E3/UL (ref 0–0.2)
BASOPHILS NFR BLD MANUAL: 0 %
BUN SERPL-MCNC: 23 MG/DL (ref 7–30)
CALCIUM SERPL-MCNC: 8.8 MG/DL (ref 8.5–10.1)
CHLORIDE BLD-SCNC: 103 MMOL/L (ref 94–109)
CO2 SERPL-SCNC: 28 MMOL/L (ref 20–32)
CREAT SERPL-MCNC: 1.03 MG/DL (ref 0.52–1.04)
D DIMER PPP FEU-MCNC: <0.27 UG/ML FEU (ref 0–0.5)
DIASTOLIC BLOOD PRESSURE - MUSE: NORMAL MMHG
EOSINOPHIL # BLD MANUAL: 0 10E3/UL (ref 0–0.7)
EOSINOPHIL NFR BLD MANUAL: 0 %
ERYTHROCYTE [DISTWIDTH] IN BLOOD BY AUTOMATED COUNT: 17 % (ref 10–15)
FLUAV RNA SPEC QL NAA+PROBE: NEGATIVE
FLUBV RNA RESP QL NAA+PROBE: NEGATIVE
GFR SERPL CREATININE-BSD FRML MDRD: 63 ML/MIN/1.73M2
GLUCOSE BLD-MCNC: 87 MG/DL (ref 70–99)
HCG SERPL QL: NEGATIVE
HCO3 BLDV-SCNC: 31 MMOL/L (ref 21–28)
HCT VFR BLD AUTO: 44.4 % (ref 35–47)
HGB BLD-MCNC: 13.6 G/DL (ref 11.7–15.7)
INTERPRETATION ECG - MUSE: NORMAL
LACTATE BLD-SCNC: 1.8 MMOL/L
LYMPHOCYTES # BLD MANUAL: 5.4 10E3/UL (ref 0.8–5.3)
LYMPHOCYTES NFR BLD MANUAL: 43 %
MCH RBC QN AUTO: 26.7 PG (ref 26.5–33)
MCHC RBC AUTO-ENTMCNC: 30.6 G/DL (ref 31.5–36.5)
MCV RBC AUTO: 87 FL (ref 78–100)
MONOCYTES # BLD MANUAL: 0.5 10E3/UL (ref 0–1.3)
MONOCYTES NFR BLD MANUAL: 4 %
NEUTROPHILS # BLD MANUAL: 6.6 10E3/UL (ref 1.6–8.3)
NEUTROPHILS NFR BLD MANUAL: 53 %
P AXIS - MUSE: 59 DEGREES
PATH REV: ABNORMAL
PCO2 BLDV: 52 MM HG (ref 40–50)
PH BLDV: 7.38 [PH] (ref 7.32–7.43)
PLAT MORPH BLD: ABNORMAL
PLATELET # BLD AUTO: 398 10E3/UL (ref 150–450)
PO2 BLDV: 34 MM HG (ref 25–47)
POTASSIUM BLD-SCNC: 4.7 MMOL/L (ref 3.4–5.3)
PR INTERVAL - MUSE: 124 MS
QRS DURATION - MUSE: 80 MS
QT - MUSE: 370 MS
QTC - MUSE: 447 MS
R AXIS - MUSE: 34 DEGREES
RBC # BLD AUTO: 5.1 10E6/UL (ref 3.8–5.2)
RBC MORPH BLD: ABNORMAL
SAO2 % BLDV: 63 % (ref 94–100)
SARS-COV-2 RNA RESP QL NAA+PROBE: NEGATIVE
SODIUM SERPL-SCNC: 138 MMOL/L (ref 133–144)
SYSTOLIC BLOOD PRESSURE - MUSE: NORMAL MMHG
T AXIS - MUSE: 52 DEGREES
TROPONIN I SERPL-MCNC: <0.015 UG/L (ref 0–0.04)
VARIANT LYMPHS BLD QL SMEAR: PRESENT
VENTRICULAR RATE- MUSE: 88 BPM
WBC # BLD AUTO: 12.5 10E3/UL (ref 4–11)

## 2021-11-12 PROCEDURE — 85027 COMPLETE CBC AUTOMATED: CPT | Performed by: EMERGENCY MEDICINE

## 2021-11-12 PROCEDURE — 93005 ELECTROCARDIOGRAM TRACING: CPT

## 2021-11-12 PROCEDURE — 250N000013 HC RX MED GY IP 250 OP 250 PS 637: Performed by: EMERGENCY MEDICINE

## 2021-11-12 PROCEDURE — C9803 HOPD COVID-19 SPEC COLLECT: HCPCS

## 2021-11-12 PROCEDURE — 250N000009 HC RX 250: Performed by: EMERGENCY MEDICINE

## 2021-11-12 PROCEDURE — 96368 THER/DIAG CONCURRENT INF: CPT

## 2021-11-12 PROCEDURE — 87636 SARSCOV2 & INF A&B AMP PRB: CPT | Performed by: EMERGENCY MEDICINE

## 2021-11-12 PROCEDURE — 96375 TX/PRO/DX INJ NEW DRUG ADDON: CPT

## 2021-11-12 PROCEDURE — 94640 AIRWAY INHALATION TREATMENT: CPT

## 2021-11-12 PROCEDURE — 36415 COLL VENOUS BLD VENIPUNCTURE: CPT | Performed by: EMERGENCY MEDICINE

## 2021-11-12 PROCEDURE — 82803 BLOOD GASES ANY COMBINATION: CPT

## 2021-11-12 PROCEDURE — 96365 THER/PROPH/DIAG IV INF INIT: CPT

## 2021-11-12 PROCEDURE — 87040 BLOOD CULTURE FOR BACTERIA: CPT | Mod: XS | Performed by: EMERGENCY MEDICINE

## 2021-11-12 PROCEDURE — 71045 X-RAY EXAM CHEST 1 VIEW: CPT

## 2021-11-12 PROCEDURE — 84703 CHORIONIC GONADOTROPIN ASSAY: CPT | Performed by: EMERGENCY MEDICINE

## 2021-11-12 PROCEDURE — 99285 EMERGENCY DEPT VISIT HI MDM: CPT | Mod: 25

## 2021-11-12 PROCEDURE — 80048 BASIC METABOLIC PNL TOTAL CA: CPT | Performed by: EMERGENCY MEDICINE

## 2021-11-12 PROCEDURE — 84484 ASSAY OF TROPONIN QUANT: CPT | Performed by: EMERGENCY MEDICINE

## 2021-11-12 PROCEDURE — 250N000011 HC RX IP 250 OP 636: Performed by: EMERGENCY MEDICINE

## 2021-11-12 PROCEDURE — 85379 FIBRIN DEGRADATION QUANT: CPT | Performed by: EMERGENCY MEDICINE

## 2021-11-12 RX ORDER — DOXYCYCLINE 100 MG/1
100 CAPSULE ORAL 2 TIMES DAILY
Qty: 20 CAPSULE | Refills: 0 | Status: SHIPPED | OUTPATIENT
Start: 2021-11-12 | End: 2021-11-22

## 2021-11-12 RX ORDER — PREDNISONE 20 MG/1
TABLET ORAL
Qty: 10 TABLET | Refills: 0 | Status: SHIPPED | OUTPATIENT
Start: 2021-11-12 | End: 2021-11-30

## 2021-11-12 RX ORDER — CEFTRIAXONE 2 G/1
2 INJECTION, POWDER, FOR SOLUTION INTRAMUSCULAR; INTRAVENOUS ONCE
Status: COMPLETED | OUTPATIENT
Start: 2021-11-12 | End: 2021-11-12

## 2021-11-12 RX ORDER — AMOXICILLIN AND CLAVULANATE POTASSIUM 562.5; 437.5; 62.5 MG/1; MG/1; MG/1
2 TABLET, MULTILAYER, EXTENDED RELEASE ORAL 2 TIMES DAILY
Qty: 40 TABLET | Refills: 0 | Status: SHIPPED | OUTPATIENT
Start: 2021-11-12 | End: 2021-11-22

## 2021-11-12 RX ORDER — IPRATROPIUM BROMIDE AND ALBUTEROL SULFATE 2.5; .5 MG/3ML; MG/3ML
6 SOLUTION RESPIRATORY (INHALATION) ONCE
Status: COMPLETED | OUTPATIENT
Start: 2021-11-12 | End: 2021-11-12

## 2021-11-12 RX ORDER — METHYLPREDNISOLONE SODIUM SUCCINATE 125 MG/2ML
125 INJECTION, POWDER, LYOPHILIZED, FOR SOLUTION INTRAMUSCULAR; INTRAVENOUS ONCE
Status: COMPLETED | OUTPATIENT
Start: 2021-11-12 | End: 2021-11-12

## 2021-11-12 RX ORDER — DOXYCYCLINE 100 MG/10ML
100 INJECTION, POWDER, LYOPHILIZED, FOR SOLUTION INTRAVENOUS ONCE
Status: COMPLETED | OUTPATIENT
Start: 2021-11-12 | End: 2021-11-12

## 2021-11-12 RX ORDER — ONDANSETRON 2 MG/ML
4 INJECTION INTRAMUSCULAR; INTRAVENOUS ONCE
Status: COMPLETED | OUTPATIENT
Start: 2021-11-12 | End: 2021-11-12

## 2021-11-12 RX ORDER — OXYCODONE HYDROCHLORIDE 5 MG/1
5 TABLET ORAL ONCE
Status: COMPLETED | OUTPATIENT
Start: 2021-11-12 | End: 2021-11-12

## 2021-11-12 RX ORDER — KETOROLAC TROMETHAMINE 15 MG/ML
15 INJECTION, SOLUTION INTRAMUSCULAR; INTRAVENOUS ONCE
Status: COMPLETED | OUTPATIENT
Start: 2021-11-12 | End: 2021-11-12

## 2021-11-12 RX ADMIN — OXYCODONE HYDROCHLORIDE 5 MG: 5 TABLET ORAL at 15:25

## 2021-11-12 RX ADMIN — DOXYCYCLINE 100 MG: 100 INJECTION, POWDER, LYOPHILIZED, FOR SOLUTION INTRAVENOUS at 15:26

## 2021-11-12 RX ADMIN — METHYLPREDNISOLONE SODIUM SUCCINATE 125 MG: 125 INJECTION, POWDER, FOR SOLUTION INTRAMUSCULAR; INTRAVENOUS at 12:04

## 2021-11-12 RX ADMIN — IPRATROPIUM BROMIDE AND ALBUTEROL SULFATE 6 ML: .5; 3 SOLUTION RESPIRATORY (INHALATION) at 12:04

## 2021-11-12 RX ADMIN — CEFTRIAXONE 2 G: 2 INJECTION, POWDER, FOR SOLUTION INTRAMUSCULAR; INTRAVENOUS at 15:27

## 2021-11-12 RX ADMIN — KETOROLAC TROMETHAMINE 15 MG: 15 INJECTION, SOLUTION INTRAMUSCULAR; INTRAVENOUS at 12:44

## 2021-11-12 RX ADMIN — ONDANSETRON 4 MG: 2 INJECTION INTRAMUSCULAR; INTRAVENOUS at 12:05

## 2021-11-12 ASSESSMENT — MIFFLIN-ST. JEOR: SCORE: 1672.3

## 2021-11-12 ASSESSMENT — ENCOUNTER SYMPTOMS
VOMITING: 0
SHORTNESS OF BREATH: 1
FEVER: 1
COUGH: 1

## 2021-11-12 NOTE — DISCHARGE INSTRUCTIONS
Discharge Instructions  Bronchitis, Pneumonia, Bronchospasm    You were seen today for a chest infection or inflammation. If your provider decided this was due to a bacterial infection, you may need an antibiotic. Sometimes these are caused by a virus, and then an antibiotic will not help.     Generally, every Emergency Department visit should have a follow-up clinic visit with either a primary or a specialty clinic/provider. Please follow-up as instructed by your emergency provider today.    Return to the Emergency Department if:  Your breathing gets much worse.  You are very weak, or feel much more ill.  You develop new symptoms, such as chest pain.  You cough up blood.  You are vomiting (throwing up) enough that you cannot keep fluids or your medicine down.    What can I do to help myself?  Fill any prescriptions the provider gave you and take them right away--especially antibiotics. Be sure to finish the whole antibiotic prescription.  You may be given a prescription for an inhaler, which can help loosen tight air passages.  Use this as needed, but not more often than directed. Inhalers work much better when used with a spacer.   You may be given a prescription for a steroid to reduce inflammation. Used long-term, these can have side effects, but for short-term use they are safe. You may notice restlessness or increased appetite.      You may use non-prescription cough or cold medicines. Cough medicines may help, but don t make the cough go away completely.   Avoid smoke, because this can make your symptoms worse. If you smoke, this may be a good time to quit! Consider using nicotine lozenges, gum, or patches to reduce cravings.   If you have a fever, Tylenol  (acetaminophen), Motrin  (ibuprofen), or Advil  (ibuprofen) may help bring fever down and may help you feel more comfortable. Be sure to read and follow the package directions, and ask your provider if you have questions.  Be sure to get your flu shot each  year.  For certain ages, the pneumonia shot can help prevent pneumonia.  If you were given a prescription for medicine here today, be sure to read all of the information (including the package insert) that comes with your prescription.  This will include important information about the medicine, its side effects, and any warnings that you need to know about.  The pharmacist who fills the prescription can provide more information and answer questions you may have about the medicine.  If you have questions or concerns that the pharmacist cannot address, please call or return to the Emergency Department.     Remember that you can always come back to the Emergency Department if you are not able to see your regular provider in the amount of time listed above, if you get any new symptoms, or if there is anything that worries you.

## 2021-11-12 NOTE — TELEPHONE ENCOUNTER
[facility] Call Center    Phone Message    May a detailed message be left on voicemail: yes     Reason for Call: Medication Refill Request    Has the patient contacted the pharmacy for the refill? Yes   Name of medication being requested: sulfamethoxazole-trimethoprim (BACTRIM DS) 800-160 MG tablet  Provider who prescribed the medication: Dr. Jordan  Pharmacy[place] [id number] [place] [address]  Date medication is needed: ASAP   Pt is having a break out and will need Rx. Pt is scheduled with Ashley Antonio 12/02 and wait listed. Please call Pt to discuss. Thanks      Action Taken: Message routed to:  [facility] Derm    Travel Screening: Not Applicable

## 2021-11-12 NOTE — ED PROVIDER NOTES
History   Chief Complaint:  Fever and Shortness of Breath       HPI   Swetha Patterson is a 51 year old female with history of COPD who presents with shortness of breath.  Patient had an excision of a lipoma 9 days prior on her shoulder.  The next day she developed symptoms of a productive cough and progressively worsening shortness of breath.  Dyspnea is increased by lying flat and exertion.  She notes a temperature up to 100.1 at home.  Along with the dyspnea, she developed diffuse anterior chest comfort 2 days prior.  The pain is constant and without alleviating or aggravating factors.  She has been using albuterol at home with mild improvement of symptoms but improvement is short-lived.  She was prescribed a 5-day course of azithromycin without improvement.  She had a negative COVID swab 4 days prior to arrival.  She presents to the ED today for worsening symptoms particularly dyspnea..    Review of Systems   Constitutional: Positive for fever.   Respiratory: Positive for cough and shortness of breath.    Cardiovascular: Positive for chest pain.   Gastrointestinal: Negative for vomiting.   Skin: Negative for rash.   All other systems reviewed and are negative.    Allergies:  Codeine  Cyclobenzaprine  Gabapentin  Hydrocodone  Sulfa Drugs     Medications:  Albuterol nebulizer  Albuterol inhaler  Lipitor  Tessalon   Symbicort inhaler  Wellbutrin XL   Cetirizine  Klonopin  Cymbalta  Trelegy ellipta inhaler  Lasix  Glipizide  Atarax  Lamictal  Lisinopril  Metformin  Singulair  Nitroglycerin  Omeprazole  Zofran  Oxycodone  Miralax  Compazine  Bactrim DS   Ventolin inhaler     Past Medical History:     Anxiety  Asthma  Chronic pain  COPD  Depression  Diabetes  Emphysema with chronic bronchitis  Esophageal reflux  Fibromyalgia  GERD   Heart disease   Hypertension  Hyperlipidemia   Liver disease   DIAZ       Past Surgical History:    Left shoulder arthroscopy decompression  Biopsy artery temporal  Bronchial  thermoplasty  Appendectomy  Colonoscopy   Discectomy fusion cervical anterior one level  ENT surgery  EGD, combined  Excise mass trunk   Excise node mediastinal  Laparoscopic cholecystectomy  Thoracoscopy  Tonsillectomy     Family History:    Heart disease - Mother   Hypertension - Mother and sister   CVA - Mother and sister   Depression - Mother   Asthma - Mother   Diabetes - Sister     Social History:  Presents to the ED alone via EMS    Physical Exam     Patient Vitals for the past 24 hrs:   BP Temp Temp src Pulse Resp SpO2   11/12/21 1200 134/83 -- -- 93 -- 93 %   11/12/21 1150 (!) 146/86 98.5  F (36.9  C) Oral 93 21 94 %       Physical Exam      Eyes:    Conjunctiva normal  Neck:    Supple, no meningismus.     CV:     Regular rate and rhythm.      No murmurs, rubs or gallops.       No unilateral leg swelling.       2+ radial pulses bilateral.       No lower extremity edema.  PULM:    Mild diffuse expiratory wheezing       No respiratory distress although tachypneic      Diminished air exchange throughout.     No rales.     No stridor.  ABD:    Soft, non-tender, non-distended.       No pulsatile masses.       No rebound, guarding or rigidity.  MSK:     No gross deformity to all four extremities.   LYMPH:   No cervical lymphadenopathy.  NEURO:   Alert. Good muscle tone, no atrophy.  Skin:    Warm, dry and intact.    Psych:    Anxious.        Emergency Department Course   ECG  ECG obtained at 11:54:55, ECG read at 1155  Normal sinus rhythm, Normal ECG    No significant change as compared to prior, dated 10/25/21.  Rate 88 bpm. DC interval 124 ms. QRS duration 80 ms. QT/QTc 370/447 ms. P-R-T axes 59 34 52.     Imaging:  XR Chest Port 1 View   Final Result   IMPRESSION: Infiltrate at the right lung base medially is suspicious   for pneumonia. The lungs are otherwise clear. No pneumothorax.   Pulmonary vascularity is within normal limits.      CHRISTINA ZENG MD            SYSTEM ID:  [id number]           Laboratory:  Labs Ordered and Resulted from Time of ED Arrival to Time of ED Departure   CBC WITH PLATELETS AND DIFFERENTIAL - Abnormal       Result Value    WBC Count 12.5 (*)     RBC Count 5.10      Hemoglobin 13.6      Hematocrit 44.4      MCV 87      MCH 26.7      MCHC 30.6 (*)     RDW 17.0 (*)     Platelet Count 398     ISTAT GASES LACTATE VENOUS POCT - Abnormal    Lactic Acid POCT 1.8      Bicarbonate Venous POCT 31 (*)     O2 Sat, Venous POCT 63 (*)     pCO2V Venous POCT 52 (*)     pH Venous POCT 7.38      pO2 Venous POCT 34     DIFFERENTIAL - Abnormal    % Neutrophils 53      % Lymphocytes 43      % Monocytes 4      % Eosinophils 0      % Basophils 0      Absolute Neutrophils 6.6      Absolute Lymphocytes 5.4 (*)     Absolute Monocytes 0.5      Absolute Eosinophils 0.0      Absolute Basophils 0.0      RBC Morphology Confirmed RBC Indices      Platelet Assessment   (*)     Value: Automated Count Confirmed. Giant platelets are present.    Reactive Lymphocytes Present (*)     Pathologist Review Comments       BASIC METABOLIC PANEL - Normal    Sodium 138      Potassium 4.7      Chloride 103      Carbon Dioxide (CO2) 28      Anion Gap 7      Urea Nitrogen 23      Creatinine 1.03      Calcium 8.8      Glucose 87      GFR Estimate 63     TROPONIN I - Normal    Troponin I <0.015     D DIMER QUANTITATIVE - Normal    D-Dimer Quantitative <0.27     INFLUENZA A/B & SARS-COV2 PCR MULTIPLEX - Normal    Influenza A target Negative      Influenza B target Negative      SARS CoV2 PCR Negative     HCG QUALITATIVE PREGNANCY - Normal    hCG Serum Qualitative Negative     BLOOD CULTURE   BLOOD CULTURE          Emergency Department Course:  Reviewed:  I reviewed nursing notes, vitals and past medical history    Assessments:   I obtained history and examined the patient as noted above.    I rechecked the patient and explained findings.     No residual wheezing, tachypnea.  Air exchange remarkably improved.  Patient feels  comfortable being discharged home.      Interventions:  Duoneb 6 mL  Solumedrol 125 mg IV  Toradol 30 mg IV  Oxycodone 5 mg PO  Ceftriaxone 2 grams IV  Doxycycline 100 mg IV    Disposition:  The patient was discharged to home.     Impression & Plan       Medical Decision Makin-year-old female presented to the ED with fever, shortness of breath and atypical chest pain.  She has a history of COPD and exacerbation was consistent with acute COPD exacerbation.  She was tachypneic but not distressed upon presentation.  Bronchodilators and steroids her aeration is much improved.  There is no residual wheezing or tachypnea on reexam.     X-ray revealed right lower lobe infiltrate consistent with community-acquired pneumonia.  Due to prior treat with azithromycin, patient was given ceftriaxone and doxycycline.  Patient feels much improved and would like to be discharged home.  Initial plan was for admission given her coinciding COPD.  After discussion of risk and benefits, she would like to be discharged home which is reasonable.  She will be discharged home on Augmentin and doxycycline.    She was evaluated for atypical ACS and PE.  EKG without ischemic changes.  Troponin within normal limits despite greater than 24 hours of constant pain ruling out MI.  D-dimer within normal limits.  No additional pathology noted.  Pain likely related to COPD exacerbation and pneumonia.    Diagnosis:    ICD-10-CM    1. Community acquired pneumonia of right lower lobe of lung  J18.9    2. COPD exacerbation (H)  J44.1        Discharge Medications:  New Prescriptions    AMOXICILLIN-CLAVULANATE (AUGMENTIN XR) 1000-62.5 MG 12 HR TABLET    Take 2 tablets by mouth 2 times daily for 10 days    DOXYCYCLINE HYCLATE (VIBRAMYCIN) 100 MG CAPSULE    Take 1 capsule (100 mg) by mouth 2 times daily for 10 days    PREDNISONE (DELTASONE) 20 MG TABLET    Take two tablets (= 40mg) each day for 5 (five) days       Scribe Disclosure:  Gume THRASHER,  am serving as a scribe at 12:14 PM on 11/12/2021 to document services personally performed by Valdez Sharp MD based on my observations and the provider's statements to me.         Valdez Sharp MD  11/12/21 1600

## 2021-11-12 NOTE — ED TRIAGE NOTES
Presents to ED with fever, chest pain with coughing, and SOB. Pt had shoulder surgery 11/3 and developed above symptoms on Friday. Pt reports fever 100.1 at home. Oxygen saturations WNL on room air. Pt has expiratory wheezing, and reports green sputum with coughing. Pt has hx iof COPD. EMS gave 1 duoneb, with minimal improvement.

## 2021-11-12 NOTE — TELEPHONE ENCOUNTER
S-(situation): since surgery on 11/3/2021 patient has been ill  Coughing, fever, sob. sats been reading lox 90s  bp one night after having dizzy symptoms was systolic 80s  Patient finished zpack on 11/8/2021 and covid test negative  Evaluated in UC  Patient feels her COPD is exacerbated and maybe has now turned to pneumonia    B-(background): copd history    A-(assessment): sats low 90s  Slight fever, chills  Finished z pack 4 days ago  Still no improvement    R-(recommendations): patient asking if she should go to ED or UC. Given her coughing, sob and history of COPD she will go to ED for evaluation.

## 2021-11-12 NOTE — TELEPHONE ENCOUNTER
Centralized Medication Refill Team note:      sulfamethoxazole-trimethoprim (BACTRIM DS) 800-160 MG tablet   Last Written Prescription Date:  4/30/21  Last Fill Quantity: 180,   # refills: 1  Last Office Visit :7/2/20 Nikki  Future Office visit:  12/2/21 Marisela    Routing refill request to provider for review/approval because:  Drug not on the [facility], [facility] or [facility] refill protocol. Last seen> 17 months ago. Has upcoming appt.  Per chart review, patient currently in [facility] ED  ( arrival 1148)  in ED [facility] for assessment of  fever/cough/ SOB/ chest pain.

## 2021-11-12 NOTE — ED NOTES
Bed: ED33  Expected date: 11/12/21  Expected time:   Means of arrival: Ambulance  Comments:  [id number]

## 2021-11-16 ENCOUNTER — TELEPHONE (OUTPATIENT)
Dept: INTERNAL MEDICINE | Facility: CLINIC | Age: 51
End: 2021-11-16
Payer: COMMERCIAL

## 2021-11-16 NOTE — TELEPHONE ENCOUNTER
Patient ws seen in ER 11/12/21, diagnosed with pneumonia, on 2 antibiotics and Prednisone.  Doing a lot of coughing (productive) and feels fatigued.  States she was told to see primary care provider in office rather than a virtual visit as scheduled Mon. 11/22/21.  Can that virtual visit be converted to an in clinic visit?  SERJIO Chandra R.N.

## 2021-11-17 ENCOUNTER — TELEPHONE (OUTPATIENT)
Dept: INTERNAL MEDICINE | Facility: CLINIC | Age: 51
End: 2021-11-17
Payer: COMMERCIAL

## 2021-11-17 DIAGNOSIS — J44.1 COPD EXACERBATION (H): Primary | ICD-10-CM

## 2021-11-17 LAB
BACTERIA BLD CULT: NO GROWTH
BACTERIA BLD CULT: NO GROWTH

## 2021-11-17 NOTE — TELEPHONE ENCOUNTER
Patient is scheduled with Ashley Antonio in December per Dr. Jordan    Please advise, thank you.    Aretha Zapata  Dermatology Clinic Coordinator

## 2021-11-18 DIAGNOSIS — E78.5 HYPERLIPIDEMIA LDL GOAL <130: Chronic | ICD-10-CM

## 2021-11-18 DIAGNOSIS — J44.1 COPD EXACERBATION (H): Primary | ICD-10-CM

## 2021-11-18 RX ORDER — SULFAMETHOXAZOLE/TRIMETHOPRIM 800-160 MG
1 TABLET ORAL ONCE
Status: CANCELLED | OUTPATIENT
Start: 2021-11-18 | End: 2021-11-18

## 2021-11-18 NOTE — TELEPHONE ENCOUNTER
Left message for patient to return call to clinic.  She probably won't get order for humidifier today but we will see what we can do for her.  In the mean time she can use steam/shower.  Patient given this message by TC, she is asking if another provider can write the order for a humidifier.  SERJIO Chandra R.N.

## 2021-11-18 NOTE — TELEPHONE ENCOUNTER
Patient called again to check on the status of the order she needs to get the humidifier    She is hoping to get this today

## 2021-11-18 NOTE — TELEPHONE ENCOUNTER
[facility] Call Center    Phone Message    May a detailed message be left on voicemail: yes     Reason for Call: Medication Question or concern regarding medication   Prescription Clarification  Name of Medication: Bactrim  Prescribing Provider: Dr. Jordan   [facility] [place], [place]   What on the order needs clarification?     Pt is calling back stating she is out of the Bactrim, pt would like to know why it is taking so long to get this filled?     Pt does have an appt with Eileen Farahene in January, she couldn't get in any sooner she said    Pt is asking for a call back today to discuss this          Action Taken: Message routed to:  [facility] Derm    Travel Screening: Not Applicable

## 2021-11-18 NOTE — TELEPHONE ENCOUNTER
Pt has follow up with Ashley Antonio PA-C 12/2. Will route to SUSANNA for consideration. Pt last prescribed 4/30/21. Script asks pt to have follow up appt scheduled for further refills.    See Note 9/6456 if needed

## 2021-11-18 NOTE — TELEPHONE ENCOUNTER
Received refill request as SUSANNA for Bactrim. Patient not seen in person since 7/2/2020, missed last appt on 4/2021. List of current medications includes Augmentin and Doxycycline. Has appt in less than 2 weeks with Ashley Antonio. Called patient, no response. Will decline refill until patient is seen in clinic. Attending Dr. Jordan CC'd as MARICEL.     Aurelia Puckett MD ([id number])  Dermatology Resident

## 2021-11-19 ENCOUNTER — VIRTUAL VISIT (OUTPATIENT)
Dept: DERMATOLOGY | Facility: CLINIC | Age: 51
End: 2021-11-19
Payer: COMMERCIAL

## 2021-11-19 ENCOUNTER — TELEPHONE (OUTPATIENT)
Dept: INTERNAL MEDICINE | Facility: CLINIC | Age: 51
End: 2021-11-19

## 2021-11-19 DIAGNOSIS — L73.2 HIDRADENITIS SUPPURATIVA: ICD-10-CM

## 2021-11-19 DIAGNOSIS — L65.9 LOSS OF HAIR: Primary | ICD-10-CM

## 2021-11-19 PROCEDURE — 99214 OFFICE O/P EST MOD 30 MIN: CPT | Mod: 95 | Performed by: PHYSICIAN ASSISTANT

## 2021-11-19 RX ORDER — MINOXIDIL 50 MG/G
1 AEROSOL, FOAM TOPICAL 2 TIMES DAILY
Qty: 60 G | Refills: 11 | Status: SHIPPED | OUTPATIENT
Start: 2021-11-19 | End: 2021-11-30

## 2021-11-19 RX ORDER — SULFAMETHOXAZOLE/TRIMETHOPRIM 800-160 MG
1 TABLET ORAL 2 TIMES DAILY
Qty: 180 TABLET | Refills: 1 | Status: SHIPPED | OUTPATIENT
Start: 2021-11-19 | End: 2022-02-02

## 2021-11-19 RX ORDER — SULFAMETHOXAZOLE/TRIMETHOPRIM 800-160 MG
1 TABLET ORAL 2 TIMES DAILY
Qty: 180 TABLET | Refills: 1 | Status: SHIPPED | OUTPATIENT
Start: 2021-11-19 | End: 2021-11-19

## 2021-11-19 ASSESSMENT — PAIN SCALES - GENERAL: PAINLEVEL: NO PAIN (0)

## 2021-11-19 NOTE — LETTER
11/19/2021       RE: Swetha Patterson  [id number]  [place] [address]     Dear Colleague,    Thank you for referring your patient, Swetha Patterson, to the [facility] [place] at [facility]. Please see a copy of my visit note below.    [facility] Dermatology Note  Encounter Date: Nov 19, 2021  [employer]-and[employer] and Telephone ([phone] ). Location of teledermatologist: [facility] [place]. Length of call: 15min    Dermatology Problem List:  1.Hidradenitis suppurativa, Parra stage II  -Current tx: TMP--160 mg BID, BPO wash  -Consider derm surg referral/excision at follow-up  -Prior tx: doxycycline 100 mg BID for many years, clindamycin gave her diarrhea  2. Hair loss, suspect androgenetic  -minoxidil 5% foam BID  ____________________________________________    Assessment & Plan:     # HSPrincess stage II - currently well controlled.   -continue Bactrim -160mg BID, refilled today for 6mo - tolerating well and it has drastically cut back on her flares  -continue clindamycin gel topically on abdomen as well as BPO washes    #Hair Loss - suspect androgenetic based on patient's history  -start minoxidil 5% foam once of ideally twice daily to the scalp  -will do hair exam at next visit as well as take photographs  -edu that it may take 6mo for minoxidil to have noticeable effect    #Skin tags - unable to review photos/assess, will consider removal when patient comes in for an in person office visit    Procedures Performed:    None    Follow-up: 6 month(s) in-person, or earlier for new or changing lesions    Staff:     All risks, benefits and alternatives were discussed with patient.  Patient is in agreement and understands the assessment and plan.  All questions were answered.    Ashley Antonio PA-C, MPAS  [facility]: Phone: [phone], Fax: [phone]  [facility]: Phone: [phone],  Fax: [phone]  ____________________________________________    CC: Derm Problem (Hidradenitis suppurativa - Swetha has been stable )    HPI:  Ms. Swetha Patterson is a(n) 51 year old female who presents today as a return patient for HS follow up.     No flares since starting Bactrim. Works well for her. Gets ourbreask on bttocks, legs and stomach. Still uses BPO gel and washes. Clindamycin 1% gel also for abdomen.     Currently has Pneumonia. On 3 abx, amox, pcn, doxy    Notes her hair is very thin, cant wear hair down. Started noticing hair loss about 1.5 years ago. No other women I family with hair loss. Post memopausal in 2013. Mostly noticing hairloss on the front of her scalp which move to the back of the scalp. Notes she can see her scalp when she gets out of the showers. Washes hair every 2-3 days. Uses herbal essences shampoo and panteen. Does get some scabs on the scalp from time to time, thins she has had folliculitis at times. Some mild dryness. Infrequent dandruff.     Patient is otherwise feeling well, without additional skin concerns.    Labs Reviewed:  N/A    Physical Exam:  Vitals: LMP 04/15/2016 (Exact Date)   SKIN: Teledermatology photos were reviewed; image quality and interpretability: acceptable. Image date:11/19/21.  - abdomen - few hyperpigmented scars, no active papules or pustules  - No other lesions of concern on areas examined.     Medications:  Current Outpatient Medications   Medication     albuterol (PROVENTIL) (2.5 MG/3ML) 0.083% neb solution     albuterol (VENTOLIN HFA) 108 (90 Base) MCG/ACT inhaler     amoxicillin-clavulanate (AUGMENTIN XR) 1000-62.5 MG 12 hr tablet     atorvastatin (LIPITOR) 80 MG tablet     benzonatate (TESSALON) 200 MG capsule     benzoyl peroxide (ACNE-CLEAR) 10 % external gel     blood glucose (ACCU-CHEK ALLYSON PLUS) test strip     blood glucose (NO BRAND  SPECIFIED) lancets standard     blood glucose (NO BRAND SPECIFIED) lancets standard     blood glucose (NO BRAND SPECIFIED) test strip     blood glucose monitoring (NO BRAND SPECIFIED) meter device kit     blood glucose monitoring (NO BRAND SPECIFIED) meter device kit     budesonide-formoterol (SYMBICORT) 160-4.5 MCG/ACT Inhaler     buPROPion (WELLBUTRIN XL) 150 MG 24 hr tablet     cetirizine HCl 10 MG CAPS     clindamycin 1 % EX external gel     clonazePAM (KLONOPIN) 1 MG tablet     doxycycline hyclate (VIBRAMYCIN) 100 MG capsule     DULoxetine (CYMBALTA) 20 MG capsule     fluticasone (FLONASE) 50 MCG/ACT nasal spray     Fluticasone-Umeclidin-Vilanterol (TRELEGY ELLIPTA) 100-62.5-25 MCG/INH oral inhaler     furosemide (LASIX) 20 MG tablet     glipiZIDE (GLUCOTROL XL) 2.5 MG 24 hr tablet     hydrocortisone 2.5 % cream     hydrOXYzine (ATARAX) 50 MG tablet     lamoTRIgine (LAMICTAL) 150 MG tablet     lisinopril (ZESTRIL) 20 MG tablet     metFORMIN (GLUCOPHAGE) 500 MG tablet     montelukast (SINGULAIR) 10 MG tablet     nitroGLYcerin (NITROSTAT) 0.4 MG sublingual tablet     nystatin (MYCOSTATIN) 821054 UNIT/GM external powder     nystatin (MYCOSTATIN) 044884 UNIT/ML suspension     omeprazole (PRILOSEC) 40 MG DR capsule     ondansetron (ZOFRAN) 8 MG tablet     ondansetron (ZOFRAN-ODT) 4 MG ODT tab     oxyCODONE (ROXICODONE) 5 MG tablet     polyethylene glycol (MIRALAX) 17 GM/Dose powder     predniSONE (DELTASONE) 20 MG tablet     prochlorperazine (COMPAZINE) 10 MG tablet     sulfamethoxazole-trimethoprim (BACTRIM DS) 800-160 MG tablet     terbinafine (LAMISIL) 1 % external cream     VENTOLIN  (90 Base) MCG/ACT inhaler     No current facility-administered medications for this visit.      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)     DIAZ (nonalcoholic steatohepatitis)     Intentional drug overdose (H)     Status post cervical spinal fusion     Depression     Vocal cord polyp     HTN, goal below 140/90     COPD, moderate (H)     History of opioid abuse (H) Rx was stopped when she failed urine drug test     Claudication of both lower extremities (H)     PAD (peripheral artery disease) (H)     Jaw claudication     Morbid obesity with BMI of 40.0-44.9, adult (H)     Personal history of tobacco use, presenting hazards to health     Past Medical History:   Diagnosis Date     Anxiety state, unspecified      Asthma      Chronic pain     back pain from cyst     Contact dermatitis and other eczema, due to unspecified cause      COPD (chronic obstructive pulmonary disease) (H)      Depressive disorder, not elsewhere classified      Diabetes (H)      Emphysema with chronic bronchitis (H)      Esophageal reflux      Family history of ischemic heart disease      Fibromyalgia      Gastro-oesophageal reflux disease      Heart disease     has chest pain sometimes     History of emphysema      Hoarseness      HTN, goal below 140/90      Hyperlipidemia LDL goal <130      Liver disease     \"fatty liver\"     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)      PONV (postoperative nausea and vomiting)      Rectal bleeding        CC Dr. Jordan on close of this encounter.      Again, thank you for allowing me to participate in the care of your patient.      Sincerely,    Ashley Antonio PA-C      "

## 2021-11-19 NOTE — TELEPHONE ENCOUNTER
Routing refill request to provider for review/approval because:  Drug not on the [facility] refill protocol   Labs not current:      Pending Prescriptions:                       Disp   Refills    atorvastatin (LIPITOR) 80 MG tablet [Pharm*90 tab*0        Sig: TAKE ONE TABLET BY MOUTH EVERY DAY    SM MUCUS RELIEF 600 MG 12 hr tablet [Pharm*30 tab*0        Sig: TAKE TWO TABLET BY MOUTH TWO TIMES DAILY    Next 5 appointments (look out 90 days)      Ranjit 10, 2022  3:20 PM  (Arrive by 3:05 PM)  Return Visit with Eileen Pak PA-C  [facility] ([facility] ) [address] [address] [phone]

## 2021-11-19 NOTE — PATIENT INSTRUCTIONS
[facility] Dermatology Visit    Thank you for allowing us to participate in your care. Your findings, instructions and follow-up plan are as follows:         When should I call my doctor?    If you are worsening or not improving, please, contact us or seek urgent care as noted below.     Who should I call with questions (adults)?    [facility] (adult and pediatric): [phone]    [facility] (adult): [phone]    For urgent needs outside of business hours call the [facility] at [phone] and ask for the dermatology resident on call    If this is a medical emergency and you are unable to reach an ER, Call 911    Who should I call with questions (pediatric)?  [facility]- Pediatric Dermatology  Dr. Radha Frank, Dr. Wilmer Pierre, Dr. Margret Jose, Linda Pitt, PA  Dr. Rimma Richards, Dr. Bonnie Lamar & Dr. Rey Glasgow  Non Urgent  Nurse Triage Line; [phone]- Radha and Viktoriya EARLY Care Coordinators   Melissa (/Complex ) [phone]    If you need a prescription refill, please contact your pharmacy. Refills are approved or denied by our physicians during normal business hours, Monday through Fridays  Per office policy, refills will not be granted if you have not been seen within the past year (or sooner depending on your child's condition).    Scheduling Information:  Pediatric Appointment Scheduling and Call Center [phone]  Radiology Scheduling- [phone]  Sedation Unit Scheduling- [phone]  [place] Scheduling- General [phone]; Pediatric Dermatology [phone]  Main  Services: [phone]  Danish: [phone]  Iraqi: [phone]  Hmong/Swedish/Urdu: [phone]  Preadmission Nursing Department Fax Number: [phone] (fax all pre-operative paperwork to this number)    For urgent matters arising during evenings,  weekends, or holidays that cannot wait for normal business hours please call [phone] and ask for the dermatology resident on call to be paged.

## 2021-11-19 NOTE — PROGRESS NOTES
[facility] Dermatology Note  Encounter Date: Nov 19, 2021  Store-and-Forward and Telephone ([phone] ). Location of teledermatologist: [facility] [place]. Length of call: 15min    Dermatology Problem List:  1.Hidradenitis suppurativa, Fidel stage II  -Current tx: TMP--160 mg BID, BPO wash  -Consider derm surg referral/excision at follow-up  -Prior tx: doxycycline 100 mg BID for many years, clindamycin gave her diarrhea  2. Hair loss, suspect androgenetic  -minoxidil 5% foam BID  ____________________________________________    Assessment & Plan:     # HSPrincess stage II - currently well controlled.   -continue Bactrim -160mg BID, refilled today for 6mo - tolerating well and it has drastically cut back on her flares  -continue clindamycin gel topically on abdomen as well as BPO washes    #Hair Loss - suspect androgenetic based on patient's history  -start minoxidil 5% foam once of ideally twice daily to the scalp  -will do hair exam at next visit as well as take photographs  -edu that it may take 6mo for minoxidil to have noticeable effect    #Skin tags - unable to review photos/assess, will consider removal when patient comes in for an in person office visit    Procedures Performed:    None    Follow-up: 6 month(s) in-person, or earlier for new or changing lesions    Staff:     All risks, benefits and alternatives were discussed with patient.  Patient is in agreement and understands the assessment and plan.  All questions were answered.    Ashley Antonio PA-C, MPAS  [facility] [place]: Phone: [phone], Fax: [phone]  [facility]: Phone: [phone],  Fax: [phone]  ____________________________________________    CC: Derm Problem (Hidradenitis suppurativa - Swetha has been stable )    HPI:  Ms. Swetha Patterson is a(n) 51 year old female who presents  today as a return patient for HS follow up.     No flares since starting Bactrim. Works well for her. Gets ourbreask on bttocks, legs and stomach. Still uses BPO gel and washes. Clindamycin 1% gel also for abdomen.     Currently has Pneumonia. On 3 abx, amox, pcn, doxy    Notes her hair is very thin, cant wear hair down. Started noticing hair loss about 1.5 years ago. No other women I family with hair loss. Post memopausal in 2013. Mostly noticing hairloss on the front of her scalp which move to the back of the scalp. Notes she can see her scalp when she gets out of the showers. Washes hair every 2-3 days. Uses herbal essences shampoo and panteen. Does get some scabs on the scalp from time to time, thins she has had folliculitis at times. Some mild dryness. Infrequent dandruff.     Patient is otherwise feeling well, without additional skin concerns.    Labs Reviewed:  N/A    Physical Exam:  Vitals: LMP 04/15/2016 (Exact Date)   SKIN: Teledermatology photos were reviewed; image quality and interpretability: acceptable. Image date:11/19/21.  - abdomen - few hyperpigmented scars, no active papules or pustules  - No other lesions of concern on areas examined.     Medications:  Current Outpatient Medications   Medication     albuterol (PROVENTIL) (2.5 MG/3ML) 0.083% neb solution     albuterol (VENTOLIN HFA) 108 (90 Base) MCG/ACT inhaler     amoxicillin-clavulanate (AUGMENTIN XR) 1000-62.5 MG 12 hr tablet     atorvastatin (LIPITOR) 80 MG tablet     benzonatate (TESSALON) 200 MG capsule     benzoyl peroxide (ACNE-CLEAR) 10 % external gel     blood glucose (ACCU-CHEK ALLYSON PLUS) test strip     blood glucose (NO BRAND SPECIFIED) lancets standard     blood glucose (NO BRAND SPECIFIED) lancets standard     blood glucose (NO BRAND SPECIFIED) test strip     blood glucose monitoring (NO BRAND SPECIFIED) meter device kit     blood glucose monitoring (NO BRAND SPECIFIED) meter device kit     budesonide-formoterol (SYMBICORT)  160-4.5 MCG/ACT Inhaler     buPROPion (WELLBUTRIN XL) 150 MG 24 hr tablet     cetirizine HCl 10 MG CAPS     clindamycin 1 % EX external gel     clonazePAM (KLONOPIN) 1 MG tablet     doxycycline hyclate (VIBRAMYCIN) 100 MG capsule     DULoxetine (CYMBALTA) 20 MG capsule     fluticasone (FLONASE) 50 MCG/ACT nasal spray     Fluticasone-Umeclidin-Vilanterol (TRELEGY ELLIPTA) 100-62.5-25 MCG/INH oral inhaler     furosemide (LASIX) 20 MG tablet     glipiZIDE (GLUCOTROL XL) 2.5 MG 24 hr tablet     hydrocortisone 2.5 % cream     hydrOXYzine (ATARAX) 50 MG tablet     lamoTRIgine (LAMICTAL) 150 MG tablet     lisinopril (ZESTRIL) 20 MG tablet     metFORMIN (GLUCOPHAGE) 500 MG tablet     montelukast (SINGULAIR) 10 MG tablet     nitroGLYcerin (NITROSTAT) 0.4 MG sublingual tablet     nystatin (MYCOSTATIN) 856495 UNIT/GM external powder     nystatin (MYCOSTATIN) 854847 UNIT/ML suspension     omeprazole (PRILOSEC) 40 MG DR capsule     ondansetron (ZOFRAN) 8 MG tablet     ondansetron (ZOFRAN-ODT) 4 MG ODT tab     oxyCODONE (ROXICODONE) 5 MG tablet     polyethylene glycol (MIRALAX) 17 GM/Dose powder     predniSONE (DELTASONE) 20 MG tablet     prochlorperazine (COMPAZINE) 10 MG tablet     sulfamethoxazole-trimethoprim (BACTRIM DS) 800-160 MG tablet     terbinafine (LAMISIL) 1 % external cream     VENTOLIN  (90 Base) MCG/ACT inhaler     No current facility-administered medications for this visit.      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)     DIAZ (nonalcoholic steatohepatitis)     Intentional drug overdose (H)     Status post cervical spinal fusion     Depression     Vocal cord polyp     HTN, goal below 140/90     COPD, moderate (H)     History of opioid abuse (H) Rx was stopped when  "she failed urine drug test     Claudication of both lower extremities (H)     PAD (peripheral artery disease) (H)     Jaw claudication     Morbid obesity with BMI of 40.0-44.9, adult (H)     Personal history of tobacco use, presenting hazards to health     Past Medical History:   Diagnosis Date     Anxiety state, unspecified      Asthma      Chronic pain     back pain from cyst     Contact dermatitis and other eczema, due to unspecified cause      COPD (chronic obstructive pulmonary disease) (H)      Depressive disorder, not elsewhere classified      Diabetes (H)      Emphysema with chronic bronchitis (H)      Esophageal reflux      Family history of ischemic heart disease      Fibromyalgia      Gastro-oesophageal reflux disease      Heart disease     has chest pain sometimes     History of emphysema      Hoarseness      HTN, goal below 140/90      Hyperlipidemia LDL goal <130      Liver disease     \"fatty liver\"     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)      PONV (postoperative nausea and vomiting)      Rectal bleeding        CC Dr. Jordan on close of this encounter.  "

## 2021-11-19 NOTE — NURSING NOTE
Dermatology Rooming Note    Swetha Patterson's goals for this visit include:   Chief Complaint   Patient presents with     Derm Problem     Hidradenitis suppurativa - Swetha has been stable      Jozef Aragon, [facility]

## 2021-11-21 ENCOUNTER — MEDICAL CORRESPONDENCE (OUTPATIENT)
Dept: HEALTH INFORMATION MANAGEMENT | Facility: CLINIC | Age: 51
End: 2021-11-21
Payer: COMMERCIAL

## 2021-11-22 ENCOUNTER — TELEPHONE (OUTPATIENT)
Dept: DERMATOLOGY | Facility: CLINIC | Age: 51
End: 2021-11-22

## 2021-11-22 ENCOUNTER — VIRTUAL VISIT (OUTPATIENT)
Dept: INTERNAL MEDICINE | Facility: CLINIC | Age: 51
End: 2021-11-22
Payer: COMMERCIAL

## 2021-11-22 DIAGNOSIS — J44.9 COPD, MODERATE (H): Primary | ICD-10-CM

## 2021-11-22 DIAGNOSIS — J18.9 PNEUMONIA OF RIGHT LOWER LOBE DUE TO INFECTIOUS ORGANISM: ICD-10-CM

## 2021-11-22 PROBLEM — K76.0 STEATOSIS OF LIVER: Status: ACTIVE | Noted: 2017-10-09

## 2021-11-22 PROBLEM — R19.8 CHANGE IN BOWEL MOVEMENT: Status: ACTIVE | Noted: 2021-11-22

## 2021-11-22 PROBLEM — R11.0 NAUSEA: Status: ACTIVE | Noted: 2021-11-22

## 2021-11-22 PROBLEM — K44.9 DIAPHRAGMATIC HERNIA: Status: ACTIVE | Noted: 2017-09-22

## 2021-11-22 PROBLEM — K62.9 ANORECTAL DISORDER: Status: ACTIVE | Noted: 2019-08-23

## 2021-11-22 PROBLEM — K62.89 RECTAL PAIN: Status: ACTIVE | Noted: 2021-11-22

## 2021-11-22 PROBLEM — R10.13 EPIGASTRIC PAIN: Status: ACTIVE | Noted: 2017-09-26

## 2021-11-22 PROBLEM — R19.8 DIGESTIVE SYMPTOM: Status: ACTIVE | Noted: 2018-09-18

## 2021-11-22 PROCEDURE — 99214 OFFICE O/P EST MOD 30 MIN: CPT | Mod: 95 | Performed by: INTERNAL MEDICINE

## 2021-11-22 RX ORDER — GUAIFENESIN 600 MG/1
TABLET ORAL
Qty: 30 TABLET | Refills: 0 | Status: SHIPPED | OUTPATIENT
Start: 2021-11-22 | End: 2022-02-16

## 2021-11-22 RX ORDER — ATORVASTATIN CALCIUM 80 MG/1
TABLET, FILM COATED ORAL
Qty: 90 TABLET | Refills: 0 | Status: SHIPPED | OUTPATIENT
Start: 2021-11-22 | End: 2022-02-25

## 2021-11-22 NOTE — TELEPHONE ENCOUNTER
Patient calling and wants this faxed to [facility] in [place] [address]. Please call her when this is done ok to call and  [phone]

## 2021-11-22 NOTE — PROGRESS NOTES
Swetha is a 51 year old who is being evaluated via a billable video visit.      How would you like to obtain your AVS? Mail a copy  If the video visit is dropped, the invitation should be resent by: Text to cell phone: [phone]  Will anyone else be joining your video visit? No            This is a VIDEO ( using Doximity)  encounter with the patient.       Location of the provider : office   Location of the patient : home      12:00 --- 12:25                Dr Low's note      Patient's instructions / PLAN:                                                        Plan:  1. Take the Augmentin 2 tablets every 12 hours   2. Take metformin 2 tablets twice a day -- if you do not tolerate go back to 1 tablet 1-2 times a day  3. Schedule video appointment next Tuerday  4. Continue with the nebulizer  5. You need to walk 5 min every 2 hours      ASSESSMENT & PLAN:                                                      (J44.9) COPD, moderate (H)  (primary encounter diagnosis)  (J18.9) Pneumonia of right lower lobe due to infectious organism  Comment: improving slowly  Plan: as above      No in office appointment available.     Chief complaint:                                                      F/u pneumonia      SUBJECTIVE:                                                    History of present illness:    ER visit  CXR Nov 12: RLL infiltrate   WBC 12    Dr Low prescribed Zpack on Nov 4. In ER Nov 12 she was given iv Rocephin and Doxy and she was discharged home on Augmentin and doxy    She states she was discharged on 3 abx: I have her read to me. She didn't realized that she had 2 bottles of Augmentin She hasn't read the bottle instructions   She has been taking only 1 gram q 12 h instead of 2 gram q 12h  The visiting nurse didn't noticed she has been taking 1 gram q 12 h  Uses the nebs freq     She feels just 40 % better.     Diabetes Follow-up    How often are you checking your blood sugar? One time daily  What  time of day are you checking your blood sugars (select all that apply)?  Before meals  Have you had any blood sugars above 200?  Yes   Have you had any blood sugars below 70?  No    What symptoms do you notice when your blood sugar is low?  None    What concerns do you have today about your diabetes? None     Do you have any of these symptoms? (Select all that apply)  Excessive thirst              Hyperlipidemia Follow-Up      Are you regularly taking any medication or supplement to lower your cholesterol?   Yes- atorvastatin    Are you having muscle aches or other side effects that you think could be caused by your cholesterol lowering medication?  Yes- muscle aches in legs    Hypertension Follow-up      Do you check your blood pressure regularly outside of the clinic? Yes     Are you following a low salt diet? Yes    Are your blood pressures ever more than 140 on the top number (systolic) OR more   than 90 on the bottom number (diastolic), for example 140/90? No    BP Readings from Last 2 Encounters:   11/12/21 122/84   11/03/21 (!) 162/98     Hemoglobin A1C (%)   Date Value   10/16/2020 5.6   02/20/2020 6.0 (H)     LDL Cholesterol Calculated (mg/dL)   Date Value   02/20/2020 96   05/10/2019 176 (H)         How many servings of fruits and vegetables do you eat daily?  2-3    On average, how many sweetened beverages do you drink each day (Examples: soda, juice, sweet tea, etc.  Do NOT count diet or artificially sweetened beverages)?   1    How many days per week do you exercise enough to make your heart beat faster? 3 or less    How many minutes a day do you exercise enough to make your heart beat faster? 9 or less    How many days per week do you miss taking your medication? 0        Review of Systems:                                                      ROS: negative for fever, chills,  wheezes, chest pain, shortness of breath, vomiting, abdominal pain, leg swelling Pos for cough, SOB      OBJECTIVE:           An  "actual physical exam can't be done during phone visit   A limited exam can sometimes be performed by video visit   NAD, mild cough       PMHx: reviewed  Past Medical History:   Diagnosis Date     Anxiety state, unspecified      Asthma      Chronic pain     back pain from cyst     Contact dermatitis and other eczema, due to unspecified cause      COPD (chronic obstructive pulmonary disease) (H)      Depressive disorder, not elsewhere classified      Diabetes (H)      Emphysema with chronic bronchitis (H)      Esophageal reflux      Family history of ischemic heart disease      Fibromyalgia      Gastro-oesophageal reflux disease      Heart disease     has chest pain sometimes     History of emphysema      Hoarseness      HTN, goal below 140/90      Hyperlipidemia LDL goal <130      Liver disease     \"fatty liver\"     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)      PONV (postoperative nausea and vomiting)      Rectal bleeding       PSHx: reviewed  Past Surgical History:   Procedure Laterality Date     ANESTHESIA OUT OF OR MRI N/A 10/7/2021    Procedure: ANESTHESIA OUT OF OR MRI;  Surgeon: GENERIC ANESTHESIA PROVIDER;  Location: [facility] OR     ARTHROSCOPY SHOULDER DECOMPRESSION Left 10/21/2015    Procedure: ARTHROSCOPY SHOULDER DECOMPRESSION;  Surgeon: Julien Milian MD;  Location: [facility] OR     BIOPSY ARTERY TEMPORAL Left 3/11/2020    Procedure: LEFT TEMPORAL ARTERY BIOPSY;  Surgeon: Ibeth Conner MD;  Location: [facility] OR     BRONCHIAL THERMOPLASTY N/A 11/14/2014    Procedure: BRONCHIAL THERMOPLASTY;  Surgeon: Ward Whitaker MD;  Location: [facility] GI     BRONCHIAL THERMOPLASTY N/A 12/19/2014    Procedure: BRONCHIAL THERMOPLASTY;  Surgeon: Ward Whitaker MD;  [facility]: [facility] OR     BRONCHIAL THERMOPLASTY N/A 2/6/2015    Procedure: BRONCHIAL THERMOPLASTY;  Surgeon: Ward Whitaker MD;  Location: [facility] OR     C APPENDECTOMY  at age 17     COLONOSCOPY N/A 11/26/2018    Procedure: COLONOSCOPY ([facility]);  Surgeon: " Marshall Oakes MD;  Location:  OR     COLONOSCOPY N/A 10/22/2020    Procedure: Colonoscopy;  Surgeon: Marshall Mccormick MD;  Location:  OR     DISCECTOMY, FUSION CERVICAL ANTERIOR ONE LEVEL, COMBINED N/A 5/1/2018    Procedure: COMBINED DISCECTOMY, FUSION CERVICAL ANTERIOR ONE LEVEL;  1.  C5-C6 anterior cervical diskectomy and fusion.    2.  C5-C6 application of intervertebral biomechanical device for interbody fusion purposes.    3.  C5-C6 anterior instrumentation using the standard Kristin InViZia 24 mm plate with four associated bone screws, 12 mm in length.  Inferior screws are fixed.  Superior screws are va     ENT SURGERY  2015    polyps removed from vocal cords      ESOPHAGOSCOPY, GASTROSCOPY, DUODENOSCOPY (EGD), COMBINED N/A 10/22/2020    Procedure: Esophagoscopy, gastroscopy, duodenoscopy with biopsies;  Surgeon: Marshall Mccormick MD;  Location:  OR     ESOPHAGOSCOPY, GASTROSCOPY, DUODENOSCOPY (EGD), COMBINED N/A 6/17/2021    Procedure: ESOPHAGOGASTRODUODENOSCOPY, WITH BIOPSY;  Surgeon: Darrel Damon MD;  Location:  GI     EXCISE MASS TRUNK Left 11/3/2021    Procedure: EXCISION LEFT SHOULDER LIPOMA;  Surgeon: Ibeth Conner MD;  Location:  OR     EXCISE NODE MEDIASTINAL  4/26/2013    Procedure: EXCISE NODE MEDIASTINAL;;  Surgeon: Av Peña MD;  Location:  OR     LAPAROSCOPIC CHOLECYSTECTOMY N/A 10/19/2017    Procedure: LAPAROSCOPIC CHOLECYSTECTOMY;  LAPAROSCOPIC CHOLECYSTECTOMY;  Surgeon: Ney Jerry MD;  Location:  OR     THORACOSCOPY  4/26/2013    Procedure: THORACOSCOPY;  LEFT VIDEO ASSISTED THORACOSCOPY, RESECTION OF POSTERIOR MEDIASTINAL MASS;  Surgeon: Av Peña MD;  Location:  OR     TONSILLECTOMY  as a kid     TONSILLECTOMY          Meds: reviewed  Current Outpatient Medications   Medication Sig Dispense Refill     albuterol (PROVENTIL) (2.5 MG/3ML) 0.083% neb solution Take 1 vial (2.5 mg) by nebulization every 6 hours as needed for  shortness of breath / dyspnea or wheezing 25 vial 3     albuterol (VENTOLIN HFA) 108 (90 Base) MCG/ACT inhaler Inhale 2 puffs into the lungs every 6 hours 3 each 3     amoxicillin-clavulanate (AUGMENTIN XR) 1000-62.5 MG 12 hr tablet Take 2 tablets by mouth 2 times daily for 10 days 40 tablet 0     atorvastatin (LIPITOR) 80 MG tablet TAKE ONE TABLET BY MOUTH EVERY DAY 90 tablet 0     benzonatate (TESSALON) 200 MG capsule Take 1 capsule (200 mg) by mouth 3 times daily as needed for cough 60 capsule 1     benzoyl peroxide (ACNE-CLEAR) 10 % external gel Apply topically 2 times daily as needed       blood glucose (ACCU-CHEK ALLYSON PLUS) test strip USE TO TEST BLOOD SUGAR ONCE DAILY OR AS DIRECTED 100 strip 3     blood glucose (NO BRAND SPECIFIED) lancets standard Use to test blood sugar 1 times daily or as directed. 100 each 3     blood glucose (NO BRAND SPECIFIED) lancets standard Use to test blood sugar 1 time daily 100 each 1     blood glucose (NO BRAND SPECIFIED) test strip Use to test blood sugar 1 times daily or as directed.  Dispense Accu-chek Allyson Plus or per patient insurance 100 strip 3     blood glucose monitoring (NO BRAND SPECIFIED) meter device kit Use to test blood sugar 1 times daily or as directed.  Dispense Accu-chek Allyson Plus or per insurance preference 1 kit 0     blood glucose monitoring (NO BRAND SPECIFIED) meter device kit Use to test blood sugar 1 times daily or as directed. 1 kit 0     budesonide-formoterol (SYMBICORT) 160-4.5 MCG/ACT Inhaler Inhale 2 puffs into the lungs 2 times daily 30.6 g 3     buPROPion (WELLBUTRIN XL) 150 MG 24 hr tablet TAKE ONE TABLET BY MOUTH EVERY MORNING 90 tablet 2     cetirizine HCl 10 MG CAPS Take 1 capsule (10 mg) by mouth daily as needed Takes in the spring. 90 capsule 3     clindamycin 1 % EX external gel Apply topically 2 times daily 60 g 3     clonazePAM (KLONOPIN) 1 MG tablet Take 1 tablet (1 mg) by mouth 2 times daily as needed for anxiety She needs to see  her PCP to get more tablets 5 tablet 0     doxycycline hyclate (VIBRAMYCIN) 100 MG capsule Take 1 capsule (100 mg) by mouth 2 times daily for 10 days 20 capsule 0     DULoxetine (CYMBALTA) 20 MG capsule Take 1 capsule (20 mg) by mouth daily X 7 days and if well tolerated increase to 40 mg daily. 53 capsule 1     fluticasone (FLONASE) 50 MCG/ACT nasal spray Spray 2 sprays in nostril daily 16 g 5     Fluticasone-Umeclidin-Vilanterol (TRELEGY ELLIPTA) 100-62.5-25 MCG/INH oral inhaler Inhale 1 puff into the lungs daily 28 each 11     furosemide (LASIX) 20 MG tablet Take 1 tablet (20 mg) by mouth 2 times daily 180 tablet 0     glipiZIDE (GLUCOTROL XL) 2.5 MG 24 hr tablet TAKE ONE TABLET BY MOUTH EVERY DAY 90 tablet 1     hydrocortisone 2.5 % cream APPLY TOPICALLY TO THE AFFECTED AREA(S) TWO TIMES A DAY 30 g 1     hydrOXYzine (ATARAX) 50 MG tablet TAKE TWO TABLETS BY MOUTH THREE TIMES A DAY AS NEEDED FOR ANXIETY. 70 tablet 1     lamoTRIgine (LAMICTAL) 150 MG tablet Take 150 mg by mouth daily       lisinopril (ZESTRIL) 20 MG tablet TAKE ONE TABLET BY MOUTH EVERY DAY 90 tablet 0     metFORMIN (GLUCOPHAGE) 500 MG tablet TAKE ONE TABLET BY MOUTH EVERY DAY WITH BREAKFAST 90 tablet 1     Minoxidil (MINOXIDIL FOR MEN) 5 % FOAM Externally apply 1 Pump topically 2 times daily 60 g 11     montelukast (SINGULAIR) 10 MG tablet TAKE ONE TABLET BY MOUTH AT BEDTIME 90 tablet 0     nitroGLYcerin (NITROSTAT) 0.4 MG sublingual tablet PLACE ONE TABLET UNDER THE TONGUE AT THE 1ST SIGN OF ATTACK. IF PAIN IS UNRELIEVED OR WORSENED 5 MINUTES AFTER 1ST DOSE, PROMPT MEDICAL ASSISTANCE IS NEEDED. MAY REPEAT EVERY 5 MINUTES UNTIL PAIN IS RELIEVED. MAX OF THREE DOSES 25 tablet 11     nystatin (MYCOSTATIN) 954369 UNIT/GM external powder APPLY TOPICALLY TWICE A DAY AS NEEDED SKIN RASH 45 g 3     nystatin (MYCOSTATIN) 970692 UNIT/ML suspension TAKE 5 ML BY MOUTH FOUR TIMES A  mL 0     omeprazole (PRILOSEC) 40 MG DR capsule TAKE ONE CAPSULE BY  MOUTH TWICE A  capsule 0     ondansetron (ZOFRAN) 8 MG tablet TAKE ONE-HALF TO ONE TABLET BY MOUTH EVERY 8 HOURS AS NEEDED FOR NAUSEA 30 tablet 3     ondansetron (ZOFRAN-ODT) 4 MG ODT tab Take 1 tablet (4 mg) by mouth every 6 hours as needed for nausea 12 tablet 1     oxyCODONE (ROXICODONE) 5 MG tablet Take 1-2 tablets (5-10 mg) by mouth every 4 hours as needed for moderate to severe pain 10 tablet 0     polyethylene glycol (MIRALAX) 17 GM/Dose powder Take 17 g (1 capful) by mouth daily 507 g 1     predniSONE (DELTASONE) 20 MG tablet Take two tablets (= 40mg) each day for 5 (five) days 10 tablet 0     prochlorperazine (COMPAZINE) 10 MG tablet Take 1 tablet (10 mg) by mouth every 8 hours as needed for nausea or vomiting 30 tablet 0     SM MUCUS RELIEF 600 MG 12 hr tablet TAKE TWO TABLET BY MOUTH TWO TIMES DAILY 30 tablet 0     sulfamethoxazole-trimethoprim (BACTRIM DS) 800-160 MG tablet Take 1 tablet by mouth 2 times daily Take one tablet twice daily. For additional refills, please schedule a follow-up appointment. 180 tablet 1     terbinafine (LAMISIL) 1 % external cream APPLY TO AFFECTED AREA(S) TWO TIMES A DAY 30 g 2     VENTOLIN  (90 Base) MCG/ACT inhaler INHALE TWO PUFFS BY MOUTH EVERY 6 HOURS AS NEEDED FOR SHORTNESS OF BREATH 18 g 1       Soc Hx: reviewed  Fam Hx: reviewed          Roro Low MD  Internal Medicine

## 2021-11-22 NOTE — TELEPHONE ENCOUNTER
Prior Authorization Retail Medication Request    Medication/Dose: Minoxidil (MINOXIDIL FOR MEN) 5 % FOAM  ICD code (if different than what is on RX):    Previously Tried and Failed:  none  Rationale:      Insurance Name:  Not given  Insurance ID:  Not given

## 2021-11-22 NOTE — PATIENT INSTRUCTIONS
Plan:  1. Take the Augmentin 2 tablets every 12 hours   2. Take metformin 2 tablets twice a day -- if you do not tolerate go back to 1 tablet 1-2 times a day  3. Schedule video appointment next Tuerday  4. Continue with the nebulizer  5. You need to walk 5 min every 2 hours

## 2021-11-23 NOTE — TELEPHONE ENCOUNTER
Patient is calling us back with her [employer] waiver  fax number. We are going to fax it there instead of [email or web address].

## 2021-11-23 NOTE — TELEPHONE ENCOUNTER
PRIOR AUTHORIZATION DENIED    Medication: Minoxidil (MINOXIDIL FOR MEN) 5 % FOAM--DENIED    Denial Date: 11/23/2021    Denial Rational: Per insurance rep medication is excluded

## 2021-11-24 ENCOUNTER — MYC MEDICAL ADVICE (OUTPATIENT)
Dept: DERMATOLOGY | Facility: CLINIC | Age: 51
End: 2021-11-24
Payer: COMMERCIAL

## 2021-11-24 NOTE — TELEPHONE ENCOUNTER
Ashley Antonio, PHILLIP  You 17 minutes ago (9:07 AM)     BB    Call her and let her know she needs to buy it over the counter. It is 5% Rogaine foam.       Message sent to patient.    Gloria Kumar CMA on 11/24/2021 at 9:25 AM

## 2021-11-26 DIAGNOSIS — F41.9 ANXIETY: ICD-10-CM

## 2021-11-29 ENCOUNTER — TELEPHONE (OUTPATIENT)
Dept: INTERNAL MEDICINE | Facility: CLINIC | Age: 51
End: 2021-11-29
Payer: COMMERCIAL

## 2021-11-29 RX ORDER — HYDROXYZINE HYDROCHLORIDE 50 MG/1
TABLET, FILM COATED ORAL
Qty: 70 TABLET | Refills: 1 | Status: SHIPPED | OUTPATIENT
Start: 2021-11-29 | End: 2021-12-17

## 2021-11-29 NOTE — TELEPHONE ENCOUNTER
Patient calls and she was reaching to the back of the car to grab her purse and she heard something pop and the pain was so severe she almost called an ambulance.  Now there is a nickel size lump that is painful.  Patient looking for advise on what she should do.    Could tomorrows VV be turned into a in clinic visit?

## 2021-11-30 ENCOUNTER — VIRTUAL VISIT (OUTPATIENT)
Dept: INTERNAL MEDICINE | Facility: CLINIC | Age: 51
End: 2021-11-30
Payer: COMMERCIAL

## 2021-11-30 DIAGNOSIS — J44.9 COPD, MODERATE (H): ICD-10-CM

## 2021-11-30 DIAGNOSIS — J18.9 PNEUMONIA OF RIGHT LOWER LOBE DUE TO INFECTIOUS ORGANISM: Primary | ICD-10-CM

## 2021-11-30 PROCEDURE — 99214 OFFICE O/P EST MOD 30 MIN: CPT | Mod: TEL | Performed by: INTERNAL MEDICINE

## 2021-11-30 NOTE — PROGRESS NOTES
Swetha is a 51 year old who is being evaluated via a billable video visit.      How would you like to obtain your AVS? Mail a copy  If the video visit is dropped, the invitation should be resent by: Text to cell phone: [phone]  Will anyone else be joining your video visit? No        This is a VIDEO ( using D[employer])  encounter with the patient.       Location of the provider : office   Location of the patient : home      11:34 --- 11: 50        Dr Low's note      ASSESSMENT & PLAN:                                                      (J18.9) Pneumonia of right lower lobe due to infectious organism  (primary encounter diagnosis)  (J44.9) COPD, moderate (H)  Comment: not getting better. Possible getting worse She finished the antibiotics yesterday   Plan: recommend ER eval   She agrees        Chief complaint:                                                      F/u pneumonia     SUBJECTIVE:                                                    History of present illness:    She feels worse: the coughing is worse, facial congestion, she feels she needs to stay in bed all the time   Dx w pneumonia     LOV: Nov 22:  Plan:  1. Take the Augmentin 2 tablets every 12 hours   2. Take metformin 2 tablets twice a day -- if you do not tolerate go back to 1 tablet 1-2 times a day  3. Schedule video appointment next Tuerday  4. Continue with the nebulizer  5. You need to walk 5 min every 2 hours       Diabetes Follow-up    How often are you checking your blood sugar? Two times daily  Blood sugar testing frequency justification:  Uncontrolled diabetes  What time of day are you checking your blood sugars (select all that apply)?  Before meals and after  Have you had any blood sugars above 200?  Yes last week  Have you had any blood sugars below 70?  No    What symptoms do you notice when your blood sugar is low?  None    What concerns do you have today about your diabetes? None     Do you have any of these symptoms? (Select  all that apply)  No numbness or tingling in feet.  No redness, sores or blisters on feet.  No complaints of excessive thirst.  No reports of blurry vision.  No significant changes to weight.              Hyperlipidemia Follow-Up      Are you regularly taking any medication or supplement to lower your cholesterol?   Yes- atorvastatin    Are you having muscle aches or other side effects that you think could be caused by your cholesterol lowering medication?  Yes- unbale to tell aches all the time    Hypertension Follow-up      Do you check your blood pressure regularly outside of the clinic? Yes     Are you following a low salt diet? No    Are your blood pressures ever more than 140 on the top number (systolic) OR more   than 90 on the bottom number (diastolic), for example 140/90? No    BP Readings from Last 2 Encounters:   11/12/21 122/84   11/03/21 (!) 162/98     Hemoglobin A1C (%)   Date Value   10/16/2020 5.6   02/20/2020 6.0 (H)     LDL Cholesterol Calculated (mg/dL)   Date Value   02/20/2020 96   05/10/2019 176 (H)         How many servings of fruits and vegetables do you eat daily?  0-1    On average, how many sweetened beverages do you drink each day (Examples: soda, juice, sweet tea, etc.  Do NOT count diet or artificially sweetened beverages)?   0    How many days per week do you exercise enough to make your heart beat faster? 3 or less    How many minutes a day do you exercise enough to make your heart beat faster? 9 or less    How many days per week do you miss taking your medication? 0      Review of Systems:                                                      ROS: negative for fever, chills,  wheezes, chest pain,  vomiting, abdominal pain, leg swelling Pos for cough   And sob    OBJECTIVE:           An actual physical exam can't be done during phone visit   A limited exam can sometimes be performed by video visit   NAD, she looks ill. She coughs all the time       PMHx: reviewed  Past Medical History:  "  Diagnosis Date     Anxiety state, unspecified      Asthma      Chronic pain     back pain from cyst     Contact dermatitis and other eczema, due to unspecified cause      COPD (chronic obstructive pulmonary disease) (H)      Depressive disorder, not elsewhere classified      Diabetes (H)      Emphysema with chronic bronchitis (H)      Esophageal reflux      Family history of ischemic heart disease      Fibromyalgia      Gastro-oesophageal reflux disease      Heart disease     has chest pain sometimes     History of emphysema      Hoarseness      HTN, goal below 140/90      Hyperlipidemia LDL goal <130      Liver disease     \"fatty liver\"     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)      PONV (postoperative nausea and vomiting)      Rectal bleeding       PSHx: reviewed  Past Surgical History:   Procedure Laterality Date     ANESTHESIA OUT OF OR MRI N/A 10/7/2021    Procedure: ANESTHESIA OUT OF OR MRI;  Surgeon: GENERIC ANESTHESIA PROVIDER;  Location: [facility]     ARTHROSCOPY SHOULDER DECOMPRESSION Left 10/21/2015    Procedure: ARTHROSCOPY SHOULDER DECOMPRESSION;  Surgeon: Julien Milian MD;  [facility]: [facility]     BIOPSY ARTERY TEMPORAL Left 3/11/2020    Procedure: LEFT TEMPORAL ARTERY BIOPSY;  Surgeon: Ibeth Conner MD;  [facility]: [facility]     BRONCHIAL THERMOPLASTY N/A 11/14/2014    Procedure: BRONCHIAL THERMOPLASTY;  Surgeon: Ward Whitaker MD;  [facility]     BRONCHIAL THERMOPLASTY N/A 12/19/2014    Procedure: BRONCHIAL THERMOPLASTY;  Surgeon: Ward Whitaker MD;  [facility]     BRONCHIAL THERMOPLASTY N/A 2/6/2015    Procedure: BRONCHIAL THERMOPLASTY;  Surgeon: Ward Whitaker MD;  [facility]     C APPENDECTOMY  at age 17     COLONOSCOPY N/A 11/26/2018    Procedure: COLONOSCOPY ([facility]);  Surgeon: Marshall Oakes MD;  [facility]: [facility]     COLONOSCOPY N/A 10/22/2020    Procedure: Colonoscopy;  Surgeon: Marshall Mccormick MD;  [facility]: [facility]     DISCECTOMY, FUSION " CERVICAL ANTERIOR ONE LEVEL, COMBINED N/A 5/1/2018    Procedure: COMBINED DISCECTOMY, FUSION CERVICAL ANTERIOR ONE LEVEL;  1.  C5-C6 anterior cervical diskectomy and fusion.    2.  C5-C6 application of intervertebral biomechanical device for interbody fusion purposes.    3.  C5-C6 anterior instrumentation using the standard Kristin InViZia 24 mm plate with four associated bone screws, 12 mm in length.  Inferior screws are fixed.  Superior screws are va     ENT SURGERY  2015    polyps removed from vocal cords      ESOPHAGOSCOPY, GASTROSCOPY, DUODENOSCOPY (EGD), COMBINED N/A 10/22/2020    Procedure: Esophagoscopy, gastroscopy, duodenoscopy with biopsies;  Surgeon: Marshall Mccormick MD;  Location:  OR     ESOPHAGOSCOPY, GASTROSCOPY, DUODENOSCOPY (EGD), COMBINED N/A 6/17/2021    Procedure: ESOPHAGOGASTRODUODENOSCOPY, WITH BIOPSY;  Surgeon: Darrel Damon MD;  Location:  GI     EXCISE MASS TRUNK Left 11/3/2021    Procedure: EXCISION LEFT SHOULDER LIPOMA;  Surgeon: Ibeth Conner MD;  Location:  OR     EXCISE NODE MEDIASTINAL  4/26/2013    Procedure: EXCISE NODE MEDIASTINAL;;  Surgeon: Av Peña MD;  Location:  OR     LAPAROSCOPIC CHOLECYSTECTOMY N/A 10/19/2017    Procedure: LAPAROSCOPIC CHOLECYSTECTOMY;  LAPAROSCOPIC CHOLECYSTECTOMY;  Surgeon: Ney Jerry MD;  Location:  OR     THORACOSCOPY  4/26/2013    Procedure: THORACOSCOPY;  LEFT VIDEO ASSISTED THORACOSCOPY, RESECTION OF POSTERIOR MEDIASTINAL MASS;  Surgeon: vA Peña MD;  Location:  OR     TONSILLECTOMY  as a kid     TONSILLECTOMY          Meds: reviewed  Current Outpatient Medications   Medication Sig Dispense Refill     albuterol (PROVENTIL) (2.5 MG/3ML) 0.083% neb solution Take 1 vial (2.5 mg) by nebulization every 6 hours as needed for shortness of breath / dyspnea or wheezing 25 vial 3     albuterol (VENTOLIN HFA) 108 (90 Base) MCG/ACT inhaler Inhale 2 puffs into the lungs every 6 hours 3 each 3      atorvastatin (LIPITOR) 80 MG tablet TAKE ONE TABLET BY MOUTH EVERY DAY 90 tablet 0     benzonatate (TESSALON) 200 MG capsule Take 1 capsule (200 mg) by mouth 3 times daily as needed for cough 60 capsule 1     benzoyl peroxide (ACNE-CLEAR) 10 % external gel Apply topically 2 times daily as needed       blood glucose (ACCU-CHEK ALLYSON PLUS) test strip USE TO TEST BLOOD SUGAR ONCE DAILY OR AS DIRECTED 100 strip 3     blood glucose (NO BRAND SPECIFIED) lancets standard Use to test blood sugar 1 times daily or as directed. 100 each 3     blood glucose (NO BRAND SPECIFIED) lancets standard Use to test blood sugar 1 time daily 100 each 1     blood glucose (NO BRAND SPECIFIED) test strip Use to test blood sugar 1 times daily or as directed.  Dispense Accu-chek Allyson Plus or per patient insurance 100 strip 3     blood glucose monitoring (NO BRAND SPECIFIED) meter device kit Use to test blood sugar 1 times daily or as directed.  Dispense Accu-chek Allyson Plus or per insurance preference 1 kit 0     blood glucose monitoring (NO BRAND SPECIFIED) meter device kit Use to test blood sugar 1 times daily or as directed. 1 kit 0     budesonide-formoterol (SYMBICORT) 160-4.5 MCG/ACT Inhaler Inhale 2 puffs into the lungs 2 times daily 30.6 g 3     buPROPion (WELLBUTRIN XL) 150 MG 24 hr tablet TAKE ONE TABLET BY MOUTH EVERY MORNING 90 tablet 2     cetirizine HCl 10 MG CAPS Take 1 capsule (10 mg) by mouth daily as needed Takes in the spring. 90 capsule 3     clindamycin 1 % EX external gel Apply topically 2 times daily 60 g 3     clonazePAM (KLONOPIN) 1 MG tablet Take 1 tablet (1 mg) by mouth 2 times daily as needed for anxiety She needs to see her PCP to get more tablets 5 tablet 0     DULoxetine (CYMBALTA) 20 MG capsule Take 1 capsule (20 mg) by mouth daily X 7 days and if well tolerated increase to 40 mg daily. 53 capsule 1     fluticasone (FLONASE) 50 MCG/ACT nasal spray Spray 2 sprays in nostril daily 16 g 5      Fluticasone-Umeclidin-Vilanterol (TRELEGY ELLIPTA) 100-62.5-25 MCG/INH oral inhaler Inhale 1 puff into the lungs daily 28 each 11     furosemide (LASIX) 20 MG tablet Take 1 tablet (20 mg) by mouth 2 times daily 180 tablet 0     glipiZIDE (GLUCOTROL XL) 2.5 MG 24 hr tablet TAKE ONE TABLET BY MOUTH EVERY DAY 90 tablet 1     hydrocortisone 2.5 % cream APPLY TOPICALLY TO THE AFFECTED AREA(S) TWO TIMES A DAY 30 g 1     hydrOXYzine (ATARAX) 50 MG tablet TAKE TWO TABLETS BY MOUTH THREE TIMES A DAY AS NEEDED FOR ANXIETY . 70 tablet 1     lamoTRIgine (LAMICTAL) 150 MG tablet Take 150 mg by mouth daily       lisinopril (ZESTRIL) 20 MG tablet TAKE ONE TABLET BY MOUTH EVERY DAY 90 tablet 0     metFORMIN (GLUCOPHAGE) 500 MG tablet TAKE ONE TABLET BY MOUTH EVERY DAY WITH BREAKFAST 90 tablet 1     montelukast (SINGULAIR) 10 MG tablet TAKE ONE TABLET BY MOUTH AT BEDTIME 90 tablet 0     nitroGLYcerin (NITROSTAT) 0.4 MG sublingual tablet PLACE ONE TABLET UNDER THE TONGUE AT THE 1ST SIGN OF ATTACK. IF PAIN IS UNRELIEVED OR WORSENED 5 MINUTES AFTER 1ST DOSE, PROMPT MEDICAL ASSISTANCE IS NEEDED. MAY REPEAT EVERY 5 MINUTES UNTIL PAIN IS RELIEVED. MAX OF THREE DOSES 25 tablet 11     nystatin (MYCOSTATIN) 362451 UNIT/GM external powder APPLY TOPICALLY TWICE A DAY AS NEEDED SKIN RASH 45 g 3     nystatin (MYCOSTATIN) 365245 UNIT/ML suspension TAKE 5 ML BY MOUTH FOUR TIMES A  mL 0     omeprazole (PRILOSEC) 40 MG DR capsule TAKE ONE CAPSULE BY MOUTH TWICE A  capsule 0     ondansetron (ZOFRAN) 8 MG tablet TAKE ONE-HALF TO ONE TABLET BY MOUTH EVERY 8 HOURS AS NEEDED FOR NAUSEA 30 tablet 3     ondansetron (ZOFRAN-ODT) 4 MG ODT tab Take 1 tablet (4 mg) by mouth every 6 hours as needed for nausea 12 tablet 1     polyethylene glycol (MIRALAX) 17 GM/Dose powder Take 17 g (1 capful) by mouth daily 507 g 1     prochlorperazine (COMPAZINE) 10 MG tablet Take 1 tablet (10 mg) by mouth every 8 hours as needed for nausea or vomiting 30 tablet  0     SM MUCUS RELIEF 600 MG 12 hr tablet TAKE TWO TABLET BY MOUTH TWO TIMES DAILY 30 tablet 0     sulfamethoxazole-trimethoprim (BACTRIM DS) 800-160 MG tablet Take 1 tablet by mouth 2 times daily Take one tablet twice daily. For additional refills, please schedule a follow-up appointment. 180 tablet 1     terbinafine (LAMISIL) 1 % external cream APPLY TO AFFECTED AREA(S) TWO TIMES A DAY 30 g 2     VENTOLIN  (90 Base) MCG/ACT inhaler INHALE TWO PUFFS BY MOUTH EVERY 6 HOURS AS NEEDED FOR SHORTNESS OF BREATH 18 g 1       Soc Hx: reviewed  Fam Hx: reviewed          Roro Low MD  Internal Medicine

## 2021-12-03 ENCOUNTER — TELEPHONE (OUTPATIENT)
Dept: INTERNAL MEDICINE | Facility: CLINIC | Age: 51
End: 2021-12-03
Payer: COMMERCIAL

## 2021-12-05 ENCOUNTER — MEDICAL CORRESPONDENCE (OUTPATIENT)
Dept: HEALTH INFORMATION MANAGEMENT | Facility: CLINIC | Age: 51
End: 2021-12-05
Payer: COMMERCIAL

## 2021-12-06 NOTE — TELEPHONE ENCOUNTER
"Approval given for requested home care orders.  Home care nurse Ajit told patient on Friday to take her Lasix twice daily as ordered.  Patient had been taking once a day and the 2nd dose was \"just if needed\".  Ajit also told patient that if symptoms worsened or failed to improve she was to go to ER. Ajit will reassess patient and have her make an in-clinic appt. To follow up on pneumonia and weight gain.  SERJIO Chandra R.N.      "

## 2021-12-10 DIAGNOSIS — R73.03 PREDIABETES: ICD-10-CM

## 2021-12-13 RX ORDER — GLIPIZIDE 2.5 MG/1
TABLET, EXTENDED RELEASE ORAL
Qty: 90 TABLET | Refills: 1 | Status: SHIPPED | OUTPATIENT
Start: 2021-12-13 | End: 2022-02-17

## 2021-12-14 ASSESSMENT — ENCOUNTER SYMPTOMS
EYE IRRITATION: 1
BOWEL INCONTINENCE: 0
HYPOTENSION: 0
SMELL DISTURBANCE: 0
DECREASED CONCENTRATION: 1
LIGHT-HEADEDNESS: 0
EYE REDNESS: 0
NIGHT SWEATS: 0
BACK PAIN: 1
FEVER: 0
JOINT SWELLING: 0
POOR WOUND HEALING: 0
SNORES LOUDLY: 1
WEIGHT LOSS: 0
INCREASED ENERGY: 1
JAUNDICE: 0
MYALGIAS: 1
NECK MASS: 0
CHILLS: 0
LEG PAIN: 1
MUSCLE CRAMPS: 1
SINUS CONGESTION: 1
ABDOMINAL PAIN: 1
SPUTUM PRODUCTION: 1
ALTERED TEMPERATURE REGULATION: 1
COUGH: 1
NERVOUS/ANXIOUS: 1
NAIL CHANGES: 1
POLYPHAGIA: 1
HEMOPTYSIS: 0
EYE PAIN: 0
BLOOD IN STOOL: 1
PALPITATIONS: 0
NAUSEA: 1
POSTURAL DYSPNEA: 1
INSOMNIA: 1
DOUBLE VISION: 0
HYPERTENSION: 0
HOARSE VOICE: 1
DEPRESSION: 1
HEARTBURN: 1
DECREASED APPETITE: 0
HALLUCINATIONS: 0
EXERCISE INTOLERANCE: 0
TASTE DISTURBANCE: 1
COUGH DISTURBING SLEEP: 1
MUSCLE WEAKNESS: 1
STIFFNESS: 1
VOMITING: 1
WHEEZING: 1
SKIN CHANGES: 1
BLOATING: 1
DYSPNEA ON EXERTION: 1
CONSTIPATION: 1
PANIC: 1
SINUS PAIN: 0
FATIGUE: 1
SHORTNESS OF BREATH: 1
ARTHRALGIAS: 1
POLYDIPSIA: 1
SLEEP DISTURBANCES DUE TO BREATHING: 0
EYE WATERING: 0
ORTHOPNEA: 1
DIARRHEA: 1
SORE THROAT: 0
TROUBLE SWALLOWING: 1
RECTAL PAIN: 0
WEIGHT GAIN: 1
NECK PAIN: 1
SYNCOPE: 0

## 2021-12-15 ENCOUNTER — TELEPHONE (OUTPATIENT)
Dept: INTERNAL MEDICINE | Facility: CLINIC | Age: 51
End: 2021-12-15

## 2021-12-15 ENCOUNTER — VIRTUAL VISIT (OUTPATIENT)
Dept: GASTROENTEROLOGY | Facility: CLINIC | Age: 51
End: 2021-12-15
Payer: COMMERCIAL

## 2021-12-15 DIAGNOSIS — K30 DELAYED GASTRIC EMPTYING: ICD-10-CM

## 2021-12-15 DIAGNOSIS — K59.00 CONSTIPATION, UNSPECIFIED CONSTIPATION TYPE: ICD-10-CM

## 2021-12-15 DIAGNOSIS — E66.09 OBESITY DUE TO EXCESS CALORIES WITH SERIOUS COMORBIDITY, UNSPECIFIED CLASSIFICATION: Primary | ICD-10-CM

## 2021-12-15 DIAGNOSIS — R10.13 EPIGASTRIC PAIN: ICD-10-CM

## 2021-12-15 PROCEDURE — 99214 OFFICE O/P EST MOD 30 MIN: CPT | Mod: 95 | Performed by: INTERNAL MEDICINE

## 2021-12-15 NOTE — NURSING NOTE
Patient verified meds and allergies are correct via patients echeck-in.    Hollie Manzanares, Virtual Facilitator

## 2021-12-15 NOTE — PATIENT INSTRUCTIONS
Start using miralax daily.    Try eating smaller meals and snacks more frequently throughout the day instead of 1-2 large meals a day.  Try tracking your calories as I suspect you are taking in more than you realize - pay attention to labels and the recommended serving sizes.    Please see a nutritionist.  I would also like you to see the weight loss clinic again.

## 2021-12-15 NOTE — PROGRESS NOTES
"       HPI:    Sarah presents today for a video visit to follow-up.  Says her GI issues have been horrible lately.  Continues to feel like food doesn't digest and sits in her stomach.  Also gets pain whenever she gets up and moves around which she attributes to her hiatal hernia.  Despite this has been gaining a significant amount of weight lately - started a few months ago - seemed to accelerate when she was started on prednisone for pneumonia (off now).  Does think her weight is driving a lot of her issues - admits that with the weight gain it is becoming harder and more painful for her to be mobile.  Has also been struggling with constipation - miralax helps but not using regularly.  Was evaluated in weight loss clinic a few months ago - was concern at the time that she didn't have good control of her depression/anxiety and history of alcohol abuse.  She reports that now she is doing better from the standpoint - working with a therapist she has a good relationship with and is stable on medications.  Would like to re-establish care      Past Medical History:   Diagnosis Date     Anxiety state, unspecified      Asthma      Chronic pain     back pain from cyst     Contact dermatitis and other eczema, due to unspecified cause      COPD (chronic obstructive pulmonary disease) (H)      Depressive disorder, not elsewhere classified      Diabetes (H)      Emphysema with chronic bronchitis (H)      Esophageal reflux      Family history of ischemic heart disease      Fibromyalgia      Gastro-oesophageal reflux disease      Heart disease     has chest pain sometimes     History of emphysema      Hoarseness      HTN, goal below 140/90      Hyperlipidemia LDL goal <130      Liver disease     \"fatty liver\"     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)      PONV (postoperative nausea and vomiting)      Rectal bleeding        Past Surgical History:   Procedure Laterality Date     ANESTHESIA OUT OF OR MRI N/A 10/7/2021    " Procedure: ANESTHESIA OUT OF OR MRI;  Surgeon: GENERIC ANESTHESIA PROVIDER;  [facility]: [facility]     ARTHROSCOPY SHOULDER DECOMPRESSION Left 10/21/2015    Procedure: ARTHROSCOPY SHOULDER DECOMPRESSION;  Surgeon: Julien Milian MD;  [facility]: [facility]     BIOPSY ARTERY TEMPORAL Left 3/11/2020    Procedure: LEFT TEMPORAL ARTERY BIOPSY;  Surgeon: Ibeth Conner MD;  [facility]     BRONCHIAL THERMOPLASTY N/A 11/14/2014    Procedure: BRONCHIAL THERMOPLASTY;  Surgeon: Ward Whitaker MD;  [facility]     BRONCHIAL THERMOPLASTY N/A 12/19/2014    Procedure: BRONCHIAL THERMOPLASTY;  Surgeon: Ward Whitaker MD;  [facility]     BRONCHIAL THERMOPLASTY N/A 2/6/2015    Procedure: BRONCHIAL THERMOPLASTY;  Surgeon: Ward Whitaker MD;  [facility]     C APPENDECTOMY  at age 17     COLONOSCOPY N/A 11/26/2018    Procedure: COLONOSCOPY ([facility]);  Surgeon: Marshall Oakes MD;  [facility]     COLONOSCOPY N/A 10/22/2020    Procedure: Colonoscopy;  Surgeon: Marshall Mccormick MD;  [facility]     DISCECTOMY, FUSION CERVICAL ANTERIOR ONE LEVEL, COMBINED N/A 5/1/2018    Procedure: COMBINED DISCECTOMY, FUSION CERVICAL ANTERIOR ONE LEVEL;  1.  C5-C6 anterior cervical diskectomy and fusion.    2.  C5-C6 application of intervertebral biomechanical device for interbody fusion purposes.    3.  C5-C6 anterior instrumentation using the standard Kristin InViZia 24 mm plate with four associated bone screws, 12 mm in length.  Inferior screws are fixed.  Superior screws are va     ENT SURGERY  2015    polyps removed from vocal cords      ESOPHAGOSCOPY, GASTROSCOPY, DUODENOSCOPY (EGD), COMBINED N/A 10/22/2020    Procedure: Esophagoscopy, gastroscopy, duodenoscopy with biopsies;  Surgeon: Marshall Mccormick MD;  Location:  OR     ESOPHAGOSCOPY, GASTROSCOPY, DUODENOSCOPY (EGD), COMBINED N/A 6/17/2021    Procedure: ESOPHAGOGASTRODUODENOSCOPY, WITH BIOPSY;  Surgeon: Darrel Damon MD;  Location: [facility]  "    EXCISE MASS TRUNK Left 11/3/2021    Procedure: EXCISION LEFT SHOULDER LIPOMA;  Surgeon: Ibeth Conner MD;  [facility]: [facility]     EXCISE NODE MEDIASTINAL  4/26/2013    Procedure: EXCISE NODE MEDIASTINAL;;  Surgeon: Av Peña MD;  [facility]: [facility]     LAPAROSCOPIC CHOLECYSTECTOMY N/A 10/19/2017    Procedure: LAPAROSCOPIC CHOLECYSTECTOMY;  LAPAROSCOPIC CHOLECYSTECTOMY;  Surgeon: Ney Jerry MD;  [facility]: [facility]     THORACOSCOPY  4/26/2013    Procedure: THORACOSCOPY;  LEFT VIDEO ASSISTED THORACOSCOPY, RESECTION OF POSTERIOR MEDIASTINAL MASS;  Surgeon: vA Peña MD;  Location: [facility]     TONSILLECTOMY  as a kid     TONSILLECTOMY         Family History   Problem Relation Age of Onset     Heart Disease Mother         murmur, mi     Hypertension Mother      Cerebrovascular Disease Mother      Depression Mother      Gallbladder Disease Mother      Asthma Mother      Family History Negative Father         does not know  him     Family History Negative Brother      Heart Disease Maternal Grandfather      Asthma Maternal Grandfather      Hypertension Maternal Grandfather      Cerebrovascular Disease Maternal Grandfather      Diabetes Maternal Grandfather      Asthma Sister      Hypertension Sister      Cerebrovascular Disease Sister      Hypertension Maternal Grandmother      Cerebrovascular Disease Maternal Grandmother        Social History     Tobacco Use     Smoking status: Current Every Day Smoker     Packs/day: 0.50     Years: 30.00     Pack years: 15.00     Types: Cigarettes     Start date: 1/1/1985     Smokeless tobacco: Never Used     Tobacco comment: smokes 6 cigarettes a day   Substance Use Topics     Alcohol use: No     Alcohol/week: 0.0 standard drinks     Comment: \"once in a blue moon\", none for five months        O:    Gen: no acute distress  HEENT: NCAT  Neck: normal ROM  Resp: nonlabored breathing  Neuro: no gross deficits  Psych: appropriate mood and " affect    Assessment and Plan:    # nausea, abdominal pain - GES with delayed gastric emptying - likely in part due to poorly controlled DM.  Weight likely contributing as well and despite these symptoms she is rapidly gaining weight at present.  Will refer to nutritionist as well as refer back to weight loss clinic as she seems to have better control of her depression/anxiety at present.    # constipation - start miralax daily    # obesity    # hiatal hernia - unlikely causing her symptoms of nausea and pain - discussed with her again today - did talk about how this maybe exacerbated by weight gain    RTC 3 months    Lisa Harris, DO     Video-Visit Details     Type of service:  Video Visit     Video Start Time: 10:30 AM  Video End Time (time video stopped): 10:44 AM    Originating Location (pt. Location): home     Distant Location (provider location):  [facility]      Mode of Communication:  Video Conference via [employer] for HPI/ROS submitted by the patient on 12/14/2021  General Symptoms: Yes  Skin Symptoms: Yes  HENT Symptoms: Yes  EYE SYMPTOMS: Yes  HEART SYMPTOMS: Yes  LUNG SYMPTOMS: Yes  INTESTINAL SYMPTOMS: Yes  URINARY SYMPTOMS: No  GYNECOLOGIC SYMPTOMS: No  BREAST SYMPTOMS: No  SKELETAL SYMPTOMS: Yes  BLOOD SYMPTOMS: No  NERVOUS SYSTEM SYMPTOMS: No  MENTAL HEALTH SYMPTOMS: Yes  Ear pain: No  Ear discharge: No  Hearing loss: No  Tinnitus: No  Nosebleeds: No  Congestion: Yes  Sinus pain: No  Trouble swallowing: Yes   Voice hoarseness: Yes  Mouth sores: No  Sore throat: No  Tooth pain: No  Gum tenderness: No  Bleeding gums: No  Change in taste: Yes  Change in sense of smell: No  Dry mouth: Yes  Hearing aid used: No  Neck lump: No  Fever: No  Loss of appetite: No  Weight loss: No  Weight gain: Yes  Fatigue: Yes  Night sweats: No  Chills: No  Increased stress: Yes  Excessive hunger: Yes  Excessive thirst: Yes  Feeling hot or cold when others believe the temperature is normal:  Yes  Loss of height: No  Post-operative complications: No  Surgical site pain: No  Hallucinations: No  Change in or Loss of Energy: Yes  Hyperactivity: Yes  Confusion: Yes  Changes in hair: Yes  Changes in moles/birth marks: Yes  Itching: Yes  Rashes: Yes  Changes in nails: Yes  Acne: No  Hair in places you don't want it: No  Change in facial hair: No  Warts: No  Non-healing sores: No  Scarring: No  Flaking of skin: No  Color changes of hands/feet in cold : No  Sun sensitivity: No  Skin thickening: No  Eye pain: No  Vision loss: No  Dry eyes: Yes  Watery eyes: No  Eye bulging: No  Double vision: No  Flashing of lights: No  Spots: No  Floaters: No  Redness: No  Crossed eyes: No  Tunnel Vision: No  Yellowing of eyes: No  Eye irritation: Yes  Chest pain or pressure: No  Fast or irregular heartbeat: No  Pain in legs with walking: Yes  Trouble breathing while lying down: Yes  Fingers or toes appear blue: No  High blood pressure: No  Low blood pressure: No  Fainting: No  Murmurs: No  Pacemaker: No  Varicose veins: No  Edema or swelling: Yes  Wake up at night with shortness of breath: No  Light-headedness: No  Exercise intolerance: No  Cough: Yes  Sputum or phlegm: Yes  Coughing up blood: No  Difficulty breating or shortness of breath: Yes  Snoring: Yes  Wheezing: Yes  Difficulty breathing on exertion: Yes  Nighttime Cough: Yes  Difficulty breathing when lying flat: Yes  Heart burn or indigestion: Yes  Nausea: Yes  Vomiting: Yes  Abdominal pain: Yes  Bloating: Yes  Constipation: Yes  Diarrhea: Yes  Blood in stool: Yes  Black stools: No  Rectal or Anal pain: No  Fecal incontinence: No  Yellowing of skin or eyes: No  Vomit with blood: No  Change in stools: Yes  Back pain: Yes  Muscle aches: Yes  Neck pain: Yes  Swollen joints: No  Joint pain: Yes  Bone pain: Yes  Muscle cramps: Yes  Muscle weakness: Yes  Joint stiffness: Yes  Bone fracture: No  Nervous or Anxious: Yes  Depression: Yes  Trouble sleeping: Yes  Trouble thinking  or concentrating: Yes  Mood changes: No  Panic attacks: Yes

## 2021-12-15 NOTE — PROGRESS NOTES
Swetha is a 51 year old who is being evaluated via a billable video visit.      How would you like to obtain your AVS? MyChart  If the video visit is dropped, the invitation should be resent by: Text to cell phone: [phone]  Will anyone else be joining your video visit? No

## 2021-12-23 DIAGNOSIS — Z53.9 DIAGNOSIS NOT YET DEFINED: Primary | ICD-10-CM

## 2021-12-23 PROCEDURE — G0179 MD RECERTIFICATION HHA PT: HCPCS | Performed by: INTERNAL MEDICINE

## 2021-12-26 ENCOUNTER — HEALTH MAINTENANCE LETTER (OUTPATIENT)
Age: 51
End: 2021-12-26

## 2022-01-03 DIAGNOSIS — R73.9 BLOOD GLUCOSE ELEVATED: ICD-10-CM

## 2022-01-03 DIAGNOSIS — Z71.6 ENCOUNTER FOR SMOKING CESSATION COUNSELING: Primary | ICD-10-CM

## 2022-01-04 ENCOUNTER — NURSE TRIAGE (OUTPATIENT)
Dept: INTERNAL MEDICINE | Facility: CLINIC | Age: 52
End: 2022-01-04
Payer: COMMERCIAL

## 2022-01-04 RX ORDER — NICOTINE 21 MG/24HR
1 PATCH, TRANSDERMAL 24 HOURS TRANSDERMAL EVERY 24 HOURS
Qty: 28 PATCH | Refills: 1 | Status: SHIPPED | OUTPATIENT
Start: 2022-01-04 | End: 2022-08-17

## 2022-01-04 NOTE — TELEPHONE ENCOUNTER
Patient calls and states she has a URI and would like a Z-Pack and Prednisone 20 MG.  Patient states this is a frequent problem and that PCP does not make her come into the Clinic.  Patient states it is not COVID-29, but had not been tested recently.  Cough Nasal pain and pressure, and tight chest. No other symptoms.  Please address and advise.

## 2022-01-05 NOTE — TELEPHONE ENCOUNTER
Left message on home/cell voicemail asking patient to return call to clinic. SERJIO Chandra R.N.

## 2022-01-05 NOTE — TELEPHONE ENCOUNTER
Patient calls back, she was advised of recommendation. She chose to schedule Virtual Visit with Magaly. Patient then mentions that her BP was very high last night.     S-(situation): elevated BP    B-(background): woke up with elevated BP last night. She was very concerned about this, her sister told her to go to the ER, but patient states she took a nitroglycerin tablet and BP improved so she did not go in.    A-(assessment): BP last night was 213/133 with pulse of 103. She was able to go to sleep after taking Nitroglycerin and just waking up for the day. She checked BP while on the phone and it was 127/87. She did not have any missed doses of BP medication yesterday.     R-(recommendations): Discuss with provider tomorrow, but if having high BP again with shortness of breath or chest pain, she needs to go to ER.     Brenda Franks RN  [facility]     Reason for Disposition    Systolic BP >= 160 OR Diastolic >= 100    Additional Information    Negative: Sounds like a life-threatening emergency to the triager    Negative: Pregnant > 20 weeks or postpartum (< 6 weeks after delivery) and new hand or face swelling    Negative: Pregnant > 20 weeks and BP > 140/90    Negative: Systolic BP >= 160 OR Diastolic >= 100, and any cardiac or neurologic symptoms (e.g., chest pain, difficulty breathing, unsteady gait, blurred vision)    Negative: Patient sounds very sick or weak to the triager    Negative: BP Systolic BP >= 140 OR Diastolic >= 90 and postpartum (from 0 to 6 weeks after delivery)    Negative: Systolic BP >= 180 OR Diastolic >= 110, and missed most recent dose of blood pressure medication    Negative: Systolic BP >= 180 OR Diastolic >= 110    Negative: Patient wants to be seen    Negative: Ran out of BP medications    Negative: Taking BP medications and feels is having side effects (e.g., impotence, cough, dizziness)    Protocols used: HIGH BLOOD PRESSURE-A-OH

## 2022-01-06 ENCOUNTER — TELEPHONE (OUTPATIENT)
Dept: INTERNAL MEDICINE | Facility: CLINIC | Age: 52
End: 2022-01-06

## 2022-01-06 ENCOUNTER — CARE COORDINATION (OUTPATIENT)
Dept: CARDIOLOGY | Facility: CLINIC | Age: 52
End: 2022-01-06

## 2022-01-06 NOTE — PROGRESS NOTES
"Pt returned call. She reports she was supposed to see PCP today, but missed visit. She is scheduled for video visit tomorrow but pt reports PCP office recommended she also call Cardiology to be seen next available. Pt reports for the last few weeks she has been experiencing increased SOB with exertion. The last few days now BP in 200/100's on a few occasions. Pt very anxious and worried as heart disease runs in her family. She has a sister that  from MI at home per her report. She reports yesterday she went to grocery store and then hen got back checked her BP was 244/133 and that was highest. Pt reports when BP is high she does feel twinges in her chest, but if she takes a nitroglycerin her BP gets better and her sx get better as well. BP reports she still gets BP's in normal range in 120's, but if she does any exertion her BP jumps up. She denies chest pain at this time. Advised pt go to ER if BP goes over 200 again and/or she develops chest pain/SOB. Pt agreeable. Offered next available visit with Dr. Milian tomorrow am in [facility] clinic. Reviewed with pt we have not seen her in 2 years so will need to re-establish. She stated understanding. She does not want to see the female MD she has seen in the past, states they \"butt heads\". Pt denies fever or chills. Reports chronic cough. Again advised pt to seek ER evaluation if elevated BP again or chest pain/SOB. She stated understanding.     Marlee Garcia RN, BSN  22 at 3:30 PM   "

## 2022-01-06 NOTE — TELEPHONE ENCOUNTER
"Call received from patient stating she had a virtual visit scheduled today but she has been so sick that she slept through her appointment. States she has been really short of breath for the last couple of weeks. States she gets exerted even walking to the bathroom or talking. Patient was able talk without noticeable shortness of breath during our conversation. Patient states she is also concerned about her blood pressure. States yesterday her blood pressure was 244/133 P 110 after running some errands. The day before her blood pressure was similar. Patient took a nitro yesterday and her BP improved. States if she is active at all her blood pressure \"garland rockets\". States her sister has had 3 heart attacks with minimal symptoms so this concerns her. Patient has not checked her blood pressure today because she just got up. States she usually doesn't sleep this late. Also reports fatigue, leg pain and occasional chest pain. She called cardiology and they told her to call primary care provider. Patient reports \"a grabbing sensation\" in her chest. Advised ER. Patient took her blood pressure and blood pressure currently is 156/87 P 90. Reports chest tightness. Again advised ER due to chest pressure. Patient states if her blood pressure goes up she will go to the ER. Again advised ER. Patient states she will rest and if her blood pressure goes above 200 she will press her medical alert button. Advised if CP continues to go to ER. Patient agrees.    Next 5 appointments (look out 90 days)    Jan 07, 2022 11:00 AM  (Arrive by 10:40 AM)  Provider Visit with Denis Pan MD  [facility] ([facility] ) [facility] [address]  [facility] [address]  [phone]   Ranjit 10, 2022  3:20 PM  (Arrive by 3:05 PM)  Return Visit with Eileen Pak PA-C  [facility] ([facility] ) [address] " [facility] [address] [phone]

## 2022-01-06 NOTE — PROGRESS NOTES
Received message from scheduling that pt called stating she needs to be seen, pt having symptoms.     Reviewed chart, pt last seen 3/2020 by NOE Prado:   Myalgia, leg fatigue, jaw fatigue  Comment: No improvement off statin, so resumed.  Exercise ABIs just mildly abnormal.  Left temporal artery biopsy negative for temporal arteritis.    Plan: Defer further evaluation to Dr. Low. ?rheumatology referral?      (R06.02) SOB (shortness of breath)  Comment: Improved with treatment with antibiotics and steroids.   Plan: No further evaluation. No diuretic needed at this time.      PAD  Comment: Exercise ABIs mildly abnormal.    Plan:  Continue atorvastatin. Start ASA 81 mg.  Should there be no other etiology for her leg fatigue, we could consider PAD rehab, though the level of leg fatigue seems out of proportion to the SILVIA results.     No follow up orders and per note, deferred back to PCP for management. Notes in Epic from pt whom spoke to PCP triage yesterday for elevated BP. Pt reported /133 with pulse of 103 1/4 PM. She took 1 nitroglycerin and BP improved. BP 1/5 with nurse on phone was 127/87. Pt was scheduled to see PCP today. Pt missed that appt, per chart, PCP tried calling for virtual visit numerous times and pt did not answer.     Called pt to assess, no answer. LVM asking her to call back to discuss.     Marlee Garcia, RN, BSN  01/06/22 at 2:38 PM

## 2022-01-10 ENCOUNTER — OFFICE VISIT (OUTPATIENT)
Dept: CARDIOLOGY | Facility: CLINIC | Age: 52
End: 2022-01-10
Payer: COMMERCIAL

## 2022-01-10 ENCOUNTER — VIRTUAL VISIT (OUTPATIENT)
Dept: INTERNAL MEDICINE | Facility: CLINIC | Age: 52
End: 2022-01-10
Payer: COMMERCIAL

## 2022-01-10 VITALS
HEIGHT: 63 IN | BODY MASS INDEX: 43.77 KG/M2 | WEIGHT: 247 LBS | SYSTOLIC BLOOD PRESSURE: 124 MMHG | HEART RATE: 72 BPM | DIASTOLIC BLOOD PRESSURE: 72 MMHG

## 2022-01-10 DIAGNOSIS — J44.1 COPD EXACERBATION (H): ICD-10-CM

## 2022-01-10 DIAGNOSIS — R07.2 PRECORDIAL PAIN: ICD-10-CM

## 2022-01-10 DIAGNOSIS — R06.02 SOB (SHORTNESS OF BREATH): Primary | ICD-10-CM

## 2022-01-10 DIAGNOSIS — Z71.6 ENCOUNTER FOR SMOKING CESSATION COUNSELING: ICD-10-CM

## 2022-01-10 DIAGNOSIS — R05.9 COUGH: Primary | ICD-10-CM

## 2022-01-10 PROCEDURE — 99213 OFFICE O/P EST LOW 20 MIN: CPT | Mod: 95 | Performed by: PHYSICIAN ASSISTANT

## 2022-01-10 PROCEDURE — 99214 OFFICE O/P EST MOD 30 MIN: CPT | Performed by: INTERNAL MEDICINE

## 2022-01-10 RX ORDER — NICOTINE 21 MG/24HR
1 PATCH, TRANSDERMAL 24 HOURS TRANSDERMAL EVERY 24 HOURS
Qty: 28 PATCH | Refills: 1 | Status: SHIPPED | OUTPATIENT
Start: 2022-01-10 | End: 2022-02-16

## 2022-01-10 RX ORDER — AMLODIPINE BESYLATE 2.5 MG/1
5 TABLET ORAL DAILY
Qty: 30 TABLET | Refills: 3 | Status: SHIPPED | OUTPATIENT
Start: 2022-01-10 | End: 2022-03-07

## 2022-01-10 RX ORDER — PREDNISONE 20 MG/1
40 TABLET ORAL DAILY
Qty: 10 TABLET | Refills: 0 | Status: SHIPPED | OUTPATIENT
Start: 2022-01-10 | End: 2022-01-15

## 2022-01-10 RX ORDER — AZITHROMYCIN 250 MG/1
TABLET, FILM COATED ORAL
Qty: 6 TABLET | Refills: 0 | Status: SHIPPED | OUTPATIENT
Start: 2022-01-10 | End: 2022-01-15

## 2022-01-10 ASSESSMENT — MIFFLIN-ST. JEOR: SCORE: 1699.51

## 2022-01-10 NOTE — PROGRESS NOTES
"Swetha is a 51 year old who is being evaluated via a billable video visit.      How would you like to obtain your AVS? MyChart  If the video visit is dropped, the invitation should be resent by: Text to cell phone: [phone]  Will anyone else be joining your video visit? No      Video Start Time: 12:29     Assessment & Plan     Cough  Discussed that symptoms may be viral in etiology. However, she does meet criteria for COPD exacerbation. Prescribed azithromycin and prednisone burst. Discussed use of OTC medications to help with symptoms. Stay hydrated. Follow-up if symptoms worsen.  Advised COVID test, but she declines. States this is \"the same URI\" she always gets. Discussed that COVID can present like a URI. Since she is declining testing, I advised her to stay home until symptoms improve and to isolate for at least 10 days. She needs to wear a mask when home nurses come to her house.  - azithromycin (ZITHROMAX) 250 MG tablet; Take 2 tablets (500 mg) by mouth daily for 1 day, THEN 1 tablet (250 mg) daily for 4 days.  - predniSONE (DELTASONE) 20 MG tablet; Take 2 tablets (40 mg) by mouth daily for 5 days    COPD exacerbation (H)  Increase cough with purulent sputum. Will treat as COPD exacerbation. Continue inhalers.  - azithromycin (ZITHROMAX) 250 MG tablet; Take 2 tablets (500 mg) by mouth daily for 1 day, THEN 1 tablet (250 mg) daily for 4 days.  - predniSONE (DELTASONE) 20 MG tablet; Take 2 tablets (40 mg) by mouth daily for 5 days    Encounter for smoking cessation counseling  Encouraged smoking cessation efforts. Meds as below.  - nicotine (NICODERM CQ) 21 MG/24HR 24 hr patch; Place 1 patch onto the skin every 24 hours  - nicotine (NICORETTE) 2 MG gum; Place 1 each (2 mg) inside cheek every hour as needed for smoking cessation    17 minutes spent on the date of the encounter doing chart review, history and exam, documentation and further activities per the note       Tobacco Cessation:   reports that " she has been smoking cigarettes. She started smoking about 37 years ago. She has a 15.00 pack-year smoking history. She has never used smokeless tobacco.  Tobacco Cessation Action Plan: Pharmacotherapies : Nicotine patch and other Nicotine replacement  No follow-ups on file.    PHILLIP Millard [facility] ANABEL Posada is a 51 year old who presents for the following health issues     HPI     URI like symptoms x 1week  Runny nose, cough, nasal congestion  Yellow nasal mucus. Also coughing up some yellow sputum  No noted fevers  No HAs or facial pain  No sore throat  Started with chest tightness, then dry cough  Has COPD  Reports frequent infections    Saw cardiology today. Lexiscan in 1 week    Current smoker. Would like Rx's for nicotine patches and gum. She needs to quit smoking to be a candidate for bariatric surgery.    On antibiotics in November for pneumonia    States her usual illnesses respond well to azithromycin and prednisone  Has an albuterol inhaler. Says it doesn't help much    Vaccinated for COVID  Early booster? Got booster 8/27/21. Second COVID vaccine 4/20/21.    No known exposures to illness    Has home nurses. She states that they wear masks    Review of Systems   Constitutional, HEENT, cardiovascular, pulmonary, gi and gu systems are negative, except as otherwise noted.      Objective           Vitals:  No vitals were obtained today due to virtual visit.    Physical Exam   GENERAL: Healthy, alert and no distress  EYES: Eyes grossly normal to inspection.  No discharge or erythema, or obvious scleral/conjunctival abnormalities.  RESP: No audible wheeze, cough, or visible cyanosis.  No visible retractions or increased work of breathing.    SKIN: Visible skin clear. No significant rash, abnormal pigmentation or lesions.  NEURO: Cranial nerves grossly intact.  Mentation and speech appropriate for age.  PSYCH: Mentation appears normal, affect normal/bright,  judgement and insight intact, normal speech and appearance well-groomed.          Video-Visit Details    Type of service:  Video Visit    Video End Time:12:40    Originating Location (pt. Location): Home    Distant Location (provider location):  [facility]     Platform used for Video Visit: Lexx

## 2022-01-10 NOTE — LETTER
1/10/2022    Roro Chawla MD  [address] [facility] [place] [address]    RE: Swetha Patterson       Dear Colleague,     I had the pleasure of seeing Swetha Patterson in the [facility].  HPI and Plan:   At the pleasure of seeing this 51-year-old lady for evaluation of shortness of breath and hypertension.    She has seen several my colleagues previously but does identify Dr. Tate as her primary cardiologist    Reading the excellent notes understand that this is a lady who has emphysema.  She continues to smoke and smells strongly of cigarettes today.  However she has a prescription for nicotine patch and she is determined to give up smoking.  In fact she has an appointment to see another healthcare provider for smoking cessation therapy today.  I congratulated her on her efforts    She is also a longtime diabetic.  She had ABIs for evaluation of leg fatigue and these only just mildly abnormal.  Indeed I do not find any evidence of ischemia in her legs.  Her pulses are reasonably well-preserved.    She denies any recent fevers or COVID-like symptoms she can walk on the flat for an hour or 2 but does feel exhausted after that.  Last week she felt extremely short of breath going up the stairs.  She measured her blood pressure and it was at least 200 systolic with diastolics in 1 20-1 30.  At the same time she felt chest pressure when her blood pressure was high.  She felt dizzy as well.  she had a normal stress nuclear study in 2019.  Under other circumstances she does not have exertional chest discomfort.  She does not have PND orthopnea ankle swelling.    Body mass index is 43.  Denies history of alcohol or drug use.  She does say she has chronic severe anxiety but not worsened recently.    I think this lady's symptoms may be due to severe physical deconditioning however blood pressure increase is traumatic.  I do see that her blood pressure is excellent today at rest perhaps  anxiety may play a part as well and her blood pressure increases.  I think rare condition such as pheochromocytoma probably unlikely.  I will give her a prescription of amlodipine to take as required when her blood pressure is high.    As she does have multiple cardiac risk factors I will request a stress nuclear study for evaluation of her shortness of breath associated with chest discomfort and hypertension.    She will follow-up with Lesley Morales who she has seen before and with Dr. Tate if necessary.  No orders of the defined types were placed in this encounter.      No orders of the defined types were placed in this encounter.      Encounter Diagnoses   Name Primary?     SOB (shortness of breath) Yes     Precordial pain        CURRENT MEDICATIONS:  Current Outpatient Medications   Medication Sig Dispense Refill     albuterol (PROVENTIL) (2.5 MG/3ML) 0.083% neb solution Take 1 vial (2.5 mg) by nebulization every 6 hours as needed for shortness of breath / dyspnea or wheezing 25 vial 3     albuterol (VENTOLIN HFA) 108 (90 Base) MCG/ACT inhaler Inhale 2 puffs into the lungs every 6 hours 3 each 3     atorvastatin (LIPITOR) 80 MG tablet TAKE ONE TABLET BY MOUTH EVERY DAY 90 tablet 0     benzonatate (TESSALON) 200 MG capsule Take 1 capsule (200 mg) by mouth 3 times daily as needed for cough 60 capsule 1     benzoyl peroxide (ACNE-CLEAR) 10 % external gel Apply topically 2 times daily as needed       blood glucose (ACCU-CHEK ALLYSON PLUS) test strip USE TO TEST BLOOD SUGAR ONCE DAILY OR AS DIRECTED 100 strip 3     blood glucose (NO BRAND SPECIFIED) lancets standard Use to test blood sugar 1 times daily or as directed. 100 each 3     blood glucose (NO BRAND SPECIFIED) lancets standard Use to test blood sugar 1 time daily 100 each 1     blood glucose (NO BRAND SPECIFIED) test strip Use to test blood sugar 1 times daily or as directed.  Dispense Accu-chek Allyson Plus or per patient insurance 100 strip 3     blood glucose  monitoring (NO BRAND SPECIFIED) meter device kit Use to test blood sugar 1 times daily or as directed.  Dispense Accu-chek Olinda Plus or per insurance preference 1 kit 0     blood glucose monitoring (NO BRAND SPECIFIED) meter device kit Use to test blood sugar 1 times daily or as directed. 1 kit 0     budesonide-formoterol (SYMBICORT) 160-4.5 MCG/ACT Inhaler Inhale 2 puffs into the lungs 2 times daily 30.6 g 3     buPROPion (WELLBUTRIN XL) 150 MG 24 hr tablet TAKE ONE TABLET BY MOUTH EVERY MORNING 90 tablet 2     cetirizine HCl 10 MG CAPS Take 1 capsule (10 mg) by mouth daily as needed Takes in the spring. 90 capsule 3     clindamycin 1 % EX external gel Apply topically 2 times daily 60 g 3     clonazePAM (KLONOPIN) 1 MG tablet Take 1 tablet (1 mg) by mouth 2 times daily as needed for anxiety She needs to see her PCP to get more tablets 5 tablet 0     fluticasone (FLONASE) 50 MCG/ACT nasal spray Spray 2 sprays in nostril daily 16 g 5     Fluticasone-Umeclidin-Vilanterol (TRELEGY ELLIPTA) 100-62.5-25 MCG/INH oral inhaler Inhale 1 puff into the lungs daily 28 each 11     furosemide (LASIX) 20 MG tablet Take 1 tablet (20 mg) by mouth 2 times daily 180 tablet 0     glipiZIDE (GLUCOTROL XL) 2.5 MG 24 hr tablet TAKE ONE TABLET BY MOUTH EVERY DAY 90 tablet 1     hydrocortisone 2.5 % cream APPLY TOPICALLY TO THE AFFECTED AREA(S) TWO TIMES A DAY 30 g 1     hydrOXYzine (ATARAX) 50 MG tablet TAKE TWO TABLETS BY MOUTH THREE TIMES A DAY AS NEEDED FOR ANXIETY. 70 tablet 1     lamoTRIgine (LAMICTAL) 150 MG tablet Take 150 mg by mouth daily       lisinopril (ZESTRIL) 20 MG tablet TAKE ONE TABLET BY MOUTH EVERY DAY 90 tablet 0     metFORMIN (GLUCOPHAGE) 500 MG tablet TAKE ONE TABLET BY MOUTH EVERY DAY WITH BREAKFAST 90 tablet 1     montelukast (SINGULAIR) 10 MG tablet TAKE ONE TABLET BY MOUTH AT BEDTIME 90 tablet 0     nitroGLYcerin (NITROSTAT) 0.4 MG sublingual tablet PLACE ONE TABLET UNDER THE TONGUE AT THE 1ST SIGN OF ATTACK. IF  PAIN IS UNRELIEVED OR WORSENED 5 MINUTES AFTER 1ST DOSE, PROMPT MEDICAL ASSISTANCE IS NEEDED. MAY REPEAT EVERY 5 MINUTES UNTIL PAIN IS RELIEVED. MAX OF THREE DOSES 25 tablet 11     nystatin (MYCOSTATIN) 280903 UNIT/GM external powder APPLY TOPICALLY TWICE A DAY AS NEEDED SKIN RASH 45 g 3     nystatin (MYCOSTATIN) 996941 UNIT/ML suspension TAKE 5 ML BY MOUTH FOUR TIMES A  mL 0     omeprazole (PRILOSEC) 40 MG DR capsule TAKE ONE CAPSULE BY MOUTH TWICE A  capsule 0     ondansetron (ZOFRAN) 8 MG tablet TAKE ONE-HALF TO ONE TABLET BY MOUTH EVERY 8 HOURS AS NEEDED FOR NAUSEA 30 tablet 3     polyethylene glycol (MIRALAX) 17 GM/Dose powder Take 17 g (1 capful) by mouth daily 507 g 1     prochlorperazine (COMPAZINE) 10 MG tablet Take 1 tablet (10 mg) by mouth every 8 hours as needed for nausea or vomiting 30 tablet 0     SM MUCUS RELIEF 600 MG 12 hr tablet TAKE TWO TABLET BY MOUTH TWO TIMES DAILY 30 tablet 0     sulfamethoxazole-trimethoprim (BACTRIM DS) 800-160 MG tablet Take 1 tablet by mouth 2 times daily Take one tablet twice daily. For additional refills, please schedule a follow-up appointment. 180 tablet 1     terbinafine (LAMISIL) 1 % external cream APPLY TO AFFECTED AREA(S) TWO TIMES A DAY 30 g 2     VENTOLIN  (90 Base) MCG/ACT inhaler INHALE TWO PUFFS BY MOUTH EVERY 6 HOURS AS NEEDED FOR SHORTNESS OF BREATH 18 g 1     DULoxetine (CYMBALTA) 20 MG capsule Take 1 capsule (20 mg) by mouth daily X 7 days and if well tolerated increase to 40 mg daily. (Patient not taking: Reported on 1/10/2022) 53 capsule 1     nicotine (NICODERM CQ) 14 MG/24HR 24 hr patch Place 1 patch onto the skin every 24 hours (Patient not taking: Reported on 1/10/2022) 28 patch 1     omeprazole (PRILOSEC) 20 MG DR capsule TAKE ONE CAPSULE BY MOUTH TWO TIMES A DAY (Patient not taking: Reported on 1/10/2022) 180 capsule 1     ondansetron (ZOFRAN-ODT) 4 MG ODT tab Take 1 tablet (4 mg) by mouth every 6 hours as needed for nausea  "(Patient not taking: Reported on 1/10/2022) 12 tablet 1       ALLERGIES     Allergies   Allergen Reactions     Codeine Nausea and Vomiting     vomiting     Cyclobenzaprine Nausea     Gabapentin Rash     Hydrocodone Nausea and Vomiting     Sulfa Drugs Nausea and Vomiting     Nausea       PAST MEDICAL HISTORY:  Past Medical History:   Diagnosis Date     Anxiety state, unspecified      Asthma      Chronic pain     back pain from cyst     Contact dermatitis and other eczema, due to unspecified cause      COPD (chronic obstructive pulmonary disease) (H)      Depressive disorder, not elsewhere classified      Diabetes (H)      Emphysema with chronic bronchitis (H)      Esophageal reflux      Family history of ischemic heart disease      Fibromyalgia      Gastro-oesophageal reflux disease      Heart disease     has chest pain sometimes     History of emphysema      Hoarseness      HTN, goal below 140/90      Hyperlipidemia LDL goal <130      Liver disease     \"fatty liver\"     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)      PONV (postoperative nausea and vomiting)      Rectal bleeding        PAST SURGICAL HISTORY:  Past Surgical History:   Procedure Laterality Date     ANESTHESIA OUT OF OR MRI N/A 10/7/2021    Procedure: ANESTHESIA OUT OF OR MRI;  Surgeon: GENERIC ANESTHESIA PROVIDER;  Location: [facility] OR     ARTHROSCOPY SHOULDER DECOMPRESSION Left 10/21/2015    Procedure: ARTHROSCOPY SHOULDER DECOMPRESSION;  Surgeon: Julien Milian MD;  Location: [facility] OR     BIOPSY ARTERY TEMPORAL Left 3/11/2020    Procedure: LEFT TEMPORAL ARTERY BIOPSY;  Surgeon: Ibeth Conner MD;  Location: [facility] OR     BRONCHIAL THERMOPLASTY N/A 11/14/2014    Procedure: BRONCHIAL THERMOPLASTY;  Surgeon: Ward Whitaker MD;  Location: [facility] GI     BRONCHIAL THERMOPLASTY N/A 12/19/2014    Procedure: BRONCHIAL THERMOPLASTY;  Surgeon: Ward Whitaker MD;  Location: [facility] OR     BRONCHIAL THERMOPLASTY N/A 2/6/2015    Procedure: BRONCHIAL " THERMOPLASTY;  Surgeon: Ward Whitaker MD;  Location: [facility] OR     COLONOSCOPY N/A 11/26/2018    Procedure: COLONOSCOPY ([facility]);  Surgeon: Marshall Oakes MD;  Location:  OR     COLONOSCOPY N/A 10/22/2020    Procedure: Colonoscopy;  Surgeon: Marshall Mccormick MD;  Location: [facility] OR     DISCECTOMY, FUSION CERVICAL ANTERIOR ONE LEVEL, COMBINED N/A 5/1/2018    Procedure: COMBINED DISCECTOMY, FUSION CERVICAL ANTERIOR ONE LEVEL;  1.  C5-C6 anterior cervical diskectomy and fusion.    2.  C5-C6 application of intervertebral biomechanical device for interbody fusion purposes.    3.  C5-C6 anterior instrumentation using the standard Kristin InViZia 24 mm plate with four associated bone screws, 12 mm in length.  Inferior screws are fixed.  Superior screws are va     ENT SURGERY  2015    polyps removed from vocal cords      ESOPHAGOSCOPY, GASTROSCOPY, DUODENOSCOPY (EGD), COMBINED N/A 10/22/2020    Procedure: Esophagoscopy, gastroscopy, duodenoscopy with biopsies;  Surgeon: Marshall Mccormick MD;  Location:  OR     ESOPHAGOSCOPY, GASTROSCOPY, DUODENOSCOPY (EGD), COMBINED N/A 6/17/2021    Procedure: ESOPHAGOGASTRODUODENOSCOPY, WITH BIOPSY;  Surgeon: Darrel Damon MD;  Location:  GI     EXCISE MASS TRUNK Left 11/3/2021    Procedure: EXCISION LEFT SHOULDER LIPOMA;  Surgeon: Ibeth Conner MD;  Location:  OR     EXCISE NODE MEDIASTINAL  4/26/2013    Procedure: EXCISE NODE MEDIASTINAL;;  Surgeon: Av Peña MD;  Location:  OR     LAPAROSCOPIC CHOLECYSTECTOMY N/A 10/19/2017    Procedure: LAPAROSCOPIC CHOLECYSTECTOMY;  LAPAROSCOPIC CHOLECYSTECTOMY;  Surgeon: Ney Jerry MD;  Location:  OR     THORACOSCOPY  4/26/2013    Procedure: THORACOSCOPY;  LEFT VIDEO ASSISTED THORACOSCOPY, RESECTION OF POSTERIOR MEDIASTINAL MASS;  Surgeon: Av Peña MD;  Location:  OR     TONSILLECTOMY  as a kid     TONSILLECTOMY       ZZC APPENDECTOMY  at age 17       FAMILY HISTORY:  Family  "History   Problem Relation Age of Onset     Heart Disease Mother         murmur, mi     Hypertension Mother      Cerebrovascular Disease Mother      Depression Mother      Gallbladder Disease Mother      Asthma Mother      Family History Negative Father         does not know  him     Family History Negative Brother      Heart Disease Maternal Grandfather      Asthma Maternal Grandfather      Hypertension Maternal Grandfather      Cerebrovascular Disease Maternal Grandfather      Diabetes Maternal Grandfather      Asthma Sister      Hypertension Sister      Cerebrovascular Disease Sister      Hypertension Maternal Grandmother      Cerebrovascular Disease Maternal Grandmother        SOCIAL HISTORY:  Social History     Socioeconomic History     Marital status:      Spouse name: None     Number of children: None     Years of education: None     Highest education level: None   Occupational History     None   Tobacco Use     Smoking status: Current Every Day Smoker     Packs/day: 0.50     Years: 30.00     Pack years: 15.00     Types: Cigarettes     Start date: 1/1/1985     Smokeless tobacco: Never Used     Tobacco comment: smokes 6 cigarettes a day - in the process of trying to quit   Vaping Use     Vaping Use: Never used   Substance and Sexual Activity     Alcohol use: Not Currently     Comment: \"once in a blue moon\", none for five months     Drug use: No     Sexual activity: Not Currently     Partners: Male     Birth control/protection: None   Other Topics Concern      Service Not Asked     Blood Transfusions Not Asked     Caffeine Concern No     Comment: 4-5 cans of pop daily     Occupational Exposure Not Asked     Hobby Hazards Not Asked     Sleep Concern Not Asked     Stress Concern Not Asked     Weight Concern Not Asked     Special Diet No     Back Care Not Asked     Exercise No     Comment: on hold     Bike Helmet Not Asked     Seat Belt Not Asked     Self-Exams Not Asked     Parent/sibling w/ CABG, " "MI or angioplasty before 65F 55M? Not Asked   Social History Narrative     None     Social Determinants of Health     Financial Resource Strain: Not on file   Food Insecurity: Not on file   Transportation Needs: Not on file   Physical Activity: Not on file   Stress: Not on file   Social Connections: Not on file   Intimate Partner Violence: Not on file   Housing Stability: Not on file       Review of Systems:  Skin:  Positive for     Eyes:  Negative    ENT:  Negative    Respiratory:  Positive for dyspnea on exertion;cough;wheezing  Cardiovascular:    Positive for;palpitations;fatigue;exercise intolerance;lightheadedness;edema  Gastroenterology: Positive for abdominal pain  Genitourinary:  Negative    Musculoskeletal:  Positive for neck pain;back pain  Neurologic:  Positive for headaches;numbness or tingling of feet;numbness or tingling of hands  Psychiatric:  Positive for anxiety;excessive stress  Heme/Lymph/Imm:  Positive for allergies  Endocrine:  Positive for diabetes    Physical Exam:  Vitals: /72   Pulse 72   Ht 1.6 m (5' 3\")   Wt 112 kg (247 lb)   LMP 04/15/2016 (Exact Date)   BMI 43.75 kg/m      Constitutional:           Skin:             Head:           Eyes:           Lymph:      ENT:           Neck:           Respiratory:            Cardiac:                                                           GI:           Extremities and Muscular Skeletal:                 Neurological:           Psych:           Recent Lab Results:  LIPID RESULTS:  Lab Results   Component Value Date    CHOL 167 02/20/2020    HDL 35 (L) 02/20/2020    LDL 96 02/20/2020    TRIG 180 (H) 02/20/2020    CHOLHDLRATIO 4.9 05/15/2015       LIVER ENZYME RESULTS:  Lab Results   Component Value Date    AST 21 05/22/2021    ALT 80 (H) 05/22/2021       CBC RESULTS:  Lab Results   Component Value Date    WBC 12.5 (H) 11/12/2021    WBC 10.0 05/22/2021    RBC 5.10 11/12/2021    RBC 4.70 05/22/2021    HGB 13.6 11/12/2021    HGB 12.4 " 05/22/2021    HCT 44.4 11/12/2021    HCT 38.9 05/22/2021    MCV 87 11/12/2021    MCV 83 05/22/2021    MCH 26.7 11/12/2021    MCH 26.4 (L) 05/22/2021    MCHC 30.6 (L) 11/12/2021    MCHC 31.9 05/22/2021    RDW 17.0 (H) 11/12/2021    RDW 16.8 (H) 05/22/2021     11/12/2021     05/22/2021       BMP RESULTS:  Lab Results   Component Value Date     11/12/2021     05/22/2021    POTASSIUM 4.7 11/12/2021    POTASSIUM 3.6 05/22/2021    CHLORIDE 103 11/12/2021    CHLORIDE 108 05/22/2021    CO2 28 11/12/2021    CO2 26 05/22/2021    ANIONGAP 7 11/12/2021    ANIONGAP 8 05/22/2021    GLC 87 11/12/2021    GLC 75 05/22/2021    BUN 23 11/12/2021    BUN 14 05/22/2021    CR 1.03 11/12/2021    CR 1.01 05/22/2021    GFRESTIMATED 63 11/12/2021    GFRESTIMATED 64 05/22/2021    GFRESTBLACK 74 05/22/2021    GENE 8.8 11/12/2021    GENE 8.9 05/22/2021        A1C RESULTS:  Lab Results   Component Value Date    A1C 5.6 10/16/2020       INR RESULTS:  Lab Results   Component Value Date    INR 0.99 11/20/2020    INR 0.94 09/29/2020           DR TACHO KHAN MD   [facility]

## 2022-01-10 NOTE — PROGRESS NOTES
HPI and Plan:   At the pleasure of seeing this 51-year-old lady for evaluation of shortness of breath and hypertension.    She has seen several my colleagues previously but does identify Dr. Tate as her primary cardiologist    Reading the excellent notes understand that this is a lady who has emphysema.  She continues to smoke and smells strongly of cigarettes today.  However she has a prescription for nicotine patch and she is determined to give up smoking.  In fact she has an appointment to see another healthcare provider for smoking cessation therapy today.  I congratulated her on her efforts    She is also a longtime diabetic.  She had ABIs for evaluation of leg fatigue and these only just mildly abnormal.  Indeed I do not find any evidence of ischemia in her legs.  Her pulses are reasonably well-preserved.    She denies any recent fevers or COVID-like symptoms she can walk on the flat for an hour or 2 but does feel exhausted after that.  Last week she felt extremely short of breath going up the stairs.  She measured her blood pressure and it was at least 200 systolic with diastolics in 1 20-1 30.  At the same time she felt chest pressure when her blood pressure was high.  She felt dizzy as well.  she had a normal stress nuclear study in 2019.  Under other circumstances she does not have exertional chest discomfort.  She does not have PND orthopnea ankle swelling.    Body mass index is 43.  Denies history of alcohol or drug use.  She does say she has chronic severe anxiety but not worsened recently.    I think this lady's symptoms may be due to severe physical deconditioning however blood pressure increase is traumatic.  I do see that her blood pressure is excellent today at rest perhaps anxiety may play a part as well and her blood pressure increases.  I think rare condition such as pheochromocytoma probably unlikely.  I will give her a prescription of amlodipine to take as required when her blood pressure is  high.    As she does have multiple cardiac risk factors I will request a stress nuclear study for evaluation of her shortness of breath associated with chest discomfort and hypertension.    She will follow-up with Lesley Morales who she has seen before and with Dr. Tate if necessary.  No orders of the defined types were placed in this encounter.      No orders of the defined types were placed in this encounter.      Encounter Diagnoses   Name Primary?     SOB (shortness of breath) Yes     Precordial pain        CURRENT MEDICATIONS:  Current Outpatient Medications   Medication Sig Dispense Refill     albuterol (PROVENTIL) (2.5 MG/3ML) 0.083% neb solution Take 1 vial (2.5 mg) by nebulization every 6 hours as needed for shortness of breath / dyspnea or wheezing 25 vial 3     albuterol (VENTOLIN HFA) 108 (90 Base) MCG/ACT inhaler Inhale 2 puffs into the lungs every 6 hours 3 each 3     atorvastatin (LIPITOR) 80 MG tablet TAKE ONE TABLET BY MOUTH EVERY DAY 90 tablet 0     benzonatate (TESSALON) 200 MG capsule Take 1 capsule (200 mg) by mouth 3 times daily as needed for cough 60 capsule 1     benzoyl peroxide (ACNE-CLEAR) 10 % external gel Apply topically 2 times daily as needed       blood glucose (ACCU-CHEK ALLYSON PLUS) test strip USE TO TEST BLOOD SUGAR ONCE DAILY OR AS DIRECTED 100 strip 3     blood glucose (NO BRAND SPECIFIED) lancets standard Use to test blood sugar 1 times daily or as directed. 100 each 3     blood glucose (NO BRAND SPECIFIED) lancets standard Use to test blood sugar 1 time daily 100 each 1     blood glucose (NO BRAND SPECIFIED) test strip Use to test blood sugar 1 times daily or as directed.  Dispense Accu-chek Allyson Plus or per patient insurance 100 strip 3     blood glucose monitoring (NO BRAND SPECIFIED) meter device kit Use to test blood sugar 1 times daily or as directed.  Dispense Accu-chek Allyson Plus or per insurance preference 1 kit 0     blood glucose monitoring (NO BRAND SPECIFIED) meter  device kit Use to test blood sugar 1 times daily or as directed. 1 kit 0     budesonide-formoterol (SYMBICORT) 160-4.5 MCG/ACT Inhaler Inhale 2 puffs into the lungs 2 times daily 30.6 g 3     buPROPion (WELLBUTRIN XL) 150 MG 24 hr tablet TAKE ONE TABLET BY MOUTH EVERY MORNING 90 tablet 2     cetirizine HCl 10 MG CAPS Take 1 capsule (10 mg) by mouth daily as needed Takes in the spring. 90 capsule 3     clindamycin 1 % EX external gel Apply topically 2 times daily 60 g 3     clonazePAM (KLONOPIN) 1 MG tablet Take 1 tablet (1 mg) by mouth 2 times daily as needed for anxiety She needs to see her PCP to get more tablets 5 tablet 0     fluticasone (FLONASE) 50 MCG/ACT nasal spray Spray 2 sprays in nostril daily 16 g 5     Fluticasone-Umeclidin-Vilanterol (TRELEGY ELLIPTA) 100-62.5-25 MCG/INH oral inhaler Inhale 1 puff into the lungs daily 28 each 11     furosemide (LASIX) 20 MG tablet Take 1 tablet (20 mg) by mouth 2 times daily 180 tablet 0     glipiZIDE (GLUCOTROL XL) 2.5 MG 24 hr tablet TAKE ONE TABLET BY MOUTH EVERY DAY 90 tablet 1     hydrocortisone 2.5 % cream APPLY TOPICALLY TO THE AFFECTED AREA(S) TWO TIMES A DAY 30 g 1     hydrOXYzine (ATARAX) 50 MG tablet TAKE TWO TABLETS BY MOUTH THREE TIMES A DAY AS NEEDED FOR ANXIETY. 70 tablet 1     lamoTRIgine (LAMICTAL) 150 MG tablet Take 150 mg by mouth daily       lisinopril (ZESTRIL) 20 MG tablet TAKE ONE TABLET BY MOUTH EVERY DAY 90 tablet 0     metFORMIN (GLUCOPHAGE) 500 MG tablet TAKE ONE TABLET BY MOUTH EVERY DAY WITH BREAKFAST 90 tablet 1     montelukast (SINGULAIR) 10 MG tablet TAKE ONE TABLET BY MOUTH AT BEDTIME 90 tablet 0     nitroGLYcerin (NITROSTAT) 0.4 MG sublingual tablet PLACE ONE TABLET UNDER THE TONGUE AT THE 1ST SIGN OF ATTACK. IF PAIN IS UNRELIEVED OR WORSENED 5 MINUTES AFTER 1ST DOSE, PROMPT MEDICAL ASSISTANCE IS NEEDED. MAY REPEAT EVERY 5 MINUTES UNTIL PAIN IS RELIEVED. MAX OF THREE DOSES 25 tablet 11     nystatin (MYCOSTATIN) 584808 UNIT/GM external  powder APPLY TOPICALLY TWICE A DAY AS NEEDED SKIN RASH 45 g 3     nystatin (MYCOSTATIN) 831284 UNIT/ML suspension TAKE 5 ML BY MOUTH FOUR TIMES A  mL 0     omeprazole (PRILOSEC) 40 MG DR capsule TAKE ONE CAPSULE BY MOUTH TWICE A  capsule 0     ondansetron (ZOFRAN) 8 MG tablet TAKE ONE-HALF TO ONE TABLET BY MOUTH EVERY 8 HOURS AS NEEDED FOR NAUSEA 30 tablet 3     polyethylene glycol (MIRALAX) 17 GM/Dose powder Take 17 g (1 capful) by mouth daily 507 g 1     prochlorperazine (COMPAZINE) 10 MG tablet Take 1 tablet (10 mg) by mouth every 8 hours as needed for nausea or vomiting 30 tablet 0     SM MUCUS RELIEF 600 MG 12 hr tablet TAKE TWO TABLET BY MOUTH TWO TIMES DAILY 30 tablet 0     sulfamethoxazole-trimethoprim (BACTRIM DS) 800-160 MG tablet Take 1 tablet by mouth 2 times daily Take one tablet twice daily. For additional refills, please schedule a follow-up appointment. 180 tablet 1     terbinafine (LAMISIL) 1 % external cream APPLY TO AFFECTED AREA(S) TWO TIMES A DAY 30 g 2     VENTOLIN  (90 Base) MCG/ACT inhaler INHALE TWO PUFFS BY MOUTH EVERY 6 HOURS AS NEEDED FOR SHORTNESS OF BREATH 18 g 1     DULoxetine (CYMBALTA) 20 MG capsule Take 1 capsule (20 mg) by mouth daily X 7 days and if well tolerated increase to 40 mg daily. (Patient not taking: Reported on 1/10/2022) 53 capsule 1     nicotine (NICODERM CQ) 14 MG/24HR 24 hr patch Place 1 patch onto the skin every 24 hours (Patient not taking: Reported on 1/10/2022) 28 patch 1     omeprazole (PRILOSEC) 20 MG DR capsule TAKE ONE CAPSULE BY MOUTH TWO TIMES A DAY (Patient not taking: Reported on 1/10/2022) 180 capsule 1     ondansetron (ZOFRAN-ODT) 4 MG ODT tab Take 1 tablet (4 mg) by mouth every 6 hours as needed for nausea (Patient not taking: Reported on 1/10/2022) 12 tablet 1       ALLERGIES     Allergies   Allergen Reactions     Codeine Nausea and Vomiting     vomiting     Cyclobenzaprine Nausea     Gabapentin Rash     Hydrocodone Nausea and  "Vomiting     Sulfa Drugs Nausea and Vomiting     Nausea       PAST MEDICAL HISTORY:  Past Medical History:   Diagnosis Date     Anxiety state, unspecified      Asthma      Chronic pain     back pain from cyst     Contact dermatitis and other eczema, due to unspecified cause      COPD (chronic obstructive pulmonary disease) (H)      Depressive disorder, not elsewhere classified      Diabetes (H)      Emphysema with chronic bronchitis (H)      Esophageal reflux      Family history of ischemic heart disease      Fibromyalgia      Gastro-oesophageal reflux disease      Heart disease     has chest pain sometimes     History of emphysema      Hoarseness      HTN, goal below 140/90      Hyperlipidemia LDL goal <130      Liver disease     \"fatty liver\"     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)      PONV (postoperative nausea and vomiting)      Rectal bleeding        PAST SURGICAL HISTORY:  Past Surgical History:   Procedure Laterality Date     ANESTHESIA OUT OF OR MRI N/A 10/7/2021    Procedure: ANESTHESIA OUT OF OR MRI;  Surgeon: GENERIC ANESTHESIA PROVIDER;  Location: [facility] OR     ARTHROSCOPY SHOULDER DECOMPRESSION Left 10/21/2015    Procedure: ARTHROSCOPY SHOULDER DECOMPRESSION;  Surgeon: Julien Milian MD;  Location: [facility] OR     BIOPSY ARTERY TEMPORAL Left 3/11/2020    Procedure: LEFT TEMPORAL ARTERY BIOPSY;  Surgeon: Ibeth Conner MD;  Location: [facility]     BRONCHIAL THERMOPLASTY N/A 11/14/2014    Procedure: BRONCHIAL THERMOPLASTY;  Surgeon: Ward Whitaker MD;  Location: [facility]     BRONCHIAL THERMOPLASTY N/A 12/19/2014    Procedure: BRONCHIAL THERMOPLASTY;  Surgeon: Ward Whitaker MD;  Location: [facility]     BRONCHIAL THERMOPLASTY N/A 2/6/2015    Procedure: BRONCHIAL THERMOPLASTY;  Surgeon: Ward Whitaker MD;  Location: [facility]     COLONOSCOPY N/A 11/26/2018    Procedure: COLONOSCOPY ([facility]);  Surgeon: Marshall Oakes MD;  Location: [facility]     COLONOSCOPY N/A 10/22/2020    Procedure: " Colonoscopy;  Surgeon: Marshall Mccormick MD;  Location:  OR     DISCECTOMY, FUSION CERVICAL ANTERIOR ONE LEVEL, COMBINED N/A 5/1/2018    Procedure: COMBINED DISCECTOMY, FUSION CERVICAL ANTERIOR ONE LEVEL;  1.  C5-C6 anterior cervical diskectomy and fusion.    2.  C5-C6 application of intervertebral biomechanical device for interbody fusion purposes.    3.  C5-C6 anterior instrumentation using the standard Kristin InViZia 24 mm plate with four associated bone screws, 12 mm in length.  Inferior screws are fixed.  Superior screws are va     ENT SURGERY  2015    polyps removed from vocal cords      ESOPHAGOSCOPY, GASTROSCOPY, DUODENOSCOPY (EGD), COMBINED N/A 10/22/2020    Procedure: Esophagoscopy, gastroscopy, duodenoscopy with biopsies;  Surgeon: Mrashall Mccormick MD;  Location:  OR     ESOPHAGOSCOPY, GASTROSCOPY, DUODENOSCOPY (EGD), COMBINED N/A 6/17/2021    Procedure: ESOPHAGOGASTRODUODENOSCOPY, WITH BIOPSY;  Surgeon: Darrel Damon MD;  Location:  GI     EXCISE MASS TRUNK Left 11/3/2021    Procedure: EXCISION LEFT SHOULDER LIPOMA;  Surgeon: Ibeth Conner MD;  Location:  OR     EXCISE NODE MEDIASTINAL  4/26/2013    Procedure: EXCISE NODE MEDIASTINAL;;  Surgeon: Av Peña MD;  Location:  OR     LAPAROSCOPIC CHOLECYSTECTOMY N/A 10/19/2017    Procedure: LAPAROSCOPIC CHOLECYSTECTOMY;  LAPAROSCOPIC CHOLECYSTECTOMY;  Surgeon: Ney Jerry MD;  Location:  OR     THORACOSCOPY  4/26/2013    Procedure: THORACOSCOPY;  LEFT VIDEO ASSISTED THORACOSCOPY, RESECTION OF POSTERIOR MEDIASTINAL MASS;  Surgeon: Av Peña MD;  Location:  OR     TONSILLECTOMY  as a kid     TONSILLECTOMY       ZZC APPENDECTOMY  at age 17       FAMILY HISTORY:  Family History   Problem Relation Age of Onset     Heart Disease Mother         murmur, mi     Hypertension Mother      Cerebrovascular Disease Mother      Depression Mother      Gallbladder Disease Mother      Asthma Mother      Family  "History Negative Father         does not know  him     Family History Negative Brother      Heart Disease Maternal Grandfather      Asthma Maternal Grandfather      Hypertension Maternal Grandfather      Cerebrovascular Disease Maternal Grandfather      Diabetes Maternal Grandfather      Asthma Sister      Hypertension Sister      Cerebrovascular Disease Sister      Hypertension Maternal Grandmother      Cerebrovascular Disease Maternal Grandmother        SOCIAL HISTORY:  Social History     Socioeconomic History     Marital status:      Spouse name: None     Number of children: None     Years of education: None     Highest education level: None   Occupational History     None   Tobacco Use     Smoking status: Current Every Day Smoker     Packs/day: 0.50     Years: 30.00     Pack years: 15.00     Types: Cigarettes     Start date: 1/1/1985     Smokeless tobacco: Never Used     Tobacco comment: smokes 6 cigarettes a day - in the process of trying to quit   Vaping Use     Vaping Use: Never used   Substance and Sexual Activity     Alcohol use: Not Currently     Comment: \"once in a blue moon\", none for five months     Drug use: No     Sexual activity: Not Currently     Partners: Male     Birth control/protection: None   Other Topics Concern      Service Not Asked     Blood Transfusions Not Asked     Caffeine Concern No     Comment: 4-5 cans of pop daily     Occupational Exposure Not Asked     Hobby Hazards Not Asked     Sleep Concern Not Asked     Stress Concern Not Asked     Weight Concern Not Asked     Special Diet No     Back Care Not Asked     Exercise No     Comment: on hold     Bike Helmet Not Asked     Seat Belt Not Asked     Self-Exams Not Asked     Parent/sibling w/ CABG, MI or angioplasty before 65F 55M? Not Asked   Social History Narrative     None     Social Determinants of Health     Financial Resource Strain: Not on file   Food Insecurity: Not on file   Transportation Needs: Not on file " "  Physical Activity: Not on file   Stress: Not on file   Social Connections: Not on file   Intimate Partner Violence: Not on file   Housing Stability: Not on file       Review of Systems:  Skin:  Positive for     Eyes:  Negative    ENT:  Negative    Respiratory:  Positive for dyspnea on exertion;cough;wheezing  Cardiovascular:    Positive for;palpitations;fatigue;exercise intolerance;lightheadedness;edema  Gastroenterology: Positive for abdominal pain  Genitourinary:  Negative    Musculoskeletal:  Positive for neck pain;back pain  Neurologic:  Positive for headaches;numbness or tingling of feet;numbness or tingling of hands  Psychiatric:  Positive for anxiety;excessive stress  Heme/Lymph/Imm:  Positive for allergies  Endocrine:  Positive for diabetes    Physical Exam:  Vitals: /72   Pulse 72   Ht 1.6 m (5' 3\")   Wt 112 kg (247 lb)   LMP 04/15/2016 (Exact Date)   BMI 43.75 kg/m      Constitutional:           Skin:             Head:           Eyes:           Lymph:      ENT:           Neck:           Respiratory:            Cardiac:                                                           GI:           Extremities and Muscular Skeletal:                 Neurological:           Psych:           Recent Lab Results:  LIPID RESULTS:  Lab Results   Component Value Date    CHOL 167 02/20/2020    HDL 35 (L) 02/20/2020    LDL 96 02/20/2020    TRIG 180 (H) 02/20/2020    CHOLHDLRATIO 4.9 05/15/2015       LIVER ENZYME RESULTS:  Lab Results   Component Value Date    AST 21 05/22/2021    ALT 80 (H) 05/22/2021       CBC RESULTS:  Lab Results   Component Value Date    WBC 12.5 (H) 11/12/2021    WBC 10.0 05/22/2021    RBC 5.10 11/12/2021    RBC 4.70 05/22/2021    HGB 13.6 11/12/2021    HGB 12.4 05/22/2021    HCT 44.4 11/12/2021    HCT 38.9 05/22/2021    MCV 87 11/12/2021    MCV 83 05/22/2021    MCH 26.7 11/12/2021    MCH 26.4 (L) 05/22/2021    MCHC 30.6 (L) 11/12/2021    MCHC 31.9 05/22/2021    RDW 17.0 (H) 11/12/2021    " RDW 16.8 (H) 05/22/2021     11/12/2021     05/22/2021       BMP RESULTS:  Lab Results   Component Value Date     11/12/2021     05/22/2021    POTASSIUM 4.7 11/12/2021    POTASSIUM 3.6 05/22/2021    CHLORIDE 103 11/12/2021    CHLORIDE 108 05/22/2021    CO2 28 11/12/2021    CO2 26 05/22/2021    ANIONGAP 7 11/12/2021    ANIONGAP 8 05/22/2021    GLC 87 11/12/2021    GLC 75 05/22/2021    BUN 23 11/12/2021    BUN 14 05/22/2021    CR 1.03 11/12/2021    CR 1.01 05/22/2021    GFRESTIMATED 63 11/12/2021    GFRESTIMATED 64 05/22/2021    GFRESTBLACK 74 05/22/2021    GENE 8.8 11/12/2021    GENE 8.9 05/22/2021        A1C RESULTS:  Lab Results   Component Value Date    A1C 5.6 10/16/2020       INR RESULTS:  Lab Results   Component Value Date    INR 0.99 11/20/2020    INR 0.94 09/29/2020           CC  No referring provider defined for this encounter.

## 2022-01-11 RX ORDER — BUPROPION HYDROCHLORIDE 150 MG/1
TABLET ORAL
Qty: 90 TABLET | Refills: 2 | Status: SHIPPED | OUTPATIENT
Start: 2022-01-11 | End: 2022-10-27

## 2022-01-18 NOTE — PROGRESS NOTES
"Swetha Patterson is a 51 year old female who is being evaluated via a billable video visit.      The patient has been notified of following:     \"This video visit will be conducted via a call between you and your physician/provider. We have found that certain health care needs can be provided without the need for an in-person physical exam.  This service lets us provide the care you need with a video conversation.  If a prescription is necessary we can send it directly to your pharmacy.  If lab work is needed we can place an order for that and you can then stop by our lab to have the test done at a later time.    Video visits are billed at different rates depending on your insurance coverage.  Please reach out to your insurance provider with any questions.    If during the course of the call the physician/provider feels a video visit is not appropriate, you will not be charged for this service.\"    How would you like to obtain your AVS? MyChart  If the video visit is dropped, the invitation should be resent by: Text to cell phone: [phone]  Will anyone else be joining your video visit? No      Video-Visit Details    Type of service:  Video Visit    Video Start Time: 10:21 am   Video End Time: 11:08 am    Originating Location (pt. Location): Home    Distant Location (provider location):  [facility] [place]     Platform used for Video Visit: [employer]    During this virtual visit the patient is located in [place], patient verifies this as the location during the entirety of this visit.     New Bariatric Nutrition Consultation Note    Reason For Visit: Nutrition Assessment    Swetha Patterson is a 51 year old presenting today for new bariatric nutrition consult.   Pt is interested in laparoscopic sleeve gastrectomy.    This is pt's first of 3 required nutrition visits prior to surgery.     Pt referred by NOE Blake on January 20, 2022.    Coordination note:   Needs baseline " "labs  Needs Psych eval  Needs letter of support from therapist and psychiatrist   Needs clearance from sleep medicine   Needs PCP letter of support  10 lb weight loss from initial  Surgeon meet and greet  No nicotine for 3 months prior to surgery  Sobriety from alcohol for 1 year - May 2022     Patient with Co-morbidities of obesity including:  Type II DM yes  Renal Failure no  Sleep apnea yes  Hypertension yes  Dyslipidemia yes  Joint pain no  Back pain no  GERD no     PMH: fatty liver (nonalcoholic per pt), fibromyalgia, cholecystomy, asthma    Support System Reviewed With Patient 1/19/2022   Who do you have in your support network that can be available to help you for the first 2 weeks after surgery? I have my sister Rukhsana a very close friend of keri Mckinley and I have my dad and I also have psychiatrist my sister Rukhsana Kearns my dad and my psychiatrist again Rukhsana Milian   Who can you count on for support throughout your weight loss surgery journey? Rukhsana Kearns my dad my psychiatrist       ANTHROPOMETRICS:  Consult weight 1/20/22: 247 lbs with BMI 43.75    Estimated body mass index is 43.75 kg/m  as calculated from the following:    Height as of an earlier encounter on 1/20/22: 1.6 m (5' 3\").    Weight as of an earlier encounter on 1/20/22: 112 kg (247 lb).     Current weight: 247 lbs    Required weight loss goal pre-op: 10 lbs from initial consult weight (goal weight 237 lbs or less before surgery)       1/19/2022   I have tried the following methods to lose weight Watching portions or calories, Exercise, Physician directed program       Weight Loss Questions Reviewed With Patient 1/19/2022   How long have you been overweight? From Middle age and beyond       SUPPLEMENT INFORMATION:  Vit D 5000 IUS/ MWF    Needs baseline labs    NUTRITION HISTORY:      NKFA    Appointment today focused on reviewing the dietary guidelines for surgery and answering pt questions.     Pt reports she wants " surgery to improve her health. Recent diabetes dx.  Pt shared she doesn t know much portions or what she should be eating in general.     Working on cutting out pop.  Does not eat a lot of bread unless having a burger.     Fluids: diet pop occ (small amounts per pt - likes the carbonation),  juice rarely, lots of water (up to 4-5 12 oz bottles per pt, 48 -60 oz). Takes a water pill per pt.     Currently eats fast and does not chew well per pt. Pt reports overeating to the point she will involuntarily vomit.     Pt seen at [facility] previously - not able to have surgery at that time due to mental health/alcohol concerns.     Pt s friend had sleeve surgery - is familiar with some of it through her. Went well for friend.    Nicotine: Smoked for ~40+ years per pt. Quit cigarettes two weeks ago, using patch and gum. Refer to [facility]TM. None for 3 months prior to surgery    Alcohol: hx of abuse. Sober since May 2021. No surgery until May 2022    Recall Diet Questions Reviewed With Patient 1/19/2022   Describe what you typically consume for breakfast (typical or most recent): I usually don't eat much for breakfast maybe a piece of toast.   Describe what you typically consume for lunch (typical or most recent): Sometimes hot dogs or macaroni and cheese or hamburgers or fast food or eggs or just about anything   Describe what you typically consume for supper (typical or most recent): Always have to have meat with my dinner potato vegetable if it's corn or green beans but I prefer peas and then I snack at night time before bed because I'm so hungry   Describe what you typically consume as snacks (typical or most recent): Cereal bars chips sometimes fruit a hamburger fast food   How many ounces of water, or other low calorie drinks, do you drink daily (8 oz=1 glass)? 48 oz   How many ounces of caffeine (coffee, tea, pop) do you drink daily (8 oz=1 glass)? 8 oz   How many ounces of carbonated (pop, beer, sparkling water)  drinks do you drinky daily (8 oz=1 glass)? 8 oz   How many ounces of juice, pop, sweet tea, sports drinks, protein drinks, other sweetened drinks, do you drink daily (8 oz=1 glass)? 24 oz   How many ounces of milk do you drink daily (8 oz=1 glass) 8 oz   Please indicate the type of milk: 2%   How often do you drink alcohol? Never   If you do drink alcohol, how many drinks might you have in a day? (one drink = 5 oz. wine, 1 can/bottle of beer, 1 shot liquor) -       Eating Habits 1/19/2022   Do you have any dietary restrictions? Yes   Do you currently binge eat (eat a large amount of food in a short time)? -   Are you an emotional eater? No   Do you get up to eat after falling asleep? Yes   What foods do you crave? I crave Pizza macaroni and cheese [employer] hamburger french fries but I do still eat my vegetables     Dining Out History Reviewed With Patient 1/19/2022   How often do you dine out? A couple of times a week.   Where do you dine out? (select all that apply) fast food chains, take out   What types of food do you order when you dine out? Some type of meat potato and of course you got to have a green beans or corn       Physical Activity Reviewed With Patient 1/19/2022   How often do you exercise? Daily   What is the duration of your exercise (in minutes)? 60+ Minutes   What types of exercise do you do? walking, other   What keeps you from being more active?  I am as active as I can possbily be, Shortness of breath       NUTRITION DIAGNOSIS:  Obesity r/t long history of positive energy balance aeb BMI >30 kg/m2.    INTERVENTION:  Intervention Provided/Education Provided on post-op diet guidelines, vitamins/minerals essential post-operatively, GI anatomy of bariatric surgeries, ways to help prepare for post-op diet guidelines pre-operatively, portion/calorie-control, mindful eating and sources of protein.  Patient demonstrates understanding. Provided pt with list of goals RD contact information.      Questions  Reviewed With Patient 1/19/2022   How ready are you to make changes regarding your weight? Number 1 = Not ready at all to make changes up to 10 = very ready. 10   How confident are you that you can change? 1 = Not confident that you will be successful making changes up to 10 = very confident. 10       Expected Engagement: fair-good    GOALS:  Relating To Eating:  Eat slowly (20-30 minutes per meal), chewing foods well (25 chews per bite/applesauce consistency)   - Set utensil/food down between bites  Focus on eating smaller portion sizes at meals and snacks    Relating to beverages:  Reduce caffeine/carbonation/calorie containing beverages  Separate fluids from meals by 30 minutes before, during, and after eating  Avoid alcohol    Relating to dietary supplements:  Start a multivitamin containing iron daily    Surgery resources:  Diet Guidelines after Weight Loss Surgery  [email or web address]     Post-op Diet Advancement Schedule:  Clear Liquid Diet (stage 1):  TBD   *No RED, PROTEIN or PULP day before surgery  Low-Fat Full Liquid Diet (stage 2): TBD (2 weeks total between clear/liquid)  Pureed Diet (stage 3): TBD (2 weeks)  Soft Diet (stage 4): TBD (4 weeks)  Regular Diet (stage 5): TBD     Take the following after a Sleeve Gastrectomy:  - Multivitamin/minerals: adult dose 1-2 times daily  Ideally want one that provides:   5,000 - 10,000 international units vitamin A daily              800 mg oral folate daily  8 - 22 mg zinc and 1 - 2 mg copper daily  12 mg Thiamine  - Iron: 45-60 mg elemental (18-36 mg if low risk) - may partly or fully be covered in multivitamin   - Calcium Citrate containing vitamin D: 500 mg 3 times daily or 600 mg 2 times daily              - Separate the calcium from your multivitamin or iron by at least 2 hours.               - Must be a chewable calcium citrate until post-op 3 months               - Options for calcium citrate: Beni calcium citrate chewable, bariatric advantage  calcium citrate chewable, Celebrate vitamins calcium citrate chewable, Bariatric Fusion calcium citrate chewable  - Vitamin D - at least 3,000 international units/day between all supplements  - Vitamin B12: sublingual form of at least 500 mcg daily or injection of 1000 mcg monthly         Weight loss resources:    Diabetes Plate Method:  https://www.diabetesfoodhub.org/articles/what-is-the-diabetes-plate-method.html    Diabetes Recipe Resources:  https://www.diabetesfoodhub.org/   https://www.cdc.gov/diabetes/library/spotlights/hack-your-snack.html   https://www.eatright.org/food/nutrition/dietary-guidelines-and-myplate/how-to-add-whole-grains-to-your-diet   https://[employer]/recipes   https://www.diabetes.org/healthy-living/recipes-nutrition   https://[employer]/diabetic-recipe/turkey-veggie-snacks     Carbohydrates  [email or web address]     Carbohydrate Counting  [email or web address]     Guide To Carbohydrate Counting  [email or web address]    Protein Sources   [email or web address]      Mindful Eating  [email or web address]      Summary of Volumetrics Eating Plan  [email or web address]        Follow up: Monthly until surgery    Time spent with patient: 47 minutes.  SHELLY Neal, RD, LD

## 2022-01-19 ENCOUNTER — HOSPITAL ENCOUNTER (OUTPATIENT)
Dept: NUCLEAR MEDICINE | Facility: CLINIC | Age: 52
Setting detail: NUCLEAR MEDICINE
End: 2022-01-19
Attending: INTERNAL MEDICINE
Payer: COMMERCIAL

## 2022-01-19 ENCOUNTER — HOSPITAL ENCOUNTER (OUTPATIENT)
Dept: CARDIOLOGY | Facility: CLINIC | Age: 52
End: 2022-01-19
Attending: INTERNAL MEDICINE
Payer: COMMERCIAL

## 2022-01-19 DIAGNOSIS — R07.2 PRECORDIAL PAIN: ICD-10-CM

## 2022-01-19 DIAGNOSIS — R06.02 SOB (SHORTNESS OF BREATH): ICD-10-CM

## 2022-01-19 LAB
CV BLOOD PRESSURE: 68 MMHG
CV STRESS MAX HR HE: 100
RATE PRESSURE PRODUCT: NORMAL
STRESS ECHO BASELINE DIASTOLIC HE: 94
STRESS ECHO BASELINE HR: 79 BPM
STRESS ECHO BASELINE SYSTOLIC BP: 162
STRESS ECHO CALCULATED PERCENT HR: 60 %
STRESS ECHO LAST STRESS DIASTOLIC BP: 104
STRESS ECHO LAST STRESS SYSTOLIC BP: 163
STRESS ECHO TARGET HR: 168

## 2022-01-19 PROCEDURE — A9502 TC99M TETROFOSMIN: HCPCS | Performed by: INTERNAL MEDICINE

## 2022-01-19 PROCEDURE — 93016 CV STRESS TEST SUPVJ ONLY: CPT | Performed by: INTERNAL MEDICINE

## 2022-01-19 PROCEDURE — 250N000011 HC RX IP 250 OP 636

## 2022-01-19 PROCEDURE — 343N000001 HC RX 343: Performed by: INTERNAL MEDICINE

## 2022-01-19 PROCEDURE — 93017 CV STRESS TEST TRACING ONLY: CPT

## 2022-01-19 PROCEDURE — 78452 HT MUSCLE IMAGE SPECT MULT: CPT

## 2022-01-19 PROCEDURE — 93018 CV STRESS TEST I&R ONLY: CPT | Performed by: INTERNAL MEDICINE

## 2022-01-19 PROCEDURE — 78452 HT MUSCLE IMAGE SPECT MULT: CPT | Mod: 26 | Performed by: INTERNAL MEDICINE

## 2022-01-19 RX ORDER — ALBUTEROL SULFATE 90 UG/1
2 AEROSOL, METERED RESPIRATORY (INHALATION) EVERY 5 MIN PRN
Status: DISCONTINUED | OUTPATIENT
Start: 2022-01-19 | End: 2022-01-20 | Stop reason: HOSPADM

## 2022-01-19 RX ORDER — AMINOPHYLLINE 25 MG/ML
50-100 INJECTION, SOLUTION INTRAVENOUS
Status: DISCONTINUED | OUTPATIENT
Start: 2022-01-19 | End: 2022-01-20 | Stop reason: HOSPADM

## 2022-01-19 RX ORDER — ACYCLOVIR 200 MG/1
0-1 CAPSULE ORAL
Status: DISCONTINUED | OUTPATIENT
Start: 2022-01-19 | End: 2022-01-20 | Stop reason: HOSPADM

## 2022-01-19 RX ORDER — REGADENOSON 0.08 MG/ML
INJECTION, SOLUTION INTRAVENOUS
Status: COMPLETED
Start: 2022-01-19 | End: 2022-01-19

## 2022-01-19 RX ORDER — REGADENOSON 0.08 MG/ML
0.4 INJECTION, SOLUTION INTRAVENOUS ONCE
Status: COMPLETED | OUTPATIENT
Start: 2022-01-19 | End: 2022-01-19

## 2022-01-19 RX ADMIN — TETROFOSMIN 33 MCI.: 1.38 INJECTION, POWDER, LYOPHILIZED, FOR SOLUTION INTRAVENOUS at 14:30

## 2022-01-19 RX ADMIN — REGADENOSON 0.4 MG: 0.08 INJECTION, SOLUTION INTRAVENOUS at 14:24

## 2022-01-19 RX ADMIN — TETROFOSMIN 10.5 MCI.: 1.38 INJECTION, POWDER, LYOPHILIZED, FOR SOLUTION INTRAVENOUS at 12:20

## 2022-01-20 ENCOUNTER — VIRTUAL VISIT (OUTPATIENT)
Dept: ENDOCRINOLOGY | Facility: CLINIC | Age: 52
End: 2022-01-20
Payer: COMMERCIAL

## 2022-01-20 ENCOUNTER — PRE VISIT (OUTPATIENT)
Dept: ENDOCRINOLOGY | Facility: CLINIC | Age: 52
End: 2022-01-20

## 2022-01-20 VITALS — WEIGHT: 247 LBS | HEIGHT: 63 IN | BODY MASS INDEX: 43.77 KG/M2

## 2022-01-20 DIAGNOSIS — E66.01 CLASS 3 SEVERE OBESITY DUE TO EXCESS CALORIES WITH SERIOUS COMORBIDITY AND BODY MASS INDEX (BMI) OF 40.0 TO 44.9 IN ADULT (H): Primary | ICD-10-CM

## 2022-01-20 DIAGNOSIS — E66.813 CLASS 3 SEVERE OBESITY DUE TO EXCESS CALORIES WITH SERIOUS COMORBIDITY AND BODY MASS INDEX (BMI) OF 40.0 TO 44.9 IN ADULT (H): ICD-10-CM

## 2022-01-20 DIAGNOSIS — Z71.3 NUTRITIONAL COUNSELING: Primary | ICD-10-CM

## 2022-01-20 DIAGNOSIS — E66.01 CLASS 3 SEVERE OBESITY DUE TO EXCESS CALORIES WITH SERIOUS COMORBIDITY AND BODY MASS INDEX (BMI) OF 40.0 TO 44.9 IN ADULT (H): ICD-10-CM

## 2022-01-20 DIAGNOSIS — E66.813 CLASS 3 SEVERE OBESITY DUE TO EXCESS CALORIES WITH SERIOUS COMORBIDITY AND BODY MASS INDEX (BMI) OF 40.0 TO 44.9 IN ADULT (H): Primary | ICD-10-CM

## 2022-01-20 DIAGNOSIS — E66.09 OBESITY DUE TO EXCESS CALORIES WITH SERIOUS COMORBIDITY, UNSPECIFIED CLASSIFICATION: ICD-10-CM

## 2022-01-20 DIAGNOSIS — J44.9 COPD, MODERATE (H): ICD-10-CM

## 2022-01-20 DIAGNOSIS — J44.1 COPD EXACERBATION (H): Primary | ICD-10-CM

## 2022-01-20 PROCEDURE — 99215 OFFICE O/P EST HI 40 MIN: CPT | Mod: 25 | Performed by: PHYSICIAN ASSISTANT

## 2022-01-20 PROCEDURE — 97802 MEDICAL NUTRITION INDIV IN: CPT | Mod: GT | Performed by: DIETITIAN, REGISTERED

## 2022-01-20 ASSESSMENT — MIFFLIN-ST. JEOR: SCORE: 1699.51

## 2022-01-20 ASSESSMENT — PAIN SCALES - GENERAL: PAINLEVEL: NO PAIN (0)

## 2022-01-20 NOTE — PATIENT INSTRUCTIONS
GOALS:  Relating To Eating:  Eat slowly (20-30 minutes per meal), chewing foods well (25 chews per bite/applesauce consistency)   - Set utensil/food down between bites  Focus on eating smaller portion sizes at meals and snacks    Relating to beverages:  Reduce caffeine/carbonation/calorie containing beverages  Separate fluids from meals by 30 minutes before, during, and after eating  Avoid alcohol    Relating to dietary supplements:  Start a multivitamin containing iron daily    Surgery resources:  Diet Guidelines after Weight Loss Surgery  [email or web address]     Post-op Diet Advancement Schedule:  Clear Liquid Diet (stage 1):  TBD   *No RED, PROTEIN or PULP day before surgery  Low-Fat Full Liquid Diet (stage 2): TBD (2 weeks total between clear/liquid)  Pureed Diet (stage 3): TBD (2 weeks)  Soft Diet (stage 4): TBD (4 weeks)  Regular Diet (stage 5): TBD     Take the following after a Sleeve Gastrectomy:  - Multivitamin/minerals: adult dose 1-2 times daily  Ideally want one that provides:   5,000 - 10,000 international units vitamin A daily              800 mg oral folate daily  8 - 22 mg zinc and 1 - 2 mg copper daily  12 mg Thiamine  - Iron: 45-60 mg elemental (18-36 mg if low risk) - may partly or fully be covered in multivitamin   - Calcium Citrate containing vitamin D: 500 mg 3 times daily or 600 mg 2 times daily              - Separate the calcium from your multivitamin or iron by at least 2 hours.               - Must be a chewable calcium citrate until post-op 3 months               - Options for calcium citrate: Beni calcium citrate chewable, bariatric advantage calcium citrate chewable, Celebrate vitamins calcium citrate chewable, Bariatric Fusion calcium citrate chewable  - Vitamin D - at least 3,000 international units/day between all supplements  - Vitamin B12: sublingual form of at least 500 mcg daily or injection of 1000 mcg monthly         Weight loss resources:    Diabetes Plate  Method:  https://www.diabetesfoodhub.org/articles/what-is-the-diabetes-plate-method.html    Diabetes Recipe Resources:  https://www.diabetesfoodhub.org/   https://www.cdc.gov/diabetes/library/spotlights/hack-your-snack.html   https://www.eatright.org/food/nutrition/dietary-guidelines-and-myplate/how-to-add-whole-grains-to-your-diet   https://[employer]/recipes   https://www.diabetes.org/healthy-living/recipes-nutrition   https://[employer].LocalOn/diabetic-recipe/turkey-veggie-snacks     Carbohydrates  [email or web address]     Carbohydrate Counting  [email or web address]     Guide To Carbohydrate Counting  [email or web address]    Protein Sources   [email or web address]      Mindful Eating  [email or web address]      Summary of Volumetrics Eating Plan  [email or web address]        Follow up: Monthly until surgery    SHELLY Henning, RD, LD  Clinic #: [phone]

## 2022-01-20 NOTE — LETTER
"1/20/2022       RE: Swetha Patterson  [address]  [facility] [address]     Dear Colleague,    Thank you for referring your patient, Swetha Patterson, to the [facility] [place] [facility]. Please see a copy of my visit note below.    Swetha Patterson is a 51 year old female who is being evaluated via a billable video visit.      The patient has been notified of following:     \"This video visit will be conducted via a call between you and your physician/provider. We have found that certain health care needs can be provided without the need for an in-person physical exam.  This service lets us provide the care you need with a video conversation.  If a prescription is necessary we can send it directly to your pharmacy.  If lab work is needed we can place an order for that and you can then stop by our lab to have the test done at a later time.    Video visits are billed at different rates depending on your insurance coverage.  Please reach out to your insurance provider with any questions.    If during the course of the call the physician/provider feels a video visit is not appropriate, you will not be charged for this service.\"    How would you like to obtain your AVS? MyChart  If the video visit is dropped, the invitation should be resent by: Text to cell phone: [phone]  Will anyone else be joining your video visit? No      Video-Visit Details    Type of service:  Video Visit    Video Start Time: 10:21 am   Video End Time: 11:08 am    Originating Location (pt. Location): Home    Distant Location (provider location):  [facility] [place]     Platform used for Video Visit: Vigster    During this virtual visit the patient is located in [place], patient verifies this as the location during the entirety of this visit.     New Bariatric Nutrition Consultation Note    Reason For Visit: Nutrition " "Assessment    Swetha Patterson is a 51 year old presenting today for new bariatric nutrition consult.   Pt is interested in laparoscopic sleeve gastrectomy.    This is pt's first of 3 required nutrition visits prior to surgery.     Pt referred by NOE Blake on January 20, 2022.    Coordination note:   Needs baseline labs  Needs Psych eval  Needs letter of support from therapist and psychiatrist   Needs clearance from sleep medicine   Needs PCP letter of support  10 lb weight loss from initial  Surgeon meet and greet  No nicotine for 3 months prior to surgery  Sobriety from alcohol for 1 year - May 2022     Patient with Co-morbidities of obesity including:  Type II DM yes  Renal Failure no  Sleep apnea yes  Hypertension yes  Dyslipidemia yes  Joint pain no  Back pain no  GERD no     PMH: fatty liver (nonalcoholic per pt), fibromyalgia, cholecystomy, asthma    Support System Reviewed With Patient 1/19/2022   Who do you have in your support network that can be available to help you for the first 2 weeks after surgery? I have my sister Rukhsana a very close friend of keri Mckinley and I have my dad and I also have psychiatrist my sister Rukhsana Curtismy my dad and my psychiatrist again Rukhsana Mliian   Who can you count on for support throughout your weight loss surgery journey? Rukhsana Kearns my dad my psychiatrist       ANTHROPOMETRICS:  Consult weight 1/20/22: 247 lbs with BMI 43.75    Estimated body mass index is 43.75 kg/m  as calculated from the following:    Height as of an earlier encounter on 1/20/22: 1.6 m (5' 3\").    Weight as of an earlier encounter on 1/20/22: 112 kg (247 lb).     Current weight: 247 lbs    Required weight loss goal pre-op: 10 lbs from initial consult weight (goal weight 237 lbs or less before surgery)       1/19/2022   I have tried the following methods to lose weight Watching portions or calories, Exercise, Physician directed program       Weight Loss Questions " Reviewed With Patient 1/19/2022   How long have you been overweight? From Middle age and beyond       SUPPLEMENT INFORMATION:  Vit D 5000 IUS/ MWF    Needs baseline labs    NUTRITION HISTORY:      NKFA    Appointment today focused on reviewing the dietary guidelines for surgery and answering pt questions.     Pt reports she wants surgery to improve her health. Recent diabetes dx.  Pt shared she doesn t know much portions or what she should be eating in general.     Working on cutting out pop.  Does not eat a lot of bread unless having a burger.     Fluids: diet pop occ (small amounts per pt - likes the carbonation),  juice rarely, lots of water (up to 4-5 12 oz bottles per pt, 48 -60 oz). Takes a water pill per pt.     Currently eats fast and does not chew well per pt. Pt reports overeating to the point she will involuntarily vomit.     Pt seen at [facility] - not able to have surgery at that time due to mental health/alcohol concerns.     Pt s friend had sleeve surgery - is familiar with some of it through her. Went well for friend.    Nicotine: Smoked for ~40+ years per pt. Quit cigarettes two weeks ago, using patch and gum. Refer to [facility]. None for 3 months prior to surgery    Alcohol: hx of abuse. Sober since May 2021. No surgery until May 2022    Recall Diet Questions Reviewed With Patient 1/19/2022   Describe what you typically consume for breakfast (typical or most recent): I usually don't eat much for breakfast maybe a piece of toast.   Describe what you typically consume for lunch (typical or most recent): Sometimes hot dogs or macaroni and cheese or hamburgers or fast food or eggs or just about anything   Describe what you typically consume for supper (typical or most recent): Always have to have meat with my dinner potato vegetable if it's corn or green beans but I prefer peas and then I snack at night time before bed because I'm so hungry   Describe what you typically consume as snacks  (typical or most recent): Cereal bars chips sometimes fruit a hamburger fast food   How many ounces of water, or other low calorie drinks, do you drink daily (8 oz=1 glass)? 48 oz   How many ounces of caffeine (coffee, tea, pop) do you drink daily (8 oz=1 glass)? 8 oz   How many ounces of carbonated (pop, beer, sparkling water) drinks do you drinky daily (8 oz=1 glass)? 8 oz   How many ounces of juice, pop, sweet tea, sports drinks, protein drinks, other sweetened drinks, do you drink daily (8 oz=1 glass)? 24 oz   How many ounces of milk do you drink daily (8 oz=1 glass) 8 oz   Please indicate the type of milk: 2%   How often do you drink alcohol? Never   If you do drink alcohol, how many drinks might you have in a day? (one drink = 5 oz. wine, 1 can/bottle of beer, 1 shot liquor) -       Eating Habits 1/19/2022   Do you have any dietary restrictions? Yes   Do you currently binge eat (eat a large amount of food in a short time)? -   Are you an emotional eater? No   Do you get up to eat after falling asleep? Yes   What foods do you crave? I crave Pizza macaroni and cheese [employer] hamburger french fries but I do still eat my vegetables     Dining Out History Reviewed With Patient 1/19/2022   How often do you dine out? A couple of times a week.   Where do you dine out? (select all that apply) fast food chains, take out   What types of food do you order when you dine out? Some type of meat potato and of course you got to have a green beans or corn       Physical Activity Reviewed With Patient 1/19/2022   How often do you exercise? Daily   What is the duration of your exercise (in minutes)? 60+ Minutes   What types of exercise do you do? walking, other   What keeps you from being more active?  I am as active as I can possbily be, Shortness of breath       NUTRITION DIAGNOSIS:  Obesity r/t long history of positive energy balance aeb BMI >30 kg/m2.    INTERVENTION:  Intervention Provided/Education Provided on post-op diet  guidelines, vitamins/minerals essential post-operatively, GI anatomy of bariatric surgeries, ways to help prepare for post-op diet guidelines pre-operatively, portion/calorie-control, mindful eating and sources of protein.  Patient demonstrates understanding. Provided pt with list of goals RD contact information.      Questions Reviewed With Patient 1/19/2022   How ready are you to make changes regarding your weight? Number 1 = Not ready at all to make changes up to 10 = very ready. 10   How confident are you that you can change? 1 = Not confident that you will be successful making changes up to 10 = very confident. 10       Expected Engagement: fair-good    GOALS:  Relating To Eating:  Eat slowly (20-30 minutes per meal), chewing foods well (25 chews per bite/applesauce consistency)   - Set utensil/food down between bites  Focus on eating smaller portion sizes at meals and snacks    Relating to beverages:  Reduce caffeine/carbonation/calorie containing beverages  Separate fluids from meals by 30 minutes before, during, and after eating  Avoid alcohol    Relating to dietary supplements:  Start a multivitamin containing iron daily    Surgery resources:  Diet Guidelines after Weight Loss Surgery  [email or web address]     Post-op Diet Advancement Schedule:  Clear Liquid Diet (stage 1):  TBD   *No RED, PROTEIN or PULP day before surgery  Low-Fat Full Liquid Diet (stage 2): TBD (2 weeks total between clear/liquid)  Pureed Diet (stage 3): TBD (2 weeks)  Soft Diet (stage 4): TBD (4 weeks)  Regular Diet (stage 5): TBD     Take the following after a Sleeve Gastrectomy:  - Multivitamin/minerals: adult dose 1-2 times daily  Ideally want one that provides:   5,000 - 10,000 international units vitamin A daily              800 mg oral folate daily  8 - 22 mg zinc and 1 - 2 mg copper daily  12 mg Thiamine  - Iron: 45-60 mg elemental (18-36 mg if low risk) - may partly or fully be covered in multivitamin   - Calcium  Citrate containing vitamin D: 500 mg 3 times daily or 600 mg 2 times daily              - Separate the calcium from your multivitamin or iron by at least 2 hours.               - Must be a chewable calcium citrate until post-op 3 months               - Options for calcium citrate: Beni calcium citrate chewable, bariatric advantage calcium citrate chewable, Celebrate vitamins calcium citrate chewable, Bariatric Fusion calcium citrate chewable  - Vitamin D - at least 3,000 international units/day between all supplements  - Vitamin B12: sublingual form of at least 500 mcg daily or injection of 1000 mcg monthly         Weight loss resources:    Diabetes Plate Method:  https://www.diabetesfoodhub.org/articles/what-is-the-diabetes-plate-method.html    Diabetes Recipe Resources:  https://www.diabetesfoodBrieFixb.org/   https://www.cdc.gov/diabetes/library/spotlights/hack-your-snack.html   https://www.eatright.org/food/nutrition/dietary-guidelines-and-myplate/how-to-add-whole-grains-to-your-diet   https://Adormo/recipes   https://www.diabetes.org/healthy-living/recipes-nutrition   https://PassHat.Sportlobster/diabetic-recipe/turkey-veggie-snacks     Carbohydrates  [email or web address]     Carbohydrate Counting  [email or web address]     Guide To Carbohydrate Counting  [email or web address]    Protein Sources   http://Mobile Safe [email or web address]      Mindful Eating  http://Mobile Safe [email or web address]      Summary of Volumetrics Eating Plan  [email or web address]        Follow up: Monthly until surgery    Time spent with patient: 47 minutes.  SHELLY Neal, RD, LD

## 2022-01-20 NOTE — LETTER
"2022       RE: Swetha Patterson  [address]  [facility] [address]     Dear Colleague,    Thank you for referring your patient, Swetha Patterson, to the [facility] [place] at [facility]. Please see a copy of my visit note below.    Swetha is a 51 year old who is being evaluated via a billable video visit.      How would you like to obtain your AVS? MyChart  If the video visit is dropped, the invitation should be resent by: Text to cell phone: [phone]  Will anyone else be joining your video visit? No    Video Start Time: 9:28 AM    Video-Visit Details    Type of service:  Video Visit    Video End Time:1000    Originating Location (pt. Location): Home    Distant Location (provider location):  [facility]     Platform used for Video Visit: [email or web address]     40 minutes spent on the date of the encounter doing chart review, history and exam, documentation and further activities per the note    New Bariatric Surgery Consultation Note    2022    RE: Swetha Patterson  MR#: [id number]  : 1970      Referring provider:       2022   Who referred you? Hi doctor [facility] clinic in [place]       Chief Complaint/Reason for visit: evaluation for possible weight loss surgery    Dear Roro Chawla MD (General),    I had the pleasure of seeing your patient, Swetha Patterson, to evaluate her obesity and consider her for possible weight loss surgery. As you know, Swetha Patterson is 51 year old.  She has a height of 5' 3\", a weight of 247 lbs 0 oz, and calculated Body mass index is 43.75 kg/m .    Assessment & Plan   Problem List Items Addressed This Visit     None      Visit Diagnoses     Obesity due to excess calories with serious comorbidity, unspecified classification             HISTORY OF PRESENT ILLNESS:  Weight Loss History Reviewed with Patient " 2022   How long have you been overweight? From Middle age and beyond   What is the most that you have ever weighed? 247   What is the most weight you have lost? 100   I have tried the following methods to lose weight Watching portions or calories, Exercise, Physician directed program   I have tried the following weight loss medications? (Check all that apply) None   Have you ever had weight loss surgery? No   Referred by GI who she sees for delayed gastric emptying, nausea, GERD, hiatal hernia    CO-MORBIDITIES OF OBESITY INCLUDE:     2022   I have the following health issues associated with obesity: Type II Diabetes, High Blood Pressure, High Cholesterol, Sleep Apnea, GERD (Reflux), Fatty Liver, Asthma   COPD: steroids occasionally since . Quit smoking 2 weeks ago, using patch. Pulmonary provider: Dr Matt HAINES of [place]  T2DM: A1C 5.6 one year ago, needs updated.  Checks blood sugars   Takes metformin and glipizide.   Fatty liver: hx of alcohol abuse 10 years ago.  Quit drinking  and one relapse 2 years ago with suicide attempt after nephew  May 2020.  In [facility] review she has alcohol level 8 months ago and ER visit but patient does not recall this.  GERD: omeprazole twice daily    Has home care once weekly to help with med machine.    Working with a therapist once weekly for the last 2 years.    Saw SD program Oct 2021 and told she wasn't a bariatric surgical candidate due to suicide attempt May 2020 and relapse with alcohol.       PLAN:  Possible sleeve gastrectomy after May 2022 with Dr Leach  Consider starting naltrexone.  No opioid use.  Already taking bupropion. Will check up to date LFTs first.  Bariatric labs any [facility] lab.  Need smoking and all nicotine cessation at least 3 months prior to surgery  Psychologist evaluation asap  Letter of support from therapist  No alcohol use >1 year.  Last use in Epic 21  Plan to see Dr Leach to discuss sleeve gastrectomy after psych eval, smoking  "cessation and letter from therapist complete.  Letters of clearance from pulmonary (COPD) and sleep clinic. Schedule sleep clinic ASAP.      See RD in 1 month and monthly until surgery  See [facility] pharmacist Hollie phone visit for smoking cessation and follow up naltrexone start possibly  See Rashmi in 3 months pre bariatric surgery visit video          PAST MEDICAL HISTORY:  Past Medical History:   Diagnosis Date     Anxiety state, unspecified      Asthma      Chronic pain     back pain from cyst     Contact dermatitis and other eczema, due to unspecified cause      COPD (chronic obstructive pulmonary disease) (H)      Depressive disorder, not elsewhere classified      Diabetes (H)      Emphysema with chronic bronchitis (H)      Esophageal reflux      Family history of ischemic heart disease      Fibromyalgia      Gastro-oesophageal reflux disease      Heart disease     has chest pain sometimes     History of emphysema      Hoarseness      HTN, goal below 140/90      Hyperlipidemia LDL goal <130      Liver disease     \"fatty liver\"     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)      PONV (postoperative nausea and vomiting)      Rectal bleeding        PAST SURGICAL HISTORY:  Past Surgical History:   Procedure Laterality Date     ANESTHESIA OUT OF OR MRI N/A 10/7/2021    Procedure: ANESTHESIA OUT OF OR MRI;  Surgeon: GENERIC ANESTHESIA PROVIDER;  Location: [facility] OR     ARTHROSCOPY SHOULDER DECOMPRESSION Left 10/21/2015    Procedure: ARTHROSCOPY SHOULDER DECOMPRESSION;  Surgeon: Julien Milian MD;  Location: [facility] OR     BIOPSY ARTERY TEMPORAL Left 3/11/2020    Procedure: LEFT TEMPORAL ARTERY BIOPSY;  Surgeon: Ibeth Conner MD;  Location: [facility] OR     BRONCHIAL THERMOPLASTY N/A 11/14/2014    Procedure: BRONCHIAL THERMOPLASTY;  Surgeon: Ward Whitaker MD;  [facility] GI     BRONCHIAL THERMOPLASTY N/A 12/19/2014    Procedure: BRONCHIAL THERMOPLASTY;  Surgeon: Ward Whitaker MD;  [facility] OR     " BRONCHIAL THERMOPLASTY N/A 2/6/2015    Procedure: BRONCHIAL THERMOPLASTY;  Surgeon: Ward Whitaker MD;  [facility]     COLONOSCOPY N/A 11/26/2018    Procedure: COLONOSCOPY ([facility]);  Surgeon: Marshall Oakes MD;  Location:  OR     COLONOSCOPY N/A 10/22/2020    Procedure: Colonoscopy;  Surgeon: Marshall Mccormick MD;  Location: [facility] OR     DISCECTOMY, FUSION CERVICAL ANTERIOR ONE LEVEL, COMBINED N/A 5/1/2018    Procedure: COMBINED DISCECTOMY, FUSION CERVICAL ANTERIOR ONE LEVEL;  1.  C5-C6 anterior cervical diskectomy and fusion.    2.  C5-C6 application of intervertebral biomechanical device for interbody fusion purposes.    3.  C5-C6 anterior instrumentation using the standard Kristin InViZia 24 mm plate with four associated bone screws, 12 mm in length.  Inferior screws are fixed.  Superior screws are va     ENT SURGERY  2015    polyps removed from vocal cords      ESOPHAGOSCOPY, GASTROSCOPY, DUODENOSCOPY (EGD), COMBINED N/A 10/22/2020    Procedure: Esophagoscopy, gastroscopy, duodenoscopy with biopsies;  Surgeon: Marshall Mccormick MD;  Location:  OR     ESOPHAGOSCOPY, GASTROSCOPY, DUODENOSCOPY (EGD), COMBINED N/A 6/17/2021    Procedure: ESOPHAGOGASTRODUODENOSCOPY, WITH BIOPSY;  Surgeon: Darrel Damon MD;  Location:  GI     EXCISE MASS TRUNK Left 11/3/2021    Procedure: EXCISION LEFT SHOULDER LIPOMA;  Surgeon: Ibeth Conner MD;  Location:  OR     EXCISE NODE MEDIASTINAL  4/26/2013    Procedure: EXCISE NODE MEDIASTINAL;;  Surgeon: Av Peña MD;  Location:  OR     LAPAROSCOPIC CHOLECYSTECTOMY N/A 10/19/2017    Procedure: LAPAROSCOPIC CHOLECYSTECTOMY;  LAPAROSCOPIC CHOLECYSTECTOMY;  Surgeon: Ney Jerry MD;  Location:  OR     THORACOSCOPY  4/26/2013    Procedure: THORACOSCOPY;  LEFT VIDEO ASSISTED THORACOSCOPY, RESECTION OF POSTERIOR MEDIASTINAL MASS;  Surgeon: Av Peña MD;  Location:  OR     TONSILLECTOMY  as a kid     TONSILLECTOMY       [facility]  APPENDECTOMY  at age 17       FAMILY HISTORY:   Family History   Problem Relation Age of Onset     Heart Disease Mother         murmur, mi     Hypertension Mother      Cerebrovascular Disease Mother      Depression Mother      Gallbladder Disease Mother      Asthma Mother      Family History Negative Father         does not know  him     Family History Negative Brother      Heart Disease Maternal Grandfather      Asthma Maternal Grandfather      Hypertension Maternal Grandfather      Cerebrovascular Disease Maternal Grandfather      Diabetes Maternal Grandfather      Asthma Sister      Hypertension Sister      Cerebrovascular Disease Sister      Hypertension Maternal Grandmother      Cerebrovascular Disease Maternal Grandmother        SOCIAL HISTORY:   Social History Questions Reviewed With Patient 1/19/2022   Which best describes your employment status (select all that apply) I am disabled   Which best describes your marital status:    Do you have children? No   Who do you have in your support network that can be available to help you for the first 2 weeks after surgery? I have my sister Rukhsana a very close friend of keri Mckinley and I have my dad and I also have psychiatrist my sister Rukhsana Curtismy my dad and my psychiatrist again Rukhsana Milian   Who can you count on for support throughout your weight loss surgery journey? Rukhsana Haynesgurwinder Kearns my dad my psychiatrist   Can you afford 3 meals a day?  Yes   Can you afford 50-60 dollars a month for vitamins? Yes       HABITS:     1/19/2022   How often do you drink alcohol? Never   If you do drink alcohol, how many drinks might you have in a day? (one drink = 5 oz. wine, 1 can/bottle of beer, 1 shot liquor) -   Have you ever used any of the following nicotine products? Cigarettes   If you previously used any of these products, what year did you quit? 2022   Have you or are you currently using street drugs or prescription strength medication for  which you do not have a prescription for? No   Do you have a history of chemical dependency (alcohol or drug abuse)? Yes     Currently taking narcotic/opioids No    PSYCHOLOGICAL HISTORY:   Psychological History Reviewed With Patient 1/19/2022   Have you ever attempted suicide? In the last 1 to 5 years.   Have you had thoughts of suicide in the past year? No   Have you ever been hospitalized for mental illness or a suicide attempt? In the last 1 to 5 years.   Do you have a history of chronic pain? Yes   Have you ever been diagnosed with fibromyalgia? Yes   Are you currently being treated for any of the following? (select all that apply) Anxiety   Are you currently seeing a therapist or counselor?  Yes   Are you currently seeing a psychiatrist? Yes       ROS:     1/19/2022   Skin:  Skin fold rashes (groin or other folds)   HEENT: Teeth, dentures, or bridges needing repairs   If you answered yes to missing teeth, please indicate how many: 14   Musculoskeletal: Joint Pain, Back pain   Cardiovascular: Shortness of breath with activity   Pulmonary: Shortness of breath with activity, Snoring   Gastrointestinal: Heartburn, Reflux   Genitourinary: None of the above   Hematological: None of the above   Neurological: None of the above   Female only: None of the above       EATING BEHAVIORS:     1/19/2022   Have you or anyone else thought that you had an eating disorder? No   If you answered yes to the previous eating disorder question, select the types that apply from this list: -   Do you currently binge eat (eat a large amount of food in a short time)? -   Are you an emotional eater? No   Do you get up to eat after falling asleep? Yes       EXERCISE:     1/19/2022   How often do you exercise? Daily   What is the duration of your exercise (in minutes)? 60+ Minutes   What types of exercise do you do? walking, other   What keeps you from being more active?  I am as active as I can possbily be, Shortness of breath        MEDICATIONS:  Current Outpatient Medications   Medication Sig Dispense Refill     albuterol (PROVENTIL) (2.5 MG/3ML) 0.083% neb solution Take 1 vial (2.5 mg) by nebulization every 6 hours as needed for shortness of breath / dyspnea or wheezing 25 vial 3     albuterol (VENTOLIN HFA) 108 (90 Base) MCG/ACT inhaler Inhale 2 puffs into the lungs every 6 hours 3 each 3     amLODIPine (NORVASC) 2.5 MG tablet Take 2 tablets (5 mg) by mouth daily 30 tablet 3     atorvastatin (LIPITOR) 80 MG tablet TAKE ONE TABLET BY MOUTH EVERY DAY 90 tablet 0     benzonatate (TESSALON) 200 MG capsule Take 1 capsule (200 mg) by mouth 3 times daily as needed for cough 60 capsule 1     benzoyl peroxide (ACNE-CLEAR) 10 % external gel Apply topically 2 times daily as needed       blood glucose (ACCU-CHEK ALLYSON PLUS) test strip USE TO TEST BLOOD SUGAR ONCE DAILY OR AS DIRECTED 100 strip 3     blood glucose (NO BRAND SPECIFIED) lancets standard Use to test blood sugar 1 times daily or as directed. 100 each 3     blood glucose (NO BRAND SPECIFIED) lancets standard Use to test blood sugar 1 time daily 100 each 1     blood glucose (NO BRAND SPECIFIED) test strip Use to test blood sugar 1 times daily or as directed.  Dispense Accu-chek Allyson Plus or per patient insurance 100 strip 3     blood glucose monitoring (NO BRAND SPECIFIED) meter device kit Use to test blood sugar 1 times daily or as directed.  Dispense Accu-chek Allyson Plus or per insurance preference 1 kit 0     blood glucose monitoring (NO BRAND SPECIFIED) meter device kit Use to test blood sugar 1 times daily or as directed. 1 kit 0     budesonide-formoterol (SYMBICORT) 160-4.5 MCG/ACT Inhaler Inhale 2 puffs into the lungs 2 times daily 30.6 g 3     buPROPion (WELLBUTRIN XL) 150 MG 24 hr tablet TAKE ONE TABLET BY MOUTH EVERY MORNING 90 tablet 2     cetirizine HCl 10 MG CAPS Take 1 capsule (10 mg) by mouth daily as needed Takes in the spring. 90 capsule 3     clindamycin 1 % EX  external gel Apply topically 2 times daily 60 g 3     clonazePAM (KLONOPIN) 1 MG tablet Take 1 tablet (1 mg) by mouth 2 times daily as needed for anxiety She needs to see her PCP to get more tablets 5 tablet 0     DULoxetine (CYMBALTA) 20 MG capsule Take 1 capsule (20 mg) by mouth daily X 7 days and if well tolerated increase to 40 mg daily. 53 capsule 1     fluticasone (FLONASE) 50 MCG/ACT nasal spray Spray 2 sprays in nostril daily 16 g 5     Fluticasone-Umeclidin-Vilanterol (TRELEGY ELLIPTA) 100-62.5-25 MCG/INH oral inhaler Inhale 1 puff into the lungs daily 28 each 11     furosemide (LASIX) 20 MG tablet Take 1 tablet (20 mg) by mouth 2 times daily 180 tablet 0     glipiZIDE (GLUCOTROL XL) 2.5 MG 24 hr tablet TAKE ONE TABLET BY MOUTH EVERY DAY 90 tablet 1     hydrocortisone 2.5 % cream APPLY TOPICALLY TO THE AFFECTED AREA(S) TWO TIMES A DAY 30 g 1     hydrOXYzine (ATARAX) 50 MG tablet TAKE TWO TABLETS BY MOUTH THREE TIMES A DAY AS NEEDED FOR ANXIETY. 70 tablet 1     lamoTRIgine (LAMICTAL) 150 MG tablet Take 150 mg by mouth daily       lisinopril (ZESTRIL) 20 MG tablet TAKE ONE TABLET BY MOUTH EVERY DAY 90 tablet 0     metFORMIN (GLUCOPHAGE) 500 MG tablet TAKE ONE TABLET BY MOUTH EVERY DAY WITH BREAKFAST 90 tablet 1     montelukast (SINGULAIR) 10 MG tablet TAKE ONE TABLET BY MOUTH AT BEDTIME 90 tablet 0     nicotine (NICODERM CQ) 14 MG/24HR 24 hr patch Place 1 patch onto the skin every 24 hours 28 patch 1     nicotine (NICODERM CQ) 21 MG/24HR 24 hr patch Place 1 patch onto the skin every 24 hours 28 patch 1     nicotine (NICORETTE) 2 MG gum Place 1 each (2 mg) inside cheek every hour as needed for smoking cessation 240 each 1     nitroGLYcerin (NITROSTAT) 0.4 MG sublingual tablet PLACE ONE TABLET UNDER THE TONGUE AT THE 1ST SIGN OF ATTACK. IF PAIN IS UNRELIEVED OR WORSENED 5 MINUTES AFTER 1ST DOSE, PROMPT MEDICAL ASSISTANCE IS NEEDED. MAY REPEAT EVERY 5 MINUTES UNTIL PAIN IS RELIEVED. MAX OF THREE DOSES 25 tablet  11     nystatin (MYCOSTATIN) 617041 UNIT/GM external powder APPLY TOPICALLY TWICE A DAY AS NEEDED SKIN RASH 45 g 3     nystatin (MYCOSTATIN) 453970 UNIT/ML suspension TAKE 5 ML BY MOUTH FOUR TIMES A  mL 0     omeprazole (PRILOSEC) 20 MG DR capsule TAKE ONE CAPSULE BY MOUTH TWO TIMES A  capsule 1     omeprazole (PRILOSEC) 40 MG DR capsule TAKE ONE CAPSULE BY MOUTH TWICE A  capsule 0     ondansetron (ZOFRAN) 8 MG tablet TAKE ONE-HALF TO ONE TABLET BY MOUTH EVERY 8 HOURS AS NEEDED FOR NAUSEA 30 tablet 3     ondansetron (ZOFRAN-ODT) 4 MG ODT tab Take 1 tablet (4 mg) by mouth every 6 hours as needed for nausea 12 tablet 1     polyethylene glycol (MIRALAX) 17 GM/Dose powder Take 17 g (1 capful) by mouth daily 507 g 1     prochlorperazine (COMPAZINE) 10 MG tablet Take 1 tablet (10 mg) by mouth every 8 hours as needed for nausea or vomiting 30 tablet 0     SM MUCUS RELIEF 600 MG 12 hr tablet TAKE TWO TABLET BY MOUTH TWO TIMES DAILY 30 tablet 0     sulfamethoxazole-trimethoprim (BACTRIM DS) 800-160 MG tablet Take 1 tablet by mouth 2 times daily Take one tablet twice daily. For additional refills, please schedule a follow-up appointment. 180 tablet 1     terbinafine (LAMISIL) 1 % external cream APPLY TO AFFECTED AREA(S) TWO TIMES A DAY 30 g 2     VENTOLIN  (90 Base) MCG/ACT inhaler INHALE TWO PUFFS BY MOUTH EVERY 6 HOURS AS NEEDED FOR SHORTNESS OF BREATH 18 g 1       ALLERGIES:  Allergies   Allergen Reactions     Codeine Nausea and Vomiting     vomiting     Cyclobenzaprine Nausea     Gabapentin Rash     Hydrocodone Nausea and Vomiting     Sulfa Drugs Nausea and Vomiting     Nausea        CT abd/pelvis:5/12/21                                                                   IMPRESSION:   1.  No etiology for symptoms. Nothing obstructive or inflammatory involving bowel.  2.  Fatty infiltration of liver.  3.  Small kidney stones bilaterally, no ureteral stone or  hydronephrosis    8/12/21    IMPRESSION: Abnormal gastric emptying .  Delayed T1/2 emptying.  =====================  Reference Range:  Gastric emptying  - 30 minutes: <70% of retention (> 30% emptying) suggests abnormally     fast emptying.  - 60 minutes: <90% retention (>10% emptying) is normal; less than 30%     retention (>70% emptying) suggests abnormally rapid emptying.  - 90 minutes: <65% retention (> 35% emptying) is normal.  - 120 minutes: <60% retention (> 40% emptying) is normal.  - 180 minutes: <30% retention (> 70% emptying) is normal.     Gastric emptying T-1/2:  Solid: The normal range is  minutes  Liquid only: Normal range is 10-45 minutes.  Liquid only-children: At 60 minutes, normal range is 44-58 % .  Liquid only-infants: At 60 minutes, normal range is 32-64 %.    Upper GI 7/15/21                                                                     IMPRESSION:  1.  Small sliding hiatal hernia. Otherwise normal upper GI.    VIDEO SPEECH EVALUATION WITH OR WITHOUT ESOPHAGRAM  11/17/2020 11:26  AM      HISTORY: Dysphonia.     COMPARISON: None.     FLUOROSCOPY TIME: 0.7 minutes.     SPOT FILMS: 4     TECHNIQUE: A modified barium swallow is performed with speech  pathology. Note that only the cervical esophagus was evaluated in  lateral view.                                                                      IMPRESSION: No penetration or aspiration with any tested consistency  of barium.    Impression:               - Tortuous distal esophagus.                             - Mildly severe reflux esophagitis in the distal                             esophagus (c/w LA grade A). Biopsied.                             - Small sliding type hiatal hernia.                             - Gastroesophageal flap valve classified as Hill                             Grade III (minimal fold, loose to endoscope, hiatal                             hernia likely).                             - Non-bleeding erosive  "gastropathy. Biopsied.                             - Erythematous mucosa in the gastric body. Biopsied.                             - Normal examined duodenum.   Recommendation:           - Await pathology results.                             - Return to referring physician after studies are                             complete.                             - Weight loss.                             - Continue omeprazole.   SPECIMEN(S):   A: Antral biopsy   B: Stomach body biopsy   C: Esophageal biopsy at 34 cm     FINAL DIAGNOSIS:   A.  Stomach, mucosal biopsy:   - Benign antral gastric mucosa with mild reactive change in glandular   epithelium and minimal inflammation,   suggestive of reactive gastropathy.   - Immunohistochemical antibody assay for Helicobacter pending.     B.  Stomach, body, mucosal biopsy:   - Normal oxyntic gastric mucosa.   - Immunohistochemical antibody assay for Helicobacter pending.     C.  Esophagus at 34 cm, mucosal biopsy:   - Normal mature nonkeratinizing squamous epithelium.      FIB-4 score: 0.31 [email or web address]://[email or web address]/page/[email or web address]-[email or web address]    Interpretation:  Using a lower cutoff value of 1.45, a FIB-4 score <1.45 had a negative predictive value of 90% for advanced fibrosis (Awais fibrosis score 4-6 which includes early bridging fibrosis to cirrhosis). In contrast, a FIB-4 >3.25 would have a 97% specificity and a positive predictive value of 65% for advanced fibrosis. In the patient cohort in which this formula was first validated, at least 70% patients had values <1.45 or >3.25. Authors argued that these individuals could potentially have avoided liver biopsy with an overall accuracy of 86%.    PHYSICAL EXAM:  Objective    Ht 1.6 m (5' 3\")   Wt 112 kg (247 lb)   LMP 04/15/2016 (Exact Date)   BMI 43.75 kg/m    Vitals - Patient Reported  Pain Score: No Pain (0)        Physical Exam   GENERAL: Healthy, alert and no distress  EYES: Eyes grossly normal to inspection.  " No discharge or erythema, or obvious scleral/conjunctival abnormalities.  RESP: No audible wheeze, cough, or visible cyanosis.  No visible retractions or increased work of breathing.    SKIN: Visible skin clear. No significant rash, abnormal pigmentation or lesions.  NEURO: Cranial nerves grossly intact.  Mentation and speech appropriate for age.  PSYCH: Mentation appears normal, affect normal/bright, judgement and insight intact, normal speech and appearance well-groomed.      In summary, Swteha Patterson has Class III obesity with a body mass index of Body mass index is 43.75 kg/m . kg/m2 and the comorbidities stated above. She completed an informational seminar and is a possible candidate for the laparoscopic gastric sleeve.  She will have to complete the following pre-requisites:        If you have not already watched our online seminar please go to [email or web address]    Weight loss requirement: 10 prior to surgery. Will have final weight check 2-3 weeks prior to surgery at anesthesia or nurse pre-op teaching visit.    -Need current weight confirmation at primary clinic or weight management clinic No    Bariatric labs ordered, call for a lab only appointment at any [facility] lab. To find a lab location near you, please call [phone]. Please let us know if orders need to be faxed to a non [facility] lab.    Schedule bariatric psych eval as soon as possible.  List of psychologists will be sent to you via [employer] or given to you in clinic.     Call Jonny Garber at [phone] to discuss insurance coverage for bariatric surgery.  Please check with your insurance regarding bariatric surgery coverage also. Jonny can also help you with scheduling psych eval if you are having difficulties.    The following clearance letters are needed: Letters will be sent to you via [employer] or given to you in clinic  - Letter of support from primary care provider. Provider can submit through electronic  medical record or fax to [phone]  - sleep  - pulmonary(COPD)  - cardiology   - letter from therapist    Smoking cessation and nicotine test needed: Yes    NEXT VISITS: A  should reach out to you to schedule the following appointments.  If they do not reach you please call [phone] to schedule the following appointments:    -See dietitian in 1 month and monthly for 3 months    -See Lauren Bloch [facility] pharmacist in 1 month to follow up on weight loss medications    -See Rashmi in 3 months to follow up on pre-op weight loss and weight loss medications    -See Dr Leach after psych eval and nicotine cessation to discuss sleeve gastrectomy.    Today in the office we discussed gastric sleeve surgery. Preoperative, perioperative, and postoperative processes, management, and follow up were addressed.  Risks and benefits were outlined including the risk of death, staple line leak (1-2%), PE, DVT, ulcer, worsening GERD, N/V, stricture, hernia, wound infection, weight regain, and vitamin deficiencies. I emphasized exercise and activity along with appropriate food choice as the main foundation for weight loss with surgery providing surgical reinforcement of this.  All questions were answered.  A goal sheet and support group handout were given to the patient.      Once the patient has completed the requirements in their task list and there are no further recommendations, the pt will be allowed to see the surgeon of her choice for consultation on the laparoscopic gastric sleeve surgery. Patient verbalizes understanding of the process to surgery and expectations for the postoperative period including the need for lifelong lifestyle changes, vitamin supplementation, and laboratory monitoring.    Rashmi Del Rio PA-C

## 2022-01-20 NOTE — PROGRESS NOTES
"Swetha is a 51 year old who is being evaluated via a billable video visit.      How would you like to obtain your AVS? MyChart  If the video visit is dropped, the invitation should be resent by: Text to cell phone: [phone]  Will anyone else be joining your video visit? No    Video Start Time: 9:28 AM    Video-Visit Details    Type of service:  Video Visit    Video End Time:1000    Originating Location (pt. Location): Home    Distant Location (provider location):  [facility] [place]     Platform used for Video Visit: Car in the Cloud     40 minutes spent on the date of the encounter doing chart review, history and exam, documentation and further activities per the note    New Bariatric Surgery Consultation Note    2022    RE: Swetha Patterson  MR#: [id number]  : 1970      Referring provider:       2022   Who referred you? Hi doctor [facility] clinic in [place]       Chief Complaint/Reason for visit: evaluation for possible weight loss surgery    Dear Cammy, Roro Mehta MD (General),    I had the pleasure of seeing your patient, Swetha Patterson, to evaluate her obesity and consider her for possible weight loss surgery. As you know, Swetha Patterson is 51 year old.  She has a height of 5' 3\", a weight of 247 lbs 0 oz, and calculated Body mass index is 43.75 kg/m .    Assessment & Plan   Problem List Items Addressed This Visit     None      Visit Diagnoses     Obesity due to excess calories with serious comorbidity, unspecified classification             HISTORY OF PRESENT ILLNESS:  Weight Loss History Reviewed with Patient 2022   How long have you been overweight? From Middle age and beyond   What is the most that you have ever weighed? 247   What is the most weight you have lost? 100   I have tried the following methods to lose weight Watching portions or calories, Exercise, Physician directed program   I have tried the following weight " loss medications? (Check all that apply) None   Have you ever had weight loss surgery? No   Referred by GI who she sees for delayed gastric emptying, nausea, GERD, hiatal hernia    CO-MORBIDITIES OF OBESITY INCLUDE:     2022   I have the following health issues associated with obesity: Type II Diabetes, High Blood Pressure, High Cholesterol, Sleep Apnea, GERD (Reflux), Fatty Liver, Asthma   COPD: steroids occasionally since . Quit smoking 2 weeks ago, using patch. Pulmonary provider: Dr Matt Lin [facility]  T2DM: A1C 5.6 one year ago, needs updated.  Checks blood sugars   Takes metformin and glipizide.   Fatty liver: hx of alcohol abuse 10 years ago.  Quit drinking  and one relapse 2 years ago with suicide attempt after nephew  May 2020.  In [facility] review she has alcohol level 8 months ago and ER visit but patient does not recall this.  GERD: omeprazole twice daily    Has home care once weekly to help with med machine.    Working with a therapist once weekly for the last 2 years.    Saw [facility] program Oct 2021 and told she wasn't a bariatric surgical candidate due to suicide attempt May 2020 and relapse with alcohol.       PLAN:  Possible sleeve gastrectomy after May 2022 with Dr Leach  Consider starting naltrexone.  No opioid use.  Already taking bupropion. Will check up to date LFTs first.  Bariatric labs any [facility] lab.  Need smoking and all nicotine cessation at least 3 months prior to surgery  Psychologist evaluation asap  Letter of support from therapist  No alcohol use >1 year.  Last use in Epic 21  Plan to see Dr Leach to discuss sleeve gastrectomy after psych eval, smoking cessation and letter from therapist complete.  Letters of clearance from pulmonary (COPD) and sleep clinic. Schedule sleep clinic ASAP.      See RD in 1 month and monthly until surgery  See [facility] pharmacist Hollie phone visit for smoking cessation and follow up naltrexone start possibly  See Rashmi in 3 months pre bariatric  "surgery visit video          PAST MEDICAL HISTORY:  Past Medical History:   Diagnosis Date     Anxiety state, unspecified      Asthma      Chronic pain     back pain from cyst     Contact dermatitis and other eczema, due to unspecified cause      COPD (chronic obstructive pulmonary disease) (H)      Depressive disorder, not elsewhere classified      Diabetes (H)      Emphysema with chronic bronchitis (H)      Esophageal reflux      Family history of ischemic heart disease      Fibromyalgia      Gastro-oesophageal reflux disease      Heart disease     has chest pain sometimes     History of emphysema      Hoarseness      HTN, goal below 140/90      Hyperlipidemia LDL goal <130      Liver disease     \"fatty liver\"     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)      PONV (postoperative nausea and vomiting)      Rectal bleeding        PAST SURGICAL HISTORY:  Past Surgical History:   Procedure Laterality Date     ANESTHESIA OUT OF OR MRI N/A 10/7/2021    Procedure: ANESTHESIA OUT OF OR MRI;  Surgeon: GENERIC ANESTHESIA PROVIDER;  Location: [facility] OR     ARTHROSCOPY SHOULDER DECOMPRESSION Left 10/21/2015    Procedure: ARTHROSCOPY SHOULDER DECOMPRESSION;  Surgeon: Julien Milian MD;  Location: [facility] OR     BIOPSY ARTERY TEMPORAL Left 3/11/2020    Procedure: LEFT TEMPORAL ARTERY BIOPSY;  Surgeon: Ibeth Conner MD;  Location: [facility] OR     BRONCHIAL THERMOPLASTY N/A 11/14/2014    Procedure: BRONCHIAL THERMOPLASTY;  Surgeon: Ward Whitaker MD;  Location: [facility] GI     BRONCHIAL THERMOPLASTY N/A 12/19/2014    Procedure: BRONCHIAL THERMOPLASTY;  Surgeon: Ward Whitaker MD;  [facility]: [facility]     BRONCHIAL THERMOPLASTY N/A 2/6/2015    Procedure: BRONCHIAL THERMOPLASTY;  Surgeon: Ward Whitaker MD;  [facility]: [facility] OR     COLONOSCOPY N/A 11/26/2018    Procedure: COLONOSCOPY ([facility]);  Surgeon: Marshall Oakes MD;  Location: [facility] OR     COLONOSCOPY N/A 10/22/2020    Procedure: Colonoscopy;  Surgeon: Marshall Mccormick " MD CARMITA;  Location:  OR     DISCECTOMY, FUSION CERVICAL ANTERIOR ONE LEVEL, COMBINED N/A 5/1/2018    Procedure: COMBINED DISCECTOMY, FUSION CERVICAL ANTERIOR ONE LEVEL;  1.  C5-C6 anterior cervical diskectomy and fusion.    2.  C5-C6 application of intervertebral biomechanical device for interbody fusion purposes.    3.  C5-C6 anterior instrumentation using the standard Kristin InViZia 24 mm plate with four associated bone screws, 12 mm in length.  Inferior screws are fixed.  Superior screws are va     ENT SURGERY  2015    polyps removed from vocal cords      ESOPHAGOSCOPY, GASTROSCOPY, DUODENOSCOPY (EGD), COMBINED N/A 10/22/2020    Procedure: Esophagoscopy, gastroscopy, duodenoscopy with biopsies;  Surgeon: Marshall Mccormick MD;  Location:  OR     ESOPHAGOSCOPY, GASTROSCOPY, DUODENOSCOPY (EGD), COMBINED N/A 6/17/2021    Procedure: ESOPHAGOGASTRODUODENOSCOPY, WITH BIOPSY;  Surgeon: Darrel Damon MD;  Location:  GI     EXCISE MASS TRUNK Left 11/3/2021    Procedure: EXCISION LEFT SHOULDER LIPOMA;  Surgeon: Ibeth Conner MD;  Location:  OR     EXCISE NODE MEDIASTINAL  4/26/2013    Procedure: EXCISE NODE MEDIASTINAL;;  Surgeon: Av Peña MD;  Location:  OR     LAPAROSCOPIC CHOLECYSTECTOMY N/A 10/19/2017    Procedure: LAPAROSCOPIC CHOLECYSTECTOMY;  LAPAROSCOPIC CHOLECYSTECTOMY;  Surgeon: Ney Jerry MD;  Location:  OR     THORACOSCOPY  4/26/2013    Procedure: THORACOSCOPY;  LEFT VIDEO ASSISTED THORACOSCOPY, RESECTION OF POSTERIOR MEDIASTINAL MASS;  Surgeon: Av Peña MD;  Location:  OR     TONSILLECTOMY  as a kid     TONSILLECTOMY       ZZC APPENDECTOMY  at age 17       FAMILY HISTORY:   Family History   Problem Relation Age of Onset     Heart Disease Mother         murmur, mi     Hypertension Mother      Cerebrovascular Disease Mother      Depression Mother      Gallbladder Disease Mother      Asthma Mother      Family History Negative Father         does not  know  him     Family History Negative Brother      Heart Disease Maternal Grandfather      Asthma Maternal Grandfather      Hypertension Maternal Grandfather      Cerebrovascular Disease Maternal Grandfather      Diabetes Maternal Grandfather      Asthma Sister      Hypertension Sister      Cerebrovascular Disease Sister      Hypertension Maternal Grandmother      Cerebrovascular Disease Maternal Grandmother        SOCIAL HISTORY:   Social History Questions Reviewed With Patient 1/19/2022   Which best describes your employment status (select all that apply) I am disabled   Which best describes your marital status:    Do you have children? No   Who do you have in your support network that can be available to help you for the first 2 weeks after surgery? I have my sister Rukhsana a very close friend of keri Mckinley and I have my dad and I also have psychiatrist my sister Rukhsana Kearns my dad and my psychiatrist again Rukhsana Milian   Who can you count on for support throughout your weight loss surgery journey? Rukhsana Kearns my dad my psychiatrist   Can you afford 3 meals a day?  Yes   Can you afford 50-60 dollars a month for vitamins? Yes       HABITS:     1/19/2022   How often do you drink alcohol? Never   If you do drink alcohol, how many drinks might you have in a day? (one drink = 5 oz. wine, 1 can/bottle of beer, 1 shot liquor) -   Have you ever used any of the following nicotine products? Cigarettes   If you previously used any of these products, what year did you quit? 2022   Have you or are you currently using street drugs or prescription strength medication for which you do not have a prescription for? No   Do you have a history of chemical dependency (alcohol or drug abuse)? Yes     Currently taking narcotic/opioids No    PSYCHOLOGICAL HISTORY:   Psychological History Reviewed With Patient 1/19/2022   Have you ever attempted suicide? In the last 1 to 5 years.   Have you had thoughts  of suicide in the past year? No   Have you ever been hospitalized for mental illness or a suicide attempt? In the last 1 to 5 years.   Do you have a history of chronic pain? Yes   Have you ever been diagnosed with fibromyalgia? Yes   Are you currently being treated for any of the following? (select all that apply) Anxiety   Are you currently seeing a therapist or counselor?  Yes   Are you currently seeing a psychiatrist? Yes       ROS:     1/19/2022   Skin:  Skin fold rashes (groin or other folds)   HEENT: Teeth, dentures, or bridges needing repairs   If you answered yes to missing teeth, please indicate how many: 14   Musculoskeletal: Joint Pain, Back pain   Cardiovascular: Shortness of breath with activity   Pulmonary: Shortness of breath with activity, Snoring   Gastrointestinal: Heartburn, Reflux   Genitourinary: None of the above   Hematological: None of the above   Neurological: None of the above   Female only: None of the above       EATING BEHAVIORS:     1/19/2022   Have you or anyone else thought that you had an eating disorder? No   If you answered yes to the previous eating disorder question, select the types that apply from this list: -   Do you currently binge eat (eat a large amount of food in a short time)? -   Are you an emotional eater? No   Do you get up to eat after falling asleep? Yes       EXERCISE:     1/19/2022   How often do you exercise? Daily   What is the duration of your exercise (in minutes)? 60+ Minutes   What types of exercise do you do? walking, other   What keeps you from being more active?  I am as active as I can possbily be, Shortness of breath       MEDICATIONS:  Current Outpatient Medications   Medication Sig Dispense Refill     albuterol (PROVENTIL) (2.5 MG/3ML) 0.083% neb solution Take 1 vial (2.5 mg) by nebulization every 6 hours as needed for shortness of breath / dyspnea or wheezing 25 vial 3     albuterol (VENTOLIN HFA) 108 (90 Base) MCG/ACT inhaler Inhale 2 puffs into the  lungs every 6 hours 3 each 3     amLODIPine (NORVASC) 2.5 MG tablet Take 2 tablets (5 mg) by mouth daily 30 tablet 3     atorvastatin (LIPITOR) 80 MG tablet TAKE ONE TABLET BY MOUTH EVERY DAY 90 tablet 0     benzonatate (TESSALON) 200 MG capsule Take 1 capsule (200 mg) by mouth 3 times daily as needed for cough 60 capsule 1     benzoyl peroxide (ACNE-CLEAR) 10 % external gel Apply topically 2 times daily as needed       blood glucose (ACCU-CHEK ALLYSON PLUS) test strip USE TO TEST BLOOD SUGAR ONCE DAILY OR AS DIRECTED 100 strip 3     blood glucose (NO BRAND SPECIFIED) lancets standard Use to test blood sugar 1 times daily or as directed. 100 each 3     blood glucose (NO BRAND SPECIFIED) lancets standard Use to test blood sugar 1 time daily 100 each 1     blood glucose (NO BRAND SPECIFIED) test strip Use to test blood sugar 1 times daily or as directed.  Dispense Accu-chek Allyson Plus or per patient insurance 100 strip 3     blood glucose monitoring (NO BRAND SPECIFIED) meter device kit Use to test blood sugar 1 times daily or as directed.  Dispense Accu-chek Allyson Plus or per insurance preference 1 kit 0     blood glucose monitoring (NO BRAND SPECIFIED) meter device kit Use to test blood sugar 1 times daily or as directed. 1 kit 0     budesonide-formoterol (SYMBICORT) 160-4.5 MCG/ACT Inhaler Inhale 2 puffs into the lungs 2 times daily 30.6 g 3     buPROPion (WELLBUTRIN XL) 150 MG 24 hr tablet TAKE ONE TABLET BY MOUTH EVERY MORNING 90 tablet 2     cetirizine HCl 10 MG CAPS Take 1 capsule (10 mg) by mouth daily as needed Takes in the spring. 90 capsule 3     clindamycin 1 % EX external gel Apply topically 2 times daily 60 g 3     clonazePAM (KLONOPIN) 1 MG tablet Take 1 tablet (1 mg) by mouth 2 times daily as needed for anxiety She needs to see her PCP to get more tablets 5 tablet 0     DULoxetine (CYMBALTA) 20 MG capsule Take 1 capsule (20 mg) by mouth daily X 7 days and if well tolerated increase to 40 mg daily. 53  capsule 1     fluticasone (FLONASE) 50 MCG/ACT nasal spray Spray 2 sprays in nostril daily 16 g 5     Fluticasone-Umeclidin-Vilanterol (TRELEGY ELLIPTA) 100-62.5-25 MCG/INH oral inhaler Inhale 1 puff into the lungs daily 28 each 11     furosemide (LASIX) 20 MG tablet Take 1 tablet (20 mg) by mouth 2 times daily 180 tablet 0     glipiZIDE (GLUCOTROL XL) 2.5 MG 24 hr tablet TAKE ONE TABLET BY MOUTH EVERY DAY 90 tablet 1     hydrocortisone 2.5 % cream APPLY TOPICALLY TO THE AFFECTED AREA(S) TWO TIMES A DAY 30 g 1     hydrOXYzine (ATARAX) 50 MG tablet TAKE TWO TABLETS BY MOUTH THREE TIMES A DAY AS NEEDED FOR ANXIETY. 70 tablet 1     lamoTRIgine (LAMICTAL) 150 MG tablet Take 150 mg by mouth daily       lisinopril (ZESTRIL) 20 MG tablet TAKE ONE TABLET BY MOUTH EVERY DAY 90 tablet 0     metFORMIN (GLUCOPHAGE) 500 MG tablet TAKE ONE TABLET BY MOUTH EVERY DAY WITH BREAKFAST 90 tablet 1     montelukast (SINGULAIR) 10 MG tablet TAKE ONE TABLET BY MOUTH AT BEDTIME 90 tablet 0     nicotine (NICODERM CQ) 14 MG/24HR 24 hr patch Place 1 patch onto the skin every 24 hours 28 patch 1     nicotine (NICODERM CQ) 21 MG/24HR 24 hr patch Place 1 patch onto the skin every 24 hours 28 patch 1     nicotine (NICORETTE) 2 MG gum Place 1 each (2 mg) inside cheek every hour as needed for smoking cessation 240 each 1     nitroGLYcerin (NITROSTAT) 0.4 MG sublingual tablet PLACE ONE TABLET UNDER THE TONGUE AT THE 1ST SIGN OF ATTACK. IF PAIN IS UNRELIEVED OR WORSENED 5 MINUTES AFTER 1ST DOSE, PROMPT MEDICAL ASSISTANCE IS NEEDED. MAY REPEAT EVERY 5 MINUTES UNTIL PAIN IS RELIEVED. MAX OF THREE DOSES 25 tablet 11     nystatin (MYCOSTATIN) 259057 UNIT/GM external powder APPLY TOPICALLY TWICE A DAY AS NEEDED SKIN RASH 45 g 3     nystatin (MYCOSTATIN) 623149 UNIT/ML suspension TAKE 5 ML BY MOUTH FOUR TIMES A  mL 0     omeprazole (PRILOSEC) 20 MG DR capsule TAKE ONE CAPSULE BY MOUTH TWO TIMES A  capsule 1     omeprazole (PRILOSEC) 40 MG DR  capsule TAKE ONE CAPSULE BY MOUTH TWICE A  capsule 0     ondansetron (ZOFRAN) 8 MG tablet TAKE ONE-HALF TO ONE TABLET BY MOUTH EVERY 8 HOURS AS NEEDED FOR NAUSEA 30 tablet 3     ondansetron (ZOFRAN-ODT) 4 MG ODT tab Take 1 tablet (4 mg) by mouth every 6 hours as needed for nausea 12 tablet 1     polyethylene glycol (MIRALAX) 17 GM/Dose powder Take 17 g (1 capful) by mouth daily 507 g 1     prochlorperazine (COMPAZINE) 10 MG tablet Take 1 tablet (10 mg) by mouth every 8 hours as needed for nausea or vomiting 30 tablet 0     SM MUCUS RELIEF 600 MG 12 hr tablet TAKE TWO TABLET BY MOUTH TWO TIMES DAILY 30 tablet 0     sulfamethoxazole-trimethoprim (BACTRIM DS) 800-160 MG tablet Take 1 tablet by mouth 2 times daily Take one tablet twice daily. For additional refills, please schedule a follow-up appointment. 180 tablet 1     terbinafine (LAMISIL) 1 % external cream APPLY TO AFFECTED AREA(S) TWO TIMES A DAY 30 g 2     VENTOLIN  (90 Base) MCG/ACT inhaler INHALE TWO PUFFS BY MOUTH EVERY 6 HOURS AS NEEDED FOR SHORTNESS OF BREATH 18 g 1       ALLERGIES:  Allergies   Allergen Reactions     Codeine Nausea and Vomiting     vomiting     Cyclobenzaprine Nausea     Gabapentin Rash     Hydrocodone Nausea and Vomiting     Sulfa Drugs Nausea and Vomiting     Nausea        CT abd/pelvis:5/12/21                                                                   IMPRESSION:   1.  No etiology for symptoms. Nothing obstructive or inflammatory involving bowel.  2.  Fatty infiltration of liver.  3.  Small kidney stones bilaterally, no ureteral stone or hydronephrosis    8/12/21    IMPRESSION: Abnormal gastric emptying .  Delayed T1/2 emptying.  =====================  Reference Range:  Gastric emptying  - 30 minutes: <70% of retention (> 30% emptying) suggests abnormally     fast emptying.  - 60 minutes: <90% retention (>10% emptying) is normal; less than 30%     retention (>70% emptying) suggests abnormally rapid emptying.  - 90  minutes: <65% retention (> 35% emptying) is normal.  - 120 minutes: <60% retention (> 40% emptying) is normal.  - 180 minutes: <30% retention (> 70% emptying) is normal.     Gastric emptying T-1/2:  Solid: The normal range is  minutes  Liquid only: Normal range is 10-45 minutes.  Liquid only-children: At 60 minutes, normal range is 44-58 % .  Liquid only-infants: At 60 minutes, normal range is 32-64 %.    Upper GI 7/15/21                                                                     IMPRESSION:  1.  Small sliding hiatal hernia. Otherwise normal upper GI.    VIDEO SPEECH EVALUATION WITH OR WITHOUT ESOPHAGRAM  11/17/2020 11:26  AM      HISTORY: Dysphonia.     COMPARISON: None.     FLUOROSCOPY TIME: 0.7 minutes.     SPOT FILMS: 4     TECHNIQUE: A modified barium swallow is performed with speech  pathology. Note that only the cervical esophagus was evaluated in  lateral view.                                                                      IMPRESSION: No penetration or aspiration with any tested consistency  of barium.    Impression:               - Tortuous distal esophagus.                             - Mildly severe reflux esophagitis in the distal                             esophagus (c/w LA grade A). Biopsied.                             - Small sliding type hiatal hernia.                             - Gastroesophageal flap valve classified as Hill                             Grade III (minimal fold, loose to endoscope, hiatal                             hernia likely).                             - Non-bleeding erosive gastropathy. Biopsied.                             - Erythematous mucosa in the gastric body. Biopsied.                             - Normal examined duodenum.   Recommendation:           - Await pathology results.                             - Return to referring physician after studies are                             complete.                             - Weight loss.              "                - Continue omeprazole.   SPECIMEN(S):   A: Antral biopsy   B: Stomach body biopsy   C: Esophageal biopsy at 34 cm     FINAL DIAGNOSIS:   A.  Stomach, mucosal biopsy:   - Benign antral gastric mucosa with mild reactive change in glandular   epithelium and minimal inflammation,   suggestive of reactive gastropathy.   - Immunohistochemical antibody assay for Helicobacter pending.     B.  Stomach, body, mucosal biopsy:   - Normal oxyntic gastric mucosa.   - Immunohistochemical antibody assay for Helicobacter pending.     C.  Esophagus at 34 cm, mucosal biopsy:   - Normal mature nonkeratinizing squamous epithelium.      FIB-4 score: 0.31 [email or web address]://[email or web address]/[email or web address]    Interpretation:  Using a lower cutoff value of 1.45, a FIB-4 score <1.45 had a negative predictive value of 90% for advanced fibrosis (Awais fibrosis score 4-6 which includes early bridging fibrosis to cirrhosis). In contrast, a FIB-4 >3.25 would have a 97% specificity and a positive predictive value of 65% for advanced fibrosis. In the patient cohort in which this formula was first validated, at least 70% patients had values <1.45 or >3.25. Authors argued that these individuals could potentially have avoided liver biopsy with an overall accuracy of 86%.    PHYSICAL EXAM:  Objective    Ht 1.6 m (5' 3\")   Wt 112 kg (247 lb)   LMP 04/15/2016 (Exact Date)   BMI 43.75 kg/m    Vitals - Patient Reported  Pain Score: No Pain (0)        Physical Exam   GENERAL: Healthy, alert and no distress  EYES: Eyes grossly normal to inspection.  No discharge or erythema, or obvious scleral/conjunctival abnormalities.  RESP: No audible wheeze, cough, or visible cyanosis.  No visible retractions or increased work of breathing.    SKIN: Visible skin clear. No significant rash, abnormal pigmentation or lesions.  NEURO: Cranial nerves grossly intact.  Mentation and speech appropriate for age.  PSYCH: Mentation appears normal, " affect normal/bright, judgement and insight intact, normal speech and appearance well-groomed.      In summary, Swetha Patterson has Class III obesity with a body mass index of Body mass index is 43.75 kg/m . kg/m2 and the comorbidities stated above. She completed an informational seminar and is a possible candidate for the laparoscopic gastric sleeve.  She will have to complete the following pre-requisites:        If you have not already watched our online seminar please go to [email or web address]    Weight loss requirement: 10 prior to surgery. Will have final weight check 2-3 weeks prior to surgery at anesthesia or nurse pre-op teaching visit.    -Need current weight confirmation at primary clinic or weight management clinic No    Bariatric labs ordered, call for a lab only appointment at any [facility] lab. To find a lab location near you, please call [phone]. Please let us know if orders need to be faxed to a non [facility] lab.    Schedule bariatric psych eval as soon as possible.  List of psychologists will be sent to you via [employer] or given to you in clinic.     Call Jonny Garber at [phone] to discuss insurance coverage for bariatric surgery.  Please check with your insurance regarding bariatric surgery coverage also. Jonny can also help you with scheduling psych eval if you are having difficulties.    The following clearance letters are needed: Letters will be sent to you via [employer] or given to you in clinic  - Letter of support from primary care provider. Provider can submit through electronic medical record or fax to [phone]  - sleep  - pulmonary(COPD)  - cardiology   - letter from therapist    Smoking cessation and nicotine test needed: Yes      NEXT VISITS: A  should reach out to you to schedule the following appointments.  If they do not reach you please call [phone] to schedule the following appointments:    -See dietitian in 1 month and monthly  for 3 months    -See [facility] pharmacist in 1 month to follow up on weight loss medications    -See Rashmi in 3 months to follow up on pre-op weight loss and weight loss medications    -See Dr Leach after psych eval and nicotine cessation to discuss sleeve gastrectomy.    Today in the office we discussed gastric sleeve surgery. Preoperative, perioperative, and postoperative processes, management, and follow up were addressed.  Risks and benefits were outlined including the risk of death, staple line leak (1-2%), PE, DVT, ulcer, worsening GERD, N/V, stricture, hernia, wound infection, weight regain, and vitamin deficiencies. I emphasized exercise and activity along with appropriate food choice as the main foundation for weight loss with surgery providing surgical reinforcement of this.  All questions were answered.  A goal sheet and support group handout were given to the patient.        Once the patient has completed the requirements in their task list and there are no further recommendations, the pt will be allowed to see the surgeon of her choice for consultation on the laparoscopic gastric sleeve surgery. Patient verbalizes understanding of the process to surgery and expectations for the postoperative period including the need for lifelong lifestyle changes, vitamin supplementation, and laboratory monitoring.    Sincerely,     Rashmi Del Rio PA-C

## 2022-01-20 NOTE — NURSING NOTE
"Chief Complaint   Patient presents with     Consult     Consultation Bariatric Surgery here to disscuss surgery       Vitals:    01/20/22 0859   Weight: 112 kg (247 lb)   Height: 1.6 m (5' 3\")       Body mass index is 43.75 kg/m .                          "

## 2022-01-20 NOTE — Clinical Note
See RD in 1 month and monthly until surgery  See [facility] pharmacist Hollie phone visit for smoking cessation and follow up naltrexone start possibly  See Rashmi in 3 months pre bariatric surgery visit video

## 2022-01-21 ENCOUNTER — TELEPHONE (OUTPATIENT)
Dept: ENDOCRINOLOGY | Facility: CLINIC | Age: 52
End: 2022-01-21
Payer: COMMERCIAL

## 2022-01-21 NOTE — TELEPHONE ENCOUNTER
Sarah Caraballo to know the earliest lab appt she could get was next Wed 26th.    [phone]  **OK to leave detailed VM

## 2022-01-21 NOTE — TELEPHONE ENCOUNTER
No diagnosis attached and nothing on PL that can be associated with medication  Solomon HALL RN, BSN

## 2022-01-22 RX ORDER — CETIRIZINE HYDROCHLORIDE 10 MG/1
TABLET ORAL
Qty: 90 TABLET | Refills: 3 | Status: SHIPPED | OUTPATIENT
Start: 2022-01-22 | End: 2023-01-30

## 2022-01-24 ENCOUNTER — LAB (OUTPATIENT)
Dept: LAB | Facility: CLINIC | Age: 52
End: 2022-01-24
Payer: COMMERCIAL

## 2022-01-24 ENCOUNTER — TELEPHONE (OUTPATIENT)
Dept: SURGERY | Facility: CLINIC | Age: 52
End: 2022-01-24

## 2022-01-24 DIAGNOSIS — E66.01 CLASS 3 SEVERE OBESITY DUE TO EXCESS CALORIES WITH SERIOUS COMORBIDITY AND BODY MASS INDEX (BMI) OF 40.0 TO 44.9 IN ADULT (H): ICD-10-CM

## 2022-01-24 DIAGNOSIS — E66.813 CLASS 3 SEVERE OBESITY DUE TO EXCESS CALORIES WITH SERIOUS COMORBIDITY AND BODY MASS INDEX (BMI) OF 40.0 TO 44.9 IN ADULT (H): ICD-10-CM

## 2022-01-24 LAB
ALBUMIN SERPL-MCNC: 3.6 G/DL (ref 3.4–5)
ALP SERPL-CCNC: 156 U/L (ref 40–150)
ALT SERPL W P-5'-P-CCNC: 89 U/L (ref 0–50)
ANION GAP SERPL CALCULATED.3IONS-SCNC: 5 MMOL/L (ref 3–14)
AST SERPL W P-5'-P-CCNC: 47 U/L (ref 0–45)
BILIRUB SERPL-MCNC: 0.4 MG/DL (ref 0.2–1.3)
BUN SERPL-MCNC: 16 MG/DL (ref 7–30)
CALCIUM SERPL-MCNC: 9.6 MG/DL (ref 8.5–10.1)
CHLORIDE BLD-SCNC: 104 MMOL/L (ref 94–109)
CHOLEST SERPL-MCNC: 167 MG/DL
CO2 SERPL-SCNC: 26 MMOL/L (ref 20–32)
CREAT SERPL-MCNC: 0.85 MG/DL (ref 0.52–1.04)
ERYTHROCYTE [DISTWIDTH] IN BLOOD BY AUTOMATED COUNT: 16.2 % (ref 10–15)
FASTING STATUS PATIENT QL REPORTED: YES
GFR SERPL CREATININE-BSD FRML MDRD: 82 ML/MIN/1.73M2
GLUCOSE BLD-MCNC: 126 MG/DL (ref 70–99)
HBA1C MFR BLD: 5.8 % (ref 0–5.6)
HCT VFR BLD AUTO: 44.5 % (ref 35–47)
HDLC SERPL-MCNC: 42 MG/DL
HGB BLD-MCNC: 13.9 G/DL (ref 11.7–15.7)
LDLC SERPL CALC-MCNC: 80 MG/DL
MCH RBC QN AUTO: 27.1 PG (ref 26.5–33)
MCHC RBC AUTO-ENTMCNC: 31.2 G/DL (ref 31.5–36.5)
MCV RBC AUTO: 87 FL (ref 78–100)
NONHDLC SERPL-MCNC: 125 MG/DL
PLATELET # BLD AUTO: 379 10E3/UL (ref 150–450)
POTASSIUM BLD-SCNC: 4 MMOL/L (ref 3.4–5.3)
PROT SERPL-MCNC: 7.7 G/DL (ref 6.8–8.8)
RBC # BLD AUTO: 5.12 10E6/UL (ref 3.8–5.2)
SODIUM SERPL-SCNC: 135 MMOL/L (ref 133–144)
TRIGL SERPL-MCNC: 227 MG/DL
WBC # BLD AUTO: 6.7 10E3/UL (ref 4–11)

## 2022-01-24 PROCEDURE — 80053 COMPREHEN METABOLIC PANEL: CPT

## 2022-01-24 PROCEDURE — 83036 HEMOGLOBIN GLYCOSYLATED A1C: CPT

## 2022-01-24 PROCEDURE — 82040 ASSAY OF SERUM ALBUMIN: CPT

## 2022-01-24 PROCEDURE — 85027 COMPLETE CBC AUTOMATED: CPT

## 2022-01-24 PROCEDURE — 83970 ASSAY OF PARATHORMONE: CPT

## 2022-01-24 PROCEDURE — 82306 VITAMIN D 25 HYDROXY: CPT

## 2022-01-24 PROCEDURE — 36415 COLL VENOUS BLD VENIPUNCTURE: CPT

## 2022-01-24 PROCEDURE — 80061 LIPID PANEL: CPT

## 2022-01-24 NOTE — TELEPHONE ENCOUNTER
Reason for call:  Other   Patient called regarding (reason for call): call back  Additional comments: pt has questions about her lab results  Phone number to reach patient:  Home number on file [phone] (home)    Best Time:      Can we leave a detailed message on this number?  YES    Travel screening: Not Applicable

## 2022-01-25 LAB
DEPRECATED CALCIDIOL+CALCIFEROL SERPL-MC: 54 UG/L (ref 20–75)
PTH-INTACT SERPL-MCNC: 23 PG/ML

## 2022-01-25 NOTE — TELEPHONE ENCOUNTER
Explained labs to patient.  Patient states she has lost 4 pounds and has started to measure her food and make good dietary changes.    Challenged patient to get active if boredom sets in.  Encouraged walking in place, chair exercises.  Also encouraged journaling and to talk to psychiatrist.

## 2022-01-26 ENCOUNTER — TELEPHONE (OUTPATIENT)
Dept: INTERNAL MEDICINE | Facility: CLINIC | Age: 52
End: 2022-01-26

## 2022-01-26 DIAGNOSIS — F41.9 ANXIETY: ICD-10-CM

## 2022-01-26 DIAGNOSIS — I10 HTN, GOAL BELOW 140/90: ICD-10-CM

## 2022-01-26 NOTE — TELEPHONE ENCOUNTER
Patient asking for higher dose of dexter D. She says her labs are still showing very low.    She is taking 5000 units 3x weekly. She is laso      Liver has changed a lot since last lab as well. Patient is receiving this info from virtual dietician appointment.

## 2022-01-27 RX ORDER — LISINOPRIL 20 MG/1
TABLET ORAL
Qty: 90 TABLET | Refills: 0 | Status: SHIPPED | OUTPATIENT
Start: 2022-01-27 | End: 2022-07-19

## 2022-01-27 RX ORDER — HYDROXYZINE HYDROCHLORIDE 50 MG/1
TABLET, FILM COATED ORAL
Qty: 70 TABLET | Refills: 1 | Status: SHIPPED | OUTPATIENT
Start: 2022-01-27 | End: 2022-02-21

## 2022-01-28 ENCOUNTER — TELEPHONE (OUTPATIENT)
Dept: INTERNAL MEDICINE | Facility: CLINIC | Age: 52
End: 2022-01-28
Payer: COMMERCIAL

## 2022-01-28 NOTE — TELEPHONE ENCOUNTER
Call to patient. Advised. Please also comment on liver labs as patient was told that her labs have gotten worse which shows that she has a worsening fatty liver. States the dietician suggested a liver biopsy.

## 2022-01-28 NOTE — TELEPHONE ENCOUNTER
"NNEKA Oswald ([phone]) with [facility] calls. Patient reports she was told to start taking a multivitamin daily. Reviewed patient's chart, per 1/20/22 Virtual Visit notes with terri Henning:   \"Relating to dietary supplements:  Start a multivitamin containing iron daily\"    Patient was referred to weight management clinic and Nutrition by Dr. Harris with [facility]. Margret asks if Dr. Low can provide verbal order for patient to take daily multivitamin with iron. Please call Margret back with update, okay to leave detailed voice message if she does not answer.     Brenda Franks RN  [facility]   "

## 2022-01-31 ENCOUNTER — TELEPHONE (OUTPATIENT)
Dept: INTERNAL MEDICINE | Facility: CLINIC | Age: 52
End: 2022-01-31
Payer: COMMERCIAL

## 2022-01-31 DIAGNOSIS — Z79.899 POLYPHARMACY: Primary | ICD-10-CM

## 2022-01-31 DIAGNOSIS — J30.89 DUST ALLERGY: ICD-10-CM

## 2022-01-31 DIAGNOSIS — J44.9 COPD, MODERATE (H): ICD-10-CM

## 2022-01-31 NOTE — TELEPHONE ENCOUNTER
Ajit calling from [facility] ([phone]- secure line/okay to leave detailed message).    Ajit calling for verbal orders for ongoing weekly skilled nursing visits. Once a week skilled nursing visits.     Ajit also wants provider to be aware that patient is taking:  Lisinopril: Patient is taking 1/2 tablet once a day, not 1 tablet once a day.   Omeprazole: Take off 40 mg- patient is taking 20 mg by mouth BID    Ajit would like patient's medication list updated to reflect the above.     Jessica HALL RN   [facility]

## 2022-02-01 ENCOUNTER — TELEPHONE (OUTPATIENT)
Dept: ENDOCRINOLOGY | Facility: CLINIC | Age: 52
End: 2022-02-01
Payer: COMMERCIAL

## 2022-02-01 RX ORDER — MONTELUKAST SODIUM 10 MG/1
TABLET ORAL
Qty: 90 TABLET | Refills: 0 | Status: SHIPPED | OUTPATIENT
Start: 2022-02-01 | End: 2022-04-29

## 2022-02-01 NOTE — TELEPHONE ENCOUNTER
LVM regarding:    Pt needs 1 hour appointment In person with Thuy for Fibro Scan. This is an in clinic diagnostic tool that looks at the health of the liver. If pt has questions please call [place] [facility] in clinic today (02/01/2022) and got the ok to call over there.

## 2022-02-01 NOTE — TELEPHONE ENCOUNTER
I have not heard of a dietitian to recommend an invasive procedure    The patient has been evaluated by GI multiple times

## 2022-02-01 NOTE — TELEPHONE ENCOUNTER
Patient needs [facility] appointment for the updated med list. Ref made    I agree with [facility]

## 2022-02-01 NOTE — TELEPHONE ENCOUNTER
Patient's home/cell number message on machine to call back.  See also, 1- Medication question encounter.

## 2022-02-01 NOTE — TELEPHONE ENCOUNTER
Patient's home/cell number message on machine to call back.  See also, 1- lab questions encounter.

## 2022-02-01 NOTE — TELEPHONE ENCOUNTER
Call to NNEKA Fong Case Manager with [facility] at [phone]. Detailed message left with Dr. Low's below response.     Call to patient. Message left for return call to clinic.     When patient returns call, please inform them of the need for a [facility] appointment to have medication list updated per her home care's request. Dr. Low has placed a referral for patient to [facility].     Jessica HALL RN   [facility]

## 2022-02-02 ENCOUNTER — OFFICE VISIT (OUTPATIENT)
Dept: CARDIOLOGY | Facility: CLINIC | Age: 52
End: 2022-02-02
Payer: COMMERCIAL

## 2022-02-02 ENCOUNTER — TELEPHONE (OUTPATIENT)
Dept: INTERNAL MEDICINE | Facility: CLINIC | Age: 52
End: 2022-02-02

## 2022-02-02 VITALS
BODY MASS INDEX: 42.89 KG/M2 | WEIGHT: 242.1 LBS | DIASTOLIC BLOOD PRESSURE: 60 MMHG | HEART RATE: 87 BPM | SYSTOLIC BLOOD PRESSURE: 106 MMHG | OXYGEN SATURATION: 96 % | HEIGHT: 63 IN

## 2022-02-02 DIAGNOSIS — E66.813 CLASS 3 SEVERE OBESITY DUE TO EXCESS CALORIES WITH SERIOUS COMORBIDITY AND BODY MASS INDEX (BMI) OF 40.0 TO 44.9 IN ADULT (H): ICD-10-CM

## 2022-02-02 DIAGNOSIS — E66.01 CLASS 3 SEVERE OBESITY DUE TO EXCESS CALORIES WITH SERIOUS COMORBIDITY AND BODY MASS INDEX (BMI) OF 40.0 TO 44.9 IN ADULT (H): ICD-10-CM

## 2022-02-02 PROCEDURE — 99213 OFFICE O/P EST LOW 20 MIN: CPT | Performed by: INTERNAL MEDICINE

## 2022-02-02 RX ORDER — CLONAZEPAM 0.5 MG/1
0.5 TABLET ORAL DAILY PRN
Status: ON HOLD | COMMUNITY
Start: 2022-01-03 | End: 2023-06-26

## 2022-02-02 RX ORDER — LOTEPREDNOL ETABONATE 2 MG/ML
SUSPENSION/ DROPS OPHTHALMIC
COMMUNITY
Start: 2022-01-19 | End: 2022-02-16

## 2022-02-02 ASSESSMENT — MIFFLIN-ST. JEOR: SCORE: 1677.29

## 2022-02-02 NOTE — LETTER
2/2/2022    Roro Chawla MD  [address] [facility] [address]    RE: Swetha Patterson       Dear Colleague,     I had the pleasure of seeing Swetha Patterson in the [facility].  HPI and Plan:   Ms. Patterson is a pleasant 51-year-old female with morbid obesity, peripheral artery disease, COPD, emphysema, GERD being followed by Dr. Tate and Jessica Morales.  Most recently patient was seen by Dr. Lin for atypical chest discomfort.  He recommended Lexiscan stress test which patient underwent about 2 weeks ago which showed normal myocardial perfusion.  LV function was normal, 3 times daily was absent.  Today patient is coming to cardiology clinic to see me as she has not heard back about the results of her stress test.  She was pleased to learn that the stress test was normal.  Her symptoms are longstanding intermittent sharp chest pain symptoms tightness on the left side.  Not related to exertion.  She is now trying to walk quite a bit walking across the mall and has lost 9 pounds by doing so.  No exertional chest discomfort.  She has longstanding exertional shortness of breath.  Patient tells me that she is supposed to get weight loss surgery sometime in May this year.  She has longstanding history of tobacco abuse and is now currently down to 1 cigarette/day.  At peak she was smoking up to 1 pack/day.    Assessment and plan  51-year-old female with morbid obesity, peripheral artery disease, COPD, emphysema, GERD, hypertension who recently went stress test which showed no evidence of inducible ischemia with normal LV function.  We talked at length about importance of staying away from tobacco and I commended her on not only significantly decreasing her tobacco intake but also on improved lifestyle measures including regular physical activity and losing weight as a result.  I  recommend she can see back her primary cardiology team of Dr. Tate and Jessica Morales in future.    Orders  Placed This Encounter   Procedures     Follow-Up with Cardiology LETTY       Orders Placed This Encounter   Medications     ALREX 0.2 % SUSP ophthalmic susp     Sig: INSTILL ONE DROP INTO THE AFFECTED EYES FOUR TIMES A DAY     clonazePAM (KLONOPIN) 0.5 MG tablet     Sig: TAKE ONE TABLET BY MOUTH EVERY DAY AT NOON       There are no discontinued medications.      Encounter Diagnosis   Name Primary?     Class 3 severe obesity due to excess calories with serious comorbidity and body mass index (BMI) of 40.0 to 44.9 in adult (H)        CURRENT MEDICATIONS:  Current Outpatient Medications   Medication Sig Dispense Refill     albuterol (PROVENTIL) (2.5 MG/3ML) 0.083% neb solution Take 1 vial (2.5 mg) by nebulization every 6 hours as needed for shortness of breath / dyspnea or wheezing 25 vial 3     albuterol (VENTOLIN HFA) 108 (90 Base) MCG/ACT inhaler Inhale 2 puffs into the lungs every 6 hours 3 each 3     ALREX 0.2 % SUSP ophthalmic susp INSTILL ONE DROP INTO THE AFFECTED EYES FOUR TIMES A DAY       amLODIPine (NORVASC) 2.5 MG tablet Take 2 tablets (5 mg) by mouth daily 30 tablet 3     atorvastatin (LIPITOR) 80 MG tablet TAKE ONE TABLET BY MOUTH EVERY DAY 90 tablet 0     benzonatate (TESSALON) 200 MG capsule Take 1 capsule (200 mg) by mouth 3 times daily as needed for cough 60 capsule 1     benzoyl peroxide (ACNE-CLEAR) 10 % external gel Apply topically 2 times daily as needed       blood glucose (ACCU-CHEK ALLYSON PLUS) test strip USE TO TEST BLOOD SUGAR ONCE DAILY OR AS DIRECTED 100 strip 3     blood glucose (NO BRAND SPECIFIED) lancets standard Use to test blood sugar 1 times daily or as directed. 100 each 3     blood glucose (NO BRAND SPECIFIED) lancets standard Use to test blood sugar 1 time daily 100 each 1     blood glucose (NO BRAND SPECIFIED) test strip Use to test blood sugar 1 times daily or as directed.  Dispense Accu-chek Allyson Plus or per patient insurance 100 strip 3     blood glucose monitoring (NO BRAND  SPECIFIED) meter device kit Use to test blood sugar 1 times daily or as directed.  Dispense Accu-chek Olinda Plus or per insurance preference 1 kit 0     blood glucose monitoring (NO BRAND SPECIFIED) meter device kit Use to test blood sugar 1 times daily or as directed. 1 kit 0     budesonide-formoterol (SYMBICORT) 160-4.5 MCG/ACT Inhaler Inhale 2 puffs into the lungs 2 times daily 30.6 g 3     buPROPion (WELLBUTRIN XL) 150 MG 24 hr tablet TAKE ONE TABLET BY MOUTH EVERY MORNING 90 tablet 2     cetirizine (ZYRTEC) 10 MG tablet TAKE ONE TABLET BY MOUTH EVERY DAY AS NEEDED 90 tablet 3     cetirizine HCl 10 MG CAPS Take 1 capsule (10 mg) by mouth daily as needed Takes in the spring. 90 capsule 3     clindamycin 1 % EX external gel Apply topically 2 times daily 60 g 3     clonazePAM (KLONOPIN) 0.5 MG tablet TAKE ONE TABLET BY MOUTH EVERY DAY AT NOON       clonazePAM (KLONOPIN) 1 MG tablet Take 1 tablet (1 mg) by mouth 2 times daily as needed for anxiety She needs to see her PCP to get more tablets 5 tablet 0     DULoxetine (CYMBALTA) 20 MG capsule Take 1 capsule (20 mg) by mouth daily X 7 days and if well tolerated increase to 40 mg daily. 53 capsule 1     fluticasone (FLONASE) 50 MCG/ACT nasal spray Spray 2 sprays in nostril daily 16 g 5     Fluticasone-Umeclidin-Vilanterol (TRELEGY ELLIPTA) 100-62.5-25 MCG/INH oral inhaler Inhale 1 puff into the lungs daily 28 each 11     furosemide (LASIX) 20 MG tablet Take 1 tablet (20 mg) by mouth 2 times daily 180 tablet 0     glipiZIDE (GLUCOTROL XL) 2.5 MG 24 hr tablet TAKE ONE TABLET BY MOUTH EVERY DAY 90 tablet 1     hydrocortisone 2.5 % cream APPLY TOPICALLY TO THE AFFECTED AREA(S) TWO TIMES A DAY 30 g 1     hydrOXYzine (ATARAX) 50 MG tablet TAKE TWO TABLETS BY MOUTH THREE TIMES A DAY AS NEEDED FOR ANXIETY 70 tablet 1     lamoTRIgine (LAMICTAL) 150 MG tablet Take 150 mg by mouth daily       lisinopril (ZESTRIL) 20 MG tablet TAKE ONE TABLET BY MOUTH EVERY DAY 90 tablet 0      metFORMIN (GLUCOPHAGE) 500 MG tablet TAKE ONE TABLET BY MOUTH EVERY DAY WITH BREAKFAST 90 tablet 1     montelukast (SINGULAIR) 10 MG tablet TAKE ONE TABLET BY MOUTH AT BEDTIME 90 tablet 0     nicotine (NICODERM CQ) 14 MG/24HR 24 hr patch Place 1 patch onto the skin every 24 hours 28 patch 1     nicotine (NICODERM CQ) 21 MG/24HR 24 hr patch Place 1 patch onto the skin every 24 hours 28 patch 1     nicotine (NICORETTE) 2 MG gum Place 1 each (2 mg) inside cheek every hour as needed for smoking cessation 240 each 1     nystatin (MYCOSTATIN) 632239 UNIT/GM external powder APPLY TOPICALLY TWICE A DAY AS NEEDED SKIN RASH 45 g 3     nystatin (MYCOSTATIN) 353695 UNIT/ML suspension TAKE 5 ML BY MOUTH FOUR TIMES A  mL 0     omeprazole (PRILOSEC) 20 MG DR capsule TAKE ONE CAPSULE BY MOUTH TWO TIMES A  capsule 1     omeprazole (PRILOSEC) 40 MG DR capsule TAKE ONE CAPSULE BY MOUTH TWICE A  capsule 0     ondansetron (ZOFRAN) 8 MG tablet TAKE ONE-HALF TO ONE TABLET BY MOUTH EVERY 8 HOURS AS NEEDED FOR NAUSEA 30 tablet 3     ondansetron (ZOFRAN-ODT) 4 MG ODT tab Take 1 tablet (4 mg) by mouth every 6 hours as needed for nausea 12 tablet 1     polyethylene glycol (MIRALAX) 17 GM/Dose powder Take 17 g (1 capful) by mouth daily 507 g 1     prochlorperazine (COMPAZINE) 10 MG tablet Take 1 tablet (10 mg) by mouth every 8 hours as needed for nausea or vomiting 30 tablet 0     SM MUCUS RELIEF 600 MG 12 hr tablet TAKE TWO TABLET BY MOUTH TWO TIMES DAILY 30 tablet 0     sulfamethoxazole-trimethoprim (BACTRIM DS) 800-160 MG tablet Take 1 tablet by mouth 2 times daily Take one tablet twice daily. For additional refills, please schedule a follow-up appointment. 180 tablet 1     terbinafine (LAMISIL) 1 % external cream APPLY TO AFFECTED AREA(S) TWO TIMES A DAY 30 g 2     VENTOLIN  (90 Base) MCG/ACT inhaler INHALE TWO PUFFS BY MOUTH EVERY 6 HOURS AS NEEDED FOR SHORTNESS OF BREATH 18 g 1     nitroGLYcerin (NITROSTAT) 0.4  "MG sublingual tablet PLACE ONE TABLET UNDER THE TONGUE AT THE 1ST SIGN OF ATTACK. IF PAIN IS UNRELIEVED OR WORSENED 5 MINUTES AFTER 1ST DOSE, PROMPT MEDICAL ASSISTANCE IS NEEDED. MAY REPEAT EVERY 5 MINUTES UNTIL PAIN IS RELIEVED. MAX OF THREE DOSES (Patient not taking: Reported on 2/2/2022) 25 tablet 11       ALLERGIES     Allergies   Allergen Reactions     Codeine Nausea and Vomiting     vomiting     Cyclobenzaprine Nausea     Gabapentin Rash     Hydrocodone Nausea and Vomiting     Sulfa Drugs Nausea and Vomiting     Nausea       PAST MEDICAL HISTORY:  Past Medical History:   Diagnosis Date     Anxiety state, unspecified      Asthma      Chronic pain     back pain from cyst     Contact dermatitis and other eczema, due to unspecified cause      COPD (chronic obstructive pulmonary disease) (H)      Depressive disorder, not elsewhere classified      Diabetes (H)      Emphysema with chronic bronchitis (H)      Esophageal reflux      Family history of ischemic heart disease      Fibromyalgia      Gastro-oesophageal reflux disease      Heart disease     has chest pain sometimes     History of emphysema      Hoarseness      HTN, goal below 140/90      Hyperlipidemia LDL goal <130      Liver disease     \"fatty liver\"     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)      PONV (postoperative nausea and vomiting)      Rectal bleeding        PAST SURGICAL HISTORY:  Past Surgical History:   Procedure Laterality Date     ANESTHESIA OUT OF OR MRI N/A 10/7/2021    Procedure: ANESTHESIA OUT OF OR MRI;  Surgeon: GENERIC ANESTHESIA PROVIDER;  Location: [facility] OR     ARTHROSCOPY SHOULDER DECOMPRESSION Left 10/21/2015    Procedure: ARTHROSCOPY SHOULDER DECOMPRESSION;  Surgeon: Julien Milian MD;  Location: [facility] OR     BIOPSY ARTERY TEMPORAL Left 3/11/2020    Procedure: LEFT TEMPORAL ARTERY BIOPSY;  Surgeon: Ibeth Conner MD;  Location: [facility] OR     BRONCHIAL THERMOPLASTY N/A 11/14/2014    Procedure: BRONCHIAL THERMOPLASTY;  Surgeon: " Ward Whitaker MD;  Location: [facility]     BRONCHIAL THERMOPLASTY N/A 12/19/2014    Procedure: BRONCHIAL THERMOPLASTY;  Surgeon: Ward Whitaker MD;  [facility]     BRONCHIAL THERMOPLASTY N/A 2/6/2015    Procedure: BRONCHIAL THERMOPLASTY;  Surgeon: Ward Whitaker MD;  [facility]: [facility]     COLONOSCOPY N/A 11/26/2018    Procedure: COLONOSCOPY ([facility]);  Surgeon: Marshall Oakes MD;  Location:  OR     COLONOSCOPY N/A 10/22/2020    Procedure: Colonoscopy;  Surgeon: Marshall Mccormick MD;  Location: [facility] OR     DISCECTOMY, FUSION CERVICAL ANTERIOR ONE LEVEL, COMBINED N/A 5/1/2018    Procedure: COMBINED DISCECTOMY, FUSION CERVICAL ANTERIOR ONE LEVEL;  1.  C5-C6 anterior cervical diskectomy and fusion.    2.  C5-C6 application of intervertebral biomechanical device for interbody fusion purposes.    3.  C5-C6 anterior instrumentation using the standard Kristin InViZia 24 mm plate with four associated bone screws, 12 mm in length.  Inferior screws are fixed.  Superior screws are va     ENT SURGERY  2015    polyps removed from vocal cords      ESOPHAGOSCOPY, GASTROSCOPY, DUODENOSCOPY (EGD), COMBINED N/A 10/22/2020    Procedure: Esophagoscopy, gastroscopy, duodenoscopy with biopsies;  Surgeon: Marshall Mccormick MD;  Location:  OR     ESOPHAGOSCOPY, GASTROSCOPY, DUODENOSCOPY (EGD), COMBINED N/A 6/17/2021    Procedure: ESOPHAGOGASTRODUODENOSCOPY, WITH BIOPSY;  Surgeon: Darrel Damon MD;  Location:  GI     EXCISE MASS TRUNK Left 11/3/2021    Procedure: EXCISION LEFT SHOULDER LIPOMA;  Surgeon: Ibeth Conner MD;  Location:  OR     EXCISE NODE MEDIASTINAL  4/26/2013    Procedure: EXCISE NODE MEDIASTINAL;;  Surgeon: Av Peña MD;  Location:  OR     LAPAROSCOPIC CHOLECYSTECTOMY N/A 10/19/2017    Procedure: LAPAROSCOPIC CHOLECYSTECTOMY;  LAPAROSCOPIC CHOLECYSTECTOMY;  Surgeon: Ney Jerry MD;  Location:  OR     THORACOSCOPY  4/26/2013    Procedure: THORACOSCOPY;  LEFT  "VIDEO ASSISTED THORACOSCOPY, RESECTION OF POSTERIOR MEDIASTINAL MASS;  Surgeon: Av Peña MD;  Location: [facility] OR     TONSILLECTOMY  as a kid     TONSILLECTOMY       ZZC APPENDECTOMY  at age 17       FAMILY HISTORY:  Family History   Problem Relation Age of Onset     Heart Disease Mother         murmur, mi     Hypertension Mother      Cerebrovascular Disease Mother      Depression Mother      Gallbladder Disease Mother      Asthma Mother      Family History Negative Father         does not know  him     Family History Negative Brother      Heart Disease Maternal Grandfather      Asthma Maternal Grandfather      Hypertension Maternal Grandfather      Cerebrovascular Disease Maternal Grandfather      Diabetes Maternal Grandfather      Asthma Sister      Hypertension Sister      Cerebrovascular Disease Sister      Hypertension Maternal Grandmother      Cerebrovascular Disease Maternal Grandmother        SOCIAL HISTORY:  Social History     Socioeconomic History     Marital status:      Spouse name: None     Number of children: None     Years of education: None     Highest education level: None   Occupational History     None   Tobacco Use     Smoking status: Former Smoker     Packs/day: 0.50     Years: 30.00     Pack years: 15.00     Types: Cigarettes     Start date: 1/1/1985     Smokeless tobacco: Former User     Quit date: 1/6/2022     Tobacco comment: smokes 1 cigarettes a day - in the process of trying to quit   Vaping Use     Vaping Use: Never used   Substance and Sexual Activity     Alcohol use: Not Currently     Comment: \"once in a blue moon\", none for five months     Drug use: No     Sexual activity: Not Currently     Partners: Male     Birth control/protection: None   Other Topics Concern      Service Not Asked     Blood Transfusions Not Asked     Caffeine Concern No     Comment: 4-5 cans of pop daily     Occupational Exposure Not Asked     Hobby Hazards Not Asked     Sleep Concern " "Not Asked     Stress Concern Not Asked     Weight Concern Not Asked     Special Diet No     Back Care Not Asked     Exercise No     Comment: on hold     Bike Helmet Not Asked     Seat Belt Not Asked     Self-Exams Not Asked     Parent/sibling w/ CABG, MI or angioplasty before 65F 55M? Not Asked   Social History Narrative     None     Social Determinants of Health     Financial Resource Strain: Not on file   Food Insecurity: Not on file   Transportation Needs: Not on file   Physical Activity: Not on file   Stress: Not on file   Social Connections: Not on file   Intimate Partner Violence: Not on file   Housing Stability: Not on file       Review of Systems:  Skin:  Positive for   eczema   Eyes:  Negative      ENT:  Negative      Respiratory:  Positive for dyspnea on exertion;cough;wheezing COPD   Cardiovascular:    Intermittent chest discomfort for a long time he is like sharp sometimes tightness nonexertional in nature.  Gastroenterology: Positive for abdominal pain hiatal hernia  Genitourinary:  Negative      Musculoskeletal:  Positive for neck pain;back pain pain in legs  Neurologic:  Positive for headaches;numbness or tingling of feet;numbness or tingling of hands    Psychiatric:  Positive for anxiety;excessive stress panick attacks  Heme/Lymph/Imm:  Positive for allergies RX  Endocrine:  Positive for diabetes      Physical Exam:  Vitals: /60 (BP Location: Right arm, Patient Position: Sitting, Cuff Size: Adult Large)   Pulse 87   Ht 1.6 m (5' 3\")   Wt 109.8 kg (242 lb 1.6 oz)   LMP 04/15/2016 (Exact Date)   SpO2 96%   BMI 42.89 kg/m      General patient appears comfortable  Neck normal JVP  Cardiovascular system S1-S2 normal no murmur rub or gallop  Respiratory system clear to auscultation  Extremities no edema  Neurological alert, oriented      CC  Rashmi Del Rio PA-C  [address] [facility] [address]  [place] [address]    Thank you for allowing me to participate in the care of your " patient.      Sincerely,     Allan Farris MD     [facility]

## 2022-02-02 NOTE — PROGRESS NOTES
HPI and Plan:   Ms. Patterson is a pleasant 51-year-old female with morbid obesity, peripheral artery disease, COPD, emphysema, GERD being followed by Dr. Tate and Jessica Morales.  Most recently patient was seen by Dr. Lin for atypical chest discomfort.  He recommended Lexiscan stress test which patient underwent about 2 weeks ago which showed normal myocardial perfusion.  LV function was normal, 3 times daily was absent.  Today patient is coming to cardiology clinic to see me as she has not heard back about the results of her stress test.  She was pleased to learn that the stress test was normal.  Her symptoms are longstanding intermittent sharp chest pain symptoms tightness on the left side.  Not related to exertion.  She is now trying to walk quite a bit walking [place] the mall and has lost 9 pounds by doing so.  No exertional chest discomfort.  She has longstanding exertional shortness of breath.  Patient tells me that she is supposed to get weight loss surgery sometime in May this year.  She has longstanding history of tobacco abuse and is now currently down to 1 cigarette/day.  At peak she was smoking up to 1 pack/day.    Assessment and plan  51-year-old female with morbid obesity, peripheral artery disease, COPD, emphysema, GERD, hypertension who recently went stress test which showed no evidence of inducible ischemia with normal LV function.  We talked at length about importance of staying away from tobacco and I commended her on not only significantly decreasing her tobacco intake but also on improved lifestyle measures including regular physical activity and losing weight as a result.  I  recommend she can see back her primary cardiology team of Dr. Tate and Jessica Morales in future.    Orders Placed This Encounter   Procedures     Follow-Up with Cardiology LETTY       Orders Placed This Encounter   Medications     ALREX 0.2 % SUSP ophthalmic susp     Sig: INSTILL ONE DROP INTO THE AFFECTED EYES FOUR TIMES A DAY      clonazePAM (KLONOPIN) 0.5 MG tablet     Sig: TAKE ONE TABLET BY MOUTH EVERY DAY AT NOON       There are no discontinued medications.      Encounter Diagnosis   Name Primary?     Class 3 severe obesity due to excess calories with serious comorbidity and body mass index (BMI) of 40.0 to 44.9 in adult (H)        CURRENT MEDICATIONS:  Current Outpatient Medications   Medication Sig Dispense Refill     albuterol (PROVENTIL) (2.5 MG/3ML) 0.083% neb solution Take 1 vial (2.5 mg) by nebulization every 6 hours as needed for shortness of breath / dyspnea or wheezing 25 vial 3     albuterol (VENTOLIN HFA) 108 (90 Base) MCG/ACT inhaler Inhale 2 puffs into the lungs every 6 hours 3 each 3     ALREX 0.2 % SUSP ophthalmic susp INSTILL ONE DROP INTO THE AFFECTED EYES FOUR TIMES A DAY       amLODIPine (NORVASC) 2.5 MG tablet Take 2 tablets (5 mg) by mouth daily 30 tablet 3     atorvastatin (LIPITOR) 80 MG tablet TAKE ONE TABLET BY MOUTH EVERY DAY 90 tablet 0     benzonatate (TESSALON) 200 MG capsule Take 1 capsule (200 mg) by mouth 3 times daily as needed for cough 60 capsule 1     benzoyl peroxide (ACNE-CLEAR) 10 % external gel Apply topically 2 times daily as needed       blood glucose (ACCU-CHEK ALLYSON PLUS) test strip USE TO TEST BLOOD SUGAR ONCE DAILY OR AS DIRECTED 100 strip 3     blood glucose (NO BRAND SPECIFIED) lancets standard Use to test blood sugar 1 times daily or as directed. 100 each 3     blood glucose (NO BRAND SPECIFIED) lancets standard Use to test blood sugar 1 time daily 100 each 1     blood glucose (NO BRAND SPECIFIED) test strip Use to test blood sugar 1 times daily or as directed.  Dispense Accu-chek Allyson Plus or per patient insurance 100 strip 3     blood glucose monitoring (NO BRAND SPECIFIED) meter device kit Use to test blood sugar 1 times daily or as directed.  Dispense Accu-chek Allyson Plus or per insurance preference 1 kit 0     blood glucose monitoring (NO BRAND SPECIFIED) meter device kit Use to  test blood sugar 1 times daily or as directed. 1 kit 0     budesonide-formoterol (SYMBICORT) 160-4.5 MCG/ACT Inhaler Inhale 2 puffs into the lungs 2 times daily 30.6 g 3     buPROPion (WELLBUTRIN XL) 150 MG 24 hr tablet TAKE ONE TABLET BY MOUTH EVERY MORNING 90 tablet 2     cetirizine (ZYRTEC) 10 MG tablet TAKE ONE TABLET BY MOUTH EVERY DAY AS NEEDED 90 tablet 3     cetirizine HCl 10 MG CAPS Take 1 capsule (10 mg) by mouth daily as needed Takes in the spring. 90 capsule 3     clindamycin 1 % EX external gel Apply topically 2 times daily 60 g 3     clonazePAM (KLONOPIN) 0.5 MG tablet TAKE ONE TABLET BY MOUTH EVERY DAY AT NOON       clonazePAM (KLONOPIN) 1 MG tablet Take 1 tablet (1 mg) by mouth 2 times daily as needed for anxiety She needs to see her PCP to get more tablets 5 tablet 0     DULoxetine (CYMBALTA) 20 MG capsule Take 1 capsule (20 mg) by mouth daily X 7 days and if well tolerated increase to 40 mg daily. 53 capsule 1     fluticasone (FLONASE) 50 MCG/ACT nasal spray Spray 2 sprays in nostril daily 16 g 5     Fluticasone-Umeclidin-Vilanterol (TRELEGY ELLIPTA) 100-62.5-25 MCG/INH oral inhaler Inhale 1 puff into the lungs daily 28 each 11     furosemide (LASIX) 20 MG tablet Take 1 tablet (20 mg) by mouth 2 times daily 180 tablet 0     glipiZIDE (GLUCOTROL XL) 2.5 MG 24 hr tablet TAKE ONE TABLET BY MOUTH EVERY DAY 90 tablet 1     hydrocortisone 2.5 % cream APPLY TOPICALLY TO THE AFFECTED AREA(S) TWO TIMES A DAY 30 g 1     hydrOXYzine (ATARAX) 50 MG tablet TAKE TWO TABLETS BY MOUTH THREE TIMES A DAY AS NEEDED FOR ANXIETY 70 tablet 1     lamoTRIgine (LAMICTAL) 150 MG tablet Take 150 mg by mouth daily       lisinopril (ZESTRIL) 20 MG tablet TAKE ONE TABLET BY MOUTH EVERY DAY 90 tablet 0     metFORMIN (GLUCOPHAGE) 500 MG tablet TAKE ONE TABLET BY MOUTH EVERY DAY WITH BREAKFAST 90 tablet 1     montelukast (SINGULAIR) 10 MG tablet TAKE ONE TABLET BY MOUTH AT BEDTIME 90 tablet 0     nicotine (NICODERM CQ) 14 MG/24HR  24 hr patch Place 1 patch onto the skin every 24 hours 28 patch 1     nicotine (NICODERM CQ) 21 MG/24HR 24 hr patch Place 1 patch onto the skin every 24 hours 28 patch 1     nicotine (NICORETTE) 2 MG gum Place 1 each (2 mg) inside cheek every hour as needed for smoking cessation 240 each 1     nystatin (MYCOSTATIN) 030674 UNIT/GM external powder APPLY TOPICALLY TWICE A DAY AS NEEDED SKIN RASH 45 g 3     nystatin (MYCOSTATIN) 937151 UNIT/ML suspension TAKE 5 ML BY MOUTH FOUR TIMES A  mL 0     omeprazole (PRILOSEC) 20 MG DR capsule TAKE ONE CAPSULE BY MOUTH TWO TIMES A  capsule 1     omeprazole (PRILOSEC) 40 MG DR capsule TAKE ONE CAPSULE BY MOUTH TWICE A  capsule 0     ondansetron (ZOFRAN) 8 MG tablet TAKE ONE-HALF TO ONE TABLET BY MOUTH EVERY 8 HOURS AS NEEDED FOR NAUSEA 30 tablet 3     ondansetron (ZOFRAN-ODT) 4 MG ODT tab Take 1 tablet (4 mg) by mouth every 6 hours as needed for nausea 12 tablet 1     polyethylene glycol (MIRALAX) 17 GM/Dose powder Take 17 g (1 capful) by mouth daily 507 g 1     prochlorperazine (COMPAZINE) 10 MG tablet Take 1 tablet (10 mg) by mouth every 8 hours as needed for nausea or vomiting 30 tablet 0     SM MUCUS RELIEF 600 MG 12 hr tablet TAKE TWO TABLET BY MOUTH TWO TIMES DAILY 30 tablet 0     sulfamethoxazole-trimethoprim (BACTRIM DS) 800-160 MG tablet Take 1 tablet by mouth 2 times daily Take one tablet twice daily. For additional refills, please schedule a follow-up appointment. 180 tablet 1     terbinafine (LAMISIL) 1 % external cream APPLY TO AFFECTED AREA(S) TWO TIMES A DAY 30 g 2     VENTOLIN  (90 Base) MCG/ACT inhaler INHALE TWO PUFFS BY MOUTH EVERY 6 HOURS AS NEEDED FOR SHORTNESS OF BREATH 18 g 1     nitroGLYcerin (NITROSTAT) 0.4 MG sublingual tablet PLACE ONE TABLET UNDER THE TONGUE AT THE 1ST SIGN OF ATTACK. IF PAIN IS UNRELIEVED OR WORSENED 5 MINUTES AFTER 1ST DOSE, PROMPT MEDICAL ASSISTANCE IS NEEDED. MAY REPEAT EVERY 5 MINUTES UNTIL PAIN IS  "RELIEVED. MAX OF THREE DOSES (Patient not taking: Reported on 2/2/2022) 25 tablet 11       ALLERGIES     Allergies   Allergen Reactions     Codeine Nausea and Vomiting     vomiting     Cyclobenzaprine Nausea     Gabapentin Rash     Hydrocodone Nausea and Vomiting     Sulfa Drugs Nausea and Vomiting     Nausea       PAST MEDICAL HISTORY:  Past Medical History:   Diagnosis Date     Anxiety state, unspecified      Asthma      Chronic pain     back pain from cyst     Contact dermatitis and other eczema, due to unspecified cause      COPD (chronic obstructive pulmonary disease) (H)      Depressive disorder, not elsewhere classified      Diabetes (H)      Emphysema with chronic bronchitis (H)      Esophageal reflux      Family history of ischemic heart disease      Fibromyalgia      Gastro-oesophageal reflux disease      Heart disease     has chest pain sometimes     History of emphysema      Hoarseness      HTN, goal below 140/90      Hyperlipidemia LDL goal <130      Liver disease     \"fatty liver\"     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)      PONV (postoperative nausea and vomiting)      Rectal bleeding        PAST SURGICAL HISTORY:  Past Surgical History:   Procedure Laterality Date     ANESTHESIA OUT OF OR MRI N/A 10/7/2021    Procedure: ANESTHESIA OUT OF OR MRI;  Surgeon: GENERIC ANESTHESIA PROVIDER;  Location: [facility] OR     ARTHROSCOPY SHOULDER DECOMPRESSION Left 10/21/2015    Procedure: ARTHROSCOPY SHOULDER DECOMPRESSION;  Surgeon: Julien Milian MD;  Location: [facility] OR     BIOPSY ARTERY TEMPORAL Left 3/11/2020    Procedure: LEFT TEMPORAL ARTERY BIOPSY;  Surgeon: Ibeth Conner MD;  Location: [facility]     BRONCHIAL THERMOPLASTY N/A 11/14/2014    Procedure: BRONCHIAL THERMOPLASTY;  Surgeon: Ward Whitaker MD;  Location: [facility]     BRONCHIAL THERMOPLASTY N/A 12/19/2014    Procedure: BRONCHIAL THERMOPLASTY;  Surgeon: Ward Whitaker MD;  Location: [facility]     BRONCHIAL THERMOPLASTY N/A 2/6/2015    " Procedure: BRONCHIAL THERMOPLASTY;  Surgeon: Ward Whitaker MD;  Location: [facility]     COLONOSCOPY N/A 11/26/2018    Procedure: COLONOSCOPY (Formerly [facility]);  Surgeon: Marshall Oakes MD;  Location:  OR     COLONOSCOPY N/A 10/22/2020    Procedure: Colonoscopy;  Surgeon: Marshall Mccormick MD;  Location: [facility] OR     DISCECTOMY, FUSION CERVICAL ANTERIOR ONE LEVEL, COMBINED N/A 5/1/2018    Procedure: COMBINED DISCECTOMY, FUSION CERVICAL ANTERIOR ONE LEVEL;  1.  C5-C6 anterior cervical diskectomy and fusion.    2.  C5-C6 application of intervertebral biomechanical device for interbody fusion purposes.    3.  C5-C6 anterior instrumentation using the standard Kristin InViZia 24 mm plate with four associated bone screws, 12 mm in length.  Inferior screws are fixed.  Superior screws are va     ENT SURGERY  2015    polyps removed from vocal cords      ESOPHAGOSCOPY, GASTROSCOPY, DUODENOSCOPY (EGD), COMBINED N/A 10/22/2020    Procedure: Esophagoscopy, gastroscopy, duodenoscopy with biopsies;  Surgeon: Marshall Mccormick MD;  Location:  OR     ESOPHAGOSCOPY, GASTROSCOPY, DUODENOSCOPY (EGD), COMBINED N/A 6/17/2021    Procedure: ESOPHAGOGASTRODUODENOSCOPY, WITH BIOPSY;  Surgeon: Darrel Damon MD;  Location:  GI     EXCISE MASS TRUNK Left 11/3/2021    Procedure: EXCISION LEFT SHOULDER LIPOMA;  Surgeon: Ibeth Conner MD;  Location:  OR     EXCISE NODE MEDIASTINAL  4/26/2013    Procedure: EXCISE NODE MEDIASTINAL;;  Surgeon: Av Peña MD;  Location:  OR     LAPAROSCOPIC CHOLECYSTECTOMY N/A 10/19/2017    Procedure: LAPAROSCOPIC CHOLECYSTECTOMY;  LAPAROSCOPIC CHOLECYSTECTOMY;  Surgeon: Ney Jerry MD;  Location:  OR     THORACOSCOPY  4/26/2013    Procedure: THORACOSCOPY;  LEFT VIDEO ASSISTED THORACOSCOPY, RESECTION OF POSTERIOR MEDIASTINAL MASS;  Surgeon: Av Peña MD;  Location:  OR     TONSILLECTOMY  as a kid     TONSILLECTOMY       ZZC APPENDECTOMY  at age 17       FAMILY  "HISTORY:  Family History   Problem Relation Age of Onset     Heart Disease Mother         murmur, mi     Hypertension Mother      Cerebrovascular Disease Mother      Depression Mother      Gallbladder Disease Mother      Asthma Mother      Family History Negative Father         does not know  him     Family History Negative Brother      Heart Disease Maternal Grandfather      Asthma Maternal Grandfather      Hypertension Maternal Grandfather      Cerebrovascular Disease Maternal Grandfather      Diabetes Maternal Grandfather      Asthma Sister      Hypertension Sister      Cerebrovascular Disease Sister      Hypertension Maternal Grandmother      Cerebrovascular Disease Maternal Grandmother        SOCIAL HISTORY:  Social History     Socioeconomic History     Marital status:      Spouse name: None     Number of children: None     Years of education: None     Highest education level: None   Occupational History     None   Tobacco Use     Smoking status: Former Smoker     Packs/day: 0.50     Years: 30.00     Pack years: 15.00     Types: Cigarettes     Start date: 1/1/1985     Smokeless tobacco: Former User     Quit date: 1/6/2022     Tobacco comment: smokes 1 cigarettes a day - in the process of trying to quit   Vaping Use     Vaping Use: Never used   Substance and Sexual Activity     Alcohol use: Not Currently     Comment: \"once in a blue moon\", none for five months     Drug use: No     Sexual activity: Not Currently     Partners: Male     Birth control/protection: None   Other Topics Concern      Service Not Asked     Blood Transfusions Not Asked     Caffeine Concern No     Comment: 4-5 cans of pop daily     Occupational Exposure Not Asked     Hobby Hazards Not Asked     Sleep Concern Not Asked     Stress Concern Not Asked     Weight Concern Not Asked     Special Diet No     Back Care Not Asked     Exercise No     Comment: on hold     Bike Helmet Not Asked     Seat Belt Not Asked     Self-Exams Not " "Asked     Parent/sibling w/ CABG, MI or angioplasty before 65F 55M? Not Asked   Social History Narrative     None     Social Determinants of Health     Financial Resource Strain: Not on file   Food Insecurity: Not on file   Transportation Needs: Not on file   Physical Activity: Not on file   Stress: Not on file   Social Connections: Not on file   Intimate Partner Violence: Not on file   Housing Stability: Not on file       Review of Systems:  Skin:  Positive for   eczema   Eyes:  Negative      ENT:  Negative      Respiratory:  Positive for dyspnea on exertion;cough;wheezing COPD   Cardiovascular:    Intermittent chest discomfort for a long time he is like sharp sometimes tightness nonexertional in nature.  Gastroenterology: Positive for abdominal pain hiatal hernia  Genitourinary:  Negative      Musculoskeletal:  Positive for neck pain;back pain pain in legs  Neurologic:  Positive for headaches;numbness or tingling of feet;numbness or tingling of hands    Psychiatric:  Positive for anxiety;excessive stress panick attacks  Heme/Lymph/Imm:  Positive for allergies RX  Endocrine:  Positive for diabetes      Physical Exam:  Vitals: /60 (BP Location: Right arm, Patient Position: Sitting, Cuff Size: Adult Large)   Pulse 87   Ht 1.6 m (5' 3\")   Wt 109.8 kg (242 lb 1.6 oz)   LMP 04/15/2016 (Exact Date)   SpO2 96%   BMI 42.89 kg/m      General patient appears comfortable  Neck normal JVP  Cardiovascular system S1-S2 normal no murmur rub or gallop  Respiratory system clear to auscultation  Extremities no edema  Neurological alert, oriented      CC  Rashmi Del Rio PA-C  [address] [facility] [address]  [place] [address]                  "

## 2022-02-03 ENCOUNTER — MEDICAL CORRESPONDENCE (OUTPATIENT)
Dept: HEALTH INFORMATION MANAGEMENT | Facility: CLINIC | Age: 52
End: 2022-02-03
Payer: COMMERCIAL

## 2022-02-03 NOTE — TELEPHONE ENCOUNTER
Spoke with Margret the home health nurse  States patient is very hard to get ahold of.  She will relay the message to take daily MVI  And that Dr Low does not agree with a higher dose of Vitamin D.  To keep it at it's current dosing.  Patient to call GI if feels liver biopsy would be needed. But per Dr Low she has been evaluated many times.

## 2022-02-03 NOTE — TELEPHONE ENCOUNTER
Patient's home/cell number message on machine to call back.  See also, 1- lab questions encounter.

## 2022-02-08 ENCOUNTER — MEDICAL CORRESPONDENCE (OUTPATIENT)
Dept: HEALTH INFORMATION MANAGEMENT | Facility: CLINIC | Age: 52
End: 2022-02-08
Payer: COMMERCIAL

## 2022-02-09 ENCOUNTER — OFFICE VISIT (OUTPATIENT)
Dept: URGENT CARE | Facility: URGENT CARE | Age: 52
End: 2022-02-09
Payer: COMMERCIAL

## 2022-02-09 VITALS
RESPIRATION RATE: 18 BRPM | DIASTOLIC BLOOD PRESSURE: 73 MMHG | OXYGEN SATURATION: 95 % | BODY MASS INDEX: 42.87 KG/M2 | SYSTOLIC BLOOD PRESSURE: 130 MMHG | WEIGHT: 242 LBS | HEART RATE: 83 BPM

## 2022-02-09 DIAGNOSIS — L30.1 DYSHIDROTIC ECZEMA: ICD-10-CM

## 2022-02-09 DIAGNOSIS — R21 RASH AND NONSPECIFIC SKIN ERUPTION: Primary | ICD-10-CM

## 2022-02-09 PROCEDURE — 99214 OFFICE O/P EST MOD 30 MIN: CPT | Performed by: PHYSICIAN ASSISTANT

## 2022-02-09 NOTE — PATIENT INSTRUCTIONS
These do not appear to be typical presentation of bedbugs or scabies    Please treat with topical steroid and oral cetirizine     Follow up with dermatologist regarding persistent eczema    Patient Education     Managing Atopic Dermatitis (Eczema)     After bathing, gently pat your skin dry (don t rub). Apply moisturizer while your skin is still damp.   To manage your symptoms and help reduce the severity and frequency, try these self-care tips:   Caring for your skin    Use a gentle, fragrance-free cleanser (or soap substitute cleanser) for bathing. Rinse well. Pat skin dry.    Take warm, not hot, baths or showers. Try to limit them to no more that 10 to 15 minutes.     Use moisturizer liberally right after you bathe, while your skin is still damp.    Try not to scratch because it will cause more damage to your skin.     Topical, over-the-counter hydrocortisone cream may help control mild symptoms.     Controlling your environment    Stay out of extreme heat or cold.    Stay out of very humid or very dry air.    If your home or office air is very dry, use a humidifier.    Stay away from allergens such as dust that may be in bedding, carpets, plush toys, or rugs.    Know that pet hair and dander can cause flare-ups.    Seeking medical treatment  Another way to keep symptoms under control is to seek medical treatment. Talk with your healthcare provider about the type of treatment that may work best for you. Your provider may prescribe treatments such as:     Treatments to put on the skin daily    Medicines taken by mouth (oral medicines) such as antihistamines, antibiotics, or corticosteroids    In severe cases, you may need shots (injections) to control the symptoms. You may even need antibiotics if skin infections occur.  Treatments don t work the same way for every person. So if your symptoms continue or get worse, ask your healthcare provider about other treatments.   Making lifestyle choices    Manage the stress  in your life.    Wear loose-fitting cotton clothing that does not bind or rub your skin.    Don't wear wool or other scratchy fabrics.    Use fragrance-free products.    Next step  Now that you know more about atopic dermatitis, the next step is up to you. Follow your healthcare provider s treatment plan and your self-care routine. This will help bring atopic dermatitis under control. If your symptoms persist, be sure to let your health care provider know.   [employer] last reviewed this educational content on 8/1/2019 2000-2021 [employer]. All rights reserved. This information is not intended as a substitute for professional medical care. Always follow your healthcare professional's instructions.           Patient Education     Bedbugs  After years of being very rare in the [place] bedbugs are making a comeback. These bugs are small, about the size of an apple seed. They are reddish-brown, oval, and look slightly flattened. Bedbugs feed on human and animal blood, usually at night during sleep. Bedbugs are a nuisance. But they are not a major threat to your health.   Facts about bedbugs    Bedbugs are active mainly at night. During the day, they hide in dark places, often in and around where people or animals sleep. They are commonly found on mattresses and boxsprings and behind headboards. But they can hide anywhere.    Bedbugs are small and hard to see. They are often carried from place to place in items like luggage, furniture, and clothing. This is why they spread so easily.    Bedbugs are not attracted to dirt. Even the cleanest house or hotel can have bedbugs.    Unlike mosquitoes, bedbugs don't spread disease. If you are bitten, you don't have to worry about catching a blood-borne illness.    Insect repellents have little effect on bedbugs.    Adult bedbugs can live for several months without a blood feeding.    Bedbugs are very hard to get rid of. If an infestation is suspected, it is  recommended that a professional  be called.    Signs of bedbugs  Bites can be the first sign of a bedbug infestation. When inspecting for the bugs, look in crevices of mattresses and box springs, behind the headboard, and in and on objects near or under the bed. You may see the bugs themselves. Or, you may see tiny dark stains on fabric or carpets. Smears of blood on sheets and nightclothes upon awakening are another sign. In some cases, the bugs are so well hidden they can t be found unless items are taken apart.   Bedbug bites    Bedbugs look for food at night. They bite while people or animals are sleeping. The bites are most often painless. Many people never know they ve been bitten. But some people develop an itchy red welt or swelling. And if a person has an allergic reaction, severe itching, blisters, or hives can develop. Bites are often on areas that are exposed, such as the head, neck, arms, and hands. Bedbug bites are not dangerous and don t spread illness. But if the bite is scratched and the skin is broken and irritated, there is a chance that a skin infection can develop.   Treating bites  Bite symptoms usually go away on their own within a week or two. During this time, over-the-counter (OTC) hydrocortisone ointment or cream can help relieve itching and swelling. If itching is bad, an OTC antihistamine that s taken by mouth (oral) can help. If an infection develops from scratching the bites, your healthcare provider can prescribe an antibiotic.   If you were bitten by bedbugs in your home, talk to a licensed pest-control professional or company. They can inspect your home and help you get rid of the bugs safely.   When to call your healthcare provider  If you have bites, call your healthcare provider or seek medical attention right away if you develop any of the following:     A fever of 100.4 F ( 38 C ) or higher, or as directed by your provider    Signs of infection of the bites, such as  increased swelling and pain, warmth, or oozing    Signs of allergic reaction, such as hives, spreading rash, throat itching or swelling, or wheezing  Preventing bedbugs    Don't buy used beds. If you do buy used bed frames, mattresses, box springs, or other furniture, check them carefully for bedbugs before bringing them into your home.    If bedbugs are found or suspected in the bed, use mattress and box spring encasement covers that can seal in bedbugs so they will die there.    When traveling, remove linens from the top of the bed and check the mattress and headboard for signs of the bugs. Place luggage on a hard surface such as a table or on a luggage rack and not on the floor.    If you think you were exposed to bedbugs while traveling, wash all clothing in hot water as soon as you get home. Washing alone will not kill the bugs. Clothing must be put in a dryer at high temperatures, at least 113 F (45 C) for 1 hour.     Never  items discarded on the street for use in your home. These include bed frames, mattresses, box springs, or upholstered furniture. These items may carry bedbugs.    [employer] last reviewed this educational content on 8/1/2019 2000-2021 [employer]. All rights reserved. This information is not intended as a substitute for professional medical care. Always follow your healthcare professional's instructions.           Patient Education     Bedbugs  After years of being very rare in the [place] bedbugs are making a comeback. These bugs are small, about the size of an apple seed. They are reddish-brown, oval, and look slightly flattened. Bedbugs feed on human and animal blood, usually at night during sleep. Bedbugs are a nuisance. But they are not a major threat to your health.   Facts about bedbugs    Bedbugs are active mainly at night. During the day, they hide in dark places, often in and around where people or animals sleep. They are commonly found on mattresses and  boxsprings and behind headboards. But they can hide anywhere.    Bedbugs are small and hard to see. They are often carried from place to place in items like luggage, furniture, and clothing. This is why they spread so easily.    Bedbugs are not attracted to dirt. Even the cleanest house or hotel can have bedbugs.    Unlike mosquitoes, bedbugs don't spread disease. If you are bitten, you don't have to worry about catching a blood-borne illness.    Insect repellents have little effect on bedbugs.    Adult bedbugs can live for several months without a blood feeding.    Bedbugs are very hard to get rid of. If an infestation is suspected, it is recommended that a professional  be called.    Signs of bedbugs  Bites can be the first sign of a bedbug infestation. When inspecting for the bugs, look in crevices of mattresses and box springs, behind the headboard, and in and on objects near or under the bed. You may see the bugs themselves. Or, you may see tiny dark stains on fabric or carpets. Smears of blood on sheets and nightclothes upon awakening are another sign. In some cases, the bugs are so well hidden they can t be found unless items are taken apart.   Bedbug bites    Bedbugs look for food at night. They bite while people or animals are sleeping. The bites are most often painless. Many people never know they ve been bitten. But some people develop an itchy red welt or swelling. And if a person has an allergic reaction, severe itching, blisters, or hives can develop. Bites are often on areas that are exposed, such as the head, neck, arms, and hands. Bedbug bites are not dangerous and don t spread illness. But if the bite is scratched and the skin is broken and irritated, there is a chance that a skin infection can develop.   Treating bites  Bite symptoms usually go away on their own within a week or two. During this time, over-the-counter (OTC) hydrocortisone ointment or cream can help relieve itching and  swelling. If itching is bad, an OTC antihistamine that s taken by mouth (oral) can help. If an infection develops from scratching the bites, your healthcare provider can prescribe an antibiotic.   If you were bitten by bedbugs in your home, talk to a licensed pest-control professional or company. They can inspect your home and help you get rid of the bugs safely.   When to call your healthcare provider  If you have bites, call your healthcare provider or seek medical attention right away if you develop any of the following:     A fever of 100.4 F ( 38 C ) or higher, or as directed by your provider    Signs of infection of the bites, such as increased swelling and pain, warmth, or oozing    Signs of allergic reaction, such as hives, spreading rash, throat itching or swelling, or wheezing  Preventing bedbugs    Don't buy used beds. If you do buy used bed frames, mattresses, box springs, or other furniture, check them carefully for bedbugs before bringing them into your home.    If bedbugs are found or suspected in the bed, use mattress and box spring encasement covers that can seal in bedbugs so they will die there.    When traveling, remove linens from the top of the bed and check the mattress and headboard for signs of the bugs. Place luggage on a hard surface such as a table or on a luggage rack and not on the floor.    If you think you were exposed to bedbugs while traveling, wash all clothing in hot water as soon as you get home. Washing alone will not kill the bugs. Clothing must be put in a dryer at high temperatures, at least 113 F (45 C) for 1 hour.     Never  items discarded on the street for use in your home. These include bed frames, mattresses, box springs, or upholstered furniture. These items may carry bedbugs.    [employer] last reviewed this educational content on 8/1/2019 2000-2021 [employer]. All rights reserved. This information is not intended as a substitute for  professional medical care. Always follow your healthcare professional's instructions.           Patient Education     Bedbugs  After years of being very rare in the [place] bedbugs are making a comeback. These bugs are small, about the size of an apple seed. They are reddish-brown, oval, and look slightly flattened. Bedbugs feed on human and animal blood, usually at night during sleep. Bedbugs are a nuisance. But they are not a major threat to your health.   Facts about bedbugs    Bedbugs are active mainly at night. During the day, they hide in dark places, often in and around where people or animals sleep. They are commonly found on mattresses and boxsprings and behind headboards. But they can hide anywhere.    Bedbugs are small and hard to see. They are often carried from place to place in items like luggage, furniture, and clothing. This is why they spread so easily.    Bedbugs are not attracted to dirt. Even the cleanest house or hotel can have bedbugs.    Unlike mosquitoes, bedbugs don't spread disease. If you are bitten, you don't have to worry about catching a blood-borne illness.    Insect repellents have little effect on bedbugs.    Adult bedbugs can live for several months without a blood feeding.    Bedbugs are very hard to get rid of. If an infestation is suspected, it is recommended that a professional  be called.    Signs of bedbugs  Bites can be the first sign of a bedbug infestation. When inspecting for the bugs, look in crevices of mattresses and box springs, behind the headboard, and in and on objects near or under the bed. You may see the bugs themselves. Or, you may see tiny dark stains on fabric or carpets. Smears of blood on sheets and nightclothes upon awakening are another sign. In some cases, the bugs are so well hidden they can t be found unless items are taken apart.   Bedbug bites    Bedbugs look for food at night. They bite while people or animals are sleeping. The bites are  most often painless. Many people never know they ve been bitten. But some people develop an itchy red welt or swelling. And if a person has an allergic reaction, severe itching, blisters, or hives can develop. Bites are often on areas that are exposed, such as the head, neck, arms, and hands. Bedbug bites are not dangerous and don t spread illness. But if the bite is scratched and the skin is broken and irritated, there is a chance that a skin infection can develop.   Treating bites  Bite symptoms usually go away on their own within a week or two. During this time, over-the-counter (OTC) hydrocortisone ointment or cream can help relieve itching and swelling. If itching is bad, an OTC antihistamine that s taken by mouth (oral) can help. If an infection develops from scratching the bites, your healthcare provider can prescribe an antibiotic.   If you were bitten by bedbugs in your home, talk to a licensed pest-control professional or company. They can inspect your home and help you get rid of the bugs safely.   When to call your healthcare provider  If you have bites, call your healthcare provider or seek medical attention right away if you develop any of the following:     A fever of 100.4 F ( 38 C ) or higher, or as directed by your provider    Signs of infection of the bites, such as increased swelling and pain, warmth, or oozing    Signs of allergic reaction, such as hives, spreading rash, throat itching or swelling, or wheezing  Preventing bedbugs    Don't buy used beds. If you do buy used bed frames, mattresses, box springs, or other furniture, check them carefully for bedbugs before bringing them into your home.    If bedbugs are found or suspected in the bed, use mattress and box spring encasement covers that can seal in bedbugs so they will die there.    When traveling, remove linens from the top of the bed and check the mattress and headboard for signs of the bugs. Place luggage on a hard surface such as a  table or on a luggage rack and not on the floor.    If you think you were exposed to bedbugs while traveling, wash all clothing in hot water as soon as you get home. Washing alone will not kill the bugs. Clothing must be put in a dryer at high temperatures, at least 113 F (45 C) for 1 hour.     Never  items discarded on the street for use in your home. These include bed frames, mattresses, box springs, or upholstered furniture. These items may carry bedbugs.    [employer] last reviewed this educational content on 8/1/2019 2000-2021 [employer]. All rights reserved. This information is not intended as a substitute for professional medical care. Always follow your healthcare professional's instructions.

## 2022-02-10 ENCOUNTER — TELEPHONE (OUTPATIENT)
Dept: INTERNAL MEDICINE | Facility: CLINIC | Age: 52
End: 2022-02-10
Payer: COMMERCIAL

## 2022-02-10 DIAGNOSIS — L30.9 ECZEMA, UNSPECIFIED TYPE: ICD-10-CM

## 2022-02-10 NOTE — PROGRESS NOTES
"SUBJECTIVE:  Swetha Patterson is a 51 year old female who presents to the clinic today for a rash.  Onset of rash was 1 day(s) ago.   Rash is gradual onset.  Location of the rash: arm, lower and arm, upper.  Quality/symptoms of rash: itching   Symptoms are mild and rash seems to be stable.  Previous history of a similar rash? No  Recent exposure history: none known, visited a friends house 4 days ago. They are asymptomatic    Associated symptoms include: nothing.    Past Medical History:   Diagnosis Date     Anxiety state, unspecified      Asthma      Chronic pain     back pain from cyst     Contact dermatitis and other eczema, due to unspecified cause      COPD (chronic obstructive pulmonary disease) (H)      Depressive disorder, not elsewhere classified      Diabetes (H)      Emphysema with chronic bronchitis (H)      Esophageal reflux      Family history of ischemic heart disease      Fibromyalgia      Gastro-oesophageal reflux disease      Heart disease     has chest pain sometimes     History of emphysema      Hoarseness      HTN, goal below 140/90      Hyperlipidemia LDL goal <130      Liver disease     \"fatty liver\"     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)      PONV (postoperative nausea and vomiting)      Rectal bleeding      Current Outpatient Medications   Medication Sig Dispense Refill     albuterol (PROVENTIL) (2.5 MG/3ML) 0.083% neb solution Take 1 vial (2.5 mg) by nebulization every 6 hours as needed for shortness of breath / dyspnea or wheezing 25 vial 3     albuterol (VENTOLIN HFA) 108 (90 Base) MCG/ACT inhaler Inhale 2 puffs into the lungs every 6 hours 3 each 3     ALREX 0.2 % SUSP ophthalmic susp INSTILL ONE DROP INTO THE AFFECTED EYES FOUR TIMES A DAY       amLODIPine (NORVASC) 2.5 MG tablet Take 2 tablets (5 mg) by mouth daily 30 tablet 3     atorvastatin (LIPITOR) 80 MG tablet TAKE ONE TABLET BY MOUTH EVERY DAY 90 tablet 0     benzonatate (TESSALON) 200 MG capsule Take 1 capsule (200 mg) " by mouth 3 times daily as needed for cough 60 capsule 1     benzoyl peroxide (ACNE-CLEAR) 10 % external gel Apply topically 2 times daily as needed       blood glucose (ACCU-CHEK ALLYSON PLUS) test strip USE TO TEST BLOOD SUGAR ONCE DAILY OR AS DIRECTED 100 strip 3     blood glucose (NO BRAND SPECIFIED) lancets standard Use to test blood sugar 1 times daily or as directed. 100 each 3     blood glucose (NO BRAND SPECIFIED) lancets standard Use to test blood sugar 1 time daily 100 each 1     blood glucose (NO BRAND SPECIFIED) test strip Use to test blood sugar 1 times daily or as directed.  Dispense Accu-chek Allyson Plus or per patient insurance 100 strip 3     blood glucose monitoring (NO BRAND SPECIFIED) meter device kit Use to test blood sugar 1 times daily or as directed.  Dispense Accu-chek Allyson Plus or per insurance preference 1 kit 0     blood glucose monitoring (NO BRAND SPECIFIED) meter device kit Use to test blood sugar 1 times daily or as directed. 1 kit 0     budesonide-formoterol (SYMBICORT) 160-4.5 MCG/ACT Inhaler Inhale 2 puffs into the lungs 2 times daily 30.6 g 3     buPROPion (WELLBUTRIN XL) 150 MG 24 hr tablet TAKE ONE TABLET BY MOUTH EVERY MORNING 90 tablet 2     cetirizine (ZYRTEC) 10 MG tablet TAKE ONE TABLET BY MOUTH EVERY DAY AS NEEDED 90 tablet 3     cetirizine HCl 10 MG CAPS Take 1 capsule (10 mg) by mouth daily as needed Takes in the spring. 90 capsule 3     clindamycin 1 % EX external gel Apply topically 2 times daily 60 g 3     clonazePAM (KLONOPIN) 0.5 MG tablet TAKE ONE TABLET BY MOUTH EVERY DAY AT NOON       clonazePAM (KLONOPIN) 1 MG tablet Take 1 tablet (1 mg) by mouth 2 times daily as needed for anxiety She needs to see her PCP to get more tablets 5 tablet 0     DULoxetine (CYMBALTA) 20 MG capsule Take 1 capsule (20 mg) by mouth daily X 7 days and if well tolerated increase to 40 mg daily. 53 capsule 1     fluticasone (FLONASE) 50 MCG/ACT nasal spray Spray 2 sprays in nostril daily 16  g 5     Fluticasone-Umeclidin-Vilanterol (TRELEGY ELLIPTA) 100-62.5-25 MCG/INH oral inhaler Inhale 1 puff into the lungs daily 28 each 11     furosemide (LASIX) 20 MG tablet Take 1 tablet (20 mg) by mouth 2 times daily 180 tablet 0     glipiZIDE (GLUCOTROL XL) 2.5 MG 24 hr tablet TAKE ONE TABLET BY MOUTH EVERY DAY 90 tablet 1     hydrocortisone 2.5 % cream APPLY TOPICALLY TO THE AFFECTED AREA(S) TWO TIMES A DAY 30 g 1     hydrOXYzine (ATARAX) 50 MG tablet TAKE TWO TABLETS BY MOUTH THREE TIMES A DAY AS NEEDED FOR ANXIETY 70 tablet 1     lamoTRIgine (LAMICTAL) 150 MG tablet Take 150 mg by mouth daily       lisinopril (ZESTRIL) 20 MG tablet TAKE ONE TABLET BY MOUTH EVERY DAY 90 tablet 0     metFORMIN (GLUCOPHAGE) 500 MG tablet TAKE ONE TABLET BY MOUTH EVERY DAY WITH BREAKFAST 90 tablet 1     montelukast (SINGULAIR) 10 MG tablet TAKE ONE TABLET BY MOUTH AT BEDTIME 90 tablet 0     nicotine (NICODERM CQ) 14 MG/24HR 24 hr patch Place 1 patch onto the skin every 24 hours 28 patch 1     nicotine (NICODERM CQ) 21 MG/24HR 24 hr patch Place 1 patch onto the skin every 24 hours 28 patch 1     nicotine (NICORETTE) 2 MG gum Place 1 each (2 mg) inside cheek every hour as needed for smoking cessation 240 each 1     nitroGLYcerin (NITROSTAT) 0.4 MG sublingual tablet PLACE ONE TABLET UNDER THE TONGUE AT THE 1ST SIGN OF ATTACK. IF PAIN IS UNRELIEVED OR WORSENED 5 MINUTES AFTER 1ST DOSE, PROMPT MEDICAL ASSISTANCE IS NEEDED. MAY REPEAT EVERY 5 MINUTES UNTIL PAIN IS RELIEVED. MAX OF THREE DOSES 25 tablet 11     nystatin (MYCOSTATIN) 607890 UNIT/GM external powder APPLY TOPICALLY TWICE A DAY AS NEEDED SKIN RASH 45 g 3     nystatin (MYCOSTATIN) 369092 UNIT/ML suspension TAKE 5 ML BY MOUTH FOUR TIMES A  mL 0     omeprazole (PRILOSEC) 20 MG DR capsule TAKE ONE CAPSULE BY MOUTH TWO TIMES A  capsule 1     omeprazole (PRILOSEC) 40 MG DR capsule TAKE ONE CAPSULE BY MOUTH TWICE A  capsule 0     ondansetron (ZOFRAN) 8 MG tablet  "TAKE ONE-HALF TO ONE TABLET BY MOUTH EVERY 8 HOURS AS NEEDED FOR NAUSEA 30 tablet 3     ondansetron (ZOFRAN-ODT) 4 MG ODT tab Take 1 tablet (4 mg) by mouth every 6 hours as needed for nausea 12 tablet 1     polyethylene glycol (MIRALAX) 17 GM/Dose powder Take 17 g (1 capful) by mouth daily 507 g 1     prochlorperazine (COMPAZINE) 10 MG tablet Take 1 tablet (10 mg) by mouth every 8 hours as needed for nausea or vomiting 30 tablet 0     SM MUCUS RELIEF 600 MG 12 hr tablet TAKE TWO TABLET BY MOUTH TWO TIMES DAILY 30 tablet 0     sulfamethoxazole-trimethoprim (BACTRIM DS) 800-160 MG tablet Take 1 tablet by mouth 2 times daily Take one tablet twice daily. For additional refills, please schedule a follow-up appointment. 180 tablet 1     terbinafine (LAMISIL) 1 % external cream APPLY TO AFFECTED AREA(S) TWO TIMES A DAY 30 g 2     VENTOLIN  (90 Base) MCG/ACT inhaler INHALE TWO PUFFS BY MOUTH EVERY 6 HOURS AS NEEDED FOR SHORTNESS OF BREATH 18 g 1     Social History     Tobacco Use     Smoking status: Former Smoker     Packs/day: 0.50     Years: 30.00     Pack years: 15.00     Types: Cigarettes     Start date: 1/1/1985     Smokeless tobacco: Former User     Quit date: 1/6/2022     Tobacco comment: smokes 1 cigarettes a day - in the process of trying to quit   Substance Use Topics     Alcohol use: Not Currently     Comment: \"once in a blue moon\", none for five months       ROS:  Review of systems negative except as stated above.    EXAM:   /73   Pulse 83   Resp 18   Wt 109.8 kg (242 lb)   LMP 04/15/2016 (Exact Date)   SpO2 95%   BMI 42.87 kg/m    GENERAL: alert, no acute distress.  SKIN: solitary macules x 5, baseline hyperkeratotic bilateral palmar rash    GENERAL APPEARANCE: healthy, alert and no distress  EYES: EOMI,  PERRL, conjunctiva clear  NECK: supple, non-tender to palpation, no adenopathy noted  RESP: lungs clear to auscultation - no rales, rhonchi or wheezes  CV: regular rates and rhythm, normal " S1 S2, no murmur noted    ASSESSMENT:  (R21) Rash and nonspecific skin eruption  (primary encounter diagnosis)  Plan: Adult Dermatology Referral      (L30.1) Dyshidrotic eczema  Plan: Adult Dermatology Referral         Patient Instructions     These do not appear to be typical presentation of bedbugs or scabies    Please treat with topical steroid and oral cetirizine     Follow up with dermatologist regarding persistent eczema    Patient Education     Managing Atopic Dermatitis (Eczema)     After bathing, gently pat your skin dry (don t rub). Apply moisturizer while your skin is still damp.   To manage your symptoms and help reduce the severity and frequency, try these self-care tips:   Caring for your skin    Use a gentle, fragrance-free cleanser (or soap substitute cleanser) for bathing. Rinse well. Pat skin dry.    Take warm, not hot, baths or showers. Try to limit them to no more that 10 to 15 minutes.     Use moisturizer liberally right after you bathe, while your skin is still damp.    Try not to scratch because it will cause more damage to your skin.     Topical, over-the-counter hydrocortisone cream may help control mild symptoms.     Controlling your environment    Stay out of extreme heat or cold.    Stay out of very humid or very dry air.    If your home or office air is very dry, use a humidifier.    Stay away from allergens such as dust that may be in bedding, carpets, plush toys, or rugs.    Know that pet hair and dander can cause flare-ups.    Seeking medical treatment  Another way to keep symptoms under control is to seek medical treatment. Talk with your healthcare provider about the type of treatment that may work best for you. Your provider may prescribe treatments such as:     Treatments to put on the skin daily    Medicines taken by mouth (oral medicines) such as antihistamines, antibiotics, or corticosteroids    In severe cases, you may need shots (injections) to control the symptoms. You may  even need antibiotics if skin infections occur.  Treatments don t work the same way for every person. So if your symptoms continue or get worse, ask your healthcare provider about other treatments.   Making lifestyle choices    Manage the stress in your life.    Wear loose-fitting cotton clothing that does not bind or rub your skin.    Don't wear wool or other scratchy fabrics.    Use fragrance-free products.    Next step  Now that you know more about atopic dermatitis, the next step is up to you. Follow your healthcare provider s treatment plan and your self-care routine. This will help bring atopic dermatitis under control. If your symptoms persist, be sure to let your health care provider know.   [employer] last reviewed this educational content on 8/1/2019 2000-2021 [employer]. All rights reserved. This information is not intended as a substitute for professional medical care. Always follow your healthcare professional's instructions.           Patient Education     Bedbugs  After years of being very rare in the [place] bedbugs are making a comeback. These bugs are small, about the size of an apple seed. They are reddish-brown, oval, and look slightly flattened. Bedbugs feed on human and animal blood, usually at night during sleep. Bedbugs are a nuisance. But they are not a major threat to your health.   Facts about bedbugs    Bedbugs are active mainly at night. During the day, they hide in dark places, often in and around where people or animals sleep. They are commonly found on mattresses and boxsprings and behind headboards. But they can hide anywhere.    Bedbugs are small and hard to see. They are often carried from place to place in items like luggage, furniture, and clothing. This is why they spread so easily.    Bedbugs are not attracted to dirt. Even the cleanest house or hotel can have bedbugs.    Unlike mosquitoes, bedbugs don't spread disease. If you are bitten, you don't have to worry  about catching a blood-borne illness.    Insect repellents have little effect on bedbugs.    Adult bedbugs can live for several months without a blood feeding.    Bedbugs are very hard to get rid of. If an infestation is suspected, it is recommended that a professional  be called.    Signs of bedbugs  Bites can be the first sign of a bedbug infestation. When inspecting for the bugs, look in crevices of mattresses and box springs, behind the headboard, and in and on objects near or under the bed. You may see the bugs themselves. Or, you may see tiny dark stains on fabric or carpets. Smears of blood on sheets and nightclothes upon awakening are another sign. In some cases, the bugs are so well hidden they can t be found unless items are taken apart.   Bedbug bites    Bedbugs look for food at night. They bite while people or animals are sleeping. The bites are most often painless. Many people never know they ve been bitten. But some people develop an itchy red welt or swelling. And if a person has an allergic reaction, severe itching, blisters, or hives can develop. Bites are often on areas that are exposed, such as the head, neck, arms, and hands. Bedbug bites are not dangerous and don t spread illness. But if the bite is scratched and the skin is broken and irritated, there is a chance that a skin infection can develop.   Treating bites  Bite symptoms usually go away on their own within a week or two. During this time, over-the-counter (OTC) hydrocortisone ointment or cream can help relieve itching and swelling. If itching is bad, an OTC antihistamine that s taken by mouth (oral) can help. If an infection develops from scratching the bites, your healthcare provider can prescribe an antibiotic.   If you were bitten by bedbugs in your home, talk to a licensed pest-control professional or company. They can inspect your home and help you get rid of the bugs safely.   When to call your healthcare provider  If  you have bites, call your healthcare provider or seek medical attention right away if you develop any of the following:     A fever of 100.4 F ( 38 C ) or higher, or as directed by your provider    Signs of infection of the bites, such as increased swelling and pain, warmth, or oozing    Signs of allergic reaction, such as hives, spreading rash, throat itching or swelling, or wheezing  Preventing bedbugs    Don't buy used beds. If you do buy used bed frames, mattresses, box springs, or other furniture, check them carefully for bedbugs before bringing them into your home.    If bedbugs are found or suspected in the bed, use mattress and box spring encasement covers that can seal in bedbugs so they will die there.    When traveling, remove linens from the top of the bed and check the mattress and headboard for signs of the bugs. Place luggage on a hard surface such as a table or on a luggage rack and not on the floor.    If you think you were exposed to bedbugs while traveling, wash all clothing in hot water as soon as you get home. Washing alone will not kill the bugs. Clothing must be put in a dryer at high temperatures, at least 113 F (45 C) for 1 hour.     Never  items discarded on the street for use in your home. These include bed frames, mattresses, box springs, or upholstered furniture. These items may carry bedbugs.    [employer] last reviewed this educational content on 8/1/2019 2000-2021 [employer]. All rights reserved. This information is not intended as a substitute for professional medical care. Always follow your healthcare professional's instructions.           Patient Education     Bedbugs  After years of being very rare in the [place] bedbugs are making a comeback. These bugs are small, about the size of an apple seed. They are reddish-brown, oval, and look slightly flattened. Bedbugs feed on human and animal blood, usually at night during sleep. Bedbugs are a nuisance. But they  are not a major threat to your health.   Facts about bedbugs    Bedbugs are active mainly at night. During the day, they hide in dark places, often in and around where people or animals sleep. They are commonly found on mattresses and boxsprings and behind headboards. But they can hide anywhere.    Bedbugs are small and hard to see. They are often carried from place to place in items like luggage, furniture, and clothing. This is why they spread so easily.    Bedbugs are not attracted to dirt. Even the cleanest house or hotel can have bedbugs.    Unlike mosquitoes, bedbugs don't spread disease. If you are bitten, you don't have to worry about catching a blood-borne illness.    Insect repellents have little effect on bedbugs.    Adult bedbugs can live for several months without a blood feeding.    Bedbugs are very hard to get rid of. If an infestation is suspected, it is recommended that a professional  be called.    Signs of bedbugs  Bites can be the first sign of a bedbug infestation. When inspecting for the bugs, look in crevices of mattresses and box springs, behind the headboard, and in and on objects near or under the bed. You may see the bugs themselves. Or, you may see tiny dark stains on fabric or carpets. Smears of blood on sheets and nightclothes upon awakening are another sign. In some cases, the bugs are so well hidden they can t be found unless items are taken apart.   Bedbug bites    Bedbugs look for food at night. They bite while people or animals are sleeping. The bites are most often painless. Many people never know they ve been bitten. But some people develop an itchy red welt or swelling. And if a person has an allergic reaction, severe itching, blisters, or hives can develop. Bites are often on areas that are exposed, such as the head, neck, arms, and hands. Bedbug bites are not dangerous and don t spread illness. But if the bite is scratched and the skin is broken and irritated,  there is a chance that a skin infection can develop.   Treating bites  Bite symptoms usually go away on their own within a week or two. During this time, over-the-counter (OTC) hydrocortisone ointment or cream can help relieve itching and swelling. If itching is bad, an OTC antihistamine that s taken by mouth (oral) can help. If an infection develops from scratching the bites, your healthcare provider can prescribe an antibiotic.   If you were bitten by bedbugs in your home, talk to a licensed pest-control professional or company. They can inspect your home and help you get rid of the bugs safely.   When to call your healthcare provider  If you have bites, call your healthcare provider or seek medical attention right away if you develop any of the following:     A fever of 100.4 F ( 38 C ) or higher, or as directed by your provider    Signs of infection of the bites, such as increased swelling and pain, warmth, or oozing    Signs of allergic reaction, such as hives, spreading rash, throat itching or swelling, or wheezing  Preventing bedbugs    Don't buy used beds. If you do buy used bed frames, mattresses, box springs, or other furniture, check them carefully for bedbugs before bringing them into your home.    If bedbugs are found or suspected in the bed, use mattress and box spring encasement covers that can seal in bedbugs so they will die there.    When traveling, remove linens from the top of the bed and check the mattress and headboard for signs of the bugs. Place luggage on a hard surface such as a table or on a luggage rack and not on the floor.    If you think you were exposed to bedbugs while traveling, wash all clothing in hot water as soon as you get home. Washing alone will not kill the bugs. Clothing must be put in a dryer at high temperatures, at least 113 F (45 C) for 1 hour.     Never  items discarded on the street for use in your home. These include bed frames, mattresses, box springs, or  upholstered furniture. These items may carry bedbugs.    [employer] last reviewed this educational content on 8/1/2019 2000-2021 [employer]. All rights reserved. This information is not intended as a substitute for professional medical care. Always follow your healthcare professional's instructions.           Patient Education     Bedbugs  After years of being very rare in the [place] bedbugs are making a comeback. These bugs are small, about the size of an apple seed. They are reddish-brown, oval, and look slightly flattened. Bedbugs feed on human and animal blood, usually at night during sleep. Bedbugs are a nuisance. But they are not a major threat to your health.   Facts about bedbugs    Bedbugs are active mainly at night. During the day, they hide in dark places, often in and around where people or animals sleep. They are commonly found on mattresses and boxsprings and behind headboards. But they can hide anywhere.    Bedbugs are small and hard to see. They are often carried from place to place in items like luggage, furniture, and clothing. This is why they spread so easily.    Bedbugs are not attracted to dirt. Even the cleanest house or hotel can have bedbugs.    Unlike mosquitoes, bedbugs don't spread disease. If you are bitten, you don't have to worry about catching a blood-borne illness.    Insect repellents have little effect on bedbugs.    Adult bedbugs can live for several months without a blood feeding.    Bedbugs are very hard to get rid of. If an infestation is suspected, it is recommended that a professional  be called.    Signs of bedbugs  Bites can be the first sign of a bedbug infestation. When inspecting for the bugs, look in crevices of mattresses and box springs, behind the headboard, and in and on objects near or under the bed. You may see the bugs themselves. Or, you may see tiny dark stains on fabric or carpets. Smears of blood on sheets and nightclothes upon  awakening are another sign. In some cases, the bugs are so well hidden they can t be found unless items are taken apart.   Bedbug bites    Bedbugs look for food at night. They bite while people or animals are sleeping. The bites are most often painless. Many people never know they ve been bitten. But some people develop an itchy red welt or swelling. And if a person has an allergic reaction, severe itching, blisters, or hives can develop. Bites are often on areas that are exposed, such as the head, neck, arms, and hands. Bedbug bites are not dangerous and don t spread illness. But if the bite is scratched and the skin is broken and irritated, there is a chance that a skin infection can develop.   Treating bites  Bite symptoms usually go away on their own within a week or two. During this time, over-the-counter (OTC) hydrocortisone ointment or cream can help relieve itching and swelling. If itching is bad, an OTC antihistamine that s taken by mouth (oral) can help. If an infection develops from scratching the bites, your healthcare provider can prescribe an antibiotic.   If you were bitten by bedbugs in your home, talk to a licensed pest-control professional or company. They can inspect your home and help you get rid of the bugs safely.   When to call your healthcare provider  If you have bites, call your healthcare provider or seek medical attention right away if you develop any of the following:     A fever of 100.4 F ( 38 C ) or higher, or as directed by your provider    Signs of infection of the bites, such as increased swelling and pain, warmth, or oozing    Signs of allergic reaction, such as hives, spreading rash, throat itching or swelling, or wheezing  Preventing bedbugs    Don't buy used beds. If you do buy used bed frames, mattresses, box springs, or other furniture, check them carefully for bedbugs before bringing them into your home.    If bedbugs are found or suspected in the bed, use mattress and box  spring encasement covers that can seal in bedbugs so they will die there.    When traveling, remove linens from the top of the bed and check the mattress and headboard for signs of the bugs. Place luggage on a hard surface such as a table or on a luggage rack and not on the floor.    If you think you were exposed to bedbugs while traveling, wash all clothing in hot water as soon as you get home. Washing alone will not kill the bugs. Clothing must be put in a dryer at high temperatures, at least 113 F (45 C) for 1 hour.     Never  items discarded on the street for use in your home. These include bed frames, mattresses, box springs, or upholstered furniture. These items may carry bedbugs.    Jorge A last reviewed this educational content on 8/1/2019 2000-2021 [employer]. All rights reserved. This information is not intended as a substitute for professional medical care. Always follow your healthcare professional's instructions.

## 2022-02-11 RX ORDER — HYDROCORTISONE 2.5 %
CREAM (GRAM) TOPICAL
Qty: 28.35 G | Refills: 1 | Status: SHIPPED | OUTPATIENT
Start: 2022-02-11 | End: 2022-03-11

## 2022-02-12 ENCOUNTER — TELEPHONE (OUTPATIENT)
Dept: FAMILY MEDICINE | Facility: CLINIC | Age: 52
End: 2022-02-12
Payer: COMMERCIAL

## 2022-02-12 NOTE — TELEPHONE ENCOUNTER
[facility] Call Center    Phone Message    May a detailed message be left on voicemail: yes     Reason for Call: Appointment Intake      Referring Provider Name: Srini Wong PA-C in  URGENT CARE    Diagnosis and/or Symptoms: Rash and nonspecific skin eruption; Dyshidrotic eczema    Return Pt to Dermatology - New issue - has new Urgent Referral Order that says Priority: 1-2 Weeks    Pt is scheduled on 05/04/22 and wait listed for OX and wants OX location - please review chart and Referral (there are actually two of them) and Dermatology Records and call Pt if you can move up her Appt please    Sending TE per protocol when Urgent Priority Referral Order comes in - Thank you        Action Taken: Message routed to:  [facility] OX DERM    Travel Screening: Not Applicable

## 2022-02-14 DIAGNOSIS — Z53.9 DIAGNOSIS NOT YET DEFINED: Primary | ICD-10-CM

## 2022-02-14 PROCEDURE — G0179 MD RECERTIFICATION HHA PT: HCPCS | Performed by: PHYSICIAN ASSISTANT

## 2022-02-14 NOTE — TELEPHONE ENCOUNTER
Last Read in [employer]   2/15/2022 10:01 AM by Swetha Patterson  [employer] message sent:    Ramone Posada-    I have been trying to get in touch with you in regards to getting you in for an earlier Dermatology appointment.    The number we have on file does not have a ring tone.    Please give us a call back at [phone].    Thank you,    Chase RN-BSN-PHN  [facility]  [phone]

## 2022-02-16 ENCOUNTER — VIRTUAL VISIT (OUTPATIENT)
Dept: PHARMACY | Facility: CLINIC | Age: 52
End: 2022-02-16
Attending: INTERNAL MEDICINE
Payer: COMMERCIAL

## 2022-02-16 DIAGNOSIS — E78.5 HYPERLIPIDEMIA LDL GOAL <130: ICD-10-CM

## 2022-02-16 DIAGNOSIS — K59.00 CONSTIPATION, UNSPECIFIED CONSTIPATION TYPE: ICD-10-CM

## 2022-02-16 DIAGNOSIS — J30.2 SEASONAL ALLERGIES: ICD-10-CM

## 2022-02-16 DIAGNOSIS — K21.9 GASTROESOPHAGEAL REFLUX DISEASE WITHOUT ESOPHAGITIS: ICD-10-CM

## 2022-02-16 DIAGNOSIS — J44.9 COPD, MODERATE (H): ICD-10-CM

## 2022-02-16 DIAGNOSIS — L30.9 ECZEMA, UNSPECIFIED TYPE: ICD-10-CM

## 2022-02-16 DIAGNOSIS — E11.9 TYPE 2 DIABETES MELLITUS WITHOUT COMPLICATION, WITHOUT LONG-TERM CURRENT USE OF INSULIN (H): ICD-10-CM

## 2022-02-16 DIAGNOSIS — F41.9 ANXIETY: ICD-10-CM

## 2022-02-16 DIAGNOSIS — I10 HTN, GOAL BELOW 140/90: ICD-10-CM

## 2022-02-16 DIAGNOSIS — R07.9 CHEST PAIN, UNSPECIFIED TYPE: ICD-10-CM

## 2022-02-16 DIAGNOSIS — E55.9 VITAMIN D DEFICIENCY: ICD-10-CM

## 2022-02-16 DIAGNOSIS — Z72.0 TOBACCO ABUSE: Primary | ICD-10-CM

## 2022-02-16 DIAGNOSIS — Z87.2 H/O HIDRADENITIS SUPPURATIVA: ICD-10-CM

## 2022-02-16 DIAGNOSIS — M79.89 SWELLING OF LOWER EXTREMITY: ICD-10-CM

## 2022-02-16 DIAGNOSIS — K30 DELAYED GASTRIC EMPTYING: ICD-10-CM

## 2022-02-16 DIAGNOSIS — F31.9 BIPOLAR AFFECTIVE DISORDER, REMISSION STATUS UNSPECIFIED (H): ICD-10-CM

## 2022-02-16 PROCEDURE — 99605 MTMS BY PHARM NP 15 MIN: CPT | Performed by: PHARMACIST

## 2022-02-16 PROCEDURE — 99607 MTMS BY PHARM ADDL 15 MIN: CPT | Performed by: PHARMACIST

## 2022-02-16 RX ORDER — MULTIVITAMIN,THERAPEUTIC
1 TABLET ORAL DAILY
COMMUNITY

## 2022-02-16 NOTE — PROGRESS NOTES
Medication Therapy Management (MTM) Encounter    ASSESSMENT:                            Medication Adherence/Access: No issues identified    Smoking cessation:  Progressing, would benefit from continuation.of nicotine 14 mg patch daily and gum. Eventually in 4 weeks if doing well can consider decreasing to nicotine 7 mg daily for 2 weeks then stop nicotine replacement therapy .      Type 2 Diabetes: A1c well within goal <7%. Due to blood sugars in AM <80 may benefit from holding glipizide for now.     GERD/Delayed Gastric Emptying: Stable.     Asthma/COPD/Allergies: Defer to Pulmonology. Subjectively reporting symptoms improved, so reasonable to continue and monitor for now.     Vitamin D Deficiency: Stable. Vitamin D at goal of at least 30.     Hydradenitis Suppurativa/Eczema: Defer to dermatology.     Constipation: Stable.     Bipolar Affective Disorder/Anxiety: Stable.     Hypertension/Lower Extremity Edema: blood pressure at goal <140/90 mmHg.     Hyperlipidemia: Stable. LDL within goal.     Chest Pain: Stable.     PLAN:                            1. Hold glipizide ER for now - pharmacist will follow up with Rashmi Del Rio PA-C for authorization.   Update 2/17/2022: Rashmi Del Rio PA-C agreed to hold glipizide for now. Informed patient via [employer]. Called Ajit RN for home care to let her know of holding glipizide, left VM. Requested if fax needed to be sent for verification of this holding of glipizide. Will await to hear back.    Follow-up: 6 weeks with Rashmi Del Rio PA-C and 3 months with Lauren Bloch, JazielD, BCACP   Medication Therapy Management Pharmacist     SUBJECTIVE/OBJECTIVE:                          Swetha Patterson is a 51 year old female called for an initial visit. She was referred to me from Rashmi Del Rio PA-C.      Reason for visit: help with quitting smoking; comprehensive review of medications    Allergies/ADRs: Reviewed in chart  Past Medical History: Reviewed in chart  Tobacco: She reports  that she has quit smoking. Her smoking use included cigarettes. She started smoking about 37 years ago. She has a 15.00 pack-year smoking history. She quit smokeless tobacco use about 5 weeks ago.  Alcohol: not currently using    Medication Adherence/Access: Patient uses pill box(es).  Patient takes medications 3 time(s) per day.   Per patient, misses medication 0 times per week.   Home care nurse comes every couple of weeks to help fill pill box. Patient was using the medication list from nurse in order to update the medication list.   NNEKA Fong Case Manager with [facility] at [phone]    Smoking cessation:    Nicotine patch 14 mg daily   Nicotine 2 mg every 2-4 hours as needed    Working with Rashmi Del Rio PA-C for Pre-Bariatric Surgery Consult 1 month ago. Was referred for help with quitting smoking. Quit smoking 1.5 weeks ago. Previously smoking 1/2 pack per day. Using 3-4 pieces of gum per day. Patient reports things going well.  What has gone well for you? She is keeping her hands busy, cooking healthy meals. She is aware that she needs to be tobacco free for 3 months before surgery.     Type 2 Diabetes:    Metformin 500 mg daily  Glipizide ER 2.5 mg daily     Patient is not experiencing side effects. She does find some issues of diarrhea. Reports that she has dramatically changed diet and not eating as many carbohydrates. She reports that she has lost weight due to her dietary changes.   Blood sugar monitorin-2 time(s) daily. Ranges (patient reported): Fasting-  mg/dL  Symptoms of low blood sugar? Frequency of lows- 1 every week the last 2 weeks varying times  Symptoms of high blood sugar? none  Eye exam: up to date, updated 1 week ago  Foot exam: due  Aspirin: Not taking  Statin: Yes: atorvastatin 80 mg daily    ACEi/ARB: Yes: lisinopril 20 mg daily.   Urine Albumin:   Lab Results   Component Value Date    UMALCR 6.43 2020      Lab Results   Component Value Date     "A1C 5.8 01/24/2022    A1C 5.6 10/16/2020    A1C 6.0 02/20/2020    A1C 5.6 05/10/2019    A1C 5.4 03/21/2013     Weight today: 235 lb   Wt Readings from Last 4 Encounters:   02/09/22 242 lb (109.8 kg)   02/02/22 242 lb 1.6 oz (109.8 kg)   01/20/22 247 lb (112 kg)   01/10/22 247 lb (112 kg)     GERD/Delayed Gastric Emptying:   Omeprazole 20 mg twice daily   Ondansetron ODT 4 mg and NON-ODT 8 mg as needed    Hiatal hernia. Pt reports no current symptoms.  Patient feels that current regimen is effective. She has noticed an improvement with her weight loss. Reports that she gets very nauseous from pills so does take ondansetron every day.     Asthma/COPD/Allergies:   ICS/LAMA/LABA- Symbicort 2 puffs twice daily  Short-Acting Bronchodilator: Albuterol MDI, Nebs and pt reports using 5 times per week. Last time using nebulizing solution was last month.  Montelukast 10 mg daily   Cetirizine 10 mg daily      Patient rinses their mouth after using steroid inhaler. Reports that the frequency of the albterol has been her baseline. Could not afford Trelegy and reports it caused thrush. She will use albuterol when overexerts herself and when she is SOB from walking. Has been trying to walk more to help with weight loss. She finds that her \"lungs\"' feel better with weight loss. Finds effective. Last seen by Pulmonology 6/8/2021.   Patient is experiencing the following side effects: none.  Reports no sputum production.   Patient does not have an COPD Action Plan on file.   Has spirometry been completed: Yes     Vitamin D Deficiency:  Vitamin D 5000 three times weekly   Multivitamin 1 tablet daily     No concerns. History of low vitamin D back 2 years ago   Lab Results   Component Value Date    VITDT 54 01/24/2022     Hydradenitis Suppurativa/Eczema:   Bactrim DS twice daily   Nystatin powder daily   Terbinafine 1% as needed   Clindamycin 1% gel daily   Hydrocortisone 2.5% cream as needed    Reports that the antibiotic has been " somewhat helpful. Finds that nystatin powder helpful for skin folds, especially when she sweats more. Using terbinafine and clindamycin for HS symptoms on the abdomen.     Constipation:   Miralax as needed     Not using. Finding that with increasing her vegetables that she has been more regular, hasn't needed miralax.     Bipolar Affective Disorder/Anxiety   Lamotrigine 150 mg daily   Bupropion  mg daily   Hydroxyzine 50 mg three times daily   Clonazepam 1 mg and 0.5 mg tablets as needed     She will usually will do 0.5 mg if needed for anxiety over the 1 mg, bu thas both tablets that she will use both strengths for different reasons. She will take 1-2 times per week. Reports her nephew recently passed and neighbor below her was causing a lot of stress, however he just was recently evicted so some anxiety is now decreased, plan is to eventually taper back down the as needed clonazepam.     Hypertension/Lower Extremity Edema:   Amlodipine 2.5 mg daily  Lisinopril 20 mg daily   Furosemide 20 mg twice daily     Patient does self-monitor blood pressure. Home BP monitoring in range of 108-134's systolic over 79-82's diastolic.  Patient reports no current medication side effects. No dizziness or lightheadedness. She reports she has been more active since losing weight. Reports her lower legs are looking much better with the weight loss and watching salt intake.   BP Readings from Last 3 Encounters:   02/09/22 130/73   02/02/22 106/60   01/10/22 124/72     Hyperlipidemia:   Atrovastatin 80mg daily.      Patient reports no significant myalgias or other side effects.  Recent Labs   Lab Test 01/24/22  1403 02/20/20  0920 06/28/18  1330 05/15/15  1036 02/13/15  0831   CHOL 167 167   < > 238* 243*   HDL 42* 35*   < > 49* 46*   LDL 80 96   < > 128 132*   TRIG 227* 180*   < > 303* 323*   CHOLHDLRATIO  --   --   --  4.9 5.3*    < > = values in this interval not displayed.     Chest Pain:   Nitrostat as needed     Was last  seen by Cardiology 2/2/2022. Reports the chest pain is not exertional. Does get SOB with exertion. Stress test normal. Reports that she has chest pain at times on the left side, did work up with Cardiology. She does find effective for the chest pain. Finds that the issue resolves after taking singular tablet. Does get headache after.   ----------------    I spent 45 minutes with this patient today. All changes were made via verbal approval with Rashmi Del Rio PA-C. A copy of the visit note was provided to the patient's provider(s).    The patient was sent via [employer] a summary of these recommendations.     Lauren Bloch, PharmD, BCACP   Medication Therapy Management Pharmacist   [facility]    Telemedicine Visit Details  Type of service:  Telephone visit  Start Time: 3:00 PM  End Time: 3:50 PM  Originating Location (patient location): [place]  Distant Location (provider location):  [facility]     Medication Therapy Recommendations  Type 2 diabetes mellitus without complication, without long-term current use of insulin (H)    Current Medication: glipiZIDE (GLUCOTROL XL) 2.5 MG 24 hr tablet   Rationale: Undesirable effect - Adverse medication event - Safety   Recommendation: Discontinue Medication   Status: Accepted per Provider

## 2022-02-16 NOTE — Clinical Note
Ramone Low - my name is Hollie and I am the MTM pharmacist at the [facility]. I had met with Swetha and after hearing her new sugar numbers since her dietary changes she is having AM fastings from 70s-120s mg/dL and most post prandial number have been well within <180 mg/L. Rashmi Del Rio PA-C the provider that Rukhsana has seen at our clinic did okay holding the glipizide the day I saw her for now just due to those lower AM fasting numbers and she said ~1 low per week the last couple weeks, then I was going to reach out to her in 2 weeks to check in on sugars. If you have any concerns with this, please let me know. Thank you for your time and consideration. Hollie METZGER Phamacist

## 2022-02-17 NOTE — PATIENT INSTRUCTIONS
Recommendations from today's MTM visit:                                                    MTM (medication therapy management) is a service provided by a clinical pharmacist designed to help you get the most of out of your medicines.   Today we reviewed what your medicines are for, how to know if they are working, that your medicines are safe and how to make your medicine regimen as easy as possible.      1. Hold glipizide ER for now     Follow-up: 6 weeks with Rashmi Del Rio PA-C and 3 months with Lauren Bloch, PharmD, [facility]   Medication Therapy Management Pharmacist     It was great to speak with you today.  I value your experience and would be very thankful for your time with providing feedback on our clinic survey. You may receive a survey via email or text message in the next few days.       My Clinical Pharmacist's contact information:                                                      Please feel free to contact me with any questions or concerns you have.      Lauren Bloch, PharmD  Medication Therapy Management Pharmacist   [facility] [place]

## 2022-02-17 NOTE — PROGRESS NOTES
"Swetha Patterson is a 51 year old female who is being evaluated via a billable video visit.      The patient has been notified of following:     \"This video visit will be conducted via a call between you and your physician/provider. We have found that certain health care needs can be provided without the need for an in-person physical exam.  This service lets us provide the care you need with a video conversation.  If a prescription is necessary we can send it directly to your pharmacy.  If lab work is needed we can place an order for that and you can then stop by our lab to have the test done at a later time.    Video visits are billed at different rates depending on your insurance coverage.  Please reach out to your insurance provider with any questions.    If during the course of the call the physician/provider feels a video visit is not appropriate, you will not be charged for this service.\"    How would you like to obtain your AVS? MyChart  If the video visit is dropped, the invitation should be resent by: Text to cell phone: [phone]  Will anyone else be joining your video visit? No      Video-Visit Details    Type of service:  Video Visit    Video Start Time: 10:24 am  Video End Time: 10:53 am    Originating Location (pt. Location): Home    Distant Location (provider location):  [facility] [place]     Platform used for Video Visit: Mobile2Me    During this virtual visit the patient is located in [place], patient verifies this as the location during the entirety of this visit.     New Bariatric Nutrition Consultation Note    Reason For Visit: Nutrition Assessment    Swetha Patterson is a 51 year old presenting today for return bariatric nutrition consult.   Pt is interested in laparoscopic sleeve gastrectomy.    This is pt's 2nd of 3 required nutrition visits prior to surgery.     Pt referred by NOE Blake on January 20, 2022.    Coordination note:   Needs baseline labs - " "Done 1/24/22  Needs Psych eval  - Will Call Jonny  Needs letter of support from therapist and psychiatrist   Needs clearance from sleep medicine - Seeing 3/25/22  Needs PCP letter of support  10 lb weight loss from initial - Met  Surgeon meet and greet with Dr. Leach  No nicotine for 3 months prior to surgery - Working on  Sobriety from alcohol for 1 year - May 2022     Patient with Co-morbidities of obesity including:  Type II DM yes, last A1C 5.8 on 1/24/22  Renal Failure no  Sleep apnea yes  Hypertension yes  Dyslipidemia yes  Joint pain no  Back pain no  GERD no     PMH: fatty liver (nonalcoholic per pt), fibromyalgia, cholecystomy, asthma    Support System Reviewed With Patient 1/19/2022   Who do you have in your support network that can be available to help you for the first 2 weeks after surgery? I have my sister Rukhsana a very close friend of keri Mckinley and I have my dad and I also have psychiatrist my sister Rukhsana Kearns my dad and my psychiatrist again Rukhsana Milian   Who can you count on for support throughout your weight loss surgery journey? Rukhsana Kearns my dad my psychiatrist       ANTHROPOMETRICS:  Consult weight 1/20/22: 247 lbs with BMI 43.75    Estimated body mass index is 40.74 kg/m  as calculated from the following:    Height as of 2/2/22: 1.6 m (5' 3\").    Weight as of this encounter: 104.3 kg (230 lb).     Current weight: 230 lbs, pt report    Required weight loss goal pre-op: 10 lbs from initial consult weight (goal weight 237 lbs or less before surgery)       1/19/2022   I have tried the following methods to lose weight Watching portions or calories, Exercise, Physician directed program       Weight Loss Questions Reviewed With Patient 1/19/2022   How long have you been overweight? From Middle age and beyond       SUPPLEMENT INFORMATION:  Vit D 5000 IUS/ MWF    Vit D WNL 1/24/22, hx of vit d def    NUTRITION HISTORY:  NKFA    Appointment today focused on reviewing the " dietary guidelines for surgery and answering pt questions.     Pt reports she wants surgery to improve her health. Recent diabetes dx.  Pt shared she doesn t know much portions or what she should be eating in general.     Working on cutting out pop.  Does not eat a lot of bread unless having a burger.     Fluids: diet pop occ (small amounts per pt - likes the carbonation),  juice rarely, lots of water (up to 4-5 12 oz bottles per pt, 48 -60 oz). Takes a water pill per pt.     Currently eats fast and does not chew well per pt. Pt reports overeating to the point she will involuntarily vomit.     Pt seen at [facility] previously - not able to have surgery at that time due to mental health/alcohol concerns.     Pt s friend had sleeve surgery - is familiar with some of it through her. Went well for friend.    Nicotine: Smoked for ~40+ years per pt. Quit cigarettes two weeks ago, using patch and gum. Refer to [facility]. None for 3 months prior to surgery    Alcohol: hx of abuse. Sober since May 2021. No surgery until May 2022    Feb 2022:    Pt reports things are going well. Feeling better physically. COPD symptoms improving.     Has been having bad pain from hiatal hernia.     WAs able to get off Glipizide yesterday - will monitor next few weeks.     Lost 17 lbs last month.    Example meal: wrap with vegetables   Example snack: sugar-free fruit bar, fruit cups, bananas    Eating differently - doing less carbs, not eating potatoes, bread, or rice currently.   Drinking lots of water.  Doing leaner protein and no red meat.  Reading labels - trying to do lower fat and lower sugar  No fast food  Reducing portion sizes - measuring food    Drinking lots of water - about 6 bottles per day per pt.     Still drinking fluids with meal time - but use to  slam  water at meal time and has started drinking slower.   Drinking tomato juice occ - dilutes with water.     Nicotine - quit smoking about 1 week ago. Using patch currently.      PA: lots of walking, no treadmill yet due to COPD.       Recall Diet Questions Reviewed With Patient 1/19/2022   Describe what you typically consume for breakfast (typical or most recent): I usually don't eat much for breakfast maybe a piece of toast.   Describe what you typically consume for lunch (typical or most recent): Sometimes hot dogs or macaroni and cheese or hamburgers or fast food or eggs or just about anything   Describe what you typically consume for supper (typical or most recent): Always have to have meat with my dinner potato vegetable if it's corn or green beans but I prefer peas and then I snack at night time before bed because I'm so hungry   Describe what you typically consume as snacks (typical or most recent): Cereal bars chips sometimes fruit a hamburger fast food   How many ounces of water, or other low calorie drinks, do you drink daily (8 oz=1 glass)? 48 oz   How many ounces of caffeine (coffee, tea, pop) do you drink daily (8 oz=1 glass)? 8 oz   How many ounces of carbonated (pop, beer, sparkling water) drinks do you drinky daily (8 oz=1 glass)? 8 oz   How many ounces of juice, pop, sweet tea, sports drinks, protein drinks, other sweetened drinks, do you drink daily (8 oz=1 glass)? 24 oz   How many ounces of milk do you drink daily (8 oz=1 glass) 8 oz   Please indicate the type of milk: 2%   How often do you drink alcohol? Never   If you do drink alcohol, how many drinks might you have in a day? (one drink = 5 oz. wine, 1 can/bottle of beer, 1 shot liquor) -       Eating Habits 1/19/2022   Do you have any dietary restrictions? Yes   Do you currently binge eat (eat a large amount of food in a short time)? -   Are you an emotional eater? No   Do you get up to eat after falling asleep? Yes   What foods do you crave? I crave Pizza macaroni and cheese [employer] hamburger french fries but I do still eat my vegetables     Dining Out History Reviewed With Patient 1/19/2022   How often do you dine  out? A couple of times a week.   Where do you dine out? (select all that apply) fast food chains, take out   What types of food do you order when you dine out? Some type of meat potato and of course you got to have a green beans or corn       Physical Activity Reviewed With Patient 1/19/2022   How often do you exercise? Daily   What is the duration of your exercise (in minutes)? 60+ Minutes   What types of exercise do you do? walking, other   What keeps you from being more active?  I am as active as I can possbily be, Shortness of breath       NUTRITION DIAGNOSIS:  Obesity r/t long history of positive energy balance aeb BMI >30 kg/m2.    INTERVENTION:  Intervention Provided/Education Provided on post-op diet guidelines, vitamins/minerals essential post-operatively, GI anatomy of bariatric surgeries, ways to help prepare for post-op diet guidelines pre-operatively, portion/calorie-control, mindful eating and sources of protein.  Patient demonstrates understanding. Provided pt with list of goals RD contact information.      Questions Reviewed With Patient 1/19/2022   How ready are you to make changes regarding your weight? Number 1 = Not ready at all to make changes up to 10 = very ready. 10   How confident are you that you can change? 1 = Not confident that you will be successful making changes up to 10 = very confident. 10       Expected Engagement: good    GOALS:  Call Jonny at [phone] for scheduling psych eval    Keep up the great work with dietary goals!    Relating To Eating:  Eat slowly (20-30 minutes per meal), chewing foods well (25 chews per bite/applesauce consistency)   - Set utensil/food down between bites  Focus on eating smaller portion sizes at meals and snacks    Relating to beverages:  Reduce caffeine/carbonation/calorie containing beverages  Separate fluids from meals by 30 minutes before, during, and after eating  Avoid alcohol    Relating to dietary supplements:  Start a multivitamin  containing iron daily     High Fiber Foods:  Bran cereal, 1/3 cup, 8.6 gm fiber   Cooked kidney beans   cup 7.9 gm  Cooked lentils   cup 7.8 gm  Cooked black beans   cup 7.6 gm  Canned chickpeas   cup 5.3 gm  Baked beans   cup 5.2 gm  Pear 1 5.1 gm  Soybeans   cup 5.1 gm  Quinoa   cup 5 gm  Baked sweet potato, with skin 1 medium 4.8 gm  Baked potato, with skin 1 medium 4.4 gm  Cooked frozen green peas   cup 4.4 gm  Bulgur   cup 4.1 gm  Cooked frozen mixed vegetables   cup 4 gm  Raspberries   cup 4 gm  Blackberries   cup 3.8 gm  Almonds 1 oz 3.5 gm  Cooked frozen spinach   cup 3.5 gm  Vegetable or soy yony 1 each 3.4 gm  Apple 1 medium 3.3 gm  Dried dates 5 pieces 3.3 gm      Surgery resources:  Diet Guidelines after Weight Loss Surgery  [email or web address]     Post-op Diet Advancement Schedule:  Clear Liquid Diet (stage 1):  TBD   *No RED, PROTEIN or PULP day before surgery  Low-Fat Full Liquid Diet (stage 2): TBD (2 weeks total between clear/liquid)  Pureed Diet (stage 3): TBD (2 weeks)  Soft Diet (stage 4): TBD (4 weeks)  Regular Diet (stage 5): TBD     Take the following after a Sleeve Gastrectomy:  - Multivitamin/minerals: adult dose 1-2 times daily  Ideally want one that provides:   5,000 - 10,000 international units vitamin A daily              800 mg oral folate daily  8 - 22 mg zinc and 1 - 2 mg copper daily  12 mg Thiamine  - Iron: 45-60 mg elemental (18-36 mg if low risk) - may partly or fully be covered in multivitamin   - Calcium Citrate containing vitamin D: 500 mg 3 times daily or 600 mg 2 times daily              - Separate the calcium from your multivitamin or iron by at least 2 hours.               - Must be a chewable calcium citrate until post-op 3 months               - Options for calcium citrate: Beni calcium citrate chewable, bariatric advantage calcium citrate chewable, Celebrate vitamins calcium citrate chewable, Bariatric Fusion calcium citrate chewable  - Vitamin D - at least  3,000 international units/day between all supplements  - Vitamin B12: sublingual form of at least 500 mcg daily or injection of 1000 mcg monthly         Weight loss resources:    Diabetes Plate Method:  https://www.diabetesfoodhub.org/articles/what-is-the-diabetes-plate-method.html    Diabetes Recipe Resources:  https://www.diabetesfoodhub.org/   https://www.cdc.gov/diabetes/library/spotlights/hack-your-snack.html   https://www.eatright.org/food/nutrition/dietary-guidelines-and-myplate/how-to-add-whole-grains-to-your-diet   https://[employer]/recipes   https://www.diabetes.org/healthy-living/recipes-nutrition   https://[employer]x/diabetic-recipe/turkey-veggie-snacks     Carbohydrates  [email or web address]     Carbohydrate Counting  [email or web address]     Guide To Carbohydrate Counting  [email or web address]    Protein Sources   [email or web address]      Mindful Eating  [email or web address]      Summary of Volumetrics Eating Plan  [email or web address]        Follow up: Friday, March 18th at 11:00 am    Time spent with patient: 29 minutes.  SHELLY Neal, RD, LD

## 2022-02-18 ENCOUNTER — VIRTUAL VISIT (OUTPATIENT)
Dept: ENDOCRINOLOGY | Facility: CLINIC | Age: 52
End: 2022-02-18
Payer: COMMERCIAL

## 2022-02-18 VITALS — WEIGHT: 230 LBS | BODY MASS INDEX: 40.74 KG/M2

## 2022-02-18 DIAGNOSIS — Z71.3 NUTRITIONAL COUNSELING: Primary | ICD-10-CM

## 2022-02-18 DIAGNOSIS — E66.01 CLASS 3 SEVERE OBESITY DUE TO EXCESS CALORIES WITH SERIOUS COMORBIDITY AND BODY MASS INDEX (BMI) OF 40.0 TO 44.9 IN ADULT (H): ICD-10-CM

## 2022-02-18 DIAGNOSIS — E66.813 CLASS 3 SEVERE OBESITY DUE TO EXCESS CALORIES WITH SERIOUS COMORBIDITY AND BODY MASS INDEX (BMI) OF 40.0 TO 44.9 IN ADULT (H): ICD-10-CM

## 2022-02-18 DIAGNOSIS — F41.9 ANXIETY: ICD-10-CM

## 2022-02-18 PROCEDURE — 97803 MED NUTRITION INDIV SUBSEQ: CPT | Mod: GT | Performed by: DIETITIAN, REGISTERED

## 2022-02-18 NOTE — PATIENT INSTRUCTIONS
GOALS:  Call Jonny at [phone] for scheduling psych eval    Keep up the great work with dietary goals!    Relating To Eating:  Eat slowly (20-30 minutes per meal), chewing foods well (25 chews per bite/applesauce consistency)   - Set utensil/food down between bites  Focus on eating smaller portion sizes at meals and snacks    Relating to beverages:  Reduce caffeine/carbonation/calorie containing beverages  Separate fluids from meals by 30 minutes before, during, and after eating  Avoid alcohol    Relating to dietary supplements:  Start a multivitamin containing iron daily     High Fiber Foods:  Bran cereal, 1/3 cup, 8.6 gm fiber   Cooked kidney beans   cup 7.9 gm  Cooked lentils   cup 7.8 gm  Cooked black beans   cup 7.6 gm  Canned chickpeas   cup 5.3 gm  Baked beans   cup 5.2 gm  Pear 1 5.1 gm  Soybeans   cup 5.1 gm  Quinoa   cup 5 gm  Baked sweet potato, with skin 1 medium 4.8 gm  Baked potato, with skin 1 medium 4.4 gm  Cooked frozen green peas   cup 4.4 gm  Bulgur   cup 4.1 gm  Cooked frozen mixed vegetables   cup 4 gm  Raspberries   cup 4 gm  Blackberries   cup 3.8 gm  Almonds 1 oz 3.5 gm  Cooked frozen spinach   cup 3.5 gm  Vegetable or soy yony 1 each 3.4 gm  Apple 1 medium 3.3 gm  Dried dates 5 pieces 3.3 gm      Surgery resources:  Diet Guidelines after Weight Loss Surgery  [email or web address]     Post-op Diet Advancement Schedule:  Clear Liquid Diet (stage 1):  TBD   *No RED, PROTEIN or PULP day before surgery  Low-Fat Full Liquid Diet (stage 2): TBD (2 weeks total between clear/liquid)  Pureed Diet (stage 3): TBD (2 weeks)  Soft Diet (stage 4): TBD (4 weeks)  Regular Diet (stage 5): TBD     Take the following after a Sleeve Gastrectomy:  - Multivitamin/minerals: adult dose 1-2 times daily  Ideally want one that provides:   5,000 - 10,000 international units vitamin A daily              800 mg oral folate daily  8 - 22 mg zinc and 1 - 2 mg copper daily  12 mg Thiamine  - Iron: 45-60 mg  elemental (18-36 mg if low risk) - may partly or fully be covered in multivitamin   - Calcium Citrate containing vitamin D: 500 mg 3 times daily or 600 mg 2 times daily              - Separate the calcium from your multivitamin or iron by at least 2 hours.               - Must be a chewable calcium citrate until post-op 3 months               - Options for calcium citrate: Beni calcium citrate chewable, bariatric advantage calcium citrate chewable, Celebrate vitamins calcium citrate chewable, Bariatric Fusion calcium citrate chewable  - Vitamin D - at least 3,000 international units/day between all supplements  - Vitamin B12: sublingual form of at least 500 mcg daily or injection of 1000 mcg monthly         Weight loss resources:    Diabetes Plate Method:  https://www.diabetesfoodhub.org/articles/what-is-the-diabetes-plate-method.html    Diabetes Recipe Resources:  https://www.diabetesfoodPhillyb.org/   https://www.cdc.gov/diabetes/library/spotlights/hack-your-snack.html   https://www.eatright.org/food/nutrition/dietary-guidelines-and-myplate/how-to-add-whole-grains-to-your-diet   https://[employer]/recipes   https://www.diabetes.org/healthy-living/recipes-nutrition   https://[employer]/diabetic-recipe/turkey-veggie-snacks     Carbohydrates  [email or web address]     Carbohydrate Counting  [email or web address]     Guide To Carbohydrate Counting  [email or web address]    Protein Sources   [email or web address]      Mindful Eating  [email or web address]      Summary of Volumetrics Eating Plan  [email or web address]        Follow up: Friday, March 18th at 11:00 am    SHELLY Henning, RD, LD  Clinic #: [phone]

## 2022-02-18 NOTE — LETTER
"2/18/2022       RE: Swetha Patterson  [address]  [facility] [address]     Dear Colleague,    Thank you for referring your patient, Swetha Patterson, to the [facility] at [facility]. Please see a copy of my visit note below.    Swetha Patterson is a 51 year old female who is being evaluated via a billable video visit.      The patient has been notified of following:     \"This video visit will be conducted via a call between you and your physician/provider. We have found that certain health care needs can be provided without the need for an in-person physical exam.  This service lets us provide the care you need with a video conversation.  If a prescription is necessary we can send it directly to your pharmacy.  If lab work is needed we can place an order for that and you can then stop by our lab to have the test done at a later time.    Video visits are billed at different rates depending on your insurance coverage.  Please reach out to your insurance provider with any questions.    If during the course of the call the physician/provider feels a video visit is not appropriate, you will not be charged for this service.\"    How would you like to obtain your AVS? MyChart  If the video visit is dropped, the invitation should be resent by: Text to cell phone: [phone]  Will anyone else be joining your video visit? No      Video-Visit Details    Type of service:  Video Visit    Video Start Time: 10:24 am  Video End Time: 10:53 am    Originating Location (pt. Location): Home    Distant Location (provider location):  [facility]     Platform used for Video Visit: GeeYee    During this virtual visit the patient is located in [place], patient verifies this as the location during the entirety of this visit.     New Bariatric Nutrition Consultation Note    Reason For Visit: Nutrition " "Assessment    Swetha Patterson is a 51 year old presenting today for return bariatric nutrition consult.   Pt is interested in laparoscopic sleeve gastrectomy.    This is pt's 2nd of 3 required nutrition visits prior to surgery.     Pt referred by NOE Blake on January 20, 2022.    Coordination note:   Needs baseline labs - Done 1/24/22  Needs Psych eval  - Will Call Jonny  Needs letter of support from therapist and psychiatrist   Needs clearance from sleep medicine - Seeing 3/25/22  Needs PCP letter of support  10 lb weight loss from initial - Met  Surgeon meet and greet with Dr. Leach  No nicotine for 3 months prior to surgery - Working on  Sobriety from alcohol for 1 year - May 2022     Patient with Co-morbidities of obesity including:  Type II DM yes, last A1C 5.8 on 1/24/22  Renal Failure no  Sleep apnea yes  Hypertension yes  Dyslipidemia yes  Joint pain no  Back pain no  GERD no     PMH: fatty liver (nonalcoholic per pt), fibromyalgia, cholecystomy, asthma    Support System Reviewed With Patient 1/19/2022   Who do you have in your support network that can be available to help you for the first 2 weeks after surgery? I have my sister Rukhsana a very close friend of keri Mckinley and I have my dad and I also have psychiatrist my sister Rukhsana Kearns my dad and my psychiatrist again Rukhsana Milian   Who can you count on for support throughout your weight loss surgery journey? Rukhsana Kearns my dad my psychiatrist       ANTHROPOMETRICS:  Consult weight 1/20/22: 247 lbs with BMI 43.75    Estimated body mass index is 40.74 kg/m  as calculated from the following:    Height as of 2/2/22: 1.6 m (5' 3\").    Weight as of this encounter: 104.3 kg (230 lb).     Current weight: 230 lbs, pt report    Required weight loss goal pre-op: 10 lbs from initial consult weight (goal weight 237 lbs or less before surgery)       1/19/2022   I have tried the following methods to lose weight Watching portions or " calories, Exercise, Physician directed program       Weight Loss Questions Reviewed With Patient 1/19/2022   How long have you been overweight? From Middle age and beyond       SUPPLEMENT INFORMATION:  Vit D 5000 IUS/ MWF    Vit D WNL 1/24/22, hx of vit d def    NUTRITION HISTORY:  NKFA    Appointment today focused on reviewing the dietary guidelines for surgery and answering pt questions.     Pt reports she wants surgery to improve her health. Recent diabetes dx.  Pt shared she doesn t know much portions or what she should be eating in general.     Working on cutting out pop.  Does not eat a lot of bread unless having a burger.     Fluids: diet pop occ (small amounts per pt - likes the carbonation),  juice rarely, lots of water (up to 4-5 12 oz bottles per pt, 48 -60 oz). Takes a water pill per pt.     Currently eats fast and does not chew well per pt. Pt reports overeating to the point she will involuntarily vomit.     Pt seen at [facility] previously - not able to have surgery at that time due to mental health/alcohol concerns.     Pt s friend had sleeve surgery - is familiar with some of it through her. Went well for friend.    Nicotine: Smoked for ~40+ years per pt. Quit cigarettes two weeks ago, using patch and gum. Refer to MTM. None for 3 months prior to surgery    Alcohol: hx of abuse. Sober since May 2021. No surgery until May 2022    Feb 2022:    Pt reports things are going well. Feeling better physically. COPD symptoms improving.     Has been having bad pain from hiatal hernia.     WAs able to get off Glipizide yesterday - will monitor next few weeks.     Lost 17 lbs last month.    Example meal: wrap with vegetables   Example snack: sugar-free fruit bar, fruit cups, bananas    Eating differently - doing less carbs, not eating potatoes, bread, or rice currently.   Drinking lots of water.  Doing leaner protein and no red meat.  Reading labels - trying to do lower fat and lower sugar  No fast  food  Reducing portion sizes - measuring food    Drinking lots of water - about 6 bottles per day per pt.     Still drinking fluids with meal time - but use to  slam  water at meal time and has started drinking slower.   Drinking tomato juice occ - dilutes with water.     Nicotine - quit smoking about 1 week ago. Using patch currently.     PA: lots of walking, no treadmill yet due to COPD.       Recall Diet Questions Reviewed With Patient 1/19/2022   Describe what you typically consume for breakfast (typical or most recent): I usually don't eat much for breakfast maybe a piece of toast.   Describe what you typically consume for lunch (typical or most recent): Sometimes hot dogs or macaroni and cheese or hamburgers or fast food or eggs or just about anything   Describe what you typically consume for supper (typical or most recent): Always have to have meat with my dinner potato vegetable if it's corn or green beans but I prefer peas and then I snack at night time before bed because I'm so hungry   Describe what you typically consume as snacks (typical or most recent): Cereal bars chips sometimes fruit a hamburger fast food   How many ounces of water, or other low calorie drinks, do you drink daily (8 oz=1 glass)? 48 oz   How many ounces of caffeine (coffee, tea, pop) do you drink daily (8 oz=1 glass)? 8 oz   How many ounces of carbonated (pop, beer, sparkling water) drinks do you drinky daily (8 oz=1 glass)? 8 oz   How many ounces of juice, pop, sweet tea, sports drinks, protein drinks, other sweetened drinks, do you drink daily (8 oz=1 glass)? 24 oz   How many ounces of milk do you drink daily (8 oz=1 glass) 8 oz   Please indicate the type of milk: 2%   How often do you drink alcohol? Never   If you do drink alcohol, how many drinks might you have in a day? (one drink = 5 oz. wine, 1 can/bottle of beer, 1 shot liquor) -       Eating Habits 1/19/2022   Do you have any dietary restrictions? Yes   Do you currently  binge eat (eat a large amount of food in a short time)? -   Are you an emotional eater? No   Do you get up to eat after falling asleep? Yes   What foods do you crave? I crave Pizza macaroni and cheese [employer] hamburger french fries but I do still eat my vegetables     Dining Out History Reviewed With Patient 1/19/2022   How often do you dine out? A couple of times a week.   Where do you dine out? (select all that apply) fast food chains, take out   What types of food do you order when you dine out? Some type of meat potato and of course you got to have a green beans or corn       Physical Activity Reviewed With Patient 1/19/2022   How often do you exercise? Daily   What is the duration of your exercise (in minutes)? 60+ Minutes   What types of exercise do you do? walking, other   What keeps you from being more active?  I am as active as I can possbily be, Shortness of breath       NUTRITION DIAGNOSIS:  Obesity r/t long history of positive energy balance aeb BMI >30 kg/m2.    INTERVENTION:  Intervention Provided/Education Provided on post-op diet guidelines, vitamins/minerals essential post-operatively, GI anatomy of bariatric surgeries, ways to help prepare for post-op diet guidelines pre-operatively, portion/calorie-control, mindful eating and sources of protein.  Patient demonstrates understanding. Provided pt with list of goals RD contact information.      Questions Reviewed With Patient 1/19/2022   How ready are you to make changes regarding your weight? Number 1 = Not ready at all to make changes up to 10 = very ready. 10   How confident are you that you can change? 1 = Not confident that you will be successful making changes up to 10 = very confident. 10       Expected Engagement: good    GOALS:  Call Jonny at [phone] for scheduling psych eval    Keep up the great work with dietary goals!    Relating To Eating:  Eat slowly (20-30 minutes per meal), chewing foods well (25 chews per bite/applesauce  consistency)   - Set utensil/food down between bites  Focus on eating smaller portion sizes at meals and snacks    Relating to beverages:  Reduce caffeine/carbonation/calorie containing beverages  Separate fluids from meals by 30 minutes before, during, and after eating  Avoid alcohol    Relating to dietary supplements:  Start a multivitamin containing iron daily     High Fiber Foods:  Bran cereal, 1/3 cup, 8.6 gm fiber   Cooked kidney beans   cup 7.9 gm  Cooked lentils   cup 7.8 gm  Cooked black beans   cup 7.6 gm  Canned chickpeas   cup 5.3 gm  Baked beans   cup 5.2 gm  Pear 1 5.1 gm  Soybeans   cup 5.1 gm  Quinoa   cup 5 gm  Baked sweet potato, with skin 1 medium 4.8 gm  Baked potato, with skin 1 medium 4.4 gm  Cooked frozen green peas   cup 4.4 gm  Bulgur   cup 4.1 gm  Cooked frozen mixed vegetables   cup 4 gm  Raspberries   cup 4 gm  Blackberries   cup 3.8 gm  Almonds 1 oz 3.5 gm  Cooked frozen spinach   cup 3.5 gm  Vegetable or soy yony 1 each 3.4 gm  Apple 1 medium 3.3 gm  Dried dates 5 pieces 3.3 gm      Surgery resources:  Diet Guidelines after Weight Loss Surgery  [email or web address]     Post-op Diet Advancement Schedule:  Clear Liquid Diet (stage 1):  TBD   *No RED, PROTEIN or PULP day before surgery  Low-Fat Full Liquid Diet (stage 2): TBD (2 weeks total between clear/liquid)  Pureed Diet (stage 3): TBD (2 weeks)  Soft Diet (stage 4): TBD (4 weeks)  Regular Diet (stage 5): TBD     Take the following after a Sleeve Gastrectomy:  - Multivitamin/minerals: adult dose 1-2 times daily  Ideally want one that provides:   5,000 - 10,000 international units vitamin A daily              800 mg oral folate daily  8 - 22 mg zinc and 1 - 2 mg copper daily  12 mg Thiamine  - Iron: 45-60 mg elemental (18-36 mg if low risk) - may partly or fully be covered in multivitamin   - Calcium Citrate containing vitamin D: 500 mg 3 times daily or 600 mg 2 times daily              - Separate the calcium from your  multivitamin or iron by at least 2 hours.               - Must be a chewable calcium citrate until post-op 3 months               - Options for calcium citrate: Beni calcium citrate chewable, bariatric advantage calcium citrate chewable, Celebrate vitamins calcium citrate chewable, Bariatric Fusion calcium citrate chewable  - Vitamin D - at least 3,000 international units/day between all supplements  - Vitamin B12: sublingual form of at least 500 mcg daily or injection of 1000 mcg monthly         Weight loss resources:    Diabetes Plate Method:  https://www.diabetesfoodhub.org/articles/what-is-the-diabetes-plate-method.html    Diabetes Recipe Resources:  https://www.diabetesfoodhub.org/   https://www.cdc.gov/diabetes/library/spotlights/hack-your-snack.html   https://www.eatright.org/food/nutrition/dietary-guidelines-and-myplate/how-to-add-whole-grains-to-your-diet   https://Kiddie Kist/recipes   https://www.diabetes.org/healthy-living/recipes-nutrition   https://Kiddie Kist/diabetic-recipe/turkey-veggie-snacks     Carbohydrates  [email or web address]     Carbohydrate Counting  [email or web address]     Guide To Carbohydrate Counting  [email or web address]    Protein Sources   [email or web address]      Mindful Eating  [email or web address]      Summary of Volumetrics Eating Plan  [email or web address]        Follow up: Friday, March 18th at 11:00 am    Time spent with patient: 29 minutes.  SHELLY Neal, RD, LD

## 2022-02-21 RX ORDER — HYDROXYZINE HYDROCHLORIDE 50 MG/1
TABLET, FILM COATED ORAL
Qty: 70 TABLET | Refills: 1 | Status: SHIPPED | OUTPATIENT
Start: 2022-02-21 | End: 2022-03-15

## 2022-02-22 ENCOUNTER — TELEPHONE (OUTPATIENT)
Dept: NEUROPSYCHOLOGY | Facility: CLINIC | Age: 52
End: 2022-02-22
Payer: COMMERCIAL

## 2022-02-22 ENCOUNTER — TELEPHONE (OUTPATIENT)
Dept: ENDOCRINOLOGY | Facility: CLINIC | Age: 52
End: 2022-02-22
Payer: COMMERCIAL

## 2022-02-22 NOTE — TELEPHONE ENCOUNTER
Patient called to schedule pre-bariatric surgery psychological evaluation with Dr. Diaz. Scheduled Monday, May 9 at 8:30 a.m., to plan on 2 hours for a video visit to include interview followed by MMPI testing.

## 2022-02-22 NOTE — TELEPHONE ENCOUNTER
Patient called to schedule pre-bariatric surgery psychological evaluation with Dr. Diaz. Offered first available which is May 2 at 8:30 a.m., video visit. Patient states she is having surgery in May and wanted an earlier appointment. Gave her the names and phone numbers of Yeni Mullins and Henok Hartman.

## 2022-02-22 NOTE — TELEPHONE ENCOUNTER
Beverly Fuller Pt called back and wanted to take the May 2nd appointment.    Please call [phone]

## 2022-02-23 ENCOUNTER — TELEPHONE (OUTPATIENT)
Dept: SURGERY | Facility: CLINIC | Age: 52
End: 2022-02-23

## 2022-02-23 ENCOUNTER — CARE COORDINATION (OUTPATIENT)
Dept: ENDOCRINOLOGY | Facility: CLINIC | Age: 52
End: 2022-02-23
Payer: COMMERCIAL

## 2022-02-23 NOTE — PROGRESS NOTES
Tasklist updated and sent to patient via [employer] with NBS pt ed and letters for clearances.    Bariatric Task List    Fax:  Please fax all paperwork to: [phone] -     Status:  Is patient a candidate for bariatric surgery?:  patient is not a candidate for bariatric surgery - 10/22/21  Referral from Lisa Harris,DO for weight loss. bks   Cleared to schedule surgeon consult?:    - See Dr Leach to discuss sleeve after psych eval, smoking cessation, and ltr from mental health provider(s) done   Status:  surgery evaluation in process -     Surgeon: Dr Rogers Leach -     Tentative surgery month/year: To be determined if a candidate for surgery after clearances. -        Insurance: Insurance:  [employer] -      Contact insurance to discuss coverage: Needed -          Patient Info: Initial Weight:  247 -     Date of Initial Weight/Height:  1/20/2022 -     Goal Weight (lbs):  237 -     Required Weight Loss:  10 -     Surgery Type:  sleeve gastrectomy -        Dietician Visits: Structured weight loss required by insurance?:  structured weight loss required -     Dietician Visit 1:  Completed - 1/20/22 in [facility]. [facility]   Dietician Visit 2:  Completed - 2/18/22 in Epic. bks   Dietician Visit 3:  Needed - 3/18/22 appt. bks   Dietician Visit 4:    -     Dietician Visit 5:    -     Dietician Visit 6:    -     Dietician Visit additional:  Needed - Monthly until surgery for further weight loss and postop diet teaching. bks      Psychological Evaluation: Psych eval:  Needed - 5/9/22 Dr Diaz appt. bks   Therapist letter of support:  Needed - Needed if you are seeing a therapist. bks   Psychiatrist letter of support:  Needed - Needed if you are seeing a psychiatrist. bks   Other:  It helps to have the letters of support from your mental health providers before your Dr Diaz appt. bks -        Lab Work: Complete Blood Count:  Completed - 1/24/22 in [facility]   Comprehensive Metabolic Panel:  Completed - 1/24/22 in Epic. bks  "  Vitamin D:  Completed - 1/24/22 in Epic. bks   PTH:  Completed - 1/24/22 in Epic. bks   Hgb A1c:  Completed - 1/24/22 in Epic. bks    Lipids: Completed - 1/24/22 in Epic. bks   Nicotine Testing:  Needed  -  Check nicotine level 1 month after stopping all nicotine products. A possible surgery date would be 2 months after negative nicotine level. bks      Consults/ Clearance Sleep Medicine:  Needed - 3/25/22 Debra montenegro. bks   Pulmonology:  Needed - (COPD)      PCP: PCP letter of support:  Needed - PCP 12/15/21 [facility] Referral from Lisa Harris DO. bks      Stopping Smoking/ Alcohol Use: Quit tobacco use (3 months smoke free)?:  Needed -    Quit date:    - What is your official quit date?       Patient Education:  Information Session:  Needed - Let us know the date you view the on-line Seminar. bks   Given \"Making your decision\" handout?:  Yes -     Given \"A Roadmap to you Weight Loss Surgery\" handout?: Yes -     Given \"Get Well Loop\" information?: Yes -     Given support group information?:    -     Attended support group?:  Needed -     Support plan in place?:  Completed - Family and friends. bks      Final Tasks:  Before surgery online class:  Needed -     Before surgery online class website link:  [email or web address]   After surgery online class:  Needed -     After surgery online class website link:  [email or web address]   Nurse visit per clinic:  Needed -     History and Physical per clinic:   -     Final labs per clinic: Needed -        Notes: Please register for the Get Well Loop when you get an email invitation and a surgery date.     The Get Well Loop will give you information via email or text messages that can help you be more successful before and after surgery.  It can also help answer any questions you may have.    Get Well Loop Information  [email or web address]          "

## 2022-02-24 ENCOUNTER — TELEPHONE (OUTPATIENT)
Dept: INTERNAL MEDICINE | Facility: CLINIC | Age: 52
End: 2022-02-24
Payer: COMMERCIAL

## 2022-02-24 ENCOUNTER — TELEPHONE (OUTPATIENT)
Dept: PULMONOLOGY | Facility: CLINIC | Age: 52
End: 2022-02-24
Payer: COMMERCIAL

## 2022-02-24 NOTE — TELEPHONE ENCOUNTER
Patient calling  She needs a referral to the [facility] weight loss. Dr Shakila Verma. Fax [phone]   Patient is not sure why but she is doing what she was told to do. Please advise. Ok to call and  [phone]

## 2022-02-24 NOTE — TELEPHONE ENCOUNTER
Please see note below  This writer was going to schedule pre-op appointment but surgery won't be scheduled without your approval it appears.    Is provider willing to write letter stating approve of surgery? Recommendation?  From 11/2/2021 referral to bariatric surgery at  was completed.  Or appointment?  Please advise thanks

## 2022-02-24 NOTE — TELEPHONE ENCOUNTER
Spoke with pt about scheduling visit for pre surgery pulmonary clearance. Pt is scheduled for 3/2. Did inform pt that if she fails to give us notice if she cannot make upcoming appointment we will not be able to schedule her anymore and she would be released from our care. Pt stated she understood. Details confirmed.

## 2022-02-25 NOTE — TELEPHONE ENCOUNTER
Call to patient. Advised. States she has these appointments scheduled. Suggested patient call back once appointments have been completed and Dr. Low can review the appointment notes. Patient agrees.

## 2022-02-28 ENCOUNTER — TELEPHONE (OUTPATIENT)
Dept: ENDOCRINOLOGY | Facility: CLINIC | Age: 52
End: 2022-02-28
Payer: COMMERCIAL

## 2022-02-28 ENCOUNTER — MEDICAL CORRESPONDENCE (OUTPATIENT)
Dept: HEALTH INFORMATION MANAGEMENT | Facility: CLINIC | Age: 52
End: 2022-02-28
Payer: COMMERCIAL

## 2022-02-28 NOTE — TELEPHONE ENCOUNTER
Out side records from [facility]  [address]  [place] [address]   [phone]  Fax [phone]    Scani into pt chart after provider signs sent to Rashmi Obrien

## 2022-03-01 ENCOUNTER — TRANSFERRED RECORDS (OUTPATIENT)
Dept: HEALTH INFORMATION MANAGEMENT | Facility: CLINIC | Age: 52
End: 2022-03-01

## 2022-03-01 ENCOUNTER — TELEPHONE (OUTPATIENT)
Dept: ENDOCRINOLOGY | Facility: CLINIC | Age: 52
End: 2022-03-01

## 2022-03-01 ENCOUNTER — OFFICE VISIT (OUTPATIENT)
Dept: ENDOCRINOLOGY | Facility: CLINIC | Age: 52
End: 2022-03-01
Payer: COMMERCIAL

## 2022-03-01 VITALS
WEIGHT: 232.7 LBS | HEART RATE: 95 BPM | BODY MASS INDEX: 41.23 KG/M2 | HEIGHT: 63 IN | SYSTOLIC BLOOD PRESSURE: 146 MMHG | OXYGEN SATURATION: 98 % | DIASTOLIC BLOOD PRESSURE: 76 MMHG

## 2022-03-01 DIAGNOSIS — E66.813 CLASS 3 SEVERE OBESITY DUE TO EXCESS CALORIES WITH SERIOUS COMORBIDITY AND BODY MASS INDEX (BMI) OF 40.0 TO 44.9 IN ADULT (H): Primary | ICD-10-CM

## 2022-03-01 DIAGNOSIS — E66.01 MORBID OBESITY WITH BMI OF 40.0-44.9, ADULT (H): ICD-10-CM

## 2022-03-01 DIAGNOSIS — E66.01 CLASS 3 SEVERE OBESITY DUE TO EXCESS CALORIES WITH SERIOUS COMORBIDITY AND BODY MASS INDEX (BMI) OF 40.0 TO 44.9 IN ADULT (H): Primary | ICD-10-CM

## 2022-03-01 LAB — RETINOPATHY: NORMAL

## 2022-03-01 PROCEDURE — 91200 LIVER ELASTOGRAPHY: CPT | Mod: 26 | Performed by: NURSE PRACTITIONER

## 2022-03-01 PROCEDURE — 99214 OFFICE O/P EST MOD 30 MIN: CPT | Mod: 25 | Performed by: NURSE PRACTITIONER

## 2022-03-01 ASSESSMENT — PAIN SCALES - GENERAL: PAINLEVEL: NO PAIN (0)

## 2022-03-01 NOTE — LETTER
3/1/2022       RE: Swetha Patterson  [id number]  [facility] [address]     Dear Colleague,    Thank you for referring your patient, Swetha Patterson, to the [facility]. Please see a copy of my visit note below.      Pre-Bariatric Surgery Note    Roro Chawla    Date: 3/1/2022     RE: Swetha Patterson    MR#: [id number]   : 1970   Date of Visit: Mar 1, 2022    REASON FOR VISIT: Preoperative evaluation for possible weight loss surgery    Dear Roro Oliveira,    I had the pleasure of seeing your patient, Swetha Patterson, in my preoperative bariatric clinic.    As you know, she is morbidly obese and considering weight loss surgery to treat obesity in association with her medical conditions of obesity.  Her consult weight was 247.  She has lost 17 pounds since her consult weight. She has met her required pre-surgery weight. Please refer to initial consult note from date 2021 for patient's weight history and co-morbidities.    Assessment & Plan   Problem List Items Addressed This Visit        Digestive    Morbid obesity with BMI of 40.0-44.9, adult (H)     Here for fibroscan. Doing well with presurgery checklist. Down 17lb since consult in January (>2lb a week) with diet and lifestyle changes. She expresses concerns from aid at home that she is losing too quickly. I do not think this is the case at this time. Hunger and cravings are well controlled.     Blood pressure initially up today due to anxiety about fibroscan.     Plan:  Continue with tasks  Follow up with Rashmi Del Rio PA-C            Other Visit Diagnoses     Class 3 severe obesity due to excess calories with serious comorbidity and body mass index (BMI) of 40.0 to 44.9 in adult (H)    -  Primary    Relevant Orders    Fibrosis Scan (Completed)    MI LIVER ELASTOGRAPHY (Completed)           30 minutes spent  "on the date of the encounter doing chart review, history and exam, documentation and further activities per the note  [id number]}      Reviewed tasklist with patient     Most recent weights:  Wt Readings from Last 4 Encounters:   03/02/22 105.2 kg (232 lb)   03/01/22 105.6 kg (232 lb 11.2 oz)   02/18/22 104.3 kg (230 lb)   02/09/22 109.8 kg (242 lb)         ROS    Past Medical History:   Diagnosis Date     Anxiety state, unspecified      Asthma      Chronic pain     back pain from cyst     Contact dermatitis and other eczema, due to unspecified cause      COPD (chronic obstructive pulmonary disease) (H)      Depressive disorder, not elsewhere classified      Diabetes (H)      Emphysema with chronic bronchitis (H)      Esophageal reflux      Family history of ischemic heart disease      Fibromyalgia      Gastro-oesophageal reflux disease      Heart disease     has chest pain sometimes     History of emphysema      Hoarseness      HTN, goal below 140/90      Hyperlipidemia LDL goal <130      Liver disease     \"fatty liver\"     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)      PONV (postoperative nausea and vomiting)      Rectal bleeding        Past Surgical History:   Procedure Laterality Date     ANESTHESIA OUT OF OR MRI N/A 10/7/2021    Procedure: ANESTHESIA OUT OF OR MRI;  Surgeon: GENERIC ANESTHESIA PROVIDER;  Location: [facility] OR     ARTHROSCOPY SHOULDER DECOMPRESSION Left 10/21/2015    Procedure: ARTHROSCOPY SHOULDER DECOMPRESSION;  Surgeon: Julien Milian MD;  Location: [facility] OR     BIOPSY ARTERY TEMPORAL Left 3/11/2020    Procedure: LEFT TEMPORAL ARTERY BIOPSY;  Surgeon: Ibeth Conner MD;  Location: [facility] OR     BRONCHIAL THERMOPLASTY N/A 11/14/2014    Procedure: BRONCHIAL THERMOPLASTY;  Surgeon: Ward Whitaker MD;  Location: [facility]     BRONCHIAL THERMOPLASTY N/A 12/19/2014    Procedure: BRONCHIAL THERMOPLASTY;  Surgeon: Ward Whitaker MD;  [facility]: [facility]     BRONCHIAL THERMOPLASTY N/A 2/6/2015    " Procedure: BRONCHIAL THERMOPLASTY;  Surgeon: Ward Whitaker MD;  Location: [facility]     COLONOSCOPY N/A 11/26/2018    Procedure: COLONOSCOPY ([facility]);  Surgeon: Marshall Oakes MD;  Location:  OR     COLONOSCOPY N/A 10/22/2020    Procedure: Colonoscopy;  Surgeon: Marshall Mccormick MD;  Location: [facility] OR     DISCECTOMY, FUSION CERVICAL ANTERIOR ONE LEVEL, COMBINED N/A 5/1/2018    Procedure: COMBINED DISCECTOMY, FUSION CERVICAL ANTERIOR ONE LEVEL;  1.  C5-C6 anterior cervical diskectomy and fusion.    2.  C5-C6 application of intervertebral biomechanical device for interbody fusion purposes.    3.  C5-C6 anterior instrumentation using the standard Kristin InViZia 24 mm plate with four associated bone screws, 12 mm in length.  Inferior screws are fixed.  Superior screws are va     ENT SURGERY  2015    polyps removed from vocal cords      ESOPHAGOSCOPY, GASTROSCOPY, DUODENOSCOPY (EGD), COMBINED N/A 10/22/2020    Procedure: Esophagoscopy, gastroscopy, duodenoscopy with biopsies;  Surgeon: Marshall Mccormick MD;  Location:  OR     ESOPHAGOSCOPY, GASTROSCOPY, DUODENOSCOPY (EGD), COMBINED N/A 6/17/2021    Procedure: ESOPHAGOGASTRODUODENOSCOPY, WITH BIOPSY;  Surgeon: Darrel Damon MD;  Location:  GI     EXCISE MASS TRUNK Left 11/3/2021    Procedure: EXCISION LEFT SHOULDER LIPOMA;  Surgeon: Ibeth Conner MD;  Location:  OR     EXCISE NODE MEDIASTINAL  4/26/2013    Procedure: EXCISE NODE MEDIASTINAL;;  Surgeon: Av Peña MD;  Location:  OR     LAPAROSCOPIC CHOLECYSTECTOMY N/A 10/19/2017    Procedure: LAPAROSCOPIC CHOLECYSTECTOMY;  LAPAROSCOPIC CHOLECYSTECTOMY;  Surgeon: Ney Jerry MD;  Location:  OR     THORACOSCOPY  4/26/2013    Procedure: THORACOSCOPY;  LEFT VIDEO ASSISTED THORACOSCOPY, RESECTION OF POSTERIOR MEDIASTINAL MASS;  Surgeon: Av Peña MD;  Location:  OR     TONSILLECTOMY  as a kid     TONSILLECTOMY       ZZC APPENDECTOMY  at age 17       Current  Outpatient Medications   Medication     albuterol (PROVENTIL) (2.5 MG/3ML) 0.083% neb solution     albuterol (VENTOLIN HFA) 108 (90 Base) MCG/ACT inhaler     amLODIPine (NORVASC) 2.5 MG tablet     atorvastatin (LIPITOR) 80 MG tablet     benzoyl peroxide (ACNE-CLEAR) 10 % external gel     blood glucose (ACCU-CHEK ALLYSON PLUS) test strip     blood glucose (NO BRAND SPECIFIED) lancets standard     blood glucose (NO BRAND SPECIFIED) lancets standard     blood glucose (NO BRAND SPECIFIED) test strip     blood glucose monitoring (NO BRAND SPECIFIED) meter device kit     blood glucose monitoring (NO BRAND SPECIFIED) meter device kit     budesonide-formoterol (SYMBICORT) 160-4.5 MCG/ACT Inhaler     buPROPion (WELLBUTRIN XL) 150 MG 24 hr tablet     cetirizine (ZYRTEC) 10 MG tablet     cetirizine HCl 10 MG CAPS     clindamycin 1 % EX external gel     clonazePAM (KLONOPIN) 0.5 MG tablet     clonazePAM (KLONOPIN) 1 MG tablet     fluticasone (FLONASE) 50 MCG/ACT nasal spray     furosemide (LASIX) 20 MG tablet     hydrocortisone 2.5 % cream     hydrOXYzine (ATARAX) 50 MG tablet     lamoTRIgine (LAMICTAL) 150 MG tablet     lisinopril (ZESTRIL) 20 MG tablet     metFORMIN (GLUCOPHAGE) 500 MG tablet     montelukast (SINGULAIR) 10 MG tablet     multivitamin, therapeutic (THERA-VIT) TABS tablet     nicotine (NICODERM CQ) 14 MG/24HR 24 hr patch     nicotine (NICORETTE) 2 MG gum     nitroGLYcerin (NITROSTAT) 0.4 MG sublingual tablet     nystatin (MYCOSTATIN) 406146 UNIT/GM external powder     omeprazole (PRILOSEC) 20 MG DR capsule     ondansetron (ZOFRAN) 8 MG tablet     ondansetron (ZOFRAN-ODT) 4 MG ODT tab     polyethylene glycol (MIRALAX) 17 GM/Dose powder     sulfamethoxazole-trimethoprim (BACTRIM DS) 800-160 MG tablet     terbinafine (LAMISIL) 1 % external cream     Vitamin D3 (CHOLECALCIFEROL) 125 MCG (5000 UT) tablet     nystatin (MYCOSTATIN) 081444 UNIT/ML suspension     No current facility-administered medications for this visit.        Allergies   Allergen Reactions     Codeine Nausea and Vomiting     vomiting     Cyclobenzaprine Nausea     Gabapentin Rash     Hydrocodone Nausea and Vomiting     Sulfa Drugs Nausea and Vomiting     Nausea       Lab on 01/24/2022   Component Date Value Ref Range Status     WBC Count 01/24/2022 6.7  4.0 - 11.0 10e3/uL Final     RBC Count 01/24/2022 5.12  3.80 - 5.20 10e6/uL Final     Hemoglobin 01/24/2022 13.9  11.7 - 15.7 g/dL Final     Hematocrit 01/24/2022 44.5  35.0 - 47.0 % Final     MCV 01/24/2022 87  78 - 100 fL Final     MCH 01/24/2022 27.1  26.5 - 33.0 pg Final     MCHC 01/24/2022 31.2 (A) 31.5 - 36.5 g/dL Final     RDW 01/24/2022 16.2 (A) 10.0 - 15.0 % Final     Platelet Count 01/24/2022 379  150 - 450 10e3/uL Final     Sodium 01/24/2022 135  133 - 144 mmol/L Final     Potassium 01/24/2022 4.0  3.4 - 5.3 mmol/L Final     Chloride 01/24/2022 104  94 - 109 mmol/L Final     Carbon Dioxide (CO2) 01/24/2022 26  20 - 32 mmol/L Final     Anion Gap 01/24/2022 5  3 - 14 mmol/L Final     Urea Nitrogen 01/24/2022 16  7 - 30 mg/dL Final     Creatinine 01/24/2022 0.85  0.52 - 1.04 mg/dL Final     Calcium 01/24/2022 9.6  8.5 - 10.1 mg/dL Final     Glucose 01/24/2022 126 (A) 70 - 99 mg/dL Final     Alkaline Phosphatase 01/24/2022 156 (A) 40 - 150 U/L Final     AST 01/24/2022 47 (A) 0 - 45 U/L Final     ALT 01/24/2022 89 (A) 0 - 50 U/L Final     Protein Total 01/24/2022 7.7  6.8 - 8.8 g/dL Final     Albumin 01/24/2022 3.6  3.4 - 5.0 g/dL Final     Bilirubin Total 01/24/2022 0.4  0.2 - 1.3 mg/dL Final     GFR Estimate 01/24/2022 82  >60 mL/min/1.73m2 Final    Effective December 21, 2021 eGFRcr in adults is calculated using the 2021 CKD-EPI creatinine equation which includes age and gender (Tanesha et al., NEJM, DOI: 10.1056/TEDFdz5339223)     Hemoglobin A1C 01/24/2022 5.8 (A) 0.0 - 5.6 % Final    Normal <5.7%   Prediabetes 5.7-6.4%    Diabetes 6.5% or higher     Note: Adopted from ADA consensus guidelines.     Vitamin  "D, Total (25-Hydroxy) 01/24/2022 54  20 - 75 ug/L Final     Parathyroid Hormone Intact 01/24/2022 23  pg/mL Final    : 8.7-79.6 pg/mL  : 12-64 pg/mL  Other races - Normal range is not specified     Cholesterol 01/24/2022 167  <200 mg/dL Final     Triglycerides 01/24/2022 227 (A) <150 mg/dL Final     Direct Measure HDL 01/24/2022 42 (A) >=50 mg/dL Final     LDL Cholesterol Calculated 01/24/2022 80  <=100 mg/dL Final     Non HDL Cholesterol 01/24/2022 125  <130 mg/dL Final     Patient Fasting > 8hrs? 01/24/2022 Yes   Final       PHYSICAL EXAM:  Objective    BP (!) 146/76 (BP Location: Left arm, Patient Position: Chair, Cuff Size: Adult Large)   Pulse 95   Ht 1.6 m (5' 3\")   Wt 105.6 kg (232 lb 11.2 oz)   LMP 04/15/2016 (Exact Date)   SpO2 98%   BMI 41.22 kg/m    BP (!) 146/76 (BP Location: Left arm, Patient Position: Chair, Cuff Size: Adult Large)   Pulse 95   Ht 1.6 m (5' 3\")   Wt 105.6 kg (232 lb 11.2 oz)   LMP 04/15/2016 (Exact Date)   SpO2 98%   BMI 41.22 kg/m    Body mass index is 41.22 kg/m .  Physical Exam   GENERAL: Healthy, alert and no distress  EYES: Eyes grossly normal to inspection.  No discharge or erythema, or obvious scleral/conjunctival abnormalities.  RESP: No audible wheeze, cough, or visible cyanosis.  No visible retractions or increased work of breathing.    SKIN: Visible skin clear. No significant rash, abnormal pigmentation or lesions.  NEURO: Cranial nerves grossly intact.  Mentation and speech appropriate for age.  PSYCH: Mentation appears normal, affect normal/bright, judgement and insight intact, normal speech and appearance well-groomed.         I emphasized exercise and activity behavior along with appropriate food choice as the main foundation for weight loss with surgery providing surgical reinforcement of the appropriate behavior set.        EXAMINATION:    Liver FibroScan    DATE OF EXAM:  2/22/22    INDICATION:    Liver fibrosis assessment    HISTORY:  "   Morbid obesity with hx of alcohol abuse >10 years ago. Quit drinking in 2010 with 1 relapse 2 years ago.     A series of at least 10 Vibration Controlled Transient Elastography (VCTETM) measurements was performed by  placing the probe XL (M, XL) over the center of the liver parenchyma and mechanically inducing a 50 Hertz  shear wave.    Each resulting VCTETM measurement was analyzed to determine shear wave propagation speed and  calculate the equivalent liver stiffness.    All measurements were reviewed by the  and physician fortechnical accuracy. Data variability across the acquired measurements was quantified with IQR/Median Percentage.    FINDINGS:    The median liver stiffness was 4.8 kPa with IQR/Median percentage of 11 %.    The measure CAP ultrasound attenuation rate value was 312 dB/m.      IMPRESSION:    1. Estimated liver fibrosis is Stage 0 F0-F4  2.   Steatosis Grade S3       The results of the FibroScan examination were assessed by taking into account the quality of the measurement thumbnails, number of measurements, and IQR/Median ratio. I have personally reviewed the examination and initial interpretation, and I agree with the findings.        Fib4 calculator .68                Review of general surgery weight loss process    1. Complete preoperative requirements, including weight loss.  Final weight check to confirm MANDATORY weight loss requirement must be documented on a clinic scale.    2. Discuss prior authorization with .    3. History and physical evaluation by PCP of PAC clinic within 30 days of surgery date, preoperative class, and weight check (weigh-in visit) to be scheduled by patient.  Pre-anesthesia clinic for risk evaluation to be scheduled by anesthesia clinic.    4. We cannot guarantee that patient will qualify for surgery unless all preoperative requirements are met, prior authorization from primary insurance company is granted, and insurance changes do  not occur.    5. It is possible for patients to regain all weight after weight loss surgery unless they follow guidelines prescribed by our bariatric center.    6. All patients with gastrointestinal complaints after weight loss surgery must have complaints conveyed to the bariatric team for appropriate treatment.    7. Vitamin deficiencies may develop post-bariatric surgery and annual laboratory testing should be performed.    8. Persistent nausea/vomiting after bariatric surgery entails risk of thiamine deficiency and should be treated early.  Vitamin B12 deficiency may develop, especially after gastric bypass surgery and must be recognized.        If you have any questions about our plans please don't hesitate to contact me.    Sincerely,    Thuy Mobley NP            Retaken bp 120/68

## 2022-03-01 NOTE — TELEPHONE ENCOUNTER
Called and spoke with patient in regards to prep for fibroscan. Advised nothing to eat or drink 3 hours prior. Patient states she hasn't drank or eaten since 9-10am. No further questions.

## 2022-03-01 NOTE — PATIENT INSTRUCTIONS
"Thank you for allowing us the privilege of caring for you. We hope we provided you with the excellent service you deserve.   Please let us know if there is anything else we can do for you so that we can be sure you are completely satisfied with your care experience.    To ensure the quality of our services you may be receiving a patient satisfaction survey from an independent patient satisfaction monitoring company.    The greatest compliment you can give is a \"Likely to Recommend\"    Your visit was with Thuy Mobley NP today.    Instructions per today's visit:     Ramone Patterson, it was great to visit with you today.  Here is a review of our visit.  If our clinic scheduler is not able to reach you please call [phone] to schedule your next appointments.    -keep up the great work!   -Follow up with Rika 3/18/2022  -Follow up with Rashmi Del Rio PA-C 4/2022      Information about Video Visits with [facility]: video visit information  _________________________________________________________________________________________________________________________________________________________  If you are asked by your clinic team to have your blood pressure checked:  [facility] do offer several locations for blood pressure checks. Please follow the below link to schedule an appointment. Scheduling an appointment at the pharmacy for a blood pressure check is now preferred.    Appointment Plus (appointment-plus.[employer])  _________________________________________________________________________________________________________________________________________________________  Important contact and scheduling information:  Please call our contact center at [phone] to schedule your next appointments.  To find a lab location near you, please call [phone].  For any nursing questions or concerns call Sally Lynch LPN at [phone] or Celia Loza RN at [phone]  Please call during clinic hours " Monday through Friday 8:00a - 4:00p if you have questions or you can contact us via [employer] at anytime and we will reply during clinic hours.    Lab results will be communicated through My Chart or letter (if My Chart not used). Please call the clinic if you have not received communication after 1 week or if you have any questions.?  Clinic Fax: [phone]    _________________________________________________________________________________________________________________________________________________________  Meal Replacement Products:    Here is the link to our new e-store where you can purchase our meal replacement products    [employer] E-Store  [employer]/store    The one week starter kit is a great way to sample a variety of products and see what works for you.    If you want more information about the product go to: [employer]  [employer]    If you are an employee or [facility] or  [employer] please contact your care team for a 10% estore discount    Free Shipping for orders over $75     Benefits of meal replacements products:    Portion and calorie control  Improved nutrition  Structured eating  Simplified food choices  Avoid contact with trigger foods  _________________________________________________________________________________________________________________________________________________________  Interested in working with a health ?  Health coaches work with you to improve your overall health and wellbeing.  They look at the whole person, and may involve discussion of different areas of life, including, but not limited to the four pillars of health (sleep, exercise, nutrition, and stress management). Discuss with your care team if you would like to start working a health .  Health Coaching-3 Pack: Schedule by calling [phone]    $99 for three health coaching visits    Visits may be done in person or via phone    Coaching is a  partnership between the  and the client; Coaches do not prescribe or diagnose    Coaching helps inspire the client to reach his/her personal goals   _________________________________________________________________________________________________________________________________________________________  24 Week Healthy Lifestyle Plan:    Our mission in the 24-week Healthy Lifestyle Plan is to provide you with individualized care by giving you the tools, education and support you need to lose weight and maintain a healthy lifestyle. In your 24-week journey, you ll be supported by a dedicated weight loss team that includes registered dietitians, medical weight management providers, health coaches, and nurses -- all with special expertise in weight loss -- to help you every step of the way.     Monthly meetings with your registered dietician or medical weight management provider help to review your progress, update your care plan, and make any adjustments needed to ensure success. Between these visits, weekly and bi-weekly health  visits will help you focus on the four pillars of weight loss -- stress, sleep, nutrition, and exercise -- and how you can best adapt each to achieve sustainable weight loss results.    In addition, you will be given exclusive access to online wellbeing classes through [employer].  Your initial visit will be with a medical weight management provider who will help to understand your weight loss goals and ensure this program is the right fit for you. Please let our team know if you are interested in the 24 week plan by sending a message to your care team or calling [phone] to schedule.  _________________________________________________________________________________________________________________________________________________________    COMPREHENSIVE WEIGHT MANAGEMENT PROGRAM  VIRTUAL SUPPORT GROUPS    For Support Group Information:      We offer support groups for patients who  "are working on weight loss and considering, preparing for or have had weight loss surgery.   There is no cost for this opportunity.  You are invited to attend the?Virtual Support Groups?provided by any of the following locations:    1. [facility] via Microsoft Teams with Vicky Rutledge RN  2.   [place] via [employer] with Darrel Cerda, PhD, LP  3.   [place] via [employer] with Janette Pacheco RN  4.   [facility] via Microsoft Teams with Janette Benitez [facility]    The following Support Group information can also be found on our website:  [email or web address]    [facility] Weight Loss Surgery Support Group    [facility] Weight Loss Surgery Support Group  The support group is a patient-lead forum that meets monthly to share experiences, encouragement and education. It is open to those who have had weight loss surgery, are scheduled for surgery, and those who are considering surgery.   WHEN: This group meets on the 3rd Wednesday of each month from 5:00PM - 6:00PM virtually using Microsoft Teams.   FACILITATOR: Led by Vicky Rutledge RD, LD, RN, the program's Clinical Coordinator.   TO REGISTER: Please contact the clinic via [employer] or call the nurse line directly at [phone] to inform our staff that you would like an invite sent to you and to let us know the email you would like the invite sent to. Prior to the meeting, a link with directions on how to join the meeting will be sent to you.    2022 Meetings  January 19: \"Let's Talk\" a time for the group to share.  February 16: \"Let's Talk\" a time for the group to share.  March 16: Guest Speakers: Psychologists, Brenda Blakely, PhD,LP and Kerry Poe PsMar,LP  April 20: Guest Speaker: Health , Carmina Mccarthy, [facility], CPT  May 18: Guest Speaker: Dietitian, Nj Alexander, OCTAVIO, LP  Lakeshia 15: \"Let's Talk\" a time for the group to share.  July 20: \"Let's Talk\" a time for the " "group to share.  August 17: TBA  September 21: TBA  October 19: Guest Speaker: Dr Haroon Bah MD Pulmonologist and Sleep Medicine Physician, \"Getting a Good Night's Sleep\".  November 16: TBA  December 21: TBA    [facility] Support Groups    Connections: Bariatric Care Support Group?  This is open to all [facility] (and those external to this program) pre- and post- operative bariatric surgery patients as well as their support system.   WHEN: This group meets the 2nd Tuesday of each month from 6:30 PM - 8:00 PM virtually using Microsoft Teams.   FACILITATOR: Led by Darrel Cerda, Ph.D who is a Licensed Psychologist with the [facility] Comprehensive Weight Management Program.   TO REGISTER: Please send an email to Darrel Cerda, Ph.D., LP at?[email or web address]?if you would like an invitation to the group and to learn about using Microsoft Teams.    2022 Meetings  January 11: Sarah Rader, PharmD, Pharmacy Resident at [facility], \"Medications and Bariatric Surgery\".  February 8: Open Forum  March 8: Alen Goyal MD, \"Exercise and Bariatric Surgery\".  April 12  May 10  Lakeshia 14    Connections: Post-Operative Bariatric Surgery Support Group  This is a support group for [facility] bariatric patients (and those external to [facility]) who have had bariatric surgery and are at least 3 months post-surgery.  WHEN: This support group meets the 4th Wednesday of the month from 11:00 AM - 12:00 PM virtually using Microsoft Teams.   FACILITATOR: Led by Certified Bariatric Nurse, Janette Pacheco RN.   TO REGISTER: Please send an email to Janette at [email or web address] if you would like an invitation to the group and to learn about using Microsoft Teams.    2022 Meetings  January 26  February 23  March 23  April 27  May 25  Lakeshia 22    [facility] Healthy Lifestyle Virtual Support Group    Healthy Lifestyle " "Virtual Support Group?  This is 60 minutes of small group guided discussion, support and resources. All are welcome who want a healthy lifestyle.  WHEN: This group meets monthly on a Friday from 12:30 PM - 1:30 PM virtually using Microsoft Teams.   FACILITATOR: Led by National Board Certified Health and , Janette Benitez [facility].   TO REGISTER: Please send an email to Janette [email or web address] to receive monthly invites to the group or if you have any questions about having a health .  Prior to the meeting, a link with directions on how to join the meeting will be sent to you.    2022 Meetings  January 21: Shakila Hernandez MS, RN, CIC, CBN, \"Healthy Habits\"  February 25: Open Forum  March 18: \"Setting Limits and Boundaries\"  April 29: Rika Mejía RD, \"Meal Planning Made Easy\"  May 20: Open Forum  Lakeshia: To be determined  ____________________________________________________________________________________________________________________________________________________________________________  [facility] Get Moving Program  Our team of physical therapists is trained to help you understand and take control of your condition. They will perform a thorough evaluation to determine your ability for activity and develop a customized plan to fit your goals and physical ability.  Scheduling: Unsure if the Get Moving program is right for you? Discuss the program with your medical provider or diabetes educator. You can also call us at [phone] to ask questions or schedule an appointment.   [facility] Get Moving Program  ____________________________________________________________________________________________________________________________________________________________________________  [facility] Diabetes Prevention Program (DPP)  If you have prediabetes and Medicare please contact us via MyChart to learn more about the Diabetes Prevention Program (DPP)  Program Details:  [facility] offers the year-long Diabetes Prevention Program (DPP). The program helps you to make lifestyle changes that prevent or delay type 2 diabetes by supporting healthy eating, increased physical activity, stress reduction and use of coping skills.   On average, previous [facility] DPP cohorts have lost and maintained at least 5% of their starting weight throughout the program and averaged more than 150 minutes of physical activity per week.  Participants meet weekly for one-hour group sessions over sixteen weeks, every other week for the next 8 weeks, and monthly for the last six months.   A year-long maintenance program is also available for participants who complete the first year.   Location & Cost:   During the COVID-19 Public Health Emergency, the program is offered virtually. When in-person classes can resume, they will be held at [facility].  For people with Medicare, the program is covered in full. A self-pay option will also be available for those with non-Medicare insurance plans.   _________________________________________________________________________________________________________________________________________________________  Bluetooth Scale:    We hope to provide you with high quality virtual healthcare visits while social distancing for COVID-19 is necessary, as well as in the future when virtual visits may be more convenient for you.     Our technology team made it possible for Bluetooth scales to send weight measurements to our electronic medical record. This allows weights from you weighing at home to securely flow into the medical record, which will improve telephone and virtual visits.   Additionally, studies have shown that adults actually lose more weight when their weights are automatically sent to someone else, and also that this process is not stressful for those adults.    Below is a link for purchasing the scale, with a discount  code for our patients. You may call your insurance company to see if they will reimburse you for the cost of the scale, as a piece of durable medical equipment. The scales only go up to a weight of 400 pounds. This is an issue and we are working with the developer on increasing this. We found no scales that go over 400lb that have blue-tooth for connecting to [employer].    Scale to purchase: the EUROBOX  Body  Scale: [email or web address]/[email or web address]=aw.ds    Discount Code: We have a discount code for our patients to bring the cost down to $50, Discount code is: [place]_Scale_20%off      Thank you,   [facility] Comprehensive Weight Management Team

## 2022-03-01 NOTE — TELEPHONE ENCOUNTER
Reason for Call:  Other call back    Detailed comments: Patient has an appointment today and is wondering if she can eat or drink anything before she comes in for her appointment at 1. Please give patient a call back and advise.     Phone Number Patient can be reached at: Cell number on file:    Telephone Information:   Mobile [phone]       Best Time: Anytime     Can we leave a detailed message on this number? YES    Call taken on 3/1/2022 at 11:26 AM by Divya Thurston

## 2022-03-01 NOTE — NURSING NOTE
"Chief Complaint   Patient presents with     RECHECK     Follow-up Laparscopic Gastric Sleeve Surgery       Vitals:    03/01/22 1301   BP: (!) 146/76   BP Location: Left arm   Patient Position: Chair   Cuff Size: Adult Large   Pulse: 95   SpO2: 98%   Weight: 105.6 kg (232 lb 11.2 oz)   Height: 1.6 m (5' 3\")       Body mass index is 41.22 kg/m .                        "

## 2022-03-01 NOTE — PROGRESS NOTES
Pre-Bariatric Surgery Note    Roro Chawla    Date: 3/1/2022     RE: Swetha Patterson    MR#: [id number]   : 1970   Date of Visit: Mar 1, 2022    REASON FOR VISIT: Preoperative evaluation for possible weight loss surgery    Dear Yenni Oliveiralisha De LeonJanine,    I had the pleasure of seeing your patient, Swetha Patterson, in my preoperative bariatric clinic.    As you know, she is morbidly obese and considering weight loss surgery to treat obesity in association with her medical conditions of obesity.  Her consult weight was 247.  She has lost 17 pounds since her consult weight. She has met her required pre-surgery weight. Please refer to initial consult note from date 2021 for patient's weight history and co-morbidities.    Assessment & Plan   Problem List Items Addressed This Visit        Digestive    Morbid obesity with BMI of 40.0-44.9, adult (H)     Here for fibroscan. Doing well with presurgery checklist. Down 17lb since consult in January (>2lb a week) with diet and lifestyle changes. She expresses concerns from aid at home that she is losing too quickly. I do not think this is the case at this time. Hunger and cravings are well controlled.     Blood pressure initially up today due to anxiety about fibroscan.     Plan:  Continue with tasks  Follow up with Rashmi Del Rio PA-C            Other Visit Diagnoses     Class 3 severe obesity due to excess calories with serious comorbidity and body mass index (BMI) of 40.0 to 44.9 in adult (H)    -  Primary    Relevant Orders    Fibrosis Scan (Completed)    WA LIVER ELASTOGRAPHY (Completed)           30 minutes spent on the date of the encounter doing chart review, history and exam, documentation and further activities per the note  [id number]}      Reviewed tasklist with patient     Most recent weights:  Wt Readings from Last 4 Encounters:   22 105.2 kg (232 lb)   22 105.6 kg (232 lb 11.2 oz)   22 104.3 kg (230 lb)  "  02/09/22 109.8 kg (242 lb)         ROS    Past Medical History:   Diagnosis Date     Anxiety state, unspecified      Asthma      Chronic pain     back pain from cyst     Contact dermatitis and other eczema, due to unspecified cause      COPD (chronic obstructive pulmonary disease) (H)      Depressive disorder, not elsewhere classified      Diabetes (H)      Emphysema with chronic bronchitis (H)      Esophageal reflux      Family history of ischemic heart disease      Fibromyalgia      Gastro-oesophageal reflux disease      Heart disease     has chest pain sometimes     History of emphysema      Hoarseness      HTN, goal below 140/90      Hyperlipidemia LDL goal <130      Liver disease     \"fatty liver\"     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)      PONV (postoperative nausea and vomiting)      Rectal bleeding        Past Surgical History:   Procedure Laterality Date     ANESTHESIA OUT OF OR MRI N/A 10/7/2021    Procedure: ANESTHESIA OUT OF OR MRI;  Surgeon: GENERIC ANESTHESIA PROVIDER;  Location: [facility]     ARTHROSCOPY SHOULDER DECOMPRESSION Left 10/21/2015    Procedure: ARTHROSCOPY SHOULDER DECOMPRESSION;  Surgeon: Julien Milian MD;  Location: [facility] OR     BIOPSY ARTERY TEMPORAL Left 3/11/2020    Procedure: LEFT TEMPORAL ARTERY BIOPSY;  Surgeon: Ibeth Conner MD;  Location: [facility]     BRONCHIAL THERMOPLASTY N/A 11/14/2014    Procedure: BRONCHIAL THERMOPLASTY;  Surgeon: Ward Whitaker MD;  Location: [facility]     BRONCHIAL THERMOPLASTY N/A 12/19/2014    Procedure: BRONCHIAL THERMOPLASTY;  Surgeon: Ward Whitaker MD;  Location: [facility]     BRONCHIAL THERMOPLASTY N/A 2/6/2015    Procedure: BRONCHIAL THERMOPLASTY;  Surgeon: Ward Whitaker MD;  Location: [facility]     COLONOSCOPY N/A 11/26/2018    Procedure: COLONOSCOPY ([facility]);  Surgeon: Marshall Oakes MD;  Location: [facility]     COLONOSCOPY N/A 10/22/2020    Procedure: Colonoscopy;  Surgeon: Marshall Mccormick MD;  Location:  OR     " DISCECTOMY, FUSION CERVICAL ANTERIOR ONE LEVEL, COMBINED N/A 5/1/2018    Procedure: COMBINED DISCECTOMY, FUSION CERVICAL ANTERIOR ONE LEVEL;  1.  C5-C6 anterior cervical diskectomy and fusion.    2.  C5-C6 application of intervertebral biomechanical device for interbody fusion purposes.    3.  C5-C6 anterior instrumentation using the standard Kristin InViZia 24 mm plate with four associated bone screws, 12 mm in length.  Inferior screws are fixed.  Superior screws are va     ENT SURGERY  2015    polyps removed from vocal cords      ESOPHAGOSCOPY, GASTROSCOPY, DUODENOSCOPY (EGD), COMBINED N/A 10/22/2020    Procedure: Esophagoscopy, gastroscopy, duodenoscopy with biopsies;  Surgeon: Marshall Mccormick MD;  Location:  OR     ESOPHAGOSCOPY, GASTROSCOPY, DUODENOSCOPY (EGD), COMBINED N/A 6/17/2021    Procedure: ESOPHAGOGASTRODUODENOSCOPY, WITH BIOPSY;  Surgeon: Darrel Damon MD;  Location:  GI     EXCISE MASS TRUNK Left 11/3/2021    Procedure: EXCISION LEFT SHOULDER LIPOMA;  Surgeon: Ibeth Conner MD;  Location:  OR     EXCISE NODE MEDIASTINAL  4/26/2013    Procedure: EXCISE NODE MEDIASTINAL;;  Surgeon: Av Peña MD;  Location:  OR     LAPAROSCOPIC CHOLECYSTECTOMY N/A 10/19/2017    Procedure: LAPAROSCOPIC CHOLECYSTECTOMY;  LAPAROSCOPIC CHOLECYSTECTOMY;  Surgeon: Ney Jerry MD;  Location:  OR     THORACOSCOPY  4/26/2013    Procedure: THORACOSCOPY;  LEFT VIDEO ASSISTED THORACOSCOPY, RESECTION OF POSTERIOR MEDIASTINAL MASS;  Surgeon: Av Peña MD;  Location:  OR     TONSILLECTOMY  as a kid     TONSILLECTOMY       ZZC APPENDECTOMY  at age 17       Current Outpatient Medications   Medication     albuterol (PROVENTIL) (2.5 MG/3ML) 0.083% neb solution     albuterol (VENTOLIN HFA) 108 (90 Base) MCG/ACT inhaler     amLODIPine (NORVASC) 2.5 MG tablet     atorvastatin (LIPITOR) 80 MG tablet     benzoyl peroxide (ACNE-CLEAR) 10 % external gel     blood glucose (ACCU-CHEK ALLYSON  PLUS) test strip     blood glucose (NO BRAND SPECIFIED) lancets standard     blood glucose (NO BRAND SPECIFIED) lancets standard     blood glucose (NO BRAND SPECIFIED) test strip     blood glucose monitoring (NO BRAND SPECIFIED) meter device kit     blood glucose monitoring (NO BRAND SPECIFIED) meter device kit     budesonide-formoterol (SYMBICORT) 160-4.5 MCG/ACT Inhaler     buPROPion (WELLBUTRIN XL) 150 MG 24 hr tablet     cetirizine (ZYRTEC) 10 MG tablet     cetirizine HCl 10 MG CAPS     clindamycin 1 % EX external gel     clonazePAM (KLONOPIN) 0.5 MG tablet     clonazePAM (KLONOPIN) 1 MG tablet     fluticasone (FLONASE) 50 MCG/ACT nasal spray     furosemide (LASIX) 20 MG tablet     hydrocortisone 2.5 % cream     hydrOXYzine (ATARAX) 50 MG tablet     lamoTRIgine (LAMICTAL) 150 MG tablet     lisinopril (ZESTRIL) 20 MG tablet     metFORMIN (GLUCOPHAGE) 500 MG tablet     montelukast (SINGULAIR) 10 MG tablet     multivitamin, therapeutic (THERA-VIT) TABS tablet     nicotine (NICODERM CQ) 14 MG/24HR 24 hr patch     nicotine (NICORETTE) 2 MG gum     nitroGLYcerin (NITROSTAT) 0.4 MG sublingual tablet     nystatin (MYCOSTATIN) 051201 UNIT/GM external powder     omeprazole (PRILOSEC) 20 MG DR capsule     ondansetron (ZOFRAN) 8 MG tablet     ondansetron (ZOFRAN-ODT) 4 MG ODT tab     polyethylene glycol (MIRALAX) 17 GM/Dose powder     sulfamethoxazole-trimethoprim (BACTRIM DS) 800-160 MG tablet     terbinafine (LAMISIL) 1 % external cream     Vitamin D3 (CHOLECALCIFEROL) 125 MCG (5000 UT) tablet     nystatin (MYCOSTATIN) 739498 UNIT/ML suspension     No current facility-administered medications for this visit.       Allergies   Allergen Reactions     Codeine Nausea and Vomiting     vomiting     Cyclobenzaprine Nausea     Gabapentin Rash     Hydrocodone Nausea and Vomiting     Sulfa Drugs Nausea and Vomiting     Nausea       Lab on 01/24/2022   Component Date Value Ref Range Status     WBC Count 01/24/2022 6.7  4.0 - 11.0  10e3/uL Final     RBC Count 01/24/2022 5.12  3.80 - 5.20 10e6/uL Final     Hemoglobin 01/24/2022 13.9  11.7 - 15.7 g/dL Final     Hematocrit 01/24/2022 44.5  35.0 - 47.0 % Final     MCV 01/24/2022 87  78 - 100 fL Final     MCH 01/24/2022 27.1  26.5 - 33.0 pg Final     MCHC 01/24/2022 31.2 (A) 31.5 - 36.5 g/dL Final     RDW 01/24/2022 16.2 (A) 10.0 - 15.0 % Final     Platelet Count 01/24/2022 379  150 - 450 10e3/uL Final     Sodium 01/24/2022 135  133 - 144 mmol/L Final     Potassium 01/24/2022 4.0  3.4 - 5.3 mmol/L Final     Chloride 01/24/2022 104  94 - 109 mmol/L Final     Carbon Dioxide (CO2) 01/24/2022 26  20 - 32 mmol/L Final     Anion Gap 01/24/2022 5  3 - 14 mmol/L Final     Urea Nitrogen 01/24/2022 16  7 - 30 mg/dL Final     Creatinine 01/24/2022 0.85  0.52 - 1.04 mg/dL Final     Calcium 01/24/2022 9.6  8.5 - 10.1 mg/dL Final     Glucose 01/24/2022 126 (A) 70 - 99 mg/dL Final     Alkaline Phosphatase 01/24/2022 156 (A) 40 - 150 U/L Final     AST 01/24/2022 47 (A) 0 - 45 U/L Final     ALT 01/24/2022 89 (A) 0 - 50 U/L Final     Protein Total 01/24/2022 7.7  6.8 - 8.8 g/dL Final     Albumin 01/24/2022 3.6  3.4 - 5.0 g/dL Final     Bilirubin Total 01/24/2022 0.4  0.2 - 1.3 mg/dL Final     GFR Estimate 01/24/2022 82  >60 mL/min/1.73m2 Final    Effective December 21, 2021 eGFRcr in adults is calculated using the 2021 CKD-EPI creatinine equation which includes age and gender (Tanesha et al., NEJM, DOI: 10.1056/SLDCzi6642829)     Hemoglobin A1C 01/24/2022 5.8 (A) 0.0 - 5.6 % Final    Normal <5.7%   Prediabetes 5.7-6.4%    Diabetes 6.5% or higher     Note: Adopted from ADA consensus guidelines.     Vitamin D, Total (25-Hydroxy) 01/24/2022 54  20 - 75 ug/L Final     Parathyroid Hormone Intact 01/24/2022 23  pg/mL Final    : 8.7-79.6 pg/mL  : 12-64 pg/mL  Other races - Normal range is not specified     Cholesterol 01/24/2022 167  <200 mg/dL Final     Triglycerides 01/24/2022 227 (A) <150 mg/dL Final      "Direct Measure HDL 01/24/2022 42 (A) >=50 mg/dL Final     LDL Cholesterol Calculated 01/24/2022 80  <=100 mg/dL Final     Non HDL Cholesterol 01/24/2022 125  <130 mg/dL Final     Patient Fasting > 8hrs? 01/24/2022 Yes   Final       PHYSICAL EXAM:  Objective    BP (!) 146/76 (BP Location: Left arm, Patient Position: Chair, Cuff Size: Adult Large)   Pulse 95   Ht 1.6 m (5' 3\")   Wt 105.6 kg (232 lb 11.2 oz)   LMP 04/15/2016 (Exact Date)   SpO2 98%   BMI 41.22 kg/m    BP (!) 146/76 (BP Location: Left arm, Patient Position: Chair, Cuff Size: Adult Large)   Pulse 95   Ht 1.6 m (5' 3\")   Wt 105.6 kg (232 lb 11.2 oz)   LMP 04/15/2016 (Exact Date)   SpO2 98%   BMI 41.22 kg/m    Body mass index is 41.22 kg/m .  Physical Exam   GENERAL: Healthy, alert and no distress  EYES: Eyes grossly normal to inspection.  No discharge or erythema, or obvious scleral/conjunctival abnormalities.  RESP: No audible wheeze, cough, or visible cyanosis.  No visible retractions or increased work of breathing.    SKIN: Visible skin clear. No significant rash, abnormal pigmentation or lesions.  NEURO: Cranial nerves grossly intact.  Mentation and speech appropriate for age.  PSYCH: Mentation appears normal, affect normal/bright, judgement and insight intact, normal speech and appearance well-groomed.         I emphasized exercise and activity behavior along with appropriate food choice as the main foundation for weight loss with surgery providing surgical reinforcement of the appropriate behavior set.        EXAMINATION:    Liver FibroScan    DATE OF EXAM:  3/1/22    INDICATION:    Liver fibrosis assessment    HISTORY:    Morbid obesity with hx of alcohol abuse >10 years ago. Quit drinking in 2010 with 1 relapse 2 years ago.     A series of at least 10 Vibration Controlled Transient Elastography (VCTETM) measurements was performed by  placing the probe XL (M, XL) over the center of the liver parenchyma and mechanically inducing a 50 " Hertz  shear wave.    Each resulting VCTETM measurement was analyzed to determine shear wave propagation speed and  calculate the equivalent liver stiffness.    All measurements were reviewed by the  and physician fortechnical accuracy. Data variability across the acquired measurements was quantified with IQR/Median Percentage.    FINDINGS:    The median liver stiffness was 4.8 kPa with IQR/Median percentage of 11 %.    The measure CAP ultrasound attenuation rate value was 312 dB/m.      IMPRESSION:    1. Estimated liver fibrosis is Stage 0 F0-F4  2.   Steatosis Grade S3       The results of the FibroScan examination were assessed by taking into account the quality of the measurement thumbnails, number of measurements, and IQR/Median ratio. I have personally reviewed the examination and initial interpretation, and I agree with the findings.        Fib4 calculator .68                Review of general surgery weight loss process    1. Complete preoperative requirements, including weight loss.  Final weight check to confirm MANDATORY weight loss requirement must be documented on a clinic scale.    2. Discuss prior authorization with .    3. History and physical evaluation by PCP of PAC clinic within 30 days of surgery date, preoperative class, and weight check (weigh-in visit) to be scheduled by patient.  Pre-anesthesia clinic for risk evaluation to be scheduled by anesthesia clinic.    4. We cannot guarantee that patient will qualify for surgery unless all preoperative requirements are met, prior authorization from primary insurance company is granted, and insurance changes do not occur.    5. It is possible for patients to regain all weight after weight loss surgery unless they follow guidelines prescribed by our bariatric center.    6. All patients with gastrointestinal complaints after weight loss surgery must have complaints conveyed to the bariatric team for appropriate  treatment.    7. Vitamin deficiencies may develop post-bariatric surgery and annual laboratory testing should be performed.    8. Persistent nausea/vomiting after bariatric surgery entails risk of thiamine deficiency and should be treated early.  Vitamin B12 deficiency may develop, especially after gastric bypass surgery and must be recognized.        If you have any questions about our plans please don't hesitate to contact me.    Sincerely,    Thuy Mobley NP

## 2022-03-01 NOTE — Clinical Note
With this encounter it let me document the results in the order and not the GA code. Just trying to figure out which one is right. Thanks! Thuy Mobley, YOUNG  [facility] [place]

## 2022-03-02 ENCOUNTER — OFFICE VISIT (OUTPATIENT)
Dept: PULMONOLOGY | Facility: CLINIC | Age: 52
End: 2022-03-02
Attending: STUDENT IN AN ORGANIZED HEALTH CARE EDUCATION/TRAINING PROGRAM
Payer: COMMERCIAL

## 2022-03-02 VITALS
SYSTOLIC BLOOD PRESSURE: 117 MMHG | HEIGHT: 63 IN | HEART RATE: 90 BPM | OXYGEN SATURATION: 94 % | BODY MASS INDEX: 41.11 KG/M2 | WEIGHT: 232 LBS | DIASTOLIC BLOOD PRESSURE: 68 MMHG

## 2022-03-02 DIAGNOSIS — B37.0 THRUSH: Primary | ICD-10-CM

## 2022-03-02 PROCEDURE — 99214 OFFICE O/P EST MOD 30 MIN: CPT | Mod: GC | Performed by: STUDENT IN AN ORGANIZED HEALTH CARE EDUCATION/TRAINING PROGRAM

## 2022-03-02 PROCEDURE — G0463 HOSPITAL OUTPT CLINIC VISIT: HCPCS

## 2022-03-02 RX ORDER — NYSTATIN 100000/ML
500000 SUSPENSION, ORAL (FINAL DOSE FORM) ORAL 4 TIMES DAILY
Qty: 200 ML | Refills: 3 | Status: SHIPPED | OUTPATIENT
Start: 2022-03-02 | End: 2022-03-12

## 2022-03-02 NOTE — NURSING NOTE
Chief Complaint   Patient presents with     Follow Up     pre surgery pulmonary clearance      Vitals were taken and medications were reconciled.     Judy King RMA  3:37 PM

## 2022-03-02 NOTE — PROGRESS NOTES
[facility]- General Pulmonary Clinic Follow Up    Assessment and Plan:  Swetha Patterson is a 51 year old female with a history adult onset asthma s/p bronchial thermoplasty in 2015, chronic epigastric pain, chronic pain syndrome, depression, anxiety, HTN, obesity, HLD, and pre-diabetes, who returns to clinic today for pre-op.    Naya-operative Plan  - no barriers from our standpoint for proceeding with surgery  - her Trelegy inhaler should be continued during the hospitalization  - if unable to use Trelegy (ie intubated) then use BID budesonide nebs and QID duonebs  - if able to use Trelegy then use TID albuterol throughout her hospitalization  - use airway clearance of some form to be done by respiratory therapist TID (ie Acapella flutter valve or metanebs)  - use incentive spirometer every hour, up in chair as much as possible, etc.    Asthma: s/p bronchial thermoplasty in 2015. No emphysema on imaging or DLCO decline on recent PFTs to suggest COPD. She is markedly improved after smoking cessation 3 weeks ago and loss of about 25 lbs. Also, no longer having GERD. Has had no exacerbations since her last visit.    - ENT referral deferred given her improved symptoms  - recent stress test negative   - continue Trelegy; has thrush today (will treat with 10 days nystatin QID)    Smoking - in remission, stopped 3 weeks ago    Excessive daytime sleepiness / Snoring - resolved after weight loss, will see sleep medicine anyways    RTC 6 months.     Discussed with Dr. Rukhsana Madison MD  Pulmonary & Critical Care Fellow      I saw and evaluated patient with Fellow.  Case discussed - agree with note.  See above for naya-op recommendations.  I reviewed prior PFT from 7/2020: normal spirometry.  Per old PFTs, DLCO was normal.  This makes emphysema unlikely.  I reiviewed last CXR 11/2021: clear.    JONI GONSALES M.D.    ______________________________________________________________  CC: COPD,  asthma    Referring Provider: Roro Chawla    HPI:    Swetha Patterson is a 51 year old female with a history of COPD, adult onset asthma s/p bronchial thermoplasty in 2015, chronic epigastric pain, chronic pain syndrome, depression, anxiety, HTN, obesity, HLD, and pre-diabetes, who returns to clinic today for follow up of COPD and asthma.    Brief summary (from prior visits):   Ms. Patterson established care with Dr. Grimm in July 2020. She had previously seen Dr. Williamson in 2013 for asthma and cough, and had worsening asthma control leading into 2014. Eventually she was taken for bronchial thermoplasty in Feb 2015 by IP. She subsequently had started smoking again, and had frequent exacerbations requiring antibiotics and steroids.     In the past, she was also evaluated by allergy (allergy to dust mites), and ENT for vocal cord nodules/cysts. She has been found to have irritable larynx syndrome, and SLP was recommended. She also has hiatal hernia and esophagitis.     In July 2020, complained of cough and dyspnea on exertion. She was told to use Trelegy inhaler, and aerobika and albuterol nebs BID were prescribed for mucous clearance. Flonase was to be used at night. She was referred to pulmonary rehab. She also needed ENT follow up, and was referred to sleep medicine. She was supposed to follow up in 3 months, but never followed up.     Interval history: Much better today.  Stopped smoking 3 weeks ago.  Also lost about 25 pounds since starting the bariatric surgery program.  Anticipates having surgery in May.  Since these 2 major changes she has had no exacerbations, her sputum production has completely resolved, is able to ambulate up 3 flights of stairs without any issues whereas prior she had difficulty walking various blocks.  No longer has any leg pain with walking.  No longer snoring and has no daytime sleepiness.  She uses her Trelegy but has not used albuterol regularly at all again since these  "major 2 changes.    Exposure History:  Tobacco: quit smoking - about 35 pack yr history. Father smokes 2 PPD, mostly in the house.   Other inhaled substances: no vaping. No marijuana. No sandblasting, welding, etc. Notes that air fresheners and cleaning supplies tend to irritate the lungs.    Occupation: On disability since 2010, previously worked as . 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   Pets: cat at home. No birds.   Allergies: dust mite allergies.   Hobbies: walks, shopping.   Travel: no recent travel.   Home: Lives in an apartment with father. No known mold issues or water damage. No feather pillows or down comforters.     ROS: Complete 10 point ROS negative unless mentioned in HPI    Allergies and current medications: Reviewed and updated in EMR.     Past medical, surgical, social, and family histories: Personally reviewed and updated in EMR during this visit.     Physical Exam:  /68 (BP Location: Right arm, Patient Position: Chair, Cuff Size: Adult Large)   Pulse 90   Ht 1.6 m (5' 3\")   Wt 105.2 kg (232 lb)   LMP 04/15/2016 (Exact Date)   SpO2 94%   BMI 41.10 kg/m      General: Sitting in the chair in NAD  HEENT: anicteric, mask in place. Thrush.  Neck: no palpable lymphadenopathy  Lymphatic: no cervical or supraclavicular lymphadenopathy   Chest: lung CTAB. No wheezing..   Cardiac: RRR no murmurs  Abdomen: Soft, obese, non tender, active BS  Extremities: No LE Edema, moving all extremities. No digital clubbing.   Neuro: A&Ox3, no focal defects. Speech/language wnl.   Skin: no rash noted on limited exam  Psych: normal affect.     Labs and Radiology: All new data personally reviewed and summarized below. I have reviewed the results with the patient today.     All cardiac studies reviewed by me. Jan 2022.     The nuclear stress test is negative for inducible myocardial ischemia or infarction.     The left ventricular ejection fraction at rest is 68%.  The left ventricular ejection fraction at " stress is greater than 70%.     Left ventricular function is hyperdynamic.     TID is absent.     A prior study was conducted on 6/19/2019.  This study has no change when compared with the prior study.    All imaging studies reviewed by me.     5/28/21 CT chest: Unremarkable CT of the chest without contrast. Lungs are clear. No pleural fluid.    PFT's:  Pulmonary Function Testing: personally reviewed.      Most recent PFT interpretation: normal spirometry, normal TLC, but elevated RV and RV/TLC, suggesting some air trapping is possible.

## 2022-03-02 NOTE — PATIENT INSTRUCTIONS
We're so excited to hear of your progress. Phenomenal work losing weights and not smoking! You'd added years to your life and we're so happy your quality of life is improved.     I've sent nystatin (thrush treatment) to your pharmacy.

## 2022-03-02 NOTE — LETTER
3/2/2022     RE: Swetha Patterson  [id number]  [facility] [address]    Dear Colleague,    Thank you for referring your patient, Swetha Patterson, to the [facility] [place]. Please see a copy of my visit note below.    [facility] Lung [facility] and Health- General Pulmonary Clinic Follow Up    Assessment and Plan:  Swetha Patterson is a 51 year old female with a history adult onset asthma s/p bronchial thermoplasty in 2015, chronic epigastric pain, chronic pain syndrome, depression, anxiety, HTN, obesity, HLD, and pre-diabetes, who returns to clinic today for pre-op.    Naya-operative Plan  - no barriers from our standpoint for proceeding with surgery  - her Trelegy inhaler should be continued during the hospitalization  - if unable to use Trelegy (ie intubated) then use BID budesonide nebs and QID duonebs  - if able to use Trelegy then use TID albuterol throughout her hospitalization  - use airway clearance of some form to be done by respiratory therapist TID (ie Acapella flutter valve or metanebs)  - use incentive spirometer every hour, up in chair as much as possible, etc.    Asthma: s/p bronchial thermoplasty in 2015. No emphysema on imaging or DLCO decline on recent PFTs to suggest COPD. She is markedly improved after smoking cessation 3 weeks ago and loss of about 25 lbs. Also, no longer having GERD. Has had no exacerbations since her last visit.    - ENT referral deferred given her improved symptoms  - recent stress test negative   - continue Trelegy; has thrush today (will treat with 10 days nystatin QID)    Smoking - in remission, stopped 3 weeks ago    Excessive daytime sleepiness / Snoring - resolved after weight loss, will see sleep medicine anyways    RTC 6 months.     Discussed with Dr. Rukhsana Madison MD  Pulmonary & Critical Care Fellow      I saw and evaluated patient with Fellow.  Case discussed - agree with note.  See above for  elizabeth-op recommendations.  I reviewed prior PFT from 7/2020: normal spirometry.  Per old PFTs, DLCO was normal.  This makes emphysema unlikely.  I reiviewed last CXR 11/2021: gonsalo GONSALES M.D.    ______________________________________________________________  CC: COPD, asthma    HPI:    Swetha Patterson is a 51 year old female with a history of COPD, adult onset asthma s/p bronchial thermoplasty in 2015, chronic epigastric pain, chronic pain syndrome, depression, anxiety, HTN, obesity, HLD, and pre-diabetes, who returns to clinic today for follow up of COPD and asthma.    Brief summary (from prior visits):   Ms. Patterson established care with Dr. Grimm in July 2020. She had previously seen Dr. Williamson in 2013 for asthma and cough, and had worsening asthma control leading into 2014. Eventually she was taken for bronchial thermoplasty in Feb 2015 by IP. She subsequently had started smoking again, and had frequent exacerbations requiring antibiotics and steroids.     In the past, she was also evaluated by allergy (allergy to dust mites), and ENT for vocal cord nodules/cysts. She has been found to have irritable larynx syndrome, and SLP was recommended. She also has hiatal hernia and esophagitis.     In July 2020, complained of cough and dyspnea on exertion. She was told to use Trelegy inhaler, and aerobika and albuterol nebs BID were prescribed for mucous clearance. Flonase was to be used at night. She was referred to pulmonary rehab. She also needed ENT follow up, and was referred to sleep medicine. She was supposed to follow up in 3 months, but never followed up.     Interval history: Much better today.  Stopped smoking 3 weeks ago.  Also lost about 25 pounds since starting the bariatric surgery program.  Anticipates having surgery in May.  Since these 2 major changes she has had no exacerbations, her sputum production has completely resolved, is able to ambulate up 3 flights of stairs without any issues whereas  "prior she had difficulty walking various blocks.  No longer has any leg pain with walking.  No longer snoring and has no daytime sleepiness.  She uses her Trelegy but has not used albuterol regularly at all again since these major 2 changes.    Exposure History:  Tobacco: quit smoking - about 35 pack yr history. Father smokes 2 PPD, mostly in the house.   Other inhaled substances: no vaping. No marijuana. No sandblasting, welding, etc. Notes that air fresheners and cleaning supplies tend to irritate the lungs.    Occupation: On disability since 2010, previously worked as . 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   Pets: cat at home. No birds.   Allergies: dust mite allergies.   Hobbies: walks, shopping.   Travel: no recent travel.   Home: Lives in an apartment with father. No known mold issues or water damage. No feather pillows or down comforters.     ROS: Complete 10 point ROS negative unless mentioned in HPI    Allergies and current medications: Reviewed and updated in EMR.     Past medical, surgical, social, and family histories: Personally reviewed and updated in EMR during this visit.     Physical Exam:  /68 (BP Location: Right arm, Patient Position: Chair, Cuff Size: Adult Large)   Pulse 90   Ht 1.6 m (5' 3\")   Wt 105.2 kg (232 lb)   LMP 04/15/2016 (Exact Date)   SpO2 94%   BMI 41.10 kg/m      General: Sitting in the chair in NAD  HEENT: anicteric, mask in place. Thrush.  Neck: no palpable lymphadenopathy  Lymphatic: no cervical or supraclavicular lymphadenopathy   Chest: lung CTAB. No wheezing..   Cardiac: RRR no murmurs  Abdomen: Soft, obese, non tender, active BS  Extremities: No LE Edema, moving all extremities. No digital clubbing.   Neuro: A&Ox3, no focal defects. Speech/language wnl.   Skin: no rash noted on limited exam  Psych: normal affect.     Labs and Radiology: All new data personally reviewed and summarized below. I have reviewed the results with the patient today.     All cardiac " studies reviewed by me. Jan 2022.     The nuclear stress test is negative for inducible myocardial ischemia or infarction.     The left ventricular ejection fraction at rest is 68%.  The left ventricular ejection fraction at stress is greater than 70%.     Left ventricular function is hyperdynamic.     TID is absent.     A prior study was conducted on 6/19/2019.  This study has no change when compared with the prior study.    All imaging studies reviewed by me.     5/28/21 CT chest: Unremarkable CT of the chest without contrast. Lungs are clear. No pleural fluid.    PFT's:  Pulmonary Function Testing: personally reviewed.      Most recent PFT interpretation: normal spirometry, normal TLC, but elevated RV and RV/TLC, suggesting some air trapping is possible.     Again, thank you for allowing me to participate in the care of your patient.      Sincerely,    Mookie Madison MD

## 2022-03-03 NOTE — ASSESSMENT & PLAN NOTE
Here for fibroscan. Doing well with presurgery checklist. Down 17lb since consult in January (>2lb a week) with diet and lifestyle changes. She expresses concerns from aid at home that she is losing too quickly. I do not think this is the case at this time. Hunger and cravings are well controlled.     Blood pressure initially up today due to anxiety about fibroscan.     Plan:  Continue with tasks  Follow up with Rashmi Del Rio PA-C

## 2022-03-04 ENCOUNTER — E-VISIT (OUTPATIENT)
Dept: INTERNAL MEDICINE | Facility: CLINIC | Age: 52
End: 2022-03-04
Payer: COMMERCIAL

## 2022-03-04 ENCOUNTER — TELEPHONE (OUTPATIENT)
Dept: INTERNAL MEDICINE | Facility: CLINIC | Age: 52
End: 2022-03-04

## 2022-03-04 DIAGNOSIS — J20.9 ACUTE BRONCHITIS, UNSPECIFIED ORGANISM: Primary | ICD-10-CM

## 2022-03-04 DIAGNOSIS — J44.1 COPD EXACERBATION (H): ICD-10-CM

## 2022-03-04 PROCEDURE — 99421 OL DIG E/M SVC 5-10 MIN: CPT | Performed by: INTERNAL MEDICINE

## 2022-03-04 RX ORDER — PREDNISONE 10 MG/1
TABLET ORAL
Qty: 14 TABLET | Refills: 0 | Status: SHIPPED | OUTPATIENT
Start: 2022-03-04 | End: 2022-03-15

## 2022-03-04 RX ORDER — AZITHROMYCIN 250 MG/1
TABLET, FILM COATED ORAL
Qty: 6 TABLET | Refills: 0 | Status: SHIPPED | OUTPATIENT
Start: 2022-03-04 | End: 2022-03-15

## 2022-03-04 NOTE — TELEPHONE ENCOUNTER
Patient calls and sounds very ill. She says it COPD and is requesting a refill of a Zpac. She does not want the prednisone as she has some at home.  She has gastric sleeve sx end of  April and does not want to be sick.   She stated she has also quit smoking, has lost 20 lbs and following the diet to qualify for the gastric sleeve sx.       Best number to call back [phone]

## 2022-03-04 NOTE — TELEPHONE ENCOUNTER
Discussed with Dr. Low. Advised evisit. Call to patient advised. Patient will request evisit now.

## 2022-03-07 DIAGNOSIS — R06.02 SOB (SHORTNESS OF BREATH): ICD-10-CM

## 2022-03-07 DIAGNOSIS — R07.2 PRECORDIAL PAIN: ICD-10-CM

## 2022-03-07 RX ORDER — AMLODIPINE BESYLATE 2.5 MG/1
5 TABLET ORAL DAILY
Qty: 180 TABLET | Refills: 2 | Status: SHIPPED | OUTPATIENT
Start: 2022-03-07 | End: 2022-12-28

## 2022-03-09 ENCOUNTER — TELEPHONE (OUTPATIENT)
Dept: SLEEP MEDICINE | Facility: CLINIC | Age: 52
End: 2022-03-09
Payer: COMMERCIAL

## 2022-03-09 DIAGNOSIS — L30.9 ECZEMA, UNSPECIFIED TYPE: ICD-10-CM

## 2022-03-09 NOTE — TELEPHONE ENCOUNTER
Phone call made to patient. Patient was scheduled incorrectly as new consultation. Patient last office visit was with Dr. Whiting on 8/31/2020. Explained to patient that she was seen within the last 3 years and new consultation visit is not needed. Patient okay to cancelled upcoming appointment. Patient has been scheduled for sleep study and return visit for results. Sleep study information packet send via mail to home at [address] [address].      JOSÉ MIGUEL Carbajal  [facility] [place]

## 2022-03-10 NOTE — TELEPHONE ENCOUNTER
Pending Prescriptions:                       Disp   Refills    hydrocortisone 2.5 % cream [Pharmacy Med N*28.35 g1        Sig: APPLY TOPICALLY TO THE AFFECTED AREA(S) TWO TIMES           DAILY.    Routing refill request to provider for review/approval because:  Drug not on the [facility] refill protocol     Please advise, thanks.

## 2022-03-11 RX ORDER — HYDROCORTISONE 2.5 %
CREAM (GRAM) TOPICAL
Qty: 28.35 G | Refills: 1 | Status: SHIPPED | OUTPATIENT
Start: 2022-03-11 | End: 2022-04-12

## 2022-03-15 ENCOUNTER — OFFICE VISIT (OUTPATIENT)
Dept: CARDIOLOGY | Facility: CLINIC | Age: 52
End: 2022-03-15
Attending: INTERNAL MEDICINE
Payer: COMMERCIAL

## 2022-03-15 VITALS
HEIGHT: 63 IN | BODY MASS INDEX: 41 KG/M2 | SYSTOLIC BLOOD PRESSURE: 114 MMHG | HEART RATE: 80 BPM | DIASTOLIC BLOOD PRESSURE: 68 MMHG | WEIGHT: 231.4 LBS

## 2022-03-15 DIAGNOSIS — E66.813 CLASS 3 SEVERE OBESITY DUE TO EXCESS CALORIES WITH SERIOUS COMORBIDITY AND BODY MASS INDEX (BMI) OF 40.0 TO 44.9 IN ADULT (H): ICD-10-CM

## 2022-03-15 DIAGNOSIS — E66.01 CLASS 3 SEVERE OBESITY DUE TO EXCESS CALORIES WITH SERIOUS COMORBIDITY AND BODY MASS INDEX (BMI) OF 40.0 TO 44.9 IN ADULT (H): ICD-10-CM

## 2022-03-15 PROCEDURE — 99214 OFFICE O/P EST MOD 30 MIN: CPT | Performed by: PHYSICIAN ASSISTANT

## 2022-03-15 NOTE — LETTER
3/15/2022    Roro Chawla MD  [address] [facility] [address]    RE: Swetha Patterson       Dear Colleague,     I had the pleasure of seeing Swetha Patterson in the [facility].      CARDIOLOGY CLINIC PROGRESS NOTE    DOS: 03/15/2022      Swetha Patterson  : 1970, 51 year old  MRN: [id number]        Cardiologist:  Dr. Tate     History:  51-year-old female with morbid obesity, peripheral artery disease, COPD, emphysema, GERD.      Seen 2022 by Dr. Lin for atypical chest discomfort.  He recommended Lexiscan stress test which patient underwent 22, which showed normal myocardial perfusion.  LV function was normal, TID was absent.      She saw Dr. Farris 22 to review results.     She saw bariatric surgery and it to have a gastric sleeve, scheduled in May 2022.   This is the bariatric office information:  Weight loss surgery center  MD Julius Trujillo MD Eric Wide, MD Kristi Kopacz, PHILLIP Mobley, JERAD Hernandez, RN  [phone] fax  [phone] phone      She is down from 247 lbs down to 230 lbs.  No chest pain. No exertional chest pain.   Breathing is good. No KAUR or SOB.  She can walk up and down her stairs in her apartment carrying groceries.   No palpitations.  No syncope.  No smoking now at all.   Last echo 2021 shows normal LV function.        ROS:  Skin:  Positive for     Eyes:  Negative    ENT:  Negative    Respiratory:  Positive for    Cardiovascular:  Negative    Gastroenterology: Positive for    Genitourinary:  Negative    Musculoskeletal:  Negative    Neurologic:  Negative    Psychiatric:  Negative    Heme/Lymph/Imm:    allergies  Endocrine:  Positive for diabetes    PAST MEDICAL HISTORY:  Past Medical History:   Diagnosis Date     Anxiety state, unspecified      Asthma      Chronic pain     back pain from cyst     Contact dermatitis and other eczema, due to unspecified cause      COPD (chronic obstructive  "pulmonary disease) (H)      Depressive disorder, not elsewhere classified      Diabetes (H)      Emphysema with chronic bronchitis (H)      Esophageal reflux      Family history of ischemic heart disease      Fibromyalgia      Gastro-oesophageal reflux disease      Heart disease     has chest pain sometimes     History of emphysema      Hoarseness      HTN, goal below 140/90      Hyperlipidemia LDL goal <130      Liver disease     \"fatty liver\"     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)      PONV (postoperative nausea and vomiting)      Rectal bleeding        PAST SURGICAL HISTORY:  Past Surgical History:   Procedure Laterality Date     ANESTHESIA OUT OF OR MRI N/A 10/7/2021    Procedure: ANESTHESIA OUT OF OR MRI;  Surgeon: GENERIC ANESTHESIA PROVIDER;  Location: [facility] OR     ARTHROSCOPY SHOULDER DECOMPRESSION Left 10/21/2015    Procedure: ARTHROSCOPY SHOULDER DECOMPRESSION;  Surgeon: Julien Milian MD;  Location: [facility] OR     BIOPSY ARTERY TEMPORAL Left 3/11/2020    Procedure: LEFT TEMPORAL ARTERY BIOPSY;  Surgeon: Ibeth Conner MD;  Location: [facility] OR     BRONCHIAL THERMOPLASTY N/A 11/14/2014    Procedure: BRONCHIAL THERMOPLASTY;  Surgeon: Ward Whitaker MD;  [facility]: [facility]     BRONCHIAL THERMOPLASTY N/A 12/19/2014    Procedure: BRONCHIAL THERMOPLASTY;  Surgeon: Ward Whitaker MD;  [facility]     BRONCHIAL THERMOPLASTY N/A 2/6/2015    Procedure: BRONCHIAL THERMOPLASTY;  Surgeon: Ward Whitaker MD;  [facility]     COLONOSCOPY N/A 11/26/2018    Procedure: COLONOSCOPY ([facility]);  Surgeon: Marshall Oakes MD;  Location: [facility] OR     COLONOSCOPY N/A 10/22/2020    Procedure: Colonoscopy;  Surgeon: Marshall Mccormick MD;  Location: [facility] OR     DISCECTOMY, FUSION CERVICAL ANTERIOR ONE LEVEL, COMBINED N/A 5/1/2018    Procedure: COMBINED DISCECTOMY, FUSION CERVICAL ANTERIOR ONE LEVEL;  1.  C5-C6 anterior cervical diskectomy and fusion.    2.  C5-C6 application of intervertebral " biomechanical device for interbody fusion purposes.    3.  C5-C6 anterior instrumentation using the standard Kristin InViZia 24 mm plate with four associated bone screws, 12 mm in length.  Inferior screws are fixed.  Superior screws are va     ENT SURGERY  2015    polyps removed from vocal cords      ESOPHAGOSCOPY, GASTROSCOPY, DUODENOSCOPY (EGD), COMBINED N/A 10/22/2020    Procedure: Esophagoscopy, gastroscopy, duodenoscopy with biopsies;  Surgeon: Marshall Mccormick MD;  Location:  OR     ESOPHAGOSCOPY, GASTROSCOPY, DUODENOSCOPY (EGD), COMBINED N/A 6/17/2021    Procedure: ESOPHAGOGASTRODUODENOSCOPY, WITH BIOPSY;  Surgeon: Darrel Damon MD;  Location:  GI     EXCISE MASS TRUNK Left 11/3/2021    Procedure: EXCISION LEFT SHOULDER LIPOMA;  Surgeon: Ibeth Conner MD;  Location:  OR     EXCISE NODE MEDIASTINAL  4/26/2013    Procedure: EXCISE NODE MEDIASTINAL;;  Surgeon: Av Peña MD;  Location:  OR     LAPAROSCOPIC CHOLECYSTECTOMY N/A 10/19/2017    Procedure: LAPAROSCOPIC CHOLECYSTECTOMY;  LAPAROSCOPIC CHOLECYSTECTOMY;  Surgeon: Ney Jerry MD;  Location:  OR     THORACOSCOPY  4/26/2013    Procedure: THORACOSCOPY;  LEFT VIDEO ASSISTED THORACOSCOPY, RESECTION OF POSTERIOR MEDIASTINAL MASS;  Surgeon: Av Peña MD;  Location:  OR     TONSILLECTOMY  as a kid     TONSILLECTOMY       ZZC APPENDECTOMY  at age 17       SOCIAL HISTORY:  Social History     Socioeconomic History     Marital status:      Spouse name: None     Number of children: None     Years of education: None     Highest education level: None   Occupational History     None   Tobacco Use     Smoking status: Former Smoker     Packs/day: 0.50     Years: 30.00     Pack years: 15.00     Types: Cigarettes     Start date: 1/1/1985     Smokeless tobacco: Former User     Quit date: 1/6/2022     Tobacco comment: smokes 1 cigarettes a day - in the process of trying to quit   Vaping Use     Vaping Use: Never  used   Substance and Sexual Activity     Alcohol use: Never     Drug use: No     Sexual activity: Not Currently     Partners: Male     Birth control/protection: None   Other Topics Concern      Service Not Asked     Blood Transfusions Not Asked     Caffeine Concern No     Comment: 4-5 cans of pop daily     Occupational Exposure Not Asked     Hobby Hazards Not Asked     Sleep Concern Not Asked     Stress Concern Not Asked     Weight Concern Not Asked     Special Diet No     Back Care Not Asked     Exercise No     Comment: on hold     Bike Helmet Not Asked     Seat Belt Not Asked     Self-Exams Not Asked     Parent/sibling w/ CABG, MI or angioplasty before 65F 55M? Not Asked   Social History Narrative     None     Social Determinants of Health     Financial Resource Strain: Not on file   Food Insecurity: Not on file   Transportation Needs: Not on file   Physical Activity: Not on file   Stress: Not on file   Social Connections: Not on file   Intimate Partner Violence: Not on file   Housing Stability: Not on file       FAMILY HISTORY:  Family History   Problem Relation Age of Onset     Heart Disease Mother         murmur, mi     Hypertension Mother      Cerebrovascular Disease Mother      Depression Mother      Gallbladder Disease Mother      Asthma Mother      Family History Negative Father         does not know  him     Family History Negative Brother      Heart Disease Maternal Grandfather      Asthma Maternal Grandfather      Hypertension Maternal Grandfather      Cerebrovascular Disease Maternal Grandfather      Diabetes Maternal Grandfather      Asthma Sister      Hypertension Sister      Cerebrovascular Disease Sister      Hypertension Maternal Grandmother      Cerebrovascular Disease Maternal Grandmother        MEDS: albuterol (PROVENTIL) (2.5 MG/3ML) 0.083% neb solution, Take 1 vial (2.5 mg) by nebulization every 6 hours as needed for shortness of breath / dyspnea or wheezing  albuterol (VENTOLIN HFA)  108 (90 Base) MCG/ACT inhaler, Inhale 2 puffs into the lungs every 6 hours  amLODIPine (NORVASC) 2.5 MG tablet, Take 2 tablets (5 mg) by mouth daily  atorvastatin (LIPITOR) 80 MG tablet, TAKE ONE TABLET BY MOUTH EVERY DAY  benzoyl peroxide (ACNE-CLEAR) 10 % external gel, Apply topically 2 times daily as needed  blood glucose (ACCU-CHEK ALLYSON PLUS) test strip, USE TO TEST BLOOD SUGAR ONCE DAILY OR AS DIRECTED  blood glucose (NO BRAND SPECIFIED) lancets standard, Use to test blood sugar 1 times daily or as directed.  blood glucose (NO BRAND SPECIFIED) lancets standard, Use to test blood sugar 1 time daily  blood glucose (NO BRAND SPECIFIED) test strip, Use to test blood sugar 1 times daily or as directed.  Dispense Accu-chek Allyson Plus or per patient insurance  blood glucose monitoring (NO BRAND SPECIFIED) meter device kit, Use to test blood sugar 1 times daily or as directed.  Dispense Accu-chek Allyson Plus or per insurance preference  blood glucose monitoring (NO BRAND SPECIFIED) meter device kit, Use to test blood sugar 1 times daily or as directed.  budesonide-formoterol (SYMBICORT) 160-4.5 MCG/ACT Inhaler, Inhale 2 puffs into the lungs 2 times daily  buPROPion (WELLBUTRIN XL) 150 MG 24 hr tablet, TAKE ONE TABLET BY MOUTH EVERY MORNING  cetirizine (ZYRTEC) 10 MG tablet, TAKE ONE TABLET BY MOUTH EVERY DAY AS NEEDED  cetirizine HCl 10 MG CAPS, Take 1 capsule (10 mg) by mouth daily as needed Takes in the spring.  clindamycin 1 % EX external gel, Apply topically 2 times daily  clonazePAM (KLONOPIN) 0.5 MG tablet, Take 0.5 mg by mouth daily as needed   clonazePAM (KLONOPIN) 1 MG tablet, Take 1 tablet (1 mg) by mouth 2 times daily as needed for anxiety She needs to see her PCP to get more tablets  fluticasone (FLONASE) 50 MCG/ACT nasal spray, Spray 2 sprays in nostril daily (Patient taking differently: Spray 2 sprays in nostril daily as needed )  furosemide (LASIX) 20 MG tablet, TAKE ONE TABLET BY MOUTH TWICE A  DAY  hydrocortisone 2.5 % cream, APPLY TOPICALLY TO THE AFFECTED AREA(S) TWO TIMES DAILY.  hydrOXYzine (ATARAX) 50 MG tablet, TAKE TWO TABLETS BY MOUTH THREE TIMES A DAY AS NEEDED FOR ANXIETY  lamoTRIgine (LAMICTAL) 150 MG tablet, Take 150 mg by mouth daily  lisinopril (ZESTRIL) 20 MG tablet, TAKE ONE TABLET BY MOUTH EVERY DAY  metFORMIN (GLUCOPHAGE) 500 MG tablet, TAKE ONE TABLET BY MOUTH EVERY DAY WITH BREAKFAST  montelukast (SINGULAIR) 10 MG tablet, TAKE ONE TABLET BY MOUTH AT BEDTIME  multivitamin, therapeutic (THERA-VIT) TABS tablet, Take 1 tablet by mouth daily  nitroGLYcerin (NITROSTAT) 0.4 MG sublingual tablet, PLACE ONE TABLET UNDER THE TONGUE AT THE 1ST SIGN OF ATTACK. IF PAIN IS UNRELIEVED OR WORSENED 5 MINUTES AFTER 1ST DOSE, PROMPT MEDICAL ASSISTANCE IS NEEDED. MAY REPEAT EVERY 5 MINUTES UNTIL PAIN IS RELIEVED. MAX OF THREE DOSES  nystatin (MYCOSTATIN) 177201 UNIT/GM external powder, APPLY TOPICALLY TWICE A DAY AS NEEDED SKIN RASH  omeprazole (PRILOSEC) 20 MG DR capsule, TAKE ONE CAPSULE BY MOUTH TWO TIMES A DAY  ondansetron (ZOFRAN) 8 MG tablet, TAKE ONE-HALF TO ONE TABLET BY MOUTH EVERY 8 HOURS AS NEEDED FOR NAUSEA  polyethylene glycol (MIRALAX) 17 GM/Dose powder, Take 17 g (1 capful) by mouth daily  sulfamethoxazole-trimethoprim (BACTRIM DS) 800-160 MG tablet, Take 1 tablet by mouth 2 times daily Take one tablet twice daily. For additional refills, please schedule a follow-up appointment.  terbinafine (LAMISIL) 1 % external cream, APPLY TO AFFECTED AREA(S) TWO TIMES A DAY  Vitamin D3 (CHOLECALCIFEROL) 125 MCG (5000 UT) tablet, Take 1 tablet by mouth three times a week  nicotine (NICODERM CQ) 14 MG/24HR 24 hr patch, Place 1 patch onto the skin every 24 hours (Patient not taking: Reported on 3/15/2022)  nicotine (NICORETTE) 2 MG gum, Place 1 each (2 mg) inside cheek every hour as needed for smoking cessation (Patient not taking: Reported on 3/15/2022)  ondansetron (ZOFRAN-ODT) 4 MG ODT tab, Take 1  "tablet (4 mg) by mouth every 6 hours as needed for nausea (Patient not taking: Reported on 3/15/2022)    No current facility-administered medications on file prior to visit.      ALLERGIES:   Allergies   Allergen Reactions     Codeine Nausea and Vomiting     vomiting     Cyclobenzaprine Nausea     Gabapentin Rash     Hydrocodone Nausea and Vomiting     Sulfa Drugs Nausea and Vomiting     Nausea       PHYSICAL EXAM:  Vitals: /68   Pulse 80   Ht 1.6 m (5' 3\")   Wt 105 kg (231 lb 6.4 oz)   LMP 04/15/2016 (Exact Date)   BMI 40.99 kg/m    Constitutional:  cooperative;in no acute distress obese      Skin:  warm and dry to the touch        Head:  normocephalic        Eyes:  pupils equal and round;conjunctivae and lids unremarkable        ENT:           Neck:  no carotid bruit;JVP normal        Respiratory:      diminished BS but clear    Cardiac: regular rhythm;no murmurs, gallops or rubs detected;normal S1 and S2                  GI:  abdomen soft;BS normoactive obese      Vascular:                                        Extremities and Musculoskeletal:  no deformities, clubbing, cyanosis, erythema observed;no edema        Neurological:  no gross motor deficits;affect appropriate            LABS/DATA:  I reviewed the following:  Ella 1/19/22:  Result Text     The nuclear stress test is negative for inducible myocardial ischemia or infarction.     The left ventricular ejection fraction at rest is 68%.  The left ventricular ejection fraction at stress is greater than 70%.     Left ventricular function is hyperdynamic.     TID is absent.     A prior study was conducted on 6/19/2019.  This study has no change when compared with the prior study.        Echo 7/30/21:  Interpretation Summary  The visual ejection fraction is 55-60%.  Regional wall motion abnormalities cannot be excluded due to limited visualization.  Contrast would enhance imaging of the apex. The study was technically " Difficult.        ASSESSMENT/PLAN:  51-year-old female with morbid obesity, peripheral artery disease, COPD, emphysema, GERD, hypertension.        She has normal LVEF, no anginal discomfort. She recently underwent a stress test which showed no evidence of inducible ischemia with normal LV function.     She has quit smoking.     She has lost about 17 lbs with lifestyle changes.     She is to undergo bariatric surgery in May 2022 (sleeve).     If she is to undergo intraperitoneal surgery (I am not sure how they perform the surgery), this is a high risk surgery but without any cardiac concerns, she would be at low risk for periprocedural complications.   She is also seeing pulm re preop evaluation.          Lesley Morales PA-C    Thank you for allowing me to participate in the care of your patient.      Sincerely,     Lesley Morales PA-C     [facility]  cc:   Allan Farris MD  [address]  [place] [address]

## 2022-03-15 NOTE — PROGRESS NOTES
CARDIOLOGY CLINIC PROGRESS NOTE    DOS: 03/15/2022      Swetha Patterson  : 1970, 51 year old  MRN: [id number]        Cardiologist:  Dr. Tate     History:  51-year-old female with morbid obesity, peripheral artery disease, COPD, emphysema, GERD.      Seen 2022 by Dr. Lin for atypical chest discomfort.  He recommended Lexiscan stress test which patient underwent 22, which showed normal myocardial perfusion.  LV function was normal, TID was absent.      She saw Dr. Farris 22 to review results.     She saw bariatric surgery and it to have a gastric sleeve, scheduled in May 2022.   This is the bariatric office information:  [facility] surgery center  MD Julius Trujillo MD Eric Wide, MD Kristi Kopacz, PHILLIP Mobley, NP  Shakila Hernandez, RN  [phone] fax  [phone] phone      She is down from 247 lbs down to 230 lbs.  No chest pain. No exertional chest pain.   Breathing is good. No KAUR or SOB.  She can walk up and down her stairs in her apartment carrying groceries.   No palpitations.  No syncope.  No smoking now at all.   Last echo 2021 shows normal LV function.        ROS:  Skin:  Positive for     Eyes:  Negative    ENT:  Negative    Respiratory:  Positive for    Cardiovascular:  Negative    Gastroenterology: Positive for    Genitourinary:  Negative    Musculoskeletal:  Negative    Neurologic:  Negative    Psychiatric:  Negative    Heme/Lymph/Imm:    allergies  Endocrine:  Positive for diabetes    PAST MEDICAL HISTORY:  Past Medical History:   Diagnosis Date     Anxiety state, unspecified      Asthma      Chronic pain     back pain from cyst     Contact dermatitis and other eczema, due to unspecified cause      COPD (chronic obstructive pulmonary disease) (H)      Depressive disorder, not elsewhere classified      Diabetes (H)      Emphysema with chronic bronchitis (H)      Esophageal reflux      Family history of ischemic heart disease      Fibromyalgia       "Gastro-oesophageal reflux disease      Heart disease     has chest pain sometimes     History of emphysema      Hoarseness      HTN, goal below 140/90      Hyperlipidemia LDL goal <130      Liver disease     \"fatty liver\"     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)      PONV (postoperative nausea and vomiting)      Rectal bleeding        PAST SURGICAL HISTORY:  Past Surgical History:   Procedure Laterality Date     ANESTHESIA OUT OF OR MRI N/A 10/7/2021    Procedure: ANESTHESIA OUT OF OR MRI;  Surgeon: GENERIC ANESTHESIA PROVIDER;  Location: [facility]     ARTHROSCOPY SHOULDER DECOMPRESSION Left 10/21/2015    Procedure: ARTHROSCOPY SHOULDER DECOMPRESSION;  Surgeon: Julien Milian MD;  Location: [facility]     BIOPSY ARTERY TEMPORAL Left 3/11/2020    Procedure: LEFT TEMPORAL ARTERY BIOPSY;  Surgeon: Ibeth Conner MD;  Location: [facility]     BRONCHIAL THERMOPLASTY N/A 11/14/2014    Procedure: BRONCHIAL THERMOPLASTY;  Surgeon: Ward Whitaker MD;  Location: [facility]     BRONCHIAL THERMOPLASTY N/A 12/19/2014    Procedure: BRONCHIAL THERMOPLASTY;  Surgeon: Ward Whitaker MD;  Location: [facility]     BRONCHIAL THERMOPLASTY N/A 2/6/2015    Procedure: BRONCHIAL THERMOPLASTY;  Surgeon: Ward Whitaker MD;  Location: [facility]     COLONOSCOPY N/A 11/26/2018    Procedure: COLONOSCOPY ([facility]);  Surgeon: Marshall Oakes MD;  Location: [facility]     COLONOSCOPY N/A 10/22/2020    Procedure: Colonoscopy;  Surgeon: Marshall Mccormick MD;  Location: [facility] OR     DISCECTOMY, FUSION CERVICAL ANTERIOR ONE LEVEL, COMBINED N/A 5/1/2018    Procedure: COMBINED DISCECTOMY, FUSION CERVICAL ANTERIOR ONE LEVEL;  1.  C5-C6 anterior cervical diskectomy and fusion.    2.  C5-C6 application of intervertebral biomechanical device for interbody fusion purposes.    3.  C5-C6 anterior instrumentation using the standard Kristin InViZia 24 mm plate with four associated bone screws, 12 mm in length.  Inferior screws are fixed.  Superior screws are " va     ENT SURGERY  2015    polyps removed from vocal cords      ESOPHAGOSCOPY, GASTROSCOPY, DUODENOSCOPY (EGD), COMBINED N/A 10/22/2020    Procedure: Esophagoscopy, gastroscopy, duodenoscopy with biopsies;  Surgeon: Marshall Mccormick MD;  Location: [facility]     ESOPHAGOSCOPY, GASTROSCOPY, DUODENOSCOPY (EGD), COMBINED N/A 6/17/2021    Procedure: ESOPHAGOGASTRODUODENOSCOPY, WITH BIOPSY;  Surgeon: Darrel Damon MD;  Location:  GI     EXCISE MASS TRUNK Left 11/3/2021    Procedure: EXCISION LEFT SHOULDER LIPOMA;  Surgeon: Ibeth Conner MD;  Location:  OR     EXCISE NODE MEDIASTINAL  4/26/2013    Procedure: EXCISE NODE MEDIASTINAL;;  Surgeon: Av Peña MD;  Location:  OR     LAPAROSCOPIC CHOLECYSTECTOMY N/A 10/19/2017    Procedure: LAPAROSCOPIC CHOLECYSTECTOMY;  LAPAROSCOPIC CHOLECYSTECTOMY;  Surgeon: Ney Jerry MD;  Location:  OR     THORACOSCOPY  4/26/2013    Procedure: THORACOSCOPY;  LEFT VIDEO ASSISTED THORACOSCOPY, RESECTION OF POSTERIOR MEDIASTINAL MASS;  Surgeon: Av Peña MD;  Location:  OR     TONSILLECTOMY  as a kid     TONSILLECTOMY       ZZC APPENDECTOMY  at age 17       SOCIAL HISTORY:  Social History     Socioeconomic History     Marital status:      Spouse name: None     Number of children: None     Years of education: None     Highest education level: None   Occupational History     None   Tobacco Use     Smoking status: Former Smoker     Packs/day: 0.50     Years: 30.00     Pack years: 15.00     Types: Cigarettes     Start date: 1/1/1985     Smokeless tobacco: Former User     Quit date: 1/6/2022     Tobacco comment: smokes 1 cigarettes a day - in the process of trying to quit   Vaping Use     Vaping Use: Never used   Substance and Sexual Activity     Alcohol use: Never     Drug use: No     Sexual activity: Not Currently     Partners: Male     Birth control/protection: None   Other Topics Concern      Service Not Asked     Blood  Transfusions Not Asked     Caffeine Concern No     Comment: 4-5 cans of pop daily     Occupational Exposure Not Asked     Hobby Hazards Not Asked     Sleep Concern Not Asked     Stress Concern Not Asked     Weight Concern Not Asked     Special Diet No     Back Care Not Asked     Exercise No     Comment: on hold     Bike Helmet Not Asked     Seat Belt Not Asked     Self-Exams Not Asked     Parent/sibling w/ CABG, MI or angioplasty before 65F 55M? Not Asked   Social History Narrative     None     Social Determinants of Health     Financial Resource Strain: Not on file   Food Insecurity: Not on file   Transportation Needs: Not on file   Physical Activity: Not on file   Stress: Not on file   Social Connections: Not on file   Intimate Partner Violence: Not on file   Housing Stability: Not on file       FAMILY HISTORY:  Family History   Problem Relation Age of Onset     Heart Disease Mother         murmur, mi     Hypertension Mother      Cerebrovascular Disease Mother      Depression Mother      Gallbladder Disease Mother      Asthma Mother      Family History Negative Father         does not know  him     Family History Negative Brother      Heart Disease Maternal Grandfather      Asthma Maternal Grandfather      Hypertension Maternal Grandfather      Cerebrovascular Disease Maternal Grandfather      Diabetes Maternal Grandfather      Asthma Sister      Hypertension Sister      Cerebrovascular Disease Sister      Hypertension Maternal Grandmother      Cerebrovascular Disease Maternal Grandmother        MEDS: albuterol (PROVENTIL) (2.5 MG/3ML) 0.083% neb solution, Take 1 vial (2.5 mg) by nebulization every 6 hours as needed for shortness of breath / dyspnea or wheezing  albuterol (VENTOLIN HFA) 108 (90 Base) MCG/ACT inhaler, Inhale 2 puffs into the lungs every 6 hours  amLODIPine (NORVASC) 2.5 MG tablet, Take 2 tablets (5 mg) by mouth daily  atorvastatin (LIPITOR) 80 MG tablet, TAKE ONE TABLET BY MOUTH EVERY DAY  benzoyl  peroxide (ACNE-CLEAR) 10 % external gel, Apply topically 2 times daily as needed  blood glucose (ACCU-CHEK ALLYSON PLUS) test strip, USE TO TEST BLOOD SUGAR ONCE DAILY OR AS DIRECTED  blood glucose (NO BRAND SPECIFIED) lancets standard, Use to test blood sugar 1 times daily or as directed.  blood glucose (NO BRAND SPECIFIED) lancets standard, Use to test blood sugar 1 time daily  blood glucose (NO BRAND SPECIFIED) test strip, Use to test blood sugar 1 times daily or as directed.  Dispense Accu-chek Allyson Plus or per patient insurance  blood glucose monitoring (NO BRAND SPECIFIED) meter device kit, Use to test blood sugar 1 times daily or as directed.  Dispense Accu-chek Allyson Plus or per insurance preference  blood glucose monitoring (NO BRAND SPECIFIED) meter device kit, Use to test blood sugar 1 times daily or as directed.  budesonide-formoterol (SYMBICORT) 160-4.5 MCG/ACT Inhaler, Inhale 2 puffs into the lungs 2 times daily  buPROPion (WELLBUTRIN XL) 150 MG 24 hr tablet, TAKE ONE TABLET BY MOUTH EVERY MORNING  cetirizine (ZYRTEC) 10 MG tablet, TAKE ONE TABLET BY MOUTH EVERY DAY AS NEEDED  cetirizine HCl 10 MG CAPS, Take 1 capsule (10 mg) by mouth daily as needed Takes in the spring.  clindamycin 1 % EX external gel, Apply topically 2 times daily  clonazePAM (KLONOPIN) 0.5 MG tablet, Take 0.5 mg by mouth daily as needed   clonazePAM (KLONOPIN) 1 MG tablet, Take 1 tablet (1 mg) by mouth 2 times daily as needed for anxiety She needs to see her PCP to get more tablets  fluticasone (FLONASE) 50 MCG/ACT nasal spray, Spray 2 sprays in nostril daily (Patient taking differently: Spray 2 sprays in nostril daily as needed )  furosemide (LASIX) 20 MG tablet, TAKE ONE TABLET BY MOUTH TWICE A DAY  hydrocortisone 2.5 % cream, APPLY TOPICALLY TO THE AFFECTED AREA(S) TWO TIMES DAILY.  hydrOXYzine (ATARAX) 50 MG tablet, TAKE TWO TABLETS BY MOUTH THREE TIMES A DAY AS NEEDED FOR ANXIETY  lamoTRIgine (LAMICTAL) 150 MG tablet, Take 150  mg by mouth daily  lisinopril (ZESTRIL) 20 MG tablet, TAKE ONE TABLET BY MOUTH EVERY DAY  metFORMIN (GLUCOPHAGE) 500 MG tablet, TAKE ONE TABLET BY MOUTH EVERY DAY WITH BREAKFAST  montelukast (SINGULAIR) 10 MG tablet, TAKE ONE TABLET BY MOUTH AT BEDTIME  multivitamin, therapeutic (THERA-VIT) TABS tablet, Take 1 tablet by mouth daily  nitroGLYcerin (NITROSTAT) 0.4 MG sublingual tablet, PLACE ONE TABLET UNDER THE TONGUE AT THE 1ST SIGN OF ATTACK. IF PAIN IS UNRELIEVED OR WORSENED 5 MINUTES AFTER 1ST DOSE, PROMPT MEDICAL ASSISTANCE IS NEEDED. MAY REPEAT EVERY 5 MINUTES UNTIL PAIN IS RELIEVED. MAX OF THREE DOSES  nystatin (MYCOSTATIN) 726537 UNIT/GM external powder, APPLY TOPICALLY TWICE A DAY AS NEEDED SKIN RASH  omeprazole (PRILOSEC) 20 MG DR capsule, TAKE ONE CAPSULE BY MOUTH TWO TIMES A DAY  ondansetron (ZOFRAN) 8 MG tablet, TAKE ONE-HALF TO ONE TABLET BY MOUTH EVERY 8 HOURS AS NEEDED FOR NAUSEA  polyethylene glycol (MIRALAX) 17 GM/Dose powder, Take 17 g (1 capful) by mouth daily  sulfamethoxazole-trimethoprim (BACTRIM DS) 800-160 MG tablet, Take 1 tablet by mouth 2 times daily Take one tablet twice daily. For additional refills, please schedule a follow-up appointment.  terbinafine (LAMISIL) 1 % external cream, APPLY TO AFFECTED AREA(S) TWO TIMES A DAY  Vitamin D3 (CHOLECALCIFEROL) 125 MCG (5000 UT) tablet, Take 1 tablet by mouth three times a week  nicotine (NICODERM CQ) 14 MG/24HR 24 hr patch, Place 1 patch onto the skin every 24 hours (Patient not taking: Reported on 3/15/2022)  nicotine (NICORETTE) 2 MG gum, Place 1 each (2 mg) inside cheek every hour as needed for smoking cessation (Patient not taking: Reported on 3/15/2022)  ondansetron (ZOFRAN-ODT) 4 MG ODT tab, Take 1 tablet (4 mg) by mouth every 6 hours as needed for nausea (Patient not taking: Reported on 3/15/2022)    No current facility-administered medications on file prior to visit.      ALLERGIES:   Allergies   Allergen Reactions     Codeine Nausea  "and Vomiting     vomiting     Cyclobenzaprine Nausea     Gabapentin Rash     Hydrocodone Nausea and Vomiting     Sulfa Drugs Nausea and Vomiting     Nausea       PHYSICAL EXAM:  Vitals: /68   Pulse 80   Ht 1.6 m (5' 3\")   Wt 105 kg (231 lb 6.4 oz)   LMP 04/15/2016 (Exact Date)   BMI 40.99 kg/m    Constitutional:  cooperative;in no acute distress obese      Skin:  warm and dry to the touch        Head:  normocephalic        Eyes:  pupils equal and round;conjunctivae and lids unremarkable        ENT:           Neck:  no carotid bruit;JVP normal        Respiratory:      diminished BS but clear    Cardiac: regular rhythm;no murmurs, gallops or rubs detected;normal S1 and S2                  GI:  abdomen soft;BS normoactive obese      Vascular:                                        Extremities and Musculoskeletal:  no deformities, clubbing, cyanosis, erythema observed;no edema        Neurological:  no gross motor deficits;affect appropriate            LABS/DATA:  I reviewed the following:  Ella 1/19/22:  Result Text     The nuclear stress test is negative for inducible myocardial ischemia or infarction.     The left ventricular ejection fraction at rest is 68%.  The left ventricular ejection fraction at stress is greater than 70%.     Left ventricular function is hyperdynamic.     TID is absent.     A prior study was conducted on 6/19/2019.  This study has no change when compared with the prior study.        Echo 7/30/21:  Interpretation Summary  The visual ejection fraction is 55-60%.  Regional wall motion abnormalities cannot be excluded due to limited visualization.  Contrast would enhance imaging of the apex. The study was technically Difficult.        ASSESSMENT/PLAN:  51-year-old female with morbid obesity, peripheral artery disease, COPD, emphysema, GERD, hypertension.        She has normal LVEF, no anginal discomfort. She recently underwent a stress test which showed no evidence of inducible ischemia " with normal LV function.     She has quit smoking.     She has lost about 17 lbs with lifestyle changes.     She is to undergo bariatric surgery in May 2022 (sleeve).     If she is to undergo intraperitoneal surgery (I am not sure how they perform the surgery), this is a high risk surgery but without any cardiac concerns, she would be at low risk for periprocedural complications.   She is also seeing pulm re preop evaluation.          Lesley Morales PA-C

## 2022-03-16 ENCOUNTER — TELEPHONE (OUTPATIENT)
Dept: ENDOCRINOLOGY | Facility: CLINIC | Age: 52
End: 2022-03-16
Payer: COMMERCIAL

## 2022-03-18 ENCOUNTER — VIRTUAL VISIT (OUTPATIENT)
Dept: ENDOCRINOLOGY | Facility: CLINIC | Age: 52
End: 2022-03-18
Payer: COMMERCIAL

## 2022-03-18 DIAGNOSIS — Z71.3 NUTRITIONAL COUNSELING: Primary | ICD-10-CM

## 2022-03-18 DIAGNOSIS — E66.01 MORBID OBESITY WITH BMI OF 40.0-44.9, ADULT (H): ICD-10-CM

## 2022-03-18 PROCEDURE — 97803 MED NUTRITION INDIV SUBSEQ: CPT | Mod: GT | Performed by: DIETITIAN, REGISTERED

## 2022-03-18 NOTE — LETTER
"3/18/2022       RE: Swetha Patterson  [address]  [facility] [place] [address]     Dear Colleague,    Thank you for referring your patient, Swetha Patterson, to the [facility] [place] at [facility]. Please see a copy of my visit note below.    Swetha Patterson is a 51 year old female who is being evaluated via a billable video visit.      The patient has been notified of following:     \"This video visit will be conducted via a call between you and your physician/provider. We have found that certain health care needs can be provided without the need for an in-person physical exam.  This service lets us provide the care you need with a video conversation.  If a prescription is necessary we can send it directly to your pharmacy.  If lab work is needed we can place an order for that and you can then stop by our lab to have the test done at a later time.    Video visits are billed at different rates depending on your insurance coverage.  Please reach out to your insurance provider with any questions.    If during the course of the call the physician/provider feels a video visit is not appropriate, you will not be charged for this service.\"    How would you like to obtain your AVS? MyChart  If the video visit is dropped, the invitation should be resent by: Text to cell phone: [phone]  Will anyone else be joining your video visit? No      Video-Visit Details    Type of service:  Video Visit    Video Start Time:  11:01 pm  Video End Time: 11:31 pm    Originating Location (pt. Location): Home    Distant Location (provider location):  [facility] [place]     Platform used for Video Visit: AiCuris    During this virtual visit the patient is located in [place], patient verifies this as the location during the entirety of this visit.     Bariatric Nutrition Consultation Note    Reason For Visit: Nutrition " "Assessment    Swetha Patterson is a 51 year old presenting today for return bariatric nutrition consult.   Pt is interested in laparoscopic sleeve gastrectomy.    This is pt's 3rd of 3 required nutrition visits prior to surgery.     Pt referred by NOE Blake on January 20, 2022.    Coordination note:   Needs Psych eval  - Scheduled for May 2nd with Dr. Diaz  Needs letter of support from therapist and psychiatrist   Needs clearance from sleep medicine - Seeing 3/25/22  Needs PCP letter of support  Surgeon meet and greet with Dr. Leach  No nicotine for 3 months prior to surgery - Working on. Quit using the patch about a month ago. Using gum occ  Sobriety from alcohol for 1 year - May 2022     Needs baseline labs - Done 1/24/22  10 lb weight loss from initial - Met      Patient with Co-morbidities of obesity including:  Type II DM yes, last A1C 5.8 on 1/24/22  Renal Failure no  Sleep apnea yes  Hypertension yes  Dyslipidemia yes  Joint pain no  Back pain no  GERD no     PMH: fatty liver (nonalcoholic per pt), fibromyalgia, cholecystomy, asthma    Support System Reviewed With Patient 1/19/2022   Who do you have in your support network that can be available to help you for the first 2 weeks after surgery? I have my sister Rukhsana a very close friend of keri Mckinley and I have my dad and I also have psychiatrist my sister Rukhsana Kearns my dad and my psychiatrist again Rukhsana Milian   Who can you count on for support throughout your weight loss surgery journey? Rukhsana Kearns my dad my psychiatrist       ANTHROPOMETRICS:  Consult weight 1/20/22: 247 lbs with BMI 43.75    Estimated body mass index is 40.99 kg/m  as calculated from the following:    Height as of 3/15/22: 1.6 m (5' 3\").    Weight as of 3/15/22: 105 kg (231 lb 6.4 oz).     Current weight: 221 lbs pt reported but recent doctor note says 231 lbs    Required weight loss goal pre-op: 10 lbs from initial consult weight (goal weight 237 " lbs or less before surgery)       1/19/2022   I have tried the following methods to lose weight Watching portions or calories, Exercise, Physician directed program       Weight Loss Questions Reviewed With Patient 1/19/2022   How long have you been overweight? From Middle age and beyond       SUPPLEMENT INFORMATION:  Vit D 5000 IUS/ MWF    Vit D WNL 1/24/22, hx of vit d def    NUTRITION HISTORY:  NKFA    Appointment today focused on reviewing the dietary guidelines for surgery and answering pt questions.     Pt reports she wants surgery to improve her health. Recent diabetes dx.  Pt shared she doesn t know much portions or what she should be eating in general.     Working on cutting out pop.  Does not eat a lot of bread unless having a burger.     Fluids: diet pop occ (small amounts per pt - likes the carbonation),  juice rarely, lots of water (up to 4-5 12 oz bottles per pt, 48 -60 oz). Takes a water pill per pt.     Currently eats fast and does not chew well per pt. Pt reports overeating to the point she will involuntarily vomit.     Pt seen at [facility] previously - not able to have surgery at that time due to mental health/alcohol concerns.     Pt s friend had sleeve surgery - is familiar with some of it through her. Went well for friend.    Nicotine: Smoked for ~40+ years per pt. Quit cigarettes two weeks ago, using patch and gum. Refer to [facility]. None for 3 months prior to surgery    Alcohol: hx of abuse. Sober since May 2021. No surgery until May 2022    Feb 2022:    Pt reports things are going well. Feeling better physically. COPD symptoms improving.     Has been having bad pain from hiatal hernia.     Was able to get off Glipizide yesterday - will monitor next few weeks.     Lost 17 lbs last month.    Example meal: wrap with vegetables   Example snack: sugar-free fruit bar, fruit cups, bananas    Eating differently - doing less carbs, not eating potatoes, bread, or rice currently.   Drinking lots of  water.  Doing leaner protein and no red meat.  Reading labels - trying to do lower fat and lower sugar  No fast food  Reducing portion sizes - measuring food    Drinking lots of water - about 6 bottles per day per pt.     Still drinking fluids with meal time - but use to  slam  water at meal time and has started drinking slower.   Drinking tomato juice occ - dilutes with water.     Nicotine - quit smoking about 1 week ago. Using patch currently.     PA: lots of walking, no treadmill yet due to COPD.     March 2022:    Discussed task list for surgery.     Doing well with dietary goals  ? Chewing food well (counting to 15-20)  ? Smaller portions   ? Continues to be alcohol free  ? Continues to drink slower   ?  fluids from meal time for most part (might have a sip here/there) - at about 20 minutes with none right now    Protein sources: pb, yogurt, cheese, chicken and turkey     Recent recall:  - Banana  - Mixed fruit  - Cheese   - 2 Tbsp pb on celery   - Vegetable Soup   - 1 slice of pepperoni pizza     PA: walking lots      Recall Diet Questions Reviewed With Patient 1/19/2022   Describe what you typically consume for breakfast (typical or most recent): I usually don't eat much for breakfast maybe a piece of toast.   Describe what you typically consume for lunch (typical or most recent): Sometimes hot dogs or macaroni and cheese or hamburgers or fast food or eggs or just about anything   Describe what you typically consume for supper (typical or most recent): Always have to have meat with my dinner potato vegetable if it's corn or green beans but I prefer peas and then I snack at night time before bed because I'm so hungry   Describe what you typically consume as snacks (typical or most recent): Cereal bars chips sometimes fruit a hamburger fast food   How many ounces of water, or other low calorie drinks, do you drink daily (8 oz=1 glass)? 48 oz   How many ounces of caffeine (coffee, tea, pop) do you  drink daily (8 oz=1 glass)? 8 oz   How many ounces of carbonated (pop, beer, sparkling water) drinks do you drinky daily (8 oz=1 glass)? 8 oz   How many ounces of juice, pop, sweet tea, sports drinks, protein drinks, other sweetened drinks, do you drink daily (8 oz=1 glass)? 24 oz   How many ounces of milk do you drink daily (8 oz=1 glass) 8 oz   Please indicate the type of milk: 2%   How often do you drink alcohol? Never   If you do drink alcohol, how many drinks might you have in a day? (one drink = 5 oz. wine, 1 can/bottle of beer, 1 shot liquor) -       Eating Habits 1/19/2022   Do you have any dietary restrictions? Yes   Do you currently binge eat (eat a large amount of food in a short time)? -   Are you an emotional eater? No   Do you get up to eat after falling asleep? Yes   What foods do you crave? I crave Pizza macaroni and cheese [employer] hamburger french fries but I do still eat my vegetables     Dining Out History Reviewed With Patient 1/19/2022   How often do you dine out? A couple of times a week.   Where do you dine out? (select all that apply) fast food chains, take out   What types of food do you order when you dine out? Some type of meat potato and of course you got to have a green beans or corn       Physical Activity Reviewed With Patient 2/22/2022   How often do you exercise? Daily   What is the duration of your exercise (in minutes)? 30 Minutes   What types of exercise do you do? walking, other   What keeps you from being more active?  I am as active as I can possbily be       NUTRITION DIAGNOSIS:  Obesity r/t long history of positive energy balance aeb BMI >30 kg/m2.    INTERVENTION:  Intervention Provided/Education Provided on post-op diet guidelines, vitamins/minerals essential post-operatively, GI anatomy of bariatric surgeries, ways to help prepare for post-op diet guidelines pre-operatively, portion/calorie-control, mindful eating and sources of protein.  Patient demonstrates understanding.  Provided pt with list of goals RD contact information.      Questions Reviewed With Patient 1/19/2022   How ready are you to make changes regarding your weight? Number 1 = Not ready at all to make changes up to 10 = very ready. 10   How confident are you that you can change? 1 = Not confident that you will be successful making changes up to 10 = very confident. 10       Expected Engagement: good    GOALS:    1. Track protein intake for a few days    Protein Sources   [email or web address]     2. Keep up the great work!    - No alcohol   - No smoking   - Seperating fluids from meal time    - Sipping fluids   - Chewing/pace      High Fiber Foods:  Bran cereal, 1/3 cup, 8.6 gm fiber   Cooked kidney beans   cup 7.9 gm  Cooked lentils   cup 7.8 gm  Cooked black beans   cup 7.6 gm  Canned chickpeas   cup 5.3 gm  Baked beans   cup 5.2 gm  Pear 1 5.1 gm  Soybeans   cup 5.1 gm  Quinoa   cup 5 gm  Baked sweet potato, with skin 1 medium 4.8 gm  Baked potato, with skin 1 medium 4.4 gm  Cooked frozen green peas   cup 4.4 gm  Bulgur   cup 4.1 gm  Cooked frozen mixed vegetables   cup 4 gm  Raspberries   cup 4 gm  Blackberries   cup 3.8 gm  Almonds 1 oz 3.5 gm  Cooked frozen spinach   cup 3.5 gm  Vegetable or soy yony 1 each 3.4 gm  Apple 1 medium 3.3 gm  Dried dates 5 pieces 3.3 gm      Surgery resources:  Diet Guidelines after Weight Loss Surgery  [email or web address]     Post-op Diet Advancement Schedule:  Clear Liquid Diet (stage 1):  TBD   *No RED, PROTEIN or PULP day before surgery  Low-Fat Full Liquid Diet (stage 2): TBD (2 weeks total between clear/liquid)  Pureed Diet (stage 3): TBD (2 weeks)  Soft Diet (stage 4): TBD (4 weeks)  Regular Diet (stage 5): TBD     Take the following after a Sleeve Gastrectomy:  - Multivitamin/minerals: adult dose 1-2 times daily  Ideally want one that provides:   5,000 - 10,000 international units vitamin A daily              800 mg oral folate daily  8 - 22 mg zinc and 1 - 2  mg copper daily  12 mg Thiamine  - Iron: 45-60 mg elemental (18-36 mg if low risk) - may partly or fully be covered in multivitamin   - Calcium Citrate containing vitamin D: 500 mg 3 times daily or 600 mg 2 times daily              - Separate the calcium from your multivitamin or iron by at least 2 hours.               - Must be a chewable calcium citrate until post-op 3 months               - Options for calcium citrate: Beni calcium citrate chewable, bariatric advantage calcium citrate chewable, Celebrate vitamins calcium citrate chewable, Bariatric Fusion calcium citrate chewable  - Vitamin D - at least 3,000 international units/day between all supplements  - Vitamin B12: sublingual form of at least 500 mcg daily or injection of 1000 mcg monthly         Weight loss resources:    Diabetes Plate Method:  https://www.diabetesfoodhub.org/articles/what-is-the-diabetes-plate-method.html    Diabetes Recipe Resources:  https://www.diabetesfoodhub.org/   https://www.cdc.gov/diabetes/library/spotlights/hack-your-snack.html   https://www.eatright.org/food/nutrition/dietary-guidelines-and-myplate/how-to-add-whole-grains-to-your-diet   https://videof.me/recipes   https://www.diabetes.org/healthy-living/recipes-nutrition   https://videof.me/diabetic-recipe/turkey-veggie-snacks     Carbohydrates  [email or web address]     Carbohydrate Counting  [email or web address]     Guide To Carbohydrate Counting  [email or web address]     Mindful Eating  [email or web address]      Summary of Volumetrics Eating Plan  [email or web address]        Follow up:   Friday, April 15th at 11:00 am    Time spent with patient: 30 minutes.  SHELLY Neal, RD, LD

## 2022-03-18 NOTE — PATIENT INSTRUCTIONS
GOALS:    1. Track protein intake for a few days    Protein Sources   [email or web address]     2. Keep up the great work!    - No alcohol   - No smoking   - Seperating fluids from meal time    - Sipping fluids   - Chewing/pace      High Fiber Foods:  Bran cereal, 1/3 cup, 8.6 gm fiber   Cooked kidney beans   cup 7.9 gm  Cooked lentils   cup 7.8 gm  Cooked black beans   cup 7.6 gm  Canned chickpeas   cup 5.3 gm  Baked beans   cup 5.2 gm  Pear 1 5.1 gm  Soybeans   cup 5.1 gm  Quinoa   cup 5 gm  Baked sweet potato, with skin 1 medium 4.8 gm  Baked potato, with skin 1 medium 4.4 gm  Cooked frozen green peas   cup 4.4 gm  Bulgur   cup 4.1 gm  Cooked frozen mixed vegetables   cup 4 gm  Raspberries   cup 4 gm  Blackberries   cup 3.8 gm  Almonds 1 oz 3.5 gm  Cooked frozen spinach   cup 3.5 gm  Vegetable or soy yony 1 each 3.4 gm  Apple 1 medium 3.3 gm  Dried dates 5 pieces 3.3 gm    Surgery resources:  Diet Guidelines after Weight Loss Surgery  [email or web address]     Post-op Diet Advancement Schedule:  Clear Liquid Diet (stage 1):  TBD   *No RED, PROTEIN or PULP day before surgery  Low-Fat Full Liquid Diet (stage 2): TBD (2 weeks total between clear/liquid)  Pureed Diet (stage 3): TBD (2 weeks)  Soft Diet (stage 4): TBD (4 weeks)  Regular Diet (stage 5): TBD     Take the following after a Sleeve Gastrectomy:  - Multivitamin/minerals: adult dose 1-2 times daily  Ideally want one that provides:   5,000 - 10,000 international units vitamin A daily              800 mg oral folate daily  8 - 22 mg zinc and 1 - 2 mg copper daily  12 mg Thiamine  - Iron: 45-60 mg elemental (18-36 mg if low risk) - may partly or fully be covered in multivitamin   - Calcium Citrate containing vitamin D: 500 mg 3 times daily or 600 mg 2 times daily              - Separate the calcium from your multivitamin or iron by at least 2 hours.               - Must be a chewable calcium citrate until post-op 3 months               -  Options for calcium citrate: Beni calcium citrate chewable, bariatric advantage calcium citrate chewable, Celebrate vitamins calcium citrate chewable, Bariatric Fusion calcium citrate chewable  - Vitamin D - at least 3,000 international units/day between all supplements  - Vitamin B12: sublingual form of at least 500 mcg daily or injection of 1000 mcg monthly        Weight loss resources:    Diabetes Plate Method:  https://www.diabetesfoodhub.org/articles/what-is-the-diabetes-plate-method.html    Diabetes Recipe Resources:  https://www.diabetesfoodhub.org/   https://www.cdc.gov/diabetes/library/spotlights/hack-your-snack.html   https://www.eatright.org/food/nutrition/dietary-guidelines-and-myplate/how-to-add-whole-grains-to-your-diet   https://[employer]/recipes   https://www.diabetes.org/healthy-living/recipes-nutrition   https://[employer]/diabetic-recipe/turkey-veggie-snacks     Carbohydrates  [email or web address]     Carbohydrate Counting  [email or web address]     Guide To Carbohydrate Counting  [email or web address]     Mindful Eating  [email or web address]      Summary of Volumetrics Eating Plan  [email or web address]     Follow up:   Friday, April 15th at 11:00 am    SHELYL Henning, OCTAVIO, LD  Clinic #: [phone]

## 2022-03-18 NOTE — PROGRESS NOTES
"Swetha Patterson is a 51 year old female who is being evaluated via a billable video visit.      The patient has been notified of following:     \"This video visit will be conducted via a call between you and your physician/provider. We have found that certain health care needs can be provided without the need for an in-person physical exam.  This service lets us provide the care you need with a video conversation.  If a prescription is necessary we can send it directly to your pharmacy.  If lab work is needed we can place an order for that and you can then stop by our lab to have the test done at a later time.    Video visits are billed at different rates depending on your insurance coverage.  Please reach out to your insurance provider with any questions.    If during the course of the call the physician/provider feels a video visit is not appropriate, you will not be charged for this service.\"    How would you like to obtain your AVS? MyChart  If the video visit is dropped, the invitation should be resent by: Text to cell phone: [phone]  Will anyone else be joining your video visit? No      Video-Visit Details    Type of service:  Video Visit    Video Start Time:  11:01 pm  Video End Time: 11:31 pm    Originating Location (pt. Location): Home    Distant Location (provider location):  [facility] [place]     Platform used for Video Visit: [employer]    During this virtual visit the patient is located in [place], patient verifies this as the location during the entirety of this visit.     Bariatric Nutrition Consultation Note    Reason For Visit: Nutrition Assessment    Swetha Patterson is a 51 year old presenting today for return bariatric nutrition consult.   Pt is interested in laparoscopic sleeve gastrectomy.    This is pt's 3rd of 3 required nutrition visits prior to surgery.     Pt referred by NOE Blake on January 20, 2022.    Coordination note:   Needs Psych eval  - " "Scheduled for May 2nd with Dr. Diaz  Needs letter of support from therapist and psychiatrist   Needs clearance from sleep medicine - Seeing 3/25/22  Needs PCP letter of support  Surgeon meet and greet with Dr. Haylee Leblanc nicotine for 3 months prior to surgery - Working on. Quit using the patch about a month ago. Using gum occ  Sobriety from alcohol for 1 year - May 2022     Needs baseline labs - Done 1/24/22  10 lb weight loss from initial - Met      Patient with Co-morbidities of obesity including:  Type II DM yes, last A1C 5.8 on 1/24/22  Renal Failure no  Sleep apnea yes  Hypertension yes  Dyslipidemia yes  Joint pain no  Back pain no  GERD no     PMH: fatty liver (nonalcoholic per pt), fibromyalgia, cholecystomy, asthma    Support System Reviewed With Patient 1/19/2022   Who do you have in your support network that can be available to help you for the first 2 weeks after surgery? I have my sister Rukhsana a very close friend of keri Kaplanmy and I have my dad and I also have psychiatrist my sister Rukhsana Haynesgurwinder Kearns my dad and my psychiatrist again Rukhsana Milian   Who can you count on for support throughout your weight loss surgery journey? Rukhsana Kearns my dad my psychiatrist       ANTHROPOMETRICS:  Consult weight 1/20/22: 247 lbs with BMI 43.75    Estimated body mass index is 40.99 kg/m  as calculated from the following:    Height as of 3/15/22: 1.6 m (5' 3\").    Weight as of 3/15/22: 105 kg (231 lb 6.4 oz).     Current weight: 221 lbs pt reported but recent doctor note says 231 lbs    Required weight loss goal pre-op: 10 lbs from initial consult weight (goal weight 237 lbs or less before surgery)       1/19/2022   I have tried the following methods to lose weight Watching portions or calories, Exercise, Physician directed program       Weight Loss Questions Reviewed With Patient 1/19/2022   How long have you been overweight? From Middle age and beyond       SUPPLEMENT INFORMATION:  Vit D 5000 " IUS/ MWF    Vit D WNL 1/24/22, hx of vit d def    NUTRITION HISTORY:  NKFA    Appointment today focused on reviewing the dietary guidelines for surgery and answering pt questions.     Pt reports she wants surgery to improve her health. Recent diabetes dx.  Pt shared she doesn t know much portions or what she should be eating in general.     Working on cutting out pop.  Does not eat a lot of bread unless having a burger.     Fluids: diet pop occ (small amounts per pt - likes the carbonation),  juice rarely, lots of water (up to 4-5 12 oz bottles per pt, 48 -60 oz). Takes a water pill per pt.     Currently eats fast and does not chew well per pt. Pt reports overeating to the point she will involuntarily vomit.     Pt seen at [facility] previously - not able to have surgery at that time due to mental health/alcohol concerns.     Pt s friend had sleeve surgery - is familiar with some of it through her. Went well for friend.    Nicotine: Smoked for ~40+ years per pt. Quit cigarettes two weeks ago, using patch and gum. Refer to [facility]. None for 3 months prior to surgery    Alcohol: hx of abuse. Sober since May 2021. No surgery until May 2022    Feb 2022:    Pt reports things are going well. Feeling better physically. COPD symptoms improving.     Has been having bad pain from hiatal hernia.     Was able to get off Glipizide yesterday - will monitor next few weeks.     Lost 17 lbs last month.    Example meal: wrap with vegetables   Example snack: sugar-free fruit bar, fruit cups, bananas    Eating differently - doing less carbs, not eating potatoes, bread, or rice currently.   Drinking lots of water.  Doing leaner protein and no red meat.  Reading labels - trying to do lower fat and lower sugar  No fast food  Reducing portion sizes - measuring food    Drinking lots of water - about 6 bottles per day per pt.     Still drinking fluids with meal time - but use to  slam  water at meal time and has started drinking slower.    Drinking tomato juice occ - dilutes with water.     Nicotine - quit smoking about 1 week ago. Using patch currently.     PA: lots of walking, no treadmill yet due to COPD.     March 2022:    Discussed task list for surgery.     Doing well with dietary goals  ? Chewing food well (counting to 15-20)  ? Smaller portions   ? Continues to be alcohol free  ? Continues to drink slower   ?  fluids from meal time for most part (might have a sip here/there) - at about 20 minutes with none right now    Protein sources: pb, yogurt, cheese, chicken and turkey     Recent recall:  - Banana  - Mixed fruit  - Cheese   - 2 Tbsp pb on celery   - Vegetable Soup   - 1 slice of pepperoni pizza     PA: walking lots      Recall Diet Questions Reviewed With Patient 1/19/2022   Describe what you typically consume for breakfast (typical or most recent): I usually don't eat much for breakfast maybe a piece of toast.   Describe what you typically consume for lunch (typical or most recent): Sometimes hot dogs or macaroni and cheese or hamburgers or fast food or eggs or just about anything   Describe what you typically consume for supper (typical or most recent): Always have to have meat with my dinner potato vegetable if it's corn or green beans but I prefer peas and then I snack at night time before bed because I'm so hungry   Describe what you typically consume as snacks (typical or most recent): Cereal bars chips sometimes fruit a hamburger fast food   How many ounces of water, or other low calorie drinks, do you drink daily (8 oz=1 glass)? 48 oz   How many ounces of caffeine (coffee, tea, pop) do you drink daily (8 oz=1 glass)? 8 oz   How many ounces of carbonated (pop, beer, sparkling water) drinks do you drinky daily (8 oz=1 glass)? 8 oz   How many ounces of juice, pop, sweet tea, sports drinks, protein drinks, other sweetened drinks, do you drink daily (8 oz=1 glass)? 24 oz   How many ounces of milk do you drink daily (8 oz=1  glass) 8 oz   Please indicate the type of milk: 2%   How often do you drink alcohol? Never   If you do drink alcohol, how many drinks might you have in a day? (one drink = 5 oz. wine, 1 can/bottle of beer, 1 shot liquor) -       Eating Habits 1/19/2022   Do you have any dietary restrictions? Yes   Do you currently binge eat (eat a large amount of food in a short time)? -   Are you an emotional eater? No   Do you get up to eat after falling asleep? Yes   What foods do you crave? I crave Pizza macaroni and cheese [employer] hamburger french fries but I do still eat my vegetables     Dining Out History Reviewed With Patient 1/19/2022   How often do you dine out? A couple of times a week.   Where do you dine out? (select all that apply) fast food chains, take out   What types of food do you order when you dine out? Some type of meat potato and of course you got to have a green beans or corn       Physical Activity Reviewed With Patient 2/22/2022   How often do you exercise? Daily   What is the duration of your exercise (in minutes)? 30 Minutes   What types of exercise do you do? walking, other   What keeps you from being more active?  I am as active as I can possbily be       NUTRITION DIAGNOSIS:  Obesity r/t long history of positive energy balance aeb BMI >30 kg/m2.    INTERVENTION:  Intervention Provided/Education Provided on post-op diet guidelines, vitamins/minerals essential post-operatively, GI anatomy of bariatric surgeries, ways to help prepare for post-op diet guidelines pre-operatively, portion/calorie-control, mindful eating and sources of protein.  Patient demonstrates understanding. Provided pt with list of goals RD contact information.      Questions Reviewed With Patient 1/19/2022   How ready are you to make changes regarding your weight? Number 1 = Not ready at all to make changes up to 10 = very ready. 10   How confident are you that you can change? 1 = Not confident that you will be successful making  changes up to 10 = very confident. 10       Expected Engagement: good    GOALS:    1. Track protein intake for a few days    Protein Sources   [email or web address]     2. Keep up the great work!    - No alcohol   - No smoking   - Seperating fluids from meal time    - Sipping fluids   - Chewing/pace      High Fiber Foods:  Bran cereal, 1/3 cup, 8.6 gm fiber   Cooked kidney beans   cup 7.9 gm  Cooked lentils   cup 7.8 gm  Cooked black beans   cup 7.6 gm  Canned chickpeas   cup 5.3 gm  Baked beans   cup 5.2 gm  Pear 1 5.1 gm  Soybeans   cup 5.1 gm  Quinoa   cup 5 gm  Baked sweet potato, with skin 1 medium 4.8 gm  Baked potato, with skin 1 medium 4.4 gm  Cooked frozen green peas   cup 4.4 gm  Bulgur   cup 4.1 gm  Cooked frozen mixed vegetables   cup 4 gm  Raspberries   cup 4 gm  Blackberries   cup 3.8 gm  Almonds 1 oz 3.5 gm  Cooked frozen spinach   cup 3.5 gm  Vegetable or soy yony 1 each 3.4 gm  Apple 1 medium 3.3 gm  Dried dates 5 pieces 3.3 gm      Surgery resources:  Diet Guidelines after Weight Loss Surgery  [email or web address]     Post-op Diet Advancement Schedule:  Clear Liquid Diet (stage 1):  TBD   *No RED, PROTEIN or PULP day before surgery  Low-Fat Full Liquid Diet (stage 2): TBD (2 weeks total between clear/liquid)  Pureed Diet (stage 3): TBD (2 weeks)  Soft Diet (stage 4): TBD (4 weeks)  Regular Diet (stage 5): TBD     Take the following after a Sleeve Gastrectomy:  - Multivitamin/minerals: adult dose 1-2 times daily  Ideally want one that provides:   5,000 - 10,000 international units vitamin A daily              800 mg oral folate daily  8 - 22 mg zinc and 1 - 2 mg copper daily  12 mg Thiamine  - Iron: 45-60 mg elemental (18-36 mg if low risk) - may partly or fully be covered in multivitamin   - Calcium Citrate containing vitamin D: 500 mg 3 times daily or 600 mg 2 times daily              - Separate the calcium from your multivitamin or iron by at least 2 hours.               - Must  be a chewable calcium citrate until post-op 3 months               - Options for calcium citrate: Beni calcium citrate chewable, bariatric advantage calcium citrate chewable, Celebrate vitamins calcium citrate chewable, Bariatric Fusion calcium citrate chewable  - Vitamin D - at least 3,000 international units/day between all supplements  - Vitamin B12: sublingual form of at least 500 mcg daily or injection of 1000 mcg monthly         Weight loss resources:    Diabetes Plate Method:  https://www.diabetesfoodhub.org/articles/what-is-the-diabetes-plate-method.html    Diabetes Recipe Resources:  https://www.diabetesfoodhub.org/   https://www.cdc.gov/diabetes/library/spotlights/hack-your-snack.html   https://www.eatright.org/food/nutrition/dietary-guidelines-and-myplate/how-to-add-whole-grains-to-your-diet   https://UserTesting/recipes   https://www.diabetes.org/healthy-living/recipes-nutrition   https://UserTesting/diabetic-recipe/turkey-veggie-snacks     Carbohydrates  [email or web address]     Carbohydrate Counting  [email or web address]     Guide To Carbohydrate Counting  [email or web address]     Mindful Eating  [email or web address]      Summary of Volumetrics Eating Plan  [email or web address]        Follow up:   Friday, April 15th at 11:00 am    Time spent with patient: 30 minutes.  SHELLY Neal, RD, LD

## 2022-03-30 ENCOUNTER — TELEPHONE (OUTPATIENT)
Dept: NEUROPSYCHOLOGY | Facility: CLINIC | Age: 52
End: 2022-03-30
Payer: COMMERCIAL

## 2022-03-30 NOTE — TELEPHONE ENCOUNTER
Patient called stating she is leaving for [place] on 4/16 and will return the 2nd week of May. Currently scheduled 5/2 with Dr. Diaz but discussed she cannot do a video visit if patient is in [place], needs to be back in [place].  I offered her a late June psychological evaluation appointment but she does not want to wait that long. I asked her to call me when she is back in [facility] and I will give her the names of some other psychologists to see if she can get in sooner.

## 2022-03-31 ENCOUNTER — TELEPHONE (OUTPATIENT)
Dept: INTERNAL MEDICINE | Facility: CLINIC | Age: 52
End: 2022-03-31

## 2022-03-31 ENCOUNTER — VIRTUAL VISIT (OUTPATIENT)
Dept: INTERNAL MEDICINE | Facility: CLINIC | Age: 52
End: 2022-03-31
Payer: COMMERCIAL

## 2022-03-31 DIAGNOSIS — E66.813 CLASS 3 SEVERE OBESITY DUE TO EXCESS CALORIES WITHOUT SERIOUS COMORBIDITY WITH BODY MASS INDEX (BMI) OF 50.0 TO 59.9 IN ADULT (H): Primary | ICD-10-CM

## 2022-03-31 DIAGNOSIS — E11.9 TYPE 2 DIABETES MELLITUS WITHOUT COMPLICATION, WITHOUT LONG-TERM CURRENT USE OF INSULIN (H): ICD-10-CM

## 2022-03-31 DIAGNOSIS — E66.01 CLASS 3 SEVERE OBESITY DUE TO EXCESS CALORIES WITHOUT SERIOUS COMORBIDITY WITH BODY MASS INDEX (BMI) OF 50.0 TO 59.9 IN ADULT (H): Primary | ICD-10-CM

## 2022-03-31 PROCEDURE — 99215 OFFICE O/P EST HI 40 MIN: CPT | Mod: GT | Performed by: INTERNAL MEDICINE

## 2022-03-31 RX ORDER — LAMOTRIGINE 200 MG/1
200 TABLET ORAL DAILY
COMMUNITY
Start: 2022-03-15 | End: 2023-01-27

## 2022-03-31 ASSESSMENT — ANXIETY QUESTIONNAIRES
7. FEELING AFRAID AS IF SOMETHING AWFUL MIGHT HAPPEN: SEVERAL DAYS
5. BEING SO RESTLESS THAT IT IS HARD TO SIT STILL: SEVERAL DAYS
1. FEELING NERVOUS, ANXIOUS, OR ON EDGE: SEVERAL DAYS
2. NOT BEING ABLE TO STOP OR CONTROL WORRYING: NOT AT ALL
6. BECOMING EASILY ANNOYED OR IRRITABLE: SEVERAL DAYS
GAD7 TOTAL SCORE: 6
3. WORRYING TOO MUCH ABOUT DIFFERENT THINGS: SEVERAL DAYS

## 2022-03-31 ASSESSMENT — PATIENT HEALTH QUESTIONNAIRE - PHQ9: 5. POOR APPETITE OR OVEREATING: SEVERAL DAYS

## 2022-03-31 NOTE — PATIENT INSTRUCTIONS
Plan:  1. Semaglutide 3 mg daily -- for diabetes and obesity    If it is not covered, patient needs to call insurance and find out which equivalent is covered.    2. Follow up video in 3 weeks

## 2022-03-31 NOTE — PROGRESS NOTES
Swetha is a 51 year old who is being evaluated via a billable video visit.      How would you like to obtain your AVS? Mail a copy  If the video visit is dropped, the invitation should be resent by: Text to cell phone: [phone]  Will anyone else be joining your video visit? No        This is a VIDEO ( using [employer])  encounter with the patient.       Location of the provider : office   Location of the patient : home      07:43 --- no answer. I have tried to connect [employer] and [employer]  08:15 -- 08:28 - w the pt   09:20  -- 09:36  - w the pt   10:15 -- 10:28 -- additional chart documentation           Dr Low's note      Patient's instructions / PLAN:                                                        Plan:  1. Semaglutide 3 mg daily -- for diabetes and obesity    If it is not covered, patient needs to call insurance and find out which equivalent is covered.    2. Follow up video in 3 weeks  3. Letter for the [facility] loss clinic       ASSESSMENT & PLAN:                                                      Obesity with comorbidity wants bariatric surgery. I am supporting her decision.  The surgery is scheduled for May. She was able to loose 27 lbs w diet and exercise.  I thought it was in her interest to try Semaglutide for more weight control.  She became very emotional and cried because she is working on bariatric surgery plan for many months. I explained her again I am not opposing it. I wrote the necessary letter for the [facility] loss clinic.     (E66.01,  Z68.43) Class 3 severe obesity due to excess calories without serious comorbidity with body mass index (BMI) of 50.0 to 59.9 in adult (H)  (primary encounter diagnosis)  (E11.9) Type 2 diabetes mellitus without complication, without long-term current use of insulin (H)  Comment: We discussed about the new meds, advantages and potential side effects. The patient will read also the info from the pharmacy and call back if questions.   Plan: Semaglutide 3 MG  "TABS               Chief complaint:                                                      Bariatric surgery     SUBJECTIVE:                                                    History of present illness:    Obesity  -- she lost 27 lbs with diet and exercise   -- the pulmonology told her she is ok for the surgery   -- she wants to pursue the bariatric surgery. She understood that it will take care of the hernia and the \"belly fat\" . I explained her she will loose weight including abd fat, but I very much doubt she will have abdominoplasty at the time of the bariatric surgery. She seemed surprised.   -- last EGD: gastropathy, HH, esophagitis. She is telling me that her HH will be fixed. I see no notes about the planned surgery so I can't comment        History of Present Illness       Reason for visit:  Bariatric surgery  Symptom onset:  More than a month  Symptoms include:  Na  Symptom intensity:  Mild  Symptom progression:  Improving  Had these symptoms before:  No  What makes it worse:  No nothing I have lost a lot of weight from [id number] very proud of myself and my health is so much better  What makes it better:  Yes eating right and exercising    She eats 2-3 servings of fruits and vegetables daily.She consumes 0 sweetened beverage(s) daily.She exercises with enough effort to increase her heart rate 30 to 60 minutes per day.  She exercises with enough effort to increase her heart rate 5 days per week.   She is taking medications regularly.       Diabetes Follow-up    How often are you checking your blood sugar? One time daily  What time of day are you checking your blood sugars (select all that apply)?  Before meals  Have you had any blood sugars above 200?  No  Have you had any blood sugars below 70?  No    What symptoms do you notice when your blood sugar is low?  None    What concerns do you have today about your diabetes? None     Do you have any of these symptoms? (Select all that apply)  No numbness or " tingling in feet.  No redness, sores or blisters on feet.  No complaints of excessive thirst.  No reports of blurry vision.  No significant changes to weight.              Hyperlipidemia Follow-Up      Are you regularly taking any medication or supplement to lower your cholesterol?   Yes- atorvastatin    Are you having muscle aches or other side effects that you think could be caused by your cholesterol lowering medication?  Yes- muscle aches    Hypertension Follow-up      Do you check your blood pressure regularly outside of the clinic? Yes     Are you following a low salt diet? Yes    Are your blood pressures ever more than 140 on the top number (systolic) OR more   than 90 on the bottom number (diastolic), for example 140/90? No    BP Readings from Last 2 Encounters:   03/15/22 114/68   03/02/22 117/68     Hemoglobin A1C POCT (%)   Date Value   10/16/2020 5.6   02/20/2020 6.0 (H)     Hemoglobin A1C (%)   Date Value   01/24/2022 5.8 (H)     LDL Cholesterol Calculated (mg/dL)   Date Value   01/24/2022 80   02/20/2020 96   05/10/2019 176 (H)       Review of Systems:                                                      ROS: negative for fever, chills, cough, wheezes, chest pain, shortness of breath, vomiting, abdominal pain, leg swelling         OBJECTIVE:           An actual physical exam can't be done during phone visit   A limited exam can sometimes be performed by video visit   NAD      PMHx: reviewed  Past Medical History:   Diagnosis Date     Anxiety state, unspecified      Asthma      Chronic pain     back pain from cyst     Contact dermatitis and other eczema, due to unspecified cause      COPD (chronic obstructive pulmonary disease) (H)      Depressive disorder, not elsewhere classified      Diabetes (H)      Emphysema with chronic bronchitis (H)      Esophageal reflux      Family history of ischemic heart disease      Fibromyalgia      Gastro-oesophageal reflux disease      Heart disease     has chest pain  "sometimes     History of emphysema      Hoarseness      HTN, goal below 140/90      Hyperlipidemia LDL goal <130      Liver disease     \"fatty liver\"     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)      PONV (postoperative nausea and vomiting)      Rectal bleeding       PSHx: reviewed  Past Surgical History:   Procedure Laterality Date     ANESTHESIA OUT OF OR MRI N/A 10/7/2021    Procedure: ANESTHESIA OUT OF OR MRI;  Surgeon: GENERIC ANESTHESIA PROVIDER;  Location: [facility]     ARTHROSCOPY SHOULDER DECOMPRESSION Left 10/21/2015    Procedure: ARTHROSCOPY SHOULDER DECOMPRESSION;  Surgeon: Julien Milian MD;  Location: [facility]     BIOPSY ARTERY TEMPORAL Left 3/11/2020    Procedure: LEFT TEMPORAL ARTERY BIOPSY;  Surgeon: Ibeth Conner MD;  Location: [facility]     BRONCHIAL THERMOPLASTY N/A 11/14/2014    Procedure: BRONCHIAL THERMOPLASTY;  Surgeon: Ward Whitaker MD;  [facility]     BRONCHIAL THERMOPLASTY N/A 12/19/2014    Procedure: BRONCHIAL THERMOPLASTY;  Surgeon: Ward Whitaker MD;  [facility]     BRONCHIAL THERMOPLASTY N/A 2/6/2015    Procedure: BRONCHIAL THERMOPLASTY;  Surgeon: Ward Whitaker MD;  [facility]     COLONOSCOPY N/A 11/26/2018    Procedure: COLONOSCOPY ([facility]);  Surgeon: Marshall Oakes MD;  Location: [facility]     COLONOSCOPY N/A 10/22/2020    Procedure: Colonoscopy;  Surgeon: Marshall Mccormick MD;  [facility]: [facility]     DISCECTOMY, FUSION CERVICAL ANTERIOR ONE LEVEL, COMBINED N/A 5/1/2018    Procedure: COMBINED DISCECTOMY, FUSION CERVICAL ANTERIOR ONE LEVEL;  1.  C5-C6 anterior cervical diskectomy and fusion.    2.  C5-C6 application of intervertebral biomechanical device for interbody fusion purposes.    3.  C5-C6 anterior instrumentation using the standard Kristin InViZia 24 mm plate with four associated bone screws, 12 mm in length.  Inferior screws are fixed.  Superior screws are va     ENT SURGERY  2015    polyps removed from vocal cords      ESOPHAGOSCOPY, " GASTROSCOPY, DUODENOSCOPY (EGD), COMBINED N/A 10/22/2020    Procedure: Esophagoscopy, gastroscopy, duodenoscopy with biopsies;  Surgeon: Marshall Mccormick MD;  [facility]: [facility]     ESOPHAGOSCOPY, GASTROSCOPY, DUODENOSCOPY (EGD), COMBINED N/A 6/17/2021    Procedure: ESOPHAGOGASTRODUODENOSCOPY, WITH BIOPSY;  Surgeon: Darrel Damon MD;  Location:  GI     EXCISE MASS TRUNK Left 11/3/2021    Procedure: EXCISION LEFT SHOULDER LIPOMA;  Surgeon: Ibeth Conner MD;  Location:  OR     EXCISE NODE MEDIASTINAL  4/26/2013    Procedure: EXCISE NODE MEDIASTINAL;;  Surgeon: Av Peña MD;  Location:  OR     LAPAROSCOPIC CHOLECYSTECTOMY N/A 10/19/2017    Procedure: LAPAROSCOPIC CHOLECYSTECTOMY;  LAPAROSCOPIC CHOLECYSTECTOMY;  Surgeon: Ney Jerry MD;  Location:  OR     THORACOSCOPY  4/26/2013    Procedure: THORACOSCOPY;  LEFT VIDEO ASSISTED THORACOSCOPY, RESECTION OF POSTERIOR MEDIASTINAL MASS;  Surgeon: Av Peña MD;  Location:  OR     TONSILLECTOMY  as a kid     TONSILLECTOMY       ZZC APPENDECTOMY  at age 17        Meds: reviewed  Current Outpatient Medications   Medication Sig Dispense Refill     albuterol (PROVENTIL) (2.5 MG/3ML) 0.083% neb solution Take 1 vial (2.5 mg) by nebulization every 6 hours as needed for shortness of breath / dyspnea or wheezing 25 vial 3     albuterol (VENTOLIN HFA) 108 (90 Base) MCG/ACT inhaler Inhale 2 puffs into the lungs every 6 hours 3 each 3     amLODIPine (NORVASC) 2.5 MG tablet Take 2 tablets (5 mg) by mouth daily 180 tablet 2     atorvastatin (LIPITOR) 80 MG tablet TAKE ONE TABLET BY MOUTH EVERY DAY 90 tablet 3     benzoyl peroxide (ACNE-CLEAR) 10 % external gel Apply topically 2 times daily as needed       blood glucose (ACCU-CHEK ALLYSON PLUS) test strip USE TO TEST BLOOD SUGAR ONCE DAILY OR AS DIRECTED 100 strip 3     blood glucose (NO BRAND SPECIFIED) lancets standard Use to test blood sugar 1 times daily or as directed. 100 each 3      blood glucose (NO BRAND SPECIFIED) lancets standard Use to test blood sugar 1 time daily 100 each 1     blood glucose (NO BRAND SPECIFIED) test strip Use to test blood sugar 1 times daily or as directed.  Dispense Accu-chek Olinda Plus or per patient insurance 100 strip 3     blood glucose monitoring (NO BRAND SPECIFIED) meter device kit Use to test blood sugar 1 times daily or as directed.  Dispense Accu-chek Olinda Plus or per insurance preference 1 kit 0     blood glucose monitoring (NO BRAND SPECIFIED) meter device kit Use to test blood sugar 1 times daily or as directed. 1 kit 0     budesonide-formoterol (SYMBICORT) 160-4.5 MCG/ACT Inhaler Inhale 2 puffs into the lungs 2 times daily 30.6 g 3     buPROPion (WELLBUTRIN XL) 150 MG 24 hr tablet TAKE ONE TABLET BY MOUTH EVERY MORNING 90 tablet 2     cetirizine (ZYRTEC) 10 MG tablet TAKE ONE TABLET BY MOUTH EVERY DAY AS NEEDED 90 tablet 3     cetirizine HCl 10 MG CAPS Take 1 capsule (10 mg) by mouth daily as needed Takes in the spring. 90 capsule 3     clindamycin 1 % EX external gel Apply topically 2 times daily 60 g 3     clonazePAM (KLONOPIN) 0.5 MG tablet Take 0.5 mg by mouth daily as needed        clonazePAM (KLONOPIN) 1 MG tablet Take 1 tablet (1 mg) by mouth 2 times daily as needed for anxiety She needs to see her PCP to get more tablets 5 tablet 0     fluticasone (FLONASE) 50 MCG/ACT nasal spray Spray 2 sprays in nostril daily (Patient taking differently: Spray 2 sprays in nostril daily as needed ) 16 g 5     furosemide (LASIX) 20 MG tablet TAKE ONE TABLET BY MOUTH TWICE A  tablet 3     hydrocortisone 2.5 % cream APPLY TOPICALLY TO THE AFFECTED AREA(S) TWO TIMES DAILY. 28.35 g 1     hydrOXYzine (ATARAX) 50 MG tablet TAKE TWO TABLETS BY MOUTH THREE TIMES A DAY AS NEEDED FOR ANXIETY 70 tablet 1     lamoTRIgine (LAMICTAL) 150 MG tablet Take 150 mg by mouth daily       lamoTRIgine (LAMICTAL) 200 MG tablet Take 200 mg by mouth daily       lisinopril (ZESTRIL)  20 MG tablet TAKE ONE TABLET BY MOUTH EVERY DAY 90 tablet 0     metFORMIN (GLUCOPHAGE) 500 MG tablet TAKE ONE TABLET BY MOUTH EVERY DAY WITH BREAKFAST 90 tablet 1     montelukast (SINGULAIR) 10 MG tablet TAKE ONE TABLET BY MOUTH AT BEDTIME 90 tablet 0     multivitamin, therapeutic (THERA-VIT) TABS tablet Take 1 tablet by mouth daily       nicotine (NICODERM CQ) 14 MG/24HR 24 hr patch Place 1 patch onto the skin every 24 hours 28 patch 1     nicotine (NICORETTE) 2 MG gum Place 1 each (2 mg) inside cheek every hour as needed for smoking cessation 240 each 1     nitroGLYcerin (NITROSTAT) 0.4 MG sublingual tablet PLACE ONE TABLET UNDER THE TONGUE AT THE 1ST SIGN OF ATTACK. IF PAIN IS UNRELIEVED OR WORSENED 5 MINUTES AFTER 1ST DOSE, PROMPT MEDICAL ASSISTANCE IS NEEDED. MAY REPEAT EVERY 5 MINUTES UNTIL PAIN IS RELIEVED. MAX OF THREE DOSES 25 tablet 11     nystatin (MYCOSTATIN) 600300 UNIT/GM external powder APPLY TOPICALLY TWICE A DAY AS NEEDED SKIN RASH 45 g 3     omeprazole (PRILOSEC) 20 MG DR capsule TAKE ONE CAPSULE BY MOUTH TWO TIMES A  capsule 1     ondansetron (ZOFRAN) 8 MG tablet TAKE ONE-HALF TO ONE TABLET BY MOUTH EVERY 8 HOURS AS NEEDED FOR NAUSEA 30 tablet 1     ondansetron (ZOFRAN-ODT) 4 MG ODT tab Take 1 tablet (4 mg) by mouth every 6 hours as needed for nausea 12 tablet 1     polyethylene glycol (MIRALAX) 17 GM/Dose powder Take 17 g (1 capful) by mouth daily 507 g 1     sulfamethoxazole-trimethoprim (BACTRIM DS) 800-160 MG tablet Take 1 tablet by mouth 2 times daily Take one tablet twice daily. For additional refills, please schedule a follow-up appointment. 180 tablet 1     terbinafine (LAMISIL) 1 % external cream APPLY TO AFFECTED AREA(S) TWO TIMES A DAY 30 g 2     Vitamin D3 (CHOLECALCIFEROL) 125 MCG (5000 UT) tablet Take 1 tablet by mouth three times a week         Soc Hx: reviewed  Fam Hx: reviewed    42 minutes spent on the date of the encounter doing   chart review,   review of outside  records,   review of test results,   interpretation of tests,   patient visit,   documentation,         Roro Low MD  Internal Medicine

## 2022-03-31 NOTE — TELEPHONE ENCOUNTER
Kiara from [facility] calling for recertification of home care for skilled nursing  Ok to call and nettie [phone]

## 2022-04-01 ENCOUNTER — TELEPHONE (OUTPATIENT)
Dept: INTERNAL MEDICINE | Facility: CLINIC | Age: 52
End: 2022-04-01
Payer: COMMERCIAL

## 2022-04-01 ASSESSMENT — ANXIETY QUESTIONNAIRES: GAD7 TOTAL SCORE: 6

## 2022-04-01 NOTE — TELEPHONE ENCOUNTER
[facility] is calling for orders. SKILLED NURSING     1/wk for 8wks Disease and medication management.     Kiara [phone]  Ok to leave .

## 2022-04-01 NOTE — TELEPHONE ENCOUNTER
Call to Kiara with [facility] at [phone]. Kiara informed of Dr. Low's below response. Kiara verbalized understanding.     Jessica HALL RN   [facility]

## 2022-04-04 ENCOUNTER — MEDICAL CORRESPONDENCE (OUTPATIENT)
Dept: HEALTH INFORMATION MANAGEMENT | Facility: CLINIC | Age: 52
End: 2022-04-04
Payer: COMMERCIAL

## 2022-04-05 ENCOUNTER — TELEPHONE (OUTPATIENT)
Dept: ENDOCRINOLOGY | Facility: CLINIC | Age: 52
End: 2022-04-05

## 2022-04-05 NOTE — PROGRESS NOTES
[facility] Dermatology Note  Encounter Date: Apr 7, 2022  Office Visit     Dermatology Problem List:  1.Hidradenitis suppurativaFidel stage II  -Current tx: TMP--160 mg BID, BPO wash, clindamycin gel  -Consider derm surg referral/excision at follow-up  -Prior tx: doxycycline 100 mg BID for many years, oral clindamycin gave her diarrhea  2. Hair loss, suspect androgenetic  -minoxidil 5% solution BID  3. Dermatitis  -triamcinolone 0.025% oint, emollients  ____________________________________________    Assessment & Plan:    # HSPrincess stage II - currently well controlled.   -continue Bactrim -160mg BID, refilled today for 6mo - tolerating well and it has drastically cut back on her flares  -continue clindamycin gel topically on abdomen as well as BPO washes  -encouraged continued weight loss as it may have a positive effect on HS     # Hair Loss - likely androgenetic  -start minoxidil 5% solution once of ideally twice daily to the scalp  -edu that it may take 6mo for minoxidil to have noticeable effect and that increased shedding can occur initially.     # Skin tags - chest x5, L neck x 1  - Cryotherapy procedure note: Location: chest, neck. After verbal consent and discussion of risks and benefits including but no limited to dyspigmentation/scar, blister, and pain, 6 was(were) treated with 1-2mm freeze border for 2 cycles with liquid nitrogen. Post cryotherapy instructions were provided.     # dermatitis - L forearm is somewhat lichenified, R neck  -cerave moisturizing cream BID  -avoid fragrances product   -Stop suave lotion  -start dove sensitive soap  - Start triamcinolone 0.025% ointment BID to AA for up to 2 weeks    # arthropod bite reaction, though I do not think it is likely, given patient's living conditions and need to care for her father will treat empirically for now  - start permethrin head to toe overnight, repeat in 1 week. also treat father who lives with her as  "well.     #Stucco keratoses - bilateral LEs  -reassured benign  -cerave, occasional triamcinolone for occasional pruritis      Procedures Performed:   Cryo - see above    Follow-up: 6 month(s) in-person, or earlier for new or changing lesions    Staff and Scribe:     Scribe Disclosure:  I, Carol Jhonathan, am serving as a scribe to prep the notes for Ashley Antonio PA-C.  Provider Disclosure:   The documentation recorded by the scribe accurately reflects the services I personally performed and the decisions made by me.    All risks, benefits and alternatives were discussed with patient.  Patient is in agreement and understands the assessment and plan.  All questions were answered.    Ashley Antonio PA-C, MPAS  [facility] [place]: Phone: [phone], Fax: [phone]  [facility]: Phone: [phone],  Fax: [phone]  [facility]: Phone: [phone], Fax: [phone]  ____________________________________________    CC: No chief complaint on file.    HPI:  Ms. Swetha Patterson is a(n) 51 year old female who presents today as a return patient for several concerns. Last seen by myself virtually on 11/19/21 at which point she was continued on Bactrim BID for 6 months, topical clindamycin gel, and BP washes for treatment of HS. For hair loss, she was advised to start using OTC minoxidil.     Notes \"bites\" on arms and legs. Patient cares for her father who is 78yo and cannot get him to bathe, so she thinks it may be related to him. Notes she can feel herself getting bit. Notes the individual spots itch. Mostly when they first show up ar the itchiness. Plans on getting home health care for her father.     Currently on bactrim now. Notes she still gets drainage from her abdomen. Overall doing well.     Notes she showers daily. Uses suave moisturizer.    Plans on bariatric surgery soon, working with weight management. "  Recently quit smoking.     Patient is otherwise feeling well, without additional skin concerns.    Labs Reviewed:  none    Physical Exam:  Vitals: LMP 04/15/2016 (Exact Date)   SKIN: Full skin, which includes the head/face, both arms, chest, back, abdomen,both legs, genitalia and/or groin buttocks, digits and/or nails, was examined.  - decreased hair density on the frontal and mid scalp.  -scalp appears nml, no erythema, no scaling.   -negative hair pull test  -few scars and open comedones on the abdomen as well as in the bilateral axillae and R groin, no draining or active abscesses, nodules, pustules or tunnels/tracts  - There is(are) skin colored pedunculated papules with erythema on the L neck x1, chest x5.   - There are waxy stuck on white papules on the bilateral LEs.   - L forearm with a pink scaly slightly lichenified patch, similar on the R posterior auricular/neck  - No other lesions of concern on areas examined.     Medications:  Current Outpatient Medications   Medication     albuterol (PROVENTIL) (2.5 MG/3ML) 0.083% neb solution     albuterol (VENTOLIN HFA) 108 (90 Base) MCG/ACT inhaler     amLODIPine (NORVASC) 2.5 MG tablet     atorvastatin (LIPITOR) 80 MG tablet     benzoyl peroxide (ACNE-CLEAR) 10 % external gel     blood glucose (ACCU-CHEK ALLYSON PLUS) test strip     blood glucose (NO BRAND SPECIFIED) lancets standard     blood glucose (NO BRAND SPECIFIED) lancets standard     blood glucose (NO BRAND SPECIFIED) test strip     blood glucose monitoring (NO BRAND SPECIFIED) meter device kit     blood glucose monitoring (NO BRAND SPECIFIED) meter device kit     budesonide-formoterol (SYMBICORT) 160-4.5 MCG/ACT Inhaler     buPROPion (WELLBUTRIN XL) 150 MG 24 hr tablet     cetirizine (ZYRTEC) 10 MG tablet     cetirizine HCl 10 MG CAPS     clindamycin 1 % EX external gel     clonazePAM (KLONOPIN) 0.5 MG tablet     clonazePAM (KLONOPIN) 1 MG tablet     fluticasone (FLONASE) 50 MCG/ACT nasal spray      furosemide (LASIX) 20 MG tablet     hydrocortisone 2.5 % cream     hydrOXYzine (ATARAX) 50 MG tablet     lamoTRIgine (LAMICTAL) 150 MG tablet     lamoTRIgine (LAMICTAL) 200 MG tablet     lisinopril (ZESTRIL) 20 MG tablet     metFORMIN (GLUCOPHAGE) 500 MG tablet     montelukast (SINGULAIR) 10 MG tablet     multivitamin, therapeutic (THERA-VIT) TABS tablet     nicotine (NICODERM CQ) 14 MG/24HR 24 hr patch     nicotine (NICORETTE) 2 MG gum     nitroGLYcerin (NITROSTAT) 0.4 MG sublingual tablet     nystatin (MYCOSTATIN) 793329 UNIT/GM external powder     omeprazole (PRILOSEC) 20 MG DR capsule     ondansetron (ZOFRAN) 8 MG tablet     ondansetron (ZOFRAN-ODT) 4 MG ODT tab     polyethylene glycol (MIRALAX) 17 GM/Dose powder     Semaglutide 3 MG TABS     sulfamethoxazole-trimethoprim (BACTRIM DS) 800-160 MG tablet     terbinafine (LAMISIL) 1 % external cream     Vitamin D3 (CHOLECALCIFEROL) 125 MCG (5000 UT) tablet     No current facility-administered medications for this visit.      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)     DIAZ (nonalcoholic steatohepatitis)     Status post cervical spinal fusion     Depression     Vocal cord polyp     HTN, goal below 140/90     COPD, moderate (H)     Claudication of both lower extremities (H)     PAD (peripheral artery disease) (H)     Jaw claudication     Morbid obesity with BMI of 40.0-44.9, adult (H)     Anorectal disorder     Change in bowel movement     Diaphragmatic hernia     Digestive symptom     Epigastric pain     Nausea     Steatosis of liver     Rectal pain     Past Medical History:   Diagnosis Date     Anxiety state, unspecified      Asthma      Chronic pain     back pain from cyst     Contact dermatitis and other eczema, due to  "unspecified cause      COPD (chronic obstructive pulmonary disease) (H)      Depressive disorder, not elsewhere classified      Diabetes (H)      Emphysema with chronic bronchitis (H)      Esophageal reflux      Family history of ischemic heart disease      Fibromyalgia      Gastro-oesophageal reflux disease      Heart disease     has chest pain sometimes     History of emphysema      Hoarseness      HTN, goal below 140/90      Hyperlipidemia LDL goal <130      Liver disease     \"fatty liver\"     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)      PONV (postoperative nausea and vomiting)      Rectal bleeding         CC Roro Janine Chawla MD  [address] [place] [address] on close of this encounter.    "

## 2022-04-07 ENCOUNTER — OFFICE VISIT (OUTPATIENT)
Dept: DERMATOLOGY | Facility: CLINIC | Age: 52
End: 2022-04-07
Payer: COMMERCIAL

## 2022-04-07 DIAGNOSIS — L64.9 ANDROGENETIC ALOPECIA: ICD-10-CM
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
## 2022-04-07 DIAGNOSIS — L82.1 STUCCO KERATOSES: ICD-10-CM

## 2022-04-07 DIAGNOSIS — L91.8 SKIN TAG: ICD-10-CM

## 2022-04-07 DIAGNOSIS — L73.2 HIDRADENITIS SUPPURATIVA: ICD-10-CM

## 2022-04-07 DIAGNOSIS — L30.9 DERMATITIS: Primary | ICD-10-CM

## 2022-04-07 PROCEDURE — 11200 RMVL SKIN TAGS UP TO&INC 15: CPT | Performed by: PHYSICIAN ASSISTANT

## 2022-04-07 PROCEDURE — 99214 OFFICE O/P EST MOD 30 MIN: CPT | Mod: 25 | Performed by: PHYSICIAN ASSISTANT

## 2022-04-07 RX ORDER — CLINDAMYCIN PHOSPHATE 10 MG/G
GEL TOPICAL 2 TIMES DAILY
Qty: 60 G | Refills: 3 | Status: SHIPPED | OUTPATIENT
Start: 2022-04-07

## 2022-04-07 RX ORDER — SULFAMETHOXAZOLE/TRIMETHOPRIM 800-160 MG
1 TABLET ORAL 2 TIMES DAILY
Qty: 180 TABLET | Refills: 1 | Status: SHIPPED | OUTPATIENT
Start: 2022-04-07 | End: 2023-05-18

## 2022-04-07 RX ORDER — BENZOYL PEROXIDE 10 G/100G
GEL TOPICAL 2 TIMES DAILY PRN
Qty: 90 G | Refills: 1 | Status: SHIPPED | OUTPATIENT
Start: 2022-04-07

## 2022-04-07 RX ORDER — TRIAMCINOLONE ACETONIDE 0.25 MG/G
OINTMENT TOPICAL 2 TIMES DAILY
Qty: 80 G | Refills: 1 | Status: SHIPPED | OUTPATIENT
Start: 2022-04-07

## 2022-04-07 RX ORDER — CERAMIDE 1,3,6-II/SALICYLIC/B3
1 CLEANSER (ML) TOPICAL 2 TIMES DAILY
Qty: 453 G | Refills: 11 | Status: SHIPPED | OUTPATIENT
Start: 2022-04-07

## 2022-04-07 ASSESSMENT — PAIN SCALES - GENERAL: PAINLEVEL: NO PAIN (0)

## 2022-04-07 NOTE — LETTER
4/7/2022       RE: Swetha Patterson  [id number]  [facility] [place] [address]     Dear Colleague,    Thank you for referring your patient, Swetha Patterson, to the [facility] [place] at [facility]. Please see a copy of my visit note below.    [facility] Dermatology Note  Encounter Date: Apr 7, 2022  Office Visit     Dermatology Problem List:  1.Hidradenitis suppurativa, Fidel stage II  -Current tx: TMP--160 mg BID, BPO wash, clindamycin gel  -Consider derm surg referral/excision at follow-up  -Prior tx: doxycycline 100 mg BID for many years, oral clindamycin gave her diarrhea  2. Hair loss, suspect androgenetic  -minoxidil 5% solution BID  3. Dermatitis  -triamcinolone 0.025% oint, emollients  ____________________________________________    Assessment & Plan:    # HS, Princess stage II - currently well controlled.   -continue Bactrim -160mg BID, refilled today for 6mo - tolerating well and it has drastically cut back on her flares  -continue clindamycin gel topically on abdomen as well as BPO washes  -encouraged continued weight loss as it may have a positive effect on HS     # Hair Loss - likely androgenetic  -start minoxidil 5% solution once of ideally twice daily to the scalp  -edu that it may take 6mo for minoxidil to have noticeable effect and that increased shedding can occur initially.     # Skin tags - chest x5, L neck x 1  - Cryotherapy procedure note: Location: chest, neck. After verbal consent and discussion of risks and benefits including but no limited to dyspigmentation/scar, blister, and pain, 6 was(were) treated with 1-2mm freeze border for 2 cycles with liquid nitrogen. Post cryotherapy instructions were provided.     # dermatitis - L forearm is somewhat lichenified, R neck  -cerave moisturizing cream BID  -avoid fragrances product   -Stop suave lotion  -start dove sensitive soap  - Start  "triamcinolone 0.025% ointment BID to AA for up to 2 weeks    # arthropod bite reaction, though I do not think it is likely, given patient's living conditions and need to care for her father will treat empirically for now  - start permethrin head to toe overnight, repeat in 1 week. also treat father who lives with her as well.     #Stucco keratoses - bilateral LEs  -reassured benign  -cerave, occasional triamcinolone for occasional pruritis      Procedures Performed:   Cryo - see above    Follow-up: 6 month(s) in-person, or earlier for new or changing lesions    Staff and Scribe:     Scribe Disclosure:  I, Carol Ring, am serving as a scribe to prep the notes for Ashley Antonio PA-C.  Provider Disclosure:   The documentation recorded by the scribe accurately reflects the services I personally performed and the decisions made by me.    All risks, benefits and alternatives were discussed with patient.  Patient is in agreement and understands the assessment and plan.  All questions were answered.    Ashley Antonio PA-C, [facility]  [facility] [place]: Phone: [phone], Fax: [phone]  [facility]: Phone: [phone],  Fax: [phone]  [facility]: Phone: [phone], Fax: [phone]  ____________________________________________    CC: No chief complaint on file.    HPI:  Ms. Swetha Patterson is a(n) 51 year old female who presents today as a return patient for several concerns. Last seen by myself virtually on 11/19/21 at which point she was continued on Bactrim BID for 6 months, topical clindamycin gel, and BP washes for treatment of HS. For hair loss, she was advised to start using OTC minoxidil.     Notes \"bites\" on arms and legs. Patient cares for her father who is 78yo and cannot get him to bathe, so she thinks it may be related to him. Notes she can feel herself getting bit. Notes the individual spots " itch. Mostly when they first show up ar the itchiness. Plans on getting home health care for her father.     Currently on bactrim now. Notes she still gets drainage from her abdomen. Overall doing well.     Notes she showers daily. Uses suave moisturizer.    Plans on bariatric surgery soon, working with weight management.  Recently quit smoking.     Patient is otherwise feeling well, without additional skin concerns.    Labs Reviewed:  none    Physical Exam:  Vitals: LMP 04/15/2016 (Exact Date)   SKIN: Full skin, which includes the head/face, both arms, chest, back, abdomen,both legs, genitalia and/or groin buttocks, digits and/or nails, was examined.  - decreased hair density on the frontal and mid scalp.  -scalp appears nml, no erythema, no scaling.   -negative hair pull test  -few scars and open comedones on the abdomen as well as in the bilateral axillae and R groin, no draining or active abscesses, nodules, pustules or tunnels/tracts  - There is(are) skin colored pedunculated papules with erythema on the L neck x1, chest x5.   - There are waxy stuck on white papules on the bilateral LEs.   - L forearm with a pink scaly slightly lichenified patch, similar on the R posterior auricular/neck  - No other lesions of concern on areas examined.     Medications:  Current Outpatient Medications   Medication     albuterol (PROVENTIL) (2.5 MG/3ML) 0.083% neb solution     albuterol (VENTOLIN HFA) 108 (90 Base) MCG/ACT inhaler     amLODIPine (NORVASC) 2.5 MG tablet     atorvastatin (LIPITOR) 80 MG tablet     benzoyl peroxide (ACNE-CLEAR) 10 % external gel     blood glucose (ACCU-CHEK ALLYSON PLUS) test strip     blood glucose (NO BRAND SPECIFIED) lancets standard     blood glucose (NO BRAND SPECIFIED) lancets standard     blood glucose (NO BRAND SPECIFIED) test strip     blood glucose monitoring (NO BRAND SPECIFIED) meter device kit     blood glucose monitoring (NO BRAND SPECIFIED) meter device kit     budesonide-formoterol  (SYMBICORT) 160-4.5 MCG/ACT Inhaler     buPROPion (WELLBUTRIN XL) 150 MG 24 hr tablet     cetirizine (ZYRTEC) 10 MG tablet     cetirizine HCl 10 MG CAPS     clindamycin 1 % EX external gel     clonazePAM (KLONOPIN) 0.5 MG tablet     clonazePAM (KLONOPIN) 1 MG tablet     fluticasone (FLONASE) 50 MCG/ACT nasal spray     furosemide (LASIX) 20 MG tablet     hydrocortisone 2.5 % cream     hydrOXYzine (ATARAX) 50 MG tablet     lamoTRIgine (LAMICTAL) 150 MG tablet     lamoTRIgine (LAMICTAL) 200 MG tablet     lisinopril (ZESTRIL) 20 MG tablet     metFORMIN (GLUCOPHAGE) 500 MG tablet     montelukast (SINGULAIR) 10 MG tablet     multivitamin, therapeutic (THERA-VIT) TABS tablet     nicotine (NICODERM CQ) 14 MG/24HR 24 hr patch     nicotine (NICORETTE) 2 MG gum     nitroGLYcerin (NITROSTAT) 0.4 MG sublingual tablet     nystatin (MYCOSTATIN) 898609 UNIT/GM external powder     omeprazole (PRILOSEC) 20 MG DR capsule     ondansetron (ZOFRAN) 8 MG tablet     ondansetron (ZOFRAN-ODT) 4 MG ODT tab     polyethylene glycol (MIRALAX) 17 GM/Dose powder     Semaglutide 3 MG TABS     sulfamethoxazole-trimethoprim (BACTRIM DS) 800-160 MG tablet     terbinafine (LAMISIL) 1 % external cream     Vitamin D3 (CHOLECALCIFEROL) 125 MCG (5000 UT) tablet     No current facility-administered medications for this visit.      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)     DIAZ (nonalcoholic steatohepatitis)     Status post cervical spinal fusion     Depression     Vocal cord polyp     HTN, goal below 140/90     COPD, moderate (H)     Claudication of both lower extremities (H)     PAD (peripheral artery disease) (H)     Jaw claudication     Morbid obesity with BMI of 40.0-44.9, adult (H)     Anorectal disorder     Change  "in bowel movement     Diaphragmatic hernia     Digestive symptom     Epigastric pain     Nausea     Steatosis of liver     Rectal pain     Past Medical History:   Diagnosis Date     Anxiety state, unspecified      Asthma      Chronic pain     back pain from cyst     Contact dermatitis and other eczema, due to unspecified cause      COPD (chronic obstructive pulmonary disease) (H)      Depressive disorder, not elsewhere classified      Diabetes (H)      Emphysema with chronic bronchitis (H)      Esophageal reflux      Family history of ischemic heart disease      Fibromyalgia      Gastro-oesophageal reflux disease      Heart disease     has chest pain sometimes     History of emphysema      Hoarseness      HTN, goal below 140/90      Hyperlipidemia LDL goal <130      Liver disease     \"fatty liver\"     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)      PONV (postoperative nausea and vomiting)      Rectal bleeding         CC Roro Chawla MD  [address] [place] [address] on close of this encounter.      "

## 2022-04-07 NOTE — PATIENT INSTRUCTIONS
Dove sensitive soap - fragrance free  cerave moisturizing cream (jar)  cetaphil    Stay away from scented things

## 2022-04-08 ENCOUNTER — TELEPHONE (OUTPATIENT)
Dept: DERMATOLOGY | Facility: CLINIC | Age: 52
End: 2022-04-08
Payer: COMMERCIAL

## 2022-04-08 NOTE — TELEPHONE ENCOUNTER
[facility] Call Center    Phone Message    May a detailed message be left on voicemail: yes     Reason for Call: Medication Question or concern regarding medication   Prescription Clarification  Name of Medication: minoxidil (ROGAINE) 5 % external solution and body wash Nix  Prescribing Provider: Ashley Antonio   Pharm[place] [facility] [address] [place] [address]   What on the order needs clarification? Pt needs a PA for both Rx. Pt can not afford Rx if insurance can not cover. Thanks          Action Taken: Message routed to:  [facility] Derm    Travel Screening: Not Applicable

## 2022-04-11 ENCOUNTER — TELEPHONE (OUTPATIENT)
Dept: INTERNAL MEDICINE | Facility: CLINIC | Age: 52
End: 2022-04-11
Payer: COMMERCIAL

## 2022-04-11 NOTE — TELEPHONE ENCOUNTER
PRIOR AUTHORIZATION DENIED    Medication: minoxidil (ROGAINE) 5 % external solution--DENIED    Denial Date: 4/11/2022    Denial Rational: Per insurance rep medication is excluded

## 2022-04-12 DIAGNOSIS — F41.9 ANXIETY: ICD-10-CM

## 2022-04-12 DIAGNOSIS — L30.9 ECZEMA, UNSPECIFIED TYPE: ICD-10-CM

## 2022-04-12 RX ORDER — HYDROCORTISONE 2.5 %
CREAM (GRAM) TOPICAL
Qty: 28.35 G | Refills: 1 | Status: SHIPPED | OUTPATIENT
Start: 2022-04-12 | End: 2022-05-25

## 2022-04-12 RX ORDER — HYDROXYZINE HYDROCHLORIDE 50 MG/1
TABLET, FILM COATED ORAL
Qty: 70 TABLET | Refills: 1 | Status: SHIPPED | OUTPATIENT
Start: 2022-04-12 | End: 2022-04-29

## 2022-04-13 NOTE — TELEPHONE ENCOUNTER
Ashley Antonio, PHILLIP  You 49 minutes ago (2:02 PM)     BB    Cerave cream, cetaphil cream, generics of those if they are available OTC. The triamcinolone will help itching but it will not prevent eczema. Moreso it treats the symptoms of eczema. The true treatment is daily moisturizing with a good moisturizer.     She can use any OTC brand moisturizer so long as it is a cream (comes in a jar) and does not have fragrance. Vaseline is also a good option and should be readily available and inexpensive.     Brit    Message text

## 2022-04-13 NOTE — TELEPHONE ENCOUNTER
Called and spoke with patient. States she has no money and cannot purchase over the counter rogaine or cerave. She is wondering if Ashley is able to prescribe a cream for eczema that would be covered by insurance. Patient has started using triamcinolone ointment but says it doesn't help. Routing to Ashley to advise.    Yarely Lorenzana, EMT

## 2022-04-13 NOTE — TELEPHONE ENCOUNTER
Called and spoke with patient. Offered free samples but she lives in [place] and is unable to pick them up. Patient is going to see if her financial aid program will help her pay for a moisturizer. Call back if issues    Yarely Lorenzana, EMT

## 2022-04-15 NOTE — PROGRESS NOTES
"Swetha Patterson is a 51 year old female who is being evaluated via a billable video visit.      The patient has been notified of following:     \"This video visit will be conducted via a call between you and your physician/provider. We have found that certain health care needs can be provided without the need for an in-person physical exam.  This service lets us provide the care you need with a video conversation.  If a prescription is necessary we can send it directly to your pharmacy.  If lab work is needed we can place an order for that and you can then stop by our lab to have the test done at a later time.    Video visits are billed at different rates depending on your insurance coverage.  Please reach out to your insurance provider with any questions.    If during the course of the call the physician/provider feels a video visit is not appropriate, you will not be charged for this service.\"    How would you like to obtain your AVS? MyChart  If the video visit is dropped, the invitation should be resent by: Text to cell phone: [phone]  Will anyone else be joining your video visit? No      Video-Visit Details    Type of service:  Video Visit    Video Start Time:  8:10 am  Video End Time: 8:38 am    Originating Location (pt. Location): Home    Distant Location (provider location):  [facility] [place]     Platform used for Video Visit: Kiwiple    During this virtual visit the patient is located in [place], patient verifies this as the location during the entirety of this visit.     Bariatric Nutrition Consultation Note    Reason For Visit: Nutrition Assessment    Swetha Patterson is a 51 year old presenting today for return bariatric nutrition consult.   Pt is interested in laparoscopic sleeve gastrectomy.    This is pt's 4th of 3 required nutrition visits prior to surgery.   Pt needs formal post op education once she has a surgery date.     Pt referred by NOE Blake on " "January 20, 2022.    Coordination note:   Needs Psych eval  - Scheduled for May 2nd with Dr. Diaz  Needs letter of support from therapist and psychiatrist   Needs clearance from sleep medicine - In progress  Needs PCP letter of support  Surgeon meet and greet with Dr. Haylee Leblanc nicotine for 3 months prior to surgery - Working on  Sobriety from alcohol for 1 year - May 2022     Needs baseline labs - Done 1/24/22  10 lb weight loss from initial - Met      Patient with Co-morbidities of obesity including:  Type II DM yes, last A1C 5.8 on 1/24/22  Renal Failure no  Sleep apnea yes  Hypertension yes  Dyslipidemia yes  Joint pain no  Back pain no  GERD no     PMH: fatty liver (nonalcoholic per pt), fibromyalgia, cholecystomy, asthma    Support System Reviewed With Patient 1/19/2022   Who do you have in your support network that can be available to help you for the first 2 weeks after surgery? I have my sister Rukhsana a very close friend of keri HaynesKanwalmy and I have my dad and I also have psychiatrist my sister Rukhsana Curtismy my dad and my psychiatrist again Rukhsana Milian   Who can you count on for support throughout your weight loss surgery journey? Rukhsana Rosalba Kearns my dad my psychiatrist       ANTHROPOMETRICS:  Consult weight 1/20/22: 247 lbs with BMI 43.75    Estimated body mass index is 40.99 kg/m  as calculated from the following:    Height as of 3/15/22: 1.6 m (5' 3\").    Weight as of 3/15/22: 105 kg (231 lb 6.4 oz).     Current weight: 219 lbs    Required weight loss goal pre-op: 10 lbs from initial consult weight (goal weight 237 lbs or less before surgery)       1/19/2022   I have tried the following methods to lose weight Watching portions or calories, Exercise, Physician directed program       Weight Loss Questions Reviewed With Patient 1/19/2022   How long have you been overweight? From Middle age and beyond     MEDICATIONS FOR WEIGHT LOSS:  Semaglutide     Also takes Wellbutrin    SUPPLEMENT " INFORMATION:  Vit D 5000 IUS/ MWF    Vit D WNL 1/24/22, hx of vit d def      NUTRITION HISTORY:  NKFA    Pt s friend had sleeve surgery - is familiar with some of it through her. Went well for friend.    Nicotine: Smoked for ~40+ years per pt. Quit cigarettes two weeks ago, using patch and gum. Refer to [facility]. None for 3 months prior to surgery    Alcohol: hx of abuse. Sober since May 2021. No surgery until May 2022    Please see previous RD note for more detail.    March 2022:    Discussed task list for surgery.     Doing well with dietary goals  ? Chewing food well (counting to 15-20)  ? Smaller portions   ? Continues to be alcohol free  ? Continues to drink slower   ?  fluids from meal time for most part (might have a sip here/there) - at about 20 minutes with none right now    Protein sources: pb, yogurt, cheese, chicken and turkey     Recent recall:  - Banana  - Mixed fruit  - Cheese   - 2 Tbsp pb on celery   - Vegetable Soup   - 1 slice of pepperoni pizza     PA: walking lots    April 2022:    Pt shared she has not had a BM since Friday. Thinks it is related to the Rybelsus (Semglatuide) she recently started. She is hoping to stop the medication. Scared to eat right now due to being backed up.     Didn t eat anything yesterday - today only had two pickles thus far.      Continues to do well with dietary goals and weight loss. However, pt is struggling with changes in her body and  saggy skin . Pt concerned about how she will look going forward. She is not sure how to proceed right now - hesitant but also not wanting to give up.    Has found that if she overeats she will vomit - stomach is helping her know when to stop.     Previous goals:  Continue with   ? No alcohol - Met  ? No smoking - Met  ?  fluids - Met overall  ? Sipping fluids  ? Chewing/pace        Recall Diet Questions Reviewed With Patient 1/19/2022   Describe what you typically consume for breakfast (typical or most recent): I  usually don't eat much for breakfast maybe a piece of toast.   Describe what you typically consume for lunch (typical or most recent): Sometimes hot dogs or macaroni and cheese or hamburgers or fast food or eggs or just about anything   Describe what you typically consume for supper (typical or most recent): Always have to have meat with my dinner potato vegetable if it's corn or green beans but I prefer peas and then I snack at night time before bed because I'm so hungry   Describe what you typically consume as snacks (typical or most recent): Cereal bars chips sometimes fruit a hamburger fast food   How many ounces of water, or other low calorie drinks, do you drink daily (8 oz=1 glass)? 48 oz   How many ounces of caffeine (coffee, tea, pop) do you drink daily (8 oz=1 glass)? 8 oz   How many ounces of carbonated (pop, beer, sparkling water) drinks do you drinky daily (8 oz=1 glass)? 8 oz   How many ounces of juice, pop, sweet tea, sports drinks, protein drinks, other sweetened drinks, do you drink daily (8 oz=1 glass)? 24 oz   How many ounces of milk do you drink daily (8 oz=1 glass) 8 oz   Please indicate the type of milk: 2%   How often do you drink alcohol? Never   If you do drink alcohol, how many drinks might you have in a day? (one drink = 5 oz. wine, 1 can/bottle of beer, 1 shot liquor) -       Eating Habits 1/19/2022   Do you have any dietary restrictions? Yes   Do you currently binge eat (eat a large amount of food in a short time)? -   Are you an emotional eater? No   Do you get up to eat after falling asleep? Yes   What foods do you crave? I crave Pizza macaroni and cheese [employer] hamburger french fries but I do still eat my vegetables     Dining Out History Reviewed With Patient 1/19/2022   How often do you dine out? A couple of times a week.   Where do you dine out? (select all that apply) fast food chains, take out   What types of food do you order when you dine out? Some type of meat potato and of  course you got to have a green beans or corn       Physical Activity Reviewed With Patient 2/22/2022   How often do you exercise? Daily   What is the duration of your exercise (in minutes)? 30 Minutes   What types of exercise do you do? walking, other   What keeps you from being more active?  I am as active as I can possbily be       NUTRITION DIAGNOSIS:  Obesity r/t long history of positive energy balance aeb BMI >30 kg/m2.    INTERVENTION:  Intervention Provided/Education Provided on post-op diet guidelines, vitamins/minerals essential post-operatively, GI anatomy of bariatric surgeries, ways to help prepare for post-op diet guidelines pre-operatively, portion/calorie-control, mindful eating and sources of protein.  Patient demonstrates understanding. Provided pt with list of goals RD contact information.      Questions Reviewed With Patient 1/19/2022   How ready are you to make changes regarding your weight? Number 1 = Not ready at all to make changes up to 10 = very ready. 10   How confident are you that you can change? 1 = Not confident that you will be successful making changes up to 10 = very confident. 10       Expected Engagement: good    GOALS:  1. Track protein intake for a few days (goal from last month)     Protein Sources   [email or web address]     2. Keep up the great work!    - No alcohol   - No smoking   - Seperating fluids from meal time    - Sipping fluids   - Chewing/pace      High Fiber Foods:  Bran cereal, 1/3 cup, 8.6 gm fiber   Cooked kidney beans   cup 7.9 gm  Cooked lentils   cup 7.8 gm  Cooked black beans   cup 7.6 gm  Canned chickpeas   cup 5.3 gm  Baked beans   cup 5.2 gm  Pear 1 5.1 gm  Soybeans   cup 5.1 gm  Quinoa   cup 5 gm  Baked sweet potato, with skin 1 medium 4.8 gm  Baked potato, with skin 1 medium 4.4 gm  Cooked frozen green peas   cup 4.4 gm  Bulgur   cup 4.1 gm  Cooked frozen mixed vegetables   cup 4 gm  Raspberries   cup 4 gm  Blackberries   cup 3.8 gm  Almonds 1 oz  3.5 gm  Cooked frozen spinach   cup 3.5 gm  Vegetable or soy yony 1 each 3.4 gm  Apple 1 medium 3.3 gm  Dried dates 5 pieces 3.3 gm      Surgery resources:  Diet Guidelines after Weight Loss Surgery  [email or web address]     Post-op Diet Advancement Schedule:  Clear Liquid Diet (stage 1):  TBD   *No RED, PROTEIN or PULP day before surgery  Low-Fat Full Liquid Diet (stage 2): TBD (2 weeks total between clear/liquid)  Pureed Diet (stage 3): TBD (2 weeks)  Soft Diet (stage 4): TBD (4 weeks)  Regular Diet (stage 5): TBD     Take the following after a Sleeve Gastrectomy:  - Multivitamin/minerals: adult dose 1-2 times daily  Ideally want one that provides:   5,000 - 10,000 international units vitamin A daily              800 mg oral folate daily  8 - 22 mg zinc and 1 - 2 mg copper daily  12 mg Thiamine  - Iron: 45-60 mg elemental (18-36 mg if low risk) - may partly or fully be covered in multivitamin   - Calcium Citrate containing vitamin D: 500 mg 3 times daily or 600 mg 2 times daily              - Separate the calcium from your multivitamin or iron by at least 2 hours.               - Must be a chewable calcium citrate until post-op 3 months               - Options for calcium citrate: Beni calcium citrate chewable, bariatric advantage calcium citrate chewable, Celebrate vitamins calcium citrate chewable, Bariatric Fusion calcium citrate chewable  - Vitamin D - at least 3,000 international units/day between all supplements  - Vitamin B12: sublingual form of at least 500 mcg daily or injection of 1000 mcg monthly         Weight loss resources:    Diabetes Plate Method:  https://www.diabetesfoodhub.org/articles/what-is-the-diabetes-plate-method.html    Diabetes Recipe Resources:  https://www.diabetesfoodhub.org/   https://www.cdc.gov/diabetes/library/spotlights/hack-your-snack.html   https://www.eatright.org/food/nutrition/dietary-guidelines-and-myplate/how-to-add-whole-grains-to-your-diet    https://Federal Finance.Polar/recipes   https://www.diabetes.org/healthy-living/recipes-nutrition   https://Federal Finance.com/diabetic-recipe/turkey-veggie-snacks     Carbohydrates  [email or web address]     Carbohydrate Counting  [email or web address]     Guide To Carbohydrate Counting  [email or web address]     Mindful Eating  [email or web address]      Summary of Volumetrics Eating Plan  [email or web address]        Follow up:   1 month    Time spent with patient: 28 minutes.  SHELLY Neal, RD, LD

## 2022-04-17 ENCOUNTER — HEALTH MAINTENANCE LETTER (OUTPATIENT)
Age: 52
End: 2022-04-17

## 2022-04-18 ENCOUNTER — NURSE TRIAGE (OUTPATIENT)
Dept: INTERNAL MEDICINE | Facility: CLINIC | Age: 52
End: 2022-04-18
Payer: COMMERCIAL

## 2022-04-18 NOTE — TELEPHONE ENCOUNTER
Patient calling and the medication she was put on to help lose weight is not working. Prince   Please advise  Ok to call and  [phone]

## 2022-04-19 ENCOUNTER — VIRTUAL VISIT (OUTPATIENT)
Dept: ENDOCRINOLOGY | Facility: CLINIC | Age: 52
End: 2022-04-19
Payer: COMMERCIAL

## 2022-04-19 DIAGNOSIS — E66.01 MORBID OBESITY WITH BMI OF 40.0-44.9, ADULT (H): ICD-10-CM

## 2022-04-19 DIAGNOSIS — K59.00 CONSTIPATION: ICD-10-CM

## 2022-04-19 DIAGNOSIS — Z71.3 NUTRITIONAL COUNSELING: Primary | ICD-10-CM

## 2022-04-19 PROCEDURE — 97803 MED NUTRITION INDIV SUBSEQ: CPT | Mod: GT | Performed by: DIETITIAN, REGISTERED

## 2022-04-19 NOTE — TELEPHONE ENCOUNTER
"S-(situation): Constipation    B-(background): Started Rybelsus recently and unsure if that is causing the constipation.  Has had diet change recently, eating healthy and eating more salads so she is surprised she got constipated.     A-(assessment): Last BM 4/15 medium stool, hard.  She is feeling bloated, states abdomen looks slightly distended.  She denies abdominal pain or fever, just having some low back pain.  She has tried Miralax yesterday x1 and also took \"4 poo pills\", small red tablets in a baggy that were several years old.  Apparently an OTC product but she doesn't know the name of it. Drank 6-7 bottles of water yesterday, eating very little past 2 days as she is concerned it will make her feel worse.  She has had not rectal bleeding or rectal pain.      R-(recommendations): She will try warm bath/shower and drinking hot beverages. Has not tried enema or suppositories yet but is considering unless provider has a better idea or prescription to order, ?Magnesium citrate.   SERJIO Chandra R.N.    Reason for Disposition    Abdomen is more swollen than usual    Unable to have a bowel movement (BM) without using a laxative, suppository, or enema    Mild constipation    Additional Information    Negative: Abdomen pain is the main symptom and adult male    Negative: Abdomen pain is the main symptom and adult female    Negative: Rectal bleeding or blood in stool is the main symptom    Negative: Patient sounds very sick or weak to the triager    Negative: Constant abdominal pain lasting > 2 hours    Negative: Vomiting bile (green color)    Negative: Vomiting and abdomen looks much more swollen than usual    Negative: Rectal pain or fullness from fecal impaction (rectum full of stool) and NOT better after SITZ bath, suppository or enema    Negative: Leaking stool    Negative: Intermittent mild abdominal pain and fever    Negative: Last bowel movement (BM) > 4 days ago    Negative: Unable to have a bowel movement (BM) " "without manually removing stool (using finger to pull out stool or perform disimpaction)    Negative: Constipation persists > 1 week and no improvement after using CARE ADVICE    Negative: Weight loss greater than 10 pounds (5 kg) and not dieting    Negative: Pencil-like, narrow stools    Negative: Patient wants to be seen    Negative: Uses laxative (e.g., PEG / Miralax. milk of magnesia) or enema more than once a month    Negative: Constipation is a recurrent ongoing problem (i.e., < 3 BMs / week or straining > 25% of the time)    Negative: Minor bleeding from rectum (e.g., blood just on toilet paper, few drops, streaks on surface of normal formed BM) occurs more than twice    Negative: Minor bleeding from rectum (e.g., blood just on toilet paper, few drops, streaks on surface of normal formed BM), and only 1-2 times    Negative: Rectal pain    Negative: Rectal pain or fullness from fecal impaction (rectum full of stool) has not tried a SITZ bath, suppository, or enema    Answer Assessment - Initial Assessment Questions  1. STOOL PATTERN OR FREQUENCY: \"How often do you pass bowel movements (BMs)?\"  (Normal range: tid to q 3 days)  \"When was the last BM passed?\"        Last BM 4/15, medium size and hard. Usually has a BM every day  2. STRAINING: \"Do you have to strain to have a BM?\"       Pushed on stomach and leaned over.  3. RECTAL PAIN: \"Does your rectum hurt when the stool comes out?\" If so, ask: \"Do you have hemorrhoids? How bad is the pain?\"  (Scale 1-10; or mild, moderate, severe)      none  4. STOOL COMPOSITION: \"Are the stools hard?\"       hard  5. BLOOD ON STOOLS: \"Has there been any blood on the toilet tissue or on the surface of the BM?\" If so, ask: \"When was the last time?\"       none  6. CHRONIC CONSTIPATION: \"Is this a new problem for you?\"  If no, ask: \"How long have you had this problem?\" (days, weeks, months)       5 days  7. CHANGES IN DIET: \"Have there been any recent changes in your diet?\"       " "Yes, started eating healthy and taking Rebelsus.  8. MEDICATIONS: \"Have you been taking any new medications?\"      Miralax, \"poo pills\", 4 little red pills doesn't know name  9. LAXATIVES: \"Have you been using any laxatives or enemas?\"  If yes, ask \"What, how often, and when was the last time?\"      above  10. CAUSE: \"What do you think is causing the constipation?\"         Diet change and rebelsus  11. OTHER SYMPTOMS: \"Do you have any other symptoms?\" (e.g., abdominal pain, fever, vomiting)        Denies abdominal pain, does have low back pain.  Denies fever, nausea or vomiting.  12. PREGNANCY: \"Is there any chance you are pregnant?\" \"When was your last menstrual period?\"  NA    Protocols used: CONSTIPATION-A-[place]      "

## 2022-04-19 NOTE — TELEPHONE ENCOUNTER
Advise pt to take Miralax  twice daily today and metamucil once daily,  PCP back tomorrow, if no relief by tomorrow PCP can order enema or mag citrate.

## 2022-04-19 NOTE — LETTER
"4/19/2022       RE: Swetha Patterson  [id number]  [facility] [place] [id number]     Dear Colleague,    Thank you for referring your patient, Swetha Patterson, to the [facility] [place] at [facility]. Please see a copy of my visit note below.    Swetha Patterson is a 51 year old female who is being evaluated via a billable video visit.      The patient has been notified of following:     \"This video visit will be conducted via a call between you and your physician/provider. We have found that certain health care needs can be provided without the need for an in-person physical exam.  This service lets us provide the care you need with a video conversation.  If a prescription is necessary we can send it directly to your pharmacy.  If lab work is needed we can place an order for that and you can then stop by our lab to have the test done at a later time.    Video visits are billed at different rates depending on your insurance coverage.  Please reach out to your insurance provider with any questions.    If during the course of the call the physician/provider feels a video visit is not appropriate, you will not be charged for this service.\"    How would you like to obtain your AVS? MyChart  If the video visit is dropped, the invitation should be resent by: Text to cell phone: [phone]  Will anyone else be joining your video visit? No      Video-Visit Details    Type of service:  Video Visit    Video Start Time:  8:10 am  Video End Time: 8:38 am    Originating Location (pt. Location): Home    Distant Location (provider location):  [facility] [place]     Platform used for Video Visit: Social Media Simplified    During this virtual visit the patient is located in [place], patient verifies this as the location during the entirety of this visit.     Bariatric Nutrition Consultation Note    Reason For Visit: Nutrition " "Assessment    Swetha Patterson is a 51 year old presenting today for return bariatric nutrition consult.   Pt is interested in laparoscopic sleeve gastrectomy.    This is pt's 4th of 3 required nutrition visits prior to surgery.   Pt needs formal post op education once she has a surgery date.     Pt referred by NOE Blake on January 20, 2022.    Coordination note:   Needs Psych eval  - Scheduled for May 2nd with Dr. Diaz  Needs letter of support from therapist and psychiatrist   Needs clearance from sleep medicine - In progress  Needs PCP letter of support  Surgeon meet and greet with Dr. Leach  No nicotine for 3 months prior to surgery - Working on  Sobriety from alcohol for 1 year - May 2022     Needs baseline labs - Done 1/24/22  10 lb weight loss from initial - Met      Patient with Co-morbidities of obesity including:  Type II DM yes, last A1C 5.8 on 1/24/22  Renal Failure no  Sleep apnea yes  Hypertension yes  Dyslipidemia yes  Joint pain no  Back pain no  GERD no     PMH: fatty liver (nonalcoholic per pt), fibromyalgia, cholecystomy, asthma    Support System Reviewed With Patient 1/19/2022   Who do you have in your support network that can be available to help you for the first 2 weeks after surgery? I have my sister Rukhsana a very close friend of keri Mckinley and I have my dad and I also have psychiatrist my sister Rukhsana Kearns my dad and my psychiatrist again Rukhsana Milian   Who can you count on for support throughout your weight loss surgery journey? Rukhsana Haynesnda Urmila my dad my psychiatrist       ANTHROPOMETRICS:  Consult weight 1/20/22: 247 lbs with BMI 43.75    Estimated body mass index is 40.99 kg/m  as calculated from the following:    Height as of 3/15/22: 1.6 m (5' 3\").    Weight as of 3/15/22: 105 kg (231 lb 6.4 oz).     Current weight: 219 lbs    Required weight loss goal pre-op: 10 lbs from initial consult weight (goal weight 237 lbs or less before surgery)       " 1/19/2022   I have tried the following methods to lose weight Watching portions or calories, Exercise, Physician directed program       Weight Loss Questions Reviewed With Patient 1/19/2022   How long have you been overweight? From Middle age and beyond     MEDICATIONS FOR WEIGHT LOSS:  Semaglutide     Also takes Wellbutrin    SUPPLEMENT INFORMATION:  Vit D 5000 IUS/ MWF    Vit D WNL 1/24/22, hx of vit d def      NUTRITION HISTORY:  NKFA    Pt s friend had sleeve surgery - is familiar with some of it through her. Went well for friend.    Nicotine: Smoked for ~40+ years per pt. Quit cigarettes two weeks ago, using patch and gum. Refer to MTM. None for 3 months prior to surgery    Alcohol: hx of abuse. Sober since May 2021. No surgery until May 2022    Please see previous RD note for more detail.    March 2022:    Discussed task list for surgery.     Doing well with dietary goals  ? Chewing food well (counting to 15-20)  ? Smaller portions   ? Continues to be alcohol free  ? Continues to drink slower   ?  fluids from meal time for most part (might have a sip here/there) - at about 20 minutes with none right now    Protein sources: pb, yogurt, cheese, chicken and turkey     Recent recall:  - Banana  - Mixed fruit  - Cheese   - 2 Tbsp pb on celery   - Vegetable Soup   - 1 slice of pepperoni pizza     PA: walking lots    April 2022:    Pt shared she has not had a BM since Friday. Thinks it is related to the Rybelsus (Semglatuide) she recently started. She is hoping to stop the medication. Scared to eat right now due to being backed up.     Didn t eat anything yesterday - today only had two pickles thus far.      Continues to do well with dietary goals and weight loss. However, pt is struggling with changes in her body and  saggy skin . Pt concerned about how she will look going forward. She is not sure how to proceed right now - hesitant but also not wanting to give up.    Has found that if she overeats she  will vomit - stomach is helping her know when to stop.     Previous goals:  Continue with   ? No alcohol - Met  ? No smoking - Met  ?  fluids - Met overall  ? Sipping fluids  ? Chewing/pace        Recall Diet Questions Reviewed With Patient 1/19/2022   Describe what you typically consume for breakfast (typical or most recent): I usually don't eat much for breakfast maybe a piece of toast.   Describe what you typically consume for lunch (typical or most recent): Sometimes hot dogs or macaroni and cheese or hamburgers or fast food or eggs or just about anything   Describe what you typically consume for supper (typical or most recent): Always have to have meat with my dinner potato vegetable if it's corn or green beans but I prefer peas and then I snack at night time before bed because I'm so hungry   Describe what you typically consume as snacks (typical or most recent): Cereal bars chips sometimes fruit a hamburger fast food   How many ounces of water, or other low calorie drinks, do you drink daily (8 oz=1 glass)? 48 oz   How many ounces of caffeine (coffee, tea, pop) do you drink daily (8 oz=1 glass)? 8 oz   How many ounces of carbonated (pop, beer, sparkling water) drinks do you drinky daily (8 oz=1 glass)? 8 oz   How many ounces of juice, pop, sweet tea, sports drinks, protein drinks, other sweetened drinks, do you drink daily (8 oz=1 glass)? 24 oz   How many ounces of milk do you drink daily (8 oz=1 glass) 8 oz   Please indicate the type of milk: 2%   How often do you drink alcohol? Never   If you do drink alcohol, how many drinks might you have in a day? (one drink = 5 oz. wine, 1 can/bottle of beer, 1 shot liquor) -       Eating Habits 1/19/2022   Do you have any dietary restrictions? Yes   Do you currently binge eat (eat a large amount of food in a short time)? -   Are you an emotional eater? No   Do you get up to eat after falling asleep? Yes   What foods do you crave? I crave Pizza macaroni and  cheese [employer] hamburger french fries but I do still eat my vegetables     Dining Out History Reviewed With Patient 1/19/2022   How often do you dine out? A couple of times a week.   Where do you dine out? (select all that apply) fast food chains, take out   What types of food do you order when you dine out? Some type of meat potato and of course you got to have a green beans or corn       Physical Activity Reviewed With Patient 2/22/2022   How often do you exercise? Daily   What is the duration of your exercise (in minutes)? 30 Minutes   What types of exercise do you do? walking, other   What keeps you from being more active?  I am as active as I can possbily be       NUTRITION DIAGNOSIS:  Obesity r/t long history of positive energy balance aeb BMI >30 kg/m2.    INTERVENTION:  Intervention Provided/Education Provided on post-op diet guidelines, vitamins/minerals essential post-operatively, GI anatomy of bariatric surgeries, ways to help prepare for post-op diet guidelines pre-operatively, portion/calorie-control, mindful eating and sources of protein.  Patient demonstrates understanding. Provided pt with list of goals RD contact information.      Questions Reviewed With Patient 1/19/2022   How ready are you to make changes regarding your weight? Number 1 = Not ready at all to make changes up to 10 = very ready. 10   How confident are you that you can change? 1 = Not confident that you will be successful making changes up to 10 = very confident. 10       Expected Engagement: good    GOALS:  1. Track protein intake for a few days (goal from last month)     Protein Sources   [email or web address]     2. Keep up the great work!    - No alcohol   - No smoking   - Seperating fluids from meal time    - Sipping fluids   - Chewing/pace      High Fiber Foods:  Bran cereal, 1/3 cup, 8.6 gm fiber   Cooked kidney beans   cup 7.9 gm  Cooked lentils   cup 7.8 gm  Cooked black beans   cup 7.6 gm  Canned chickpeas   cup 5.3  gm  Baked beans   cup 5.2 gm  Pear 1 5.1 gm  Soybeans   cup 5.1 gm  Quinoa   cup 5 gm  Baked sweet potato, with skin 1 medium 4.8 gm  Baked potato, with skin 1 medium 4.4 gm  Cooked frozen green peas   cup 4.4 gm  Bulgur   cup 4.1 gm  Cooked frozen mixed vegetables   cup 4 gm  Raspberries   cup 4 gm  Blackberries   cup 3.8 gm  Almonds 1 oz 3.5 gm  Cooked frozen spinach   cup 3.5 gm  Vegetable or soy yony 1 each 3.4 gm  Apple 1 medium 3.3 gm  Dried dates 5 pieces 3.3 gm      Surgery resources:  Diet Guidelines after Weight Loss Surgery  [email or web address]     Post-op Diet Advancement Schedule:  Clear Liquid Diet (stage 1):  TBD   *No RED, PROTEIN or PULP day before surgery  Low-Fat Full Liquid Diet (stage 2): TBD (2 weeks total between clear/liquid)  Pureed Diet (stage 3): TBD (2 weeks)  Soft Diet (stage 4): TBD (4 weeks)  Regular Diet (stage 5): TBD     Take the following after a Sleeve Gastrectomy:  - Multivitamin/minerals: adult dose 1-2 times daily  Ideally want one that provides:   5,000 - 10,000 international units vitamin A daily              800 mg oral folate daily  8 - 22 mg zinc and 1 - 2 mg copper daily  12 mg Thiamine  - Iron: 45-60 mg elemental (18-36 mg if low risk) - may partly or fully be covered in multivitamin   - Calcium Citrate containing vitamin D: 500 mg 3 times daily or 600 mg 2 times daily              - Separate the calcium from your multivitamin or iron by at least 2 hours.               - Must be a chewable calcium citrate until post-op 3 months               - Options for calcium citrate: Beni calcium citrate chewable, bariatric advantage calcium citrate chewable, Celebrate vitamins calcium citrate chewable, Bariatric Fusion calcium citrate chewable  - Vitamin D - at least 3,000 international units/day between all supplements  - Vitamin B12: sublingual form of at least 500 mcg daily or injection of 1000 mcg monthly         Weight loss resources:    Diabetes Plate  Method:  https://www.diabetesfoodhub.org/articles/what-is-the-diabetes-plate-method.html    Diabetes Recipe Resources:  https://www.diabetesfoodhub.org/   https://www.cdc.gov/diabetes/library/spotlights/hack-your-snack.html   https://www.eatright.org/food/nutrition/dietary-guidelines-and-myplate/how-to-add-whole-grains-to-your-diet   https://Medprex/recipes   https://www.diabetes.org/healthy-living/recipes-nutrition   https://[employer] (PGD)/diabetic-recipe/turkey-veggie-snacks     Carbohydrates  [email or web address]     Carbohydrate Counting  [email or web address]     Guide To Carbohydrate Counting  [email or web address]     Mindful Eating  [email or web address]      Summary of Volumetrics Eating Plan  [email or web address]        Follow up:   1 month    Time spent with patient: 28 minutes.  SHELLY Neal, RD, LD

## 2022-04-19 NOTE — TELEPHONE ENCOUNTER
Left detailed voicemail message for patient with Dr. Regan's response.  Will leave encounter open in case patient calls back tomorrow with no improvement.  SERJIO Chandra R.N.

## 2022-04-19 NOTE — TELEPHONE ENCOUNTER
Patient calling. She has not had a bowel movement in 4 days and she thinks its this new medication she is on. Please advise. Ok to call and nettie [phone]

## 2022-04-20 ENCOUNTER — TELEPHONE (OUTPATIENT)
Dept: ENDOCRINOLOGY | Facility: CLINIC | Age: 52
End: 2022-04-20
Payer: COMMERCIAL

## 2022-04-22 ENCOUNTER — TELEPHONE (OUTPATIENT)
Dept: PULMONOLOGY | Facility: CLINIC | Age: 52
End: 2022-04-22
Payer: COMMERCIAL

## 2022-04-22 NOTE — TELEPHONE ENCOUNTER
Someone who is not Sarah answered the phone, but I did not provide any information about who I was, where I was calling from, or why I was calling per permanent comments. This individual stated that Sarah was not there at the moment.

## 2022-04-22 NOTE — TELEPHONE ENCOUNTER
Patient called me back and scheduled a 6 month follow up with Dr. Tolentino on 9/2/22 per Dr. Madison's note on 3/2/22. Patient agreeable to this time and date and agreeable to seeing a new provider. Details of appointment confirmed with patient. Patient had a question about a letter of clearance from Dr. Madison. I informed patient that I would send Dr. Madison a message about this and have someone reach out to her regarding the letter that she needs. Message sent to Dr. Madison.

## 2022-04-25 ENCOUNTER — TELEPHONE (OUTPATIENT)
Dept: INTERNAL MEDICINE | Facility: CLINIC | Age: 52
End: 2022-04-25
Payer: COMMERCIAL

## 2022-04-25 NOTE — TELEPHONE ENCOUNTER
Patient calls to say the rx rybelsus 3mg  is not producing anymore weight loss. She is staying at [facility]. She stated she is on this from Dr Low but writer does not see it on the med list.     She is asking for an increase in mg to assist in weight loss and to avoid bariatric surgery.

## 2022-04-26 DIAGNOSIS — J30.89 DUST ALLERGY: ICD-10-CM

## 2022-04-26 DIAGNOSIS — F41.9 ANXIETY: ICD-10-CM

## 2022-04-26 DIAGNOSIS — J44.9 COPD, MODERATE (H): ICD-10-CM

## 2022-04-26 NOTE — PROGRESS NOTES
During this virtual visit the patient is located in [place], patient verifies this as the location during the entirety of this visit.     Swetha is a 51 year old who is being evaluated via a billable video visit.      How would you like to obtain your AVS? MyChart  If the video visit is dropped, the invitation should be resent by: Text to cell phone:  [phone]  Will anyone else be joining your video visit? No    Video Start Time: 2:07 PM    Video-Visit Details    Type of service:  Video Visit    Video End Time:2:32 PM    Originating Location (pt. Location): Home    Distant Location (provider location):  [facility] [place]     Platform used for Video Visit: Michael Nunn NREMT      30 minutes spent on the date of the encounter doing chart review, history and exam, documentation and further activities per the note    Return Medical Weight Management Note     Swetha Patterson  MRN:  [id number]  :  1970  SHERINE:  2022    Dear Roro Chawla MD,    I had the pleasure of seeing your patient Swetha Patterson. She is a 51 year old female who I am continuing to see for treatment of obesity related to:       2022   I have the following health issues associated with obesity: Type II Diabetes, High Blood Pressure, High Cholesterol, Sleep Apnea, GERD (Reflux), Fatty Liver, Asthma       Assessment & Plan   Problem List Items Addressed This Visit        Endocrine Diagnoses    Class 3 severe obesity due to excess calories with serious comorbidity and body mass index (BMI) of 40.0 to 44.9 in adult (H)       Other    DIAZ (nonalcoholic steatohepatitis) (Chronic)      Other Visit Diagnoses     Tobacco abuse    -  Primary    Relevant Orders    Nicotine and Mets, Urn, Quant           INTERVAL HISTORY:  Pre bariatric surgery. NBS consult with me 2022  Referred by GI who she sees for delayed gastric emptying, nausea, GERD, hiatal hernia  DIAZ,  Fibroscan with Thuy Mobley NP  Quit smoking Feb 2022, still uses occasional patch.  Quit drinking May 2021  Sleep study scheduled July 2022  Psych May 2nd   Needs letter from psychiatrist  Needs to meet Dr Leach after psych eval    Taking oral semuglutide since 1 month ago, prescribed by PCP  - fasting, improved from 150's  Still taking metformin, off glipizide    PLAN:  Possible sleeve August 2022  Continue oral semaglutide, no more weight loss so far but has helped with maintaining her weight loss and eating changes.  It is covered and she is tolerating it well.  Psych eval pending 5/22/22 Marcie Holker  Nicotine test needed, order placed today  Letter from psychiatrist needed  GERD symptoms resolved with weight loss and diet changes  Plan to see Dr Leach to discuss sleeve gastrectomy after psych eval , smoking cessation and letter from psychiatrist      Letter from pulmonary 3/2/22: Dr Madison in Epic  Naya-operative Plan  - no barriers from our standpoint for proceeding with surgery  - her Trelegy inhaler should be continued during the hospitalization  - if unable to use Trelegy (ie intubated) then use BID budesonide nebs and QID duonebs  - if able to use Trelegy then use TID albuterol throughout her hospitalization  - use airway clearance of some form to be done by respiratory therapist TID (ie Acapella flutter valve or metanebs)  - use incentive spirometer every hour, up in chair as much as possible, etc.     Cardiology: 3/15/22 Lesley Morales PA-C  ASSESSMENT/PLAN:  51-year-old female with morbid obesity, peripheral artery disease, COPD, emphysema, GERD, hypertension.        She has normal LVEF, no anginal discomfort. She recently underwent a stress test which showed no evidence of inducible ischemia with normal LV function.      She has quit smoking.      She has lost about 17 lbs with lifestyle changes.      She is to undergo bariatric surgery in May 2022 (sleeve).      If she is to undergo  intraperitoneal surgery (I am not sure how they perform the surgery), this is a high risk surgery but without any cardiac concerns, she would be at low risk for periprocedural complications.   She is also seeing pulm re preop evaluation.     Lesley Morales PA-C      CURRENT WEIGHT:   220 lbs 0 oz    Initial Weight (lbs): 247 lbs  Last Visits Weight: 105.6 kg (232 lb 11.2 oz)  Cumulative weight loss (lbs): 27  Weight Loss Percentage: 10.93%    Changes and Difficulties 4/24/2022   I have made the following changes to my diet since my last visit: Eating in smaller portions  more veggies  and some times turkey meat low fat or no fat.   With regards to my diet, I am still struggling with: Wanting to drink pop or just some of my old food.   I have made the following changes to my activity/exercise since my last visit: Enjoy my walks and being able to tie my shoes and walk up and down stairs ..   With regards to my activity/exercise, I am still struggling with: Sore hipps i do stop once in a whilerybelsus         MEDICATIONS:   Current Outpatient Medications   Medication Sig Dispense Refill     albuterol (PROVENTIL) (2.5 MG/3ML) 0.083% neb solution Take 1 vial (2.5 mg) by nebulization every 6 hours as needed for shortness of breath / dyspnea or wheezing 25 vial 3     albuterol (VENTOLIN HFA) 108 (90 Base) MCG/ACT inhaler Inhale 2 puffs into the lungs every 6 hours 3 each 3     amLODIPine (NORVASC) 2.5 MG tablet Take 2 tablets (5 mg) by mouth daily 180 tablet 2     atorvastatin (LIPITOR) 80 MG tablet TAKE ONE TABLET BY MOUTH EVERY DAY 90 tablet 3     benzoyl peroxide (ACNE-CLEAR) 10 % external gel Apply topically 2 times daily as needed 90 g 1     blood glucose (ACCU-CHEK ALLYSON PLUS) test strip USE TO TEST BLOOD SUGAR ONCE DAILY OR AS DIRECTED 100 strip 3     blood glucose (NO BRAND SPECIFIED) lancets standard Use to test blood sugar 1 times daily or as directed. 100 each 3     blood glucose (NO BRAND SPECIFIED) lancets  standard Use to test blood sugar 1 time daily 100 each 1     blood glucose (NO BRAND SPECIFIED) test strip Use to test blood sugar 1 times daily or as directed.  Dispense Accu-chek Olinda Plus or per patient insurance 100 strip 3     blood glucose monitoring (NO BRAND SPECIFIED) meter device kit Use to test blood sugar 1 times daily or as directed.  Dispense Accu-chek Olinda Plus or per insurance preference 1 kit 0     blood glucose monitoring (NO BRAND SPECIFIED) meter device kit Use to test blood sugar 1 times daily or as directed. 1 kit 0     budesonide-formoterol (SYMBICORT) 160-4.5 MCG/ACT Inhaler Inhale 2 puffs into the lungs 2 times daily 30.6 g 3     buPROPion (WELLBUTRIN XL) 150 MG 24 hr tablet TAKE ONE TABLET BY MOUTH EVERY MORNING 90 tablet 2     cetirizine (ZYRTEC) 10 MG tablet TAKE ONE TABLET BY MOUTH EVERY DAY AS NEEDED 90 tablet 3     cetirizine HCl 10 MG CAPS Take 1 capsule (10 mg) by mouth daily as needed Takes in the spring. 90 capsule 3     clindamycin (CLINDAMAX) 1 % external gel Apply topically 2 times daily 60 g 3     clonazePAM (KLONOPIN) 0.5 MG tablet Take 0.5 mg by mouth daily as needed        clonazePAM (KLONOPIN) 1 MG tablet Take 1 tablet (1 mg) by mouth 2 times daily as needed for anxiety She needs to see her PCP to get more tablets 5 tablet 0     Emollient (CERAVE MOISTURIZING) CREA Externally apply 1 Application topically 2 times daily 453 g 11     fluticasone (FLONASE) 50 MCG/ACT nasal spray Spray 2 sprays in nostril daily (Patient taking differently: Spray 2 sprays in nostril daily as needed ) 16 g 5     furosemide (LASIX) 20 MG tablet TAKE ONE TABLET BY MOUTH TWICE A  tablet 3     hydrocortisone 2.5 % cream APPLY TOPICALLY TO THE AFFECTED AREA(S) TWO TIMES A DAY 28.35 g 1     hydrOXYzine (ATARAX) 50 MG tablet TAKE TWO TABLETS BY MOUTH THREE TIMES A DAY AS NEEDED FOR ANXIETY 70 tablet 1     lamoTRIgine (LAMICTAL) 150 MG tablet Take 150 mg by mouth daily       lamoTRIgine  (LAMICTAL) 200 MG tablet Take 200 mg by mouth daily       lisinopril (ZESTRIL) 20 MG tablet TAKE ONE TABLET BY MOUTH EVERY DAY 90 tablet 0     metFORMIN (GLUCOPHAGE) 500 MG tablet TAKE ONE TABLET BY MOUTH EVERY DAY WITH BREAKFAST 90 tablet 1     minoxidil (ROGAINE) 5 % external solution apply per package directions to the scalp once daily 120 mL 3     montelukast (SINGULAIR) 10 MG tablet TAKE ONE TABLET BY MOUTH AT BEDTIME 90 tablet 0     multivitamin, therapeutic (THERA-VIT) TABS tablet Take 1 tablet by mouth daily       nicotine (NICODERM CQ) 14 MG/24HR 24 hr patch Place 1 patch onto the skin every 24 hours 28 patch 1     nicotine (NICORETTE) 2 MG gum Place 1 each (2 mg) inside cheek every hour as needed for smoking cessation 240 each 1     nitroGLYcerin (NITROSTAT) 0.4 MG sublingual tablet PLACE ONE TABLET UNDER THE TONGUE AT THE 1ST SIGN OF ATTACK. IF PAIN IS UNRELIEVED OR WORSENED 5 MINUTES AFTER 1ST DOSE, PROMPT MEDICAL ASSISTANCE IS NEEDED. MAY REPEAT EVERY 5 MINUTES UNTIL PAIN IS RELIEVED. MAX OF THREE DOSES 25 tablet 11     nystatin (MYCOSTATIN) 521988 UNIT/GM external powder APPLY TOPICALLY TWICE A DAY AS NEEDED SKIN RASH 45 g 3     omeprazole (PRILOSEC) 20 MG DR capsule TAKE ONE CAPSULE BY MOUTH TWO TIMES A  capsule 1     ondansetron (ZOFRAN) 8 MG tablet TAKE ONE-HALF TO ONE TABLET BY MOUTH EVERY 8 HOURS AS NEEDED FOR NAUSEA 30 tablet 1     ondansetron (ZOFRAN-ODT) 4 MG ODT tab Take 1 tablet (4 mg) by mouth every 6 hours as needed for nausea 12 tablet 1     polyethylene glycol (MIRALAX) 17 GM/Dose powder Take 17 g (1 capful) by mouth daily 507 g 1     Semaglutide 3 MG TABS Take 3 mg by mouth daily 30 tablet 1     sulfamethoxazole-trimethoprim (BACTRIM DS) 800-160 MG tablet Take 1 tablet by mouth 2 times daily Take one tablet twice daily. For additional refills, please schedule a follow-up appointment. 180 tablet 1     terbinafine (LAMISIL) 1 % external cream APPLY TO AFFECTED AREA(S) TWO TIMES A  DAY 30 g 2     triamcinolone (KENALOG) 0.025 % external ointment Apply topically 2 times daily 80 g 1     Vitamin D3 (CHOLECALCIFEROL) 125 MCG (5000 UT) tablet Take 1 tablet by mouth three times a week         Weight Loss Medication History Reviewed With Patient 4/24/2022   Which weight loss medications are you currently taking on a regular basis?  None   If you are not taking a weight loss medication that was prescribed to you, please indicate why: Other   Are you having any side effects from the weight loss medication that we have prescribed you? No   If you are having side effects please describe: None       Lab on 01/24/2022   Component Date Value Ref Range Status     WBC Count 01/24/2022 6.7  4.0 - 11.0 10e3/uL Final     RBC Count 01/24/2022 5.12  3.80 - 5.20 10e6/uL Final     Hemoglobin 01/24/2022 13.9  11.7 - 15.7 g/dL Final     Hematocrit 01/24/2022 44.5  35.0 - 47.0 % Final     MCV 01/24/2022 87  78 - 100 fL Final     MCH 01/24/2022 27.1  26.5 - 33.0 pg Final     MCHC 01/24/2022 31.2 (A) 31.5 - 36.5 g/dL Final     RDW 01/24/2022 16.2 (A) 10.0 - 15.0 % Final     Platelet Count 01/24/2022 379  150 - 450 10e3/uL Final     Sodium 01/24/2022 135  133 - 144 mmol/L Final     Potassium 01/24/2022 4.0  3.4 - 5.3 mmol/L Final     Chloride 01/24/2022 104  94 - 109 mmol/L Final     Carbon Dioxide (CO2) 01/24/2022 26  20 - 32 mmol/L Final     Anion Gap 01/24/2022 5  3 - 14 mmol/L Final     Urea Nitrogen 01/24/2022 16  7 - 30 mg/dL Final     Creatinine 01/24/2022 0.85  0.52 - 1.04 mg/dL Final     Calcium 01/24/2022 9.6  8.5 - 10.1 mg/dL Final     Glucose 01/24/2022 126 (A) 70 - 99 mg/dL Final     Alkaline Phosphatase 01/24/2022 156 (A) 40 - 150 U/L Final     AST 01/24/2022 47 (A) 0 - 45 U/L Final     ALT 01/24/2022 89 (A) 0 - 50 U/L Final     Protein Total 01/24/2022 7.7  6.8 - 8.8 g/dL Final     Albumin 01/24/2022 3.6  3.4 - 5.0 g/dL Final     Bilirubin Total 01/24/2022 0.4  0.2 - 1.3 mg/dL Final     GFR Estimate  01/24/2022 82  >60 mL/min/1.73m2 Final    Effective December 21, 2021 eGFRcr in adults is calculated using the 2021 CKD-EPI creatinine equation which includes age and gender (Tanesha et al., NE, DOI: 10.1056/TUSUyx6008577)     Hemoglobin A1C 01/24/2022 5.8 (A) 0.0 - 5.6 % Final    Normal <5.7%   Prediabetes 5.7-6.4%    Diabetes 6.5% or higher     Note: Adopted from ADA consensus guidelines.     Vitamin D, Total (25-Hydroxy) 01/24/2022 54  20 - 75 ug/L Final     Parathyroid Hormone Intact 01/24/2022 23  pg/mL Final    : 8.7-79.6 pg/mL  : 12-64 pg/mL  Other races - Normal range is not specified     Cholesterol 01/24/2022 167  <200 mg/dL Final     Triglycerides 01/24/2022 227 (A) <150 mg/dL Final     Direct Measure HDL 01/24/2022 42 (A) >=50 mg/dL Final     LDL Cholesterol Calculated 01/24/2022 80  <=100 mg/dL Final     Non HDL Cholesterol 01/24/2022 125  <130 mg/dL Final     Patient Fasting > 8hrs? 01/24/2022 Yes   Final     EGD 6/17/21    Impression:               - Tortuous distal esophagus.                             - Mildly severe reflux esophagitis in the distal                             esophagus (c/w LA grade A). Biopsied.                             - Small sliding type hiatal hernia.                             - Gastroesophageal flap valve classified as Hill                             Grade III (minimal fold, loose to endoscope, hiatal                             hernia likely).                             - Non-bleeding erosive gastropathy. Biopsied.                             - Erythematous mucosa in the gastric body. Biopsied.                             - Normal examined duodenum.   Recommendation:           - Await pathology results.                             - Return to referring physician after studies are                             complete.                             - Weight loss.                             - Continue omeprazole.    PHYSICAL EXAM:  Objective    Ht 1.6 m (5'  "2.99\")   Wt 99.8 kg (220 lb)   LMP 04/15/2016 (Exact Date)   BMI 38.98 kg/m             Vitals:  No vitals were obtained today due to virtual visit.    GENERAL: Healthy, alert and no distress  EYES: Eyes grossly normal to inspection.  No discharge or erythema, or obvious scleral/conjunctival abnormalities.  RESP: No audible wheeze, cough, or visible cyanosis.  No visible retractions or increased work of breathing.    SKIN: Visible skin clear. No significant rash, abnormal pigmentation or lesions.  NEURO: Cranial nerves grossly intact.  Mentation and speech appropriate for age.  PSYCH: Mentation appears normal, affect normal/bright, judgement and insight intact, normal speech and appearance well-groomed.        Sincerely,    Rashmi Del Rio PA-C  "

## 2022-04-26 NOTE — TELEPHONE ENCOUNTER
Plan:  1. Semaglutide 3 mg daily -- for diabetes and obesity    If it is not covered, patient needs to call insurance and find out which equivalent is covered.    2. Follow up video in 3 weeks  3. Letter for the Wt loss clinic

## 2022-04-26 NOTE — TELEPHONE ENCOUNTER
Call to patient. Patient informed of need for VV with Dr. Low. Appointment scheduled.     Appointments in Next Year       May 05, 2022  7:30 AM  (Arrive by 7:10 AM)  Provider Visit with Roro Chawla MD  [facility] ([facility] ) [phone]       Jessica HALL RN   [facility]

## 2022-04-26 NOTE — TELEPHONE ENCOUNTER
"Please see message below and advise.     From 3/31/2022 virtual visit with Dr. Low:   \"Plan:  1. Semaglutide 3 mg daily -- for diabetes and obesity    If it is not covered, patient needs to call insurance and find out which equivalent is covered.    2. Follow up video in 3 weeks  3. Letter for the Wt loss clinic    Obesity with comorbidity wants bariatric surgery. I am supporting her decision.  The surgery is scheduled for May. She was able to loose 27 lbs w diet and exercise.  I thought it was in her interest to try Semaglutide for more weight control.\"      Patient taking Semaglutide 3 MG TABS. Take 3 mg by mouth daily - Oral.     Should patient scheduled VV with provider or is provider able to make dose adjustment?     Pharmacy updated.       Jessica HALL RN   [facility]          "

## 2022-04-27 ENCOUNTER — VIRTUAL VISIT (OUTPATIENT)
Dept: ENDOCRINOLOGY | Facility: CLINIC | Age: 52
End: 2022-04-27
Payer: COMMERCIAL

## 2022-04-27 VITALS — HEIGHT: 63 IN | WEIGHT: 220 LBS | BODY MASS INDEX: 38.98 KG/M2

## 2022-04-27 DIAGNOSIS — K75.81 NASH (NONALCOHOLIC STEATOHEPATITIS): Chronic | ICD-10-CM

## 2022-04-27 DIAGNOSIS — Z72.0 TOBACCO ABUSE: Primary | ICD-10-CM

## 2022-04-27 DIAGNOSIS — E66.813 CLASS 3 SEVERE OBESITY DUE TO EXCESS CALORIES WITH SERIOUS COMORBIDITY AND BODY MASS INDEX (BMI) OF 40.0 TO 44.9 IN ADULT (H): ICD-10-CM

## 2022-04-27 DIAGNOSIS — E66.01 CLASS 3 SEVERE OBESITY DUE TO EXCESS CALORIES WITH SERIOUS COMORBIDITY AND BODY MASS INDEX (BMI) OF 40.0 TO 44.9 IN ADULT (H): ICD-10-CM

## 2022-04-27 PROCEDURE — 99214 OFFICE O/P EST MOD 30 MIN: CPT | Mod: GT | Performed by: PHYSICIAN ASSISTANT

## 2022-04-27 NOTE — PATIENT INSTRUCTIONS
Bariatric Task List    Fax:  Please fax all paperwork to: [phone] -     Status:  Is patient a candidate for bariatric surgery?:  patient is not a candidate for bariatric surgery - 10/22/21  Referral from Lisa Harris DO for weight loss. bks   Cleared to schedule surgeon consult?:    - See Dr Leach to discuss sleeve after psych eval, smoking cessation, and ltr from mental health provider(s) done   Status:  surgery evaluation in process -     Surgeon: Dr Rogers Leach -     Tentative surgery month/year: To be determined if a candidate for surgery after clearances. -        Insurance: Insurance:  [employer] -      Contact insurance to discuss coverage: Needed -       [employer]: PCP Recommendation and Medical Clearance:    -     HP Referral:    -      Advanced beneficiary notification (ABN) for Medicare patients for RD visits   and surgery:   -      Weight history:   -     Other:    -        Patient Info: Initial Weight:  247 -     Date of Initial Weight/Height:  1/20/2022 -     Goal Weight (lbs):  237 -     Required Weight Loss:  10 -     Surgery Type:  sleeve gastrectomy -     Multidisciplinary Meeting:    -        Dietician Visits: Structured weight loss required by insurance?:  structured weight loss required -     Dietician Visit 1:  Completed - 1/20/22 in Epic. s   Dietician Visit 2:  Completed - 2/18/22 in Epic. s   Dietician Visit 3:  Completed - 3/18/22 appt. s   Dietician Visit 4:    -     Dietician Visit 5:    -     Dietician Visit 6:    -     Dietician Visit additional:  Needed - Monthly until surgery for further weight loss and postop diet teaching. bks   Clearance from dietician to see surgeon?:    -     Dietician Notes:    -        Psychological Evaluation: Psych eval:  Needed - 5/9/22 Dr Diaz appt. bks   Therapist letter of support:    - Data deleted   Psychiatrist letter of support:  Needed - Needed if you are seeing a psychiatrist. [facility]   Establish care with therapist:    -     Complete  eating disorder evaluation:    -     Letter of clearance from therapist/eating disorder program:    -     Other:  It helps to have the letters of support from your mental health providers before your Dr Joe tapia [facility] -        Lab Work: Complete Blood Count:  Completed - 1/24/22 in [facility]. bks   Comprehensive Metabolic Panel:  Completed - 1/24/22 in Epic. bks   Vitamin D:  Completed - 1/24/22 in Epic. bks   PTH:  Completed - 1/24/22 in Epic. bks   Hgb A1c:  Completed - 1/24/22 in Epic. bks    Lipids: Completed - 1/24/22 in [facility]. bks    TSH ([facility], [facility], [place]):   -       Ferritin:   -       Folate:   -       Testosterone, Total and Free:   -     Thiamine:   -     Vitamin A:   -     Vitamin B12:   -     Zinc:   -     C-peptide:   -     H. pylori:    -     MRSA (2 swabs, minimum 48 hours apart):   -     Nicotine Testing:    -     Recheck Vitamin D:   -     Other:    -        Consults/ Clearance Sleep Medicine:  Needed - 3/25/22 Debra carlin [facility]. sleep study July 2022   Cardiac:  Completed -     Pain:   -     Dental:    -     Endocrine:    -     Gastroenterology:    -     Vascular Medicine:    -     Hematology:    -     Medical Weight Management:   -     Physical Therapy/Exercise:    -     Nephrology:    -     Neurology:    -     Pulmonology:  Completed - (COPD)   Rheumatology:    -     Other:    -     Other:    -     Other:    -        Testing: UGI:    -     EGD:    -     Sleep Study:   -     Other:   -     Other:    -        PCP: Establish care with PCP:    -     Follow up with PCP:    -     PCP letter of support:  Completed - Dr Chawla 3/31/22 in epic      Stopping Smoking/ Alcohol Use: Quit tobacco use (3 months smoke free)?:  Completed - Check nicotine level 1 month after stopping all nicotine products. [facility]   Quit date:  2/15/2022 - What is your official quit date?    Quit alcohol use:   -     Quit date:   -     Other:   -     Quit date:   -        Patient Education:  Information Session:  Completed  "- Let us know the date you view the on-line Seminar. bks   Given \"Making your decision\" handout?:  Yes -     Given \"A Roadmap to you Weight Loss Surgery\" handout?: Yes -     Given \"Get Well Loop\" information?: Yes -     Given support group information?:    -     Attended support group?:  Needed -     Support plan in place?:  Completed - Family and friends. bks   Research consents signed?:    -     Avoid NSAIDS/ Alternate Plan for Pain:   -        Additional Surgery Requirements: Review Coag plan:    -     HgA1c <8:    -     Inpatient pain consult:    -     Final nicotine screen:    -     Dental work complete:    -     Birth control plan:    -     Gallstone prevention plan (Actigall for 6 months postop):   -     Other:   -     Other:   -        Final Tasks:  Before surgery online class:  Needed -     Before surgery online class website link:  [email or web address]   After surgery online class:  Needed -     After surgery online class website link:  [email or web address]   Nurse visit per clinic:  Needed -     History and Physical per clinic:   -     Final labs per clinic: Needed -     Chest xray per clinic:   -     Electrocardiogram (ECG) per clinic:   -     Other:   -        Notes: Please register for the Get Well Loop when you get an email invitation and a surgery date.     The Get Well Loop will give you information via email or text messages that can help you be more successful before and after surgery.  It can also help ans,  wer any questions you may have.    Get Well Loop Information  [email or web address]       -            "

## 2022-04-27 NOTE — NURSING NOTE
Chief Complaint   Patient presents with     Follow Up     Pre-surgery with medication.       Vitals:    04/27/22 1338   Weight: 99.8 kg (220 lb)       Body mass index is 38.97 kg/m .      Cali Nunn, EMT  Surgery Clinic

## 2022-04-27 NOTE — LETTER
2022       RE: Swetha Patterson  [id number]  [facility] [address]     Dear Colleague,    Thank you for referring your patient, Swetha Patterson, to the [facility] [place] at [facility]. Please see a copy of my visit note below.    During this virtual visit the patient is located in [place], patient verifies this as the location during the entirety of this visit.     Swetha is a 51 year old who is being evaluated via a billable video visit.      How would you like to obtain your AVS? MyChart  If the video visit is dropped, the invitation should be resent by: Text to cell phone:  [phone]  Will anyone else be joining your video visit? No    Video Start Time: 2:07 PM    Video-Visit Details    Type of service:  Video Visit    Video End Time:2:32 PM    Originating Location (pt. Location): Home    Distant Location (provider location):  [facility] [place]     Platform used for Video Visit: Michael Nunn NREMT      30 minutes spent on the date of the encounter doing chart review, history and exam, documentation and further activities per the note    Return Medical Weight Management Note     Swetha Patterson  MRN:  [id number]  :  1970  SHERINE:  2022    Dear Roro Chawla MD,    I had the pleasure of seeing your patient Swetha Patterson. She is a 51 year old female who I am continuing to see for treatment of obesity related to:       2022   I have the following health issues associated with obesity: Type II Diabetes, High Blood Pressure, High Cholesterol, Sleep Apnea, GERD (Reflux), Fatty Liver, Asthma       Assessment & Plan   Problem List Items Addressed This Visit        Endocrine Diagnoses    Class 3 severe obesity due to excess calories with serious comorbidity and body mass index (BMI) of 40.0 to 44.9 in adult (H)       Other    DIAZ  (nonalcoholic steatohepatitis) (Chronic)      Other Visit Diagnoses     Tobacco abuse    -  Primary    Relevant Orders    Nicotine and Mets, Urn, Quant           INTERVAL HISTORY:  Pre bariatric surgery. NBS consult with me Jan 2022  Referred by GI who she sees for delayed gastric emptying, nausea, GERD, hiatal hernia  DIAZ, Fibroscan with Thuy Mobley NP  Quit smoking Feb 2022, still uses occasional patch.  Quit drinking May 2021  Sleep study scheduled July 2022  Psych May 2nd   Needs letter from psychiatrist  Needs to meet Dr Leach after psych eval    Taking oral semuglutide since 1 month ago, prescribed by PCP  - fasting, improved from 150's  Still taking metformin, off glipizide    PLAN:  Possible sleeve August 2022  Continue oral semaglutide, no more weight loss so far but has helped with maintaining her weight loss and eating changes.  It is covered and she is tolerating it well.  Psych eval pending 5/22/22 Marcie Diaz  Nicotine test needed, order placed today  Letter from psychiatrist needed  GERD symptoms resolved with weight loss and diet changes  Plan to see Dr Leach to discuss sleeve gastrectomy after psych eval , smoking cessation and letter from psychiatrist      Letter from pulmonary 3/2/22: Dr Madison in Epic  Naya-operative Plan  - no barriers from our standpoint for proceeding with surgery  - her Trelegy inhaler should be continued during the hospitalization  - if unable to use Trelegy (ie intubated) then use BID budesonide nebs and QID duonebs  - if able to use Trelegy then use TID albuterol throughout her hospitalization  - use airway clearance of some form to be done by respiratory therapist TID (ie Acapella flutter valve or metanebs)  - use incentive spirometer every hour, up in chair as much as possible, etc.     Cardiology: 3/15/22 Lesley Morales PA-C  ASSESSMENT/PLAN:  51-year-old female with morbid obesity, peripheral artery disease, COPD, emphysema, GERD, hypertension.        She has  normal LVEF, no anginal discomfort. She recently underwent a stress test which showed no evidence of inducible ischemia with normal LV function.      She has quit smoking.      She has lost about 17 lbs with lifestyle changes.      She is to undergo bariatric surgery in May 2022 (sleeve).      If she is to undergo intraperitoneal surgery (I am not sure how they perform the surgery), this is a high risk surgery but without any cardiac concerns, she would be at low risk for periprocedural complications.   She is also seeing pulm re preop evaluation.     Lesley Morales PA-C      CURRENT WEIGHT:   220 lbs 0 oz    Initial Weight (lbs): 247 lbs  Last Visits Weight: 105.6 kg (232 lb 11.2 oz)  Cumulative weight loss (lbs): 27  Weight Loss Percentage: 10.93%    Changes and Difficulties 4/24/2022   I have made the following changes to my diet since my last visit: Eating in smaller portions  more veggies  and some times turkey meat low fat or no fat.   With regards to my diet, I am still struggling with: Wanting to drink pop or just some of my old food.   I have made the following changes to my activity/exercise since my last visit: Enjoy my walks and being able to tie my shoes and walk up and down stairs ..   With regards to my activity/exercise, I am still struggling with: Sore hipps i do stop once in a whilerybelsus         MEDICATIONS:   Current Outpatient Medications   Medication Sig Dispense Refill     albuterol (PROVENTIL) (2.5 MG/3ML) 0.083% neb solution Take 1 vial (2.5 mg) by nebulization every 6 hours as needed for shortness of breath / dyspnea or wheezing 25 vial 3     albuterol (VENTOLIN HFA) 108 (90 Base) MCG/ACT inhaler Inhale 2 puffs into the lungs every 6 hours 3 each 3     amLODIPine (NORVASC) 2.5 MG tablet Take 2 tablets (5 mg) by mouth daily 180 tablet 2     atorvastatin (LIPITOR) 80 MG tablet TAKE ONE TABLET BY MOUTH EVERY DAY 90 tablet 3     benzoyl peroxide (ACNE-CLEAR) 10 % external gel Apply  topically 2 times daily as needed 90 g 1     blood glucose (ACCU-CHEK ALLYSON PLUS) test strip USE TO TEST BLOOD SUGAR ONCE DAILY OR AS DIRECTED 100 strip 3     blood glucose (NO BRAND SPECIFIED) lancets standard Use to test blood sugar 1 times daily or as directed. 100 each 3     blood glucose (NO BRAND SPECIFIED) lancets standard Use to test blood sugar 1 time daily 100 each 1     blood glucose (NO BRAND SPECIFIED) test strip Use to test blood sugar 1 times daily or as directed.  Dispense Accu-chek Allyson Plus or per patient insurance 100 strip 3     blood glucose monitoring (NO BRAND SPECIFIED) meter device kit Use to test blood sugar 1 times daily or as directed.  Dispense Accu-chek Allyson Plus or per insurance preference 1 kit 0     blood glucose monitoring (NO BRAND SPECIFIED) meter device kit Use to test blood sugar 1 times daily or as directed. 1 kit 0     budesonide-formoterol (SYMBICORT) 160-4.5 MCG/ACT Inhaler Inhale 2 puffs into the lungs 2 times daily 30.6 g 3     buPROPion (WELLBUTRIN XL) 150 MG 24 hr tablet TAKE ONE TABLET BY MOUTH EVERY MORNING 90 tablet 2     cetirizine (ZYRTEC) 10 MG tablet TAKE ONE TABLET BY MOUTH EVERY DAY AS NEEDED 90 tablet 3     cetirizine HCl 10 MG CAPS Take 1 capsule (10 mg) by mouth daily as needed Takes in the spring. 90 capsule 3     clindamycin (CLINDAMAX) 1 % external gel Apply topically 2 times daily 60 g 3     clonazePAM (KLONOPIN) 0.5 MG tablet Take 0.5 mg by mouth daily as needed        clonazePAM (KLONOPIN) 1 MG tablet Take 1 tablet (1 mg) by mouth 2 times daily as needed for anxiety She needs to see her PCP to get more tablets 5 tablet 0     Emollient (CERAVE MOISTURIZING) CREA Externally apply 1 Application topically 2 times daily 453 g 11     fluticasone (FLONASE) 50 MCG/ACT nasal spray Spray 2 sprays in nostril daily (Patient taking differently: Spray 2 sprays in nostril daily as needed ) 16 g 5     furosemide (LASIX) 20 MG tablet TAKE ONE TABLET BY MOUTH TWICE A   tablet 3     hydrocortisone 2.5 % cream APPLY TOPICALLY TO THE AFFECTED AREA(S) TWO TIMES A DAY 28.35 g 1     hydrOXYzine (ATARAX) 50 MG tablet TAKE TWO TABLETS BY MOUTH THREE TIMES A DAY AS NEEDED FOR ANXIETY 70 tablet 1     lamoTRIgine (LAMICTAL) 150 MG tablet Take 150 mg by mouth daily       lamoTRIgine (LAMICTAL) 200 MG tablet Take 200 mg by mouth daily       lisinopril (ZESTRIL) 20 MG tablet TAKE ONE TABLET BY MOUTH EVERY DAY 90 tablet 0     metFORMIN (GLUCOPHAGE) 500 MG tablet TAKE ONE TABLET BY MOUTH EVERY DAY WITH BREAKFAST 90 tablet 1     minoxidil (ROGAINE) 5 % external solution apply per package directions to the scalp once daily 120 mL 3     montelukast (SINGULAIR) 10 MG tablet TAKE ONE TABLET BY MOUTH AT BEDTIME 90 tablet 0     multivitamin, therapeutic (THERA-VIT) TABS tablet Take 1 tablet by mouth daily       nicotine (NICODERM CQ) 14 MG/24HR 24 hr patch Place 1 patch onto the skin every 24 hours 28 patch 1     nicotine (NICORETTE) 2 MG gum Place 1 each (2 mg) inside cheek every hour as needed for smoking cessation 240 each 1     nitroGLYcerin (NITROSTAT) 0.4 MG sublingual tablet PLACE ONE TABLET UNDER THE TONGUE AT THE 1ST SIGN OF ATTACK. IF PAIN IS UNRELIEVED OR WORSENED 5 MINUTES AFTER 1ST DOSE, PROMPT MEDICAL ASSISTANCE IS NEEDED. MAY REPEAT EVERY 5 MINUTES UNTIL PAIN IS RELIEVED. MAX OF THREE DOSES 25 tablet 11     nystatin (MYCOSTATIN) 839627 UNIT/GM external powder APPLY TOPICALLY TWICE A DAY AS NEEDED SKIN RASH 45 g 3     omeprazole (PRILOSEC) 20 MG DR capsule TAKE ONE CAPSULE BY MOUTH TWO TIMES A  capsule 1     ondansetron (ZOFRAN) 8 MG tablet TAKE ONE-HALF TO ONE TABLET BY MOUTH EVERY 8 HOURS AS NEEDED FOR NAUSEA 30 tablet 1     ondansetron (ZOFRAN-ODT) 4 MG ODT tab Take 1 tablet (4 mg) by mouth every 6 hours as needed for nausea 12 tablet 1     polyethylene glycol (MIRALAX) 17 GM/Dose powder Take 17 g (1 capful) by mouth daily 507 g 1     Semaglutide 3 MG TABS Take 3 mg by  mouth daily 30 tablet 1     sulfamethoxazole-trimethoprim (BACTRIM DS) 800-160 MG tablet Take 1 tablet by mouth 2 times daily Take one tablet twice daily. For additional refills, please schedule a follow-up appointment. 180 tablet 1     terbinafine (LAMISIL) 1 % external cream APPLY TO AFFECTED AREA(S) TWO TIMES A DAY 30 g 2     triamcinolone (KENALOG) 0.025 % external ointment Apply topically 2 times daily 80 g 1     Vitamin D3 (CHOLECALCIFEROL) 125 MCG (5000 UT) tablet Take 1 tablet by mouth three times a week         Weight Loss Medication History Reviewed With Patient 4/24/2022   Which weight loss medications are you currently taking on a regular basis?  None   If you are not taking a weight loss medication that was prescribed to you, please indicate why: Other   Are you having any side effects from the weight loss medication that we have prescribed you? No   If you are having side effects please describe: None       Lab on 01/24/2022   Component Date Value Ref Range Status     WBC Count 01/24/2022 6.7  4.0 - 11.0 10e3/uL Final     RBC Count 01/24/2022 5.12  3.80 - 5.20 10e6/uL Final     Hemoglobin 01/24/2022 13.9  11.7 - 15.7 g/dL Final     Hematocrit 01/24/2022 44.5  35.0 - 47.0 % Final     MCV 01/24/2022 87  78 - 100 fL Final     MCH 01/24/2022 27.1  26.5 - 33.0 pg Final     MCHC 01/24/2022 31.2 (A) 31.5 - 36.5 g/dL Final     RDW 01/24/2022 16.2 (A) 10.0 - 15.0 % Final     Platelet Count 01/24/2022 379  150 - 450 10e3/uL Final     Sodium 01/24/2022 135  133 - 144 mmol/L Final     Potassium 01/24/2022 4.0  3.4 - 5.3 mmol/L Final     Chloride 01/24/2022 104  94 - 109 mmol/L Final     Carbon Dioxide (CO2) 01/24/2022 26  20 - 32 mmol/L Final     Anion Gap 01/24/2022 5  3 - 14 mmol/L Final     Urea Nitrogen 01/24/2022 16  7 - 30 mg/dL Final     Creatinine 01/24/2022 0.85  0.52 - 1.04 mg/dL Final     Calcium 01/24/2022 9.6  8.5 - 10.1 mg/dL Final     Glucose 01/24/2022 126 (A) 70 - 99 mg/dL Final     Alkaline  Phosphatase 01/24/2022 156 (A) 40 - 150 U/L Final     AST 01/24/2022 47 (A) 0 - 45 U/L Final     ALT 01/24/2022 89 (A) 0 - 50 U/L Final     Protein Total 01/24/2022 7.7  6.8 - 8.8 g/dL Final     Albumin 01/24/2022 3.6  3.4 - 5.0 g/dL Final     Bilirubin Total 01/24/2022 0.4  0.2 - 1.3 mg/dL Final     GFR Estimate 01/24/2022 82  >60 mL/min/1.73m2 Final    Effective December 21, 2021 eGFRcr in adults is calculated using the 2021 CKD-EPI creatinine equation which includes age and gender (Tanesha et al., NE, DOI: 10.1056/LIPYnn2708467)     Hemoglobin A1C 01/24/2022 5.8 (A) 0.0 - 5.6 % Final    Normal <5.7%   Prediabetes 5.7-6.4%    Diabetes 6.5% or higher     Note: Adopted from ADA consensus guidelines.     Vitamin D, Total (25-Hydroxy) 01/24/2022 54  20 - 75 ug/L Final     Parathyroid Hormone Intact 01/24/2022 23  pg/mL Final    : 8.7-79.6 pg/mL  : 12-64 pg/mL  Other races - Normal range is not specified     Cholesterol 01/24/2022 167  <200 mg/dL Final     Triglycerides 01/24/2022 227 (A) <150 mg/dL Final     Direct Measure HDL 01/24/2022 42 (A) >=50 mg/dL Final     LDL Cholesterol Calculated 01/24/2022 80  <=100 mg/dL Final     Non HDL Cholesterol 01/24/2022 125  <130 mg/dL Final     Patient Fasting > 8hrs? 01/24/2022 Yes   Final     EGD 6/17/21    Impression:               - Tortuous distal esophagus.                             - Mildly severe reflux esophagitis in the distal                             esophagus (c/w LA grade A). Biopsied.                             - Small sliding type hiatal hernia.                             - Gastroesophageal flap valve classified as Hill                             Grade III (minimal fold, loose to endoscope, hiatal                             hernia likely).                             - Non-bleeding erosive gastropathy. Biopsied.                             - Erythematous mucosa in the gastric body. Biopsied.                             - Normal examined  "duodenum.   Recommendation:           - Await pathology results.                             - Return to referring physician after studies are                             complete.                             - Weight loss.                             - Continue omeprazole.    PHYSICAL EXAM:  Objective    Ht 1.6 m (5' 2.99\")   Wt 99.8 kg (220 lb)   LMP 04/15/2016 (Exact Date)   BMI 38.98 kg/m             Vitals:  No vitals were obtained today due to virtual visit.    GENERAL: Healthy, alert and no distress  EYES: Eyes grossly normal to inspection.  No discharge or erythema, or obvious scleral/conjunctival abnormalities.  RESP: No audible wheeze, cough, or visible cyanosis.  No visible retractions or increased work of breathing.    SKIN: Visible skin clear. No significant rash, abnormal pigmentation or lesions.  NEURO: Cranial nerves grossly intact.  Mentation and speech appropriate for age.  PSYCH: Mentation appears normal, affect normal/bright, judgement and insight intact, normal speech and appearance well-groomed.        Sincerely,    Rashmi Del Rio PA-C    "

## 2022-04-28 NOTE — TELEPHONE ENCOUNTER
Pending Prescriptions:                       Disp   Refills    hydrOXYzine (ATARAX) 50 MG tablet [Pharmac*70 tab*1        Sig: TAKE TWO TABLETS BY MOUTH THREE TIMES A DAY AS NEEDED           FOR ANXIETY    montelukast (SINGULAIR) 10 MG tablet [Phar*90 tab*0        Sig: TAKE ONE TABLET BY MOUTH AT BEDTIME

## 2022-04-29 RX ORDER — MONTELUKAST SODIUM 10 MG/1
TABLET ORAL
Qty: 90 TABLET | Refills: 0 | Status: SHIPPED | OUTPATIENT
Start: 2022-04-29 | End: 2022-08-02

## 2022-04-29 RX ORDER — HYDROXYZINE HYDROCHLORIDE 50 MG/1
TABLET, FILM COATED ORAL
Qty: 70 TABLET | Refills: 1 | Status: SHIPPED | OUTPATIENT
Start: 2022-04-29 | End: 2022-05-25

## 2022-05-02 ENCOUNTER — APPOINTMENT (OUTPATIENT)
Dept: NEUROPSYCHOLOGY | Facility: CLINIC | Age: 52
End: 2022-05-02
Payer: COMMERCIAL

## 2022-05-03 ENCOUNTER — TELEPHONE (OUTPATIENT)
Dept: NEUROPSYCHOLOGY | Facility: CLINIC | Age: 52
End: 2022-05-03
Payer: COMMERCIAL

## 2022-05-03 NOTE — TELEPHONE ENCOUNTER
Patient called stating she needs to reschedule her psychological evaluation with Dr. Diaz. Was scheduled yesterday at 8:30 a.m. for a video visit. States her father is ill and family spent the day in the hospital. Rescheduled to 8/9 at 9:00 a.m.

## 2022-05-05 ENCOUNTER — VIRTUAL VISIT (OUTPATIENT)
Dept: INTERNAL MEDICINE | Facility: CLINIC | Age: 52
End: 2022-05-05
Payer: COMMERCIAL

## 2022-05-05 VITALS — HEIGHT: 63 IN | BODY MASS INDEX: 38.97 KG/M2

## 2022-05-05 DIAGNOSIS — E78.5 HYPERLIPIDEMIA LDL GOAL <130: ICD-10-CM

## 2022-05-05 DIAGNOSIS — J44.9 COPD, MODERATE (H): ICD-10-CM

## 2022-05-05 DIAGNOSIS — E66.813 CLASS 3 SEVERE OBESITY DUE TO EXCESS CALORIES WITHOUT SERIOUS COMORBIDITY WITH BODY MASS INDEX (BMI) OF 50.0 TO 59.9 IN ADULT (H): ICD-10-CM

## 2022-05-05 DIAGNOSIS — E11.9 TYPE 2 DIABETES MELLITUS WITHOUT COMPLICATION, WITHOUT LONG-TERM CURRENT USE OF INSULIN (H): Primary | ICD-10-CM

## 2022-05-05 DIAGNOSIS — E66.01 CLASS 3 SEVERE OBESITY DUE TO EXCESS CALORIES WITHOUT SERIOUS COMORBIDITY WITH BODY MASS INDEX (BMI) OF 50.0 TO 59.9 IN ADULT (H): ICD-10-CM

## 2022-05-05 DIAGNOSIS — I10 HTN, GOAL BELOW 140/90: ICD-10-CM

## 2022-05-05 PROCEDURE — 99214 OFFICE O/P EST MOD 30 MIN: CPT | Mod: 95 | Performed by: INTERNAL MEDICINE

## 2022-05-05 NOTE — PROGRESS NOTES
Swetha is a 52 year old who is being evaluated via a billable video visit.      How would you like to obtain your AVS? Mail a copy  If the video visit is dropped, the invitation should be resent by: Text to cell phone: [phone]  Will anyone else be joining your video visit? No       This is a VIDEO ( using Doxim[employer])  encounter with the patient.       Location of the provider : office   Location of the patient : home      07:42 --- 07:56          Dr Low's note      Patient's instructions / PLAN:                                                        Plan:  1. Increase the Semaglutide to 7 mg daily  2. Continue the other meds, same doses for now.  3. Please make an appointment  For ANNUAL EXAM   4. Please make a lab appointment for fasting labs  Few days before the appointment     ASSESSMENT & PLAN:                                                      (E78.5) Hyperlipidemia LDL goal <130  (primary encounter diagnosis)  Comment: Controlled    Plan: CBC with platelets, Hemoglobin A1c, CK total,         Comprehensive metabolic panel, Lipid panel         reflex to direct LDL Fasting, TSH with free T4         reflex, Albumin Random Urine Quantitative with         Creat Ratio            (E66.01,  Z68.43) Class 3 severe obesity due to excess calories without serious comorbidity with body mass index (BMI) of 50.0 to 59.9 in adult (H)  Comment: lost wt but now plateau   Plan: Semaglutide 7 MG TABS, CBC with platelets,         Hemoglobin A1c, CK total, Comprehensive         metabolic panel, Lipid panel reflex to direct         LDL Fasting, TSH with free T4 reflex, Albumin         Random Urine Quantitative with Creat Ratio            (E11.9) Type 2 diabetes mellitus without complication, without long-term current use of insulin (H)  Comment: BS better   Plan: Semaglutide 7 MG TABS, CBC with platelets,         Hemoglobin A1c, CK total, Comprehensive         metabolic panel, Lipid panel reflex to direct         LDL  Fasting, TSH with free T4 reflex, Albumin         Random Urine Quantitative with Creat Ratio        as above     (I10) HTN, goal below 140/90  Comment: Controlled Plan: CBC with platelets, Hemoglobin A1c, CK total,         Comprehensive metabolic panel, Lipid panel         reflex to direct LDL Fasting, TSH with free T4         reflex, Albumin Random Urine Quantitative with         Creat Ratio               Chief complaint:                                                      Follow up chronic medical problems      SUBJECTIVE:                                                    History of present illness:         Swetha is a 52 year old who presents for the following health issues     DM and Obesity  -- BS: 110-120. Few higher  -- the weight is stable. Unable to loose more wt  -- she tolerates semaglutide 3 mg now. In the beginning she had some GI issues   -- willing to try higher dose         LOV: Plan:  1. Semaglutide 3 mg daily -- for diabetes and obesity    If it is not covered, patient needs to call insurance and find out which equivalent is covered.    2. Follow up video in 3 weeks  3. Letter for the Wt loss clinic       Patient is being seen to discuss weight loss.  History of Present Illness     Asthma:  She presents for follow up of asthma.  She has no cough, some wheezing, and no shortness of breath. She is using a relief medication daily. She does not miss any doses of her controller medication throughout the week.Patient is aware of the following triggers: dust mites, humidity, mold, strong odors and fumes and upper respiratory infections. The patient has not had a visit to the Emergency Room, Urgent Care or Hospital due to asthma since the last clinic visit.     Diabetes:   She presents for follow up of diabetes.  She is checking home blood glucose two times daily. She checks blood glucose before meals and at bedtime.  Blood glucose is sometimes over 200 and never under 70. She is aware of hypoglycemia  "symptoms including shakiness, dizziness and other. She is concerned about other. She is having excessive thirst and weight loss. The patient has had a diabetic eye exam in the last 12 months. Eye exam performed on 2 months  ago. Location of last eye exam [facility].    She consumes 1 sweetened beverage(s) daily.She exercises with enough effort to increase her heart rate 20 to 29 minutes per day.  She exercises with enough effort to increase her heart rate 7 days per week.   She is taking medications regularly.       Review of Systems:                                                      ROS: negative for fever, chills, cough, wheezes, chest pain, shortness of breath, vomiting, abdominal pain, leg swelling         OBJECTIVE:           An actual physical exam can't be done during phone visit   A limited exam can sometimes be performed by video visit   NAD      PMHx: reviewed  Past Medical History:   Diagnosis Date     Anxiety state, unspecified      Asthma      Chronic pain     back pain from cyst     Contact dermatitis and other eczema, due to unspecified cause      COPD (chronic obstructive pulmonary disease) (H)      Depressive disorder, not elsewhere classified      Diabetes (H)      Emphysema with chronic bronchitis (H)      Esophageal reflux      Family history of ischemic heart disease      Fibromyalgia      Gastro-oesophageal reflux disease      Heart disease     has chest pain sometimes     History of emphysema      Hoarseness      HTN, goal below 140/90      Hyperlipidemia LDL goal <130      Liver disease     \"fatty liver\"     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)      PONV (postoperative nausea and vomiting)      Rectal bleeding       PSHx: reviewed  Past Surgical History:   Procedure Laterality Date     ANESTHESIA OUT OF OR MRI N/A 10/7/2021    Procedure: ANESTHESIA OUT OF OR MRI;  Surgeon: GENERIC ANESTHESIA PROVIDER;  Location: [facility]     ARTHROSCOPY SHOULDER DECOMPRESSION Left 10/21/2015    " Procedure: ARTHROSCOPY SHOULDER DECOMPRESSION;  Surgeon: Julien Milian MD;  Location: [facility]     BIOPSY ARTERY TEMPORAL Left 3/11/2020    Procedure: LEFT TEMPORAL ARTERY BIOPSY;  Surgeon: Ibeth Conner MD;  Location: [facility]     BRONCHIAL THERMOPLASTY N/A 11/14/2014    Procedure: BRONCHIAL THERMOPLASTY;  Surgeon: Ward Whitaker MD;  [facility]     BRONCHIAL THERMOPLASTY N/A 12/19/2014    Procedure: BRONCHIAL THERMOPLASTY;  Surgeon: Ward Whitaker MD;  [facility]     BRONCHIAL THERMOPLASTY N/A 2/6/2015    Procedure: BRONCHIAL THERMOPLASTY;  Surgeon: Ward Whitaker MD;  [facility]     COLONOSCOPY N/A 11/26/2018    Procedure: COLONOSCOPY ([facility]);  Surgeon: Marshall Oakes MD;  Location: [facility]     COLONOSCOPY N/A 10/22/2020    Procedure: Colonoscopy;  Surgeon: Marshall Mccormick MD;  Location: [facility]     DISCECTOMY, FUSION CERVICAL ANTERIOR ONE LEVEL, COMBINED N/A 5/1/2018    Procedure: COMBINED DISCECTOMY, FUSION CERVICAL ANTERIOR ONE LEVEL;  1.  C5-C6 anterior cervical diskectomy and fusion.    2.  C5-C6 application of intervertebral biomechanical device for interbody fusion purposes.    3.  C5-C6 anterior instrumentation using the standard Kristin InViZia 24 mm plate with four associated bone screws, 12 mm in length.  Inferior screws are fixed.  Superior screws are va     ENT SURGERY  2015    polyps removed from vocal cords      ESOPHAGOSCOPY, GASTROSCOPY, DUODENOSCOPY (EGD), COMBINED N/A 10/22/2020    Procedure: Esophagoscopy, gastroscopy, duodenoscopy with biopsies;  Surgeon: Marshall Mccormick MD;  Location: [facility]     ESOPHAGOSCOPY, GASTROSCOPY, DUODENOSCOPY (EGD), COMBINED N/A 6/17/2021    Procedure: ESOPHAGOGASTRODUODENOSCOPY, WITH BIOPSY;  Surgeon: Darrel Damon MD;  Location: [facility]     EXCISE MASS TRUNK Left 11/3/2021    Procedure: EXCISION LEFT SHOULDER LIPOMA;  Surgeon: Ibeth Conner MD;  Location: [facility]     EXCISE NODE MEDIASTINAL  4/26/2013     Procedure: EXCISE NODE MEDIASTINAL;;  Surgeon: Av Peña MD;  Location: [facility]     LAPAROSCOPIC CHOLECYSTECTOMY N/A 10/19/2017    Procedure: LAPAROSCOPIC CHOLECYSTECTOMY;  LAPAROSCOPIC CHOLECYSTECTOMY;  Surgeon: Ney Jerry MD;  Location:  OR     THORACOSCOPY  4/26/2013    Procedure: THORACOSCOPY;  LEFT VIDEO ASSISTED THORACOSCOPY, RESECTION OF POSTERIOR MEDIASTINAL MASS;  Surgeon: Av Peña MD;  Location: [facility]     TONSILLECTOMY  as a kid     TONSILLECTOMY       ZZC APPENDECTOMY  at age 17        Meds: reviewed  Current Outpatient Medications   Medication Sig Dispense Refill     albuterol (PROVENTIL) (2.5 MG/3ML) 0.083% neb solution Take 1 vial (2.5 mg) by nebulization every 6 hours as needed for shortness of breath / dyspnea or wheezing 25 vial 3     albuterol (VENTOLIN HFA) 108 (90 Base) MCG/ACT inhaler Inhale 2 puffs into the lungs every 6 hours 3 each 3     amLODIPine (NORVASC) 2.5 MG tablet Take 2 tablets (5 mg) by mouth daily 180 tablet 2     atorvastatin (LIPITOR) 80 MG tablet TAKE ONE TABLET BY MOUTH EVERY DAY 90 tablet 3     benzoyl peroxide (ACNE-CLEAR) 10 % external gel Apply topically 2 times daily as needed 90 g 1     blood glucose (ACCU-CHEK ALLYSON PLUS) test strip USE TO TEST BLOOD SUGAR ONCE DAILY OR AS DIRECTED 100 strip 3     blood glucose (NO BRAND SPECIFIED) lancets standard Use to test blood sugar 1 times daily or as directed. 100 each 3     blood glucose (NO BRAND SPECIFIED) test strip Use to test blood sugar 1 times daily or as directed.  Dispense Accu-chek Allyson Plus or per patient insurance 100 strip 3     blood glucose monitoring (NO BRAND SPECIFIED) meter device kit Use to test blood sugar 1 times daily or as directed.  Dispense Accu-chek Allyson Plus or per insurance preference 1 kit 0     blood glucose monitoring (NO BRAND SPECIFIED) meter device kit Use to test blood sugar 1 times daily or as directed. 1 kit 0     budesonide-formoterol (SYMBICORT)  160-4.5 MCG/ACT Inhaler Inhale 2 puffs into the lungs 2 times daily 30.6 g 3     buPROPion (WELLBUTRIN XL) 150 MG 24 hr tablet TAKE ONE TABLET BY MOUTH EVERY MORNING 90 tablet 2     cetirizine (ZYRTEC) 10 MG tablet TAKE ONE TABLET BY MOUTH EVERY DAY AS NEEDED 90 tablet 3     cetirizine HCl 10 MG CAPS Take 1 capsule (10 mg) by mouth daily as needed Takes in the spring. 90 capsule 3     clindamycin (CLINDAMAX) 1 % external gel Apply topically 2 times daily 60 g 3     clonazePAM (KLONOPIN) 0.5 MG tablet Take 0.5 mg by mouth daily as needed        clonazePAM (KLONOPIN) 1 MG tablet Take 1 tablet (1 mg) by mouth 2 times daily as needed for anxiety She needs to see her PCP to get more tablets 5 tablet 0     Emollient (CERAVE MOISTURIZING) CREA Externally apply 1 Application topically 2 times daily 453 g 11     fluticasone (FLONASE) 50 MCG/ACT nasal spray Spray 2 sprays in nostril daily (Patient taking differently: Spray 2 sprays in nostril daily as needed ) 16 g 5     furosemide (LASIX) 20 MG tablet TAKE ONE TABLET BY MOUTH TWICE A  tablet 3     hydrocortisone 2.5 % cream APPLY TOPICALLY TO THE AFFECTED AREA(S) TWO TIMES A DAY 28.35 g 1     hydrOXYzine (ATARAX) 50 MG tablet TAKE TWO TABLETS BY MOUTH THREE TIMES A DAY AS NEEDED FOR ANXIETY 70 tablet 1     lamoTRIgine (LAMICTAL) 150 MG tablet Take 150 mg by mouth daily       lamoTRIgine (LAMICTAL) 200 MG tablet Take 200 mg by mouth daily       lisinopril (ZESTRIL) 20 MG tablet TAKE ONE TABLET BY MOUTH EVERY DAY 90 tablet 0     metFORMIN (GLUCOPHAGE) 500 MG tablet TAKE ONE TABLET BY MOUTH EVERY DAY WITH BREAKFAST 90 tablet 1     minoxidil (ROGAINE) 5 % external solution apply per package directions to the scalp once daily 120 mL 3     montelukast (SINGULAIR) 10 MG tablet TAKE ONE TABLET BY MOUTH AT BEDTIME 90 tablet 0     multivitamin, therapeutic (THERA-VIT) TABS tablet Take 1 tablet by mouth daily       nicotine (NICODERM CQ) 14 MG/24HR 24 hr patch Place 1 patch onto  the skin every 24 hours 28 patch 1     nicotine (NICORETTE) 2 MG gum Place 1 each (2 mg) inside cheek every hour as needed for smoking cessation 240 each 1     nitroGLYcerin (NITROSTAT) 0.4 MG sublingual tablet PLACE ONE TABLET UNDER THE TONGUE AT THE 1ST SIGN OF ATTACK. IF PAIN IS UNRELIEVED OR WORSENED 5 MINUTES AFTER 1ST DOSE, PROMPT MEDICAL ASSISTANCE IS NEEDED. MAY REPEAT EVERY 5 MINUTES UNTIL PAIN IS RELIEVED. MAX OF THREE DOSES 25 tablet 11     nystatin (MYCOSTATIN) 207492 UNIT/GM external powder APPLY TOPICALLY TWICE A DAY AS NEEDED SKIN RASH 45 g 3     omeprazole (PRILOSEC) 20 MG DR capsule TAKE ONE CAPSULE BY MOUTH TWO TIMES A  capsule 1     ondansetron (ZOFRAN) 8 MG tablet TAKE ONE-HALF TO ONE TABLET BY MOUTH EVERY 8 HOURS AS NEEDED FOR NAUSEA 30 tablet 1     ondansetron (ZOFRAN-ODT) 4 MG ODT tab Take 1 tablet (4 mg) by mouth every 6 hours as needed for nausea 12 tablet 1     polyethylene glycol (MIRALAX) 17 GM/Dose powder Take 17 g (1 capful) by mouth daily 507 g 1     Semaglutide 3 MG TABS Take 3 mg by mouth daily 30 tablet 1     sulfamethoxazole-trimethoprim (BACTRIM DS) 800-160 MG tablet Take 1 tablet by mouth 2 times daily Take one tablet twice daily. For additional refills, please schedule a follow-up appointment. 180 tablet 1     terbinafine (LAMISIL) 1 % external cream APPLY TO AFFECTED AREA(S) TWO TIMES A DAY 30 g 2     triamcinolone (KENALOG) 0.025 % external ointment Apply topically 2 times daily 80 g 1     Vitamin D3 (CHOLECALCIFEROL) 125 MCG (5000 UT) tablet Take 1 tablet by mouth three times a week         Soc Hx: reviewed  Fam Hx: reviewed          Roro Low MD  Internal Medicine

## 2022-05-05 NOTE — PATIENT INSTRUCTIONS
Plan:  1. Increase the Semaglutide to 7 mg daily  2. Continue the other meds, same doses for now.  3. Please make an appointment  For ANNUAL EXAM   4. Please make a lab appointment for fasting labs  Few days before the appointment

## 2022-05-06 NOTE — TELEPHONE ENCOUNTER
Pending Prescriptions:                       Disp   Refills    albuterol (PROVENTIL) (2.5 MG/3ML) 0.083% *75 mL  3        Sig: INHALE ONE VIAL BY NEBULIZER EVERY 6 HOURS AS NEEDED           FOR SHORTNESS OF BREATH OR WHEEZING    Routing refill request to provider for review/approval because:  Dr. Miranda is no longer within the organization.  Per the medication refill policy, the refill request is to be sent to the covering provider for review and recommendation.    Please advise, thanks.

## 2022-05-10 ENCOUNTER — TELEPHONE (OUTPATIENT)
Dept: INTERNAL MEDICINE | Facility: CLINIC | Age: 52
End: 2022-05-10
Payer: COMMERCIAL

## 2022-05-10 DIAGNOSIS — K59.00 CONSTIPATION, UNSPECIFIED CONSTIPATION TYPE: ICD-10-CM

## 2022-05-10 RX ORDER — ALBUTEROL SULFATE 0.83 MG/ML
SOLUTION RESPIRATORY (INHALATION)
Qty: 75 ML | Refills: 3 | Status: SHIPPED | OUTPATIENT
Start: 2022-05-10 | End: 2023-02-20

## 2022-05-10 RX ORDER — POLYETHYLENE GLYCOL 3350 17 G/17G
1 POWDER, FOR SOLUTION ORAL DAILY
Qty: 507 G | Refills: 1 | Status: SHIPPED | OUTPATIENT
Start: 2022-05-10 | End: 2023-01-09

## 2022-05-10 NOTE — TELEPHONE ENCOUNTER
[facility] [place]. Ok to call and lm [phone]  She also stated she wants the liquid medication they give to patients for colonoscopy to be sent to pharmacy as well. Patient does not have the name of that medication. Ok to call and lm [phone]

## 2022-05-10 NOTE — TELEPHONE ENCOUNTER
Called patient to clarify her request.  She is asking for refills of Miralax but also asking for something that sounds like Magnesium Citrate.  She states the last time she got backed up with no BM x3 days she felt awful and pharmacist gave her a bottle (about 12 oz) of clear liquid that tastes like salt and maybe lemon-lime which she states worked great.  Advised patient that with regular use of Miralax she shouldn't need the Mag Citrate.  Patient states she just started taking the Miralax again and will see how it works for her to take it daily or every other day.  SERJIO Chandra R.N.      [facility]

## 2022-05-11 RX ORDER — POLYETHYLENE GLYCOL 3350 17 G/17G
POWDER, FOR SOLUTION ORAL
Qty: 510 G | Refills: 1 | OUTPATIENT
Start: 2022-05-11

## 2022-05-11 NOTE — TELEPHONE ENCOUNTER
Polyethylene glycol (Miralax) 17 gm dose  Medication refill signed 5/10/22 for 507 g with 1 refill.

## 2022-05-13 ENCOUNTER — TELEPHONE (OUTPATIENT)
Dept: ENDOCRINOLOGY | Facility: CLINIC | Age: 52
End: 2022-05-13
Payer: COMMERCIAL

## 2022-05-13 ENCOUNTER — MYC MEDICAL ADVICE (OUTPATIENT)
Dept: ENDOCRINOLOGY | Facility: CLINIC | Age: 52
End: 2022-05-13
Payer: COMMERCIAL

## 2022-05-13 DIAGNOSIS — E66.813 CLASS 3 SEVERE OBESITY DUE TO EXCESS CALORIES WITH SERIOUS COMORBIDITY AND BODY MASS INDEX (BMI) OF 40.0 TO 44.9 IN ADULT (H): Primary | ICD-10-CM

## 2022-05-13 DIAGNOSIS — E66.01 CLASS 3 SEVERE OBESITY DUE TO EXCESS CALORIES WITH SERIOUS COMORBIDITY AND BODY MASS INDEX (BMI) OF 40.0 TO 44.9 IN ADULT (H): Primary | ICD-10-CM

## 2022-05-13 NOTE — TELEPHONE ENCOUNTER
Per Rashmi Del Rio PA-C sent patient information on the medication Naltrexone. Waiting for patient to respond back to [employer] message.

## 2022-05-13 NOTE — TELEPHONE ENCOUNTER
Sarah Caraballo called as you'd discussed possibly prescribing something to alleviate her night time food cravings. She feels she def needs something now.    [phone]  **OK to leave detailed VM

## 2022-05-13 NOTE — PROGRESS NOTES
"Swetha Patterson is a 52 year old female who is being evaluated via a billable video visit.      The patient has been notified of following:     \"This video visit will be conducted via a call between you and your physician/provider. We have found that certain health care needs can be provided without the need for an in-person physical exam.  This service lets us provide the care you need with a video conversation.  If a prescription is necessary we can send it directly to your pharmacy.  If lab work is needed we can place an order for that and you can then stop by our lab to have the test done at a later time.    Video visits are billed at different rates depending on your insurance coverage.  Please reach out to your insurance provider with any questions.    If during the course of the call the physician/provider feels a video visit is not appropriate, you will not be charged for this service.\"    How would you like to obtain your AVS? MyChart  If the video visit is dropped, the invitation should be resent by: Text to cell phone: [phone]  Will anyone else be joining your video visit? No      Video-Visit Details    Type of service:  Video Visit    Video Start Time: 1:24 pm  Video End Time: 1:57 pm    Originating Location (pt. Location): Home    Distant Location (provider location):  [facility] [place]     Platform used for Video Visit: [facility]    During this virtual visit the patient is located in [place], patient verifies this as the location during the entirety of this visit.     Bariatric Nutrition Consultation Note    Reason For Visit: Nutrition Assessment    Swetha Patterson is a 51 year old presenting today for return bariatric nutrition consult.   Pt is interested in laparoscopic sleeve gastrectomy.    This is pt's 4th of 3 required nutrition visits prior to surgery.   Pt needs formal post op education once she has a surgery date.     Pt referred by NOE Blake on " "January 20, 2022.    Coordination note:   Needs Psych eval  - Scheduled for August 9th with Dr. Diaz   Needs letter of support from therapist and psychiatrist   Needs clearance from sleep medicine - In progress, seeing again in July   Needs PCP letter of support - Done  Surgeon meet and greet with Dr. Leach - Scheduled   No nicotine for 3 months prior to surgery - Working on  Sobriety from alcohol for 1 year - May 2022     Needs baseline labs - Done 1/24/22  10 lb weight loss from initial - Met    Patient with Co-morbidities of obesity including:  Type II DM no but does have pre-diabetes, last A1C 5.8 on 1/24/22  Renal Failure no  Sleep apnea yes  Hypertension yes  Dyslipidemia yes  Joint pain no  Back pain no  GERD no     PMH: fatty liver (nonalcoholic per pt), fibromyalgia, cholecystomy, asthma    Support System Reviewed With Patient 1/19/2022   Who do you have in your support network that can be available to help you for the first 2 weeks after surgery? I have my sister Rukhsana a very close friend of keri OrozcoNolanmy and I have my dad and I also have psychiatrist my sister Rukhsana Kearns my dad and my psychiatrist again Rukhsana Milian   Who can you count on for support throughout your weight loss surgery journey? Rukhsana Haynesgurwinder Kearns my dad my psychiatrist       ANTHROPOMETRICS:  Consult weight 1/20/22: 247 lbs with BMI 43.75    Estimated body mass index is 38.97 kg/m  as calculated from the following:    Height as of 5/5/22: 1.6 m (5' 3\").    Weight as of 4/27/22: 99.8 kg (220 lb).     Current weight: 218-219 lbs, pt reported    Required weight loss goal pre-op: 10 lbs from initial consult weight (goal weight 237 lbs or less before surgery)       1/19/2022   I have tried the following methods to lose weight Watching portions or calories, Exercise, Physician directed program       Weight Loss Questions Reviewed With Patient 1/19/2022   How long have you been overweight? From Middle age and beyond "     MEDICATIONS FOR WEIGHT LOSS:  Semaglutide     Also takes Wellbutrin    SUPPLEMENT INFORMATION:  Vit D 5000 IUS/ MWF    Vit D WNL 1/24/22, hx of vit d def      NUTRITION HISTORY:  NKFA    Pt s friend had sleeve surgery - is familiar with some of it through her. Went well for friend.    Nicotine: Smoked for ~40+ years per pt. Quit cigarettes two weeks ago, using patch and gum. Refer to MTM. None for 3 months prior to surgery    Alcohol: hx of abuse. Sober since May 2021. No surgery until May 2022    Please see previous RD note for more detail.    March 2022:    Discussed task list for surgery.     Doing well with dietary goals  ? Chewing food well (counting to 15-20)  ? Smaller portions   ? Continues to be alcohol free  ? Continues to drink slower   ?  fluids from meal time for most part (might have a sip here/there) - at about 20 minutes with none right now    Protein sources: pb, yogurt, cheese, chicken and turkey     Recent recall:  - Banana  - Mixed fruit  - Cheese   - 2 Tbsp pb on celery   - Vegetable Soup   - 1 slice of pepperoni pizza     PA: walking lots    April 2022:    Pt shared she has not had a BM since Friday. Thinks it is related to the Rybelsus (Semglatuide) she recently started. She is hoping to stop the medication. Scared to eat right now due to being backed up.     Didn t eat anything yesterday - today only had two pickles thus far.      Continues to do well with dietary goals and weight loss. However, pt is struggling with changes in her body and  saggy skin . Pt concerned about how she will look going forward. She is not sure how to proceed right now - hesitant but also not wanting to give up.    Has found that if she overeats she will vomit - stomach is helping her know when to stop.     May 2022:  Was struggling with night eating - better now. Increased dose of medication and none since.    Continues to do well with goals -  fluids during meal time, chewing well, setting  utensil down, making good food choices.    Has practiced eating on smaller plates (saucer size)    Doing well resisting temptations.     Fluids: water, 1 coke zero/day, V8 juice occ, sugar-free flavorings in water    BM - was constipated but made some changes (more fiber, etc)     Nicotine: none - was using patches but stopped     PA: walking or moving around house    Previous goals:  1. Track protein intake for a few days (goal from last month) - Did not track. Trying to do more protein.     2. Keep up the great work!    - No alcohol - Met   - No smoking - Met   -  fluids from meal time - Met, drinks about 15 min after eating.    - Sipping fluids - Drinking water all day long   - Chewing/pace - Met    Recall Diet Questions Reviewed With Patient 1/19/2022   Describe what you typically consume for breakfast (typical or most recent): I usually don't eat much for breakfast maybe a piece of toast.   Describe what you typically consume for lunch (typical or most recent): Sometimes hot dogs or macaroni and cheese or hamburgers or fast food or eggs or just about anything   Describe what you typically consume for supper (typical or most recent): Always have to have meat with my dinner potato vegetable if it's corn or green beans but I prefer peas and then I snack at night time before bed because I'm so hungry   Describe what you typically consume as snacks (typical or most recent): Cereal bars chips sometimes fruit a hamburger fast food   How many ounces of water, or other low calorie drinks, do you drink daily (8 oz=1 glass)? 48 oz   How many ounces of caffeine (coffee, tea, pop) do you drink daily (8 oz=1 glass)? 8 oz   How many ounces of carbonated (pop, beer, sparkling water) drinks do you drinky daily (8 oz=1 glass)? 8 oz   How many ounces of juice, pop, sweet tea, sports drinks, protein drinks, other sweetened drinks, do you drink daily (8 oz=1 glass)? 24 oz   How many ounces of milk do you drink daily (8  oz=1 glass) 8 oz   Please indicate the type of milk: 2%   How often do you drink alcohol? Never   If you do drink alcohol, how many drinks might you have in a day? (one drink = 5 oz. wine, 1 can/bottle of beer, 1 shot liquor) -       Eating Habits 1/19/2022   Do you have any dietary restrictions? Yes   Do you currently binge eat (eat a large amount of food in a short time)? -   Are you an emotional eater? No   Do you get up to eat after falling asleep? Yes   What foods do you crave? I crave Pizza macaroni and cheese [employer] hamburger french fries but I do still eat my vegetables     Dining Out History Reviewed With Patient 1/19/2022   How often do you dine out? A couple of times a week.   Where do you dine out? (select all that apply) fast food chains, take out   What types of food do you order when you dine out? Some type of meat potato and of course you got to have a green beans or corn       Physical Activity Reviewed With Patient 2/22/2022   How often do you exercise? Daily   What is the duration of your exercise (in minutes)? 30 Minutes   What types of exercise do you do? walking, other   What keeps you from being more active?  I am as active as I can possbily be       NUTRITION DIAGNOSIS:  Obesity r/t long history of positive energy balance aeb BMI >30 kg/m2.    INTERVENTION:  Intervention Provided/Education Provided on post-op diet guidelines, vitamins/minerals essential post-operatively, GI anatomy of bariatric surgeries, ways to help prepare for post-op diet guidelines pre-operatively, portion/calorie-control, mindful eating and sources of protein.  Patient demonstrates understanding. Provided pt with list of goals RD contact information.      Questions Reviewed With Patient 1/19/2022   How ready are you to make changes regarding your weight? Number 1 = Not ready at all to make changes up to 10 = very ready. 10   How confident are you that you can change? 1 = Not confident that you will be successful making  changes up to 10 = very confident. 10       Expected Engagement: good    GOALS:  1. Keep up the great work!    - No alcohol   - No smoking   - Seperating fluids from meal time    - Sipping fluids   - Chewing/pace   - No carbonation after surgery    High Fiber Foods:  Bran cereal, 1/3 cup, 8.6 gm fiber   Cooked kidney beans   cup 7.9 gm  Cooked lentils   cup 7.8 gm  Cooked black beans   cup 7.6 gm  Canned chickpeas   cup 5.3 gm  Baked beans   cup 5.2 gm  Pear 1 5.1 gm  Soybeans   cup 5.1 gm  Quinoa   cup 5 gm  Baked sweet potato, with skin 1 medium 4.8 gm  Baked potato, with skin 1 medium 4.4 gm  Cooked frozen green peas   cup 4.4 gm  Bulgur   cup 4.1 gm  Cooked frozen mixed vegetables   cup 4 gm  Raspberries   cup 4 gm  Blackberries   cup 3.8 gm  Almonds 1 oz 3.5 gm  Cooked frozen spinach   cup 3.5 gm  Vegetable or soy yony 1 each 3.4 gm  Apple 1 medium 3.3 gm  Dried dates 5 pieces 3.3 gm      Surgery resources:  Diet Guidelines after Weight Loss Surgery  [email or web address]     Post-op Diet Advancement Schedule:  Clear Liquid Diet (stage 1):  TBD   *No RED, PROTEIN or PULP day before surgery  Low-Fat Full Liquid Diet (stage 2): TBD (2 weeks total between clear/liquid)  Pureed Diet (stage 3): TBD (2 weeks)  Soft Diet (stage 4): TBD (4 weeks)  Regular Diet (stage 5): TBD     Take the following after a Sleeve Gastrectomy:  - Multivitamin/minerals: adult dose 1-2 times daily  *Will need chewable for 3 months  - Iron: 18+ mg/day - may partly or fully be covered in multivitamin   - Calcium Citrate containing vitamin D: 500 mg 3 times daily or 600 mg 2 times daily              - Separate the calcium from your multivitamin or iron by at least 2 hours.               - Must be a chewable calcium citrate until post-op 3 months               - Options for calcium citrate: Beni calcium citrate chewable, bariatric advantage calcium citrate chewable, Celebrate vitamins calcium citrate chewable, Bariatric Fusion calcium  citrate chewable  - Vitamin D - at least 3,000 international units/day between all supplements  - Vitamin B12: sublingual form of at least 500 mcg daily or injection of 1000 mcg monthly     Diabetes Plate Method:  https://www.diabetesfoodhub.org/articles/what-is-the-diabetes-plate-method.html    Your Stage 1 Diet: Clear Liquids  [email or web address]     Your Stage 2 Diet: Low-fat Full Liquids  [email or web address]     Your Stage 3 Diet: Pureed Foods  [email or web address]     Pureed Recipes  [email or web address]    Your Stage 4 Diet: Soft Foods  [email or web address]    Your Stage 5 Diet: Regular Foods  [email or web address]      Follow up:   Tuesday, June 14th at 1:30 pm    Time spent with patient: minutes.  SHELLY Neal, RD, LD

## 2022-05-16 RX ORDER — NALTREXONE HYDROCHLORIDE 50 MG/1
TABLET, FILM COATED ORAL
Qty: 30 TABLET | Refills: 3 | Status: SHIPPED | OUTPATIENT
Start: 2022-05-16 | End: 2022-10-24

## 2022-05-17 ENCOUNTER — VIRTUAL VISIT (OUTPATIENT)
Dept: ENDOCRINOLOGY | Facility: CLINIC | Age: 52
End: 2022-05-17
Payer: COMMERCIAL

## 2022-05-17 VITALS — BODY MASS INDEX: 38.71 KG/M2 | WEIGHT: 218.5 LBS

## 2022-05-17 DIAGNOSIS — Z71.3 NUTRITIONAL COUNSELING: Primary | ICD-10-CM

## 2022-05-17 PROCEDURE — 97803 MED NUTRITION INDIV SUBSEQ: CPT | Mod: GT | Performed by: DIETITIAN, REGISTERED

## 2022-05-17 NOTE — PATIENT INSTRUCTIONS
GOALS:  1. Keep up the great work!    - No alcohol   - No smoking   - Seperating fluids from meal time    - Sipping fluids   - Chewing/pace   - No carbonation after surgery    High Fiber Foods:  Bran cereal, 1/3 cup, 8.6 gm fiber   Cooked kidney beans   cup 7.9 gm  Cooked lentils   cup 7.8 gm  Cooked black beans   cup 7.6 gm  Canned chickpeas   cup 5.3 gm  Baked beans   cup 5.2 gm  Pear 1 5.1 gm  Soybeans   cup 5.1 gm  Quinoa   cup 5 gm  Baked sweet potato, with skin 1 medium 4.8 gm  Baked potato, with skin 1 medium 4.4 gm  Cooked frozen green peas   cup 4.4 gm  Bulgur   cup 4.1 gm  Cooked frozen mixed vegetables   cup 4 gm  Raspberries   cup 4 gm  Blackberries   cup 3.8 gm  Almonds 1 oz 3.5 gm  Cooked frozen spinach   cup 3.5 gm  Vegetable or soy yony 1 each 3.4 gm  Apple 1 medium 3.3 gm  Dried dates 5 pieces 3.3 gm      Surgery resources:  Diet Guidelines after Weight Loss Surgery  [email or web address]     Post-op Diet Advancement Schedule:  Clear Liquid Diet (stage 1):  TBD   *No RED, PROTEIN or PULP day before surgery  Low-Fat Full Liquid Diet (stage 2): TBD (2 weeks total between clear/liquid)  Pureed Diet (stage 3): TBD (2 weeks)  Soft Diet (stage 4): TBD (4 weeks)  Regular Diet (stage 5): TBD     Take the following after a Sleeve Gastrectomy:  - Multivitamin/minerals: adult dose 1-2 times daily  *Will need chewable for 3 months  - Iron: 18+ mg/day - may partly or fully be covered in multivitamin   - Calcium Citrate containing vitamin D: 500 mg 3 times daily or 600 mg 2 times daily              - Separate the calcium from your multivitamin or iron by at least 2 hours.               - Must be a chewable calcium citrate until post-op 3 months               - Options for calcium citrate: Beni calcium citrate chewable, bariatric advantage calcium citrate chewable, Celebrate vitamins calcium citrate chewable, Bariatric Fusion calcium citrate chewable  - Vitamin D - at least 3,000 international units/day  between all supplements  - Vitamin B12: sublingual form of at least 500 mcg daily or injection of 1000 mcg monthly     Diabetes Plate Method:  https://www.diabetesfoodhub.org/articles/what-is-the-diabetes-plate-method.html    Your Stage 1 Diet: Clear Liquids  [email or web address]     Your Stage 2 Diet: Low-fat Full Liquids  [email or web address]     Your Stage 3 Diet: Pureed Foods  [email or web address]     Pureed Recipes  [email or web address]    Your Stage 4 Diet: Soft Foods  [email or web address]    Your Stage 5 Diet: Regular Foods  [email or web address]      Follow up:   Tuesday, June 14th at 1:30 pm    SHELLY Henning, RD, LD  Clinic #: [phone]

## 2022-05-17 NOTE — Clinical Note
Please mail pt her AVS and print outs of the handouts. She would also like some coupons for protein shakes (these are in  - Sally or Marisabel can show where if needed). Thank you!

## 2022-05-17 NOTE — LETTER
"5/17/2022       RE: Swetha Patterson  [id number]  [facility] [place] [address]     Dear Colleague,    Thank you for referring your patient, Swetha Patterson, to the [facility] [place] at [facility]. Please see a copy of my visit note below.    Swetha Patterson is a 52 year old female who is being evaluated via a billable video visit.      The patient has been notified of following:     \"This video visit will be conducted via a call between you and your physician/provider. We have found that certain health care needs can be provided without the need for an in-person physical exam.  This service lets us provide the care you need with a video conversation.  If a prescription is necessary we can send it directly to your pharmacy.  If lab work is needed we can place an order for that and you can then stop by our lab to have the test done at a later time.    Video visits are billed at different rates depending on your insurance coverage.  Please reach out to your insurance provider with any questions.    If during the course of the call the physician/provider feels a video visit is not appropriate, you will not be charged for this service.\"    How would you like to obtain your AVS? MyChart  If the video visit is dropped, the invitation should be resent by: Text to cell phone: [phone]  Will anyone else be joining your video visit? No      Video-Visit Details    Type of service:  Video Visit    Video Start Time: 1:24 pm  Video End Time: 1:57 pm    Originating Location (pt. Location): Home    Distant Location (provider location):  [facility] [place]     Platform used for Video Visit: [email or web address]    During this virtual visit the patient is located in [place], patient verifies this as the location during the entirety of this visit.     Bariatric Nutrition Consultation Note    Reason For Visit: Nutrition " "Assessment    Swetha Patterson is a 51 year old presenting today for return bariatric nutrition consult.   Pt is interested in laparoscopic sleeve gastrectomy.    This is pt's 4th of 3 required nutrition visits prior to surgery.   Pt needs formal post op education once she has a surgery date.     Pt referred by NOE Blake on January 20, 2022.    Coordination note:   Needs Psych eval  - Scheduled for August 9th with Dr. Diaz   Needs letter of support from therapist and psychiatrist   Needs clearance from sleep medicine - In progress, seeing again in July   Needs PCP letter of support - Done  Surgeon meet and greet with Dr. Leach - Scheduled   No nicotine for 3 months prior to surgery - Working on  Sobriety from alcohol for 1 year - May 2022     Needs baseline labs - Done 1/24/22  10 lb weight loss from initial - Met    Patient with Co-morbidities of obesity including:  Type II DM no but does have pre-diabetes, last A1C 5.8 on 1/24/22  Renal Failure no  Sleep apnea yes  Hypertension yes  Dyslipidemia yes  Joint pain no  Back pain no  GERD no     PMH: fatty liver (nonalcoholic per pt), fibromyalgia, cholecystomy, asthma    Support System Reviewed With Patient 1/19/2022   Who do you have in your support network that can be available to help you for the first 2 weeks after surgery? I have my sister Rukhsana a very close friend of keri Navarrete gautam Urmila and I have my dad and I also have psychiatrist my sister Rukhsana Kearns my dad and my psychiatrist again Rukhsana Milian   Who can you count on for support throughout your weight loss surgery journey? Rukhsana Kearns my dad my psychiatrist       ANTHROPOMETRICS:  Consult weight 1/20/22: 247 lbs with BMI 43.75    Estimated body mass index is 38.97 kg/m  as calculated from the following:    Height as of 5/5/22: 1.6 m (5' 3\").    Weight as of 4/27/22: 99.8 kg (220 lb).     Current weight: 218-219 lbs, pt reported    Required weight loss goal pre-op: 10 lbs from " initial consult weight (goal weight 237 lbs or less before surgery)       1/19/2022   I have tried the following methods to lose weight Watching portions or calories, Exercise, Physician directed program       Weight Loss Questions Reviewed With Patient 1/19/2022   How long have you been overweight? From Middle age and beyond     MEDICATIONS FOR WEIGHT LOSS:  Semaglutide     Also takes Wellbutrin    SUPPLEMENT INFORMATION:  Vit D 5000 IUS/ MWF    Vit D WNL 1/24/22, hx of vit d def      NUTRITION HISTORY:  NKFA    Pt s friend had sleeve surgery - is familiar with some of it through her. Went well for friend.    Nicotine: Smoked for ~40+ years per pt. Quit cigarettes two weeks ago, using patch and gum. Refer to [facility]. None for 3 months prior to surgery    Alcohol: hx of abuse. Sober since May 2021. No surgery until May 2022    Please see previous RD note for more detail.    March 2022:    Discussed task list for surgery.     Doing well with dietary goals  ? Chewing food well (counting to 15-20)  ? Smaller portions   ? Continues to be alcohol free  ? Continues to drink slower   ?  fluids from meal time for most part (might have a sip here/there) - at about 20 minutes with none right now    Protein sources: pb, yogurt, cheese, chicken and turkey     Recent recall:  - Banana  - Mixed fruit  - Cheese   - 2 Tbsp pb on celery   - Vegetable Soup   - 1 slice of pepperoni pizza     PA: walking lots    April 2022:    Pt shared she has not had a BM since Friday. Thinks it is related to the Rybelsus (Semglatuide) she recently started. She is hoping to stop the medication. Scared to eat right now due to being backed up.     Didn t eat anything yesterday - today only had two pickles thus far.      Continues to do well with dietary goals and weight loss. However, pt is struggling with changes in her body and  saggy skin . Pt concerned about how she will look going forward. She is not sure how to proceed right now - hesitant  but also not wanting to give up.    Has found that if she overeats she will vomit - stomach is helping her know when to stop.     May 2022:  Was struggling with night eating - better now. Increased dose of medication and none since.    Continues to do well with goals -  fluids during meal time, chewing well, setting utensil down, making good food choices.    Has practiced eating on smaller plates (saucer size)    Doing well resisting temptations.     Fluids: water, 1 coke zero/day, V8 juice occ, sugar-free flavorings in water    BM - was constipated but made some changes (more fiber, etc)     Nicotine: none - was using patches but stopped     PA: walking or moving around house    Previous goals:  1. Track protein intake for a few days (goal from last month) - Did not track. Trying to do more protein.     2. Keep up the great work!    - No alcohol - Met   - No smoking - Met   -  fluids from meal time - Met, drinks about 15 min after eating.    - Sipping fluids - Drinking water all day long   - Chewing/pace - Met    Recall Diet Questions Reviewed With Patient 1/19/2022   Describe what you typically consume for breakfast (typical or most recent): I usually don't eat much for breakfast maybe a piece of toast.   Describe what you typically consume for lunch (typical or most recent): Sometimes hot dogs or macaroni and cheese or hamburgers or fast food or eggs or just about anything   Describe what you typically consume for supper (typical or most recent): Always have to have meat with my dinner potato vegetable if it's corn or green beans but I prefer peas and then I snack at night time before bed because I'm so hungry   Describe what you typically consume as snacks (typical or most recent): Cereal bars chips sometimes fruit a hamburger fast food   How many ounces of water, or other low calorie drinks, do you drink daily (8 oz=1 glass)? 48 oz   How many ounces of caffeine (coffee, tea, pop) do you  drink daily (8 oz=1 glass)? 8 oz   How many ounces of carbonated (pop, beer, sparkling water) drinks do you drinky daily (8 oz=1 glass)? 8 oz   How many ounces of juice, pop, sweet tea, sports drinks, protein drinks, other sweetened drinks, do you drink daily (8 oz=1 glass)? 24 oz   How many ounces of milk do you drink daily (8 oz=1 glass) 8 oz   Please indicate the type of milk: 2%   How often do you drink alcohol? Never   If you do drink alcohol, how many drinks might you have in a day? (one drink = 5 oz. wine, 1 can/bottle of beer, 1 shot liquor) -       Eating Habits 1/19/2022   Do you have any dietary restrictions? Yes   Do you currently binge eat (eat a large amount of food in a short time)? -   Are you an emotional eater? No   Do you get up to eat after falling asleep? Yes   What foods do you crave? I crave Pizza macaroni and cheese [employer] hamburger french fries but I do still eat my vegetables     Dining Out History Reviewed With Patient 1/19/2022   How often do you dine out? A couple of times a week.   Where do you dine out? (select all that apply) fast food chains, take out   What types of food do you order when you dine out? Some type of meat potato and of course you got to have a green beans or corn       Physical Activity Reviewed With Patient 2/22/2022   How often do you exercise? Daily   What is the duration of your exercise (in minutes)? 30 Minutes   What types of exercise do you do? walking, other   What keeps you from being more active?  I am as active as I can possbily be       NUTRITION DIAGNOSIS:  Obesity r/t long history of positive energy balance aeb BMI >30 kg/m2.    INTERVENTION:  Intervention Provided/Education Provided on post-op diet guidelines, vitamins/minerals essential post-operatively, GI anatomy of bariatric surgeries, ways to help prepare for post-op diet guidelines pre-operatively, portion/calorie-control, mindful eating and sources of protein.  Patient demonstrates understanding.  Provided pt with list of goals RD contact information.      Questions Reviewed With Patient 1/19/2022   How ready are you to make changes regarding your weight? Number 1 = Not ready at all to make changes up to 10 = very ready. 10   How confident are you that you can change? 1 = Not confident that you will be successful making changes up to 10 = very confident. 10       Expected Engagement: good    GOALS:  1. Keep up the great work!    - No alcohol   - No smoking   - Seperating fluids from meal time    - Sipping fluids   - Chewing/pace   - No carbonation after surgery    High Fiber Foods:  Bran cereal, 1/3 cup, 8.6 gm fiber   Cooked kidney beans   cup 7.9 gm  Cooked lentils   cup 7.8 gm  Cooked black beans   cup 7.6 gm  Canned chickpeas   cup 5.3 gm  Baked beans   cup 5.2 gm  Pear 1 5.1 gm  Soybeans   cup 5.1 gm  Quinoa   cup 5 gm  Baked sweet potato, with skin 1 medium 4.8 gm  Baked potato, with skin 1 medium 4.4 gm  Cooked frozen green peas   cup 4.4 gm  Bulgur   cup 4.1 gm  Cooked frozen mixed vegetables   cup 4 gm  Raspberries   cup 4 gm  Blackberries   cup 3.8 gm  Almonds 1 oz 3.5 gm  Cooked frozen spinach   cup 3.5 gm  Vegetable or soy yony 1 each 3.4 gm  Apple 1 medium 3.3 gm  Dried dates 5 pieces 3.3 gm      Surgery resources:  Diet Guidelines after Weight Loss Surgery  [email or web address]     Post-op Diet Advancement Schedule:  Clear Liquid Diet (stage 1):  TBD   *No RED, PROTEIN or PULP day before surgery  Low-Fat Full Liquid Diet (stage 2): TBD (2 weeks total between clear/liquid)  Pureed Diet (stage 3): TBD (2 weeks)  Soft Diet (stage 4): TBD (4 weeks)  Regular Diet (stage 5): TBD     Take the following after a Sleeve Gastrectomy:  - Multivitamin/minerals: adult dose 1-2 times daily  *Will need chewable for 3 months  - Iron: 18+ mg/day - may partly or fully be covered in multivitamin   - Calcium Citrate containing vitamin D: 500 mg 3 times daily or 600 mg 2 times daily              - Separate  the calcium from your multivitamin or iron by at least 2 hours.               - Must be a chewable calcium citrate until post-op 3 months               - Options for calcium citrate: Beni calcium citrate chewable, bariatric advantage calcium citrate chewable, Celebrate vitamins calcium citrate chewable, Bariatric Fusion calcium citrate chewable  - Vitamin D - at least 3,000 international units/day between all supplements  - Vitamin B12: sublingual form of at least 500 mcg daily or injection of 1000 mcg monthly     Diabetes Plate Method:  https://www.diabetesfoodhub.org/articles/what-is-the-diabetes-plate-method.html    Your Stage 1 Diet: Clear Liquids  [email or web address]     Your Stage 2 Diet: Low-fat Full Liquids  [email or web address]     Your Stage 3 Diet: Pureed Foods  [email or web address]     Pureed Recipes  [email or web address]    Your Stage 4 Diet: Soft Foods  [email or web address]    Your Stage 5 Diet: Regular Foods  [email or web address]      Follow up:   Tuesday, June 14th at 1:30 pm    Time spent with patient: minutes.  SHELLY Neal, RD, LD

## 2022-05-24 DIAGNOSIS — F41.9 ANXIETY: ICD-10-CM

## 2022-05-24 DIAGNOSIS — L30.9 ECZEMA, UNSPECIFIED TYPE: ICD-10-CM

## 2022-05-25 ENCOUNTER — TELEPHONE (OUTPATIENT)
Dept: INTERNAL MEDICINE | Facility: CLINIC | Age: 52
End: 2022-05-25

## 2022-05-25 ENCOUNTER — OFFICE VISIT (OUTPATIENT)
Dept: ORTHOPEDICS | Facility: CLINIC | Age: 52
End: 2022-05-25
Payer: COMMERCIAL

## 2022-05-25 VITALS
HEIGHT: 63 IN | BODY MASS INDEX: 40.57 KG/M2 | SYSTOLIC BLOOD PRESSURE: 104 MMHG | WEIGHT: 229 LBS | DIASTOLIC BLOOD PRESSURE: 66 MMHG

## 2022-05-25 DIAGNOSIS — M25.511 ACUTE PAIN OF RIGHT SHOULDER: Primary | ICD-10-CM

## 2022-05-25 DIAGNOSIS — M54.12 CERVICAL RADICULOPATHY: ICD-10-CM

## 2022-05-25 DIAGNOSIS — M54.2 CHRONIC NECK PAIN: ICD-10-CM

## 2022-05-25 DIAGNOSIS — G89.29 CHRONIC NECK PAIN: ICD-10-CM

## 2022-05-25 PROCEDURE — 99203 OFFICE O/P NEW LOW 30 MIN: CPT | Performed by: ORTHOPAEDIC SURGERY

## 2022-05-25 RX ORDER — HYDROCORTISONE 2.5 %
CREAM (GRAM) TOPICAL
Qty: 28.35 G | Refills: 1 | Status: SHIPPED | OUTPATIENT
Start: 2022-05-25 | End: 2022-08-21

## 2022-05-25 RX ORDER — HYDROXYZINE HYDROCHLORIDE 50 MG/1
TABLET, FILM COATED ORAL
Qty: 70 TABLET | Refills: 1 | Status: SHIPPED | OUTPATIENT
Start: 2022-05-25 | End: 2022-06-15

## 2022-05-25 NOTE — PROGRESS NOTES
HISTORY OF PRESENT ILLNESS:    Swetha Patterson is a 52 year old female who is seen as self referral for right shoulder pain.  Onset of symptoms chronic neck pain, with acute flare up of pain ~2 weeks ago.  She is right hand dominant.   She reports history of previous cervical fusion at [facility], but did not wish to continue care with [facility]. Patient has had previous left shoulder surgery with Dr. Milian, and wished to establish care regarding right upper extremity symptoms.     Present symptoms: Right lateral sided neck pain, radiation of pain along the superior shoulder down the arm into her fingers.  She reports tingling sensation, especially at nighttime. She reports shooting pain down the arm at nighttime when laying, trying to find comfortable position. Pain with raising arm overhead, this pain is located to the superior shoulder.     She indicates that after the C5-6  fusion in 2018, her symptoms of hand paresthesia did not go away.  She was told that there were additional issues at C7- T1.  She remembers having undergone EMG evaluation after the neck surgery.  The issue that she is complaining of is continuation of what she was dealing with from 2018.      Patient reports weakness of the right upper extremity. She reports frequent dropping of items.   Treatments tried to this point: home exercises: walk walks, range of motion,  No use of OTC or prescribed pain medication.    Orthopedic PMH: cervical fusion C5-C6, left rotator cuff repair 2015    Past Medical History:   Diagnosis Date     Anxiety state, unspecified      Asthma      Chronic pain     back pain from cyst     Contact dermatitis and other eczema, due to unspecified cause      COPD (chronic obstructive pulmonary disease) (H)      Depressive disorder, not elsewhere classified      Diabetes (H)      Emphysema with chronic bronchitis (H)      Esophageal reflux      Family history of ischemic heart disease      Fibromyalgia      Gastro-oesophageal reflux disease   "    Heart disease     has chest pain sometimes     History of emphysema      Hoarseness      HTN, goal below 140/90      Hyperlipidemia LDL goal <130      Liver disease     \"fatty liver\"     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)      PONV (postoperative nausea and vomiting)      Rectal bleeding        Past Surgical History:   Procedure Laterality Date     ANESTHESIA OUT OF OR MRI N/A 10/7/2021    Procedure: ANESTHESIA OUT OF OR MRI;  Surgeon: GENERIC ANESTHESIA PROVIDER;  Location: [facility]     ARTHROSCOPY SHOULDER DECOMPRESSION Left 10/21/2015    Procedure: ARTHROSCOPY SHOULDER DECOMPRESSION;  Surgeon: Julien Milian MD;  Location: [facility] OR     BIOPSY ARTERY TEMPORAL Left 3/11/2020    Procedure: LEFT TEMPORAL ARTERY BIOPSY;  Surgeon: Ibeth Conner MD;  Location: [facility]     BRONCHIAL THERMOPLASTY N/A 11/14/2014    Procedure: BRONCHIAL THERMOPLASTY;  Surgeon: Ward Whitaker MD;  Location: [facility]     BRONCHIAL THERMOPLASTY N/A 12/19/2014    Procedure: BRONCHIAL THERMOPLASTY;  Surgeon: Ward Whitaker MD;  [facility]     BRONCHIAL THERMOPLASTY N/A 2/6/2015    Procedure: BRONCHIAL THERMOPLASTY;  Surgeon: Ward Whitaker MD;  [facility]     COLONOSCOPY N/A 11/26/2018    Procedure: COLONOSCOPY (MN);  Surgeon: Marshall Oakes MD;  Location: [facility]     COLONOSCOPY N/A 10/22/2020    Procedure: Colonoscopy;  Surgeon: Marshall Mccormick MD;  Location: [facility]     DISCECTOMY, FUSION CERVICAL ANTERIOR ONE LEVEL, COMBINED N/A 5/1/2018    Procedure: COMBINED DISCECTOMY, FUSION CERVICAL ANTERIOR ONE LEVEL;  1.  C5-C6 anterior cervical diskectomy and fusion.    2.  C5-C6 application of intervertebral biomechanical device for interbody fusion purposes.    3.  C5-C6 anterior instrumentation using the standard Kristin InViZia 24 mm plate with four associated bone screws, 12 mm in length.  Inferior screws are fixed.  Superior screws are va     ENT SURGERY  2015    polyps removed from vocal cords "      ESOPHAGOSCOPY, GASTROSCOPY, DUODENOSCOPY (EGD), COMBINED N/A 10/22/2020    Procedure: Esophagoscopy, gastroscopy, duodenoscopy with biopsies;  Surgeon: Marshall Mccormick MD;  Location: [facility]     ESOPHAGOSCOPY, GASTROSCOPY, DUODENOSCOPY (EGD), COMBINED N/A 6/17/2021    Procedure: ESOPHAGOGASTRODUODENOSCOPY, WITH BIOPSY;  Surgeon: Darrel Damon MD;  Location:  GI     EXCISE MASS TRUNK Left 11/3/2021    Procedure: EXCISION LEFT SHOULDER LIPOMA;  Surgeon: Ibeth Conner MD;  Location:  OR     EXCISE NODE MEDIASTINAL  4/26/2013    Procedure: EXCISE NODE MEDIASTINAL;;  Surgeon: Av Peña MD;  Location:  OR     LAPAROSCOPIC CHOLECYSTECTOMY N/A 10/19/2017    Procedure: LAPAROSCOPIC CHOLECYSTECTOMY;  LAPAROSCOPIC CHOLECYSTECTOMY;  Surgeon: Ney Jerry MD;  Location:  OR     THORACOSCOPY  4/26/2013    Procedure: THORACOSCOPY;  LEFT VIDEO ASSISTED THORACOSCOPY, RESECTION OF POSTERIOR MEDIASTINAL MASS;  Surgeon: Av Peña MD;  Location:  OR     TONSILLECTOMY  as a kid     TONSILLECTOMY       ZZC APPENDECTOMY  at age 17       Family History   Problem Relation Age of Onset     Heart Disease Mother         murmur, mi     Hypertension Mother      Cerebrovascular Disease Mother      Depression Mother      Gallbladder Disease Mother      Asthma Mother      Family History Negative Father         does not know  him     Family History Negative Brother      Heart Disease Maternal Grandfather      Asthma Maternal Grandfather      Hypertension Maternal Grandfather      Cerebrovascular Disease Maternal Grandfather      Diabetes Maternal Grandfather      Asthma Sister      Hypertension Sister      Cerebrovascular Disease Sister      Hypertension Maternal Grandmother      Cerebrovascular Disease Maternal Grandmother        Social History     Socioeconomic History     Marital status:      Spouse name: Not on file     Number of children: Not on file     Years of education: Not  on file     Highest education level: Not on file   Occupational History     Not on file   Tobacco Use     Smoking status: Former Smoker     Packs/day: 0.50     Years: 30.00     Pack years: 15.00     Types: Cigarettes     Start date: 1/1/1985     Smokeless tobacco: Former User     Quit date: 1/6/2022     Tobacco comment: smokes 1 cigarettes a day - in the process of trying to quit   Vaping Use     Vaping Use: Never used   Substance and Sexual Activity     Alcohol use: Never     Drug use: No     Sexual activity: Not Currently     Partners: Male     Birth control/protection: None   Other Topics Concern      Service Not Asked     Blood Transfusions Not Asked     Caffeine Concern No     Comment: 4-5 cans of pop daily     Occupational Exposure Not Asked     Hobby Hazards Not Asked     Sleep Concern Not Asked     Stress Concern Not Asked     Weight Concern Not Asked     Special Diet No     Back Care Not Asked     Exercise No     Comment: on hold     Bike Helmet Not Asked     Seat Belt Not Asked     Self-Exams Not Asked     Parent/sibling w/ CABG, MI or angioplasty before 65F 55M? Not Asked   Social History Narrative     Not on file     Social Determinants of Health     Financial Resource Strain: Not on file   Food Insecurity: Not on file   Transportation Needs: Not on file   Physical Activity: Not on file   Stress: Not on file   Social Connections: Not on file   Intimate Partner Violence: Not on file   Housing Stability: Not on file       Current Outpatient Medications   Medication Sig Dispense Refill     albuterol (PROVENTIL) (2.5 MG/3ML) 0.083% neb solution INHALE ONE VIAL BY NEBULIZER EVERY 6 HOURS AS NEEDED FOR SHORTNESS OF BREATH OR WHEEZING 75 mL 3     albuterol (VENTOLIN HFA) 108 (90 Base) MCG/ACT inhaler Inhale 2 puffs into the lungs every 6 hours 3 each 3     amLODIPine (NORVASC) 2.5 MG tablet Take 2 tablets (5 mg) by mouth daily 180 tablet 2     atorvastatin (LIPITOR) 80 MG tablet TAKE ONE TABLET BY MOUTH  EVERY DAY 90 tablet 3     benzoyl peroxide (ACNE-CLEAR) 10 % external gel Apply topically 2 times daily as needed 90 g 1     blood glucose (ACCU-CHEK ALLYSON PLUS) test strip USE TO TEST BLOOD SUGAR ONCE DAILY OR AS DIRECTED 100 strip 3     blood glucose (NO BRAND SPECIFIED) lancets standard Use to test blood sugar 1 times daily or as directed. 100 each 3     blood glucose (NO BRAND SPECIFIED) test strip Use to test blood sugar 1 times daily or as directed.  Dispense Accu-chek Allyson Plus or per patient insurance 100 strip 3     blood glucose monitoring (NO BRAND SPECIFIED) meter device kit Use to test blood sugar 1 times daily or as directed.  Dispense Accu-chek Allyson Plus or per insurance preference 1 kit 0     blood glucose monitoring (NO BRAND SPECIFIED) meter device kit Use to test blood sugar 1 times daily or as directed. 1 kit 0     budesonide-formoterol (SYMBICORT) 160-4.5 MCG/ACT Inhaler Inhale 2 puffs into the lungs 2 times daily 30.6 g 3     buPROPion (WELLBUTRIN XL) 150 MG 24 hr tablet TAKE ONE TABLET BY MOUTH EVERY MORNING 90 tablet 2     cetirizine (ZYRTEC) 10 MG tablet TAKE ONE TABLET BY MOUTH EVERY DAY AS NEEDED 90 tablet 3     cetirizine HCl 10 MG CAPS Take 1 capsule (10 mg) by mouth daily as needed Takes in the spring. 90 capsule 3     clindamycin (CLINDAMAX) 1 % external gel Apply topically 2 times daily 60 g 3     clonazePAM (KLONOPIN) 0.5 MG tablet Take 0.5 mg by mouth daily as needed        clonazePAM (KLONOPIN) 1 MG tablet Take 1 tablet (1 mg) by mouth 2 times daily as needed for anxiety She needs to see her PCP to get more tablets 5 tablet 0     Emollient (CERAVE MOISTURIZING) CREA Externally apply 1 Application topically 2 times daily 453 g 11     fluticasone (FLONASE) 50 MCG/ACT nasal spray Spray 2 sprays in nostril daily (Patient taking differently: Spray 2 sprays in nostril daily as needed) 16 g 5     furosemide (LASIX) 20 MG tablet TAKE ONE TABLET BY MOUTH TWICE A  tablet 3      hydrocortisone 2.5 % cream APPLY TOPICALLY TO THE AFFECTED AREA(S) TWO TIMES A DAY 28.35 g 1     hydrOXYzine (ATARAX) 50 MG tablet TAKE TWO TABLETS BY MOUTH THREE TIMES A DAY AS NEEDED FOR ANXIETY 70 tablet 1     lamoTRIgine (LAMICTAL) 150 MG tablet Take 150 mg by mouth daily       lamoTRIgine (LAMICTAL) 200 MG tablet Take 200 mg by mouth daily       lisinopril (ZESTRIL) 20 MG tablet TAKE ONE TABLET BY MOUTH EVERY DAY 90 tablet 0     metFORMIN (GLUCOPHAGE) 500 MG tablet TAKE ONE TABLET BY MOUTH EVERY DAY WITH BREAKFAST 90 tablet 1     minoxidil (ROGAINE) 5 % external solution apply per package directions to the scalp once daily 120 mL 3     montelukast (SINGULAIR) 10 MG tablet TAKE ONE TABLET BY MOUTH AT BEDTIME 90 tablet 0     multivitamin, therapeutic (THERA-VIT) TABS tablet Take 1 tablet by mouth daily       naltrexone (DEPADE/REVIA) 50 MG tablet Take 1/2 tablet once daily 1-2 hours prior to worst cravings for 1 week, then increase to 1 tablet daily as directed if tolerating 30 tablet 3     nicotine (NICODERM CQ) 14 MG/24HR 24 hr patch Place 1 patch onto the skin every 24 hours 28 patch 1     nicotine (NICORETTE) 2 MG gum Place 1 each (2 mg) inside cheek every hour as needed for smoking cessation 240 each 1     nitroGLYcerin (NITROSTAT) 0.4 MG sublingual tablet PLACE ONE TABLET UNDER THE TONGUE AT THE 1ST SIGN OF ATTACK. IF PAIN IS UNRELIEVED OR WORSENED 5 MINUTES AFTER 1ST DOSE, PROMPT MEDICAL ASSISTANCE IS NEEDED. MAY REPEAT EVERY 5 MINUTES UNTIL PAIN IS RELIEVED. MAX OF THREE DOSES 25 tablet 11     nystatin (MYCOSTATIN) 523081 UNIT/GM external powder APPLY TOPICALLY TWICE A DAY AS NEEDED SKIN RASH 45 g 3     omeprazole (PRILOSEC) 20 MG DR capsule TAKE ONE CAPSULE BY MOUTH TWO TIMES A  capsule 1     ondansetron (ZOFRAN) 8 MG tablet TAKE ONE-HALF TO ONE TABLET BY MOUTH EVERY 8 HOURS AS NEEDED FOR NAUSEA 30 tablet 1     ondansetron (ZOFRAN-ODT) 4 MG ODT tab Take 1 tablet (4 mg) by mouth every 6 hours as  needed for nausea 12 tablet 1     polyethylene glycol (MIRALAX) 17 GM/Dose powder Take 17 g (1 capful) by mouth daily 507 g 1     Semaglutide 3 MG TABS Take 3 mg by mouth daily 30 tablet 1     Semaglutide 7 MG TABS Take 7 mg by mouth daily 30 tablet 3     sulfamethoxazole-trimethoprim (BACTRIM DS) 800-160 MG tablet Take 1 tablet by mouth 2 times daily Take one tablet twice daily. For additional refills, please schedule a follow-up appointment. 180 tablet 1     terbinafine (LAMISIL) 1 % external cream APPLY TO AFFECTED AREA(S) TWO TIMES A DAY 30 g 2     triamcinolone (KENALOG) 0.025 % external ointment Apply topically 2 times daily 80 g 1     Vitamin D3 (CHOLECALCIFEROL) 125 MCG (5000 UT) tablet Take 1 tablet by mouth three times a week         Allergies   Allergen Reactions     Codeine Nausea and Vomiting     vomiting     Cyclobenzaprine Nausea     Gabapentin Rash     Hydrocodone Nausea and Vomiting     Sulfa Drugs Nausea and Vomiting     Nausea       REVIEW OF SYSTEMS:  CONSTITUTIONAL:  NEGATIVE for fever, chills, change in weight  INTEGUMENTARY/SKIN:  NEGATIVE for worrisome rashes, moles or lesions  EYES:  NEGATIVE for vision changes or irritation  ENT/MOUTH:  NEGATIVE for ear, mouth and throat problems  RESP:  NEGATIVE for significant cough or SOB  BREAST:  NEGATIVE for masses, tenderness or discharge  CV:  NEGATIVE for chest pain, palpitations or peripheral edema  GI:  NEGATIVE for nausea, abdominal pain, heartburn, or change in bowel habits  :  Negative   MUSCULOSKELETAL:  See HPI above  NEURO:  NEGATIVE for weakness, dizziness or paresthesias  ENDOCRINE:  NEGATIVE for temperature intolerance, skin/hair changes  HEME/ALLERGY/IMMUNE:  NEGATIVE for bleeding problems  PSYCHIATRIC:  NEGATIVE for changes in mood or affect      PHYSICAL EXAM:  /66   LMP 04/15/2016 (Exact Date)   There is no height or weight on file to calculate BMI.   GENERAL APPEARANCE: healthy, alert and no distress   HEENT: No apparent  thyroid megaly. Clear sclera with normal ocular movement  RESPIRATORY: No labored breathing  SKIN: no suspicious lesions or rashes  NEURO: Normal strength and tone, mentation intact and speech normal  VASCULAR: Good pulses, and capillary refill   LYMPH: no lymphadenopathy   PSYCH:  mentation appears normal and affect normal/bright    MUSCULOSKELETAL:  Not in acute distress  Normal gait  Some pain around the neck with cervical range of motion but no specific radiculopathy pain from neck range of motion    Right shoulder range of motion is well-maintained  No specific tenderness around the shoulder other than right trapezius    No focal atrophy in the hands    Right shoulder isometric strength is full in all directions  Negative arm drop test  Negative impingement sign  Negative belly press test    Elbow flexion extension range of motion is full with full strength   strength is grossly intact     ASSESSMENT:  Chronic right shoulder and upper extremity pain most likely from cervical pathology rather than shoulder pathology    PLAN:  The x-rays of right shoulder from today were visualized.  Other than mild AC joint degenerative changes she has well-maintained glenohumeral joint space and if no significant osseous morphology causing impingement of the subacromial arch.      It does not appear that what ever is going on in the shoulder can explain her chronic right upper extremity symptoms.    My recommendation at this point is for her to go through neurosurgical evaluation.    We talked about subacromial cortisone injection but she is not interested in that.          Imaging Interpretation:   Unremarkable other than mild AC joint DJD changes    Julien Milian MD  Department of Orthopedic Surgery        Disclaimer: This note consists of symbols derived from keyboarding, dictation and/or voice recognition software. As a result, there may be errors in the script that have gone undetected. Please consider this when  interpreting information found in this chart.

## 2022-05-25 NOTE — LETTER
5/25/2022         RE: Swetha Patterson  [id number]  [facility] [place] [address]        Dear Colleague,    Thank you for referring your patient, Swetha Patterson, to the [facility] [place]. Please see a copy of my visit note below.    HISTORY OF PRESENT ILLNESS:    Swetha Patterson is a 52 year old female who is seen as self referral for right shoulder pain.  Onset of symptoms chronic neck pain, with acute flare up of pain ~2 weeks ago.  She is right hand dominant.   She reports history of previous cervical fusion at [facility], but did not wish to continue care with [facility]. Patient has had previous left shoulder surgery with Dr. Milian, and wished to establish care regarding right upper extremity symptoms.     Present symptoms: Right lateral sided neck pain, radiation of pain along the superior shoulder down the arm into her fingers.  She reports tingling sensation, especially at nighttime. She reports shooting pain down the arm at nighttime when laying, trying to find comfortable position. Pain with raising arm overhead, this pain is located to the superior shoulder.     She indicates that after the C5-6  fusion in 2018, her symptoms of hand paresthesia did not go away.  She was told that there were additional issues at C7- T1.  She remembers having undergone EMG evaluation after the neck surgery.  The issue that she is complaining of is continuation of what she was dealing with from 2018.      Patient reports weakness of the right upper extremity. She reports frequent dropping of items.   Treatments tried to this point: home exercises: walk walks, range of motion,  No use of OTC or prescribed pain medication.    Orthopedic PMH: cervical fusion C5-C6, left rotator cuff repair 2015    Past Medical History:   Diagnosis Date     Anxiety state, unspecified      Asthma      Chronic pain     back pain from cyst     Contact dermatitis and other eczema, due to unspecified cause      COPD (chronic  "obstructive pulmonary disease) (H)      Depressive disorder, not elsewhere classified      Diabetes (H)      Emphysema with chronic bronchitis (H)      Esophageal reflux      Family history of ischemic heart disease      Fibromyalgia      Gastro-oesophageal reflux disease      Heart disease     has chest pain sometimes     History of emphysema      Hoarseness      HTN, goal below 140/90      Hyperlipidemia LDL goal <130      Liver disease     \"fatty liver\"     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)      PONV (postoperative nausea and vomiting)      Rectal bleeding        Past Surgical History:   Procedure Laterality Date     ANESTHESIA OUT OF OR MRI N/A 10/7/2021    Procedure: ANESTHESIA OUT OF OR MRI;  Surgeon: GENERIC ANESTHESIA PROVIDER;  Location: [facility] OR     ARTHROSCOPY SHOULDER DECOMPRESSION Left 10/21/2015    Procedure: ARTHROSCOPY SHOULDER DECOMPRESSION;  Surgeon: Julien Milian MD;  Location: [facility] OR     BIOPSY ARTERY TEMPORAL Left 3/11/2020    Procedure: LEFT TEMPORAL ARTERY BIOPSY;  Surgeon: Ibeth Conner MD;  Location: [facility] OR     BRONCHIAL THERMOPLASTY N/A 11/14/2014    Procedure: BRONCHIAL THERMOPLASTY;  Surgeon: Ward Whitaker MD;  Location: [facility] GI     BRONCHIAL THERMOPLASTY N/A 12/19/2014    Procedure: BRONCHIAL THERMOPLASTY;  Surgeon: Ward Whitaker MD;  [facility]: [facility]     BRONCHIAL THERMOPLASTY N/A 2/6/2015    Procedure: BRONCHIAL THERMOPLASTY;  Surgeon: Ward Whitaker MD;  Location: [facility]     COLONOSCOPY N/A 11/26/2018    Procedure: COLONOSCOPY ([facility]);  Surgeon: Marshall Oakes MD;  Location: [facility] OR     COLONOSCOPY N/A 10/22/2020    Procedure: Colonoscopy;  Surgeon: Marshall Mccormick MD;  Location: [facility] OR     DISCECTOMY, FUSION CERVICAL ANTERIOR ONE LEVEL, COMBINED N/A 5/1/2018    Procedure: COMBINED DISCECTOMY, FUSION CERVICAL ANTERIOR ONE LEVEL;  1.  C5-C6 anterior cervical diskectomy and fusion.    2.  C5-C6 application of intervertebral biomechanical device " for interbody fusion purposes.    3.  C5-C6 anterior instrumentation using the standard Kristin InViZia 24 mm plate with four associated bone screws, 12 mm in length.  Inferior screws are fixed.  Superior screws are va     ENT SURGERY  2015    polyps removed from vocal cords      ESOPHAGOSCOPY, GASTROSCOPY, DUODENOSCOPY (EGD), COMBINED N/A 10/22/2020    Procedure: Esophagoscopy, gastroscopy, duodenoscopy with biopsies;  Surgeon: Marshall Mccormick MD;  Location:  OR     ESOPHAGOSCOPY, GASTROSCOPY, DUODENOSCOPY (EGD), COMBINED N/A 6/17/2021    Procedure: ESOPHAGOGASTRODUODENOSCOPY, WITH BIOPSY;  Surgeon: Darrel Damon MD;  Location:  GI     EXCISE MASS TRUNK Left 11/3/2021    Procedure: EXCISION LEFT SHOULDER LIPOMA;  Surgeon: Ibeth Conner MD;  Location:  OR     EXCISE NODE MEDIASTINAL  4/26/2013    Procedure: EXCISE NODE MEDIASTINAL;;  Surgeon: Av Peña MD;  Location:  OR     LAPAROSCOPIC CHOLECYSTECTOMY N/A 10/19/2017    Procedure: LAPAROSCOPIC CHOLECYSTECTOMY;  LAPAROSCOPIC CHOLECYSTECTOMY;  Surgeon: Ney Jerry MD;  Location:  OR     THORACOSCOPY  4/26/2013    Procedure: THORACOSCOPY;  LEFT VIDEO ASSISTED THORACOSCOPY, RESECTION OF POSTERIOR MEDIASTINAL MASS;  Surgeon: Av Peña MD;  Location:  OR     TONSILLECTOMY  as a kid     TONSILLECTOMY       ZZC APPENDECTOMY  at age 17       Family History   Problem Relation Age of Onset     Heart Disease Mother         murmur, mi     Hypertension Mother      Cerebrovascular Disease Mother      Depression Mother      Gallbladder Disease Mother      Asthma Mother      Family History Negative Father         does not know  him     Family History Negative Brother      Heart Disease Maternal Grandfather      Asthma Maternal Grandfather      Hypertension Maternal Grandfather      Cerebrovascular Disease Maternal Grandfather      Diabetes Maternal Grandfather      Asthma Sister      Hypertension Sister       Cerebrovascular Disease Sister      Hypertension Maternal Grandmother      Cerebrovascular Disease Maternal Grandmother        Social History     Socioeconomic History     Marital status:      Spouse name: Not on file     Number of children: Not on file     Years of education: Not on file     Highest education level: Not on file   Occupational History     Not on file   Tobacco Use     Smoking status: Former Smoker     Packs/day: 0.50     Years: 30.00     Pack years: 15.00     Types: Cigarettes     Start date: 1/1/1985     Smokeless tobacco: Former User     Quit date: 1/6/2022     Tobacco comment: smokes 1 cigarettes a day - in the process of trying to quit   Vaping Use     Vaping Use: Never used   Substance and Sexual Activity     Alcohol use: Never     Drug use: No     Sexual activity: Not Currently     Partners: Male     Birth control/protection: None   Other Topics Concern      Service Not Asked     Blood Transfusions Not Asked     Caffeine Concern No     Comment: 4-5 cans of pop daily     Occupational Exposure Not Asked     Hobby Hazards Not Asked     Sleep Concern Not Asked     Stress Concern Not Asked     Weight Concern Not Asked     Special Diet No     Back Care Not Asked     Exercise No     Comment: on hold     Bike Helmet Not Asked     Seat Belt Not Asked     Self-Exams Not Asked     Parent/sibling w/ CABG, MI or angioplasty before 65F 55M? Not Asked   Social History Narrative     Not on file     Social Determinants of Health     Financial Resource Strain: Not on file   Food Insecurity: Not on file   Transportation Needs: Not on file   Physical Activity: Not on file   Stress: Not on file   Social Connections: Not on file   Intimate Partner Violence: Not on file   Housing Stability: Not on file       Current Outpatient Medications   Medication Sig Dispense Refill     albuterol (PROVENTIL) (2.5 MG/3ML) 0.083% neb solution INHALE ONE VIAL BY NEBULIZER EVERY 6 HOURS AS NEEDED FOR SHORTNESS OF  BREATH OR WHEEZING 75 mL 3     albuterol (VENTOLIN HFA) 108 (90 Base) MCG/ACT inhaler Inhale 2 puffs into the lungs every 6 hours 3 each 3     amLODIPine (NORVASC) 2.5 MG tablet Take 2 tablets (5 mg) by mouth daily 180 tablet 2     atorvastatin (LIPITOR) 80 MG tablet TAKE ONE TABLET BY MOUTH EVERY DAY 90 tablet 3     benzoyl peroxide (ACNE-CLEAR) 10 % external gel Apply topically 2 times daily as needed 90 g 1     blood glucose (ACCU-CHEK ALLYSON PLUS) test strip USE TO TEST BLOOD SUGAR ONCE DAILY OR AS DIRECTED 100 strip 3     blood glucose (NO BRAND SPECIFIED) lancets standard Use to test blood sugar 1 times daily or as directed. 100 each 3     blood glucose (NO BRAND SPECIFIED) test strip Use to test blood sugar 1 times daily or as directed.  Dispense Accu-chek Allyson Plus or per patient insurance 100 strip 3     blood glucose monitoring (NO BRAND SPECIFIED) meter device kit Use to test blood sugar 1 times daily or as directed.  Dispense Accu-chek Allyson Plus or per insurance preference 1 kit 0     blood glucose monitoring (NO BRAND SPECIFIED) meter device kit Use to test blood sugar 1 times daily or as directed. 1 kit 0     budesonide-formoterol (SYMBICORT) 160-4.5 MCG/ACT Inhaler Inhale 2 puffs into the lungs 2 times daily 30.6 g 3     buPROPion (WELLBUTRIN XL) 150 MG 24 hr tablet TAKE ONE TABLET BY MOUTH EVERY MORNING 90 tablet 2     cetirizine (ZYRTEC) 10 MG tablet TAKE ONE TABLET BY MOUTH EVERY DAY AS NEEDED 90 tablet 3     cetirizine HCl 10 MG CAPS Take 1 capsule (10 mg) by mouth daily as needed Takes in the spring. 90 capsule 3     clindamycin (CLINDAMAX) 1 % external gel Apply topically 2 times daily 60 g 3     clonazePAM (KLONOPIN) 0.5 MG tablet Take 0.5 mg by mouth daily as needed        clonazePAM (KLONOPIN) 1 MG tablet Take 1 tablet (1 mg) by mouth 2 times daily as needed for anxiety She needs to see her PCP to get more tablets 5 tablet 0     Emollient (CERAVE MOISTURIZING) CREA Externally apply 1  Application topically 2 times daily 453 g 11     fluticasone (FLONASE) 50 MCG/ACT nasal spray Spray 2 sprays in nostril daily (Patient taking differently: Spray 2 sprays in nostril daily as needed) 16 g 5     furosemide (LASIX) 20 MG tablet TAKE ONE TABLET BY MOUTH TWICE A  tablet 3     hydrocortisone 2.5 % cream APPLY TOPICALLY TO THE AFFECTED AREA(S) TWO TIMES A DAY 28.35 g 1     hydrOXYzine (ATARAX) 50 MG tablet TAKE TWO TABLETS BY MOUTH THREE TIMES A DAY AS NEEDED FOR ANXIETY 70 tablet 1     lamoTRIgine (LAMICTAL) 150 MG tablet Take 150 mg by mouth daily       lamoTRIgine (LAMICTAL) 200 MG tablet Take 200 mg by mouth daily       lisinopril (ZESTRIL) 20 MG tablet TAKE ONE TABLET BY MOUTH EVERY DAY 90 tablet 0     metFORMIN (GLUCOPHAGE) 500 MG tablet TAKE ONE TABLET BY MOUTH EVERY DAY WITH BREAKFAST 90 tablet 1     minoxidil (ROGAINE) 5 % external solution apply per package directions to the scalp once daily 120 mL 3     montelukast (SINGULAIR) 10 MG tablet TAKE ONE TABLET BY MOUTH AT BEDTIME 90 tablet 0     multivitamin, therapeutic (THERA-VIT) TABS tablet Take 1 tablet by mouth daily       naltrexone (DEPADE/REVIA) 50 MG tablet Take 1/2 tablet once daily 1-2 hours prior to worst cravings for 1 week, then increase to 1 tablet daily as directed if tolerating 30 tablet 3     nicotine (NICODERM CQ) 14 MG/24HR 24 hr patch Place 1 patch onto the skin every 24 hours 28 patch 1     nicotine (NICORETTE) 2 MG gum Place 1 each (2 mg) inside cheek every hour as needed for smoking cessation 240 each 1     nitroGLYcerin (NITROSTAT) 0.4 MG sublingual tablet PLACE ONE TABLET UNDER THE TONGUE AT THE 1ST SIGN OF ATTACK. IF PAIN IS UNRELIEVED OR WORSENED 5 MINUTES AFTER 1ST DOSE, PROMPT MEDICAL ASSISTANCE IS NEEDED. MAY REPEAT EVERY 5 MINUTES UNTIL PAIN IS RELIEVED. MAX OF THREE DOSES 25 tablet 11     nystatin (MYCOSTATIN) 811760 UNIT/GM external powder APPLY TOPICALLY TWICE A DAY AS NEEDED SKIN RASH 45 g 3     omeprazole  (PRILOSEC) 20 MG DR capsule TAKE ONE CAPSULE BY MOUTH TWO TIMES A  capsule 1     ondansetron (ZOFRAN) 8 MG tablet TAKE ONE-HALF TO ONE TABLET BY MOUTH EVERY 8 HOURS AS NEEDED FOR NAUSEA 30 tablet 1     ondansetron (ZOFRAN-ODT) 4 MG ODT tab Take 1 tablet (4 mg) by mouth every 6 hours as needed for nausea 12 tablet 1     polyethylene glycol (MIRALAX) 17 GM/Dose powder Take 17 g (1 capful) by mouth daily 507 g 1     Semaglutide 3 MG TABS Take 3 mg by mouth daily 30 tablet 1     Semaglutide 7 MG TABS Take 7 mg by mouth daily 30 tablet 3     sulfamethoxazole-trimethoprim (BACTRIM DS) 800-160 MG tablet Take 1 tablet by mouth 2 times daily Take one tablet twice daily. For additional refills, please schedule a follow-up appointment. 180 tablet 1     terbinafine (LAMISIL) 1 % external cream APPLY TO AFFECTED AREA(S) TWO TIMES A DAY 30 g 2     triamcinolone (KENALOG) 0.025 % external ointment Apply topically 2 times daily 80 g 1     Vitamin D3 (CHOLECALCIFEROL) 125 MCG (5000 UT) tablet Take 1 tablet by mouth three times a week         Allergies   Allergen Reactions     Codeine Nausea and Vomiting     vomiting     Cyclobenzaprine Nausea     Gabapentin Rash     Hydrocodone Nausea and Vomiting     Sulfa Drugs Nausea and Vomiting     Nausea       REVIEW OF SYSTEMS:  CONSTITUTIONAL:  NEGATIVE for fever, chills, change in weight  INTEGUMENTARY/SKIN:  NEGATIVE for worrisome rashes, moles or lesions  EYES:  NEGATIVE for vision changes or irritation  ENT/MOUTH:  NEGATIVE for ear, mouth and throat problems  RESP:  NEGATIVE for significant cough or SOB  BREAST:  NEGATIVE for masses, tenderness or discharge  CV:  NEGATIVE for chest pain, palpitations or peripheral edema  GI:  NEGATIVE for nausea, abdominal pain, heartburn, or change in bowel habits  :  Negative   MUSCULOSKELETAL:  See HPI above  NEURO:  NEGATIVE for weakness, dizziness or paresthesias  ENDOCRINE:  NEGATIVE for temperature intolerance, skin/hair  changes  HEME/ALLERGY/IMMUNE:  NEGATIVE for bleeding problems  PSYCHIATRIC:  NEGATIVE for changes in mood or affect      PHYSICAL EXAM:  /66   LMP 04/15/2016 (Exact Date)   There is no height or weight on file to calculate BMI.   GENERAL APPEARANCE: healthy, alert and no distress   HEENT: No apparent thyroid megaly. Clear sclera with normal ocular movement  RESPIRATORY: No labored breathing  SKIN: no suspicious lesions or rashes  NEURO: Normal strength and tone, mentation intact and speech normal  VASCULAR: Good pulses, and capillary refill   LYMPH: no lymphadenopathy   PSYCH:  mentation appears normal and affect normal/bright    MUSCULOSKELETAL:  Not in acute distress  Normal gait  Some pain around the neck with cervical range of motion but no specific radiculopathy pain from neck range of motion    Right shoulder range of motion is well-maintained  No specific tenderness around the shoulder other than right trapezius    No focal atrophy in the hands    Right shoulder isometric strength is full in all directions  Negative arm drop test  Negative impingement sign  Negative belly press test    Elbow flexion extension range of motion is full with full strength   strength is grossly intact     ASSESSMENT:  Chronic right shoulder and upper extremity pain most likely from cervical pathology rather than shoulder pathology    PLAN:  The x-rays of right shoulder from today were visualized.  Other than mild AC joint degenerative changes she has well-maintained glenohumeral joint space and if no significant osseous morphology causing impingement of the subacromial arch.      It does not appear that what ever is going on in the shoulder can explain her chronic right upper extremity symptoms.    My recommendation at this point is for her to go through neurosurgical evaluation.    We talked about subacromial cortisone injection but she is not interested in that.          Imaging Interpretation:   Unremarkable other  than mild AC joint DJD changes    Julien Milian MD  Department of Orthopedic Surgery        Disclaimer: This note consists of symbols derived from keyboarding, dictation and/or voice recognition software. As a result, there may be errors in the script that have gone undetected. Please consider this when interpreting information found in this chart.        Again, thank you for allowing me to participate in the care of your patient.        Sincerely,        Juline Milian MD

## 2022-05-25 NOTE — PATIENT INSTRUCTIONS
1. Acute pain of right shoulder    2. Cervical radiculopathy    3. Chronic neck pain      Spine referral placed to establish care with [facility]. Their scheduling team will contact you to assist with scheduling this consultation.

## 2022-05-26 ENCOUNTER — TELEPHONE (OUTPATIENT)
Dept: INTERNAL MEDICINE | Facility: CLINIC | Age: 52
End: 2022-05-26
Payer: COMMERCIAL

## 2022-05-26 ENCOUNTER — APPOINTMENT (OUTPATIENT)
Dept: MRI IMAGING | Facility: CLINIC | Age: 52
End: 2022-05-26
Attending: EMERGENCY MEDICINE
Payer: COMMERCIAL

## 2022-05-26 ENCOUNTER — HOSPITAL ENCOUNTER (EMERGENCY)
Facility: CLINIC | Age: 52
Discharge: HOME | End: 2022-05-26
Attending: EMERGENCY MEDICINE | Admitting: EMERGENCY MEDICINE
Payer: COMMERCIAL

## 2022-05-26 VITALS
RESPIRATION RATE: 22 BRPM | OXYGEN SATURATION: 95 % | HEART RATE: 79 BPM | SYSTOLIC BLOOD PRESSURE: 106 MMHG | DIASTOLIC BLOOD PRESSURE: 56 MMHG | TEMPERATURE: 97.9 F

## 2022-05-26 DIAGNOSIS — M54.2 CERVICALGIA: ICD-10-CM

## 2022-05-26 DIAGNOSIS — M62.838 CERVICAL PARASPINOUS MUSCLE SPASM: ICD-10-CM

## 2022-05-26 LAB
HOLD SPECIMEN: NORMAL
HOLD SPECIMEN: NORMAL

## 2022-05-26 PROCEDURE — 255N000002 HC RX 255 OP 636: Performed by: EMERGENCY MEDICINE

## 2022-05-26 PROCEDURE — 99285 EMERGENCY DEPT VISIT HI MDM: CPT | Mod: 25

## 2022-05-26 PROCEDURE — 96375 TX/PRO/DX INJ NEW DRUG ADDON: CPT

## 2022-05-26 PROCEDURE — 96376 TX/PRO/DX INJ SAME DRUG ADON: CPT

## 2022-05-26 PROCEDURE — 250N000011 HC RX IP 250 OP 636: Performed by: EMERGENCY MEDICINE

## 2022-05-26 PROCEDURE — 96374 THER/PROPH/DIAG INJ IV PUSH: CPT | Mod: 59

## 2022-05-26 PROCEDURE — A9585 GADOBUTROL INJECTION: HCPCS | Performed by: EMERGENCY MEDICINE

## 2022-05-26 PROCEDURE — 72157 MRI CHEST SPINE W/O & W/DYE: CPT

## 2022-05-26 PROCEDURE — 72156 MRI NECK SPINE W/O & W/DYE: CPT

## 2022-05-26 RX ORDER — LORAZEPAM 2 MG/ML
1 INJECTION INTRAMUSCULAR ONCE
Status: COMPLETED | OUTPATIENT
Start: 2022-05-26 | End: 2022-05-26

## 2022-05-26 RX ORDER — HYDROMORPHONE HYDROCHLORIDE 1 MG/ML
0.5 INJECTION, SOLUTION INTRAMUSCULAR; INTRAVENOUS; SUBCUTANEOUS
Status: DISCONTINUED | OUTPATIENT
Start: 2022-05-26 | End: 2022-05-26 | Stop reason: HOSPADM

## 2022-05-26 RX ORDER — MORPHINE SULFATE 4 MG/ML
8 INJECTION, SOLUTION INTRAMUSCULAR; INTRAVENOUS
Status: COMPLETED | OUTPATIENT
Start: 2022-05-26 | End: 2022-05-26

## 2022-05-26 RX ORDER — OXYCODONE AND ACETAMINOPHEN 5; 325 MG/1; MG/1
1 TABLET ORAL EVERY 6 HOURS PRN
Qty: 12 TABLET | Refills: 0 | Status: SHIPPED | OUTPATIENT
Start: 2022-05-26 | End: 2022-05-29

## 2022-05-26 RX ORDER — PREDNISONE 20 MG/1
40 TABLET ORAL DAILY
Qty: 10 TABLET | Refills: 0 | Status: SHIPPED | OUTPATIENT
Start: 2022-05-26 | End: 2022-10-07

## 2022-05-26 RX ORDER — GADOBUTROL 604.72 MG/ML
10 INJECTION INTRAVENOUS ONCE
Status: COMPLETED | OUTPATIENT
Start: 2022-05-26 | End: 2022-05-26

## 2022-05-26 RX ORDER — CYCLOBENZAPRINE HCL 10 MG
10 TABLET ORAL 3 TIMES DAILY PRN
Qty: 20 TABLET | Refills: 0 | Status: SHIPPED | OUTPATIENT
Start: 2022-05-26 | End: 2022-06-01

## 2022-05-26 RX ADMIN — HYDROMORPHONE HYDROCHLORIDE 1 MG: 1 INJECTION, SOLUTION INTRAMUSCULAR; INTRAVENOUS; SUBCUTANEOUS at 13:09

## 2022-05-26 RX ADMIN — HYDROMORPHONE HYDROCHLORIDE 0.5 MG: 1 INJECTION, SOLUTION INTRAMUSCULAR; INTRAVENOUS; SUBCUTANEOUS at 14:06

## 2022-05-26 RX ADMIN — LORAZEPAM 1 MG: 2 INJECTION INTRAMUSCULAR; INTRAVENOUS at 17:47

## 2022-05-26 RX ADMIN — GADOBUTROL 10 ML: 604.72 INJECTION INTRAVENOUS at 18:24

## 2022-05-26 RX ADMIN — MORPHINE SULFATE 8 MG: 4 INJECTION INTRAVENOUS at 14:58

## 2022-05-26 RX ADMIN — HYDROMORPHONE HYDROCHLORIDE 0.5 MG: 1 INJECTION, SOLUTION INTRAMUSCULAR; INTRAVENOUS; SUBCUTANEOUS at 16:32

## 2022-05-26 RX ADMIN — MORPHINE SULFATE 8 MG: 4 INJECTION INTRAVENOUS at 19:25

## 2022-05-26 ASSESSMENT — ENCOUNTER SYMPTOMS
NECK PAIN: 1
MYALGIAS: 1
NUMBNESS: 1
NERVOUS/ANXIOUS: 1

## 2022-05-26 NOTE — ED PROVIDER NOTES
History   Chief Complaint:  Back Pain and Neck Pain       HPI   Swetha Patterson is a 52 year old female with history of HTN and COPD who presents with back and neck pain. Patient reports numbness of the left arm. Pain is reported in the neck and left shoulder. Her pain is exacerbated by neck movement, most-notably with flexion of the neck.  Yesterday, patient reports the right arm was going numb as well but this has resolved. She currently sees Dr. Milian with orthopedics. In 2017, she reports that she had neck surgery and since then she has had pain.     Review of Systems   Musculoskeletal: Positive for myalgias and neck pain.   Neurological: Positive for numbness.   Psychiatric/Behavioral: The patient is nervous/anxious.    All other systems reviewed and are negative.    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  Anxiety  Mediastinal cyst  Hyperlipidemia  Esophageal reflux   COPD  Depression  Asthma  Chronic pain  Diabetes  Emphysema with chronic bronchitis  Liver disease    Past Surgical History:    Appendectomy   Microflap removal of glottic lesion  Arthroscopy shoulder decompression  Biopsy artery temporal  Bronchial thermoplasty x3  Colonoscopy x2  Discectomy  Tonsillectomy x2  EGD  Thoracoscopy  Excise mass trunk and node mediastinal  Laparoscopic cholecystectomy    Family History:    Mother: heart disease, HTN, cerebrovascular disease, depression, gallbladder disease, asthma, disorder of lung  Sister: asthma, HTN, cerebrovascular  disease    Social History:  The patient presents to the ED alone.     Physical Exam     Patient Vitals for the past 24 hrs:   BP Temp Temp src Pulse Resp SpO2   05/26/22 1415 108/78 -- -- 77 -- 96 %   05/26/22 1400 116/59 -- -- 77 -- 98 %   05/26/22 1345 116/64 -- -- 76 -- 98 %   05/26/22 1330 107/71 -- -- 76 -- 96 %   05/26/22 1318 116/73 -- -- 79 -- 99 %   05/26/22 1218 (!) 163/116 97.9  F (36.6  C) Temporal 102 22 97 %       Physical Exam  General/Appearance: appears stated age, well-groomed, appears uncomfortable and is very tearful  Eyes: EOMI, no scleral injection, no icterus  ENT: MMM  Neck: supple, nl ROM, no stiffness but pain in back when flexing her neck, no posterior midline ttp  Cardiovascular: RRR, nl S1S2, no m/r/g, 2+ pulses in all 4 extremities, cap refill <2sec  Respiratory: CTAB, good air movement throughout, no wheezes/rhonchi/rales, no increased WOB, no retractions  Back: no lesions, no posterior reproducible ttp, reproducible ttp along L superior trapezius  GI: abd soft, non-distended, nttp,  no HSM, no rebound, no guarding, nl BS  MSK: JACKSON, good tone, no bony abnormality  Skin: warm and well-perfused, no rash, no edema, no ecchymosis, nl turgor  Neuro: GCS 15, alert and oriented, no gross focal neuro deficits- nl strength and sensation in BUE  Psych: tearful, anxious affect  Heme: no petechia, no purpura, no active bleeding    Emergency Department Course     Imaging:  Thoracic spine MRI w/o contrast    (Results Pending)   MR Cervical Spine w/o & w Contrast    (Results Pending)     Emergency Department Course:         Reviewed:  I reviewed nursing notes, vitals, past medical history and Care Everywhere    Assessments:  1236 I obtained history and examined the patient as noted above.     1425 I rechecked the patient and explained findings. Discussed plan for sign out to oncoming physician.     Interventions:  1309 Dilaudid 1mg IV  1406 Dilaudid 0.5mg IV    Disposition:  Care of the patient was  transferred to my colleague Dr. Su pending MRI scan results.     Impression & Plan     Medical Decision Making:  This patient is a 52-year-old female with history of cervical spine surgery back in 2017 who presents with pain in her neck and left shoulder.  She also now has pain that goes down her back when she tries to flex her neck.  There is no nuchal rigidity with side to side motion, no headache, no fever, nothing else to suggest meningitis.  Pain was reproducible with palpation along her left superior trapezius.  I suspect she has something like a pinched nerve or herniated disc that is causing this pain.  This is especially as she initially described initially her pain in her right upper extremity but now into her left shoulder.  I have thought about other etiologies such as aortic dissection however with the reproducibility on palpation and the fact that it hurts for her to flex her neck I think this is more likely musculoskeletal in nature.  Unfortunately the MRIs have been delayed and therefore she will be a signout to the oncoming physician, Dr. Su.  Plan for her is to get the MRIs.  If there is nothing acute that needs emergent surgical intervention she will be discharged home with gabapentin and pain meds.  Obviously if the MRI comes back showing something emergent Dr. Su will do with that at that time.  Patient feels comfortable with this plan.    Diagnosis:    ICD-10-CM    1. Cervicalgia  M54.2        Discharge Medications:  New Prescriptions    OXYCODONE-ACETAMINOPHEN (PERCOCET) 5-325 MG TABLET    Take 1 tablet by mouth every 6 hours as needed for pain    PREDNISONE (DELTASONE) 20 MG TABLET    Take 2 tablets (40 mg) by mouth daily for 5 days       Scribe Disclosure:  I, GALINDO ROWLAND, am serving as a scribe at 12:36 PM on 5/26/2022 to document services personally performed by Precious Bernard MD based on my observations and the provider's statements to me.     I, Mario Bartlett, am  serving as a scribe  at 2:39 PM on 5/26/2022 to document services personally performed by Precious Bernard MD based on my observations and the provider's statements to me.            Precious Bernard MD  05/26/22 1446       Precious Bernard MD  05/26/22 1449

## 2022-05-26 NOTE — TELEPHONE ENCOUNTER
Received a letter from [employer] stating Cherelle Barreto is the CC for this patient. Cherelle can be reached at [phone].

## 2022-05-26 NOTE — ED PROVIDER NOTES
Patient care signed out to me by my partner Dr. Bernard.  Please see her initial ED provider note regarding HPI, ROS, PE, MDM.  In brief patient is a 52-year-old female with a history of cervical spine surgery in 2017 who presents with neck and left shoulder pain.  Concern for degenerative disc disease and so an MRI of the cervical and thoracic spine are pending at this time.  Pain medications have been provided while in the emergency department.    MRI results showed mild degenerative changes and postoperative changes but thankfully no high-grade stenoses.  Patient is feeling moderately improved on recheck.  Per Dr. Bernard's recommendation she will be discharged with oral pain medication and close outpatient follow-up with her historic spine surgeon.  I have added additional Flexeril for muscle relaxant properties.  She may need further outpatient physical therapy and reassessment in the next 1 week.  After all questions answered and return precautions understood, discharged home.    MR Thoracic Spine w/o & w Contrast   Final Result   IMPRESSION:   1.  Mild degenerative change.   2.  No high-grade stenoses.      MR Cervical Spine w/o & w Contrast   Final Result   IMPRESSION:   1.  Postoperative change of C5-C6 anterior fusion.   2.  Mild degenerative change as detailed.             ICD-10-CM    1. Cervicalgia  M54.2    2. Cervical paraspinous muscle spasm  M62.838           Carson Su MD  05/26/22 1951

## 2022-05-27 ENCOUNTER — PATIENT OUTREACH (OUTPATIENT)
Dept: CARE COORDINATION | Facility: CLINIC | Age: 52
End: 2022-05-27
Payer: COMMERCIAL

## 2022-05-27 DIAGNOSIS — Z71.89 OTHER SPECIFIED COUNSELING: ICD-10-CM

## 2022-05-27 NOTE — PROGRESS NOTES
Clinic Care Coordination Contact  [facility]/Voicemail       Clinical Data: Care Coordinator Outreach  Outreach attempted x 1.  Left message on patient's voicemail with call back information and requested return call.  Plan: Care Coordinator will try to reach patient again in 1-2 business days.        NATALIA Cotto  [phone]  [facility]

## 2022-05-28 NOTE — PROGRESS NOTES
Clinic Care Coordination Contact  [facility]/Voicemail       Clinical Data: Care Coordinator Outreach  Outreach attempted x 2.  Left message on patient's voicemail with call back information and requested return call.  Plan: Care Coordinator will do no further outreaches at this time.          NATALIA Richardson  [phone]  [facility] Resource [facility]

## 2022-05-31 ENCOUNTER — TELEPHONE (OUTPATIENT)
Dept: INTERNAL MEDICINE | Facility: CLINIC | Age: 52
End: 2022-05-31
Payer: COMMERCIAL

## 2022-05-31 DIAGNOSIS — R11.0 NAUSEA: ICD-10-CM

## 2022-05-31 DIAGNOSIS — K21.9 GASTROESOPHAGEAL REFLUX DISEASE WITHOUT ESOPHAGITIS: ICD-10-CM

## 2022-05-31 NOTE — TELEPHONE ENCOUNTER
Ajit from [facility] calling for verbal orders for skilled nursing  1 visit a week for 8 weeks  Ok to call and nettie [phone]

## 2022-06-01 RX ORDER — ONDANSETRON 8 MG/1
TABLET, FILM COATED ORAL
Qty: 30 TABLET | Refills: 1 | Status: SHIPPED | OUTPATIENT
Start: 2022-06-01 | End: 2022-07-19

## 2022-06-02 ENCOUNTER — E-VISIT (OUTPATIENT)
Dept: INTERNAL MEDICINE | Facility: CLINIC | Age: 52
End: 2022-06-02
Payer: COMMERCIAL

## 2022-06-02 DIAGNOSIS — J01.90 ACUTE SINUSITIS WITH SYMPTOMS > 10 DAYS: Primary | ICD-10-CM

## 2022-06-02 DIAGNOSIS — B96.89 ACUTE BACTERIAL SINUSITIS: ICD-10-CM

## 2022-06-02 DIAGNOSIS — J01.90 ACUTE BACTERIAL SINUSITIS: ICD-10-CM

## 2022-06-02 PROCEDURE — 99421 OL DIG E/M SVC 5-10 MIN: CPT | Performed by: INTERNAL MEDICINE

## 2022-06-03 ENCOUNTER — TELEPHONE (OUTPATIENT)
Dept: INTERNAL MEDICINE | Facility: CLINIC | Age: 52
End: 2022-06-03
Payer: COMMERCIAL

## 2022-06-03 ENCOUNTER — MEDICAL CORRESPONDENCE (OUTPATIENT)
Dept: INTERNAL MEDICINE | Facility: CLINIC | Age: 52
End: 2022-06-03

## 2022-06-03 NOTE — PATIENT INSTRUCTIONS
Ramone Milian,    I am sorry you don't feel so well.     You mentioned in the questions that you might be pregnant. You need to check and clarify this asap because you are on a lot of meds.   For your sinuses I prescribed Augmentin.   Boil water and breath the warm vapors 2-3 times a day to try to open up the sinuses.     I hope you feel better soon.     Roro Low MD  Internal Medicine

## 2022-06-03 NOTE — TELEPHONE ENCOUNTER
Patient calls following E-Visit.     Patient clarifies that she is Not Pregnant. Patient states she was unable to complete E-Visit without answering pregnancy questions and she did not see the option to say No.     Patient picked up Augmentin prescription. Patient took a home Covid test and was negative. Patient is going to try Augmentin over the weekend and will call back on Monday if she does not feel better.

## 2022-06-09 ENCOUNTER — VIRTUAL VISIT (OUTPATIENT)
Dept: ENDOCRINOLOGY | Facility: CLINIC | Age: 52
End: 2022-06-09
Payer: COMMERCIAL

## 2022-06-09 ENCOUNTER — TELEPHONE (OUTPATIENT)
Dept: INTERNAL MEDICINE | Facility: CLINIC | Age: 52
End: 2022-06-09

## 2022-06-09 VITALS — WEIGHT: 221 LBS | BODY MASS INDEX: 39.16 KG/M2 | HEIGHT: 63 IN

## 2022-06-09 DIAGNOSIS — E66.01 MORBID OBESITY WITH BMI OF 40.0-44.9, ADULT (H): Primary | ICD-10-CM

## 2022-06-09 PROCEDURE — 99214 OFFICE O/P EST MOD 30 MIN: CPT | Mod: 95 | Performed by: SURGERY

## 2022-06-09 ASSESSMENT — PAIN SCALES - GENERAL: PAINLEVEL: SEVERE PAIN (6)

## 2022-06-09 NOTE — NURSING NOTE
"Chief Complaint   Patient presents with     RECHECK     Bariatric Meet and greet       Vitals:    06/09/22 1129   Weight: 100.2 kg (221 lb)   Height: 1.6 m (5' 3\")       Body mass index is 39.15 kg/m .          AL HOLLY EMT    "

## 2022-06-09 NOTE — TELEPHONE ENCOUNTER
6/3/22-8/1/22 HOME HEALTH CERTIFICATION; orders received via fax. Form in your mailbox to be signed.

## 2022-06-09 NOTE — PROGRESS NOTES
"Swetha is a 52 year old who is being evaluated via a billable video visit.      How would you like to obtain your AVS? MyChart  If the video visit is dropped, the invitation should be resent by: Text to cell phone: [phone]  Will anyone else be joining your video visit? No      Video Start Time: 12:13 PM  Video-Visit Details    Type of service:  Video Visit    Video End Time:12:13 PM    Originating Location (pt. Location): Home    Distant Location (provider location):  [facility] [place]     Platform used for Video Visit: Michael         Dear Dr. Chawla,      Referring provider:       1/19/2022   Who referred you? Hi doctor [facility] in [place]     I was asked to see the patient regarding obesity by the referring provider above.    I had the pleasure of meeting with your patient Swetha Patterson in our weight loss surgery office.  This patient is a 52 year old female who has been undergoing our thorough preoperative screening process in anticipation of potential bariatric surgery.    At initial evaluation we recorded Swetha Patterson's She has a height of 5' 3\", a weight of 247 lbs 0 oz, and calculated Body mass index is 43.75 kg/m . The patient has been unsuccessful with other methods of permanent weight loss and suffers from multiple weight related medical conditions.  Due to lack of success in achieving weight loss through other methods, she is interested in undergoing bariatric surgery.    Has hiatal hernia with esophageal tortuosity.    PREVIOUS WEIGHT LOSS ATTEMPTS:     1/19/2022   I have tried the following methods to lose weight Watching portions or calories, Exercise, Physician directed program       CO-MORBIDITIES OF OBESITY INCLUDE:     1/19/2022   I have the following health issues associated with obesity: Type II Diabetes, High Blood Pressure, High Cholesterol, Sleep Apnea, GERD (Reflux), Fatty Liver, Asthma       VITALS:  Ht 1.6 m (5' 3\")   Wt " 100.2 kg (221 lb)   BMI 39.15 kg/m      PE:  GENERAL: Alert and oriented x3. NAD    RESPIRATORY: Breathing unlabored    Rest of exam deferred due to virtual visit    Incisions of lap jocelyn; appy    In summary, she has undergone an appropriate medical evaluation, dietitian evaluation, as well as psychologic screening. The patient appears to be an appropriate candidate for bariatric surgery.    In the office today, I discussed the laparoscopic gastric sleeve surgery.  Risks, benefits and anticipated outcomes were outlined including the risk of death, staple line leak (1-2%), PE, DVT, ulcer, worsening GERD, N/V, stricture, hernia, wound infection, weight regain, and vitamin deficiencies. This patient has a good chance of sustaining permanent weight loss due to this procedure.  This should also allow improvement if not resolution of his/her weight related medical conditions.    At present we are going to present your patient's file for prior authorization to insurance.  Pending prior authorization, I anticipate a surgical date in the near future.  We will keep you updated on any progress.  If you have questions regarding care please feel free to contact me.  Total time spent in the clinic was 30 minutes with greater than 50% in face-to-face consultation.        Sincerely,    Rogers Leach MD    Please route or send letter to:  Primary Care Provider (PCP) and Referring Provider      This is endoscopy was from 2017.          Dr. Damon's report from 2021 as follows:

## 2022-06-09 NOTE — LETTER
"6/9/2022       RE: Swetha Patterson  [id number]  [place] [facility] [address]     Dear Colleague,    Thank you for referring your patient, Swetha Patterson, to the [facility] [place] [facility]. Please see a copy of my visit note below.    Swetha is a 52 year old who is being evaluated via a billable video visit.      How would you like to obtain your AVS? MyChart  If the video visit is dropped, the invitation should be resent by: Text to cell phone: [phone]  Will anyone else be joining your video visit? No      Video Start Time: 12:13 PM  Video-Visit Details    Type of service:  Video Visit    Video End Time:12:13 PM    Originating Location (pt. Location): Home    Distant Location (provider location):  [facility]     Platform used for Video Visit: Michael         Dear Dr. Chawla,      Referring provider:       1/19/2022   Who referred you? Hi doctor [facility] clinic in [place]     I was asked to see the patient regarding obesity by the referring provider above.    I had the pleasure of meeting with your patient Swetha Patterson in our weight loss surgery office.  This patient is a 52 year old female who has been undergoing our thorough preoperative screening process in anticipation of potential bariatric surgery.    At initial evaluation we recorded Swetha Patterson's She has a height of 5' 3\", a weight of 247 lbs 0 oz, and calculated Body mass index is 43.75 kg/m . The patient has been unsuccessful with other methods of permanent weight loss and suffers from multiple weight related medical conditions.  Due to lack of success in achieving weight loss through other methods, she is interested in undergoing bariatric surgery.    Has hiatal hernia with esophageal tortuosity.    PREVIOUS WEIGHT LOSS ATTEMPTS:     1/19/2022   I have tried the following methods to lose weight " "Watching portions or calories, Exercise, Physician directed program       CO-MORBIDITIES OF OBESITY INCLUDE:     1/19/2022   I have the following health issues associated with obesity: Type II Diabetes, High Blood Pressure, High Cholesterol, Sleep Apnea, GERD (Reflux), Fatty Liver, Asthma       VITALS:  Ht 1.6 m (5' 3\")   Wt 100.2 kg (221 lb)   BMI 39.15 kg/m      PE:  GENERAL: Alert and oriented x3. NAD    RESPIRATORY: Breathing unlabored    Rest of exam deferred due to virtual visit    Incisions of lap jocelyn; appy    In summary, she has undergone an appropriate medical evaluation, dietitian evaluation, as well as psychologic screening. The patient appears to be an appropriate candidate for bariatric surgery.    In the office today, I discussed the laparoscopic gastric sleeve surgery.  Risks, benefits and anticipated outcomes were outlined including the risk of death, staple line leak (1-2%), PE, DVT, ulcer, worsening GERD, N/V, stricture, hernia, wound infection, weight regain, and vitamin deficiencies. This patient has a good chance of sustaining permanent weight loss due to this procedure.  This should also allow improvement if not resolution of his/her weight related medical conditions.    At present we are going to present your patient's file for prior authorization to insurance.  Pending prior authorization, I anticipate a surgical date in the near future.  We will keep you updated on any progress.  If you have questions regarding care please feel free to contact me.  Total time spent in the clinic was 30 minutes with greater than 50% in face-to-face consultation.        Sincerely,    Rogers Leach MD    Please route or send letter to:  Primary Care Provider (PCP) and Referring Provider      This is endoscopy was from 2017.          Dr. Damon's report from 2021 as follows:              "

## 2022-06-10 DIAGNOSIS — Z53.9 DIAGNOSIS NOT YET DEFINED: Primary | ICD-10-CM

## 2022-06-10 PROCEDURE — G0179 MD RECERTIFICATION HHA PT: HCPCS | Performed by: INTERNAL MEDICINE

## 2022-06-12 ENCOUNTER — HEALTH MAINTENANCE LETTER (OUTPATIENT)
Age: 52
End: 2022-06-12

## 2022-06-13 ENCOUNTER — TELEPHONE (OUTPATIENT)
Dept: ENDOCRINOLOGY | Facility: CLINIC | Age: 52
End: 2022-06-13
Payer: COMMERCIAL

## 2022-06-13 NOTE — TELEPHONE ENCOUNTER
Patient called to let me know she has a pre-bariatric surgery psychological evaluation scheduled with Dr. Henok Hartman on July 6.   Has been ill so had to reschedule her sleep study to 8/11/22.   Patient states she will contact her primary care provider this week to get letter of support.

## 2022-06-13 NOTE — PROGRESS NOTES
"Swetha Patterson is a 52 year old female who is being evaluated via a billable video visit.      The patient has been notified of following:     \"This video visit will be conducted via a call between you and your physician/provider. We have found that certain health care needs can be provided without the need for an in-person physical exam.  This service lets us provide the care you need with a video conversation.  If a prescription is necessary we can send it directly to your pharmacy.  If lab work is needed we can place an order for that and you can then stop by our lab to have the test done at a later time.    Video visits are billed at different rates depending on your insurance coverage.  Please reach out to your insurance provider with any questions.    If during the course of the call the physician/provider feels a video visit is not appropriate, you will not be charged for this service.\"    How would you like to obtain your AVS? MyChart  If the video visit is dropped, the invitation should be resent by: Text to cell phone: [phone]  Will anyone else be joining your video visit? No      Video-Visit Details    Type of service:  Video Visit    Video Start Time: 1:20 pm  Video End Time: 2:00 pm    Originating Location (pt. Location): Home    Distant Location (provider location):  [facility] [place]     Platform used for Video Visit: Claritics    During this virtual visit the patient is located in [place], patient verifies this as the location during the entirety of this visit.     Bariatric Nutrition Consultation Note    Reason For Visit: Nutrition Assessment    Swetha Patterson is a 51 year old presenting today for return bariatric nutrition consult.   Pt is interested in laparoscopic sleeve gastrectomy.    This is pt's 4th of 3 required nutrition visits prior to surgery.   Pt needs formal post op education once she has a surgery date.     Pt referred by NOE Blake on " "January 20, 2022.    Coordination note:   Needs Psych eval  - Scheduled for July 6th with Dr. Hartman  Needs letter of support from therapist and psychiatrist   Needs clearance from sleep medicine - In progress, had to reschedule sleep study to 7/1/22 due to being sick  Needs PCP letter of support - Done per pt.   Surgeon meet and greet with Dr. Leach - Done 6/9/22   No nicotine for 3 months prior to surgery - Working on  Sobriety from alcohol for 1 year - May 2022     Needs baseline labs - Done 1/24/22  10 lb weight loss from initial - Met    Patient with Co-morbidities of obesity including:  Type II DM no but does have pre-diabetes, last A1C 5.8 on 1/24/22  Renal Failure no  Sleep apnea yes  Hypertension yes  Dyslipidemia yes  Joint pain no  Back pain no  GERD no     PMH: fatty liver (nonalcoholic per pt), fibromyalgia, cholecystomy, asthma    Support System Reviewed With Patient 1/19/2022   Who do you have in your support network that can be available to help you for the first 2 weeks after surgery? I have my sister Rukhsana a very close friend of keri RosalbaFletcher and I have my dad and I also have psychiatrist my sister Rukhsana Kearns my dad and my psychiatrist again Rukhsana Milian   Who can you count on for support throughout your weight loss surgery journey? Rukhsana Kearns my dad my psychiatrist       ANTHROPOMETRICS:  Consult weight 1/20/22: 247 lbs with BMI 43.75    Estimated body mass index is 39.15 kg/m  as calculated from the following:    Height as of 6/9/22: 1.6 m (5' 3\").    Weight as of 6/9/22: 100.2 kg (221 lb).     Current weight: ~216 lbs, pt report    Required weight loss goal pre-op: 10 lbs from initial consult weight (goal weight 237 lbs or less before surgery)       1/19/2022   I have tried the following methods to lose weight Watching portions or calories, Exercise, Physician directed program       Weight Loss Questions Reviewed With Patient 1/19/2022   How long have you been " overweight? From Middle age and beyond     MEDICATIONS FOR WEIGHT LOSS:  Semaglutide  Naltrexone      Also takes Wellbutrin    SUPPLEMENT INFORMATION:  Vit D 5000 IUS/ MWF  One daily Womens MVI - not chewable    Vit D WNL 1/24/22, hx of vit d def      NUTRITION HISTORY:  NKFA    Pt s friend had sleeve surgery - is familiar with some of it through her. Went well for friend.    Nicotine: Smoked for ~40+ years per pt. Quit cigarettes two weeks ago, using patch and gum. Refer to MTM. None for 3 months prior to surgery    Alcohol: hx of abuse. Sober since May 2021. No surgery until May 2022    Please see previous RD note for more detail.    March 2022:    Discussed task list for surgery.     Doing well with dietary goals  ? Chewing food well (counting to 15-20)  ? Smaller portions   ? Continues to be alcohol free  ? Continues to drink slower   ?  fluids from meal time for most part (might have a sip here/there) - at about 20 minutes with none right now    Protein sources: pb, yogurt, cheese, chicken and turkey     Recent recall:  - Banana  - Mixed fruit  - Cheese   - 2 Tbsp pb on celery   - Vegetable Soup   - 1 slice of pepperoni pizza     PA: walking lots    April 2022:    Pt shared she has not had a BM since Friday. Thinks it is related to the Rybelsus (Semglatuide) she recently started. She is hoping to stop the medication. Scared to eat right now due to being backed up.     Didn t eat anything yesterday - today only had two pickles thus far.      Continues to do well with dietary goals and weight loss. However, pt is struggling with changes in her body and  saggy skin . Pt concerned about how she will look going forward. She is not sure how to proceed right now - hesitant but also not wanting to give up.    Has found that if she overeats she will vomit - stomach is helping her know when to stop.     May 2022:  Was struggling with night eating - better now. Increased dose of medication and none  since.    Continues to do well with goals -  fluids during meal time, chewing well, setting utensil down, making good food choices.    Has practiced eating on smaller plates (saucer size)    Doing well resisting temptations.     Fluids: water, 1 coke zero/day, V8 juice occ, sugar-free flavorings in water    BM - was constipated but made some changes (more fiber, etc)     Nicotine: none - was using patches but stopped     PA: walking or moving around house    June 2022:    Really sick - bed for 3 days. Throws up anytime she tries to get up. Can t hold any food down - trying to stay hydrated. Lost 5 lbs. At home nurse came yesterday - said temperature was only 85 degrees. Nurse checked 3x - both temples and wrists. Then went up to 95. Temp today is 99.1. Checking temp about every 4 hrs. Checked for covid - negative.     Ate some toast today and munching on crackers. Plans to try ramen noodles next. Also doing popsicles. Also doing some electrolyte water. Recommended she try some Ensure.     ? Cut out all carbonated beverages.  ? Continues with smaller portions.   ? Bought some protein shakes for after surgery (Ensure Max protein).   ? No alcohol.  ? No nicotine  ?  fluids from meal and none for up to 20 min after (working towards 30 min)    Normal breakfast before getting sick was egg whites. Not keeping sweets in house. Eating well per pt - will grab a yogurt if craving something sweet.     Weight was up to 230 lbs, noticed an increase in cravings. Started Naltrexone. Now down to 216 (5 lb loss since being sick).     Lab appt scheduled for 7/18. Stopped using nicotine patches right after seeing Rashmi 4/27/22    Recently got an exercise bike from a friend.      Recall Diet Questions Reviewed With Patient 1/19/2022   Describe what you typically consume for breakfast (typical or most recent): I usually don't eat much for breakfast maybe a piece of toast.   Describe what you typically consume for  lunch (typical or most recent): Sometimes hot dogs or macaroni and cheese or hamburgers or fast food or eggs or just about anything   Describe what you typically consume for supper (typical or most recent): Always have to have meat with my dinner potato vegetable if it's corn or green beans but I prefer peas and then I snack at night time before bed because I'm so hungry   Describe what you typically consume as snacks (typical or most recent): Cereal bars chips sometimes fruit a hamburger fast food   How many ounces of water, or other low calorie drinks, do you drink daily (8 oz=1 glass)? 48 oz   How many ounces of caffeine (coffee, tea, pop) do you drink daily (8 oz=1 glass)? 8 oz   How many ounces of carbonated (pop, beer, sparkling water) drinks do you drinky daily (8 oz=1 glass)? 8 oz   How many ounces of juice, pop, sweet tea, sports drinks, protein drinks, other sweetened drinks, do you drink daily (8 oz=1 glass)? 24 oz   How many ounces of milk do you drink daily (8 oz=1 glass) 8 oz   Please indicate the type of milk: 2%   How often do you drink alcohol? Never   If you do drink alcohol, how many drinks might you have in a day? (one drink = 5 oz. wine, 1 can/bottle of beer, 1 shot liquor) -       Eating Habits 1/19/2022   Do you have any dietary restrictions? Yes   Do you currently binge eat (eat a large amount of food in a short time)? -   Are you an emotional eater? No   Do you get up to eat after falling asleep? Yes   What foods do you crave? I crave Pizza macaroni and cheese [employer] hamburger french fries but I do still eat my vegetables     Dining Out History Reviewed With Patient 1/19/2022   How often do you dine out? A couple of times a week.   Where do you dine out? (select all that apply) fast food chains, take out   What types of food do you order when you dine out? Some type of meat potato and of course you got to have a green beans or corn       Physical Activity Reviewed With Patient 2/22/2022    How often do you exercise? Daily   What is the duration of your exercise (in minutes)? 30 Minutes   What types of exercise do you do? walking, other   What keeps you from being more active?  I am as active as I can possbily be       NUTRITION DIAGNOSIS:  Obesity r/t long history of positive energy balance aeb BMI >30 kg/m2.    INTERVENTION:  Intervention Provided/Education Provided on post-op diet guidelines, vitamins/minerals essential post-operatively, GI anatomy of bariatric surgeries, ways to help prepare for post-op diet guidelines pre-operatively, portion/calorie-control, mindful eating and sources of protein.  Patient demonstrates understanding. Provided pt with list of goals RD contact information.      Questions Reviewed With Patient 1/19/2022   How ready are you to make changes regarding your weight? Number 1 = Not ready at all to make changes up to 10 = very ready. 10   How confident are you that you can change? 1 = Not confident that you will be successful making changes up to 10 = very confident. 10       Expected Engagement: good    GOALS:  1. Keep up the great work!    - No alcohol   - No smoking   - Seperating fluids from meal time    - Sipping fluids   - Chewing/pace   - No carbonation after surgery    High Fiber Foods:  Bran cereal, 1/3 cup, 8.6 gm fiber   Cooked kidney beans   cup 7.9 gm  Cooked lentils   cup 7.8 gm  Cooked black beans   cup 7.6 gm  Canned chickpeas   cup 5.3 gm  Baked beans   cup 5.2 gm  Pear 1 5.1 gm  Soybeans   cup 5.1 gm  Quinoa   cup 5 gm  Baked sweet potato, with skin 1 medium 4.8 gm  Baked potato, with skin 1 medium 4.4 gm  Cooked frozen green peas   cup 4.4 gm  Bulgur   cup 4.1 gm  Cooked frozen mixed vegetables   cup 4 gm  Raspberries   cup 4 gm  Blackberries   cup 3.8 gm  Almonds 1 oz 3.5 gm  Cooked frozen spinach   cup 3.5 gm  Vegetable or soy yony 1 each 3.4 gm  Apple 1 medium 3.3 gm  Dried dates 5 pieces 3.3 gm    Surgery resources:  Diet Guidelines after Weight  Loss Surgery  [email or web address]     Post-op Diet Advancement Schedule:  Clear Liquid Diet (stage 1):  TBD   *No RED, PROTEIN or PULP day before surgery  Low-Fat Full Liquid Diet (stage 2): TBD (2 weeks total between clear/liquid)  Pureed Diet (stage 3): TBD (2 weeks)  Soft Diet (stage 4): TBD (4 weeks)  Regular Diet (stage 5): TBD     Take the following after a Sleeve Gastrectomy:  - Multivitamin/minerals: adult dose 1-2 times daily  *Will need chewable for 3 months  - Iron: 18+ mg/day - may partly or fully be covered in multivitamin   - Calcium Citrate containing vitamin D: 500 mg 3 times daily or 600 mg 2 times daily              - Separate the calcium from your multivitamin or iron by at least 2 hours.               - Must be a chewable calcium citrate until post-op 3 months               - Options for calcium citrate: Beni calcium citrate chewable, bariatric advantage calcium citrate chewable, Celebrate vitamins calcium citrate chewable, Bariatric Fusion calcium citrate chewable  - Vitamin D - at least 3,000 international units/day between all supplements  - Vitamin B12: sublingual form of at least 500 mcg daily or injection of 1000 mcg monthly     Diabetes Plate Method:  https://www.diabetesfoodhub.org/articles/what-is-the-diabetes-plate-method.html    Your Stage 1 Diet: Clear Liquids  [email or web address]     Your Stage 2 Diet: Low-fat Full Liquids  [email or web address]     Your Stage 3 Diet: Pureed Foods  [email or web address]     Pureed Recipes  [email or web address]    Your Stage 4 Diet: Soft Foods  [email or web address]    Your Stage 5 Diet: Regular Foods  [email or web address]      Follow up:   Tuesday, July 13th at 1:00 pm    Time spent with patient: 40 minutes.  SHELLY Neal, RD, LD

## 2022-06-14 ENCOUNTER — TELEPHONE (OUTPATIENT)
Dept: ENDOCRINOLOGY | Facility: CLINIC | Age: 52
End: 2022-06-14

## 2022-06-14 ENCOUNTER — VIRTUAL VISIT (OUTPATIENT)
Dept: ENDOCRINOLOGY | Facility: CLINIC | Age: 52
End: 2022-06-14
Payer: COMMERCIAL

## 2022-06-14 ENCOUNTER — CARE COORDINATION (OUTPATIENT)
Dept: ENDOCRINOLOGY | Facility: CLINIC | Age: 52
End: 2022-06-14

## 2022-06-14 ENCOUNTER — MEDICAL CORRESPONDENCE (OUTPATIENT)
Dept: HEALTH INFORMATION MANAGEMENT | Facility: CLINIC | Age: 52
End: 2022-06-14

## 2022-06-14 VITALS — WEIGHT: 216 LBS | BODY MASS INDEX: 38.26 KG/M2

## 2022-06-14 DIAGNOSIS — Z71.3 NUTRITIONAL COUNSELING: Primary | ICD-10-CM

## 2022-06-14 PROCEDURE — 97803 MED NUTRITION INDIV SUBSEQ: CPT | Mod: GT | Performed by: DIETITIAN, REGISTERED

## 2022-06-14 NOTE — PATIENT INSTRUCTIONS
GOALS:  1. Keep up the great work!    - No alcohol   - No smoking   - Seperating fluids from meal time    - Sipping fluids   - Chewing/pace   - No carbonation after surgery    High Fiber Foods:  Bran cereal, 1/3 cup, 8.6 gm fiber   Cooked kidney beans   cup 7.9 gm  Cooked lentils   cup 7.8 gm  Cooked black beans   cup 7.6 gm  Canned chickpeas   cup 5.3 gm  Baked beans   cup 5.2 gm  Pear 1 5.1 gm  Soybeans   cup 5.1 gm  Quinoa   cup 5 gm  Baked sweet potato, with skin 1 medium 4.8 gm  Baked potato, with skin 1 medium 4.4 gm  Cooked frozen green peas   cup 4.4 gm  Bulgur   cup 4.1 gm  Cooked frozen mixed vegetables   cup 4 gm  Raspberries   cup 4 gm  Blackberries   cup 3.8 gm  Almonds 1 oz 3.5 gm  Cooked frozen spinach   cup 3.5 gm  Vegetable or soy yony 1 each 3.4 gm  Apple 1 medium 3.3 gm  Dried dates 5 pieces 3.3 gm      Surgery resources:  Diet Guidelines after Weight Loss Surgery  [email or web address]     Post-op Diet Advancement Schedule:  Clear Liquid Diet (stage 1):  TBD   *No RED, PROTEIN or PULP day before surgery  Low-Fat Full Liquid Diet (stage 2): TBD (2 weeks total between clear/liquid)  Pureed Diet (stage 3): TBD (2 weeks)  Soft Diet (stage 4): TBD (4 weeks)  Regular Diet (stage 5): TBD     Take the following after a Sleeve Gastrectomy:  - Multivitamin/minerals: adult dose 1-2 times daily  *Will need chewable for 3 months  - Iron: 18+ mg/day - may partly or fully be covered in multivitamin   - Calcium Citrate containing vitamin D: 500 mg 3 times daily or 600 mg 2 times daily              - Separate the calcium from your multivitamin or iron by at least 2 hours.               - Must be a chewable calcium citrate until post-op 3 months               - Options for calcium citrate: Beni calcium citrate chewable, bariatric advantage calcium citrate chewable, Celebrate vitamins calcium citrate chewable, Bariatric Fusion calcium citrate chewable  - Vitamin D - at least 3,000 international units/day  between all supplements  - Vitamin B12: sublingual form of at least 500 mcg daily or injection of 1000 mcg monthly     Diabetes Plate Method:  https://www.diabetesfoodhub.org/articles/what-is-the-diabetes-plate-method.html    Your Stage 1 Diet: Clear Liquids  [email or web address]     Your Stage 2 Diet: Low-fat Full Liquids  [email or web address]     Your Stage 3 Diet: Pureed Foods  [email or web address]     Pureed Recipes  [email or web address]    Your Stage 4 Diet: Soft Foods  [email or web address]    Your Stage 5 Diet: Regular Foods  [email or web address]      Follow up:   Tuesday, July 13th at 1:00 pm    SHELLY Henning, RD, LD  Clinic #: [phone]

## 2022-06-14 NOTE — LETTER
"6/14/2022       RE: Swetha Patterson  [address]  [facility] [address]     Dear Colleague,    Thank you for referring your patient, Swetha Patterson, to the [facility] [place] at [facility]. Please see a copy of my visit note below.    Swetha Patterson is a 52 year old female who is being evaluated via a billable video visit.      The patient has been notified of following:     \"This video visit will be conducted via a call between you and your physician/provider. We have found that certain health care needs can be provided without the need for an in-person physical exam.  This service lets us provide the care you need with a video conversation.  If a prescription is necessary we can send it directly to your pharmacy.  If lab work is needed we can place an order for that and you can then stop by our lab to have the test done at a later time.    Video visits are billed at different rates depending on your insurance coverage.  Please reach out to your insurance provider with any questions.    If during the course of the call the physician/provider feels a video visit is not appropriate, you will not be charged for this service.\"    How would you like to obtain your AVS? MyChart  If the video visit is dropped, the invitation should be resent by: Text to cell phone: [phone]  Will anyone else be joining your video visit? No      Video-Visit Details    Type of service:  Video Visit    Video Start Time: 1:20 pm  Video End Time: 2:00 pm    Originating Location (pt. Location): Home    Distant Location (provider location):  [facility] [place]     Platform used for Video Visit: My Healthy World    During this virtual visit the patient is located in [place], patient verifies this as the location during the entirety of this visit.     Bariatric Nutrition Consultation Note    Reason For Visit: Nutrition " "Assessment    Swetha Patterson is a 51 year old presenting today for return bariatric nutrition consult.   Pt is interested in laparoscopic sleeve gastrectomy.    This is pt's 4th of 3 required nutrition visits prior to surgery.   Pt needs formal post op education once she has a surgery date.     Pt referred by NOE Blake on January 20, 2022.    Coordination note:   Needs Psych eval  - Scheduled for July 6th with Dr. Hartman  Needs letter of support from therapist and psychiatrist   Needs clearance from sleep medicine - In progress, had to reschedule sleep study to 7/1/22 due to being sick  Needs PCP letter of support - Done per pt.   Surgeon meet and greet with Dr. Leach - Done 6/9/22   No nicotine for 3 months prior to surgery - Working on  Sobriety from alcohol for 1 year - May 2022     Needs baseline labs - Done 1/24/22  10 lb weight loss from initial - Met    Patient with Co-morbidities of obesity including:  Type II DM no but does have pre-diabetes, last A1C 5.8 on 1/24/22  Renal Failure no  Sleep apnea yes  Hypertension yes  Dyslipidemia yes  Joint pain no  Back pain no  GERD no     PMH: fatty liver (nonalcoholic per pt), fibromyalgia, cholecystomy, asthma    Support System Reviewed With Patient 1/19/2022   Who do you have in your support network that can be available to help you for the first 2 weeks after surgery? I have my sister Rukhsana a very close friend of keri Mckinley and I have my dad and I also have psychiatrist my sister Rukhsana Kearns my dad and my psychiatrist again Rukhsana Milian   Who can you count on for support throughout your weight loss surgery journey? Rukhsana Kearns my dad my psychiatrist       ANTHROPOMETRICS:  Consult weight 1/20/22: 247 lbs with BMI 43.75    Estimated body mass index is 39.15 kg/m  as calculated from the following:    Height as of 6/9/22: 1.6 m (5' 3\").    Weight as of 6/9/22: 100.2 kg (221 lb).     Current weight: ~216 lbs, pt report    Required " weight loss goal pre-op: 10 lbs from initial consult weight (goal weight 237 lbs or less before surgery)       1/19/2022   I have tried the following methods to lose weight Watching portions or calories, Exercise, Physician directed program       Weight Loss Questions Reviewed With Patient 1/19/2022   How long have you been overweight? From Middle age and beyond     MEDICATIONS FOR WEIGHT LOSS:  Semaglutide  Naltrexone      Also takes Wellbutrin    SUPPLEMENT INFORMATION:  Vit D 5000 IUS/ MWF  One daily Womens MVI - not chewable    Vit D WNL 1/24/22, hx of vit d def      NUTRITION HISTORY:  NKFA    Pt s friend had sleeve surgery - is familiar with some of it through her. Went well for friend.    Nicotine: Smoked for ~40+ years per pt. Quit cigarettes two weeks ago, using patch and gum. Refer to MTM. None for 3 months prior to surgery    Alcohol: hx of abuse. Sober since May 2021. No surgery until May 2022    Please see previous RD note for more detail.    March 2022:    Discussed task list for surgery.     Doing well with dietary goals  ? Chewing food well (counting to 15-20)  ? Smaller portions   ? Continues to be alcohol free  ? Continues to drink slower   ?  fluids from meal time for most part (might have a sip here/there) - at about 20 minutes with none right now    Protein sources: pb, yogurt, cheese, chicken and turkey     Recent recall:  - Banana  - Mixed fruit  - Cheese   - 2 Tbsp pb on celery   - Vegetable Soup   - 1 slice of pepperoni pizza     PA: walking lots    April 2022:    Pt shared she has not had a BM since Friday. Thinks it is related to the Rybelsus (Semglatuide) she recently started. She is hoping to stop the medication. Scared to eat right now due to being backed up.     Didn t eat anything yesterday - today only had two pickles thus far.      Continues to do well with dietary goals and weight loss. However, pt is struggling with changes in her body and  saggy skin . Pt concerned  about how she will look going forward. She is not sure how to proceed right now - hesitant but also not wanting to give up.    Has found that if she overeats she will vomit - stomach is helping her know when to stop.     May 2022:  Was struggling with night eating - better now. Increased dose of medication and none since.    Continues to do well with goals -  fluids during meal time, chewing well, setting utensil down, making good food choices.    Has practiced eating on smaller plates (saucer size)    Doing well resisting temptations.     Fluids: water, 1 coke zero/day, V8 juice occ, sugar-free flavorings in water    BM - was constipated but made some changes (more fiber, etc)     Nicotine: none - was using patches but stopped     PA: walking or moving around house    June 2022:    Really sick - bed for 3 days. Throws up anytime she tries to get up. Can t hold any food down - trying to stay hydrated. Lost 5 lbs. At home nurse came yesterday - said temperature was only 85 degrees. Nurse checked 3x - both temples and wrists. Then went up to 95. Temp today is 99.1. Checking temp about every 4 hrs. Checked for covid - negative.     Ate some toast today and munching on crackers. Plans to try ramen noodles next. Also doing popsicles. Also doing some electrolyte water. Recommended she try some Ensure.     ? Cut out all carbonated beverages.  ? Continues with smaller portions.   ? Bought some protein shakes for after surgery (Ensure Max protein).   ? No alcohol.  ? No nicotine  ?  fluids from meal and none for up to 20 min after (working towards 30 min)    Normal breakfast before getting sick was egg whites. Not keeping sweets in house. Eating well per pt - will grab a yogurt if craving something sweet.     Weight was up to 230 lbs, noticed an increase in cravings. Started Naltrexone. Now down to 216 (5 lb loss since being sick).     Lab appt scheduled for 7/18. Stopped using nicotine patches right  after seeing Rashmi 4/27/22    Recently got an exercise bike from a friend.      Recall Diet Questions Reviewed With Patient 1/19/2022   Describe what you typically consume for breakfast (typical or most recent): I usually don't eat much for breakfast maybe a piece of toast.   Describe what you typically consume for lunch (typical or most recent): Sometimes hot dogs or macaroni and cheese or hamburgers or fast food or eggs or just about anything   Describe what you typically consume for supper (typical or most recent): Always have to have meat with my dinner potato vegetable if it's corn or green beans but I prefer peas and then I snack at night time before bed because I'm so hungry   Describe what you typically consume as snacks (typical or most recent): Cereal bars chips sometimes fruit a hamburger fast food   How many ounces of water, or other low calorie drinks, do you drink daily (8 oz=1 glass)? 48 oz   How many ounces of caffeine (coffee, tea, pop) do you drink daily (8 oz=1 glass)? 8 oz   How many ounces of carbonated (pop, beer, sparkling water) drinks do you drinky daily (8 oz=1 glass)? 8 oz   How many ounces of juice, pop, sweet tea, sports drinks, protein drinks, other sweetened drinks, do you drink daily (8 oz=1 glass)? 24 oz   How many ounces of milk do you drink daily (8 oz=1 glass) 8 oz   Please indicate the type of milk: 2%   How often do you drink alcohol? Never   If you do drink alcohol, how many drinks might you have in a day? (one drink = 5 oz. wine, 1 can/bottle of beer, 1 shot liquor) -       Eating Habits 1/19/2022   Do you have any dietary restrictions? Yes   Do you currently binge eat (eat a large amount of food in a short time)? -   Are you an emotional eater? No   Do you get up to eat after falling asleep? Yes   What foods do you crave? I crave Pizza macaroni and cheese [employer] hamburger french fries but I do still eat my vegetables     Dining Out History Reviewed With Patient 1/19/2022    How often do you dine out? A couple of times a week.   Where do you dine out? (select all that apply) fast food chains, take out   What types of food do you order when you dine out? Some type of meat potato and of course you got to have a green beans or corn       Physical Activity Reviewed With Patient 2/22/2022   How often do you exercise? Daily   What is the duration of your exercise (in minutes)? 30 Minutes   What types of exercise do you do? walking, other   What keeps you from being more active?  I am as active as I can possbily be       NUTRITION DIAGNOSIS:  Obesity r/t long history of positive energy balance aeb BMI >30 kg/m2.    INTERVENTION:  Intervention Provided/Education Provided on post-op diet guidelines, vitamins/minerals essential post-operatively, GI anatomy of bariatric surgeries, ways to help prepare for post-op diet guidelines pre-operatively, portion/calorie-control, mindful eating and sources of protein.  Patient demonstrates understanding. Provided pt with list of goals RD contact information.      Questions Reviewed With Patient 1/19/2022   How ready are you to make changes regarding your weight? Number 1 = Not ready at all to make changes up to 10 = very ready. 10   How confident are you that you can change? 1 = Not confident that you will be successful making changes up to 10 = very confident. 10       Expected Engagement: good    GOALS:  1. Keep up the great work!    - No alcohol   - No smoking   - Seperating fluids from meal time    - Sipping fluids   - Chewing/pace   - No carbonation after surgery    High Fiber Foods:  Bran cereal, 1/3 cup, 8.6 gm fiber   Cooked kidney beans   cup 7.9 gm  Cooked lentils   cup 7.8 gm  Cooked black beans   cup 7.6 gm  Canned chickpeas   cup 5.3 gm  Baked beans   cup 5.2 gm  Pear 1 5.1 gm  Soybeans   cup 5.1 gm  Quinoa   cup 5 gm  Baked sweet potato, with skin 1 medium 4.8 gm  Baked potato, with skin 1 medium 4.4 gm  Cooked frozen green peas   cup 4.4  gm  Bulgur   cup 4.1 gm  Cooked frozen mixed vegetables   cup 4 gm  Raspberries   cup 4 gm  Blackberries   cup 3.8 gm  Almonds 1 oz 3.5 gm  Cooked frozen spinach   cup 3.5 gm  Vegetable or soy yony 1 each 3.4 gm  Apple 1 medium 3.3 gm  Dried dates 5 pieces 3.3 gm    Surgery resources:  Diet Guidelines after Weight Loss Surgery  [email or web address]     Post-op Diet Advancement Schedule:  Clear Liquid Diet (stage 1):  TBD   *No RED, PROTEIN or PULP day before surgery  Low-Fat Full Liquid Diet (stage 2): TBD (2 weeks total between clear/liquid)  Pureed Diet (stage 3): TBD (2 weeks)  Soft Diet (stage 4): TBD (4 weeks)  Regular Diet (stage 5): TBD     Take the following after a Sleeve Gastrectomy:  - Multivitamin/minerals: adult dose 1-2 times daily  *Will need chewable for 3 months  - Iron: 18+ mg/day - may partly or fully be covered in multivitamin   - Calcium Citrate containing vitamin D: 500 mg 3 times daily or 600 mg 2 times daily              - Separate the calcium from your multivitamin or iron by at least 2 hours.               - Must be a chewable calcium citrate until post-op 3 months               - Options for calcium citrate: Beni calcium citrate chewable, bariatric advantage calcium citrate chewable, Celebrate vitamins calcium citrate chewable, Bariatric Fusion calcium citrate chewable  - Vitamin D - at least 3,000 international units/day between all supplements  - Vitamin B12: sublingual form of at least 500 mcg daily or injection of 1000 mcg monthly     Diabetes Plate Method:  https://www.diabetesfoodhub.org/articles/what-is-the-diabetes-plate-method.html    Your Stage 1 Diet: Clear Liquids  [email or web address]     Your Stage 2 Diet: Low-fat Full Liquids  [email or web address]     Your Stage 3 Diet: Pureed Foods  [email or web address]     Pureed Recipes  [email or web address]    Your Stage 4 Diet: Soft Foods  [email or web address]    Your Stage 5 Diet:  Regular Foods  [email or web address]      Follow up:   Tuesday, July 13th at 1:00 pm    Time spent with patient: 40 minutes.  SHELLY Neal, RD, LD

## 2022-06-14 NOTE — TELEPHONE ENCOUNTER
"Received a letter from Ilir Mason [facility] with [facility] dated 6/14/2022 who manages Rukhsana's psychiatric medications. \"I do support her endeavor in bariatric surgery. I do feel that she is doing very well and likely at her baseline, from a psychiatric standpoint and would consider her clear for surgery.\"      "

## 2022-06-21 ENCOUNTER — TELEPHONE (OUTPATIENT)
Dept: SLEEP MEDICINE | Facility: CLINIC | Age: 52
End: 2022-06-21
Payer: COMMERCIAL

## 2022-06-21 NOTE — TELEPHONE ENCOUNTER
Attempted to reach patient via phone unsuccessful left message to call back to re-schedule return visit for results.          JOSÉ MIGUEL Carbajal  [facility]

## 2022-06-22 NOTE — TELEPHONE ENCOUNTER
Attempted to reach patient via phone unsuccessful phone just kept ringing no voicemail set up.       JOSÉ MIGUEL Carbajal  [facility]

## 2022-06-27 ENCOUNTER — TELEPHONE (OUTPATIENT)
Dept: ENDOCRINOLOGY | Facility: CLINIC | Age: 52
End: 2022-06-27

## 2022-06-27 ENCOUNTER — CARE COORDINATION (OUTPATIENT)
Dept: ENDOCRINOLOGY | Facility: CLINIC | Age: 52
End: 2022-06-27

## 2022-06-27 DIAGNOSIS — F41.9 ANXIETY: Primary | ICD-10-CM

## 2022-06-27 NOTE — PROGRESS NOTES
Tasklist updated and reviewed with patient over the phone.    Bariatric Task List  Fax:  Please fax all paperwork to: [phone] -     Status:  Is patient a candidate for bariatric surgery?:  patient is not a candidate for bariatric surgery - 10/22/21  Referral from Lisa Harris DO for weight loss. bks   Cleared to schedule surgeon consult?: - See Dr Leach to discuss sleeve after psych eval, smoking cessation, and ltr from mental health provider(s) done. 6/9/22 appt.bks   Status:  surgery evaluation in process -     Surgeon: Dr Rogers Leach -     Tentative surgery month/year: To be determined after clearances. -        Insurance: Insurance:  [employer] -      Contact insurance to discuss coverage: Completed -          Patient Info: Initial Weight:  247 -     Date of Initial Weight/Height:  1/20/2022 -     Goal Weight (lbs):  237 -     Required Weight Loss:  10 -     Surgery Type:  sleeve gastrectomy -        Dietician Visits: Structured weight loss required by insurance?:  structured weight loss required -     Dietician Visit 1:  Completed - 1/20/22 in Epic. [facility]   Dietician Visit 2:  Completed - 2/18/22 in Epic. [facility]   Dietician Visit 3:  Completed - 3/18/22 appt. [facility]   Dietician Visit 4:  Completed - 4/19/22. [facility]   Dietician Visit 5:  Completed - 5/17/22. [facility]   Dietician Visit 6:  Completed - 6/14/22. [facility]   Dietician Visit additional:  Needed - Monthly until surgery for further weight loss and postop diet teaching. Call [phone] to schedule.  [facility]      Psychological Evaluation: Psych eval:  Needed - 7/6/22 Dr Hartman appt. [facility]   Therapist letter of support:  Needed - Call [phone] to schedule a therapist at [facility]. [facility]   Psychiatrist letter of support:  Completed - 6/14/22 Ilir Mason NP - cleared. Letter received via fax. [facility]   Establish care with therapist:    -  Most therapists will write a letter of support after they meet with you for a few visits.    Other:  It helps to have the  "letters of support from your mental health providers before your Dr Diaz appt. bks -        Lab Work: Complete Blood Count:  Completed - 1/24/22 in [facility]. bks   Comprehensive Metabolic Panel:  Completed - 1/24/22 in Epic. bks   Vitamin D:  Completed - 1/24/22 in Epic. bks   PTH:  Completed - 1/24/22 in Epic. bks   Hgb A1c:  Completed - 1/24/22 in Epic. bks    Lipids: Completed - 1/24/22 in Epic. bks   Nicotine Testing:    -  Okay to schedule. bks      Consults/ Clearance Sleep Medicine:  Needed - 3/25/22 Debra chamberlains. sleep study 8/11/22 sleep study; 8/30/22 Cristhian cumminst. bks   Cardiac:  Completed -     Pulmonology:  Completed - (COPD)      Testing: Sleep Study:   -  Scheduled. bks      PCP:    Follow up with PCP:    -     PCP letter of support:  Completed - Dr Chawla 3/31/22 in epic      Stopping Smoking/ Alcohol Use: Quit tobacco use (3 months smoke free)?:  Completed - Check nicotine level 1 month after stopping all nicotine products. s   Quit date:  2/15/2022       Patient Education:  Information Session:  Completed - Let us know the date you view the on-line Seminar. bks   Given \"Making your decision\" handout?:  Yes -     Given \"A Roadmap to you Weight Loss Surgery\" handout?: Yes -     Given \"Get Well Loop\" information?: Yes -     Given support group information?:    -     Attended support group?:  Needed -     Support plan in place?:  Completed - Family and friends. bks      Final Tasks to complete after the above clearances are done:  Before surgery online class:  Needed -     Before surgery online class website link:  [email or web address]   After surgery online class:  Needed -     After surgery online class website link:  [email or web address]   Nurse visit per clinic:  Needed -     History and Physical per clinic:   -  PreAssessment Clinic Visit   Final labs per clinic: Needed -        Notes: Please register for the Get Well Loop when you get an " email invitation and a surgery date.     The Get Well Loop will give you information via email or text messages that can help you be more successful before and after surgery.  It can also help ans,  wer any questions you may have.    Get Well Loop Information  [email or web address]

## 2022-06-27 NOTE — TELEPHONE ENCOUNTER
Pt is very upset her surg has been pushed out and she has issues with her task list    [phone]

## 2022-06-28 ENCOUNTER — TELEPHONE (OUTPATIENT)
Dept: INTERNAL MEDICINE | Facility: CLINIC | Age: 52
End: 2022-06-28

## 2022-06-28 NOTE — TELEPHONE ENCOUNTER
Fax received from [employer]  for med change.    Form placed in provider's in box.    Maddi Travis CMA  [facility] [place]  [phone]

## 2022-06-29 ENCOUNTER — MEDICAL CORRESPONDENCE (OUTPATIENT)
Dept: HEALTH INFORMATION MANAGEMENT | Facility: CLINIC | Age: 52
End: 2022-06-29

## 2022-07-06 ENCOUNTER — TRANSFERRED RECORDS (OUTPATIENT)
Dept: HEALTH INFORMATION MANAGEMENT | Facility: CLINIC | Age: 52
End: 2022-07-06

## 2022-07-06 LAB — PHQ9 SCORE: 0

## 2022-07-11 NOTE — PROGRESS NOTES
"Swetha Patterson is a 52 year old female who is being evaluated via a billable video visit.      The patient has been notified of following:     \"This video visit will be conducted via a call between you and your physician/provider. We have found that certain health care needs can be provided without the need for an in-person physical exam.  This service lets us provide the care you need with a video conversation.  If a prescription is necessary we can send it directly to your pharmacy.  If lab work is needed we can place an order for that and you can then stop by our lab to have the test done at a later time.    Video visits are billed at different rates depending on your insurance coverage.  Please reach out to your insurance provider with any questions.    If during the course of the call the physician/provider feels a video visit is not appropriate, you will not be charged for this service.\"    How would you like to obtain your AVS? MyChart  If the video visit is dropped, the invitation should be resent by: Text to cell phone: [phone]  Will anyone else be joining your video visit? No      Video-Visit Details    Type of service:  Video Visit    Video Start Time: 12:56 am  Video End Time: 1:24 pm    Originating Location (pt. Location): Home    Distant Location (provider location):  [facility] [place]     Platform used for Video Visit: Sojeans    During this virtual visit the patient is located in [place], patient verifies this as the location during the entirety of this visit.     Bariatric Nutrition Consultation Note    Reason For Visit: Nutrition Assessment    Swetha Patterson is a 51 year old presenting today for return bariatric nutrition consult.   Pt is interested in laparoscopic sleeve gastrectomy.    Pt has met the minimum required RD appointments but does need formal post op education once she has a surgery date.     Pt referred by NOE Blake on January 20, " "2022.    Coordination note:   Needs Psych eval  - On file 7/6/22 from Dr. Hartman  Needs letter of support from therapist and psychiatrist   Needs clearance from sleep medicine - In progress, had to reschedule sleep study due to being sick  Needs PCP letter of support - Done per pt.   Surgeon meet and greet with Dr. Leach - Done 6/9/22   No nicotine for 3 months prior to surgery - Working on  Sobriety from alcohol for 1 year - May 2022 - Met    Needs baseline labs - Done 1/24/22  10 lb weight loss from initial - Met    Patient with Co-morbidities of obesity including:  Type II DM no but does have pre-diabetes, last A1C 5.8 on 1/24/22  Renal Failure no  Sleep apnea yes  Hypertension yes  Dyslipidemia yes  Joint pain no  Back pain no  GERD no     PMH: fatty liver (nonalcoholic per pt), fibromyalgia, cholecystomy, asthma    Support System Reviewed With Patient 1/19/2022   Who do you have in your support network that can be available to help you for the first 2 weeks after surgery? I have my sister Rukhsana a very close friend of keri Mckinley and I have my dad and I also have psychiatrist my sister Rukhsana Kearns my dad and my psychiatrist again Rukhsana Milian   Who can you count on for support throughout your weight loss surgery journey? Rukhsana Kearns my dad my psychiatrist       ANTHROPOMETRICS:  Consult weight 1/20/22: 247 lbs with BMI 43.75    Estimated body mass index is 38.26 kg/m  as calculated from the following:    Height as of 6/9/22: 1.6 m (5' 3\").    Weight as of 6/14/22: 98 kg (216 lb).     Current weight: ~224 lbs, has been going up/down per pt.    Required weight loss goal pre-op: 10 lbs from initial consult weight (goal weight 237 lbs or less before surgery)       1/19/2022   I have tried the following methods to lose weight Watching portions or calories, Exercise, Physician directed program       Weight Loss Questions Reviewed With Patient 1/19/2022   How long have you been overweight? " From Middle age and beyond     MEDICATIONS FOR WEIGHT LOSS:  Semaglutide  Naltrexone      Also takes Wellbutrin    SUPPLEMENT INFORMATION:  Vit D 5000 IUS/ MWF  One daily Womens MVI - not chewable    Vit D WNL 1/24/22, hx of vit d def    Prefers oral B12 after surgery - does not like needles    NUTRITION HISTORY:  NKFA  Possible lactose intolerance     Pt s friend had sleeve surgery - is familiar with some of it through her. Went well for friend.    Nicotine: Smoked for ~40+ years per pt. Quit cigarettes two weeks ago, using patch and gum. Refer to MTM. None for 3 months prior to surgery    Alcohol: hx of abuse. Sober since May 2021. No surgery until May 2022    Please see previous RD note for more detail.    March 2022:    Discussed task list for surgery.     Doing well with dietary goals  ? Chewing food well (counting to 15-20)  ? Smaller portions   ? Continues to be alcohol free  ? Continues to drink slower   ?  fluids from meal time for most part (might have a sip here/there) - at about 20 minutes with none right now    Protein sources: pb, yogurt, cheese, chicken and turkey     Recent recall:  - Banana  - Mixed fruit  - Cheese   - 2 Tbsp pb on celery   - Vegetable Soup   - 1 slice of pepperoni pizza     PA: walking lots    April 2022:    Pt shared she has not had a BM since Friday. Thinks it is related to the Rybelsus (Semglatuide) she recently started. She is hoping to stop the medication. Scared to eat right now due to being backed up.     Didn t eat anything yesterday - today only had two pickles thus far.      Continues to do well with dietary goals and weight loss. However, pt is struggling with changes in her body and  saggy skin . Pt concerned about how she will look going forward. She is not sure how to proceed right now - hesitant but also not wanting to give up.    Has found that if she overeats she will vomit - stomach is helping her know when to stop.     May 2022:  Was struggling with  night eating - better now. Increased dose of medication and none since.    Continues to do well with goals -  fluids during meal time, chewing well, setting utensil down, making good food choices.    Has practiced eating on smaller plates (saucer size)    Doing well resisting temptations.     Fluids: water, 1 coke zero/day, V8 juice occ, sugar-free flavorings in water    BM - was constipated but made some changes (more fiber, etc)     Nicotine: none - was using patches but stopped     PA: walking or moving around house    June 2022:    Really sick - bed for 3 days. Throws up anytime she tries to get up. Can t hold any food down - trying to stay hydrated. Lost 5 lbs. At home nurse came yesterday - said temperature was only 85 degrees. Nurse checked 3x - both temples and wrists. Then went up to 95. Temp today is 99.1. Checking temp about every 4 hrs. Checked for covid - negative.     Ate some toast today and munching on crackers. Plans to try ramen noodles next. Also doing popsicles. Also doing some electrolyte water. Recommended she try some Ensure.     ? Cut out all carbonated beverages.  ? Continues with smaller portions.   ? Bought some protein shakes for after surgery (Ensure Max protein).   ? No alcohol.  ? No nicotine  ?  fluids from meal and none for up to 20 min after (working towards 30 min)    Normal breakfast before getting sick was egg whites. Not keeping sweets in house. Eating well per pt - will grab a yogurt if craving something sweet.     Weight was up to 230 lbs, noticed an increase in cravings. Started Naltrexone. Now down to 216 (5 lb loss since being sick).     Lab appt scheduled for 7/18. Stopped using nicotine patches right after seeing Rashmi 4/27/22    Recently got an exercise bike from a friend.    July 2022:  Going on vacation for 3 weeks. Leaving later today. Heading to [place]     Continues with goals  ? Lots of vegetables  ? Leaner protein sources (turkey as opposed  to beef)  ? Smaller portions  ? No alcohol   ? No smoking (is exposed to second hand smoke from friends/dad)  ?  fluids from meal time  ? Staying well hydrated   ? No pop  ? No juices     Tried milk and bread - did not sit well per pt.     Recent recall:  ? Mixed vegetables  ? 1/2 turkey burger (no bun)  ? Brussel sprouts   ? Yogurt   ? Orange   ? Water    Going to start working with Mercedes Whitfield (first appt 9/29/22)    Plans to look for a MVI with iron this month. Re-reviewed needs.    Pt reports mental health is good - only taking clonazepam 1x/day (use to be 2x). No depression, some anxiety but mostly due to excitement for surgery.     PA: lots of walking, got a foot exercise thing as well  Bought some small dumbells for after surgery.    Recall Diet Questions Reviewed With Patient 1/19/2022   Describe what you typically consume for breakfast (typical or most recent): I usually don't eat much for breakfast maybe a piece of toast.   Describe what you typically consume for lunch (typical or most recent): Sometimes hot dogs or macaroni and cheese or hamburgers or fast food or eggs or just about anything   Describe what you typically consume for supper (typical or most recent): Always have to have meat with my dinner potato vegetable if it's corn or green beans but I prefer peas and then I snack at night time before bed because I'm so hungry   Describe what you typically consume as snacks (typical or most recent): Cereal bars chips sometimes fruit a hamburger fast food   How many ounces of water, or other low calorie drinks, do you drink daily (8 oz=1 glass)? 48 oz   How many ounces of caffeine (coffee, tea, pop) do you drink daily (8 oz=1 glass)? 8 oz   How many ounces of carbonated (pop, beer, sparkling water) drinks do you drinky daily (8 oz=1 glass)? 8 oz   How many ounces of juice, pop, sweet tea, sports drinks, protein drinks, other sweetened drinks, do you drink daily (8 oz=1 glass)? 24 oz   How  many ounces of milk do you drink daily (8 oz=1 glass) 8 oz   Please indicate the type of milk: 2%   How often do you drink alcohol? Never   If you do drink alcohol, how many drinks might you have in a day? (one drink = 5 oz. wine, 1 can/bottle of beer, 1 shot liquor) -       Eating Habits 1/19/2022   Do you have any dietary restrictions? Yes   Do you currently binge eat (eat a large amount of food in a short time)? -   Are you an emotional eater? No   Do you get up to eat after falling asleep? Yes   What foods do you crave? I crave Pizza macaroni and cheese [employer] hamburger french fries but I do still eat my vegetables     Dining Out History Reviewed With Patient 1/19/2022   How often do you dine out? A couple of times a week.   Where do you dine out? (select all that apply) fast food chains, take out   What types of food do you order when you dine out? Some type of meat potato and of course you got to have a green beans or corn       Physical Activity Reviewed With Patient 2/22/2022   How often do you exercise? Daily   What is the duration of your exercise (in minutes)? 30 Minutes   What types of exercise do you do? walking, other   What keeps you from being more active?  I am as active as I can possbily be       NUTRITION DIAGNOSIS:  Obesity r/t long history of positive energy balance aeb BMI >30 kg/m2.    INTERVENTION:  Intervention Provided/Education Provided on post-op diet guidelines, vitamins/minerals essential post-operatively, GI anatomy of bariatric surgeries, ways to help prepare for post-op diet guidelines pre-operatively, portion/calorie-control, mindful eating and sources of protein.  Patient demonstrates understanding. Provided pt with list of goals RD contact information.      Questions Reviewed With Patient 1/19/2022   How ready are you to make changes regarding your weight? Number 1 = Not ready at all to make changes up to 10 = very ready. 10   How confident are you that you can change? 1 = Not  confident that you will be successful making changes up to 10 = very confident. 10       Expected Engagement: good    GOALS:  1. Keep up the great work!    - No alcohol   - No smoking   - Seperating fluids from meal time    - Sipping fluids   - Chewing/pace   - No carbonation after surgery    2. Will need 60 grams of protein/day minimum after surgery    Protein Sources   [email or web address]       High Fiber Foods:  Bran cereal, 1/3 cup, 8.6 gm fiber   Cooked kidney beans   cup 7.9 gm  Cooked lentils   cup 7.8 gm  Cooked black beans   cup 7.6 gm  Canned chickpeas   cup 5.3 gm  Baked beans   cup 5.2 gm  Pear 1 5.1 gm  Soybeans   cup 5.1 gm  Quinoa   cup 5 gm  Baked sweet potato, with skin 1 medium 4.8 gm  Baked potato, with skin 1 medium 4.4 gm  Cooked frozen green peas   cup 4.4 gm  Bulgur   cup 4.1 gm  Cooked frozen mixed vegetables   cup 4 gm  Raspberries   cup 4 gm  Blackberries   cup 3.8 gm  Almonds 1 oz 3.5 gm  Cooked frozen spinach   cup 3.5 gm  Vegetable or soy yony 1 each 3.4 gm  Apple 1 medium 3.3 gm  Dried dates 5 pieces 3.3 gm    Surgery resources:  Diet Guidelines after Weight Loss Surgery  [email or web address]     Post-op Diet Advancement Schedule:  Clear Liquid Diet (stage 1):  TBD   *No RED, PROTEIN or PULP day before surgery  Low-Fat Full Liquid Diet (stage 2): TBD (2 weeks total between clear/liquid)  Pureed Diet (stage 3): TBD (2 weeks)  Soft Diet (stage 4): TBD (4 weeks)  Regular Diet (stage 5): TBD     Diabetes Plate Method:  https://www.diabetesfoodhub.org/articles/what-is-the-diabetes-plate-method.html    Your Stage 1 Diet: Clear Liquids  [email or web address]     Your Stage 2 Diet: Low-fat Full Liquids  [email or web address]     Your Stage 3 Diet: Pureed Foods  [email or web address]     Pureed Recipes  [email or web address]    Your Stage 4 Diet: Soft Foods  [email or web address]    Your Stage 5 Diet: Regular  Foods  [email or web address]      Follow up:   Tuesday, August 9th at 1:00 pm    Time spent with patient: 28 minutes.  SHELLY Neal, RD, LD

## 2022-07-12 ENCOUNTER — VIRTUAL VISIT (OUTPATIENT)
Dept: ENDOCRINOLOGY | Facility: CLINIC | Age: 52
End: 2022-07-12
Payer: COMMERCIAL

## 2022-07-12 VITALS — WEIGHT: 224 LBS | BODY MASS INDEX: 39.68 KG/M2

## 2022-07-12 DIAGNOSIS — E66.812 OBESITY, CLASS II, BMI 35-39.9: ICD-10-CM

## 2022-07-12 DIAGNOSIS — Z71.3 NUTRITIONAL COUNSELING: Primary | ICD-10-CM

## 2022-07-12 PROCEDURE — 97803 MED NUTRITION INDIV SUBSEQ: CPT | Mod: GT | Performed by: DIETITIAN, REGISTERED

## 2022-07-12 NOTE — LETTER
"7/12/2022       RE: Swetha Patterson  [id number]  [facility] [address]     Dear Colleague,    Thank you for referring your patient, Swetha Patterson, to the [facility] [place] at [facility]. Please see a copy of my visit note below.    Swetha Patterson is a 52 year old female who is being evaluated via a billable video visit.      The patient has been notified of following:     \"This video visit will be conducted via a call between you and your physician/provider. We have found that certain health care needs can be provided without the need for an in-person physical exam.  This service lets us provide the care you need with a video conversation.  If a prescription is necessary we can send it directly to your pharmacy.  If lab work is needed we can place an order for that and you can then stop by our lab to have the test done at a later time.    Video visits are billed at different rates depending on your insurance coverage.  Please reach out to your insurance provider with any questions.    If during the course of the call the physician/provider feels a video visit is not appropriate, you will not be charged for this service.\"    How would you like to obtain your AVS? MyChart  If the video visit is dropped, the invitation should be resent by: Text to cell phone: [phone]  Will anyone else be joining your video visit? No      Video-Visit Details    Type of service:  Video Visit    Video Start Time: 12:56 am  Video End Time: 1:24 pm    Originating Location (pt. Location): Home    Distant Location (provider location):  [facility] [place]     Platform used for Video Visit: [employer]    During this virtual visit the patient is located in [place], patient verifies this as the location during the entirety of this visit.     Bariatric Nutrition Consultation Note    Reason For Visit: Nutrition " "Assessment    Swetha Patterosn is a 51 year old presenting today for return bariatric nutrition consult.   Pt is interested in laparoscopic sleeve gastrectomy.    Pt has met the minimum required RD appointments but does need formal post op education once she has a surgery date.     Pt referred by NOE Blake on January 20, 2022.    Coordination note:   Needs Psych eval  - On file 7/6/22 from Dr. Hartman  Needs letter of support from therapist and psychiatrist   Needs clearance from sleep medicine - In progress, had to reschedule sleep study due to being sick  Needs PCP letter of support - Done per pt.   Surgeon meet and greet with Dr. Leach - Done 6/9/22   No nicotine for 3 months prior to surgery - Working on  Sobriety from alcohol for 1 year - May 2022 - Met    Needs baseline labs - Done 1/24/22  10 lb weight loss from initial - Met    Patient with Co-morbidities of obesity including:  Type II DM no but does have pre-diabetes, last A1C 5.8 on 1/24/22  Renal Failure no  Sleep apnea yes  Hypertension yes  Dyslipidemia yes  Joint pain no  Back pain no  GERD no     PMH: fatty liver (nonalcoholic per pt), fibromyalgia, cholecystomy, asthma    Support System Reviewed With Patient 1/19/2022   Who do you have in your support network that can be available to help you for the first 2 weeks after surgery? I have my sister Rukhsana a very close friend of keri Mckinley and I have my dad and I also have psychiatrist my sister Rukhsana Kearns my dad and my psychiatrist again Rukhsana Milian   Who can you count on for support throughout your weight loss surgery journey? Rukhsana Kearns my dad my psychiatrist       ANTHROPOMETRICS:  Consult weight 1/20/22: 247 lbs with BMI 43.75    Estimated body mass index is 38.26 kg/m  as calculated from the following:    Height as of 6/9/22: 1.6 m (5' 3\").    Weight as of 6/14/22: 98 kg (216 lb).     Current weight: ~224 lbs, has been going up/down per pt.    Required weight " loss goal pre-op: 10 lbs from initial consult weight (goal weight 237 lbs or less before surgery)       1/19/2022   I have tried the following methods to lose weight Watching portions or calories, Exercise, Physician directed program       Weight Loss Questions Reviewed With Patient 1/19/2022   How long have you been overweight? From Middle age and beyond     MEDICATIONS FOR WEIGHT LOSS:  Semaglutide  Naltrexone      Also takes Wellbutrin    SUPPLEMENT INFORMATION:  Vit D 5000 IUS/ MWF  One daily Womens MVI - not chewable    Vit D WNL 1/24/22, hx of vit d def    Prefers oral B12 after surgery - does not like needles    NUTRITION HISTORY:  NKFA  Possible lactose intolerance     Pt s friend had sleeve surgery - is familiar with some of it through her. Went well for friend.    Nicotine: Smoked for ~40+ years per pt. Quit cigarettes two weeks ago, using patch and gum. Refer to MTM. None for 3 months prior to surgery    Alcohol: hx of abuse. Sober since May 2021. No surgery until May 2022    Please see previous RD note for more detail.    March 2022:    Discussed task list for surgery.     Doing well with dietary goals  ? Chewing food well (counting to 15-20)  ? Smaller portions   ? Continues to be alcohol free  ? Continues to drink slower   ?  fluids from meal time for most part (might have a sip here/there) - at about 20 minutes with none right now    Protein sources: pb, yogurt, cheese, chicken and turkey     Recent recall:  - Banana  - Mixed fruit  - Cheese   - 2 Tbsp pb on celery   - Vegetable Soup   - 1 slice of pepperoni pizza     PA: walking lots    April 2022:    Pt shared she has not had a BM since Friday. Thinks it is related to the Rybelsus (Semglatuide) she recently started. She is hoping to stop the medication. Scared to eat right now due to being backed up.     Didn t eat anything yesterday - today only had two pickles thus far.      Continues to do well with dietary goals and weight loss.  However, pt is struggling with changes in her body and  saggy skin . Pt concerned about how she will look going forward. She is not sure how to proceed right now - hesitant but also not wanting to give up.    Has found that if she overeats she will vomit - stomach is helping her know when to stop.     May 2022:  Was struggling with night eating - better now. Increased dose of medication and none since.    Continues to do well with goals -  fluids during meal time, chewing well, setting utensil down, making good food choices.    Has practiced eating on smaller plates (saucer size)    Doing well resisting temptations.     Fluids: water, 1 coke zero/day, V8 juice occ, sugar-free flavorings in water    BM - was constipated but made some changes (more fiber, etc)     Nicotine: none - was using patches but stopped     PA: walking or moving around house    June 2022:    Really sick - bed for 3 days. Throws up anytime she tries to get up. Can t hold any food down - trying to stay hydrated. Lost 5 lbs. At home nurse came yesterday - said temperature was only 85 degrees. Nurse checked 3x - both temples and wrists. Then went up to 95. Temp today is 99.1. Checking temp about every 4 hrs. Checked for covid - negative.     Ate some toast today and munching on crackers. Plans to try ramen noodles next. Also doing popsicles. Also doing some electrolyte water. Recommended she try some Ensure.     ? Cut out all carbonated beverages.  ? Continues with smaller portions.   ? Bought some protein shakes for after surgery (Ensure Max protein).   ? No alcohol.  ? No nicotine  ?  fluids from meal and none for up to 20 min after (working towards 30 min)    Normal breakfast before getting sick was egg whites. Not keeping sweets in house. Eating well per pt - will grab a yogurt if craving something sweet.     Weight was up to 230 lbs, noticed an increase in cravings. Started Naltrexone. Now down to 216 (5 lb loss since being  sick).     Lab appt scheduled for 7/18. Stopped using nicotine patches right after seeing Rashmi 4/27/22    Recently got an exercise bike from a friend.    July 2022:  Going on vacation for 3 weeks. Leaving later today. Heading to [place]     Continues with goals  ? Lots of vegetables  ? Leaner protein sources (turkey as opposed to beef)  ? Smaller portions  ? No alcohol   ? No smoking (is exposed to second hand smoke from friends/dad)  ?  fluids from meal time  ? Staying well hydrated   ? No pop  ? No juices     Tried milk and bread - did not sit well per pt.     Recent recall:  ? Mixed vegetables  ? 1/2 turkey burger (no bun)  ? Brussel sprouts   ? Yogurt   ? Orange   ? Water    Going to start working with Mercedes Mejíaer (first appt 9/29/22)    Plans to look for a MVI with iron this month. Re-reviewed needs.    Pt reports mental health is good - only taking clonazepam 1x/day (use to be 2x). No depression, some anxiety but mostly due to excitement for surgery.     PA: lots of walking, got a foot exercise thing as well  Bought some small dumbells for after surgery.    Recall Diet Questions Reviewed With Patient 1/19/2022   Describe what you typically consume for breakfast (typical or most recent): I usually don't eat much for breakfast maybe a piece of toast.   Describe what you typically consume for lunch (typical or most recent): Sometimes hot dogs or macaroni and cheese or hamburgers or fast food or eggs or just about anything   Describe what you typically consume for supper (typical or most recent): Always have to have meat with my dinner potato vegetable if it's corn or green beans but I prefer peas and then I snack at night time before bed because I'm so hungry   Describe what you typically consume as snacks (typical or most recent): Cereal bars chips sometimes fruit a hamburger fast food   How many ounces of water, or other low calorie drinks, do you drink daily (8 oz=1 glass)? 48 oz   How many  ounces of caffeine (coffee, tea, pop) do you drink daily (8 oz=1 glass)? 8 oz   How many ounces of carbonated (pop, beer, sparkling water) drinks do you drinky daily (8 oz=1 glass)? 8 oz   How many ounces of juice, pop, sweet tea, sports drinks, protein drinks, other sweetened drinks, do you drink daily (8 oz=1 glass)? 24 oz   How many ounces of milk do you drink daily (8 oz=1 glass) 8 oz   Please indicate the type of milk: 2%   How often do you drink alcohol? Never   If you do drink alcohol, how many drinks might you have in a day? (one drink = 5 oz. wine, 1 can/bottle of beer, 1 shot liquor) -       Eating Habits 1/19/2022   Do you have any dietary restrictions? Yes   Do you currently binge eat (eat a large amount of food in a short time)? -   Are you an emotional eater? No   Do you get up to eat after falling asleep? Yes   What foods do you crave? I crave Pizza macaroni and cheese Subway hamburger french fries but I do still eat my vegetables     Dining Out History Reviewed With Patient 1/19/2022   How often do you dine out? A couple of times a week.   Where do you dine out? (select all that apply) fast food chains, take out   What types of food do you order when you dine out? Some type of meat potato and of course you got to have a green beans or corn       Physical Activity Reviewed With Patient 2/22/2022   How often do you exercise? Daily   What is the duration of your exercise (in minutes)? 30 Minutes   What types of exercise do you do? walking, other   What keeps you from being more active?  I am as active as I can possbily be       NUTRITION DIAGNOSIS:  Obesity r/t long history of positive energy balance aeb BMI >30 kg/m2.    INTERVENTION:  Intervention Provided/Education Provided on post-op diet guidelines, vitamins/minerals essential post-operatively, GI anatomy of bariatric surgeries, ways to help prepare for post-op diet guidelines pre-operatively, portion/calorie-control, mindful eating and sources of  protein.  Patient demonstrates understanding. Provided pt with list of goals RD contact information.      Questions Reviewed With Patient 1/19/2022   How ready are you to make changes regarding your weight? Number 1 = Not ready at all to make changes up to 10 = very ready. 10   How confident are you that you can change? 1 = Not confident that you will be successful making changes up to 10 = very confident. 10       Expected Engagement: good    GOALS:  1. Keep up the great work!    - No alcohol   - No smoking   - Seperating fluids from meal time    - Sipping fluids   - Chewing/pace   - No carbonation after surgery    2. Will need 60 grams of protein/day minimum after surgery    Protein Sources   [email or web address]       High Fiber Foods:  Bran cereal, 1/3 cup, 8.6 gm fiber   Cooked kidney beans   cup 7.9 gm  Cooked lentils   cup 7.8 gm  Cooked black beans   cup 7.6 gm  Canned chickpeas   cup 5.3 gm  Baked beans   cup 5.2 gm  Pear 1 5.1 gm  Soybeans   cup 5.1 gm  Quinoa   cup 5 gm  Baked sweet potato, with skin 1 medium 4.8 gm  Baked potato, with skin 1 medium 4.4 gm  Cooked frozen green peas   cup 4.4 gm  Bulgur   cup 4.1 gm  Cooked frozen mixed vegetables   cup 4 gm  Raspberries   cup 4 gm  Blackberries   cup 3.8 gm  Almonds 1 oz 3.5 gm  Cooked frozen spinach   cup 3.5 gm  Vegetable or soy yony 1 each 3.4 gm  Apple 1 medium 3.3 gm  Dried dates 5 pieces 3.3 gm    Surgery resources:  Diet Guidelines after Weight Loss Surgery  [email or web address]     Post-op Diet Advancement Schedule:  Clear Liquid Diet (stage 1):  TBD   *No RED, PROTEIN or PULP day before surgery  Low-Fat Full Liquid Diet (stage 2): TBD (2 weeks total between clear/liquid)  Pureed Diet (stage 3): TBD (2 weeks)  Soft Diet (stage 4): TBD (4 weeks)  Regular Diet (stage 5): TBD     Diabetes Plate Method:  https://www.diabetesfoodhub.org/articles/what-is-the-diabetes-plate-method.html    Your Stage 1 Diet: Clear  Liquids  [email or web address]     Your Stage 2 Diet: Low-fat Full Liquids  [email or web address]     Your Stage 3 Diet: Pureed Foods  [email or web address]     Pureed Recipes  [email or web address]    Your Stage 4 Diet: Soft Foods  [email or web address]    Your Stage 5 Diet: Regular Foods  [email or web address]      Follow up:   Tuesday, August 9th at 1:00 pm    Time spent with patient: 28 minutes.  SHELLY Neal, RD, LD

## 2022-07-12 NOTE — PATIENT INSTRUCTIONS
GOALS:  1. Keep up the great work!    - No alcohol   - No smoking   - Seperating fluids from meal time    - Sipping fluids   - Chewing/pace   - No carbonation after surgery    2. Will need 60 grams of protein/day minimum after surgery    Protein Sources   [email or web address]       High Fiber Foods:  Bran cereal, 1/3 cup, 8.6 gm fiber   Cooked kidney beans   cup 7.9 gm  Cooked lentils   cup 7.8 gm  Cooked black beans   cup 7.6 gm  Canned chickpeas   cup 5.3 gm  Baked beans   cup 5.2 gm  Pear 1 5.1 gm  Soybeans   cup 5.1 gm  Quinoa   cup 5 gm  Baked sweet potato, with skin 1 medium 4.8 gm  Baked potato, with skin 1 medium 4.4 gm  Cooked frozen green peas   cup 4.4 gm  Bulgur   cup 4.1 gm  Cooked frozen mixed vegetables   cup 4 gm  Raspberries   cup 4 gm  Blackberries   cup 3.8 gm  Almonds 1 oz 3.5 gm  Cooked frozen spinach   cup 3.5 gm  Vegetable or soy yony 1 each 3.4 gm  Apple 1 medium 3.3 gm  Dried dates 5 pieces 3.3 gm    Surgery resources:  Diet Guidelines after Weight Loss Surgery  [email or web address]     Post-op Diet Advancement Schedule:  Clear Liquid Diet (stage 1):  TBD   *No RED, PROTEIN or PULP day before surgery  Low-Fat Full Liquid Diet (stage 2): TBD (2 weeks total between clear/liquid)  Pureed Diet (stage 3): TBD (2 weeks)  Soft Diet (stage 4): TBD (4 weeks)  Regular Diet (stage 5): TBD     Diabetes Plate Method:  https://www.diabetesfoodhub.org/articles/what-is-the-diabetes-plate-method.html    Your Stage 1 Diet: Clear Liquids  [email or web address]     Your Stage 2 Diet: Low-fat Full Liquids  [email or web address]     Your Stage 3 Diet: Pureed Foods  [email or web address]     Pureed Recipes  [email or web address]    Your Stage 4 Diet: Soft Foods  [email or web address]    Your Stage 5 Diet: Regular Foods  [email or web address]    Follow up:  Tuesday, August 9th at 1:00 pm    SHELLY Henning, RD, LD  Clinic #: [phone]

## 2022-07-19 NOTE — TELEPHONE ENCOUNTER
Impression: Long term (current) use of oral antidiabetic drugs: Z79.84.  Plan: See plan E11.9 Reason for visit: difficulty urinating     Relevant information: pt is self referred    Records/imaging/labs: UA/UCs available    Pt called: No need for a call    Rooming: dip/pvr

## 2022-07-21 ENCOUNTER — TELEPHONE (OUTPATIENT)
Dept: PULMONOLOGY | Facility: CLINIC | Age: 52
End: 2022-07-21

## 2022-07-21 NOTE — TELEPHONE ENCOUNTER
Called patient and patient did not answer. Patient's phone did not go to voicemail, therefore I could not leave a voicemail. Patient needs to reschedule appt with Dr. Tolentino on 9/2/22.

## 2022-07-25 ENCOUNTER — TELEPHONE (OUTPATIENT)
Dept: PULMONOLOGY | Facility: CLINIC | Age: 52
End: 2022-07-25

## 2022-07-25 NOTE — TELEPHONE ENCOUNTER
LVM regarding need to reschedule appt with Dr. Tolentino on 9/2/22. Left direct call back phone number to reschedule.

## 2022-07-27 ENCOUNTER — TELEPHONE (OUTPATIENT)
Dept: PULMONOLOGY | Facility: CLINIC | Age: 52
End: 2022-07-27

## 2022-07-27 NOTE — TELEPHONE ENCOUNTER
Called patient but patient did not answer and patient's phone did not go to voicemail, therefore I could not leave a voicemail message. Patient needs to reschedule appt on 9/2/22 with Dr. Tolentino. This is our third and final attempt to reach patient. I will be cancelling appt with Dr. Tolentino on 9/2/22 and mailing patient a confirmation letter of the reschedule/cancellation.

## 2022-07-29 ENCOUNTER — TELEPHONE (OUTPATIENT)
Dept: INTERNAL MEDICINE | Facility: CLINIC | Age: 52
End: 2022-07-29

## 2022-07-29 DIAGNOSIS — J44.9 COPD, MODERATE (H): ICD-10-CM

## 2022-07-29 DIAGNOSIS — F41.9 ANXIETY: ICD-10-CM

## 2022-07-29 DIAGNOSIS — J30.89 DUST ALLERGY: ICD-10-CM

## 2022-07-29 NOTE — TELEPHONE ENCOUNTER
Mable RN with [facility] calling.    1.  Requesting continuation of care orders for skilled nurse visits 1x/wk x 2 wks.    2.  FYI--Will discharge patient from homecare on 8/12/22 due to homecare does not have mental health nursing services.  Mable recommended to patient to find a homecare company who has mental health nursing services.    Please advise on #1, thanks.

## 2022-08-01 NOTE — TELEPHONE ENCOUNTER
Hydroxyzine Hcl 50 mg tab  Routing refill request to provider for review/approval because:  Filled at same pharmacy 7/19/2022    Montelukast Sodium  Routing refill request to provider for review/approval because:  Filled 04/2022    Appointments in Next Year      Aug 09, 2022  1:00 PM  (Arrive by 12:45 PM)  Video Visit with Rika Mejía RD  [facility] [place] ([facility] [place] ) [phone]     Aug 11, 2022  8:00 PM  PSG DIAGNOSTIC W/TCM with BED 3 SH SLEEP  [facility] ([facility] ) [phone]     Aug 29, 2022 10:30 AM  INR LAB with RI LAB  [facility] ([facility] ) [phone]     Aug 30, 2022  2:00 PM  (Arrive by 1:45 PM)  Return Sleep Patient with Cristhian Whiting MD  [facility] ([facility] ) [phone]     Aug 31, 2022  8:00 AM  (Arrive by 7:40 AM)  Adult Preventative Visit with Roro Chawla MD  [facility] ([facility] ) [phone]     Sep 29, 2022 11:00 AM  (Arrive by 10:45 AM)  Diagnostic Evaluation with Mercedes Whitfield, PhD  [facility] ([facility] [place] ) [phone]

## 2022-08-02 ENCOUNTER — TELEPHONE (OUTPATIENT)
Dept: INTERNAL MEDICINE | Facility: CLINIC | Age: 52
End: 2022-08-02

## 2022-08-02 ENCOUNTER — MEDICAL CORRESPONDENCE (OUTPATIENT)
Dept: HEALTH INFORMATION MANAGEMENT | Facility: CLINIC | Age: 52
End: 2022-08-02

## 2022-08-02 DIAGNOSIS — R21 RASH: ICD-10-CM

## 2022-08-02 DIAGNOSIS — B37.0 ORAL THRUSH: Primary | ICD-10-CM

## 2022-08-02 RX ORDER — HYDROXYZINE HYDROCHLORIDE 50 MG/1
TABLET, FILM COATED ORAL
Qty: 70 TABLET | Refills: 0 | Status: SHIPPED | OUTPATIENT
Start: 2022-08-02 | End: 2022-08-15

## 2022-08-02 RX ORDER — NYSTATIN 100000/ML
500000 SUSPENSION, ORAL (FINAL DOSE FORM) ORAL 4 TIMES DAILY
Qty: 60 ML | Refills: 1 | Status: SHIPPED | OUTPATIENT
Start: 2022-08-02 | End: 2023-01-27

## 2022-08-02 RX ORDER — MONTELUKAST SODIUM 10 MG/1
TABLET ORAL
Qty: 90 TABLET | Refills: 0 | Status: SHIPPED | OUTPATIENT
Start: 2022-08-02 | End: 2022-11-25

## 2022-08-02 RX ORDER — NYSTATIN 100000 [USP'U]/G
POWDER TOPICAL
Qty: 45 G | Refills: 3 | Status: SHIPPED | OUTPATIENT
Start: 2022-08-02 | End: 2022-12-21

## 2022-08-02 NOTE — TELEPHONE ENCOUNTER
Patient needs a refill of Nystatin powder for breast rash and medication for oral thrush. Please call patient if any questions.     Phone#: [phone]

## 2022-08-03 ENCOUNTER — TELEPHONE (OUTPATIENT)
Dept: INTERNAL MEDICINE | Facility: CLINIC | Age: 52
End: 2022-08-03

## 2022-08-03 NOTE — TELEPHONE ENCOUNTER
Patient is calling to ask if she can have an appointment with Dr Low either in clinic or virtual sooner than 8/30/22. She wants to discuss getting her hernia removed and also she doesn't want to have bariatric surgery because she is getting scared about it. She says she has lost 35 pounds on her own.

## 2022-08-04 ENCOUNTER — TELEPHONE (OUTPATIENT)
Dept: INTERNAL MEDICINE | Facility: CLINIC | Age: 52
End: 2022-08-04

## 2022-08-09 ENCOUNTER — VIRTUAL VISIT (OUTPATIENT)
Dept: ENDOCRINOLOGY | Facility: CLINIC | Age: 52
End: 2022-08-09
Payer: COMMERCIAL

## 2022-08-09 VITALS — WEIGHT: 220 LBS | BODY MASS INDEX: 38.97 KG/M2

## 2022-08-09 DIAGNOSIS — Z71.3 NUTRITIONAL COUNSELING: Primary | ICD-10-CM

## 2022-08-09 DIAGNOSIS — E66.812 OBESITY, CLASS II, BMI 35-39.9: ICD-10-CM

## 2022-08-09 PROCEDURE — 97803 MED NUTRITION INDIV SUBSEQ: CPT | Mod: TEL | Performed by: DIETITIAN, REGISTERED

## 2022-08-09 NOTE — LETTER
"8/9/2022       RE: Swetha Patterson  [id number]  [facility] [place] [address]     Dear Colleague,    Thank you for referring your patient, Swetha Patterson, to the [facility] [place] at [facility]. Please see a copy of my visit note below.    Swetha Patterson is a 52 year old female who is being evaluated via a billable telephone visit.     The patient has been notified of following:     \"This telephone visit will be conducted via a call between you and your physician/provider. We have found that certain health care needs can be provided without the need for a physical exam.  This service lets us provide the care you need with a short phone conversation.  If a prescription is necessary we can send it directly to your pharmacy.  If lab work is needed we can place an order for that and you can then stop by our lab to have the test done at a later time.    Telephone visits are billed at different rates depending on your insurance coverage. During this emergency period, for some insurers they may be billed the same as an in-person visit.  Please reach out to your insurance provider with any questions.    If during the course of the call the physician/provider feels a telephone visit is not appropriate, you will not be charged for this service.\"    Patient has given verbal consent for Telephone visit?  Yes    Phone call duration: 28 minutes    During this virtual visit the patient is located in [place], patient verifies this as the location during the entirety of this visit.       Bariatric Nutrition Consultation Note    Reason For Visit: Nutrition Assessment    Swetha Patterson is a 51 year old presenting today for return bariatric nutrition consult.   Pt is interested in laparoscopic sleeve gastrectomy.    Pt has met the minimum required RD appointments but does need formal post op education once she has a surgery date.     Pt referred by Rashmi " "NOE Del Rio on January 20, 2022.    Coordination note:   Needs Psych eval  - On file 7/6/22 from Dr. Hartman  Needs letter of support from therapist and psychiatrist   Needs clearance from sleep medicine - In progress, had to reschedule sleep study due to being sick  Needs PCP letter of support - Done per pt.   Surgeon meet and greet with Dr. Leach - Done 6/9/22   No nicotine for 3 months prior to surgery - Working on  Sobriety from alcohol for 1 year - May 2022 - Met    Needs baseline labs - Done 1/24/22  10 lb weight loss from initial - Met    Patient with Co-morbidities of obesity including:  Type II DM no but does have pre-diabetes, last A1C 5.8 on 1/24/22  Renal Failure no  Sleep apnea yes  Hypertension yes  Dyslipidemia yes  Joint pain no  Back pain no  GERD no     PMH: fatty liver (nonalcoholic per pt), fibromyalgia, cholecystomy, asthma    Support System Reviewed With Patient 1/19/2022   Who do you have in your support network that can be available to help you for the first 2 weeks after surgery? I have my sister Rukhsana a very close friend of keri OrozcoNolnamy and I have my dad and I also have psychiatrist my sister Rukhsana Kearns my dad and my psychiatrist again Rukhsana Milian   Who can you count on for support throughout your weight loss surgery journey? Rukhsana Kearns my dad my psychiatrist       ANTHROPOMETRICS:  Consult weight 1/20/22: 247 lbs with BMI 43.75    Estimated body mass index is 38.97 kg/m  as calculated from the following:    Height as of 6/9/22: 1.6 m (5' 3\").    Weight as of this encounter: 99.8 kg (220 lb).     Current weight: going between 215-224 per pt    Required weight loss goal pre-op: 10 lbs from initial consult weight (goal weight 237 lbs or less before surgery)       1/19/2022   I have tried the following methods to lose weight Watching portions or calories, Exercise, Physician directed program       Weight Loss Questions Reviewed With Patient 1/19/2022   How long have " you been overweight? From Middle age and beyond     MEDICATIONS FOR WEIGHT LOSS:  Semaglutide  Naltrexone      Also takes Wellbutrin    SUPPLEMENT INFORMATION:  Vit D 5000 IUS/ MWF  One daily Womens MVI - not chewable    Vit D WNL 1/24/22, hx of vit d def    Prefers oral B12 after surgery - does not like needles    NUTRITION HISTORY:  NKFA  Possible lactose intolerance     Pt s friend had sleeve surgery - is familiar with some of it through her. Went well for friend.    Nicotine: Smoked for ~40+ years per pt. Quit cigarettes two weeks ago, using patch and gum. Refer to MTM. None for 3 months prior to surgery    Alcohol: hx of abuse. Sober since May 2021. No surgery until May 2022    Please see previous RD note for more detail.    March 2022:    Discussed task list for surgery.     Doing well with dietary goals  ? Chewing food well (counting to 15-20)  ? Smaller portions   ? Continues to be alcohol free  ? Continues to drink slower   ?  fluids from meal time for most part (might have a sip here/there) - at about 20 minutes with none right now    Protein sources: pb, yogurt, cheese, chicken and turkey     Recent recall:  - Banana  - Mixed fruit  - Cheese   - 2 Tbsp pb on celery   - Vegetable Soup   - 1 slice of pepperoni pizza     PA: walking lots    April 2022:    Pt shared she has not had a BM since Friday. Thinks it is related to the Rybelsus (Semglatuide) she recently started. She is hoping to stop the medication. Scared to eat right now due to being backed up.     Didn t eat anything yesterday - today only had two pickles thus far.      Continues to do well with dietary goals and weight loss. However, pt is struggling with changes in her body and  saggy skin . Pt concerned about how she will look going forward. She is not sure how to proceed right now - hesitant but also not wanting to give up.    Has found that if she overeats she will vomit - stomach is helping her know when to stop.     May  2022:  Was struggling with night eating - better now. Increased dose of medication and none since.    Continues to do well with goals -  fluids during meal time, chewing well, setting utensil down, making good food choices.    Has practiced eating on smaller plates (saucer size)    Doing well resisting temptations.     Fluids: water, 1 coke zero/day, V8 juice occ, sugar-free flavorings in water    BM - was constipated but made some changes (more fiber, etc)     Nicotine: none - was using patches but stopped     PA: walking or moving around house    June 2022:    Really sick - bed for 3 days. Throws up anytime she tries to get up. Can t hold any food down - trying to stay hydrated. Lost 5 lbs. At home nurse came yesterday - said temperature was only 85 degrees. Nurse checked 3x - both temples and wrists. Then went up to 95. Temp today is 99.1. Checking temp about every 4 hrs. Checked for covid - negative.     Ate some toast today and munching on crackers. Plans to try ramen noodles next. Also doing popsicles. Also doing some electrolyte water. Recommended she try some Ensure.     ? Cut out all carbonated beverages.  ? Continues with smaller portions.   ? Bought some protein shakes for after surgery (Ensure Max protein).   ? No alcohol.  ? No nicotine  ?  fluids from meal and none for up to 20 min after (working towards 30 min)    Normal breakfast before getting sick was egg whites. Not keeping sweets in house. Eating well per pt - will grab a yogurt if craving something sweet.     Weight was up to 230 lbs, noticed an increase in cravings. Started Naltrexone. Now down to 216 (5 lb loss since being sick).     Lab appt scheduled for 7/18. Stopped using nicotine patches right after seeing Rashmi 4/27/22    Recently got an exercise bike from a friend.    July 2022:  Going on vacation for 3 weeks. Leaving later today. Heading to [place]     Continues with goals  ? Lots of vegetables  ? Leaner protein  sources (turkey as opposed to beef)  ? Smaller portions  ? No alcohol   ? No smoking (is exposed to second hand smoke from friends/dad)  ?  fluids from meal time  ? Staying well hydrated   ? No pop  ? No juices     Tried milk and bread - did not sit well per pt.     Recent recall:  ? Mixed vegetables  ? 1/2 turkey burger (no bun)  ? Brussel sprouts   ? Yogurt   ? Orange   ? Water    Going to start working with Mercedes Whitfield (first appt 9/29/22)    Plans to look for a MVI with iron this month. Re-reviewed needs.    Pt reports mental health is good - only taking clonazepam 1x/day (use to be 2x). No depression, some anxiety but mostly due to excitement for surgery.     PA: lots of walking, got a foot exercise thing as well  Bought some small dumbells for after surgery.    August 2022:  Lots of stress this past month. Higher cravings, hard to control. Also noticing lots of anxiety - not sure about surgery but wants to continue with process.    Had 2 cans of pop and a couple cookies- feels self slipping back into old dietary habits.     Going for walks to help. Plans to get a gym membership.    Has continued to separate fluids (up to 10 minutes post meals currently) and work on other dietary goals (portion control). If wanting a snack tries to have cottage cheese or yogurt.    B - 1/2 packet of oatmeal   L - deli meat with raw vegetable on side  D - varies    Also having lots of issues with hernia - pain and getting sick. Hernia is getting bigger per pt. Hard to keep food down. Lots of reflux. Taking meds but not helping per pt. Seeing PCP 8/17/22.       Recall Diet Questions Reviewed With Patient 1/19/2022   Describe what you typically consume for breakfast (typical or most recent): I usually don't eat much for breakfast maybe a piece of toast.   Describe what you typically consume for lunch (typical or most recent): Sometimes hot dogs or macaroni and cheese or hamburgers or fast food or eggs or just about  anything   Describe what you typically consume for supper (typical or most recent): Always have to have meat with my dinner potato vegetable if it's corn or green beans but I prefer peas and then I snack at night time before bed because I'm so hungry   Describe what you typically consume as snacks (typical or most recent): Cereal bars chips sometimes fruit a hamburger fast food   How many ounces of water, or other low calorie drinks, do you drink daily (8 oz=1 glass)? 48 oz   How many ounces of caffeine (coffee, tea, pop) do you drink daily (8 oz=1 glass)? 8 oz   How many ounces of carbonated (pop, beer, sparkling water) drinks do you drinky daily (8 oz=1 glass)? 8 oz   How many ounces of juice, pop, sweet tea, sports drinks, protein drinks, other sweetened drinks, do you drink daily (8 oz=1 glass)? 24 oz   How many ounces of milk do you drink daily (8 oz=1 glass) 8 oz   Please indicate the type of milk: 2%   How often do you drink alcohol? Never   If you do drink alcohol, how many drinks might you have in a day? (one drink = 5 oz. wine, 1 can/bottle of beer, 1 shot liquor) -       Eating Habits 1/19/2022   Do you have any dietary restrictions? Yes   Do you currently binge eat (eat a large amount of food in a short time)? -   Are you an emotional eater? No   Do you get up to eat after falling asleep? Yes   What foods do you crave? I crave Pizza macaroni and cheese [employer] hamburger french fries but I do still eat my vegetables     Dining Out History Reviewed With Patient 1/19/2022   How often do you dine out? A couple of times a week.   Where do you dine out? (select all that apply) fast food chains, take out   What types of food do you order when you dine out? Some type of meat potato and of course you got to have a green beans or corn       Physical Activity Reviewed With Patient 2/22/2022   How often do you exercise? Daily   What is the duration of your exercise (in minutes)? 30 Minutes   What types of exercise do  you do? walking, other   What keeps you from being more active?  I am as active as I can possbily be       NUTRITION DIAGNOSIS:  Obesity r/t long history of positive energy balance aeb BMI >30 kg/m2.    INTERVENTION:  Intervention Provided/Education Provided on post-op diet guidelines, vitamins/minerals essential post-operatively, GI anatomy of bariatric surgeries, ways to help prepare for post-op diet guidelines pre-operatively, portion/calorie-control, mindful eating and sources of protein.  Patient demonstrates understanding. Provided pt with list of goals RD contact information.      Questions Reviewed With Patient 1/19/2022   How ready are you to make changes regarding your weight? Number 1 = Not ready at all to make changes up to 10 = very ready. 10   How confident are you that you can change? 1 = Not confident that you will be successful making changes up to 10 = very confident. 10       Expected Engagement: good    GOALS:  1. Keep up the great work!    - No alcohol   - No smoking   - Seperating fluids from meal time    - Sipping fluids   - Chewing/pace   - No carbonation after surgery    2. Will need 60 grams of protein/day minimum after surgery    Protein Sources   [email or web address]       High Fiber Foods:  Bran cereal, 1/3 cup, 8.6 gm fiber   Cooked kidney beans   cup 7.9 gm  Cooked lentils   cup 7.8 gm  Cooked black beans   cup 7.6 gm  Canned chickpeas   cup 5.3 gm  Baked beans   cup 5.2 gm  Pear 1 5.1 gm  Soybeans   cup 5.1 gm  Quinoa   cup 5 gm  Baked sweet potato, with skin 1 medium 4.8 gm  Baked potato, with skin 1 medium 4.4 gm  Cooked frozen green peas   cup 4.4 gm  Bulgur   cup 4.1 gm  Cooked frozen mixed vegetables   cup 4 gm  Raspberries   cup 4 gm  Blackberries   cup 3.8 gm  Almonds 1 oz 3.5 gm  Cooked frozen spinach   cup 3.5 gm  Vegetable or soy yony 1 each 3.4 gm  Apple 1 medium 3.3 gm  Dried dates 5 pieces 3.3 gm    Surgery resources:  Diet Guidelines after Weight Loss  Surgery  [email or web address]     Post-op Diet Advancement Schedule:  Clear Liquid Diet (stage 1):  TBD   *No RED, PROTEIN or PULP day before surgery  Low-Fat Full Liquid Diet (stage 2): TBD (2 weeks total between clear/liquid)  Pureed Diet (stage 3): TBD (2 weeks)  Soft Diet (stage 4): TBD (4 weeks)  Regular Diet (stage 5): TBD     Diabetes Plate Method:  https://www.diabetesfoodhub.org/articles/what-is-the-diabetes-plate-method.html    Your Stage 1 Diet: Clear Liquids  [email or web address]     Your Stage 2 Diet: Low-fat Full Liquids  [email or web address]     Your Stage 3 Diet: Pureed Foods  [email or web address]     Pureed Recipes  [email or web address]    Your Stage 4 Diet: Soft Foods  [email or web address]    Your Stage 5 Diet: Regular Foods  [email or web address]    Follow up:   Thursday, September 8th at 1:00 pm    Time spent with patient: 28 minutes.  SHELLY Neal, RD, LD

## 2022-08-09 NOTE — PROGRESS NOTES
"Swetha Patterson is a 52 year old female who is being evaluated via a billable telephone visit.     The patient has been notified of following:     \"This telephone visit will be conducted via a call between you and your physician/provider. We have found that certain health care needs can be provided without the need for a physical exam.  This service lets us provide the care you need with a short phone conversation.  If a prescription is necessary we can send it directly to your pharmacy.  If lab work is needed we can place an order for that and you can then stop by our lab to have the test done at a later time.    Telephone visits are billed at different rates depending on your insurance coverage. During this emergency period, for some insurers they may be billed the same as an in-person visit.  Please reach out to your insurance provider with any questions.    If during the course of the call the physician/provider feels a telephone visit is not appropriate, you will not be charged for this service.\"    Patient has given verbal consent for Telephone visit?  Yes    Phone call duration: 28 minutes    During this virtual visit the patient is located in [place], patient verifies this as the location during the entirety of this visit.       Bariatric Nutrition Consultation Note    Reason For Visit: Nutrition Assessment    Swetha Patterson is a 51 year old presenting today for return bariatric nutrition consult.   Pt is interested in laparoscopic sleeve gastrectomy.    Pt has met the minimum required RD appointments but does need formal post op education once she has a surgery date.     Pt referred by NOE Blake on January 20, 2022.    Coordination note:   Needs Psych eval  - On file 7/6/22 from Dr. Hartman  Needs letter of support from therapist and psychiatrist   Needs clearance from sleep medicine - In progress, had to reschedule sleep study due to being sick  Needs PCP letter of support - Done per pt.   Surgeon meet " "and terrell with Dr. Leahc - Done 6/9/22   No nicotine for 3 months prior to surgery - Working on  Sobriety from alcohol for 1 year - May 2022 - Met    Needs baseline labs - Done 1/24/22  10 lb weight loss from initial - Met    Patient with Co-morbidities of obesity including:  Type II DM no but does have pre-diabetes, last A1C 5.8 on 1/24/22  Renal Failure no  Sleep apnea yes  Hypertension yes  Dyslipidemia yes  Joint pain no  Back pain no  GERD no     PMH: fatty liver (nonalcoholic per pt), fibromyalgia, cholecystomy, asthma    Support System Reviewed With Patient 1/19/2022   Who do you have in your support network that can be available to help you for the first 2 weeks after surgery? I have my sister Rukhsana a very close friend of keri Mckinley and I have my dad and I also have psychiatrist my sister Rukhsana Kearns my dad and my psychiatrist again Rukhsana Milian   Who can you count on for support throughout your weight loss surgery journey? Rukhsana Kearns my dad my psychiatrist       ANTHROPOMETRICS:  Consult weight 1/20/22: 247 lbs with BMI 43.75    Estimated body mass index is 38.97 kg/m  as calculated from the following:    Height as of 6/9/22: 1.6 m (5' 3\").    Weight as of this encounter: 99.8 kg (220 lb).     Current weight: going between 215-224 per pt    Required weight loss goal pre-op: 10 lbs from initial consult weight (goal weight 237 lbs or less before surgery)       1/19/2022   I have tried the following methods to lose weight Watching portions or calories, Exercise, Physician directed program       Weight Loss Questions Reviewed With Patient 1/19/2022   How long have you been overweight? From Middle age and beyond     MEDICATIONS FOR WEIGHT LOSS:  Semaglutide  Naltrexone      Also takes Wellbutrin    SUPPLEMENT INFORMATION:  Vit D 5000 IUS/ MWF  One daily Womens MVI - not chewable    Vit D WNL 1/24/22, hx of vit d def    Prefers oral B12 after surgery - does not like " needles    NUTRITION HISTORY:  NKFA  Possible lactose intolerance     Pt s friend had sleeve surgery - is familiar with some of it through her. Went well for friend.    Nicotine: Smoked for ~40+ years per pt. Quit cigarettes two weeks ago, using patch and gum. Refer to MTM. None for 3 months prior to surgery    Alcohol: hx of abuse. Sober since May 2021. No surgery until May 2022    Please see previous RD note for more detail.    March 2022:    Discussed task list for surgery.     Doing well with dietary goals  ? Chewing food well (counting to 15-20)  ? Smaller portions   ? Continues to be alcohol free  ? Continues to drink slower   ?  fluids from meal time for most part (might have a sip here/there) - at about 20 minutes with none right now    Protein sources: pb, yogurt, cheese, chicken and turkey     Recent recall:  - Banana  - Mixed fruit  - Cheese   - 2 Tbsp pb on celery   - Vegetable Soup   - 1 slice of pepperoni pizza     PA: walking lots    April 2022:    Pt shared she has not had a BM since Friday. Thinks it is related to the Rybelsus (Semglatuide) she recently started. She is hoping to stop the medication. Scared to eat right now due to being backed up.     Didn t eat anything yesterday - today only had two pickles thus far.      Continues to do well with dietary goals and weight loss. However, pt is struggling with changes in her body and  saggy skin . Pt concerned about how she will look going forward. She is not sure how to proceed right now - hesitant but also not wanting to give up.    Has found that if she overeats she will vomit - stomach is helping her know when to stop.     May 2022:  Was struggling with night eating - better now. Increased dose of medication and none since.    Continues to do well with goals -  fluids during meal time, chewing well, setting utensil down, making good food choices.    Has practiced eating on smaller plates (saucer size)    Doing well resisting  temptations.     Fluids: water, 1 coke zero/day, V8 juice occ, sugar-free flavorings in water    BM - was constipated but made some changes (more fiber, etc)     Nicotine: none - was using patches but stopped     PA: walking or moving around house    June 2022:    Really sick - bed for 3 days. Throws up anytime she tries to get up. Can t hold any food down - trying to stay hydrated. Lost 5 lbs. At home nurse came yesterday - said temperature was only 85 degrees. Nurse checked 3x - both temples and wrists. Then went up to 95. Temp today is 99.1. Checking temp about every 4 hrs. Checked for covid - negative.     Ate some toast today and munching on crackers. Plans to try ramen noodles next. Also doing popsicles. Also doing some electrolyte water. Recommended she try some Ensure.     ? Cut out all carbonated beverages.  ? Continues with smaller portions.   ? Bought some protein shakes for after surgery (Ensure Max protein).   ? No alcohol.  ? No nicotine  ?  fluids from meal and none for up to 20 min after (working towards 30 min)    Normal breakfast before getting sick was egg whites. Not keeping sweets in house. Eating well per pt - will grab a yogurt if craving something sweet.     Weight was up to 230 lbs, noticed an increase in cravings. Started Naltrexone. Now down to 216 (5 lb loss since being sick).     Lab appt scheduled for 7/18. Stopped using nicotine patches right after seeing Rashmi 4/27/22    Recently got an exercise bike from a friend.    July 2022:  Going on vacation for 3 weeks. Leaving later today. Heading to [place]     Continues with goals  ? Lots of vegetables  ? Leaner protein sources (turkey as opposed to beef)  ? Smaller portions  ? No alcohol   ? No smoking (is exposed to second hand smoke from friends/dad)  ?  fluids from meal time  ? Staying well hydrated   ? No pop  ? No juices     Tried milk and bread - did not sit well per pt.     Recent recall:  ? Mixed  vegetables  ? 1/2 turkey burger (no bun)  ? Brussel sprouts   ? Yogurt   ? Orange   ? Water    Going to start working with Mercedes Whitfield (first appt 9/29/22)    Plans to look for a MVI with iron this month. Re-reviewed needs.    Pt reports mental health is good - only taking clonazepam 1x/day (use to be 2x). No depression, some anxiety but mostly due to excitement for surgery.     PA: lots of walking, got a foot exercise thing as well  Bought some small dumbells for after surgery.    August 2022:  Lots of stress this past month. Higher cravings, hard to control. Also noticing lots of anxiety - not sure about surgery but wants to continue with process.    Had 2 cans of pop and a couple cookies- feels self slipping back into old dietary habits.     Going for walks to help. Plans to get a gym membership.    Has continued to separate fluids (up to 10 minutes post meals currently) and work on other dietary goals (portion control). If wanting a snack tries to have cottage cheese or yogurt.    B - 1/2 packet of oatmeal   L - deli meat with raw vegetable on side  D - varies    Also having lots of issues with hernia - pain and getting sick. Hernia is getting bigger per pt. Hard to keep food down. Lots of reflux. Taking meds but not helping per pt. Seeing PCP 8/17/22.       Recall Diet Questions Reviewed With Patient 1/19/2022   Describe what you typically consume for breakfast (typical or most recent): I usually don't eat much for breakfast maybe a piece of toast.   Describe what you typically consume for lunch (typical or most recent): Sometimes hot dogs or macaroni and cheese or hamburgers or fast food or eggs or just about anything   Describe what you typically consume for supper (typical or most recent): Always have to have meat with my dinner potato vegetable if it's corn or green beans but I prefer peas and then I snack at night time before bed because I'm so hungry   Describe what you typically consume as snacks  (typical or most recent): Cereal bars chips sometimes fruit a hamburger fast food   How many ounces of water, or other low calorie drinks, do you drink daily (8 oz=1 glass)? 48 oz   How many ounces of caffeine (coffee, tea, pop) do you drink daily (8 oz=1 glass)? 8 oz   How many ounces of carbonated (pop, beer, sparkling water) drinks do you drinky daily (8 oz=1 glass)? 8 oz   How many ounces of juice, pop, sweet tea, sports drinks, protein drinks, other sweetened drinks, do you drink daily (8 oz=1 glass)? 24 oz   How many ounces of milk do you drink daily (8 oz=1 glass) 8 oz   Please indicate the type of milk: 2%   How often do you drink alcohol? Never   If you do drink alcohol, how many drinks might you have in a day? (one drink = 5 oz. wine, 1 can/bottle of beer, 1 shot liquor) -       Eating Habits 1/19/2022   Do you have any dietary restrictions? Yes   Do you currently binge eat (eat a large amount of food in a short time)? -   Are you an emotional eater? No   Do you get up to eat after falling asleep? Yes   What foods do you crave? I crave Pizza macaroni and cheese [employer] hamburger french fries but I do still eat my vegetables     Dining Out History Reviewed With Patient 1/19/2022   How often do you dine out? A couple of times a week.   Where do you dine out? (select all that apply) fast food chains, take out   What types of food do you order when you dine out? Some type of meat potato and of course you got to have a green beans or corn       Physical Activity Reviewed With Patient 2/22/2022   How often do you exercise? Daily   What is the duration of your exercise (in minutes)? 30 Minutes   What types of exercise do you do? walking, other   What keeps you from being more active?  I am as active as I can possbily be       NUTRITION DIAGNOSIS:  Obesity r/t long history of positive energy balance aeb BMI >30 kg/m2.    INTERVENTION:  Intervention Provided/Education Provided on post-op diet guidelines,  vitamins/minerals essential post-operatively, GI anatomy of bariatric surgeries, ways to help prepare for post-op diet guidelines pre-operatively, portion/calorie-control, mindful eating and sources of protein.  Patient demonstrates understanding. Provided pt with list of goals RD contact information.      Questions Reviewed With Patient 1/19/2022   How ready are you to make changes regarding your weight? Number 1 = Not ready at all to make changes up to 10 = very ready. 10   How confident are you that you can change? 1 = Not confident that you will be successful making changes up to 10 = very confident. 10       Expected Engagement: good    GOALS:  1. Keep up the great work!    - No alcohol   - No smoking   - Seperating fluids from meal time    - Sipping fluids   - Chewing/pace   - No carbonation after surgery    2. Will need 60 grams of protein/day minimum after surgery    Protein Sources   [email or web address]       High Fiber Foods:  Bran cereal, 1/3 cup, 8.6 gm fiber   Cooked kidney beans   cup 7.9 gm  Cooked lentils   cup 7.8 gm  Cooked black beans   cup 7.6 gm  Canned chickpeas   cup 5.3 gm  Baked beans   cup 5.2 gm  Pear 1 5.1 gm  Soybeans   cup 5.1 gm  Quinoa   cup 5 gm  Baked sweet potato, with skin 1 medium 4.8 gm  Baked potato, with skin 1 medium 4.4 gm  Cooked frozen green peas   cup 4.4 gm  Bulgur   cup 4.1 gm  Cooked frozen mixed vegetables   cup 4 gm  Raspberries   cup 4 gm  Blackberries   cup 3.8 gm  Almonds 1 oz 3.5 gm  Cooked frozen spinach   cup 3.5 gm  Vegetable or soy yony 1 each 3.4 gm  Apple 1 medium 3.3 gm  Dried dates 5 pieces 3.3 gm    Surgery resources:  Diet Guidelines after Weight Loss Surgery  [email or web address]     Post-op Diet Advancement Schedule:  Clear Liquid Diet (stage 1):  TBD   *No RED, PROTEIN or PULP day before surgery  Low-Fat Full Liquid Diet (stage 2): TBD (2 weeks total between clear/liquid)  Pureed Diet (stage 3): TBD (2 weeks)  Soft Diet (stage 4):  TBD (4 weeks)  Regular Diet (stage 5): TBD     Diabetes Plate Method:  https://www.diabetesfoodhub.org/articles/what-is-the-diabetes-plate-method.html    Your Stage 1 Diet: Clear Liquids  [email or web address]     Your Stage 2 Diet: Low-fat Full Liquids  [email or web address]     Your Stage 3 Diet: Pureed Foods  [email or web address]     Pureed Recipes  [email or web address]    Your Stage 4 Diet: Soft Foods  [email or web address]    Your Stage 5 Diet: Regular Foods  [email or web address]    Follow up:   Thursday, September 8th at 1:00 pm    Time spent with patient: 28 minutes.  SHELLY Neal, RD, LD

## 2022-08-10 ENCOUNTER — TELEPHONE (OUTPATIENT)
Dept: SLEEP MEDICINE | Facility: CLINIC | Age: 52
End: 2022-08-10

## 2022-08-10 NOTE — TELEPHONE ENCOUNTER
Reason for Call:  Other appointment    Detailed comments: needs follow up appointment to be in November due to surgery for psg results    Phone Number Patient can be reached at: Home number on file [phone] (home)    Best Time: anytime    Can we leave a detailed message on this number? YES    Call taken on 8/10/2022 at 4:06 PM by Ann Marie Raphael

## 2022-08-11 NOTE — TELEPHONE ENCOUNTER
Attempted to call patient on telephone 2x and was not able to reach patient or leave a message. Sent a My Chart message:  Ramone Smith,    We received your message that you needed to schedule your follow-up appointment with Dr. Whiting in November so it is completed prior to an upcoming surgery.  There are no VIRTUAL or VIDEO follow-up appointments with Dr. Whiting prior to December 2022. We were able to schedule you a follow-up appointment with Dr. Whiting on Thursday, November 10th, 2022 at 11:30 AM, In-Person, in [place] if you can get there.  If that doesn't work call back and we can get you set up for the first available VIRTUAL or VIDEO visit in December 2022. Thank you and have a great day!    [facility]  Shameka Elias CMA

## 2022-08-12 DIAGNOSIS — F41.9 ANXIETY: ICD-10-CM

## 2022-08-15 RX ORDER — HYDROXYZINE HYDROCHLORIDE 50 MG/1
TABLET, FILM COATED ORAL
Qty: 70 TABLET | Refills: 0 | Status: SHIPPED | OUTPATIENT
Start: 2022-08-15 | End: 2022-09-06

## 2022-08-15 NOTE — TELEPHONE ENCOUNTER
Hydroxyzine Hcl 50 mg tab  Passes Protocol    Refilled 08/02/22    Next 5 appointments (look out 90 days)      Aug 17, 2022  9:00 AM  (Arrive by 8:40 AM)  Provider Visit with Roro Chawla MD  [facility] [address] [facility] [address]  [phone]     Aug 31, 2022  8:00 AM  (Arrive by 7:40 AM)  Adult Preventative Visit with Roro Chawla MD  [facility] ) [address] [facility] [address]  [phone]

## 2022-08-17 ENCOUNTER — OFFICE VISIT (OUTPATIENT)
Dept: INTERNAL MEDICINE | Facility: CLINIC | Age: 52
End: 2022-08-17
Payer: COMMERCIAL

## 2022-08-17 ENCOUNTER — TELEPHONE (OUTPATIENT)
Dept: PULMONOLOGY | Facility: CLINIC | Age: 52
End: 2022-08-17

## 2022-08-17 VITALS
HEART RATE: 82 BPM | SYSTOLIC BLOOD PRESSURE: 136 MMHG | DIASTOLIC BLOOD PRESSURE: 80 MMHG | TEMPERATURE: 97.5 F | WEIGHT: 224 LBS | BODY MASS INDEX: 39.69 KG/M2 | OXYGEN SATURATION: 97 % | RESPIRATION RATE: 16 BRPM | HEIGHT: 63 IN

## 2022-08-17 DIAGNOSIS — J44.9 SEVERE CHRONIC OBSTRUCTIVE PULMONARY DISEASE (H): Chronic | ICD-10-CM

## 2022-08-17 DIAGNOSIS — K44.9 HIATAL HERNIA: ICD-10-CM

## 2022-08-17 DIAGNOSIS — E66.01 CLASS 3 SEVERE OBESITY DUE TO EXCESS CALORIES WITHOUT SERIOUS COMORBIDITY WITH BODY MASS INDEX (BMI) OF 50.0 TO 59.9 IN ADULT (H): ICD-10-CM

## 2022-08-17 DIAGNOSIS — R10.13 EPIGASTRIC PAIN: ICD-10-CM

## 2022-08-17 DIAGNOSIS — I10 HTN, GOAL BELOW 140/90: ICD-10-CM

## 2022-08-17 DIAGNOSIS — E11.9 TYPE 2 DIABETES MELLITUS WITHOUT COMPLICATION, WITHOUT LONG-TERM CURRENT USE OF INSULIN (H): Primary | ICD-10-CM

## 2022-08-17 DIAGNOSIS — E66.813 CLASS 3 SEVERE OBESITY DUE TO EXCESS CALORIES WITHOUT SERIOUS COMORBIDITY WITH BODY MASS INDEX (BMI) OF 50.0 TO 59.9 IN ADULT (H): ICD-10-CM

## 2022-08-17 LAB
ERYTHROCYTE [DISTWIDTH] IN BLOOD BY AUTOMATED COUNT: 14.9 % (ref 10–15)
HBA1C MFR BLD: 5.5 % (ref 0–5.6)
HCT VFR BLD AUTO: 40 % (ref 35–47)
HGB BLD-MCNC: 13.1 G/DL (ref 11.7–15.7)
MCH RBC QN AUTO: 26.7 PG (ref 26.5–33)
MCHC RBC AUTO-ENTMCNC: 32.8 G/DL (ref 31.5–36.5)
MCV RBC AUTO: 82 FL (ref 78–100)
PLATELET # BLD AUTO: 339 10E3/UL (ref 150–450)
RBC # BLD AUTO: 4.9 10E6/UL (ref 3.8–5.2)
WBC # BLD AUTO: 8.2 10E3/UL (ref 4–11)

## 2022-08-17 PROCEDURE — 82043 UR ALBUMIN QUANTITATIVE: CPT | Performed by: INTERNAL MEDICINE

## 2022-08-17 PROCEDURE — 84443 ASSAY THYROID STIM HORMONE: CPT | Performed by: INTERNAL MEDICINE

## 2022-08-17 PROCEDURE — 99214 OFFICE O/P EST MOD 30 MIN: CPT | Performed by: INTERNAL MEDICINE

## 2022-08-17 PROCEDURE — 83721 ASSAY OF BLOOD LIPOPROTEIN: CPT | Mod: 59 | Performed by: INTERNAL MEDICINE

## 2022-08-17 PROCEDURE — 36415 COLL VENOUS BLD VENIPUNCTURE: CPT | Performed by: INTERNAL MEDICINE

## 2022-08-17 PROCEDURE — 80053 COMPREHEN METABOLIC PANEL: CPT | Performed by: INTERNAL MEDICINE

## 2022-08-17 PROCEDURE — 84439 ASSAY OF FREE THYROXINE: CPT | Performed by: INTERNAL MEDICINE

## 2022-08-17 PROCEDURE — 85027 COMPLETE CBC AUTOMATED: CPT | Performed by: INTERNAL MEDICINE

## 2022-08-17 PROCEDURE — 82550 ASSAY OF CK (CPK): CPT | Performed by: INTERNAL MEDICINE

## 2022-08-17 PROCEDURE — 80061 LIPID PANEL: CPT | Performed by: INTERNAL MEDICINE

## 2022-08-17 PROCEDURE — 83036 HEMOGLOBIN GLYCOSYLATED A1C: CPT | Performed by: INTERNAL MEDICINE

## 2022-08-17 RX ORDER — SUCRALFATE 1 G/1
1 TABLET ORAL 4 TIMES DAILY PRN
Qty: 120 TABLET | Refills: 1 | Status: SHIPPED | OUTPATIENT
Start: 2022-08-17 | End: 2022-11-11

## 2022-08-17 RX ORDER — OMEPRAZOLE 40 MG/1
CAPSULE, DELAYED RELEASE ORAL
Qty: 60 CAPSULE | Refills: 3 | Status: SHIPPED | OUTPATIENT
Start: 2022-08-17 | End: 2023-01-27

## 2022-08-17 ASSESSMENT — PATIENT HEALTH QUESTIONNAIRE - PHQ9
SUM OF ALL RESPONSES TO PHQ QUESTIONS 1-9: 5
SUM OF ALL RESPONSES TO PHQ QUESTIONS 1-9: 5
10. IF YOU CHECKED OFF ANY PROBLEMS, HOW DIFFICULT HAVE THESE PROBLEMS MADE IT FOR YOU TO DO YOUR WORK, TAKE CARE OF THINGS AT HOME, OR GET ALONG WITH OTHER PEOPLE: SOMEWHAT DIFFICULT

## 2022-08-17 NOTE — TELEPHONE ENCOUNTER
Patient called me and left me a voicemail message regarding scheduling. I called patient back but the call did not go through to the patient's phone.

## 2022-08-17 NOTE — TELEPHONE ENCOUNTER
Patient called me back immediately and scheduled a follow up appointment with Dr. Brody on 10/24/22. Patient was offered sooner appointments in August, September, and October but patient declined these appointments. Patient wanted a mid-morning appointment. Patient agreeable to appointment on 10/24/22 with Dr. Brody.

## 2022-08-17 NOTE — PROGRESS NOTES
Dr Low's note      Patient's instructions / PLAN:                                                        Plan:  1. Sucralfate 4 times a day as needed  2. Omeprazole 40 mg twice a day for a month then once a day  3. Semaglutide 14 mg daily       ASSESSMENT & PLAN:                                                        (E11.9) Type 2 diabetes mellitus without complication, without long-term current use of insulin (H)  (primary encounter diagnosis)  Comment: better BS since semaglutide  Plan: Semaglutide 14 MG TABS, CK total, Hemoglobin         A1c, CBC with platelets, Comprehensive         metabolic panel, Lipid panel reflex to direct         LDL Non-fasting, Albumin Random Urine         Quantitative with Creat Ratio, TSH with free T4        reflex            (R10.13) Epigastric pain  (K44.9) Hiatal hernia  Comment:   Plan: omeprazole (PRILOSEC) 40 MG DR capsule, CK         total, Hemoglobin A1c, CBC with platelets,         Comprehensive metabolic panel, Lipid panel         reflex to direct LDL Non-fasting, Albumin         Random Urine Quantitative with Creat Ratio, TSH        with free T4 reflex              (E66.01,  Z68.43) Class 3 severe obesity due to excess calories without serious comorbidity with body mass index (BMI) of 50.0 to 59.9 in adult (H)  Comment: loosing weigh w Semaglutide   Plan: CK total, Hemoglobin A1c, CBC with platelets,         Comprehensive metabolic panel, Lipid panel         reflex to direct LDL Non-fasting, Albumin         Random Urine Quantitative with Creat Ratio, TSH        with free T4 reflex            (K44.9) Hiatal hernia  Comment:   Plan: omeprazole (PRILOSEC) 40 MG DR capsule, CK         total, Hemoglobin A1c, CBC with platelets,         Comprehensive metabolic panel, Lipid panel         reflex to direct LDL Non-fasting, Albumin         Random Urine Quantitative with Creat Ratio, TSH        with free T4 reflex            (J44.9) Chr Lung Disease - managed by the pulm dept  (HCC)^^^  Comment: better since she quit smoking   Plan:     (I10) HTN, goal below 140/90  Comment: Controlled    Plan: Continue same meds, same doses for now        Chief complaint:                                                      Follow up chronic medical problems   epig pain     SUBJECTIVE:                                                    History of present illness:    DM  -- tolerates Semaglutide 7 mg  -- lost about 20 lbs       Swetha is a 52 year old, presenting for the following health issues:  Patient is being seen to discuss bariatric surgery and hernia.    History of Present Illness       Reason for visit:  A hiatal hernia  Symptom onset:  More than a month  Symptoms include:  If I do certain things around the house like bend over scrub the floor or sit down I get nausea and the sweats and my stomach starts to hurt and then throw up it's getting worse  Symptom intensity:  Severe  Symptom progression:  Worsening  Had these symptoms before:  Yes  Has tried/received treatment for these symptoms:  Yes  Previous treatment was successful:  No  What makes it worse:  Pain in my stomach and I also get nauseated and I also throw up to the point where I have to lay down and usually happens it starts to happen during midday  What makes it better:  No unless I stay in bed all day and don't move around then it doesn't hurt but I don't stay in bed all day I got to move around    She eats 0-1 servings of fruits and vegetables daily.She consumes 0 sweetened beverage(s) daily.She exercises with enough effort to increase her heart rate 20 to 29 minutes per day.  She exercises with enough effort to increase her heart rate 5 days per week.   She is taking medications regularly.    Today's PHQ-9         PHQ-9 Total Score: 5    PHQ-9 Q9 Thoughts of better off dead/self-harm past 2 weeks :   Not at all    How difficult have these problems made it for you to do your work, take care of things at home, or get along with  "other people: Somewhat difficult         Review of Systems:                                                      ROS: negative for fever, chills, cough, wheezes, chest pain, shortness of breath, vomiting, abdominal pain, leg swelling       OBJECTIVE:             Physical exam:   Blood pressure 136/80, pulse 82, temperature 97.5  F (36.4  C), temperature source Oral, resp. rate 16, height 1.6 m (5' 3\"), weight 101.6 kg (224 lb), last menstrual period 08/17/2013, SpO2 97 %, not currently breastfeeding.     NAD, appears comfortable  Skin: no rashes   Neck: supple, no JVD,  No thyroidmegaly. Lymph nodes nonpalpable cervical and supraclavicular.  Chest: clear to auscultation bilaterally, good respiratory effort  Heart: S1 S2, RRR, no mgr appreciated  Abdomen: soft, mild epig tender,   Extremities: no edema,  Neurologic: A, Ox3, no focal signs appreciated    PMHx: reviewed  Past Medical History:   Diagnosis Date     Anxiety state, unspecified      Asthma      Chronic pain     back pain from cyst     Contact dermatitis and other eczema, due to unspecified cause      COPD (chronic obstructive pulmonary disease) (H)      Depressive disorder, not elsewhere classified      Diabetes (H)      Emphysema with chronic bronchitis (H)      Esophageal reflux      Family history of ischemic heart disease      Fibromyalgia      Gastro-oesophageal reflux disease      Heart disease     has chest pain sometimes     History of emphysema      Hoarseness      HTN, goal below 140/90      Hyperlipidemia LDL goal <130      Liver disease     \"fatty liver\"     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)      PONV (postoperative nausea and vomiting)      Rectal bleeding       PSHx: reviewed  Past Surgical History:   Procedure Laterality Date     ANESTHESIA OUT OF OR MRI N/A 10/7/2021    Procedure: ANESTHESIA OUT OF OR MRI;  Surgeon: GENERIC ANESTHESIA PROVIDER;  Location: [facility]     ARTHROSCOPY SHOULDER DECOMPRESSION Left 10/21/2015    Procedure: " ARTHROSCOPY SHOULDER DECOMPRESSION;  Surgeon: Julien Milian MD;  Location: [facility]     BIOPSY ARTERY TEMPORAL Left 3/11/2020    Procedure: LEFT TEMPORAL ARTERY BIOPSY;  Surgeon: Ibeth Conner MD;  Location: [facility]     BRONCHIAL THERMOPLASTY N/A 11/14/2014    Procedure: BRONCHIAL THERMOPLASTY;  Surgeon: Ward Whitaker MD;  [facility]     BRONCHIAL THERMOPLASTY N/A 12/19/2014    Procedure: BRONCHIAL THERMOPLASTY;  Surgeon: Ward Whitaker MD;  [facility]     BRONCHIAL THERMOPLASTY N/A 2/6/2015    Procedure: BRONCHIAL THERMOPLASTY;  Surgeon: Ward Whitaker MD;  [facility]     COLONOSCOPY N/A 11/26/2018    Procedure: COLONOSCOPY ([facility]);  Surgeon: Marshall Oakes MD;  Location: [facility]     COLONOSCOPY N/A 10/22/2020    Procedure: Colonoscopy;  Surgeon: Marshall Mccormick MD;  Location: [facility]     DISCECTOMY, FUSION CERVICAL ANTERIOR ONE LEVEL, COMBINED N/A 5/1/2018    Procedure: COMBINED DISCECTOMY, FUSION CERVICAL ANTERIOR ONE LEVEL;  1.  C5-C6 anterior cervical diskectomy and fusion.    2.  C5-C6 application of intervertebral biomechanical device for interbody fusion purposes.    3.  C5-C6 anterior instrumentation using the standard Kristin InViZia 24 mm plate with four associated bone screws, 12 mm in length.  Inferior screws are fixed.  Superior screws are va     ENT SURGERY  2015    polyps removed from vocal cords      ESOPHAGOSCOPY, GASTROSCOPY, DUODENOSCOPY (EGD), COMBINED N/A 10/22/2020    Procedure: Esophagoscopy, gastroscopy, duodenoscopy with biopsies;  Surgeon: Marshall Mccormick MD;  Location: [facility]     ESOPHAGOSCOPY, GASTROSCOPY, DUODENOSCOPY (EGD), COMBINED N/A 6/17/2021    Procedure: ESOPHAGOGASTRODUODENOSCOPY, WITH BIOPSY;  Surgeon: Darrel Damon MD;  Location: [facility]     EXCISE MASS TRUNK Left 11/3/2021    Procedure: EXCISION LEFT SHOULDER LIPOMA;  Surgeon: Ibeth Conner MD;  Location: [facility]     EXCISE NODE MEDIASTINAL  4/26/2013    Procedure:  EXCISE NODE MEDIASTINAL;;  Surgeon: Av Peña MD;  Location: [facility]     LAPAROSCOPIC CHOLECYSTECTOMY N/A 10/19/2017    Procedure: LAPAROSCOPIC CHOLECYSTECTOMY;  LAPAROSCOPIC CHOLECYSTECTOMY;  Surgeon: Ney Jerry MD;  Location:  OR     THORACOSCOPY  4/26/2013    Procedure: THORACOSCOPY;  LEFT VIDEO ASSISTED THORACOSCOPY, RESECTION OF POSTERIOR MEDIASTINAL MASS;  Surgeon: Av Peña MD;  Location: [facility]     TONSILLECTOMY  as a kid     TONSILLECTOMY       ZZC APPENDECTOMY  at age 17        Meds: reviewed  Current Outpatient Medications   Medication Sig Dispense Refill     albuterol (PROVENTIL) (2.5 MG/3ML) 0.083% neb solution INHALE ONE VIAL BY NEBULIZER EVERY 6 HOURS AS NEEDED FOR SHORTNESS OF BREATH OR WHEEZING 75 mL 3     albuterol (VENTOLIN HFA) 108 (90 Base) MCG/ACT inhaler Inhale 2 puffs into the lungs every 6 hours 3 each 3     amLODIPine (NORVASC) 2.5 MG tablet Take 2 tablets (5 mg) by mouth daily 180 tablet 2     atorvastatin (LIPITOR) 80 MG tablet TAKE ONE TABLET BY MOUTH EVERY DAY 90 tablet 3     benzoyl peroxide (ACNE-CLEAR) 10 % external gel Apply topically 2 times daily as needed 90 g 1     blood glucose (ACCU-CHEK ALLYSON PLUS) test strip USE TO TEST BLOOD SUGAR ONCE DAILY OR AS DIRECTED 100 strip 3     blood glucose (NO BRAND SPECIFIED) lancets standard Use to test blood sugar 1 times daily or as directed. 100 each 3     blood glucose (NO BRAND SPECIFIED) test strip Use to test blood sugar 1 times daily or as directed.  Dispense Accu-chek Allyson Plus or per patient insurance 100 strip 3     blood glucose monitoring (NO BRAND SPECIFIED) meter device kit Use to test blood sugar 1 times daily or as directed.  Dispense Accu-chek Allyson Plus or per insurance preference 1 kit 0     blood glucose monitoring (NO BRAND SPECIFIED) meter device kit Use to test blood sugar 1 times daily or as directed. 1 kit 0     budesonide-formoterol (SYMBICORT) 160-4.5 MCG/ACT Inhaler Inhale 2  puffs into the lungs 2 times daily 30.6 g 3     buPROPion (WELLBUTRIN XL) 150 MG 24 hr tablet TAKE ONE TABLET BY MOUTH EVERY MORNING 90 tablet 2     cetirizine (ZYRTEC) 10 MG tablet TAKE ONE TABLET BY MOUTH EVERY DAY AS NEEDED 90 tablet 3     clindamycin (CLINDAMAX) 1 % external gel Apply topically 2 times daily 60 g 3     clonazePAM (KLONOPIN) 0.5 MG tablet Take 0.5 mg by mouth daily as needed        clonazePAM (KLONOPIN) 1 MG tablet Take 1 tablet (1 mg) by mouth 2 times daily as needed for anxiety She needs to see her PCP to get more tablets 5 tablet 0     Emollient (CERAVE MOISTURIZING) CREA Externally apply 1 Application topically 2 times daily 453 g 11     fluticasone (FLONASE) 50 MCG/ACT nasal spray Spray 2 sprays in nostril daily (Patient taking differently: Spray 2 sprays in nostril daily as needed) 16 g 5     furosemide (LASIX) 20 MG tablet TAKE ONE TABLET BY MOUTH TWICE A  tablet 3     hydrocortisone 2.5 % cream APPLY TO AFFECTED AREA(S) TOPICALLY TWO TIMES A DAY 28.35 g 1     hydrOXYzine (ATARAX) 50 MG tablet TAKE TWO TABLETS BY MOUTH THREE TIMES A DAY AS NEEDED FOR ANXIETY 70 tablet 0     lamoTRIgine (LAMICTAL) 150 MG tablet Take 150 mg by mouth daily       lamoTRIgine (LAMICTAL) 200 MG tablet Take 200 mg by mouth daily       lisinopril (ZESTRIL) 20 MG tablet TAKE ONE TABLET BY MOUTH EVERY DAY 90 tablet 0     metFORMIN (GLUCOPHAGE) 500 MG tablet TAKE ONE TABLET BY MOUTH EVERY DAY WITH BREAKFAST 90 tablet 1     minoxidil (ROGAINE) 5 % external solution apply per package directions to the scalp once daily 120 mL 3     montelukast (SINGULAIR) 10 MG tablet TAKE ONE TABLET BY MOUTH AT BEDTIME 90 tablet 0     multivitamin, therapeutic (THERA-VIT) TABS tablet Take 1 tablet by mouth daily       naltrexone (DEPADE/REVIA) 50 MG tablet Take 1/2 tablet once daily 1-2 hours prior to worst cravings for 1 week, then increase to 1 tablet daily as directed if tolerating 30 tablet 3     nitroGLYcerin (NITROSTAT)  0.4 MG sublingual tablet PLACE ONE TABLET UNDER THE TONGUE AT THE 1ST SIGN OF ATTACK. IF PAIN IS UNRELIEVED OR WORSENED 5 MINUTES AFTER 1ST DOSE, PROMPT MEDICAL ASSISTANCE IS NEEDED. MAY REPEAT EVERY 5 MINUTES UNTIL PAIN IS RELIEVED. MAX OF THREE DOSES 25 tablet 11     nystatin (MYCOSTATIN) 139923 UNIT/GM external powder APPLY TOPICALLY TWICE A DAY AS NEEDED SKIN RASH 45 g 3     nystatin (MYCOSTATIN) 324637 UNIT/ML suspension Take 5 mLs (500,000 Units) by mouth 4 times daily 60 mL 1     omeprazole (PRILOSEC) 20 MG DR capsule TAKE ONE CAPSULE BY MOUTH TWICE A  capsule 1     ondansetron (ZOFRAN) 8 MG tablet TAKE 1/2 TO 1 TABLET BY MOUTH EVERY 8 HOURS AS NEEDED FOR NAUSEA 30 tablet 0     ondansetron (ZOFRAN-ODT) 4 MG ODT tab Take 1 tablet (4 mg) by mouth every 6 hours as needed for nausea 12 tablet 1     polyethylene glycol (MIRALAX) 17 GM/Dose powder Take 17 g (1 capful) by mouth daily 507 g 1     Semaglutide 3 MG TABS Take 3 mg by mouth daily 30 tablet 1     Semaglutide 7 MG TABS Take 7 mg by mouth daily 30 tablet 3     sulfamethoxazole-trimethoprim (BACTRIM DS) 800-160 MG tablet Take 1 tablet by mouth 2 times daily Take one tablet twice daily. For additional refills, please schedule a follow-up appointment. 180 tablet 1     terbinafine (LAMISIL) 1 % external cream APPLY TO AFFECTED AREA(S) TWO TIMES A DAY 30 g 2     triamcinolone (KENALOG) 0.025 % external ointment Apply topically 2 times daily 80 g 1     Vitamin D3 (CHOLECALCIFEROL) 125 MCG (5000 UT) tablet Take 1 tablet by mouth three times a week         Soc Hx: reviewed  Fam Hx: reviewed          Roro Low MD  Internal Medicine

## 2022-08-17 NOTE — PATIENT INSTRUCTIONS
Plan:  1. Sucralfate 4 times a day as needed  2. Omeprazole 40 mg twice a day for a month then once a day  3. Semaglutide 14 mg daily

## 2022-08-18 DIAGNOSIS — L30.9 ECZEMA, UNSPECIFIED TYPE: ICD-10-CM

## 2022-08-18 LAB
ALBUMIN SERPL-MCNC: 3.9 G/DL (ref 3.4–5)
ALP SERPL-CCNC: 179 U/L (ref 40–150)
ALT SERPL W P-5'-P-CCNC: 47 U/L (ref 0–50)
ANION GAP SERPL CALCULATED.3IONS-SCNC: 10 MMOL/L (ref 3–14)
AST SERPL W P-5'-P-CCNC: 24 U/L (ref 0–45)
BILIRUB SERPL-MCNC: 0.2 MG/DL (ref 0.2–1.3)
BUN SERPL-MCNC: 15 MG/DL (ref 7–30)
CALCIUM SERPL-MCNC: 9.9 MG/DL (ref 8.5–10.1)
CHLORIDE BLD-SCNC: 103 MMOL/L (ref 94–109)
CHOLEST SERPL-MCNC: 161 MG/DL
CK SERPL-CCNC: 64 U/L (ref 30–225)
CO2 SERPL-SCNC: 26 MMOL/L (ref 20–32)
CREAT SERPL-MCNC: 0.8 MG/DL (ref 0.52–1.04)
CREAT UR-MCNC: 167 MG/DL
FASTING STATUS PATIENT QL REPORTED: NO
GFR SERPL CREATININE-BSD FRML MDRD: 88 ML/MIN/1.73M2
GLUCOSE BLD-MCNC: 120 MG/DL (ref 70–99)
HDLC SERPL-MCNC: 46 MG/DL
LDLC SERPL CALC-MCNC: 72 MG/DL
LDLC SERPL CALC-MCNC: ABNORMAL MG/DL
MICROALBUMIN UR-MCNC: 23 MG/L
MICROALBUMIN/CREAT UR: 13.77 MG/G CR (ref 0–25)
NONHDLC SERPL-MCNC: 115 MG/DL
POTASSIUM BLD-SCNC: 4 MMOL/L (ref 3.4–5.3)
PROT SERPL-MCNC: 7.5 G/DL (ref 6.8–8.8)
SODIUM SERPL-SCNC: 139 MMOL/L (ref 133–144)
T4 FREE SERPL-MCNC: 0.96 NG/DL (ref 0.76–1.46)
TRIGL SERPL-MCNC: 423 MG/DL
TSH SERPL DL<=0.005 MIU/L-ACNC: 6.29 MU/L (ref 0.4–4)

## 2022-08-19 DIAGNOSIS — R11.0 NAUSEA: ICD-10-CM

## 2022-08-21 RX ORDER — HYDROCORTISONE 2.5 %
CREAM (GRAM) TOPICAL
Qty: 28.35 G | Refills: 1 | Status: SHIPPED | OUTPATIENT
Start: 2022-08-21 | End: 2022-09-23

## 2022-08-21 NOTE — TELEPHONE ENCOUNTER
Pending Prescriptions:                       Disp   Refills    hydrocortisone 2.5 % cream [Pharmacy Med N*28.35 g1        Sig: APPLY TO AFFECTED AREA(S) TWO TIMES A DAY    Routing refill request to provider for review/approval because:  Drug not on the [facility] refill protocol

## 2022-08-22 ENCOUNTER — MYC MEDICAL ADVICE (OUTPATIENT)
Dept: INTERNAL MEDICINE | Facility: CLINIC | Age: 52
End: 2022-08-22

## 2022-08-22 DIAGNOSIS — E03.9 HYPOTHYROIDISM, UNSPECIFIED TYPE: Primary | ICD-10-CM

## 2022-08-22 RX ORDER — LEVOTHYROXINE SODIUM 50 UG/1
50 TABLET ORAL DAILY
Qty: 90 TABLET | Refills: 1 | Status: SHIPPED | OUTPATIENT
Start: 2022-08-22 | End: 2022-11-11

## 2022-08-22 RX ORDER — ONDANSETRON 8 MG/1
TABLET, FILM COATED ORAL
Qty: 45 TABLET | Refills: 3 | Status: SHIPPED | OUTPATIENT
Start: 2022-08-22 | End: 2023-01-25

## 2022-09-06 DIAGNOSIS — F41.9 ANXIETY: ICD-10-CM

## 2022-09-06 RX ORDER — HYDROXYZINE HYDROCHLORIDE 50 MG/1
TABLET, FILM COATED ORAL
Qty: 70 TABLET | Refills: 0 | Status: SHIPPED | OUTPATIENT
Start: 2022-09-06 | End: 2022-09-15

## 2022-09-07 ENCOUNTER — TELEPHONE (OUTPATIENT)
Dept: INTERNAL MEDICINE | Facility: CLINIC | Age: 52
End: 2022-09-07

## 2022-09-07 NOTE — TELEPHONE ENCOUNTER
Fax received from [facility] - Physician's Order/Med List 09/01/22 for review and signature.  Put in Dr. Low's in basket.

## 2022-09-08 ENCOUNTER — VIRTUAL VISIT (OUTPATIENT)
Dept: ENDOCRINOLOGY | Facility: CLINIC | Age: 52
End: 2022-09-08
Payer: COMMERCIAL

## 2022-09-08 VITALS — WEIGHT: 215 LBS | BODY MASS INDEX: 38.09 KG/M2

## 2022-09-08 DIAGNOSIS — Z71.3 NUTRITIONAL COUNSELING: Primary | ICD-10-CM

## 2022-09-08 DIAGNOSIS — E66.812 OBESITY, CLASS II, BMI 35-39.9: ICD-10-CM

## 2022-09-08 DIAGNOSIS — E66.813 CLASS 3 SEVERE OBESITY DUE TO EXCESS CALORIES WITH SERIOUS COMORBIDITY AND BODY MASS INDEX (BMI) OF 40.0 TO 44.9 IN ADULT (H): ICD-10-CM

## 2022-09-08 DIAGNOSIS — E66.01 CLASS 3 SEVERE OBESITY DUE TO EXCESS CALORIES WITH SERIOUS COMORBIDITY AND BODY MASS INDEX (BMI) OF 40.0 TO 44.9 IN ADULT (H): ICD-10-CM

## 2022-09-08 PROCEDURE — 97803 MED NUTRITION INDIV SUBSEQ: CPT | Mod: GT | Performed by: DIETITIAN, REGISTERED

## 2022-09-08 RX ORDER — NALTREXONE HYDROCHLORIDE 50 MG/1
TABLET, FILM COATED ORAL
Qty: 30 TABLET | Status: CANCELLED | OUTPATIENT
Start: 2022-09-08

## 2022-09-08 NOTE — TELEPHONE ENCOUNTER
naltrexone (DEPADE/REVIA) 50 MG tablet  Last Written Prescription Date:  5/16/2022  Last Fill Quantity: 30,   # refills: 3  Last Office Visit :  6/9/2022  Future Office visit:   None    Routing refill request to provider for review/approval because:  Nothing mentioned is last note to continue this med.   Refer to clinic for review and refills per providers orders.       Madonna Pang RN  Central Triage Red Flags/Med Refills

## 2022-09-08 NOTE — LETTER
HR=74 bpm, LLHI=652/72 mmhg, OgL5=264.0 %, Resp=8 B/min, EtCO2=36 mmHg, Apnea=4 Seconds, Haider=10, Comment=[facility]  [address], [address]  [place], [place]  [address]                                            TEL:[phone]  FAX:[phone]      Swetha Patterson  [address]  [place] [address]      October 24, 2017    Dear Swetha Patterson has been my patient for a few years. She has been ill for a few weeks and she is not recovering well. Please excuse her from classes through November 31st, 2017.    Sincerely,      Roro Chawla M.D.

## 2022-09-08 NOTE — PROGRESS NOTES
"Swetha Patterson is a 52 year old female who is being evaluated via a billable video visit.      The patient has been notified of following:     \"This video visit will be conducted via a call between you and your physician/provider. We have found that certain health care needs can be provided without the need for an in-person physical exam.  This service lets us provide the care you need with a video conversation.  If a prescription is necessary we can send it directly to your pharmacy.  If lab work is needed we can place an order for that and you can then stop by our lab to have the test done at a later time.    Video visits are billed at different rates depending on your insurance coverage.  Please reach out to your insurance provider with any questions.    If during the course of the call the physician/provider feels a video visit is not appropriate, you will not be charged for this service.\"    Patient has given verbal consent for Video visit? Yes  How would you like to obtain your AVS? MyChart  If you are dropped fro the video visit, the video invite should be resent to: Text to cell phone: [phone]  Will anyone else be joining your video visit? No  {If patient encounters technical issues they should call [phone]      Video-Visit Details    Type of service:  Video Visit    Video Start Time: 1:13 pm  Video End Time: 1:34 pm    Originating Location (pt. Location): Home    Distant Location (provider location):  [facility] [place]     Platform used for Video Visit: [facility]    During this virtual visit the patient is located in [place], patient verifies this as the location during the entirety of this visit.       Bariatric Nutrition Consultation Note    Reason For Visit: Nutrition Assessment    Swetha Patterson is a 51 year old presenting today for return bariatric nutrition consult.   Pt is interested in laparoscopic sleeve gastrectomy.    Pt has met the minimum required RD " "appointments but does need formal post op education once she has a surgery date.     Pt referred by NOE Blake on January 20, 2022.    Coordination note:   Needs Psych eval  - On file 7/6/22 from Dr. Hartman  Needs letter of support from therapist and psychiatrist   Needs clearance from sleep medicine - In progress, had to reschedule sleep study due to being sick  Needs PCP letter of support - Done per pt.   Surgeon meet and greet with Dr. Leach - Done 6/9/22   No nicotine for 3 months prior to surgery - Working on  Sobriety from alcohol for 1 year - May 2022 - Met    Needs baseline labs - Done 1/24/22  10 lb weight loss from initial - Met    Patient with Co-morbidities of obesity including:  Type II DM no but does have pre-diabetes, last A1C 5.8 on 1/24/22  Renal Failure no  Sleep apnea yes  Hypertension yes  Dyslipidemia yes  Joint pain no  Back pain no  GERD no     PMH: fatty liver (nonalcoholic per pt), fibromyalgia, cholecystomy, asthma    Support System Reviewed With Patient 1/19/2022   Who do you have in your support network that can be available to help you for the first 2 weeks after surgery? I have my sister Rukhsana a very close friend of keri Mckinley and I have my dad and I also have psychiatrist my sister Rukhsana Kearns my dad and my psychiatrist again Rukhsana Milian   Who can you count on for support throughout your weight loss surgery journey? Rukhsana Kearns my dad my psychiatrist       ANTHROPOMETRICS:  Consult weight 1/20/22: 247 lbs with BMI 43.75    Estimated body mass index is 39.68 kg/m  as calculated from the following:    Height as of 8/17/22: 1.6 m (5' 3\").    Weight as of 8/17/22: 101.6 kg (224 lb).     Current weight: going between 215 lbs, pt report    Required weight loss goal pre-op: 10 lbs from initial consult weight (goal weight 237 lbs or less before surgery)       1/19/2022   I have tried the following methods to lose weight Watching portions or calories, " Exercise, Physician directed program       Weight Loss Questions Reviewed With Patient 1/19/2022   How long have you been overweight? From Middle age and beyond     MEDICATIONS FOR WEIGHT LOSS:  Semaglutide  Naltrexone      Also takes Wellbutrin    SUPPLEMENT INFORMATION:  Vit D 5000 IUS/ MWF  One daily Womens MVI - not chewable    Vit D WNL 1/24/22, hx of vit d def    Prefers oral B12 after surgery - does not like needles    Labs done 8/17  Alk Phos elevated  AST/ALT WNL now  TSH high   Fasting Glucose high     NUTRITION HISTORY:  NKFA  Possible lactose intolerance     Pt s friend had sleeve surgery - is familiar with some of it through her. Went well for friend.    Nicotine: Smoked for ~40+ years per pt. Quit cigarettes two weeks ago, using patch and gum. Refer to MTM. None for 3 months prior to surgery    Alcohol: hx of abuse. Sober since May 2021. No surgery until May 2022    Please see previous RD note for more detail.    March 2022:    Discussed task list for surgery.     Doing well with dietary goals  ? Chewing food well (counting to 15-20)  ? Smaller portions   ? Continues to be alcohol free  ? Continues to drink slower   ?  fluids from meal time for most part (might have a sip here/there) - at about 20 minutes with none right now    Protein sources: pb, yogurt, cheese, chicken and turkey     Recent recall:  - Banana  - Mixed fruit  - Cheese   - 2 Tbsp pb on celery   - Vegetable Soup   - 1 slice of pepperoni pizza     PA: walking lots    April 2022:    Pt shared she has not had a BM since Friday. Thinks it is related to the Rybelsus (Semglatuide) she recently started. She is hoping to stop the medication. Scared to eat right now due to being backed up.     Didn t eat anything yesterday - today only had two pickles thus far.      Continues to do well with dietary goals and weight loss. However, pt is struggling with changes in her body and  saggy skin . Pt concerned about how she will look going  forward. She is not sure how to proceed right now - hesitant but also not wanting to give up.    Has found that if she overeats she will vomit - stomach is helping her know when to stop.     May 2022:  Was struggling with night eating - better now. Increased dose of medication and none since.    Continues to do well with goals -  fluids during meal time, chewing well, setting utensil down, making good food choices.    Has practiced eating on smaller plates (saucer size)    Doing well resisting temptations.     Fluids: water, 1 coke zero/day, V8 juice occ, sugar-free flavorings in water    BM - was constipated but made some changes (more fiber, etc)     Nicotine: none - was using patches but stopped     PA: walking or moving around house    June 2022:    Really sick - bed for 3 days. Throws up anytime she tries to get up. Can t hold any food down - trying to stay hydrated. Lost 5 lbs. At home nurse came yesterday - said temperature was only 85 degrees. Nurse checked 3x - both temples and wrists. Then went up to 95. Temp today is 99.1. Checking temp about every 4 hrs. Checked for covid - negative.     Ate some toast today and munching on crackers. Plans to try ramen noodles next. Also doing popsicles. Also doing some electrolyte water. Recommended she try some Ensure.     ? Cut out all carbonated beverages.  ? Continues with smaller portions.   ? Bought some protein shakes for after surgery (Ensure Max protein).   ? No alcohol.  ? No nicotine  ?  fluids from meal and none for up to 20 min after (working towards 30 min)    Normal breakfast before getting sick was egg whites. Not keeping sweets in house. Eating well per pt - will grab a yogurt if craving something sweet.     Weight was up to 230 lbs, noticed an increase in cravings. Started Naltrexone. Now down to 216 (5 lb loss since being sick).     Lab appt scheduled for 7/18. Stopped using nicotine patches right after seeing Rashmi  4/27/22    Recently got an exercise bike from a friend.    July 2022:  Going on vacation for 3 weeks. Leaving later today. Heading to [place]     Continues with goals  ? Lots of vegetables  ? Leaner protein sources (turkey as opposed to beef)  ? Smaller portions  ? No alcohol   ? No smoking (is exposed to second hand smoke from friends/dad)  ?  fluids from meal time  ? Staying well hydrated   ? No pop  ? No juices     Tried milk and bread - did not sit well per pt.     Recent recall:  ? Mixed vegetables  ? 1/2 turkey burger (no bun)  ? Brussel sprouts   ? Yogurt   ? Orange   ? Water    Going to start working with Mercedes Whitfield (first appt 9/29/22)    Plans to look for a MVI with iron this month. Re-reviewed needs.    Pt reports mental health is good - only taking clonazepam 1x/day (use to be 2x). No depression, some anxiety but mostly due to excitement for surgery.     PA: lots of walking, got a foot exercise thing as well  Bought some small dumbells for after surgery.    August 2022:  Lots of stress this past month. Higher cravings, hard to control. Also noticing lots of anxiety - not sure about surgery but wants to continue with process.    Had 2 cans of pop and a couple cookies- feels self slipping back into old dietary habits.     Going for walks to help. Plans to get a gym membership.    Has continued to separate fluids (up to 10 minutes post meals currently) and work on other dietary goals (portion control). If wanting a snack tries to have cottage cheese or yogurt.    B - 1/2 packet of oatmeal   L - deli meat with raw vegetable on side  D - varies    Also having lots of issues with hernia - pain and getting sick. Hernia is getting bigger per pt. Hard to keep food down. Lots of reflux. Taking meds but not helping per pt. Seeing PCP 8/17/22.     Sept 2022:  Pt saw PCP last month for concerns regarding symptoms likely related to her hernia.   Checked labs - TSH was high, started medication.     Doing  well with dietary goals overall per pt but struggling with giving up pop (having headaches).     Intake unclear.   Likely not getting enough protein - craving meat.     Chewing improving - got teeth yesterday. Had partials before.     PA: walking lots, limited. still having pain with hernia, can t bend over fully.     Recall Diet Questions Reviewed With Patient 1/19/2022   Describe what you typically consume for breakfast (typical or most recent): I usually don't eat much for breakfast maybe a piece of toast.   Describe what you typically consume for lunch (typical or most recent): Sometimes hot dogs or macaroni and cheese or hamburgers or fast food or eggs or just about anything   Describe what you typically consume for supper (typical or most recent): Always have to have meat with my dinner potato vegetable if it's corn or green beans but I prefer peas and then I snack at night time before bed because I'm so hungry   Describe what you typically consume as snacks (typical or most recent): Cereal bars chips sometimes fruit a hamburger fast food   How many ounces of water, or other low calorie drinks, do you drink daily (8 oz=1 glass)? 48 oz   How many ounces of caffeine (coffee, tea, pop) do you drink daily (8 oz=1 glass)? 8 oz   How many ounces of carbonated (pop, beer, sparkling water) drinks do you drinky daily (8 oz=1 glass)? 8 oz   How many ounces of juice, pop, sweet tea, sports drinks, protein drinks, other sweetened drinks, do you drink daily (8 oz=1 glass)? 24 oz   How many ounces of milk do you drink daily (8 oz=1 glass) 8 oz   Please indicate the type of milk: 2%   How often do you drink alcohol? Never   If you do drink alcohol, how many drinks might you have in a day? (one drink = 5 oz. wine, 1 can/bottle of beer, 1 shot liquor) -       Eating Habits 1/19/2022   Do you have any dietary restrictions? Yes   Do you currently binge eat (eat a large amount of food in a short time)? -   Are you an emotional  eater? No   Do you get up to eat after falling asleep? Yes   What foods do you crave? I crave Pizza macaroni and cheese [employer] hamburger french fries but I do still eat my vegetables     Dining Out History Reviewed With Patient 1/19/2022   How often do you dine out? A couple of times a week.   Where do you dine out? (select all that apply) fast food chains, take out   What types of food do you order when you dine out? Some type of meat potato and of course you got to have a green beans or corn       Physical Activity Reviewed With Patient 2/22/2022   How often do you exercise? Daily   What is the duration of your exercise (in minutes)? 30 Minutes   What types of exercise do you do? walking, other   What keeps you from being more active?  I am as active as I can possbily be       NUTRITION DIAGNOSIS:  Obesity r/t long history of positive energy balance aeb BMI >30 kg/m2.    INTERVENTION:  Intervention Provided/Education Provided on post-op diet guidelines, vitamins/minerals essential post-operatively, GI anatomy of bariatric surgeries, ways to help prepare for post-op diet guidelines pre-operatively, portion/calorie-control, mindful eating and sources of protein.  Patient demonstrates understanding. Provided pt with list of goals RD contact information.      Questions Reviewed With Patient 1/19/2022   How ready are you to make changes regarding your weight? Number 1 = Not ready at all to make changes up to 10 = very ready. 10   How confident are you that you can change? 1 = Not confident that you will be successful making changes up to 10 = very confident. 10       Expected Engagement: good    GOALS:  1. Consider making soups with bone broth to increase protein. Kettle & Fire Bone broth has 10 g per 1 cup     2. Keep up the great work!    - No alcohol   - No smoking   - Seperating fluids from meal time    - Sipping fluids   - Chewing/pace   - No carbonation after surgery    3.. Will need 60 grams of protein/day  minimum after surgery    Non-meat Protein Ideas  ? Quinoa  ? Eggs  ? Nuts  ? Beans  ? Lentils  ? Protein pasta   ? Nutritional yeast  ? Garden of life raw meal powder  ? Liquid aminos  ? Homemade meats - a taco meat could be made with chopped cauliflower/mushroom as an example   ? Hemp hearts       Vegetarian Meals   [email or web address]     Protein Sources   [email or web address]       High Fiber Foods:  Bran cereal, 1/3 cup, 8.6 gm fiber   Cooked kidney beans   cup 7.9 gm  Cooked lentils   cup 7.8 gm  Cooked black beans   cup 7.6 gm  Canned chickpeas   cup 5.3 gm  Baked beans   cup 5.2 gm  Pear 1 5.1 gm  Soybeans   cup 5.1 gm  Quinoa   cup 5 gm  Baked sweet potato, with skin 1 medium 4.8 gm  Baked potato, with skin 1 medium 4.4 gm  Cooked frozen green peas   cup 4.4 gm  Bulgur   cup 4.1 gm  Cooked frozen mixed vegetables   cup 4 gm  Raspberries   cup 4 gm  Blackberries   cup 3.8 gm  Almonds 1 oz 3.5 gm  Cooked frozen spinach   cup 3.5 gm  Vegetable or soy yony 1 each 3.4 gm  Apple 1 medium 3.3 gm  Dried dates 5 pieces 3.3 gm    Surgery resources:  Diet Guidelines after Weight Loss Surgery  [email or web address]     Post-op Diet Advancement Schedule:  Clear Liquid Diet (stage 1):  TBD   *No RED, PROTEIN or PULP day before surgery  Low-Fat Full Liquid Diet (stage 2): TBD (2 weeks total between clear/liquid)  Pureed Diet (stage 3): TBD (2 weeks)  Soft Diet (stage 4): TBD (4 weeks)  Regular Diet (stage 5): TBD     Diabetes Plate Method:  https://www.diabetesfoodhub.org/articles/what-is-the-diabetes-plate-method.html    Your Stage 1 Diet: Clear Liquids  [email or web address]     Your Stage 2 Diet: Low-fat Full Liquids  [email or web address]     Your Stage 3 Diet: Pureed Foods  [email or web address]     Pureed Recipes  [email or web address]    Your Stage 4 Diet: Soft Foods  [email or web address]    Your Stage 5 Diet: Regular  Foods  [email or web address]    Follow up:   Tuesday, October 11th at 1:30 pm    Time spent with patient: 21 minutes.  SHELLY Neal, RD, LD

## 2022-09-08 NOTE — LETTER
"9/8/2022       RE: Swetha Patterson  [id number]   [address]  [facility] [place] [address]     Dear Colleague,    Thank you for referring your patient, Swetha Patterson, to the [facility] [place] at [facility]. Please see a copy of my visit note below.    Swetha Patterson is a 52 year old female who is being evaluated via a billable video visit.      The patient has been notified of following:     \"This video visit will be conducted via a call between you and your physician/provider. We have found that certain health care needs can be provided without the need for an in-person physical exam.  This service lets us provide the care you need with a video conversation.  If a prescription is necessary we can send it directly to your pharmacy.  If lab work is needed we can place an order for that and you can then stop by our lab to have the test done at a later time.    Video visits are billed at different rates depending on your insurance coverage.  Please reach out to your insurance provider with any questions.    If during the course of the call the physician/provider feels a video visit is not appropriate, you will not be charged for this service.\"    Patient has given verbal consent for Video visit? Yes  How would you like to obtain your AVS? MyChart  If you are dropped fro the video visit, the video invite should be resent to: Text to cell phone: [phone]  Will anyone else be joining your video visit? No  {If patient encounters technical issues they should call [phone]      Video-Visit Details    Type of service:  Video Visit    Video Start Time: 1:13 pm  Video End Time: 1:34 pm    Originating Location (pt. Location): Home    Distant Location (provider location):  [facility] [place]     Platform used for Video Visit: [employer]    During this virtual visit the patient is located in [place], patient verifies this " as the location during the entirety of this visit.       Bariatric Nutrition Consultation Note    Reason For Visit: Nutrition Assessment    Swetha Patterson is a 51 year old presenting today for return bariatric nutrition consult.   Pt is interested in laparoscopic sleeve gastrectomy.    Pt has met the minimum required RD appointments but does need formal post op education once she has a surgery date.     Pt referred by NOE Blake on January 20, 2022.    Coordination note:   Needs Psych eval  - On file 7/6/22 from Dr. Hartman  Needs letter of support from therapist and psychiatrist   Needs clearance from sleep medicine - In progress, had to reschedule sleep study due to being sick  Needs PCP letter of support - Done per pt.   Surgeon meet and greet with Dr. Leach - Done 6/9/22   No nicotine for 3 months prior to surgery - Working on  Sobriety from alcohol for 1 year - May 2022 - Met    Needs baseline labs - Done 1/24/22  10 lb weight loss from initial - Met    Patient with Co-morbidities of obesity including:  Type II DM no but does have pre-diabetes, last A1C 5.8 on 1/24/22  Renal Failure no  Sleep apnea yes  Hypertension yes  Dyslipidemia yes  Joint pain no  Back pain no  GERD no     PMH: fatty liver (nonalcoholic per pt), fibromyalgia, cholecystomy, asthma    Support System Reviewed With Patient 1/19/2022   Who do you have in your support network that can be available to help you for the first 2 weeks after surgery? I have my sister Rukhsana a very close friend of keri Navarrete gautam Curtismy and I have my dad and I also have psychiatrist my sister Rukhsana Kearns my dad and my psychiatrist again Rukhsana Milian   Who can you count on for support throughout your weight loss surgery journey? Rukhsana Kearns my dad my psychiatrist       ANTHROPOMETRICS:  Consult weight 1/20/22: 247 lbs with BMI 43.75    Estimated body mass index is 39.68 kg/m  as calculated from the following:    Height as of 8/17/22: 1.6 m (5'  "3\").    Weight as of 8/17/22: 101.6 kg (224 lb).     Current weight: going between 215 lbs, pt report    Required weight loss goal pre-op: 10 lbs from initial consult weight (goal weight 237 lbs or less before surgery)       1/19/2022   I have tried the following methods to lose weight Watching portions or calories, Exercise, Physician directed program       Weight Loss Questions Reviewed With Patient 1/19/2022   How long have you been overweight? From Middle age and beyond     MEDICATIONS FOR WEIGHT LOSS:  Semaglutide  Naltrexone      Also takes Wellbutrin    SUPPLEMENT INFORMATION:  Vit D 5000 IUS/ MWF  One daily Womens MVI - not chewable    Vit D WNL 1/24/22, hx of vit d def    Prefers oral B12 after surgery - does not like needles    Labs done 8/17  Alk Phos elevated  AST/ALT WNL now  TSH high   Fasting Glucose high     NUTRITION HISTORY:  NKFA  Possible lactose intolerance     Pt s friend had sleeve surgery - is familiar with some of it through her. Went well for friend.    Nicotine: Smoked for ~40+ years per pt. Quit cigarettes two weeks ago, using patch and gum. Refer to MTM. None for 3 months prior to surgery    Alcohol: hx of abuse. Sober since May 2021. No surgery until May 2022    Please see previous RD note for more detail.    March 2022:    Discussed task list for surgery.     Doing well with dietary goals  ? Chewing food well (counting to 15-20)  ? Smaller portions   ? Continues to be alcohol free  ? Continues to drink slower   ?  fluids from meal time for most part (might have a sip here/there) - at about 20 minutes with none right now    Protein sources: pb, yogurt, cheese, chicken and turkey     Recent recall:  - Banana  - Mixed fruit  - Cheese   - 2 Tbsp pb on celery   - Vegetable Soup   - 1 slice of pepperoni pizza     PA: walking lots    April 2022:    Pt shared she has not had a BM since Friday. Thinks it is related to the Rybelsus (Semglatuide) she recently started. She is hoping to " stop the medication. Scared to eat right now due to being backed up.     Didn t eat anything yesterday - today only had two pickles thus far.      Continues to do well with dietary goals and weight loss. However, pt is struggling with changes in her body and  saggy skin . Pt concerned about how she will look going forward. She is not sure how to proceed right now - hesitant but also not wanting to give up.    Has found that if she overeats she will vomit - stomach is helping her know when to stop.     May 2022:  Was struggling with night eating - better now. Increased dose of medication and none since.    Continues to do well with goals -  fluids during meal time, chewing well, setting utensil down, making good food choices.    Has practiced eating on smaller plates (saucer size)    Doing well resisting temptations.     Fluids: water, 1 coke zero/day, V8 juice occ, sugar-free flavorings in water    BM - was constipated but made some changes (more fiber, etc)     Nicotine: none - was using patches but stopped     PA: walking or moving around house    June 2022:    Really sick - bed for 3 days. Throws up anytime she tries to get up. Can t hold any food down - trying to stay hydrated. Lost 5 lbs. At home nurse came yesterday - said temperature was only 85 degrees. Nurse checked 3x - both temples and wrists. Then went up to 95. Temp today is 99.1. Checking temp about every 4 hrs. Checked for covid - negative.     Ate some toast today and munching on crackers. Plans to try ramen noodles next. Also doing popsicles. Also doing some electrolyte water. Recommended she try some Ensure.     ? Cut out all carbonated beverages.  ? Continues with smaller portions.   ? Bought some protein shakes for after surgery (Ensure Max protein).   ? No alcohol.  ? No nicotine  ?  fluids from meal and none for up to 20 min after (working towards 30 min)    Normal breakfast before getting sick was egg whites. Not keeping  sweets in house. Eating well per pt - will grab a yogurt if craving something sweet.     Weight was up to 230 lbs, noticed an increase in cravings. Started Naltrexone. Now down to 216 (5 lb loss since being sick).     Lab appt scheduled for 7/18. Stopped using nicotine patches right after seeing Rashmi 4/27/22    Recently got an exercise bike from a friend.    July 2022:  Going on vacation for 3 weeks. Leaving later today. Heading to [place]     Continues with goals  ? Lots of vegetables  ? Leaner protein sources (turkey as opposed to beef)  ? Smaller portions  ? No alcohol   ? No smoking (is exposed to second hand smoke from friends/dad)  ?  fluids from meal time  ? Staying well hydrated   ? No pop  ? No juices     Tried milk and bread - did not sit well per pt.     Recent recall:  ? Mixed vegetables  ? 1/2 turkey burger (no bun)  ? Brussel sprouts   ? Yogurt   ? Orange   ? Water    Going to start working with Mercedes Whitfield (first appt 9/29/22)    Plans to look for a MVI with iron this month. Re-reviewed needs.    Pt reports mental health is good - only taking clonazepam 1x/day (use to be 2x). No depression, some anxiety but mostly due to excitement for surgery.     PA: lots of walking, got a foot exercise thing as well  Bought some small dumbells for after surgery.    August 2022:  Lots of stress this past month. Higher cravings, hard to control. Also noticing lots of anxiety - not sure about surgery but wants to continue with process.    Had 2 cans of pop and a couple cookies- feels self slipping back into old dietary habits.     Going for walks to help. Plans to get a gym membership.    Has continued to separate fluids (up to 10 minutes post meals currently) and work on other dietary goals (portion control). If wanting a snack tries to have cottage cheese or yogurt.    B - 1/2 packet of oatmeal   L - deli meat with raw vegetable on side  D - varies    Also having lots of issues with hernia - pain and  getting sick. Hernia is getting bigger per pt. Hard to keep food down. Lots of reflux. Taking meds but not helping per pt. Seeing PCP 8/17/22.     Sept 2022:  Pt saw PCP last month for concerns regarding symptoms likely related to her hernia.   Checked labs - TSH was high, started medication.     Doing well with dietary goals overall per pt but struggling with giving up pop (having headaches).     Intake unclear.   Likely not getting enough protein - craving meat.     Chewing improving - got teeth yesterday. Had partials before.     PA: walking lots, limited. still having pain with hernia, can t bend over fully.     Recall Diet Questions Reviewed With Patient 1/19/2022   Describe what you typically consume for breakfast (typical or most recent): I usually don't eat much for breakfast maybe a piece of toast.   Describe what you typically consume for lunch (typical or most recent): Sometimes hot dogs or macaroni and cheese or hamburgers or fast food or eggs or just about anything   Describe what you typically consume for supper (typical or most recent): Always have to have meat with my dinner potato vegetable if it's corn or green beans but I prefer peas and then I snack at night time before bed because I'm so hungry   Describe what you typically consume as snacks (typical or most recent): Cereal bars chips sometimes fruit a hamburger fast food   How many ounces of water, or other low calorie drinks, do you drink daily (8 oz=1 glass)? 48 oz   How many ounces of caffeine (coffee, tea, pop) do you drink daily (8 oz=1 glass)? 8 oz   How many ounces of carbonated (pop, beer, sparkling water) drinks do you drinky daily (8 oz=1 glass)? 8 oz   How many ounces of juice, pop, sweet tea, sports drinks, protein drinks, other sweetened drinks, do you drink daily (8 oz=1 glass)? 24 oz   How many ounces of milk do you drink daily (8 oz=1 glass) 8 oz   Please indicate the type of milk: 2%   How often do you drink alcohol? Never   If  you do drink alcohol, how many drinks might you have in a day? (one drink = 5 oz. wine, 1 can/bottle of beer, 1 shot liquor) -       Eating Habits 1/19/2022   Do you have any dietary restrictions? Yes   Do you currently binge eat (eat a large amount of food in a short time)? -   Are you an emotional eater? No   Do you get up to eat after falling asleep? Yes   What foods do you crave? I crave Pizza macaroni and cheese Subway hamburger french fries but I do still eat my vegetables     Dining Out History Reviewed With Patient 1/19/2022   How often do you dine out? A couple of times a week.   Where do you dine out? (select all that apply) fast food chains, take out   What types of food do you order when you dine out? Some type of meat potato and of course you got to have a green beans or corn       Physical Activity Reviewed With Patient 2/22/2022   How often do you exercise? Daily   What is the duration of your exercise (in minutes)? 30 Minutes   What types of exercise do you do? walking, other   What keeps you from being more active?  I am as active as I can possbily be       NUTRITION DIAGNOSIS:  Obesity r/t long history of positive energy balance aeb BMI >30 kg/m2.    INTERVENTION:  Intervention Provided/Education Provided on post-op diet guidelines, vitamins/minerals essential post-operatively, GI anatomy of bariatric surgeries, ways to help prepare for post-op diet guidelines pre-operatively, portion/calorie-control, mindful eating and sources of protein.  Patient demonstrates understanding. Provided pt with list of goals RD contact information.      Questions Reviewed With Patient 1/19/2022   How ready are you to make changes regarding your weight? Number 1 = Not ready at all to make changes up to 10 = very ready. 10   How confident are you that you can change? 1 = Not confident that you will be successful making changes up to 10 = very confident. 10       Expected Engagement: good    GOALS:  1. Consider making  soups with bone broth to increase protein. Kettle & Fire Bone broth has 10 g per 1 cup     2. Keep up the great work!    - No alcohol   - No smoking   - Seperating fluids from meal time    - Sipping fluids   - Chewing/pace   - No carbonation after surgery    3.. Will need 60 grams of protein/day minimum after surgery    Non-meat Protein Ideas  ? Quinoa  ? Eggs  ? Nuts  ? Beans  ? Lentils  ? Protein pasta   ? Nutritional yeast  ? Garden of life raw meal powder  ? Liquid aminos  ? Homemade meats - a taco meat could be made with chopped cauliflower/mushroom as an example   ? Hemp hearts       Vegetarian Meals   [email or web address]-recipes/[email or web address]     Protein Sources   [email or web address]       High Fiber Foods:  Bran cereal, 1/3 cup, 8.6 gm fiber   Cooked kidney beans   cup 7.9 gm  Cooked lentils   cup 7.8 gm  Cooked black beans   cup 7.6 gm  Canned chickpeas   cup 5.3 gm  Baked beans   cup 5.2 gm  Pear 1 5.1 gm  Soybeans   cup 5.1 gm  Quinoa   cup 5 gm  Baked sweet potato, with skin 1 medium 4.8 gm  Baked potato, with skin 1 medium 4.4 gm  Cooked frozen green peas   cup 4.4 gm  Bulgur   cup 4.1 gm  Cooked frozen mixed vegetables   cup 4 gm  Raspberries   cup 4 gm  Blackberries   cup 3.8 gm  Almonds 1 oz 3.5 gm  Cooked frozen spinach   cup 3.5 gm  Vegetable or soy yony 1 each 3.4 gm  Apple 1 medium 3.3 gm  Dried dates 5 pieces 3.3 gm    Surgery resources:  Diet Guidelines after Weight Loss Surgery  [email or web address]     Post-op Diet Advancement Schedule:  Clear Liquid Diet (stage 1):  TBD   *No RED, PROTEIN or PULP day before surgery  Low-Fat Full Liquid Diet (stage 2): TBD (2 weeks total between clear/liquid)  Pureed Diet (stage 3): TBD (2 weeks)  Soft Diet (stage 4): TBD (4 weeks)  Regular Diet (stage 5): TBD     Diabetes Plate Method:  https://www.diabetesfoodhub.org/articles/what-is-the-diabetes-plate-method.html    Your Stage 1 Diet: Clear  Liquids  [email or web address]     Your Stage 2 Diet: Low-fat Full Liquids  [email or web address]     Your Stage 3 Diet: Pureed Foods  [email or web address]     Pureed Recipes  [email or web address]    Your Stage 4 Diet: Soft Foods  [email or web address]    Your Stage 5 Diet: Regular Foods  [email or web address]    Follow up:   Tuesday, October 11th at 1:30 pm    Time spent with patient: 21 minutes.  SHELLY Neal, RD, LD

## 2022-09-08 NOTE — TELEPHONE ENCOUNTER
Reason for call:  Medication   If this is a refill request, has the caller requested the refill from the pharmacy already? Yes  Will the patient be using a [facility]? No  Name of the pharmacy and phone number for the current request: [facility]    Name of the medication requested: Naltrexone      Other request:     Phone number to reach patient:  Home number on file [phone] (home)    Best Time:      Can we leave a detailed message on this number?  YES    Travel screening: Not Applicable

## 2022-09-09 NOTE — TELEPHONE ENCOUNTER
Last visit with Rashmi Del Rio PA-C was in April 2022. Patient is due for appointment. Refill denied at this time. [employer] message sent to patient to schedule an appointment.

## 2022-09-12 ENCOUNTER — MEDICAL CORRESPONDENCE (OUTPATIENT)
Dept: HEALTH INFORMATION MANAGEMENT | Facility: CLINIC | Age: 52
End: 2022-09-12

## 2022-09-14 ENCOUNTER — MEDICAL CORRESPONDENCE (OUTPATIENT)
Dept: HEALTH INFORMATION MANAGEMENT | Facility: CLINIC | Age: 52
End: 2022-09-14

## 2022-09-15 ENCOUNTER — E-VISIT (OUTPATIENT)
Dept: INTERNAL MEDICINE | Facility: CLINIC | Age: 52
End: 2022-09-15
Payer: COMMERCIAL

## 2022-09-15 ENCOUNTER — TELEPHONE (OUTPATIENT)
Dept: INTERNAL MEDICINE | Facility: CLINIC | Age: 52
End: 2022-09-15

## 2022-09-15 DIAGNOSIS — G89.29 OTHER CHRONIC PAIN: Primary | ICD-10-CM

## 2022-09-15 DIAGNOSIS — J44.9 COPD, MODERATE (H): ICD-10-CM

## 2022-09-15 PROCEDURE — 99422 OL DIG E/M SVC 11-20 MIN: CPT | Performed by: INTERNAL MEDICINE

## 2022-09-15 NOTE — TELEPHONE ENCOUNTER
"Attempted to call patient. Received message stating \"the person you are calling can not accept calls at this time\". Please try again.   "

## 2022-09-15 NOTE — TELEPHONE ENCOUNTER
Patient calling and in a lot of pain the last two day with her fibromyalagia. Patient requesting medication sent to [facility] in [place]. Ok to call and lm [phone]

## 2022-09-15 NOTE — TELEPHONE ENCOUNTER
Call back from patient. States her fibromyalgia pain has been really bad for the last couple of days. States she could hardly get out of bed this morning because of pain and swelling in her hips. States the pain is also in her legs and feet. States it has been a long time since she has had a flare up so she doesn't remember what Dr. Low has given her in the past.    Advised alma.

## 2022-09-16 RX ORDER — ALBUTEROL SULFATE 90 UG/1
2 AEROSOL, METERED RESPIRATORY (INHALATION) EVERY 6 HOURS
Qty: 18 G | Refills: 0 | Status: SHIPPED | OUTPATIENT
Start: 2022-09-16 | End: 2023-02-21

## 2022-09-16 NOTE — TELEPHONE ENCOUNTER
Routing refill request to provider for review/approval because:  ACT not current:      ACT Total Scores 10/11/2019 4/13/2021 5/25/2021   ACT TOTAL SCORE - - -   ASTHMA ER VISITS - - -   ASTHMA HOSPITALIZATIONS - - -   ACT TOTAL SCORE (Goal Greater than or Equal to 20) 7 7 -   In the past 12 months, how many times did you visit the emergency room for your asthma without being admitted to the hospital? 2 0 1   In the past 12 months, how many times were you hospitalized overnight because of your asthma? 0 0 1     Raisa WANG RN

## 2022-09-16 NOTE — TELEPHONE ENCOUNTER
Provider E-Visit time total (minutes): 15      Patient with polypharmacy.  I checked Lyrica, Gabapentin and Cymbalta for drug interactions with her other meds and there are interactions.     No prescription     Ref to pain clinic

## 2022-09-22 DIAGNOSIS — L30.9 ECZEMA, UNSPECIFIED TYPE: ICD-10-CM

## 2022-09-23 RX ORDER — HYDROCORTISONE 2.5 %
CREAM (GRAM) TOPICAL
Qty: 28.35 G | Refills: 1 | Status: SHIPPED | OUTPATIENT
Start: 2022-09-23 | End: 2023-01-06

## 2022-09-23 NOTE — TELEPHONE ENCOUNTER
Routing refill request to provider for review/approval because:  Drug not on the [facility] refill protocol     Called pharmacy and patient has already picked up the refill of medication as well.

## 2022-09-26 ENCOUNTER — MEDICAL CORRESPONDENCE (OUTPATIENT)
Dept: HEALTH INFORMATION MANAGEMENT | Facility: CLINIC | Age: 52
End: 2022-09-26

## 2022-09-26 ENCOUNTER — TELEPHONE (OUTPATIENT)
Dept: INTERNAL MEDICINE | Facility: CLINIC | Age: 52
End: 2022-09-26

## 2022-09-26 NOTE — TELEPHONE ENCOUNTER
Fax received from [facility] 09/01/22 for review and signature.  Put in Davin Meredith in basket on Dr. Bain behalf.

## 2022-09-26 NOTE — TELEPHONE ENCOUNTER
Fax received from [facility] -  08/29/22 for review and signature.  Put in Davin Freeman in basket on Dr. Bain behalf.

## 2022-09-26 NOTE — TELEPHONE ENCOUNTER
Patient calls to ask for the name of her specific diet written in a letter form or on a script pad. She needs to provide this to [employer] in order for her to continue to get her food stamps.   She will be in 9- to pick it up as she is getting her flu vacc and a booster at the  Pharmacy.     In a recent PLAN OF CARE it is indicated she is on a Diabetic Diet.     Best number to call back [phone]

## 2022-09-26 NOTE — TELEPHONE ENCOUNTER
Fax received from [facility] 09/01/22 for review and signature.  Put in Davin Garcia's in basket on Dr. Maxwell's

## 2022-09-27 DIAGNOSIS — Z53.9 DIAGNOSIS NOT YET DEFINED: Primary | ICD-10-CM
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
## 2022-09-27 PROCEDURE — G0180 MD CERTIFICATION HHA PATIENT: HCPCS | Performed by: INTERNAL MEDICINE

## 2022-09-27 NOTE — TELEPHONE ENCOUNTER
I am unable to sign this form it needs an MD signature I have given it to Dr. Chandra.    Davin Garcia NP

## 2022-09-27 NOTE — TELEPHONE ENCOUNTER
With [place] out[facility]    Pls let TC know when this is completed.  I will call patient to let her know it can be picked up

## 2022-09-28 ENCOUNTER — IMMUNIZATION (OUTPATIENT)
Dept: INTERNAL MEDICINE | Facility: CLINIC | Age: 52
End: 2022-09-28
Payer: COMMERCIAL

## 2022-09-28 DIAGNOSIS — Z23 NEED FOR PROPHYLACTIC VACCINATION AND INOCULATION AGAINST INFLUENZA: ICD-10-CM

## 2022-09-28 DIAGNOSIS — Z23 ENCOUNTER FOR IMMUNIZATION: Primary | ICD-10-CM

## 2022-09-28 PROCEDURE — G0008 ADMIN INFLUENZA VIRUS VAC: HCPCS

## 2022-09-28 PROCEDURE — 99207 PR NO CHARGE NURSE ONLY: CPT

## 2022-09-28 PROCEDURE — 90682 RIV4 VACC RECOMBINANT DNA IM: CPT

## 2022-09-28 ASSESSMENT — PATIENT HEALTH QUESTIONNAIRE - PHQ9
SUM OF ALL RESPONSES TO PHQ QUESTIONS 1-9: 9
SUM OF ALL RESPONSES TO PHQ QUESTIONS 1-9: 9

## 2022-09-28 ASSESSMENT — ANXIETY QUESTIONNAIRES
1. FEELING NERVOUS, ANXIOUS, OR ON EDGE: NEARLY EVERY DAY
2. NOT BEING ABLE TO STOP OR CONTROL WORRYING: SEVERAL DAYS
IF YOU CHECKED OFF ANY PROBLEMS ON THIS QUESTIONNAIRE, HOW DIFFICULT HAVE THESE PROBLEMS MADE IT FOR YOU TO DO YOUR WORK, TAKE CARE OF THINGS AT HOME, OR GET ALONG WITH OTHER PEOPLE: SOMEWHAT DIFFICULT
3. WORRYING TOO MUCH ABOUT DIFFERENT THINGS: NEARLY EVERY DAY
6. BECOMING EASILY ANNOYED OR IRRITABLE: SEVERAL DAYS
7. FEELING AFRAID AS IF SOMETHING AWFUL MIGHT HAPPEN: SEVERAL DAYS
GAD7 TOTAL SCORE: 11
5. BEING SO RESTLESS THAT IT IS HARD TO SIT STILL: SEVERAL DAYS
4. TROUBLE RELAXING: SEVERAL DAYS
8. IF YOU CHECKED OFF ANY PROBLEMS, HOW DIFFICULT HAVE THESE MADE IT FOR YOU TO DO YOUR WORK, TAKE CARE OF THINGS AT HOME, OR GET ALONG WITH OTHER PEOPLE?: SOMEWHAT DIFFICULT
GAD7 TOTAL SCORE: 11
7. FEELING AFRAID AS IF SOMETHING AWFUL MIGHT HAPPEN: SEVERAL DAYS
GAD7 TOTAL SCORE: 11

## 2022-09-28 NOTE — TELEPHONE ENCOUNTER
I wrote a letter and saved it in patient chart. Let me know if that is sufficient or not.    Davin Garcia NP

## 2022-09-28 NOTE — TELEPHONE ENCOUNTER
Patient calls back. She state the form is for cholesterol and diabetes. She is having gastric sleeve surgery in November. This writer noticed the diet form was last filled out in May by Dr. Low and asked patient why they needed it again as normally it's good for a year. Patient states she has a new  and they need the form redone. She states diet has not changed since the last form was completed so could be done the same way. Previous form was scanned into chart on 7/19/22    Patient states there is also a new fax number this form needs to go to. New Fax #: [phone]. Please call with questions or she is coming to the clinic at 4:30 pm today for nurse only appointment and we could talk to her then if needed.     Brenda Franks RN  [facility]

## 2022-09-28 NOTE — TELEPHONE ENCOUNTER
Can I have more information regarding what special diet patient needs? I cannot find anything specific in chart. I see that she is looking to get a gastric sleeve surgery but cannot see if she has had this already.     Davin Garcia NP

## 2022-09-29 ENCOUNTER — VIRTUAL VISIT (OUTPATIENT)
Dept: PSYCHOLOGY | Facility: CLINIC | Age: 52
End: 2022-09-29
Attending: PHYSICIAN ASSISTANT
Payer: COMMERCIAL

## 2022-09-29 DIAGNOSIS — F41.9 ANXIETY: ICD-10-CM

## 2022-09-29 PROCEDURE — 99207 PR NO CHARGE LOS: CPT | Performed by: STUDENT IN AN ORGANIZED HEALTH CARE EDUCATION/TRAINING PROGRAM

## 2022-09-29 ASSESSMENT — PATIENT HEALTH QUESTIONNAIRE - PHQ9
10. IF YOU CHECKED OFF ANY PROBLEMS, HOW DIFFICULT HAVE THESE PROBLEMS MADE IT FOR YOU TO DO YOUR WORK, TAKE CARE OF THINGS AT HOME, OR GET ALONG WITH OTHER PEOPLE: SOMEWHAT DIFFICULT
SUM OF ALL RESPONSES TO PHQ QUESTIONS 1-9: 9

## 2022-09-29 ASSESSMENT — ANXIETY QUESTIONNAIRES: GAD7 TOTAL SCORE: 11

## 2022-09-29 NOTE — PROGRESS NOTES
Swetha contacted this writer from a private number and left a voicemail earlier this morning requesting a callback to [phone]. She said she was having phone difficulties. This is the number listed as her mobile phone.     This writer contacted that number but it was not working. Contacted other number listed ([phone]) and left generic voicemail. Sent [employer] message as well.     Around 11:15, this writer saw the scheduling note with a third number to contact Swetha ([phone]). Sarah answered. Introduced myself and Swetha and attempted to start the visit. Swetha explained that someone in her building hacked her phone and is preventing her from contacting [employer].  She has had to change her number, she said there will be an investigation into this. The connection was very poor and this writer could only hear some of what Swetha was saying. It took multiple attempts for her to communicate things.  She said she needed to figure out her phone situation and elected to not meet today. Informed her that this writer didn't have openings until end of October to reschedule. She agreed to this but again the connection was so poor we could not determine a time together.    Swetha presented with paranoia and anxiety during this brief interaction. Will reschedule appointment and meet with Swetha at next available to support her in preparation for surgery and adjustment post surgery.    Clinical observations and recommendations: Based on chart review, reviewing psychological assessment, and reason for referral this writer can meet with Swetha to enhance motivation for surgery, manage anxiety about the process before and after, and focus on eating and health behaviors. Swetha also has a history of bipolar disorder and consideration of borderline personality disorder vs. Histrionic personality disorder (per chart review) and these symptoms may be better managed with a provider who has more  availability to meet.  This writer can only meet with her every 3-4 weeks most likely. In her psychological assessment Dr. Hartman offered to meet with her from [facility] so this may be a good additional opportunity as he works with Swetha's prescribing provider.    Mercedes Whitfield, PhD,   Clinical Health Psychologist  Phone: [phone]  Pager: [phone]

## 2022-09-29 NOTE — TELEPHONE ENCOUNTER
Letter was read to patient over the phone and she agreed it was what she needed. Letter was faxed to the number she provided [phone] attn: Megan Kumar

## 2022-09-30 NOTE — TELEPHONE ENCOUNTER
LICE TREATMENT CREME RINSE 1% LIQD      Last Written Prescription Date:  4/7/22  Last Fill Quantity: 120 ml,   # refills: 1  Last Office Visit : 4/7/21  Future Office visit:  None scheduled    Routing refill request to provider for review/approval because:  Drug not active on patient's medication list

## 2022-10-03 RX ORDER — PERMETHRIN 1 G/100ML
LOTION TOPICAL
Qty: 120 ML | Refills: 1 | Status: SHIPPED | OUTPATIENT
Start: 2022-10-03 | End: 2023-11-15

## 2022-10-07 ENCOUNTER — NURSE TRIAGE (OUTPATIENT)
Dept: INTERNAL MEDICINE | Facility: CLINIC | Age: 52
End: 2022-10-07

## 2022-10-07 DIAGNOSIS — J44.1 COPD EXACERBATION (H): ICD-10-CM

## 2022-10-07 RX ORDER — LEVOFLOXACIN 500 MG/1
500 TABLET, FILM COATED ORAL DAILY
Qty: 7 TABLET | Refills: 0 | Status: SHIPPED | OUTPATIENT
Start: 2022-10-07 | End: 2022-10-20

## 2022-10-07 RX ORDER — PREDNISONE 20 MG/1
40 TABLET ORAL DAILY
Qty: 10 TABLET | Refills: 0 | Status: SHIPPED | OUTPATIENT
Start: 2022-10-07 | End: 2022-10-20

## 2022-10-07 NOTE — TELEPHONE ENCOUNTER
"Call to patient. Patient's father answered and states \"she has gone to the store\" and that she told him that he could take a message. Patient's father informed of Magaly's below response. Father verbalized understanding.     Jessica John RN BSN MSN  [facility]    "

## 2022-10-07 NOTE — TELEPHONE ENCOUNTER
" Nurse Triage SBAR    Is this a 2nd Level Triage? YES, LICENSED PRACTITIONER REVIEW IS REQUIRED    Situation: Patient is requesting a prescription for Levaquin and Prednisone for URI    Background: Patient used to get frequent respiratory infections due to COPD but states she stopped smoking at the beginning of the year and has not had a respiratory infection for months. Patient states Nona doesn't work for her.    Assessment: Patient got a COVID booster 9/27/22 and the flu shot 9/28/22. States 2 days later she got sick and has been sick since. States it is in her chest now. States she has been in bed for a week. Patient was coughing through out conversation. Denies fevers. Reports cough is mostly non productive with occasional greenish yellow sputum. States her lungs hurt from coughing. Patient states yesterday she fell when she tried to get up because \"her equilibrium is off\" and thinks she broke her right thumb. States thumb is red, hot and swollen to the wrist.    Protocol Recommended Disposition:   See in Office Today    Recommendation: Urgent care - Patient states she doesn't drive and doesn't feel well enough to get out of bed. States she doesn't have anyone who can drive her to an urgent care. When discussing her thumb injury patient states \"they aren't going to do anything for my thumb anyway\".     Routed to provider    Does the patient meet one of the following criteria for ADS visit consideration? 16+ years old, with an [facility] PCP     TIP  Providers, please consider if this condition is appropriate for management at one of our Acute and Diagnostic Services sites.     If patient is a good candidate, please use dotphrase <dot>triageresponse and select Refer to ADS to document.       Reason for Disposition    Known COPD or other severe lung disease (i.e., bronchiectasis, cystic fibrosis, lung surgery) and worsening symptoms (i.e., increased sputum purulence or amount, increased breathing " difficulty)    Additional Information    Negative: Bluish (or gray) lips or face    Negative: SEVERE difficulty breathing (e.g., struggling for each breath, speaks in single words)    Negative: Rapid onset of cough and has hives    Negative: Coughing started suddenly after medicine, an allergic food or bee sting    Negative: Difficulty breathing after exposure to flames, smoke, or fumes    Negative: Sounds like a life-threatening emergency to the triager    Negative: Previous asthma attacks and this feels like asthma attack    Negative: Dry cough (non-productive; no sputum or minimal clear sputum) and within 14 days of COVID-19 Exposure    Negative: MODERATE difficulty breathing (e.g., speaks in phrases, SOB even at rest, pulse 100-120) and still present when not coughing    Negative: Chest pain present when not coughing    Negative: Passed out (i.e., fainted, collapsed and was not responding)    Negative: Patient sounds very sick or weak to the triager    Negative: MILD difficulty breathing (e.g., minimal/no SOB at rest, SOB with walking, pulse <100) and still present when not coughing    Negative: Coughed up > 1 tablespoon (15 ml) blood (Exception: Blood-tinged sputum.)    Negative: Fever > 103 F (39.4 C)    Negative: Fever > 101 F (38.3 C) and over 60 years of age    Negative: Fever > 100.0 F (37.8 C) and has diabetes mellitus or a weak immune system (e.g., HIV positive, cancer chemotherapy, organ transplant, splenectomy, chronic steroids)    Negative: Fever > 100.0 F (37.8 C) and bedridden (e.g., nursing home patient, stroke, chronic illness, recovering from surgery)    Negative: Increasing ankle swelling    Negative: Wheezing is present    Negative: SEVERE coughing spells (e.g., whooping sound after coughing, vomiting after coughing)    Negative: Coughing up keyla-colored (reddish-brown) or blood-tinged sputum    Negative: Fever present > 3 days (72 hours)    Negative: Fever returns after gone for over 24 hours  and symptoms worse or not improved    Negative: Using nasal washes and pain medicine > 24 hours and sinus pain persists    Protocols used: COUGH-A-OH

## 2022-10-10 ENCOUNTER — E-VISIT (OUTPATIENT)
Dept: INTERNAL MEDICINE | Facility: CLINIC | Age: 52
End: 2022-10-10
Payer: COMMERCIAL

## 2022-10-10 DIAGNOSIS — N39.0 ACUTE UTI (URINARY TRACT INFECTION): Primary | ICD-10-CM

## 2022-10-10 PROCEDURE — 99421 OL DIG E/M SVC 5-10 MIN: CPT | Performed by: NURSE PRACTITIONER

## 2022-10-10 RX ORDER — NITROFURANTOIN 25; 75 MG/1; MG/1
100 CAPSULE ORAL 2 TIMES DAILY
Qty: 10 CAPSULE | Refills: 0 | Status: SHIPPED | OUTPATIENT
Start: 2022-10-10 | End: 2022-10-15

## 2022-10-10 NOTE — PATIENT INSTRUCTIONS
Dear Swetha Patterson    After reviewing your responses, I've been able to diagnose you with a urinary tract infection, which is a common infection of the bladder with bacteria.  This is not a sexually transmitted infection, though urinating immediately after intercourse can help prevent infections.  Drinking lots of fluids is also helpful to clear your current infection and prevent the next one.      I have sent a prescription for antibiotics to your pharmacy to treat this infection.    It is important that you take all of your prescribed medication even if your symptoms are improving after a few doses.  Taking all of your medicine helps prevent the symptoms from returning.     If your symptoms worsen, you develop pain in your back or stomach, develop fevers, or are not improving in 5 days, please contact your primary care provider for an appointment or visit any of our convenient Walk-in or Urgent Care Centers to be seen, which can be found on our website here.    Thanks again for choosing us as your health care partner,    LEIGH Long CNP    Urinary Tract Infections in Women  Urinary tract infections (UTIs) are most often caused by bacteria. These bacteria enter the urinary tract. The bacteria may come from inside the body. Or they may travel from the skin outside the rectum or vagina into the urethra. Female anatomy makes it easy for bacteria from the bowel to enter a woman s urinary tract, which is the most common source of UTI. This means women develop UTIs more often than men. Pain in or around the urinary tract is a common UTI symptom. But the only way to know for sure if you have a UTI for the healthcare provider to test your urine. The two tests that may be done are the urinalysis and urine culture.     Types of UTIs    Cystitis. A bladder infection (cystitis) is the most common UTI in women. You may have urgent or frequent need to pee. You may also have pain, burning when you pee, and  bloody urine.    Urethritis. This is an inflamed urethra, which is the tube that carries urine from the bladder to outside the body. You may have lower stomach or back pain. You may also have urgent or frequent need to pee.    Pyelonephritis. This is a kidney infection. If not treated, it can be serious and damage your kidneys. In severe cases, you may need to stay in the hospital. You may have a fever and lower back pain.    Medicines to treat a UTI  Most UTIs are treated with antibiotics. These kill the bacteria. The length of time you need to take them depends on the type of infection. It may be as short as 3 days. If you have repeated UTIs, you may need a low-dose antibiotic for several months. Take antibiotics exactly as directed. Don t stop taking them until all of the medicine is gone. If you stop taking the antibiotic too soon, the infection may not go away. You may also develop a resistance to the antibiotic. This can make it much harder to treat.   Lifestyle changes to treat and prevent UTIs   The lifestyle changes below will help get rid of your UTI. They may also help prevent future UTIs.     Drink plenty of fluids. This includes water, juice, or other caffeine-free drinks. Fluids help flush bacteria out of your body.    Empty your bladder. Always empty your bladder when you feel the urge to pee. And always pee before going to sleep. Urine that stays in your bladder can lead to infection. Try to pee before and after sex as well.    Practice good personal hygiene. Wipe yourself from front to back after using the toilet. This helps keep bacteria from getting into the urethra.    Use condoms during sex. These help prevent UTIs caused by sexually transmitted bacteria. Also don't use spermicides during sex. These can increase the risk for UTIs. Choose other forms of birth control instead. For women who tend to get UTIs after sex, a low-dose of a preventive antibiotic may be used. Be sure to discuss this  option with your healthcare provider.    Follow up with your healthcare provider as directed. He or she may test to make sure the infection has cleared. If needed, more treatment may be started.  Jorge A last reviewed this educational content on 7/1/2019 2000-2021 [employer]. All rights reserved. This information is not intended as a substitute for professional medical care. Always follow your healthcare professional's instructions.

## 2022-10-11 ENCOUNTER — VIRTUAL VISIT (OUTPATIENT)
Dept: ENDOCRINOLOGY | Facility: CLINIC | Age: 52
End: 2022-10-11
Payer: COMMERCIAL

## 2022-10-11 DIAGNOSIS — Z71.3 NUTRITIONAL COUNSELING: Primary | ICD-10-CM

## 2022-10-11 DIAGNOSIS — E66.812 OBESITY, CLASS II, BMI 35-39.9: ICD-10-CM

## 2022-10-11 PROCEDURE — 97803 MED NUTRITION INDIV SUBSEQ: CPT | Mod: 93 | Performed by: DIETITIAN, REGISTERED

## 2022-10-11 NOTE — LETTER
"10/11/2022       RE: Swetha Patterson  [address]   [address] [place] [id number]     Dear Colleague,    Thank you for referring your patient, Swetha Patterson, to the [facility] [place] at [facility]. Please see a copy of my visit note below.    Swetha Patterson is a 52 year old female who is being evaluated via a billable telephone visit.     The patient has been notified of following:     \"This telephone visit will be conducted via a call between you and your physician/provider. We have found that certain health care needs can be provided without the need for a physical exam.  This service lets us provide the care you need with a short phone conversation.  If a prescription is necessary we can send it directly to your pharmacy.  If lab work is needed we can place an order for that and you can then stop by our lab to have the test done at a later time.    Telephone visits are billed at different rates depending on your insurance coverage. During this emergency period, for some insurers they may be billed the same as an in-person visit.  Please reach out to your insurance provider with any questions.    If during the course of the call the physician/provider feels a telephone visit is not appropriate, you will not be charged for this service.\"    Patient has given verbal consent for Telephone visit?  Yes    Phone call duration: 39 minutes    During this virtual visit the patient is located in [place], patient verifies this as the location during the entirety of this visit.       Bariatric Nutrition Consultation Note    Reason For Visit: Nutrition Assessment    Swetha Patterson is a 51 year old presenting today for return bariatric nutrition consult.   Pt is interested in laparoscopic sleeve gastrectomy.    Pt has met the minimum required RD appointments but does need formal post op education once she has a surgery date.     Pt referred by " "NOE Blake on January 20, 2022.    Coordination note:  Needs letter of support from therapist and psychiatrist  - Started seeing Mercedes Whitfield 9/29  Needs clearance from sleep medicine - In progress, had to reschedule sleep study due to being sick  Needs PCP letter of support - Done per pt.   Surgeon meet and greet with Dr. Leach - Done 6/9/22   No nicotine for 3 months prior to surgery - Working on    Needs Psych eval  - On file 7/6/22 from Dr. Hartman  Sobriety from alcohol for 1 year - May 2022 - Met  Needs baseline labs - Done 1/24/22  10 lb weight loss from initial - Met    Patient with Co-morbidities of obesity including:  Type II DM no but does have pre-diabetes, last A1C 5.8 on 1/24/22  Renal Failure no  Sleep apnea yes  Hypertension yes  Dyslipidemia yes  Joint pain no  Back pain no  GERD no     PMH: fatty liver (nonalcoholic per pt), fibromyalgia, cholecystomy, asthma    Support System Reviewed With Patient 1/19/2022   Who do you have in your support network that can be available to help you for the first 2 weeks after surgery? I have my sister Rukhsana a very close friend of keri Mckinley and I have my dad and I also have psychiatrist my sister Rukhsana Kearns my dad and my psychiatrist again Rukhsana Milian   Who can you count on for support throughout your weight loss surgery journey? Rukhsana Kearns my dad my psychiatrist       ANTHROPOMETRICS:  Consult weight 1/20/22: 247 lbs with BMI 43.75    Estimated body mass index is 38.09 kg/m  as calculated from the following:    Height as of 8/17/22: 1.6 m (5' 3\").    Weight as of 9/8/22: 97.5 kg (215 lb).     Current weight: 212-216 lbs, pt report    Required weight loss goal pre-op: 10 lbs from initial consult weight (goal weight 237 lbs or less before surgery)       1/19/2022   I have tried the following methods to lose weight Watching portions or calories, Exercise, Physician directed program       Weight Loss Questions Reviewed With " Patient 1/19/2022   How long have you been overweight? From Middle age and beyond     MEDICATIONS FOR WEIGHT LOSS:  Semaglutide  Naltrexone      Also takes Wellbutrin    SUPPLEMENT INFORMATION:  Vit D 5000 IUS/ MWF  Got a MVI with iron (non-chewable)     Vit D WNL 1/24/22, hx of vit d def    Prefers oral B12 after surgery - does not like needles    NUTRITION HISTORY:  NKFA  Possible lactose intolerance     Pt s friend had sleeve surgery - is familiar with some of it through her. Went well for friend.    Nicotine: Smoked for ~40+ years per pt. Quit cigarettes two weeks ago, using patch and gum. Refer to MTM. None for 3 months prior to surgery    Alcohol: hx of abuse. Sober since May 2021. No surgery until May 2022    Please see previous RD note for more detail.    March 2022:    Discussed task list for surgery.     Doing well with dietary goals  ? Chewing food well (counting to 15-20)  ? Smaller portions   ? Continues to be alcohol free  ? Continues to drink slower   ?  fluids from meal time for most part (might have a sip here/there) - at about 20 minutes with none right now    Protein sources: pb, yogurt, cheese, chicken and turkey     Recent recall:  - Banana  - Mixed fruit  - Cheese   - 2 Tbsp pb on celery   - Vegetable Soup   - 1 slice of pepperoni pizza     PA: walking lots    April 2022:    Pt shared she has not had a BM since Friday. Thinks it is related to the Rybelsus (Semglatuide) she recently started. She is hoping to stop the medication. Scared to eat right now due to being backed up.     Didn t eat anything yesterday - today only had two pickles thus far.      Continues to do well with dietary goals and weight loss. However, pt is struggling with changes in her body and  saggy skin . Pt concerned about how she will look going forward. She is not sure how to proceed right now - hesitant but also not wanting to give up.    Has found that if she overeats she will vomit - stomach is helping her  know when to stop.     May 2022:  Was struggling with night eating - better now. Increased dose of medication and none since.    Continues to do well with goals -  fluids during meal time, chewing well, setting utensil down, making good food choices.    Has practiced eating on smaller plates (saucer size)    Doing well resisting temptations.     Fluids: water, 1 coke zero/day, V8 juice occ, sugar-free flavorings in water    BM - was constipated but made some changes (more fiber, etc)     Nicotine: none - was using patches but stopped     PA: walking or moving around house    June 2022:    Really sick - bed for 3 days. Throws up anytime she tries to get up. Can t hold any food down - trying to stay hydrated. Lost 5 lbs. At home nurse came yesterday - said temperature was only 85 degrees. Nurse checked 3x - both temples and wrists. Then went up to 95. Temp today is 99.1. Checking temp about every 4 hrs. Checked for covid - negative.     Ate some toast today and munching on crackers. Plans to try ramen noodles next. Also doing popsicles. Also doing some electrolyte water. Recommended she try some Ensure.     ? Cut out all carbonated beverages.  ? Continues with smaller portions.   ? Bought some protein shakes for after surgery (Ensure Max protein).   ? No alcohol.  ? No nicotine  ?  fluids from meal and none for up to 20 min after (working towards 30 min)    Normal breakfast before getting sick was egg whites. Not keeping sweets in house. Eating well per pt - will grab a yogurt if craving something sweet.     Weight was up to 230 lbs, noticed an increase in cravings. Started Naltrexone. Now down to 216 (5 lb loss since being sick).     Lab appt scheduled for 7/18. Stopped using nicotine patches right after seeing Rashmi 4/27/22    Recently got an exercise bike from a friend.    July 2022:  Going on vacation for 3 weeks. Leaving later today. Heading to [place]     Continues with goals  ? Lots of  vegetables  ? Leaner protein sources (turkey as opposed to beef)  ? Smaller portions  ? No alcohol   ? No smoking (is exposed to second hand smoke from friends/dad)  ?  fluids from meal time  ? Staying well hydrated   ? No pop  ? No juices     Tried milk and bread - did not sit well per pt.     Recent recall:  ? Mixed vegetables  ? 1/2 turkey burger (no bun)  ? Brussel sprouts   ? Yogurt   ? Orange   ? Water    Going to start working with Mercedes Whitfield (first appt 9/29/22)    Plans to look for a MVI with iron this month. Re-reviewed needs.    Pt reports mental health is good - only taking clonazepam 1x/day (use to be 2x). No depression, some anxiety but mostly due to excitement for surgery.     PA: lots of walking, got a foot exercise thing as well  Bought some small dumbells for after surgery.    August 2022:  Lots of stress this past month. Higher cravings, hard to control. Also noticing lots of anxiety - not sure about surgery but wants to continue with process.    Had 2 cans of pop and a couple cookies- feels self slipping back into old dietary habits.     Going for walks to help. Plans to get a gym membership.    Has continued to separate fluids (up to 10 minutes post meals currently) and work on other dietary goals (portion control). If wanting a snack tries to have cottage cheese or yogurt.    B - 1/2 packet of oatmeal   L - deli meat with raw vegetable on side  D - varies    Also having lots of issues with hernia - pain and getting sick. Hernia is getting bigger per pt. Hard to keep food down. Lots of reflux. Taking meds but not helping per pt. Seeing PCP 8/17/22.     Sept 2022:  Pt saw PCP last month for concerns regarding symptoms likely related to her hernia.   Checked labs - TSH was high, started medication.      Doing well with dietary goals overall per pt but struggling with giving up pop (having headaches).      Intake unclear.   Likely not getting enough protein - craving meat.      Chewing  improving - got teeth yesterday. Had partials before.      PA: walking lots, limited. still having pain with hernia, can t bend over fully.     Oct 2022:  Got sick last month following flu/covid vaccines - vomiting. Also got a UTI and a respiratory infection. Then fell and hurt her thumb. Was on steroids, gained some weight and was eating more.    Doing lots of soup. Adding frozen vegetables.  Or having 1/4 egg salad sandwich.   If having a burger will eat 1/4 of it.  Snacking on grapes, celery and pb, cottage cheese and yogurt.  Doing Ensure occ to help supplement protein intake.     Turned off by pork - found a worm in her pork last month.     Drinking lots of water. No longer drinking pop. Continues to be alcohol free.  fluids from meal time and for 15 minutes post meals.    Nervous about nicotine test - she is nicotine free but her father is smoking 1-2 packs/day in house. Trying to avoid second hand smoke.     PA: walking daily    Recall Diet Questions Reviewed With Patient 1/19/2022   Describe what you typically consume for breakfast (typical or most recent): I usually don't eat much for breakfast maybe a piece of toast.   Describe what you typically consume for lunch (typical or most recent): Sometimes hot dogs or macaroni and cheese or hamburgers or fast food or eggs or just about anything   Describe what you typically consume for supper (typical or most recent): Always have to have meat with my dinner potato vegetable if it's corn or green beans but I prefer peas and then I snack at night time before bed because I'm so hungry   Describe what you typically consume as snacks (typical or most recent): Cereal bars chips sometimes fruit a hamburger fast food   How many ounces of water, or other low calorie drinks, do you drink daily (8 oz=1 glass)? 48 oz   How many ounces of caffeine (coffee, tea, pop) do you drink daily (8 oz=1 glass)? 8 oz   How many ounces of carbonated (pop, beer, sparkling water)  drinks do you drinky daily (8 oz=1 glass)? 8 oz   How many ounces of juice, pop, sweet tea, sports drinks, protein drinks, other sweetened drinks, do you drink daily (8 oz=1 glass)? 24 oz   How many ounces of milk do you drink daily (8 oz=1 glass) 8 oz   Please indicate the type of milk: 2%   How often do you drink alcohol? Never   If you do drink alcohol, how many drinks might you have in a day? (one drink = 5 oz. wine, 1 can/bottle of beer, 1 shot liquor) -       Eating Habits 1/19/2022   Do you have any dietary restrictions? Yes   Do you currently binge eat (eat a large amount of food in a short time)? -   Are you an emotional eater? No   Do you get up to eat after falling asleep? Yes   What foods do you crave? I crave Pizza macaroni and cheese [employer] hamburger french fries but I do still eat my vegetables     Dining Out History Reviewed With Patient 1/19/2022   How often do you dine out? A couple of times a week.   Where do you dine out? (select all that apply) fast food chains, take out   What types of food do you order when you dine out? Some type of meat potato and of course you got to have a green beans or corn       Physical Activity Reviewed With Patient 2/22/2022   How often do you exercise? Daily   What is the duration of your exercise (in minutes)? 30 Minutes   What types of exercise do you do? walking, other   What keeps you from being more active?  I am as active as I can possbily be       NUTRITION DIAGNOSIS:  Obesity r/t long history of positive energy balance aeb BMI >30 kg/m2.    INTERVENTION:  Intervention Provided/Education Provided on post-op diet guidelines, vitamins/minerals essential post-operatively, GI anatomy of bariatric surgeries, ways to help prepare for post-op diet guidelines pre-operatively, portion/calorie-control, mindful eating and sources of protein.  Patient demonstrates understanding. Provided pt with list of goals RD contact information.      Questions Reviewed With Patient  1/19/2022   How ready are you to make changes regarding your weight? Number 1 = Not ready at all to make changes up to 10 = very ready. 10   How confident are you that you can change? 1 = Not confident that you will be successful making changes up to 10 = very confident. 10       Expected Engagement: good    GOALS:  1. Consider making soups with bone broth to increase protein.      2. Keep up the great work!               - No alcohol              - No smoking              - Seperating fluids from meal time               - Sipping fluids              - Chewing/pace              - No carbonation after surgery     3.. Will need 60 grams of protein/day minimum after surgery    Non-meat Protein Ideas    Quinoa    Eggs    Nuts    Beans    Lentils    Protein pasta     Nutritional yeast    Garden of life raw meal powder    Liquid aminos    Homemade meats - a taco meat could be made with chopped cauliflower/mushroom as an example     [facility] hearts      Vegetarian Meal Blog  [email or web address]     High Fiber Foods:  Bran cereal, 1/3 cup, 8.6 gm fiber   Cooked kidney beans   cup 7.9 gm  Cooked lentils   cup 7.8 gm  Cooked black beans   cup 7.6 gm  Canned chickpeas   cup 5.3 gm  Baked beans   cup 5.2 gm  Pear 1 5.1 gm  Soybeans   cup 5.1 gm  Quinoa   cup 5 gm  Baked sweet potato, with skin 1 medium 4.8 gm  Baked potato, with skin 1 medium 4.4 gm  Cooked frozen green peas   cup 4.4 gm  Bulgur   cup 4.1 gm  Cooked frozen mixed vegetables   cup 4 gm  Raspberries   cup 4 gm  Blackberries   cup 3.8 gm  Almonds 1 oz 3.5 gm  Cooked frozen spinach   cup 3.5 gm  Vegetable or soy yony 1 each 3.4 gm  Apple 1 medium 3.3 gm  Dried dates 5 pieces 3.3 gm    Surgery resources:  Diet Guidelines after Weight Loss Surgery  [email or web address]     Post-op Diet Advancement Schedule:  Clear Liquid Diet (stage 1):  TBD   *No RED, PROTEIN or PULP day before surgery  Low-Fat Full Liquid Diet (stage 2):  TBD (2 weeks total between clear/liquid)  Pureed Diet (stage 3): TBD (2 weeks)  Soft Diet (stage 4): TBD (4 weeks)  Regular Diet (stage 5): TBD    Your Stage 1 Diet: Clear Liquids  [email or web address]     Your Stage 2 Diet: Low-fat Full Liquids  [email or web address]     Your Stage 3 Diet: Pureed Foods  [email or web address]     Pureed Recipes  [email or web address]    Your Stage 4 Diet: Soft Foods  [email or web address]    Your Stage 5 Diet: Regular Foods  [email or web address]    Follow up:   Tuesday, November 8th at 2:30 pm    Time spent with patient: 39 minutes.  SHELLY Neal, RD, LD

## 2022-10-11 NOTE — Clinical Note
Please mail pt her AVS. She would also like some Ensure coupons. Marisabel/Sally/Celia all know where they are and can help find them if needed. We keep them with the food samples in [address] closet

## 2022-10-11 NOTE — PROGRESS NOTES
"Swetha Patterson is a 52 year old female who is being evaluated via a billable telephone visit.     The patient has been notified of following:     \"This telephone visit will be conducted via a call between you and your physician/provider. We have found that certain health care needs can be provided without the need for a physical exam.  This service lets us provide the care you need with a short phone conversation.  If a prescription is necessary we can send it directly to your pharmacy.  If lab work is needed we can place an order for that and you can then stop by our lab to have the test done at a later time.    Telephone visits are billed at different rates depending on your insurance coverage. During this emergency period, for some insurers they may be billed the same as an in-person visit.  Please reach out to your insurance provider with any questions.    If during the course of the call the physician/provider feels a telephone visit is not appropriate, you will not be charged for this service.\"    Patient has given verbal consent for Telephone visit?  Yes    Phone call duration: 39 minutes    During this virtual visit the patient is located in [place], patient verifies this as the location during the entirety of this visit.       Bariatric Nutrition Consultation Note    Reason For Visit: Nutrition Assessment    Swetha Patterson is a 51 year old presenting today for return bariatric nutrition consult.   Pt is interested in laparoscopic sleeve gastrectomy.    Pt has met the minimum required RD appointments but does need formal post op education once she has a surgery date.     Pt referred by NOE Blake on January 20, 2022.    Coordination note:  Needs letter of support from therapist and psychiatrist  - Started seeing Mercedes Whitfield 9/29  Needs clearance from sleep medicine - In progress, had to reschedule sleep study due to being sick  Needs PCP letter of support - Done per pt.   Surgeon meet and greet with " "Dr. Leach - Done 6/9/22   No nicotine for 3 months prior to surgery - Working on    Needs Psych eval  - On file 7/6/22 from Dr. Hartman  Sobriety from alcohol for 1 year - May 2022 - Met  Needs baseline labs - Done 1/24/22  10 lb weight loss from initial - Met    Patient with Co-morbidities of obesity including:  Type II DM no but does have pre-diabetes, last A1C 5.8 on 1/24/22  Renal Failure no  Sleep apnea yes  Hypertension yes  Dyslipidemia yes  Joint pain no  Back pain no  GERD no     PMH: fatty liver (nonalcoholic per pt), fibromyalgia, cholecystomy, asthma    Support System Reviewed With Patient 1/19/2022   Who do you have in your support network that can be available to help you for the first 2 weeks after surgery? I have my sister Rukhsana a very close friend of keri Mckinley and I have my dad and I also have psychiatrist my sister Rukhsana Curtismy my dad and my psychiatrist again Rukhsana Milian   Who can you count on for support throughout your weight loss surgery journey? Rukhsana Haynesgurwinder Kearns my dad my psychiatrist       ANTHROPOMETRICS:  Consult weight 1/20/22: 247 lbs with BMI 43.75    Estimated body mass index is 38.09 kg/m  as calculated from the following:    Height as of 8/17/22: 1.6 m (5' 3\").    Weight as of 9/8/22: 97.5 kg (215 lb).     Current weight: 212-216 lbs, pt report    Required weight loss goal pre-op: 10 lbs from initial consult weight (goal weight 237 lbs or less before surgery)       1/19/2022   I have tried the following methods to lose weight Watching portions or calories, Exercise, Physician directed program       Weight Loss Questions Reviewed With Patient 1/19/2022   How long have you been overweight? From Middle age and beyond     MEDICATIONS FOR WEIGHT LOSS:  Semaglutide  Naltrexone      Also takes Wellbutrin    SUPPLEMENT INFORMATION:  Vit D 5000 IUS/ MWF  Got a MVI with iron (non-chewable)     Vit D WNL 1/24/22, hx of vit d def    Prefers oral B12 after surgery - does " not like needles    NUTRITION HISTORY:  NKFA  Possible lactose intolerance     Pt s friend had sleeve surgery - is familiar with some of it through her. Went well for friend.    Nicotine: Smoked for ~40+ years per pt. Quit cigarettes two weeks ago, using patch and gum. Refer to [facility]. None for 3 months prior to surgery    Alcohol: hx of abuse. Sober since May 2021. No surgery until May 2022    Please see previous RD note for more detail.    March 2022:    Discussed task list for surgery.     Doing well with dietary goals  ? Chewing food well (counting to 15-20)  ? Smaller portions   ? Continues to be alcohol free  ? Continues to drink slower   ?  fluids from meal time for most part (might have a sip here/there) - at about 20 minutes with none right now    Protein sources: pb, yogurt, cheese, chicken and turkey     Recent recall:  - Banana  - Mixed fruit  - Cheese   - 2 Tbsp pb on celery   - Vegetable Soup   - 1 slice of pepperoni pizza     PA: walking lots    April 2022:    Pt shared she has not had a BM since Friday. Thinks it is related to the Rybelsus (Semglatuide) she recently started. She is hoping to stop the medication. Scared to eat right now due to being backed up.     Didn t eat anything yesterday - today only had two pickles thus far.      Continues to do well with dietary goals and weight loss. However, pt is struggling with changes in her body and  saggy skin . Pt concerned about how she will look going forward. She is not sure how to proceed right now - hesitant but also not wanting to give up.    Has found that if she overeats she will vomit - stomach is helping her know when to stop.     May 2022:  Was struggling with night eating - better now. Increased dose of medication and none since.    Continues to do well with goals -  fluids during meal time, chewing well, setting utensil down, making good food choices.    Has practiced eating on smaller plates (saucer size)    Doing well  resisting temptations.     Fluids: water, 1 coke zero/day, V8 juice occ, sugar-free flavorings in water    BM - was constipated but made some changes (more fiber, etc)     Nicotine: none - was using patches but stopped     PA: walking or moving around house    June 2022:    Really sick - bed for 3 days. Throws up anytime she tries to get up. Can t hold any food down - trying to stay hydrated. Lost 5 lbs. At home nurse came yesterday - said temperature was only 85 degrees. Nurse checked 3x - both temples and wrists. Then went up to 95. Temp today is 99.1. Checking temp about every 4 hrs. Checked for covid - negative.     Ate some toast today and munching on crackers. Plans to try ramen noodles next. Also doing popsicles. Also doing some electrolyte water. Recommended she try some Ensure.     ? Cut out all carbonated beverages.  ? Continues with smaller portions.   ? Bought some protein shakes for after surgery (Ensure Max protein).   ? No alcohol.  ? No nicotine  ?  fluids from meal and none for up to 20 min after (working towards 30 min)    Normal breakfast before getting sick was egg whites. Not keeping sweets in house. Eating well per pt - will grab a yogurt if craving something sweet.     Weight was up to 230 lbs, noticed an increase in cravings. Started Naltrexone. Now down to 216 (5 lb loss since being sick).     Lab appt scheduled for 7/18. Stopped using nicotine patches right after seeing Rashmi 4/27/22    Recently got an exercise bike from a friend.    July 2022:  Going on vacation for 3 weeks. Leaving later today. Heading to [place]     Continues with goals  ? Lots of vegetables  ? Leaner protein sources (turkey as opposed to beef)  ? Smaller portions  ? No alcohol   ? No smoking (is exposed to second hand smoke from friends/dad)  ?  fluids from meal time  ? Staying well hydrated   ? No pop  ? No juices     Tried milk and bread - did not sit well per pt.     Recent recall:  ? Mixed  vegetables  ? 1/2 turkey burger (no bun)  ? Brussel sprouts   ? Yogurt   ? Orange   ? Water    Going to start working with Mercedes Whitfield (first appt 9/29/22)    Plans to look for a MVI with iron this month. Re-reviewed needs.    Pt reports mental health is good - only taking clonazepam 1x/day (use to be 2x). No depression, some anxiety but mostly due to excitement for surgery.     PA: lots of walking, got a foot exercise thing as well  Bought some small dumbells for after surgery.    August 2022:  Lots of stress this past month. Higher cravings, hard to control. Also noticing lots of anxiety - not sure about surgery but wants to continue with process.    Had 2 cans of pop and a couple cookies- feels self slipping back into old dietary habits.     Going for walks to help. Plans to get a gym membership.    Has continued to separate fluids (up to 10 minutes post meals currently) and work on other dietary goals (portion control). If wanting a snack tries to have cottage cheese or yogurt.    B - 1/2 packet of oatmeal   L - deli meat with raw vegetable on side  D - varies    Also having lots of issues with hernia - pain and getting sick. Hernia is getting bigger per pt. Hard to keep food down. Lots of reflux. Taking meds but not helping per pt. Seeing PCP 8/17/22.     Sept 2022:  Pt saw PCP last month for concerns regarding symptoms likely related to her hernia.   Checked labs - TSH was high, started medication.      Doing well with dietary goals overall per pt but struggling with giving up pop (having headaches).      Intake unclear.   Likely not getting enough protein - craving meat.      Chewing improving - got teeth yesterday. Had partials before.      PA: walking lots, limited. still having pain with hernia, can t bend over fully.     Oct 2022:  Got sick last month following flu/covid vaccines - vomiting. Also got a UTI and a respiratory infection. Then fell and hurt her thumb. Was on steroids, gained some weight and  was eating more.    Doing lots of soup. Adding frozen vegetables.  Or having 1/4 egg salad sandwich.   If having a burger will eat 1/4 of it.  Snacking on grapes, celery and pb, cottage cheese and yogurt.  Doing Ensure occ to help supplement protein intake.     Turned off by pork - found a worm in her pork last month.     Drinking lots of water. No longer drinking pop. Continues to be alcohol free.  fluids from meal time and for 15 minutes post meals.    Nervous about nicotine test - she is nicotine free but her father is smoking 1-2 packs/day in house. Trying to avoid second hand smoke.     PA: walking daily    Recall Diet Questions Reviewed With Patient 1/19/2022   Describe what you typically consume for breakfast (typical or most recent): I usually don't eat much for breakfast maybe a piece of toast.   Describe what you typically consume for lunch (typical or most recent): Sometimes hot dogs or macaroni and cheese or hamburgers or fast food or eggs or just about anything   Describe what you typically consume for supper (typical or most recent): Always have to have meat with my dinner potato vegetable if it's corn or green beans but I prefer peas and then I snack at night time before bed because I'm so hungry   Describe what you typically consume as snacks (typical or most recent): Cereal bars chips sometimes fruit a hamburger fast food   How many ounces of water, or other low calorie drinks, do you drink daily (8 oz=1 glass)? 48 oz   How many ounces of caffeine (coffee, tea, pop) do you drink daily (8 oz=1 glass)? 8 oz   How many ounces of carbonated (pop, beer, sparkling water) drinks do you drinky daily (8 oz=1 glass)? 8 oz   How many ounces of juice, pop, sweet tea, sports drinks, protein drinks, other sweetened drinks, do you drink daily (8 oz=1 glass)? 24 oz   How many ounces of milk do you drink daily (8 oz=1 glass) 8 oz   Please indicate the type of milk: 2%   How often do you drink alcohol?  Never   If you do drink alcohol, how many drinks might you have in a day? (one drink = 5 oz. wine, 1 can/bottle of beer, 1 shot liquor) -       Eating Habits 1/19/2022   Do you have any dietary restrictions? Yes   Do you currently binge eat (eat a large amount of food in a short time)? -   Are you an emotional eater? No   Do you get up to eat after falling asleep? Yes   What foods do you crave? I crave Pizza macaroni and cheese [employer] hamburger french fries but I do still eat my vegetables     Dining Out History Reviewed With Patient 1/19/2022   How often do you dine out? A couple of times a week.   Where do you dine out? (select all that apply) fast food chains, take out   What types of food do you order when you dine out? Some type of meat potato and of course you got to have a green beans or corn       Physical Activity Reviewed With Patient 2/22/2022   How often do you exercise? Daily   What is the duration of your exercise (in minutes)? 30 Minutes   What types of exercise do you do? walking, other   What keeps you from being more active?  I am as active as I can possbily be       NUTRITION DIAGNOSIS:  Obesity r/t long history of positive energy balance aeb BMI >30 kg/m2.    INTERVENTION:  Intervention Provided/Education Provided on post-op diet guidelines, vitamins/minerals essential post-operatively, GI anatomy of bariatric surgeries, ways to help prepare for post-op diet guidelines pre-operatively, portion/calorie-control, mindful eating and sources of protein.  Patient demonstrates understanding. Provided pt with list of goals RD contact information.      Questions Reviewed With Patient 1/19/2022   How ready are you to make changes regarding your weight? Number 1 = Not ready at all to make changes up to 10 = very ready. 10   How confident are you that you can change? 1 = Not confident that you will be successful making changes up to 10 = very confident. 10       Expected Engagement: good    GOALS:  1.  Consider making soups with bone broth to increase protein.      2. Keep up the great work!               - No alcohol              - No smoking              - Seperating fluids from meal time               - Sipping fluids              - Chewing/pace              - No carbonation after surgery     3.. Will need 60 grams of protein/day minimum after surgery    Non-meat Protein Ideas    Quinoa    Eggs    Nuts    Beans    Lentils    Protein pasta     Nutritional yeast    Garden of life raw meal powder    Liquid aminos    Homemade meats - a taco meat could be made with chopped cauliflower/mushroom as an example     Hemp hearts      Vegetarian Meal Blog  [email or web address]     High Fiber Foods:  Bran cereal, 1/3 cup, 8.6 gm fiber   Cooked kidney beans   cup 7.9 gm  Cooked lentils   cup 7.8 gm  Cooked black beans   cup 7.6 gm  Canned chickpeas   cup 5.3 gm  Baked beans   cup 5.2 gm  Pear 1 5.1 gm  Soybeans   cup 5.1 gm  Quinoa   cup 5 gm  Baked sweet potato, with skin 1 medium 4.8 gm  Baked potato, with skin 1 medium 4.4 gm  Cooked frozen green peas   cup 4.4 gm  Bulgur   cup 4.1 gm  Cooked frozen mixed vegetables   cup 4 gm  Raspberries   cup 4 gm  Blackberries   cup 3.8 gm  Almonds 1 oz 3.5 gm  Cooked frozen spinach   cup 3.5 gm  Vegetable or soy yony 1 each 3.4 gm  Apple 1 medium 3.3 gm  Dried dates 5 pieces 3.3 gm    Surgery resources:  Diet Guidelines after Weight Loss Surgery  [email or web address]     Post-op Diet Advancement Schedule:  Clear Liquid Diet (stage 1):  TBD   *No RED, PROTEIN or PULP day before surgery  Low-Fat Full Liquid Diet (stage 2): TBD (2 weeks total between clear/liquid)  Pureed Diet (stage 3): TBD (2 weeks)  Soft Diet (stage 4): TBD (4 weeks)  Regular Diet (stage 5): TBD    Your Stage 1 Diet: Clear Liquids  [email or web address]     Your Stage 2 Diet: Low-fat Full Liquids  [email or web address]     Your Stage 3 Diet: Pureed  Foods  [email or web address]     Pureed Recipes  [email or web address]    Your Stage 4 Diet: Soft Foods  [email or web address]    Your Stage 5 Diet: Regular Foods  [email or web address]    Follow up:   Tuesday, November 8th at 2:30 pm    Time spent with patient: 39 minutes.  SHELLY Neal, RD, LD

## 2022-10-12 VITALS — WEIGHT: 214 LBS | BODY MASS INDEX: 37.91 KG/M2

## 2022-10-12 DIAGNOSIS — F41.9 ANXIETY: ICD-10-CM

## 2022-10-12 NOTE — PATIENT INSTRUCTIONS
GOALS:  1. Consider making soups with bone broth to increase protein.      2. Keep up the great work!               - No alcohol              - No smoking              - Seperating fluids from meal time               - Sipping fluids              - Chewing/pace              - No carbonation after surgery     3.. Will need 60 grams of protein/day minimum after surgery    Non-meat Protein Ideas  Quinoa  Eggs  Nuts  Beans  Lentils  Protein pasta   Nutritional yeast  Garden of life raw meal powder  Liquid aminos  Homemade meats - a taco meat could be made with chopped cauliflower/mushroom as an example   Hemp hearts      Vegetarian Meal Blog  [email or web address]     High Fiber Foods:  Bran cereal, 1/3 cup, 8.6 gm fiber   Cooked kidney beans   cup 7.9 gm  Cooked lentils   cup 7.8 gm  Cooked black beans   cup 7.6 gm  Canned chickpeas   cup 5.3 gm  Baked beans   cup 5.2 gm  Pear 1 5.1 gm  Soybeans   cup 5.1 gm  Quinoa   cup 5 gm  Baked sweet potato, with skin 1 medium 4.8 gm  Baked potato, with skin 1 medium 4.4 gm  Cooked frozen green peas   cup 4.4 gm  Bulgur   cup 4.1 gm  Cooked frozen mixed vegetables   cup 4 gm  Raspberries   cup 4 gm  Blackberries   cup 3.8 gm  Almonds 1 oz 3.5 gm  Cooked frozen spinach   cup 3.5 gm  Vegetable or soy yony 1 each 3.4 gm  Apple 1 medium 3.3 gm  Dried dates 5 pieces 3.3 gm    Surgery resources:  Diet Guidelines after Weight Loss Surgery  [email or web address]     Post-op Diet Advancement Schedule:  Clear Liquid Diet (stage 1):  TBD   *No RED, PROTEIN or PULP day before surgery  Low-Fat Full Liquid Diet (stage 2): TBD (2 weeks total between clear/liquid)  Pureed Diet (stage 3): TBD (2 weeks)  Soft Diet (stage 4): TBD (4 weeks)  Regular Diet (stage 5): TBD    Your Stage 1 Diet: Clear Liquids  [email or web address]     Your Stage 2 Diet: Low-fat Full Liquids  [email or web address]     Your Stage 3 Diet: Pureed  Foods  [email or web address]     Pureed Recipes  [email or web address]    Your Stage 4 Diet: Soft Foods  [email or web address]    Your Stage 5 Diet: Regular Foods  [email or web address]    Follow up:   Tuesday, November 8th at 2:30 pm    SHELLY Henning, RD, LD  Clinic #: [phone]

## 2022-10-13 RX ORDER — HYDROXYZINE HYDROCHLORIDE 50 MG/1
TABLET, FILM COATED ORAL
Qty: 70 TABLET | Refills: 1 | OUTPATIENT
Start: 2022-10-13

## 2022-10-19 ENCOUNTER — TELEPHONE (OUTPATIENT)
Dept: INTERNAL MEDICINE | Facility: CLINIC | Age: 52
End: 2022-10-19

## 2022-10-19 DIAGNOSIS — J44.1 COPD EXACERBATION (H): ICD-10-CM

## 2022-10-19 NOTE — TELEPHONE ENCOUNTER
Symptoms similar to COPD flare-up. Chest is getting tighter, nose is running more with yellow green drainage, worsening cough at night and in the morning, fatigue and decreased appetite. No fevers or chills. Patient states that she was not requesting Macrobid (UTI has resolved), she is okay getting the levofloxacin again or if there is another antibiotic recommended for this. However, Z-paks do not work for her.     Brenda Franks RN  [facility]

## 2022-10-19 NOTE — TELEPHONE ENCOUNTER
Patient calls and states that all of her symptoms have returned over the last two days. Patient had EV on. 10/10 with Magaly Purcell.   Patient would prefer to have another round of the MACROBID because the ZPak does not help.  Patient states this time she is sneezing and bringing up green when she coughs and has chest tightness.  Please address and advise patient.

## 2022-10-19 NOTE — TELEPHONE ENCOUNTER
Magaly needs more information regarding which symptoms have come back as she was treated for COPD flare-up on 10/7 with levofloxacin and prednisone, then treated for UTI on 10/10 with Macrobid.    Attempted to call patient's cell phone, call would not go through. Called and left voice message on home phone to call back.      Brenda Franks RN  [facility]

## 2022-10-20 RX ORDER — LEVOFLOXACIN 500 MG/1
500 TABLET, FILM COATED ORAL DAILY
Qty: 7 TABLET | Refills: 0 | Status: SHIPPED | OUTPATIENT
Start: 2022-10-20 | End: 2023-01-27

## 2022-10-20 RX ORDER — PREDNISONE 20 MG/1
40 TABLET ORAL DAILY
Qty: 10 TABLET | Refills: 0 | Status: SHIPPED | OUTPATIENT
Start: 2022-10-20 | End: 2023-01-27

## 2022-10-20 NOTE — TELEPHONE ENCOUNTER
One more course of levaquin and prednisone sent in   If she is not better on this she will need to be seen

## 2022-10-20 NOTE — TELEPHONE ENCOUNTER
Patient informed of recommendations from Magaly and agrees with plan.     Brenda Franks RN  [facility]

## 2022-10-21 ENCOUNTER — NURSE TRIAGE (OUTPATIENT)
Dept: INTERNAL MEDICINE | Facility: CLINIC | Age: 52
End: 2022-10-21

## 2022-10-21 NOTE — TELEPHONE ENCOUNTER
Patient calling   S-(situation): covid positive and breathing difficulty.  Started to have pain in her left back and believes something going on in her left lung.      B-(background): 99.7.  Sputum is white.      A-(assessment): tested positive for Covid.  Vomiting during the night, runny nose, body aches, headache, coughing jags, wheezing. Patient very painful and saying ouch during call a lot.      R-(recommendations): routing to provider for recommendations.  See TE from 10/19.  See Per Magaly, if not better on these medication, should be seen.  Please advise, thanks.  Patient not wanting to go anywhere      Patient stated if the antibiotics don't work the first time, she usually ends up getting pneumonia.  Please advise.     Patient unable to do an UC video visit and her cell is dead and can't access her myc.   Patient not feeling the need to go to the ER and is watching her oxygen levels closely.     Reason for Disposition    MODERATE difficulty breathing (e.g., speaks in phrases, SOB even at rest, pulse 100-120) of new-onset or worse than normal    Additional Information    Negative: SEVERE difficulty breathing (e.g., struggling for each breath, speaks in single words, pulse > 120)    Negative: Breathing stopped and hasn't returned    Negative: Choking on something    Negative: Bluish (or gray) lips or face    Negative: Difficult to awaken or acting confused (e.g., disoriented, slurred speech)    Negative: Passed out (i.e., fainted, collapsed and was not responding)    Negative: Wheezing started suddenly after medicine, an allergic food, or bee sting    Negative: Stridor    Negative: Slow, shallow and weak breathing    Negative: Sounds like a life-threatening emergency to the triager    Negative: Chest pain    Negative: Wheezing (high pitched whistling sound) and previous asthma attacks or use of asthma medicines    Negative: Difficulty breathing and within 14 days of COVID-19 Exposure    Negative: Difficulty  "breathing and only present when coughing    Negative: Difficulty breathing and only from stuffy nose    Negative: Difficulty breathing and only from stuffy nose or runny nose from common cold    Answer Assessment - Initial Assessment Questions  1. RESPIRATORY STATUS: \"Describe your breathing?\" (e.g., wheezing, shortness of breath, unable to speak, severe coughing)       Severe coughing, shortness of breath, wheezing  2. ONSET: \"When did this breathing problem begin?\"       2 days ago.  Stated just finished the medication that Magaly gave her and is not feeling better.    3. PATTERN \"Does the difficult breathing come and go, or has it been constant since it started?\"       Constant.  o2 sats 94 % and pulse 76  4. SEVERITY: \"How bad is your breathing?\" (e.g., mild, moderate, severe)     - MILD: No SOB at rest, mild SOB with walking, speaks normally in sentences, can lie down, no retractions, pulse < 100.     - MODERATE: SOB at rest, SOB with minimal exertion and prefers to sit, cannot lie down flat, speaks in phrases, mild retractions, audible wheezing, pulse 100-120.     - SEVERE: Very SOB at rest, speaks in single words, struggling to breathe, sitting hunched forward, retractions, pulse > 120       moderate  5. RECURRENT SYMPTOM: \"Have you had difficulty breathing before?\" If Yes, ask: \"When was the last time?\" and \"What happened that time?\"       Yes, gets prednisone  6. CARDIAC HISTORY: \"Do you have any history of heart disease?\" (e.g., heart attack, angina, bypass surgery, angioplasty)       no  7. LUNG HISTORY: \"Do you have any history of lung disease?\"  (e.g., pulmonary embolus, asthma, emphysema)      Pneumonia, emphysema  8. CAUSE: \"What do you think is causing the breathing problem?\"       Has covid  9. OTHER SYMPTOMS: \"Do you have any other symptoms? (e.g., dizziness, runny nose, cough, chest pain, fever)      Cough, fever  10. O2 SATURATION MONITOR:  \"Do you use an oxygen saturation monitor (pulse oximeter) " "at home?\" If Yes, \"What is your reading (oxygen level) today?\" \"What is your usual oxygen saturation reading?\" (e.g., 95%)        94%  11. PREGNANCY: \"Is there any chance you are pregnant?\" \"When was your last menstrual period?\"        no  12. TRAVEL: \"Have you traveled out of the country in the last month?\" (e.g., travel history, exposures)        no    Protocols used: BREATHING DIFFICULTY-A-OH      "

## 2022-10-24 ENCOUNTER — TELEPHONE (OUTPATIENT)
Dept: INTERNAL MEDICINE | Facility: CLINIC | Age: 52
End: 2022-10-24

## 2022-10-24 ENCOUNTER — VIRTUAL VISIT (OUTPATIENT)
Dept: ENDOCRINOLOGY | Facility: CLINIC | Age: 52
End: 2022-10-24
Payer: COMMERCIAL

## 2022-10-24 VITALS — WEIGHT: 199 LBS | BODY MASS INDEX: 35.26 KG/M2 | HEIGHT: 63 IN

## 2022-10-24 DIAGNOSIS — E66.813 CLASS 3 SEVERE OBESITY DUE TO EXCESS CALORIES WITH SERIOUS COMORBIDITY AND BODY MASS INDEX (BMI) OF 40.0 TO 44.9 IN ADULT (H): ICD-10-CM

## 2022-10-24 DIAGNOSIS — E66.01 CLASS 3 SEVERE OBESITY DUE TO EXCESS CALORIES WITH SERIOUS COMORBIDITY AND BODY MASS INDEX (BMI) OF 40.0 TO 44.9 IN ADULT (H): ICD-10-CM

## 2022-10-24 PROCEDURE — 99213 OFFICE O/P EST LOW 20 MIN: CPT | Mod: 95 | Performed by: PHYSICIAN ASSISTANT

## 2022-10-24 RX ORDER — NALTREXONE HYDROCHLORIDE 50 MG/1
TABLET, FILM COATED ORAL
Qty: 30 TABLET | Refills: 3 | Status: ON HOLD | OUTPATIENT
Start: 2022-10-24 | End: 2023-06-26

## 2022-10-24 ASSESSMENT — PAIN SCALES - GENERAL: PAINLEVEL: SEVERE PAIN (6)

## 2022-10-24 NOTE — PROGRESS NOTES
Swetha is a 52 year old who is being evaluated via a billable telephone visit.      What phone number would you like to be contacted at? [phone]  How would you like to obtain your AVS? Jacky  Phone call duration: 13 minutes  During this phone visit the patient is located in [place], patient verifies this as the location during the entirety of this visit.        Pre-Bariatric Surgery Note    Roro Chawla    Date: 10/24/2022     RE: Swetha Patterson    MR#: [id number]   : 1970   Date of Visit: Oct 24, 2022    REASON FOR VISIT: Pre-operative evaluation for possible weight loss surgery    Dear Roro Oliveira,    I had the pleasure of seeing your patient, Swetha Patterson, in my preoperative bariatric clinic.    As you know, she is morbidly obese and considering weight loss surgery to treat obesity in association with her medical conditions of obesity.  Her consult weight was 247.  She has lost 48 pounds since her consult weight. She has met her required pre-surgery weight. Please refer to initial consult note from date 22 for patient's weight history and co-morbidities.    Assessment & Plan   Problem List Items Addressed This Visit        Endocrine Diagnoses    Class 3 severe obesity due to excess calories with serious comorbidity and body mass index (BMI) of 40.0 to 44.9 in adult (H)    Relevant Medications    naltrexone (DEPADE/REVIA) 50 MG tablet        52 y.o. female planning on sleeve gastrectomy.   Quit smoking 2022. Planning to get nicotine test 1 mo prior to surgery  See Kasey Whitfield 10/27/22   Call Esther/Jonny after sleep clinic visits in Nov.  Continue naltrexone, refilled today.  Will need to stop 1 week prior to surgery  Pulmonary visit today, needs to reschedule due to day 4 covid.     Reviewed tasklist with patient yes    Most recent weights:  Wt Readings from Last 4 Encounters:   10/24/22 90.3 kg (199 lb)   10/12/22 97.1 kg (214 lb)   22 97.5 kg  "(215 lb)   08/17/22 101.6 kg (224 lb)       ROS    Past Medical History:   Diagnosis Date     Anxiety state, unspecified      Asthma      Chronic pain     back pain from cyst     Contact dermatitis and other eczema, due to unspecified cause      COPD (chronic obstructive pulmonary disease) (H)      Depressive disorder, not elsewhere classified      Diabetes (H)      Emphysema with chronic bronchitis (H)      Esophageal reflux      Family history of ischemic heart disease      Fibromyalgia      Gastro-oesophageal reflux disease      Heart disease     has chest pain sometimes     History of emphysema      Hoarseness      HTN, goal below 140/90      Hyperlipidemia LDL goal <130      Liver disease     \"fatty liver\"     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)      PONV (postoperative nausea and vomiting)      Rectal bleeding        Past Surgical History:   Procedure Laterality Date     ANESTHESIA OUT OF OR MRI N/A 10/7/2021    Procedure: ANESTHESIA OUT OF OR MRI;  Surgeon: GENERIC ANESTHESIA PROVIDER;  Location: [facility] OR     ARTHROSCOPY SHOULDER DECOMPRESSION Left 10/21/2015    Procedure: ARTHROSCOPY SHOULDER DECOMPRESSION;  Surgeon: uJlien Milian MD;  Location: [facility] OR     BIOPSY ARTERY TEMPORAL Left 3/11/2020    Procedure: LEFT TEMPORAL ARTERY BIOPSY;  Surgeon: Ibeth Conner MD;  Location: [facility] OR     BRONCHIAL THERMOPLASTY N/A 11/14/2014    Procedure: BRONCHIAL THERMOPLASTY;  Surgeon: Ward Whitaker MD;  Location: [facility]     BRONCHIAL THERMOPLASTY N/A 12/19/2014    Procedure: BRONCHIAL THERMOPLASTY;  Surgeon: Ward Whitaker MD;  [facility]: [facility]     BRONCHIAL THERMOPLASTY N/A 2/6/2015    Procedure: BRONCHIAL THERMOPLASTY;  Surgeon: Ward Whitaker MD;  [facility]: [facility]     COLONOSCOPY N/A 11/26/2018    Procedure: COLONOSCOPY ([facility]);  Surgeon: Marshall Oakes MD;  Location:  OR     COLONOSCOPY N/A 10/22/2020    Procedure: Colonoscopy;  Surgeon: Marshall Mccormick MD;  Location:  OR "     DISCECTOMY, FUSION CERVICAL ANTERIOR ONE LEVEL, COMBINED N/A 5/1/2018    Procedure: COMBINED DISCECTOMY, FUSION CERVICAL ANTERIOR ONE LEVEL;  1.  C5-C6 anterior cervical diskectomy and fusion.    2.  C5-C6 application of intervertebral biomechanical device for interbody fusion purposes.    3.  C5-C6 anterior instrumentation using the standard Kristin InViZia 24 mm plate with four associated bone screws, 12 mm in length.  Inferior screws are fixed.  Superior screws are va     ENT SURGERY  2015    polyps removed from vocal cords      ESOPHAGOSCOPY, GASTROSCOPY, DUODENOSCOPY (EGD), COMBINED N/A 10/22/2020    Procedure: Esophagoscopy, gastroscopy, duodenoscopy with biopsies;  Surgeon: Marshall Mccormick MD;  Location:  OR     ESOPHAGOSCOPY, GASTROSCOPY, DUODENOSCOPY (EGD), COMBINED N/A 6/17/2021    Procedure: ESOPHAGOGASTRODUODENOSCOPY, WITH BIOPSY;  Surgeon: Darrel Damon MD;  Location:  GI     EXCISE MASS TRUNK Left 11/3/2021    Procedure: EXCISION LEFT SHOULDER LIPOMA;  Surgeon: Ibeth Conner MD;  Location:  OR     EXCISE NODE MEDIASTINAL  4/26/2013    Procedure: EXCISE NODE MEDIASTINAL;;  Surgeon: Av Peña MD;  Location:  OR     LAPAROSCOPIC CHOLECYSTECTOMY N/A 10/19/2017    Procedure: LAPAROSCOPIC CHOLECYSTECTOMY;  LAPAROSCOPIC CHOLECYSTECTOMY;  Surgeon: Ney Jerry MD;  Location:  OR     THORACOSCOPY  4/26/2013    Procedure: THORACOSCOPY;  LEFT VIDEO ASSISTED THORACOSCOPY, RESECTION OF POSTERIOR MEDIASTINAL MASS;  Surgeon: Av Peña MD;  Location:  OR     TONSILLECTOMY  as a kid     TONSILLECTOMY       ZZC APPENDECTOMY  at age 17       Current Outpatient Medications   Medication     albuterol (PROVENTIL) (2.5 MG/3ML) 0.083% neb solution     albuterol (VENTOLIN HFA) 108 (90 Base) MCG/ACT inhaler     amLODIPine (NORVASC) 2.5 MG tablet     atorvastatin (LIPITOR) 80 MG tablet     benzoyl peroxide (ACNE-CLEAR) 10 % external gel     blood glucose (ACCU-CHEK  ALLYSON PLUS) test strip     blood glucose (NO BRAND SPECIFIED) lancets standard     blood glucose (NO BRAND SPECIFIED) test strip     blood glucose monitoring (NO BRAND SPECIFIED) meter device kit     blood glucose monitoring (NO BRAND SPECIFIED) meter device kit     budesonide-formoterol (SYMBICORT) 160-4.5 MCG/ACT Inhaler     cetirizine (ZYRTEC) 10 MG tablet     clindamycin (CLINDAMAX) 1 % external gel     clonazePAM (KLONOPIN) 0.5 MG tablet     clonazePAM (KLONOPIN) 1 MG tablet     Emollient (CERAVE MOISTURIZING) CREA     fluticasone (FLONASE) 50 MCG/ACT nasal spray     furosemide (LASIX) 20 MG tablet     hydrocortisone 2.5 % cream     lamoTRIgine (LAMICTAL) 150 MG tablet     lamoTRIgine (LAMICTAL) 200 MG tablet     levofloxacin (LEVAQUIN) 500 MG tablet     levothyroxine (SYNTHROID/LEVOTHROID) 50 MCG tablet     lisinopril (ZESTRIL) 20 MG tablet     metFORMIN (GLUCOPHAGE) 500 MG tablet     minoxidil (ROGAINE) 5 % external solution     montelukast (SINGULAIR) 10 MG tablet     multivitamin, therapeutic (THERA-VIT) TABS tablet     naltrexone (DEPADE/REVIA) 50 MG tablet     nitroGLYcerin (NITROSTAT) 0.4 MG sublingual tablet     nystatin (MYCOSTATIN) 270389 UNIT/GM external powder     nystatin (MYCOSTATIN) 828921 UNIT/ML suspension     omeprazole (PRILOSEC) 20 MG DR capsule     omeprazole (PRILOSEC) 40 MG DR capsule     ondansetron (ZOFRAN) 8 MG tablet     ondansetron (ZOFRAN-ODT) 4 MG ODT tab     permethrin (LICE TREATMENT CREME RINSE) 1 % external liquid     polyethylene glycol (MIRALAX) 17 GM/Dose powder     predniSONE (DELTASONE) 20 MG tablet     Semaglutide 14 MG TABS     Semaglutide 7 MG TABS     sucralfate (CARAFATE) 1 GM tablet     sulfamethoxazole-trimethoprim (BACTRIM DS) 800-160 MG tablet     terbinafine (LAMISIL) 1 % external cream     triamcinolone (KENALOG) 0.025 % external ointment     Vitamin D3 (CHOLECALCIFEROL) 125 MCG (5000 UT) tablet     buPROPion (WELLBUTRIN XL) 150 MG 24 hr tablet     hydrOXYzine  (ATARAX) 50 MG tablet     No current facility-administered medications for this visit.       Allergies   Allergen Reactions     Codeine Nausea and Vomiting     vomiting     Cyclobenzaprine Nausea     Gabapentin Rash     Hydrocodone Nausea and Vomiting     Sulfa Drugs Nausea and Vomiting     Nausea       Office Visit on 08/17/2022   Component Date Value Ref Range Status     CK 08/17/2022 64  30 - 225 U/L Final     Hemoglobin A1C 08/17/2022 5.5  0.0 - 5.6 % Final    Normal <5.7%   Prediabetes 5.7-6.4%    Diabetes 6.5% or higher     Note: Adopted from ADA consensus guidelines.     WBC Count 08/17/2022 8.2  4.0 - 11.0 10e3/uL Final     RBC Count 08/17/2022 4.90  3.80 - 5.20 10e6/uL Final     Hemoglobin 08/17/2022 13.1  11.7 - 15.7 g/dL Final     Hematocrit 08/17/2022 40.0  35.0 - 47.0 % Final     MCV 08/17/2022 82  78 - 100 fL Final     MCH 08/17/2022 26.7  26.5 - 33.0 pg Final     MCHC 08/17/2022 32.8  31.5 - 36.5 g/dL Final     RDW 08/17/2022 14.9  10.0 - 15.0 % Final     Platelet Count 08/17/2022 339  150 - 450 10e3/uL Final     Sodium 08/17/2022 139  133 - 144 mmol/L Final     Potassium 08/17/2022 4.0  3.4 - 5.3 mmol/L Final     Chloride 08/17/2022 103  94 - 109 mmol/L Final     Carbon Dioxide (CO2) 08/17/2022 26  20 - 32 mmol/L Final     Anion Gap 08/17/2022 10  3 - 14 mmol/L Final     Urea Nitrogen 08/17/2022 15  7 - 30 mg/dL Final     Creatinine 08/17/2022 0.80  0.52 - 1.04 mg/dL Final     Calcium 08/17/2022 9.9  8.5 - 10.1 mg/dL Final     Glucose 08/17/2022 120 (H)  70 - 99 mg/dL Final     Alkaline Phosphatase 08/17/2022 179 (H)  40 - 150 U/L Final     AST 08/17/2022 24  0 - 45 U/L Final     ALT 08/17/2022 47  0 - 50 U/L Final     Protein Total 08/17/2022 7.5  6.8 - 8.8 g/dL Final     Albumin 08/17/2022 3.9  3.4 - 5.0 g/dL Final     Bilirubin Total 08/17/2022 0.2  0.2 - 1.3 mg/dL Final     GFR Estimate 08/17/2022 88  >60 mL/min/1.73m2 Final    Effective December 21, 2021 eGFRcr in adults is calculated using the  "2021 CKD-EPI creatinine equation which includes age and gender (Tanesha francisco al., [place], DOI: 10.[facility]6/IDUPru0806231)     Cholesterol 08/17/2022 161  <200 mg/dL Final     Triglycerides 08/17/2022 423 (H)  <150 mg/dL Final     Direct Measure HDL 08/17/2022 46 (L)  >=50 mg/dL Final     LDL Cholesterol Calculated 08/17/2022    Final    Cannot estimate LDL when triglyceride exceeds 400 mg/dL     Non HDL Cholesterol 08/17/2022 115  <130 mg/dL Final     Patient Fasting > 8hrs? 08/17/2022 No   Final     Creatinine Urine mg/dL 08/17/2022 167  mg/dL Final     Albumin Urine mg/L 08/17/2022 23  mg/L Final     Albumin Urine mg/g Cr 08/17/2022 13.77  0.00 - 25.00 mg/g Cr Final     TSH 08/17/2022 6.29 (H)  0.40 - 4.00 mU/L Final     LDL Cholesterol Direct 08/17/2022 72  <100 mg/dL Final    Age 0-19 years:  Desirable: 0-110 mg/dL   Borderline high: 110-129 mg/dL   High: >= 130 mg/dL    Age 20 years and older:  Desirable: <100mg/dL  Above desirable: 100-129 mg/dL   Borderline high: 130-159 mg/dL   High: 160-189 mg/dL   Very high: >= 190 mg/dL     Free T4 08/17/2022 0.96  0.76 - 1.46 ng/dL Final       PHYSICAL EXAM:  Objective    Ht 1.6 m (5' 3\")   Wt 90.3 kg (199 lb)   BMI 35.25 kg/m             Physical Exam   healthy, alert and no distress  PSYCH: Alert and oriented times 3; coherent speech, normal   rate and volume, able to articulate logical thoughts, able   to abstract reason, no tangential thoughts, no hallucinations   or delusions  Her affect is normal  RESP: No cough, no audible wheezing, able to talk in full sentences  Remainder of exam unable to be completed due to telephone visits    Sleeve Gastrectomy: Risks and Side Effects    The complications or risks of surgery include but are not limited to: death, heart attack, infection in the surgical site (wound infection), abdomen (abscess), bladder (urinary tract infection), lungs (pneumonia), clots in legs (deep vein thrombosis) or lungs (pulmonary emboli),  injury to the bowels " or other organs, bowel obstruction, hernia at the incision and gastrointestional bleeding.    More specific risks related to vertical sleeve gastrectomy were detailed at the bariatric informational seminar and include the following: leak at the vertical sleeve staple line, leak at the anastomoses,  nausea, vomiting, and dehydration for several months,  adhesions causing bowel obstruction, rapid weight loss causing a higher rate of gallstone formation during the first 6 months after surgery, decreased absorption of vitamins and protein because of the reduced stomach size, weight regain if inappropriate food intake occurs, stricture, injury to other organs, hernia,  and ulcers.       Side effects of bariatric surgery include but are not limited to: abdominal pain, cramping, bloating, constipation, nausea, vomiting, diarrhea, difficulty swallowing,  dehydration, hair loss, excess skin, protein, iron and vitamin deficiencies, heartburn, transfer of addictions, increased anxiety and worsening depression.       I emphasized exercise and activity behavior along with appropriate food choice as the main foundation for weight loss with surgery providing surgical reinforcement of the appropriate behavior set.        Review of general surgery weight loss process    1. Complete preoperative requirements, including weight loss.  Final weight check to confirm MANDATORY weight loss requirement must be documented on a clinic scale.    2. Discuss prior authorization with .    3. History and physical evaluation by PCP of PAC clinic within 30 days of surgery date, preoperative class, and weight check (weigh-in visit) to be scheduled by patient.  Pre-anesthesia clinic for risk evaluation to be scheduled by anesthesia clinic.    4. We cannot guarantee that patient will qualify for surgery unless all preoperative requirements are met, prior authorization from primary insurance company is granted, and insurance changes  do not occur.    5. It is possible for patients to regain all weight after weight loss surgery unless they follow guidelines prescribed by our bariatric center.    6. All patients with gastrointestinal complaints after weight loss surgery must have complaints conveyed to the bariatric team for appropriate treatment.    7. Vitamin deficiencies may develop post-bariatric surgery and annual laboratory testing should be performed.    8. Persistent nausea/vomiting after bariatric surgery entails risk of thiamine deficiency and should be treated early.  Vitamin B12 deficiency may develop, especially after gastric bypass surgery and must be recognized.        If you have any questions about our plans please don't hesitate to contact me.    Sincerely,    Rashmi Del Rio PA-C      15 minutes spent on the date of the encounter doing chart review, history and exam, documentation and further activities per the note

## 2022-10-24 NOTE — LETTER
10/24/2022       RE: Swetha Patterson  [id number]   [address]  [facility] [address]     Dear Colleague,    Thank you for referring your patient, Swetha Patterson, to the [facility] [place] at [facility]. Please see a copy of my visit note below.    Swetha is a 52 year old who is being evaluated via a billable telephone visit.      What phone number would you like to be contacted at? [phone]  How would you like to obtain your AVS? MyChart  Phone call duration: 13 minutes  During this phone visit the patient is located in [place], patient verifies this as the location during the entirety of this visit.        Pre-Bariatric Surgery Note    Roro Chawla    Date: 10/24/2022     RE: Swetha Patterson    MR#: [id number]   : 1970   Date of Visit: Oct 24, 2022    REASON FOR VISIT: Pre-operative evaluation for possible weight loss surgery    Dear Roro Oliveira,    I had the pleasure of seeing your patient, Swetha Patterson, in my preoperative bariatric clinic.    As you know, she is morbidly obese and considering weight loss surgery to treat obesity in association with her medical conditions of obesity.  Her consult weight was 247.  She has lost 48 pounds since her consult weight. She has met her required pre-surgery weight. Please refer to initial consult note from date 22 for patient's weight history and co-morbidities.    Assessment & Plan   Problem List Items Addressed This Visit        Endocrine Diagnoses    Class 3 severe obesity due to excess calories with serious comorbidity and body mass index (BMI) of 40.0 to 44.9 in adult (H)    Relevant Medications    naltrexone (DEPADE/REVIA) 50 MG tablet        52 y.o. female planning on sleeve gastrectomy.   Quit smoking 2022. Planning to get nicotine test 1 mo prior to surgery  See Kasey Whitfield 10/27/22   Call Esther/Jonny after sleep clinic visits in  "Nov.  Continue naltrexone, refilled today.  Will need to stop 1 week prior to surgery  Pulmonary visit today, needs to reschedule due to day 4 covid.     Reviewed tasklist with patient yes    Most recent weights:  Wt Readings from Last 4 Encounters:   10/24/22 90.3 kg (199 lb)   10/12/22 97.1 kg (214 lb)   09/08/22 97.5 kg (215 lb)   08/17/22 101.6 kg (224 lb)       ROS    Past Medical History:   Diagnosis Date     Anxiety state, unspecified      Asthma      Chronic pain     back pain from cyst     Contact dermatitis and other eczema, due to unspecified cause      COPD (chronic obstructive pulmonary disease) (H)      Depressive disorder, not elsewhere classified      Diabetes (H)      Emphysema with chronic bronchitis (H)      Esophageal reflux      Family history of ischemic heart disease      Fibromyalgia      Gastro-oesophageal reflux disease      Heart disease     has chest pain sometimes     History of emphysema      Hoarseness      HTN, goal below 140/90      Hyperlipidemia LDL goal <130      Liver disease     \"fatty liver\"     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)      PONV (postoperative nausea and vomiting)      Rectal bleeding        Past Surgical History:   Procedure Laterality Date     ANESTHESIA OUT OF OR MRI N/A 10/7/2021    Procedure: ANESTHESIA OUT OF OR MRI;  Surgeon: GENERIC ANESTHESIA PROVIDER;  Location: [facility] OR     ARTHROSCOPY SHOULDER DECOMPRESSION Left 10/21/2015    Procedure: ARTHROSCOPY SHOULDER DECOMPRESSION;  Surgeon: Julien Milian MD;  Location: [facility] OR     BIOPSY ARTERY TEMPORAL Left 3/11/2020    Procedure: LEFT TEMPORAL ARTERY BIOPSY;  Surgeon: Ibeth Conner MD;  Location: [facility] OR     BRONCHIAL THERMOPLASTY N/A 11/14/2014    Procedure: BRONCHIAL THERMOPLASTY;  Surgeon: Ward Whitaker MD;  [facility]: [facility] GI     BRONCHIAL THERMOPLASTY N/A 12/19/2014    Procedure: BRONCHIAL THERMOPLASTY;  Surgeon: Ward Whitaker MD;  [facility]     BRONCHIAL THERMOPLASTY N/A " 2/6/2015    Procedure: BRONCHIAL THERMOPLASTY;  Surgeon: Ward Whitaker MD;  [facility]: [facility]     COLONOSCOPY N/A 11/26/2018    Procedure: COLONOSCOPY ([facility]);  Surgeon: Marshall Oakes MD;  Location:  OR     COLONOSCOPY N/A 10/22/2020    Procedure: Colonoscopy;  Surgeon: Marshall Mccormick MD;  Location: [facility] OR     DISCECTOMY, FUSION CERVICAL ANTERIOR ONE LEVEL, COMBINED N/A 5/1/2018    Procedure: COMBINED DISCECTOMY, FUSION CERVICAL ANTERIOR ONE LEVEL;  1.  C5-C6 anterior cervical diskectomy and fusion.    2.  C5-C6 application of intervertebral biomechanical device for interbody fusion purposes.    3.  C5-C6 anterior instrumentation using the standard Kristin InViZia 24 mm plate with four associated bone screws, 12 mm in length.  Inferior screws are fixed.  Superior screws are va     ENT SURGERY  2015    polyps removed from vocal cords      ESOPHAGOSCOPY, GASTROSCOPY, DUODENOSCOPY (EGD), COMBINED N/A 10/22/2020    Procedure: Esophagoscopy, gastroscopy, duodenoscopy with biopsies;  Surgeon: Marshall Mccormick MD;  Location:  OR     ESOPHAGOSCOPY, GASTROSCOPY, DUODENOSCOPY (EGD), COMBINED N/A 6/17/2021    Procedure: ESOPHAGOGASTRODUODENOSCOPY, WITH BIOPSY;  Surgeon: Darrel Damon MD;  Location:  GI     EXCISE MASS TRUNK Left 11/3/2021    Procedure: EXCISION LEFT SHOULDER LIPOMA;  Surgeon: Ibeth Conner MD;  Location:  OR     EXCISE NODE MEDIASTINAL  4/26/2013    Procedure: EXCISE NODE MEDIASTINAL;;  Surgeon: Av Peña MD;  Location:  OR     LAPAROSCOPIC CHOLECYSTECTOMY N/A 10/19/2017    Procedure: LAPAROSCOPIC CHOLECYSTECTOMY;  LAPAROSCOPIC CHOLECYSTECTOMY;  Surgeon: Ney Jerry MD;  Location:  OR     THORACOSCOPY  4/26/2013    Procedure: THORACOSCOPY;  LEFT VIDEO ASSISTED THORACOSCOPY, RESECTION OF POSTERIOR MEDIASTINAL MASS;  Surgeon: Av Peña MD;  Location:  OR     TONSILLECTOMY  as a kid     TONSILLECTOMY       ZZC APPENDECTOMY  at age 17        Current Outpatient Medications   Medication     albuterol (PROVENTIL) (2.5 MG/3ML) 0.083% neb solution     albuterol (VENTOLIN HFA) 108 (90 Base) MCG/ACT inhaler     amLODIPine (NORVASC) 2.5 MG tablet     atorvastatin (LIPITOR) 80 MG tablet     benzoyl peroxide (ACNE-CLEAR) 10 % external gel     blood glucose (ACCU-CHEK ALLYSON PLUS) test strip     blood glucose (NO BRAND SPECIFIED) lancets standard     blood glucose (NO BRAND SPECIFIED) test strip     blood glucose monitoring (NO BRAND SPECIFIED) meter device kit     blood glucose monitoring (NO BRAND SPECIFIED) meter device kit     budesonide-formoterol (SYMBICORT) 160-4.5 MCG/ACT Inhaler     cetirizine (ZYRTEC) 10 MG tablet     clindamycin (CLINDAMAX) 1 % external gel     clonazePAM (KLONOPIN) 0.5 MG tablet     clonazePAM (KLONOPIN) 1 MG tablet     Emollient (CERAVE MOISTURIZING) CREA     fluticasone (FLONASE) 50 MCG/ACT nasal spray     furosemide (LASIX) 20 MG tablet     hydrocortisone 2.5 % cream     lamoTRIgine (LAMICTAL) 150 MG tablet     lamoTRIgine (LAMICTAL) 200 MG tablet     levofloxacin (LEVAQUIN) 500 MG tablet     levothyroxine (SYNTHROID/LEVOTHROID) 50 MCG tablet     lisinopril (ZESTRIL) 20 MG tablet     metFORMIN (GLUCOPHAGE) 500 MG tablet     minoxidil (ROGAINE) 5 % external solution     montelukast (SINGULAIR) 10 MG tablet     multivitamin, therapeutic (THERA-VIT) TABS tablet     naltrexone (DEPADE/REVIA) 50 MG tablet     nitroGLYcerin (NITROSTAT) 0.4 MG sublingual tablet     nystatin (MYCOSTATIN) 056783 UNIT/GM external powder     nystatin (MYCOSTATIN) 608259 UNIT/ML suspension     omeprazole (PRILOSEC) 20 MG DR capsule     omeprazole (PRILOSEC) 40 MG DR capsule     ondansetron (ZOFRAN) 8 MG tablet     ondansetron (ZOFRAN-ODT) 4 MG ODT tab     permethrin (LICE TREATMENT CREME RINSE) 1 % external liquid     polyethylene glycol (MIRALAX) 17 GM/Dose powder     predniSONE (DELTASONE) 20 MG tablet     Semaglutide 14 MG TABS     Semaglutide 7 MG TABS      sucralfate (CARAFATE) 1 GM tablet     sulfamethoxazole-trimethoprim (BACTRIM DS) 800-160 MG tablet     terbinafine (LAMISIL) 1 % external cream     triamcinolone (KENALOG) 0.025 % external ointment     Vitamin D3 (CHOLECALCIFEROL) 125 MCG (5000 UT) tablet     buPROPion (WELLBUTRIN XL) 150 MG 24 hr tablet     hydrOXYzine (ATARAX) 50 MG tablet     No current facility-administered medications for this visit.       Allergies   Allergen Reactions     Codeine Nausea and Vomiting     vomiting     Cyclobenzaprine Nausea     Gabapentin Rash     Hydrocodone Nausea and Vomiting     Sulfa Drugs Nausea and Vomiting     Nausea       Office Visit on 08/17/2022   Component Date Value Ref Range Status     CK 08/17/2022 64  30 - 225 U/L Final     Hemoglobin A1C 08/17/2022 5.5  0.0 - 5.6 % Final    Normal <5.7%   Prediabetes 5.7-6.4%    Diabetes 6.5% or higher     Note: Adopted from ADA consensus guidelines.     WBC Count 08/17/2022 8.2  4.0 - 11.0 10e3/uL Final     RBC Count 08/17/2022 4.90  3.80 - 5.20 10e6/uL Final     Hemoglobin 08/17/2022 13.1  11.7 - 15.7 g/dL Final     Hematocrit 08/17/2022 40.0  35.0 - 47.0 % Final     MCV 08/17/2022 82  78 - 100 fL Final     MCH 08/17/2022 26.7  26.5 - 33.0 pg Final     MCHC 08/17/2022 32.8  31.5 - 36.5 g/dL Final     RDW 08/17/2022 14.9  10.0 - 15.0 % Final     Platelet Count 08/17/2022 339  150 - 450 10e3/uL Final     Sodium 08/17/2022 139  133 - 144 mmol/L Final     Potassium 08/17/2022 4.0  3.4 - 5.3 mmol/L Final     Chloride 08/17/2022 103  94 - 109 mmol/L Final     Carbon Dioxide (CO2) 08/17/2022 26  20 - 32 mmol/L Final     Anion Gap 08/17/2022 10  3 - 14 mmol/L Final     Urea Nitrogen 08/17/2022 15  7 - 30 mg/dL Final     Creatinine 08/17/2022 0.80  0.52 - 1.04 mg/dL Final     Calcium 08/17/2022 9.9  8.5 - 10.1 mg/dL Final     Glucose 08/17/2022 120 (H)  70 - 99 mg/dL Final     Alkaline Phosphatase 08/17/2022 179 (H)  40 - 150 U/L Final     AST 08/17/2022 24  0 - 45 U/L Final      "ALT 08/17/2022 47  0 - 50 U/L Final     Protein Total 08/17/2022 7.5  6.8 - 8.8 g/dL Final     Albumin 08/17/2022 3.9  3.4 - 5.0 g/dL Final     Bilirubin Total 08/17/2022 0.2  0.2 - 1.3 mg/dL Final     GFR Estimate 08/17/2022 88  >60 mL/min/1.73m2 Final    Effective December 21, 2021 eGFRcr in adults is calculated using the 2021 CKD-EPI creatinine equation which includes age and gender (Tanesha et al., NEJ, DOI: 10.1056/LAYEih1494358)     Cholesterol 08/17/2022 161  <200 mg/dL Final     Triglycerides 08/17/2022 423 (H)  <150 mg/dL Final     Direct Measure HDL 08/17/2022 46 (L)  >=50 mg/dL Final     LDL Cholesterol Calculated 08/17/2022    Final    Cannot estimate LDL when triglyceride exceeds 400 mg/dL     Non HDL Cholesterol 08/17/2022 115  <130 mg/dL Final     Patient Fasting > 8hrs? 08/17/2022 No   Final     Creatinine Urine mg/dL 08/17/2022 167  mg/dL Final     Albumin Urine mg/L 08/17/2022 23  mg/L Final     Albumin Urine mg/g Cr 08/17/2022 13.77  0.00 - 25.00 mg/g Cr Final     TSH 08/17/2022 6.29 (H)  0.40 - 4.00 mU/L Final     LDL Cholesterol Direct 08/17/2022 72  <100 mg/dL Final    Age 0-19 years:  Desirable: 0-110 mg/dL   Borderline high: 110-129 mg/dL   High: >= 130 mg/dL    Age 20 years and older:  Desirable: <100mg/dL  Above desirable: 100-129 mg/dL   Borderline high: 130-159 mg/dL   High: 160-189 mg/dL   Very high: >= 190 mg/dL     Free T4 08/17/2022 0.96  0.76 - 1.46 ng/dL Final       PHYSICAL EXAM:  Objective     Ht 1.6 m (5' 3\")   Wt 90.3 kg (199 lb)   BMI 35.25 kg/m             Physical Exam   healthy, alert and no distress  PSYCH: Alert and oriented times 3; coherent speech, normal   rate and volume, able to articulate logical thoughts, able   to abstract reason, no tangential thoughts, no hallucinations   or delusions  Her affect is normal  RESP: No cough, no audible wheezing, able to talk in full sentences  Remainder of exam unable to be completed due to telephone visits    Sleeve Gastrectomy: " Risks and Side Effects    The complications or risks of surgery include but are not limited to: death, heart attack, infection in the surgical site (wound infection), abdomen (abscess), bladder (urinary tract infection), lungs (pneumonia), clots in legs (deep vein thrombosis) or lungs (pulmonary emboli),  injury to the bowels or other organs, bowel obstruction, hernia at the incision and gastrointestional bleeding.    More specific risks related to vertical sleeve gastrectomy were detailed at the bariatric informational seminar and include the following: leak at the vertical sleeve staple line, leak at the anastomoses,  nausea, vomiting, and dehydration for several months,  adhesions causing bowel obstruction, rapid weight loss causing a higher rate of gallstone formation during the first 6 months after surgery, decreased absorption of vitamins and protein because of the reduced stomach size, weight regain if inappropriate food intake occurs, stricture, injury to other organs, hernia,  and ulcers.       Side effects of bariatric surgery include but are not limited to: abdominal pain, cramping, bloating, constipation, nausea, vomiting, diarrhea, difficulty swallowing,  dehydration, hair loss, excess skin, protein, iron and vitamin deficiencies, heartburn, transfer of addictions, increased anxiety and worsening depression.       I emphasized exercise and activity behavior along with appropriate food choice as the main foundation for weight loss with surgery providing surgical reinforcement of the appropriate behavior set.        Review of general surgery weight loss process    1. Complete preoperative requirements, including weight loss.  Final weight check to confirm MANDATORY weight loss requirement must be documented on a clinic scale.    2. Discuss prior authorization with .    3. History and physical evaluation by PCP of PAC clinic within 30 days of surgery date, preoperative class, and weight  check (weigh-in visit) to be scheduled by patient.  Pre-anesthesia clinic for risk evaluation to be scheduled by anesthesia clinic.    4. We cannot guarantee that patient will qualify for surgery unless all preoperative requirements are met, prior authorization from primary insurance company is granted, and insurance changes do not occur.    5. It is possible for patients to regain all weight after weight loss surgery unless they follow guidelines prescribed by our bariatric center.    6. All patients with gastrointestinal complaints after weight loss surgery must have complaints conveyed to the bariatric team for appropriate treatment.    7. Vitamin deficiencies may develop post-bariatric surgery and annual laboratory testing should be performed.    8. Persistent nausea/vomiting after bariatric surgery entails risk of thiamine deficiency and should be treated early.  Vitamin B12 deficiency may develop, especially after gastric bypass surgery and must be recognized.        If you have any questions about our plans please don't hesitate to contact me.    Sincerely,    Rashmi Del Rio PA-C      15 minutes spent on the date of the encounter doing chart review, history and exam, documentation and further activities per the note

## 2022-10-24 NOTE — NURSING NOTE
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)     DIAZ (nonalcoholic steatohepatitis)     Status post cervical spinal fusion     Depression     Vocal cord polyp     HTN, goal below 140/90     COPD, moderate (H)     Claudication of both lower extremities (H)     PAD (peripheral artery disease) (H)     Jaw claudication     Class 3 severe obesity due to excess calories with serious comorbidity and body mass index (BMI) of 40.0 to 44.9 in adult (H)     Anorectal disorder     Change in bowel movement     Diaphragmatic hernia     Digestive symptom     Epigastric pain     Nausea     Steatosis of liver     Rectal pain    )  (   Current Outpatient Medications   Medication Sig Dispense Refill     albuterol (PROVENTIL) (2.5 MG/3ML) 0.083% neb solution INHALE ONE VIAL BY NEBULIZER EVERY 6 HOURS AS NEEDED FOR SHORTNESS OF BREATH OR WHEEZING 75 mL 3     albuterol (VENTOLIN HFA) 108 (90 Base) MCG/ACT inhaler Inhale 2 puffs into the lungs every 6 hours 18 g 0     amLODIPine (NORVASC) 2.5 MG tablet Take 2 tablets (5 mg) by mouth daily 180 tablet 2     atorvastatin (LIPITOR) 80 MG tablet TAKE ONE TABLET BY MOUTH EVERY DAY 90 tablet 3     benzoyl peroxide (ACNE-CLEAR) 10 % external gel Apply topically 2 times daily as needed 90 g 1     blood glucose (ACCU-CHEK ALLYSON PLUS) test strip USE TO TEST BLOOD SUGAR ONCE DAILY OR AS DIRECTED 100 strip 3     blood glucose (NO BRAND SPECIFIED) lancets standard Use to test blood sugar 1 times daily or as " directed. 100 each 3     blood glucose (NO BRAND SPECIFIED) test strip Use to test blood sugar 1 times daily or as directed.  Dispense Accu-chek Olinda Plus or per patient insurance 100 strip 3     blood glucose monitoring (NO BRAND SPECIFIED) meter device kit Use to test blood sugar 1 times daily or as directed.  Dispense Accu-chek Olinda Plus or per insurance preference 1 kit 0     blood glucose monitoring (NO BRAND SPECIFIED) meter device kit Use to test blood sugar 1 times daily or as directed. 1 kit 0     budesonide-formoterol (SYMBICORT) 160-4.5 MCG/ACT Inhaler Inhale 2 puffs into the lungs 2 times daily 30.6 g 3     buPROPion (WELLBUTRIN XL) 150 MG 24 hr tablet TAKE ONE TABLET BY MOUTH EVERY MORNING 90 tablet 2     cetirizine (ZYRTEC) 10 MG tablet TAKE ONE TABLET BY MOUTH EVERY DAY AS NEEDED 90 tablet 3     clindamycin (CLINDAMAX) 1 % external gel Apply topically 2 times daily 60 g 3     clonazePAM (KLONOPIN) 0.5 MG tablet Take 0.5 mg by mouth daily as needed        clonazePAM (KLONOPIN) 1 MG tablet Take 1 tablet (1 mg) by mouth 2 times daily as needed for anxiety She needs to see her PCP to get more tablets 5 tablet 0     Emollient (CERAVE MOISTURIZING) CREA Externally apply 1 Application topically 2 times daily 453 g 11     fluticasone (FLONASE) 50 MCG/ACT nasal spray Spray 2 sprays in nostril daily (Patient taking differently: Spray 2 sprays in nostril daily as needed) 16 g 5     furosemide (LASIX) 20 MG tablet TAKE ONE TABLET BY MOUTH TWICE A  tablet 3     hydrocortisone 2.5 % cream APPLY TO AFFECTED AREA(S) TWO TIMES A DAY 28.35 g 1     hydrOXYzine (ATARAX) 50 MG tablet TAKE TWO TABLETS BY MOUTH THREE TIMES A DAY AS NEEDED FOR ANXIETY 70 tablet 1     lamoTRIgine (LAMICTAL) 150 MG tablet Take 150 mg by mouth daily       lamoTRIgine (LAMICTAL) 200 MG tablet Take 200 mg by mouth daily       levofloxacin (LEVAQUIN) 500 MG tablet Take 1 tablet (500 mg) by mouth daily 7 tablet 0     levothyroxine  (SYNTHROID/LEVOTHROID) 50 MCG tablet Take 1 tablet (50 mcg) by mouth daily 90 tablet 1     lisinopril (ZESTRIL) 20 MG tablet TAKE ONE TABLET BY MOUTH EVERY DAY 90 tablet 0     metFORMIN (GLUCOPHAGE) 500 MG tablet TAKE ONE TABLET BY MOUTH EVERY DAY WITH BREAKFAST 90 tablet 1     minoxidil (ROGAINE) 5 % external solution apply per package directions to the scalp once daily 120 mL 3     montelukast (SINGULAIR) 10 MG tablet TAKE ONE TABLET BY MOUTH AT BEDTIME 90 tablet 0     multivitamin, therapeutic (THERA-VIT) TABS tablet Take 1 tablet by mouth daily       naltrexone (DEPADE/REVIA) 50 MG tablet Take 1/2 tablet once daily 1-2 hours prior to worst cravings for 1 week, then increase to 1 tablet daily as directed if tolerating 30 tablet 3     nitroGLYcerin (NITROSTAT) 0.4 MG sublingual tablet PLACE ONE TABLET UNDER THE TONGUE AT THE 1ST SIGN OF ATTACK. IF PAIN IS UNRELIEVED OR WORSENED 5 MINUTES AFTER 1ST DOSE, PROMPT MEDICAL ASSISTANCE IS NEEDED. MAY REPEAT EVERY 5 MINUTES UNTIL PAIN IS RELIEVED. MAX OF THREE DOSES 25 tablet 11     nystatin (MYCOSTATIN) 815970 UNIT/GM external powder APPLY TOPICALLY TWICE A DAY AS NEEDED SKIN RASH 45 g 3     nystatin (MYCOSTATIN) 840905 UNIT/ML suspension Take 5 mLs (500,000 Units) by mouth 4 times daily 60 mL 1     omeprazole (PRILOSEC) 20 MG DR capsule TAKE ONE CAPSULE BY MOUTH TWICE A  capsule 1     omeprazole (PRILOSEC) 40 MG DR capsule Take 40 mg twice a day for a month then take it daily 60 capsule 3     ondansetron (ZOFRAN) 8 MG tablet TAKE ONE-HALF TO ONE TABLET BY MOUTH EVERY 8 HOURS AS NEEDED FOR NAUSEA 45 tablet 3     ondansetron (ZOFRAN-ODT) 4 MG ODT tab Take 1 tablet (4 mg) by mouth every 6 hours as needed for nausea 12 tablet 1     permethrin (LICE TREATMENT CREME RINSE) 1 % external liquid Apply topically from the neck down, leave on overnight (approx. 8 hours) and repeat 1 weeks later. 120 mL 1     polyethylene glycol (MIRALAX) 17 GM/Dose powder Take 17 g (1  capful) by mouth daily 507 g 1     predniSONE (DELTASONE) 20 MG tablet Take 2 tablets (40 mg) by mouth daily 10 tablet 0     Semaglutide 14 MG TABS Take 14 mg by mouth daily 30 tablet 3     Semaglutide 7 MG TABS Take 7 mg by mouth daily 30 tablet 3     sucralfate (CARAFATE) 1 GM tablet Take 1 tablet (1 g) by mouth 4 times daily as needed for nausea 120 tablet 1     sulfamethoxazole-trimethoprim (BACTRIM DS) 800-160 MG tablet Take 1 tablet by mouth 2 times daily Take one tablet twice daily. For additional refills, please schedule a follow-up appointment. 180 tablet 1     terbinafine (LAMISIL) 1 % external cream APPLY TO AFFECTED AREA(S) TWO TIMES A DAY 30 g 2     triamcinolone (KENALOG) 0.025 % external ointment Apply topically 2 times daily 80 g 1     Vitamin D3 (CHOLECALCIFEROL) 125 MCG (5000 UT) tablet Take 1 tablet by mouth three times a week      )  ( Diabetes Eval:    )    ( Pain Eval:  Severe Pain (6) )    ( Wound Eval:       )    (   History   Smoking Status     Former     Packs/day: 0.50     Years: 30.00     Types: Cigarettes     Start date: 1/1/1985   Smokeless Tobacco     Former     Quit date: 1/6/2022    )    ( Signed By:  Marisabel Fox, EMT; October 24, 2022; 8:09 AM )

## 2022-10-24 NOTE — TELEPHONE ENCOUNTER
[facility] and signature.  Put in Dr. Low's in basket.

## 2022-10-26 ENCOUNTER — MEDICAL CORRESPONDENCE (OUTPATIENT)
Dept: HEALTH INFORMATION MANAGEMENT | Facility: CLINIC | Age: 52
End: 2022-10-26

## 2022-10-27 ENCOUNTER — VIRTUAL VISIT (OUTPATIENT)
Dept: PSYCHOLOGY | Facility: CLINIC | Age: 52
End: 2022-10-27
Payer: COMMERCIAL

## 2022-10-27 DIAGNOSIS — Z03.89 NO DIAGNOSIS ON AXIS I: Primary | ICD-10-CM

## 2022-10-27 PROCEDURE — 99207 PR NO CHARGE LOS: CPT | Performed by: STUDENT IN AN ORGANIZED HEALTH CARE EDUCATION/TRAINING PROGRAM

## 2022-10-27 RX ORDER — HYDROXYZINE HYDROCHLORIDE 50 MG/1
TABLET, FILM COATED ORAL
Qty: 70 TABLET | Refills: 1 | Status: SHIPPED | OUTPATIENT
Start: 2022-10-27 | End: 2022-11-11

## 2022-10-27 NOTE — TELEPHONE ENCOUNTER
"Requested Prescriptions   Pending Prescriptions Disp Refills     hydrOXYzine (ATARAX) 50 MG tablet 70 tablet 1     Sig: TAKE TWO TABLETS BY MOUTH THREE TIMES A DAY AS NEEDED FOR ANXIETY Strength: 50 mg       Antihistamines Protocol Passed - 10/27/2022 10:21 AM        Passed - Recent (12 mo) or future (30 days) visit within the authorizing provider's specialty     Patient has had an office visit with the authorizing provider or a provider within the authorizing providers department within the previous 12 mos or has a future within next 30 days. See \"Patient Info\" tab in inbasket, or \"Choose Columns\" in Meds & Orders section of the refill encounter.              Passed - Patient is age 3 or older     Apply age and/or weight-based dosing for peds patients age 3 and older.    Forward request to provider for patients under the age of 3.          Passed - Medication is active on med list         Refused Prescriptions Disp Refills     hydrOXYzine (ATARAX) 50 MG tablet [Pharmacy Med Name: HYDROXYZINE HCL 50MG TABS] 70 tablet 1     Sig: TAKE TWO TABLETS BY MOUTH THREE TIMES A DAY AS NEEDED FOR ANXIETY       Antihistamines Protocol Passed - 10/27/2022 10:21 AM        Passed - Recent (12 mo) or future (30 days) visit within the authorizing provider's specialty     Patient has had an office visit with the authorizing provider or a provider within the authorizing providers department within the previous 12 mos or has a future within next 30 days. See \"Patient Info\" tab in inbasket, or \"Choose Columns\" in Meds & Orders section of the refill encounter.              Passed - Patient is age 3 or older     Apply age and/or weight-based dosing for peds patients age 3 and older.    Forward request to provider for patients under the age of 3.          Passed - Medication is active on med list             "

## 2022-10-27 NOTE — PROGRESS NOTES
Attempted phone visit with Swetha today and tried all numbers listed in her chart and given to me during prior interaction. Left generic voicemail at [phone] with callback information and tried to reach her x2 at that number. I had spoken with Swetha earlier this week to confirm our appointment today and to confirm her preferred phone number ([phone]). I am happy to reschedule with her in the near future.     PLAN: Swetha can reach me at [phone] to reschedule and I will re-contact her next week.    Mercedes Whitfield, PhD,   Clinical Health Psychologist  Phone: [phone]  Pager: [phone]

## 2022-10-27 NOTE — TELEPHONE ENCOUNTER
Pharmacy confirms patient is out of refills. Pharmacy states last  was 9/20/22. 70 tablets is only an 11 day supply for patient.     Prescription approved per [employer] Refill Protocol.

## 2022-10-31 NOTE — TELEPHONE ENCOUNTER
----- Message from [place], [place] Patient advised bobby abrams sent to her pharmacy.

## 2022-11-09 ENCOUNTER — MEDICAL CORRESPONDENCE (OUTPATIENT)
Dept: HEALTH INFORMATION MANAGEMENT | Facility: CLINIC | Age: 52
End: 2022-11-09

## 2022-11-10 DIAGNOSIS — R73.03 PREDIABETES: ICD-10-CM

## 2022-11-10 DIAGNOSIS — R73.9 BLOOD GLUCOSE ELEVATED: ICD-10-CM

## 2022-11-10 DIAGNOSIS — I10 HTN, GOAL BELOW 140/90: ICD-10-CM

## 2022-11-10 DIAGNOSIS — E03.9 HYPOTHYROIDISM, UNSPECIFIED TYPE: ICD-10-CM

## 2022-11-10 DIAGNOSIS — R10.13 EPIGASTRIC PAIN: ICD-10-CM

## 2022-11-10 DIAGNOSIS — F41.9 ANXIETY: ICD-10-CM

## 2022-11-11 ENCOUNTER — TELEPHONE (OUTPATIENT)
Dept: ENDOCRINOLOGY | Facility: CLINIC | Age: 52
End: 2022-11-11

## 2022-11-11 RX ORDER — LISINOPRIL 20 MG/1
TABLET ORAL
Qty: 90 TABLET | Refills: 0 | Status: SHIPPED | OUTPATIENT
Start: 2022-11-11 | End: 2023-02-18

## 2022-11-11 RX ORDER — SUCRALFATE 1 G/1
1 TABLET ORAL 4 TIMES DAILY PRN
Qty: 120 TABLET | Refills: 1 | Status: SHIPPED | OUTPATIENT
Start: 2022-11-11 | End: 2023-06-20

## 2022-11-11 RX ORDER — LEVOTHYROXINE SODIUM 50 UG/1
50 TABLET ORAL DAILY
Qty: 90 TABLET | Refills: 1 | Status: SHIPPED | OUTPATIENT
Start: 2022-11-11 | End: 2023-04-23

## 2022-11-11 RX ORDER — HYDROXYZINE HYDROCHLORIDE 50 MG/1
TABLET, FILM COATED ORAL
Qty: 70 TABLET | Refills: 1 | Status: SHIPPED | OUTPATIENT
Start: 2022-11-11 | End: 2022-12-21

## 2022-11-11 RX ORDER — BLOOD SUGAR DIAGNOSTIC
STRIP MISCELLANEOUS
Qty: 100 STRIP | Refills: 3 | Status: SHIPPED | OUTPATIENT
Start: 2022-11-11 | End: 2024-08-19

## 2022-11-11 NOTE — TELEPHONE ENCOUNTER
Patient no show for today's video visit. Sent text with link to join. Called pt, no answer. Sent TVTY message, no response. Closed video after 15 mins. Provided pt with number to reschedule.     SHELLY Henning, RD, LD  Clinic #: [phone]

## 2022-11-15 ENCOUNTER — TELEPHONE (OUTPATIENT)
Dept: INTERNAL MEDICINE | Facility: CLINIC | Age: 52
End: 2022-11-15

## 2022-11-15 ENCOUNTER — TRANSFERRED RECORDS (OUTPATIENT)
Dept: HEALTH INFORMATION MANAGEMENT | Facility: CLINIC | Age: 52
End: 2022-11-15

## 2022-11-15 NOTE — TELEPHONE ENCOUNTER
HOME HEALTH CERTIFICATION 10/31-12/29; received via fax. Orders/Forms in your mailbox to be signed.

## 2022-11-16 ENCOUNTER — ANCILLARY PROCEDURE (OUTPATIENT)
Dept: GENERAL RADIOLOGY | Facility: CLINIC | Age: 52
End: 2022-11-16
Attending: ORTHOPAEDIC SURGERY
Payer: COMMERCIAL

## 2022-11-16 ENCOUNTER — OFFICE VISIT (OUTPATIENT)
Dept: ORTHOPEDICS | Facility: CLINIC | Age: 52
End: 2022-11-16
Payer: COMMERCIAL

## 2022-11-16 VITALS — SYSTOLIC BLOOD PRESSURE: 128 MMHG | DIASTOLIC BLOOD PRESSURE: 72 MMHG

## 2022-11-16 DIAGNOSIS — S63.641A SPRAIN OF METACARPOPHALANGEAL (MCP) JOINT OF RIGHT THUMB, INITIAL ENCOUNTER: ICD-10-CM

## 2022-11-16 DIAGNOSIS — S69.91XA INJURY OF RIGHT THUMB, INITIAL ENCOUNTER: ICD-10-CM

## 2022-11-16 DIAGNOSIS — S69.91XA INJURY OF RIGHT THUMB, INITIAL ENCOUNTER: Primary | ICD-10-CM

## 2022-11-16 PROCEDURE — 99213 OFFICE O/P EST LOW 20 MIN: CPT | Performed by: ORTHOPAEDIC SURGERY

## 2022-11-16 PROCEDURE — 73130 X-RAY EXAM OF HAND: CPT | Mod: TC | Performed by: RADIOLOGY

## 2022-11-16 NOTE — LETTER
"    11/16/2022         RE: Swetha Patterson  [address] [place] [id number]        Dear Colleague,    Thank you for referring your patient, Swetha Patterson, to the [facility] [place]. Please see a copy of my visit note below.    HISTORY OF PRESENT ILLNESS:    Swetha Patterson is a 52 year old female who is seen  for right thumb injury. She reports injury to the thumb ~2 weeks ago when she fell out of bed. She reports her thumb bent backwards.     Present symptoms: right thumb pain located the IP and MCP joint. She reports spasming/ tremor of the thumb spontaneously.  She also notes swelling of the right thumb. Painful with movement.  Treatments tried to this point: activity avoidance, Ibuprofen 800 mg x 7days.   Orthopedic PMH: Chronic neck pain with a prior cervical fusion    Past Medical History:   Diagnosis Date     Anxiety state, unspecified      Asthma      Chronic pain     back pain from cyst     Contact dermatitis and other eczema, due to unspecified cause      COPD (chronic obstructive pulmonary disease) (H)      Depressive disorder, not elsewhere classified      Diabetes (H)      Emphysema with chronic bronchitis (H)      Esophageal reflux      Family history of ischemic heart disease      Fibromyalgia      Gastro-oesophageal reflux disease      Heart disease     has chest pain sometimes     History of emphysema      Hoarseness      HTN, goal below 140/90      Hyperlipidemia LDL goal <130      Liver disease     \"fatty liver\"     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)      PONV (postoperative nausea and vomiting)      Rectal bleeding        Past Surgical History:   Procedure Laterality Date     ANESTHESIA OUT OF OR MRI N/A 10/7/2021    Procedure: ANESTHESIA OUT OF OR MRI;  Surgeon: GENERIC ANESTHESIA PROVIDER;  Location:  OR     ARTHROSCOPY SHOULDER DECOMPRESSION Left 10/21/2015    Procedure: ARTHROSCOPY SHOULDER DECOMPRESSION;  Surgeon: Julien Milian MD;  " Location: [facility] OR     BIOPSY ARTERY TEMPORAL Left 3/11/2020    Procedure: LEFT TEMPORAL ARTERY BIOPSY;  Surgeon: Ibeth Conner MD;  Location: [facility] OR     BRONCHIAL THERMOPLASTY N/A 11/14/2014    Procedure: BRONCHIAL THERMOPLASTY;  Surgeon: Ward Whitaker MD;  Location: [facility]     BRONCHIAL THERMOPLASTY N/A 12/19/2014    Procedure: BRONCHIAL THERMOPLASTY;  Surgeon: Ward Whitaker MD;  [facility]     BRONCHIAL THERMOPLASTY N/A 2/6/2015    Procedure: BRONCHIAL THERMOPLASTY;  Surgeon: Ward Whitaker MD;  [facility]: [facility]     COLONOSCOPY N/A 11/26/2018    Procedure: COLONOSCOPY ([facility]);  Surgeon: Marshall Oakes MD;  Location: [facility] OR     COLONOSCOPY N/A 10/22/2020    Procedure: Colonoscopy;  Surgeon: Marshall Mccormick MD;  Location: [facility] OR     DISCECTOMY, FUSION CERVICAL ANTERIOR ONE LEVEL, COMBINED N/A 5/1/2018    Procedure: COMBINED DISCECTOMY, FUSION CERVICAL ANTERIOR ONE LEVEL;  1.  C5-C6 anterior cervical diskectomy and fusion.    2.  C5-C6 application of intervertebral biomechanical device for interbody fusion purposes.    3.  C5-C6 anterior instrumentation using the standard Kristin InViZia 24 mm plate with four associated bone screws, 12 mm in length.  Inferior screws are fixed.  Superior screws are va     ENT SURGERY  2015    polyps removed from vocal cords      ESOPHAGOSCOPY, GASTROSCOPY, DUODENOSCOPY (EGD), COMBINED N/A 10/22/2020    Procedure: Esophagoscopy, gastroscopy, duodenoscopy with biopsies;  Surgeon: Marshall Mccormick MD;  Location: [facility] OR     ESOPHAGOSCOPY, GASTROSCOPY, DUODENOSCOPY (EGD), COMBINED N/A 6/17/2021    Procedure: ESOPHAGOGASTRODUODENOSCOPY, WITH BIOPSY;  Surgeon: Darrel Damon MD;  Location: [facility]     EXCISE MASS TRUNK Left 11/3/2021    Procedure: EXCISION LEFT SHOULDER LIPOMA;  Surgeon: Ibeth Conner MD;  Location: [facility] OR     EXCISE NODE MEDIASTINAL  4/26/2013    Procedure: EXCISE NODE MEDIASTINAL;;  Surgeon: Av Peña MD;   Location: [facility]     LAPAROSCOPIC CHOLECYSTECTOMY N/A 10/19/2017    Procedure: LAPAROSCOPIC CHOLECYSTECTOMY;  LAPAROSCOPIC CHOLECYSTECTOMY;  Surgeon: Ney Jerry MD;  Location: [facility]     THORACOSCOPY  4/26/2013    Procedure: THORACOSCOPY;  LEFT VIDEO ASSISTED THORACOSCOPY, RESECTION OF POSTERIOR MEDIASTINAL MASS;  Surgeon: Av Peña MD;  Location: [facility]     TONSILLECTOMY  as a kid     TONSILLECTOMY       ZZC APPENDECTOMY  at age 17       Family History   Problem Relation Age of Onset     Heart Disease Mother         murmur, mi     Hypertension Mother      Cerebrovascular Disease Mother      Depression Mother      Gallbladder Disease Mother      Asthma Mother      Family History Negative Father         does not know  him     Family History Negative Brother      Heart Disease Maternal Grandfather      Asthma Maternal Grandfather      Hypertension Maternal Grandfather      Cerebrovascular Disease Maternal Grandfather      Diabetes Maternal Grandfather      Asthma Sister      Hypertension Sister      Cerebrovascular Disease Sister      Hypertension Maternal Grandmother      Cerebrovascular Disease Maternal Grandmother        Social History     Socioeconomic History     Marital status:      Spouse name: Not on file     Number of children: Not on file     Years of education: Not on file     Highest education level: Not on file   Occupational History     Not on file   Tobacco Use     Smoking status: Former     Packs/day: 0.50     Years: 30.00     Pack years: 15.00     Types: Cigarettes     Start date: 1/1/1985     Smokeless tobacco: Former     Quit date: 1/6/2022     Tobacco comments:     smokes 1 cigarettes a day - in the process of trying to quit   Vaping Use     Vaping Use: Never used   Substance and Sexual Activity     Alcohol use: Never     Drug use: No     Sexual activity: Not Currently     Partners: Male     Birth control/protection: None   Other Topics Concern      Service Not  Asked     Blood Transfusions Not Asked     Caffeine Concern No     Comment: 4-5 cans of pop daily     Occupational Exposure Not Asked     Hobby Hazards Not Asked     Sleep Concern Not Asked     Stress Concern Not Asked     Weight Concern Not Asked     Special Diet No     Back Care Not Asked     Exercise No     Comment: on hold     Bike Helmet Not Asked     Seat Belt Not Asked     Self-Exams Not Asked     Parent/sibling w/ CABG, MI or angioplasty before 65F 55M? Not Asked   Social History Narrative     Not on file     Social Determinants of Health     Financial Resource Strain: Not on file   Food Insecurity: Not on file   Transportation Needs: Not on file   Physical Activity: Not on file   Stress: Not on file   Social Connections: Not on file   Intimate Partner Violence: Not on file   Housing Stability: Not on file       Current Outpatient Medications   Medication Sig Dispense Refill     albuterol (PROVENTIL) (2.5 MG/3ML) 0.083% neb solution INHALE ONE VIAL BY NEBULIZER EVERY 6 HOURS AS NEEDED FOR SHORTNESS OF BREATH OR WHEEZING 75 mL 3     albuterol (VENTOLIN HFA) 108 (90 Base) MCG/ACT inhaler Inhale 2 puffs into the lungs every 6 hours 18 g 0     amLODIPine (NORVASC) 2.5 MG tablet Take 2 tablets (5 mg) by mouth daily 180 tablet 2     atorvastatin (LIPITOR) 80 MG tablet TAKE ONE TABLET BY MOUTH EVERY DAY 90 tablet 3     benzoyl peroxide (ACNE-CLEAR) 10 % external gel Apply topically 2 times daily as needed 90 g 1     blood glucose (ACCU-CHEK ALLYSON PLUS) test strip USE TO TEST BLOOD SUGAR ONCE DAILY OR AS DIRECTED 100 strip 3     blood glucose (NO BRAND SPECIFIED) lancets standard Use to test blood sugar 1 times daily or as directed. 100 each 3     blood glucose (NO BRAND SPECIFIED) test strip Use to test blood sugar 1 times daily or as directed.  Dispense Accu-chek Allyson Plus or per patient insurance 100 strip 3     blood glucose monitoring (NO BRAND SPECIFIED) meter device kit Use to test blood sugar 1 times daily  or as directed.  Dispense Accu-chek Olinda Plus or per insurance preference 1 kit 0     blood glucose monitoring (NO BRAND SPECIFIED) meter device kit Use to test blood sugar 1 times daily or as directed. 1 kit 0     budesonide-formoterol (SYMBICORT) 160-4.5 MCG/ACT Inhaler Inhale 2 puffs into the lungs 2 times daily 30.6 g 3     buPROPion (WELLBUTRIN XL) 150 MG 24 hr tablet TAKE ONE TABLET BY MOUTH EVERY MORNING 90 tablet 1     cetirizine (ZYRTEC) 10 MG tablet TAKE ONE TABLET BY MOUTH EVERY DAY AS NEEDED 90 tablet 3     clindamycin (CLINDAMAX) 1 % external gel Apply topically 2 times daily 60 g 3     clonazePAM (KLONOPIN) 0.5 MG tablet Take 0.5 mg by mouth daily as needed        clonazePAM (KLONOPIN) 1 MG tablet Take 1 tablet (1 mg) by mouth 2 times daily as needed for anxiety She needs to see her PCP to get more tablets 5 tablet 0     Emollient (CERAVE MOISTURIZING) CREA Externally apply 1 Application topically 2 times daily 453 g 11     fluticasone (FLONASE) 50 MCG/ACT nasal spray Spray 2 sprays in nostril daily (Patient taking differently: Spray 2 sprays in nostril daily as needed) 16 g 5     furosemide (LASIX) 20 MG tablet TAKE ONE TABLET BY MOUTH TWICE A  tablet 3     hydrocortisone 2.5 % cream APPLY TO AFFECTED AREA(S) TWO TIMES A DAY 28.35 g 1     hydrOXYzine (ATARAX) 50 MG tablet TAKE TWO TABLETS BY MOUTH THREE TIMES A DAY AS NEEDED FOR ANXIETY 70 tablet 1     lamoTRIgine (LAMICTAL) 150 MG tablet Take 150 mg by mouth daily       lamoTRIgine (LAMICTAL) 200 MG tablet Take 200 mg by mouth daily       levofloxacin (LEVAQUIN) 500 MG tablet Take 1 tablet (500 mg) by mouth daily 7 tablet 0     levothyroxine (SYNTHROID/LEVOTHROID) 50 MCG tablet Take 1 tablet (50 mcg) by mouth daily 90 tablet 1     lisinopril (ZESTRIL) 20 MG tablet TAKE ONE TABLET BY MOUTH EVERY DAY 90 tablet 0     metFORMIN (GLUCOPHAGE) 500 MG tablet TAKE ONE TABLET BY MOUTH EVERY DAY WITH BREAKFAST 90 tablet 1     minoxidil (ROGAINE) 5 %  external solution apply per package directions to the scalp once daily 120 mL 3     montelukast (SINGULAIR) 10 MG tablet TAKE ONE TABLET BY MOUTH AT BEDTIME 90 tablet 0     multivitamin, therapeutic (THERA-VIT) TABS tablet Take 1 tablet by mouth daily       naltrexone (DEPADE/REVIA) 50 MG tablet Take 1/2 tablet once daily 1-2 hours prior to worst cravings for 1 week, then increase to 1 tablet daily as directed if tolerating 30 tablet 3     nitroGLYcerin (NITROSTAT) 0.4 MG sublingual tablet PLACE ONE TABLET UNDER THE TONGUE AT THE 1ST SIGN OF ATTACK. IF PAIN IS UNRELIEVED OR WORSENED 5 MINUTES AFTER 1ST DOSE, PROMPT MEDICAL ASSISTANCE IS NEEDED. MAY REPEAT EVERY 5 MINUTES UNTIL PAIN IS RELIEVED. MAX OF THREE DOSES 25 tablet 11     nystatin (MYCOSTATIN) 168590 UNIT/GM external powder APPLY TOPICALLY TWICE A DAY AS NEEDED SKIN RASH 45 g 3     nystatin (MYCOSTATIN) 734577 UNIT/ML suspension Take 5 mLs (500,000 Units) by mouth 4 times daily 60 mL 1     omeprazole (PRILOSEC) 20 MG DR capsule TAKE ONE CAPSULE BY MOUTH TWICE A  capsule 1     omeprazole (PRILOSEC) 40 MG DR capsule Take 40 mg twice a day for a month then take it daily 60 capsule 3     ondansetron (ZOFRAN) 8 MG tablet TAKE ONE-HALF TO ONE TABLET BY MOUTH EVERY 8 HOURS AS NEEDED FOR NAUSEA 45 tablet 3     ondansetron (ZOFRAN-ODT) 4 MG ODT tab Take 1 tablet (4 mg) by mouth every 6 hours as needed for nausea 12 tablet 1     permethrin (LICE TREATMENT CREME RINSE) 1 % external liquid Apply topically from the neck down, leave on overnight (approx. 8 hours) and repeat 1 weeks later. 120 mL 1     polyethylene glycol (MIRALAX) 17 GM/Dose powder Take 17 g (1 capful) by mouth daily 507 g 1     predniSONE (DELTASONE) 20 MG tablet Take 2 tablets (40 mg) by mouth daily 10 tablet 0     Semaglutide 14 MG TABS Take 14 mg by mouth daily 30 tablet 3     Semaglutide 7 MG TABS Take 7 mg by mouth daily 30 tablet 3     sucralfate (CARAFATE) 1 GM tablet Take 1 tablet (1 g) by  mouth 4 times daily as needed for nausea 120 tablet 1     sulfamethoxazole-trimethoprim (BACTRIM DS) 800-160 MG tablet Take 1 tablet by mouth 2 times daily Take one tablet twice daily. For additional refills, please schedule a follow-up appointment. 180 tablet 1     terbinafine (LAMISIL) 1 % external cream APPLY TO AFFECTED AREA(S) TWO TIMES A DAY 30 g 2     triamcinolone (KENALOG) 0.025 % external ointment Apply topically 2 times daily 80 g 1     Vitamin D3 (CHOLECALCIFEROL) 125 MCG (5000 UT) tablet Take 1 tablet by mouth three times a week         Allergies   Allergen Reactions     Codeine Nausea and Vomiting     vomiting     Cyclobenzaprine Nausea     Gabapentin Rash     Hydrocodone Nausea and Vomiting     Sulfa Drugs Nausea and Vomiting     Nausea       REVIEW OF SYSTEMS:  CONSTITUTIONAL:  NEGATIVE for fever, chills, change in weight  INTEGUMENTARY/SKIN:  NEGATIVE for worrisome rashes, moles or lesions  EYES:  NEGATIVE for vision changes or irritation  ENT/MOUTH:  NEGATIVE for ear, mouth and throat problems  RESP:  NEGATIVE for significant cough or SOB  BREAST:  NEGATIVE for masses, tenderness or discharge  CV:  NEGATIVE for chest pain, palpitations or peripheral edema  GI:  NEGATIVE for nausea, abdominal pain, heartburn, or change in bowel habits  :  Negative   MUSCULOSKELETAL:  See HPI above  NEURO:  NEGATIVE for weakness, dizziness or paresthesias  ENDOCRINE:  NEGATIVE for temperature intolerance, skin/hair changes  HEME/ALLERGY/IMMUNE:  NEGATIVE for bleeding problems  PSYCHIATRIC:  NEGATIVE for changes in mood or affect      PHYSICAL EXAM:  There were no vitals taken for this visit.  There is no height or weight on file to calculate BMI.   GENERAL APPEARANCE: healthy, alert and no distress   HEENT: No apparent thyroid megaly. Clear sclera with normal ocular movement  RESPIRATORY: No labored breathing  SKIN: no suspicious lesions or rashes  NEURO: Normal strength and tone, mentation intact and speech  normal  VASCULAR: Good pulses, and capillary refill   LYMPH: no lymphadenopathy   PSYCH:  mentation appears normal and affect normal/bright    MUSCULOSKELETAL:  Not in acute distress  Normal gait    Mild tremor of the right thumb  No gross deformity of the thumb  Localized tenderness along the radial aspect of the MCP joint  Ulnar side is not tender at the MCP joint of the thumb  Independent MCP and IP joint flexion is intact  Circulation is intact  Gross sensation is intact       ASSESSMENT:  Right thumb sprain injury involving radial collateral ligament      PLAN:  X-ray images of the right thumb from today were visualized.  No particular pathology was noted other than mild MCP joint DJD changes.  The nature of sprain injury was explained.    We recommended icing and topical treatment such as Aspercreme or lidocaine gel available over-the-counter.    In addition we recommended 3 to 4 weeks of consistent immobilization with thumb based custom splint which will be made by the hand therapist.  After that, another couple weeks of usage of the splint as needed.    Follow-up as needed otherwise.      Imaging Interpretation:   No acute fractures    Julien Milian MD  Department of Orthopedic Surgery        Disclaimer: This note consists of symbols derived from keyboarding, dictation and/or voice recognition software. As a result, there may be errors in the script that have gone undetected. Please consider this when interpreting information found in this chart.        Again, thank you for allowing me to participate in the care of your patient.        Sincerely,        Julien Milian MD

## 2022-11-16 NOTE — PROGRESS NOTES
"HISTORY OF PRESENT ILLNESS:    Swetha Patterson is a 52 year old female who is seen  for right thumb injury. She reports injury to the thumb ~2 weeks ago when she fell out of bed. She reports her thumb bent backwards.     Present symptoms: right thumb pain located the IP and MCP joint. She reports spasming/ tremor of the thumb spontaneously.  She also notes swelling of the right thumb. Painful with movement.  Treatments tried to this point: activity avoidance, Ibuprofen 800 mg x 7days.   Orthopedic PMH: Chronic neck pain with a prior cervical fusion    Past Medical History:   Diagnosis Date     Anxiety state, unspecified      Asthma      Chronic pain     back pain from cyst     Contact dermatitis and other eczema, due to unspecified cause      COPD (chronic obstructive pulmonary disease) (H)      Depressive disorder, not elsewhere classified      Diabetes (H)      Emphysema with chronic bronchitis (H)      Esophageal reflux      Family history of ischemic heart disease      Fibromyalgia      Gastro-oesophageal reflux disease      Heart disease     has chest pain sometimes     History of emphysema      Hoarseness      HTN, goal below 140/90      Hyperlipidemia LDL goal <130      Liver disease     \"fatty liver\"     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)      PONV (postoperative nausea and vomiting)      Rectal bleeding        Past Surgical History:   Procedure Laterality Date     ANESTHESIA OUT OF OR MRI N/A 10/7/2021    Procedure: ANESTHESIA OUT OF OR MRI;  Surgeon: GENERIC ANESTHESIA PROVIDER;  Location: [facility] OR     ARTHROSCOPY SHOULDER DECOMPRESSION Left 10/21/2015    Procedure: ARTHROSCOPY SHOULDER DECOMPRESSION;  Surgeon: Julien Milian MD;  Location: [facility] OR     BIOPSY ARTERY TEMPORAL Left 3/11/2020    Procedure: LEFT TEMPORAL ARTERY BIOPSY;  Surgeon: Ibeth Conner MD;  Location: [facility] OR     BRONCHIAL THERMOPLASTY N/A 11/14/2014    Procedure: BRONCHIAL THERMOPLASTY;  Surgeon: Ward Whitaker MD;  " Location: [facility]     BRONCHIAL THERMOPLASTY N/A 12/19/2014    Procedure: BRONCHIAL THERMOPLASTY;  Surgeon: Ward Whitaker MD;  [facility]: [facility]     BRONCHIAL THERMOPLASTY N/A 2/6/2015    Procedure: BRONCHIAL THERMOPLASTY;  Surgeon: Ward Whitaker MD;  Location: [facility]     COLONOSCOPY N/A 11/26/2018    Procedure: COLONOSCOPY ([facility]);  Surgeon: Marshall Oakes MD;  Location:  OR     COLONOSCOPY N/A 10/22/2020    Procedure: Colonoscopy;  Surgeon: Marshall Mccormick MD;  Location:  OR     DISCECTOMY, FUSION CERVICAL ANTERIOR ONE LEVEL, COMBINED N/A 5/1/2018    Procedure: COMBINED DISCECTOMY, FUSION CERVICAL ANTERIOR ONE LEVEL;  1.  C5-C6 anterior cervical diskectomy and fusion.    2.  C5-C6 application of intervertebral biomechanical device for interbody fusion purposes.    3.  C5-C6 anterior instrumentation using the standard Kristin InViZia 24 mm plate with four associated bone screws, 12 mm in length.  Inferior screws are fixed.  Superior screws are va     ENT SURGERY  2015    polyps removed from vocal cords      ESOPHAGOSCOPY, GASTROSCOPY, DUODENOSCOPY (EGD), COMBINED N/A 10/22/2020    Procedure: Esophagoscopy, gastroscopy, duodenoscopy with biopsies;  Surgeon: Marshall Mccormick MD;  Location:  OR     ESOPHAGOSCOPY, GASTROSCOPY, DUODENOSCOPY (EGD), COMBINED N/A 6/17/2021    Procedure: ESOPHAGOGASTRODUODENOSCOPY, WITH BIOPSY;  Surgeon: Darrel Damon MD;  Location:  GI     EXCISE MASS TRUNK Left 11/3/2021    Procedure: EXCISION LEFT SHOULDER LIPOMA;  Surgeon: Ibeth Conner MD;  Location:  OR     EXCISE NODE MEDIASTINAL  4/26/2013    Procedure: EXCISE NODE MEDIASTINAL;;  Surgeon: Av Peña MD;  Location:  OR     LAPAROSCOPIC CHOLECYSTECTOMY N/A 10/19/2017    Procedure: LAPAROSCOPIC CHOLECYSTECTOMY;  LAPAROSCOPIC CHOLECYSTECTOMY;  Surgeon: Ney Jerry MD;  Location:  OR     THORACOSCOPY  4/26/2013    Procedure: THORACOSCOPY;  LEFT VIDEO ASSISTED THORACOSCOPY,  RESECTION OF POSTERIOR MEDIASTINAL MASS;  Surgeon: Av Peña MD;  Location: [facility] OR     TONSILLECTOMY  as a kid     TONSILLECTOMY       ZZC APPENDECTOMY  at age 17       Family History   Problem Relation Age of Onset     Heart Disease Mother         murmur, mi     Hypertension Mother      Cerebrovascular Disease Mother      Depression Mother      Gallbladder Disease Mother      Asthma Mother      Family History Negative Father         does not know  him     Family History Negative Brother      Heart Disease Maternal Grandfather      Asthma Maternal Grandfather      Hypertension Maternal Grandfather      Cerebrovascular Disease Maternal Grandfather      Diabetes Maternal Grandfather      Asthma Sister      Hypertension Sister      Cerebrovascular Disease Sister      Hypertension Maternal Grandmother      Cerebrovascular Disease Maternal Grandmother        Social History     Socioeconomic History     Marital status:      Spouse name: Not on file     Number of children: Not on file     Years of education: Not on file     Highest education level: Not on file   Occupational History     Not on file   Tobacco Use     Smoking status: Former     Packs/day: 0.50     Years: 30.00     Pack years: 15.00     Types: Cigarettes     Start date: 1/1/1985     Smokeless tobacco: Former     Quit date: 1/6/2022     Tobacco comments:     smokes 1 cigarettes a day - in the process of trying to quit   Vaping Use     Vaping Use: Never used   Substance and Sexual Activity     Alcohol use: Never     Drug use: No     Sexual activity: Not Currently     Partners: Male     Birth control/protection: None   Other Topics Concern      Service Not Asked     Blood Transfusions Not Asked     Caffeine Concern No     Comment: 4-5 cans of pop daily     Occupational Exposure Not Asked     Hobby Hazards Not Asked     Sleep Concern Not Asked     Stress Concern Not Asked     Weight Concern Not Asked     Special Diet No     Back Care  Not Asked     Exercise No     Comment: on hold     Bike Helmet Not Asked     Seat Belt Not Asked     Self-Exams Not Asked     Parent/sibling w/ CABG, MI or angioplasty before 65F 55M? Not Asked   Social History Narrative     Not on file     Social Determinants of Health     Financial Resource Strain: Not on file   Food Insecurity: Not on file   Transportation Needs: Not on file   Physical Activity: Not on file   Stress: Not on file   Social Connections: Not on file   Intimate Partner Violence: Not on file   Housing Stability: Not on file       Current Outpatient Medications   Medication Sig Dispense Refill     albuterol (PROVENTIL) (2.5 MG/3ML) 0.083% neb solution INHALE ONE VIAL BY NEBULIZER EVERY 6 HOURS AS NEEDED FOR SHORTNESS OF BREATH OR WHEEZING 75 mL 3     albuterol (VENTOLIN HFA) 108 (90 Base) MCG/ACT inhaler Inhale 2 puffs into the lungs every 6 hours 18 g 0     amLODIPine (NORVASC) 2.5 MG tablet Take 2 tablets (5 mg) by mouth daily 180 tablet 2     atorvastatin (LIPITOR) 80 MG tablet TAKE ONE TABLET BY MOUTH EVERY DAY 90 tablet 3     benzoyl peroxide (ACNE-CLEAR) 10 % external gel Apply topically 2 times daily as needed 90 g 1     blood glucose (ACCU-CHEK ALLYSON PLUS) test strip USE TO TEST BLOOD SUGAR ONCE DAILY OR AS DIRECTED 100 strip 3     blood glucose (NO BRAND SPECIFIED) lancets standard Use to test blood sugar 1 times daily or as directed. 100 each 3     blood glucose (NO BRAND SPECIFIED) test strip Use to test blood sugar 1 times daily or as directed.  Dispense Accu-chek Allyson Plus or per patient insurance 100 strip 3     blood glucose monitoring (NO BRAND SPECIFIED) meter device kit Use to test blood sugar 1 times daily or as directed.  Dispense Accu-chek Allyson Plus or per insurance preference 1 kit 0     blood glucose monitoring (NO BRAND SPECIFIED) meter device kit Use to test blood sugar 1 times daily or as directed. 1 kit 0     budesonide-formoterol (SYMBICORT) 160-4.5 MCG/ACT Inhaler Inhale 2  puffs into the lungs 2 times daily 30.6 g 3     buPROPion (WELLBUTRIN XL) 150 MG 24 hr tablet TAKE ONE TABLET BY MOUTH EVERY MORNING 90 tablet 1     cetirizine (ZYRTEC) 10 MG tablet TAKE ONE TABLET BY MOUTH EVERY DAY AS NEEDED 90 tablet 3     clindamycin (CLINDAMAX) 1 % external gel Apply topically 2 times daily 60 g 3     clonazePAM (KLONOPIN) 0.5 MG tablet Take 0.5 mg by mouth daily as needed        clonazePAM (KLONOPIN) 1 MG tablet Take 1 tablet (1 mg) by mouth 2 times daily as needed for anxiety She needs to see her PCP to get more tablets 5 tablet 0     Emollient (CERAVE MOISTURIZING) CREA Externally apply 1 Application topically 2 times daily 453 g 11     fluticasone (FLONASE) 50 MCG/ACT nasal spray Spray 2 sprays in nostril daily (Patient taking differently: Spray 2 sprays in nostril daily as needed) 16 g 5     furosemide (LASIX) 20 MG tablet TAKE ONE TABLET BY MOUTH TWICE A  tablet 3     hydrocortisone 2.5 % cream APPLY TO AFFECTED AREA(S) TWO TIMES A DAY 28.35 g 1     hydrOXYzine (ATARAX) 50 MG tablet TAKE TWO TABLETS BY MOUTH THREE TIMES A DAY AS NEEDED FOR ANXIETY 70 tablet 1     lamoTRIgine (LAMICTAL) 150 MG tablet Take 150 mg by mouth daily       lamoTRIgine (LAMICTAL) 200 MG tablet Take 200 mg by mouth daily       levofloxacin (LEVAQUIN) 500 MG tablet Take 1 tablet (500 mg) by mouth daily 7 tablet 0     levothyroxine (SYNTHROID/LEVOTHROID) 50 MCG tablet Take 1 tablet (50 mcg) by mouth daily 90 tablet 1     lisinopril (ZESTRIL) 20 MG tablet TAKE ONE TABLET BY MOUTH EVERY DAY 90 tablet 0     metFORMIN (GLUCOPHAGE) 500 MG tablet TAKE ONE TABLET BY MOUTH EVERY DAY WITH BREAKFAST 90 tablet 1     minoxidil (ROGAINE) 5 % external solution apply per package directions to the scalp once daily 120 mL 3     montelukast (SINGULAIR) 10 MG tablet TAKE ONE TABLET BY MOUTH AT BEDTIME 90 tablet 0     multivitamin, therapeutic (THERA-VIT) TABS tablet Take 1 tablet by mouth daily       naltrexone (DEPADE/REVIA) 50  MG tablet Take 1/2 tablet once daily 1-2 hours prior to worst cravings for 1 week, then increase to 1 tablet daily as directed if tolerating 30 tablet 3     nitroGLYcerin (NITROSTAT) 0.4 MG sublingual tablet PLACE ONE TABLET UNDER THE TONGUE AT THE 1ST SIGN OF ATTACK. IF PAIN IS UNRELIEVED OR WORSENED 5 MINUTES AFTER 1ST DOSE, PROMPT MEDICAL ASSISTANCE IS NEEDED. MAY REPEAT EVERY 5 MINUTES UNTIL PAIN IS RELIEVED. MAX OF THREE DOSES 25 tablet 11     nystatin (MYCOSTATIN) 263590 UNIT/GM external powder APPLY TOPICALLY TWICE A DAY AS NEEDED SKIN RASH 45 g 3     nystatin (MYCOSTATIN) 127541 UNIT/ML suspension Take 5 mLs (500,000 Units) by mouth 4 times daily 60 mL 1     omeprazole (PRILOSEC) 20 MG DR capsule TAKE ONE CAPSULE BY MOUTH TWICE A  capsule 1     omeprazole (PRILOSEC) 40 MG DR capsule Take 40 mg twice a day for a month then take it daily 60 capsule 3     ondansetron (ZOFRAN) 8 MG tablet TAKE ONE-HALF TO ONE TABLET BY MOUTH EVERY 8 HOURS AS NEEDED FOR NAUSEA 45 tablet 3     ondansetron (ZOFRAN-ODT) 4 MG ODT tab Take 1 tablet (4 mg) by mouth every 6 hours as needed for nausea 12 tablet 1     permethrin (LICE TREATMENT CREME RINSE) 1 % external liquid Apply topically from the neck down, leave on overnight (approx. 8 hours) and repeat 1 weeks later. 120 mL 1     polyethylene glycol (MIRALAX) 17 GM/Dose powder Take 17 g (1 capful) by mouth daily 507 g 1     predniSONE (DELTASONE) 20 MG tablet Take 2 tablets (40 mg) by mouth daily 10 tablet 0     Semaglutide 14 MG TABS Take 14 mg by mouth daily 30 tablet 3     Semaglutide 7 MG TABS Take 7 mg by mouth daily 30 tablet 3     sucralfate (CARAFATE) 1 GM tablet Take 1 tablet (1 g) by mouth 4 times daily as needed for nausea 120 tablet 1     sulfamethoxazole-trimethoprim (BACTRIM DS) 800-160 MG tablet Take 1 tablet by mouth 2 times daily Take one tablet twice daily. For additional refills, please schedule a follow-up appointment. 180 tablet 1     terbinafine  (LAMISIL) 1 % external cream APPLY TO AFFECTED AREA(S) TWO TIMES A DAY 30 g 2     triamcinolone (KENALOG) 0.025 % external ointment Apply topically 2 times daily 80 g 1     Vitamin D3 (CHOLECALCIFEROL) 125 MCG (5000 UT) tablet Take 1 tablet by mouth three times a week         Allergies   Allergen Reactions     Codeine Nausea and Vomiting     vomiting     Cyclobenzaprine Nausea     Gabapentin Rash     Hydrocodone Nausea and Vomiting     Sulfa Drugs Nausea and Vomiting     Nausea       REVIEW OF SYSTEMS:  CONSTITUTIONAL:  NEGATIVE for fever, chills, change in weight  INTEGUMENTARY/SKIN:  NEGATIVE for worrisome rashes, moles or lesions  EYES:  NEGATIVE for vision changes or irritation  ENT/MOUTH:  NEGATIVE for ear, mouth and throat problems  RESP:  NEGATIVE for significant cough or SOB  BREAST:  NEGATIVE for masses, tenderness or discharge  CV:  NEGATIVE for chest pain, palpitations or peripheral edema  GI:  NEGATIVE for nausea, abdominal pain, heartburn, or change in bowel habits  :  Negative   MUSCULOSKELETAL:  See HPI above  NEURO:  NEGATIVE for weakness, dizziness or paresthesias  ENDOCRINE:  NEGATIVE for temperature intolerance, skin/hair changes  HEME/ALLERGY/IMMUNE:  NEGATIVE for bleeding problems  PSYCHIATRIC:  NEGATIVE for changes in mood or affect      PHYSICAL EXAM:  There were no vitals taken for this visit.  There is no height or weight on file to calculate BMI.   GENERAL APPEARANCE: healthy, alert and no distress   HEENT: No apparent thyroid megaly. Clear sclera with normal ocular movement  RESPIRATORY: No labored breathing  SKIN: no suspicious lesions or rashes  NEURO: Normal strength and tone, mentation intact and speech normal  VASCULAR: Good pulses, and capillary refill   LYMPH: no lymphadenopathy   PSYCH:  mentation appears normal and affect normal/bright    MUSCULOSKELETAL:  Not in acute distress  Normal gait    Mild tremor of the right thumb  No gross deformity of the thumb  Localized tenderness  along the radial aspect of the MCP joint  Ulnar side is not tender at the MCP joint of the thumb  Independent MCP and IP joint flexion is intact  Circulation is intact  Gross sensation is intact       ASSESSMENT:  Right thumb sprain injury involving radial collateral ligament      PLAN:  X-ray images of the right thumb from today were visualized.  No particular pathology was noted other than mild MCP joint DJD changes.  The nature of sprain injury was explained.    We recommended icing and topical treatment such as Aspercreme or lidocaine gel available over-the-counter.    In addition we recommended 3 to 4 weeks of consistent immobilization with thumb based custom splint which will be made by the hand therapist.  After that, another couple weeks of usage of the splint as needed.    Follow-up as needed otherwise.      Imaging Interpretation:   No acute fractures    Julien Milian MD  Department of Orthopedic Surgery        Disclaimer: This note consists of symbols derived from keyboarding, dictation and/or voice recognition software. As a result, there may be errors in the script that have gone undetected. Please consider this when interpreting information found in this chart.

## 2022-11-16 NOTE — PATIENT INSTRUCTIONS
1. Injury of right thumb, initial encounter    Order placed for Hand Therapy- custom thumb splint will be made.   Appointment scheduled for 11/17/22, at 12:30 pm at our clinic.   Wear splint consistently for the next 3-4 weeks.   Taper use of splint thereafter.     Recommend use of OTC Medications:   -Tylenol Extra Strength 1,000 mg, 3 times per day.  Do not exceed 3,000 mg/ 24 hours.   -Advil (Ibuprofen) 800 mg per day.     Follow up with Dr. Milian as needed.    Call my office with any questions or concerns, [phone].

## 2022-11-17 ENCOUNTER — THERAPY VISIT (OUTPATIENT)
Dept: OCCUPATIONAL THERAPY | Facility: CLINIC | Age: 52
End: 2022-11-17
Payer: COMMERCIAL

## 2022-11-17 DIAGNOSIS — S69.91XA INJURY OF RIGHT THUMB, INITIAL ENCOUNTER: ICD-10-CM

## 2022-11-17 DIAGNOSIS — S63.641A SPRAIN OF METACARPOPHALANGEAL (MCP) JOINT OF RIGHT THUMB, INITIAL ENCOUNTER: ICD-10-CM

## 2022-11-17 PROCEDURE — 97760 ORTHOTIC MGMT&TRAING 1ST ENC: CPT | Mod: GO | Performed by: OCCUPATIONAL THERAPIST

## 2022-11-17 PROCEDURE — 97165 OT EVAL LOW COMPLEX 30 MIN: CPT | Mod: GO | Performed by: OCCUPATIONAL THERAPIST

## 2022-11-20 PROBLEM — S69.91XA INJURY OF RIGHT THUMB, INITIAL ENCOUNTER: Status: RESOLVED | Noted: 2022-11-20 | Resolved: 2022-11-20

## 2022-11-20 PROBLEM — S63.641A SPRAIN OF METACARPOPHALANGEAL (MCP) JOINT OF RIGHT THUMB, INITIAL ENCOUNTER: Status: RESOLVED | Noted: 2022-11-20 | Resolved: 2022-11-20

## 2022-11-20 PROBLEM — S63.641A SPRAIN OF METACARPOPHALANGEAL (MCP) JOINT OF RIGHT THUMB, INITIAL ENCOUNTER: Status: ACTIVE | Noted: 2022-11-20

## 2022-11-20 PROBLEM — S69.91XA INJURY OF RIGHT THUMB, INITIAL ENCOUNTER: Status: ACTIVE | Noted: 2022-11-20

## 2022-11-21 DIAGNOSIS — Z53.9 DIAGNOSIS NOT YET DEFINED: Primary | ICD-10-CM

## 2022-11-21 PROCEDURE — G0179 MD RECERTIFICATION HHA PT: HCPCS | Performed by: INTERNAL MEDICINE

## 2022-11-21 NOTE — PROGRESS NOTES
Hand Therapy Initial Evaluation  Current Date:  11/20/2022    Subjective:  Swetha Patterson is a 52 year old right hand dominant female.    Diagnosis: R MCP thumb sprain  DOI:  ~2 weeks ago, MD order date 11/16/22    Patient reports symptoms of pain, stiffness/loss of motion, weakness/loss of strength and edema of the right thumb which occurred due to falling out of bed. Since onset symptoms are unchanged. Special tests:  x-ray: negative.  Previous treatment: ibuprofen, rest. General health as reported by patient is fair.  Pertinent medical history includes: Asthma, Depression, Diabetes, Fibromyalgia, High Blood Pressure, History of Fractures, Mental Illness, Numbness/Tingling, Overweight, Rheumatoid Arthritis, Sleep Disorder/Apnea, Thyroid Problems, Cold/Hot Extremity, Pain at Night/Rest, Severe Dizziness, Significant Weakness, Unexplained Weight Loss.  Medical allergies: none.  Surgical history: cancer, orthopedic.  Medication history: Anti-depressants, Anti-inflammatory, High Blood Pressure, Sleep, Steroids, Thyroid.    Occupational Profile Information:  Current occupation is [employer] Diss[employer]  Prior functional level:  no limitations  Barriers include:none  Mobility: No difficulty  Transportation: drives    Upper Extremity Functional Index Score:  SCORE:   Column Totals: /80: 35   (A lower score indicates greater disability.)    Objective:  Pain Level (Scale 0-10):   11/17/2022   At Rest 8/10   With Use 8/10     Pain Description:  Date 11/20/2022   Location R thumb   Pain Quality Aching, Dull and Sharp   Frequency constant     Pain is worst  daytime or nighttime   Exacerbated by  movement/use   Relieved by rest   Progression unchanged     Edema:  Moderate per visual observation    Sensation: WNL per pt report    ROM:  NT  Strength:  contraindicated  Assessment:  Patient presents with symptoms consistent with diagnosis of right thumb sprain, with conservative intervention.     Patient's limitations or Problem List  includes:  Pain, Decreased ROM/motion, Increased edema, Weakness, Decreased , Decreased pinch, Decreased coordination, Decreased dexterity and Tightness in musculature of the right thumb which interferes with the patient's ability to perform Self Care Tasks (dressing, eating, bathing, hygiene/toileting), Sleep Patterns, Recreational Activities, Household Chores and Driving  as compared to previous level of function.    Rehab Potential:  Excellent - Return to full activity, no limitations    Patient will benefit from skilled Occupational Therapy to increase ROM, flexibility,  strength, forearm strength, coordination and dexterity and decrease pain and edema to return to previous activity level and resume normal daily tasks and to reach their rehab potential.    Barriers to Learning:  No barrier    Communication Issues:  Patient appears to be able to clearly communicate and understand verbal and written communication and follow directions correctly.    Assessment of Occupational Performance:  5 or more Performance Deficits  Identified Performance Deficits: bathing/showering, toileting, dressing, feeding, hygiene and grooming, driving and community mobility, health management and maintenance, home establishment and management, meal preparation and cleanup, shopping, sleep and leisure activities      Clinical Decision Making (Complexity): Low complexity  Treatment Explanation:  The following has been discussed with the patient:  RX ordered/plan of care  Anticipated outcomes  Possible risks and side effects    P: Frequency:  1x visit, once daily.  Pt to return for splint adjustments, otherwise cont HEP independently. D/C [facility]    Treatment Plan:    Treatment Plan:  {Orthotic Fabrication:     Static and Hand based     Home Program:    Orthotic Fabrication:   Static and Hand based thumb spica    Discharge Plan:  Achieve all LTG.  Independent in home treatment program.  Reach maximal therapeutic benefit.

## 2022-11-21 NOTE — PROGRESS NOTES
[facility]    OUTPATIENT Occupational Therapy ORTHOPEDIC EVALUATION  PLAN OF TREATMENT FOR OUTPATIENT REHABILITATION  (COMPLETE FOR INITIAL CLAIMS ONLY)  Patient's Last Name, First Name, M.I.  YOB: 1970  Swetha Patterson    Provider s Name:  [facility]   Medical Record No.  [id number]   Start of Care Date:  11/17/22   Onset Date:   11/16/22 (MD order date, pt reports ~2 weeks ago)   Treatment Diagnosis:  R thumb sprain Medical Diagnosis:     Injury of right thumb, initial encounter  Sprain of metacarpophalangeal (MCP) joint of right thumb, initial encounter       Goals:     11/17/22 0500   Goal #1   Goal #1 self care   Previous Performance Level Independent   Current Functional Task Don/Doff   Current Performance Level unable   STG Target Performance Orthosis   STG Target Perform Level no difficulty   Due Date 11/17/22   Date Goal Met 11/17/22   LTG Target Task/Performance Independent management of symptoms   Due Date 11/17/22   Date Goal Met 11/17/22       Therapy Frequency:  1x visit  Predicted Duration of Therapy Intervention:  1x visit    Daija Lott, OT                 I CERTIFY THE NEED FOR THESE SERVICES FURNISHED UNDER        THIS PLAN OF TREATMENT AND WHILE UNDER MY CARE     (Physician attestation of this document indicates review and certification of the therapy plan).                     Certification Date From:  11/17/22   Certification Date To:  11/17/22    Referring Provider:  Julien Milian    Initial Assessment        See Epic Evaluation SOC Date: 11/17/22

## 2022-11-22 ASSESSMENT — ANXIETY QUESTIONNAIRES
6. BECOMING EASILY ANNOYED OR IRRITABLE: NEARLY EVERY DAY
GAD7 TOTAL SCORE: 16
8. IF YOU CHECKED OFF ANY PROBLEMS, HOW DIFFICULT HAVE THESE MADE IT FOR YOU TO DO YOUR WORK, TAKE CARE OF THINGS AT HOME, OR GET ALONG WITH OTHER PEOPLE?: SOMEWHAT DIFFICULT
IF YOU CHECKED OFF ANY PROBLEMS ON THIS QUESTIONNAIRE, HOW DIFFICULT HAVE THESE PROBLEMS MADE IT FOR YOU TO DO YOUR WORK, TAKE CARE OF THINGS AT HOME, OR GET ALONG WITH OTHER PEOPLE: SOMEWHAT DIFFICULT
1. FEELING NERVOUS, ANXIOUS, OR ON EDGE: NEARLY EVERY DAY
4. TROUBLE RELAXING: NEARLY EVERY DAY
7. FEELING AFRAID AS IF SOMETHING AWFUL MIGHT HAPPEN: MORE THAN HALF THE DAYS
GAD7 TOTAL SCORE: 16
5. BEING SO RESTLESS THAT IT IS HARD TO SIT STILL: MORE THAN HALF THE DAYS
7. FEELING AFRAID AS IF SOMETHING AWFUL MIGHT HAPPEN: MORE THAN HALF THE DAYS
GAD7 TOTAL SCORE: 16
2. NOT BEING ABLE TO STOP OR CONTROL WORRYING: SEVERAL DAYS
3. WORRYING TOO MUCH ABOUT DIFFERENT THINGS: MORE THAN HALF THE DAYS

## 2022-11-22 ASSESSMENT — PATIENT HEALTH QUESTIONNAIRE - PHQ9
10. IF YOU CHECKED OFF ANY PROBLEMS, HOW DIFFICULT HAVE THESE PROBLEMS MADE IT FOR YOU TO DO YOUR WORK, TAKE CARE OF THINGS AT HOME, OR GET ALONG WITH OTHER PEOPLE: SOMEWHAT DIFFICULT
SUM OF ALL RESPONSES TO PHQ QUESTIONS 1-9: 12
SUM OF ALL RESPONSES TO PHQ QUESTIONS 1-9: 12

## 2022-11-23 ENCOUNTER — VIRTUAL VISIT (OUTPATIENT)
Dept: PSYCHOLOGY | Facility: CLINIC | Age: 52
End: 2022-11-23
Payer: COMMERCIAL

## 2022-11-23 DIAGNOSIS — F31.62 BIPOLAR DISORDER, CURRENT EPISODE MIXED, MODERATE (H): Primary | ICD-10-CM

## 2022-11-23 DIAGNOSIS — J30.89 DUST ALLERGY: ICD-10-CM

## 2022-11-23 DIAGNOSIS — J44.9 COPD, MODERATE (H): ICD-10-CM

## 2022-11-23 PROCEDURE — 90834 PSYTX W PT 45 MINUTES: CPT | Mod: 95 | Performed by: SOCIAL WORKER

## 2022-11-23 ASSESSMENT — COLUMBIA-SUICIDE SEVERITY RATING SCALE - C-SSRS
2. HAVE YOU ACTUALLY HAD ANY THOUGHTS OF KILLING YOURSELF?: NO
1. HAVE YOU WISHED YOU WERE DEAD OR WISHED YOU COULD GO TO SLEEP AND NOT WAKE UP?: NO

## 2022-11-23 NOTE — ED NOTES
Patient was seen by Dr. Benson for 2-3 weeks of neck and left shoulder pain. This pain got worse on Friday and started shooting into the middle of her back. She was diagnosed with cervical radiculopathy and is supposed to have an MRI done soon. Patient is in extreme pain today, diaphoretic from pain, and can't move her neck or left shoulder because of pain and muscle spasms. ABCDs intact. Alert and oriented x 4.   [General Appearance - Well Developed] : well developed [Normal Appearance] : normal appearance [Well Groomed] : well groomed [General Appearance - Well Nourished] : well nourished [No Deformities] : no deformities [General Appearance - In No Acute Distress] : no acute distress [Normal Conjunctiva] : the conjunctiva exhibited no abnormalities [Normal Jugular Venous A Waves Present] : normal jugular venous A waves present [Normal Jugular Venous V Waves Present] : normal jugular venous V waves present [Respiration, Rhythm And Depth] : normal respiratory rhythm and effort [Exaggerated Use Of Accessory Muscles For Inspiration] : no accessory muscle use [Auscultation Breath Sounds / Voice Sounds] : lungs were clear to auscultation bilaterally [Rhythm Regular] : regular [Normal S1] : normal S1 [Normal S2] : normal S2 [I] : a grade 1 [No Pitting Edema] : no pitting edema present [Bowel Sounds] : normal bowel sounds [Abdomen Soft] : soft [Abdomen Tenderness] : non-tender [Abnormal Walk] : normal gait [Gait - Sufficient For Exercise Testing] : the gait was sufficient for exercise testing [Nail Clubbing] : no clubbing of the fingernails [Cyanosis, Localized] : no localized cyanosis [Petechial Hemorrhages (___cm)] : no petechial hemorrhages [Skin Color & Pigmentation] : normal skin color and pigmentation [] : no rash [No Skin Ulcers] : no skin ulcer [Oriented To Time, Place, And Person] : oriented to person, place, and time [Affect] : the affect was normal [Mood] : the mood was normal [No Anxiety] : not feeling anxious [FreeTextEntry1] : He was wearing a face mask during the examination. [Bradycardia] : bradycardic [S3] : no S3 [Right Carotid Bruit] : no bruit heard over the right carotid [Left Carotid Bruit] : no bruit heard over the left carotid [Bruit] : no bruit heard

## 2022-11-23 NOTE — PROGRESS NOTES
"      [facility]   Mental Health & Addiction Services     Progress Note - Initial Visit    Patient  Name:  Swetah Patterson Date: 2022              Service Type: Individual     Visit Start Time: 2pm Visit End Time: 2:45pm    Visit #: 1    Attendees: Client attended alone    Service Modality:  Phone Visit:      Provider verified identity through the following two step process.  Patient provided:  Patient     The patient has been notified of the following:      \"We have found that certain health care needs can be provided without the need for a face to face visit.  This service lets us provide the care you need with a phone conversation.       I will have full access to your [facility] medical record during this entire phone call.   I will be taking notes for your medical record.      Since this is like an office visit, we will bill your insurance company for this service.       There are potential benefits and risks of telephone visits (e.g. limits to patient confidentiality) that differ from in-person visits.?Confidentiality still applies for telephone services, and nobody will record the visit.  It is important to be in a quiet, private space that is free of distractions (including cell phone or other devices) during the visit.??      If during the course of the call I believe a telephone visit is not appropriate, you will not be charged for this service\"     Consent has been obtained for this service by care team member: Yes        DATA:   Interactive Complexity: No   Crisis: No     Presenting Concerns/  Current Stressors:   Preparing to have Bariatric procedure       ASSESSMENT:  Mental Status Assessment:  Appearance:   Appropriate   Eye Contact:   Good   Psychomotor Behavior: Agitated   Attitude:   Cooperative   Orientation:   Person Place Time Situation  Speech   Rate / Production: Hyperverbal    Volume:  Normal   Mood:    Anxious  Fearful Agitated  Affect:    Worrisome "   Thought Content:  Paranoia   Thought Form:  Blocking   Insight:    Good       Safety Issues and Plan for Safety and Risk Management:     [place] Suicide Severity Rating Scale (Lifetime/Recent)  [place] Suicide Severity Rating (Lifetime/Recent) 12/26/2018 1/2/2019 11/23/2022   Q1 Wished to be Dead (Past Month) no no -   Q2 Suicidal Thoughts (Past Month) no no -   Q6 Suicide Behavior (Lifetime) - no -   1. Wish to be Dead (Lifetime) - - 0   2. Non-Specific Active Suicidal Thoughts (Lifetime) - - 0   Has subject engaged in non-suicidal self-injurious behavior? (Lifetime) - - 1     Patient denies current fears or concerns for personal safety.  Patient denies current or recent suicidal ideation or behaviors.  Patient denies current or recent homicidal ideation or behaviors.  Patient denies current or recent self injurious behavior or ideation.  Patient denies other safety concerns.  Recommended that patient call 911 or go to the local ED should there be a change in any of these risk factors.  Patient reports there are no firearms in the house.     Diagnostic Criteria:  Unspecified Anxiety Disorder   - Excessive anxiety and worry about a number of events or activities (such as work or school performance).    - The person finds it difficult to control the worry.   - Restlessness or feeling keyed up or on edge.    - Being easily fatigued.    - Difficulty concentrating or mind going blank.    - Irritability.   Bipolar I Manic Episode   - more talkative than usual or pressure to keep talking    - flight of ideas or subjectivie experience that thoughts are racing   - distractibility  C. The mood disturbance is sufficiently severe to cause marked impairment in social or occupational functioning or to necessitate hospitalization to prevent harm to self or others, or there are severe psychotic features  E. The episode is sufficiently severe enough to cause marked impairment in social or occupational functioning or to  necessitate hospitalization to prevent harm to self or others, or there are psychotic features      DSM5 Diagnoses: (Sustained by DSM5 Criteria Listed Above)  Diagnoses: 296.40 Bipolar I Disorder Most Recent Episode Hypomanic, Unspecified  293.84 (F06.4) Anxiety Disorder Due to a variety of medical conditions (Medical Condition)  Psychosocial & Contextual Factors: Disabled and Lives with her Father  WHODAS 2.0 (12 item):   WHODAS 2.0 Total Score 10/28/2016 11/22/2022   Total Score 22 27   Total Score MyChart - 27     Intervention:   Assessment  Collateral Reports Completed:  Routed note to PCP      PLAN: (Homework, other):  1. Provider will continue Diagnostic Assessment.  Patient was given the following to do until next session:  [facility]    2. Provider recommended the following referrals: None at this time.      3.  Suicide Risk and Safety Concerns were assessed for Swetha Patterson.    Patient meets the following risk assessment and triage: Patient has no change in safety concerns. Committed to safety and agreed to follow previously developed safety plan.      Rogers Eller, [facility]  November 23, 2022        Answers for HPI/ROS submitted by the patient on 11/22/2022  If you checked off any problems, how difficult have these problems made it for you to do your work, take care of things at home, or get along with other people?: Somewhat difficult  PHQ9 TOTAL SCORE: 12  GIDEON 7 TOTAL SCORE: 16

## 2022-11-25 RX ORDER — MONTELUKAST SODIUM 10 MG/1
TABLET ORAL
Qty: 90 TABLET | Refills: 1 | Status: SHIPPED | OUTPATIENT
Start: 2022-11-25 | End: 2023-04-23

## 2022-11-29 ENCOUNTER — VIRTUAL VISIT (OUTPATIENT)
Dept: ENDOCRINOLOGY | Facility: CLINIC | Age: 52
End: 2022-11-29
Payer: COMMERCIAL

## 2022-11-29 VITALS — BODY MASS INDEX: 35.61 KG/M2 | WEIGHT: 201 LBS

## 2022-11-29 DIAGNOSIS — E66.812 OBESITY, CLASS II, BMI 35-39.9: ICD-10-CM

## 2022-11-29 DIAGNOSIS — Z71.3 NUTRITIONAL COUNSELING: Primary | ICD-10-CM

## 2022-11-29 PROCEDURE — 97803 MED NUTRITION INDIV SUBSEQ: CPT | Mod: 93 | Performed by: DIETITIAN, REGISTERED

## 2022-11-29 NOTE — PATIENT INSTRUCTIONS
GOALS:   Keep up the great work!               - No alcohol              - No smoking              - Seperating fluids from meal time               - Sipping fluids              - Chewing/pace              - No carbonation after surgery      Will need 60 grams of protein/day minimum after surgery    Protein Shakes Examples:  Premier Protein   Slim Fast Advanced Nutrition   Muscle Milk, lactose-free  Fairlife Core Power   Ensure Max Protein  Boost Max Protein  Orgain  Aldi s Elevation Protein Shake     Protein Powders:   Integrated Supplements, no artificial sugars   Genepro, unflavored protein powder   BiPro  Vital HP Collagen Peptides      Clear Liquid options:  Unflavored protein powders   Bone Broth   Protein Jello  Premier Protein clear (shake)  Hwsolne2V (beverage)   Gatorade/Powerade Zero with Protein     Non-meat Protein Ideas  Quinoa  Eggs  Nuts  Beans  Lentils  Protein pasta   Nutritional yeast  Garden of life raw meal powder  Liquid aminos  Homemade meats - a taco meat could be made with chopped cauliflower/mushroom as an example   Hem hearts      Vegetarian Meal Blog  [email or web address]     High Fiber Foods:  Bran cereal, 1/3 cup, 8.6 gm fiber   Cooked kidney beans   cup 7.9 gm  Cooked lentils   cup 7.8 gm  Cooked black beans   cup 7.6 gm  Canned chickpeas   cup 5.3 gm  Baked beans   cup 5.2 gm  Pear 1 5.1 gm  Soybeans   cup 5.1 gm  Quinoa   cup 5 gm  Baked sweet potato, with skin 1 medium 4.8 gm  Baked potato, with skin 1 medium 4.4 gm  Cooked frozen green peas   cup 4.4 gm  Bulgur   cup 4.1 gm  Cooked frozen mixed vegetables   cup 4 gm  Raspberries   cup 4 gm  Blackberries   cup 3.8 gm  Almonds 1 oz 3.5 gm  Cooked frozen spinach   cup 3.5 gm  Vegetable or soy yony 1 each 3.4 gm  Apple 1 medium 3.3 gm  Dried dates 5 pieces 3.3 gm    Surgery resources:  Diet Guidelines after Weight Loss Surgery  [email or web address]     Your Stage 1 Diet: Clear  Liquids  [email or web address]     Your Stage 2 Diet: Low-fat Full Liquids  [email or web address]     Your Stage 3 Diet: Pureed Foods  [email or web address]     Pureed Recipes  [email or web address]    Your Stage 4 Diet: Soft Foods  [email or web address]    Your Stage 5 Diet: Regular Foods  [email or web address]    Follow up: Tuesday, January 10th at 11:00 am    SHELLY Henning, RD, LD  Clinic #: [phone]

## 2022-11-29 NOTE — PROGRESS NOTES
"Swetha Patterson is a 52 year old female who is being evaluated via a billable telephone visit.     The patient has been notified of following:     \"This telephone visit will be conducted via a call between you and your physician/provider. We have found that certain health care needs can be provided without the need for a physical exam.  This service lets us provide the care you need with a short phone conversation.  If a prescription is necessary we can send it directly to your pharmacy.  If lab work is needed we can place an order for that and you can then stop by our lab to have the test done at a later time.    Telephone visits are billed at different rates depending on your insurance coverage. During this emergency period, for some insurers they may be billed the same as an in-person visit.  Please reach out to your insurance provider with any questions.    If during the course of the call the physician/provider feels a telephone visit is not appropriate, you will not be charged for this service.\"    Patient has given verbal consent for Telephone visit?  Yes    Phone call duration: 44 minutes    During this virtual visit the patient is located in [place], patient verifies this as the location during the entirety of this visit.       Bariatric Nutrition Consultation Note    Reason For Visit: Nutrition Assessment    Swetha Patterson is a 52 year old presenting today for return bariatric nutrition consult.   Pt is interested in laparoscopic sleeve gastrectomy.    Pt has met the minimum required RD appointments but does need formal post op education once she has a surgery date.     Pt referred by NOE Blake on January 20, 2022.    Coordination note:  Needs letter of support from therapist and psychiatrist  - Started seeing Mercedes Whitfield 9/29. Most recently working with Rogers Eller   Needs clearance from sleep medicine - In progress, had to reschedule sleep study due to being sick. Now seeing 1/25/23  Needs PCP " "letter of support - Done per pt.   Surgeon meet and greet with Dr. Leach - Done 6/9/22   No nicotine for 3 months prior to surgery - Done     Needs Psych eval  - On file 7/6/22 from Dr. Hartman  Sobriety from alcohol for 1 year - May 2022 - Met  Needs baseline labs - Done 1/24/22  10 lb weight loss from initial - Met    Patient with Co-morbidities of obesity including:  Type II DM no but does have pre-diabetes, last A1C 5.8 on 1/24/22  Renal Failure no  Sleep apnea yes  Hypertension yes  Dyslipidemia yes  Joint pain no  Back pain no  GERD no     PMH: fatty liver (nonalcoholic per pt), fibromyalgia, cholecystomy, asthma    Sober since May 2021  Hx of nicotine abuse    Support System Reviewed With Patient 1/19/2022   Who do you have in your support network that can be available to help you for the first 2 weeks after surgery? I have my sister Rukhsana a very close friend of keri Mckinley and I have my dad and I also have psychiatrist my sister Rukhsana Haynesgurwinder Kearns my dad and my psychiatrist again Rukhsana Milian   Who can you count on for support throughout your weight loss surgery journey? Rukhsana Kearns my dad my psychiatrist       ANTHROPOMETRICS:  Consult weight 1/20/22: 247 lbs with BMI 43.75    Estimated body mass index is 35.25 kg/m  as calculated from the following:    Height as of 10/24/22: 1.6 m (5' 3\").    Weight as of 10/24/22: 90.3 kg (199 lb).     Current weight: ~200-203 lbs, pt report    Required weight loss goal pre-op: 10 lbs from initial consult weight (goal weight 237 lbs or less before surgery)       1/19/2022   I have tried the following methods to lose weight Watching portions or calories, Exercise, Physician directed program       Weight Loss Questions Reviewed With Patient 1/19/2022   How long have you been overweight? From Middle age and beyond     MEDICATIONS FOR WEIGHT LOSS:  Semaglutide  Naltrexone      Also takes Wellbutrin    SUPPLEMENT INFORMATION:  Vit D 5000 IUS/ MWF  Got a MVI " with iron (non-chewable)     Vit D WNL 1/24/22, hx of vit d def    Prefers oral B12 after surgery - does not like needles    NUTRITION HISTORY:  NKFA  Possible lactose intolerance     Pt s friend had sleeve surgery - is familiar with some of it through her. Went well for friend.    Nicotine: Smoked for ~40+ years per pt. Quit cigarettes two weeks ago, using patch and gum. Refer to MTM. None for 3 months prior to surgery    Alcohol: hx of abuse. Sober since May 2021. No surgery until May 2022    Please see previous RD note for more detail.    Sept 2022:  Pt saw PCP last month for concerns regarding symptoms likely related to her hernia.   Checked labs - TSH was high, started medication.      Doing well with dietary goals overall per pt but struggling with giving up pop (having headaches).      Intake unclear.   Likely not getting enough protein - craving meat.      Chewing improving - got teeth yesterday. Had partials before.      PA: walking lots, limited. still having pain with hernia, can t bend over fully.     Oct 2022:  Got sick last month following flu/covid vaccines - vomiting. Also got a UTI and a respiratory infection. Then fell and hurt her thumb. Was on steroids, gained some weight and was eating more.    Doing lots of soup. Adding frozen vegetables.  Or having 1/4 egg salad sandwich.   If having a burger will eat 1/4 of it.  Snacking on grapes, celery and pb, cottage cheese and yogurt.  Doing Ensure occ to help supplement protein intake.     Turned off by pork - found a worm in her pork last month.     Drinking lots of water. No longer drinking pop. Continues to be alcohol free.  fluids from meal time and for 15 minutes post meals.    Nervous about nicotine test - she is nicotine free but her father is smoking 1-2 packs/day in house. Trying to avoid second hand smoke.     PA: walking daily    Nov 2022:  No showed appt 11/11/22 - was sick and her cat was sick.  Had covid. Feeling better  today.  Lost appetite and taste. Food still taste different per pt.   Doing lots of vegetables and homemade soups since they are already pretty bland. Also doing low-sugar/low fat yogurt. Snacking on something light at night time such as a small piece of fruit.    Continues to have an aversion to meat.   Discussed protein options post op on liquid diets. Patient plans to buy some protein shakes for post op. Has ensure regularly right now - aware ensure max protein is needed post op.     Doing well staying hydrated. Found some low-sugar non sparkling flavored beverages.   Did share she misses the crack sound/fizz of opening a pop can.     Recently started meeting with Jefferson Eller, therapist. Going well.     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 Used to be tempted by food she made him but now feels okay not eating.     Had some bread on Thanksgiving but otherwise not having.  Also had some turkey, creamed corn, and a little bit of green bean casserole.   Wanted pumpkin pie but didn't want her sugars to spike so avoided.     PA: walking daily (shorter since it's getting cold but will go walk at store sometimes or up/down stairs in apartment instead)    - Mobility improved. No longer needing her worker to do as much for her as of 3 months ago. She has been able to clean/sweep more herself. Still needs help with laundry.     - Bought a seated peddler and dumbbells for post surgery     Additional information:  Laura Timmons    Lives with and takes care of Dad    Recall Diet Questions Reviewed With Patient 1/19/2022   Describe what you typically consume for breakfast (typical or most recent): I usually don't eat much for breakfast maybe a piece of toast.   Describe what you typically consume for lunch (typical or most recent): Sometimes hot dogs or macaroni and cheese or hamburgers or fast food or eggs or just about anything   Describe what you typically consume for supper (typical or most recent): Always have to have meat with my  dinner potato vegetable if it's corn or green beans but I prefer peas and then I snack at night time before bed because I'm so hungry   Describe what you typically consume as snacks (typical or most recent): Cereal bars chips sometimes fruit a hamburger fast food   How many ounces of water, or other low calorie drinks, do you drink daily (8 oz=1 glass)? 48 oz   How many ounces of caffeine (coffee, tea, pop) do you drink daily (8 oz=1 glass)? 8 oz   How many ounces of carbonated (pop, beer, sparkling water) drinks do you drinky daily (8 oz=1 glass)? 8 oz   How many ounces of juice, pop, sweet tea, sports drinks, protein drinks, other sweetened drinks, do you drink daily (8 oz=1 glass)? 24 oz   How many ounces of milk do you drink daily (8 oz=1 glass) 8 oz   Please indicate the type of milk: 2%   How often do you drink alcohol? Never   If you do drink alcohol, how many drinks might you have in a day? (one drink = 5 oz. wine, 1 can/bottle of beer, 1 shot liquor) -       Eating Habits 1/19/2022   Do you have any dietary restrictions? Yes   Do you currently binge eat (eat a large amount of food in a short time)? -   Are you an emotional eater? No   Do you get up to eat after falling asleep? Yes   What foods do you crave? I crave Pizza macaroni and cheese [employer] hamburger french fries but I do still eat my vegetables     Dining Out History Reviewed With Patient 1/19/2022   How often do you dine out? A couple of times a week.   Where do you dine out? (select all that apply) fast food chains, take out   What types of food do you order when you dine out? Some type of meat potato and of course you got to have a green beans or corn       Physical Activity Reviewed With Patient 2/22/2022   How often do you exercise? Daily   What is the duration of your exercise (in minutes)? 30 Minutes   What types of exercise do you do? walking, other   What keeps you from being more active?  I am as active as I can possbily be       NUTRITION  DIAGNOSIS:  Obesity r/t long history of positive energy balance aeb BMI >30 kg/m2.    INTERVENTION:  Intervention Provided/Education Provided on post-op diet guidelines, vitamins/minerals essential post-operatively, GI anatomy of bariatric surgeries, ways to help prepare for post-op diet guidelines pre-operatively, portion/calorie-control, mindful eating and sources of protein.  Patient demonstrates understanding. Provided pt with list of goals RD contact information.      Questions Reviewed With Patient 1/19/2022   How ready are you to make changes regarding your weight? Number 1 = Not ready at all to make changes up to 10 = very ready. 10   How confident are you that you can change? 1 = Not confident that you will be successful making changes up to 10 = very confident. 10       Expected Engagement: good    GOALS:   Keep up the great work!               - No alcohol              - No smoking              - Seperating fluids from meal time               - Sipping fluids              - Chewing/pace              - No carbonation after surgery   - Staying active (walking, peddler, dumb bells)      Will need 60 grams of protein/day minimum after surgery    Protein Shakes Examples:  Premier Protein   Slim Fast Advanced Nutrition   Muscle Milk, lactose-free  [facility] Core Power   Ensure Max Protein  Boost Max Protein  Orgain  Aldi s Elevation Protein Shake     Protein Powders:   Integrated Supplements, no artificial sugars   Genepro, unflavored protein powder   BiPro  Vital HP Collagen Peptides      Clear Liquid options:  Unflavored protein powders   Bone Broth   Protein Jello  Premier Protein clear (shake)  [id number] (beverage)   Gatorade/Powerade Zero with Protein     Non-meat Protein Ideas    Quinoa    Eggs    Nuts    Beans    Lentils    Protein pasta     Nutritional yeast    Garden of life raw meal powder    Liquid aminos    Homemade meats - a taco meat could be made with chopped cauliflower/mushroom as an example      Hemp hearts      Vegetarian Meal Blog  [email or web address]     High Fiber Foods:  Bran cereal, 1/3 cup, 8.6 gm fiber   Cooked kidney beans   cup 7.9 gm  Cooked lentils   cup 7.8 gm  Cooked black beans   cup 7.6 gm  Canned chickpeas   cup 5.3 gm  Baked beans   cup 5.2 gm  Pear 1 5.1 gm  Soybeans   cup 5.1 gm  Quinoa   cup 5 gm  Baked sweet potato, with skin 1 medium 4.8 gm  Baked potato, with skin 1 medium 4.4 gm  Cooked frozen green peas   cup 4.4 gm  Bulgur   cup 4.1 gm  Cooked frozen mixed vegetables   cup 4 gm  Raspberries   cup 4 gm  Blackberries   cup 3.8 gm  Almonds 1 oz 3.5 gm  Cooked frozen spinach   cup 3.5 gm  Vegetable or soy yony 1 each 3.4 gm  Apple 1 medium 3.3 gm  Dried dates 5 pieces 3.3 gm    Surgery resources:  Diet Guidelines after Weight Loss Surgery  [email or web address]     Your Stage 1 Diet: Clear Liquids  [email or web address]     Your Stage 2 Diet: Low-fat Full Liquids  [email or web address]     Your Stage 3 Diet: Pureed Foods  [email or web address]     Pureed Recipes  [email or web address]    Your Stage 4 Diet: Soft Foods  [email or web address]    Your Stage 5 Diet: Regular Foods  [email or web address]    Follow up:   Tuesday, January 10th at 11:00 am    Time spent with patient: 44 minutes.  SHELLY Neal, RD, LD

## 2022-11-29 NOTE — Clinical Note
Hello!  Please mail pt her AVS and coupons for protein shakes. Marisabel/Sally/Celia/Rashmi/Thuy should be able to help you find the coupons if needed. Thanks!

## 2022-11-29 NOTE — LETTER
11/29/2022       RE: Swetha Patterson  [id number]  [facility] [place] [address]     Dear Colleague,    Thank you for referring your patient, Swetha Patterson, to the [facility] [place] at [facility]. Please see a copy of my visit note below.      Bariatric Nutrition Consultation Note    Reason For Visit: Nutrition Assessment    Swetha Patterson is a 52 year old presenting today for return bariatric nutrition consult.   Pt is interested in laparoscopic sleeve gastrectomy.    Pt has met the minimum required RD appointments but does need formal post op education once she has a surgery date.     Pt referred by NOE Blake on January 20, 2022.    Coordination note:  Needs letter of support from therapist and psychiatrist  - Started seeing Mercedes Whitfield 9/29. Most recently working with Rogers Eller   Needs clearance from sleep medicine - In progress, had to reschedule sleep study due to being sick. Now seeing 1/25/23  Needs PCP letter of support - Done per pt.   Surgeon meet and greet with Dr. Leach - Done 6/9/22   No nicotine for 3 months prior to surgery - Done     Needs Psych eval  - On file 7/6/22 from Dr. Hartman  Sobriety from alcohol for 1 year - May 2022 - Met  Needs baseline labs - Done 1/24/22  10 lb weight loss from initial - Met    Patient with Co-morbidities of obesity including:  Type II DM no but does have pre-diabetes, last A1C 5.8 on 1/24/22  Renal Failure no  Sleep apnea yes  Hypertension yes  Dyslipidemia yes  Joint pain no  Back pain no  GERD no     PMH: fatty liver (nonalcoholic per pt), fibromyalgia, cholecystomy, asthma    Sober since May 2021  Hx of nicotine abuse    Support System Reviewed With Patient 1/19/2022   Who do you have in your support network that can be available to help you for the first 2 weeks after surgery? I have my sister Rukhsana a very close friend of mine Rosalba and Urmila and I have my dad and  "I also have psychiatrist my sister Rukhsana Kearns my dad and my psychiatrist again Rukhsana Cochranie   Who can you count on for support throughout your weight loss surgery journey? Rukhsana Kearns my dad my psychiatrist       ANTHROPOMETRICS:  Consult weight 1/20/22: 247 lbs with BMI 43.75    Estimated body mass index is 35.25 kg/m  as calculated from the following:    Height as of 10/24/22: 1.6 m (5' 3\").    Weight as of 10/24/22: 90.3 kg (199 lb).     Current weight: ~200-203 lbs, pt report    Required weight loss goal pre-op: 10 lbs from initial consult weight (goal weight 237 lbs or less before surgery)       1/19/2022   I have tried the following methods to lose weight Watching portions or calories, Exercise, Physician directed program       Weight Loss Questions Reviewed With Patient 1/19/2022   How long have you been overweight? From Middle age and beyond     MEDICATIONS FOR WEIGHT LOSS:  Semaglutide  Naltrexone      Also takes Wellbutrin    SUPPLEMENT INFORMATION:  Vit D 5000 IUS/ MWF  Got a MVI with iron (non-chewable)     Vit D WNL 1/24/22, hx of vit d def    Prefers oral B12 after surgery - does not like needles    NUTRITION HISTORY:  NKFA  Possible lactose intolerance     Pt s friend had sleeve surgery - is familiar with some of it through her. Went well for friend.    Nicotine: Smoked for ~40+ years per pt. Quit cigarettes two weeks ago, using patch and gum. Refer to MTM. None for 3 months prior to surgery    Alcohol: hx of abuse. Sober since May 2021. No surgery until May 2022    Please see previous RD note for more detail.    Sept 2022:  Pt saw PCP last month for concerns regarding symptoms likely related to her hernia.   Checked labs - TSH was high, started medication.      Doing well with dietary goals overall per pt but struggling with giving up pop (having headaches).      Intake unclear.   Likely not getting enough protein - craving meat.      Chewing improving - got teeth yesterday. Had " partials before.      PA: walking lots, limited. still having pain with hernia, can t bend over fully.     Oct 2022:  Got sick last month following flu/covid vaccines - vomiting. Also got a UTI and a respiratory infection. Then fell and hurt her thumb. Was on steroids, gained some weight and was eating more.    Doing lots of soup. Adding frozen vegetables.  Or having 1/4 egg salad sandwich.   If having a burger will eat 1/4 of it.  Snacking on grapes, celery and pb, cottage cheese and yogurt.  Doing Ensure occ to help supplement protein intake.     Turned off by pork - found a worm in her pork last month.     Drinking lots of water. No longer drinking pop. Continues to be alcohol free.  fluids from meal time and for 15 minutes post meals.    Nervous about nicotine test - she is nicotine free but her father is smoking 1-2 packs/day in house. Trying to avoid second hand smoke.     PA: walking daily    Nov 2022:  No showed appt 11/11/22 - was sick and her cat was sick.  Had covid. Feeling better today.  Lost appetite and taste. Food still taste different per pt.   Doing lots of vegetables and homemade soups since they are already pretty bland. Also doing low-sugar/low fat yogurt. Snacking on something light at night time such as a small piece of fruit.    Continues to have an aversion to meat.   Discussed protein options post op on liquid diets. Patient plans to buy some protein shakes for post op. Has ensure regularly right now - aware ensure max protein is needed post op.     Doing well staying hydrated. Found some low-sugar non sparkling flavored beverages.   Did share she misses the crack sound/fizz of opening a pop can.     Recently started meeting with Jefferson Eller, therapist. Going well.     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 Used to be tempted by food she made him but now feels okay not eating.     Had some bread on Thanksgiving but otherwise not having.  Also had some turkey, creamed corn, and a little  bit of green bean casserole.   Wanted pumpkin pie but didn't want her sugars to spike so avoided.     PA: walking daily (shorter since it's getting cold but will go walk at store sometimes or up/down stairs in apartment instead)    - Mobility improved. No longer needing her worker to do as much for her as of 3 months ago. She has been able to clean/sweep more herself. Still needs help with laundry.     - Bought a seated peddler and dumbbells for post surgery     Additional information:  aLura Timmons    Lives with and takes care of Dad    Recall Diet Questions Reviewed With Patient 1/19/2022   Describe what you typically consume for breakfast (typical or most recent): I usually don't eat much for breakfast maybe a piece of toast.   Describe what you typically consume for lunch (typical or most recent): Sometimes hot dogs or macaroni and cheese or hamburgers or fast food or eggs or just about anything   Describe what you typically consume for supper (typical or most recent): Always have to have meat with my dinner potato vegetable if it's corn or green beans but I prefer peas and then I snack at night time before bed because I'm so hungry   Describe what you typically consume as snacks (typical or most recent): Cereal bars chips sometimes fruit a hamburger fast food   How many ounces of water, or other low calorie drinks, do you drink daily (8 oz=1 glass)? 48 oz   How many ounces of caffeine (coffee, tea, pop) do you drink daily (8 oz=1 glass)? 8 oz   How many ounces of carbonated (pop, beer, sparkling water) drinks do you drinky daily (8 oz=1 glass)? 8 oz   How many ounces of juice, pop, sweet tea, sports drinks, protein drinks, other sweetened drinks, do you drink daily (8 oz=1 glass)? 24 oz   How many ounces of milk do you drink daily (8 oz=1 glass) 8 oz   Please indicate the type of milk: 2%   How often do you drink alcohol? Never   If you do drink alcohol, how many drinks might you have in a day? (one drink = 5  oz. wine, 1 can/bottle of beer, 1 shot liquor) -       Eating Habits 1/19/2022   Do you have any dietary restrictions? Yes   Do you currently binge eat (eat a large amount of food in a short time)? -   Are you an emotional eater? No   Do you get up to eat after falling asleep? Yes   What foods do you crave? I crave Pizza macaroni and cheese [employer] hamburger french fries but I do still eat my vegetables     Dining Out History Reviewed With Patient 1/19/2022   How often do you dine out? A couple of times a week.   Where do you dine out? (select all that apply) fast food chains, take out   What types of food do you order when you dine out? Some type of meat potato and of course you got to have a green beans or corn       Physical Activity Reviewed With Patient 2/22/2022   How often do you exercise? Daily   What is the duration of your exercise (in minutes)? 30 Minutes   What types of exercise do you do? walking, other   What keeps you from being more active?  I am as active as I can possbily be       NUTRITION DIAGNOSIS:  Obesity r/t long history of positive energy balance aeb BMI >30 kg/m2.    INTERVENTION:  Intervention Provided/Education Provided on post-op diet guidelines, vitamins/minerals essential post-operatively, GI anatomy of bariatric surgeries, ways to help prepare for post-op diet guidelines pre-operatively, portion/calorie-control, mindful eating and sources of protein.  Patient demonstrates understanding. Provided pt with list of goals RD contact information.      Questions Reviewed With Patient 1/19/2022   How ready are you to make changes regarding your weight? Number 1 = Not ready at all to make changes up to 10 = very ready. 10   How confident are you that you can change? 1 = Not confident that you will be successful making changes up to 10 = very confident. 10       Expected Engagement: good    GOALS:   Keep up the great work!               - No alcohol              - No smoking              -  Seperating fluids from meal time               - Sipping fluids              - Chewing/pace              - No carbonation after surgery   - Staying active (walking, peddler, dumb bells)      Will need 60 grams of protein/day minimum after surgery    Protein Shakes Examples:  Premier Protein   Slim Fast Advanced Nutrition   Muscle Milk, lactose-free  Fairlife Core Power   Ensure Max Protein  Boost Max Protein  Orgain  Aldi s Elevation Protein Shake     Protein Powders:   Integrated Supplements, no artificial sugars   Genepro, unflavored protein powder   BiPro  Vital HP Collagen Peptides      Clear Liquid options:  Unflavored protein powders   Bone Broth   Protein Jello  Premier Protein clear (shake)  [id number] (beverage)   Gatorade/Powerade Zero with Protein     Non-meat Protein Ideas    Quinoa    Eggs    Nuts    Beans    Lentils    Protein pasta     Nutritional yeast    Garden of life raw meal powder    Liquid aminos    Homemade meats - a taco meat could be made with chopped cauliflower/mushroom as an example     Hemp hearts      Vegetarian Meal Blog  [email or web address]     High Fiber Foods:  Bran cereal, 1/3 cup, 8.6 gm fiber   Cooked kidney beans   cup 7.9 gm  Cooked lentils   cup 7.8 gm  Cooked black beans   cup 7.6 gm  Canned chickpeas   cup 5.3 gm  Baked beans   cup 5.2 gm  Pear 1 5.1 gm  Soybeans   cup 5.1 gm  Quinoa   cup 5 gm  Baked sweet potato, with skin 1 medium 4.8 gm  Baked potato, with skin 1 medium 4.4 gm  Cooked frozen green peas   cup 4.4 gm  Bulgur   cup 4.1 gm  Cooked frozen mixed vegetables   cup 4 gm  Raspberries   cup 4 gm  Blackberries   cup 3.8 gm  Almonds 1 oz 3.5 gm  Cooked frozen spinach   cup 3.5 gm  Vegetable or soy yony 1 each 3.4 gm  Apple 1 medium 3.3 gm  Dried dates 5 pieces 3.3 gm    Surgery resources:  Diet Guidelines after Weight Loss Surgery  [email or web address]     Your Stage 1 Diet: Clear Liquids  [email or web address]      Your Stage 2 Diet: Low-fat Full Liquids  [email or web address]     Your Stage 3 Diet: Pureed Foods  [email or web address]     Pureed Recipes  [email or web address]    Your Stage 4 Diet: Soft Foods  [email or web address]    Your Stage 5 Diet: Regular Foods  [email or web address]    Follow up:   Tuesday, January 10th at 11:00 am    Time spent with patient: 44 minutes.    Rika Mejía, SHELLY, RD, LD

## 2022-12-06 ENCOUNTER — TELEPHONE (OUTPATIENT)
Dept: INTERNAL MEDICINE | Facility: CLINIC | Age: 52
End: 2022-12-06

## 2022-12-06 DIAGNOSIS — E11.9 TYPE 2 DIABETES MELLITUS WITHOUT COMPLICATION, WITHOUT LONG-TERM CURRENT USE OF INSULIN (H): ICD-10-CM

## 2022-12-07 RX ORDER — ORAL SEMAGLUTIDE 14 MG/1
TABLET ORAL
Qty: 30 TABLET | Refills: 3 | Status: SHIPPED | OUTPATIENT
Start: 2022-12-07 | End: 2023-05-17

## 2022-12-10 ENCOUNTER — HEALTH MAINTENANCE LETTER (OUTPATIENT)
Age: 52
End: 2022-12-10

## 2022-12-13 ASSESSMENT — ANXIETY QUESTIONNAIRES
7. FEELING AFRAID AS IF SOMETHING AWFUL MIGHT HAPPEN: SEVERAL DAYS
8. IF YOU CHECKED OFF ANY PROBLEMS, HOW DIFFICULT HAVE THESE MADE IT FOR YOU TO DO YOUR WORK, TAKE CARE OF THINGS AT HOME, OR GET ALONG WITH OTHER PEOPLE?: SOMEWHAT DIFFICULT
GAD7 TOTAL SCORE: 17
6. BECOMING EASILY ANNOYED OR IRRITABLE: MORE THAN HALF THE DAYS
7. FEELING AFRAID AS IF SOMETHING AWFUL MIGHT HAPPEN: SEVERAL DAYS
GAD7 TOTAL SCORE: 17
5. BEING SO RESTLESS THAT IT IS HARD TO SIT STILL: NEARLY EVERY DAY
4. TROUBLE RELAXING: NEARLY EVERY DAY
GAD7 TOTAL SCORE: 17
2. NOT BEING ABLE TO STOP OR CONTROL WORRYING: NEARLY EVERY DAY
IF YOU CHECKED OFF ANY PROBLEMS ON THIS QUESTIONNAIRE, HOW DIFFICULT HAVE THESE PROBLEMS MADE IT FOR YOU TO DO YOUR WORK, TAKE CARE OF THINGS AT HOME, OR GET ALONG WITH OTHER PEOPLE: SOMEWHAT DIFFICULT
1. FEELING NERVOUS, ANXIOUS, OR ON EDGE: NEARLY EVERY DAY
3. WORRYING TOO MUCH ABOUT DIFFERENT THINGS: MORE THAN HALF THE DAYS

## 2022-12-16 ENCOUNTER — VIRTUAL VISIT (OUTPATIENT)
Dept: PSYCHOLOGY | Facility: CLINIC | Age: 52
End: 2022-12-16
Payer: COMMERCIAL

## 2022-12-16 DIAGNOSIS — F31.62 BIPOLAR DISORDER, CURRENT EPISODE MIXED, MODERATE (H): Primary | ICD-10-CM

## 2022-12-16 PROCEDURE — 90791 PSYCH DIAGNOSTIC EVALUATION: CPT | Mod: 95 | Performed by: SOCIAL WORKER

## 2022-12-16 ASSESSMENT — PATIENT HEALTH QUESTIONNAIRE - PHQ9
SUM OF ALL RESPONSES TO PHQ QUESTIONS 1-9: 13
SUM OF ALL RESPONSES TO PHQ QUESTIONS 1-9: 13

## 2022-12-16 NOTE — PROGRESS NOTES
"    [facility]      PATIENT'S NAME: Swetha Patterson  PREFERRED NAME: Swetha  PRONOUNS: She, her, hers  MRN: [id number]  : 1970  ADDRESS: [address]  [facility] [place] [address]  ACCT. NUMBER:  [id number]  DATE OF SERVICE: 22  START TIME: 11:30am  END TIME: 12:15pm  PREFERRED PHONE: [phone]  May we leave a program related message: Yes  SERVICE MODALITY:  Video Visit:      Provider verified identity through the following two step process.  Patient provided:  Patient     Telemedicine Visit: The patient's condition can be safely assessed and treated via synchronous audio and visual telemedicine encounter.      Reason for Telemedicine Visit: Patient has requested telehealth visit    Originating Site (Patient Location): Patient's home    Distant Site (Provider Location): Provider Remote Setting- Home Office    Consent:  The patient/guardian has verbally consented to: the potential risks and benefits of telemedicine (video visit) versus in person care; bill my insurance or make self-payment for services provided; and responsibility for payment of non-covered services.     Patient would like the video invitation sent by:  My Chart    Mode of Communication:  Video Conference via [employer]    Distant Location (Provider):  Off-site    As the provider I attest to compliance with applicable laws and regulations related to telemedicine.    UNIVERSAL ADULT Mental Health DIAGNOSTIC ASSESSMENT    Identifying Information:  Patient is a 52 year old,  ;  individual.  Patient was referred for an assessment by primary care clinic.  Patient attended the session alone.    Chief Complaint:   The reason for seeking services at this time is: \" I need help with mood management and anxiety management \"   The problem(s) began in childhood. Patient has not attempted to resolve these concerns in the past.    Social/Family History:  Patient reported they grew up in [place], [place].  They " "were raised by biological mother. Mother passed away a number of years ago.  Parents stayed . Has a Step Dad who she takes care of. Patient reported that their childhood was \" I had to grow up quick.\"  Patient described their current relationships with family of origin as close with sibblings.      The patient describes their cultural background as .  Cultural influences and impact on patient's life structure, values, norms, and healthcare: Cultural Traditions.  Contextual influences on patient's health include: Contextual Factors: Economic Factors grew up with poverty.  Cultural, Contextual, and socioeconomic factors do affect the patient's access to services.  These factors will be addressed in the Preliminary Treatment plan.  Patient identified their preferred language to be English. Patient reported they do not  need the assistance of an  or other support involved in therapy.     Patient reported had no significant delays in developmental tasks.   Patient's highest education level was some high school but no degree. Patient identified the following learning problems: none reported.  Modifications will not be used to assist communication in therapy.   Patient reports they are  able to understand written materials.    Patient reported the following relationship history .  Patient's current relationship status is  for 2 years.   Patient identified their sexual orientation as heterosexual.  Patient reported having zero child(rashawn). Patient identified siblings and friends as part of their support system.  Patient identified the quality of these relationships as good.     Patient's current living/housing situation involves staying in own home/apartment.  They live with their Step Father, who she takes care of, and they report that housing is not stable.     Patient is currently disabled.  Patient reports their finances are obtained through SSDI disability.  Patient does identify " finances as a current stressor.      Patient reported that they have been involved with the legal system.  Patient denies being on probation / parole / under the jurisdiction of the court.      Patient's Strengths and Limitations:  Patient identified the following strengths or resources that will help them succeed in treatment: community involvement and family support. Things that may interfere with the patient's success in treatment include: none identified.     Assessments:  The following assessments were completed by patient for this visit:  PHQ9:   PHQ-9 SCORE 2/20/2020 2/20/2020 3/6/2020 8/17/2022 9/28/2022 11/22/2022 12/16/2022   PHQ-9 Total Score - - - - - - -   PHQ-9 Total Score MyChart - 17 (Moderately severe depression) - 5 (Mild depression) 9 (Mild depression) 12 (Moderate depression) 13 (Moderate depression)   PHQ-9 Total Score 17 15 16 5 - 12 13   Some encounter information is confidential and restricted. Go to Review [employer] activity to see all data.     GAD7:   GIDEON-7 SCORE 1/10/2019 5/2/2019 4/3/2020 3/31/2022 9/28/2022 11/22/2022 12/13/2022   Total Score - - - - - - -   Total Score 16 (severe anxiety) - - - 11 (moderate anxiety) 16 (severe anxiety) 17 (severe anxiety)   Total Score 16 21 14 6 - 16 17   Some encounter information is confidential and restricted. Go to Review [employer] activity to see all data.     PROMIS 10-Global Health (only subscores and total score):   PROMIS-10 Scores Only 11/9/2017 9/11/2021 9/11/2021 10/21/2021 2/22/2022   Global Mental Health Score 8 7 7 7 16   Global Physical Health Score 10 9 9 9 16   PROMIS TOTAL - SUBSCORES 18 16 16 16 32   Some encounter information is confidential and restricted. Go to Review [employer] activity to see all data.     [place] Suicide Severity Rating Scale (Lifetime/Recent)  [place] Suicide Severity Rating (Lifetime/Recent) 12/26/2018 1/2/2019 11/23/2022   Q1 Wished to be Dead (Past Month) no no -   Q2 Suicidal Thoughts (Past Month) no  no -   Q6 Suicide Behavior (Lifetime) - no -   1. Wish to be Dead (Lifetime) - - 0   2. Non-Specific Active Suicidal Thoughts (Lifetime) - - 0   Has subject engaged in non-suicidal self-injurious behavior? (Lifetime) - - 1       Personal and Family Medical History:  Patient does report a family history of mental health concerns.  Patient reports family history includes Asthma in her maternal grandfather, mother, and sister; Cerebrovascular Disease in her maternal grandfather, maternal grandmother, mother, and sister; Depression in her mother; Diabetes in her maternal grandfather; Family History Negative in her brother and father; Gallbladder Disease in her mother; Heart Disease in her maternal grandfather and mother; Hypertension in her maternal grandfather, maternal grandmother, mother, and sister..     Patient does report Mental Health Diagnosis and/or Treatment.  Patient Patient reported the following previous diagnoses which include(s): a Bipolar Disorder.  Patient reported symptoms began in childhood.   Patient has not received mental health services in the past: primary care provider at [facility]..  Psychiatric Hospitalizations: None.  Patient denies a history of civil commitment.  Patient is not receiving other mental health services.  These include none.         Patient has had a physical exam to rule out medical causes for current symptoms.  Date of last physical exam was within the past year. Client was encouraged to follow up with PCP if symptoms were to develop. The patient has a [facility] Primary Care Provider, who is named Roro Chawla..  Patient reports the following current medical concerns: See [facility] care.  Patient denies any issues with pain..   There are significant appetite / nutritional concerns / weight changes. These may include: comfort/stress eating , binge eating w/o purging  and gain of weight. Patient reports the following sleep concerns:  No concerns.   Patient does  "not report a history of head injury / trauma / cognitive impairment.          Medication Adherence:  Patient reports taking prescribed medications as prescribed.    Patient Allergies:    Allergies   Allergen Reactions     Codeine Nausea and Vomiting     vomiting     Cyclobenzaprine Nausea     Gabapentin Rash     Hydrocodone Nausea and Vomiting     Sulfa Drugs Nausea and Vomiting     Nausea       Medical History:    Past Medical History:   Diagnosis Date     Anxiety state, unspecified      Asthma      Chronic pain     back pain from cyst     Contact dermatitis and other eczema, due to unspecified cause      COPD (chronic obstructive pulmonary disease) (H)      Depressive disorder, not elsewhere classified      Diabetes (H)      Emphysema with chronic bronchitis (H)      Esophageal reflux      Family history of ischemic heart disease      Fibromyalgia      Gastro-oesophageal reflux disease      Heart disease     has chest pain sometimes     History of emphysema      Hoarseness      HTN, goal below 140/90      Hyperlipidemia LDL goal <130      Liver disease     \"fatty liver\"     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)      PONV (postoperative nausea and vomiting)      Rectal bleeding          Current Mental Status Exam:   Appearance:  Appropriate    Eye Contact:  Good   Psychomotor:  Normal       Gait / station:  no problem  Attitude / Demeanor: Cooperative   Speech      Rate / Production: Normal/ Responsive      Volume:  Normal  volume      Language:  intact  Mood:   Anxious  Depressed   Affect:   Appropriate    Thought Content: Clear   Thought Process: Coherent       Associations: Flight of ideas  Insight:   Good   Judgment:  Intact   Orientation:  Person Place Time Situation All  Attention/concentration: Good      Substance Use:  Patient did report a family history of substance use concerns; see medical history section for details.  Patient has received chemical dependency treatment in the past at [facility].  " Patient has ever been to detox.      Patient is not currently receiving any chemical dependency treatment. Patient reported the following problems as a result of their substance use:  legal issues.    Patient denies using alcohol.  Patient denies using tobacco.  Patient denies using cannabis.  Patient denies using caffeine.  Patient reports using/abusing the following substance(s). Patient reported no other substance use.     Substance Use: No symptoms    Based on the negative CAGE score and clinical interview there  are not indications of drug or alcohol abuse.      Significant Losses / Trauma / Abuse / Neglect Issues:   Patient did not  serve in the .  There are indications or report of significant loss, trauma, abuse or neglect issues related to: are no indications and client denies any losses, trauma, abuse, or neglect concerns.  Concerns for possible neglect are not present.     Safety Assessment:   Patient denies current homicidal ideation and behaviors.  Patient denies current self-injurious ideation and behaviors.    Patient denied risk behaviors associated with substance use.  Patient denies any high risk behaviors associated with mental health symptoms.  Patient reports the following current concerns for their personal safety: None.  Patient reports there are not firearms in the house.       There are no firearms in the home..    History of Safety Concerns:  Patient denied a history of homicidal ideation.     Patient denied a history of personal safety concerns.    Patient denied a history of assaultive behaviors.    Patient denied a history of sexual assault behaviors.     Patient denied a history of risk behaviors associated with substance use.  Patient denies any history of high risk behaviors associated with mental health symptoms.  Patient reports the following protective factors: dedication to family or friends; regular physical activity; sense of belonging; adherence with prescribed  medication; sense of meaning; strong sense of self worth or esteem; sense of personal control or determination    Risk Plan:  See Recommendations for Safety and Risk Management Plan    Review of Symptoms per patient report:   Depression: Lack of interest, Change in energy level, Difficulties concentrating, Change in appetite and Feelings of hopelessness  Lotus:  Elevated mood, Irritability and Racing thoughts  Psychosis: No Symptoms  Anxiety: Excessive worry, Nervousness and Social anxiety  Panic:  No symptoms  Post Traumatic Stress Disorder:  No Symptoms   Eating Disorder: No Symptoms  ADD / ADHD:  No symptoms  Conduct Disorder: No symptoms  Autism Spectrum Disorder: No symptoms  Obsessive Compulsive Disorder: No Symptoms    Patient reports the following compulsive behaviors and treatment history: none reported.      Diagnostic Criteria:   Bipolar I Manic Episode   - inflated self-esteem or grandiosity    - decreased need for sleep (e.g., feels rested after only 3 hours of sleep)    - more talkative than usual or pressure to keep talking    - flight of ideas or subjectivie experience that thoughts are racing   - distractibility   - increase in goal-directed activity   - excessive involvement in pleasurable activities that have a high potential for painful consequences, such as spending money or sexual indiscretion.  C. The mood disturbance is sufficiently severe to cause marked impairment in social or occupational functioning or to necessitate hospitalization to prevent harm to self or others, or there are severe psychotic features    Functional Status:  Patient reports the following functional impairments:  home life with her Step Father.     Nonprogrammatic care:  Patient is requesting basic services to address current mental health concerns.    Clinical Summary:  1. Reason for assessment: mood and anxiety mamngment  .  2. Psychosocial, Cultural and Contextual Factors:  Traditions .  3. Principal  DSM5 Diagnoses  (Sustained by DSM5 Criteria Listed Above):   296.44 Bipolar I Disorder Current or Most Recent Episode Manic, with psycholtic features..    6. Prognosis: Expect Improvement.  7. Likely consequences of symptoms if not treated: Flare up of Symptoms  8. Client strengths include:  caring, committed to sobriety and wants to learn .     Recommendations:     1. Plan for Safety and Risk Management:   Safety and Risk: Recommended that patient call 911 or go to the local ED should there be a change in any of these risk factors..          Report to child / adult protection services was NA.     2. Patient's identified cultural concerns will be addressed by Learning more about her culture.     3. Initial Treatment will focus on:    Anxiety - Help her to manage her anxiety related to having weight loss surgery.     4. Resources/Service Plan:    services are not indicated.   Modifications to assist communication are not indicated.   Additional disability accommodations are not indicated.      5. Collaboration:   Collaboration / coordination of treatment will be initiated with the following  support professionals: psychiatry.      6.  Referrals:   The following referral(s) will be initiated: Psychiatry. Next Scheduled Appointment: 1/20/23     A Release of Information has been obtained for the following: none at this time.     Emergency Contact  was not obtained.      Clinical Substantiation/medical necessity for the above recommendations:  none.    7. LISA:    LISA:  Discussed the general effects of drugs and alcohol on health and well-being. Provider gave patient printed information about the  effects of chemical use on their health and well being. Recommendations:  Maintain Sober living .     8. Records:   These were reviewed at time of assessment.   Information in this assessment was obtained from the medical record and  provided by patient who is a good historian.    Patient will have open access to  their mental health medical record.    9.   Interactive Complexity: No      Provider Name/ Credentials:  Rogers Eller  [facility]  December 16, 2022

## 2022-12-21 DIAGNOSIS — R21 RASH: ICD-10-CM

## 2022-12-21 DIAGNOSIS — F41.9 ANXIETY: ICD-10-CM

## 2022-12-21 RX ORDER — HYDROXYZINE HYDROCHLORIDE 50 MG/1
TABLET, FILM COATED ORAL
Qty: 70 TABLET | Refills: 9 | Status: SHIPPED | OUTPATIENT
Start: 2022-12-21 | End: 2023-07-12

## 2022-12-21 RX ORDER — NYSTATIN 100000 [USP'U]/G
POWDER TOPICAL
Qty: 45 G | Refills: 9 | Status: SHIPPED | OUTPATIENT
Start: 2022-12-21 | End: 2024-08-28

## 2022-12-23 ENCOUNTER — HOSPITAL ENCOUNTER (EMERGENCY)
Facility: CLINIC | Age: 52
Discharge: HOME | End: 2022-12-23
Attending: EMERGENCY MEDICINE | Admitting: EMERGENCY MEDICINE
Payer: COMMERCIAL

## 2022-12-23 VITALS
HEART RATE: 75 BPM | TEMPERATURE: 98.4 F | DIASTOLIC BLOOD PRESSURE: 49 MMHG | SYSTOLIC BLOOD PRESSURE: 103 MMHG | OXYGEN SATURATION: 96 % | RESPIRATION RATE: 30 BRPM

## 2022-12-23 DIAGNOSIS — F41.9 ANXIETY: ICD-10-CM

## 2022-12-23 DIAGNOSIS — R10.9 NONSPECIFIC ABDOMINAL PAIN: ICD-10-CM

## 2022-12-23 DIAGNOSIS — R19.5 NONSPECIFIC ABNORMAL FINDING IN STOOL CONTENTS: ICD-10-CM

## 2022-12-23 DIAGNOSIS — M54.50 ACUTE MIDLINE LOW BACK PAIN WITHOUT SCIATICA: ICD-10-CM

## 2022-12-23 LAB
ALBUMIN UR-MCNC: NEGATIVE MG/DL
AMPHETAMINES UR QL SCN: NORMAL
ANION GAP SERPL CALCULATED.3IONS-SCNC: 13 MMOL/L (ref 7–15)
APPEARANCE UR: CLEAR
BACTERIA #/AREA URNS HPF: ABNORMAL /HPF
BARBITURATES UR QL SCN: NORMAL
BASOPHILS # BLD AUTO: 0 10E3/UL (ref 0–0.2)
BASOPHILS NFR BLD AUTO: 0 %
BENZODIAZ UR QL SCN: NORMAL
BILIRUB UR QL STRIP: NEGATIVE
BUN SERPL-MCNC: 17.4 MG/DL (ref 6–20)
BZE UR QL SCN: NORMAL
CALCIUM SERPL-MCNC: 9.2 MG/DL (ref 8.6–10)
CANNABINOIDS UR QL SCN: NORMAL
CHLORIDE SERPL-SCNC: 101 MMOL/L (ref 98–107)
COLOR UR AUTO: ABNORMAL
CREAT SERPL-MCNC: 0.96 MG/DL (ref 0.51–0.95)
DEPRECATED HCO3 PLAS-SCNC: 24 MMOL/L (ref 22–29)
EOSINOPHIL # BLD AUTO: 0 10E3/UL (ref 0–0.7)
EOSINOPHIL NFR BLD AUTO: 0 %
ERYTHROCYTE [DISTWIDTH] IN BLOOD BY AUTOMATED COUNT: 17.2 % (ref 10–15)
GFR SERPL CREATININE-BSD FRML MDRD: 70 ML/MIN/1.73M2
GLUCOSE SERPL-MCNC: 104 MG/DL (ref 70–99)
GLUCOSE UR STRIP-MCNC: NEGATIVE MG/DL
HCG UR QL: NEGATIVE
HCT VFR BLD AUTO: 36.3 % (ref 35–47)
HGB BLD-MCNC: 11.7 G/DL (ref 11.7–15.7)
HGB UR QL STRIP: NEGATIVE
IMM GRANULOCYTES # BLD: 0.1 10E3/UL
IMM GRANULOCYTES NFR BLD: 1 %
KETONES UR STRIP-MCNC: NEGATIVE MG/DL
LEUKOCYTE ESTERASE UR QL STRIP: ABNORMAL
LYMPHOCYTES # BLD AUTO: 2.4 10E3/UL (ref 0.8–5.3)
LYMPHOCYTES NFR BLD AUTO: 27 %
MCH RBC QN AUTO: 27.1 PG (ref 26.5–33)
MCHC RBC AUTO-ENTMCNC: 32.2 G/DL (ref 31.5–36.5)
MCV RBC AUTO: 84 FL (ref 78–100)
MONOCYTES # BLD AUTO: 0.6 10E3/UL (ref 0–1.3)
MONOCYTES NFR BLD AUTO: 7 %
NEUTROPHILS # BLD AUTO: 5.8 10E3/UL (ref 1.6–8.3)
NEUTROPHILS NFR BLD AUTO: 65 %
NITRATE UR QL: NEGATIVE
NRBC # BLD AUTO: 0 10E3/UL
NRBC BLD AUTO-RTO: 0 /100
OPIATES UR QL SCN: NORMAL
PH UR STRIP: 5.5 [PH] (ref 5–7)
PLATELET # BLD AUTO: 297 10E3/UL (ref 150–450)
POTASSIUM SERPL-SCNC: 4.2 MMOL/L (ref 3.4–5.3)
RBC # BLD AUTO: 4.32 10E6/UL (ref 3.8–5.2)
RBC URINE: 3 /HPF
SODIUM SERPL-SCNC: 138 MMOL/L (ref 136–145)
SP GR UR STRIP: 1.01 (ref 1–1.03)
SQUAMOUS EPITHELIAL: 3 /HPF
UROBILINOGEN UR STRIP-MCNC: NORMAL MG/DL
WBC # BLD AUTO: 9 10E3/UL (ref 4–11)
WBC URINE: 24 /HPF

## 2022-12-23 PROCEDURE — 250N000011 HC RX IP 250 OP 636: Performed by: EMERGENCY MEDICINE

## 2022-12-23 PROCEDURE — 36415 COLL VENOUS BLD VENIPUNCTURE: CPT | Performed by: EMERGENCY MEDICINE

## 2022-12-23 PROCEDURE — 81025 URINE PREGNANCY TEST: CPT | Performed by: EMERGENCY MEDICINE

## 2022-12-23 PROCEDURE — 85014 HEMATOCRIT: CPT | Performed by: EMERGENCY MEDICINE

## 2022-12-23 PROCEDURE — 258N000003 HC RX IP 258 OP 636: Performed by: EMERGENCY MEDICINE

## 2022-12-23 PROCEDURE — 99285 EMERGENCY DEPT VISIT HI MDM: CPT | Mod: 25

## 2022-12-23 PROCEDURE — 96374 THER/PROPH/DIAG INJ IV PUSH: CPT

## 2022-12-23 PROCEDURE — 96372 THER/PROPH/DIAG INJ SC/IM: CPT | Performed by: EMERGENCY MEDICINE

## 2022-12-23 PROCEDURE — 87086 URINE CULTURE/COLONY COUNT: CPT | Performed by: EMERGENCY MEDICINE

## 2022-12-23 PROCEDURE — 80048 BASIC METABOLIC PNL TOTAL CA: CPT | Performed by: EMERGENCY MEDICINE

## 2022-12-23 PROCEDURE — 80307 DRUG TEST PRSMV CHEM ANLYZR: CPT | Performed by: EMERGENCY MEDICINE

## 2022-12-23 PROCEDURE — 96375 TX/PRO/DX INJ NEW DRUG ADDON: CPT

## 2022-12-23 PROCEDURE — 81001 URINALYSIS AUTO W/SCOPE: CPT | Performed by: EMERGENCY MEDICINE

## 2022-12-23 PROCEDURE — 96361 HYDRATE IV INFUSION ADD-ON: CPT

## 2022-12-23 RX ORDER — OLANZAPINE 10 MG/2ML
5 INJECTION, POWDER, FOR SOLUTION INTRAMUSCULAR ONCE
Status: COMPLETED | OUTPATIENT
Start: 2022-12-23 | End: 2022-12-23

## 2022-12-23 RX ORDER — SODIUM CHLORIDE 9 MG/ML
INJECTION, SOLUTION INTRAVENOUS CONTINUOUS
Status: DISCONTINUED | OUTPATIENT
Start: 2022-12-23 | End: 2022-12-23 | Stop reason: HOSPADM

## 2022-12-23 RX ORDER — KETOROLAC TROMETHAMINE 15 MG/ML
15 INJECTION, SOLUTION INTRAMUSCULAR; INTRAVENOUS ONCE
Status: COMPLETED | OUTPATIENT
Start: 2022-12-23 | End: 2022-12-23

## 2022-12-23 RX ORDER — FENTANYL CITRATE 50 UG/ML
50 INJECTION, SOLUTION INTRAMUSCULAR; INTRAVENOUS ONCE
Status: COMPLETED | OUTPATIENT
Start: 2022-12-23 | End: 2022-12-23

## 2022-12-23 RX ADMIN — SODIUM CHLORIDE 1000 ML: 9 INJECTION, SOLUTION INTRAVENOUS at 19:47

## 2022-12-23 RX ADMIN — FENTANYL CITRATE 50 MCG: 50 INJECTION, SOLUTION INTRAMUSCULAR; INTRAVENOUS at 21:42

## 2022-12-23 RX ADMIN — OLANZAPINE 5 MG: 10 INJECTION, POWDER, FOR SOLUTION INTRAMUSCULAR at 19:47

## 2022-12-23 RX ADMIN — KETOROLAC TROMETHAMINE 15 MG: 15 INJECTION, SOLUTION INTRAMUSCULAR; INTRAVENOUS at 19:46

## 2022-12-23 ASSESSMENT — ENCOUNTER SYMPTOMS
FREQUENCY: 0
BACK PAIN: 1
NAUSEA: 1
NERVOUS/ANXIOUS: 1
DIARRHEA: 1
ABDOMINAL PAIN: 1
FEVER: 0
DYSURIA: 0

## 2022-12-23 ASSESSMENT — ACTIVITIES OF DAILY LIVING (ADL)
ADLS_ACUITY_SCORE: 35
ADLS_ACUITY_SCORE: 35

## 2022-12-23 NOTE — TELEPHONE ENCOUNTER
Mental health referral done   See primary care provider for recheck on Monday and sutures out in 7 days  Avoid salty, seedy foods or foods with that are acidic  Rinse mouth after all meals  Augmentin as prescribed  Over-the-counter probiotics while on Augmentin  Return for purulent drainage, increasing pain, increasing redness, worsening symptoms, new symptoms or changes or concerns  Acetaminophen 160 mg per 5 mL give 20 mL and ibuprofen 100 mg chewable tablets 5 tablets may alternate every 3 hours as needed for pain

## 2022-12-24 NOTE — ED PROVIDER NOTES
"  History   Chief Complaint:  Abdominal Pain and Diarrhea     The history is provided by the patient.      Swetha Patterson is a 52 year old female with history of hypertension, hyperlipidemia, GERD, and anxiety who presents with diffuse lower abdominal pain radiating to her bilateral lower back persisting for the last couple of hours, also with concern for \"bugs\" in her diarrheal stool earlier today. Over the last few days the patient has been constipated, so yesterday her primary care provider prescribed her Miralax for relief. Earlier this afternoon, she reports some new nausea and pain to her abdomen and back, and shortly after (approximately 2 hours ago) she had a single episode of diarrhea. As she looked at her stool in the toilet, she states that there were \"bugs\" in it with \"little black legs.\" Concern that \"I don't know what it is and I'm scared\" prompted her presentation to ED at this time. Presently, she states her back pain is constant and severe and notes intermittent milder pain to her abdomen with some persisting nausea. She otherwise denies any fever, dysuria, or urinary frequency. The patient has history of \"lung disease,\" GERD, and other \"stomach problems.\" She reports taking several prescribed medication daily. She does have upcoming bariatric surgery so denies recent drug or alcohol use.    Review of Systems   Constitutional: Negative for fever.   Gastrointestinal: Positive for abdominal pain, diarrhea and nausea.   Genitourinary: Negative for dysuria and frequency.   Musculoskeletal: Positive for back pain.   Psychiatric/Behavioral: The patient is nervous/anxious.    All other systems reviewed and are negative.     Allergies:  Codeine  Cyclobenzaprine  Gabapentin  Hydrocodone  Hydroxyzine Pamoate  Sulfa Drugs    Medications:  Albuterol  Amlodipine  Atorvastatin  Benzoyl " peroxide  Symbicort  Bupropion  Cetirizine  Clonazepam  Emollient  Fluticasone  Furosemide  Hydroxyzine  Lamotrigine  Levofloxacin  Levothyroxine  Lisinopril  Metformin  Montelukast  Naltrexone  Nitroglycerin  Nystatin  Omeprazole  Oxycodone  Ondansetron  Ranitidine  Aripiprazole  Prednisone  Rybelsus  Semaglutide  Sucralfate  Cholecalciferol   Tizanidine  Compazine     Past Medical History:     Mediastinal cyst  Hyperlipidemia  Anxiety  GERD  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  Non-alcoholic steatohepatitis  Depression  Vocal cord polyp  Hypertension  Fibromyalgia  Suicide attempt  Recurrent miscarriages  COPD, moderate  Claudication of both lower extremities  PAD  Class 3 severe obesity  Anorectal disorder  Diaphragmatic hernia  Steatosis of liver     Past Surgical History:    Arthroscopy shoulder decompression, left  Biopsy artery temporal, left  Bronchial thermoplasty x3  Colonoscopy x2  Discectomy, fusion cervical anterior one level, combined  Polyps removed from vocal cors  EGD, combined x2  Excise mass trunk  Excise node mediastinal  Laparoscopic cholecystectomy  Thoracoscopy  Tonsillectomy  Appendectomy    Family History:    Ischemic heart disease  Hypertension  Cerebrovascular disease  Depression  Gallbladder disease  Asthma  Diabetes mellitus     Social History:  The patient presents to the ED alone.  The patient arrived via EMS.  PCP: Roro Chawla     Physical Exam     Patient Vitals for the past 24 hrs:   BP Temp Temp src Pulse Resp SpO2   12/23/22 2030 103/49 -- -- 75 -- 96 %   12/23/22 2015 100/64 -- -- 82 -- 95 %   12/23/22 1930 107/69 -- -- 97 -- 95 %   12/23/22 1922 121/76 -- -- -- -- 97 %   12/23/22 1900 121/80 -- -- -- -- 97 %   12/23/22 1844 -- -- -- 109 -- --   12/23/22 1838 (!) 141/106 98.4  F (36.9  C) Oral -- 30 98 %     Physical Exam  Vitals and nursing note reviewed.   Constitutional:       General: She is not in acute distress.     Appearance: She is obese. She is not  ill-appearing.   HENT:      Head: Normocephalic and atraumatic.      Right Ear: External ear normal.      Left Ear: External ear normal.      Nose: Nose normal.   Eyes:      Extraocular Movements: Extraocular movements intact.      Conjunctiva/sclera: Conjunctivae normal.   Cardiovascular:      Rate and Rhythm: Regular rhythm. Tachycardia present.      Heart sounds: No murmur heard.  Pulmonary:      Effort: Pulmonary effort is normal. No respiratory distress.      Breath sounds: Normal breath sounds. No wheezing, rhonchi or rales.   Abdominal:      General: Abdomen is flat. Bowel sounds are normal. There is no distension.      Palpations: Abdomen is soft.      Tenderness: There is no abdominal tenderness. There is no guarding or rebound.   Musculoskeletal:         General: No deformity or signs of injury.      Cervical back: Normal range of motion and neck supple.   Skin:     General: Skin is warm and dry.      Findings: No rash.   Neurological:      Mental Status: She is alert and oriented to person, place, and time.   Psychiatric:         Mood and Affect: Mood is anxious.         Behavior: Behavior is agitated.         Thought Content: Thought content is delusional.           Emergency Department Course     Laboratory:  Labs Ordered and Resulted from Time of ED Arrival to Time of ED Departure   BASIC METABOLIC PANEL - Abnormal       Result Value    Sodium 138      Potassium 4.2      Chloride 101      Carbon Dioxide (CO2) 24      Anion Gap 13      Urea Nitrogen 17.4      Creatinine 0.96 (*)     Calcium 9.2      Glucose 104 (*)     GFR Estimate 70     ROUTINE UA WITH MICROSCOPIC REFLEX TO CULTURE - Abnormal    Color Urine Light Yellow      Appearance Urine Clear      Glucose Urine Negative      Bilirubin Urine Negative      Ketones Urine Negative      Specific Gravity Urine 1.011      Blood Urine Negative      pH Urine 5.5      Protein Albumin Urine Negative      Urobilinogen Urine Normal      Nitrite Urine Negative       Leukocyte Esterase Urine Moderate (*)     Bacteria Urine Few (*)     RBC Urine 3 (*)     WBC Urine 24 (*)     Squamous Epithelials Urine 3 (*)    CBC WITH PLATELETS AND DIFFERENTIAL - Abnormal    WBC Count 9.0      RBC Count 4.32      Hemoglobin 11.7      Hematocrit 36.3      MCV 84      MCH 27.1      MCHC 32.2      RDW 17.2 (*)     Platelet Count 297      % Neutrophils 65      % Lymphocytes 27      % Monocytes 7      % Eosinophils 0      % Basophils 0      % Immature Granulocytes 1      NRBCs per 100 WBC 0      Absolute Neutrophils 5.8      Absolute Lymphocytes 2.4      Absolute Monocytes 0.6      Absolute Eosinophils 0.0      Absolute Basophils 0.0      Absolute Immature Granulocytes 0.1      Absolute NRBCs 0.0     HCG QUALITATIVE URINE - Normal    hCG Urine Qualitative Negative     DRUG ABUSE SCREEN 1 URINE (ED) - Normal    Amphetamines Urine Screen Negative      Barbituates Urine Screen Negative      Benzodiazepine Urine Screen Negative      Cannabinoids Urine Screen Negative      Cocaine Urine Screen Negative      Opiates Urine Screen Negative     URINE CULTURE      Emergency Department Course:      Reviewed:  I reviewed nursing notes, vitals, past medical history and Care Everywhere.    Assessments:   I obtained history and examined the patient as noted above.    I rechecked the patient and explained findings. I believe that they are safe for discharge at this time.     Interventions:   Toradol 15 mg IV   NS 1 L IV   Zyprexa 5 mg IM   Fentanyl 50 mcg IV    Disposition:  The patient was discharged to home.     Impression & Plan     Medical Decision Makin-year-old female presenting with low back and abdominal pain and an episode of diarrhea with what she thinks is bugs in it.  Patient brought a sample of her stool and a Tupperware container.  I examined the stool sample and did not see any evidence of bugs.  There are some black stringy pieces but they appear to be digested  food.  Patient's abdominal exam is very benign.  Her lab work is unremarkable.  She continued to complain of some low back pain and lower abdominal pain after she was given Toradol.  I offered to do a CT scan for further evaluation but patient declines.  She was given Zyprexa for nausea and for her anxiety which did seem to help somewhat.  I reassured the patient that there were no bugs in her stool.  I did reexamine her abdomen and she continues to have no significant tenderness noted.  Patient said that she will follow-up with her primary care doctor on Monday for reevaluation.  I recommended that she return to the ER if she has any worsening pain or other concerns.    Diagnosis:    ICD-10-CM    1. Nonspecific abdominal pain  R10.9       2. Acute midline low back pain without sciatica  M54.50       3. Anxiety  F41.9       4. Nonspecific abnormal finding in stool contents  R19.5         Scribe Disclosure:  I, Araceli Salazar, am serving as a scribe at 6:50 PM on 12/23/2022 to document services personally performed by Everton Rendon MD based on my observations and the provider's statements to me.       Everton Rendon MD  12/23/22 [id number]

## 2022-12-24 NOTE — ED TRIAGE NOTES
Pt arrives from home via EMS for abdominal and back pain, and diarrhea with living bugs. Abdominal pain started at 5pm today, diarrhea x1 day  After taking miralax due to constipation. Pt extremely anxious, given 25mg benadryl en route for anxiousness. A&Ox4. Denies drug and alcohol use.

## 2022-12-25 LAB — BACTERIA UR CULT: NORMAL

## 2022-12-25 NOTE — RESULT ENCOUNTER NOTE
Final urine culture report is negative.  Adult Negative Urine culture parameters per protocol: Any # Urogenital single or mixed organism, <10,000 col/ml single organism (cath/midstream), and > 3 organisms (No susceptibilities performed).  [facility] Emergency Dept discharge antibiotic prescribed (If applicable): None  Treatment recommendations per [facility] ED Lab Result Urine Culture protocol.

## 2022-12-26 ENCOUNTER — TELEPHONE (OUTPATIENT)
Dept: INTERNAL MEDICINE | Facility: CLINIC | Age: 52
End: 2022-12-26

## 2022-12-26 NOTE — TELEPHONE ENCOUNTER
Patient requesting to see only Dr Low for a ED follow up. Patient will call [facility] to be seen as well. Patient had stomach pain and loose stools. Patient stated there were 2 inch long grey looking bugs with 4 legs. Patient stated there were 4 of them. Patient stated the ED provider thought it was part of her intestines. Please advise  Ok to call and lm [phone]

## 2022-12-28 DIAGNOSIS — R73.03 PREDIABETES: ICD-10-CM

## 2022-12-28 DIAGNOSIS — R06.02 SOB (SHORTNESS OF BREATH): ICD-10-CM

## 2022-12-28 DIAGNOSIS — R07.2 PRECORDIAL PAIN: ICD-10-CM

## 2022-12-28 RX ORDER — AMLODIPINE BESYLATE 2.5 MG/1
5 TABLET ORAL DAILY
Qty: 180 TABLET | Refills: 0 | Status: SHIPPED | OUTPATIENT
Start: 2022-12-28 | End: 2023-05-18

## 2022-12-29 NOTE — TELEPHONE ENCOUNTER
Prescription approved per [facility] Refill Protocol.  Solomon HALL RN, BSN

## 2023-01-02 NOTE — TELEPHONE ENCOUNTER
Left message on voicemail at number provided asking patient to return call to clinic.  SERJIO Chandra R.N.

## 2023-01-05 DIAGNOSIS — L30.9 ECZEMA, UNSPECIFIED TYPE: ICD-10-CM

## 2023-01-06 ENCOUNTER — TELEPHONE (OUTPATIENT)
Dept: INTERNAL MEDICINE | Facility: CLINIC | Age: 53
End: 2023-01-06

## 2023-01-06 RX ORDER — HYDROCORTISONE 2.5 %
CREAM (GRAM) TOPICAL
Qty: 28.35 G | Refills: 1 | Status: SHIPPED | OUTPATIENT
Start: 2023-01-06 | End: 2023-04-23

## 2023-01-06 NOTE — TELEPHONE ENCOUNTER
Patient calls and states that she woke up yesterday with a URI.  Cold, Cough, Nasal discharge (yellow), and tight chest.  Patient states she did a nebulizer this morning and that helped the tightness in her chest.    Patient is requesting Levaquin and Prednisone because these two medications worked well for her when she was ill last time with the same symptoms.  Please address and advise.

## 2023-01-08 NOTE — TELEPHONE ENCOUNTER
Most of the URI are viral and no antibiotic is needed.  If she is not feeling better, she can do E-visit

## 2023-01-09 ENCOUNTER — TRANSFERRED RECORDS (OUTPATIENT)
Dept: HEALTH INFORMATION MANAGEMENT | Facility: CLINIC | Age: 53
End: 2023-01-09
Payer: COMMERCIAL

## 2023-01-09 DIAGNOSIS — K59.00 CONSTIPATION, UNSPECIFIED CONSTIPATION TYPE: ICD-10-CM

## 2023-01-09 RX ORDER — POLYETHYLENE GLYCOL 3350 17 G/17G
POWDER, FOR SOLUTION ORAL
Qty: 510 G | Refills: 1 | Status: SHIPPED | OUTPATIENT
Start: 2023-01-09 | End: 2023-01-27

## 2023-01-10 ENCOUNTER — TELEPHONE (OUTPATIENT)
Dept: ENDOCRINOLOGY | Facility: CLINIC | Age: 53
End: 2023-01-10

## 2023-01-10 NOTE — TELEPHONE ENCOUNTER
Provider informed patient of possible no coverage message that has been popping up when going to sign visit notes. Patient wants to call her insurance to check in further.     Cancelled today's appointment.     Will reschedule pending coverage.    SHELLY Henning, RD, LD  Clinic #: [phone]

## 2023-01-13 ENCOUNTER — OFFICE VISIT (OUTPATIENT)
Dept: INTERNAL MEDICINE | Facility: CLINIC | Age: 53
End: 2023-01-13
Payer: COMMERCIAL

## 2023-01-13 VITALS
RESPIRATION RATE: 18 BRPM | HEIGHT: 63 IN | BODY MASS INDEX: 37.95 KG/M2 | SYSTOLIC BLOOD PRESSURE: 113 MMHG | WEIGHT: 214.2 LBS | HEART RATE: 87 BPM | OXYGEN SATURATION: 98 % | DIASTOLIC BLOOD PRESSURE: 73 MMHG | TEMPERATURE: 98.5 F

## 2023-01-13 DIAGNOSIS — Z01.818 PREOPERATIVE EXAMINATION: Primary | ICD-10-CM

## 2023-01-13 DIAGNOSIS — K59.00 CONSTIPATION, UNSPECIFIED CONSTIPATION TYPE: ICD-10-CM

## 2023-01-13 PROCEDURE — 93000 ELECTROCARDIOGRAM COMPLETE: CPT | Performed by: INTERNAL MEDICINE

## 2023-01-13 PROCEDURE — 99214 OFFICE O/P EST MOD 30 MIN: CPT | Mod: 25 | Performed by: INTERNAL MEDICINE

## 2023-01-13 ASSESSMENT — ENCOUNTER SYMPTOMS
NEUROLOGICAL NEGATIVE: 1
CARDIOVASCULAR NEGATIVE: 1
CONSTITUTIONAL NEGATIVE: 1
MUSCULOSKELETAL NEGATIVE: 1
RESPIRATORY NEGATIVE: 1
CONSTIPATION: 1

## 2023-01-13 ASSESSMENT — PATIENT HEALTH QUESTIONNAIRE - PHQ9
SUM OF ALL RESPONSES TO PHQ QUESTIONS 1-9: 13
10. IF YOU CHECKED OFF ANY PROBLEMS, HOW DIFFICULT HAVE THESE PROBLEMS MADE IT FOR YOU TO DO YOUR WORK, TAKE CARE OF THINGS AT HOME, OR GET ALONG WITH OTHER PEOPLE: SOMEWHAT DIFFICULT
SUM OF ALL RESPONSES TO PHQ QUESTIONS 1-9: 13

## 2023-01-13 NOTE — PROGRESS NOTES
[place] [address] [address] [facility] [facility]  [address]  [facility] [address]  Phone: [phone]  Primary Provider: Roro Chawla  Pre-op Performing Provider: EVELIO ALLEN      PREOPERATIVE EVALUATION:  Today's date: 1/13/2023    Swetha Patterson is a 52 year old female who presents for a preoperative evaluation.    Surgical Information:  Surgery/Procedure: Colonoscopy  Surgery Location: [facility]  Surgeon: Dr. Carson Domínguez  Surgery Date: 1/20/23  Time of Surgery: 9:45 AM  Where patient plans to recover: At home with family  Fax number for surgical facility: Note does not need to be faxed, will be available electronically in Epic.    Type of Anesthesia Anticipated: MAC    Assessment & Plan     The proposed surgical procedure is considered LOW risk.    Preoperative examination and Constipation, unspecified constipation type  At this time, patient does have a relatively unremarkable physical examination.  Her blood pressure is noted to be in acceptable level.  She has previously tolerated anesthesia without issue.  Patient ultimately will tolerate greater than 4 METS physical activity.  Her most recent A1c looked excellent at 5.5.  EKG is unremarkable.  Patient did have lab work collected on December 23 with no concerning findings noted on her CBC or metabolic panel.  At this time, I would consider her to be in acceptable candidate to proceed with a low risk, noncardiac related procedure.  Patient should be able to proceed with her colonoscopy as currently scheduled.  She should follow any additional instructions as provided to her by her performing physician.             Risks and Recommendations:  The patient has the following additional risks and recommendations for perioperative complications:   - No identified additional risk factors other than previously addressed    Medication Instructions:  Patient is to take all scheduled medications on  the day of surgery    RECOMMENDATION:  APPROVAL GIVEN to proceed with proposed procedure, without further diagnostic evaluation.    Ordering of each unique test  30 minutes spent on the date of the encounter doing chart review, history and exam, documentation and further activities per the note      Subjective     HPI related to upcoming procedure: Patient is a 52-year-old  female with complicated past medical history who presents to the clinic for preoperative examination.  She is currently scheduled undergo a colonoscopy for further evaluation of persistent constipation on January 20, 2023.  Patient has undergone cervical procedures previously including prior colonoscopies and EGDs.  She has never had any issues tolerating anesthesia.  Patient is unaware of any family history of anesthesia intolerance or bleeding symptoms.  Patient does have a complex past medical history that includes diabetes, her most recent hemoglobin A1c was noted to be 5.5.  Patient is able to walk up a flight of stairs at experiencing chest pain, shortness of breath, or syncopal episodes.  He has no history of cardiac murmur.  Patient is a non-smoker.    Preop Questions 1/13/2023   1. Have you ever had a heart attack or stroke? No   2. Have you ever had surgery on your heart or blood vessels, such as a stent placement, a coronary artery bypass, or surgery on an artery in your head, neck, heart, or legs? No   3. Do you have chest pain with activity? No   4. Do you have a history of  heart failure? No   5. Do you currently have a cold, bronchitis or symptoms of other infection? No   6. Do you have a cough, shortness of breath, or wheezing? No   7. Do you or anyone in your family have previous history of blood clots? No   8. Do you or does anyone in your family have a serious bleeding problem such as prolonged bleeding following surgeries or cuts? No   9. Have you ever had problems with anemia or been told to take iron pills? No   10.  Have you had any abnormal blood loss such as black, tarry or bloody stools, or abnormal vaginal bleeding? No   11. Have you ever had a blood transfusion? No   12. Are you willing to have a blood transfusion if it is medically needed before, during, or after your surgery? Yes   13. Have you or any of your relatives ever had problems with anesthesia? No   14. Do you have sleep apnea, excessive snoring or daytime drowsiness? No   15. Do you have any artifical heart valves or other implanted medical devices like a pacemaker, defibrillator, or continuous glucose monitor? No   16. Do you have artificial joints? No   17. Are you allergic to latex? No   18. Is there any chance that you may be pregnant? No       Health Care Directive:  Patient does not have a Health Care Directive or Living Will: Discussed advance care planning with patient; however, patient declined at this time.    Preoperative Review of :   reviewed - no record of controlled substances prescribed.          Review of Systems   Constitutional: Negative.    HENT: Negative.    Respiratory: Negative.    Cardiovascular: Negative.    Gastrointestinal: Positive for constipation.   Genitourinary: Negative.    Musculoskeletal: Negative.    Neurological: Negative.          Patient Active Problem List    Diagnosis Date Noted     Change in bowel movement 11/22/2021     Priority: Medium     Nausea 11/22/2021     Priority: Medium     Rectal pain 11/22/2021     Priority: Medium     Class 3 severe obesity due to excess calories with serious comorbidity and body mass index (BMI) of 40.0 to 44.9 in adult (H) 08/31/2020     Priority: Medium     Claudication of both lower extremities (H) 03/06/2020     Priority: Medium     PAD (peripheral artery disease) (H) 03/06/2020     Priority: Medium     Jaw claudication 03/06/2020     Priority: Medium     Added automatically from request for surgery [id number]       Anorectal disorder 08/23/2019     Priority: Medium     HTN, goal  "below 140/90 01/12/2019     Priority: Medium     Digestive symptom 09/18/2018     Priority: Medium     Vocal cord polyp 06/28/2018     Priority: Medium     Status post cervical spinal fusion 05/01/2018     Priority: Medium     Steatosis of liver 10/09/2017     Priority: Medium     Epigastric pain 09/26/2017     Priority: Medium     Diaphragmatic hernia 09/22/2017     Priority: Medium     DIAZ (nonalcoholic steatohepatitis) 06/17/2016     Priority: Medium     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.  Doesn't drink, but relapsed (doesn't remember).  Was on Valium for 4 years (until October 2017). Xanax since 11/2017.  Managed by hospital.        Past Medical History:   Diagnosis Date     Anxiety state, unspecified      Arthritis      Asthma      Chronic pain     back pain from cyst     Contact dermatitis and other eczema, due to unspecified cause      COPD (chronic obstructive pulmonary disease) (H)      Depressive disorder      Depressive disorder, not elsewhere classified      Diabetes (H)      Emphysema with chronic bronchitis (H)      Esophageal reflux      Family history of ischemic heart disease      Fibromyalgia      Gastro-oesophageal reflux disease      Heart disease     has chest pain sometimes     History of emphysema      Hoarseness      HTN, goal below 140/90      Hyperlipidemia LDL goal <130      Liver disease     \"fatty liver\"     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)      PONV " (postoperative nausea and vomiting)      Rectal bleeding      Thyroid disease      Past Surgical History:   Procedure Laterality Date     ABDOMEN SURGERY       ANESTHESIA OUT OF OR MRI N/A 10/07/2021    Procedure: ANESTHESIA OUT OF OR MRI;  Surgeon: GENERIC ANESTHESIA PROVIDER;  Location: [facility] OR     ARTHROSCOPY SHOULDER DECOMPRESSION Left 10/21/2015    Procedure: ARTHROSCOPY SHOULDER DECOMPRESSION;  Surgeon: Julien Milian MD;  Location: [facility] OR     BIOPSY ARTERY TEMPORAL Left 03/11/2020    Procedure: LEFT TEMPORAL ARTERY BIOPSY;  Surgeon: Ibeth Conner MD;  Location: [facility]     BRONCHIAL THERMOPLASTY N/A 11/14/2014    Procedure: BRONCHIAL THERMOPLASTY;  Surgeon: Ward Whitaker MD;  Location: [facility]     BRONCHIAL THERMOPLASTY N/A 12/19/2014    Procedure: BRONCHIAL THERMOPLASTY;  Surgeon: Ward Whitaker MD;  [facility]     BRONCHIAL THERMOPLASTY N/A 02/06/2015    Procedure: BRONCHIAL THERMOPLASTY;  Surgeon: Ward Whitaker MD;  [facility]: [facility]     COLONOSCOPY N/A 11/26/2018    Procedure: COLONOSCOPY (Former[facility]);  Surgeon: Marshall Oakes MD;  Location: [facility] OR     COLONOSCOPY N/A 10/22/2020    Procedure: Colonoscopy;  Surgeon: Marshlal Mccormick MD;  Location: [facility] OR     DISCECTOMY, FUSION CERVICAL ANTERIOR ONE LEVEL, COMBINED N/A 05/01/2018    Procedure: COMBINED DISCECTOMY, FUSION CERVICAL ANTERIOR ONE LEVEL;  1.  C5-C6 anterior cervical diskectomy and fusion.    2.  C5-C6 application of intervertebral biomechanical device for interbody fusion purposes.    3.  C5-C6 anterior instrumentation using the standard Kristin InViZia 24 mm plate with four associated bone screws, 12 mm in length.  Inferior screws are fixed.  Superior screws are va     ENT SURGERY  2015    polyps removed from vocal cords      ESOPHAGOSCOPY, GASTROSCOPY, DUODENOSCOPY (EGD), COMBINED N/A 10/22/2020    Procedure: Esophagoscopy, gastroscopy, duodenoscopy with biopsies;  Surgeon: Marshall Mccormick MD;  Location:  OR      ESOPHAGOSCOPY, GASTROSCOPY, DUODENOSCOPY (EGD), COMBINED N/A 06/17/2021    Procedure: ESOPHAGOGASTRODUODENOSCOPY, WITH BIOPSY;  Surgeon: Darrel Damon MD;  Location:  GI     EXCISE MASS TRUNK Left 11/03/2021    Procedure: EXCISION LEFT SHOULDER LIPOMA;  Surgeon: Ibeth Conner MD;  Location:  OR     EXCISE NODE MEDIASTINAL  04/26/2013    Procedure: EXCISE NODE MEDIASTINAL;;  Surgeon: Av Peña MD;  Location:  OR     HEAD & NECK SURGERY  2018    Had my five and six fused in my neck     LAPAROSCOPIC CHOLECYSTECTOMY N/A 10/19/2017    Procedure: LAPAROSCOPIC CHOLECYSTECTOMY;  LAPAROSCOPIC CHOLECYSTECTOMY;  Surgeon: Ney Jerry MD;  Location:  OR     THORACOSCOPY  04/26/2013    Procedure: THORACOSCOPY;  LEFT VIDEO ASSISTED THORACOSCOPY, RESECTION OF POSTERIOR MEDIASTINAL MASS;  Surgeon: Av Peña MD;  Location:  OR     TONSILLECTOMY  as a kid     TONSILLECTOMY       ZZC APPENDECTOMY  at age 17     Current Outpatient Medications   Medication Sig Dispense Refill     albuterol (PROVENTIL) (2.5 MG/3ML) 0.083% neb solution INHALE ONE VIAL BY NEBULIZER EVERY 6 HOURS AS NEEDED FOR SHORTNESS OF BREATH OR WHEEZING 75 mL 3     albuterol (VENTOLIN HFA) 108 (90 Base) MCG/ACT inhaler Inhale 2 puffs into the lungs every 6 hours 18 g 0     amLODIPine (NORVASC) 2.5 MG tablet Take 2 tablets (5 mg) by mouth daily 180 tablet 0     atorvastatin (LIPITOR) 80 MG tablet TAKE ONE TABLET BY MOUTH EVERY DAY 90 tablet 3     benzoyl peroxide (ACNE-CLEAR) 10 % external gel Apply topically 2 times daily as needed 90 g 1     blood glucose (ACCU-CHEK OLINDA PLUS) test strip USE TO TEST BLOOD SUGAR ONCE DAILY OR AS DIRECTED 100 strip 3     blood glucose (NO BRAND SPECIFIED) lancets standard Use to test blood sugar 1 times daily or as directed. 100 each 3     blood glucose (NO BRAND SPECIFIED) test strip Use to test blood sugar 1 times daily or as directed.  Dispense Accu-chek Olinda Plus or per  patient insurance 100 strip 3     blood glucose monitoring (NO BRAND SPECIFIED) meter device kit Use to test blood sugar 1 times daily or as directed.  Dispense Accu-chek Olinda Plus or per insurance preference 1 kit 0     blood glucose monitoring (NO BRAND SPECIFIED) meter device kit Use to test blood sugar 1 times daily or as directed. 1 kit 0     budesonide-formoterol (SYMBICORT) 160-4.5 MCG/ACT Inhaler Inhale 2 puffs into the lungs 2 times daily 30.6 g 3     buPROPion (WELLBUTRIN XL) 150 MG 24 hr tablet TAKE ONE TABLET BY MOUTH EVERY MORNING 90 tablet 1     cetirizine (ZYRTEC) 10 MG tablet TAKE ONE TABLET BY MOUTH EVERY DAY AS NEEDED 90 tablet 3     clindamycin (CLINDAMAX) 1 % external gel Apply topically 2 times daily 60 g 3     clonazePAM (KLONOPIN) 0.5 MG tablet Take 0.5 mg by mouth daily as needed        clonazePAM (KLONOPIN) 1 MG tablet Take 1 tablet (1 mg) by mouth 2 times daily as needed for anxiety She needs to see her PCP to get more tablets 5 tablet 0     Emollient (CERAVE MOISTURIZING) CREA Externally apply 1 Application topically 2 times daily 453 g 11     fluticasone (FLONASE) 50 MCG/ACT nasal spray Spray 2 sprays in nostril daily (Patient taking differently: Spray 2 sprays in nostril daily as needed) 16 g 5     furosemide (LASIX) 20 MG tablet TAKE ONE TABLET BY MOUTH TWICE A  tablet 3     hydrocortisone 2.5 % cream APPLY TO AFFECTED AREA(S) TWO TIMES A DAY 28.35 g 1     hydrOXYzine (ATARAX) 50 MG tablet TAKE TWO TABLETS BY MOUTH THREE TIMES A DAY AS NEEDED FOR ANXIETY 70 tablet 9     lamoTRIgine (LAMICTAL) 150 MG tablet Take 150 mg by mouth daily       lamoTRIgine (LAMICTAL) 200 MG tablet Take 200 mg by mouth daily       levofloxacin (LEVAQUIN) 500 MG tablet Take 1 tablet (500 mg) by mouth daily 7 tablet 0     levothyroxine (SYNTHROID/LEVOTHROID) 50 MCG tablet Take 1 tablet (50 mcg) by mouth daily 90 tablet 1     lisinopril (ZESTRIL) 20 MG tablet TAKE ONE TABLET BY MOUTH EVERY DAY 90 tablet 0      metFORMIN (GLUCOPHAGE) 500 MG tablet TAKE ONE TABLET BY MOUTH EVERY DAY WITH BREAKFAST 90 tablet 0     minoxidil (ROGAINE) 5 % external solution apply per package directions to the scalp once daily 120 mL 3     montelukast (SINGULAIR) 10 MG tablet TAKE ONE TABLET BY MOUTH AT BEDTIME 90 tablet 1     multivitamin, therapeutic (THERA-VIT) TABS tablet Take 1 tablet by mouth daily       naltrexone (DEPADE/REVIA) 50 MG tablet Take 1/2 tablet once daily 1-2 hours prior to worst cravings for 1 week, then increase to 1 tablet daily as directed if tolerating 30 tablet 3     nitroGLYcerin (NITROSTAT) 0.4 MG sublingual tablet PLACE ONE TABLET UNDER THE TONGUE AT THE 1ST SIGN OF ATTACK. IF PAIN IS UNRELIEVED OR WORSENED 5 MINUTES AFTER 1ST DOSE, PROMPT MEDICAL ASSISTANCE IS NEEDED. MAY REPEAT EVERY 5 MINUTES UNTIL PAIN IS RELIEVED. MAX OF THREE DOSES 25 tablet 11     nystatin (MYCOSTATIN) 285348 UNIT/GM external powder APPLY TOPICALLY TWICE A DAY AS NEEDED SKIN RASH 45 g 9     nystatin (MYCOSTATIN) 246408 UNIT/ML suspension Take 5 mLs (500,000 Units) by mouth 4 times daily 60 mL 1     omeprazole (PRILOSEC) 20 MG DR capsule TAKE ONE CAPSULE BY MOUTH TWICE A  capsule 1     omeprazole (PRILOSEC) 40 MG DR capsule Take 40 mg twice a day for a month then take it daily 60 capsule 3     ondansetron (ZOFRAN) 8 MG tablet TAKE ONE-HALF TO ONE TABLET BY MOUTH EVERY 8 HOURS AS NEEDED FOR NAUSEA 45 tablet 3     ondansetron (ZOFRAN-ODT) 4 MG ODT tab Take 1 tablet (4 mg) by mouth every 6 hours as needed for nausea 12 tablet 1     permethrin (LICE TREATMENT CREME RINSE) 1 % external liquid Apply topically from the neck down, leave on overnight (approx. 8 hours) and repeat 1 weeks later. 120 mL 1     polyethylene glycol (MIRALAX) 17 GM/Dose powder DISSOLVE 17 GRAMS (1 CAPFUL) AND DRINK BY MOUTH ONCE DAILY 510 g 1     predniSONE (DELTASONE) 20 MG tablet Take 2 tablets (40 mg) by mouth daily 10 tablet 0     RYBELSUS 14 MG TABS TAKE ONE  "TABLET BY MOUTH EVERY DAY 30 tablet 3     Semaglutide 7 MG TABS Take 7 mg by mouth daily 30 tablet 3     sucralfate (CARAFATE) 1 GM tablet Take 1 tablet (1 g) by mouth 4 times daily as needed for nausea 120 tablet 1     sulfamethoxazole-trimethoprim (BACTRIM DS) 800-160 MG tablet Take 1 tablet by mouth 2 times daily Take one tablet twice daily. For additional refills, please schedule a follow-up appointment. 180 tablet 1     terbinafine (LAMISIL) 1 % external cream APPLY TO AFFECTED AREA(S) TWO TIMES A DAY 30 g 2     triamcinolone (KENALOG) 0.025 % external ointment Apply topically 2 times daily 80 g 1     Vitamin D3 (CHOLECALCIFEROL) 125 MCG (5000 UT) tablet Take 1 tablet by mouth three times a week         Allergies   Allergen Reactions     Codeine Nausea and Vomiting     vomiting     Cyclobenzaprine Nausea     Gabapentin Rash     Hydrocodone Nausea and Vomiting     Sulfa Drugs Nausea and Vomiting     Nausea        Social History     Tobacco Use     Smoking status: Former     Types: Other     Smokeless tobacco: Former     Quit date: 2/13/2022     Tobacco comments:     Quit smoking prior to bariatric surgery and because of my COPD   Substance Use Topics     Alcohol use: Not Currently       History   Drug Use No         Objective     Blood pressure 113/73, pulse 87, temperature 98.5  F (36.9  C), temperature source Tympanic, resp. rate 18, height 1.6 m (5' 3\"), weight 97.2 kg (214 lb 3.2 oz), last menstrual period 04/15/2016, SpO2 98 %, not currently breastfeeding.        Physical Exam  Vitals reviewed.   Constitutional:       Appearance: Normal appearance.   HENT:      Head: Normocephalic and atraumatic.      Mouth/Throat:      Mouth: Mucous membranes are moist.      Pharynx: Oropharynx is clear.   Eyes:      Extraocular Movements: Extraocular movements intact.      Conjunctiva/sclera: Conjunctivae normal.      Pupils: Pupils are equal, round, and reactive to light.   Cardiovascular:      Rate and Rhythm: Normal " rate and regular rhythm.      Pulses: Normal pulses.      Heart sounds: Normal heart sounds.   Pulmonary:      Effort: Pulmonary effort is normal.      Breath sounds: Normal breath sounds.   Musculoskeletal:      Cervical back: Normal range of motion and neck supple.   Skin:     General: Skin is warm and dry.      Capillary Refill: Capillary refill takes less than 2 seconds.   Neurological:      General: No focal deficit present.      Mental Status: She is alert and oriented to person, place, and time.           Recent Labs   Lab Test 12/23/22  1937 08/17/22  0929 01/24/22  1403   HGB 11.7 13.1 13.9    339 379    139 135   POTASSIUM 4.2 4.0 4.0   CR 0.96* 0.80 0.85   A1C  --  5.5 5.8*        Diagnostics:  No labs were ordered during this visit.   EKG: appears normal, NSR, normal axis, normal intervals, no acute ST/T changes c/w ischemia, no LVH by voltage criteria, unchanged from previous tracings    Revised Cardiac Risk Index (RCRI):  The patient has the following serious cardiovascular risks for perioperative complications:   - No serious cardiac risks = 0 points     RCRI Interpretation: 0 points: Class I (very low risk - 0.4% complication rate)           Signed Electronically by: Denis Pan MD  Copy of this evaluation report is provided to requesting physician.

## 2023-01-13 NOTE — PATIENT INSTRUCTIONS
For informational purposes only. Not to replace the advice of your health care provider. Copyright   2003,  [facility]. All rights reserved. Clinically reviewed by Cheyenne Kimble MD. SumAll [id number] - REV .  Preparing for Your Surgery  Getting started  A nurse will call you to review your health history and instructions. They will give you an arrival time based on your scheduled surgery time. Please be ready to share:    Your doctor's clinic name and phone number    Your medical, surgical, and anesthesia history    A list of allergies and sensitivities    A list of medicines, including herbal treatments and over-the-counter drugs    Whether the patient has a legal guardian (ask how to send us the papers in advance)  Please tell us if you're pregnant--or if there's any chance you might be pregnant. Some surgeries may injure a fetus (unborn baby), so they require a pregnancy test. Surgeries that are safe for a fetus don't always need a test, and you can choose whether to have one.   If you have a child who's having surgery, please ask for a copy of Preparing for Your Child's Surgery.    Preparing for surgery    Within 10 to 30 days of surgery: Have a pre-op exam (sometimes called an H&P, or History and Physical). This can be done at a clinic or pre-operative center.  ? If you're having a , you may not need this exam. Talk to your care team.    At your pre-op exam, talk to your care team about all medicines you take. If you need to stop any medicines before surgery, ask when to start taking them again.  ? We do this for your safety. Many medicines can make you bleed too much during surgery. Some change how well surgery (anesthesia) drugs work.    Call your insurance company to let them know you're having surgery. (If you don't have insurance, call [phone].)    Call your clinic if there's any change in your health. This includes signs of a cold or flu (sore throat, runny nose,  cough, rash, fever). It also includes a scrape or scratch near the surgery site.    If you have questions on the day of surgery, call your hospital or surgery center.  Eating and drinking guidelines  For your safety: Unless your surgeon tells you otherwise, follow the guidelines below.    Eat and drink as usual until 8 hours before you arrive for surgery. After that, no food or milk.    Drink clear liquids until 2 hours before you arrive. These are liquids you can see through, like water, Gatorade, and Propel Water. They also include plain black coffee and tea (no cream or milk), candy, and breath mints. You can spit out gum when you arrive.    If you drink alcohol: Stop drinking it the night before surgery.    If your care team tells you to take medicine on the morning of surgery, it's okay to take it with a sip of water.  Preventing infection    Shower or bathe the night before and morning of your surgery. Follow the instructions your clinic gave you. (If no instructions, use regular soap.)    Don't shave or clip hair near your surgery site. We'll remove the hair if needed.    Don't smoke or vape the morning of surgery. You may chew nicotine gum up to 2 hours before surgery. A nicotine patch is okay.  ? Note: Some surgeries require you to completely quit smoking and nicotine. Check with your surgeon.    Your care team will make every effort to keep you safe from infection. We will:  ? Clean our hands often with soap and water (or an alcohol-based hand rub).  ? Clean the skin at your surgery site with a special soap that kills germs.  ? Give you a special gown to keep you warm. (Cold raises the risk of infection.)  ? Wear special hair covers, masks, gowns and gloves during surgery.  ? Give antibiotic medicine, if prescribed. Not all surgeries need antibiotics.  What to bring on the day of surgery    Photo ID and insurance card    Copy of your health care directive, if you have one    Glasses and hearing aids (bring  cases)  ? You can't wear contacts during surgery    Inhaler and eye drops, if you use them (tell us about these when you arrive)    CPAP machine or breathing device, if you use them    A few personal items, if spending the night    If you have . . .  ? A pacemaker, ICD (cardiac defibrillator) or other implant: Bring the ID card.  ? An implanted stimulator: Bring the remote control.  ? A legal guardian: Bring a copy of the certified (court-stamped) guardianship papers.  Please remove any jewelry, including body piercings. Leave jewelry and other valuables at home.  If you're going home the day of surgery    You must have a responsible adult drive you home. They should stay with you overnight as well.    If you don't have someone to stay with you, and you aren't safe to go home alone, we may keep you overnight. Insurance often won't pay for this.  After surgery  If it's hard to control your pain or you need more pain medicine, please call your surgeon's office.  Questions?   If you have any questions for your care team, list them here: _________________________________________________________________________________________________________________________________________________________________________ ____________________________________ ____________________________________ ____________________________________

## 2023-01-17 DIAGNOSIS — L73.2 HIDRADENITIS SUPPURATIVA: ICD-10-CM

## 2023-01-17 RX ORDER — SULFAMETHOXAZOLE/TRIMETHOPRIM 800-160 MG
1 TABLET ORAL 2 TIMES DAILY
OUTPATIENT
Start: 2023-01-17

## 2023-01-17 NOTE — TELEPHONE ENCOUNTER
sulfamethoxazole-trimethoprim (BACTRIM DS) 800-160 MG tablet  Last Written Prescription Date:   4/7/2022  Last Fill Quantity: 180,   # refills: 1  Last Office Visit :  4/7/2022  Future Office visit:  None    Routing refill request to provider for review/approval because:  Second request  Over due office visit  Med refused  Clinic notified.     Madonna Pang RN  Central Triage Red Flags/Med Refills

## 2023-01-19 ENCOUNTER — ANESTHESIA EVENT (OUTPATIENT)
Dept: GASTROENTEROLOGY | Facility: CLINIC | Age: 53
End: 2023-01-19
Payer: COMMERCIAL

## 2023-01-19 ENCOUNTER — TELEPHONE (OUTPATIENT)
Dept: INTERNAL MEDICINE | Facility: CLINIC | Age: 53
End: 2023-01-19

## 2023-01-20 ENCOUNTER — ANESTHESIA (OUTPATIENT)
Dept: GASTROENTEROLOGY | Facility: CLINIC | Age: 53
End: 2023-01-20
Payer: COMMERCIAL

## 2023-01-20 ENCOUNTER — HOSPITAL ENCOUNTER (OUTPATIENT)
Facility: CLINIC | Age: 53
Discharge: HOME | End: 2023-01-20
Attending: INTERNAL MEDICINE | Admitting: INTERNAL MEDICINE
Payer: COMMERCIAL

## 2023-01-20 VITALS
HEIGHT: 63 IN | OXYGEN SATURATION: 96 % | RESPIRATION RATE: 9 BRPM | BODY MASS INDEX: 35.44 KG/M2 | SYSTOLIC BLOOD PRESSURE: 111 MMHG | DIASTOLIC BLOOD PRESSURE: 67 MMHG | HEART RATE: 65 BPM | WEIGHT: 200 LBS

## 2023-01-20 DIAGNOSIS — R11.0 NAUSEA: Primary | ICD-10-CM

## 2023-01-20 LAB — COLONOSCOPY: NORMAL

## 2023-01-20 PROCEDURE — 258N000003 HC RX IP 258 OP 636

## 2023-01-20 PROCEDURE — 250N000011 HC RX IP 250 OP 636

## 2023-01-20 PROCEDURE — 88305 TISSUE EXAM BY PATHOLOGIST: CPT | Mod: TC | Performed by: INTERNAL MEDICINE

## 2023-01-20 PROCEDURE — 88305 TISSUE EXAM BY PATHOLOGIST: CPT | Mod: 26 | Performed by: PATHOLOGY

## 2023-01-20 PROCEDURE — 258N000003 HC RX IP 258 OP 636: Performed by: ANESTHESIOLOGY

## 2023-01-20 PROCEDURE — 250N000009 HC RX 250: Performed by: ANESTHESIOLOGY

## 2023-01-20 PROCEDURE — 370N000017 HC ANESTHESIA TECHNICAL FEE, PER MIN: Performed by: INTERNAL MEDICINE

## 2023-01-20 PROCEDURE — 250N000009 HC RX 250

## 2023-01-20 PROCEDURE — 999N000010 HC STATISTIC ANES STAT CODE-CRNA PER MINUTE: Performed by: INTERNAL MEDICINE

## 2023-01-20 PROCEDURE — 45385 COLONOSCOPY W/LESION REMOVAL: CPT | Performed by: INTERNAL MEDICINE

## 2023-01-20 RX ORDER — NALOXONE HYDROCHLORIDE 0.4 MG/ML
0.2 INJECTION, SOLUTION INTRAMUSCULAR; INTRAVENOUS; SUBCUTANEOUS
Status: DISCONTINUED | OUTPATIENT
Start: 2023-01-20 | End: 2023-01-20 | Stop reason: HOSPADM

## 2023-01-20 RX ORDER — LIDOCAINE HYDROCHLORIDE 20 MG/ML
INJECTION, SOLUTION INFILTRATION; PERINEURAL PRN
Status: DISCONTINUED | OUTPATIENT
Start: 2023-01-20 | End: 2023-01-20

## 2023-01-20 RX ORDER — LIDOCAINE 40 MG/G
CREAM TOPICAL
Status: DISCONTINUED | OUTPATIENT
Start: 2023-01-20 | End: 2023-01-20 | Stop reason: HOSPADM

## 2023-01-20 RX ORDER — SCOLOPAMINE TRANSDERMAL SYSTEM 1 MG/1
1 PATCH, EXTENDED RELEASE TRANSDERMAL ONCE
Status: DISCONTINUED | OUTPATIENT
Start: 2023-01-20 | End: 2023-01-20 | Stop reason: HOSPADM

## 2023-01-20 RX ORDER — NALOXONE HYDROCHLORIDE 0.4 MG/ML
0.4 INJECTION, SOLUTION INTRAMUSCULAR; INTRAVENOUS; SUBCUTANEOUS
Status: DISCONTINUED | OUTPATIENT
Start: 2023-01-20 | End: 2023-01-20 | Stop reason: HOSPADM

## 2023-01-20 RX ORDER — PROCHLORPERAZINE MALEATE 10 MG
10 TABLET ORAL EVERY 6 HOURS PRN
Status: DISCONTINUED | OUTPATIENT
Start: 2023-01-20 | End: 2023-01-20 | Stop reason: HOSPADM

## 2023-01-20 RX ORDER — PROPOFOL 10 MG/ML
INJECTION, EMULSION INTRAVENOUS CONTINUOUS PRN
Status: DISCONTINUED | OUTPATIENT
Start: 2023-01-20 | End: 2023-01-20

## 2023-01-20 RX ORDER — ONDANSETRON 4 MG/1
4 TABLET, ORALLY DISINTEGRATING ORAL EVERY 6 HOURS PRN
Status: DISCONTINUED | OUTPATIENT
Start: 2023-01-20 | End: 2023-01-20 | Stop reason: HOSPADM

## 2023-01-20 RX ORDER — ONDANSETRON 2 MG/ML
4 INJECTION INTRAMUSCULAR; INTRAVENOUS EVERY 6 HOURS PRN
Status: DISCONTINUED | OUTPATIENT
Start: 2023-01-20 | End: 2023-01-20 | Stop reason: HOSPADM

## 2023-01-20 RX ORDER — PROPOFOL 10 MG/ML
INJECTION, EMULSION INTRAVENOUS PRN
Status: DISCONTINUED | OUTPATIENT
Start: 2023-01-20 | End: 2023-01-20

## 2023-01-20 RX ORDER — SODIUM CHLORIDE, SODIUM LACTATE, POTASSIUM CHLORIDE, CALCIUM CHLORIDE 600; 310; 30; 20 MG/100ML; MG/100ML; MG/100ML; MG/100ML
INJECTION, SOLUTION INTRAVENOUS CONTINUOUS
Status: DISCONTINUED | OUTPATIENT
Start: 2023-01-20 | End: 2023-01-20 | Stop reason: HOSPADM

## 2023-01-20 RX ORDER — FLUMAZENIL 0.1 MG/ML
0.2 INJECTION, SOLUTION INTRAVENOUS
Status: DISCONTINUED | OUTPATIENT
Start: 2023-01-20 | End: 2023-01-20 | Stop reason: HOSPADM

## 2023-01-20 RX ORDER — DEXMEDETOMIDINE HYDROCHLORIDE 4 UG/ML
INJECTION, SOLUTION INTRAVENOUS PRN
Status: DISCONTINUED | OUTPATIENT
Start: 2023-01-20 | End: 2023-01-20

## 2023-01-20 RX ORDER — ONDANSETRON 2 MG/ML
INJECTION INTRAMUSCULAR; INTRAVENOUS PRN
Status: DISCONTINUED | OUTPATIENT
Start: 2023-01-20 | End: 2023-01-20

## 2023-01-20 RX ADMIN — PROPOFOL 30 MG: 10 INJECTION, EMULSION INTRAVENOUS at 09:13

## 2023-01-20 RX ADMIN — ONDANSETRON 4 MG: 2 INJECTION INTRAMUSCULAR; INTRAVENOUS at 09:09

## 2023-01-20 RX ADMIN — PHENYLEPHRINE HYDROCHLORIDE 100 MCG: 10 INJECTION INTRAVENOUS at 09:44

## 2023-01-20 RX ADMIN — PROPOFOL 10 MG: 10 INJECTION, EMULSION INTRAVENOUS at 09:33

## 2023-01-20 RX ADMIN — PROPOFOL 20 MG: 10 INJECTION, EMULSION INTRAVENOUS at 09:20

## 2023-01-20 RX ADMIN — PROPOFOL 10 MG: 10 INJECTION, EMULSION INTRAVENOUS at 09:24

## 2023-01-20 RX ADMIN — PROPOFOL 20 MG: 10 INJECTION, EMULSION INTRAVENOUS at 09:11

## 2023-01-20 RX ADMIN — SODIUM CHLORIDE, POTASSIUM CHLORIDE, SODIUM LACTATE AND CALCIUM CHLORIDE: 600; 310; 30; 20 INJECTION, SOLUTION INTRAVENOUS at 09:08

## 2023-01-20 RX ADMIN — PHENYLEPHRINE HYDROCHLORIDE 100 MCG: 10 INJECTION INTRAVENOUS at 09:33

## 2023-01-20 RX ADMIN — SCOPALAMINE 1 PATCH: 1 PATCH, EXTENDED RELEASE TRANSDERMAL at 09:06

## 2023-01-20 RX ADMIN — DEXMEDETOMIDINE HYDROCHLORIDE 12 MCG: 200 INJECTION INTRAVENOUS at 09:10

## 2023-01-20 RX ADMIN — LIDOCAINE HYDROCHLORIDE 20 MG: 20 INJECTION, SOLUTION INFILTRATION; PERINEURAL at 09:09

## 2023-01-20 RX ADMIN — DEXMEDETOMIDINE HYDROCHLORIDE 8 MCG: 200 INJECTION INTRAVENOUS at 09:13

## 2023-01-20 RX ADMIN — PROPOFOL 200 MCG/KG/MIN: 10 INJECTION, EMULSION INTRAVENOUS at 09:10

## 2023-01-20 RX ADMIN — ONDANSETRON: 2 INJECTION INTRAMUSCULAR; INTRAVENOUS at 10:22

## 2023-01-20 ASSESSMENT — COPD QUESTIONNAIRES
COPD: 1
CAT_SEVERITY: MODERATE

## 2023-01-20 ASSESSMENT — ACTIVITIES OF DAILY LIVING (ADL)
ADLS_ACUITY_SCORE: 35
ADLS_ACUITY_SCORE: 33

## 2023-01-20 NOTE — ANESTHESIA CARE TRANSFER NOTE
Patient: Swetha Patterson    Procedure: Procedure(s):  COLONOSCOPY, FLEXIBLE, WITH LESION REMOVAL USING SNARE       Diagnosis: Constipation [K59.00]  Diagnosis Additional Information: No value filed.    Anesthesia Type:   MAC     Note:    Oropharynx: oropharynx clear of all foreign objects and spontaneously breathing  Level of Consciousness: drowsy and awake  Oxygen Supplementation: room air    Independent Airway: airway patency satisfactory and stable  Dentition: dentition unchanged  Vital Signs Stable: post-procedure vital signs reviewed and stable  Report to RN Given: handoff report given  Patient transferred to: Phase II    Handoff Report: Identifed the Patient, Identified the Reponsible Provider, Reviewed the pertinent medical history, Discussed the surgical course, Reviewed Intra-OP anesthesia mangement and issues during anesthesia, Set expectations for post-procedure period and Allowed opportunity for questions and acknowledgement of understanding      Vitals:  Vitals Value Taken Time   /67 01/20/23 0948   Temp     Pulse 68 01/20/23 0949   Resp 13 01/20/23 0949   SpO2 96 % 01/20/23 0949   Vitals shown include unvalidated device data.    Electronically Signed By: LEIGH Bower CRNA  January 20, 2023  9:50 AM

## 2023-01-20 NOTE — ANESTHESIA POSTPROCEDURE EVALUATION
Patient: Swetha Patterson    Procedure: Procedure(s):  COLONOSCOPY, FLEXIBLE, WITH LESION REMOVAL USING SNARE       Anesthesia Type:  MAC    Note:  Disposition: Outpatient   Postop Pain Control: Uneventful            Sign Out: Well controlled pain   PONV: No   Neuro/Psych: Uneventful            Sign Out: Acceptable/Baseline neuro status   Airway/Respiratory: Uneventful            Sign Out: Acceptable/Baseline resp. status   CV/Hemodynamics: Uneventful            Sign Out: Acceptable CV status   Other NRE: NONE   DID A NON-ROUTINE EVENT OCCUR? No           Last vitals:  Vitals Value Taken Time   /67 01/20/23 0948   Temp     Pulse 72 01/20/23 0959   Resp 28 01/20/23 0959   SpO2 97 % 01/20/23 1026   Vitals shown include unvalidated device data.    Electronically Signed By: Filemon Aleman MD  January 20, 2023  12:55 PM

## 2023-01-20 NOTE — ANESTHESIA PREPROCEDURE EVALUATION
"Anesthesia Pre-Procedure Evaluation    Patient: Swetha Patterson   MRN: [id number] : 1970        Procedure : Procedure(s):  COLONOSCOPY          Past Medical History:   Diagnosis Date     Anxiety state, unspecified      Arthritis      Asthma      Chronic pain     back pain from cyst     Contact dermatitis and other eczema, due to unspecified cause      COPD (chronic obstructive pulmonary disease) (H)      Depressive disorder      Depressive disorder, not elsewhere classified      Diabetes (H)      Emphysema with chronic bronchitis (H)      Esophageal reflux      Family history of ischemic heart disease      Fibromyalgia      Gastro-oesophageal reflux disease      Heart disease     has chest pain sometimes     History of emphysema      Hoarseness      HTN, goal below 140/90      Hyperlipidemia LDL goal <130      Liver disease     \"fatty liver\"     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)      PONV (postoperative nausea and vomiting)      Rectal bleeding      Thyroid disease       Past Surgical History:   Procedure Laterality Date     ABDOMEN SURGERY       ANESTHESIA OUT OF OR MRI N/A 10/07/2021    Procedure: ANESTHESIA OUT OF OR MRI;  Surgeon: GENERIC ANESTHESIA PROVIDER;  Location: [facility] OR     ARTHROSCOPY SHOULDER DECOMPRESSION Left 10/21/2015    Procedure: ARTHROSCOPY SHOULDER DECOMPRESSION;  Surgeon: Julien Milian MD;  Location: [facility] OR     BIOPSY ARTERY TEMPORAL Left 2020    Procedure: LEFT TEMPORAL ARTERY BIOPSY;  Surgeon: Ibeth Conner MD;  Location: [facility] OR     BRONCHIAL THERMOPLASTY N/A 2014    Procedure: BRONCHIAL THERMOPLASTY;  Surgeon: Ward Whitaker MD;  Location: [facility]     BRONCHIAL THERMOPLASTY N/A 2014    Procedure: BRONCHIAL THERMOPLASTY;  Surgeon: Ward Whitaker MD;  Location: [facility] OR     BRONCHIAL THERMOPLASTY N/A 2015    Procedure: BRONCHIAL THERMOPLASTY;  Surgeon: Ward Whitaker MD;  Location: [facility]     COLONOSCOPY N/A 2018    " Procedure: COLONOSCOPY ([facility]);  Surgeon: Marshall Oakes MD;  Location:  OR     COLONOSCOPY N/A 10/22/2020    Procedure: Colonoscopy;  Surgeon: Marshall Mccormick MD;  Location:  OR     DISCECTOMY, FUSION CERVICAL ANTERIOR ONE LEVEL, COMBINED N/A 05/01/2018    Procedure: COMBINED DISCECTOMY, FUSION CERVICAL ANTERIOR ONE LEVEL;  1.  C5-C6 anterior cervical diskectomy and fusion.    2.  C5-C6 application of intervertebral biomechanical device for interbody fusion purposes.    3.  C5-C6 anterior instrumentation using the standard Kristin InViZia 24 mm plate with four associated bone screws, 12 mm in length.  Inferior screws are fixed.  Superior screws are va     ENT SURGERY  2015    polyps removed from vocal cords      ESOPHAGOSCOPY, GASTROSCOPY, DUODENOSCOPY (EGD), COMBINED N/A 10/22/2020    Procedure: Esophagoscopy, gastroscopy, duodenoscopy with biopsies;  Surgeon: Marshall Mccormick MD;  Location:  OR     ESOPHAGOSCOPY, GASTROSCOPY, DUODENOSCOPY (EGD), COMBINED N/A 06/17/2021    Procedure: ESOPHAGOGASTRODUODENOSCOPY, WITH BIOPSY;  Surgeon: Darrel Damon MD;  Location:  GI     EXCISE MASS TRUNK Left 11/03/2021    Procedure: EXCISION LEFT SHOULDER LIPOMA;  Surgeon: Ibeth Conner MD;  Location:  OR     EXCISE NODE MEDIASTINAL  04/26/2013    Procedure: EXCISE NODE MEDIASTINAL;;  Surgeon: Av Peña MD;  Location:  OR     HEAD & NECK SURGERY  2018    Had my five and six fused in my neck     LAPAROSCOPIC CHOLECYSTECTOMY N/A 10/19/2017    Procedure: LAPAROSCOPIC CHOLECYSTECTOMY;  LAPAROSCOPIC CHOLECYSTECTOMY;  Surgeon: eNy Jerry MD;  Location:  OR     THORACOSCOPY  04/26/2013    Procedure: THORACOSCOPY;  LEFT VIDEO ASSISTED THORACOSCOPY, RESECTION OF POSTERIOR MEDIASTINAL MASS;  Surgeon: Av Peña MD;  Location:  OR     TONSILLECTOMY  as a kid     TONSILLECTOMY       ZZC APPENDECTOMY  at age 17      Allergies   Allergen Reactions     Codeine Nausea and  Vomiting     vomiting     Cyclobenzaprine Nausea     Gabapentin Rash     Hydrocodone Nausea and Vomiting     Sulfa Drugs Nausea and Vomiting     Nausea      Social History     Tobacco Use     Smoking status: Former     Types: Other     Smokeless tobacco: Former     Quit date: 2/13/2022     Tobacco comments:     Quit smoking prior to bariatric surgery and because of my COPD   Substance Use Topics     Alcohol use: Not Currently      Wt Readings from Last 1 Encounters:   01/13/23 97.2 kg (214 lb 3.2 oz)        Anesthesia Evaluation   Pt has had prior anesthetic.     History of anesthetic complications  - PONV.      ROS/MED HX  ENT/Pulmonary:     (+) moderate,  COPD,  (-) asthma   Neurologic:       Cardiovascular:     (+) hypertension-----    METS/Exercise Tolerance:     Hematologic:       Musculoskeletal:       GI/Hepatic:     (+) GERD, Asymptomatic on medication, hepatitis liver disease (fatty liver),     Renal/Genitourinary:       Endo:     (+) type II DM, thyroid problem, Obesity,     Psychiatric/Substance Use:     (+) psychiatric history anxiety and depression     Infectious Disease:       Malignancy:       Other:            Physical Exam    Airway        Mallampati: II   TM distance: > 3 FB   Neck ROM: full   Mouth opening: > 3 cm    Respiratory Devices and Support         Dental       (+) Modest Abnormalities - crowns, retainers, 1 or 2 missing teeth      Cardiovascular   cardiovascular exam normal          Pulmonary   pulmonary exam normal                OUTSIDE LABS:  CBC:   Lab Results   Component Value Date    WBC 9.0 12/23/2022    WBC 8.2 08/17/2022    HGB 11.7 12/23/2022    HGB 13.1 08/17/2022    HCT 36.3 12/23/2022    HCT 40.0 08/17/2022     12/23/2022     08/17/2022     BMP:   Lab Results   Component Value Date     12/23/2022     08/17/2022    POTASSIUM 4.2 12/23/2022    POTASSIUM 4.0 08/17/2022    CHLORIDE 101 12/23/2022    CHLORIDE 103 08/17/2022    CO2 24 12/23/2022    CO2 26  08/17/2022    BUN 17.4 12/23/2022    BUN 15 08/17/2022    CR 0.96 (H) 12/23/2022    CR 0.80 08/17/2022     (H) 12/23/2022     (H) 08/17/2022     COAGS:   Lab Results   Component Value Date    INR 0.99 11/20/2020     POC:   Lab Results   Component Value Date    BGM 90 11/21/2020    HCG Negative 12/23/2022    HCGS Negative 11/12/2021     HEPATIC:   Lab Results   Component Value Date    ALBUMIN 3.9 08/17/2022    PROTTOTAL 7.5 08/17/2022    ALT 47 08/17/2022    AST 24 08/17/2022    ALKPHOS 179 (H) 08/17/2022    BILITOTAL 0.2 08/17/2022     OTHER:   Lab Results   Component Value Date    PH 7.38 11/12/2021    LACT 1.8 11/12/2021    A1C 5.5 08/17/2022    GENE 9.2 12/23/2022    MAG 1.9 03/19/2013    LIPASE 101 05/22/2021    TSH 6.29 (H) 08/17/2022    T4 0.96 08/17/2022    CRP 16.0 (H) 02/20/2020    SED 16 02/20/2020       Anesthesia Plan    ASA Status:  3      Anesthesia Type: MAC.              Consents    Anesthesia Plan(s) and associated risks, benefits, and realistic alternatives discussed. Questions answered and patient/representative(s) expressed understanding.    - Discussed:     - Discussed with:  Patient         Postoperative Care    Pain management: IV analgesics.   PONV prophylaxis: Ondansetron (or other 5HT-3), Dexamethasone or Solumedrol     Comments:                Celia Benito

## 2023-01-24 ENCOUNTER — OFFICE VISIT (OUTPATIENT)
Dept: SURGERY | Facility: CLINIC | Age: 53
End: 2023-01-24
Payer: COMMERCIAL

## 2023-01-24 ENCOUNTER — TELEPHONE (OUTPATIENT)
Dept: SURGERY | Facility: CLINIC | Age: 53
End: 2023-01-24

## 2023-01-24 VITALS
DIASTOLIC BLOOD PRESSURE: 68 MMHG | WEIGHT: 200 LBS | OXYGEN SATURATION: 97 % | SYSTOLIC BLOOD PRESSURE: 115 MMHG | BODY MASS INDEX: 35.44 KG/M2 | HEIGHT: 63 IN | HEART RATE: 82 BPM

## 2023-01-24 DIAGNOSIS — D17.30 LIPOMA OF SKIN AND SUBCUTANEOUS TISSUE: Primary | ICD-10-CM

## 2023-01-24 DIAGNOSIS — R11.0 NAUSEA: ICD-10-CM

## 2023-01-24 PROCEDURE — 99213 OFFICE O/P EST LOW 20 MIN: CPT | Performed by: SURGERY

## 2023-01-24 NOTE — TELEPHONE ENCOUNTER
Type of surgery: EXCISION LEFT NECK MASS      Location of surgery: [facility]  Date and time of surgery: 2/1/2023  @ 12:45 PM   Surgeon: Ibeth Conner MD   Pre-Op Appt Date: PATIENT TO SCHEDULE    Post-Op Appt Date: PATIENT TO SCHEDULE     Packet sent out: Yes  Pre-cert/Authorization completed:  Not Applicable  Date: 1/24/2023       EXCISION LEFT NECK MASS     GENERAL PT INST TO HAVE H&P WITH DR PASTOR  40 MIN REQ NON MGB NMS

## 2023-01-24 NOTE — LETTER
[facility]    [address], [address]  [place], [place] [address]  Phone [phone]  Fax [phone] [address], [address]  [facility] [address]  Phone [phone]  Fax [phone]    [email or web address]   Showering Before Surgery      Your surgeon has asked you to take 2 showers before surgery.    Why is this important?  It is normal for bacteria (germs) to be on your skin. The skin protects us from these germs. When you have surgery, we cut the skin. Sometimes germs get into the cuts and cause infection (illness caused by germs). By following the instructions below and using special soap, you will lower the number of germs on your skin. This decreases your chance of infection.  Special soap  Buy or get 8 ounces of antiseptic surgical soap called 4% CHG. Common name brands of this soap are Hibiclens and Exidine.  You can find it at your local pharmacy, clinic or retail store. If you have trouble, ask your pharmacist to help you find the right substitute.  A note about shaving:  Do not shave within 12 inches of your incision (surgical cut) area for at least 3 days before surgery. Shaving can make small cuts in the skin. This puts you at a higher risk of infection.  Items you will need for each shower:   1 newly washed towel   4 ounces of one of the above soaps   Clean pajamas or clothes to change into    Follow these instructions:  Follow these steps the evening before surgery and the morning of surgery.  1. Wash your hair and body with your regular shampoo and soap. Make sure you rinse the shampoo and soap from your hair and body.  2. Using clean hands, apply about 2 ounces of soap gently on your skin from your ear lobes to your toes. Use on your groin area last. Do not use this soap on your face or head. If you get any soap in your eyes, ears or mouth, rinse right away.  3. Repeat step 2. It is very important to let the  soap stay on your skin for at least 1 minute.    4. Rinse well and dry off using a clean towel.    If you feel any tingling, itching or other irritation, rinse right away. It is normal to feel some coolness on the skin after using the antiseptic soap. Your skin may feel a bit dry after the shower, but do not use any lotions, creams or moisturizers. Do not use hair spray or other products in your hair.  5.  Dress in freshly washed clothes or pajamas. Use fresh pillowcases and sheets on your bed.    Repeat these steps the morning of surgery.  If you have any questions about showering or an allergy to CHG soap, please call your surgery center.

## 2023-01-24 NOTE — PROGRESS NOTES
Assessment:   Swetha Patterson is a 52 year old female seen in consultation for a lipoma on her left shoulder.  The lipoma is in close proximity to a left shoulder lipoma and I had excised in November 2021.  It is slightly more medial, 2.5 cm in diameter and is becoming increasingly bothersome.     Plan:  We have discussed the options of observation and excision.  She elects excision.  We discussed the indications, alternatives, and risks.  We discussed scarring, numbness, anesthesia, infection, bleeding, and recurrence.    We will schedule surgery at the patient's convenience.      HPI:  Swetha Patterson is a 52 year old female presents today for a mass on her left shoulder.  She underwent an excision of a lipoma near this location in November 2021.  Pathology at that time was consistent with a benign lipoma.  She was feeling well and notes no masses until approximately 1 month ago.  Now, she feels a 2 cm ovoid mass just medial to her previous incision which is tender when pressed on or rubbed on with clothing or straps.  When the mass is pressed on, she feels that her pain radiates up and down her neck and contributes to headaches.     Duration: 1 month  H/o trauma:  No, previous lipoma excision nearby approximately 14 months ago   drainage:  No  Previous infections:  No  Other such masses now or in the past: Yes, had a lipoma excised from the left shoulder 4 months ago.  Believes she has another 1 starting on the right shoulder, though this is small and not bothersome at this time.   Family h/o similar masses:  No  Pain:  Yes  Size:  slowly increasing    Past Medical History:   has a past medical history of Anxiety state, unspecified, Arthritis, Asthma, Chronic pain, Contact dermatitis and other eczema, due to unspecified cause, COPD (chronic obstructive pulmonary disease) (H), Depressive disorder, Depressive disorder, not elsewhere classified, Diabetes (H), Emphysema with chronic bronchitis (H), Esophageal  reflux, Family history of ischemic heart disease, Fibromyalgia, Gastro-oesophageal reflux disease, Heart disease, History of emphysema, Hoarseness, HTN, goal below 140/90, Hyperlipidemia LDL goal <130, Liver disease, 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), PONV (postoperative nausea and vomiting), Rectal bleeding, and Thyroid disease.    Past Surgical History:  Past Surgical History:   Procedure Laterality Date     ABDOMEN SURGERY       ANESTHESIA OUT OF OR MRI N/A 10/07/2021    Procedure: ANESTHESIA OUT OF OR MRI;  Surgeon: GENERIC ANESTHESIA PROVIDER;  Location: [facility]     ARTHROSCOPY SHOULDER DECOMPRESSION Left 10/21/2015    Procedure: ARTHROSCOPY SHOULDER DECOMPRESSION;  Surgeon: Julien Milian MD;  Location: [facility] OR     BIOPSY ARTERY TEMPORAL Left 03/11/2020    Procedure: LEFT TEMPORAL ARTERY BIOPSY;  Surgeon: Ibeth Conner MD;  Location: [facility]     BRONCHIAL THERMOPLASTY N/A 11/14/2014    Procedure: BRONCHIAL THERMOPLASTY;  Surgeon: Ward Whitaker MD;  Location: [facility]     BRONCHIAL THERMOPLASTY N/A 12/19/2014    Procedure: BRONCHIAL THERMOPLASTY;  Surgeon: Ward Whitaker MD;  [facility]     BRONCHIAL THERMOPLASTY N/A 02/06/2015    Procedure: BRONCHIAL THERMOPLASTY;  Surgeon: Ward Whitaker MD;  [facility]: [facility]     COLONOSCOPY N/A 11/26/2018    Procedure: COLONOSCOPY ([facility]);  Surgeon: Marshall Oakes MD;  Location: [facility]     COLONOSCOPY N/A 10/22/2020    Procedure: Colonoscopy;  Surgeon: Marshall Mccormick MD;  Location:  OR     COLONOSCOPY N/A 1/20/2023    Procedure: COLONOSCOPY, FLEXIBLE, WITH LESION REMOVAL USING SNARE;  Surgeon: Carson Domínguez MD;  Location:  GI     DISCECTOMY, FUSION CERVICAL ANTERIOR ONE LEVEL, COMBINED N/A 05/01/2018    Procedure: COMBINED DISCECTOMY, FUSION CERVICAL ANTERIOR ONE LEVEL;  1.  C5-C6 anterior cervical diskectomy and fusion.    2.  C5-C6 application of intervertebral biomechanical device for interbody fusion purposes.     3.  C5-C6 anterior instrumentation using the standard Kristin InViZia 24 mm plate with four associated bone screws, 12 mm in length.  Inferior screws are fixed.  Superior screws are va     ENT SURGERY  2015    polyps removed from vocal cords      ESOPHAGOSCOPY, GASTROSCOPY, DUODENOSCOPY (EGD), COMBINED N/A 10/22/2020    Procedure: Esophagoscopy, gastroscopy, duodenoscopy with biopsies;  Surgeon: Marshall Mccormick MD;  Location:  OR     ESOPHAGOSCOPY, GASTROSCOPY, DUODENOSCOPY (EGD), COMBINED N/A 06/17/2021    Procedure: ESOPHAGOGASTRODUODENOSCOPY, WITH BIOPSY;  Surgeon: Darrel Damon MD;  Location:  GI     EXCISE MASS TRUNK Left 11/03/2021    Procedure: EXCISION LEFT SHOULDER LIPOMA;  Surgeon: Ibeth Conner MD;  Location:  OR     EXCISE NODE MEDIASTINAL  04/26/2013    Procedure: EXCISE NODE MEDIASTINAL;;  Surgeon: Av Peña MD;  Location:  OR     HEAD & NECK SURGERY  2018    Had my five and six fused in my neck     LAPAROSCOPIC CHOLECYSTECTOMY N/A 10/19/2017    Procedure: LAPAROSCOPIC CHOLECYSTECTOMY;  LAPAROSCOPIC CHOLECYSTECTOMY;  Surgeon: Ney Jerry MD;  Location:  OR     THORACOSCOPY  04/26/2013    Procedure: THORACOSCOPY;  LEFT VIDEO ASSISTED THORACOSCOPY, RESECTION OF POSTERIOR MEDIASTINAL MASS;  Surgeon: Av Peña MD;  Location:  OR     TONSILLECTOMY  as a kid     TONSILLECTOMY       ZZC APPENDECTOMY  at age 17        Social History:  Social History     Socioeconomic History     Marital status:      Spouse name: Not on file     Number of children: Not on file     Years of education: Not on file     Highest education level: Not on file   Occupational History     Not on file   Tobacco Use     Smoking status: Former     Types: Other     Smokeless tobacco: Former     Quit date: 2/13/2022     Tobacco comments:     Quit smoking prior to bariatric surgery and because of my COPD   Vaping Use     Vaping Use: Never used   Substance and Sexual Activity      Alcohol use: Not Currently     Drug use: No     Sexual activity: Not Currently     Partners: Male     Birth control/protection: Abstinence, None     Comment: I I don't get my menstrual. No more   Other Topics Concern      Service Not Asked     Blood Transfusions Not Asked     Caffeine Concern No     Comment: 4-5 cans of pop daily     Occupational Exposure Not Asked     Hobby Hazards Not Asked     Sleep Concern Not Asked     Stress Concern Not Asked     Weight Concern Not Asked     Special Diet No     Back Care Not Asked     Exercise No     Comment: on hold     Bike Helmet Not Asked     Seat Belt Not Asked     Self-Exams Not Asked     Parent/sibling w/ CABG, MI or angioplasty before 65F 55M? Yes   Social History Narrative     Not on file     Social Determinants of Health     Financial Resource Strain: Not on file   Food Insecurity: Not on file   Transportation Needs: Not on file   Physical Activity: Not on file   Stress: Not on file   Social Connections: Not on file   Intimate Partner Violence: Not on file   Housing Stability: Not on file        Family History:  Family History   Problem Relation Age of Onset     Heart Disease Mother         murmur, mi     Hypertension Mother      Cerebrovascular Disease Mother      Depression Mother      Gallbladder Disease Mother      Asthma Mother      Thyroid Disease Mother         And my mother had it also     Obesity Mother         And myself     Family History Negative Father         does not know  him     Family History Negative Brother      Heart Disease Maternal Grandfather      Asthma Maternal Grandfather      Hypertension Maternal Grandfather      Cerebrovascular Disease Maternal Grandfather      Diabetes Maternal Grandfather      Asthma Sister      Hypertension Sister      Cerebrovascular Disease Sister      Hyperlipidemia Sister      Anxiety Disorder Sister      Hypertension Maternal Grandmother      Cerebrovascular Disease Maternal Grandmother       "Hypertension Gene Milian is my sister Jigna is my mom     Family history reviewed and not pertinent.    ROS:  The 10 point review of systems is negative other than noted in the HPI and above.    PE:  /68   Pulse 82   Ht 1.6 m (5' 3\")   Wt 90.7 kg (200 lb)   LMP 04/15/2016 (Exact Date)   SpO2 97%   BMI 35.43 kg/m    General appearance: well-developed, well-nourished, no apparent distress  HEENT:  Head normocephalic and atraumatic, pupils equal and round, conjunctivae clear, mucous membranes moist, external ears and nose normal  Lungs: respirations unlabored  Musculoskeletal:  Normal station and gait  Extremities: warm, without edema  Neurologic: alert, speech is clear, moves all extremities with good strength  Psychiatric: Mood and affect are appropriate  Skin: There is a 2.5 cm subcutaneous mass on/in the left shoulder.  The overlying skin is normal.  There is a well approximated surgical scar just lateral to the mass. it is mildly tender. It is soft and mobile.    25 minutes total time spent on the date of this encounter doing: chart review, review of test results, patient visit, physical exam, education, counseling, developing plan of care, and documenting.    Ibeth Conner MD    Please route or send letter to:  Referring Provider      "

## 2023-01-24 NOTE — LETTER
[facility]    [address], [address]  [place], [place] [address]  Phone [phone]  Fax [phone]    [address], [address]  [place] [address]  Phone [phone]  Fax [phone]    [email or web address]   2023     Swetha Patterson    RE: [id number]  : 1970    Swetha Patterson has been scheduled for surgery on 2023 at 12:45 PM  at [facility] with Dr Ibeth Conner.  The hospital is located at [address] in [place].      Please check in at the Surgery reception desk at 10:45 AM . This is located in the back of the hospital on the East side, just past the Emergency Room entrance.       DO NOT EAT OR DRINK ANYTHING 8 HOURS BEFORE YOUR ARRIVAL TIME.   You may have sips of clear liquids up until 2 hours before your arrival time. If you have been advised to take your medication, please do this early in the morning with just sips of clear liquid.        Hospital regulations require an updated pre-operative examination to be completed within 30 days of the procedure. This can be done by your primary care provider. Please ask them to fax documentation to [phone]. We also recommend you bring a copy with you.       You should shower before your surgery with Hibiclens or Exidine soap.  This can be found at your local pharmacy or you can pick it up from our office for free.  Please call our office if you have any questions.       You will be required to have an Adult (friend or family member) drive you home after your surgery and arrange for an adult to stay with you until the next morning.       You will receive several calls from our staff 3-7 days prior to your scheduled procedure with further details and to answer any questions you may have.      It is sometimes necessary to adjust the surgery schedule due to emergencies and additions to the schedule.  If your surgery is  affected by this, we greatly appreciate your flexibility and understanding in this matter      It is best if you call regarding post-operative questions between the hours of 8:00 am & 3:00 pm Monday-Friday, so you have access to the daytime care team that know you best.  ? Prescription refills are accepted during regular office hours only.      Please do not bring any Disability or FMLA papers to the hospital.  They need to be either faxed ([phone]), mailed or hand delivered to our office by you or a family member for completion.  ? Please allow 14 business days to complete paperwork.    If you have questions or concerns, please contact our office at [phone].

## 2023-01-25 RX ORDER — ONDANSETRON 8 MG/1
TABLET, FILM COATED ORAL
Qty: 45 TABLET | Refills: 1 | Status: SHIPPED | OUTPATIENT
Start: 2023-01-25 | End: 2023-01-27

## 2023-01-25 NOTE — TELEPHONE ENCOUNTER
Pending Prescriptions:                       Disp   Refills    ondansetron (ZOFRAN) 8 MG tablet [Pharmac*45 tab*1            Sig: TAKE ONE-HALF TO ONE TABLET BY MOUTH EVERY 8           HOURS AS NEEDED FOR NAUSEA    Prescription approved per [facility] Refill Protocol.

## 2023-01-26 ENCOUNTER — TELEPHONE (OUTPATIENT)
Dept: DERMATOLOGY | Facility: CLINIC | Age: 53
End: 2023-01-26

## 2023-01-26 DIAGNOSIS — L73.2 HIDRADENITIS SUPPURATIVA: ICD-10-CM

## 2023-01-26 RX ORDER — SULFAMETHOXAZOLE/TRIMETHOPRIM 800-160 MG
1 TABLET ORAL 2 TIMES DAILY
OUTPATIENT
Start: 2023-01-26

## 2023-01-26 NOTE — TELEPHONE ENCOUNTER
sulfamethoxazole-trimethoprim (BACTRIM DS) 800-160 MG tablet  Last Written Prescription Date:   4/7/2022  Last Fill Quantity: 180,   # refills: 1  Last Office Visit :  4/7/2022  Future Office visit:  None    Routing refill request to provider for review/approval because:  Several request  Pt needs updated office visit  Refer to scheduling and Provider for review       Madonna Pang RN  Central Triage Red Flags/Med Refills

## 2023-01-27 ENCOUNTER — TELEPHONE (OUTPATIENT)
Dept: INTERNAL MEDICINE | Facility: CLINIC | Age: 53
End: 2023-01-27

## 2023-01-27 ENCOUNTER — OFFICE VISIT (OUTPATIENT)
Dept: FAMILY MEDICINE | Facility: CLINIC | Age: 53
End: 2023-01-27
Payer: COMMERCIAL

## 2023-01-27 VITALS
BODY MASS INDEX: 37.47 KG/M2 | TEMPERATURE: 98 F | SYSTOLIC BLOOD PRESSURE: 100 MMHG | HEIGHT: 63 IN | HEART RATE: 74 BPM | OXYGEN SATURATION: 96 % | WEIGHT: 211.5 LBS | RESPIRATION RATE: 20 BRPM | DIASTOLIC BLOOD PRESSURE: 69 MMHG

## 2023-01-27 DIAGNOSIS — E66.01 CLASS 3 SEVERE OBESITY DUE TO EXCESS CALORIES WITH SERIOUS COMORBIDITY AND BODY MASS INDEX (BMI) OF 40.0 TO 44.9 IN ADULT (H): ICD-10-CM

## 2023-01-27 DIAGNOSIS — Z01.818 PREOP GENERAL PHYSICAL EXAM: Primary | ICD-10-CM

## 2023-01-27 DIAGNOSIS — J44.9 COPD, MODERATE (H): ICD-10-CM

## 2023-01-27 DIAGNOSIS — Z53.9 DIAGNOSIS NOT YET DEFINED: Primary | ICD-10-CM

## 2023-01-27 DIAGNOSIS — Z12.31 VISIT FOR SCREENING MAMMOGRAM: ICD-10-CM

## 2023-01-27 DIAGNOSIS — E66.813 CLASS 3 SEVERE OBESITY DUE TO EXCESS CALORIES WITH SERIOUS COMORBIDITY AND BODY MASS INDEX (BMI) OF 40.0 TO 44.9 IN ADULT (H): ICD-10-CM

## 2023-01-27 DIAGNOSIS — D17.30 LIPOMA OF SKIN AND SUBCUTANEOUS TISSUE: ICD-10-CM

## 2023-01-27 DIAGNOSIS — K59.00 CONSTIPATION, UNSPECIFIED CONSTIPATION TYPE: ICD-10-CM

## 2023-01-27 PROCEDURE — G0179 MD RECERTIFICATION HHA PT: HCPCS | Performed by: INTERNAL MEDICINE

## 2023-01-27 PROCEDURE — 99214 OFFICE O/P EST MOD 30 MIN: CPT | Performed by: PHYSICIAN ASSISTANT

## 2023-01-27 ASSESSMENT — PAIN SCALES - GENERAL: PAINLEVEL: MODERATE PAIN (5)

## 2023-01-27 NOTE — PROGRESS NOTES
[facility]  [address] [place] [address], [address]  [facility], [place] [address]  Phone: [phone]  Primary Provider: Roro Chawla  Pre-op Performing Provider: KELBY AYALA      PREOPERATIVE EVALUATION:  Today's date: 1/27/2023    Swetha Patterson is a 52 year old female who presents for a preoperative evaluation.    Surgical Information:  Surgery/Procedure: EXCISION, MASS, NECK left  Surgery Location: [facility]  Surgeon: Ibeth Conner MD  Surgery Date: 2/1/2023  Time of Surgery: 12:50pm  Where patient plans to recover: At home with family  Fax number for surgical facility: Note does not need to be faxed, will be available electronically in Epic.    Type of Anesthesia Anticipated: General    Assessment & Plan     The proposed surgical procedure is considered LOW risk.    (Z01.818) Preop general physical exam  (primary encounter diagnosis)  Comment: not on blood thinners, recently tolerated anesthesia w/colonoscopy, recent labs and EKG reviewed  Plan: cleared for minor procedure, see med instructions below     (D17.30) Lipoma of skin and subcutaneous tissue  Comment: left neck  Plan: surgery as planned     (E66.01,  Z68.41) Class 3 severe obesity due to excess calories with serious comorbidity and body mass index (BMI) of 40.0 to 44.9 in adult (H)  Comment: at risk for AJ,  Plan:  please monitor O2 sats and cardiopulmonary status closely in the perioperative period    (Z12.31) Visit for screening mammogram  Comment: overdue  Plan: *MA Screening Digital Bilateral      Risks and Recommendations:  The patient has the following additional risks and recommendations for perioperative complications:   - Consult Hospitalist / IM to assist with post-op medical management  Diabetes:  - Patient is not on insulin therapy: regular NPO guidelines can be followed.     Medication Instructions:  Stop naltrexone 3 days prior to surgery, don't start until after  you have been off all opioids for two weeks     Don't take Rybelsus 24 hours prior to surgery    Don't take vitamins/supplements, lasix, metformin, klonopin, hydroxyzine or lisinopril the am of surgery (okay to take after surgery when eating/drinking)    Take amlodipine, lamotrigine, omeprazole, bactrim, wellbutrin and levothyroxine with a sip of water the am of surgery    RECOMMENDATION:  APPROVAL GIVEN to proceed with proposed procedure, without further diagnostic evaluation.    Lore Francis PA-C  30 minutes on the day of the encounter doing chart review, history and exam, documentation and further activities as noted above.          Subjective     HPI related to upcoming procedure:  Swetha is here for pre-op for lipoma removal.      She is diabetic.  She does not smoke.    She generally feels okay.  She is working on weight loss and has last 30-40 pounds with diet change.    She walks regularly--6 blocks daily and stairs.  She is able to walk up a flight of stairs without chest pain and with minimal dyspnea.      She denies history of MI, CVA or blood clots.    Preop Questions 1/26/2023   1. Have you ever had a heart attack or stroke? No   2. Have you ever had surgery on your heart or blood vessels, such as a stent placement, a coronary artery bypass, or surgery on an artery in your head, neck, heart, or legs? No   3. Do you have chest pain with activity? No   4. Do you have a history of  heart failure? No   5. Do you currently have a cold, bronchitis or symptoms of other infection? No   6. Do you have a cough, shortness of breath, or wheezing? No   7. Do you or anyone in your family have previous history of blood clots? No   8. Do you or does anyone in your family have a serious bleeding problem such as prolonged bleeding following surgeries or cuts? No   9. Have you ever had problems with anemia or been told to take iron pills? No   10. Have you had any abnormal blood loss such as black, tarry  or bloody stools, or abnormal vaginal bleeding? No   11. Have you ever had a blood transfusion? No   12. Are you willing to have a blood transfusion if it is medically needed before, during, or after your surgery? Yes   13. Have you or any of your relatives ever had problems with anesthesia? No   14. Do you have sleep apnea, excessive snoring or daytime drowsiness? No   15. Do you have any artifical heart valves or other implanted medical devices like a pacemaker, defibrillator, or continuous glucose monitor? No   16. Do you have artificial joints? No   17. Are you allergic to latex? No   18. Is there any chance that you may be pregnant? No     Health Care Directive:  Patient does not have a Health Care Directive or Living Will: Discussed advance care planning with patient; information given to patient to review.    Preoperative Review of :   reviewed - controlled substances reflected in medication list.        Review of Systems  CONSTITUTIONAL: NEGATIVE for fever, chills, change in weight  INTEGUMENTARY/SKIN: NEGATIVE for worrisome rashes, moles or lesions  EYES: NEGATIVE for vision changes or irritation  ENT/MOUTH: NEGATIVE for ear, mouth and throat problems  RESP: NEGATIVE for significant cough or SOB  CV: NEGATIVE for chest pain, palpitations or peripheral edema  GI: NEGATIVE for nausea, abdominal pain, heartburn, or change in bowel habits  : NEGATIVE for frequency, dysuria, or hematuria  MUSCULOSKELETAL: NEGATIVE for significant arthralgias or myalgia  NEURO: NEGATIVE for weakness, dizziness or paresthesias  HEME: NEGATIVE for bleeding problems    Patient Active Problem List    Diagnosis Date Noted     Change in bowel movement 11/22/2021     Priority: Medium     Nausea 11/22/2021     Priority: Medium     Rectal pain 11/22/2021     Priority: Medium     Class 3 severe obesity due to excess calories with serious comorbidity and body mass index (BMI) of 40.0 to 44.9 in adult (H) 08/31/2020     Priority:  Medium     Claudication of both lower extremities (H) 03/06/2020     Priority: Medium     PAD (peripheral artery disease) (H) 03/06/2020     Priority: Medium     Jaw claudication 03/06/2020     Priority: Medium     Added automatically from request for surgery [id number]       Anorectal disorder 08/23/2019     Priority: Medium     HTN, goal below 140/90 01/12/2019     Priority: Medium     Digestive symptom 09/18/2018     Priority: Medium     Vocal cord polyp 06/28/2018     Priority: Medium     Status post cervical spinal fusion 05/01/2018     Priority: Medium     Steatosis of liver 10/09/2017     Priority: Medium     Epigastric pain 09/26/2017     Priority: Medium     Diaphragmatic hernia 09/22/2017     Priority: Medium     DIAZ (nonalcoholic steatohepatitis) 06/17/2016     Priority: Medium     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  Doesn't drink, but relapsed (doesn't remember).  Was on Valium for 4 years (until October 2017). Xanax since 11/2017.  Managed by hospital.        Past Medical History:   Diagnosis Date     Anxiety state, unspecified      Arthritis      Asthma      Chronic pain     back pain from cyst     Contact dermatitis and other eczema, due to unspecified cause      COPD (chronic obstructive pulmonary disease) (H)      Depressive disorder      Depressive disorder, not elsewhere classified      Diabetes (H)      Emphysema with chronic bronchitis (H)      Esophageal reflux       "Family history of ischemic heart disease      Fibromyalgia      Gastro-oesophageal reflux disease      Heart disease     has chest pain sometimes     History of emphysema      Hoarseness      HTN, goal below 140/90      Hyperlipidemia LDL goal <130      Liver disease     \"fatty liver\"     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)      PONV (postoperative nausea and vomiting)      Rectal bleeding      Thyroid disease      Past Surgical History:   Procedure Laterality Date     ABDOMEN SURGERY       ANESTHESIA OUT OF OR MRI N/A 10/07/2021    Procedure: ANESTHESIA OUT OF OR MRI;  Surgeon: GENERIC ANESTHESIA PROVIDER;  Location: [facility] OR     ARTHROSCOPY SHOULDER DECOMPRESSION Left 10/21/2015    Procedure: ARTHROSCOPY SHOULDER DECOMPRESSION;  Surgeon: Julien Milian MD;  Location: [facility] OR     BIOPSY ARTERY TEMPORAL Left 03/11/2020    Procedure: LEFT TEMPORAL ARTERY BIOPSY;  Surgeon: Ibeth Conner MD;  Location: [facility]     BRONCHIAL THERMOPLASTY N/A 11/14/2014    Procedure: BRONCHIAL THERMOPLASTY;  Surgeon: Ward Whitaker MD;  Location: [facility]     BRONCHIAL THERMOPLASTY N/A 12/19/2014    Procedure: BRONCHIAL THERMOPLASTY;  Surgeon: Ward Whitaker MD;  [facility]: [facility]     BRONCHIAL THERMOPLASTY N/A 02/06/2015    Procedure: BRONCHIAL THERMOPLASTY;  Surgeon: Ward Whitaker MD;  Location: [facility]     COLONOSCOPY N/A 11/26/2018    Procedure: COLONOSCOPY ([facility]);  Surgeon: Marshall Oakes MD;  Location: [facility] OR     COLONOSCOPY N/A 10/22/2020    Procedure: Colonoscopy;  Surgeon: Marshall Mccormick MD;  Location:  OR     COLONOSCOPY N/A 1/20/2023    Procedure: COLONOSCOPY, FLEXIBLE, WITH LESION REMOVAL USING SNARE;  Surgeon: Carson Domínguez MD;  Location:  GI     DISCECTOMY, FUSION CERVICAL ANTERIOR ONE LEVEL, COMBINED N/A 05/01/2018    Procedure: COMBINED DISCECTOMY, FUSION CERVICAL ANTERIOR ONE LEVEL;  1.  C5-C6 anterior cervical diskectomy and fusion.    2.  C5-C6 application of " intervertebral biomechanical device for interbody fusion purposes.    3.  C5-C6 anterior instrumentation using the standard Kristin InViZia 24 mm plate with four associated bone screws, 12 mm in length.  Inferior screws are fixed.  Superior screws are va     ENT SURGERY  2015    polyps removed from vocal cords      ESOPHAGOSCOPY, GASTROSCOPY, DUODENOSCOPY (EGD), COMBINED N/A 10/22/2020    Procedure: Esophagoscopy, gastroscopy, duodenoscopy with biopsies;  Surgeon: Marshall Mccormick MD;  Location:  OR     ESOPHAGOSCOPY, GASTROSCOPY, DUODENOSCOPY (EGD), COMBINED N/A 06/17/2021    Procedure: ESOPHAGOGASTRODUODENOSCOPY, WITH BIOPSY;  Surgeon: Darrel Damon MD;  Location:  GI     EXCISE MASS TRUNK Left 11/03/2021    Procedure: EXCISION LEFT SHOULDER LIPOMA;  Surgeon: Ibeth Conner MD;  Location:  OR     EXCISE NODE MEDIASTINAL  04/26/2013    Procedure: EXCISE NODE MEDIASTINAL;;  Surgeon: Av Peña MD;  Location:  OR     HEAD & NECK SURGERY  2018    Had my five and six fused in my neck     LAPAROSCOPIC CHOLECYSTECTOMY N/A 10/19/2017    Procedure: LAPAROSCOPIC CHOLECYSTECTOMY;  LAPAROSCOPIC CHOLECYSTECTOMY;  Surgeon: Ney Jerry MD;  Location:  OR     THORACOSCOPY  04/26/2013    Procedure: THORACOSCOPY;  LEFT VIDEO ASSISTED THORACOSCOPY, RESECTION OF POSTERIOR MEDIASTINAL MASS;  Surgeon: Av Peña MD;  Location:  OR     TONSILLECTOMY  as a kid     TONSILLECTOMY       ZZC APPENDECTOMY  at age 17     Current Outpatient Medications   Medication Sig Dispense Refill     albuterol (PROVENTIL) (2.5 MG/3ML) 0.083% neb solution INHALE ONE VIAL BY NEBULIZER EVERY 6 HOURS AS NEEDED FOR SHORTNESS OF BREATH OR WHEEZING 75 mL 3     albuterol (VENTOLIN HFA) 108 (90 Base) MCG/ACT inhaler Inhale 2 puffs into the lungs every 6 hours 18 g 0     amLODIPine (NORVASC) 2.5 MG tablet Take 2 tablets (5 mg) by mouth daily 180 tablet 0     atorvastatin (LIPITOR) 80 MG tablet TAKE ONE TABLET  BY MOUTH EVERY DAY 90 tablet 3     benzoyl peroxide (ACNE-CLEAR) 10 % external gel Apply topically 2 times daily as needed 90 g 1     blood glucose (ACCU-CHEK ALLYSON PLUS) test strip USE TO TEST BLOOD SUGAR ONCE DAILY OR AS DIRECTED 100 strip 3     blood glucose (NO BRAND SPECIFIED) lancets standard Use to test blood sugar 1 times daily or as directed. 100 each 3     blood glucose (NO BRAND SPECIFIED) test strip Use to test blood sugar 1 times daily or as directed.  Dispense Accu-chek Allyson Plus or per patient insurance 100 strip 3     blood glucose monitoring (NO BRAND SPECIFIED) meter device kit Use to test blood sugar 1 times daily or as directed.  Dispense Accu-chek Allyson Plus or per insurance preference 1 kit 0     blood glucose monitoring (NO BRAND SPECIFIED) meter device kit Use to test blood sugar 1 times daily or as directed. 1 kit 0     budesonide-formoterol (SYMBICORT) 160-4.5 MCG/ACT Inhaler Inhale 2 puffs into the lungs 2 times daily 30.6 g 3     buPROPion (WELLBUTRIN XL) 150 MG 24 hr tablet TAKE ONE TABLET BY MOUTH EVERY MORNING 90 tablet 1     cetirizine (ZYRTEC) 10 MG tablet TAKE ONE TABLET BY MOUTH EVERY DAY AS NEEDED 90 tablet 3     clindamycin (CLINDAMAX) 1 % external gel Apply topically 2 times daily 60 g 3     clonazePAM (KLONOPIN) 0.5 MG tablet Take 0.5 mg by mouth daily as needed        clonazePAM (KLONOPIN) 1 MG tablet Take 1 tablet (1 mg) by mouth 2 times daily as needed for anxiety She needs to see her PCP to get more tablets 5 tablet 0     Emollient (CERAVE MOISTURIZING) CREA Externally apply 1 Application topically 2 times daily 453 g 11     fluticasone (FLONASE) 50 MCG/ACT nasal spray Spray 2 sprays in nostril daily (Patient taking differently: Spray 2 sprays in nostril daily as needed) 16 g 5     furosemide (LASIX) 20 MG tablet TAKE ONE TABLET BY MOUTH TWICE A  tablet 3     hydrocortisone 2.5 % cream APPLY TO AFFECTED AREA(S) TWO TIMES A DAY 28.35 g 1     hydrOXYzine (ATARAX) 50  MG tablet TAKE TWO TABLETS BY MOUTH THREE TIMES A DAY AS NEEDED FOR ANXIETY 70 tablet 9     lamoTRIgine (LAMICTAL) 150 MG tablet Take 150 mg by mouth daily       levofloxacin (LEVAQUIN) 500 MG tablet Take 1 tablet (500 mg) by mouth daily 7 tablet 0     levothyroxine (SYNTHROID/LEVOTHROID) 50 MCG tablet Take 1 tablet (50 mcg) by mouth daily 90 tablet 1     lisinopril (ZESTRIL) 20 MG tablet TAKE ONE TABLET BY MOUTH EVERY DAY 90 tablet 0     metFORMIN (GLUCOPHAGE) 500 MG tablet TAKE ONE TABLET BY MOUTH EVERY DAY WITH BREAKFAST 90 tablet 0     minoxidil (ROGAINE) 5 % external solution apply per package directions to the scalp once daily 120 mL 3     montelukast (SINGULAIR) 10 MG tablet TAKE ONE TABLET BY MOUTH AT BEDTIME 90 tablet 1     multivitamin, therapeutic (THERA-VIT) TABS tablet Take 1 tablet by mouth daily       naltrexone (DEPADE/REVIA) 50 MG tablet Take 1/2 tablet once daily 1-2 hours prior to worst cravings for 1 week, then increase to 1 tablet daily as directed if tolerating 30 tablet 3     nitroGLYcerin (NITROSTAT) 0.4 MG sublingual tablet PLACE ONE TABLET UNDER THE TONGUE AT THE 1ST SIGN OF ATTACK. IF PAIN IS UNRELIEVED OR WORSENED 5 MINUTES AFTER 1ST DOSE, PROMPT MEDICAL ASSISTANCE IS NEEDED. MAY REPEAT EVERY 5 MINUTES UNTIL PAIN IS RELIEVED. MAX OF THREE DOSES 25 tablet 11     nystatin (MYCOSTATIN) 256149 UNIT/GM external powder APPLY TOPICALLY TWICE A DAY AS NEEDED SKIN RASH 45 g 9     omeprazole (PRILOSEC) 20 MG DR capsule TAKE ONE CAPSULE BY MOUTH TWICE A  capsule 1     ondansetron (ZOFRAN-ODT) 4 MG ODT tab Take 1 tablet (4 mg) by mouth every 6 hours as needed for nausea 12 tablet 1     permethrin (LICE TREATMENT CREME RINSE) 1 % external liquid Apply topically from the neck down, leave on overnight (approx. 8 hours) and repeat 1 weeks later. 120 mL 1     polyethylene glycol (MIRALAX) 17 GM/Dose powder DISSOLVE 17 GRAMS (1 CAPFUL) AND DRINK BY MOUTH ONCE DAILY 510 g 1     predniSONE (DELTASONE)  20 MG tablet Take 2 tablets (40 mg) by mouth daily 10 tablet 0     RYBELSUS 14 MG TABS TAKE ONE TABLET BY MOUTH EVERY DAY 30 tablet 3     Semaglutide 7 MG TABS Take 7 mg by mouth daily 30 tablet 3     sucralfate (CARAFATE) 1 GM tablet Take 1 tablet (1 g) by mouth 4 times daily as needed for nausea 120 tablet 1     sulfamethoxazole-trimethoprim (BACTRIM DS) 800-160 MG tablet Take 1 tablet by mouth 2 times daily Take one tablet twice daily. For additional refills, please schedule a follow-up appointment. 180 tablet 1     terbinafine (LAMISIL) 1 % external cream APPLY TO AFFECTED AREA(S) TWO TIMES A DAY 30 g 2     triamcinolone (KENALOG) 0.025 % external ointment Apply topically 2 times daily 80 g 1     Vitamin D3 (CHOLECALCIFEROL) 125 MCG (5000 UT) tablet Take 1 tablet by mouth three times a week         Allergies   Allergen Reactions     Codeine Nausea and Vomiting     vomiting     Cyclobenzaprine Nausea     Gabapentin Rash     Hydrocodone Nausea and Vomiting     Sulfa Drugs Nausea and Vomiting     Nausea        Social History     Tobacco Use     Smoking status: Former     Types: Other     Smokeless tobacco: Former     Quit date: 2/13/2022     Tobacco comments:     Quit smoking prior to bariatric surgery and because of my COPD   Substance Use Topics     Alcohol use: Not Currently     Family History   Problem Relation Age of Onset     Heart Disease Mother         murmur, mi     Hypertension Mother      Cerebrovascular Disease Mother      Depression Mother      Gallbladder Disease Mother      Asthma Mother      Thyroid Disease Mother         And my mother had it also     Obesity Mother         And myself     Family History Negative Father         does not know  him     Family History Negative Brother      Heart Disease Maternal Grandfather      Asthma Maternal Grandfather      Hypertension Maternal Grandfather      Cerebrovascular Disease Maternal Grandfather      Diabetes Maternal Grandfather      Asthma Sister       "Hypertension Sister      Cerebrovascular Disease Sister      Hyperlipidemia Sister      Anxiety Disorder Sister      Hypertension Maternal Grandmother      Cerebrovascular Disease Maternal Grandmother      Hypertension Other         Rukhsana is my sister Jigna is my mom     History   Drug Use No         Objective     /69 (BP Location: Right arm, Patient Position: Sitting, Cuff Size: Adult Large)   Pulse 74   Temp 98  F (36.7  C) (Oral)   Resp 20   Ht 1.6 m (5' 3\")   Wt 95.9 kg (211 lb 8 oz)   LMP 04/15/2016 (Exact Date)   SpO2 96%   BMI 37.47 kg/m      Physical Exam      GENERAL APPEARANCE:in NAD     EYES: no scleral icterus     HENT: OP clear mouth without ulcers or lesions     NECK: supple, no bruits     RESP: lungs clear to auscultation - no rales, rhonchi or wheezes     CV: regular rates and rhythm, normal S1 S2, no S3 or S4 and no murmur, click or rub     ABDOMEN:  soft, nontender, no HSM or masses and bowel sounds normal     MS: extremities normal- no gross deformities noted, no edema     PSYCH: mentation appears normal. and affect normal/bright    Recent Labs   Lab Test 12/23/22  1937 08/17/22  0929 01/24/22  1403   HGB 11.7 13.1 13.9    339 379    139 135   POTASSIUM 4.2 4.0 4.0   CR 0.96* 0.80 0.85   A1C  --  5.5 5.8*        Diagnostics:  No labs were ordered during this visit.   No EKG this visit, completed in the last 90 days.    Revised Cardiac Risk Index (RCRI):  The patient has the following serious cardiovascular risks for perioperative complications:   - No serious cardiac risks = 0 points     RCRI Interpretation: 0 points: Class I (very low risk - 0.4% complication rate)           Signed Electronically by: Lore Francis PA-C  Copy of this evaluation report is provided to requesting physician.      "

## 2023-01-27 NOTE — PATIENT INSTRUCTIONS
Stop naltrexone 3 days prior to surgery, don't start until after you have been off all opioids for two weeks     Don't take Rybelsus 24 hours prior to surgery    Don't take vitamins/supplements, lasix, metformin, klonopin, hydroxyzine or lisinopril the am of surgery (okay to take after surgery when eating/drinking)    Take amlodipine, lamotrigine, omeprazole, bactrim, wellbutrin and levothyroxine with a sip of water the am of surgery

## 2023-01-27 NOTE — TELEPHONE ENCOUNTER
Patient is calling to get another rx for miralax. She said she used the two bottles for her recent colonoscopy prep and now she only has one capful left. She says she didn't get an rx from GI for the prep. Patient states she only uses one capful per day. Now her insurance is saying it is too soon to fill.

## 2023-01-30 RX ORDER — CETIRIZINE HYDROCHLORIDE 10 MG/1
TABLET ORAL
Qty: 90 TABLET | Refills: 0 | Status: SHIPPED | OUTPATIENT
Start: 2023-01-30 | End: 2023-05-04

## 2023-01-30 RX ORDER — POLYETHYLENE GLYCOL 3350 17 G/17G
POWDER, FOR SOLUTION ORAL
Qty: 510 G | Refills: 1 | Status: SHIPPED | OUTPATIENT
Start: 2023-01-30 | End: 2024-08-15

## 2023-01-31 ENCOUNTER — VIRTUAL VISIT (OUTPATIENT)
Dept: ENDOCRINOLOGY | Facility: CLINIC | Age: 53
End: 2023-01-31
Payer: COMMERCIAL

## 2023-01-31 DIAGNOSIS — K75.81 NASH (NONALCOHOLIC STEATOHEPATITIS): ICD-10-CM

## 2023-01-31 DIAGNOSIS — Z71.3 NUTRITIONAL COUNSELING: Primary | ICD-10-CM

## 2023-01-31 DIAGNOSIS — K59.00 CONSTIPATION: ICD-10-CM

## 2023-01-31 DIAGNOSIS — E66.812 OBESITY, CLASS II, BMI 35-39.9: ICD-10-CM

## 2023-01-31 PROCEDURE — 97803 MED NUTRITION INDIV SUBSEQ: CPT | Mod: 95 | Performed by: DIETITIAN, REGISTERED

## 2023-01-31 NOTE — Clinical Note
Please mail pt her AVS - no mychart.   She does NOT need the links printed out (should already have them).  Thanks!

## 2023-01-31 NOTE — PROGRESS NOTES
"Swetha Patterson is a 52 year old female who is being evaluated via a billable telephone visit.     The patient has been notified of following:     \"This telephone visit will be conducted via a call between you and your physician/provider. We have found that certain health care needs can be provided without the need for a physical exam.  This service lets us provide the care you need with a short phone conversation.  If a prescription is necessary we can send it directly to your pharmacy.  If lab work is needed we can place an order for that and you can then stop by our lab to have the test done at a later time.    Telephone visits are billed at different rates depending on your insurance coverage. During this emergency period, for some insurers they may be billed the same as an in-person visit.  Please reach out to your insurance provider with any questions.    If during the course of the call the physician/provider feels a telephone visit is not appropriate, you will not be charged for this service.\"    Patient has given verbal consent for Telephone visit?  Yes    Phone call duration: 33 minutes    During this virtual visit the patient is located in [place], patient verifies this as the location during the entirety of this visit.       Bariatric Nutrition Consultation Note    Reason For Visit: Nutrition Assessment    Swetha Patterson is a 52 year old presenting today for return bariatric nutrition consult.   Pt is interested in laparoscopic sleeve gastrectomy.    Pt has met the minimum required RD appointments but does need formal post op education once she has a surgery date.     Pt referred by NOE Blake on January 20, 2022.    Coordination note:  Needs letter of support from therapist and psychiatrist  - Started seeing Mercedes Whitfield 9/29. Most recently working with Rogers Eller   Needs clearance from sleep medicine - In progress, had to reschedule sleep study numerous times now due to being sick  Needs PCP " "letter of support - Done per pt.   Surgeon meet and greet with Dr. Leach - Done 6/9/22   No nicotine for 3 months prior to surgery - Done     Needs Psych eval  - On file 7/6/22 from Dr. Hartman  Sobriety from alcohol for 1 year - May 2022 - Met  Needs baseline labs - Done 1/24/22  10 lb weight loss from initial - Met    Patient with Co-morbidities of obesity including:  Type II DM no but does have pre-diabetes, last A1C 5.8 on 1/24/22  Renal Failure no  Sleep apnea yes  Hypertension yes  Dyslipidemia yes  Joint pain no  Back pain no  GERD no     PMH: fatty liver (nonalcoholic per pt), fibromyalgia, cholecystomy, asthma    Sober since May 2021  Hx of nicotine abuse    Support System Reviewed With Patient 1/19/2022   Who do you have in your support network that can be available to help you for the first 2 weeks after surgery? I have my sister Rukhsana a very close friend of keri HaynesKanwalmy and I have my dad and I also have psychiatrist my sister Rukhsana Haynesgurwinder Kearns my dad and my psychiatrist again Rukhsana Milian   Who can you count on for support throughout your weight loss surgery journey? Rukhsana Kearns my dad my psychiatrist       ANTHROPOMETRICS:  Consult weight 1/20/22: 247 lbs with BMI 43.75    Estimated body mass index is 37.47 kg/m  as calculated from the following:    Height as of 1/27/23: 1.6 m (5' 3\").    Weight as of 1/27/23: 95.9 kg (211 lb 8 oz).     Current weight: ~200-203 lbs, pt report based on home scales. Stable right now.  Of note: 211 per chart review 1/27/23 (in person weight with clothes on, etc)    Required weight loss goal pre-op: 10 lbs from initial consult weight (goal weight 237 lbs or less before surgery)       1/19/2022   I have tried the following methods to lose weight Watching portions or calories, Exercise, Physician directed program       Weight Loss Questions Reviewed With Patient 1/19/2022   How long have you been overweight? From Middle age and beyond     MEDICATIONS FOR " WEIGHT LOSS:  Semaglutide  Naltrexone      Also takes Wellbutrin    SUPPLEMENT INFORMATION:  Vit D 5000 IUS/ MWF  Got a MVI with iron (non-chewable)     Vit D WNL 1/24/22, hx of vit d def    Prefers oral B12 after surgery - does not like needles    NUTRITION HISTORY:  NKFA  Possible lactose intolerance     Pt s friend had sleeve surgery - is familiar with some of it through her. Went well for friend.    Nicotine: Smoked for ~40+ years per pt. Quit cigarettes two weeks ago, using patch and gum. Refer to [facility]. None for 3 months prior to surgery    Alcohol: hx of abuse. Sober since May 2021. No surgery until May 2022    Please see previous RD note for more detail.    Sept 2022:  Pt saw PCP last month for concerns regarding symptoms likely related to her hernia.   Checked labs - TSH was high, started medication.      Doing well with dietary goals overall per pt but struggling with giving up pop (having headaches).      Intake unclear.   Likely not getting enough protein - craving meat.      Chewing improving - got teeth yesterday. Had partials before.      PA: walking lots, limited. still having pain with hernia, can t bend over fully.     Oct 2022:  Got sick last month following flu/covid vaccines - vomiting. Also got a UTI and a respiratory infection. Then fell and hurt her thumb. Was on steroids, gained some weight and was eating more.    Doing lots of soup. Adding frozen vegetables.  Or having 1/4 egg salad sandwich.   If having a burger will eat 1/4 of it.  Snacking on grapes, celery and pb, cottage cheese and yogurt.  Doing Ensure occ to help supplement protein intake.     Turned off by pork - found a worm in her pork last month.     Drinking lots of water. No longer drinking pop. Continues to be alcohol free.  fluids from meal time and for 15 minutes post meals.    Nervous about nicotine test - she is nicotine free but her father is smoking 1-2 packs/day in house. Trying to avoid second hand smoke.      PA: walking daily    Nov 2022:  No showed appt 11/11/22 - was sick and her cat was sick.  Had covid. Feeling better today.  Lost appetite and taste. Food still taste different per pt.   Doing lots of vegetables and homemade soups since they are already pretty bland. Also doing low-sugar/low fat yogurt. Snacking on something light at night time such as a small piece of fruit.    Continues to have an aversion to meat.   Discussed protein options post op on liquid diets.  Has ensure regularly right now - aware ensure max protein is needed post op.     Doing well staying hydrated. Found some low-sugar non sparkling flavored beverages.   Did share she misses the crack sound/fizz of opening a pop can.     Recently started meeting with Jefferson Eller, therapist. Going well.     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 Used to be tempted by food she made him but now feels okay not eating.     Had some bread on Thanksgiving but otherwise not having.  Also had some turkey, creamed corn, and a little bit of green bean casserole.   Wanted pumpkin pie but didn't want her sugars to spike so avoided.     PA: walking daily (shorter since it's getting cold but will go walk at store sometimes or up/down stairs in apartment instead)    - Mobility improved. No longer needing her worker to do as much for her as of 3 months ago. She has been able to clean/sweep more herself. Still needs help with laundry.     - Bought a seated peddler and dumbbells for post surgery     Jan 2023:  Cancelled appt earlier this month due to possible non-coverage noted on RDs end when signing notes. Patient called her insurance and reports she was informed appts are covered. Will continue to meet.    Had not been feeling well and hadn't had a BM in a while so went to doctor. They recommended a colonoscopy.  Reports they removed cancerous polyps but she has 2 pockets that they cannot remove/pop at this time because it would lead to sepsis per her report. She is very  concerned about this and getting cancer. Going back in Monday to discuss more with GI per pt.    Also has a procedure tomorrow for lipoma. Having a mass on her neck removed.    Continues with Rogers Eller. Working on anxiety. Hard with recent medical findings.     Staying well hydrated.  Drinking water and sugar-free powerade.  Keeping protein shakes on hand for when appetite is low.     Trying to add more greens and fiber as recommended by medical team per pt.     Scared to eat meat right now. Plans to make a vegetable soup today.    Additional information:  Laura Timmons    Lives with and takes care of Dad    Recall Diet Questions Reviewed With Patient 1/19/2022   Describe what you typically consume for breakfast (typical or most recent): I usually don't eat much for breakfast maybe a piece of toast.   Describe what you typically consume for lunch (typical or most recent): Sometimes hot dogs or macaroni and cheese or hamburgers or fast food or eggs or just about anything   Describe what you typically consume for supper (typical or most recent): Always have to have meat with my dinner potato vegetable if it's corn or green beans but I prefer peas and then I snack at night time before bed because I'm so hungry   Describe what you typically consume as snacks (typical or most recent): Cereal bars chips sometimes fruit a hamburger fast food   How many ounces of water, or other low calorie drinks, do you drink daily (8 oz=1 glass)? 48 oz   How many ounces of caffeine (coffee, tea, pop) do you drink daily (8 oz=1 glass)? 8 oz   How many ounces of carbonated (pop, beer, sparkling water) drinks do you drinky daily (8 oz=1 glass)? 8 oz   How many ounces of juice, pop, sweet tea, sports drinks, protein drinks, other sweetened drinks, do you drink daily (8 oz=1 glass)? 24 oz   How many ounces of milk do you drink daily (8 oz=1 glass) 8 oz   Please indicate the type of milk: 2%   How often do you drink alcohol? Never   If you  do drink alcohol, how many drinks might you have in a day? (one drink = 5 oz. wine, 1 can/bottle of beer, 1 shot liquor) -       Eating Habits 1/19/2022   Do you have any dietary restrictions? Yes   Do you currently binge eat (eat a large amount of food in a short time)? -   Are you an emotional eater? No   Do you get up to eat after falling asleep? Yes   What foods do you crave? I crave Pizza macaroni and cheese [employer] hamburger french fries but I do still eat my vegetables     Dining Out History Reviewed With Patient 1/19/2022   How often do you dine out? A couple of times a week.   Where do you dine out? (select all that apply) fast food chains, take out   What types of food do you order when you dine out? Some type of meat potato and of course you got to have a green beans or corn       Physical Activity Reviewed With Patient 2/22/2022   How often do you exercise? Daily   What is the duration of your exercise (in minutes)? 30 Minutes   What types of exercise do you do? walking, other   What keeps you from being more active?  I am as active as I can possbily be       NUTRITION DIAGNOSIS:  Obesity r/t long history of positive energy balance aeb BMI >30 kg/m2.    INTERVENTION:  Intervention Provided/Education Provided on post-op diet guidelines, vitamins/minerals essential post-operatively, GI anatomy of bariatric surgeries, ways to help prepare for post-op diet guidelines pre-operatively, portion/calorie-control, mindful eating and sources of protein.  Patient demonstrates understanding. Provided pt with list of goals RD contact information.      Questions Reviewed With Patient 1/19/2022   How ready are you to make changes regarding your weight? Number 1 = Not ready at all to make changes up to 10 = very ready. 10   How confident are you that you can change? 1 = Not confident that you will be successful making changes up to 10 = very confident. 10       Expected Engagement: good    GOALS:   Keep up the great work!                - No alcohol              - No smoking              - Seperating fluids from meal time               - Sipping fluids              - Chewing/pace              - No carbonation after surgery   - Staying active (walking, peddler, dumb bells)      Will need 60 grams of protein/day minimum after surgery    Protein Shakes Examples:  Premier Protein   Slim Fast Advanced Nutrition   Muscle Milk, lactose-free  [facility] Core Power   Ensure Max Protein  Boost Max Protein  Orgain  Aldi s Elevation Protein Shake     Protein Powders:   Integrated Supplements, no artificial sugars   Genepro, unflavored protein powder   BiPro  Vital HP Collagen Peptides      Clear Liquid options:  Unflavored protein powders   Bone Broth   Protein Jello  Premier Protein clear (shake)  [id number] (beverage)   Gatorade/Powerade Zero with Protein     Non-meat Protein Ideas    Quinoa    Eggs    Nuts    Beans    Lentils    Protein pasta     Nutritional yeast    Garden of life raw meal powder    Liquid aminos    Homemade meats - a taco meat could be made with chopped cauliflower/mushroom as an example     [facility] hearts      Vegetarian Meal Blog  [email or web address]     High Fiber Foods:  Bran cereal, 1/3 cup, 8.6 gm fiber   Cooked kidney beans   cup 7.9 gm  Cooked lentils   cup 7.8 gm  Cooked black beans   cup 7.6 gm  Canned chickpeas   cup 5.3 gm  Baked beans   cup 5.2 gm  Pear 1 5.1 gm  Soybeans   cup 5.1 gm  Quinoa   cup 5 gm  Baked sweet potato, with skin 1 medium 4.8 gm  Baked potato, with skin 1 medium 4.4 gm  Cooked frozen green peas   cup 4.4 gm  Bulgur   cup 4.1 gm  Cooked frozen mixed vegetables   cup 4 gm  Raspberries   cup 4 gm  Blackberries   cup 3.8 gm  Almonds 1 oz 3.5 gm  Cooked frozen spinach   cup 3.5 gm  Vegetable or soy yony 1 each 3.4 gm  Apple 1 medium 3.3 gm  Dried dates 5 pieces 3.3 gm    Surgery resources:  Diet Guidelines after Weight Loss  Surgery  [email or web address]     Your Stage 1 Diet: Clear Liquids  [email or web address]     Your Stage 2 Diet: Low-fat Full Liquids  [email or web address]     Your Stage 3 Diet: Pureed Foods  [email or web address]     Pureed Recipes  [email or web address]    Your Stage 4 Diet: Soft Foods  [email or web address]    Your Stage 5 Diet: Regular Foods  [email or web address]    Follow up:   Thursday, March 2nd at 11:00 am    Time spent with patient: 33 minutes.  SHELLY Neal, RD, LD

## 2023-01-31 NOTE — Clinical Note
Saw pt today. Recent concerning colonoscopy per pt. She went in because she was having issues with constipation and lots of nausea. I haven't looked into it yet but she wanted to check with you/Dr. Leach to see if surgery would be impacted. It sounds like cancerous polyps and diverticulitis based on what she said. Doing well with surgery goals overall but appetite and eating impacted by medical status/stress.  No MyChart as FYI - she got hacked so had to delete it.   Thanks!

## 2023-01-31 NOTE — LETTER
"1/31/2023       RE: Swetha Patterson  [address] [place] [id number]     Dear Colleague,    Thank you for referring your patient, Swetha Patterson, to the [facility] [place] at [facility]. Please see a copy of my visit note below.    Swetha Patterson is a 52 year old female who is being evaluated via a billable telephone visit.     The patient has been notified of following:     \"This telephone visit will be conducted via a call between you and your physician/provider. We have found that certain health care needs can be provided without the need for a physical exam.  This service lets us provide the care you need with a short phone conversation.  If a prescription is necessary we can send it directly to your pharmacy.  If lab work is needed we can place an order for that and you can then stop by our lab to have the test done at a later time.    Telephone visits are billed at different rates depending on your insurance coverage. During this emergency period, for some insurers they may be billed the same as an in-person visit.  Please reach out to your insurance provider with any questions.    If during the course of the call the physician/provider feels a telephone visit is not appropriate, you will not be charged for this service.\"    Patient has given verbal consent for Telephone visit?  Yes    Phone call duration: 33 minutes    During this virtual visit the patient is located in [place], patient verifies this as the location during the entirety of this visit.       Bariatric Nutrition Consultation Note    Reason For Visit: Nutrition Assessment    Swetha Patterson is a 52 year old presenting today for return bariatric nutrition consult.   Pt is interested in laparoscopic sleeve gastrectomy.    Pt has met the minimum required RD appointments but does need formal post op education once she has a surgery date.     Pt referred by Rashmi " "NOE Del Rio on January 20, 2022.    Coordination note:  Needs letter of support from therapist and psychiatrist  - Started seeing Mercedes Whitfield 9/29. Most recently working with Rogers Eller   Needs clearance from sleep medicine - In progress, had to reschedule sleep study numerous times now due to being sick  Needs PCP letter of support - Done per pt.   Surgeon meet and greet with Dr. Leach - Done 6/9/22   No nicotine for 3 months prior to surgery - Done     Needs Psych eval  - On file 7/6/22 from Dr. Hartman  Sobriety from alcohol for 1 year - May 2022 - Met  Needs baseline labs - Done 1/24/22  10 lb weight loss from initial - Met    Patient with Co-morbidities of obesity including:  Type II DM no but does have pre-diabetes, last A1C 5.8 on 1/24/22  Renal Failure no  Sleep apnea yes  Hypertension yes  Dyslipidemia yes  Joint pain no  Back pain no  GERD no     PMH: fatty liver (nonalcoholic per pt), fibromyalgia, cholecystomy, asthma    Sober since May 2021  Hx of nicotine abuse    Support System Reviewed With Patient 1/19/2022   Who do you have in your support network that can be available to help you for the first 2 weeks after surgery? I have my sister Rukhsana a very close friend of keri Mckinley and I have my dad and I also have psychiatrist my sister Rukhsana Kearns my dad and my psychiatrist again Rukhsana Milian   Who can you count on for support throughout your weight loss surgery journey? Rukhsana Kearns my dad my psychiatrist       ANTHROPOMETRICS:  Consult weight 1/20/22: 247 lbs with BMI 43.75    Estimated body mass index is 37.47 kg/m  as calculated from the following:    Height as of 1/27/23: 1.6 m (5' 3\").    Weight as of 1/27/23: 95.9 kg (211 lb 8 oz).     Current weight: ~200-203 lbs, pt report based on home scales. Stable right now.  Of note: 211 per chart review 1/27/23 (in person weight with clothes on, etc)    Required weight loss goal pre-op: 10 lbs from initial consult weight " (goal weight 237 lbs or less before surgery)       1/19/2022   I have tried the following methods to lose weight Watching portions or calories, Exercise, Physician directed program       Weight Loss Questions Reviewed With Patient 1/19/2022   How long have you been overweight? From Middle age and beyond     MEDICATIONS FOR WEIGHT LOSS:  Semaglutide  Naltrexone      Also takes Wellbutrin    SUPPLEMENT INFORMATION:  Vit D 5000 IUS/ MWF  Got a MVI with iron (non-chewable)     Vit D WNL 1/24/22, hx of vit d def    Prefers oral B12 after surgery - does not like needles    NUTRITION HISTORY:  NKFA  Possible lactose intolerance     Pt s friend had sleeve surgery - is familiar with some of it through her. Went well for friend.    Nicotine: Smoked for ~40+ years per pt. Quit cigarettes two weeks ago, using patch and gum. Refer to MTM. None for 3 months prior to surgery    Alcohol: hx of abuse. Sober since May 2021. No surgery until May 2022    Please see previous RD note for more detail.    Sept 2022:  Pt saw PCP last month for concerns regarding symptoms likely related to her hernia.   Checked labs - TSH was high, started medication.      Doing well with dietary goals overall per pt but struggling with giving up pop (having headaches).      Intake unclear.   Likely not getting enough protein - craving meat.      Chewing improving - got teeth yesterday. Had partials before.      PA: walking lots, limited. still having pain with hernia, can t bend over fully.     Oct 2022:  Got sick last month following flu/covid vaccines - vomiting. Also got a UTI and a respiratory infection. Then fell and hurt her thumb. Was on steroids, gained some weight and was eating more.    Doing lots of soup. Adding frozen vegetables.  Or having 1/4 egg salad sandwich.   If having a burger will eat 1/4 of it.  Snacking on grapes, celery and pb, cottage cheese and yogurt.  Doing Ensure occ to help supplement protein intake.     Turned off by pork -  found a worm in her pork last month.     Drinking lots of water. No longer drinking pop. Continues to be alcohol free.  fluids from meal time and for 15 minutes post meals.    Nervous about nicotine test - she is nicotine free but her father is smoking 1-2 packs/day in house. Trying to avoid second hand smoke.     PA: walking daily    Nov 2022:  No showed appt 11/11/22 - was sick and her cat was sick.  Had covid. Feeling better today.  Lost appetite and taste. Food still taste different per pt.   Doing lots of vegetables and homemade soups since they are already pretty bland. Also doing low-sugar/low fat yogurt. Snacking on something light at night time such as a small piece of fruit.    Continues to have an aversion to meat.   Discussed protein options post op on liquid diets.  Has ensure regularly right now - aware ensure max protein is needed post op.     Doing well staying hydrated. Found some low-sugar non sparkling flavored beverages.   Did share she misses the crack sound/fizz of opening a pop can.     Recently started meeting with Jefferson Eller, therapist. Going well.     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 Used to be tempted by food she made him but now feels okay not eating.     Had some bread on Thanksgiving but otherwise not having.  Also had some turkey, creamed corn, and a little bit of green bean casserole.   Wanted pumpkin pie but didn't want her sugars to spike so avoided.     PA: walking daily (shorter since it's getting cold but will go walk at store sometimes or up/down stairs in apartment instead)    - Mobility improved. No longer needing her worker to do as much for her as of 3 months ago. She has been able to clean/sweep more herself. Still needs help with laundry.     - Bought a seated peddler and dumbbells for post surgery     Jan 2023:  Cancelled appt earlier this month due to possible non-coverage noted on RDs end when signing notes. Patient called her insurance and reports she was  informed appts are covered. Will continue to meet.    Had not been feeling well and hadn't had a BM in a while so went to doctor. They recommended a colonoscopy.  Reports they removed cancerous polyps but she has 2 pockets that they cannot remove/pop at this time because it would lead to sepsis per her report. She is very concerned about this and getting cancer. Going back in Monday to discuss more with GI per pt.    Also has a procedure tomorrow for lipoma. Having a mass on her neck removed.    Continues with Rogers Eller. Working on anxiety. Hard with recent medical findings.     Staying well hydrated.  Drinking water and sugar-free powerade.  Keeping protein shakes on hand for when appetite is low.     Trying to add more greens and fiber as recommended by medical team per pt.     Scared to eat meat right now. Plans to make a vegetable soup today.    Additional information:  Laura Timmons    Lives with and takes care of Dad    Recall Diet Questions Reviewed With Patient 1/19/2022   Describe what you typically consume for breakfast (typical or most recent): I usually don't eat much for breakfast maybe a piece of toast.   Describe what you typically consume for lunch (typical or most recent): Sometimes hot dogs or macaroni and cheese or hamburgers or fast food or eggs or just about anything   Describe what you typically consume for supper (typical or most recent): Always have to have meat with my dinner potato vegetable if it's corn or green beans but I prefer peas and then I snack at night time before bed because I'm so hungry   Describe what you typically consume as snacks (typical or most recent): Cereal bars chips sometimes fruit a hamburger fast food   How many ounces of water, or other low calorie drinks, do you drink daily (8 oz=1 glass)? 48 oz   How many ounces of caffeine (coffee, tea, pop) do you drink daily (8 oz=1 glass)? 8 oz   How many ounces of carbonated (pop, beer, sparkling water) drinks do you  drinky daily (8 oz=1 glass)? 8 oz   How many ounces of juice, pop, sweet tea, sports drinks, protein drinks, other sweetened drinks, do you drink daily (8 oz=1 glass)? 24 oz   How many ounces of milk do you drink daily (8 oz=1 glass) 8 oz   Please indicate the type of milk: 2%   How often do you drink alcohol? Never   If you do drink alcohol, how many drinks might you have in a day? (one drink = 5 oz. wine, 1 can/bottle of beer, 1 shot liquor) -       Eating Habits 1/19/2022   Do you have any dietary restrictions? Yes   Do you currently binge eat (eat a large amount of food in a short time)? -   Are you an emotional eater? No   Do you get up to eat after falling asleep? Yes   What foods do you crave? I crave Pizza macaroni and cheese [employer]way hamburger french fries but I do still eat my vegetables     Dining Out History Reviewed With Patient 1/19/2022   How often do you dine out? A couple of times a week.   Where do you dine out? (select all that apply) fast food chains, take out   What types of food do you order when you dine out? Some type of meat potato and of course you got to have a green beans or corn       Physical Activity Reviewed With Patient 2/22/2022   How often do you exercise? Daily   What is the duration of your exercise (in minutes)? 30 Minutes   What types of exercise do you do? walking, other   What keeps you from being more active?  I am as active as I can possbily be       NUTRITION DIAGNOSIS:  Obesity r/t long history of positive energy balance aeb BMI >30 kg/m2.    INTERVENTION:  Intervention Provided/Education Provided on post-op diet guidelines, vitamins/minerals essential post-operatively, GI anatomy of bariatric surgeries, ways to help prepare for post-op diet guidelines pre-operatively, portion/calorie-control, mindful eating and sources of protein.  Patient demonstrates understanding. Provided pt with list of goals RD contact information.      Questions Reviewed With Patient 1/19/2022   How  ready are you to make changes regarding your weight? Number 1 = Not ready at all to make changes up to 10 = very ready. 10   How confident are you that you can change? 1 = Not confident that you will be successful making changes up to 10 = very confident. 10       Expected Engagement: good    GOALS:   Keep up the great work!               - No alcohol              - No smoking              - Seperating fluids from meal time               - Sipping fluids              - Chewing/pace              - No carbonation after surgery   - Staying active (walking, peddler, dumb bells)      Will need 60 grams of protein/day minimum after surgery    Protein Shakes Examples:  Premier Protein   Slim Fast Advanced Nutrition   Muscle Milk, lactose-free  Fairlife Core Power   Ensure Max Protein  Boost Max Protein  Orgain  Aldi s Elevation Protein Shake     Protein Powders:   Integrated Supplements, no artificial sugars   Genepro, unflavored protein powder   BiPro  Vital HP Collagen Peptides      Clear Liquid options:  Unflavored protein powders   Bone Broth   Protein Jello  Premier Protein clear (shake)  [id number] (beverage)   Gatorade/Powerade Zero with Protein     Non-meat Protein Ideas    Quinoa    Eggs    Nuts    Beans    Lentils    Protein pasta     Nutritional yeast    Garden of life raw meal powder    Liquid aminos    Homemade meats - a taco meat could be made with chopped cauliflower/mushroom as an example     Hemp hearts      Vegetarian Meal Blog  [email or web address]     High Fiber Foods:  Bran cereal, 1/3 cup, 8.6 gm fiber   Cooked kidney beans   cup 7.9 gm  Cooked lentils   cup 7.8 gm  Cooked black beans   cup 7.6 gm  Canned chickpeas   cup 5.3 gm  Baked beans   cup 5.2 gm  Pear 1 5.1 gm  Soybeans   cup 5.1 gm  Quinoa   cup 5 gm  Baked sweet potato, with skin 1 medium 4.8 gm  Baked potato, with skin 1 medium 4.4 gm  Cooked frozen green peas   cup 4.4 gm  Bulgur   cup 4.1 gm  Cooked  frozen mixed vegetables   cup 4 gm  Raspberries   cup 4 gm  Blackberries   cup 3.8 gm  Almonds 1 oz 3.5 gm  Cooked frozen spinach   cup 3.5 gm  Vegetable or soy yony 1 each 3.4 gm  Apple 1 medium 3.3 gm  Dried dates 5 pieces 3.3 gm    Surgery resources:  Diet Guidelines after Weight Loss Surgery  [email or web address]     Your Stage 1 Diet: Clear Liquids  [email or web address]     Your Stage 2 Diet: Low-fat Full Liquids  [email or web address]     Your Stage 3 Diet: Pureed Foods  [email or web address]     Pureed Recipes  [email or web address]    Your Stage 4 Diet: Soft Foods  [email or web address]    Your Stage 5 Diet: Regular Foods  [email or web address]    Follow up:   Thursday, March 2nd at 11:00 am    Time spent with patient: 33 minutes.  SHELLY Neal, RD, LD

## 2023-01-31 NOTE — PATIENT INSTRUCTIONS
GOALS:   Keep up the great work!               - No alcohol              - No smoking              - Seperating fluids from meal time               - Sipping fluids              - Chewing/pace              - No carbonation after surgery   - Staying active (walking, peddler, dumb bells)      Will need 60 grams of protein/day minimum after surgery    Protein Shakes Examples:  Premier Protein   Slim Fast Advanced Nutrition   Muscle Milk, lactose-free  Fairlife Core Power   Ensure Max Protein  Boost Max Protein  Orgain  Aldi s Elevation Protein Shake     Protein Powders:   Integrated Supplements, no artificial sugars   Genepro, unflavored protein powder   BiPro  Vital HP Collagen Peptides      Clear Liquid options:  Unflavored protein powders   Bone Broth   Protein Jello  Premier Protein clear (shake)  [id number] (beverage)   Gatorade/Powerade Zero with Protein     Non-meat Protein Ideas  Quinoa  Eggs  Nuts  Beans  Lentils  Protein pasta   Nutritional yeast  Garden of life raw meal powder  Liquid aminos  Homemade meats - a taco meat could be made with chopped cauliflower/mushroom as an example   Hemp hearts      Vegetarian Meal Blog  [email or web address]     High Fiber Foods:  Bran cereal, 1/3 cup, 8.6 gm fiber   Cooked kidney beans   cup 7.9 gm  Cooked lentils   cup 7.8 gm  Cooked black beans   cup 7.6 gm  Canned chickpeas   cup 5.3 gm  Baked beans   cup 5.2 gm  Pear 1 5.1 gm  Soybeans   cup 5.1 gm  Quinoa   cup 5 gm  Baked sweet potato, with skin 1 medium 4.8 gm  Baked potato, with skin 1 medium 4.4 gm  Cooked frozen green peas   cup 4.4 gm  Bulgur   cup 4.1 gm  Cooked frozen mixed vegetables   cup 4 gm  Raspberries   cup 4 gm  Blackberries   cup 3.8 gm  Almonds 1 oz 3.5 gm  Cooked frozen spinach   cup 3.5 gm  Vegetable or soy yony 1 each 3.4 gm  Apple 1 medium 3.3 gm  Dried dates 5 pieces 3.3 gm    Surgery resources:  Diet Guidelines after Weight Loss  Surgery  [email or web address]     Your Stage 1 Diet: Clear Liquids  [email or web address]     Your Stage 2 Diet: Low-fat Full Liquids  [email or web address]     Your Stage 3 Diet: Pureed Foods  [email or web address]     Pureed Recipes  [email or web address]    Your Stage 4 Diet: Soft Foods  [email or web address]    Your Stage 5 Diet: Regular Foods  [email or web address]    Follow up:   Thursday, March 2nd at 11:00 am    SHELLY Henning, RD, LD  Clinic #: [phone]

## 2023-02-01 ENCOUNTER — HOSPITAL ENCOUNTER (OUTPATIENT)
Facility: CLINIC | Age: 53
Discharge: HOME | End: 2023-02-01
Attending: SURGERY | Admitting: SURGERY
Payer: COMMERCIAL

## 2023-02-01 ENCOUNTER — APPOINTMENT (OUTPATIENT)
Dept: SURGERY | Facility: PHYSICIAN GROUP | Age: 53
End: 2023-02-01
Payer: COMMERCIAL

## 2023-02-01 ENCOUNTER — ANESTHESIA (OUTPATIENT)
Dept: SURGERY | Facility: CLINIC | Age: 53
End: 2023-02-01
Payer: COMMERCIAL

## 2023-02-01 ENCOUNTER — ANESTHESIA EVENT (OUTPATIENT)
Dept: SURGERY | Facility: CLINIC | Age: 53
End: 2023-02-01
Payer: COMMERCIAL

## 2023-02-01 VITALS
BODY MASS INDEX: 37.42 KG/M2 | DIASTOLIC BLOOD PRESSURE: 82 MMHG | HEIGHT: 63 IN | HEART RATE: 85 BPM | WEIGHT: 211.2 LBS | TEMPERATURE: 97.3 F | SYSTOLIC BLOOD PRESSURE: 137 MMHG | OXYGEN SATURATION: 95 % | RESPIRATION RATE: 12 BRPM

## 2023-02-01 DIAGNOSIS — G89.29 CHRONIC LEFT SHOULDER PAIN: Primary | ICD-10-CM

## 2023-02-01 DIAGNOSIS — M25.512 CHRONIC LEFT SHOULDER PAIN: Primary | ICD-10-CM

## 2023-02-01 DIAGNOSIS — J30.2 SEASONAL ALLERGIC RHINITIS, UNSPECIFIED TRIGGER: ICD-10-CM

## 2023-02-01 LAB
CREAT SERPL-MCNC: 1.08 MG/DL (ref 0.51–0.95)
GFR SERPL CREATININE-BSD FRML MDRD: 61 ML/MIN/1.73M2
GLUCOSE BLDC GLUCOMTR-MCNC: 110 MG/DL (ref 70–99)
GLUCOSE BLDC GLUCOMTR-MCNC: 116 MG/DL (ref 70–99)
POTASSIUM SERPL-SCNC: 4.8 MMOL/L (ref 3.4–5.3)

## 2023-02-01 PROCEDURE — 999N000141 HC STATISTIC PRE-PROCEDURE NURSING ASSESSMENT: Performed by: SURGERY

## 2023-02-01 PROCEDURE — 82962 GLUCOSE BLOOD TEST: CPT | Mod: 91

## 2023-02-01 PROCEDURE — 258N000003 HC RX IP 258 OP 636: Performed by: NURSE ANESTHETIST, CERTIFIED REGISTERED

## 2023-02-01 PROCEDURE — 250N000013 HC RX MED GY IP 250 OP 250 PS 637: Performed by: NURSE ANESTHETIST, CERTIFIED REGISTERED

## 2023-02-01 PROCEDURE — 36415 COLL VENOUS BLD VENIPUNCTURE: CPT | Performed by: ANESTHESIOLOGY

## 2023-02-01 PROCEDURE — 710N000009 HC RECOVERY PHASE 1, LEVEL 1, PER MIN: Performed by: SURGERY

## 2023-02-01 PROCEDURE — 710N000012 HC RECOVERY PHASE 2, PER MINUTE: Performed by: SURGERY

## 2023-02-01 PROCEDURE — 258N000003 HC RX IP 258 OP 636: Performed by: ANESTHESIOLOGY

## 2023-02-01 PROCEDURE — 250N000009 HC RX 250: Performed by: ANESTHESIOLOGY

## 2023-02-01 PROCEDURE — 360N000075 HC SURGERY LEVEL 2, PER MIN: Performed by: SURGERY

## 2023-02-01 PROCEDURE — 250N000009 HC RX 250: Performed by: NURSE ANESTHETIST, CERTIFIED REGISTERED

## 2023-02-01 PROCEDURE — 250N000013 HC RX MED GY IP 250 OP 250 PS 637: Performed by: ANESTHESIOLOGY

## 2023-02-01 PROCEDURE — 84132 ASSAY OF SERUM POTASSIUM: CPT | Performed by: ANESTHESIOLOGY

## 2023-02-01 PROCEDURE — 250N000011 HC RX IP 250 OP 636: Performed by: ANESTHESIOLOGY

## 2023-02-01 PROCEDURE — 250N000025 HC SEVOFLURANE, PER MIN: Performed by: SURGERY

## 2023-02-01 PROCEDURE — 250N000009 HC RX 250: Performed by: SURGERY

## 2023-02-01 PROCEDURE — 21555 EXC NECK LES SC < 3 CM: CPT | Performed by: SURGERY

## 2023-02-01 PROCEDURE — 250N000011 HC RX IP 250 OP 636: Performed by: NURSE ANESTHETIST, CERTIFIED REGISTERED

## 2023-02-01 PROCEDURE — 370N000017 HC ANESTHESIA TECHNICAL FEE, PER MIN: Performed by: SURGERY

## 2023-02-01 PROCEDURE — 82565 ASSAY OF CREATININE: CPT | Performed by: ANESTHESIOLOGY

## 2023-02-01 PROCEDURE — 250N000011 HC RX IP 250 OP 636: Performed by: SURGERY

## 2023-02-01 PROCEDURE — 272N000001 HC OR GENERAL SUPPLY STERILE: Performed by: SURGERY

## 2023-02-01 RX ORDER — SODIUM CHLORIDE, SODIUM LACTATE, POTASSIUM CHLORIDE, CALCIUM CHLORIDE 600; 310; 30; 20 MG/100ML; MG/100ML; MG/100ML; MG/100ML
INJECTION, SOLUTION INTRAVENOUS CONTINUOUS
Status: DISCONTINUED | OUTPATIENT
Start: 2023-02-01 | End: 2023-02-01 | Stop reason: HOSPADM

## 2023-02-01 RX ORDER — OXYCODONE HYDROCHLORIDE 5 MG/1
10 TABLET ORAL EVERY 4 HOURS PRN
Status: DISCONTINUED | OUTPATIENT
Start: 2023-02-01 | End: 2023-02-01 | Stop reason: HOSPADM

## 2023-02-01 RX ORDER — ONDANSETRON 4 MG/1
4 TABLET, ORALLY DISINTEGRATING ORAL EVERY 30 MIN PRN
Status: DISCONTINUED | OUTPATIENT
Start: 2023-02-01 | End: 2023-02-01 | Stop reason: HOSPADM

## 2023-02-01 RX ORDER — GLYCOPYRROLATE 0.2 MG/ML
INJECTION, SOLUTION INTRAMUSCULAR; INTRAVENOUS PRN
Status: DISCONTINUED | OUTPATIENT
Start: 2023-02-01 | End: 2023-02-01

## 2023-02-01 RX ORDER — OXYCODONE HYDROCHLORIDE 5 MG/1
5 TABLET ORAL EVERY 6 HOURS PRN
Qty: 8 TABLET | Refills: 0 | Status: SHIPPED | OUTPATIENT
Start: 2023-02-01 | End: 2023-02-02

## 2023-02-01 RX ORDER — BUPIVACAINE HYDROCHLORIDE 5 MG/ML
INJECTION, SOLUTION EPIDURAL; INTRACAUDAL PRN
Status: DISCONTINUED | OUTPATIENT
Start: 2023-02-01 | End: 2023-02-01 | Stop reason: HOSPADM

## 2023-02-01 RX ORDER — PROPOFOL 10 MG/ML
INJECTION, EMULSION INTRAVENOUS PRN
Status: DISCONTINUED | OUTPATIENT
Start: 2023-02-01 | End: 2023-02-01

## 2023-02-01 RX ORDER — METOPROLOL TARTRATE 1 MG/ML
1-2 INJECTION, SOLUTION INTRAVENOUS EVERY 5 MIN PRN
Status: DISCONTINUED | OUTPATIENT
Start: 2023-02-01 | End: 2023-02-01 | Stop reason: HOSPADM

## 2023-02-01 RX ORDER — OXYCODONE HYDROCHLORIDE 5 MG/1
5 TABLET ORAL
Status: DISCONTINUED | OUTPATIENT
Start: 2023-02-01 | End: 2023-02-01 | Stop reason: HOSPADM

## 2023-02-01 RX ORDER — ALBUTEROL SULFATE 90 UG/1
AEROSOL, METERED RESPIRATORY (INHALATION) PRN
Status: DISCONTINUED | OUTPATIENT
Start: 2023-02-01 | End: 2023-02-01

## 2023-02-01 RX ORDER — HYDROMORPHONE HCL IN WATER/PF 6 MG/30 ML
0.4 PATIENT CONTROLLED ANALGESIA SYRINGE INTRAVENOUS EVERY 5 MIN PRN
Status: DISCONTINUED | OUTPATIENT
Start: 2023-02-01 | End: 2023-02-01 | Stop reason: HOSPADM

## 2023-02-01 RX ORDER — FENTANYL CITRATE 50 UG/ML
INJECTION, SOLUTION INTRAMUSCULAR; INTRAVENOUS PRN
Status: DISCONTINUED | OUTPATIENT
Start: 2023-02-01 | End: 2023-02-01

## 2023-02-01 RX ORDER — ONDANSETRON 2 MG/ML
4 INJECTION INTRAMUSCULAR; INTRAVENOUS EVERY 30 MIN PRN
Status: DISCONTINUED | OUTPATIENT
Start: 2023-02-01 | End: 2023-02-01 | Stop reason: HOSPADM

## 2023-02-01 RX ORDER — HYDROMORPHONE HCL IN WATER/PF 6 MG/30 ML
0.2 PATIENT CONTROLLED ANALGESIA SYRINGE INTRAVENOUS EVERY 5 MIN PRN
Status: DISCONTINUED | OUTPATIENT
Start: 2023-02-01 | End: 2023-02-01 | Stop reason: HOSPADM

## 2023-02-01 RX ORDER — CEFAZOLIN SODIUM/WATER 2 G/20 ML
2 SYRINGE (ML) INTRAVENOUS SEE ADMIN INSTRUCTIONS
Status: DISCONTINUED | OUTPATIENT
Start: 2023-02-01 | End: 2023-02-01 | Stop reason: HOSPADM

## 2023-02-01 RX ORDER — ALBUTEROL SULFATE 0.83 MG/ML
2.5 SOLUTION RESPIRATORY (INHALATION) EVERY 6 HOURS PRN
Status: DISCONTINUED | OUTPATIENT
Start: 2023-02-01 | End: 2023-02-01 | Stop reason: HOSPADM

## 2023-02-01 RX ORDER — FENTANYL CITRATE 50 UG/ML
25 INJECTION, SOLUTION INTRAMUSCULAR; INTRAVENOUS EVERY 5 MIN PRN
Status: DISCONTINUED | OUTPATIENT
Start: 2023-02-01 | End: 2023-02-01 | Stop reason: HOSPADM

## 2023-02-01 RX ORDER — ACETAMINOPHEN 325 MG/1
650 TABLET ORAL
Status: DISCONTINUED | OUTPATIENT
Start: 2023-02-01 | End: 2023-02-01 | Stop reason: HOSPADM

## 2023-02-01 RX ORDER — LORAZEPAM 2 MG/ML
1 INJECTION INTRAMUSCULAR ONCE
Status: COMPLETED | OUTPATIENT
Start: 2023-02-01 | End: 2023-02-01

## 2023-02-01 RX ORDER — SODIUM CHLORIDE, SODIUM LACTATE, POTASSIUM CHLORIDE, CALCIUM CHLORIDE 600; 310; 30; 20 MG/100ML; MG/100ML; MG/100ML; MG/100ML
INJECTION, SOLUTION INTRAVENOUS CONTINUOUS PRN
Status: DISCONTINUED | OUTPATIENT
Start: 2023-02-01 | End: 2023-02-01

## 2023-02-01 RX ORDER — ACETAMINOPHEN 325 MG/1
975 TABLET ORAL ONCE
Status: COMPLETED | OUTPATIENT
Start: 2023-02-01 | End: 2023-02-01

## 2023-02-01 RX ORDER — OXYCODONE HYDROCHLORIDE 5 MG/1
5 TABLET ORAL EVERY 4 HOURS PRN
Status: DISCONTINUED | OUTPATIENT
Start: 2023-02-01 | End: 2023-02-01 | Stop reason: HOSPADM

## 2023-02-01 RX ORDER — LIDOCAINE 40 MG/G
CREAM TOPICAL
Status: DISCONTINUED | OUTPATIENT
Start: 2023-02-01 | End: 2023-02-01 | Stop reason: HOSPADM

## 2023-02-01 RX ORDER — HYDRALAZINE HYDROCHLORIDE 20 MG/ML
2.5-5 INJECTION INTRAMUSCULAR; INTRAVENOUS EVERY 10 MIN PRN
Status: DISCONTINUED | OUTPATIENT
Start: 2023-02-01 | End: 2023-02-01 | Stop reason: HOSPADM

## 2023-02-01 RX ORDER — FENTANYL CITRATE 50 UG/ML
50 INJECTION, SOLUTION INTRAMUSCULAR; INTRAVENOUS
Status: DISCONTINUED | OUTPATIENT
Start: 2023-02-01 | End: 2023-02-01 | Stop reason: HOSPADM

## 2023-02-01 RX ORDER — SCOLOPAMINE TRANSDERMAL SYSTEM 1 MG/1
1 PATCH, EXTENDED RELEASE TRANSDERMAL ONCE
Status: DISCONTINUED | OUTPATIENT
Start: 2023-02-01 | End: 2023-02-01 | Stop reason: HOSPADM

## 2023-02-01 RX ORDER — ONDANSETRON 2 MG/ML
INJECTION INTRAMUSCULAR; INTRAVENOUS PRN
Status: DISCONTINUED | OUTPATIENT
Start: 2023-02-01 | End: 2023-02-01

## 2023-02-01 RX ORDER — DEXAMETHASONE SODIUM PHOSPHATE 4 MG/ML
INJECTION, SOLUTION INTRA-ARTICULAR; INTRALESIONAL; INTRAMUSCULAR; INTRAVENOUS; SOFT TISSUE PRN
Status: DISCONTINUED | OUTPATIENT
Start: 2023-02-01 | End: 2023-02-01

## 2023-02-01 RX ORDER — LIDOCAINE HYDROCHLORIDE 10 MG/ML
INJECTION, SOLUTION INFILTRATION; PERINEURAL PRN
Status: DISCONTINUED | OUTPATIENT
Start: 2023-02-01 | End: 2023-02-01

## 2023-02-01 RX ORDER — ACETAMINOPHEN 500 MG
1000 TABLET ORAL EVERY 6 HOURS PRN
COMMUNITY
Start: 2023-02-01

## 2023-02-01 RX ORDER — IBUPROFEN 200 MG
600 TABLET ORAL EVERY 6 HOURS PRN
COMMUNITY
Start: 2023-02-01

## 2023-02-01 RX ORDER — FENTANYL CITRATE 50 UG/ML
50 INJECTION, SOLUTION INTRAMUSCULAR; INTRAVENOUS EVERY 5 MIN PRN
Status: DISCONTINUED | OUTPATIENT
Start: 2023-02-01 | End: 2023-02-01 | Stop reason: HOSPADM

## 2023-02-01 RX ORDER — EPHEDRINE SULFATE 50 MG/ML
INJECTION, SOLUTION INTRAMUSCULAR; INTRAVENOUS; SUBCUTANEOUS PRN
Status: DISCONTINUED | OUTPATIENT
Start: 2023-02-01 | End: 2023-02-01

## 2023-02-01 RX ORDER — LIDOCAINE HYDROCHLORIDE 40 MG/ML
SOLUTION TOPICAL PRN
Status: DISCONTINUED | OUTPATIENT
Start: 2023-02-01 | End: 2023-02-01

## 2023-02-01 RX ORDER — CEFAZOLIN SODIUM/WATER 2 G/20 ML
2 SYRINGE (ML) INTRAVENOUS
Status: COMPLETED | OUTPATIENT
Start: 2023-02-01 | End: 2023-02-01

## 2023-02-01 RX ADMIN — FENTANYL CITRATE 25 MCG: 50 INJECTION, SOLUTION INTRAMUSCULAR; INTRAVENOUS at 15:22

## 2023-02-01 RX ADMIN — ONDANSETRON 4 MG: 2 INJECTION INTRAMUSCULAR; INTRAVENOUS at 13:13

## 2023-02-01 RX ADMIN — PHENYLEPHRINE HYDROCHLORIDE 200 MCG: 10 INJECTION INTRAVENOUS at 13:12

## 2023-02-01 RX ADMIN — SODIUM CHLORIDE, POTASSIUM CHLORIDE, SODIUM LACTATE AND CALCIUM CHLORIDE: 600; 310; 30; 20 INJECTION, SOLUTION INTRAVENOUS at 13:31

## 2023-02-01 RX ADMIN — FENTANYL CITRATE 25 MCG: 50 INJECTION, SOLUTION INTRAMUSCULAR; INTRAVENOUS at 14:22

## 2023-02-01 RX ADMIN — ACETAMINOPHEN 975 MG: 325 TABLET, FILM COATED ORAL at 14:30

## 2023-02-01 RX ADMIN — PROPOFOL 50 MCG/KG/MIN: 10 INJECTION, EMULSION INTRAVENOUS at 12:57

## 2023-02-01 RX ADMIN — SODIUM CHLORIDE, POTASSIUM CHLORIDE, SODIUM LACTATE AND CALCIUM CHLORIDE: 600; 310; 30; 20 INJECTION, SOLUTION INTRAVENOUS at 12:29

## 2023-02-01 RX ADMIN — SCOPALAMINE 1 PATCH: 1 PATCH, EXTENDED RELEASE TRANSDERMAL at 10:40

## 2023-02-01 RX ADMIN — PHENYLEPHRINE HYDROCHLORIDE 100 MCG: 10 INJECTION INTRAVENOUS at 13:03

## 2023-02-01 RX ADMIN — PROPOFOL 200 MG: 10 INJECTION, EMULSION INTRAVENOUS at 12:54

## 2023-02-01 RX ADMIN — PHENYLEPHRINE HYDROCHLORIDE 150 MCG: 10 INJECTION INTRAVENOUS at 13:42

## 2023-02-01 RX ADMIN — Medication 15 MG: at 13:16

## 2023-02-01 RX ADMIN — Medication 2 G: at 12:44

## 2023-02-01 RX ADMIN — DEXAMETHASONE SODIUM PHOSPHATE 4 MG: 4 INJECTION, SOLUTION INTRA-ARTICULAR; INTRALESIONAL; INTRAMUSCULAR; INTRAVENOUS; SOFT TISSUE at 12:54

## 2023-02-01 RX ADMIN — LIDOCAINE HYDROCHLORIDE 4 ML: 40 SOLUTION TOPICAL at 13:25

## 2023-02-01 RX ADMIN — OXYCODONE HYDROCHLORIDE 5 MG: 5 TABLET ORAL at 14:34

## 2023-02-01 RX ADMIN — ALBUTEROL SULFATE 2.5 MG: 2.5 SOLUTION RESPIRATORY (INHALATION) at 10:39

## 2023-02-01 RX ADMIN — Medication 60 MG: at 12:54

## 2023-02-01 RX ADMIN — FENTANYL CITRATE 50 MCG: 50 INJECTION, SOLUTION INTRAMUSCULAR; INTRAVENOUS at 14:09

## 2023-02-01 RX ADMIN — FENTANYL CITRATE 100 MCG: 50 INJECTION, SOLUTION INTRAMUSCULAR; INTRAVENOUS at 12:54

## 2023-02-01 RX ADMIN — ALBUTEROL SULFATE 2 PUFF: 108 INHALANT RESPIRATORY (INHALATION) at 13:56

## 2023-02-01 RX ADMIN — PHENYLEPHRINE HYDROCHLORIDE 100 MCG: 10 INJECTION INTRAVENOUS at 13:07

## 2023-02-01 RX ADMIN — ALBUTEROL SULFATE 2 PUFF: 108 INHALANT RESPIRATORY (INHALATION) at 13:51

## 2023-02-01 RX ADMIN — MIDAZOLAM 2 MG: 1 INJECTION INTRAMUSCULAR; INTRAVENOUS at 12:44

## 2023-02-01 RX ADMIN — SODIUM CHLORIDE, POTASSIUM CHLORIDE, SODIUM LACTATE AND CALCIUM CHLORIDE: 600; 310; 30; 20 INJECTION, SOLUTION INTRAVENOUS at 11:18

## 2023-02-01 RX ADMIN — LIDOCAINE HYDROCHLORIDE 50 MG: 10 INJECTION, SOLUTION INFILTRATION; PERINEURAL at 12:54

## 2023-02-01 RX ADMIN — DEXMEDETOMIDINE HYDROCHLORIDE 20 MCG: 100 INJECTION, SOLUTION INTRAVENOUS at 13:06

## 2023-02-01 RX ADMIN — GLYCOPYRROLATE 0.2 MG: 0.2 INJECTION, SOLUTION INTRAMUSCULAR; INTRAVENOUS at 13:13

## 2023-02-01 RX ADMIN — GLYCOPYRROLATE 0.2 MG: 0.2 INJECTION, SOLUTION INTRAMUSCULAR; INTRAVENOUS at 13:06

## 2023-02-01 RX ADMIN — LORAZEPAM 1 MG: 2 INJECTION INTRAMUSCULAR; INTRAVENOUS at 11:16

## 2023-02-01 RX ADMIN — ALBUTEROL SULFATE 4 PUFF: 108 INHALANT RESPIRATORY (INHALATION) at 13:49

## 2023-02-01 RX ADMIN — PHENYLEPHRINE HYDROCHLORIDE 50 MCG: 10 INJECTION INTRAVENOUS at 13:34

## 2023-02-01 RX ADMIN — DEXMEDETOMIDINE HYDROCHLORIDE 20 MCG: 100 INJECTION, SOLUTION INTRAVENOUS at 12:48

## 2023-02-01 ASSESSMENT — COPD QUESTIONNAIRES: COPD: 1

## 2023-02-01 ASSESSMENT — ACTIVITIES OF DAILY LIVING (ADL)
ADLS_ACUITY_SCORE: 35

## 2023-02-01 ASSESSMENT — LIFESTYLE VARIABLES: TOBACCO_USE: 1

## 2023-02-01 NOTE — PROGRESS NOTES
"SPIRITUAL HEALTH SERVICES Progress Note  RH Pre-Op    Saw pt Swetha Patterson per her request for  support before her procedure.  Rukhsana reported that she was recently diagnosed with a malignancy and reflected, \"It feels like I just get to the top of the ladder and then it's knocked over.\"  She questioned God's relationship to her suffering.  Rukhsana named her step-father, sister, and friend Rosalba as being core to her support network.  She identifies as Yazidism and welcomed prayer.  Rukhsana engaged in a few moments of guided contemplative breathing before praying.    Plan: No further plans as pt anticipates discharging home after her procedure.     Filemon Talbert M.Div., [facility]  Staff     [facility] routine referrals *[phone]  [facility] available 24/7 for emergent requests/referrals, either by paging the on-call  or by entering an ASAP/STAT consult in Epic (this will also page the on-call ).  "

## 2023-02-01 NOTE — ANESTHESIA POSTPROCEDURE EVALUATION
Patient: Swetha Patterson    Procedure: Procedure(s):  EXploration of  NECK left       Anesthesia Type:  General    Note:  Disposition: Outpatient   Postop Pain Control: Uneventful            Sign Out: Well controlled pain   PONV: No   Neuro/Psych: Uneventful            Sign Out: Acceptable/Baseline neuro status   Airway/Respiratory: Uneventful            Sign Out: AIRWAY IN SITU/Resp. Support   CV/Hemodynamics: Uneventful            Sign Out: Acceptable CV status; No obvious hypovolemia; No obvious fluid overload   Other NRE: NONE   DID A NON-ROUTINE EVENT OCCUR? No           Last vitals:  Vitals Value Taken Time   BP 99/59 02/01/23 1412   Temp     Pulse 92 02/01/23 1415   Resp 20 02/01/23 1415   SpO2 99 % 02/01/23 1415   Vitals shown include unvalidated device data.    Electronically Signed By: Yonathan Shields MD  February 1, 2023  2:15 PM

## 2023-02-01 NOTE — OP NOTE
General Surgery Operative Note      Pre-operative diagnosis: Left neck subcutaneous mass   Post-operative diagnosis: Same    Procedure: Left neck exploration, no mass identified   Surgeon: Ibeth Conner MD   Assistant(s): Chris Guardado PA-C   Anesthesia: General    Estimated blood loss: 2 cc     Specimens: None     Indications for procedure: This is a 52-year-old female with history of a lipoma excision in November 2021 from her left shoulder.  The lipoma at that time was 2.4 x 1.6 x 1.1 cm.  The patient reports that since that time she has noticed slow growth of a painful mass in the same area and suspects recurrence of the lipoma that was excised.  Indeed, she did seem to have a masslike area at the anterior border of her trapezius muscle.  She is elected for excision after discussion of the risks and benefits.       Description of procedure:  The left neck and shoulder were prepped and draped in standard sterile fashion.  The area of interest was marked in the preop area under the guidance of the patient who palpated the mass.  An incision was made with a length of 2 cm.  The incision was carried into the subcutaneous tissue down to the trapezius muscle.  No fat density mass was identified within the superficial tissue.  There was some fullness to her trapezius muscle beneath the fascia so we opened the fascia for approximately 1-1/2 cm and spread muscle fibers to look for an intramuscular lipoma.  No lipoma was identified.  We explored the subcutaneous tissue in all directions and could not identify any mass.  At this point, we elected to close the incision in layers.  The trapezius muscle fascia was closed with interrupted 3-0 Vicryl.  The subcutaneous tissue was closed with interrupted 3-0 Vicryl in the deep dermis.  The skin was closed with a 4-0 Vicryl in the subcuticular fashion the incision was covered with skin glue. The patient tolerated the procedure well.  Sponge and needle counts were correct at the  end of the case.     Ibeth Conner MD

## 2023-02-01 NOTE — ANESTHESIA CARE TRANSFER NOTE
Patient: Swetha Patterson    Procedure: Procedure(s):  EXploration of  NECK left       Diagnosis: Lipoma of skin and subcutaneous tissue [D17.30]  Diagnosis Additional Information: No value filed.    Anesthesia Type:   General     Note:    Oropharynx: oropharynx clear of all foreign objects  Level of Consciousness: awake  Oxygen Supplementation: face mask  Level of Supplemental Oxygen (L/min / FiO2): 4  Independent Airway: airway patency satisfactory and stable  Dentition: dentition unchanged  Vital Signs Stable: post-procedure vital signs reviewed and stable  Report to RN Given: handoff report given  Patient transferred to: PACU    Handoff Report: Identifed the Patient, Identified the Reponsible Provider, Reviewed the pertinent medical history, Discussed the surgical course, Reviewed Intra-OP anesthesia mangement and issues during anesthesia, Set expectations for post-procedure period and Allowed opportunity for questions and acknowledgement of understanding      Vitals:  Vitals Value Taken Time   /85 02/01/23 1354   Temp     Pulse 104 02/01/23 1359   Resp 14 02/01/23 1359   SpO2 99 % 02/01/23 1359   Vitals shown include unvalidated device data.    Electronically Signed By: LEIGH York CRNA  February 1, 2023  2:00 PM

## 2023-02-01 NOTE — ANESTHESIA PROCEDURE NOTES
Airway       Patient location during procedure: OR       Procedure Start/Stop Times: 2/1/2023 1:17 PM  Staff -        CRNA: Sarah Downing APRN CRNA       Performed By: CRNA  Consent for Airway        Urgency: elective  Indications and Patient Condition       Indications for airway management: elizabeth-procedural       Induction type:intravenous       Mask difficulty assessment: 1 - vent by mask    Final Airway Details       Final airway type: endotracheal airway       Successful airway: ETT - single and Oral  Endotracheal Airway Details        ETT size (mm): 7.0       Cuffed: yes       Successful intubation technique: video laryngoscopy       VL Blade Size: Glidescope 3       Grade View of Cords: 1       Adjucts: stylet       Position: Right       Measured from: gums/teeth       Secured at (cm): 21       Bite block used: Soft    Post intubation assessment        Placement verified by: capnometry, equal breath sounds and chest rise        Number of attempts at approach: 1       Number of other approaches attempted: 0       Secured with: plastic tape       Ease of procedure: easy       Dentition: Unchanged    Medication(s) Administered   Medication Administration Time: 2/1/2023 1:17 PM

## 2023-02-01 NOTE — DISCHARGE INSTRUCTIONS
Dr. Conner  [facility] [phone]     Maximum acetaminophen (Tylenol) dose from all sources should not exceed 4 grams (4000 mg) per day. You were last given Tylenol at 2:30 PM your next dose if needed can be taken at 8:30 PM.     GENERAL ANESTHESIA OR SEDATION ADULT DISCHARGE INSTRUCTIONS   SPECIAL PRECAUTIONS FOR 24 HOURS AFTER SURGERY    IT IS NOT UNUSUAL TO FEEL LIGHT-HEADED OR FAINT, UP TO 24 HOURS AFTER SURGERY OR WHILE TAKING PAIN MEDICATION.  IF YOU HAVE THESE SYMPTOMS; SIT FOR A FEW MINUTES BEFORE STANDING AND HAVE SOMEONE ASSIST YOU WHEN YOU GET UP TO WALK OR USE THE BATHROOM.    YOU SHOULD REST AND RELAX FOR THE NEXT 24 HOURS AND YOU MUST MAKE ARRANGEMENTS TO HAVE SOMEONE STAY WITH YOU FOR AT LEAST 24 HOURS AFTER YOUR DISCHARGE.  AVOID HAZARDOUS AND STRENUOUS ACTIVITIES.  DO NOT MAKE IMPORTANT DECISIONS FOR 24 HOURS.    DO NOT DRIVE ANY VEHICLE OR OPERATE MECHANICAL EQUIPMENT FOR 24 HOURS FOLLOWING THE END OF YOUR SURGERY.  EVEN THOUGH YOU MAY FEEL NORMAL, YOUR REACTIONS MAY BE AFFECTED BY THE MEDICATION YOU HAVE RECEIVED.    DO NOT DRINK ALCOHOLIC BEVERAGES FOR 24 HOURS FOLLOWING YOUR SURGERY.    DRINK CLEAR LIQUIDS (APPLE JUICE, GINGER ALE, 7-UP, BROTH, ETC.).  PROGRESS TO YOUR REGULAR DIET AS YOU FEEL ABLE.    YOU MAY HAVE A DRY MOUTH, A SORE THROAT, MUSCLES ACHES OR TROUBLE SLEEPING.  THESE SHOULD GO AWAY AFTER 24 HOURS.    CALL YOUR DOCTOR FOR ANY OF THE FOLLOWING:  SIGNS OF INFECTION (FEVER, GROWING TENDERNESS AT THE SURGERY SITE, A LARGE AMOUNT OF DRAINAGE OR BLEEDING, SEVERE PAIN, FOUL-SMELLING DRAINAGE, REDNESS OR SWELLING.    IT HAS BEEN OVER 8 TO 10 HOURS SINCE SURGERY AND YOU ARE STILL NOT ABLE TO URINATE (PASS WATER).         When to remove the Scopolamine patch: Thursday, February 2nd at 1 PM.    How to remove Scope Patch:  1.When the scopolamine patch is no longer needed, remove the patch and fold it in half with the sticky side together and dispose of it.   2. Wash your hands  and the area behind your ear thoroughly with soap and water to remove any traces of scopolamine from the area.

## 2023-02-02 ENCOUNTER — TELEPHONE (OUTPATIENT)
Dept: SURGERY | Facility: CLINIC | Age: 53
End: 2023-02-02
Payer: COMMERCIAL

## 2023-02-02 DIAGNOSIS — M25.512 CHRONIC LEFT SHOULDER PAIN: ICD-10-CM

## 2023-02-02 DIAGNOSIS — G89.29 CHRONIC LEFT SHOULDER PAIN: ICD-10-CM

## 2023-02-02 RX ORDER — OXYCODONE HYDROCHLORIDE 5 MG/1
5 TABLET ORAL EVERY 6 HOURS PRN
Qty: 12 TABLET | Refills: 0 | Status: SHIPPED | OUTPATIENT
Start: 2023-02-02 | End: 2023-02-20

## 2023-02-02 NOTE — TELEPHONE ENCOUNTER
S/p Left neck exploration, no mass identified  Procedure date: 2/1/23  Surgeon: Dr. Conner    Patient reports severe pain in left neck that radiates up toward her chin and toward her back. Throbbing pain that becomes sharp / shooting at times.   Rates pain at 10/10.  She tells me that she did not sleep last night because of pain.      She is taking oxycodone as prescribed - 1 tab every six hours.  She is also taking ibuprofen 600mg alternating with 2 ES Tylenol every 6 hours.  Patient reports that pain is not controlled by current pain medication.     Would like to get something different or would like to get a refill of oxycodone so she can take two at a time.    Patient uses [facility] in [place].

## 2023-02-03 ENCOUNTER — NURSE TRIAGE (OUTPATIENT)
Dept: INTERNAL MEDICINE | Facility: CLINIC | Age: 53
End: 2023-02-03
Payer: COMMERCIAL

## 2023-02-03 DIAGNOSIS — J44.9 COPD, MODERATE (H): Primary | ICD-10-CM

## 2023-02-03 NOTE — TELEPHONE ENCOUNTER
Call to patient. States her whole body is achy. Her throat hurts from the tube. She has a cough and has to hold her stomach when she coughs because it hurts so much. States her lungs feel tight and she is coughing up green phlegm. Patient has not checked her temperature but states she did have the sweats earlier today. States she has been moving around a lot to try to prevent pnuemonia. States she knows when she is getting sick and she is getting sick. States she doesn't want to end up in the hospital. She also doesn't want to go to the ER because they put her in the hospital due to her COPD. Patient is requesting an antibiotic.      States when they went in to find the lipoma they couldn't find it. States they don't know if it is floating around inside of her somewhere or if it popped,      Reason for Disposition    Known COPD or other severe lung disease (i.e., bronchiectasis, cystic fibrosis, lung surgery) and worsening symptoms (i.e., increased sputum purulence or amount, increased breathing difficulty)    Additional Information    Negative: Bluish (or gray) lips or face    Negative: SEVERE difficulty breathing (e.g., struggling for each breath, speaks in single words)    Negative: Rapid onset of cough and has hives    Negative: Coughing started suddenly after medicine, an allergic food or bee sting    Negative: Difficulty breathing after exposure to flames, smoke, or fumes    Negative: Sounds like a life-threatening emergency to the triager    Negative: Previous asthma attacks and this feels like asthma attack    Negative: Dry cough (non-productive; no sputum or minimal clear sputum) and within 14 days of COVID-19 Exposure    Negative: MODERATE difficulty breathing (e.g., speaks in phrases, SOB even at rest, pulse 100-120) and still present when not coughing    Negative: Chest pain present when not coughing    Negative: Passed out (i.e., fainted, collapsed and was not responding)    Negative: Patient sounds  very sick or weak to the triager    Negative: MILD difficulty breathing (e.g., minimal/no SOB at rest, SOB with walking, pulse <100) and still present when not coughing    Negative: Coughed up > 1 tablespoon (15 ml) blood (Exception: Blood-tinged sputum.)    Negative: Fever > 103 F (39.4 C)    Negative: Fever > 101 F (38.3 C) and over 60 years of age    Negative: Fever > 100.0 F (37.8 C) and has diabetes mellitus or a weak immune system (e.g., HIV positive, cancer chemotherapy, organ transplant, splenectomy, chronic steroids)    Negative: Fever > 100.0 F (37.8 C) and bedridden (e.g., nursing home patient, stroke, chronic illness, recovering from surgery)    Negative: Increasing ankle swelling    Negative: Wheezing is present    Negative: SEVERE coughing spells (e.g., whooping sound after coughing, vomiting after coughing)    Negative: Coughing up keyla-colored (reddish-brown) or blood-tinged sputum    Negative: Fever present > 3 days (72 hours)    Negative: Fever returns after gone for over 24 hours and symptoms worse or not improved    Negative: Using nasal washes and pain medicine > 24 hours and sinus pain persists    Protocols used: COUGH-A-OH

## 2023-02-03 NOTE — TELEPHONE ENCOUNTER
Patient is calling because she had surgery two days ago and she thinks she may be coming down with pneumonia. She has had this happen in the past after surgery.

## 2023-02-04 RX ORDER — DOXYCYCLINE 100 MG/1
100 CAPSULE ORAL 2 TIMES DAILY
Qty: 20 CAPSULE | Refills: 0 | Status: SHIPPED | OUTPATIENT
Start: 2023-02-04 | End: 2023-08-29

## 2023-02-06 NOTE — TELEPHONE ENCOUNTER
Left message on cell voicemail asking patient to return call to clinic.  Please advise pt that antibiotic was sent to pharmacy.  Also advise patient she is due for an office visit and assist her in scheduling.  SERJIO Chandra R.N.

## 2023-02-07 ENCOUNTER — NURSE TRIAGE (OUTPATIENT)
Dept: INTERNAL MEDICINE | Facility: CLINIC | Age: 53
End: 2023-02-07
Payer: COMMERCIAL

## 2023-02-07 NOTE — TELEPHONE ENCOUNTER
Nurse Triage SBAR    Is this a 2nd Level Triage? YES, LICENSED PRACTITIONER REVIEW IS REQUIRED    Situation: Patient calls with 3 day onset of chills, sweating, constant nausea, and an oral temperature of 93 degrees    Background: Had a colonoscopy 01/20/23, had a lipoma exploration 02/01/23    Assessment: Patient calls concerned about multiple symptoms.   Patient has an oral temp of 93 and did not eat or drink for 10 minutes prior to taking temperature.   Patient has persistent abdominal pain.  Patient hadn't passed a BM for 3 days prior to today and is concerned the bm backed up into the stomach.   Patient feels constantly nauseated and has vomited. Patient has been unable to take medications, including new antibiotic, due to constant nausea and vomiting.  Patient states they are concerned that something got messed up with colonoscopy and is concerned for cancer.   Patient states that 02/01 incision is healing.    Patient is very concerned about oral temp of 93.    Protocol Recommended Disposition:   Go To ED/UCC Now (Or To Office With PCP Approval)    Recommendation: Please give further care advice for patient. Does patient need appointment?     Routed to provider    Does the patient meet one of the following criteria for ADS visit consideration? 16+ years old, with an [facility] PCP     TIP  Providers, please consider if this condition is appropriate for management at one of our Acute and Diagnostic Services sites.     If patient is a good candidate, please use dotphrase <dot>triageresponse and select Refer to ADS to document.  Reason for Disposition    Patient sounds very sick or weak to the triager    Additional Information    Negative: Passed out (i.e., fainted, collapsed and was not responding)    Negative: Shock suspected (e.g., cold/pale/clammy skin, too weak to stand, low BP, rapid pulse)    Negative: Sounds like a life-threatening emergency to the triager    Negative: Chest pain    Negative: Pain is mainly  "in upper abdomen (if needed ask: 'is it mainly above the belly button?')    Negative: Abdominal pain and pregnant < 20 weeks    Negative: Abdominal pain and pregnant 20 or more weeks    Negative: SEVERE abdominal pain (e.g., excruciating)    Negative: Vomiting red blood or black (coffee ground) material    Negative: Bloody, black, or tarry bowel movements  (Exception: Chronic-unchanged black-grey bowel movements and is taking iron pills or Pepto-Bismol.)    Negative: Vomiting bile (green color)    Negative: Constant abdominal pain lasting > 2 hours    Answer Assessment - Initial Assessment Questions  1. LOCATION: \"Where does it hurt?\"       Lower abdomen  2. RADIATION: \"Does the pain shoot anywhere else?\" (e.g., chest, back)      no  3. ONSET: \"When did the pain begin?\" (e.g., minutes, hours or days ago)       Present since January, worsened over past three days  4. SUDDEN: \"Gradual or sudden onset?\"      gradual  5. PATTERN \"Does the pain come and go, or is it constant?\"     - If constant: \"Is it getting better, staying the same, or worsening?\"       (Note: Constant means the pain never goes away completely; most serious pain is constant and it progresses)      - If intermittent: \"How long does it last?\" \"Do you have pain now?\"      (Note: Intermittent means the pain goes away completely between bouts)      constant  6. SEVERITY: \"How bad is the pain?\"  (e.g., Scale 1-10; mild, moderate, or severe)    - MILD (1-3): doesn't interfere with normal activities, abdomen soft and not tender to touch     - MODERATE (4-7): interferes with normal activities or awakens from sleep, abdomen tender to touch     - SEVERE (8-10): excruciating pain, doubled over, unable to do any normal activities       moderate  7. RECURRENT SYMPTOM: \"Have you ever had this type of stomach pain before?\" If Yes, ask: \"When was the last time?\" and \"What happened that time?\"       Yes, had colonoscopy  8. CAUSE: \"What do you think is causing the " "stomach pain?\"      Unsure? Possibly backed up bowels, possibly GI infection  9. RELIEVING/AGGRAVATING FACTORS: \"What makes it better or worse?\" (e.g., movement, antacids, bowel movement)      Unable to eat, feels nauseated all of the time  10. OTHER SYMPTOMS: \"Do you have any other symptoms?\" (e.g., back pain, diarrhea, fever, urination pain, vomiting)        Temp of 93, constipation, constant nausea  11. PREGNANCY: \"Is there any chance you are pregnant?\" \"When was your last menstrual period?\"        no    Protocols used: ABDOMINAL PAIN - FEMALE-A-OH      "

## 2023-02-08 ENCOUNTER — VIRTUAL VISIT (OUTPATIENT)
Dept: PSYCHOLOGY | Facility: CLINIC | Age: 53
End: 2023-02-08
Payer: COMMERCIAL

## 2023-02-08 DIAGNOSIS — F31.62 BIPOLAR DISORDER, CURRENT EPISODE MIXED, MODERATE (H): Primary | ICD-10-CM

## 2023-02-08 PROCEDURE — 90834 PSYTX W PT 45 MINUTES: CPT | Mod: VID | Performed by: SOCIAL WORKER

## 2023-02-08 ASSESSMENT — ANXIETY QUESTIONNAIRES
2. NOT BEING ABLE TO STOP OR CONTROL WORRYING: SEVERAL DAYS
6. BECOMING EASILY ANNOYED OR IRRITABLE: NEARLY EVERY DAY
IF YOU CHECKED OFF ANY PROBLEMS ON THIS QUESTIONNAIRE, HOW DIFFICULT HAVE THESE PROBLEMS MADE IT FOR YOU TO DO YOUR WORK, TAKE CARE OF THINGS AT HOME, OR GET ALONG WITH OTHER PEOPLE: SOMEWHAT DIFFICULT
GAD7 TOTAL SCORE: 14
4. TROUBLE RELAXING: NEARLY EVERY DAY
5. BEING SO RESTLESS THAT IT IS HARD TO SIT STILL: MORE THAN HALF THE DAYS
7. FEELING AFRAID AS IF SOMETHING AWFUL MIGHT HAPPEN: SEVERAL DAYS
GAD7 TOTAL SCORE: 14
GAD7 TOTAL SCORE: 14
1. FEELING NERVOUS, ANXIOUS, OR ON EDGE: NEARLY EVERY DAY
8. IF YOU CHECKED OFF ANY PROBLEMS, HOW DIFFICULT HAVE THESE MADE IT FOR YOU TO DO YOUR WORK, TAKE CARE OF THINGS AT HOME, OR GET ALONG WITH OTHER PEOPLE?: SOMEWHAT DIFFICULT
7. FEELING AFRAID AS IF SOMETHING AWFUL MIGHT HAPPEN: SEVERAL DAYS
3. WORRYING TOO MUCH ABOUT DIFFERENT THINGS: SEVERAL DAYS

## 2023-02-08 NOTE — PROGRESS NOTES
[facility]                                     Progress Note    Patient Name: Swetha Patterson  Date: 2023           Service Type: Individual      Session Start Time: 5:30pm  Session End Time: 6:15pm     Session Length: 45mn    Session #: 3    Attendees: Client attended alone    Service Modality:  Video Visit:      Provider verified identity through the following two step process.  Patient provided:  Patient     Telemedicine Visit: The patient's condition can be safely assessed and treated via synchronous audio and visual telemedicine encounter.      Reason for Telemedicine Visit: Patient has requested telehealth visit    Originating Site (Patient Location): Patient's home    Distant Site (Provider Location): Provider Remote Setting- Home Office    Consent:  The patient/guardian has verbally consented to: the potential risks and benefits of telemedicine (video visit) versus in person care; bill my insurance or make self-payment for services provided; and responsibility for payment of non-covered services.     Patient would like the video invitation sent by:  My Chart    Mode of Communication:  Video Conference via [employer]    Distant Location (Provider):  Off-site    As the provider I attest to compliance with applicable laws and regulations related to telemedicine.    DATA  Interactive Complexity: No  Crisis: No        Progress Since Last Session (Related to Symptoms / Goals / Homework):   Symptoms: Worsening Medical Complications affect Mood and Anxiety    Homework: Completed in session      Episode of Care Goals: Satisfactory progress - PREPARATION (Decided to change - considering how); Intervened by negotiating a change plan and determining options / strategies for behavior change, identifying triggers, exploring social supports, and working towards setting a date to begin behavior change     Current / Ongoing Stressors and Concerns:   Disabled lives with her 80 yr old Father and  plays a Care-taking Role      Treatment Objective(s) Addressed in This Session:   Mindful approach to mood management     Intervention:   Supportive approach to Mindfulness    Assessments completed prior to visit:  The following assessments were completed by patient for this visit:  PHQ9:   PHQ-9 SCORE 2/20/2020 3/6/2020 8/17/2022 9/28/2022 11/22/2022 12/16/2022 1/13/2023   PHQ-9 Total Score - - - - - - -   PHQ-9 Total Score MyChart 17 (Moderately severe depression) - 5 (Mild depression) 9 (Mild depression) 12 (Moderate depression) 13 (Moderate depression) 13 (Moderate depression)   PHQ-9 Total Score 15 16 5 - 12 13 13   Some encounter information is confidential and restricted. Go to Review [employer] activity to see all data.     GAD7:   GIDEON-7 SCORE 5/2/2019 4/3/2020 3/31/2022 9/28/2022 11/22/2022 12/13/2022 2/8/2023   Total Score - - - - - - -   Total Score - - - 11 (moderate anxiety) 16 (severe anxiety) 17 (severe anxiety) 14 (moderate anxiety)   Total Score 21 14 6 - 16 17 14   Some encounter information is confidential and restricted. Go to Review [employer] activity to see all data.     PROMIS 10-Global Health (only subscores and total score):   PROMIS-10 Scores Only 11/9/2017 9/11/2021 9/11/2021 10/21/2021 2/22/2022 2/8/2023   Global Mental Health Score 8 7 7 7 16 6   Global Physical Health Score 10 9 9 9 16 8   PROMIS TOTAL - SUBSCORES 18 16 16 16 32 14   Some encounter information is confidential and restricted. Go to Review [employer] activity to see all data.     [place] Suicide Severity Rating Scale (Lifetime/Recent)  [place] Suicide Severity Rating (Lifetime/Recent) 12/26/2018 1/2/2019 11/23/2022   Q1 Wished to be Dead (Past Month) no no -   Q2 Suicidal Thoughts (Past Month) no no -   Q6 Suicide Behavior (Lifetime) - no -   1. Wish to be Dead (Lifetime) - - 0   2. Non-Specific Active Suicidal Thoughts (Lifetime) - - 0   Has subject engaged in non-suicidal self-injurious behavior? (Lifetime) - - 1          ASSESSMENT: Current Emotional / Mental Status (status of significant symptoms):   Risk status (Self / Other harm or suicidal ideation)   Patient denies current fears or concerns for personal safety.   Patient denies current or recent suicidal ideation or behaviors.   Patient denies current or recent homicidal ideation or behaviors.   Patient denies current or recent self injurious behavior or ideation.   Patient denies other safety concerns.   Patient reports there has been no change in risk factors since their last session.     Patient reports there has been no change in protective factors since their last session.     Recommended that patient call 911 or go to the local ED should there be a change in any of these risk factors.     Appearance:   Appropriate    Eye Contact:   Good    Psychomotor Behavior: Normal    Attitude:   Cooperative    Orientation:   Person Place Time Situation All   Speech    Rate / Production: Hyperverbal  Normal     Volume:  Normal    Mood:    Anxious  Depressed  Hypomanic    Affect:    Worrisome    Thought Content:  Clear    Thought Form:  Coherent  Logical    Insight:    Good      Medication Review:   Changes to psychiatric medications, see updated Medication List in EPIC.      Medication Compliance:   Yes     Changes in Health Issues:   Yes: Diabetes, Associated Psychological Distress  Weight / dietary issues, Associated Psychological Distress     Chemical Use Review:   Substance Use: Chemical use reviewed, no active concerns identified      Tobacco Use: No current tobacco use.      Diagnosis:  1. Bipolar disorder, current episode mixed, moderate (H)        Collateral Reports Completed:   Routed note to PCP    PLAN: (Patient Tasks / Therapist Tasks / Other)  Put together treatment plan        MAY Hyde                                                         ______________________________________________________________________    Individual Treatment  "Plan    Patient's Name: Swetha Patterson  YOB: 1970    Date of Creation: 2/8/2023    Date Treatment Plan Last Reviewed/Revised: 2/8/2023      DSM5 Diagnoses: 296.52 Bipolar I Disorder Current or Most Recent Episode Depressed, Moderate or 300.02 (F41.1) Generalized Anxiety Disorder  Psychosocial / Contextual Factors: SILVESTRE  PROMIS (reviewed every 90 days): 14    Referral / Collaboration:  Referral to another professional/service is not indicated at this time..    Anticipated number of session for this episode of care: 9-12 sessions  Anticipation frequency of session: Monthly  Anticipated Duration of each session: 38-52 minutes  Treatment plan will be reviewed in 90 days or when goals have been changed.       MeasurableTreatment Goal(s) related to diagnosis / functional impairment(s)  Goal 1: Patient will increase a Mindful Practice    I will know I've met my goal when I have more control over my thoughts and Emotions.      Objective #A (Patient Action)    Patient will Practice staying in the \"Present Moment\" each session  Status: Continued    Intervention(s)  Therapist will teach Core Mindfulness skills and provide a Validating Environment       Rogers Eller    [facility]   2/8/2023    .  Answers for HPI/ROS submitted by the patient on 2/8/2023  GIDEON 7 TOTAL SCORE: 14      "

## 2023-02-08 NOTE — TELEPHONE ENCOUNTER
Call placed to patient. Patient is feeling slightly better. Patient is having a home care nurse come by at 2 pm/ Patient will reassess but if they feel worse or similar to yesterday, they will got to the ER.

## 2023-02-09 ENCOUNTER — TRANSFERRED RECORDS (OUTPATIENT)
Dept: HEALTH INFORMATION MANAGEMENT | Facility: CLINIC | Age: 53
End: 2023-02-09
Payer: COMMERCIAL

## 2023-02-09 LAB
ALT SERPL-CCNC: 33 IU/L (ref 0–32)
AST SERPL-CCNC: 20 IU/L (ref 0–40)
CREATININE (EXTERNAL): 0.56 MG/DL (ref 0.57–1)
GFR ESTIMATED (EXTERNAL): 109 ML/MIN/1.73
GLUCOSE (EXTERNAL): 103 MG/DL (ref 70–99)
POTASSIUM (EXTERNAL): 4.7 MMOL/L (ref 3.5–5.2)
TSH SERPL-ACNC: 1.38 UIU/ML (ref 0.45–4.5)

## 2023-02-14 ENCOUNTER — HOSPITAL ENCOUNTER (OUTPATIENT)
Facility: CLINIC | Age: 53
End: 2023-02-14
Payer: COMMERCIAL

## 2023-02-14 DIAGNOSIS — M25.512 CHRONIC LEFT SHOULDER PAIN: Primary | ICD-10-CM

## 2023-02-14 DIAGNOSIS — G89.29 CHRONIC LEFT SHOULDER PAIN: Primary | ICD-10-CM

## 2023-02-15 DIAGNOSIS — I10 HTN, GOAL BELOW 140/90: ICD-10-CM

## 2023-02-16 NOTE — TELEPHONE ENCOUNTER
Pending Prescriptions:                       Disp   Refills    lisinopril (ZESTRIL) 20 MG tablet [Pharmac*90 tab*0        Sig: TAKE ONE TABLET BY MOUTH EVERY DAY      Routing refill request to provider for review/approval because:  Labs out of range:  creat    Please advise, thanks.

## 2023-02-17 ENCOUNTER — TELEPHONE (OUTPATIENT)
Dept: SURGERY | Facility: CLINIC | Age: 53
End: 2023-02-17

## 2023-02-17 DIAGNOSIS — D17.22 LIPOMA OF LEFT SHOULDER: Primary | ICD-10-CM

## 2023-02-17 RX ORDER — METHOCARBAMOL 500 MG/1
500 TABLET, FILM COATED ORAL 4 TIMES DAILY PRN
Qty: 10 TABLET | Refills: 0 | Status: ON HOLD | OUTPATIENT
Start: 2023-02-17 | End: 2023-06-26

## 2023-02-17 NOTE — TELEPHONE ENCOUNTER
The patient called 2 weeks out from her attempted excision of a left neck lipoma.  No lesion was found at the time of the surgery.  The patient states that she has swelling in the area and pain, as well as a very prominent vein.  She states she still has some pain pills, but does not want to take those.  She is asking for a muscle relaxant.  She states she has muscle spasms in her neck, going down her left arm and down into her back.  She is absolutely convinced this is muscle spasm.  I explained that I had some concern that there could be some underlying problem, such as hematoma or postoperative swelling or even conceivably venous thrombosis.  I told the patient that I thought she needed to go be evaluated this evening, either at an urgent care or emergency room.  Patient was not willing to do that.  She states she is watching her 80-year-old father with dementia, and cannot leave him.  I explained I would give her a prescription for ten 500 mg Robaxin, with no refills.  She agrees to see Dr. Conner or one of the PAs as soon as possible.  If her pain is not resolving, or if it worsens, she is aware that she needs to be seen for evaluation this weekend.  She is aware that the recommendation was for her to be urgently evaluated, but she did refuse that.    Ney Jerry MD  [facility]

## 2023-02-18 RX ORDER — LISINOPRIL 20 MG/1
TABLET ORAL
Qty: 90 TABLET | Refills: 0 | Status: SHIPPED | OUTPATIENT
Start: 2023-02-18 | End: 2023-04-23

## 2023-02-20 ENCOUNTER — OFFICE VISIT (OUTPATIENT)
Dept: INTERNAL MEDICINE | Facility: CLINIC | Age: 53
End: 2023-02-20
Payer: COMMERCIAL

## 2023-02-20 VITALS
SYSTOLIC BLOOD PRESSURE: 116 MMHG | WEIGHT: 210 LBS | HEIGHT: 63 IN | OXYGEN SATURATION: 99 % | HEART RATE: 89 BPM | BODY MASS INDEX: 37.21 KG/M2 | TEMPERATURE: 98 F | DIASTOLIC BLOOD PRESSURE: 70 MMHG

## 2023-02-20 DIAGNOSIS — K75.81 NASH (NONALCOHOLIC STEATOHEPATITIS): Chronic | ICD-10-CM

## 2023-02-20 DIAGNOSIS — J45.30 MILD PERSISTENT ASTHMA WITHOUT COMPLICATION: ICD-10-CM

## 2023-02-20 DIAGNOSIS — G89.29 CHRONIC LEFT SHOULDER PAIN: ICD-10-CM

## 2023-02-20 DIAGNOSIS — F31.62 BIPOLAR AFFECTIVE DISORDER, MIXED, MODERATE (H): ICD-10-CM

## 2023-02-20 DIAGNOSIS — F33.0 MILD EPISODE OF RECURRENT MAJOR DEPRESSIVE DISORDER (H): ICD-10-CM

## 2023-02-20 DIAGNOSIS — M25.512 CHRONIC LEFT SHOULDER PAIN: ICD-10-CM

## 2023-02-20 DIAGNOSIS — J44.9 COPD, MODERATE (H): ICD-10-CM

## 2023-02-20 DIAGNOSIS — E66.01 SEVERE OBESITY (BMI 35.0-35.9 WITH COMORBIDITY) (H): ICD-10-CM

## 2023-02-20 DIAGNOSIS — Z01.818 PREOP GENERAL PHYSICAL EXAM: Primary | ICD-10-CM

## 2023-02-20 DIAGNOSIS — R73.03 PREDIABETES: ICD-10-CM

## 2023-02-20 PROBLEM — R10.84 GENERALIZED ABDOMINAL PAIN: Status: ACTIVE | Noted: 2023-02-20

## 2023-02-20 PROBLEM — F33.9 MAJOR DEPRESSION, RECURRENT (H): Status: ACTIVE | Noted: 2023-02-20

## 2023-02-20 PROBLEM — K59.00 CONSTIPATION: Status: ACTIVE | Noted: 2023-02-20

## 2023-02-20 PROBLEM — Z86.0100 HISTORY OF COLON POLYPS: Status: ACTIVE | Noted: 2023-02-20

## 2023-02-20 PROCEDURE — 99214 OFFICE O/P EST MOD 30 MIN: CPT | Performed by: INTERNAL MEDICINE

## 2023-02-20 ASSESSMENT — PAIN SCALES - GENERAL: PAINLEVEL: SEVERE PAIN (7)

## 2023-02-20 ASSESSMENT — ASTHMA QUESTIONNAIRES: ACT_TOTALSCORE: 20

## 2023-02-20 NOTE — TELEPHONE ENCOUNTER
Patient requesting RX:     Albuterol Ventolin,   Solution,   And the Nebulizer,     Symbicort    Pharmacy: [facility] in [place].

## 2023-02-20 NOTE — PATIENT INSTRUCTIONS
PRE-OPERATIVE INSTRUCTIONS:     *  Contact your surgeon if there is any change in your health. This includes signs of new infection, such as cold or flu (such as a sore throat, runny nose, cough, rash or fever).  Elective surgeries may need to be postponed if there is significant illness present.    *  Confirm any Covid testing requirements before your procedure.   Be aware that each surgery facility has their specific Covid testing requirements.  Although most facilities no longer require mandatory Covid testing, please be sure to know if there are any sort of Covid testing requirements for you. Typically the surgeon's office is responsible for arranging and completing this testing before surgery.    Follow any instructions you have been given.  Contact the surgery office or surgery center for questions about Covid testing requirements.      *  Covid testing may be performed at most [facility] if needed.     --[facility] Covid Scheduling number: [phone]   (to arrange Covid testing and/or Covid vaccine appointments within the [facility] system)      *  Stop aspirin of any kind for 7 days before procedure.   (even low dose daily aspirin).    *  No NSAIDs (Motrin, Advil, ibuprofen, Aleve, etc) within 5-7 days of surgery.    *  Stop any Fish Oil or multi vitamin (and specifically anything containing vitamin E) supplements for 7 days prior to surgery because these can affect platelet function.      *  Tylenol (acetaminophen) is OK to take if needed, this medication has no effect on platelet function.  Follow instructions on the bottle    *  Prepare your body as instructed by the surgery clinic.  If instructed, Take a shower or bath the night before surgery. Use any special soaps or cleaning instructions according to instructions from your surgeon.  If you do not have soap from your surgeon, use your regular soap. Do not shave or scrub the surgery site.  Wear clean pajamas and have  clean sheets on your bed     *  ON THE MORNING OF SURGERY:     --Do NOT take metformin or Rybelsus      --Do NOT take Lisinopril      --OK to take all other medications with small sip of water.     *  Resume all medications at the same doses after surgery, unless instructed otherwise by the medical staff.     *  Attend all follow up appointments with the surgeons (and/or therapists if applicable) as instructed.     *  Contact the surgeon's office for any specific questions about after-surgery cares and follow up instructions.      *  You do not need to necessarily return to see us after your surgery unless instructed to do so.

## 2023-02-20 NOTE — PROGRESS NOTES
[address] [address]  Phone: [phone]  Primary Provider: Roro Chawla  Pre-op Performing Provider: FEDE MCINTOSH      PREOPERATIVE EVALUATION:  Today's date: 2/20/2023    Swetha Patterson is a 52 year old female who presents for a preoperative evaluation.    Surgical Information:  Surgery/Procedure:ANESTHESIA OUT OF OR MRI  (Lipoma of left shoulder DX by Dr. Jerry)  Surgery Location: [facility]  Surgeon: GENERIC ANESTHESIA PROVIDER  Surgery Date: 3/9/2023  Time of Surgery: 12:30 PM  Where patient plans to recover: At home with family  Fax number for surgical facility: Note does not need to be faxed, will be available electronically in Epic.    Type of Anesthesia Anticipated: other    Assessment & Plan     The proposed surgical procedure is considered LOW risk.    1. Preop general physical exam    2. Chronic left shoulder pain    3. COPD, moderate (H)    4. Severe obesity (BMI 35.0-35.9 with comorbidity) (H)    5. Mild persistent asthma without complication    6. Mild episode of recurrent major depressive disorder (H)    7. DIAZ (nonalcoholic steatohepatitis)    8. Prediabetes    9. Bipolar affective disorder, mixed, moderate (H)                Risks and Recommendations:  The patient has the following additional risks and recommendations for perioperative complications:  Pulmonary:     --asthma/COPD stable on current regimen.   --obsrve for bronchospasm post op, provide albuterol via nebulizer if needed.     Medication Instructions:   - ACE/ARB: HOLD due to exceptional risk of hypotension during surgery.    - metformin: HOLD day of surgery.   - GLP-1 Injectable (exenitide, liraglutide, semaglutide, dulaglutide, etc.): HOLD day of surgery    - SSRIs, SNRIs, TCAs, Antipsychotics: Continue without modification.    - LABA, inhaled corticosteroid, long-acting anticholinergics: Continue without modification.   -  rescue Inhaler: Continue PRN. Bring to hospital on the day of surgery.    RECOMMENDATION:  APPROVAL GIVEN to proceed with proposed procedure, without further diagnostic evaluation.            Subjective     HPI related to upcoming procedure: chronci shoulder pain, has very severe anxiety/claustrophobia, requires general anesthesia to get MRI.     Preop Questions 2/17/2023   1. Have you ever had a heart attack or stroke? No   2. Have you ever had surgery on your heart or blood vessels, such as a stent placement, a coronary artery bypass, or surgery on an artery in your head, neck, heart, or legs? No   3. Do you have chest pain with activity? No   4. Do you have a history of  heart failure? No   5. Do you currently have a cold, bronchitis or symptoms of other infection? No   6. Do you have a cough, shortness of breath, or wheezing? No   7. Do you or anyone in your family have previous history of blood clots? No   8. Do you or does anyone in your family have a serious bleeding problem such as prolonged bleeding following surgeries or cuts? No   9. Have you ever had problems with anemia or been told to take iron pills? No   10. Have you had any abnormal blood loss such as black, tarry or bloody stools, or abnormal vaginal bleeding? No   11. Have you ever had a blood transfusion? No   12. Are you willing to have a blood transfusion if it is medically needed before, during, or after your surgery? Yes   13. Have you or any of your relatives ever had problems with anesthesia? No   14. Do you have sleep apnea, excessive snoring or daytime drowsiness? No   15. Do you have any artifical heart valves or other implanted medical devices like a pacemaker, defibrillator, or continuous glucose monitor? No   16. Do you have artificial joints? No   17. Are you allergic to latex? No   18. Is there any chance that you may be pregnant? No       Health Care Directive:  Patient does not have a Health Care Directive or Living Will:      Preoperative Review of :   reviewed - controlled substances reflected in medication list.        *  Recent viral URI 3 weeks ago, symptoms are all gone and she has completely recovered.     *  No recent cardiac or pulmonary issues or symptoms.    *  No problems performing vigorous physical activity, no changes in exercise tolerance.    *  No personal or family history of anesthesia complications.    *  No personal or family history of bleeding or clotting disorders.         Asthma stable.  Has not had any recent breathing troubles beyond usual baseline.  Has not any recent acute respiratory events.  Using medication as directed with reported side effects    ACT Total Scores 4/13/2021 5/25/2021 2/20/2023   ACT TOTAL SCORE - - -   ASTHMA ER VISITS - - -   ASTHMA HOSPITALIZATIONS - - -   ACT TOTAL SCORE (Goal Greater than or Equal to 20) 7 - 20   In the past 12 months, how many times did you visit the emergency room for your asthma without being admitted to the hospital? 0 1 0   In the past 12 months, how many times were you hospitalized overnight because of your asthma? 0 1 0       Hypertension:  History of hypertension, on medication.  No reported side effects from medications.    Reviewed last 6 BP readings in chart:  BP Readings from Last 6 Encounters:   02/20/23 116/70   02/01/23 137/82   01/27/23 100/69   01/24/23 115/68   01/20/23 111/67   01/13/23 113/73     No active cardiac complaints or symptoms with exercise.       The patient has a history of impaired glucose tolerance with regularly elevated blood sugars.    They have not been diagnosed with type II DM or placed on medications for diabetes before.   They deny polyuria, polydipsia.     The patient is obese with a BMI of Body mass index is 37.2 kg/m ..    Review of current labs show:    Lab Results   Component Value Date    A1C 5.5 08/17/2022    A1C 5.8 01/24/2022    A1C 5.6 10/16/2020    A1C 6.0 02/20/2020    A1C 5.6 05/10/2019    A1C 5.4  03/21/2013     The patient has had a history of ongoing obesity.  Reviewed the weigth curves.   Their current BMI is:  Body mass index is 37.2 kg/m .  Reviewed previous attempts at weight loss which have not been successful in producing prolonged weigth loss.   Discussed current eating and exercise habits.     Reviewed weights in chart:  Wt Readings from Last 10 Encounters:   02/20/23 95.3 kg (210 lb)   02/01/23 95.8 kg (211 lb 3.2 oz)   01/27/23 95.9 kg (211 lb 8 oz)   01/24/23 90.7 kg (200 lb)   01/20/23 90.7 kg (200 lb)   01/13/23 97.2 kg (214 lb 3.2 oz)   11/29/22 91.2 kg (201 lb)   10/24/22 90.3 kg (199 lb)   10/12/22 97.1 kg (214 lb)   09/08/22 97.5 kg (215 lb)        Review of Systems  Constitutional, neuro, ENT, endocrine, pulmonary, cardiac, gastrointestinal, genitourinary, musculoskeletal, integument and psychiatric systems are negative, except as otherwise noted.    Patient Active Problem List    Diagnosis Date Noted     History of colon polyps 02/20/2023     Priority: Medium     Constipation 02/20/2023     Priority: Medium     Generalized abdominal pain 02/20/2023     Priority: Medium     Mild persistent asthma without complication 02/20/2023     Priority: Medium     Prediabetes 02/20/2023     Priority: Medium     Major depression, recurrent (H) 02/20/2023     Priority: Medium     Bipolar affective disorder, mixed, moderate (H) 02/20/2023     Priority: Medium     Change in bowel movement 11/22/2021     Priority: Medium     Nausea 11/22/2021     Priority: Medium     Rectal pain 11/22/2021     Priority: Medium     Severe obesity (BMI 35.0-35.9 with comorbidity) (H) 08/31/2020     Priority: Medium     1/22/22 BMI 43.75 (246 lbs)  2/20/2023 BMI 37.20 (210 lbs)       Claudication of both lower extremities (H) 03/06/2020     Priority: Medium     PAD (peripheral artery disease) (H) 03/06/2020     Priority: Medium     Jaw claudication 03/06/2020     Priority: Medium     Added automatically from request for  surgery [id number]       Anorectal disorder 08/23/2019     Priority: Medium     HTN, goal below 140/90 01/12/2019     Priority: Medium     Digestive symptom 09/18/2018     Priority: Medium     Vocal cord polyp 06/28/2018     Priority: Medium     Status post cervical spinal fusion 05/01/2018     Priority: Medium     Steatosis of liver 10/09/2017     Priority: Medium     Epigastric pain 09/26/2017     Priority: Medium     Diaphragmatic hernia 09/22/2017     Priority: Medium     DIAZ (nonalcoholic steatohepatitis) 06/17/2016     Priority: Medium     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  Doesn't drink, but relapsed (doesn't remember).  Was on Valium for 4 years (until October 2017). Xanax since 11/2017.  Managed by hospital.        Past Medical History:   Diagnosis Date     Anxiety state, unspecified      Arthritis      Asthma      Chronic pain     back pain from cyst     Contact dermatitis and other eczema, due to unspecified cause      COPD (chronic obstructive pulmonary disease) (H)      Depressive disorder      Depressive disorder, not elsewhere classified      Diabetes (H)      Emphysema with chronic bronchitis (H)      Esophageal reflux      Family history of ischemic heart disease      Fibromyalgia      Gastro-oesophageal reflux disease      Heart disease     has chest pain sometimes     History of emphysema      Hoarseness      HTN, goal below 140/90      Hyperlipidemia LDL goal <130       "Liver disease     \"fatty liver\"     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)      PONV (postoperative nausea and vomiting)      Rectal bleeding      Thyroid disease      Past Surgical History:   Procedure Laterality Date     ABDOMEN SURGERY       ANESTHESIA OUT OF OR MRI N/A 10/07/2021    Procedure: ANESTHESIA OUT OF OR MRI;  Surgeon: GENERIC ANESTHESIA PROVIDER;  Location: [facility] OR     ARTHROSCOPY SHOULDER DECOMPRESSION Left 10/21/2015    Procedure: ARTHROSCOPY SHOULDER DECOMPRESSION;  Surgeon: Julien Milian MD;  Location: [facility] OR     BIOPSY ARTERY TEMPORAL Left 03/11/2020    Procedure: LEFT TEMPORAL ARTERY BIOPSY;  Surgeon: Ibeth Conner MD;  Location: [facility]     BRONCHIAL THERMOPLASTY N/A 11/14/2014    Procedure: BRONCHIAL THERMOPLASTY;  Surgeon: Ward Whitaker MD;  [facility]: [facility]     BRONCHIAL THERMOPLASTY N/A 12/19/2014    Procedure: BRONCHIAL THERMOPLASTY;  Surgeon: Ward Whitaker MD;  [facility]     BRONCHIAL THERMOPLASTY N/A 02/06/2015    Procedure: BRONCHIAL THERMOPLASTY;  Surgeon: Ward Whitaker MD;  [facility]     COLONOSCOPY N/A 11/26/2018    Procedure: COLONOSCOPY ([place]);  Surgeon: Marshall Oakes MD;  Location: [facility]     COLONOSCOPY N/A 10/22/2020    Procedure: Colonoscopy;  Surgeon: Marshall Mccormick MD;  Location:  OR     COLONOSCOPY N/A 1/20/2023    Procedure: COLONOSCOPY, FLEXIBLE, WITH LESION REMOVAL USING SNARE;  Surgeon: Carson Domínguez MD;  Location:  GI     DISCECTOMY, FUSION CERVICAL ANTERIOR ONE LEVEL, COMBINED N/A 05/01/2018    Procedure: COMBINED DISCECTOMY, FUSION CERVICAL ANTERIOR ONE LEVEL;  1.  C5-C6 anterior cervical diskectomy and fusion.    2.  C5-C6 application of intervertebral biomechanical device for interbody fusion purposes.    3.  C5-C6 anterior instrumentation using the standard Kristin InViZia 24 mm plate with four associated bone screws, 12 mm in length.  Inferior screws are fixed.  Superior screws are va     ENT " SURGERY  2015    polyps removed from vocal cords      ESOPHAGOSCOPY, GASTROSCOPY, DUODENOSCOPY (EGD), COMBINED N/A 10/22/2020    Procedure: Esophagoscopy, gastroscopy, duodenoscopy with biopsies;  Surgeon: Marshall Mccormick MD;  Location:  OR     ESOPHAGOSCOPY, GASTROSCOPY, DUODENOSCOPY (EGD), COMBINED N/A 06/17/2021    Procedure: ESOPHAGOGASTRODUODENOSCOPY, WITH BIOPSY;  Surgeon: Darrel Damon MD;  Location:  GI     EXCISE MASS NECK Left 2/1/2023    Procedure: exploration of  NECK left;  Surgeon: Ibeth Conner MD;  Location:  OR     EXCISE MASS TRUNK Left 11/03/2021    Procedure: EXCISION LEFT SHOULDER LIPOMA;  Surgeon: Ibeth Conner MD;  Location:  OR     EXCISE NODE MEDIASTINAL  04/26/2013    Procedure: EXCISE NODE MEDIASTINAL;;  Surgeon: Av Peña MD;  Location:  OR     HEAD & NECK SURGERY  2018    Had my five and six fused in my neck     LAPAROSCOPIC CHOLECYSTECTOMY N/A 10/19/2017    Procedure: LAPAROSCOPIC CHOLECYSTECTOMY;  LAPAROSCOPIC CHOLECYSTECTOMY;  Surgeon: Ney Jerry MD;  Location:  OR     THORACOSCOPY  04/26/2013    Procedure: THORACOSCOPY;  LEFT VIDEO ASSISTED THORACOSCOPY, RESECTION OF POSTERIOR MEDIASTINAL MASS;  Surgeon: Av Peña MD;  Location:  OR     TONSILLECTOMY  as a kid     TONSILLECTOMY       ZZC APPENDECTOMY  at age 17     Current Outpatient Medications   Medication Sig Dispense Refill     acetaminophen (TYLENOL) 500 MG tablet Take 2 tablets (1,000 mg) by mouth every 6 hours as needed for pain       albuterol (PROVENTIL) (2.5 MG/3ML) 0.083% neb solution INHALE ONE VIAL BY NEBULIZER EVERY 6 HOURS AS NEEDED FOR SHORTNESS OF BREATH OR WHEEZING 75 mL 3     albuterol (VENTOLIN HFA) 108 (90 Base) MCG/ACT inhaler Inhale 2 puffs into the lungs every 6 hours 18 g 0     amLODIPine (NORVASC) 2.5 MG tablet Take 2 tablets (5 mg) by mouth daily 180 tablet 0     atorvastatin (LIPITOR) 80 MG tablet TAKE ONE TABLET BY MOUTH EVERY DAY 90  tablet 3     benzoyl peroxide (ACNE-CLEAR) 10 % external gel Apply topically 2 times daily as needed 90 g 1     blood glucose (ACCU-CHEK ALLYSON PLUS) test strip USE TO TEST BLOOD SUGAR ONCE DAILY OR AS DIRECTED 100 strip 3     blood glucose (NO BRAND SPECIFIED) lancets standard Use to test blood sugar 1 times daily or as directed. 100 each 3     blood glucose (NO BRAND SPECIFIED) test strip Use to test blood sugar 1 times daily or as directed.  Dispense Accu-chek Allyson Plus or per patient insurance 100 strip 3     blood glucose monitoring (NO BRAND SPECIFIED) meter device kit Use to test blood sugar 1 times daily or as directed.  Dispense Accu-chek Allyson Plus or per insurance preference 1 kit 0     blood glucose monitoring (NO BRAND SPECIFIED) meter device kit Use to test blood sugar 1 times daily or as directed. 1 kit 0     budesonide-formoterol (SYMBICORT) 160-4.5 MCG/ACT Inhaler Inhale 2 puffs into the lungs 2 times daily 30.6 g 3     buPROPion (WELLBUTRIN XL) 150 MG 24 hr tablet TAKE ONE TABLET BY MOUTH EVERY MORNING 90 tablet 1     cetirizine (ZYRTEC) 10 MG tablet TAKE ONE TABLET BY MOUTH EVERY DAY AS NEEDED 90 tablet 0     clindamycin (CLINDAMAX) 1 % external gel Apply topically 2 times daily 60 g 3     clonazePAM (KLONOPIN) 0.5 MG tablet Take 0.5 mg by mouth daily as needed        clonazePAM (KLONOPIN) 1 MG tablet Take 1 tablet (1 mg) by mouth 2 times daily as needed for anxiety She needs to see her PCP to get more tablets 5 tablet 0     doxycycline hyclate (VIBRAMYCIN) 100 MG capsule Take 1 capsule (100 mg) by mouth 2 times daily 20 capsule 0     Emollient (CERAVE MOISTURIZING) CREA Externally apply 1 Application topically 2 times daily 453 g 11     fluticasone (FLONASE) 50 MCG/ACT nasal spray Spray 2 sprays in nostril daily 16 g 5     furosemide (LASIX) 20 MG tablet TAKE ONE TABLET BY MOUTH TWICE A  tablet 3     hydrocortisone 2.5 % cream APPLY TO AFFECTED AREA(S) TWO TIMES A DAY 28.35 g 1      hydrOXYzine (ATARAX) 50 MG tablet TAKE TWO TABLETS BY MOUTH THREE TIMES A DAY AS NEEDED FOR ANXIETY 70 tablet 9     ibuprofen (ADVIL/MOTRIN) 200 MG tablet Take 3 tablets (600 mg) by mouth every 6 hours as needed for moderate pain (4-6)       lamoTRIgine (LAMICTAL) 150 MG tablet Take 150 mg by mouth 2 times daily       levothyroxine (SYNTHROID/LEVOTHROID) 50 MCG tablet Take 1 tablet (50 mcg) by mouth daily 90 tablet 1     lisinopril (ZESTRIL) 20 MG tablet TAKE ONE TABLET BY MOUTH EVERY DAY 90 tablet 0     metFORMIN (GLUCOPHAGE) 500 MG tablet TAKE ONE TABLET BY MOUTH EVERY DAY WITH BREAKFAST 90 tablet 0     methocarbamol (ROBAXIN) 500 MG tablet Take 1 tablet (500 mg) by mouth 4 times daily as needed for muscle spasms 10 tablet 0     minoxidil (ROGAINE) 5 % external solution apply per package directions to the scalp once daily 120 mL 3     montelukast (SINGULAIR) 10 MG tablet TAKE ONE TABLET BY MOUTH AT BEDTIME 90 tablet 1     multivitamin, therapeutic (THERA-VIT) TABS tablet Take 1 tablet by mouth daily       naltrexone (DEPADE/REVIA) 50 MG tablet Take 1/2 tablet once daily 1-2 hours prior to worst cravings for 1 week, then increase to 1 tablet daily as directed if tolerating 30 tablet 3     nitroGLYcerin (NITROSTAT) 0.4 MG sublingual tablet PLACE ONE TABLET UNDER THE TONGUE AT THE 1ST SIGN OF ATTACK. IF PAIN IS UNRELIEVED OR WORSENED 5 MINUTES AFTER 1ST DOSE, PROMPT MEDICAL ASSISTANCE IS NEEDED. MAY REPEAT EVERY 5 MINUTES UNTIL PAIN IS RELIEVED. MAX OF THREE DOSES 25 tablet 11     nystatin (MYCOSTATIN) 314770 UNIT/GM external powder APPLY TOPICALLY TWICE A DAY AS NEEDED SKIN RASH 45 g 9     omeprazole (PRILOSEC) 20 MG DR capsule TAKE ONE CAPSULE BY MOUTH TWICE A  capsule 1     ondansetron (ZOFRAN-ODT) 4 MG ODT tab Take 1 tablet (4 mg) by mouth every 6 hours as needed for nausea 12 tablet 1     permethrin (LICE TREATMENT CREME RINSE) 1 % external liquid Apply topically from the neck down, leave on overnight  (approx. 8 hours) and repeat 1 weeks later. 120 mL 1     polyethylene glycol (MIRALAX) 17 GM/Dose powder DISSOLVE 17 GRAMS (1 CAPFUL) AND DRINK BY MOUTH ONCE DAILY Strength: 17 GM/SCOOP 510 g 1     RYBELSUS 14 MG TABS TAKE ONE TABLET BY MOUTH EVERY DAY 30 tablet 3     sucralfate (CARAFATE) 1 GM tablet Take 1 tablet (1 g) by mouth 4 times daily as needed for nausea 120 tablet 1     sulfamethoxazole-trimethoprim (BACTRIM DS) 800-160 MG tablet Take 1 tablet by mouth 2 times daily Take one tablet twice daily. For additional refills, please schedule a follow-up appointment. 180 tablet 1     terbinafine (LAMISIL) 1 % external cream APPLY TO AFFECTED AREA(S) TWO TIMES A DAY 30 g 2     triamcinolone (KENALOG) 0.025 % external ointment Apply topically 2 times daily 80 g 1     Vitamin D3 (CHOLECALCIFEROL) 125 MCG (5000 UT) tablet Take 1 tablet by mouth three times a week         Allergies   Allergen Reactions     Codeine Nausea and Vomiting     vomiting     Cyclobenzaprine Nausea     Gabapentin Rash     Hydrocodone Nausea and Vomiting     Sulfa Drugs Nausea and Vomiting     Nausea        Social History     Tobacco Use     Smoking status: Former     Types: Other     Smokeless tobacco: Former     Quit date: 2/13/2022     Tobacco comments:     Quit smoking prior to bariatric surgery and because of my COPD   Substance Use Topics     Alcohol use: Not Currently     Family History   Problem Relation Age of Onset     Heart Disease Mother         murmur, mi     Hypertension Mother      Cerebrovascular Disease Mother      Depression Mother      Gallbladder Disease Mother      Asthma Mother      Thyroid Disease Mother         And my mother had it also     Obesity Mother         And myself     Family History Negative Father         does not know  him     Family History Negative Brother      Heart Disease Maternal Grandfather      Asthma Maternal Grandfather      Hypertension Maternal Grandfather      Cerebrovascular Disease Maternal  "Grandfather      Diabetes Maternal Grandfather      Asthma Sister      Hypertension Sister      Cerebrovascular Disease Sister      Hyperlipidemia Sister      Anxiety Disorder Sister      Hypertension Maternal Grandmother      Cerebrovascular Disease Maternal Grandmother      Hypertension Other         Rukhsana is my sister Jigna is my mom     History   Drug Use No         Objective     /70   Pulse 89   Temp 98  F (36.7  C) (Oral)   Ht 1.6 m (5' 3\")   Wt 95.3 kg (210 lb)   LMP 04/15/2016 (Exact Date)   SpO2 99%   Breastfeeding No   BMI 37.20 kg/m      Physical Exam  GENERAL alert and no distress  EYES:  Normal sclera,conjunctiva, EOMI  HENT: oral and posterior pharynx without lesions or erythema, facies symmetric  NECK: Neck supple. No LAD, without thyroidmegaly.  RESP: Clear to ausculation bilaterally without wheezes or crackles. Normal BS in all fields.  CV: RRR normal S1S2 without murmurs, rubs or gallops.  LYMPH: no cervical lymph adenopathy appreciated  MS: extremities- no gross deformities of the visible extremities noted,   EXT:  no lower extremity edema  PSYCH: Alert and oriented times 3; speech- coherent  SKIN:  No obvious significant skin lesions on visible portions of face     Recent Labs   Lab Test 02/01/23  1414 02/01/23  1107 12/23/22  1937 08/17/22  0929 01/24/22  1403   HGB  --   --  11.7 13.1 13.9   PLT  --   --  297 339 379   NA  --   --  138 139 135   POTASSIUM  --  4.8 4.2 4.0 4.0   CR 1.08*  --  0.96* 0.80 0.85   A1C  --   --   --  5.5 5.8*        Diagnostics:  No labs were ordered during this visit.     No EKG required for low risk surgery (cataract, skin procedure, breast biopsy, etc).    Revised Cardiac Risk Index (RCRI):  The patient has the following serious cardiovascular risks for perioperative complications:   - No serious cardiac risks = 0 points     RCRI Interpretation: 0 points: Class I (very low risk - 0.4% complication rate)           Signed Electronically by: " Chicho Bryan MD  Copy of this evaluation report is provided to requesting physician.

## 2023-02-21 RX ORDER — ALBUTEROL SULFATE 0.83 MG/ML
SOLUTION RESPIRATORY (INHALATION)
Qty: 75 ML | Refills: 1 | Status: SHIPPED | OUTPATIENT
Start: 2023-02-21 | End: 2024-03-11

## 2023-02-21 RX ORDER — ALBUTEROL SULFATE 90 UG/1
2 AEROSOL, METERED RESPIRATORY (INHALATION) EVERY 6 HOURS
Qty: 18 G | Refills: 0 | Status: SHIPPED | OUTPATIENT
Start: 2023-02-21 | End: 2023-04-23

## 2023-02-21 RX ORDER — BUDESONIDE AND FORMOTEROL FUMARATE DIHYDRATE 160; 4.5 UG/1; UG/1
2 AEROSOL RESPIRATORY (INHALATION) 2 TIMES DAILY
Qty: 30.6 G | Refills: 1 | Status: SHIPPED | OUTPATIENT
Start: 2023-02-21 | End: 2024-03-06

## 2023-02-22 ENCOUNTER — TELEPHONE (OUTPATIENT)
Dept: INTERNAL MEDICINE | Facility: CLINIC | Age: 53
End: 2023-02-22
Payer: COMMERCIAL

## 2023-02-23 ENCOUNTER — TELEPHONE (OUTPATIENT)
Dept: INTERNAL MEDICINE | Facility: CLINIC | Age: 53
End: 2023-02-23

## 2023-02-23 NOTE — TELEPHONE ENCOUNTER
Home Health Care Cert and Plan of care from [facility] for dates 12/30/22-2/27/23    Forms to your box for review and signature

## 2023-02-27 NOTE — TELEPHONE ENCOUNTER
Central Prior Authorization Team   Phone: [phone]      PA Initiation    Medication: budesonide-formoterol (SYMBICORT) 160-4.5 MCG/ACT Inhaler-PA NOT NEEDED  Insurance Company: [employer] - Phone [phone] Fax [phone]  Pharmacy Filling the Rx: [facility] [address] [place]  [place] - [address]  Filling Pharmacy Phone: [phone]  Filling Pharmacy Fax:    Start Date: 2/27/2023    Insurance prefers Brand, called pharmacy, they switched and received a paid claim.  **Instructed pharmacy to notify patient when script is ready to /ship.**

## 2023-03-01 ENCOUNTER — HOSPITAL ENCOUNTER (OUTPATIENT)
Facility: CLINIC | Age: 53
End: 2023-03-01
Payer: COMMERCIAL

## 2023-03-02 ENCOUNTER — TELEPHONE (OUTPATIENT)
Dept: ENDOCRINOLOGY | Facility: CLINIC | Age: 53
End: 2023-03-02

## 2023-03-02 NOTE — TELEPHONE ENCOUNTER
Unable to reach pt for appointment.     Call not able to be completed and call was ended before reaching a VM.Tried 3x. Sent [employer] message as well.     SHELLY Henning, RD, LD  Clinic #: [phone]

## 2023-03-03 DIAGNOSIS — Z53.9 DIAGNOSIS NOT YET DEFINED: Primary | ICD-10-CM

## 2023-03-06 ENCOUNTER — TELEPHONE (OUTPATIENT)
Dept: MEDSURG UNIT | Facility: CLINIC | Age: 53
End: 2023-03-06
Payer: COMMERCIAL

## 2023-03-10 ENCOUNTER — TELEPHONE (OUTPATIENT)
Dept: INTERNAL MEDICINE | Facility: CLINIC | Age: 53
End: 2023-03-10
Payer: COMMERCIAL

## 2023-03-10 NOTE — TELEPHONE ENCOUNTER
HOME HEALTH CERTIFICATION 2/28/23 - 4/28/23; received via fax. Orders/Forms/Letters in your mailbox to be signed.

## 2023-03-13 ENCOUNTER — TELEPHONE (OUTPATIENT)
Dept: INTERNAL MEDICINE | Facility: CLINIC | Age: 53
End: 2023-03-13
Payer: COMMERCIAL

## 2023-03-13 DIAGNOSIS — E66.813 CLASS 3 SEVERE OBESITY DUE TO EXCESS CALORIES WITH SERIOUS COMORBIDITY AND BODY MASS INDEX (BMI) OF 40.0 TO 44.9 IN ADULT (H): ICD-10-CM

## 2023-03-13 DIAGNOSIS — E66.01 CLASS 3 SEVERE OBESITY DUE TO EXCESS CALORIES WITH SERIOUS COMORBIDITY AND BODY MASS INDEX (BMI) OF 40.0 TO 44.9 IN ADULT (H): ICD-10-CM

## 2023-03-13 NOTE — TELEPHONE ENCOUNTER
SKILLED NURSING Missed Visit Report; received via fax. Orders/Forms/Letters in your mailbox to be signed.

## 2023-03-14 DIAGNOSIS — Z53.9 DIAGNOSIS NOT YET DEFINED: Primary | ICD-10-CM

## 2023-03-14 PROCEDURE — G0179 MD RECERTIFICATION HHA PT: HCPCS | Performed by: INTERNAL MEDICINE

## 2023-03-20 ENCOUNTER — HOSPITAL ENCOUNTER (OUTPATIENT)
Facility: CLINIC | Age: 53
End: 2023-03-20
Payer: COMMERCIAL

## 2023-03-22 ENCOUNTER — TELEPHONE (OUTPATIENT)
Dept: MEDSURG UNIT | Facility: CLINIC | Age: 53
End: 2023-03-22
Payer: COMMERCIAL

## 2023-03-23 RX ORDER — NALTREXONE HYDROCHLORIDE 50 MG/1
TABLET, FILM COATED ORAL
OUTPATIENT
Start: 2023-03-23

## 2023-03-27 ENCOUNTER — TELEPHONE (OUTPATIENT)
Dept: ENDOCRINOLOGY | Facility: CLINIC | Age: 53
End: 2023-03-27
Payer: COMMERCIAL

## 2023-03-27 NOTE — TELEPHONE ENCOUNTER
Unable to LVM, sent mychart      Schedule a follow up appt with Rashmi Del Rio, reason: pre surg. med refill.     Next available appt. Left call center #   [phone] from 144 on 10/13  Continue Biktarvy [phone] from 144 on 10/13  Continue Biktarvy  No need for PCP prophylaxis at this time

## 2023-03-28 ENCOUNTER — TELEPHONE (OUTPATIENT)
Dept: INTERNAL MEDICINE | Facility: CLINIC | Age: 53
End: 2023-03-28

## 2023-03-28 DIAGNOSIS — E66.813 CLASS 3 SEVERE OBESITY DUE TO EXCESS CALORIES WITH SERIOUS COMORBIDITY AND BODY MASS INDEX (BMI) OF 40.0 TO 44.9 IN ADULT (H): ICD-10-CM

## 2023-03-28 DIAGNOSIS — E66.01 CLASS 3 SEVERE OBESITY DUE TO EXCESS CALORIES WITH SERIOUS COMORBIDITY AND BODY MASS INDEX (BMI) OF 40.0 TO 44.9 IN ADULT (H): ICD-10-CM

## 2023-03-28 RX ORDER — NALTREXONE HYDROCHLORIDE 50 MG/1
TABLET, FILM COATED ORAL
Qty: 30 TABLET | Refills: 3 | Status: CANCELLED | OUTPATIENT
Start: 2023-03-28

## 2023-03-28 NOTE — TELEPHONE ENCOUNTER
"Patient is calling to ask if Dr Low will prescribe naltrexone to help with her cravings of food and cigarettes. The weight loss clinic as previously prescribed this but they dont want to right now \"until they figure out what is going on with her stomach\"  "

## 2023-03-31 NOTE — TELEPHONE ENCOUNTER
Attempted to call patient and there was an automated message stating:   at the subscribers request, this phone does not accept incoming calls.

## 2023-04-02 ENCOUNTER — HEALTH MAINTENANCE LETTER (OUTPATIENT)
Age: 53
End: 2023-04-02

## 2023-04-05 ENCOUNTER — TELEPHONE (OUTPATIENT)
Dept: INTERNAL MEDICINE | Facility: CLINIC | Age: 53
End: 2023-04-05
Payer: COMMERCIAL

## 2023-04-05 NOTE — TELEPHONE ENCOUNTER
Patient calls, started to develop COPD flare-up 2 days ago and feels like symptoms are getting worse today. Patient is coughing frequently and chest is feeling tighter.   No fever present. She is using inhaler more often and needed 2 neb treatments yesterday. She states Dr. Low will usually order Prednisone and levofloxacin when this happens and request prescriptions be sent to the pharmacy.     Yesterday she started to have vaginal irritation, concerned about yeast infection. No vaginal discharge or odor present. Patient states that she did take a bubble bath yesterday and concerned this may have triggered infection. Patient asks if she can get prescription for Diflucan as well.     Glucose has also been elevated for about 10 days, fasting today 169. She does have appointment with Dr. Low on 4/19, asks if she needs to do anything sooner for elevated glucose readings.     Brenda Franks RN  [facility]

## 2023-04-06 ENCOUNTER — TELEPHONE (OUTPATIENT)
Dept: MEDSURG UNIT | Facility: CLINIC | Age: 53
End: 2023-04-06
Payer: COMMERCIAL

## 2023-04-10 ENCOUNTER — TELEPHONE (OUTPATIENT)
Dept: INTERNAL MEDICINE | Facility: CLINIC | Age: 53
End: 2023-04-10

## 2023-04-10 ENCOUNTER — OFFICE VISIT (OUTPATIENT)
Dept: URGENT CARE | Facility: URGENT CARE | Age: 53
End: 2023-04-10
Payer: COMMERCIAL

## 2023-04-10 ENCOUNTER — NURSE TRIAGE (OUTPATIENT)
Dept: INTERNAL MEDICINE | Facility: CLINIC | Age: 53
End: 2023-04-10
Payer: COMMERCIAL

## 2023-04-10 VITALS
TEMPERATURE: 97.9 F | BODY MASS INDEX: 36.14 KG/M2 | WEIGHT: 204 LBS | DIASTOLIC BLOOD PRESSURE: 75 MMHG | SYSTOLIC BLOOD PRESSURE: 112 MMHG | HEART RATE: 76 BPM | OXYGEN SATURATION: 99 %

## 2023-04-10 DIAGNOSIS — A59.01 TRICHOMONAL VAGINITIS: ICD-10-CM

## 2023-04-10 DIAGNOSIS — J44.1 COPD EXACERBATION (H): Primary | ICD-10-CM

## 2023-04-10 DIAGNOSIS — N89.8 VAGINAL DISCHARGE: ICD-10-CM

## 2023-04-10 LAB
C TRACH DNA SPEC QL NAA+PROBE: NEGATIVE
CLUE CELLS: ABNORMAL
N GONORRHOEA DNA SPEC QL NAA+PROBE: NEGATIVE
TRICHOMONAS, WET PREP: PRESENT
WBC'S/HIGH POWER FIELD, WET PREP: ABNORMAL
YEAST, WET PREP: ABNORMAL

## 2023-04-10 PROCEDURE — 87591 N.GONORRHOEAE DNA AMP PROB: CPT | Performed by: FAMILY MEDICINE

## 2023-04-10 PROCEDURE — 87491 CHLMYD TRACH DNA AMP PROBE: CPT | Performed by: FAMILY MEDICINE

## 2023-04-10 PROCEDURE — 99214 OFFICE O/P EST MOD 30 MIN: CPT | Performed by: FAMILY MEDICINE

## 2023-04-10 PROCEDURE — 87210 SMEAR WET MOUNT SALINE/INK: CPT | Performed by: FAMILY MEDICINE

## 2023-04-10 RX ORDER — METRONIDAZOLE 500 MG/1
500 TABLET ORAL 2 TIMES DAILY
Qty: 14 TABLET | Refills: 0 | Status: SHIPPED | OUTPATIENT
Start: 2023-04-10 | End: 2023-04-17

## 2023-04-10 RX ORDER — DOXYCYCLINE 100 MG/1
100 TABLET ORAL 2 TIMES DAILY
Qty: 14 TABLET | Refills: 0 | Status: SHIPPED | OUTPATIENT
Start: 2023-04-10 | End: 2023-04-17

## 2023-04-10 RX ORDER — PREDNISONE 20 MG/1
20 TABLET ORAL 2 TIMES DAILY
Qty: 10 TABLET | Refills: 0 | Status: SHIPPED | OUTPATIENT
Start: 2023-04-10 | End: 2023-04-15

## 2023-04-10 NOTE — TELEPHONE ENCOUNTER
Nurse Triage SBAR    Is this a 2nd Level Triage? NO    Situation: call transferred to triage due to patient stating she had a stroke over the weekend and is drooling.     Background: Patient states she thinks she had a mini stroke or something happen over the weekend and she is drooling today.     Assessment: Yesterday was walking to the store with a friend, store located about 2 blocks from her home when double vision suddenly developed and she felt weak. She was with a friend who had to help support her to walk home. She states her legs were hurting during the walk and kept wanting to stop, but she kept going since she was close to home.     Then after she got to her apartment, she was walking down the hallway to her bedroom, lost her balance and fell, hitting her head on a door. She then went to bed and stayed in bed the rest of yesterday. Her balance has not been good for the past few weeks so this is not unusual for her.     Patient states her vision is back to normal today, but both her legs still feel equally weak. Patient denies any weakness in her arms. She is getting tired very easily with walking around today. She was drooling all night and this continues this morning, she is having to wipe her mouth frequently. She was able to take a drink of fluid and swallowed the fluid completely without drooling. Asked her to look at her face in a mirror and patient states the left side is drooping. No headache today and did not have a headache yesterday, but she did have a headache about 3 days prior to this and doesn't normally have headaches. She states when the headache occurred, she felt pain behind her eyes and around the side of her head. She took Ibuprofen and this provided some relief. She also states her memory has not been very good recently.     Protocol Recommended Disposition:   Call  Now, See More Appropriate Protocol    Recommendation: Advised patient that due to one-sided facial weakness, leg  weakness and fatigue, recommended she be evaluated in ER ASAP. Patient hesitant to go to ER, states she did leave a message for her  this morning and they could arrange cab for her to get to and from hospital. Advised patient it is important to be evaluated this morning and to call ambulance for transport if she cannot get to the ER. Patient wanting to try again to reach her  before going to ER. Advised patient that these symptoms are serious and important to be evaluated right away. She did not commit to going to ER and asked this writer to resend her message from last week regarding COPD flare-up to Dr. Low (see 4/5/23 telephone encounter).     Does the patient meet one of the following criteria for ADS visit consideration? 16+ years old, with an [facility] PCP     TIP  Providers, please consider if this condition is appropriate for management at one of our Acute and Diagnostic Services sites.     If patient is a good candidate, please use dotphrase <dot>triageresponse and select Refer to ADS to document.    Brenda Franks RN  [facility]     Reason for Disposition    Weakness of the face, arm or leg on one side of the body    New neurologic deficit that is present NOW, sudden onset of ANY of the following: * Weakness of the face, arm, or leg on one side of the body* Numbness of the face, arm, or leg on one side of the body* Loss of speech or garbled speech    Additional Information    Negative: Difficult to awaken or acting confused (e.g., disoriented, slurred speech)    Protocols used: VISION LOSS OR CHANGE-A-OH, NEUROLOGIC DEFICIT-A-OH

## 2023-04-10 NOTE — TELEPHONE ENCOUNTER
Patient asks that message be resent to Dr. Low regarding COPD.     *This writer also spoke to patient regarding facial drooping, bilateral leg weakness, fatigue and vision changes occurring yesterday and today. See 4/10/23 Nurse Triage encounter. Patient was directed to ER for evaluation of these symptoms.    Brenda Franks RN  [facility]

## 2023-04-10 NOTE — PROGRESS NOTES
"SUBJECTIVE: Swetha Patterson is a 52 year old female presenting with a chief complaint of cough  and vaginal dc.  Onset of symptoms was day(s) ago.  Predisposing factors include HX of COPD.    Past Medical History:   Diagnosis Date     Anxiety state, unspecified      Arthritis      Asthma      Chronic pain     back pain from cyst     Contact dermatitis and other eczema, due to unspecified cause      COPD (chronic obstructive pulmonary disease) (H)      Depressive disorder      Depressive disorder, not elsewhere classified      Diabetes (H)      Emphysema with chronic bronchitis (H)      Esophageal reflux      Family history of ischemic heart disease      Fibromyalgia      Gastro-oesophageal reflux disease      Heart disease     has chest pain sometimes     History of emphysema      Hoarseness      HTN, goal below 140/90      Hyperlipidemia LDL goal <130      Liver disease     \"fatty liver\"     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)      PONV (postoperative nausea and vomiting)      Rectal bleeding      Thyroid disease      Allergies   Allergen Reactions     Codeine Nausea and Vomiting     vomiting     Cyclobenzaprine Nausea     Gabapentin Rash     Hydrocodone Nausea and Vomiting     Sulfa Drugs Nausea and Vomiting     Nausea     Social History     Tobacco Use     Smoking status: Former     Types: Other     Smokeless tobacco: Former     Quit date: 2/13/2022     Tobacco comments:     Quit smoking prior to bariatric surgery and because of my COPD   Vaping Use     Vaping status: Never Used     Passive vaping exposure: Yes   Substance Use Topics     Alcohol use: Not Currently       ROS:  SKIN: no rash  GI: no vomiting    OBJECTIVE:  /75   Pulse 76   Temp 97.9  F (36.6  C) (Oral)   Wt 92.5 kg (204 lb)   LMP 08/17/2013   SpO2 99%   BMI 36.14 kg/m  GENERAL APPEARANCE: healthy, alert and no distress  EYES: EOMI,  PERRL, conjunctiva clear  HENT: ear canals and TM's normal.  Nose and mouth without ulcers, " erythema or lesions  RESP: lungs clear to auscultation - no rales, rhonchi or wheezes  SKIN: no suspicious lesions or rashes      ICD-10-CM    1. COPD exacerbation (H)  J44.1 predniSONE (DELTASONE) 20 MG tablet     doxycycline monohydrate (ADOXA) 100 MG tablet      2. Vaginal discharge  N89.8 NEISSERIA GONORRHOEA PCR     CHLAMYDIA TRACHOMATIS PCR     Wet prep - Clinic Collect      3. Trichomonal vaginitis  A59.01 metroNIDAZOLE (FLAGYL) 500 MG tablet        cont inhalers  Fluids/Rest, f/u if worse/not any better

## 2023-04-12 ENCOUNTER — NURSE TRIAGE (OUTPATIENT)
Dept: INTERNAL MEDICINE | Facility: CLINIC | Age: 53
End: 2023-04-12

## 2023-04-12 ENCOUNTER — HOSPITAL ENCOUNTER (EMERGENCY)
Facility: CLINIC | Age: 53
Discharge: HOME | End: 2023-04-12
Attending: EMERGENCY MEDICINE | Admitting: EMERGENCY MEDICINE
Payer: COMMERCIAL

## 2023-04-12 ENCOUNTER — APPOINTMENT (OUTPATIENT)
Dept: GENERAL RADIOLOGY | Facility: CLINIC | Age: 53
End: 2023-04-12
Attending: EMERGENCY MEDICINE
Payer: COMMERCIAL

## 2023-04-12 VITALS
HEART RATE: 88 BPM | OXYGEN SATURATION: 91 % | DIASTOLIC BLOOD PRESSURE: 67 MMHG | TEMPERATURE: 98.2 F | SYSTOLIC BLOOD PRESSURE: 118 MMHG | RESPIRATION RATE: 22 BRPM

## 2023-04-12 DIAGNOSIS — J44.1 COPD EXACERBATION (H): ICD-10-CM

## 2023-04-12 DIAGNOSIS — B34.9 ACUTE VIRAL SYNDROME: ICD-10-CM

## 2023-04-12 DIAGNOSIS — J44.1 COPD EXACERBATION (H): Primary | ICD-10-CM

## 2023-04-12 LAB
ALBUMIN SERPL BCG-MCNC: 4.3 G/DL (ref 3.5–5.2)
ALP SERPL-CCNC: 169 U/L (ref 35–104)
ALT SERPL W P-5'-P-CCNC: 31 U/L (ref 10–35)
ANION GAP SERPL CALCULATED.3IONS-SCNC: 13 MMOL/L (ref 7–15)
AST SERPL W P-5'-P-CCNC: 34 U/L (ref 10–35)
ATRIAL RATE - MUSE: 92 BPM
BASOPHILS # BLD AUTO: 0 10E3/UL (ref 0–0.2)
BASOPHILS NFR BLD AUTO: 0 %
BILIRUB SERPL-MCNC: <0.2 MG/DL
BUN SERPL-MCNC: 15.3 MG/DL (ref 6–20)
CALCIUM SERPL-MCNC: 9 MG/DL (ref 8.6–10)
CHLORIDE SERPL-SCNC: 103 MMOL/L (ref 98–107)
CREAT SERPL-MCNC: 0.81 MG/DL (ref 0.51–0.95)
D DIMER PPP FEU-MCNC: 0.31 UG/ML FEU (ref 0–0.5)
DEPRECATED HCO3 PLAS-SCNC: 24 MMOL/L (ref 22–29)
DIASTOLIC BLOOD PRESSURE - MUSE: NORMAL MMHG
EOSINOPHIL # BLD AUTO: 0 10E3/UL (ref 0–0.7)
EOSINOPHIL NFR BLD AUTO: 0 %
ERYTHROCYTE [DISTWIDTH] IN BLOOD BY AUTOMATED COUNT: 17 % (ref 10–15)
FLUAV RNA SPEC QL NAA+PROBE: NEGATIVE
FLUBV RNA RESP QL NAA+PROBE: NEGATIVE
GFR SERPL CREATININE-BSD FRML MDRD: 86 ML/MIN/1.73M2
GLUCOSE SERPL-MCNC: 122 MG/DL (ref 70–99)
HCT VFR BLD AUTO: 36 % (ref 35–47)
HGB BLD-MCNC: 11.6 G/DL (ref 11.7–15.7)
IMM GRANULOCYTES # BLD: 0.1 10E3/UL
IMM GRANULOCYTES NFR BLD: 1 %
INTERPRETATION ECG - MUSE: NORMAL
LYMPHOCYTES # BLD AUTO: 2.1 10E3/UL (ref 0.8–5.3)
LYMPHOCYTES NFR BLD AUTO: 18 %
MCH RBC QN AUTO: 28.6 PG (ref 26.5–33)
MCHC RBC AUTO-ENTMCNC: 32.2 G/DL (ref 31.5–36.5)
MCV RBC AUTO: 89 FL (ref 78–100)
MONOCYTES # BLD AUTO: 0.8 10E3/UL (ref 0–1.3)
MONOCYTES NFR BLD AUTO: 7 %
NEUTROPHILS # BLD AUTO: 8.8 10E3/UL (ref 1.6–8.3)
NEUTROPHILS NFR BLD AUTO: 74 %
NRBC # BLD AUTO: 0 10E3/UL
NRBC BLD AUTO-RTO: 0 /100
NT-PROBNP SERPL-MCNC: 142 PG/ML (ref 0–900)
P AXIS - MUSE: 68 DEGREES
PLATELET # BLD AUTO: 311 10E3/UL (ref 150–450)
POTASSIUM SERPL-SCNC: 3.9 MMOL/L (ref 3.4–5.3)
PR INTERVAL - MUSE: 126 MS
PROT SERPL-MCNC: 7.3 G/DL (ref 6.4–8.3)
QRS DURATION - MUSE: 92 MS
QT - MUSE: 390 MS
QTC - MUSE: 482 MS
R AXIS - MUSE: 59 DEGREES
RBC # BLD AUTO: 4.05 10E6/UL (ref 3.8–5.2)
RSV RNA SPEC NAA+PROBE: NEGATIVE
SARS-COV-2 RNA RESP QL NAA+PROBE: NEGATIVE
SODIUM SERPL-SCNC: 140 MMOL/L (ref 136–145)
SYSTOLIC BLOOD PRESSURE - MUSE: NORMAL MMHG
T AXIS - MUSE: 57 DEGREES
TROPONIN T SERPL HS-MCNC: <6 NG/L
VENTRICULAR RATE- MUSE: 92 BPM
WBC # BLD AUTO: 11.8 10E3/UL (ref 4–11)

## 2023-04-12 PROCEDURE — 80053 COMPREHEN METABOLIC PANEL: CPT | Performed by: EMERGENCY MEDICINE

## 2023-04-12 PROCEDURE — 71046 X-RAY EXAM CHEST 2 VIEWS: CPT

## 2023-04-12 PROCEDURE — 36415 COLL VENOUS BLD VENIPUNCTURE: CPT | Performed by: EMERGENCY MEDICINE

## 2023-04-12 PROCEDURE — 85379 FIBRIN DEGRADATION QUANT: CPT | Performed by: EMERGENCY MEDICINE

## 2023-04-12 PROCEDURE — 250N000011 HC RX IP 250 OP 636: Performed by: EMERGENCY MEDICINE

## 2023-04-12 PROCEDURE — 87637 SARSCOV2&INF A&B&RSV AMP PRB: CPT | Performed by: EMERGENCY MEDICINE

## 2023-04-12 PROCEDURE — 83880 ASSAY OF NATRIURETIC PEPTIDE: CPT | Performed by: EMERGENCY MEDICINE

## 2023-04-12 PROCEDURE — 93005 ELECTROCARDIOGRAM TRACING: CPT

## 2023-04-12 PROCEDURE — 84484 ASSAY OF TROPONIN QUANT: CPT | Performed by: EMERGENCY MEDICINE

## 2023-04-12 PROCEDURE — 250N000013 HC RX MED GY IP 250 OP 250 PS 637: Performed by: EMERGENCY MEDICINE

## 2023-04-12 PROCEDURE — 99285 EMERGENCY DEPT VISIT HI MDM: CPT | Mod: CS,25

## 2023-04-12 PROCEDURE — 250N000009 HC RX 250: Performed by: EMERGENCY MEDICINE

## 2023-04-12 PROCEDURE — C9803 HOPD COVID-19 SPEC COLLECT: HCPCS

## 2023-04-12 PROCEDURE — 85004 AUTOMATED DIFF WBC COUNT: CPT | Performed by: EMERGENCY MEDICINE

## 2023-04-12 RX ORDER — HYDROXYZINE HYDROCHLORIDE 25 MG/1
25 TABLET, FILM COATED ORAL ONCE
Status: DISCONTINUED | OUTPATIENT
Start: 2023-04-12 | End: 2023-04-12 | Stop reason: HOSPADM

## 2023-04-12 RX ORDER — OXYCODONE HYDROCHLORIDE 5 MG/1
5 TABLET ORAL ONCE
Status: COMPLETED | OUTPATIENT
Start: 2023-04-12 | End: 2023-04-12

## 2023-04-12 RX ORDER — IPRATROPIUM BROMIDE AND ALBUTEROL SULFATE 2.5; .5 MG/3ML; MG/3ML
3 SOLUTION RESPIRATORY (INHALATION) ONCE
Status: COMPLETED | OUTPATIENT
Start: 2023-04-12 | End: 2023-04-12

## 2023-04-12 RX ORDER — HYDROXYZINE PAMOATE 25 MG/1
25 CAPSULE ORAL EVERY 6 HOURS PRN
Qty: 30 CAPSULE | Refills: 0 | Status: SHIPPED | OUTPATIENT
Start: 2023-04-12 | End: 2023-04-12

## 2023-04-12 RX ORDER — KETOROLAC TROMETHAMINE 15 MG/ML
15 INJECTION, SOLUTION INTRAMUSCULAR; INTRAVENOUS ONCE
Status: COMPLETED | OUTPATIENT
Start: 2023-04-12 | End: 2023-04-12

## 2023-04-12 RX ORDER — HYDROXYZINE PAMOATE 25 MG/1
25 CAPSULE ORAL EVERY 6 HOURS PRN
Qty: 30 CAPSULE | Refills: 0 | Status: SHIPPED | OUTPATIENT
Start: 2023-04-12 | End: 2023-07-11 | Stop reason: ALTCHOICE

## 2023-04-12 RX ORDER — DEXAMETHASONE SODIUM PHOSPHATE 10 MG/ML
6 INJECTION, SOLUTION INTRAMUSCULAR; INTRAVENOUS ONCE
Status: COMPLETED | OUTPATIENT
Start: 2023-04-12 | End: 2023-04-12

## 2023-04-12 RX ADMIN — OXYCODONE HYDROCHLORIDE 5 MG: 5 TABLET ORAL at 04:23

## 2023-04-12 RX ADMIN — IPRATROPIUM BROMIDE AND ALBUTEROL SULFATE 3 ML: .5; 3 SOLUTION RESPIRATORY (INHALATION) at 04:23

## 2023-04-12 RX ADMIN — IPRATROPIUM BROMIDE AND ALBUTEROL SULFATE 3 ML: .5; 3 SOLUTION RESPIRATORY (INHALATION) at 02:55

## 2023-04-12 RX ADMIN — KETOROLAC TROMETHAMINE 15 MG: 15 INJECTION, SOLUTION INTRAMUSCULAR; INTRAVENOUS at 04:23

## 2023-04-12 RX ADMIN — OXYCODONE HYDROCHLORIDE 5 MG: 5 TABLET ORAL at 02:55

## 2023-04-12 RX ADMIN — DEXAMETHASONE SODIUM PHOSPHATE 6 MG: 10 INJECTION, SOLUTION INTRAMUSCULAR; INTRAVENOUS at 02:55

## 2023-04-12 ASSESSMENT — ENCOUNTER SYMPTOMS
COUGH: 1
NAUSEA: 0
CHILLS: 1
SHORTNESS OF BREATH: 1
WHEEZING: 1
DIARRHEA: 1
DIAPHORESIS: 1
ABDOMINAL PAIN: 0
VOMITING: 0

## 2023-04-12 ASSESSMENT — ACTIVITIES OF DAILY LIVING (ADL)
ADLS_ACUITY_SCORE: 35
ADLS_ACUITY_SCORE: 35

## 2023-04-12 NOTE — TELEPHONE ENCOUNTER
"  Reason for Disposition    MODERATE difficulty breathing (e.g., speaks in phrases, SOB even at rest, pulse 100-120) and still present when not coughing    Additional Information    Negative: Bluish (or gray) lips or face    Negative: SEVERE difficulty breathing (e.g., struggling for each breath, speaks in single words)    Negative: Rapid onset of cough and has hives    Negative: Coughing started suddenly after medicine, an allergic food or bee sting    Negative: Difficulty breathing after exposure to flames, smoke, or fumes    Negative: Sounds like a life-threatening emergency to the triager    Negative: Previous asthma attacks and this feels like asthma attack    Negative: Dry cough (non-productive; no sputum or minimal clear sputum) and within 14 days of COVID-19 Exposure    Answer Assessment - Initial Assessment Questions  1. ONSET: \"When did the cough begin?\"       Ongoing cough  2. SEVERITY: \"How bad is the cough today?\"       Really bad today  3. SPUTUM: \"Describe the color of your sputum\" (none, dry cough; clear, white, yellow, green)      Dry cough  4. HEMOPTYSIS: \"Are you coughing up any blood?\" If so ask: \"How much?\" (flecks, streaks, tablespoons, etc.)      no  5. DIFFICULTY BREATHING: \"Are you having difficulty breathing?\" If Yes, ask: \"How bad is it?\" (e.g., mild, moderate, severe)     - MILD: No SOB at rest, mild SOB with walking, speaks normally in sentences, can lie down, no retractions, pulse < 100.     - MODERATE: SOB at rest, SOB with minimal exertion and prefers to sit, cannot lie down flat, speaks in phrases, mild retractions, audible wheezing, pulse 100-120.     - SEVERE: Very SOB at rest, speaks in single words, struggling to breathe, sitting hunched forward, retractions, pulse > 120       SOB, all the time. History of COPD, was seen in ED today  6. FEVER: \"Do you have a fever?\" If Yes, ask: \"What is your temperature, how was it measured, and when did it start?\"      no  7. CARDIAC HISTORY: " "\"Do you have any history of heart disease?\" (e.g., heart attack, congestive heart failure)       HTN  8. LUNG HISTORY: \"Do you have any history of lung disease?\"  (e.g., pulmonary embolus, asthma, emphysema)      COPD  9. PE RISK FACTORS: \"Do you have a history of blood clots?\" (or: recent major surgery, recent prolonged travel, bedridden)      COPD  10. OTHER SYMPTOMS: \"Do you have any other symptoms?\" (e.g., runny nose, wheezing, chest pain)        Chest tightness  11. PREGNANCY: \"Is there any chance you are pregnant?\" \"When was your last menstrual period?\"        no  12. TRAVEL: \"Have you traveled out of the country in the last month?\" (e.g., travel history, exposures)        no    Protocols used: COUGH-A-OH    "

## 2023-04-12 NOTE — TELEPHONE ENCOUNTER
If symptoms are worsening, would recommend that patient be reevaluated in the emergency department.

## 2023-04-12 NOTE — TELEPHONE ENCOUNTER
"Nurse Triage SBAR    Is this a 2nd Level Triage? YES, LICENSED PRACTITIONER REVIEW IS REQUIRED    Situation: Patient calls back, has had numerous messages, visits regarding ongoing chest tightness, SOB    Background: Patient wants to be prescribed a different antibiotic. Was seen in ED today 4/12/23. Please see their notes.  Pt self doubled her Prednisone and did 2 nebs at home.  Doesn't think her current Rx of doxycycline monohydrate, is working.  Wants to have Dr. Low give her a Rx for a different antibiotic  Assessment: COPD exacerbation along with possible URI.    Protocol Recommended Disposition:   Go to ED Now    Recommendation: Pt refuses to go to ED. Will send to covering provider as Dr. Low is out for the day.  Pt refuses to go to ED \" no way I'm not going.\" Writer strongly encourages patient. Patient hung up on writer.  Routed to provider    Does the patient meet one of the following criteria for ADS visit consideration? 16+ years old, with an [facility] PCP     TIP  Providers, please consider if this condition is appropriate for management at one of our Acute and Diagnostic Services sites.     If patient is a good candidate, please use dotphrase <dot>triageresponse and select Refer to ADS to document.Called patient, left VM to call back. Notes indicate she was seen today in the ED. She was prescribed Prednisone and Doxycyline on 4/10/23. There would not be a need as of yet for Levofloxacin.  "

## 2023-04-12 NOTE — TELEPHONE ENCOUNTER
RX levofloxacin . Patient asking for this medication as soon as possible. She was told by the ER she was having a severe break out COPD .    Patient knows she is going downhill and this medication  is what will help her beat this      Patient number is [phone]

## 2023-04-12 NOTE — ED PROVIDER NOTES
History     Chief Complaint:  Flu Symptoms, Shortness of Breath, and Cough       HPI   Swetha Patterson is a 52 year old female with a history of COPD who presents with cough, body aches, shortness of breath, chills/cold sweats, and severe chest pain that she reports is constant but worsens with coughing. She notes a productive cough of yellowish sputum. She denies leg swelling. She denies hemoptysis. She denies vomiting but had one episode of diarrhea earlier in the evening. She denies known sick contacts but has had a recent trip in a [employer]. She took two albuterol nebs at home prior to calling the ambulance but they did not seem to help. She reports that she has not smoked in one year but lives with her father who smokes.     Independent Historian:   None - Patient Only    Review of External Notes: Reviewed external notes. Diagnosed with COPD exacerbation 4/10/23 and placed on prednisone and doxycyline.     ROS:  Review of Systems   Constitutional: Positive for chills and diaphoresis.   Respiratory: Positive for cough, shortness of breath and wheezing.    Cardiovascular: Positive for chest pain. Negative for leg swelling.   Gastrointestinal: Positive for diarrhea. Negative for abdominal pain, nausea and vomiting.   Genitourinary: Negative.    All other systems reviewed and are negative.      Allergies:  Codeine  Cyclobenzaprine  Gabapentin  Hydrocodone  Sulfa Drugs     Medications:    acetaminophen (TYLENOL) 500 MG tablet  albuterol (PROVENTIL) (2.5 MG/3ML) 0.083% neb solution  albuterol (VENTOLIN HFA) 108 (90 Base) MCG/ACT inhaler  amLODIPine (NORVASC) 2.5 MG tablet  atorvastatin (LIPITOR) 80 MG tablet  benzoyl peroxide (ACNE-CLEAR) 10 % external gel  blood glucose (ACCU-CHEK ALLYSON PLUS) test strip  blood glucose (NO BRAND SPECIFIED) lancets standard  blood glucose (NO BRAND SPECIFIED) test strip  blood glucose monitoring (NO BRAND SPECIFIED) meter device kit  blood glucose monitoring (NO BRAND SPECIFIED)  meter device kit  budesonide-formoterol (SYMBICORT) 160-4.5 MCG/ACT Inhaler  buPROPion (WELLBUTRIN XL) 150 MG 24 hr tablet  cetirizine (ZYRTEC) 10 MG tablet  clindamycin (CLINDAMAX) 1 % external gel  clonazePAM (KLONOPIN) 0.5 MG tablet  clonazePAM (KLONOPIN) 1 MG tablet  doxycycline hyclate (VIBRAMYCIN) 100 MG capsule  doxycycline monohydrate (ADOXA) 100 MG tablet  Emollient (CERAVE MOISTURIZING) CREA  fluticasone (FLONASE) 50 MCG/ACT nasal spray  furosemide (LASIX) 20 MG tablet  hydrocortisone 2.5 % cream  hydrOXYzine (ATARAX) 50 MG tablet  ibuprofen (ADVIL/MOTRIN) 200 MG tablet  lamoTRIgine (LAMICTAL) 150 MG tablet  levothyroxine (SYNTHROID/LEVOTHROID) 50 MCG tablet  lisinopril (ZESTRIL) 20 MG tablet  metFORMIN (GLUCOPHAGE) 500 MG tablet  methocarbamol (ROBAXIN) 500 MG tablet  metroNIDAZOLE (FLAGYL) 500 MG tablet  minoxidil (ROGAINE) 5 % external solution  montelukast (SINGULAIR) 10 MG tablet  multivitamin, therapeutic (THERA-VIT) TABS tablet  naltrexone (DEPADE/REVIA) 50 MG tablet  nitroGLYcerin (NITROSTAT) 0.4 MG sublingual tablet  nystatin (MYCOSTATIN) 610659 UNIT/GM external powder  omeprazole (PRILOSEC) 20 MG DR capsule  ondansetron (ZOFRAN-ODT) 4 MG ODT tab  permethrin (LICE TREATMENT CREME RINSE) 1 % external liquid  polyethylene glycol (MIRALAX) 17 GM/Dose powder  predniSONE (DELTASONE) 20 MG tablet  RYBELSUS 14 MG TABS  sucralfate (CARAFATE) 1 GM tablet  sulfamethoxazole-trimethoprim (BACTRIM DS) 800-160 MG tablet  terbinafine (LAMISIL) 1 % external cream  triamcinolone (KENALOG) 0.025 % external ointment  Vitamin D3 (CHOLECALCIFEROL) 125 MCG (5000 UT) tablet        Past Medical History:    Past Medical History:   Diagnosis Date     Anxiety state, unspecified      Arthritis      Asthma      Chronic pain      Contact dermatitis and other eczema, due to unspecified cause      COPD (chronic obstructive pulmonary disease) (H)      Depressive disorder      Depressive disorder, not elsewhere classified       Diabetes (H)      Emphysema with chronic bronchitis (H)      Esophageal reflux      Family history of ischemic heart disease      Fibromyalgia      Gastro-oesophageal reflux disease      Heart disease      History of emphysema      Hoarseness      HTN, goal below 140/90      Hyperlipidemia LDL goal <130      Liver disease      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)      PONV (postoperative nausea and vomiting)      Rectal bleeding      Thyroid disease        Past Surgical History:    Past Surgical History:   Procedure Laterality Date     ABDOMEN SURGERY       ANESTHESIA OUT OF OR MRI N/A 10/07/2021    Procedure: ANESTHESIA OUT OF OR MRI;  Surgeon: GENERIC ANESTHESIA PROVIDER;  Location: [facility]     ARTHROSCOPY SHOULDER DECOMPRESSION Left 10/21/2015    Procedure: ARTHROSCOPY SHOULDER DECOMPRESSION;  Surgeon: Julien Milian MD;  Location: [facility] OR     BIOPSY ARTERY TEMPORAL Left 03/11/2020    Procedure: LEFT TEMPORAL ARTERY BIOPSY;  Surgeon: Ibeth Conner MD;  Location: [facility]     BRONCHIAL THERMOPLASTY N/A 11/14/2014    Procedure: BRONCHIAL THERMOPLASTY;  Surgeon: Ward Whitaker MD;  [facility]     BRONCHIAL THERMOPLASTY N/A 12/19/2014    Procedure: BRONCHIAL THERMOPLASTY;  Surgeon: Ward Whitaker MD;  [facility]     BRONCHIAL THERMOPLASTY N/A 02/06/2015    Procedure: BRONCHIAL THERMOPLASTY;  Surgeon: Ward Whitaker MD;  [facility]     COLONOSCOPY N/A 11/26/2018    Procedure: COLONOSCOPY ([facility]);  Surgeon: Marshall Oakes MD;  Location: [facility] OR     COLONOSCOPY N/A 10/22/2020    Procedure: Colonoscopy;  Surgeon: Marshall Mccormick MD;  Location: [facility] OR     COLONOSCOPY N/A 1/20/2023    Procedure: COLONOSCOPY, FLEXIBLE, WITH LESION REMOVAL USING SNARE;  Surgeon: Carson Domínguez MD;  Location:  GI     DISCECTOMY, FUSION CERVICAL ANTERIOR ONE LEVEL, COMBINED N/A 05/01/2018    Procedure: COMBINED DISCECTOMY, FUSION CERVICAL ANTERIOR ONE LEVEL;  1.  C5-C6 anterior  cervical diskectomy and fusion.    2.  C5-C6 application of intervertebral biomechanical device for interbody fusion purposes.    3.  C5-C6 anterior instrumentation using the standard Kristin InViZia 24 mm plate with four associated bone screws, 12 mm in length.  Inferior screws are fixed.  Superior screws are va     ENT SURGERY  2015    polyps removed from vocal cords      ESOPHAGOSCOPY, GASTROSCOPY, DUODENOSCOPY (EGD), COMBINED N/A 10/22/2020    Procedure: Esophagoscopy, gastroscopy, duodenoscopy with biopsies;  Surgeon: Marshall Mccormick MD;  Location:  OR     ESOPHAGOSCOPY, GASTROSCOPY, DUODENOSCOPY (EGD), COMBINED N/A 06/17/2021    Procedure: ESOPHAGOGASTRODUODENOSCOPY, WITH BIOPSY;  Surgeon: Darrel Damon MD;  Location:  GI     EXCISE MASS NECK Left 2/1/2023    Procedure: exploration of  NECK left;  Surgeon: Ibeth Conner MD;  Location:  OR     EXCISE MASS TRUNK Left 11/03/2021    Procedure: EXCISION LEFT SHOULDER LIPOMA;  Surgeon: Ibeth Conner MD;  Location:  OR     EXCISE NODE MEDIASTINAL  04/26/2013    Procedure: EXCISE NODE MEDIASTINAL;;  Surgeon: Av Peña MD;  Location:  OR     HEAD & NECK SURGERY  2018    Had my five and six fused in my neck     LAPAROSCOPIC CHOLECYSTECTOMY N/A 10/19/2017    Procedure: LAPAROSCOPIC CHOLECYSTECTOMY;  LAPAROSCOPIC CHOLECYSTECTOMY;  Surgeon: Ney Jerry MD;  Location:  OR     THORACOSCOPY  04/26/2013    Procedure: THORACOSCOPY;  LEFT VIDEO ASSISTED THORACOSCOPY, RESECTION OF POSTERIOR MEDIASTINAL MASS;  Surgeon: Av Peña MD;  Location:  OR     TONSILLECTOMY  as a kid     TONSILLECTOMY       ZZC APPENDECTOMY  at age 17        Family History:    family history includes Anxiety Disorder in her sister; Asthma in her maternal grandfather, mother, and sister; Cerebrovascular Disease in her maternal grandfather, maternal grandmother, mother, and sister; Depression in her mother; Diabetes in her maternal  grandfather; Family History Negative in her brother and father; Gallbladder Disease in her mother; Heart Disease in her maternal grandfather and mother; Hyperlipidemia in her sister; Hypertension in her maternal grandfather, maternal grandmother, mother, sister, and another family member; Obesity in her mother; Thyroid Disease in her mother.    Social History:   reports that she has quit smoking. Her smoking use included other. She quit smokeless tobacco use about 13 months ago. She reports that she does not currently use alcohol. She reports that she does not use drugs.  PCP: Roro Chawla     Physical Exam     Patient Vitals for the past 24 hrs:   BP Temp Temp src Pulse Resp SpO2   04/12/23 0244 (!) 149/90 98.2  F (36.8  C) Oral 92 22 97 %        Physical Exam  General: Adult female sitting upright, coughing frequently  Eyes: PERRL, Conjunctive within normal limits  ENT: Moist mucous membranes, oropharynx clear.   CV: Normal S1S2, no murmur, rub or gallop. Regular rate and rhythm  Resp: Coarse wheezing bilaterally with prolonged expiratory phase. Sentences limited by coughing.  Increased work of breathing. Tachypneic.   GI: Abdomen is soft, nontender and nondistended. No palpable masses. No rebound or guarding.  MSK: No edema. Nontender. Normal active range of motion.  Skin: Warm and dry. No rashes or lesions or ecchymoses on visible skin.  Neuro: Alert and oriented. Responds appropriately to all questions and commands. No focal findings appreciated. Normal muscle tone.  Psych: Appropriate    Emergency Department Course   ECG:  ECG taken 0250, ECG read at 0252  Sinus rhythm with occasional PVC  Prolonged QT  Abnormal ECG  Rate 92 bpm. IN interval 126 ms. QRS duration 92 ms. QT/QTc 390/482 ms. P-R-T axes 68 59 57.    Imaging:  Chest XR,  PA & LAT   Final Result   IMPRESSION: Normal heart size and pulmonary vascularity. No acute infiltrates or consolidation. Postoperative changes with fusion  hardware in the lower cervical spine. Chest is otherwise negative. No acute findings.         Report per radiology    Laboratory:  Labs Ordered and Resulted from Time of ED Arrival to Time of ED Departure   COMPREHENSIVE METABOLIC PANEL - Abnormal       Result Value    Sodium 140      Potassium 3.9      Chloride 103      Carbon Dioxide (CO2) 24      Anion Gap 13      Urea Nitrogen 15.3      Creatinine 0.81      Calcium 9.0      Glucose 122 (*)     Alkaline Phosphatase 169 (*)     AST 34      ALT 31      Protein Total 7.3      Albumin 4.3      Bilirubin Total <0.2      GFR Estimate 86     CBC WITH PLATELETS AND DIFFERENTIAL - Abnormal    WBC Count 11.8 (*)     RBC Count 4.05      Hemoglobin 11.6 (*)     Hematocrit 36.0      MCV 89      MCH 28.6      MCHC 32.2      RDW 17.0 (*)     Platelet Count 311      % Neutrophils 74      % Lymphocytes 18      % Monocytes 7      % Eosinophils 0      % Basophils 0      % Immature Granulocytes 1      NRBCs per 100 WBC 0      Absolute Neutrophils 8.8 (*)     Absolute Lymphocytes 2.1      Absolute Monocytes 0.8      Absolute Eosinophils 0.0      Absolute Basophils 0.0      Absolute Immature Granulocytes 0.1      Absolute NRBCs 0.0     TROPONIN T, HIGH SENSITIVITY - Normal    Troponin T, High Sensitivity <6     INFLUENZA A/B, RSV, & SARS-COV2 PCR - Normal    Influenza A PCR Negative      Influenza B PCR Negative      RSV PCR Negative      SARS CoV2 PCR Negative     NT PROBNP INPATIENT - Normal    N terminal Pro BNP Inpatient 142     D DIMER QUANTITATIVE - Normal    D-Dimer Quantitative 0.31        Emergency Department Course & Assessments:    Interventions:  Medications   ipratropium - albuterol 0.5 mg/2.5 mg/3 mL (DUONEB) neb solution 3 mL (3 mLs Nebulization $Given 4/12/23 0255)   dexamethasone PF (DECADRON) injection 6 mg (6 mg Intravenous $Given 4/12/23 0255)   oxyCODONE (ROXICODONE) tablet 5 mg (5 mg Oral $Given 4/12/23 0255)   ketorolac (TORADOL) injection 15 mg (15 mg  Intravenous $Given 4/12/23 0423)   oxyCODONE (ROXICODONE) tablet 5 mg (5 mg Oral $Given 4/12/23 0423)   ipratropium - albuterol 0.5 mg/2.5 mg/3 mL (DUONEB) neb solution 3 mL (3 mLs Nebulization $Given 4/12/23 0423)        Assessments:    I examined the patient on arrival.  I reassessed the patient.  She notes mild improvement in symptoms.    I reassessed the patient after final DuoNeb.  She notes she is feeling much better.  She ambulates without desaturation.  She denies any new concerns.    Independent Interpretation (X-rays, CTs, rhythm strip):  I reviewed the patient's x-ray.  No infiltrate or effusion.  No pneumothorax.    Social Determinants of Health affecting care:   None    Disposition:  The patient was discharged to home.     Impression & Plan      Medical Decision Making:  Swetha Patterson is a 52 year old female with a history of COPD who presents for evaluation of shortness of breath, cough, body aches, and chills.   Signs and symptoms are consistent with COPD exacerbation, likely triggered by acute URI.  Patient had noted positive COVID test at home, however PCR testing here was negative.  No evidence of pneumonia.  A broad differential was considered including foreign body, COPD, viral induced reactive airway disease, pneumothorax, cardiac equivalent, allergic phenomena, pneumonia, bronchitis, etc.  Patient feels improved after interventions here in ED.   No indication for hospitalization at this time including no hypoxia, no marked increase in respiratory rate, minimal to no retractions.   Supportive outpatient management is indicated, medications for discharge noted above.  Close followup with primary care physician.  Return if increased wheezing, progressive shortness of breath, fever.    There are no signs at this point of any other serious etiologies including those mentioned above, especially acute coronary syndrome. I doubt this is ACS given the classic story of COPD exacerbation given by the  patient, the marked wheezing without rales and a nonspecific EKG does not demonstrate signs of ischemia or injury.  Troponin normal.  At this time safe for discharge home after passes ambulation trial.  She feels agreeable with the plan which includes continuing doxycycline and prednisone.  Ongoing breathing treatments at home.      Diagnosis:    ICD-10-CM    1. COPD exacerbation (H)  J44.1       2. Acute viral syndrome  B34.9            4/12/2023   Sally Herrera MD Jonkman, Tracy Dianne, MD  04/13/23 0230

## 2023-04-12 NOTE — ED TRIAGE NOTES
Pt BIBA from home d/t cough that is constant, fever, chills, diarrhea, SOB. Pt has hx of COPD. At home covid test came back positive twice. VSS, 97% RA. Pt does not use supplemental oxygen at home. Denies smoking, has been smoke free for a year. 750 tylenol given en route via EMS. ABCs intact, a&ox4.      Triage Assessment     Row Name 04/12/23 0246       Triage Assessment (Adult)    Airway WDL WDL       Respiratory WDL    Respiratory WDL X;all;cough    Rhythm/Pattern, Respiratory shortness of breath    Cough Frequency frequent       Skin Circulation/Temperature WDL    Skin Circulation/Temperature WDL WDL       Cardiac WDL    Cardiac WDL X;chest pain  cp d/t constant coughing       Peripheral/Neurovascular WDL    Peripheral Neurovascular WDL WDL       Cognitive/Neuro/Behavioral WDL    Cognitive/Neuro/Behavioral WDL WDL

## 2023-04-14 RX ORDER — CEFDINIR 300 MG/1
300 CAPSULE ORAL 2 TIMES DAILY
Qty: 10 CAPSULE | Refills: 0 | Status: SHIPPED | OUTPATIENT
Start: 2023-04-14 | End: 2023-08-29

## 2023-04-18 DIAGNOSIS — E03.9 HYPOTHYROIDISM, UNSPECIFIED TYPE: ICD-10-CM

## 2023-04-18 DIAGNOSIS — L30.9 ECZEMA, UNSPECIFIED TYPE: ICD-10-CM

## 2023-04-18 DIAGNOSIS — I10 HTN, GOAL BELOW 140/90: ICD-10-CM

## 2023-04-18 DIAGNOSIS — J30.89 DUST ALLERGY: ICD-10-CM

## 2023-04-18 DIAGNOSIS — J44.9 COPD, MODERATE (H): ICD-10-CM

## 2023-04-21 NOTE — TELEPHONE ENCOUNTER
The patient will need to be a reevaluated in urgent care    Patient has an appointment for April 19, but I believe she canceled it

## 2023-04-21 NOTE — TELEPHONE ENCOUNTER
Patient is calling and said she does not feel any better even taking the omnicef. She says there has been a medication that works better for her but she cant remember the name of it.

## 2023-04-23 RX ORDER — LEVOTHYROXINE SODIUM 50 UG/1
TABLET ORAL
Qty: 90 TABLET | Refills: 0 | Status: SHIPPED | OUTPATIENT
Start: 2023-04-23 | End: 2023-08-29

## 2023-04-23 RX ORDER — ALBUTEROL SULFATE 90 UG/1
AEROSOL, METERED RESPIRATORY (INHALATION)
Qty: 18 G | Refills: 0 | Status: SHIPPED | OUTPATIENT
Start: 2023-04-23 | End: 2023-10-18

## 2023-04-23 RX ORDER — MONTELUKAST SODIUM 10 MG/1
TABLET ORAL
Qty: 90 TABLET | Refills: 1 | Status: SHIPPED | OUTPATIENT
Start: 2023-04-23 | End: 2023-06-22

## 2023-04-23 RX ORDER — LISINOPRIL 20 MG/1
TABLET ORAL
Qty: 90 TABLET | Refills: 0 | Status: SHIPPED | OUTPATIENT
Start: 2023-04-23 | End: 2023-07-20

## 2023-04-23 RX ORDER — HYDROCORTISONE 2.5 %
CREAM (GRAM) TOPICAL
Qty: 30 G | Refills: 1 | Status: SHIPPED | OUTPATIENT
Start: 2023-04-23 | End: 2023-08-29

## 2023-04-25 ENCOUNTER — TELEPHONE (OUTPATIENT)
Dept: INTERNAL MEDICINE | Facility: CLINIC | Age: 53
End: 2023-04-25
Payer: COMMERCIAL

## 2023-04-26 ENCOUNTER — TRANSFERRED RECORDS (OUTPATIENT)
Dept: HEALTH INFORMATION MANAGEMENT | Facility: CLINIC | Age: 53
End: 2023-04-26
Payer: COMMERCIAL

## 2023-04-26 DIAGNOSIS — Z71.6 ENCOUNTER FOR SMOKING CESSATION COUNSELING: ICD-10-CM

## 2023-04-26 LAB — RETINOPATHY: NEGATIVE

## 2023-04-27 ENCOUNTER — MEDICAL CORRESPONDENCE (OUTPATIENT)
Dept: HEALTH INFORMATION MANAGEMENT | Facility: CLINIC | Age: 53
End: 2023-04-27

## 2023-04-27 RX ORDER — BUPROPION HYDROCHLORIDE 150 MG/1
TABLET ORAL
Qty: 90 TABLET | Refills: 1 | Status: SHIPPED | OUTPATIENT
Start: 2023-04-27 | End: 2023-06-07

## 2023-04-27 NOTE — TELEPHONE ENCOUNTER
Prescription approved per [facility] Refill Protocol.  Solomon HALL RN, BSN

## 2023-04-28 DIAGNOSIS — R73.03 PREDIABETES: ICD-10-CM

## 2023-05-02 NOTE — TELEPHONE ENCOUNTER
Pending Prescriptions:                       Disp   Refills    metFORMIN (GLUCOPHAGE) 500 MG tablet [Phar*90 tab*0        Sig: TAKE ONE TABLET BY MOUTH EVERY DAY WITH BREAKFAST    Routing refill request to provider for review/approval because:  Hemoglobin A1C   Date Value Ref Range Status   08/17/2022 5.5 0.0 - 5.6 % Final     Comment:     Normal <5.7%   Prediabetes 5.7-6.4%    Diabetes 6.5% or higher     Note: Adopted from ADA consensus guidelines.   10/16/2020 5.6 0 - 5.6 % Final     Comment:     Normal <5.7% Prediabetes 5.7-6.4%  Diabetes 6.5% or higher - adopted from ADA   consensus guidelines.

## 2023-05-03 DIAGNOSIS — J44.9 COPD, MODERATE (H): ICD-10-CM

## 2023-05-04 ENCOUNTER — TELEPHONE (OUTPATIENT)
Dept: MEDSURG UNIT | Facility: CLINIC | Age: 53
End: 2023-05-04
Payer: COMMERCIAL

## 2023-05-04 RX ORDER — CETIRIZINE HYDROCHLORIDE 10 MG/1
TABLET ORAL
Qty: 90 TABLET | Refills: 2 | Status: SHIPPED | OUTPATIENT
Start: 2023-05-04 | End: 2024-01-29

## 2023-05-04 NOTE — TELEPHONE ENCOUNTER
Pending Prescriptions:                       Disp   Refills    ALLERGY RELIEF CETIRIZINE 10 MG tablet [P*90 tab*0            Sig: TAKE ONE TABLET BY MOUTH EVERY DAY AS NEEDED    Prescription approved per [facility] Refill Protocol.

## 2023-05-07 PROBLEM — K76.0 STEATOSIS OF LIVER: Status: RESOLVED | Noted: 2017-10-09 | Resolved: 2023-05-07

## 2023-05-07 PROBLEM — R10.13 EPIGASTRIC PAIN: Status: RESOLVED | Noted: 2017-09-26 | Resolved: 2023-05-07

## 2023-05-07 PROBLEM — F31.62 BIPOLAR AFFECTIVE DISORDER, MIXED, MODERATE (H): Status: RESOLVED | Noted: 2023-02-20 | Resolved: 2023-05-07

## 2023-05-07 PROBLEM — I73.9 CLAUDICATION OF BOTH LOWER EXTREMITIES (H): Status: RESOLVED | Noted: 2020-03-06 | Resolved: 2023-05-07

## 2023-05-07 PROBLEM — K44.9 DIAPHRAGMATIC HERNIA: Status: RESOLVED | Noted: 2017-09-22 | Resolved: 2023-05-07

## 2023-05-07 PROBLEM — E78.00 PURE HYPERCHOLESTEROLEMIA: Status: ACTIVE | Noted: 2023-05-07

## 2023-05-07 PROBLEM — E03.9 HYPOTHYROIDISM: Status: ACTIVE | Noted: 2023-05-07

## 2023-05-07 PROBLEM — R73.01 IMPAIRED FASTING GLUCOSE: Status: ACTIVE | Noted: 2023-05-07

## 2023-05-07 PROBLEM — R11.0 NAUSEA: Status: RESOLVED | Noted: 2021-11-22 | Resolved: 2023-05-07

## 2023-05-07 PROBLEM — R10.9 CHRONIC ABDOMINAL PAIN: Status: ACTIVE | Noted: 2023-05-07

## 2023-05-07 PROBLEM — F33.9 MAJOR DEPRESSION, RECURRENT (H): Status: RESOLVED | Noted: 2023-02-20 | Resolved: 2023-05-07

## 2023-05-07 PROBLEM — I10 HTN, GOAL BELOW 140/90: Status: RESOLVED | Noted: 2019-01-12 | Resolved: 2023-05-07

## 2023-05-07 PROBLEM — R11.2 PONV (POSTOPERATIVE NAUSEA AND VOMITING): Status: ACTIVE | Noted: 2023-05-07

## 2023-05-07 PROBLEM — K62.9 ANORECTAL DISORDER: Status: RESOLVED | Noted: 2019-08-23 | Resolved: 2023-05-07

## 2023-05-07 PROBLEM — K62.89 RECTAL PAIN: Status: RESOLVED | Noted: 2021-11-22 | Resolved: 2023-05-07

## 2023-05-07 PROBLEM — M26.69 JAW CLAUDICATION: Status: RESOLVED | Noted: 2020-03-06 | Resolved: 2023-05-07

## 2023-05-07 PROBLEM — K59.00 CONSTIPATION: Status: RESOLVED | Noted: 2023-02-20 | Resolved: 2023-05-07

## 2023-05-07 PROBLEM — Z98.890 PONV (POSTOPERATIVE NAUSEA AND VOMITING): Status: ACTIVE | Noted: 2023-05-07

## 2023-05-07 PROBLEM — J38.1 VOCAL CORD POLYP: Status: RESOLVED | Noted: 2018-06-28 | Resolved: 2023-05-07

## 2023-05-07 PROBLEM — I73.9 PAD (PERIPHERAL ARTERY DISEASE) (H): Status: RESOLVED | Noted: 2020-03-06 | Resolved: 2023-05-07

## 2023-05-07 PROBLEM — K59.09 CHRONIC CONSTIPATION: Status: ACTIVE | Noted: 2023-05-07

## 2023-05-07 PROBLEM — R73.03 PREDIABETES: Status: RESOLVED | Noted: 2023-02-20 | Resolved: 2023-05-07

## 2023-05-07 PROBLEM — Z98.1 STATUS POST CERVICAL SPINAL FUSION: Status: RESOLVED | Noted: 2018-05-01 | Resolved: 2023-05-07

## 2023-05-07 PROBLEM — E66.9 OBESITY (BMI 30-39.9): Status: ACTIVE | Noted: 2023-05-07

## 2023-05-07 PROBLEM — G89.29 CHRONIC ABDOMINAL PAIN: Status: ACTIVE | Noted: 2023-05-07

## 2023-05-07 PROBLEM — E66.01 SEVERE OBESITY (BMI 35.0-35.9 WITH COMORBIDITY) (H): Status: RESOLVED | Noted: 2020-08-31 | Resolved: 2023-05-07

## 2023-05-07 PROBLEM — R10.84 GENERALIZED ABDOMINAL PAIN: Status: RESOLVED | Noted: 2023-02-20 | Resolved: 2023-05-07

## 2023-05-07 PROBLEM — Z86.0100 HISTORY OF COLON POLYPS: Status: RESOLVED | Noted: 2023-02-20 | Resolved: 2023-05-07

## 2023-05-07 PROBLEM — J45.30 MILD PERSISTENT ASTHMA WITHOUT COMPLICATION: Status: RESOLVED | Noted: 2023-02-20 | Resolved: 2023-05-07

## 2023-05-07 PROBLEM — I10 BENIGN ESSENTIAL HYPERTENSION: Status: ACTIVE | Noted: 2023-05-07

## 2023-05-07 PROBLEM — R19.8 DIGESTIVE SYMPTOM: Status: RESOLVED | Noted: 2018-09-18 | Resolved: 2023-05-07

## 2023-05-07 PROBLEM — F31.9 BIPOLAR DISORDER (H): Status: ACTIVE | Noted: 2023-05-07

## 2023-05-07 PROBLEM — R19.8 CHANGE IN BOWEL MOVEMENT: Status: RESOLVED | Noted: 2021-11-22 | Resolved: 2023-05-07

## 2023-05-07 PROBLEM — J44.9 COPD (CHRONIC OBSTRUCTIVE PULMONARY DISEASE) (H): Status: ACTIVE | Noted: 2023-05-07

## 2023-05-09 ENCOUNTER — TELEPHONE (OUTPATIENT)
Dept: MEDSURG UNIT | Facility: CLINIC | Age: 53
End: 2023-05-09
Payer: COMMERCIAL

## 2023-05-09 ENCOUNTER — TELEPHONE (OUTPATIENT)
Dept: INTERNAL MEDICINE | Facility: CLINIC | Age: 53
End: 2023-05-09
Payer: COMMERCIAL

## 2023-05-09 NOTE — TELEPHONE ENCOUNTER
Pt called regarding H&P status.  Per notes/appts in epic there is a cancelled appt from yesterday 5/8/23.  LVM to return call.

## 2023-05-11 ENCOUNTER — TELEPHONE (OUTPATIENT)
Dept: INTERNAL MEDICINE | Facility: CLINIC | Age: 53
End: 2023-05-11
Payer: COMMERCIAL

## 2023-05-14 DIAGNOSIS — E11.9 TYPE 2 DIABETES MELLITUS WITHOUT COMPLICATION, WITHOUT LONG-TERM CURRENT USE OF INSULIN (H): ICD-10-CM

## 2023-05-16 NOTE — TELEPHONE ENCOUNTER
Routing refill request to provider for review/approval because:  Labs not current:  A1c     Brenda Franks RN  [facility]

## 2023-05-17 ENCOUNTER — TELEPHONE (OUTPATIENT)
Dept: DERMATOLOGY | Facility: CLINIC | Age: 53
End: 2023-05-17
Payer: COMMERCIAL

## 2023-05-17 DIAGNOSIS — R73.03 PREDIABETES: ICD-10-CM

## 2023-05-17 DIAGNOSIS — E78.5 HYPERLIPIDEMIA LDL GOAL <130: Chronic | ICD-10-CM

## 2023-05-17 RX ORDER — ORAL SEMAGLUTIDE 14 MG/1
TABLET ORAL
Qty: 30 TABLET | Refills: 3 | Status: SHIPPED | OUTPATIENT
Start: 2023-05-17 | End: 2023-08-26

## 2023-05-17 RX ORDER — ATORVASTATIN CALCIUM 80 MG/1
TABLET, FILM COATED ORAL
Qty: 90 TABLET | Refills: 0 | Status: SHIPPED | OUTPATIENT
Start: 2023-05-17 | End: 2023-08-14

## 2023-05-17 NOTE — TELEPHONE ENCOUNTER
Routing refill request to provider for review/approval because:  Labs not current:  A1C    Next 5 appointments (look out 90 days)    Aug 03, 2023 11:30 AM  (Arrive by 11:10 AM)  Adult Preventative Visit with Roro Chawla MD  [facility] ([facility] [address]  [address]  [facility] [id number] [phone]

## 2023-05-17 NOTE — TELEPHONE ENCOUNTER
Prescription approved per [facility] Refill Protocol.    Next 5 appointments (look out 90 days)    Aug 03, 2023 11:30 AM  (Arrive by 11:10 AM)  Adult Preventative Visit with Roro Chawla MD  [facility] ([facility] ) [address]  [address]  [facility] [address]  [phone]

## 2023-05-17 NOTE — TELEPHONE ENCOUNTER
[facility] Call Center    Phone Message    May a detailed message be left on voicemail: yes     Reason for Call: Other: Pt. Was told she must schedule in order to have meds refilled. Pt is scheduled for soonest available 09/15/2023. Pt is wondering if her medications can be filled now or what she is supposed to do if she cant get in until 09/15/2023. Please call pt to discuss. Thanks!     Action Taken: Other: Derm    Travel Screening: Not Applicable

## 2023-05-18 ENCOUNTER — TELEPHONE (OUTPATIENT)
Dept: CARDIOLOGY | Facility: CLINIC | Age: 53
End: 2023-05-18
Payer: COMMERCIAL

## 2023-05-18 DIAGNOSIS — R06.02 SOB (SHORTNESS OF BREATH): ICD-10-CM

## 2023-05-18 DIAGNOSIS — L73.2 HIDRADENITIS SUPPURATIVA: ICD-10-CM

## 2023-05-18 DIAGNOSIS — R07.2 PRECORDIAL PAIN: ICD-10-CM

## 2023-05-18 RX ORDER — SULFAMETHOXAZOLE/TRIMETHOPRIM 800-160 MG
1 TABLET ORAL 2 TIMES DAILY
Qty: 180 TABLET | Refills: 1 | Status: SHIPPED | OUTPATIENT
Start: 2023-05-18 | End: 2024-07-10

## 2023-05-18 RX ORDER — AMLODIPINE BESYLATE 2.5 MG/1
5 TABLET ORAL DAILY
Qty: 180 TABLET | Refills: 0 | Status: SHIPPED | OUTPATIENT
Start: 2023-05-18 | End: 2023-09-21

## 2023-05-18 NOTE — TELEPHONE ENCOUNTER
"Writer called patient and let her know we do have an appointment open tomorrow with Ashley, patient is busy though and has another appointment so unable    Sarah is wondering if Ashley could do a cassandra refill for the Bactrim to [facility] prior to her appointment in September.    Sarah is also wondering if there is a cream for the \"bumps on her stomach\" Ashley could give her in the meantime as well. Will route to provider for review.    Ibeth REDMAN RN  "

## 2023-05-19 DIAGNOSIS — R10.13 EPIGASTRIC PAIN: ICD-10-CM

## 2023-05-19 DIAGNOSIS — K44.9 HIATAL HERNIA: ICD-10-CM

## 2023-05-19 NOTE — TELEPHONE ENCOUNTER
Writer called patient and relayed Ashley's message down below via voicemail.     Ibeth REDMAN RN

## 2023-05-19 NOTE — TELEPHONE ENCOUNTER
Routing refill request to provider for review/approval because:  Patient has omeprazole 20 mg ordered.

## 2023-05-19 NOTE — TELEPHONE ENCOUNTER
Ashley Antonio, PHILLIP  You; Steve Sandoval 15 hours ago (4:54 PM)     BB  I will send a cassandra bactrim refill, as far as a cream for bumps - I cant recommend an Rx, but cetaphil/cerave are never a bad idea. Can you let her know? Thanks!     Ashley

## 2023-05-22 RX ORDER — OMEPRAZOLE 40 MG/1
CAPSULE, DELAYED RELEASE ORAL
Qty: 60 CAPSULE | Refills: 3 | Status: ON HOLD | OUTPATIENT
Start: 2023-05-22 | End: 2023-06-26

## 2023-05-23 DIAGNOSIS — R73.9 BLOOD GLUCOSE ELEVATED: ICD-10-CM

## 2023-05-25 ENCOUNTER — TELEPHONE (OUTPATIENT)
Dept: INTERNAL MEDICINE | Facility: CLINIC | Age: 53
End: 2023-05-25
Payer: COMMERCIAL

## 2023-05-25 NOTE — TELEPHONE ENCOUNTER
[facility]29[facility]23 for review and signature.  Put in Dr. Low's in basket.

## 2023-06-01 ENCOUNTER — TELEPHONE (OUTPATIENT)
Dept: INTERNAL MEDICINE | Facility: CLINIC | Age: 53
End: 2023-06-01
Payer: COMMERCIAL

## 2023-06-01 DIAGNOSIS — E11.9 TYPE 2 DIABETES MELLITUS WITHOUT COMPLICATION, WITHOUT LONG-TERM CURRENT USE OF INSULIN (H): Primary | ICD-10-CM

## 2023-06-01 NOTE — TELEPHONE ENCOUNTER
Patient calling  She is to receive a Accu Check guide from the company. She needs the lancets and stripes sent to [facility] in [facility] to call and  [phone]

## 2023-06-05 DIAGNOSIS — Z71.6 ENCOUNTER FOR SMOKING CESSATION COUNSELING: ICD-10-CM

## 2023-06-06 ENCOUNTER — TELEPHONE (OUTPATIENT)
Dept: INTERNAL MEDICINE | Facility: CLINIC | Age: 53
End: 2023-06-06
Payer: COMMERCIAL

## 2023-06-06 NOTE — LETTER
[facility]  [address], [address]  [place]  [address]          6/6/2023      Swetha Patterson  [address] [place] [id number]        Dear Swetha Patterson,    After reviewing your chart, we have discovered that we have not received the results of your last mammogram. If you have not had a mammogram within the last year please call our office  so that we may assist you with scheduling one.  If you are up to date with your mammogram please let us know where it was done so that we can complete your medical records.  If you have any questions please call us at [phone].        Sincerely,    [facility]

## 2023-06-06 NOTE — TELEPHONE ENCOUNTER
Patient Quality Outreach    Patient is due for the following:   Breast Cancer Screening - Mammogram    Next Steps:   Schedule a office visit for mammogram    Type of outreach:    Sent letter.    Next Steps:  Reach out within 90 days via Phone.    Max number of attempts reached: No. Will try again in 90 days if patient still on fail list.    Questions for provider review:    None           Shauna Ferrera

## 2023-06-07 ENCOUNTER — TRANSFERRED RECORDS (OUTPATIENT)
Dept: HEALTH INFORMATION MANAGEMENT | Facility: CLINIC | Age: 53
End: 2023-06-07
Payer: COMMERCIAL

## 2023-06-07 RX ORDER — BUPROPION HYDROCHLORIDE 150 MG/1
TABLET ORAL
Qty: 90 TABLET | Refills: 1 | Status: SHIPPED | OUTPATIENT
Start: 2023-06-07 | End: 2023-10-18

## 2023-06-08 ENCOUNTER — TELEPHONE (OUTPATIENT)
Dept: INTERNAL MEDICINE | Facility: CLINIC | Age: 53
End: 2023-06-08
Payer: COMMERCIAL

## 2023-06-08 NOTE — TELEPHONE ENCOUNTER
"Patient calls saying she does not want to go to the pain clinic because she does not want to become addicted to oxycodone.     She is asking for gabapentin for her fibromyalgia pain even tho she reported she is allergic to it. She stated \"all that happens is I itch and I can just take benadryl for that.\"      pls call patient and advise [phone]   "

## 2023-06-09 NOTE — TELEPHONE ENCOUNTER
Call to patient. States he legs hurt so bad but she doesn't want to take Oxycodone. States pain is from fibromyalgia. Advised. Scheduled.

## 2023-06-15 ENCOUNTER — NURSE TRIAGE (OUTPATIENT)
Dept: INTERNAL MEDICINE | Facility: CLINIC | Age: 53
End: 2023-06-15
Payer: COMMERCIAL

## 2023-06-15 NOTE — TELEPHONE ENCOUNTER
"Nurse Triage SBAR    Is this a 2nd Level Triage? NO    Situation: Pt has been having increased swelling bilaterally for a few days.     Background: Pain now for last 2-3 days in both legs. Swollen, can hardly walk, swelling is bad enough she can't see her ankle bones. Both legs are slightly red.  Extremely painful, rated 9-10.     Assessment: Unknown source of edema, possible DVT    Protocol Recommended Disposition:   Go To ED/UCC Now (Or To Office With PCP Approval)    Recommendation: Patient agrees to go to ED. Patient is going to call her sister for a ride.       Does the patient meet one of the following criteria for ADS visit consideration? 16+ years old, with an [facility] PCP     TIP  Providers, please consider if this condition is appropriate for management at one of our Acute and Diagnostic Services sites.     If patient is a good candidate, please use dotphrase <dot>triageresponse and select Refer to ADS to document.    Reason for Disposition    SEVERE swelling (e.g., swelling extends above knee, entire leg is swollen, weeping fluid)    Additional Information    Negative: Sounds like a life-threatening emergency to the triager    Negative: Chest pain    Negative: Small area of swelling and followed an insect bite to the area    Negative: Followed a knee injury    Negative: Ankle or foot injury    Negative: Pregnant with leg swelling or edema    Negative: Difficulty breathing at rest    Negative: Entire foot is cool or blue in comparison to other side    Answer Assessment - Initial Assessment Questions  1. ONSET: \"When did the swelling start?\" (e.g., minutes, hours, days)      Ongoing for a few days  2. LOCATION: \"What part of the leg is swollen?\"  \"Are both legs swollen or just one leg?\"      Both legs, painful  3. SEVERITY: \"How bad is the swelling?\" (e.g., localized; mild, moderate, severe)   - Localized - small area of swelling localized to one leg   - MILD pedal edema - swelling limited to foot and ankle, " "pitting edema < 1/4 inch (6 mm) deep, rest and elevation eliminate most or all swelling   - MODERATE edema - swelling of lower leg to knee, pitting edema > 1/4 inch (6 mm) deep, rest and elevation only partially reduce swelling   - SEVERE edema - swelling extends above knee, facial or hand swelling present       Mild to moderate edema, swelling in ankles bad enough that she cannot see ankle bones  4. REDNESS: \"Does the swelling look red or infected?\"      A little red  5. PAIN: \"Is the swelling painful to touch?\" If Yes, ask: \"How painful is it?\"   (Scale 1-10; mild, moderate or severe)      9-10 can barely walk  6. FEVER: \"Do you have a fever?\" If Yes, ask: \"What is it, how was it measured, and when did it start?\"       no  7. CAUSE: \"What do you think is causing the leg swelling?\"      no  8. MEDICAL HISTORY: \"Do you have a history of heart failure, kidney disease, liver failure, or cancer?\"      Fatty liver,  9. RECURRENT SYMPTOM: \"Have you had leg swelling before?\" If Yes, ask: \"When was the last time?\" \"What happened that time?\"      Yes, but not this bad, about 1 year ago  10. OTHER SYMPTOMS: \"Do you have any other symptoms?\" (e.g., chest pain, difficulty breathing)        Legs are cold, breathing ok  11. PREGNANCY: \"Is there any chance you are pregnant?\" \"When was your last menstrual period?\"        no    Protocols used: LEG SWELLING AND EDEMA-A-OH      "

## 2023-06-16 ENCOUNTER — HOSPITAL ENCOUNTER (OUTPATIENT)
Facility: CLINIC | Age: 53
End: 2023-06-16
Payer: COMMERCIAL

## 2023-06-20 ENCOUNTER — VIRTUAL VISIT (OUTPATIENT)
Dept: INTERNAL MEDICINE | Facility: CLINIC | Age: 53
End: 2023-06-20
Payer: COMMERCIAL

## 2023-06-20 DIAGNOSIS — R10.13 EPIGASTRIC PAIN: ICD-10-CM

## 2023-06-20 DIAGNOSIS — Z53.9 ERRONEOUS ENCOUNTER--DISREGARD: Primary | ICD-10-CM

## 2023-06-20 RX ORDER — SUCRALFATE 1 G/1
TABLET ORAL
Qty: 120 TABLET | Refills: 0 | Status: SHIPPED | OUTPATIENT
Start: 2023-06-20 | End: 2023-07-17

## 2023-06-20 NOTE — PROGRESS NOTES
Swetha is a 53 year old who is being evaluated via a billable video visit.      How would you like to obtain your AVS? Mail a copy  If the video visit is dropped, the invitation should be resent by: Text to cell phone: [phone]  Will anyone else be joining your video visit? No        This is a VIDEO ( using [employer])  encounter with the patient.     We could not connect on the video call.  I offered  an appointment later in the day.  The staff called multiple times to call for the appointment.  She did not return the course

## 2023-06-22 ENCOUNTER — OFFICE VISIT (OUTPATIENT)
Dept: INTERNAL MEDICINE | Facility: CLINIC | Age: 53
End: 2023-06-22
Payer: COMMERCIAL

## 2023-06-22 VITALS
DIASTOLIC BLOOD PRESSURE: 86 MMHG | HEIGHT: 63 IN | OXYGEN SATURATION: 96 % | RESPIRATION RATE: 18 BRPM | SYSTOLIC BLOOD PRESSURE: 137 MMHG | TEMPERATURE: 97.7 F | HEART RATE: 92 BPM | WEIGHT: 206 LBS | BODY MASS INDEX: 36.5 KG/M2

## 2023-06-22 DIAGNOSIS — M79.7 FIBROMYALGIA: ICD-10-CM

## 2023-06-22 DIAGNOSIS — J44.9 COPD, MODERATE (H): ICD-10-CM

## 2023-06-22 DIAGNOSIS — J30.89 DUST ALLERGY: ICD-10-CM

## 2023-06-22 DIAGNOSIS — J44.9 CHRONIC OBSTRUCTIVE PULMONARY DISEASE, UNSPECIFIED COPD TYPE (H): ICD-10-CM

## 2023-06-22 DIAGNOSIS — M54.2 NECK PAIN: ICD-10-CM

## 2023-06-22 DIAGNOSIS — E66.9 OBESITY (BMI 30-39.9): ICD-10-CM

## 2023-06-22 DIAGNOSIS — Z01.818 PRE-OP EXAM: Primary | ICD-10-CM

## 2023-06-22 PROCEDURE — 99214 OFFICE O/P EST MOD 30 MIN: CPT | Performed by: INTERNAL MEDICINE

## 2023-06-22 RX ORDER — MONTELUKAST SODIUM 10 MG/1
TABLET ORAL
Qty: 90 TABLET | Refills: 1 | Status: SHIPPED | OUTPATIENT
Start: 2023-06-22 | End: 2024-01-15

## 2023-06-22 RX ORDER — GABAPENTIN 100 MG/1
100 CAPSULE ORAL 3 TIMES DAILY
Qty: 90 CAPSULE | Refills: 4 | Status: SHIPPED | OUTPATIENT
Start: 2023-06-22 | End: 2023-08-29

## 2023-06-22 ASSESSMENT — PATIENT HEALTH QUESTIONNAIRE - PHQ9
SUM OF ALL RESPONSES TO PHQ QUESTIONS 1-9: 11
SUM OF ALL RESPONSES TO PHQ QUESTIONS 1-9: 11
10. IF YOU CHECKED OFF ANY PROBLEMS, HOW DIFFICULT HAVE THESE PROBLEMS MADE IT FOR YOU TO DO YOUR WORK, TAKE CARE OF THINGS AT HOME, OR GET ALONG WITH OTHER PEOPLE: SOMEWHAT DIFFICULT

## 2023-06-22 ASSESSMENT — PAIN SCALES - GENERAL: PAINLEVEL: EXTREME PAIN (8)

## 2023-06-22 NOTE — PATIENT INSTRUCTIONS
Plan:  1. In the morning of the MRI you may take all the meds   2. After you finished MRI start Gabapentin 100 mg 3 times a day

## 2023-06-22 NOTE — H&P (VIEW-ONLY)
[place] [address]  [address]  [facility] [address]  Phone: [phone]  Primary Provider: Roro Chawla  Pre-op Performing Provider: RORO CHAWLA      PREOPERATIVE EVALUATION:  Today's date: 6/22/2023    Swetha Patterson is a 53 year old female who presents for a preoperative evaluation.      6/22/2023     9:38 AM   Additional Questions   Roomed by Shauna Ferrera     Surgical Information:  Surgery/Procedure: MRI with and without contrast.  Surgery Location:[facility]    Surgeon: usman  Surgery Date: 06/26/2023  Time of Surgery: 12:00 PM  Where patient plans to recover: At home with family  Fax number for surgical facility:     1. No - Have you ever had a heart attack or stroke?  2. No - Have you ever had surgery on your heart or blood vessels, such as a stent, coronary (heart) bypass, or surgery on an artery in the head, neck, heart, or legs?  3. No - Do you have chest pain when you are physically active?  4. No - Do you have a history of heart failure?  5. No - Do you currently have a cold, bronchitis, or symptoms of other respiratory (head and chest) infections?  6. No - Do you have a cough, shortness of breath, or wheezing?  7. No - Do you or anyone in your family have a history of blood clots?  8. No - Do you or anyone in your family have a serious bleeding problem, such as long-lasting bleeding after surgeries or cuts?  9. No - Have you ever had anemia or been told to take iron pills?  10. No - Have you had any abnormal blood loss such as black, tarry or bloody stools, or abnormal vaginal bleeding?  11. No - Have you ever had a blood transfusion?  12. Yes - Are you willing to have a blood transfusion if it is medically needed before, during, or after your surgery?  13. No - Have you or anyone in your family ever had problems with anesthesia (sedation for surgery)?  14. No - Do you have sleep apnea, excessive  snoring, or daytime drowsiness?   15. No - Do you have any artifical heart valves or other implanted medical devices, such as a pacemaker, defibrillator, or continuous glucose monitor?  16. No - Do you have any artifical joints?  17. No - Are you allergic to latex?  18. No - Is there any chance that you may be pregnant?          6/22/2023     9:22 AM   Preop Questions   1. Have you ever had a heart attack or stroke? No   2. Have you ever had surgery on your heart or blood vessels, such as a stent placement, a coronary artery bypass, or surgery on an artery in your head, neck, heart, or legs? No   3. Do you have chest pain with activity? No   4. Do you have a history of  heart failure? No   5. Do you currently have a cold, bronchitis or symptoms of other infection? No       Health Care Directive:  Patient does not have a Health Care Directive or Living Will: Discussed advance care planning with patient; however, patient declined at this time.    Preoperative Review of :   reviewed - controlled substances prescribed by other outside provider(s).      CC:  Preop for multiple medical problems.    HPI:    The patient is scheduled for spine MRI. She is extremely claustrophobic and she requires sedation for MRI  No other acute complaints.    Assessment:  1. V72.83H Preop general physical exam _ I do not see any major contraindications for the patient to go through the scheduled surgery.    The proposed surgical procedure is considered  LOW  surgery risk.    For above listed surgery and anesthesia, Patient is at  MODERATE, risk for surgery/procedure and perioperative/procedure complications.    Cardiovascular risk  Assessment   -- low risk   -- No further cardiac work up is needed before this surgery.        ECG:    sinus rhythm, no changes suggestive for ischemia, prolonged QT    Pulmonary Risk Assessment  -- the patient has chr lung disease, it is stable. The patient will use inhalers/nebs before and after surgery.  "      Obstructive Sleep Apnea (or suspected sleep apnea)  -- not diagnosed with     Anemia Assessment :  -- no anemia - patient doesn't need further anemia work up      Blood Sugar Assessment  -- patient has prediabetes    Anticoagulation assessment  -- patient does not take anticoagulation meds        (M79.7) Fibromyalgia  Comment: We discussed about the new meds, advantages and potential side effects. The patient will read also the info from the pharmacy and call back if questions.   Plan: gabapentin (NEURONTIN) 100 MG capsule            (M54.2) Neck pain  Comment:   Plan: MRI    (E66.9) Obesity (BMI 30-39.9)  (J44.9) Chronic obstructive pulmonary disease, unspecified COPD type (H)  Comment: Since she moved out of her prior apt she feels much better with no COPD exacerbations with less prednisone use and she was able to loose wt and plans to continue to loos.e wt  Plan:        Plan:  1. In the morning of the MRI you may take all the meds   2. After you finished MRI start Gabapentin 100 mg 3 times a day      Physical exam:    Blood pressure 137/86, pulse 92, temperature 97.7  F (36.5  C), resp. rate 18, height 1.6 m (5' 3\"), weight 93.4 kg (206 lb), SpO2 96 %, not currently breastfeeding.   NAD, appears comfortable  Skin clear, no rashes  Neck: supple, no JVD,  no thyroidmegaly  Lymph nodes non palpable in the cervical, supraclavicular   Chest: clear to auscultation with good respiratory effort  Cardiac: S1S2, RRR, no mgr appreciated  Abdomen: soft, not tender, not distended, audible bowel sound, no hepatosplenomegaly, no palpable masses, no abdominal bruits  Extremities: no cyanosis, clubbing or edema.   Neuro: A, Ox3, no focal signs.        ROS:   as above     Patient Active Problem List   Diagnosis     PONV (postoperative nausea and vomiting)     COPD (chronic obstructive pulmonary disease) (H)     Obesity (BMI 30-39.9)     Bipolar disorder (H)     Benign essential hypertension     Pure hypercholesterolemia     " Hypothyroidism     Chronic abdominal pain     Chronic constipation     Impaired fasting glucose        Past Medical History:   Diagnosis Date     Benign essential hypertension      Bipolar disorder (H)      Chronic abdominal pain      Chronic constipation      COPD (chronic obstructive pulmonary disease) (H)      Hypothyroidism      Impaired fasting glucose      Obesity (BMI 30-39.9)      PONV (postoperative nausea and vomiting)      Pure hypercholesterolemia       Past Surgical History:   Procedure Laterality Date     ANESTHESIA OUT OF OR MRI N/A 10/07/2021    Procedure: ANESTHESIA OUT OF OR MRI;  Surgeon: GENERIC ANESTHESIA PROVIDER;  Location: [facility]     ANESTHESIA OUT OF OR MRI N/A 5/11/2023    Procedure: MRI OF NECK WITH AND WITHOUT CONTRAST;  Surgeon: GENERIC ANESTHESIA PROVIDER;  Location: [facility]     APPENDECTOMY       ARTHROSCOPY SHOULDER DECOMPRESSION Left 10/21/2015    Procedure: ARTHROSCOPY SHOULDER DECOMPRESSION;  Surgeon: Julien Milian MD;  Location: [facility]     BIOPSY ARTERY TEMPORAL Left 03/11/2020    Procedure: LEFT TEMPORAL ARTERY BIOPSY;  Surgeon: Ibeth Conner MD;  Location: [facility]     BRONCHIAL THERMOPLASTY N/A 11/14/2014    Procedure: BRONCHIAL THERMOPLASTY;  Surgeon: Ward Whitaker MD;  [facility]     BRONCHIAL THERMOPLASTY N/A 12/19/2014    Procedure: BRONCHIAL THERMOPLASTY;  Surgeon: Ward Whitaker MD;  [facility]     BRONCHIAL THERMOPLASTY N/A 02/06/2015    Procedure: BRONCHIAL THERMOPLASTY;  Surgeon: Ward Whitaker MD;  [facility]     COLONOSCOPY N/A 11/26/2018    Procedure: COLONOSCOPY (Former[facility]);  Surgeon: Marshall Oakes MD;  Location:  OR     COLONOSCOPY N/A 10/22/2020    Procedure: Colonoscopy;  Surgeon: Marshlal Mccormick MD;  Location: [facility] OR     COLONOSCOPY N/A 01/20/2023    Procedure: COLONOSCOPY, FLEXIBLE, WITH LESION REMOVAL USING SNARE;  Surgeon: Carson Domínguez MD;  Location:  GI     DISCECTOMY, FUSION CERVICAL ANTERIOR ONE  LEVEL, COMBINED N/A 05/01/2018    Procedure: COMBINED DISCECTOMY, FUSION CERVICAL ANTERIOR ONE LEVEL;  1.  C5-C6 anterior cervical diskectomy and fusion.    2.  C5-C6 application of intervertebral biomechanical device for interbody fusion purposes.    3.  C5-C6 anterior instrumentation using the standard Kristin InViZia 24 mm plate with four associated bone screws, 12 mm in length.  Inferior screws are fixed.  Superior screws are va     ESOPHAGOSCOPY, GASTROSCOPY, DUODENOSCOPY (EGD), COMBINED N/A 10/22/2020    Procedure: Esophagoscopy, gastroscopy, duodenoscopy with biopsies;  Surgeon: Marshall Mccormick MD;  Location:  OR     ESOPHAGOSCOPY, GASTROSCOPY, DUODENOSCOPY (EGD), COMBINED N/A 06/17/2021    Procedure: ESOPHAGOGASTRODUODENOSCOPY, WITH BIOPSY;  Surgeon: Darrel Damon MD;  Location:  GI     EXCISE MASS NECK Left 02/01/2023    Procedure: exploration of  NECK left;  Surgeon: Ibeth Conner MD;  Location:  OR     EXCISE MASS TRUNK Left 11/03/2021    Procedure: EXCISION LEFT SHOULDER LIPOMA;  Surgeon: Ibeth Conner MD;  Location:  OR     EXCISE NODE MEDIASTINAL  04/26/2013    Procedure: EXCISE NODE MEDIASTINAL;;  Surgeon: Av Peña MD;  Location:  OR     LAPAROSCOPIC CHOLECYSTECTOMY N/A 10/19/2017    Procedure: LAPAROSCOPIC CHOLECYSTECTOMY;  LAPAROSCOPIC CHOLECYSTECTOMY;  Surgeon: Ney Jerry MD;  Location:  OR     LARYNGOSCOPY, EXCISE VOCAL CORD LESION, COMBINED       THORACOSCOPY  04/26/2013    Procedure: THORACOSCOPY;  LEFT VIDEO ASSISTED THORACOSCOPY, RESECTION OF POSTERIOR MEDIASTINAL MASS;  Surgeon: Av Peña MD;  Location:  OR     TONSILLECTOMY & ADENOIDECTOMY          PSHx: No complications with prior surgeries or anesthesia     Soc Hx: No daily alcohol, no smoking     Family History   Problem Relation Age of Onset     Myocardial Infarction Mother      Hypertension Mother      Cerebrovascular Disease Mother      Hypertension Sister       Cerebrovascular Disease Sister      Hypertension Maternal Grandmother      Cerebrovascular Disease Maternal Grandmother      Hypertension Maternal Grandfather      Cerebrovascular Disease Maternal Grandfather      Diabetes Type 2  Maternal Grandfather       All: reviewed    Meds: reviewed  Current Outpatient Medications   Medication Sig Dispense Refill     acetaminophen (TYLENOL) 500 MG tablet Take 2 tablets (1,000 mg) by mouth every 6 hours as needed for pain       albuterol (PROAIR HFA/PROVENTIL HFA/VENTOLIN HFA) 108 (90 Base) MCG/ACT inhaler INHALE TWO PUFFS BY MOUTH EVERY 6 HOURS 18 g 0     albuterol (PROVENTIL) (2.5 MG/3ML) 0.083% neb solution INHALE ONE VIAL BY NEBULIZER EVERY 6 HOURS AS NEEDED FOR SHORTNESS OF BREATH OR WHEEZING Strength: (2.5 MG/3ML) 0.083% 75 mL 1     ALLERGY RELIEF CETIRIZINE 10 MG tablet TAKE ONE TABLET BY MOUTH EVERY DAY AS NEEDED 90 tablet 2     amLODIPine (NORVASC) 2.5 MG tablet Take 2 tablets (5 mg) by mouth daily Appointment required for further refills 180 tablet 0     atorvastatin (LIPITOR) 80 MG tablet TAKE ONE TABLET BY MOUTH EVERY DAY 90 tablet 0     benzoyl peroxide (ACNE-CLEAR) 10 % external gel Apply topically 2 times daily as needed 90 g 1     blood glucose (ACCU-CHEK ALLYSON PLUS) test strip USE TO TEST BLOOD SUGAR ONCE DAILY OR AS DIRECTED 100 strip 3     blood glucose (NO BRAND SPECIFIED) lancets standard Use to test blood sugar 1 times daily or as directed. 100 Lancet 0     blood glucose (NO BRAND SPECIFIED) lancets standard Use to test blood sugar 1 times daily or as directed. 100 each 3     blood glucose (NO BRAND SPECIFIED) test strip Use to test blood sugar 1 times daily or as directed. 100 strip 0     blood glucose (NO BRAND SPECIFIED) test strip Use to test blood sugar 1 times daily or as directed.  Dispense Accu-chek Allyson Plus or per patient insurance 100 strip 3     blood glucose monitoring (NO BRAND SPECIFIED) meter device kit Use to test blood sugar 1 times  daily or as directed.  Dispense Accu-chek Olinda Plus or per insurance preference 1 kit 0     blood glucose monitoring (NO BRAND SPECIFIED) meter device kit Use to test blood sugar 1 times daily or as directed. 1 kit 0     budesonide-formoterol (SYMBICORT) 160-4.5 MCG/ACT Inhaler Inhale 2 puffs into the lungs 2 times daily 30.6 g 1     buPROPion (WELLBUTRIN XL) 150 MG 24 hr tablet TAKE ONE TABLET BY MOUTH EVERY MORNING 90 tablet 1     cefdinir (OMNICEF) 300 MG capsule Take 1 capsule (300 mg) by mouth 2 times daily 10 capsule 0     clindamycin (CLINDAMAX) 1 % external gel Apply topically 2 times daily 60 g 3     clonazePAM (KLONOPIN) 0.5 MG tablet Take 0.5 mg by mouth daily as needed       clonazePAM (KLONOPIN) 1 MG tablet Take 1 tablet (1 mg) by mouth 2 times daily as needed for anxiety She needs to see her PCP to get more tablets 5 tablet 0     doxycycline hyclate (VIBRAMYCIN) 100 MG capsule Take 1 capsule (100 mg) by mouth 2 times daily 20 capsule 0     Emollient (CERAVE MOISTURIZING) CREA Externally apply 1 Application topically 2 times daily 453 g 11     fluticasone (FLONASE) 50 MCG/ACT nasal spray Spray 2 sprays in nostril daily 16 g 5     furosemide (LASIX) 20 MG tablet TAKE ONE TABLET BY MOUTH TWICE A  tablet 3     hydrocortisone 2.5 % cream APPLY TO THE AFFECTED AREA(S) TWO TIMES A DAY 30 g 1     hydrOXYzine (ATARAX) 50 MG tablet TAKE TWO TABLETS BY MOUTH THREE TIMES A DAY AS NEEDED FOR ANXIETY 70 tablet 9     hydrOXYzine (VISTARIL) 25 MG capsule Take 1 capsule (25 mg) by mouth every 6 hours as needed for other (pain adjunct) 30 capsule 0     ibuprofen (ADVIL/MOTRIN) 200 MG tablet Take 3 tablets (600 mg) by mouth every 6 hours as needed for moderate pain (4-6)       lamoTRIgine (LAMICTAL) 150 MG tablet Take 150 mg by mouth 2 times daily       levothyroxine (SYNTHROID/LEVOTHROID) 50 MCG tablet TAKE ONE TABLET BY MOUTH EVERY DAY 90 tablet 0     lisinopril (ZESTRIL) 20 MG tablet TAKE ONE TABLET BY MOUTH  EVERY DAY 90 tablet 0     metFORMIN (GLUCOPHAGE) 500 MG tablet TAKE ONE TABLET BY MOUTH EVERY DAY WITH BREAKFAST 90 tablet 0     methocarbamol (ROBAXIN) 500 MG tablet Take 1 tablet (500 mg) by mouth 4 times daily as needed for muscle spasms 10 tablet 0     minoxidil (ROGAINE) 5 % external solution apply per package directions to the scalp once daily 120 mL 3     montelukast (SINGULAIR) 10 MG tablet TAKE ONE TABLET BY MOUTH AT BEDTIME 90 tablet 1     multivitamin, therapeutic (THERA-VIT) TABS tablet Take 1 tablet by mouth daily       naltrexone (DEPADE/REVIA) 50 MG tablet Take 1/2 tablet once daily 1-2 hours prior to worst cravings for 1 week, then increase to 1 tablet daily as directed if tolerating 30 tablet 3     nitroGLYcerin (NITROSTAT) 0.4 MG sublingual tablet PLACE ONE TABLET UNDER THE TONGUE AT THE 1ST SIGN OF ATTACK. IF PAIN IS UNRELIEVED OR WORSENED 5 MINUTES AFTER 1ST DOSE, PROMPT MEDICAL ASSISTANCE IS NEEDED. MAY REPEAT EVERY 5 MINUTES UNTIL PAIN IS RELIEVED. MAX OF THREE DOSES 25 tablet 11     nystatin (MYCOSTATIN) 928644 UNIT/GM external powder APPLY TOPICALLY TWICE A DAY AS NEEDED SKIN RASH 45 g 9     omeprazole (PRILOSEC) 20 MG DR capsule TAKE ONE CAPSULE BY MOUTH TWICE A  capsule 1     omeprazole (PRILOSEC) 40 MG DR capsule TAKE ONE CAPSULE BY MOUTH TWICE A DAY FOR 1 MONTH THEN TAKE ONE CAPSULE BY MOUTH EVERY DAY 60 capsule 3     ondansetron (ZOFRAN-ODT) 4 MG ODT tab Take 1 tablet (4 mg) by mouth every 6 hours as needed for nausea 12 tablet 1     permethrin (LICE TREATMENT CREME RINSE) 1 % external liquid Apply topically from the neck down, leave on overnight (approx. 8 hours) and repeat 1 weeks later. 120 mL 1     polyethylene glycol (MIRALAX) 17 GM/Dose powder DISSOLVE 17 GRAMS (1 CAPFUL) AND DRINK BY MOUTH ONCE DAILY Strength: 17 GM/SCOOP 510 g 1     RYBELSUS 14 MG tablet TAKE ONE TABLET BY MOUTH EVERY DAY 30 tablet 3     sucralfate (CARAFATE) 1 GM tablet TAKE ONE TABLET BY MOUTH FOUR TIMES  A DAY AS NEEDED FOR NAUSEA 120 tablet 0     sulfamethoxazole-trimethoprim (BACTRIM DS) 800-160 MG tablet Take 1 tablet by mouth 2 times daily Take one tablet twice daily. For additional refills, please schedule a follow-up appointment. 180 tablet 1     terbinafine (LAMISIL) 1 % external cream APPLY TO AFFECTED AREA(S) TWO TIMES A DAY 30 g 2     triamcinolone (KENALOG) 0.025 % external ointment Apply topically 2 times daily 80 g 1     Vitamin D3 (CHOLECALCIFEROL) 125 MCG (5000 UT) tablet Take 1 tablet by mouth three times a week              Roro Low MD  Internal Medicine       Patient Active Problem List    Diagnosis Date Noted     PONV (postoperative nausea and vomiting) 05/07/2023     Priority: Medium     COPD (chronic obstructive pulmonary disease) (H) 05/07/2023     Priority: Medium     Obesity (BMI 30-39.9) 05/07/2023     Priority: Medium     Bipolar disorder (H) 05/07/2023     Priority: Medium     Benign essential hypertension 05/07/2023     Priority: Medium     Pure hypercholesterolemia 05/07/2023     Priority: Medium     Hypothyroidism 05/07/2023     Priority: Medium     Chronic abdominal pain 05/07/2023     Priority: Medium     Chronic constipation 05/07/2023     Priority: Medium     Impaired fasting glucose 05/07/2023     Priority: Medium      Past Medical History:   Diagnosis Date     Benign essential hypertension      Bipolar disorder (H)      Chronic abdominal pain      Chronic constipation      COPD (chronic obstructive pulmonary disease) (H)      Hypothyroidism      Impaired fasting glucose      Obesity (BMI 30-39.9)      PONV (postoperative nausea and vomiting)      Pure hypercholesterolemia      Past Surgical History:   Procedure Laterality Date     ANESTHESIA OUT OF OR MRI N/A 10/07/2021    Procedure: ANESTHESIA OUT OF OR MRI;  Surgeon: GENERIC ANESTHESIA PROVIDER;  Location:  OR     ANESTHESIA OUT OF OR MRI N/A 5/11/2023    Procedure: MRI OF NECK WITH AND WITHOUT CONTRAST;  Surgeon:  GENERIC ANESTHESIA PROVIDER;  [facility]: [facility]     APPENDECTOMY       ARTHROSCOPY SHOULDER DECOMPRESSION Left 10/21/2015    Procedure: ARTHROSCOPY SHOULDER DECOMPRESSION;  Surgeon: Julien Milian MD;  [facility]: [facility]     BIOPSY ARTERY TEMPORAL Left 03/11/2020    Procedure: LEFT TEMPORAL ARTERY BIOPSY;  Surgeon: Ibeth Conner MD;  [facility]: [facility]     BRONCHIAL THERMOPLASTY N/A 11/14/2014    Procedure: BRONCHIAL THERMOPLASTY;  Surgeon: Ward Whitaker MD;  [facility]     BRONCHIAL THERMOPLASTY N/A 12/19/2014    Procedure: BRONCHIAL THERMOPLASTY;  Surgeon: Ward Whitaker MD;  [facility]     BRONCHIAL THERMOPLASTY N/A 02/06/2015    Procedure: BRONCHIAL THERMOPLASTY;  Surgeon: Ward Whitaker MD;  [facility]     COLONOSCOPY N/A 11/26/2018    Procedure: COLONOSCOPY ([facility]);  Surgeon: Marshall Oakes MD;  Location:  OR     COLONOSCOPY N/A 10/22/2020    Procedure: Colonoscopy;  Surgeon: Marshall Mccormick MD;  Location:  OR     COLONOSCOPY N/A 01/20/2023    Procedure: COLONOSCOPY, FLEXIBLE, WITH LESION REMOVAL USING SNARE;  Surgeon: Carson Domínguez MD;  Location:  GI     DISCECTOMY, FUSION CERVICAL ANTERIOR ONE LEVEL, COMBINED N/A 05/01/2018    Procedure: COMBINED DISCECTOMY, FUSION CERVICAL ANTERIOR ONE LEVEL;  1.  C5-C6 anterior cervical diskectomy and fusion.    2.  C5-C6 application of intervertebral biomechanical device for interbody fusion purposes.    3.  C5-C6 anterior instrumentation using the standard Kristin InViZia 24 mm plate with four associated bone screws, 12 mm in length.  Inferior screws are fixed.  Superior screws are va     ESOPHAGOSCOPY, GASTROSCOPY, DUODENOSCOPY (EGD), COMBINED N/A 10/22/2020    Procedure: Esophagoscopy, gastroscopy, duodenoscopy with biopsies;  Surgeon: Marshall Mccormick MD;  Location:  OR     ESOPHAGOSCOPY, GASTROSCOPY, DUODENOSCOPY (EGD), COMBINED N/A 06/17/2021    Procedure: ESOPHAGOGASTRODUODENOSCOPY, WITH BIOPSY;   Surgeon: Darrel Damon MD;  [facility]     EXCISE MASS NECK Left 02/01/2023    Procedure: exploration of  NECK left;  Surgeon: Ibeth Conner MD;  [facility]: [facility]     EXCISE MASS TRUNK Left 11/03/2021    Procedure: EXCISION LEFT SHOULDER LIPOMA;  Surgeon: Ibeth Conner MD;  [facility]: [facility]     EXCISE NODE MEDIASTINAL  04/26/2013    Procedure: EXCISE NODE MEDIASTINAL;;  Surgeon: vA Peña MD;  [facility]     LAPAROSCOPIC CHOLECYSTECTOMY N/A 10/19/2017    Procedure: LAPAROSCOPIC CHOLECYSTECTOMY;  LAPAROSCOPIC CHOLECYSTECTOMY;  Surgeon: Ney Jerry MD;  [facility]: [facility]     LARYNGOSCOPY, EXCISE VOCAL CORD LESION, COMBINED       THORACOSCOPY  04/26/2013    Procedure: THORACOSCOPY;  LEFT VIDEO ASSISTED THORACOSCOPY, RESECTION OF POSTERIOR MEDIASTINAL MASS;  Surgeon: Av Peña MD;  Location: [facility]     TONSILLECTOMY & ADENOIDECTOMY       Current Outpatient Medications   Medication Sig Dispense Refill     acetaminophen (TYLENOL) 500 MG tablet Take 2 tablets (1,000 mg) by mouth every 6 hours as needed for pain       albuterol (PROAIR HFA/PROVENTIL HFA/VENTOLIN HFA) 108 (90 Base) MCG/ACT inhaler INHALE TWO PUFFS BY MOUTH EVERY 6 HOURS 18 g 0     albuterol (PROVENTIL) (2.5 MG/3ML) 0.083% neb solution INHALE ONE VIAL BY NEBULIZER EVERY 6 HOURS AS NEEDED FOR SHORTNESS OF BREATH OR WHEEZING Strength: (2.5 MG/3ML) 0.083% 75 mL 1     ALLERGY RELIEF CETIRIZINE 10 MG tablet TAKE ONE TABLET BY MOUTH EVERY DAY AS NEEDED 90 tablet 2     amLODIPine (NORVASC) 2.5 MG tablet Take 2 tablets (5 mg) by mouth daily Appointment required for further refills 180 tablet 0     atorvastatin (LIPITOR) 80 MG tablet TAKE ONE TABLET BY MOUTH EVERY DAY 90 tablet 0     benzoyl peroxide (ACNE-CLEAR) 10 % external gel Apply topically 2 times daily as needed 90 g 1     blood glucose (ACCU-CHEK ALLYSON PLUS) test strip USE TO TEST BLOOD SUGAR ONCE DAILY OR AS DIRECTED 100 strip 3     blood glucose (NO  BRAND SPECIFIED) lancets standard Use to test blood sugar 1 times daily or as directed. 100 Lancet 0     blood glucose (NO BRAND SPECIFIED) lancets standard Use to test blood sugar 1 times daily or as directed. 100 each 3     blood glucose (NO BRAND SPECIFIED) test strip Use to test blood sugar 1 times daily or as directed. 100 strip 0     blood glucose (NO BRAND SPECIFIED) test strip Use to test blood sugar 1 times daily or as directed.  Dispense Accu-chek Olinda Plus or per patient insurance 100 strip 3     blood glucose monitoring (NO BRAND SPECIFIED) meter device kit Use to test blood sugar 1 times daily or as directed.  Dispense Accu-chek Olinda Plus or per insurance preference 1 kit 0     blood glucose monitoring (NO BRAND SPECIFIED) meter device kit Use to test blood sugar 1 times daily or as directed. 1 kit 0     budesonide-formoterol (SYMBICORT) 160-4.5 MCG/ACT Inhaler Inhale 2 puffs into the lungs 2 times daily 30.6 g 1     buPROPion (WELLBUTRIN XL) 150 MG 24 hr tablet TAKE ONE TABLET BY MOUTH EVERY MORNING 90 tablet 1     cefdinir (OMNICEF) 300 MG capsule Take 1 capsule (300 mg) by mouth 2 times daily 10 capsule 0     clindamycin (CLINDAMAX) 1 % external gel Apply topically 2 times daily 60 g 3     clonazePAM (KLONOPIN) 0.5 MG tablet Take 0.5 mg by mouth daily as needed       clonazePAM (KLONOPIN) 1 MG tablet Take 1 tablet (1 mg) by mouth 2 times daily as needed for anxiety She needs to see her PCP to get more tablets 5 tablet 0     doxycycline hyclate (VIBRAMYCIN) 100 MG capsule Take 1 capsule (100 mg) by mouth 2 times daily 20 capsule 0     Emollient (CERAVE MOISTURIZING) CREA Externally apply 1 Application topically 2 times daily 453 g 11     fluticasone (FLONASE) 50 MCG/ACT nasal spray Spray 2 sprays in nostril daily 16 g 5     furosemide (LASIX) 20 MG tablet TAKE ONE TABLET BY MOUTH TWICE A  tablet 3     hydrocortisone 2.5 % cream APPLY TO THE AFFECTED AREA(S) TWO TIMES A DAY 30 g 1      hydrOXYzine (ATARAX) 50 MG tablet TAKE TWO TABLETS BY MOUTH THREE TIMES A DAY AS NEEDED FOR ANXIETY 70 tablet 9     hydrOXYzine (VISTARIL) 25 MG capsule Take 1 capsule (25 mg) by mouth every 6 hours as needed for other (pain adjunct) 30 capsule 0     ibuprofen (ADVIL/MOTRIN) 200 MG tablet Take 3 tablets (600 mg) by mouth every 6 hours as needed for moderate pain (4-6)       lamoTRIgine (LAMICTAL) 150 MG tablet Take 150 mg by mouth 2 times daily       levothyroxine (SYNTHROID/LEVOTHROID) 50 MCG tablet TAKE ONE TABLET BY MOUTH EVERY DAY 90 tablet 0     lisinopril (ZESTRIL) 20 MG tablet TAKE ONE TABLET BY MOUTH EVERY DAY 90 tablet 0     metFORMIN (GLUCOPHAGE) 500 MG tablet TAKE ONE TABLET BY MOUTH EVERY DAY WITH BREAKFAST 90 tablet 0     methocarbamol (ROBAXIN) 500 MG tablet Take 1 tablet (500 mg) by mouth 4 times daily as needed for muscle spasms 10 tablet 0     minoxidil (ROGAINE) 5 % external solution apply per package directions to the scalp once daily 120 mL 3     montelukast (SINGULAIR) 10 MG tablet TAKE ONE TABLET BY MOUTH AT BEDTIME 90 tablet 1     multivitamin, therapeutic (THERA-VIT) TABS tablet Take 1 tablet by mouth daily       naltrexone (DEPADE/REVIA) 50 MG tablet Take 1/2 tablet once daily 1-2 hours prior to worst cravings for 1 week, then increase to 1 tablet daily as directed if tolerating 30 tablet 3     nitroGLYcerin (NITROSTAT) 0.4 MG sublingual tablet PLACE ONE TABLET UNDER THE TONGUE AT THE 1ST SIGN OF ATTACK. IF PAIN IS UNRELIEVED OR WORSENED 5 MINUTES AFTER 1ST DOSE, PROMPT MEDICAL ASSISTANCE IS NEEDED. MAY REPEAT EVERY 5 MINUTES UNTIL PAIN IS RELIEVED. MAX OF THREE DOSES 25 tablet 11     nystatin (MYCOSTATIN) 064140 UNIT/GM external powder APPLY TOPICALLY TWICE A DAY AS NEEDED SKIN RASH 45 g 9     omeprazole (PRILOSEC) 20 MG DR capsule TAKE ONE CAPSULE BY MOUTH TWICE A  capsule 1     omeprazole (PRILOSEC) 40 MG DR capsule TAKE ONE CAPSULE BY MOUTH TWICE A DAY FOR 1 MONTH THEN TAKE ONE  CAPSULE BY MOUTH EVERY DAY 60 capsule 3     ondansetron (ZOFRAN-ODT) 4 MG ODT tab Take 1 tablet (4 mg) by mouth every 6 hours as needed for nausea 12 tablet 1     permethrin (LICE TREATMENT CREME RINSE) 1 % external liquid Apply topically from the neck down, leave on overnight (approx. 8 hours) and repeat 1 weeks later. 120 mL 1     polyethylene glycol (MIRALAX) 17 GM/Dose powder DISSOLVE 17 GRAMS (1 CAPFUL) AND DRINK BY MOUTH ONCE DAILY Strength: 17 GM/SCOOP 510 g 1     RYBELSUS 14 MG tablet TAKE ONE TABLET BY MOUTH EVERY DAY 30 tablet 3     sucralfate (CARAFATE) 1 GM tablet TAKE ONE TABLET BY MOUTH FOUR TIMES A DAY AS NEEDED FOR NAUSEA 120 tablet 0     sulfamethoxazole-trimethoprim (BACTRIM DS) 800-160 MG tablet Take 1 tablet by mouth 2 times daily Take one tablet twice daily. For additional refills, please schedule a follow-up appointment. 180 tablet 1     terbinafine (LAMISIL) 1 % external cream APPLY TO AFFECTED AREA(S) TWO TIMES A DAY 30 g 2     triamcinolone (KENALOG) 0.025 % external ointment Apply topically 2 times daily 80 g 1     Vitamin D3 (CHOLECALCIFEROL) 125 MCG (5000 UT) tablet Take 1 tablet by mouth three times a week         Allergies   Allergen Reactions     Codeine Nausea and Vomiting     Cyclobenzaprine Nausea     Gabapentin Rash     Hydrocodone Nausea and Vomiting        Social History     Tobacco Use     Smoking status: Former     Types: Cigarettes     Passive exposure: Past     Smokeless tobacco: Never   Substance Use Topics     Alcohol use: Not Currently       Recent Labs   Lab Test 04/12/23  0251 02/01/23  1414 02/01/23  1107 12/23/22  1937 08/17/22  0929 08/17/22  0929 01/24/22  1403   HGB 11.6*  --   --  11.7  --  13.1 13.9     --   --  297  --  339 379     --   --  138  --  139 135   POTASSIUM 3.9  --  4.8 4.2   < > 4.0 4.0   CR 0.81 1.08*  --  0.96*  --  0.80 0.85   A1C  --   --   --   --   --  5.5 5.8*    < > = values in this interval not displayed.          Signed  Electronically by: Roro Chawla MD  Copy of this evaluation report is provided to requesting physician.      Answers for HPI/ROS submitted by the patient on 6/22/2023  If you checked off any problems, how difficult have these problems made it for you to do your work, take care of things at home, or get along with other people?: Somewhat difficult  PHQ9 TOTAL SCORE: 11    -----------------------------

## 2023-06-22 NOTE — PROGRESS NOTES
[place] [address]  [address]  [facility] [address]  Phone: [phone]  Primary Provider: Roro Chawla  Pre-op Performing Provider: RORO CHWALA      PREOPERATIVE EVALUATION:  Today's date: 6/22/2023    Swetha Patterson is a 53 year old female who presents for a preoperative evaluation.      6/22/2023     9:38 AM   Additional Questions   Roomed by Shauna Ferrera     Surgical Information:  Surgery/Procedure: MRI with and without contrast.  Surgery Location:[facility]    Surgeon: usman  Surgery Date: 06/26/2023  Time of Surgery: 12:00 PM  Where patient plans to recover: At home with family  Fax number for surgical facility:     1. No - Have you ever had a heart attack or stroke?  2. No - Have you ever had surgery on your heart or blood vessels, such as a stent, coronary (heart) bypass, or surgery on an artery in the head, neck, heart, or legs?  3. No - Do you have chest pain when you are physically active?  4. No - Do you have a history of heart failure?  5. No - Do you currently have a cold, bronchitis, or symptoms of other respiratory (head and chest) infections?  6. No - Do you have a cough, shortness of breath, or wheezing?  7. No - Do you or anyone in your family have a history of blood clots?  8. No - Do you or anyone in your family have a serious bleeding problem, such as long-lasting bleeding after surgeries or cuts?  9. No - Have you ever had anemia or been told to take iron pills?  10. No - Have you had any abnormal blood loss such as black, tarry or bloody stools, or abnormal vaginal bleeding?  11. No - Have you ever had a blood transfusion?  12. Yes - Are you willing to have a blood transfusion if it is medically needed before, during, or after your surgery?  13. No - Have you or anyone in your family ever had problems with anesthesia (sedation for surgery)?  14. No - Do you have sleep apnea, excessive  snoring, or daytime drowsiness?   15. No - Do you have any artifical heart valves or other implanted medical devices, such as a pacemaker, defibrillator, or continuous glucose monitor?  16. No - Do you have any artifical joints?  17. No - Are you allergic to latex?  18. No - Is there any chance that you may be pregnant?          6/22/2023     9:22 AM   Preop Questions   1. Have you ever had a heart attack or stroke? No   2. Have you ever had surgery on your heart or blood vessels, such as a stent placement, a coronary artery bypass, or surgery on an artery in your head, neck, heart, or legs? No   3. Do you have chest pain with activity? No   4. Do you have a history of  heart failure? No   5. Do you currently have a cold, bronchitis or symptoms of other infection? No       Health Care Directive:  Patient does not have a Health Care Directive or Living Will: Discussed advance care planning with patient; however, patient declined at this time.    Preoperative Review of :   reviewed - controlled substances prescribed by other outside provider(s).      CC:  Preop for multiple medical problems.    HPI:    The patient is scheduled for spine MRI. She is extremely claustrophobic and she requires sedation for MRI  No other acute complaints.    Assessment:  1. V72.83H Preop general physical exam _ I do not see any major contraindications for the patient to go through the scheduled surgery.    The proposed surgical procedure is considered  LOW  surgery risk.    For above listed surgery and anesthesia, Patient is at  MODERATE, risk for surgery/procedure and perioperative/procedure complications.    Cardiovascular risk  Assessment   -- low risk   -- No further cardiac work up is needed before this surgery.        ECG:    sinus rhythm, no changes suggestive for ischemia, prolonged QT    Pulmonary Risk Assessment  -- the patient has chr lung disease, it is stable. The patient will use inhalers/nebs before and after surgery.  "      Obstructive Sleep Apnea (or suspected sleep apnea)  -- not diagnosed with     Anemia Assessment :  -- no anemia - patient doesn't need further anemia work up      Blood Sugar Assessment  -- patient has prediabetes    Anticoagulation assessment  -- patient does not take anticoagulation meds        (M79.7) Fibromyalgia  Comment: We discussed about the new meds, advantages and potential side effects. The patient will read also the info from the pharmacy and call back if questions.   Plan: gabapentin (NEURONTIN) 100 MG capsule            (M54.2) Neck pain  Comment:   Plan: MRI    (E66.9) Obesity (BMI 30-39.9)  (J44.9) Chronic obstructive pulmonary disease, unspecified COPD type (H)  Comment: Since she moved out of her prior apt she feels much better with no COPD exacerbations with less prednisone use and she was able to loose wt and plans to continue to loos.e wt  Plan:        Plan:  1. In the morning of the MRI you may take all the meds   2. After you finished MRI start Gabapentin 100 mg 3 times a day      Physical exam:    Blood pressure 137/86, pulse 92, temperature 97.7  F (36.5  C), resp. rate 18, height 1.6 m (5' 3\"), weight 93.4 kg (206 lb), SpO2 96 %, not currently breastfeeding.   NAD, appears comfortable  Skin clear, no rashes  Neck: supple, no JVD,  no thyroidmegaly  Lymph nodes non palpable in the cervical, supraclavicular   Chest: clear to auscultation with good respiratory effort  Cardiac: S1S2, RRR, no mgr appreciated  Abdomen: soft, not tender, not distended, audible bowel sound, no hepatosplenomegaly, no palpable masses, no abdominal bruits  Extremities: no cyanosis, clubbing or edema.   Neuro: A, Ox3, no focal signs.        ROS:   as above     Patient Active Problem List   Diagnosis     PONV (postoperative nausea and vomiting)     COPD (chronic obstructive pulmonary disease) (H)     Obesity (BMI 30-39.9)     Bipolar disorder (H)     Benign essential hypertension     Pure hypercholesterolemia     " Hypothyroidism     Chronic abdominal pain     Chronic constipation     Impaired fasting glucose        Past Medical History:   Diagnosis Date     Benign essential hypertension      Bipolar disorder (H)      Chronic abdominal pain      Chronic constipation      COPD (chronic obstructive pulmonary disease) (H)      Hypothyroidism      Impaired fasting glucose      Obesity (BMI 30-39.9)      PONV (postoperative nausea and vomiting)      Pure hypercholesterolemia       Past Surgical History:   Procedure Laterality Date     ANESTHESIA OUT OF OR MRI N/A 10/07/2021    Procedure: ANESTHESIA OUT OF OR MRI;  Surgeon: GENERIC ANESTHESIA PROVIDER;  Location: [facility] OR     ANESTHESIA OUT OF OR MRI N/A 5/11/2023    Procedure: MRI OF NECK WITH AND WITHOUT CONTRAST;  Surgeon: GENERIC ANESTHESIA PROVIDER;  Location: [facility] OR     APPENDECTOMY       ARTHROSCOPY SHOULDER DECOMPRESSION Left 10/21/2015    Procedure: ARTHROSCOPY SHOULDER DECOMPRESSION;  Surgeon: Julien Milian MD;  Location: [facility] OR     BIOPSY ARTERY TEMPORAL Left 03/11/2020    Procedure: LEFT TEMPORAL ARTERY BIOPSY;  Surgeon: Ibeth Conner MD;  Location: [facility]     BRONCHIAL THERMOPLASTY N/A 11/14/2014    Procedure: BRONCHIAL THERMOPLASTY;  Surgeon: Ward Whitaker MD;  [facility]     BRONCHIAL THERMOPLASTY N/A 12/19/2014    Procedure: BRONCHIAL THERMOPLASTY;  Surgeon: Ward Whitaker MD;  [facility]     BRONCHIAL THERMOPLASTY N/A 02/06/2015    Procedure: BRONCHIAL THERMOPLASTY;  Surgeon: Ward Whitaker MD;  [facility]     COLONOSCOPY N/A 11/26/2018    Procedure: COLONOSCOPY ([facility]);  Surgeon: Marshall Oakes MD;  Location:  OR     COLONOSCOPY N/A 10/22/2020    Procedure: Colonoscopy;  Surgeon: Marshall Mccormick MD;  Location: [facility]     COLONOSCOPY N/A 01/20/2023    Procedure: COLONOSCOPY, FLEXIBLE, WITH LESION REMOVAL USING SNARE;  Surgeon: Carson Domínguez MD;  Location:  GI     DISCECTOMY, FUSION CERVICAL ANTERIOR ONE  LEVEL, COMBINED N/A 05/01/2018    Procedure: COMBINED DISCECTOMY, FUSION CERVICAL ANTERIOR ONE LEVEL;  1.  C5-C6 anterior cervical diskectomy and fusion.    2.  C5-C6 application of intervertebral biomechanical device for interbody fusion purposes.    3.  C5-C6 anterior instrumentation using the standard Kristin InViZia 24 mm plate with four associated bone screws, 12 mm in length.  Inferior screws are fixed.  Superior screws are va     ESOPHAGOSCOPY, GASTROSCOPY, DUODENOSCOPY (EGD), COMBINED N/A 10/22/2020    Procedure: Esophagoscopy, gastroscopy, duodenoscopy with biopsies;  Surgeon: Marshall Mccormick MD;  Location:  OR     ESOPHAGOSCOPY, GASTROSCOPY, DUODENOSCOPY (EGD), COMBINED N/A 06/17/2021    Procedure: ESOPHAGOGASTRODUODENOSCOPY, WITH BIOPSY;  Surgeon: Darrel Damon MD;  Location:  GI     EXCISE MASS NECK Left 02/01/2023    Procedure: exploration of  NECK left;  Surgeon: Ibeth Conner MD;  Location:  OR     EXCISE MASS TRUNK Left 11/03/2021    Procedure: EXCISION LEFT SHOULDER LIPOMA;  Surgeon: Ibeth Conner MD;  Location:  OR     EXCISE NODE MEDIASTINAL  04/26/2013    Procedure: EXCISE NODE MEDIASTINAL;;  Surgeon: Av Peña MD;  Location:  OR     LAPAROSCOPIC CHOLECYSTECTOMY N/A 10/19/2017    Procedure: LAPAROSCOPIC CHOLECYSTECTOMY;  LAPAROSCOPIC CHOLECYSTECTOMY;  Surgeon: Ney Jerry MD;  Location:  OR     LARYNGOSCOPY, EXCISE VOCAL CORD LESION, COMBINED       THORACOSCOPY  04/26/2013    Procedure: THORACOSCOPY;  LEFT VIDEO ASSISTED THORACOSCOPY, RESECTION OF POSTERIOR MEDIASTINAL MASS;  Surgeon: Av Peña MD;  Location:  OR     TONSILLECTOMY & ADENOIDECTOMY          PSHx: No complications with prior surgeries or anesthesia     Soc Hx: No daily alcohol, no smoking     Family History   Problem Relation Age of Onset     Myocardial Infarction Mother      Hypertension Mother      Cerebrovascular Disease Mother      Hypertension Sister       Cerebrovascular Disease Sister      Hypertension Maternal Grandmother      Cerebrovascular Disease Maternal Grandmother      Hypertension Maternal Grandfather      Cerebrovascular Disease Maternal Grandfather      Diabetes Type 2  Maternal Grandfather       All: reviewed    Meds: reviewed  Current Outpatient Medications   Medication Sig Dispense Refill     acetaminophen (TYLENOL) 500 MG tablet Take 2 tablets (1,000 mg) by mouth every 6 hours as needed for pain       albuterol (PROAIR HFA/PROVENTIL HFA/VENTOLIN HFA) 108 (90 Base) MCG/ACT inhaler INHALE TWO PUFFS BY MOUTH EVERY 6 HOURS 18 g 0     albuterol (PROVENTIL) (2.5 MG/3ML) 0.083% neb solution INHALE ONE VIAL BY NEBULIZER EVERY 6 HOURS AS NEEDED FOR SHORTNESS OF BREATH OR WHEEZING Strength: (2.5 MG/3ML) 0.083% 75 mL 1     ALLERGY RELIEF CETIRIZINE 10 MG tablet TAKE ONE TABLET BY MOUTH EVERY DAY AS NEEDED 90 tablet 2     amLODIPine (NORVASC) 2.5 MG tablet Take 2 tablets (5 mg) by mouth daily Appointment required for further refills 180 tablet 0     atorvastatin (LIPITOR) 80 MG tablet TAKE ONE TABLET BY MOUTH EVERY DAY 90 tablet 0     benzoyl peroxide (ACNE-CLEAR) 10 % external gel Apply topically 2 times daily as needed 90 g 1     blood glucose (ACCU-CHEK ALLYSON PLUS) test strip USE TO TEST BLOOD SUGAR ONCE DAILY OR AS DIRECTED 100 strip 3     blood glucose (NO BRAND SPECIFIED) lancets standard Use to test blood sugar 1 times daily or as directed. 100 Lancet 0     blood glucose (NO BRAND SPECIFIED) lancets standard Use to test blood sugar 1 times daily or as directed. 100 each 3     blood glucose (NO BRAND SPECIFIED) test strip Use to test blood sugar 1 times daily or as directed. 100 strip 0     blood glucose (NO BRAND SPECIFIED) test strip Use to test blood sugar 1 times daily or as directed.  Dispense Accu-chek Allyson Plus or per patient insurance 100 strip 3     blood glucose monitoring (NO BRAND SPECIFIED) meter device kit Use to test blood sugar 1 times  daily or as directed.  Dispense Accu-chek Olinda Plus or per insurance preference 1 kit 0     blood glucose monitoring (NO BRAND SPECIFIED) meter device kit Use to test blood sugar 1 times daily or as directed. 1 kit 0     budesonide-formoterol (SYMBICORT) 160-4.5 MCG/ACT Inhaler Inhale 2 puffs into the lungs 2 times daily 30.6 g 1     buPROPion (WELLBUTRIN XL) 150 MG 24 hr tablet TAKE ONE TABLET BY MOUTH EVERY MORNING 90 tablet 1     cefdinir (OMNICEF) 300 MG capsule Take 1 capsule (300 mg) by mouth 2 times daily 10 capsule 0     clindamycin (CLINDAMAX) 1 % external gel Apply topically 2 times daily 60 g 3     clonazePAM (KLONOPIN) 0.5 MG tablet Take 0.5 mg by mouth daily as needed       clonazePAM (KLONOPIN) 1 MG tablet Take 1 tablet (1 mg) by mouth 2 times daily as needed for anxiety She needs to see her PCP to get more tablets 5 tablet 0     doxycycline hyclate (VIBRAMYCIN) 100 MG capsule Take 1 capsule (100 mg) by mouth 2 times daily 20 capsule 0     Emollient (CERAVE MOISTURIZING) CREA Externally apply 1 Application topically 2 times daily 453 g 11     fluticasone (FLONASE) 50 MCG/ACT nasal spray Spray 2 sprays in nostril daily 16 g 5     furosemide (LASIX) 20 MG tablet TAKE ONE TABLET BY MOUTH TWICE A  tablet 3     hydrocortisone 2.5 % cream APPLY TO THE AFFECTED AREA(S) TWO TIMES A DAY 30 g 1     hydrOXYzine (ATARAX) 50 MG tablet TAKE TWO TABLETS BY MOUTH THREE TIMES A DAY AS NEEDED FOR ANXIETY 70 tablet 9     hydrOXYzine (VISTARIL) 25 MG capsule Take 1 capsule (25 mg) by mouth every 6 hours as needed for other (pain adjunct) 30 capsule 0     ibuprofen (ADVIL/MOTRIN) 200 MG tablet Take 3 tablets (600 mg) by mouth every 6 hours as needed for moderate pain (4-6)       lamoTRIgine (LAMICTAL) 150 MG tablet Take 150 mg by mouth 2 times daily       levothyroxine (SYNTHROID/LEVOTHROID) 50 MCG tablet TAKE ONE TABLET BY MOUTH EVERY DAY 90 tablet 0     lisinopril (ZESTRIL) 20 MG tablet TAKE ONE TABLET BY MOUTH  EVERY DAY 90 tablet 0     metFORMIN (GLUCOPHAGE) 500 MG tablet TAKE ONE TABLET BY MOUTH EVERY DAY WITH BREAKFAST 90 tablet 0     methocarbamol (ROBAXIN) 500 MG tablet Take 1 tablet (500 mg) by mouth 4 times daily as needed for muscle spasms 10 tablet 0     minoxidil (ROGAINE) 5 % external solution apply per package directions to the scalp once daily 120 mL 3     montelukast (SINGULAIR) 10 MG tablet TAKE ONE TABLET BY MOUTH AT BEDTIME 90 tablet 1     multivitamin, therapeutic (THERA-VIT) TABS tablet Take 1 tablet by mouth daily       naltrexone (DEPADE/REVIA) 50 MG tablet Take 1/2 tablet once daily 1-2 hours prior to worst cravings for 1 week, then increase to 1 tablet daily as directed if tolerating 30 tablet 3     nitroGLYcerin (NITROSTAT) 0.4 MG sublingual tablet PLACE ONE TABLET UNDER THE TONGUE AT THE 1ST SIGN OF ATTACK. IF PAIN IS UNRELIEVED OR WORSENED 5 MINUTES AFTER 1ST DOSE, PROMPT MEDICAL ASSISTANCE IS NEEDED. MAY REPEAT EVERY 5 MINUTES UNTIL PAIN IS RELIEVED. MAX OF THREE DOSES 25 tablet 11     nystatin (MYCOSTATIN) 587167 UNIT/GM external powder APPLY TOPICALLY TWICE A DAY AS NEEDED SKIN RASH 45 g 9     omeprazole (PRILOSEC) 20 MG DR capsule TAKE ONE CAPSULE BY MOUTH TWICE A  capsule 1     omeprazole (PRILOSEC) 40 MG DR capsule TAKE ONE CAPSULE BY MOUTH TWICE A DAY FOR 1 MONTH THEN TAKE ONE CAPSULE BY MOUTH EVERY DAY 60 capsule 3     ondansetron (ZOFRAN-ODT) 4 MG ODT tab Take 1 tablet (4 mg) by mouth every 6 hours as needed for nausea 12 tablet 1     permethrin (LICE TREATMENT CREME RINSE) 1 % external liquid Apply topically from the neck down, leave on overnight (approx. 8 hours) and repeat 1 weeks later. 120 mL 1     polyethylene glycol (MIRALAX) 17 GM/Dose powder DISSOLVE 17 GRAMS (1 CAPFUL) AND DRINK BY MOUTH ONCE DAILY Strength: 17 GM/SCOOP 510 g 1     RYBELSUS 14 MG tablet TAKE ONE TABLET BY MOUTH EVERY DAY 30 tablet 3     sucralfate (CARAFATE) 1 GM tablet TAKE ONE TABLET BY MOUTH FOUR TIMES  A DAY AS NEEDED FOR NAUSEA 120 tablet 0     sulfamethoxazole-trimethoprim (BACTRIM DS) 800-160 MG tablet Take 1 tablet by mouth 2 times daily Take one tablet twice daily. For additional refills, please schedule a follow-up appointment. 180 tablet 1     terbinafine (LAMISIL) 1 % external cream APPLY TO AFFECTED AREA(S) TWO TIMES A DAY 30 g 2     triamcinolone (KENALOG) 0.025 % external ointment Apply topically 2 times daily 80 g 1     Vitamin D3 (CHOLECALCIFEROL) 125 MCG (5000 UT) tablet Take 1 tablet by mouth three times a week              Roro Low MD  Internal Medicine       Patient Active Problem List    Diagnosis Date Noted     PONV (postoperative nausea and vomiting) 05/07/2023     Priority: Medium     COPD (chronic obstructive pulmonary disease) (H) 05/07/2023     Priority: Medium     Obesity (BMI 30-39.9) 05/07/2023     Priority: Medium     Bipolar disorder (H) 05/07/2023     Priority: Medium     Benign essential hypertension 05/07/2023     Priority: Medium     Pure hypercholesterolemia 05/07/2023     Priority: Medium     Hypothyroidism 05/07/2023     Priority: Medium     Chronic abdominal pain 05/07/2023     Priority: Medium     Chronic constipation 05/07/2023     Priority: Medium     Impaired fasting glucose 05/07/2023     Priority: Medium      Past Medical History:   Diagnosis Date     Benign essential hypertension      Bipolar disorder (H)      Chronic abdominal pain      Chronic constipation      COPD (chronic obstructive pulmonary disease) (H)      Hypothyroidism      Impaired fasting glucose      Obesity (BMI 30-39.9)      PONV (postoperative nausea and vomiting)      Pure hypercholesterolemia      Past Surgical History:   Procedure Laterality Date     ANESTHESIA OUT OF OR MRI N/A 10/07/2021    Procedure: ANESTHESIA OUT OF OR MRI;  Surgeon: GENERIC ANESTHESIA PROVIDER;  Location:  OR     ANESTHESIA OUT OF OR MRI N/A 5/11/2023    Procedure: MRI OF NECK WITH AND WITHOUT CONTRAST;  Surgeon:  GENERIC ANESTHESIA PROVIDER;  Location: [facility]     APPENDECTOMY       ARTHROSCOPY SHOULDER DECOMPRESSION Left 10/21/2015    Procedure: ARTHROSCOPY SHOULDER DECOMPRESSION;  Surgeon: Julien Milian MD;  [facility]: [facility]     BIOPSY ARTERY TEMPORAL Left 03/11/2020    Procedure: LEFT TEMPORAL ARTERY BIOPSY;  Surgeon: Ibeth Conner MD;  [facility]: [facility]     BRONCHIAL THERMOPLASTY N/A 11/14/2014    Procedure: BRONCHIAL THERMOPLASTY;  Surgeon: Ward Whitaker MD;  [facility]     BRONCHIAL THERMOPLASTY N/A 12/19/2014    Procedure: BRONCHIAL THERMOPLASTY;  Surgeon: Ward Whitaker MD;  [facility]     BRONCHIAL THERMOPLASTY N/A 02/06/2015    Procedure: BRONCHIAL THERMOPLASTY;  Surgeon: Ward Whitaker MD;  [facility]     COLONOSCOPY N/A 11/26/2018    Procedure: [facility]OSCOPY ([facility]);  Surgeon: Marshall Oakes MD;  Location:  OR     COLONOSCOPY N/A 10/22/2020    Procedure: Colonoscopy;  Surgeon: Marshall Mccormick MD;  Location:  OR     COLONOSCOPY N/A 01/20/2023    Procedure: COLONOSCOPY, FLEXIBLE, WITH LESION REMOVAL USING SNARE;  Surgeon: Carson Domínguez MD;  Location:  GI     DISCECTOMY, FUSION CERVICAL ANTERIOR ONE LEVEL, COMBINED N/A 05/01/2018    Procedure: COMBINED DISCECTOMY, FUSION CERVICAL ANTERIOR ONE LEVEL;  1.  C5-C6 anterior cervical diskectomy and fusion.    2.  C5-C6 application of intervertebral biomechanical device for interbody fusion purposes.    3.  C5-C6 anterior instrumentation using the standard Kristin InViZia 24 mm plate with four associated bone screws, 12 mm in length.  Inferior screws are fixed.  Superior screws are va     ESOPHAGOSCOPY, GASTROSCOPY, DUODENOSCOPY (EGD), COMBINED N/A 10/22/2020    Procedure: Esophagoscopy, gastroscopy, duodenoscopy with biopsies;  Surgeon: Marshall Mccormick MD;  Location:  OR     ESOPHAGOSCOPY, GASTROSCOPY, DUODENOSCOPY (EGD), COMBINED N/A 06/17/2021    Procedure: ESOPHAGOGASTRODUODENOSCOPY, WITH BIOPSY;   Surgeon: Darrel Damon MD;  [facility]: [facility]     EXCISE MASS NECK Left 02/01/2023    Procedure: exploration of  NECK left;  Surgeon: Ibeth Conner MD;  [facility]: [facility]     EXCISE MASS TRUNK Left 11/03/2021    Procedure: EXCISION LEFT SHOULDER LIPOMA;  Surgeon: Ibeth Conner MD;  [facility]: [facility]     EXCISE NODE MEDIASTINAL  04/26/2013    Procedure: EXCISE NODE MEDIASTINAL;;  Surgeon: Av Peña MD;  [facility]: [facility]     LAPAROSCOPIC CHOLECYSTECTOMY N/A 10/19/2017    Procedure: LAPAROSCOPIC CHOLECYSTECTOMY;  LAPAROSCOPIC CHOLECYSTECTOMY;  Surgeon: Ney Jerry MD;  Location: [facility]     LARYNGOSCOPY, EXCISE VOCAL CORD LESION, COMBINED       THORACOSCOPY  04/26/2013    Procedure: THORACOSCOPY;  LEFT VIDEO ASSISTED THORACOSCOPY, RESECTION OF POSTERIOR MEDIASTINAL MASS;  Surgeon: Av Peña MD;  Location: [facility]     TONSILLECTOMY & ADENOIDECTOMY       Current Outpatient Medications   Medication Sig Dispense Refill     acetaminophen (TYLENOL) 500 MG tablet Take 2 tablets (1,000 mg) by mouth every 6 hours as needed for pain       albuterol (PROAIR HFA/PROVENTIL HFA/VENTOLIN HFA) 108 (90 Base) MCG/ACT inhaler INHALE TWO PUFFS BY MOUTH EVERY 6 HOURS 18 g 0     albuterol (PROVENTIL) (2.5 MG/3ML) 0.083% neb solution INHALE ONE VIAL BY NEBULIZER EVERY 6 HOURS AS NEEDED FOR SHORTNESS OF BREATH OR WHEEZING Strength: (2.5 MG/3ML) 0.083% 75 mL 1     ALLERGY RELIEF CETIRIZINE 10 MG tablet TAKE ONE TABLET BY MOUTH EVERY DAY AS NEEDED 90 tablet 2     amLODIPine (NORVASC) 2.5 MG tablet Take 2 tablets (5 mg) by mouth daily Appointment required for further refills 180 tablet 0     atorvastatin (LIPITOR) 80 MG tablet TAKE ONE TABLET BY MOUTH EVERY DAY 90 tablet 0     benzoyl peroxide (ACNE-CLEAR) 10 % external gel Apply topically 2 times daily as needed 90 g 1     blood glucose (ACCU-CHEK ALLYSON PLUS) test strip USE TO TEST BLOOD SUGAR ONCE DAILY OR AS DIRECTED 100 strip 3     blood glucose (NO  BRAND SPECIFIED) lancets standard Use to test blood sugar 1 times daily or as directed. 100 Lancet 0     blood glucose (NO BRAND SPECIFIED) lancets standard Use to test blood sugar 1 times daily or as directed. 100 each 3     blood glucose (NO BRAND SPECIFIED) test strip Use to test blood sugar 1 times daily or as directed. 100 strip 0     blood glucose (NO BRAND SPECIFIED) test strip Use to test blood sugar 1 times daily or as directed.  Dispense Accu-chek Olinda Plus or per patient insurance 100 strip 3     blood glucose monitoring (NO BRAND SPECIFIED) meter device kit Use to test blood sugar 1 times daily or as directed.  Dispense Accu-chek Olinda Plus or per insurance preference 1 kit 0     blood glucose monitoring (NO BRAND SPECIFIED) meter device kit Use to test blood sugar 1 times daily or as directed. 1 kit 0     budesonide-formoterol (SYMBICORT) 160-4.5 MCG/ACT Inhaler Inhale 2 puffs into the lungs 2 times daily 30.6 g 1     buPROPion (WELLBUTRIN XL) 150 MG 24 hr tablet TAKE ONE TABLET BY MOUTH EVERY MORNING 90 tablet 1     cefdinir (OMNICEF) 300 MG capsule Take 1 capsule (300 mg) by mouth 2 times daily 10 capsule 0     clindamycin (CLINDAMAX) 1 % external gel Apply topically 2 times daily 60 g 3     clonazePAM (KLONOPIN) 0.5 MG tablet Take 0.5 mg by mouth daily as needed       clonazePAM (KLONOPIN) 1 MG tablet Take 1 tablet (1 mg) by mouth 2 times daily as needed for anxiety She needs to see her PCP to get more tablets 5 tablet 0     doxycycline hyclate (VIBRAMYCIN) 100 MG capsule Take 1 capsule (100 mg) by mouth 2 times daily 20 capsule 0     Emollient (CERAVE MOISTURIZING) CREA Externally apply 1 Application topically 2 times daily 453 g 11     fluticasone (FLONASE) 50 MCG/ACT nasal spray Spray 2 sprays in nostril daily 16 g 5     furosemide (LASIX) 20 MG tablet TAKE ONE TABLET BY MOUTH TWICE A  tablet 3     hydrocortisone 2.5 % cream APPLY TO THE AFFECTED AREA(S) TWO TIMES A DAY 30 g 1      hydrOXYzine (ATARAX) 50 MG tablet TAKE TWO TABLETS BY MOUTH THREE TIMES A DAY AS NEEDED FOR ANXIETY 70 tablet 9     hydrOXYzine (VISTARIL) 25 MG capsule Take 1 capsule (25 mg) by mouth every 6 hours as needed for other (pain adjunct) 30 capsule 0     ibuprofen (ADVIL/MOTRIN) 200 MG tablet Take 3 tablets (600 mg) by mouth every 6 hours as needed for moderate pain (4-6)       lamoTRIgine (LAMICTAL) 150 MG tablet Take 150 mg by mouth 2 times daily       levothyroxine (SYNTHROID/LEVOTHROID) 50 MCG tablet TAKE ONE TABLET BY MOUTH EVERY DAY 90 tablet 0     lisinopril (ZESTRIL) 20 MG tablet TAKE ONE TABLET BY MOUTH EVERY DAY 90 tablet 0     metFORMIN (GLUCOPHAGE) 500 MG tablet TAKE ONE TABLET BY MOUTH EVERY DAY WITH BREAKFAST 90 tablet 0     methocarbamol (ROBAXIN) 500 MG tablet Take 1 tablet (500 mg) by mouth 4 times daily as needed for muscle spasms 10 tablet 0     minoxidil (ROGAINE) 5 % external solution apply per package directions to the scalp once daily 120 mL 3     montelukast (SINGULAIR) 10 MG tablet TAKE ONE TABLET BY MOUTH AT BEDTIME 90 tablet 1     multivitamin, therapeutic (THERA-VIT) TABS tablet Take 1 tablet by mouth daily       naltrexone (DEPADE/REVIA) 50 MG tablet Take 1/2 tablet once daily 1-2 hours prior to worst cravings for 1 week, then increase to 1 tablet daily as directed if tolerating 30 tablet 3     nitroGLYcerin (NITROSTAT) 0.4 MG sublingual tablet PLACE ONE TABLET UNDER THE TONGUE AT THE 1ST SIGN OF ATTACK. IF PAIN IS UNRELIEVED OR WORSENED 5 MINUTES AFTER 1ST DOSE, PROMPT MEDICAL ASSISTANCE IS NEEDED. MAY REPEAT EVERY 5 MINUTES UNTIL PAIN IS RELIEVED. MAX OF THREE DOSES 25 tablet 11     nystatin (MYCOSTATIN) 423001 UNIT/GM external powder APPLY TOPICALLY TWICE A DAY AS NEEDED SKIN RASH 45 g 9     omeprazole (PRILOSEC) 20 MG DR capsule TAKE ONE CAPSULE BY MOUTH TWICE A  capsule 1     omeprazole (PRILOSEC) 40 MG DR capsule TAKE ONE CAPSULE BY MOUTH TWICE A DAY FOR 1 MONTH THEN TAKE ONE  CAPSULE BY MOUTH EVERY DAY 60 capsule 3     ondansetron (ZOFRAN-ODT) 4 MG ODT tab Take 1 tablet (4 mg) by mouth every 6 hours as needed for nausea 12 tablet 1     permethrin (LICE TREATMENT CREME RINSE) 1 % external liquid Apply topically from the neck down, leave on overnight (approx. 8 hours) and repeat 1 weeks later. 120 mL 1     polyethylene glycol (MIRALAX) 17 GM/Dose powder DISSOLVE 17 GRAMS (1 CAPFUL) AND DRINK BY MOUTH ONCE DAILY Strength: 17 GM/SCOOP 510 g 1     RYBELSUS 14 MG tablet TAKE ONE TABLET BY MOUTH EVERY DAY 30 tablet 3     sucralfate (CARAFATE) 1 GM tablet TAKE ONE TABLET BY MOUTH FOUR TIMES A DAY AS NEEDED FOR NAUSEA 120 tablet 0     sulfamethoxazole-trimethoprim (BACTRIM DS) 800-160 MG tablet Take 1 tablet by mouth 2 times daily Take one tablet twice daily. For additional refills, please schedule a follow-up appointment. 180 tablet 1     terbinafine (LAMISIL) 1 % external cream APPLY TO AFFECTED AREA(S) TWO TIMES A DAY 30 g 2     triamcinolone (KENALOG) 0.025 % external ointment Apply topically 2 times daily 80 g 1     Vitamin D3 (CHOLECALCIFEROL) 125 MCG (5000 UT) tablet Take 1 tablet by mouth three times a week         Allergies   Allergen Reactions     Codeine Nausea and Vomiting     Cyclobenzaprine Nausea     Gabapentin Rash     Hydrocodone Nausea and Vomiting        Social History     Tobacco Use     Smoking status: Former     Types: Cigarettes     Passive exposure: Past     Smokeless tobacco: Never   Substance Use Topics     Alcohol use: Not Currently       Recent Labs   Lab Test 04/12/23  0251 02/01/23  1414 02/01/23  1107 12/23/22  1937 08/17/22  0929 08/17/22  0929 01/24/22  1403   HGB 11.6*  --   --  11.7  --  13.1 13.9     --   --  297  --  339 379     --   --  138  --  139 135   POTASSIUM 3.9  --  4.8 4.2   < > 4.0 4.0   CR 0.81 1.08*  --  0.96*  --  0.80 0.85   A1C  --   --   --   --   --  5.5 5.8*    < > = values in this interval not displayed.          Signed  Electronically by: Roro Chawla MD  Copy of this evaluation report is provided to requesting physician.      Answers for HPI/ROS submitted by the patient on 6/22/2023  If you checked off any problems, how difficult have these problems made it for you to do your work, take care of things at home, or get along with other people?: Somewhat difficult  PHQ9 TOTAL SCORE: 11    -----------------------------         urine dip SG 1025 ph 6.5, hardik mod, ket 15, blood large, prot > 300, Nit Positive SENT UA and urine cx

## 2023-06-23 ENCOUNTER — HOSPITAL ENCOUNTER (OUTPATIENT)
Dept: GENERAL RADIOLOGY | Facility: CLINIC | Age: 53
Discharge: HOME | End: 2023-06-23
Attending: OTOLARYNGOLOGY | Admitting: OTOLARYNGOLOGY
Payer: COMMERCIAL

## 2023-06-23 ENCOUNTER — TELEPHONE (OUTPATIENT)
Dept: MEDSURG UNIT | Facility: CLINIC | Age: 53
End: 2023-06-23
Payer: COMMERCIAL

## 2023-06-23 ENCOUNTER — ANESTHESIA EVENT (OUTPATIENT)
Dept: SURGERY | Facility: CLINIC | Age: 53
End: 2023-06-23
Payer: COMMERCIAL

## 2023-06-23 DIAGNOSIS — R13.10 DYSPHAGIA, UNSPECIFIED TYPE: ICD-10-CM

## 2023-06-23 PROCEDURE — 74221 X-RAY XM ESOPHAGUS 2CNTRST: CPT

## 2023-06-23 PROCEDURE — 250N000013 HC RX MED GY IP 250 OP 250 PS 637: Performed by: OTOLARYNGOLOGY

## 2023-06-23 RX ADMIN — ANTACID/ANTIFLATULENT 4 G: 380; 550; 10; 10 GRANULE, EFFERVESCENT ORAL at 08:42

## 2023-06-24 ENCOUNTER — HEALTH MAINTENANCE LETTER (OUTPATIENT)
Age: 53
End: 2023-06-24

## 2023-06-26 ENCOUNTER — HOSPITAL ENCOUNTER (OUTPATIENT)
Dept: MRI IMAGING | Facility: CLINIC | Age: 53
Discharge: HOME | End: 2023-06-26
Attending: SURGERY
Payer: COMMERCIAL

## 2023-06-26 ENCOUNTER — HOSPITAL ENCOUNTER (OUTPATIENT)
Facility: CLINIC | Age: 53
Discharge: HOME | End: 2023-06-26
Admitting: RADIOLOGY
Payer: COMMERCIAL

## 2023-06-26 ENCOUNTER — ANESTHESIA (OUTPATIENT)
Dept: SURGERY | Facility: CLINIC | Age: 53
End: 2023-06-26
Payer: COMMERCIAL

## 2023-06-26 VITALS
OXYGEN SATURATION: 98 % | HEART RATE: 78 BPM | RESPIRATION RATE: 16 BRPM | SYSTOLIC BLOOD PRESSURE: 105 MMHG | TEMPERATURE: 96.8 F | DIASTOLIC BLOOD PRESSURE: 65 MMHG

## 2023-06-26 DIAGNOSIS — M25.512 CHRONIC LEFT SHOULDER PAIN: ICD-10-CM

## 2023-06-26 DIAGNOSIS — G89.29 CHRONIC LEFT SHOULDER PAIN: ICD-10-CM

## 2023-06-26 PROCEDURE — 70543 MRI ORBT/FAC/NCK W/O &W/DYE: CPT

## 2023-06-26 PROCEDURE — 710N000009 HC RECOVERY PHASE 1, LEVEL 1, PER MIN

## 2023-06-26 PROCEDURE — 255N000002 HC RX 255 OP 636: Performed by: SURGERY

## 2023-06-26 PROCEDURE — A9585 GADOBUTROL INJECTION: HCPCS | Performed by: SURGERY

## 2023-06-26 PROCEDURE — 250N000011 HC RX IP 250 OP 636: Mod: JZ | Performed by: ANESTHESIOLOGY

## 2023-06-26 PROCEDURE — 999N000154 HC STATISTIC RADIOLOGY XRAY, US, CT, MAR, NM

## 2023-06-26 PROCEDURE — 250N000009 HC RX 250: Performed by: NURSE ANESTHETIST, CERTIFIED REGISTERED

## 2023-06-26 PROCEDURE — 250N000025 HC SEVOFLURANE, PER MIN

## 2023-06-26 PROCEDURE — 250N000011 HC RX IP 250 OP 636: Mod: JZ | Performed by: NURSE ANESTHETIST, CERTIFIED REGISTERED

## 2023-06-26 PROCEDURE — 370N000017 HC ANESTHESIA TECHNICAL FEE, PER MIN

## 2023-06-26 PROCEDURE — 258N000003 HC RX IP 258 OP 636: Performed by: NURSE ANESTHETIST, CERTIFIED REGISTERED

## 2023-06-26 RX ORDER — HYDROMORPHONE HYDROCHLORIDE 1 MG/ML
0.4 INJECTION, SOLUTION INTRAMUSCULAR; INTRAVENOUS; SUBCUTANEOUS EVERY 5 MIN PRN
Status: CANCELLED | OUTPATIENT
Start: 2023-06-26

## 2023-06-26 RX ORDER — SODIUM CHLORIDE, SODIUM LACTATE, POTASSIUM CHLORIDE, CALCIUM CHLORIDE 600; 310; 30; 20 MG/100ML; MG/100ML; MG/100ML; MG/100ML
INJECTION, SOLUTION INTRAVENOUS CONTINUOUS
Status: DISCONTINUED | OUTPATIENT
Start: 2023-06-26 | End: 2023-06-26 | Stop reason: HOSPADM

## 2023-06-26 RX ORDER — ACETAMINOPHEN 325 MG/1
975 TABLET ORAL ONCE
Status: DISCONTINUED | OUTPATIENT
Start: 2023-06-26 | End: 2023-06-26 | Stop reason: HOSPADM

## 2023-06-26 RX ORDER — PROPOFOL 10 MG/ML
INJECTION, EMULSION INTRAVENOUS CONTINUOUS PRN
Status: DISCONTINUED | OUTPATIENT
Start: 2023-06-26 | End: 2023-06-26

## 2023-06-26 RX ORDER — ONDANSETRON 2 MG/ML
INJECTION INTRAMUSCULAR; INTRAVENOUS PRN
Status: DISCONTINUED | OUTPATIENT
Start: 2023-06-26 | End: 2023-06-26

## 2023-06-26 RX ORDER — LABETALOL HYDROCHLORIDE 5 MG/ML
10 INJECTION, SOLUTION INTRAVENOUS
Status: DISCONTINUED | OUTPATIENT
Start: 2023-06-26 | End: 2023-06-26 | Stop reason: HOSPADM

## 2023-06-26 RX ORDER — HYDROMORPHONE HCL IN WATER/PF 6 MG/30 ML
0.2 PATIENT CONTROLLED ANALGESIA SYRINGE INTRAVENOUS EVERY 5 MIN PRN
Status: DISCONTINUED | OUTPATIENT
Start: 2023-06-26 | End: 2023-06-26 | Stop reason: HOSPADM

## 2023-06-26 RX ORDER — ACETAMINOPHEN 325 MG/1
975 TABLET ORAL ONCE
Status: CANCELLED | OUTPATIENT
Start: 2023-06-26 | End: 2023-06-26

## 2023-06-26 RX ORDER — FENTANYL CITRATE 0.05 MG/ML
25 INJECTION, SOLUTION INTRAMUSCULAR; INTRAVENOUS EVERY 5 MIN PRN
Status: DISCONTINUED | OUTPATIENT
Start: 2023-06-26 | End: 2023-06-26 | Stop reason: HOSPADM

## 2023-06-26 RX ORDER — FENTANYL CITRATE 50 UG/ML
50 INJECTION, SOLUTION INTRAMUSCULAR; INTRAVENOUS EVERY 5 MIN PRN
Status: CANCELLED | OUTPATIENT
Start: 2023-06-26

## 2023-06-26 RX ORDER — ONDANSETRON 2 MG/ML
4 INJECTION INTRAMUSCULAR; INTRAVENOUS EVERY 30 MIN PRN
Status: CANCELLED | OUTPATIENT
Start: 2023-06-26

## 2023-06-26 RX ORDER — SODIUM CHLORIDE, SODIUM LACTATE, POTASSIUM CHLORIDE, CALCIUM CHLORIDE 600; 310; 30; 20 MG/100ML; MG/100ML; MG/100ML; MG/100ML
INJECTION, SOLUTION INTRAVENOUS CONTINUOUS PRN
Status: DISCONTINUED | OUTPATIENT
Start: 2023-06-26 | End: 2023-06-26

## 2023-06-26 RX ORDER — HYDROXYZINE HYDROCHLORIDE 25 MG/1
25 TABLET, FILM COATED ORAL EVERY 6 HOURS PRN
Status: CANCELLED | OUTPATIENT
Start: 2023-06-26

## 2023-06-26 RX ORDER — ONDANSETRON 4 MG/1
4 TABLET, ORALLY DISINTEGRATING ORAL EVERY 30 MIN PRN
Status: DISCONTINUED | OUTPATIENT
Start: 2023-06-26 | End: 2023-06-26 | Stop reason: HOSPADM

## 2023-06-26 RX ORDER — HYDRALAZINE HYDROCHLORIDE 20 MG/ML
2.5-5 INJECTION INTRAMUSCULAR; INTRAVENOUS EVERY 10 MIN PRN
Status: CANCELLED | OUTPATIENT
Start: 2023-06-26

## 2023-06-26 RX ORDER — PROPOFOL 10 MG/ML
INJECTION, EMULSION INTRAVENOUS PRN
Status: DISCONTINUED | OUTPATIENT
Start: 2023-06-26 | End: 2023-06-26

## 2023-06-26 RX ORDER — LIDOCAINE HYDROCHLORIDE 20 MG/ML
INJECTION, SOLUTION INFILTRATION; PERINEURAL PRN
Status: DISCONTINUED | OUTPATIENT
Start: 2023-06-26 | End: 2023-06-26

## 2023-06-26 RX ORDER — HYDROMORPHONE HYDROCHLORIDE 1 MG/ML
0.2 INJECTION, SOLUTION INTRAMUSCULAR; INTRAVENOUS; SUBCUTANEOUS EVERY 5 MIN PRN
Status: CANCELLED | OUTPATIENT
Start: 2023-06-26

## 2023-06-26 RX ORDER — DIMENHYDRINATE 50 MG/ML
25 INJECTION, SOLUTION INTRAMUSCULAR; INTRAVENOUS
Status: CANCELLED | OUTPATIENT
Start: 2023-06-26

## 2023-06-26 RX ORDER — DEXMEDETOMIDINE HYDROCHLORIDE 4 UG/ML
INJECTION, SOLUTION INTRAVENOUS PRN
Status: DISCONTINUED | OUTPATIENT
Start: 2023-06-26 | End: 2023-06-26

## 2023-06-26 RX ORDER — ONDANSETRON 2 MG/ML
4 INJECTION INTRAMUSCULAR; INTRAVENOUS EVERY 30 MIN PRN
Status: DISCONTINUED | OUTPATIENT
Start: 2023-06-26 | End: 2023-06-26 | Stop reason: HOSPADM

## 2023-06-26 RX ORDER — FENTANYL CITRATE 50 UG/ML
25 INJECTION, SOLUTION INTRAMUSCULAR; INTRAVENOUS EVERY 5 MIN PRN
Status: CANCELLED | OUTPATIENT
Start: 2023-06-26

## 2023-06-26 RX ORDER — FENTANYL CITRATE 0.05 MG/ML
50 INJECTION, SOLUTION INTRAMUSCULAR; INTRAVENOUS EVERY 5 MIN PRN
Status: DISCONTINUED | OUTPATIENT
Start: 2023-06-26 | End: 2023-06-26 | Stop reason: HOSPADM

## 2023-06-26 RX ORDER — LIDOCAINE 40 MG/G
CREAM TOPICAL
Status: DISCONTINUED | OUTPATIENT
Start: 2023-06-26 | End: 2023-06-26 | Stop reason: HOSPADM

## 2023-06-26 RX ORDER — HYDROMORPHONE HCL IN WATER/PF 6 MG/30 ML
0.4 PATIENT CONTROLLED ANALGESIA SYRINGE INTRAVENOUS EVERY 5 MIN PRN
Status: DISCONTINUED | OUTPATIENT
Start: 2023-06-26 | End: 2023-06-26 | Stop reason: HOSPADM

## 2023-06-26 RX ORDER — FENTANYL CITRATE 50 UG/ML
INJECTION, SOLUTION INTRAMUSCULAR; INTRAVENOUS PRN
Status: DISCONTINUED | OUTPATIENT
Start: 2023-06-26 | End: 2023-06-26

## 2023-06-26 RX ORDER — HYDRALAZINE HYDROCHLORIDE 20 MG/ML
2.5-5 INJECTION INTRAMUSCULAR; INTRAVENOUS EVERY 10 MIN PRN
Status: DISCONTINUED | OUTPATIENT
Start: 2023-06-26 | End: 2023-06-26 | Stop reason: HOSPADM

## 2023-06-26 RX ORDER — ONDANSETRON 4 MG/1
4 TABLET, ORALLY DISINTEGRATING ORAL EVERY 30 MIN PRN
Status: CANCELLED | OUTPATIENT
Start: 2023-06-26

## 2023-06-26 RX ORDER — HYDROXYZINE HYDROCHLORIDE 25 MG/1
25 TABLET, FILM COATED ORAL EVERY 6 HOURS PRN
Status: DISCONTINUED | OUTPATIENT
Start: 2023-06-26 | End: 2023-06-26 | Stop reason: HOSPADM

## 2023-06-26 RX ORDER — SODIUM CHLORIDE, SODIUM LACTATE, POTASSIUM CHLORIDE, CALCIUM CHLORIDE 600; 310; 30; 20 MG/100ML; MG/100ML; MG/100ML; MG/100ML
INJECTION, SOLUTION INTRAVENOUS CONTINUOUS
Status: CANCELLED | OUTPATIENT
Start: 2023-06-26

## 2023-06-26 RX ORDER — GADOBUTROL 604.72 MG/ML
15 INJECTION INTRAVENOUS ONCE
Status: COMPLETED | OUTPATIENT
Start: 2023-06-26 | End: 2023-06-26

## 2023-06-26 RX ORDER — DIMENHYDRINATE 50 MG/ML
25 INJECTION, SOLUTION INTRAMUSCULAR; INTRAVENOUS
Status: DISCONTINUED | OUTPATIENT
Start: 2023-06-26 | End: 2023-06-26 | Stop reason: HOSPADM

## 2023-06-26 RX ORDER — LABETALOL HYDROCHLORIDE 5 MG/ML
10 INJECTION, SOLUTION INTRAVENOUS
Status: CANCELLED | OUTPATIENT
Start: 2023-06-26

## 2023-06-26 RX ADMIN — PHENYLEPHRINE HYDROCHLORIDE 200 MCG: 10 INJECTION INTRAVENOUS at 12:25

## 2023-06-26 RX ADMIN — PHENYLEPHRINE HYDROCHLORIDE 150 MCG: 10 INJECTION INTRAVENOUS at 12:39

## 2023-06-26 RX ADMIN — FENTANYL CITRATE 50 MCG: 50 INJECTION, SOLUTION INTRAMUSCULAR; INTRAVENOUS at 12:14

## 2023-06-26 RX ADMIN — SODIUM CHLORIDE, POTASSIUM CHLORIDE, SODIUM LACTATE AND CALCIUM CHLORIDE: 600; 310; 30; 20 INJECTION, SOLUTION INTRAVENOUS at 12:08

## 2023-06-26 RX ADMIN — PHENYLEPHRINE HYDROCHLORIDE 150 MCG: 10 INJECTION INTRAVENOUS at 12:51

## 2023-06-26 RX ADMIN — PHENYLEPHRINE HYDROCHLORIDE 100 MCG: 10 INJECTION INTRAVENOUS at 13:00

## 2023-06-26 RX ADMIN — PHENYLEPHRINE HYDROCHLORIDE 200 MCG: 10 INJECTION INTRAVENOUS at 12:29

## 2023-06-26 RX ADMIN — PROPOFOL 200 MG: 10 INJECTION, EMULSION INTRAVENOUS at 12:14

## 2023-06-26 RX ADMIN — ONDANSETRON 4 MG: 2 INJECTION INTRAMUSCULAR; INTRAVENOUS at 13:33

## 2023-06-26 RX ADMIN — DEXMEDETOMIDINE HYDROCHLORIDE 12 MCG: 200 INJECTION INTRAVENOUS at 12:08

## 2023-06-26 RX ADMIN — PHENYLEPHRINE HYDROCHLORIDE 150 MCG: 10 INJECTION INTRAVENOUS at 13:06

## 2023-06-26 RX ADMIN — GADOBUTROL 10 ML: 604.72 INJECTION INTRAVENOUS at 13:15

## 2023-06-26 RX ADMIN — ONDANSETRON 4 MG: 2 INJECTION INTRAMUSCULAR; INTRAVENOUS at 12:50

## 2023-06-26 RX ADMIN — PHENYLEPHRINE HYDROCHLORIDE 100 MCG: 10 INJECTION INTRAVENOUS at 12:54

## 2023-06-26 RX ADMIN — PHENYLEPHRINE HYDROCHLORIDE 100 MCG: 10 INJECTION INTRAVENOUS at 12:36

## 2023-06-26 RX ADMIN — PHENYLEPHRINE HYDROCHLORIDE 200 MCG: 10 INJECTION INTRAVENOUS at 12:33

## 2023-06-26 RX ADMIN — PHENYLEPHRINE HYDROCHLORIDE 200 MCG: 10 INJECTION INTRAVENOUS at 12:45

## 2023-06-26 RX ADMIN — PROPOFOL 25 MCG/KG/MIN: 10 INJECTION, EMULSION INTRAVENOUS at 12:20

## 2023-06-26 RX ADMIN — DEXMEDETOMIDINE HYDROCHLORIDE 8 MCG: 200 INJECTION INTRAVENOUS at 12:20

## 2023-06-26 RX ADMIN — PHENYLEPHRINE HYDROCHLORIDE 100 MCG: 10 INJECTION INTRAVENOUS at 12:57

## 2023-06-26 RX ADMIN — FENTANYL CITRATE 50 MCG: 50 INJECTION, SOLUTION INTRAMUSCULAR; INTRAVENOUS at 13:15

## 2023-06-26 RX ADMIN — MIDAZOLAM 2 MG: 1 INJECTION INTRAMUSCULAR; INTRAVENOUS at 12:08

## 2023-06-26 RX ADMIN — LIDOCAINE HYDROCHLORIDE 100 MG: 20 INJECTION, SOLUTION INFILTRATION; PERINEURAL at 12:14

## 2023-06-26 ASSESSMENT — ACTIVITIES OF DAILY LIVING (ADL)
ADLS_ACUITY_SCORE: 35

## 2023-06-26 ASSESSMENT — COPD QUESTIONNAIRES: COPD: 1

## 2023-06-26 ASSESSMENT — LIFESTYLE VARIABLES: TOBACCO_USE: 1

## 2023-06-26 NOTE — DISCHARGE INSTRUCTIONS
Discharge Instructions for Sedation and General Anesthesia    * It's not unusual to feel dizzy, light-headed or faint for up to 24 hours after anesthesia. If you have these symptoms: sit for a few minutes before standing and have someone assist you when you get up to walk or use the bathroom.    * You should rest and relax for the next 24 hours. You must make arrangements to have an adult stay with you for at least 24 hours after your discharge.  Avoid hazardous and strenuous activity.    * DO NOT DRIVE any vehicle or operate mechanical equipment for 24 hours following the end of your procedure.  Even though you may feel normal, your reactions may be affected by the medication you have received.    * Do not drink alcoholic beverages for 24 hours following anesthesia.    *Slowly progress to your regular diet as you feel able.      *It's not unusual to feel nauseated and/or vomit after receiving anesthesia.  If you develop these symptoms, drink clear liquids (apple juice, ginger ale, broth, 7-up, etc) until you feel better.  If your nausea and vomiting persists for 24 hours, please notify your provider.    * For any questions of a medical nature, call your ordering provider.    * Do not make important decisions for 24 hours.    * If you had general anesthesia, you may have a sore throat for a couple of days related to the breathing tube used during surgery.  You may use Cepacol lozenges to help with this discomfort.  If it worsens or if you develop a fever, contact your ordering provider.

## 2023-06-26 NOTE — PROGRESS NOTES
Care Suites Post Procedure Note    Patient Information  Name: Swetha Patterson  Age: 53 year old    Post Procedure  Time patient returned to Care Suites: 1400  Concerns/abnormal assessment: None at this time.  If abnormal assessment, provider notified: Yes  Plan/Other: Per orders.    Kimberly Hinds RN

## 2023-06-26 NOTE — PROGRESS NOTES
Care Suites Discharge Nursing Note    Patient Information  Name: Swetha Patterson  Age: 53 year old    Discharge Education:  Discharge instructions reviewed: Yes  Additional education/resources provided: NA  Patient/patient representative verbalizes understanding: Yes  Patient discharging on new medications: No  Medication education completed: N/A    Discharge Plans:   Discharge location: home  Discharge ride contacted: Yes  Approximate discharge time: 1445    Discharge Criteria:  Discharge criteria met and vital signs stable: Yes.  Tolerated PO.  Denies nausea, dizziness or lightheadedness.  Steady on her feet.    Patient Belongs:  Patient belongings returned to patient: Yes    Kimberly Hinds RN

## 2023-06-26 NOTE — ANESTHESIA PROCEDURE NOTES
Airway       Patient location: MRI suite.       Procedure Start/Stop Times: 6/26/2023 12:14 PM  Staff -        Anesthesiologist:  Kevin Parson MD       CRNA: Lucy Parmar APRN CRNA       Performed By: CRNAIndications and Patient Condition       Indications for airway management: elizabeth-procedural and airway protection       Induction type:intravenous       Mask difficulty assessment: 0 - not attempted    Final Airway Details       Final airway type: supraglottic airway    Supraglottic Airway Details        Type: LMA       Brand: I-Gel       LMA size: 5    Post intubation assessment        Number of attempts at approach: 1       Number of other approaches attempted: 0       Secured with: pink tape       Ease of procedure: easy       Dentition: Unchanged    Medication(s) Administered   Medication Administration Time: 6/26/2023 12:14 PM

## 2023-06-26 NOTE — ANESTHESIA POSTPROCEDURE EVALUATION
Patient: Swetha Patterson    Procedure: Procedure(s):  Anesthesia out of OR MRI       Anesthesia Type:  General    Note:     Postop Pain Control: Uneventful            Sign Out: Well controlled pain   PONV: No   Neuro/Psych: Uneventful            Sign Out: Acceptable/Baseline neuro status   Airway/Respiratory: Uneventful            Sign Out: Acceptable/Baseline resp. status   CV/Hemodynamics: Uneventful            Sign Out: Acceptable CV status   Other NRE: NONE   DID A NON-ROUTINE EVENT OCCUR?            Last vitals:  Vitals Value Taken Time   BP 97/77 06/26/23 1347   Temp 36  C (96.8  F) 06/26/23 1336   Pulse 73 06/26/23 1347   Resp 13 06/26/23 1347   SpO2 93 % 06/26/23 1348   Vitals shown include unvalidated device data.    Electronically Signed By: Kevin Parson MD  June 26, 2023  2:49 PM

## 2023-06-26 NOTE — ANESTHESIA PREPROCEDURE EVALUATION
Anesthesia Pre-Procedure Evaluation    Patient: Swetha Patterson   MRN: [id number] : 1970        Procedure : Procedure(s):  Anesthesia out of OR MRI          Past Medical History:   Diagnosis Date     Benign essential hypertension      Bipolar disorder (H)      Chronic abdominal pain      Chronic constipation      COPD (chronic obstructive pulmonary disease) (H)      Hypothyroidism      Impaired fasting glucose      Obesity (BMI 30-39.9)      PONV (postoperative nausea and vomiting)      Pure hypercholesterolemia       Past Surgical History:   Procedure Laterality Date     ANESTHESIA OUT OF OR MRI N/A 10/07/2021    Procedure: ANESTHESIA OUT OF OR MRI;  Surgeon: GENERIC ANESTHESIA PROVIDER;  Location: [facility] OR     ANESTHESIA OUT OF OR MRI N/A 2023    Procedure: MRI OF NECK WITH AND WITHOUT CONTRAST;  Surgeon: GENERIC ANESTHESIA PROVIDER;  Location: [facility] OR     APPENDECTOMY       ARTHROSCOPY SHOULDER DECOMPRESSION Left 10/21/2015    Procedure: ARTHROSCOPY SHOULDER DECOMPRESSION;  Surgeon: Julien Milian MD;  Location: [facility] OR     BIOPSY ARTERY TEMPORAL Left 2020    Procedure: LEFT TEMPORAL ARTERY BIOPSY;  Surgeon: Ibeth Conner MD;  Location: [facility] OR     BRONCHIAL THERMOPLASTY N/A 2014    Procedure: BRONCHIAL THERMOPLASTY;  Surgeon: Ward Whitaker MD;  [facility]: [facility]     BRONCHIAL THERMOPLASTY N/A 2014    Procedure: BRONCHIAL THERMOPLASTY;  Surgeon: Ward Whitaker MD;  [facility]: [facility]     BRONCHIAL THERMOPLASTY N/A 2015    Procedure: BRONCHIAL THERMOPLASTY;  Surgeon: Ward Whitaker MD;  [facility]: [facility]     COLONOSCOPY N/A 2018    Procedure: COLONOSCOPY ([facility]);  Surgeon: Marshall Oakes MD;  Location: [facility] OR     COLONOSCOPY N/A 10/22/2020    Procedure: Colonoscopy;  Surgeon: Marshall Mccormick MD;  Location: [facility] OR     COLONOSCOPY N/A 2023    Procedure: COLONOSCOPY, FLEXIBLE, WITH LESION REMOVAL USING SNARE;  Surgeon: Carson Domínguez MD;   Location:  GI     DISCECTOMY, FUSION CERVICAL ANTERIOR ONE LEVEL, COMBINED N/A 05/01/2018    Procedure: COMBINED DISCECTOMY, FUSION CERVICAL ANTERIOR ONE LEVEL;  1.  C5-C6 anterior cervical diskectomy and fusion.    2.  C5-C6 application of intervertebral biomechanical device for interbody fusion purposes.    3.  C5-C6 anterior instrumentation using the standard Kristin InViZia 24 mm plate with four associated bone screws, 12 mm in length.  Inferior screws are fixed.  Superior screws are va     ESOPHAGOSCOPY, GASTROSCOPY, DUODENOSCOPY (EGD), COMBINED N/A 10/22/2020    Procedure: Esophagoscopy, gastroscopy, duodenoscopy with biopsies;  Surgeon: Marshall Mccormick MD;  Location:  OR     ESOPHAGOSCOPY, GASTROSCOPY, DUODENOSCOPY (EGD), COMBINED N/A 06/17/2021    Procedure: ESOPHAGOGASTRODUODENOSCOPY, WITH BIOPSY;  Surgeon: Darrel Damon MD;  Location:  GI     EXCISE MASS NECK Left 02/01/2023    Procedure: exploration of  NECK left;  Surgeon: Ibeth Conner MD;  Location:  OR     EXCISE MASS TRUNK Left 11/03/2021    Procedure: EXCISION LEFT SHOULDER LIPOMA;  Surgeon: Ibeth Conner MD;  Location:  OR     EXCISE NODE MEDIASTINAL  04/26/2013    Procedure: EXCISE NODE MEDIASTINAL;;  Surgeon: Av Peña MD;  Location:  OR     LAPAROSCOPIC CHOLECYSTECTOMY N/A 10/19/2017    Procedure: LAPAROSCOPIC CHOLECYSTECTOMY;  LAPAROSCOPIC CHOLECYSTECTOMY;  Surgeon: Ney Jerry MD;  Location:  OR     LARYNGOSCOPY, EXCISE VOCAL CORD LESION, COMBINED       THORACOSCOPY  04/26/2013    Procedure: THORACOSCOPY;  LEFT VIDEO ASSISTED THORACOSCOPY, RESECTION OF POSTERIOR MEDIASTINAL MASS;  Surgeon: Av Peña MD;  Location:  OR     TONSILLECTOMY & ADENOIDECTOMY        Allergies   Allergen Reactions     Codeine Nausea and Vomiting     Cyclobenzaprine Nausea     Hydrocodone Nausea and Vomiting      Social History     Tobacco Use     Smoking status: Former     Types: Cigarettes      Passive exposure: Past     Smokeless tobacco: Never   Substance Use Topics     Alcohol use: Not Currently      Wt Readings from Last 1 Encounters:   06/22/23 93.4 kg (206 lb)        Anesthesia Evaluation   Pt has had prior anesthetic.     History of anesthetic complications  - PONV.      ROS/MED HX  ENT/Pulmonary:     (+) AJ risk factors, snores loudly, hypertension, obese, daytime somnolence, vocal cord abnormalities (Lesions s/p excision) -  tobacco use, Past use, COPD,     Neurologic:       Cardiovascular:     (+) Dyslipidemia hypertension-----Previous cardiac testing   Echo: Date: 7/30/21 Results:  Interpretation Summary  The visual ejection fraction is 55-60%. Regional wall motion abnormalities cannot be excluded due to limited visualization.      Left Ventricle  The left ventricle is normal in size. There is normal left ventricular wall thickness. Diastolic Doppler findings (E/E' ratio and/or other parameters) suggest left ventricular filling pressures are indeterminate. The visual ejection fraction is 55-60%. Regional wall motion abnormalities cannot be excluded due to limited visualization. The left ventricular apex is not well visualized. All of the other left ventricular segments contract normally. The apical endocardium was only seen in one apical view where it contracted normally but was absent in the other apical views.    Right Ventricle  The right ventricle is normal in size and function.    Atria  Normal left atrial size. Right atrial size is normal. There is no color Doppler evidence of an atrial shunt.    Mitral Valve  There is trace mitral regurgitation.    Tricuspid Valve  There is trace tricuspid regurgitation. The right ventricular systolic pressure is approximated at 8.7 mmHg plus the right atrial pressure.    Aortic Valve  The aortic valve is trileaflet. No aortic regurgitation is present. No hemodynamically significant valvular aortic stenosis.    Pulmonic Valve  There is trace pulmonic  valvular regurgitation. Normal pulmonic valve velocity.    Vessels  The aortic root is normal size. Normal size ascending aorta. Inferior vena cava not well visualized for estimation of right atrial pressure.    Pericardium  There is no pericardial effusion.    Rhythm  Sinus rhythm was noted. The patient exhibited frequent PVCs.  Stress Test: Date: Results:    ECG Reviewed: Date: 4/12/23 Results:  SR w/ PVCs and prolonged QT  Cath: Date: Results:      METS/Exercise Tolerance:     Hematologic:     (+) anemia,     Musculoskeletal: Comment: S/p ACDF      GI/Hepatic:       Renal/Genitourinary:       Endo:     (+) thyroid problem, hypothyroidism, Obesity (Class 2),     Psychiatric/Substance Use:     (+) psychiatric history bipolar     Infectious Disease:       Malignancy:       Other: Comment: Taking naltrexone     (+) , H/O Chronic Pain (Fibromyalgia),        Physical Exam    Airway        Mallampati: III   TM distance: > 3 FB   Neck ROM: full   Mouth opening: > 3 cm    Respiratory Devices and Support         Dental       (+) Modest Abnormalities - crowns, retainers, 1 or 2 missing teeth and Removable bridges or other hardware      Cardiovascular          Rhythm and rate: regular and normal     Pulmonary           breath sounds clear to auscultation           OUTSIDE LABS:  CBC:   Lab Results   Component Value Date    WBC 11.8 (H) 04/12/2023    WBC 9.0 12/23/2022    HGB 11.6 (L) 04/12/2023    HGB 11.7 12/23/2022    HCT 36.0 04/12/2023    HCT 36.3 12/23/2022     04/12/2023     12/23/2022     BMP:   Lab Results   Component Value Date     04/12/2023     12/23/2022    POTASSIUM 3.9 04/12/2023    POTASSIUM 4.8 02/01/2023    CHLORIDE 103 04/12/2023    CHLORIDE 101 12/23/2022    CO2 24 04/12/2023    CO2 24 12/23/2022    BUN 15.3 04/12/2023    BUN 17.4 12/23/2022    CR 0.81 04/12/2023    CR 1.08 (H) 02/01/2023     (H) 04/12/2023     (H) 02/01/2023     COAGS:   Lab Results   Component  Value Date    INR 0.99 11/20/2020     POC:   Lab Results   Component Value Date    BGM 90 11/21/2020    HCG Negative 12/23/2022    HCGS Negative 11/12/2021     HEPATIC:   Lab Results   Component Value Date    ALBUMIN 4.3 04/12/2023    PROTTOTAL 7.3 04/12/2023    ALT 31 04/12/2023    AST 34 04/12/2023    ALKPHOS 169 (H) 04/12/2023    BILITOTAL <0.2 04/12/2023     OTHER:   Lab Results   Component Value Date    PH 7.38 11/12/2021    LACT 1.8 11/12/2021    A1C 5.5 08/17/2022    GENE 9.0 04/12/2023    MAG 1.9 03/19/2013    LIPASE 101 05/22/2021    TSH 6.29 (H) 08/17/2022    T4 0.96 08/17/2022    CRP 16.0 (H) 02/20/2020    SED 16 02/20/2020       Anesthesia Plan    ASA Status:  3   NPO Status:  NPO Appropriate    Anesthesia Type: General.     - Airway: LMA   Induction: Intravenous.   Maintenance: TIVA.        Consents    Anesthesia Plan(s) and associated risks, benefits, and realistic alternatives discussed. Questions answered and patient/representative(s) expressed understanding.    - Discussed:     - Discussed with:  Patient         Postoperative Care            Comments:                Kevin Parson MD

## 2023-06-26 NOTE — PROGRESS NOTES
Care Suites Admission Nursing Note    Patient Information  Name: Swetha Patterson  Age: 53 year old  Reason for admission: MRI with sedation  Care Suites arrival time: 1030    Visitor Information  Name: Myrna     Patient Admission/Assessment   Pre-procedure assessment complete: Yes  If abnormal assessment/labs, provider notified: N/A  NPO: Yes  Medications held per instructions/orders: N/A  Consent: obtained  If applicable, pregnancy test status: deferred  Patient oriented to room: Yes  Education/questions answered: Yes  Plan/other: Proceed as scheduled.    Discharge Planning  Discharge name/phone number: Myrna- [phone]  Overnight post sedation caregiver: Myrna  Discharge location: home    Kimberly Hinds RN

## 2023-06-26 NOTE — ANESTHESIA CARE TRANSFER NOTE
Patient: Swetha Patterson    Procedure: Procedure(s):  Anesthesia out of OR MRI       Diagnosis: Chronic left shoulder pain [M25.512, G89.29]  Diagnosis Additional Information: No value filed.    Anesthesia Type:   General     Note:    Oropharynx: oropharynx clear of all foreign objects and spontaneously breathing  Level of Consciousness: awake  Oxygen Supplementation: face mask  Level of Supplemental Oxygen (L/min / FiO2): 6  Independent Airway: airway patency satisfactory and stable  Dentition: dentition unchanged  Vital Signs Stable: post-procedure vital signs reviewed and stable  Report to RN Given: handoff report given  Patient transferred to: PACU    Handoff Report: Identifed the Patient, Identified the Reponsible Provider, Reviewed the pertinent medical history, Discussed the surgical course, Reviewed Intra-OP anesthesia mangement and issues during anesthesia, Set expectations for post-procedure period and Allowed opportunity for questions and acknowledgement of understanding      Vitals:  Vitals Value Taken Time   BP 97/77 06/26/23 1347   Temp 36  C (96.8  F) 06/26/23 1336   Pulse 73 06/26/23 1347   Resp 13 06/26/23 1347   SpO2 93 % 06/26/23 1348       In PACU, report given. Denying pain or nausea. VSS. On 6 lpm CFM.    Electronically Signed By: LEIGH Jimenez CRNA  June 26, 2023  1:49 PM

## 2023-06-26 NOTE — INTERVAL H&P NOTE
"I have reviewed the surgical (or preoperative) H&P that is linked to this encounter, and examined the patient. There are no significant changes    Clinical Conditions Present on Arrival:  Clinically Significant Risk Factors Present on Admission                  # Obesity: Estimated body mass index is 36.49 kg/m  as calculated from the following:    Height as of 6/22/23: 1.6 m (5' 3\").    Weight as of 6/22/23: 93.4 kg (206 lb).       "

## 2023-06-27 ENCOUNTER — TELEPHONE (OUTPATIENT)
Dept: INTERNAL MEDICINE | Facility: CLINIC | Age: 53
End: 2023-06-27
Payer: COMMERCIAL

## 2023-06-27 NOTE — TELEPHONE ENCOUNTER
Patient is calling because she had a esophagram last week and that turned out good but he thinks she should have a throat culture to see if she has an infection or yeast maybe.

## 2023-06-28 NOTE — TELEPHONE ENCOUNTER
Patient has no fever, she just has the sore throat, throat clearing and intermittent cough.  She will try changing her antihistamine to see if that helps and she will continue to stay indoors during air quality advisory.  Advised she could request an E-visit if she has further problems.  SERJIO Chandra R.N.

## 2023-06-28 NOTE — TELEPHONE ENCOUNTER
The doctor would have noticed if she had is to esophagitis  The only test we did from the throat with strep throat  Please triage for strep throat  Patient also can do E-visit for strep throat if she thinks she has it

## 2023-06-29 ENCOUNTER — TELEPHONE (OUTPATIENT)
Dept: SURGERY | Facility: CLINIC | Age: 53
End: 2023-06-29
Payer: COMMERCIAL

## 2023-06-29 NOTE — TELEPHONE ENCOUNTER
Patient of Dr. Conner who underwent excision of left shoulder lipoma, 11/21.  And left neck exploration 2/1/23.      Swetha is calling for MRI results from 6/26/23.      Informed patient that Dr. Conner is out of the office today but will be in clinic tomorrow.  I sent a staff message to Dr. Conner asking her to call patient with results when she is available tomorrow.      Patient is in agreement with this plan.

## 2023-07-10 ENCOUNTER — TELEPHONE (OUTPATIENT)
Dept: INTERNAL MEDICINE | Facility: CLINIC | Age: 53
End: 2023-07-10
Payer: COMMERCIAL

## 2023-07-10 NOTE — TELEPHONE ENCOUNTER
[facility] * walker seat order received via fax    Forms in Dr. mail box for review and signature.

## 2023-07-11 ENCOUNTER — OFFICE VISIT (OUTPATIENT)
Dept: SURGERY | Facility: CLINIC | Age: 53
End: 2023-07-11
Payer: COMMERCIAL

## 2023-07-11 ENCOUNTER — TELEPHONE (OUTPATIENT)
Dept: INTERNAL MEDICINE | Facility: CLINIC | Age: 53
End: 2023-07-11

## 2023-07-11 VITALS
SYSTOLIC BLOOD PRESSURE: 122 MMHG | DIASTOLIC BLOOD PRESSURE: 68 MMHG | BODY MASS INDEX: 36.5 KG/M2 | WEIGHT: 206 LBS | RESPIRATION RATE: 15 BRPM | HEIGHT: 63 IN | OXYGEN SATURATION: 96 % | HEART RATE: 80 BPM

## 2023-07-11 DIAGNOSIS — E66.812 CLASS 2 SEVERE OBESITY DUE TO EXCESS CALORIES WITH SERIOUS COMORBIDITY AND BODY MASS INDEX (BMI) OF 37.0 TO 37.9 IN ADULT (H): ICD-10-CM

## 2023-07-11 DIAGNOSIS — M25.512 CHRONIC PAIN OF BOTH SHOULDERS: Primary | ICD-10-CM

## 2023-07-11 DIAGNOSIS — M25.511 CHRONIC PAIN OF BOTH SHOULDERS: Primary | ICD-10-CM

## 2023-07-11 DIAGNOSIS — E66.01 CLASS 2 SEVERE OBESITY DUE TO EXCESS CALORIES WITH SERIOUS COMORBIDITY AND BODY MASS INDEX (BMI) OF 37.0 TO 37.9 IN ADULT (H): ICD-10-CM

## 2023-07-11 DIAGNOSIS — G89.29 CHRONIC PAIN OF BOTH SHOULDERS: Primary | ICD-10-CM

## 2023-07-11 PROCEDURE — 99213 OFFICE O/P EST LOW 20 MIN: CPT | Performed by: SURGERY

## 2023-07-11 RX ORDER — DIAZEPAM 10 MG
1 TABLET ORAL
COMMUNITY
Start: 2023-06-27 | End: 2024-08-15

## 2023-07-11 RX ORDER — OXYCODONE AND ACETAMINOPHEN 5; 325 MG/1; MG/1
1 TABLET ORAL EVERY 6 HOURS PRN
COMMUNITY
Start: 2023-07-05 | End: 2023-11-15

## 2023-07-11 RX ORDER — TRAZODONE HYDROCHLORIDE 100 MG/1
100 TABLET ORAL
COMMUNITY
Start: 2023-06-29 | End: 2023-11-15

## 2023-07-11 NOTE — TELEPHONE ENCOUNTER
Fax received from [facility]  06/28/23 for review and signature.  Put in Dr. Low's in basket.

## 2023-07-11 NOTE — TELEPHONE ENCOUNTER
Patient states Dr. Low did not order this. Patient states she has had some falls recently. Advised patient to schedule appointment with provider to evaluate need for walker.

## 2023-07-12 DIAGNOSIS — F41.9 ANXIETY: ICD-10-CM

## 2023-07-12 DIAGNOSIS — K21.9 GASTROESOPHAGEAL REFLUX DISEASE WITHOUT ESOPHAGITIS: ICD-10-CM

## 2023-07-12 RX ORDER — HYDROXYZINE HYDROCHLORIDE 50 MG/1
TABLET, FILM COATED ORAL
Qty: 70 TABLET | Refills: 9 | Status: SHIPPED | OUTPATIENT
Start: 2023-07-12 | End: 2024-03-18

## 2023-07-12 NOTE — TELEPHONE ENCOUNTER
Pending Prescriptions:                       Disp   Refills    hydrOXYzine (ATARAX) 50 MG tablet [Pharma*70 tab*9            Sig: TAKE TWO TABLETS BY MOUTH THREE TIMES A DAY AS           NEEDED FOR ANXIETY    Prescription approved per [facility] Refill Protocol.

## 2023-07-12 NOTE — TELEPHONE ENCOUNTER
Pending Prescriptions:                       Disp   Refills    omeprazole (PRILOSEC) 20 MG DR capsule [P*180 ca*1            Sig: TAKE ONE CAPSULE BY MOUTH TWICE A DAY    Prescription approved per [facility] Refill Protocol.

## 2023-07-14 ENCOUNTER — VIRTUAL VISIT (OUTPATIENT)
Dept: UROLOGY | Facility: CLINIC | Age: 53
End: 2023-07-14
Payer: COMMERCIAL

## 2023-07-14 ENCOUNTER — TELEPHONE (OUTPATIENT)
Dept: UROLOGY | Facility: CLINIC | Age: 53
End: 2023-07-14

## 2023-07-14 ENCOUNTER — TELEPHONE (OUTPATIENT)
Dept: INTERNAL MEDICINE | Facility: CLINIC | Age: 53
End: 2023-07-14
Payer: COMMERCIAL

## 2023-07-14 ENCOUNTER — PRE VISIT (OUTPATIENT)
Dept: UROLOGY | Facility: CLINIC | Age: 53
End: 2023-07-14

## 2023-07-14 DIAGNOSIS — N20.1 URETERAL STONE: Primary | ICD-10-CM

## 2023-07-14 DIAGNOSIS — N20.0 KIDNEY STONE: ICD-10-CM

## 2023-07-14 DIAGNOSIS — N20.0 KIDNEY STONE: Primary | ICD-10-CM

## 2023-07-14 DIAGNOSIS — Z53.9 DIAGNOSIS NOT YET DEFINED: Primary | ICD-10-CM

## 2023-07-14 PROCEDURE — 99204 OFFICE O/P NEW MOD 45 MIN: CPT | Mod: VID | Performed by: UROLOGY

## 2023-07-14 PROCEDURE — G0179 MD RECERTIFICATION HHA PT: HCPCS | Performed by: INTERNAL MEDICINE

## 2023-07-14 RX ORDER — DIMENHYDRINATE 50 MG
50 TABLET ORAL
Qty: 14 TABLET | Refills: 0 | Status: SHIPPED | OUTPATIENT
Start: 2023-07-14

## 2023-07-14 RX ORDER — IBUPROFEN 400 MG/1
400 TABLET, FILM COATED ORAL EVERY 6 HOURS PRN
Qty: 60 TABLET | Refills: 1 | Status: SHIPPED | OUTPATIENT
Start: 2023-07-14 | End: 2023-11-15

## 2023-07-14 RX ORDER — OXYCODONE HYDROCHLORIDE 5 MG/1
5 TABLET ORAL EVERY 6 HOURS PRN
Qty: 12 TABLET | Refills: 0 | Status: SHIPPED | OUTPATIENT
Start: 2023-07-14 | End: 2023-07-17

## 2023-07-14 RX ORDER — SENNOSIDES 8.6 MG
650 CAPSULE ORAL EVERY 8 HOURS PRN
Qty: 60 TABLET | Refills: 1 | Status: SHIPPED | OUTPATIENT
Start: 2023-07-14 | End: 2023-11-15

## 2023-07-14 NOTE — TELEPHONE ENCOUNTER
Called patient and relayed message from provider.  She verbalized understanding.  She will come to lab to  a strainer.

## 2023-07-14 NOTE — TELEPHONE ENCOUNTER
Urology referral  She should have a strainer  from the lab and if she catches the stone I placed an order to have it analyzed in the lab

## 2023-07-14 NOTE — TELEPHONE ENCOUNTER
Navarro from [facility] pharmacy calling regarding message below. Navarro stated medication oxyCODONE (ROXICODONE) 5 MG tablet, prescribed by Solomon is on back order and wanting to verify would it be okay to prescribe pt 10mg instead. Please call Navarro [phone]

## 2023-07-14 NOTE — TELEPHONE ENCOUNTER
RN[facility] called to pharmacy and approved new rx. RNCC called to pt to notify as well. Resolved    NNEKA Kline  Care Coordinator  [phone]

## 2023-07-14 NOTE — LETTER
7/14/2023       RE: Swetha Patterson  [address]  [address]  [facility] [address]     Dear Colleague,    Thank you for referring your patient, Swetha Patterson, to the [facility] UROLOGY CLINIC [place] at [facility]. Please see a copy of my visit note below.          UROLOGY OUTPATIENT VISIT      Chief Complaint:   Right ureteral stone      Synopsis    Swetha Patterson is a very pleasant AGE: 53 year old year old person diagnosed last week with a highly symptomatic right ureteral stone.  This is her first episode.  She has been in severe pain with poor control with oral medications.  Pain is mainly in the right flank extending down into the pelvis.  She is taking Flomax as well as ibuprofen twice daily.  She denies any signs or symptoms of infection including fevers or chills.  She has been monitoring her urine and has not seen a stone pass has not been using a strainer.    I personally reviewed her CT scan from last week showing a 4 mm ureteral stone in the far distal right ureter         Medications     Current Outpatient Medications   Medication    acetaminophen (TYLENOL) 500 MG tablet    albuterol (PROAIR HFA/PROVENTIL HFA/VENTOLIN HFA) 108 (90 Base) MCG/ACT inhaler    albuterol (PROVENTIL) (2.5 MG/3ML) 0.083% neb solution    ALLERGY RELIEF CETIRIZINE 10 MG tablet    amLODIPine (NORVASC) 2.5 MG tablet    atorvastatin (LIPITOR) 80 MG tablet    benzoyl peroxide (ACNE-CLEAR) 10 % external gel    blood glucose (ACCU-CHEK ALLYSON PLUS) test strip    blood glucose (NO BRAND SPECIFIED) lancets standard    blood glucose (NO BRAND SPECIFIED) lancets standard    blood glucose (NO BRAND SPECIFIED) test strip    blood glucose monitoring (NO BRAND SPECIFIED) meter device kit    budesonide-formoterol (SYMBICORT) 160-4.5 MCG/ACT Inhaler    buPROPion (WELLBUTRIN XL) 150 MG 24 hr tablet    cefdinir (OMNICEF) 300 MG capsule    clindamycin (CLINDAMAX) 1 % external gel     diazepam (VALIUM) 10 MG tablet    doxycycline hyclate (VIBRAMYCIN) 100 MG capsule    Emollient (CERAVE MOISTURIZING) CREA    furosemide (LASIX) 20 MG tablet    gabapentin (NEURONTIN) 100 MG capsule    hydrocortisone 2.5 % cream    hydrOXYzine (ATARAX) 50 MG tablet    ibuprofen (ADVIL/MOTRIN) 200 MG tablet    lamoTRIgine (LAMICTAL) 150 MG tablet    levothyroxine (SYNTHROID/LEVOTHROID) 50 MCG tablet    lisinopril (ZESTRIL) 20 MG tablet    metFORMIN (GLUCOPHAGE) 500 MG tablet    minoxidil (ROGAINE) 5 % external solution    montelukast (SINGULAIR) 10 MG tablet    multivitamin, therapeutic (THERA-VIT) TABS tablet    nitroGLYcerin (NITROSTAT) 0.4 MG sublingual tablet    nystatin (MYCOSTATIN) 784664 UNIT/GM external powder    omeprazole (PRILOSEC) 20 MG DR capsule    ondansetron (ZOFRAN-ODT) 4 MG ODT tab    oxyCODONE-acetaminophen (PERCOCET) 5-325 MG tablet    permethrin (LICE TREATMENT CREME RINSE) 1 % external liquid    polyethylene glycol (MIRALAX) 17 GM/Dose powder    RYBELSUS 14 MG tablet    sucralfate (CARAFATE) 1 GM tablet    sulfamethoxazole-trimethoprim (BACTRIM DS) 800-160 MG tablet    terbinafine (LAMISIL) 1 % external cream    traZODone (DESYREL) 100 MG tablet    triamcinolone (KENALOG) 0.025 % external ointment    Vitamin D3 (CHOLECALCIFEROL) 125 MCG (5000 UT) tablet     No current facility-administered medications for this visit.         The following  distinct labs were reviewed    I personally reviewed all applicable laboratory data and went over findings with patient  Significant for:  July 5, 2023  Urinalysis with large blood, negative nitrate, trace leuk esterase  Urine culture with no growth  Creatinine 0.84  White blood cell count 12.3    *Note: Due to a large number of results and/or encounters for the requested time period, some results have not been displayed. A complete set of results can be found in Results Review.              Assessment/Plan   53 year old year old person with symptomatic right  ureteral stone  -We discussed the nature of ureteral stones and obstruction.  We discussed spontaneous stone passage, medical expulsive therapy as well as stone removal.  The patient has ultimately decided to proceed with trial of passage.  We went over optimal treatment strategies for management of colic including scheduled Tylenol and ibuprofen as well as Flomax and Dramamine.  A small course of oxycodone was prescribed for breakthrough pain.    They are aware to return to the office/ER if develops significant pain, inability to tolerate PO or signs of infection including fevers and chills.      CC:  Roro Chawla    20 minutes spent on clinical encounter including  Review of medical records  Review of outside records  Documentation  Coordinating follow-up medical care            Virtual Visit Details    Type of service:  Video Visit   Video Start Time: 2:35  Video End Time:2:50    Originating Location (pt. Location): Home    Distant Location (provider location):  On-site  Platform used for Video Visit: Michael Carbajal MD

## 2023-07-14 NOTE — TELEPHONE ENCOUNTER
Patient was seen in the ED in [place] last week and they found Kidney Stones the size of a talon.  Patient states they gave her pain pills and that was it and sent her home.  Patient states she is still in a great deal of pain.  The did not give her a strainer to catch anything.  Patient is not sure what to do next.  Please address and advise.

## 2023-07-14 NOTE — TELEPHONE ENCOUNTER
Please review ER visit on Care Everywhere    Urology referral pended.  Please review and sign if appropriate.      Any other recommendations?

## 2023-07-14 NOTE — TELEPHONE ENCOUNTER
MEDICAL RECORDS REQUEST   [facility]  Urology Clinic  [address] [place] [address]  PHONE: [phone]  Fax: [phone]        FUTURE VISIT INFORMATION                                                   Swetha Patterson, : 1970 scheduled for future visit at [facility]    APPOINTMENT INFORMATION:    Date: 2023    Provider:  Jose Francisco Carbajal MD    Reason for Visit/Diagnosis: stone consult CT     REFERRAL INFORMATION:    Referring provider:  Roro Chawla MD in [place] [facility]    RECORDS REQUESTED FOR VISIT                                                     NOTES  STATUS/DETAILS   DISCHARGE REPORT from the ER  yes, 2023 -- [facility] ED   MEDICATION LIST  yes   LABS     URINALYSIS (UA)  yes   images  yes, 2023 -- 2023 -- CT ABD PELVIS STONE     PRE-VISIT CHECKLIST      Joint diagnostic appointment coordinated correctly          (ensure right order & amount of time) Yes   RECORD COLLECTION COMPLETE Yes

## 2023-07-14 NOTE — TELEPHONE ENCOUNTER
[facility] Call Center    Phone Message    May a detailed message be left on voicemail: yes     Reason for Call: Medication Question or concern regarding medication   Prescription Clarification  Name of Medication: oxyCODONE (ROXICODONE) 5 MG tablet     Prescribing Provider: Dr. Carbajal   Pharmacy: [facility] [address] [place] [address]   What on the order needs clarification? Pt called and stated that the pharmacy doesn't have 5mg tablets in stock and they only have 10mg and wondering if Dr. Carbajal is OK if they cut them in 1/2 for her. Pt requesting someone call the pharmacy ASAP so she can get the meds before the weekend. Thanks!    Action Taken: Message routed to:  [facility] ([facility] Uro    Travel Screening: Not Applicable

## 2023-07-14 NOTE — NURSING NOTE
Is the patient currently in the state of [place]? YES    Visit mode:VIDEO    If the visit is dropped, the patient can be reconnected by: VIDEO VISIT: Text to cell phone: [phone]    Will anyone else be joining the visit? NO      How would you like to obtain your AVS? MyChart    Are changes needed to the allergy or medication list? NO    Reason for visit: Consult (stone consult CT 7/5 Not passed, in pain)

## 2023-07-14 NOTE — PROGRESS NOTES
UROLOGY OUTPATIENT VISIT      Chief Complaint:   Right ureteral stone      Synopsis    Swetha Patterson is a very pleasant AGE: 53 year old year old person diagnosed last week with a highly symptomatic right ureteral stone.  This is her first episode.  She has been in severe pain with poor control with oral medications.  Pain is mainly in the right flank extending down into the pelvis.  She is taking Flomax as well as ibuprofen twice daily.  She denies any signs or symptoms of infection including fevers or chills.  She has been monitoring her urine and has not seen a stone pass has not been using a strainer.    I personally reviewed her CT scan from last week showing a 4 mm ureteral stone in the far distal right ureter         Medications     Current Outpatient Medications   Medication     acetaminophen (TYLENOL) 500 MG tablet     albuterol (PROAIR HFA/PROVENTIL HFA/VENTOLIN HFA) 108 (90 Base) MCG/ACT inhaler     albuterol (PROVENTIL) (2.5 MG/3ML) 0.083% neb solution     ALLERGY RELIEF CETIRIZINE 10 MG tablet     amLODIPine (NORVASC) 2.5 MG tablet     atorvastatin (LIPITOR) 80 MG tablet     benzoyl peroxide (ACNE-CLEAR) 10 % external gel     blood glucose (ACCU-CHEK ALLYSON PLUS) test strip     blood glucose (NO BRAND SPECIFIED) lancets standard     blood glucose (NO BRAND SPECIFIED) lancets standard     blood glucose (NO BRAND SPECIFIED) test strip     blood glucose monitoring (NO BRAND SPECIFIED) meter device kit     budesonide-formoterol (SYMBICORT) 160-4.5 MCG/ACT Inhaler     buPROPion (WELLBUTRIN XL) 150 MG 24 hr tablet     cefdinir (OMNICEF) 300 MG capsule     clindamycin (CLINDAMAX) 1 % external gel     diazepam (VALIUM) 10 MG tablet     doxycycline hyclate (VIBRAMYCIN) 100 MG capsule     Emollient (CERAVE MOISTURIZING) CREA     furosemide (LASIX) 20 MG tablet     gabapentin (NEURONTIN) 100 MG capsule     hydrocortisone 2.5 % cream     hydrOXYzine (ATARAX) 50 MG tablet     ibuprofen (ADVIL/MOTRIN) 200 MG  tablet     lamoTRIgine (LAMICTAL) 150 MG tablet     levothyroxine (SYNTHROID/LEVOTHROID) 50 MCG tablet     lisinopril (ZESTRIL) 20 MG tablet     metFORMIN (GLUCOPHAGE) 500 MG tablet     minoxidil (ROGAINE) 5 % external solution     montelukast (SINGULAIR) 10 MG tablet     multivitamin, therapeutic (THERA-VIT) TABS tablet     nitroGLYcerin (NITROSTAT) 0.4 MG sublingual tablet     nystatin (MYCOSTATIN) 284371 UNIT/GM external powder     omeprazole (PRILOSEC) 20 MG DR capsule     ondansetron (ZOFRAN-ODT) 4 MG ODT tab     oxyCODONE-acetaminophen (PERCOCET) 5-325 MG tablet     permethrin (LICE TREATMENT CREME RINSE) 1 % external liquid     polyethylene glycol (MIRALAX) 17 GM/Dose powder     RYBELSUS 14 MG tablet     sucralfate (CARAFATE) 1 GM tablet     sulfamethoxazole-trimethoprim (BACTRIM DS) 800-160 MG tablet     terbinafine (LAMISIL) 1 % external cream     traZODone (DESYREL) 100 MG tablet     triamcinolone (KENALOG) 0.025 % external ointment     Vitamin D3 (CHOLECALCIFEROL) 125 MCG (5000 UT) tablet     No current facility-administered medications for this visit.         The following  distinct labs were reviewed    I personally reviewed all applicable laboratory data and went over findings with patient  Significant for:  July 5, 2023  Urinalysis with large blood, negative nitrate, trace leuk esterase  Urine culture with no growth  Creatinine 0.84  White blood cell count 12.3    *Note: Due to a large number of results and/or encounters for the requested time period, some results have not been displayed. A complete set of results can be found in Results Review.              Assessment/Plan   53 year old year old person with symptomatic right ureteral stone  -We discussed the nature of ureteral stones and obstruction.  We discussed spontaneous stone passage, medical expulsive therapy as well as stone removal.  The patient has ultimately decided to proceed with trial of passage.  We went over optimal treatment  strategies for management of colic including scheduled Tylenol and ibuprofen as well as Flomax and Dramamine.  A small course of oxycodone was prescribed for breakthrough pain.    They are aware to return to the office/ER if develops significant pain, inability to tolerate PO or signs of infection including fevers and chills.      CC:  Roro Chawla    20 minutes spent on clinical encounter including  Review of medical records  Review of outside records  Documentation  Coordinating follow-up medical care            Virtual Visit Details    Type of service:  Video Visit   Video Start Time: 2:35  Video End Time:2:50    Originating Location (pt. Location): Home    Distant Location (provider location):  On-site  Platform used for Video Visit: Michael

## 2023-07-17 ENCOUNTER — TELEPHONE (OUTPATIENT)
Dept: UROLOGY | Facility: CLINIC | Age: 53
End: 2023-07-17
Payer: COMMERCIAL

## 2023-07-17 DIAGNOSIS — N20.1 URETERAL STONE: Primary | ICD-10-CM

## 2023-07-17 NOTE — TELEPHONE ENCOUNTER
Spoke with: Patient       Date of surgery: Wednesday July 17 2023       Location: [facility] with Dr Herndon      Informed patient they will need a adult : YES      Pre op with provider: BUDDY      H&P Scheduled in PAC- Dr Herndon will update DOS        Pre procedure covid :NA      Additional imaging: NA        Surgery Packet : Patient given location info over the phone      Additional comments: Please call for surgery teaching

## 2023-07-17 NOTE — TELEPHONE ENCOUNTER
Spoke with patient for a short time, call dropped and unable to reach patient back.  Left her a message to call back to discuss ureteral stone.  Mary Jones RN    Was able to connect with patient who has continued to have severe pain with her ureteral stone.  She has been straining and has not passed her stone.  She is requesting to have stone removal procedure.  Provider advised.  Mary Jones RN

## 2023-07-18 ENCOUNTER — TELEPHONE (OUTPATIENT)
Dept: INTERNAL MEDICINE | Facility: CLINIC | Age: 53
End: 2023-07-18
Payer: COMMERCIAL

## 2023-07-18 ENCOUNTER — TELEPHONE (OUTPATIENT)
Dept: UROLOGY | Facility: CLINIC | Age: 53
End: 2023-07-18
Payer: COMMERCIAL

## 2023-07-18 NOTE — TELEPHONE ENCOUNTER
Spoke with patient and completed surgery teaching.  Reviewed time, date and address of procedure.    Length of procedure and what to expect after, phone number given for any questions.  Went over no eating drinking, may have clear liquids 3 hours prior to procedure.  Shower in morning before procedure.  Patient will need a ride home and someone to stay overnight.    Mary Jones RN

## 2023-07-19 ENCOUNTER — APPOINTMENT (OUTPATIENT)
Dept: RADIOLOGY | Facility: HOSPITAL | Age: 53
End: 2023-07-19
Attending: STUDENT IN AN ORGANIZED HEALTH CARE EDUCATION/TRAINING PROGRAM
Payer: COMMERCIAL

## 2023-07-19 ENCOUNTER — HOSPITAL ENCOUNTER (OUTPATIENT)
Facility: HOSPITAL | Age: 53
Discharge: HOME | End: 2023-07-19
Attending: STUDENT IN AN ORGANIZED HEALTH CARE EDUCATION/TRAINING PROGRAM | Admitting: STUDENT IN AN ORGANIZED HEALTH CARE EDUCATION/TRAINING PROGRAM
Payer: COMMERCIAL

## 2023-07-19 ENCOUNTER — ANESTHESIA EVENT (OUTPATIENT)
Dept: SURGERY | Facility: HOSPITAL | Age: 53
End: 2023-07-19
Payer: COMMERCIAL

## 2023-07-19 ENCOUNTER — ANESTHESIA (OUTPATIENT)
Dept: SURGERY | Facility: HOSPITAL | Age: 53
End: 2023-07-19
Payer: COMMERCIAL

## 2023-07-19 VITALS
OXYGEN SATURATION: 94 % | BODY MASS INDEX: 37.98 KG/M2 | TEMPERATURE: 97.7 F | DIASTOLIC BLOOD PRESSURE: 55 MMHG | RESPIRATION RATE: 12 BRPM | WEIGHT: 214.4 LBS | HEART RATE: 78 BPM | SYSTOLIC BLOOD PRESSURE: 110 MMHG

## 2023-07-19 DIAGNOSIS — N20.1 RIGHT URETERAL CALCULUS: Primary | ICD-10-CM

## 2023-07-19 PROBLEM — E66.01 CLASS 2 SEVERE OBESITY DUE TO EXCESS CALORIES WITH SERIOUS COMORBIDITY IN ADULT (H): Status: ACTIVE | Noted: 2023-07-19

## 2023-07-19 PROBLEM — E66.812 CLASS 2 SEVERE OBESITY DUE TO EXCESS CALORIES WITH SERIOUS COMORBIDITY IN ADULT (H): Status: ACTIVE | Noted: 2023-07-19

## 2023-07-19 PROCEDURE — 360N000083 HC SURGERY LEVEL 3 W/ FLUORO, PER MIN: Performed by: STUDENT IN AN ORGANIZED HEALTH CARE EDUCATION/TRAINING PROGRAM

## 2023-07-19 PROCEDURE — C1769 GUIDE WIRE: HCPCS | Performed by: STUDENT IN AN ORGANIZED HEALTH CARE EDUCATION/TRAINING PROGRAM

## 2023-07-19 PROCEDURE — 250N000011 HC RX IP 250 OP 636: Performed by: STUDENT IN AN ORGANIZED HEALTH CARE EDUCATION/TRAINING PROGRAM

## 2023-07-19 PROCEDURE — 999N000180 XR SURGERY CARM FLUORO LESS THAN 5 MIN

## 2023-07-19 PROCEDURE — 255N000002 HC RX 255 OP 636: Performed by: STUDENT IN AN ORGANIZED HEALTH CARE EDUCATION/TRAINING PROGRAM

## 2023-07-19 PROCEDURE — 999N000141 HC STATISTIC PRE-PROCEDURE NURSING ASSESSMENT: Performed by: STUDENT IN AN ORGANIZED HEALTH CARE EDUCATION/TRAINING PROGRAM

## 2023-07-19 PROCEDURE — 710N000012 HC RECOVERY PHASE 2, PER MINUTE: Performed by: STUDENT IN AN ORGANIZED HEALTH CARE EDUCATION/TRAINING PROGRAM

## 2023-07-19 PROCEDURE — 710N000009 HC RECOVERY PHASE 1, LEVEL 1, PER MIN: Performed by: STUDENT IN AN ORGANIZED HEALTH CARE EDUCATION/TRAINING PROGRAM

## 2023-07-19 PROCEDURE — 258N000003 HC RX IP 258 OP 636: Performed by: ANESTHESIOLOGY

## 2023-07-19 PROCEDURE — 250N000013 HC RX MED GY IP 250 OP 250 PS 637: Performed by: ANESTHESIOLOGY

## 2023-07-19 PROCEDURE — 74420 UROGRAPHY RTRGR +-KUB: CPT | Mod: 26 | Performed by: STUDENT IN AN ORGANIZED HEALTH CARE EDUCATION/TRAINING PROGRAM

## 2023-07-19 PROCEDURE — 250N000011 HC RX IP 250 OP 636: Performed by: ANESTHESIOLOGY

## 2023-07-19 PROCEDURE — 272N000001 HC OR GENERAL SUPPLY STERILE: Performed by: STUDENT IN AN ORGANIZED HEALTH CARE EDUCATION/TRAINING PROGRAM

## 2023-07-19 PROCEDURE — 250N000011 HC RX IP 250 OP 636: Mod: JZ | Performed by: NURSE ANESTHETIST, CERTIFIED REGISTERED

## 2023-07-19 PROCEDURE — 258N000003 HC RX IP 258 OP 636: Performed by: NURSE ANESTHETIST, CERTIFIED REGISTERED

## 2023-07-19 PROCEDURE — 370N000017 HC ANESTHESIA TECHNICAL FEE, PER MIN: Performed by: STUDENT IN AN ORGANIZED HEALTH CARE EDUCATION/TRAINING PROGRAM

## 2023-07-19 PROCEDURE — 250N000009 HC RX 250: Performed by: STUDENT IN AN ORGANIZED HEALTH CARE EDUCATION/TRAINING PROGRAM

## 2023-07-19 PROCEDURE — 250N000009 HC RX 250: Performed by: NURSE ANESTHETIST, CERTIFIED REGISTERED

## 2023-07-19 PROCEDURE — 52351 CYSTOURETERO & OR PYELOSCOPE: CPT | Mod: RT | Performed by: STUDENT IN AN ORGANIZED HEALTH CARE EDUCATION/TRAINING PROGRAM

## 2023-07-19 RX ORDER — ONDANSETRON 4 MG/1
4 TABLET, ORALLY DISINTEGRATING ORAL EVERY 30 MIN PRN
Status: DISCONTINUED | OUTPATIENT
Start: 2023-07-19 | End: 2023-07-19 | Stop reason: HOSPADM

## 2023-07-19 RX ORDER — NALOXONE HYDROCHLORIDE 0.4 MG/ML
0.2 INJECTION, SOLUTION INTRAMUSCULAR; INTRAVENOUS; SUBCUTANEOUS
Status: DISCONTINUED | OUTPATIENT
Start: 2023-07-19 | End: 2023-07-19 | Stop reason: HOSPADM

## 2023-07-19 RX ORDER — OXYCODONE HYDROCHLORIDE 5 MG/1
5 TABLET ORAL
Status: DISCONTINUED | OUTPATIENT
Start: 2023-07-19 | End: 2023-07-19 | Stop reason: HOSPADM

## 2023-07-19 RX ORDER — SODIUM CHLORIDE, SODIUM LACTATE, POTASSIUM CHLORIDE, CALCIUM CHLORIDE 600; 310; 30; 20 MG/100ML; MG/100ML; MG/100ML; MG/100ML
INJECTION, SOLUTION INTRAVENOUS CONTINUOUS
Status: DISCONTINUED | OUTPATIENT
Start: 2023-07-19 | End: 2023-07-19 | Stop reason: HOSPADM

## 2023-07-19 RX ORDER — LIDOCAINE HYDROCHLORIDE 10 MG/ML
INJECTION, SOLUTION INFILTRATION; PERINEURAL PRN
Status: DISCONTINUED | OUTPATIENT
Start: 2023-07-19 | End: 2023-07-19

## 2023-07-19 RX ORDER — PROPOFOL 10 MG/ML
INJECTION, EMULSION INTRAVENOUS PRN
Status: DISCONTINUED | OUTPATIENT
Start: 2023-07-19 | End: 2023-07-19

## 2023-07-19 RX ORDER — FENTANYL CITRATE 50 UG/ML
25 INJECTION, SOLUTION INTRAMUSCULAR; INTRAVENOUS EVERY 5 MIN PRN
Status: DISCONTINUED | OUTPATIENT
Start: 2023-07-19 | End: 2023-07-19 | Stop reason: HOSPADM

## 2023-07-19 RX ORDER — PROPOFOL 10 MG/ML
INJECTION, EMULSION INTRAVENOUS CONTINUOUS PRN
Status: DISCONTINUED | OUTPATIENT
Start: 2023-07-19 | End: 2023-07-19

## 2023-07-19 RX ORDER — OXYCODONE HYDROCHLORIDE 5 MG/1
10 TABLET ORAL
Status: DISCONTINUED | OUTPATIENT
Start: 2023-07-19 | End: 2023-07-19 | Stop reason: HOSPADM

## 2023-07-19 RX ORDER — AMOXICILLIN 250 MG
1-2 CAPSULE ORAL 2 TIMES DAILY
Qty: 30 TABLET | Refills: 0 | Status: SHIPPED | OUTPATIENT
Start: 2023-07-19 | End: 2024-08-15

## 2023-07-19 RX ORDER — ONDANSETRON 2 MG/ML
4 INJECTION INTRAMUSCULAR; INTRAVENOUS EVERY 30 MIN PRN
Status: DISCONTINUED | OUTPATIENT
Start: 2023-07-19 | End: 2023-07-19 | Stop reason: HOSPADM

## 2023-07-19 RX ORDER — ONDANSETRON 2 MG/ML
INJECTION INTRAMUSCULAR; INTRAVENOUS PRN
Status: DISCONTINUED | OUTPATIENT
Start: 2023-07-19 | End: 2023-07-19

## 2023-07-19 RX ORDER — FENTANYL CITRATE 50 UG/ML
INJECTION, SOLUTION INTRAMUSCULAR; INTRAVENOUS PRN
Status: DISCONTINUED | OUTPATIENT
Start: 2023-07-19 | End: 2023-07-19

## 2023-07-19 RX ORDER — TAMSULOSIN HYDROCHLORIDE 0.4 MG/1
0.4 CAPSULE ORAL DAILY
Qty: 5 CAPSULE | Refills: 0 | Status: SHIPPED | OUTPATIENT
Start: 2023-07-19 | End: 2023-07-24

## 2023-07-19 RX ORDER — LIDOCAINE 40 MG/G
CREAM TOPICAL
Status: DISCONTINUED | OUTPATIENT
Start: 2023-07-19 | End: 2023-07-19 | Stop reason: HOSPADM

## 2023-07-19 RX ORDER — HALOPERIDOL 5 MG/ML
1 INJECTION INTRAMUSCULAR
Status: DISCONTINUED | OUTPATIENT
Start: 2023-07-19 | End: 2023-07-19 | Stop reason: HOSPADM

## 2023-07-19 RX ORDER — KETOROLAC TROMETHAMINE 30 MG/ML
INJECTION, SOLUTION INTRAMUSCULAR; INTRAVENOUS PRN
Status: DISCONTINUED | OUTPATIENT
Start: 2023-07-19 | End: 2023-07-19

## 2023-07-19 RX ORDER — HYDROMORPHONE HYDROCHLORIDE 1 MG/ML
0.2 INJECTION, SOLUTION INTRAMUSCULAR; INTRAVENOUS; SUBCUTANEOUS EVERY 5 MIN PRN
Status: DISCONTINUED | OUTPATIENT
Start: 2023-07-19 | End: 2023-07-19 | Stop reason: HOSPADM

## 2023-07-19 RX ORDER — FENTANYL CITRATE 50 UG/ML
25 INJECTION, SOLUTION INTRAMUSCULAR; INTRAVENOUS
Status: DISCONTINUED | OUTPATIENT
Start: 2023-07-19 | End: 2023-07-19 | Stop reason: HOSPADM

## 2023-07-19 RX ORDER — ONDANSETRON 4 MG/1
4 TABLET, ORALLY DISINTEGRATING ORAL EVERY 8 HOURS PRN
Qty: 4 TABLET | Refills: 0 | Status: SHIPPED | OUTPATIENT
Start: 2023-07-19

## 2023-07-19 RX ORDER — MEPERIDINE HYDROCHLORIDE 25 MG/ML
12.5 INJECTION INTRAMUSCULAR; INTRAVENOUS; SUBCUTANEOUS EVERY 5 MIN PRN
Status: DISCONTINUED | OUTPATIENT
Start: 2023-07-19 | End: 2023-07-19 | Stop reason: HOSPADM

## 2023-07-19 RX ORDER — DEXAMETHASONE SODIUM PHOSPHATE 10 MG/ML
INJECTION, SOLUTION INTRAMUSCULAR; INTRAVENOUS PRN
Status: DISCONTINUED | OUTPATIENT
Start: 2023-07-19 | End: 2023-07-19

## 2023-07-19 RX ORDER — ACETAMINOPHEN 325 MG/1
975 TABLET ORAL ONCE
Status: COMPLETED | OUTPATIENT
Start: 2023-07-19 | End: 2023-07-19

## 2023-07-19 RX ORDER — CEFAZOLIN SODIUM/WATER 2 G/20 ML
2 SYRINGE (ML) INTRAVENOUS
Status: COMPLETED | OUTPATIENT
Start: 2023-07-19 | End: 2023-07-19

## 2023-07-19 RX ORDER — HYDROMORPHONE HYDROCHLORIDE 1 MG/ML
0.4 INJECTION, SOLUTION INTRAMUSCULAR; INTRAVENOUS; SUBCUTANEOUS EVERY 5 MIN PRN
Status: DISCONTINUED | OUTPATIENT
Start: 2023-07-19 | End: 2023-07-19 | Stop reason: HOSPADM

## 2023-07-19 RX ORDER — NALOXONE HYDROCHLORIDE 0.4 MG/ML
0.4 INJECTION, SOLUTION INTRAMUSCULAR; INTRAVENOUS; SUBCUTANEOUS
Status: DISCONTINUED | OUTPATIENT
Start: 2023-07-19 | End: 2023-07-19 | Stop reason: HOSPADM

## 2023-07-19 RX ORDER — FENTANYL CITRATE 50 UG/ML
50 INJECTION, SOLUTION INTRAMUSCULAR; INTRAVENOUS EVERY 5 MIN PRN
Status: DISCONTINUED | OUTPATIENT
Start: 2023-07-19 | End: 2023-07-19 | Stop reason: HOSPADM

## 2023-07-19 RX ADMIN — FENTANYL CITRATE 50 MCG: 50 INJECTION, SOLUTION INTRAMUSCULAR; INTRAVENOUS at 12:27

## 2023-07-19 RX ADMIN — SODIUM CHLORIDE, POTASSIUM CHLORIDE, SODIUM LACTATE AND CALCIUM CHLORIDE: 600; 310; 30; 20 INJECTION, SOLUTION INTRAVENOUS at 11:32

## 2023-07-19 RX ADMIN — MIDAZOLAM 2 MG: 1 INJECTION INTRAMUSCULAR; INTRAVENOUS at 11:36

## 2023-07-19 RX ADMIN — DEXAMETHASONE SODIUM PHOSPHATE 10 MG: 10 INJECTION, SOLUTION INTRAMUSCULAR; INTRAVENOUS at 11:42

## 2023-07-19 RX ADMIN — Medication 2 G: at 11:51

## 2023-07-19 RX ADMIN — ACETAMINOPHEN 975 MG: 325 TABLET ORAL at 10:31

## 2023-07-19 RX ADMIN — LIDOCAINE HYDROCHLORIDE 5 ML: 10 INJECTION, SOLUTION INFILTRATION; PERINEURAL at 11:42

## 2023-07-19 RX ADMIN — PROPOFOL 150 MCG/KG/MIN: 10 INJECTION, EMULSION INTRAVENOUS at 11:47

## 2023-07-19 RX ADMIN — KETOROLAC TROMETHAMINE 15 MG: 30 INJECTION, SOLUTION INTRAMUSCULAR at 12:12

## 2023-07-19 RX ADMIN — HYDROMORPHONE HYDROCHLORIDE 0.4 MG: 1 INJECTION, SOLUTION INTRAMUSCULAR; INTRAVENOUS; SUBCUTANEOUS at 12:34

## 2023-07-19 RX ADMIN — ONDANSETRON 4 MG: 2 INJECTION INTRAMUSCULAR; INTRAVENOUS at 11:42

## 2023-07-19 RX ADMIN — FENTANYL CITRATE 50 MCG: 50 INJECTION, SOLUTION INTRAMUSCULAR; INTRAVENOUS at 12:20

## 2023-07-19 RX ADMIN — FENTANYL CITRATE 100 MCG: 50 INJECTION, SOLUTION INTRAMUSCULAR; INTRAVENOUS at 11:42

## 2023-07-19 RX ADMIN — PHENYLEPHRINE HYDROCHLORIDE 200 MCG: 10 INJECTION INTRAVENOUS at 11:58

## 2023-07-19 RX ADMIN — PROPOFOL 200 MG: 10 INJECTION, EMULSION INTRAVENOUS at 11:42

## 2023-07-19 ASSESSMENT — ACTIVITIES OF DAILY LIVING (ADL)
ADLS_ACUITY_SCORE: 37
ADLS_ACUITY_SCORE: 35

## 2023-07-19 ASSESSMENT — COPD QUESTIONNAIRES: COPD: 1

## 2023-07-19 NOTE — OP NOTE
OPERATIVE NOTE    PREOPERATIVE DIAGNOSIS:  Right-sided ureteral stone    POSTOPERATIVE DIAGNOSIS:  No evidence of ureteral or renal calculi on the right side    PROCEDURES PERFORMED:   1. Cystourethroscopy  2.  Right ureteroscopy  3.  Right retrograde pyelogram with interpretation of intraoperative fluoroscopic imaging    STAFF SURGEON:  Dr. Sammy Herndon MD, present for the entire case.     ANESTHESIA:  General    ESTIMATED BLOOD LOSS: 0 cc  DRAINS/TUBES:  None         IV FLUIDS:   Please see dictated anesthesia record  COMPLICATIONS:  None.   SPECIMEN:   Stones for analysis    SIGNIFICANT FINDINGS: Normal findings in cystoscopy and ureteroscopy no evidence of calculi seen on the right urinary tract.  Proximal curl of the ureteral stent seen in the renal pelvis under fluoroscopy and distal curl seen in the bladder fluoroscopically and under direct vision.     BRIEF OPERATIVE INDICATIONS: Swetha Patterson is a(n) 53 year old female with history of intractable right lower abdominal and flank pain due to obstructing right distal ureteral calculus.  After a discussion of all risks, benefits, and alternatives, the patient elected to proceed with definitive stone management. The patient understands the potential need for more than one procedure to eliminate all stone burden.     DESCRIPTION OF PROCEDURE:  After informed consent was obtained, the patient was transported to the operating room & placed supine on the table. Pneumoboots were applied.  After adequate anesthesia was induced, she was placed in lithotomy and prepped and draped in the usual sterile fashion. A timeout was taken to confirm correct patient, procedure and laterality. Pre-operative IV antibiotics were administered.     A 22-Kinyarwanda rigid cystoscope was inserted into a well-lubricated urethra. The anterior urethra was unremarkable. The right ureteral orifice was identified and  cannulated with a Sensor wire and 5 Kinyarwanda open-ended catheter. The wire  passed without resistance into the upper pole and the 5-Mongolian open ended catheter was removed after retrograde pyelogram which did not reveal any hydronephrosis.  The right ureteral orifice appeared normal  The flexible ureteroscope was used advanced into the right ureter over a second wire. A complete and thorough ureteroscopy using the flexible ureteroscope did not reveal any calculus in the kidney or in the ureter in its entire extent.  After completion of ureteroscopy Sensor wire was removed. The bladder was drained.  The patient tolerated the procedure well and there were no apparent complications. The patient  was transported to the postanesthesia care unit in stable condition.     POST-OPERATIVE PLAN: Following recovery in the PACU, the patient be discharged.  Follow-up with me in urology clinic in 3 months with Litholink x2 and ultrasound kidneys.      Sammy Herndon MD  [facility], Urology

## 2023-07-19 NOTE — ANESTHESIA POSTPROCEDURE EVALUATION
Patient: Swetha Patterson    Procedure: Procedure(s):  Cystoscopy, Right Ureteroscopy, Right Retrograde Pyelogram       Anesthesia Type:  General    Note:  Disposition: Outpatient   Postop Pain Control: Uneventful            Sign Out: Well controlled pain   PONV: No   Neuro/Psych: Uneventful            Sign Out: Acceptable/Baseline neuro status   Airway/Respiratory: Uneventful            Sign Out: Acceptable/Baseline resp. status   CV/Hemodynamics: Uneventful            Sign Out: Acceptable CV status; No obvious hypovolemia; No obvious fluid overload   Other NRE: NONE   DID A NON-ROUTINE EVENT OCCUR? No           Last vitals:  Vitals Value Taken Time   /58 07/19/23 1245   Temp     Pulse 74 07/19/23 1247   Resp 16 07/19/23 1247   SpO2 93 % 07/19/23 1247   Vitals shown include unvalidated device data.    Electronically Signed By: MONSTER VILLANUEVA MD  July 19, 2023  1:16 PM

## 2023-07-19 NOTE — ANESTHESIA PROCEDURE NOTES
Airway       Patient location during procedure: OR       Procedure Start/Stop Times: 7/19/2023 11:52 AM  Staff -        Performed By: CRNA  Consent for Airway        Urgency: elective  Indications and Patient Condition       Indications for airway management: elizabeth-procedural       Induction type:intravenous       Mask difficulty assessment: 1 - vent by mask    Final Airway Details       Final airway type: supraglottic airway    Supraglottic Airway Details        Type: LMA       Brand: Ambu AuraGain       LMA size: 4    Post intubation assessment        Placement verified by: capnometry, equal breath sounds and chest rise        Number of attempts at approach: 1       Number of other approaches attempted: 0       Secured with: commercial tube tobias       Ease of procedure: easy    Medication(s) Administered   Medication Administration Time: 7/19/2023 11:52 AM

## 2023-07-19 NOTE — ANESTHESIA PREPROCEDURE EVALUATION
Anesthesia Pre-Procedure Evaluation    Patient: Swetha Patterson   MRN: [id number] : 1970        Procedure : Procedure(s):  Cystoscopy, Right Ureteroscopy, Right Holmium Laser Lithotripsy, Right Retrograde Pyelogram, Possible Right Ureteral Stent Placement          Past Medical History:   Diagnosis Date     Benign essential hypertension      Bipolar disorder (H)      Chronic abdominal pain      Chronic constipation      COPD (chronic obstructive pulmonary disease) (H)      Hypothyroidism      Impaired fasting glucose      Kidney stone      Obesity (BMI 30-39.9)      PONV (postoperative nausea and vomiting)      Pre-diabetes      Pure hypercholesterolemia       Past Surgical History:   Procedure Laterality Date     ANESTHESIA OUT OF OR MRI N/A 10/07/2021    Procedure: ANESTHESIA OUT OF OR MRI;  Surgeon: GENERIC ANESTHESIA PROVIDER;  Location: [facility]     ANESTHESIA OUT OF OR MRI N/A 2023    Procedure: MRI OF NECK WITH AND WITHOUT CONTRAST;  Surgeon: GENERIC ANESTHESIA PROVIDER;  [facility]: [facility]     ANESTHESIA OUT OF OR MRI Left 2023    Procedure: Anesthesia out of OR MRI;  Surgeon: GENERIC ANESTHESIA PROVIDER;  Location: [facility]     APPENDECTOMY       ARTHROSCOPY SHOULDER DECOMPRESSION Left 10/21/2015    Procedure: ARTHROSCOPY SHOULDER DECOMPRESSION;  Surgeon: Julien Milian MD;  Location: [facility]     BIOPSY ARTERY TEMPORAL Left 2020    Procedure: LEFT TEMPORAL ARTERY BIOPSY;  Surgeon: Ibeth Conner MD;  [facility]: [facility]     BRONCHIAL THERMOPLASTY N/A 2014    Procedure: BRONCHIAL THERMOPLASTY;  Surgeon: Ward Whitaker MD;  [facility]     BRONCHIAL THERMOPLASTY N/A 2014    Procedure: BRONCHIAL THERMOPLASTY;  Surgeon: Ward Whitaker MD;  [facility]     BRONCHIAL THERMOPLASTY N/A 2015    Procedure: BRONCHIAL THERMOPLASTY;  Surgeon: Ward Whitaker MD;  [facility]     COLONOSCOPY N/A 2018    Procedure: COLONOSCOPY ([facility]);  Surgeon:  Marshall Oakes MD;  Location:  OR     COLONOSCOPY N/A 10/22/2020    Procedure: Colonoscopy;  Surgeon: Marshall Mccormick MD;  Location:  OR     COLONOSCOPY N/A 01/20/2023    Procedure: COLONOSCOPY, FLEXIBLE, WITH LESION REMOVAL USING SNARE;  Surgeon: Carson Domínguez MD;  Location:  GI     DISCECTOMY, FUSION CERVICAL ANTERIOR ONE LEVEL, COMBINED N/A 05/01/2018    Procedure: COMBINED DISCECTOMY, FUSION CERVICAL ANTERIOR ONE LEVEL;  1.  C5-C6 anterior cervical diskectomy and fusion.    2.  C5-C6 application of intervertebral biomechanical device for interbody fusion purposes.    3.  C5-C6 anterior instrumentation using the standard Kristin InViZia 24 mm plate with four associated bone screws, 12 mm in length.  Inferior screws are fixed.  Superior screws are va     ESOPHAGOSCOPY, GASTROSCOPY, DUODENOSCOPY (EGD), COMBINED N/A 10/22/2020    Procedure: Esophagoscopy, gastroscopy, duodenoscopy with biopsies;  Surgeon: Marshall Mccormick MD;  Location:  OR     ESOPHAGOSCOPY, GASTROSCOPY, DUODENOSCOPY (EGD), COMBINED N/A 06/17/2021    Procedure: ESOPHAGOGASTRODUODENOSCOPY, WITH BIOPSY;  Surgeon: Darrel Damon MD;  Location:  GI     EXCISE MASS NECK Left 02/01/2023    Procedure: exploration of  NECK left;  Surgeon: Ibeth Conner MD;  Location:  OR     EXCISE MASS TRUNK Left 11/03/2021    Procedure: EXCISION LEFT SHOULDER LIPOMA;  Surgeon: Ibeth Conner MD;  Location:  OR     EXCISE NODE MEDIASTINAL  04/26/2013    Procedure: EXCISE NODE MEDIASTINAL;;  Surgeon: Av Peña MD;  Location:  OR     LAPAROSCOPIC CHOLECYSTECTOMY N/A 10/19/2017    Procedure: LAPAROSCOPIC CHOLECYSTECTOMY;  LAPAROSCOPIC CHOLECYSTECTOMY;  Surgeon: Ney Jerry MD;  Location:  OR     LARYNGOSCOPY, EXCISE VOCAL CORD LESION, COMBINED       THORACOSCOPY  04/26/2013    Procedure: THORACOSCOPY;  LEFT VIDEO ASSISTED THORACOSCOPY, RESECTION OF POSTERIOR MEDIASTINAL MASS;  Surgeon: Av Peña  MD Mehrdad;  Location:  OR     TONSILLECTOMY & ADENOIDECTOMY        Allergies   Allergen Reactions     Codeine Nausea and Vomiting     Cyclobenzaprine Nausea and Vomiting     Hydrocodone Nausea and Vomiting      Social History     Tobacco Use     Smoking status: Every Day     Packs/day: 0.30     Years: 30.00     Pack years: 9.00     Types: Cigarettes     Passive exposure: Past     Smokeless tobacco: Never   Substance Use Topics     Alcohol use: Not Currently      Wt Readings from Last 1 Encounters:   07/19/23 97.3 kg (214 lb 6.4 oz)        Anesthesia Evaluation   Pt has had prior anesthetic.     History of anesthetic complications  - PONV.      ROS/MED HX  ENT/Pulmonary:     (+) COPD,     Neurologic:    (-) no CVA   Cardiovascular:     (+) Dyslipidemia hypertension-----    METS/Exercise Tolerance:     Hematologic:  - neg hematologic  ROS     Musculoskeletal: Comment: S/p C5-C6 fusion       GI/Hepatic:     (+) GERD, Asymptomatic on medication,     Renal/Genitourinary:     (+) Nephrolithiasis ,     Endo:     (+) type II DM, thyroid problem, hypothyroidism, Obesity,     Psychiatric/Substance Use:     (+) psychiatric history anxiety, depression and bipolar H/O chronic opiod use .     Infectious Disease:  - neg infectious disease ROS     Malignancy:  - neg malignancy ROS     Other:  - neg other ROS          Physical Exam    Airway  airway exam normal      Mallampati: II   TM distance: > 3 FB   Neck ROM: full   Mouth opening: > 3 cm    Respiratory Devices and Support         Dental     Comment: Upper and lower partial dentures     (+) Multiple visibly decayed, broken teeth      Cardiovascular   cardiovascular exam normal       Rhythm and rate: regular and normal     Pulmonary   pulmonary exam normal        breath sounds clear to auscultation           OUTSIDE LABS:  CBC:   Lab Results   Component Value Date    WBC 11.8 (H) 04/12/2023    WBC 9.0 12/23/2022    HGB 11.6 (L) 04/12/2023    HGB 11.7 12/23/2022    HCT 36.0  04/12/2023    HCT 36.3 12/23/2022     04/12/2023     12/23/2022     BMP:   Lab Results   Component Value Date     04/12/2023     12/23/2022    POTASSIUM 3.9 04/12/2023    POTASSIUM 4.8 02/01/2023    CHLORIDE 103 04/12/2023    CHLORIDE 101 12/23/2022    CO2 24 04/12/2023    CO2 24 12/23/2022    BUN 15.3 04/12/2023    BUN 17.4 12/23/2022    CR 0.81 04/12/2023    CR 1.08 (H) 02/01/2023     (H) 04/12/2023     (H) 02/01/2023     COAGS:   Lab Results   Component Value Date    INR 0.99 11/20/2020     POC:   Lab Results   Component Value Date    BGM 90 11/21/2020    HCG Negative 12/23/2022    HCGS Negative 11/12/2021     HEPATIC:   Lab Results   Component Value Date    ALBUMIN 4.3 04/12/2023    PROTTOTAL 7.3 04/12/2023    ALT 31 04/12/2023    AST 34 04/12/2023    ALKPHOS 169 (H) 04/12/2023    BILITOTAL <0.2 04/12/2023     OTHER:   Lab Results   Component Value Date    PH 7.38 11/12/2021    LACT 1.8 11/12/2021    A1C 5.5 08/17/2022    GENE 9.0 04/12/2023    MAG 1.9 03/19/2013    LIPASE 101 05/22/2021    TSH 6.29 (H) 08/17/2022    T4 0.96 08/17/2022    CRP 16.0 (H) 02/20/2020    SED 16 02/20/2020       Anesthesia Plan    ASA Status:  3   NPO Status:  NPO Appropriate    Anesthesia Type: General.     - Airway: LMA   Induction: Intravenous, Propofol.   Maintenance: TIVA.        Consents    Anesthesia Plan(s) and associated risks, benefits, and realistic alternatives discussed. Questions answered and patient/representative(s) expressed understanding.    - Discussed:     - Discussed with:  Patient         Postoperative Care    Pain management: IV analgesics, Oral pain medications, Multi-modal analgesia.   PONV prophylaxis: Ondansetron (or other 5HT-3), Dexamethasone or Solumedrol, Droperidol or Haldol     Comments:                MONSTER VILLANUEVA MD

## 2023-07-19 NOTE — PATIENT INSTRUCTIONS
Labs are stable. Your cholesterol is better. Continue your current doses of medications. Keep up the good work. Thanks.   [address] Please have a gastric emptying scan done.    Try to eat smaller meals and snacks more frequently throughout the day.  Don't lay down or go to sleep within a few hours after eating.    Stop taking carafate (sucralfate).  You can use things like gaviscon, mylanta, maalox, etc instead before meals as a coating agent for your stomach.    Try bentyl (dicyclomine) as needed for abdominal cramping.    Please see the bariatric surgeon.    Please continue to try to stop smoking. Stop using NSAIDs (ibuprofen, motrin, aleve, advil, naproxen, etc)

## 2023-07-19 NOTE — H&P
Urology H&P update    Urology Consult    Name:  Swetha Patterson  MRN:  [id number]  Age/: 53 year old, 1970    CC: right ureteral stone    HPI: Swetha Patterson is a(n) 53 year old female is here for a ureteroscopy and laser lithotripsy for an obstructing right ureteral stone.  He was recently seen in the ED on 2023 and had an overall normal examination she does not note any significant change in her overall general condition apart from urinary symptoms which have been persistent.    Past Medical History:  Past Medical History:   Diagnosis Date     Benign essential hypertension      Bipolar disorder (H)      Chronic abdominal pain      Chronic constipation      COPD (chronic obstructive pulmonary disease) (H)      Hypothyroidism      Impaired fasting glucose      Kidney stone      Obesity (BMI 30-39.9)      PONV (postoperative nausea and vomiting)      Pre-diabetes      Pure hypercholesterolemia        Past Surgical History:  Past Surgical History:   Procedure Laterality Date     ANESTHESIA OUT OF OR MRI N/A 10/07/2021    Procedure: ANESTHESIA OUT OF OR MRI;  Surgeon: GENERIC ANESTHESIA PROVIDER;  [facility]: [facility]     ANESTHESIA OUT OF OR MRI N/A 2023    Procedure: MRI OF NECK WITH AND WITHOUT CONTRAST;  Surgeon: GENERIC ANESTHESIA PROVIDER;  [facility]: [facility]     ANESTHESIA OUT OF OR MRI Left 2023    Procedure: Anesthesia out of OR MRI;  Surgeon: GENERIC ANESTHESIA PROVIDER;  Location: [facility]     APPENDECTOMY       ARTHROSCOPY SHOULDER DECOMPRESSION Left 10/21/2015    Procedure: ARTHROSCOPY SHOULDER DECOMPRESSION;  Surgeon: Julien Milian MD;  [facility]: [facility]     BIOPSY ARTERY TEMPORAL Left 2020    Procedure: LEFT TEMPORAL ARTERY BIOPSY;  Surgeon: Ibeth Conner MD;  [facility]: [facility]     BRONCHIAL THERMOPLASTY N/A 2014    Procedure: BRONCHIAL THERMOPLASTY;  Surgeon: Ward Whitaker MD;  [facility]     BRONCHIAL THERMOPLASTY N/A 2014    Procedure: BRONCHIAL  THERMOPLASTY;  Surgeon: Ward Whitaker MD;  [facility]     BRONCHIAL THERMOPLASTY N/A 02/06/2015    Procedure: BRONCHIAL THERMOPLASTY;  Surgeon: Ward Whitaker MD;  [facility]     COLONOSCOPY N/A 11/26/2018    Procedure: COLONOSCOPY ([facility]);  Surgeon: Marshall Oakes MD;  Location:  OR     COLONOSCOPY N/A 10/22/2020    Procedure: Colonoscopy;  Surgeon: Marshall Mccormick MD;  Location:  OR     COLONOSCOPY N/A 01/20/2023    Procedure: COLONOSCOPY, FLEXIBLE, WITH LESION REMOVAL USING SNARE;  Surgeon: Carson Domínguez MD;  Location:  GI     DISCECTOMY, FUSION CERVICAL ANTERIOR ONE LEVEL, COMBINED N/A 05/01/2018    Procedure: COMBINED DISCECTOMY, FUSION CERVICAL ANTERIOR ONE LEVEL;  1.  C5-C6 anterior cervical diskectomy and fusion.    2.  C5-C6 application of intervertebral biomechanical device for interbody fusion purposes.    3.  C5-C6 anterior instrumentation using the standard Kristin InViZia 24 mm plate with four associated bone screws, 12 mm in length.  Inferior screws are fixed.  Superior screws are va     ESOPHAGOSCOPY, GASTROSCOPY, DUODENOSCOPY (EGD), COMBINED N/A 10/22/2020    Procedure: Esophagoscopy, gastroscopy, duodenoscopy with biopsies;  Surgeon: Marshall Mccormick MD;  Location:  OR     ESOPHAGOSCOPY, GASTROSCOPY, DUODENOSCOPY (EGD), COMBINED N/A 06/17/2021    Procedure: ESOPHAGOGASTRODUODENOSCOPY, WITH BIOPSY;  Surgeon: Darrel Damon MD;  Location:  GI     EXCISE MASS NECK Left 02/01/2023    Procedure: exploration of  NECK left;  Surgeon: Ibeth Conner MD;  Location:  OR     EXCISE MASS TRUNK Left 11/03/2021    Procedure: EXCISION LEFT SHOULDER LIPOMA;  Surgeon: Ibeth Conner MD;  Location:  OR     EXCISE NODE MEDIASTINAL  04/26/2013    Procedure: EXCISE NODE MEDIASTINAL;;  Surgeon: Av Peña MD;  Location:  OR     LAPAROSCOPIC CHOLECYSTECTOMY N/A 10/19/2017    Procedure: LAPAROSCOPIC CHOLECYSTECTOMY;  LAPAROSCOPIC  CHOLECYSTECTOMY;  Surgeon: Ney Jerry MD;  Location:  OR     LARYNGOSCOPY, EXCISE VOCAL CORD LESION, COMBINED       THORACOSCOPY  04/26/2013    Procedure: THORACOSCOPY;  LEFT VIDEO ASSISTED THORACOSCOPY, RESECTION OF POSTERIOR MEDIASTINAL MASS;  Surgeon: Av Peña MD;  Location:  OR     TONSILLECTOMY & ADENOIDECTOMY         Allergies:     Allergies   Allergen Reactions     Codeine Nausea and Vomiting     Cyclobenzaprine Nausea and Vomiting     Hydrocodone Nausea and Vomiting       Medications:  No current facility-administered medications on file prior to encounter.  amLODIPine (NORVASC) 2.5 MG tablet, Take 2 tablets (5 mg) by mouth daily Appointment required for further refills  acetaminophen (TYLENOL) 500 MG tablet, Take 2 tablets (1,000 mg) by mouth every 6 hours as needed for pain  acetaminophen (TYLENOL) 650 MG CR tablet, Take 1 tablet (650 mg) by mouth every 8 hours as needed for mild pain or fever  albuterol (PROAIR HFA/PROVENTIL HFA/VENTOLIN HFA) 108 (90 Base) MCG/ACT inhaler, INHALE TWO PUFFS BY MOUTH EVERY 6 HOURS  albuterol (PROVENTIL) (2.5 MG/3ML) 0.083% neb solution, INHALE ONE VIAL BY NEBULIZER EVERY 6 HOURS AS NEEDED FOR SHORTNESS OF BREATH OR WHEEZING Strength: (2.5 MG/3ML) 0.083%  ALLERGY RELIEF CETIRIZINE 10 MG tablet, TAKE ONE TABLET BY MOUTH EVERY DAY AS NEEDED  atorvastatin (LIPITOR) 80 MG tablet, TAKE ONE TABLET BY MOUTH EVERY DAY  benzoyl peroxide (ACNE-CLEAR) 10 % external gel, Apply topically 2 times daily as needed  blood glucose (ACCU-CHEK ALLYSON PLUS) test strip, USE TO TEST BLOOD SUGAR ONCE DAILY OR AS DIRECTED  blood glucose (NO BRAND SPECIFIED) lancets standard, Use to test blood sugar 1 times daily or as directed.  blood glucose (NO BRAND SPECIFIED) lancets standard, Use to test blood sugar 1 times daily or as directed.  blood glucose (NO BRAND SPECIFIED) test strip, Use to test blood sugar 1 times daily or as directed.  Dispense Accu-chek Allyson Plus or per  patient insurance  blood glucose monitoring (NO BRAND SPECIFIED) meter device kit, Use to test blood sugar 1 times daily or as directed.  Dispense Accu-chek Olinda Plus or per insurance preference  budesonide-formoterol (SYMBICORT) 160-4.5 MCG/ACT Inhaler, Inhale 2 puffs into the lungs 2 times daily  buPROPion (WELLBUTRIN XL) 150 MG 24 hr tablet, TAKE ONE TABLET BY MOUTH EVERY MORNING  cefdinir (OMNICEF) 300 MG capsule, Take 1 capsule (300 mg) by mouth 2 times daily  clindamycin (CLINDAMAX) 1 % external gel, Apply topically 2 times daily  diazepam (VALIUM) 10 MG tablet, Take 1 tablet by mouth 2 times daily  dimenhyDRINATE (DRAMAMINE) 50 MG tablet, Take 1 tablet (50 mg) by mouth nightly as needed for sleep  doxycycline hyclate (VIBRAMYCIN) 100 MG capsule, Take 1 capsule (100 mg) by mouth 2 times daily  Emollient (CERAVE MOISTURIZING) CREA, Externally apply 1 Application topically 2 times daily  furosemide (LASIX) 20 MG tablet, TAKE ONE TABLET BY MOUTH TWICE A DAY  gabapentin (NEURONTIN) 100 MG capsule, Take 1 capsule (100 mg) by mouth 3 times daily  hydrocortisone 2.5 % cream, APPLY TO THE AFFECTED AREA(S) TWO TIMES A DAY  hydrOXYzine (ATARAX) 50 MG tablet, TAKE TWO TABLETS BY MOUTH THREE TIMES A DAY AS NEEDED FOR ANXIETY  ibuprofen (ADVIL/MOTRIN) 200 MG tablet, Take 3 tablets (600 mg) by mouth every 6 hours as needed for moderate pain (4-6)  ibuprofen (ADVIL/MOTRIN) 400 MG tablet, Take 1 tablet (400 mg) by mouth every 6 hours as needed for moderate pain  lamoTRIgine (LAMICTAL) 150 MG tablet, Take 150 mg by mouth 2 times daily  levothyroxine (SYNTHROID/LEVOTHROID) 50 MCG tablet, TAKE ONE TABLET BY MOUTH EVERY DAY  lisinopril (ZESTRIL) 20 MG tablet, TAKE ONE TABLET BY MOUTH EVERY DAY  metFORMIN (GLUCOPHAGE) 500 MG tablet, TAKE ONE TABLET BY MOUTH EVERY DAY WITH BREAKFAST  minoxidil (ROGAINE) 5 % external solution, apply per package directions to the scalp once daily  montelukast (SINGULAIR) 10 MG tablet, TAKE ONE  TABLET BY MOUTH AT BEDTIME  multivitamin, therapeutic (THERA-VIT) TABS tablet, Take 1 tablet by mouth daily  naloxone (NARCAN) 4 MG/0.1ML nasal spray, Spray 1 spray (4 mg) into one nostril alternating nostrils once as needed for opioid reversal every 2-3 minutes until assistance arrives  nitroGLYcerin (NITROSTAT) 0.4 MG sublingual tablet, PLACE ONE TABLET UNDER THE TONGUE AT THE 1ST SIGN OF ATTACK. IF PAIN IS UNRELIEVED OR WORSENED 5 MINUTES AFTER 1ST DOSE, PROMPT MEDICAL ASSISTANCE IS NEEDED. MAY REPEAT EVERY 5 MINUTES UNTIL PAIN IS RELIEVED. MAX OF THREE DOSES  nystatin (MYCOSTATIN) 779984 UNIT/GM external powder, APPLY TOPICALLY TWICE A DAY AS NEEDED SKIN RASH  omeprazole (PRILOSEC) 20 MG DR capsule, TAKE ONE CAPSULE BY MOUTH TWICE A DAY  ondansetron (ZOFRAN-ODT) 4 MG ODT tab, Take 1 tablet (4 mg) by mouth every 6 hours as needed for nausea  oxyCODONE-acetaminophen (PERCOCET) 5-325 MG tablet, Take 1 tablet by mouth every 6 hours as needed  permethrin (LICE TREATMENT CREME RINSE) 1 % external liquid, Apply topically from the neck down, leave on overnight (approx. 8 hours) and repeat 1 weeks later.  polyethylene glycol (MIRALAX) 17 GM/Dose powder, DISSOLVE 17 GRAMS (1 CAPFUL) AND DRINK BY MOUTH ONCE DAILY Strength: 17 GM/SCOOP  RYBELSUS 14 MG tablet, TAKE ONE TABLET BY MOUTH EVERY DAY  sucralfate (CARAFATE) 1 GM tablet, TAKE ONE TABLET BY MOUTH 4 TIMES A DAY AS NEEDED FOR NAUSEA  sulfamethoxazole-trimethoprim (BACTRIM DS) 800-160 MG tablet, Take 1 tablet by mouth 2 times daily Take one tablet twice daily. For additional refills, please schedule a follow-up appointment.  terbinafine (LAMISIL) 1 % external cream, APPLY TO AFFECTED AREA(S) TWO TIMES A DAY  traZODone (DESYREL) 100 MG tablet, 100 mg  triamcinolone (KENALOG) 0.025 % external ointment, Apply topically 2 times daily  Vitamin D3 (CHOLECALCIFEROL) 125 MCG (5000 UT) tablet, Take 1 tablet by mouth three times a week        Social History:  Social History      Socioeconomic History     Marital status:      Spouse name: Not on file     Number of children: Not on file     Years of education: Not on file     Highest education level: Not on file   Occupational History     Not on file   Tobacco Use     Smoking status: Every Day     Packs/day: 0.30     Years: 30.00     Pack years: 9.00     Types: Cigarettes     Passive exposure: Past     Smokeless tobacco: Never   Vaping Use     Vaping Use: Never used   Substance and Sexual Activity     Alcohol use: Not Currently     Drug use: No     Sexual activity: Not Currently   Other Topics Concern      Service Not Asked     Blood Transfusions Not Asked     Caffeine Concern No     Comment: 4-5 cans of pop daily     Occupational Exposure Not Asked     Hobby Hazards Not Asked     Sleep Concern Not Asked     Stress Concern Not Asked     Weight Concern Not Asked     Special Diet No     Back Care Not Asked     Exercise No     Comment: on hold     Bike Helmet Not Asked     Seat Belt Not Asked     Self-Exams Not Asked     Parent/sibling w/ CABG, MI or angioplasty before 65F 55M? Yes   Social History Narrative     Not on file     Social Determinants of Health     Financial Resource Strain: Not on file   Food Insecurity: Not on file   Transportation Needs: Not on file   Physical Activity: Not on file   Stress: Not on file   Social Connections: Not on file   Intimate Partner Violence: Not on file   Housing Stability: Not on file       Family History:  Family History   Problem Relation Age of Onset     Myocardial Infarction Mother      Hypertension Mother      Cerebrovascular Disease Mother        ROS:  The remainder of the complete ROS was negative unless noted in the HPI.    Exam:  /78 (BP Location: Right arm)   Pulse 83   Temp 97.7  F (36.5  C) (Oral)   Resp 16   Wt 97.3 kg (214 lb 6.4 oz)   LMP 01/01/2014 (Approximate)   SpO2 97%   BMI 37.98 kg/m    General: Alert, interactive, & in NAD  Resp: CTAB, no crackles or  wheezes  Cardiac: Regular rate; extremities warm;   Abdomen: Soft, nontender, nondistended. .  : Normal   Extremities: No LE edema or obvious joint abnormalities  Skin: Warm and dry, no jaundice or rash    Labs:  All laboratory data reviewed    Imaging: Left distal ureteral stone, not at the right ureteral orifice        Assessment and Plan: Swetha Patterson is a(n) 53 year old female with history of obstructing ureteral stone here for ureteroscopy and stent placement.  She does not note any significant change in her overall general health and has stable examination findings today.  She is okay to proceed ahead with the planned procedure.      Sammy Herndon MD  [facility]

## 2023-07-19 NOTE — PROGRESS NOTES
Assessment:   Swetha Patterson is a 53 year old female known to me from excision of a lipoma on her left shoulder in November 2021.  She returned approximately 1 year later with what felt like a recurrent lipoma in that area.  She had a surprising amount of pain associated with the area.  There was a subtle bulging, so she was taken to the operating room for exploration, but no mass was found.  She now has symmetrical pain in the right shoulder at the edge of the trapezius muscle.  The pain feels like a lump or a knot.  It is fairly persistently painful especially with movement of her muscles.  MRI si negative for a mass.    Plan:  History, exam, and imaging are not consistent with lipoma. With her symmetrical pain in bilateral trapezius muscles I suspect that she may have something like fibromyalgia or another muscular pain disorder.   I do not recommend surgery at this time.     Ibeth Conner MD    HPI:  Swetha Patterson is a 53 year old female known to me from excision of a lipoma on her left shoulder in November 2021.  She returned approximately 1 year later with what felt like a recurrent lipoma in that area.  She had a surprising amount of pain associated with the area.  There was a subtle bulging, so she was taken to the operating room for exploration, but no mass was found.  She now has symmetrical pain in the right shoulder at the edge of the trapezius muscle.  The pain feels like a lump or a knot.  It is fairly persistently painful especially with movement of her muscles or when pressed on by a strap, such as a purse or a bra strap.      Duration: 1 month  H/o trauma:  No, previous lipoma excision nearby approximately 1.5 year ago on the opposite shoulder  drainage:  No  Previous infections:  No  Other such masses now or in the past: Yes, had a lipoma excised from the left shoulder  Family h/o similar masses:  No  Pain:  Yes  Size:  slowly increasing    Past Medical History:   has a past medical history of  Benign essential hypertension, Bipolar disorder (H), Chronic abdominal pain, Chronic constipation, COPD (chronic obstructive pulmonary disease) (H), Hypothyroidism, Impaired fasting glucose, Kidney stone, Obesity (BMI 30-39.9), PONV (postoperative nausea and vomiting), Pre-diabetes, and Pure hypercholesterolemia.    Past Surgical History:  Past Surgical History:   Procedure Laterality Date     ANESTHESIA OUT OF OR MRI N/A 10/07/2021    Procedure: ANESTHESIA OUT OF OR MRI;  Surgeon: GENERIC ANESTHESIA PROVIDER;  Location:  OR     ANESTHESIA OUT OF OR MRI N/A 5/11/2023    Procedure: MRI OF NECK WITH AND WITHOUT CONTRAST;  Surgeon: GENERIC ANESTHESIA PROVIDER;  Location:  OR     ANESTHESIA OUT OF OR MRI Left 6/26/2023    Procedure: Anesthesia out of OR MRI;  Surgeon: GENERIC ANESTHESIA PROVIDER;  Location:  OR     APPENDECTOMY       ARTHROSCOPY SHOULDER DECOMPRESSION Left 10/21/2015    Procedure: ARTHROSCOPY SHOULDER DECOMPRESSION;  Surgeon: Julien Milian MD;  Location: [facility] OR     BIOPSY ARTERY TEMPORAL Left 03/11/2020    Procedure: LEFT TEMPORAL ARTERY BIOPSY;  Surgeon: Ibeth Conner MD;  Location: [facility] OR     BRONCHIAL THERMOPLASTY N/A 11/14/2014    Procedure: BRONCHIAL THERMOPLASTY;  Surgeon: Ward Whitaker MD;  [facility]: [facility]     BRONCHIAL THERMOPLASTY N/A 12/19/2014    Procedure: BRONCHIAL THERMOPLASTY;  Surgeon: Ward Whitaker MD;  [facility]: [facility]     BRONCHIAL THERMOPLASTY N/A 02/06/2015    Procedure: BRONCHIAL THERMOPLASTY;  Surgeon: Ward Whitaker MD;  [facility]     COLONOSCOPY N/A 11/26/2018    Procedure: COLONOSCOPY ([facility]);  Surgeon: Marshall Oakes MD;  Location:  OR     COLONOSCOPY N/A 10/22/2020    Procedure: Colonoscopy;  Surgeon: Marshall Mccormick MD;  Location:  OR     COLONOSCOPY N/A 01/20/2023    Procedure: COLONOSCOPY, FLEXIBLE, WITH LESION REMOVAL USING SNARE;  Surgeon: Carson Domínguez MD;  Location:  GI     DISCECTOMY, FUSION CERVICAL  ANTERIOR ONE LEVEL, COMBINED N/A 05/01/2018    Procedure: COMBINED DISCECTOMY, FUSION CERVICAL ANTERIOR ONE LEVEL;  1.  C5-C6 anterior cervical diskectomy and fusion.    2.  C5-C6 application of intervertebral biomechanical device for interbody fusion purposes.    3.  C5-C6 anterior instrumentation using the standard Kristin InViZia 24 mm plate with four associated bone screws, 12 mm in length.  Inferior screws are fixed.  Superior screws are va     ESOPHAGOSCOPY, GASTROSCOPY, DUODENOSCOPY (EGD), COMBINED N/A 10/22/2020    Procedure: Esophagoscopy, gastroscopy, duodenoscopy with biopsies;  Surgeon: Marshall Mccormick MD;  Location:  OR     ESOPHAGOSCOPY, GASTROSCOPY, DUODENOSCOPY (EGD), COMBINED N/A 06/17/2021    Procedure: ESOPHAGOGASTRODUODENOSCOPY, WITH BIOPSY;  Surgeon: Darrel Damon MD;  Location:  GI     EXCISE MASS NECK Left 02/01/2023    Procedure: exploration of  NECK left;  Surgeon: Ibeth Conner MD;  Location:  OR     EXCISE MASS TRUNK Left 11/03/2021    Procedure: EXCISION LEFT SHOULDER LIPOMA;  Surgeon: Ibeth Conner MD;  Location:  OR     EXCISE NODE MEDIASTINAL  04/26/2013    Procedure: EXCISE NODE MEDIASTINAL;;  Surgeon: Av Peña MD;  Location:  OR     LAPAROSCOPIC CHOLECYSTECTOMY N/A 10/19/2017    Procedure: LAPAROSCOPIC CHOLECYSTECTOMY;  LAPAROSCOPIC CHOLECYSTECTOMY;  Surgeon: Ney Jerry MD;  Location:  OR     LARYNGOSCOPY, EXCISE VOCAL CORD LESION, COMBINED       THORACOSCOPY  04/26/2013    Procedure: THORACOSCOPY;  LEFT VIDEO ASSISTED THORACOSCOPY, RESECTION OF POSTERIOR MEDIASTINAL MASS;  Surgeon: Av Peña MD;  Location:  OR     TONSILLECTOMY & ADENOIDECTOMY          Social History:  Social History     Socioeconomic History     Marital status:      Spouse name: Not on file     Number of children: Not on file     Years of education: Not on file     Highest education level: Not on file   Occupational History     Not on  "file   Tobacco Use     Smoking status: Every Day     Packs/day: 0.30     Years: 30.00     Pack years: 9.00     Types: Cigarettes     Passive exposure: Past     Smokeless tobacco: Never   Vaping Use     Vaping Use: Never used   Substance and Sexual Activity     Alcohol use: Not Currently     Drug use: No     Sexual activity: Not Currently   Other Topics Concern      Service Not Asked     Blood Transfusions Not Asked     Caffeine Concern No     Comment: 4-5 cans of pop daily     Occupational Exposure Not Asked     Hobby Hazards Not Asked     Sleep Concern Not Asked     Stress Concern Not Asked     Weight Concern Not Asked     Special Diet No     Back Care Not Asked     Exercise No     Comment: on hold     Bike Helmet Not Asked     Seat Belt Not Asked     Self-Exams Not Asked     Parent/sibling w/ CABG, MI or angioplasty before 65F 55M? Yes   Social History Narrative     Not on file     Social Determinants of Health     Financial Resource Strain: Not on file   Food Insecurity: Not on file   Transportation Needs: Not on file   Physical Activity: Not on file   Stress: Not on file   Social Connections: Not on file   Intimate Partner Violence: Not on file   Housing Stability: Not on file        Family History:  Family History   Problem Relation Age of Onset     Myocardial Infarction Mother      Hypertension Mother      Cerebrovascular Disease Mother      Family history reviewed and not pertinent.    ROS:  The 10 point review of systems is negative other than noted in the HPI and above.    PE:  /68   Pulse 80   Resp 15   Ht 1.6 m (5' 3\")   Wt 93.4 kg (206 lb)   LMP  (LMP Unknown)   SpO2 96%   BMI 36.49 kg/m    General appearance: well-developed, well-nourished, no apparent distress  HEENT:  Head normocephalic and atraumatic, pupils equal and round, conjunctivae clear, mucous membranes moist, external ears and nose normal  Lungs: respirations unlabored  Musculoskeletal:  Normal station and " gait  Extremities: warm, without edema  Neurologic: alert, speech is clear, moves all extremities with good strength  Psychiatric: Mood and affect are appropriate  Skin: There is a healed incision on the left shoulder.  There is no underlying subcutaneous mass of the left shoulder.  On the right shoulder, the patient reports tenderness along the anterior border of her trapezius muscle.  I do not feel a distinct mass here    Imaging:   MRI OF THE NECK WITHOUT AND WITH CONTRAST June 26, 2023 1:17 PM      HISTORY: Chronic left shoulder pain.     TECHNIQUE: Multiplanar, multisequence MRI of the neck. 10mL Gadavist      COMPARISON: None.      FINDINGS: The patient has a laryngeal mask airway in place for the  examination. This somewhat limits evaluation of the mucosal spaces of  the upper aerodigestive tract, particularly the base of  tongue/oropharynx, hypopharynx, and supraglottic larynx. Within that  limitation, the oral cavity/floor of mouth structures appear  unremarkable. Mild prominence of lingual tonsil tissue, likely  physiologic or reactive. Trace fluid noted in the supraglottic/glottic  larynx. No obvious mucosal space mass or inflammatory process.  Visualized aspects of the trachea are unremarkable.     Visualized intracranial contents appear normal. Trace fluid/membrane  thickening in the right mastoid tip. Visualized aspects of the   and paravertebral spaces are normal. The visualized aspects  of the parotid glands, submandibular glands, and thyroid gland appear  unremarkable. The expected carotid and vertebral artery flow voids  appear to be maintained. Scattered small cervical lymph nodes, likely  physiologic/reactive. No definite pathologic cervical lymphadenopathy.     Findings of C5-C6 ACF are noted. Mild to moderate scattered  degenerative changes of facet joints, most notable in the right  involving the upper to mid thoracic facet joints. Shallow multilevel  disc osteophyte complexes of the  cervical spine are noted contributing  to mild/mild to moderate multilevel spinal canal narrowing. No  high-grade spinal canal stenosis. No definite spinal cord signal  abnormality identified within the field of view. Varying degrees of  multilevel neural foraminal stenosis, in the setting of uncovertebral  spurs and facet arthropathy as well as disc osteophyte complexes. This  appears greatest at the C5-C6 and C6-C7 levels bilaterally. Cervical  spine findings are incompletely assessed on this study. No paraspinous  muscle edema or mass lesion identified. The musculature of the  visualized upper back/shoulders demonstrate no evidence for edema,  fluid collection, mass, or atrophy. There is mild levoconvex curvature  centered near the cervicothoracic junction. No abnormal enhancement is  identified.                                                                      IMPRESSION:  1. No definite concerning neck mass or pathologic cervical  lymphadenopathy.  2. No abnormal cervical paraspinous muscle edema, fluid collection, or  mass lesion.  3. Findings of previous C5-C6 ACDF. Degenerative changes of cervical  spine are partially visualized, as described.  4. Please see the body of the report for additional details.     NILES FELIPE MD   25 minutes total time spent on the date of this encounter doing: chart review, review of test results, patient visit, physical exam, education, counseling, developing plan of care, and documenting.    Ibeth Conner MD    Please route or send letter to:  Referring Provider

## 2023-07-19 NOTE — ANESTHESIA CARE TRANSFER NOTE
Patient: Swetha Patterson    Procedure: Procedure(s):  Cystoscopy, Right Ureteroscopy, Right Retrograde Pyelogram       Diagnosis: Ureteral stone [N20.1]  Diagnosis Additional Information: No value filed.    Anesthesia Type:   General     Note:    Oropharynx: oropharynx clear of all foreign objects  Level of Consciousness: drowsy  Oxygen Supplementation: face mask  Level of Supplemental Oxygen (L/min / FiO2): 6  Independent Airway: airway patency satisfactory and stable  Dentition: dentition unchanged  Vital Signs Stable: post-procedure vital signs reviewed and stable  Report to RN Given: handoff report given  Patient transferred to: PACU    Handoff Report: Identifed the Patient, Identified the Reponsible Provider, Reviewed the pertinent medical history, Discussed the surgical course, Reviewed Intra-OP anesthesia mangement and issues during anesthesia, Set expectations for post-procedure period and Allowed opportunity for questions and acknowledgement of understanding      Vitals:  Vitals Value Taken Time   /62 07/19/23 1215   Temp 98.7    Pulse 74 07/19/23 1217   Resp 33 07/19/23 1217   SpO2 97 % 07/19/23 1217   Vitals shown include unvalidated device data.    Electronically Signed By: LEIGH Self CRNA  July 19, 2023  12:19 PM

## 2023-07-20 ENCOUNTER — PATIENT OUTREACH (OUTPATIENT)
Dept: UROLOGY | Facility: CLINIC | Age: 53
End: 2023-07-20
Payer: COMMERCIAL

## 2023-07-20 DIAGNOSIS — R39.89 URINARY PROBLEM: Primary | ICD-10-CM

## 2023-07-20 RX ORDER — KETOROLAC TROMETHAMINE 10 MG/1
10 TABLET, FILM COATED ORAL EVERY 6 HOURS PRN
Qty: 10 TABLET | Refills: 0 | Status: SHIPPED | OUTPATIENT
Start: 2023-07-20 | End: 2023-11-15

## 2023-07-20 NOTE — TELEPHONE ENCOUNTER
Per Dr. Herndon  She could try Toradol and continue of flomax. Pt agreeable to plan, rx sent    Raisa RN  Care Coordinator  [phone]

## 2023-07-20 NOTE — TELEPHONE ENCOUNTER
Pt calls into clinic stating she has been rotating tylenol and ibuprofen since discharge, using warm baths, no significant improvement in discomfort. She states she was not able to find a comfortable position last night and was in so much discomfort, she has not been able to sleep. Pt states she voided at 330AM and urine was color of red wine. She did not see any clots in urine since so dark in color. Void this morning was more like cranberry juice in color. Pt states pain is in flank, and severe enough she is considering ED. Pt asking for advisement    Note routed to provider    NNEKA Kline  Care Coordinator  [phone]

## 2023-07-25 ENCOUNTER — TELEPHONE (OUTPATIENT)
Dept: INTERNAL MEDICINE | Facility: CLINIC | Age: 53
End: 2023-07-25

## 2023-07-25 NOTE — TELEPHONE ENCOUNTER
Fax received from [facility] - Missed Visit Notes for review and signature.  Put in Dr. Low's in basket.

## 2023-07-26 ENCOUNTER — TELEPHONE (OUTPATIENT)
Dept: UROLOGY | Facility: CLINIC | Age: 53
End: 2023-07-26

## 2023-07-26 DIAGNOSIS — N20.1 URETERAL STONE: Primary | ICD-10-CM

## 2023-07-26 NOTE — TELEPHONE ENCOUNTER
Request faxed to [facility] to send patient 2 24hour testing kits.  Mary Jones RN

## 2023-07-28 ENCOUNTER — TELEPHONE (OUTPATIENT)
Dept: INTERNAL MEDICINE | Facility: CLINIC | Age: 53
End: 2023-07-28

## 2023-07-28 NOTE — TELEPHONE ENCOUNTER
Patient calls, Dr. Low started her on Furosemide for leg swelling, but does not appear to be working as well over the last month. She states her toes look like sausages and she is starting to have swelling in her hands. She is drinking 5-6 12 oz of water each day. Patient has follow-up appointment with Dr. Low on 8/3 and asks if she could try an increased dose prior to her appointment.     Recommended Mychart evisit, but patient states that she does not have access to her Mychart right now due to her phone being hacked.     Brenda Franks RN  [facility]

## 2023-07-31 ENCOUNTER — TELEPHONE (OUTPATIENT)
Dept: INTERNAL MEDICINE | Facility: CLINIC | Age: 53
End: 2023-07-31

## 2023-07-31 NOTE — TELEPHONE ENCOUNTER
Left message on cell voicemail asking patient to return call to clinic.  Dr. Low can see patient or do video visit at 9:00 am today. Renee Chandra R.N.

## 2023-08-09 NOTE — TELEPHONE ENCOUNTER
Pt called and states she doesn't understand the directions for the litholink test kit. She would like a nurse to reach out to her and help go over what she should be doing. Please review and follow-up with patient.

## 2023-08-10 ENCOUNTER — TELEPHONE (OUTPATIENT)
Dept: UROLOGY | Facility: CLINIC | Age: 53
End: 2023-08-10

## 2023-08-10 DIAGNOSIS — R10.13 EPIGASTRIC PAIN: ICD-10-CM

## 2023-08-10 NOTE — TELEPHONE ENCOUNTER
Returned patient's call regarding Litholink 24hr urine questions.  Collection instructions were reviewed with patient with understanding.  Patient will plan on completing the two 24hr urine collections at the end of this month for her September follow up with Dr. Herndon as planned.

## 2023-08-10 NOTE — TELEPHONE ENCOUNTER
Pending Prescriptions:                       Disp   Refills    sucralfate (CARAFATE) 1 GM tablet [Pharma*120 ta*0            Sig: TAKE ONE TABLET BY MOUTH FOUR TIMES A DAY AS           NEEDED FOR NAUSEA    Patient has an appointment scheduled 8/11/23.    Please advise, thanks.

## 2023-08-11 ENCOUNTER — TELEPHONE (OUTPATIENT)
Dept: INTERNAL MEDICINE | Facility: CLINIC | Age: 53
End: 2023-08-11

## 2023-08-11 NOTE — TELEPHONE ENCOUNTER
[facility]  * missed visit report received via fax     Forms in  mail box for review and signature.

## 2023-08-12 DIAGNOSIS — E78.5 HYPERLIPIDEMIA LDL GOAL <130: Chronic | ICD-10-CM

## 2023-08-12 RX ORDER — SUCRALFATE 1 G/1
TABLET ORAL
Qty: 120 TABLET | Refills: 0 | Status: SHIPPED | OUTPATIENT
Start: 2023-08-12 | End: 2023-09-05

## 2023-08-14 RX ORDER — ATORVASTATIN CALCIUM 80 MG/1
TABLET, FILM COATED ORAL
Qty: 90 TABLET | Refills: 0 | Status: SHIPPED | OUTPATIENT
Start: 2023-08-14 | End: 2023-10-18

## 2023-08-14 NOTE — TELEPHONE ENCOUNTER
Medication refilled x 1 due to future appointment scheduled.    Appointments in Next Year      Sep 15, 2023  8:45 AM  (Arrive by 8:30 AM)  Return Visit with Ashley Antonio PA-C  [facility] ([facility] ) [phone]     Sep 26, 2023  4:00 PM  (Arrive by 3:45 PM)  US RENAL COMPLETE NON-VASCULAR with RHUS1  [facility] ([facility] ) [phone]     Sep 27, 2023  1:30 PM  (Arrive by 1:15 PM)  Return Kidney Stone with Sammy Herndon MD  [facility] [place] ([facility] ) [phone]     Oct 18, 2023  9:30 AM  (Arrive by 9:10 AM)  Adult Preventative Visit with Roro Chawla MD  [facility] ([facility] ) [phone]

## 2023-08-16 DIAGNOSIS — R06.02 SOB (SHORTNESS OF BREATH): ICD-10-CM

## 2023-08-16 DIAGNOSIS — R07.2 PRECORDIAL PAIN: ICD-10-CM

## 2023-08-16 RX ORDER — AMLODIPINE BESYLATE 2.5 MG/1
5 TABLET ORAL DAILY
Qty: 180 TABLET | Refills: 0 | OUTPATIENT
Start: 2023-08-16

## 2023-08-16 NOTE — TELEPHONE ENCOUNTER
[facility] Cardiology Refill Guideline reviewed.  Medication does not meet criteria for refill due to patient is due for an appointment. A refill letter was sent on 5/18/23.  Messaged to providers team for further review.

## 2023-08-16 NOTE — TELEPHONE ENCOUNTER
[facility] Cardiology Refill Guideline reviewed.  Medication does not meet criteria for refill due to appt needed.  Messaged to providers team for further review.     Providers team reached out via [employer] message to review steps needed for refill. Will wait for response. Sean Nicolas RN

## 2023-08-23 ENCOUNTER — TELEPHONE (OUTPATIENT)
Dept: CARDIOLOGY | Facility: CLINIC | Age: 53
End: 2023-08-23

## 2023-08-23 DIAGNOSIS — R00.2 PALPITATIONS: Primary | ICD-10-CM

## 2023-08-23 NOTE — TELEPHONE ENCOUNTER
[facility] Call Center    Phone Message    May a detailed message be left on voicemail: yes     Reason for Call: Other: Pt is having palpitations and her nurse said it would be good idea to be seen. Dr. Farris and Lesley Morales do not have any openings soon. Please call pt back to discuss. Thank you     Action Taken: Message routed to:  Other: Cardiology    Travel Screening: Not Applicable        Thank you!  [facility]

## 2023-08-23 NOTE — TELEPHONE ENCOUNTER
"Routing to Louise Morales,    Last seen Jan 2022. Patient was called to go over palpitation symptom listed below. Upon assessment, patient was told by her home healthcare RN that her heart \"skips a beat\" when taking her vitals. This caused patient to have anxiety and now feels the palpitations because it was pointed out to her.     Her vitals are within baseline and she does not have any other symptoms of note. She is due for a follow up and is scheduled for October. Prior to that, would you like any tests completed?     Per OV Jan 2022  Medical Hx: Morbid obesity, peripheral artery disease, COPD, emphysema, GERD.       Seen 1/2022 by Dr. Lin for atypical chest discomfort.  He recommended Lexiscan stress test which patient underwent 1/19/22, which showed normal myocardial perfusion.  LV function was normal, TID was absent.      Cardiac Medications  Amlodipine 5 mg daily  Lasix 20 mg BID  Lisinopril 20 mg daily    Please review and advise.     Sean Nicolas RN  "

## 2023-08-24 DIAGNOSIS — E11.9 TYPE 2 DIABETES MELLITUS WITHOUT COMPLICATION, WITHOUT LONG-TERM CURRENT USE OF INSULIN (H): ICD-10-CM

## 2023-08-25 NOTE — TELEPHONE ENCOUNTER
48 hour holter monitor ordered and patient called to go over recommendations. Scheduling messaged to complete. Sean Nicolas RN

## 2023-08-25 NOTE — TELEPHONE ENCOUNTER
Hi,   If she is having palpitations daily, a 48 Holter will be sufficient.   If not daily, then a 7 day Zio please.     Thanks,   Lesley Morales PA-C 8/25/2023 11:45 AM

## 2023-08-26 RX ORDER — ORAL SEMAGLUTIDE 14 MG/1
TABLET ORAL
Qty: 30 TABLET | Refills: 0 | Status: SHIPPED | OUTPATIENT
Start: 2023-08-26 | End: 2023-10-05

## 2023-08-28 ENCOUNTER — TELEPHONE (OUTPATIENT)
Dept: INTERNAL MEDICINE | Facility: CLINIC | Age: 53
End: 2023-08-28

## 2023-08-28 ENCOUNTER — MEDICAL CORRESPONDENCE (OUTPATIENT)
Dept: HEALTH INFORMATION MANAGEMENT | Facility: CLINIC | Age: 53
End: 2023-08-28

## 2023-08-28 NOTE — TELEPHONE ENCOUNTER
Fax received from [facility] 08/27/23 for review and signature.  Put in Dr. Low's in basket.

## 2023-08-28 NOTE — TELEPHONE ENCOUNTER
Fax received from [facility] 08/27/23 for review and signature.  Put in Dr. Low's in basket.

## 2023-08-29 ENCOUNTER — VIRTUAL VISIT (OUTPATIENT)
Dept: INTERNAL MEDICINE | Facility: CLINIC | Age: 53
End: 2023-08-29
Payer: COMMERCIAL

## 2023-08-29 DIAGNOSIS — R21 RASH AND NONSPECIFIC SKIN ERUPTION: ICD-10-CM

## 2023-08-29 DIAGNOSIS — I10 HTN, GOAL BELOW 140/90: ICD-10-CM

## 2023-08-29 DIAGNOSIS — R73.03 PREDIABETES: ICD-10-CM

## 2023-08-29 DIAGNOSIS — J44.1 COPD EXACERBATION (H): Primary | ICD-10-CM

## 2023-08-29 DIAGNOSIS — L30.9 ECZEMA, UNSPECIFIED TYPE: ICD-10-CM

## 2023-08-29 DIAGNOSIS — E03.9 HYPOTHYROIDISM, UNSPECIFIED TYPE: ICD-10-CM

## 2023-08-29 DIAGNOSIS — M79.7 FIBROMYALGIA: ICD-10-CM

## 2023-08-29 DIAGNOSIS — Z53.9 DIAGNOSIS NOT YET DEFINED: Primary | ICD-10-CM

## 2023-08-29 PROCEDURE — 99214 OFFICE O/P EST MOD 30 MIN: CPT | Mod: VID | Performed by: INTERNAL MEDICINE

## 2023-08-29 PROCEDURE — G0179 MD RECERTIFICATION HHA PT: HCPCS | Performed by: INTERNAL MEDICINE

## 2023-08-29 RX ORDER — LEVOTHYROXINE SODIUM 50 UG/1
50 TABLET ORAL DAILY
Qty: 90 TABLET | Refills: 0 | Status: SHIPPED | OUTPATIENT
Start: 2023-08-29 | End: 2023-12-11

## 2023-08-29 RX ORDER — NYSTATIN 100000 [USP'U]/G
POWDER TOPICAL 2 TIMES DAILY
Qty: 120 G | Refills: 1 | Status: SHIPPED | OUTPATIENT
Start: 2023-08-29 | End: 2023-11-15

## 2023-08-29 RX ORDER — AZITHROMYCIN 250 MG/1
TABLET, FILM COATED ORAL
Qty: 6 TABLET | Refills: 0 | Status: SHIPPED | OUTPATIENT
Start: 2023-08-29 | End: 2023-09-03

## 2023-08-29 RX ORDER — PREDNISONE 20 MG/1
40 TABLET ORAL DAILY
Qty: 10 TABLET | Refills: 0 | Status: SHIPPED | OUTPATIENT
Start: 2023-08-29 | End: 2023-12-11

## 2023-08-29 RX ORDER — LISINOPRIL 20 MG/1
20 TABLET ORAL DAILY
Qty: 90 TABLET | Refills: 0 | Status: SHIPPED | OUTPATIENT
Start: 2023-08-29 | End: 2023-10-10

## 2023-08-29 RX ORDER — HYDROCORTISONE 2.5 %
CREAM (GRAM) TOPICAL
Qty: 30 G | Refills: 1 | Status: SHIPPED | OUTPATIENT
Start: 2023-08-29 | End: 2024-02-26

## 2023-08-29 RX ORDER — GABAPENTIN 100 MG/1
100 CAPSULE ORAL 3 TIMES DAILY
Qty: 90 CAPSULE | Refills: 4 | Status: SHIPPED | OUTPATIENT
Start: 2023-08-29 | End: 2024-04-09

## 2023-08-29 NOTE — PATIENT INSTRUCTIONS
Plan:  Prednisone 40 mgh daily for 5 days   2. Azithromycin 250 mg, take 2 tablets today, then 1 tablet a day for the next 4 days (  antibiotic)   3. Continue the other meds, same doses for now.  4. Please make a lab appointment for fasting labs    5. You need to schedule appointment with OBGYN for pap  6. You need to schedule appointment for annual wellness        For info about how to use My Chart: call   [employer] Help Line 1.677.344.2800   [employer] Log-on Page: [email or web address][email or web address].[facility]  MyChart [place] Page: [email or web address]

## 2023-08-29 NOTE — PROGRESS NOTES
Swetha is a 53 year old who is being evaluated via a billable video visit.      How would you like to obtain your AVS? Mail a copy  If the video visit is dropped, the invitation should be resent by: Text to cell phone: [phone]  Will anyone else be joining your video visit? No        This is a VIDEO ( using Doximity)  encounter with the patient.       Location of the provider : office   Location of the patient : home      07:36 --- 08:00             Dr Low's note      Patient's instructions / PLAN:                                                        Plan:  Prednisone 40 mgh daily for 5 days   2. Azithromycin 250 mg, take 2 tablets today, then 1 tablet a day for the next 4 days (  antibiotic)   3. Continue the other meds, same doses for now.  4. Please make a lab appointment for fasting labs    5. You need to schedule appointment with OBGYN for pap  6. You need to schedule appointment for annual wellness        ASSESSMENT & PLAN:                                                      (J44.1) COPD exacerbation (H)  (primary encounter diagnosis)  Comment:   Plan: predniSONE (DELTASONE) 20 MG tablet,         azithromycin (ZITHROMAX) 250 MG tablet,         Hemoglobin A1c, CK total, CBC with platelets,         Lipid panel reflex to direct LDL Fasting,         Comprehensive metabolic panel, Albumin Random         Urine Quantitative with Creat Ratio, TSH with         free T4 reflex            (M79.7) Fibromyalgia  Comment:   Plan: gabapentin (NEURONTIN) 100 MG capsule,         Hemoglobin A1c, CK total, CBC with platelets,         Lipid panel reflex to direct LDL Fasting,         Comprehensive metabolic panel, Albumin Random         Urine Quantitative with Creat Ratio, TSH with         free T4 reflex            (E03.9) Hypothyroidism, unspecified type  Comment: not sure if controlled or not   Plan: levothyroxine (SYNTHROID/LEVOTHROID) 50 MCG         tablet, Hemoglobin A1c, CK total, CBC with         platelets,  Lipid panel reflex to direct LDL         Fasting, Comprehensive metabolic panel, Albumin        Random Urine Quantitative with Creat Ratio, TSH        with free T4 reflex            (I10) HTN, goal below 140/90  Comment: Not controlled   Plan: lisinopril (ZESTRIL) 20 MG tablet, Hemoglobin         A1c, CK total, CBC with platelets, Lipid panel         reflex to direct LDL Fasting, Comprehensive         metabolic panel, Albumin Random Urine         Quantitative with Creat Ratio, TSH with free T4        reflex            (R73.03) Prediabetes  Comment:   Plan: metFORMIN (GLUCOPHAGE) 500 MG tablet,         Hemoglobin A1c, CK total, CBC with platelets,         Lipid panel reflex to direct LDL Fasting,         Comprehensive metabolic panel, Albumin Random         Urine Quantitative with Creat Ratio, TSH with         free T4 reflex            (L30.9) Eczema, unspecified type  Comment:   Plan: hydrocortisone 2.5 % cream, Hemoglobin A1c, CK         total, CBC with platelets, Lipid panel reflex         to direct LDL Fasting, Comprehensive metabolic         panel, Albumin Random Urine Quantitative with         Creat Ratio, TSH with free T4 reflex           (R21) Rash and nonspecific skin eruption  Comment:   Plan: nystatin (MYCOSTATIN) [id number] UNIT/GM external         powder                 Chief complaint:                                                      Follow up chronic medical problems      SUBJECTIVE:                                                    History of present illness:    COPD exacerbation  -- cough x 2 weeks   -- uses nebs frequently     Obesity  -- she states that Rybelsus stopped working     Skin rash under breast, groins     Subjective   Swetha is a 53 year old, presenting for the following health issues:  Patient is being seen for an medication check.      8/29/2023     7:05 AM   Additional Questions   Roomed by Shauna Ferrera       HPI     Diabetes Follow-up    How often are you checking your blood  sugar? Two times daily  Blood sugar testing frequency justification:  Uncontrolled diabetes  What time of day are you checking your blood sugars (select all that apply)?  Before and after meals  Have you had any blood sugars above 200?  No  Have you had any blood sugars below 70?  No  What symptoms do you notice when your blood sugar is low?  None  What concerns do you have today about your diabetes? None   Do you have any of these symptoms? (Select all that apply)            Hyperlipidemia Follow-Up    Are you regularly taking any medication or supplement to lower your cholesterol?   Yes- atorvastatin  Are you having muscle aches or other side effects that you think could be caused by your cholesterol lowering medication?  Yes- muscle aches    Hypertension Follow-up    Do you check your blood pressure regularly outside of the clinic? Yes   Are you following a low salt diet? Yes  Are your blood pressures ever more than 140 on the top number (systolic) OR more   than 90 on the bottom number (diastolic), for example 140/90? Yes    BP Readings from Last 2 Encounters:   07/19/23 110/55   07/11/23 122/68     Hemoglobin A1C (%)   Date Value   08/17/2022 5.5   01/24/2022 5.8 (H)   10/16/2020 5.6   02/20/2020 6.0 (H)     LDL Cholesterol Calculated   Date Value   08/17/2022      Comment:     Cannot estimate LDL when triglyceride exceeds 400 mg/dL   01/24/2022 80 mg/dL   02/20/2020 96 mg/dL   05/10/2019 176 mg/dL (H)     LDL Cholesterol Direct (mg/dL)   Date Value   08/17/2022 72     How many servings of fruits and vegetables do you eat daily?  0-1  On average, how many sweetened beverages do you drink each day (Examples: soda, juice, sweet tea, etc.  Do NOT count diet or artificially sweetened beverages)?   0  How many days per week do you exercise enough to make your heart beat faster? 3 or less  How many minutes a day do you exercise enough to make your heart beat faster? 9 or less  How many days per week do you miss  taking your medication? 0    Review of Systems:                                                      ROS: negative for fever, chills, cough, wheezes, chest pain, shortness of breath, vomiting, abdominal pain, leg swelling Pos for cough and SOB         OBJECTIVE:           An actual physical exam can't be done during phone visit   A limited exam can sometimes be performed by video visit   NAD      PMHx: reviewed  Past Medical History:   Diagnosis Date    Benign essential hypertension     Bipolar disorder (H)     Chronic abdominal pain     Chronic constipation     COPD (chronic obstructive pulmonary disease) (H)     Hypothyroidism     Impaired fasting glucose     Kidney stone     Obesity (BMI 30-39.9)     PONV (postoperative nausea and vomiting)     Pre-diabetes     Pure hypercholesterolemia       PSHx: reviewed  Past Surgical History:   Procedure Laterality Date    ANESTHESIA OUT OF OR MRI N/A 10/07/2021    Procedure: ANESTHESIA OUT OF OR MRI;  Surgeon: GENERIC ANESTHESIA PROVIDER;  Location: [facility]    ANESTHESIA OUT OF OR MRI N/A 5/11/2023    Procedure: MRI OF NECK WITH AND WITHOUT CONTRAST;  Surgeon: GENERIC ANESTHESIA PROVIDER;  Location: [facility]    ANESTHESIA OUT OF OR MRI Left 6/26/2023    Procedure: Anesthesia out of OR MRI;  Surgeon: GENERIC ANESTHESIA PROVIDER;  Location: [facility]    APPENDECTOMY      ARTHROSCOPY SHOULDER DECOMPRESSION Left 10/21/2015    Procedure: ARTHROSCOPY SHOULDER DECOMPRESSION;  Surgeon: Julien Milian MD;  Location: [facility] OR    BIOPSY ARTERY TEMPORAL Left 03/11/2020    Procedure: LEFT TEMPORAL ARTERY BIOPSY;  Surgeon: Ibeth Conner MD;  Location: [facility]    BRONCHIAL THERMOPLASTY N/A 11/14/2014    Procedure: BRONCHIAL THERMOPLASTY;  Surgeon: Ward Whitaker MD;  [facility]    BRONCHIAL THERMOPLASTY N/A 12/19/2014    Procedure: BRONCHIAL THERMOPLASTY;  Surgeon: Ward Whitaker MD;  [facility]    BRONCHIAL THERMOPLASTY N/A 02/06/2015    Procedure: BRONCHIAL  THERMOPLASTY;  Surgeon: Ward Whitaker MD;  [facility]    COLONOSCOPY N/A 11/26/2018    Procedure: COLONOSCOPY (MN);  Surgeon: Marshall Oakes MD;  Location:  OR    COLONOSCOPY N/A 10/22/2020    Procedure: Colonoscopy;  Surgeon: Marshall Mccormick MD;  Location:  OR    COLONOSCOPY N/A 01/20/2023    Procedure: COLONOSCOPY, FLEXIBLE, WITH LESION REMOVAL USING SNARE;  Surgeon: Carson Domínguez MD;  Location:  GI    COMBINED CYSTOSCOPY, RETROGRADES, URETEROSCOPY, LASER HOLMIUM LITHOTRIPSY URETER(S), INSERT STENT Right 7/19/2023    Procedure: Cystoscopy, Right Ureteroscopy, Right Retrograde Pyelogram;  Surgeon: Sammy Herndon MD;  [place] [facility]    DISCECTOMY, FUSION CERVICAL ANTERIOR ONE LEVEL, COMBINED N/A 05/01/2018    Procedure: COMBINED DISCECTOMY, FUSION CERVICAL ANTERIOR ONE LEVEL;  1.  C5-C6 anterior cervical diskectomy and fusion.    2.  C5-C6 application of intervertebral biomechanical device for interbody fusion purposes.    3.  C5-C6 anterior instrumentation using the standard Kristin InViZia 24 mm plate with four associated bone screws, 12 mm in length.  Inferior screws are fixed.  Superior screws are va    ESOPHAGOSCOPY, GASTROSCOPY, DUODENOSCOPY (EGD), COMBINED N/A 10/22/2020    Procedure: Esophagoscopy, gastroscopy, duodenoscopy with biopsies;  Surgeon: Marshall Mccormick MD;  Location:  OR    ESOPHAGOSCOPY, GASTROSCOPY, DUODENOSCOPY (EGD), COMBINED N/A 06/17/2021    Procedure: ESOPHAGOGASTRODUODENOSCOPY, WITH BIOPSY;  Surgeon: Darrel Damon MD;  Location:  GI    EXCISE MASS NECK Left 02/01/2023    Procedure: exploration of  NECK left;  Surgeon: Ibeth Conner MD;  Location:  OR    EXCISE MASS TRUNK Left 11/03/2021    Procedure: EXCISION LEFT SHOULDER LIPOMA;  Surgeon: Ibeth Conner MD;  Location:  OR    EXCISE NODE MEDIASTINAL  04/26/2013    Procedure: EXCISE NODE MEDIASTINAL;;  Surgeon: Av Peña MD;  Location:  OR     LAPAROSCOPIC CHOLECYSTECTOMY N/A 10/19/2017    Procedure: LAPAROSCOPIC CHOLECYSTECTOMY;  LAPAROSCOPIC CHOLECYSTECTOMY;  Surgeon: Ney Jerry MD;  Location: [facility]    LARYNGOSCOPY, EXCISE VOCAL CORD LESION, COMBINED      THORACOSCOPY  04/26/2013    Procedure: THORACOSCOPY;  LEFT VIDEO ASSISTED THORACOSCOPY, RESECTION OF POSTERIOR MEDIASTINAL MASS;  Surgeon: Av Peña MD;  Location: [facility] OR    TONSILLECTOMY & ADENOIDECTOMY          Meds: reviewed  Current Outpatient Medications   Medication Sig Dispense Refill    acetaminophen (TYLENOL) 500 MG tablet Take 2 tablets (1,000 mg) by mouth every 6 hours as needed for pain      acetaminophen (TYLENOL) 650 MG CR tablet Take 1 tablet (650 mg) by mouth every 8 hours as needed for mild pain or fever 60 tablet 1    albuterol (PROAIR HFA/PROVENTIL HFA/VENTOLIN HFA) 108 (90 Base) MCG/ACT inhaler INHALE TWO PUFFS BY MOUTH EVERY 6 HOURS 18 g 0    albuterol (PROVENTIL) (2.5 MG/3ML) 0.083% neb solution INHALE ONE VIAL BY NEBULIZER EVERY 6 HOURS AS NEEDED FOR SHORTNESS OF BREATH OR WHEEZING Strength: (2.5 MG/3ML) 0.083% 75 mL 1    ALLERGY RELIEF CETIRIZINE 10 MG tablet TAKE ONE TABLET BY MOUTH EVERY DAY AS NEEDED 90 tablet 2    amLODIPine (NORVASC) 2.5 MG tablet Take 2 tablets (5 mg) by mouth daily Appointment required for further refills 180 tablet 0    atorvastatin (LIPITOR) 80 MG tablet TAKE ONE TABLET BY MOUTH EVERY DAY 90 tablet 0    benzoyl peroxide (ACNE-CLEAR) 10 % external gel Apply topically 2 times daily as needed 90 g 1    blood glucose (ACCU-CHEK ALLYSON PLUS) test strip USE TO TEST BLOOD SUGAR ONCE DAILY OR AS DIRECTED 100 strip 3    blood glucose (NO BRAND SPECIFIED) lancets standard Use to test blood sugar 1 times daily or as directed. 100 Lancet 0    blood glucose (NO BRAND SPECIFIED) lancets standard Use to test blood sugar 1 times daily or as directed. 100 each 3    blood glucose (NO BRAND SPECIFIED) test strip Use to test blood sugar 1 times daily  or as directed.  Dispense Accu-chek Olinda Plus or per patient insurance 100 strip 3    blood glucose monitoring (NO BRAND SPECIFIED) meter device kit Use to test blood sugar 1 times daily or as directed.  Dispense Accu-chek Olinda Plus or per insurance preference 1 kit 0    budesonide-formoterol (SYMBICORT) 160-4.5 MCG/ACT Inhaler Inhale 2 puffs into the lungs 2 times daily 30.6 g 1    buPROPion (WELLBUTRIN XL) 150 MG 24 hr tablet TAKE ONE TABLET BY MOUTH EVERY MORNING 90 tablet 1    cefdinir (OMNICEF) 300 MG capsule Take 1 capsule (300 mg) by mouth 2 times daily 10 capsule 0    clindamycin (CLINDAMAX) 1 % external gel Apply topically 2 times daily 60 g 3    diazepam (VALIUM) 10 MG tablet Take 1 tablet by mouth 2 times daily      dimenhyDRINATE (DRAMAMINE) 50 MG tablet Take 1 tablet (50 mg) by mouth nightly as needed for sleep 14 tablet 0    doxycycline hyclate (VIBRAMYCIN) 100 MG capsule Take 1 capsule (100 mg) by mouth 2 times daily 20 capsule 0    Emollient (CERAVE MOISTURIZING) CREA Externally apply 1 Application topically 2 times daily 453 g 11    furosemide (LASIX) 20 MG tablet TAKE ONE TABLET BY MOUTH TWICE A  tablet 3    gabapentin (NEURONTIN) 100 MG capsule Take 1 capsule (100 mg) by mouth 3 times daily 90 capsule 4    hydrocortisone 2.5 % cream APPLY TO THE AFFECTED AREA(S) TWO TIMES A DAY 30 g 1    hydrOXYzine (ATARAX) 50 MG tablet TAKE TWO TABLETS BY MOUTH THREE TIMES A DAY AS NEEDED FOR ANXIETY 70 tablet 9    ibuprofen (ADVIL/MOTRIN) 200 MG tablet Take 3 tablets (600 mg) by mouth every 6 hours as needed for moderate pain (4-6)      ibuprofen (ADVIL/MOTRIN) 400 MG tablet Take 1 tablet (400 mg) by mouth every 6 hours as needed for moderate pain 60 tablet 1    ketorolac (TORADOL) 10 MG tablet Take 1 tablet (10 mg) by mouth every 6 hours as needed for moderate pain 10 tablet 0    lamoTRIgine (LAMICTAL) 150 MG tablet Take 150 mg by mouth 2 times daily      levothyroxine (SYNTHROID/LEVOTHROID) 50 MCG  tablet TAKE ONE TABLET BY MOUTH EVERY DAY 90 tablet 0    lisinopril (ZESTRIL) 20 MG tablet TAKE ONE TABLET BY MOUTH EVERY DAY 90 tablet 0    metFORMIN (GLUCOPHAGE) 500 MG tablet TAKE ONE TABLET BY MOUTH EVERY DAY WITH BREAKFAST 90 tablet 0    minoxidil (ROGAINE) 5 % external solution apply per package directions to the scalp once daily 120 mL 3    montelukast (SINGULAIR) 10 MG tablet TAKE ONE TABLET BY MOUTH AT BEDTIME 90 tablet 1    multivitamin, therapeutic (THERA-VIT) TABS tablet Take 1 tablet by mouth daily      naloxone (NARCAN) 4 MG/0.1ML nasal spray Spray 1 spray (4 mg) into one nostril alternating nostrils once as needed for opioid reversal every 2-3 minutes until assistance arrives 0.2 mL 0    nitroGLYcerin (NITROSTAT) 0.4 MG sublingual tablet PLACE ONE TABLET UNDER THE TONGUE AT THE 1ST SIGN OF ATTACK. IF PAIN IS UNRELIEVED OR WORSENED 5 MINUTES AFTER 1ST DOSE, PROMPT MEDICAL ASSISTANCE IS NEEDED. MAY REPEAT EVERY 5 MINUTES UNTIL PAIN IS RELIEVED. MAX OF THREE DOSES 25 tablet 11    nystatin (MYCOSTATIN) 038794 UNIT/GM external powder APPLY TOPICALLY TWICE A DAY AS NEEDED SKIN RASH 45 g 9    omeprazole (PRILOSEC) 20 MG DR capsule TAKE ONE CAPSULE BY MOUTH TWICE A  capsule 3    ondansetron (ZOFRAN ODT) 4 MG ODT tab Take 1 tablet (4 mg) by mouth every 8 hours as needed for nausea 4 tablet 0    ondansetron (ZOFRAN-ODT) 4 MG ODT tab Take 1 tablet (4 mg) by mouth every 6 hours as needed for nausea 12 tablet 1    oxyCODONE-acetaminophen (PERCOCET) 5-325 MG tablet Take 1 tablet by mouth every 6 hours as needed      permethrin (LICE TREATMENT CREME RINSE) 1 % external liquid Apply topically from the neck down, leave on overnight (approx. 8 hours) and repeat 1 weeks later. 120 mL 1    polyethylene glycol (MIRALAX) 17 GM/Dose powder DISSOLVE 17 GRAMS (1 CAPFUL) AND DRINK BY MOUTH ONCE DAILY Strength: 17 GM/SCOOP 510 g 1    Semaglutide (RYBELSUS) 14 MG tablet TAKE ONE TABLET BY MOUTH EVERY DAY 30 tablet 0     senna-docusate (SENOKOT-S/PERICOLACE) 8.6-50 MG tablet Take 1-2 tablets by mouth 2 times daily 30 tablet 0    sucralfate (CARAFATE) 1 GM tablet TAKE ONE TABLET BY MOUTH FOUR TIMES A DAY AS NEEDED FOR NAUSEA 120 tablet 0    sulfamethoxazole-trimethoprim (BACTRIM DS) 800-160 MG tablet Take 1 tablet by mouth 2 times daily Take one tablet twice daily. For additional refills, please schedule a follow-up appointment. 180 tablet 1    terbinafine (LAMISIL) 1 % external cream APPLY TO AFFECTED AREA(S) TWO TIMES A DAY 30 g 2    traZODone (DESYREL) 100 MG tablet 100 mg      triamcinolone (KENALOG) 0.025 % external ointment Apply topically 2 times daily 80 g 1    Vitamin D3 (CHOLECALCIFEROL) 125 MCG (5000 UT) tablet Take 1 tablet by mouth three times a week         Soc Hx: reviewed  Fam Hx: reviewed        Chart documentation was completed, in part, with [email or web address] voice-recognition software. Even though reviewed, some grammatical, spelling, and word errors may remain.    Roro Low MD  Internal Medicine

## 2023-09-01 ENCOUNTER — TELEPHONE (OUTPATIENT)
Dept: INTERNAL MEDICINE | Facility: CLINIC | Age: 53
End: 2023-09-01

## 2023-09-01 NOTE — TELEPHONE ENCOUNTER
Received a letter from [employer] stating Cherelle Barreto is the CC for this patient. Cherelle can be reached at [phone].

## 2023-09-04 DIAGNOSIS — E11.9 TYPE 2 DIABETES MELLITUS WITHOUT COMPLICATION, WITHOUT LONG-TERM CURRENT USE OF INSULIN (H): Primary | ICD-10-CM

## 2023-09-04 DIAGNOSIS — R10.13 EPIGASTRIC PAIN: ICD-10-CM

## 2023-09-05 RX ORDER — LANCETS
EACH MISCELLANEOUS
Qty: 100 EACH | Refills: 0 | Status: SHIPPED | OUTPATIENT
Start: 2023-09-05 | End: 2023-12-04

## 2023-09-05 RX ORDER — SUCRALFATE 1 G/1
TABLET ORAL
Qty: 120 TABLET | Refills: 0 | Status: SHIPPED | OUTPATIENT
Start: 2023-09-05 | End: 2023-10-04

## 2023-09-05 RX ORDER — BLOOD SUGAR DIAGNOSTIC
STRIP MISCELLANEOUS
Qty: 100 STRIP | Refills: 0 | Status: SHIPPED | OUTPATIENT
Start: 2023-09-05 | End: 2023-12-04

## 2023-09-06 ENCOUNTER — HOSPITAL ENCOUNTER (OUTPATIENT)
Dept: CARDIOLOGY | Facility: CLINIC | Age: 53
Discharge: HOME | End: 2023-09-06
Attending: PHYSICIAN ASSISTANT | Admitting: PHYSICIAN ASSISTANT
Payer: COMMERCIAL

## 2023-09-06 DIAGNOSIS — R00.2 PALPITATIONS: ICD-10-CM

## 2023-09-06 PROCEDURE — 93225 XTRNL ECG REC<48 HRS REC: CPT

## 2023-09-06 PROCEDURE — 93227 XTRNL ECG REC<48 HR R&I: CPT | Performed by: INTERNAL MEDICINE

## 2023-09-06 PROCEDURE — 93226 XTRNL ECG REC<48 HR SCAN A/R: CPT

## 2023-09-11 ENCOUNTER — TELEPHONE (OUTPATIENT)
Dept: CARDIOLOGY | Facility: CLINIC | Age: 53
End: 2023-09-11

## 2023-09-11 ENCOUNTER — TELEPHONE (OUTPATIENT)
Dept: UROLOGY | Facility: CLINIC | Age: 53
End: 2023-09-11

## 2023-09-11 NOTE — TELEPHONE ENCOUNTER
Returned patient's call and 24hr urine collection instructions were reviewed with patient.  She will be begin her collection tomorrow morning.

## 2023-09-11 NOTE — TELEPHONE ENCOUNTER
[facility] Call Center    Phone Message    May a detailed message be left on voicemail: no     Reason for Call: Other: Patient is calling due to having some questions about what to do for her 24 hour urine sample. Please call patient back to discuss.     Action Taken: Other: [facility].    Travel Screening: Not Applicable

## 2023-09-14 ENCOUNTER — TELEPHONE (OUTPATIENT)
Dept: CARDIOLOGY | Facility: CLINIC | Age: 53
End: 2023-09-14

## 2023-09-14 DIAGNOSIS — R00.2 PALPITATIONS: Primary | ICD-10-CM

## 2023-09-14 DIAGNOSIS — I49.3 PVC'S (PREMATURE VENTRICULAR CONTRACTIONS): ICD-10-CM

## 2023-09-14 RX ORDER — METOPROLOL TARTRATE 25 MG/1
25 TABLET, FILM COATED ORAL 2 TIMES DAILY
Qty: 60 TABLET | Refills: 3 | Status: SHIPPED | OUTPATIENT
Start: 2023-09-14 | End: 2023-10-20

## 2023-09-15 ENCOUNTER — TELEPHONE (OUTPATIENT)
Dept: INTERNAL MEDICINE | Facility: CLINIC | Age: 53
End: 2023-09-15

## 2023-09-15 NOTE — TELEPHONE ENCOUNTER
Fax received from [facility] - Cancelled by Client, Services Refused 09/07/23 for review and signature.  Put in Dr. Low's in basket.

## 2023-09-15 NOTE — TELEPHONE ENCOUNTER
See result note on Holter Monitor 9/6/23. Patient scheduled for labs and echocardiogram.    Future Appointments   Date Time Provider Department Center   9/19/2023  7:45 AM [id number] SHCVCV CVIMG   9/19/2023 10:15 AM RU LAB [facility]   9/26/2023  4[facility]   9/27/2023  1:30 PM Sammy Herndon MD [facility]   10/10/2023  2:00 PM Lesley Morales, PA[facility]   10/18/2023  9:30 AM Roro Chawla MD [facility]   11/20/2023  2:00 PM Lis Talbert MD [facility]       Dayana RUANO RN  09/15/23 at 10:07 AM

## 2023-09-15 NOTE — TELEPHONE ENCOUNTER
Fax received from [facility] - Missed Visit/Cancelled per Agency due to Scheduling Error for review and signature.  Put in Dr. Low's in basket.

## 2023-09-19 ENCOUNTER — HOSPITAL ENCOUNTER (OUTPATIENT)
Dept: CARDIOLOGY | Facility: CLINIC | Age: 53
Discharge: HOME | End: 2023-09-19
Attending: PHYSICIAN ASSISTANT | Admitting: PHYSICIAN ASSISTANT
Payer: COMMERCIAL

## 2023-09-19 DIAGNOSIS — R00.2 PALPITATIONS: ICD-10-CM

## 2023-09-19 DIAGNOSIS — I49.3 PVC'S (PREMATURE VENTRICULAR CONTRACTIONS): ICD-10-CM

## 2023-09-19 LAB
BI-PLANE LVEF ECHO: NORMAL
LVEF ECHO: NORMAL

## 2023-09-19 PROCEDURE — 93306 TTE W/DOPPLER COMPLETE: CPT | Mod: 26 | Performed by: INTERNAL MEDICINE

## 2023-09-19 PROCEDURE — 999N000208 ECHOCARDIOGRAM COMPLETE

## 2023-09-19 PROCEDURE — 255N000002 HC RX 255 OP 636: Performed by: PHYSICIAN ASSISTANT

## 2023-09-19 RX ADMIN — HUMAN ALBUMIN MICROSPHERES AND PERFLUTREN 9 ML: 10; .22 INJECTION, SOLUTION INTRAVENOUS at 08:11

## 2023-09-21 ENCOUNTER — TELEPHONE (OUTPATIENT)
Dept: INTERNAL MEDICINE | Facility: CLINIC | Age: 53
End: 2023-09-21

## 2023-09-21 DIAGNOSIS — R06.02 SOB (SHORTNESS OF BREATH): ICD-10-CM

## 2023-09-21 DIAGNOSIS — R07.2 PRECORDIAL PAIN: ICD-10-CM

## 2023-09-21 RX ORDER — AMLODIPINE BESYLATE 2.5 MG/1
5 TABLET ORAL DAILY
Qty: 180 TABLET | Refills: 0 | Status: SHIPPED | OUTPATIENT
Start: 2023-09-21 | End: 2023-10-10

## 2023-09-22 ENCOUNTER — OFFICE VISIT (OUTPATIENT)
Dept: INTERNAL MEDICINE | Facility: CLINIC | Age: 53
End: 2023-09-22
Payer: COMMERCIAL

## 2023-09-22 ENCOUNTER — TELEPHONE (OUTPATIENT)
Dept: INTERNAL MEDICINE | Facility: CLINIC | Age: 53
End: 2023-09-22

## 2023-09-22 ENCOUNTER — ANCILLARY PROCEDURE (OUTPATIENT)
Dept: GENERAL RADIOLOGY | Facility: CLINIC | Age: 53
End: 2023-09-22
Payer: COMMERCIAL

## 2023-09-22 VITALS
TEMPERATURE: 97.7 F | WEIGHT: 215.4 LBS | HEART RATE: 75 BPM | SYSTOLIC BLOOD PRESSURE: 127 MMHG | HEIGHT: 63 IN | DIASTOLIC BLOOD PRESSURE: 80 MMHG | OXYGEN SATURATION: 97 % | BODY MASS INDEX: 38.16 KG/M2 | RESPIRATION RATE: 24 BRPM

## 2023-09-22 DIAGNOSIS — R05.1 ACUTE COUGH: Primary | ICD-10-CM

## 2023-09-22 DIAGNOSIS — J18.9 PNEUMONIA DUE TO INFECTIOUS ORGANISM, UNSPECIFIED LATERALITY, UNSPECIFIED PART OF LUNG: ICD-10-CM

## 2023-09-22 DIAGNOSIS — R05.1 ACUTE COUGH: ICD-10-CM

## 2023-09-22 PROCEDURE — 99213 OFFICE O/P EST LOW 20 MIN: CPT

## 2023-09-22 PROCEDURE — 71046 X-RAY EXAM CHEST 2 VIEWS: CPT | Mod: TC | Performed by: RADIOLOGY

## 2023-09-22 RX ORDER — DOXYCYCLINE HYCLATE 100 MG
100 TABLET ORAL 2 TIMES DAILY
Qty: 10 TABLET | Refills: 0 | Status: CANCELLED | OUTPATIENT
Start: 2023-09-22 | End: 2023-09-27

## 2023-09-22 ASSESSMENT — PAIN SCALES - GENERAL: PAINLEVEL: SEVERE PAIN (6)

## 2023-09-22 NOTE — PROGRESS NOTES
"  Assessment & Plan       (R05.1) Acute cough  Comment: Patient presents to the clinic with an acute cough.  She recently was treated with an antibiotic for a upper respiratory infection 3 weeks ago with azithromycin.  Patient states she got a little bit better and now got worse as of last night.  Patient has quite a bit of white phlegm coming up and extreme intense coughing that has caused her to vomit.  Will get chest x-ray and will treat for pneumonia if necessary patient is requesting Augmentin as it has worked well for her in the past.  Plan: XR Chest 2 Views, amoxicillin-clavulanate         (AUGMENTIN) 875-125 MG tablet        Augmentin sent in and will potentially use doxycycline in combination if positive for pneumonia.          20 minutes spent by me on the date of the encounter doing chart review, interpretation of tests, patient visit, and documentation        BMI:   Estimated body mass index is 38.16 kg/m  as calculated from the following:    Height as of this encounter: 1.6 m (5' 3\").    Weight as of this encounter: 97.7 kg (215 lb 6.4 oz).           Laney Maeck, APRN CNP  [facility] ANABEL Posada is a 53 year old, presenting for the following health issues: 3 weeks ago she saw PCP and was given azithromycin and it got a bit better but now its terrible again. No fevers. Positive cough. Coughing so hard she can throw up. She is drinking a lot of water and some juice. Appetite in general not great.   URI        9/22/2023     2:37 PM   Additional Questions   Roomed by Milena CHAVEZ   Accompanied by n/a       HPI       Acute Illness  Acute illness concerns: URI  Onset/Duration: x1 week  Symptoms:  Fever: No  Chills/Sweats: YES  Headache (location?): No  Sinus Pressure: YES  Conjunctivitis:  No  Ear Pain: no  Rhinorrhea: YES  Congestion: YES  Sore Throat: No  Cough: YES-productive of yellow sputum  Wheeze: YES  Decreased Appetite: No  Nausea: No  Vomiting: No  Diarrhea: " "No  Dysuria/Freq.: No  Dysuria or Hematuria: No  Fatigue/Achiness: YES  Sick/Strep Exposure: No  Therapies tried and outcome: inhalers, zpak x3 weeks ago        Review of Systems   Constitutional, HEENT, cardiovascular, pulmonary, gi and gu systems are negative, except as otherwise noted.      Objective    /80 (BP Location: Right arm, Cuff Size: Adult Regular)   Pulse 75   Temp 97.7  F (36.5  C) (Tympanic)   Resp 24   Ht 1.6 m (5' 3\")   Wt 97.7 kg (215 lb 6.4 oz)   LMP 01/01/2014 (Approximate)   SpO2 97%   BMI 38.16 kg/m    Body mass index is 38.16 kg/m .  Physical Exam   GENERAL: healthy, alert and no distress  EYES: Eyes grossly normal to inspection, PERRL and conjunctivae and sclerae normal  HENT: ear canals and TM's normal, nose and mouth without ulcers or lesions  NECK: no adenopathy, no asymmetry, masses, or scars and thyroid normal to palpation  RESP: wheezes and expiratory wheezes bilateral and throughout  CV: regular rate and rhythm, normal S1 S2, no S3 or S4, no murmur, click or rub, no peripheral edema and peripheral pulses strong  ABDOMEN: soft, nontender, no hepatosplenomegaly, no masses and bowel sounds normal  MS: no gross musculoskeletal defects noted, no edema  SKIN: no suspicious lesions or rashes  NEURO: Normal strength and tone, mentation intact and speech normal  PSYCH: mentation appears normal, affect normal/bright    CXR - Reviewed and interpreted by me Normal- no infiltrates, effusions, pneumothoraces, cardiomegaly or masses                  "

## 2023-09-22 NOTE — TELEPHONE ENCOUNTER
Fax received from [facility] - LPN visit cancelled by agency due to a scheduling error 09/14/23 for review and signature.  Put in Dr. Low's in basket.

## 2023-09-28 ENCOUNTER — TELEPHONE (OUTPATIENT)
Dept: INTERNAL MEDICINE | Facility: CLINIC | Age: 53
End: 2023-09-28

## 2023-09-28 NOTE — TELEPHONE ENCOUNTER
[facility] * special diet form received back from agency. It is asking for specific updates     Forms in [employer] mail box for review and signature.

## 2023-09-29 ENCOUNTER — HOSPITAL ENCOUNTER (OUTPATIENT)
Dept: ULTRASOUND IMAGING | Facility: CLINIC | Age: 53
Discharge: HOME | End: 2023-09-29
Attending: STUDENT IN AN ORGANIZED HEALTH CARE EDUCATION/TRAINING PROGRAM | Admitting: STUDENT IN AN ORGANIZED HEALTH CARE EDUCATION/TRAINING PROGRAM
Payer: COMMERCIAL

## 2023-09-29 DIAGNOSIS — N20.1 URETERAL STONE: ICD-10-CM

## 2023-09-29 PROCEDURE — 76770 US EXAM ABDO BACK WALL COMP: CPT

## 2023-10-04 ENCOUNTER — VIRTUAL VISIT (OUTPATIENT)
Dept: UROLOGY | Facility: CLINIC | Age: 53
End: 2023-10-04
Payer: COMMERCIAL

## 2023-10-04 VITALS — BODY MASS INDEX: 36.14 KG/M2 | WEIGHT: 204 LBS

## 2023-10-04 DIAGNOSIS — R10.13 EPIGASTRIC PAIN: ICD-10-CM

## 2023-10-04 DIAGNOSIS — N20.1 URETERAL STONE: Primary | ICD-10-CM

## 2023-10-04 DIAGNOSIS — E11.9 TYPE 2 DIABETES MELLITUS WITHOUT COMPLICATION, WITHOUT LONG-TERM CURRENT USE OF INSULIN (H): ICD-10-CM

## 2023-10-04 PROCEDURE — 99213 OFFICE O/P EST LOW 20 MIN: CPT | Mod: VID | Performed by: STUDENT IN AN ORGANIZED HEALTH CARE EDUCATION/TRAINING PROGRAM

## 2023-10-04 RX ORDER — SUCRALFATE 1 G/1
TABLET ORAL
Qty: 120 TABLET | Refills: 0 | Status: SHIPPED | OUTPATIENT
Start: 2023-10-04 | End: 2023-11-13

## 2023-10-04 ASSESSMENT — PAIN SCALES - GENERAL: PAINLEVEL: MODERATE PAIN (4)

## 2023-10-04 NOTE — PROGRESS NOTES
Virtual Visit Details    Type of service:  Video Visit   Video Start Time: 1:23 PM  Video End Time:1:32 PM    Originating Location (pt. Location): Home    Distant Location (provider location):  On-site  Platform used for Video Visit: Michael called her to be switched to a phone call due to connection issues as the patient was not able to connect to gokit after initial wait          Chief Complaint:    Kidney/Ureteral Stone             History of Present Illness:   Swetha Patterson is a 53 year old female being seen for follow-up of ureteral stone.  Duration of problem: Few months  Previous treatments: Ureteroscopy      Reviewed previous notes  She is here to discuss results of recent 24-hour urine testing and ultrasound  She is a first-time kidney stone follow-up             Past Medical History:     Past Medical History:   Diagnosis Date    Benign essential hypertension     Bipolar disorder (H)     Chronic abdominal pain     Chronic constipation     COPD (chronic obstructive pulmonary disease) (H)     Hypothyroidism     Impaired fasting glucose     Kidney stone     Obesity (BMI 30-39.9)     PONV (postoperative nausea and vomiting)     Pre-diabetes     Pure hypercholesterolemia             Past Surgical History:     Past Surgical History:   Procedure Laterality Date    ANESTHESIA OUT OF OR MRI N/A 10/07/2021    Procedure: ANESTHESIA OUT OF OR MRI;  Surgeon: GENERIC ANESTHESIA PROVIDER;  Location:  OR    ANESTHESIA OUT OF OR MRI N/A 5/11/2023    Procedure: MRI OF NECK WITH AND WITHOUT CONTRAST;  Surgeon: GENERIC ANESTHESIA PROVIDER;  Location:  OR    ANESTHESIA OUT OF OR MRI Left 6/26/2023    Procedure: Anesthesia out of OR MRI;  Surgeon: GENERIC ANESTHESIA PROVIDER;  Location:  OR    APPENDECTOMY      ARTHROSCOPY SHOULDER DECOMPRESSION Left 10/21/2015    Procedure: ARTHROSCOPY SHOULDER DECOMPRESSION;  Surgeon: Julien Milian MD;  Location:  OR    BIOPSY ARTERY TEMPORAL Left 03/11/2020    Procedure:  LEFT TEMPORAL ARTERY BIOPSY;  Surgeon: Ibeth Conner MD;  Location: [facility]    BRONCHIAL THERMOPLASTY N/A 11/14/2014    Procedure: BRONCHIAL THERMOPLASTY;  Surgeon: Ward Whitaker MD;  Location: [facility]    BRONCHIAL THERMOPLASTY N/A 12/19/2014    Procedure: BRONCHIAL THERMOPLASTY;  Surgeon: Ward Whitaker MD;  Location: [facility]    BRONCHIAL THERMOPLASTY N/A 02/06/2015    Procedure: BRONCHIAL THERMOPLASTY;  Surgeon: Ward Whitaker MD;  Location: [facility]    COLONOSCOPY N/A 11/26/2018    Procedure: COLONOSCOPY ([facility]);  Surgeon: Marshall Oakes MD;  Location:  OR    COLONOSCOPY N/A 10/22/2020    Procedure: Colonoscopy;  Surgeon: Marshall Mccormick MD;  Location: [facility] OR    COLONOSCOPY N/A 01/20/2023    Procedure: COLONOSCOPY, FLEXIBLE, WITH LESION REMOVAL USING SNARE;  Surgeon: Carson Domínguez MD;  Location:  GI    COMBINED CYSTOSCOPY, RETROGRADES, URETEROSCOPY, LASER HOLMIUM LITHOTRIPSY URETER(S), INSERT STENT Right 7/19/2023    Procedure: Cystoscopy, Right Ureteroscopy, Right Retrograde Pyelogram;  Surgeon: Sammy Herndon MD;  Location: [facility] OR    DISCECTOMY, FUSION CERVICAL ANTERIOR ONE LEVEL, COMBINED N/A 05/01/2018    Procedure: COMBINED DISCECTOMY, FUSION CERVICAL ANTERIOR ONE LEVEL;  1.  C5-C6 anterior cervical diskectomy and fusion.    2.  C5-C6 application of intervertebral biomechanical device for interbody fusion purposes.    3.  C5-C6 anterior instrumentation using the standard Kristin InViZia 24 mm plate with four associated bone screws, 12 mm in length.  Inferior screws are fixed.  Superior screws are va    ESOPHAGOSCOPY, GASTROSCOPY, DUODENOSCOPY (EGD), COMBINED N/A 10/22/2020    Procedure: Esophagoscopy, gastroscopy, duodenoscopy with biopsies;  Surgeon: Marshall Mccormick MD;  Location:  OR    ESOPHAGOSCOPY, GASTROSCOPY, DUODENOSCOPY (EGD), COMBINED N/A 06/17/2021    Procedure: ESOPHAGOGASTRODUODENOSCOPY, WITH BIOPSY;  Surgeon: Darrel Damon MD;   Location: [facility]    EXCISE MASS NECK Left 02/01/2023    Procedure: exploration of  NECK left;  Surgeon: bIeth oCnner MD;  Location: [facility] OR    EXCISE MASS TRUNK Left 11/03/2021    Procedure: EXCISION LEFT SHOULDER LIPOMA;  Surgeon: Ibeth Conner MD;  Location: [facility] OR    EXCISE NODE MEDIASTINAL  04/26/2013    Procedure: EXCISE NODE MEDIASTINAL;;  Surgeon: Av Peña MD;  Location: [facility] OR    LAPAROSCOPIC CHOLECYSTECTOMY N/A 10/19/2017    Procedure: LAPAROSCOPIC CHOLECYSTECTOMY;  LAPAROSCOPIC CHOLECYSTECTOMY;  Surgeon: Ney Jerry MD;  Location: [facility] OR    LARYNGOSCOPY, EXCISE VOCAL CORD LESION, COMBINED      THORACOSCOPY  04/26/2013    Procedure: THORACOSCOPY;  LEFT VIDEO ASSISTED THORACOSCOPY, RESECTION OF POSTERIOR MEDIASTINAL MASS;  Surgeon: Av Peña MD;  Location: [facility] OR    TONSILLECTOMY & ADENOIDECTOMY              Medications     Current Outpatient Medications   Medication    ACCU-CHEK GUIDE test strip    acetaminophen (TYLENOL) 500 MG tablet    acetaminophen (TYLENOL) 650 MG CR tablet    albuterol (PROAIR HFA/PROVENTIL HFA/VENTOLIN HFA) 108 (90 Base) MCG/ACT inhaler    albuterol (PROVENTIL) (2.5 MG/3ML) 0.083% neb solution    ALLERGY RELIEF CETIRIZINE 10 MG tablet    amLODIPine (NORVASC) 2.5 MG tablet    atorvastatin (LIPITOR) 80 MG tablet    benzoyl peroxide (ACNE-CLEAR) 10 % external gel    blood glucose (ACCU-CHEK ALLYSON PLUS) test strip    blood glucose (NO BRAND SPECIFIED) lancets standard    blood glucose (NO BRAND SPECIFIED) lancets standard    blood glucose (NO BRAND SPECIFIED) test strip    blood glucose monitoring (NO BRAND SPECIFIED) meter device kit    blood glucose monitoring (SOFTCLIX) lancets    budesonide-formoterol (SYMBICORT) 160-4.5 MCG/ACT Inhaler    buPROPion (WELLBUTRIN XL) 150 MG 24 hr tablet    clindamycin (CLINDAMAX) 1 % external gel    diazepam (VALIUM) 10 MG tablet    dimenhyDRINATE (DRAMAMINE) 50 MG tablet    Emollient (CERAVE MOISTURIZING)  CREA    furosemide (LASIX) 20 MG tablet    gabapentin (NEURONTIN) 100 MG capsule    hydrocortisone 2.5 % cream    hydrOXYzine (ATARAX) 50 MG tablet    ibuprofen (ADVIL/MOTRIN) 200 MG tablet    ibuprofen (ADVIL/MOTRIN) 400 MG tablet    ketorolac (TORADOL) 10 MG tablet    lamoTRIgine (LAMICTAL) 150 MG tablet    levothyroxine (SYNTHROID/LEVOTHROID) 50 MCG tablet    lisinopril (ZESTRIL) 20 MG tablet    metFORMIN (GLUCOPHAGE) 500 MG tablet    metoprolol tartrate (LOPRESSOR) 25 MG tablet    minoxidil (ROGAINE) 5 % external solution    montelukast (SINGULAIR) 10 MG tablet    multivitamin, therapeutic (THERA-VIT) TABS tablet    naloxone (NARCAN) 4 MG/0.1ML nasal spray    nitroGLYcerin (NITROSTAT) 0.4 MG sublingual tablet    nystatin (MYCOSTATIN) 349522 UNIT/GM external powder    nystatin (MYCOSTATIN) 262422 UNIT/GM external powder    omeprazole (PRILOSEC) 20 MG DR capsule    ondansetron (ZOFRAN ODT) 4 MG ODT tab    ondansetron (ZOFRAN-ODT) 4 MG ODT tab    oxyCODONE-acetaminophen (PERCOCET) 5-325 MG tablet    permethrin (LICE TREATMENT CREME RINSE) 1 % external liquid    polyethylene glycol (MIRALAX) 17 GM/Dose powder    Semaglutide (RYBELSUS) 14 MG tablet    senna-docusate (SENOKOT-S/PERICOLACE) 8.6-50 MG tablet    sucralfate (CARAFATE) 1 GM tablet    sulfamethoxazole-trimethoprim (BACTRIM DS) 800-160 MG tablet    terbinafine (LAMISIL) 1 % external cream    traZODone (DESYREL) 100 MG tablet    triamcinolone (KENALOG) 0.025 % external ointment    Vitamin D3 (CHOLECALCIFEROL) 125 MCG (5000 UT) tablet     No current facility-administered medications for this visit.            Family History:     Family History   Problem Relation Age of Onset    Myocardial Infarction Mother     Hypertension Mother     Cerebrovascular Disease Mother             Social History:     Social History     Socioeconomic History    Marital status:      Spouse name: Not on file    Number of children: Not on file    Years of education: Not on file     Highest education level: Not on file   Occupational History    Not on file   Tobacco Use    Smoking status: Every Day     Packs/day: 0.30     Years: 30.00     Pack years: 9.00     Types: Cigarettes     Passive exposure: Past    Smokeless tobacco: Never   Vaping Use    Vaping Use: Never used   Substance and Sexual Activity    Alcohol use: Not Currently    Drug use: No    Sexual activity: Not Currently   Other Topics Concern     Service Not Asked    Blood Transfusions Not Asked    Caffeine Concern No     Comment: 4-5 cans of pop daily    Occupational Exposure Not Asked    Hobby Hazards Not Asked    Sleep Concern Not Asked    Stress Concern Not Asked    Weight Concern Not Asked    Special Diet No    Back Care Not Asked    Exercise No     Comment: on hold    Bike Helmet Not Asked    Seat Belt Not Asked    Self-Exams Not Asked    Parent/sibling w/ CABG, MI or angioplasty before 65F 55M? Yes   Social History Narrative    Not on file     Social Determinants of Health     Financial Resource Strain: Not on file   Food Insecurity: Not on file   Transportation Needs: Not on file   Physical Activity: Not on file   Stress: Not on file   Social Connections: Not on file   Interpersonal Safety: Low Risk  (9/22/2023)    Interpersonal Safety     Do you feel physically and emotionally safe where you currently live?: Yes     Within the past 12 months, have you been hit, slapped, kicked or otherwise physically hurt by someone?: No     Within the past 12 months, have you been humiliated or emotionally abused in other ways by your partner or ex-partner?: No   Housing Stability: Not on file            Allergies:   Codeine, Cyclobenzaprine, and Hydrocodone         Review of Systems:  From intake questionnaire     Skin: negative  Eyes: negative  Ears/Nose/Throat: negative  Respiratory: No shortness of breath, dyspnea on exertion, cough, or hemoptysis  Cardiovascular: No chest pain or palpitations  Gastrointestinal: negative; no  nausea/vomiting, constipation or diarrhea  Genitourinary: as per HPI  Musculoskeletal: negative  Neurologic: negative  Psychiatric: negative  Hematologic/Lymphatic/Immunologic: negative  Endocrine: negative         Physical Exam:   This is a virtual phone visit      Review of Imaging:  The following imaging exams were independently viewed and interpreted by me and discussed with patient:  Renal/Bladder Ultrasound: Normal    Review of Labs:  The following labs were reviewed by me and discussed with the patient:  Litholink x2: Abnormal:  Low urine volume  Low urine citrate's and high urine pH    Assessment & Plan     Ureteral stone  We discussed about kidney stone preventive strategies and advised her to follow-up in 1 year  She will continue to increase fluid intake to ensure adequate urine output  There are some concern about low citrate and high urine pH but she is a first-time stone former and 50s are not very worried about renal tubular acidosis at this point  We will recommend repeat Litholink at 1 year interval follow-up along with imaging  - CT Abdomen Pelvis w/o Contrast [[id number]]; Future      Sammyjaime Herndon MD  [facility]      ==========================    Additional Billing and Coding Information:  Review of external notes as documented above   Review of the result(s) of each unique test - Litholink x2 ultrasound kidneys                10 minutes spent by me on the date of the encounter doing chart review, review of test results, interpretation of tests, patient visit, and documentation

## 2023-10-04 NOTE — PATIENT INSTRUCTIONS
Follow-up in 1 year with Litholink x1 and CT abdomen pelvis without contrast  Kidney stone preventive strategies discussed including increasing fluid intake  We will reassess for citrate levels and urine pH on follow-up evaluation

## 2023-10-04 NOTE — NURSING NOTE
Is the patient currently in the state of [place]? YES    Visit mode:VIDEO    If the visit is dropped, the patient can be reconnected by: VIDEO VISIT: Text to cell phone:   Telephone Information:   Mobile [phone]       Will anyone else be joining the visit? NO  (If patient encounters technical issues they should call [phone]    How would you like to obtain your AVS? MyChart    Are changes needed to the allergy or medication list? No, Pt stated no changes to allergies, and Pt stated no med changes    Reason for visit: JANNIE OROZCO

## 2023-10-05 RX ORDER — ORAL SEMAGLUTIDE 14 MG/1
TABLET ORAL
Qty: 30 TABLET | Refills: 0 | Status: SHIPPED | OUTPATIENT
Start: 2023-10-05 | End: 2023-11-13

## 2023-10-05 NOTE — TELEPHONE ENCOUNTER
Form received back for the second time. I have a call out to the  to ask what we are doing wrong with this.. Form is at my desk.

## 2023-10-10 ENCOUNTER — HOSPITAL ENCOUNTER (OUTPATIENT)
Dept: CARDIOLOGY | Facility: CLINIC | Age: 53
Discharge: HOME | End: 2023-10-10
Attending: PHYSICIAN ASSISTANT | Admitting: PHYSICIAN ASSISTANT
Payer: COMMERCIAL

## 2023-10-10 ENCOUNTER — OFFICE VISIT (OUTPATIENT)
Dept: CARDIOLOGY | Facility: CLINIC | Age: 53
End: 2023-10-10
Payer: COMMERCIAL

## 2023-10-10 ENCOUNTER — TELEPHONE (OUTPATIENT)
Dept: INTERNAL MEDICINE | Facility: CLINIC | Age: 53
End: 2023-10-10

## 2023-10-10 ENCOUNTER — LAB (OUTPATIENT)
Dept: LAB | Facility: CLINIC | Age: 53
End: 2023-10-10
Payer: COMMERCIAL

## 2023-10-10 VITALS
HEIGHT: 63 IN | HEART RATE: 75 BPM | BODY MASS INDEX: 38.89 KG/M2 | SYSTOLIC BLOOD PRESSURE: 92 MMHG | DIASTOLIC BLOOD PRESSURE: 60 MMHG | OXYGEN SATURATION: 93 % | WEIGHT: 219.5 LBS

## 2023-10-10 DIAGNOSIS — R06.02 SOB (SHORTNESS OF BREATH): ICD-10-CM

## 2023-10-10 DIAGNOSIS — I10 HTN, GOAL BELOW 140/90: ICD-10-CM

## 2023-10-10 DIAGNOSIS — R00.2 PALPITATIONS: ICD-10-CM

## 2023-10-10 DIAGNOSIS — I49.3 PVC'S (PREMATURE VENTRICULAR CONTRACTIONS): Primary | ICD-10-CM

## 2023-10-10 DIAGNOSIS — I49.3 PVC'S (PREMATURE VENTRICULAR CONTRACTIONS): ICD-10-CM

## 2023-10-10 DIAGNOSIS — R07.2 PRECORDIAL PAIN: ICD-10-CM

## 2023-10-10 LAB
ANION GAP SERPL CALCULATED.3IONS-SCNC: 13 MMOL/L (ref 7–15)
BUN SERPL-MCNC: 18.9 MG/DL (ref 6–20)
CALCIUM SERPL-MCNC: 9.1 MG/DL (ref 8.6–10)
CHLORIDE SERPL-SCNC: 104 MMOL/L (ref 98–107)
CREAT SERPL-MCNC: 1.33 MG/DL (ref 0.51–0.95)
DEPRECATED HCO3 PLAS-SCNC: 23 MMOL/L (ref 22–29)
EGFRCR SERPLBLD CKD-EPI 2021: 47 ML/MIN/1.73M2
GLUCOSE SERPL-MCNC: 128 MG/DL (ref 70–99)
MAGNESIUM SERPL-MCNC: 1.7 MG/DL (ref 1.7–2.3)
POTASSIUM SERPL-SCNC: 4.5 MMOL/L (ref 3.4–5.3)
SODIUM SERPL-SCNC: 140 MMOL/L (ref 135–145)
TSH SERPL DL<=0.005 MIU/L-ACNC: 1.02 UIU/ML (ref 0.3–4.2)

## 2023-10-10 PROCEDURE — 84443 ASSAY THYROID STIM HORMONE: CPT | Performed by: PHYSICIAN ASSISTANT

## 2023-10-10 PROCEDURE — 80048 BASIC METABOLIC PNL TOTAL CA: CPT | Performed by: PHYSICIAN ASSISTANT

## 2023-10-10 PROCEDURE — 99214 OFFICE O/P EST MOD 30 MIN: CPT | Performed by: PHYSICIAN ASSISTANT

## 2023-10-10 PROCEDURE — 36415 COLL VENOUS BLD VENIPUNCTURE: CPT | Performed by: PHYSICIAN ASSISTANT

## 2023-10-10 PROCEDURE — 93225 XTRNL ECG REC<48 HRS REC: CPT

## 2023-10-10 PROCEDURE — 93226 XTRNL ECG REC<48 HR SCAN A/R: CPT

## 2023-10-10 PROCEDURE — 93227 XTRNL ECG REC<48 HR R&I: CPT | Performed by: INTERNAL MEDICINE

## 2023-10-10 PROCEDURE — 83735 ASSAY OF MAGNESIUM: CPT | Performed by: PHYSICIAN ASSISTANT

## 2023-10-10 RX ORDER — AMLODIPINE BESYLATE 2.5 MG/1
2.5 TABLET ORAL DAILY
Qty: 90 TABLET | Refills: 3 | Status: SHIPPED | OUTPATIENT
Start: 2023-10-10 | End: 2023-10-20

## 2023-10-10 RX ORDER — LISINOPRIL 10 MG/1
10 TABLET ORAL DAILY
Qty: 90 TABLET | Refills: 3 | Status: SHIPPED | OUTPATIENT
Start: 2023-10-10

## 2023-10-10 NOTE — LETTER
10/10/2023    Willy Espana MD  [address] [facility] [address]    RE: Swetha Patterson       Dear Colleague,     I had the pleasure of seeing Swetha Patterson in the [facility].      CARDIOLOGY CLINIC PROGRESS NOTE    DOS: 10/10/2023      Swetha Patterson  : 1970, 53 year old  MRN: [id number]        Cardiologist:  Dr. Tate     History:  53-year-old female with morbid obesity, peripheral artery disease, COPD, emphysema, GERD, obesity.      Seen 2022 by Dr. Lin for atypical chest discomfort.  He recommended Lexiscan stress test which patient underwent 22, which showed normal myocardial perfusion.  LV function was normal, TID was absent.          23 Holter placed due to palpitations. Monitor Shows SR with frequent PVCs, 17% [place].     Started metoprolol 25 mg BID.     23 echo:  Interpretation Summary  Left ventricular systolic function is normal.  The visual ejection fraction is 55-60%.  No regional wall motion abnormalities noted.  The right ventricle is normal in structure, function and size.  No hemodynamically significant valvular abnormalities on 2D or color flow  imaging. Compared to prior study, there is no significant change        She presents today for follow up.   BMP, TSH, Mg were ordered but not done yet.   She is on metoprolol 25 mg BID.   She still has palpitations, potentially worse.   She can walk more since she has lost weight.   Some occasional sharp chest zings with some numbness in her fingers. This is not exertional.    BP is running lower today but not clearly symptomatic from this.   Kidney stones right and left. Plan is to increase hydration for now, if get too big, will attempt lithotripsy or stenting.   Smoking 5 cigarettes a day.            ROS:  Skin:  not assessed     Eyes:  not assessed    ENT:  not assessed    Respiratory:  Positive for shortness of breath;dyspnea on exertion;wheezing  Cardiovascular:     palpitations;Positive for;chest pain;edema;lightheadedness;dizziness  Gastroenterology: not assessed    Genitourinary:  not assessed    Musculoskeletal:  not assessed    Neurologic:  not assessed    Psychiatric:  not assessed    Heme/Lymph/Imm:  not assessed    Endocrine:  Positive for diabetes    PAST MEDICAL HISTORY:  Past Medical History:   Diagnosis Date    Benign essential hypertension     Bipolar disorder (H)     Chronic abdominal pain     Chronic constipation     COPD (chronic obstructive pulmonary disease) (H)     Hypothyroidism     Impaired fasting glucose     Kidney stone     Obesity (BMI 30-39.9)     PONV (postoperative nausea and vomiting)     Pre-diabetes     Pure hypercholesterolemia        PAST SURGICAL HISTORY:  Past Surgical History:   Procedure Laterality Date    ANESTHESIA OUT OF OR MRI N/A 10/07/2021    Procedure: ANESTHESIA OUT OF OR MRI;  Surgeon: GENERIC ANESTHESIA PROVIDER;  Location: [facility] OR    ANESTHESIA OUT OF OR MRI N/A 5/11/2023    Procedure: MRI OF NECK WITH AND WITHOUT CONTRAST;  Surgeon: GENERIC ANESTHESIA PROVIDER;  Location: [facility] OR    ANESTHESIA OUT OF OR MRI Left 6/26/2023    Procedure: Anesthesia out of OR MRI;  Surgeon: GENERIC ANESTHESIA PROVIDER;  Location: [facility] OR    APPENDECTOMY      ARTHROSCOPY SHOULDER DECOMPRESSION Left 10/21/2015    Procedure: ARTHROSCOPY SHOULDER DECOMPRESSION;  Surgeon: Julien Milian MD;  Location: [facility] OR    BIOPSY ARTERY TEMPORAL Left 03/11/2020    Procedure: LEFT TEMPORAL ARTERY BIOPSY;  Surgeon: Ibeth Conner MD;  Location: [facility] OR    BRONCHIAL THERMOPLASTY N/A 11/14/2014    Procedure: BRONCHIAL THERMOPLASTY;  Surgeon: Ward Whitaker MD;  [facility]: [facility]    BRONCHIAL THERMOPLASTY N/A 12/19/2014    Procedure: BRONCHIAL THERMOPLASTY;  Surgeon: Ward Whitaker MD;  [facility]    BRONCHIAL THERMOPLASTY N/A 02/06/2015    Procedure: BRONCHIAL THERMOPLASTY;  Surgeon: Ward Whitaker MD;  [facility]    COLONOSCOPY N/A  11/26/2018    Procedure: COLONOSCOPY ([facility]);  Surgeon: Marshall Oakes MD;  Location:  OR    COLONOSCOPY N/A 10/22/2020    Procedure: Colonoscopy;  Surgeon: Marshall Mccormick MD;  Location:  OR    COLONOSCOPY N/A 01/20/2023    Procedure: COLONOSCOPY, FLEXIBLE, WITH LESION REMOVAL USING SNARE;  Surgeon: Carson Domínguez MD;  Location:  GI    COMBINED CYSTOSCOPY, RETROGRADES, URETEROSCOPY, LASER HOLMIUM LITHOTRIPSY URETER(S), INSERT STENT Right 7/19/2023    Procedure: Cystoscopy, Right Ureteroscopy, Right Retrograde Pyelogram;  Surgeon: Sammy Herndon MD;  Location: [facility] OR    DISCECTOMY, FUSION CERVICAL ANTERIOR ONE LEVEL, COMBINED N/A 05/01/2018    Procedure: COMBINED DISCECTOMY, FUSION CERVICAL ANTERIOR ONE LEVEL;  1.  C5-C6 anterior cervical diskectomy and fusion.    2.  C5-C6 application of intervertebral biomechanical device for interbody fusion purposes.    3.  C5-C6 anterior instrumentation using the standard Kristin InViZia 24 mm plate with four associated bone screws, 12 mm in length.  Inferior screws are fixed.  Superior screws are va    ESOPHAGOSCOPY, GASTROSCOPY, DUODENOSCOPY (EGD), COMBINED N/A 10/22/2020    Procedure: Esophagoscopy, gastroscopy, duodenoscopy with biopsies;  Surgeon: Marshall Mccormick MD;  Location:  OR    ESOPHAGOSCOPY, GASTROSCOPY, DUODENOSCOPY (EGD), COMBINED N/A 06/17/2021    Procedure: ESOPHAGOGASTRODUODENOSCOPY, WITH BIOPSY;  Surgeon: Darrel Damon MD;  Location:  GI    EXCISE MASS NECK Left 02/01/2023    Procedure: exploration of  NECK left;  Surgeon: Ibeth Conner MD;  Location:  OR    EXCISE MASS TRUNK Left 11/03/2021    Procedure: EXCISION LEFT SHOULDER LIPOMA;  Surgeon: Ibeth Conner MD;  Location:  OR    EXCISE NODE MEDIASTINAL  04/26/2013    Procedure: EXCISE NODE MEDIASTINAL;;  Surgeon: Av Peña MD;  Location:  OR    LAPAROSCOPIC CHOLECYSTECTOMY N/A 10/19/2017    Procedure: LAPAROSCOPIC  CHOLECYSTECTOMY;  LAPAROSCOPIC CHOLECYSTECTOMY;  Surgeon: Ney Jerry MD;  Location: [facility] OR    LARYNGOSCOPY, EXCISE VOCAL CORD LESION, COMBINED      THORACOSCOPY  04/26/2013    Procedure: THORACOSCOPY;  LEFT VIDEO ASSISTED THORACOSCOPY, RESECTION OF POSTERIOR MEDIASTINAL MASS;  Surgeon: Av Peña MD;  Location: [facility] OR    TONSILLECTOMY & ADENOIDECTOMY         SOCIAL HISTORY:  Social History     Socioeconomic History    Marital status:      Spouse name: None    Number of children: None    Years of education: None    Highest education level: None   Occupational History    None   Tobacco Use    Smoking status: Former Smoker     Packs/day: 0.50     Years: 30.00     Pack years: 15.00     Types: Cigarettes     Start date: 1/1/1985    Smokeless tobacco: Former User     Quit date: 1/6/2022    Tobacco comment: smokes 1 cigarettes a day - in the process of trying to quit   Vaping Use    Vaping Use: Never used   Substance and Sexual Activity    Alcohol use: Never    Drug use: No    Sexual activity: Not Currently     Partners: Male     Birth control/protection: None   Other Topics Concern     Service Not Asked    Blood Transfusions Not Asked    Caffeine Concern No     Comment: 4-5 cans of pop daily    Occupational Exposure Not Asked    Hobby Hazards Not Asked    Sleep Concern Not Asked    Stress Concern Not Asked    Weight Concern Not Asked    Special Diet No    Back Care Not Asked    Exercise No     Comment: on hold    Bike Helmet Not Asked    Seat Belt Not Asked    Self-Exams Not Asked    Parent/sibling w/ CABG, MI or angioplasty before 65F 55M? Not Asked   Social History Narrative    None     Social Determinants of Health     Financial Resource Strain: Not on file   Food Insecurity: Not on file   Transportation Needs: Not on file   Physical Activity: Not on file   Stress: Not on file   Social Connections: Not on file   Intimate Partner Violence: Not on file   Housing Stability: Not on file        FAMILY HISTORY:  Family History   Problem Relation Age of Onset    Myocardial Infarction Mother     Hypertension Mother     Cerebrovascular Disease Mother        MEDS: ACCU-CHEK GUIDE test strip, USE TO TEST BLOOD SUGAR BLOOD SUGAR ONE TIME DAILY OR AS DIRECTED  acetaminophen (TYLENOL) 500 MG tablet, Take 2 tablets (1,000 mg) by mouth every 6 hours as needed for pain  acetaminophen (TYLENOL) 650 MG CR tablet, Take 1 tablet (650 mg) by mouth every 8 hours as needed for mild pain or fever  albuterol (PROAIR HFA/PROVENTIL HFA/VENTOLIN HFA) 108 (90 Base) MCG/ACT inhaler, INHALE TWO PUFFS BY MOUTH EVERY 6 HOURS  albuterol (PROVENTIL) (2.5 MG/3ML) 0.083% neb solution, INHALE ONE VIAL BY NEBULIZER EVERY 6 HOURS AS NEEDED FOR SHORTNESS OF BREATH OR WHEEZING Strength: (2.5 MG/3ML) 0.083%  ALLERGY RELIEF CETIRIZINE 10 MG tablet, TAKE ONE TABLET BY MOUTH EVERY DAY AS NEEDED  atorvastatin (LIPITOR) 80 MG tablet, TAKE ONE TABLET BY MOUTH EVERY DAY  benzoyl peroxide (ACNE-CLEAR) 10 % external gel, Apply topically 2 times daily as needed  blood glucose (ACCU-CHEK [place]YS[place] PLUS) test strip, USE TO TEST BLOOD SUGAR ONCE DAILY OR AS DIRECTED  blood glucose (NO BRAND SPECIFIED) lancets standard, Use to test blood sugar 1 times daily or as directed.  blood glucose (NO BRAND SPECIFIED) lancets standard, Use to test blood sugar 1 times daily or as directed.  blood glucose (NO BRAND SPECIFIED) test strip, Use to test blood sugar 1 times daily or as directed.  Dispense Accu-chek Allyson Plus or per patient insurance  blood glucose monitoring (NO BRAND SPECIFIED) meter device kit, Use to test blood sugar 1 times daily or as directed.  Dispense Accu-chek Allyson Plus or per insurance preference  blood glucose monitoring (SOFTCLIX) lancets, USE TO TEST BLOOD SUGAR ONCE DAILY OR AS DIRECTED  budesonide-formoterol (SYMBICORT) 160-4.5 MCG/ACT Inhaler, Inhale 2 puffs into the lungs 2 times daily  buPROPion (WELLBUTRIN XL) 150 MG 24 hr tablet, TAKE  ONE TABLET BY MOUTH EVERY MORNING  clindamycin (CLINDAMAX) 1 % external gel, Apply topically 2 times daily  diazepam (VALIUM) 10 MG tablet, Take 1 tablet by mouth 2 times daily  dimenhyDRINATE (DRAMAMINE) 50 MG tablet, Take 1 tablet (50 mg) by mouth nightly as needed for sleep  Emollient (CERAVE MOISTURIZING) CREA, Externally apply 1 Application topically 2 times daily  furosemide (LASIX) 20 MG tablet, TAKE ONE TABLET BY MOUTH TWICE A DAY  gabapentin (NEURONTIN) 100 MG capsule, Take 1 capsule (100 mg) by mouth 3 times daily  hydrocortisone 2.5 % cream, APPLY TO THE AFFECTED AREA(S) TWO TIMES A DAY  hydrOXYzine (ATARAX) 50 MG tablet, TAKE TWO TABLETS BY MOUTH THREE TIMES A DAY AS NEEDED FOR ANXIETY  ibuprofen (ADVIL/MOTRIN) 200 MG tablet, Take 3 tablets (600 mg) by mouth every 6 hours as needed for moderate pain (4-6)  ibuprofen (ADVIL/MOTRIN) 400 MG tablet, Take 1 tablet (400 mg) by mouth every 6 hours as needed for moderate pain  ketorolac (TORADOL) 10 MG tablet, Take 1 tablet (10 mg) by mouth every 6 hours as needed for moderate pain  lamoTRIgine (LAMICTAL) 150 MG tablet, Take 150 mg by mouth 2 times daily  levothyroxine (SYNTHROID/LEVOTHROID) 50 MCG tablet, Take 1 tablet (50 mcg) by mouth daily  metFORMIN (GLUCOPHAGE) 500 MG tablet, Take 1 tablet (500 mg) by mouth daily (with breakfast)  metoprolol tartrate (LOPRESSOR) 25 MG tablet, Take 1 tablet (25 mg) by mouth 2 times daily  minoxidil (ROGAINE) 5 % external solution, apply per package directions to the scalp once daily  montelukast (SINGULAIR) 10 MG tablet, TAKE ONE TABLET BY MOUTH AT BEDTIME  multivitamin, therapeutic (THERA-VIT) TABS tablet, Take 1 tablet by mouth daily  naloxone (NARCAN) 4 MG/0.1ML nasal spray, Spray 1 spray (4 mg) into one nostril alternating nostrils once as needed for opioid reversal every 2-3 minutes until assistance arrives  nitroGLYcerin (NITROSTAT) 0.4 MG sublingual tablet, PLACE ONE TABLET UNDER THE TONGUE AT THE 1ST SIGN OF ATTACK.  IF PAIN IS UNRELIEVED OR WORSENED 5 MINUTES AFTER 1ST DOSE, PROMPT MEDICAL ASSISTANCE IS NEEDED. MAY REPEAT EVERY 5 MINUTES UNTIL PAIN IS RELIEVED. MAX OF THREE DOSES  nystatin (MYCOSTATIN) 253210 UNIT/GM external powder, Apply topically 2 times daily  nystatin (MYCOSTATIN) 255961 UNIT/GM external powder, APPLY TOPICALLY TWICE A DAY AS NEEDED SKIN RASH  omeprazole (PRILOSEC) 20 MG DR capsule, TAKE ONE CAPSULE BY MOUTH TWICE A DAY  ondansetron (ZOFRAN ODT) 4 MG ODT tab, Take 1 tablet (4 mg) by mouth every 8 hours as needed for nausea  ondansetron (ZOFRAN-ODT) 4 MG ODT tab, Take 1 tablet (4 mg) by mouth every 6 hours as needed for nausea  oxyCODONE-acetaminophen (PERCOCET) 5-325 MG tablet, Take 1 tablet by mouth every 6 hours as needed  permethrin (LICE TREATMENT CREME RINSE) 1 % external liquid, Apply topically from the neck down, leave on overnight (approx. 8 hours) and repeat 1 weeks later.  polyethylene glycol (MIRALAX) 17 GM/Dose powder, DISSOLVE 17 GRAMS (1 CAPFUL) AND DRINK BY MOUTH ONCE DAILY Strength: 17 GM/SCOOP  RYBELSUS 14 MG tablet, TAKE ONE TABLET BY MOUTH EVERY DAY  senna-docusate (SENOKOT-S/PERICOLACE) 8.6-50 MG tablet, Take 1-2 tablets by mouth 2 times daily  sucralfate (CARAFATE) 1 GM tablet, TAKE ONE TABLET BY MOUTH FOUR TIMES A DAY AS NEEDED FOR NAUSEA  sulfamethoxazole-trimethoprim (BACTRIM DS) 800-160 MG tablet, Take 1 tablet by mouth 2 times daily Take one tablet twice daily. For additional refills, please schedule a follow-up appointment.  terbinafine (LAMISIL) 1 % external cream, APPLY TO AFFECTED AREA(S) TWO TIMES A DAY  traZODone (DESYREL) 100 MG tablet, 100 mg  triamcinolone (KENALOG) 0.025 % external ointment, Apply topically 2 times daily  Vitamin D3 (CHOLECALCIFEROL) 125 MCG (5000 UT) tablet, Take 1 tablet by mouth three times a week    No current facility-administered medications on file prior to visit.      ALLERGIES:   Allergies   Allergen Reactions    Codeine Nausea and Vomiting     "Cyclobenzaprine Nausea and Vomiting    Hydrocodone Nausea and Vomiting       PHYSICAL EXAM:  Vitals: BP 92/60 (BP Location: Right arm, Patient Position: Sitting, Cuff Size: Adult Regular)   Pulse 75   Ht 1.6 m (5' 3\")   Wt 99.6 kg (219 lb 8 oz)   LMP 2014 (Approximate)   SpO2 93%   BMI 38.88 kg/m    Constitutional:  cooperative;in no acute distress obese      Skin:  warm and dry to the touch;no apparent skin lesions or masses noted        Head:  normocephalic        Eyes:  pupils equal and round;conjunctivae and lids unremarkable;sclera white        ENT:  no pallor or cyanosis        Neck:  JVP normal        Respiratory:  clear to auscultation        Cardiac: regular rhythm;no murmurs, gallops or rubs detected;normal S1 and S2 frequent premature beats distant heart sounds              GI:  abdomen soft;BS normoactive obese      Vascular:                                        Extremities and Musculoskeletal:  no deformities, clubbing, cyanosis, erythema observed;no edema        Neurological:  no gross motor deficits;affect appropriate            LABS/DATA:  I reviewed the followin23 echo:  Interpretation Summary  Left ventricular systolic function is normal.  The visual ejection fraction is 55-60%.  No regional wall motion abnormalities noted.  The right ventricle is normal in structure, function and size.  No hemodynamically significant valvular abnormalities on 2D or color flow  imaging. Compared to prior study, there is no significant change        23 48 Hour Holter placed due to palpitations.   Monitor Shows SR with frequent PVCs, 17% [place].       Ella 22:  Result Text    The nuclear stress test is negative for inducible myocardial ischemia or infarction.    The left ventricular ejection fraction at rest is 68%.  The left ventricular ejection fraction at stress is greater than 70%.    Left ventricular function is hyperdynamic.    TID is absent.    A prior study was conducted on " 6/19/2019.  This study has no change when compared with the prior study.        Echo 7/30/21:  Interpretation Summary  The visual ejection fraction is 55-60%.  Regional wall motion abnormalities cannot be excluded due to limited visualization.  Contrast would enhance imaging of the apex. The study was technically Difficult.        ASSESSMENT/PLAN:  53-year-old female with morbid obesity, peripheral artery disease, COPD, emphysema, GERD, hypertension, obesity, PVCs.        Recently had palpitations. Holter shows 17% PVCs.   She underwent a stress test 1/2022 that showed no evidence of inducible ischemia with normal LV function.   Echo 9/19/23: normal LVEF, no sig valve disease.   Metoprolol started.     BMP, TSH, Mg were ordered earlier in the month but not yet done. We will get these today.     Repeat Holter on metoprolol.   Her BP is low today, so lowering lisinopril and amlodipine. Hopefully she will have more BP room if need to increase metoprolol in the future.   If still high PVC burden, would still consider having her see EP because even though EF is ok, she is quite bothered by the palpitations.        Follow up:  Labs today   Holter  Dr. Bebeto Morales PA-C      Thank you for allowing me to participate in the care of your patient.      Sincerely,     Lesley Morales PA-C     [facility]  cc:   No referring provider defined for this encounter.

## 2023-10-10 NOTE — TELEPHONE ENCOUNTER
I spoke to the  and she said the form is fine the way we sent it. Nothing else is needed at this time.

## 2023-10-10 NOTE — PATIENT INSTRUCTIONS
Your blood pressure is running a little lower.   Decrease lisinopril from 20 mg daily to 10 mg daily.   Decrease amlodipine from 5 mg to 2.5 mg daily.     For the extra/early beats you are having - we will get some labs on the way out today and also plan to repeat a heart monitor on the metoprolol. We will review the results and get back to you.

## 2023-10-10 NOTE — PROGRESS NOTES
CARDIOLOGY CLINIC PROGRESS NOTE    DOS: 10/10/2023      Swetha Patterson  : 1970, 53 year old  MRN: [id number]        Cardiologist:  Dr. Tate     History:  53-year-old female with morbid obesity, peripheral artery disease, COPD, emphysema, GERD, obesity.      Seen 2022 by Dr. Lin for atypical chest discomfort.  He recommended Lexiscan stress test which patient underwent 22, which showed normal myocardial perfusion.  LV function was normal, TID was absent.          23 Holter placed due to palpitations. Monitor Shows SR with frequent PVCs, 17% [place].     Started metoprolol 25 mg BID.     23 echo:  Interpretation Summary  Left ventricular systolic function is normal.  The visual ejection fraction is 55-60%.  No regional wall motion abnormalities noted.  The right ventricle is normal in structure, function and size.  No hemodynamically significant valvular abnormalities on 2D or color flow  imaging. Compared to prior study, there is no significant change        She presents today for follow up.   BMP, TSH, Mg were ordered but not done yet.   She is on metoprolol 25 mg BID.   She still has palpitations, potentially worse.   She can walk more since she has lost weight.   Some occasional sharp chest zings with some numbness in her fingers. This is not exertional.    BP is running lower today but not clearly symptomatic from this.   Kidney stones right and left. Plan is to increase hydration for now, if get too big, will attempt lithotripsy or stenting.   Smoking 5 cigarettes a day.            ROS:  Skin:  not assessed     Eyes:  not assessed    ENT:  not assessed    Respiratory:  Positive for shortness of breath;dyspnea on exertion;wheezing  Cardiovascular:    palpitations;Positive for;chest pain;edema;lightheadedness;dizziness  Gastroenterology: not assessed    Genitourinary:  not assessed    Musculoskeletal:  not assessed    Neurologic:  not assessed    Psychiatric:  not assessed     Heme/Lymph/Imm:  not assessed    Endocrine:  Positive for diabetes    PAST MEDICAL HISTORY:  Past Medical History:   Diagnosis Date    Benign essential hypertension     Bipolar disorder (H)     Chronic abdominal pain     Chronic constipation     COPD (chronic obstructive pulmonary disease) (H)     Hypothyroidism     Impaired fasting glucose     Kidney stone     Obesity (BMI 30-39.9)     PONV (postoperative nausea and vomiting)     Pre-diabetes     Pure hypercholesterolemia        PAST SURGICAL HISTORY:  Past Surgical History:   Procedure Laterality Date    ANESTHESIA OUT OF OR MRI N/A 10/07/2021    Procedure: ANESTHESIA OUT OF OR MRI;  Surgeon: GENERIC ANESTHESIA PROVIDER;  Location: [facility] OR    ANESTHESIA OUT OF OR MRI N/A 5/11/2023    Procedure: MRI OF NECK WITH AND WITHOUT CONTRAST;  Surgeon: GENERIC ANESTHESIA PROVIDER;  Location: [facility] OR    ANESTHESIA OUT OF OR MRI Left 6/26/2023    Procedure: Anesthesia out of OR MRI;  Surgeon: GENERIC ANESTHESIA PROVIDER;  Location: [facility] OR    APPENDECTOMY      ARTHROSCOPY SHOULDER DECOMPRESSION Left 10/21/2015    Procedure: ARTHROSCOPY SHOULDER DECOMPRESSION;  Surgeon: Julien Milian MD;  Location: [facility] OR    BIOPSY ARTERY TEMPORAL Left 03/11/2020    Procedure: LEFT TEMPORAL ARTERY BIOPSY;  Surgeon: Ibeth Conner MD;  Location: [facility] OR    BRONCHIAL THERMOPLASTY N/A 11/14/2014    Procedure: BRONCHIAL THERMOPLASTY;  Surgeon: Ward Whitaker MD;  Location: [facility]    BRONCHIAL THERMOPLASTY N/A 12/19/2014    Procedure: BRONCHIAL THERMOPLASTY;  Surgeon: Ward Whitaker MD;  [facility]: [facility]    BRONCHIAL THERMOPLASTY N/A 02/06/2015    Procedure: BRONCHIAL THERMOPLASTY;  Surgeon: Ward Whitaker MD;  [facility]: [facility]    COLONOSCOPY N/A 11/26/2018    Procedure: COLONOSCOPY ([facility]);  Surgeon: Marshall Oakes MD;  Location: [facility] OR    COLONOSCOPY N/A 10/22/2020    Procedure: Colonoscopy;  Surgeon: Marshall Mccormick MD;  Location: [facility] OR    COLONOSCOPY N/A  01/20/2023    Procedure: COLONOSCOPY, FLEXIBLE, WITH LESION REMOVAL USING SNARE;  Surgeon: Carson Domínguez MD;  Location:  GI    COMBINED CYSTOSCOPY, RETROGRADES, URETEROSCOPY, LASER HOLMIUM LITHOTRIPSY URETER(S), INSERT STENT Right 7/19/2023    Procedure: Cystoscopy, Right Ureteroscopy, Right Retrograde Pyelogram;  Surgeon: Sammy Herndon MD;  Location: [facility] OR    DISCECTOMY, FUSION CERVICAL ANTERIOR ONE LEVEL, COMBINED N/A 05/01/2018    Procedure: COMBINED DISCECTOMY, FUSION CERVICAL ANTERIOR ONE LEVEL;  1.  C5-C6 anterior cervical diskectomy and fusion.    2.  C5-C6 application of intervertebral biomechanical device for interbody fusion purposes.    3.  C5-C6 anterior instrumentation using the standard Kristin InViZia 24 mm plate with four associated bone screws, 12 mm in length.  Inferior screws are fixed.  Superior screws are va    ESOPHAGOSCOPY, GASTROSCOPY, DUODENOSCOPY (EGD), COMBINED N/A 10/22/2020    Procedure: Esophagoscopy, gastroscopy, duodenoscopy with biopsies;  Surgeon: Marshall Mccormick MD;  Location:  OR    ESOPHAGOSCOPY, GASTROSCOPY, DUODENOSCOPY (EGD), COMBINED N/A 06/17/2021    Procedure: ESOPHAGOGASTRODUODENOSCOPY, WITH BIOPSY;  Surgeon: Darrel Damon MD;  Location:  GI    EXCISE MASS NECK Left 02/01/2023    Procedure: exploration of  NECK left;  Surgeon: Ibeth Conner MD;  Location:  OR    EXCISE MASS TRUNK Left 11/03/2021    Procedure: EXCISION LEFT SHOULDER LIPOMA;  Surgeon: Ibeth Conner MD;  Location:  OR    EXCISE NODE MEDIASTINAL  04/26/2013    Procedure: EXCISE NODE MEDIASTINAL;;  Surgeon: Av Peña MD;  Location:  OR    LAPAROSCOPIC CHOLECYSTECTOMY N/A 10/19/2017    Procedure: LAPAROSCOPIC CHOLECYSTECTOMY;  LAPAROSCOPIC CHOLECYSTECTOMY;  Surgeon: Ney Jerry MD;  Location:  OR    LARYNGOSCOPY, EXCISE VOCAL CORD LESION, COMBINED      THORACOSCOPY  04/26/2013    Procedure: THORACOSCOPY;  LEFT VIDEO ASSISTED  THORACOSCOPY, RESECTION OF POSTERIOR MEDIASTINAL MASS;  Surgeon: Av Peña MD;  Location:  OR    TONSILLECTOMY & ADENOIDECTOMY         SOCIAL HISTORY:  Social History     Socioeconomic History    Marital status:      Spouse name: None    Number of children: None    Years of education: None    Highest education level: None   Occupational History    None   Tobacco Use    Smoking status: Former Smoker     Packs/day: 0.50     Years: 30.00     Pack years: 15.00     Types: Cigarettes     Start date: 1/1/1985    Smokeless tobacco: Former User     Quit date: 1/6/2022    Tobacco comment: smokes 1 cigarettes a day - in the process of trying to quit   Vaping Use    Vaping Use: Never used   Substance and Sexual Activity    Alcohol use: Never    Drug use: No    Sexual activity: Not Currently     Partners: Male     Birth control/protection: None   Other Topics Concern     Service Not Asked    Blood Transfusions Not Asked    Caffeine Concern No     Comment: 4-5 cans of pop daily    Occupational Exposure Not Asked    Hobby Hazards Not Asked    Sleep Concern Not Asked    Stress Concern Not Asked    Weight Concern Not Asked    Special Diet No    Back Care Not Asked    Exercise No     Comment: on hold    Bike Helmet Not Asked    Seat Belt Not Asked    Self-Exams Not Asked    Parent/sibling w/ CABG, MI or angioplasty before 65F 55M? Not Asked   Social History Narrative    None     Social Determinants of Health     Financial Resource Strain: Not on file   Food Insecurity: Not on file   Transportation Needs: Not on file   Physical Activity: Not on file   Stress: Not on file   Social Connections: Not on file   Intimate Partner Violence: Not on file   Housing Stability: Not on file       FAMILY HISTORY:  Family History   Problem Relation Age of Onset    Myocardial Infarction Mother     Hypertension Mother     Cerebrovascular Disease Mother        MEDS: ACCU-CHEK GUIDE test strip, USE TO TEST BLOOD SUGAR  BLOOD SUGAR ONE TIME DAILY OR AS DIRECTED  acetaminophen (TYLENOL) 500 MG tablet, Take 2 tablets (1,000 mg) by mouth every 6 hours as needed for pain  acetaminophen (TYLENOL) 650 MG CR tablet, Take 1 tablet (650 mg) by mouth every 8 hours as needed for mild pain or fever  albuterol (PROAIR HFA/PROVENTIL HFA/VENTOLIN HFA) 108 (90 Base) MCG/ACT inhaler, INHALE TWO PUFFS BY MOUTH EVERY 6 HOURS  albuterol (PROVENTIL) (2.5 MG/3ML) 0.083% neb solution, INHALE ONE VIAL BY NEBULIZER EVERY 6 HOURS AS NEEDED FOR SHORTNESS OF BREATH OR WHEEZING Strength: (2.5 MG/3ML) 0.083%  ALLERGY RELIEF CETIRIZINE 10 MG tablet, TAKE ONE TABLET BY MOUTH EVERY DAY AS NEEDED  atorvastatin (LIPITOR) 80 MG tablet, TAKE ONE TABLET BY MOUTH EVERY DAY  benzoyl peroxide (ACNE-CLEAR) 10 % external gel, Apply topically 2 times daily as needed  blood glucose (ACCU-CHEK [place] PLUS) test strip, USE TO TEST BLOOD SUGAR ONCE DAILY OR AS DIRECTED  blood glucose (NO BRAND SPECIFIED) lancets standard, Use to test blood sugar 1 times daily or as directed.  blood glucose (NO BRAND SPECIFIED) lancets standard, Use to test blood sugar 1 times daily or as directed.  blood glucose (NO BRAND SPECIFIED) test strip, Use to test blood sugar 1 times daily or as directed.  Dispense Accu-chek Allyson Plus or per patient insurance  blood glucose monitoring (NO BRAND SPECIFIED) meter device kit, Use to test blood sugar 1 times daily or as directed.  Dispense Accu-chek Allyson Plus or per insurance preference  blood glucose monitoring (SOFTCLIX) lancets, USE TO TEST BLOOD SUGAR ONCE DAILY OR AS DIRECTED  budesonide-formoterol (SYMBICORT) 160-4.5 MCG/ACT Inhaler, Inhale 2 puffs into the lungs 2 times daily  buPROPion (WELLBUTRIN XL) 150 MG 24 hr tablet, TAKE ONE TABLET BY MOUTH EVERY MORNING  clindamycin (CLINDAMAX) 1 % external gel, Apply topically 2 times daily  diazepam (VALIUM) 10 MG tablet, Take 1 tablet by mouth 2 times daily  dimenhyDRINATE (DRAMAMINE) 50 MG tablet, Take 1  tablet (50 mg) by mouth nightly as needed for sleep  Emollient (CERAVE MOISTURIZING) CREA, Externally apply 1 Application topically 2 times daily  furosemide (LASIX) 20 MG tablet, TAKE ONE TABLET BY MOUTH TWICE A DAY  gabapentin (NEURONTIN) 100 MG capsule, Take 1 capsule (100 mg) by mouth 3 times daily  hydrocortisone 2.5 % cream, APPLY TO THE AFFECTED AREA(S) TWO TIMES A DAY  hydrOXYzine (ATARAX) 50 MG tablet, TAKE TWO TABLETS BY MOUTH THREE TIMES A DAY AS NEEDED FOR ANXIETY  ibuprofen (ADVIL/MOTRIN) 200 MG tablet, Take 3 tablets (600 mg) by mouth every 6 hours as needed for moderate pain (4-6)  ibuprofen (ADVIL/MOTRIN) 400 MG tablet, Take 1 tablet (400 mg) by mouth every 6 hours as needed for moderate pain  ketorolac (TORADOL) 10 MG tablet, Take 1 tablet (10 mg) by mouth every 6 hours as needed for moderate pain  lamoTRIgine (LAMICTAL) 150 MG tablet, Take 150 mg by mouth 2 times daily  levothyroxine (SYNTHROID/LEVOTHROID) 50 MCG tablet, Take 1 tablet (50 mcg) by mouth daily  metFORMIN (GLUCOPHAGE) 500 MG tablet, Take 1 tablet (500 mg) by mouth daily (with breakfast)  metoprolol tartrate (LOPRESSOR) 25 MG tablet, Take 1 tablet (25 mg) by mouth 2 times daily  minoxidil (ROGAINE) 5 % external solution, apply per package directions to the scalp once daily  montelukast (SINGULAIR) 10 MG tablet, TAKE ONE TABLET BY MOUTH AT BEDTIME  multivitamin, therapeutic (THERA-VIT) TABS tablet, Take 1 tablet by mouth daily  naloxone (NARCAN) 4 MG/0.1ML nasal spray, Spray 1 spray (4 mg) into one nostril alternating nostrils once as needed for opioid reversal every 2-3 minutes until assistance arrives  nitroGLYcerin (NITROSTAT) 0.4 MG sublingual tablet, PLACE ONE TABLET UNDER THE TONGUE AT THE 1ST SIGN OF ATTACK. IF PAIN IS UNRELIEVED OR WORSENED 5 MINUTES AFTER 1ST DOSE, PROMPT MEDICAL ASSISTANCE IS NEEDED. MAY REPEAT EVERY 5 MINUTES UNTIL PAIN IS RELIEVED. MAX OF THREE DOSES  nystatin (MYCOSTATIN) 549188 UNIT/GM external powder,  "Apply topically 2 times daily  nystatin (MYCOSTATIN) 694303 UNIT/GM external powder, APPLY TOPICALLY TWICE A DAY AS NEEDED SKIN RASH  omeprazole (PRILOSEC) 20 MG DR capsule, TAKE ONE CAPSULE BY MOUTH TWICE A DAY  ondansetron (ZOFRAN ODT) 4 MG ODT tab, Take 1 tablet (4 mg) by mouth every 8 hours as needed for nausea  ondansetron (ZOFRAN-ODT) 4 MG ODT tab, Take 1 tablet (4 mg) by mouth every 6 hours as needed for nausea  oxyCODONE-acetaminophen (PERCOCET) 5-325 MG tablet, Take 1 tablet by mouth every 6 hours as needed  permethrin (LICE TREATMENT CREME RINSE) 1 % external liquid, Apply topically from the neck down, leave on overnight (approx. 8 hours) and repeat 1 weeks later.  polyethylene glycol (MIRALAX) 17 GM/Dose powder, DISSOLVE 17 GRAMS (1 CAPFUL) AND DRINK BY MOUTH ONCE DAILY Strength: 17 GM/SCOOP  RYBELSUS 14 MG tablet, TAKE ONE TABLET BY MOUTH EVERY DAY  senna-docusate (SENOKOT-S/PERICOLACE) 8.6-50 MG tablet, Take 1-2 tablets by mouth 2 times daily  sucralfate (CARAFATE) 1 GM tablet, TAKE ONE TABLET BY MOUTH FOUR TIMES A DAY AS NEEDED FOR NAUSEA  sulfamethoxazole-trimethoprim (BACTRIM DS) 800-160 MG tablet, Take 1 tablet by mouth 2 times daily Take one tablet twice daily. For additional refills, please schedule a follow-up appointment.  terbinafine (LAMISIL) 1 % external cream, APPLY TO AFFECTED AREA(S) TWO TIMES A DAY  traZODone (DESYREL) 100 MG tablet, 100 mg  triamcinolone (KENALOG) 0.025 % external ointment, Apply topically 2 times daily  Vitamin D3 (CHOLECALCIFEROL) 125 MCG (5000 UT) tablet, Take 1 tablet by mouth three times a week    No current facility-administered medications on file prior to visit.      ALLERGIES:   Allergies   Allergen Reactions    Codeine Nausea and Vomiting    Cyclobenzaprine Nausea and Vomiting    Hydrocodone Nausea and Vomiting       PHYSICAL EXAM:  Vitals: BP 92/60 (BP Location: Right arm, Patient Position: Sitting, Cuff Size: Adult Regular)   Pulse 75   Ht 1.6 m (5' 3\")   " Wt 99.6 kg (219 lb 8 oz)   LMP 2014 (Approximate)   SpO2 93%   BMI 38.88 kg/m    Constitutional:  cooperative;in no acute distress obese      Skin:  warm and dry to the touch;no apparent skin lesions or masses noted        Head:  normocephalic        Eyes:  pupils equal and round;conjunctivae and lids unremarkable;sclera white        ENT:  no pallor or cyanosis        Neck:  JVP normal        Respiratory:  clear to auscultation        Cardiac: regular rhythm;no murmurs, gallops or rubs detected;normal S1 and S2 frequent premature beats distant heart sounds              GI:  abdomen soft;BS normoactive obese      Vascular:                                        Extremities and Musculoskeletal:  no deformities, clubbing, cyanosis, erythema observed;no edema        Neurological:  no gross motor deficits;affect appropriate            LABS/DATA:  I reviewed the followin23 echo:  Interpretation Summary  Left ventricular systolic function is normal.  The visual ejection fraction is 55-60%.  No regional wall motion abnormalities noted.  The right ventricle is normal in structure, function and size.  No hemodynamically significant valvular abnormalities on 2D or color flow  imaging. Compared to prior study, there is no significant change        23 48 Hour Holter placed due to palpitations.   Monitor Shows SR with frequent PVCs, 17% [place].       Ella 22:  Result Text    The nuclear stress test is negative for inducible myocardial ischemia or infarction.    The left ventricular ejection fraction at rest is 68%.  The left ventricular ejection fraction at stress is greater than 70%.    Left ventricular function is hyperdynamic.    TID is absent.    A prior study was conducted on 2019.  This study has no change when compared with the prior study.        Echo 21:  Interpretation Summary  The visual ejection fraction is 55-60%.  Regional wall motion abnormalities cannot be excluded due to  limited visualization.  Contrast would enhance imaging of the apex. The study was technically Difficult.        ASSESSMENT/PLAN:  53-year-old female with morbid obesity, peripheral artery disease, COPD, emphysema, GERD, hypertension, obesity, PVCs.        Recently had palpitations. Holter shows 17% PVCs.   She underwent a stress test 1/2022 that showed no evidence of inducible ischemia with normal LV function.   Echo 9/19/23: normal LVEF, no sig valve disease.   Metoprolol started.     BMP, TSH, Mg were ordered earlier in the month but not yet done. We will get these today.     Repeat Holter on metoprolol.   Her BP is low today, so lowering lisinopril and amlodipine. Hopefully she will have more BP room if need to increase metoprolol in the future.   If still high PVC burden, would still consider having her see EP because even though EF is ok, she is quite bothered by the palpitations.        Follow up:  Labs today   Holter  Dr. Bebeto Morales PA-C

## 2023-10-12 ENCOUNTER — HOSPITAL ENCOUNTER (OUTPATIENT)
Dept: CT IMAGING | Facility: CLINIC | Age: 53
Discharge: HOME | End: 2023-10-12
Attending: INTERNAL MEDICINE
Payer: COMMERCIAL

## 2023-10-12 ENCOUNTER — HOSPITAL ENCOUNTER (OUTPATIENT)
Dept: ULTRASOUND IMAGING | Facility: CLINIC | Age: 53
Discharge: HOME | End: 2023-10-12
Attending: NURSE PRACTITIONER
Payer: COMMERCIAL

## 2023-10-12 DIAGNOSIS — R10.84 GENERALIZED ABDOMINAL PAIN: ICD-10-CM

## 2023-10-12 DIAGNOSIS — K59.00 CONSTIPATION, UNSPECIFIED CONSTIPATION TYPE: ICD-10-CM

## 2023-10-12 DIAGNOSIS — R74.8 ELEVATED ALKALINE PHOSPHATASE LEVEL: ICD-10-CM

## 2023-10-12 DIAGNOSIS — R10.13 EPIGASTRIC PAIN: ICD-10-CM

## 2023-10-12 PROCEDURE — 76700 US EXAM ABDOM COMPLETE: CPT

## 2023-10-12 PROCEDURE — 250N000009 HC RX 250: Performed by: INTERNAL MEDICINE

## 2023-10-12 PROCEDURE — 74177 CT ABD & PELVIS W/CONTRAST: CPT

## 2023-10-12 PROCEDURE — 250N000011 HC RX IP 250 OP 636: Performed by: INTERNAL MEDICINE

## 2023-10-12 PROCEDURE — 999N000128 HC STATISTIC PERIPHERAL IV START W/O US GUIDANCE

## 2023-10-12 RX ORDER — IOPAMIDOL 755 MG/ML
107 INJECTION, SOLUTION INTRAVASCULAR ONCE
Status: COMPLETED | OUTPATIENT
Start: 2023-10-12 | End: 2023-10-12

## 2023-10-12 RX ADMIN — IOPAMIDOL 107 ML: 755 INJECTION, SOLUTION INTRAVENOUS at 08:53

## 2023-10-12 RX ADMIN — SODIUM CHLORIDE 71 ML: 9 INJECTION, SOLUTION INTRAVENOUS at 08:53

## 2023-10-13 ENCOUNTER — TRANSFERRED RECORDS (OUTPATIENT)
Dept: HEALTH INFORMATION MANAGEMENT | Facility: CLINIC | Age: 53
End: 2023-10-13

## 2023-10-13 ASSESSMENT — ENCOUNTER SYMPTOMS
WEAKNESS: 1
ABDOMINAL PAIN: 1
DIARRHEA: 0
CHILLS: 0
FREQUENCY: 1
SORE THROAT: 0
JOINT SWELLING: 1
NAUSEA: 1
DIZZINESS: 1
HEMATURIA: 0
HEARTBURN: 0
MYALGIAS: 1
SHORTNESS OF BREATH: 1
EYE PAIN: 0
PARESTHESIAS: 0
NERVOUS/ANXIOUS: 1
PALPITATIONS: 1
CONSTIPATION: 0
HEMATOCHEZIA: 0
BREAST MASS: 0
ARTHRALGIAS: 1
COUGH: 1
HEADACHES: 1

## 2023-10-13 ASSESSMENT — ACTIVITIES OF DAILY LIVING (ADL)
CURRENT_FUNCTION: HOUSEWORK REQUIRES ASSISTANCE
CURRENT_FUNCTION: TRANSPORTATION REQUIRES ASSISTANCE
CURRENT_FUNCTION: LAUNDRY REQUIRES ASSISTANCE

## 2023-10-16 ENCOUNTER — TELEPHONE (OUTPATIENT)
Dept: INTERNAL MEDICINE | Facility: CLINIC | Age: 53
End: 2023-10-16

## 2023-10-16 NOTE — TELEPHONE ENCOUNTER
Fax received from [facility] - Missed Visit Note 10/12/23 for review and signature.  Put in Dr. Low's in basket.    '

## 2023-10-17 DIAGNOSIS — L73.2 HIDRADENITIS SUPPURATIVA: ICD-10-CM

## 2023-10-18 ENCOUNTER — OFFICE VISIT (OUTPATIENT)
Dept: INTERNAL MEDICINE | Facility: CLINIC | Age: 53
End: 2023-10-18
Payer: COMMERCIAL

## 2023-10-18 VITALS
SYSTOLIC BLOOD PRESSURE: 122 MMHG | DIASTOLIC BLOOD PRESSURE: 74 MMHG | BODY MASS INDEX: 38.06 KG/M2 | OXYGEN SATURATION: 97 % | HEART RATE: 92 BPM | RESPIRATION RATE: 16 BRPM | TEMPERATURE: 96.1 F | HEIGHT: 63 IN | WEIGHT: 214.8 LBS

## 2023-10-18 DIAGNOSIS — Z02.9 ADMINISTRATIVE ENCOUNTER: ICD-10-CM

## 2023-10-18 DIAGNOSIS — R73.9 BLOOD SUGAR INCREASED: ICD-10-CM

## 2023-10-18 DIAGNOSIS — E78.5 HYPERLIPIDEMIA LDL GOAL <130: Chronic | ICD-10-CM

## 2023-10-18 DIAGNOSIS — Z00.00 ROUTINE GENERAL MEDICAL EXAMINATION AT A HEALTH CARE FACILITY: Primary | ICD-10-CM

## 2023-10-18 DIAGNOSIS — J44.9 COPD, MODERATE (H): ICD-10-CM

## 2023-10-18 DIAGNOSIS — Z71.6 ENCOUNTER FOR SMOKING CESSATION COUNSELING: ICD-10-CM

## 2023-10-18 DIAGNOSIS — Z00.00 ENCOUNTER FOR MEDICARE ANNUAL WELLNESS EXAM: ICD-10-CM

## 2023-10-18 DIAGNOSIS — R73.03 PREDIABETES: ICD-10-CM

## 2023-10-18 PROCEDURE — 99396 PREV VISIT EST AGE 40-64: CPT | Performed by: INTERNAL MEDICINE

## 2023-10-18 PROCEDURE — 99214 OFFICE O/P EST MOD 30 MIN: CPT | Mod: 25 | Performed by: INTERNAL MEDICINE

## 2023-10-18 RX ORDER — ATORVASTATIN CALCIUM 80 MG/1
80 TABLET, FILM COATED ORAL DAILY
Qty: 90 TABLET | Refills: 1 | Status: SHIPPED | OUTPATIENT
Start: 2023-10-18 | End: 2024-05-02

## 2023-10-18 RX ORDER — ALBUTEROL SULFATE 90 UG/1
AEROSOL, METERED RESPIRATORY (INHALATION)
Qty: 18 G | Refills: 0 | Status: SHIPPED | OUTPATIENT
Start: 2023-10-18 | End: 2023-11-15

## 2023-10-18 RX ORDER — BUPROPION HYDROCHLORIDE 150 MG/1
TABLET ORAL
Qty: 90 TABLET | Refills: 0 | Status: SHIPPED | OUTPATIENT
Start: 2023-10-18 | End: 2024-01-12

## 2023-10-18 ASSESSMENT — ENCOUNTER SYMPTOMS
CHILLS: 0
JOINT SWELLING: 1
HEARTBURN: 0
HEMATURIA: 0
HEADACHES: 1
SHORTNESS OF BREATH: 1
CONSTIPATION: 0
ABDOMINAL PAIN: 1
WEAKNESS: 1
PALPITATIONS: 1
HEMATOCHEZIA: 0
BREAST MASS: 0
NAUSEA: 1
EYE PAIN: 0
DIZZINESS: 1
NERVOUS/ANXIOUS: 1
ARTHRALGIAS: 1
DIARRHEA: 0
FREQUENCY: 1
MYALGIAS: 1
SORE THROAT: 0
PARESTHESIAS: 0
COUGH: 1

## 2023-10-18 ASSESSMENT — PATIENT HEALTH QUESTIONNAIRE - PHQ9
10. IF YOU CHECKED OFF ANY PROBLEMS, HOW DIFFICULT HAVE THESE PROBLEMS MADE IT FOR YOU TO DO YOUR WORK, TAKE CARE OF THINGS AT HOME, OR GET ALONG WITH OTHER PEOPLE: SOMEWHAT DIFFICULT
SUM OF ALL RESPONSES TO PHQ QUESTIONS 1-9: 14
SUM OF ALL RESPONSES TO PHQ QUESTIONS 1-9: 14

## 2023-10-18 ASSESSMENT — PAIN SCALES - GENERAL: PAINLEVEL: NO PAIN (0)

## 2023-10-18 NOTE — PROGRESS NOTES
Dr Low's note    Patient's instructions / PLAN:                                                        Plan:  Forms to change the housing   2. No change in meds  3. The following vaccines are recommended for you.   -- flu  -- Covid  -- RSV -- because of COPD  -- Pneumonia 20  -- Tetanus vaccine  - Td  -- Shingrix vaccine - second dose        ASSESSMENT & PLAN:                                                      (Z00.00) Routine general medical examination at a health care facility  (primary encounter diagnosis)  Comment:   Plan:     (R73.09) Blood sugar increased  Comment: A1c has been below 6 in the past  Plan: Hemoglobin A1c, OFFICE/OUTPT VISIT,EST,LEVL IV            (E78.5) Hyperlipidemia LDL goal <130  Comment: Controlled  Plan: atorvastatin (LIPITOR) 80 MG tablet,         OFFICE/OUTPT VISIT,EST,LEVL IV            (J44.9) COPD, moderate (H)  Comment: Severe, stable  Plan: albuterol (PROAIR HFA/PROVENTIL HFA/VENTOLIN         HFA) 108 (90 Base) MCG/ACT inhaler,         OFFICE/OUTPT VISIT,EST,LEVL IV            (R73.03) Prediabetes  Comment:   Plan: metFORMIN (GLUCOPHAGE) 500 MG tablet,         OFFICE/OUTPT VISIT,EST,LEVL IV            (Z02.9) Administrative encounter  Comment:   Plan: OFFICE/OUTPT VISIT,EST,LEVL IV               Chief Complaint:                                                        Annual exam  Follow up chronic medical problems      SUBJECTIVE:                                                    History of present illness     We reviewed the chronic medical problems as above.   I reviewed the recent tests results in Epic       Housing  -- Rukhsana complains that there is a smell at her apt building that affects her health. The smells enters her house through the venting system. It causes nausea and she coughs more She brought papers for me to sign that she can change the apt and the apt building She found a new place .     ROS:     See below      PMHx: - reviewed  Past Medical History:    Diagnosis Date     Benign essential hypertension      Bipolar disorder (H)      Chronic abdominal pain      Chronic constipation      COPD (chronic obstructive pulmonary disease) (H)      Hypothyroidism      Impaired fasting glucose      Kidney stone      Obesity (BMI 30-39.9)      PONV (postoperative nausea and vomiting)      Pre-diabetes      Pure hypercholesterolemia        PSHx: reviewed  Past Surgical History:   Procedure Laterality Date     ANESTHESIA OUT OF OR MRI N/A 10/07/2021    Procedure: ANESTHESIA OUT OF OR MRI;  Surgeon: GENERIC ANESTHESIA PROVIDER;  Location: [facility] OR     ANESTHESIA OUT OF OR MRI N/A 5/11/2023    Procedure: MRI OF NECK WITH AND WITHOUT CONTRAST;  Surgeon: GENERIC ANESTHESIA PROVIDER;  Location: [facility] OR     ANESTHESIA OUT OF OR MRI Left 6/26/2023    Procedure: Anesthesia out of OR MRI;  Surgeon: GENERIC ANESTHESIA PROVIDER;  Location: [facility] OR     APPENDECTOMY       ARTHROSCOPY SHOULDER DECOMPRESSION Left 10/21/2015    Procedure: ARTHROSCOPY SHOULDER DECOMPRESSION;  Surgeon: Julien Milian MD;  Location: [facility] OR     BIOPSY ARTERY TEMPORAL Left 03/11/2020    Procedure: LEFT TEMPORAL ARTERY BIOPSY;  Surgeon: Ibeth Conner MD;  Location: [facility]     BRONCHIAL THERMOPLASTY N/A 11/14/2014    Procedure: BRONCHIAL THERMOPLASTY;  Surgeon: Ward Whitaker MD;  Location: [facility]     BRONCHIAL THERMOPLASTY N/A 12/19/2014    Procedure: BRONCHIAL THERMOPLASTY;  Surgeon: Ward Whitaker MD;  [facility]     BRONCHIAL THERMOPLASTY N/A 02/06/2015    Procedure: BRONCHIAL THERMOPLASTY;  Surgeon: Ward Whitaker MD;  [facility]: [facility]     COLONOSCOPY N/A 11/26/2018    Procedure: COLONOSCOPY ([facility]);  Surgeon: Marshall Oakes MD;  Location: [facility] OR     COLONOSCOPY N/A 10/22/2020    Procedure: Colonoscopy;  Surgeon: Marshall Mccormick MD;  Location: [facility] OR     COLONOSCOPY N/A 01/20/2023    Procedure: COLONOSCOPY, FLEXIBLE, WITH LESION REMOVAL USING SNARE;  Surgeon: Carson Domínguez  MD Rafa;  Location:  GI     COMBINED CYSTOSCOPY, RETROGRADES, URETEROSCOPY, LASER HOLMIUM LITHOTRIPSY URETER(S), INSERT STENT Right 7/19/2023    Procedure: Cystoscopy, Right Ureteroscopy, Right Retrograde Pyelogram;  Surgeon: Sammy Herndon MD;  Location: [facility] OR     DISCECTOMY, FUSION CERVICAL ANTERIOR ONE LEVEL, COMBINED N/A 05/01/2018    Procedure: COMBINED DISCECTOMY, FUSION CERVICAL ANTERIOR ONE LEVEL;  1.  C5-C6 anterior cervical diskectomy and fusion.    2.  C5-C6 application of intervertebral biomechanical device for interbody fusion purposes.    3.  C5-C6 anterior instrumentation using the standard Kristin InViZia 24 mm plate with four associated bone screws, 12 mm in length.  Inferior screws are fixed.  Superior screws are va     ESOPHAGOSCOPY, GASTROSCOPY, DUODENOSCOPY (EGD), COMBINED N/A 10/22/2020    Procedure: Esophagoscopy, gastroscopy, duodenoscopy with biopsies;  Surgeon: Marshall Mccormick MD;  Location:  OR     ESOPHAGOSCOPY, GASTROSCOPY, DUODENOSCOPY (EGD), COMBINED N/A 06/17/2021    Procedure: ESOPHAGOGASTRODUODENOSCOPY, WITH BIOPSY;  Surgeon: Darrel Damon MD;  Location:  GI     EXCISE MASS NECK Left 02/01/2023    Procedure: exploration of  NECK left;  Surgeon: Ibeth Conner MD;  Location:  OR     EXCISE MASS TRUNK Left 11/03/2021    Procedure: EXCISION LEFT SHOULDER LIPOMA;  Surgeon: Ibeth Conner MD;  Location:  OR     EXCISE NODE MEDIASTINAL  04/26/2013    Procedure: EXCISE NODE MEDIASTINAL;;  Surgeon: Av Peña MD;  Location:  OR     LAPAROSCOPIC CHOLECYSTECTOMY N/A 10/19/2017    Procedure: LAPAROSCOPIC CHOLECYSTECTOMY;  LAPAROSCOPIC CHOLECYSTECTOMY;  Surgeon: Ney Jerry MD;  Location:  OR     LARYNGOSCOPY, EXCISE VOCAL CORD LESION, COMBINED       THORACOSCOPY  04/26/2013    Procedure: THORACOSCOPY;  LEFT VIDEO ASSISTED THORACOSCOPY, RESECTION OF POSTERIOR MEDIASTINAL MASS;  Surgeon: Av Peña MD;  Location:  [facility] OR     TONSILLECTOMY & ADENOIDECTOMY          Soc Hx: No daily alcohol, no smoking  Social History     Socioeconomic History     Marital status:      Spouse name: Not on file     Number of children: Not on file     Years of education: Not on file     Highest education level: Not on file   Occupational History     Not on file   Tobacco Use     Smoking status: Every Day     Packs/day: 0.30     Years: 30.00     Additional pack years: 0.00     Total pack years: 9.00     Types: Cigarettes     Passive exposure: Past     Smokeless tobacco: Never   Vaping Use     Vaping Use: Never used   Substance and Sexual Activity     Alcohol use: Not Currently     Drug use: No     Sexual activity: Not Currently   Other Topics Concern      Service Not Asked     Blood Transfusions Not Asked     Caffeine Concern No     Comment: 4-5 cans of pop daily     Occupational Exposure Not Asked     Hobby Hazards Not Asked     Sleep Concern Not Asked     Stress Concern Not Asked     Weight Concern Not Asked     Special Diet No     Back Care Not Asked     Exercise No     Comment: on hold     Bike Helmet Not Asked     Seat Belt Not Asked     Self-Exams Not Asked     Parent/sibling w/ CABG, MI or angioplasty before 65F 55M? Yes   Social History Narrative     Not on file     Social Determinants of Health     Financial Resource Strain: Low Risk  (10/13/2023)    Financial Resource Strain      Within the past 12 months, have you or your family members you live with been unable to get utilities (heat, electricity) when it was really needed?: No   Food Insecurity: High Risk (10/13/2023)    Food Insecurity      Within the past 12 months, did you worry that your food would run out before you got money to buy more?: Yes      Within the past 12 months, did the food you bought just not last and you didn t have money to get more?: Yes   Transportation Needs: High Risk (10/13/2023)    Transportation Needs      Within the past 12 months, has lack of  transportation kept you from medical appointments, getting your medicines, non-medical meetings or appointments, work, or from getting things that you need?: Yes   Physical Activity: Not on file   Stress: Not on file   Social Connections: Not on file   Interpersonal Safety: Low Risk  (10/18/2023)    Interpersonal Safety      Do you feel physically and emotionally safe where you currently live?: Yes      Within the past 12 months, have you been hit, slapped, kicked or otherwise physically hurt by someone?: No      Within the past 12 months, have you been humiliated or emotionally abused in other ways by your partner or ex-partner?: No   Housing Stability: Low Risk  (10/13/2023)    Housing Stability      Do you have housing? : Yes      Are you worried about losing your housing?: No        Fam Hx: reviewed  Family History   Problem Relation Age of Onset     Myocardial Infarction Mother      Hypertension Mother      Cerebrovascular Disease Mother          Screening: reviewed      All: reviewed    Meds: reviewed  Current Outpatient Medications   Medication Sig Dispense Refill     ACCU-CHEK GUIDE test strip USE TO TEST BLOOD SUGAR BLOOD SUGAR ONE TIME DAILY OR AS DIRECTED 100 strip 0     acetaminophen (TYLENOL) 500 MG tablet Take 2 tablets (1,000 mg) by mouth every 6 hours as needed for pain       acetaminophen (TYLENOL) 650 MG CR tablet Take 1 tablet (650 mg) by mouth every 8 hours as needed for mild pain or fever 60 tablet 1     albuterol (PROAIR HFA/PROVENTIL HFA/VENTOLIN HFA) 108 (90 Base) MCG/ACT inhaler INHALE TWO PUFFS BY MOUTH EVERY 6 HOURS 18 g 0     albuterol (PROVENTIL) (2.5 MG/3ML) 0.083% neb solution INHALE ONE VIAL BY NEBULIZER EVERY 6 HOURS AS NEEDED FOR SHORTNESS OF BREATH OR WHEEZING Strength: (2.5 MG/3ML) 0.083% 75 mL 1     ALLERGY RELIEF CETIRIZINE 10 MG tablet TAKE ONE TABLET BY MOUTH EVERY DAY AS NEEDED 90 tablet 2     amLODIPine (NORVASC) 2.5 MG tablet Take 1 tablet (2.5 mg) by mouth daily 90 tablet  3     atorvastatin (LIPITOR) 80 MG tablet TAKE ONE TABLET BY MOUTH EVERY DAY 90 tablet 0     benzoyl peroxide (ACNE-CLEAR) 10 % external gel Apply topically 2 times daily as needed 90 g 1     blood glucose (ACCU-CHEK ALLYSON PLUS) test strip USE TO TEST BLOOD SUGAR ONCE DAILY OR AS DIRECTED 100 strip 3     blood glucose (NO BRAND SPECIFIED) lancets standard Use to test blood sugar 1 times daily or as directed. 100 Lancet 0     blood glucose (NO BRAND SPECIFIED) lancets standard Use to test blood sugar 1 times daily or as directed. 100 each 3     blood glucose (NO BRAND SPECIFIED) test strip Use to test blood sugar 1 times daily or as directed.  Dispense Accu-chek Allyson Plus or per patient insurance 100 strip 3     blood glucose monitoring (NO BRAND SPECIFIED) meter device kit Use to test blood sugar 1 times daily or as directed.  Dispense Accu-chek Allyson Plus or per insurance preference 1 kit 0     blood glucose monitoring (SOFTCLIX) lancets USE TO TEST BLOOD SUGAR ONCE DAILY OR AS DIRECTED 100 each 0     budesonide-formoterol (SYMBICORT) 160-4.5 MCG/ACT Inhaler Inhale 2 puffs into the lungs 2 times daily 30.6 g 1     buPROPion (WELLBUTRIN XL) 150 MG 24 hr tablet TAKE ONE TABLET BY MOUTH EVERY MORNING 90 tablet 1     clindamycin (CLINDAMAX) 1 % external gel Apply topically 2 times daily 60 g 3     diazepam (VALIUM) 10 MG tablet Take 1 tablet by mouth 2 times daily       dimenhyDRINATE (DRAMAMINE) 50 MG tablet Take 1 tablet (50 mg) by mouth nightly as needed for sleep 14 tablet 0     Emollient (CERAVE MOISTURIZING) CREA Externally apply 1 Application topically 2 times daily 453 g 11     furosemide (LASIX) 20 MG tablet TAKE ONE TABLET BY MOUTH TWICE A  tablet 3     gabapentin (NEURONTIN) 100 MG capsule Take 1 capsule (100 mg) by mouth 3 times daily 90 capsule 4     hydrocortisone 2.5 % cream APPLY TO THE AFFECTED AREA(S) TWO TIMES A DAY 30 g 1     hydrOXYzine (ATARAX) 50 MG tablet TAKE TWO TABLETS BY MOUTH THREE  TIMES A DAY AS NEEDED FOR ANXIETY 70 tablet 9     ibuprofen (ADVIL/MOTRIN) 200 MG tablet Take 3 tablets (600 mg) by mouth every 6 hours as needed for moderate pain (4-6)       ibuprofen (ADVIL/MOTRIN) 400 MG tablet Take 1 tablet (400 mg) by mouth every 6 hours as needed for moderate pain 60 tablet 1     ketorolac (TORADOL) 10 MG tablet Take 1 tablet (10 mg) by mouth every 6 hours as needed for moderate pain 10 tablet 0     lamoTRIgine (LAMICTAL) 150 MG tablet Take 150 mg by mouth 2 times daily       levothyroxine (SYNTHROID/LEVOTHROID) 50 MCG tablet Take 1 tablet (50 mcg) by mouth daily 90 tablet 0     lisinopril (ZESTRIL) 10 MG tablet Take 1 tablet (10 mg) by mouth daily 90 tablet 3     metFORMIN (GLUCOPHAGE) 500 MG tablet Take 1 tablet (500 mg) by mouth daily (with breakfast) 90 tablet 0     metoprolol tartrate (LOPRESSOR) 25 MG tablet Take 1 tablet (25 mg) by mouth 2 times daily 60 tablet 3     minoxidil (ROGAINE) 5 % external solution apply per package directions to the scalp once daily 120 mL 3     montelukast (SINGULAIR) 10 MG tablet TAKE ONE TABLET BY MOUTH AT BEDTIME 90 tablet 1     multivitamin, therapeutic (THERA-VIT) TABS tablet Take 1 tablet by mouth daily       naloxone (NARCAN) 4 MG/0.1ML nasal spray Spray 1 spray (4 mg) into one nostril alternating nostrils once as needed for opioid reversal every 2-3 minutes until assistance arrives 0.2 mL 0     nitroGLYcerin (NITROSTAT) 0.4 MG sublingual tablet PLACE ONE TABLET UNDER THE TONGUE AT THE 1ST SIGN OF ATTACK. IF PAIN IS UNRELIEVED OR WORSENED 5 MINUTES AFTER 1ST DOSE, PROMPT MEDICAL ASSISTANCE IS NEEDED. MAY REPEAT EVERY 5 MINUTES UNTIL PAIN IS RELIEVED. MAX OF THREE DOSES 25 tablet 11     nystatin (MYCOSTATIN) 965797 UNIT/GM external powder Apply topically 2 times daily 120 g 1     nystatin (MYCOSTATIN) 415628 UNIT/GM external powder APPLY TOPICALLY TWICE A DAY AS NEEDED SKIN RASH 45 g 9     omeprazole (PRILOSEC) 20 MG DR capsule TAKE ONE CAPSULE BY  "MOUTH TWICE A  capsule 3     ondansetron (ZOFRAN ODT) 4 MG ODT tab Take 1 tablet (4 mg) by mouth every 8 hours as needed for nausea 4 tablet 0     ondansetron (ZOFRAN-ODT) 4 MG ODT tab Take 1 tablet (4 mg) by mouth every 6 hours as needed for nausea 12 tablet 1     oxyCODONE-acetaminophen (PERCOCET) 5-325 MG tablet Take 1 tablet by mouth every 6 hours as needed       permethrin (LICE TREATMENT CREME RINSE) 1 % external liquid Apply topically from the neck down, leave on overnight (approx. 8 hours) and repeat 1 weeks later. 120 mL 1     polyethylene glycol (MIRALAX) 17 GM/Dose powder DISSOLVE 17 GRAMS (1 CAPFUL) AND DRINK BY MOUTH ONCE DAILY Strength: 17 GM/SCOOP 510 g 1     RYBELSUS 14 MG tablet TAKE ONE TABLET BY MOUTH EVERY DAY 30 tablet 0     senna-docusate (SENOKOT-S/PERICOLACE) 8.6-50 MG tablet Take 1-2 tablets by mouth 2 times daily 30 tablet 0     sucralfate (CARAFATE) 1 GM tablet TAKE ONE TABLET BY MOUTH FOUR TIMES A DAY AS NEEDED FOR NAUSEA 120 tablet 0     sulfamethoxazole-trimethoprim (BACTRIM DS) 800-160 MG tablet Take 1 tablet by mouth 2 times daily Take one tablet twice daily. For additional refills, please schedule a follow-up appointment. 180 tablet 1     terbinafine (LAMISIL) 1 % external cream APPLY TO AFFECTED AREA(S) TWO TIMES A DAY 30 g 2     traZODone (DESYREL) 100 MG tablet 100 mg       triamcinolone (KENALOG) 0.025 % external ointment Apply topically 2 times daily 80 g 1     Vitamin D3 (CHOLECALCIFEROL) 125 MCG (5000 UT) tablet Take 1 tablet by mouth three times a week         OBJECTIVE:                                                    Physical Exam :   Blood pressure 122/74, pulse 92, temperature (!) 96.1  F (35.6  C), temperature source Tympanic, resp. rate 16, height 1.6 m (5' 3\"), weight 97.4 kg (214 lb 12.8 oz), last menstrual period 01/01/2014, SpO2 97%, not currently breastfeeding.   NAD, appears comfortable  Skin clear, no rashes  Neck: supple, no JVD,  no " "thyroidmegaly  Lymph nodes non palpable in the cervical, supraclavicular axillaries,   Chest: clear to auscultation with good respiratory effort  Cardiac: S1S2, RRR, no mgr appreciated  Abdomen: soft, not tender, not distended, audible bowel sound, no hepatosplenomegaly, no palpable masses, no abdominal bruits  Extremities: no cyanosis, clubbing or edema.   Neuro: A, Ox3, no focal signs.  Breast exam in supine and erect position: they are symmetrical, no skin changes, no tenderness or nodes on palpation. Nipples are erect, no skin lesions, no discharge on pressure.    Pelvic exam: deferred, she wants to see BROOKLYN Low MD  Internal Medicine         SUBJECTIVE:   CC: Swetha is an 53 year old who presents for preventive health visit.       10/18/2023     6:56 AM   Additional Questions   Roomed by Shauna Ferrera       Healthy Habits:     In general, how would you rate your overall health?  Fair    Frequency of exercise:  2-3 days/week    Duration of exercise:  Less than 15 minutes    Do you usually eat at least 4 servings of fruit and vegetables a day, include whole grains    & fiber and avoid regularly eating high fat or \"junk\" foods?  No    Taking medications regularly:  Yes    Medication side effects:  Muscle aches, Lightheadedness and Other    Ability to successfully perform activities of daily living:  Transportation requires assistance, housework requires assistance and laundry requires assistance    Home Safety:  Lack of grab bars in the bathroom    Hearing Impairment:  Difficulty following a conversation in a noisy restaurant or crowded room    In the past 6 months, have you been bothered by leaking of urine? Yes    In general, how would you rate your overall mental or emotional health?  Fair      Today's PHQ-9 Score:       10/18/2023     6:52 AM   PHQ-9 SCORE   PHQ-9 Total Score MyChart 14 (Moderate depression)   PHQ-9 Total Score 14                 Diabetes Follow-up    How often are you " checking your blood sugar? Two times daily  Blood sugar testing frequency justification:  Uncontrolled diabetes  What time of day are you checking your blood sugars (select all that apply)?  Before and after meals  Have you had any blood sugars above 200?  No  Have you had any blood sugars below 70?  No  What symptoms do you notice when your blood sugar is low?  None  What concerns do you have today about your diabetes? None   Do you have any of these symptoms? (Select all that apply)  Numbness in feet and Excessive thirst          Hyperlipidemia Follow-Up    Are you regularly taking any medication or supplement to lower your cholesterol?   Yes- atorvastatin  Are you having muscle aches or other side effects that you think could be caused by your cholesterol lowering medication?  No    Hypertension Follow-up    Do you check your blood pressure regularly outside of the clinic? Yes   Are you following a low salt diet? No  Are your blood pressures ever more than 140 on the top number (systolic) OR more   than 90 on the bottom number (diastolic), for example 140/90? Yes    BP Readings from Last 2 Encounters:   10/10/23 92/60   09/22/23 127/80     Hemoglobin A1C (%)   Date Value   08/17/2022 5.5   01/24/2022 5.8 (H)   10/16/2020 5.6   02/20/2020 6.0 (H)     LDL Cholesterol Calculated   Date Value   08/17/2022      Comment:     Cannot estimate LDL when triglyceride exceeds 400 mg/dL   01/24/2022 80 mg/dL   02/20/2020 96 mg/dL   05/10/2019 176 mg/dL (H)     LDL Cholesterol Direct (mg/dL)   Date Value   08/17/2022 72         Social History     Tobacco Use     Smoking status: Every Day     Packs/day: 0.30     Years: 30.00     Additional pack years: 0.00     Total pack years: 9.00     Types: Cigarettes     Passive exposure: Past     Smokeless tobacco: Never   Substance Use Topics     Alcohol use: Not Currently             10/13/2023    10:40 AM   Alcohol Use   Prescreen: >3 drinks/day or >7 drinks/week? No     Reviewed orders  with patient.  Reviewed health maintenance and updated orders accordingly - Yes  Labs reviewed in EPIC    Breast Cancer Screenin/24/2022     9:13 AM 2023    11:02 AM   Breast CA Risk Assessment (FHS-7)   Do you have a family history of breast, colon, or ovarian cancer? Yes No / Unknown           Pertinent mammograms are reviewed under the imaging tab.    History of abnormal Pap smear:       Latest Ref Rng & Units 3/11/2016     8:20 AM 3/11/2016    12:00 AM 2012    10:03 AM   PAP / HPV   PAP (Historical)   NIL  NIL    HPV 16 DNA NEG Negative      HPV 18 DNA NEG Negative      Other HR HPV NEG Negative        Reviewed and updated as needed this visit by clinical staff   Tobacco  Allergies  Meds   Med Hx  Surg Hx  Fam Hx          Reviewed and updated as needed this visit by Provider                     Review of Systems   Constitutional:  Negative for chills.   HENT:  Negative for congestion, ear pain, hearing loss and sore throat.    Eyes:  Negative for pain and visual disturbance.   Respiratory:  Positive for cough and shortness of breath.    Cardiovascular:  Positive for chest pain and palpitations.   Gastrointestinal:  Positive for abdominal pain and nausea. Negative for constipation, diarrhea, heartburn and hematochezia.   Breasts:  Negative for tenderness, breast mass and discharge.   Genitourinary:  Positive for frequency, pelvic pain, urgency and vaginal discharge. Negative for genital sores, hematuria and vaginal bleeding.   Musculoskeletal:  Positive for arthralgias, joint swelling and myalgias.   Skin:  Negative for rash.   Neurological:  Positive for dizziness, weakness and headaches. Negative for paresthesias.   Psychiatric/Behavioral:  Positive for mood changes. The patient is nervous/anxious.        Patient has been advised of split billing requirements and indicates understanding: Yes At the check in, at the        COUNSELING:  Reviewed preventive health counseling,  "as reflected in patient instructions       Regular exercise       Healthy diet/nutrition      BMI:   Estimated body mass index is 38.05 kg/m  as calculated from the following:    Height as of this encounter: 1.6 m (5' 3\").    Weight as of this encounter: 97.4 kg (214 lb 12.8 oz).   Weight management plan: Discussed healthy diet and exercise guidelines      She reports that she has been smoking cigarettes. She has a 9.00 pack-year smoking history. She has been exposed to tobacco smoke. She has never used smokeless tobacco.  Nicotine/Tobacco Cessation Plan:   Information offered: Patient not interested at this time          Roro Chawla MD  [facility]  Answers submitted by the patient for this visit:  Patient Health Questionnaire (Submitted on 10/18/2023)  If you checked off any problems, how difficult have these problems made it for you to do your work, take care of things at home, or get along with other people?: Somewhat difficult  PHQ9 TOTAL SCORE: 14  The patient was provided with suggestions to help her develop a healthy physical lifestyle.  The patient was counseled and encouraged to consider modifying their diet and eating habits. She was provided with information on recommended healthy diet options.  The patient reports that she has difficulty with activities of daily living. I have asked that the patient make a follow up appointment in  weeks where this issue will be further evaluated and addressed.  The patient was provided with written information regarding signs of hearing loss.  Information on urinary incontinence and treatment options given to patient.  The patient was provided with suggestions to help her develop a healthy emotional lifestyle.  The patient s PHQ-9 score is consistent with moderate depression. She was provided with information regarding depression and was advised to schedule a follow up appointment in  weeks to further address this issue.  "

## 2023-10-18 NOTE — PATIENT INSTRUCTIONS
Plan:  Forms to change the housing   2. No change in meds  3. The following vaccines are recommended for you.   -- flu  -- Covid  -- RSV -- because of COPD  -- Pneumonia 20  -- Tetanus vaccine  - Td  -- Shingrix vaccine - second dose  Patient Education   Personalized Prevention Plan  You are due for the preventive services outlined below.  Your care team is available to assist you in scheduling these services.  If you have already completed any of these items, please share that information with your care team to update in your medical record.  Health Maintenance Due   Topic Date Due     Mammogram  06/18/2016     Pneumococcal Vaccine (2 - PCV) 11/21/2019     Zoster (Shingles) Vaccine (2 of 2) 12/02/2020     HPV Screening  03/11/2021     PAP Smear  03/11/2021     LUNG CANCER SCREENING  05/28/2022     Cholesterol Lab  08/17/2023     Kidney Microalbumin Urine Test  08/17/2023     Flu Vaccine (1) 09/01/2023     COVID-19 Vaccine (6 - 2023-24 season) 09/01/2023     Your Health Risk Assessment indicates you feel you are not in good health    A healthy lifestyle helps keep the body fit and the mind alert. It helps protect you from disease, helps you fight disease, and helps prevent chronic disease (disease that doesn't go away) from getting worse. This is important as you get older and begin to notice twinges in muscles and joints and a decline in the strength and stamina you once took for granted. A healthy lifestyle includes good healthcare, good nutrition, weight control, recreation, and regular exercise. Avoid harmful substances and do what you can to keep safe. Another part of a healthy lifestyle is stay mentally active and socially involved.    Good healthcare   Have a wellness visit every year.   If you have new symptoms, let us know right away. Don't wait until the next checkup.   Take medicines exactly as prescribed and keep your medicines in a safe place. Tell us if your medicine causes problems.   Healthy diet  and weight control   Eat 3 or 4 small, nutritious, low-fat, high-fiber meals a day. Include a variety of fruits, vegetables, and whole-grain foods.   Make sure you get enough calcium in your diet. Calcium, vitamin D, and exercise help prevent osteoporosis (bone thinning).   If you live alone, try eating with others when you can. That way you get a good meal and have company while you eat it.   Try to keep a healthy weight. If you eat more calories than your body uses for energy, it will be stored as fat and you will gain weight.     Recreation   Recreation is not limited to sports and team events. It includes any activity that provides relaxation, interest, enjoyment, and exercise. Recreation provides an outlet for physical, mental, and social energy. It can give a sense of worth and achievement. It can help you stay healthy.    Mental Exercise and Social Involvement  Mental and emotional health is as important as physical health. Keep in touch with friends and family. Stay as active as possible. Continue to learn and challenge yourself.   Things you can do to stay mentally active are:  Learn something new, like a foreign language or musical instrument.   Play SCRABBLE or do crossword puzzles. If you cannot find people to play these games with you at home, you can play them with others on your computer through the Internet.   Join a games club--anything from card games to chess or checkers or lawn bowling.   Start a new hobby.   Go back to school.   Volunteer.   Read.   Keep up with world events.  Learning About Dietary Guidelines  What are the Dietary Guidelines for Americans?     Dietary Guidelines for Americans provide tips for eating well and staying healthy. This helps reduce the risk for long-term (chronic) diseases.  These guidelines recommend that you:  Eat and drink the right amount for you. The U.[place] government's food guide is called MyPlate. It can help you make your own well-balanced eating plan.  Try to  "balance your eating with your activity. This helps you stay at a healthy weight.  Drink alcohol in moderation, if at all.  Limit foods high in salt, saturated fat, trans fat, and added sugar.  These guidelines are from the U.S. Department of Agriculture and the U.S. Department of Health and Human Services. They are updated every 5 years.  What is MyPlate?  MyPlate is the U.[place] government's food guide. It can help you make your own well-balanced eating plan. A balanced eating plan means that you eat enough, but not too much, and that your food gives you the nutrients you need to stay healthy.  MyPlate focuses on eating plenty of whole grains, fruits, and vegetables, and on limiting fat and sugar. It is available online at www.ChooseMyPlate.gov.  How can you get started?  If you're trying to eat healthier, you can slowly change your eating habits over time. You don't have to make big changes all at once. Start by adding one or two healthy foods to your meals each day.  Grains  Choose whole-grain breads and cereals and whole-wheat pasta and whole-grain crackers.  Vegetables  Eat a variety of vegetables every day. They have lots of nutrients and are part of a heart-healthy diet.  Fruits  Eat a variety of fruits every day. Fruits contain lots of nutrients. Choose fresh fruit instead of fruit juice.  Protein foods  Choose fish and lean poultry more often. Eat red meat and fried meats less often. Dried beans, tofu, and nuts are also good sources of protein.  Dairy  Choose low-fat or fat-free products from this food group. If you have problems digesting milk, try eating cheese or yogurt instead.  Fats and oils  Limit fats and oils if you're trying to cut calories. Choose healthy fats when you cook. These include canola oil and olive oil.  Where can you learn more?  Go to https://www.healthwise.net/patiented  Enter D676 in the search box to learn more about \"Learning About Dietary Guidelines.\"  Current as of: March 1, " 2023               Content Version: 13.7    1940-6925 [employer].   Care instructions adapted under license by your healthcare professional. If you have questions about a medical condition or this instruction, always ask your healthcare professional. [employer] disclaims any warranty or liability for your use of this information.      Activities of Daily Living    Your Health Risk Assessment indicates you have difficulties with activities of daily living such as housework, bathing, preparing meals, taking medication, etc. Please make a follow up appointment for us to address this issue in more detail.  Hearing Loss: Care Instructions  Overview     Hearing loss is a sudden or slow decrease in how well you hear. It can range from slight to profound. Permanent hearing loss can occur with aging. It also can happen when you are exposed long-term to loud noise. Examples include listening to loud music, riding motorcycles, or being around other loud machines.  Hearing loss can affect your work and home life. It can make you feel lonely or depressed. You may feel that you have lost your independence. But hearing aids and other devices can help you hear better and feel connected to others.  Follow-up care is a key part of your treatment and safety. Be sure to make and go to all appointments, and call your doctor if you are having problems. It's also a good idea to know your test results and keep a list of the medicines you take.  How can you care for yourself at home?  Avoid loud noises whenever possible. This helps keep your hearing from getting worse.  Always wear hearing protection around loud noises.  Wear a hearing aid as directed.  A professional can help you pick a hearing aid that will work best for you.  You can also get hearing aids over the counter for mild to moderate hearing loss.  Have hearing tests as your doctor suggests. They can show whether your hearing has changed. Your hearing  "aid may need to be adjusted.  Use other devices as needed. These may include:  Telephone amplifiers and hearing aids that can connect to a television, stereo, radio, or microphone.  Devices that use lights or vibrations. These alert you to the doorbell, a ringing telephone, or a baby monitor.  Television closed-captioning. This shows the words at the bottom of the screen. Most new TVs can do this.  TTY (text telephone). This lets you type messages back and forth on the telephone instead of talking or listening. These devices are also called TDD. When messages are typed on the keyboard, they are sent over the phone line to a receiving TTY. The message is shown on a monitor.  Use text messaging, social media, and email if it is hard for you to communicate by telephone.  Try to learn a listening technique called speechreading. It is not lipreading. You pay attention to people's gestures, expressions, posture, and tone of voice. These clues can help you understand what a person is saying. Face the person you are talking to, and have them face you. Make sure the lighting is good. You need to see the other person's face clearly.  Think about counseling if you need help to adjust to your hearing loss.  When should you call for help?  Watch closely for changes in your health, and be sure to contact your doctor if:    You think your hearing is getting worse.     You have new symptoms, such as dizziness or nausea.   Where can you learn more?  Go to https://www.[employer].net/patiented  Enter R798 in the search box to learn more about \"Hearing Loss: Care Instructions.\"  Current as of: March 1, 2023               Content Version: 13.7    6154-8035 [employer].   Care instructions adapted under license by your healthcare professional. If you have questions about a medical condition or this instruction, always ask your healthcare professional. [employer] disclaims any warranty or liability for your use " of this information.      Bladder Training: Care Instructions  Your Care Instructions     Bladder training is used to treat urge incontinence and stress incontinence. Urge incontinence means that the need to urinate comes on so fast that you can't get to a toilet in time. Stress incontinence means that you leak urine because of pressure on your bladder. For example, it may happen when you laugh, cough, or lift something heavy.  Bladder training can increase how long you can wait before you have to urinate. It can also help your bladder hold more urine. And it can give you better control over the urge to urinate.  It is important to remember that bladder training takes a few weeks to a few months to make a difference. You may not see results right away, but don't give up.  Follow-up care is a key part of your treatment and safety. Be sure to make and go to all appointments, and call your doctor if you are having problems. It's also a good idea to know your test results and keep a list of the medicines you take.  How can you care for yourself at home?  Work with your doctor to come up with a bladder training program that is right for you. You may use one or more of the following methods.  Delayed urination  In the beginning, try to keep from urinating for 5 minutes after you first feel the need to go.  While you wait, take deep, slow breaths to relax. Kegel exercises can also help you delay the need to go to the bathroom.  After some practice, when you can easily wait 5 minutes to urinate, try to wait 10 minutes before you urinate.  Slowly increase the waiting period until you are able to control when you have to urinate.  Scheduled urination  Empty your bladder when you first wake up in the morning.  Schedule times throughout the day when you will urinate.  Start by going to the bathroom every hour, even if you don't need to go.  Slowly increase the time between trips to the bathroom.  When you have found a schedule  "that works well for you, keep doing it.  If you wake up during the night and have to urinate, do it. Apply your schedule to waking hours only.  Kegel exercises  These tighten and strengthen pelvic muscles, which can help you control the flow of urine. (If doing these exercises causes pain, stop doing them and talk with your doctor.) To do Kegel exercises:  Squeeze your muscles as if you were trying not to pass gas. Or squeeze your muscles as if you were stopping the flow of urine. Your belly, legs, and buttocks shouldn't move.  Hold the squeeze for 3 seconds, then relax for 5 to 10 seconds.  Start with 3 seconds, then add 1 second each week until you are able to squeeze for 10 seconds.  Repeat the exercise 10 times a session. Do 3 to 8 sessions a day.  When should you call for help?  Watch closely for changes in your health, and be sure to contact your doctor if:    Your incontinence is getting worse.     You do not get better as expected.   Where can you learn more?  Go to https://www.[employer].net/patiented  Enter V684 in the search box to learn more about \"Bladder Training: Care Instructions.\"  Current as of: March 1, 2023               Content Version: 13.7    4704-2545 [employer].   Care instructions adapted under license by your healthcare professional. If you have questions about a medical condition or this instruction, always ask your healthcare professional. [employer] disclaims any warranty or liability for your use of this information.      Your Health Risk Assessment indicates you feel you are not in good emotional health.    Recreation   Recreation is not limited to sports and team events. It includes any activity that provides relaxation, interest, enjoyment, and exercise. Recreation provides an outlet for physical, mental, and social energy. It can give a sense of worth and achievement. It can help you stay healthy.    Mental Exercise and Social Involvement  Mental and " emotional health is as important as physical health. Keep in touch with friends and family. Stay as active as possible. Continue to learn and challenge yourself.   Things you can do to stay mentally active are:  Learn something new, like a foreign language or musical instrument.   Play SCRABBLE or do crossword puzzles. If you cannot find people to play these games with you at home, you can play them with others on your computer through the Internet.   Join a games club--anything from card games to chess or checkers or lawn bowling.   Start a new hobby.   Go back to school.   Volunteer.   Read.   Keep up with world events.  Learning About Depression Screening  What is depression screening?  Depression screening is a way to see if you have depression symptoms. It may be done by a doctor or counselor. It's often part of a routine checkup. That's because your mental health is just as important as your physical health.  Depression is a mental health condition that affects how you feel, think, and act. You may:  Have less energy.  Lose interest in your daily activities.  Feel sad and grouchy for a long time.  Depression is very common. It affects people of all ages.  Many things can lead to depression. Some people become depressed after they have a stroke or find out they have a major illness like cancer or heart disease. The death of a loved one or a breakup may lead to depression. It can run in families. Most experts believe that a combination of inherited genes and stressful life events can cause it.  What happens during screening?  You may be asked to fill out a form about your depression symptoms. You and the doctor will discuss your answers. The doctor may ask you more questions to learn more about how you think, act, and feel.  What happens after screening?  If you have symptoms of depression, your doctor will talk to you about your options.  Doctors usually treat depression with medicines or counseling. Often,  "combining the two works best. Many people don't get help because they think that they'll get over the depression on their own. But people with depression may not get better unless they get treatment.  The cause of depression is not well understood. There may be many factors involved. But if you have depression, it's not your fault.  A serious symptom of depression is thinking about death or suicide. If you or someone you care about talks about this or about feeling hopeless, get help right away.  It's important to know that depression can be treated. Medicine, counseling, and self-care may help.  Where can you learn more?  Go to https://www.[employer].net/patiented  Enter T185 in the search box to learn more about \"Learning About Depression Screening.\"  Current as of: October 20, 2022               Content Version: 13.7    7568-7800 [employer].   Care instructions adapted under license by your healthcare professional. If you have questions about a medical condition or this instruction, always ask your healthcare professional. [employer] disclaims any warranty or liability for your use of this information.         "

## 2023-10-19 ENCOUNTER — TELEPHONE (OUTPATIENT)
Dept: DERMATOLOGY | Facility: CLINIC | Age: 53
End: 2023-10-19

## 2023-10-19 RX ORDER — SULFAMETHOXAZOLE/TRIMETHOPRIM 800-160 MG
TABLET ORAL
Qty: 180 TABLET | Refills: 0 | OUTPATIENT
Start: 2023-10-19

## 2023-10-19 NOTE — TELEPHONE ENCOUNTER
Patient Contactedand schedule the following:    Appointment type: Return  Provider: Ashley Antonio  Return date: 1/04/24  Specialty phone number: [phone]

## 2023-10-20 DIAGNOSIS — I49.3 PVC'S (PREMATURE VENTRICULAR CONTRACTIONS): ICD-10-CM

## 2023-10-20 DIAGNOSIS — R00.2 PALPITATIONS: ICD-10-CM

## 2023-10-20 RX ORDER — METOPROLOL TARTRATE 50 MG
50 TABLET ORAL 2 TIMES DAILY
Qty: 60 TABLET | Refills: 11 | Status: SHIPPED | OUTPATIENT
Start: 2023-10-20 | End: 2023-11-15

## 2023-10-24 ENCOUNTER — NURSE TRIAGE (OUTPATIENT)
Dept: INTERNAL MEDICINE | Facility: CLINIC | Age: 53
End: 2023-10-24

## 2023-10-24 DIAGNOSIS — R07.9 CHEST PAIN, UNSPECIFIED TYPE: ICD-10-CM

## 2023-10-24 RX ORDER — NITROGLYCERIN 0.4 MG/1
TABLET SUBLINGUAL
Qty: 25 TABLET | Refills: 11 | OUTPATIENT
Start: 2023-10-24

## 2023-10-24 RX ORDER — NITROGLYCERIN 0.4 MG/1
TABLET SUBLINGUAL
Qty: 25 TABLET | Refills: 4 | Status: SHIPPED | OUTPATIENT
Start: 2023-10-24

## 2023-10-24 NOTE — TELEPHONE ENCOUNTER
S-(situation): Chest pain     B-(background): Patient stating she has been gradually having more episodes of chest pain this month. Patient states her nitroglycerin is . Patient is moving out of her apartment today and doing a lot of physical work. Patient stating she has had multiple episodes of chest pain today lasting seconds. Patient can also feel her heartbeat being irregular.     A-(assessment): Patient stated she is currently resting and chest pain and irregular heartbeat is gone for now.     R-(recommendations): Per RN protocol, patient to be seen in ED now. Patient declining disposition. Patient would like to get a refill of Nitroglycerin. RN reviewed red flag symptoms with patient and when to seek emergency care. No further questions or concerns at this time.    Reason for Disposition   Heart beating irregularly or very rapidly    Additional Information   Negative: SEVERE difficulty breathing (e.g., struggling for each breath, speaks in single words)   Negative: Difficult to awaken or acting confused (e.g., disoriented, slurred speech)   Negative: Shock suspected (e.g., cold/pale/clammy skin, too weak to stand, low BP, rapid pulse)   Negative: Passed out (i.e., lost consciousness, collapsed and was not responding)   Negative: Chest pain lasting longer than 5 minutes and ANY of the following:         Pain is crushing, pressure-like, or heavy         Over 44 years old          Over 30 years old and one cardiac risk factor (e.g diabetes, high blood pressure, high cholesterol, smoker, or family history of heart disease)         History of heart disease (e.g. angina, heart attack, heart failure, bypass surgery, takes nitroglycerin)   Negative: Heart beating < 50 beats per minute OR > 140 beats per minute   Negative: Visible sweat on face or sweat dripping down face   Negative: Sounds like a life-threatening emergency to the triager   Negative: Followed an injury to chest   Negative: SEVERE chest pain    Negative: Pain also in shoulder(s) or arm(s) or jaw   Negative: Difficulty breathing   Negative: Cocaine use within last 3 days   Negative: Major surgery in the past month   Negative: Hip or leg fracture (broken bone) in past month (or had cast on leg or ankle in past month)   Negative: Illness requiring prolonged bedrest in past month (e.g., immobilization, long hospital stay)   Negative: Long-distance travel in past month (e.g., car, bus, train, plane; with trip lasting 6 or more hours)   Negative: History of prior 'blood clot' in leg or lungs (i.e., deep vein thrombosis, pulmonary embolism)   Negative: History of inherited increased risk of blood clots (e.g., Factor 5 Leiden, Anti-thrombin 3, Protein C or Protein S deficiency, Prothrombin mutation)   Negative: Cancer treatment in the past two months (or has cancer now)    Protocols used: Chest Pain-A-[place]

## 2023-10-25 ENCOUNTER — HOSPITAL ENCOUNTER (OUTPATIENT)
Dept: CARDIOLOGY | Facility: CLINIC | Age: 53
Discharge: HOME | End: 2023-10-25
Attending: PHYSICIAN ASSISTANT | Admitting: PHYSICIAN ASSISTANT
Payer: COMMERCIAL

## 2023-10-25 DIAGNOSIS — R00.2 PALPITATIONS: ICD-10-CM

## 2023-10-25 DIAGNOSIS — I49.3 PVC'S (PREMATURE VENTRICULAR CONTRACTIONS): ICD-10-CM

## 2023-10-25 PROCEDURE — 93225 XTRNL ECG REC<48 HRS REC: CPT

## 2023-10-25 PROCEDURE — 93227 XTRNL ECG REC<48 HR R&I: CPT | Performed by: INTERNAL MEDICINE

## 2023-10-26 ENCOUNTER — TELEPHONE (OUTPATIENT)
Dept: UROLOGY | Facility: CLINIC | Age: 53
End: 2023-10-26

## 2023-10-26 NOTE — TELEPHONE ENCOUNTER
Advised patient she should contact her PMD regarding her kidney function labs.  Penny Oconnor LPN

## 2023-10-26 NOTE — TELEPHONE ENCOUNTER
[facility] Call Center    Phone Message    May a detailed message be left on voicemail: yes     Reason for Call: Other: pt calling about her lab results, says kidney labs are off the chart, please advise     Action Taken: Other: urology    Travel Screening: Not Applicable

## 2023-10-31 ENCOUNTER — TELEPHONE (OUTPATIENT)
Dept: INTERNAL MEDICINE | Facility: CLINIC | Age: 53
End: 2023-10-31

## 2023-10-31 NOTE — TELEPHONE ENCOUNTER
Fax received from [facility] 10/26/23 / POC  Nbr. [id number] for review and signature.  Put in Dr. Regan's in basket.

## 2023-11-01 DIAGNOSIS — Z53.9 DIAGNOSIS NOT YET DEFINED: Primary | ICD-10-CM

## 2023-11-01 PROCEDURE — G0179 MD RECERTIFICATION HHA PT: HCPCS | Performed by: INTERNAL MEDICINE

## 2023-11-03 ENCOUNTER — TELEPHONE (OUTPATIENT)
Dept: CARDIOLOGY | Facility: CLINIC | Age: 53
End: 2023-11-03

## 2023-11-03 NOTE — TELEPHONE ENCOUNTER
[facility] Call Center    Phone Message    May a detailed message be left on voicemail: yes     Reason for Call: Other: Patient called requesting to speak with a member of her care team in regards to her Holter Monitor 24 hour Adult Pediatric results. Please call patient back to further discuss.     Action Taken: Message routed to:  Other: Cardiology    Travel Screening: Not Applicable    Thank you!  [facility]

## 2023-11-03 NOTE — TELEPHONE ENCOUNTER
Call placed to patient, reviewed with her the the provider has not yet reviewed her results. Patient is also scheduled to see Dr. Tate 11/15/23. If there is nothing urgent that needs to be address immediately, results will be reviewed at  on the 15th. Patient verbalized understanding.    Dayana RUANO RN  11/03/23 at 12:16 PM

## 2023-11-04 DIAGNOSIS — E11.9 TYPE 2 DIABETES MELLITUS WITHOUT COMPLICATION, WITHOUT LONG-TERM CURRENT USE OF INSULIN (H): ICD-10-CM

## 2023-11-04 DIAGNOSIS — R10.13 EPIGASTRIC PAIN: ICD-10-CM

## 2023-11-06 ENCOUNTER — TRANSFERRED RECORDS (OUTPATIENT)
Dept: HEALTH INFORMATION MANAGEMENT | Facility: CLINIC | Age: 53
End: 2023-11-06

## 2023-11-09 DIAGNOSIS — R10.13 EPIGASTRIC PAIN: ICD-10-CM

## 2023-11-09 DIAGNOSIS — E11.9 TYPE 2 DIABETES MELLITUS WITHOUT COMPLICATION, WITHOUT LONG-TERM CURRENT USE OF INSULIN (H): ICD-10-CM

## 2023-11-09 RX ORDER — ORAL SEMAGLUTIDE 14 MG/1
TABLET ORAL
Qty: 30 TABLET | Refills: 0 | OUTPATIENT
Start: 2023-11-09

## 2023-11-09 RX ORDER — SUCRALFATE 1 G/1
TABLET ORAL
Qty: 120 TABLET | Refills: 0 | OUTPATIENT
Start: 2023-11-09

## 2023-11-10 DIAGNOSIS — E78.5 HYPERLIPIDEMIA LDL GOAL <130: Chronic | ICD-10-CM

## 2023-11-10 DIAGNOSIS — J44.9 COPD, MODERATE (H): ICD-10-CM

## 2023-11-10 RX ORDER — ATORVASTATIN CALCIUM 80 MG/1
80 TABLET, FILM COATED ORAL DAILY
Qty: 90 TABLET | Refills: 0 | OUTPATIENT
Start: 2023-11-10

## 2023-11-10 RX ORDER — OMEPRAZOLE 40 MG/1
CAPSULE, DELAYED RELEASE ORAL
Qty: 60 CAPSULE | Refills: 2 | OUTPATIENT
Start: 2023-11-10

## 2023-11-13 RX ORDER — ORAL SEMAGLUTIDE 14 MG/1
TABLET ORAL
Qty: 30 TABLET | Refills: 0 | Status: SHIPPED | OUTPATIENT
Start: 2023-11-13 | End: 2023-12-02

## 2023-11-13 RX ORDER — SUCRALFATE 1 G/1
TABLET ORAL
Qty: 120 TABLET | Refills: 0 | Status: SHIPPED | OUTPATIENT
Start: 2023-11-13 | End: 2024-09-17

## 2023-11-15 ENCOUNTER — OFFICE VISIT (OUTPATIENT)
Dept: CARDIOLOGY | Facility: CLINIC | Age: 53
End: 2023-11-15
Attending: PHYSICIAN ASSISTANT
Payer: COMMERCIAL

## 2023-11-15 VITALS
HEART RATE: 79 BPM | DIASTOLIC BLOOD PRESSURE: 64 MMHG | HEIGHT: 63 IN | WEIGHT: 221 LBS | BODY MASS INDEX: 39.16 KG/M2 | SYSTOLIC BLOOD PRESSURE: 122 MMHG | OXYGEN SATURATION: 93 %

## 2023-11-15 DIAGNOSIS — I49.3 PVC'S (PREMATURE VENTRICULAR CONTRACTIONS): ICD-10-CM

## 2023-11-15 DIAGNOSIS — I10 HTN, GOAL BELOW 140/90: ICD-10-CM

## 2023-11-15 DIAGNOSIS — R06.02 SOB (SHORTNESS OF BREATH): ICD-10-CM

## 2023-11-15 PROCEDURE — 99214 OFFICE O/P EST MOD 30 MIN: CPT | Performed by: INTERNAL MEDICINE

## 2023-11-15 RX ORDER — ALBUTEROL SULFATE 90 UG/1
AEROSOL, METERED RESPIRATORY (INHALATION)
Qty: 8.5 G | Refills: 0 | Status: SHIPPED | OUTPATIENT
Start: 2023-11-15 | End: 2023-12-13

## 2023-11-15 RX ORDER — NEBIVOLOL 5 MG/1
5 TABLET ORAL DAILY
Qty: 30 TABLET | Refills: 11 | Status: SHIPPED | OUTPATIENT
Start: 2023-11-15

## 2023-11-15 RX ORDER — ESZOPICLONE 2 MG/1
2 TABLET, FILM COATED ORAL AT BEDTIME
COMMUNITY
Start: 2023-11-14

## 2023-11-15 RX ORDER — LAMOTRIGINE 200 MG/1
1 TABLET ORAL
COMMUNITY
Start: 2023-11-10

## 2023-11-15 RX ORDER — CARIPRAZINE 6 MG/1
6 CAPSULE, GELATIN COATED ORAL DAILY
COMMUNITY
Start: 2023-11-10

## 2023-11-15 NOTE — LETTER
11/15/2023    Willy Espana MD  [address] [facility] [address]    RE: Swetha Patterson       Dear Colleague,     I had the pleasure of seeing Swetha Patterson in the [facility].  HPI and Plan:   Swetha Patterson is a 53 year old female who presents with history of advanced COPD, hypertension, hyperlipidemia, morbid obesity and palpitations with evidence of significant ventricular ectopic burden.  She has been seen by multiple cardiologists, mostly recently Dr. Farris and was placed on metoprolol for the PVCs.  She had an initial Zio patch showing approximately 17% burden of PVCs.  After starting low-dose metoprolol she underwent a repeat heart monitor which showed a significant reduction in overall PVC burden down to 8%.  She comes in with numerous complaints today including increased shortness of breath, cold left hand, quivering left side of her lip and continued intermittent atypical chest pains.  She had an echocardiogram in September that showed normal LV and RV function without valve disease and had a nuclear stress test last year that was negative for evidence of ischemic heart disease.    Summary    1.  Palpitations with PVCs-significant decrease in ventricular ectopic burden with use of low-dose metoprolol however I am concerned given her advanced COPD and reactive airway disease that metoprolol may trigger this.  I will recommend switching to Bystolic which has less risk of reactive airway disease and I gave her a new prescription for this at 5 mg daily.  She will stop taking metoprolol.    2.  Shortness of breath-other cardiac testing including stress test and echo have been unremarkable.  I recommend following up with her pulmonologist for her breathing difficulties.    Please feel free to contact me with any questions you have regards to her care         Today's clinic visit entailed:    30 minutes spent by me on the date of the encounter doing chart review,  history and exam, documentation and further activities per the note  Provider  Link to MDM Help Grid         No orders of the defined types were placed in this encounter.    Orders Placed This Encounter   Medications    eszopiclone (LUNESTA) 2 MG tablet    VRAYLAR 6 MG capsule     Sig: Take 6 mg by mouth daily    lamoTRIgine (LAMICTAL) 200 MG tablet     Sig: Take 1 tablet by mouth 2 times daily    nebivolol (BYSTOLIC) 5 MG tablet     Sig: Take 1 tablet (5 mg) by mouth daily     Dispense:  30 tablet     Refill:  11     Medications Discontinued During This Encounter   Medication Reason    acetaminophen (TYLENOL) 650 MG CR tablet Stopped by Patient (No AVS)    ibuprofen (ADVIL/MOTRIN) 400 MG tablet Duplicate Therapy (No AVS / No eCancel)    ketorolac (TORADOL) 10 MG tablet Stopped by Patient (No AVS)    minoxidil (ROGAINE) 5 % external solution Stopped by Patient (No AVS)    nystatin (MYCOSTATIN) 453253 UNIT/GM external powder Duplicate Therapy (No AVS / No eCancel)    ondansetron (ZOFRAN-ODT) 4 MG ODT tab Duplicate Therapy (No AVS / No eCancel)    oxyCODONE-acetaminophen (PERCOCET) 5-325 MG tablet Stopped by Patient (No AVS)    permethrin (LICE TREATMENT CREME RINSE) 1 % external liquid Stopped by Patient (No AVS)    traZODone (DESYREL) 100 MG tablet Discontinued by another Health Care Provider (No AVS)    metoprolol tartrate (LOPRESSOR) 50 MG tablet          Encounter Diagnoses   Name Primary?    HTN, goal below 140/90     PVC's (premature ventricular contractions)     SOB (shortness of breath)        CURRENT MEDICATIONS:  Current Outpatient Medications   Medication Sig Dispense Refill    ACCU-CHEK GUIDE test strip USE TO TEST BLOOD SUGAR BLOOD SUGAR ONE TIME DAILY OR AS DIRECTED 100 strip 0    acetaminophen (TYLENOL) 500 MG tablet Take 2 tablets (1,000 mg) by mouth every 6 hours as needed for pain      albuterol (PROAIR HFA/PROVENTIL HFA/VENTOLIN HFA) 108 (90 Base) MCG/ACT inhaler INHALE TWO PUFFS BY MOUTH EVERY 6  HOURS 18 g 0    albuterol (PROVENTIL) (2.5 MG/3ML) 0.083% neb solution INHALE ONE VIAL BY NEBULIZER EVERY 6 HOURS AS NEEDED FOR SHORTNESS OF BREATH OR WHEEZING Strength: (2.5 MG/3ML) 0.083% 75 mL 1    ALLERGY RELIEF CETIRIZINE 10 MG tablet TAKE ONE TABLET BY MOUTH EVERY DAY AS NEEDED 90 tablet 2    atorvastatin (LIPITOR) 80 MG tablet Take 1 tablet (80 mg) by mouth daily 90 tablet 1    benzoyl peroxide (ACNE-CLEAR) 10 % external gel Apply topically 2 times daily as needed 90 g 1    blood glucose (ACCU-CHEK ALLYSON PLUS) test strip USE TO TEST BLOOD SUGAR ONCE DAILY OR AS DIRECTED 100 strip 3    blood glucose (NO BRAND SPECIFIED) lancets standard Use to test blood sugar 1 times daily or as directed. 100 Lancet 0    blood glucose (NO BRAND SPECIFIED) lancets standard Use to test blood sugar 1 times daily or as directed. 100 each 3    blood glucose (NO BRAND SPECIFIED) test strip Use to test blood sugar 1 times daily or as directed.  Dispense Accu-chek Allyson Plus or per patient insurance 100 strip 3    blood glucose monitoring (NO BRAND SPECIFIED) meter device kit Use to test blood sugar 1 times daily or as directed.  Dispense Accu-chek Allyson Plus or per insurance preference 1 kit 0    blood glucose monitoring (SOFTCLIX) lancets USE TO TEST BLOOD SUGAR ONCE DAILY OR AS DIRECTED 100 each 0    budesonide-formoterol (SYMBICORT) 160-4.5 MCG/ACT Inhaler Inhale 2 puffs into the lungs 2 times daily 30.6 g 1    buPROPion (WELLBUTRIN XL) 150 MG 24 hr tablet TAKE ONE TABLET BY MOUTH EVERY MORNING 90 tablet 0    clindamycin (CLINDAMAX) 1 % external gel Apply topically 2 times daily 60 g 3    diazepam (VALIUM) 10 MG tablet Take 1 tablet by mouth 2 times daily      dimenhyDRINATE (DRAMAMINE) 50 MG tablet Take 1 tablet (50 mg) by mouth nightly as needed for sleep 14 tablet 0    Emollient (CERAVE MOISTURIZING) CREA Externally apply 1 Application topically 2 times daily 453 g 11    eszopiclone (LUNESTA) 2 MG tablet       furosemide  (LASIX) 20 MG tablet TAKE ONE TABLET BY MOUTH TWICE A  tablet 3    gabapentin (NEURONTIN) 100 MG capsule Take 1 capsule (100 mg) by mouth 3 times daily 90 capsule 4    hydrocortisone 2.5 % cream APPLY TO THE AFFECTED AREA(S) TWO TIMES A DAY 30 g 1    hydrOXYzine (ATARAX) 50 MG tablet TAKE TWO TABLETS BY MOUTH THREE TIMES A DAY AS NEEDED FOR ANXIETY 70 tablet 9    ibuprofen (ADVIL/MOTRIN) 200 MG tablet Take 3 tablets (600 mg) by mouth every 6 hours as needed for moderate pain (4-6)      lamoTRIgine (LAMICTAL) 150 MG tablet Take 150 mg by mouth 2 times daily      lamoTRIgine (LAMICTAL) 200 MG tablet Take 1 tablet by mouth 2 times daily      levothyroxine (SYNTHROID/LEVOTHROID) 50 MCG tablet Take 1 tablet (50 mcg) by mouth daily 90 tablet 0    lisinopril (ZESTRIL) 10 MG tablet Take 1 tablet (10 mg) by mouth daily 90 tablet 3    metFORMIN (GLUCOPHAGE) 500 MG tablet Take 1 tablet (500 mg) by mouth daily (with breakfast) 90 tablet 0    montelukast (SINGULAIR) 10 MG tablet TAKE ONE TABLET BY MOUTH AT BEDTIME 90 tablet 1    multivitamin, therapeutic (THERA-VIT) TABS tablet Take 1 tablet by mouth daily      naloxone (NARCAN) 4 MG/0.1ML nasal spray Spray 1 spray (4 mg) into one nostril alternating nostrils once as needed for opioid reversal every 2-3 minutes until assistance arrives 0.2 mL 0    nebivolol (BYSTOLIC) 5 MG tablet Take 1 tablet (5 mg) by mouth daily 30 tablet 11    nitroGLYcerin (NITROSTAT) 0.4 MG sublingual tablet PLACE ONE TABLET UNDER THE TONGUE AT THE 1ST SIGN OF ATTACK. IF PAIN IS UNRELIEVED OR WORSENED 5 MINUTES AFTER 1ST DOSE, PROMPT MEDICAL ASSISTANCE IS NEEDED. MAY REPEAT EVERY 5 MINUTES UNTIL PAIN IS RELIEVED. MAX OF THREE DOSES 25 tablet 4    nystatin (MYCOSTATIN) 249844 UNIT/GM external powder APPLY TOPICALLY TWICE A DAY AS NEEDED SKIN RASH 45 g 9    omeprazole (PRILOSEC) 20 MG DR capsule TAKE ONE CAPSULE BY MOUTH TWICE A  capsule 3    ondansetron (ZOFRAN ODT) 4 MG ODT tab Take 1 tablet  (4 mg) by mouth every 8 hours as needed for nausea 4 tablet 0    polyethylene glycol (MIRALAX) 17 GM/Dose powder DISSOLVE 17 GRAMS (1 CAPFUL) AND DRINK BY MOUTH ONCE DAILY Strength: 17 GM/SCOOP 510 g 1    RYBELSUS 14 MG tablet TAKE ONE TABLET BY MOUTH EVERY DAY 30 tablet 0    senna-docusate (SENOKOT-S/PERICOLACE) 8.6-50 MG tablet Take 1-2 tablets by mouth 2 times daily 30 tablet 0    sucralfate (CARAFATE) 1 GM tablet TAKE ONE TABLET BY MOUTH FOUR TIMES A DAY AS NEEDED FOR NAUSEA 120 tablet 0    sulfamethoxazole-trimethoprim (BACTRIM DS) 800-160 MG tablet Take 1 tablet by mouth 2 times daily Take one tablet twice daily. For additional refills, please schedule a follow-up appointment. 180 tablet 1    terbinafine (LAMISIL) 1 % external cream APPLY TO AFFECTED AREA(S) TWO TIMES A DAY 30 g 2    triamcinolone (KENALOG) 0.025 % external ointment Apply topically 2 times daily 80 g 1    Vitamin D3 (CHOLECALCIFEROL) 125 MCG (5000 UT) tablet Take 1 tablet by mouth three times a week      VRAYLAR 6 MG capsule Take 6 mg by mouth daily         ALLERGIES     Allergies   Allergen Reactions    Codeine Nausea and Vomiting    Cyclobenzaprine Nausea and Vomiting    Hydrocodone Nausea and Vomiting       PAST MEDICAL HISTORY:  Past Medical History:   Diagnosis Date    Benign essential hypertension     Bipolar disorder (H)     Chronic abdominal pain     Chronic constipation     COPD (chronic obstructive pulmonary disease) (H)     Hypothyroidism     Impaired fasting glucose     Kidney stone     Obesity (BMI 30-39.9)     PONV (postoperative nausea and vomiting)     Pre-diabetes     Pure hypercholesterolemia        PAST SURGICAL HISTORY:  Past Surgical History:   Procedure Laterality Date    ANESTHESIA OUT OF OR MRI N/A 10/07/2021    Procedure: ANESTHESIA OUT OF OR MRI;  Surgeon: GENERIC ANESTHESIA PROVIDER;  Location:  OR    ANESTHESIA OUT OF OR MRI N/A 5/11/2023    Procedure: MRI OF NECK WITH AND WITHOUT CONTRAST;  Surgeon: GENERIC  ANESTHESIA PROVIDER;  Location:  OR    ANESTHESIA OUT OF OR MRI Left 6/26/2023    Procedure: Anesthesia out of OR MRI;  Surgeon: GENERIC ANESTHESIA PROVIDER;  Location: [facility] OR    APPENDECTOMY      ARTHROSCOPY SHOULDER DECOMPRESSION Left 10/21/2015    Procedure: ARTHROSCOPY SHOULDER DECOMPRESSION;  Surgeon: Julien Milian MD;  Location: [facility] OR    BIOPSY ARTERY TEMPORAL Left 03/11/2020    Procedure: LEFT TEMPORAL ARTERY BIOPSY;  Surgeon: Ibeth Conner MD;  Location: [facility] OR    BRONCHIAL THERMOPLASTY N/A 11/14/2014    Procedure: BRONCHIAL THERMOPLASTY;  Surgeon: Ward Whitaker MD;  Location: [facility] GI    BRONCHIAL THERMOPLASTY N/A 12/19/2014    Procedure: BRONCHIAL THERMOPLASTY;  Surgeon: Ward Whitaker MD;  [facility]: [facility]    BRONCHIAL THERMOPLASTY N/A 02/06/2015    Procedure: BRONCHIAL THERMOPLASTY;  Surgeon: Ward Whitaker MD;  Location: [facility]    COLONOSCOPY N/A 11/26/2018    Procedure: COLONOSCOPY ([facility]);  Surgeon: Marshall Oakes MD;  Location:  OR    COLONOSCOPY N/A 10/22/2020    Procedure: Colonoscopy;  Surgeon: Marshall Mccormick MD;  Location:  OR    COLONOSCOPY N/A 01/20/2023    Procedure: COLONOSCOPY, FLEXIBLE, WITH LESION REMOVAL USING SNARE;  Surgeon: Carson Domínguez MD;  Location:  GI    COMBINED CYSTOSCOPY, RETROGRADES, URETEROSCOPY, LASER HOLMIUM LITHOTRIPSY URETER(S), INSERT STENT Right 7/19/2023    Procedure: Cystoscopy, Right Ureteroscopy, Right Retrograde Pyelogram;  Surgeon: Sammy Herndon MD;  Location: [facility] OR    DISCECTOMY, FUSION CERVICAL ANTERIOR ONE LEVEL, COMBINED N/A 05/01/2018    Procedure: COMBINED DISCECTOMY, FUSION CERVICAL ANTERIOR ONE LEVEL;  1.  C5-C6 anterior cervical diskectomy and fusion.    2.  C5-C6 application of intervertebral biomechanical device for interbody fusion purposes.    3.  C5-C6 anterior instrumentation using the standard Kristin InViZia 24 mm plate with four associated bone screws, 12 mm in length.   Inferior screws are fixed.  Superior screws are va    ESOPHAGOSCOPY, GASTROSCOPY, DUODENOSCOPY (EGD), COMBINED N/A 10/22/2020    Procedure: Esophagoscopy, gastroscopy, duodenoscopy with biopsies;  Surgeon: Marshall Mccormick MD;  Location: [facility] OR    ESOPHAGOSCOPY, GASTROSCOPY, DUODENOSCOPY (EGD), COMBINED N/A 06/17/2021    Procedure: ESOPHAGOGASTRODUODENOSCOPY, WITH BIOPSY;  Surgeon: Darrel Damon MD;  Location: [facility] GI    EXCISE MASS NECK Left 02/01/2023    Procedure: exploration of  NECK left;  Surgeon: Ibeth Conner MD;  Location: [facility] OR    EXCISE MASS TRUNK Left 11/03/2021    Procedure: EXCISION LEFT SHOULDER LIPOMA;  Surgeon: Ibeth Conner MD;  Location: [facility] OR    EXCISE NODE MEDIASTINAL  04/26/2013    Procedure: EXCISE NODE MEDIASTINAL;;  Surgeon: Av Peña MD;  Location: [facility]    LAPAROSCOPIC CHOLECYSTECTOMY N/A 10/19/2017    Procedure: LAPAROSCOPIC CHOLECYSTECTOMY;  LAPAROSCOPIC CHOLECYSTECTOMY;  Surgeon: Ney Jerry MD;  Location: [facility] OR    LARYNGOSCOPY, EXCISE VOCAL CORD LESION, COMBINED      THORACOSCOPY  04/26/2013    Procedure: THORACOSCOPY;  LEFT VIDEO ASSISTED THORACOSCOPY, RESECTION OF POSTERIOR MEDIASTINAL MASS;  Surgeon: Av Peña MD;  Location: [facility] OR    TONSILLECTOMY & ADENOIDECTOMY         FAMILY HISTORY:  Family History   Problem Relation Age of Onset    Myocardial Infarction Mother     Hypertension Mother     Cerebrovascular Disease Mother        SOCIAL HISTORY:  Social History     Socioeconomic History    Marital status:      Spouse name: None    Number of children: None    Years of education: None    Highest education level: None   Tobacco Use    Smoking status: Every Day     Packs/day: 0.30     Years: 30.00     Additional pack years: 0.00     Total pack years: 9.00     Types: Cigarettes     Passive exposure: Past    Smokeless tobacco: Never   Vaping Use    Vaping Use: Never used   Substance and Sexual Activity    Alcohol use: Not  Currently    Drug use: No    Sexual activity: Not Currently   Other Topics Concern    Caffeine Concern No     Comment: 4-5 cans of pop daily    Special Diet No    Exercise No     Comment: on hold    Parent/sibling w/ CABG, MI or angioplasty before 65F 55M? Yes     Social Determinants of Health     Financial Resource Strain: Low Risk  (10/13/2023)    Financial Resource Strain     Within the past 12 months, have you or your family members you live with been unable to get utilities (heat, electricity) when it was really needed?: No   Food Insecurity: High Risk (10/13/2023)    Food Insecurity     Within the past 12 months, did you worry that your food would run out before you got money to buy more?: Yes     Within the past 12 months, did the food you bought just not last and you didn t have money to get more?: Yes   Transportation Needs: High Risk (10/13/2023)    Transportation Needs     Within the past 12 months, has lack of transportation kept you from medical appointments, getting your medicines, non-medical meetings or appointments, work, or from getting things that you need?: Yes   Interpersonal Safety: Low Risk  (10/18/2023)    Interpersonal Safety     Do you feel physically and emotionally safe where you currently live?: Yes     Within the past 12 months, have you been hit, slapped, kicked or otherwise physically hurt by someone?: No     Within the past 12 months, have you been humiliated or emotionally abused in other ways by your partner or ex-partner?: No   Housing Stability: Low Risk  (10/13/2023)    Housing Stability     Do you have housing? : Yes     Are you worried about losing your housing?: No       Review of Systems:  Skin:  not assessed     Eyes:  not assessed    ENT:  not assessed    Respiratory:  Positive for cough;wheezing;shortness of breath  Cardiovascular:    Positive for;palpitations;chest pain;edema  Gastroenterology: not assessed    Genitourinary:  not assessed    Musculoskeletal:  not  "assessed    Neurologic:  not assessed    Psychiatric:  not assessed    Heme/Lymph/Imm:  not assessed    Endocrine:  not assessed      Physical Exam:  Vitals: /64 (BP Location: Right arm, Patient Position: Sitting, Cuff Size: Adult Large)   Pulse 79   Ht 1.6 m (5' 3\")   Wt 100.2 kg (221 lb)   LMP 01/01/2014 (Approximate)   SpO2 93%   BMI 39.15 kg/m      Constitutional:  cooperative;in no acute distress obese      Skin:  warm and dry to the touch;no apparent skin lesions or masses noted          Head:  normocephalic        Eyes:  pupils equal and round;conjunctivae and lids unremarkable;sclera white        Lymph:      ENT:  no pallor or cyanosis        Neck:           Respiratory:  clear to auscultation         Cardiac: regular rhythm;no murmurs, gallops or rubs detected;normal S1 and S2 frequent premature beats distant heart sounds                                                     GI:  abdomen soft;BS normoactive obese      Extremities and Muscular Skeletal:  no deformities, clubbing, cyanosis, erythema observed;no edema              Neurological:  no gross motor deficits;affect appropriate        Psych:  Alert and Oriented x 3        Recent Lab Results:  LIPID RESULTS:  Lab Results   Component Value Date    CHOL 161 08/17/2022    CHOL 167 02/20/2020    HDL 46 (L) 08/17/2022    HDL 35 (L) 02/20/2020    LDL  08/17/2022      Comment:      Cannot estimate LDL when triglyceride exceeds 400 mg/dL    LDL 72 08/17/2022    LDL 96 02/20/2020    TRIG 423 (H) 08/17/2022    TRIG 180 (H) 02/20/2020    CHOLHDLRATIO 4.9 05/15/2015       LIVER ENZYME RESULTS:  Lab Results   Component Value Date    AST 34 04/12/2023    AST 21 05/22/2021    ALT 31 04/12/2023    ALT 80 (H) 05/22/2021       CBC RESULTS:  Lab Results   Component Value Date    WBC 11.8 (H) 04/12/2023    WBC 10.0 05/22/2021    RBC 4.05 04/12/2023    RBC 4.70 05/22/2021    HGB 11.6 (L) 04/12/2023    HGB 12.4 05/22/2021    HCT 36.0 04/12/2023    HCT 38.9 " 05/22/2021    MCV 89 04/12/2023    MCV 83 05/22/2021    MCH 28.6 04/12/2023    MCH 26.4 (L) 05/22/2021    MCHC 32.2 04/12/2023    MCHC 31.9 05/22/2021    RDW 17.0 (H) 04/12/2023    RDW 16.8 (H) 05/22/2021     04/12/2023     05/22/2021       BMP RESULTS:  Lab Results   Component Value Date     10/10/2023     05/22/2021    POTASSIUM 4.5 10/10/2023    POTASSIUM 4.0 08/17/2022    POTASSIUM 3.6 05/22/2021    CHLORIDE 104 10/10/2023    CHLORIDE 103 08/17/2022    CHLORIDE 108 05/22/2021    CO2 23 10/10/2023    CO2 26 08/17/2022    CO2 26 05/22/2021    ANIONGAP 13 10/10/2023    ANIONGAP 10 08/17/2022    ANIONGAP 8 05/22/2021     (H) 10/10/2023     (H) 02/01/2023     (H) 08/17/2022    GLC 75 05/22/2021    BUN 18.9 10/10/2023    BUN 15 08/17/2022    BUN 14 05/22/2021    CR 1.33 (H) 10/10/2023    CR 1.01 05/22/2021    GFRESTIMATED 47 (L) 10/10/2023    GFRESTIMATED 64 05/22/2021    GFRESTBLACK 74 05/22/2021    GENE 9.1 10/10/2023    GENE 8.9 05/22/2021        A1C RESULTS:  Lab Results   Component Value Date    A1C 5.5 08/17/2022    A1C 5.6 10/16/2020       INR RESULTS:  Lab Results   Component Value Date    INR 0.99 11/20/2020    INR 0.94 09/29/2020           DENISE Morales PA-C  [address] [place] [address]      Thank you for allowing me to participate in the care of your patient.      Sincerely,     Kassidy Tate DO     [facility]

## 2023-11-15 NOTE — PROGRESS NOTES
HPI and Plan:   Swetha [place] is a 53 year old female who presents with history of advanced COPD, hypertension, hyperlipidemia, morbid obesity and palpitations with evidence of significant ventricular ectopic burden.  She has been seen by multiple cardiologists, mostly recently Dr. Farris and was placed on metoprolol for the PVCs.  She had an initial Zio patch showing approximately 17% burden of PVCs.  After starting low-dose metoprolol she underwent a repeat heart monitor which showed a significant reduction in overall PVC burden down to 8%.  She comes in with numerous complaints today including increased shortness of breath, cold left hand, quivering left side of her lip and continued intermittent atypical chest pains.  She had an echocardiogram in September that showed normal LV and RV function without valve disease and had a nuclear stress test last year that was negative for evidence of ischemic heart disease.    Summary    1.  Palpitations with PVCs-significant decrease in ventricular ectopic burden with use of low-dose metoprolol however I am concerned given her advanced COPD and reactive airway disease that metoprolol may trigger this.  I will recommend switching to Bystolic which has less risk of reactive airway disease and I gave her a new prescription for this at 5 mg daily.  She will stop taking metoprolol.    2.  Shortness of breath-other cardiac testing including stress test and echo have been unremarkable.  I recommend following up with her pulmonologist for her breathing difficulties.    Please feel free to contact me with any questions you have regards to her care         Today's clinic visit entailed:    30 minutes spent by me on the date of the encounter doing chart review, history and exam, documentation and further activities per the note  Provider  Link to [facility] Help Grid         No orders of the defined types were placed in this encounter.    Orders Placed This Encounter   Medications     eszopiclone (LUNESTA) 2 MG tablet    VRAYLAR 6 MG capsule     Sig: Take 6 mg by mouth daily    lamoTRIgine (LAMICTAL) 200 MG tablet     Sig: Take 1 tablet by mouth 2 times daily    nebivolol (BYSTOLIC) 5 MG tablet     Sig: Take 1 tablet (5 mg) by mouth daily     Dispense:  30 tablet     Refill:  11     Medications Discontinued During This Encounter   Medication Reason    acetaminophen (TYLENOL) 650 MG CR tablet Stopped by Patient (No AVS)    ibuprofen (ADVIL/MOTRIN) 400 MG tablet Duplicate Therapy (No AVS / No eCancel)    ketorolac (TORADOL) 10 MG tablet Stopped by Patient (No AVS)    minoxidil (ROGAINE) 5 % external solution Stopped by Patient (No AVS)    nystatin (MYCOSTATIN) 916368 UNIT/GM external powder Duplicate Therapy (No AVS / No eCancel)    ondansetron (ZOFRAN-ODT) 4 MG ODT tab Duplicate Therapy (No AVS / No eCancel)    oxyCODONE-acetaminophen (PERCOCET) 5-325 MG tablet Stopped by Patient (No AVS)    permethrin (LICE TREATMENT CREME RINSE) 1 % external liquid Stopped by Patient (No AVS)    traZODone (DESYREL) 100 MG tablet Discontinued by another Health Care Provider (No AVS)    metoprolol tartrate (LOPRESSOR) 50 MG tablet          Encounter Diagnoses   Name Primary?    HTN, goal below 140/90     PVC's (premature ventricular contractions)     SOB (shortness of breath)        CURRENT MEDICATIONS:  Current Outpatient Medications   Medication Sig Dispense Refill    ACCU-CHEK GUIDE test strip USE TO TEST BLOOD SUGAR BLOOD SUGAR ONE TIME DAILY OR AS DIRECTED 100 strip 0    acetaminophen (TYLENOL) 500 MG tablet Take 2 tablets (1,000 mg) by mouth every 6 hours as needed for pain      albuterol (PROAIR HFA/PROVENTIL HFA/VENTOLIN HFA) 108 (90 Base) MCG/ACT inhaler INHALE TWO PUFFS BY MOUTH EVERY 6 HOURS 18 g 0    albuterol (PROVENTIL) (2.5 MG/3ML) 0.083% neb solution INHALE ONE VIAL BY NEBULIZER EVERY 6 HOURS AS NEEDED FOR SHORTNESS OF BREATH OR WHEEZING Strength: (2.5 MG/3ML) 0.083% 75 mL 1    ALLERGY RELIEF  CETIRIZINE 10 MG tablet TAKE ONE TABLET BY MOUTH EVERY DAY AS NEEDED 90 tablet 2    atorvastatin (LIPITOR) 80 MG tablet Take 1 tablet (80 mg) by mouth daily 90 tablet 1    benzoyl peroxide (ACNE-CLEAR) 10 % external gel Apply topically 2 times daily as needed 90 g 1    blood glucose (ACCU-CHEK ALLYSON PLUS) test strip USE TO TEST BLOOD SUGAR ONCE DAILY OR AS DIRECTED 100 strip 3    blood glucose (NO BRAND SPECIFIED) lancets standard Use to test blood sugar 1 times daily or as directed. 100 Lancet 0    blood glucose (NO BRAND SPECIFIED) lancets standard Use to test blood sugar 1 times daily or as directed. 100 each 3    blood glucose (NO BRAND SPECIFIED) test strip Use to test blood sugar 1 times daily or as directed.  Dispense Accu-chek Allyson Plus or per patient insurance 100 strip 3    blood glucose monitoring (NO BRAND SPECIFIED) meter device kit Use to test blood sugar 1 times daily or as directed.  Dispense Accu-chek Allyson Plus or per insurance preference 1 kit 0    blood glucose monitoring (SOFTCLIX) lancets USE TO TEST BLOOD SUGAR ONCE DAILY OR AS DIRECTED 100 each 0    budesonide-formoterol (SYMBICORT) 160-4.5 MCG/ACT Inhaler Inhale 2 puffs into the lungs 2 times daily 30.6 g 1    buPROPion (WELLBUTRIN XL) 150 MG 24 hr tablet TAKE ONE TABLET BY MOUTH EVERY MORNING 90 tablet 0    clindamycin (CLINDAMAX) 1 % external gel Apply topically 2 times daily 60 g 3    diazepam (VALIUM) 10 MG tablet Take 1 tablet by mouth 2 times daily      dimenhyDRINATE (DRAMAMINE) 50 MG tablet Take 1 tablet (50 mg) by mouth nightly as needed for sleep 14 tablet 0    Emollient (CERAVE MOISTURIZING) CREA Externally apply 1 Application topically 2 times daily 453 g 11    eszopiclone (LUNESTA) 2 MG tablet       furosemide (LASIX) 20 MG tablet TAKE ONE TABLET BY MOUTH TWICE A  tablet 3    gabapentin (NEURONTIN) 100 MG capsule Take 1 capsule (100 mg) by mouth 3 times daily 90 capsule 4    hydrocortisone 2.5 % cream APPLY TO THE  AFFECTED AREA(S) TWO TIMES A DAY 30 g 1    hydrOXYzine (ATARAX) 50 MG tablet TAKE TWO TABLETS BY MOUTH THREE TIMES A DAY AS NEEDED FOR ANXIETY 70 tablet 9    ibuprofen (ADVIL/MOTRIN) 200 MG tablet Take 3 tablets (600 mg) by mouth every 6 hours as needed for moderate pain (4-6)      lamoTRIgine (LAMICTAL) 150 MG tablet Take 150 mg by mouth 2 times daily      lamoTRIgine (LAMICTAL) 200 MG tablet Take 1 tablet by mouth 2 times daily      levothyroxine (SYNTHROID/LEVOTHROID) 50 MCG tablet Take 1 tablet (50 mcg) by mouth daily 90 tablet 0    lisinopril (ZESTRIL) 10 MG tablet Take 1 tablet (10 mg) by mouth daily 90 tablet 3    metFORMIN (GLUCOPHAGE) 500 MG tablet Take 1 tablet (500 mg) by mouth daily (with breakfast) 90 tablet 0    montelukast (SINGULAIR) 10 MG tablet TAKE ONE TABLET BY MOUTH AT BEDTIME 90 tablet 1    multivitamin, therapeutic (THERA-VIT) TABS tablet Take 1 tablet by mouth daily      naloxone (NARCAN) 4 MG/0.1ML nasal spray Spray 1 spray (4 mg) into one nostril alternating nostrils once as needed for opioid reversal every 2-3 minutes until assistance arrives 0.2 mL 0    nebivolol (BYSTOLIC) 5 MG tablet Take 1 tablet (5 mg) by mouth daily 30 tablet 11    nitroGLYcerin (NITROSTAT) 0.4 MG sublingual tablet PLACE ONE TABLET UNDER THE TONGUE AT THE 1ST SIGN OF ATTACK. IF PAIN IS UNRELIEVED OR WORSENED 5 MINUTES AFTER 1ST DOSE, PROMPT MEDICAL ASSISTANCE IS NEEDED. MAY REPEAT EVERY 5 MINUTES UNTIL PAIN IS RELIEVED. MAX OF THREE DOSES 25 tablet 4    nystatin (MYCOSTATIN) 742644 UNIT/GM external powder APPLY TOPICALLY TWICE A DAY AS NEEDED SKIN RASH 45 g 9    omeprazole (PRILOSEC) 20 MG DR capsule TAKE ONE CAPSULE BY MOUTH TWICE A  capsule 3    ondansetron (ZOFRAN ODT) 4 MG ODT tab Take 1 tablet (4 mg) by mouth every 8 hours as needed for nausea 4 tablet 0    polyethylene glycol (MIRALAX) 17 GM/Dose powder DISSOLVE 17 GRAMS (1 CAPFUL) AND DRINK BY MOUTH ONCE DAILY Strength: 17 GM/SCOOP 510 g 1    RYBELSUS  14 MG tablet TAKE ONE TABLET BY MOUTH EVERY DAY 30 tablet 0    senna-docusate (SENOKOT-S/PERICOLACE) 8.6-50 MG tablet Take 1-2 tablets by mouth 2 times daily 30 tablet 0    sucralfate (CARAFATE) 1 GM tablet TAKE ONE TABLET BY MOUTH FOUR TIMES A DAY AS NEEDED FOR NAUSEA 120 tablet 0    sulfamethoxazole-trimethoprim (BACTRIM DS) 800-160 MG tablet Take 1 tablet by mouth 2 times daily Take one tablet twice daily. For additional refills, please schedule a follow-up appointment. 180 tablet 1    terbinafine (LAMISIL) 1 % external cream APPLY TO AFFECTED AREA(S) TWO TIMES A DAY 30 g 2    triamcinolone (KENALOG) 0.025 % external ointment Apply topically 2 times daily 80 g 1    Vitamin D3 (CHOLECALCIFEROL) 125 MCG (5000 UT) tablet Take 1 tablet by mouth three times a week      VRAYLAR 6 MG capsule Take 6 mg by mouth daily         ALLERGIES     Allergies   Allergen Reactions    Codeine Nausea and Vomiting    Cyclobenzaprine Nausea and Vomiting    Hydrocodone Nausea and Vomiting       PAST MEDICAL HISTORY:  Past Medical History:   Diagnosis Date    Benign essential hypertension     Bipolar disorder (H)     Chronic abdominal pain     Chronic constipation     COPD (chronic obstructive pulmonary disease) (H)     Hypothyroidism     Impaired fasting glucose     Kidney stone     Obesity (BMI 30-39.9)     PONV (postoperative nausea and vomiting)     Pre-diabetes     Pure hypercholesterolemia        PAST SURGICAL HISTORY:  Past Surgical History:   Procedure Laterality Date    ANESTHESIA OUT OF OR MRI N/A 10/07/2021    Procedure: ANESTHESIA OUT OF OR MRI;  Surgeon: GENERIC ANESTHESIA PROVIDER;  Location:  OR    ANESTHESIA OUT OF OR MRI N/A 5/11/2023    Procedure: MRI OF NECK WITH AND WITHOUT CONTRAST;  Surgeon: GENERIC ANESTHESIA PROVIDER;  Location:  OR    ANESTHESIA OUT OF OR MRI Left 6/26/2023    Procedure: Anesthesia out of OR MRI;  Surgeon: GENERIC ANESTHESIA PROVIDER;  Location:  OR    APPENDECTOMY      ARTHROSCOPY SHOULDER  DECOMPRESSION Left 10/21/2015    Procedure: ARTHROSCOPY SHOULDER DECOMPRESSION;  Surgeon: Julien Milian MD;  Location: [facility]    BIOPSY ARTERY TEMPORAL Left 03/11/2020    Procedure: LEFT TEMPORAL ARTERY BIOPSY;  Surgeon: Ibeth Conner MD;  Location: [facility] OR    BRONCHIAL THERMOPLASTY N/A 11/14/2014    Procedure: BRONCHIAL THERMOPLASTY;  Surgeon: Ward Whitaker MD;  [facility]    BRONCHIAL THERMOPLASTY N/A 12/19/2014    Procedure: BRONCHIAL THERMOPLASTY;  Surgeon: Ward Whitaker MD;  [facility]    BRONCHIAL THERMOPLASTY N/A 02/06/2015    Procedure: BRONCHIAL THERMOPLASTY;  Surgeon: Ward Whitaker MD;  [facility]    COLONOSCOPY N/A 11/26/2018    Procedure: COLONOSCOPY ([facility]);  Surgeon: Marshall Oakes MD;  Location: [facility] OR    COLONOSCOPY N/A 10/22/2020    Procedure: Colonoscopy;  Surgeon: Marshall Mccormick MD;  Location:  OR    COLONOSCOPY N/A 01/20/2023    Procedure: COLONOSCOPY, FLEXIBLE, WITH LESION REMOVAL USING SNARE;  Surgeon: Carson Domínguez MD;  Location:  GI    COMBINED CYSTOSCOPY, RETROGRADES, URETEROSCOPY, LASER HOLMIUM LITHOTRIPSY URETER(S), INSERT STENT Right 7/19/2023    Procedure: Cystoscopy, Right Ureteroscopy, Right Retrograde Pyelogram;  Surgeon: Sammy Herndon MD;  Location: [facility] [place] [place] OR    DISCECTOMY, FUSION CERVICAL ANTERIOR ONE LEVEL, COMBINED N/A 05/01/2018    Procedure: COMBINED DISCECTOMY, FUSION CERVICAL ANTERIOR ONE LEVEL;  1.  C5-C6 anterior cervical diskectomy and fusion.    2.  C5-C6 application of intervertebral biomechanical device for interbody fusion purposes.    3.  C5-C6 anterior instrumentation using the standard Kristin InViZia 24 mm plate with four associated bone screws, 12 mm in length.  Inferior screws are fixed.  Superior screws are va    ESOPHAGOSCOPY, GASTROSCOPY, DUODENOSCOPY (EGD), COMBINED N/A 10/22/2020    Procedure: Esophagoscopy, gastroscopy, duodenoscopy with biopsies;  Surgeon: Marshall Mccormick  MD;  Location: [facility] OR    ESOPHAGOSCOPY, GASTROSCOPY, DUODENOSCOPY (EGD), COMBINED N/A 06/17/2021    Procedure: ESOPHAGOGASTRODUODENOSCOPY, WITH BIOPSY;  Surgeon: Darrel Damon MD;  Location:  GI    EXCISE MASS NECK Left 02/01/2023    Procedure: exploration of  NECK left;  Surgeon: Ibeth Conner MD;  Location: [facility] OR    EXCISE MASS TRUNK Left 11/03/2021    Procedure: EXCISION LEFT SHOULDER LIPOMA;  Surgeon: Ibeth Conner MD;  Location: [facility] OR    EXCISE NODE MEDIASTINAL  04/26/2013    Procedure: EXCISE NODE MEDIASTINAL;;  Surgeon: Av Peña MD;  Location:  OR    LAPAROSCOPIC CHOLECYSTECTOMY N/A 10/19/2017    Procedure: LAPAROSCOPIC CHOLECYSTECTOMY;  LAPAROSCOPIC CHOLECYSTECTOMY;  Surgeon: Ney Jerry MD;  Location:  OR    LARYNGOSCOPY, EXCISE VOCAL CORD LESION, COMBINED      THORACOSCOPY  04/26/2013    Procedure: THORACOSCOPY;  LEFT VIDEO ASSISTED THORACOSCOPY, RESECTION OF POSTERIOR MEDIASTINAL MASS;  Surgeon: Av Peña MD;  Location:  OR    TONSILLECTOMY & ADENOIDECTOMY         FAMILY HISTORY:  Family History   Problem Relation Age of Onset    Myocardial Infarction Mother     Hypertension Mother     Cerebrovascular Disease Mother        SOCIAL HISTORY:  Social History     Socioeconomic History    Marital status:      Spouse name: None    Number of children: None    Years of education: None    Highest education level: None   Tobacco Use    Smoking status: Every Day     Packs/day: 0.30     Years: 30.00     Additional pack years: 0.00     Total pack years: 9.00     Types: Cigarettes     Passive exposure: Past    Smokeless tobacco: Never   Vaping Use    Vaping Use: Never used   Substance and Sexual Activity    Alcohol use: Not Currently    Drug use: No    Sexual activity: Not Currently   Other Topics Concern    Caffeine Concern No     Comment: 4-5 cans of pop daily    Special Diet No    Exercise No     Comment: on hold    Parent/sibling w/ CABG, MI or  angioplasty before 65F 55M? Yes     Social Determinants of Health     Financial Resource Strain: Low Risk  (10/13/2023)    Financial Resource Strain     Within the past 12 months, have you or your family members you live with been unable to get utilities (heat, electricity) when it was really needed?: No   Food Insecurity: High Risk (10/13/2023)    Food Insecurity     Within the past 12 months, did you worry that your food would run out before you got money to buy more?: Yes     Within the past 12 months, did the food you bought just not last and you didn t have money to get more?: Yes   Transportation Needs: High Risk (10/13/2023)    Transportation Needs     Within the past 12 months, has lack of transportation kept you from medical appointments, getting your medicines, non-medical meetings or appointments, work, or from getting things that you need?: Yes   Interpersonal Safety: Low Risk  (10/18/2023)    Interpersonal Safety     Do you feel physically and emotionally safe where you currently live?: Yes     Within the past 12 months, have you been hit, slapped, kicked or otherwise physically hurt by someone?: No     Within the past 12 months, have you been humiliated or emotionally abused in other ways by your partner or ex-partner?: No   Housing Stability: Low Risk  (10/13/2023)    Housing Stability     Do you have housing? : Yes     Are you worried about losing your housing?: No       Review of Systems:  Skin:  not assessed     Eyes:  not assessed    ENT:  not assessed    Respiratory:  Positive for cough;wheezing;shortness of breath  Cardiovascular:    Positive for;palpitations;chest pain;edema  Gastroenterology: not assessed    Genitourinary:  not assessed    Musculoskeletal:  not assessed    Neurologic:  not assessed    Psychiatric:  not assessed    Heme/Lymph/Imm:  not assessed    Endocrine:  not assessed      Physical Exam:  Vitals: /64 (BP Location: Right arm, Patient Position: Sitting, Cuff Size: Adult  "Large)   Pulse 79   Ht 1.6 m (5' 3\")   Wt 100.2 kg (221 lb)   LMP 01/01/2014 (Approximate)   SpO2 93%   BMI 39.15 kg/m      Constitutional:  cooperative;in no acute distress obese      Skin:  warm and dry to the touch;no apparent skin lesions or masses noted          Head:  normocephalic        Eyes:  pupils equal and round;conjunctivae and lids unremarkable;sclera white        Lymph:      ENT:  no pallor or cyanosis        Neck:           Respiratory:  clear to auscultation         Cardiac: regular rhythm;no murmurs, gallops or rubs detected;normal S1 and S2 frequent premature beats distant heart sounds                                                     GI:  abdomen soft;BS normoactive obese      Extremities and Muscular Skeletal:  no deformities, clubbing, cyanosis, erythema observed;no edema              Neurological:  no gross motor deficits;affect appropriate        Psych:  Alert and Oriented x 3        Recent Lab Results:  LIPID RESULTS:  Lab Results   Component Value Date    CHOL 161 08/17/2022    CHOL 167 02/20/2020    HDL 46 (L) 08/17/2022    HDL 35 (L) 02/20/2020    LDL  08/17/2022      Comment:      Cannot estimate LDL when triglyceride exceeds 400 mg/dL    LDL 72 08/17/2022    LDL 96 02/20/2020    TRIG 423 (H) 08/17/2022    TRIG 180 (H) 02/20/2020    CHOLHDLRATIO 4.9 05/15/2015       LIVER ENZYME RESULTS:  Lab Results   Component Value Date    AST 34 04/12/2023    AST 21 05/22/2021    ALT 31 04/12/2023    ALT 80 (H) 05/22/2021       CBC RESULTS:  Lab Results   Component Value Date    WBC 11.8 (H) 04/12/2023    WBC 10.0 05/22/2021    RBC 4.05 04/12/2023    RBC 4.70 05/22/2021    HGB 11.6 (L) 04/12/2023    HGB 12.4 05/22/2021    HCT 36.0 04/12/2023    HCT 38.9 05/22/2021    MCV 89 04/12/2023    MCV 83 05/22/2021    MCH 28.6 04/12/2023    MCH 26.4 (L) 05/22/2021    MCHC 32.2 04/12/2023    MCHC 31.9 05/22/2021    RDW 17.0 (H) 04/12/2023    RDW 16.8 (H) 05/22/2021     04/12/2023     " 05/22/2021       BMP RESULTS:  Lab Results   Component Value Date     10/10/2023     05/22/2021    POTASSIUM 4.5 10/10/2023    POTASSIUM 4.0 08/17/2022    POTASSIUM 3.6 05/22/2021    CHLORIDE 104 10/10/2023    CHLORIDE 103 08/17/2022    CHLORIDE 108 05/22/2021    CO2 23 10/10/2023    CO2 26 08/17/2022    CO2 26 05/22/2021    ANIONGAP 13 10/10/2023    ANIONGAP 10 08/17/2022    ANIONGAP 8 05/22/2021     (H) 10/10/2023     (H) 02/01/2023     (H) 08/17/2022    GLC 75 05/22/2021    BUN 18.9 10/10/2023    BUN 15 08/17/2022    BUN 14 05/22/2021    CR 1.33 (H) 10/10/2023    CR 1.01 05/22/2021    GFRESTIMATED 47 (L) 10/10/2023    GFRESTIMATED 64 05/22/2021    GFRESTBLACK 74 05/22/2021    GENE 9.1 10/10/2023    GENE 8.9 05/22/2021        A1C RESULTS:  Lab Results   Component Value Date    A1C 5.5 08/17/2022    A1C 5.6 10/16/2020       INR RESULTS:  Lab Results   Component Value Date    INR 0.99 11/20/2020    INR 0.94 09/29/2020           DENISE Morales, PA-C  [address] [place] [place] [address]

## 2023-11-21 ENCOUNTER — MYC MEDICAL ADVICE (OUTPATIENT)
Dept: CARDIOLOGY | Facility: CLINIC | Age: 53
End: 2023-11-21

## 2023-11-21 ENCOUNTER — TELEPHONE (OUTPATIENT)
Dept: CARDIOLOGY | Facility: CLINIC | Age: 53
End: 2023-11-21

## 2023-11-21 NOTE — TELEPHONE ENCOUNTER
Tjt message received below from patient. Patient was advised by pharmacist nebivolol can cause weight gain and patient is concerned with this as she is currently in wt mgmt program (please see below message for details). RN will send to Dr. Tate for further review and recommendation.            If I don't take that new pill that Dr Fonseca put me on then I won't be on any beta blocker at all she took me off of my I think it's amlodipine I'm thinking or it's the other one I can't remember but in another thing it causes high weight gain and I'm in weight management program so I don't know if that's going to work for me either so could somebody please get back to me thank you

## 2023-11-21 NOTE — TELEPHONE ENCOUNTER
Health Call Center    Phone Message    May a detailed message be left on voicemail: yes     Reason for Call: Medication Question or concern regarding medication   Prescription Clarification  Name of Medication: nebivolol (BYSTOLIC) 5 MG tablet   Prescribing Provider: Dr. Tate   [facility] [place] [facility] [place] [place] [facility] [address] [place] [address]     What on the order needs clarification? Pt pharmacy told patient to discuss taking this medication with her provider before taking. Please call pt back to discuss. Thank you      Action Taken: Message routed to:  Other: Cardiology    Travel Screening: Not Applicable      Thank you!  [facility]

## 2023-11-28 NOTE — TELEPHONE ENCOUNTER
Kassidy Tate, DO [place], Tomy Milligan, RN3 hours ago (8:42 AM)     CD  Bystolic is the least likely of all beta blockers to cause weight gain.       MyChart message returned to patient.  Marilyn Galvez, RN on 11/28/2023 at 11:49 AM

## 2023-12-01 DIAGNOSIS — K21.9 GASTROESOPHAGEAL REFLUX DISEASE WITHOUT ESOPHAGITIS: Primary | ICD-10-CM

## 2023-12-01 DIAGNOSIS — E11.9 TYPE 2 DIABETES MELLITUS WITHOUT COMPLICATION, WITHOUT LONG-TERM CURRENT USE OF INSULIN (H): ICD-10-CM

## 2023-12-02 RX ORDER — OMEPRAZOLE 40 MG/1
CAPSULE, DELAYED RELEASE ORAL
Qty: 60 CAPSULE | Refills: 2 | Status: SHIPPED | OUTPATIENT
Start: 2023-12-02 | End: 2024-02-23

## 2023-12-02 RX ORDER — ORAL SEMAGLUTIDE 14 MG/1
TABLET ORAL
Qty: 30 TABLET | Refills: 0 | Status: SHIPPED | OUTPATIENT
Start: 2023-12-02 | End: 2024-01-04

## 2023-12-04 DIAGNOSIS — E11.9 TYPE 2 DIABETES MELLITUS WITHOUT COMPLICATION, WITHOUT LONG-TERM CURRENT USE OF INSULIN (H): ICD-10-CM

## 2023-12-04 RX ORDER — LANCETS
EACH MISCELLANEOUS
Qty: 100 EACH | Refills: 1 | Status: SHIPPED | OUTPATIENT
Start: 2023-12-04 | End: 2024-06-04

## 2023-12-04 RX ORDER — BLOOD SUGAR DIAGNOSTIC
STRIP MISCELLANEOUS
Qty: 100 STRIP | Refills: 1 | Status: SHIPPED | OUTPATIENT
Start: 2023-12-04 | End: 2024-07-01

## 2023-12-07 NOTE — TELEPHONE ENCOUNTER
Prior Authorization Retail Medication Request     Medication/Dose: varenicline (CHANTIX) 0.5 AND 1 MG STARTER CHAPITO  ICD code (if different than what is on RX):  Cigarette nicotine dependence with nicotine-induced disorder   Previously Tried and Failed:  n/a  Rationale:  n/a     Insurance Name:  [employer] [facility] NON/[facility]   Insurance ID:  [id number]         Pharmacy Information (if different than what is on RX)  Name:  [facility]  Phone:  [phone]   No

## 2023-12-11 ENCOUNTER — TELEPHONE (OUTPATIENT)
Dept: INTERNAL MEDICINE | Facility: CLINIC | Age: 53
End: 2023-12-11

## 2023-12-11 DIAGNOSIS — E03.9 HYPOTHYROIDISM, UNSPECIFIED TYPE: ICD-10-CM

## 2023-12-11 DIAGNOSIS — J44.1 COPD EXACERBATION (H): ICD-10-CM

## 2023-12-11 RX ORDER — LEVOTHYROXINE SODIUM 50 UG/1
50 TABLET ORAL DAILY
Qty: 90 TABLET | Refills: 0 | Status: SHIPPED | OUTPATIENT
Start: 2023-12-11 | End: 2024-02-23

## 2023-12-11 RX ORDER — PREDNISONE 20 MG/1
40 TABLET ORAL DAILY
Qty: 10 TABLET | Refills: 0 | Status: SHIPPED | OUTPATIENT
Start: 2023-12-11 | End: 2024-03-11

## 2023-12-11 NOTE — TELEPHONE ENCOUNTER
Patient calling requesting prednisone, states she has had this prescribed by PCP in the past for breathing problems to prevent her from getting a URI. Previous prednisone rx not seen on med list.    If appropriate, please send rx to the [facility] pharmacy on file in [place].    Routing to covering provider.

## 2023-12-11 NOTE — TELEPHONE ENCOUNTER
Fax received from [facility] - Missed Visit 12/08/23 for review and signature.  Put in Dr. Low's in basket.

## 2023-12-12 ENCOUNTER — TELEPHONE (OUTPATIENT)
Dept: INTERNAL MEDICINE | Facility: CLINIC | Age: 53
End: 2023-12-12

## 2023-12-13 DIAGNOSIS — J44.9 COPD, MODERATE (H): ICD-10-CM

## 2023-12-13 RX ORDER — ALBUTEROL SULFATE 90 UG/1
AEROSOL, METERED RESPIRATORY (INHALATION)
Qty: 6.7 G | Refills: 0 | Status: SHIPPED | OUTPATIENT
Start: 2023-12-13 | End: 2024-01-02

## 2023-12-21 ENCOUNTER — TELEPHONE (OUTPATIENT)
Dept: INTERNAL MEDICINE | Facility: CLINIC | Age: 53
End: 2023-12-21

## 2023-12-21 NOTE — TELEPHONE ENCOUNTER
Fax received from [facility] - Missed Visit 12/15/23 for review and signature.  Put in Dr. Low's in basket.

## 2023-12-25 ENCOUNTER — MEDICAL CORRESPONDENCE (OUTPATIENT)
Dept: HEALTH INFORMATION MANAGEMENT | Facility: CLINIC | Age: 53
End: 2023-12-25

## 2024-01-02 ENCOUNTER — TELEPHONE (OUTPATIENT)
Dept: INTERNAL MEDICINE | Facility: CLINIC | Age: 54
End: 2024-01-02
Payer: COMMERCIAL

## 2024-01-02 DIAGNOSIS — J44.9 COPD, MODERATE (H): ICD-10-CM

## 2024-01-02 DIAGNOSIS — E11.9 TYPE 2 DIABETES MELLITUS WITHOUT COMPLICATION, WITHOUT LONG-TERM CURRENT USE OF INSULIN (H): ICD-10-CM

## 2024-01-02 RX ORDER — ALBUTEROL SULFATE 90 UG/1
AEROSOL, METERED RESPIRATORY (INHALATION)
Qty: 6.7 G | Refills: 0 | Status: SHIPPED | OUTPATIENT
Start: 2024-01-02 | End: 2024-02-03

## 2024-01-02 NOTE — TELEPHONE ENCOUNTER
Fax received from [facility] 12/25/23 for review and signature.  Put in Dr. Low's in basket.

## 2024-01-04 ENCOUNTER — MYC MEDICAL ADVICE (OUTPATIENT)
Dept: INTERNAL MEDICINE | Facility: CLINIC | Age: 54
End: 2024-01-04

## 2024-01-04 RX ORDER — ORAL SEMAGLUTIDE 14 MG/1
14 TABLET ORAL DAILY
Qty: 30 TABLET | Refills: 0 | Status: SHIPPED | OUTPATIENT
Start: 2024-01-04 | End: 2024-02-03

## 2024-01-09 ENCOUNTER — TELEPHONE (OUTPATIENT)
Dept: INTERNAL MEDICINE | Facility: CLINIC | Age: 54
End: 2024-01-09
Payer: COMMERCIAL

## 2024-01-10 NOTE — TELEPHONE ENCOUNTER
Retail Pharmacy Prior Authorization Team   Phone: [phone]    PA Initiation    Medication: Semaglutide (RYBELSUS) 14 MG tablet  Insurance Company: [employer] Non-Specialty PA's - Phone [phone] Fax [phone]  Pharmacy Filling the Rx: [facility], [place] - [place], [place] - [address] [place] [address]  Filling Pharmacy Phone: [phone]  Filling Pharmacy Fax: [phone]  Start Date: 1/10/2024

## 2024-01-10 NOTE — TELEPHONE ENCOUNTER
Patient called her insurance and they have not received a PA as of yet.  Patient is almost out of medication.  Please send PA as soon as possible.

## 2024-01-10 NOTE — TELEPHONE ENCOUNTER
Prior Authorization Approval    Authorization Effective Date: 12/11/2023  Authorization Expiration Date: 1/9/2025  Medication: Semaglutide (RYBELSUS) 14 MG tablet - APPROVED  Approved Dose/Quantity:    Reference #:     Insurance Company: [employer] Non-Specialty PA's - Phone [phone] Fax [phone]  Expected CoPay:       CoPay Card Available:      Foundation Assistance Needed:    Which Pharmacy is filling the prescription (Not needed for infusion/clinic administered): [facility] [place] [facility] [place] - [address] [place] [address]  Pharmacy Notified:  YES  Patient Notified:  YES  **Instructed pharmacy to notify patient when script is ready to /ship.**  ADVISED PATIENT OF THE APPROVAL.

## 2024-01-12 DIAGNOSIS — R73.03 PREDIABETES: ICD-10-CM

## 2024-01-12 DIAGNOSIS — Z71.6 ENCOUNTER FOR SMOKING CESSATION COUNSELING: ICD-10-CM

## 2024-01-12 RX ORDER — BUPROPION HYDROCHLORIDE 150 MG/1
TABLET ORAL
Qty: 90 TABLET | Refills: 2 | Status: SHIPPED | OUTPATIENT
Start: 2024-01-12

## 2024-01-13 DIAGNOSIS — J44.9 COPD, MODERATE (H): ICD-10-CM

## 2024-01-13 DIAGNOSIS — J30.89 DUST ALLERGY: ICD-10-CM

## 2024-01-15 ENCOUNTER — TELEPHONE (OUTPATIENT)
Dept: INTERNAL MEDICINE | Facility: CLINIC | Age: 54
End: 2024-01-15
Payer: COMMERCIAL

## 2024-01-15 DIAGNOSIS — R69 DIAGNOSIS UNKNOWN: Primary | ICD-10-CM

## 2024-01-15 RX ORDER — MONTELUKAST SODIUM 10 MG/1
TABLET ORAL
Qty: 90 TABLET | Refills: 1 | Status: SHIPPED | OUTPATIENT
Start: 2024-01-15 | End: 2024-08-06

## 2024-01-15 NOTE — TELEPHONE ENCOUNTER
Fax received from [facility] -  12/25/23 - 02/22/24 / POC [id number] for review and signature.  Put in Dr. Low's in basket.

## 2024-01-16 ENCOUNTER — TELEPHONE (OUTPATIENT)
Dept: UROLOGY | Facility: CLINIC | Age: 54
End: 2024-01-16
Payer: COMMERCIAL

## 2024-01-16 NOTE — TELEPHONE ENCOUNTER
[facility] Call Center    Phone Message    May a detailed message be left on voicemail: yes     Reason for Call: Other: Patient self referred for kidney stones - scheduled with Dr. Herndon on 1/24 - sending for review. Patient stated that she feels like she may be passing one again and is in pain. From the imaging that was done a couple weeks ago at [facility], she has several stones. Sending message for review and follow-up with Patient to discuss/schedule sooner appointment if possible. Thank you!     Action Taken: Other: [facility]    Travel Screening: Not Applicable

## 2024-01-18 ENCOUNTER — VIRTUAL VISIT (OUTPATIENT)
Dept: UROLOGY | Facility: CLINIC | Age: 54
End: 2024-01-18
Payer: COMMERCIAL

## 2024-01-18 DIAGNOSIS — N20.0 NEPHROLITHIASIS: ICD-10-CM

## 2024-01-18 DIAGNOSIS — N20.1 LEFT URETERAL CALCULUS: ICD-10-CM

## 2024-01-18 DIAGNOSIS — R10.9 ACUTE LEFT FLANK PAIN: Primary | ICD-10-CM

## 2024-01-18 PROCEDURE — 99214 OFFICE O/P EST MOD 30 MIN: CPT | Mod: 95 | Performed by: NURSE PRACTITIONER

## 2024-01-18 RX ORDER — DIMENHYDRINATE 50 MG
50 TABLET ORAL EVERY 6 HOURS PRN
Qty: 30 TABLET | Refills: 0 | Status: SHIPPED | OUTPATIENT
Start: 2024-01-18 | End: 2024-08-15

## 2024-01-18 ASSESSMENT — PAIN SCALES - GENERAL: PAINLEVEL: EXTREME PAIN (9)

## 2024-01-18 NOTE — PATIENT INSTRUCTIONS
UROLOGY CLINIC VISIT PATIENT INSTRUCTIONS        If you have any issues, questions or concerns in the meantime, do not hesitate to contact us at [facility] at [phone] or via [email or web address].     Berta Travis CNP  Department of Urology

## 2024-01-18 NOTE — NURSING NOTE
Is the patient currently in the state of [place]? YES    Visit mode:VIDEO    If the visit is dropped, the patient can be reconnected by: VIDEO VISIT: Text to cell phone:   Telephone Information:   Mobile [phone]       Will anyone else be joining the visit? NO  (If patient encounters technical issues they should call [phone]    How would you like to obtain your AVS? MyChart    Are changes needed to the allergy or medication list? No    Reason for visit: JANNIE OROZCO

## 2024-01-18 NOTE — PROGRESS NOTES
Urology Video Office Visit    Video-Visit Details    Type of service:  Video Visit    Video Start Time: 1057    Video End Time: 1106    Originating Location (pt. Location): Home    Distant Location (provider location):  Off-site     Platform used for Video Visit: Move Loot           Assessment and Plan:     Assessment:53 year old female with left flank pain, history of left ureteral stone.     Plan:  -Reviewed CT scan with patient. Noted left 4mm distal ureteral stone with a 3mm nonobstructing left lower pole stone.   -Recommend to obtain updating imaging for further evaluation of ongoing left flank pain. Pt amenable to plan of care.   -RTC after CT scan     Berta Travis CNP  Department of Urology  January 18, 2024    I spent a total of 35 minutes spent on the date of the encounter doing chart review, history and exam, documentation, and further activities as noted above.          Chief Complaint:   Left flank pain         History of Present Illness:    Swetha Patterson is a pleasant 53 year old female who presents with concerns of a left flank pain.     Ms. Patterson was seen in the ER on 12/30/23 for concerns of left flank pain with nausea.     CT Scan on 12/30/23 (images personally reviewed) revealed a left 4mm distal ureteral stone. Noted 2-3mm nonobstructing left lower pole stone . No noted right hydronephrosis or nephrolithiasis.     She notes after her ER visit she did have resolution of left flank pain however pain has returned over the last 4 days. She has not seen her stone pass as of yet. Notes that pain will also radiate to right side.     Denies any f/c however will be diaphoretic at times. Positive for intermittent nausea and had one bout of emesis yesterday. Able to tolerate fluids at this time. Denies any gross hematuria or dysuria.     She was placed on a course of Ceftin at the time of her ER visit.         Past Medical History:     Past Medical History:   Diagnosis Date    Benign essential  hypertension     Bipolar disorder (H)     Chronic abdominal pain     Chronic constipation     COPD (chronic obstructive pulmonary disease) (H)     Hypothyroidism     Impaired fasting glucose     Kidney stone     Obesity (BMI 30-39.9)     PONV (postoperative nausea and vomiting)     Pre-diabetes     Pure hypercholesterolemia             Past Surgical History:     Past Surgical History:   Procedure Laterality Date    ANESTHESIA OUT OF OR MRI N/A 10/07/2021    Procedure: ANESTHESIA OUT OF OR MRI;  Surgeon: GENERIC ANESTHESIA PROVIDER;  Location:  OR    ANESTHESIA OUT OF OR MRI N/A 5/11/2023    Procedure: MRI OF NECK WITH AND WITHOUT CONTRAST;  Surgeon: GENERIC ANESTHESIA PROVIDER;  Location:  OR    ANESTHESIA OUT OF OR MRI Left 6/26/2023    Procedure: Anesthesia out of OR MRI;  Surgeon: GENERIC ANESTHESIA PROVIDER;  Location:  OR    APPENDECTOMY      ARTHROSCOPY SHOULDER DECOMPRESSION Left 10/21/2015    Procedure: ARTHROSCOPY SHOULDER DECOMPRESSION;  Surgeon: Julien Milian MD;  Location: [facility] OR    BIOPSY ARTERY TEMPORAL Left 03/11/2020    Procedure: LEFT TEMPORAL ARTERY BIOPSY;  Surgeon: Ibeth Conner MD;  Location: [facility] OR    BRONCHIAL THERMOPLASTY N/A 11/14/2014    Procedure: BRONCHIAL THERMOPLASTY;  Surgeon: Ward Whitaker MD;  Location:  GI    BRONCHIAL THERMOPLASTY N/A 12/19/2014    Procedure: BRONCHIAL THERMOPLASTY;  Surgeon: Ward Whitaker MD;  Location: [facility] OR    BRONCHIAL THERMOPLASTY N/A 02/06/2015    Procedure: BRONCHIAL THERMOPLASTY;  Surgeon: Ward Whitaker MD;  Location: [facility] OR    COLONOSCOPY N/A 11/26/2018    Procedure: COLONOSCOPY ([facility]);  Surgeon: Marshall Oakes MD;  Location:  OR    COLONOSCOPY N/A 10/22/2020    Procedure: Colonoscopy;  Surgeon: Marshall Mccormick MD;  Location:  OR    COLONOSCOPY N/A 01/20/2023    Procedure: COLONOSCOPY, FLEXIBLE, WITH LESION REMOVAL USING SNARE;  Surgeon: Carson Domínguez MD;  Location:  GI    COMBINED  CYSTOSCOPY, RETROGRADES, URETEROSCOPY, LASER HOLMIUM LITHOTRIPSY URETER(S), INSERT STENT Right 7/19/2023    Procedure: Cystoscopy, Right Ureteroscopy, Right Retrograde Pyelogram;  Surgeon: Sammy Herndon MD;  Location: [facility] [place]    DISCECTOMY, FUSION CERVICAL ANTERIOR ONE LEVEL, COMBINED N/A 05/01/2018    Procedure: COMBINED DISCECTOMY, FUSION CERVICAL ANTERIOR ONE LEVEL;  1.  C5-C6 anterior cervical diskectomy and fusion.    2.  C5-C6 application of intervertebral biomechanical device for interbody fusion purposes.    3.  C5-C6 anterior instrumentation using the standard Kristin InViZia 24 mm plate with four associated bone screws, 12 mm in length.  Inferior screws are fixed.  Superior screws are va    ESOPHAGOSCOPY, GASTROSCOPY, DUODENOSCOPY (EGD), COMBINED N/A 10/22/2020    Procedure: Esophagoscopy, gastroscopy, duodenoscopy with biopsies;  Surgeon: Marshall Mccormick MD;  Location:  OR    ESOPHAGOSCOPY, GASTROSCOPY, DUODENOSCOPY (EGD), COMBINED N/A 06/17/2021    Procedure: ESOPHAGOGASTRODUODENOSCOPY, WITH BIOPSY;  Surgeon: Darrel Damon MD;  Location:  GI    EXCISE MASS NECK Left 02/01/2023    Procedure: exploration of  NECK left;  Surgeon: Ibeth Conner MD;  Location:  OR    EXCISE MASS TRUNK Left 11/03/2021    Procedure: EXCISION LEFT SHOULDER LIPOMA;  Surgeon: Iebth Conner MD;  Location:  OR    EXCISE NODE MEDIASTINAL  04/26/2013    Procedure: EXCISE NODE MEDIASTINAL;;  Surgeon: Av Peña MD;  Location:  OR    LAPAROSCOPIC CHOLECYSTECTOMY N/A 10/19/2017    Procedure: LAPAROSCOPIC CHOLECYSTECTOMY;  LAPAROSCOPIC CHOLECYSTECTOMY;  Surgeon: Ney Jerry MD;  Location:  OR    LARYNGOSCOPY, EXCISE VOCAL CORD LESION, COMBINED      THORACOSCOPY  04/26/2013    Procedure: THORACOSCOPY;  LEFT VIDEO ASSISTED THORACOSCOPY, RESECTION OF POSTERIOR MEDIASTINAL MASS;  Surgeon: Av Peña MD;  Location:  OR    TONSILLECTOMY & ADENOIDECTOMY               Medications     Current Outpatient Medications   Medication    acetaminophen (TYLENOL) 500 MG tablet    albuterol (PROAIR HFA/PROVENTIL HFA/VENTOLIN HFA) 108 (90 Base) MCG/ACT inhaler    albuterol (PROVENTIL) (2.5 MG/3ML) 0.083% neb solution    ALLERGY RELIEF CETIRIZINE 10 MG tablet    atorvastatin (LIPITOR) 80 MG tablet    benzoyl peroxide (ACNE-CLEAR) 10 % external gel    blood glucose (ACCU-CHEK ALLYSON PLUS) test strip    blood glucose (ACCU-CHEK GUIDE) test strip    blood glucose (NO BRAND SPECIFIED) lancets standard    blood glucose (NO BRAND SPECIFIED) lancets standard    blood glucose (NO BRAND SPECIFIED) test strip    blood glucose monitoring (NO BRAND SPECIFIED) meter device kit    blood glucose monitoring (SOFTCLIX) lancets    budesonide-formoterol (SYMBICORT) 160-4.5 MCG/ACT Inhaler    buPROPion (WELLBUTRIN XL) 150 MG 24 hr tablet    clindamycin (CLINDAMAX) 1 % external gel    diazepam (VALIUM) 10 MG tablet    dimenhyDRINATE (DRAMAMINE) 50 MG tablet    Emollient (CERAVE MOISTURIZING) CREA    eszopiclone (LUNESTA) 2 MG tablet    furosemide (LASIX) 20 MG tablet    gabapentin (NEURONTIN) 100 MG capsule    hydrocortisone 2.5 % cream    hydrOXYzine (ATARAX) 50 MG tablet    ibuprofen (ADVIL/MOTRIN) 200 MG tablet    lamoTRIgine (LAMICTAL) 150 MG tablet    lamoTRIgine (LAMICTAL) 200 MG tablet    levothyroxine (SYNTHROID/LEVOTHROID) 50 MCG tablet    lisinopril (ZESTRIL) 10 MG tablet    metFORMIN (GLUCOPHAGE) 500 MG tablet    montelukast (SINGULAIR) 10 MG tablet    multivitamin, therapeutic (THERA-VIT) TABS tablet    naloxone (NARCAN) 4 MG/0.1ML nasal spray    nebivolol (BYSTOLIC) 5 MG tablet    nitroGLYcerin (NITROSTAT) 0.4 MG sublingual tablet    nystatin (MYCOSTATIN) 890174 UNIT/GM external powder    omeprazole (PRILOSEC) 20 MG DR capsule    omeprazole (PRILOSEC) 40 MG DR capsule    ondansetron (ZOFRAN ODT) 4 MG ODT tab    polyethylene glycol (MIRALAX) 17 GM/Dose powder    predniSONE (DELTASONE) 20 MG tablet     Semaglutide (RYBELSUS) 14 MG tablet    senna-docusate (SENOKOT-S/PERICOLACE) 8.6-50 MG tablet    sucralfate (CARAFATE) 1 GM tablet    sulfamethoxazole-trimethoprim (BACTRIM DS) 800-160 MG tablet    terbinafine (LAMISIL) 1 % external cream    triamcinolone (KENALOG) 0.025 % external ointment    Vitamin D3 (CHOLECALCIFEROL) 125 MCG (5000 UT) tablet    VRAYLAR 6 MG capsule     No current facility-administered medications for this visit.            Family History:     Family History   Problem Relation Age of Onset    Myocardial Infarction Mother     Hypertension Mother     Cerebrovascular Disease Mother             Social History:     Social History     Socioeconomic History    Marital status:      Spouse name: Not on file    Number of children: Not on file    Years of education: Not on file    Highest education level: Not on file   Occupational History    Not on file   Tobacco Use    Smoking status: Every Day     Packs/day: 0.30     Years: 30.00     Additional pack years: 0.00     Total pack years: 9.00     Types: Cigarettes     Passive exposure: Past    Smokeless tobacco: Never   Vaping Use    Vaping Use: Never used   Substance and Sexual Activity    Alcohol use: Not Currently    Drug use: No    Sexual activity: Not Currently   Other Topics Concern     Service Not Asked    Blood Transfusions Not Asked    Caffeine Concern No     Comment: 4-5 cans of pop daily    Occupational Exposure Not Asked    Hobby Hazards Not Asked    Sleep Concern Not Asked    Stress Concern Not Asked    Weight Concern Not Asked    Special Diet No    Back Care Not Asked    Exercise No     Comment: on hold    Bike Helmet Not Asked    Seat Belt Not Asked    Self-Exams Not Asked    Parent/sibling w/ CABG, MI or angioplasty before 65F 55M? Yes   Social History Narrative    Not on file     Social Determinants of Health     Financial Resource Strain: Low Risk  (10/13/2023)    Financial Resource Strain     Within the past 12 months, have  you or your family members you live with been unable to get utilities (heat, electricity) when it was really needed?: No   Food Insecurity: High Risk (10/13/2023)    Food Insecurity     Within the past 12 months, did you worry that your food would run out before you got money to buy more?: Yes     Within the past 12 months, did the food you bought just not last and you didn t have money to get more?: Yes   Transportation Needs: High Risk (10/13/2023)    Transportation Needs     Within the past 12 months, has lack of transportation kept you from medical appointments, getting your medicines, non-medical meetings or appointments, work, or from getting things that you need?: Yes   Physical Activity: Not on file   Stress: Not on file   Social Connections: Not on file   Interpersonal Safety: Low Risk  (10/18/2023)    Interpersonal Safety     Do you feel physically and emotionally safe where you currently live?: Yes     Within the past 12 months, have you been hit, slapped, kicked or otherwise physically hurt by someone?: No     Within the past 12 months, have you been humiliated or emotionally abused in other ways by your partner or ex-partner?: No   Housing Stability: Low Risk  (10/13/2023)    Housing Stability     Do you have housing? : Yes     Are you worried about losing your housing?: No            Allergies:   Codeine, Cyclobenzaprine, and Hydrocodone         Review of Systems:  From intake questionnaire   Negative 14 system review except as noted on HPI, nurse's note.         Physical Exam:   General Appearance: Well groomed, hygenic  Eyes: No redness, discharge  Respiratory: No cough, no respiratory distress or labored breathing  Musculoskeletal: Grossly normal, full range of motion in upper extremities, no gross deficits  Skin: No discoloration or apparent rashes  Neurologic - No tremors  Psychiatric - Alert and oriented  The rest of a comprehensive physical examination is deferred due to video visit  restrictions        Labs:    I personally reviewed all applicable laboratory data and went over findings with patient  Significant for:    CBC RESULTS:  Recent Labs   Lab Test 04/12/23  0251 12/23/22 1937 08/17/22  0929 01/24/22  1403   WBC 11.8* 9.0 8.2 6.7   HGB 11.6* 11.7 13.1 13.9    297 339 379        BMP RESULTS:  Recent Labs   Lab Test 10/10/23  1447 04/12/23  0251 02/01/23  1559 02/01/23  1414 02/01/23  1107 02/01/23  0947 12/23/22  1937 08/17/22  0929 09/30/21  1203 05/22/21  0151 05/12/21  1733 11/21/20  0628 11/20/20  0622    140  --   --   --   --  138 139   < > 142 136 142 136   POTASSIUM 4.5 3.9  --   --  4.8  --  4.2 4.0   < > 3.6 3.8 4.0 4.3   CHLORIDE 104 103  --   --   --   --  101 103   < > 108 105 112* 107   CO2 23 24  --   --   --   --  24 26   < > 26 26 26 24   ANIONGAP 13 13  --   --   --   --  13 10   < > 8 5 4 5   * 122* 116*  --   --  110* 104* 120*   < > 75 78 101* 113*   BUN 18.9 15.3  --   --   --   --  17.4 15   < > 14 24 13 10   CR 1.33* 0.81  --  1.08*  --   --  0.96* 0.80   < > 1.01 0.98 1.05* 0.91   GFRESTIMATED 47* 86  --  61  --   --  70 88   < > 64 66 61 73   GFRESTBLACK  --   --   --   --   --   --   --   --   --  74 77 71 84   GENE 9.1 9.0  --   --   --   --  9.2 9.9   < > 8.9 8.6 8.2* 8.7    < > = values in this interval not displayed.       UA RESULTS:   Recent Labs   Lab Test 12/23/22 1953 11/20/20  0803 06/28/18  1226   SG 1.011 1.015 >1.030   URINEPH 5.5 5.5 5.5   NITRITE Negative Negative Negative   RBCU 3* <1 O - 2   WBCU 24* 1 0 - 5       CALCIUM RESULTS  Lab Results   Component Value Date    GENE 9.1 10/10/2023    GENE 9.0 04/12/2023    GENE 9.2 12/23/2022    GENE 8.9 05/22/2021    GENE 8.6 05/12/2021    GENE 8.2 11/21/2020           Imaging:    I personally reviewed all applicable imaging and went over the below findings with patient.    Results for orders placed or performed during the hospital encounter of 10/12/23   CT Abdomen Pelvis w Contrast     Narrative    CT ABDOMEN AND PELVIS WITH CONTRAST 10/12/2023 9:07 AM    CLINICAL HISTORY: Generalized abdominal pain and constipation.     TECHNIQUE: CT scan of the abdomen and pelvis was performed following  injection of IV contrast. Multiplanar reformats were obtained. Dose  reduction techniques were used.  CONTRAST: 107 mL [id number]    COMPARISON: CT abdomen and pelvis 5/12/2021.    FINDINGS:   LOWER CHEST: Unremarkable.    HEPATOBILIARY: No significant mass or bile duct dilatation. No  calcified gallstones.     PANCREAS: No significant mass, duct dilatation, or inflammatory  change.    SPLEEN: Normal size.    ADRENAL GLANDS: No significant nodules.    KIDNEYS/BLADDER: Nonobstructing left renal calculi. Previously  visualized right lower pole calculus has resolved. No collecting  system dilatation. Urinary bladder is unremarkable.    BOWEL: No obstruction or inflammatory change. Prior appendectomy.    PELVIC ORGANS: Uterus is present. Similar and likely benign left  adnexal/ovarian cyst (2.2 cm). No adnexal mass.    ADDITIONAL FINDINGS: No ascites or organized fluid collection.  Nonaneurysmal abdominal aorta with moderate atherosclerosis. No  enlarged lymph node. Portal veins are patent.    MUSCULOSKELETAL: No aggressive osseous lesion. Minimal degenerative  changes of the lumbar spine.      Impression    IMPRESSION:   1.  No convincing acute process within the abdomen or pelvis.  2.  Nonobstructing left renal calculi.    SAMUEL RING MD         SYSTEM ID:  [id number]     *Note: Due to a large number of results and/or encounters for the requested time period, some results have not been displayed. A complete set of results can be found in Results Review.

## 2024-01-19 ENCOUNTER — TELEPHONE (OUTPATIENT)
Dept: INTERNAL MEDICINE | Facility: CLINIC | Age: 54
End: 2024-01-19

## 2024-01-19 ENCOUNTER — VIRTUAL VISIT (OUTPATIENT)
Dept: UROLOGY | Facility: CLINIC | Age: 54
End: 2024-01-19
Payer: COMMERCIAL

## 2024-01-19 ENCOUNTER — HOSPITAL ENCOUNTER (OUTPATIENT)
Dept: CT IMAGING | Facility: CLINIC | Age: 54
Discharge: HOME | End: 2024-01-19
Attending: NURSE PRACTITIONER | Admitting: NURSE PRACTITIONER
Payer: COMMERCIAL

## 2024-01-19 DIAGNOSIS — R10.9 ACUTE LEFT FLANK PAIN: ICD-10-CM

## 2024-01-19 DIAGNOSIS — N20.1 CALCULUS OF DISTAL LEFT URETER: Primary | ICD-10-CM

## 2024-01-19 PROCEDURE — 74176 CT ABD & PELVIS W/O CONTRAST: CPT

## 2024-01-19 PROCEDURE — 99214 OFFICE O/P EST MOD 30 MIN: CPT | Mod: 95 | Performed by: NURSE PRACTITIONER

## 2024-01-19 RX ORDER — OXYCODONE HYDROCHLORIDE 5 MG/1
5 TABLET ORAL EVERY 6 HOURS PRN
Qty: 10 TABLET | Refills: 0 | Status: SHIPPED | OUTPATIENT
Start: 2024-01-19 | End: 2024-01-22

## 2024-01-19 RX ORDER — TAMSULOSIN HYDROCHLORIDE 0.4 MG/1
0.4 CAPSULE ORAL DAILY
Qty: 30 CAPSULE | Refills: 0 | Status: SHIPPED | OUTPATIENT
Start: 2024-01-19 | End: 2024-07-10

## 2024-01-19 ASSESSMENT — PAIN SCALES - GENERAL: PAINLEVEL: EXTREME PAIN (8)

## 2024-01-19 NOTE — NURSING NOTE
Pt experiencing pain in lower back, left side at a pain level of 8. Pt stated she felt pain during check-in/rooming as being very high    Is the patient currently in the state of [place]? YES    Visit mode:VIDEO    If the visit is dropped, the patient can be reconnected by: VIDEO VISIT: Text to cell phone:   Telephone Information:   Mobile [phone]       Will anyone else be joining the visit? NO  (If patient encounters technical issues they should call [phone])    How would you like to obtain your AVS? MyChart    Are changes needed to the allergy or medication list? Pt stated no changes to allergies and Pt declined med review, pt stated no new changes    Reason for visit: RECHECK (F/u with new CT sameday)    Sarah Patterson VVF

## 2024-01-19 NOTE — PROGRESS NOTES
Urology Video Office Visit    Video-Visit Details    Type of service:  Video Visit    Video Start Time: 1244    Video End Time: 1257    Originating Location (pt. Location): Home    Distant Location (provider location):  Off-site     Platform used for Video Visit: [employer]           Assessment and Plan:     Assessment:53 year old female with a left 5mm distal ureteral stone    Plan:  -Reviewed CT scan with patient. Noted unchanged position of left 5mm distal ureteral stone.   -We discussed treatment options including observation with MET x 3-4 weeks vs. ureteroscopy and laser lithotripsy. I counseled the patient regarding the potential need for a ureteral stent after treatment and the necessity of removing the stent after surgery. I also discussed the possibility of additional procedures to render the patient stone free.  After discussing risks, benefits, and alternatives to treatment, patient elects to proceed with left URS/LL.  -Recommend to use acetaminophen, ibuprofen, dimenhydrinate, and oxycodone PRN for pain control. Will start on tamsulosin 0.4mg PO daily.   -If having severe flank pain, fevers, chills, nausea, or vomiting please notify Urology clinic or be seen in the ER.     Berta Travis CNP  Department of Urology  January 19, 2024    I spent a total of 30 minutes spent on the date of the encounter doing chart review, history and exam, documentation, and further activities as noted above.          Chief Complaint:   Left Distal Ureteral stone         History of Present Illness:    Swetha Patterson is a pleasant 53 year old female who presents with concerns of a left flank pain. PMHx: HTN, COPD, Hypothyroidism, Bipolar disorder, and current tobacco use.     Ms. Patterson was recently seen on 1/18/24 for reoccurring left flank pain with nausea.     Updated CT scan on 1/19/24 (images personally reviewed) revealed noted left 5mm distal ureteral stone without hydronephrosis. Noted unchanged position when  compared to CT scan on 12/30/23.     She states she continues to have severe left flank pain about an 8/10. Is using acetaminophen, ibuprofen, and oxycodone PRN to help with pain control. Denies any f/c/n/v, gross hematuria, or dysuria. Has noted diaphoresis at night.      She was initially seen in the ER on 12/30/23 for concerns of left flank pain with nausea. CT Scan on 12/30/23 (images personally reviewed) revealed a left 4mm distal ureteral stone. Noted 2-3mm nonobstructing left lower pole stone . No noted right hydronephrosis or nephrolithiasis.      She notes after her ER visit she did have resolution of left flank pain however pain has returned over the last 4 days. She did not see her stone pass from 12/30/23.     History of a right ureteroscopy in July 2023 for concerns of a right ureteral stone. Ureteroscopy did not reveal any calculus in the kidney, ureter, or bladder at time of ureteroscopy.          Past Medical History:     Past Medical History:   Diagnosis Date    Benign essential hypertension     Bipolar disorder (H)     Chronic abdominal pain     Chronic constipation     COPD (chronic obstructive pulmonary disease) (H)     Hypothyroidism     Impaired fasting glucose     Kidney stone     Obesity (BMI 30-39.9)     PONV (postoperative nausea and vomiting)     Pre-diabetes     Pure hypercholesterolemia             Past Surgical History:     Past Surgical History:   Procedure Laterality Date    ANESTHESIA OUT OF OR MRI N/A 10/07/2021    Procedure: ANESTHESIA OUT OF OR MRI;  Surgeon: GENERIC ANESTHESIA PROVIDER;  Location:  OR    ANESTHESIA OUT OF OR MRI N/A 5/11/2023    Procedure: MRI OF NECK WITH AND WITHOUT CONTRAST;  Surgeon: GENERIC ANESTHESIA PROVIDER;  Location:  OR    ANESTHESIA OUT OF OR MRI Left 6/26/2023    Procedure: Anesthesia out of OR MRI;  Surgeon: GENERIC ANESTHESIA PROVIDER;  Location:  OR    APPENDECTOMY      ARTHROSCOPY SHOULDER DECOMPRESSION Left 10/21/2015    Procedure:  ARTHROSCOPY SHOULDER DECOMPRESSION;  Surgeon: Julien Milian MD;  Location: [facility]    BIOPSY ARTERY TEMPORAL Left 03/11/2020    Procedure: LEFT TEMPORAL ARTERY BIOPSY;  Surgeon: Ibeth Conner MD;  Location: [facility] OR    BRONCHIAL THERMOPLASTY N/A 11/14/2014    Procedure: BRONCHIAL THERMOPLASTY;  Surgeon: Ward Whitaker MD;  [facility]: [facility]    BRONCHIAL THERMOPLASTY N/A 12/19/2014    Procedure: BRONCHIAL THERMOPLASTY;  Surgeon: Ward Whitaker MD;  [facility]: [facility]    BRONCHIAL THERMOPLASTY N/A 02/06/2015    Procedure: BRONCHIAL THERMOPLASTY;  Surgeon: Ward Whitaker MD;  [facility]    COLONOSCOPY N/A 11/26/2018    Procedure: COLONOSCOPY ([facility]);  Surgeon: Marsahll Oakes MD;  Location:  OR    COLONOSCOPY N/A 10/22/2020    Procedure: Colonoscopy;  Surgeon: Marshall Mccormick MD;  Location:  OR    COLONOSCOPY N/A 01/20/2023    Procedure: COLONOSCOPY, FLEXIBLE, WITH LESION REMOVAL USING SNARE;  Surgeon: Carson Domínguez MD;  Location:  GI    COMBINED CYSTOSCOPY, RETROGRADES, URETEROSCOPY, LASER HOLMIUM LITHOTRIPSY URETER(S), INSERT STENT Right 7/19/2023    Procedure: Cystoscopy, Right Ureteroscopy, Right Retrograde Pyelogram;  Surgeon: Sammy Herndon MD;  Location: [facility] OR    DISCECTOMY, FUSION CERVICAL ANTERIOR ONE LEVEL, COMBINED N/A 05/01/2018    Procedure: COMBINED DISCECTOMY, FUSION CERVICAL ANTERIOR ONE LEVEL;  1.  C5-C6 anterior cervical diskectomy and fusion.    2.  C5-C6 application of intervertebral biomechanical device for interbody fusion purposes.    3.  C5-C6 anterior instrumentation using the standard Kristin InViZia 24 mm plate with four associated bone screws, 12 mm in length.  Inferior screws are fixed.  Superior screws are va    ESOPHAGOSCOPY, GASTROSCOPY, DUODENOSCOPY (EGD), COMBINED N/A 10/22/2020    Procedure: Esophagoscopy, gastroscopy, duodenoscopy with biopsies;  Surgeon: Marshall Mccormick MD;  Location:  OR    ESOPHAGOSCOPY,  GASTROSCOPY, DUODENOSCOPY (EGD), COMBINED N/A 06/17/2021    Procedure: ESOPHAGOGASTRODUODENOSCOPY, WITH BIOPSY;  Surgeon: Darrel Damon MD;  Location: [facility] GI    EXCISE MASS NECK Left 02/01/2023    Procedure: exploration of  NECK left;  Surgeon: Ibeth Conner MD;  Location: [facility] OR    EXCISE MASS TRUNK Left 11/03/2021    Procedure: EXCISION LEFT SHOULDER LIPOMA;  Surgeon: Ibeth Conner MD;  Location: [facility] OR    EXCISE NODE MEDIASTINAL  04/26/2013    Procedure: EXCISE NODE MEDIASTINAL;;  Surgeon: Av Peña MD;  Location: [facility] OR    LAPAROSCOPIC CHOLECYSTECTOMY N/A 10/19/2017    Procedure: LAPAROSCOPIC CHOLECYSTECTOMY;  LAPAROSCOPIC CHOLECYSTECTOMY;  Surgeon: Ney Jerry MD;  Location: [facility] OR    LARYNGOSCOPY, EXCISE VOCAL CORD LESION, COMBINED      THORACOSCOPY  04/26/2013    Procedure: THORACOSCOPY;  LEFT VIDEO ASSISTED THORACOSCOPY, RESECTION OF POSTERIOR MEDIASTINAL MASS;  Surgeon: Av Peña MD;  Location: [facility] OR    TONSILLECTOMY & ADENOIDECTOMY              Medications     Current Outpatient Medications   Medication    acetaminophen (TYLENOL) 500 MG tablet    albuterol (PROAIR HFA/PROVENTIL HFA/VENTOLIN HFA) 108 (90 Base) MCG/ACT inhaler    albuterol (PROVENTIL) (2.5 MG/3ML) 0.083% neb solution    ALLERGY RELIEF CETIRIZINE 10 MG tablet    atorvastatin (LIPITOR) 80 MG tablet    benzoyl peroxide (ACNE-CLEAR) 10 % external gel    blood glucose (ACCU-CHEK ALLYSON PLUS) test strip    blood glucose (ACCU-CHEK GUIDE) test strip    blood glucose (NO BRAND SPECIFIED) lancets standard    blood glucose (NO BRAND SPECIFIED) lancets standard    blood glucose (NO BRAND SPECIFIED) test strip    blood glucose monitoring (NO BRAND SPECIFIED) meter device kit    blood glucose monitoring (SOFTCLIX) lancets    budesonide-formoterol (SYMBICORT) 160-4.5 MCG/ACT Inhaler    buPROPion (WELLBUTRIN XL) 150 MG 24 hr tablet    clindamycin (CLINDAMAX) 1 % external gel    diazepam (VALIUM) 10 MG tablet     dimenhyDRINATE (DRAMAMINE) 50 MG tablet    dimenhyDRINATE (DRAMAMINE) 50 MG tablet    Emollient (CERAVE MOISTURIZING) CREA    eszopiclone (LUNESTA) 2 MG tablet    furosemide (LASIX) 20 MG tablet    gabapentin (NEURONTIN) 100 MG capsule    hydrocortisone 2.5 % cream    hydrOXYzine (ATARAX) 50 MG tablet    ibuprofen (ADVIL/MOTRIN) 200 MG tablet    lamoTRIgine (LAMICTAL) 150 MG tablet    lamoTRIgine (LAMICTAL) 200 MG tablet    levothyroxine (SYNTHROID/LEVOTHROID) 50 MCG tablet    lisinopril (ZESTRIL) 10 MG tablet    metFORMIN (GLUCOPHAGE) 500 MG tablet    montelukast (SINGULAIR) 10 MG tablet    multivitamin, therapeutic (THERA-VIT) TABS tablet    naloxone (NARCAN) 4 MG/0.1ML nasal spray    nebivolol (BYSTOLIC) 5 MG tablet    nitroGLYcerin (NITROSTAT) 0.4 MG sublingual tablet    nystatin (MYCOSTATIN) 751955 UNIT/GM external powder    omeprazole (PRILOSEC) 20 MG DR capsule    omeprazole (PRILOSEC) 40 MG DR capsule    ondansetron (ZOFRAN ODT) 4 MG ODT tab    polyethylene glycol (MIRALAX) 17 GM/Dose powder    predniSONE (DELTASONE) 20 MG tablet    Semaglutide (RYBELSUS) 14 MG tablet    senna-docusate (SENOKOT-S/PERICOLACE) 8.6-50 MG tablet    sucralfate (CARAFATE) 1 GM tablet    sulfamethoxazole-trimethoprim (BACTRIM DS) 800-160 MG tablet    terbinafine (LAMISIL) 1 % external cream    triamcinolone (KENALOG) 0.025 % external ointment    Vitamin D3 (CHOLECALCIFEROL) 125 MCG (5000 UT) tablet    VRAYLAR 6 MG capsule     No current facility-administered medications for this visit.            Family History:     Family History   Problem Relation Age of Onset    Myocardial Infarction Mother     Hypertension Mother     Cerebrovascular Disease Mother             Social History:     Social History     Socioeconomic History    Marital status:      Spouse name: Not on file    Number of children: Not on file    Years of education: Not on file    Highest education level: Not on file   Occupational History    Not on file    Tobacco Use    Smoking status: Every Day     Packs/day: 0.30     Years: 30.00     Additional pack years: 0.00     Total pack years: 9.00     Types: Cigarettes     Passive exposure: Past    Smokeless tobacco: Never   Vaping Use    Vaping Use: Never used   Substance and Sexual Activity    Alcohol use: Not Currently    Drug use: No    Sexual activity: Not Currently   Other Topics Concern     Service Not Asked    Blood Transfusions Not Asked    Caffeine Concern No     Comment: 4-5 cans of pop daily    Occupational Exposure Not Asked    Hobby Hazards Not Asked    Sleep Concern Not Asked    Stress Concern Not Asked    Weight Concern Not Asked    Special Diet No    Back Care Not Asked    Exercise No     Comment: on hold    Bike Helmet Not Asked    Seat Belt Not Asked    Self-Exams Not Asked    Parent/sibling w/ CABG, MI or angioplasty before 65F 55M? Yes   Social History Narrative    Not on file     Social Determinants of Health     Financial Resource Strain: Low Risk  (10/13/2023)    Financial Resource Strain     Within the past 12 months, have you or your family members you live with been unable to get utilities (heat, electricity) when it was really needed?: No   Food Insecurity: High Risk (10/13/2023)    Food Insecurity     Within the past 12 months, did you worry that your food would run out before you got money to buy more?: Yes     Within the past 12 months, did the food you bought just not last and you didn t have money to get more?: Yes   Transportation Needs: High Risk (10/13/2023)    Transportation Needs     Within the past 12 months, has lack of transportation kept you from medical appointments, getting your medicines, non-medical meetings or appointments, work, or from getting things that you need?: Yes   Physical Activity: Not on file   Stress: Not on file   Social Connections: Not on file   Interpersonal Safety: Low Risk  (10/18/2023)    Interpersonal Safety     Do you feel physically and  emotionally safe where you currently live?: Yes     Within the past 12 months, have you been hit, slapped, kicked or otherwise physically hurt by someone?: No     Within the past 12 months, have you been humiliated or emotionally abused in other ways by your partner or ex-partner?: No   Housing Stability: Low Risk  (10/13/2023)    Housing Stability     Do you have housing? : Yes     Are you worried about losing your housing?: No            Allergies:   Codeine, Cyclobenzaprine, and Hydrocodone         Review of Systems:  From intake questionnaire   Negative 14 system review except as noted on HPI, nurse's note.         Physical Exam:   General Appearance: Well groomed, hygenic  Eyes: No redness, discharge  Respiratory: No cough, no respiratory distress or labored breathing  Musculoskeletal: Grossly normal, full range of motion in upper extremities, no gross deficits  Skin: No discoloration or apparent rashes  Neurologic - No tremors  Psychiatric - Alert and oriented  The rest of a comprehensive physical examination is deferred due to video visit restrictions        Labs:    I personally reviewed all applicable laboratory data and went over findings with patient  Significant for:    CBC RESULTS:  Recent Labs   Lab Test 04/12/23  0251 12/23/22  1937 08/17/22  0929 01/24/22  1403   WBC 11.8* 9.0 8.2 6.7   HGB 11.6* 11.7 13.1 13.9    297 339 379        BMP RESULTS:  Recent Labs   Lab Test 10/10/23  1447 04/12/23  0251 02/01/23  1559 02/01/23  1414 02/01/23  1107 02/01/23  0947 12/23/22  1937 08/17/22  0929 09/30/21  1203 05/22/21  0151 05/12/21  1733 11/21/20  0628 11/20/20  0622    140  --   --   --   --  138 139   < > 142 136 142 136   POTASSIUM 4.5 3.9  --   --  4.8  --  4.2 4.0   < > 3.6 3.8 4.0 4.3   CHLORIDE 104 103  --   --   --   --  101 103   < > 108 105 112* 107   CO2 23 24  --   --   --   --  24 26   < > 26 26 26 24   ANIONGAP 13 13  --   --   --   --  13 10   < > 8 5 4 5   * 122* 116*  --    --  110* 104* 120*   < > 75 78 101* 113*   BUN 18.9 15.3  --   --   --   --  17.4 15   < > 14 24 13 10   CR 1.33* 0.81  --  1.08*  --   --  0.96* 0.80   < > 1.01 0.98 1.05* 0.91   GFRESTIMATED 47* 86  --  61  --   --  70 88   < > 64 66 61 73   GFRESTBLACK  --   --   --   --   --   --   --   --   --  74 77 71 84   GENE 9.1 9.0  --   --   --   --  9.2 9.9   < > 8.9 8.6 8.2* 8.7    < > = values in this interval not displayed.       UA RESULTS:   Recent Labs   Lab Test 12/23/22 1953 11/20/20  0803 06/28/18  1226   SG 1.011 1.015 >1.030   URINEPH 5.5 5.5 5.5   NITRITE Negative Negative Negative   RBCU 3* <1 O - 2   WBCU 24* 1 0 - 5       CALCIUM RESULTS  Lab Results   Component Value Date    GENE 9.1 10/10/2023    GENE 9.0 04/12/2023    GENE 9.2 12/23/2022    GENE 8.9 05/22/2021    GENE 8.6 05/12/2021    GENE 8.2 11/21/2020           Imaging:    I personally reviewed all applicable imaging and went over the below findings with patient.    Results for orders placed or performed during the hospital encounter of 01/19/24   CT Abdomen Pelvis w/o Contrast    Narrative    CT ABDOMEN PELVIS WITHOUT CONTRAST 1/19/2024 8:36 AM    CLINICAL HISTORY: Acute left flank pain.    TECHNIQUE: CT scan of the abdomen and pelvis was performed without IV  contrast. Multiplanar reformats were obtained. Dose reduction  techniques were used.  CONTRAST: None.    COMPARISON: 10/12/2023    FINDINGS:   LOWER CHEST: Normal.    HEPATOBILIARY: Cholecystectomy.    PANCREAS: Normal.    SPLEEN: Normal.    ADRENAL GLANDS: Normal.    KIDNEYS/BLADDER: 3 mm nonobstructing left renal calculus. No  hydronephrosis on either side. There is a calculus in the distal left  ureter measuring 4 x 3 x 5 mm. No right-sided calculi.    BOWEL: Normal.    LYMPH NODES: Normal.    VASCULATURE: Unremarkable.    PELVIC ORGANS: Normal.    OTHER: None.    MUSCULOSKELETAL: Normal.      Impression    IMPRESSION:   1.  5 mm calculus in the distal left ureter, without  current  hydronephrosis.  2.  Additional 3 mm nonobstructing left renal calculus.      *Note: Due to a large number of results and/or encounters for the requested time period, some results have not been displayed. A complete set of results can be found in Results Review.

## 2024-01-19 NOTE — TELEPHONE ENCOUNTER
Patient calling stating she is having problems with kidney stones.  She had a virtual appointment with urology and forgot to ask if she  can take Flomax and Furosemide together. Please advise, thanks.     She will be going to surgery where they can get her in the soonest.       Discussed pain control and to alternate tylenol with ibuprofen and take oxycodone in between for breakthrough pain.  Patient verbalized understanding.

## 2024-01-19 NOTE — PATIENT INSTRUCTIONS
UROLOGY CLINIC VISIT PATIENT INSTRUCTIONS    -Please refer to the following link for information about Dr. Carbajal and to learn about the ureteroscopy procedure:    [email or web address]  [email or web address]     If you have any issues, questions or concerns in the meantime, do not hesitate to contact us at [facility] at [phone] or via [employer].     Berta Travis CNP  Department of Urology       Medicines to Control Your Kidney Stone Symptoms    Control Pain: First Line Treatment    Dramamine (Please use the drowsy version, nongeneric formulation)  Available over the counter  **This medicine will cause increased drowsiness. DON T DRIVE OR OPERATE MACHINERY FOR 6 HOURS**    How to take:   Take 50 mg at bedtime every night until the stone passes  In addition, take 50 mg every 6 hours as needed    What it does:  Decreases spasm of the ureter  Decreases recurrence of pain for next 24 hours  Decreases severe pain  Decreases nausea  Will help you sleep    Ibuprofen (Advil or Motrin)  Available over the counter  **Please do not take if advise to avoid NSAIDS, history of stomach ulcers/bleeding issues, blood thinners, or already on NSAIDS**    How to take:   Take 2 to 4 (200 mg) tablets every 6 hours for the first 48 hours. After that, use only as needed    What it does:  Decreases pain  Prevents spasm of the ureter    Acetaminophen (Tylenol)   Available over the counter    How to Take:  Take 2 (500 mg) tablets every 6 hours as needed. Do not exceed 8 tablets (4,000 mg total) in 24 hours    What it does:  Highly effective in controlling pain      Control pain: second line treatment (if you still have severe pain 1 hour after trying all of the above)    Narcotics (oxycodone)     How to take   Take 1 to 2 tablets every 6 hours as needed    Narcotics have major side effects:   Confusion, disorientation and sleepiness. DO NOT DRIVE OR OPERATE MACHINERY WITHIN 24  HOURS.   Nausea. Take Dramamine, Zofran or Haldol to help control this.   May cause constipation (hard, dry stools). Start over-the-counter Miralax (1/2 to 1 capful) as needed if experiencing constipation.   Trouble sleeping    Other medicines we may give you:    Tamsulosin (Flomax): Take 0.4 mg daily with food     What it does:   May decrease stone pain   May help stones pass faster   May make surgery more successful by improving access to stone   May decrease discomfort from ureteral stent, if used    Possible side effects:   Lightheadedness when standing too quickly (especially in older people)   Stuffy nose

## 2024-01-22 NOTE — TELEPHONE ENCOUNTER
Please clarify Why this phone call was forwarded to pcp and not to urology ?     I see no contraindication to have both meds together but I will let urology see this msg as well

## 2024-01-23 ENCOUNTER — HOSPITAL ENCOUNTER (OUTPATIENT)
Facility: CLINIC | Age: 54
End: 2024-01-23
Attending: UROLOGY | Admitting: UROLOGY
Payer: COMMERCIAL

## 2024-01-24 ENCOUNTER — TELEPHONE (OUTPATIENT)
Dept: UROLOGY | Facility: CLINIC | Age: 54
End: 2024-01-24

## 2024-01-24 ENCOUNTER — NURSE TRIAGE (OUTPATIENT)
Dept: NURSING | Facility: CLINIC | Age: 54
End: 2024-01-24

## 2024-01-24 DIAGNOSIS — N20.0 NEPHROLITHIASIS: Primary | ICD-10-CM

## 2024-01-24 DIAGNOSIS — N20.1 CALCULUS OF DISTAL LEFT URETER: Primary | ICD-10-CM

## 2024-01-24 RX ORDER — OXYCODONE HYDROCHLORIDE 5 MG/1
5 TABLET ORAL EVERY 6 HOURS PRN
Qty: 12 TABLET | Refills: 0 | Status: CANCELLED | OUTPATIENT
Start: 2024-01-24 | End: 2024-01-27

## 2024-01-24 RX ORDER — OXYCODONE HYDROCHLORIDE 5 MG/1
5 TABLET ORAL EVERY 6 HOURS PRN
Qty: 12 TABLET | Refills: 0 | Status: SHIPPED | OUTPATIENT
Start: 2024-01-24 | End: 2024-01-25

## 2024-01-24 NOTE — TELEPHONE ENCOUNTER
[facility] Call Center    Phone Message    May a detailed message be left on voicemail: no     Reason for Call: Other: Patient called to let us know that she is in a lot of pain still and would like a refill on her oxy if at all possible. Please call patient back to discuss.     Action Taken: Other: [facility]    Travel Screening: Not Applicable

## 2024-01-24 NOTE — PROVIDER NOTIFICATION
MD Notification    Notified Person: MD    Notified Person Name: Brianda    Notification Date/Time: 1/24 @ 1130    Notification Interaction: VM and 3 attempted calls to Brianda & Kimberly    Purpose of Notification: Patient taking oral Semaglutide (per anesthesia guidelines this must be held for 7 days.) Patient has not taken it the last two days only.        Waiting to hear back. Patient still on surgery schedule as of 1350.

## 2024-01-24 NOTE — TELEPHONE ENCOUNTER
Patient requesting a refill of pain medication for ureteral stone, her surgery was scheduled for tomorrow, but will need to be pushed back a week.  She states that she is taking protocol medications as suggested, but is not keeping the pain under control.  Mary Jones RN

## 2024-01-24 NOTE — TELEPHONE ENCOUNTER
Refill request sent to provider for pain medication refill.  Spoke  with patient to let her know.  Mary Jones RN

## 2024-01-25 ENCOUNTER — TELEPHONE (OUTPATIENT)
Dept: UROLOGY | Facility: CLINIC | Age: 54
End: 2024-01-25
Payer: COMMERCIAL

## 2024-01-25 DIAGNOSIS — N20.1 CALCULUS OF DISTAL LEFT URETER: ICD-10-CM

## 2024-01-25 RX ORDER — OXYCODONE HYDROCHLORIDE 5 MG/1
5 TABLET ORAL EVERY 6 HOURS PRN
Qty: 12 TABLET | Refills: 0 | Status: SHIPPED | OUTPATIENT
Start: 2024-01-25 | End: 2024-08-15

## 2024-01-25 NOTE — TELEPHONE ENCOUNTER
Patient requests to have medication sent to alternative pharmacy.  Previous refill was cancelled as is narcotic medication.  Please advise, alternative pharmacy is listed.  Mary Jones RN

## 2024-01-25 NOTE — TELEPHONE ENCOUNTER
Patient called and stated she was supposed to have surgery for a left kidney stone tomorrow, but is unable due to being on Rybelsus which was supposed to be stopped 7 days prior to procedure. This will have to be rescheduled, but in the meantime, pt is still having severe pain and would like pain medication. On call [facility] provider paged at [phone]. Repaged provider at [phone]. Provider to call back in 10 minutes. Spoke to provider, Dr. Herndon, and he has sent prescription for oxycodone, 12 tablets, to [facility] in [place], [place]. Pt was called back to let her know. Her sister will give her a ride to the pharmacy to .     Reason for Disposition   [1] Caller has URGENT medicine question about med that PCP or specialist prescribed AND [2] triager unable to answer question    Protocols used: Medication Question Call-A-

## 2024-01-25 NOTE — TELEPHONE ENCOUNTER
Spoke with patient to let her know that we are working on a new rx for oxycodone.  Called to [facility] and cancelled previous order.  Re-order sent to provider.  Mary Jones RN

## 2024-01-25 NOTE — TELEPHONE ENCOUNTER
Patient calls in and states a doctor just sent a pain medicine prescription to a [facility] in [place].  Patient states she has 2 pain pills left from a surgery and is ok until tomorrow.  Patient states she cannot get to the [facility] in [place] because her sister works until 7 p.m. tomorrow.    Patient is requesting the pain medicine prescription be chagned to the [facility] location.  Patient reiterated that she is not able to get to the [facility].    Patient states she lives with her sister in [place] right now awaiting a surgery and is not at her usual address..    At 2038, I paged Dr. MARTINEZ [facility] via [employer], awaiting call back.    I spoke with Dr. Martinez approximately 2105.  Discussed patients pharmacy request; MD states he will change the order overnight tonight as pharmacy is now closed and patient does not need medication now.    I called patient back at approximately 2111 and informed patient Dr. Martinez will send the prescription to the [facility] per patient request.    Informed patient to call [facility] pharmacy tomorrow and verify prescription was sent.      [place] RN on 1/24/2024 at 9:18 PM

## 2024-01-25 NOTE — TELEPHONE ENCOUNTER
[facility] Call Center    Phone Message    May a detailed message be left on voicemail: yes     Reason for Call: Medication Question or concern regarding medication   Prescription Clarification  Name of Medication: oxyCODONE (ROXICODONE) 5 MG tablet [99769]   Prescribing Provider: Sammy Herndon    Pharmacy: [facility] in [place]   What on the order needs clarification? Prescription was sent to [facility] in [place], but she needs it to go to [facility] in [place]. Pharmacist said they will not transfer a narcotics from location to location. Please resend orders to [facility] in [place] asap. Thank you!      Action Taken: Other: [facility]     Travel Screening: Not Applicable

## 2024-01-25 NOTE — TELEPHONE ENCOUNTER
Reason for Disposition    [1] Pharmacy calling with prescription question AND [2] triager unable to answer question    Caller requesting a CONTROLLED substance prescription refill (e.g., narcotics, ADHD medicines)    Additional Information    Negative: Drug overdose and triager unable to answer question    Negative: Caller requesting a renewal or refill of a medicine patient is currently taking    Negative: Caller requesting information unrelated to medicine    Negative: Caller requesting information about COVID-19 Vaccine    Negative: Caller requesting information about Emergency Contraception    Negative: Caller requesting information about Combined Birth Control Pills    Negative: Caller requesting information about Progestin Birth Control Pills    Negative: Caller requesting information about Post-Op pain or medicines    Negative: Caller requesting a prescription antibiotic (such as Penicillin) for Strep throat and has a positive culture result    Negative: Caller requesting a prescription anti-viral med (such as Tamiflu) and has influenza (flu) symptoms    Negative: Immunization reaction suspected    Negative: Rash while taking a medicine or within 3 days of stopping it    Negative: [1] Asthma and [2] having symptoms of asthma (cough, wheezing, etc.)    Negative: [1] Symptom of illness (e.g., headache, abdominal pain, earache, vomiting) AND [2] more than mild    Negative: Breastfeeding questions about mother's medicines and diet    Negative: MORE THAN A DOUBLE DOSE of a prescription or over-the-counter (OTC) drug    Negative: [1] DOUBLE DOSE (an extra dose or lesser amount) of prescription drug AND [2] any symptoms (e.g., dizziness, nausea, pain, sleepiness)    Negative: [1] DOUBLE DOSE (an extra dose or lesser amount) of over-the-counter (OTC) drug AND [2] any symptoms (e.g., dizziness, nausea, pain, sleepiness)    Negative: Took another person's prescription drug    Negative: [1] DOUBLE DOSE (an extra dose or  lesser amount) of prescription drug AND [2] NO symptoms  (Exception: A double dose of antibiotics.)    Negative: Diabetes drug error or overdose (e.g., took wrong type of insulin or took extra dose)    Negative: [1] Prescription not at pharmacy AND [2] was prescribed by PCP recently (Exception: Triager has access to EMR and prescription is recorded there. Go to Home Care and confirm for pharmacy.)    Negative: New-onset or worsening symptoms, see that guideline (e.g., diarrhea, runny nose, sore throat)    Negative: Medicine question not related to refill or renewal    Negative: Caller (e.g., patient or pharmacist) requesting information about a new medicine    Negative: Caller requesting information unrelated to medicine    Negative: [1] Prescription refill request for ESSENTIAL medicine (i.e., likelihood of harm to patient if not taken) AND [2] triager unable to refill per department policy    Negative: [1] Prescription not at pharmacy AND [2] was prescribed by PCP recently  (Exception: Triager has access to EMR and prescription is recorded there. Go to Home Care and confirm for pharmacy.)    Negative: [1] Pharmacy calling with prescription questions AND [2] triager unable to answer question    Negative: Prescription request for new medicine (not a refill)    Protocols used: Medication Question Call-A-, Medication Refill and Renewal Call-A-

## 2024-01-26 RX ORDER — OXYCODONE HYDROCHLORIDE 5 MG/1
5 TABLET ORAL EVERY 6 HOURS PRN
COMMUNITY
Start: 2023-12-30 | End: 2024-01-26

## 2024-01-27 DIAGNOSIS — J44.9 COPD, MODERATE (H): ICD-10-CM

## 2024-01-27 NOTE — PROGRESS NOTES
"Swetha Patterson is a 50 year old female who is being evaluated via a billable video visit.      The patient has been notified of followin    \"This video visit will be conducted via a call between you and your physician/provider. We have found that certain health care needs can be provided without the need for an in-person physical exam.  This service lets us provide the care you need with a video conversation.  If a prescription is necessary we can send it directly to your pharmacy.  If lab work is needed we can place an order for that and you can then stop by our lab to have the test done at a later time.    Video visits are billed at different rates depending on your insurance coverage.  Please reach out to your insurance provider with any questions.    If during the course of the call the physician/provider feels a video visit is not appropriate, you will not be charged for this service.\"    Patient has given verbal consent for Video visit? Yes  How would you like to obtain your AVS? MyChart  If you are dropped from the video visit, the video invite should be resent to: Text to cell phone: [phone]  Will anyone else be joining your video visit? No            This is a VIDEO ( using Doximity)  encounter with the patient.       Location of the provider : office   Location of the patient : home    07:42 -- 07:56         Dr Low's note      Patient's instructions / PLAN:                                                        Plan:  1. Lyrica 25 mg 3 times a day   2. Follow up video in 1-2 weeks         ASSESSMENT & PLAN:                                                      (M79.7) Fibromyalgia  (primary encounter diagnosis)  Comment: Amitriptyline hasn't helped. Gabapentin - side effects  We discussed about the new meds, advantages and potential side effects. The patient will read also the info from the pharmacy and call back if questions.   Plan: pregabalin (LYRICA) 25 MG capsule               Chief " complaint:                                                      Fibromyalgia     SUBJECTIVE:                                                    History of present illness:    Dx Fibromyalgia   -- she developed side effects from Gabapentin: she remembers nausea and mild rash  -- she has tried Amitriptyline in [id number] and it id didn't help   -- willing to try Lyrica  -- I discussed her case with dr Buck, neurology. He recommended pain clinic if Lyrica doesn't help       Hyperlipidemia Follow-Up      Are you regularly taking any medication or supplement to lower your cholesterol?   Yes- lipitor    Are you having muscle aches or other side effects that you think could be caused by your cholesterol lowering medication?  No    Hypertension Follow-up      Do you check your blood pressure regularly outside of the clinic? Yes     Are you following a low salt diet? Yes    Are your blood pressures ever more than 140 on the top number (systolic) OR more   than 90 on the bottom number (diastolic), for example 140/90? No      How many servings of fruits and vegetables do you eat daily?  2-3    On average, how many sweetened beverages do you drink each day (Examples: soda, juice, sweet tea, etc.  Do NOT count diet or artificially sweetened beverages)?   0    How many days per week do you exercise enough to make your heart beat faster? 4    How many minutes a day do you exercise enough to make your heart beat faster? 10 - 19    How many days per week do you miss taking your medication? 0         Review of Systems:                                                      ROS: negative for fever, chills, cough, wheezes, chest pain, shortness of breath, vomiting, abdominal pain, leg swelling       OBJECTIVE:           An actual physical exam can't be done during phone visit   A limited exam can sometimes be performed by video visit         PMHx: reviewed  Past Medical History:   Diagnosis Date     Anxiety state, unspecified      Asthma   "    Chronic pain     back pain from cyst     Contact dermatitis and other eczema, due to unspecified cause      COPD (chronic obstructive pulmonary disease) (H)      Depressive disorder, not elsewhere classified      Emphysema with chronic bronchitis (H)      Esophageal reflux      Family history of ischemic heart disease      Gastro-oesophageal reflux disease      History of emphysema      Hoarseness      HTN, goal below 140/90      Hyperlipidemia LDL goal <130      Liver disease     \"fatty liver\"     Other chronic pain     chest, from coughing     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)      PONV (postoperative nausea and vomiting)       PSHx: reviewed  Past Surgical History:   Procedure Laterality Date     ARTHROSCOPY SHOULDER DECOMPRESSION Left 10/21/2015    Procedure: ARTHROSCOPY SHOULDER DECOMPRESSION;  Surgeon: Julien Milian MD;  Location: [facility]     BIOPSY ARTERY TEMPORAL Left 3/11/2020    Procedure: LEFT TEMPORAL ARTERY BIOPSY;  Surgeon: Ibeth Conner MD;  Location: [facility]     BRONCHIAL THERMOPLASTY N/A 11/14/2014    Procedure: BRONCHIAL THERMOPLASTY;  Surgeon: Ward Whitaker MD;  Location: [facility]     BRONCHIAL THERMOPLASTY N/A 12/19/2014    Procedure: BRONCHIAL THERMOPLASTY;  Surgeon: Ward Whitaker MD;  [facility]: [facility]     BRONCHIAL THERMOPLASTY N/A 2/6/2015    Procedure: BRONCHIAL THERMOPLASTY;  Surgeon: Ward Whitaker MD;  Location: [facility]     C APPENDECTOMY  at age 17     COLONOSCOPY N/A 11/26/2018    Procedure: COLONOSCOPY (MN);  Surgeon: Marshall Oakes MD;  Location: [facility]     DISCECTOMY, FUSION CERVICAL ANTERIOR ONE LEVEL, COMBINED N/A 5/1/2018    Procedure: COMBINED DISCECTOMY, FUSION CERVICAL ANTERIOR ONE LEVEL;  1.  C5-C6 anterior cervical diskectomy and fusion.    2.  C5-C6 application of intervertebral biomechanical device for interbody fusion purposes.    3.  C5-C6 anterior instrumentation using the standard Kristin InViZia 24 mm plate with four " associated bone screws, 12 mm in length.  Inferior screws are fixed.  Superior screws are va     ENT SURGERY  2015    polyps removed from vocal cords      EXCISE NODE MEDIASTINAL  4/26/2013    Procedure: EXCISE NODE MEDIASTINAL;;  Surgeon: Av Peña MD;  Location: [facility]     LAPAROSCOPIC CHOLECYSTECTOMY N/A 10/19/2017    Procedure: LAPAROSCOPIC CHOLECYSTECTOMY;  LAPAROSCOPIC CHOLECYSTECTOMY;  Surgeon: Ney Jerry MD;  Location: [facility]     THORACOSCOPY  4/26/2013    Procedure: THORACOSCOPY;  LEFT VIDEO ASSISTED THORACOSCOPY, RESECTION OF POSTERIOR MEDIASTINAL MASS;  Surgeon: Av Peña MD;  Location:  OR     TONSILLECTOMY  as a kid     TONSILLECTOMY          Meds: reviewed  Current Outpatient Medications   Medication Sig Dispense Refill     acetaminophen (TYLENOL) 325 MG tablet Take 3 tablets (975 mg) by mouth every 6 hours as needed for pain 50 tablet 0     albuterol (PROAIR HFA/PROVENTIL HFA/VENTOLIN HFA) 108 (90 Base) MCG/ACT inhaler Inhale 2 puffs into the lungs every 6 hours as needed for shortness of breath / dyspnea 18 g 3     albuterol (PROVENTIL) (2.5 MG/3ML) 0.083% neb solution Take 1 vial (2.5 mg) by nebulization every 6 hours as needed for shortness of breath / dyspnea or wheezing 25 vial 3     amLODIPine (NORVASC) 5 MG tablet Take 1 tablet (5 mg) by mouth daily 90 tablet 0     atorvastatin (LIPITOR) 80 MG tablet Take 1 tablet (80 mg) by mouth daily 90 tablet 0     benzoyl peroxide 10 % EX external gel Apply topically 2 times daily 90 g 3     blood glucose (ACCU-CHEK ALLYSON PLUS) test strip USE TO TEST BLOOD SUGAR ONE TIME DAILY OR AS DIRECTED 100 each 0     blood glucose (NO BRAND SPECIFIED) lancets standard Use to test blood sugar 1 time daily 100 each 1     blood glucose monitoring (NO BRAND SPECIFIED) meter device kit Use to test blood sugar 1 times daily or as directed. 1 kit 0     budesonide-formoterol (SYMBICORT) 160-4.5 MCG/ACT IN Inhaler Inhale 2 puffs into the  lungs 2 times daily 3 Inhaler 1     buPROPion 150 MG PO 24 hr tablet Take 1 tablet (150 mg) by mouth every morning 90 tablet 1     cetirizine HCl 10 MG CAPS Take 1 capsule (10 mg) by mouth daily as needed Takes in the spring. 90 capsule 3     clindamycin 1 % EX external gel Apply topically 2 times daily 60 g 3     desonide (DESOWEN) 0.05 % external cream Apply topically as needed (rash) 60 g 0     doxycycline hyclate (VIBRA-TABS) 100 MG tablet Take 1 tablet (100 mg) by mouth 2 times daily 180 tablet 0     fluticasone 50 MCG/ACT NA nasal spray Spray 2 sprays in nostril daily 16 g 5     furosemide (LASIX) 20 MG tablet Take 1 tablet (20 mg) by mouth daily 90 tablet 0     glipiZIDE (GLUCOTROL XL) 2.5 MG 24 hr tablet Take 1 tablet (2.5 mg) by mouth daily 30 tablet 1     hydrOXYzine (ATARAX) 50 MG tablet TAKE TWO TABLETS BY MOUTH THREE TIMES A  tablet 0     ibuprofen (ADVIL/MOTRIN) 600 MG tablet Take 1 tablet (600 mg) by mouth every 6 hours as needed for pain 30 tablet 0     lamoTRIgine (LAMICTAL) 100 MG tablet        lisinopril (ZESTRIL) 20 MG tablet Take 1 tablet (20 mg) by mouth daily 90 tablet 0     metFORMIN (GLUCOPHAGE) 500 MG tablet Take 1 tablet (500 mg) by mouth daily (with breakfast) 90 tablet 0     metroNIDAZOLE (METROGEL) 0.75 % external gel Apply topically 2 times daily 45 g 1     montelukast (SINGULAIR) 10 MG tablet TAKE ONE TABLET BY MOUTH AT BEDTIME 90 tablet 0     nicotine (EQ NICOTINE POLACRILEX) 2 MG lozenge Place 1 lozenge (2 mg) inside cheek every hour as needed for smoking cessation 360 lozenge 1     nicotine (NICORETTE) 2 MG gum Place 1 each (2 mg) inside cheek as needed for smoking cessation 150 each 1     nicotine 14 MG/24HR TD 24 hr patch Place 1 patch onto the skin every 24 hours 30 patch 1     nitroGLYcerin (NITROSTAT) 0.4 MG sublingual tablet FOR CHEST PAIN PLACE ONE TABLET UNDER THE TONGUE EVERY 5 MINUTES FOR 3 DOSES.  IF SYPTOMS PERSIST 5 MINUTES AFTER 1ST DOSE CALL 911 25 tablet 0      nortriptyline (PAMELOR) 10 MG capsule Take 3 capsules (30 mg) by mouth At Bedtime 30 capsule 4     nystatin 252827 UNIT/GM EX external powder Apply topically 2 times daily as needed (skin rash) 60 g 11     omeprazole (PRILOSEC) 20 MG DR capsule Take 1 capsule (20 mg) by mouth 2 times daily 180 capsule 1     omeprazole 20 MG tablet Take 1 tablet (20 mg) by mouth 2 times daily 180 tablet 1     ondansetron (ZOFRAN) 8 MG tablet Take 0.5-1 tablets (4-8 mg) by mouth every 8 hours as needed for nausea 30 tablet 1     order for DME Please provide oximeter 1 Device 0     order for DME Equipment being ordered: Digital home blood pressure monitor kit 1 Device 0     order for DME Equipment being ordered: Pulse Oxymeter 1 Device 0     predniSONE (DELTASONE) 20 MG tablet Take 1 tablet (20 mg) by mouth daily 7 tablet 0     senna-docusate (SENOKOT-S/PERICOLACE) 8.6-50 MG tablet Take 1-2 tablets by mouth 2 times daily Use while taking narcotic pain medication to prevent constipation. 10 tablet 0     sulfamethoxazole-trimethoprim (BACTRIM DS) 800-160 MG tablet Take 1 tablet by mouth 2 times daily Take one tablet twice daily. 60 tablet 3     tiotropium (SPIRIVA HANDIHALER) 18 MCG IN inhaled capsule Inhale contents of one capsule daily. 90 capsule 3     varenicline (CHANTIX) 1 MG tablet Take 1 tablet (1 mg) by mouth 2 times daily 60 tablet 3       Soc Hx: reviewed  Fam Hx: reviewed          Roro Low MD  Internal Medicine      Alert and oriented to person, place and time

## 2024-01-29 ENCOUNTER — TELEPHONE (OUTPATIENT)
Dept: INTERNAL MEDICINE | Facility: CLINIC | Age: 54
End: 2024-01-29
Payer: COMMERCIAL

## 2024-01-29 RX ORDER — CETIRIZINE HYDROCHLORIDE 10 MG/1
10 TABLET ORAL DAILY PRN
Qty: 90 TABLET | Refills: 1 | Status: SHIPPED | OUTPATIENT
Start: 2024-01-29 | End: 2024-07-30

## 2024-01-29 NOTE — TELEPHONE ENCOUNTER
Fax received from [employer] - Revision to Plan of Care/Med Changes  for review and signature.  Put in Dr. Low's in basket.

## 2024-01-31 DIAGNOSIS — E11.9 TYPE 2 DIABETES MELLITUS WITHOUT COMPLICATION, WITHOUT LONG-TERM CURRENT USE OF INSULIN (H): ICD-10-CM

## 2024-01-31 DIAGNOSIS — J44.9 COPD, MODERATE (H): ICD-10-CM

## 2024-02-02 DIAGNOSIS — L73.2 HIDRADENITIS SUPPURATIVA: ICD-10-CM

## 2024-02-03 RX ORDER — ALBUTEROL SULFATE 90 UG/1
AEROSOL, METERED RESPIRATORY (INHALATION)
Qty: 6.7 G | Refills: 0 | Status: SHIPPED | OUTPATIENT
Start: 2024-02-03 | End: 2024-02-23

## 2024-02-03 RX ORDER — ORAL SEMAGLUTIDE 14 MG/1
TABLET ORAL DAILY
Qty: 30 TABLET | Refills: 2 | Status: SHIPPED | OUTPATIENT
Start: 2024-02-03 | End: 2024-05-02

## 2024-02-05 DIAGNOSIS — R60.0 BILATERAL LEG EDEMA: ICD-10-CM

## 2024-02-05 RX ORDER — FUROSEMIDE 20 MG
20 TABLET ORAL 2 TIMES DAILY
Qty: 180 TABLET | Refills: 0 | Status: SHIPPED | OUTPATIENT
Start: 2024-02-05 | End: 2024-05-02

## 2024-02-06 RX ORDER — SULFAMETHOXAZOLE/TRIMETHOPRIM 800-160 MG
TABLET ORAL
Qty: 180 TABLET | Refills: 0 | OUTPATIENT
Start: 2024-02-06

## 2024-02-06 NOTE — TELEPHONE ENCOUNTER
pt needs appt- pt Marisela  LCV:4-7-22  See 5-17-23 tele note  FYI to scheduling  Scheduling has been notified to contact the pt for appointment.

## 2024-02-07 ENCOUNTER — TRANSFERRED RECORDS (OUTPATIENT)
Dept: HEALTH INFORMATION MANAGEMENT | Facility: CLINIC | Age: 54
End: 2024-02-07
Payer: COMMERCIAL

## 2024-02-12 ENCOUNTER — TELEPHONE (OUTPATIENT)
Dept: DERMATOLOGY | Facility: CLINIC | Age: 54
End: 2024-02-12
Payer: COMMERCIAL

## 2024-02-12 NOTE — TELEPHONE ENCOUNTER
Patient Contacted and schedule the following:    Appointment type: Return  Provider: Ashley Antonio  Return date: 2/15/24  Specialty phone number: [phone]

## 2024-02-15 ENCOUNTER — TELEPHONE (OUTPATIENT)
Dept: INTERNAL MEDICINE | Facility: CLINIC | Age: 54
End: 2024-02-15

## 2024-02-22 ENCOUNTER — TRANSFERRED RECORDS (OUTPATIENT)
Dept: HEALTH INFORMATION MANAGEMENT | Facility: CLINIC | Age: 54
End: 2024-02-22

## 2024-02-22 DIAGNOSIS — K21.9 GASTROESOPHAGEAL REFLUX DISEASE WITHOUT ESOPHAGITIS: ICD-10-CM

## 2024-02-23 DIAGNOSIS — E03.9 HYPOTHYROIDISM, UNSPECIFIED TYPE: ICD-10-CM

## 2024-02-23 DIAGNOSIS — J44.9 COPD, MODERATE (H): ICD-10-CM

## 2024-02-23 RX ORDER — LEVOTHYROXINE SODIUM 50 UG/1
50 TABLET ORAL DAILY
Qty: 90 TABLET | Refills: 1 | Status: SHIPPED | OUTPATIENT
Start: 2024-02-23 | End: 2024-06-21

## 2024-02-23 RX ORDER — ALBUTEROL SULFATE 90 UG/1
AEROSOL, METERED RESPIRATORY (INHALATION)
Qty: 6.7 G | Refills: 0 | Status: SHIPPED | OUTPATIENT
Start: 2024-02-23

## 2024-02-23 RX ORDER — OMEPRAZOLE 40 MG/1
CAPSULE, DELAYED RELEASE ORAL
Qty: 60 CAPSULE | Refills: 2 | Status: SHIPPED | OUTPATIENT
Start: 2024-02-23 | End: 2024-07-30

## 2024-02-24 DIAGNOSIS — L30.9 ECZEMA, UNSPECIFIED TYPE: ICD-10-CM

## 2024-02-26 RX ORDER — HYDROCORTISONE 2.5 %
CREAM (GRAM) TOPICAL
Qty: 30 G | Refills: 1 | Status: SHIPPED | OUTPATIENT
Start: 2024-02-26

## 2024-03-01 ENCOUNTER — TELEPHONE (OUTPATIENT)
Dept: INTERNAL MEDICINE | Facility: CLINIC | Age: 54
End: 2024-03-01
Payer: COMMERCIAL

## 2024-03-04 ENCOUNTER — TELEPHONE (OUTPATIENT)
Dept: INTERNAL MEDICINE | Facility: CLINIC | Age: 54
End: 2024-03-04
Payer: COMMERCIAL

## 2024-03-05 DIAGNOSIS — Z53.9 DIAGNOSIS NOT YET DEFINED: Primary | ICD-10-CM

## 2024-03-05 PROCEDURE — G0179 MD RECERTIFICATION HHA PT: HCPCS | Performed by: INTERNAL MEDICINE

## 2024-03-06 DIAGNOSIS — J44.9 COPD, MODERATE (H): ICD-10-CM

## 2024-03-06 RX ORDER — BUDESONIDE AND FORMOTEROL FUMARATE DIHYDRATE 160; 4.5 UG/1; UG/1
2 AEROSOL RESPIRATORY (INHALATION) 2 TIMES DAILY
Qty: 30.6 G | Refills: 0 | Status: SHIPPED | OUTPATIENT
Start: 2024-03-06 | End: 2024-09-17

## 2024-03-11 ENCOUNTER — VIRTUAL VISIT (OUTPATIENT)
Dept: INTERNAL MEDICINE | Facility: CLINIC | Age: 54
End: 2024-03-11
Payer: COMMERCIAL

## 2024-03-11 DIAGNOSIS — J44.9 COPD, MODERATE (H): ICD-10-CM

## 2024-03-11 DIAGNOSIS — J44.1 COPD EXACERBATION (H): Primary | ICD-10-CM

## 2024-03-11 DIAGNOSIS — Z12.31 VISIT FOR SCREENING MAMMOGRAM: ICD-10-CM

## 2024-03-11 PROCEDURE — 99214 OFFICE O/P EST MOD 30 MIN: CPT | Mod: 95 | Performed by: INTERNAL MEDICINE

## 2024-03-11 RX ORDER — DOXYCYCLINE HYCLATE 100 MG
100 TABLET ORAL 2 TIMES DAILY
Qty: 20 TABLET | Refills: 0 | Status: SHIPPED | OUTPATIENT
Start: 2024-03-11 | End: 2024-07-10

## 2024-03-11 RX ORDER — PREDNISONE 20 MG/1
TABLET ORAL
Qty: 10 TABLET | Refills: 0 | Status: SHIPPED | OUTPATIENT
Start: 2024-03-11 | End: 2024-03-18

## 2024-03-11 RX ORDER — ALBUTEROL SULFATE 0.83 MG/ML
SOLUTION RESPIRATORY (INHALATION)
Qty: 75 ML | Refills: 1 | Status: SHIPPED | OUTPATIENT
Start: 2024-03-11

## 2024-03-11 ASSESSMENT — ANXIETY QUESTIONNAIRES
4. TROUBLE RELAXING: NEARLY EVERY DAY
GAD7 TOTAL SCORE: 16
5. BEING SO RESTLESS THAT IT IS HARD TO SIT STILL: MORE THAN HALF THE DAYS
GAD7 TOTAL SCORE: 16
6. BECOMING EASILY ANNOYED OR IRRITABLE: NEARLY EVERY DAY
7. FEELING AFRAID AS IF SOMETHING AWFUL MIGHT HAPPEN: SEVERAL DAYS
IF YOU CHECKED OFF ANY PROBLEMS ON THIS QUESTIONNAIRE, HOW DIFFICULT HAVE THESE PROBLEMS MADE IT FOR YOU TO DO YOUR WORK, TAKE CARE OF THINGS AT HOME, OR GET ALONG WITH OTHER PEOPLE: SOMEWHAT DIFFICULT
2. NOT BEING ABLE TO STOP OR CONTROL WORRYING: NEARLY EVERY DAY
3. WORRYING TOO MUCH ABOUT DIFFERENT THINGS: NEARLY EVERY DAY
1. FEELING NERVOUS, ANXIOUS, OR ON EDGE: SEVERAL DAYS

## 2024-03-11 NOTE — PROGRESS NOTES
Swetha is a 53 year old who is being evaluated via a billable video visit.      How would you like to obtain your AVS? MyChart  If the video visit is dropped, the invitation should be resent by: Text to cell phone: [phone]  Will anyone else be joining your video visit? No          Assessment & Plan       COPD exacerbation (H)  Continue inhaled steroid and bronchodilator,   Start antibiotic and oral steroid   If symptoms worsen , recommend clinic or UC/ ER visit   - doxycycline hyclate (VIBRA-TABS) 100 MG tablet; Take 1 tablet (100 mg) by mouth 2 times daily  - predniSONE (DELTASONE) 20 MG tablet; Take 2 tablets (40 mg) by mouth daily for 3 days, THEN 1 tablet (20 mg) daily for 4 days.              See Patient Instructions    Subjective   Swetha is a 53 year old, presenting for the following health issues:  URI        3/11/2024     4:38 PM   Additional Questions   Roomed by Milena CHAVEZ   Accompanied by n/a     HPI     Acute Illness  Acute illness concerns: URI  Onset/Duration: 3 days  Symptoms:  Fever: No  Chills/Sweats: YES  Headache (location?): YES  Sinus Pressure: YES  Conjunctivitis:  No  Ear Pain: no  Rhinorrhea: YES  Congestion: YES  Sore Throat: No  Cough: YES-productive of yellow sputum  Wheeze: YES  Decreased Appetite: YES  Nausea: No  Vomiting: No  Diarrhea: No  Dysuria/Freq.: No  Dysuria or Hematuria: No  Fatigue/Achiness: YES  Sick/Strep Exposure: No  Therapies tried and outcome: using inhaler and neb, OTC cold and flu, not working    Presents with cough, SOB, productive cough, chills for the past 3 days.   Has COPD. Has been using her Symbicort and Albuterol and takes Robitussin.   Tested negative for COVID.   Has COPD, still smoking, down to 3 cigarettes a day.   Has diabetes, reports blood sugars around 140.   No chest pain, dizziness, fainting.       Review of Systems  Constitutional, HEENT, cardiovascular, pulmonary, gi and gu systems are negative, except as otherwise noted.      Objective     Vitals - Patient Reported  Pain Score: No Pain (0)        Physical Exam   GENERAL: alert and no distress  EYES: Eyes grossly normal to inspection.  No discharge or erythema, or obvious scleral/conjunctival abnormalities.  RESP: patient has audible wheeze, coughs frequently, no visible cyanosis.    SKIN: Visible skin clear. No significant rash, abnormal pigmentation or lesions.  NEURO: Cranial nerves grossly intact.  Mentation and speech appropriate for age.  PSYCH: Appropriate affect, tone, and pace of words    Lab on 10/10/2023   Component Date Value Ref Range Status    Sodium 10/10/2023 140  135 - 145 mmol/L Final    Reference intervals for this test were updated on 09/26/2023 to more accurately reflect our healthy population. There may be differences in the flagging of prior results with similar values performed with this method. Interpretation of those prior results can be made in the context of the updated reference intervals.     Potassium 10/10/2023 4.5  3.4 - 5.3 mmol/L Final    Chloride 10/10/2023 104  98 - 107 mmol/L Final    Carbon Dioxide (CO2) 10/10/2023 23  22 - 29 mmol/L Final    Anion Gap 10/10/2023 13  7 - 15 mmol/L Final    Urea Nitrogen 10/10/2023 18.9  6.0 - 20.0 mg/dL Final    Creatinine 10/10/2023 1.33 (H)  0.51 - 0.95 mg/dL Final    GFR Estimate 10/10/2023 47 (L)  >60 mL/min/1.73m2 Final    Calcium 10/10/2023 9.1  8.6 - 10.0 mg/dL Final    Glucose 10/10/2023 128 (H)  70 - 99 mg/dL Final    Magnesium 10/10/2023 1.7  1.7 - 2.3 mg/dL Final    TSH 10/10/2023 1.02  0.30 - 4.20 uIU/mL Final         Video-Visit Details    Type of service:  Video Visit   Video Start Time: 5:18 PM  Video End Time:5:27 PM    Originating Location (pt. Location): Home    Distant Location (provider location):  On-site  Platform used for Video Visit: Michael  Signed Electronically by: Gabriel Knight MD

## 2024-03-12 DIAGNOSIS — J44.9 COPD, MODERATE (H): ICD-10-CM

## 2024-03-13 ENCOUNTER — TELEPHONE (OUTPATIENT)
Dept: INTERNAL MEDICINE | Facility: CLINIC | Age: 54
End: 2024-03-13
Payer: COMMERCIAL

## 2024-03-13 RX ORDER — ALBUTEROL SULFATE 90 UG/1
AEROSOL, METERED RESPIRATORY (INHALATION)
Qty: 6.7 G | Refills: 0 | OUTPATIENT
Start: 2024-03-13

## 2024-03-15 ENCOUNTER — TELEPHONE (OUTPATIENT)
Dept: INTERNAL MEDICINE | Facility: CLINIC | Age: 54
End: 2024-03-15

## 2024-03-15 NOTE — TELEPHONE ENCOUNTER
Patient is calling because the doxycycline is not helping her at all. She has used something else in the past that was very helpful but she could not remember the name of it. Could she get this switched?

## 2024-03-18 DIAGNOSIS — F41.9 ANXIETY: ICD-10-CM

## 2024-03-18 RX ORDER — HYDROXYZINE HYDROCHLORIDE 50 MG/1
TABLET, FILM COATED ORAL
Qty: 70 TABLET | Refills: 0 | Status: SHIPPED | OUTPATIENT
Start: 2024-03-18 | End: 2024-04-25

## 2024-03-19 ENCOUNTER — TELEPHONE (OUTPATIENT)
Dept: INTERNAL MEDICINE | Facility: CLINIC | Age: 54
End: 2024-03-19
Payer: COMMERCIAL

## 2024-04-01 ENCOUNTER — TELEPHONE (OUTPATIENT)
Dept: INTERNAL MEDICINE | Facility: CLINIC | Age: 54
End: 2024-04-01
Payer: COMMERCIAL

## 2024-04-01 DIAGNOSIS — M25.551 HIP PAIN, RIGHT: Primary | ICD-10-CM

## 2024-04-01 NOTE — TELEPHONE ENCOUNTER
Please see message below and advise.  Thanks!    Please see radiology from 3/29/24 on Care Everywhere for Right hip pain.      See specifically this information:  Imaging Results - XR HIP AP & LATERAL RT (03/29/2024 4:07 PM CDT)  Impressions   03/29/2024 3:57 PM CDT   IMPRESSION:  1.  Limited views demonstrate mild to moderate right hip osteoarthropathy.  2.  Ossific density proximal right femur diaphysis that appears to have been present on 12/30/2023  images. No discrete aggressive features. Consider right femur radiograph for further characterization.

## 2024-04-01 NOTE — TELEPHONE ENCOUNTER
Patient calls today. She stated that while she was in [facility] for [facility], she was in a lot of pain and could not walk up the stairs. She went to [facility] and had  xrays of her hip and leg. Patient states she was told that there could be cancer hiding behind one of her bones and she was advised to have an MRI or CT. Patient is asking for referral to  orthopedics in [place].      Patient will go to the  to get her after visit summary for Dr Low and  Orthopedics.     Patient call [phone]

## 2024-04-03 ENCOUNTER — TELEPHONE (OUTPATIENT)
Dept: INTERNAL MEDICINE | Facility: CLINIC | Age: 54
End: 2024-04-03
Payer: COMMERCIAL

## 2024-04-09 ENCOUNTER — OFFICE VISIT (OUTPATIENT)
Dept: INTERNAL MEDICINE | Facility: CLINIC | Age: 54
End: 2024-04-09
Payer: COMMERCIAL

## 2024-04-09 VITALS
HEART RATE: 80 BPM | DIASTOLIC BLOOD PRESSURE: 70 MMHG | RESPIRATION RATE: 18 BRPM | BODY MASS INDEX: 38.54 KG/M2 | WEIGHT: 217.5 LBS | HEIGHT: 63 IN | TEMPERATURE: 96.5 F | OXYGEN SATURATION: 99 % | SYSTOLIC BLOOD PRESSURE: 110 MMHG

## 2024-04-09 DIAGNOSIS — E11.9 TYPE 2 DIABETES MELLITUS WITHOUT COMPLICATION, WITHOUT LONG-TERM CURRENT USE OF INSULIN (H): ICD-10-CM

## 2024-04-09 DIAGNOSIS — Z79.899 MEDICATION MANAGEMENT: Primary | ICD-10-CM

## 2024-04-09 DIAGNOSIS — I10 BENIGN ESSENTIAL HYPERTENSION: ICD-10-CM

## 2024-04-09 DIAGNOSIS — J44.1 COPD EXACERBATION (H): ICD-10-CM

## 2024-04-09 DIAGNOSIS — Z12.31 VISIT FOR SCREENING MAMMOGRAM: ICD-10-CM

## 2024-04-09 DIAGNOSIS — R73.01 IMPAIRED FASTING GLUCOSE: ICD-10-CM

## 2024-04-09 DIAGNOSIS — M79.7 FIBROMYALGIA: ICD-10-CM

## 2024-04-09 PROBLEM — N13.2 URETERAL STONE WITH HYDRONEPHROSIS: Status: ACTIVE | Noted: 2024-01-27

## 2024-04-09 LAB
ERYTHROCYTE [DISTWIDTH] IN BLOOD BY AUTOMATED COUNT: 15 % (ref 10–15)
FLUAV RNA SPEC QL NAA+PROBE: NEGATIVE
FLUBV RNA RESP QL NAA+PROBE: NEGATIVE
HBA1C MFR BLD: 5.8 % (ref 0–5.6)
HCT VFR BLD AUTO: 45 % (ref 35–47)
HGB BLD-MCNC: 14.6 G/DL (ref 11.7–15.7)
MCH RBC QN AUTO: 29 PG (ref 26.5–33)
MCHC RBC AUTO-ENTMCNC: 32.4 G/DL (ref 31.5–36.5)
MCV RBC AUTO: 90 FL (ref 78–100)
PLATELET # BLD AUTO: 292 10E3/UL (ref 150–450)
RBC # BLD AUTO: 5.03 10E6/UL (ref 3.8–5.2)
RSV RNA SPEC NAA+PROBE: NEGATIVE
SARS-COV-2 RNA RESP QL NAA+PROBE: NEGATIVE
WBC # BLD AUTO: 8.1 10E3/UL (ref 4–11)

## 2024-04-09 PROCEDURE — 87637 SARSCOV2&INF A&B&RSV AMP PRB: CPT | Performed by: INTERNAL MEDICINE

## 2024-04-09 PROCEDURE — 84443 ASSAY THYROID STIM HORMONE: CPT | Performed by: INTERNAL MEDICINE

## 2024-04-09 PROCEDURE — 36415 COLL VENOUS BLD VENIPUNCTURE: CPT | Performed by: INTERNAL MEDICINE

## 2024-04-09 PROCEDURE — 85027 COMPLETE CBC AUTOMATED: CPT | Performed by: INTERNAL MEDICINE

## 2024-04-09 PROCEDURE — 82043 UR ALBUMIN QUANTITATIVE: CPT | Performed by: INTERNAL MEDICINE

## 2024-04-09 PROCEDURE — 82570 ASSAY OF URINE CREATININE: CPT | Performed by: INTERNAL MEDICINE

## 2024-04-09 PROCEDURE — 83036 HEMOGLOBIN GLYCOSYLATED A1C: CPT | Performed by: INTERNAL MEDICINE

## 2024-04-09 PROCEDURE — 99214 OFFICE O/P EST MOD 30 MIN: CPT | Performed by: INTERNAL MEDICINE

## 2024-04-09 PROCEDURE — 80053 COMPREHEN METABOLIC PANEL: CPT | Performed by: INTERNAL MEDICINE

## 2024-04-09 PROCEDURE — 80061 LIPID PANEL: CPT | Performed by: INTERNAL MEDICINE

## 2024-04-09 RX ORDER — GABAPENTIN 100 MG/1
100 CAPSULE ORAL 3 TIMES DAILY
Qty: 90 CAPSULE | Refills: 4 | Status: SHIPPED | OUTPATIENT
Start: 2024-04-09 | End: 2024-09-12

## 2024-04-09 RX ORDER — CEFDINIR 300 MG/1
300 CAPSULE ORAL 2 TIMES DAILY
Qty: 14 CAPSULE | Refills: 0 | Status: SHIPPED | OUTPATIENT
Start: 2024-04-09 | End: 2024-07-10

## 2024-04-09 ASSESSMENT — PATIENT HEALTH QUESTIONNAIRE - PHQ9: SUM OF ALL RESPONSES TO PHQ QUESTIONS 1-9: 0

## 2024-04-09 ASSESSMENT — PAIN SCALES - GENERAL: PAINLEVEL: EXTREME PAIN (8)

## 2024-04-09 NOTE — PATIENT INSTRUCTIONS
Plan:  Cefdinir 300 mg twice a day - antibiotic   2.  Labs today - [address]   3. Mammogram ( please call [phone] to schedule it)   4. You need to schedule pap   5. Gabapentin 100 mg   6. Schedule a follow up appointment in Aug      Gabapentin 100 mg am noon Bed time   3 days    1 caps   3 days  1 caps  1 caps   3 days  1 caps 1 caps 1 caps   3 days  1 caps 1 caps 2 caps   3 days  2 caps 1 caps 2 caps

## 2024-04-09 NOTE — PROGRESS NOTES
Dr Low's note      Patient's instructions / PLAN:                                                        Plan:  Cefdinir 300 mg twice a day - antibiotic   2.  Labs today - [address]   3. Mammogram ( please call [phone] to schedule it)   4. You need to schedule pap   5. Gabapentin 100 mg         ASSESSMENT & PLAN:                                                        (Z79.899) Medication management  Comment:   Plan:     (E11.9) Type 2 diabetes mellitus without complication, without long-term current use of insulin (H)  Comment: Controlled    Plan: CBC with platelets, Comprehensive metabolic         panel, TSH with free T4 reflex, CANCELED:         Hemoglobin A1c, CANCELED: Albumin Random Urine         Quantitative with Creat Ratio            (R05.9) Cough  Comment:   Plan: Symptomatic Influenza A/B, RSV, & SARS-CoV2 PCR        (COVID-19) Nasopharyngeal            (J44.1) COPD exacerbation (H)  Comment:   Plan: cefdinir (OMNICEF) 300 MG capsule            (I10) Benign essential hypertension  Comment: Controlled    Plan: CBC with platelets, Comprehensive metabolic         panel, TSH with free T4 reflex, CANCELED:         Hemoglobin A1c, CANCELED: Albumin Random Urine         Quantitative with Creat Ratio            (M79.7) Fibromyalgia  Comment:   Plan: gabapentin (NEURONTIN) 100 MG capsule                (Z12.31) Visit for screening mammogram  (primary encounter diagnosis)  Comment:   Plan: MA SCREENING DIGITAL BILAT - Future  (s+30)               Chief complaint:                                                      Follow up chronic medical problems      SUBJECTIVE:                                                    History of present illness:    COPD exacerbation  -- doxy hasn't helped  -- still coughing. Yellow phlegm     Fibromyalgia on Gabapentin 100 mg 3 times a day helped but she thinks she would benefit from higher dose.       Subjective   Swetha is a 53 year old, presenting for the following  health issues:  Follow Up (Patient is being seen for an 6 month follow up.)      4/9/2024     9:11 AM   Additional Questions   Roomed by Shauna Ferrera     HPI       Diabetes Follow-up    How often are you checking your blood sugar? Two times daily  Blood sugar testing frequency justification:  Uncontrolled diabetes  What time of day are you checking your blood sugars (select all that apply)?  Before and after meals  Have you had any blood sugars above 200?  Yes A FEW DAYS   Have you had any blood sugars below 70?  No  What symptoms do you notice when your blood sugar is low?  None  What concerns do you have today about your diabetes? None and Other:    Do you have any of these symptoms? (Select all that apply)  No numbness or tingling in feet.  No redness, sores or blisters on feet.  No complaints of excessive thirst.  No reports of blurry vision.  No significant changes to weight.  Have you had a diabetic eye exam in the last 12 months? No        BP Readings from Last 2 Encounters:   11/15/23 122/64   10/18/23 122/74     Hemoglobin A1C (%)   Date Value   08/17/2022 5.5   01/24/2022 5.8 (H)   10/16/2020 5.6   02/20/2020 6.0 (H)     LDL Cholesterol Calculated   Date Value   08/17/2022      Comment:     Cannot estimate LDL when triglyceride exceeds 400 mg/dL   01/24/2022 80 mg/dL   02/20/2020 96 mg/dL   05/10/2019 176 mg/dL (H)     LDL Cholesterol Direct (mg/dL)   Date Value   08/17/2022 72             Hypertension Follow-up    Do you check your blood pressure regularly outside of the clinic? No   Are you following a low salt diet? Yes  Are your blood pressures ever more than 140 on the top number (systolic) OR more   than 90 on the bottom number (diastolic), for example 140/90? No  How many servings of fruits and vegetables do you eat daily?  2-3 A WEEK  On average, how many sweetened beverages do you drink each day (Examples: soda, juice, sweet tea, etc.  Do NOT count diet or artificially sweetened beverages)?   " 1  How many days per week do you exercise enough to make your heart beat faster? 3 or less  How many minutes a day do you exercise enough to make your heart beat faster? 9 or less  How many days per week do you miss taking your medication? 0      Review of Systems:                                                      ROS: negative for fever, chills,  wheezes, chest pain, shortness of breath, vomiting, abdominal pain, leg swelling Pos for cough     A 10-point review of systems was obtained.  Those pertinent are above and in the in the Subjective section.  The rest of the systems are negative.           OBJECTIVE:             Physical exam:  Blood pressure 110/70, pulse 80, temperature (!) 96.5  F (35.8  C), temperature source Tympanic, resp. rate 18, height 1.6 m (5' 3\"), weight 98.7 kg (217 lb 8 oz), last menstrual period 01/01/2014, SpO2 99%, not currently breastfeeding.     NAD, appears comfortable  Skin: no rashes   Neck: supple, no JVD,  No thyroidmegaly. Lymph nodes nonpalpable cervical and supraclavicular.  Chest: clear to auscultation bilaterally, good respiratory effort  Heart: S1 S2, RRR, no mgr appreciated  Abdomen: soft, not tender, no hepatosplenomegaly or masses appreciated, no abdominal bruit, present bowel sounds  Extremities: no edema,  Neurologic: A, Ox3, no focal signs appreciated    PMHx: reviewed  Past Medical History:   Diagnosis Date    Benign essential hypertension     Bipolar disorder (H)     Chronic abdominal pain     Chronic constipation     COPD (chronic obstructive pulmonary disease) (H)     Hypothyroidism     Impaired fasting glucose     Kidney stone     Obesity (BMI 30-39.9)     PONV (postoperative nausea and vomiting)     Pre-diabetes     Pure hypercholesterolemia       PSHx: reviewed  Past Surgical History:   Procedure Laterality Date    ANESTHESIA OUT OF OR MRI N/A 10/07/2021    Procedure: ANESTHESIA OUT OF OR MRI;  Surgeon: GENERIC ANESTHESIA PROVIDER;  Location:  OR    " ANESTHESIA OUT OF OR MRI N/A 5/11/2023    Procedure: MRI OF NECK WITH AND WITHOUT CONTRAST;  Surgeon: GENERIC ANESTHESIA PROVIDER;  Location:  OR    ANESTHESIA OUT OF OR MRI Left 6/26/2023    Procedure: Anesthesia out of OR MRI;  Surgeon: GENERIC ANESTHESIA PROVIDER;  Location: [facility]    APPENDECTOMY      ARTHROSCOPY SHOULDER DECOMPRESSION Left 10/21/2015    Procedure: ARTHROSCOPY SHOULDER DECOMPRESSION;  Surgeon: Julien Milian MD;  Location: [facility] OR    BIOPSY ARTERY TEMPORAL Left 03/11/2020    Procedure: LEFT TEMPORAL ARTERY BIOPSY;  Surgeon: Ibeth Conner MD;  Location: [facility]    BRONCHIAL THERMOPLASTY N/A 11/14/2014    Procedure: BRONCHIAL THERMOPLASTY;  Surgeon: Ward Whitaker MD;  [facility]: [facility]    BRONCHIAL THERMOPLASTY N/A 12/19/2014    Procedure: BRONCHIAL THERMOPLASTY;  Surgeon: Ward Whitaker MD;  [facility]    BRONCHIAL THERMOPLASTY N/A 02/06/2015    Procedure: BRONCHIAL THERMOPLASTY;  Surgeon: Ward Whitaker MD;  [facility]    COLONOSCOPY N/A 11/26/2018    Procedure: COLONOSCOPY ([facility]);  Surgeon: Marshall Oakes MD;  Location:  OR    COLONOSCOPY N/A 10/22/2020    Procedure: Colonoscopy;  Surgeon: Marshall Mccormick MD;  Location:  OR    COLONOSCOPY N/A 01/20/2023    Procedure: COLONOSCOPY, FLEXIBLE, WITH LESION REMOVAL USING SNARE;  Surgeon: Carson Domínguez MD;  Location:  GI    COMBINED CYSTOSCOPY, RETROGRADES, URETEROSCOPY, LASER HOLMIUM LITHOTRIPSY URETER(S), INSERT STENT Right 7/19/2023    Procedure: Cystoscopy, Right Ureteroscopy, Right Retrograde Pyelogram;  Surgeon: Sammy Herndon MD;  Location: [facility] OR    DISCECTOMY, FUSION CERVICAL ANTERIOR ONE LEVEL, COMBINED N/A 05/01/2018    Procedure: COMBINED DISCECTOMY, FUSION CERVICAL ANTERIOR ONE LEVEL;  1.  C5-C6 anterior cervical diskectomy and fusion.    2.  C5-C6 application of intervertebral biomechanical device for interbody fusion purposes.    3.  C5-C6 anterior  instrumentation using the standard Kristin InViZia 24 mm plate with four associated bone screws, 12 mm in length.  Inferior screws are fixed.  Superior screws are va    ESOPHAGOSCOPY, GASTROSCOPY, DUODENOSCOPY (EGD), COMBINED N/A 10/22/2020    Procedure: Esophagoscopy, gastroscopy, duodenoscopy with biopsies;  Surgeon: Marshall Mccormick MD;  [facility]: [facility]    ESOPHAGOSCOPY, GASTROSCOPY, DUODENOSCOPY (EGD), COMBINED N/A 06/17/2021    Procedure: ESOPHAGOGASTRODUODENOSCOPY, WITH BIOPSY;  Surgeon: Darrel Damon MD;  Location: [facility]    EXCISE MASS NECK Left 02/01/2023    Procedure: exploration of  NECK left;  Surgeon: Ibeth Conner MD;  Location: [facility] OR    EXCISE MASS TRUNK Left 11/03/2021    Procedure: EXCISION LEFT SHOULDER LIPOMA;  Surgeon: Ibeth Conner MD;  Location: [facility] OR    EXCISE NODE MEDIASTINAL  04/26/2013    Procedure: EXCISE NODE MEDIASTINAL;;  Surgeon: Av Peña MD;  [facility]: [facility]    LAPAROSCOPIC CHOLECYSTECTOMY N/A 10/19/2017    Procedure: LAPAROSCOPIC CHOLECYSTECTOMY;  LAPAROSCOPIC CHOLECYSTECTOMY;  Surgeon: Ney Jerry MD;  Location: [facility] OR    LARYNGOSCOPY, EXCISE VOCAL CORD LESION, COMBINED      THORACOSCOPY  04/26/2013    Procedure: THORACOSCOPY;  LEFT VIDEO ASSISTED THORACOSCOPY, RESECTION OF POSTERIOR MEDIASTINAL MASS;  Surgeon: Av Peña MD;  Location: [facility] OR    TONSILLECTOMY & ADENOIDECTOMY          Meds: reviewed  Current Outpatient Medications   Medication Sig Dispense Refill    acetaminophen (TYLENOL) 500 MG tablet Take 2 tablets (1,000 mg) by mouth every 6 hours as needed for pain      albuterol (PROAIR HFA/PROVENTIL HFA/VENTOLIN HFA) 108 (90 Base) MCG/ACT inhaler INHALE TWO PUFFS BY MOUTH EVERY 6 HOURS 6.7 g 0    albuterol (PROVENTIL) (2.5 MG/3ML) 0.083% neb solution INHALE ONE VIAL BY NEBULIZER EVERY 6 HOURS AS NEEDED FOR SHORTNESS OF BREATH OR WHEEZING Strength: (2.5 MG/3ML) 0.083% 75 mL 1    atorvastatin (LIPITOR) 80 MG tablet Take 1  tablet (80 mg) by mouth daily 90 tablet 1    benzoyl peroxide (ACNE-CLEAR) 10 % external gel Apply topically 2 times daily as needed 90 g 1    blood glucose (ACCU-CHEK ALLYSON PLUS) test strip USE TO TEST BLOOD SUGAR ONCE DAILY OR AS DIRECTED 100 strip 3    blood glucose (ACCU-CHEK GUIDE) test strip USE TO TEST BLOOD SUGAR ONCE A DAY OR AS DIRECTED 100 strip 1    blood glucose (NO BRAND SPECIFIED) lancets standard Use to test blood sugar 1 times daily or as directed. 100 Lancet 0    blood glucose (NO BRAND SPECIFIED) lancets standard Use to test blood sugar 1 times daily or as directed. 100 each 3    blood glucose (NO BRAND SPECIFIED) test strip Use to test blood sugar 1 times daily or as directed.  Dispense Accu-chek Allyson Plus or per patient insurance 100 strip 3    blood glucose monitoring (NO BRAND SPECIFIED) meter device kit Use to test blood sugar 1 times daily or as directed.  Dispense Accu-chek Allyson Plus or per insurance preference 1 kit 0    blood glucose monitoring (SOFTCLIX) lancets USE TO TEST BLOOD SUGARS ONCE DAILY OR AS DIRECTED 100 each 1    budesonide-formoterol (SYMBICORT) 160-4.5 MCG/ACT Inhaler Inhale 2 puffs into the lungs 2 times daily 30.6 g 0    buPROPion (WELLBUTRIN XL) 150 MG 24 hr tablet TAKE ONE TABLET BY MOUTH EVERY MORNING 90 tablet 2    cetirizine (ZYRTEC) 10 MG tablet TAKE ONE TABLET BY MOUTH EVERY DAY AS NEEDED 90 tablet 1    clindamycin (CLINDAMAX) 1 % external gel Apply topically 2 times daily 60 g 3    diazepam (VALIUM) 10 MG tablet Take 1 tablet by mouth 2 times daily      dimenhyDRINATE (DRAMAMINE) 50 MG tablet Take 1 tablet (50 mg) by mouth every 6 hours as needed for other (nephrolithiasis) 30 tablet 0    dimenhyDRINATE (DRAMAMINE) 50 MG tablet Take 1 tablet (50 mg) by mouth nightly as needed for sleep 14 tablet 0    doxycycline hyclate (VIBRA-TABS) 100 MG tablet Take 1 tablet (100 mg) by mouth 2 times daily 20 tablet 0    Emollient (CERAVE MOISTURIZING) CREA Externally apply 1  Application topically 2 times daily 453 g 11    eszopiclone (LUNESTA) 2 MG tablet       furosemide (LASIX) 20 MG tablet TAKE 1 TABLET BY MOUTH TWICE DAILY 180 tablet 0    gabapentin (NEURONTIN) 100 MG capsule Take 1 capsule (100 mg) by mouth 3 times daily 90 capsule 4    hydrocortisone 2.5 % cream APPLY TO AFFECTED AREA(S) TWO TIMES A DAY 30 g 1    hydrOXYzine HCl (ATARAX) 50 MG tablet TAKE TWO TABLETS BY MOUTH THREE TIMES A DAY AS NEEDED FOR ANXIETY 70 tablet 0    ibuprofen (ADVIL/MOTRIN) 200 MG tablet Take 3 tablets (600 mg) by mouth every 6 hours as needed for moderate pain (4-6)      lamoTRIgine (LAMICTAL) 150 MG tablet Take 150 mg by mouth 2 times daily      lamoTRIgine (LAMICTAL) 200 MG tablet Take 1 tablet by mouth 2 times daily      levothyroxine (SYNTHROID/LEVOTHROID) 50 MCG tablet Take 1 tablet (50 mcg) by mouth daily 90 tablet 1    lisinopril (ZESTRIL) 10 MG tablet Take 1 tablet (10 mg) by mouth daily 90 tablet 3    metFORMIN (GLUCOPHAGE) 500 MG tablet TAKE ONE TABLET BY MOUTH EVERY DAY WITH BREAKFAST 90 tablet 0    montelukast (SINGULAIR) 10 MG tablet TAKE ONE TABLET BY MOUTH AT BEDTIME 90 tablet 1    multivitamin, therapeutic (THERA-VIT) TABS tablet Take 1 tablet by mouth daily      naloxone (NARCAN) 4 MG/0.1ML nasal spray Spray 1 spray (4 mg) into one nostril alternating nostrils once as needed for opioid reversal every 2-3 minutes until assistance arrives 0.2 mL 0    nebivolol (BYSTOLIC) 5 MG tablet Take 1 tablet (5 mg) by mouth daily 30 tablet 11    nitroGLYcerin (NITROSTAT) 0.4 MG sublingual tablet PLACE ONE TABLET UNDER THE TONGUE AT THE 1ST SIGN OF ATTACK. IF PAIN IS UNRELIEVED OR WORSENED 5 MINUTES AFTER 1ST DOSE, PROMPT MEDICAL ASSISTANCE IS NEEDED. MAY REPEAT EVERY 5 MINUTES UNTIL PAIN IS RELIEVED. MAX OF THREE DOSES 25 tablet 4    nystatin (MYCOSTATIN) 042595 UNIT/GM external powder APPLY TOPICALLY TWICE A DAY AS NEEDED SKIN RASH 45 g 9    omeprazole (PRILOSEC) 20 MG DR capsule TAKE ONE  CAPSULE BY MOUTH TWICE A  capsule 3    omeprazole (PRILOSEC) 40 MG DR capsule TAKE ONE CAPSULE BY MOUTH TWICE A DAY FOR 1 MONTH THEN TAKE TAKE ONE CAPSULE BY MOUTH EVERY DAY 60 capsule 2    ondansetron (ZOFRAN ODT) 4 MG ODT tab Take 1 tablet (4 mg) by mouth every 8 hours as needed for nausea 4 tablet 0    oxyCODONE (ROXICODONE) 5 MG tablet Take 1 tablet (5 mg) by mouth every 6 hours as needed for pain 12 tablet 0    polyethylene glycol (MIRALAX) 17 GM/Dose powder DISSOLVE 17 GRAMS (1 CAPFUL) AND DRINK BY MOUTH ONCE DAILY Strength: 17 GM/SCOOP 510 g 1    Semaglutide (RYBELSUS) 14 MG tablet TAKE ONE TABLET BY MOUTH EVERY DAY 30 tablet 2    senna-docusate (SENOKOT-S/PERICOLACE) 8.6-50 MG tablet Take 1-2 tablets by mouth 2 times daily 30 tablet 0    sucralfate (CARAFATE) 1 GM tablet TAKE ONE TABLET BY MOUTH FOUR TIMES A DAY AS NEEDED FOR NAUSEA 120 tablet 0    sulfamethoxazole-trimethoprim (BACTRIM DS) 800-160 MG tablet Take 1 tablet by mouth 2 times daily Take one tablet twice daily. For additional refills, please schedule a follow-up appointment. 180 tablet 1    tamsulosin (FLOMAX) 0.4 MG capsule Take 1 capsule (0.4 mg) by mouth daily 30 capsule 0    terbinafine (LAMISIL) 1 % external cream APPLY TO AFFECTED AREA(S) TWO TIMES A DAY 30 g 2    triamcinolone (KENALOG) 0.025 % external ointment Apply topically 2 times daily 80 g 1    Vitamin D3 (CHOLECALCIFEROL) 125 MCG (5000 UT) tablet Take 1 tablet by mouth three times a week      VRAYLAR 6 MG capsule Take 6 mg by mouth daily         Soc Hx: reviewed  Fam Hx: reviewed      Chart documentation was completed, in part, with [employer] voice-recognition software. Even though reviewed, some grammatical, spelling, and word errors may remain.      Roro Low MD  Internal Medicine       Signed Electronically by: Roro Chawla MD

## 2024-04-10 ENCOUNTER — TELEPHONE (OUTPATIENT)
Dept: INTERNAL MEDICINE | Facility: CLINIC | Age: 54
End: 2024-04-10
Payer: COMMERCIAL

## 2024-04-10 LAB
ALBUMIN SERPL BCG-MCNC: 4.2 G/DL (ref 3.5–5.2)
ALP SERPL-CCNC: 178 U/L (ref 40–150)
ALT SERPL W P-5'-P-CCNC: 36 U/L (ref 0–50)
ANION GAP SERPL CALCULATED.3IONS-SCNC: 12 MMOL/L (ref 7–15)
AST SERPL W P-5'-P-CCNC: 29 U/L (ref 0–45)
BILIRUB SERPL-MCNC: 0.2 MG/DL
BUN SERPL-MCNC: 10.5 MG/DL (ref 6–20)
CALCIUM SERPL-MCNC: 9.7 MG/DL (ref 8.6–10)
CHLORIDE SERPL-SCNC: 103 MMOL/L (ref 98–107)
CHOLEST SERPL-MCNC: 221 MG/DL
CREAT SERPL-MCNC: 0.8 MG/DL (ref 0.51–0.95)
CREAT UR-MCNC: 74.5 MG/DL
DEPRECATED HCO3 PLAS-SCNC: 26 MMOL/L (ref 22–29)
EGFRCR SERPLBLD CKD-EPI 2021: 87 ML/MIN/1.73M2
FASTING STATUS PATIENT QL REPORTED: NO
GLUCOSE SERPL-MCNC: 100 MG/DL (ref 70–99)
HDLC SERPL-MCNC: 47 MG/DL
LDLC SERPL CALC-MCNC: 128 MG/DL
MICROALBUMIN UR-MCNC: <12 MG/L
MICROALBUMIN/CREAT UR: NORMAL MG/G{CREAT}
NONHDLC SERPL-MCNC: 174 MG/DL
POTASSIUM SERPL-SCNC: 4.1 MMOL/L (ref 3.4–5.3)
PROT SERPL-MCNC: 7.3 G/DL (ref 6.4–8.3)
SODIUM SERPL-SCNC: 141 MMOL/L (ref 135–145)
TRIGL SERPL-MCNC: 228 MG/DL
TSH SERPL DL<=0.005 MIU/L-ACNC: 1.77 UIU/ML (ref 0.3–4.2)

## 2024-04-14 ENCOUNTER — MYC MEDICAL ADVICE (OUTPATIENT)
Dept: INTERNAL MEDICINE | Facility: CLINIC | Age: 54
End: 2024-04-14
Payer: COMMERCIAL

## 2024-04-14 DIAGNOSIS — E78.5 HYPERLIPIDEMIA LDL GOAL <130: Chronic | ICD-10-CM

## 2024-04-14 DIAGNOSIS — E11.9 TYPE 2 DIABETES MELLITUS WITHOUT COMPLICATION, WITHOUT LONG-TERM CURRENT USE OF INSULIN (H): Primary | ICD-10-CM

## 2024-04-14 RX ORDER — ROSUVASTATIN CALCIUM 40 MG/1
40 TABLET, COATED ORAL DAILY
Qty: 90 TABLET | Refills: 1 | Status: SHIPPED | OUTPATIENT
Start: 2024-04-14

## 2024-04-16 ENCOUNTER — TELEPHONE (OUTPATIENT)
Dept: INTERNAL MEDICINE | Facility: CLINIC | Age: 54
End: 2024-04-16
Payer: COMMERCIAL

## 2024-04-16 NOTE — TELEPHONE ENCOUNTER
[facility] for review and signature.  Put in Dr. Low's in basket.

## 2024-04-17 ENCOUNTER — TRANSFERRED RECORDS (OUTPATIENT)
Dept: MULTI SPECIALTY CLINIC | Facility: CLINIC | Age: 54
End: 2024-04-17

## 2024-04-17 LAB — RETINOPATHY: NORMAL

## 2024-04-23 DIAGNOSIS — F41.9 ANXIETY: ICD-10-CM

## 2024-04-25 RX ORDER — HYDROXYZINE HYDROCHLORIDE 50 MG/1
TABLET, FILM COATED ORAL
Qty: 70 TABLET | Refills: 0 | Status: SHIPPED | OUTPATIENT
Start: 2024-04-25 | End: 2024-06-07

## 2024-04-27 ENCOUNTER — NURSE TRIAGE (OUTPATIENT)
Dept: NURSING | Facility: CLINIC | Age: 54
End: 2024-04-27
Payer: COMMERCIAL

## 2024-04-27 NOTE — TELEPHONE ENCOUNTER
Nurse Triage SBAR    Is this a 2nd Level Triage? NO    Situation: urinary tract symptoms    Background: history of UTIs.     Assessment: Patient is calling stating that she started to have urinary symptoms yesterday.  Patient is having urgency, frequency, strong odor, and having a hard time emptying her bladder.  She states that she had one episode of incontinence today.  She states that she is not having pain with urination and she doesn't have a thermometer but she doesn't think she has a fever.  She states that she is only able to urinate a few drops for the last few hours.    Protocol Recommended Disposition:   Go to ED Now    Recommendation:    Patient was having a difficult time answering the questions with yes or no answers.  Patient stated that she did not want to be seen, she just wanted the provider to give her a prescription.  The patient then gave the phone to her sister stating that her sister is her care giver and she can answer the questions for her.  Consent to communicate is on file.  The patient's sister, Rukhsana, went through the questions for the patient and confirmed that the patient has only been able to urinate a few drops and that her bladder feels full.  Recommend that the patient be evaluated in the emergency room. Rukhsana verbalized understanding information and she had no further questions.       Does the patient meet one of the following criteria for ADS visit consideration? 16+ years old, with an [facility] PCP     TIP  Providers, please consider if this condition is appropriate for management at one of our Acute and Diagnostic Services sites.     If patient is a good candidate, please use dotphrase <dot>triageresponse and select Refer to ADS to document.        Reason for Disposition   [1] Unable to urinate (or only a few drops) > 4 hours AND [2] bladder feels very full (e.g., palpable bladder or strong urge to urinate)    Additional Information   Negative: Shock suspected (e.g.,  cold/pale/clammy skin, too weak to stand, low BP, rapid pulse)   Negative: Sounds like a life-threatening emergency to the triager    Protocols used: Urinary Symptoms-A-AH

## 2024-04-29 ENCOUNTER — MEDICAL CORRESPONDENCE (OUTPATIENT)
Dept: HEALTH INFORMATION MANAGEMENT | Facility: CLINIC | Age: 54
End: 2024-04-29

## 2024-05-01 ENCOUNTER — TELEPHONE (OUTPATIENT)
Dept: INTERNAL MEDICINE | Facility: CLINIC | Age: 54
End: 2024-05-01
Payer: COMMERCIAL

## 2024-05-01 NOTE — TELEPHONE ENCOUNTER
Skilled nursing orders arrived via fax from [facility] for certification period 4/23/2024 to 6/21/2024    Placed in provider mailbox for review and signature

## 2024-05-01 NOTE — TELEPHONE ENCOUNTER
Fax received from [facility] 04/23/24 for review and signature.  Put in Dr. Low's in basket.

## 2024-05-02 DIAGNOSIS — Z53.9 DIAGNOSIS NOT YET DEFINED: Primary | ICD-10-CM

## 2024-05-02 DIAGNOSIS — R60.0 BILATERAL LEG EDEMA: ICD-10-CM

## 2024-05-02 DIAGNOSIS — E78.5 HYPERLIPIDEMIA LDL GOAL <130: Chronic | ICD-10-CM

## 2024-05-02 DIAGNOSIS — E11.9 TYPE 2 DIABETES MELLITUS WITHOUT COMPLICATION, WITHOUT LONG-TERM CURRENT USE OF INSULIN (H): ICD-10-CM

## 2024-05-02 DIAGNOSIS — R73.03 PREDIABETES: ICD-10-CM

## 2024-05-02 PROCEDURE — G0179 MD RECERTIFICATION HHA PT: HCPCS | Performed by: INTERNAL MEDICINE

## 2024-05-02 RX ORDER — ATORVASTATIN CALCIUM 80 MG/1
80 TABLET, FILM COATED ORAL DAILY
Qty: 90 TABLET | Refills: 3 | Status: SHIPPED | OUTPATIENT
Start: 2024-05-02 | End: 2024-08-19

## 2024-05-02 RX ORDER — FUROSEMIDE 20 MG
20 TABLET ORAL 2 TIMES DAILY
Qty: 180 TABLET | Refills: 3 | Status: SHIPPED | OUTPATIENT
Start: 2024-05-02

## 2024-05-02 RX ORDER — ORAL SEMAGLUTIDE 14 MG/1
TABLET ORAL DAILY
Qty: 90 TABLET | Refills: 1 | Status: SHIPPED | OUTPATIENT
Start: 2024-05-02

## 2024-05-08 ENCOUNTER — TELEPHONE (OUTPATIENT)
Dept: INTERNAL MEDICINE | Facility: CLINIC | Age: 54
End: 2024-05-08
Payer: COMMERCIAL

## 2024-05-08 NOTE — TELEPHONE ENCOUNTER
Fax received from [facility] 04/23/24  for review and signature.  Put in Dr. Low's in basket.

## 2024-05-09 DIAGNOSIS — Z53.9 DIAGNOSIS NOT YET DEFINED: Primary | ICD-10-CM

## 2024-05-09 PROCEDURE — G0179 MD RECERTIFICATION HHA PT: HCPCS | Performed by: INTERNAL MEDICINE

## 2024-05-16 NOTE — TELEPHONE ENCOUNTER
Pt called back also requesting 14 mg nicotine patches and nicotine gum. She stated she is also experiencing pain in her legs and is wondering if imaging should be ordered to look into it further. Please advise. Thanks.  
Pt calling for attached request. She stated that her current meter is not working. Please advise. Thanks.  
To get better and follow your care plan as instructed.

## 2024-06-02 ENCOUNTER — HEALTH MAINTENANCE LETTER (OUTPATIENT)
Age: 54
End: 2024-06-02

## 2024-06-03 ENCOUNTER — TELEPHONE (OUTPATIENT)
Dept: INTERNAL MEDICINE | Facility: CLINIC | Age: 54
End: 2024-06-03
Payer: COMMERCIAL

## 2024-06-03 DIAGNOSIS — E11.9 TYPE 2 DIABETES MELLITUS WITHOUT COMPLICATION, WITHOUT LONG-TERM CURRENT USE OF INSULIN (H): ICD-10-CM

## 2024-06-03 NOTE — TELEPHONE ENCOUNTER
Revision to plan of care medication changes recieved via fax. Form in your mailbox for signature.

## 2024-06-04 RX ORDER — LANCETS
EACH MISCELLANEOUS
Qty: 100 EACH | Refills: 1 | Status: SHIPPED | OUTPATIENT
Start: 2024-06-04

## 2024-06-05 ENCOUNTER — MEDICAL CORRESPONDENCE (OUTPATIENT)
Dept: HEALTH INFORMATION MANAGEMENT | Facility: CLINIC | Age: 54
End: 2024-06-05

## 2024-06-07 DIAGNOSIS — F41.9 ANXIETY: ICD-10-CM

## 2024-06-07 RX ORDER — HYDROXYZINE HYDROCHLORIDE 50 MG/1
TABLET, FILM COATED ORAL
Qty: 70 TABLET | Refills: 0 | Status: SHIPPED | OUTPATIENT
Start: 2024-06-07 | End: 2024-07-29

## 2024-06-13 ENCOUNTER — TELEPHONE (OUTPATIENT)
Dept: INTERNAL MEDICINE | Facility: CLINIC | Age: 54
End: 2024-06-13
Payer: COMMERCIAL

## 2024-06-18 ENCOUNTER — TELEPHONE (OUTPATIENT)
Dept: INTERNAL MEDICINE | Facility: CLINIC | Age: 54
End: 2024-06-18
Payer: COMMERCIAL

## 2024-06-20 ENCOUNTER — MEDICAL CORRESPONDENCE (OUTPATIENT)
Dept: HEALTH INFORMATION MANAGEMENT | Facility: CLINIC | Age: 54
End: 2024-06-20

## 2024-06-21 DIAGNOSIS — E03.9 HYPOTHYROIDISM, UNSPECIFIED TYPE: ICD-10-CM

## 2024-06-21 RX ORDER — LEVOTHYROXINE SODIUM 50 UG/1
50 TABLET ORAL DAILY
Qty: 90 TABLET | Refills: 2 | Status: SHIPPED | OUTPATIENT
Start: 2024-06-21

## 2024-06-24 DIAGNOSIS — N20.0 KIDNEY STONE: ICD-10-CM

## 2024-06-25 ENCOUNTER — TELEPHONE (OUTPATIENT)
Dept: INTERNAL MEDICINE | Facility: CLINIC | Age: 54
End: 2024-06-25
Payer: COMMERCIAL

## 2024-06-25 ENCOUNTER — TRANSFERRED RECORDS (OUTPATIENT)
Dept: HEALTH INFORMATION MANAGEMENT | Facility: CLINIC | Age: 54
End: 2024-06-25
Payer: COMMERCIAL

## 2024-06-25 NOTE — TELEPHONE ENCOUNTER
Home Health Certification arrived via fax from [facility] # [id number] for certification period 6/22/2024 to 8/20/2024    Placed in provider mailbox for signature

## 2024-06-25 NOTE — TELEPHONE ENCOUNTER
OXYCODONE HCL 10MG TABS    Last Written Prescription Date:  1/25/24  Last Fill Quantity: 12,   # refills: 0  Last Office Visit : 1/19/24 [facility] [place]  Future Office visit:  none    Routing refill request to provider for review/approval because:   Controlled substance/[facility] [place]

## 2024-06-25 NOTE — TELEPHONE ENCOUNTER
Patient calls today. She was in  in [facility] this weekend. She can not recall the name of the  clinic but will call back with that information. Md may want to see the chart notes from that visit.     Patient calls in to ask for referral to Neuro for alzheimer testing.  Patient stated that the UC Md recommended it based on the patient shakey hand, memory issues and frequent jaw movement. Patient did not stated why she was in the UC.       ** When pt calls back, ask her why she went to  and where she was seen      A vv appointment was made for patient 7-9-2024

## 2024-06-26 ENCOUNTER — TELEPHONE (OUTPATIENT)
Dept: INTERNAL MEDICINE | Facility: CLINIC | Age: 54
End: 2024-06-26
Payer: COMMERCIAL

## 2024-06-26 RX ORDER — OXYCODONE HYDROCHLORIDE 10 MG/1
TABLET ORAL
Qty: 6 TABLET | Refills: 0 | OUTPATIENT
Start: 2024-06-26

## 2024-06-26 NOTE — TELEPHONE ENCOUNTER
Walker seat order requested by a , or a PHYSICAL THERAPY/OCCUPATIONAL THERAPY. Do you want to sign off r this and if so should we schedule an appointment with you?  Form in your mailbox to be signed.

## 2024-06-27 ENCOUNTER — TELEPHONE (OUTPATIENT)
Dept: INTERNAL MEDICINE | Facility: CLINIC | Age: 54
End: 2024-06-27
Payer: COMMERCIAL

## 2024-06-27 NOTE — TELEPHONE ENCOUNTER
It looks like patient was seen at [facility] 6/26/24 and had an MRI LS ordered for right sided radiculopathy and weakness. Call placed to [facility], left message for a care coordinator Janette to fax records to [facility] or to call back. SERJIO Chandra R.N.

## 2024-06-27 NOTE — TELEPHONE ENCOUNTER
Patient called to request a pre-op appointment.   Procedure: MRI of lumbar spine with anesthesia  Date of procedure: 07/11/24    Can PCP work patient in or okay to schedule with Same day provider?

## 2024-06-27 NOTE — TELEPHONE ENCOUNTER
Janette, care coordinator with [facility] called. States that patient was only seen by them on 06/25/24 by Dr. Valdez Rosen at a regular clinic in [facility] (not Urgent Care) for a hip issue.     Janette direct line: [phone]    Janette will send the records.

## 2024-06-28 NOTE — TELEPHONE ENCOUNTER
Patient has a video appointment on July 9 to discuss memory.    We can change the appointment same day but then earlier time, in the office to do preop.  We will not discuss memory at this appointment

## 2024-06-28 NOTE — TELEPHONE ENCOUNTER
Writer had verbal with Dr Low to advise patient has her preop scheduled with Laney Patton. Dr Low agreed to that plan.

## 2024-06-30 DIAGNOSIS — E11.9 TYPE 2 DIABETES MELLITUS WITHOUT COMPLICATION, WITHOUT LONG-TERM CURRENT USE OF INSULIN (H): ICD-10-CM

## 2024-07-01 ENCOUNTER — TELEPHONE (OUTPATIENT)
Dept: MEDSURG UNIT | Facility: CLINIC | Age: 54
End: 2024-07-01
Payer: COMMERCIAL

## 2024-07-01 DIAGNOSIS — Z53.9 DIAGNOSIS NOT YET DEFINED: Primary | ICD-10-CM

## 2024-07-01 PROCEDURE — G0179 MD RECERTIFICATION HHA PT: HCPCS | Performed by: INTERNAL MEDICINE

## 2024-07-01 RX ORDER — BLOOD SUGAR DIAGNOSTIC
STRIP MISCELLANEOUS
Qty: 100 STRIP | Refills: 1 | Status: SHIPPED | OUTPATIENT
Start: 2024-07-01

## 2024-07-01 NOTE — TELEPHONE ENCOUNTER
Form not signed  I have not discussed with the patient, nor documented the need for a walker with seat  Patient has appointment for preop on July 3 with Laney.  I am not sure if she can look at the papers.  Patient has appointment with Maranda on July 9.  If times allowed we can discuss papers on the time.

## 2024-07-03 ENCOUNTER — TELEPHONE (OUTPATIENT)
Dept: MEDSURG UNIT | Facility: CLINIC | Age: 54
End: 2024-07-03

## 2024-07-05 ENCOUNTER — TELEPHONE (OUTPATIENT)
Dept: MEDSURG UNIT | Facility: CLINIC | Age: 54
End: 2024-07-05
Payer: COMMERCIAL

## 2024-07-08 ENCOUNTER — TELEPHONE (OUTPATIENT)
Dept: INTERNAL MEDICINE | Facility: CLINIC | Age: 54
End: 2024-07-08
Payer: COMMERCIAL

## 2024-07-09 ENCOUNTER — VIRTUAL VISIT (OUTPATIENT)
Dept: INTERNAL MEDICINE | Facility: CLINIC | Age: 54
End: 2024-07-09
Payer: COMMERCIAL

## 2024-07-09 ENCOUNTER — TELEPHONE (OUTPATIENT)
Dept: MEDSURG UNIT | Facility: CLINIC | Age: 54
End: 2024-07-09

## 2024-07-09 ENCOUNTER — TELEPHONE (OUTPATIENT)
Dept: PHARMACY | Facility: OTHER | Age: 54
End: 2024-07-09

## 2024-07-09 ENCOUNTER — MEDICAL CORRESPONDENCE (OUTPATIENT)
Dept: HEALTH INFORMATION MANAGEMENT | Facility: CLINIC | Age: 54
End: 2024-07-09

## 2024-07-09 DIAGNOSIS — M54.41 CHRONIC BILATERAL LOW BACK PAIN WITH RIGHT-SIDED SCIATICA: Primary | ICD-10-CM

## 2024-07-09 DIAGNOSIS — R29.898 RIGHT LEG WEAKNESS: ICD-10-CM

## 2024-07-09 DIAGNOSIS — G89.29 CHRONIC BILATERAL LOW BACK PAIN WITH RIGHT-SIDED SCIATICA: Primary | ICD-10-CM

## 2024-07-09 DIAGNOSIS — Z02.9 ADMINISTRATIVE ENCOUNTER: ICD-10-CM

## 2024-07-09 DIAGNOSIS — L71.9 ROSACEA: ICD-10-CM

## 2024-07-09 PROCEDURE — 99213 OFFICE O/P EST LOW 20 MIN: CPT | Mod: 95 | Performed by: INTERNAL MEDICINE

## 2024-07-09 RX ORDER — METRONIDAZOLE 7.5 MG/G
GEL TOPICAL 2 TIMES DAILY
Qty: 45 G | Refills: 2 | Status: SHIPPED | OUTPATIENT
Start: 2024-07-09

## 2024-07-09 NOTE — CONFIDENTIAL NOTE
MTM Recruitment: [employer]     Referral outreach attempt #1 on July 9, 2024      Outcome: call attempted, could not leave voicemail  and MyChart message sent    Rey Tay, Pharm. D., BCACP  Medication Therapy Management Pharmacist

## 2024-07-09 NOTE — PROGRESS NOTES
Swetha is a 54 year old who is being evaluated via a billable video visit.    How would you like to obtain your AVS? Mail a copy  If the video visit is dropped, the invitation should be resent by: Text to cell phone: [phone]  Will anyone else be joining your video visit? No          This is a VIDEO ( using Doxim[employer])  encounter with the patient.       Location of the provider : office   Location of the patient : home      11:41 --- 12:08               Dr Low's note        ASSESSMENT & PLAN:                                                      (M54.41,  G89.29) Chronic bilateral low back pain with right-sided sciatica  (primary encounter diagnosis)  (R29.898) Right leg weakness  (Z02.9) Administrative encounter  Comment: she definitely needs a walker with seat  Plan: Walker Order for DME - ONLY FOR DME        (L71.9) Rosacea  Comment: chin  Plan: metroNIDAZOLE (METROGEL) 0.75 % external gel                  Chief complaint:                                                      LBP  Walker    SUBJECTIVE:                                                    History of present illness:    LBP  -- with R siatica  -- f/unit(s) w [facility]  -- R leg gives up  -- problems standing up and moving      She can't stand to cook or take a shower   She can't walk without help. Somebody landed her a standard walker ( no wheels). She hopes with the walker but it causes increasing LBP.     Skin rash  -- she has used cortisone cream w no help  It looks like rosacea     Subjective   Swetha is a 54 year old, presenting for the following health issues:  Forms and Memory Loss      7/9/2024     9:45 AM   Additional Questions   Roomed by Shauna Ferrera       Objective    Vitals - Patient Reported  Pain Score: No Pain (0)      Review of Systems:                                                      ROS: negative for fever, chills, cough, wheezes, chest pain, vomiting, abdominal pain, leg swelling Pos for chr SOB and cough      OBJECTIVE:            An actual physical exam can't be done during phone visit   A limited exam can sometimes be performed by video visit   NAD      PMHx: reviewed  Past Medical History:   Diagnosis Date    Benign essential hypertension     Bipolar disorder (H)     Chronic abdominal pain     Chronic constipation     COPD (chronic obstructive pulmonary disease) (H)     Hypothyroidism     Impaired fasting glucose     Kidney stone     Obesity (BMI 30-39.9)     PONV (postoperative nausea and vomiting)     Pre-diabetes     Pure hypercholesterolemia       PSHx: reviewed  Past Surgical History:   Procedure Laterality Date    ANESTHESIA OUT OF OR MRI N/A 10/07/2021    Procedure: ANESTHESIA OUT OF OR MRI;  Surgeon: GENERIC ANESTHESIA PROVIDER;  Location: [facility] OR    ANESTHESIA OUT OF OR MRI N/A 5/11/2023    Procedure: MRI OF NECK WITH AND WITHOUT CONTRAST;  Surgeon: GENERIC ANESTHESIA PROVIDER;  Location: [facility]    ANESTHESIA OUT OF OR MRI Left 6/26/2023    Procedure: Anesthesia out of OR MRI;  Surgeon: GENERIC ANESTHESIA PROVIDER;  Location: [facility]    APPENDECTOMY      ARTHROSCOPY SHOULDER DECOMPRESSION Left 10/21/2015    Procedure: ARTHROSCOPY SHOULDER DECOMPRESSION;  Surgeon: Julien Milian MD;  Location: [facility] OR    BIOPSY ARTERY TEMPORAL Left 03/11/2020    Procedure: LEFT TEMPORAL ARTERY BIOPSY;  Surgeon: Ibeth Conner MD;  Location: [facility]    BRONCHIAL THERMOPLASTY N/A 11/14/2014    Procedure: BRONCHIAL THERMOPLASTY;  Surgeon: Ward Whitaker MD;  Location: [facility]    BRONCHIAL THERMOPLASTY N/A 12/19/2014    Procedure: BRONCHIAL THERMOPLASTY;  Surgeon: Ward Whitaker MD;  [facility]: [facility]    BRONCHIAL THERMOPLASTY N/A 02/06/2015    Procedure: BRONCHIAL THERMOPLASTY;  Surgeon: Ward Whitaker MD;  [facility]: [facility]    COLONOSCOPY N/A 11/26/2018    Procedure: COLONOSCOPY ([facility]);  Surgeon: Marshall Oakes MD;  Location: [facility]    COLONOSCOPY N/A 10/22/2020    Procedure: Colonoscopy;  Surgeon: Marshall Mccormick  MD;  Location:  OR    COLONOSCOPY N/A 01/20/2023    Procedure: COLONOSCOPY, FLEXIBLE, WITH LESION REMOVAL USING SNARE;  Surgeon: Carson Domínguez MD;  Location:  GI    COMBINED CYSTOSCOPY, RETROGRADES, URETEROSCOPY, LASER HOLMIUM LITHOTRIPSY URETER(S), INSERT STENT Right 7/19/2023    Procedure: Cystoscopy, Right Ureteroscopy, Right Retrograde Pyelogram;  Surgeon: Sammy Herndon MD;  Location: [facility] OR    DISCECTOMY, FUSION CERVICAL ANTERIOR ONE LEVEL, COMBINED N/A 05/01/2018    Procedure: COMBINED DISCECTOMY, FUSION CERVICAL ANTERIOR ONE LEVEL;  1.  C5-C6 anterior cervical diskectomy and fusion.    2.  C5-C6 application of intervertebral biomechanical device for interbody fusion purposes.    3.  C5-C6 anterior instrumentation using the standard Kristin InViZia 24 mm plate with four associated bone screws, 12 mm in length.  Inferior screws are fixed.  Superior screws are va    ESOPHAGOSCOPY, GASTROSCOPY, DUODENOSCOPY (EGD), COMBINED N/A 10/22/2020    Procedure: Esophagoscopy, gastroscopy, duodenoscopy with biopsies;  Surgeon: Marshall Mccormick MD;  Location:  OR    ESOPHAGOSCOPY, GASTROSCOPY, DUODENOSCOPY (EGD), COMBINED N/A 06/17/2021    Procedure: ESOPHAGOGASTRODUODENOSCOPY, WITH BIOPSY;  Surgeon: Darrel Damon MD;  Location:  GI    EXCISE MASS NECK Left 02/01/2023    Procedure: exploration of  NECK left;  Surgeon: Ibeth Conner MD;  Location:  OR    EXCISE MASS TRUNK Left 11/03/2021    Procedure: EXCISION LEFT SHOULDER LIPOMA;  Surgeon: Ibeth Conner MD;  Location:  OR    EXCISE NODE MEDIASTINAL  04/26/2013    Procedure: EXCISE NODE MEDIASTINAL;;  Surgeon: Av Peña MD;  Location:  OR    LAPAROSCOPIC CHOLECYSTECTOMY N/A 10/19/2017    Procedure: LAPAROSCOPIC CHOLECYSTECTOMY;  LAPAROSCOPIC CHOLECYSTECTOMY;  Surgeon: Ney Jerry MD;  Location:  OR    LARYNGOSCOPY, EXCISE VOCAL CORD LESION, COMBINED      THORACOSCOPY  04/26/2013    Procedure:  THORACOSCOPY;  LEFT VIDEO ASSISTED THORACOSCOPY, RESECTION OF POSTERIOR MEDIASTINAL MASS;  Surgeon: Av Peña MD;  Location: [facility] OR    TONSILLECTOMY & ADENOIDECTOMY          Meds: reviewed  Current Outpatient Medications   Medication Sig Dispense Refill    ACCU-CHEK GUIDE test strip USE TO TEST BLOOD SUGAR ONCE A DAY OR AS DIRECTED 100 strip 1    acetaminophen (TYLENOL) 500 MG tablet Take 2 tablets (1,000 mg) by mouth every 6 hours as needed for pain      albuterol (PROAIR HFA/PROVENTIL HFA/VENTOLIN HFA) 108 (90 Base) MCG/ACT inhaler INHALE TWO PUFFS BY MOUTH EVERY 6 HOURS 6.7 g 0    albuterol (PROVENTIL) (2.5 MG/3ML) 0.083% neb solution INHALE ONE VIAL BY NEBULIZER EVERY 6 HOURS AS NEEDED FOR SHORTNESS OF BREATH OR WHEEZING Strength: (2.5 MG/3ML) 0.083% 75 mL 1    atorvastatin (LIPITOR) 80 MG tablet TAKE ONE TABLET BY MOUTH EVERY DAY 90 tablet 3    benzoyl peroxide (ACNE-CLEAR) 10 % external gel Apply topically 2 times daily as needed 90 g 1    blood glucose (ACCU-CHEK ALLYSON PLUS) test strip USE TO TEST BLOOD SUGAR ONCE DAILY OR AS DIRECTED 100 strip 3    blood glucose (NO BRAND SPECIFIED) lancets standard Use to test blood sugar 1 times daily or as directed. 100 Lancet 0    blood glucose (NO BRAND SPECIFIED) lancets standard Use to test blood sugar 1 times daily or as directed. 100 each 3    blood glucose (NO BRAND SPECIFIED) test strip Use to test blood sugar 1 times daily or as directed.  Dispense Accu-chek Allyson Plus or per patient insurance 100 strip 3    blood glucose monitoring (NO BRAND SPECIFIED) meter device kit Use to test blood sugar 1 times daily or as directed.  Dispense Accu-chek Allsyon Plus or per insurance preference 1 kit 0    blood glucose monitoring (SOFTCLIX) lancets USE TO TEST BLOOD SUGARS ONCE DAILY OR AS DIRECTED 100 each 1    budesonide-formoterol (SYMBICORT) 160-4.5 MCG/ACT Inhaler Inhale 2 puffs into the lungs 2 times daily 30.6 g 0    buPROPion (WELLBUTRIN XL) 150 MG 24 hr  tablet TAKE ONE TABLET BY MOUTH EVERY MORNING 90 tablet 2    cefdinir (OMNICEF) 300 MG capsule Take 1 capsule (300 mg) by mouth 2 times daily 14 capsule 0    cetirizine (ZYRTEC) 10 MG tablet TAKE ONE TABLET BY MOUTH EVERY DAY AS NEEDED 90 tablet 1    clindamycin (CLINDAMAX) 1 % external gel Apply topically 2 times daily 60 g 3    diazepam (VALIUM) 10 MG tablet Take 1 tablet by mouth 2 times daily      dimenhyDRINATE (DRAMAMINE) 50 MG tablet Take 1 tablet (50 mg) by mouth every 6 hours as needed for other (nephrolithiasis) 30 tablet 0    dimenhyDRINATE (DRAMAMINE) 50 MG tablet Take 1 tablet (50 mg) by mouth nightly as needed for sleep 14 tablet 0    doxycycline hyclate (VIBRA-TABS) 100 MG tablet Take 1 tablet (100 mg) by mouth 2 times daily 20 tablet 0    Emollient (CERAVE MOISTURIZING) CREA Externally apply 1 Application topically 2 times daily 453 g 11    eszopiclone (LUNESTA) 2 MG tablet       furosemide (LASIX) 20 MG tablet TAKE 1 TABLET BY MOUTH TWICE DAILY 180 tablet 3    gabapentin (NEURONTIN) 100 MG capsule Take 1 capsule (100 mg) by mouth 3 times daily 90 capsule 4    hydrocortisone 2.5 % cream APPLY TO AFFECTED AREA(S) TWO TIMES A DAY 30 g 1    hydrOXYzine HCl (ATARAX) 50 MG tablet TAKE TWO TABLETS BY MOUTH THREE TIMES A DAY AS NEEDED FOR ANXIETY 70 tablet 0    ibuprofen (ADVIL/MOTRIN) 200 MG tablet Take 3 tablets (600 mg) by mouth every 6 hours as needed for moderate pain (4-6)      lamoTRIgine (LAMICTAL) 150 MG tablet Take 150 mg by mouth 2 times daily      lamoTRIgine (LAMICTAL) 200 MG tablet Take 1 tablet by mouth 2 times daily      levothyroxine (SYNTHROID/LEVOTHROID) 50 MCG tablet TAKE ONE TABLET BY MOUTH EVERY DAY 90 tablet 2    lisinopril (ZESTRIL) 10 MG tablet Take 1 tablet (10 mg) by mouth daily 90 tablet 3    metFORMIN (GLUCOPHAGE) 500 MG tablet TAKE ONE TABLET BY MOUTH EVERY DAY WITH BREAKFAST 90 tablet 1    montelukast (SINGULAIR) 10 MG tablet TAKE ONE TABLET BY MOUTH AT BEDTIME 90 tablet 1     multivitamin, therapeutic (THERA-VIT) TABS tablet Take 1 tablet by mouth daily      naloxone (NARCAN) 4 MG/0.1ML nasal spray Spray 1 spray (4 mg) into one nostril alternating nostrils once as needed for opioid reversal every 2-3 minutes until assistance arrives 0.2 mL 0    nebivolol (BYSTOLIC) 5 MG tablet Take 1 tablet (5 mg) by mouth daily 30 tablet 11    nitroGLYcerin (NITROSTAT) 0.4 MG sublingual tablet PLACE ONE TABLET UNDER THE TONGUE AT THE 1ST SIGN OF ATTACK. IF PAIN IS UNRELIEVED OR WORSENED 5 MINUTES AFTER 1ST DOSE, PROMPT MEDICAL ASSISTANCE IS NEEDED. MAY REPEAT EVERY 5 MINUTES UNTIL PAIN IS RELIEVED. MAX OF THREE DOSES 25 tablet 4    nystatin (MYCOSTATIN) 191361 UNIT/GM external powder APPLY TOPICALLY TWICE A DAY AS NEEDED SKIN RASH 45 g 9    omeprazole (PRILOSEC) 20 MG DR capsule TAKE ONE CAPSULE BY MOUTH TWICE A  capsule 3    omeprazole (PRILOSEC) 40 MG DR capsule TAKE ONE CAPSULE BY MOUTH TWICE A DAY FOR 1 MONTH THEN TAKE TAKE ONE CAPSULE BY MOUTH EVERY DAY 60 capsule 2    ondansetron (ZOFRAN ODT) 4 MG ODT tab Take 1 tablet (4 mg) by mouth every 8 hours as needed for nausea 4 tablet 0    oxyCODONE (ROXICODONE) 5 MG tablet Take 1 tablet (5 mg) by mouth every 6 hours as needed for pain 12 tablet 0    polyethylene glycol (MIRALAX) 17 GM/Dose powder DISSOLVE 17 GRAMS (1 CAPFUL) AND DRINK BY MOUTH ONCE DAILY Strength: 17 GM/SCOOP 510 g 1    rosuvastatin (CRESTOR) 40 MG tablet Take 1 tablet (40 mg) by mouth daily 90 tablet 1    Semaglutide (RYBELSUS) 14 MG tablet TAKE ONE TABLET BY MOUTH EVERY DAY 90 tablet 1    senna-docusate (SENOKOT-S/PERICOLACE) 8.6-50 MG tablet Take 1-2 tablets by mouth 2 times daily 30 tablet 0    sucralfate (CARAFATE) 1 GM tablet TAKE ONE TABLET BY MOUTH FOUR TIMES A DAY AS NEEDED FOR NAUSEA 120 tablet 0    sulfamethoxazole-trimethoprim (BACTRIM DS) 800-160 MG tablet Take 1 tablet by mouth 2 times daily Take one tablet twice daily. For additional refills, please schedule a  follow-up appointment. 180 tablet 1    tamsulosin (FLOMAX) 0.4 MG capsule Take 1 capsule (0.4 mg) by mouth daily 30 capsule 0    terbinafine (LAMISIL) 1 % external cream APPLY TO AFFECTED AREA(S) TWO TIMES A DAY 30 g 2    triamcinolone (KENALOG) 0.025 % external ointment Apply topically 2 times daily 80 g 1    Vitamin D3 (CHOLECALCIFEROL) 125 MCG (5000 UT) tablet Take 1 tablet by mouth three times a week      VRAYLAR 6 MG capsule Take 6 mg by mouth daily         Soc Hx: reviewed  Fam Hx: reviewed        Chart documentation was completed, in part, with [employer] voice-recognition software. Even though reviewed, some grammatical, spelling, and word errors may remain.    Roro Low MD  Internal Medicine       Signed Electronically by: Roro Chawla MD

## 2024-07-10 ENCOUNTER — TELEPHONE (OUTPATIENT)
Dept: MEDSURG UNIT | Facility: CLINIC | Age: 54
End: 2024-07-10

## 2024-07-10 ENCOUNTER — OFFICE VISIT (OUTPATIENT)
Dept: FAMILY MEDICINE | Facility: CLINIC | Age: 54
End: 2024-07-10
Payer: COMMERCIAL

## 2024-07-10 VITALS
WEIGHT: 221 LBS | TEMPERATURE: 97.3 F | HEART RATE: 73 BPM | HEIGHT: 63 IN | RESPIRATION RATE: 18 BRPM | OXYGEN SATURATION: 97 % | BODY MASS INDEX: 39.16 KG/M2 | DIASTOLIC BLOOD PRESSURE: 71 MMHG | SYSTOLIC BLOOD PRESSURE: 141 MMHG

## 2024-07-10 DIAGNOSIS — E11.9 TYPE 2 DIABETES MELLITUS WITHOUT COMPLICATION, WITHOUT LONG-TERM CURRENT USE OF INSULIN (H): ICD-10-CM

## 2024-07-10 DIAGNOSIS — M79.661 PAIN OF RIGHT LOWER LEG: ICD-10-CM

## 2024-07-10 DIAGNOSIS — E66.01 CLASS 3 SEVERE OBESITY DUE TO EXCESS CALORIES WITH SERIOUS COMORBIDITY AND BODY MASS INDEX (BMI) OF 40.0 TO 44.9 IN ADULT (H): ICD-10-CM

## 2024-07-10 DIAGNOSIS — Z01.818 PRE-OP EXAM: Primary | ICD-10-CM

## 2024-07-10 DIAGNOSIS — E66.813 CLASS 3 SEVERE OBESITY DUE TO EXCESS CALORIES WITH SERIOUS COMORBIDITY AND BODY MASS INDEX (BMI) OF 40.0 TO 44.9 IN ADULT (H): ICD-10-CM

## 2024-07-10 LAB
ANION GAP SERPL CALCULATED.3IONS-SCNC: 9 MMOL/L (ref 7–15)
BUN SERPL-MCNC: 11 MG/DL (ref 6–20)
CALCIUM SERPL-MCNC: 9.2 MG/DL (ref 8.6–10)
CHLORIDE SERPL-SCNC: 105 MMOL/L (ref 98–107)
CREAT SERPL-MCNC: 0.78 MG/DL (ref 0.51–0.95)
DEPRECATED HCO3 PLAS-SCNC: 27 MMOL/L (ref 22–29)
EGFRCR SERPLBLD CKD-EPI 2021: 89 ML/MIN/1.73M2
GLUCOSE SERPL-MCNC: 116 MG/DL (ref 70–99)
HBA1C MFR BLD: 5.7 % (ref 0–5.6)
POTASSIUM SERPL-SCNC: 3.8 MMOL/L (ref 3.4–5.3)
SODIUM SERPL-SCNC: 141 MMOL/L (ref 135–145)

## 2024-07-10 PROCEDURE — 83036 HEMOGLOBIN GLYCOSYLATED A1C: CPT | Performed by: PREVENTIVE MEDICINE

## 2024-07-10 PROCEDURE — 80048 BASIC METABOLIC PNL TOTAL CA: CPT | Performed by: PREVENTIVE MEDICINE

## 2024-07-10 PROCEDURE — 36415 COLL VENOUS BLD VENIPUNCTURE: CPT | Performed by: PREVENTIVE MEDICINE

## 2024-07-10 PROCEDURE — 99214 OFFICE O/P EST MOD 30 MIN: CPT | Performed by: PREVENTIVE MEDICINE

## 2024-07-10 ASSESSMENT — PAIN SCALES - GENERAL: PAINLEVEL: EXTREME PAIN (8)

## 2024-07-10 NOTE — TELEPHONE ENCOUNTER
Chart was reviewed for an upcoming appointment.  Spoke with the patient and went over prep instructions, verbalized understanding to have nothing to eat to drink after midnight tonight, her sister is dropping her off and picking her up, was instructed to hold Metformin, check a BG level in the AM and to hold her AM dose of Lasix, she is not on blood thinners.  Patient verbalized understanding of all instructions, she is getting her H&P done today on 7/10/24

## 2024-07-10 NOTE — PROGRESS NOTES
Preoperative Evaluation  [address] [address]  Phone: [phone]  Primary Provider: Roro Chawla MD  Pre-op Performing Provider: Ciara Adler MD  Jul 10, 2024             7/10/2024   Surgical Information   What procedure is being done? MRI Lumbar spine    Facility or Hospital where procedure/surgery will be performed: [facility]   Who is doing the procedure / surgery? cant remember name   Date of surgery / procedure: 7/11/2024   Time of surgery / procedure: 10:40a   Where do you plan to recover after surgery? at home with family      Anesthesia: General    Fax number for surgical facility: Note does not need to be faxed, will be available electronically in Epic.    Assessment & Plan     The proposed surgical procedure is considered LOW risk.    Pre-op exam  -procedure scheduled for tomorrow 7/11/24  - Basic metabolic panel  (Ca, Cl, CO2, Creat, Gluc, K, Na, BUN)  - Hemoglobin A1c    Pain of right lower leg  -reason for proceeding with MRI of the Lumbar spine     Type 2 diabetes mellitus without complication, without long-term current use of insulin (H)  - Basic metabolic panel  (Ca, Cl, CO2, Creat, Gluc, K, Na, BUN)  - Hemoglobin A1c    Lab Results   Component Value Date    A1C 5.8 04/09/2024    A1C 5.5 08/17/2022    A1C 5.8 01/24/2022    A1C 5.6 10/16/2020    A1C 6.0 02/20/2020    A1C 5.6 05/10/2019    A1C 5.4 03/21/2013         Class 3 severe obesity due to excess calories with serious comorbidity and body mass index (BMI) of 40.0 to 44.9 in adult (H)  Wt Readings from Last 2 Encounters:   07/10/24 100.2 kg (221 lb)   04/09/24 98.7 kg (217 lb 8 oz)        Risks and Recommendations  The patient has the following additional risks and recommendations for perioperative complications:  Diabetes:  - Patient is not on insulin therapy: regular NPO guidelines can be followed.   Pulmonary:    - Active nicotine user,  advised smoking cessation  Social and Substance:    - Patient is taking medications for chronic pain   - Active nicotine user, advised smoking cessation    Antiplatelet or Anticoagulation Medication Instructions   - Patient is on no antiplatelet or anticoagulation medications.    Additional Medication Instructions  Take all scheduled medications on the day of surgery EXCEPT for modifications listed below:   - metformin: DO NOT TAKE day of surgery.   - GLP-1 oral semaglutide: DO NOT TAKE 7 days before surgery, too late to follow this since procedure is tomorrow    - Antiepileptics: Continue without modification.   - Benzodiazepines: Continue without modification.   - LABA, inhaled corticosteroid, long-acting anticholinergics: Continue without modification.   - rescue Inhaler: Continue PRN. Bring to hospital on the day of surgery.  -statins: continue  -Beta blockers: continue    Recommendation  Approval given to proceed with proposed procedure pending review of diagnostic evaluation.    Malena Posada is a 54 year old, presenting for the following:  Pre-Op Exam          7/10/2024     9:20 AM   Additional Questions   Roomed by Gloira   Accompanied by self     Via the Health Maintenance questionnaire, the patient has reported the following services have been completed -Eye Exam: [facility] 2024-04-17, this information has been sent to the abstraction team.      HPI related to upcoming procedure: 54 year old female scheduled to undergo an MRI of the Lumbar spine due to right leg pain.         7/10/2024   Pre-Op Questionnaire   Have you ever had a heart attack or stroke? No   Have you ever had surgery on your heart or blood vessels, such as a stent placement, a coronary artery bypass, or surgery on an artery in your head, neck, heart, or legs? No   Do you have chest pain with activity? (!) YES Normal nuclear stress test in 2022    Do you have a history of heart failure? No   Do you currently have a cold,  bronchitis or symptoms of other infection? No   Do you have a cough, shortness of breath, or wheezing? No   Do you or anyone in your family have previous history of blood clots? No   Do you or does anyone in your family have a serious bleeding problem such as prolonged bleeding following surgeries or cuts? No   Have you ever had problems with anemia or been told to take iron pills? No   Have you had any abnormal blood loss such as black, tarry or bloody stools, or abnormal vaginal bleeding? No   Have you ever had a blood transfusion? No   Are you willing to have a blood transfusion if it is medically needed before, during, or after your surgery? Yes   Have you or any of your relatives ever had problems with anesthesia? No   Do you have sleep apnea, excessive snoring or daytime drowsiness? No   Do you have any artifical heart valves or other implanted medical devices like a pacemaker, defibrillator, or continuous glucose monitor? No   Do you have artificial joints? No   Are you allergic to latex? No     Does have nausea and post op vomiting  Not able to do much physical activity due to ongoing back issues   No syncope  No seizures  No blood clots      Health Care Directive  Patient does not have a Health Care Directive or Living Will: Discussed advance care planning with patient; information given to patient to review.    Preoperative Review of    reviewed - controlled substances reflected in medication list.      Status of Chronic Conditions:  See problem list for active medical problems.  Problems all longstanding and stable, except as noted/documented.  See ROS for pertinent symptoms related to these conditions.    Patient Active Problem List    Diagnosis Date Noted    Ureteral stone with hydronephrosis 01/27/2024     Priority: Medium    Class 2 severe obesity due to excess calories with serious comorbidity in adult (H) 07/19/2023     Priority: Medium    PONV (postoperative nausea and vomiting) 05/07/2023      Priority: Medium    COPD (chronic obstructive pulmonary disease) (H) 05/07/2023     Priority: Medium    Obesity (BMI 30-39.9) 05/07/2023     Priority: Medium    Bipolar disorder (H) 05/07/2023     Priority: Medium    Benign essential hypertension 05/07/2023     Priority: Medium    Pure hypercholesterolemia 05/07/2023     Priority: Medium    Hypothyroidism 05/07/2023     Priority: Medium    Chronic abdominal pain 05/07/2023     Priority: Medium    Chronic constipation 05/07/2023     Priority: Medium    Impaired fasting glucose 05/07/2023     Priority: Medium      Past Medical History:   Diagnosis Date    Benign essential hypertension     Bipolar disorder (H)     Chronic abdominal pain     Chronic constipation     COPD (chronic obstructive pulmonary disease) (H)     Hypothyroidism     Impaired fasting glucose     Kidney stone     Obesity (BMI 30-39.9)     PONV (postoperative nausea and vomiting)     Pre-diabetes     Pure hypercholesterolemia      Past Surgical History:   Procedure Laterality Date    ANESTHESIA OUT OF OR MRI N/A 10/07/2021    Procedure: ANESTHESIA OUT OF OR MRI;  Surgeon: GENERIC ANESTHESIA PROVIDER;  Location:  OR    ANESTHESIA OUT OF OR MRI N/A 5/11/2023    Procedure: MRI OF NECK WITH AND WITHOUT CONTRAST;  Surgeon: GENERIC ANESTHESIA PROVIDER;  Location:  OR    ANESTHESIA OUT OF OR MRI Left 6/26/2023    Procedure: Anesthesia out of OR MRI;  Surgeon: GENERIC ANESTHESIA PROVIDER;  Location: [facility] OR    APPENDECTOMY      ARTHROSCOPY SHOULDER DECOMPRESSION Left 10/21/2015    Procedure: ARTHROSCOPY SHOULDER DECOMPRESSION;  Surgeon: Julien Milian MD;  Location: [facility] OR    BIOPSY ARTERY TEMPORAL Left 03/11/2020    Procedure: LEFT TEMPORAL ARTERY BIOPSY;  Surgeon: Ibeth Conner MD;  Location: [facility] OR    BRONCHIAL THERMOPLASTY N/A 11/14/2014    Procedure: BRONCHIAL THERMOPLASTY;  Surgeon: Ward Whitaker MD;  [facility]: [facility]    BRONCHIAL THERMOPLASTY N/A 12/19/2014    Procedure: BRONCHIAL  THERMOPLASTY;  Surgeon: Ward Whitaker MD;  [facility]    BRONCHIAL THERMOPLASTY N/A 02/06/2015    Procedure: BRONCHIAL THERMOPLASTY;  Surgeon: Ward Whitaker MD;  [facility]    COLONOSCOPY N/A 11/26/2018    Procedure: COLONOSCOPY ([facility]);  Surgeon: Marshall Oakes MD;  Location:  OR    COLONOSCOPY N/A 10/22/2020    Procedure: Colonoscopy;  Surgeon: Marshall Mccormick MD;  Location:  OR    COLONOSCOPY N/A 01/20/2023    Procedure: COLONOSCOPY, FLEXIBLE, WITH LESION REMOVAL USING SNARE;  Surgeon: Carson Domínguez MD;  Location:  GI    COMBINED CYSTOSCOPY, RETROGRADES, URETEROSCOPY, LASER HOLMIUM LITHOTRIPSY URETER(S), INSERT STENT Right 7/19/2023    Procedure: Cystoscopy, Right Ureteroscopy, Right Retrograde Pyelogram;  Surgeon: Sammy Herndon MD;  Location: [facility] OR    DISCECTOMY, FUSION CERVICAL ANTERIOR ONE LEVEL, COMBINED N/A 05/01/2018    Procedure: COMBINED DISCECTOMY, FUSION CERVICAL ANTERIOR ONE LEVEL;  1.  C5-C6 anterior cervical diskectomy and fusion.    2.  C5-C6 application of intervertebral biomechanical device for interbody fusion purposes.    3.  C5-C6 anterior instrumentation using the standard Kristin InViZia 24 mm plate with four associated bone screws, 12 mm in length.  Inferior screws are fixed.  Superior screws are va    ESOPHAGOSCOPY, GASTROSCOPY, DUODENOSCOPY (EGD), COMBINED N/A 10/22/2020    Procedure: Esophagoscopy, gastroscopy, duodenoscopy with biopsies;  Surgeon: Marshall Mccormick MD;  Location:  OR    ESOPHAGOSCOPY, GASTROSCOPY, DUODENOSCOPY (EGD), COMBINED N/A 06/17/2021    Procedure: ESOPHAGOGASTRODUODENOSCOPY, WITH BIOPSY;  Surgeon: Darrel Damon MD;  Location:  GI    EXCISE MASS NECK Left 02/01/2023    Procedure: exploration of  NECK left;  Surgeon: Ibeth Conner MD;  Location:  OR    EXCISE MASS TRUNK Left 11/03/2021    Procedure: EXCISION LEFT SHOULDER LIPOMA;  Surgeon: Ibeth Conner MD;  Location:  OR     EXCISE NODE MEDIASTINAL  04/26/2013    Procedure: EXCISE NODE MEDIASTINAL;;  Surgeon: Av Peña MD;  Location: [facility] OR    LAPAROSCOPIC CHOLECYSTECTOMY N/A 10/19/2017    Procedure: LAPAROSCOPIC CHOLECYSTECTOMY;  LAPAROSCOPIC CHOLECYSTECTOMY;  Surgeon: Ney Jerry MD;  Location: [facility] OR    LARYNGOSCOPY, EXCISE VOCAL CORD LESION, COMBINED      THORACOSCOPY  04/26/2013    Procedure: THORACOSCOPY;  LEFT VIDEO ASSISTED THORACOSCOPY, RESECTION OF POSTERIOR MEDIASTINAL MASS;  Surgeon: Av Peañ MD;  Location: [facility] OR    TONSILLECTOMY & ADENOIDECTOMY       Current Outpatient Medications   Medication Sig Dispense Refill    ACCU-CHEK GUIDE test strip USE TO TEST BLOOD SUGAR ONCE A DAY OR AS DIRECTED 100 strip 1    acetaminophen (TYLENOL) 500 MG tablet Take 2 tablets (1,000 mg) by mouth every 6 hours as needed for pain      albuterol (PROAIR HFA/PROVENTIL HFA/VENTOLIN HFA) 108 (90 Base) MCG/ACT inhaler INHALE TWO PUFFS BY MOUTH EVERY 6 HOURS 6.7 g 0    albuterol (PROVENTIL) (2.5 MG/3ML) 0.083% neb solution INHALE ONE VIAL BY NEBULIZER EVERY 6 HOURS AS NEEDED FOR SHORTNESS OF BREATH OR WHEEZING Strength: (2.5 MG/3ML) 0.083% 75 mL 1    atorvastatin (LIPITOR) 80 MG tablet TAKE ONE TABLET BY MOUTH EVERY DAY 90 tablet 3    benzoyl peroxide (ACNE-CLEAR) 10 % external gel Apply topically 2 times daily as needed 90 g 1    blood glucose (ACCU-CHEK ALLYSON PLUS) test strip USE TO TEST BLOOD SUGAR ONCE DAILY OR AS DIRECTED 100 strip 3    blood glucose (NO BRAND SPECIFIED) lancets standard Use to test blood sugar 1 times daily or as directed. 100 Lancet 0    blood glucose (NO BRAND SPECIFIED) lancets standard Use to test blood sugar 1 times daily or as directed. 100 each 3    blood glucose (NO BRAND SPECIFIED) test strip Use to test blood sugar 1 times daily or as directed.  Dispense Accu-chek Allyson Plus or per patient insurance 100 strip 3    blood glucose monitoring (NO BRAND SPECIFIED) meter device kit Use  to test blood sugar 1 times daily or as directed.  Dispense Accu-chek Olinda Plus or per insurance preference 1 kit 0    blood glucose monitoring (SOFTCLIX) lancets USE TO TEST BLOOD SUGARS ONCE DAILY OR AS DIRECTED 100 each 1    budesonide-formoterol (SYMBICORT) 160-4.5 MCG/ACT Inhaler Inhale 2 puffs into the lungs 2 times daily 30.6 g 0    buPROPion (WELLBUTRIN XL) 150 MG 24 hr tablet TAKE ONE TABLET BY MOUTH EVERY MORNING 90 tablet 2    cetirizine (ZYRTEC) 10 MG tablet TAKE ONE TABLET BY MOUTH EVERY DAY AS NEEDED 90 tablet 1    clindamycin (CLINDAMAX) 1 % external gel Apply topically 2 times daily 60 g 3    diazepam (VALIUM) 10 MG tablet Take 1 tablet by mouth 2 times daily      dimenhyDRINATE (DRAMAMINE) 50 MG tablet Take 1 tablet (50 mg) by mouth every 6 hours as needed for other (nephrolithiasis) 30 tablet 0    dimenhyDRINATE (DRAMAMINE) 50 MG tablet Take 1 tablet (50 mg) by mouth nightly as needed for sleep 14 tablet 0    Emollient (CERAVE MOISTURIZING) CREA Externally apply 1 Application topically 2 times daily 453 g 11    eszopiclone (LUNESTA) 2 MG tablet       furosemide (LASIX) 20 MG tablet TAKE 1 TABLET BY MOUTH TWICE DAILY 180 tablet 3    gabapentin (NEURONTIN) 100 MG capsule Take 1 capsule (100 mg) by mouth 3 times daily 90 capsule 4    hydrocortisone 2.5 % cream APPLY TO AFFECTED AREA(S) TWO TIMES A DAY 30 g 1    hydrOXYzine HCl (ATARAX) 50 MG tablet TAKE TWO TABLETS BY MOUTH THREE TIMES A DAY AS NEEDED FOR ANXIETY 70 tablet 0    ibuprofen (ADVIL/MOTRIN) 200 MG tablet Take 3 tablets (600 mg) by mouth every 6 hours as needed for moderate pain (4-6)      lamoTRIgine (LAMICTAL) 150 MG tablet Take 150 mg by mouth 2 times daily      lamoTRIgine (LAMICTAL) 200 MG tablet Take 1 tablet by mouth 2 times daily      levothyroxine (SYNTHROID/LEVOTHROID) 50 MCG tablet TAKE ONE TABLET BY MOUTH EVERY DAY 90 tablet 2    lisinopril (ZESTRIL) 10 MG tablet Take 1 tablet (10 mg) by mouth daily 90 tablet 3    metFORMIN  (GLUCOPHAGE) 500 MG tablet TAKE ONE TABLET BY MOUTH EVERY DAY WITH BREAKFAST 90 tablet 1    metroNIDAZOLE (METROGEL) 0.75 % external gel Apply topically 2 times daily 45 g 2    montelukast (SINGULAIR) 10 MG tablet TAKE ONE TABLET BY MOUTH AT BEDTIME 90 tablet 1    multivitamin, therapeutic (THERA-VIT) TABS tablet Take 1 tablet by mouth daily      naloxone (NARCAN) 4 MG/0.1ML nasal spray Spray 1 spray (4 mg) into one nostril alternating nostrils once as needed for opioid reversal every 2-3 minutes until assistance arrives 0.2 mL 0    nebivolol (BYSTOLIC) 5 MG tablet Take 1 tablet (5 mg) by mouth daily 30 tablet 11    nitroGLYcerin (NITROSTAT) 0.4 MG sublingual tablet PLACE ONE TABLET UNDER THE TONGUE AT THE 1ST SIGN OF ATTACK. IF PAIN IS UNRELIEVED OR WORSENED 5 MINUTES AFTER 1ST DOSE, PROMPT MEDICAL ASSISTANCE IS NEEDED. MAY REPEAT EVERY 5 MINUTES UNTIL PAIN IS RELIEVED. MAX OF THREE DOSES 25 tablet 4    nystatin (MYCOSTATIN) 368220 UNIT/GM external powder APPLY TOPICALLY TWICE A DAY AS NEEDED SKIN RASH 45 g 9    omeprazole (PRILOSEC) 20 MG DR capsule TAKE ONE CAPSULE BY MOUTH TWICE A  capsule 3    omeprazole (PRILOSEC) 40 MG DR capsule TAKE ONE CAPSULE BY MOUTH TWICE A DAY FOR 1 MONTH THEN TAKE TAKE ONE CAPSULE BY MOUTH EVERY DAY 60 capsule 2    ondansetron (ZOFRAN ODT) 4 MG ODT tab Take 1 tablet (4 mg) by mouth every 8 hours as needed for nausea 4 tablet 0    oxyCODONE (ROXICODONE) 5 MG tablet Take 1 tablet (5 mg) by mouth every 6 hours as needed for pain 12 tablet 0    polyethylene glycol (MIRALAX) 17 GM/Dose powder DISSOLVE 17 GRAMS (1 CAPFUL) AND DRINK BY MOUTH ONCE DAILY Strength: 17 GM/SCOOP 510 g 1    rosuvastatin (CRESTOR) 40 MG tablet Take 1 tablet (40 mg) by mouth daily 90 tablet 1    Semaglutide (RYBELSUS) 14 MG tablet TAKE ONE TABLET BY MOUTH EVERY DAY 90 tablet 1    senna-docusate (SENOKOT-S/PERICOLACE) 8.6-50 MG tablet Take 1-2 tablets by mouth 2 times daily 30 tablet 0    sucralfate (CARAFATE)  "1 GM tablet TAKE ONE TABLET BY MOUTH FOUR TIMES A DAY AS NEEDED FOR NAUSEA 120 tablet 0    terbinafine (LAMISIL) 1 % external cream APPLY TO AFFECTED AREA(S) TWO TIMES A DAY 30 g 2    triamcinolone (KENALOG) 0.025 % external ointment Apply topically 2 times daily 80 g 1    Vitamin D3 (CHOLECALCIFEROL) 125 MCG (5000 UT) tablet Take 1 tablet by mouth three times a week      VRAYLAR 6 MG capsule Take 6 mg by mouth daily         Allergies   Allergen Reactions    Codeine Nausea and Vomiting    Cyclobenzaprine Nausea and Vomiting    Gabapentin Rash    Hydrocodone Nausea and Vomiting    Sulfa Antibiotics Nausea and Vomiting        Social History     Tobacco Use    Smoking status: Every Day     Current packs/day: 0.30     Average packs/day: 0.3 packs/day for 30.0 years (9.0 ttl pk-yrs)     Types: Cigarettes     Passive exposure: Past    Smokeless tobacco: Never   Substance Use Topics    Alcohol use: Not Currently     Family History   Problem Relation Age of Onset    Myocardial Infarction Mother     Hypertension Mother     Cerebrovascular Disease Mother      History   Drug Use No             Review of Systems  Constitutional, HEENT, cardiovascular, pulmonary, gi and gu systems are negative, except as otherwise noted.    Objective    BP (!) 141/71 (BP Location: Left arm, Patient Position: Sitting, Cuff Size: Adult Regular)   Pulse 73   Temp 97.3  F (36.3  C) (Oral)   Resp 18   Ht 1.6 m (5' 3\")   Wt 100.2 kg (221 lb)   LMP 01/01/2014 (Approximate)   SpO2 97%   BMI 39.15 kg/m     Estimated body mass index is 39.15 kg/m  as calculated from the following:    Height as of this encounter: 1.6 m (5' 3\").    Weight as of this encounter: 100.2 kg (221 lb).  Physical Exam  GENERAL APPEARANCE: healthy, alert and no distress  EYES: Eyes grossly normal to inspection and conjunctivae and sclerae normal  HENT: mouth without ulcers or lesions  NECK: no adenopathy and trachea midline and normal to palpation  RESP: lungs clear to " auscultation - no rales, rhonchi or wheezes  CV: regular rates and rhythm, normal S1 S2  ABDOMEN: soft, non-tender and no rebound or guarding   MS: extremities normal- no gross deformities noted  SKIN: no suspicious lesions or rashes  NEURO: Normal strength and tone, mentation intact and speech normal  PSYCH: mentation appears normal      Recent Labs   Lab Test 04/09/24  1039 10/10/23  1447   HGB 14.6  --      --     140   POTASSIUM 4.1 4.5   CR 0.80 1.33*   A1C 5.8*  --         Diagnostics  Labs pending at this time.  Results will be reviewed when available.   No EKG required, no history of coronary heart disease, significant arrhythmia, peripheral arterial disease or other structural heart disease.    Revised Cardiac Risk Index (RCRI)  The patient has the following serious cardiovascular risks for perioperative complications:   - No serious cardiac risks = 0 points     RCRI Interpretation: 0 points: Class I (very low risk - 0.4% complication rate)         Signed Electronically by: Ciara Adler MD MPH    Copy of this evaluation report is provided to requesting physician.

## 2024-07-10 NOTE — RESULT ENCOUNTER NOTE
Swetha,     Three month glucose number Hemoglobin A1C is at goal for you.     Please do not hesitate to call us at [phone] if you have any questions or concerns.    Thank you,    Ciara Adler MD MPH

## 2024-07-11 NOTE — RESULT ENCOUNTER NOTE
Swetha, your test results were within normal limits.  Preop labs are within normal limits.  Electrolytes, nonfasting glucose, and kidney function are normal.    Please do not hesitate to call us at [phone] if you have any questions or concerns.    Thank you,    Ciara Adler MD MPH

## 2024-07-15 ENCOUNTER — TELEPHONE (OUTPATIENT)
Dept: PHARMACY | Facility: OTHER | Age: 54
End: 2024-07-15
Payer: COMMERCIAL

## 2024-07-15 NOTE — TELEPHONE ENCOUNTER
MTM Recruitment: [employer]     Referral outreach attempt #2 on July 15, 2024      Outcome: call attempted, could not leave voicemail     Jaziel CamposD  Medication Therapy Management Pharmacist  Voicemail: [phone]

## 2024-07-23 ENCOUNTER — TELEPHONE (OUTPATIENT)
Dept: INTERNAL MEDICINE | Facility: CLINIC | Age: 54
End: 2024-07-23
Payer: COMMERCIAL

## 2024-07-24 ENCOUNTER — TELEPHONE (OUTPATIENT)
Dept: INTERNAL MEDICINE | Facility: CLINIC | Age: 54
End: 2024-07-24
Payer: COMMERCIAL

## 2024-07-29 DIAGNOSIS — F41.9 ANXIETY: ICD-10-CM

## 2024-07-29 RX ORDER — HYDROXYZINE HYDROCHLORIDE 50 MG/1
TABLET, FILM COATED ORAL
Qty: 70 TABLET | Refills: 6 | Status: SHIPPED | OUTPATIENT
Start: 2024-07-29

## 2024-07-30 DIAGNOSIS — K21.9 GASTROESOPHAGEAL REFLUX DISEASE WITHOUT ESOPHAGITIS: ICD-10-CM

## 2024-07-30 DIAGNOSIS — J44.9 COPD, MODERATE (H): ICD-10-CM

## 2024-07-30 RX ORDER — OMEPRAZOLE 40 MG/1
CAPSULE, DELAYED RELEASE ORAL
Qty: 60 CAPSULE | Refills: 2 | Status: SHIPPED | OUTPATIENT
Start: 2024-07-30

## 2024-07-30 RX ORDER — CETIRIZINE HYDROCHLORIDE 10 MG/1
10 TABLET ORAL DAILY PRN
Qty: 90 TABLET | Refills: 1 | Status: SHIPPED | OUTPATIENT
Start: 2024-07-30

## 2024-07-30 NOTE — TELEPHONE ENCOUNTER
Please update Omeprazole Sig: TAKE ONE CAPSULE BY MOUTH TWICE A DAY FOR 1 MONTH THEN TAKE ONE CAPSULE EVERY DAY   
(4) excellent

## 2024-08-01 ENCOUNTER — PATIENT OUTREACH (OUTPATIENT)
Dept: INTERNAL MEDICINE | Facility: CLINIC | Age: 54
End: 2024-08-01
Payer: COMMERCIAL

## 2024-08-01 ENCOUNTER — TELEPHONE (OUTPATIENT)
Dept: INTERNAL MEDICINE | Facility: CLINIC | Age: 54
End: 2024-08-01
Payer: COMMERCIAL

## 2024-08-01 NOTE — TELEPHONE ENCOUNTER
Patient Quality Outreach    Patient is due for the following:   Hypertension -  Hypertension follow-up visit      Topic Date Due    Pneumococcal Vaccine (2 of 2 - PCV) 11/21/2019    Zoster (Shingles) Vaccine (2 of 2) 12/02/2020    COVID-19 Vaccine (6 - 2023-24 season) 09/01/2023       Next Steps:   Patient has upcoming appointment, these items will be addressed at that time.    Type of outreach:    Chart review performed, no outreach needed.      Questions for provider review:    None           Celia William MA

## 2024-08-01 NOTE — TELEPHONE ENCOUNTER
Patient calls c/o worsening yeast infection that now raw and is spreading from under her breasts down to her tummy. Patient reports the nystatin is not helping her and is aking for something else.     A video appointment was made for the patient for 8-2-2024    Patient can be reached at [phone]

## 2024-08-02 ENCOUNTER — VIRTUAL VISIT (OUTPATIENT)
Dept: INTERNAL MEDICINE | Facility: CLINIC | Age: 54
End: 2024-08-02
Payer: COMMERCIAL

## 2024-08-02 DIAGNOSIS — L71.9 ROSACEA: ICD-10-CM

## 2024-08-02 DIAGNOSIS — I10 BENIGN ESSENTIAL HYPERTENSION: ICD-10-CM

## 2024-08-02 DIAGNOSIS — B37.2 MUCOCUTANEOUS CANDIDIASIS: Primary | ICD-10-CM

## 2024-08-02 PROCEDURE — 99214 OFFICE O/P EST MOD 30 MIN: CPT | Mod: 95 | Performed by: INTERNAL MEDICINE

## 2024-08-02 RX ORDER — CLOTRIMAZOLE 1 %
CREAM (GRAM) TOPICAL 2 TIMES DAILY
Qty: 30 G | Refills: 0 | Status: SHIPPED | OUTPATIENT
Start: 2024-08-02

## 2024-08-02 NOTE — PROGRESS NOTES
"Swetha is a 54 year old who is being evaluated via a billable video visit.    How would you like to obtain your AVS? MyChart  If the video visit is dropped, the invitation should be resent by: Text to cell phone: [phone]  Will anyone else be joining your video visit? No      Assessment & Plan     Mucocutaneous candidiasis  Symptoms of burning with tenderness under the breasts.  Erythema noted at the inframammary area bilaterally.  Recommendations to minimize moisture buildup via daily clean and thorough dryinging as well as use of absorbent material or clothing to help separate the skin to skin contact discussed with the patient.  Has been using nystatin powder with minimal improvement of symptoms.  Use of clotrimazole cream 2 times a day.  - clotrimazole (LOTRIMIN) 1 % external cream; Apply topically 2 times daily    Benign essential hypertension  Continue with current antihypertensive regimen.    Rosacea  Continue use of metronidazole gel.Avoidance of triggers including alcohol, spicy foods and sunlight exposure [via regular use of sunscreen-patient does endorse not using sunscreen] discussed with the patient.  Has an upcoming dermatology visit in around 1 month.          BMI  Estimated body mass index is 39.15 kg/m  as calculated from the following:    Height as of 7/10/24: 1.6 m (5' 3\").    Weight as of 7/10/24: 100.2 kg (221 lb).             Subjective   Swetha is a 54 year old, presenting for the following health issues:  Infection          History of Present Illness       Reason for visit:  Yeast infection under breast and spreading    She eats 0-1 servings of fruits and vegetables daily.She consumes 0 sweetened beverage(s) daily.She exercises with enough effort to increase her heart rate 9 or less minutes per day.  She exercises with enough effort to increase her heart rate 3 or less days per week.   She is taking medications regularly.       Symptoms of burning with tenderness under the " breasts        Review of Systems  Constitutional, HEENT, cardiovascular, pulmonary, gi and gu systems are negative, except as otherwise noted.      Objective           Vitals:  No vitals were obtained today due to virtual visit.    Physical Exam   GENERAL: alert and no distress  EYES: Eyes grossly normal to inspection.  No discharge or erythema, or obvious scleral/conjunctival abnormalities.  RESP: No audible wheeze, cough, or visible cyanosis.    SKIN:Erythema noted at the inframammary area bilaterally.  NEURO: Cranial nerves grossly intact.  Mentation and speech appropriate for age.  PSYCH: Appropriate affect, tone, and pace of words          Video-Visit Details    Type of service:  Video Visit   Originating Location (pt. Location): Home    Distant Location (provider location):  On-site  Platform used for Video Visit: Lexx  Signed Electronically by: Maude Pat MD

## 2024-08-06 DIAGNOSIS — J44.9 COPD, MODERATE (H): ICD-10-CM

## 2024-08-06 DIAGNOSIS — J30.89 DUST ALLERGY: ICD-10-CM

## 2024-08-06 RX ORDER — MONTELUKAST SODIUM 10 MG/1
TABLET ORAL
Qty: 90 TABLET | Refills: 3 | Status: SHIPPED | OUTPATIENT
Start: 2024-08-06

## 2024-08-15 RX ORDER — MELOXICAM 15 MG/1
15 TABLET ORAL
COMMUNITY
Start: 2024-08-01

## 2024-08-15 RX ORDER — CLONAZEPAM 1 MG/1
1 TABLET ORAL
COMMUNITY
Start: 2024-07-02

## 2024-08-19 ENCOUNTER — OFFICE VISIT (OUTPATIENT)
Dept: INTERNAL MEDICINE | Facility: CLINIC | Age: 54
End: 2024-08-19
Payer: COMMERCIAL

## 2024-08-19 VITALS
DIASTOLIC BLOOD PRESSURE: 85 MMHG | RESPIRATION RATE: 20 BRPM | BODY MASS INDEX: 39.18 KG/M2 | OXYGEN SATURATION: 93 % | HEIGHT: 63 IN | TEMPERATURE: 98.1 F | WEIGHT: 221.1 LBS | HEART RATE: 72 BPM | SYSTOLIC BLOOD PRESSURE: 138 MMHG

## 2024-08-19 DIAGNOSIS — J44.9 CHRONIC OBSTRUCTIVE PULMONARY DISEASE, UNSPECIFIED COPD TYPE (H): ICD-10-CM

## 2024-08-19 DIAGNOSIS — M54.41 CHRONIC BILATERAL LOW BACK PAIN WITH RIGHT-SIDED SCIATICA: ICD-10-CM

## 2024-08-19 DIAGNOSIS — E66.01 CLASS 2 SEVERE OBESITY DUE TO EXCESS CALORIES WITH SERIOUS COMORBIDITY AND BODY MASS INDEX (BMI) OF 37.0 TO 37.9 IN ADULT (H): ICD-10-CM

## 2024-08-19 DIAGNOSIS — E66.812 CLASS 2 SEVERE OBESITY DUE TO EXCESS CALORIES WITH SERIOUS COMORBIDITY AND BODY MASS INDEX (BMI) OF 37.0 TO 37.9 IN ADULT (H): ICD-10-CM

## 2024-08-19 DIAGNOSIS — I10 BENIGN ESSENTIAL HYPERTENSION: ICD-10-CM

## 2024-08-19 DIAGNOSIS — G89.29 CHRONIC BILATERAL LOW BACK PAIN WITH RIGHT-SIDED SCIATICA: ICD-10-CM

## 2024-08-19 DIAGNOSIS — Z01.818 PREOPERATIVE EXAMINATION: Primary | ICD-10-CM

## 2024-08-19 PROCEDURE — G2211 COMPLEX E/M VISIT ADD ON: HCPCS | Performed by: NURSE PRACTITIONER

## 2024-08-19 PROCEDURE — 93000 ELECTROCARDIOGRAM COMPLETE: CPT | Performed by: NURSE PRACTITIONER

## 2024-08-19 PROCEDURE — 99214 OFFICE O/P EST MOD 30 MIN: CPT | Performed by: NURSE PRACTITIONER

## 2024-08-19 ASSESSMENT — ENCOUNTER SYMPTOMS
VOMITING: 0
BACK PAIN: 1
SHORTNESS OF BREATH: 0
COUGH: 0
FEVER: 0
DIARRHEA: 0
ABDOMINAL PAIN: 0
CHILLS: 0
NAUSEA: 0

## 2024-08-19 NOTE — PATIENT INSTRUCTIONS
How to Take Your Medication Before Surgery  Preoperative Medication Instructions   Antiplatelet or Anticoagulation Medication Instructions   - Bleeding risk is low for this procedure (e.g. dental, skin, cataract).    Additional Medication Instructions   - ACE/ARB: DO NOT TAKE on day of surgery (minimum 11 hours for general anesthesia).   - Beta Blockers: Continue taking on the day of surgery.   - Statins: Continue taking on the day of surgery.    - metformin: DO NOT TAKE day of surgery.   - GLP-1 oral semaglutide: DO NOT TAKE 7 days before surgery

## 2024-08-19 NOTE — PROGRESS NOTES
Preoperative Evaluation  [place] [address]  [address]  [facility] [address]  Phone: [phone]  Primary Provider: Roro Chawla MD  Pre-op Performing Provider: Elizabeth Gong, YOUNG  Aug 19, 2024             8/19/2024   Surgical Information   What procedure is being done? MRI with anesthesia    Facility or Hospital where procedure/surgery will be performed: [facility]   Who is doing the procedure / surgery? dr mandujano [facility]   Date of surgery / procedure: 08/22/24   Time of surgery / procedure: 6.00 am show up time   Where do you plan to recover after surgery? at home with family        Fax number for surgical facility: Note does not need to be faxed, will be available electronically in Epic.    Assessment & Plan     The proposed surgical procedure is considered LOW risk.    Preoperative examination    - EKG 12-lead complete w/read - Clinics    Chronic bilateral low back pain with right-sided sciatica  MRI to be done     Benign essential hypertension  Well controlled   - EKG 12-lead complete w/read - Clinics    Class 2 severe obesity due to excess calories with serious comorbidity and body mass index (BMI) of 37.0 to 37.9 in adult (H)    - EKG 12-lead complete w/read - Clinics    Chronic obstructive pulmonary disease, unspecified COPD type (H)  -stable, uses inhaler  - EKG 12-lead complete w/read - Clinics    Diabetes  -well controlled, last A1c 5.7       Risks and Recommendations  The patient has the following additional risks and recommendations for perioperative complications:  Diabetes:  - Patient is not on insulin therapy: regular NPO guidelines can be followed.   Pulmonary:    - Incentive spirometry post-op   - Active nicotine user, advised smoking cessation    Antiplatelet or Anticoagulation Medication Instructions   - Patient is on no antiplatelet or anticoagulation medications.    Additional Medication Instructions   - ACE/ARB: DO NOT TAKE on  day of surgery (minimum 11 hours for general anesthesia).   - Beta Blockers: Continue taking on the day of surgery.   - Statins: Continue taking on the day of surgery.    - metformin: DO NOT TAKE day of surgery.   - GLP-1 oral semaglutide: DO NOT TAKE 7 days before surgery    Recommendation  Approval given to proceed with proposed procedure, without further diagnostic evaluation.    Malena Posada is a 54 year old, presenting for the following:  Pre-Op Exam          8/19/2024     3:37 PM   Additional Questions   Roomed by azamit   Accompanied by self         8/19/2024     3:37 PM   Patient Reported Additional Medications   Patient reports taking the following new medications none     HPI related to upcoming procedure:   Chronic lower back pain with right sided sciatica.  Planning for MRI to further evaluate.  Needs sedation for MRI.         8/19/2024   Pre-Op Questionnaire   Have you ever had a heart attack or stroke? No   Have you ever had surgery on your heart or blood vessels, such as a stent placement, a coronary artery bypass, or surgery on an artery in your head, neck, heart, or legs? No   Do you have chest pain with activity? No   Do you have a history of heart failure? No   Do you currently have a cold, bronchitis or symptoms of other infection? No   Do you have a cough, shortness of breath, or wheezing? No   Do you or anyone in your family have previous history of blood clots? No   Do you or does anyone in your family have a serious bleeding problem such as prolonged bleeding following surgeries or cuts? No   Have you ever had problems with anemia or been told to take iron pills? No   Have you had any abnormal blood loss such as black, tarry or bloody stools, or abnormal vaginal bleeding? No   Have you ever had a blood transfusion? No   Are you willing to have a blood transfusion if it is medically needed before, during, or after your surgery? Yes   Have you or any of your relatives ever had  problems with anesthesia? No   Do you have sleep apnea, excessive snoring or daytime drowsiness? No   Do you have any artifical heart valves or other implanted medical devices like a pacemaker, defibrillator, or continuous glucose monitor? No   Do you have artificial joints? No   Are you allergic to latex? No        Health Care Directive  Patient does not have a Health Care Directive or Living Will: Discussed advance care planning with patient; however, patient declined at this time.    Preoperative Review of    reviewed - controlled substances reflected in medication list.          Patient Active Problem List    Diagnosis Date Noted    Ureteral stone with hydronephrosis 01/27/2024     Priority: Medium    Class 2 severe obesity due to excess calories with serious comorbidity in adult (H) 07/19/2023     Priority: Medium    PONV (postoperative nausea and vomiting) 05/07/2023     Priority: Medium    COPD (chronic obstructive pulmonary disease) (H) 05/07/2023     Priority: Medium    Obesity (BMI 30-39.9) 05/07/2023     Priority: Medium    Bipolar disorder (H) 05/07/2023     Priority: Medium    Benign essential hypertension 05/07/2023     Priority: Medium    Pure hypercholesterolemia 05/07/2023     Priority: Medium    Hypothyroidism 05/07/2023     Priority: Medium    Chronic abdominal pain 05/07/2023     Priority: Medium    Chronic constipation 05/07/2023     Priority: Medium    Impaired fasting glucose 05/07/2023     Priority: Medium      Past Medical History:   Diagnosis Date    Benign essential hypertension     Bipolar disorder (H)     Chronic abdominal pain     Chronic constipation     COPD (chronic obstructive pulmonary disease) (H)     Hypothyroidism     Impaired fasting glucose     Kidney stone     Obesity (BMI 30-39.9)     PONV (postoperative nausea and vomiting)     Pre-diabetes     Pure hypercholesterolemia      Past Surgical History:   Procedure Laterality Date    ANESTHESIA OUT OF OR MRI N/A 10/07/2021     Procedure: ANESTHESIA OUT OF OR MRI;  Surgeon: GENERIC ANESTHESIA PROVIDER;  Location: [facility]    ANESTHESIA OUT OF OR MRI N/A 5/11/2023    Procedure: MRI OF NECK WITH AND WITHOUT CONTRAST;  Surgeon: GENERIC ANESTHESIA PROVIDER;  Location: [facility]    ANESTHESIA OUT OF OR MRI Left 6/26/2023    Procedure: Anesthesia out of OR MRI;  Surgeon: GENERIC ANESTHESIA PROVIDER;  Location: [facility]    APPENDECTOMY      ARTHROSCOPY SHOULDER DECOMPRESSION Left 10/21/2015    Procedure: ARTHROSCOPY SHOULDER DECOMPRESSION;  Surgeon: Julien Milian MD;  Location: [facility] OR    BIOPSY ARTERY TEMPORAL Left 03/11/2020    Procedure: LEFT TEMPORAL ARTERY BIOPSY;  Surgeon: Ibeth Conner MD;  Location: [facility]    BRONCHIAL THERMOPLASTY N/A 11/14/2014    Procedure: BRONCHIAL THERMOPLASTY;  Surgeon: Ward Whitaker MD;  [facility]    BRONCHIAL THERMOPLASTY N/A 12/19/2014    Procedure: BRONCHIAL THERMOPLASTY;  Surgeon: Ward Whitaker MD;  [facility]    BRONCHIAL THERMOPLASTY N/A 02/06/2015    Procedure: BRONCHIAL THERMOPLASTY;  Surgeon: Ward Whitaker MD;  [facility]    COLONOSCOPY N/A 11/26/2018    Procedure: COLONOSCOPY ([facility]);  Surgeon: Marshall Oakes MD;  Location:  OR    COLONOSCOPY N/A 10/22/2020    Procedure: Colonoscopy;  Surgeon: Marshall Mccormick MD;  Location:  OR    COLONOSCOPY N/A 01/20/2023    Procedure: COLONOSCOPY, FLEXIBLE, WITH LESION REMOVAL USING SNARE;  Surgeon: Carson Domínguez MD;  Location:  GI    COMBINED CYSTOSCOPY, RETROGRADES, URETEROSCOPY, LASER HOLMIUM LITHOTRIPSY URETER(S), INSERT STENT Right 7/19/2023    Procedure: Cystoscopy, Right Ureteroscopy, Right Retrograde Pyelogram;  Surgeon: Sammy Herndon MD;  [facility]    DISCECTOMY, FUSION CERVICAL ANTERIOR ONE LEVEL, COMBINED N/A 05/01/2018    Procedure: COMBINED DISCECTOMY, FUSION CERVICAL ANTERIOR ONE LEVEL;  1.  C5-C6 anterior cervical diskectomy and fusion.    2.  C5-C6 application of  intervertebral biomechanical device for interbody fusion purposes.    3.  C5-C6 anterior instrumentation using the standard Kristin InViZia 24 mm plate with four associated bone screws, 12 mm in length.  Inferior screws are fixed.  Superior screws are va    ESOPHAGOSCOPY, GASTROSCOPY, DUODENOSCOPY (EGD), COMBINED N/A 10/22/2020    Procedure: Esophagoscopy, gastroscopy, duodenoscopy with biopsies;  Surgeon: Marshall Mccormick MD;  [facility]: [facility]    ESOPHAGOSCOPY, GASTROSCOPY, DUODENOSCOPY (EGD), COMBINED N/A 06/17/2021    Procedure: ESOPHAGOGASTRODUODENOSCOPY, WITH BIOPSY;  Surgeon: Darrel Damon MD;  Location: [facility]    EXCISE MASS NECK Left 02/01/2023    Procedure: exploration of  NECK left;  Surgeon: Ibeth Conner MD;  [facility]: [facility]    EXCISE MASS TRUNK Left 11/03/2021    Procedure: EXCISION LEFT SHOULDER LIPOMA;  Surgeon: Ibeth Conner MD;  Location: [facility]    EXCISE NODE MEDIASTINAL  04/26/2013    Procedure: EXCISE NODE MEDIASTINAL;;  Surgeon: Av Peña MD;  Location:  OR    LAPAROSCOPIC CHOLECYSTECTOMY N/A 10/19/2017    Procedure: LAPAROSCOPIC CHOLECYSTECTOMY;  LAPAROSCOPIC CHOLECYSTECTOMY;  Surgeon: Ney Jerry MD;  Location: [facility]    LARYNGOSCOPY, EXCISE VOCAL CORD LESION, COMBINED      THORACOSCOPY  04/26/2013    Procedure: THORACOSCOPY;  LEFT VIDEO ASSISTED THORACOSCOPY, RESECTION OF POSTERIOR MEDIASTINAL MASS;  Surgeon: Av Peña MD;  Location: [facility] OR    TONSILLECTOMY & ADENOIDECTOMY       Current Outpatient Medications   Medication Sig Dispense Refill    ACCU-CHEK GUIDE test strip USE TO TEST BLOOD SUGAR ONCE A DAY OR AS DIRECTED 100 strip 1    acetaminophen (TYLENOL) 500 MG tablet Take 2 tablets (1,000 mg) by mouth every 6 hours as needed for pain      albuterol (PROAIR HFA/PROVENTIL HFA/VENTOLIN HFA) 108 (90 Base) MCG/ACT inhaler INHALE TWO PUFFS BY MOUTH EVERY 6 HOURS (Patient taking differently: 2 puffs every 6 hours as needed INHALE TWO PUFFS BY  MOUTH EVERY 6 HOURS) 6.7 g 0    albuterol (PROVENTIL) (2.5 MG/3ML) 0.083% neb solution INHALE ONE VIAL BY NEBULIZER EVERY 6 HOURS AS NEEDED FOR SHORTNESS OF BREATH OR WHEEZING Strength: (2.5 MG/3ML) 0.083% 75 mL 1    benzoyl peroxide (ACNE-CLEAR) 10 % external gel Apply topically 2 times daily as needed 90 g 1    blood glucose (NO BRAND SPECIFIED) test strip Use to test blood sugar 1 times daily or as directed.  Dispense Accu-chek Olinda Plus or per patient insurance 100 strip 3    blood glucose monitoring (SOFTCLIX) lancets USE TO TEST BLOOD SUGARS ONCE DAILY OR AS DIRECTED 100 each 1    budesonide-formoterol (SYMBICORT) 160-4.5 MCG/ACT Inhaler Inhale 2 puffs into the lungs 2 times daily 30.6 g 0    buPROPion (WELLBUTRIN XL) 150 MG 24 hr tablet TAKE ONE TABLET BY MOUTH EVERY MORNING 90 tablet 2    cetirizine (ZYRTEC) 10 MG tablet TAKE ONE TABLET BY MOUTH EVERY DAY AS NEEDED 90 tablet 1    clindamycin (CLINDAMAX) 1 % external gel Apply topically 2 times daily 60 g 3    clonazePAM (KLONOPIN) 1 MG tablet Take 1 tablet by mouth 2 times daily      clotrimazole (LOTRIMIN) 1 % external cream Apply topically 2 times daily 30 g 0    dimenhyDRINATE (DRAMAMINE) 50 MG tablet Take 1 tablet (50 mg) by mouth nightly as needed for sleep 14 tablet 0    Emollient (CERAVE MOISTURIZING) CREA Externally apply 1 Application topically 2 times daily 453 g 11    eszopiclone (LUNESTA) 2 MG tablet Take 2 mg by mouth at bedtime      furosemide (LASIX) 20 MG tablet TAKE 1 TABLET BY MOUTH TWICE DAILY 180 tablet 3    gabapentin (NEURONTIN) 100 MG capsule Take 1 capsule (100 mg) by mouth 3 times daily 90 capsule 4    hydrocortisone 2.5 % cream APPLY TO AFFECTED AREA(S) TWO TIMES A DAY 30 g 1    hydrOXYzine HCl (ATARAX) 50 MG tablet TAKE TWO TABLETS BY MOUTH THREE TIMES A DAY AS NEEDED FOR ANXIETY 70 tablet 6    ibuprofen (ADVIL/MOTRIN) 200 MG tablet Take 3 tablets (600 mg) by mouth every 6 hours as needed for moderate pain (4-6)      lamoTRIgine  (LAMICTAL) 200 MG tablet Take 1 tablet by mouth 2 times daily      levothyroxine (SYNTHROID/LEVOTHROID) 50 MCG tablet TAKE ONE TABLET BY MOUTH EVERY DAY 90 tablet 2    lisinopril (ZESTRIL) 10 MG tablet Take 1 tablet (10 mg) by mouth daily 90 tablet 3    meloxicam (MOBIC) 15 MG tablet Take 15 mg by mouth daily with food      metFORMIN (GLUCOPHAGE) 500 MG tablet TAKE ONE TABLET BY MOUTH EVERY DAY WITH BREAKFAST 90 tablet 1    metroNIDAZOLE (METROGEL) 0.75 % external gel Apply topically 2 times daily 45 g 2    montelukast (SINGULAIR) 10 MG tablet TAKE ONE TABLET BY MOUTH AT BEDTIME 90 tablet 3    multivitamin, therapeutic (THERA-VIT) TABS tablet Take 1 tablet by mouth daily      nebivolol (BYSTOLIC) 5 MG tablet Take 1 tablet (5 mg) by mouth daily 30 tablet 11    nitroGLYcerin (NITROSTAT) 0.4 MG sublingual tablet PLACE ONE TABLET UNDER THE TONGUE AT THE 1ST SIGN OF ATTACK. IF PAIN IS UNRELIEVED OR WORSENED 5 MINUTES AFTER 1ST DOSE, PROMPT MEDICAL ASSISTANCE IS NEEDED. MAY REPEAT EVERY 5 MINUTES UNTIL PAIN IS RELIEVED. MAX OF THREE DOSES 25 tablet 4    nystatin (MYCOSTATIN) 822551 UNIT/GM external powder APPLY TOPICALLY TWICE A DAY AS NEEDED SKIN RASH 45 g 9    omeprazole (PRILOSEC) 40 MG DR capsule TAKE ONE CAPSULE BY MOUTH TWICE A DAY FOR 1 MONTH THEN TAKE ONE CAPSULE EVERY DAY 60 capsule 2    ondansetron (ZOFRAN ODT) 4 MG ODT tab Take 1 tablet (4 mg) by mouth every 8 hours as needed for nausea 4 tablet 0    rosuvastatin (CRESTOR) 40 MG tablet Take 1 tablet (40 mg) by mouth daily 90 tablet 1    Semaglutide (RYBELSUS) 14 MG tablet TAKE ONE TABLET BY MOUTH EVERY DAY 90 tablet 1    sucralfate (CARAFATE) 1 GM tablet TAKE ONE TABLET BY MOUTH FOUR TIMES A DAY AS NEEDED FOR NAUSEA 120 tablet 0    terbinafine (LAMISIL) 1 % external cream APPLY TO AFFECTED AREA(S) TWO TIMES A DAY 30 g 2    triamcinolone (KENALOG) 0.025 % external ointment Apply topically 2 times daily 80 g 1    Vitamin D3 (CHOLECALCIFEROL) 125 MCG (5000 UT)  "tablet Take 1 tablet by mouth three times a week      VRAYLAR 6 MG capsule Take 6 mg by mouth daily         Allergies   Allergen Reactions    Codeine Nausea and Vomiting    Cyclobenzaprine Nausea and Vomiting    Gabapentin Rash    Hydrocodone Nausea and Vomiting    Sulfa Antibiotics Nausea and Vomiting        Social History     Tobacco Use    Smoking status: Every Day     Current packs/day: 0.30     Average packs/day: 0.3 packs/day for 30.0 years (9.0 ttl pk-yrs)     Types: Cigarettes     Passive exposure: Past    Smokeless tobacco: Never    Tobacco comments:     2-3 cigs per day   Substance Use Topics    Alcohol use: Not Currently       History   Drug Use No           Review of Systems   Constitutional:  Negative for chills and fever.   HENT:  Negative for congestion.    Respiratory:  Negative for cough and shortness of breath.    Cardiovascular:  Negative for chest pain and leg swelling.   Gastrointestinal:  Negative for abdominal pain, diarrhea, nausea and vomiting.   Musculoskeletal:  Positive for back pain.     Objective    /85 (BP Location: Left arm, Patient Position: Sitting, Cuff Size: Adult Regular)   Pulse 72   Temp 98.1  F (36.7  C) (Oral)   Resp 20   Ht 1.6 m (5' 3\")   Wt 100.3 kg (221 lb 1.6 oz)   LMP 01/01/2014 (Approximate)   SpO2 93%   BMI 39.17 kg/m     Estimated body mass index is 39.17 kg/m  as calculated from the following:    Height as of this encounter: 1.6 m (5' 3\").    Weight as of this encounter: 100.3 kg (221 lb 1.6 oz).  Physical Exam  Vitals and nursing note reviewed.   Constitutional:       General: She is not in acute distress.     Appearance: Normal appearance. She is obese. She is not ill-appearing.   HENT:      Head: Normocephalic and atraumatic.      Right Ear: Tympanic membrane, ear canal and external ear normal.      Left Ear: Tympanic membrane, ear canal and external ear normal.      Nose: Nose normal.      Mouth/Throat:      Mouth: Mucous membranes are moist.      " Pharynx: Oropharynx is clear.   Eyes:      Extraocular Movements: Extraocular movements intact.      Conjunctiva/sclera: Conjunctivae normal.      Pupils: Pupils are equal, round, and reactive to light.   Cardiovascular:      Rate and Rhythm: Normal rate and regular rhythm.      Pulses: Normal pulses.      Heart sounds: Normal heart sounds.   Pulmonary:      Effort: Pulmonary effort is normal.      Breath sounds: Normal breath sounds.   Abdominal:      General: Bowel sounds are normal. There is no distension.      Palpations: Abdomen is soft. There is no mass.      Tenderness: There is no abdominal tenderness. There is no guarding.   Musculoskeletal:      Cervical back: Normal range of motion.   Skin:     General: Skin is warm and dry.   Neurological:      General: No focal deficit present.      Mental Status: She is alert and oriented to person, place, and time. Mental status is at baseline.   Psychiatric:         Mood and Affect: Mood normal.           Recent Labs   Lab Test 07/10/24  1012 04/09/24  1039   HGB  --  14.6   PLT  --  292    141   POTASSIUM 3.8 4.1   CR 0.78 0.80   A1C 5.7* 5.8*      Diagnostics  No labs were ordered during this visit.   EKG: appears normal, NSR, normal axis, normal intervals, no acute ST/T changes c/w ischemia    Revised Cardiac Risk Index (RCRI)  The patient has the following serious cardiovascular risks for perioperative complications:   - No serious cardiac risks = 0 points     RCRI Interpretation: 0 points: Class I (very low risk - 0.4% complication rate)         Signed Electronically by: Elizabeth Gong CNP  A copy of this evaluation report is provided to the requesting physician.

## 2024-08-19 NOTE — TELEPHONE ENCOUNTER
MTM Recruitment: [employer]     Referral outreach attempt #3 on August 19, 2024      Outcome: left voicemail- Call back number [phone]    Marga Mancuso PharmD   Medication Therapy Management   Pharmacy Resident  Pager: [phone]

## 2024-08-20 ENCOUNTER — VIRTUAL VISIT (OUTPATIENT)
Dept: INTERNAL MEDICINE | Facility: CLINIC | Age: 54
End: 2024-08-20
Payer: COMMERCIAL

## 2024-08-20 DIAGNOSIS — T50.905A DRUG-INDUCED EXTRAPYRAMIDAL MOVEMENT DISORDER: ICD-10-CM

## 2024-08-20 DIAGNOSIS — G25.89 DRUG-INDUCED EXTRAPYRAMIDAL MOVEMENT DISORDER: ICD-10-CM

## 2024-08-20 DIAGNOSIS — R47.89 OTHER SPEECH DISTURBANCE: Primary | ICD-10-CM

## 2024-08-20 DIAGNOSIS — E11.9 TYPE 2 DIABETES MELLITUS WITHOUT COMPLICATION, WITHOUT LONG-TERM CURRENT USE OF INSULIN (H): ICD-10-CM

## 2024-08-20 PROCEDURE — 99214 OFFICE O/P EST MOD 30 MIN: CPT | Mod: 95 | Performed by: INTERNAL MEDICINE

## 2024-08-20 NOTE — PROGRESS NOTES
Swetha is a 54 year old who is being evaluated via a billable video visit.    How would you like to obtain your AVS? MyChart  If the video visit is dropped, the invitation should be resent by: Text to cell phone: [phone]   Will anyone else be joining your video visit? No        This is a VIDEO ( using Doximity)  encounter with the patient.       Location of the provider : office   Location of the patient : home      17:34 --- 18:02          Dr Low's note      Patient's instructions / PLAN:                                                        Plan:  Stop Gabapentin, stop Vraylar  2. Discuss with psychiatry about the other meds: Cariprazine, clonazepam, dramamine, lunesta,  lamictal      ASSESSMENT & PLAN:                                                      (R47.89) Other speech disturbance  (primary encounter diagnosis)  Comment: suspect meds, and extrapyramidal symptoms   Plan: Vitamin B1 whole blood, Folate, Vitamin B12,         Vitamin B6, Lipid panel reflex to direct LDL         Fasting, Comprehensive metabolic panel            (E11.9) Type 2 diabetes mellitus without complication, without long-term current use of insulin (H)  Comment: A1c is stable   Plan: Vitamin B1 whole blood, Folate, Vitamin B12,         Vitamin B6, Lipid panel reflex to direct LDL         Fasting, Comprehensive metabolic panel            (G25.89,  T50.905A) Drug-induced extrapyramidal movement disorder  Comment:   Plan:        Chief complaint:                                                      Follow up chronic medical problems      SUBJECTIVE:                                                    History of present illness:        Subjective   Swetha is a 54 year old, presenting for the following health issues:  Follow Up    Skin is better  -- with Clotrimazole     Meds side effects  -- she was given a medication and affected the speech     Cariprazine, clonazepam, dramamine, lunesta, gabapentin, lamictal    Weight gain --  "on highest dose Rybelsus.  No w the hx of HH    Uptodate:\"Cariprazine may cause significant weight gain (increase of ?7% from baseline), which is a component of the metabolic syndrome observed with this pharmacologic class. \"     Extrapyramidal symtomps :  tongue movements, drooling, lips moving uncontrolled, hands shacking      LBP w R radiculopathy -- scheduled for surgery on Aug 22.         8/20/2024    10:52 AM   Additional Questions   Roomed by azamit   Accompanied by self         8/20/2024    10:52 AM   Patient Reported Additional Medications   Patient reports taking the following new medications none       History of Present Illness       Reason for visit:  Yeast infection under breast and spreading    She eats 0-1 servings of fruits and vegetables daily.She consumes 0 sweetened beverage(s) daily.She exercises with enough effort to increase her heart rate 9 or less minutes per day.  She exercises with enough effort to increase her heart rate 3 or less days per week.   She is taking medications regularly.      Review of Systems:                                                      ROS: negative for fever, chills, cough, wheezes, chest pain, shortness of breath, vomiting, abdominal pain, leg swelling       OBJECTIVE:           An actual physical exam can't be done during phone visit   A limited exam can sometimes be performed by video visit   NAD      PMHx: reviewed  Past Medical History:   Diagnosis Date    Benign essential hypertension     Bipolar disorder (H)     Chronic abdominal pain     Chronic constipation     COPD (chronic obstructive pulmonary disease) (H)     Hypothyroidism     Impaired fasting glucose     Kidney stone     Obesity (BMI 30-39.9)     PONV (postoperative nausea and vomiting)     Pre-diabetes     Pure hypercholesterolemia       PSHx: reviewed  Past Surgical History:   Procedure Laterality Date    ANESTHESIA OUT OF OR MRI N/A 10/07/2021    Procedure: ANESTHESIA OUT OF OR MRI;  Surgeon: " GENERIC ANESTHESIA PROVIDER;  Location: [facility]    ANESTHESIA OUT OF OR MRI N/A 5/11/2023    Procedure: MRI OF NECK WITH AND WITHOUT CONTRAST;  Surgeon: GENERIC ANESTHESIA PROVIDER;  [facility]: [facility]    ANESTHESIA OUT OF OR MRI Left 6/26/2023    Procedure: Anesthesia out of OR MRI;  Surgeon: GENERIC ANESTHESIA PROVIDER;  Location: [facility]    APPENDECTOMY      ARTHROSCOPY SHOULDER DECOMPRESSION Left 10/21/2015    Procedure: ARTHROSCOPY SHOULDER DECOMPRESSION;  Surgeon: Julien Milian MD;  Location: [facility]    BIOPSY ARTERY TEMPORAL Left 03/11/2020    Procedure: LEFT TEMPORAL ARTERY BIOPSY;  Surgeon: Ibeth Conner MD;  [facility]: [facility]    BRONCHIAL THERMOPLASTY N/A 11/14/2014    Procedure: BRONCHIAL THERMOPLASTY;  Surgeon: Ward Whitaker MD;  [facility]    BRONCHIAL THERMOPLASTY N/A 12/19/2014    Procedure: BRONCHIAL THERMOPLASTY;  Surgeon: Ward Whitaker MD;  [facility]    BRONCHIAL THERMOPLASTY N/A 02/06/2015    Procedure: BRONCHIAL THERMOPLASTY;  Surgeon: Ward Whitaker MD;  [facility]    COLONOSCOPY N/A 11/26/2018    Procedure: COLONOSCOPY ([facility]);  Surgeon: Marshall Oakes MD;  Location:  OR    COLONOSCOPY N/A 10/22/2020    Procedure: Colonoscopy;  Surgeon: Marshall Mccormick MD;  Location:  OR    COLONOSCOPY N/A 01/20/2023    Procedure: COLONOSCOPY, FLEXIBLE, WITH LESION REMOVAL USING SNARE;  Surgeon: Carson Domínguez MD;  Location:  GI    COMBINED CYSTOSCOPY, RETROGRADES, URETEROSCOPY, LASER HOLMIUM LITHOTRIPSY URETER(S), INSERT STENT Right 7/19/2023    Procedure: Cystoscopy, Right Ureteroscopy, Right Retrograde Pyelogram;  Surgeon: Sammy Herndon MD;  Location: [facility] OR    DISCECTOMY, FUSION CERVICAL ANTERIOR ONE LEVEL, COMBINED N/A 05/01/2018    Procedure: COMBINED DISCECTOMY, FUSION CERVICAL ANTERIOR ONE LEVEL;  1.  C5-C6 anterior cervical diskectomy and fusion.    2.  C5-C6 application of intervertebral biomechanical device for interbody  fusion purposes.    3.  C5-C6 anterior instrumentation using the standard Kristin InViZia 24 mm plate with four associated bone screws, 12 mm in length.  Inferior screws are fixed.  Superior screws are va    ESOPHAGOSCOPY, GASTROSCOPY, DUODENOSCOPY (EGD), COMBINED N/A 10/22/2020    Procedure: Esophagoscopy, gastroscopy, duodenoscopy with biopsies;  Surgeon: Marshall Mccormick MD;  [facility]: [facility]    ESOPHAGOSCOPY, GASTROSCOPY, DUODENOSCOPY (EGD), COMBINED N/A 06/17/2021    Procedure: ESOPHAGOGASTRODUODENOSCOPY, WITH BIOPSY;  Surgeon: Darrel Damon MD;  Location: [facility]    EXCISE MASS NECK Left 02/01/2023    Procedure: exploration of  NECK left;  Surgeon: Ibeth Conner MD;  [facility]: [facility]    EXCISE MASS TRUNK Left 11/03/2021    Procedure: EXCISION LEFT SHOULDER LIPOMA;  Surgeon: Ibeth Conner MD;  [facility]: [facility]    EXCISE NODE MEDIASTINAL  04/26/2013    Procedure: EXCISE NODE MEDIASTINAL;;  Surgeon: Av Peña MD;  Location:  OR    LAPAROSCOPIC CHOLECYSTECTOMY N/A 10/19/2017    Procedure: LAPAROSCOPIC CHOLECYSTECTOMY;  LAPAROSCOPIC CHOLECYSTECTOMY;  Surgeon: Ney Jerry MD;  [facility]: [facility]    LARYNGOSCOPY, EXCISE VOCAL CORD LESION, COMBINED      THORACOSCOPY  04/26/2013    Procedure: THORACOSCOPY;  LEFT VIDEO ASSISTED THORACOSCOPY, RESECTION OF POSTERIOR MEDIASTINAL MASS;  Surgeon: Av Peña MD;  Location: [facility] OR    TONSILLECTOMY & ADENOIDECTOMY          Meds: reviewed  Current Outpatient Medications   Medication Sig Dispense Refill    ACCU-CHEK GUIDE test strip USE TO TEST BLOOD SUGAR ONCE A DAY OR AS DIRECTED 100 strip 1    acetaminophen (TYLENOL) 500 MG tablet Take 2 tablets (1,000 mg) by mouth every 6 hours as needed for pain      albuterol (PROAIR HFA/PROVENTIL HFA/VENTOLIN HFA) 108 (90 Base) MCG/ACT inhaler INHALE TWO PUFFS BY MOUTH EVERY 6 HOURS (Patient taking differently: 2 puffs every 6 hours as needed INHALE TWO PUFFS BY MOUTH EVERY 6 HOURS) 6.7 g 0     albuterol (PROVENTIL) (2.5 MG/3ML) 0.083% neb solution INHALE ONE VIAL BY NEBULIZER EVERY 6 HOURS AS NEEDED FOR SHORTNESS OF BREATH OR WHEEZING Strength: (2.5 MG/3ML) 0.083% 75 mL 1    benzoyl peroxide (ACNE-CLEAR) 10 % external gel Apply topically 2 times daily as needed 90 g 1    blood glucose (NO BRAND SPECIFIED) test strip Use to test blood sugar 1 times daily or as directed.  Dispense Accu-chek Olinda Plus or per patient insurance 100 strip 3    blood glucose monitoring (SOFTCLIX) lancets USE TO TEST BLOOD SUGARS ONCE DAILY OR AS DIRECTED 100 each 1    budesonide-formoterol (SYMBICORT) 160-4.5 MCG/ACT Inhaler Inhale 2 puffs into the lungs 2 times daily 30.6 g 0    buPROPion (WELLBUTRIN XL) 150 MG 24 hr tablet TAKE ONE TABLET BY MOUTH EVERY MORNING 90 tablet 2    cetirizine (ZYRTEC) 10 MG tablet TAKE ONE TABLET BY MOUTH EVERY DAY AS NEEDED 90 tablet 1    clindamycin (CLINDAMAX) 1 % external gel Apply topically 2 times daily 60 g 3    clonazePAM (KLONOPIN) 1 MG tablet Take 1 tablet by mouth 2 times daily      clotrimazole (LOTRIMIN) 1 % external cream Apply topically 2 times daily 30 g 0    dimenhyDRINATE (DRAMAMINE) 50 MG tablet Take 1 tablet (50 mg) by mouth nightly as needed for sleep 14 tablet 0    Emollient (CERAVE MOISTURIZING) CREA Externally apply 1 Application topically 2 times daily 453 g 11    eszopiclone (LUNESTA) 2 MG tablet Take 2 mg by mouth at bedtime      furosemide (LASIX) 20 MG tablet TAKE 1 TABLET BY MOUTH TWICE DAILY 180 tablet 3    gabapentin (NEURONTIN) 100 MG capsule Take 1 capsule (100 mg) by mouth 3 times daily 90 capsule 4    hydrocortisone 2.5 % cream APPLY TO AFFECTED AREA(S) TWO TIMES A DAY 30 g 1    hydrOXYzine HCl (ATARAX) 50 MG tablet TAKE TWO TABLETS BY MOUTH THREE TIMES A DAY AS NEEDED FOR ANXIETY 70 tablet 6    ibuprofen (ADVIL/MOTRIN) 200 MG tablet Take 3 tablets (600 mg) by mouth every 6 hours as needed for moderate pain (4-6)      lamoTRIgine (LAMICTAL) 200 MG tablet Take 1  tablet by mouth 2 times daily      levothyroxine (SYNTHROID/LEVOTHROID) 50 MCG tablet TAKE ONE TABLET BY MOUTH EVERY DAY 90 tablet 2    lisinopril (ZESTRIL) 10 MG tablet Take 1 tablet (10 mg) by mouth daily 90 tablet 3    meloxicam (MOBIC) 15 MG tablet Take 15 mg by mouth daily with food      metFORMIN (GLUCOPHAGE) 500 MG tablet TAKE ONE TABLET BY MOUTH EVERY DAY WITH BREAKFAST 90 tablet 1    metroNIDAZOLE (METROGEL) 0.75 % external gel Apply topically 2 times daily 45 g 2    montelukast (SINGULAIR) 10 MG tablet TAKE ONE TABLET BY MOUTH AT BEDTIME 90 tablet 3    multivitamin, therapeutic (THERA-VIT) TABS tablet Take 1 tablet by mouth daily      nebivolol (BYSTOLIC) 5 MG tablet Take 1 tablet (5 mg) by mouth daily 30 tablet 11    nitroGLYcerin (NITROSTAT) 0.4 MG sublingual tablet PLACE ONE TABLET UNDER THE TONGUE AT THE 1ST SIGN OF ATTACK. IF PAIN IS UNRELIEVED OR WORSENED 5 MINUTES AFTER 1ST DOSE, PROMPT MEDICAL ASSISTANCE IS NEEDED. MAY REPEAT EVERY 5 MINUTES UNTIL PAIN IS RELIEVED. MAX OF THREE DOSES 25 tablet 4    nystatin (MYCOSTATIN) 284760 UNIT/GM external powder APPLY TOPICALLY TWICE A DAY AS NEEDED SKIN RASH 45 g 9    omeprazole (PRILOSEC) 40 MG DR capsule TAKE ONE CAPSULE BY MOUTH TWICE A DAY FOR 1 MONTH THEN TAKE ONE CAPSULE EVERY DAY 60 capsule 2    ondansetron (ZOFRAN ODT) 4 MG ODT tab Take 1 tablet (4 mg) by mouth every 8 hours as needed for nausea 4 tablet 0    rosuvastatin (CRESTOR) 40 MG tablet Take 1 tablet (40 mg) by mouth daily 90 tablet 1    Semaglutide (RYBELSUS) 14 MG tablet TAKE ONE TABLET BY MOUTH EVERY DAY 90 tablet 1    sucralfate (CARAFATE) 1 GM tablet TAKE ONE TABLET BY MOUTH FOUR TIMES A DAY AS NEEDED FOR NAUSEA 120 tablet 0    terbinafine (LAMISIL) 1 % external cream APPLY TO AFFECTED AREA(S) TWO TIMES A DAY 30 g 2    triamcinolone (KENALOG) 0.025 % external ointment Apply topically 2 times daily 80 g 1    Vitamin D3 (CHOLECALCIFEROL) 125 MCG (5000 UT) tablet Take 1 tablet by mouth three  times a week      VRAYLAR 6 MG capsule Take 6 mg by mouth daily         Soc Hx: reviewed  Fam Hx: reviewed        Chart documentation was completed, in part, with Se[employer] voice-recognition software. Even though reviewed, some grammatical, spelling, and word errors may remain.    Roro Low MD  Internal Medicine       Signed Electronically by: Roro Chawla MD

## 2024-08-20 NOTE — PATIENT INSTRUCTIONS
Plan:  Stop Gabapentin, stop Vraylar  2. Discuss with psychiatry about the other meds: Cariprazine, clonazepam, dramamine, lunesta,  lamictal

## 2024-08-21 ENCOUNTER — ANESTHESIA EVENT (OUTPATIENT)
Dept: SURGERY | Facility: CLINIC | Age: 54
End: 2024-08-21
Payer: COMMERCIAL

## 2024-08-22 ENCOUNTER — ANESTHESIA (OUTPATIENT)
Dept: SURGERY | Facility: CLINIC | Age: 54
End: 2024-08-22
Payer: COMMERCIAL

## 2024-08-22 ENCOUNTER — HOSPITAL ENCOUNTER (OUTPATIENT)
Facility: CLINIC | Age: 54
Discharge: HOME | End: 2024-08-22
Attending: ANESTHESIOLOGY | Admitting: RADIOLOGY
Payer: COMMERCIAL

## 2024-08-22 ENCOUNTER — HOSPITAL ENCOUNTER (OUTPATIENT)
Dept: MRI IMAGING | Facility: CLINIC | Age: 54
Discharge: HOME | End: 2024-08-22
Attending: STUDENT IN AN ORGANIZED HEALTH CARE EDUCATION/TRAINING PROGRAM | Admitting: ANESTHESIOLOGY
Payer: COMMERCIAL

## 2024-08-22 VITALS
WEIGHT: 224.1 LBS | SYSTOLIC BLOOD PRESSURE: 112 MMHG | DIASTOLIC BLOOD PRESSURE: 75 MMHG | HEART RATE: 79 BPM | RESPIRATION RATE: 16 BRPM | TEMPERATURE: 96.9 F | BODY MASS INDEX: 39.71 KG/M2 | OXYGEN SATURATION: 94 % | HEIGHT: 63 IN

## 2024-08-22 DIAGNOSIS — M79.606 LEG PAIN: ICD-10-CM

## 2024-08-22 LAB
GLUCOSE BLDC GLUCOMTR-MCNC: 107 MG/DL (ref 70–99)
GLUCOSE BLDC GLUCOMTR-MCNC: 111 MG/DL (ref 70–99)

## 2024-08-22 PROCEDURE — 250N000009 HC RX 250

## 2024-08-22 PROCEDURE — 72148 MRI LUMBAR SPINE W/O DYE: CPT

## 2024-08-22 PROCEDURE — 710N000009 HC RECOVERY PHASE 1, LEVEL 1, PER MIN

## 2024-08-22 PROCEDURE — 250N000009 HC RX 250: Performed by: ANESTHESIOLOGY

## 2024-08-22 PROCEDURE — 710N000012 HC RECOVERY PHASE 2, PER MINUTE

## 2024-08-22 PROCEDURE — 370N000017 HC ANESTHESIA TECHNICAL FEE, PER MIN

## 2024-08-22 PROCEDURE — 250N000011 HC RX IP 250 OP 636: Performed by: ANESTHESIOLOGY

## 2024-08-22 PROCEDURE — 250N000011 HC RX IP 250 OP 636

## 2024-08-22 PROCEDURE — 258N000003 HC RX IP 258 OP 636: Performed by: ANESTHESIOLOGY

## 2024-08-22 PROCEDURE — 999N000141 HC STATISTIC PRE-PROCEDURE NURSING ASSESSMENT

## 2024-08-22 PROCEDURE — 82962 GLUCOSE BLOOD TEST: CPT

## 2024-08-22 RX ORDER — FENTANYL CITRATE 50 UG/ML
25 INJECTION, SOLUTION INTRAMUSCULAR; INTRAVENOUS
Status: DISCONTINUED | OUTPATIENT
Start: 2024-08-22 | End: 2024-08-22 | Stop reason: HOSPADM

## 2024-08-22 RX ORDER — ONDANSETRON 2 MG/ML
4 INJECTION INTRAMUSCULAR; INTRAVENOUS EVERY 30 MIN PRN
Status: DISCONTINUED | OUTPATIENT
Start: 2024-08-22 | End: 2024-08-22 | Stop reason: HOSPADM

## 2024-08-22 RX ORDER — FENTANYL CITRATE 50 UG/ML
INJECTION, SOLUTION INTRAMUSCULAR; INTRAVENOUS PRN
Status: DISCONTINUED | OUTPATIENT
Start: 2024-08-22 | End: 2024-08-22

## 2024-08-22 RX ORDER — PROPOFOL 10 MG/ML
INJECTION, EMULSION INTRAVENOUS PRN
Status: DISCONTINUED | OUTPATIENT
Start: 2024-08-22 | End: 2024-08-22

## 2024-08-22 RX ORDER — ONDANSETRON 4 MG/1
4 TABLET, ORALLY DISINTEGRATING ORAL EVERY 30 MIN PRN
Status: DISCONTINUED | OUTPATIENT
Start: 2024-08-22 | End: 2024-08-22 | Stop reason: HOSPADM

## 2024-08-22 RX ORDER — SODIUM CHLORIDE, SODIUM LACTATE, POTASSIUM CHLORIDE, CALCIUM CHLORIDE 600; 310; 30; 20 MG/100ML; MG/100ML; MG/100ML; MG/100ML
INJECTION, SOLUTION INTRAVENOUS CONTINUOUS
Status: DISCONTINUED | OUTPATIENT
Start: 2024-08-22 | End: 2024-08-22 | Stop reason: HOSPADM

## 2024-08-22 RX ORDER — DEXAMETHASONE SODIUM PHOSPHATE 4 MG/ML
4 INJECTION, SOLUTION INTRA-ARTICULAR; INTRALESIONAL; INTRAMUSCULAR; INTRAVENOUS; SOFT TISSUE
Status: DISCONTINUED | OUTPATIENT
Start: 2024-08-22 | End: 2024-08-22 | Stop reason: HOSPADM

## 2024-08-22 RX ORDER — NALOXONE HYDROCHLORIDE 0.4 MG/ML
0.1 INJECTION, SOLUTION INTRAMUSCULAR; INTRAVENOUS; SUBCUTANEOUS
Status: DISCONTINUED | OUTPATIENT
Start: 2024-08-22 | End: 2024-08-22 | Stop reason: HOSPADM

## 2024-08-22 RX ORDER — DEXAMETHASONE SODIUM PHOSPHATE 4 MG/ML
INJECTION, SOLUTION INTRA-ARTICULAR; INTRALESIONAL; INTRAMUSCULAR; INTRAVENOUS; SOFT TISSUE PRN
Status: DISCONTINUED | OUTPATIENT
Start: 2024-08-22 | End: 2024-08-22

## 2024-08-22 RX ORDER — LIDOCAINE 40 MG/G
CREAM TOPICAL
Status: DISCONTINUED | OUTPATIENT
Start: 2024-08-22 | End: 2024-08-22 | Stop reason: HOSPADM

## 2024-08-22 RX ORDER — GLYCOPYRROLATE 0.2 MG/ML
INJECTION, SOLUTION INTRAMUSCULAR; INTRAVENOUS PRN
Status: DISCONTINUED | OUTPATIENT
Start: 2024-08-22 | End: 2024-08-22

## 2024-08-22 RX ORDER — LIDOCAINE HYDROCHLORIDE 20 MG/ML
INJECTION, SOLUTION INFILTRATION; PERINEURAL PRN
Status: DISCONTINUED | OUTPATIENT
Start: 2024-08-22 | End: 2024-08-22

## 2024-08-22 RX ORDER — OXYCODONE HYDROCHLORIDE 5 MG/1
5 TABLET ORAL EVERY 4 HOURS PRN
Status: DISCONTINUED | OUTPATIENT
Start: 2024-08-22 | End: 2024-08-22 | Stop reason: HOSPADM

## 2024-08-22 RX ORDER — DIAZEPAM 10 MG/2ML
2.5 INJECTION, SOLUTION INTRAMUSCULAR; INTRAVENOUS
Status: DISCONTINUED | OUTPATIENT
Start: 2024-08-22 | End: 2024-08-22 | Stop reason: HOSPADM

## 2024-08-22 RX ORDER — ONDANSETRON 2 MG/ML
INJECTION INTRAMUSCULAR; INTRAVENOUS PRN
Status: DISCONTINUED | OUTPATIENT
Start: 2024-08-22 | End: 2024-08-22

## 2024-08-22 RX ORDER — FENTANYL CITRATE 50 UG/ML
50 INJECTION, SOLUTION INTRAMUSCULAR; INTRAVENOUS EVERY 5 MIN PRN
Status: DISCONTINUED | OUTPATIENT
Start: 2024-08-22 | End: 2024-08-22 | Stop reason: HOSPADM

## 2024-08-22 RX ORDER — OXYCODONE HYDROCHLORIDE 5 MG/1
10 TABLET ORAL EVERY 4 HOURS PRN
Status: DISCONTINUED | OUTPATIENT
Start: 2024-08-22 | End: 2024-08-22 | Stop reason: HOSPADM

## 2024-08-22 RX ORDER — SCOLOPAMINE TRANSDERMAL SYSTEM 1 MG/1
1 PATCH, EXTENDED RELEASE TRANSDERMAL ONCE
Status: DISCONTINUED | OUTPATIENT
Start: 2024-08-22 | End: 2024-08-22 | Stop reason: HOSPADM

## 2024-08-22 RX ORDER — LABETALOL HYDROCHLORIDE 5 MG/ML
10 INJECTION, SOLUTION INTRAVENOUS EVERY 10 MIN PRN
Status: DISCONTINUED | OUTPATIENT
Start: 2024-08-22 | End: 2024-08-22 | Stop reason: HOSPADM

## 2024-08-22 RX ORDER — MEPERIDINE HYDROCHLORIDE 25 MG/ML
12.5 INJECTION INTRAMUSCULAR; INTRAVENOUS; SUBCUTANEOUS EVERY 5 MIN PRN
Status: DISCONTINUED | OUTPATIENT
Start: 2024-08-22 | End: 2024-08-22 | Stop reason: HOSPADM

## 2024-08-22 RX ORDER — ALBUTEROL SULFATE 0.83 MG/ML
2.5 SOLUTION RESPIRATORY (INHALATION) EVERY 4 HOURS PRN
Status: DISCONTINUED | OUTPATIENT
Start: 2024-08-22 | End: 2024-08-22 | Stop reason: HOSPADM

## 2024-08-22 RX ADMIN — FENTANYL CITRATE 50 MCG: 0.05 INJECTION, SOLUTION INTRAMUSCULAR; INTRAVENOUS at 08:42

## 2024-08-22 RX ADMIN — DEXAMETHASONE SODIUM PHOSPHATE 4 MG: 4 INJECTION, SOLUTION INTRA-ARTICULAR; INTRALESIONAL; INTRAMUSCULAR; INTRAVENOUS; SOFT TISSUE at 07:40

## 2024-08-22 RX ADMIN — FENTANYL CITRATE 50 MCG: 50 INJECTION INTRAMUSCULAR; INTRAVENOUS at 08:00

## 2024-08-22 RX ADMIN — GLYCOPYRROLATE 0.2 MG: 0.2 INJECTION, SOLUTION INTRAMUSCULAR; INTRAVENOUS at 07:40

## 2024-08-22 RX ADMIN — PROPOFOL 200 MG: 10 INJECTION, EMULSION INTRAVENOUS at 07:40

## 2024-08-22 RX ADMIN — MIDAZOLAM 2 MG: 1 INJECTION INTRAMUSCULAR; INTRAVENOUS at 07:35

## 2024-08-22 RX ADMIN — SODIUM CHLORIDE, POTASSIUM CHLORIDE, SODIUM LACTATE AND CALCIUM CHLORIDE: 600; 310; 30; 20 INJECTION, SOLUTION INTRAVENOUS at 06:52

## 2024-08-22 RX ADMIN — LIDOCAINE HYDROCHLORIDE 50 MG: 20 INJECTION, SOLUTION INFILTRATION; PERINEURAL at 07:40

## 2024-08-22 RX ADMIN — SCOPALAMINE 1 PATCH: 1 PATCH, EXTENDED RELEASE TRANSDERMAL at 07:13

## 2024-08-22 RX ADMIN — ONDANSETRON 4 MG: 2 INJECTION INTRAMUSCULAR; INTRAVENOUS at 07:40

## 2024-08-22 ASSESSMENT — ACTIVITIES OF DAILY LIVING (ADL)
ADLS_ACUITY_SCORE: 34

## 2024-08-22 ASSESSMENT — COPD QUESTIONNAIRES
CAT_SEVERITY: MILD
COPD: 1

## 2024-08-22 NOTE — ANESTHESIA PREPROCEDURE EVALUATION
Anesthesia Pre-Procedure Evaluation    Patient: Swetha Patterson   MRN: [id number] : 1970        Procedure : Procedure(s):  Anesthesia out of OR MRI lumbar spine          Past Medical History:   Diagnosis Date    Benign essential hypertension     Bipolar disorder (H)     Chronic abdominal pain     Chronic constipation     COPD (chronic obstructive pulmonary disease) (H)     Hypothyroidism     Impaired fasting glucose     Kidney stone     Obesity (BMI 30-39.9)     PONV (postoperative nausea and vomiting)     Pre-diabetes     Pure hypercholesterolemia       Past Surgical History:   Procedure Laterality Date    ANESTHESIA OUT OF OR MRI N/A 10/07/2021    Procedure: ANESTHESIA OUT OF OR MRI;  Surgeon: GENERIC ANESTHESIA PROVIDER;  [facility]: [facility]    ANESTHESIA OUT OF OR MRI N/A 2023    Procedure: MRI OF NECK WITH AND WITHOUT CONTRAST;  Surgeon: GENERIC ANESTHESIA PROVIDER;  [facility]    ANESTHESIA OUT OF OR MRI Left 2023    Procedure: Anesthesia out of OR MRI;  Surgeon: GENERIC ANESTHESIA PROVIDER;  [facility]    APPENDECTOMY      ARTHROSCOPY SHOULDER DECOMPRESSION Left 10/21/2015    Procedure: ARTHROSCOPY SHOULDER DECOMPRESSION;  Surgeon: Julien Milian MD;  [facility]: [facility]    BIOPSY ARTERY TEMPORAL Left 2020    Procedure: LEFT TEMPORAL ARTERY BIOPSY;  Surgeon: Ibeth Conner MD;  [facility]: [facility]    BRONCHIAL THERMOPLASTY N/A 2014    Procedure: BRONCHIAL THERMOPLASTY;  Surgeon: Ward Whitaker MD;  [facility]    BRONCHIAL THERMOPLASTY N/A 2014    Procedure: BRONCHIAL THERMOPLASTY;  Surgeon: Ward Whitaker MD;  [facility]    BRONCHIAL THERMOPLASTY N/A 2015    Procedure: BRONCHIAL THERMOPLASTY;  Surgeon: Ward Wihtaker MD;  [facility]    COLONOSCOPY N/A 2018    Procedure: COLONOSCOPY ([facility]);  Surgeon: Marshall Oakes MD;  [facility]: [facility]    COLONOSCOPY N/A 10/22/2020    Procedure: Colonoscopy;  Surgeon: Jace  Marshall VIZCARRA MD;  Location:  OR    COLONOSCOPY N/A 01/20/2023    Procedure: COLONOSCOPY, FLEXIBLE, WITH LESION REMOVAL USING SNARE;  Surgeon: Carson Domínguez MD;  Location:  GI    COMBINED CYSTOSCOPY, RETROGRADES, URETEROSCOPY, LASER HOLMIUM LITHOTRIPSY URETER(S), INSERT STENT Right 7/19/2023    Procedure: Cystoscopy, Right Ureteroscopy, Right Retrograde Pyelogram;  Surgeon: Sammy Herndon MD;  [facility] [facility] [place]    DISCECTOMY, FUSION CERVICAL ANTERIOR ONE LEVEL, COMBINED N/A 05/01/2018    Procedure: COMBINED DISCECTOMY, FUSION CERVICAL ANTERIOR ONE LEVEL;  1.  C5-C6 anterior cervical diskectomy and fusion.    2.  C5-C6 application of intervertebral biomechanical device for interbody fusion purposes.    3.  C5-C6 anterior instrumentation using the standard Kristin InViZia 24 mm plate with four associated bone screws, 12 mm in length.  Inferior screws are fixed.  Superior screws are va    ESOPHAGOSCOPY, GASTROSCOPY, DUODENOSCOPY (EGD), COMBINED N/A 10/22/2020    Procedure: Esophagoscopy, gastroscopy, duodenoscopy with biopsies;  Surgeon: Marshall Mccormick MD;  Location:  OR    ESOPHAGOSCOPY, GASTROSCOPY, DUODENOSCOPY (EGD), COMBINED N/A 06/17/2021    Procedure: ESOPHAGOGASTRODUODENOSCOPY, WITH BIOPSY;  Surgeon: Darrle Damon MD;  Location:  GI    EXCISE MASS NECK Left 02/01/2023    Procedure: exploration of  NECK left;  Surgeon: Ibeth Conner MD;  Location:  OR    EXCISE MASS TRUNK Left 11/03/2021    Procedure: EXCISION LEFT SHOULDER LIPOMA;  Surgeon: Ibeth Conner MD;  Location:  OR    EXCISE NODE MEDIASTINAL  04/26/2013    Procedure: EXCISE NODE MEDIASTINAL;;  Surgeon: Av Peña MD;  Location:  OR    LAPAROSCOPIC CHOLECYSTECTOMY N/A 10/19/2017    Procedure: LAPAROSCOPIC CHOLECYSTECTOMY;  LAPAROSCOPIC CHOLECYSTECTOMY;  Surgeon: Ney Jerry MD;  Location:  OR    LARYNGOSCOPY, EXCISE VOCAL CORD LESION, COMBINED      THORACOSCOPY  04/26/2013     Procedure: THORACOSCOPY;  LEFT VIDEO ASSISTED THORACOSCOPY, RESECTION OF POSTERIOR MEDIASTINAL MASS;  Surgeon: Av Peña MD;  [facility]    TONSILLECTOMY & ADENOIDECTOMY        Allergies   Allergen Reactions    Codeine Nausea and Vomiting    Cyclobenzaprine Nausea and Vomiting    Gabapentin Rash    Hydrocodone Nausea and Vomiting    Sulfa Antibiotics Nausea and Vomiting      Social History     Tobacco Use    Smoking status: Every Day     Current packs/day: 0.30     Average packs/day: 0.3 packs/day for 30.0 years (9.0 ttl pk-yrs)     Types: Cigarettes     Passive exposure: Past    Smokeless tobacco: Never    Tobacco comments:     2-3 cigs per day   Substance Use Topics    Alcohol use: Not Currently      Wt Readings from Last 1 Encounters:   08/22/24 101.7 kg (224 lb 1.6 oz)        Anesthesia Evaluation   Pt has had prior anesthetic. Type: General.    No history of anesthetic complications       ROS/MED HX  ENT/Pulmonary:  - neg pulmonary ROS   (+)                      asthma   mild,  COPD,              Neurologic:  - neg neurologic ROS     Cardiovascular:     (+) Dyslipidemia hypertension- -   -  - -                                      METS/Exercise Tolerance:     Hematologic:  - neg hematologic  ROS     Musculoskeletal:  - neg musculoskeletal ROS     GI/Hepatic:  - neg GI/hepatic ROS     Renal/Genitourinary:     (+)       Nephrolithiasis ,       Endo:  - neg endo ROS   (+)          thyroid problem, hypothyroidism,    Obesity,       Psychiatric/Substance Use:  - neg psychiatric ROS   (+) psychiatric history anxiety       Infectious Disease:  - neg infectious disease ROS     Malignancy:  - neg malignancy ROS     Other:  - neg other ROS          Physical Exam    Airway        Mallampati: III   TM distance: > 3 FB   Neck ROM: full   Mouth opening: > 3 cm    Respiratory Devices and Support         Dental  no notable dental history         Cardiovascular   cardiovascular exam normal          Pulmonary    pulmonary exam normal                OUTSIDE LABS:  CBC:   Lab Results   Component Value Date    WBC 8.1 04/09/2024    WBC 11.8 (H) 04/12/2023    HGB 14.6 04/09/2024    HGB 11.6 (L) 04/12/2023    HCT 45.0 04/09/2024    HCT 36.0 04/12/2023     04/09/2024     04/12/2023     BMP:   Lab Results   Component Value Date     07/10/2024     04/09/2024    POTASSIUM 3.8 07/10/2024    POTASSIUM 4.1 04/09/2024    CHLORIDE 105 07/10/2024    CHLORIDE 103 04/09/2024    CO2 27 07/10/2024    CO2 26 04/09/2024    BUN 11.0 07/10/2024    BUN 10.5 04/09/2024    CR 0.78 07/10/2024    CR 0.80 04/09/2024     (H) 07/10/2024     (H) 04/09/2024     COAGS:   Lab Results   Component Value Date    INR 0.99 11/20/2020     POC:   Lab Results   Component Value Date    BGM 90 11/21/2020    HCG Negative 12/23/2022    HCGS Negative 11/12/2021     HEPATIC:   Lab Results   Component Value Date    ALBUMIN 4.2 04/09/2024    PROTTOTAL 7.3 04/09/2024    ALT 36 04/09/2024    AST 29 04/09/2024    ALKPHOS 178 (H) 04/09/2024    BILITOTAL 0.2 04/09/2024     OTHER:   Lab Results   Component Value Date    PH 7.38 11/12/2021    LACT 1.8 11/12/2021    A1C 5.7 (H) 07/10/2024    GENE 9.2 07/10/2024    MAG 1.7 10/10/2023    LIPASE 101 05/22/2021    TSH 1.77 04/09/2024    T4 0.96 08/17/2022    CRP 16.0 (H) 02/20/2020    SED 16 02/20/2020       Anesthesia Plan    ASA Status:  3    NPO Status:  NPO Appropriate    Anesthesia Type: General.     - Airway: LMA   Induction: Intravenous, Propofol.   Maintenance: Balanced.        Consents    Anesthesia Plan(s) and associated risks, benefits, and realistic alternatives discussed. Questions answered and patient/representative(s) expressed understanding.     - Discussed:     - Discussed with:  Patient            Postoperative Care    Pain management: IV analgesics, Oral pain medications, Multi-modal analgesia.   PONV prophylaxis: Ondansetron (or other 5HT-3), Dexamethasone or Solumedrol  "    Comments:               Marshall Candelaria MD    I have reviewed the pertinent notes and labs in the chart from the past 30 days and (re)examined the patient.  Any updates or changes from those notes are reflected in this note.              # Obesity: Estimated body mass index is 39.71 kg/m  as calculated from the following:    Height as of this encounter: 1.6 m (5' 2.99\").    Weight as of this encounter: 101.7 kg (224 lb 1.6 oz).      "

## 2024-08-22 NOTE — ANESTHESIA PROCEDURE NOTES
Airway       Patient location during procedure: OR  Staff -        CRNA: Barb Singh APRN CRNA       Performed By: CRNA  Consent for Airway        Urgency: elective  Indications and Patient Condition       Indications for airway management: elizabeth-procedural       Induction type:intravenous       Mask difficulty assessment: 0 - not attempted    Final Airway Details       Final airway type: supraglottic airway    Supraglottic Airway Details        Type: LMA       Brand: I-Gel       LMA size: 4    Post intubation assessment        Placement verified by: capnometry, equal breath sounds and chest rise        Number of attempts at approach: 1       Number of other approaches attempted: 0       Secured with: commercial tube tobias       Ease of procedure: easy       Dentition: Unchanged

## 2024-08-22 NOTE — ANESTHESIA CARE TRANSFER NOTE
Patient: Swetha Patterson    Procedure: Procedure(s):  Anesthesia out of OR MRI lumbar spine       Diagnosis: Pain of lower extremity, unspecified laterality [M79.606]  Lumbar radiculopathy [M54.16]  Diagnosis Additional Information: No value filed.    Anesthesia Type:   General     Note:    Oropharynx: oropharynx clear of all foreign objects  Level of Consciousness: drowsy  Oxygen Supplementation: face mask  Level of Supplemental Oxygen (L/min / FiO2): 6  Independent Airway: airway patency satisfactory and stable  Dentition: dentition unchanged  Vital Signs Stable: post-procedure vital signs reviewed and stable  Report to RN Given: handoff report given  Patient transferred to: PACU    Handoff Report: Identifed the Patient, Identified the Reponsible Provider, Reviewed the pertinent medical history, Discussed the surgical course, Reviewed Intra-OP anesthesia mangement and issues during anesthesia, Set expectations for post-procedure period and Allowed opportunity for questions and acknowledgement of understanding      Vitals:  Vitals Value Taken Time   /95 08/22/24 0835   Temp 97  F (36.1  C) 08/22/24 0825   Pulse 76 08/22/24 0842   Resp 30 08/22/24 0842   SpO2 92 % 08/22/24 0842   Vitals shown include unfiled device data.    Electronically Signed By: LEIGH Nuñez CRNA  August 22, 2024  8:43 AM

## 2024-08-23 NOTE — ADDENDUM NOTE
Addendum  created 08/23/24 1623 by Marshall Candelaria MD    Attestation recorded in Intraprocedure, Clinical Note Signed, Intraprocedure Attestations filed, Intraprocedure Event deleted, Intraprocedure Event edited

## 2024-08-23 NOTE — ANESTHESIA POSTPROCEDURE EVALUATION
Patient: Swetha Patterson    Procedure: Procedure(s):  Anesthesia out of OR MRI lumbar spine       Anesthesia Type:  General    Note:     Postop Pain Control: Uneventful            Sign Out: Well controlled pain   PONV: No   Neuro/Psych: Uneventful            Sign Out: Acceptable/Baseline neuro status   Airway/Respiratory: Uneventful            Sign Out: Acceptable/Baseline resp. status   CV/Hemodynamics: Uneventful            Sign Out: Acceptable CV status   Other NRE: NONE   DID A NON-ROUTINE EVENT OCCUR? No           Last vitals:  Vitals Value Taken Time   /73 08/22/24 0845   Temp 97  F (36.1  C) 08/22/24 0825   Pulse 89 08/22/24 0858   Resp 8 08/22/24 0900   SpO2 89 % 08/22/24 0901   Vitals shown include unfiled device data.    Electronically Signed By: Marshall Candelaria MD  August 23, 2024  4:22 PM

## 2024-08-27 ENCOUNTER — TELEPHONE (OUTPATIENT)
Dept: INTERNAL MEDICINE | Facility: CLINIC | Age: 54
End: 2024-08-27
Payer: COMMERCIAL

## 2024-08-27 NOTE — TELEPHONE ENCOUNTER
8/26/24 Missed visit note recieved via fax & Home health certfication and Plan of care 8/21/24 to 10/19/24 recieved via fax. Form in your mailbox for signature.

## 2024-08-28 DIAGNOSIS — R21 RASH: ICD-10-CM

## 2024-08-28 RX ORDER — NYSTATIN 100000 [USP'U]/G
POWDER TOPICAL
Qty: 120 G | Refills: 0 | Status: SHIPPED | OUTPATIENT
Start: 2024-08-28 | End: 2024-09-30

## 2024-09-04 ENCOUNTER — TELEPHONE (OUTPATIENT)
Dept: INTERNAL MEDICINE | Facility: CLINIC | Age: 54
End: 2024-09-04
Payer: COMMERCIAL

## 2024-09-04 RX ORDER — MELOXICAM 15 MG/1
15 TABLET ORAL
OUTPATIENT
Start: 2024-09-04

## 2024-09-05 ENCOUNTER — TELEPHONE (OUTPATIENT)
Dept: INTERNAL MEDICINE | Facility: CLINIC | Age: 54
End: 2024-09-05

## 2024-09-05 DIAGNOSIS — Z53.9 DIAGNOSIS NOT YET DEFINED: Primary | ICD-10-CM

## 2024-09-05 PROCEDURE — G0179 MD RECERTIFICATION HHA PT: HCPCS | Performed by: INTERNAL MEDICINE

## 2024-09-09 DIAGNOSIS — M79.7 FIBROMYALGIA: ICD-10-CM

## 2024-09-12 RX ORDER — GABAPENTIN 100 MG/1
100 CAPSULE ORAL 3 TIMES DAILY
Qty: 90 CAPSULE | Refills: 4 | Status: SHIPPED | OUTPATIENT
Start: 2024-09-12

## 2024-09-13 ENCOUNTER — TELEPHONE (OUTPATIENT)
Dept: INTERNAL MEDICINE | Facility: CLINIC | Age: 54
End: 2024-09-13
Payer: COMMERCIAL

## 2024-09-13 NOTE — TELEPHONE ENCOUNTER
Forms/Letter Request    Type of form/letter: Home Health Certification 8/21/2024 to 10/19/2024      Do we have the form/letter: Yes: placed in provider mailbox for signature    Who is the form from? [facility] # [id number]    Where did/will the form come from? form was faxed in    When is form/letter needed by: 5-7    How would you like the form/letter returned: Fax : [phone]    Patient Notified form requests are processed in 5-7 business days:Yes    Could we send this information to you in [facility] or would you prefer to receive a phone call?:   NA

## 2024-09-17 DIAGNOSIS — R10.13 EPIGASTRIC PAIN: ICD-10-CM

## 2024-09-17 DIAGNOSIS — J44.9 COPD, MODERATE (H): ICD-10-CM

## 2024-09-17 RX ORDER — BUDESONIDE AND FORMOTEROL FUMARATE DIHYDRATE 160; 4.5 UG/1; UG/1
2 AEROSOL RESPIRATORY (INHALATION) 2 TIMES DAILY
Qty: 30.6 G | Refills: 0 | Status: SHIPPED | OUTPATIENT
Start: 2024-09-17

## 2024-09-17 RX ORDER — SUCRALFATE 1 G/1
TABLET ORAL
Qty: 120 TABLET | Refills: 0 | Status: SHIPPED | OUTPATIENT
Start: 2024-09-17

## 2024-09-29 DIAGNOSIS — R21 RASH: ICD-10-CM

## 2024-09-30 RX ORDER — NYSTATIN 100000 [USP'U]/G
POWDER TOPICAL
Qty: 120 G | Refills: 0 | Status: SHIPPED | OUTPATIENT
Start: 2024-09-30

## 2024-10-01 ENCOUNTER — TELEPHONE (OUTPATIENT)
Dept: INTERNAL MEDICINE | Facility: CLINIC | Age: 54
End: 2024-10-01
Payer: COMMERCIAL

## 2024-10-11 DIAGNOSIS — E78.5 HYPERLIPIDEMIA LDL GOAL <130: Chronic | ICD-10-CM

## 2024-10-11 RX ORDER — ROSUVASTATIN CALCIUM 40 MG/1
40 TABLET, COATED ORAL DAILY
Qty: 90 TABLET | Refills: 2 | Status: SHIPPED | OUTPATIENT
Start: 2024-10-11

## 2024-10-12 DIAGNOSIS — R10.13 EPIGASTRIC PAIN: ICD-10-CM

## 2024-10-14 RX ORDER — SUCRALFATE 1 G/1
TABLET ORAL
Qty: 120 TABLET | Refills: 0 | Status: SHIPPED | OUTPATIENT
Start: 2024-10-14

## 2024-10-19 NOTE — ANESTHESIA PREPROCEDURE EVALUATION
"Anesthesia Pre-Procedure Evaluation    Patient: Swetha Patterson   MRN: [id number] : 1970        Procedure : Procedure(s):  EXCISION, MASS, NECK left          Past Medical History:   Diagnosis Date     Anxiety state, unspecified      Arthritis      Asthma      Chronic pain     back pain from cyst     Contact dermatitis and other eczema, due to unspecified cause      COPD (chronic obstructive pulmonary disease) (H)      Depressive disorder      Depressive disorder, not elsewhere classified      Diabetes (H)      Emphysema with chronic bronchitis (H)      Esophageal reflux      Family history of ischemic heart disease      Fibromyalgia      Gastro-oesophageal reflux disease      Heart disease     has chest pain sometimes     History of emphysema      Hoarseness      HTN, goal below 140/90      Hyperlipidemia LDL goal <130      Liver disease     \"fatty liver\"     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)      PONV (postoperative nausea and vomiting)      Rectal bleeding      Thyroid disease       Past Surgical History:   Procedure Laterality Date     ABDOMEN SURGERY       ANESTHESIA OUT OF OR MRI N/A 10/07/2021    Procedure: ANESTHESIA OUT OF OR MRI;  Surgeon: GENERIC ANESTHESIA PROVIDER;  Location: [facility] OR     ARTHROSCOPY SHOULDER DECOMPRESSION Left 10/21/2015    Procedure: ARTHROSCOPY SHOULDER DECOMPRESSION;  Surgeon: Julien Milian MD;  Location: [facility] OR     BIOPSY ARTERY TEMPORAL Left 2020    Procedure: LEFT TEMPORAL ARTERY BIOPSY;  Surgeon: Ibeth Conner MD;  Location: [facility] OR     BRONCHIAL THERMOPLASTY N/A 2014    Procedure: BRONCHIAL THERMOPLASTY;  Surgeon: Ward Whitaker MD;  Location: [facility]     BRONCHIAL THERMOPLASTY N/A 2014    Procedure: BRONCHIAL THERMOPLASTY;  Surgeon: Ward Whitaker MD;  [facility]: [facility]     BRONCHIAL THERMOPLASTY N/A 2015    Procedure: BRONCHIAL THERMOPLASTY;  Surgeon: Ward Whitaker MD;  [facility]     COLONOSCOPY N/A " 11/26/2018    Procedure: COLONOSCOPY ([facility]);  Surgeon: Marshall Oakes MD;  Location:  OR     COLONOSCOPY N/A 10/22/2020    Procedure: Colonoscopy;  Surgeon: Marshall Mccormick MD;  Location:  OR     COLONOSCOPY N/A 1/20/2023    Procedure: COLONOSCOPY, FLEXIBLE, WITH LESION REMOVAL USING SNARE;  Surgeon: Carson Domínguez MD;  Location:  GI     DISCECTOMY, FUSION CERVICAL ANTERIOR ONE LEVEL, COMBINED N/A 05/01/2018    Procedure: COMBINED DISCECTOMY, FUSION CERVICAL ANTERIOR ONE LEVEL;  1.  C5-C6 anterior cervical diskectomy and fusion.    2.  C5-C6 application of intervertebral biomechanical device for interbody fusion purposes.    3.  C5-C6 anterior instrumentation using the standard Kristin InViZia 24 mm plate with four associated bone screws, 12 mm in length.  Inferior screws are fixed.  Superior screws are va     ENT SURGERY  2015    polyps removed from vocal cords      ESOPHAGOSCOPY, GASTROSCOPY, DUODENOSCOPY (EGD), COMBINED N/A 10/22/2020    Procedure: Esophagoscopy, gastroscopy, duodenoscopy with biopsies;  Surgeon: Marshall Mccormick MD;  Location:  OR     ESOPHAGOSCOPY, GASTROSCOPY, DUODENOSCOPY (EGD), COMBINED N/A 06/17/2021    Procedure: ESOPHAGOGASTRODUODENOSCOPY, WITH BIOPSY;  Surgeon: Darrel Damon MD;  Location:  GI     EXCISE MASS TRUNK Left 11/03/2021    Procedure: EXCISION LEFT SHOULDER LIPOMA;  Surgeon: Ibeth Conner MD;  Location:  OR     EXCISE NODE MEDIASTINAL  04/26/2013    Procedure: EXCISE NODE MEDIASTINAL;;  Surgeon: Av Peña MD;  Location:  OR     HEAD & NECK SURGERY  2018    Had my five and six fused in my neck     LAPAROSCOPIC CHOLECYSTECTOMY N/A 10/19/2017    Procedure: LAPAROSCOPIC CHOLECYSTECTOMY;  LAPAROSCOPIC CHOLECYSTECTOMY;  Surgeon: Ney Jerry MD;  Location:  OR     THORACOSCOPY  04/26/2013    Procedure: THORACOSCOPY;  LEFT VIDEO ASSISTED THORACOSCOPY, RESECTION OF POSTERIOR MEDIASTINAL MASS;  Surgeon: Av Peña  MD Mehrdad;  Location: [facility] OR     TONSILLECTOMY  as a kid     TONSILLECTOMY       ZZC APPENDECTOMY  at age 17      Allergies   Allergen Reactions     Codeine Nausea and Vomiting     vomiting     Cyclobenzaprine Nausea     Gabapentin Rash     Hydrocodone Nausea and Vomiting     Sulfa Drugs Nausea and Vomiting     Nausea      Social History     Tobacco Use     Smoking status: Former     Types: Other     Smokeless tobacco: Former     Quit date: 2/13/2022     Tobacco comments:     Quit smoking prior to bariatric surgery and because of my COPD   Substance Use Topics     Alcohol use: Not Currently      Wt Readings from Last 1 Encounters:   02/01/23 95.8 kg (211 lb 3.2 oz)        Anesthesia Evaluation   Pt has had prior anesthetic.     History of anesthetic complications  - PONV.      ROS/MED HX  ENT/Pulmonary:     (+) tobacco use, Past use, asthma COPD,     Neurologic:       Cardiovascular:     (+) Dyslipidemia hypertension-----    METS/Exercise Tolerance:     Hematologic:       Musculoskeletal:       GI/Hepatic:     (+) GERD,     Renal/Genitourinary:       Endo:     (+) type II DM, Obesity,     Psychiatric/Substance Use:     (+) psychiatric history anxiety     Infectious Disease:       Malignancy:       Other:            Physical Exam    Airway        Mallampati: III   TM distance: > 3 FB   Neck ROM: full     Respiratory Devices and Support         Dental           Cardiovascular   cardiovascular exam normal          Pulmonary   pulmonary exam normal                OUTSIDE LABS:  CBC:   Lab Results   Component Value Date    WBC 9.0 12/23/2022    WBC 8.2 08/17/2022    HGB 11.7 12/23/2022    HGB 13.1 08/17/2022    HCT 36.3 12/23/2022    HCT 40.0 08/17/2022     12/23/2022     08/17/2022     BMP:   Lab Results   Component Value Date     12/23/2022     08/17/2022    POTASSIUM 4.2 12/23/2022    POTASSIUM 4.0 08/17/2022    CHLORIDE 101 12/23/2022    CHLORIDE 103 08/17/2022    CO2 24 12/23/2022    CO2  26 08/17/2022    BUN 17.4 12/23/2022    BUN 15 08/17/2022    CR 0.96 (H) 12/23/2022    CR 0.80 08/17/2022     (H) 02/01/2023     (H) 12/23/2022     COAGS:   Lab Results   Component Value Date    INR 0.99 11/20/2020     POC:   Lab Results   Component Value Date    BGM 90 11/21/2020    HCG Negative 12/23/2022    HCGS Negative 11/12/2021     HEPATIC:   Lab Results   Component Value Date    ALBUMIN 3.9 08/17/2022    PROTTOTAL 7.5 08/17/2022    ALT 47 08/17/2022    AST 24 08/17/2022    ALKPHOS 179 (H) 08/17/2022    BILITOTAL 0.2 08/17/2022     OTHER:   Lab Results   Component Value Date    PH 7.38 11/12/2021    LACT 1.8 11/12/2021    A1C 5.5 08/17/2022    GENE 9.2 12/23/2022    MAG 1.9 03/19/2013    LIPASE 101 05/22/2021    TSH 6.29 (H) 08/17/2022    T4 0.96 08/17/2022    CRP 16.0 (H) 02/20/2020    SED 16 02/20/2020       Anesthesia Plan    ASA Status:  3   NPO Status:  NPO Appropriate    Anesthesia Type: General.     - Airway: ETT   Induction: Intravenous.   Maintenance: Balanced.        Consents    Anesthesia Plan(s) and associated risks, benefits, and realistic alternatives discussed. Questions answered and patient/representative(s) expressed understanding.    - Discussed:     - Discussed with:  Patient         Postoperative Care    Pain management: IV analgesics, Oral pain medications, Multi-modal analgesia.   PONV prophylaxis: Ondansetron (or other 5HT-3), Dexamethasone or Solumedrol, Scopolamine patch     Comments:                Yonathan Shields MD   Yes - the patient is able to be screened

## 2024-10-21 DIAGNOSIS — E11.9 TYPE 2 DIABETES MELLITUS WITHOUT COMPLICATION, WITHOUT LONG-TERM CURRENT USE OF INSULIN (H): ICD-10-CM

## 2024-10-21 RX ORDER — ORAL SEMAGLUTIDE 14 MG/1
TABLET ORAL DAILY
Qty: 90 TABLET | Refills: 0 | Status: SHIPPED | OUTPATIENT
Start: 2024-10-21

## 2024-10-22 ENCOUNTER — TELEPHONE (OUTPATIENT)
Dept: INTERNAL MEDICINE | Facility: CLINIC | Age: 54
End: 2024-10-22
Payer: COMMERCIAL

## 2024-10-22 DIAGNOSIS — Z71.6 ENCOUNTER FOR SMOKING CESSATION COUNSELING: ICD-10-CM

## 2024-10-22 RX ORDER — BUPROPION HYDROCHLORIDE 150 MG/1
TABLET ORAL
Qty: 90 TABLET | Refills: 2 | Status: SHIPPED | OUTPATIENT
Start: 2024-10-22

## 2024-10-24 DIAGNOSIS — Z53.9 DIAGNOSIS NOT YET DEFINED: Primary | ICD-10-CM

## 2024-10-24 DIAGNOSIS — I10 HTN, GOAL BELOW 140/90: ICD-10-CM

## 2024-10-24 PROCEDURE — G0179 MD RECERTIFICATION HHA PT: HCPCS | Performed by: INTERNAL MEDICINE

## 2024-10-24 RX ORDER — LISINOPRIL 10 MG/1
10 TABLET ORAL DAILY
Qty: 90 TABLET | Refills: 0 | Status: SHIPPED | OUTPATIENT
Start: 2024-10-24

## 2024-10-24 NOTE — TELEPHONE ENCOUNTER
Form completed and faxed to [phone]  [facility]

## 2024-10-28 ASSESSMENT — PATIENT HEALTH QUESTIONNAIRE - PHQ9: SUM OF ALL RESPONSES TO PHQ QUESTIONS 1-9: 27

## 2024-10-30 DIAGNOSIS — L30.9 ECZEMA, UNSPECIFIED TYPE: ICD-10-CM

## 2024-10-31 RX ORDER — HYDROCORTISONE 25 MG/G
CREAM TOPICAL
Qty: 30 G | Refills: 1 | Status: SHIPPED | OUTPATIENT
Start: 2024-10-31

## 2024-10-31 ASSESSMENT — PATIENT HEALTH QUESTIONNAIRE - PHQ9: SUM OF ALL RESPONSES TO PHQ QUESTIONS 1-9: 17

## 2024-11-01 ENCOUNTER — TELEPHONE (OUTPATIENT)
Dept: INTERNAL MEDICINE | Facility: CLINIC | Age: 54
End: 2024-11-01
Payer: COMMERCIAL

## 2024-11-08 DIAGNOSIS — R73.03 PREDIABETES: ICD-10-CM

## 2024-11-10 ENCOUNTER — NURSE TRIAGE (OUTPATIENT)
Dept: NURSING | Facility: CLINIC | Age: 54
End: 2024-11-10
Payer: COMMERCIAL

## 2024-11-10 NOTE — TELEPHONE ENCOUNTER
Nurse Triage SBAR    Is this a 2nd Level Triage? Yes    Situation/Background:   Patient is calling with new dizziness and has had several falls yesterday and today. Legs felt wobbly and the patient fell. Patient also had dizziness several days ago, had to sit down and the dizziness resolved.   Patient states she has had dizziness in the past.     Patient checks blood sugars twice daily - recent blood sugars have been 121, 136, 154.    Assessment:   Patient felt very weak yesterday, had to hang onto the walls  Patient states she had numbness in entire body  Patient states she had numbness in left side of body yesterday  Patient states she had numbness in left arm which has resolved  Patient states she has had memory problems for five months  Patient states she has fallen in the bathtub a few times recently, did not hit her head - is now using a bath chair    Recommendation: Per disposition, Go to ED now (or PCP Triage). Reviewed Care Advice with patient and verbalizes understanding. Declines additional questions. Advised patient to call back with any new or worsening symptoms. Patient verbalized understanding and agrees with plan.    Protocol Recommended Disposition: Paged/Called Provider      Provider consult indicated.  Reason for page: Disposition is Go to ED now (or PCP Triage)    Call placed to Dr. Reginaldo Stewart by RN at 4:39 PM.   Ok for patient to go to ED for evaluation  Hermelinda Solis RN    Provider Recommendation Follow Up:   Unable to reach patient/caregiver. Left message to return call to [phone] (To enter phone number for call back directly to clinic/staff member). Upon return call please notify caller of provider's recommendations.    }     Provider Recommendation Follow Up:   Patient returned call. Informed of provider's recommendations. Patient verbalized understanding and agrees with the plan.         Hermelinda Solis RN on 11/10/2024 at 4:21 PM  [facility]  Reason for  Disposition   [1] Numbness (i.e., loss of sensation) of the face, arm / hand, or leg / foot on one side of the body AND [2] sudden onset AND [3] brief (now gone)    Additional Information   Negative: [1] Weakness (i.e., paralysis, loss of muscle strength) of the face, arm or leg on one side of the body AND [2] sudden onset AND [3] present now   Negative: [1] Numbness (i.e., loss of sensation) of the face, arm or leg on one side of the body AND [2] sudden onset AND [3] present now   Negative: [1] Loss of speech or garbled speech AND [2] sudden onset AND [3] present now   Negative: Difficult to awaken or acting confused (e.g., disoriented, slurred speech)   Negative: Sounds like a life-threatening emergency to the triager   Negative: Followed a head injury   Negative: Followed an ear injury   Negative: Localized weakness or numbness is main symptom   Negative: Dizziness relates to riding in a car, going to an amusement park, etc.   Negative: [1] Dizziness is main symptom AND [2] NO spinning sensation (i.e., vertigo)   Negative: SEVERE dizziness (vertigo) (e.g., unable to walk without assistance)   Negative: Severe headache (e.g., excruciating)   (Exception: Similar to previous migraines.)   Negative: [1] Dizziness (vertigo) present now AND [2] one or more STROKE RISK FACTORS (i.e., hypertension, diabetes, prior stroke/TIA, heart attack)  (Exception: Prior doctor or NP/PA evaluation for this AND no different/worse than usual.)   Negative: [1] Dizziness (vertigo) present now AND [2] age > 59   (Exception: Prior doctor or NP/PA evaluation for this AND no different/worse than usual.)   Negative: [1] Weakness (i.e., paralysis, loss of muscle strength) of the face, arm / hand, or leg / foot on one side of the body AND [2] sudden onset AND [3] brief (now gone)    Protocols used: Dizziness - Vertigo-A-

## 2024-11-10 NOTE — TELEPHONE ENCOUNTER
Patient returning call and wants to speak with RN she spoke with before. I connected the call to the RN.  Ayde Carroll RN  [facility]

## 2024-11-22 DIAGNOSIS — R10.13 EPIGASTRIC PAIN: ICD-10-CM

## 2024-11-23 DIAGNOSIS — E11.9 TYPE 2 DIABETES MELLITUS WITHOUT COMPLICATION, WITHOUT LONG-TERM CURRENT USE OF INSULIN (H): ICD-10-CM

## 2024-11-25 ENCOUNTER — TELEPHONE (OUTPATIENT)
Dept: INTERNAL MEDICINE | Facility: CLINIC | Age: 54
End: 2024-11-25
Payer: COMMERCIAL

## 2024-11-25 RX ORDER — SUCRALFATE 1 G/1
TABLET ORAL
Qty: 120 TABLET | Refills: 0 | Status: SHIPPED | OUTPATIENT
Start: 2024-11-25

## 2024-11-25 RX ORDER — LANCETS
EACH MISCELLANEOUS
Qty: 100 EACH | Refills: 0 | Status: SHIPPED | OUTPATIENT
Start: 2024-11-25

## 2024-11-25 NOTE — TELEPHONE ENCOUNTER
"Call to patient. States this dizziness occurs \"once in a while\" but not as bad as 11/22/24 and not with chest pain. ER note references a couple of falls within the week prior to the ER visit. Patient states those falls were also related to dizziness and it comes out of the middle of nowhere. States it can occur with movement and with rest. Advised ER follow up appointment. Scheduled.   "

## 2024-11-25 NOTE — TELEPHONE ENCOUNTER
Symptoms    Describe your symptoms: dizzy and falls    Any pain: No    How long have you been having symptoms: 3   days    Have you been seen for this:  Yes: ER 11-.    Appointment offered?: No    Triage offered?: Yes: unable to connect patient with RN at this time     Home remedies tried: no    Preferred Pharmacy:     Could we send this information to you in [facility] or would you prefer to receive a phone call?:   Patient would prefer a phone call     Okay to leave a detailed message?: No at Home number on file [phone] (home)

## 2024-11-27 ENCOUNTER — OFFICE VISIT (OUTPATIENT)
Dept: INTERNAL MEDICINE | Facility: CLINIC | Age: 54
End: 2024-11-27
Payer: COMMERCIAL

## 2024-11-27 VITALS
SYSTOLIC BLOOD PRESSURE: 165 MMHG | OXYGEN SATURATION: 98 % | WEIGHT: 221 LBS | BODY MASS INDEX: 40.67 KG/M2 | TEMPERATURE: 98.2 F | RESPIRATION RATE: 18 BRPM | HEIGHT: 62 IN | DIASTOLIC BLOOD PRESSURE: 90 MMHG | HEART RATE: 81 BPM

## 2024-11-27 DIAGNOSIS — I10 HTN, GOAL BELOW 140/90: ICD-10-CM

## 2024-11-27 DIAGNOSIS — H81.13 BENIGN PAROXYSMAL POSITIONAL VERTIGO DUE TO BILATERAL VESTIBULAR DISORDER: ICD-10-CM

## 2024-11-27 DIAGNOSIS — Z91.81 HISTORY OF RECENT FALL: ICD-10-CM

## 2024-11-27 DIAGNOSIS — Z79.899 POLYPHARMACY: ICD-10-CM

## 2024-11-27 DIAGNOSIS — R42 DIZZINESS: Primary | ICD-10-CM

## 2024-11-27 RX ORDER — LISINOPRIL 10 MG/1
20 TABLET ORAL DAILY
Qty: 90 TABLET | Refills: 0 | Status: SHIPPED | OUTPATIENT
Start: 2024-11-27

## 2024-11-27 ASSESSMENT — PAIN SCALES - GENERAL: PAINLEVEL_OUTOF10: SEVERE PAIN (6)
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
## 2024-12-07 NOTE — TELEPHONE ENCOUNTER
Last time medication was refilled 05/06/2024  Last office visit  11/01/2024  Next office visit due/scheduled   Future Appointments   Date Time Provider Department Center   3/21/2025  8:30 AM Timi Mcduffie MD [facility] [address]     Medication passed protocol, refill sent.       Spoke with patient and moved her up to tomorrow, requested images from [facility].  \Mary Jones RN

## 2024-12-12 ENCOUNTER — PATIENT OUTREACH (OUTPATIENT)
Dept: CARE COORDINATION | Facility: CLINIC | Age: 54
End: 2024-12-12
Payer: COMMERCIAL
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
## 2024-12-16 DIAGNOSIS — J44.9 COPD, MODERATE (H): ICD-10-CM

## 2024-12-16 RX ORDER — BUDESONIDE AND FORMOTEROL FUMARATE DIHYDRATE 160; 4.5 UG/1; UG/1
2 AEROSOL RESPIRATORY (INHALATION) 2 TIMES DAILY
Qty: 30.6 G | Refills: 1 | Status: SHIPPED | OUTPATIENT
Start: 2024-12-16

## 2024-12-19 ENCOUNTER — MEDICAL CORRESPONDENCE (OUTPATIENT)
Dept: HEALTH INFORMATION MANAGEMENT | Facility: CLINIC | Age: 54
End: 2024-12-19

## 2024-12-20 ENCOUNTER — TELEPHONE (OUTPATIENT)
Dept: INTERNAL MEDICINE | Facility: CLINIC | Age: 54
End: 2024-12-20
Payer: COMMERCIAL

## 2024-12-22 ENCOUNTER — HEALTH MAINTENANCE LETTER (OUTPATIENT)
Age: 54
End: 2024-12-22

## 2024-12-23 DIAGNOSIS — Z53.9 DIAGNOSIS NOT YET DEFINED: Primary | ICD-10-CM

## 2024-12-23 PROCEDURE — G0179 MD RECERTIFICATION HHA PT: HCPCS | Performed by: INTERNAL MEDICINE

## 2024-12-24 ENCOUNTER — OFFICE VISIT (OUTPATIENT)
Dept: INTERNAL MEDICINE | Facility: CLINIC | Age: 54
End: 2024-12-24
Payer: COMMERCIAL

## 2024-12-24 VITALS
WEIGHT: 221 LBS | BODY MASS INDEX: 40.67 KG/M2 | SYSTOLIC BLOOD PRESSURE: 133 MMHG | HEART RATE: 82 BPM | DIASTOLIC BLOOD PRESSURE: 75 MMHG | TEMPERATURE: 97.6 F | RESPIRATION RATE: 20 BRPM | OXYGEN SATURATION: 98 % | HEIGHT: 62 IN

## 2024-12-24 DIAGNOSIS — H81.10 BENIGN PAROXYSMAL POSITIONAL VERTIGO, UNSPECIFIED LATERALITY: ICD-10-CM

## 2024-12-24 DIAGNOSIS — R29.898 LEG WEAKNESS, BILATERAL: ICD-10-CM

## 2024-12-24 DIAGNOSIS — R42 DIZZINESS: ICD-10-CM

## 2024-12-24 DIAGNOSIS — L30.9 ECZEMA OF BOTH HANDS: ICD-10-CM

## 2024-12-24 DIAGNOSIS — R26.9 ABNORMAL GAIT: ICD-10-CM

## 2024-12-24 DIAGNOSIS — H10.33 ACUTE CONJUNCTIVITIS OF BOTH EYES, UNSPECIFIED ACUTE CONJUNCTIVITIS TYPE: Primary | ICD-10-CM

## 2024-12-24 DIAGNOSIS — L30.9 ECZEMA OF FACE: ICD-10-CM

## 2024-12-24 DIAGNOSIS — E66.01 MORBID OBESITY (H): ICD-10-CM

## 2024-12-24 DIAGNOSIS — R60.0 BILATERAL LEG EDEMA: ICD-10-CM

## 2024-12-24 DIAGNOSIS — J44.9 COPD, MODERATE (H): ICD-10-CM

## 2024-12-24 PROCEDURE — G2211 COMPLEX E/M VISIT ADD ON: HCPCS | Performed by: INTERNAL MEDICINE

## 2024-12-24 PROCEDURE — 99215 OFFICE O/P EST HI 40 MIN: CPT | Performed by: INTERNAL MEDICINE

## 2024-12-24 RX ORDER — HYDROCORTISONE 25 MG/G
CREAM TOPICAL 2 TIMES DAILY
Qty: 30 G | Refills: 0 | Status: SHIPPED | OUTPATIENT
Start: 2024-12-24

## 2024-12-24 RX ORDER — OFLOXACIN 3 MG/ML
1-2 SOLUTION/ DROPS OPHTHALMIC
Qty: 10 ML | Refills: 0 | Status: SHIPPED | OUTPATIENT
Start: 2024-12-24

## 2024-12-24 RX ORDER — TRIAMCINOLONE ACETONIDE 1 MG/G
CREAM TOPICAL 2 TIMES DAILY
Qty: 30 G | Refills: 1 | Status: SHIPPED | OUTPATIENT
Start: 2024-12-24

## 2024-12-24 ASSESSMENT — PAIN SCALES - GENERAL: PAINLEVEL_OUTOF10: MODERATE PAIN (4)

## 2024-12-24 NOTE — PATIENT INSTRUCTIONS
Plan:  Ofloxacin eye drops   2. Cerave or Cetaphil cream 3 times a day at least or after washing hands   3. Triamcinolone cream once during the day and at bed time ( after applying the moisturizing cream)   4. Vaseline to the lips  5. Hydrocortisone cream to lips and chin twice a day   6. Physical therapy for dizziness  7. Neurology referral for leg weakness   8. Brain MRI  -- To schedule this test you may call Scheduling center at [phone]   It is the patient responsibility to check with insurance for coverage before you go for the test.   9.  Recliner chair with lift mechanism  10. Schedule ANNUAL EXAM -- in Jan -Feb

## 2024-12-24 NOTE — PROGRESS NOTES
Good  Patient's instructions / PLAN:                                                        Plan:  Ofloxacin eye drops   2. Cerave or Cetaphil cream 3 times a day at least or after washing hands   3. Triamcinolone cream once during the day and at bed time ( after applying the moisturizing cream)   4. Vaseline to the lips  5. Hydrocortisone cream to lips and chin twice a day   6. Physical therapy for dizziness  7. Neurology referral for leg weakness   8. Brain MRI  -- To schedule this test you may call Scheduling center at [phone]   It is the patient responsibility to check with insurance for coverage before you go for the test.   9.  Recliner chair with lift mechanism  10. Schedule ANNUAL EXAM -- in Jan -Feb          ASSESSMENT & PLAN:                                                      (H10.33) Acute conjunctivitis of both eyes, unspecified acute conjunctivitis type  (primary encounter diagnosis)  Comment:   We discussed about the new meds, advantages and potential side effects. The patient will read also the info from the pharmacy and call back if questions.   Plan: ofloxacin (OCUFLOX) 0.3 % ophthalmic solution            (L30.9) Eczema of both hands  Comment:   Plan: triamcinolone (KENALOG) 0.1 % external cream            (L30.9) Eczema of face  Comment:   Plan: hydrocortisone 2.5 % cream            (H81.10) Benign paroxysmal positional vertigo, unspecified laterality  Comment:   Plan: Physical Therapy  Referral            (E66.01) Morbid obesity (H)  Comment:   Plan: Adult Comprehensive Weight Management         Referral            (R29.898) Leg weakness, bilateral  Comment: new, progressive  Plan: Adult Neurology  Referral, Adult         Neurology  Referral, MR Brain w/o & w         Contrast, Miscellaneous DME Supply Order (Use         only if a more specific DME order does not         already exist)            (R26.9) Abnormal gait  Comment:   Plan: MR Brain w/o & w  "Contrast, Miscellaneous DME         Supply Order (Use only if a more specific DME         order does not already exist)            (R42) Dizziness  Comment:   Plan: MR Brain w/o & w Contrast, Miscellaneous DME         Supply Order (Use only if a more specific DME         order does not already exist)            (R60.0) Bilateral leg edema  Comment: Under control  Plan: Miscellaneous DME Supply Order (Use only if a         more specific DME order does not already exist)            (J44.9) COPD, moderate (H)  Comment: Stable  Plan: Miscellaneous DME Supply Order (Use only if a         more specific DME order does not already exist)               Chief Complaint:                                                      Multiple issues to be addressed today  Face to face encounter for recliner with lift mechanism      SUBJECTIVE:                                                    History of present illness     Smoking -- down to 3 cigarettes a day .  No recent COPD exacerbation    Eyes  -- discharge and eyelids stuck together this am  -- x 3 w no resolving     Hands  -- rash on palms and fingers  -- R 4th finger w crack    Lips  -- exzema and dry.  Chin is also irritated.    Leg weakness   -- x 3 months  -- she can't get off the cough  -- falls freq  -- cerv surgery -- 3-4 y ago    Dizziness  -- the rooms spins when she turns head quickly  -- x 1 m getting worse.    Wt -- wants ref -- done       ROS:                                                      ROS: negative for fever, chills, cough, wheezes, chest pain, shortness of breath, vomiting, abdominal pain, leg swelling   Positive for chronic cough, but less since she smoked less      OBJECTIVE:                                                    Physical Exam :    Blood pressure 133/75, pulse 82, temperature 97.6  F (36.4  C), resp. rate 20, height 1.575 m (5' 2\"), weight 100.2 kg (221 lb), last menstrual period 01/01/2014, SpO2 98%, not currently breastfeeding.   NAD, " appears comfortable  Skin: no rashes   HEENT: PERRLA, EOMI, erythematous conjunctiva, anicteric sclerae,   Neck: supple, no JVD, No thyroidmegaly. Lymph nodes nonpalpable cervical and supraclavicular.  Chest: clear to auscultation bilaterally, good respiratory effort  Heart: S1 S2, RRR, no mgr appreciated  Abdomen: soft, not tender,   Extremities: no edema,   Neurologic: A, Ox3, no focal signs appreciated.  She cannot stand from chair without using her arms    PMHx: reviewed  Past Medical History:   Diagnosis Date    Benign essential hypertension     Bipolar disorder (H)     Chronic abdominal pain     Chronic constipation     COPD (chronic obstructive pulmonary disease) (H)     Hypothyroidism     Impaired fasting glucose     Kidney stone     Obesity (BMI 30-39.9)     PONV (postoperative nausea and vomiting)     Pre-diabetes     Pure hypercholesterolemia       PSHx: reviewed  Past Surgical History:   Procedure Laterality Date    ANESTHESIA OUT OF OR MRI N/A 10/07/2021    Procedure: ANESTHESIA OUT OF OR MRI;  Surgeon: GENERIC ANESTHESIA PROVIDER;  Location: [facility]    ANESTHESIA OUT OF OR MRI N/A 5/11/2023    Procedure: MRI OF NECK WITH AND WITHOUT CONTRAST;  Surgeon: GENERIC ANESTHESIA PROVIDER;  Location: [facility]    ANESTHESIA OUT OF OR MRI Left 6/26/2023    Procedure: Anesthesia out of OR MRI;  Surgeon: GENERIC ANESTHESIA PROVIDER;  [facility]: [facility]    ANESTHESIA OUT OF OR MRI N/A 8/22/2024    Procedure: Anesthesia out of OR MRI lumbar spine;  Surgeon: GENERIC ANESTHESIA PROVIDER;  Location: [facility]    APPENDECTOMY      ARTHROSCOPY SHOULDER DECOMPRESSION Left 10/21/2015    Procedure: ARTHROSCOPY SHOULDER DECOMPRESSION;  Surgeon: Julien Milian MD;  Location: [facility]    BIOPSY ARTERY TEMPORAL Left 03/11/2020    Procedure: LEFT TEMPORAL ARTERY BIOPSY;  Surgeon: Ibeth Conner MD;  [facility]: [facility]    BRONCHIAL THERMOPLASTY N/A 11/14/2014    Procedure: BRONCHIAL THERMOPLASTY;  Surgeon: Ward Whitaker MD;   [facility]    BRONCHIAL THERMOPLASTY N/A 12/19/2014    Procedure: BRONCHIAL THERMOPLASTY;  Surgeon: Ward Whitaker MD;  [facility]    BRONCHIAL THERMOPLASTY N/A 02/06/2015    Procedure: BRONCHIAL THERMOPLASTY;  Surgeon: Ward Whitaker MD;  [facility]    COLONOSCOPY N/A 11/26/2018    Procedure: COLONOSCOPY ([facility]);  Surgeon: Marshall Oakes MD;  [facility]: [facility]    COLONOSCOPY N/A 10/22/2020    Procedure: Colonoscopy;  Surgeon: Marshall Mccormick MD;  Location: [facility]    COLONOSCOPY N/A 01/20/2023    Procedure: COLONOSCOPY, FLEXIBLE, WITH LESION REMOVAL USING SNARE;  Surgeon: Carson Domínguez MD;  Location: [facility]    COMBINED CYSTOSCOPY, RETROGRADES, URETEROSCOPY, LASER HOLMIUM LITHOTRIPSY URETER(S), INSERT STENT Right 7/19/2023    Procedure: Cystoscopy, Right Ureteroscopy, Right Retrograde Pyelogram;  Surgeon: Sammy Herndon MD;  [place] [facility] [place]    DISCECTOMY, FUSION CERVICAL ANTERIOR ONE LEVEL, COMBINED N/A 05/01/2018    Procedure: COMBINED DISCECTOMY, FUSION CERVICAL ANTERIOR ONE LEVEL;  1.  C5-C6 anterior cervical diskectomy and fusion.    2.  C5-C6 application of intervertebral biomechanical device for interbody fusion purposes.    3.  C5-C6 anterior instrumentation using the standard Kristin InViZia 24 mm plate with four associated bone screws, 12 mm in length.  Inferior screws are fixed.  Superior screws are va    ESOPHAGOSCOPY, GASTROSCOPY, DUODENOSCOPY (EGD), COMBINED N/A 10/22/2020    Procedure: Esophagoscopy, gastroscopy, duodenoscopy with biopsies;  Surgeon: Marshall Mccormick MD;  Location:  OR    ESOPHAGOSCOPY, GASTROSCOPY, DUODENOSCOPY (EGD), COMBINED N/A 06/17/2021    Procedure: ESOPHAGOGASTRODUODENOSCOPY, WITH BIOPSY;  Surgeon: Darrel Damon MD;  Location:  GI    EXCISE MASS NECK Left 02/01/2023    Procedure: exploration of  NECK left;  Surgeon: Ibeth Conner MD;  Location:  OR    EXCISE MASS TRUNK Left 11/03/2021    Procedure:  EXCISION LEFT SHOULDER LIPOMA;  Surgeon: Ibeth Conner MD;  [facility]: [facility]    EXCISE NODE MEDIASTINAL  04/26/2013    Procedure: EXCISE NODE MEDIASTINAL;;  Surgeon: Av Peña MD;  [facility]: [facility]    LAPAROSCOPIC CHOLECYSTECTOMY N/A 10/19/2017    Procedure: LAPAROSCOPIC CHOLECYSTECTOMY;  LAPAROSCOPIC CHOLECYSTECTOMY;  Surgeon: Ney Jerry MD;  [facility]: [facility]    LARYNGOSCOPY, EXCISE VOCAL CORD LESION, COMBINED      THORACOSCOPY  04/26/2013    Procedure: THORACOSCOPY;  LEFT VIDEO ASSISTED THORACOSCOPY, RESECTION OF POSTERIOR MEDIASTINAL MASS;  Surgeon: Av Peña MD;  Location: [facility]    TONSILLECTOMY & ADENOIDECTOMY          Meds: reviewed  Current Outpatient Medications   Medication Sig Dispense Refill    ACCU-CHEK GUIDE test strip USE TO TEST BLOOD SUGAR ONCE A DAY OR AS DIRECTED 100 strip 1    acetaminophen (TYLENOL) 500 MG tablet Take 2 tablets (1,000 mg) by mouth every 6 hours as needed for pain      albuterol (PROAIR HFA/PROVENTIL HFA/VENTOLIN HFA) 108 (90 Base) MCG/ACT inhaler INHALE TWO PUFFS BY MOUTH EVERY 6 HOURS (Patient taking differently: 2 puffs every 6 hours as needed. INHALE TWO PUFFS BY MOUTH EVERY 6 HOURS) 6.7 g 0    albuterol (PROVENTIL) (2.5 MG/3ML) 0.083% neb solution INHALE ONE VIAL BY NEBULIZER EVERY 6 HOURS AS NEEDED FOR SHORTNESS OF BREATH OR WHEEZING Strength: (2.5 MG/3ML) 0.083% 75 mL 1    benzoyl peroxide (ACNE-CLEAR) 10 % external gel Apply topically 2 times daily as needed 90 g 1    blood glucose (NO BRAND SPECIFIED) test strip Use to test blood sugar 1 times daily or as directed.  Dispense Accu-chek Olinda Plus or per patient insurance 100 strip 3    blood glucose monitoring (SOFTCLIX) lancets USE TO TEST BLOOD SUGARS ONCE DAILY OR AS DIRECTED 100 each 0    budesonide-formoterol (SYMBICORT) 160-4.5 MCG/ACT Inhaler Inhale 2 puffs into the lungs 2 times daily. 30.6 g 1    buPROPion (WELLBUTRIN XL) 150 MG 24 hr tablet TAKE ONE TABLET BY MOUTH EVERY  MORNING 90 tablet 2    cetirizine (ZYRTEC) 10 MG tablet TAKE ONE TABLET BY MOUTH EVERY DAY AS NEEDED 90 tablet 1    clindamycin (CLINDAMAX) 1 % external gel Apply topically 2 times daily 60 g 3    clonazePAM (KLONOPIN) 1 MG tablet Take 1 tablet by mouth 2 times daily      clotrimazole (LOTRIMIN) 1 % external cream Apply topically 2 times daily 30 g 0    dimenhyDRINATE (DRAMAMINE) 50 MG tablet Take 1 tablet (50 mg) by mouth nightly as needed for sleep 14 tablet 0    Emollient (CERAVE MOISTURIZING) CREA Externally apply 1 Application topically 2 times daily 453 g 11    eszopiclone (LUNESTA) 2 MG tablet Take 2 mg by mouth at bedtime      furosemide (LASIX) 20 MG tablet TAKE 1 TABLET BY MOUTH TWICE DAILY 180 tablet 3    gabapentin (NEURONTIN) 100 MG capsule TAKE ONE CAPSULE BY MOUTH THREE TIMES A DAY 90 capsule 4    hydrocortisone 2.5 % cream APPLY TO AFFECTED AREA(S) TWO TIMES A DAY 30 g 1    hydrOXYzine HCl (ATARAX) 50 MG tablet TAKE TWO TABLETS BY MOUTH THREE TIMES A DAY AS NEEDED FOR ANXIETY 70 tablet 6    ibuprofen (ADVIL/MOTRIN) 200 MG tablet Take 3 tablets (600 mg) by mouth every 6 hours as needed for moderate pain (4-6)      lamoTRIgine (LAMICTAL) 200 MG tablet Take 1 tablet by mouth 2 times daily      levothyroxine (SYNTHROID/LEVOTHROID) 50 MCG tablet TAKE ONE TABLET BY MOUTH EVERY DAY 90 tablet 2    lisinopril (ZESTRIL) 10 MG tablet Take 2 tablets (20 mg) by mouth daily. Appointment needed for additional refills. Please call [phone] to schedule. 90 tablet 0    meloxicam (MOBIC) 15 MG tablet Take 15 mg by mouth daily with food      metFORMIN (GLUCOPHAGE) 500 MG tablet Take 1 tablet (500 mg) by mouth daily (with breakfast). 90 tablet 1    metroNIDAZOLE (METROGEL) 0.75 % external gel Apply topically 2 times daily 45 g 2    montelukast (SINGULAIR) 10 MG tablet TAKE ONE TABLET BY MOUTH AT BEDTIME 90 tablet 3    multivitamin, therapeutic (THERA-VIT) TABS tablet Take 1 tablet by mouth daily      nebivolol  (BYSTOLIC) 5 MG tablet Take 1 tablet (5 mg) by mouth daily 30 tablet 11    nitroGLYcerin (NITROSTAT) 0.4 MG sublingual tablet PLACE ONE TABLET UNDER THE TONGUE AT THE 1ST SIGN OF ATTACK. IF PAIN IS UNRELIEVED OR WORSENED 5 MINUTES AFTER 1ST DOSE, PROMPT MEDICAL ASSISTANCE IS NEEDED. MAY REPEAT EVERY 5 MINUTES UNTIL PAIN IS RELIEVED. MAX OF THREE DOSES 25 tablet 4    nystatin (NYAMYC) 285578 UNIT/GM external powder APPLY TO AFFECTED AREA(S) TWO TIMES A  g 0    omeprazole (PRILOSEC) 40 MG DR capsule TAKE ONE CAPSULE BY MOUTH TWICE A DAY FOR 1 MONTH THEN TAKE ONE CAPSULE EVERY DAY 60 capsule 2    ondansetron (ZOFRAN ODT) 4 MG ODT tab Take 1 tablet (4 mg) by mouth every 8 hours as needed for nausea 4 tablet 0    rosuvastatin (CRESTOR) 40 MG tablet Take 1 tablet (40 mg) by mouth daily. 90 tablet 2    Semaglutide (RYBELSUS) 14 MG tablet TAKE ONE TABLET BY MOUTH EVERY DAY 90 tablet 0    sucralfate (CARAFATE) 1 GM tablet TAKE ONE TABLET BY MOUTH FOUR TIMES A DAY AS NEEDED FOR NAUSEA 120 tablet 0    terbinafine (LAMISIL) 1 % external cream APPLY TO AFFECTED AREA(S) TWO TIMES A DAY 30 g 2    triamcinolone (KENALOG) 0.025 % external ointment Apply topically 2 times daily 80 g 1    Vitamin D3 (CHOLECALCIFEROL) 125 MCG (5000 UT) tablet Take 1 tablet by mouth three times a week      VRAYLAR 6 MG capsule Take 6 mg by mouth daily         Soc Hx: reviewed  Fam Hx: reviewed    42 minutes spent on the date of the encounter doing   chart review,   review of outside records,   review of test results,   interpretation of tests,   patient visit,   documentation,         Chart documentation was completed, in part, with [employer] voice-recognition software. Even though reviewed, some grammatical, spelling, and word errors may remain.      Roro Low MD  Internal Medicine

## 2024-12-26 ENCOUNTER — TELEPHONE (OUTPATIENT)
Dept: INTERNAL MEDICINE | Facility: CLINIC | Age: 54
End: 2024-12-26
Payer: COMMERCIAL

## 2024-12-26 DIAGNOSIS — R26.9 ABNORMAL GAIT: Primary | ICD-10-CM

## 2024-12-26 DIAGNOSIS — R42 DIZZINESS: ICD-10-CM

## 2024-12-26 DIAGNOSIS — R29.898 LEG WEAKNESS, BILATERAL: ICD-10-CM

## 2024-12-26 NOTE — TELEPHONE ENCOUNTER
Patient calls in and states that she needs general anesthesia for the MRI of her brain. The referral does state that the patient is not a candidate for oral sedation but it needs to specify that she would need general sedation and it is okay for her to receive this.    Please update or write new referral including this information if appropriate

## 2024-12-27 ENCOUNTER — TELEPHONE (OUTPATIENT)
Dept: INTERNAL MEDICINE | Facility: CLINIC | Age: 54
End: 2024-12-27
Payer: COMMERCIAL

## 2024-12-27 NOTE — TELEPHONE ENCOUNTER
Forms/Letter Request    Type of form/letter: DME (wheelchair, hospital bed)    Type of DME requested: Lift Mechanism     Do we have the form/letter: Yes: placed in provider mailbox for signature    Who is the form from? [facility] # [id number] (if other please explain)    Where did/will the form come from? form was faxed in    When is form/letter needed by: 5-7    How would you like the form/letter returned: Fax : [phone]    Patient Notified form requests are processed in 5-7 business days:Yes    Could we send this information to you in [facility] or would you prefer to receive a phone call?:   NA

## 2024-12-28 DIAGNOSIS — R10.13 EPIGASTRIC PAIN: ICD-10-CM

## 2024-12-28 NOTE — TELEPHONE ENCOUNTER
MRI with general anesthesia ordered    The records indicate the patient had general anesthesia on the MRI August 2024

## 2024-12-30 ENCOUNTER — MEDICAL CORRESPONDENCE (OUTPATIENT)
Dept: HEALTH INFORMATION MANAGEMENT | Facility: CLINIC | Age: 54
End: 2024-12-30

## 2024-12-30 RX ORDER — SUCRALFATE 1 G/1
TABLET ORAL
Qty: 120 TABLET | Refills: 0 | Status: SHIPPED | OUTPATIENT
Start: 2024-12-30

## 2024-12-30 NOTE — TELEPHONE ENCOUNTER
Spoke with patient and that the MRI order is update with general anesthesia . Patient said she will call back radiology to schedule her MRI.

## 2025-01-03 ENCOUNTER — TELEPHONE (OUTPATIENT)
Dept: INTERNAL MEDICINE | Facility: CLINIC | Age: 55
End: 2025-01-03
Payer: COMMERCIAL

## 2025-01-03 DIAGNOSIS — H10.33 ACUTE CONJUNCTIVITIS OF BOTH EYES, UNSPECIFIED ACUTE CONJUNCTIVITIS TYPE: Primary | ICD-10-CM

## 2025-01-03 NOTE — TELEPHONE ENCOUNTER
Patient is calling because the eye drops she got at her 12/24/24 appointment are not working. She cannot see the eye doctor for another week and she is hoping to get a different eye drop in the mean time.

## 2025-01-07 ENCOUNTER — TELEPHONE (OUTPATIENT)
Dept: INTERNAL MEDICINE | Facility: CLINIC | Age: 55
End: 2025-01-07
Payer: COMMERCIAL

## 2025-01-07 NOTE — TELEPHONE ENCOUNTER
Next 5 appointments (look out 90 days)      Feb 11, 2025 9:00 AM  (Arrive by 8:40 AM)  Annual Wellness Visit with Roro Chawla MD  [facility] ([facility] [address]  [address]  [facility] [id number] [phone]          Does patient need any labs prior to appointment?

## 2025-01-07 NOTE — TELEPHONE ENCOUNTER
General Call    Contacts       Contact Date/Time Type Contact Phone/Fax    01/07/2025 01:01 PM CST Phone (Incoming) Swetha Patterson (Self) [phone] (M)          Reason for Call:  are labs needed?     What are your questions or concerns:  are labs needed?     Date of last appointment with provider: n/a    Could we send this information to you in [facility] or would you prefer to receive a phone call?:   Patient would prefer a phone call   Okay to leave a detailed message?: Yes at Cell number on file:    Telephone Information:   Mobile [phone]

## 2025-01-07 NOTE — PROGRESS NOTES
Medication Therapy Management (MTM) Encounter    ASSESSMENT:                            Medication Adherence/Access: No issues identified.    Smoking cessation:  Progressing, wants to continue working on this on her own.     Type 2 Diabetes: A1c well within goal <7%. Due for repeat A1c, placed order today.  Sent updated prescription for new glucometer due to conflicting sugar readings minutes apart.  Will see if PCP is okay switching from Rybelsus to Ozempic for blood sugar control and weight loss benefit.    Hypertension/Lower Extremity Edema/PVCs/Chest Pain:  blood pressure at goal <140/90 mmHg.      Hyperlipidemia: Stable. LDL within goal.      Asthma/COPD/Allergies: Recommend seeing if Trelelgy is covered and increasing from Symbicort to Trelelgy. Breathing is poor and she attempted to change to Trelegy in the past but wasn't affordable.  Sent refills of albuterol neb today.  Recommended to stop cetirizine due to taking hydroxyzine schedule through out the day. Will instead try Flonase to help allergy symptoms.  Encouraged smoking cessation and weight loss to help with breathing as well.    GERD/Delayed Gastric Emptying: Stable.      Bipolar Affective Disorder/Anxiety: Discussed many of her medications for bipolar and anxiety can cause the symptoms she is describing. I am more inclined to believe olanzapine may be the culprit since side effects either started or worsened when this medication was started. Did discuss though that it could be the combination of taking all of these together. Encouraged patient to discuss coming off of olanzapine at psych visit tomorrow.     Supplements: Stable.      Hydradenitis Suppurativa/Eczema: Defer to dermatology.     Hypothyroidism:   Recommend repeat TSH labs due to recent concerns with hair loss.    Pain: Discussed gabapentin could possibly be contributing to her symptoms, but she is on quite a low dose so I am less inclined to think this is the cause.    PLAN:                             Let's try switching from Symicort inhaler to Trelelgy 1 puff once a day instead to see if that helps your breathing.  Try Flonase for your allergies instead of cetirizine. Stop both cetirizine and dramamine at bedtime, since these overlap with your hydroxyzine medication and could lead to side effects.  I will reach out to Dr. Low to see if she is okay with switching from Rybelsus to Ozempic to help with blood sugar control and weight loss.   I sent a new glucometer supply to your pharmacy.  Get your fasting labs done when convenient for you. I added on an A1c and TSH to these as well.  Talk with psychiatrist about maybe stopping olanzapine, this could be contributing to recent weight gain, dizziness, increased appetite, etc. When you do stop it it will take 1-2 weeks to completely leave your system.    Follow-up: Return in about 8 weeks (around 3/6/2025) for Medication Therapy Management, In-Clinic Visit at 11AM.    SUBJECTIVE/OBJECTIVE:                          Swetha Patterson is a 54 year old female seen for an initial visit. She was referred to me from [facility]. Patient was accompanied by Elidia S worker.     Reason for visit: med review.    Allergies/ADRs: Reviewed in chart  Past Medical History: Reviewed in chart  Tobacco: She reports that she has been smoking cigarettes. She has a 9 pack-year smoking history. She has been exposed to tobacco smoke. She has never used smokeless tobacco.Nicotine/Tobacco Cessation Plan  Information offered: Patient not interested at this time  She would like to try quitting on her own.  Alcohol: not currently using    Medication Adherence/Access: Patient uses pill box(es). Set up by a nurse once a week.  Per patient, misses medication 0 time(s) per week.   Medication barriers: none.     Diabetes   Metformin 500 mg daily  Rybelsus 14 mg daily  Patient is not experiencing side effects. Reports more concerns with constipation than anything. Reports  that she has gained more weight recently. Would be interested in Ozempic, but said PCP declined.  Blood sugar monitorin-2 time(s) daily. Ranges (patient reported): Reports it was 141 mg/dL fasting. Reports minutes prior it was in the 300's, then meter read 140's, but her meter is old. Requesting a new meter today.     Eye exam is up to date  Foot exam is up to date  Lab Results   Component Value Date    A1C 5.7 07/10/2024    A1C 5.8 2024    A1C 5.5 2022    A1C 5.8 2022       Hypertension   Hypertension/Lower Extremity Edema/PVCs/Chest Pain:   Amlodipine 2.5 mg daily  Lisinopril 20 mg daily   Furosemide 20 mg twice daily   Nebivolol 5 mg daily  Nitroglycerin SL as needed for chest pain - last needed about 2-3 weeks ago, often will need two to control chest pain  Reports swelling has been okay, does have swelling by the end of the day. Is working on elevating her feet more.  Nurse checks blood pressure once a week 136-140/90's most recently.  Reports is looking for a new cardiologist, doesn't like her current cardiologist.     BP Readings from Last 3 Encounters:   24 133/75   24 (!) 165/90   24 112/75     Hyperlipidemia   Rosuvastatin 40 mg daily  Patient reports no significant myalgias or other side effects.       Recent Labs   Lab Test 24  1039 22  0929   CHOL 221* 161   HDL 47* 46*   * 72   TRIG 228* 423*     Asthma   Asthma/COPD/Allergies:   ICS/LAMA/LABA- Symbicort 2 puffs twice daily  Albuterol inhaler and nebs - reports she usually uses 1-2 times a day, reports gets out of breath when running around  Montelukast 10 mg daily   Cetirizine 10 mg daily   Reports she does have difficult with breathing. Feels short of breath often. Reports a mix of smoking and weight gain she thinks.  Patient rinses their mouth after using steroid inhaler.   Patient is experiencing the following side effects: none.  Reports no sputum production.        GERD    GERD/Delayed  Gastric Emptying:   Omeprazole 40 mg daily  Ondansetron ODT 4 mg as needed - not needing often, really only if she takes meds and doesn't eat something  Sucralfate 1 g four times daily as needed - does take it scheduled  Hiatal hernia. Pt reports no current symptoms.  Patient feels that current regimen is effective.        Mental Health   Bipolar Affective Disorder/Anxiety/Insomnia   Lamotrigine 200 mg twice daily   Bupropion  mg daily   Hydroxyzine  mg three times daily as needed for anxiety - is taking three times daily   Clonazepam 1 mg twice daily as needed   Lunesta 2 mg at bedtime  Olanzapine 10 mg at bedtime - started mid-Nov    Reports dizziness, falls, stuttering, memory loss, getting up in the middle of the night to eat. Olanzapine was the newest added on medication. Can't exaclty remember when these symptoms started, if it was before or after olanzapine being added on. Does think it has been worse the past couple months as she has noticed these issue more.    Reports she gets really bad anxiety. Needs both hydroxyzine and clonazepam throughout the day scheduled to control anxiety.  Reports she does have some manic days and will get very anxious.  See's a psychiatrist who is managing these. Has follow up visit tomorrow.  Reports her sleep has been so-so. Reports taking Lunesta, clonazepam, and hydroxyzine helps her sleep. Reports her sleep depends on her anxiety.       Hypothyroidism   Levothyroxine 50 mcg daily.   Patient is having the following symptoms: hair loss, weight gain.      TSH   Date Value Ref Range Status   04/09/2024 1.77 0.30 - 4.20 uIU/mL Final   08/17/2022 6.29 (H) 0.40 - 4.00 mU/L Final   05/20/2021 1.48 0.40 - 4.00 mU/L Final       Supplements   Vitamin D 5000 three times weekly   Multivitamin 1 tablet daily   No concerns. History of low vitamin D          Hydradenitis Suppurativa/Eczema:   Triamcinolone twice daily as needed   Nystatin powder daily   Metrogel 0.75% twice  daily   Hydrocortisone 2.5% cream as needed  Reports that the antibiotic has been somewhat helpful. Finds that nystatin powder helpful for skin folds, especially when she sweats more. Using terbinafine and clindamycin for HS symptoms on the abdomen.     Pain:  Gabapentin 100 mg three times daily   Mobic 15 mg daily with food  Acetaminophen 1000 mg as needed - not needing much since starting gabapentin.  Ibuprofen as needed - not needed in a long time  Source of pain is fibromyalgia. Reports gabapentin helps her legs a lot.      Today's Vitals: Wt 226 lb 8 oz (102.7 kg)   LMP 01/01/2014 (Approximate)   BMI 41.43 kg/m    ----------------      I spent 70 minutes with this patient today. All changes were made via collaborative practice agreement with Roro Chawla MD.     A summary of these recommendations was given to the patient.    Vicky Robbins, PharmD  Medication Therapy Management Pharmacist  Voicemail: [phone]           Medication Therapy Recommendations  Bipolar disorder (H)   1 Current Medication: OLANZapine (ZYPREXA) 5 MG tablet   Current Medication Sig: Take 10 mg by mouth at bedtime.   Rationale: Undesirable effect - Adverse medication event - Safety   Recommendation: Discontinue Medication   Status: Contact Provider - Awaiting Response   Identified Date: 1/8/2025         COPD, moderate (H)   1 Current Medication: budesonide-formoterol (SYMBICORT) 160-4.5 MCG/ACT Inhaler (Discontinued)   Current Medication Sig: Inhale 2 puffs into the lungs 2 times daily.   Rationale: More effective medication available - Ineffective medication - Effectiveness   Recommendation: Change Medication   Status: Accepted per CPA   Identified Date: 1/8/2025 Completed Date: 1/8/2025         Seasonal allergic rhinitis, unspecified trigger   1 Current Medication: cetirizine (ZYRTEC) 10 MG tablet   Current Medication Sig: TAKE ONE TABLET BY MOUTH EVERY DAY AS NEEDED   Rationale: Medication interaction -  Adverse medication event - Safety   Recommendation: Change Medication   Status: Accepted per CPA   Identified Date: 1/8/2025 Completed Date: 1/8/2025         Type 2 diabetes mellitus without complication, without long-term current use of insulin (H)   1 Current Medication: Semaglutide (RYBELSUS) 14 MG tablet   Current Medication Sig: TAKE ONE TABLET BY MOUTH EVERY DAY   Rationale: More effective medication available - Ineffective medication - Effectiveness   Recommendation: Change Medication   Status: Contact Provider - Awaiting Response   Identified Date: 1/8/2025

## 2025-01-08 ENCOUNTER — OFFICE VISIT (OUTPATIENT)
Dept: PHARMACY | Facility: CLINIC | Age: 55
End: 2025-01-08
Payer: COMMERCIAL

## 2025-01-08 VITALS — BODY MASS INDEX: 41.43 KG/M2 | WEIGHT: 226.5 LBS

## 2025-01-08 DIAGNOSIS — J44.9 CHRONIC OBSTRUCTIVE PULMONARY DISEASE, UNSPECIFIED COPD TYPE (H): Primary | ICD-10-CM

## 2025-01-08 DIAGNOSIS — I49.3 PVC'S (PREMATURE VENTRICULAR CONTRACTIONS): ICD-10-CM

## 2025-01-08 DIAGNOSIS — Z78.9 TAKES DIETARY SUPPLEMENTS: ICD-10-CM

## 2025-01-08 DIAGNOSIS — J30.2 SEASONAL ALLERGIC RHINITIS, UNSPECIFIED TRIGGER: ICD-10-CM

## 2025-01-08 DIAGNOSIS — Z71.6 ENCOUNTER FOR SMOKING CESSATION COUNSELING: ICD-10-CM

## 2025-01-08 DIAGNOSIS — F41.9 ANXIETY: ICD-10-CM

## 2025-01-08 DIAGNOSIS — R52 PAIN: ICD-10-CM

## 2025-01-08 DIAGNOSIS — E03.9 HYPOTHYROIDISM, UNSPECIFIED TYPE: ICD-10-CM

## 2025-01-08 DIAGNOSIS — K21.00 GASTROESOPHAGEAL REFLUX DISEASE WITH ESOPHAGITIS, UNSPECIFIED WHETHER HEMORRHAGE: ICD-10-CM

## 2025-01-08 DIAGNOSIS — R60.9 EDEMA, UNSPECIFIED TYPE: ICD-10-CM

## 2025-01-08 DIAGNOSIS — L30.9 ECZEMA, UNSPECIFIED TYPE: ICD-10-CM

## 2025-01-08 DIAGNOSIS — J44.9 COPD, MODERATE (H): ICD-10-CM

## 2025-01-08 DIAGNOSIS — E78.00 PURE HYPERCHOLESTEROLEMIA: ICD-10-CM

## 2025-01-08 DIAGNOSIS — I10 BENIGN ESSENTIAL HYPERTENSION: ICD-10-CM

## 2025-01-08 DIAGNOSIS — E11.9 TYPE 2 DIABETES MELLITUS WITHOUT COMPLICATION, WITHOUT LONG-TERM CURRENT USE OF INSULIN (H): ICD-10-CM

## 2025-01-08 DIAGNOSIS — F31.60 BIPOLAR AFFECTIVE DISORDER, CURRENT EPISODE MIXED, CURRENT EPISODE SEVERITY UNSPECIFIED (H): ICD-10-CM

## 2025-01-08 PROCEDURE — 99607 MTMS BY PHARM ADDL 15 MIN: CPT | Performed by: PHARMACIST

## 2025-01-08 PROCEDURE — 99605 MTMS BY PHARM NP 15 MIN: CPT | Performed by: PHARMACIST

## 2025-01-08 RX ORDER — OLANZAPINE 5 MG/1
10 TABLET ORAL AT BEDTIME
COMMUNITY

## 2025-01-08 RX ORDER — ERYTHROMYCIN 5 MG/G
0.5 OINTMENT OPHTHALMIC 2 TIMES DAILY
Qty: 3.5 G | Refills: 0 | Status: SHIPPED | OUTPATIENT
Start: 2025-01-08

## 2025-01-08 RX ORDER — FLUTICASONE FUROATE, UMECLIDINIUM BROMIDE AND VILANTEROL TRIFENATATE 100; 62.5; 25 UG/1; UG/1; UG/1
1 POWDER RESPIRATORY (INHALATION) DAILY
Qty: 3 EACH | Refills: 3 | Status: SHIPPED | OUTPATIENT
Start: 2025-01-08

## 2025-01-08 RX ORDER — ALBUTEROL SULFATE 0.83 MG/ML
SOLUTION RESPIRATORY (INHALATION)
Qty: 75 ML | Refills: 1 | Status: SHIPPED | OUTPATIENT
Start: 2025-01-08

## 2025-01-08 RX ORDER — FLUTICASONE PROPIONATE 50 MCG
1 SPRAY, SUSPENSION (ML) NASAL DAILY
Qty: 16 G | Refills: 5 | Status: SHIPPED | OUTPATIENT
Start: 2025-01-08

## 2025-01-08 NOTE — LETTER
_  Medication List        Prepared on: Jan 8, 2025     Bring your Medication List when you go to the doctor, hospital, or   emergency room. And, share it with your family or caregivers.     Note any changes to how you take your medications.  Cross out medications when you no longer use them.    Medication How I take it Why I use it Prescriber   ACCU-CHEK GUIDE test strip USE TO TEST BLOOD SUGAR ONCE A DAY OR AS DIRECTED Type 2 diabetes mellitus without complication, without long-term current use of insulin (H) Roro Chawla MD   acetaminophen (TYLENOL) 500 MG tablet Take 2 tablets (1,000 mg) by mouth every 6 hours as needed for pain Chronic Left Shoulder Pain Ibeth Conner MD   albuterol (PROAIR HFA/PROVENTIL HFA/VENTOLIN HFA) 108 (90 Base) MCG/ACT inhaler INHALE TWO PUFFS BY MOUTH EVERY 6 HOURS COPD, moderate (H) Roro Chawla MD   albuterol (PROVENTIL) (2.5 MG/3ML) 0.083% neb solution INHALE ONE VIAL BY NEBULIZER EVERY 6 HOURS AS NEEDED FOR SHORTNESS OF BREATH OR WHEEZING Strength: (2.5 MG/3ML) 0.083% COPD, moderate (H) Roro Chawla MD   benzoyl peroxide (ACNE-CLEAR) 10 % external gel Apply topically 2 times daily as needed Hidradenitis Suppurativa Ashley Antonio PA-C   blood glucose (NO BRAND SPECIFIED) lancets standard Use to test blood sugar 2 times daily or as directed. Type 2 diabetes mellitus without complication, without long-term current use of insulin (H) Roro Chawla MD   blood glucose (NO BRAND SPECIFIED) test strip Use to test blood sugar 1 times daily or as directed.  Dispense Accu-chek Olinda Plus or per patient insurance Blood Glucose Elevated Roro Chawla MD   blood glucose (NO BRAND SPECIFIED) test strip Use to test blood sugar 2 times daily or as directed. Type 2 diabetes mellitus without complication, without long-term current use of insulin (H) Roro Chawla MD   blood glucose  monitoring (NO BRAND SPECIFIED) meter device kit Use to test blood sugar 2 times daily or as directed. Type 2 diabetes mellitus without complication, without long-term current use of insulin (H) Roro Chawla MD   blood glucose monitoring (SOFTCLIX) lancets USE TO TEST BLOOD SUGARS ONCE DAILY OR AS DIRECTED Type 2 diabetes mellitus without complication, without long-term current use of insulin (H) Roro Chawla MD   buPROPion (WELLBUTRIN XL) 150 MG 24 hr tablet TAKE ONE TABLET BY MOUTH EVERY MORNING Encounter for smoking cessation counseling Roro Chawla MD   cetirizine (ZYRTEC) 10 MG tablet TAKE ONE TABLET BY MOUTH EVERY DAY AS NEEDED COPD, moderate (H) Roro Chawla MD   clindamycin (CLINDAMAX) 1 % external gel Apply topically 2 times daily Hidradenitis Suppurativa Ashley Antonio PA-C   clonazePAM (KLONOPIN) 1 MG tablet Take 1 tablet by mouth 2 times daily  Anxiety Roro Chawla MD   clotrimazole (LOTRIMIN) 1 % external cream Apply topically 2 times daily Mucocutaneous Candidiasis Maude Pat MD   eszopiclone (LUNESTA) 2 MG tablet Take 2 mg by mouth at bedtime  Insomnia Roro Chawla MD   fluticasone (FLONASE) 50 MCG/ACT nasal spray Spray 1 spray into both nostrils daily. COPD, moderate (H) Roro Chawla MD   Fluticasone-Umeclidin-Vilant (TRELEGY ELLIPTA) 100-62.5-25 MCG/ACT oral inhaler Inhale 1 puff into the lungs daily. COPD, moderate (H) Roro Chawla MD   furosemide (LASIX) 20 MG tablet TAKE 1 TABLET BY MOUTH TWICE DAILY Bilateral Leg Edema Roro Chawla MD   gabapentin (NEURONTIN) 100 MG capsule TAKE ONE CAPSULE BY MOUTH THREE TIMES A DAY Fibromyalgia Roro Chawla MD   hydrocortisone 2.5 % cream Apply topically 2 times daily. Eczema of Face Roro Chawla MD   hydrOXYzine HCl (ATARAX) 50 MG tablet TAKE TWO TABLETS BY  MOUTH THREE TIMES A DAY AS NEEDED FOR ANXIETY Anxiety Roro Chawla MD   ibuprofen (ADVIL/MOTRIN) 200 MG tablet Take 3 tablets (600 mg) by mouth every 6 hours as needed for moderate pain (4-6) Chronic Left Shoulder Pain Ibeth Conner MD   lamoTRIgine (LAMICTAL) 200 MG tablet Take 1 tablet by mouth 2 times daily  Bipolar Roro Chawla MD   levothyroxine (SYNTHROID/LEVOTHROID) 50 MCG tablet TAKE ONE TABLET BY MOUTH EVERY DAY Hypothyroidism, unspecified type Roro Chawla MD   lisinopril (ZESTRIL) 10 MG tablet Take 2 tablets (20 mg) by mouth daily. Appointment needed for additional refills. Please call [phone] to schedule. HTN, goal below 140/90 LEIGH Hussein CNP   metFORMIN (GLUCOPHAGE) 500 MG tablet Take 1 tablet (500 mg) by mouth daily (with breakfast). Prediabetes Roro Chawla MD   montelukast (SINGULAIR) 10 MG tablet TAKE ONE TABLET BY MOUTH AT BEDTIME Dust allergy; COPD, moderate (H) Roro Chawla MD   multivitamin, therapeutic (THERA-VIT) TABS tablet Take 1 tablet by mouth daily  General Health Patient Reported   nebivolol (BYSTOLIC) 5 MG tablet Take 1 tablet (5 mg) by mouth daily PVC's (Premature Ventricular Contractions) Kassidy Tate DO   nitroGLYcerin (NITROSTAT) 0.4 MG sublingual tablet PLACE ONE TABLET UNDER THE TONGUE AT THE 1ST SIGN OF ATTACK. IF PAIN IS UNRELIEVED OR WORSENED 5 MINUTES AFTER 1ST DOSE, PROMPT MEDICAL ASSISTANCE IS NEEDED. MAY REPEAT EVERY 5 MINUTES UNTIL PAIN IS RELIEVED. MAX OF THREE DOSES Chest pain, unspecified type Lesley Morales PA-C   nystatin (NYAMYC) 600553 UNIT/GM external powder APPLY TO AFFECTED AREA(S) TWO TIMES A DAY Rash Roro Chawla MD   OLANZapine (ZYPREXA) 5 MG tablet Take 10 mg by mouth at bedtime.  Bipolar Roro Chawla MD   omeprazole (PRILOSEC) 40 MG DR capsule TAKE ONE CAPSULE BY MOUTH TWICE A DAY FOR 1 MONTH THEN  TAKE ONE CAPSULE EVERY DAY Gastroesophageal Reflux Disease without Esophagitis Roro Chawla MD   ondansetron (ZOFRAN ODT) 4 MG ODT tab Take 1 tablet (4 mg) by mouth every 8 hours as needed for nausea Right ureteral calculus Sammy Sony Herndon MD   rosuvastatin (CRESTOR) 40 MG tablet Take 1 tablet (40 mg) by mouth daily. Hyperlipidemia LDL Goal <130 Roro Chawla MD   Semaglutide (RYBELSUS) 14 MG tablet TAKE ONE TABLET BY MOUTH EVERY DAY Type 2 diabetes mellitus without complication, without long-term current use of insulin (H) Roro Chawla MD   sucralfate (CARAFATE) 1 GM tablet TAKE ONE TABLET BY MOUTH FOUR TIMES A DAY AS NEEDED FOR NAUSEA Epigastric Pain Roro Chawla MD   triamcinolone (KENALOG) 0.1 % external cream Apply topically 2 times daily. Eczema of both hands Roro Chawla MD   Vitamin D3 (CHOLECALCIFEROL) 125 MCG (5000 UT) tablet Take 1 tablet by mouth three times a week  General Health Patient Reported         Add new medications, over-the-counter drugs, herbals, vitamins, or  minerals in the blank rows below.    Medication How I take it Why I use it Prescriber                                      Allergies:      - Codeine - Nausea and Vomiting  - Cyclobenzaprine - Nausea and Vomiting  - Gabapentin - Rash  - Hydrocodone - Nausea and Vomiting  - Sulfa Antibiotics - Nausea and Vomiting        Side effects I have had:      Not on File        Other Information:              My notes and questions:

## 2025-01-08 NOTE — LETTER
January 8, 2025  Swetha MALVIN Leonardo  [address] [facility] [address]    Dear Ms. Patterson, [facility]     Thank you for talking with me on Jan 8, 2025 about your health and medications. As a follow-up to our conversation, I have included two documents:      Your Recommended To-Do List has steps you should take to get the best results from your medications.  Your Medication List will help you keep track of your medications and how to take them.    If you want to talk about these documents, please call Vicky Robbins RPH at phone: [phone], Monday-Friday 8-4:30pm.    I look forward to working with you and your doctors to make sure your medications work well for you.    Sincerely,  Vicky Robbins RPH  [facility] Pharmacist, [facility]

## 2025-01-08 NOTE — LETTER
"Recommended To-Do List      Prepared on: Jan 8, 2025       You can get the best results from your medications by completing the items on this \"To-Do List.\"      Bring your To-Do List when you go to your doctor. And, share it with your family or caregivers.    My To-Do List:  What we talked about: What I should do:   An issue with your medication    Talk with psychiatrist about maybe stopping olanzapine, this could be contributing to recent weight gain, dizziness, increased appetite, etc. When you do stop it it will take 1-2 weeks to completely leave your system.          What we talked about: What I should do:   A medication that is not working    I will reach out to Dr. Low to see if she is okay with switching from Rybelsus to Ozempic to help with blood sugar control and weight loss.          What we talked about: What I should do:   A medication that is not working    Let's try switching from Symicort inhaler to Trelelgy 1 puff once a day instead to see if that helps your breathing.          What we talked about: What I should do:   An issue with your medication    Try Flonase for your allergies instead of cetirizine. Stop both cetirizine and dramamine at bedtime, since these overlap with your hydroxyzine medication and could lead to side effects.          What we talked about: What I should do:                     "

## 2025-01-08 NOTE — PATIENT INSTRUCTIONS
"Recommendations from today's MTM visit:                                                    MTM (medication therapy management) is a service provided by a clinical pharmacist designed to help you get the most of out of your medicines.   Today we reviewed what your medicines are for, how to know if they are working, that your medicines are safe and how to make your medicine regimen as easy as possible.      Let's try switching from Symicort inhaler to Trelelgy 1 puff once a day instead to see if that helps your breathing.  Try Flonase for your allergies instead of cetirizine. Stop both cetirizine and dramamine at bedtime, since these overlap with your hydroxyzine medication and could lead to side effects.  I will reach out to Dr. Low to see if she is okay with switching from Rybelsus to Ozempic to help with blood sugar control and weight loss.   I sent a new glucometer supply to your pharmacy.  Get your fasting labs done when convenient for you. I added on an A1c and TSH to these as well.  Talk with psychiatrist about maybe stopping olanzapine, this could be contributing to recent weight gain, dizziness, increased appetite, etc. When you do stop it it will take 1-2 weeks to completely leave your system.    Follow-up: Return in about 8 weeks (around 3/6/2025) for Medication Therapy Management, In-Clinic Visit at 11AM.    It was great speaking with you today.  I value your experience and would be very thankful for your time in providing feedback in our clinic survey. In the next few days, you may receive an email or text message from [employer] with a link to a survey related to your  clinical pharmacist.\"     To schedule another MTM appointment, please call the clinic directly or you may call the MTM scheduling line at [phone] or toll-free at [phone].     My Clinical Pharmacist's contact information:                                                      Please feel free to contact me with any questions " or concerns you have.      Vicky Robbins, PharmD  Medication Therapy Management Pharmacist  Voicemail: [phone]

## 2025-01-08 NOTE — Clinical Note
Hello,  I saw this patient for med review. During visit she expressed concern with recent weight gain. I would like to switch her from Rybelsus to Ozempic since Rybelsus has minimal to no weight loss benefit. Any concerns with this?   Thanks!  Vicky Robbins, PharmD Medication Therapy Management Pharmacist Voicemail: [phone]

## 2025-01-09 ENCOUNTER — TELEPHONE (OUTPATIENT)
Dept: INTERNAL MEDICINE | Facility: CLINIC | Age: 55
End: 2025-01-09
Payer: COMMERCIAL

## 2025-01-09 NOTE — TELEPHONE ENCOUNTER
Forms/Letter Request    Type of form/letter: Medical opinion      Do we have the form/letter: Yes: placed in provider mailbox for signature    Who is the form from? [facility] #[id number](if other please explain)    Where did/will the form come from? form was faxed in    When is form/letter needed by: 5-7    How would you like the form/letter returned: Fax : [phone]    Patient Notified form requests are processed in 5-7 business days:Yes    Could we send this information to you in [employer] or would you prefer to receive a phone call?:   NA

## 2025-01-12 DIAGNOSIS — I10 HTN, GOAL BELOW 140/90: ICD-10-CM

## 2025-01-13 RX ORDER — LISINOPRIL 10 MG/1
20 TABLET ORAL DAILY
Qty: 180 TABLET | Refills: 1 | Status: SHIPPED | OUTPATIENT
Start: 2025-01-13

## 2025-01-14 ENCOUNTER — TELEPHONE (OUTPATIENT)
Dept: INTERNAL MEDICINE | Facility: CLINIC | Age: 55
End: 2025-01-14
Payer: COMMERCIAL

## 2025-01-14 NOTE — TELEPHONE ENCOUNTER
Patient states she met with MTM, pharmacist has been waiting for provider response if PCP is okay switching from Rybelsus to Ozempic for blood sugar control and weight loss benefit.  Patient calling for status update of this request.  Lisa Sebastian,

## 2025-01-15 NOTE — TELEPHONE ENCOUNTER
Please see DOMENICA Perry Pharmacist, progress note dated 1/8/2025.    She is recommending switching from Rybelsus to Ozempic for blood sugar control and weight loss.        Patient calling for update on this because she would like to go over this with her Home Health Nurse when she comes out

## 2025-01-15 NOTE — TELEPHONE ENCOUNTER
Swetha calls back. She states that Dr Low needs to fax in the Ozempic for her.   Provider approval needed.       On 1/8/25. Per Vicky MTM:  Type 2 Diabetes: A1c well within goal <7%. Due for repeat A1c, placed order today.  Sent updated prescription for new glucometer due to conflicting sugar readings minutes apart.  Will see if PCP is okay switching from Rybelsus to Ozempic for blood sugar control and weight loss benefit.

## 2025-01-16 ENCOUNTER — LAB (OUTPATIENT)
Dept: LAB | Facility: CLINIC | Age: 55
End: 2025-01-16
Payer: COMMERCIAL

## 2025-01-16 ENCOUNTER — TELEPHONE (OUTPATIENT)
Dept: INTERNAL MEDICINE | Facility: CLINIC | Age: 55
End: 2025-01-16

## 2025-01-16 DIAGNOSIS — R47.89 OTHER SPEECH DISTURBANCE: ICD-10-CM

## 2025-01-16 DIAGNOSIS — E11.9 TYPE 2 DIABETES MELLITUS WITHOUT COMPLICATION, WITHOUT LONG-TERM CURRENT USE OF INSULIN (H): ICD-10-CM

## 2025-01-16 DIAGNOSIS — E03.9 HYPOTHYROIDISM, UNSPECIFIED TYPE: ICD-10-CM

## 2025-01-16 LAB
EST. AVERAGE GLUCOSE BLD GHB EST-MCNC: 117 MG/DL
FOLATE SERPL-MCNC: 11.9 NG/ML (ref 4.6–34.8)
HBA1C MFR BLD: 5.7 % (ref 0–5.6)

## 2025-01-19 LAB
PYRIDOXAL PHOS SERPL-SCNC: 16.3 NMOL/L
VIT B1 PYROPHOSHATE BLD-SCNC: 158 NMOL/L

## 2025-01-21 ENCOUNTER — MYC MEDICAL ADVICE (OUTPATIENT)
Dept: INTERNAL MEDICINE | Facility: CLINIC | Age: 55
End: 2025-01-21
Payer: COMMERCIAL

## 2025-01-21 DIAGNOSIS — E53.1 VITAMIN B6 DEFICIENCY: Primary | ICD-10-CM

## 2025-01-21 DIAGNOSIS — R10.13 EPIGASTRIC PAIN: ICD-10-CM

## 2025-01-21 DIAGNOSIS — E11.9 TYPE 2 DIABETES MELLITUS WITHOUT COMPLICATION, WITHOUT LONG-TERM CURRENT USE OF INSULIN (H): ICD-10-CM

## 2025-01-21 RX ORDER — ORAL SEMAGLUTIDE 14 MG/1
TABLET ORAL DAILY
Qty: 90 TABLET | Refills: 0 | Status: SHIPPED | OUTPATIENT
Start: 2025-01-21 | End: 2025-01-21

## 2025-01-21 RX ORDER — MULTIVITAMIN WITH IRON
100 TABLET ORAL DAILY
Qty: 90 TABLET | Refills: 4 | Status: SHIPPED | OUTPATIENT
Start: 2025-01-21

## 2025-01-21 RX ORDER — SUCRALFATE 1 G/1
TABLET ORAL
Qty: 120 TABLET | Refills: 0 | Status: SHIPPED | OUTPATIENT
Start: 2025-01-21

## 2025-01-23 DIAGNOSIS — I49.3 PVC'S (PREMATURE VENTRICULAR CONTRACTIONS): ICD-10-CM

## 2025-01-23 RX ORDER — NEBIVOLOL 5 MG/1
5 TABLET ORAL DAILY
Qty: 30 TABLET | Refills: 2 | Status: SHIPPED | OUTPATIENT
Start: 2025-01-23

## 2025-01-27 ENCOUNTER — TELEPHONE (OUTPATIENT)
Dept: INTERNAL MEDICINE | Facility: CLINIC | Age: 55
End: 2025-01-27
Payer: COMMERCIAL

## 2025-01-27 NOTE — TELEPHONE ENCOUNTER
S-(situation): patient doesn't know how to give Ozempic    B-(background): starting at 0.25 mg    A-(assessment): states she needs to start today. Writer looked up video on how to administer. Walked patient through how to give. Patient stated understanding. Gave herself the first shot.     R-(recommendations): Advised to call back with any questions or if she would still like to see a nurse. Patient stated an understanding and agreed with plan.      FRANCE MCINTOSH RN on 1/27/2025 at 4:51 PM   [facility]

## 2025-02-02 DIAGNOSIS — F41.9 ANXIETY: ICD-10-CM

## 2025-02-03 DIAGNOSIS — J44.9 COPD, MODERATE (H): ICD-10-CM

## 2025-02-03 RX ORDER — HYDROXYZINE HYDROCHLORIDE 50 MG/1
TABLET, FILM COATED ORAL
Qty: 120 TABLET | Refills: 1 | Status: SHIPPED | OUTPATIENT
Start: 2025-02-03

## 2025-02-03 RX ORDER — CETIRIZINE HYDROCHLORIDE 10 MG/1
10 TABLET ORAL DAILY PRN
Qty: 90 TABLET | Refills: 1 | Status: SHIPPED | OUTPATIENT
Start: 2025-02-03

## 2025-02-11 ENCOUNTER — OFFICE VISIT (OUTPATIENT)
Dept: INTERNAL MEDICINE | Facility: CLINIC | Age: 55
End: 2025-02-11
Payer: COMMERCIAL

## 2025-02-11 ENCOUNTER — TELEPHONE (OUTPATIENT)
Dept: INTERNAL MEDICINE | Facility: CLINIC | Age: 55
End: 2025-02-11

## 2025-02-11 VITALS
OXYGEN SATURATION: 98 % | SYSTOLIC BLOOD PRESSURE: 118 MMHG | TEMPERATURE: 97.1 F | RESPIRATION RATE: 16 BRPM | WEIGHT: 223 LBS | HEART RATE: 79 BPM | BODY MASS INDEX: 38.07 KG/M2 | HEIGHT: 64 IN | DIASTOLIC BLOOD PRESSURE: 68 MMHG

## 2025-02-11 DIAGNOSIS — R73.01 IMPAIRED FASTING GLUCOSE: ICD-10-CM

## 2025-02-11 DIAGNOSIS — R20.8 COMPLAINING OF COLD HANDS: ICD-10-CM

## 2025-02-11 DIAGNOSIS — R09.89: Primary | ICD-10-CM

## 2025-02-11 DIAGNOSIS — E78.5 HYPERLIPIDEMIA LDL GOAL <100: ICD-10-CM

## 2025-02-11 DIAGNOSIS — J44.9 CHRONIC OBSTRUCTIVE PULMONARY DISEASE, UNSPECIFIED COPD TYPE (H): ICD-10-CM

## 2025-02-11 DIAGNOSIS — E53.1 VITAMIN B6 DEFICIENCY: ICD-10-CM

## 2025-02-11 DIAGNOSIS — L30.9 ECZEMA OF BOTH HANDS: ICD-10-CM

## 2025-02-11 PROCEDURE — G2211 COMPLEX E/M VISIT ADD ON: HCPCS | Performed by: INTERNAL MEDICINE

## 2025-02-11 PROCEDURE — 99214 OFFICE O/P EST MOD 30 MIN: CPT | Performed by: INTERNAL MEDICINE

## 2025-02-11 RX ORDER — BUSPIRONE HYDROCHLORIDE 10 MG/1
1 TABLET ORAL
COMMUNITY
Start: 2025-02-05

## 2025-02-11 RX ORDER — TRIAMCINOLONE ACETONIDE 1 MG/G
CREAM TOPICAL 2 TIMES DAILY
Qty: 30 G | Refills: 1 | Status: SHIPPED | OUTPATIENT
Start: 2025-02-11

## 2025-02-11 RX ORDER — FENOFIBRATE 54 MG/1
54 TABLET ORAL DAILY
Qty: 90 TABLET | Refills: 1 | Status: SHIPPED | OUTPATIENT
Start: 2025-02-11

## 2025-02-11 SDOH — HEALTH STABILITY: PHYSICAL HEALTH: ON AVERAGE, HOW MANY DAYS PER WEEK DO YOU ENGAGE IN MODERATE TO STRENUOUS EXERCISE (LIKE A BRISK WALK)?: 3 DAYS

## 2025-02-11 ASSESSMENT — SOCIAL DETERMINANTS OF HEALTH (SDOH): HOW OFTEN DO YOU GET TOGETHER WITH FRIENDS OR RELATIVES?: ONCE A WEEK

## 2025-02-11 NOTE — TELEPHONE ENCOUNTER
2/11/25 revision to plan medication changes form recieved via fax. Form in your mailbox for signature.

## 2025-02-11 NOTE — PATIENT INSTRUCTIONS
Plan:  Vit B complex 1 tablet daily   2. Start Fenofibrate 54 mg daily at dinner  3. ReSchedule ANNUAL exam in April  4. Please make a lab appointment for fasting labs in May  -- Make sure you drink plenty of water the day of the blood test.    5. Please make an appointment few days after the labs to discuss about the results.   6. We are working on ultrasound for the arms

## 2025-02-11 NOTE — PROGRESS NOTES
Dr Low's note    Patient's instructions / PLAN:                                                        Plan:  Vit B complex 1 tablet daily   2. Start Fenofibrate 54 mg daily at dinner  3. ReSchedule ANNUAL exam in April 4. Please make a lab appointment for fasting labs in May  -- Make sure you drink plenty of water the day of the blood test.    5. Please make an appointment few days after the labs to discuss about the results.   6. We are working on ultrasound for the arms      ASSESSMENT & PLAN:                                                      (R09.89) Asymmetrical brachial pulses  (primary encounter diagnosis)  Comment: decreased L radial pulse  Plan: US SILVIA Doppler No Exercise 1-2 Levels Bilateral            (R20.8) Complaining of cold hands  Comment: L hand ( today both hands are warm)  Plan: as above     (E53.1) Vitamin B6 deficiency  Comment:   Plan: vitamin B-Complex            (E78.5) Hyperlipidemia LDL goal <100  Comment: Not controlled  We discussed about the new meds, advantages and potential side effects. The patient will read also the info from the pharmacy and call back if questions.   Plan: fenofibrate (LOFIBRA) 54 MG tablet, Lipid panel        reflex to direct LDL Fasting, Comprehensive         metabolic panel            (L30.9) Eczema of both hands  Comment: better, but present symptoms   Plan: triamcinolone (KENALOG) 0.1 % external cream            (R73.01) Impaired fasting glucose  Comment:   Plan: Hemoglobin A1c            (J44.9) Chronic obstructive pulmonary disease, unspecified COPD type (H)  Comment: stable   Plan: Continue same meds, same doses for now        We addressed the med prob and didn't have time to address the Prev Exam    Chief Complaint:                                                        Annual exam -- NOT DONE  Follow up chronic medical problems      SUBJECTIVE:                                                    History of present illness     We reviewed the chronic  medical problems as above.   I reviewed the recent tests results in Epic     Discussion about health directives ( she wants DNR and I explained her the options)   -- she wants DNR because she is afraid she will never be able to get off ventilator.  -- she doesn't have terminal disease  -- I explained her the options: she may choose to be only temporarily  on a vent       L arm  -- [facility] RN couldn't find pulse in the L writs  -- L hand gets cold   -- /78 both arms  -- pulse less prominent on the L radial but present   -- diff to order SILVIA US  -- msg sent to our RN  RN  I wanted to order SILVIA US to assess if she has PAD ( periph artery disease) in the L arm. See note OV Feb 11.  The only option is a print order, which I did.  Please communicate with Rx Dept and see how she can get scheduled for this test  Thanks     Jan 16 labs -- Discussed      A1c - stable at 5.7    Low B 6    HLip  -- increased   -- Rosuvastatin 40 mg  --         ROS:                                                      ROS: negative for fever, chills, cough, wheezes, chest pain, shortness of breath, vomiting, abdominal pain, leg swelling         PMHx: - reviewed  Past Medical History:   Diagnosis Date    Benign essential hypertension     Bipolar disorder (H)     Chronic abdominal pain     Chronic constipation     COPD (chronic obstructive pulmonary disease) (H)     Hypothyroidism     Impaired fasting glucose     Kidney stone     Obesity (BMI 30-39.9)     PONV (postoperative nausea and vomiting)     Pre-diabetes     Pure hypercholesterolemia        PSHx: reviewed  Past Surgical History:   Procedure Laterality Date    ANESTHESIA OUT OF OR MRI N/A 10/07/2021    Procedure: ANESTHESIA OUT OF OR MRI;  Surgeon: GENERIC ANESTHESIA PROVIDER;  Location:  OR    ANESTHESIA OUT OF OR MRI N/A 5/11/2023    Procedure: MRI OF NECK WITH AND WITHOUT CONTRAST;  Surgeon: GENERIC ANESTHESIA PROVIDER;  Location:  OR    ANESTHESIA OUT OF OR MRI Left 6/26/2023     Procedure: Anesthesia out of OR MRI;  Surgeon: GENERIC ANESTHESIA PROVIDER;  Location:  OR    ANESTHESIA OUT OF OR MRI N/A 8/22/2024    Procedure: Anesthesia out of OR MRI lumbar spine;  Surgeon: GENERIC ANESTHESIA PROVIDER;  Location:  OR    APPENDECTOMY      ARTHROSCOPY SHOULDER DECOMPRESSION Left 10/21/2015    Procedure: ARTHROSCOPY SHOULDER DECOMPRESSION;  Surgeon: Julien Milian MD;  Location: [facility] OR    BIOPSY ARTERY TEMPORAL Left 03/11/2020    Procedure: LEFT TEMPORAL ARTERY BIOPSY;  Surgeon: Ibeth Conner MD;  Location: [facility] OR    BRONCHIAL THERMOPLASTY N/A 11/14/2014    Procedure: BRONCHIAL THERMOPLASTY;  Surgeon: Ward Whitaker MD;  [facility]: [facility]    BRONCHIAL THERMOPLASTY N/A 12/19/2014    Procedure: BRONCHIAL THERMOPLASTY;  Surgeon: Ward Whitaker MD;  [facility]: [facility]    BRONCHIAL THERMOPLASTY N/A 02/06/2015    Procedure: BRONCHIAL THERMOPLASTY;  Surgeon: Ward Whitaker MD;  [facility]    COLONOSCOPY N/A 11/26/2018    Procedure: COLONOSCOPY ([facility]);  Surgeon: Marshall Oakes MD;  Location:  OR    COLONOSCOPY N/A 10/22/2020    Procedure: Colonoscopy;  Surgeon: Marshall Mccormick MD;  Location:  OR    COLONOSCOPY N/A 01/20/2023    Procedure: COLONOSCOPY, FLEXIBLE, WITH LESION REMOVAL USING SNARE;  Surgeon: Carson Domínguez MD;  Location:  GI    COMBINED CYSTOSCOPY, RETROGRADES, URETEROSCOPY, LASER HOLMIUM LITHOTRIPSY URETER(S), INSERT STENT Right 7/19/2023    Procedure: Cystoscopy, Right Ureteroscopy, Right Retrograde Pyelogram;  Surgeon: Sammy Herndon MD;  Location: [facility] OR    DISCECTOMY, FUSION CERVICAL ANTERIOR ONE LEVEL, COMBINED N/A 05/01/2018    Procedure: COMBINED DISCECTOMY, FUSION CERVICAL ANTERIOR ONE LEVEL;  1.  C5-C6 anterior cervical diskectomy and fusion.    2.  C5-C6 application of intervertebral biomechanical device for interbody fusion purposes.    3.  C5-C6 anterior instrumentation using the standard Kristin InViZia 24  mm plate with four associated bone screws, 12 mm in length.  Inferior screws are fixed.  Superior screws are va    ESOPHAGOSCOPY, GASTROSCOPY, DUODENOSCOPY (EGD), COMBINED N/A 10/22/2020    Procedure: Esophagoscopy, gastroscopy, duodenoscopy with biopsies;  Surgeon: Marshall Mccormick MD;  Location: [facility] OR    ESOPHAGOSCOPY, GASTROSCOPY, DUODENOSCOPY (EGD), COMBINED N/A 06/17/2021    Procedure: ESOPHAGOGASTRODUODENOSCOPY, WITH BIOPSY;  Surgeon: Darrel Damon MD;  Location: [facility] GI    EXCISE MASS NECK Left 02/01/2023    Procedure: exploration of  NECK left;  Surgeon: Ibeth Conner MD;  Location: [facility] OR    EXCISE MASS TRUNK Left 11/03/2021    Procedure: EXCISION LEFT SHOULDER LIPOMA;  Surgeon: Ibeth Conner MD;  Location: [facility] OR    EXCISE NODE MEDIASTINAL  04/26/2013    Procedure: EXCISE NODE MEDIASTINAL;;  Surgeon: Av Peña MD;  Location:  OR    LAPAROSCOPIC CHOLECYSTECTOMY N/A 10/19/2017    Procedure: LAPAROSCOPIC CHOLECYSTECTOMY;  LAPAROSCOPIC CHOLECYSTECTOMY;  Surgeon: Ney Jerry MD;  Location: [facility] OR    LARYNGOSCOPY, EXCISE VOCAL CORD LESION, COMBINED      THORACOSCOPY  04/26/2013    Procedure: THORACOSCOPY;  LEFT VIDEO ASSISTED THORACOSCOPY, RESECTION OF POSTERIOR MEDIASTINAL MASS;  Surgeon: Av Peña MD;  Location: [facility] OR    TONSILLECTOMY & ADENOIDECTOMY          Soc Hx: No daily alcohol, no smoking  Social History     Socioeconomic History    Marital status:      Spouse name: Not on file    Number of children: Not on file    Years of education: Not on file    Highest education level: Not on file   Occupational History    Not on file   Tobacco Use    Smoking status: Every Day     Current packs/day: 0.30     Average packs/day: 0.3 packs/day for 30.0 years (9.0 ttl pk-yrs)     Types: Cigarettes     Passive exposure: Past    Smokeless tobacco: Never    Tobacco comments:     2-3 cigs per day   Vaping Use    Vaping status: Never Used   Substance and Sexual  Activity    Alcohol use: Not Currently    Drug use: No    Sexual activity: Not Currently   Other Topics Concern     Service Not Asked    Blood Transfusions Not Asked    Caffeine Concern No     Comment: 4-5 cans of pop daily    Occupational Exposure Not Asked    Hobby Hazards Not Asked    Sleep Concern Not Asked    Stress Concern Not Asked    Weight Concern Not Asked    Special Diet No    Back Care Not Asked    Exercise No     Comment: on hold    Bike Helmet Not Asked    Seat Belt Not Asked    Self-Exams Not Asked    Parent/sibling w/ CABG, MI or angioplasty before 65F 55M? Yes   Social History Narrative    Not on file     Social Drivers of Health     Financial Resource Strain: Low Risk  (2/11/2025)    Financial Resource Strain     Within the past 12 months, have you or your family members you live with been unable to get utilities (heat, electricity) when it was really needed?: No   Food Insecurity: High Risk (2/11/2025)    Food Insecurity     Within the past 12 months, did you worry that your food would run out before you got money to buy more?: Yes     Within the past 12 months, did the food you bought just not last and you didn t have money to get more?: Yes   Transportation Needs: Low Risk  (2/11/2025)    Transportation Needs     Within the past 12 months, has lack of transportation kept you from medical appointments, getting your medicines, non-medical meetings or appointments, work, or from getting things that you need?: No   Physical Activity: Unknown (2/11/2025)    Exercise Vital Sign     Days of Exercise per Week: 3 days     Minutes of Exercise per Session: Not on file   Stress: Stress Concern Present (2/11/2025)    [employer] - Occupational Stress Questionnaire     Feeling of Stress : Very much   Social Connections: Unknown (2/11/2025)    Social Connection and Isolation Panel [NHANES]     Frequency of Communication with Friends and Family: Not on file     Frequency of  Social Gatherings with Friends and Family: Once a week     Attends [employer] Services: Not on file     Active Member of Clubs or Organizations: Not on file     Attends Club or Organization Meetings: Not on file     Marital Status: Not on file   Interpersonal Safety: Not At Risk (11/17/2024)    Received from [facility]     Humiliation, Afraid, Rape, and Kick questionnaire     Fear of Current or Ex-Partner: No     Emotionally Abused: No     Physically Abused: No     Sexually Abused: No   Housing Stability: High Risk (2/11/2025)    Housing Stability     Do you have housing? : No     Are you worried about losing your housing?: No        Fam Hx: reviewed  Family History   Problem Relation Age of Onset    Myocardial Infarction Mother     Hypertension Mother     Cerebrovascular Disease Mother          Screening: reviewed      All: reviewed    Meds: reviewed  Current Outpatient Medications   Medication Sig Dispense Refill    ACCU-CHEK GUIDE test strip USE TO TEST BLOOD SUGAR ONCE A DAY OR AS DIRECTED 100 strip 1    acetaminophen (TYLENOL) 500 MG tablet Take 2 tablets (1,000 mg) by mouth every 6 hours as needed for pain      albuterol (PROAIR HFA/PROVENTIL HFA/VENTOLIN HFA) 108 (90 Base) MCG/ACT inhaler INHALE TWO PUFFS BY MOUTH EVERY 6 HOURS 6.7 g 0    albuterol (PROVENTIL) (2.5 MG/3ML) 0.083% neb solution INHALE ONE VIAL BY NEBULIZER EVERY 6 HOURS AS NEEDED FOR SHORTNESS OF BREATH OR WHEEZING Strength: (2.5 MG/3ML) 0.083% 75 mL 1    ALLERGY RELIEF CETIRIZINE 10 MG tablet TAKE ONE TABLET BY MOUTH EVERY DAY AS NEEDED 90 tablet 1    benzoyl peroxide (ACNE-CLEAR) 10 % external gel Apply topically 2 times daily as needed 90 g 1    blood glucose (NO BRAND SPECIFIED) lancets standard Use to test blood sugar 2 times daily or as directed. 200 each 3    blood glucose (NO BRAND SPECIFIED) test strip Use to test blood sugar 2 times daily or as directed. 200 strip 3    blood glucose (NO BRAND SPECIFIED) test strip Use to  test blood sugar 1 times daily or as directed.  Dispense Accu-chek Olinda Plus or per patient insurance 100 strip 3    blood glucose monitoring (NO BRAND SPECIFIED) meter device kit Use to test blood sugar 2 times daily or as directed. 1 kit 0    blood glucose monitoring (SOFTCLIX) lancets USE TO TEST BLOOD SUGARS ONCE DAILY OR AS DIRECTED 100 each 0    buPROPion (WELLBUTRIN XL) 150 MG 24 hr tablet TAKE ONE TABLET BY MOUTH EVERY MORNING 90 tablet 2    busPIRone (BUSPAR) 10 MG tablet Take 1 tablet by mouth 2 times daily.      clindamycin (CLINDAMAX) 1 % external gel Apply topically 2 times daily 60 g 3    clonazePAM (KLONOPIN) 1 MG tablet Take 1 tablet by mouth 2 times daily      clotrimazole (LOTRIMIN) 1 % external cream Apply topically 2 times daily 30 g 0    erythromycin (ROMYCIN) 5 MG/GM ophthalmic ointment Place 0.5 inches into both eyes 2 times daily. 3.5 g 0    eszopiclone (LUNESTA) 2 MG tablet Take 2 mg by mouth at bedtime      fluticasone (FLONASE) 50 MCG/ACT nasal spray Spray 1 spray into both nostrils daily. 16 g 5    Fluticasone-Umeclidin-Vilant (TRELEGY ELLIPTA) 100-62.5-25 MCG/ACT oral inhaler Inhale 1 puff into the lungs daily. 3 each 3    furosemide (LASIX) 20 MG tablet TAKE 1 TABLET BY MOUTH TWICE DAILY 180 tablet 3    gabapentin (NEURONTIN) 100 MG capsule TAKE ONE CAPSULE BY MOUTH THREE TIMES A DAY 90 capsule 4    hydrocortisone 2.5 % cream APPLY TOPICALLY TWO TIMES A DAY 30 g 0    hydrOXYzine HCl (ATARAX) 50 MG tablet TAKE TWO TABLETS BY MOUTH THREE TIMES A DAY AS NEEDED FOR ANXIETY 120 tablet 1    ibuprofen (ADVIL/MOTRIN) 200 MG tablet Take 3 tablets (600 mg) by mouth every 6 hours as needed for moderate pain (4-6)      lamoTRIgine (LAMICTAL) 200 MG tablet Take 1 tablet by mouth 2 times daily      levothyroxine (SYNTHROID/LEVOTHROID) 50 MCG tablet TAKE ONE TABLET BY MOUTH EVERY DAY 90 tablet 2    lisinopril (ZESTRIL) 10 MG tablet Take 2 tablets (20 mg) by mouth daily. 180 tablet 1    metFORMIN  (GLUCOPHAGE) 500 MG tablet Take 1 tablet (500 mg) by mouth daily (with breakfast). 90 tablet 1    montelukast (SINGULAIR) 10 MG tablet TAKE ONE TABLET BY MOUTH AT BEDTIME 90 tablet 3    multivitamin, therapeutic (THERA-VIT) TABS tablet Take 1 tablet by mouth daily      nebivolol (BYSTOLIC) 5 MG tablet Take 1 tablet (5 mg) by mouth daily. Appointment needed for additional refills. Please call [phone] to schedule. 30 tablet 2    nitroGLYcerin (NITROSTAT) 0.4 MG sublingual tablet PLACE ONE TABLET UNDER THE TONGUE AT THE 1ST SIGN OF ATTACK. IF PAIN IS UNRELIEVED OR WORSENED 5 MINUTES AFTER 1ST DOSE, PROMPT MEDICAL ASSISTANCE IS NEEDED. MAY REPEAT EVERY 5 MINUTES UNTIL PAIN IS RELIEVED. MAX OF THREE DOSES 25 tablet 4    nystatin (NYAMYC) 680464 UNIT/GM external powder APPLY TO AFFECTED AREA(S) TWO TIMES A  g 0    omeprazole (PRILOSEC) 40 MG DR capsule TAKE ONE CAPSULE BY MOUTH TWICE A DAY FOR 1 MONTH THEN TAKE ONE CAPSULE EVERY DAY 60 capsule 2    ondansetron (ZOFRAN ODT) 4 MG ODT tab Take 1 tablet (4 mg) by mouth every 8 hours as needed for nausea 4 tablet 0    rosuvastatin (CRESTOR) 40 MG tablet Take 1 tablet (40 mg) by mouth daily. 90 tablet 2    semaglutide (OZEMPIC) 2 MG/3ML pen Inject 0.25 mg subcutaneously every 7 days for 28 days, THEN 0.5 mg every 7 days for 28 days. 3 mL 1    sucralfate (CARAFATE) 1 GM tablet TAKE ONE TABLET BY MOUTH FOUR TIMES A DAY AS NEEDED FOR NAUSEA 120 tablet 0    triamcinolone (KENALOG) 0.1 % external cream Apply topically 2 times daily. 30 g 1    vitamin B6 (PYRIDOXINE) 100 MG tablet Take 1 tablet (100 mg) by mouth daily. 90 tablet 4    Vitamin D3 (CHOLECALCIFEROL) 125 MCG (5000 UT) tablet Take 1 tablet by mouth three times a week      OLANZapine (ZYPREXA) 5 MG tablet Take 10 mg by mouth at bedtime.      RYBELSUS 14 MG tablet TAKE ONE TABLET BY MOUTH EVERY DAY 90 tablet 0       OBJECTIVE:                                                    Physical Exam :  Blood pressure  "118/68, pulse 79, temperature 97.1  F (36.2  C), temperature source Tympanic, resp. rate 16, height 1.626 m (5' 4\"), weight 101.2 kg (223 lb), last menstrual period 01/01/2014, SpO2 98%, not currently breastfeeding.     NAD, appears comfortable  Skin clear, no rashes  Neck: supple, no JVD,  no thyroidmegaly  Lymph nodes non palpable in the cervical, supraclavicular axillaries,   Chest: clear to auscultation with good respiratory effort  Cardiac: S1S2, RRR, no mgr appreciated  Abdomen: soft, not tender,   Extremities: no cyanosis, clubbing or edema.   Neuro: A, Ox3, no focal signs.      The longitudinal plan of care for the diagnosis(es)/condition(s) as documented were addressed during this visit. Due to the added complexity in care, I will continue to support Swetha in the subsequent management and with ongoing continuity of care.    40 minutes spent on the date of the encounter doing   chart review,   review of outside records,   review of test results,   interpretation of tests,   patient visit,   documentation,       Roro Low MD  Internal Medicine       ####################################    Preventive Care Visit  -- NOT DONE  [facility]  Roro Chawla MD, Internal Medicine  Feb 11, 2025          Malena Posada is a 54 year old, presenting for the following:  Physical (fasting)        2/11/2025     8:55 AM   Additional Questions   Roomed by Rosalba POND   Accompanied by Stephany LARKIN          Health Care Directive  Patient does not have a Health Care Directive: Discussed advance care planning with patient; information given to patient to review.      2/11/2025   General Health   How would you rate your overall physical health? (!) FAIR   Feel stress (tense, anxious, or unable to sleep) Very much   (!) STRESS CONCERN      2/11/2025   Nutrition   Diet: Diabetic         2/11/2025   Exercise   Days per week of moderate/strenous exercise 3 days         " 2/11/2025   Social Factors   Frequency of gathering with friends or relatives Once a week   Worry food won't last until get money to buy more Yes   Food not last or not have enough money for food? Yes   Do you have housing? (Housing is defined as stable permanent housing and does not include staying ouside in a car, in a tent, in an abandoned building, in an overnight shelter, or couch-surfing.) No   Are you worried about losing your housing? No   Lack of transportation? No   Unable to get utilities (heat,electricity)? No   Want help with housing or utility concern? No   (!) FOOD SECURITY CONCERN PRESENT(!) HOUSING CONCERN PRESENT      2/11/2025   Fall Risk   Fallen 2 or more times in the past year? Yes   Trouble with walking or balance? Yes           2/11/2025   Activities of Daily Living- Home Safety   Needs help with the following daily activites Transportation    Shopping    Housework   Safety concerns in the home None of the above       Multiple values from one day are sorted in reverse-chronological order         2/11/2025   Dental   Dentist two times every year? (!) NO         2/11/2025   Hearing Screening   Hearing concerns? None of the above         2/11/2025   Driving Risk Screening   Patient/family members have concerns about driving No         2/11/2025   General Alertness/Fatigue Screening   Have you been more tired than usual lately? (!) YES         2/11/2025   Urinary Incontinence Screening   Bothered by leaking urine in past 6 months No          Today's PHQ-9 Score:       2/11/2025     8:46 AM   PHQ-9 SCORE   PHQ-9 Total Score MyChart 11 (Moderate depression)   PHQ-9 Total Score 11        Patient-reported         2/11/2025   Substance Use   If I could quit smoking, I would Neutral   I want to quit somking, worry about health affects Neutral   Willing to make a plan to quit smoking Neutral   Willing to cut down before quitting Somewhat agree   Alcohol more than 3/day or more than 7/wk Not Applicable    Do you have a current opioid prescription? No   How severe/bad is pain from 1 to 10? 7/10   Do you use any other substances recreationally? No     Social History     Tobacco Use    Smoking status: Every Day     Current packs/day: 0.30     Average packs/day: 0.3 packs/day for 30.0 years (9.0 ttl pk-yrs)     Types: Cigarettes     Passive exposure: Past    Smokeless tobacco: Never    Tobacco comments:     2-3 cigs per day   Vaping Use    Vaping status: Never Used   Substance Use Topics    Alcohol use: Not Currently    Drug use: No           8/24/2022   LAST FHS-7 RESULTS   1st degree relative breast or ovarian cancer No   Any relative bilateral breast cancer No   Any male have breast cancer No   Any ONE woman have BOTH breast AND ovarian cancer No   Any woman with breast cancer before 50yrs No   2 or more relatives with breast AND/OR ovarian cancer Unknown   2 or more relatives with breast AND/OR bowel cancer No              History of abnormal Pap smear:         Latest Ref Rng & Units 3/11/2016     8:20 AM 3/11/2016    12:00 AM 6/12/2012    10:03 AM   PAP / HPV   PAP (Historical)   NIL  NIL    HPV 16 DNA NEG Negative      HPV 18 DNA NEG Negative      Other HR HPV NEG Negative        ASCVD Risk   The 10-year ASCVD risk score (Lizzette DK, et al., 2019) is: 15.1%    Values used to calculate the score:      Age: 54 years      Sex: Female      Is Non- : No      Diabetic: Yes      Tobacco smoker: Yes      Systolic Blood Pressure: 118 mmHg      Is BP treated: Yes      HDL Cholesterol: 42 mg/dL      Total Cholesterol: 228 mg/dL            Reviewed and updated as needed this visit by Provider                      Current providers sharing in care for this patient include:  Patient Care Team:  Roro Chawla MD as PCP - General (Internal Medicine)  Roro Chawla MD as Assigned PCP  Dipti Mayen MD as MD (Otolaryngology)  Lis Talbert MD as MD  (Otolaryngology)  Bhavani Tanner MD as MD (Urology)  Eduarda Pandya, RN as Registered Nurse (Urology)  [place] [facility]  Outside, Provider as   Serafin Wiley MD as MD (Gastroenterology)  Jakob Lawrence MD as MD (Otolaryngology)  Lamont Jordan MD as MD (Dermatology)  Rush Fraga MD as MD (Pulmonary Disease)  Kandice Felton, SLP as Speech Language Pathologist (Speech Language/Path)  Ashley Antonio PA-C as Physician Assistant (Dermatology)  Eileen Pak PA-C as Physician Assistant (Dermatology)  Lisa Harris DO as MD (Gastroenterology)  Srini Wong PA-C as Referring Physician (Family Medicine)  Bloch, Lauren Turner, RPH as Pharmacist (Pharmacist)  Lesley Morales PA-C as Physician Assistant (Cardiovascular Disease)  Rika Petit RD as Registered Dietitian (Dietitian, Registered)  Quinten Adam MD as MD (Neurology)  Sammy Herndon MD as MD (Urology)  Kassidy Tate DO as Assigned Heart and Vascular Provider  Berta Travis APRN CNP as Assigned Surgical Provider  No Ref-Primary, Physician  Rey Tay RPH as Pharmacist (Pharmacist)  Alfie Mcknight MD as Resident (Dermatology)  Marcie Colorado PA-C as Physician Assistant (Dermatology)  Brandon Perea MD as MD (Dermatology)  Vicky Robbins RPH as Pharmacist (Pharmacist)  Vicky Robbins RPH as Assigned MTM Pharmacist    The following health maintenance items are reviewed in Epic and correct as of today:  Health Maintenance   Topic Date Due    MAMMO SCREENING  06/18/2016    Pneumococcal Vaccine: 50+ Years (2 of 2 - PCV) 11/21/2019    ZOSTER IMMUNIZATION (2 of 2) 12/02/2020    HPV TEST  03/11/2021    PAP  03/11/2021    LUNG CANCER SCREENING  05/28/2022    URINE DRUG SCREEN  12/23/2023    INFLUENZA VACCINE (1) 09/01/2024    COVID-19 Vaccine (6 - 2024-25 season) 09/01/2024    MEDICARE  "ANNUAL WELLNESS VISIT  10/18/2024    ANNUAL REVIEW OF HM ORDERS  03/11/2025    COPD ACTION PLAN  05/07/2053 (Originally 1970)    GIDEON ASSESSMENT  03/11/2025    MICROALBUMIN  04/09/2025    EYE EXAM  04/17/2025    NICOTINE/TOBACCO CESSATION COUNSELING Q 1 YR  01/08/2026    BMP  01/16/2026    LIPID  01/16/2026    TSH W/FREE T4 REFLEX  01/16/2026    DTAP/TDAP/TD IMMUNIZATION (5 - Td or Tdap) 02/09/2028    ADVANCE CARE PLANNING  08/19/2029    COLORECTAL CANCER SCREENING  01/20/2030    SPIROMETRY  Completed    HEPATITIS C SCREENING  Completed    HIV SCREENING  Completed    Medicare Annual [place] Pharmacist Visit (once per calendar year)  Completed    HPV IMMUNIZATION  Aged Out    MENINGITIS IMMUNIZATION  Aged Out    A1C  Discontinued    DIABETIC FOOT EXAM  Discontinued    HEPATITIS B IMMUNIZATION  Discontinued            Objective    Exam  /68   Pulse 79   Temp 97.1  F (36.2  C) (Tympanic)   Resp 16   Ht 1.626 m (5' 4\")   Wt 101.2 kg (223 lb)   LMP 01/01/2014 (Approximate)   SpO2 98%   BMI 38.28 kg/m     Estimated body mass index is 38.28 kg/m  as calculated from the following:    Height as of this encounter: 1.626 m (5' 4\").    Weight as of this encounter: 101.2 kg (223 lb).    Physical Exam           No data to display                       Signed Electronically by: Roro Chawla MD    Answers submitted by the patient for this visit:  Patient Health Questionnaire (Submitted on 2/11/2025)  If you checked off any problems, how difficult have these problems made it for you to do your work, take care of things at home, or get along with other people?: Somewhat difficult  PHQ9 TOTAL SCORE: 11    "

## 2025-02-11 NOTE — NURSING NOTE
"Chief Complaint   Patient presents with    Physical     fasting     initial /68   Pulse 79   Temp 97.1  F (36.2  C) (Tympanic)   Resp 16   Ht 1.626 m (5' 4\")   Wt 101.2 kg (223 lb)   LMP 01/01/2014 (Approximate)   SpO2 98%   BMI 38.28 kg/m   Estimated body mass index is 38.28 kg/m  as calculated from the following:    Height as of this encounter: 1.626 m (5' 4\").    Weight as of this encounter: 101.2 kg (223 lb)..  bp completed using cuff size large  ALBERT RAMAN LPN  "

## 2025-02-11 NOTE — TELEPHONE ENCOUNTER
RN    I wanted to order SILVIA US to assess if she has PAD ( periph artery disease) in the L arm. See note OV Feb 11.    The only option is a print order, which I did.    Please communicate with Rx Dept and see how she can get scheduled for this test    Thanks

## 2025-02-12 ENCOUNTER — MEDICAL CORRESPONDENCE (OUTPATIENT)
Dept: HEALTH INFORMATION MANAGEMENT | Facility: CLINIC | Age: 55
End: 2025-02-12

## 2025-02-13 ENCOUNTER — TELEPHONE (OUTPATIENT)
Dept: INTERNAL MEDICINE | Facility: CLINIC | Age: 55
End: 2025-02-13
Payer: COMMERCIAL

## 2025-02-13 NOTE — TELEPHONE ENCOUNTER
2/17/25 to 4/17/25 Home health certfication and Plan of care form recieved via fax. Form in your mailbox for signature.

## 2025-02-17 NOTE — TELEPHONE ENCOUNTER
Form completed and faxed to [phone]  [facility]

## 2025-02-19 ENCOUNTER — TELEPHONE (OUTPATIENT)
Dept: INTERNAL MEDICINE | Facility: CLINIC | Age: 55
End: 2025-02-19
Payer: COMMERCIAL

## 2025-02-20 RX ORDER — ATORVASTATIN CALCIUM 80 MG/1
1 TABLET, FILM COATED ORAL
COMMUNITY
Start: 2025-02-05 | End: 2025-02-24

## 2025-02-24 ENCOUNTER — TELEPHONE (OUTPATIENT)
Dept: INTERNAL MEDICINE | Facility: CLINIC | Age: 55
End: 2025-02-24

## 2025-02-24 ENCOUNTER — OFFICE VISIT (OUTPATIENT)
Dept: INTERNAL MEDICINE | Facility: CLINIC | Age: 55
End: 2025-02-24
Payer: COMMERCIAL

## 2025-02-24 VITALS
SYSTOLIC BLOOD PRESSURE: 118 MMHG | TEMPERATURE: 97.7 F | HEIGHT: 64 IN | WEIGHT: 221 LBS | DIASTOLIC BLOOD PRESSURE: 82 MMHG | OXYGEN SATURATION: 96 % | HEART RATE: 63 BPM | BODY MASS INDEX: 37.73 KG/M2 | RESPIRATION RATE: 20 BRPM

## 2025-02-24 DIAGNOSIS — G47.00 INSOMNIA, UNSPECIFIED TYPE: ICD-10-CM

## 2025-02-24 DIAGNOSIS — E66.812 CLASS 2 SEVERE OBESITY DUE TO EXCESS CALORIES WITH SERIOUS COMORBIDITY AND BODY MASS INDEX (BMI) OF 37.0 TO 37.9 IN ADULT (H): ICD-10-CM

## 2025-02-24 DIAGNOSIS — J30.2 SEASONAL ALLERGIC RHINITIS, UNSPECIFIED TRIGGER: ICD-10-CM

## 2025-02-24 DIAGNOSIS — E66.01 CLASS 2 SEVERE OBESITY DUE TO EXCESS CALORIES WITH SERIOUS COMORBIDITY AND BODY MASS INDEX (BMI) OF 37.0 TO 37.9 IN ADULT (H): ICD-10-CM

## 2025-02-24 DIAGNOSIS — R29.6 FALLS FREQUENTLY: ICD-10-CM

## 2025-02-24 DIAGNOSIS — K21.00 GASTROESOPHAGEAL REFLUX DISEASE WITH ESOPHAGITIS, UNSPECIFIED WHETHER HEMORRHAGE: ICD-10-CM

## 2025-02-24 DIAGNOSIS — J44.9 CHRONIC OBSTRUCTIVE PULMONARY DISEASE, UNSPECIFIED COPD TYPE (H): ICD-10-CM

## 2025-02-24 DIAGNOSIS — E78.00 PURE HYPERCHOLESTEROLEMIA: ICD-10-CM

## 2025-02-24 DIAGNOSIS — I10 BENIGN ESSENTIAL HYPERTENSION: ICD-10-CM

## 2025-02-24 DIAGNOSIS — Z01.818 PREOP GENERAL PHYSICAL EXAM: Primary | ICD-10-CM

## 2025-02-24 DIAGNOSIS — R42 DIZZINESS: ICD-10-CM

## 2025-02-24 DIAGNOSIS — E06.3 HYPOTHYROIDISM DUE TO HASHIMOTO THYROIDITIS: ICD-10-CM

## 2025-02-24 DIAGNOSIS — F31.60 BIPOLAR AFFECTIVE DISORDER, CURRENT EPISODE MIXED, CURRENT EPISODE SEVERITY UNSPECIFIED (H): ICD-10-CM

## 2025-02-24 PROCEDURE — 99214 OFFICE O/P EST MOD 30 MIN: CPT

## 2025-02-24 PROCEDURE — 93000 ELECTROCARDIOGRAM COMPLETE: CPT

## 2025-02-24 NOTE — PATIENT INSTRUCTIONS
How to Take Your Medication Before Surgery  Preoperative Medication Instructions   Antiplatelet or Anticoagulation Medication Instructions   - We reviewed the medication list and the patient is not on an antiplatelet or anticoagulation medications.    Additional Medication Instructions  Take all scheduled medications on the day of surgery EXCEPT for modifications listed below:   - Herbal medications and vitamins: DO NOT TAKE 14 days prior to surgery.   - ACE/ARB/ARNI (lisinopril, enalapril, losartan, valsartan, olmesartan, sacubritril/valsartan) : DO NOT TAKE on day of surgery (minimum 11 hours for general anesthesia).   - Beta Blockers (atenolol, metoprolol, propranolol) : Continue taking on the day of surgery.   - Diuretics (furosemide, hydrochlorothiazide, chlorothalidone): DO NOT TAKE on the day of surgery.   - fenofibrate, niacin, non-statin lipid meds: DO NOT TAKE on day of surgery.   - Statins (atorvastatin, simvastatin, pravastatin) : Continue taking on the day of surgery.    - metformin: DO NOT TAKE day of surgery.   - GLP-1 oral semaglutide: DO NOT TAKE 7 days before surgery   - pregabalin, gabapentin: Continue without modification.   - ibuprofen (Advil, Motrin): DO NOT TAKE 1 day before surgery.    - naproxen (Aleve, Naprosyn): DO NOT TAKE 4 days before surgery.    - Benzodiazepines: Continue without modification.   - SSRIs, SNRIs, TCAs, Antipsychotics: Continue without modification.    - cannabis, marijuana: DO NOT TAKE night before surgery.   - cetirizine and first generation antihistamines: DO NOT TAKE on day of surgery.   - leukotriene Inhibitors: Continue without modifcation.   - LABA, inhaled corticosteroid, long-acting anticholinergics: Continue without modification.   - rescue Inhaler: Continue PRN. Bring to hospital on the day of surgery.   Per surgeon recommendations take your nebivolol and do not take any of the other medications until home after the MRI    Patient Education   Preparing for  Your Surgery  For Adults  Getting started  In most cases, a nurse will call to review your health history and instructions. They will give you an arrival time based on your scheduled surgery time. Please be ready to share:  Your doctor's clinic name and phone number  Your medical, surgical, and anesthesia history  A list of allergies and sensitivities  A list of medicines, including herbal treatments and over-the-counter drugs  Whether the patient has a legal guardian (ask how to send us the papers in advance)  Note: You may not receive a call if you were seen at our PAC (Preoperative Assessment Center).  Please tell us if you're pregnant--or if there's any chance you might be pregnant. Some surgeries may injure a fetus (unborn baby), so they require a pregnancy test. Surgeries that are safe for a fetus don't always need a test, and you can choose whether to have one.   Preparing for surgery  Within 10 to 30 days of surgery: Have a pre-op exam (sometimes called an H&P, or History and Physical). This can be done at a clinic or pre-operative center.  If you're having a , you may not need this exam. Talk to your care team.  At your pre-op exam, talk to your care team about all medicines you take. (This includes CBD oil and any drugs, such as THC, marijuana, and other forms of cannabis.) If you need to stop any medicine before surgery, ask when to start taking it again.  This is for your safety. Many medicines and drugs can make you bleed too much during surgery. Some change how well surgery (anesthesia) drugs work.  Call your insurance company to let them know you're having surgery. (If you don't have insurance, call [phone].)  Call your clinic if there's any change in your health. This includes a scrape or scratch near the surgery site, or any signs of a cold (sore throat, runny nose, cough, rash, fever).  Eating and drinking guidelines  For your safety: Unless your surgeon tells you otherwise, follow  the guidelines below.  Eat and drink as normal until 8 hours before you arrive for surgery. After that, no food or milk. You can spit out gum when you arrive.  Drink clear liquids until 2 hours before you arrive. These are liquids you can see through, like water, Gatorade, and Propel Water. They also include plain black coffee and tea (no cream or milk).  No alcohol for 24 hours before you arrive. The night before surgery, stop any drinks that contain THC.  If your care team tells you to take medicine on the morning of surgery, it's okay to take it with a sip of water. No other medicines or drugs are allowed (including CBD oil)--follow your care team's instructions.  If you have questions the day of surgery, call your hospital or surgery center.   Preventing infection  Shower or bathe the night before and the morning of surgery. Follow the instructions your clinic gave you. (If no instructions, use regular soap.)  Don't shave or clip hair near your surgery site. We'll remove the hair if needed.  Don't smoke or vape the morning of surgery. No chewing tobacco for 6 hours before you arrive. A nicotine patch is okay. You may spit out nicotine gum when you arrive.  For some surgeries, the surgeon will tell you to fully quit smoking and nicotine.  We will make every effort to keep you safe from infection. We will:  Clean our hands often with soap and water (or an alcohol-based hand rub).  Clean the skin at your surgery site with a special soap that kills germs.  Give you a special gown to keep you warm. (Cold raises the risk of infection.)  Wear hair covers, masks, gowns, and gloves during surgery.  Give antibiotic medicine, if prescribed. Not all surgeries need this medicine.  What to bring on the day of surgery  Photo ID and insurance card  Copy of your health care directive, if you have one  Glasses and hearing aids (bring cases)  You can't wear contacts during surgery  Inhaler and eye drops, if you use them (tell us  about these when you arrive)  CPAP machine or breathing device, if you use them  A few personal items, if spending the night  If you have . . .  A pacemaker, ICD (cardiac defibrillator), or other implant: Bring the ID card.  An implanted stimulator: Bring the remote control.  A legal guardian: Bring a copy of the certified (court-stamped) guardianship papers.  Please remove any jewelry, including body piercings. Leave jewelry and other valuables at home.  If you're going home the day of surgery  You must have a responsible adult drive you home. They should stay with you overnight as well.  If you don't have someone to stay with you, and you aren't safe to go home alone, we may keep you overnight. Insurance often won't pay for this.  After surgery  If it's hard to control your pain or you need more pain medicine, please call your surgeon's office.  Questions?   If you have any questions for your care team, list them here:   ____________________________________________________________________________________________________________________________________________________________________________________________________________________________________________________________  For informational purposes only. Not to replace the advice of your health care provider. Copyright   2003, 2019 [email or web address]. All rights reserved. Clinically reviewed by Damon Chatterjee MD. [employer] [id number] - REV 08/24.

## 2025-02-24 NOTE — TELEPHONE ENCOUNTER
Call to patient. Advised. States her home care nurse is there and was able to answer this question for her.

## 2025-02-24 NOTE — PROGRESS NOTES
Preoperative Evaluation  [place] [address]  [address]  [facility] [address]  Phone: [phone]  Primary Provider: Roro Chawla MD  Pre-op Performing Provider: LEIGH Dixon CNP  Feb 24, 2025 8/19/2024   Surgical Information   What procedure is being done? bonita   Facility or Hospital where procedure/surgery will be performed: [facility]   Who is doing the procedure / surgery? dr mandujano tcroberto   Date of surgery / procedure: 02/27/2025   Time of surgery / procedure: 6.00 am show up time   Where do you plan to recover after surgery? at home with family     Fax number for surgical facility: Note does not need to be faxed, will be available electronically in Epic.    Assessment & Plan     The proposed surgical procedure is considered LOW risk.    Preop general physical exam  - EKG 12-lead complete w/read - Clinics  Dizziness   Falls frequently   Pt has had a hx of dizziness and falling requiring MRI Head for evaluation of her condition on 2/27/2025.    Bipolar affective disorder, current episode mixed, current episode severity unspecified (H)  Per surgeon request hold Lamictal, clonazepam, buspirone and Wellbutrin the day of MRI    Benign essential hypertension  Per surgeon request take nebivolol the morning of MRI, do not take furosemide or lisinopril    Hypothyroidism due to Hashimoto thyroiditis  Do not take levothyroxine until MRI is completed    Class 2 severe obesity due to excess calories with serious comorbidity and body mass index (BMI) of 37.0 to 37.9 in adult (H)  Do not take Ozempic 7 days prior to MRI    Chronic obstructive pulmonary disease, unspecified COPD type (H)  He can take Trelegy and use rescue inhaler if needed morning of MRI it is okay to take montelukast with a sip of water in the morning prior to MRI,     Pure hypercholesterolemia  Rosuvastatin can be taken in the evening after return from MRI and do not  take fenofibrate in the morning per surgeon recommendation    Insomnia, unspecified type   It is okay to take Lunesta the night before her MRI    Gastroesophageal reflux disease with esophagitis, unspecified whether hemorrhage   It is okay to take omeprazole with a sip of water in the morning prior to MRI    Seasonal allergic rhinitis, unspecified trigger   Do not take cetirizine or hydroxyzine the morning of MRI     Surgeon instructed pt that she can hold all medications until after the MRI except for her nebivolol she can take with a sip of water     - No identified additional risk factors other than previously addressed    Preoperative Medication Instructions  Antiplatelet or Anticoagulation Medication Instructions   - We reviewed the medication list and the patient is not on an antiplatelet or anticoagulation medications.    Additional Medication Instructions  Take all scheduled medications on the day of surgery EXCEPT for modifications listed below:   - Herbal medications and vitamins: DO NOT TAKE 14 days prior to surgery.   - ACE/ARB/ARNI (lisinopril, enalapril, losartan, valsartan, olmesartan, sacubritril/valsartan) : DO NOT TAKE on day of surgery (minimum 11 hours for general anesthesia).   - Beta Blockers (atenolol, metoprolol, propranolol) : Continue taking on the day of surgery.   - Diuretics (furosemide, hydrochlorothiazide, chlorothalidone): DO NOT TAKE on the day of surgery.   - fenofibrate, niacin, non-statin lipid meds: DO NOT TAKE on day of surgery.   - Statins (atorvastatin, simvastatin, pravastatin) : Continue taking on the day of surgery.    - metformin: DO NOT TAKE day of surgery.   - GLP-1 oral semaglutide: DO NOT TAKE 7 days before surgery   - pregabalin, gabapentin: Continue without modification.   - ibuprofen (Advil, Motrin): DO NOT TAKE 1 day before surgery.    - naproxen (Aleve, Naprosyn): DO NOT TAKE 4 days before surgery.    - Benzodiazepines: Continue without modification.   - SSRIs,  SNRIs, TCAs, Antipsychotics: Continue without modification.    - cannabis, marijuana: DO NOT TAKE night before surgery.   - cetirizine and first generation antihistamines: DO NOT TAKE on day of surgery.   - leukotriene Inhibitors: Continue without modifcation.   - LABA, inhaled corticosteroid, long-acting anticholinergics: Continue without modification.   - rescue Inhaler: Continue PRN. Bring to hospital on the day of surgery.    Recommendation  Approval given to proceed with proposed procedure, without further diagnostic evaluation.    Malena Milian is a 54 year old, presenting for the following: preop visit prior to MRI Head (claustrophobia)  No chief complaint on file.        HPI related to upcoming procedure: Pt has had a hx of dizziness and falling requiring MRI Head for evaluation of her condition on 2/27/2025. Pt denies any fevers, night sweats or chills.  Pt denies any URI.    Reviewed the instructions with pt to be prepared to share her doctor's clinic name and phone number, her medical surgical and anesthesia history, a list of allergies and sensitivities, a list of medicines including herbal treatments and over-the-counter medications.  Advised patient to inform insurance company that she is having surgery and to call the clinic if there is any change in her health such as a scratch or scrape near the surgical site or any signs of a cold such as sore throat, runny nose, cough, rash, fever.     Provided patient with the eating and drinking guidelines and instructed patient to eat and drink as normal until 8 hours before she arrives for surgery and after that no food or milk.  Instructed patient that she can drink clear water liquids until 2 hours before she arrives.  Explained that clear liquids are those you can see through like water Gatorade and propel and includes black coffee and tea as long as there is no cream or milk in it.  Instructed patient to not drink alcohol for 24 hours before she arrives  which includes drinks that contain THC.  Instructed patient to shower/bathe the night before and the morning of surgery and to not smoke or vape the morning of surgery.       On the day of surgery instructed patient to bring photo ID and insurance card and a copy of her healthcare directive if she has 1 and to bring cases for glasses and hearing aids because she cannot wear contacts during surgery.  Instructed patient to remove any jewelry including body piercings and leave jewelry and valuables at home.       Advised patient to ensure she has a responsible adult driving her home the day of surgery and someone who will stay with her overnight.        2/24/2025   Pre-Op Questionnaire   Have you ever had a heart attack or stroke? No   Have you ever had surgery on your heart or blood vessels, such as a stent placement, a coronary artery bypass, or surgery on an artery in your head, neck, heart, or legs? No   Do you have chest pain with activity? No   Do you have a history of heart failure? No   Do you currently have a cold, bronchitis or symptoms of other infection? No   Do you have a cough, shortness of breath, or wheezing? No   Do you or anyone in your family have previous history of blood clots? No   Do you or does anyone in your family have a serious bleeding problem such as prolonged bleeding following surgeries or cuts? No   Have you ever had problems with anemia or been told to take iron pills? No   Have you had any abnormal blood loss such as black, tarry or bloody stools, or abnormal vaginal bleeding? No   Have you ever had a blood transfusion? No   Are you willing to have a blood transfusion if it is medically needed before, during, or after your surgery? Yes   Have you or any of your relatives ever had problems with anesthesia? No   Do you have sleep apnea, excessive snoring or daytime drowsiness? No   Do you have any artifical heart valves or other implanted medical devices like a pacemaker, defibrillator,  or continuous glucose monitor? No   Do you have artificial joints? No   Are you allergic to latex? No     Health Care Directive  Patient does not have a Health Care Directive: Discussed advance care planning with patient; however, patient declined at this time.    Preoperative Review of    reviewed - no record of controlled substances prescribed.          Patient Active Problem List    Diagnosis Date Noted    Ureteral stone with hydronephrosis 01/27/2024     Priority: Medium    Class 2 severe obesity due to excess calories with serious comorbidity in adult (H) 07/19/2023     Priority: Medium    PONV (postoperative nausea and vomiting) 05/07/2023     Priority: Medium    COPD (chronic obstructive pulmonary disease) (H) 05/07/2023     Priority: Medium    Obesity (BMI 30-39.9) 05/07/2023     Priority: Medium    Bipolar disorder (H) 05/07/2023     Priority: Medium    Benign essential hypertension 05/07/2023     Priority: Medium    Pure hypercholesterolemia 05/07/2023     Priority: Medium    Hypothyroidism 05/07/2023     Priority: Medium    Chronic abdominal pain 05/07/2023     Priority: Medium    Chronic constipation 05/07/2023     Priority: Medium    Impaired fasting glucose 05/07/2023     Priority: Medium      Past Medical History:   Diagnosis Date    Benign essential hypertension     Bipolar disorder (H)     Chronic abdominal pain     Chronic constipation     COPD (chronic obstructive pulmonary disease) (H)     Diabetes (H)     Gastroesophageal reflux disease     Hypothyroidism     Impaired fasting glucose     Kidney stone     Obesity (BMI 30-39.9)     PONV (postoperative nausea and vomiting)     Pre-diabetes     Pure hypercholesterolemia      Past Surgical History:   Procedure Laterality Date    ANESTHESIA OUT OF OR MRI N/A 10/07/2021    Procedure: ANESTHESIA OUT OF OR MRI;  Surgeon: GENERIC ANESTHESIA PROVIDER;  Location:  OR    ANESTHESIA OUT OF OR MRI N/A 5/11/2023    Procedure: MRI OF NECK WITH AND  WITHOUT CONTRAST;  Surgeon: GENERIC ANESTHESIA PROVIDER;  Location: [facility] OR    ANESTHESIA OUT OF OR MRI Left 6/26/2023    Procedure: Anesthesia out of OR MRI;  Surgeon: GENERIC ANESTHESIA PROVIDER;  Location: [facility]    ANESTHESIA OUT OF OR MRI N/A 8/22/2024    Procedure: Anesthesia out of OR MRI lumbar spine;  Surgeon: GENERIC ANESTHESIA PROVIDER;  Location: [facility] OR    APPENDECTOMY      ARTHROSCOPY SHOULDER DECOMPRESSION Left 10/21/2015    Procedure: ARTHROSCOPY SHOULDER DECOMPRESSION;  Surgeon: Julien Milian MD;  Location: [facility] OR    BIOPSY ARTERY TEMPORAL Left 03/11/2020    Procedure: LEFT TEMPORAL ARTERY BIOPSY;  Surgeon: Ibeth Conner MD;  Location: [facility]    BRONCHIAL THERMOPLASTY N/A 11/14/2014    Procedure: BRONCHIAL THERMOPLASTY;  Surgeon: Ward Whitaker MD;  [facility]: [facility]    BRONCHIAL THERMOPLASTY N/A 12/19/2014    Procedure: BRONCHIAL THERMOPLASTY;  Surgeon: Ward Whitaker MD;  [facility]    BRONCHIAL THERMOPLASTY N/A 02/06/2015    Procedure: BRONCHIAL THERMOPLASTY;  Surgeon: Ward Whitaker MD;  [facility]    COLONOSCOPY N/A 11/26/2018    Procedure: COLONOSCOPY (MN);  Surgeon: Marshall Oakes MD;  Location:  OR    COLONOSCOPY N/A 10/22/2020    Procedure: Colonoscopy;  Surgeon: Marshall Mccormick MD;  Location: [facility] OR    COLONOSCOPY N/A 01/20/2023    Procedure: COLONOSCOPY, FLEXIBLE, WITH LESION REMOVAL USING SNARE;  Surgeon: Carson Domínguez MD;  Location:  GI    COMBINED CYSTOSCOPY, RETROGRADES, URETEROSCOPY, LASER HOLMIUM LITHOTRIPSY URETER(S), INSERT STENT Right 7/19/2023    Procedure: Cystoscopy, Right Ureteroscopy, Right Retrograde Pyelogram;  Surgeon: Sammy Herndon MD;  Location: [facility] OR    DISCECTOMY, FUSION CERVICAL ANTERIOR ONE LEVEL, COMBINED N/A 05/01/2018    Procedure: COMBINED DISCECTOMY, FUSION CERVICAL ANTERIOR ONE LEVEL;  1.  C5-C6 anterior cervical diskectomy and fusion.    2.  C5-C6 application of intervertebral  biomechanical device for interbody fusion purposes.    3.  C5-C6 anterior instrumentation using the standard Kristin InViZia 24 mm plate with four associated bone screws, 12 mm in length.  Inferior screws are fixed.  Superior screws are va    ESOPHAGOSCOPY, GASTROSCOPY, DUODENOSCOPY (EGD), COMBINED N/A 10/22/2020    Procedure: Esophagoscopy, gastroscopy, duodenoscopy with biopsies;  Surgeon: Marshall Mccormick MD;  Location: [facility]    ESOPHAGOSCOPY, GASTROSCOPY, DUODENOSCOPY (EGD), COMBINED N/A 06/17/2021    Procedure: ESOPHAGOGASTRODUODENOSCOPY, WITH BIOPSY;  Surgeon: Darrel Damon MD;  Location: [facility]    EXCISE MASS NECK Left 02/01/2023    Procedure: exploration of  NECK left;  Surgeon: Ibeth Conner MD;  Location: [facility]    EXCISE MASS TRUNK Left 11/03/2021    Procedure: EXCISION LEFT SHOULDER LIPOMA;  Surgeon: Ibeth Conner MD;  Location: [facility] OR    EXCISE NODE MEDIASTINAL  04/26/2013    Procedure: EXCISE NODE MEDIASTINAL;;  Surgeon: Av Peña MD;  Location:  OR    LAPAROSCOPIC CHOLECYSTECTOMY N/A 10/19/2017    Procedure: LAPAROSCOPIC CHOLECYSTECTOMY;  LAPAROSCOPIC CHOLECYSTECTOMY;  Surgeon: Ney Jerry MD;  Location: [facility]    LARYNGOSCOPY, EXCISE VOCAL CORD LESION, COMBINED      THORACOSCOPY  04/26/2013    Procedure: THORACOSCOPY;  LEFT VIDEO ASSISTED THORACOSCOPY, RESECTION OF POSTERIOR MEDIASTINAL MASS;  Surgeon: Av Peña MD;  Location: [facility] OR    TONSILLECTOMY & ADENOIDECTOMY       Current Outpatient Medications   Medication Sig Dispense Refill    ACCU-CHEK GUIDE test strip USE TO TEST BLOOD SUGAR ONCE A DAY OR AS DIRECTED 100 strip 1    acetaminophen (TYLENOL) 500 MG tablet Take 2 tablets (1,000 mg) by mouth every 6 hours as needed for pain      albuterol (PROAIR HFA/PROVENTIL HFA/VENTOLIN HFA) 108 (90 Base) MCG/ACT inhaler INHALE TWO PUFFS BY MOUTH EVERY 6 HOURS 6.7 g 0    albuterol (PROVENTIL) (2.5 MG/3ML) 0.083% neb solution INHALE ONE VIAL BY NEBULIZER  EVERY 6 HOURS AS NEEDED FOR SHORTNESS OF BREATH OR WHEEZING Strength: (2.5 MG/3ML) 0.083% 75 mL 1    ALLERGY RELIEF CETIRIZINE 10 MG tablet TAKE ONE TABLET BY MOUTH EVERY DAY AS NEEDED 90 tablet 1    atorvastatin (LIPITOR) 80 MG tablet Take 1 tablet by mouth daily at 2 pm.      benzoyl peroxide (ACNE-CLEAR) 10 % external gel Apply topically 2 times daily as needed 90 g 1    blood glucose (NO BRAND SPECIFIED) lancets standard Use to test blood sugar 2 times daily or as directed. 200 each 3    blood glucose (NO BRAND SPECIFIED) test strip Use to test blood sugar 2 times daily or as directed. 200 strip 3    blood glucose (NO BRAND SPECIFIED) test strip Use to test blood sugar 1 times daily or as directed.  Dispense Accu-chek Olinda Plus or per patient insurance 100 strip 3    blood glucose monitoring (NO BRAND SPECIFIED) meter device kit Use to test blood sugar 2 times daily or as directed. 1 kit 0    blood glucose monitoring (SOFTCLIX) lancets USE TO TEST BLOOD SUGARS ONCE DAILY OR AS DIRECTED 100 each 0    buPROPion (WELLBUTRIN XL) 150 MG 24 hr tablet TAKE ONE TABLET BY MOUTH EVERY MORNING 90 tablet 2    busPIRone (BUSPAR) 10 MG tablet Take 1 tablet by mouth 2 times daily.      clindamycin (CLINDAMAX) 1 % external gel Apply topically 2 times daily 60 g 3    clonazePAM (KLONOPIN) 1 MG tablet Take 1 tablet by mouth 2 times daily      clotrimazole (LOTRIMIN) 1 % external cream Apply topically 2 times daily 30 g 0    eszopiclone (LUNESTA) 2 MG tablet Take 2 mg by mouth at bedtime      fenofibrate (LOFIBRA) 54 MG tablet Take 1 tablet (54 mg) by mouth daily. 90 tablet 1    fluticasone (FLONASE) 50 MCG/ACT nasal spray Spray 1 spray into both nostrils daily. 16 g 5    Fluticasone-Umeclidin-Vilant (TRELEGY ELLIPTA) 100-62.5-25 MCG/ACT oral inhaler Inhale 1 puff into the lungs daily. 3 each 3    furosemide (LASIX) 20 MG tablet TAKE 1 TABLET BY MOUTH TWICE DAILY 180 tablet 3    gabapentin (NEURONTIN) 100 MG capsule TAKE ONE  CAPSULE BY MOUTH THREE TIMES A DAY 90 capsule 4    hydrocortisone 2.5 % cream APPLY TOPICALLY TWO TIMES A DAY 30 g 0    hydrOXYzine HCl (ATARAX) 50 MG tablet TAKE TWO TABLETS BY MOUTH THREE TIMES A DAY AS NEEDED FOR ANXIETY 120 tablet 1    ibuprofen (ADVIL/MOTRIN) 200 MG tablet Take 3 tablets (600 mg) by mouth every 6 hours as needed for moderate pain (4-6)      lamoTRIgine (LAMICTAL) 200 MG tablet Take 1 tablet by mouth 2 times daily      levothyroxine (SYNTHROID/LEVOTHROID) 50 MCG tablet TAKE ONE TABLET BY MOUTH EVERY DAY 90 tablet 2    lisinopril (ZESTRIL) 10 MG tablet Take 2 tablets (20 mg) by mouth daily. 180 tablet 1    metFORMIN (GLUCOPHAGE) 500 MG tablet Take 1 tablet (500 mg) by mouth daily (with breakfast). 90 tablet 1    montelukast (SINGULAIR) 10 MG tablet TAKE ONE TABLET BY MOUTH AT BEDTIME 90 tablet 3    multivitamin, therapeutic (THERA-VIT) TABS tablet Take 1 tablet by mouth daily      nebivolol (BYSTOLIC) 5 MG tablet Take 1 tablet (5 mg) by mouth daily. Appointment needed for additional refills. Please call [phone] to schedule. 30 tablet 2    nitroGLYcerin (NITROSTAT) 0.4 MG sublingual tablet PLACE ONE TABLET UNDER THE TONGUE AT THE 1ST SIGN OF ATTACK. IF PAIN IS UNRELIEVED OR WORSENED 5 MINUTES AFTER 1ST DOSE, PROMPT MEDICAL ASSISTANCE IS NEEDED. MAY REPEAT EVERY 5 MINUTES UNTIL PAIN IS RELIEVED. MAX OF THREE DOSES 25 tablet 4    nystatin (NYAMYC) 093821 UNIT/GM external powder APPLY TO AFFECTED AREA(S) TWO TIMES A  g 0    omeprazole (PRILOSEC) 40 MG DR capsule TAKE ONE CAPSULE BY MOUTH TWICE A DAY FOR 1 MONTH THEN TAKE ONE CAPSULE EVERY DAY 60 capsule 2    ondansetron (ZOFRAN ODT) 4 MG ODT tab Take 1 tablet (4 mg) by mouth every 8 hours as needed for nausea 4 tablet 0    rosuvastatin (CRESTOR) 40 MG tablet Take 1 tablet (40 mg) by mouth daily. 90 tablet 2    semaglutide (OZEMPIC) 2 MG/3ML pen Inject 0.25 mg subcutaneously every 7 days for 28 days, THEN 0.5 mg every 7 days for 28 days. 3  "mL 1    sucralfate (CARAFATE) 1 GM tablet TAKE ONE TABLET BY MOUTH FOUR TIMES A DAY AS NEEDED FOR NAUSEA 120 tablet 0    triamcinolone (KENALOG) 0.1 % external cream Apply topically 2 times daily. 30 g 1    vitamin B-Complex Take 1 tablet by mouth daily. 90 tablet 1    vitamin B6 (PYRIDOXINE) 100 MG tablet Take 1 tablet (100 mg) by mouth daily. 90 tablet 4    Vitamin D3 (CHOLECALCIFEROL) 125 MCG (5000 UT) tablet Take 1 tablet by mouth three times a week         Allergies   Allergen Reactions    Codeine Nausea and Vomiting    Cyclobenzaprine Nausea and Vomiting    Gabapentin Rash    Hydrocodone Nausea and Vomiting    Sulfa Antibiotics Nausea and Vomiting        Social History     Tobacco Use    Smoking status: Every Day     Current packs/day: 0.30     Average packs/day: 0.3 packs/day for 30.0 years (9.0 ttl pk-yrs)     Types: Cigarettes     Passive exposure: Past    Smokeless tobacco: Never    Tobacco comments:     1-2 cigs per day   Substance Use Topics    Alcohol use: Not Currently       History   Drug Use No             Review of Systems  Constitutional, HEENT, cardiovascular, pulmonary, GI, , musculoskeletal, neuro, skin, endocrine and psych systems are negative, except as otherwise noted.    Objective    LMP 01/01/2014 (Approximate)    Estimated body mass index is 38.28 kg/m  as calculated from the following:    Height as of 2/11/25: 1.626 m (5' 4\").    Weight as of 2/11/25: 101.2 kg (223 lb).  Physical Exam  GENERAL: alert and no distress  EYES: Eyes grossly normal to inspection, PERRL and conjunctivae and sclerae normal  HENT: ear canals and TM's normal, nose and mouth without ulcers or lesions  NECK: no adenopathy, no asymmetry, masses, or scars  RESP: lungs clear to auscultation - no rales, rhonchi or wheezes  CV: regular rate and rhythm, normal S1 S2, no S3 or S4, no murmur, click or rub, no peripheral edema  ABDOMEN: soft, nontender, no hepatosplenomegaly, no masses and bowel sounds normal  MS: no gross " musculoskeletal defects noted, no edema  SKIN: no suspicious lesions or rashes  NEURO: Normal strength and tone, mentation intact and speech normal  PSYCH: mentation appears normal, affect normal/bright    Recent Labs   Lab Test 01/16/25  0949 07/10/24  1012 04/09/24  1039   HGB  --   --  14.6   PLT  --   --  292    141 141   POTASSIUM 4.2 3.8 4.1   CR 0.69 0.78 0.80   A1C 5.7* 5.7* 5.8*        Diagnostics  No labs were ordered during this visit.   EKG: appears normal, NSR, normal axis, normal intervals, no acute ST/T changes c/w ischemia, no LVH by voltage criteria, unchanged from previous tracings, old anterior infarct    Revised Cardiac Risk Index (RCRI)  The patient has the following serious cardiovascular risks for perioperative complications:   - No serious cardiac risks = 0 points     RCRI Interpretation: 0 points: Class I (very low risk - 0.4% complication rate)         Signed Electronically by: LEIGH Dixon CNP  A copy of this evaluation report is provided to the requesting physician.

## 2025-02-24 NOTE — TELEPHONE ENCOUNTER
General Call      Reason for Call:  meds question    What are your questions or concerns:  patient was told at her appt to take her beta blocker before her imaging appointment 2-. Patient is not sure which med is the beta blocker    Date of last appointment with provider: 2- for pre op     [facility] or would you prefer to receive a phone call?:   Patient would prefer a phone call     Okay to leave a detailed message?: Yes at Cell number on file:    Telephone Information:   Mobile [phone]

## 2025-02-25 ENCOUNTER — MEDICAL CORRESPONDENCE (OUTPATIENT)
Dept: HEALTH INFORMATION MANAGEMENT | Facility: CLINIC | Age: 55
End: 2025-02-25

## 2025-02-26 ENCOUNTER — ANESTHESIA EVENT (OUTPATIENT)
Dept: SURGERY | Facility: CLINIC | Age: 55
End: 2025-02-26
Payer: COMMERCIAL

## 2025-02-26 ENCOUNTER — MYC MEDICAL ADVICE (OUTPATIENT)
Dept: INTERNAL MEDICINE | Facility: CLINIC | Age: 55
End: 2025-02-26
Payer: COMMERCIAL

## 2025-02-26 NOTE — TELEPHONE ENCOUNTER
See phone note  Lis Brian RN, BSN  [facility]

## 2025-02-27 ENCOUNTER — ANESTHESIA (OUTPATIENT)
Dept: SURGERY | Facility: CLINIC | Age: 55
End: 2025-02-27
Payer: COMMERCIAL

## 2025-02-27 ENCOUNTER — HOSPITAL ENCOUNTER (OUTPATIENT)
Facility: CLINIC | Age: 55
Discharge: HOME | End: 2025-02-27
Attending: ANESTHESIOLOGY | Admitting: ANESTHESIOLOGY
Payer: COMMERCIAL

## 2025-02-27 VITALS
BODY MASS INDEX: 37.87 KG/M2 | SYSTOLIC BLOOD PRESSURE: 122 MMHG | OXYGEN SATURATION: 95 % | HEART RATE: 81 BPM | RESPIRATION RATE: 17 BRPM | TEMPERATURE: 96.6 F | WEIGHT: 221.8 LBS | HEIGHT: 64 IN | DIASTOLIC BLOOD PRESSURE: 82 MMHG

## 2025-02-27 LAB — GLUCOSE BLDC GLUCOMTR-MCNC: 119 MG/DL (ref 70–99)

## 2025-02-27 PROCEDURE — 710N000009 HC RECOVERY PHASE 1, LEVEL 1, PER MIN

## 2025-02-27 PROCEDURE — 250N000011 HC RX IP 250 OP 636: Performed by: ANESTHESIOLOGY

## 2025-02-27 PROCEDURE — 250N000009 HC RX 250: Performed by: ANESTHESIOLOGY

## 2025-02-27 PROCEDURE — 250N000013 HC RX MED GY IP 250 OP 250 PS 637: Performed by: ANESTHESIOLOGY

## 2025-02-27 PROCEDURE — 82962 GLUCOSE BLOOD TEST: CPT

## 2025-02-27 PROCEDURE — 250N000011 HC RX IP 250 OP 636: Performed by: NURSE ANESTHETIST, CERTIFIED REGISTERED

## 2025-02-27 PROCEDURE — 710N000012 HC RECOVERY PHASE 2, PER MINUTE

## 2025-02-27 PROCEDURE — 258N000003 HC RX IP 258 OP 636: Performed by: ANESTHESIOLOGY

## 2025-02-27 PROCEDURE — 999N000141 HC STATISTIC PRE-PROCEDURE NURSING ASSESSMENT

## 2025-02-27 PROCEDURE — 370N000017 HC ANESTHESIA TECHNICAL FEE, PER MIN

## 2025-02-27 PROCEDURE — 250N000025 HC SEVOFLURANE, PER MIN

## 2025-02-27 RX ORDER — LIDOCAINE 40 MG/G
CREAM TOPICAL
Status: DISCONTINUED | OUTPATIENT
Start: 2025-02-27 | End: 2025-02-27 | Stop reason: HOSPADM

## 2025-02-27 RX ORDER — SODIUM CHLORIDE, SODIUM LACTATE, POTASSIUM CHLORIDE, CALCIUM CHLORIDE 600; 310; 30; 20 MG/100ML; MG/100ML; MG/100ML; MG/100ML
INJECTION, SOLUTION INTRAVENOUS CONTINUOUS
Status: DISCONTINUED | OUTPATIENT
Start: 2025-02-27 | End: 2025-02-27 | Stop reason: HOSPADM

## 2025-02-27 RX ORDER — LIDOCAINE HYDROCHLORIDE 20 MG/ML
INJECTION, SOLUTION INFILTRATION; PERINEURAL PRN
Status: DISCONTINUED | OUTPATIENT
Start: 2025-02-27 | End: 2025-02-27

## 2025-02-27 RX ORDER — DEXAMETHASONE SODIUM PHOSPHATE 4 MG/ML
4 INJECTION, SOLUTION INTRA-ARTICULAR; INTRALESIONAL; INTRAMUSCULAR; INTRAVENOUS; SOFT TISSUE
Status: DISCONTINUED | OUTPATIENT
Start: 2025-02-27 | End: 2025-02-27 | Stop reason: HOSPADM

## 2025-02-27 RX ORDER — ACETAMINOPHEN 325 MG/1
975 TABLET ORAL
Status: DISCONTINUED | OUTPATIENT
Start: 2025-02-27 | End: 2025-02-27 | Stop reason: HOSPADM

## 2025-02-27 RX ORDER — OXYCODONE HYDROCHLORIDE 5 MG/1
5 TABLET ORAL
Status: DISCONTINUED | OUTPATIENT
Start: 2025-02-27 | End: 2025-02-27 | Stop reason: HOSPADM

## 2025-02-27 RX ORDER — NALOXONE HYDROCHLORIDE 0.4 MG/ML
0.1 INJECTION, SOLUTION INTRAMUSCULAR; INTRAVENOUS; SUBCUTANEOUS
Status: DISCONTINUED | OUTPATIENT
Start: 2025-02-27 | End: 2025-02-27 | Stop reason: HOSPADM

## 2025-02-27 RX ORDER — DEXAMETHASONE SODIUM PHOSPHATE 4 MG/ML
INJECTION, SOLUTION INTRA-ARTICULAR; INTRALESIONAL; INTRAMUSCULAR; INTRAVENOUS; SOFT TISSUE PRN
Status: DISCONTINUED | OUTPATIENT
Start: 2025-02-27 | End: 2025-02-27

## 2025-02-27 RX ORDER — MECLIZINE HYDROCHLORIDE 25 MG/1
25 TABLET ORAL ONCE
Status: COMPLETED | OUTPATIENT
Start: 2025-02-27 | End: 2025-02-27

## 2025-02-27 RX ORDER — PROPOFOL 10 MG/ML
INJECTION, EMULSION INTRAVENOUS PRN
Status: DISCONTINUED | OUTPATIENT
Start: 2025-02-27 | End: 2025-02-27

## 2025-02-27 RX ORDER — ONDANSETRON 2 MG/ML
4 INJECTION INTRAMUSCULAR; INTRAVENOUS EVERY 30 MIN PRN
Status: DISCONTINUED | OUTPATIENT
Start: 2025-02-27 | End: 2025-02-27 | Stop reason: HOSPADM

## 2025-02-27 RX ORDER — ONDANSETRON 2 MG/ML
INJECTION INTRAMUSCULAR; INTRAVENOUS PRN
Status: DISCONTINUED | OUTPATIENT
Start: 2025-02-27 | End: 2025-02-27

## 2025-02-27 RX ORDER — FENTANYL CITRATE 50 UG/ML
25 INJECTION, SOLUTION INTRAMUSCULAR; INTRAVENOUS EVERY 5 MIN PRN
Status: DISCONTINUED | OUTPATIENT
Start: 2025-02-27 | End: 2025-02-27 | Stop reason: HOSPADM

## 2025-02-27 RX ORDER — FENTANYL CITRATE 50 UG/ML
50 INJECTION, SOLUTION INTRAMUSCULAR; INTRAVENOUS EVERY 5 MIN PRN
Status: DISCONTINUED | OUTPATIENT
Start: 2025-02-27 | End: 2025-02-27 | Stop reason: HOSPADM

## 2025-02-27 RX ORDER — LABETALOL HYDROCHLORIDE 5 MG/ML
10 INJECTION, SOLUTION INTRAVENOUS
Status: DISCONTINUED | OUTPATIENT
Start: 2025-02-27 | End: 2025-02-27 | Stop reason: HOSPADM

## 2025-02-27 RX ORDER — ONDANSETRON 4 MG/1
4 TABLET, ORALLY DISINTEGRATING ORAL EVERY 30 MIN PRN
Status: DISCONTINUED | OUTPATIENT
Start: 2025-02-27 | End: 2025-02-27 | Stop reason: HOSPADM

## 2025-02-27 RX ORDER — FENTANYL CITRATE 50 UG/ML
INJECTION, SOLUTION INTRAMUSCULAR; INTRAVENOUS PRN
Status: DISCONTINUED | OUTPATIENT
Start: 2025-02-27 | End: 2025-02-27

## 2025-02-27 RX ORDER — SCOPOLAMINE 1 MG/3D
1 PATCH, EXTENDED RELEASE TRANSDERMAL
Status: DISCONTINUED | OUTPATIENT
Start: 2025-02-27 | End: 2025-02-27 | Stop reason: HOSPADM

## 2025-02-27 RX ADMIN — MECLIZINE HYDROCHLORIDE 25 MG: 25 TABLET ORAL at 06:53

## 2025-02-27 RX ADMIN — SODIUM CHLORIDE, POTASSIUM CHLORIDE, SODIUM LACTATE AND CALCIUM CHLORIDE: 600; 310; 30; 20 INJECTION, SOLUTION INTRAVENOUS at 06:53

## 2025-02-27 RX ADMIN — ONDANSETRON 4 MG: 2 INJECTION, SOLUTION INTRAMUSCULAR; INTRAVENOUS at 08:35

## 2025-02-27 RX ADMIN — ONDANSETRON 4 MG: 2 INJECTION INTRAMUSCULAR; INTRAVENOUS at 07:55

## 2025-02-27 RX ADMIN — MIDAZOLAM 1 MG: 1 INJECTION INTRAMUSCULAR; INTRAVENOUS at 07:04

## 2025-02-27 RX ADMIN — LIDOCAINE HYDROCHLORIDE 50 MG: 20 INJECTION, SOLUTION INFILTRATION; PERINEURAL at 07:55

## 2025-02-27 RX ADMIN — PROPOFOL 180 MG: 10 INJECTION, EMULSION INTRAVENOUS at 07:55

## 2025-02-27 RX ADMIN — MIDAZOLAM 2 MG: 1 INJECTION INTRAMUSCULAR; INTRAVENOUS at 07:49

## 2025-02-27 RX ADMIN — FENTANYL CITRATE 50 MCG: 50 INJECTION INTRAMUSCULAR; INTRAVENOUS at 07:55

## 2025-02-27 RX ADMIN — SCOPOLAMINE 1 PATCH: 1.5 PATCH, EXTENDED RELEASE TRANSDERMAL at 06:59

## 2025-02-27 RX ADMIN — DEXAMETHASONE SODIUM PHOSPHATE 8 MG: 4 INJECTION, SOLUTION INTRA-ARTICULAR; INTRALESIONAL; INTRAMUSCULAR; INTRAVENOUS; SOFT TISSUE at 07:55

## 2025-02-27 ASSESSMENT — ACTIVITIES OF DAILY LIVING (ADL)
ADLS_ACUITY_SCORE: 48
ADLS_ACUITY_SCORE: 49
ADLS_ACUITY_SCORE: 48

## 2025-02-27 ASSESSMENT — LIFESTYLE VARIABLES: TOBACCO_USE: 1

## 2025-02-27 ASSESSMENT — COPD QUESTIONNAIRES: COPD: 1

## 2025-02-27 NOTE — ANESTHESIA PROCEDURE NOTES
Airway       Patient location: mri.  Staff -        CRNA: Brianda Cisneros APRN CRNA       Performed By: CRNAIndications and Patient Condition       Indications for airway management: elizabeth-procedural       Induction type:intravenous       Mask difficulty assessment: 1 - vent by mask    Final Airway Details       Final airway type: supraglottic airway    Supraglottic Airway Details        Type: LMA       Brand: I-Gel       LMA size: 4    Post intubation assessment        Placement verified by: capnometry, equal breath sounds and chest rise        Number of attempts at approach: 1       Number of other approaches attempted: 0       Secured with: commercial tube tobias       Ease of procedure: easy       Dentition: Intact and Unchanged

## 2025-02-27 NOTE — ANESTHESIA PREPROCEDURE EVALUATION
Anesthesia Pre-Procedure Evaluation    Patient: Swetha Patterson   MRN: [id number] : 1970        Procedure : Procedure(s):  MRI of Brain with and without contrast          Past Medical History:   Diagnosis Date    Benign essential hypertension     Bipolar disorder (H)     Chronic abdominal pain     Chronic constipation     COPD (chronic obstructive pulmonary disease) (H)     Diabetes (H)     Gastroesophageal reflux disease     Hypothyroidism     Impaired fasting glucose     Kidney stone     Obesity (BMI 30-39.9)     PONV (postoperative nausea and vomiting)     Pre-diabetes     Pure hypercholesterolemia       Past Surgical History:   Procedure Laterality Date    ANESTHESIA OUT OF OR MRI N/A 10/07/2021    Procedure: ANESTHESIA OUT OF OR MRI;  Surgeon: GENERIC ANESTHESIA PROVIDER;  Location: [facility]    ANESTHESIA OUT OF OR MRI N/A 2023    Procedure: MRI OF NECK WITH AND WITHOUT CONTRAST;  Surgeon: GENERIC ANESTHESIA PROVIDER;  Location: [facility]    ANESTHESIA OUT OF OR MRI Left 2023    Procedure: Anesthesia out of OR MRI;  Surgeon: GENERIC ANESTHESIA PROVIDER;  Location: [facility]    ANESTHESIA OUT OF OR MRI N/A 2024    Procedure: Anesthesia out of OR MRI lumbar spine;  Surgeon: GENERIC ANESTHESIA PROVIDER;  Location: [facility] OR    APPENDECTOMY      ARTHROSCOPY SHOULDER DECOMPRESSION Left 10/21/2015    Procedure: ARTHROSCOPY SHOULDER DECOMPRESSION;  Surgeon: Julien Milian MD;  Location: [facility] OR    BIOPSY ARTERY TEMPORAL Left 2020    Procedure: LEFT TEMPORAL ARTERY BIOPSY;  Surgeon: Ibeth Conner MD;  Location: [facility] OR    BRONCHIAL THERMOPLASTY N/A 2014    Procedure: BRONCHIAL THERMOPLASTY;  Surgeon: Ward Whitaker MD;  [facility]: [facility]    BRONCHIAL THERMOPLASTY N/A 2014    Procedure: BRONCHIAL THERMOPLASTY;  Surgeon: Ward Whitaker MD;  [facility]    BRONCHIAL THERMOPLASTY N/A 2015    Procedure: BRONCHIAL THERMOPLASTY;  Surgeon: Ward Whitaker MD;   [facility]    COLONOSCOPY N/A 11/26/2018    Procedure: COLONOSCOPY ([facility]);  Surgeon: Marshall Oakes MD;  Location:  OR    COLONOSCOPY N/A 10/22/2020    Procedure: Colonoscopy;  Surgeon: Marshall Mccormick MD;  Location:  OR    COLONOSCOPY N/A 01/20/2023    Procedure: COLONOSCOPY, FLEXIBLE, WITH LESION REMOVAL USING SNARE;  Surgeon: Carson Domínguez MD;  Location:  GI    COMBINED CYSTOSCOPY, RETROGRADES, URETEROSCOPY, LASER HOLMIUM LITHOTRIPSY URETER(S), INSERT STENT Right 7/19/2023    Procedure: Cystoscopy, Right Ureteroscopy, Right Retrograde Pyelogram;  Surgeon: Sammy Herndon MD;  [place] [facility]    DISCECTOMY, FUSION CERVICAL ANTERIOR ONE LEVEL, COMBINED N/A 05/01/2018    Procedure: COMBINED DISCECTOMY, FUSION CERVICAL ANTERIOR ONE LEVEL;  1.  C5-C6 anterior cervical diskectomy and fusion.    2.  C5-C6 application of intervertebral biomechanical device for interbody fusion purposes.    3.  C5-C6 anterior instrumentation using the standard Kristin InViZia 24 mm plate with four associated bone screws, 12 mm in length.  Inferior screws are fixed.  Superior screws are va    ESOPHAGOSCOPY, GASTROSCOPY, DUODENOSCOPY (EGD), COMBINED N/A 10/22/2020    Procedure: Esophagoscopy, gastroscopy, duodenoscopy with biopsies;  Surgeon: Marshall Mccormick MD;  Location:  OR    ESOPHAGOSCOPY, GASTROSCOPY, DUODENOSCOPY (EGD), COMBINED N/A 06/17/2021    Procedure: ESOPHAGOGASTRODUODENOSCOPY, WITH BIOPSY;  Surgeon: Darrel Damon MD;  Location:  GI    EXCISE MASS NECK Left 02/01/2023    Procedure: exploration of  NECK left;  Surgeon: Ibeth Conner MD;  Location:  OR    EXCISE MASS TRUNK Left 11/03/2021    Procedure: EXCISION LEFT SHOULDER LIPOMA;  Surgeon: Ibeth Conner MD;  Location:  OR    EXCISE NODE MEDIASTINAL  04/26/2013    Procedure: EXCISE NODE MEDIASTINAL;;  Surgeon: Av Peña MD;  Location:  OR    LAPAROSCOPIC CHOLECYSTECTOMY N/A 10/19/2017     Procedure: LAPAROSCOPIC CHOLECYSTECTOMY;  LAPAROSCOPIC CHOLECYSTECTOMY;  Surgeon: Ney Jerry MD;  Location: [facility]    LARYNGOSCOPY, EXCISE VOCAL CORD LESION, COMBINED      THORACOSCOPY  04/26/2013    Procedure: THORACOSCOPY;  LEFT VIDEO ASSISTED THORACOSCOPY, RESECTION OF POSTERIOR MEDIASTINAL MASS;  Surgeon: Av Peña MD;  Location:  OR    TONSILLECTOMY & ADENOIDECTOMY        Allergies   Allergen Reactions    Codeine Nausea and Vomiting    Cyclobenzaprine Nausea and Vomiting    Gabapentin Rash    Hydrocodone Nausea and Vomiting    Sulfa Antibiotics Nausea and Vomiting      Social History     Tobacco Use    Smoking status: Every Day     Current packs/day: 0.30     Average packs/day: 0.3 packs/day for 30.0 years (9.0 ttl pk-yrs)     Types: Cigarettes     Passive exposure: Past    Smokeless tobacco: Never    Tobacco comments:     1-2 cigs per day   Substance Use Topics    Alcohol use: Not Currently      Wt Readings from Last 1 Encounters:   02/27/25 100.6 kg (221 lb 12.8 oz)        Anesthesia Evaluation   Pt has had prior anesthetic. Type: General.    History of anesthetic complications  - PONV.      ROS/MED HX  ENT/Pulmonary:     (+)           allergic rhinitis,     tobacco use, Current use,         COPD,           (-) recent URI   Neurologic: Comment: Hx dizziness and falling    (-) no CVA   Cardiovascular:     (+) Dyslipidemia hypertension- -   -  - -                          Irregular Heartbeat/Palpitations,       Previous cardiac testing   Echo: Date: 2023 Results:  Interpretation Summary     Left ventricular systolic function is normal.  The visual ejection fraction is 55-60%.  No regional wall motion abnormalities noted.  The right ventricle is normal in structure, function and size.  No hemodynamically significant valvular abnormalities on 2D or color flow  imaging. Compared to prior study, there is no significant change.    Stress Test:  Date: Results:    ECG Reviewed:  Date:  Results:    Cath:  Date: Results:      METS/Exercise Tolerance:     Hematologic:       Musculoskeletal:       GI/Hepatic:     (+) GERD, Asymptomatic on medication,                  Renal/Genitourinary:  - neg Renal ROS     Endo:     (+)          thyroid problem, hypothyroidism,    Obesity,       Psychiatric/Substance Use:     (+) psychiatric history bipolar       Infectious Disease:       Malignancy:       Other:            Physical Exam    Airway        Mallampati: III   TM distance: > 3 FB   Neck ROM: limited   Mouth opening: > 3 cm    Respiratory Devices and Support         Dental       (+) Modest Abnormalities - crowns, retainers, 1 or 2 missing teeth and Removable bridges or other hardware      Cardiovascular   cardiovascular exam normal          Pulmonary   pulmonary exam normal                OUTSIDE LABS:  CBC:   Lab Results   Component Value Date    WBC 8.1 04/09/2024    WBC 11.8 (H) 04/12/2023    HGB 14.6 04/09/2024    HGB 11.6 (L) 04/12/2023    HCT 45.0 04/09/2024    HCT 36.0 04/12/2023     04/09/2024     04/12/2023     BMP:   Lab Results   Component Value Date     01/16/2025     07/10/2024    POTASSIUM 4.2 01/16/2025    POTASSIUM 3.8 07/10/2024    CHLORIDE 106 01/16/2025    CHLORIDE 105 07/10/2024    CO2 25 01/16/2025    CO2 27 07/10/2024    BUN 18.8 01/16/2025    BUN 11.0 07/10/2024    CR 0.69 01/16/2025    CR 0.78 07/10/2024     (H) 02/27/2025     (H) 01/16/2025     COAGS:   Lab Results   Component Value Date    INR 0.99 11/20/2020     POC:   Lab Results   Component Value Date    BGM 90 11/21/2020    HCG Negative 12/23/2022    HCGS Negative 11/12/2021     HEPATIC:   Lab Results   Component Value Date    ALBUMIN 4.0 01/16/2025    PROTTOTAL 7.3 01/16/2025    ALT 34 01/16/2025    AST 28 01/16/2025    ALKPHOS 167 (H) 01/16/2025    BILITOTAL 0.3 01/16/2025     OTHER:   Lab Results   Component Value Date    PH 7.38 11/12/2021    LACT 1.8 11/12/2021    A1C 5.7 (H)  "01/16/2025    GENE 9.6 01/16/2025    MAG 1.7 10/10/2023    LIPASE 101 05/22/2021    TSH 1.87 01/16/2025    T4 0.96 08/17/2022    CRP 16.0 (H) 02/20/2020    SED 16 02/20/2020       Anesthesia Plan    ASA Status:  2    NPO Status:  NPO Appropriate    Anesthesia Type: General.     - Airway: LMA   Induction: Intravenous.   Maintenance: Balanced.   Techniques and Equipment:     Airway: Glidescope backup.       Consents    Anesthesia Plan(s) and associated risks, benefits, and realistic alternatives discussed. Questions answered and patient/representative(s) expressed understanding.     - Discussed:     - Discussed with:  Patient      - Extended Intubation/Ventilatory Support Discussed: No.      - Patient is DNR/DNI Status: No     Use of blood products discussed: No .     Postoperative Care    Pain management: Oral pain medications.   PONV prophylaxis: Ondansetron (or other 5HT-3), Dexamethasone or Solumedrol, Meclizine or Dimenhydrinate     Comments:               Gege De La Garza MD    I have reviewed the pertinent notes and labs in the chart from the past 30 days and (re)examined the patient.  Any updates or changes from those notes are reflected in this note.    Clinically Significant Risk Factors Present on Admission                   # Hypertension: Noted on problem list           # Obesity: Estimated body mass index is 38.05 kg/m  as calculated from the following:    Height as of this encounter: 1.626 m (5' 4.02\").    Weight as of this encounter: 100.6 kg (221 lb 12.8 oz).                "

## 2025-02-27 NOTE — ANESTHESIA PROCEDURE NOTES
Airway       Patient location: MRI.  Staff -        Anesthesiologist:  Gege De La Garza MD       CRNA: Brianda Cisneros APRN CRNA       Performed By: CRNA  Consent for Airway        Urgency: elective  Indications and Patient Condition       Indications for airway management: elizabeth-procedural         Mask difficulty assessment: 1 - vent by mask    Final Airway Details       Final airway type: supraglottic airway    Supraglottic Airway Details        Type: LMA       Brand: I-Gel       LMA size: 4    Post intubation assessment        Placement verified by: capnometry and chest rise        Number of attempts at approach: 1       Number of other approaches attempted: 0       Secured with: tape       Ease of procedure: easy       Dentition: Intact

## 2025-02-27 NOTE — ANESTHESIA POSTPROCEDURE EVALUATION
Patient: Swetha Patterson    Procedure: Procedure(s):  MRI of Brain with and without contrast       Anesthesia Type:  General    Note:  Disposition: Outpatient   Postop Pain Control: Uneventful            Sign Out: Well controlled pain   PONV: No   Neuro/Psych: Uneventful            Sign Out: Acceptable/Baseline neuro status   Airway/Respiratory: Uneventful            Sign Out: Acceptable/Baseline resp. status   CV/Hemodynamics: Uneventful            Sign Out: Acceptable CV status   Other NRE: NONE   DID A NON-ROUTINE EVENT OCCUR? No           Last vitals:  Vitals Value Taken Time   /79 02/27/25 0845   Temp 97.3  F (36.3  C) 02/27/25 0830   Pulse 66 02/27/25 0855   Resp 14 02/27/25 0855   SpO2 97 % 02/27/25 0854   Vitals shown include unfiled device data.    Electronically Signed By: Reddy Rosen MD  February 27, 2025  2:23 PM

## 2025-02-27 NOTE — ANESTHESIA CARE TRANSFER NOTE
Patient: Swetha Patterson    Procedure: Procedure(s):  MRI of Brain with and without contrast       Diagnosis: Abnormal gait [R26.9]  Diagnosis Additional Information: No value filed.    Anesthesia Type:   General     Note:    Oropharynx: oropharynx clear of all foreign objects  Level of Consciousness: awake  Oxygen Supplementation: face mask  Level of Supplemental Oxygen (L/min / FiO2): 8  Independent Airway: airway patency satisfactory and stable  Dentition: dentition unchanged  Vital Signs Stable: post-procedure vital signs reviewed and stable  Report to RN Given: handoff report given  Patient transferred to: PACU  Comments: Transported on cart with SW, CRNA and MISAEL BELLO from mri to pacu, vital signs/ventilation/oxygenation stable during transport.  Report given and care transferred to PACU RN  Handoff Report: Identifed the Patient, Identified the Reponsible Provider, Reviewed the pertinent medical history, Discussed the surgical course, Reviewed Intra-OP anesthesia mangement and issues during anesthesia, Set expectations for post-procedure period and Allowed opportunity for questions and acknowledgement of understanding      Vitals:  Vitals Value Taken Time   /82 02/27/25 0835   Temp 97.3  F (36.3  C) 02/27/25 0830   Pulse 69 02/27/25 0840   Resp 14 02/27/25 0840   SpO2 97 % 02/27/25 0840   Vitals shown include unfiled device data.    Electronically Signed By: LEIGH Watts CRNA  February 27, 2025  8:42 AM

## 2025-03-03 ENCOUNTER — TELEPHONE (OUTPATIENT)
Dept: INTERNAL MEDICINE | Facility: CLINIC | Age: 55
End: 2025-03-03
Payer: COMMERCIAL

## 2025-03-03 DIAGNOSIS — F41.9 ANXIETY: ICD-10-CM

## 2025-03-03 RX ORDER — HYDROXYZINE HYDROCHLORIDE 50 MG/1
TABLET, FILM COATED ORAL
Qty: 270 TABLET | Refills: 1 | Status: SHIPPED | OUTPATIENT
Start: 2025-03-03

## 2025-03-03 NOTE — PLAN OF CARE
Maral Beard, Elmira Berkowitz  [facility], [address]    LL  Read - 10:19 pm  Pt. is ready for admit. FYI - Upon arrival patient was holding chest in pain, rated 8/10, stabbing. Vitals are stable. I did an EKG again only because he said that it was worse than when he was in ED.    CO  Maral Beard - 10:23 pm  Let me sign on    LL  Read - 10:56 pm  I am still needing BiPAP orders. Do you want him NPO or are you okay with him coming off for meals? Did you want me to give him anything for the chest pain?    LUIZ Beard - 11:06 pm  Ok, he can eat, cp??? Still there?    LL  Read - 11:09 pm  Yes, still there but easing as he rests. He just rated it at a 5 for me.    CO  Maral Beard - 11:10 pm  He has Mucomyst inhaler, protonix and solumedrol    LL  Read - 11:13 pm  Okay, we will try those.   Noc RN- Pt responds to verbal stimuli, Pt unsteady/weak when up w/assist of 1. BMx2. Vital signs stable, remote tele SR w/PVC's

## 2025-03-03 NOTE — TELEPHONE ENCOUNTER
A message was sent to Dr. Low and myself from ultrasound asking about ordered SILVIA for patient. States this would only get a BP of the left arm. Ultrasound asking if a left Upper extremity arterial exam would be more appropriate.    Discussed with Dr. Low.    Ultrasound discussed with radiologist. Per ultrasound:  a Left upper extremity arterial study looks at the Left Subclavian, Axillary, Brachial, Radial and Ulnar arteries.  This sounds like the best exam for patient.     Order was entered by ultrasound and sent to Dr. Low to sign.

## 2025-03-06 ENCOUNTER — VIRTUAL VISIT (OUTPATIENT)
Dept: PHARMACY | Facility: CLINIC | Age: 55
End: 2025-03-06
Payer: COMMERCIAL

## 2025-03-06 DIAGNOSIS — F31.60 BIPOLAR AFFECTIVE DISORDER, CURRENT EPISODE MIXED, CURRENT EPISODE SEVERITY UNSPECIFIED (H): ICD-10-CM

## 2025-03-06 DIAGNOSIS — E11.9 TYPE 2 DIABETES MELLITUS WITHOUT COMPLICATION, WITHOUT LONG-TERM CURRENT USE OF INSULIN (H): ICD-10-CM

## 2025-03-06 DIAGNOSIS — I49.3 PVC'S (PREMATURE VENTRICULAR CONTRACTIONS): ICD-10-CM

## 2025-03-06 DIAGNOSIS — Z71.6 ENCOUNTER FOR SMOKING CESSATION COUNSELING: Primary | ICD-10-CM

## 2025-03-06 DIAGNOSIS — R60.9 EDEMA, UNSPECIFIED TYPE: ICD-10-CM

## 2025-03-06 DIAGNOSIS — I10 BENIGN ESSENTIAL HYPERTENSION: ICD-10-CM

## 2025-03-06 DIAGNOSIS — J44.9 CHRONIC OBSTRUCTIVE PULMONARY DISEASE, UNSPECIFIED COPD TYPE (H): ICD-10-CM

## 2025-03-06 DIAGNOSIS — R52 PAIN: ICD-10-CM

## 2025-03-06 DIAGNOSIS — Z78.9 TAKES DIETARY SUPPLEMENTS: ICD-10-CM

## 2025-03-06 DIAGNOSIS — E03.9 HYPOTHYROIDISM, UNSPECIFIED TYPE: ICD-10-CM

## 2025-03-06 DIAGNOSIS — K21.00 GASTROESOPHAGEAL REFLUX DISEASE WITH ESOPHAGITIS, UNSPECIFIED WHETHER HEMORRHAGE: ICD-10-CM

## 2025-03-06 DIAGNOSIS — L30.9 ECZEMA, UNSPECIFIED TYPE: ICD-10-CM

## 2025-03-06 DIAGNOSIS — E78.00 PURE HYPERCHOLESTEROLEMIA: ICD-10-CM

## 2025-03-06 DIAGNOSIS — J30.2 SEASONAL ALLERGIC RHINITIS, UNSPECIFIED TRIGGER: ICD-10-CM

## 2025-03-06 DIAGNOSIS — F41.9 ANXIETY: ICD-10-CM

## 2025-03-06 NOTE — PROGRESS NOTES
Medication Therapy Management (MTM) Encounter    ASSESSMENT:                            Medication Adherence/Access: No issues identified.    Smoking cessation:  Progressing, wants to continue working on this on her own.     Type 2 Diabetes: A1c well within goal <7%.   Recommended increasing Ozempic to 0.5 mg weekly for continued blood sugar control and appetite suppression.    Hypertension/Lower Extremity Edema/PVCs/Chest Pain:  blood pressure at goal <140/90 mmHg. Could try stopping amlodipine completely in the future, if needed could increase lisinopril instead. This could help with swelling, will plan to discuss at next visit.     Hyperlipidemia: Stable. LDL within goal.      Asthma/COPD/Allergies:   Will plan to evaluate effectiveness of Trelelgy once she recovers from her cold.  Encouraged smoking cessation and weight loss to help with breathing as well.     GERD/Delayed Gastric Emptying: Stable.      Bipolar Affective Disorder/Anxiety: Continue following with psychiatry.     Supplements: Stable.      Hydradenitis Suppurativa/Eczema: Defer to dermatology.      Hypothyroidism:   Stable.     Pain: Stable.    PLAN:                            Increase Ozempic to 0.5 mg a week for additional appetites suppression and blood sugar control.    Follow-up: Return in about 4 weeks (around 4/3/2025) for Medication Therapy Management, Video Visit.    SUBJECTIVE/OBJECTIVE:                          Rukhsana Patterson is a 54 year old female seen for a follow-up visit.       Reason for visit: med review.    Allergies/ADRs: Reviewed in chart  Past Medical History: Reviewed in chart  Tobacco: She reports that she has been smoking cigarettes. She has a 9 pack-year smoking history. She has been exposed to tobacco smoke. She has never used smokeless tobacco.Nicotine/Tobacco Cessation Plan  Information offered: Patient not interested at this time Would like to quit on her own.  Alcohol: not currently using    Medication Adherence/Access:  Patient uses pill box(es). Set up by a nurse once a week.  Per patient, misses medication 0 time(s) per week.   Medication barriers: none.     Diabetes   Metformin 500 mg daily  Ozempic 0.25 mg weekly,  -  started over at the lower dose increase of transitioning directly to 0.5 mg/week  Patient is not experiencing side effects. Reports that her weight has decreased slightly, but not seeing the appetite suppression she was hoping for yet.  Blood sugar monitorin-2 time(s) daily. Ranges (patient reported): Reports it was 97 mg/dL this morning     Eye exam is up to date  Foot exam is up to date  Lab Results   Component Value Date    A1C 5.7 2025    A1C 5.7 07/10/2024    A1C 5.8 2024    A1C 5.5 2022       Hypertension   Hypertension/Lower Extremity Edema/PVCs/Chest Pain:   Amlodipine 2.5 mg daily  Lisinopril 20 mg daily   Furosemide 20 mg twice daily   Nebivolol 5 mg daily  Nitroglycerin SL as needed for chest pain - last needed about 2-3 weeks ago, often will need two to control chest pain  Reports swelling has been okay, does have swelling by the end of the day. Is working on elevating her feet more.  Nurse checks blood pressure once a week 130-140/80-90's most recently.  Reports is looking for a new cardiologist, doesn't like her current cardiologist.     BP Readings from Last 3 Encounters:   25 122/82   25 118/82   25 118/68       Hyperlipidemia   Rosuvastatin 40 mg daily  Fenofibrate 54 mg daily - added after last labs  Patient reports no significant myalgias or other side effects.     Recent Labs   Lab Test 25  0949 24  1039   CHOL 228* 221*   HDL 42* 47*   * 128*   TRIG 449* 228*       Asthma   Asthma/COPD/Allergies:   Trelegy 1 puff once daily  Albuterol inhaler and nebs - reports she usually uses 1-2 times a day, reports gets out of breath when running around  Montelukast 10 mg daily   Flonase at bedtime - has been helpful, especially with her  cold  Cetirizine as needed - last visit discussed stopping this due to already taking hydroxyzine and possibly this contributing to memory, but restarted by PCP due to allergies.  Reports she does have difficult with breathing. Feels short of breath often. Reports a mix of smoking and weight gain she thinks. Breathing is a little worse right now because she has a cold. Due to her cold it is difficult to tell if Trelelgy has helped.  Patient rinses their mouth after using steroid inhaler.   Patient is experiencing the following side effects: none.  Reports no sputum production.        GERD    GERD/Delayed Gastric Emptying:   Omeprazole 40 mg daily  Ondansetron ODT 4 mg as needed - not needing often, really only if she takes meds and doesn't eat something  Sucralfate 1 g four times daily as needed - does take it scheduled  Hiatal hernia. Pt reports no current symptoms.  Patient feels that current regimen is effective.        Mental Health   Bipolar Affective Disorder/Anxiety/Insomnia   Lamotrigine 200 mg twice daily   Bupropion  mg daily   Hydroxyzine  mg three times daily as needed for anxiety - is taking three times daily   Clonazepam 1 mg twice daily as needed   Lunesta 2 mg at bedtime  Still having some memory concerns despite stopping olanzapine. Has an MRI coming up for further work up/  Reports she gets really bad anxiety. Needs both hydroxyzine and clonazepam throughout the day scheduled to control anxiety.  Reports she does have some manic days and will get very anxious.  See's a psychiatrist who is managing these. Follows up regularly.  Reports her sleep has been so-so. Reports taking Lunesta, clonazepam, and hydroxyzine helps her sleep. Reports her sleep depends on her anxiety.       Hypothyroidism   Levothyroxine 50 mcg daily.   Patient is having the following symptoms: hair loss, weight gain.      TSH   Date Value Ref Range Status   01/16/2025 1.87 0.30 - 4.20 uIU/mL Final   08/17/2022 6.29  (H) 0.40 - 4.00 mU/L Final   05/20/2021 1.48 0.40 - 4.00 mU/L Final       Supplements   Vitamin D 5000 three times weekly   Multivitamin 1 tablet daily   No concerns. History of low vitamin D         Hydradenitis Suppurativa/Eczema:   Triamcinolone twice daily as needed   Nystatin powder daily   Metrogel 0.75% twice daily   Hydrocortisone 2.5% cream as needed  Reports that the antibiotic has been somewhat helpful. Finds that nystatin powder helpful for skin folds, especially when she sweats more. Using terbinafine and clindamycin for HS symptoms on the abdomen.      Pain:  Gabapentin 100 mg three times daily   Mobic 15 mg daily with food  Acetaminophen 1000 mg as needed - not needing much since starting gabapentin.  Ibuprofen as needed - not needed in a long time  Source of pain is fibromyalgia. Reports gabapentin helps her legs a lot.    Today's Vitals: LMP 01/01/2014 (Approximate)   ----------------      I spent 20 minutes with this patient today. All changes were made via collaborative practice agreement with Roro Chawla MD.     A summary of these recommendations was sent via [employer].    Vicky Robbins, PharmD  Medication Therapy Management Pharmacist  Voicemail: [phone]    Video Visit  Joined the call at 3/6/2025, 11:01:27 am.  Left the call at 3/6/2025, 11:21:39 am.  You were on the call for 20 minutes 12 seconds     Medication Therapy Recommendations  Type 2 diabetes mellitus without complication, without long-term current use of insulin (H)   1    2 Current Medication: semaglutide (OZEMPIC, 0.25 OR 0.5 MG/DOSE,) 2 MG/3ML pen   Current Medication Sig: Inject 0.5 mg subcutaneously every 7 days.   Rationale: Dose too low - Dosage too low - Effectiveness   Recommendation: Increase Dose   Status: Accepted per CPA   Identified Date: 3/6/2025 Completed Date: 3/6/2025

## 2025-03-06 NOTE — PATIENT INSTRUCTIONS
"Recommendations from today's MTM visit:                                                       Increase Ozempic to 0.5 mg a week for additional appetites suppression and blood sugar control.    Follow-up: Return in about 4 weeks (around 4/3/2025) for Medication Therapy Management, Video Visit.    It was great speaking with you today.  I value your experience and would be very thankful for your time in providing feedback in our clinic survey. In the next few days, you may receive an email or text message from [employer] with a link to a survey related to your  clinical pharmacist.\"     To schedule another MTM appointment, please call the clinic directly or you may call the MTM scheduling line at [phone].    My Clinical Pharmacist's contact information:                                                      Please feel free to contact me with any questions or concerns you have.      Vicky Robbins, PharmD  Medication Therapy Management Pharmacist  Voicemail: [phone]     "

## 2025-03-12 DIAGNOSIS — K21.9 GASTROESOPHAGEAL REFLUX DISEASE WITHOUT ESOPHAGITIS: ICD-10-CM

## 2025-03-12 RX ORDER — OMEPRAZOLE 40 MG/1
CAPSULE, DELAYED RELEASE ORAL
Qty: 60 CAPSULE | Refills: 2 | Status: SHIPPED | OUTPATIENT
Start: 2025-03-12

## 2025-03-17 DIAGNOSIS — K21.9 GASTROESOPHAGEAL REFLUX DISEASE WITHOUT ESOPHAGITIS: ICD-10-CM

## 2025-03-17 RX ORDER — OMEPRAZOLE 40 MG/1
CAPSULE, DELAYED RELEASE ORAL
Qty: 60 CAPSULE | Refills: 2 | OUTPATIENT
Start: 2025-03-17

## 2025-03-19 ENCOUNTER — NURSE TRIAGE (OUTPATIENT)
Dept: INTERNAL MEDICINE | Facility: CLINIC | Age: 55
End: 2025-03-19
Payer: COMMERCIAL

## 2025-03-19 NOTE — TELEPHONE ENCOUNTER
Test Results        Who ordered the test:  PCP    Type of test: Lab and MRI    Date of test:  2/27/2025    Where was the test performed:  N/A    What are your questions/concerns?:  Patient is wanting to discuss results from MRI     [facility] or would you prefer to receive a phone call?:   Patient would prefer a phone call   Okay to leave a detailed message?: Yes at Cell number on file:    Telephone Information:   Mobile [phone]

## 2025-03-19 NOTE — TELEPHONE ENCOUNTER
"Per provider's result note from 02/28/2025 from MRI: \"No sign of acute strokes, no tumors  There is a small abnormality on the right frontal lobe, but there are no changes compared with 2018\"    Call to patient to see if she had any particular questions. Patient wants to know what \"small abnormality means?\" Patient wondering if she needs to be concerned. Per radiology note it reads \"Nonspecific T2 hyperintensities in the right frontal lobe appear unchanged since 7/30/2018\". Writer unable to interpret this.    Please clarify.    Thank you,  Benedict, Triage RN [place]    3:31 PM 3/19/2025         "

## 2025-03-21 NOTE — TELEPHONE ENCOUNTER
"Patient calls back again for update, she is wanting clarification on what \"small abnormality on the right frontal lobe\" and \"Nonspecific T2 hyperintensities in the right frontal lobe \" is in reference to.     Summer RN 4:19 PM March 21, 2025   [facility]    "

## 2025-03-24 NOTE — TELEPHONE ENCOUNTER
"Nurse Triage SBAR    Is this a 2nd Level Triage? NO    Situation: Difficulty finding words, stuttering and memory problems onset about 2 weeks ago and getting worse per patient. Denies focal weakness, numbness, tingling, balance problems, vision changes.   Background: History of COPD, hypertension, hypercholesterolemia, hypothyroid, impaired fasting glucose, Bipolar disorder  Assessment: New and significant changes in speech and memory worrisome for stroke/TIA.      Protocol Recommended Disposition:   Call  Now--Patient refuses 911 presently. Advised of importance of being assessed in ED for her symptoms.  Patient states she will think about 911 and may call someone to give her a ride to ED.      Recommendation: Call 911 for transport to hospital ED.    Routed to provider    Does the patient meet one of the following criteria for ADS visit consideration? 16+ years old, with an [facility] PCP     TIP  Providers, please consider if this condition is appropriate for management at one of our Acute and Diagnostic Services sites.     If patient is a good candidate, please use dotphrase <dot>triageresponse and select Refer to ADS to document.  Reason for Disposition   Sudden onset of loss of speech or garbled speech and present now    Answer Assessment - Initial Assessment Questions  1. SYMPTOM: \"What is the main symptom you are concerned about?\" (e.g., weakness, numbness)      Stuttering, difficulty with word finding and poor memory.  2. ONSET: \"When did this start?\" (minutes, hours, days; while sleeping)      2 weeks ago.  3. LAST NORMAL: \"When was the last time you (the patient) were normal (no symptoms)?\"      2 weeks ago  4. PATTERN \"Does this come and go, or has it been constant since it started?\"  \"Is it present now?\"       Symptoms come and go but are becoming more frequent. Yes, present now  5. CARDIAC SYMPTOMS: \"Have you had any of the following symptoms: chest pain, difficulty breathing, palpitations?\"      No " "change from baseline breathing  6. NEUROLOGIC SYMPTOMS: \"Have you had any of the following symptoms: headache, dizziness, vision loss, double vision, changes in speech, unsteady on your feet?\"      Changes in speech  7. OTHER SYMPTOMS: \"Do you have any other symptoms?\"      Memory difficulty is quite bothersome to her.  She loses her train of thought during conversation.  8. PREGNANCY: \"Is there any chance you are pregnant?\" \"When was your last menstrual period?\"      NA    Protocols used: Neurologic Deficit-A-[place]    "

## 2025-03-24 NOTE — TELEPHONE ENCOUNTER
I don't know have more explanations but I am willing to send a neurology referral   The important thing there is no change with 2018

## 2025-03-25 NOTE — TELEPHONE ENCOUNTER
Attempt #1    Unable to leave voicemail    Summer RN 7:58 AM March 25, 2025   [facility]

## 2025-03-25 NOTE — TELEPHONE ENCOUNTER
Routing to PCP as FYI:     Advised patient of provider recommendation via telephone. Patient reports that she cannot go now as her ASHLI is in surgery right now and they do not know if he will survive surgery.     Writer strongly encouraged to go to ER ASAP as symptoms could be a sign of stroke or TIA and would need imaging. Patient verbalized understanding. She will go, but unlikely to day as she wants to support her family.      Summer RN 10:37 AM March 25, 2025   [facility]

## 2025-03-30 DIAGNOSIS — R10.13 EPIGASTRIC PAIN: ICD-10-CM

## 2025-03-31 ENCOUNTER — NURSE TRIAGE (OUTPATIENT)
Dept: NURSING | Facility: CLINIC | Age: 55
End: 2025-03-31
Payer: COMMERCIAL

## 2025-03-31 RX ORDER — SUCRALFATE 1 G/1
TABLET ORAL
Qty: 120 TABLET | Refills: 0 | Status: SHIPPED | OUTPATIENT
Start: 2025-03-31

## 2025-04-01 ENCOUNTER — TELEPHONE (OUTPATIENT)
Dept: INTERNAL MEDICINE | Facility: CLINIC | Age: 55
End: 2025-04-01
Payer: COMMERCIAL

## 2025-04-01 NOTE — TELEPHONE ENCOUNTER
See message below. FYI for Dr Low     Attempted to call pt. LM to call clinic regarding her request for Neurology referral information.   Dr Low placed referral in Dec 2024.     When pt calls back, please provide pt the phone # to call.        [facility]   [address] [place] [address] [place] [id number]   Phone: [phone]   Fax: [phone]

## 2025-04-01 NOTE — TELEPHONE ENCOUNTER
"Nurse Triage SBAR    Is this a 2nd Level Triage? YES, LICENSED PRACTITIONER REVIEW IS REQUIRED    Situation: Pt calling to report severe anxiety and also that she accidentally took a double dose of her Ozempic today     Background: Bipolar disorder, Impaired fasting glucose. Pt reports some diagnosis not listed in her chart: Fibromyalgia, Emphysema, COPD    Assessment:   Pt reports that every day her Anxiety is high, she stutters, and she is forgetful. Her muscles are tense and painful. Her legs swell at night.     Forgetfulness: Could not get money out of the cash machine today and forgot that she had already gone to the cash machine. Her sister reminded her.   Concerned that she is getting Alzheimer's disease. She thinks her PCP placed a referral to Neurology, but she is not for certain.     Anxiety and pain coping strategies she tries:   Rocking back and forth because her legs, back and hips hurt  I \"try to concentrate on one thing\"   Took her Klonopin and Buspar tonight    Double dose:   Forgot that she took her Ozempic earlier and took it again   Taking 1.5 now and took 1.5mg earlier today. So she took 3mg total today   Current blood glucose: 186; Higher than usual   Baseline usually 115 at this time of day   Denies any adverse sx at this time.   Writer gave patient the number for poison control and also consulted on call provider.     Pain:   \"My body hurts\" and is \"tense\"  \"I am in pain from my muscles\"   \"I have fallen because my legs hurt so bad\" and \"my feet get swollen at night\"   She took 3 tables of Ibuprofen this evening    Protocol Recommended Disposition:   Go to ED Now (Or PCP Triage), Call PCP Now    Recommendation: See below. Also forwarding to PCP clinic pool to follow up on neurology referral patient dicussed during the call      Paged to provider    10:19PM   [facility] Provider consult indicated.  Reason for page: 3nd level triage  Specialty Group number: [id number]   Specialty Group: " "[facility]- [facility]  Initial page sent to Dr. Knight by RN at 10:19pm.   Marcie Montana RN    [facility] Provider, Dr. Knight, returning page to Nurse Advisors at 10:23pm  Provider recommended plan of care: Should be OK with extra dose of Ozemic. She might get more nausea and vomiting from it   Anxiety: If she get worse or not able to calm down within 1 hour then she should be evaluated in the ED and another person should drive and another person is recommended to be with her at home right now while she is anxious    Provider Recommendation Follow Up:   Reached patient/caregiver. Informed of provider's recommendations. Patient verbalized understanding and does not agree with the plan. Pt states she is not going to the ED because \"they are not going to do anything for her\".     Marcie Montana RN    Note to clinic: Writer is forwarding this message to the clinic pool to advise patient on her Neurology referral as writer was unable to locate a referral and patient mentioned that her PCP recommended neurology       Reason for Disposition   [1] DOUBLE DOSE (an extra dose or lesser amount) of prescription drug AND [2] NO symptoms  (Exception: A double dose of antibiotics.)   [1] SEVERE anxiety (e.g., extremely anxious with intense emotional symptoms such as feeling of unreality, urge to flee, unable to calm down; unable to cope or function) AND [2] not better after 10 minutes of reassurance and Care Advice    Additional Information   Negative: [1] DOUBLE DOSE (an extra dose or lesser amount) of prescription drug AND [2] any symptoms (e.g., dizziness, nausea, pain, sleepiness)   Negative: MORE THAN A DOUBLE DOSE of a prescription or over-the-counter (OTC) drug   Negative: [1] DOUBLE DOSE (an extra dose or lesser amount) of over-the-counter (OTC) drug AND [2] any symptoms (e.g., dizziness, nausea, pain, sleepiness)   Negative: Took another person's prescription drug   Negative: SEVERE difficulty breathing (e.g., " struggling for each breath, speaks in single words)   Negative: Difficult to awaken or acting confused (e.g., disoriented, slurred speech)   Negative: Bluish (or gray) lips or face now   Negative: Violent behavior, or threatening to physically hurt or kill someone   Negative: Sounds like a life-threatening emergency to the triager   Negative: Suicide thoughts, threats, attempts, or questions   Negative: Chest pain   Negative: Palpitations, skipped heartbeat, or rapid heartbeat is main symptom   Negative: [1] Difficulty breathing AND [2] persists > 10 minutes AND [3] not relieved by reassurance provided by triager   Negative: [1] Lightheadedness or dizziness AND [2] persists > 10 minutes AND [3] not relieved by reassurance provided by triager    Protocols used: Medication Question Call-A-, Anxiety and Panic Attack-A-  Marcie Montana RN   Triage Nurse Advisor on 3/31/2025 at 10:55 PM

## 2025-04-01 NOTE — TELEPHONE ENCOUNTER
Patient called back, gave patient info below.         Duplicate encounter-  unable to document in triage 3/31/2025.      See message below. FYI for Dr Lwo      Attempted to call pt. LM to call clinic regarding her request for Neurology referral information.   Dr Low placed referral in Dec 2024.      When pt calls back, please provide pt the phone # to call.         [facility]   [address] [address]   Phone: [phone]   Fax: [phone]

## 2025-04-03 ENCOUNTER — DOCUMENTATION ONLY (OUTPATIENT)
Dept: LAB | Facility: CLINIC | Age: 55
End: 2025-04-03

## 2025-04-03 NOTE — PROGRESS NOTES
Swetha A Leonardo has an upcoming lab appointment:    Future Appointments   Date Time Provider Department Center   4/8/2025  9:30 AM [facility]   4/11/2025  1:30 PM RSCCUS2 [facility]SCUS [facility]   4/15/2025 10:00 AM Roro Chawla MD [facility]   5/13/2025  9:15 AM [facility]     Patient is coming in for labs on 4/8. Please change expected date on the orders.  Thanks  [facility] Due   Topic    ANNUAL REVIEW OF  ORDERS     MICROALBUMIN      Sanjay Hernandez

## 2025-04-07 ENCOUNTER — TELEPHONE (OUTPATIENT)
Dept: INTERNAL MEDICINE | Facility: CLINIC | Age: 55
End: 2025-04-07
Payer: COMMERCIAL

## 2025-04-07 DIAGNOSIS — E11.9 TYPE 2 DIABETES MELLITUS WITHOUT COMPLICATION, WITHOUT LONG-TERM CURRENT USE OF INSULIN (H): ICD-10-CM

## 2025-04-07 NOTE — TELEPHONE ENCOUNTER
Patient requesting refill for Ozempic. Patient requesting to increase to the next dose (she believes this is 1 mg).

## 2025-04-08 ENCOUNTER — TELEPHONE (OUTPATIENT)
Dept: INTERNAL MEDICINE | Facility: CLINIC | Age: 55
End: 2025-04-08

## 2025-04-08 NOTE — TELEPHONE ENCOUNTER
Prior Authorization Retail Medication Request    Medication/Dose:  Semaglutide, 1 MG/DOSE, (OZEMPIC) 4 MG/3ML pen  Diagnosis and ICD code (if different than what is on RX):    New/renewal/insurance change PA/secondary ins. PA:  Previously Tried and Failed:    Rationale:  Type 2 diabetes mellitus without complication, without long-term current use of insulin (H)     Insurance   Primary: [employer]  Insurance ID:  [id number]      Secondary (if applicable):[employer] [facility] MED[employer] REPLMT   Insurance ID:    [id number]     Pharmacy Information (if different than what is on RX)  Name:    Phone:   Fax:    Clinic Information  Preferred routing pool for dept communication: Ramos RN team

## 2025-04-08 NOTE — TELEPHONE ENCOUNTER
Mey, home care RN for patient calls to verify Ozempic dose. Relayed to patient and home care nurse that provider sent updated prescription for 1mg this morning to [facility]. Patient says the med needs a prior authorization. Advised patient that prior authorization will be started. Started prior authorization in separate telephone encounter.    Thank you,  Benedict, Triage RN Svetlana Samoa    12:35 PM 4/8/2025

## 2025-04-10 NOTE — TELEPHONE ENCOUNTER
Retail Pharmacy Prior Authorization Team   Phone: [phone]    PA Initiation    Medication: OZEMPIC (1 MG/DOSE) 4 MG/3ML SC SOPN  Insurance Company: [employer] ([employer]) - Phone [phone] Fax [phone]  Pharmacy Filling the Rx: [facility], [place] - [place], [place] - [address] [address]  Filling Pharmacy Phone: [phone]  Filling Pharmacy Fax:    Start Date: 4/10/2025    Started PA on CMM and a response of This request has been approved using information available on the patient's profile. CaseId:[id number];

## 2025-04-10 NOTE — TELEPHONE ENCOUNTER
Prior Authorization Approval    Authorization Effective Date: 3/11/2025  Authorization Expiration Date: 4/10/2026  Medication: Semaglutide, 1 MG/DOSE, (OZEMPIC) 4 MG/3ML pen-APPROVED  Reference #:     Insurance Company: [employer] ([employer]) - Phone [phone] Fax [phone]  Which Pharmacy is filling the prescription (Not needed for infusion/clinic administered): [facility], [place] - [place], [place] - [address] [place] [address]  Pharmacy Notified: Yes  Patient Notified: Instructed pharmacy to notify patient when script is ready to /ship.

## 2025-04-14 ENCOUNTER — TELEPHONE (OUTPATIENT)
Dept: INTERNAL MEDICINE | Facility: CLINIC | Age: 55
End: 2025-04-14

## 2025-04-14 DIAGNOSIS — I49.3 PVC'S (PREMATURE VENTRICULAR CONTRACTIONS): ICD-10-CM

## 2025-04-14 RX ORDER — NEBIVOLOL 5 MG/1
5 TABLET ORAL DAILY
Qty: 30 TABLET | Refills: 2 | OUTPATIENT
Start: 2025-04-14

## 2025-04-15 ENCOUNTER — OFFICE VISIT (OUTPATIENT)
Dept: INTERNAL MEDICINE | Facility: CLINIC | Age: 55
End: 2025-04-15
Payer: COMMERCIAL

## 2025-04-15 VITALS
OXYGEN SATURATION: 95 % | TEMPERATURE: 97.1 F | RESPIRATION RATE: 18 BRPM | SYSTOLIC BLOOD PRESSURE: 118 MMHG | HEART RATE: 91 BPM | BODY MASS INDEX: 36.79 KG/M2 | WEIGHT: 215.5 LBS | DIASTOLIC BLOOD PRESSURE: 78 MMHG | HEIGHT: 64 IN

## 2025-04-15 DIAGNOSIS — Z00.00 ROUTINE GENERAL MEDICAL EXAMINATION AT A HEALTH CARE FACILITY: Primary | ICD-10-CM

## 2025-04-15 DIAGNOSIS — E53.1 VITAMIN B6 DEFICIENCY: ICD-10-CM

## 2025-04-15 DIAGNOSIS — E11.9 TYPE 2 DIABETES MELLITUS WITHOUT COMPLICATION, WITHOUT LONG-TERM CURRENT USE OF INSULIN (H): ICD-10-CM

## 2025-04-15 DIAGNOSIS — Z12.31 VISIT FOR SCREENING MAMMOGRAM: ICD-10-CM

## 2025-04-15 DIAGNOSIS — E78.5 HYPERLIPIDEMIA LDL GOAL <130: Chronic | ICD-10-CM

## 2025-04-15 DIAGNOSIS — Z12.4 CERVICAL CANCER SCREENING: ICD-10-CM

## 2025-04-15 DIAGNOSIS — E03.9 HYPOTHYROIDISM, UNSPECIFIED TYPE: ICD-10-CM

## 2025-04-15 DIAGNOSIS — I10 HTN, GOAL BELOW 140/90: ICD-10-CM

## 2025-04-15 DIAGNOSIS — R10.13 EPIGASTRIC PAIN: ICD-10-CM

## 2025-04-15 DIAGNOSIS — Z23 NEED FOR SHINGLES VACCINE: ICD-10-CM

## 2025-04-15 DIAGNOSIS — E55.9 VITAMIN D DEFICIENCY: ICD-10-CM

## 2025-04-15 DIAGNOSIS — M79.7 FIBROMYALGIA: ICD-10-CM

## 2025-04-15 LAB
ERYTHROCYTE [DISTWIDTH] IN BLOOD BY AUTOMATED COUNT: 14.7 % (ref 10–15)
EST. AVERAGE GLUCOSE BLD GHB EST-MCNC: 120 MG/DL
HBA1C MFR BLD: 5.8 % (ref 0–5.6)
HCT VFR BLD AUTO: 42.5 % (ref 35–47)
HGB BLD-MCNC: 14.4 G/DL (ref 11.7–15.7)
MCH RBC QN AUTO: 28.5 PG (ref 26.5–33)
MCHC RBC AUTO-ENTMCNC: 33.9 G/DL (ref 31.5–36.5)
MCV RBC AUTO: 84 FL (ref 78–100)
PLATELET # BLD AUTO: 371 10E3/UL (ref 150–450)
RBC # BLD AUTO: 5.05 10E6/UL (ref 3.8–5.2)
WBC # BLD AUTO: 7.8 10E3/UL (ref 4–11)

## 2025-04-15 PROCEDURE — 85027 COMPLETE CBC AUTOMATED: CPT | Performed by: INTERNAL MEDICINE

## 2025-04-15 PROCEDURE — 82043 UR ALBUMIN QUANTITATIVE: CPT | Performed by: INTERNAL MEDICINE

## 2025-04-15 PROCEDURE — 87624 HPV HI-RISK TYP POOLED RSLT: CPT | Performed by: INTERNAL MEDICINE

## 2025-04-15 PROCEDURE — 82570 ASSAY OF URINE CREATININE: CPT | Performed by: INTERNAL MEDICINE

## 2025-04-15 PROCEDURE — 80061 LIPID PANEL: CPT | Performed by: INTERNAL MEDICINE

## 2025-04-15 PROCEDURE — 3078F DIAST BP <80 MM HG: CPT | Performed by: INTERNAL MEDICINE

## 2025-04-15 PROCEDURE — 82306 VITAMIN D 25 HYDROXY: CPT | Performed by: INTERNAL MEDICINE

## 2025-04-15 PROCEDURE — 84443 ASSAY THYROID STIM HORMONE: CPT | Performed by: INTERNAL MEDICINE

## 2025-04-15 PROCEDURE — 80053 COMPREHEN METABOLIC PANEL: CPT | Performed by: INTERNAL MEDICINE

## 2025-04-15 PROCEDURE — 83036 HEMOGLOBIN GLYCOSYLATED A1C: CPT | Performed by: INTERNAL MEDICINE

## 2025-04-15 PROCEDURE — 36415 COLL VENOUS BLD VENIPUNCTURE: CPT | Performed by: INTERNAL MEDICINE

## 2025-04-15 PROCEDURE — 99396 PREV VISIT EST AGE 40-64: CPT | Performed by: INTERNAL MEDICINE

## 2025-04-15 PROCEDURE — 3074F SYST BP LT 130 MM HG: CPT | Performed by: INTERNAL MEDICINE

## 2025-04-15 SDOH — HEALTH STABILITY: PHYSICAL HEALTH: ON AVERAGE, HOW MANY DAYS PER WEEK DO YOU ENGAGE IN MODERATE TO STRENUOUS EXERCISE (LIKE A BRISK WALK)?: 3 DAYS

## 2025-04-15 ASSESSMENT — SOCIAL DETERMINANTS OF HEALTH (SDOH): HOW OFTEN DO YOU GET TOGETHER WITH FRIENDS OR RELATIVES?: TWICE A WEEK

## 2025-04-15 ASSESSMENT — ANXIETY QUESTIONNAIRES
GAD7 TOTAL SCORE: 20
1. FEELING NERVOUS, ANXIOUS, OR ON EDGE: NEARLY EVERY DAY
5. BEING SO RESTLESS THAT IT IS HARD TO SIT STILL: NEARLY EVERY DAY
7. FEELING AFRAID AS IF SOMETHING AWFUL MIGHT HAPPEN: MORE THAN HALF THE DAYS
IF YOU CHECKED OFF ANY PROBLEMS ON THIS QUESTIONNAIRE, HOW DIFFICULT HAVE THESE PROBLEMS MADE IT FOR YOU TO DO YOUR WORK, TAKE CARE OF THINGS AT HOME, OR GET ALONG WITH OTHER PEOPLE: SOMEWHAT DIFFICULT
3. WORRYING TOO MUCH ABOUT DIFFERENT THINGS: NEARLY EVERY DAY
8. IF YOU CHECKED OFF ANY PROBLEMS, HOW DIFFICULT HAVE THESE MADE IT FOR YOU TO DO YOUR WORK, TAKE CARE OF THINGS AT HOME, OR GET ALONG WITH OTHER PEOPLE?: SOMEWHAT DIFFICULT
6. BECOMING EASILY ANNOYED OR IRRITABLE: NEARLY EVERY DAY
GAD7 TOTAL SCORE: 20
2. NOT BEING ABLE TO STOP OR CONTROL WORRYING: NEARLY EVERY DAY
4. TROUBLE RELAXING: NEARLY EVERY DAY
7. FEELING AFRAID AS IF SOMETHING AWFUL MIGHT HAPPEN: MORE THAN HALF THE DAYS
GAD7 TOTAL SCORE: 20

## 2025-04-15 ASSESSMENT — PATIENT HEALTH QUESTIONNAIRE - PHQ9
SUM OF ALL RESPONSES TO PHQ QUESTIONS 1-9: 16
SUM OF ALL RESPONSES TO PHQ QUESTIONS 1-9: 16
10. IF YOU CHECKED OFF ANY PROBLEMS, HOW DIFFICULT HAVE THESE PROBLEMS MADE IT FOR YOU TO DO YOUR WORK, TAKE CARE OF THINGS AT HOME, OR GET ALONG WITH OTHER PEOPLE: SOMEWHAT DIFFICULT

## 2025-04-15 NOTE — PROGRESS NOTES
Dr Low's note    Patient's instructions / PLAN:                                                        Plan:   Labs today - [address]   2. Ask the eye doctor to send report to this chart: [phone]  3. Mammogram ( please call [phone] to schedule it)   4. Continue same meds, same doses for now   5. Schedule a follow up appointment in 4-5 months  6. The following vaccines are recommended for you. Please check with your insurance about coverage.  Some insurances cover better if you have these vaccines at the pharmacy:  -- f/up / Covid / RSV  -- Pneumonia 20  -- Tetanus vaccine -- Td  -- Shingrix vaccine - second dose  4. Ask the Home Health Nurse to help you scheduling neurology appointment with one of these departments    [facility]  [address]  [facility] [address]  Phone: [phone]  Fax: [phone]    Or [facility]    [address] [address]  Phone: [phone]        ASSESSMENT & PLAN:                                                      (Z00.00) Routine general medical examination at a health care facility  (primary encounter diagnosis)  Comment:   Plan: CBC with platelets, Comprehensive metabolic         panel, Lipid panel reflex to direct LDL         Fasting, TSH with free T4 reflex, Albumin         Random Urine Quantitative with Creat Ratio,         Hemoglobin A1c, Vitamin D Deficiency            (Z23) Need for shingles vaccine  Comment:   Plan:     (Z12.31) Visit for screening mammogram  Comment:   Plan: MA Screening Bilateral w/ Benigno            (Z12.4) Cervical cancer screening  Comment:   Plan: HPV and Gynecologic Cytology Panel -         Recommended Age 30 - 65 Years            (E11.9) Type 2 diabetes mellitus without complication, without long-term current use of insulin (H)  Comment: Controlled  Plan: CBC with platelets, Comprehensive metabolic         panel, Lipid panel reflex to direct  LDL         Fasting, TSH with free T4 reflex, Albumin         Random Urine Quantitative with Creat Ratio,         Hemoglobin A1c            (E53.1) Vitamin B6 deficiency  Comment:   Plan: Continue with the supplements    (E55.9) Vitamin D deficiency  Comment:   Plan: Vitamin D Deficiency            (M79.7) Fibromyalgia  Comment:   Plan: Continue gabapentin    (E03.9) Hypothyroidism, unspecified type  Comment: Controlled based on prior numbers  Plan: TSH, continue same dose levothyroxine    (I10) HTN, goal below 140/90  Comment: Controlled    Plan: Continue same meds, same doses for now     (E78.5) Hyperlipidemia LDL goal <130  Comment: Controlled    Plan: Continue same meds, same doses for now     (R10.13) Epigastric pain  Comment: Controlled    Plan: Continue same meds, same doses for now        Chief Complaint:                                                        Annual exam  Follow up chronic medical problems      SUBJECTIVE:                                                    History of present illness     We reviewed the chronic medical problems as above.   I reviewed the recent tests results in Epic     Lung cancer screening  Nov 2024 CT for Ao: Lungs: No nodules, masses, or infiltrates.     Takes vit B cx, vit D    Still problems with memory and stuttering Neuro ref sent in Dec. She is struggling to get appointment       ROS:                                                      ROS: negative for fever, chills, cough, wheezes, chest pain, shortness of breath, vomiting, abdominal pain, leg swelling     PMHx: - reviewed  Past Medical History:   Diagnosis Date    Benign essential hypertension     Bipolar disorder (H)     Chronic abdominal pain     Chronic constipation     COPD (chronic obstructive pulmonary disease) (H)     Diabetes (H)     Gastroesophageal reflux disease     Hypothyroidism     Impaired fasting glucose     Kidney stone     Obesity (BMI 30-39.9)     PONV (postoperative nausea and vomiting)      Pre-diabetes     Pure hypercholesterolemia        PSHx: reviewed  Past Surgical History:   Procedure Laterality Date    ANESTHESIA OUT OF OR MRI N/A 10/07/2021    Procedure: ANESTHESIA OUT OF OR MRI;  Surgeon: GENERIC ANESTHESIA PROVIDER;  Location: [facility] OR    ANESTHESIA OUT OF OR MRI N/A 5/11/2023    Procedure: MRI OF NECK WITH AND WITHOUT CONTRAST;  Surgeon: GENERIC ANESTHESIA PROVIDER;  Location: [facility] OR    ANESTHESIA OUT OF OR MRI Left 6/26/2023    Procedure: Anesthesia out of OR MRI;  Surgeon: GENERIC ANESTHESIA PROVIDER;  Location: [facility] OR    ANESTHESIA OUT OF OR MRI N/A 8/22/2024    Procedure: Anesthesia out of OR MRI lumbar spine;  Surgeon: GENERIC ANESTHESIA PROVIDER;  Location: [facility] OR    ANESTHESIA OUT OF OR MRI N/A 2/27/2025    Procedure: MRI of Brain with and without contrast;  Surgeon: GENERIC ANESTHESIA PROVIDER;  Location: [facility] OR    APPENDECTOMY      ARTHROSCOPY SHOULDER DECOMPRESSION Left 10/21/2015    Procedure: ARTHROSCOPY SHOULDER DECOMPRESSION;  Surgeon: Julien Milian MD;  Location: [facility] OR    BIOPSY ARTERY TEMPORAL Left 03/11/2020    Procedure: LEFT TEMPORAL ARTERY BIOPSY;  Surgeon: Ibeth Conner MD;  Location: [facility] OR    BRONCHIAL THERMOPLASTY N/A 11/14/2014    Procedure: BRONCHIAL THERMOPLASTY;  Surgeon: Ward Whitaker MD;  [facility]: [facility]    BRONCHIAL THERMOPLASTY N/A 12/19/2014    Procedure: BRONCHIAL THERMOPLASTY;  Surgeon: Ward Whitaker MD;  [facility]    BRONCHIAL THERMOPLASTY N/A 02/06/2015    Procedure: BRONCHIAL THERMOPLASTY;  Surgeon: Ward Whitaker MD;  [facility]    COLONOSCOPY N/A 11/26/2018    Procedure: COLONOSCOPY ([facility]);  Surgeon: Marshall Oakes MD;  Location:  OR    COLONOSCOPY N/A 10/22/2020    Procedure: Colonoscopy;  Surgeon: Marshall Mccormick MD;  Location: [facility] OR    COLONOSCOPY N/A 01/20/2023    Procedure: COLONOSCOPY, FLEXIBLE, WITH LESION REMOVAL USING SNARE;  Surgeon: Carson Domínguez MD;  Location:  GI    COMBINED  CYSTOSCOPY, RETROGRADES, URETEROSCOPY, LASER HOLMIUM LITHOTRIPSY URETER(S), INSERT STENT Right 7/19/2023    Procedure: Cystoscopy, Right Ureteroscopy, Right Retrograde Pyelogram;  Surgeon: Sammy Herndon MD;  Location: [facility] OR    DISCECTOMY, FUSION CERVICAL ANTERIOR ONE LEVEL, COMBINED N/A 05/01/2018    Procedure: COMBINED DISCECTOMY, FUSION CERVICAL ANTERIOR ONE LEVEL;  1.  C5-C6 anterior cervical diskectomy and fusion.    2.  C5-C6 application of intervertebral biomechanical device for interbody fusion purposes.    3.  C5-C6 anterior instrumentation using the standard Kristin InViZia 24 mm plate with four associated bone screws, 12 mm in length.  Inferior screws are fixed.  Superior screws are va    ESOPHAGOSCOPY, GASTROSCOPY, DUODENOSCOPY (EGD), COMBINED N/A 10/22/2020    Procedure: Esophagoscopy, gastroscopy, duodenoscopy with biopsies;  Surgeon: Marshall Mccormick MD;  Location:  OR    ESOPHAGOSCOPY, GASTROSCOPY, DUODENOSCOPY (EGD), COMBINED N/A 06/17/2021    Procedure: ESOPHAGOGASTRODUODENOSCOPY, WITH BIOPSY;  Surgeon: Darrel Damon MD;  Location:  GI    EXCISE MASS NECK Left 02/01/2023    Procedure: exploration of  NECK left;  Surgeon: Ibeth Conner MD;  Location:  OR    EXCISE MASS TRUNK Left 11/03/2021    Procedure: EXCISION LEFT SHOULDER LIPOMA;  Surgeon: Ibeth Conner MD;  Location:  OR    EXCISE NODE MEDIASTINAL  04/26/2013    Procedure: EXCISE NODE MEDIASTINAL;;  Surgeon: Av Peña MD;  Location:  OR    LAPAROSCOPIC CHOLECYSTECTOMY N/A 10/19/2017    Procedure: LAPAROSCOPIC CHOLECYSTECTOMY;  LAPAROSCOPIC CHOLECYSTECTOMY;  Surgeon: Ney Jerry MD;  Location:  OR    LARYNGOSCOPY, EXCISE VOCAL CORD LESION, COMBINED      THORACOSCOPY  04/26/2013    Procedure: THORACOSCOPY;  LEFT VIDEO ASSISTED THORACOSCOPY, RESECTION OF POSTERIOR MEDIASTINAL MASS;  Surgeon: Av Peña MD;  Location:  OR    TONSILLECTOMY & ADENOIDECTOMY           Soc Hx: No daily alcohol, no smoking  Social History     Socioeconomic History    Marital status:      Spouse name: Not on file    Number of children: Not on file    Years of education: Not on file    Highest education level: Not on file   Occupational History    Not on file   Tobacco Use    Smoking status: Every Day     Current packs/day: 0.30     Average packs/day: 0.3 packs/day for 30.0 years (9.0 ttl pk-yrs)     Types: Cigarettes     Passive exposure: Past    Smokeless tobacco: Never    Tobacco comments:     1-2 cigs per day   Vaping Use    Vaping status: Never Used   Substance and Sexual Activity    Alcohol use: Not Currently    Drug use: No    Sexual activity: Not Currently   Other Topics Concern     Service Not Asked    Blood Transfusions Not Asked    Caffeine Concern No     Comment: 4-5 cans of pop daily    Occupational Exposure Not Asked    Hobby Hazards Not Asked    Sleep Concern Not Asked    Stress Concern Not Asked    Weight Concern Not Asked    Special Diet No    Back Care Not Asked    Exercise No     Comment: on hold    Bike Helmet Not Asked    Seat Belt Not Asked    Self-Exams Not Asked    Parent/sibling w/ CABG, MI or angioplasty before 65F 55M? Yes   Social History Narrative    Not on file     Social Drivers of Health     Financial Resource Strain: Low Risk  (4/15/2025)    Financial Resource Strain     Within the past 12 months, have you or your family members you live with been unable to get utilities (heat, electricity) when it was really needed?: No   Food Insecurity: High Risk (4/15/2025)    Food Insecurity     Within the past 12 months, did you worry that your food would run out before you got money to buy more?: Yes     Within the past 12 months, did the food you bought just not last and you didn t have money to get more?: Yes   Transportation Needs: Low Risk  (4/15/2025)    Transportation Needs     Within the past 12 months, has lack of transportation kept you from medical  appointments, getting your medicines, non-medical meetings or appointments, work, or from getting things that you need?: No   Physical Activity: Unknown (4/15/2025)    Exercise Vital Sign     Days of Exercise per Week: 3 days     Minutes of Exercise per Session: Not on file   Stress: Stress Concern Present (4/15/2025)    Citizen of [place] of Occupational Health - Occupational Stress Questionnaire     Feeling of Stress : Very much   Social Connections: Unknown (4/15/2025)    Social Connection and Isolation Panel [NHANES]     Frequency of Communication with Friends and Family: Not on file     Frequency of Social Gatherings with Friends and Family: Twice a week     Attends Oriental orthodox Services: Not on file     Active Member of Clubs or Organizations: Not on file     Attends Club or Organization Meetings: Not on file     Marital Status: Not on file   Interpersonal Safety: Low Risk  (4/15/2025)    Interpersonal Safety     Do you feel physically and emotionally safe where you currently live?: Yes     Within the past 12 months, have you been hit, slapped, kicked or otherwise physically hurt by someone?: No     Within the past 12 months, have you been humiliated or emotionally abused in other ways by your partner or ex-partner?: No   Housing Stability: Low Risk  (4/15/2025)    Housing Stability     Do you have housing? : Yes     Are you worried about losing your housing?: No   Recent Concern: Housing Stability - High Risk (2/11/2025)    Housing Stability     Do you have housing? : No     Are you worried about losing your housing?: No        Fam Hx: reviewed  Family History   Problem Relation Age of Onset    Myocardial Infarction Mother     Hypertension Mother     Cerebrovascular Disease Mother          Screening: reviewed      All: reviewed    Meds: reviewed  Current Outpatient Medications   Medication Sig Dispense Refill    ACCU-CHEK GUIDE test strip USE TO TEST BLOOD SUGAR ONCE A DAY OR AS DIRECTED 100 strip 1     acetaminophen (TYLENOL) 500 MG tablet Take 2 tablets (1,000 mg) by mouth every 6 hours as needed for pain      albuterol (PROAIR HFA/PROVENTIL HFA/VENTOLIN HFA) 108 (90 Base) MCG/ACT inhaler INHALE TWO PUFFS BY MOUTH EVERY 6 HOURS 6.7 g 0    albuterol (PROVENTIL) (2.5 MG/3ML) 0.083% neb solution INHALE ONE VIAL BY NEBULIZER EVERY 6 HOURS AS NEEDED FOR SHORTNESS OF BREATH OR WHEEZING Strength: (2.5 MG/3ML) 0.083% 75 mL 1    ALLERGY RELIEF CETIRIZINE 10 MG tablet TAKE ONE TABLET BY MOUTH EVERY DAY AS NEEDED 90 tablet 1    benzoyl peroxide (ACNE-CLEAR) 10 % external gel Apply topically 2 times daily as needed 90 g 1    blood glucose (NO BRAND SPECIFIED) lancets standard Use to test blood sugar 2 times daily or as directed. 200 each 3    blood glucose (NO BRAND SPECIFIED) test strip Use to test blood sugar 2 times daily or as directed. 200 strip 3    blood glucose (NO BRAND SPECIFIED) test strip Use to test blood sugar 1 times daily or as directed.  Dispense Accu-chek Olinda Plus or per patient insurance 100 strip 3    blood glucose monitoring (NO BRAND SPECIFIED) meter device kit Use to test blood sugar 2 times daily or as directed. 1 kit 0    blood glucose monitoring (SOFTCLIX) lancets USE TO TEST BLOOD SUGARS ONCE DAILY OR AS DIRECTED 100 each 0    buPROPion (WELLBUTRIN XL) 150 MG 24 hr tablet TAKE ONE TABLET BY MOUTH EVERY MORNING 90 tablet 2    busPIRone (BUSPAR) 10 MG tablet Take 1 tablet by mouth 2 times daily.      clindamycin (CLINDAMAX) 1 % external gel Apply topically 2 times daily 60 g 3    clonazePAM (KLONOPIN) 1 MG tablet Take 1 tablet by mouth 2 times daily      clotrimazole (LOTRIMIN) 1 % external cream Apply topically 2 times daily 30 g 0    eszopiclone (LUNESTA) 2 MG tablet Take 2 mg by mouth at bedtime      fenofibrate (LOFIBRA) 54 MG tablet Take 1 tablet (54 mg) by mouth daily. 90 tablet 1    fluticasone (FLONASE) 50 MCG/ACT nasal spray Spray 1 spray into both nostrils daily. 16 g 5     Fluticasone-Umeclidin-Vilant (TRELEGY ELLIPTA) 100-62.5-25 MCG/ACT oral inhaler Inhale 1 puff into the lungs daily. 3 each 3    furosemide (LASIX) 20 MG tablet TAKE 1 TABLET BY MOUTH TWICE DAILY 180 tablet 3    gabapentin (NEURONTIN) 100 MG capsule TAKE ONE CAPSULE BY MOUTH THREE TIMES A DAY 90 capsule 4    hydrocortisone 2.5 % cream APPLY TOPICALLY TWO TIMES A DAY 30 g 0    hydrOXYzine HCl (ATARAX) 50 MG tablet TAKE TWO TABLETS BY MOUTH THREE TIMES A DAY AS NEEDED FOR ANXIETY 270 tablet 1    ibuprofen (ADVIL/MOTRIN) 200 MG tablet Take 3 tablets (600 mg) by mouth every 6 hours as needed for moderate pain (4-6)      lamoTRIgine (LAMICTAL) 200 MG tablet Take 1 tablet by mouth 2 times daily      levothyroxine (SYNTHROID/LEVOTHROID) 50 MCG tablet TAKE ONE TABLET BY MOUTH EVERY DAY 90 tablet 2    lisinopril (ZESTRIL) 10 MG tablet Take 2 tablets (20 mg) by mouth daily. 180 tablet 1    metFORMIN (GLUCOPHAGE) 500 MG tablet Take 1 tablet (500 mg) by mouth daily (with breakfast). 90 tablet 1    montelukast (SINGULAIR) 10 MG tablet TAKE ONE TABLET BY MOUTH AT BEDTIME 90 tablet 3    multivitamin, therapeutic (THERA-VIT) TABS tablet Take 1 tablet by mouth daily      nebivolol (BYSTOLIC) 5 MG tablet Take 1 tablet (5 mg) by mouth daily. Appointment needed for additional refills. Please call [phone] to schedule. 30 tablet 2    nitroGLYcerin (NITROSTAT) 0.4 MG sublingual tablet PLACE ONE TABLET UNDER THE TONGUE AT THE 1ST SIGN OF ATTACK. IF PAIN IS UNRELIEVED OR WORSENED 5 MINUTES AFTER 1ST DOSE, PROMPT MEDICAL ASSISTANCE IS NEEDED. MAY REPEAT EVERY 5 MINUTES UNTIL PAIN IS RELIEVED. MAX OF THREE DOSES 25 tablet 4    nystatin (NYAMYC) 016699 UNIT/GM external powder APPLY TO AFFECTED AREA(S) TWO TIMES A  g 0    omeprazole (PRILOSEC) 40 MG DR capsule TAKE ONE CAPSULE BY MOUTH TWICE A DAY FOR 1 MONTH THEN TAKE ONE CAPSULE EVERY DAY 60 capsule 2    ondansetron (ZOFRAN ODT) 4 MG ODT tab Take 1 tablet (4 mg) by mouth every 8  "hours as needed for nausea 4 tablet 0    rosuvastatin (CRESTOR) 40 MG tablet Take 1 tablet (40 mg) by mouth daily. 90 tablet 2    semaglutide (OZEMPIC, 0.25 OR 0.5 MG/DOSE,) 2 MG/3ML pen Inject 0.5 mg subcutaneously every 7 days. 3 mL 1    Semaglutide, 1 MG/DOSE, (OZEMPIC) 4 MG/3ML pen Inject 1 mg subcutaneously every 7 days. 3 mL 0    sucralfate (CARAFATE) 1 GM tablet TAKE ONE TABLET BY MOUTH FOUR TIMES A DAY AS NEEDED FOR NAUSEA 120 tablet 0    triamcinolone (KENALOG) 0.1 % external cream Apply topically 2 times daily. 30 g 1    vitamin B-Complex Take 1 tablet by mouth daily. 90 tablet 1    vitamin B6 (PYRIDOXINE) 100 MG tablet Take 1 tablet (100 mg) by mouth daily. 90 tablet 4    Vitamin D3 (CHOLECALCIFEROL) 125 MCG (5000 UT) tablet Take 1 tablet by mouth three times a week         OBJECTIVE:                                                    Physical Exam :  Blood pressure 118/78, pulse 91, temperature 97.1  F (36.2  C), temperature source Tympanic, resp. rate 18, height 1.626 m (5' 4.02\"), weight 97.8 kg (215 lb 8 oz), last menstrual period 01/01/2014, SpO2 95%, not currently breastfeeding.     NAD, appears comfortable  Skin clear, no rashes  Neck: supple, no JVD,  no thyroidmegaly  Lymph nodes non palpable in the cervical, supraclavicular axillaries,   Chest: clear to auscultation with good respiratory effort  Cardiac: S1S2, RRR, no mgr appreciated  Abdomen: soft, not tender, not distended, audible bowel sound, no hepatosplenomegaly, no palpable masses, no abdominal bruits  Extremities: no cyanosis, clubbing or edema.   Neuro: A, Ox3, no focal signs.  Breast exam in supine and erect position: they are symmetrical, no skin changes, no tenderness or nodes on palpation. Nipples are erect, no skin lesions, no discharge on pressure.    Pelvic exam: Normal external genitals, normal appearing perineum, normal appearing urethra,  vaginal mucosa pink, no discharge, Cervix appears normal, Pap smear obtained. On bimanual " exam, I did not feel any uterus or ovarian masses, and she denies any tenderness.        Patient has been advised of split billing requirements and indicates understanding: Yes.  At the check in, at the      Appropriate preventive services were discussed with this patient, including applicable screening as appropriate for   -- vaccines  -- nutrition,   -- physical activity   -- fall prevention,  -- keeping a healthy weight  -- cognition -- see below MME  -- Tobacco-use cessation, -- not smoker     Roro Low MD  Internal Medicine       ###########################    Preventive Care Visit  [facility]  Roro Chawla MD, Internal Medicine  Apr 15, 2025          Malena Milian is a 54 year old, presenting for the following:  Medicare Visit        4/15/2025     9:53 AM   Additional Questions   Roomed by Jessicai   Accompanied by Self           HPI    Answers submitted by the patient for this visit:  Patient Health Questionnaire (Submitted on 4/15/2025)  If you checked off any problems, how difficult have these problems made it for you to do your work, take care of things at home, or get along with other people?: Somewhat difficult  PHQ9 TOTAL SCORE: 16  Patient Health Questionnaire (G7) (Submitted on 4/15/2025)  GIDEON 7 TOTAL SCORE: 20           Advance Care Planning  Patient does not have a Health Care Directive: Discussed advance care planning with patient; however, patient declined at this time.      4/15/2025   General Health   How would you rate your overall physical health? (!) POOR         4/15/2025   Nutrition   Diet: Low salt    Low fat/cholesterol    Diabetic       Multiple values from one day are sorted in reverse-chronological order         4/15/2025   Exercise   Days per week of moderate/strenous exercise 3 days         2/11/2025   Social Factors   Frequency of gathering with friends or relatives Once a week   Worry food won't last until get money to buy  more Yes   Food not last or not have enough money for food? Yes   Do you have housing? (Housing is defined as stable permanent housing and does not include staying ouside in a car, in a tent, in an abandoned building, in an overnight shelter, or couch-surfing.) No   Are you worried about losing your housing? No   Lack of transportation? No   Unable to get utilities (heat,electricity)? No   Want help with housing or utility concern? No         2/11/2025   Activities of Daily Living- Home Safety   Needs help with the following daily activites Transportation    Shopping    Housework   Safety concerns in the home None of the above       Multiple values from one day are sorted in reverse-chronological order         2/11/2025   Dental   Dentist two times every year? (!) NO         2/11/2025   Hearing Screening   Hearing concerns? None of the above           2/11/2025   Urinary Incontinence Screening   Bothered by leaking urine in past 6 months No         Today's PHQ-9 Score:       4/15/2025     9:51 AM   PHQ-9 SCORE   PHQ-9 Total Score MyChart 16 (Moderately severe depression)   PHQ-9 Total Score 16        Patient-reported         2/24/2025   Substance Use   If I could quit smoking, I would Completely agree   I want to quit somking, worry about health affects Completely agree   Willing to make a plan to quit smoking Neutral   Willing to cut down before quitting Somewhat agree     Social History     Tobacco Use    Smoking status: Every Day     Current packs/day: 0.30     Average packs/day: 0.3 packs/day for 30.0 years (9.0 ttl pk-yrs)     Types: Cigarettes     Passive exposure: Past    Smokeless tobacco: Never    Tobacco comments:     1-2 cigs per day   Vaping Use    Vaping status: Never Used   Substance Use Topics    Alcohol use: Not Currently    Drug use: No           8/24/2022   LAST FHS-7 RESULTS   1st degree relative breast or ovarian cancer No   Any relative bilateral breast cancer No   Any male have breast cancer No    Any ONE woman have BOTH breast AND ovarian cancer No   Any woman with breast cancer before 50yrs No   2 or more relatives with breast AND/OR ovarian cancer Unknown   2 or more relatives with breast AND/OR bowel cancer No              History of abnormal Pap smear:         Latest Ref Rng & Units 3/11/2016     8:20 AM 3/11/2016    12:00 AM 6/12/2012    10:03 AM   PAP / HPV   PAP (Historical)   NIL  NIL    HPV 16 DNA NEG Negative      HPV 18 DNA NEG Negative      Other HR HPV NEG Negative        ASCVD Risk   The 10-year ASCVD risk score (Lizzette DK, et al., 2019) is: 15.1%    Values used to calculate the score:      Age: 54 years      Sex: Female      Is Non- : No      Diabetic: Yes      Tobacco smoker: Yes      Systolic Blood Pressure: 118 mmHg      Is BP treated: Yes      HDL Cholesterol: 42 mg/dL      Total Cholesterol: 228 mg/dL            Reviewed and updated as needed this visit by Provider                      Current providers sharing in care for this patient include:  Patient Care Team:  Roro Chawla MD as PCP - General (Internal Medicine)  Roro Chawla MD as Assigned PCP  Dipti Mayne MD as MD (Otolaryngology)  Lis Talbert MD as MD (Otolaryngology)  Bhavani Tanner MD as MD (Urology)  Eduarda Pandya, RN as Registered Nurse (Urology)  [facility] ([facility] ([place]  Outside, Provider as   Serafin Wiley MD as MD (Gastroenterology)  Jakob Lawrence MD as MD (Otolaryngology)  Lamont Jordan MD as MD (Dermatology)  Rush Fraga MD as MD (Pulmonary Disease)  Kandice Felton, SLP as Speech Language Pathologist (Speech Language/Path)  Ashley Antonio PAKarynC as Physician Assistant (Dermatology)  Eileen Pak PA-C as Physician Assistant (Dermatology)  Lisa Harris DO as MD (Gastroenterology)  Srini Wong PA-C  as Referring Physician (Family Medicine)  Bloch, Lauren Turner, RP as Pharmacist (Pharmacist)  Lesley Morales PA-C as Physician Assistant (Cardiovascular Disease)  Rika Petit RD as Registered Dietitian (Dietitian, Registered)  Quinten Adam MD as MD (Neurology)  Sammy Herndon MD as MD (Urology)  Kassidy Tate DO as Assigned Heart and Vascular Provider  Berta Travis, LEIGH CNP as Assigned Surgical Provider  No Ref-Primary, Physician  Alfie Mcknight MD as Resident (Dermatology)  Marcie [place] PA-C as Physician Assistant (Dermatology)  Brandon Perea MD as MD (Dermatology)  Vicky Robbins RPH as Pharmacist (Pharmacist)  Vicky Robbins RPH as Assigned MTM Pharmacist    The following health maintenance items are reviewed in Epic and correct as of today:  Health Maintenance   Topic Date Due    MAMMO SCREENING  06/18/2016    Pneumococcal Vaccine: 50+ Years (2 of 2 - PCV) 11/21/2019    ZOSTER IMMUNIZATION (2 of 2) 12/02/2020    HPV TEST  03/11/2021    PAP  03/11/2021    LUNG CANCER SCREENING  05/28/2022    URINE DRUG SCREEN  12/23/2023    INFLUENZA VACCINE (1) 09/01/2024    COVID-19 Vaccine (6 - 2024-25 season) 09/01/2024    MEDICARE ANNUAL WELLNESS VISIT  10/18/2024    ANNUAL REVIEW OF HM ORDERS  03/11/2025    MICROALBUMIN  04/09/2025    EYE EXAM  04/17/2025    COPD ACTION PLAN  05/07/2053 (Originally 1970)    BMP  01/16/2026    LIPID  01/16/2026    TSH W/FREE T4 REFLEX  01/16/2026    NICOTINE/TOBACCO CESSATION COUNSELING Q 1 YR  04/03/2026    GIDEON ASSESSMENT  04/15/2026    DTAP/TDAP/TD IMMUNIZATION (5 - Td or Tdap) 02/09/2028    COLORECTAL CANCER SCREENING  01/20/2030    ADVANCE CARE PLANNING  04/15/2030    SPIROMETRY  Completed    HEPATITIS C SCREENING  Completed    HIV SCREENING  Completed    Medicare Annual MTM Pharmacist Visit (once per calendar year)  Completed    HPV IMMUNIZATION  Aged Out    MENINGITIS IMMUNIZATION  Aged Out    A1C  Discontinued     "DIABETIC FOOT EXAM  Discontinued    HEPATITIS B IMMUNIZATION  Discontinued            Objective    Exam  /78 (BP Location: Right arm, Patient Position: Sitting, Cuff Size: Adult Large)   Pulse 91   Temp 97.1  F (36.2  C) (Tympanic)   Resp 18   Ht 1.626 m (5' 4.02\")   Wt 97.8 kg (215 lb 8 oz)   LMP 01/01/2014 (Approximate)   SpO2 95%   BMI 36.97 kg/m     Estimated body mass index is 36.97 kg/m  as calculated from the following:    Height as of this encounter: 1.626 m (5' 4.02\").    Weight as of this encounter: 97.8 kg (215 lb 8 oz).    Physical Exam           4/15/2025   Mini Cog   Clock Draw Score 2 Normal   3 Item Recall 1 object recalled   Mini Cog Total Score 3              Signed Electronically by: Roro Chawla MD    "

## 2025-04-15 NOTE — PATIENT INSTRUCTIONS
Plan:   Labs today - [address]   2. Ask the eye doctor to send report to this chart: [phone]  3. Mammogram ( please call [phone] to schedule it)   4. Continue same meds, same doses for now   5. Schedule a follow up appointment in 4-5 months  6. The following vaccines are recommended for you. Please check with your insurance about coverage.  Some insurances cover better if you have these vaccines at the pharmacy:  -- f/unit(s) / Covid / RSV  -- Pneumonia 20  -- Tetanus vaccine -- Td  -- Shingrix vaccine - second dose  4. Ask the Home Health Nurse to help you scheduling neurology appointment with one of these departments    [facility] OF NEUROLOGY  [facility] [address] [address]  Phone: [phone]  Fax: [phone]    [facility]    [address] [address]  Phone: [phone]

## 2025-04-16 ENCOUNTER — MEDICAL CORRESPONDENCE (OUTPATIENT)
Dept: HEALTH INFORMATION MANAGEMENT | Facility: CLINIC | Age: 55
End: 2025-04-16

## 2025-04-16 ENCOUNTER — PATIENT OUTREACH (OUTPATIENT)
Dept: CARE COORDINATION | Facility: CLINIC | Age: 55
End: 2025-04-16
Payer: COMMERCIAL

## 2025-04-16 DIAGNOSIS — R60.0 BILATERAL LEG EDEMA: ICD-10-CM

## 2025-04-16 DIAGNOSIS — E11.9 TYPE 2 DIABETES MELLITUS WITHOUT COMPLICATION, WITHOUT LONG-TERM CURRENT USE OF INSULIN (H): ICD-10-CM

## 2025-04-16 LAB
ALBUMIN SERPL BCG-MCNC: 4.6 G/DL (ref 3.5–5.2)
ALP SERPL-CCNC: 123 U/L (ref 40–150)
ALT SERPL W P-5'-P-CCNC: 34 U/L (ref 0–50)
ANION GAP SERPL CALCULATED.3IONS-SCNC: 15 MMOL/L (ref 7–15)
AST SERPL W P-5'-P-CCNC: 32 U/L (ref 0–45)
BILIRUB SERPL-MCNC: 0.4 MG/DL
BUN SERPL-MCNC: 13.1 MG/DL (ref 6–20)
CALCIUM SERPL-MCNC: 9.6 MG/DL (ref 8.8–10.4)
CHLORIDE SERPL-SCNC: 103 MMOL/L (ref 98–107)
CHOLEST SERPL-MCNC: 223 MG/DL
CREAT SERPL-MCNC: 0.93 MG/DL (ref 0.51–0.95)
CREAT UR-MCNC: 217 MG/DL
EGFRCR SERPLBLD CKD-EPI 2021: 72 ML/MIN/1.73M2
FASTING STATUS PATIENT QL REPORTED: YES
FASTING STATUS PATIENT QL REPORTED: YES
GLUCOSE SERPL-MCNC: 96 MG/DL (ref 70–99)
HCO3 SERPL-SCNC: 23 MMOL/L (ref 22–29)
HDLC SERPL-MCNC: 45 MG/DL
HPV HR 12 DNA CVX QL NAA+PROBE: NEGATIVE
HPV16 DNA CVX QL NAA+PROBE: NEGATIVE
HPV18 DNA CVX QL NAA+PROBE: NEGATIVE
HUMAN PAPILLOMA VIRUS FINAL DIAGNOSIS: NORMAL
LDLC SERPL CALC-MCNC: 130 MG/DL
MICROALBUMIN UR-MCNC: 27 MG/L
MICROALBUMIN/CREAT UR: 12.44 MG/G CR (ref 0–25)
NONHDLC SERPL-MCNC: 178 MG/DL
POTASSIUM SERPL-SCNC: 4.4 MMOL/L (ref 3.4–5.3)
PROT SERPL-MCNC: 8 G/DL (ref 6.4–8.3)
SODIUM SERPL-SCNC: 141 MMOL/L (ref 135–145)
TRIGL SERPL-MCNC: 241 MG/DL
TSH SERPL DL<=0.005 MIU/L-ACNC: 1.07 UIU/ML (ref 0.3–4.2)
VIT D+METAB SERPL-MCNC: 44 NG/ML (ref 20–50)

## 2025-04-16 RX ORDER — SEMAGLUTIDE 0.68 MG/ML
INJECTION, SOLUTION SUBCUTANEOUS
Qty: 3 ML | Refills: 1 | OUTPATIENT
Start: 2025-04-16

## 2025-04-16 RX ORDER — FUROSEMIDE 20 MG/1
20 TABLET ORAL 2 TIMES DAILY
Qty: 180 TABLET | Refills: 3 | Status: SHIPPED | OUTPATIENT
Start: 2025-04-16

## 2025-04-16 RX ORDER — LANCETS
EACH MISCELLANEOUS
Qty: 200 EACH | Refills: 3 | Status: SHIPPED | OUTPATIENT
Start: 2025-04-16

## 2025-04-26 DIAGNOSIS — E11.9 TYPE 2 DIABETES MELLITUS WITHOUT COMPLICATION, WITHOUT LONG-TERM CURRENT USE OF INSULIN (H): ICD-10-CM

## 2025-04-28 ENCOUNTER — TELEPHONE (OUTPATIENT)
Dept: INTERNAL MEDICINE | Facility: CLINIC | Age: 55
End: 2025-04-28
Payer: COMMERCIAL

## 2025-04-28 RX ORDER — ORAL SEMAGLUTIDE 14 MG/1
TABLET ORAL DAILY
Qty: 90 TABLET | Refills: 0 | OUTPATIENT
Start: 2025-04-28

## 2025-04-28 NOTE — TELEPHONE ENCOUNTER
[facility] * Plan of Care 4- to 6- received via fax     Forms in [facility] mail box for review and signature.

## 2025-04-30 DIAGNOSIS — Z71.6 ENCOUNTER FOR SMOKING CESSATION COUNSELING: ICD-10-CM

## 2025-04-30 DIAGNOSIS — Z53.9 DIAGNOSIS NOT YET DEFINED: Primary | ICD-10-CM

## 2025-04-30 PROCEDURE — G0179 MD RECERTIFICATION HHA PT: HCPCS | Performed by: INTERNAL MEDICINE

## 2025-04-30 RX ORDER — BUPROPION HYDROCHLORIDE 150 MG/1
TABLET ORAL
Qty: 90 TABLET | Refills: 2 | OUTPATIENT
Start: 2025-04-30

## 2025-05-01 DIAGNOSIS — E03.9 HYPOTHYROIDISM, UNSPECIFIED TYPE: ICD-10-CM

## 2025-05-01 DIAGNOSIS — R73.03 PREDIABETES: ICD-10-CM

## 2025-05-01 DIAGNOSIS — E11.9 TYPE 2 DIABETES MELLITUS WITHOUT COMPLICATION, WITHOUT LONG-TERM CURRENT USE OF INSULIN (H): ICD-10-CM

## 2025-05-01 DIAGNOSIS — E78.5 HYPERLIPIDEMIA LDL GOAL <130: Chronic | ICD-10-CM

## 2025-05-01 RX ORDER — LEVOTHYROXINE SODIUM 50 UG/1
50 TABLET ORAL DAILY
Qty: 90 TABLET | Refills: 2 | Status: SHIPPED | OUTPATIENT
Start: 2025-05-01

## 2025-05-01 NOTE — TELEPHONE ENCOUNTER
For Rybelsus - The original prescription was discontinued on 2/20/2025 by Lis Busch RN for the following reason: Med Rec(No AVS / No eCancel). Renewing this prescription may not be appropriate.     For Atorvastatin 80mg - The original prescription was discontinued on 8/19/2024 by Elizabeth Gong CNP for the following reason: Duplicate Therapy (No AVS / No eCancel). Renewing this prescription may not be appropriate.     Attempt # 1  Called Phone # [phone]      Was Call answered? No     Non-detailed voicemail left on May 1, 2025 10:55 AM to call clinic at: [phone].     On Call Back:     Please relay need to know if patient is still taking both meds. Neither medication is on med list.      Thank you,  Benedict, Triage NNEKA Beasley    10:55 AM 5/1/2025

## 2025-05-01 NOTE — TELEPHONE ENCOUNTER
Clinic RN: Please investigate patient's chart or contact patient if the information cannot be found because the medication is listed as historical or discontinued. Confirm patient is taking this medication. Document findings and route refill encounter to provider for approval or denial.    Thank you     Lis Brian RN, BSN  [facility]

## 2025-05-02 NOTE — TELEPHONE ENCOUNTER
2nd attempt.    Sent [place] message to patient.    Thank you,  Benedict, Triage RN Svetlana Beasley    2:12 PM 5/2/2025

## 2025-05-05 RX ORDER — ORAL SEMAGLUTIDE 14 MG/1
TABLET ORAL DAILY
Qty: 90 TABLET | Refills: 0 | OUTPATIENT
Start: 2025-05-05

## 2025-05-05 RX ORDER — ATORVASTATIN CALCIUM 80 MG/1
80 TABLET, FILM COATED ORAL DAILY
Qty: 90 TABLET | Refills: 3 | OUTPATIENT
Start: 2025-05-05

## 2025-05-05 NOTE — TELEPHONE ENCOUNTER
Attempt # 3  Called Phone # [phone]      Was Call answered? No    Attempted to call patient, but patient does not have voicemail set up at this time or phone disconnected Unable to leave message.    Routing to primary care provider due to 3 failed attempts to reach patient.     Benedict, Triage NNEKA Beasley    10:36 AM 5/5/2025

## 2025-05-07 ENCOUNTER — TELEPHONE (OUTPATIENT)
Dept: INTERNAL MEDICINE | Facility: CLINIC | Age: 55
End: 2025-05-07
Payer: COMMERCIAL

## 2025-05-07 DIAGNOSIS — J44.9 CHRONIC OBSTRUCTIVE PULMONARY DISEASE, UNSPECIFIED COPD TYPE (H): Primary | ICD-10-CM

## 2025-05-07 DIAGNOSIS — E11.9 TYPE 2 DIABETES MELLITUS WITHOUT COMPLICATION, WITHOUT LONG-TERM CURRENT USE OF INSULIN (H): ICD-10-CM

## 2025-05-07 RX ORDER — SEMAGLUTIDE 1.34 MG/ML
INJECTION, SOLUTION SUBCUTANEOUS
Qty: 3 ML | Refills: 0 | Status: SHIPPED | OUTPATIENT
Start: 2025-05-07

## 2025-05-07 NOTE — TELEPHONE ENCOUNTER
Patient calling to ask for an order for a new nebulizer machine because hers is broken. She will use the [facility] in [place]. Please let patient know when this has been done. Please pend the order for the MD to sign.

## 2025-05-08 DIAGNOSIS — E11.9 TYPE 2 DIABETES MELLITUS WITHOUT COMPLICATION, WITHOUT LONG-TERM CURRENT USE OF INSULIN (H): ICD-10-CM

## 2025-05-08 RX ORDER — SEMAGLUTIDE 0.68 MG/ML
0.5 INJECTION, SOLUTION SUBCUTANEOUS
Qty: 3 ML | Refills: 1 | Status: SHIPPED | OUTPATIENT
Start: 2025-05-08

## 2025-05-08 NOTE — TELEPHONE ENCOUNTER
Patient asking for next increase as she just took her last does of 1 MG     Patient can be reached at [phone]

## 2025-05-12 ENCOUNTER — TELEPHONE (OUTPATIENT)
Dept: INTERNAL MEDICINE | Facility: CLINIC | Age: 55
End: 2025-05-12
Payer: COMMERCIAL

## 2025-05-12 NOTE — TELEPHONE ENCOUNTER
Patient is calling to say she is supposed to be taking ozempic 2mg. We sent the wrong dose she said. Her next shot is supposed to be tomorrow.

## 2025-05-13 ENCOUNTER — LAB (OUTPATIENT)
Dept: LAB | Facility: CLINIC | Age: 55
End: 2025-05-13
Payer: COMMERCIAL

## 2025-05-13 DIAGNOSIS — R73.01 IMPAIRED FASTING GLUCOSE: ICD-10-CM

## 2025-05-13 DIAGNOSIS — E78.5 HYPERLIPIDEMIA LDL GOAL <100: ICD-10-CM

## 2025-05-13 LAB
EST. AVERAGE GLUCOSE BLD GHB EST-MCNC: 117 MG/DL
HBA1C MFR BLD: 5.7 % (ref 0–5.6)

## 2025-05-13 PROCEDURE — 83036 HEMOGLOBIN GLYCOSYLATED A1C: CPT

## 2025-05-13 PROCEDURE — 36415 COLL VENOUS BLD VENIPUNCTURE: CPT

## 2025-05-13 PROCEDURE — 80053 COMPREHEN METABOLIC PANEL: CPT

## 2025-05-13 PROCEDURE — 80061 LIPID PANEL: CPT

## 2025-05-13 NOTE — TELEPHONE ENCOUNTER
Call to pt and advised. She verbalized understanding and agrees with this.     She also states she has Right foot pain for about 2 weeks. She was in the ED last week, on 5/8. For BLE swelling. She had US done and labs done.   Pt states she 'fell' about a month ago and hurt her foot at the time but pain resolved and now pain has returned.     Scheduled pt on Thursday per her request.

## 2025-05-14 DIAGNOSIS — F41.9 ANXIETY: ICD-10-CM

## 2025-05-14 LAB
ALBUMIN SERPL BCG-MCNC: 4.4 G/DL (ref 3.5–5.2)
ALP SERPL-CCNC: 134 U/L (ref 40–150)
ALT SERPL W P-5'-P-CCNC: 22 U/L (ref 0–50)
ANION GAP SERPL CALCULATED.3IONS-SCNC: 11 MMOL/L (ref 7–15)
AST SERPL W P-5'-P-CCNC: 22 U/L (ref 0–45)
BILIRUB SERPL-MCNC: 0.2 MG/DL
BUN SERPL-MCNC: 18.7 MG/DL (ref 6–20)
CALCIUM SERPL-MCNC: 9.3 MG/DL (ref 8.8–10.4)
CHLORIDE SERPL-SCNC: 101 MMOL/L (ref 98–107)
CHOLEST SERPL-MCNC: 121 MG/DL
CREAT SERPL-MCNC: 0.92 MG/DL (ref 0.51–0.95)
EGFRCR SERPLBLD CKD-EPI 2021: 73 ML/MIN/1.73M2
FASTING STATUS PATIENT QL REPORTED: YES
FASTING STATUS PATIENT QL REPORTED: YES
GLUCOSE SERPL-MCNC: 104 MG/DL (ref 70–99)
HCO3 SERPL-SCNC: 28 MMOL/L (ref 22–29)
HDLC SERPL-MCNC: 48 MG/DL
LDLC SERPL CALC-MCNC: 49 MG/DL
NONHDLC SERPL-MCNC: 73 MG/DL
POTASSIUM SERPL-SCNC: 3.8 MMOL/L (ref 3.4–5.3)
PROT SERPL-MCNC: 7.3 G/DL (ref 6.4–8.3)
SODIUM SERPL-SCNC: 140 MMOL/L (ref 135–145)
TRIGL SERPL-MCNC: 120 MG/DL

## 2025-05-14 RX ORDER — HYDROXYZINE HYDROCHLORIDE 50 MG/1
TABLET, FILM COATED ORAL
Qty: 270 TABLET | Refills: 1 | Status: SHIPPED | OUTPATIENT
Start: 2025-05-14

## 2025-05-19 ENCOUNTER — RESULTS FOLLOW-UP (OUTPATIENT)
Dept: INTERNAL MEDICINE | Facility: CLINIC | Age: 55
End: 2025-05-19
Payer: COMMERCIAL

## 2025-05-20 NOTE — TELEPHONE ENCOUNTER
Patient calls stating her blood sugar fasting in the am is 144 this morning. Patient notes her fasting blood sugars in the morning have been elevated the last 4 days. Patient is still taking her Metformin regularily. Patient reports she is having good dietary changes. Patient would like to get her Ozempic bumped up to 2mg. Patient has been taking 1mg since May 7th. Patient has tolerated the two dose of the 1mg well. Please advise on bumping up to 2mg for Ozempic.     Thank you,  Benedict, Triage NNEKA [facility]    10:03 AM 5/20/2025

## 2025-05-28 ENCOUNTER — TELEPHONE (OUTPATIENT)
Dept: INTERNAL MEDICINE | Facility: CLINIC | Age: 55
End: 2025-05-28

## 2025-05-28 ENCOUNTER — OFFICE VISIT (OUTPATIENT)
Dept: INTERNAL MEDICINE | Facility: CLINIC | Age: 55
End: 2025-05-28
Payer: COMMERCIAL

## 2025-05-28 ENCOUNTER — MEDICAL CORRESPONDENCE (OUTPATIENT)
Dept: HEALTH INFORMATION MANAGEMENT | Facility: CLINIC | Age: 55
End: 2025-05-28

## 2025-05-28 VITALS
HEIGHT: 64 IN | OXYGEN SATURATION: 95 % | DIASTOLIC BLOOD PRESSURE: 71 MMHG | TEMPERATURE: 97.1 F | SYSTOLIC BLOOD PRESSURE: 128 MMHG | HEART RATE: 74 BPM | WEIGHT: 219.7 LBS | RESPIRATION RATE: 16 BRPM | BODY MASS INDEX: 37.51 KG/M2

## 2025-05-28 DIAGNOSIS — J44.9 CHRONIC OBSTRUCTIVE PULMONARY DISEASE, UNSPECIFIED COPD TYPE (H): ICD-10-CM

## 2025-05-28 DIAGNOSIS — E11.9 TYPE 2 DIABETES MELLITUS WITHOUT COMPLICATION, WITHOUT LONG-TERM CURRENT USE OF INSULIN (H): Primary | ICD-10-CM

## 2025-05-28 DIAGNOSIS — E66.9 OBESITY (BMI 30-39.9): ICD-10-CM

## 2025-05-28 DIAGNOSIS — Z02.9 ADMINISTRATIVE ENCOUNTER: ICD-10-CM

## 2025-05-28 DIAGNOSIS — E78.5 HYPERLIPIDEMIA LDL GOAL <130: Chronic | ICD-10-CM

## 2025-05-28 DIAGNOSIS — J44.9 COPD, MODERATE (H): ICD-10-CM

## 2025-05-28 DIAGNOSIS — E78.5 HYPERLIPIDEMIA LDL GOAL <100: ICD-10-CM

## 2025-05-28 PROCEDURE — 3078F DIAST BP <80 MM HG: CPT | Performed by: INTERNAL MEDICINE

## 2025-05-28 PROCEDURE — 3074F SYST BP LT 130 MM HG: CPT | Performed by: INTERNAL MEDICINE

## 2025-05-28 PROCEDURE — G2211 COMPLEX E/M VISIT ADD ON: HCPCS | Performed by: INTERNAL MEDICINE

## 2025-05-28 PROCEDURE — 99214 OFFICE O/P EST MOD 30 MIN: CPT | Performed by: INTERNAL MEDICINE

## 2025-05-28 RX ORDER — ALBUTEROL SULFATE 90 UG/1
INHALANT RESPIRATORY (INHALATION)
Qty: 6.7 G | Refills: 1 | Status: SHIPPED | OUTPATIENT
Start: 2025-05-28

## 2025-05-28 RX ORDER — ROSUVASTATIN CALCIUM 40 MG/1
40 TABLET, COATED ORAL DAILY
Qty: 90 TABLET | Refills: 1 | Status: SHIPPED | OUTPATIENT
Start: 2025-05-28

## 2025-05-28 RX ORDER — BUDESONIDE AND FORMOTEROL FUMARATE DIHYDRATE 80; 4.5 UG/1; UG/1
2 AEROSOL RESPIRATORY (INHALATION) 2 TIMES DAILY
Qty: 10.2 G | Refills: 3 | Status: SHIPPED | OUTPATIENT
Start: 2025-05-28

## 2025-05-28 NOTE — LETTER
Swetha Patterson  [address]  [place] [address]        To Whom It May Concern:    Mrs. Swetha Patterson has been under my medical care for over 10 years. She suffers from chronic, progressive medical conditions, including COPD and degenerative arthritis, which have increasingly hindered her ability to climb the stairs to her third-floor apartment.   Relocating to a ground-level apartment would be highly beneficial for her.    Sincerely,    Roro Low MD  Internal Medicine

## 2025-05-28 NOTE — PROGRESS NOTES
Dr Low's note      Patient's instructions / PLAN:                                                        Plan:  Increase Ozempic to 2 mg weekly  2. Hold Metformin for 2 weeks. If no low blood sugars, resume Metformin 1 tablet   3. Follow up appointment in Sept 4. Please make a lab appointment for non fasting labs  few days prior   5. Letter for apartment   6. Symbicort trial instead of Trelegy        ASSESSMENT & PLAN:                                                      (E11.9) Type 2 diabetes mellitus without complication, without long-term current use of insulin (H)  (primary encounter diagnosis)  Comment: Controlled    Plan: Semaglutide, 2 MG/DOSE, (OZEMPIC) 8 MG/3ML pen,        Hemoglobin A1c, Comprehensive metabolic panel            (E66.9) Obesity (BMI 30-39.9)  Comment: trending up  Plan: Semaglutide, 2 MG/DOSE, (OZEMPIC) 8 MG/3ML pen            (J44.9) Chronic obstructive pulmonary disease, unspecified COPD type (H)  Comment: Controlled   Plan: budesonide-formoterol (SYMBICORT/BREYNA) 80-4.5        MCG/ACT inhaler            (E78.5) Hyperlipidemia LDL goal <100  Comment: much better   Plan: rosuvastatin    (Z02.9) Administrative encounter  Comment:   Plan: letter     (J44.9) COPD, moderate (H)  Comment:   Plan: albuterol (PROAIR HFA/PROVENTIL HFA/VENTOLIN         HFA) 108 (90 Base) MCG/ACT inhaler                 Chief complaint:                                                      Follow up chronic medical problems       SUBJECTIVE:                                                    History of present illness:    Labs May 13 -- Discussed     DM  -- A1c same at 5.7    HLip  -- LDL 73 <-- 178    Wt  -- she gained weight 219 <-- 215  -- on Ozempic 1 mg, willing to increase the dose     Stuttering persists  -- neuro maria g Aug 19     Letter request  -- her apt is at the 3rd floor and there is an apt at the ground level she can move into, but she needs doctor letter     COPD  -- she think that Symbicort used  "to work better than Trelegy and wants it changed     She cannot tolertae gabapentin. She stopped it    Swetha Patterson  [address]  [place] [address]    To Whom It May Concern:    Mrs. MELARA has been under my medical care for over 10 years. She suffers from chronic, progressive medical conditions, including COPD and degenerative arthritis, which have increasingly hindered her ability to climb the stairs to her third-floor apartment. Relocating to a ground-level apartment would be highly beneficial for her.    Malena Milian is a 55 year old, presenting for the following health issues:  Follow Up and ER F/U      5/28/2025     8:41 AM   Additional Questions   Roomed by TAMMY   Accompanied by SELF         5/28/2025     8:41 AM   Patient Reported Additional Medications   Patient reports taking the following new medications none     HPI        ED/UC Followup:    Facility:  [facility]  Date of visit: 5/8/25  Reason for visit: SOB, Edema  Current Status: Lt leg edema/pain, dizziness    Review of Systems:                                                      ROS: negative for fever, chills, cough, wheezes, chest pain, shortness of breath, vomiting, abdominal pain, leg swelling       OBJECTIVE:             Physical exam:  Blood pressure 128/71, pulse 74, temperature 97.1  F (36.2  C), temperature source Tympanic, resp. rate 16, height 1.626 m (5' 4\"), weight 99.7 kg (219 lb 11.2 oz), last menstrual period 01/01/2014, SpO2 95%, not currently breastfeeding.     NAD, appears comfortable  Skin: no rashes   Neck: supple, no JVD,  No thyroidmegaly. Lymph nodes nonpalpable cervical and supraclavicular.  Chest: clear to auscultation bilaterally, good respiratory effort  Heart: S1 S2, RRR, no mgr appreciated  Abdomen: soft, not tender, no hepatosplenomegaly or masses appreciated, no abdominal bruit, present bowel sounds  Extremities: no edema, cl  Neurologic: A, Ox3, no focal signs appreciated    PMHx: " reviewed  Past Medical History:   Diagnosis Date    Anxiety 1986    Arthritis     Benign essential hypertension     Bipolar disorder (H)     Chemical dependency (H) 1985    I'm sober now no smoking nothing    Chronic abdominal pain     Chronic constipation     COPD (chronic obstructive pulmonary disease) (H)     Depressive disorder All my life    Diabetes (H)     Earache or other ear, nose, or throat complaint 2013    Polyps in my throat and lungs    Esophageal reflux 2016    Gastroesophageal reflux disease     Heart disease 9 /10. 23    My heart is fibulating or is it called defibrillating it's skipping beats and it's moving too fast    History of colonic polyps 2021    History of emphysema (H) 216    History of steroid therapy 5    It's about every other week that I have to have prednisone t    Hoarseness     Hypothyroidism     Impaired fasting glucose     Kidney stone     Lung disease 2013    Chronic lung disease being overweight    Obesity (BMI 30-39.9)     PONV (postoperative nausea and vomiting)     Pre-diabetes     Pure hypercholesterolemia     Skin disease     Stomach problems 2019    Uncomplicated asthma       PSHx: reviewed  Past Surgical History:   Procedure Laterality Date    ANESTHESIA OUT OF OR MRI N/A 10/07/2021    Procedure: ANESTHESIA OUT OF OR MRI;  Surgeon: GENERIC ANESTHESIA PROVIDER;  Location:  OR    ANESTHESIA OUT OF OR MRI N/A 05/11/2023    Procedure: MRI OF NECK WITH AND WITHOUT CONTRAST;  Surgeon: GENERIC ANESTHESIA PROVIDER;  Location:  OR    ANESTHESIA OUT OF OR MRI Left 06/26/2023    Procedure: Anesthesia out of OR MRI;  Surgeon: GENERIC ANESTHESIA PROVIDER;  Location: [facility] OR    ANESTHESIA OUT OF OR MRI N/A 08/22/2024    Procedure: Anesthesia out of OR MRI lumbar spine;  Surgeon: GENERIC ANESTHESIA PROVIDER;  Location:  OR    ANESTHESIA OUT OF OR MRI N/A 02/27/2025    Procedure: MRI of Brain with and without contrast;  Surgeon: GENERIC ANESTHESIA PROVIDER;  Location:  OR     APPENDECTOMY      ARTHROSCOPY SHOULDER DECOMPRESSION Left 10/21/2015    Procedure: ARTHROSCOPY SHOULDER DECOMPRESSION;  Surgeon: Julien Milian MD;  Location: [facility] OR    BIOPSY ARTERY TEMPORAL Left 03/11/2020    Procedure: LEFT TEMPORAL ARTERY BIOPSY;  Surgeon: Ibeth Conner MD;  Location: [facility] OR    BRONCHIAL THERMOPLASTY N/A 11/14/2014    Procedure: BRONCHIAL THERMOPLASTY;  Surgeon: Ward Whitkaer MD;  [facility]: [facility]    BRONCHIAL THERMOPLASTY N/A 12/19/2014    Procedure: BRONCHIAL THERMOPLASTY;  Surgeon: Ward Whitaker MD;  [facility]: [facility] OR    BRONCHIAL THERMOPLASTY N/A 02/06/2015    Procedure: BRONCHIAL THERMOPLASTY;  Surgeon: Ward Whitaker MD;  Location: [facility] OR    COLONOSCOPY N/A 11/26/2018    Procedure: COLONOSCOPY ([facility]);  Surgeon: Marshall Oakes MD;  Location: [facility] OR    COLONOSCOPY N/A 10/22/2020    Procedure: Colonoscopy;  Surgeon: Marshall Mccormick MD;  Location: [facility] OR    COLONOSCOPY N/A 01/20/2023    Procedure: COLONOSCOPY, FLEXIBLE, WITH LESION REMOVAL USING SNARE;  Surgeon: Carson Domínguez MD;  Location:  GI    COMBINED CYSTOSCOPY, RETROGRADES, URETEROSCOPY, LASER HOLMIUM LITHOTRIPSY URETER(S), INSERT STENT Right 07/19/2023    Procedure: Cystoscopy, Right Ureteroscopy, Right Retrograde Pyelogram;  Surgeon: Sammy Herndon MD;  Location: [facility] OR    DISCECTOMY, FUSION CERVICAL ANTERIOR ONE LEVEL, COMBINED N/A 05/01/2018    Procedure: COMBINED DISCECTOMY, FUSION CERVICAL ANTERIOR ONE LEVEL;  1.  C5-C6 anterior cervical diskectomy and fusion.    2.  C5-C6 application of intervertebral biomechanical device for interbody fusion purposes.    3.  C5-C6 anterior instrumentation using the standard Kristin InViZia 24 mm plate with four associated bone screws, 12 mm in length.  Inferior screws are fixed.  Superior screws are va    ESOPHAGOSCOPY, GASTROSCOPY, DUODENOSCOPY (EGD), COMBINED N/A 10/22/2020    Procedure: Esophagoscopy, gastroscopy, duodenoscopy with  biopsies;  Surgeon: Marshall Mccormick MD;  Location:  OR    ESOPHAGOSCOPY, GASTROSCOPY, DUODENOSCOPY (EGD), COMBINED N/A 06/17/2021    Procedure: ESOPHAGOGASTRODUODENOSCOPY, WITH BIOPSY;  Surgeon: Darrel Damon MD;  Location:  GI    EXCISE MASS NECK Left 02/01/2023    Procedure: exploration of  NECK left;  Surgeon: Ibeth Conner MD;  Location: [facility] OR    EXCISE MASS TRUNK Left 11/03/2021    Procedure: EXCISION LEFT SHOULDER LIPOMA;  Surgeon: Ibeth Conner MD;  Location: [facility] OR    EXCISE NODE MEDIASTINAL  04/26/2013    Procedure: EXCISE NODE MEDIASTINAL;;  Surgeon: vA Peña MD;  Location:  OR    HEAD & NECK SURGERY  2018    Had my five and six fused in my neck    LAPAROSCOPIC CHOLECYSTECTOMY N/A 10/19/2017    Procedure: LAPAROSCOPIC CHOLECYSTECTOMY;  LAPAROSCOPIC CHOLECYSTECTOMY;  Surgeon: Ney Jerry MD;  Location:  OR    LARYNGOSCOPY, EXCISE VOCAL CORD LESION, COMBINED      LUNG SURGERY      OH SHOULDER SURG PROC UNLISTED      OH STOMACH SURGERY PROCEDURE UNLISTED      THORACOSCOPY  04/26/2013    Procedure: THORACOSCOPY;  LEFT VIDEO ASSISTED THORACOSCOPY, RESECTION OF POSTERIOR MEDIASTINAL MASS;  Surgeon: Av Peña MD;  Location:  OR    TONSILLECTOMY      TONSILLECTOMY & ADENOIDECTOMY          Meds: reviewed  Current Outpatient Medications   Medication Sig Dispense Refill    ACCU-CHEK GUIDE test strip USE TO TEST BLOOD SUGAR ONCE A DAY OR AS DIRECTED 100 strip 1    acetaminophen (TYLENOL) 500 MG tablet Take 2 tablets (1,000 mg) by mouth every 6 hours as needed for pain      albuterol (PROAIR HFA/PROVENTIL HFA/VENTOLIN HFA) 108 (90 Base) MCG/ACT inhaler INHALE TWO PUFFS BY MOUTH EVERY 6 HOURS 6.7 g 0    albuterol (PROVENTIL) (2.5 MG/3ML) 0.083% neb solution INHALE ONE VIAL BY NEBULIZER EVERY 6 HOURS AS NEEDED FOR SHORTNESS OF BREATH OR WHEEZING Strength: (2.5 MG/3ML) 0.083% 75 mL 1    ALLERGY RELIEF CETIRIZINE 10 MG tablet TAKE ONE TABLET BY MOUTH EVERY  DAY AS NEEDED 90 tablet 1    benzoyl peroxide (ACNE-CLEAR) 10 % external gel Apply topically 2 times daily as needed 90 g 1    blood glucose (NO BRAND SPECIFIED) lancets standard Use to test blood sugar 2 times daily or as directed. 200 each 3    blood glucose (NO BRAND SPECIFIED) test strip Use to test blood sugar 2 times daily or as directed. 200 strip 3    blood glucose (NO BRAND SPECIFIED) test strip Use to test blood sugar 1 times daily or as directed.  Dispense Accu-chek Olinda Plus or per patient insurance 100 strip 3    blood glucose monitoring (NO BRAND SPECIFIED) meter device kit Use to test blood sugar 2 times daily or as directed. 1 kit 0    blood glucose monitoring (SOFTCLIX) lancets USE TO TEST BLOOD SUGAR TWICE DAILY OR AS DIRECTED 200 each 3    buPROPion (WELLBUTRIN XL) 150 MG 24 hr tablet TAKE ONE TABLET BY MOUTH EVERY MORNING 90 tablet 2    busPIRone (BUSPAR) 10 MG tablet Take 1 tablet by mouth 2 times daily.      clindamycin (CLINDAMAX) 1 % external gel Apply topically 2 times daily 60 g 3    clonazePAM (KLONOPIN) 1 MG tablet Take 1 tablet by mouth 2 times daily      clotrimazole (LOTRIMIN) 1 % external cream Apply topically 2 times daily 30 g 0    eszopiclone (LUNESTA) 2 MG tablet Take 2 mg by mouth at bedtime      fenofibrate (LOFIBRA) 54 MG tablet Take 1 tablet (54 mg) by mouth daily. 90 tablet 1    fluticasone (FLONASE) 50 MCG/ACT nasal spray Spray 1 spray into both nostrils daily. 16 g 5    Fluticasone-Umeclidin-Vilant (TRELEGY ELLIPTA) 100-62.5-25 MCG/ACT oral inhaler Inhale 1 puff into the lungs daily. 3 each 3    furosemide (LASIX) 20 MG tablet TAKE 1 TABLET BY MOUTH TWICE DAILY 180 tablet 3    gabapentin (NEURONTIN) 100 MG capsule TAKE ONE CAPSULE BY MOUTH THREE TIMES A DAY 90 capsule 4    hydrocortisone 2.5 % cream APPLY TOPICALLY TWO TIMES A DAY 30 g 0    hydrOXYzine HCl (ATARAX) 50 MG tablet TAKE TWO TABLETS BY MOUTH THREE TIMES A DAY AS NEEDED FOR ANXIETY 270 tablet 1    ibuprofen  (ADVIL/MOTRIN) 200 MG tablet Take 3 tablets (600 mg) by mouth every 6 hours as needed for moderate pain (4-6)      lamoTRIgine (LAMICTAL) 200 MG tablet Take 1 tablet by mouth 2 times daily      levothyroxine (SYNTHROID/LEVOTHROID) 50 MCG tablet TAKE ONE TABLET BY MOUTH EVERY DAY 90 tablet 2    lisinopril (ZESTRIL) 10 MG tablet Take 2 tablets (20 mg) by mouth daily. 180 tablet 1    metFORMIN (GLUCOPHAGE) 500 MG tablet TAKE ONE TABLET BY MOUTH EVERY DAY WITH BREAKFAST 90 tablet 1    montelukast (SINGULAIR) 10 MG tablet TAKE ONE TABLET BY MOUTH AT BEDTIME 90 tablet 3    multivitamin, therapeutic (THERA-VIT) TABS tablet Take 1 tablet by mouth daily      nebivolol (BYSTOLIC) 5 MG tablet TAKE ONE TABLET BY MOUTH EVERY DAY 30 tablet 2    nitroGLYcerin (NITROSTAT) 0.4 MG sublingual tablet PLACE ONE TABLET UNDER THE TONGUE AT THE 1ST SIGN OF ATTACK. IF PAIN IS UNRELIEVED OR WORSENED 5 MINUTES AFTER 1ST DOSE, PROMPT MEDICAL ASSISTANCE IS NEEDED. MAY REPEAT EVERY 5 MINUTES UNTIL PAIN IS RELIEVED. MAX OF THREE DOSES 25 tablet 4    nystatin (NYAMYC) 313863 UNIT/GM external powder APPLY TO AFFECTED AREA(S) TWO TIMES A  g 0    omeprazole (PRILOSEC) 40 MG DR capsule TAKE ONE CAPSULE BY MOUTH TWICE A DAY FOR 1 MONTH THEN TAKE ONE CAPSULE EVERY DAY 60 capsule 2    ondansetron (ZOFRAN ODT) 4 MG ODT tab Take 1 tablet (4 mg) by mouth every 8 hours as needed for nausea 4 tablet 0    OZEMPIC, 1 MG/DOSE, 4 MG/3ML pen INJECT 1MG UNDER THE SKIN EVERY 7 DAYS 3 mL 0    rosuvastatin (CRESTOR) 40 MG tablet Take 1 tablet (40 mg) by mouth daily. 90 tablet 2    triamcinolone (KENALOG) 0.1 % external cream Apply topically 2 times daily. 30 g 1    vitamin B-Complex Take 1 tablet by mouth daily. 90 tablet 1    vitamin B6 (PYRIDOXINE) 100 MG tablet Take 1 tablet (100 mg) by mouth daily. 90 tablet 4    Vitamin D3 (CHOLECALCIFEROL) 125 MCG (5000 UT) tablet Take 1 tablet by mouth three times a week      semaglutide (OZEMPIC, 0.25 OR 0.5 MG/DOSE,) 2  MG/3ML pen Inject 0.5 mg subcutaneously every 7 days. (Patient not taking: Reported on 5/28/2025) 3 mL 1    sucralfate (CARAFATE) 1 GM tablet TAKE ONE TABLET BY MOUTH FOUR TIMES A DAY AS NEEDED FOR NAUSEA (Patient not taking: Reported on 5/28/2025) 120 tablet 0       Soc Hx: reviewed  Fam Hx: reviewed      Chart documentation was completed, in part, with [employer] voice-recognition software. Even though reviewed, some grammatical, spelling, and word errors may remain.    The longitudinal plan of care for the diagnosis(es)/condition(s) as documented were addressed during this visit. Due to the added complexity in care, I will continue to support Rukhsana in the subsequent management and with ongoing continuity of care.      Roro Low MD  Internal Medicine           Signed Electronically by: Roro Chawla MD

## 2025-05-28 NOTE — NURSING NOTE
"/71   Pulse 74   Temp 97.1  F (36.2  C) (Tympanic)   Resp 16   Ht 1.626 m (5' 4\")   Wt 99.7 kg (219 lb 11.2 oz)   LMP 01/01/2014 (Approximate)   SpO2 95%   BMI 37.71 kg/m      "

## 2025-05-28 NOTE — PATIENT INSTRUCTIONS
Plan:  Increase Ozempic to 2 mg weekly  2. Hold Metformin for 2 weeks. If no low blood sugars, resume Metformin 1 tablet   3. Follow up appointment in Sept 4. Please make a lab appointment for non fasting labs  few days prior   5. Letter for apartment   6. Symbicort trial instead of Trelegy

## 2025-06-15 ENCOUNTER — HEALTH MAINTENANCE LETTER (OUTPATIENT)
Age: 55
End: 2025-06-15

## 2025-06-17 ENCOUNTER — TELEPHONE (OUTPATIENT)
Dept: INTERNAL MEDICINE | Facility: CLINIC | Age: 55
End: 2025-06-17
Payer: COMMERCIAL

## 2025-06-26 ENCOUNTER — VIRTUAL VISIT (OUTPATIENT)
Dept: PHARMACY | Facility: CLINIC | Age: 55
End: 2025-06-26
Payer: COMMERCIAL

## 2025-06-26 DIAGNOSIS — Z71.6 ENCOUNTER FOR SMOKING CESSATION COUNSELING: Primary | ICD-10-CM

## 2025-06-26 DIAGNOSIS — I49.3 PVC'S (PREMATURE VENTRICULAR CONTRACTIONS): ICD-10-CM

## 2025-06-26 DIAGNOSIS — J44.9 CHRONIC OBSTRUCTIVE PULMONARY DISEASE, UNSPECIFIED COPD TYPE (H): ICD-10-CM

## 2025-06-26 DIAGNOSIS — E11.9 TYPE 2 DIABETES MELLITUS WITHOUT COMPLICATION, WITHOUT LONG-TERM CURRENT USE OF INSULIN (H): ICD-10-CM

## 2025-06-26 DIAGNOSIS — J30.2 SEASONAL ALLERGIC RHINITIS, UNSPECIFIED TRIGGER: ICD-10-CM

## 2025-06-26 DIAGNOSIS — Z53.9 DIAGNOSIS NOT YET DEFINED: Primary | ICD-10-CM

## 2025-06-26 DIAGNOSIS — R60.9 EDEMA, UNSPECIFIED TYPE: ICD-10-CM

## 2025-06-26 DIAGNOSIS — I10 BENIGN ESSENTIAL HYPERTENSION: ICD-10-CM

## 2025-06-26 DIAGNOSIS — E78.00 PURE HYPERCHOLESTEROLEMIA: ICD-10-CM

## 2025-06-26 RX ORDER — METFORMIN HYDROCHLORIDE 500 MG/1
500 TABLET, EXTENDED RELEASE ORAL
Qty: 90 TABLET | Refills: 3 | Status: SHIPPED | OUTPATIENT
Start: 2025-06-26

## 2025-06-26 NOTE — PATIENT INSTRUCTIONS
"Recommendations from today's MTM visit:                                                       Add the metformin  mg daily back in to help stabilize your blood sugars  Increase acetaminophen 1000 mg four times daily to help with pain.    Follow-up: Return in about 3 months (around 9/26/2025) for Medication Therapy Management.    It was great speaking with you today.  I value your experience and would be very thankful for your time in providing feedback in our clinic survey. In the next few days, you may receive an email or text message from [employer] with a link to a survey related to your  clinical pharmacist.\"     To schedule another MTM appointment, please call the clinic directly or you may call the MTM scheduling line at [phone].    My Clinical Pharmacist's contact information:                                                      Please feel free to contact me with any questions or concerns you have.      Vicky Robbins, PharmD  Medication Therapy Management Pharmacist  Voicemail: [phone]     "

## 2025-06-26 NOTE — PROGRESS NOTES
Medication Therapy Management (MTM) Encounter    ASSESSMENT:                            Medication Adherence/Access: No issues identified.    Smoking cessation:  Progressing, wants to continue working on this on her own.     Type 2 Diabetes: A1c well within goal <7%.   Recommended adding metformin back in to help stabilize blood sugars and to possibly help with weight loss. Concerns for low blood sugars with metformin and Ozempic are minimal.     Hypertension/Lower Extremity Edema/PVCs/Chest Pain:  blood pressure at goal <140/90 mmHg.      Hyperlipidemia: Stable. LDL within goal.      Asthma/COPD/Allergies:   Stable.    Pain: Discussed gabapentin could possibly worsen concerns of dizziness and fatigue so did not recommend increasing this. Encouraged her to optimize acetaminophen use instead.    PLAN:                            Add the metformin  mg daily back in to help stabilize your blood sugars  Increase acetaminophen 1000 mg four times daily to help with pain.    Follow-up: Return in about 3 months (around 9/26/2025) for Medication Therapy Management.    SUBJECTIVE/OBJECTIVE:                          Rukhsana Patterson is a 54 year old female seen for a follow-up visit.       Reason for visit: diabetes.    Allergies/ADRs: Reviewed in chart  Past Medical History: Reviewed in chart  Tobacco: She reports that she has been smoking cigarettes. She has a 9 pack-year smoking history. She has been exposed to tobacco smoke. She has never used smokeless tobacco.Nicotine/Tobacco Cessation Plan  Information offered: Patient not interested at this time    Alcohol: not currently using    Medication Adherence/Access: Patient uses pill box(es). Set up by a nurse once a week.  Per patient, misses medication 0 time(s) per week.   Medication barriers: none.     Diabetes   Metformin 500 mg daily - reports been holding for a month at recommendation of PCP to make sure she wasn't having lows with Ozempic increase a month ago  Ozempic 2  mg weekly,    Patient is not experiencing side effects, occasional nausea but she had this before starting Ozempic. Reports that her weight initially decreased, but then halted a month ago. Also around when she stopped metformin.  Blood sugar monitorin-2 time(s) daily. Ranges (patient reported): Reports yesterday blood sugar was 136 mg/dL. Reports they have been going up and down. Today it was 97 mg/dL.     Eye exam in the last 12 months? No  Foot exam is up to date  Lab Results   Component Value Date    A1C 5.7 2025    A1C 5.7 07/10/2024    A1C 5.8 2024    A1C 5.5 2022       Hypertension   Hypertension/Lower Extremity Edema/PVCs/Chest Pain:   Lisinopril 20 mg daily   Furosemide 20 mg twice daily   Nebivolol 5 mg daily  Nitroglycerin SL as needed for chest pain - last needed about 2-3 weeks ago, often will need two to control chest pain  Reports swelling has been okay, does have swelling by the end of the day. Is working on elevating her feet more.  Nurse checks blood pressure once a week 130-140/80-90's most recently.  Reports is looking for a new cardiologist, doesn't like her current cardiologist.     BP Readings from Last 3 Encounters:   25 128/71   04/15/25 118/78   25 122/82       Hyperlipidemia   Rosuvastatin 40 mg daily  Fenofibrate 54 mg daily - added after last labs  Patient reports no significant myalgias or other side effects.     Recent Labs   Lab Test 25  0949 24  1039   CHOL 228* 221*   HDL 42* 47*   * 128*   TRIG 449* 228*       Asthma   Asthma/COPD/Allergies:   Symbicort 2 puffs twice daily - reports this works better for her than Trelegy  Albuterol inhaler and nebs - reports she usually uses 1-2 times a day, reports gets out of breath when running around  Montelukast 10 mg daily   Flonase at bedtime - has been helpful, especially with her cold  Cetirizine as needed - last visit discussed stopping this due to already taking hydroxyzine and  possibly this contributing to memory, but restarted by PCP due to allergies.  Reports she does have difficult with breathing. Feels short of breath often. Reports a mix of smoking and weight.   Patient rinses their mouth after using steroid inhaler.   Patient is experiencing the following side effects: none.  Reports no sputum production.        Pain   Gabapentin 100 mg three times daily   Acetaminophen 1000 mg as needed - only using about twice a day  Ibuprofen as needed - not needed in a long time  Source of pain is fibromyalgia. Reports gabapentin helps her legs a lot. Reports her pain has been a lot worse since min-stroke.  Still has concerns with fatigue, dizziness, balance, and stutter, mini-stroke made some of these worse.         Today's Vitals: LMP 01/01/2014 (Approximate)   ----------------      I spent 36 minutes with this patient today. All changes were made via collaborative practice agreement with Roro Chawla MD.     A summary of these recommendations was sent via [employer].    Vicky Robbins, PharmD  Medication Therapy Management Pharmacist  Voicemail: [phone]      Telemedicine Visit Details  The patient's medications can be safely assessed via a telemedicine encounter.  Type of service:  Telephone visit  Originating Location (pt. Location): Home    Distant Location (provider location):  On-site  Start Time: 12:30 PM  End Time: 1:06 PM     Medication Therapy Recommendations  Chronic abdominal pain   1 Current Medication: acetaminophen (TYLENOL) 500 MG tablet   Current Medication Sig: Take 2 tablets (1,000 mg) by mouth every 6 hours as needed for pain   Rationale: Frequency inappropriate - Dosage too low - Effectiveness   Recommendation: Increase Frequency   Status: Accepted - no CPA Needed   Identified Date: 6/26/2025 Completed Date: 6/26/2025         Type 2 diabetes mellitus without complication, without long-term current use of insulin (H)   1 Current Medication: metFORMIN  (GLUCOPHAGE XR) 500 MG 24 hr tablet   Current Medication Sig: Take 1 tablet (500 mg) by mouth every morning (before breakfast).   Rationale: Synergistic therapy - Needs additional medication therapy - Indication   Recommendation: Start Medication   Status: Accepted per CPA   Identified Date: 6/26/2025 Completed Date: 6/26/2025

## 2025-06-28 DIAGNOSIS — J44.9 COPD, MODERATE (H): ICD-10-CM

## 2025-07-01 RX ORDER — FLUTICASONE PROPIONATE 50 MCG
SPRAY, SUSPENSION (ML) NASAL
Qty: 16 G | Refills: 5 | Status: SHIPPED | OUTPATIENT
Start: 2025-07-01

## 2025-07-02 ENCOUNTER — TRANSFERRED RECORDS (OUTPATIENT)
Dept: HEALTH INFORMATION MANAGEMENT | Facility: CLINIC | Age: 55
End: 2025-07-02
Payer: COMMERCIAL

## 2025-07-08 DIAGNOSIS — I10 HTN, GOAL BELOW 140/90: ICD-10-CM

## 2025-07-08 DIAGNOSIS — J44.9 COPD, MODERATE (H): ICD-10-CM

## 2025-07-08 RX ORDER — ALBUTEROL SULFATE 90 UG/1
2 INHALANT RESPIRATORY (INHALATION) EVERY 6 HOURS
Qty: 8.5 G | Refills: 1 | Status: SHIPPED | OUTPATIENT
Start: 2025-07-08

## 2025-07-08 RX ORDER — LISINOPRIL 10 MG/1
20 TABLET ORAL DAILY
Qty: 180 TABLET | Refills: 1 | Status: SHIPPED | OUTPATIENT
Start: 2025-07-08

## 2025-07-15 ENCOUNTER — TELEPHONE (OUTPATIENT)
Dept: INTERNAL MEDICINE | Facility: CLINIC | Age: 55
End: 2025-07-15
Payer: COMMERCIAL

## 2025-07-15 NOTE — TELEPHONE ENCOUNTER
"  General Call      Reason for Call:  meds questions     What are your questions or concerns:  patient says her numbers are low, \"can I take sugar?\" Before writer could ask clarifying questions the patient begins to be teary an apologize for her slow speech due to her stroke. Patient was asked to speak with an RN but refused because she said \"they will tell me to go to the ER and I wont go\". Writer made the patient a video visit appointment for 7-.       Date of last appointment with provider: 5-. Next office visit 7- video visit .     Could we send this information to you in [place] or would you prefer to receive a phone call?:   Patient would prefer a phone call    Okay to leave a detailed message?: No at Cell number on file:    Telephone Information:   Mobile [phone]       "

## 2025-07-15 NOTE — TELEPHONE ENCOUNTER
Attempted to call patient to get more information/triage her symptosm.  No answer at home/cell number and voice mail box has not yet been set up. SERJIO Chandra R.N.

## 2025-07-16 NOTE — TELEPHONE ENCOUNTER
Patient has appointment with provider today. Can discuss with provider at appointment.    Appointments in Next Year      Jul 16, 2025 1:00 PM  (Arrive by 12:55 PM)  Provider Visit with LEIGH Long CNP  [facility] ([facility] [place] ) [phone]     Thank you,  Benedict Triage NNEKA [facility]    11:06 AM 7/16/2025

## 2025-07-23 DIAGNOSIS — Z71.6 ENCOUNTER FOR SMOKING CESSATION COUNSELING: ICD-10-CM

## 2025-07-23 RX ORDER — BUPROPION HYDROCHLORIDE 150 MG/1
150 TABLET ORAL EVERY MORNING
Qty: 90 TABLET | Refills: 1 | Status: SHIPPED | OUTPATIENT
Start: 2025-07-23

## 2025-07-24 DIAGNOSIS — K21.9 GASTROESOPHAGEAL REFLUX DISEASE WITHOUT ESOPHAGITIS: ICD-10-CM

## 2025-07-24 NOTE — TELEPHONE ENCOUNTER
Clinic RN: Please investigate patient's chart or contact patient if the information cannot be found because the last prescription was a taper dose (not in RN protocol). We need to know what dose patient is taking. Determine the current dose, then route to provider and ask provider to update the SIG and approve or deny the prescription.    Sarah Borges RN on 7/24/2025 at 9:20 AM

## 2025-07-28 NOTE — TELEPHONE ENCOUNTER
Attempt # 3  Called Phone # [phone]      Was Call answered? Yes.     Call to patient who states she is taking the prescription twice per day still.     Routing refill to primary care provider.     Thank you,  Benedict, Triage RN [facility]    8:54 AM 7/28/2025

## 2025-07-29 RX ORDER — OMEPRAZOLE 40 MG/1
CAPSULE, DELAYED RELEASE ORAL
Qty: 60 CAPSULE | Refills: 2 | Status: SHIPPED | OUTPATIENT
Start: 2025-07-29

## 2025-07-31 DIAGNOSIS — J44.9 COPD, MODERATE (H): ICD-10-CM

## 2025-07-31 DIAGNOSIS — J30.89 DUST ALLERGY: ICD-10-CM

## 2025-07-31 DIAGNOSIS — E78.5 HYPERLIPIDEMIA LDL GOAL <100: ICD-10-CM

## 2025-07-31 RX ORDER — MONTELUKAST SODIUM 10 MG/1
1 TABLET ORAL
Qty: 90 TABLET | Refills: 2 | Status: SHIPPED | OUTPATIENT
Start: 2025-07-31

## 2025-07-31 RX ORDER — FENOFIBRATE 54 MG/1
54 TABLET ORAL DAILY
Qty: 90 TABLET | Refills: 2 | Status: SHIPPED | OUTPATIENT
Start: 2025-07-31

## 2025-08-10 DIAGNOSIS — I49.3 PVC'S (PREMATURE VENTRICULAR CONTRACTIONS): ICD-10-CM

## 2025-08-11 RX ORDER — NEBIVOLOL 5 MG/1
5 TABLET ORAL DAILY
Qty: 30 TABLET | Refills: 9 | Status: SHIPPED | OUTPATIENT
Start: 2025-08-11

## 2025-08-12 ENCOUNTER — TELEPHONE (OUTPATIENT)
Dept: INTERNAL MEDICINE | Facility: CLINIC | Age: 55
End: 2025-08-12
Payer: COMMERCIAL

## 2025-08-14 ENCOUNTER — TRANSFERRED RECORDS (OUTPATIENT)
Dept: HEALTH INFORMATION MANAGEMENT | Facility: CLINIC | Age: 55
End: 2025-08-14

## 2025-08-18 ENCOUNTER — TELEPHONE (OUTPATIENT)
Dept: INTERNAL MEDICINE | Facility: CLINIC | Age: 55
End: 2025-08-18
Payer: COMMERCIAL

## 2025-08-20 DIAGNOSIS — R10.13 EPIGASTRIC PAIN: ICD-10-CM

## 2025-08-21 RX ORDER — SUCRALFATE 1 G/1
TABLET ORAL
Qty: 120 TABLET | Refills: 0 | Status: SHIPPED | OUTPATIENT
Start: 2025-08-21

## 2025-08-27 ENCOUNTER — NURSE TRIAGE (OUTPATIENT)
Dept: INTERNAL MEDICINE | Facility: CLINIC | Age: 55
End: 2025-08-27
Payer: COMMERCIAL

## 2025-08-27 DIAGNOSIS — R21 RASH: ICD-10-CM

## 2025-08-28 ENCOUNTER — TELEPHONE (OUTPATIENT)
Dept: INTERNAL MEDICINE | Facility: CLINIC | Age: 55
End: 2025-08-28

## 2025-08-28 DIAGNOSIS — Z53.9 DIAGNOSIS NOT YET DEFINED: Primary | ICD-10-CM

## 2025-08-30 RX ORDER — NYSTATIN 100000 [USP'U]/G
POWDER TOPICAL 2 TIMES DAILY
Qty: 120 G | Refills: 0 | Status: SHIPPED | OUTPATIENT
Start: 2025-08-30

## 2025-09-02 DIAGNOSIS — F41.9 ANXIETY: ICD-10-CM

## 2025-09-02 RX ORDER — HYDROXYZINE HYDROCHLORIDE 50 MG/1
TABLET, FILM COATED ORAL
Qty: 270 TABLET | Refills: 1 | Status: SHIPPED | OUTPATIENT
Start: 2025-09-02

## (undated) DEVICE — SU MONOCRYL 4-0 PS-2 27" UND Y426H

## (undated) DEVICE — Device

## (undated) DEVICE — GLOVE PROTEXIS BLUE W/NEU-THERA 6.5  2D73EB65

## (undated) DEVICE — LINEN DRAPE 54X72" 5467

## (undated) DEVICE — PAD CHUX UNDERPAD 30X36" P3036C

## (undated) DEVICE — ADH SKIN CLOSURE PREMIERPRO EXOFIN 1.0ML 3470

## (undated) DEVICE — SU VICRYL 3-0 SH 27" UND J416H

## (undated) DEVICE — GLOVE ESTEEM POWDER FREE SMT 6.5  2D72PT65

## (undated) DEVICE — GOWN XLG DISP 9545

## (undated) DEVICE — KIT ENDO FIRST STEP DISINFECTANT 200ML W/POUCH EP-4

## (undated) DEVICE — CATH URETERAL OPEN END 5FRX70CM M0064002010

## (undated) DEVICE — SYR 01ML 25GA 5/8"

## (undated) DEVICE — SUCTION TIP YANKAUER W/O VENT K86

## (undated) DEVICE — GOWN IMPERVIOUS BREATHABLE SMART XLG 89045

## (undated) DEVICE — GLOVE BIOGEL PI MICRO INDICATOR UNDERGLOVE SZ 6.5 48965

## (undated) DEVICE — LINEN HALF SHEET 5512

## (undated) DEVICE — PIN DISTRACTION ANCHOR FOR SCR 14MM MDS9091414

## (undated) DEVICE — TUBING SUCTION 12"X1/4" N612

## (undated) DEVICE — KIT PROCEDURE W/CLEAN-A-SCOPE LINERS V2 200800

## (undated) DEVICE — BAG CLEAR TRASH 1.3M 39X33" P4040C

## (undated) DEVICE — PREP POVIDONE IODINE SOLUTION 10% 4OZ BOTTLE 29906-004

## (undated) DEVICE — BAG RED BIOHAZARD 30.5X43" G4300XR

## (undated) DEVICE — SPONGE KITTNER 30-101

## (undated) DEVICE — ADH SKIN CLOSURE PREMIERPRO EXOFIN MICOR HV 0.5ML 3471

## (undated) DEVICE — SOL NACL 0.9% IRRIG 1000ML BOTTLE 07138-09

## (undated) DEVICE — GLOVE BIOGEL PI MICRO SZ 6.5 48565

## (undated) DEVICE — PACK MINOR CUSTOM RIDGES SBA32RMRMA

## (undated) DEVICE — GLOVE PROTEXIS W/NEU-THERA 8.0  2D73TE80

## (undated) DEVICE — TUBING SUCTION MEDI-VAC 1/4"X20' N620A - HE

## (undated) DEVICE — ESU GROUND PAD ADULT W/CORD E7507

## (undated) DEVICE — TAPE DURAPORE 3" SILK 1538-3

## (undated) DEVICE — CONTAINER URINE SPEC 4OZ STRL 1053

## (undated) DEVICE — LINEN POUCH DBL 5427

## (undated) DEVICE — GLOVE PROTEXIS POWDER FREE 8.0 ORTHOPEDIC 2D73ET80

## (undated) DEVICE — DRAIN JACKSON PRATT 10FR ROUND SU130-1321

## (undated) DEVICE — PREP SKIN SCRUB TRAY 4461A

## (undated) DEVICE — ENDO POUCH GOLD 10MM ECATCH 173050G

## (undated) DEVICE — SOL NACL 0.9% 20ML VIAL

## (undated) DEVICE — CUSTOM PACK CYSTO PREFERRED SOT5BCYHEA

## (undated) DEVICE — ESU ELEC BLADE 2.75" COATED/INSULATED E1455

## (undated) DEVICE — SYR 10ML SLIP TIP W/O NDL 303134

## (undated) DEVICE — LINEN FULL SHEET 5511

## (undated) DEVICE — TUBING SUCTION MEDI-VAC SOFT 3/16"X20' N520A

## (undated) DEVICE — DRAPE LAP PEDS DISP 29492

## (undated) DEVICE — BAG RED BIOHAZARD 37X50" 40GAL A7450PR

## (undated) DEVICE — SU VICRYL 3-0 CT-2 27" UND J232H

## (undated) DEVICE — SUCTION CANISTER MEDIVAC LINER 3000ML W/LID 65651-530

## (undated) DEVICE — SYR 10ML LL W/O NDL 302995

## (undated) DEVICE — GUIDEWIRE SENSOR DUAL FLEX STR 0.035"X150CM M0066703080

## (undated) DEVICE — DRAPE STERI APERATURE 15X15" 1020

## (undated) DEVICE — PREP POVIDONE IODINE SCRUB 7.5% 4OZ APL82212

## (undated) DEVICE — GLOVE PROTEXIS POWDER FREE SMT 7.5  2D72PT75X

## (undated) DEVICE — NDL 22GA 1.5"

## (undated) DEVICE — PREP DYNA-HEX 4% CHG SCRUB 4OZ BOTTLE MDS098710

## (undated) DEVICE — PREP CHLORAPREP 26ML TINTED ORANGE  260815

## (undated) DEVICE — SOL NACL 0.9% IRRIG 3000ML BAG 2B7477

## (undated) DEVICE — SOL NACL 0.9% IRRIG 1000ML BOTTLE 2F7124

## (undated) DEVICE — MAT FLOOR SURGICAL 40X38 0702140238

## (undated) DEVICE — ENDO FORCEP ENDOJAW BIOPSY 2.8MMX160CM FB-220K

## (undated) DEVICE — DRAPE C-ARMOR 5 SIDED 5523

## (undated) DEVICE — DRAPE STERI TOWEL LG 1010

## (undated) DEVICE — GLOVE PROTEXIS W/NEU-THERA 6.5  2D73TE65

## (undated) DEVICE — SOL WATER IRRIG 1000ML BOTTLE 2F7114

## (undated) DEVICE — LINEN TOWEL PACK X5 5464

## (undated) DEVICE — GLOVE PROTEXIS POWDER FREE 8.5 ORTHOPEDIC 2D73ET85

## (undated) DEVICE — SU VICRYL 4-0 PS-2 18" UND J496H

## (undated) DEVICE — SOLUTION IRRIG 2B7127 .9NS 3000ML BAG

## (undated) DEVICE — SUCTION FRAZIER 12FR W/OBTURATOR 33120

## (undated) DEVICE — SPONGE SURGIFOAM 12 1972

## (undated) DEVICE — LINEN ORTHO ACL PACK 5447

## (undated) DEVICE — NDL SPINAL 25GA 3.5" QUINCKE 405180

## (undated) DEVICE — DRSG TEGADERM 4X4 3/4" 1626W

## (undated) DEVICE — SU DERMABOND MINI DHVM12

## (undated) DEVICE — SU ETHILON 3-0 PS-2 18" 1669H

## (undated) DEVICE — ADAPTER CATH CHECK-FLO 9FR FLL 050885 G15476

## (undated) DEVICE — LINEN TOWEL PACK X10 5473

## (undated) DEVICE — PACK SMALL SPINE RIDGES

## (undated) DEVICE — GEL ULTRASOUND AQUASONIC 20GM 01-01

## (undated) DEVICE — DRSG TELFA 2X3"

## (undated) DEVICE — ENDO CANNULA 05MM VERSAONE UNIVERSAL UNVCA5STF

## (undated) DEVICE — SPONGE COTTONOID 1/2X1/2" 80-1400

## (undated) DEVICE — DRSG STERI STRIP 1/2X4" R1547

## (undated) DEVICE — SU VICRYL 4-0 P-3 18" UND J494G

## (undated) DEVICE — DRAPE IOBAN INCISE 23X17" 6650EZ

## (undated) DEVICE — SU VICRYL 0 CT-2 CR 8X18" J727D

## (undated) DEVICE — SYR 10ML FINGER CONTROL W/O NDL 309695

## (undated) DEVICE — SU SILK 3-0 TIE 24" SA74H

## (undated) DEVICE — ESU ELEC NDL 1" COATED/INSULATED E1465

## (undated) DEVICE — DRAIN JACKSON PRATT RESERVOIR 100ML SU130-1305

## (undated) DEVICE — ESU PENCIL W/HOLSTER E2350H

## (undated) DEVICE — ENDO TROCAR BLUNT 100MM W/THRD ANCHOR BLUNTPORT BPT12STS

## (undated) DEVICE — DRAPE X-RAY TUBE 00-901169-01-OEC

## (undated) DEVICE — SUCTION MANIFOLD NEPTUNE 2 SYS 1 PORT 702-025-000

## (undated) DEVICE — SUCTION IRR STRYKERFLOW II W/TIP 250-070-520

## (undated) DEVICE — ENDO FORCEP ENDOJAW BIOPSY 2.8MMX230CM FB-220U

## (undated) DEVICE — DECANTER VIAL 2006S

## (undated) DEVICE — SUCTION MANIFOLD NEPTUNE 2 SYS 4 PORT 0702-020-000

## (undated) DEVICE — DRAPE LAP W/ARMBOARD 29410

## (undated) DEVICE — SPONGE SURGIFOAM 01GM POWDER 1978

## (undated) DEVICE — DRAPE MICROSCOPE OPMI ZEISS 48X118" 306071-0000-000

## (undated) DEVICE — MIDAS REX DISSECTING TOOL  14MH30

## (undated) DEVICE — TUBING SET THERMEDX UROLOGY SGL USE LL0006

## (undated) DEVICE — DRSG ADAPTIC 3X3" 6112

## (undated) DEVICE — ESU CORD MONOPOLAR 10'  E0510

## (undated) DEVICE — ENDO TROCAR 05MM VERSAONE BLADELESS W/STD FIX CAN NONB5STF

## (undated) DEVICE — DRAPE LEGGINGS 8421

## (undated) DEVICE — CATH TRAY FOLEY SURESTEP 16FR W/URNE MTR STLK LATEX A303316A

## (undated) DEVICE — GOWN IMPERVIOUS ZONED XLG 9041

## (undated) RX ORDER — LIDOCAINE HYDROCHLORIDE 10 MG/ML
INJECTION, SOLUTION EPIDURAL; INFILTRATION; INTRACAUDAL; PERINEURAL
Status: DISPENSED
Start: 2021-11-03

## (undated) RX ORDER — ONDANSETRON 2 MG/ML
INJECTION INTRAMUSCULAR; INTRAVENOUS
Status: DISPENSED
Start: 2023-01-20

## (undated) RX ORDER — KETOROLAC TROMETHAMINE 30 MG/ML
INJECTION, SOLUTION INTRAMUSCULAR; INTRAVENOUS
Status: DISPENSED
Start: 2017-10-19

## (undated) RX ORDER — HYDROMORPHONE HYDROCHLORIDE 1 MG/ML
INJECTION, SOLUTION INTRAMUSCULAR; INTRAVENOUS; SUBCUTANEOUS
Status: DISPENSED
Start: 2020-03-11

## (undated) RX ORDER — PROPOFOL 10 MG/ML
INJECTION, EMULSION INTRAVENOUS
Status: DISPENSED
Start: 2025-02-27

## (undated) RX ORDER — FENTANYL CITRATE-0.9 % NACL/PF 10 MCG/ML
PLASTIC BAG, INJECTION (ML) INTRAVENOUS
Status: DISPENSED
Start: 2025-02-27

## (undated) RX ORDER — PROPOFOL 10 MG/ML
INJECTION, EMULSION INTRAVENOUS
Status: DISPENSED
Start: 2021-10-07

## (undated) RX ORDER — SCOLOPAMINE TRANSDERMAL SYSTEM 1 MG/1
PATCH, EXTENDED RELEASE TRANSDERMAL
Status: DISPENSED
Start: 2023-01-20

## (undated) RX ORDER — GLYCOPYRROLATE 0.2 MG/ML
INJECTION, SOLUTION INTRAMUSCULAR; INTRAVENOUS
Status: DISPENSED
Start: 2024-08-22

## (undated) RX ORDER — PROPOFOL 10 MG/ML
INJECTION, EMULSION INTRAVENOUS
Status: DISPENSED
Start: 2017-10-19

## (undated) RX ORDER — LORAZEPAM 2 MG/ML
INJECTION INTRAMUSCULAR
Status: DISPENSED
Start: 2023-02-01

## (undated) RX ORDER — FENTANYL CITRATE 50 UG/ML
INJECTION, SOLUTION INTRAMUSCULAR; INTRAVENOUS
Status: DISPENSED
Start: 2020-03-11

## (undated) RX ORDER — SCOLOPAMINE TRANSDERMAL SYSTEM 1 MG/1
PATCH, EXTENDED RELEASE TRANSDERMAL
Status: DISPENSED
Start: 2020-10-22

## (undated) RX ORDER — LIDOCAINE HYDROCHLORIDE 10 MG/ML
INJECTION, SOLUTION EPIDURAL; INFILTRATION; INTRACAUDAL; PERINEURAL
Status: DISPENSED
Start: 2017-10-19

## (undated) RX ORDER — DEXMEDETOMIDINE HYDROCHLORIDE 4 UG/ML
INJECTION, SOLUTION INTRAVENOUS
Status: DISPENSED
Start: 2023-02-01

## (undated) RX ORDER — PROPOFOL 10 MG/ML
INJECTION, EMULSION INTRAVENOUS
Status: DISPENSED
Start: 2020-10-22

## (undated) RX ORDER — FENTANYL CITRATE 50 UG/ML
INJECTION, SOLUTION INTRAMUSCULAR; INTRAVENOUS
Status: DISPENSED
Start: 2018-05-01

## (undated) RX ORDER — GLYCOPYRROLATE 0.2 MG/ML
INJECTION INTRAMUSCULAR; INTRAVENOUS
Status: DISPENSED
Start: 2018-05-01

## (undated) RX ORDER — OXYCODONE HYDROCHLORIDE 5 MG/1
TABLET ORAL
Status: DISPENSED
Start: 2020-03-11

## (undated) RX ORDER — DEXAMETHASONE SODIUM PHOSPHATE 4 MG/ML
INJECTION, SOLUTION INTRA-ARTICULAR; INTRALESIONAL; INTRAMUSCULAR; INTRAVENOUS; SOFT TISSUE
Status: DISPENSED
Start: 2024-08-22

## (undated) RX ORDER — FENTANYL CITRATE-0.9 % NACL/PF 10 MCG/ML
PLASTIC BAG, INJECTION (ML) INTRAVENOUS
Status: DISPENSED
Start: 2024-08-22

## (undated) RX ORDER — CEFAZOLIN SODIUM/WATER 2 G/20 ML
SYRINGE (ML) INTRAVENOUS
Status: DISPENSED
Start: 2021-11-03

## (undated) RX ORDER — ONDANSETRON 2 MG/ML
INJECTION INTRAMUSCULAR; INTRAVENOUS
Status: DISPENSED
Start: 2021-11-03

## (undated) RX ORDER — BUPIVACAINE HYDROCHLORIDE 5 MG/ML
INJECTION, SOLUTION EPIDURAL; INTRACAUDAL
Status: DISPENSED
Start: 2023-02-01

## (undated) RX ORDER — NEOSTIGMINE METHYLSULFATE 1 MG/ML
VIAL (ML) INJECTION
Status: DISPENSED
Start: 2018-05-01

## (undated) RX ORDER — EPHEDRINE SULFATE 50 MG/ML
INJECTION, SOLUTION INTRAMUSCULAR; INTRAVENOUS; SUBCUTANEOUS
Status: DISPENSED
Start: 2021-10-07

## (undated) RX ORDER — PHENYLEPHRINE HCL IN 0.9% NACL 1 MG/10 ML
SYRINGE (ML) INTRAVENOUS
Status: DISPENSED
Start: 2018-05-01

## (undated) RX ORDER — FENTANYL CITRATE 50 UG/ML
INJECTION, SOLUTION INTRAMUSCULAR; INTRAVENOUS
Status: DISPENSED
Start: 2020-10-22

## (undated) RX ORDER — ONDANSETRON 4 MG/1
TABLET, ORALLY DISINTEGRATING ORAL
Status: DISPENSED
Start: 2020-10-22

## (undated) RX ORDER — LIDOCAINE HYDROCHLORIDE 10 MG/ML
INJECTION, SOLUTION EPIDURAL; INFILTRATION; INTRACAUDAL; PERINEURAL
Status: DISPENSED
Start: 2024-08-22

## (undated) RX ORDER — SCOLOPAMINE TRANSDERMAL SYSTEM 1 MG/1
PATCH, EXTENDED RELEASE TRANSDERMAL
Status: DISPENSED
Start: 2021-10-07

## (undated) RX ORDER — ONDANSETRON 2 MG/ML
INJECTION INTRAMUSCULAR; INTRAVENOUS
Status: DISPENSED
Start: 2018-05-01

## (undated) RX ORDER — FENTANYL CITRATE 50 UG/ML
INJECTION, SOLUTION INTRAMUSCULAR; INTRAVENOUS
Status: DISPENSED
Start: 2023-02-01

## (undated) RX ORDER — PROMETHAZINE HYDROCHLORIDE 25 MG/ML
INJECTION, SOLUTION INTRAMUSCULAR; INTRAVENOUS
Status: DISPENSED
Start: 2017-10-19

## (undated) RX ORDER — CEFAZOLIN SODIUM 2 G/100ML
INJECTION, SOLUTION INTRAVENOUS
Status: DISPENSED
Start: 2017-10-19

## (undated) RX ORDER — OXYCODONE HYDROCHLORIDE 5 MG/1
TABLET ORAL
Status: DISPENSED
Start: 2021-11-03

## (undated) RX ORDER — SCOLOPAMINE TRANSDERMAL SYSTEM 1 MG/1
PATCH, EXTENDED RELEASE TRANSDERMAL
Status: DISPENSED
Start: 2021-11-03

## (undated) RX ORDER — EPHEDRINE SULFATE 50 MG/ML
INJECTION, SOLUTION INTRAVENOUS
Status: DISPENSED
Start: 2021-11-03

## (undated) RX ORDER — FENTANYL CITRATE-0.9 % NACL/PF 10 MCG/ML
PLASTIC BAG, INJECTION (ML) INTRAVENOUS
Status: DISPENSED
Start: 2021-10-07

## (undated) RX ORDER — PROPOFOL 10 MG/ML
INJECTION, EMULSION INTRAVENOUS
Status: DISPENSED
Start: 2020-03-11

## (undated) RX ORDER — EPHEDRINE SULFATE 50 MG/ML
INJECTION, SOLUTION INTRAMUSCULAR; INTRAVENOUS; SUBCUTANEOUS
Status: DISPENSED
Start: 2020-03-11

## (undated) RX ORDER — DEXAMETHASONE SODIUM PHOSPHATE 4 MG/ML
INJECTION, SOLUTION INTRA-ARTICULAR; INTRALESIONAL; INTRAMUSCULAR; INTRAVENOUS; SOFT TISSUE
Status: DISPENSED
Start: 2018-05-01

## (undated) RX ORDER — PROPOFOL 10 MG/ML
INJECTION, EMULSION INTRAVENOUS
Status: DISPENSED
Start: 2023-02-01

## (undated) RX ORDER — FENTANYL CITRATE 50 UG/ML
INJECTION, SOLUTION INTRAMUSCULAR; INTRAVENOUS
Status: DISPENSED
Start: 2025-02-27

## (undated) RX ORDER — PROPOFOL 10 MG/ML
INJECTION, EMULSION INTRAVENOUS
Status: DISPENSED
Start: 2018-05-01

## (undated) RX ORDER — GLYCOPYRROLATE 0.2 MG/ML
INJECTION INTRAMUSCULAR; INTRAVENOUS
Status: DISPENSED
Start: 2021-10-07

## (undated) RX ORDER — SCOLOPAMINE TRANSDERMAL SYSTEM 1 MG/1
PATCH, EXTENDED RELEASE TRANSDERMAL
Status: DISPENSED
Start: 2017-10-19

## (undated) RX ORDER — BUPIVACAINE HYDROCHLORIDE 5 MG/ML
INJECTION, SOLUTION EPIDURAL; INTRACAUDAL
Status: DISPENSED
Start: 2021-11-03

## (undated) RX ORDER — ALBUTEROL SULFATE 0.83 MG/ML
SOLUTION RESPIRATORY (INHALATION)
Status: DISPENSED
Start: 2023-02-01

## (undated) RX ORDER — SCOPOLAMINE 1 MG/3D
PATCH, EXTENDED RELEASE TRANSDERMAL
Status: DISPENSED
Start: 2025-02-27

## (undated) RX ORDER — ONDANSETRON 2 MG/ML
INJECTION INTRAMUSCULAR; INTRAVENOUS
Status: DISPENSED
Start: 2017-10-19

## (undated) RX ORDER — ONDANSETRON 2 MG/ML
INJECTION INTRAMUSCULAR; INTRAVENOUS
Status: DISPENSED
Start: 2020-10-22

## (undated) RX ORDER — KETAMINE HCL IN 0.9 % NACL 50 MG/5 ML
SYRINGE (ML) INTRAVENOUS
Status: DISPENSED
Start: 2018-11-26

## (undated) RX ORDER — LIDOCAINE HYDROCHLORIDE 10 MG/ML
INJECTION, SOLUTION EPIDURAL; INFILTRATION; INTRACAUDAL; PERINEURAL
Status: DISPENSED
Start: 2023-02-01

## (undated) RX ORDER — HYDROMORPHONE HYDROCHLORIDE 1 MG/ML
INJECTION, SOLUTION INTRAMUSCULAR; INTRAVENOUS; SUBCUTANEOUS
Status: DISPENSED
Start: 2017-10-19

## (undated) RX ORDER — FENTANYL CITRATE 50 UG/ML
INJECTION, SOLUTION INTRAMUSCULAR; INTRAVENOUS
Status: DISPENSED
Start: 2024-08-22

## (undated) RX ORDER — DEXAMETHASONE SODIUM PHOSPHATE 4 MG/ML
INJECTION, SOLUTION INTRA-ARTICULAR; INTRALESIONAL; INTRAMUSCULAR; INTRAVENOUS; SOFT TISSUE
Status: DISPENSED
Start: 2021-10-07

## (undated) RX ORDER — EPHEDRINE SULFATE 50 MG/ML
INJECTION, SOLUTION INTRAVENOUS
Status: DISPENSED
Start: 2023-02-01

## (undated) RX ORDER — SCOLOPAMINE TRANSDERMAL SYSTEM 1 MG/1
PATCH, EXTENDED RELEASE TRANSDERMAL
Status: DISPENSED
Start: 2018-05-01

## (undated) RX ORDER — ONDANSETRON 2 MG/ML
INJECTION INTRAMUSCULAR; INTRAVENOUS
Status: DISPENSED
Start: 2020-03-11

## (undated) RX ORDER — SCOLOPAMINE TRANSDERMAL SYSTEM 1 MG/1
PATCH, EXTENDED RELEASE TRANSDERMAL
Status: DISPENSED
Start: 2021-06-17

## (undated) RX ORDER — FENTANYL CITRATE 50 UG/ML
INJECTION, SOLUTION INTRAMUSCULAR; INTRAVENOUS
Status: DISPENSED
Start: 2017-10-19

## (undated) RX ORDER — CEFAZOLIN SODIUM 2 G/100ML
INJECTION, SOLUTION INTRAVENOUS
Status: DISPENSED
Start: 2018-05-01

## (undated) RX ORDER — SCOLOPAMINE TRANSDERMAL SYSTEM 1 MG/1
PATCH, EXTENDED RELEASE TRANSDERMAL
Status: DISPENSED
Start: 2023-02-01

## (undated) RX ORDER — CEFAZOLIN SODIUM 1 G/3ML
INJECTION, POWDER, FOR SOLUTION INTRAMUSCULAR; INTRAVENOUS
Status: DISPENSED
Start: 2018-05-01

## (undated) RX ORDER — FENTANYL CITRATE 50 UG/ML
INJECTION, SOLUTION INTRAMUSCULAR; INTRAVENOUS
Status: DISPENSED
Start: 2021-10-07

## (undated) RX ORDER — DEXAMETHASONE SODIUM PHOSPHATE 4 MG/ML
INJECTION, SOLUTION INTRA-ARTICULAR; INTRALESIONAL; INTRAMUSCULAR; INTRAVENOUS; SOFT TISSUE
Status: DISPENSED
Start: 2025-02-27

## (undated) RX ORDER — CEFAZOLIN SODIUM 2 G/100ML
INJECTION, SOLUTION INTRAVENOUS
Status: DISPENSED
Start: 2020-03-11

## (undated) RX ORDER — EPHEDRINE SULFATE 50 MG/ML
INJECTION, SOLUTION INTRAMUSCULAR; INTRAVENOUS; SUBCUTANEOUS
Status: DISPENSED
Start: 2023-02-01

## (undated) RX ORDER — BUPIVACAINE HYDROCHLORIDE 5 MG/ML
INJECTION, SOLUTION EPIDURAL; INTRACAUDAL
Status: DISPENSED
Start: 2020-03-11

## (undated) RX ORDER — ALBUTEROL SULFATE 90 UG/1
AEROSOL, METERED RESPIRATORY (INHALATION)
Status: DISPENSED
Start: 2018-05-01

## (undated) RX ORDER — ALBUTEROL SULFATE 90 UG/1
AEROSOL, METERED RESPIRATORY (INHALATION)
Status: DISPENSED
Start: 2023-02-01

## (undated) RX ORDER — PHENYLEPHRINE HCL IN 0.9% NACL 1 MG/10 ML
SYRINGE (ML) INTRAVENOUS
Status: DISPENSED
Start: 2020-03-11

## (undated) RX ORDER — ONDANSETRON 2 MG/ML
INJECTION INTRAMUSCULAR; INTRAVENOUS
Status: DISPENSED
Start: 2023-06-26

## (undated) RX ORDER — DEXAMETHASONE SODIUM PHOSPHATE 4 MG/ML
INJECTION, SOLUTION INTRA-ARTICULAR; INTRALESIONAL; INTRAMUSCULAR; INTRAVENOUS; SOFT TISSUE
Status: DISPENSED
Start: 2017-10-19

## (undated) RX ORDER — DEXAMETHASONE SODIUM PHOSPHATE 4 MG/ML
INJECTION, SOLUTION INTRA-ARTICULAR; INTRALESIONAL; INTRAMUSCULAR; INTRAVENOUS; SOFT TISSUE
Status: DISPENSED
Start: 2021-11-03

## (undated) RX ORDER — CEFAZOLIN SODIUM/WATER 2 G/20 ML
SYRINGE (ML) INTRAVENOUS
Status: DISPENSED
Start: 2023-02-01

## (undated) RX ORDER — ONDANSETRON 2 MG/ML
INJECTION INTRAMUSCULAR; INTRAVENOUS
Status: DISPENSED
Start: 2021-10-07

## (undated) RX ORDER — ONDANSETRON 2 MG/ML
INJECTION INTRAMUSCULAR; INTRAVENOUS
Status: DISPENSED
Start: 2025-02-27

## (undated) RX ORDER — BUPIVACAINE HYDROCHLORIDE 5 MG/ML
INJECTION, SOLUTION EPIDURAL; INTRACAUDAL
Status: DISPENSED
Start: 2017-10-19

## (undated) RX ORDER — LIDOCAINE HYDROCHLORIDE 10 MG/ML
INJECTION, SOLUTION EPIDURAL; INFILTRATION; INTRACAUDAL; PERINEURAL
Status: DISPENSED
Start: 2018-05-01

## (undated) RX ORDER — PROMETHAZINE HYDROCHLORIDE 25 MG/ML
INJECTION, SOLUTION INTRAMUSCULAR; INTRAVENOUS
Status: DISPENSED
Start: 2021-11-03

## (undated) RX ORDER — SCOLOPAMINE TRANSDERMAL SYSTEM 1 MG/1
PATCH, EXTENDED RELEASE TRANSDERMAL
Status: DISPENSED
Start: 2018-11-26

## (undated) RX ORDER — KETOROLAC TROMETHAMINE 30 MG/ML
INJECTION, SOLUTION INTRAMUSCULAR; INTRAVENOUS
Status: DISPENSED
Start: 2020-10-22

## (undated) RX ORDER — PROPOFOL 10 MG/ML
INJECTION, EMULSION INTRAVENOUS
Status: DISPENSED
Start: 2021-11-03

## (undated) RX ORDER — DIAZEPAM 10 MG/2ML
INJECTION, SOLUTION INTRAMUSCULAR; INTRAVENOUS
Status: DISPENSED
Start: 2020-10-22

## (undated) RX ORDER — FENTANYL CITRATE 50 UG/ML
INJECTION, SOLUTION INTRAMUSCULAR; INTRAVENOUS
Status: DISPENSED
Start: 2023-07-19

## (undated) RX ORDER — ONDANSETRON 2 MG/ML
INJECTION INTRAMUSCULAR; INTRAVENOUS
Status: DISPENSED
Start: 2024-08-22

## (undated) RX ORDER — OXYCODONE HYDROCHLORIDE 5 MG/1
TABLET ORAL
Status: DISPENSED
Start: 2017-10-19

## (undated) RX ORDER — GLYCOPYRROLATE 0.2 MG/ML
INJECTION, SOLUTION INTRAMUSCULAR; INTRAVENOUS
Status: DISPENSED
Start: 2025-02-27

## (undated) RX ORDER — LIDOCAINE HYDROCHLORIDE 10 MG/ML
INJECTION, SOLUTION EPIDURAL; INFILTRATION; INTRACAUDAL; PERINEURAL
Status: DISPENSED
Start: 2025-02-27

## (undated) RX ORDER — GLYCOPYRROLATE 0.2 MG/ML
INJECTION INTRAMUSCULAR; INTRAVENOUS
Status: DISPENSED
Start: 2017-10-19

## (undated) RX ORDER — LIDOCAINE HYDROCHLORIDE 10 MG/ML
INJECTION, SOLUTION EPIDURAL; INFILTRATION; INTRACAUDAL; PERINEURAL
Status: DISPENSED
Start: 2021-10-07

## (undated) RX ORDER — LIDOCAINE HYDROCHLORIDE 10 MG/ML
INJECTION, SOLUTION EPIDURAL; INFILTRATION; INTRACAUDAL; PERINEURAL
Status: DISPENSED
Start: 2020-10-22

## (undated) RX ORDER — FENTANYL CITRATE 50 UG/ML
INJECTION, SOLUTION INTRAMUSCULAR; INTRAVENOUS
Status: DISPENSED
Start: 2021-11-03

## (undated) RX ORDER — KETOROLAC TROMETHAMINE 30 MG/ML
INJECTION, SOLUTION INTRAMUSCULAR; INTRAVENOUS
Status: DISPENSED
Start: 2023-07-19

## (undated) RX ORDER — EPHEDRINE SULFATE 50 MG/ML
INJECTION, SOLUTION INTRAVENOUS
Status: DISPENSED
Start: 2017-10-19

## (undated) RX ORDER — DIPHENHYDRAMINE HYDROCHLORIDE 50 MG/ML
INJECTION INTRAMUSCULAR; INTRAVENOUS
Status: DISPENSED
Start: 2021-11-03

## (undated) RX ORDER — ACETAMINOPHEN 325 MG/1
TABLET ORAL
Status: DISPENSED
Start: 2023-02-01

## (undated) RX ORDER — OXYCODONE HYDROCHLORIDE 5 MG/1
TABLET ORAL
Status: DISPENSED
Start: 2023-02-01

## (undated) RX ORDER — ACETAMINOPHEN 10 MG/ML
INJECTION, SOLUTION INTRAVENOUS
Status: DISPENSED
Start: 2017-10-19

## (undated) RX ORDER — HYDROXYZINE HYDROCHLORIDE 25 MG/1
TABLET, FILM COATED ORAL
Status: DISPENSED
Start: 2018-05-01

## (undated) RX ORDER — MECLIZINE HYDROCHLORIDE 25 MG/1
TABLET ORAL
Status: DISPENSED
Start: 2025-02-27

## (undated) RX ORDER — PROPOFOL 10 MG/ML
INJECTION, EMULSION INTRAVENOUS
Status: DISPENSED
Start: 2024-08-22